# Patient Record
Sex: FEMALE | Race: WHITE | NOT HISPANIC OR LATINO | URBAN - METROPOLITAN AREA
[De-identification: names, ages, dates, MRNs, and addresses within clinical notes are randomized per-mention and may not be internally consistent; named-entity substitution may affect disease eponyms.]

---

## 2017-09-11 ENCOUNTER — OUTPATIENT (OUTPATIENT)
Dept: OUTPATIENT SERVICES | Facility: HOSPITAL | Age: 57
LOS: 1 days | End: 2017-09-11
Payer: COMMERCIAL

## 2017-09-11 ENCOUNTER — APPOINTMENT (OUTPATIENT)
Dept: CT IMAGING | Facility: IMAGING CENTER | Age: 57
End: 2017-09-11
Payer: COMMERCIAL

## 2017-09-11 DIAGNOSIS — Z00.8 ENCOUNTER FOR OTHER GENERAL EXAMINATION: ICD-10-CM

## 2017-09-11 DIAGNOSIS — Z98.89 OTHER SPECIFIED POSTPROCEDURAL STATES: Chronic | ICD-10-CM

## 2017-09-11 PROCEDURE — 74177 CT ABD & PELVIS W/CONTRAST: CPT

## 2017-09-11 PROCEDURE — 82565 ASSAY OF CREATININE: CPT

## 2017-09-11 PROCEDURE — 74177 CT ABD & PELVIS W/CONTRAST: CPT | Mod: 26

## 2017-10-18 ENCOUNTER — INPATIENT (INPATIENT)
Facility: HOSPITAL | Age: 57
LOS: 6 days | Discharge: HOME | DRG: 189 | End: 2017-10-25
Attending: HOSPITALIST | Admitting: HOSPITALIST
Payer: COMMERCIAL

## 2017-10-18 VITALS
RESPIRATION RATE: 24 BRPM | HEART RATE: 90 BPM | OXYGEN SATURATION: 90 % | DIASTOLIC BLOOD PRESSURE: 118 MMHG | SYSTOLIC BLOOD PRESSURE: 187 MMHG

## 2017-10-18 DIAGNOSIS — Z29.9 ENCOUNTER FOR PROPHYLACTIC MEASURES, UNSPECIFIED: ICD-10-CM

## 2017-10-18 DIAGNOSIS — I26.99 OTHER PULMONARY EMBOLISM WITHOUT ACUTE COR PULMONALE: ICD-10-CM

## 2017-10-18 DIAGNOSIS — I10 ESSENTIAL (PRIMARY) HYPERTENSION: ICD-10-CM

## 2017-10-18 DIAGNOSIS — Z98.89 OTHER SPECIFIED POSTPROCEDURAL STATES: Chronic | ICD-10-CM

## 2017-10-18 DIAGNOSIS — J96.21 ACUTE AND CHRONIC RESPIRATORY FAILURE WITH HYPOXIA: ICD-10-CM

## 2017-10-18 DIAGNOSIS — J45.909 UNSPECIFIED ASTHMA, UNCOMPLICATED: ICD-10-CM

## 2017-10-18 LAB
ANION GAP SERPL CALC-SCNC: 10 MMOL/L — SIGNIFICANT CHANGE UP (ref 5–17)
APTT BLD: 29.2 SEC — SIGNIFICANT CHANGE UP (ref 27.5–37.4)
BASOPHILS # BLD AUTO: 0 K/UL — SIGNIFICANT CHANGE UP (ref 0–0.2)
BASOPHILS NFR BLD AUTO: 0.3 % — SIGNIFICANT CHANGE UP (ref 0–2)
BUN SERPL-MCNC: 13 MG/DL — SIGNIFICANT CHANGE UP (ref 7–23)
CALCIUM SERPL-MCNC: 8.7 MG/DL — SIGNIFICANT CHANGE UP (ref 8.4–10.5)
CHLORIDE SERPL-SCNC: 98 MMOL/L — SIGNIFICANT CHANGE UP (ref 96–108)
CO2 SERPL-SCNC: 38 MMOL/L — HIGH (ref 22–31)
CREAT SERPL-MCNC: 0.84 MG/DL — SIGNIFICANT CHANGE UP (ref 0.5–1.3)
EOSINOPHIL # BLD AUTO: 0.1 K/UL — SIGNIFICANT CHANGE UP (ref 0–0.5)
EOSINOPHIL NFR BLD AUTO: 0.9 % — SIGNIFICANT CHANGE UP (ref 0–6)
GAS PNL BLDA: SIGNIFICANT CHANGE UP
GAS PNL BLDV: SIGNIFICANT CHANGE UP
GAS PNL BLDV: SIGNIFICANT CHANGE UP
GLUCOSE SERPL-MCNC: 140 MG/DL — HIGH (ref 70–99)
HCT VFR BLD CALC: 49.9 % — HIGH (ref 34.5–45)
HGB BLD-MCNC: 15.3 G/DL — SIGNIFICANT CHANGE UP (ref 11.5–15.5)
INR BLD: 1.13 RATIO — SIGNIFICANT CHANGE UP (ref 0.88–1.16)
LYMPHOCYTES # BLD AUTO: 1.8 K/UL — SIGNIFICANT CHANGE UP (ref 1–3.3)
LYMPHOCYTES # BLD AUTO: 13.9 % — SIGNIFICANT CHANGE UP (ref 13–44)
MCHC RBC-ENTMCNC: 24.7 PG — LOW (ref 27–34)
MCHC RBC-ENTMCNC: 30.6 GM/DL — LOW (ref 32–36)
MCV RBC AUTO: 80.5 FL — SIGNIFICANT CHANGE UP (ref 80–100)
MONOCYTES # BLD AUTO: 0.6 K/UL — SIGNIFICANT CHANGE UP (ref 0–0.9)
MONOCYTES NFR BLD AUTO: 4.8 % — SIGNIFICANT CHANGE UP (ref 2–14)
NEUTROPHILS # BLD AUTO: 10.3 K/UL — HIGH (ref 1.8–7.4)
NEUTROPHILS NFR BLD AUTO: 80 % — HIGH (ref 43–77)
PLATELET # BLD AUTO: 253 K/UL — SIGNIFICANT CHANGE UP (ref 150–400)
POTASSIUM SERPL-MCNC: 3.8 MMOL/L — SIGNIFICANT CHANGE UP (ref 3.5–5.3)
POTASSIUM SERPL-SCNC: 3.8 MMOL/L — SIGNIFICANT CHANGE UP (ref 3.5–5.3)
PROTHROM AB SERPL-ACNC: 12.4 SEC — SIGNIFICANT CHANGE UP (ref 9.8–12.7)
RBC # BLD: 6.2 M/UL — HIGH (ref 3.8–5.2)
RBC # FLD: 19.3 % — HIGH (ref 10.3–14.5)
SODIUM SERPL-SCNC: 146 MMOL/L — HIGH (ref 135–145)
TROPONIN T SERPL-MCNC: <0.01 NG/ML — SIGNIFICANT CHANGE UP (ref 0–0.06)
WBC # BLD: 12.9 K/UL — HIGH (ref 3.8–10.5)
WBC # FLD AUTO: 12.9 K/UL — HIGH (ref 3.8–10.5)

## 2017-10-18 PROCEDURE — 99223 1ST HOSP IP/OBS HIGH 75: CPT | Mod: GC

## 2017-10-18 PROCEDURE — 71010: CPT | Mod: 26

## 2017-10-18 PROCEDURE — 71275 CT ANGIOGRAPHY CHEST: CPT | Mod: 26

## 2017-10-18 PROCEDURE — 99497 ADVNCD CARE PLAN 30 MIN: CPT | Mod: 25

## 2017-10-18 PROCEDURE — 99291 CRITICAL CARE FIRST HOUR: CPT

## 2017-10-18 RX ORDER — IPRATROPIUM/ALBUTEROL SULFATE 18-103MCG
3 AEROSOL WITH ADAPTER (GRAM) INHALATION EVERY 6 HOURS
Qty: 0 | Refills: 0 | Status: DISCONTINUED | OUTPATIENT
Start: 2017-10-18 | End: 2017-10-25

## 2017-10-18 RX ORDER — IPRATROPIUM/ALBUTEROL SULFATE 18-103MCG
3 AEROSOL WITH ADAPTER (GRAM) INHALATION ONCE
Qty: 0 | Refills: 0 | Status: COMPLETED | OUTPATIENT
Start: 2017-10-18 | End: 2017-10-18

## 2017-10-18 RX ORDER — HEPARIN SODIUM 5000 [USP'U]/ML
5000 INJECTION INTRAVENOUS; SUBCUTANEOUS EVERY 6 HOURS
Qty: 0 | Refills: 0 | Status: DISCONTINUED | OUTPATIENT
Start: 2017-10-18 | End: 2017-10-19

## 2017-10-18 RX ORDER — IPRATROPIUM/ALBUTEROL SULFATE 18-103MCG
3 AEROSOL WITH ADAPTER (GRAM) INHALATION ONCE
Qty: 0 | Refills: 0 | Status: COMPLETED | OUTPATIENT
Start: 2017-10-18 | End: 2017-10-22

## 2017-10-18 RX ORDER — HEPARIN SODIUM 5000 [USP'U]/ML
10000 INJECTION INTRAVENOUS; SUBCUTANEOUS EVERY 6 HOURS
Qty: 0 | Refills: 0 | Status: DISCONTINUED | OUTPATIENT
Start: 2017-10-18 | End: 2017-10-19

## 2017-10-18 RX ORDER — FUROSEMIDE 40 MG
40 TABLET ORAL
Qty: 0 | Refills: 0 | Status: DISCONTINUED | OUTPATIENT
Start: 2017-10-19 | End: 2017-10-23

## 2017-10-18 RX ORDER — HEPARIN SODIUM 5000 [USP'U]/ML
10000 INJECTION INTRAVENOUS; SUBCUTANEOUS ONCE
Qty: 0 | Refills: 0 | Status: COMPLETED | OUTPATIENT
Start: 2017-10-18 | End: 2017-10-18

## 2017-10-18 RX ORDER — HEPARIN SODIUM 5000 [USP'U]/ML
INJECTION INTRAVENOUS; SUBCUTANEOUS
Qty: 25000 | Refills: 0 | Status: DISCONTINUED | OUTPATIENT
Start: 2017-10-18 | End: 2017-10-19

## 2017-10-18 RX ORDER — FUROSEMIDE 40 MG
120 TABLET ORAL ONCE
Qty: 0 | Refills: 0 | Status: COMPLETED | OUTPATIENT
Start: 2017-10-18 | End: 2017-10-18

## 2017-10-18 RX ADMIN — Medication 3 MILLILITER(S): at 17:40

## 2017-10-18 RX ADMIN — Medication 124 MILLIGRAM(S): at 19:16

## 2017-10-18 RX ADMIN — HEPARIN SODIUM 2200 UNIT(S)/HR: 5000 INJECTION INTRAVENOUS; SUBCUTANEOUS at 20:21

## 2017-10-18 RX ADMIN — HEPARIN SODIUM 10000 UNIT(S): 5000 INJECTION INTRAVENOUS; SUBCUTANEOUS at 19:47

## 2017-10-18 RX ADMIN — Medication 3 MILLILITER(S): at 17:39

## 2017-10-18 RX ADMIN — Medication 3 MILLILITER(S): at 18:23

## 2017-10-18 RX ADMIN — Medication 3 MILLILITER(S): at 17:17

## 2017-10-18 RX ADMIN — Medication 3 MILLILITER(S): at 17:16

## 2017-10-18 RX ADMIN — Medication 3 MILLILITER(S): at 23:07

## 2017-10-18 NOTE — H&P ADULT - PROBLEM SELECTOR PLAN 4
-Does not appear to be in exacerbation clinically, however consider empiric steroids if pt continues to decompensate. DuoNebs q6h, f/u MICU recs. -Possibility there is a component of asthma exacerbation contributing to her symptoms as her dyspnea is likely multifactorial, start empiric prednisone 40 x 5 days.  Start Levaquin x 5 days and follow above infectious workup; if pt appears better can likely d/c abx if clinically stabilizs.  DuoNebs atc q6h, f/u MICU recs.

## 2017-10-18 NOTE — H&P ADULT - PROBLEM SELECTOR PLAN 2
-c/w hep gtt  -BP stable although pt w/ increased O2 requirements. MICU consulted. -c/w hep gtt as above  -BP stable although pt w/ increased O2 requirements. 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 doac.  -send infectious workup though at this time infection does not seem to be primary driving force behind multifactorial hypoxic/hypercarbic resp failure: check rvp, blood cultures, sputum cultures, legionella ag.

## 2017-10-18 NOTE — ED PROVIDER NOTE - CRITICAL CARE PROVIDED
direct patient care (not related to procedure)/documentation/additional history taking/interpretation of diagnostic studies

## 2017-10-18 NOTE — CONSULT NOTE ADULT - ATTENDING COMMENTS
This  is a 57 Y.O. female with pmh of AARON on CPAP  at home with O2 supplementation at night, DVT 5 years ago s/p a/c, asthma no outpatient PFT's, T2dm, who presents with sob, cough and found to be hypoxic at PMD's office.  In the ED was found to have acute on chronic hypoxemic/ hypercarbic RF  with CTA chest  showed small subseg RML PE and pulmonary edema without pna. US exam at bedside showed scattered B lined R>L.  Patient now with -  1. Acute on chronic hypoxemic/ hypercarbic respiratory failure - multimodal- acute asthma exacerbation, pulmonary edema coupled with subsegmental PE on based line AARON and chronic O2 use- steroids, duonebs, optimize fluid balance , TTE, Pulmonary team am, aggressive pulmonary toilet.   2. PE- subsegmental - but given patient habitus and minimal mobility need LE duplex and possible LT a/c weighing appropriate risk, benefits   3. morbid Obesity- Nutrition and dietary c/s   Care and treatment as detailed above unless otherwise stated. No need for MICU at this juncture. Please feel free to re-consult if any change in patient's clinical status. We will continue to follow. Case discussed extensively with patient, staff, team and specialist on board.

## 2017-10-18 NOTE — CONSULT NOTE ADULT - SUBJECTIVE AND OBJECTIVE BOX
CHIEF COMPLAINT: SOB, hypoxemia    HPI:   The patient is a 57 Y.O. female with pmh of AARON on CPAP at home, no know settings, DVT 5 years ago s/p a/c, asthma no outpatient PFT's, T2dm, who presents with sob, cough and found to be hypoxic at PMD's office.     The patient has been having increased sob, and cough progressively over the last week. No fevers, no chills, travel or sick contacts. Noticed that she is having harder time breathing and with ambulation. Clear sputum, no chronic cough, sob, or wheezing. Found her self to be very tired and harder time lying down flat. The patient went to PMD was found to be hypoxic to 60"s and reported to the ED. She is non-complaint with meds and only occasional use of cpap. Had stress 6 years ago, doesn't remember an echo. LE is chronic on and off on lasix. Not worse than in the past. No CARDs or outpatient PFT's.     In the ED was found to have hypercarbic RF, never intubated in the past for asthma, never on steroids. Started on lasix,nebs and bipap. CTA showed small subseg RML PE and pulmonary edema without pna. US exam at bedside showed scattered B lined R>L. By the time of exam hypoxia largely improved but still with gradient, improving CO2 and patient feeling much better.    PAST MEDICAL & SURGICAL HISTORY:  Pneumomediastinum  Umbilical hernia: Reduciable  Osteoarthritis of both knees, unspecified osteoarthritis type  AARON (Obstructive Sleep Apnea): category II pt uses c/pap pt to bering to hospital/  OR booking notified  Pneumonia:   GERD (Gastroesophageal Reflux Disease)  Asthma: diagnosed   on adbvair denies attacks  DVT (Deep Venous Thrombosis): left leg 6 m s/p knee replacement in   HTN (Hypertension)  H/O ileostomy: Ileostomy Revesal   S/P LEEP:   S/P Exploratory Laparotomy:   peritonitis  s/p right knee replacement/ colon resection and ileostomy  S/P Colonoscopy:   S/P Endoscopy:  gerd  S/P  Section:   History of Tubal Ligation: s/p c/section   S/P Knee Surgery: 2010      FAMILY HISTORY:  Family history of gastric cancer  Family history of breast cancer (Grandparent)      SOCIAL HISTORY:  Smoking: [x ] Never Smoked [ ] Former Smoker (__ packs x ___ years) [ ] Current Smoker  (__ packs x ___ years)  Substance Use: [ x] Never Used [ ] Used ____  EtOH Use: Not a drinker.   Marital Status: [ ] Single [ ]  [ ]  [ ]   Sexual History:   Occupation: Retired/disability. WOrked as .   Recent Travel:  Country of Birth:  Advance Directives:    Allergies    Kiwi (Unknown)  latex (Anaphylaxis)  latex (Unknown)  penicillin (Other)  penicillin (Unknown)  perfume  hives (Other)  potassium acetate (Other)  soap additives hives (Other)    Intolerances        HOME MEDICATIONS:    REVIEW OF SYSTEMS:  Constitutional: [x ] negative [ ] fevers [ ] chills [ ] weight loss [ ] weight gain  HEENT: [ x] negative [ ] dry eyes [ ] eye irritation [ ] postnasal drip [ ] nasal congestion  CV: [ ] negative  [ ] chest pain [x ] orthopnea [ ] palpitations [ ] murmur  Resp: [ ] negative [x ] cough [x ] shortness of breath [x ] dyspnea [ ] wheezing [ ] sputum [ ] hemoptysis  GI: [ x] negative [ ] nausea [ ] vomiting [ ] diarrhea [ ] constipation [ ] abd pain [ ] dysphagia   : [x ] negative [ ] dysuria [ ] nocturia [ ] hematuria [ ] increased urinary frequency  Musculoskeletal: [ x] negative [ ] back pain [ ] myalgias [ ] arthralgias [ ] fracture  Skin: [ x] negative [ ] rash [ ] itch  Neurological: [ x] negative [ ] headache [ ] dizziness [ ] syncope [ ] weakness [ ] numbness  Psychiatric: [x ] negative [ ] anxiety [ ] depression  Endocrine: [ ] negative [ ] diabetes [ ] thyroid problem  Hematologic/Lymphatic: [ ] negative [ ] anemia [ ] bleeding problem  Allergic/Immunologic: [ ] negative [ ] itchy eyes [ ] nasal discharge [ ] hives [ ] angioedema  [ ] All other systems negative  [ ] Unable to assess ROS because ________    OBJECTIVE:  ICU Vital Signs Last 24 Hrs  T(C): 36.9 (18 Oct 2017 16:50), Max: 36.9 (18 Oct 2017 16:50)  T(F): 98.5 (18 Oct 2017 16:50), Max: 98.5 (18 Oct 2017 16:50)  HR: 110 (18 Oct 2017 22:56) (90 - 112)  BP: 127/67 (18 Oct 2017 22:51) (110/91 - 187/118)  BP(mean): --  ABP: --  ABP(mean): --  RR: 22 (18 Oct 2017 22:56) (18 - 27)  SpO2: 100% (18 Oct 2017 22:56) (90% - 100%)        CAPILLARY BLOOD GLUCOSE    PHYSICAL EXAM:    Constitutional: well-developed; well-groomed; well-nourished; no distress, Obese, thick neck.   Eyes: PERRL; EOMI  ENMT: Bipap in place.   Neck:  Supple; no JVD; normal thyroid gland  MSK/Back: normal shape; ROM intact; strength intact, no CVA tenderness. Normal strength in all ext, No joint swelling, no joint tenderenss, no joint erythema, no effusions  Respiratory: No wheezing, crackled, limited exam 2/2 to habitus.   Cardiovascular: regular rate and rhythm  no rub , no murmur, no gallops.   Gastrointestinal: soft; no distention, normal BS, no TTP, no rebound, no guarding, no mass, no organomegaly, no ascites. reducible hernia.   Genitourinary:  Extremities: no clubbing; no cyanosis; +1 pedal edema to mid shins, non-tender to palpation, DP and Radial pulses intact.  Neurological: alert and oriented x 3; responds to pain; responds to verbal commands; sensation intact: CN nerves grossly intact.   Skin: warm and dry; color normal: no rash: no ulcers  Psychiatric: Calm, no SI/HI        LINES:     HOSPITAL MEDICATIONS:  heparin  Infusion.  Unit(s)/Hr IV Continuous <Continuous>          ALBUTerol/ipratropium for Nebulization 3 milliLiter(s) Nebulizer every 6 hours  ALBUTerol/ipratropium for Nebulization. 3 milliLiter(s) Nebulizer once  ALBUTerol/ipratropium for Nebulization. 3 milliLiter(s) Nebulizer once        LABS:                        15.3   12.9  )-----------( 253      ( 18 Oct 2017 16:52 )             49.9     Hgb Trend: 15.3<--  10-18    146<H>  |  98  |  13  ----------------------------<  140<H>  3.8   |  38<H>  |  0.84    Ca    8.7      18 Oct 2017 16:52      Creatinine Trend: 0.84<--  PT/INR - ( 18 Oct 2017 16:52 )   PT: 12.4 sec;   INR: 1.13 ratio         PTT - ( 18 Oct 2017 16:52 )  PTT:29.2 sec    Arterial Blood Gas:  10-18 @ 22:22  7.34/85/77/44/94/14.0  ABG lactate: --    Venous Blood Gas:  10-18 @ 18:52  7.25/107/42/45/61  VBG Lactate: 1.2  Venous Blood Gas:  10-18 @ 16:52  7.28/93/46/42/70  VBG Lactate: 1.5      MICROBIOLOGY:     RADIOLOGY:  [x ] Reviewed and interpreted by me    CTA	Subsegmental pulmonary embolism in the right middle lobe, likely with   	superimposed edema.  CXR  Pulmonary edema.     EKG: 	EKG: sinus rhythm, no ST/T wave changes CHIEF COMPLAINT: SOB, hypoxemia    HPI:   The patient is a 57 Y.O. female with pmh of AARON on CPAP  at home, no know settings, DVT 5 years ago s/p a/c, asthma no outpatient PFT's, T2dm, who presents with sob, cough and found to be hypoxic at PMD's office.     The patient has been having increased sob, and cough progressively over the last week. No fevers, no chills, travel or sick contacts. Noticed that she is having harder time breathing and with ambulation. Clear sputum, no chronic cough, sob, or wheezing. Found her self to be very tired and harder time lying down flat. The patient went to PMD was found to be hypoxic to 60"s and reported to the ED. She is non-complaint with meds and only occasional use of cpap. Had stress 6 years ago, doesn't remember an echo. LE is chronic on and off on lasix. Not worse than in the past. No CARDs or outpatient PFT's.     In the ED was found to have hypercarbic RF, never intubated in the past for asthma, never on steroids. Started on lasix, ,nebs and bipap. CTA showed small subseg RML PE and pulmonary edema without pna. US exam at bedside showed scattered B lined R>L. By the time of exam hypoxia largely improved but still with gradient, improving CO2 and patient feeling much better.    PAST MEDICAL & SURGICAL HISTORY:  Pneumomediastinum  Umbilical hernia: Reducible  Osteoarthritis of both knees, unspecified osteoarthritis type  AARON (Obstructive Sleep Apnea): category II pt uses c/pap pt to bring to hospital/  OR booking notified  Pneumonia:   GERD (Gastroesophageal Reflux Disease)  Asthma: diagnosed   on Advair denies attacks  DVT (Deep Venous Thrombosis): left leg 6 m s/p knee replacement in   HTN (Hypertension)  H/O ileostomy: Ileostomy Reversal   S/P LEEP:   S/P Exploratory Laparotomy:   peritonitis  s/p right knee replacement/ colon resection and ileostomy  S/P Colonoscopy:   S/P Endoscopy:  GERD  S/P  Section:   History of Tubal Ligation: s/p c/section   S/P Knee Surgery: 2010      FAMILY HISTORY:  Family history of gastric cancer  Family history of breast cancer (Grandparent)      SOCIAL HISTORY:  Smoking: [x ] Never Smoked [ ] Former Smoker (__ packs x ___ years) [ ] Current Smoker  (__ packs x ___ years)  Substance Use: [ x] Never Used [ ] Used ____  EtOH Use: Not a drinker.   Marital Status: [ ] Single [ ]  [ ]  [ ]   Sexual History:   Occupation: Retired/disability. WOrked as .   Recent Travel:  Country of Birth:  Advance Directives:    Allergies    Kiwi (Unknown)  latex (Anaphylaxis)  latex (Unknown)  penicillin (Other)  penicillin (Unknown)  perfume  hives (Other)  potassium acetate (Other)  soap additives hives (Other)    Intolerances        HOME MEDICATIONS:    REVIEW OF SYSTEMS:  Constitutional: [x ] negative [ ] fevers [ ] chills [ ] weight loss [ ] weight gain  HEENT: [ x] negative [ ] dry eyes [ ] eye irritation [ ] postnasal drip [ ] nasal congestion  CV: [ ] negative  [ ] chest pain [x ] orthopnea [ ] palpitations [ ] murmur  Resp: [ ] negative [x ] cough [x ] shortness of breath [x ] dyspnea [ ] wheezing [ ] sputum [ ] hemoptysis  GI: [ x] negative [ ] nausea [ ] vomiting [ ] diarrhea [ ] constipation [ ] abd pain [ ] dysphagia   : [x ] negative [ ] dysuria [ ] nocturia [ ] hematuria [ ] increased urinary frequency  Musculoskeletal: [ x] negative [ ] back pain [ ] myalgias [ ] arthralgias [ ] fracture  Skin: [ x] negative [ ] rash [ ] itch  Neurological: [ x] negative [ ] headache [ ] dizziness [ ] syncope [ ] weakness [ ] numbness  Psychiatric: [x ] negative [ ] anxiety [ ] depression  Endocrine: [ ] negative [ ] diabetes [ ] thyroid problem  Hematologic/Lymphatic: [ ] negative [ ] anemia [ ] bleeding problem  Allergic/Immunologic: [ ] negative [ ] itchy eyes [ ] nasal discharge [ ] hives [ ] angioedema  [ ] All other systems negative  [ ] Unable to assess ROS because ________    OBJECTIVE:  ICU Vital Signs Last 24 Hrs  T(C): 36.9 (18 Oct 2017 16:50), Max: 36.9 (18 Oct 2017 16:50)  T(F): 98.5 (18 Oct 2017 16:50), Max: 98.5 (18 Oct 2017 16:50)  HR: 110 (18 Oct 2017 22:56) (90 - 112)  BP: 127/67 (18 Oct 2017 22:51) (110/91 - 187/118)  BP(mean): --  ABP: --  ABP(mean): --  RR: 22 (18 Oct 2017 22:56) (18 - 27)  SpO2: 100% (18 Oct 2017 22:56) (90% - 100%)        CAPILLARY BLOOD GLUCOSE    PHYSICAL EXAM:    Constitutional: well-developed; well-groomed; well-nourished; no distress, Obese, thick neck.   Eyes: PERRL; EOMI  ENMT: Bipap in place.   Neck:  Supple; no JVD; normal thyroid gland  MSK/Back: normal shape; ROM intact; strength intact, no CVA tenderness. Normal strength in all ext, No joint swelling, no joint tenderenss, no joint erythema, no effusions  Respiratory: No wheezing, crackled, limited exam 2/2 to habitus.   Cardiovascular: regular rate and rhythm  no rub , no murmur, no gallops.   Gastrointestinal: soft; no distention, normal BS, no TTP, no rebound, no guarding, no mass, no organomegaly, no ascites. reducible hernia.   Genitourinary:  Extremities: no clubbing; no cyanosis; +1 pedal edema to mid shins, non-tender to palpation, DP and Radial pulses intact.  Neurological: alert and oriented x 3; responds to pain; responds to verbal commands; sensation intact: CN nerves grossly intact.   Skin: warm and dry; color normal: no rash: no ulcers  Psychiatric: Calm, no SI/HI        LINES:     HOSPITAL MEDICATIONS:  heparin  Infusion.  Unit(s)/Hr IV Continuous <Continuous>          ALBUTerol/ipratropium for Nebulization 3 milliLiter(s) Nebulizer every 6 hours  ALBUTerol/ipratropium for Nebulization. 3 milliLiter(s) Nebulizer once  ALBUTerol/ipratropium for Nebulization. 3 milliLiter(s) Nebulizer once        LABS:                        15.3   12.9  )-----------( 253      ( 18 Oct 2017 16:52 )             49.9     Hgb Trend: 15.3<--  10-18    146<H>  |  98  |  13  ----------------------------<  140<H>  3.8   |  38<H>  |  0.84    Ca    8.7      18 Oct 2017 16:52      Creatinine Trend: 0.84<--  PT/INR - ( 18 Oct 2017 16:52 )   PT: 12.4 sec;   INR: 1.13 ratio         PTT - ( 18 Oct 2017 16:52 )  PTT:29.2 sec    Arterial Blood Gas:  10-18 @ 22:22  7.34/85/77/44/94/14.0  ABG lactate: --    Venous Blood Gas:  10-18 @ 18:52  7.25/107/42/45/61  VBG Lactate: 1.2  Venous Blood Gas:  10-18 @ 16:52  7.28/93/46/42/70  VBG Lactate: 1.5      MICROBIOLOGY:     RADIOLOGY:  [x ] Reviewed and interpreted by me    CTA	Subsegmental pulmonary embolism in the right middle lobe, likely with   	superimposed edema.  CXR  Pulmonary edema.     EKG: 	EKG: sinus rhythm, no ST/T wave changes

## 2017-10-18 NOTE — ED PROVIDER NOTE - PSH
H/O ileostomy  Ileostomy Revesal   History of Tubal Ligation  s/p c/section   S/P  Section    S/P Colonoscopy    S/P Endoscopy   gerd  S/P Exploratory Laparotomy    peritonitis  s/p right knee replacement/ colon resection and ileostomy  S/P Knee Surgery  2010  S/P LEEP

## 2017-10-18 NOTE — H&P ADULT - ASSESSMENT
57 F w/ asthma and HTN a/w hypercapnic and hypoxic resp failure found to have new PE and pulmonary edema. 57 F w/ asthma and HTN a/w hypercapnic and hypoxic resp failure found to have new PE and pulmonary edema. Suspect hypoxia 2/2 to PE and pulmonary edema from ?new onset heart failure and hypercarbia from non-compliance with CPAP at home vs. asthma exacerbation. 57 F w/ asthma and HTN a/w hypercapnic and hypoxic resp failure found to have new PE and pulmonary edema. Suspect hypoxia 2/2 to PE and pulmonary edema from ?undiagnosed heart failure and hypercarbia from non-compliance with CPAP at home vs. asthma exacerbation.

## 2017-10-18 NOTE — ED PROVIDER NOTE - OBJECTIVE STATEMENT
57 year old female presenting with hypoxia and sob. 60s % on ra at md office, travel by EMS to here high 80s. Pt has hx of dvt, recently started on lasix for le edema, pt also recently traveled. NO cp or syncope. No pleurisy. Mild cough. hx asthma as well. does not feel like asthma attack.

## 2017-10-18 NOTE — H&P ADULT - NSHPSOCIALHISTORY_GEN_ALL_CORE
No smoking, alcohol, or IVDU. Lives at home with  and children No smoking, alcohol, or IVDU. Lives at home with  and children.  retired  owner.

## 2017-10-18 NOTE — ED ADULT NURSE NOTE - OBJECTIVE STATEMENT
56 yo female presents to the ED from home c/o SOB x2days. patient states she has been having cough x3 weeks, dry cough until last night she "had a coughing attack" with thick white sputum and SOB. she states the SOB has increased today with the productive cough which brought her to the ED. patient is AAOx3. lung sounds clear but diminished bilaterally. cap refill <3sec. +3 pitting edema to legs bilaterally with dorsal pulses present bilaterally. skin is warm, dry and intact. normal skin tone. patient denies fevers, chills, N/V/D, HA, dizziness, chest pain. productive cough present, thick white sputum. patient 90%= O2 RA. patient placed on nonrebreather with O2= 93%. HR= 102-106. MD at the bedside.

## 2017-10-18 NOTE — ED ADULT NURSE REASSESSMENT NOTE - NS ED NURSE REASSESS COMMENT FT1
1730- patient O2= 88% on non-breather. Respiratory called. patient placed on BiPAP with settings of IPAP= 14, EPAP=7, O2= 75%, RR= 12, VTV= 360. patient tolerated well. O2= 95%.

## 2017-10-18 NOTE — ED PROVIDER NOTE - CONSTITUTIONAL, MLM
normal... Well appearing, well nourished, awake, alert, oriented to person, place, time/situation and in mild resp distress.

## 2017-10-18 NOTE — ED PROVIDER NOTE - NS ED ROS FT
ROS: As noted in HPI, otherwise below --  Constitutional symptoms: Negative  Eyes: Negative  Ears, Nose, Mouth, Throat: Negative  Cardiovascular: Negative  Respiratory: +  Gastrointestinal: Negative  Genitourinary: Negative  Musculoskeletal: Negative  Integumentary: Negative  Neurological: Negative  Psychiatric: Negative  Endocrine: Negative  Allergic/Immunologic: Negative

## 2017-10-18 NOTE — H&P ADULT - NSHPPHYSICALEXAM_GEN_ALL_CORE
GENERAL APPEARANCE: Well developed, well nourished, alert and cooperative on BiPAP. NAD.   HEENT:  NC/AT, clear conjunctiva  NECK: Neck supple, non-tender without lymphadenopathy, masses  CARDIAC: Normal S1 and S2. Tachy  LUNGS: Decreased breath sounds bilaterally, no wheezing appreciated  ABDOMEN: Soft, nondistended, nontender. No guarding or rebound. +reducible ventral hernia   MUSKULOSKELETAL: No joint erythema or tenderness.   EXTREMITIES: 2-3+ b/l pitting LE edema, L worse than R  NEUROLOGICAL: No focal neurologic deficit. Strength and sensation symmetric and intact throughout.   SKIN: Skin clean, dry, intact  PSYCHIATRIC: AOx3.Normal affect and behavior. GENERAL APPEARANCE: Well developed, well nourished, alert and cooperative on BiPAP. NAD.   HEAD: NC/AT  EYES: clear conjunctiva, midline  NECK: Neck supple, non-tender without lymphadenopathy, masses  HEART: Normal S1 and S2. Tachy  LUNGS: Decreased breath sounds bilaterally, no wheezing appreciated but poor aeration overall  ABDOMEN: Soft, nondistended, nontender. No guarding or rebound. +reducible ventral hernia   MUSKULOSKELETAL: No joint erythema or tenderness.   EXTREMITIES: 2-3+ b/l pitting LE edema, L worse than R  NEUROLOGICAL: No focal neurologic deficit. Strength and sensation symmetric and intact throughout.   SKIN: Skin clean, dry, intact  PSYCHIATRIC: AOx3.Normal affect and behavior.

## 2017-10-18 NOTE — CONSULT NOTE ADULT - NSHPATTENDINGPLANDISCUSS_GEN_ALL_CORE
We will continue to follow. Case discussed extensively with patient, staff, team and specialist on board.

## 2017-10-18 NOTE — ED ADULT NURSE REASSESSMENT NOTE - NS ED NURSE REASSESS COMMENT FT1
As per conversation with MD Lam, requested MICU consult for pt due to tachycardia and low O2 saturation in the mid 80s on BiPAP and tachypnea.  Will continue to monitor.

## 2017-10-18 NOTE — ED ADULT NURSE REASSESSMENT NOTE - NS ED NURSE REASSESS COMMENT FT1
Comfort care measures provided.  Safety maintained, patient still tolerating BIPAP.  Patient is pending bed assignment upstairs.  Family at the bedside, patient and family aware of plan of care.

## 2017-10-18 NOTE — ED PROVIDER NOTE - MEDICAL DECISION MAKING DETAILS
ddx pe, pna, ptx, chf, asthma . unlikely acs given symptoms. xr chest, cta, labs, ekg, will not give emperic ac yet but if pt decompensates before imaging will do so.

## 2017-10-18 NOTE — ED PROVIDER NOTE - PMH
Anemia  borderline anemia  Asthma  diagnosed 2010  on adbvair denies attacks  Cervical Dysplasia  s/p leep 2002  DVT (Deep Venous Thrombosis)  left leg 6 m s/p knee replacement in 2010  GERD (Gastroesophageal Reflux Disease)    HTN (Hypertension)    AARON (Obstructive Sleep Apnea)  category II pt uses c/pap pt to bering to hospital/  OR booking notified  Osteoarthritis of both knees, unspecified osteoarthritis type    Pneumonia  9-2010  Umbilical hernia  Reduciable

## 2017-10-18 NOTE — H&P ADULT - NSHPREVIEWOFSYSTEMS_GEN_ALL_CORE
CONSTITUTIONAL:  No weight loss, fever, chills. +fatigue  HEENT:  Eyes:  No visual loss, blurred vision, double vision or yellow sclerae. Ears, Nose, Throat:  No hearing loss, sneezing, congestion, runny nose or sore throat.  SKIN:  No rash or itching.  CARDIOVASCULAR:  No chest pain, chest pressure or chest discomfort. No palpitations or edema.  RESPIRATORY:  +SOB, productive cough  GASTROINTESTINAL:  No nausea, vomiting or diarrhea. No abdominal pain.   GENITOURINARY:  Denies hematuria, dysuria.   NEUROLOGICAL:  No headache, dizziness, syncope.   MUSCULOSKELETAL:  No muscle, back pain, joint pain or stiffness.  HEMATOLOGIC:  No anemia, bleeding or bruising. CONSTITUTIONAL:  No weight loss, fever, chills. +fatigue  HEENT:  Eyes:  No visual loss, blurred vision, double vision or yellow sclerae. Ears, Nose, Throat:  No hearing loss, sneezing, congestion, runny nose or sore throat.  SKIN:  No rash or itching.  CARDIOVASCULAR:  No chest pain, chest pressure or chest discomfort. No palpitations or edema.  RESPIRATORY:  +SOB, productive cough  GASTROINTESTINAL:  No nausea, vomiting or diarrhea. No abdominal pain.   GENITOURINARY:  Denies hematuria, dysuria.   NEUROLOGICAL:  No headache, dizziness, syncope.   MUSCULOSKELETAL:  No muscle, back pain, joint pain or stiffness.  HEMATOLOGIC:  No anemia, bleeding or bruising.  ALLERGIC: no hives, no itching

## 2017-10-18 NOTE — ED PROVIDER NOTE - FAMILY HISTORY
Family history of gastric cancer     Grandparent  Still living? Unknown  Family history of breast cancer, Age at diagnosis: Age Unknown

## 2017-10-18 NOTE — H&P ADULT - HISTORY OF PRESENT ILLNESS
57 F w/ asthma and HTN p/w 2 weeks of worsening SOB and cough. Pt presented to PCP today and was found to be hypoxic to the 70s and transferred to ED for further management. Pt reports that she recently went on a trip and just came back last week. She has a hx of DVT on her L leg that she was not on anticoagulation. Her symptoms have been ongoing for the past 2 weeks and got so bad today that prompted her to seek medical attention. She endorses cough productive of clear sputum, denies any blood. Also endorsed generalized fatigue and weakness. Denies chest pain, fever, sweats, chills, or rhinorrhea. No nausea, vomiting, or abd pain. No sick contacts. While in the ED, she was found to have a new RML subsegmental PE and pulmonary edema. Pt given 120mg IV lasix, hep gtt, and placed on BiPAP. Admitted to medicine for further management.     Vital Signs Last 24 Hrs  T(C): 36.9 (18 Oct 2017 16:50), Max: 36.9 (18 Oct 2017 16:50)  T(F): 98.5 (18 Oct 2017 16:50), Max: 98.5 (18 Oct 2017 16:50)  HR: 110 (18 Oct 2017 22:56) (90 - 112)  BP: 127/67 (18 Oct 2017 22:51) (110/91 - 187/118)  RR: 22 (18 Oct 2017 22:56) (18 - 27)  SpO2: 100% (18 Oct 2017 22:56) (90% - 100%) 57 F w/ asthma, AARON, and HTN p/w 2 weeks of worsening SOB and cough. Pt presented to PCP today and was found to be hypoxic to the 70s and transferred to ED for further management. Pt reports that she recently went on a trip and just came back last week. She has a hx of DVT on her L leg that she was not on anticoagulation. Her symptoms have been ongoing for the past 2 weeks and got so bad today that prompted her to seek medical attention. She endorses cough productive of clear sputum, denies any blood. Also endorsed generalized fatigue and weakness. Denies chest pain, fever, sweats, chills, or rhinorrhea. No nausea, vomiting, or abd pain. No sick contacts. Of note, pt is on CPAP at home and reports not using it for the past few nights as she lost the machine; she is not sure of how much CPAP she uses normally. While in the ED, she was found to have a new RML subsegmental PE and pulmonary edema. Pt given 120mg IV lasix, hep gtt, and placed on BiPAP. Admitted to medicine for further management.     Vital Signs Last 24 Hrs  T(C): 36.9 (18 Oct 2017 16:50), Max: 36.9 (18 Oct 2017 16:50)  T(F): 98.5 (18 Oct 2017 16:50), Max: 98.5 (18 Oct 2017 16:50)  HR: 110 (18 Oct 2017 22:56) (90 - 112)  BP: 127/67 (18 Oct 2017 22:51) (110/91 - 187/118)  RR: 22 (18 Oct 2017 22:56) (18 - 27)  SpO2: 100% (18 Oct 2017 22:56) (90% - 100%) 57 F w/ asthma, AARON, and HTN p/w 2 weeks of worsening SOB and cough. Pt presented to PCP today and was found to be hypoxic to the 70s and transferred to ED for further management. Pt reports that she recently went on a trip and just came back last week. She has a hx of DVT on her L leg 1-2 years ago that she was on coumadin x 6 months. Her symptoms have been ongoing for the past 2 weeks and got so bad today that prompted her to seek medical attention. She endorses cough productive of clear sputum, denies any blood. Also endorsed generalized fatigue and weakness. Denies chest pain, fever, sweats, chills, or rhinorrhea. No nausea, vomiting, or abd pain. No sick contacts. Of note, pt is on CPAP at home and reports not using it for the past few nights as she lost the machine; she is not sure of how much CPAP she uses normally. While in the ED, she was found to have a new RML subsegmental PE and pulmonary edema. Pt given 120mg IV lasix, hep gtt, and placed on BiPAP. Admitted to medicine for further management.     Vital Signs Last 24 Hrs  T(C): 36.9 (18 Oct 2017 16:50), Max: 36.9 (18 Oct 2017 16:50)  T(F): 98.5 (18 Oct 2017 16:50), Max: 98.5 (18 Oct 2017 16:50)  HR: 110 (18 Oct 2017 22:56) (90 - 112)  BP: 127/67 (18 Oct 2017 22:51) (110/91 - 187/118)  RR: 22 (18 Oct 2017 22:56) (18 - 27)  SpO2: 100% (18 Oct 2017 22:56) (90% - 100%)

## 2017-10-18 NOTE — ED PROVIDER NOTE - ATTENDING CONTRIBUTION TO CARE
Dr. EMANUEL Lam:  I have independently evaluated the patient and have documented in the appropriate sections above.  I agree with the exam and plan as noted above.

## 2017-10-18 NOTE — H&P ADULT - NSHPLABSRESULTS_GEN_ALL_CORE
15.3   12.9  )-----------( 253      ( 18 Oct 2017 16:52 )             49.9     10-18    146<H>  |  98  |  13  ----------------------------<  140<H>  3.8   |  38<H>  |  0.84    Ca    8.7      18 Oct 2017 16:52      CARDIAC MARKERS ( 18 Oct 2017 16:52 )  x     / <0.01 ng/mL / x     / x     / x          ABG - ( 18 Oct 2017 22:22 )  pH: 7.34  /  pCO2: 85    /  pO2: 77    / HCO3: 44    / Base Excess: 14.0  /  SaO2: 94                    PT/INR - ( 18 Oct 2017 16:52 )   PT: 12.4 sec;   INR: 1.13 ratio         PTT - ( 18 Oct 2017 16:52 )  PTT:29.2 sec    EKG: sinus rhythm, no ST/T wave changes    CTA:  < from: CT Angio Chest w/ IV Cont (10.18.17 @ 19:48) >    Subsegmental pulmonary embolism in the right middle lobe, likely with   superimposed edema.    < end of copied text > Labs: personally reviewed     15.3   12.9  )-----------( 253      ( 18 Oct 2017 16:52 )             49.9     10-18    146<H>  |  98  |  13  ----------------------------<  140<H>  3.8   |  38<H>  |  0.84    Ca    8.7      18 Oct 2017 16:52      CARDIAC MARKERS ( 18 Oct 2017 16:52 )  x     / <0.01 ng/mL / x     / x     / x          ABG - ( 18 Oct 2017 22:22 )  pH: 7.34  /  pCO2: 85    /  pO2: 77    / HCO3: 44    / Base Excess: 14.0  /  SaO2: 94                    PT/INR - ( 18 Oct 2017 16:52 )   PT: 12.4 sec;   INR: 1.13 ratio         PTT - ( 18 Oct 2017 16:52 )  PTT:29.2 sec    EKG personally reviewed : sinus rhythm, no ST/T wave changes    Imaging personally reviewed   CTA:  < from: CT Angio Chest w/ IV Cont (10.18.17 @ 19:48) >    Subsegmental pulmonary embolism in the right middle lobe, likely with   superimposed edema.    < end of copied text >

## 2017-10-18 NOTE — H&P ADULT - ATTENDING COMMENTS
Pt seen and examined at bedside.  I have precepted this case with house staff and agree with resident note above with changes made where appropriate.

## 2017-10-18 NOTE — H&P ADULT - PROBLEM SELECTOR PLAN 3
-Hold amlodipine and BB given tenuous hemodynamics. -unclear exact etiology; R heart failure/pHTN from chronic HF can account for peripheral edema but I suspect pt has underlying systolic dysfunction given hx of prior cardiac medications, fluid overload, and pt being referred to cardiologist.  Pt is unaware of any CHF dx and unaware of any recent TTE.  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   -c/w lasix 40 iv bid (pt has been on lasix in the past, and also on other diuretics, so not naive); pt given large dose x 1 (120 mg) in ED will c/w 40 IV bid and reassess response.   -strict i/o, daily weights  -low salt diet when able to tolerate some time off bipap

## 2017-10-18 NOTE — ED PROVIDER NOTE - CARDIAC, MLM
tachy rate, regular rhythm.  Heart sounds S1, S2.  No murmurs, rubs or gallops. NO jvd 2+ pitting edema

## 2017-10-18 NOTE — ED ADULT NURSE REASSESSMENT NOTE - NS ED NURSE REASSESS COMMENT FT1
As per conversation with MD Lam, pt is going to receive duoneb treatment as ordered.  MD lam aware of tachycardia in patient.  Will continue to monitor.

## 2017-10-18 NOTE — H&P ADULT - PROBLEM SELECTOR PLAN 7
-20 min spent at bedside discussing advanced directives. Despite elevated PCO2, pt is not obtunded and demonstrated insight into condition.  Currently, she is FULL CODE.

## 2017-10-18 NOTE — CONSULT NOTE ADULT - ASSESSMENT
The patient is a 57 Y.O. female with pmh of AARON on CPAP at home, no know settings, DVT 5 years ago s/p a/c, asthma no outpatient PFT's, T2dm, who presents with sob, cough and found to be hypoxic at PMD's office.     # Acute on chronic hypercapnic respiratory failure 2/2 to Asthma vs AARON/OHS vs ADHF w/wo pHTN.   - CTA showing pulmonary edema, small PE, PE alones does not explain degree of hypoxemia, cough, questionable history of asthma, LE edema on lasix.  - Would give nebs ATC, Duonebs q6,, albuterol q3PRN, c/w home meds for asthma. Give prednisone 40d x5 days less likely asthma but currently Benefits> risks  - Would r/o infectious, RVP, culture, sputum cx, legionella. Start levaquin. Has severe pcn allergy with respiratory failure in the past.   - Check an echo with bubble study to r/o shunting for degree of hypoxia, c/w lasix at least 40 daily. Responding. Short duration of Rivera for I and O's,   - Frequent BMP at least BID, repleate lytes and if bicarb increases need Diamox   - C/W with bipap at current setting. Would NOT hyperoxygenate. Keep sats 88-93. Repeat gas.  - Check LE DVT studies, subsegmental maynot need a/c but 2nd occurrence may need prolonged a/c.   - Will likely need pulm/cards follow up now and at discharge.    Dae Hyeon Kim MD-[id number]  Department of Internal Medicine  Pager [phone] The patient is a 57 Y.O. female with pmh of AARON on CPAP at home, no know settings, DVT 5 years ago s/p a/c, asthma no outpatient PFT's, T2dm, who presents with sob, cough and found to be hypoxic at PMD's office.     # Acute on chronic hypercapnic respiratory failure 2/2 to Asthma vs AARON/OHS vs ADHF w/wo pHTN.   - CTA showing pulmonary edema, small PE, PE alones does not explain degree of hypoxemia, cough, questionable history of asthma, LE edema on lasix.  - Would give nebs ATC, Duonebs q6,, albuterol q3PRN, c/w home meds for asthma. Give prednisone 40d x5 days less likely asthma but currently Benefits> risks  - Would r/o infectious, RVP, culture, sputum cx, legionella. Start levaquin. Has severe pcn allergy with respiratory failure in the past.   - Check an echo with bubble study to r/o shunting for degree of hypoxia, c/w lasix at least 40 daily. Responding. Short duration of Rivera for I and O's,   - Frequent BMP at least BID, repleate lytes and if bicarb increases need Diamox   - C/W with bipap at current setting. Would NOT hyperoxygenate. Keep sats < 88 %. Repeat gas.  - Check LE DVT studies, subsegmental maynot need a/c but 2nd occurrence may need prolonged a/c.   - Will likely need pulm/cards follow up now and at discharge.    Dae Hyeon Kim MD-[id number]  Department of Internal Medicine  Pager [phone]

## 2017-10-18 NOTE — ED PROVIDER NOTE - PROGRESS NOTE DETAILS
distress improved with nrb and nebs. o2 sat now low 90s. d/w micu for consult for persistent hypoxia despite bipap

## 2017-10-18 NOTE — H&P ADULT - PROBLEM SELECTOR PLAN 1
-Suspect hypoxia 2/2 to PE and pulmonary edema from ?new onset heart failure and hypercarbia from non-compliance with CPAP at home vs. asthma exacerbation. Elevated bicarb on BMP indicates pt is likely chronic pCO2 retainer. Pt w/ minimal improvement on BiPAP w/ increased O2 requirements, settings changed to 18/8 and MICU consulted.  -c/w diuresis w/ lasix 40mg IV BID and hep gtt  -Consider steroids although pt clinically does not appear to be in asthma exacerbation with no active wheezing on exam.  -Pt afebrile w/ no consolidations on CT, unlikely to be infectious etiology.   -Obtain TTE to assess cardiac function, cardiomegaly on CT. Hold BB for now. -Suspect hypoxia 2/2 to PE and pulmonary edema from ?new onset heart failure and hypercarbia from non-compliance with CPAP at home vs. asthma exacerbation. Elevated bicarb on BMP indicates pt is likely chronic pCO2 retainer. Pt w/ minimal improvement on BiPAP w/ increased O2 requirements, settings changed to 18/8 and MICU consulted.  -c/w diuresis w/ lasix 40mg IV BID and hep gtt  -Consider steroids although pt clinically does not appear to be in asthma exacerbation with no active wheezing on exam.  -Pt afebrile w/ no consolidations on CT, unlikely to be infectious etiology.   -Obtain TTE w/ bubble to assess cardiac function and to r/o intracardiac shunt, cardiomegaly on CT. Hold BB for now. -Suspect hypoxia 2/2 to PE and pulmonary edema from ?new onset heart failure and hypercarbia from non-compliance with CPAP at home vs. asthma exacerbation. Elevated bicarb on BMP indicates pt is likely chronic pCO2 retainer. Pt w/improvement on BiPAP w/ increased O2 requirements, settings changed to 18/8 and MICU consulted. Acidosis within normal range, baseline pCO2 may be near 70s. Will repeat ABG  -c/w diuresis w/ lasix 40mg IV BID and hep gtt  -Consider steroids although pt clinically does not appear to be in asthma exacerbation with no active wheezing on exam.  -Pt afebrile w/ no consolidations on CT, unlikely to be infectious etiology.   -Obtain TTE w/ bubble to assess cardiac function and to r/o intracardiac shunt, cardiomegaly on CT. Hold BB for now. -Suspect hypoxia 2/2 to PE and pulmonary edema from ?new onset heart failure and hypercarbia from non-compliance with CPAP at home vs. asthma exacerbation. Elevated bicarb on BMP indicates pt is likely chronic pCO2 retainer. Given pH 7.35 with PCO2 80s suspect her baseline with recent bipap nonadherence is ~PCO2 70-80.  Pt w/improvement on BiPAP w/ increased O2 requirements, settings changed to 18/8 and MICU consulted. Acidosis within normal range, baseline pCO2 may be near 70s. Will repeat blood gas on new settings  -c/w diuresis w/ lasix 40mg IV BID, c/w hep gtt for PE  -MICU recs appreciated, c/w prednisone  -Pt afebrile w/ no consolidations on CT, unlikely to be infectious etiology.   -Obtain TTE w/ bubble to assess cardiac function and to r/o intracardiac shunt, cardiomegaly on CT. Hold BB for now.

## 2017-10-18 NOTE — ED ADULT NURSE NOTE - EXTENSIONS OF SELF_ADULT

## 2017-10-19 DIAGNOSIS — Z71.89 OTHER SPECIFIED COUNSELING: ICD-10-CM

## 2017-10-19 DIAGNOSIS — J81.0 ACUTE PULMONARY EDEMA: ICD-10-CM

## 2017-10-19 LAB
ANION GAP SERPL CALC-SCNC: 12 MMOL/L — SIGNIFICANT CHANGE UP (ref 5–17)
ANION GAP SERPL CALC-SCNC: 13 MMOL/L — SIGNIFICANT CHANGE UP (ref 5–17)
APTT BLD: 127.1 SEC — CRITICAL HIGH (ref 27.5–37.4)
APTT BLD: 27.8 SEC — SIGNIFICANT CHANGE UP (ref 27.5–37.4)
APTT BLD: >200 SEC — CRITICAL HIGH (ref 27.5–37.4)
BUN SERPL-MCNC: 11 MG/DL — SIGNIFICANT CHANGE UP (ref 7–23)
BUN SERPL-MCNC: 11 MG/DL — SIGNIFICANT CHANGE UP (ref 7–23)
CALCIUM SERPL-MCNC: 8.1 MG/DL — LOW (ref 8.4–10.5)
CALCIUM SERPL-MCNC: 8.4 MG/DL — SIGNIFICANT CHANGE UP (ref 8.4–10.5)
CHLORIDE SERPL-SCNC: 94 MMOL/L — LOW (ref 96–108)
CHLORIDE SERPL-SCNC: 96 MMOL/L — SIGNIFICANT CHANGE UP (ref 96–108)
CK MB BLD-MCNC: 0.8 % — SIGNIFICANT CHANGE UP (ref 0–3.5)
CK MB CFR SERPL CALC: 1 NG/ML — SIGNIFICANT CHANGE UP (ref 0–3.8)
CK SERPL-CCNC: 119 U/L — SIGNIFICANT CHANGE UP (ref 25–170)
CO2 SERPL-SCNC: 35 MMOL/L — HIGH (ref 22–31)
CO2 SERPL-SCNC: 39 MMOL/L — HIGH (ref 22–31)
CREAT SERPL-MCNC: 0.76 MG/DL — SIGNIFICANT CHANGE UP (ref 0.5–1.3)
CREAT SERPL-MCNC: 0.87 MG/DL — SIGNIFICANT CHANGE UP (ref 0.5–1.3)
GAS PNL BLDV: SIGNIFICANT CHANGE UP
GAS PNL BLDV: SIGNIFICANT CHANGE UP
GLUCOSE SERPL-MCNC: 126 MG/DL — HIGH (ref 70–99)
GLUCOSE SERPL-MCNC: 138 MG/DL — HIGH (ref 70–99)
HCT VFR BLD CALC: 49.4 % — HIGH (ref 34.5–45)
HCT VFR BLD CALC: 49.6 % — HIGH (ref 34.5–45)
HGB BLD-MCNC: 14.1 G/DL — SIGNIFICANT CHANGE UP (ref 11.5–15.5)
HGB BLD-MCNC: 14.6 G/DL — SIGNIFICANT CHANGE UP (ref 11.5–15.5)
MAGNESIUM SERPL-MCNC: 1.8 MG/DL — SIGNIFICANT CHANGE UP (ref 1.6–2.6)
MAGNESIUM SERPL-MCNC: 1.8 MG/DL — SIGNIFICANT CHANGE UP (ref 1.6–2.6)
MCHC RBC-ENTMCNC: 23.3 PG — LOW (ref 27–34)
MCHC RBC-ENTMCNC: 24 PG — LOW (ref 27–34)
MCHC RBC-ENTMCNC: 28.5 GM/DL — LOW (ref 32–36)
MCHC RBC-ENTMCNC: 29.4 GM/DL — LOW (ref 32–36)
MCV RBC AUTO: 81.5 FL — SIGNIFICANT CHANGE UP (ref 80–100)
MCV RBC AUTO: 81.7 FL — SIGNIFICANT CHANGE UP (ref 80–100)
NRBC # BLD: 2 /100 WBCS — HIGH (ref 0–0)
PHOSPHATE SERPL-MCNC: 4.6 MG/DL — HIGH (ref 2.5–4.5)
PHOSPHATE SERPL-MCNC: 4.9 MG/DL — HIGH (ref 2.5–4.5)
PLATELET # BLD AUTO: 224 K/UL — SIGNIFICANT CHANGE UP (ref 150–400)
PLATELET # BLD AUTO: 229 K/UL — SIGNIFICANT CHANGE UP (ref 150–400)
POTASSIUM SERPL-MCNC: 4.3 MMOL/L — SIGNIFICANT CHANGE UP (ref 3.5–5.3)
POTASSIUM SERPL-MCNC: 5.1 MMOL/L — SIGNIFICANT CHANGE UP (ref 3.5–5.3)
POTASSIUM SERPL-SCNC: 4.3 MMOL/L — SIGNIFICANT CHANGE UP (ref 3.5–5.3)
POTASSIUM SERPL-SCNC: 5.1 MMOL/L — SIGNIFICANT CHANGE UP (ref 3.5–5.3)
RAPID RVP RESULT: SIGNIFICANT CHANGE UP
RBC # BLD: 6.06 M/UL — HIGH (ref 3.8–5.2)
RBC # BLD: 6.07 M/UL — HIGH (ref 3.8–5.2)
RBC # FLD: 19.2 % — HIGH (ref 10.3–14.5)
RBC # FLD: 20.6 % — HIGH (ref 10.3–14.5)
SODIUM SERPL-SCNC: 143 MMOL/L — SIGNIFICANT CHANGE UP (ref 135–145)
SODIUM SERPL-SCNC: 146 MMOL/L — HIGH (ref 135–145)
TROPONIN T SERPL-MCNC: <0.01 NG/ML — SIGNIFICANT CHANGE UP (ref 0–0.06)
WBC # BLD: 13.22 K/UL — HIGH (ref 3.8–10.5)
WBC # BLD: 15 K/UL — HIGH (ref 3.8–10.5)
WBC # FLD AUTO: 13.22 K/UL — HIGH (ref 3.8–10.5)
WBC # FLD AUTO: 15 K/UL — HIGH (ref 3.8–10.5)

## 2017-10-19 PROCEDURE — 99233 SBSQ HOSP IP/OBS HIGH 50: CPT | Mod: GC

## 2017-10-19 PROCEDURE — 71010: CPT | Mod: 26

## 2017-10-19 RX ORDER — ALBUTEROL 90 UG/1
2 AEROSOL, METERED ORAL
Qty: 0 | Refills: 0 | COMMUNITY

## 2017-10-19 RX ORDER — RIVAROXABAN 15 MG-20MG
15 KIT ORAL
Qty: 0 | Refills: 0 | Status: DISCONTINUED | OUTPATIENT
Start: 2017-10-19 | End: 2017-10-25

## 2017-10-19 RX ORDER — AZITHROMYCIN 500 MG/1
500 TABLET, FILM COATED ORAL ONCE
Qty: 0 | Refills: 0 | Status: COMPLETED | OUTPATIENT
Start: 2017-10-20 | End: 2017-10-20

## 2017-10-19 RX ORDER — AZITHROMYCIN 500 MG/1
250 TABLET, FILM COATED ORAL DAILY
Qty: 0 | Refills: 0 | Status: COMPLETED | OUTPATIENT
Start: 2017-10-21 | End: 2017-10-24

## 2017-10-19 RX ORDER — BUDESONIDE AND FORMOTEROL FUMARATE DIHYDRATE 160; 4.5 UG/1; UG/1
2 AEROSOL RESPIRATORY (INHALATION)
Qty: 0 | Refills: 0 | Status: DISCONTINUED | OUTPATIENT
Start: 2017-10-19 | End: 2017-10-25

## 2017-10-19 RX ADMIN — Medication 40 MILLIGRAM(S): at 06:06

## 2017-10-19 RX ADMIN — Medication 3 MILLILITER(S): at 13:39

## 2017-10-19 RX ADMIN — BUDESONIDE AND FORMOTEROL FUMARATE DIHYDRATE 2 PUFF(S): 160; 4.5 AEROSOL RESPIRATORY (INHALATION) at 20:24

## 2017-10-19 RX ADMIN — Medication 40 MILLIGRAM(S): at 18:48

## 2017-10-19 RX ADMIN — HEPARIN SODIUM 0 UNIT(S)/HR: 5000 INJECTION INTRAVENOUS; SUBCUTANEOUS at 03:59

## 2017-10-19 RX ADMIN — Medication 3 MILLILITER(S): at 08:37

## 2017-10-19 RX ADMIN — HEPARIN SODIUM 1500 UNIT(S)/HR: 5000 INJECTION INTRAVENOUS; SUBCUTANEOUS at 12:09

## 2017-10-19 RX ADMIN — Medication 3 MILLILITER(S): at 23:58

## 2017-10-19 RX ADMIN — Medication 3 MILLILITER(S): at 19:10

## 2017-10-19 RX ADMIN — HEPARIN SODIUM 1800 UNIT(S)/HR: 5000 INJECTION INTRAVENOUS; SUBCUTANEOUS at 05:12

## 2017-10-19 RX ADMIN — RIVAROXABAN 15 MILLIGRAM(S): KIT at 18:49

## 2017-10-19 NOTE — PROGRESS NOTE ADULT - SUBJECTIVE AND OBJECTIVE BOX
Patient is a 57y old  Female who presents with a chief complaint of SOB, cough (18 Oct 2017 22:35)      SUBJECTIVE / OVERNIGHT EVENTS: Patient states she feels significantly better than when she came in. She reports she does not feel as fatigued or short of breath. She is not coughing. Denies any CP, palpitations, lightheadedness, abdominal pain, N/V, dysuria, constipation, diarrhea.     MEDICATIONS  (STANDING):  ALBUTerol/ipratropium for Nebulization 3 milliLiter(s) Nebulizer every 6 hours  ALBUTerol/ipratropium for Nebulization. 3 milliLiter(s) Nebulizer once  ALBUTerol/ipratropium for Nebulization. 3 milliLiter(s) Nebulizer once  buDESOnide  80 MICROgram(s)/formoterol 4.5 MICROgram(s) Inhaler 2 Puff(s) Inhalation two times a day  furosemide   Injectable 40 milliGRAM(s) IV Push two times a day  heparin  Infusion.  Unit(s)/Hr (22 mL/Hr) IV Continuous <Continuous>    MEDICATIONS  (PRN):  heparin  Injectable 42990 Unit(s) IV Push every 6 hours PRN For aPTT less than 40  heparin  Injectable 5000 Unit(s) IV Push every 6 hours PRN For aPTT between 40 - 57        PHYSICAL EXAM:  GENERAL : Obese, alert and cooperative on BiPAP. NAD.   HEAD: NC/AT  EYES: clear conjunctiva, EOMI  NECK: Neck supple, non-tender without lymphadenopathy, masses, No JVD, acanthosis nigricans noted  HEART: Normal S1 and S2. Tachy, No S3 or splitting  LUNGS: Decreased breath sounds bilaterally, no wheezing, few crackles at bases  ABDOMEN: Soft, nondistended, tender to palpation of ventral hernia. No guarding or rebound.   MUSKULOSKELETAL: No joint erythema or tenderness.   EXTREMITIES: 2-3+ b/l pitting LE edema, L worse than R, pulses cannot be appreciated due to edema and body habitus  NEUROLOGICAL: No focal neurologic deficit. Strength and sensation symmetric and intact throughout.   SKIN: Skin clean, dry, intact  PSYCHIATRIC: AOx3.Normal affect and behavior.    LABS:  (10-19 @ 03:01)                        14.6  15.0 )-----------( 229                 49.6    Neutrophils = -- (--%)  Lymphocytes = -- (--%)  Eosinophils = -- (--%)  Basophils = -- (--%)  Monocytes = -- (--%)  Bands = --%    WBC Trend: 15.0<--, 12.9<--  Hb Trend: 14.6<--, 15.3<--  Plt Trend: 229<--, 253<--  10-19    146<H>  |  94<L>  |  11  ----------------------------<  126<H>  4.3   |  39<H>  |  0.87    Ca    8.4      19 Oct 2017 07:25  Phos  4.6     10-19  Mg     1.8     10-19      Creatinine Trend: 0.87<--, 0.76<--, 0.84<--  ( 18 Oct 2017 16:52 )   PT: 12.4 sec;   INR: 1.13 ratio;       PTT:>200.0 sec  CARDIAC MARKERS ( 19 Oct 2017 00:59 )  Trop <0.01 ng/mL /  U/L / CKMB x       CARDIAC MARKERS ( 18 Oct 2017 16:52 )  Trop <0.01 ng/mL / CK x     / CKMB x               Microbiology:  Urine Cx:  Blood Cx:    RADIOLOGY & ADDITIONAL TESTS:  X- Ray: < from: Xray Chest 1 View AP -PORTABLE-Routine (10.19.17 @ 00:16) >  The heart is enlarged. Pulmonary vascular congestion. Left lower lobe   pneumonia and/or atelectasis cannot be ruled out entirely.    CT: < from: CT Angio Chest w/ IV Cont (10.18.17 @ 19:48) >  IMPRESSION:   Subsegmental pulmonary embolism in the right middle lobe, likely with   superimposed edema.  Bilateral lower lobe atelectasis.   Bilateral upper lobe groundglass opacity and mild interlobular septal   thickening. No pleural effusion or pneumothorax.      Consultant(s) Notes Reviewed:      Care Discussed with Consultants/Other Providers: Patient is a 57y old  Female who presents with a chief complaint of SOB, cough (18 Oct 2017 22:35)      SUBJECTIVE / OVERNIGHT EVENTS: Patient states she feels significantly better than when she came in. She reports she does not feel as fatigued or short of breath. She is not coughing. Denies any CP, palpitations, lightheadedness, abdominal pain, N/V, dysuria, constipation, diarrhea.     MEDICATIONS  (STANDING):  ALBUTerol/ipratropium for Nebulization 3 milliLiter(s) Nebulizer every 6 hours  ALBUTerol/ipratropium for Nebulization. 3 milliLiter(s) Nebulizer once  ALBUTerol/ipratropium for Nebulization. 3 milliLiter(s) Nebulizer once  buDESOnide  80 MICROgram(s)/formoterol 4.5 MICROgram(s) Inhaler 2 Puff(s) Inhalation two times a day  furosemide   Injectable 40 milliGRAM(s) IV Push two times a day  heparin  Infusion.  Unit(s)/Hr (22 mL/Hr) IV Continuous <Continuous>    MEDICATIONS  (PRN):  heparin  Injectable 54565 Unit(s) IV Push every 6 hours PRN For aPTT less than 40  heparin  Injectable 5000 Unit(s) IV Push every 6 hours PRN For aPTT between 40 - 57        PHYSICAL EXAM:  GENERAL : Obese, alert and cooperative on BiPAP. NAD.   HEAD: NC/AT  EYES: clear conjunctiva, EOMI  NECK: Neck supple, non-tender without lymphadenopathy, masses, No JVD, acanthosis nigricans noted  HEART: Normal S1 and S2. Tachy, No S3 or splitting  LUNGS: Decreased breath sounds bilaterally, no wheezing, few crackles at bases  ABDOMEN: Soft, nondistended, tender to palpation of ventral hernia. No guarding or rebound.   MUSKULOSKELETAL: No joint erythema or tenderness.   EXTREMITIES: 2-3+ b/l pitting LE edema, L worse than R, pulses cannot be appreciated due to edema and body habitus  NEUROLOGICAL: No focal neurologic deficit. Strength and sensation symmetric and intact throughout.   SKIN: Skin clean, dry, intact  PSYCHIATRIC: AOx3.Normal affect and behavior.    LABS:  (10-19 @ 03:01)                        14.6  15.0 )-----------( 229                 49.6    Neutrophils = -- (--%)  Lymphocytes = -- (--%)  Eosinophils = -- (--%)  Basophils = -- (--%)  Monocytes = -- (--%)  Bands = --%    WBC Trend: 15.0<--, 12.9<--  Hb Trend: 14.6<--, 15.3<--  Plt Trend: 229<--, 253<--  10-19    146<H>  |  94<L>  |  11  ----------------------------<  126<H>  4.3   |  39<H>  |  0.87    Ca    8.4      19 Oct 2017 07:25  Phos  4.6     10-19  Mg     1.8     10-19      Creatinine Trend: 0.87<--, 0.76<--, 0.84<--  ( 18 Oct 2017 16:52 )   PT: 12.4 sec;   INR: 1.13 ratio;       PTT:>200.0 sec  CARDIAC MARKERS ( 19 Oct 2017 00:59 )  Trop <0.01 ng/mL /  U/L / CKMB x       CARDIAC MARKERS ( 18 Oct 2017 16:52 )  Trop <0.01 ng/mL / CK x     / CKMB x               Microbiology:  Urine Cx:  Blood Cx:    RADIOLOGY & ADDITIONAL TESTS:  X- Ray: < from: Xray Chest 1 View AP -PORTABLE-Routine (10.19.17 @ 00:16) >  The heart is enlarged. Pulmonary vascular congestion. Left lower lobe   pneumonia and/or atelectasis cannot be ruled out entirely.    CT: < from: CT Angio Chest w/ IV Cont (10.18.17 @ 19:48) >  IMPRESSION:   Subsegmental pulmonary embolism in the right middle lobe, likely with   superimposed edema.  Bilateral lower lobe atelectasis.   Bilateral upper lobe groundglass opacity and mild interlobular septal   thickening. No pleural effusion or pneumothorax.      Consultant(s) Notes Reviewed:  pulm    Care Discussed with Consultants/Other Providers: pulm

## 2017-10-19 NOTE — PROGRESS NOTE ADULT - PROBLEM SELECTOR PLAN 4
-Possibly a component of asthma exacerbation contributing to her symptoms, significantly improved with bipap and lasix therefore more likely due to pulm edema, afebrile, no fevers or chills at home, small amount of white sputum production at home  - RVP negative  - d/c prednisone and levaquin for now  - will c/w duonebs q6 for now  - f/u blood cultures -Possibly a component of asthma exacerbation contributing to her symptoms, significantly improved with bipap and lasix therefore more likely due to pulm edema, afebrile, no fevers or chills at home, small amount of white sputum production at home  - RVP negative  - d/c prednisone, will do azithro 5 day course for CAP  - will c/w duonebs q6 for now  - f/u blood cultures

## 2017-10-19 NOTE — PROVIDER CONTACT NOTE (CRITICAL VALUE NOTIFICATION) - ASSESSMENT
VSS, Labs,
Pt A&Ox4. No distress or discomfort noted at this time.  Tele monitor in placed, SR at 100 noted.

## 2017-10-19 NOTE — PROGRESS NOTE ADULT - ASSESSMENT
57 F w/ asthma, AARON, HTN p/w hypercapnic and hypoxic respiratory failure found to have subsegmental PE and pulmonary edema. Suspect hypoxia 2/2 to pulmonary edema from ?undiagnosed heart failure and hypercarbia from non-compliance with CPAP at home vs. asthma exacerbation.

## 2017-10-19 NOTE — PROGRESS NOTE ADULT - PROBLEM SELECTOR PLAN 1
-Suspect hypoxia 2/2 to pulmonary edema from ?new onset heart failure and hypercarbia from non-compliance with CPAP at home vs. asthma exacerbation  - Elevated bicarb on BMP, pt is likely chronic pCO2 retainer  - improvement on BiPAP when seen this morning, on 18/8 with FiO2 65% saturating 95%, will give small breaks for water and wean down FiO2  -c/w diuresis w/ lasix 40mg IV BID  -will switch heparin gtt to xarelto for PE as this is 2nd provoked event and needs lifelong AC  -Pt afebrile w/ no consolidations on CT, unlikely to be infectious etiology, d/c levaquin  - f/u TTE w/ bubble to assess cardiac function and to r/o intracardiac shunt -Suspect hypoxia 2/2 to pulmonary edema from ?new onset heart failure and hypercarbia from non-compliance with CPAP at home vs. asthma exacerbation vs CAP  - Elevated bicarb on BMP, pt is likely chronic pCO2 retainer  - improvement on BiPAP when seen this morning, on 18/8 with FiO2 65% saturating 95%, will give small breaks for water and wean down FiO2  -c/w diuresis w/ lasix 40mg IV BID  -will switch heparin gtt to xarelto for PE as this is 2nd provoked event and needs lifelong AC  -Pulm consult - will do 5 day course of azithro  - f/u TTE w/ bubble to assess cardiac function and to r/o intracardiac shunt

## 2017-10-19 NOTE — PROGRESS NOTE ADULT - PROBLEM SELECTOR PLAN 2
- will change heparin gtt to xarelto  - sinus tachycardia on monitor, will observe on tele  - saturating well on FiO2 65% bipap, will continue to wean down as tolerated

## 2017-10-19 NOTE — PROGRESS NOTE ADULT - PROBLEM SELECTOR PLAN 3
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

## 2017-10-19 NOTE — PROVIDER CONTACT NOTE (CRITICAL VALUE NOTIFICATION) - ACTION/TREATMENT ORDERED:
Monitor pt
Will continue to monitor.  Rate will be adjusted by Chani STEPHENS and Jennifer STEPHENS.

## 2017-10-19 NOTE — CONSULT NOTE ADULT - SUBJECTIVE AND OBJECTIVE BOX
CHIEF COMPLAINT: Dyspnea    HPI:   57 F with history of AARON on CPAP - does not know settings, DVT (provoked, ) previously on Coumadin, asthma, DM2, and obesity who presented with cough and dyspnea. Went to see her PCP who sent her in for hypoxia (reportedly in the 60s). Patient complains of dyspnea and productive cough with clear sputum worsening over past week. Recently patient was out in [place] and was sitting in her car for a prolonged period of time (approx 2-3 hours).    On presentation was hypercarbic (pH 7.34, pCO2 85) on ABG. MICU initially consulted but patient improved with Lasix and BiPAP. CTPA was done and showed small right subsegmental PE. Patient currently doing well on BiPAP. Is mildly SOB but feels better.    PAST MEDICAL & SURGICAL HISTORY:  Pneumomediastinum  Umbilical hernia: Reduciable  Osteoarthritis of both knees, unspecified osteoarthritis type  AARON (Obstructive Sleep Apnea): category II pt uses c/pap pt to bering to hospital/  OR booking notified  Pneumonia:   GERD (Gastroesophageal Reflux Disease)  Asthma: diagnosed   on adbvair denies attacks  DVT (Deep Venous Thrombosis): left leg 6 m s/p knee replacement in   HTN (Hypertension)  H/O ileostomy: Ileostomy Revesal   S/P LEEP:   S/P Exploratory Laparotomy:   peritonitis  s/p right knee replacement/ colon resection and ileostomy  S/P Colonoscopy:   S/P Endoscopy:  gerd  S/P  Section:   History of Tubal Ligation: s/p c/section   S/P Knee Surgery: 2010      FAMILY HISTORY:  Family history of gastric cancer  Family history of breast cancer (Grandparent)      SOCIAL HISTORY:  Smoking: [ ] Never Smoked [ ] Former Smoker (__ packs x ___ years) [ ] Current Smoker  (__ packs x ___ years)x  Substance Use: [x] Never Used [ ] Used ____  EtOH Use: Denies  Marital Status: [ ] Single [ ]  [ ]  [ ]   Sexual History:   Occupation:  Recent Travel:  Country of Birth:  Advance Directives:    Allergies    Kiwi (Unknown)  latex (Anaphylaxis)  latex (Unknown)  penicillin (Other)  penicillin (Unknown)  perfume  hives (Other)  potassium acetate (Other)  soap additives hives (Other)    Intolerances        HOME MEDICATIONS:    REVIEW OF SYSTEMS:  Constitutional: [ ] negative [-] fevers [-] chills [-] weight loss [ ] weight gain  HEENT: [ ] negative [ ] dry eyes [ ] eye irritation [ ] postnasal drip [ ] nasal congestion  CV: [ ] negative  [-] chest pain [-] orthopnea [-] palpitations [ ] murmur  Resp: [ ] negative [+] cough [+] shortness of breath [ ] dyspnea [+] wheezing [+] sputum [-] hemoptysis  GI: [ ] negative [-] nausea [-] vomiting [-] diarrhea [-] constipation [-] abd pain [ ] dysphagia   : [ ] negative [-] dysuria [ ] nocturia [ ] hematuria [ ] increased urinary frequency  Musculoskeletal: [-] negative [ ] back pain [ ] myalgias [ ] arthralgias [ ] fracture  Skin: [-] negative [ ] rash [ ] itch  Neurological: [-] negative [ ] headache [ ] dizziness [ ] syncope [ ] weakness [ ] numbness  Psychiatric: [-] negative [ ] anxiety [ ] depression  Endocrine: [ ] negative [+] diabetes [ ] thyroid problem  Hematologic/Lymphatic: [ ] negative [ ] anemia [ ] bleeding problem  Allergic/Immunologic: [ ] negative [ ] itchy eyes [ ] nasal discharge [ ] hives [ ] angioedema  [ ] All other systems negative  [ ] Unable to assess ROS because ________    OBJECTIVE:  ICU Vital Signs Last 24 Hrs  T(C): 36.5 (19 Oct 2017 13:48), Max: 37.3 (19 Oct 2017 02:48)  T(F): 97.7 (19 Oct 2017 13:48), Max: 99.1 (19 Oct 2017 02:48)  HR: 103 (19 Oct 2017 14:45) (85 - 112)  BP: 118/57 (19 Oct 2017 13:48) (103/69 - 187/118)  BP(mean): --  ABP: --  ABP(mean): --  RR: 20 (19 Oct 2017 13:48) (18 - 27)  SpO2: 95% (19 Oct 2017 14:45) (90% - 100%)        CAPILLARY BLOOD GLUCOSE          PHYSICAL EXAM:  General: Comfortable   HEENT:   Lymph Nodes:  Neck:   Respiratory:   Cardiovascular:   Abdomen:   Extremities:   Skin:   Neurological:  Psychiatry:    HOSPITAL MEDICATIONS:  rivaroxaban 15 milliGRAM(s) Oral two times a day      furosemide   Injectable 40 milliGRAM(s) IV Push two times a day      ALBUTerol/ipratropium for Nebulization 3 milliLiter(s) Nebulizer every 6 hours  ALBUTerol/ipratropium for Nebulization. 3 milliLiter(s) Nebulizer once  buDESOnide  80 MICROgram(s)/formoterol 4.5 MICROgram(s) Inhaler 2 Puff(s) Inhalation two times a day                      LABS:                        14.6   15.0  )-----------( 229      ( 19 Oct 2017 03:01 )             49.6     Hgb Trend: 14.6<--, 15.3<--  1019    146<H>  |  94<L>  |  11  ----------------------------<  126<H>  4.3   |  39<H>  |  0.87    Ca    8.4      19 Oct 2017 07:25  Phos  4.6     10-19  Mg     1.8     10-19      Creatinine Trend: 0.87<--, 0.76<--, 0.84<--  PT/INR - ( 18 Oct 2017 16:52 )   PT: 12.4 sec;   INR: 1.13 ratio         PTT - ( 19 Oct 2017 11:06 )  PTT:127.1 sec    Arterial Blood Gas:  10-18 @ 22:22  7.34/85/77/44/94/14.0  ABG lactate: --    Venous Blood Gas:  10-19 @ 06:09  7.28/103/60/47/86  VBG Lactate: 2.0  Venous Blood Gas:  10-19 @ 02:36  7.33/89/86/46/96  VBG Lactate: 1.1  Venous Blood Gas:  10-18 @ 18:52  7.25/107/42/45/61  VBG Lactate: 1.2  Venous Blood Gas:  10-18 @ 16:52  7.28/93/46/42/70  VBG Lactate: 1.5      MICROBIOLOGY:     RADIOLOGY:  [ ] Reviewed and interpreted by me    PULMONARY FUNCTION TESTS:    EKG: CHIEF COMPLAINT: Dyspnea    HPI:   57 F with history of AARON on CPAP - does not know settings, DVT (provoked, ) previously on Coumadin, asthma, DM2, and obesity who presented with cough and dyspnea. Went to see her PCP who sent her in for hypoxia (reportedly in the 60s). Patient complains of dyspnea and productive cough with clear sputum worsening over past week. Recently patient was out in [place] and was sitting in her car for a prolonged period of time (approx 2-3 hours).    On presentation was hypercarbic (pH 7.34, pCO2 85) on ABG. MICU initially consulted but patient improved with Lasix and BiPAP. CTPA was done and showed small right subsegmental PE. Patient currently doing well on BiPAP. Is mildly SOB but feels better.    PAST MEDICAL & SURGICAL HISTORY:  Pneumomediastinum  Umbilical hernia: Reduciable  Osteoarthritis of both knees, unspecified osteoarthritis type  AARON (Obstructive Sleep Apnea): category II pt uses c/pap pt to bering to hospital/  OR booking notified  Pneumonia:   GERD (Gastroesophageal Reflux Disease)  Asthma: diagnosed   on adbvair denies attacks  DVT (Deep Venous Thrombosis): left leg 6 m s/p knee replacement in   HTN (Hypertension)  H/O ileostomy: Ileostomy Revesal   S/P LEEP:   S/P Exploratory Laparotomy:   peritonitis  s/p right knee replacement/ colon resection and ileostomy  S/P Colonoscopy:   S/P Endoscopy:  gerd  S/P  Section:   History of Tubal Ligation: s/p c/section   S/P Knee Surgery: 2010      FAMILY HISTORY:  Family history of gastric cancer  Family history of breast cancer (Grandparent)      SOCIAL HISTORY:  Smoking: [ ] Never Smoked [ ] Former Smoker (__ packs x ___ years) [ ] Current Smoker  (__ packs x ___ years)x  Substance Use: [x] Never Used [ ] Used ____  EtOH Use: Denies  Marital Status: [ ] Single [ ]  [ ]  [ ]   Sexual History:   Occupation:  Recent Travel:  Country of Birth:  Advance Directives:    Allergies    Kiwi (Unknown)  latex (Anaphylaxis)  latex (Unknown)  penicillin (Other)  penicillin (Unknown)  perfume  hives (Other)  potassium acetate (Other)  soap additives hives (Other)    Intolerances        HOME MEDICATIONS:    REVIEW OF SYSTEMS:  Constitutional: [ ] negative [-] fevers [-] chills [-] weight loss [ ] weight gain  HEENT: [ ] negative [ ] dry eyes [ ] eye irritation [ ] postnasal drip [ ] nasal congestion  CV: [ ] negative  [-] chest pain [-] orthopnea [-] palpitations [ ] murmur  Resp: [ ] negative [+] cough [+] shortness of breath [ ] dyspnea [+] wheezing [+] sputum [-] hemoptysis  GI: [ ] negative [-] nausea [-] vomiting [-] diarrhea [-] constipation [-] abd pain [ ] dysphagia   : [ ] negative [-] dysuria [ ] nocturia [ ] hematuria [ ] increased urinary frequency  Musculoskeletal: [-] negative [ ] back pain [ ] myalgias [ ] arthralgias [ ] fracture  Skin: [-] negative [ ] rash [ ] itch  Neurological: [-] negative [ ] headache [ ] dizziness [ ] syncope [ ] weakness [ ] numbness  Psychiatric: [-] negative [ ] anxiety [ ] depression  Endocrine: [ ] negative [+] diabetes [ ] thyroid problem  Hematologic/Lymphatic: [ ] negative [ ] anemia [ ] bleeding problem  Allergic/Immunologic: [ ] negative [ ] itchy eyes [ ] nasal discharge [ ] hives [ ] angioedema  [ ] All other systems negative  [ ] Unable to assess ROS because ________    OBJECTIVE:  ICU Vital Signs Last 24 Hrs  T(C): 36.5 (19 Oct 2017 13:48), Max: 37.3 (19 Oct 2017 02:48)  T(F): 97.7 (19 Oct 2017 13:48), Max: 99.1 (19 Oct 2017 02:48)  HR: 103 (19 Oct 2017 14:45) (85 - 112)  BP: 118/57 (19 Oct 2017 13:48) (103/69 - 187/118)  BP(mean): --  ABP: --  ABP(mean): --  RR: 20 (19 Oct 2017 13:48) (18 - 27)  SpO2: 95% (19 Oct 2017 14:45) (90% - 100%)        CAPILLARY BLOOD GLUCOSE          PHYSICAL EXAM:  General: Comfortable on BiPAP, Obese  Neck: Unable to assess JVD due to body habitus  Respiratory: CTAB, no wheezing (on BiPAP)  Cardiovascular: RRR, no murmur, nonpitting edema of LE  Abdomen: Soft, nontender  Skin: Intact  Neurological: A&Ox3  Psychiatry: [facility] MEDICATIONS:  rivaroxaban 15 milliGRAM(s) Oral two times a day      furosemide   Injectable 40 milliGRAM(s) IV Push two times a day      ALBUTerol/ipratropium for Nebulization 3 milliLiter(s) Nebulizer every 6 hours  ALBUTerol/ipratropium for Nebulization. 3 milliLiter(s) Nebulizer once  buDESOnide  80 MICROgram(s)/formoterol 4.5 MICROgram(s) Inhaler 2 Puff(s) Inhalation two times a day                      LABS:                        14.6   15.0  )-----------( 229      ( 19 Oct 2017 03:01 )             49.6     Hgb Trend: 14.6<--, 15.3<--  1019    146<H>  |  94<L>  |  11  ----------------------------<  126<H>  4.3   |  39<H>  |  0.87    Ca    8.4      19 Oct 2017 07:25  Phos  4.6     10-19  Mg     1.8     10-19      Creatinine Trend: 0.87<--, 0.76<--, 0.84<--  PT/INR - ( 18 Oct 2017 16:52 )   PT: 12.4 sec;   INR: 1.13 ratio         PTT - ( 19 Oct 2017 11:06 )  PTT:127.1 sec    Arterial Blood Gas:  10-18 @ 22:22  7.34/85/77/44/94/14.0  ABG lactate: --    Venous Blood Gas:  10-19 @ 06:09  7.28/103/60/47/86  VBG Lactate: 2.0  Venous Blood Gas:  10-19 @ 02:36  7.33/89/86/46/96  VBG Lactate: 1.1  Venous Blood Gas:  10-18 @ 18:52  7.25/107/42/45/61  VBG Lactate: 1.2  Venous Blood Gas:  10-18 @ 16:52  7.28/93/46/42/70  VBG Lactate: 1.5      MICROBIOLOGY:     RADIOLOGY:  [x] Reviewed and interpreted by me    CT chest consistent with pulmonary edema, cardiomegaly, LLL opacity  Also with right subsegmental PE      PULMONARY FUNCTION TESTS:    EKG:    ASSESSMENT AND RECOMMENDATION    57 F with history of AARON on CPAP - does not know settings, DVT (provoked, 2012) previously on Coumadin, asthma, DM2, and obesity who presents with cough and dyspnea. Etiology of symptoms multifactorial. There is evidence of PE on CT chest. Imaging also consistent with fluid overload with possible pneumonia in LLL. She probably had obesity hypoventilation syndrome and is a chronic retainer (considering that her pCO2 in ABG was in the 80s with a near normal pH / elevated bicarb). It is likely that the patient has little reserve and decompensated given her various acute issues.    Pulmonary edema  -Agree with obtaining a TTE to evaluate for HF   -BNP was not impressively high but may be falsely low in obese patients  -Diurese with IV Lasix    Acute on chronic hypercapneic resp failure in setting of likely AARON/OHS  -Maintain BiPAP at current setting  -Decreased FiO2 to 55% (was on 65% earlier saturating 100%)  -Will reassess in AM    LLL opacity  -Would treat for CAP given symptoms and elevated WBC count  -Can start 5 day course of azithromycin  -Follow up cultures    PE  On Xarelto per primary team    Prasanth Mcnulty MD  Pulmonary and Critical Care Fellow  #[phone] CHIEF COMPLAINT: Dyspnea    HPI:   57 F with history of AARON on CPAP - does not know settings, DVT (provoked, ) previously on Coumadin, asthma, DM2, and obesity who presented with cough and dyspnea. Went to see her PCP who sent her in for hypoxia (reportedly in the 60s). Patient complains of dyspnea and productive cough with clear sputum worsening over past week. Recently patient was out in [place] and was sitting in her car for a prolonged period of time (approx 2-3 hours).    On presentation was hypercarbic (pH 7.34, pCO2 85) on ABG. MICU initially consulted but patient improved with Lasix and BiPAP. CTPA was done and showed small right subsegmental PE. Patient currently doing well on BiPAP. Is mildly SOB but feels better.    PAST MEDICAL & SURGICAL HISTORY:  Pneumomediastinum  Umbilical hernia: Reduciable  Osteoarthritis of both knees, unspecified osteoarthritis type  AARON (Obstructive Sleep Apnea): category II pt uses c/pap pt to bering to hospital/  OR booking notified  Pneumonia:   GERD (Gastroesophageal Reflux Disease)  Asthma: diagnosed   on adbvair denies attacks  DVT (Deep Venous Thrombosis): left leg 6 m s/p knee replacement in   HTN (Hypertension)  H/O ileostomy: Ileostomy Revesal   S/P LEEP:   S/P Exploratory Laparotomy:   peritonitis  s/p right knee replacement/ colon resection and ileostomy  S/P Colonoscopy:   S/P Endoscopy:  gerd  S/P  Section:   History of Tubal Ligation: s/p c/section   S/P Knee Surgery: 2010      FAMILY HISTORY:  Family history of gastric cancer  Family history of breast cancer (Grandparent)      SOCIAL HISTORY:  Smoking: [ ] Never Smoked [ ] Former Smoker (__ packs x ___ years) [ ] Current Smoker  (__ packs x ___ years)x  Substance Use: [x] Never Used [ ] Used ____  EtOH Use: Denies  Marital Status: [ ] Single [ ]  [ ]  [ ]   Sexual History:   Occupation:  Recent Travel:  Country of Birth:  Advance Directives:    Allergies    Kiwi (Unknown)  latex (Anaphylaxis)  latex (Unknown)  penicillin (Other)  penicillin (Unknown)  perfume  hives (Other)  potassium acetate (Other)  soap additives hives (Other)    Intolerances        HOME MEDICATIONS:    REVIEW OF SYSTEMS:  Constitutional: [ ] negative [-] fevers [-] chills [-] weight loss [ ] weight gain  HEENT: [ ] negative [ ] dry eyes [ ] eye irritation [ ] postnasal drip [ ] nasal congestion  CV: [ ] negative  [-] chest pain [-] orthopnea [-] palpitations [ ] murmur  Resp: [ ] negative [+] cough [+] shortness of breath [ ] dyspnea [+] wheezing [+] sputum [-] hemoptysis  GI: [ ] negative [-] nausea [-] vomiting [-] diarrhea [-] constipation [-] abd pain [ ] dysphagia   : [ ] negative [-] dysuria [ ] nocturia [ ] hematuria [ ] increased urinary frequency  Musculoskeletal: [-] negative [ ] back pain [ ] myalgias [ ] arthralgias [ ] fracture  Skin: [-] negative [ ] rash [ ] itch  Neurological: [-] negative [ ] headache [ ] dizziness [ ] syncope [ ] weakness [ ] numbness  Psychiatric: [-] negative [ ] anxiety [ ] depression  Endocrine: [ ] negative [+] diabetes [ ] thyroid problem  Hematologic/Lymphatic: [ ] negative [ ] anemia [ ] bleeding problem  Allergic/Immunologic: [ ] negative [ ] itchy eyes [ ] nasal discharge [ ] hives [ ] angioedema  [ ] All other systems negative  [ ] Unable to assess ROS because ________    OBJECTIVE:  ICU Vital Signs Last 24 Hrs  T(C): 36.5 (19 Oct 2017 13:48), Max: 37.3 (19 Oct 2017 02:48)  T(F): 97.7 (19 Oct 2017 13:48), Max: 99.1 (19 Oct 2017 02:48)  HR: 103 (19 Oct 2017 14:45) (85 - 112)  BP: 118/57 (19 Oct 2017 13:48) (103/69 - 187/118)  BP(mean): --  ABP: --  ABP(mean): --  RR: 20 (19 Oct 2017 13:48) (18 - 27)  SpO2: 95% (19 Oct 2017 14:45) (90% - 100%)        CAPILLARY BLOOD GLUCOSE          PHYSICAL EXAM:  General: Comfortable on BiPAP, Obese  Neck: Unable to assess JVD due to body habitus  Respiratory: CTAB, no wheezing (on BiPAP)  Cardiovascular: RRR, no murmur, nonpitting edema of LE  Abdomen: Soft, nontender  Skin: Intact  Neurological: A&Ox3  Psychiatry: [facility] MEDICATIONS:  rivaroxaban 15 milliGRAM(s) Oral two times a day      furosemide   Injectable 40 milliGRAM(s) IV Push two times a day      ALBUTerol/ipratropium for Nebulization 3 milliLiter(s) Nebulizer every 6 hours  ALBUTerol/ipratropium for Nebulization. 3 milliLiter(s) Nebulizer once  buDESOnide  80 MICROgram(s)/formoterol 4.5 MICROgram(s) Inhaler 2 Puff(s) Inhalation two times a day                      LABS:                        14.6   15.0  )-----------( 229      ( 19 Oct 2017 03:01 )             49.6     Hgb Trend: 14.6<--, 15.3<--  1019    146<H>  |  94<L>  |  11  ----------------------------<  126<H>  4.3   |  39<H>  |  0.87    Ca    8.4      19 Oct 2017 07:25  Phos  4.6     10-19  Mg     1.8     10-19      Creatinine Trend: 0.87<--, 0.76<--, 0.84<--  PT/INR - ( 18 Oct 2017 16:52 )   PT: 12.4 sec;   INR: 1.13 ratio         PTT - ( 19 Oct 2017 11:06 )  PTT:127.1 sec    Arterial Blood Gas:  10-18 @ 22:22  7.34/85/77/44/94/14.0  ABG lactate: --    Venous Blood Gas:  10-19 @ 06:09  7.28/103/60/47/86  VBG Lactate: 2.0  Venous Blood Gas:  10-19 @ 02:36  7.33/89/86/46/96  VBG Lactate: 1.1  Venous Blood Gas:  10-18 @ 18:52  7.25/107/42/45/61  VBG Lactate: 1.2  Venous Blood Gas:  10-18 @ 16:52  7.28/93/46/42/70  VBG Lactate: 1.5      MICROBIOLOGY:     RADIOLOGY:  [x] Reviewed and interpreted by me    CT chest consistent with pulmonary edema, cardiomegaly, LLL opacity  Also with right subsegmental PE      PULMONARY FUNCTION TESTS:    EKG:    ASSESSMENT AND RECOMMENDATION    57 F with history of AARON on CPAP - does not know settings, DVT (provoked, 2012) previously on Coumadin, asthma, DM2, and obesity who presents with cough and dyspnea. Etiology of symptoms multifactorial. There is evidence of PE on CT chest. Imaging also consistent with fluid overload with possible pneumonia in LLL. She probably had obesity hypoventilation syndrome and is a chronic retainer (considering that her pCO2 in ABG was in the 80s with a near normal pH / elevated bicarb). It is likely that the patient has little reserve and decompensated given her various acute issues.    Pulmonary edema  -Agree with obtaining a TTE to evaluate for HF   -BNP was not impressively high but may be falsely low in obese patients  -Diurese with IV Lasix    Acute on chronic hypercapneic resp failure in setting of likely AARON/OHS  -Maintain BiPAP at current setting  -Decreased FiO2 to 50% (was on 65% earlier saturating 100%)  -Will reassess in AM    LLL opacity  -Would treat for CAP given symptoms and elevated WBC count  -Can start 5 day course of azithromycin  -Follow up cultures    PE  On Xarelto per primary team    Prasanth Mcnulty MD  Pulmonary and Critical Care Fellow  #[phone]

## 2017-10-20 LAB
ANION GAP SERPL CALC-SCNC: 11 MMOL/L — SIGNIFICANT CHANGE UP (ref 5–17)
BUN SERPL-MCNC: 17 MG/DL — SIGNIFICANT CHANGE UP (ref 7–23)
CALCIUM SERPL-MCNC: 9.1 MG/DL — SIGNIFICANT CHANGE UP (ref 8.4–10.5)
CHLORIDE SERPL-SCNC: 91 MMOL/L — LOW (ref 96–108)
CO2 SERPL-SCNC: 42 MMOL/L — HIGH (ref 22–31)
CREAT SERPL-MCNC: 0.82 MG/DL — SIGNIFICANT CHANGE UP (ref 0.5–1.3)
GLUCOSE SERPL-MCNC: 104 MG/DL — HIGH (ref 70–99)
HCT VFR BLD CALC: 49 % — HIGH (ref 34.5–45)
HGB BLD-MCNC: 13.9 G/DL — SIGNIFICANT CHANGE UP (ref 11.5–15.5)
MAGNESIUM SERPL-MCNC: 2 MG/DL — SIGNIFICANT CHANGE UP (ref 1.6–2.6)
MCHC RBC-ENTMCNC: 23 PG — LOW (ref 27–34)
MCHC RBC-ENTMCNC: 28.4 GM/DL — LOW (ref 32–36)
MCV RBC AUTO: 81 FL — SIGNIFICANT CHANGE UP (ref 80–100)
PHOSPHATE SERPL-MCNC: 4.5 MG/DL — SIGNIFICANT CHANGE UP (ref 2.5–4.5)
PLATELET # BLD AUTO: 230 K/UL — SIGNIFICANT CHANGE UP (ref 150–400)
POTASSIUM SERPL-MCNC: 4.2 MMOL/L — SIGNIFICANT CHANGE UP (ref 3.5–5.3)
POTASSIUM SERPL-SCNC: 4.2 MMOL/L — SIGNIFICANT CHANGE UP (ref 3.5–5.3)
RBC # BLD: 6.05 M/UL — HIGH (ref 3.8–5.2)
RBC # FLD: 20.4 % — HIGH (ref 10.3–14.5)
SODIUM SERPL-SCNC: 144 MMOL/L — SIGNIFICANT CHANGE UP (ref 135–145)
WBC # BLD: 9.6 K/UL — SIGNIFICANT CHANGE UP (ref 3.8–10.5)
WBC # FLD AUTO: 9.6 K/UL — SIGNIFICANT CHANGE UP (ref 3.8–10.5)

## 2017-10-20 PROCEDURE — 99233 SBSQ HOSP IP/OBS HIGH 50: CPT | Mod: GC

## 2017-10-20 PROCEDURE — 93306 TTE W/DOPPLER COMPLETE: CPT | Mod: 26

## 2017-10-20 RX ORDER — FLUOCINONIDE/EMOLLIENT BASE 0.05 %
1 CREAM (GRAM) TOPICAL
Qty: 0 | Refills: 0 | Status: DISCONTINUED | OUTPATIENT
Start: 2017-10-20 | End: 2017-10-25

## 2017-10-20 RX ADMIN — Medication 40 MILLIGRAM(S): at 17:53

## 2017-10-20 RX ADMIN — Medication 40 MILLIGRAM(S): at 05:44

## 2017-10-20 RX ADMIN — Medication 3 MILLILITER(S): at 05:43

## 2017-10-20 RX ADMIN — RIVAROXABAN 15 MILLIGRAM(S): KIT at 18:01

## 2017-10-20 RX ADMIN — Medication 3 MILLILITER(S): at 23:47

## 2017-10-20 RX ADMIN — RIVAROXABAN 15 MILLIGRAM(S): KIT at 05:43

## 2017-10-20 RX ADMIN — Medication 3 MILLILITER(S): at 17:53

## 2017-10-20 RX ADMIN — BUDESONIDE AND FORMOTEROL FUMARATE DIHYDRATE 2 PUFF(S): 160; 4.5 AEROSOL RESPIRATORY (INHALATION) at 17:53

## 2017-10-20 RX ADMIN — Medication 3 MILLILITER(S): at 13:15

## 2017-10-20 RX ADMIN — Medication 1 APPLICATION(S): at 17:53

## 2017-10-20 RX ADMIN — BUDESONIDE AND FORMOTEROL FUMARATE DIHYDRATE 2 PUFF(S): 160; 4.5 AEROSOL RESPIRATORY (INHALATION) at 05:45

## 2017-10-20 RX ADMIN — AZITHROMYCIN 500 MILLIGRAM(S): 500 TABLET, FILM COATED ORAL at 05:43

## 2017-10-20 NOTE — PROGRESS NOTE ADULT - SUBJECTIVE AND OBJECTIVE BOX
Interval Events:  No events overnight  Was on BiPAP since presentation  Feels back to baseline, no dyspnea laying flat  Saturating well on BiPAP 18/8 at FiO2 50%    REVIEW OF SYSTEMS:  Constitutional: [ ] negative [ ] fevers [ ] chills [ ] weight loss [ ] weight gain  HEENT: [ ] negative [ ] dry eyes [ ] eye irritation [ ] postnasal drip [ ] nasal congestion  CV: [ ] negative  [ ] chest pain [ ] orthopnea [ ] palpitations [ ] murmur  Resp: [ ] negative [ ] cough [ ] shortness of breath [ ] dyspnea [ ] wheezing [ ] sputum [ ] hemoptysis  GI: [ ] negative [ ] nausea [ ] vomiting [ ] diarrhea [ ] constipation [ ] abd pain [ ] dysphagia   : [ ] negative [ ] dysuria [ ] nocturia [ ] hematuria [ ] increased urinary frequency  Musculoskeletal: [ ] negative [ ] back pain [ ] myalgias [ ] arthralgias [ ] fracture  Skin: [ ] negative [ ] rash [ ] itch  Neurological: [ ] negative [ ] headache [ ] dizziness [ ] syncope [ ] weakness [ ] numbness  Psychiatric: [ ] negative [ ] anxiety [ ] depression  Endocrine: [ ] negative [ ] diabetes [ ] thyroid problem  Hematologic/Lymphatic: [ ] negative [ ] anemia [ ] bleeding problem  Allergic/Immunologic: [ ] negative [ ] itchy eyes [ ] nasal discharge [ ] hives [ ] angioedema  [x] All other systems negative  [ ] Unable to assess ROS because ________    OBJECTIVE:  ICU Vital Signs Last 24 Hrs  T(C): 36.9 (20 Oct 2017 04:45), Max: 37.2 (19 Oct 2017 20:10)  T(F): 98.5 (20 Oct 2017 04:45), Max: 99 (19 Oct 2017 20:10)  HR: 95 (20 Oct 2017 10:22) (90 - 108)  BP: 142/80 (20 Oct 2017 04:45) (117/63 - 149/80)  BP(mean): --  ABP: --  ABP(mean): --  RR: 18 (20 Oct 2017 04:45) (18 - 22)  SpO2: 89% (20 Oct 2017 10:22) (89% - 98%)        10-20 @ 07:01  -  10-20 @ 10:24  --------------------------------------------------------  IN: 0 mL / OUT: 0 mL / NET: 0 mL      CAPILLARY BLOOD GLUCOSE          PHYSICAL EXAM:  General: Comfortable on BiPAP, Obese  Neck: Unable to assess JVD due to body habitus  Respiratory: CTAB, no wheezing (on BiPAP)  Cardiovascular: RRR, no murmur, nonpitting edema of LE  Abdomen: Soft, nontender  Skin: Intact  Neurological: A&Ox3  Psychiatry: [facility] MEDICATIONS:  rivaroxaban 15 milliGRAM(s) Oral two times a day      furosemide   Injectable 40 milliGRAM(s) IV Push two times a day      ALBUTerol/ipratropium for Nebulization 3 milliLiter(s) Nebulizer every 6 hours  ALBUTerol/ipratropium for Nebulization. 3 milliLiter(s) Nebulizer once  buDESOnide  80 MICROgram(s)/formoterol 4.5 MICROgram(s) Inhaler 2 Puff(s) Inhalation two times a day                      LABS:                        13.9   9.60  )-----------( 230      ( 20 Oct 2017 07:44 )             49.0     Hgb Trend: 13.9<--, 14.1<--, 14.6<--, 15.3<--  10-20    144  |  91<L>  |  17  ----------------------------<  104<H>  4.2   |  42<H>  |  0.82    Ca    9.1      20 Oct 2017 07:55  Phos  4.5     10-20  Mg     2.0     10-20      Creatinine Trend: 0.82<--, 0.87<--, 0.76<--, 0.84<--  PT/INR - ( 18 Oct 2017 16:52 )   PT: 12.4 sec;   INR: 1.13 ratio         PTT - ( 19 Oct 2017 23:16 )  PTT:27.8 sec    Arterial Blood Gas:  10-18 @ 22:22  7.34/85/77/44/94/14.0  ABG lactate: --    Venous Blood Gas:  10-19 @ 06:09  7.28/103/60/47/86  VBG Lactate: 2.0  Venous Blood Gas:  10-19 @ 02:36  7.33/89/86/46/96  VBG Lactate: 1.1  Venous Blood Gas:  10-18 @ 18:52  7.25/107/42/45/61  VBG Lactate: 1.2  Venous Blood Gas:  10-18 @ 16:52  7.28/93/46/42/70  VBG Lactate: 1.5      MICROBIOLOGY:     RADIOLOGY:  [ ] Reviewed and interpreted by me    PULMONARY FUNCTION TESTS:    EKG:    ASSESSMENT AND RECOMMENDATION    57 F with history of AARON on CPAP - does not know settings, DVT (provoked, 2012) previously on Coumadin, asthma, DM2, and obesity who presents with cough and dyspnea. Etiology of symptoms multifactorial. Overall improving on BiPAP, diuresis, and abx therapy.    Pulmonary edema  -Follow up TTE to evaluate for HF   -Diurese with IV Lasix    Acute on chronic hypercapneic resp failure in setting of likely AARON/OHS  -Feels better this morning  -Transitioned to nasal cannula 3L  -Will monitor over the course of the day  -If tolerating NC, can use BiPAP PRN    LLL opacity  -continue azithromycin for 5 days  -Follow up cultures    PE  On Xarelto per primary team    Prasanth Mcnulty MD  Pulmonary and Critical Care Fellow  #[phone] Interval Events:  No events overnight  Was on BiPAP since presentation  Feels back to baseline, no dyspnea laying flat  Saturating well on BiPAP 18/8 at FiO2 50%    REVIEW OF SYSTEMS:  Constitutional: [ ] negative [ ] fevers [ ] chills [ ] weight loss [ ] weight gain  HEENT: [ ] negative [ ] dry eyes [ ] eye irritation [ ] postnasal drip [ ] nasal congestion  CV: [ ] negative  [ ] chest pain [ ] orthopnea [ ] palpitations [ ] murmur  Resp: [ ] negative [ ] cough [ ] shortness of breath [ ] dyspnea [ ] wheezing [ ] sputum [ ] hemoptysis  GI: [ ] negative [ ] nausea [ ] vomiting [ ] diarrhea [ ] constipation [ ] abd pain [ ] dysphagia   : [ ] negative [ ] dysuria [ ] nocturia [ ] hematuria [ ] increased urinary frequency  Musculoskeletal: [ ] negative [ ] back pain [ ] myalgias [ ] arthralgias [ ] fracture  Skin: [ ] negative [ ] rash [ ] itch  Neurological: [ ] negative [ ] headache [ ] dizziness [ ] syncope [ ] weakness [ ] numbness  Psychiatric: [ ] negative [ ] anxiety [ ] depression  Endocrine: [ ] negative [ ] diabetes [ ] thyroid problem  Hematologic/Lymphatic: [ ] negative [ ] anemia [ ] bleeding problem  Allergic/Immunologic: [ ] negative [ ] itchy eyes [ ] nasal discharge [ ] hives [ ] angioedema  [x] All other systems negative  [ ] Unable to assess ROS because ________    OBJECTIVE:  ICU Vital Signs Last 24 Hrs  T(C): 36.9 (20 Oct 2017 04:45), Max: 37.2 (19 Oct 2017 20:10)  T(F): 98.5 (20 Oct 2017 04:45), Max: 99 (19 Oct 2017 20:10)  HR: 95 (20 Oct 2017 10:22) (90 - 108)  BP: 142/80 (20 Oct 2017 04:45) (117/63 - 149/80)  BP(mean): --  ABP: --  ABP(mean): --  RR: 18 (20 Oct 2017 04:45) (18 - 22)  SpO2: 89% (20 Oct 2017 10:22) (89% - 98%)        10-20 @ 07:01  -  10-20 @ 10:24  --------------------------------------------------------  IN: 0 mL / OUT: 0 mL / NET: 0 mL      CAPILLARY BLOOD GLUCOSE          PHYSICAL EXAM:  General: Comfortable on BiPAP, Obese  Neck: Unable to assess JVD due to body habitus  Respiratory: CTAB, no wheezing (on BiPAP)  Cardiovascular: RRR, no murmur, nonpitting edema of LE  Abdomen: Soft, nontender  Skin: Intact  Neurological: A&Ox3  Psychiatry: [facility] MEDICATIONS:  rivaroxaban 15 milliGRAM(s) Oral two times a day      furosemide   Injectable 40 milliGRAM(s) IV Push two times a day      ALBUTerol/ipratropium for Nebulization 3 milliLiter(s) Nebulizer every 6 hours  ALBUTerol/ipratropium for Nebulization. 3 milliLiter(s) Nebulizer once  buDESOnide  80 MICROgram(s)/formoterol 4.5 MICROgram(s) Inhaler 2 Puff(s) Inhalation two times a day                      LABS:                        13.9   9.60  )-----------( 230      ( 20 Oct 2017 07:44 )             49.0     Hgb Trend: 13.9<--, 14.1<--, 14.6<--, 15.3<--  10-20    144  |  91<L>  |  17  ----------------------------<  104<H>  4.2   |  42<H>  |  0.82    Ca    9.1      20 Oct 2017 07:55  Phos  4.5     10-20  Mg     2.0     10-20      Creatinine Trend: 0.82<--, 0.87<--, 0.76<--, 0.84<--  PT/INR - ( 18 Oct 2017 16:52 )   PT: 12.4 sec;   INR: 1.13 ratio         PTT - ( 19 Oct 2017 23:16 )  PTT:27.8 sec    Arterial Blood Gas:  10-18 @ 22:22  7.34/85/77/44/94/14.0  ABG lactate: --    Venous Blood Gas:  10-19 @ 06:09  7.28/103/60/47/86  VBG Lactate: 2.0  Venous Blood Gas:  10-19 @ 02:36  7.33/89/86/46/96  VBG Lactate: 1.1  Venous Blood Gas:  10-18 @ 18:52  7.25/107/42/45/61  VBG Lactate: 1.2  Venous Blood Gas:  10-18 @ 16:52  7.28/93/46/42/70  VBG Lactate: 1.5      MICROBIOLOGY:     RADIOLOGY:  [ ] Reviewed and interpreted by me    PULMONARY FUNCTION TESTS:    EKG:    ASSESSMENT AND RECOMMENDATION    57 F with history of AARON on CPAP - does not know settings, DVT (provoked, 2012) previously on Coumadin, asthma, DM2, and obesity who presents with cough and dyspnea. Etiology of symptoms multifactorial. Overall improving on BiPAP, diuresis, and abx therapy.    Pulmonary edema  -Follow up TTE to evaluate for HF   -Diurese with IV Lasix    Acute on chronic hypercapneic resp failure in setting of likely AARON/OHS  -Feels better this morning  -Transitioned to nasal cannula 3L  -Will monitor over the course of the day  -If tolerating NC, can use BiPAP PRN and at night    LLL opacity  -continue azithromycin for 5 days  -Follow up cultures    PE  On Xarelto per primary team    Prasanth Mcnulty MD  Pulmonary and Critical Care Fellow  #[phone]

## 2017-10-20 NOTE — PROGRESS NOTE ADULT - PROBLEM SELECTOR PLAN 1
-Suspect hypoxia 2/2 to pulmonary edema from ?new onset heart failure and hypercarbia from non-compliance with CPAP at home vs. asthma exacerbation vs CAP  - Elevated bicarb on BMP, pt is likely chronic pCO2 retainer  - improvement on BiPAP yesterday, saturating 93-95 on NC 4L this AM  -c/w diuresis w/ lasix 40mg IV BID  -on xarelto for PE as this is 2nd provoked event and needs lifelong AC  -Pulm consult - will do 5 day course of azithro  - f/u TTE today w/ bubble to assess cardiac function and to r/o intracardiac shunt -Suspect hypoxia 2/2 to pulmonary edema from ?new onset heart failure and hypercarbia from non-compliance with CPAP at home vs. asthma exacerbation vs CAP  - Elevated bicarb on BMP, pt is likely chronic pCO2 retainer  - improvement on BiPAP yesterday, saturating 93-95 on NC 4L this AM  -c/w diuresis w/ lasix 40mg IV BID  -on xarelto for PE as this is 2nd provoked event and needs lifelong AC  -Pulm consult - will do 5 day course of azithro  - TTE today - some RV systolic function, EF 55-60%, no PFO

## 2017-10-20 NOTE — PROGRESS NOTE ADULT - PROBLEM SELECTOR PLAN 3
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

## 2017-10-20 NOTE — PROGRESS NOTE ADULT - SUBJECTIVE AND OBJECTIVE BOX
Patient is a 57y old  Female who presents with a chief complaint of SOB, cough (18 Oct 2017 22:35)      SUBJECTIVE / OVERNIGHT EVENTS: No acute events overnight. This morning switched from bipap to NC on 4.5L with saturation in the 90s. States she feels much better. Denies fever, chills, HA, dizziness, CP, SOB, palpitations, abdominal pain, N/V.    MEDICATIONS  (STANDING):  ALBUTerol/ipratropium for Nebulization 3 milliLiter(s) Nebulizer every 6 hours  ALBUTerol/ipratropium for Nebulization. 3 milliLiter(s) Nebulizer once  buDESOnide  80 MICROgram(s)/formoterol 4.5 MICROgram(s) Inhaler 2 Puff(s) Inhalation two times a day  fluocinonide 0.05% Cream 1 Application(s) Topical two times a day  furosemide   Injectable 40 milliGRAM(s) IV Push two times a day  rivaroxaban 15 milliGRAM(s) Oral two times a day    MEDICATIONS  (PRN):        CAPILLARY BLOOD GLUCOSE        I&O's Summary    20 Oct 2017 07:01  -  20 Oct 2017 11:24  --------------------------------------------------------  IN: 0 mL / OUT: 0 mL / NET: 0 mL        PHYSICAL EXAM:  GENERAL : Obese, alert and cooperative on BiPAP. NAD.   HEAD: NC/AT  EYES: clear conjunctiva, EOMI  NECK: Neck supple, non-tender without lymphadenopathy, masses, No JVD, acanthosis nigricans noted  HEART: Normal S1 and S2. Tachy, No S3 or splitting  LUNGS: Decreased breath sounds bilaterally, no wheezing, few crackles at bases  ABDOMEN: Soft, nondistended, tender to palpation of ventral hernia. No guarding or rebound.   MUSKULOSKELETAL: No joint erythema or tenderness.   EXTREMITIES: 2-3+ b/l pitting LE edema, L worse than R, pulses cannot be appreciated due to edema and body habitus  NEUROLOGICAL: No focal neurologic deficit. Strength and sensation symmetric and intact throughout.   SKIN: Skin clean, dry, intact  PSYCHIATRIC: AOx3.Normal affect and behavior.    LABS:  (10-20 @ 07:44)                        13.9  9.60 )-----------( 230                 49.0    Neutrophils = -- (--%)  Lymphocytes = -- (--%)  Eosinophils = -- (--%)  Basophils = -- (--%)  Monocytes = -- (--%)  Bands = --%    WBC Trend: 9.60<--, 13.22<--, 15.0<--  Hb Trend: 13.9<--, 14.1<--, 14.6<--, 15.3<--  Plt Trend: 230<--, 224<--, 229<--, 253<--  10-20    144  |  91<L>  |  17  ----------------------------<  104<H>  4.2   |  42<H>  |  0.82    Ca    9.1      20 Oct 2017 07:55  Phos  4.5     10-20  Mg     2.0     10-20      Creatinine Trend: 0.82<--, 0.87<--, 0.76<--, 0.84<--  ( 18 Oct 2017 16:52 )   PT: 12.4 sec;   INR: 1.13 ratio;       PTT:27.8 sec  CARDIAC MARKERS ( 19 Oct 2017 00:59 )  Trop <0.01 ng/mL /  U/L / CKMB x       CARDIAC MARKERS ( 18 Oct 2017 16:52 )  Trop <0.01 ng/mL / CK x     / CKMB x               Microbiology:  Urine Cx:  Blood Cx:    RADIOLOGY & ADDITIONAL TESTS:  X- Ray:  CT:  Ultrasound:  [ ] imaging personally reviewed and interpreted by me    Consultant(s) Notes Reviewed:      Care Discussed with Consultants/Other Providers: Patient is a 57y old  Female who presents with a chief complaint of SOB, cough (18 Oct 2017 22:35)      SUBJECTIVE / OVERNIGHT EVENTS: No acute events overnight. This morning switched from bipap to NC on 4.5L with saturation in the 90s. States she feels much better. Denies fever, chills, HA, dizziness, CP, SOB, palpitations, abdominal pain, N/V.    MEDICATIONS  (STANDING):  ALBUTerol/ipratropium for Nebulization 3 milliLiter(s) Nebulizer every 6 hours  ALBUTerol/ipratropium for Nebulization. 3 milliLiter(s) Nebulizer once  buDESOnide  80 MICROgram(s)/formoterol 4.5 MICROgram(s) Inhaler 2 Puff(s) Inhalation two times a day  fluocinonide 0.05% Cream 1 Application(s) Topical two times a day  furosemide   Injectable 40 milliGRAM(s) IV Push two times a day  rivaroxaban 15 milliGRAM(s) Oral two times a day    MEDICATIONS  (PRN):        CAPILLARY BLOOD GLUCOSE        I&O's Summary    20 Oct 2017 07:01  -  20 Oct 2017 11:24  --------------------------------------------------------  IN: 0 mL / OUT: 0 mL / NET: 0 mL        PHYSICAL EXAM:  GENERAL : Obese, alert and cooperative on NC. NAD.   HEAD: NC/AT  EYES: clear conjunctiva, EOMI  NECK: Neck supple, non-tender without lymphadenopathy, masses, No JVD, acanthosis nigricans noted  HEART: Normal S1 and S2. Tachy, No S3 or splitting  LUNGS: Decreased breath sounds bilaterally, no wheezing, few crackles at bases  ABDOMEN: Soft, nondistended, tender to palpation of ventral hernia. No guarding or rebound.   MUSKULOSKELETAL: No joint erythema or tenderness.   EXTREMITIES: 2-3+ b/l pitting LE edema, L worse than R, pulses cannot be appreciated due to edema and body habitus  NEUROLOGICAL: No focal neurologic deficit. Strength and sensation symmetric and intact throughout.   SKIN: Skin clean, dry, intact  PSYCHIATRIC: AOx3.Normal affect and behavior.    LABS:  (10-20 @ 07:44)                        13.9  9.60 )-----------( 230                 49.0    Neutrophils = -- (--%)  Lymphocytes = -- (--%)  Eosinophils = -- (--%)  Basophils = -- (--%)  Monocytes = -- (--%)  Bands = --%    WBC Trend: 9.60<--, 13.22<--, 15.0<--  Hb Trend: 13.9<--, 14.1<--, 14.6<--, 15.3<--  Plt Trend: 230<--, 224<--, 229<--, 253<--  10-20    144  |  91<L>  |  17  ----------------------------<  104<H>  4.2   |  42<H>  |  0.82    Ca    9.1      20 Oct 2017 07:55  Phos  4.5     10-20  Mg     2.0     10-20      Creatinine Trend: 0.82<--, 0.87<--, 0.76<--, 0.84<--  ( 18 Oct 2017 16:52 )   PT: 12.4 sec;   INR: 1.13 ratio;       PTT:27.8 sec  CARDIAC MARKERS ( 19 Oct 2017 00:59 )  Trop <0.01 ng/mL /  U/L / CKMB x       CARDIAC MARKERS ( 18 Oct 2017 16:52 )  Trop <0.01 ng/mL / CK x     / CKMB x               Microbiology:  Urine Cx:  Blood Cx:    RADIOLOGY & ADDITIONAL TESTS:  X- Ray:  CT:  Ultrasound:  [ ] imaging personally reviewed and interpreted by me    Consultant(s) Notes Reviewed:      Care Discussed with Consultants/Other Providers: Patient is a 57y old  Female who presents with a chief complaint of SOB, cough (18 Oct 2017 22:35)      SUBJECTIVE / OVERNIGHT EVENTS: No acute events overnight. This morning switched from bipap to NC on 4.5L with saturation in the 90s. States she feels much better. Denies fever, chills, HA, dizziness, CP, SOB, palpitations, abdominal pain, N/V.    MEDICATIONS  (STANDING):  ALBUTerol/ipratropium for Nebulization 3 milliLiter(s) Nebulizer every 6 hours  ALBUTerol/ipratropium for Nebulization. 3 milliLiter(s) Nebulizer once  buDESOnide  80 MICROgram(s)/formoterol 4.5 MICROgram(s) Inhaler 2 Puff(s) Inhalation two times a day  fluocinonide 0.05% Cream 1 Application(s) Topical two times a day  furosemide   Injectable 40 milliGRAM(s) IV Push two times a day  rivaroxaban 15 milliGRAM(s) Oral two times a day    MEDICATIONS  (PRN):        CAPILLARY BLOOD GLUCOSE        I&O's Summary    20 Oct 2017 07:01  -  20 Oct 2017 11:24  --------------------------------------------------------  IN: 0 mL / OUT: 0 mL / NET: 0 mL        PHYSICAL EXAM:  GENERAL : Obese, alert and cooperative on NC. NAD.   HEAD: NC/AT  EYES: clear conjunctiva, EOMI  NECK: Neck supple, non-tender without lymphadenopathy, masses, No JVD, acanthosis nigricans noted  HEART: Normal S1 and S2. Tachy, No S3 or splitting  LUNGS: Decreased breath sounds bilaterally, no wheezing, few crackles at bases  ABDOMEN: Soft, nondistended, tender to palpation of ventral hernia. No guarding or rebound.   MUSKULOSKELETAL: No joint erythema or tenderness.   EXTREMITIES: 2-3+ b/l pitting LE edema, L worse than R, pulses cannot be appreciated due to edema and body habitus  NEUROLOGICAL: No focal neurologic deficit. Strength and sensation symmetric and intact throughout.   SKIN: Skin clean, dry, intact  PSYCHIATRIC: AOx3.Normal affect and behavior.    LABS:  (10-20 @ 07:44)                        13.9  9.60 )-----------( 230                 49.0    Neutrophils = -- (--%)  Lymphocytes = -- (--%)  Eosinophils = -- (--%)  Basophils = -- (--%)  Monocytes = -- (--%)  Bands = --%    WBC Trend: 9.60<--, 13.22<--, 15.0<--  Hb Trend: 13.9<--, 14.1<--, 14.6<--, 15.3<--  Plt Trend: 230<--, 224<--, 229<--, 253<--  10-20    144  |  91<L>  |  17  ----------------------------<  104<H>  4.2   |  42<H>  |  0.82    Ca    9.1      20 Oct 2017 07:55  Phos  4.5     10-20  Mg     2.0     10-20      Creatinine Trend: 0.82<--, 0.87<--, 0.76<--, 0.84<--  ( 18 Oct 2017 16:52 )   PT: 12.4 sec;   INR: 1.13 ratio;       PTT:27.8 sec  CARDIAC MARKERS ( 19 Oct 2017 00:59 )  Trop <0.01 ng/mL /  U/L / CKMB x       CARDIAC MARKERS ( 18 Oct 2017 16:52 )  Trop <0.01 ng/mL / CK x     / CKMB x               Microbiology:  Urine Cx:  Blood Cx:    RADIOLOGY & ADDITIONAL TESTS:  X- Ray:  CT:  Ultrasound:  [ ] imaging personally reviewed and interpreted by me    Consultant(s) Notes Reviewed:      Care Discussed with Consultants/Other Providers: pulmonary

## 2017-10-20 NOTE — PROGRESS NOTE ADULT - PROBLEM SELECTOR PLAN 4
-Possible exacerbation contributing to her symptoms, significantly improved with bipap and lasix therefore more likely due to pulm edema, afebrile, no fevers or chills at home, small amount of white sputum production at home  - RVP negative  - c/w azithro 5 day course for CAP  - will c/w duonebs q6 for now  - f/u blood cultures

## 2017-10-21 LAB
ANION GAP SERPL CALC-SCNC: 12 MMOL/L — SIGNIFICANT CHANGE UP (ref 5–17)
BUN SERPL-MCNC: 19 MG/DL — SIGNIFICANT CHANGE UP (ref 7–23)
CALCIUM SERPL-MCNC: 9.1 MG/DL — SIGNIFICANT CHANGE UP (ref 8.4–10.5)
CHLORIDE SERPL-SCNC: 91 MMOL/L — LOW (ref 96–108)
CO2 SERPL-SCNC: 41 MMOL/L — HIGH (ref 22–31)
CREAT SERPL-MCNC: 0.76 MG/DL — SIGNIFICANT CHANGE UP (ref 0.5–1.3)
GLUCOSE SERPL-MCNC: 103 MG/DL — HIGH (ref 70–99)
HCT VFR BLD CALC: 46.9 % — HIGH (ref 34.5–45)
HGB BLD-MCNC: 13.2 G/DL — SIGNIFICANT CHANGE UP (ref 11.5–15.5)
MCHC RBC-ENTMCNC: 22.8 PG — LOW (ref 27–34)
MCHC RBC-ENTMCNC: 28.1 GM/DL — LOW (ref 32–36)
MCV RBC AUTO: 80.9 FL — SIGNIFICANT CHANGE UP (ref 80–100)
PLATELET # BLD AUTO: 241 K/UL — SIGNIFICANT CHANGE UP (ref 150–400)
POTASSIUM SERPL-MCNC: 4 MMOL/L — SIGNIFICANT CHANGE UP (ref 3.5–5.3)
POTASSIUM SERPL-SCNC: 4 MMOL/L — SIGNIFICANT CHANGE UP (ref 3.5–5.3)
RBC # BLD: 5.8 M/UL — HIGH (ref 3.8–5.2)
RBC # FLD: 20.7 % — HIGH (ref 10.3–14.5)
SODIUM SERPL-SCNC: 144 MMOL/L — SIGNIFICANT CHANGE UP (ref 135–145)
WBC # BLD: 10.39 K/UL — SIGNIFICANT CHANGE UP (ref 3.8–10.5)
WBC # FLD AUTO: 10.39 K/UL — SIGNIFICANT CHANGE UP (ref 3.8–10.5)

## 2017-10-21 PROCEDURE — 99233 SBSQ HOSP IP/OBS HIGH 50: CPT | Mod: GC

## 2017-10-21 RX ADMIN — Medication 40 MILLIGRAM(S): at 05:52

## 2017-10-21 RX ADMIN — RIVAROXABAN 15 MILLIGRAM(S): KIT at 05:52

## 2017-10-21 RX ADMIN — Medication 40 MILLIGRAM(S): at 17:59

## 2017-10-21 RX ADMIN — Medication 1 APPLICATION(S): at 18:00

## 2017-10-21 RX ADMIN — AZITHROMYCIN 250 MILLIGRAM(S): 500 TABLET, FILM COATED ORAL at 11:20

## 2017-10-21 RX ADMIN — Medication 1 APPLICATION(S): at 05:51

## 2017-10-21 RX ADMIN — Medication 3 MILLILITER(S): at 05:51

## 2017-10-21 RX ADMIN — BUDESONIDE AND FORMOTEROL FUMARATE DIHYDRATE 2 PUFF(S): 160; 4.5 AEROSOL RESPIRATORY (INHALATION) at 17:59

## 2017-10-21 RX ADMIN — Medication 3 MILLILITER(S): at 17:59

## 2017-10-21 RX ADMIN — RIVAROXABAN 15 MILLIGRAM(S): KIT at 17:59

## 2017-10-21 RX ADMIN — Medication 3 MILLILITER(S): at 23:18

## 2017-10-21 RX ADMIN — Medication 3 MILLILITER(S): at 11:19

## 2017-10-21 RX ADMIN — BUDESONIDE AND FORMOTEROL FUMARATE DIHYDRATE 2 PUFF(S): 160; 4.5 AEROSOL RESPIRATORY (INHALATION) at 05:51

## 2017-10-21 NOTE — PROGRESS NOTE ADULT - SUBJECTIVE AND OBJECTIVE BOX
Patient is a 57y old  Female who presents with a chief complaint of SOB, cough (18 Oct 2017 22:35)      SUBJECTIVE / OVERNIGHT EVENTS: No acute events overnight. Seen this morning on NC 5L tolerating well and eating breakfast. No complaints of HA, lightheadedness, SOB, CP, palpitations, abdominal pain, N/V.     MEDICATIONS  (STANDING):  ALBUTerol/ipratropium for Nebulization 3 milliLiter(s) Nebulizer every 6 hours  ALBUTerol/ipratropium for Nebulization. 3 milliLiter(s) Nebulizer once  azithromycin   Tablet 250 milliGRAM(s) Oral daily  buDESOnide  80 MICROgram(s)/formoterol 4.5 MICROgram(s) Inhaler 2 Puff(s) Inhalation two times a day  fluocinonide 0.05% Cream 1 Application(s) Topical two times a day  furosemide   Injectable 40 milliGRAM(s) IV Push two times a day  rivaroxaban 15 milliGRAM(s) Oral two times a day    MEDICATIONS  (PRN):        CAPILLARY BLOOD GLUCOSE        I&O's Summary    20 Oct 2017 07:01  -  21 Oct 2017 07:00  --------------------------------------------------------  IN: 480 mL / OUT: 1200 mL / NET: -720 mL        PHYSICAL EXAM:  GENERAL : Obese, alert and cooperative on NC. NAD.   HEAD: NC/AT  EYES: clear conjunctiva, EOMI  NECK: Neck supple, non-tender without lymphadenopathy, masses, No JVD, acanthosis nigricans noted  HEART: Normal S1 and S2. regular rate. No S3 or splitting  LUNGS: Decreased breath sounds bilaterally, no wheezing, very few crackles at bases  ABDOMEN: Soft, nondistended, tender to palpation of ventral hernia. No guarding or rebound.   MUSKULOSKELETAL: No joint erythema or tenderness.   EXTREMITIES: 2-3+ b/l pitting LE edema, L worse than R, pulses cannot be appreciated due to edema and body habitus  NEUROLOGICAL: No focal neurologic deficit. Strength and sensation symmetric and intact throughout.   SKIN: Skin clean, dry, intact  PSYCHIATRIC: AOx3.Normal affect and behavior.    LABS:    WBC Trend: 9.60<--, 13.22<--, 15.0<--  Hb Trend: 13.9<--, 14.1<--, 14.6<--, 15.3<--  Plt Trend: 230<--, 224<--, 229<--, 253<--  10-20    144  |  91<L>  |  17  ----------------------------<  104<H>  4.2   |  42<H>  |  0.82    Ca    9.1      20 Oct 2017 07:55  Phos  4.5     10-20  Mg     2.0     10-20      Creatinine Trend: 0.82<--, 0.87<--, 0.76<--, 0.84<--  PTT:27.8 sec          Microbiology:  Urine Cx:  Blood Cx:    RADIOLOGY & ADDITIONAL TESTS:  X- Ray:  CT:  Ultrasound:  [ ] imaging personally reviewed and interpreted by me    Consultant(s) Notes Reviewed:      Care Discussed with Consultants/Other Providers:

## 2017-10-21 NOTE — DIETITIAN INITIAL EVALUATION ADULT. - PROBLEM SELECTOR PLAN 4
-Possibility there is a component of asthma exacerbation contributing to her symptoms as her dyspnea is likely multifactorial, start empiric prednisone 40 x 5 days.  Start Levaquin x 5 days and follow above infectious workup; if pt appears better can likely d/c abx if clinically stabilizs.  DuoNebs atc q6h, f/u MICU recs.

## 2017-10-21 NOTE — PHYSICAL THERAPY INITIAL EVALUATION ADULT - PERTINENT HX OF CURRENT PROBLEM, REHAB EVAL
Pt. 57 year old female admitted 10- with shortness of breath, went to PCP and found to be hypoxic with Ox sats in 70's. Pt. was sent to ER, CT chest PE right middle lobe

## 2017-10-21 NOTE — PROGRESS NOTE ADULT - PROBLEM SELECTOR PLAN 4
-Possible exacerbation contributing to her symptoms, significantly improved with bipap and lasix therefore more likely due to pulm edema, afebrile, no fevers or chills at home, small amount of white sputum production at home  - RVP negative  - c/w azithro 5 day course for CAP  - will c/w duonebs q6 for now  - blood cultures NGTD  - will f/u with CM as patient needs new CPAP machine at home

## 2017-10-21 NOTE — DIETITIAN INITIAL EVALUATION ADULT. - PROBLEM SELECTOR PLAN 3
-unclear exact etiology; R heart failure/pHTN from chronic HF can account for peripheral edema but I suspect pt has underlying systolic dysfunction given hx of prior cardiac medications, fluid overload, and pt being referred to cardiologist.  Pt is unaware of any CHF dx and unaware of any recent TTE.  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   -c/w lasix 40 iv bid (pt has been on lasix in the past, and also on other diuretics, so not naive); pt given large dose x 1 (120 mg) in ED will c/w 40 IV bid and reassess response.   -strict i/o, daily weights  -low salt diet when able to tolerate some time off bipap

## 2017-10-21 NOTE — PROGRESS NOTE ADULT - PROBLEM SELECTOR PLAN 3
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

## 2017-10-21 NOTE — PROGRESS NOTE ADULT - PROBLEM SELECTOR PLAN 1
-Suspect hypoxia 2/2 to pulmonary edema and PE and hypercarbia from non-compliance with CPAP at home vs. asthma exacerbation vs CAP  - Elevated bicarb on BMP, pt is likely chronic pCO2 retainer  - improvement on BiPAP yesterday, saturating 93-95 on NC 5L this AM  - can change to lasix PO today  -on xarelto for PE as this is 2nd provoked event and needs lifelong AC  -Pulm consult - will do 5 day course of azithro  - TTE today - some RV systolic function, EF 55-60%, no PFO, presentation likely not 2/2 HF -Suspect hypoxia 2/2 to pulmonary edema and PE and hypercarbia from non-compliance with CPAP at home vs. 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

## 2017-10-21 NOTE — DIETITIAN INITIAL EVALUATION ADULT. - NS AS NUTRI INTERV MEALS SNACK
Recommend to continue DASH/Consistent CHO diet at this time; will discuss DM history with team./General/healthful diet

## 2017-10-21 NOTE — DIETITIAN INITIAL EVALUATION ADULT. - ADHERENCE
Pt states she follow a no add Na diet and reads food labels while shopping to choose low Na options./good

## 2017-10-21 NOTE — PROGRESS NOTE ADULT - PROBLEM SELECTOR PLAN 5
- will restart metoprolol today and amlodipine as tolerated  - will switch to PO lasix - will restart metoprolol today and amlodipine as tolerated  - c/w lasix BID - can restart home metoprolol tomorrow, amlodipine as tolerated  - c/w lasix BID

## 2017-10-21 NOTE — DIETITIAN INITIAL EVALUATION ADULT. - ORAL INTAKE PTA
Pt reports a good PO intake PTA. States she preforms the food shopping and cooking and consumes 3 meals daily. Pt states her meals vary but all consist of protein, vegetable and starch/good

## 2017-10-21 NOTE — DIETITIAN INITIAL EVALUATION ADULT. - PROBLEM SELECTOR PLAN 2
-c/w hep gtt as above  -BP stable although pt w/ increased O2 requirements. 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 doac.  -send infectious workup though at this time infection does not seem to be primary driving force behind multifactorial hypoxic/hypercarbic resp failure: check rvp, blood cultures, sputum cultures, legionella ag.

## 2017-10-21 NOTE — PHYSICAL THERAPY INITIAL EVALUATION ADULT - DISCHARGE DISPOSITION, PT EVAL
home w/ home PT/Home PT for home safety assessment with new assistive device and for progressive ambulation and endurance training

## 2017-10-21 NOTE — DIETITIAN INITIAL EVALUATION ADULT. - NS AS NUTRI INTERV COLLABORAT
Malnutrition sticker for BMI >40 placed in chart, team aware. Recommend checking Hgba1c. RD remains available to monitor PO intake, wt, labs and diet education review./Collaboration with other providers

## 2017-10-21 NOTE — DIETITIAN INITIAL EVALUATION ADULT. - PROBLEM SELECTOR PLAN 1
-Suspect hypoxia 2/2 to PE and pulmonary edema from ?new onset heart failure and hypercarbia from non-compliance with CPAP at home vs. asthma exacerbation. Elevated bicarb on BMP indicates pt is likely chronic pCO2 retainer. Given pH 7.35 with PCO2 80s suspect her baseline with recent bipap nonadherence is ~PCO2 70-80.  Pt w/improvement on BiPAP w/ increased O2 requirements, settings changed to 18/8 and MICU consulted. Acidosis within normal range, baseline pCO2 may be near 70s. Will repeat blood gas on new settings  -c/w diuresis w/ lasix 40mg IV BID, c/w hep gtt for PE  -MICU recs appreciated, c/w prednisone  -Pt afebrile w/ no consolidations on CT, unlikely to be infectious etiology.   -Obtain TTE w/ bubble to assess cardiac function and to r/o intracardiac shunt, cardiomegaly on CT. Hold BB for now.

## 2017-10-21 NOTE — DIETITIAN INITIAL EVALUATION ADULT. - ENERGY NEEDS
Ht: 5'0", Wt: 265.2lbs, BMI:51.7 kg/m2, IBW: 100lbs(+/-10%), 265%IBW  Pertinent information:  Pt admitted for 2 weeks and SOB. Pt with HTN, cOPD. per chart, Pt found with acute on chronic respiratory failure with hypercapnia and hypoxia, acute pulmonary edema and pulmonary embolism   +3 linda ankle/foot Edema, Skin intact

## 2017-10-21 NOTE — CHART NOTE - NSCHARTNOTEFT_GEN_A_CORE
Upon Nutritional Assessment by the Registered Dietitian your patient was determined to meet criteria / has evidence of the following diagnosis/diagnoses:          [ ]  Mild Protein Calorie Malnutrition        [ ]  Moderate Protein Calorie Malnutrition        [ ] Severe Protein Calorie Malnutrition        [ ] Unspecified Protein Calorie Malnutrition        [ ] Underweight / BMI <19        [ X] Morbid Obesity / BMI > 40      Findings as based on:  [X ] Comprehensive nutrition assessment; BMI: 51.7kg/m2   [ ] Nutrition Focused Physical Exam  [X ] Other:       Nutrition Plan/Recommendations:      educated Pt regarding therapeutic diet, and portion control  Please check Hgba1c as DM history is note clear      PROVIDER Section:     By signing this assessment you are acknowledging and agree with the diagnosis/diagnoses assigned by the Registered Dietitian    Comments:

## 2017-10-21 NOTE — DIETITIAN INITIAL EVALUATION ADULT. - OTHER INFO
Nutrition consult received for BMI >40. Pt reports a good PO intake inhouse. Noted Pt on Consistent CHO diet and in MICU note Pt with T2DM  Pt denies history of DM' will discuss with team. Pt c/o a 10lb Wt gain due to decreased physical activity however noted Pt to be 265lbs. Briefly reviewed tips fro portion control, discussed DASH diet education. Written materials provided. Pt c/o diarrhea, states last BM was today and soft. Pt denies chewing/swallowing difficulty. Pt denies micronutrient supplementation. Noted allergy to Kiwi and green peppers.

## 2017-10-21 NOTE — DIETITIAN INITIAL EVALUATION ADULT. - NS AS NUTRI INTERV ED CONTENT
Discussed DASH diet education. Reviewed foods high in Na and cholesterol to avoid. Reviewed ways to decrease Na in your diet, discussed meal and snack options, tips for eating out; provided written Heart Healthy Eating Nutrition Therapy handout to Pt. reviewed tips fro weight loss and portions control/Nutrition relationship to health/disease/Other (specify)/Purpose of the nutrition education/Survival information

## 2017-10-22 ENCOUNTER — TRANSCRIPTION ENCOUNTER (OUTPATIENT)
Age: 57
End: 2017-10-22

## 2017-10-22 LAB
ANION GAP SERPL CALC-SCNC: 11 MMOL/L — SIGNIFICANT CHANGE UP (ref 5–17)
BUN SERPL-MCNC: 18 MG/DL — SIGNIFICANT CHANGE UP (ref 7–23)
CALCIUM SERPL-MCNC: 9.4 MG/DL — SIGNIFICANT CHANGE UP (ref 8.4–10.5)
CHLORIDE SERPL-SCNC: 90 MMOL/L — LOW (ref 96–108)
CO2 SERPL-SCNC: 41 MMOL/L — HIGH (ref 22–31)
CREAT SERPL-MCNC: 0.75 MG/DL — SIGNIFICANT CHANGE UP (ref 0.5–1.3)
GLUCOSE SERPL-MCNC: 126 MG/DL — HIGH (ref 70–99)
HCT VFR BLD CALC: 47.8 % — HIGH (ref 34.5–45)
HGB BLD-MCNC: 13.7 G/DL — SIGNIFICANT CHANGE UP (ref 11.5–15.5)
MCHC RBC-ENTMCNC: 22.9 PG — LOW (ref 27–34)
MCHC RBC-ENTMCNC: 28.7 GM/DL — LOW (ref 32–36)
MCV RBC AUTO: 80.1 FL — SIGNIFICANT CHANGE UP (ref 80–100)
PLATELET # BLD AUTO: 259 K/UL — SIGNIFICANT CHANGE UP (ref 150–400)
POTASSIUM SERPL-MCNC: 3.5 MMOL/L — SIGNIFICANT CHANGE UP (ref 3.5–5.3)
POTASSIUM SERPL-SCNC: 3.5 MMOL/L — SIGNIFICANT CHANGE UP (ref 3.5–5.3)
RBC # BLD: 5.97 M/UL — HIGH (ref 3.8–5.2)
RBC # FLD: 20.2 % — HIGH (ref 10.3–14.5)
SODIUM SERPL-SCNC: 142 MMOL/L — SIGNIFICANT CHANGE UP (ref 135–145)
WBC # BLD: 9.8 K/UL — SIGNIFICANT CHANGE UP (ref 3.8–10.5)
WBC # FLD AUTO: 9.8 K/UL — SIGNIFICANT CHANGE UP (ref 3.8–10.5)

## 2017-10-22 PROCEDURE — 99233 SBSQ HOSP IP/OBS HIGH 50: CPT | Mod: GC

## 2017-10-22 RX ORDER — PETROLATUM,WHITE
1 JELLY (GRAM) TOPICAL DAILY
Qty: 0 | Refills: 0 | Status: DISCONTINUED | OUTPATIENT
Start: 2017-10-22 | End: 2017-10-25

## 2017-10-22 RX ADMIN — Medication 3 MILLILITER(S): at 11:09

## 2017-10-22 RX ADMIN — BUDESONIDE AND FORMOTEROL FUMARATE DIHYDRATE 2 PUFF(S): 160; 4.5 AEROSOL RESPIRATORY (INHALATION) at 17:24

## 2017-10-22 RX ADMIN — RIVAROXABAN 15 MILLIGRAM(S): KIT at 06:08

## 2017-10-22 RX ADMIN — Medication 1 APPLICATION(S): at 06:15

## 2017-10-22 RX ADMIN — BUDESONIDE AND FORMOTEROL FUMARATE DIHYDRATE 2 PUFF(S): 160; 4.5 AEROSOL RESPIRATORY (INHALATION) at 06:08

## 2017-10-22 RX ADMIN — Medication 3 MILLILITER(S): at 06:08

## 2017-10-22 RX ADMIN — Medication 40 MILLIGRAM(S): at 17:23

## 2017-10-22 RX ADMIN — Medication 3 MILLILITER(S): at 23:14

## 2017-10-22 RX ADMIN — AZITHROMYCIN 250 MILLIGRAM(S): 500 TABLET, FILM COATED ORAL at 11:01

## 2017-10-22 RX ADMIN — Medication 3 MILLILITER(S): at 17:24

## 2017-10-22 RX ADMIN — Medication 1 APPLICATION(S): at 17:26

## 2017-10-22 RX ADMIN — Medication 40 MILLIGRAM(S): at 06:08

## 2017-10-22 RX ADMIN — RIVAROXABAN 15 MILLIGRAM(S): KIT at 17:26

## 2017-10-22 NOTE — DISCHARGE NOTE ADULT - PROVIDER TOKENS
FREE:[LAST:[Olga],FIRST:[Yg],PHONE:[(   )    -],FAX:[(   )    -],ADDRESS:[Address: [address] [place] [address]  Phone: [phone]]] FREE:[LAST:[Mary],FIRST:[Adam],PHONE:[(   )    -],FAX:[(   )    -]]

## 2017-10-22 NOTE — PROGRESS NOTE ADULT - ASSESSMENT
57 F w/ asthma, AARON, HTN p/w hypercapnic and hypoxic respiratory failure found to have subsegmental PE and pulmonary edema. Suspect hypoxia 2/2 to pulmonary edema from ?undiagnosed heart failure and hypercarbia from non-compliance with CPAP at home vs. asthma exacerbation. 57 F w/ asthma, AARON, HTN p/w hypercapnic and hypoxic respiratory failure found to have subsegmental PE and pulmonary edema. Suspect hypoxia 2/2 to pulmonary edema from ?undiagnosed heart failure and hypercarbia from non-compliance with CPAP at home vs. asthma exacerbation.     Dr. Akbar Harrell, PGY3  [phone]

## 2017-10-22 NOTE — DISCHARGE NOTE ADULT - CARE PLAN
Principal Discharge DX:	Other acute pulmonary embolism without acute cor pulmonale  Goal:	prevent future pulmonary emboli  Instructions for follow-up, activity and diet:	You were admitted to the hospital due to shortness of breath and found to have a blood clot in your lung. You were started on a blood thinner called xarelto to treat the blood clot. Please continue to take this medication as prescribed. Please follow up with pulmonology after discharge from the hospital. Please continue to do physical therapy and stay mobile at home to prevent future blood clots.  Secondary Diagnosis:	Asthma, unspecified asthma severity, unspecified whether complicated, unspecified whether persistent  Secondary Diagnosis:	Essential hypertension  Secondary Diagnosis:	AARON (obstructive sleep apnea)  Instructions for follow-up, activity and diet:	Please continue to use your CPAP machine every night. Principal Discharge DX:	Other acute pulmonary embolism without acute cor pulmonale  Goal:	prevent future pulmonary emboli  Instructions for follow-up, activity and diet:	You were admitted to the hospital due to shortness of breath and found to have a blood clot in your lung. You were started on a blood thinner called xarelto to treat the blood clot. Please continue to take this medication as prescribed. Please follow up with pulmonology after discharge from the hospital. Please continue to do physical therapy and stay mobile at home to prevent future blood clots.  Secondary Diagnosis:	Asthma, unspecified asthma severity, unspecified whether complicated, unspecified whether persistent  Goal:	Treat  Instructions for follow-up, activity and diet:	You were admitted with concerning asthma exacerbation, for which you were treated with various inhaled medications.  Please follow up with your primary care physician.  Secondary Diagnosis:	Essential hypertension  Secondary Diagnosis:	AARON (obstructive sleep apnea)  Instructions for follow-up, activity and diet:	Please continue to use your CPAP machine every night. Principal Discharge DX:	Other acute pulmonary embolism without acute cor pulmonale  Goal:	prevent future pulmonary emboli  Instructions for follow-up, activity and diet:	You were admitted to the hospital due to shortness of breath and found to have a blood clot in your lung. You were started on a blood thinner called xarelto to treat the blood clot. Please continue to take this medication as prescribed. Please follow up with pulmonology after discharge from the hospital. Please continue to do physical therapy and stay mobile at home to prevent future blood clots.  Secondary Diagnosis:	Asthma, unspecified asthma severity, unspecified whether complicated, unspecified whether persistent  Goal:	Treat  Instructions for follow-up, activity and diet:	You were admitted with concerning asthma exacerbation, for which you were treated with various inhaled medications.  Please follow up with your primary care physician.  Secondary Diagnosis:	Essential hypertension  Goal:	Treat  Instructions for follow-up, activity and diet:	Please continue to take your medications as prescribed and follow up with your primary care physician upon discharge.  Secondary Diagnosis:	AARON (obstructive sleep apnea)  Goal:	Treat  Instructions for follow-up, activity and diet:	Please continue to use your CPAP machine as prescribed.

## 2017-10-22 NOTE — DISCHARGE NOTE ADULT - ADDITIONAL INSTRUCTIONS
Please follow up with your primary care and pulmonology doctors. Please follow up with your primary care and pulmonology doctors.  You have an appointment at [facility] on October 30th at 12 pm.  Please discuss your medications with him.  Please take Lasix 20 twice a day and discuss this with your primary care physician. Please follow up with your primary care and pulmonology doctors.  You have an appointment at [facility] on October 30th at 12 pm.  Please discuss your medications with him.  Do not take any more Lasix until further discussion with your primary care physician.

## 2017-10-22 NOTE — DISCHARGE NOTE ADULT - HOSPITAL COURSE
57 F w/ asthma, AARON, and HTN, DVT s/p coumadin presented with 2 weeks of worsening SOB and cough. Pt presented to PCP and was found to be hypoxic to the 70s and transferred to ED for further management. Pt reported she had not been compliant with CPAP for past few nights because lost her machine. VBG in ED showed pCO2 of 107 and patient was placed on bipap. Patient remained alert and oriented. CTA was done and found RML subsegmental PE. She was started on xarelto. RVP negative, Blood cultures negative.    Patient was also found to have pulmonary edema and was given 120mg IV lasix x1 and started on IV 40 BID. Pulmonology was consulted. She was continued on bipap for 1 day and transitioned to NC during the day and bipap at night with saturations remaining in 90s. TTE was done to r/o CHF as cause of pulmonary edema. TTE showed preserved LV function with mild RV systolic dysfunction. PT evaluated patient and recommended to d/c with home physical therapy.

## 2017-10-22 NOTE — DISCHARGE NOTE ADULT - CARE PROVIDER_API CALL
Yg Espinoza  Address: [address] [place] [address]  Phone: [phone]  Phone: (   )    -  Fax: (   )    - Adam Hernandez  Phone: (   )    -  Fax: (   )    -

## 2017-10-22 NOTE — PROGRESS NOTE ADULT - SUBJECTIVE AND OBJECTIVE BOX
Patient is a 57y old  Female who presents with a chief complaint of SOB, cough (22 Oct 2017 13:35)      SUBJECTIVE / OVERNIGHT EVENTS: Reports that she became cyanotic yesterday when she transported back without oxygen. Denies any palpitations or chest pain, no events on telemetry, but still requiring O2 during the day.  Denies any nausea, diarrhea, constipation, vomiting, fevers, chills.    MEDICATIONS  (STANDING):  ALBUTerol/ipratropium for Nebulization 3 milliLiter(s) Nebulizer every 6 hours  azithromycin   Tablet 250 milliGRAM(s) Oral daily  buDESOnide  80 MICROgram(s)/formoterol 4.5 MICROgram(s) Inhaler 2 Puff(s) Inhalation two times a day  fluocinonide 0.05% Cream 1 Application(s) Topical two times a day  furosemide   Injectable 40 milliGRAM(s) IV Push two times a day  rivaroxaban 15 milliGRAM(s) Oral two times a day    MEDICATIONS  (PRN):  petrolatum white Ointment 1 Application(s) Topical daily PRN Dry lips        CAPILLARY BLOOD GLUCOSE        I&O's Summary    21 Oct 2017 07:01  -  22 Oct 2017 07:00  --------------------------------------------------------  IN: 800 mL / OUT: 200 mL / NET: 600 mL    22 Oct 2017 07:01  -  22 Oct 2017 16:02  --------------------------------------------------------  IN: 590 mL / OUT: 230 mL / NET: 360 mL    Vital Signs Last 24 Hrs  T(C): 36.8 (22 Oct 2017 12:07), Max: 37.1 (22 Oct 2017 08:13)  T(F): 98.2 (22 Oct 2017 12:07), Max: 98.8 (22 Oct 2017 08:13)  HR: 99 (22 Oct 2017 12:07) (84 - 99)  BP: 118/67 (22 Oct 2017 12:07) (102/68 - 149/92)  RR: 18 (22 Oct 2017 12:07) (18 - 23)  SpO2: 93% (22 Oct 2017 12:07) (90% - 98%)    PHYSICAL EXAM:  GENERAL : Obese, alert and cooperative on NC. NAD.   HEAD: NC/AT  EYES: clear conjunctiva, EOMI  NECK: Neck supple, non-tender without lymphadenopathy, masses, No JVD, acanthosis nigricans noted  HEART: Normal S1 and S2. regular rate. No S3 or splitting  LUNGS: Decreased breath sounds bilaterally, no wheezing, no crackles appreciated  ABDOMEN: Soft, nondistended, tender to palpation of ventral hernia. No guarding or rebound.   MUSKULOSKELETAL: No joint erythema or tenderness.   EXTREMITIES: 2-3+ b/l pitting LE edema, L worse than R, pulses cannot be appreciated due to edema and body habitus  NEUROLOGICAL: No focal neurologic deficit. Strength and sensation symmetric and intact throughout.   SKIN: Skin clean, dry, intact  PSYCHIATRIC: AOx3.Normal affect and behavior.    LABS:                        13.7   9.80  )-----------( 259      ( 22 Oct 2017 08:32 )             47.8     WBC Trend: 9.80<--, 10.39<--, 9.60<--  10-22    142  |  90<L>  |  18  ----------------------------<  126<H>  3.5   |  41<H>  |  0.75    Ca    9.4      22 Oct 2017 08:32      Creatinine Trend: 0.75<--, 0.76<--, 0.82<--, 0.87<--, 0.76<--, 0.84<--              RADIOLOGY & ADDITIONAL TESTS:    Imaging Personally Reviewed:    Consultant(s) Notes Reviewed:  Pulmonology, cardiology    Care Discussed with Consultants/Other Providers:

## 2017-10-22 NOTE — DISCHARGE NOTE ADULT - PLAN OF CARE
prevent future pulmonary emboli You were admitted to the hospital due to shortness of breath and found to have a blood clot in your lung. You were started on a blood thinner called xarelto to treat the blood clot. Please continue to take this medication as prescribed. Please follow up with pulmonology after discharge from the hospital. Please continue to do physical therapy and stay mobile at home to prevent future blood clots. Please continue to use your CPAP machine every night. Treat You were admitted with concerning asthma exacerbation, for which you were treated with various inhaled medications.  Please follow up with your primary care physician. Please continue to take your medications as prescribed and follow up with your primary care physician upon discharge. Please continue to use your CPAP machine as prescribed.

## 2017-10-22 NOTE — PROGRESS NOTE ADULT - PROBLEM SELECTOR PLAN 3
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

## 2017-10-22 NOTE — PROGRESS NOTE ADULT - PROBLEM SELECTOR PLAN 1
-Suspect hypoxia 2/2 to pulmonary edema and PE and hypercarbia from non-compliance with CPAP at home vs. asthma exacerbation vs CAP  - Elevated bicarb on BMP, pt is likely chronic pCO2 retainer  - improvement on BiPAP yesterday, saturating 93-95 on NC 5L this AM  - c/w IV lasix BID, will decrease to daily tomorow  -on xarelto for PE as this is 2nd provoked event and needs lifelong AC  -Pulm consult - will do 5 day course of azithro  - TTE today - some RV systolic function, EF 55-60%, no PFO, presentation likely not 2/2 HF -Suspect hypoxia 2/2 to pulmonary edema and PE and hypercarbia from non-compliance with CPAP at home vs. asthma exacerbation vs CAP  - Elevated bicarb on BMP, pt is likely chronic pCO2 retainer, possible contribution from diuresis  - improvement on BiPAP yesterday, saturating 93-95 on NC 5L this AM  - c/w IV lasix BID  -on xarelto for PE as this is 2nd provoked event and needs lifelong AC  -Pulm consult - will do 5 day course of azithro  - TTE today - some RV systolic function, EF 55-60%, no PFO, presentation likely not 2/2 HF

## 2017-10-22 NOTE — DISCHARGE NOTE ADULT - SECONDARY DIAGNOSIS.
Asthma, unspecified asthma severity, unspecified whether complicated, unspecified whether persistent Essential hypertension AARON (obstructive sleep apnea)

## 2017-10-22 NOTE — DISCHARGE NOTE ADULT - MEDICATION SUMMARY - MEDICATIONS TO STOP TAKING
I will STOP taking the medications listed below when I get home from the hospital:    potassium chloride 10 mEq oral tablet, extended release  -- 1 tab(s) by mouth once a day    meclizine 25 mg oral tablet  -- 1 tab(s) by mouth 3 times a day, As Needed    amLODIPine 10 mg oral tablet  -- 1 tab(s) by mouth once a day    indapamide 2.5 mg oral tablet  -- 1 tab(s) by mouth once a day (in the morning)    potassium chloride 10mEq oral tablet, extended release  -- 1 tab(s) by mouth 2 times a day    indapamide 2.5 mg oral tablet  -- 1 tab(s) by mouth once a day    Lasix 40 mg oral tablet  -- 1 tab(s) by mouth 2 times a day

## 2017-10-22 NOTE — DISCHARGE NOTE ADULT - MEDICATION SUMMARY - MEDICATIONS TO TAKE
I will START or STAY ON the medications listed below when I get home from the hospital:    rivaroxaban 15 mg oral tablet  -- 1 tab(s) by mouth 2 times a day  -- Indication: For Pulmonary embolism    metoprolol tartrate 50 mg oral tablet  -- 1 tab(s) by mouth 2 times a day  -- Indication: For HTN    Advair Diskus 100 mcg-50 mcg inhalation powder  -- 1 puff(s) inhaled 2 times a day  -- Indication: For Asthma, unspecified asthma severity, unspecified whether complicated, unspecified whether persistent    Ventolin HFA 90 mcg/inh inhalation aerosol  -- 2 puff(s) inhaled every 6 hours, As Needed  -- Indication: For Asthma, unspecified asthma severity, unspecified whether complicated, unspecified whether persistent    Lasix 20 mg oral tablet  -- 20 milligram(s) by mouth 2 times a day  -- Indication: For HTN    Artificial Tears ophthalmic solution  -- 2 drop(s) in each eye 2 times a day  -- Indication: For Eye issue    Multiple Vitamins oral tablet  -- 1 tab(s) by mouth once a day  -- Indication: For Nutritional supplement I will START or STAY ON the medications listed below when I get home from the hospital:    rivaroxaban 15 mg oral tablet  -- 1 tab(s) by mouth 2 times a day  -- Indication: For Pulmonary embolism    metoprolol tartrate 50 mg oral tablet  -- 1 tab(s) by mouth 2 times a day  -- Indication: For HTN    Advair Diskus 100 mcg-50 mcg inhalation powder  -- 1 puff(s) inhaled 2 times a day  -- Indication: For Asthma, unspecified asthma severity, unspecified whether complicated, unspecified whether persistent    Ventolin HFA 90 mcg/inh inhalation aerosol  -- 2 puff(s) inhaled every 6 hours, As Needed  -- Indication: For Asthma, unspecified asthma severity, unspecified whether complicated, unspecified whether persistent    Artificial Tears ophthalmic solution  -- 2 drop(s) in each eye 2 times a day  -- Indication: For Eye issue    Multiple Vitamins oral tablet  -- 1 tab(s) by mouth once a day  -- Indication: For Nutritional supplement

## 2017-10-23 LAB
ANION GAP SERPL CALC-SCNC: 13 MMOL/L — SIGNIFICANT CHANGE UP (ref 5–17)
BUN SERPL-MCNC: 14 MG/DL — SIGNIFICANT CHANGE UP (ref 7–23)
CALCIUM SERPL-MCNC: 9.6 MG/DL — SIGNIFICANT CHANGE UP (ref 8.4–10.5)
CHLORIDE SERPL-SCNC: 88 MMOL/L — LOW (ref 96–108)
CO2 SERPL-SCNC: 39 MMOL/L — HIGH (ref 22–31)
CREAT SERPL-MCNC: 0.71 MG/DL — SIGNIFICANT CHANGE UP (ref 0.5–1.3)
GLUCOSE SERPL-MCNC: 121 MG/DL — HIGH (ref 70–99)
HCT VFR BLD CALC: 46.2 % — HIGH (ref 34.5–45)
HGB BLD-MCNC: 13.5 G/DL — SIGNIFICANT CHANGE UP (ref 11.5–15.5)
MAGNESIUM SERPL-MCNC: 1.8 MG/DL — SIGNIFICANT CHANGE UP (ref 1.6–2.6)
MCHC RBC-ENTMCNC: 23.3 PG — LOW (ref 27–34)
MCHC RBC-ENTMCNC: 29.2 GM/DL — LOW (ref 32–36)
MCV RBC AUTO: 79.7 FL — LOW (ref 80–100)
PHOSPHATE SERPL-MCNC: 4 MG/DL — SIGNIFICANT CHANGE UP (ref 2.5–4.5)
PLATELET # BLD AUTO: 247 K/UL — SIGNIFICANT CHANGE UP (ref 150–400)
POTASSIUM SERPL-MCNC: 3.5 MMOL/L — SIGNIFICANT CHANGE UP (ref 3.5–5.3)
POTASSIUM SERPL-SCNC: 3.5 MMOL/L — SIGNIFICANT CHANGE UP (ref 3.5–5.3)
RBC # BLD: 5.8 M/UL — HIGH (ref 3.8–5.2)
RBC # FLD: 20.2 % — HIGH (ref 10.3–14.5)
SODIUM SERPL-SCNC: 140 MMOL/L — SIGNIFICANT CHANGE UP (ref 135–145)
WBC # BLD: 10.46 K/UL — SIGNIFICANT CHANGE UP (ref 3.8–10.5)
WBC # FLD AUTO: 10.46 K/UL — SIGNIFICANT CHANGE UP (ref 3.8–10.5)

## 2017-10-23 PROCEDURE — 99233 SBSQ HOSP IP/OBS HIGH 50: CPT | Mod: GC

## 2017-10-23 RX ORDER — FUROSEMIDE 40 MG
60 TABLET ORAL ONCE
Qty: 0 | Refills: 0 | Status: COMPLETED | OUTPATIENT
Start: 2017-10-23 | End: 2017-10-23

## 2017-10-23 RX ORDER — FUROSEMIDE 40 MG
60 TABLET ORAL EVERY 12 HOURS
Qty: 0 | Refills: 0 | Status: DISCONTINUED | OUTPATIENT
Start: 2017-10-23 | End: 2017-10-24

## 2017-10-23 RX ORDER — POTASSIUM CHLORIDE 20 MEQ
40 PACKET (EA) ORAL ONCE
Qty: 0 | Refills: 0 | Status: COMPLETED | OUTPATIENT
Start: 2017-10-23 | End: 2017-10-23

## 2017-10-23 RX ORDER — MAGNESIUM SULFATE 500 MG/ML
1 VIAL (ML) INJECTION ONCE
Qty: 0 | Refills: 0 | Status: COMPLETED | OUTPATIENT
Start: 2017-10-23 | End: 2017-10-23

## 2017-10-23 RX ADMIN — Medication 60 MILLIGRAM(S): at 20:29

## 2017-10-23 RX ADMIN — Medication 40 MILLIEQUIVALENT(S): at 10:42

## 2017-10-23 RX ADMIN — Medication 100 GRAM(S): at 10:43

## 2017-10-23 RX ADMIN — BUDESONIDE AND FORMOTEROL FUMARATE DIHYDRATE 2 PUFF(S): 160; 4.5 AEROSOL RESPIRATORY (INHALATION) at 17:29

## 2017-10-23 RX ADMIN — RIVAROXABAN 15 MILLIGRAM(S): KIT at 06:03

## 2017-10-23 RX ADMIN — AZITHROMYCIN 250 MILLIGRAM(S): 500 TABLET, FILM COATED ORAL at 12:19

## 2017-10-23 RX ADMIN — Medication 3 MILLILITER(S): at 23:07

## 2017-10-23 RX ADMIN — Medication 3 MILLILITER(S): at 06:03

## 2017-10-23 RX ADMIN — Medication 1 APPLICATION(S): at 06:13

## 2017-10-23 RX ADMIN — Medication 1 APPLICATION(S): at 17:30

## 2017-10-23 RX ADMIN — Medication 3 MILLILITER(S): at 17:29

## 2017-10-23 RX ADMIN — Medication 3 MILLILITER(S): at 12:54

## 2017-10-23 RX ADMIN — BUDESONIDE AND FORMOTEROL FUMARATE DIHYDRATE 2 PUFF(S): 160; 4.5 AEROSOL RESPIRATORY (INHALATION) at 06:03

## 2017-10-23 RX ADMIN — Medication 60 MILLIGRAM(S): at 15:16

## 2017-10-23 RX ADMIN — RIVAROXABAN 15 MILLIGRAM(S): KIT at 17:29

## 2017-10-23 RX ADMIN — Medication 40 MILLIGRAM(S): at 06:03

## 2017-10-23 NOTE — PROGRESS NOTE ADULT - SUBJECTIVE AND OBJECTIVE BOX
OBJECTIVE:  ICU Vital Signs Last 24 Hrs  T(C): 37.1 (23 Oct 2017 12:25), Max: 37.1 (23 Oct 2017 12:25)  T(F): 98.7 (23 Oct 2017 12:25), Max: 98.7 (23 Oct 2017 12:25)  HR: 82 (23 Oct 2017 12:52) (82 - 101)  BP: 115/70 (23 Oct 2017 12:25) (114/78 - 146/81)  BP(mean): --  ABP: --  ABP(mean): --  RR: 20 (23 Oct 2017 12:52) (18 - 27)  SpO2: 93% (23 Oct 2017 12:52) (90% - 99%)        10-22 @ 07:01  -  10-23 @ 07:00  --------------------------------------------------------  IN: 870 mL / OUT: 1620 mL / NET: -750 mL    10-23 @ 07:01  -  10-23 @ 13:15  --------------------------------------------------------  IN: 280 mL / OUT: 700 mL / NET: -420 mL      CAPILLARY BLOOD GLUCOSE          PHYSICAL EXAM:  General:   HEENT:   Respiratory:   Cardiovascular:   Abdomen:   Extremities:   Skin:   Neurological:    HOSPITAL MEDICATIONS:  rivaroxaban 15 milliGRAM(s) Oral two times a day    azithromycin   Tablet 250 milliGRAM(s) Oral daily    furosemide   Injectable 40 milliGRAM(s) IV Push two times a day      ALBUTerol/ipratropium for Nebulization 3 milliLiter(s) Nebulizer every 6 hours  buDESOnide  80 MICROgram(s)/formoterol 4.5 MICROgram(s) Inhaler 2 Puff(s) Inhalation two times a day                fluocinonide 0.05% Cream 1 Application(s) Topical two times a day  petrolatum white Ointment 1 Application(s) Topical daily PRN        LABS:                        13.5   10.46 )-----------( 247      ( 23 Oct 2017 07:27 )             46.2     Hgb Trend: 13.5<--, 13.7<--, 13.2<--, 13.9<--, 14.1<--  10-23    140  |  88<L>  |  14  ----------------------------<  121<H>  3.5   |  39<H>  |  0.71    Ca    9.6      23 Oct 2017 07:22  Phos  4.0     10-23  Mg     1.8     10-23      Creatinine Trend: 0.71<--, 0.75<--, 0.76<--, 0.82<--, 0.87<--, 0.76<--            MICROBIOLOGY:     RADIOLOGY:  [ ] Reviewed and interpreted by me    PULMONARY FUNCTION TESTS: Pt seen and examined by the bedside this AM. No acute events overnight. Overall feeling better. Denies fevers, chills, chest pain, SOB, abdominal pain, nausea/vomiting, diarrhea. Used bilevel overnight.    OBJECTIVE:  ICU Vital Signs Last 24 Hrs  T(C): 37.1 (23 Oct 2017 12:25), Max: 37.1 (23 Oct 2017 12:25)  T(F): 98.7 (23 Oct 2017 12:25), Max: 98.7 (23 Oct 2017 12:25)  HR: 82 (23 Oct 2017 12:52) (82 - 101)  BP: 115/70 (23 Oct 2017 12:25) (114/78 - 146/81)  BP(mean): --  ABP: --  ABP(mean): --  RR: 20 (23 Oct 2017 12:52) (18 - 27)  SpO2: 93% (23 Oct 2017 12:52) (90% - 99%)      10-22 @ 07:01  -  10-23 @ 07:00  --------------------------------------------------------  IN: 870 mL / OUT: 1620 mL / NET: -750 mL    10-23 @ 07:01  -  10-23 @ 13:15  --------------------------------------------------------  IN: 280 mL / OUT: 700 mL / NET: -420 mL    PHYSICAL EXAM:  General: obese, NAD  HEENT: NCAT, moist mucosal membranes  Respiratory: CTAB, though difficult 2/2 body habitus  Cardiovascular: S1/S2 normal, RRR, no murmurs/rubs/gallops appreicated  Abdomen: soft, nontender, nondistended  Extremities: 1+ pitting edema b/l LE  Skin: warm/dry  Neurological: AAOx3, answering questions appropriately; no focal deficits    HOSPITAL MEDICATIONS:  rivaroxaban 15 milliGRAM(s) Oral two times a day    azithromycin   Tablet 250 milliGRAM(s) Oral daily    furosemide   Injectable 40 milliGRAM(s) IV Push two times a day      ALBUTerol/ipratropium for Nebulization 3 milliLiter(s) Nebulizer every 6 hours  buDESOnide  80 MICROgram(s)/formoterol 4.5 MICROgram(s) Inhaler 2 Puff(s) Inhalation two times a day                fluocinonide 0.05% Cream 1 Application(s) Topical two times a day  petrolatum white Ointment 1 Application(s) Topical daily PRN        LABS:                        13.5   10.46 )-----------( 247      ( 23 Oct 2017 07:27 )             46.2     Hgb Trend: 13.5<--, 13.7<--, 13.2<--, 13.9<--, 14.1<--  10-23    140  |  88<L>  |  14  ----------------------------<  121<H>  3.5   |  39<H>  |  0.71    Ca    9.6      23 Oct 2017 07:22  Phos  4.0     10-23  Mg     1.8     10-23      Creatinine Trend: 0.71<--, 0.75<--, 0.76<--, 0.82<--, 0.87<--, 0.76<--

## 2017-10-23 NOTE — PROGRESS NOTE ADULT - PROBLEM SELECTOR PLAN 4
-Possible exacerbation contributing to her symptoms, significantly improved with BiPAP and Lasix therefore more likely due to pulm edema, afebrile,   - c/w azithro 5 day course for CAP  - will c/w duonebs q6 for now  - blood cultures NGTD  - will f/u with CM as patient needs new CPAP machine at home

## 2017-10-23 NOTE — PROGRESS NOTE ADULT - SUBJECTIVE AND OBJECTIVE BOX
Subjective:    Patient is a 57y old  Female who presents with a chief complaint of SOB, cough (22 Oct 2017 13:35) - no events o/n, still some SOB w/ walking, no bleeding    MEDICATIONS  (STANDING):  ALBUTerol/ipratropium for Nebulization 3 milliLiter(s) Nebulizer every 6 hours  azithromycin   Tablet 250 milliGRAM(s) Oral daily  buDESOnide  80 MICROgram(s)/formoterol 4.5 MICROgram(s) Inhaler 2 Puff(s) Inhalation two times a day  fluocinonide 0.05% Cream 1 Application(s) Topical two times a day  furosemide   Injectable 40 milliGRAM(s) IV Push two times a day  magnesium sulfate  IVPB 1 Gram(s) IV Intermittent once  potassium chloride    Tablet ER 40 milliEquivalent(s) Oral once  rivaroxaban 15 milliGRAM(s) Oral two times a day    MEDICATIONS  (PRN):  petrolatum white Ointment 1 Application(s) Topical daily PRN Dry lips        Objective:    Vitals: Vital Signs Last 24 Hrs  T(C): 36.8 (10-23-17 @ 08:48), Max: 36.9 (10-23-17 @ 00:29)  T(F): 98.3 (10-23-17 @ 08:48), Max: 98.5 (10-23-17 @ 00:29)  HR: 101 (10-23-17 @ 08:48) (84 - 101)  BP: 136/77 (10-23-17 @ 08:48) (114/78 - 146/81)  BP(mean): --  RR: 20 (10-23-17 @ 08:48) (18 - 27)  SpO2: 95% (10-23-17 @ 08:48) (90% - 99%)            I&O's Summary    22 Oct 2017 07:01  -  23 Oct 2017 07:00  --------------------------------------------------------  IN: 870 mL / OUT: 1620 mL / NET: -750 mL        PHYSICAL EXAM:  GENERAL: NAD, well-groomed, well-developed  HEAD:  Atraumatic, Normocephalic  CHEST/LUNG: Clear to percussion bilaterally; No rales, rhonchi, wheezing, or rubs  HEART: Regular rate and rhythm; No murmurs, rubs, or gallops  ABDOMEN: Soft, Nontender, Nondistended; Bowel sounds present                                        LABS:  10-23    140  |  88<L>  |  14  ----------------------------<  121<H>  3.5   |  39<H>  |  0.71  10-22    142  |  90<L>  |  18  ----------------------------<  126<H>  3.5   |  41<H>  |  0.75  10-21    144  |  91<L>  |  19  ----------------------------<  103<H>  4.0   |  41<H>  |  0.76    Ca    9.6      23 Oct 2017 07:22  Ca    9.4      22 Oct 2017 08:32  Ca    9.1      21 Oct 2017 08:59  Phos  4.0     10-23  Mg     1.8     10-23                          13.5   10.46 )-----------( 247      ( 23 Oct 2017 07:27 )             46.2                         13.7   9.80  )-----------( 259      ( 22 Oct 2017 08:32 )             47.8                         13.2   10.39 )-----------( 241      ( 21 Oct 2017 08:48 )             46.9

## 2017-10-23 NOTE — PROGRESS NOTE ADULT - ASSESSMENT
----------------------------------------  Jessi Talley MD PGY-4  Pulmonary/Critical Care Fellow  Pager # [phone] (NS), [phone] ([facility]) 57F hx AARON on CPAP, provoked DVT (2012) prev on coumadin, asthma, DM2, obesity p/w cough and dyspnea.     #cough/dyspnea, hypercapneic resp failure  Unclear etiology of cough/dyspnea, though on imaging appears to have b/l upper lobe GGO and interlobular septal thickening. Subseg PE also noted though this likely is not contributor as it appears to be small. Initially unclear if due to PNA vs. fluid overload. TTE 60-65% and no diastolic dysfunction. Pt has been diuresed though now has become more alkalotic. Most likely underlying AARON/OHS also contributing to hypercapneia.   - please obtain ABG to assess for hypercapneia, caleb as symptoms have now improved  - will require CT chest in 4-6 weeks to assess for resolution of RLL nodule  - would hold lasix for next 24 hours given rising bicarb  - cont bilevel at night, will need to have appropriate settings placed prior to discharge ( is resolving home bilevel issue)  - cont supplemental O2, though would titrate down as tolerated with O2 Sat > 88%  - cont abx x 5 days total to complete tx for possible underlying PNA  - f/u finalcultures    #PE  Subsegmental PEs noted on CTPA  - cont NOAC: xarelto    Case d/w Dr. Brar (Pulmonary Attending)    ----------------------------------------  Jessi Talley MD PGY-4  Pulmonary/Critical Care Fellow  Pager # [phone] (NS), [phone] ([facility])

## 2017-10-23 NOTE — PROGRESS NOTE ADULT - PROBLEM SELECTOR PLAN 3
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

## 2017-10-23 NOTE — PROGRESS NOTE ADULT - PROBLEM SELECTOR PLAN 1
Suspect hypoxia 2/2 to pulmonary edema and PE and hypercarbia from non-compliance with CPAP at home vs. asthma exacerbation   - pt is likely chronic pCO2 retainer, possible contribution from diuresis  - improvement on BiPAP yesterday, saturating 93-95 on NC 5L  - c/w IV lasix BID  -on xarelto for PE - lifelong  -Pulm consult - will do 5 day course of azithro  - TTE shows RV systolic function, EF 55-60%, no PFO, presentation likely not 2/2 HF

## 2017-10-24 LAB
ALBUMIN SERPL ELPH-MCNC: 3.4 G/DL — SIGNIFICANT CHANGE UP (ref 3.3–5)
ALP SERPL-CCNC: 62 U/L — SIGNIFICANT CHANGE UP (ref 40–120)
ALT FLD-CCNC: 29 U/L — SIGNIFICANT CHANGE UP (ref 10–45)
ANION GAP SERPL CALC-SCNC: 12 MMOL/L — SIGNIFICANT CHANGE UP (ref 5–17)
ANISOCYTOSIS BLD QL: SLIGHT — SIGNIFICANT CHANGE UP
AST SERPL-CCNC: 46 U/L — HIGH (ref 10–40)
BASE EXCESS BLDA CALC-SCNC: 17 MMOL/L — HIGH (ref -2–2)
BASOPHILS # BLD AUTO: 0.02 K/UL — SIGNIFICANT CHANGE UP (ref 0–0.2)
BASOPHILS NFR BLD AUTO: 0.2 % — SIGNIFICANT CHANGE UP (ref 0–2)
BILIRUB SERPL-MCNC: 1.1 MG/DL — SIGNIFICANT CHANGE UP (ref 0.2–1.2)
BUN SERPL-MCNC: 14 MG/DL — SIGNIFICANT CHANGE UP (ref 7–23)
CALCIUM SERPL-MCNC: 9.2 MG/DL — SIGNIFICANT CHANGE UP (ref 8.4–10.5)
CHLORIDE SERPL-SCNC: 89 MMOL/L — LOW (ref 96–108)
CO2 BLDA-SCNC: 45 MMOL/L — HIGH (ref 22–30)
CO2 SERPL-SCNC: 41 MMOL/L — HIGH (ref 22–31)
CREAT SERPL-MCNC: 0.71 MG/DL — SIGNIFICANT CHANGE UP (ref 0.5–1.3)
CULTURE RESULTS: SIGNIFICANT CHANGE UP
CULTURE RESULTS: SIGNIFICANT CHANGE UP
EOSINOPHIL # BLD AUTO: 0.34 K/UL — SIGNIFICANT CHANGE UP (ref 0–0.5)
EOSINOPHIL NFR BLD AUTO: 3.6 % — SIGNIFICANT CHANGE UP (ref 0–6)
GAS PNL BLDA: SIGNIFICANT CHANGE UP
GLUCOSE SERPL-MCNC: 131 MG/DL — HIGH (ref 70–99)
HCO3 BLDA-SCNC: 43 MMOL/L — HIGH (ref 21–29)
HCT VFR BLD CALC: 48.9 % — HIGH (ref 34.5–45)
HGB BLD-MCNC: 14.1 G/DL — SIGNIFICANT CHANGE UP (ref 11.5–15.5)
IMM GRANULOCYTES NFR BLD AUTO: 0.2 % — SIGNIFICANT CHANGE UP (ref 0–1.5)
LYMPHOCYTES # BLD AUTO: 1.15 K/UL — SIGNIFICANT CHANGE UP (ref 1–3.3)
LYMPHOCYTES # BLD AUTO: 12.3 % — LOW (ref 13–44)
MAGNESIUM SERPL-MCNC: 2.1 MG/DL — SIGNIFICANT CHANGE UP (ref 1.6–2.6)
MANUAL SMEAR VERIFICATION: SIGNIFICANT CHANGE UP
MCHC RBC-ENTMCNC: 23 PG — LOW (ref 27–34)
MCHC RBC-ENTMCNC: 28.8 GM/DL — LOW (ref 32–36)
MCV RBC AUTO: 79.9 FL — LOW (ref 80–100)
MICROCYTES BLD QL: SLIGHT — SIGNIFICANT CHANGE UP
MONOCYTES # BLD AUTO: 0.54 K/UL — SIGNIFICANT CHANGE UP (ref 0–0.9)
MONOCYTES NFR BLD AUTO: 5.8 % — SIGNIFICANT CHANGE UP (ref 2–14)
NEUTROPHILS # BLD AUTO: 7.27 K/UL — SIGNIFICANT CHANGE UP (ref 1.8–7.4)
NEUTROPHILS NFR BLD AUTO: 77.9 % — HIGH (ref 43–77)
PCO2 BLDA: 51 MMHG — HIGH (ref 32–46)
PH BLDA: 7.53 — HIGH (ref 7.35–7.45)
PHOSPHATE SERPL-MCNC: 3 MG/DL — SIGNIFICANT CHANGE UP (ref 2.5–4.5)
PLAT MORPH BLD: NORMAL — SIGNIFICANT CHANGE UP
PLATELET # BLD AUTO: 240 K/UL — SIGNIFICANT CHANGE UP (ref 150–400)
PO2 BLDA: 95 MMHG — SIGNIFICANT CHANGE UP (ref 74–108)
POTASSIUM SERPL-MCNC: 3.6 MMOL/L — SIGNIFICANT CHANGE UP (ref 3.5–5.3)
POTASSIUM SERPL-SCNC: 3.6 MMOL/L — SIGNIFICANT CHANGE UP (ref 3.5–5.3)
PROT SERPL-MCNC: 7.8 G/DL — SIGNIFICANT CHANGE UP (ref 6–8.3)
RBC # BLD: 6.12 M/UL — HIGH (ref 3.8–5.2)
RBC # FLD: 20.4 % — HIGH (ref 10.3–14.5)
RBC BLD AUTO: ABNORMAL
SAO2 % BLDA: 98 % — HIGH (ref 92–96)
SODIUM SERPL-SCNC: 142 MMOL/L — SIGNIFICANT CHANGE UP (ref 135–145)
SPECIMEN SOURCE: SIGNIFICANT CHANGE UP
SPECIMEN SOURCE: SIGNIFICANT CHANGE UP
WBC # BLD: 9.34 K/UL — SIGNIFICANT CHANGE UP (ref 3.8–10.5)
WBC # FLD AUTO: 9.34 K/UL — SIGNIFICANT CHANGE UP (ref 3.8–10.5)

## 2017-10-24 PROCEDURE — 99233 SBSQ HOSP IP/OBS HIGH 50: CPT | Mod: GC

## 2017-10-24 RX ORDER — RIVAROXABAN 15 MG-20MG
1 KIT ORAL
Qty: 0 | Refills: 0 | COMMUNITY
Start: 2017-10-24

## 2017-10-24 RX ORDER — FUROSEMIDE 40 MG
1 TABLET ORAL
Qty: 0 | Refills: 0 | COMMUNITY

## 2017-10-24 RX ORDER — NYSTATIN CREAM 100000 [USP'U]/G
1 CREAM TOPICAL EVERY 6 HOURS
Qty: 0 | Refills: 0 | Status: DISCONTINUED | OUTPATIENT
Start: 2017-10-24 | End: 2017-10-25

## 2017-10-24 RX ADMIN — BUDESONIDE AND FORMOTEROL FUMARATE DIHYDRATE 2 PUFF(S): 160; 4.5 AEROSOL RESPIRATORY (INHALATION) at 17:22

## 2017-10-24 RX ADMIN — Medication 1 APPLICATION(S): at 05:10

## 2017-10-24 RX ADMIN — AZITHROMYCIN 250 MILLIGRAM(S): 500 TABLET, FILM COATED ORAL at 12:24

## 2017-10-24 RX ADMIN — Medication 3 MILLILITER(S): at 12:24

## 2017-10-24 RX ADMIN — RIVAROXABAN 15 MILLIGRAM(S): KIT at 05:11

## 2017-10-24 RX ADMIN — Medication 3 MILLILITER(S): at 05:11

## 2017-10-24 RX ADMIN — Medication 1 APPLICATION(S): at 17:20

## 2017-10-24 RX ADMIN — Medication 60 MILLIGRAM(S): at 08:32

## 2017-10-24 RX ADMIN — Medication 3 MILLILITER(S): at 17:21

## 2017-10-24 RX ADMIN — Medication 3 MILLILITER(S): at 23:01

## 2017-10-24 RX ADMIN — RIVAROXABAN 15 MILLIGRAM(S): KIT at 17:21

## 2017-10-24 RX ADMIN — BUDESONIDE AND FORMOTEROL FUMARATE DIHYDRATE 2 PUFF(S): 160; 4.5 AEROSOL RESPIRATORY (INHALATION) at 05:11

## 2017-10-24 NOTE — PROGRESS NOTE ADULT - PROBLEM SELECTOR PLAN 1
Suspect hypoxia 2/2 to pulmonary edema and PE and hypercarbia from non-compliance with CPAP at home vs. asthma exacerbation   - improvement on BiPAP yesterday, saturating 93-95 on NC 5L  - c/w IV lasix BID  -on xarelto for PE - lifelong Suspect hypoxia 2/2 to pulmonary edema and PE and hypercarbia from non-compliance with CPAP at home vs. asthma exacerbation   - O2 downtitrated today and pt saturating low 90's on RA however, pt desaturates on ambulation with improvement in sat when placed on nasal canula  - IV lasix BID discontinued as pt appears euvolemic and is lab work with increasing bicarb  -on xarelto for PE - lifelong

## 2017-10-24 NOTE — PROGRESS NOTE ADULT - PROBLEM SELECTOR PLAN 2
- c/w xarelto  - sinus tachycardia on monitor, will observe on tele - c/w xarelto  - sinus tachycardia on monitor, will observe on tele  - check ABG  - Pulm following

## 2017-10-24 NOTE — PROGRESS NOTE ADULT - SUBJECTIVE AND OBJECTIVE BOX
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    MEDICATIONS  (STANDING):  ALBUTerol/ipratropium for Nebulization 3 milliLiter(s) Nebulizer every 6 hours  azithromycin   Tablet 250 milliGRAM(s) Oral daily  buDESOnide  80 MICROgram(s)/formoterol 4.5 MICROgram(s) Inhaler 2 Puff(s) Inhalation two times a day  fluocinonide 0.05% Cream 1 Application(s) Topical two times a day  rivaroxaban 15 milliGRAM(s) Oral two times a day    MEDICATIONS  (PRN):  petrolatum white Ointment 1 Application(s) Topical daily PRN Dry lips    Objective:    Vitals: Vital Signs Last 24 Hrs  T(C): 37.1 (10-24-17 @ 08:30), Max: 37.1 (10-23-17 @ 12:25)  T(F): 98.8 (10-24-17 @ 08:30), Max: 98.8 (10-23-17 @ 20:01)  HR: 106 (10-24-17 @ 08:30) (82 - 106)  BP: 136/79 (10-24-17 @ 08:30) (115/70 - 143/83)  BP(mean): --  RR: 18 (10-24-17 @ 08:30) (18 - 20)  SpO2: 92% (10-24-17 @ 08:30) (92% - 98%)            I&O's Summary    23 Oct 2017 07:01  -  24 Oct 2017 07:00  --------------------------------------------------------  IN: 670 mL / OUT: 2650 mL / NET: -1980 mL    24 Oct 2017 07:01  -  24 Oct 2017 09:08  --------------------------------------------------------  IN: 0 mL / OUT: 100 mL / NET: -100 mL    PHYSICAL EXAM:  GENERAL: NAD, well-groomed, well-developed  HEAD:  Atraumatic, Normocephalic  CHEST/LUNG: dec BS at bases; No rales, rhonchi, wheezing, or rubs  HEART: Regular rate and rhythm; possible S3  ABDOMEN: Soft, Nontender, Nondistended; Bowel sounds present  EXTREMITIES:  2+ Peripheral Pulses, 2+ pitting edema    LABS:  10-24    142  |  89<L>  |  14  ----------------------------<  131<H>  3.6   |  41<H>  |  0.71  10-23    140  |  88<L>  |  14  ----------------------------<  121<H>  3.5   |  39<H>  |  0.71  10-22    142  |  90<L>  |  18  ----------------------------<  126<H>  3.5   |  41<H>  |  0.75    Ca    9.2      24 Oct 2017 07:28  Ca    9.6      23 Oct 2017 07:22  Ca    9.4      22 Oct 2017 08:32  Phos  3.0     10-24  Mg     2.1     10-24    TPro  7.8  /  Alb  3.4  /  TBili  1.1  /  DBili  x   /  AST  46<H>  /  ALT  29  /  AlkPhos  62  10-24                 14.1   9.34  )-----------( 240      ( 24 Oct 2017 07:26 )             48.9                         13.5   10.46 )-----------( 247      ( 23 Oct 2017 07:27 )             46.2                         13.7   9.80  )-----------( 259      ( 22 Oct 2017 08:32 )             47.8

## 2017-10-24 NOTE — PROGRESS NOTE ADULT - PROBLEM SELECTOR PLAN 5
- restart home metoprolol today amlodipine as tolerated  - c/w lasix BID - At goal.   - Consider restarting home medications metoprolol,  amlodipine as hemodynamics have improved

## 2017-10-24 NOTE — PROGRESS NOTE ADULT - ASSESSMENT
57 F w/ asthma, AARON, HTN p/w hypercapnic and hypoxic respiratory failure found to have subsegmental PE and pulmonary edema. Suspect hypoxia 2/2 to pulmonary edema from ?undiagnosed heart failure and hypercarbia from non-compliance with CPAP at home w/ possible asthma exacerbation

## 2017-10-24 NOTE — PROGRESS NOTE ADULT - ASSESSMENT
----------------------------------------  Jessi Talley MD PGY-4  Pulmonary/Critical Care Fellow  Pager # [phone] (NS), [phone] ([facility]) 57F hx AARON on CPAP, provoked DVT (2012) prev on coumadin, asthma, DM2, obesity p/w cough and dyspnea.     #cough/dyspnea, hypercapneic resp failure  Unclear etiology of cough/dyspnea, though on imaging appears to have b/l upper lobe GGO and interlobular septal thickening. Subseg PE also noted though this likely is not contributor as it appears to be small. Initially unclear if due to PNA vs. fluid overload. TTE 60-65% and no diastolic dysfunction. Pt has been diuresed though now has become more alkalotic. Most likely underlying AARON/OHS also contributing to hypercapneia.   - please obtain ABG to assess for hypercapneia and alkalosis  - will require CT chest in 4-6 weeks to assess for resolution of RLL nodule  - would hold lasix for next 24 hours given rising bicarb  - cont bilevel at night, will need to have appropriate settings placed prior to discharge  - cont supplemental O2, though would titrate down as tolerated with O2 Sat > 88%  - cont abx x 5 days total to complete tx for possible underlying PNA  - f/u final cultures    #PE  Subsegmental PEs noted on CTPA  - cont NOAC: xarelto    Case d/w Dr. Brar (Pulmonary Attending) and primary team    ----------------------------------------  Jessi Talley MD PGY-4  Pulmonary/Critical Care Fellow  Pager # [phone] (NS), [phone] ([facility]) 57F hx AARON on CPAP, provoked DVT (2012) prev on coumadin, asthma, DM2, obesity p/w cough and dyspnea.     #cough/dyspnea, hypercapneic resp failure  Unclear etiology of cough/dyspnea, though on imaging appears to have b/l upper lobe GGO and interlobular septal thickening. Subseg PE also noted though this likely is not contributor as it appears to be small. Initially unclear if due to PNA vs. fluid overload. TTE 60-65% and no diastolic dysfunction. Pt has been diuresed though now has become more alkalotic. Most likely underlying AARON/OHS also contributing to hypercapneia.   - please obtain ABG to assess for hypercapneia and alkalosis  - will require CT chest in 4-6 weeks to assess for resolution of RLL nodule  - would hold lasix for next 24 hours given rising bicarb  - would replete K > 4, may be contributing to muscle cramping?  - cont bilevel at night, will need to have appropriate settings placed prior to discharge  - cont supplemental O2, though would titrate down as tolerated with O2 Sat > 88%  - cont abx x 5 days total to complete tx for possible underlying PNA  - f/u final cultures    #PE  Subsegmental PEs noted on CTPA  - cont NOAC: xarelto    Case d/w Dr. Brar (Pulmonary Attending) and primary team    ----------------------------------------  Jessi Talley MD PGY-4  Pulmonary/Critical Care Fellow  Pager # [phone] (NS), [phone] ([facility])

## 2017-10-24 NOTE — CHART NOTE - NSCHARTNOTEFT_GEN_A_CORE
Pt was 91-92% on room air; pt desaturated to 68% on room air while ambulating to the bathroom.  Pt was placed on 2 L NC; pt came back up to 91-92% and remained at 91-92% on 2L NC with ambulation. I have examined the pt and requires home O2 due to low sats and acute on chronic respiratory failure with hypoxia    Pt was 91-92% on room air at rest; pt desaturated to 68% on room air while ambulating to the bathroom.  Pt was placed on 2 L NC; pt came back up to 91-92% and remained at 91-92% on 2L NC with ambulation.

## 2017-10-24 NOTE — PROGRESS NOTE ADULT - SUBJECTIVE AND OBJECTIVE BOX
OBJECTIVE:  ICU Vital Signs Last 24 Hrs  T(C): 36.7 (24 Oct 2017 04:54), Max: 37.1 (23 Oct 2017 12:25)  T(F): 98.1 (24 Oct 2017 04:54), Max: 98.8 (23 Oct 2017 20:01)  HR: 102 (24 Oct 2017 04:54) (82 - 102)  BP: 143/83 (24 Oct 2017 04:54) (115/70 - 143/83)  BP(mean): --  ABP: --  ABP(mean): --  RR: 18 (24 Oct 2017 04:54) (18 - 20)  SpO2: 94% (24 Oct 2017 04:54) (92% - 98%)        10-23 @ 07:01  -  10-24 @ 07:00  --------------------------------------------------------  IN: 670 mL / OUT: 2650 mL / NET: -1980 mL      CAPILLARY BLOOD GLUCOSE          PHYSICAL EXAM:  General:   HEENT:   Respiratory:   Cardiovascular:   Abdomen:   Extremities:   Skin:   Neurological:    HOSPITAL MEDICATIONS:  rivaroxaban 15 milliGRAM(s) Oral two times a day    azithromycin   Tablet 250 milliGRAM(s) Oral daily    furosemide   Injectable 60 milliGRAM(s) IV Push every 12 hours      ALBUTerol/ipratropium for Nebulization 3 milliLiter(s) Nebulizer every 6 hours  buDESOnide  80 MICROgram(s)/formoterol 4.5 MICROgram(s) Inhaler 2 Puff(s) Inhalation two times a day                fluocinonide 0.05% Cream 1 Application(s) Topical two times a day  petrolatum white Ointment 1 Application(s) Topical daily PRN        LABS:                        14.1   9.34  )-----------( 240      ( 24 Oct 2017 07:26 )             48.9     Hgb Trend: 14.1<--, 13.5<--, 13.7<--, 13.2<--, 13.9<--  10-23    140  |  88<L>  |  14  ----------------------------<  121<H>  3.5   |  39<H>  |  0.71    Ca    9.6      23 Oct 2017 07:22  Phos  4.0     10-23  Mg     1.8     10-23      Creatinine Trend: 0.71<--, 0.75<--, 0.76<--, 0.82<--, 0.87<--, 0.76<--            MICROBIOLOGY:     RADIOLOGY:  [ ] Reviewed and interpreted by me    PULMONARY FUNCTION TESTS: Pt seen and examined by the bedside. No acute events overnight. CPAP brought in by  today, being evaluated by biomed. Net negative approximately -2L. Overall feeling better but having cramps in her hands.     OBJECTIVE:  ICU Vital Signs Last 24 Hrs  T(C): 36.7 (24 Oct 2017 04:54), Max: 37.1 (23 Oct 2017 12:25)  T(F): 98.1 (24 Oct 2017 04:54), Max: 98.8 (23 Oct 2017 20:01)  HR: 102 (24 Oct 2017 04:54) (82 - 102)  BP: 143/83 (24 Oct 2017 04:54) (115/70 - 143/83)  BP(mean): --  ABP: --  ABP(mean): --  RR: 18 (24 Oct 2017 04:54) (18 - 20)  SpO2: 94% (24 Oct 2017 04:54) (92% - 98%)      10-23 @ 07:01  -  10-24 @ 07:00  --------------------------------------------------------  IN: 670 mL / OUT: 2650 mL / NET: -1980 mL      PHYSICAL EXAM:  General: obese, NAD  HEENT: NCAT, moist mucosal membranes  Respiratory: CTAB, though difficult 2/2 body habitus  Cardiovascular: S1/S2 normal, RRR, no murmurs/rubs/gallops appreicated  Abdomen: soft, nontender, nondistended  Extremities: 1+ pitting edema b/l LE  Skin: warm/dry  Neurological: AAOx3, answering questions appropriately; no focal deficits    HOSPITAL MEDICATIONS:  rivaroxaban 15 milliGRAM(s) Oral two times a day    azithromycin   Tablet 250 milliGRAM(s) Oral daily    furosemide   Injectable 60 milliGRAM(s) IV Push every 12 hours      ALBUTerol/ipratropium for Nebulization 3 milliLiter(s) Nebulizer every 6 hours  buDESOnide  80 MICROgram(s)/formoterol 4.5 MICROgram(s) Inhaler 2 Puff(s) Inhalation two times a day      fluocinonide 0.05% Cream 1 Application(s) Topical two times a day  petrolatum white Ointment 1 Application(s) Topical daily PRN        LABS:                        14.1   9.34  )-----------( 240      ( 24 Oct 2017 07:26 )             48.9     Hgb Trend: 14.1<--, 13.5<--, 13.7<--, 13.2<--, 13.9<--  10-23    140  |  88<L>  |  14  ----------------------------<  121<H>  3.5   |  39<H>  |  0.71    Ca    9.6      23 Oct 2017 07:22  Phos  4.0     10-23  Mg     1.8     10-23      Creatinine Trend: 0.71<--, 0.75<--, 0.76<--, 0.82<--, 0.87<--, 0.76<--    Bedside US: Predominantly Alines, no B lines, no pleural effusion or consolidation noted

## 2017-10-24 NOTE — PROGRESS NOTE ADULT - PROBLEM SELECTOR PLAN 4
Unclear if asthma is contributing  - completed CAP course  - will c/w lilli q6 for now  - pt will bring in CPAP

## 2017-10-24 NOTE — PROGRESS NOTE ADULT - PROBLEM SELECTOR PLAN 3
suspect 2/2 acute heart failure possibly on top of chronic with pHTN due to AARON and asthma  - c/w IV lasix 40 BID  -strict i/o, daily weights + low salt diet suspect 2/2 acute heart failure possibly on top of chronic with pHTN due to AARON and asthma. Resolved  - Hold off on further diuresis  -strict i/o, daily weights + low salt diet

## 2017-10-24 NOTE — PROVIDER CONTACT NOTE (OTHER) - ASSESSMENT
Closed lesions to both upper and lower lips (one lesion to each lip) with trace swelling at periwound area. Pt c/o tingling sensation.

## 2017-10-25 VITALS
DIASTOLIC BLOOD PRESSURE: 74 MMHG | RESPIRATION RATE: 18 BRPM | OXYGEN SATURATION: 94 % | HEART RATE: 98 BPM | TEMPERATURE: 98 F | SYSTOLIC BLOOD PRESSURE: 121 MMHG

## 2017-10-25 LAB
ALBUMIN SERPL ELPH-MCNC: 3.7 G/DL — SIGNIFICANT CHANGE UP (ref 3.3–5)
ALP SERPL-CCNC: 60 U/L — SIGNIFICANT CHANGE UP (ref 40–120)
ALT FLD-CCNC: 32 U/L — SIGNIFICANT CHANGE UP (ref 10–45)
ANION GAP SERPL CALC-SCNC: 11 MMOL/L — SIGNIFICANT CHANGE UP (ref 5–17)
AST SERPL-CCNC: 42 U/L — HIGH (ref 10–40)
BASOPHILS # BLD AUTO: 0.04 K/UL — SIGNIFICANT CHANGE UP (ref 0–0.2)
BASOPHILS NFR BLD AUTO: 0.4 % — SIGNIFICANT CHANGE UP (ref 0–2)
BILIRUB SERPL-MCNC: 1.3 MG/DL — HIGH (ref 0.2–1.2)
BUN SERPL-MCNC: 17 MG/DL — SIGNIFICANT CHANGE UP (ref 7–23)
CALCIUM SERPL-MCNC: 10 MG/DL — SIGNIFICANT CHANGE UP (ref 8.4–10.5)
CHLORIDE SERPL-SCNC: 87 MMOL/L — LOW (ref 96–108)
CO2 SERPL-SCNC: 41 MMOL/L — HIGH (ref 22–31)
CREAT SERPL-MCNC: 0.68 MG/DL — SIGNIFICANT CHANGE UP (ref 0.5–1.3)
EOSINOPHIL # BLD AUTO: 0.3 K/UL — SIGNIFICANT CHANGE UP (ref 0–0.5)
EOSINOPHIL NFR BLD AUTO: 3.3 % — SIGNIFICANT CHANGE UP (ref 0–6)
GLUCOSE SERPL-MCNC: 141 MG/DL — HIGH (ref 70–99)
HCT VFR BLD CALC: 48.7 % — HIGH (ref 34.5–45)
HGB BLD-MCNC: 14.5 G/DL — SIGNIFICANT CHANGE UP (ref 11.5–15.5)
IMM GRANULOCYTES NFR BLD AUTO: 0.2 % — SIGNIFICANT CHANGE UP (ref 0–1.5)
LYMPHOCYTES # BLD AUTO: 1.57 K/UL — SIGNIFICANT CHANGE UP (ref 1–3.3)
LYMPHOCYTES # BLD AUTO: 17.1 % — SIGNIFICANT CHANGE UP (ref 13–44)
MAGNESIUM SERPL-MCNC: 2 MG/DL — SIGNIFICANT CHANGE UP (ref 1.6–2.6)
MCHC RBC-ENTMCNC: 23.6 PG — LOW (ref 27–34)
MCHC RBC-ENTMCNC: 29.8 GM/DL — LOW (ref 32–36)
MCV RBC AUTO: 79.2 FL — LOW (ref 80–100)
MONOCYTES # BLD AUTO: 0.68 K/UL — SIGNIFICANT CHANGE UP (ref 0–0.9)
MONOCYTES NFR BLD AUTO: 7.4 % — SIGNIFICANT CHANGE UP (ref 2–14)
NEUTROPHILS # BLD AUTO: 6.55 K/UL — SIGNIFICANT CHANGE UP (ref 1.8–7.4)
NEUTROPHILS NFR BLD AUTO: 71.6 % — SIGNIFICANT CHANGE UP (ref 43–77)
PHOSPHATE SERPL-MCNC: 3.2 MG/DL — SIGNIFICANT CHANGE UP (ref 2.5–4.5)
PLATELET # BLD AUTO: 275 K/UL — SIGNIFICANT CHANGE UP (ref 150–400)
POTASSIUM SERPL-MCNC: 3.5 MMOL/L — SIGNIFICANT CHANGE UP (ref 3.5–5.3)
POTASSIUM SERPL-SCNC: 3.5 MMOL/L — SIGNIFICANT CHANGE UP (ref 3.5–5.3)
PROT SERPL-MCNC: 8.1 G/DL — SIGNIFICANT CHANGE UP (ref 6–8.3)
RBC # BLD: 6.15 M/UL — HIGH (ref 3.8–5.2)
RBC # FLD: 20.8 % — HIGH (ref 10.3–14.5)
SODIUM SERPL-SCNC: 139 MMOL/L — SIGNIFICANT CHANGE UP (ref 135–145)
WBC # BLD: 9.16 K/UL — SIGNIFICANT CHANGE UP (ref 3.8–10.5)
WBC # FLD AUTO: 9.16 K/UL — SIGNIFICANT CHANGE UP (ref 3.8–10.5)

## 2017-10-25 PROCEDURE — 96376 TX/PRO/DX INJ SAME DRUG ADON: CPT

## 2017-10-25 PROCEDURE — 97161 PT EVAL LOW COMPLEX 20 MIN: CPT

## 2017-10-25 PROCEDURE — 82550 ASSAY OF CK (CPK): CPT

## 2017-10-25 PROCEDURE — 82803 BLOOD GASES ANY COMBINATION: CPT

## 2017-10-25 PROCEDURE — 83605 ASSAY OF LACTIC ACID: CPT

## 2017-10-25 PROCEDURE — 87798 DETECT AGENT NOS DNA AMP: CPT

## 2017-10-25 PROCEDURE — 99233 SBSQ HOSP IP/OBS HIGH 50: CPT | Mod: GC

## 2017-10-25 PROCEDURE — 84484 ASSAY OF TROPONIN QUANT: CPT

## 2017-10-25 PROCEDURE — 83735 ASSAY OF MAGNESIUM: CPT

## 2017-10-25 PROCEDURE — 94640 AIRWAY INHALATION TREATMENT: CPT

## 2017-10-25 PROCEDURE — 87040 BLOOD CULTURE FOR BACTERIA: CPT

## 2017-10-25 PROCEDURE — 87633 RESP VIRUS 12-25 TARGETS: CPT

## 2017-10-25 PROCEDURE — 71045 X-RAY EXAM CHEST 1 VIEW: CPT

## 2017-10-25 PROCEDURE — 84100 ASSAY OF PHOSPHORUS: CPT

## 2017-10-25 PROCEDURE — C8929: CPT

## 2017-10-25 PROCEDURE — 87581 M.PNEUMON DNA AMP PROBE: CPT

## 2017-10-25 PROCEDURE — 85027 COMPLETE CBC AUTOMATED: CPT

## 2017-10-25 PROCEDURE — 82553 CREATINE MB FRACTION: CPT

## 2017-10-25 PROCEDURE — 84132 ASSAY OF SERUM POTASSIUM: CPT

## 2017-10-25 PROCEDURE — 80053 COMPREHEN METABOLIC PANEL: CPT

## 2017-10-25 PROCEDURE — 99285 EMERGENCY DEPT VISIT HI MDM: CPT | Mod: 25

## 2017-10-25 PROCEDURE — 82565 ASSAY OF CREATININE: CPT

## 2017-10-25 PROCEDURE — 99239 HOSP IP/OBS DSCHRG MGMT >30: CPT

## 2017-10-25 PROCEDURE — 71275 CT ANGIOGRAPHY CHEST: CPT

## 2017-10-25 PROCEDURE — 96375 TX/PRO/DX INJ NEW DRUG ADDON: CPT | Mod: XU

## 2017-10-25 PROCEDURE — 84295 ASSAY OF SERUM SODIUM: CPT

## 2017-10-25 PROCEDURE — 82947 ASSAY GLUCOSE BLOOD QUANT: CPT

## 2017-10-25 PROCEDURE — 80048 BASIC METABOLIC PNL TOTAL CA: CPT

## 2017-10-25 PROCEDURE — 96374 THER/PROPH/DIAG INJ IV PUSH: CPT | Mod: XU

## 2017-10-25 PROCEDURE — 87486 CHLMYD PNEUM DNA AMP PROBE: CPT

## 2017-10-25 PROCEDURE — 82330 ASSAY OF CALCIUM: CPT

## 2017-10-25 PROCEDURE — 85610 PROTHROMBIN TIME: CPT

## 2017-10-25 PROCEDURE — 83880 ASSAY OF NATRIURETIC PEPTIDE: CPT

## 2017-10-25 PROCEDURE — 85730 THROMBOPLASTIN TIME PARTIAL: CPT

## 2017-10-25 PROCEDURE — 82435 ASSAY OF BLOOD CHLORIDE: CPT

## 2017-10-25 PROCEDURE — 94660 CPAP INITIATION&MGMT: CPT

## 2017-10-25 PROCEDURE — 85014 HEMATOCRIT: CPT

## 2017-10-25 RX ORDER — FUROSEMIDE 40 MG
20 TABLET ORAL ONCE
Qty: 0 | Refills: 0 | Status: COMPLETED | OUTPATIENT
Start: 2017-10-25 | End: 2017-10-25

## 2017-10-25 RX ORDER — RIVAROXABAN 15 MG-20MG
1 KIT ORAL
Qty: 60 | Refills: 5 | OUTPATIENT
Start: 2017-10-25 | End: 2018-04-22

## 2017-10-25 RX ORDER — ACETAMINOPHEN 500 MG
650 TABLET ORAL ONCE
Qty: 0 | Refills: 0 | Status: COMPLETED | OUTPATIENT
Start: 2017-10-25 | End: 2017-10-25

## 2017-10-25 RX ORDER — METOPROLOL TARTRATE 50 MG
50 TABLET ORAL
Qty: 0 | Refills: 0 | Status: DISCONTINUED | OUTPATIENT
Start: 2017-10-25 | End: 2017-10-25

## 2017-10-25 RX ORDER — FUROSEMIDE 40 MG
20 TABLET ORAL
Qty: 0 | Refills: 0 | COMMUNITY

## 2017-10-25 RX ORDER — POTASSIUM CHLORIDE 20 MEQ
40 PACKET (EA) ORAL ONCE
Qty: 0 | Refills: 0 | Status: COMPLETED | OUTPATIENT
Start: 2017-10-25 | End: 2017-10-25

## 2017-10-25 RX ORDER — INDAPAMIDE 1.25 MG
1 TABLET ORAL
Qty: 0 | Refills: 0 | COMMUNITY

## 2017-10-25 RX ADMIN — Medication 1 APPLICATION(S): at 05:05

## 2017-10-25 RX ADMIN — Medication 20 MILLIGRAM(S): at 11:38

## 2017-10-25 RX ADMIN — Medication 650 MILLIGRAM(S): at 10:14

## 2017-10-25 RX ADMIN — Medication 50 MILLIGRAM(S): at 17:14

## 2017-10-25 RX ADMIN — Medication 50 MILLIGRAM(S): at 11:08

## 2017-10-25 RX ADMIN — Medication 650 MILLIGRAM(S): at 11:10

## 2017-10-25 RX ADMIN — Medication 3 MILLILITER(S): at 17:15

## 2017-10-25 RX ADMIN — Medication 40 MILLIEQUIVALENT(S): at 08:23

## 2017-10-25 RX ADMIN — RIVAROXABAN 15 MILLIGRAM(S): KIT at 05:04

## 2017-10-25 RX ADMIN — Medication 1 APPLICATION(S): at 17:15

## 2017-10-25 RX ADMIN — Medication 3 MILLILITER(S): at 11:08

## 2017-10-25 RX ADMIN — RIVAROXABAN 15 MILLIGRAM(S): KIT at 17:14

## 2017-10-25 RX ADMIN — Medication 3 MILLILITER(S): at 05:05

## 2017-10-25 RX ADMIN — BUDESONIDE AND FORMOTEROL FUMARATE DIHYDRATE 2 PUFF(S): 160; 4.5 AEROSOL RESPIRATORY (INHALATION) at 05:04

## 2017-10-25 RX ADMIN — BUDESONIDE AND FORMOTEROL FUMARATE DIHYDRATE 2 PUFF(S): 160; 4.5 AEROSOL RESPIRATORY (INHALATION) at 17:15

## 2017-10-25 NOTE — PROGRESS NOTE ADULT - SUBJECTIVE AND OBJECTIVE BOX
Subjective:    Patient is a 57y old  Female who presents with a chief complaint of SOB, cough (22 Oct 2017 13:35) - no events overnight    MEDICATIONS  (STANDING):  ALBUTerol/ipratropium for Nebulization 3 milliLiter(s) Nebulizer every 6 hours  buDESOnide  80 MICROgram(s)/formoterol 4.5 MICROgram(s) Inhaler 2 Puff(s) Inhalation two times a day  fluocinonide 0.05% Cream 1 Application(s) Topical two times a day  rivaroxaban 15 milliGRAM(s) Oral two times a day    MEDICATIONS  (PRN):  nystatin Cream 1 Application(s) Topical every 6 hours PRN Rash  petrolatum white Ointment 1 Application(s) Topical daily PRN Dry lips        Objective:    Vitals: Vital Signs Last 24 Hrs  T(C): 36.9 (10-25-17 @ 04:25), Max: 37.1 (10-24-17 @ 08:30)  T(F): 98.4 (10-25-17 @ 04:25), Max: 98.8 (10-24-17 @ 08:30)  HR: 104 (10-25-17 @ 06:42) (94 - 106)  BP: 150/78 (10-25-17 @ 04:25) (136/79 - 150/78)  BP(mean): --  RR: 18 (10-25-17 @ 04:25) (18 - 19)  SpO2: 95% (10-25-17 @ 06:42) (76% - 98%)            I&O's Summary    24 Oct 2017 07:01  -  25 Oct 2017 07:00  --------------------------------------------------------  IN: 910 mL / OUT: 800 mL / NET: 110 mL    PHYSICAL EXAM:  GENERAL: NAD, well-groomed, well-developed  HEAD:  Atraumatic, Normocephalic  CHEST/LUNG: Clear to percussion bilaterally; No rales, rhonchi, wheezing, or rubs  HEART: Regular rate and rhythm; No murmurs, rubs, or gallops  ABDOMEN: Soft, Nontender, Nondistended; Bowel sounds present                                       LABS:  10-24    142  |  89<L>  |  14  ----------------------------<  131<H>  3.6   |  41<H>  |  0.71  10-23    140  |  88<L>  |  14  ----------------------------<  121<H>  3.5   |  39<H>  |  0.71  10-22    142  |  90<L>  |  18  ----------------------------<  126<H>  3.5   |  41<H>  |  0.75    Ca    9.2      24 Oct 2017 07:28  Ca    9.6      23 Oct 2017 07:22  Ca    9.4      22 Oct 2017 08:32  Phos  3.0     10-24  Mg     2.1     10-24    TPro  7.8  /  Alb  3.4  /  TBili  1.1  /  DBili  x   /  AST  46<H>  /  ALT  29  /  AlkPhos  62  10-24                      ABG - ( 24 Oct 2017 12:10 )  pH: 7.53  /  pCO2: 51    /  pO2: 95    / HCO3: 43    / Base Excess: 17.0  /  SaO2: 98                            14.1   9.34  )-----------( 240      ( 24 Oct 2017 07:26 )             48.9                         13.5   10.46 )-----------( 247      ( 23 Oct 2017 07:27 )             46.2                         13.7   9.80  )-----------( 259      ( 22 Oct 2017 08:32 )             47.8

## 2017-10-25 NOTE — PROGRESS NOTE ADULT - PROBLEM SELECTOR PLAN 1
Suspect hypoxia 2/2 to pulmonary edema and PE and hypercarbia from non-compliance with CPAP at home vs. asthma exacerbation   - O2 downtitrated today and pt saturating low 90's on RA however, pt desaturates on ambulation with improvement in sat when placed on nasal canula  - IV lasix BID discontinued as pt appears euvolemic and is lab work with increasing bicarb  -on xarelto for PE - lifelong

## 2017-10-25 NOTE — PROGRESS NOTE ADULT - PROBLEM SELECTOR PROBLEM 2
Other acute pulmonary embolism without acute cor pulmonale

## 2017-10-25 NOTE — PROGRESS NOTE ADULT - PROVIDER SPECIALTY LIST ADULT
Internal Medicine
Pulmonology
Internal Medicine
Internal Medicine

## 2017-10-25 NOTE — PROGRESS NOTE ADULT - SUBJECTIVE AND OBJECTIVE BOX
OBJECTIVE:  ICU Vital Signs Last 24 Hrs  T(C): 36.9 (25 Oct 2017 04:25), Max: 37.1 (24 Oct 2017 08:30)  T(F): 98.4 (25 Oct 2017 04:25), Max: 98.8 (24 Oct 2017 08:30)  HR: 104 (25 Oct 2017 06:42) (94 - 106)  BP: 150/78 (25 Oct 2017 04:25) (136/79 - 150/78)  BP(mean): --  ABP: --  ABP(mean): --  RR: 18 (25 Oct 2017 04:25) (18 - 19)  SpO2: 95% (25 Oct 2017 06:42) (76% - 98%)        10-24 @ 07:01  -  10-25 @ 07:00  --------------------------------------------------------  IN: 910 mL / OUT: 800 mL / NET: 110 mL      CAPILLARY BLOOD GLUCOSE          PHYSICAL EXAM:  General:   HEENT:   Respiratory:   Cardiovascular:   Abdomen:   Extremities:   Skin:   Neurological:    HOSPITAL MEDICATIONS:  rivaroxaban 15 milliGRAM(s) Oral two times a day          ALBUTerol/ipratropium for Nebulization 3 milliLiter(s) Nebulizer every 6 hours  buDESOnide  80 MICROgram(s)/formoterol 4.5 MICROgram(s) Inhaler 2 Puff(s) Inhalation two times a day                fluocinonide 0.05% Cream 1 Application(s) Topical two times a day  nystatin Cream 1 Application(s) Topical every 6 hours PRN  petrolatum white Ointment 1 Application(s) Topical daily PRN        LABS:                        14.1   9.34  )-----------( 240      ( 24 Oct 2017 07:26 )             48.9     Hgb Trend: 14.1<--, 13.5<--, 13.7<--, 13.2<--, 13.9<--  10-24    142  |  89<L>  |  14  ----------------------------<  131<H>  3.6   |  41<H>  |  0.71    Ca    9.2      24 Oct 2017 07:28  Phos  3.0     10-24  Mg     2.1     10-24    TPro  7.8  /  Alb  3.4  /  TBili  1.1  /  DBili  x   /  AST  46<H>  /  ALT  29  /  AlkPhos  62  10-24    Creatinine Trend: 0.71<--, 0.71<--, 0.75<--, 0.76<--, 0.82<--, 0.87<--      Arterial Blood Gas:  10-24 @ 12:10  7.53/51/95/43/98/17.0  ABG lactate: --        MICROBIOLOGY:     RADIOLOGY:  [ ] Reviewed and interpreted by me    PULMONARY FUNCTION TESTS: Pt seen and examined by the bedside. No acute events overnight. Eating breakfast. Breathing better. No longer having muscle cramps. Denies fevers, chills, chest pain, SOB, abdominal pain, nausea/vomiting, diarrhea, worsening b/l LE edema. Pt states that she will be discharged later today.    OBJECTIVE:  ICU Vital Signs Last 24 Hrs  T(C): 36.9 (25 Oct 2017 04:25), Max: 37.1 (24 Oct 2017 08:30)  T(F): 98.4 (25 Oct 2017 04:25), Max: 98.8 (24 Oct 2017 08:30)  HR: 104 (25 Oct 2017 06:42) (94 - 106)  BP: 150/78 (25 Oct 2017 04:25) (136/79 - 150/78)  BP(mean): --  ABP: --  ABP(mean): --  RR: 18 (25 Oct 2017 04:25) (18 - 19)  SpO2: 95% (25 Oct 2017 06:42) (76% - 98%)        10-24 @ 07:01  -  10-25 @ 07:00  --------------------------------------------------------  IN: 910 mL / OUT: 800 mL / NET: 110 mL        PHYSICAL EXAM:  General: obese, NAD  HEENT: NCAT, moist mucosal membranes  Respiratory: CTAB, though difficult 2/2 body habitus  Cardiovascular: S1/S2 normal, tachycardiac, regular rhythm, no murmurs/rubs/gallops appreciated  Abdomen: soft, nontender, nondistended  Extremities: 1+ pitting edema b/l LE  Skin: warm/dry  Neurological: AAOx3, answering questions appropriately; no focal deficits    HOSPITAL MEDICATIONS:  rivaroxaban 15 milliGRAM(s) Oral two times a day    ALBUTerol/ipratropium for Nebulization 3 milliLiter(s) Nebulizer every 6 hours  buDESOnide  80 MICROgram(s)/formoterol 4.5 MICROgram(s) Inhaler 2 Puff(s) Inhalation two times a day      fluocinonide 0.05% Cream 1 Application(s) Topical two times a day  nystatin Cream 1 Application(s) Topical every 6 hours PRN  petrolatum white Ointment 1 Application(s) Topical daily PRN        LABS:                        14.1   9.34  )-----------( 240      ( 24 Oct 2017 07:26 )             48.9     Hgb Trend: 14.1<--, 13.5<--, 13.7<--, 13.2<--, 13.9<--  10-24    142  |  89<L>  |  14  ----------------------------<  131<H>  3.6   |  41<H>  |  0.71    Ca    9.2      24 Oct 2017 07:28  Phos  3.0     10-24  Mg     2.1     10-24    TPro  7.8  /  Alb  3.4  /  TBili  1.1  /  DBili  x   /  AST  46<H>  /  ALT  29  /  AlkPhos  62  10-24    Creatinine Trend: 0.71<--, 0.71<--, 0.75<--, 0.76<--, 0.82<--, 0.87<--      Arterial Blood Gas:  10-24 @ 12:10  7.53/51/95/43/98/17.0  ABG lactate: --

## 2017-10-25 NOTE — PROGRESS NOTE ADULT - PROBLEM SELECTOR PLAN 5
- At goal.   - Consider restarting home medications metoprolol,  amlodipine as hemodynamics have improved

## 2017-10-25 NOTE — PROGRESS NOTE ADULT - ATTENDING COMMENTS
57 year old woman with morbid obesity, ishan possibe OHS and history of DVT treated in the past presents with acute hypercapneic respiratory failure, likely acute on chronic.  She has been treated with antibioitcs, diuresed and is on Xarelto for subsegmental PE noted on CTPA.  That CT showed areas of atelectasis and consolidation bilaterally.  She appears euvolemic now and is developing a metabolic alkalosis.  Would hold diuretics for now until HCO3  decreases.  REplete KCL.  Please send ABG so we can check ph and assess pCO2.  Patient will require follow up CT in 6 weeks to document resolution of lung opacities and CPAP titration trial as outpatient.
Ms. Hamilton is complaining of severe cramp in hand today.  she remains alkalotic with borderline low potassium and hypochloremia.  Clinically she does not appear volume overloaded at this time.  By US exam lungs are dry without evidence of alveolar edema or pleural effusions.  By report echo is normal.  would stop diuresis for a few days until alkalosis resolves.  Replete KCL and check arterial blood gas.  Need to know what pCO2 is and pH and patient may require diamox.  CPAP at bedsisde from home being examined by [employer].
Ms. Hamilton is improved clinically and will be discharged today.  She is using CPAP.  Received additional lasix today but agree that it should be held for a few days as she continues to have a metabolic alkalosis.  she will follow up with me  and I will repeat Chest CT in a few weeks.
Dyspnea likely multifactorial given AARON, subsegmental PE and airway hyperreactivity. Pt saturating in mid 90's on RA however still desaturates on exertion. Feels symptomatically improved. Beta blocker reinitiated today. Will require repeat CT chest in 4-6 weeks as well as repeat outpatient sleep study. This was verbalized to patient with pt understanding. Pt with scheduled outpatient follow up on 10/30 at 12 pm. Pt medically stable for discharge home with supplemental oxygen.     Discharge time spent >30 minutes.
acute on chronic hypercapnic and hypoxic respiratory failure now improving, off bipap and nasal cannula  acute pulmonary edema, concern for underlying CHF. continue IV lasix 40 big, check TTE  ?LLL opacity- will continue azithromycin to cover CAP per pulm recs. monitor leukocytosis. monitor for fevers  PE, started xarelto. pt will need lifelong AC (hx of prior dvt)  off steroids at this time  continue duonebs, symbicort  PT consult .
acute on chronic hypercapnic and hypoxic respiratory failure on bipap, titrating down FIO2  pulmonary edema, concern for underlying CHF. continue IV lasix 40 big, check TTE  ?LLL opacity- will continue azithromycin to cover CAP per pulm recs. monitor leukocytosis. monitor for fevers  PE, started xarelto. pt will need lifelong AC (hx of prior dvt)  off steroids at this time  continue duonebs, symbicort  PT consult
Pending echo  Will decrease IV lasix in AM  Will require outpt CPAP set up and likely home O2 set up  Will require outpt pulm follow up/ sleep study

## 2017-10-25 NOTE — PROGRESS NOTE ADULT - PROBLEM SELECTOR PROBLEM 1
Acute on chronic respiratory failure with hypoxia and hypercapnia

## 2017-10-25 NOTE — PROGRESS NOTE ADULT - PROBLEM SELECTOR PROBLEM 7
Advanced care planning/counseling discussion

## 2017-10-25 NOTE — PROGRESS NOTE ADULT - ASSESSMENT
----------------------------------------  Jessi Talley MD PGY-4  Pulmonary/Critical Care Fellow  Pager # [phone] (NS), [phone] ([facility]) 57F hx AARON on CPAP, provoked DVT (2012) prev on coumadin, asthma, DM2, obesity p/w cough and dyspnea.     #cough/dyspnea, hypercapneic resp failure  Unclear etiology of cough/dyspnea, though on imaging appears to have b/l upper lobe GGO and interlobular septal thickening. Subseg PE also noted though this likely is not contributor as it appears to be small. Initially unclear if due to PNA vs. fluid overload. TTE 60-65% and no diastolic dysfunction. Pt has been diuresed though now has become more alkalotic. Most recent ABG showing metabolic alkalosis with resp compensation. Most likely underlying AARON/OHS also contributing to hypercapneia.   - will require CT chest in 4-6 weeks to assess for resolution of RLL nodule  - would continue to hold lasix for next 24 hours given rising bicarb  - would replete K > 4, Mg > 2  - cont bilevel at night (eval'ed by biomed yesterday)  - cont supplemental O2, though would titrate down as tolerated with O2 Sat > 88%  - completed abx for 5 days total for possible underlying PNA  - will need repeat sleep study as outpatient    #PE  Subsegmental PEs noted on CTPA  - cont NOAC: xarelto      ----------------------------------------  Jessi Talley MD PGY-4  Pulmonary/Critical Care Fellow  Pager # [phone] (NS), [phone] ([facility]) 57F hx AARON on CPAP, provoked DVT (2012) prev on coumadin, asthma, DM2, obesity p/w cough and dyspnea.     #cough/dyspnea, hypercapneic resp failure  Unclear etiology of cough/dyspnea, though on imaging appears to have b/l upper lobe GGO and interlobular septal thickening. Subseg PE also noted though this likely is not contributor as it appears to be small. Initially unclear if due to PNA vs. fluid overload. TTE 60-65% and no diastolic dysfunction. Pt has been diuresed though now has become more alkalotic. Most recent ABG showing metabolic alkalosis with resp compensation. Most likely underlying AARON/OHS also contributing to hypercapneia.   - will require CT chest in 4-6 weeks to assess for resolution of RLL nodule  - would continue to hold lasix until evaluated by PMD, unless significant leg swelling  - recommend leg elevation and compression stockings for b/l LE edema  - would replete K > 4, Mg > 2  - cont bilevel at night (eval'ed by biomed yesterday)  - cont supplemental O2, though would titrate down as tolerated with O2 Sat > 88%  - completed abx for 5 days total for possible underlying PNA  - will need repeat sleep study as outpatient    #PE  Subsegmental PEs noted on CTPA  - cont NOAC: xarelto      ----------------------------------------  Jessi Talley MD PGY-4  Pulmonary/Critical Care Fellow  Pager # [phone] (NS), [phone] ([facility])

## 2017-10-25 NOTE — PROGRESS NOTE ADULT - PROBLEM SELECTOR PROBLEM 4
Asthma, unspecified asthma severity, unspecified whether complicated, unspecified whether persistent

## 2017-10-25 NOTE — PROGRESS NOTE ADULT - PROBLEM SELECTOR PLAN 2
- c/w xarelto  - sinus tachycardia on monitor, will observe on tele  - ABG shows decrease in CO2  - Pulm following

## 2017-10-25 NOTE — PROGRESS NOTE ADULT - PROBLEM SELECTOR PROBLEM 3
Acute pulmonary edema

## 2017-10-25 NOTE — PROGRESS NOTE ADULT - PROBLEM SELECTOR PLAN 7
-Patient has good insight into medical condition. Full code.

## 2017-10-25 NOTE — PROGRESS NOTE ADULT - PROBLEM SELECTOR PLAN 3
suspect 2/2 acute heart failure possibly on top of chronic with pHTN due to AARON and asthma. Resolved  - Hold off on further diuresis  -strict i/o, daily weights + low salt diet

## 2017-12-14 ENCOUNTER — APPOINTMENT (OUTPATIENT)
Dept: PULMONOLOGY | Facility: CLINIC | Age: 57
End: 2017-12-14
Payer: COMMERCIAL

## 2017-12-14 VITALS
SYSTOLIC BLOOD PRESSURE: 160 MMHG | WEIGHT: 262 LBS | DIASTOLIC BLOOD PRESSURE: 94 MMHG | HEART RATE: 86 BPM | HEIGHT: 59 IN | BODY MASS INDEX: 52.82 KG/M2 | TEMPERATURE: 99.2 F | RESPIRATION RATE: 18 BRPM

## 2017-12-14 VITALS — WEIGHT: 262 LBS | HEIGHT: 59.06 IN | BODY MASS INDEX: 52.82 KG/M2

## 2017-12-14 DIAGNOSIS — G47.33 OBSTRUCTIVE SLEEP APNEA (ADULT) (PEDIATRIC): ICD-10-CM

## 2017-12-14 DIAGNOSIS — Z99.89 OBSTRUCTIVE SLEEP APNEA (ADULT) (PEDIATRIC): ICD-10-CM

## 2017-12-14 PROCEDURE — 99215 OFFICE O/P EST HI 40 MIN: CPT | Mod: 25

## 2017-12-14 PROCEDURE — ZZZZZ: CPT

## 2017-12-14 PROCEDURE — 94726 PLETHYSMOGRAPHY LUNG VOLUMES: CPT

## 2017-12-14 PROCEDURE — 94729 DIFFUSING CAPACITY: CPT

## 2017-12-14 PROCEDURE — 94060 EVALUATION OF WHEEZING: CPT

## 2017-12-14 RX ORDER — ALBUTEROL SULFATE 90 UG/1
108 (90 BASE) AEROSOL, METERED RESPIRATORY (INHALATION)
Qty: 1 | Refills: 5 | Status: ACTIVE | COMMUNITY
Start: 2017-12-14 | End: 1900-01-01

## 2017-12-14 RX ORDER — BUDESONIDE AND FORMOTEROL FUMARATE DIHYDRATE 80; 4.5 UG/1; UG/1
80-4.5 AEROSOL RESPIRATORY (INHALATION) TWICE DAILY
Qty: 1 | Refills: 5 | Status: ACTIVE | COMMUNITY
Start: 2017-12-14 | End: 1900-01-01

## 2018-01-25 ENCOUNTER — APPOINTMENT (OUTPATIENT)
Dept: CARDIOLOGY | Facility: CLINIC | Age: 58
End: 2018-01-25

## 2018-02-07 NOTE — PATIENT PROFILE ADULT. - FUNCTIONAL SCREEN CURRENT LEVEL: TRANSFERRING, MLM
The patient has been re-examined and I agree with the above assessment or I updated with my findings. (3) assistive equipment and person

## 2018-03-13 ENCOUNTER — OUTPATIENT (OUTPATIENT)
Dept: OUTPATIENT SERVICES | Facility: HOSPITAL | Age: 58
LOS: 1 days | End: 2018-03-13
Payer: COMMERCIAL

## 2018-03-13 ENCOUNTER — APPOINTMENT (OUTPATIENT)
Dept: CT IMAGING | Facility: IMAGING CENTER | Age: 58
End: 2018-03-13
Payer: COMMERCIAL

## 2018-03-13 DIAGNOSIS — Z00.8 ENCOUNTER FOR OTHER GENERAL EXAMINATION: ICD-10-CM

## 2018-03-13 DIAGNOSIS — Z98.89 OTHER SPECIFIED POSTPROCEDURAL STATES: Chronic | ICD-10-CM

## 2018-03-13 PROCEDURE — 82565 ASSAY OF CREATININE: CPT

## 2018-03-13 PROCEDURE — 74177 CT ABD & PELVIS W/CONTRAST: CPT

## 2018-03-13 PROCEDURE — 74177 CT ABD & PELVIS W/CONTRAST: CPT | Mod: 26

## 2018-08-15 PROBLEM — J98.2 INTERSTITIAL EMPHYSEMA: Chronic | Status: ACTIVE | Noted: 2017-10-18

## 2018-08-16 ENCOUNTER — APPOINTMENT (OUTPATIENT)
Dept: CT IMAGING | Facility: IMAGING CENTER | Age: 58
End: 2018-08-16
Payer: COMMERCIAL

## 2018-08-16 ENCOUNTER — OUTPATIENT (OUTPATIENT)
Dept: OUTPATIENT SERVICES | Facility: HOSPITAL | Age: 58
LOS: 1 days | End: 2018-08-16
Payer: COMMERCIAL

## 2018-08-16 DIAGNOSIS — Z00.8 ENCOUNTER FOR OTHER GENERAL EXAMINATION: ICD-10-CM

## 2018-08-16 DIAGNOSIS — Z98.89 OTHER SPECIFIED POSTPROCEDURAL STATES: Chronic | ICD-10-CM

## 2018-08-16 PROCEDURE — 82565 ASSAY OF CREATININE: CPT

## 2018-08-16 PROCEDURE — 74177 CT ABD & PELVIS W/CONTRAST: CPT | Mod: 26

## 2018-08-16 PROCEDURE — 74177 CT ABD & PELVIS W/CONTRAST: CPT

## 2018-08-18 ENCOUNTER — OUTPATIENT (OUTPATIENT)
Dept: OUTPATIENT SERVICES | Facility: HOSPITAL | Age: 58
LOS: 1 days | End: 2018-08-18
Payer: COMMERCIAL

## 2018-08-18 ENCOUNTER — APPOINTMENT (OUTPATIENT)
Dept: SLEEP CENTER | Facility: CLINIC | Age: 58
End: 2018-08-18
Payer: COMMERCIAL

## 2018-08-18 DIAGNOSIS — Z98.89 OTHER SPECIFIED POSTPROCEDURAL STATES: Chronic | ICD-10-CM

## 2018-08-18 PROCEDURE — 95810 POLYSOM 6/> YRS 4/> PARAM: CPT

## 2018-08-18 PROCEDURE — 95810 POLYSOM 6/> YRS 4/> PARAM: CPT | Mod: 26

## 2018-08-20 DIAGNOSIS — G47.33 OBSTRUCTIVE SLEEP APNEA (ADULT) (PEDIATRIC): ICD-10-CM

## 2018-09-06 ENCOUNTER — RESULT REVIEW (OUTPATIENT)
Age: 58
End: 2018-09-06

## 2018-09-25 ENCOUNTER — APPOINTMENT (OUTPATIENT)
Dept: PULMONOLOGY | Facility: CLINIC | Age: 58
End: 2018-09-25
Payer: COMMERCIAL

## 2018-09-25 VITALS
OXYGEN SATURATION: 95 % | DIASTOLIC BLOOD PRESSURE: 82 MMHG | RESPIRATION RATE: 16 BRPM | TEMPERATURE: 98 F | HEIGHT: 59 IN | BODY MASS INDEX: 50.4 KG/M2 | SYSTOLIC BLOOD PRESSURE: 129 MMHG | WEIGHT: 250 LBS | HEART RATE: 66 BPM

## 2018-09-25 DIAGNOSIS — Z87.09 PERSONAL HISTORY OF OTHER DISEASES OF THE RESPIRATORY SYSTEM: ICD-10-CM

## 2018-09-25 PROCEDURE — 99215 OFFICE O/P EST HI 40 MIN: CPT

## 2018-09-27 PROBLEM — Z87.09 HISTORY OF ASTHMA: Status: ACTIVE | Noted: 2017-12-14

## 2018-09-28 ENCOUNTER — OTHER (OUTPATIENT)
Age: 58
End: 2018-09-28

## 2018-12-06 ENCOUNTER — APPOINTMENT (OUTPATIENT)
Dept: PULMONOLOGY | Facility: CLINIC | Age: 58
End: 2018-12-06

## 2018-12-07 NOTE — PHYSICAL THERAPY INITIAL EVALUATION ADULT - DIAGNOSIS, PT EVAL
Telephone Encounter by Akanksha Pascal CMA at 07/10/17 09:01 AM     Author:  Akanksha Pascal CMA Service:  (none) Author Type:  Certified Medical Assistant     Filed:  07/10/17 09:01 AM Encounter Date:  7/10/2017 Status:  Signed     :  Akanksha Pascal CMA (Certified Medical Assistant)       From: Agustin Jeffrey  To: Barbie Gutierrez PA-C  Sent: 7/10/2017  6:23 AM CDT  Subject: Other    I apologize for canceling appointment so late!    I've had difficulty walking since late last week. I've had severe pain in   my right knee making it difficult to go to immediate care    Thank you for your patience!       Revision History        Date/Time User Provider Type Action    > 07/10/17 09:01 AM Akanksha Pascal CMA Certified Medical Assistant Sign    Attribution information within the note text is not available.            
Pt. presents with decreased ambulation and decreased endurance

## 2019-01-15 ENCOUNTER — APPOINTMENT (OUTPATIENT)
Dept: PULMONOLOGY | Facility: CLINIC | Age: 59
End: 2019-01-15

## 2019-02-19 ENCOUNTER — APPOINTMENT (OUTPATIENT)
Dept: PULMONOLOGY | Facility: CLINIC | Age: 59
End: 2019-02-19
Payer: COMMERCIAL

## 2019-02-19 VITALS
BODY MASS INDEX: 50.4 KG/M2 | HEIGHT: 59 IN | RESPIRATION RATE: 16 BRPM | WEIGHT: 250 LBS | DIASTOLIC BLOOD PRESSURE: 82 MMHG | TEMPERATURE: 97.7 F | SYSTOLIC BLOOD PRESSURE: 158 MMHG | HEART RATE: 71 BPM

## 2019-02-19 DIAGNOSIS — Z86.711 PERSONAL HISTORY OF PULMONARY EMBOLISM: ICD-10-CM

## 2019-02-19 DIAGNOSIS — E66.01 MORBID (SEVERE) OBESITY DUE TO EXCESS CALORIES: ICD-10-CM

## 2019-02-19 DIAGNOSIS — E66.2 MORBID (SEVERE) OBESITY WITH ALVEOLAR HYPOVENTILATION: ICD-10-CM

## 2019-02-19 PROCEDURE — 99214 OFFICE O/P EST MOD 30 MIN: CPT

## 2019-02-19 NOTE — END OF VISIT
[] : Fellow [FreeTextEntry3] : Patient remains on lovenox.  Need to clarify DVT/PE history as there have been at least two episodes.  NOt clear if these were provoked episodes and patient remains morbidly obese which is an ongoing risk factor for DVT.  Will discuss with patient at next visit.

## 2019-02-19 NOTE — HISTORY OF PRESENT ILLNESS
[FreeTextEntry1] : 58F hx morbid obesity, DVTs/PE (10/2017) on AC, OHS/AARON on bilevel, HTN, prior admission in 10/2017 for HF, subseg PE w/ RV dysfunction and hypercapnia, who comes for follow up visit. Last seen on 9/25/2018 for follow up visit and started on bilevel 16/9 for OHS, which helped normalize pCO2 during sleep study. Today pt feels great, states that she was able walk from her house to her doctors house without dyspnea and has been pushing herself. Currently using O2 24/7. Compliant with xarelto, symbicort and ventolin. Rarely uses ventolin. Uses bilevel every night. Denies fevers, chills, chest pain, SOB, abdominal pain, wheezing, cough, nausea/vomiting, diarrhea, worsening b/l LE edema. Desats to 90% with exertion. \par \par Of note, pt has 2 episodes of thrombosis. DVT in 2010 6 months after her knee replacement. She received AC. Then had a subsegmental PEs found during hospitalization in 10/2017, which appears to be unprovoked except for general immobility due to her dyspnea and obesity. \par \par CT Chest 10/2017: Limited due to respiratory motion. Bilateral lower lobe atelectasis. Bilateral upper lobe GGO and mild interlobular septal thickening. No pleural effusion or pneumothorax. Normal size thoracic aorta and main pulmonary artery. Central filling defect involving RML subsegmental branch. \par  PFTs 12/2017: no obstructive dz, restrictive lung disease likely from obesity with mildly decreased DLCO \par TTE 10/2017: normal sys/diastolic function, normal RA, LA, limited views of RV shows reduced sys function, no PASP \par PSG split 8/2018: TCO2 47-55 mmHg, bilevel 16/9, disordered breathing improved with NREM/REM sleep; full fask mask, weight 280; prior AARON was at outside sleep study

## 2019-02-19 NOTE — ASSESSMENT
[FreeTextEntry1] : 58F hx morbid obesity, DVTs/PE (10/2017) on AC, OHS/AARON on bilevel, HTN, prior admission in 10/2017 for HF, subseg PE w/ RV dysfunction and hypercapnia, who comes for follow up visit. Compliant with symbicort, ventolin,  and bilevel. Feels much improved in regards to dyspnea, which is likely due to bilevel treating underlying OHS in turn helping with hypoxia. Compliance report of bilevel showing 100% usage in the last 30 days with 97% over 4 hours (average use is 6 hours). \par - cont xarelto for AC, will likely need lifelong because she has had 2 episodes (last PE was unprovoked)\par - cont bilevel\par - cont symbicort, ventolin\par \par Case d/w Dr. Brar

## 2019-02-19 NOTE — PHYSICAL EXAM
[General Appearance - Well Developed] : well developed [General Appearance - Well Nourished] : well nourished [Normal Conjunctiva] : the conjunctiva exhibited no abnormalities [Neck Appearance] : the appearance of the neck was normal [Heart Rate And Rhythm] : heart rate and rhythm were normal [Heart Sounds] : normal S1 and S2 [Murmurs] : no murmurs present [Exaggerated Use Of Accessory Muscles For Inspiration] : no accessory muscle use [Auscultation Breath Sounds / Voice Sounds] : lungs were clear to auscultation bilaterally [Bowel Sounds] : normal bowel sounds [Abdomen Soft] : soft [Abdomen Tenderness] : non-tender [Abnormal Walk] : normal gait [Nail Clubbing] : no clubbing of the fingernails [Cyanosis, Localized] : no localized cyanosis [Skin Turgor] : normal skin turgor [] : no rash [Motor Exam] : the motor exam was normal [No Focal Deficits] : no focal deficits [Oriented To Time, Place, And Person] : oriented to person, place, and time [Mood] : the mood was normal

## 2019-03-04 NOTE — PATIENT PROFILE ADULT. - NSTOBACCONEVERSMOKERY/N_GEN_A
No Cheek-To-Nose Interpolation Flap Text: A decision was made to reconstruct the defect utilizing an interpolation axial flap and a staged reconstruction.  A telfa template was made of the defect.  This telfa template was then used to outline the Cheek-To-Nose Interpolation flap.  The donor area for the pedicle flap was then injected with anesthesia.  The flap was excised through the skin and subcutaneous tissue down to the layer of the underlying musculature.  The interpolation flap was carefully excised within this deep plane to maintain its blood supply.  The edges of the donor site were undermined.   The donor site was closed in a primary fashion.  The pedicle was then rotated into position and sutured.  Once the tube was sutured into place, adequate blood supply was confirmed with blanching and refill.  The pedicle was then wrapped with xeroform gauze and dressed appropriately with a telfa and gauze bandage to ensure continued blood supply and protect the attached pedicle.

## 2019-03-14 ENCOUNTER — OUTPATIENT (OUTPATIENT)
Dept: OUTPATIENT SERVICES | Facility: HOSPITAL | Age: 59
LOS: 1 days | End: 2019-03-14

## 2019-03-14 ENCOUNTER — APPOINTMENT (OUTPATIENT)
Dept: CV DIAGNOSITCS | Facility: HOSPITAL | Age: 59
End: 2019-03-14
Payer: COMMERCIAL

## 2019-03-14 DIAGNOSIS — Z86.711 PERSONAL HISTORY OF PULMONARY EMBOLISM: ICD-10-CM

## 2019-03-14 DIAGNOSIS — Z98.89 OTHER SPECIFIED POSTPROCEDURAL STATES: Chronic | ICD-10-CM

## 2019-03-14 PROCEDURE — 93306 TTE W/DOPPLER COMPLETE: CPT | Mod: 26

## 2019-03-29 NOTE — ED ADULT NURSE NOTE - NS ED NURSE REPORT GIVEN DT
"HPI:  Called Ms. Emilia Alan for hypertension follow-up. States that she cannot check blood pressure regularly because her daughter is not home often. The last readings were higher and taken right after evening medications and before "Rubi" was scheduled to go back to work. Also, noted that pharmacy sent PCP refill for the wrong strength of valsartan-hct.     Last 5 Patient Entered Readings                                      Current 30 Day Average: 143/72     Recent Readings 3/29/2019 3/23/2019 3/21/2019 3/19/2019 3/19/2019    SBP (mmHg) 117 152 168 128 156    DBP (mmHg) 64 74 71 82 76    Pulse 70 84 79 82 68          Patient denies s/s of hypotension (lightheadedness, dizziness, nausea, fatigue) associated with low readings. Instructed patient to inform me if this occurs, patient confirms understanding.    Patient denies s/s of hypertension (SOB, CP, severe headaches, changes in vision) associated with high readings. Instructed patient to go to the ED if BP >180/110 and accompanied by hypertensive s/s, patient confirms understanding.    Assessment:  Patient's current 30-day average is slightly above goal of <140/90 mmHg.     Plan:  Continue current regimen.  Sent correct prescription to patient's pharmacy.   Patients health , Richi Miller, will be following up every 3-4 weeks.   I will continue to monitor regularly and will follow-up in 5 weeks, sooner if blood pressure begins to trend upward or downward.     Current medication regimen:  Hypertension Medications             amLODIPine (NORVASC) 10 MG tablet Take 1 tablet (10 mg total) by mouth once daily.    hydrALAZINE (APRESOLINE) 50 MG tablet Take one tablet (50 mg) by mouth every morning and afternoon then two tablets (100 mg) every evening.    valsartan-hydrochlorothiazide (DIOVAN-HCT) 320-25 mg per tablet Take 1 tablet by mouth once daily.        Patient has my contact information and knows to call with any concerns or clinical " changes.       19-Oct-2017 02:14

## 2019-08-01 ENCOUNTER — APPOINTMENT (OUTPATIENT)
Dept: PULMONOLOGY | Facility: CLINIC | Age: 59
End: 2019-08-01

## 2019-08-05 NOTE — H&P ADULT - PMH
Called [phone] and spoke with mother. Went over laboratory testing showing very low depakote level. He is to have EEG routine and if it is abnormal I would consider transitioning him off Depakote and onto onfi.    Asthma  diagnosed 2010  on adbvair denies attacks  DVT (Deep Venous Thrombosis)  left leg 6 m s/p knee replacement in 2010  GERD (Gastroesophageal Reflux Disease)    HTN (Hypertension)    AARON (Obstructive Sleep Apnea)  category II pt uses c/pap pt to bering to hospital/  OR booking notified  Osteoarthritis of both knees, unspecified osteoarthritis type    Pneumomediastinum    Pneumonia  9-2010  Umbilical hernia  Reduciable

## 2019-09-12 ENCOUNTER — APPOINTMENT (OUTPATIENT)
Dept: PULMONOLOGY | Facility: CLINIC | Age: 59
End: 2019-09-12

## 2019-11-12 ENCOUNTER — APPOINTMENT (OUTPATIENT)
Dept: PULMONOLOGY | Facility: CLINIC | Age: 59
End: 2019-11-12

## 2020-03-19 ENCOUNTER — APPOINTMENT (OUTPATIENT)
Dept: PULMONOLOGY | Facility: CLINIC | Age: 60
End: 2020-03-19

## 2020-03-25 NOTE — DISCHARGE NOTE ADULT - NS AS DC FOLLOWUP INST YN
----- Message from Marvin Kelly MD sent at 3/25/2020  6:25 AM CDT -----  Please notify the patient of normal results.  Vit D improved. Follow-up as planned.  
Patient's wife, who has verbal consent, notified of lab results,  Dr. Kelly's review.  No changes at this time.  
Yes

## 2020-11-12 ENCOUNTER — APPOINTMENT (OUTPATIENT)
Dept: OPHTHALMOLOGY | Facility: CLINIC | Age: 60
End: 2020-11-12

## 2020-11-17 ENCOUNTER — INPATIENT (INPATIENT)
Facility: HOSPITAL | Age: 60
LOS: 22 days | Discharge: ANOTHER HOSPITAL | DRG: 870 | End: 2020-12-10
Attending: STUDENT IN AN ORGANIZED HEALTH CARE EDUCATION/TRAINING PROGRAM | Admitting: HOSPITALIST
Payer: COMMERCIAL

## 2020-11-17 VITALS
WEIGHT: 293 LBS | TEMPERATURE: 100 F | RESPIRATION RATE: 20 BRPM | HEART RATE: 105 BPM | DIASTOLIC BLOOD PRESSURE: 74 MMHG | SYSTOLIC BLOOD PRESSURE: 136 MMHG | HEIGHT: 60 IN | OXYGEN SATURATION: 94 %

## 2020-11-17 DIAGNOSIS — Z98.89 OTHER SPECIFIED POSTPROCEDURAL STATES: Chronic | ICD-10-CM

## 2020-11-17 LAB
ALBUMIN SERPL ELPH-MCNC: 3.9 G/DL — SIGNIFICANT CHANGE UP (ref 3.3–5)
ALP SERPL-CCNC: 50 U/L — SIGNIFICANT CHANGE UP (ref 40–120)
ALT FLD-CCNC: 42 U/L — SIGNIFICANT CHANGE UP (ref 10–45)
ANION GAP SERPL CALC-SCNC: 16 MMOL/L — SIGNIFICANT CHANGE UP (ref 5–17)
APTT BLD: 38.9 SEC — HIGH (ref 27.5–35.5)
AST SERPL-CCNC: 56 U/L — HIGH (ref 10–40)
BASOPHILS # BLD AUTO: 0.01 K/UL — SIGNIFICANT CHANGE UP (ref 0–0.2)
BASOPHILS NFR BLD AUTO: 0.2 % — SIGNIFICANT CHANGE UP (ref 0–2)
BILIRUB SERPL-MCNC: 0.5 MG/DL — SIGNIFICANT CHANGE UP (ref 0.2–1.2)
BUN SERPL-MCNC: 19 MG/DL — SIGNIFICANT CHANGE UP (ref 7–23)
CALCIUM SERPL-MCNC: 9 MG/DL — SIGNIFICANT CHANGE UP (ref 8.4–10.5)
CHLORIDE SERPL-SCNC: 102 MMOL/L — SIGNIFICANT CHANGE UP (ref 96–108)
CO2 SERPL-SCNC: 21 MMOL/L — LOW (ref 22–31)
CREAT SERPL-MCNC: 0.99 MG/DL — SIGNIFICANT CHANGE UP (ref 0.5–1.3)
EOSINOPHIL # BLD AUTO: 0 K/UL — SIGNIFICANT CHANGE UP (ref 0–0.5)
EOSINOPHIL NFR BLD AUTO: 0 % — SIGNIFICANT CHANGE UP (ref 0–6)
GLUCOSE SERPL-MCNC: 170 MG/DL — HIGH (ref 70–99)
HCT VFR BLD CALC: 42.6 % — SIGNIFICANT CHANGE UP (ref 34.5–45)
HGB BLD-MCNC: 13.9 G/DL — SIGNIFICANT CHANGE UP (ref 11.5–15.5)
IMM GRANULOCYTES NFR BLD AUTO: 0.6 % — SIGNIFICANT CHANGE UP (ref 0–1.5)
INR BLD: 1.96 RATIO — HIGH (ref 0.88–1.16)
LYMPHOCYTES # BLD AUTO: 0.95 K/UL — LOW (ref 1–3.3)
LYMPHOCYTES # BLD AUTO: 19.7 % — SIGNIFICANT CHANGE UP (ref 13–44)
MCHC RBC-ENTMCNC: 28.5 PG — SIGNIFICANT CHANGE UP (ref 27–34)
MCHC RBC-ENTMCNC: 32.6 GM/DL — SIGNIFICANT CHANGE UP (ref 32–36)
MCV RBC AUTO: 87.3 FL — SIGNIFICANT CHANGE UP (ref 80–100)
MONOCYTES # BLD AUTO: 0.31 K/UL — SIGNIFICANT CHANGE UP (ref 0–0.9)
MONOCYTES NFR BLD AUTO: 6.4 % — SIGNIFICANT CHANGE UP (ref 2–14)
NEUTROPHILS # BLD AUTO: 3.52 K/UL — SIGNIFICANT CHANGE UP (ref 1.8–7.4)
NEUTROPHILS NFR BLD AUTO: 73.1 % — SIGNIFICANT CHANGE UP (ref 43–77)
NRBC # BLD: 0 /100 WBCS — SIGNIFICANT CHANGE UP (ref 0–0)
NT-PROBNP SERPL-SCNC: 32 PG/ML — SIGNIFICANT CHANGE UP (ref 0–300)
PLATELET # BLD AUTO: 187 K/UL — SIGNIFICANT CHANGE UP (ref 150–400)
POTASSIUM SERPL-MCNC: 3.5 MMOL/L — SIGNIFICANT CHANGE UP (ref 3.5–5.3)
POTASSIUM SERPL-SCNC: 3.5 MMOL/L — SIGNIFICANT CHANGE UP (ref 3.5–5.3)
PROT SERPL-MCNC: 7.3 G/DL — SIGNIFICANT CHANGE UP (ref 6–8.3)
PROTHROM AB SERPL-ACNC: 22.8 SEC — HIGH (ref 10.6–13.6)
RBC # BLD: 4.88 M/UL — SIGNIFICANT CHANGE UP (ref 3.8–5.2)
RBC # FLD: 15.8 % — HIGH (ref 10.3–14.5)
SODIUM SERPL-SCNC: 139 MMOL/L — SIGNIFICANT CHANGE UP (ref 135–145)
TROPONIN T, HIGH SENSITIVITY RESULT: 11 NG/L — SIGNIFICANT CHANGE UP (ref 0–51)
WBC # BLD: 4.82 K/UL — SIGNIFICANT CHANGE UP (ref 3.8–10.5)
WBC # FLD AUTO: 4.82 K/UL — SIGNIFICANT CHANGE UP (ref 3.8–10.5)

## 2020-11-17 PROCEDURE — 99285 EMERGENCY DEPT VISIT HI MDM: CPT

## 2020-11-17 PROCEDURE — 93010 ELECTROCARDIOGRAM REPORT: CPT | Mod: NC

## 2020-11-17 NOTE — ED ADULT NURSE NOTE - NSIMPLEMENTINTERV_GEN_ALL_ED
Implemented All Fall with Harm Risk Interventions:  [place] to call system. Call bell, personal items and telephone within reach. Instruct patient to call for assistance. Room bathroom lighting operational. Non-slip footwear when patient is off stretcher. Physically safe environment: no spills, clutter or unnecessary equipment. Stretcher in lowest position, wheels locked, appropriate side rails in place. Provide visual cue, wrist band, yellow gown, etc. Monitor gait and stability. Monitor for mental status changes and reorient to person, place, and time. Review medications for side effects contributing to fall risk. Reinforce activity limits and safety measures with patient and family. Provide visual clues: red socks.

## 2020-11-17 NOTE — ED PROVIDER NOTE - ATTENDING CONTRIBUTION TO CARE
Pt on day 10 of illness with reported fever now, preceded with ongoing diarrhea and dry cough.  Pulse ox down to 86% at home, pt has chronic hypoxemia to 90-92% baseline since PE few years ago.  Pt appears slightly ill, no respiratory distress, improved on nasal cannula oxygen 4L.  Coarse bs b/l bases.  Concern for possible viral infection such as covid, pt now hypoxemic in typical timeframe for worsening of disease, will need admission.

## 2020-11-17 NOTE — ED PROVIDER NOTE - OBJECTIVE STATEMENT
59 yo F with pmhx HTN, DVT on xarelto, AARON (on CPAP) presents with 1 day fever (tmax of 104), 10ds of dry cough and diarrhea. Took 1g of tylenol at 630p. Has been taking dayquil and nyquil with relief for the cough. About 3-4 episodes of watery stool per day- no blood/black stools, no abdominal pain, n/v, lightheadedness/dizziness, able to tolerate po. States she used home O2 2-3 yrs ago - needed 3L at that time - stopped using it since she started CPAp at night. Measures 61 yo F with pmhx HTN, DVT on xarelto, AARON (on CPAP) presents with 1 day fever (tmax of 104), 10ds of dry cough and diarrhea. Took 1g of tylenol at 630p. Has been taking dayquil and nyquil with relief for the cough. About 3-4 episodes of watery stool per day- no blood/black stools, no abdominal pain, n/v, lightheadedness/dizziness, able to tolerate po. States she used home O2 2-3 yrs ago after she had PE - needed 3L at that time - stopped using it since she started CPAp at night. Measures Spo2 regularly at home, states she usually sats about 90-91 percent.

## 2020-11-17 NOTE — ED PROVIDER NOTE - NS ED ROS FT
Gen: +fever  CV: Denies chest pain  Skin: Denies rash, erythema  Resp: Denies SOB,  ++cough  ENT: Denies nasal congestion  Eyes: Denies blurry vision  GI: Denies nausea, vomiting, ++diarrhea  Msk: Denies LE swelling  : Denies dysuria  Neuro: Denies headache

## 2020-11-17 NOTE — ED ADULT TRIAGE NOTE - CHIEF COMPLAINT QUOTE
pt c/o "cough x 2 weeks that has gotten worse and I have been having fevers (104 today) and diarrhea.

## 2020-11-17 NOTE — ED PROVIDER NOTE - CLINICAL SUMMARY MEDICAL DECISION MAKING FREE TEXT BOX
D Presents with 10ds of dry cough, diarrhea and 1d of fever. Mild tachycardia, low grade fever, Spo2 at 87-88 RA. Eval for pneumonia. Hx of PE - will obtain CTA chest to eval for PE. Labs to eval for ACS, electrolyte abnormality, COVID, RVP. Likely admit.

## 2020-11-17 NOTE — ED ADULT NURSE NOTE - OBJECTIVE STATEMENT
59 yo female with pmh HTN, DVT (on xarelto), AARON (CPAP at home) presents to ED c/o diarrhea and nonproductive cough x1 week, and fever x 1 day. Pt reports approximately 3 episodes of nonbloody diarrhea per day. Pt reports MCINTOSH. Pt reports she used to use 3L of oxygen at home approximately 3 years ago but not currently on it, has been checking O2 sat at home and reports baseline O2 sat around 91%. Pt denies CP, abdominal pain/cramping, n/v, numbness/tingling, H/A, dizziness, lightheadedness, weakness, extremity swelling, blood in stool, urinary symptoms, blood in cough. Upon assessment, pt a&ox3, nad, no increased wob noted, skin warm and appropriate color, pt on 1L NC sating 95%, pt able to speak full sentences. Pt NSR on cardiac monitor. Pt safety and comfort provided.

## 2020-11-17 NOTE — ED PROVIDER NOTE - PHYSICAL EXAMINATION
Gen: morbidly obese, well developed and well nourished, NAD  Head: NC/NT  Eyes: PERRL  ENT: airway patent, mmm  CV: mildly tachy +S1/S2  Resp: +basilar wheezing  GI: +BS, abdomen soft non-distended, NTTP, no masses, no rebound, no guarding  Back: no CVA tenderness  Extremities - FROM, symmetric pulses, capillary refill < 2 seconds, no edema, 5/5 strength, sensation intact  Skin: no rashes, colors changes or bruising  Neuro: A&Ox3, following commands, speech clear, moving all four extremities spontaneously  Psych: appropriate mood, normal insight Gen: morbidly obese, well developed and well nourished, NAD  Head: NC/NT  Eyes: PERRL  ENT: airway patent, mmm  CV: mildly tachy +S1/S2  Resp: +basilar wheezing  GI:  abdomen soft non-distended, NTTP, scars noted  Back: no CVA tenderness  Extremities -  no edema  Neuro: A&Ox3, following commands, speech clear, moving all four extremities spontaneously  Psych: appropriate mood, normal insight

## 2020-11-18 DIAGNOSIS — J45.20 MILD INTERMITTENT ASTHMA, UNCOMPLICATED: ICD-10-CM

## 2020-11-18 DIAGNOSIS — G47.33 OBSTRUCTIVE SLEEP APNEA (ADULT) (PEDIATRIC): ICD-10-CM

## 2020-11-18 DIAGNOSIS — R19.7 DIARRHEA, UNSPECIFIED: ICD-10-CM

## 2020-11-18 DIAGNOSIS — U07.1 COVID-19: ICD-10-CM

## 2020-11-18 LAB
ANION GAP SERPL CALC-SCNC: 17 MMOL/L — SIGNIFICANT CHANGE UP (ref 5–17)
BASOPHILS # BLD AUTO: 0 K/UL — SIGNIFICANT CHANGE UP (ref 0–0.2)
BASOPHILS NFR BLD AUTO: 0 % — SIGNIFICANT CHANGE UP (ref 0–2)
BUN SERPL-MCNC: 25 MG/DL — HIGH (ref 7–23)
CALCIUM SERPL-MCNC: 9.2 MG/DL — SIGNIFICANT CHANGE UP (ref 8.4–10.5)
CHLORIDE SERPL-SCNC: 101 MMOL/L — SIGNIFICANT CHANGE UP (ref 96–108)
CO2 SERPL-SCNC: 21 MMOL/L — LOW (ref 22–31)
CREAT SERPL-MCNC: 0.99 MG/DL — SIGNIFICANT CHANGE UP (ref 0.5–1.3)
EOSINOPHIL # BLD AUTO: 0 K/UL — SIGNIFICANT CHANGE UP (ref 0–0.5)
EOSINOPHIL NFR BLD AUTO: 0 % — SIGNIFICANT CHANGE UP (ref 0–6)
FERRITIN SERPL-MCNC: 500 NG/ML — HIGH (ref 15–150)
GLUCOSE BLDC GLUCOMTR-MCNC: 184 MG/DL — HIGH (ref 70–99)
GLUCOSE BLDC GLUCOMTR-MCNC: 188 MG/DL — HIGH (ref 70–99)
GLUCOSE BLDC GLUCOMTR-MCNC: 193 MG/DL — HIGH (ref 70–99)
GLUCOSE BLDC GLUCOMTR-MCNC: 233 MG/DL — HIGH (ref 70–99)
GLUCOSE SERPL-MCNC: 247 MG/DL — HIGH (ref 70–99)
HCT VFR BLD CALC: 44.8 % — SIGNIFICANT CHANGE UP (ref 34.5–45)
HGB BLD-MCNC: 14.3 G/DL — SIGNIFICANT CHANGE UP (ref 11.5–15.5)
IMM GRANULOCYTES NFR BLD AUTO: 0.5 % — SIGNIFICANT CHANGE UP (ref 0–1.5)
LYMPHOCYTES # BLD AUTO: 0.6 K/UL — LOW (ref 1–3.3)
LYMPHOCYTES # BLD AUTO: 15.3 % — SIGNIFICANT CHANGE UP (ref 13–44)
MAGNESIUM SERPL-MCNC: 2.1 MG/DL — SIGNIFICANT CHANGE UP (ref 1.6–2.6)
MCHC RBC-ENTMCNC: 28 PG — SIGNIFICANT CHANGE UP (ref 27–34)
MCHC RBC-ENTMCNC: 31.9 GM/DL — LOW (ref 32–36)
MCV RBC AUTO: 87.8 FL — SIGNIFICANT CHANGE UP (ref 80–100)
MONOCYTES # BLD AUTO: 0.13 K/UL — SIGNIFICANT CHANGE UP (ref 0–0.9)
MONOCYTES NFR BLD AUTO: 3.3 % — SIGNIFICANT CHANGE UP (ref 2–14)
NEUTROPHILS # BLD AUTO: 3.17 K/UL — SIGNIFICANT CHANGE UP (ref 1.8–7.4)
NEUTROPHILS NFR BLD AUTO: 80.9 % — HIGH (ref 43–77)
NRBC # BLD: 0 /100 WBCS — SIGNIFICANT CHANGE UP (ref 0–0)
PHOSPHATE SERPL-MCNC: 3.4 MG/DL — SIGNIFICANT CHANGE UP (ref 2.5–4.5)
PLATELET # BLD AUTO: 193 K/UL — SIGNIFICANT CHANGE UP (ref 150–400)
POTASSIUM SERPL-MCNC: 3.7 MMOL/L — SIGNIFICANT CHANGE UP (ref 3.5–5.3)
POTASSIUM SERPL-SCNC: 3.7 MMOL/L — SIGNIFICANT CHANGE UP (ref 3.5–5.3)
RAPID RVP RESULT: DETECTED
RBC # BLD: 5.1 M/UL — SIGNIFICANT CHANGE UP (ref 3.8–5.2)
RBC # FLD: 15.9 % — HIGH (ref 10.3–14.5)
SARS-COV-2 IGG SERPL QL IA: NEGATIVE — SIGNIFICANT CHANGE UP
SARS-COV-2 IGM SERPL IA-ACNC: 0.1 INDEX — SIGNIFICANT CHANGE UP
SARS-COV-2 RNA SPEC QL NAA+PROBE: DETECTED
SODIUM SERPL-SCNC: 139 MMOL/L — SIGNIFICANT CHANGE UP (ref 135–145)
WBC # BLD: 3.92 K/UL — SIGNIFICANT CHANGE UP (ref 3.8–10.5)
WBC # FLD AUTO: 3.92 K/UL — SIGNIFICANT CHANGE UP (ref 3.8–10.5)

## 2020-11-18 PROCEDURE — 12345: CPT | Mod: NC

## 2020-11-18 PROCEDURE — 71275 CT ANGIOGRAPHY CHEST: CPT | Mod: 26

## 2020-11-18 PROCEDURE — 99223 1ST HOSP IP/OBS HIGH 75: CPT

## 2020-11-18 RX ORDER — DEXTROSE 50 % IN WATER 50 %
25 SYRINGE (ML) INTRAVENOUS ONCE
Refills: 0 | Status: DISCONTINUED | OUTPATIENT
Start: 2020-11-18 | End: 2020-11-23

## 2020-11-18 RX ORDER — INSULIN LISPRO 100/ML
VIAL (ML) SUBCUTANEOUS AT BEDTIME
Refills: 0 | Status: DISCONTINUED | OUTPATIENT
Start: 2020-11-18 | End: 2020-11-23

## 2020-11-18 RX ORDER — INSULIN LISPRO 100/ML
VIAL (ML) SUBCUTANEOUS
Refills: 0 | Status: DISCONTINUED | OUTPATIENT
Start: 2020-11-18 | End: 2020-11-23

## 2020-11-18 RX ORDER — RIVAROXABAN 15 MG-20MG
20 KIT ORAL
Refills: 0 | Status: DISCONTINUED | OUTPATIENT
Start: 2020-11-18 | End: 2020-11-23

## 2020-11-18 RX ORDER — REMDESIVIR 5 MG/ML
100 INJECTION INTRAVENOUS EVERY 24 HOURS
Refills: 0 | Status: COMPLETED | OUTPATIENT
Start: 2020-11-19 | End: 2020-11-22

## 2020-11-18 RX ORDER — SODIUM CHLORIDE 9 MG/ML
1000 INJECTION, SOLUTION INTRAVENOUS
Refills: 0 | Status: DISCONTINUED | OUTPATIENT
Start: 2020-11-18 | End: 2020-11-23

## 2020-11-18 RX ORDER — DEXAMETHASONE 0.5 MG/5ML
6 ELIXIR ORAL DAILY
Refills: 0 | Status: DISCONTINUED | OUTPATIENT
Start: 2020-11-18 | End: 2020-11-30

## 2020-11-18 RX ORDER — REMDESIVIR 5 MG/ML
200 INJECTION INTRAVENOUS EVERY 24 HOURS
Refills: 0 | Status: COMPLETED | OUTPATIENT
Start: 2020-11-18 | End: 2020-11-18

## 2020-11-18 RX ORDER — AMLODIPINE BESYLATE 2.5 MG/1
0 TABLET ORAL
Qty: 0 | Refills: 0 | DISCHARGE

## 2020-11-18 RX ORDER — DEXAMETHASONE 0.5 MG/5ML
6 ELIXIR ORAL ONCE
Refills: 0 | Status: COMPLETED | OUTPATIENT
Start: 2020-11-18 | End: 2020-11-18

## 2020-11-18 RX ORDER — DEXTROSE 50 % IN WATER 50 %
12.5 SYRINGE (ML) INTRAVENOUS ONCE
Refills: 0 | Status: DISCONTINUED | OUTPATIENT
Start: 2020-11-18 | End: 2020-11-23

## 2020-11-18 RX ORDER — GLUCAGON INJECTION, SOLUTION 0.5 MG/.1ML
1 INJECTION, SOLUTION SUBCUTANEOUS ONCE
Refills: 0 | Status: DISCONTINUED | OUTPATIENT
Start: 2020-11-18 | End: 2020-11-23

## 2020-11-18 RX ORDER — ALBUTEROL 90 UG/1
2 AEROSOL, METERED ORAL EVERY 6 HOURS
Refills: 0 | Status: DISCONTINUED | OUTPATIENT
Start: 2020-11-18 | End: 2020-11-23

## 2020-11-18 RX ORDER — REMDESIVIR 5 MG/ML
INJECTION INTRAVENOUS
Refills: 0 | Status: COMPLETED | OUTPATIENT
Start: 2020-11-18 | End: 2020-11-22

## 2020-11-18 RX ORDER — LISINOPRIL 2.5 MG/1
0 TABLET ORAL
Qty: 0 | Refills: 0 | DISCHARGE

## 2020-11-18 RX ORDER — FLUTICASONE PROPIONATE AND SALMETEROL 50; 250 UG/1; UG/1
1 POWDER ORAL; RESPIRATORY (INHALATION)
Qty: 0 | Refills: 0 | DISCHARGE

## 2020-11-18 RX ORDER — METOPROLOL TARTRATE 50 MG
50 TABLET ORAL
Refills: 0 | Status: DISCONTINUED | OUTPATIENT
Start: 2020-11-18 | End: 2020-11-23

## 2020-11-18 RX ORDER — RIVAROXABAN 15 MG-20MG
0 KIT ORAL
Qty: 0 | Refills: 0 | DISCHARGE

## 2020-11-18 RX ORDER — LISINOPRIL 2.5 MG/1
20 TABLET ORAL DAILY
Refills: 0 | Status: DISCONTINUED | OUTPATIENT
Start: 2020-11-18 | End: 2020-11-23

## 2020-11-18 RX ORDER — METOPROLOL TARTRATE 50 MG
0 TABLET ORAL
Qty: 0 | Refills: 0 | DISCHARGE

## 2020-11-18 RX ORDER — RIVAROXABAN 15 MG-20MG
20 KIT ORAL
Refills: 0 | Status: DISCONTINUED | OUTPATIENT
Start: 2020-11-18 | End: 2020-11-18

## 2020-11-18 RX ORDER — BENZOCAINE AND MENTHOL 5; 1 G/100ML; G/100ML
1 LIQUID ORAL
Refills: 0 | Status: DISCONTINUED | OUTPATIENT
Start: 2020-11-18 | End: 2020-11-23

## 2020-11-18 RX ORDER — DEXTROSE 50 % IN WATER 50 %
15 SYRINGE (ML) INTRAVENOUS ONCE
Refills: 0 | Status: DISCONTINUED | OUTPATIENT
Start: 2020-11-18 | End: 2020-11-23

## 2020-11-18 RX ORDER — BUDESONIDE AND FORMOTEROL FUMARATE DIHYDRATE 160; 4.5 UG/1; UG/1
2 AEROSOL RESPIRATORY (INHALATION)
Refills: 0 | Status: DISCONTINUED | OUTPATIENT
Start: 2020-11-18 | End: 2020-11-23

## 2020-11-18 RX ADMIN — Medication 6 MILLIGRAM(S): at 02:39

## 2020-11-18 RX ADMIN — Medication 50 MILLIGRAM(S): at 18:23

## 2020-11-18 RX ADMIN — BUDESONIDE AND FORMOTEROL FUMARATE DIHYDRATE 2 PUFF(S): 160; 4.5 AEROSOL RESPIRATORY (INHALATION) at 09:17

## 2020-11-18 RX ADMIN — Medication 100 MILLIGRAM(S): at 18:23

## 2020-11-18 RX ADMIN — RIVAROXABAN 20 MILLIGRAM(S): KIT at 12:26

## 2020-11-18 RX ADMIN — REMDESIVIR 500 MILLIGRAM(S): 5 INJECTION INTRAVENOUS at 11:00

## 2020-11-18 RX ADMIN — Medication 2: at 17:40

## 2020-11-18 RX ADMIN — BUDESONIDE AND FORMOTEROL FUMARATE DIHYDRATE 2 PUFF(S): 160; 4.5 AEROSOL RESPIRATORY (INHALATION) at 21:49

## 2020-11-18 NOTE — H&P ADULT - NSICDXPASTMEDICALHX_GEN_ALL_CORE_FT
PAST MEDICAL HISTORY:  Asthma diagnosed 2010  on adbvair denies attacks    DVT (Deep Venous Thrombosis) left leg 6 m s/p knee replacement in 2010    GERD (Gastroesophageal Reflux Disease)     HTN (Hypertension)     AARON (Obstructive Sleep Apnea) category II pt uses c/pap pt to bering to hospital/  OR booking notified    Osteoarthritis of both knees, unspecified osteoarthritis type     Pneumomediastinum     Pneumonia 9-2010    Umbilical hernia Reduciable

## 2020-11-18 NOTE — H&P ADULT - HISTORY OF PRESENT ILLNESS
60F w/pmhx AARON, HTN, DVT&PE in the past, ?Asthma (had this dx but per pt might have been 2/2 effects of PE?-still uses symbicort intermittently), now p/w a 9 day hx of cough productive of clear sputum, 7 day hx of diarrhea (2-4 episodes per day) and now with a 1 day hx of fever. 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 Patient reports that she also had a general feeling of "uneasiness" that she could not explain for the last week. No chest pain. No pleuritic pain. No leg swelling or pain. Denies myalgias. Denies sick contacts. Reports that her  and son live with her and they have tested so far negative for covid and are asymptomatic, patient is not sure where was exposed. Reports trying her symbicort this week for shortness of breath without much relief. Patient yesterday began to have fevers with highest fever at 104F which alarmed her and for that reason she came to the hospital for help.

## 2020-11-18 NOTE — H&P ADULT - NSICDXFAMILYHX_GEN_ALL_CORE_FT
FAMILY HISTORY:  Family history of gastric cancer    Grandparent  Still living? Unknown  Family history of breast cancer, Age at diagnosis: Age Unknown

## 2020-11-18 NOTE — H&P ADULT - PROBLEM SELECTOR PLAN 1
Pt with acute hypoxemic respiratory failure (o2 sat of 94% despite 3L NC) likely 2/2 COVID19 pneumonia.  Continue O2 supplementation with NC  Symbicort as patient already on this but will use persistently for now  Start decadron with GI ppx and FS monitoring  Start remdesivir

## 2020-11-18 NOTE — CHART NOTE - NSCHARTNOTEFT_GEN_A_CORE
Pt seen at bedside, reports she feels much improved with the O2 and while receiving decadron.  Still has some SOB when ambulating. No further diarrhea. no cp, n/v/f/chills.    VSS. PE remarkable for morbidly obese f, sititng up in chair, breathing comfortably w/ 3L NC, in NAD, lungs difficult to appreciate through disposable stethoscope, heart rrr, abd sntnd, legs no edema    Labs reviewed.    Impression: COVID Pneumonia  - c/w remdesivir x 5 days, decadron and ISS  - c/w home xarelto (pt reports she takes only in AM at 10:30)  - D/W NP/PA Elissa Pt seen at bedside, reports she feels much improved with the O2 and while receiving decadron.  Still has some SOB when ambulating. No further diarrhea. no cp, n/v/f/chills.    VSS. PE remarkable for morbidly obese f, sititng up in chair, breathing comfortably w/ 3L NC, in NAD, lungs difficult to appreciate through disposable stethoscope, heart rrr, abd sntnd, legs no edema    Labs reviewed.    Impression: Acute hypoxic respiratory failure 2/2 COVID Pneumonia  - c/w remdesivir x 5 days, decadron and ISS  - c/w home xarelto (pt reports she takes only in AM at 10:30)  - wean off O2 as tolerates  - D/W NP/PA Elissa

## 2020-11-18 NOTE — H&P ADULT - ASSESSMENT
60F w/pmhx AARON, HTN, DVT&PE in the past, ?Asthma (had this dx but per pt might have been 2/2 effects of PE?-still uses symbicort intermittently), now p/w a 9 day hx of cough productive of clear sputum associated with feelings of SOB, 7 day hx of diarrhea (2-4 episodes per day) and now with a 1 day hx of fever

## 2020-11-18 NOTE — H&P ADULT - NSHPPHYSICALEXAM_GEN_ALL_CORE
Vital Signs Last 24 Hrs  T(C): 36.9 (18 Nov 2020 04:00), Max: 37.6 (17 Nov 2020 20:39)  T(F): 98.4 (18 Nov 2020 04:00), Max: 99.6 (17 Nov 2020 20:39)  HR: 88 (18 Nov 2020 04:00) (79 - 105)  BP: 108/60 (18 Nov 2020 04:00) (104/70 - 136/74)  BP(mean): --  RR: 21 (18 Nov 2020 04:00) (19 - 21)  SpO2: 97% (18 Nov 2020 04:00) (94% - 97%)    GENERAL: No acute distress, well-developed  HEAD:  Atraumatic, Normocephalic  EYES: EOMI, PERRLA, conjunctiva and sclera clear  ENT: Oral mucosa moist  NECK: Neck supple  CHEST/LUNG: Clear to auscultation bilaterally; No wheeze, no rales, no rhonchi.    HEART: Regular rate and rhythm; No murmurs, rubs, or gallops  ABDOMEN: Soft, Nontender, Nondistended; Bowel sounds present  EXTREMITIES:  No clubbing, cyanosis, or edema  VASCULAR: Posterior tibialis pulses intact bilaterally  PSYCH: Normal behavior, normal affect  NEUROLOGY: AAOx3  SKIN: grossly warm and dry

## 2020-11-18 NOTE — H&P ADULT - NSICDXPASTSURGICALHX_GEN_ALL_CORE_FT
PAST SURGICAL HISTORY:  H/O ileostomy Ileostomy Revesal     History of Tubal Ligation s/p c/section     S/P  Section     S/P Colonoscopy     S/P Endoscopy  gerd    S/P Exploratory Laparotomy   peritonitis  s/p right knee replacement/ colon resection and ileostomy    S/P Knee Surgery 2010    S/P LEEP 2000

## 2020-11-19 DIAGNOSIS — E11.9 TYPE 2 DIABETES MELLITUS WITHOUT COMPLICATIONS: ICD-10-CM

## 2020-11-19 DIAGNOSIS — Z02.9 ENCOUNTER FOR ADMINISTRATIVE EXAMINATIONS, UNSPECIFIED: ICD-10-CM

## 2020-11-19 LAB
A1C WITH ESTIMATED AVERAGE GLUCOSE RESULT: 7 % — HIGH (ref 4–5.6)
ALBUMIN SERPL ELPH-MCNC: 3.8 G/DL — SIGNIFICANT CHANGE UP (ref 3.3–5)
ALP SERPL-CCNC: 48 U/L — SIGNIFICANT CHANGE UP (ref 40–120)
ALT FLD-CCNC: 35 U/L — SIGNIFICANT CHANGE UP (ref 10–45)
ANION GAP SERPL CALC-SCNC: 16 MMOL/L — SIGNIFICANT CHANGE UP (ref 5–17)
APPEARANCE UR: CLEAR — SIGNIFICANT CHANGE UP
AST SERPL-CCNC: 47 U/L — HIGH (ref 10–40)
BACTERIA # UR AUTO: NEGATIVE — SIGNIFICANT CHANGE UP
BILIRUB DIRECT SERPL-MCNC: 0.1 MG/DL — SIGNIFICANT CHANGE UP (ref 0–0.2)
BILIRUB INDIRECT FLD-MCNC: 0.3 MG/DL — SIGNIFICANT CHANGE UP (ref 0.2–1)
BILIRUB SERPL-MCNC: 0.4 MG/DL — SIGNIFICANT CHANGE UP (ref 0.2–1.2)
BILIRUB UR-MCNC: NEGATIVE — SIGNIFICANT CHANGE UP
BUN SERPL-MCNC: 28 MG/DL — HIGH (ref 7–23)
CALCIUM SERPL-MCNC: 9.2 MG/DL — SIGNIFICANT CHANGE UP (ref 8.4–10.5)
CHLORIDE SERPL-SCNC: 105 MMOL/L — SIGNIFICANT CHANGE UP (ref 96–108)
CO2 SERPL-SCNC: 21 MMOL/L — LOW (ref 22–31)
COLOR SPEC: SIGNIFICANT CHANGE UP
CREAT SERPL-MCNC: 1.1 MG/DL — SIGNIFICANT CHANGE UP (ref 0.5–1.3)
DIFF PNL FLD: NEGATIVE — SIGNIFICANT CHANGE UP
EPI CELLS # UR: 3 /HPF — SIGNIFICANT CHANGE UP
ESTIMATED AVERAGE GLUCOSE: 154 MG/DL — HIGH (ref 68–114)
GLUCOSE BLDC GLUCOMTR-MCNC: 201 MG/DL — HIGH (ref 70–99)
GLUCOSE BLDC GLUCOMTR-MCNC: 231 MG/DL — HIGH (ref 70–99)
GLUCOSE BLDC GLUCOMTR-MCNC: 235 MG/DL — HIGH (ref 70–99)
GLUCOSE BLDC GLUCOMTR-MCNC: 260 MG/DL — HIGH (ref 70–99)
GLUCOSE SERPL-MCNC: 174 MG/DL — HIGH (ref 70–99)
GLUCOSE UR QL: NEGATIVE — SIGNIFICANT CHANGE UP
HCT VFR BLD CALC: 43.8 % — SIGNIFICANT CHANGE UP (ref 34.5–45)
HCV AB S/CO SERPL IA: 0.1 S/CO — SIGNIFICANT CHANGE UP (ref 0–0.99)
HCV AB SERPL-IMP: SIGNIFICANT CHANGE UP
HGB BLD-MCNC: 14 G/DL — SIGNIFICANT CHANGE UP (ref 11.5–15.5)
HYALINE CASTS # UR AUTO: 1 /LPF — SIGNIFICANT CHANGE UP (ref 0–2)
KETONES UR-MCNC: NEGATIVE — SIGNIFICANT CHANGE UP
LEUKOCYTE ESTERASE UR-ACNC: ABNORMAL
MAGNESIUM SERPL-MCNC: 2.1 MG/DL — SIGNIFICANT CHANGE UP (ref 1.6–2.6)
MCHC RBC-ENTMCNC: 27.8 PG — SIGNIFICANT CHANGE UP (ref 27–34)
MCHC RBC-ENTMCNC: 32 GM/DL — SIGNIFICANT CHANGE UP (ref 32–36)
MCV RBC AUTO: 87.1 FL — SIGNIFICANT CHANGE UP (ref 80–100)
NITRITE UR-MCNC: NEGATIVE — SIGNIFICANT CHANGE UP
NRBC # BLD: 0 /100 WBCS — SIGNIFICANT CHANGE UP (ref 0–0)
PH UR: 7 — SIGNIFICANT CHANGE UP (ref 5–8)
PLATELET # BLD AUTO: 240 K/UL — SIGNIFICANT CHANGE UP (ref 150–400)
POTASSIUM SERPL-MCNC: 3.6 MMOL/L — SIGNIFICANT CHANGE UP (ref 3.5–5.3)
POTASSIUM SERPL-SCNC: 3.6 MMOL/L — SIGNIFICANT CHANGE UP (ref 3.5–5.3)
PROT SERPL-MCNC: 7.4 G/DL — SIGNIFICANT CHANGE UP (ref 6–8.3)
PROT UR-MCNC: SIGNIFICANT CHANGE UP
RBC # BLD: 5.03 M/UL — SIGNIFICANT CHANGE UP (ref 3.8–5.2)
RBC # FLD: 16 % — HIGH (ref 10.3–14.5)
RBC CASTS # UR COMP ASSIST: 2 /HPF — SIGNIFICANT CHANGE UP (ref 0–4)
SODIUM SERPL-SCNC: 142 MMOL/L — SIGNIFICANT CHANGE UP (ref 135–145)
SP GR SPEC: 1.02 — SIGNIFICANT CHANGE UP (ref 1.01–1.02)
UROBILINOGEN FLD QL: NEGATIVE — SIGNIFICANT CHANGE UP
WBC # BLD: 6.34 K/UL — SIGNIFICANT CHANGE UP (ref 3.8–10.5)
WBC # FLD AUTO: 6.34 K/UL — SIGNIFICANT CHANGE UP (ref 3.8–10.5)
WBC UR QL: 29 /HPF — HIGH (ref 0–5)

## 2020-11-19 PROCEDURE — 99233 SBSQ HOSP IP/OBS HIGH 50: CPT

## 2020-11-19 RX ORDER — ACETAMINOPHEN 500 MG
1000 TABLET ORAL ONCE
Refills: 0 | Status: COMPLETED | OUTPATIENT
Start: 2020-11-19 | End: 2020-11-19

## 2020-11-19 RX ADMIN — BENZOCAINE AND MENTHOL 1 LOZENGE: 5; 1 LIQUID ORAL at 15:37

## 2020-11-19 RX ADMIN — Medication 4: at 08:19

## 2020-11-19 RX ADMIN — Medication 400 MILLIGRAM(S): at 05:18

## 2020-11-19 RX ADMIN — Medication 50 MILLIGRAM(S): at 17:55

## 2020-11-19 RX ADMIN — Medication 50 MILLIGRAM(S): at 05:40

## 2020-11-19 RX ADMIN — Medication 4: at 17:04

## 2020-11-19 RX ADMIN — Medication 100 MILLIGRAM(S): at 15:37

## 2020-11-19 RX ADMIN — Medication 6 MILLIGRAM(S): at 05:39

## 2020-11-19 RX ADMIN — REMDESIVIR 500 MILLIGRAM(S): 5 INJECTION INTRAVENOUS at 19:00

## 2020-11-19 RX ADMIN — Medication 6: at 12:42

## 2020-11-19 RX ADMIN — BUDESONIDE AND FORMOTEROL FUMARATE DIHYDRATE 2 PUFF(S): 160; 4.5 AEROSOL RESPIRATORY (INHALATION) at 10:04

## 2020-11-19 RX ADMIN — RIVAROXABAN 20 MILLIGRAM(S): KIT at 10:00

## 2020-11-19 RX ADMIN — BUDESONIDE AND FORMOTEROL FUMARATE DIHYDRATE 2 PUFF(S): 160; 4.5 AEROSOL RESPIRATORY (INHALATION) at 22:28

## 2020-11-19 NOTE — PROGRESS NOTE ADULT - PROBLEM SELECTOR PLAN 2
a1c 7, new dx  -diabetic RN consult, nutrition c/s  -iss, caleb while on steroids  -plan to dc home on metformin, advise weight loss

## 2020-11-19 NOTE — PROGRESS NOTE ADULT - PROBLEM SELECTOR PLAN 1
likely 2/2 COVID19 pneumonia, no PE per CTA  met sepsis criteria  -Continue O2 supplementation with NC and wean down as tolerates, otherwise NRB when hypoxia worsens  -c/w decadron iv x 10 days  -c/w remdesivir x 5 days  -prone positioning PRN  -cepacol, tessalon pearle prn  -bld cx were checked last night, will f/u. UA ? positive however pt asymptomatic and thus will not treat.

## 2020-11-19 NOTE — PROVIDER CONTACT NOTE (OTHER) - SITUATION
Pt ambulated to bathroom without staff and without nonrebreather on. Pt desatted to 70% Pt ambulated to bathroom without staff and without nonrebreather on. Pt desatted to 70% placed back on nonrebreather satting mid 80s, placed in CPAP, semi-proned position satting between 87-90%

## 2020-11-19 NOTE — PROGRESS NOTE ADULT - ATTENDING COMMENTS
worsened hypoxia since yesterday, encouraged pt to lie prone PRN today, c/w supplemental O2/steroids/remdesivir  new dm2 diagnosis, c/s diabetic nurse and nutrition    Jade Barraza MD  Division of Hospital Medicine  Pager: [phone]  Office: [phone]

## 2020-11-19 NOTE — CHART NOTE - NSCHARTNOTEFT_GEN_A_CORE
Notified by RN for temp-103F. Pt with no complaints at this time denying chest pain, worsening SOB, N/V/D. Pt afebrile this admission with last fever at home on 11/17. VS hemodynamically stable other than O2sat- L NC, HR-. Will order IV Tylenol and cooling measures PRN. Will order BCx2 and UA. Will c/w to monitor Pt and vitals closely. Will endorse to AM team, attending to follow.         So Madsen PA-C  Department of Medicine Notified by RN for temp-103F. Pt with no complaints at this time denying chest pain, worsening SOB, N/V/D. Pt afebrile this admission with last fever at home on 11/17. VS hemodynamically stable other than [id number]% on 7.5L NC, HR- 103. Will bump Pt to venturi mask. Will order IV Tylenol and cooling measures PRN. Will order BCx2 and UA. Will c/w to monitor Pt and vitals closely. Will endorse to AM team, attending to follow.     Vital Signs Last 24 Hrs  T(C): 39.4 (19 Nov 2020 04:49), Max: 39.4 (19 Nov 2020 04:49)  T(F): 103 (19 Nov 2020 04:49), Max: 103 (19 Nov 2020 04:49)  HR: 103 (19 Nov 2020 04:49) (84 - 103)  BP: 110/58 (19 Nov 2020 04:49) (110/58 - 152/75)  BP(mean): --  RR: 20 (19 Nov 2020 04:49) (19 - 20)  SpO2: 85% (19 Nov 2020 04:49) (85% - 96%)    So Madsen PA-C  Department of Medicine Notified by RN for temp-103F. Pt with no complaints at this time denying chest pain, worsening SOB, N/V/D. Pt afebrile this admission with last fever at home on 11/17. VS hemodynamically stable other than [id number]-91% on 7.5L NC, HR- 103. Will bump Pt to venturi mask. Will order IV Tylenol and cooling measures PRN. Will order BCx2 and UA. C/w decadron and remdesivir. Will c/w to monitor Pt and vitals closely. Will endorse to AM team, attending to follow.     Vital Signs Last 24 Hrs  T(C): 39.4 (19 Nov 2020 04:49), Max: 39.4 (19 Nov 2020 04:49)  T(F): 103 (19 Nov 2020 04:49), Max: 103 (19 Nov 2020 04:49)  HR: 103 (19 Nov 2020 04:49) (84 - 103)  BP: 110/58 (19 Nov 2020 04:49) (110/58 - 152/75)  BP(mean): --  RR: 20 (19 Nov 2020 04:49) (19 - 20)  SpO2: 85% (19 Nov 2020 04:49) (85% - 96%)    So Madsen PA-C  Department of Medicine

## 2020-11-19 NOTE — PROGRESS NOTE ADULT - ASSESSMENT
60F w/ pmhx morbid obesity, AARON, HTN, DVT&PE in the past, ?Asthma now p/w a 9 day hx of cough/SOB, f adm with sepsis 2/2 acute hypoxic respiratory failure d/t COVID+ pneumonia.

## 2020-11-19 NOTE — PROGRESS NOTE ADULT - SUBJECTIVE AND OBJECTIVE BOX
Patient is a 60y old  Female who presents with a chief complaint of Acute hypoxemic respiratory failure 2/2 covid19 (2020 05:46)        SUBJECTIVE / OVERNIGHT EVENTS:  overnight, pt with fever up to 103 and desatted on NC, requiring venti mask.  seen at bedside today, pt states she feels worse now than yesterday  NRB mask is helping her breathe.  no n/v/f/chills, cp, abd pain.    CAPILLARY BLOOD GLUCOSE      POCT Blood Glucose.: 260 mg/dL (2020 12:15)  POCT Blood Glucose.: 201 mg/dL (2020 07:53)  POCT Blood Glucose.: 184 mg/dL (2020 21:31)  POCT Blood Glucose.: 193 mg/dL (2020 16:49)    I&O's Summary    2020 07:01  -  2020 07:00  --------------------------------------------------------  IN: 220 mL / OUT: 0 mL / NET: 220 mL      Vital Signs Last 24 Hrs  T(C): 37.1 (2020 12:30), Max: 39.4 (2020 04:49)  T(F): 98.8 (2020 12:30), Max: 103 (2020 04:49)  HR: 74 (2020 12:57) (74 - 104)  BP: 103/65 (2020 12:30) (103/65 - 152/75)  BP(mean): --  RR: 19 (2020 12:30) (19 - 20)  SpO2: 92% (2020 12:57) (85% - 97%)    PHYSICAL EXAM:  GENERAL:  Well appearing, morbidly obese f, sitting up w/ nonrebreather mask, in NAD  HEAD:  NCAT  EYES: PERRLA, conjunctiva clear  NECK: Supple  CHEST/LUNG: CTA B/L  HEART: Reg rate. Normal S1, S2. No m/r/g.   ABDOMEN: SNTND.   EXTREMITIES:  2+ Peripheral Pulses, No clubbing, cyanosis, edema.  PSYCH: AAOx3, appropriate affect  SKIN: No rashes or lesions    LABS:                        14.0   6.34  )-----------( 240      ( 2020 06:48 )             43.8     -    142  |  105  |  28<H>  ----------------------------<  174<H>  3.6   |  21<L>  |  1.10    Ca    9.2      2020 06:43  Phos  3.4     11  Mg     2.1         TPro  7.4  /  Alb  3.8  /  TBili  0.4  /  DBili  0.1  /  AST  47<H>  /  ALT  35  /  AlkPhos  48      PT/INR - ( 2020 22:58 )   PT: 22.8 sec;   INR: 1.96 ratio         PTT - ( 2020 22:58 )  PTT:38.9 sec      Urinalysis Basic - ( 2020 07:41 )    Color: Light Yellow / Appearance: Clear / S.022 / pH: x  Gluc: x / Ketone: Negative  / Bili: Negative / Urobili: Negative   Blood: x / Protein: Trace / Nitrite: Negative   Leuk Esterase: Large / RBC: 2 /hpf / WBC 29 /HPF   Sq Epi: x / Non Sq Epi: 3 /hpf / Bacteria: Negative    RADIOLOGY & ADDITIONAL TESTS:  Care Discussed with Consultants/Other Providers: Floor NP/PA    MEDICATIONS  (STANDING):  budesonide  80 MICROgram(s)/formoterol 4.5 MICROgram(s) Inhaler 2 Puff(s) Inhalation two times a day  dexAMETHasone  Injectable 6 milliGRAM(s) IV Push daily  dextrose 40% Gel 15 Gram(s) Oral once  dextrose 5%. 1000 milliLiter(s) (50 mL/Hr) IV Continuous <Continuous>  dextrose 5%. 1000 milliLiter(s) (100 mL/Hr) IV Continuous <Continuous>  dextrose 50% Injectable 25 Gram(s) IV Push once  dextrose 50% Injectable 12.5 Gram(s) IV Push once  dextrose 50% Injectable 25 Gram(s) IV Push once  glucagon  Injectable 1 milliGRAM(s) IntraMuscular once  insulin lispro (ADMELOG) corrective regimen sliding scale   SubCutaneous three times a day before meals  insulin lispro (ADMELOG) corrective regimen sliding scale   SubCutaneous at bedtime  lisinopril 20 milliGRAM(s) Oral daily  metoprolol tartrate 50 milliGRAM(s) Oral two times a day  remdesivir  IVPB   IV Intermittent   remdesivir  IVPB 100 milliGRAM(s) IV Intermittent every 24 hours  rivaroxaban 20 milliGRAM(s) Oral with dinner    MEDICATIONS  (PRN):  ALBUTerol    90 MICROgram(s) HFA Inhaler 2 Puff(s) Inhalation every 6 hours PRN Shortness of Breath and/or Wheezing  benzocaine 15 mG/menthol 3.6 mG (Sugar-Free) Lozenge 1 Lozenge Oral four times a day PRN Sore Throat  benzonatate 100 milliGRAM(s) Oral every 8 hours PRN Cough

## 2020-11-20 LAB
ALBUMIN SERPL ELPH-MCNC: 3.4 G/DL — SIGNIFICANT CHANGE UP (ref 3.3–5)
ALP SERPL-CCNC: 44 U/L — SIGNIFICANT CHANGE UP (ref 40–120)
ALT FLD-CCNC: 34 U/L — SIGNIFICANT CHANGE UP (ref 10–45)
ANION GAP SERPL CALC-SCNC: 15 MMOL/L — SIGNIFICANT CHANGE UP (ref 5–17)
AST SERPL-CCNC: 48 U/L — HIGH (ref 10–40)
BILIRUB DIRECT SERPL-MCNC: <0.1 MG/DL — SIGNIFICANT CHANGE UP (ref 0–0.2)
BILIRUB INDIRECT FLD-MCNC: >0.3 MG/DL — SIGNIFICANT CHANGE UP (ref 0.2–1)
BILIRUB SERPL-MCNC: 0.4 MG/DL — SIGNIFICANT CHANGE UP (ref 0.2–1.2)
BUN SERPL-MCNC: 37 MG/DL — HIGH (ref 7–23)
CALCIUM SERPL-MCNC: 9.3 MG/DL — SIGNIFICANT CHANGE UP (ref 8.4–10.5)
CHLORIDE SERPL-SCNC: 109 MMOL/L — HIGH (ref 96–108)
CO2 SERPL-SCNC: 22 MMOL/L — SIGNIFICANT CHANGE UP (ref 22–31)
CREAT SERPL-MCNC: 0.95 MG/DL — SIGNIFICANT CHANGE UP (ref 0.5–1.3)
GLUCOSE BLDC GLUCOMTR-MCNC: 217 MG/DL — HIGH (ref 70–99)
GLUCOSE BLDC GLUCOMTR-MCNC: 231 MG/DL — HIGH (ref 70–99)
GLUCOSE BLDC GLUCOMTR-MCNC: 271 MG/DL — HIGH (ref 70–99)
GLUCOSE BLDC GLUCOMTR-MCNC: 313 MG/DL — HIGH (ref 70–99)
GLUCOSE SERPL-MCNC: 194 MG/DL — HIGH (ref 70–99)
HCT VFR BLD CALC: 44.3 % — SIGNIFICANT CHANGE UP (ref 34.5–45)
HGB BLD-MCNC: 13.6 G/DL — SIGNIFICANT CHANGE UP (ref 11.5–15.5)
LACTATE SERPL-SCNC: 2.1 MMOL/L — HIGH (ref 0.7–2)
MAGNESIUM SERPL-MCNC: 2.4 MG/DL — SIGNIFICANT CHANGE UP (ref 1.6–2.6)
MCHC RBC-ENTMCNC: 27.4 PG — SIGNIFICANT CHANGE UP (ref 27–34)
MCHC RBC-ENTMCNC: 30.7 GM/DL — LOW (ref 32–36)
MCV RBC AUTO: 89.1 FL — SIGNIFICANT CHANGE UP (ref 80–100)
NRBC # BLD: 0 /100 WBCS — SIGNIFICANT CHANGE UP (ref 0–0)
PLATELET # BLD AUTO: 194 K/UL — SIGNIFICANT CHANGE UP (ref 150–400)
POTASSIUM SERPL-MCNC: 4.4 MMOL/L — SIGNIFICANT CHANGE UP (ref 3.5–5.3)
POTASSIUM SERPL-SCNC: 4.4 MMOL/L — SIGNIFICANT CHANGE UP (ref 3.5–5.3)
PROT SERPL-MCNC: 7.2 G/DL — SIGNIFICANT CHANGE UP (ref 6–8.3)
RBC # BLD: 4.97 M/UL — SIGNIFICANT CHANGE UP (ref 3.8–5.2)
RBC # FLD: 16.1 % — HIGH (ref 10.3–14.5)
SODIUM SERPL-SCNC: 146 MMOL/L — HIGH (ref 135–145)
WBC # BLD: 6.39 K/UL — SIGNIFICANT CHANGE UP (ref 3.8–10.5)
WBC # FLD AUTO: 6.39 K/UL — SIGNIFICANT CHANGE UP (ref 3.8–10.5)

## 2020-11-20 PROCEDURE — 99233 SBSQ HOSP IP/OBS HIGH 50: CPT

## 2020-11-20 RX ORDER — SODIUM CHLORIDE 9 MG/ML
500 INJECTION INTRAMUSCULAR; INTRAVENOUS; SUBCUTANEOUS ONCE
Refills: 0 | Status: COMPLETED | OUTPATIENT
Start: 2020-11-20 | End: 2020-11-20

## 2020-11-20 RX ADMIN — Medication 4: at 17:31

## 2020-11-20 RX ADMIN — Medication 100 MILLIGRAM(S): at 18:49

## 2020-11-20 RX ADMIN — Medication 8: at 12:10

## 2020-11-20 RX ADMIN — REMDESIVIR 500 MILLIGRAM(S): 5 INJECTION INTRAVENOUS at 17:31

## 2020-11-20 RX ADMIN — SODIUM CHLORIDE 250 MILLILITER(S): 9 INJECTION INTRAMUSCULAR; INTRAVENOUS; SUBCUTANEOUS at 12:13

## 2020-11-20 RX ADMIN — Medication 50 MILLIGRAM(S): at 05:53

## 2020-11-20 RX ADMIN — Medication 100 MILLIGRAM(S): at 05:53

## 2020-11-20 RX ADMIN — Medication 6 MILLIGRAM(S): at 05:53

## 2020-11-20 RX ADMIN — Medication 50 MILLIGRAM(S): at 18:09

## 2020-11-20 RX ADMIN — Medication 6: at 10:10

## 2020-11-20 RX ADMIN — BENZOCAINE AND MENTHOL 1 LOZENGE: 5; 1 LIQUID ORAL at 18:49

## 2020-11-20 RX ADMIN — BUDESONIDE AND FORMOTEROL FUMARATE DIHYDRATE 2 PUFF(S): 160; 4.5 AEROSOL RESPIRATORY (INHALATION) at 10:11

## 2020-11-20 RX ADMIN — BUDESONIDE AND FORMOTEROL FUMARATE DIHYDRATE 2 PUFF(S): 160; 4.5 AEROSOL RESPIRATORY (INHALATION) at 21:51

## 2020-11-20 RX ADMIN — RIVAROXABAN 20 MILLIGRAM(S): KIT at 18:09

## 2020-11-20 NOTE — PROGRESS NOTE ADULT - ATTENDING COMMENTS
worsened hypoxia since yesterday, c/w supplemental O2 w/ NRB, c/w steroids/remdesivir    Jade Barraza MD  Division of Hospital Medicine  Pager: [phone]  Office: [phone]

## 2020-11-20 NOTE — PROGRESS NOTE ADULT - SUBJECTIVE AND OBJECTIVE BOX
Patient is a 60y old  Female who presents with a chief complaint of Acute hypoxemic respiratory failure 2/2 covid19 (2020 14:25)        SUBJECTIVE / OVERNIGHT EVENTS:  overnight w/ hypoxia on NRB,   seen at bedside, states she feels worse today, more SOB.  no n/v/f/chills, cp, abd pain.  trying to lie prone prn but admits it is tough to do so.    CAPILLARY BLOOD GLUCOSE      POCT Blood Glucose.: 313 mg/dL (2020 11:57)  POCT Blood Glucose.: 271 mg/dL (2020 09:58)  POCT Blood Glucose.: 235 mg/dL (2020 21:26)  POCT Blood Glucose.: 231 mg/dL (2020 16:24)    I&O's Summary    2020 07:01  -  2020 07:00  --------------------------------------------------------  IN: 600 mL / OUT: 0 mL / NET: 600 mL    2020 07:01  -  2020 15:08  --------------------------------------------------------  IN: 240 mL / OUT: 0 mL / NET: 240 mL      Vital Signs Last 24 Hrs  T(C): 36.8 (2020 11:33), Max: 37 (2020 05:14)  T(F): 98.2 (2020 11:33), Max: 98.6 (2020 05:14)  HR: 71 (2020 11:33) (66 - 77)  BP: 109/66 (2020 11:33) (106/66 - 118/61)  BP(mean): --  RR: 20 (2020 11:33) (20 - 20)  SpO2: 89% (2020 11:33) (87% - 98%)    PHYSICAL EXAM:  GENERAL:  Well appearing, morbidly obese f, lying in bed w/ nonrebreather mask, in NAD  HEAD:  NCAT  EYES: PERRLA, conjunctiva clear  NECK: Supple  CHEST/LUNG: CTA B/L  HEART: Reg rate. Normal S1, S2. No m/r/g.   ABDOMEN: SNTND.   EXTREMITIES:  2+ Peripheral Pulses, No clubbing, cyanosis, edema.  PSYCH: AAOx3, appropriate affect  SKIN: No rashes or lesions    LABS:                        13.6   6.39  )-----------( 194      ( 2020 05:58 )             44.3         146<H>  |  109<H>  |  37<H>  ----------------------------<  194<H>  4.4   |  22  |  0.95    Ca    9.3      2020 05:57  Mg     2.4         TPro  7.2  /  Alb  3.4  /  TBili  0.4  /  DBili  <0.1  /  AST  48<H>  /  ALT  34  /  AlkPhos  44            Urinalysis Basic - ( 2020 07:41 )    Color: Light Yellow / Appearance: Clear / S.022 / pH: x  Gluc: x / Ketone: Negative  / Bili: Negative / Urobili: Negative   Blood: x / Protein: Trace / Nitrite: Negative   Leuk Esterase: Large / RBC: 2 /hpf / WBC 29 /HPF   Sq Epi: x / Non Sq Epi: 3 /hpf / Bacteria: Negative        Culture - Blood (collected 2020 10:21)  Source: .Blood Blood-Peripheral  Preliminary Report (2020 11:01):    No growth to date.    Culture - Blood (collected 2020 10:21)  Source: .Blood Blood-Peripheral  Preliminary Report (2020 11:01):    No growth to date.        RADIOLOGY & ADDITIONAL TESTS:  Care Discussed with Consultants/Other Providers: Floor NP/PA    MEDICATIONS  (STANDING):  budesonide  80 MICROgram(s)/formoterol 4.5 MICROgram(s) Inhaler 2 Puff(s) Inhalation two times a day  dexAMETHasone  Injectable 6 milliGRAM(s) IV Push daily  dextrose 40% Gel 15 Gram(s) Oral once  dextrose 5%. 1000 milliLiter(s) (50 mL/Hr) IV Continuous <Continuous>  dextrose 5%. 1000 milliLiter(s) (100 mL/Hr) IV Continuous <Continuous>  dextrose 50% Injectable 25 Gram(s) IV Push once  dextrose 50% Injectable 12.5 Gram(s) IV Push once  dextrose 50% Injectable 25 Gram(s) IV Push once  glucagon  Injectable 1 milliGRAM(s) IntraMuscular once  insulin lispro (ADMELOG) corrective regimen sliding scale   SubCutaneous three times a day before meals  insulin lispro (ADMELOG) corrective regimen sliding scale   SubCutaneous at bedtime  lisinopril 20 milliGRAM(s) Oral daily  metoprolol tartrate 50 milliGRAM(s) Oral two times a day  remdesivir  IVPB   IV Intermittent   remdesivir  IVPB 100 milliGRAM(s) IV Intermittent every 24 hours  rivaroxaban 20 milliGRAM(s) Oral with dinner    MEDICATIONS  (PRN):  ALBUTerol    90 MICROgram(s) HFA Inhaler 2 Puff(s) Inhalation every 6 hours PRN Shortness of Breath and/or Wheezing  benzocaine 15 mG/menthol 3.6 mG (Sugar-Free) Lozenge 1 Lozenge Oral four times a day PRN Sore Throat  benzonatate 100 milliGRAM(s) Oral every 8 hours PRN Cough

## 2020-11-20 NOTE — PROGRESS NOTE ADULT - PROBLEM SELECTOR PLAN 1
likely 2/2 COVID19 pneumonia, no PE per CTA  met sepsis criteria  -requiring NRB given hypoxia, will c/w with goal to wean to NC O2 when tolerates. discussed possibility of intubation if needed in the future, which pt is agreeable to.  -c/w decadron iv x 10 days  -c/w remdesivir x 5 days  -prone positioning PRN  -cepacol, tessalon pearle prn  -bld cx 11/19 neg, UA ? positive however pt asymptomatic and thus will not treat.

## 2020-11-20 NOTE — CHART NOTE - NSCHARTNOTEFT_GEN_A_CORE
Medicine PA Episodic Note    Patient is a 60y old  Female who presents with a chief complaint of Acute hypoxemic respiratory failure 2/2 covid 19. Notified by RN that patient desaturating between 80s - 90s while on CPAP. Patient seen and assessed at bedside. Patient lying in semi- prone position, while on cpap.  Patient denies chest pain, abdominal pain. Patient endorses that she does not feel dyspneic.       Vital Signs Last 24 Hrs  T(C): 36.3 (19 Nov 2020 20:15), Max: 38.1 (19 Nov 2020 05:38)  T(F): 97.4 (19 Nov 2020 20:15), Max: 100.6 (19 Nov 2020 05:38)  HR: 66 (19 Nov 2020 22:30) (66 - 104)  BP: 106/66 (19 Nov 2020 20:15) (103/65 - 106/66)  BP(mean): --  RR: 20 (19 Nov 2020 20:15) (19 - 20)  SpO2: 90% (19 Nov 2020 22:30) (89% - 98%)      Labs:                          14.0   6.34  )-----------( 240      ( 19 Nov 2020 06:48 )             43.8     11-19    142  |  105  |  28<H>  ----------------------------<  174<H>  3.6   |  21<L>  |  1.10    Ca    9.2      19 Nov 2020 06:43  Phos  3.4     11-18  Mg     2.1     11-19    TPro  7.4  /  Alb  3.8  /  TBili  0.4  /  DBili  0.1  /  AST  47<H>  /  ALT  35  /  AlkPhos  48  11-19        Radiology:    Physical Exam:  General: WN/WD NAD  Neurology: A&Ox3, nonfocal, HUANG x 4  Head:  Normocephalic, atraumatic  Respiratory:   CV: RRR, S1S2, no murmur  Abdominal: Soft, NT, ND no palpable mass  MSK: No edema, + peripheral pulses, FROM all 4 extremity    Assessment & Plan:  HPI:  60F w/pmhx AARON, HTN, 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 Patient reports that she also had a general feeling of "uneasiness" that she could not explain for the last week. No chest pain. No pleuritic pain. No leg swelling or pain. Denies myalgias. Denies sick contacts. Reports that her  and son live with her and they have tested so far negative for covid and are asymptomatic, patient is not sure where was exposed. Reports trying her symbicort this week for shortness of breath without much relief. Patient yesterday began to have fevers with highest fever at 104F which alarmed her and for that reason she came to the hospital for help.  (18 Nov 2020 05:46)      Plan:  Hypoxic Respiratory Failure 2/2 to COVID  - Patient currently on CPAP.  - Patient respositioned, head of bed elevated, lying in semi- prone position.   - Respiratory therapist assessed, increased patient to FiO2 = 55 --> 75. Patient saturating between  88 - 92.  - Will continue to monitor closely.      Endorse to AM team.   Follow up with Attending in AM.  Emmanuel ACOSTA  Dept of Medicine Medicine PA Episodic Note    Patient is a 60y old  Female who presents with a chief complaint of Acute hypoxemic respiratory failure 2/2 covid 19. Notified by RN that patient desaturating between 80s - 90s while on CPAP. Patient seen and assessed at bedside. Patient lying in semi - prone position, while on cpap. Patient denies chest pain, abdominal pain. Patient endorses that she does not feel dyspneic.       Vital Signs Last 24 Hrs  T(C): 36.3 (19 Nov 2020 20:15), Max: 38.1 (19 Nov 2020 05:38)  T(F): 97.4 (19 Nov 2020 20:15), Max: 100.6 (19 Nov 2020 05:38)  HR: 66 (19 Nov 2020 22:30) (66 - 104)  BP: 106/66 (19 Nov 2020 20:15) (103/65 - 106/66)  BP(mean): --  RR: 20 (19 Nov 2020 20:15) (19 - 20)  SpO2: 90% (19 Nov 2020 22:30) (89% - 98%)      Labs:                          14.0   6.34  )-----------( 240      ( 19 Nov 2020 06:48 )             43.8     11-19    142  |  105  |  28<H>  ----------------------------<  174<H>  3.6   |  21<L>  |  1.10    Ca    9.2      19 Nov 2020 06:43  Phos  3.4     11-18  Mg     2.1     11-19    TPro  7.4  /  Alb  3.8  /  TBili  0.4  /  DBili  0.1  /  AST  47<H>  /  ALT  35  /  AlkPhos  48  11-19        Radiology:    Physical Exam:  General: WN/WD NAD  Neurology: A&Ox3, nonfocal, HUANG x 4  Head:  Normocephalic, atraumatic  Respiratory:   CV: RRR, S1S2, no murmur  Abdominal: Soft, NT, ND no palpable mass  MSK: No edema, + peripheral pulses, FROM all 4 extremity    Assessment & Plan:  HPI:  60F w/pmhx AARON, HTN, 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 Patient reports that she also had a general feeling of "uneasiness" that she could not explain for the last week. No chest pain. No pleuritic pain. No leg swelling or pain. Denies myalgias. Denies sick contacts. Reports that her  and son live with her and they have tested so far negative for covid and are asymptomatic, patient is not sure where was exposed. Reports trying her symbicort this week for shortness of breath without much relief. Patient yesterday began to have fevers with highest fever at 104F which alarmed her and for that reason she came to the hospital for help.  (18 Nov 2020 05:46)    Hypoxic between 80s - 90s.    Plan:  Hypoxic Respiratory Failure 2/2 to COVID  - Patient currently on CPAP.  - Attempted to reposition patient, head of bed elevated, lying in semi- prone position.   - Respiratory therapist reassessed and readjusted CPAP settings, CPAP expiratory pressure --> 12 and FiO2 = 55 --> 75. Patient saturating between  88 - 92.  - Will continue to monitor closely for any signs of respiratory distress or worsening hypoxia  - Will continue to monitor on cont pulse ox and tele.  - Plan d/w RN    Endorse to AM team.   Follow up with Attending in AM.  Emmanuel ACOSTA  Dept of Medicine

## 2020-11-20 NOTE — PROGRESS NOTE ADULT - ASSESSMENT
60F w/ pmhx morbid obesity, AARON, HTN, DVT&PE in the past, ?Asthma now p/w a 9 day hx of cough/SOB, f adm with sepsis 2/2 acute hypoxic respiratory failure d/t COVID+ pneumonia, worsening, now on nonrebreather.

## 2020-11-20 NOTE — PROGRESS NOTE ADULT - PROBLEM SELECTOR PLAN 2
a1c 7, new dx discussed w/ pt  -diabetic RN consult, nutrition c/s  -iss, caleb while on steroids  -plan to dc home on metformin, advise weight loss

## 2020-11-20 NOTE — PROGRESS NOTE ADULT - PROBLEM SELECTOR PLAN 6
dvt ppx: xarelto  no PT needs  called  Scotty, all ques answered  Dispo: ill, requiring NRB mask now, will monitor O2 through the weekend

## 2020-11-21 LAB
ALBUMIN SERPL ELPH-MCNC: 3.7 G/DL — SIGNIFICANT CHANGE UP (ref 3.3–5)
ALP SERPL-CCNC: 54 U/L — SIGNIFICANT CHANGE UP (ref 40–120)
ALT FLD-CCNC: 29 U/L — SIGNIFICANT CHANGE UP (ref 10–45)
ANION GAP SERPL CALC-SCNC: 13 MMOL/L — SIGNIFICANT CHANGE UP (ref 5–17)
AST SERPL-CCNC: 33 U/L — SIGNIFICANT CHANGE UP (ref 10–40)
BASOPHILS # BLD AUTO: 0.01 K/UL — SIGNIFICANT CHANGE UP (ref 0–0.2)
BASOPHILS NFR BLD AUTO: 0.1 % — SIGNIFICANT CHANGE UP (ref 0–2)
BILIRUB DIRECT SERPL-MCNC: 0.2 MG/DL — SIGNIFICANT CHANGE UP (ref 0–0.2)
BILIRUB INDIRECT FLD-MCNC: 0.3 MG/DL — SIGNIFICANT CHANGE UP (ref 0.2–1)
BILIRUB SERPL-MCNC: 0.5 MG/DL — SIGNIFICANT CHANGE UP (ref 0.2–1.2)
BUN SERPL-MCNC: 29 MG/DL — HIGH (ref 7–23)
CALCIUM SERPL-MCNC: 9.4 MG/DL — SIGNIFICANT CHANGE UP (ref 8.4–10.5)
CHLORIDE SERPL-SCNC: 110 MMOL/L — HIGH (ref 96–108)
CO2 SERPL-SCNC: 25 MMOL/L — SIGNIFICANT CHANGE UP (ref 22–31)
CREAT SERPL-MCNC: 0.79 MG/DL — SIGNIFICANT CHANGE UP (ref 0.5–1.3)
CRP SERPL-MCNC: 7.27 MG/DL — HIGH (ref 0–0.4)
D DIMER BLD IA.RAPID-MCNC: 706 NG/ML DDU — HIGH
EOSINOPHIL # BLD AUTO: 0 K/UL — SIGNIFICANT CHANGE UP (ref 0–0.5)
EOSINOPHIL NFR BLD AUTO: 0 % — SIGNIFICANT CHANGE UP (ref 0–6)
FERRITIN SERPL-MCNC: 677 NG/ML — HIGH (ref 15–150)
GAS PNL BLDA: SIGNIFICANT CHANGE UP
GLUCOSE BLDC GLUCOMTR-MCNC: 184 MG/DL — HIGH (ref 70–99)
GLUCOSE BLDC GLUCOMTR-MCNC: 212 MG/DL — HIGH (ref 70–99)
GLUCOSE BLDC GLUCOMTR-MCNC: 232 MG/DL — HIGH (ref 70–99)
GLUCOSE BLDC GLUCOMTR-MCNC: 246 MG/DL — HIGH (ref 70–99)
GLUCOSE SERPL-MCNC: 230 MG/DL — HIGH (ref 70–99)
HCT VFR BLD CALC: 46.1 % — HIGH (ref 34.5–45)
HGB BLD-MCNC: 14.4 G/DL — SIGNIFICANT CHANGE UP (ref 11.5–15.5)
IMM GRANULOCYTES NFR BLD AUTO: 1.5 % — SIGNIFICANT CHANGE UP (ref 0–1.5)
LACTATE SERPL-SCNC: 2 MMOL/L — SIGNIFICANT CHANGE UP (ref 0.7–2)
LYMPHOCYTES # BLD AUTO: 0.5 K/UL — LOW (ref 1–3.3)
LYMPHOCYTES # BLD AUTO: 5 % — LOW (ref 13–44)
MCHC RBC-ENTMCNC: 28.1 PG — SIGNIFICANT CHANGE UP (ref 27–34)
MCHC RBC-ENTMCNC: 31.2 GM/DL — LOW (ref 32–36)
MCV RBC AUTO: 89.9 FL — SIGNIFICANT CHANGE UP (ref 80–100)
MONOCYTES # BLD AUTO: 0.46 K/UL — SIGNIFICANT CHANGE UP (ref 0–0.9)
MONOCYTES NFR BLD AUTO: 4.6 % — SIGNIFICANT CHANGE UP (ref 2–14)
NEUTROPHILS # BLD AUTO: 8.94 K/UL — HIGH (ref 1.8–7.4)
NEUTROPHILS NFR BLD AUTO: 88.8 % — HIGH (ref 43–77)
NRBC # BLD: 0 /100 WBCS — SIGNIFICANT CHANGE UP (ref 0–0)
PLATELET # BLD AUTO: 309 K/UL — SIGNIFICANT CHANGE UP (ref 150–400)
POTASSIUM SERPL-MCNC: 3.8 MMOL/L — SIGNIFICANT CHANGE UP (ref 3.5–5.3)
POTASSIUM SERPL-SCNC: 3.8 MMOL/L — SIGNIFICANT CHANGE UP (ref 3.5–5.3)
PROT SERPL-MCNC: 7.5 G/DL — SIGNIFICANT CHANGE UP (ref 6–8.3)
RBC # BLD: 5.13 M/UL — SIGNIFICANT CHANGE UP (ref 3.8–5.2)
RBC # FLD: 16.2 % — HIGH (ref 10.3–14.5)
SODIUM SERPL-SCNC: 148 MMOL/L — HIGH (ref 135–145)
WBC # BLD: 10.06 K/UL — SIGNIFICANT CHANGE UP (ref 3.8–10.5)
WBC # FLD AUTO: 10.06 K/UL — SIGNIFICANT CHANGE UP (ref 3.8–10.5)

## 2020-11-21 PROCEDURE — 99233 SBSQ HOSP IP/OBS HIGH 50: CPT

## 2020-11-21 RX ADMIN — LISINOPRIL 20 MILLIGRAM(S): 2.5 TABLET ORAL at 09:06

## 2020-11-21 RX ADMIN — RIVAROXABAN 20 MILLIGRAM(S): KIT at 10:00

## 2020-11-21 RX ADMIN — Medication 50 MILLIGRAM(S): at 09:06

## 2020-11-21 RX ADMIN — Medication 2: at 07:52

## 2020-11-21 RX ADMIN — REMDESIVIR 500 MILLIGRAM(S): 5 INJECTION INTRAVENOUS at 18:33

## 2020-11-21 RX ADMIN — ALBUTEROL 2 PUFF(S): 90 AEROSOL, METERED ORAL at 10:00

## 2020-11-21 RX ADMIN — Medication 4: at 11:49

## 2020-11-21 RX ADMIN — Medication 50 MILLIGRAM(S): at 18:33

## 2020-11-21 RX ADMIN — Medication 6 MILLIGRAM(S): at 06:14

## 2020-11-21 RX ADMIN — BUDESONIDE AND FORMOTEROL FUMARATE DIHYDRATE 2 PUFF(S): 160; 4.5 AEROSOL RESPIRATORY (INHALATION) at 09:06

## 2020-11-21 RX ADMIN — Medication 4: at 16:55

## 2020-11-21 NOTE — CHART NOTE - TREATMENT: THE FOLLOWING DIET HAS BEEN RECOMMENDED
Diet, Regular:   Consistent Carbohydrate {Evening Snack} (CSTCHOSN)  DASH/TLC {Sodium & Cholesterol Restricted} (DASH) (11-18-20 @ 06:23) [Active]

## 2020-11-21 NOTE — PROGRESS NOTE ADULT - PROBLEM SELECTOR PLAN 6
dvt ppx: xarelto  no PT needs  Dispo: ill, requiring NRB mask now, will monitor O2 through the weekend

## 2020-11-21 NOTE — PROGRESS NOTE ADULT - SUBJECTIVE AND OBJECTIVE BOX
Hermilo Quezada MD  Division of Hospital Medicine  Pager [phone]  If no response or off hours page: [phone]  ---------------------------------------------------------    AYLAMIGUEL A  60y  Female      Patient is a 60y old  Female who presents with a chief complaint of Acute hypoxemic respiratory failure 2/2 covid-19 + PNA, currently receiving remdesivir + decadron & on non-rebreather mask. Newly diagnosed with T2DM, possible plan for d/c home with metformin per EMR (21 Nov 2020 10:28)      INTERVAL HPI/OVERNIGHT EVENTS:  Sitting in chair. States her breathing is okay when not moving, otherwise she becomes SOB with very little exertion      REVIEW OF SYSTEMS: 10 point ROS negative unless listed above    T(C): 37.1 (11-21-20 @ 11:53), Max: 37.1 (11-21-20 @ 11:53)  HR: 76 (11-21-20 @ 11:53) (65 - 88)  BP: 105/55 (11-21-20 @ 11:53) (105/55 - 122/88)  RR: 20 (11-21-20 @ 12:19) (20 - 21)  SpO2: 86% (11-21-20 @ 12:19) (85% - 95%)  Wt(kg): --Vital Signs Last 24 Hrs  T(C): 37.1 (21 Nov 2020 11:53), Max: 37.1 (21 Nov 2020 11:53)  T(F): 98.8 (21 Nov 2020 11:53), Max: 98.8 (21 Nov 2020 11:53)  HR: 76 (21 Nov 2020 11:53) (65 - 88)  BP: 105/55 (21 Nov 2020 11:53) (105/55 - 122/88)  BP(mean): --  RR: 20 (21 Nov 2020 12:19) (20 - 21)  SpO2: 86% (21 Nov 2020 12:19) (85% - 95%)    PHYSICAL EXAM:  GENERAL: morbidly obese f, sitting in chair w/ nonrebreather mask, in NAD  HEAD:  NCAT  EYES: PERRLA, conjunctiva clear  NECK: Supple  CHEST/LUNG: CTA B/L  HEART: Reg rate. Normal S1, S2. No m/r/g.   ABDOMEN: Soft, NT/ND.   EXTREMITIES:  2+ Peripheral Pulses, No clubbing, cyanosis, edema.  PSYCH: AAOx3, appropriate affect  SKIN: No rashes or lesions      Consultant(s) Notes Reviewed:  [x ] YES  [ ] NO  Care Discussed with Consultants/Other Providers [ x] YES  [ ] NO    LABS:                        14.4   10.06 )-----------( 309      ( 21 Nov 2020 10:14 )             46.1     11-21    148<H>  |  110<H>  |  29<H>  ----------------------------<  230<H>  3.8   |  25  |  0.79    Ca    9.4      21 Nov 2020 10:14  Mg     2.4     11-20    TPro  7.5  /  Alb  3.7  /  TBili  0.5  /  DBili  0.2  /  AST  33  /  ALT  29  /  AlkPhos  54  11-21        CAPILLARY BLOOD GLUCOSE      POCT Blood Glucose.: 246 mg/dL (21 Nov 2020 11:45)  POCT Blood Glucose.: 184 mg/dL (21 Nov 2020 07:44)  POCT Blood Glucose.: 217 mg/dL (20 Nov 2020 21:24)  POCT Blood Glucose.: 231 mg/dL (20 Nov 2020 16:41)            RADIOLOGY & ADDITIONAL TESTS:    Imaging Personally Reviewed:  [ x] YES  [ ] NO

## 2020-11-21 NOTE — CHART NOTE - COMMENTS:
Provided with verbal diet education regarding nutrition recommendations for T2DM. Pt was receptive, follow-up as able

## 2020-11-21 NOTE — DIETITIAN INITIAL EVALUATION ADULT. - CHIEF COMPLAINT
The patient is a 61yo Female complaining of cough, SOB, fever, diarrhea. PMH includes AARON, HTN, DVT/PE, ?asthma, GERD

## 2020-11-21 NOTE — DIETITIAN INITIAL EVALUATION ADULT. - REASON FOR ADMISSION
Acute hypoxemic respiratory failure 2/2 covid-19 + PNA, currently receiving remdesivir + decadron & on non-rebreather mask. Newly diagnosed with T2DM, possible plan for d/c home with metformin per EMR

## 2020-11-21 NOTE — DIETITIAN INITIAL EVALUATION ADULT. - NSPROEDAREADYLEARN_GEN_A_NUR
Unable to conduct a face to face interview or nutrition-focused physical exam due to limited contact restrictions related to Pt's medical condition and isolation precautions. Pt currently reporting SOB/acuteness of illness

## 2020-11-21 NOTE — DIETITIAN INITIAL EVALUATION ADULT. - ADD RECOMMEND
1) Continue to monitor intake, weight, labs, GI tolerance, skin integrity  2) Provide food preferences within dietary restrictions when feasible   3) Encourage good PO intake 4) Reinforce diet education (as above) as able 5) BMI>40 alert placed

## 2020-11-21 NOTE — DIETITIAN INITIAL EVALUATION ADULT. - REASON INDICATOR FOR ASSESSMENT
consult for assessment    Source: Unable to conduct a face to face interview or nutrition-focused physical exam due to limited contact restrictions related to Pt's medical condition and isolation precautions. Information obtained via phone call with pt, from EMR, & previous RD note

## 2020-11-21 NOTE — DIETITIAN INITIAL EVALUATION ADULT. - OTHER INFO
Pt reports good appetite. Per chart, noted with % PO intake x2 meals. Denies chewing/swallowing difficulties. Denies nausea/emesis. Last BM x2 on 11/20, loose/diarrhea-like.     States she was diagnosed with DM during this admission; noted HgbA1c 7.0%. Pt with poor-fair prior knowledge of nutrition recommendations for DM management. Pt amenable to verbal diet education via telephone regarding T2DM. Education included effects of CHOs on BG levels, dietary sources of carbohydrates (i.e. concentrated sweets, starches, dairy, fruit), monitoring portion sizes of carbohydrates using  (closed fist), and including good sources of high biological protein & fiber with carbohydrates during meals/snacks. Educated patient on importance of consistent meal pattern, MyPlate healthy eating model, limiting concentrated sweets in diet, and importance of gradual weight loss in promoting greater BG control. Discussed current diet order: consistent CHO, DASH & what this entails. Recommend f/u with written materials/reinforcement as able.

## 2020-11-21 NOTE — DIETITIAN INITIAL EVALUATION ADULT. - NS FNS REASON FOR WEIGHT CHANG
Pt reports weight gain of ~25pounds since 3/2020 due to less activity & greater kcal consumption since pandemic started. Noted current admit weight 300 pounds. Per previous RD note, pt weighed 265.2 pounds (10/2017) confirming weight gain

## 2020-11-21 NOTE — DIETITIAN INITIAL EVALUATION ADULT. - ORAL INTAKE PTA/DIET HISTORY
Information limited as pt reporting SOB & receiving supplemental O2. Prepares meals at home for self & family, tries to limit Na intake. Confirms allergy to kiwi, denied other food allergies. States she was taking a Multivitamin for women >49yo at home daily.

## 2020-11-21 NOTE — DIETITIAN INITIAL EVALUATION ADULT. - PERSON TAUGHT/METHOD
ask me 3/patient instructed/teach back - (Patient repeats in own words)/Education included effects of CHOs on BG levels, dietary sources of carbohydrates (i.e. concentrated sweets, starches, dairy, fruit), monitoring portion sizes of carbohydrates using  (closed fist), and including good sources of high biological protein & fiber with carbohydrates during meals/snacks. Educated patient on importance of consistent meal pattern, MyPlate healthy eating model, limiting concentrated sweets in diet, and importance of gradual weight loss in promoting greater BG control. Discussed current diet order: consistent CHO, DASH & what this entails. Recommend f/u with written materials/reinforcement as able./verbal instruction

## 2020-11-22 LAB
ALBUMIN SERPL ELPH-MCNC: 3.2 G/DL — LOW (ref 3.3–5)
ALBUMIN SERPL ELPH-MCNC: 3.3 G/DL — SIGNIFICANT CHANGE UP (ref 3.3–5)
ALP SERPL-CCNC: 56 U/L — SIGNIFICANT CHANGE UP (ref 40–120)
ALP SERPL-CCNC: 60 U/L — SIGNIFICANT CHANGE UP (ref 40–120)
ALT FLD-CCNC: 25 U/L — SIGNIFICANT CHANGE UP (ref 10–45)
ALT FLD-CCNC: 25 U/L — SIGNIFICANT CHANGE UP (ref 10–45)
ANION GAP SERPL CALC-SCNC: 11 MMOL/L — SIGNIFICANT CHANGE UP (ref 5–17)
ANION GAP SERPL CALC-SCNC: 12 MMOL/L — SIGNIFICANT CHANGE UP (ref 5–17)
AST SERPL-CCNC: 30 U/L — SIGNIFICANT CHANGE UP (ref 10–40)
AST SERPL-CCNC: 30 U/L — SIGNIFICANT CHANGE UP (ref 10–40)
BASOPHILS # BLD AUTO: 0.02 K/UL — SIGNIFICANT CHANGE UP (ref 0–0.2)
BASOPHILS # BLD AUTO: 0.02 K/UL — SIGNIFICANT CHANGE UP (ref 0–0.2)
BASOPHILS NFR BLD AUTO: 0.2 % — SIGNIFICANT CHANGE UP (ref 0–2)
BASOPHILS NFR BLD AUTO: 0.2 % — SIGNIFICANT CHANGE UP (ref 0–2)
BILIRUB DIRECT SERPL-MCNC: 0.1 MG/DL — SIGNIFICANT CHANGE UP (ref 0–0.2)
BILIRUB INDIRECT FLD-MCNC: 0.4 MG/DL — SIGNIFICANT CHANGE UP (ref 0.2–1)
BILIRUB SERPL-MCNC: 0.4 MG/DL — SIGNIFICANT CHANGE UP (ref 0.2–1.2)
BILIRUB SERPL-MCNC: 0.5 MG/DL — SIGNIFICANT CHANGE UP (ref 0.2–1.2)
BUN SERPL-MCNC: 21 MG/DL — SIGNIFICANT CHANGE UP (ref 7–23)
BUN SERPL-MCNC: 23 MG/DL — SIGNIFICANT CHANGE UP (ref 7–23)
CALCIUM SERPL-MCNC: 9 MG/DL — SIGNIFICANT CHANGE UP (ref 8.4–10.5)
CALCIUM SERPL-MCNC: 9 MG/DL — SIGNIFICANT CHANGE UP (ref 8.4–10.5)
CHLORIDE SERPL-SCNC: 110 MMOL/L — HIGH (ref 96–108)
CHLORIDE SERPL-SCNC: 111 MMOL/L — HIGH (ref 96–108)
CO2 SERPL-SCNC: 24 MMOL/L — SIGNIFICANT CHANGE UP (ref 22–31)
CO2 SERPL-SCNC: 24 MMOL/L — SIGNIFICANT CHANGE UP (ref 22–31)
CREAT SERPL-MCNC: 0.62 MG/DL — SIGNIFICANT CHANGE UP (ref 0.5–1.3)
CREAT SERPL-MCNC: 0.64 MG/DL — SIGNIFICANT CHANGE UP (ref 0.5–1.3)
EOSINOPHIL # BLD AUTO: 0.01 K/UL — SIGNIFICANT CHANGE UP (ref 0–0.5)
EOSINOPHIL # BLD AUTO: 0.02 K/UL — SIGNIFICANT CHANGE UP (ref 0–0.5)
EOSINOPHIL NFR BLD AUTO: 0.1 % — SIGNIFICANT CHANGE UP (ref 0–6)
EOSINOPHIL NFR BLD AUTO: 0.2 % — SIGNIFICANT CHANGE UP (ref 0–6)
GAS PNL BLDA: SIGNIFICANT CHANGE UP
GLUCOSE BLDC GLUCOMTR-MCNC: 210 MG/DL — HIGH (ref 70–99)
GLUCOSE BLDC GLUCOMTR-MCNC: 260 MG/DL — HIGH (ref 70–99)
GLUCOSE SERPL-MCNC: 179 MG/DL — HIGH (ref 70–99)
GLUCOSE SERPL-MCNC: 215 MG/DL — HIGH (ref 70–99)
HCT VFR BLD CALC: 44.2 % — SIGNIFICANT CHANGE UP (ref 34.5–45)
HCT VFR BLD CALC: 44.8 % — SIGNIFICANT CHANGE UP (ref 34.5–45)
HGB BLD-MCNC: 13.8 G/DL — SIGNIFICANT CHANGE UP (ref 11.5–15.5)
HGB BLD-MCNC: 13.8 G/DL — SIGNIFICANT CHANGE UP (ref 11.5–15.5)
IMM GRANULOCYTES NFR BLD AUTO: 1.6 % — HIGH (ref 0–1.5)
IMM GRANULOCYTES NFR BLD AUTO: 1.7 % — HIGH (ref 0–1.5)
LACTATE SERPL-SCNC: 1.4 MMOL/L — SIGNIFICANT CHANGE UP (ref 0.7–2)
LYMPHOCYTES # BLD AUTO: 0.72 K/UL — LOW (ref 1–3.3)
LYMPHOCYTES # BLD AUTO: 0.92 K/UL — LOW (ref 1–3.3)
LYMPHOCYTES # BLD AUTO: 6.5 % — LOW (ref 13–44)
LYMPHOCYTES # BLD AUTO: 7.9 % — LOW (ref 13–44)
MAGNESIUM SERPL-MCNC: 2.2 MG/DL — SIGNIFICANT CHANGE UP (ref 1.6–2.6)
MCHC RBC-ENTMCNC: 27.6 PG — SIGNIFICANT CHANGE UP (ref 27–34)
MCHC RBC-ENTMCNC: 27.6 PG — SIGNIFICANT CHANGE UP (ref 27–34)
MCHC RBC-ENTMCNC: 30.8 GM/DL — LOW (ref 32–36)
MCHC RBC-ENTMCNC: 31.2 GM/DL — LOW (ref 32–36)
MCV RBC AUTO: 88.4 FL — SIGNIFICANT CHANGE UP (ref 80–100)
MCV RBC AUTO: 89.6 FL — SIGNIFICANT CHANGE UP (ref 80–100)
MONOCYTES # BLD AUTO: 0.78 K/UL — SIGNIFICANT CHANGE UP (ref 0–0.9)
MONOCYTES # BLD AUTO: 0.83 K/UL — SIGNIFICANT CHANGE UP (ref 0–0.9)
MONOCYTES NFR BLD AUTO: 7.1 % — SIGNIFICANT CHANGE UP (ref 2–14)
MONOCYTES NFR BLD AUTO: 7.2 % — SIGNIFICANT CHANGE UP (ref 2–14)
NEUTROPHILS # BLD AUTO: 9.31 K/UL — HIGH (ref 1.8–7.4)
NEUTROPHILS # BLD AUTO: 9.61 K/UL — HIGH (ref 1.8–7.4)
NEUTROPHILS NFR BLD AUTO: 82.9 % — HIGH (ref 43–77)
NEUTROPHILS NFR BLD AUTO: 84.4 % — HIGH (ref 43–77)
NRBC # BLD: 0 /100 WBCS — SIGNIFICANT CHANGE UP (ref 0–0)
NRBC # BLD: 0 /100 WBCS — SIGNIFICANT CHANGE UP (ref 0–0)
PHOSPHATE SERPL-MCNC: 3.5 MG/DL — SIGNIFICANT CHANGE UP (ref 2.5–4.5)
PLATELET # BLD AUTO: 308 K/UL — SIGNIFICANT CHANGE UP (ref 150–400)
PLATELET # BLD AUTO: 321 K/UL — SIGNIFICANT CHANGE UP (ref 150–400)
POTASSIUM SERPL-MCNC: 4 MMOL/L — SIGNIFICANT CHANGE UP (ref 3.5–5.3)
POTASSIUM SERPL-MCNC: 4.1 MMOL/L — SIGNIFICANT CHANGE UP (ref 3.5–5.3)
POTASSIUM SERPL-SCNC: 4 MMOL/L — SIGNIFICANT CHANGE UP (ref 3.5–5.3)
POTASSIUM SERPL-SCNC: 4.1 MMOL/L — SIGNIFICANT CHANGE UP (ref 3.5–5.3)
PROT SERPL-MCNC: 6.7 G/DL — SIGNIFICANT CHANGE UP (ref 6–8.3)
PROT SERPL-MCNC: 6.8 G/DL — SIGNIFICANT CHANGE UP (ref 6–8.3)
RBC # BLD: 5 M/UL — SIGNIFICANT CHANGE UP (ref 3.8–5.2)
RBC # BLD: 5 M/UL — SIGNIFICANT CHANGE UP (ref 3.8–5.2)
RBC # FLD: 16.2 % — HIGH (ref 10.3–14.5)
RBC # FLD: 16.3 % — HIGH (ref 10.3–14.5)
SODIUM SERPL-SCNC: 146 MMOL/L — HIGH (ref 135–145)
SODIUM SERPL-SCNC: 146 MMOL/L — HIGH (ref 135–145)
WBC # BLD: 11.03 K/UL — HIGH (ref 3.8–10.5)
WBC # BLD: 11.58 K/UL — HIGH (ref 3.8–10.5)
WBC # FLD AUTO: 11.03 K/UL — HIGH (ref 3.8–10.5)
WBC # FLD AUTO: 11.58 K/UL — HIGH (ref 3.8–10.5)

## 2020-11-22 PROCEDURE — 99233 SBSQ HOSP IP/OBS HIGH 50: CPT | Mod: GC

## 2020-11-22 PROCEDURE — 99222 1ST HOSP IP/OBS MODERATE 55: CPT | Mod: GC

## 2020-11-22 PROCEDURE — 99233 SBSQ HOSP IP/OBS HIGH 50: CPT

## 2020-11-22 RX ORDER — ACETAMINOPHEN 500 MG
650 TABLET ORAL ONCE
Refills: 0 | Status: COMPLETED | OUTPATIENT
Start: 2020-11-22 | End: 2020-11-22

## 2020-11-22 RX ORDER — IPRATROPIUM/ALBUTEROL SULFATE 18-103MCG
3 AEROSOL WITH ADAPTER (GRAM) INHALATION ONCE
Refills: 0 | Status: COMPLETED | OUTPATIENT
Start: 2020-11-22 | End: 2020-11-22

## 2020-11-22 RX ORDER — FUROSEMIDE 40 MG
40 TABLET ORAL ONCE
Refills: 0 | Status: COMPLETED | OUTPATIENT
Start: 2020-11-22 | End: 2020-11-22

## 2020-11-22 RX ADMIN — Medication 6: at 12:08

## 2020-11-22 RX ADMIN — BUDESONIDE AND FORMOTEROL FUMARATE DIHYDRATE 2 PUFF(S): 160; 4.5 AEROSOL RESPIRATORY (INHALATION) at 22:54

## 2020-11-22 RX ADMIN — Medication 4: at 17:47

## 2020-11-22 RX ADMIN — Medication 650 MILLIGRAM(S): at 22:54

## 2020-11-22 RX ADMIN — REMDESIVIR 500 MILLIGRAM(S): 5 INJECTION INTRAVENOUS at 17:48

## 2020-11-22 RX ADMIN — Medication 40 MILLIGRAM(S): at 04:54

## 2020-11-22 RX ADMIN — RIVAROXABAN 20 MILLIGRAM(S): KIT at 12:14

## 2020-11-22 RX ADMIN — Medication 6 MILLIGRAM(S): at 03:55

## 2020-11-22 RX ADMIN — ALBUTEROL 2 PUFF(S): 90 AEROSOL, METERED ORAL at 17:02

## 2020-11-22 RX ADMIN — Medication 50 MILLIGRAM(S): at 17:02

## 2020-11-22 RX ADMIN — LISINOPRIL 20 MILLIGRAM(S): 2.5 TABLET ORAL at 12:14

## 2020-11-22 RX ADMIN — Medication 650 MILLIGRAM(S): at 22:17

## 2020-11-22 RX ADMIN — Medication 4: at 08:15

## 2020-11-22 NOTE — PROVIDER CONTACT NOTE (OTHER) - ASSESSMENT
pt not in any distress
Pt AAOx4, NAD.
Pt AAOx4, in distress.
Pt AO4, RR20, denies cp, sob palpitations or dizziness, satting 87-90% in semi-proned position.

## 2020-11-22 NOTE — PROGRESS NOTE ADULT - PROBLEM SELECTOR PLAN 6
dvt ppx: xarelto  no PT needs  Dispo: ill, requiring NRB and AVAPS now, will monitor O2 through the weekend

## 2020-11-22 NOTE — CONSULT NOTE ADULT - SUBJECTIVE AND OBJECTIVE BOX
59 yo F with past medical history significant for HTN, DVT on xarelto, AARON (on CPAP) who presented on 11/17/20 with 1 day of fever (tmax of 104) and 10 days of dry cough and NB diarrhea (2-3 episodes/day). Previously on home O2 2-3 yrs ago after she had PE - needed 3L at that time - stopped using it since she started CPAP at night. Measures Spo2 regularly at home, states she usually sats about 90-91 percent. In the ED, she improved on nasal cannula oxygen 4L. Admitted for acute hypoxic respiratory failure 2/2 COVID pneumonia; started on remdesivir x 5 days, decadron and ISS.      Today she was found to become  SOB with very little exertion. Rapid response called at 22:11 on 11/21/2020 for hypoxia with saturation in the 70s while on NRB. Started on CPAP initially, though no improvement therefore transitioned to BiPAP 12/25. Patient remained tachypneic. BiPAP increased to 15/7. ABG revealed pCO2 of 47, pO2 60. Patient remained tachypneic with RR up to 44 while on BiPAP on 70% FiO2 after being on BiPAP for 2 hours. She is full code.     Respiratory  #Acute hypoxemic respiratory failure due to COVID-19.  Plan: likely 2/2 COVID19 pneumonia, no PE per CTA  met sepsis criteria  -requiring NRB given hypoxia, will c/w with goal to wean to NC O2 when tolerates. discussed possibility of intubation if needed in the future, which pt is agreeable to.  -c/w decadron iv x 10 days  -c/w remdesivir x 5 days  -prone positioning PRN  -cepacol, tessalon pearle prn  -bld cx 11/19 neg, UA ? positive however pt asymptomatic and thus will not treat.   #AARON (Obstructive Sleep Apnea).   - CPAP qhs, pt has air purifier in the room.   #Mild intermittent asthma, unspecified whether complicated.   - Symbicort and prn albuterol for now in setting of COVID19 pneumonia.     Endocrine  #Type 2 diabetes mellitus without complication, without long-term current use of insulin.  Plan: a1c 7, new dx discussed w/ pt  -diabetic RN consult, nutrition c/s  -iss, caleb while on steroids  -plan to dc home on metformin, advise weight loss.     GI  #Diarrhea  - Likely 2/2 COVID19, no further diarrhea since coming to hospital  - CTM    Heme  - Prior DVT on Xorelto CHIEF COMPLAINT: SOB    HPI: 59 yo F with past medical history significant for HTN, DVT on xarelto, AARON (on CPAP) who presented on 20 with 1 day of fever (tmax of 104) and 10 days of dry cough and NB diarrhea (2-3 episodes/day). Previously on home O2 2-3 yrs ago after she had PE - needed 3L at that time - stopped using it since she started CPAP at night. Measures Spo2 regularly at home, states she usually sats about 90-91 percent. In the ED, she improved on nasal cannula oxygen 4L. Admitted for acute hypoxic respiratory failure 2/2 COVID pneumonia; started on remdesivir x 5 days, decadron and ISS.      Today she was found to become SOB with very little exertion. Rapid response called at 22:11 on 2020 for hypoxia with saturation in the 70s while on NRB. Started on CPAP initially, though no improvement therefore transitioned to BiPAP /. Patient remained tachypneic. BiPAP increased to 15/7. ABG revealed pCO2 of 47, pO2 60. Patient remained tachypneic with RR up to 44 while on BiPAP on 70% FiO2 after being on BiPAP for 2 hours. She is full code. MICU consulted for hypoxemic respiratory failure.    PAST MEDICAL & SURGICAL HISTORY:  Pneumomediastinum    Umbilical hernia  Reduciable    Osteoarthritis of both knees, unspecified osteoarthritis type    AARON (Obstructive Sleep Apnea)  category II pt uses c/pap pt to bering to hospital/  OR booking notified    Pneumonia      GERD (Gastroesophageal Reflux Disease)    Asthma  diagnosed   on adbvair denies attacks    DVT (Deep Venous Thrombosis)  left leg 6 m s/p knee replacement in     HTN (Hypertension)    H/O ileostomy  Ileostomy Revesal     S/P LEEP  2000    S/P Exploratory Laparotomy    peritonitis  s/p right knee replacement/ colon resection and ileostomy    S/P Colonoscopy      S/P Endoscopy   gerd    S/P  Section      History of Tubal Ligation  s/p c/section     S/P Knee Surgery  2010        FAMILY HISTORY:  Family history of gastric cancer    Family history of breast cancer (Grandparent)        Allergies    Kiwi (Unknown)  latex (Anaphylaxis)  latex (Unknown)  penicillin (Other)  penicillin (Unknown)  perfume  hives (Other)  potassium acetate (Other)  soap additives hives (Other)    Intolerances        HOME MEDICATIONS:    REVIEW OF SYSTEMS:  Limited ROS due to Bipap use and COVID status. Patient denies worsening SOB.      OBJECTIVE:  ICU Vital Signs Last 24 Hrs  T(C): 36.8 (2020 20:30), Max: 37.1 (2020 11:53)  T(F): 98.2 (2020 20:30), Max: 98.8 (2020 11:53)  HR: 62 (2020 22:30) (62 - 88)  BP: 134/78 (2020 20:30) (105/55 - 134/78)  BP(mean): --  ABP: --  ABP(mean): --  RR: 44 (2020 23:30) (18 - 44)  SpO2: 86% (2020 23:30) (84% - 95%)         @ 07:  -   @ 07:00  --------------------------------------------------------  IN: 600 mL / OUT: 0 mL / NET: 600 mL     @ 07:  -   @ 00:44  --------------------------------------------------------  IN: 680 mL / OUT: 600 mL / NET: 80 mL      CAPILLARY BLOOD GLUCOSE      POCT Blood Glucose.: 212 mg/dL (2020 21:44)      PHYSICAL EXAM:    Limited physical exam performed due to COVID status    Gen: tachypneic on BiPap, no acute distress, obese  HEENT: atraumatic, normocephalic,  CV: regular rate and rhythym  Resp: tachypneic   MSK: extremities atraumatic, no cyanosis or clubbing  Skin: no rashes or lesions  Neuro: no focal deficits  Psych: alert and oriented x3, appropriate mood and affect      HOSPITAL MEDICATIONS:  MEDICATIONS  (STANDING):  budesonide  80 MICROgram(s)/formoterol 4.5 MICROgram(s) Inhaler 2 Puff(s) Inhalation two times a day  dexAMETHasone  Injectable 6 milliGRAM(s) IV Push daily  dextrose 40% Gel 15 Gram(s) Oral once  dextrose 5%. 1000 milliLiter(s) (50 mL/Hr) IV Continuous <Continuous>  dextrose 5%. 1000 milliLiter(s) (100 mL/Hr) IV Continuous <Continuous>  dextrose 50% Injectable 25 Gram(s) IV Push once  dextrose 50% Injectable 12.5 Gram(s) IV Push once  dextrose 50% Injectable 25 Gram(s) IV Push once  glucagon  Injectable 1 milliGRAM(s) IntraMuscular once  insulin lispro (ADMELOG) corrective regimen sliding scale   SubCutaneous three times a day before meals  insulin lispro (ADMELOG) corrective regimen sliding scale   SubCutaneous at bedtime  lisinopril 20 milliGRAM(s) Oral daily  metoprolol tartrate 50 milliGRAM(s) Oral two times a day  remdesivir  IVPB   IV Intermittent   remdesivir  IVPB 100 milliGRAM(s) IV Intermittent every 24 hours  rivaroxaban 20 milliGRAM(s) Oral with dinner    MEDICATIONS  (PRN):  ALBUTerol    90 MICROgram(s) HFA Inhaler 2 Puff(s) Inhalation every 6 hours PRN Shortness of Breath and/or Wheezing  benzocaine 15 mG/menthol 3.6 mG (Sugar-Free) Lozenge 1 Lozenge Oral four times a day PRN Sore Throat  benzonatate 100 milliGRAM(s) Oral every 8 hours PRN Cough      LABS:                        13.8   11.03 )-----------( 321      ( 2020 00:13 )             44.2         148<H>  |  110<H>  |  29<H>  ----------------------------<  230<H>  3.8   |  25  |  0.79    Ca    9.4      2020 10:14  Mg     2.4         TPro  7.5  /  Alb  3.7  /  TBili  0.5  /  DBili  0.2  /  AST  33  /  ALT  29  /  AlkPhos  54          Arterial Blood Gas:   @ 23:59  7.37/47/60/27/88/1.6  ABG lactate: --        MICROBIOLOGY:     RADIOLOGY:  [ ] Reviewed and interpreted by me    EKG:

## 2020-11-22 NOTE — PROGRESS NOTE ADULT - ASSESSMENT
60F w/ pmhx morbid obesity, AARON, HTN, DVT&PE in the past, ?Asthma now p/w a 9 day hx of cough/SOB, f adm with sepsis 2/2 acute hypoxic respiratory failure d/t COVID+ pneumonia, worsening, on nonrebreather and AVAPS.

## 2020-11-22 NOTE — PROVIDER CONTACT NOTE (OTHER) - BACKGROUND
Pt admitted for COVID-19 infection.
Pt admitted for COVID-19 infection.
covid +   cpap at home at night

## 2020-11-22 NOTE — PROVIDER CONTACT NOTE (OTHER) - SITUATION
I was tiold pt was desating. Pt was found in chair, cyanotic, Tachypnic (40's) and w/ c/o SOB w/ O2 sat of 65% on NRB15L. Pt put in bed and placed on CPAP. Still hypoxic in 70's. RRT initiated

## 2020-11-22 NOTE — CHART NOTE - NSCHARTNOTEFT_GEN_A_CORE
Tachypneic    Spoke with ICU Rufus Espinal MD, PGY-2 Resident at 0655 am regarding patient's condition has not improved however not worsened. per Dr. Crabtree, ICU will not take patient at this time will reconsult if condition changes.     Vital Signs Last 24 Hrs  T(C): 36.6 (22 Nov 2020 04:50), Max: 37.1 (21 Nov 2020 11:53)  T(F): 97.9 (22 Nov 2020 04:50), Max: 98.8 (21 Nov 2020 11:53)  HR: 65 (22 Nov 2020 06:39) (61 - 88)  BP: 142/79 (22 Nov 2020 04:50) (105/55 - 142/79)  BP(mean): --  RR: 51 (22 Nov 2020 04:50) (18 - 51)  SpO2: 87% (22 Nov 2020 06:39) (65% - 91%)    Will endorse to primary day team, attending to follow    Citlali Heath NP  spectralink [phone]

## 2020-11-22 NOTE — PROVIDER CONTACT NOTE (OTHER) - ACTION/TREATMENT ORDERED:
CPAP changed to BiPAP, O2 sat back up to mid 80's. Labs including ABG 1 hour post RRT.
CPAP placed on patient, will monitor.
MICU consulted. CPAP changed to AVAP. Placed in L lateral posistion. Instructed by NP to call rapid if any deterioration.
PA aware, at bedside, continue to monitor, settings adjusted, pt continues to remain in semiproned position and advised not to ambulate overnight due to oxygen saturation levels, pt verbalized underst

## 2020-11-22 NOTE — PROGRESS NOTE ADULT - SUBJECTIVE AND OBJECTIVE BOX
Hermilo Quezada MD  Division of Hospital Medicine  Pager [phone]  If no response or off hours page: [phone]  ---------------------------------------------------------    AGUILAMIGUEL A  60y  Female      Patient is a 60y old  Female who presents with a chief complaint of Acute hypoxemic respiratory failure 2/2 covid19 (22 Nov 2020 00:26)      INTERVAL HPI/OVERNIGHT EVENTS:  RRT and MICU called overnight for desaturation and increased work of breathing. This am on my exam, pt was sitting in chair on NRB attempting to eat breakfast. States she denney snot feel SOB at rest on when exerting herself.        REVIEW OF SYSTEMS: 10 point ROS negative unless listed above    T(C): 37.1 (11-22-20 @ 11:55), Max: 37.1 (11-22-20 @ 11:55)  HR: 78 (11-22-20 @ 12:13) (61 - 96)  BP: 138/61 (11-22-20 @ 11:55) (133/77 - 142/79)  RR: 18 (11-22-20 @ 11:55) (18 - 51)  SpO2: 97% (11-22-20 @ 12:13) (65% - 97%)  Wt(kg): --Vital Signs Last 24 Hrs  T(C): 37.1 (22 Nov 2020 11:55), Max: 37.1 (22 Nov 2020 11:55)  T(F): 98.7 (22 Nov 2020 11:55), Max: 98.7 (22 Nov 2020 11:55)  HR: 78 (22 Nov 2020 12:13) (61 - 96)  BP: 138/61 (22 Nov 2020 11:55) (133/77 - 142/79)  BP(mean): --  RR: 18 (22 Nov 2020 11:55) (18 - 51)  SpO2: 97% (22 Nov 2020 12:13) (65% - 97%)    PHYSICAL EXAM:  GENERAL: morbidly obese f, sitting in chair w/ nonrebreather mask, in NAD  HEAD:  NCAT  EYES: PERRLA, conjunctiva clear  NECK: Supple  CHEST/LUNG: CTA B/L  HEART: Reg rate. Normal S1, S2. No m/r/g.   ABDOMEN: Soft, NT/ND.   EXTREMITIES:  2+ Peripheral Pulses, No clubbing, cyanosis, edema.  PSYCH: AAOx3, appropriate affect    Consultant(s) Notes Reviewed:  [x ] YES  [ ] NO  Care Discussed with Consultants/Other Providers [ x] YES  [ ] NO    LABS:                        13.8   11.58 )-----------( 308      ( 22 Nov 2020 05:58 )             44.8     11-22    146<H>  |  110<H>  |  21  ----------------------------<  179<H>  4.1   |  24  |  0.62    Ca    9.0      22 Nov 2020 05:58  Phos  3.5     11-22  Mg     2.2     11-22    TPro  6.7  /  Alb  3.2<L>  /  TBili  0.5  /  DBili  0.1  /  AST  30  /  ALT  25  /  AlkPhos  60  11-22        CAPILLARY BLOOD GLUCOSE      POCT Blood Glucose.: 260 mg/dL (22 Nov 2020 12:05)  POCT Blood Glucose.: 210 mg/dL (22 Nov 2020 08:14)  POCT Blood Glucose.: 212 mg/dL (21 Nov 2020 21:44)  POCT Blood Glucose.: 232 mg/dL (21 Nov 2020 16:52)      ABG - ( 22 Nov 2020 05:00 )  pH, Arterial: 7.40  pH, Blood: x     /  pCO2: 44    /  pO2: 62    / HCO3: 27    / Base Excess: 2.4   /  SaO2: 91                    RADIOLOGY & ADDITIONAL TESTS:    Imaging Personally Reviewed:  [x ] YES  [ ] NO

## 2020-11-22 NOTE — CONSULT NOTE ADULT - ASSESSMENT
61 yo F with past medical history significant for HTN, DVT/PE on xarelto, AARON (on CPAP), admitted for acute hypoxic respiratory failure 2/2 COVID pneumonia. MICU consulted for worsening hypoxemic respiratory failure on BiPap.     #acute hypoxemic respiratory failure 2/2 COVID PNA  -Patient satting mid 80's on BiPap 15/7, RR 16, FiO2 100%  -ABG 7.37/47/60/27  -Tachypneic to low 40's but appears comfortable, AAOx3, denies worsening SOB  -No indication for intubation or transfer to COVID ICU at this moment  -Would recommend attempt to prone patient  -Can also try AVAPS  -Closely monitor patient on the floor for now    Case discussed with Dr. Eligio Espinal MD, PGY-2 Resident  Department of Internal Medicine  Extension: [phone]  Email: [email or web address]   59 yo F with past medical history significant for HTN, DVT/PE on xarelto, AARON (on CPAP), admitted for acute hypoxic respiratory failure 2/2 COVID pneumonia. MICU consulted for worsening hypoxemic respiratory failure on BiPap.     #acute hypoxemic respiratory failure 2/2 COVID PNA  -Patient satting mid 80's on BiPap 15/7, RR 16, FiO2 100%  -ABG 7.37/47/60/27  -Tachypneic to low 40's but appears comfortable, AAOx3, denies worsening SOB  -No indication for intubation or transfer to COVID ICU at this moment  -Would recommend attempt to prone patient  -Can also try AVAPS  -Goal SpO2 >90%  -Closely monitor patient on the floor for now    Case discussed with Dr. Eligio Espinal MD, PGY-2 Resident  Department of Internal Medicine  Extension: [phone]  Email: [email or web address]

## 2020-11-22 NOTE — CHART NOTE - NSCHARTNOTEFT_GEN_A_CORE
Events overnight    Patient was s/p RRT and placed on BiPAP for Hypoxia (please see detailed RRT note). Patient remained tachypneic and hypoxic around mid 80's and would intermittently increase o2 sat when speaking. MICU consult called not a candidate at this time. Patient changed to Non invasive AVAPS.  Covering night hospitalist made aware of the above.    Vital Signs Last 24 Hrs  T(C): 36.6 (22 Nov 2020 04:50), Max: 37.1 (21 Nov 2020 11:53)  T(F): 97.9 (22 Nov 2020 04:50), Max: 98.8 (21 Nov 2020 11:53)  HR: 68 (22 Nov 2020 04:50) (61 - 88)  BP: 142/79 (22 Nov 2020 04:50) (105/55 - 142/79)  BP(mean): --  RR: 51 (22 Nov 2020 04:50) (18 - 51)  SpO2: 86% (22 Nov 2020 04:50) (65% - 95%)  Minor improvement with ABG after setting changed to AVAPS    Blood Gas Profile - Arterial (11.22.20 @ 05:00)   pH, Arterial: 7.40   pCO2, Arterial: 44 mmHg   pO2, Arterial: 62 mmHg   HCO3, Arterial: 27 mmol/L   Base Excess, Arterial: 2.4 mmol/L   Oxygen Saturation, Arterial: 91 %   Total CO2, Arterial: 28 mmoL/L   FIO2, Arterial: 100     Blood Gas Profile - Arterial (11.21.20 @ 23:59)   pH, Arterial: 7.37   pCO2, Arterial: 47 mmHg   pO2, Arterial: 60 mmHg   HCO3, Arterial: 27 mmoL/L   Base Excess, Arterial: 1.6 mmol/L   Oxygen Saturation, Arterial: 88 %   Total CO2, Arterial: 28 mmoL/L   FIO2, Arterial: 100   Blood Gas Source Arterial: Arterial      Plan  s/p Lasix 40mg  Decadron dose given early  ABGx2 sets  Albuterol neb ordered  ICU made aware of condition after changed to AVAPs still tachypneic  continuos monitoring.    Spoke with , informed him of the above.    Will endorse to primary day NP, attending to follow    Citlali Heath NP  spectralink [phone]

## 2020-11-23 LAB
ALBUMIN SERPL ELPH-MCNC: 2.9 G/DL — LOW (ref 3.3–5)
ALP SERPL-CCNC: 66 U/L — SIGNIFICANT CHANGE UP (ref 40–120)
ALT FLD-CCNC: 27 U/L — SIGNIFICANT CHANGE UP (ref 10–45)
ANION GAP SERPL CALC-SCNC: 13 MMOL/L — SIGNIFICANT CHANGE UP (ref 5–17)
APPEARANCE UR: ABNORMAL
APTT BLD: 36.1 SEC — HIGH (ref 27.5–35.5)
APTT BLD: 45.8 SEC — HIGH (ref 27.5–35.5)
APTT BLD: 60.1 SEC — HIGH (ref 27.5–35.5)
AST SERPL-CCNC: 52 U/L — HIGH (ref 10–40)
BACTERIA # UR AUTO: NEGATIVE — SIGNIFICANT CHANGE UP
BASE EXCESS BLDA CALC-SCNC: 7.3 MMOL/L — HIGH (ref -2–2)
BASE EXCESS BLDV CALC-SCNC: 0 MMOL/L — SIGNIFICANT CHANGE UP (ref -2–2)
BASE EXCESS BLDV CALC-SCNC: 5.8 MMOL/L — HIGH (ref -2–2)
BASOPHILS # BLD AUTO: 0.03 K/UL — SIGNIFICANT CHANGE UP (ref 0–0.2)
BASOPHILS NFR BLD AUTO: 0.2 % — SIGNIFICANT CHANGE UP (ref 0–2)
BILIRUB SERPL-MCNC: 0.7 MG/DL — SIGNIFICANT CHANGE UP (ref 0.2–1.2)
BILIRUB UR-MCNC: NEGATIVE — SIGNIFICANT CHANGE UP
BUN SERPL-MCNC: 23 MG/DL — SIGNIFICANT CHANGE UP (ref 7–23)
CA-I SERPL-SCNC: 1.15 MMOL/L — SIGNIFICANT CHANGE UP (ref 1.12–1.3)
CA-I SERPL-SCNC: 1.19 MMOL/L — SIGNIFICANT CHANGE UP (ref 1.12–1.3)
CALCIUM SERPL-MCNC: 9 MG/DL — SIGNIFICANT CHANGE UP (ref 8.4–10.5)
CHLORIDE BLDV-SCNC: 105 MMOL/L — SIGNIFICANT CHANGE UP (ref 96–108)
CHLORIDE BLDV-SCNC: 105 MMOL/L — SIGNIFICANT CHANGE UP (ref 96–108)
CHLORIDE SERPL-SCNC: 105 MMOL/L — SIGNIFICANT CHANGE UP (ref 96–108)
CO2 BLDA-SCNC: 38 MMOL/L — HIGH (ref 22–30)
CO2 BLDV-SCNC: 32 MMOL/L — HIGH (ref 22–30)
CO2 BLDV-SCNC: 36 MMOL/L — HIGH (ref 22–30)
CO2 SERPL-SCNC: 25 MMOL/L — SIGNIFICANT CHANGE UP (ref 22–31)
COLOR SPEC: SIGNIFICANT CHANGE UP
CREAT SERPL-MCNC: 0.53 MG/DL — SIGNIFICANT CHANGE UP (ref 0.5–1.3)
DIFF PNL FLD: ABNORMAL
EOSINOPHIL # BLD AUTO: 0.07 K/UL — SIGNIFICANT CHANGE UP (ref 0–0.5)
EOSINOPHIL NFR BLD AUTO: 0.5 % — SIGNIFICANT CHANGE UP (ref 0–6)
EPI CELLS # UR: 10 /HPF — HIGH
GAS PNL BLDA: SIGNIFICANT CHANGE UP
GAS PNL BLDV: 142 MMOL/L — SIGNIFICANT CHANGE UP (ref 135–145)
GAS PNL BLDV: 147 MMOL/L — HIGH (ref 135–145)
GAS PNL BLDV: SIGNIFICANT CHANGE UP
GLUCOSE BLDC GLUCOMTR-MCNC: 192 MG/DL — HIGH (ref 70–99)
GLUCOSE BLDC GLUCOMTR-MCNC: 220 MG/DL — HIGH (ref 70–99)
GLUCOSE BLDC GLUCOMTR-MCNC: 242 MG/DL — HIGH (ref 70–99)
GLUCOSE BLDC GLUCOMTR-MCNC: 265 MG/DL — HIGH (ref 70–99)
GLUCOSE BLDC GLUCOMTR-MCNC: 308 MG/DL — HIGH (ref 70–99)
GLUCOSE BLDC GLUCOMTR-MCNC: 339 MG/DL — HIGH (ref 70–99)
GLUCOSE BLDC GLUCOMTR-MCNC: 352 MG/DL — HIGH (ref 70–99)
GLUCOSE BLDV-MCNC: 285 MG/DL — HIGH (ref 70–99)
GLUCOSE BLDV-MCNC: 355 MG/DL — HIGH (ref 70–99)
GLUCOSE SERPL-MCNC: 242 MG/DL — HIGH (ref 70–99)
GLUCOSE UR QL: NEGATIVE — SIGNIFICANT CHANGE UP
HCO3 BLDA-SCNC: 36 MMOL/L — HIGH (ref 21–29)
HCO3 BLDV-SCNC: 30 MMOL/L — HIGH (ref 21–29)
HCO3 BLDV-SCNC: 34 MMOL/L — HIGH (ref 21–29)
HCT VFR BLD CALC: 42.7 % — SIGNIFICANT CHANGE UP (ref 34.5–45)
HCT VFR BLD CALC: 44.9 % — SIGNIFICANT CHANGE UP (ref 34.5–45)
HCT VFR BLDA CALC: 46 % — SIGNIFICANT CHANGE UP (ref 39–50)
HCT VFR BLDA CALC: 48 % — SIGNIFICANT CHANGE UP (ref 39–50)
HGB BLD CALC-MCNC: 15.1 G/DL — SIGNIFICANT CHANGE UP (ref 11.5–15.5)
HGB BLD CALC-MCNC: 15.7 G/DL — HIGH (ref 11.5–15.5)
HGB BLD-MCNC: 13.3 G/DL — SIGNIFICANT CHANGE UP (ref 11.5–15.5)
HGB BLD-MCNC: 14.6 G/DL — SIGNIFICANT CHANGE UP (ref 11.5–15.5)
HOROWITZ INDEX BLDV+IHG-RTO: 100 — SIGNIFICANT CHANGE UP
HOROWITZ INDEX BLDV+IHG-RTO: 100 — SIGNIFICANT CHANGE UP
HYALINE CASTS # UR AUTO: 20 /LPF — HIGH (ref 0–2)
IMM GRANULOCYTES NFR BLD AUTO: 1.4 % — SIGNIFICANT CHANGE UP (ref 0–1.5)
KETONES UR-MCNC: NEGATIVE — SIGNIFICANT CHANGE UP
LACTATE BLDV-MCNC: 3.1 MMOL/L — HIGH (ref 0.7–2)
LACTATE BLDV-MCNC: 3.4 MMOL/L — HIGH (ref 0.7–2)
LEUKOCYTE ESTERASE UR-ACNC: NEGATIVE — SIGNIFICANT CHANGE UP
LYMPHOCYTES # BLD AUTO: 0.95 K/UL — LOW (ref 1–3.3)
LYMPHOCYTES # BLD AUTO: 7.2 % — LOW (ref 13–44)
MAGNESIUM SERPL-MCNC: 2.2 MG/DL — SIGNIFICANT CHANGE UP (ref 1.6–2.6)
MCHC RBC-ENTMCNC: 28.3 PG — SIGNIFICANT CHANGE UP (ref 27–34)
MCHC RBC-ENTMCNC: 28.4 PG — SIGNIFICANT CHANGE UP (ref 27–34)
MCHC RBC-ENTMCNC: 31.1 GM/DL — LOW (ref 32–36)
MCHC RBC-ENTMCNC: 32.5 GM/DL — SIGNIFICANT CHANGE UP (ref 32–36)
MCV RBC AUTO: 87.4 FL — SIGNIFICANT CHANGE UP (ref 80–100)
MCV RBC AUTO: 90.9 FL — SIGNIFICANT CHANGE UP (ref 80–100)
MONOCYTES # BLD AUTO: 0.52 K/UL — SIGNIFICANT CHANGE UP (ref 0–0.9)
MONOCYTES NFR BLD AUTO: 3.9 % — SIGNIFICANT CHANGE UP (ref 2–14)
NEUTROPHILS # BLD AUTO: 11.45 K/UL — HIGH (ref 1.8–7.4)
NEUTROPHILS NFR BLD AUTO: 86.8 % — HIGH (ref 43–77)
NITRITE UR-MCNC: NEGATIVE — SIGNIFICANT CHANGE UP
NRBC # BLD: 0 /100 WBCS — SIGNIFICANT CHANGE UP (ref 0–0)
NRBC # BLD: 0 /100 WBCS — SIGNIFICANT CHANGE UP (ref 0–0)
OTHER CELLS CSF MANUAL: 10 ML/DL — LOW (ref 18–22)
OTHER CELLS CSF MANUAL: 16 ML/DL — LOW (ref 18–22)
PCO2 BLDA: 66 MMHG — HIGH (ref 32–46)
PCO2 BLDV: 68 MMHG — HIGH (ref 35–50)
PCO2 BLDV: 76 MMHG — HIGH (ref 35–50)
PH BLDA: 7.35 — SIGNIFICANT CHANGE UP (ref 7.35–7.45)
PH BLDV: 7.22 — LOW (ref 7.35–7.45)
PH BLDV: 7.32 — LOW (ref 7.35–7.45)
PH UR: 6 — SIGNIFICANT CHANGE UP (ref 5–8)
PHOSPHATE SERPL-MCNC: 2.9 MG/DL — SIGNIFICANT CHANGE UP (ref 2.5–4.5)
PLATELET # BLD AUTO: 227 K/UL — SIGNIFICANT CHANGE UP (ref 150–400)
PLATELET # BLD AUTO: 253 K/UL — SIGNIFICANT CHANGE UP (ref 150–400)
PO2 BLDA: 31 MMHG — CRITICAL LOW (ref 74–108)
PO2 BLDV: 32 MMHG — SIGNIFICANT CHANGE UP (ref 25–45)
PO2 BLDV: 54 MMHG — HIGH (ref 25–45)
POTASSIUM BLDV-SCNC: 3.4 MMOL/L — LOW (ref 3.5–5.3)
POTASSIUM BLDV-SCNC: 4.7 MMOL/L — SIGNIFICANT CHANGE UP (ref 3.5–5.3)
POTASSIUM SERPL-MCNC: 5.1 MMOL/L — SIGNIFICANT CHANGE UP (ref 3.5–5.3)
POTASSIUM SERPL-SCNC: 5.1 MMOL/L — SIGNIFICANT CHANGE UP (ref 3.5–5.3)
PROCALCITONIN SERPL-MCNC: 0.07 NG/ML — SIGNIFICANT CHANGE UP (ref 0.02–0.1)
PROT SERPL-MCNC: 7.1 G/DL — SIGNIFICANT CHANGE UP (ref 6–8.3)
PROT UR-MCNC: ABNORMAL
RBC # BLD: 4.7 M/UL — SIGNIFICANT CHANGE UP (ref 3.8–5.2)
RBC # BLD: 5.14 M/UL — SIGNIFICANT CHANGE UP (ref 3.8–5.2)
RBC # FLD: 16 % — HIGH (ref 10.3–14.5)
RBC # FLD: 16.1 % — HIGH (ref 10.3–14.5)
RBC CASTS # UR COMP ASSIST: 3 /HPF — SIGNIFICANT CHANGE UP (ref 0–4)
SAO2 % BLDA: 52 % — LOW (ref 92–96)
SAO2 % BLDV: 47 % — LOW (ref 67–88)
SAO2 % BLDV: 78 % — SIGNIFICANT CHANGE UP (ref 67–88)
SODIUM SERPL-SCNC: 143 MMOL/L — SIGNIFICANT CHANGE UP (ref 135–145)
SP GR SPEC: 1.01 — SIGNIFICANT CHANGE UP (ref 1.01–1.02)
UROBILINOGEN FLD QL: NEGATIVE — SIGNIFICANT CHANGE UP
WBC # BLD: 13.21 K/UL — HIGH (ref 3.8–10.5)
WBC # BLD: 19.93 K/UL — HIGH (ref 3.8–10.5)
WBC # FLD AUTO: 13.21 K/UL — HIGH (ref 3.8–10.5)
WBC # FLD AUTO: 19.93 K/UL — HIGH (ref 3.8–10.5)
WBC UR QL: 8 /HPF — HIGH (ref 0–5)

## 2020-11-23 PROCEDURE — 36620 INSERTION CATHETER ARTERY: CPT | Mod: GC

## 2020-11-23 PROCEDURE — 99291 CRITICAL CARE FIRST HOUR: CPT | Mod: 25

## 2020-11-23 PROCEDURE — 36556 INSERT NON-TUNNEL CV CATH: CPT | Mod: GC

## 2020-11-23 PROCEDURE — 99233 SBSQ HOSP IP/OBS HIGH 50: CPT | Mod: 25

## 2020-11-23 PROCEDURE — 71045 X-RAY EXAM CHEST 1 VIEW: CPT | Mod: 26,76

## 2020-11-23 RX ORDER — KETAMINE HYDROCHLORIDE 100 MG/ML
0.1 INJECTION INTRAMUSCULAR; INTRAVENOUS
Qty: 1000 | Refills: 0 | Status: DISCONTINUED | OUTPATIENT
Start: 2020-11-23 | End: 2020-11-23

## 2020-11-23 RX ORDER — HEPARIN SODIUM 5000 [USP'U]/ML
5000 INJECTION INTRAVENOUS; SUBCUTANEOUS ONCE
Refills: 0 | Status: COMPLETED | OUTPATIENT
Start: 2020-11-23 | End: 2020-11-23

## 2020-11-23 RX ORDER — CHLORHEXIDINE GLUCONATE 213 G/1000ML
15 SOLUTION TOPICAL EVERY 12 HOURS
Refills: 0 | Status: DISCONTINUED | OUTPATIENT
Start: 2020-11-23 | End: 2020-11-29

## 2020-11-23 RX ORDER — ENOXAPARIN SODIUM 100 MG/ML
40 INJECTION SUBCUTANEOUS EVERY 12 HOURS
Refills: 0 | Status: DISCONTINUED | OUTPATIENT
Start: 2020-11-23 | End: 2020-11-23

## 2020-11-23 RX ORDER — ALBUTEROL 90 UG/1
2.5 AEROSOL, METERED ORAL EVERY 6 HOURS
Refills: 0 | Status: DISCONTINUED | OUTPATIENT
Start: 2020-11-23 | End: 2020-11-23

## 2020-11-23 RX ORDER — NOREPINEPHRINE BITARTRATE/D5W 8 MG/250ML
0.05 PLASTIC BAG, INJECTION (ML) INTRAVENOUS
Qty: 8 | Refills: 0 | Status: DISCONTINUED | OUTPATIENT
Start: 2020-11-23 | End: 2020-12-07

## 2020-11-23 RX ORDER — PANTOPRAZOLE SODIUM 20 MG/1
40 TABLET, DELAYED RELEASE ORAL DAILY
Refills: 0 | Status: ACTIVE | OUTPATIENT
Start: 2020-11-23 | End: 2021-10-22

## 2020-11-23 RX ORDER — INSULIN GLARGINE 100 [IU]/ML
10 INJECTION, SOLUTION SUBCUTANEOUS AT BEDTIME
Refills: 0 | Status: DISCONTINUED | OUTPATIENT
Start: 2020-11-23 | End: 2020-11-24

## 2020-11-23 RX ORDER — CISATRACURIUM BESYLATE 2 MG/ML
3 INJECTION INTRAVENOUS
Qty: 200 | Refills: 0 | Status: DISCONTINUED | OUTPATIENT
Start: 2020-11-23 | End: 2020-11-25

## 2020-11-23 RX ORDER — FENTANYL CITRATE 50 UG/ML
0.5 INJECTION INTRAVENOUS
Qty: 5000 | Refills: 0 | Status: DISCONTINUED | OUTPATIENT
Start: 2020-11-23 | End: 2020-11-23

## 2020-11-23 RX ORDER — MIDAZOLAM HYDROCHLORIDE 1 MG/ML
0.02 INJECTION, SOLUTION INTRAMUSCULAR; INTRAVENOUS
Qty: 100 | Refills: 0 | Status: DISCONTINUED | OUTPATIENT
Start: 2020-11-23 | End: 2020-11-30

## 2020-11-23 RX ORDER — FENTANYL CITRATE 50 UG/ML
0.5 INJECTION INTRAVENOUS
Qty: 5000 | Refills: 0 | Status: DISCONTINUED | OUTPATIENT
Start: 2020-11-23 | End: 2020-11-27

## 2020-11-23 RX ORDER — FENTANYL CITRATE 50 UG/ML
0.5 INJECTION INTRAVENOUS
Qty: 2500 | Refills: 0 | Status: DISCONTINUED | OUTPATIENT
Start: 2020-11-23 | End: 2020-11-23

## 2020-11-23 RX ORDER — INSULIN LISPRO 100/ML
VIAL (ML) SUBCUTANEOUS EVERY 4 HOURS
Refills: 0 | Status: DISCONTINUED | OUTPATIENT
Start: 2020-11-23 | End: 2020-11-23

## 2020-11-23 RX ORDER — PROPOFOL 10 MG/ML
50 INJECTION, EMULSION INTRAVENOUS
Qty: 1000 | Refills: 0 | Status: DISCONTINUED | OUTPATIENT
Start: 2020-11-23 | End: 2020-11-30

## 2020-11-23 RX ORDER — CALCIUM GLUCONATE 100 MG/ML
1 VIAL (ML) INTRAVENOUS ONCE
Refills: 0 | Status: COMPLETED | OUTPATIENT
Start: 2020-11-23 | End: 2020-11-23

## 2020-11-23 RX ORDER — INSULIN HUMAN 100 [IU]/ML
3 INJECTION, SOLUTION SUBCUTANEOUS
Qty: 100 | Refills: 0 | Status: DISCONTINUED | OUTPATIENT
Start: 2020-11-23 | End: 2020-11-25

## 2020-11-23 RX ORDER — CHLORHEXIDINE GLUCONATE 213 G/1000ML
1 SOLUTION TOPICAL
Refills: 0 | Status: ACTIVE | OUTPATIENT
Start: 2020-11-23 | End: 2021-10-22

## 2020-11-23 RX ORDER — HEPARIN SODIUM 5000 [USP'U]/ML
1000 INJECTION INTRAVENOUS; SUBCUTANEOUS
Qty: 25000 | Refills: 0 | Status: DISCONTINUED | OUTPATIENT
Start: 2020-11-23 | End: 2020-11-25

## 2020-11-23 RX ORDER — POTASSIUM CHLORIDE 20 MEQ
20 PACKET (EA) ORAL EVERY 4 HOURS
Refills: 0 | Status: COMPLETED | OUTPATIENT
Start: 2020-11-23 | End: 2020-11-23

## 2020-11-23 RX ORDER — SODIUM CHLORIDE 9 MG/ML
10 INJECTION INTRAMUSCULAR; INTRAVENOUS; SUBCUTANEOUS
Refills: 0 | Status: ACTIVE | OUTPATIENT
Start: 2020-11-23 | End: 2021-10-22

## 2020-11-23 RX ORDER — INSULIN GLARGINE 100 [IU]/ML
10 INJECTION, SOLUTION SUBCUTANEOUS ONCE
Refills: 0 | Status: COMPLETED | OUTPATIENT
Start: 2020-11-23 | End: 2020-11-23

## 2020-11-23 RX ORDER — CISATRACURIUM BESYLATE 2 MG/ML
20 INJECTION INTRAVENOUS ONCE
Refills: 0 | Status: COMPLETED | OUTPATIENT
Start: 2020-11-23 | End: 2020-11-23

## 2020-11-23 RX ORDER — POTASSIUM CHLORIDE 20 MEQ
40 PACKET (EA) ORAL EVERY 4 HOURS
Refills: 0 | Status: DISCONTINUED | OUTPATIENT
Start: 2020-11-23 | End: 2020-11-23

## 2020-11-23 RX ADMIN — Medication 12.8 MICROGRAM(S)/KG/MIN: at 08:26

## 2020-11-23 RX ADMIN — Medication 10: at 21:21

## 2020-11-23 RX ADMIN — Medication 8: at 13:59

## 2020-11-23 RX ADMIN — PANTOPRAZOLE SODIUM 40 MILLIGRAM(S): 20 TABLET, DELAYED RELEASE ORAL at 11:11

## 2020-11-23 RX ADMIN — INSULIN GLARGINE 10 UNIT(S): 100 INJECTION, SOLUTION SUBCUTANEOUS at 21:20

## 2020-11-23 RX ADMIN — Medication 100 GRAM(S): at 09:05

## 2020-11-23 RX ADMIN — CISATRACURIUM BESYLATE 20 MILLIGRAM(S): 2 INJECTION INTRAVENOUS at 06:28

## 2020-11-23 RX ADMIN — Medication 10: at 17:05

## 2020-11-23 RX ADMIN — HEPARIN SODIUM 10 UNIT(S)/HR: 5000 INJECTION INTRAVENOUS; SUBCUTANEOUS at 08:25

## 2020-11-23 RX ADMIN — PROPOFOL 40.8 MICROGRAM(S)/KG/MIN: 10 INJECTION, EMULSION INTRAVENOUS at 08:26

## 2020-11-23 RX ADMIN — CHLORHEXIDINE GLUCONATE 15 MILLILITER(S): 213 SOLUTION TOPICAL at 06:36

## 2020-11-23 RX ADMIN — Medication 100 MILLIEQUIVALENT(S): at 10:41

## 2020-11-23 RX ADMIN — Medication 6: at 09:06

## 2020-11-23 RX ADMIN — CHLORHEXIDINE GLUCONATE 1 APPLICATION(S): 213 SOLUTION TOPICAL at 06:36

## 2020-11-23 RX ADMIN — FENTANYL CITRATE 3.4 MICROGRAM(S)/KG/HR: 50 INJECTION INTRAVENOUS at 09:05

## 2020-11-23 RX ADMIN — INSULIN GLARGINE 10 UNIT(S): 100 INJECTION, SOLUTION SUBCUTANEOUS at 17:29

## 2020-11-23 RX ADMIN — CISATRACURIUM BESYLATE 24.5 MICROGRAM(S)/KG/MIN: 2 INJECTION INTRAVENOUS at 06:28

## 2020-11-23 RX ADMIN — Medication 6 MILLIGRAM(S): at 06:41

## 2020-11-23 RX ADMIN — Medication 6: at 06:37

## 2020-11-23 RX ADMIN — Medication 100 MILLIEQUIVALENT(S): at 10:21

## 2020-11-23 RX ADMIN — CISATRACURIUM BESYLATE 24.5 MICROGRAM(S)/KG/MIN: 2 INJECTION INTRAVENOUS at 08:25

## 2020-11-23 RX ADMIN — CHLORHEXIDINE GLUCONATE 15 MILLILITER(S): 213 SOLUTION TOPICAL at 17:05

## 2020-11-23 NOTE — PROGRESS NOTE ADULT - SUBJECTIVE AND OBJECTIVE BOX
HPI:  60F w/pmhx AARON, HTN, 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 Patient reports that she also had a general feeling of "uneasiness" that she could not explain for the last week. No chest pain. No pleuritic pain. No leg swelling or pain. Denies myalgias. Denies sick contacts. Reports that her  and son live with her and they have tested so far negative for covid and are asymptomatic, patient is not sure where was exposed. Reports trying her symbicort this week for shortness of breath without much relief. Patient yesterday began to have fevers with highest fever at 104F which alarmed her and for that reason she came to the hospital for help.  (18 Nov 2020 05:46)      24 hr events: Patient on med/surg COVID unit had RRT for worsening SOB while proned on BIPAP. Patient intubated and brought to the ICU. S/P TLC and Arterial line placement. ABG post intubation profoundly hypoxic despite 100% FIO2. Patient proned @ 12:00PM.       ## Labs:  CBC:                        13.3   19.93 )-----------( 227      ( 23 Nov 2020 14:06 )             42.7     Chem:  11-23    143  |  105  |  23  ----------------------------<  242<H>  5.1   |  25  |  0.53    Ca    9.0      23 Nov 2020 03:07  Phos  2.9     11-23  Mg     2.2     11-23    TPro  7.1  /  Alb  2.9<L>  /  TBili  0.7  /  DBili  x   /  AST  52<H>  /  ALT  27  /  AlkPhos  66  11-23    Coags:  PTT - ( 23 Nov 2020 10:35 )  PTT:36.1 sec        ## Imaging:    ## Medications:    norepinephrine Infusion 0.05 MICROgram(s)/kG/Min IV Continuous <Continuous>      dexAMETHasone  Injectable 6 milliGRAM(s) IV Push daily  insulin glargine Injectable (LANTUS) 10 Unit(s) SubCutaneous at bedtime  insulin lispro (ADMELOG) corrective regimen sliding scale   SubCutaneous every 4 hours    heparin  Infusion 1000 Unit(s)/Hr IV Continuous <Continuous>    pantoprazole  Injectable 40 milliGRAM(s) IV Push daily    cisatracurium Infusion 3 MICROgram(s)/kG/Min IV Continuous <Continuous>  fentaNYL   Infusion... 0.5 MICROgram(s)/kG/Hr IV Continuous <Continuous>  propofol Infusion 50 MICROgram(s)/kG/Min IV Continuous <Continuous>      ## Vitals:  T(C): 36.1 (11-23-20 @ 11:00), Max: 37.1 (11-23-20 @ 04:54)  HR: 63 (11-23-20 @ 14:00) (57 - 122)  BP: 117/54 (11-23-20 @ 05:45) (111/51 - 160/70)  BP(mean): 76 (11-23-20 @ 05:45) (73 - 100)  RR: 28 (11-23-20 @ 14:00) (22 - 28)  SpO2: 99% (11-23-20 @ 14:00) (68% - 100%)  Wt(kg): --  Vent: Mode: AC/ CMV (Assist Control/ Continuous Mandatory Ventilation), RR (machine): 28, RR (patient): 28, TV (machine): 350, FiO2: 100, PEEP: 18, PIP: 40  ABG: ABG - ( 23 Nov 2020 12:54 )  pH, Arterial: 7.24  pH, Blood: x     /  pCO2: 62    /  pO2: 104   / HCO3: 26    / Base Excess: -2.7  /  SaO2: 97                    11-22 @ 07:01  -  11-23 @ 07:00  --------------------------------------------------------  IN: 327.8 mL / OUT: 630 mL / NET: -302.2 mL    11-23 @ 07:01  -  11-23 @ 14:36  --------------------------------------------------------  IN: 762.6 mL / OUT: 130 mL / NET: 632.6 mL          ## P/E:  Gen: lying comfortably in bed in no apparent distress  Mouth: Moist membranes, ETT and OGT in place   Neck: Obese, supple, no JVD  Lungs: CTA B/L   Heart: RRR S1S2  Abd: Soft , non-tender, non-distended  Ext: Warm and perfused, 2+ pulses   Neuro: Paralyzed and sedated       CODE STATUS: [X ] full code  [ ] DNR  [ ] DNI  [ ] [facility]  Goals of care discussion: [ ] yes

## 2020-11-23 NOTE — PROGRESS NOTE ADULT - ATTENDING COMMENTS
60F w/ pmhx morbid obesity, AARON, HTN, DVT&PE in the past, p/w  acute hypoxic respiratory failure d/t COVID+ pneumonia,  and now ARDS. Pt intubated and now w/ severe hypoxia. Stable sat currently after paralysis and proning. Cont LTV ventilation. Cont apralysis and monitor for synchrony and TOF. Proned this afternoon. Supine tomorrow if stable. COnt pressors to maintain map>65. COnt sedation for analgosedation and synchrony. Titrate fio2/peep to sat>90% if possible. Cont a/c for VTE hx and covid, monitor PTT and for bleeding. Prognosis guarded

## 2020-11-23 NOTE — RAPID RESPONSE TEAM SUMMARY - NSMEDICATIONSRRT_GEN_ALL_CORE
- Lasix 40mg IVP x 1  - RSI medications (see RRT sheet)  POST INTUBATION:  - Ativan 4mg IVP x 1  - Rocurronium 50mg IV x 1  - Propofol IV

## 2020-11-23 NOTE — RAPID RESPONSE TEAM SUMMARY - NSSITUATIONBACKGROUNDRRT_GEN_ALL_CORE
60F w/ pmhx morbid obesity, AARON, HTN, DVT&PE in the past, ?Asthma now p/w a 9 day hx of cough/SOB, f adm with sepsis 2/2 acute hypoxic respiratory failure d/t COVID+ pneumonia, worsening, now on nonrebreather. RRT called for hypoxia in the 70s while on the NRB. On arrival, patient was on CPAP setting and was changed to bipap 12/5. Patient endorsed not feeling as sob however was pulling good volumes on the bipap but remained tachypneic.   
59 yo F with past medical history significant for HTN, DVT on xarelto, AARON (on CPAP) who presented on 11/17/20 with 1 day of fever (tmax of 104) and 10 days of dry cough and NB diarrhea (2-3 episodes/day). Previously on home O2 2-3 yrs ago after she had PE - needed 3L at that time - stopped using it since she started CPAP at night. Pt admitted for acute hypoxic respiratory failure 2/2 COVID pneumonia; completed 5-day course of remdesivir, currently on decadron and ISS.    --RRT called this morning for recurring hypoxia (spo2 mid-80s), remained with RR 30s-40s (which patient has been during hospital course).

## 2020-11-23 NOTE — CHART NOTE - NSCHARTNOTEFT_GEN_A_CORE
Patient proned 11/23/20 @ 12:00 PM. Tolerated prone well with no issues. To remain proned x 18 hours. Discussed with attending and bedside RN

## 2020-11-23 NOTE — RAPID RESPONSE TEAM SUMMARY - NSOTHERSPECIFYRRT3_GEN_ALL_CORE
ABG w/ lactate, CBC, CMP, Mg, Phos, Procalcitonin ABG w/ lactate, CBC, CMP, Mg, Phos, Procalcitonin  IO placement

## 2020-11-23 NOTE — PROCEDURE NOTE - SUPERVISORY STATEMENT
No immediate complications.  Patient critically ill
No immediate complications.  Patient critically ill.

## 2020-11-23 NOTE — CHART NOTE - NSCHARTNOTEFT_GEN_A_CORE
Called by RN, for recurrent hypoxia, sats high 80s with RR sustaining in 40s on AVAPS with FIO2 100%. Pt alert, denies any lightheadedness, SOB, CP, palpitations. Pt s/p RRT yesterday requiring NRB to AVAPS setting, now with hypoxia and tachypnea. Pt is a 60F w/ pmhx morbid obesity, AARON, HTN, DVT&PE in the past, ?Asthma now p/w a 9 day hx of cough/SOB, admitted with sepsis 2/2 acute hypoxic respiratory failure d/t COVID+ pneumonia, worsening, now on AVAPS. RRT called for hypoxia. Pt given Albuterol MDI with sats up to 89% with no improvement in RR. RRT further gave Lasix 40 mg IVP and attempted prone position. Pt unchanged with noted hypoxia on ABG with pO2 46.. Anesthesia called for intubation.      Vital Signs Last 24 Hrs  T(C): 37 (22 Nov 2020 20:03), Max: 37.1 (22 Nov 2020 11:55)  T(F): 98.6 (22 Nov 2020 20:03), Max: 98.7 (22 Nov 2020 11:55)  HR: 65 (22 Nov 2020 23:05) (65 - 96)  BP: 125/64 (22 Nov 2020 20:03) (125/64 - 142/79)  BP(mean): --  RR: 22 (22 Nov 2020 20:03) (22 - 51)  SpO2: 87% (22 Nov 2020 23:05) (85% - 97%)    ABG - ( 23 Nov 2020 03:32 )  pH, Arterial: 7.45  pH, Blood: x     /  pCO2: 46    /  pO2: 46    / HCO3: 32    / Base Excess: 7.1   /  SaO2: 82          A/P: 60F w/ pmhx morbid obesity, AARON, HTN, DVT&PE in the past, ?Asthma now p/w a 9 day hx of cough/SOB, admitted with sepsis 2/2 acute hypoxic respiratory failure d/t COVID+ pneumonia, worsening, now on AVAPS, now requiring intubation.     - RRT team at bedside, anesthesia called for intubation  - RRT team discussed with , pt and spouse agreed to intubation   - Discussed present situation with Night Hospitalist Dr Samy Salinas   - [facility]    Kal Julien, NP  x [phone]

## 2020-11-23 NOTE — RAPID RESPONSE TEAM SUMMARY - NSOTHERINTERVENTIONSRRT_GEN_ALL_CORE
Bipap increased to 15/7. Confirmed with patient during RRT that is would want to be intubated if medically indicated.   Obtain ABG in 1 hour  Goal SpO2>80%  Discussed with primary RN and NP
Transfer to 5ICU for further management

## 2020-11-23 NOTE — PROGRESS NOTE ADULT - SUBJECTIVE AND OBJECTIVE BOX
Called to patient's bedside for intubation.  Therapy initiated by primary medical team.  On arrival, patient on 100% fio2 bipap and tachypneic.      Medications Administered: Etomidate 20mg, 100mg Succinylcholine via IO placed by Rapid Response Team    Intubation:      [ ] Direct Laryngoscopy    [X] Video-Assisted Laryngoscopy   [ ] Fiberoptic Intubation    Blade: Glide 3  Cormack-Lehane View: [X] 1     [ ] 2A     [ ] 2B     [ ] 3     [ ]  4  ETT Size: 7.5  Marking at Teeth: 22    Positive end-tidal carbon dioxide via EasyCap, positive bilateral breath sounds.  CXR to be reviewed by primary team.  Atraumatic intubation with no complications.

## 2020-11-23 NOTE — CHART NOTE - NSCHARTNOTEFT_GEN_A_CORE
Pt is a 60yr old owman with PMhx including HTN, AARON, possible asthma, DVT/DVT who presented to the ER on  with chief complaint of SOB and was admitted for viral pneumonia from covid19.    24hr events: Pt intubated for acute hypoxic respiratory failure in the setting of covid19 --> ICU transfer.     ICU Vital Signs Last 24 Hrs  T(C): 37 (2020 20:03), Max: 37.1 (2020 11:55)  T(F): 98.6 (2020 20:03), Max: 98.7 (2020 11:55)  HR: 65 (2020 23:05) (65 - 96)  BP: 125/64 (2020 20:03) (125/64 - 142/79)  BP(mean): --  ABP: --  ABP(mean): --  RR: 22 (2020 20:03) (22 - 51)  SpO2: 87% (2020 23:05) (85% - 97%)    CBC Full  -  ( 2020 03:07 )  WBC Count : 13.21 K/uL  RBC Count : 5.14 M/uL  Hemoglobin : 14.6 g/dL  Hematocrit : 44.9 %  Platelet Count - Automated : 253 K/uL  Mean Cell Volume : 87.4 fl  Mean Cell Hemoglobin : 28.4 pg  Mean Cell Hemoglobin Concentration : 32.5 gm/dL  Auto Neutrophil # : 11.45 K/uL  Auto Lymphocyte # : 0.95 K/uL  Auto Monocyte # : 0.52 K/uL  Auto Eosinophil # : 0.07 K/uL  Auto Basophil # : 0.03 K/uL  Auto Neutrophil % : 86.8 %  Auto Lymphocyte % : 7.2 %  Auto Monocyte % : 3.9 %  Auto Eosinophil % : 0.5 %  Auto Basophil % : 0.2 %        143  |  105  |  23  ----------------------------<  242<H>  5.1   |  25  |  0.53    Ca    9.0      2020 03:07  Phos  2.9     11  Mg     2.2         TPro  7.1  /  Alb  2.9<L>  /  TBili  0.7  /  DBili  x   /  AST  52<H>  /  ALT  27  /  AlkPhos  66  11-23    ABG - ( 2020 03:32 )  pH, Arterial: 7.45  pH, Blood: x     /  pCO2: 46    /  pO2: 46    / HCO3: 32    / Base Excess: 7.1   /  SaO2: 82        CULTURES:    I&O's Summary    2020 07:01  -  2020 07:00  --------------------------------------------------------  IN: 680 mL / OUT: 1800 mL / NET: -1120 mL    2020 07:  -  2020 04:02  --------------------------------------------------------  IN: 100 mL / OUT: 500 mL / NET: -400 mL      MEDICATIONS  (STANDING):  budesonide  80 MICROgram(s)/formoterol 4.5 MICROgram(s) Inhaler 2 Puff(s) Inhalation two times a day  dexAMETHasone  Injectable 6 milliGRAM(s) IV Push daily  dextrose 40% Gel 15 Gram(s) Oral once  dextrose 5%. 1000 milliLiter(s) (50 mL/Hr) IV Continuous <Continuous>  dextrose 5%. 1000 milliLiter(s) (100 mL/Hr) IV Continuous <Continuous>  dextrose 50% Injectable 25 Gram(s) IV Push once  dextrose 50% Injectable 12.5 Gram(s) IV Push once  dextrose 50% Injectable 25 Gram(s) IV Push once  glucagon  Injectable 1 milliGRAM(s) IntraMuscular once  insulin lispro (ADMELOG) corrective regimen sliding scale   SubCutaneous three times a day before meals  insulin lispro (ADMELOG) corrective regimen sliding scale   SubCutaneous at bedtime  lisinopril 20 milliGRAM(s) Oral daily  metoprolol tartrate 50 milliGRAM(s) Oral two times a day  rivaroxaban 20 milliGRAM(s) Oral with dinner    MEDICATIONS  (PRN):  ALBUTerol    90 MICROgram(s) HFA Inhaler 2 Puff(s) Inhalation every 6 hours PRN Shortness of Breath and/or Wheezing    Kiwi (Unknown)  latex (Anaphylaxis)  latex (Unknown)  penicillin (Other)  penicillin (Unknown)  perfume  hives (Other)  potassium acetate (Other)  soap additives hives (Other)    FAMILY HISTORY:  Family history of gastric cancer    Family history of breast cancer (Grandparent)    PAST MEDICAL & SURGICAL HISTORY:  Pneumomediastinum    Umbilical hernia  Reduciable    Osteoarthritis of both knees, unspecified osteoarthritis type    AARON (Obstructive Sleep Apnea)  category II pt uses c/pap pt to bering to hospital/  OR booking notified    Pneumonia      GERD (Gastroesophageal Reflux Disease)    Asthma  diagnosed   on adbvair denies attacks    DVT (Deep Venous Thrombosis)  left leg 6 m s/p knee replacement in     HTN (Hypertension)    H/O ileostomy  Ileostomy Revesal     S/P LEEP  2000    S/P Exploratory Laparotomy    peritonitis  s/p right knee replacement/ colon resection and ileostomy    S/P Colonoscopy      S/P Endoscopy   gerd    S/P  Section      History of Tubal Ligation  s/p c/section 2000    S/P Knee Surgery  2010      RADIOLOGY/IMAGING/ECHO  < from: CT Angio Chest w/ IV Cont (20 @ 00:49) >    IMPRESSION:    Suboptimal evaluation of the peripheral pulmonary arteries. No obvious central pulmonary embolus or secondary signs of right heart strain.    Commonly reported imaging features of COVID-19 pneumonia.    < end of copied text >    < from: 12 Lead ECG (. @ 21:56) >    Diagnosis Line NORMAL SINUS RHYTHM  POSSIBLE LEFT ATRIAL ENLARGEMENT  NONSPECIFIC T WAVE ABNORMALITY  ABNORMAL ECG  WHEN COMPARED WITH ECG OF 18-OCT-2017 16:44,  NO SIGNIFICANT CHANGE WAS FOUND    < end of copied text >    Social History: Unable to assess due to clinical condition   ROS: Unable to assess due to clinical condition     Physical Examination:  General:   Neuro:  HEENT:   PULM:   CVS:   ABD:   EXT:   SKIN:     Pt is a 60yr old woman with PMHx as above now being managed for viral pneumonia secondary to covid19 infection.    Neuro:  --Sedate for vent synchrony with Ketamine/Fentanyl     Pulm:  --Initiate vent at   --f/u ABG, adjust vent prn    Cardiac:  ID:  GI:  :  Endo:  Prophylaxis: Pt is a 60yr old owman with PMhx including HTN, AARON, possible asthma, DVT/DVT who presented to the ER on  with chief complaint of SOB and was admitted for viral pneumonia from covid19.    24hr events: Pt intubated for acute hypoxic respiratory failure in the setting of covid19 --> ICU transfer.     ICU Vital Signs Last 24 Hrs  T(C): 37 (2020 20:03), Max: 37.1 (2020 11:55)  T(F): 98.6 (2020 20:03), Max: 98.7 (2020 11:55)  HR: 65 (2020 23:05) (65 - 96)  BP: 125/64 (2020 20:03) (125/64 - 142/79)  BP(mean): --  ABP: --  ABP(mean): --  RR: 22 (2020 20:03) (22 - 51)  SpO2: 87% (2020 23:05) (85% - 97%)    CBC Full  -  ( 2020 03:07 )  WBC Count : 13.21 K/uL  RBC Count : 5.14 M/uL  Hemoglobin : 14.6 g/dL  Hematocrit : 44.9 %  Platelet Count - Automated : 253 K/uL  Mean Cell Volume : 87.4 fl  Mean Cell Hemoglobin : 28.4 pg  Mean Cell Hemoglobin Concentration : 32.5 gm/dL  Auto Neutrophil # : 11.45 K/uL  Auto Lymphocyte # : 0.95 K/uL  Auto Monocyte # : 0.52 K/uL  Auto Eosinophil # : 0.07 K/uL  Auto Basophil # : 0.03 K/uL  Auto Neutrophil % : 86.8 %  Auto Lymphocyte % : 7.2 %  Auto Monocyte % : 3.9 %  Auto Eosinophil % : 0.5 %  Auto Basophil % : 0.2 %        143  |  105  |  23  ----------------------------<  242<H>  5.1   |  25  |  0.53    Ca    9.0      2020 03:07  Phos  2.9     11  Mg     2.2         TPro  7.1  /  Alb  2.9<L>  /  TBili  0.7  /  DBili  x   /  AST  52<H>  /  ALT  27  /  AlkPhos  66  11-23    ABG - ( 2020 03:32 )  pH, Arterial: 7.45  pH, Blood: x     /  pCO2: 46    /  pO2: 46    / HCO3: 32    / Base Excess: 7.1   /  SaO2: 82        CULTURES:    I&O's Summary    2020 07:01  -  2020 07:00  --------------------------------------------------------  IN: 680 mL / OUT: 1800 mL / NET: -1120 mL    2020 07:  -  2020 04:02  --------------------------------------------------------  IN: 100 mL / OUT: 500 mL / NET: -400 mL      MEDICATIONS  (STANDING):  budesonide  80 MICROgram(s)/formoterol 4.5 MICROgram(s) Inhaler 2 Puff(s) Inhalation two times a day  dexAMETHasone  Injectable 6 milliGRAM(s) IV Push daily  dextrose 40% Gel 15 Gram(s) Oral once  dextrose 5%. 1000 milliLiter(s) (50 mL/Hr) IV Continuous <Continuous>  dextrose 5%. 1000 milliLiter(s) (100 mL/Hr) IV Continuous <Continuous>  dextrose 50% Injectable 25 Gram(s) IV Push once  dextrose 50% Injectable 12.5 Gram(s) IV Push once  dextrose 50% Injectable 25 Gram(s) IV Push once  glucagon  Injectable 1 milliGRAM(s) IntraMuscular once  insulin lispro (ADMELOG) corrective regimen sliding scale   SubCutaneous three times a day before meals  insulin lispro (ADMELOG) corrective regimen sliding scale   SubCutaneous at bedtime  lisinopril 20 milliGRAM(s) Oral daily  metoprolol tartrate 50 milliGRAM(s) Oral two times a day  rivaroxaban 20 milliGRAM(s) Oral with dinner    MEDICATIONS  (PRN):  ALBUTerol    90 MICROgram(s) HFA Inhaler 2 Puff(s) Inhalation every 6 hours PRN Shortness of Breath and/or Wheezing    Kiwi (Unknown)  latex (Anaphylaxis)  latex (Unknown)  penicillin (Other)  penicillin (Unknown)  perfume  hives (Other)  potassium acetate (Other)  soap additives hives (Other)    FAMILY HISTORY:  Family history of gastric cancer    Family history of breast cancer (Grandparent)    PAST MEDICAL & SURGICAL HISTORY:  Pneumomediastinum    Umbilical hernia  Reduciable    Osteoarthritis of both knees, unspecified osteoarthritis type    AARON (Obstructive Sleep Apnea)  category II pt uses c/pap pt to bering to hospital/  OR booking notified    Pneumonia      GERD (Gastroesophageal Reflux Disease)    Asthma  diagnosed   on adbvair denies attacks    DVT (Deep Venous Thrombosis)  left leg 6 m s/p knee replacement in     HTN (Hypertension)    H/O ileostomy  Ileostomy Revesal     S/P LEEP      S/P Exploratory Laparotomy    peritonitis  s/p right knee replacement/ colon resection and ileostomy    S/P Colonoscopy      S/P Endoscopy   gerd    S/P  Section      History of Tubal Ligation  s/p c/section     S/P Knee Surgery  2010      RADIOLOGY/IMAGING/ECHO  < from: CT Angio Chest w/ IV Cont (20 @ 00:49) >    IMPRESSION:    Suboptimal evaluation of the peripheral pulmonary arteries. No obvious central pulmonary embolus or secondary signs of right heart strain.    Commonly reported imaging features of COVID-19 pneumonia.    < end of copied text >    < from: 12 Lead ECG (20 @ 21:56) >    Diagnosis Line NORMAL SINUS RHYTHM  POSSIBLE LEFT ATRIAL ENLARGEMENT  NONSPECIFIC T WAVE ABNORMALITY  ABNORMAL ECG  WHEN COMPARED WITH ECG OF 18-OCT-2017 16:44,  NO SIGNIFICANT CHANGE WAS FOUND    < end of copied text >    Social History: Unable to assess due to clinical condition   ROS: Unable to assess due to clinical condition     Physical Examination: Limited due to clinical condition  General: Intubated, sedated/paralyzed  HEENT: Atraumatic  PULM: Pulse ox 60s, intubated on 24/350/100/18  CVS: HR 120s, MAP above 65  ABD: Obese, soft    Pt is a 60yr old woman with PMHx as above now being managed for viral pneumonia secondary to covid19 infection.    Neuro:  --Paralytic for vent synchrony + Ketamine/Fentanyl/Propofol    Pulm:  --Initiate vent at 24/350/100/18  --f/u ABG, adjust vent prn  --May require proning  --PRN albuterol  --Continue dexamethasone     Cardiac: Shock from vasoplegia +/- sepsis  --Levophed for MAP greater than 65  --echo    ID: Viral pneumonia from covid19  --Blood/sputum cultures ordered  --f/u UA    GI:  --PPI     :  --Strict I/Os  --Replete electrolytes prn  --Received diuresis during RRT    Endo:  --Sliding scale insulin coverage    Prophylaxis:  --Heparin drip for DVT prophylaxis    Case and plan above discussed with Dr. Lee who agrees with above. Pt is a 60yr old owman with PMhx including HTN, AARON, possible asthma, DVT/DVT who presented to the ER on  with chief complaint of SOB and was admitted for viral pneumonia from covid19.    24hr events: Pt intubated for acute hypoxic respiratory failure in the setting of covid19 --> ICU transfer.     ICU Vital Signs Last 24 Hrs  T(C): 37 (2020 20:03), Max: 37.1 (2020 11:55)  T(F): 98.6 (2020 20:03), Max: 98.7 (2020 11:55)  HR: 65 (2020 23:05) (65 - 96)  BP: 125/64 (2020 20:03) (125/64 - 142/79)  BP(mean): --  ABP: --  ABP(mean): --  RR: 22 (2020 20:03) (22 - 51)  SpO2: 87% (2020 23:05) (85% - 97%)    CBC Full  -  ( 2020 03:07 )  WBC Count : 13.21 K/uL  RBC Count : 5.14 M/uL  Hemoglobin : 14.6 g/dL  Hematocrit : 44.9 %  Platelet Count - Automated : 253 K/uL  Mean Cell Volume : 87.4 fl  Mean Cell Hemoglobin : 28.4 pg  Mean Cell Hemoglobin Concentration : 32.5 gm/dL  Auto Neutrophil # : 11.45 K/uL  Auto Lymphocyte # : 0.95 K/uL  Auto Monocyte # : 0.52 K/uL  Auto Eosinophil # : 0.07 K/uL  Auto Basophil # : 0.03 K/uL  Auto Neutrophil % : 86.8 %  Auto Lymphocyte % : 7.2 %  Auto Monocyte % : 3.9 %  Auto Eosinophil % : 0.5 %  Auto Basophil % : 0.2 %        143  |  105  |  23  ----------------------------<  242<H>  5.1   |  25  |  0.53    Ca    9.0      2020 03:07  Phos  2.9     11  Mg     2.2         TPro  7.1  /  Alb  2.9<L>  /  TBili  0.7  /  DBili  x   /  AST  52<H>  /  ALT  27  /  AlkPhos  66  11-23    ABG - ( 2020 03:32 )  pH, Arterial: 7.45  pH, Blood: x     /  pCO2: 46    /  pO2: 46    / HCO3: 32    / Base Excess: 7.1   /  SaO2: 82        CULTURES:    I&O's Summary    2020 07:01  -  2020 07:00  --------------------------------------------------------  IN: 680 mL / OUT: 1800 mL / NET: -1120 mL    2020 07:  -  2020 04:02  --------------------------------------------------------  IN: 100 mL / OUT: 500 mL / NET: -400 mL      MEDICATIONS  (STANDING):  budesonide  80 MICROgram(s)/formoterol 4.5 MICROgram(s) Inhaler 2 Puff(s) Inhalation two times a day  dexAMETHasone  Injectable 6 milliGRAM(s) IV Push daily  dextrose 40% Gel 15 Gram(s) Oral once  dextrose 5%. 1000 milliLiter(s) (50 mL/Hr) IV Continuous <Continuous>  dextrose 5%. 1000 milliLiter(s) (100 mL/Hr) IV Continuous <Continuous>  dextrose 50% Injectable 25 Gram(s) IV Push once  dextrose 50% Injectable 12.5 Gram(s) IV Push once  dextrose 50% Injectable 25 Gram(s) IV Push once  glucagon  Injectable 1 milliGRAM(s) IntraMuscular once  insulin lispro (ADMELOG) corrective regimen sliding scale   SubCutaneous three times a day before meals  insulin lispro (ADMELOG) corrective regimen sliding scale   SubCutaneous at bedtime  lisinopril 20 milliGRAM(s) Oral daily  metoprolol tartrate 50 milliGRAM(s) Oral two times a day  rivaroxaban 20 milliGRAM(s) Oral with dinner    MEDICATIONS  (PRN):  ALBUTerol    90 MICROgram(s) HFA Inhaler 2 Puff(s) Inhalation every 6 hours PRN Shortness of Breath and/or Wheezing    Kiwi (Unknown)  latex (Anaphylaxis)  latex (Unknown)  penicillin (Other)  penicillin (Unknown)  perfume  hives (Other)  potassium acetate (Other)  soap additives hives (Other)    FAMILY HISTORY:  Family history of gastric cancer    Family history of breast cancer (Grandparent)    PAST MEDICAL & SURGICAL HISTORY:  Pneumomediastinum    Umbilical hernia  Reduciable    Osteoarthritis of both knees, unspecified osteoarthritis type    AARON (Obstructive Sleep Apnea)  category II pt uses c/pap pt to bering to hospital/  OR booking notified    Pneumonia      GERD (Gastroesophageal Reflux Disease)    Asthma  diagnosed   on adbvair denies attacks    DVT (Deep Venous Thrombosis)  left leg 6 m s/p knee replacement in     HTN (Hypertension)    H/O ileostomy  Ileostomy Revesal     S/P LEEP      S/P Exploratory Laparotomy    peritonitis  s/p right knee replacement/ colon resection and ileostomy    S/P Colonoscopy      S/P Endoscopy   gerd    S/P  Section      History of Tubal Ligation  s/p c/section     S/P Knee Surgery  2010      RADIOLOGY/IMAGING/ECHO  < from: CT Angio Chest w/ IV Cont (20 @ 00:49) >    IMPRESSION:    Suboptimal evaluation of the peripheral pulmonary arteries. No obvious central pulmonary embolus or secondary signs of right heart strain.    Commonly reported imaging features of COVID-19 pneumonia.    < end of copied text >    < from: 12 Lead ECG (20 @ 21:56) >    Diagnosis Line NORMAL SINUS RHYTHM  POSSIBLE LEFT ATRIAL ENLARGEMENT  NONSPECIFIC T WAVE ABNORMALITY  ABNORMAL ECG  WHEN COMPARED WITH ECG OF 18-OCT-2017 16:44,  NO SIGNIFICANT CHANGE WAS FOUND    < end of copied text >    Social History: Unable to assess due to clinical condition   ROS: Unable to assess due to clinical condition     Physical Examination: Limited due to clinical condition  General: Intubated, sedated/paralyzed  HEENT: Atraumatic  PULM: Pulse ox 60s, intubated on 24/350/100/18  CVS: HR 120s, MAP above 65  ABD: Obese, soft    Pt is a 60yr old woman with PMHx as above now being managed for viral pneumonia secondary to covid19 infection.    Neuro:  --Paralytic for vent synchrony + Ketamine/Fentanyl/Propofol    Pulm:  --Initiate vent at 24/350/100/18  --f/u ABG, adjust vent prn  --May require proning  --PRN albuterol  --Continue dexamethasone     Cardiac: Shock from vasoplegia +/- sepsis  --Levophed for MAP greater than 65  --echo    ID: Viral pneumonia from covid19  --Blood/sputum cultures ordered  --f/u UA    GI:  --PPI     :  --Strict I/Os  --Replete electrolytes prn  --Received diuresis during RRT    Endo:  --Sliding scale insulin coverage    Prophylaxis:  --Heparin drip for DVT prophylaxis given prior history of DVT and patient treated with Xarelto    Case and plan above discussed with Dr. Lee who agrees with above.    Attending Attestation  Agree with above. Patient seen and examined on [facility] and then in COVID-ICU. Patient is a pleasant 60F obesity, AARON, HTN, prior DVT/PE still on AC who p/w 9-10 days of cough, shortness of breath, diarrhea and fevers. She was found to be COVID positive. On the medical floors she had progressive hypoxia and tachypnea. At the time of my evaluation she was breathing 50 breaths per minute and saturating in the 70s. Patient facetimed with her  and was then agreeable to intubation for her progressive hypoxia. She was also cyanotic on her fingertips. She was intubated by anesthesia on [facility] and was saturating 50-60%.    1. Acute Hypoxemic Respiratory Failure - ARDS due to COVID-19. Will place A-line and monitor PO2. Will paralyze patient and monitor for improvement. She did not tolerate proning while on BIPAP and this may be difficult given her body habitus. She derecruits very easily. Bronchodilators and secretion management. Goal O2 sat > 90% as able. Diuresis as able. CXR looks bad.  2. Oropharyngeal dysphagia - will need to place OGT for feeds.  3. Shock state - likely due to sepsis and vasoplegia from sedation. Will place TLC and start vasopressors with goal MAP > 65. Place negro catheter and monitor urine output.  4. Neurologic - patient was AAOx3 but cyanotic and dyspneic prior to intubation. Now on sedation and will need paralytics in hopes of achieving a stable pO2  5. COVID-19 - patient has completed Remdesivir. Continue Decadron.     Patient is critically ill with COVI-19. Her prognosis is guarded. Fortunately to this point her renal function remains normal.

## 2020-11-23 NOTE — PROGRESS NOTE ADULT - ASSESSMENT
60F w/ pmhx morbid obesity, AARON, HTN, DVT&PE in the past, ?Asthma now p/w a 9 day hx of cough/SOB, f adm with sepsis 2/2 acute hypoxic respiratory failure d/t COVID+ pneumonia, worsening, and intubated 11/22/20 for worsening respiratory failure and brought to the ICU.     Neuro:  -- Paralyzed on nimbex while proned   -- Sedated with Propofol and Fentanyl   -- Titrate sedation to vent synchrony    Cardio:  -- Hypotensive likely 2/2 sedation  -- Continue with levophed maintain MAP greater than 65    Resp:  -- Hypoxic Respiratory Failure 2/2 COVID infection  -- Intubated 11/22/20  -- PRVC 28/350/100/18. Proned 11/23/20 @ 12:00 PM  -- Supine after 18 hours  -- Titrate FIO2 as per ABG    GI:   -- No acute issues   -- Start TF trickle feeds @ 10 while proned     Renal:  -- No acute issues at this time  -- Rivera placed 11/23. Strict I/Os  -- Monitor renal indices daily     Infectious Disease:  -- S/P Remdesivir (11/18) and continuing Decadron (11/18)  -- Follow up cultures sent overnight. Monitoring off of abx at this time     Endocrine:  - ISS  - Lantus 10 QHS    Lines/Drains:  -- TLC placed 11/22  -- Arterial line placed 11/22  -- Rivera catheter placed 11/22    Disposition:  -- Remain in the ICU

## 2020-11-23 NOTE — RAPID RESPONSE TEAM SUMMARY - NSADDTLFINDINGSRRT_GEN_ALL_CORE
Upon evaluation, patient initially on recliner, spo2 ranging 85-89% on AVAPS (fiO2 100%), RR 40s, HR 80s, normotensive, & afebrile. Patient given Albuterol MDI by primary team, given Lasix 40mg IVP x 1 during RRT, and subsequently placed in her bed, and proned x 15 mins. Patient remained hypoxic 79-83%, tachypneic 40s, with no clinical improvement. Initial ABG  performed while prone with noting Po2 46. After much discussion with patient, and subsequently -who patient requested to help with decision, patient agreed with plan to intubate for further management of acute hypoxemic respiratory failure. Patient s/p rapid sequence intubation by Anesthesia, however with difficulty improving her hypoxia (saturations in 50-60%). Post-intubation ABG w/ worsening hypoxemia (PO2 31). Urgent CXR performed at bedside confirming ETT placement, after much repositioning HOB & patient on bed, s/p additional sedatives & paralytics, was able to achieve spo2 85%. Patient accepted for 8ICU transfer for further management. Upon evaluation, patient initially on recliner, spo2 ranging 85-89% on AVAPS (fiO2 100%), RR 40s, HR 80s, normotensive, & afebrile. Patient given Albuterol MDI by primary team, given Lasix 40mg IVP x 1 during RRT, and subsequently placed in her bed, and proned x 15 mins. Patient remained hypoxic 79-83%, tachypneic 40s, with no clinical improvement. Initial ABG  performed while prone with noting Po2 46. After much discussion with patient, and subsequently -who patient requested to help with decision, patient agreed with plan to intubate for further management of acute hypoxemic respiratory failure. Patient s/p rapid sequence intubation by Anesthesia, however post-intubation remained hypoxic  with saturations in 50-60%. Post-intubation ABG w/ worsening hypoxemia (PO2 31). Urgent CXR performed at bedside confirming ETT placement, after much repositioning HOB & patient on bed, s/p additional sedatives & paralytics, was able to achieve spo2 85%. Patient accepted for 8ICU transfer for further management. Upon evaluation, patient initially on recliner, spo2 ranging 85-89% on AVAPS (fiO2 100%), RR 40s, HR 80s, normotensive, & afebrile. Patient given Albuterol MDI by primary team, given Lasix 40mg IVP x 1 during RRT, and subsequently placed in her bed, and proned x 15 mins. Patient remained hypoxic 79-83%, tachypneic 40s, with no clinical improvement. Initial ABG  performed while prone with noting Po2 46. After much discussion with patient, and subsequently -who patient requested to help with decision, patient agreed with plan to intubate for further management of acute hypoxemic respiratory failure. Patient s/p rapid sequence intubation by Anesthesia, however post-intubation remained hypoxic  with saturations in 50-60%. Post-intubation ABG w/ worsening hypoxemia (PO2 31). Urgent CXR performed at bedside confirming ETT placement, after much repositioning HOB & patient on bed, s/p additional sedatives & paralytics, was able to achieve spo2 85%. Patient accepted for 5ICU transfer for further management.

## 2020-11-24 LAB
A1C WITH ESTIMATED AVERAGE GLUCOSE RESULT: 7.5 % — HIGH (ref 4–5.6)
ANION GAP SERPL CALC-SCNC: 13 MMOL/L — SIGNIFICANT CHANGE UP (ref 5–17)
APTT BLD: 55.1 SEC — HIGH (ref 27.5–35.5)
APTT BLD: 61.5 SEC — HIGH (ref 27.5–35.5)
B-OH-BUTYR SERPL-SCNC: 0.5 MMOL/L — HIGH
BUN SERPL-MCNC: 35 MG/DL — HIGH (ref 7–23)
CALCIUM SERPL-MCNC: 8.5 MG/DL — SIGNIFICANT CHANGE UP (ref 8.4–10.5)
CHLORIDE SERPL-SCNC: 102 MMOL/L — SIGNIFICANT CHANGE UP (ref 96–108)
CO2 SERPL-SCNC: 27 MMOL/L — SIGNIFICANT CHANGE UP (ref 22–31)
CREAT SERPL-MCNC: 1.64 MG/DL — HIGH (ref 0.5–1.3)
CULTURE RESULTS: SIGNIFICANT CHANGE UP
CULTURE RESULTS: SIGNIFICANT CHANGE UP
ESTIMATED AVERAGE GLUCOSE: 169 MG/DL — HIGH (ref 68–114)
GAS PNL BLDA: SIGNIFICANT CHANGE UP
GLUCOSE BLDC GLUCOMTR-MCNC: 110 MG/DL — HIGH (ref 70–99)
GLUCOSE BLDC GLUCOMTR-MCNC: 120 MG/DL — HIGH (ref 70–99)
GLUCOSE BLDC GLUCOMTR-MCNC: 126 MG/DL — HIGH (ref 70–99)
GLUCOSE BLDC GLUCOMTR-MCNC: 126 MG/DL — HIGH (ref 70–99)
GLUCOSE BLDC GLUCOMTR-MCNC: 135 MG/DL — HIGH (ref 70–99)
GLUCOSE BLDC GLUCOMTR-MCNC: 145 MG/DL — HIGH (ref 70–99)
GLUCOSE BLDC GLUCOMTR-MCNC: 151 MG/DL — HIGH (ref 70–99)
GLUCOSE BLDC GLUCOMTR-MCNC: 158 MG/DL — HIGH (ref 70–99)
GLUCOSE BLDC GLUCOMTR-MCNC: 159 MG/DL — HIGH (ref 70–99)
GLUCOSE BLDC GLUCOMTR-MCNC: 165 MG/DL — HIGH (ref 70–99)
GLUCOSE BLDC GLUCOMTR-MCNC: 177 MG/DL — HIGH (ref 70–99)
GLUCOSE BLDC GLUCOMTR-MCNC: 181 MG/DL — HIGH (ref 70–99)
GLUCOSE BLDC GLUCOMTR-MCNC: 191 MG/DL — HIGH (ref 70–99)
GLUCOSE BLDC GLUCOMTR-MCNC: 204 MG/DL — HIGH (ref 70–99)
GLUCOSE BLDC GLUCOMTR-MCNC: 204 MG/DL — HIGH (ref 70–99)
GLUCOSE BLDC GLUCOMTR-MCNC: 212 MG/DL — HIGH (ref 70–99)
GLUCOSE BLDC GLUCOMTR-MCNC: 216 MG/DL — HIGH (ref 70–99)
GLUCOSE BLDC GLUCOMTR-MCNC: 216 MG/DL — HIGH (ref 70–99)
GLUCOSE BLDC GLUCOMTR-MCNC: 259 MG/DL — HIGH (ref 70–99)
GLUCOSE BLDC GLUCOMTR-MCNC: 261 MG/DL — HIGH (ref 70–99)
GLUCOSE BLDC GLUCOMTR-MCNC: 274 MG/DL — HIGH (ref 70–99)
GLUCOSE BLDC GLUCOMTR-MCNC: 275 MG/DL — HIGH (ref 70–99)
GLUCOSE SERPL-MCNC: 370 MG/DL — HIGH (ref 70–99)
GRAM STN FLD: SIGNIFICANT CHANGE UP
HCT VFR BLD CALC: 46 % — HIGH (ref 34.5–45)
HGB BLD-MCNC: 13.8 G/DL — SIGNIFICANT CHANGE UP (ref 11.5–15.5)
INR BLD: 1.34 RATIO — HIGH (ref 0.88–1.16)
MAGNESIUM SERPL-MCNC: 2 MG/DL — SIGNIFICANT CHANGE UP (ref 1.6–2.6)
MCHC RBC-ENTMCNC: 27.4 PG — SIGNIFICANT CHANGE UP (ref 27–34)
MCHC RBC-ENTMCNC: 30 GM/DL — LOW (ref 32–36)
MCV RBC AUTO: 91.5 FL — SIGNIFICANT CHANGE UP (ref 80–100)
NRBC # BLD: 0 /100 WBCS — SIGNIFICANT CHANGE UP (ref 0–0)
PHOSPHATE 24H UR-MCNC: 68 MG/DL — SIGNIFICANT CHANGE UP
PHOSPHATE SERPL-MCNC: 4.5 MG/DL — SIGNIFICANT CHANGE UP (ref 2.5–4.5)
PLATELET # BLD AUTO: 256 K/UL — SIGNIFICANT CHANGE UP (ref 150–400)
POTASSIUM SERPL-MCNC: 4.2 MMOL/L — SIGNIFICANT CHANGE UP (ref 3.5–5.3)
POTASSIUM SERPL-SCNC: 4.2 MMOL/L — SIGNIFICANT CHANGE UP (ref 3.5–5.3)
POTASSIUM UR-SCNC: 21 MMOL/L — SIGNIFICANT CHANGE UP
PROT ?TM UR-MCNC: 40 MG/DL — HIGH (ref 0–12)
PROTHROM AB SERPL-ACNC: 15.9 SEC — HIGH (ref 10.6–13.6)
RBC # BLD: 5.03 M/UL — SIGNIFICANT CHANGE UP (ref 3.8–5.2)
RBC # FLD: 16 % — HIGH (ref 10.3–14.5)
SODIUM SERPL-SCNC: 142 MMOL/L — SIGNIFICANT CHANGE UP (ref 135–145)
SODIUM UR-SCNC: <35 MMOL/L — SIGNIFICANT CHANGE UP
SPECIMEN SOURCE: SIGNIFICANT CHANGE UP
URATE UR-MCNC: 59.6 MG/DL — SIGNIFICANT CHANGE UP
UUN UR-MCNC: 449 MG/DL — SIGNIFICANT CHANGE UP
WBC # BLD: 17.92 K/UL — HIGH (ref 3.8–10.5)
WBC # FLD AUTO: 17.92 K/UL — HIGH (ref 3.8–10.5)

## 2020-11-24 PROCEDURE — 99291 CRITICAL CARE FIRST HOUR: CPT

## 2020-11-24 RX ADMIN — CHLORHEXIDINE GLUCONATE 15 MILLILITER(S): 213 SOLUTION TOPICAL at 05:31

## 2020-11-24 RX ADMIN — CHLORHEXIDINE GLUCONATE 15 MILLILITER(S): 213 SOLUTION TOPICAL at 17:41

## 2020-11-24 RX ADMIN — CISATRACURIUM BESYLATE 24.5 MICROGRAM(S)/KG/MIN: 2 INJECTION INTRAVENOUS at 12:18

## 2020-11-24 RX ADMIN — FENTANYL CITRATE 3.4 MICROGRAM(S)/KG/HR: 50 INJECTION INTRAVENOUS at 12:17

## 2020-11-24 RX ADMIN — MIDAZOLAM HYDROCHLORIDE 2.72 MG/KG/HR: 1 INJECTION, SOLUTION INTRAMUSCULAR; INTRAVENOUS at 12:18

## 2020-11-24 RX ADMIN — Medication 12.8 MICROGRAM(S)/KG/MIN: at 12:17

## 2020-11-24 RX ADMIN — PROPOFOL 40.8 MICROGRAM(S)/KG/MIN: 10 INJECTION, EMULSION INTRAVENOUS at 12:17

## 2020-11-24 RX ADMIN — Medication 6 MILLIGRAM(S): at 05:31

## 2020-11-24 RX ADMIN — PANTOPRAZOLE SODIUM 40 MILLIGRAM(S): 20 TABLET, DELAYED RELEASE ORAL at 12:17

## 2020-11-24 RX ADMIN — INSULIN HUMAN 3 UNIT(S)/HR: 100 INJECTION, SOLUTION SUBCUTANEOUS at 12:18

## 2020-11-24 RX ADMIN — CHLORHEXIDINE GLUCONATE 1 APPLICATION(S): 213 SOLUTION TOPICAL at 05:32

## 2020-11-24 NOTE — PROGRESS NOTE ADULT - ATTENDING COMMENTS
60F w/ pmhx morbid obesity, AARON, HTN, DVT&PE in the past, p/w  acute hypoxic respiratory failure d/t COVID+ pneumonia,  and now ARDS. Pt intubated and  Stable sat currently after paralysis and proning. Now supine, cont proning if has severe decrease p/f ratio. Cont LTV ventilation. Cont paralysis and monitor for synchrony and TOF.  Cont pressors to maintain map>65. Cont sedation for analgosedation and synchrony. Titrate fio2/peep to sat>90% if possible. Higher peep used for morbid obesity and body habitus causing increased chest wall elastance. Cont a/c for VTE hx and covid, monitor PTT and for bleeding. Prognosis guarded.

## 2020-11-24 NOTE — PROGRESS NOTE ADULT - ASSESSMENT
60F w/ pmhx morbid obesity, AARON, HTN, DVT&PE in the past, ?Asthma now p/w a 9 day hx of cough/SOB, f adm with sepsis 2/2 acute hypoxic respiratory failure d/t COVID+ pneumonia, worsening, and intubated 11/22/20 for worsening respiratory failure and brought to the ICU.     Neuro:  -- Paralyzed on nimbex. Will continue in anticipation for additional proning  -- Sedated with Propofol and Fentanyl   -- Titrate sedation to vent synchrony    Cardio:  -- Hypotensive likely 2/2 sedation  -- Continue with levophed maintain MAP greater than 65    Resp:  -- Hypoxic Respiratory Failure 2/2 COVID infection  -- Intubated 11/22/20  -- PRVC 28/350/70/18. Proned 11/23/20 @ 12:00 PM. Supined 11/24/20 @ 6AM  -- ABG improving. Continue to titrate FIO2 and PEEP  -- Aggressive Pulmonary toileting     GI:   -- No acute issues   -- Start TF trickle feeds @ 10 while proned     Renal:  -- Patient with ARTEMIO at this time 0.53-->1.64  -- Rivera placed 11/23. Strict I/Os  -- Monitor renal indices daily     Infectious Disease:  -- S/P Remdesivir (11/18) and continuing Decadron (11/18)  -- Monitoring off of abx at this time     Endocrine:  -- Hyperglycemic prompting initiation of insulin gtt  -- Transition patient from insulin gtt once FS stable    Lines/Drains:  -- TLC placed 11/22  -- Arterial line placed 11/22  -- Rivera catheter placed 11/22    Disposition:  -- Remain in the ICU

## 2020-11-24 NOTE — PROGRESS NOTE ADULT - SUBJECTIVE AND OBJECTIVE BOX
HPI:  60F w/pmhx AARON, HTN, 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 Patient reports that she also had a general feeling of "uneasiness" that she could not explain for the last week. No chest pain. No pleuritic pain. No leg swelling or pain. Denies myalgias. Denies sick contacts. Reports that her  and son live with her and they have tested so far negative for covid and are asymptomatic, patient is not sure where was exposed. Reports trying her symbicort this week for shortness of breath without much relief. Patient yesterday began to have fevers with highest fever at 104F which alarmed her and for that reason she came to the hospital for help.  (18 Nov 2020 05:46)      24 hr events: No acute events overnight. Patient supined at 6AM with improvement in oxygenation per ABG. Continuing to titrate FIO2. Patient to remain on nimbex for now while assessing serial ABGs today. Will continue to assess for need for additional proning. Blood glucose levels noted to be elevated overnight prompting initiation of insulin gtt with FS improving at this time.        ## Labs:  CBC:                        13.8   17.92 )-----------( 256      ( 24 Nov 2020 01:36 )             46.0     Chem:  11-24    142  |  102  |  35<H>  ----------------------------<  370<H>  4.2   |  27  |  1.64<H>    Ca    8.5      24 Nov 2020 01:36  Phos  4.5     11-24  Mg     2.0     11-24    TPro  7.1  /  Alb  2.9<L>  /  TBili  0.7  /  DBili  x   /  AST  52<H>  /  ALT  27  /  AlkPhos  66  11-23    Coags:  PT/INR - ( 24 Nov 2020 01:36 )   PT: 15.9 sec;   INR: 1.34 ratio         PTT - ( 24 Nov 2020 03:08 )  PTT:61.5 sec        ## Imaging:    ## Medications:    norepinephrine Infusion 0.05 MICROgram(s)/kG/Min IV Continuous <Continuous>      dexAMETHasone  Injectable 6 milliGRAM(s) IV Push daily  insulin regular Infusion 3 Unit(s)/Hr IV Continuous <Continuous>    heparin  Infusion 1000 Unit(s)/Hr IV Continuous <Continuous>    pantoprazole  Injectable 40 milliGRAM(s) IV Push daily    cisatracurium Infusion 3 MICROgram(s)/kG/Min IV Continuous <Continuous>  fentaNYL   Infusion... 0.5 MICROgram(s)/kG/Hr IV Continuous <Continuous>  midazolam Infusion 0.02 mG/kG/Hr IV Continuous <Continuous>  propofol Infusion 50 MICROgram(s)/kG/Min IV Continuous <Continuous>      ## Vitals:  T(C): 36.2 (11-24-20 @ 11:00), Max: 36.6 (11-24-20 @ 08:00)  HR: 58 (11-24-20 @ 12:14) (47 - 98)  BP: --  BP(mean): --  RR: 28 (11-24-20 @ 11:00) (17 - 29)  SpO2: 97% (11-24-20 @ 12:14) (91% - 99%)  Wt(kg): --  Vent: Mode: AC/ CMV (Assist Control/ Continuous Mandatory Ventilation), RR (machine): 28, RR (patient): 28, TV (machine): 350, FiO2: 80, PEEP: 18, PIP: 39  ABG: ABG - ( 24 Nov 2020 08:09 )  pH, Arterial: 7.30  pH, Blood: x     /  pCO2: 58    /  pO2: 122   / HCO3: 28    / Base Excess: .4    /  SaO2: 98                    11-23 @ 07:01  -  11-24 @ 07:00  --------------------------------------------------------  IN: 2865.1 mL / OUT: 592 mL / NET: 2273.1 mL    11-24 @ 07:01  -  11-24 @ 12:19  --------------------------------------------------------  IN: 380.6 mL / OUT: 100 mL / NET: 280.6 mL          ## P/E:  Gen: lying comfortably in bed in no apparent distress  Mouth: moist membranes   Neck: Supple, No JVD  Lungs: CTA B/L   Heart: RRR S1S2  Abd: Soft, non-distended   Ext: Warm and perfused   Neuro: Paralyzed and sedated     CODE STATUS: [X] full code  [ ] DNR  [ ] DNI  [ ] [facility]  Goals of care discussion: [ ] yes

## 2020-11-25 LAB
ANION GAP SERPL CALC-SCNC: 13 MMOL/L — SIGNIFICANT CHANGE UP (ref 5–17)
APTT BLD: 46.3 SEC — HIGH (ref 27.5–35.5)
APTT BLD: 66.7 SEC — HIGH (ref 27.5–35.5)
APTT BLD: 83.7 SEC — HIGH (ref 27.5–35.5)
BUN SERPL-MCNC: 40 MG/DL — HIGH (ref 7–23)
CALCIUM SERPL-MCNC: 8.7 MG/DL — SIGNIFICANT CHANGE UP (ref 8.4–10.5)
CHLORIDE SERPL-SCNC: 106 MMOL/L — SIGNIFICANT CHANGE UP (ref 96–108)
CO2 SERPL-SCNC: 27 MMOL/L — SIGNIFICANT CHANGE UP (ref 22–31)
CREAT SERPL-MCNC: 2.18 MG/DL — HIGH (ref 0.5–1.3)
CRP SERPL-MCNC: 19.29 MG/DL — HIGH (ref 0–0.4)
D DIMER BLD IA.RAPID-MCNC: 1336 NG/ML DDU — HIGH
FERRITIN SERPL-MCNC: 568 NG/ML — HIGH (ref 15–150)
GAS PNL BLDA: SIGNIFICANT CHANGE UP
GAS PNL BLDA: SIGNIFICANT CHANGE UP
GLUCOSE BLDC GLUCOMTR-MCNC: 105 MG/DL — HIGH (ref 70–99)
GLUCOSE BLDC GLUCOMTR-MCNC: 106 MG/DL — HIGH (ref 70–99)
GLUCOSE BLDC GLUCOMTR-MCNC: 110 MG/DL — HIGH (ref 70–99)
GLUCOSE BLDC GLUCOMTR-MCNC: 114 MG/DL — HIGH (ref 70–99)
GLUCOSE BLDC GLUCOMTR-MCNC: 116 MG/DL — HIGH (ref 70–99)
GLUCOSE BLDC GLUCOMTR-MCNC: 124 MG/DL — HIGH (ref 70–99)
GLUCOSE BLDC GLUCOMTR-MCNC: 124 MG/DL — HIGH (ref 70–99)
GLUCOSE BLDC GLUCOMTR-MCNC: 127 MG/DL — HIGH (ref 70–99)
GLUCOSE BLDC GLUCOMTR-MCNC: 132 MG/DL — HIGH (ref 70–99)
GLUCOSE BLDC GLUCOMTR-MCNC: 134 MG/DL — HIGH (ref 70–99)
GLUCOSE BLDC GLUCOMTR-MCNC: 138 MG/DL — HIGH (ref 70–99)
GLUCOSE BLDC GLUCOMTR-MCNC: 140 MG/DL — HIGH (ref 70–99)
GLUCOSE BLDC GLUCOMTR-MCNC: 148 MG/DL — HIGH (ref 70–99)
GLUCOSE BLDC GLUCOMTR-MCNC: 163 MG/DL — HIGH (ref 70–99)
GLUCOSE BLDC GLUCOMTR-MCNC: 168 MG/DL — HIGH (ref 70–99)
GLUCOSE BLDC GLUCOMTR-MCNC: 178 MG/DL — HIGH (ref 70–99)
GLUCOSE BLDC GLUCOMTR-MCNC: 185 MG/DL — HIGH (ref 70–99)
GLUCOSE BLDC GLUCOMTR-MCNC: 199 MG/DL — HIGH (ref 70–99)
GLUCOSE BLDC GLUCOMTR-MCNC: 201 MG/DL — HIGH (ref 70–99)
GLUCOSE BLDC GLUCOMTR-MCNC: 208 MG/DL — HIGH (ref 70–99)
GLUCOSE BLDC GLUCOMTR-MCNC: 210 MG/DL — HIGH (ref 70–99)
GLUCOSE BLDC GLUCOMTR-MCNC: 212 MG/DL — HIGH (ref 70–99)
GLUCOSE BLDC GLUCOMTR-MCNC: 218 MG/DL — HIGH (ref 70–99)
GLUCOSE BLDC GLUCOMTR-MCNC: 225 MG/DL — HIGH (ref 70–99)
GLUCOSE SERPL-MCNC: 131 MG/DL — HIGH (ref 70–99)
GRAM STN FLD: SIGNIFICANT CHANGE UP
HCT VFR BLD CALC: 41.7 % — SIGNIFICANT CHANGE UP (ref 34.5–45)
HGB BLD-MCNC: 12.8 G/DL — SIGNIFICANT CHANGE UP (ref 11.5–15.5)
INR BLD: 1.08 RATIO — SIGNIFICANT CHANGE UP (ref 0.88–1.16)
LDH SERPL L TO P-CCNC: 359 U/L — HIGH (ref 50–242)
MAGNESIUM SERPL-MCNC: 2.1 MG/DL — SIGNIFICANT CHANGE UP (ref 1.6–2.6)
MCHC RBC-ENTMCNC: 28 PG — SIGNIFICANT CHANGE UP (ref 27–34)
MCHC RBC-ENTMCNC: 30.7 GM/DL — LOW (ref 32–36)
MCV RBC AUTO: 91.2 FL — SIGNIFICANT CHANGE UP (ref 80–100)
NRBC # BLD: 0 /100 WBCS — SIGNIFICANT CHANGE UP (ref 0–0)
PHOSPHATE SERPL-MCNC: 4.4 MG/DL — SIGNIFICANT CHANGE UP (ref 2.5–4.5)
PLATELET # BLD AUTO: 234 K/UL — SIGNIFICANT CHANGE UP (ref 150–400)
POTASSIUM SERPL-MCNC: 4.3 MMOL/L — SIGNIFICANT CHANGE UP (ref 3.5–5.3)
POTASSIUM SERPL-SCNC: 4.3 MMOL/L — SIGNIFICANT CHANGE UP (ref 3.5–5.3)
PROTHROM AB SERPL-ACNC: 12.9 SEC — SIGNIFICANT CHANGE UP (ref 10.6–13.6)
RBC # BLD: 4.57 M/UL — SIGNIFICANT CHANGE UP (ref 3.8–5.2)
RBC # FLD: 16.1 % — HIGH (ref 10.3–14.5)
SODIUM SERPL-SCNC: 146 MMOL/L — HIGH (ref 135–145)
SPECIMEN SOURCE: SIGNIFICANT CHANGE UP
TRIGL SERPL-MCNC: 166 MG/DL — HIGH
WBC # BLD: 16.29 K/UL — HIGH (ref 3.8–10.5)
WBC # FLD AUTO: 16.29 K/UL — HIGH (ref 3.8–10.5)

## 2020-11-25 PROCEDURE — 93306 TTE W/DOPPLER COMPLETE: CPT | Mod: 26

## 2020-11-25 PROCEDURE — 99291 CRITICAL CARE FIRST HOUR: CPT

## 2020-11-25 RX ORDER — HEPARIN SODIUM 5000 [USP'U]/ML
1500 INJECTION INTRAVENOUS; SUBCUTANEOUS
Qty: 25000 | Refills: 0 | Status: DISCONTINUED | OUTPATIENT
Start: 2020-11-25 | End: 2020-11-28

## 2020-11-25 RX ORDER — CISATRACURIUM BESYLATE 2 MG/ML
3 INJECTION INTRAVENOUS
Qty: 200 | Refills: 0 | Status: DISCONTINUED | OUTPATIENT
Start: 2020-11-25 | End: 2020-12-01

## 2020-11-25 RX ORDER — INSULIN GLARGINE 100 [IU]/ML
20 INJECTION, SOLUTION SUBCUTANEOUS ONCE
Refills: 0 | Status: COMPLETED | OUTPATIENT
Start: 2020-11-25 | End: 2020-11-25

## 2020-11-25 RX ORDER — INSULIN HUMAN 100 [IU]/ML
3 INJECTION, SOLUTION SUBCUTANEOUS
Qty: 100 | Refills: 0 | Status: DISCONTINUED | OUTPATIENT
Start: 2020-11-25 | End: 2020-11-29

## 2020-11-25 RX ORDER — ALBUMIN HUMAN 25 %
50 VIAL (ML) INTRAVENOUS EVERY 12 HOURS
Refills: 0 | Status: COMPLETED | OUTPATIENT
Start: 2020-11-25 | End: 2020-11-26

## 2020-11-25 RX ADMIN — PANTOPRAZOLE SODIUM 40 MILLIGRAM(S): 20 TABLET, DELAYED RELEASE ORAL at 11:49

## 2020-11-25 RX ADMIN — CHLORHEXIDINE GLUCONATE 15 MILLILITER(S): 213 SOLUTION TOPICAL at 05:08

## 2020-11-25 RX ADMIN — INSULIN GLARGINE 20 UNIT(S): 100 INJECTION, SOLUTION SUBCUTANEOUS at 12:01

## 2020-11-25 RX ADMIN — CHLORHEXIDINE GLUCONATE 1 APPLICATION(S): 213 SOLUTION TOPICAL at 05:09

## 2020-11-25 RX ADMIN — CHLORHEXIDINE GLUCONATE 15 MILLILITER(S): 213 SOLUTION TOPICAL at 17:08

## 2020-11-25 RX ADMIN — Medication 12.8 MICROGRAM(S)/KG/MIN: at 20:58

## 2020-11-25 RX ADMIN — Medication 50 MILLILITER(S): at 18:22

## 2020-11-25 RX ADMIN — MIDAZOLAM HYDROCHLORIDE 2.72 MG/KG/HR: 1 INJECTION, SOLUTION INTRAMUSCULAR; INTRAVENOUS at 20:57

## 2020-11-25 RX ADMIN — Medication 6 MILLIGRAM(S): at 05:08

## 2020-11-25 RX ADMIN — CISATRACURIUM BESYLATE 24.5 MICROGRAM(S)/KG/MIN: 2 INJECTION INTRAVENOUS at 20:58

## 2020-11-25 RX ADMIN — FENTANYL CITRATE 3.4 MICROGRAM(S)/KG/HR: 50 INJECTION INTRAVENOUS at 20:57

## 2020-11-25 RX ADMIN — PROPOFOL 40.8 MICROGRAM(S)/KG/MIN: 10 INJECTION, EMULSION INTRAVENOUS at 20:58

## 2020-11-25 RX ADMIN — HEPARIN SODIUM 15 UNIT(S)/HR: 5000 INJECTION INTRAVENOUS; SUBCUTANEOUS at 03:00

## 2020-11-25 NOTE — PROGRESS NOTE ADULT - ATTENDING COMMENTS
60F w/ pmhx morbid obesity, AARON, HTN, DVT&PE in the past, p/w  acute hypoxic respiratory failure d/t COVID+ pneumonia,  and now ARDS. Pt intubated and  Stable sat currently after paralysis and proning. Now supine, not proning now as fio2 decreasing. Cont LTV ventilation. Cont paralysis and monitor for synchrony and TOF.  Cont pressors to maintain map>65. Cont sedation for analgosedation and synchrony. Titrate fio2/peep to sat>90% if possible. Higher peep used for morbid obesity and body habitus causing increased chest wall elastance. Cont a/c for VTE hx and covid, monitor PTT and for bleeding. Pt w/ ARTEMIO that is nonoliguric. Kilo decreased, trial of volume resuscitation.  Prognosis guarded.

## 2020-11-25 NOTE — PROGRESS NOTE ADULT - SUBJECTIVE AND OBJECTIVE BOX
24 hour events:     Review of Systems:  Constitutional: No fever, chills, fatigue  Neuro: No headache, numbness, weakness  Resp: No cough, wheezing, shortness of breath  CVS: No chest pain, palpitations, leg swelling  GI: No abdominal pain, nausea, vomiting, diarrhea   : No dysuria, frequency, incontinence  Skin: No itching, burning, rashes, or lesions   Msk: No joint pain or swelling  Psych: No depression, anxiety, mood swings    T(F): 97.2 (11-25-20 @ 07:00), Max: 109.4 (11-24-20 @ 22:15)  HR: 52 (11-25-20 @ 11:35) (38 - 83)  BP: --  RR: 28 (11-25-20 @ 10:15) (28 - 33)  SpO2: 99% (11-25-20 @ 11:35) (92% - 100%)  Wt(kg): --    Mode: AC/ CMV (Assist Control/ Continuous Mandatory Ventilation), RR (machine): 28, TV (machine): 350, FiO2: 80, PEEP: 18  11-24-20 @ 07:01  -  11-25-20 @ 07:00  --------------------------------------------------------  IN: 2563.7 mL / OUT: 837 mL / NET: 1726.7 mL    11-25-20 @ 07:01  -  11-25-20 @ 11:57  --------------------------------------------------------  IN: 312.2 mL / OUT: 170 mL / NET: 142.2 mL        CAPILLARY BLOOD GLUCOSE      POCT Blood Glucose.: 178 mg/dL (25 Nov 2020 11:07)      I&O's Summary    24 Nov 2020 07:01  -  25 Nov 2020 07:00  --------------------------------------------------------  IN: 2563.7 mL / OUT: 837 mL / NET: 1726.7 mL    25 Nov 2020 07:01  -  25 Nov 2020 11:57  --------------------------------------------------------  IN: 312.2 mL / OUT: 170 mL / NET: 142.2 mL        Physical Exam:   Gen:  Neuro:  HEENT:  Resp:  CVS:  Abd:  Ext:  Skin:    Meds:    norepinephrine Infusion IV Continuous    dexAMETHasone  Injectable IV Push  insulin glargine Injectable (LANTUS) SubCutaneous  insulin regular Infusion IV Continuous      cisatracurium Infusion IV Continuous  fentaNYL   Infusion... IV Continuous  midazolam Infusion IV Continuous  propofol Infusion IV Continuous      heparin  Infusion IV Continuous    pantoprazole  Injectable IV Push      albumin human 25% IVPB IV Intermittent  sodium chloride 0.9% lock flush IV Push PRN      chlorhexidine 0.12% Liquid Oral Mucosa  chlorhexidine 4% Liquid Topical                              12.8   16.29 )-----------( 234      ( 25 Nov 2020 00:34 )             41.7       11-25    146<H>  |  106  |  40<H>  ----------------------------<  131<H>  4.3   |  27  |  2.18<H>    Ca    8.7      25 Nov 2020 00:46  Phos  4.4     11-25  Mg     2.1     11-25            PT/INR - ( 25 Nov 2020 00:45 )   PT: 12.9 sec;   INR: 1.08 ratio         PTT - ( 25 Nov 2020 08:53 )  PTT:66.7 sec    .Sputum --   Rare polymorphonuclear leukocytes per low power field  No Squamous epithelial cells per low power field  Moderate Gram positive cocci in pairs seen per oil power field 11-25 @ 00:27  .Sputum Sputum --   No polymorphonuclear leukocytes per low power field  No Squamous epithelial cells per low power field  Few Gram positive cocci in pairs per oil power field  Few Gram Positive Rods per oil power field 11-24 @ 14:28              Radiology: ***  Bedside ultrasound: ***    CENTRAL LINE: N/Y          DATE INSERTED:              REMOVE: Y/N  SOUSA: N/Y                       DATE INSERTED:              REMOVE: Y/N  A-LINE: N/Y                       DATE INSERTED:              REMOVE: Y/N    GLOBAL ISSUE/BEST PRACTICE:  Analgesia:  Sedation:  CAM-ICU:   \[facility] [place] [facility]\"" elevation: yes  Stress ulcer prophylaxis:  VTE prophylaxis:  Glycemic control:  Nutrition:    CODE STATUS: ***    CRITICAL CARE TIME SPENT:  (Assessing presenting problems of acute illness, which pose high probability of life threatening deterioration or end organ damage/dysfunction, as well as medical decision making including initiating plan of care, reviewing data, reviewing radiologic exams, discussing with multidisciplinary team,  discussing goals of care with patient/family, and writing this note.  Non-inclusive of procedures performed)     HPI: 59 y/o AARON, htn, DVT, PE, asthma? admitted to [facility] for cough, SOB, diarrhea found to have covid19, hospital course c/b Acute Hypoxic resp failure d/t to covid19 pna, intubated on 11/22/2020 and transfer to MICU.     24 hour events: Today pt o2 requirement     Review of Systems:  Constitutional: No fever, chills, fatigue  Neuro: No headache, numbness, weakness  Resp: No cough, wheezing, shortness of breath  CVS: No chest pain, palpitations, leg swelling  GI: No abdominal pain, nausea, vomiting, diarrhea   : No dysuria, frequency, incontinence  Skin: No itching, burning, rashes, or lesions   Msk: No joint pain or swelling  Psych: No depression, anxiety, mood swings    T(F): 97.2 (11-25-20 @ 07:00), Max: 109.4 (11-24-20 @ 22:15)  HR: 52 (11-25-20 @ 11:35) (38 - 83)  BP: --  RR: 28 (11-25-20 @ 10:15) (28 - 33)  SpO2: 99% (11-25-20 @ 11:35) (92% - 100%)  Wt(kg): --    Mode: AC/ CMV (Assist Control/ Continuous Mandatory Ventilation), RR (machine): 28, TV (machine): 350, FiO2: 80, PEEP: 18  11-24-20 @ 07:01  -  11-25-20 @ 07:00  --------------------------------------------------------  IN: 2563.7 mL / OUT: 837 mL / NET: 1726.7 mL    11-25-20 @ 07:01  -  11-25-20 @ 11:57  --------------------------------------------------------  IN: 312.2 mL / OUT: 170 mL / NET: 142.2 mL        CAPILLARY BLOOD GLUCOSE      POCT Blood Glucose.: 178 mg/dL (25 Nov 2020 11:07)      I&O's Summary    24 Nov 2020 07:01  -  25 Nov 2020 07:00  --------------------------------------------------------  IN: 2563.7 mL / OUT: 837 mL / NET: 1726.7 mL    25 Nov 2020 07:01  -  25 Nov 2020 11:57  --------------------------------------------------------  IN: 312.2 mL / OUT: 170 mL / NET: 142.2 mL        Physical Exam:   Gen:  Neuro:  HEENT:  Resp:  CVS:  Abd:  Ext:  Skin:    Meds:    norepinephrine Infusion IV Continuous    dexAMETHasone  Injectable IV Push  insulin glargine Injectable (LANTUS) SubCutaneous  insulin regular Infusion IV Continuous      cisatracurium Infusion IV Continuous  fentaNYL   Infusion... IV Continuous  midazolam Infusion IV Continuous  propofol Infusion IV Continuous      heparin  Infusion IV Continuous    pantoprazole  Injectable IV Push      albumin human 25% IVPB IV Intermittent  sodium chloride 0.9% lock flush IV Push PRN      chlorhexidine 0.12% Liquid Oral Mucosa  chlorhexidine 4% Liquid Topical                              12.8   16.29 )-----------( 234      ( 25 Nov 2020 00:34 )             41.7       11-25    146<H>  |  106  |  40<H>  ----------------------------<  131<H>  4.3   |  27  |  2.18<H>    Ca    8.7      25 Nov 2020 00:46  Phos  4.4     11-25  Mg     2.1     11-25            PT/INR - ( 25 Nov 2020 00:45 )   PT: 12.9 sec;   INR: 1.08 ratio         PTT - ( 25 Nov 2020 08:53 )  PTT:66.7 sec    .Sputum --   Rare polymorphonuclear leukocytes per low power field  No Squamous epithelial cells per low power field  Moderate Gram positive cocci in pairs seen per oil power field 11-25 @ 00:27  .Sputum Sputum --   No polymorphonuclear leukocytes per low power field  No Squamous epithelial cells per low power field  Few Gram positive cocci in pairs per oil power field  Few Gram Positive Rods per oil power field 11-24 @ 14:28              Radiology: ***  Bedside ultrasound: ***    CENTRAL LINE: N/Y          DATE INSERTED:              REMOVE: Y/N  SOUSA: N/Y                       DATE INSERTED:              REMOVE: Y/N  A-LINE: N/Y                       DATE INSERTED:              REMOVE: Y/N    GLOBAL ISSUE/BEST PRACTICE:  Analgesia:  Sedation:  CAM-ICU:   HOB elevation: yes  Stress ulcer prophylaxis:  VTE prophylaxis:  Glycemic control:  Nutrition:    CODE STATUS: ***    CRITICAL CARE TIME SPENT:  (Assessing presenting problems of acute illness, which pose high probability of life threatening deterioration or end organ damage/dysfunction, as well as medical decision making including initiating plan of care, reviewing data, reviewing radiologic exams, discussing with multidisciplinary team,  discussing goals of care with patient/family, and writing this note.  Non-inclusive of procedures performed)     HPI: 61 y/o AARON, htn, DVT, PE, asthma? admitted to [facility] for cough, SOB, diarrhea found to have covid19, hospital course c/b Acute Hypoxic resp failure d/t to covid19 pna, intubated on 11/22/2020 and transfer to MICU.     24 hour events: overnight pt o2 requirement increased2/2 to water accmulation      vent setting titrated down waiting ABG, on insulin gtt was planned to be d/c after getting lantus 20U but became hyperglycemic thus continued insulin gtt w/ q1h FS,     Review of Systems:  Constitutional: No fever, chills, fatigue  Neuro: No headache, numbness, weakness  Resp: No cough, wheezing, shortness of breath  CVS: No chest pain, palpitations, leg swelling  GI: No abdominal pain, nausea, vomiting, diarrhea   : No dysuria, frequency, incontinence  Skin: No itching, burning, rashes, or lesions   Msk: No joint pain or swelling  Psych: No depression, anxiety, mood swings    T(F): 97.2 (11-25-20 @ 07:00), Max: 109.4 (11-24-20 @ 22:15)  HR: 52 (11-25-20 @ 11:35) (38 - 83)  BP: --  RR: 28 (11-25-20 @ 10:15) (28 - 33)  SpO2: 99% (11-25-20 @ 11:35) (92% - 100%)  Wt(kg): --    Mode: AC/ CMV (Assist Control/ Continuous Mandatory Ventilation), RR (machine): 28, TV (machine): 350, FiO2: 80, PEEP: 18  11-24-20 @ 07:01  -  11-25-20 @ 07:00  --------------------------------------------------------  IN: 2563.7 mL / OUT: 837 mL / NET: 1726.7 mL    11-25-20 @ 07:01  -  11-25-20 @ 11:57  --------------------------------------------------------  IN: 312.2 mL / OUT: 170 mL / NET: 142.2 mL        CAPILLARY BLOOD GLUCOSE      POCT Blood Glucose.: 178 mg/dL (25 Nov 2020 11:07)      I&O's Summary    24 Nov 2020 07:01  -  25 Nov 2020 07:00  --------------------------------------------------------  IN: 2563.7 mL / OUT: 837 mL / NET: 1726.7 mL    25 Nov 2020 07:01  -  25 Nov 2020 11:57  --------------------------------------------------------  IN: 312.2 mL / OUT: 170 mL / NET: 142.2 mL        Physical Exam:   Gen:  Neuro:  HEENT:  Resp:  CVS:  Abd:  Ext:  Skin:    Meds:    norepinephrine Infusion IV Continuous    dexAMETHasone  Injectable IV Push  insulin glargine Injectable (LANTUS) SubCutaneous  insulin regular Infusion IV Continuous      cisatracurium Infusion IV Continuous  fentaNYL   Infusion... IV Continuous  midazolam Infusion IV Continuous  propofol Infusion IV Continuous      heparin  Infusion IV Continuous    pantoprazole  Injectable IV Push      albumin human 25% IVPB IV Intermittent  sodium chloride 0.9% lock flush IV Push PRN      chlorhexidine 0.12% Liquid Oral Mucosa  chlorhexidine 4% Liquid Topical                              12.8   16.29 )-----------( 234      ( 25 Nov 2020 00:34 )             41.7       11-25    146<H>  |  106  |  40<H>  ----------------------------<  131<H>  4.3   |  27  |  2.18<H>    Ca    8.7      25 Nov 2020 00:46  Phos  4.4     11-25  Mg     2.1     11-25            PT/INR - ( 25 Nov 2020 00:45 )   PT: 12.9 sec;   INR: 1.08 ratio         PTT - ( 25 Nov 2020 08:53 )  PTT:66.7 sec    .Sputum --   Rare polymorphonuclear leukocytes per low power field  No Squamous epithelial cells per low power field  Moderate Gram positive cocci in pairs seen per oil power field 11-25 @ 00:27  .Sputum Sputum --   No polymorphonuclear leukocytes per low power field  No Squamous epithelial cells per low power field  Few Gram positive cocci in pairs per oil power field  Few Gram Positive Rods per oil power field 11-24 @ 14:28              Radiology: ***  Bedside ultrasound: ***    CENTRAL LINE: N/Y          DATE INSERTED:              REMOVE: Y/N  SOUSA: N/Y                       DATE INSERTED:              REMOVE: Y/N  A-LINE: N/Y                       DATE INSERTED:              REMOVE: Y/N    GLOBAL ISSUE/BEST PRACTICE:  Analgesia:  Sedation:  CAM-ICU:   [facility] elevation: yes  Stress ulcer prophylaxis:  VTE prophylaxis:  Glycemic control:  Nutrition:    CODE STATUS: ***    CRITICAL CARE TIME SPENT:  (Assessing presenting problems of acute illness, which pose high probability of life threatening deterioration or end organ damage/dysfunction, as well as medical decision making including initiating plan of care, reviewing data, reviewing radiologic exams, discussing with multidisciplinary team,  discussing goals of care with patient/family, and writing this note.  Non-inclusive of procedures performed)     HPI: 59 y/o AARON, htn, DVT, PE, asthma? admitted to [facility] for cough, SOB, diarrhea found to have covid19, hospital course c/b Acute Hypoxic resp failure d/t to covid19 pna, intubated on 11/22/2020 and transfer to MICU.     24 hour events: overnight pt o2 requirement increased 2/2 desaturation 2/2 to water accumulation within ETT, vent setting titrated down waiting ABG, on insulin gtt was planned to be d/c after getting Lantus 20U but became hyperglycemic thus continued insulin gtt w/ q1h FS     Review of Systems: Unable to obtain pt intubated and sedated    T(F): 97.2 (11-25-20 @ 07:00), Max: 109.4 (11-24-20 @ 22:15)  HR: 52 (11-25-20 @ 11:35) (38 - 83)  BP: --  RR: 28 (11-25-20 @ 10:15) (28 - 33)  SpO2: 99% (11-25-20 @ 11:35) (92% - 100%)  Wt(kg): --    Mode: AC/ CMV (Assist Control/ Continuous Mandatory Ventilation), RR (machine): 28, TV (machine): 350, FiO2: 80, PEEP: 18  11-24-20 @ 07:01  -  11-25-20 @ 07:00  --------------------------------------------------------  IN: 2563.7 mL / OUT: 837 mL / NET: 1726.7 mL    11-25-20 @ 07:01  -  11-25-20 @ 11:57  --------------------------------------------------------  IN: 312.2 mL / OUT: 170 mL / NET: 142.2 mL        CAPILLARY BLOOD GLUCOSE      POCT Blood Glucose.: 178 mg/dL (25 Nov 2020 11:07)      I&O's Summary    24 Nov 2020 07:01 - 25 Nov 2020 07:00  --------------------------------------------------------  IN: 2563.7 mL / OUT: 837 mL / NET: 1726.7 mL    25 Nov 2020 07:01  -  25 Nov 2020 11:57  --------------------------------------------------------  IN: 312.2 mL / OUT: 170 mL / NET: 142.2 mL        Physical Exam:   Gen: Comfortable in bed in NAD  Neuro: sedated and intubated  Resp: good air entry b/l  CVS: +RRR  Abd: BSx4, soft, ND  Ext: +2 edema   Skin: warm/dry    Meds:    norepinephrine Infusion IV Continuous    dexAMETHasone  Injectable IV Push  insulin glargine Injectable (LANTUS) SubCutaneous  insulin regular Infusion IV Continuous      cisatracurium Infusion IV Continuous  fentaNYL   Infusion... IV Continuous  midazolam Infusion IV Continuous  propofol Infusion IV Continuous      heparin  Infusion IV Continuous    pantoprazole  Injectable IV Push      albumin human 25% IVPB IV Intermittent  sodium chloride 0.9% lock flush IV Push PRN      chlorhexidine 0.12% Liquid Oral Mucosa  chlorhexidine 4% Liquid Topical                              12.8   16.29 )-----------( 234      ( 25 Nov 2020 00:34 )             41.7       11-25    146<H>  |  106  |  40<H>  ----------------------------<  131<H>  4.3   |  27  |  2.18<H>    Ca    8.7      25 Nov 2020 00:46  Phos  4.4     11-25  Mg     2.1     11-25            PT/INR - ( 25 Nov 2020 00:45 )   PT: 12.9 sec;   INR: 1.08 ratio         PTT - ( 25 Nov 2020 08:53 )  PTT:66.7 sec    .Sputum --   Rare polymorphonuclear leukocytes per low power field  No Squamous epithelial cells per low power field  Moderate Gram positive cocci in pairs seen per oil power field 11-25 @ 00:27  .Sputum Sputum --   No polymorphonuclear leukocytes per low power field  No Squamous epithelial cells per low power field  Few Gram positive cocci in pairs per oil power field  Few Gram Positive Rods per oil power field 11-24 @ 14:28              Radiology:     < from: Xray Chest 1 View- PORTABLE-Urgent (Xray Chest 1 View- PORTABLE-Urgent .) (11.23.20 @ 07:30) >  EXAM:  XR CHEST PORTABLE URGENT 1V                            PROCEDURE DATE:  11/23/2020      INTERPRETATION:  A single chest x-ray was obtained on November 23, 2020.    Indication: Position of endotracheal tube.    Impression:    The heart is enlarged. Pulmonary edema. Superimposed infection cannot be ruled out. Endotracheal tube and NG tube are in good position. No pleural effusion. No pneumothorax.        EHSAN VALVERDE MD; Attending Radiologist  This document has been electronically signed. Nov 23 2020 11:18AM    < end of copied text >      CENTRAL LINE: N  SOUSA: N  A-LINE: N      CODE STATUS: FULL CODE    CRITICAL CARE TIME SPENT: 30 mins  (Assessing presenting problems of acute illness, which pose high probability of life threatening deterioration or end organ damage/dysfunction, as well as medical decision making including initiating plan of care, reviewing data, reviewing radiologic exams, discussing with multidisciplinary team,  discussing goals of care with patient/family, and writing this note.  Non-inclusive of procedures performed)

## 2020-11-25 NOTE — CHART NOTE - NSCHARTNOTEFT_GEN_A_CORE
Nutrition Follow Up Note   Patient seen for: new admission to COVID ICU     Source: medical record, communication with team. Unable to speak to pt due to current airborne isolation contact precautions related to COVID-19. Pt remains intubated.     Chart reviewed, events noted. 60F w/ pmhx morbid obesity, AARON, HTN, DVT&PE in the past, ?Asthma now p/w a 9 day hx of cough/SOB, f adm with sepsis 2/2 acute hypoxic respiratory failure d/t COVID+ pneumonia, worsening, and intubated 11/22/20 for worsening respiratory failure and brought to the ICU.   - Remains sedated on propofol and fentanyl, also continues on nimbex.     Diet Order: Diet, NPO with Tube Feed:   Tube Feeding Modality: Orogastric  Glucerna 1.2 Pelon (GLUCERNARTH)  Total Volume for 24 Hours (mL): 240  Continuous  Starting Tube Feed Rate {mL per Hour}: 5  Increase Tube Feed Rate by (mL): 5     Every 2 hours  Until Goal Tube Feed Rate (mL per Hour): 10  Tube Feed Duration (in Hours): 24  Tube Feed Start Time: 15:00 (11-23-20 @ 14:48)    CURRENT EN ORDER PROVIDES: 288kcal and 14.4g protein  - EN Provision:    11/24: 240mL, 100% goal volume.     11/23: trickle feeds initiated    Nutrition- Related Events:   - Propofol: currently running at 16.3mL/hr. If to continue at this rate, propofol will provide 430kcal over the next 24 hours.  - Pt proned 11/23 @ 12 pm for 18 hours, placed supine at 6am on 11/24. Trickle feeds initiated while prone.   - Insulin GTT initated 2/2 hyperglycemia, now BG well-controlled.     -Noted pt with new T2DM and on dexamethasone  - ARTEMIO- Cr rising 2.18<H>, K+ and P WNL.   - Na 146, continue to monitor trend.    GI: + BM 11/22, not currently ordered for bowel regimen.     Anthropometric Measurements:   Height (cm): 152.4 (11-17-20 @ 20:39)  Weight (kg): 136.1 (11-17-20 @ 20:39)  BMI (kg/m2): 58.6 (11-17-20 @ 20:39)    Weights this admission: 119kg (11/23)  -Will continue to monitor weights, as bed scale weight differs drastically from dosing wt of 136.1kg. Noted pt spoke to prior RD and reported 25 pound wt gain since March. Weight in 2017 was 265.2 pounds.     Medications: MEDICATIONS  (STANDING):  chlorhexidine 0.12% Liquid 15 milliLiter(s) Oral Mucosa every 12 hours  chlorhexidine 4% Liquid 1 Application(s) Topical <User Schedule>  cisatracurium Infusion 3 MICROgram(s)/kG/Min (24.5 mL/Hr) IV Continuous <Continuous>  dexAMETHasone  Injectable 6 milliGRAM(s) IV Push daily  fentaNYL   Infusion... 0.5 MICROgram(s)/kG/Hr (3.4 mL/Hr) IV Continuous <Continuous>  heparin  Infusion 1500 Unit(s)/Hr (15 mL/Hr) IV Continuous <Continuous>  insulin regular Infusion 3 Unit(s)/Hr (3 mL/Hr) IV Continuous <Continuous>  midazolam Infusion 0.02 mG/kG/Hr (2.72 mL/Hr) IV Continuous <Continuous>  norepinephrine Infusion 0.05 MICROgram(s)/kG/Min (12.8 mL/Hr) IV Continuous <Continuous>  pantoprazole  Injectable 40 milliGRAM(s) IV Push daily  propofol Infusion 50 MICROgram(s)/kG/Min (40.8 mL/Hr) IV Continuous <Continuous>    MEDICATIONS  (PRN):  sodium chloride 0.9% lock flush 10 milliLiter(s) IV Push every 1 hour PRN Pre/post blood products, medications, blood draw, and to maintain line patency    Labs: 11-25 @ 04:00: Sodium --, Potassium --, Calcium --, Magnesium --, Phosphorus --, BUN --, Creatinine --, Glucose --, Alk Phos --, ALT/SGPT --, AST/SGOT --, Albumin --, Prealbumin --, Total Bilirubin --, Hemoglobin --, Hematocrit --, Ferritin 568<H>, C-Reactive Protein --, Creatine Kinase <<27>  11-25 @ 00:46: Sodium 146<H>, Potassium 4.3, Calcium 8.7, Magnesium 2.1, Phosphorus 4.4, BUN 40<H>, Creatinine 2.18<H>, Glucose 131<H>, Alk Phos --, ALT/SGPT --, AST/SGOT --, Albumin --, Prealbumin --, Total Bilirubin --, Hemoglobin --, Hematocrit --, Ferritin --, C-Reactive Protein 19.29<H>, Creatine Kinase <<27>  11-25 @ 00:34: Sodium --, Potassium --, Calcium --, Magnesium --, Phosphorus --, BUN --, Creatinine --, Glucose --, Alk Phos --, ALT/SGPT --, AST/SGOT --, Albumin --, Prealbumin --, Total Bilirubin --, Hemoglobin 12.8, Hematocrit 41.7, Ferritin --, C-Reactive Protein --, Creatine Kinase <<27>      Triglycerides, Serum: 166 mg/dL (11-25-20 @ 00:46)    POCT Blood Glucose.: 148 mg/dL (11-25-20 @ 07:56)  POCT Blood Glucose.: 134 mg/dL (11-25-20 @ 07:08)  POCT Blood Glucose.: 138 mg/dL (11-25-20 @ 05:54)  POCT Blood Glucose.: 127 mg/dL (11-25-20 @ 05:02)  POCT Blood Glucose.: 124 mg/dL (11-25-20 @ 04:06)  POCT Blood Glucose.: 124 mg/dL (11-25-20 @ 03:02)      Skin: no pressure injuries  Edema: 1+ generalized, 2+ L/R hand     Estimated Needs: Recalculated 2/2 intubation.   -IBW 45.45kg used for energy and protein needs in setting of BIM >50  Energy (22-25 kcal/kg): 1639-2467 kcal  Protein: (2.0-2.5 g/kg): 91-114g protein  [place] State Equation (REE): 1791 kcal    Previous Nutrition Diagnosis: Overweight/obesity, food and nutrition related knowledge deficit  Nutrition Diagnosis is:  ongoing, was previously addressed with nutrition education.     New Nutrition Diagnosis:  Inadequate protein-energy intake related to intubation and prone position requiring trickle feed rate, as evidenced by failure to meet estimated nutrient needs x2 days.    Recommended Interventions:   1. When medically feasible change formula to Vital AF. Goal rate is 20mL/hr x 24 hours + 4 NoCarb Prosource (240kcal and 60g protein provided by supplement in total).  - Provides with Prosource: 480mL formula, 816kcal, 96g protein and 386mL free water.  - Propofol expected to give 430kcal. With propofol, pt will receive: 1246kcal and 96g protein (27 kcal/kg and 2.0g protein/kg of IBW 45.45kg).   3. Monitor tolerance to feeds, add Bowel regimen if lack of BM continues.  4. Free water flushes deferred to team discretion.     Monitoring and Evaluation:   Continue to monitor nutrition provision and tolerance, weights, labs, skin integrity.   RD remains available upon request and will follow up per protocol.    Selina Mclean RD, CDN Pager: [phone] Nutrition Follow Up Note   Patient seen for: new admission to COVID ICU     Source: medical record, communication with team. Unable to speak to pt due to current airborne isolation contact precautions related to COVID-19. Pt remains intubated.     Chart reviewed, events noted. 60F w/ pmhx morbid obesity, AARON, HTN, DVT&PE in the past, ?Asthma now p/w a 9 day hx of cough/SOB, f adm with sepsis 2/2 acute hypoxic respiratory failure d/t COVID+ pneumonia, worsening, and intubated 11/22/20 for worsening respiratory failure and brought to the ICU.   - Remains sedated on propofol and fentanyl, also continues on nimbex.     Diet Order: Diet, NPO with Tube Feed:   Tube Feeding Modality: Orogastric  Glucerna 1.2 Pelon (GLUCERNARTH)  Total Volume for 24 Hours (mL): 240  Continuous  Starting Tube Feed Rate {mL per Hour}: 5  Increase Tube Feed Rate by (mL): 5     Every 2 hours  Until Goal Tube Feed Rate (mL per Hour): 10  Tube Feed Duration (in Hours): 24  Tube Feed Start Time: 15:00 (11-23-20 @ 14:48)    CURRENT EN ORDER PROVIDES: 288kcal and 14.4g protein  - EN Provision:    11/25: running at goal rate currently     11/24: 240mL, 100% goal volume.     11/23: trickle feeds initiated    Nutrition- Related Events:   - Propofol: currently running at 16.3mL/hr. If to continue at this rate, propofol will provide 430kcal over the next 24 hours.  - Pt proned 11/23 @ 12 pm for 18 hours, placed supine at 6am on 11/24. Trickle feeds initiated while prone.   - Insulin GTT initated 2/2 hyperglycemia, now BG well-controlled.     -Noted pt with new T2DM and on dexamethasone  - ARTEMIO- Cr rising 2.18<H>, K+ and P WNL.   - Na 146, continue to monitor trend.    GI: + BM 11/22, not currently ordered for bowel regimen.     Anthropometric Measurements:   Height (cm): 152.4 (11-17-20 @ 20:39)  Weight (kg): 136.1 (11-17-20 @ 20:39)  BMI (kg/m2): 58.6 (11-17-20 @ 20:39)    Weights this admission: 119kg (11/23)  -Will continue to monitor weights, as bed scale weight differs drastically from dosing wt of 136.1kg. Noted pt spoke to prior RD and reported 25 pound wt gain since March. Weight in 2017 was 265.2 pounds.     Medications: MEDICATIONS  (STANDING):  chlorhexidine 0.12% Liquid 15 milliLiter(s) Oral Mucosa every 12 hours  chlorhexidine 4% Liquid 1 Application(s) Topical <User Schedule>  cisatracurium Infusion 3 MICROgram(s)/kG/Min (24.5 mL/Hr) IV Continuous <Continuous>  dexAMETHasone  Injectable 6 milliGRAM(s) IV Push daily  fentaNYL   Infusion... 0.5 MICROgram(s)/kG/Hr (3.4 mL/Hr) IV Continuous <Continuous>  heparin  Infusion 1500 Unit(s)/Hr (15 mL/Hr) IV Continuous <Continuous>  insulin regular Infusion 3 Unit(s)/Hr (3 mL/Hr) IV Continuous <Continuous>  midazolam Infusion 0.02 mG/kG/Hr (2.72 mL/Hr) IV Continuous <Continuous>  norepinephrine Infusion 0.05 MICROgram(s)/kG/Min (12.8 mL/Hr) IV Continuous <Continuous>  pantoprazole  Injectable 40 milliGRAM(s) IV Push daily  propofol Infusion 50 MICROgram(s)/kG/Min (40.8 mL/Hr) IV Continuous <Continuous>    MEDICATIONS  (PRN):  sodium chloride 0.9% lock flush 10 milliLiter(s) IV Push every 1 hour PRN Pre/post blood products, medications, blood draw, and to maintain line patency    Labs: 11-25 @ 04:00: Sodium --, Potassium --, Calcium --, Magnesium --, Phosphorus --, BUN --, Creatinine --, Glucose --, Alk Phos --, ALT/SGPT --, AST/SGOT --, Albumin --, Prealbumin --, Total Bilirubin --, Hemoglobin --, Hematocrit --, Ferritin 568<H>, C-Reactive Protein --, Creatine Kinase <<27>  11-25 @ 00:46: Sodium 146<H>, Potassium 4.3, Calcium 8.7, Magnesium 2.1, Phosphorus 4.4, BUN 40<H>, Creatinine 2.18<H>, Glucose 131<H>, Alk Phos --, ALT/SGPT --, AST/SGOT --, Albumin --, Prealbumin --, Total Bilirubin --, Hemoglobin --, Hematocrit --, Ferritin --, C-Reactive Protein 19.29<H>, Creatine Kinase <<27>  11-25 @ 00:34: Sodium --, Potassium --, Calcium --, Magnesium --, Phosphorus --, BUN --, Creatinine --, Glucose --, Alk Phos --, ALT/SGPT --, AST/SGOT --, Albumin --, Prealbumin --, Total Bilirubin --, Hemoglobin 12.8, Hematocrit 41.7, Ferritin --, C-Reactive Protein --, Creatine Kinase <<27>      Triglycerides, Serum: 166 mg/dL (11-25-20 @ 00:46)    POCT Blood Glucose.: 148 mg/dL (11-25-20 @ 07:56)  POCT Blood Glucose.: 134 mg/dL (11-25-20 @ 07:08)  POCT Blood Glucose.: 138 mg/dL (11-25-20 @ 05:54)  POCT Blood Glucose.: 127 mg/dL (11-25-20 @ 05:02)  POCT Blood Glucose.: 124 mg/dL (11-25-20 @ 04:06)  POCT Blood Glucose.: 124 mg/dL (11-25-20 @ 03:02)      Skin: no pressure injuries  Edema: 1+ generalized, 2+ L/R hand     Estimated Needs: Recalculated 2/2 intubation.   -IBW 45.45kg used for energy and protein needs in setting of BIM >50  Energy (22-25 kcal/kg): 9270-9863 kcal  Protein: (2.0-2.5 g/kg): 91-114g protein  [place] State Equation (REE): 1791 kcal    Previous Nutrition Diagnosis: Overweight/obesity, food and nutrition related knowledge deficit  Nutrition Diagnosis is:  ongoing, was previously addressed with nutrition education.     New Nutrition Diagnosis:  Inadequate protein-energy intake related to intubation and prone position requiring trickle feed rate, as evidenced by failure to meet estimated nutrient needs x2 days.    Recommended Interventions:   1. When medically feasible change formula to Vital AF. Goal rate is 20mL/hr x 24 hours + 4 NoCarb Prosource (240kcal and 60g protein provided by supplement in total).  - Provides with Prosource: 480mL formula, 816kcal, 96g protein and 386mL free water.  - Propofol expected to give 430kcal. With propofol, pt will receive: 1246kcal and 96g protein (27 kcal/kg and 2.0g protein/kg of IBW 45.45kg).   3. Monitor tolerance to feeds, add Bowel regimen if lack of BM continues.  4. Free water flushes deferred to team discretion.     Monitoring and Evaluation:   Continue to monitor nutrition provision and tolerance, weights, labs, skin integrity.   RD remains available upon request and will follow up per protocol.    Selina Mclean RD, CDN Pager: [phone]

## 2020-11-25 NOTE — PROGRESS NOTE ADULT - ASSESSMENT
61 y/o AARON, htn, DVT, PE, asthma? admitted to [facility] for cough, SOB, diarrhea found to have covid19, hospital course c/b Acute Hypoxic resp failure d/t to covid19 pna, intubated on 11/22/2020 and transfer to MICU.      -Neuro: intubated and sedated cont prop/fent/versed and number for vent synchrony, pronging if needed can hold off for now   -Cardiac: Distributive shock cont levo to maintain MAP >65 titrated as tolerated, can consider adding midodrine  -Resp: Acute hypoxic resp failure 2/2 to covid19 cont PRBC vent setting and titrated to maintain o2 >88%, cont pulm toliet  -GI: NPO, cont TF as ordered/tolerated, GI ppx w/ protonix  -Renal: ARTEMIO cont to monitor I&O closely, will add albumin q12h x2 doses, cont to trend and replete lytes prn  -ID: Covid19 now s/p remdesivir, cont dexa, off IVAB  -Heme: Cont full dose AC given high risk for DVT/PE, w/in parameters  -Endo: DM2 a1c 7.5 w/ persistent hyperglycemia given 20U Lantus earlier this morning w/ attempts to wean off insulin gtt but became hyperglycemic again cont insulin gtt w/ q1h FS   -Dispo: pt critically ill, remains in MICU, [facility] ongoing pt is full code, prognosis guarded, pt seen and d/w dr. fontaine

## 2020-11-26 DIAGNOSIS — N17.9 ACUTE KIDNEY FAILURE, UNSPECIFIED: ICD-10-CM

## 2020-11-26 LAB
-  AMPICILLIN/SULBACTAM: SIGNIFICANT CHANGE UP
-  AMPICILLIN/SULBACTAM: SIGNIFICANT CHANGE UP
-  CEFAZOLIN: SIGNIFICANT CHANGE UP
-  CEFAZOLIN: SIGNIFICANT CHANGE UP
-  CLINDAMYCIN: SIGNIFICANT CHANGE UP
-  CLINDAMYCIN: SIGNIFICANT CHANGE UP
-  ERYTHROMYCIN: SIGNIFICANT CHANGE UP
-  ERYTHROMYCIN: SIGNIFICANT CHANGE UP
-  GENTAMICIN: SIGNIFICANT CHANGE UP
-  GENTAMICIN: SIGNIFICANT CHANGE UP
-  OXACILLIN: SIGNIFICANT CHANGE UP
-  OXACILLIN: SIGNIFICANT CHANGE UP
-  PENICILLIN: SIGNIFICANT CHANGE UP
-  PENICILLIN: SIGNIFICANT CHANGE UP
-  RIFAMPIN: SIGNIFICANT CHANGE UP
-  RIFAMPIN: SIGNIFICANT CHANGE UP
-  TETRACYCLINE: SIGNIFICANT CHANGE UP
-  TETRACYCLINE: SIGNIFICANT CHANGE UP
-  TRIMETHOPRIM/SULFAMETHOXAZOLE: SIGNIFICANT CHANGE UP
-  TRIMETHOPRIM/SULFAMETHOXAZOLE: SIGNIFICANT CHANGE UP
-  VANCOMYCIN: SIGNIFICANT CHANGE UP
-  VANCOMYCIN: SIGNIFICANT CHANGE UP
ANION GAP SERPL CALC-SCNC: 16 MMOL/L — SIGNIFICANT CHANGE UP (ref 5–17)
APTT BLD: 90.6 SEC — HIGH (ref 27.5–35.5)
BUN SERPL-MCNC: 47 MG/DL — HIGH (ref 7–23)
CALCIUM SERPL-MCNC: 8.9 MG/DL — SIGNIFICANT CHANGE UP (ref 8.4–10.5)
CHLORIDE SERPL-SCNC: 106 MMOL/L — SIGNIFICANT CHANGE UP (ref 96–108)
CO2 SERPL-SCNC: 23 MMOL/L — SIGNIFICANT CHANGE UP (ref 22–31)
CREAT SERPL-MCNC: 2.57 MG/DL — HIGH (ref 0.5–1.3)
CRP SERPL-MCNC: 23.09 MG/DL — HIGH (ref 0–0.4)
CULTURE RESULTS: SIGNIFICANT CHANGE UP
CULTURE RESULTS: SIGNIFICANT CHANGE UP
D DIMER BLD IA.RAPID-MCNC: 667 NG/ML DDU — HIGH
FERRITIN SERPL-MCNC: 579 NG/ML — HIGH (ref 15–150)
GAS PNL BLDA: SIGNIFICANT CHANGE UP
GLUCOSE BLDC GLUCOMTR-MCNC: 106 MG/DL — HIGH (ref 70–99)
GLUCOSE BLDC GLUCOMTR-MCNC: 109 MG/DL — HIGH (ref 70–99)
GLUCOSE BLDC GLUCOMTR-MCNC: 109 MG/DL — HIGH (ref 70–99)
GLUCOSE BLDC GLUCOMTR-MCNC: 114 MG/DL — HIGH (ref 70–99)
GLUCOSE BLDC GLUCOMTR-MCNC: 125 MG/DL — HIGH (ref 70–99)
GLUCOSE BLDC GLUCOMTR-MCNC: 125 MG/DL — HIGH (ref 70–99)
GLUCOSE BLDC GLUCOMTR-MCNC: 126 MG/DL — HIGH (ref 70–99)
GLUCOSE BLDC GLUCOMTR-MCNC: 132 MG/DL — HIGH (ref 70–99)
GLUCOSE BLDC GLUCOMTR-MCNC: 137 MG/DL — HIGH (ref 70–99)
GLUCOSE BLDC GLUCOMTR-MCNC: 143 MG/DL — HIGH (ref 70–99)
GLUCOSE BLDC GLUCOMTR-MCNC: 147 MG/DL — HIGH (ref 70–99)
GLUCOSE BLDC GLUCOMTR-MCNC: 147 MG/DL — HIGH (ref 70–99)
GLUCOSE BLDC GLUCOMTR-MCNC: 148 MG/DL — HIGH (ref 70–99)
GLUCOSE BLDC GLUCOMTR-MCNC: 154 MG/DL — HIGH (ref 70–99)
GLUCOSE BLDC GLUCOMTR-MCNC: 155 MG/DL — HIGH (ref 70–99)
GLUCOSE BLDC GLUCOMTR-MCNC: 158 MG/DL — HIGH (ref 70–99)
GLUCOSE BLDC GLUCOMTR-MCNC: 160 MG/DL — HIGH (ref 70–99)
GLUCOSE BLDC GLUCOMTR-MCNC: 162 MG/DL — HIGH (ref 70–99)
GLUCOSE BLDC GLUCOMTR-MCNC: 165 MG/DL — HIGH (ref 70–99)
GLUCOSE BLDC GLUCOMTR-MCNC: 169 MG/DL — HIGH (ref 70–99)
GLUCOSE BLDC GLUCOMTR-MCNC: 170 MG/DL — HIGH (ref 70–99)
GLUCOSE BLDC GLUCOMTR-MCNC: 170 MG/DL — HIGH (ref 70–99)
GLUCOSE SERPL-MCNC: 120 MG/DL — HIGH (ref 70–99)
HCT VFR BLD CALC: 35.8 % — SIGNIFICANT CHANGE UP (ref 34.5–45)
HCT VFR BLD CALC: 38.7 % — SIGNIFICANT CHANGE UP (ref 34.5–45)
HGB BLD-MCNC: 11.1 G/DL — LOW (ref 11.5–15.5)
HGB BLD-MCNC: 12 G/DL — SIGNIFICANT CHANGE UP (ref 11.5–15.5)
INR BLD: 1.04 RATIO — SIGNIFICANT CHANGE UP (ref 0.88–1.16)
LDH SERPL L TO P-CCNC: 351 U/L — HIGH (ref 50–242)
MAGNESIUM SERPL-MCNC: 2.3 MG/DL — SIGNIFICANT CHANGE UP (ref 1.6–2.6)
MCHC RBC-ENTMCNC: 27.8 PG — SIGNIFICANT CHANGE UP (ref 27–34)
MCHC RBC-ENTMCNC: 28.1 PG — SIGNIFICANT CHANGE UP (ref 27–34)
MCHC RBC-ENTMCNC: 31 GM/DL — LOW (ref 32–36)
MCHC RBC-ENTMCNC: 31 GM/DL — LOW (ref 32–36)
MCV RBC AUTO: 89.8 FL — SIGNIFICANT CHANGE UP (ref 80–100)
MCV RBC AUTO: 90.6 FL — SIGNIFICANT CHANGE UP (ref 80–100)
METHOD TYPE: SIGNIFICANT CHANGE UP
METHOD TYPE: SIGNIFICANT CHANGE UP
NRBC # BLD: 0 /100 WBCS — SIGNIFICANT CHANGE UP (ref 0–0)
ORGANISM # SPEC MICROSCOPIC CNT: SIGNIFICANT CHANGE UP
PHOSPHATE SERPL-MCNC: 4.4 MG/DL — SIGNIFICANT CHANGE UP (ref 2.5–4.5)
PLATELET # BLD AUTO: 172 K/UL — SIGNIFICANT CHANGE UP (ref 150–400)
PLATELET # BLD AUTO: 219 K/UL — SIGNIFICANT CHANGE UP (ref 150–400)
POTASSIUM SERPL-MCNC: 4.2 MMOL/L — SIGNIFICANT CHANGE UP (ref 3.5–5.3)
POTASSIUM SERPL-SCNC: 4.2 MMOL/L — SIGNIFICANT CHANGE UP (ref 3.5–5.3)
PROTHROM AB SERPL-ACNC: 12.4 SEC — SIGNIFICANT CHANGE UP (ref 10.6–13.6)
RBC # BLD: 3.95 M/UL — SIGNIFICANT CHANGE UP (ref 3.8–5.2)
RBC # BLD: 4.31 M/UL — SIGNIFICANT CHANGE UP (ref 3.8–5.2)
RBC # FLD: 16.4 % — HIGH (ref 10.3–14.5)
RBC # FLD: 17.1 % — HIGH (ref 10.3–14.5)
SODIUM SERPL-SCNC: 145 MMOL/L — SIGNIFICANT CHANGE UP (ref 135–145)
SPECIMEN SOURCE: SIGNIFICANT CHANGE UP
SPECIMEN SOURCE: SIGNIFICANT CHANGE UP
WBC # BLD: 20.58 K/UL — HIGH (ref 3.8–10.5)
WBC # FLD AUTO: 20.58 K/UL — HIGH (ref 3.8–10.5)

## 2020-11-26 PROCEDURE — 93010 ELECTROCARDIOGRAM REPORT: CPT

## 2020-11-26 PROCEDURE — 99254 IP/OBS CNSLTJ NEW/EST MOD 60: CPT | Mod: GC

## 2020-11-26 PROCEDURE — 99291 CRITICAL CARE FIRST HOUR: CPT

## 2020-11-26 RX ORDER — METOCLOPRAMIDE HCL 10 MG
10 TABLET ORAL EVERY 6 HOURS
Refills: 0 | Status: DISCONTINUED | OUTPATIENT
Start: 2020-11-26 | End: 2020-12-05

## 2020-11-26 RX ORDER — METOCLOPRAMIDE HCL 10 MG
10 TABLET ORAL ONCE
Refills: 0 | Status: COMPLETED | OUTPATIENT
Start: 2020-11-26 | End: 2020-11-26

## 2020-11-26 RX ORDER — CEFTRIAXONE 500 MG/1
1000 INJECTION, POWDER, FOR SOLUTION INTRAMUSCULAR; INTRAVENOUS EVERY 24 HOURS
Refills: 0 | Status: DISCONTINUED | OUTPATIENT
Start: 2020-11-26 | End: 2020-11-28

## 2020-11-26 RX ORDER — ACETAMINOPHEN 500 MG
650 TABLET ORAL EVERY 6 HOURS
Refills: 0 | Status: ACTIVE | OUTPATIENT
Start: 2020-11-26 | End: 2021-10-25

## 2020-11-26 RX ORDER — ALBUMIN HUMAN 25 %
100 VIAL (ML) INTRAVENOUS EVERY 8 HOURS
Refills: 0 | Status: COMPLETED | OUTPATIENT
Start: 2020-11-26 | End: 2020-11-27

## 2020-11-26 RX ADMIN — Medication 650 MILLIGRAM(S): at 20:59

## 2020-11-26 RX ADMIN — Medication 650 MILLIGRAM(S): at 22:00

## 2020-11-26 RX ADMIN — CEFTRIAXONE 100 MILLIGRAM(S): 500 INJECTION, POWDER, FOR SOLUTION INTRAMUSCULAR; INTRAVENOUS at 21:00

## 2020-11-26 RX ADMIN — MIDAZOLAM HYDROCHLORIDE 2.72 MG/KG/HR: 1 INJECTION, SOLUTION INTRAMUSCULAR; INTRAVENOUS at 13:02

## 2020-11-26 RX ADMIN — PROPOFOL 40.8 MICROGRAM(S)/KG/MIN: 10 INJECTION, EMULSION INTRAVENOUS at 10:21

## 2020-11-26 RX ADMIN — Medication 1 APPLICATION(S): at 17:19

## 2020-11-26 RX ADMIN — CHLORHEXIDINE GLUCONATE 1 APPLICATION(S): 213 SOLUTION TOPICAL at 06:03

## 2020-11-26 RX ADMIN — Medication 50 MILLILITER(S): at 06:03

## 2020-11-26 RX ADMIN — Medication 10 MILLIGRAM(S): at 13:02

## 2020-11-26 RX ADMIN — Medication 50 MILLILITER(S): at 16:10

## 2020-11-26 RX ADMIN — CHLORHEXIDINE GLUCONATE 15 MILLILITER(S): 213 SOLUTION TOPICAL at 17:18

## 2020-11-26 RX ADMIN — Medication 10 MILLIGRAM(S): at 17:18

## 2020-11-26 RX ADMIN — CHLORHEXIDINE GLUCONATE 15 MILLILITER(S): 213 SOLUTION TOPICAL at 06:04

## 2020-11-26 RX ADMIN — MIDAZOLAM HYDROCHLORIDE 2.72 MG/KG/HR: 1 INJECTION, SOLUTION INTRAMUSCULAR; INTRAVENOUS at 10:58

## 2020-11-26 RX ADMIN — PROPOFOL 40.8 MICROGRAM(S)/KG/MIN: 10 INJECTION, EMULSION INTRAVENOUS at 17:18

## 2020-11-26 RX ADMIN — Medication 6 MILLIGRAM(S): at 06:01

## 2020-11-26 RX ADMIN — Medication 10 MILLIGRAM(S): at 23:06

## 2020-11-26 RX ADMIN — PANTOPRAZOLE SODIUM 40 MILLIGRAM(S): 20 TABLET, DELAYED RELEASE ORAL at 12:42

## 2020-11-26 RX ADMIN — Medication 50 MILLILITER(S): at 23:06

## 2020-11-26 NOTE — PROGRESS NOTE ADULT - SUBJECTIVE AND OBJECTIVE BOX
Patient Discussed on Morning Rounds with      Primary Diagnosis: COVID Pneumonia    SUBJECTIVE:  60yFemale    Interval Events:    OBJECTIVE:  Vitals: ICU Vital Signs Last 24 Hrs  T(C): 36.6 (26 Nov 2020 00:00), Max: 36.6 (26 Nov 2020 00:00)  T(F): 97.9 (26 Nov 2020 00:00), Max: 97.9 (26 Nov 2020 00:00)  HR: 63 (26 Nov 2020 02:30) (38 - 66)  BP: --  BP(mean): --  ABP: 133/52 (26 Nov 2020 02:30) (114/46 - 158/59)  ABP(mean): 77 (26 Nov 2020 02:30) (64 - 88)  RR: 28 (26 Nov 2020 02:30) (28 - 28)  SpO2: 94% (26 Nov 2020 02:30) (92% - 100%)    I&O:  I&O's Detail    24 Nov 2020 07:01  -  25 Nov 2020 07:00  --------------------------------------------------------  IN:    Cisatracurium: 168 mL    Cisatracurium: 41 mL    FentaNYL: 445.4 mL    Glucerna: 240 mL    Heparin: 228 mL    Heparin: 72 mL    Insulin: 247.5 mL    Midazolam: 326.4 mL    Norepinephrine: 404.2 mL    Propofol: 391.2 mL  Total IN: 2563.7 mL    OUT:    Indwelling Catheter - Urethral (mL): 837 mL  Total OUT: 837 mL    Total NET: 1726.7 mL      25 Nov 2020 07:01  -  26 Nov 2020 03:14  --------------------------------------------------------  IN:    Cisatracurium: 155.8 mL    FentaNYL: 323 mL    Glucerna: 70 mL    Heparin: 285 mL    Insulin: 15 mL    Insulin: 149.5 mL    IV PiggyBack: 50 mL    Midazolam: 258.4 mL    Norepinephrine: 207.3 mL    Propofol: 309.7 mL    Vital1.0: 280 mL  Total IN: 2103.7 mL    OUT:    Indwelling Catheter - Urethral (mL): 865 mL  Total OUT: 865 mL    Total NET: 1238.7 mL        VENTILATOR SETTINGS:  Mode: AC/ CMV (Assist Control/ Continuous Mandatory Ventilation)  RR (machine): 28  TV (machine): 350  FiO2: 70  PEEP: 18  ITime: 1  MAP: 23  PIP: 41    ABG - ( 26 Nov 2020 00:36 )  pH, Arterial: 7.37  pH, Blood: x     /  pCO2: 52    /  pO2: 75    / HCO3: 30    / Base Excess: 3.6   /  SaO2: 94                  PHYSICAL EXAM: Physical Exam performed by Intensivist to minimize risk of exposure.  Please refer to Attending Attestation for comprehensive exam.   General: No acute distress  Neuro: [Sedated]  CV: RRR  Pulm: [INTUBATED]  : [[place]]  Psych: affect appropriate    LABS:                         12.0   20.58 )-----------( 219      ( 26 Nov 2020 00:18 )             38.7   11-26    145  |  106  |  47<H>  ----------------------------<  120<H>  4.2   |  23  |  2.57<H>    Ca    8.9      26 Nov 2020 00:55  Phos  4.4     11-26  Mg     2.3     11-26    PT/INR - ( 26 Nov 2020 00:18 )   PT: 12.4 sec;   INR: 1.04 ratio         PTT - ( 26 Nov 2020 00:18 )  PTT:90.6 sec  Ferritin, Serum: 579 ng/mL (11-26-20 @ 02:37)  D-Dimer Assay, Quantitative: 667 ng/mL DDU (11-26-20 @ 00:18)  Ferritin, Serum: 568 ng/mL (11-25-20 @ 04:00)  ABG - ( 26 Nov 2020 00:36 )  pH, Arterial: 7.37  pH, Blood: x     /  pCO2: 52    /  pO2: 75    / HCO3: 30    / Base Excess: 3.6   /  SaO2: 94                CAPILLARY BLOOD GLUCOSE      POCT Blood Glucose.: 114 mg/dL (26 Nov 2020 02:55)  POCT Blood Glucose.: 109 mg/dL (26 Nov 2020 01:50)  POCT Blood Glucose.: 106 mg/dL (26 Nov 2020 00:55)  POCT Blood Glucose.: 105 mg/dL (25 Nov 2020 23:52)  POCT Blood Glucose.: 116 mg/dL (25 Nov 2020 22:50)  POCT Blood Glucose.: 132 mg/dL (25 Nov 2020 21:50)  POCT Blood Glucose.: 168 mg/dL (25 Nov 2020 20:53)  POCT Blood Glucose.: 163 mg/dL (25 Nov 2020 19:51)  POCT Blood Glucose.: 185 mg/dL (25 Nov 2020 18:49)  POCT Blood Glucose.: 201 mg/dL (25 Nov 2020 18:03)  POCT Blood Glucose.: 210 mg/dL (25 Nov 2020 17:01)  POCT Blood Glucose.: 199 mg/dL (25 Nov 2020 16:27)  POCT Blood Glucose.: 225 mg/dL (25 Nov 2020 14:46)  POCT Blood Glucose.: 212 mg/dL (25 Nov 2020 13:50)  POCT Blood Glucose.: 218 mg/dL (25 Nov 2020 13:08)  POCT Blood Glucose.: 208 mg/dL (25 Nov 2020 11:57)  POCT Blood Glucose.: 178 mg/dL (25 Nov 2020 11:07)  POCT Blood Glucose.: 140 mg/dL (25 Nov 2020 09:03)  POCT Blood Glucose.: 148 mg/dL (25 Nov 2020 07:56)  POCT Blood Glucose.: 134 mg/dL (25 Nov 2020 07:08)  POCT Blood Glucose.: 138 mg/dL (25 Nov 2020 05:54)  POCT Blood Glucose.: 127 mg/dL (25 Nov 2020 05:02)  POCT Blood Glucose.: 124 mg/dL (25 Nov 2020 04:06)     MEDICATIONS  (STANDING):  albumin human 25% IVPB 50 milliLiter(s) IV Intermittent every 12 hours  chlorhexidine 0.12% Liquid 15 milliLiter(s) Oral Mucosa every 12 hours  chlorhexidine 4% Liquid 1 Application(s) Topical <User Schedule>  cisatracurium Infusion 3 MICROgram(s)/kG/Min (24.5 mL/Hr) IV Continuous <Continuous>  dexAMETHasone  Injectable 6 milliGRAM(s) IV Push daily  fentaNYL   Infusion... 0.5 MICROgram(s)/kG/Hr (3.4 mL/Hr) IV Continuous <Continuous>  heparin  Infusion 1500 Unit(s)/Hr (15 mL/Hr) IV Continuous <Continuous>  insulin regular Infusion 5 Unit(s)/Hr (5 mL/Hr) IV Continuous <Continuous>  midazolam Infusion 0.02 mG/kG/Hr (2.72 mL/Hr) IV Continuous <Continuous>  norepinephrine Infusion 0.05 MICROgram(s)/kG/Min (12.8 mL/Hr) IV Continuous <Continuous>  pantoprazole  Injectable 40 milliGRAM(s) IV Push daily  propofol Infusion 50 MICROgram(s)/kG/Min (40.8 mL/Hr) IV Continuous <Continuous>    MEDICATIONS  (PRN):  sodium chloride 0.9% lock flush 10 milliLiter(s) IV Push every 1 hour PRN Pre/post blood products, medications, blood draw, and to maintain line patency      RADIOLOGY/OTHER STUDIES:       HPI: 61 y/o AARON, htn, DVT, PE, asthma? admitted to [facility] for cough, SOB, diarrhea found to have covid19, hospital course c/b Acute Hypoxic resp failure d/t to covid19 pna, intubated on 11/22/2020 and transfer to MICU.     24 hour events: Started yesterday but continued to require insulin gtt for glycemic control. Serum Cr up trending.     Review of Systems: Unable to obtain 2/2 sedation     OBJECTIVE:  Vitals: ICU Vital Signs Last 24 Hrs  T(C): 36.6 (26 Nov 2020 00:00), Max: 36.6 (26 Nov 2020 00:00)  T(F): 97.9 (26 Nov 2020 00:00), Max: 97.9 (26 Nov 2020 00:00)  HR: 63 (26 Nov 2020 02:30) (38 - 66)  BP: --  BP(mean): --  ABP: 133/52 (26 Nov 2020 02:30) (114/46 - 158/59)  ABP(mean): 77 (26 Nov 2020 02:30) (64 - 88)  RR: 28 (26 Nov 2020 02:30) (28 - 28)  SpO2: 94% (26 Nov 2020 02:30) (92% - 100%)    I&O:  I&O's Detail    24 Nov 2020 07:01  -  25 Nov 2020 07:00  --------------------------------------------------------  IN:    Cisatracurium: 168 mL    Cisatracurium: 41 mL    FentaNYL: 445.4 mL    Glucerna: 240 mL    Heparin: 228 mL    Heparin: 72 mL    Insulin: 247.5 mL    Midazolam: 326.4 mL    Norepinephrine: 404.2 mL    Propofol: 391.2 mL  Total IN: 2563.7 mL    OUT:    Indwelling Catheter - Urethral (mL): 837 mL  Total OUT: 837 mL    Total NET: 1726.7 mL      25 Nov 2020 07:01  -  26 Nov 2020 03:14  --------------------------------------------------------  IN:    Cisatracurium: 155.8 mL    FentaNYL: 323 mL    Glucerna: 70 mL    Heparin: 285 mL    Insulin: 15 mL    Insulin: 149.5 mL    IV PiggyBack: 50 mL    Midazolam: 258.4 mL    Norepinephrine: 207.3 mL    Propofol: 309.7 mL    Vital1.0: 280 mL  Total IN: 2103.7 mL    OUT:    Indwelling Catheter - Urethral (mL): 865 mL  Total OUT: 865 mL    Total NET: 1238.7 mL        VENTILATOR SETTINGS:  Mode: AC/ CMV (Assist Control/ Continuous Mandatory Ventilation)  RR (machine): 28  TV (machine): 350  FiO2: 70  PEEP: 18  ITime: 1  MAP: 23  PIP: 41    ABG - ( 26 Nov 2020 00:36 )  pH, Arterial: 7.37  pH, Blood: x     /  pCO2: 52    /  pO2: 75    / HCO3: 30    / Base Excess: 3.6   /  SaO2: 94                  PHYSICAL EXAM:   Gen: Comfortable in bed in NAD  Neuro: sedated and intubated  Resp: good air entry b/l  CVS: +RRR  Abd: BSx4, soft, ND  Ext: +2 edema   Skin: warm/dry    LABS:                         12.0   20.58 )-----------( 219      ( 26 Nov 2020 00:18 )             38.7   11-26    145  |  106  |  47<H>  ----------------------------<  120<H>  4.2   |  23  |  2.57<H>    Ca    8.9      26 Nov 2020 00:55  Phos  4.4     11-26  Mg     2.3     11-26    PT/INR - ( 26 Nov 2020 00:18 )   PT: 12.4 sec;   INR: 1.04 ratio         PTT - ( 26 Nov 2020 00:18 )  PTT:90.6 sec  Ferritin, Serum: 579 ng/mL (11-26-20 @ 02:37)  D-Dimer Assay, Quantitative: 667 ng/mL DDU (11-26-20 @ 00:18)  Ferritin, Serum: 568 ng/mL (11-25-20 @ 04:00)  ABG - ( 26 Nov 2020 00:36 )  pH, Arterial: 7.37  pH, Blood: x     /  pCO2: 52    /  pO2: 75    / HCO3: 30    / Base Excess: 3.6   /  SaO2: 94        CAPILLARY BLOOD GLUCOSE    POCT Blood Glucose.: 114 mg/dL (26 Nov 2020 02:55)  POCT Blood Glucose.: 109 mg/dL (26 Nov 2020 01:50)  POCT Blood Glucose.: 106 mg/dL (26 Nov 2020 00:55)  POCT Blood Glucose.: 105 mg/dL (25 Nov 2020 23:52)  POCT Blood Glucose.: 116 mg/dL (25 Nov 2020 22:50)  POCT Blood Glucose.: 132 mg/dL (25 Nov 2020 21:50)  POCT Blood Glucose.: 168 mg/dL (25 Nov 2020 20:53)  POCT Blood Glucose.: 163 mg/dL (25 Nov 2020 19:51)  POCT Blood Glucose.: 185 mg/dL (25 Nov 2020 18:49)  POCT Blood Glucose.: 201 mg/dL (25 Nov 2020 18:03)  POCT Blood Glucose.: 210 mg/dL (25 Nov 2020 17:01)  POCT Blood Glucose.: 199 mg/dL (25 Nov 2020 16:27)  POCT Blood Glucose.: 225 mg/dL (25 Nov 2020 14:46)  POCT Blood Glucose.: 212 mg/dL (25 Nov 2020 13:50)  POCT Blood Glucose.: 218 mg/dL (25 Nov 2020 13:08)  POCT Blood Glucose.: 208 mg/dL (25 Nov 2020 11:57)  POCT Blood Glucose.: 178 mg/dL (25 Nov 2020 11:07)  POCT Blood Glucose.: 140 mg/dL (25 Nov 2020 09:03)  POCT Blood Glucose.: 148 mg/dL (25 Nov 2020 07:56)  POCT Blood Glucose.: 134 mg/dL (25 Nov 2020 07:08)  POCT Blood Glucose.: 138 mg/dL (25 Nov 2020 05:54)  POCT Blood Glucose.: 127 mg/dL (25 Nov 2020 05:02)  POCT Blood Glucose.: 124 mg/dL (25 Nov 2020 04:06)     MEDICATIONS  (STANDING):  albumin human 25% IVPB 50 milliLiter(s) IV Intermittent every 12 hours  chlorhexidine 0.12% Liquid 15 milliLiter(s) Oral Mucosa every 12 hours  chlorhexidine 4% Liquid 1 Application(s) Topical <User Schedule>  cisatracurium Infusion 3 MICROgram(s)/kG/Min (24.5 mL/Hr) IV Continuous <Continuous>  dexAMETHasone  Injectable 6 milliGRAM(s) IV Push daily  fentaNYL   Infusion... 0.5 MICROgram(s)/kG/Hr (3.4 mL/Hr) IV Continuous <Continuous>  heparin  Infusion 1500 Unit(s)/Hr (15 mL/Hr) IV Continuous <Continuous>  insulin regular Infusion 5 Unit(s)/Hr (5 mL/Hr) IV Continuous <Continuous>  midazolam Infusion 0.02 mG/kG/Hr (2.72 mL/Hr) IV Continuous <Continuous>  norepinephrine Infusion 0.05 MICROgram(s)/kG/Min (12.8 mL/Hr) IV Continuous <Continuous>  pantoprazole  Injectable 40 milliGRAM(s) IV Push daily  propofol Infusion 50 MICROgram(s)/kG/Min (40.8 mL/Hr) IV Continuous <Continuous>    MEDICATIONS  (PRN):  sodium chloride 0.9% lock flush 10 milliLiter(s) IV Push every 1 hour PRN Pre/post blood products, medications, blood draw, and to maintain line patency      RADIOLOGY/OTHER STUDIES:

## 2020-11-26 NOTE — CONSULT NOTE ADULT - PROBLEM SELECTOR RECOMMENDATION 9
Pt with ARTEMIO in the setting of XXX. Exact duration of ARTEMIO however unknown. Send UA, urine electrolytes, spot urine TP/CR. Will need to consider HD if renal failure continues to worsen. Monitor labs and urine output. Avoid NSAIDs, ACEI/ARBS, RCA and nephrotoxins. Dose medications as per eGFR. Pt with ARTEMIO in the setting of septic shock 2/2 to COVID infection. Now likely in ATN. Noted to have Scr of 0.53 on 11/23/20, then Scr further increased to 1.64 on 11/24/20, 2.18 on 11/25/20 and then further to 2.57 on 11/26/20. Currently with normal urine output. UA on 11/23/2o w 3RBC, 8WBC and positive protein. Urine Na on 11/24/20 w urine Na of < 35 suggestive of pre-renal etiology. Send  repeat UA, urine electrolytes, spot urine TP/CR. Agree with IV albumin for now. Will need to consider HD if renal failure continues to worsen. Monitor labs and urine output. Avoid NSAIDs, ACEI/ARBS, RCA and nephrotoxins. Dose medications as per eGFR.    Case discussed with on-call nephrology attending    If any questions, please feel free to contact me     Moy Alegre  Nephrology Fellow  [facility] Pager: [phone]  [facility] Pager: [phone] Pt with ARTEMIO in the setting of septic shock 2/2 to COVID infection. Now likely in ATN. Noted to have Scr of 0.53 on 11/23/20, then Scr further increased to 1.64 on 11/24/20, 2.18 on 11/25/20 and then further to 2.57 on 11/26/20. Currently with normal urine output. UA on 11/23/2o w 3RBC, 8WBC and positive protein. Urine Na on 11/24/20 w urine Na of < 35 suggestive of pre-renal etiology, however cr trend suggestive of ATN. Send  repeat UA, urine electrolytes, spot urine TP/CR. Agree with IV albumin for now. Will need to consider HD if renal failure continues to worsen. Monitor labs and urine output. Avoid NSAIDs, ACEI/ARBS, RCA and nephrotoxins. Dose medications as per eGFR.    Case discussed with on-call nephrology attending    If any questions, please feel free to contact me     Moy Alegre  Nephrology Fellow  [facility] Pager: [phone]  [facility] Pager: [phone]

## 2020-11-26 NOTE — CONSULT NOTE ADULT - ASSESSMENT
ARTEMIO ARTEMIO             Urinalysis - [11-23-20 @ 07:00]      Color Light Yellow / Appearance Slightly Turbid / SG 1.013 / pH 6.0      Gluc Negative / Ketone Negative  / Bili Negative / Urobili Negative       Blood Trace / Protein 30 mg/dL / Leuk Est Negative / Nitrite Negative      RBC 3 / WBC 8 / Hyaline 20 / Gran  / Sq Epi  / Non Sq Epi 10 / Bacteria Negative    Urine Protein 40      [11-24-20 @ 13:46]  Urine Sodium <35      [11-24-20 @ 10:18]  Urine Urea Nitrogen 449      [11-24-20 @ 13:46]  Urine Potassium 21      [11-24-20 @ 10:18]  Urine Phosphorus 68.0      [11-24-20 @ 13:46]

## 2020-11-26 NOTE — PROGRESS NOTE ADULT - ASSESSMENT
59 y/o AARON, htn, DVT, PE, asthma? admitted to [facility] for cough, SOB, diarrhea found to have covid19, hospital course c/b Acute Hypoxic resp failure d/t to covid19 pna, intubated on 11/22/2020 and transfer to MICU.    -Neuro: intubated and sedated cont prop/fent/versed and nimbex for vent synchrony.   -Cardiac: Distributive shock cont levo to maintain MAP >65 titrated as tolerated, can consider adding midodrine  -Resp: Acute hypoxic resp failure 2/2 to covid19 cont PRVC vent setting and titrated to maintain o2 >88%, cont pulm toliet  -GI: NPO, consider changing TF to nepro, GI ppx w/ protonix  -Renal: ARTEMIO cont to monitor I&O closely, albumin q12h x2 doses given 11/25, cont to trend and replete lytes prn  -ID: Covid19 now s/p remdesivir, cont dexa, off antibx  -Heme: Cont full dose AC given high risk for DVT/PE, w/in parameters  -Endo: DM2 a1c 7.5 w/ persistent hyperglycemia given 20U Lantus yesterday morning.    -Dispo: pt critically ill, remains in MICU, GOC ongoing pt is full code, prognosis guarded.

## 2020-11-26 NOTE — CONSULT NOTE ADULT - SUBJECTIVE AND OBJECTIVE BOX
[facility] DIVISION OF KIDNEY DISEASES AND HYPERTENSION -- [phone]  -- INITIAL CONSULT NOTE  --------------------------------------------------------------------------------  HPI: Pt is a 59 y/o F w PMH of AARON, HTN, HLD, prior DVT/PE hx, asthma presented to [facility] for SOB and productive cough prior to admission. Admitted for acute hypoxic respiratory failure  to COVID infection. Was subsequently intubated and proned on 20. Was transferred to ICU for further management. Was started on pressors and nimbex drip. Nephrology consulted for ARTEMIO.    Upon review of labs on [facility], patient noted to have Scr of 0.53 on 20, then Scr further increased to 1.64 on 20, 2.18 on 20 and then further to 2.57 on 20.         PAST HISTORY  --------------------------------------------------------------------------------  PAST MEDICAL & SURGICAL HISTORY:  Pneumomediastinum    Umbilical hernia  Reduciable    Osteoarthritis of both knees, unspecified osteoarthritis type    AARON (Obstructive Sleep Apnea)  category II pt uses c/pap pt to bering to hospital/  OR booking notified    Pneumonia      GERD (Gastroesophageal Reflux Disease)    Asthma  diagnosed   on adbvair denies attacks    DVT (Deep Venous Thrombosis)  left leg 6 m s/p knee replacement in     HTN (Hypertension)    H/O ileostomy  Ileostomy Revesal     S/P LEEP      S/P Exploratory Laparotomy    peritonitis  s/p right knee replacement/ colon resection and ileostomy    S/P Colonoscopy      S/P Endoscopy   gerd    S/P  Section      History of Tubal Ligation  s/p c/section     S/P Knee Surgery  2010      FAMILY HISTORY:  Family history of gastric cancer    Family history of breast cancer (Grandparent)      PAST SOCIAL HISTORY:    ALLERGIES & MEDICATIONS  --------------------------------------------------------------------------------  Allergies    Kiwi (Unknown)  latex (Anaphylaxis)  latex (Unknown)  penicillin (Other)  penicillin (Unknown)  perfume  hives (Other)  potassium acetate (Other)  soap additives hives (Other)    Intolerances      Standing Inpatient Medications  albumin human 25% IVPB 100 milliLiter(s) IV Intermittent every 8 hours  chlorhexidine 0.12% Liquid 15 milliLiter(s) Oral Mucosa every 12 hours  chlorhexidine 4% Liquid 1 Application(s) Topical <User Schedule>  cisatracurium Infusion 3 MICROgram(s)/kG/Min IV Continuous <Continuous>  dexAMETHasone  Injectable 6 milliGRAM(s) IV Push daily  fentaNYL   Infusion... 0.5 MICROgram(s)/kG/Hr IV Continuous <Continuous>  heparin  Infusion 1500 Unit(s)/Hr IV Continuous <Continuous>  insulin regular Infusion 5 Unit(s)/Hr IV Continuous <Continuous>  metoclopramide Injectable 10 milliGRAM(s) IV Push every 6 hours  midazolam Infusion 0.02 mG/kG/Hr IV Continuous <Continuous>  norepinephrine Infusion 0.05 MICROgram(s)/kG/Min IV Continuous <Continuous>  pantoprazole  Injectable 40 milliGRAM(s) IV Push daily  petrolatum Ophthalmic Ointment 1 Application(s) Both EYES two times a day  propofol Infusion 50 MICROgram(s)/kG/Min IV Continuous <Continuous>    PRN Inpatient Medications  sodium chloride 0.9% lock flush 10 milliLiter(s) IV Push every 1 hour PRN      REVIEW OF SYSTEMS  --------------------------------------------------------------------------------  Unable to obtain     All other systems were reviewed and are negative, except as noted.    VITALS/PHYSICAL EXAM  --------------------------------------------------------------------------------  T(C): 36.8 (20 @ 13:15), Max: 36.8 (20 @ 04:00)  HR: 85 (20 @ 16:40) (50 - 85)  BP: --  RR: 25 (20 @ 15:15) (25 - 28)  SpO2: 91% (20 @ 16:40) (90% - 95%)  Wt(kg): --        20 @ 07:01  -  20 @ 07:00  --------------------------------------------------------  IN: 2673.2 mL / OUT: 1115 mL / NET: 1558.2 mL    20 @ 07:01  -  20 @ 17:08  --------------------------------------------------------  IN: 25368.7 mL / OUT: 235 mL / NET: 13220.7 mL      Physical Exam:  	Will defer, patient Covid positive   Mechanical vent setting: currently on Fio2 70% and PEEP of 18,  and RR 25  Urine output 1.1L over 24h period     LABS/STUDIES  --------------------------------------------------------------------------------              12.0   20.58 >-----------<  219      [20 00:18]              38.7     145  |  106  |  47  ----------------------------<  120      [20 00:55]  4.2   |  23  |  2.57        Ca     8.9     [20:55]      Mg     2.3     [20 00:18]      Phos  4.4     [20:18]      PT/INR: PT 12.4 , INR 1.04       [20 00:18]  PTT: 90.6       [20 00:18]          [20 00:18]    Creatinine Trend:  SCr 2.57 [:55]  SCr 2.18 [:46]  SCr 1.64 [ 01:36]  SCr 0.53 [ 03:07]  SCr 0.62 [ 05:58]    Urinalysis - [20 @ 07:00]      Color Light Yellow / Appearance Slightly Turbid / SG 1.013 / pH 6.0      Gluc Negative / Ketone Negative  / Bili Negative / Urobili Negative       Blood Trace / Protein 30 mg/dL / Leuk Est Negative / Nitrite Negative      RBC 3 / WBC 8 / Hyaline 20 / Gran  / Sq Epi  / Non Sq Epi 10 / Bacteria Negative    Urine Protein 40      [20 @ 13:46]  Urine Sodium <35      [20 10:18]  Urine Urea Nitrogen 449      [20 13:46]  Urine Potassium 21      [20 10:18]  Urine Phosphorus 68.0      [20 13:46]    Ferritin 579      [20 @ 02:37]  HbA1c 6.6      [16 @ 06:00]  Lipid: chol --, , HDL --, LDL --      [20 @ 00:46]    HCV 0.10, Nonreact      [20 09:32]     [facility] DIVISION OF KIDNEY DISEASES AND HYPERTENSION -- [phone]  -- INITIAL CONSULT NOTE  --------------------------------------------------------------------------------  HPI: Pt is a 61 y/o F w PMH of AARON, HTN, HLD, prior DVT/PE hx, asthma presented to [facility] for SOB and productive cough prior to admission. Admitted for acute hypoxic respiratory failure  to COVID infection. Was subsequently intubated and proned on 20. Was transferred to ICU for further management. Was started on pressors and nimbex drip. Nephrology consulted for ARTEMIO.    Upon review of labs on [facility]/[place], patient noted to have Scr of 0.53 on 20, then Scr further increased to 1.64 on 20, 2.18 on 20 and then further to 2.57 on 20.         PAST HISTORY  --------------------------------------------------------------------------------  PAST MEDICAL & SURGICAL HISTORY:  Pneumomediastinum    Umbilical hernia  Reduciable    Osteoarthritis of both knees, unspecified osteoarthritis type    AARON (Obstructive Sleep Apnea)  category II pt uses c/pap pt to bering to hospital/  OR booking notified    Pneumonia      GERD (Gastroesophageal Reflux Disease)    Asthma  diagnosed   on adbvair denies attacks    DVT (Deep Venous Thrombosis)  left leg 6 m s/p knee replacement in     HTN (Hypertension)    H/O ileostomy  Ileostomy Revesal     S/P LEEP      S/P Exploratory Laparotomy    peritonitis  s/p right knee replacement/ colon resection and ileostomy    S/P Colonoscopy      S/P Endoscopy   gerd    S/P  Section      History of Tubal Ligation  s/p c/section     S/P Knee Surgery  2010      FAMILY HISTORY:  Family history of gastric cancer    Family history of breast cancer (Grandparent)      PAST SOCIAL HISTORY:    ALLERGIES & MEDICATIONS  --------------------------------------------------------------------------------  Allergies    Kiwi (Unknown)  latex (Anaphylaxis)  latex (Unknown)  penicillin (Other)  penicillin (Unknown)  perfume  hives (Other)  potassium acetate (Other)  soap additives hives (Other)    Intolerances      Standing Inpatient Medications  albumin human 25% IVPB 100 milliLiter(s) IV Intermittent every 8 hours  chlorhexidine 0.12% Liquid 15 milliLiter(s) Oral Mucosa every 12 hours  chlorhexidine 4% Liquid 1 Application(s) Topical <User Schedule>  cisatracurium Infusion 3 MICROgram(s)/kG/Min IV Continuous <Continuous>  dexAMETHasone  Injectable 6 milliGRAM(s) IV Push daily  fentaNYL   Infusion... 0.5 MICROgram(s)/kG/Hr IV Continuous <Continuous>  heparin  Infusion 1500 Unit(s)/Hr IV Continuous <Continuous>  insulin regular Infusion 5 Unit(s)/Hr IV Continuous <Continuous>  metoclopramide Injectable 10 milliGRAM(s) IV Push every 6 hours  midazolam Infusion 0.02 mG/kG/Hr IV Continuous <Continuous>  norepinephrine Infusion 0.05 MICROgram(s)/kG/Min IV Continuous <Continuous>  pantoprazole  Injectable 40 milliGRAM(s) IV Push daily  petrolatum Ophthalmic Ointment 1 Application(s) Both EYES two times a day  propofol Infusion 50 MICROgram(s)/kG/Min IV Continuous <Continuous>    PRN Inpatient Medications  sodium chloride 0.9% lock flush 10 milliLiter(s) IV Push every 1 hour PRN      REVIEW OF SYSTEMS  --------------------------------------------------------------------------------  Unable to obtain     All other systems were reviewed and are negative, except as noted.    VITALS/PHYSICAL EXAM  --------------------------------------------------------------------------------  T(C): 36.8 (20 @ 13:15), Max: 36.8 (20 @ 04:00)  HR: 85 (20 @ 16:40) (50 - 85)  BP: --  RR: 25 (20 @ 15:15) (25 - 28)  SpO2: 91% (20 @ 16:40) (90% - 95%)  Wt(kg): --        20 @ 07:01  -  20 @ 07:00  --------------------------------------------------------  IN: 2673.2 mL / OUT: 1115 mL / NET: 1558.2 mL    20 @ 07:01  -  20 @ 17:08  --------------------------------------------------------  IN: 66601.7 mL / OUT: 235 mL / NET: 25039.7 mL      Physical Exam:  	Will defer, patient Covid positive   Mechanical vent setting: currently on Fio2 70% and PEEP of 18,  and RR 25  Urine output 1.1L over 24h period     LABS/STUDIES  --------------------------------------------------------------------------------              12.0   20.58 >-----------<  219      [20 00:18]              38.7     145  |  106  |  47  ----------------------------<  120      [20 00:55]  4.2   |  23  |  2.57        Ca     8.9     [20:55]      Mg     2.3     [20 00:18]      Phos  4.4     [20:18]      PT/INR: PT 12.4 , INR 1.04       [20 00:18]  PTT: 90.6       [20 00:18]          [20 00:18]    Creatinine Trend:  SCr 2.57 [:55]  SCr 2.18 [ 00:46]  SCr 1.64 [ 01:36]  SCr 0.53 [ 03:07]  SCr 0.62 [ 05:58]    Urinalysis - [20 @ 07:00]      Color Light Yellow / Appearance Slightly Turbid / SG 1.013 / pH 6.0      Gluc Negative / Ketone Negative  / Bili Negative / Urobili Negative       Blood Trace / Protein 30 mg/dL / Leuk Est Negative / Nitrite Negative      RBC 3 / WBC 8 / Hyaline 20 / Gran  / Sq Epi  / Non Sq Epi 10 / Bacteria Negative    Urine Protein 40      [20 @ 13:46]  Urine Sodium <35      [20 10:18]  Urine Urea Nitrogen 449      [20 13:46]  Urine Potassium 21      [20 10:18]  Urine Phosphorus 68.0      [20 13:46]    Ferritin 579      [20 @ 02:37]  HbA1c 6.6      [16 @ 06:00]  Lipid: chol --, , HDL --, LDL --      [20 @ 00:46]    HCV 0.10, Nonreact      [20 @ 09:32]

## 2020-11-26 NOTE — PROGRESS NOTE ADULT - ATTENDING COMMENTS
60F w/ pmhx morbid obesity, AARON, HTN, PE in the past, p/w  acute hypoxic respiratory failure d/t COVID+ pneumonia  and now ARDS. Pt intubated and  Stable sat currently after paralysis and proning. Now supine, not requiring proning now as fio2 decreasing. Cont LTV ventilation. Cont paralysis and monitor for synchrony.  Cont pressors to maintain map>65. Cont sedation for analgosedation and synchrony. Titrate fio2/peep to sat>90% if possible. Higher peep used for morbid obesity and body habitus causing increased chest wall elastance. Cont a/c for VTE hx and covid, monitor PTT and for bleeding. Pt w/ ARTEMIO that is nonoliguric. Renal consult called. Remains on insulin gtt for significant hyperglycemia likely in setting of dexamethasone. Prognosis guarded.

## 2020-11-27 LAB
ANION GAP SERPL CALC-SCNC: 12 MMOL/L — SIGNIFICANT CHANGE UP (ref 5–17)
APPEARANCE UR: ABNORMAL
APTT BLD: 69.8 SEC — HIGH (ref 27.5–35.5)
BACTERIA # UR AUTO: ABNORMAL
BASE EXCESS BLDV CALC-SCNC: -4.6 MMOL/L — LOW (ref -2–2)
BILIRUB UR-MCNC: NEGATIVE — SIGNIFICANT CHANGE UP
BUN SERPL-MCNC: 64 MG/DL — HIGH (ref 7–23)
CA-I SERPL-SCNC: 1.1 MMOL/L — LOW (ref 1.12–1.3)
CALCIUM SERPL-MCNC: 8.6 MG/DL — SIGNIFICANT CHANGE UP (ref 8.4–10.5)
CHLORIDE BLDV-SCNC: 107 MMOL/L — SIGNIFICANT CHANGE UP (ref 96–108)
CHLORIDE SERPL-SCNC: 105 MMOL/L — SIGNIFICANT CHANGE UP (ref 96–108)
CO2 BLDV-SCNC: 27 MMOL/L — SIGNIFICANT CHANGE UP (ref 22–30)
CO2 SERPL-SCNC: 25 MMOL/L — SIGNIFICANT CHANGE UP (ref 22–31)
COLOR SPEC: YELLOW — SIGNIFICANT CHANGE UP
CREAT SERPL-MCNC: 3 MG/DL — HIGH (ref 0.5–1.3)
CRP SERPL-MCNC: 31.18 MG/DL — HIGH (ref 0–0.4)
D DIMER BLD IA.RAPID-MCNC: 2844 NG/ML DDU — HIGH
DIFF PNL FLD: NEGATIVE — SIGNIFICANT CHANGE UP
EPI CELLS # UR: 2 — SIGNIFICANT CHANGE UP
FERRITIN SERPL-MCNC: 709 NG/ML — HIGH (ref 15–150)
GAS PNL BLDA: SIGNIFICANT CHANGE UP
GAS PNL BLDA: SIGNIFICANT CHANGE UP
GAS PNL BLDV: 140 MMOL/L — SIGNIFICANT CHANGE UP (ref 135–145)
GAS PNL BLDV: SIGNIFICANT CHANGE UP
GLUCOSE BLDC GLUCOMTR-MCNC: 114 MG/DL — HIGH (ref 70–99)
GLUCOSE BLDC GLUCOMTR-MCNC: 118 MG/DL — HIGH (ref 70–99)
GLUCOSE BLDC GLUCOMTR-MCNC: 122 MG/DL — HIGH (ref 70–99)
GLUCOSE BLDC GLUCOMTR-MCNC: 123 MG/DL — HIGH (ref 70–99)
GLUCOSE BLDC GLUCOMTR-MCNC: 125 MG/DL — HIGH (ref 70–99)
GLUCOSE BLDC GLUCOMTR-MCNC: 130 MG/DL — HIGH (ref 70–99)
GLUCOSE BLDC GLUCOMTR-MCNC: 133 MG/DL — HIGH (ref 70–99)
GLUCOSE BLDC GLUCOMTR-MCNC: 135 MG/DL — HIGH (ref 70–99)
GLUCOSE BLDC GLUCOMTR-MCNC: 142 MG/DL — HIGH (ref 70–99)
GLUCOSE BLDC GLUCOMTR-MCNC: 150 MG/DL — HIGH (ref 70–99)
GLUCOSE BLDC GLUCOMTR-MCNC: 153 MG/DL — HIGH (ref 70–99)
GLUCOSE BLDC GLUCOMTR-MCNC: 155 MG/DL — HIGH (ref 70–99)
GLUCOSE BLDC GLUCOMTR-MCNC: 159 MG/DL — HIGH (ref 70–99)
GLUCOSE BLDC GLUCOMTR-MCNC: 174 MG/DL — HIGH (ref 70–99)
GLUCOSE BLDC GLUCOMTR-MCNC: 190 MG/DL — HIGH (ref 70–99)
GLUCOSE BLDC GLUCOMTR-MCNC: 192 MG/DL — HIGH (ref 70–99)
GLUCOSE BLDC GLUCOMTR-MCNC: 194 MG/DL — HIGH (ref 70–99)
GLUCOSE BLDC GLUCOMTR-MCNC: 196 MG/DL — HIGH (ref 70–99)
GLUCOSE BLDC GLUCOMTR-MCNC: 201 MG/DL — HIGH (ref 70–99)
GLUCOSE BLDC GLUCOMTR-MCNC: 203 MG/DL — HIGH (ref 70–99)
GLUCOSE BLDC GLUCOMTR-MCNC: 205 MG/DL — HIGH (ref 70–99)
GLUCOSE BLDC GLUCOMTR-MCNC: 206 MG/DL — HIGH (ref 70–99)
GLUCOSE BLDC GLUCOMTR-MCNC: 206 MG/DL — HIGH (ref 70–99)
GLUCOSE BLDC GLUCOMTR-MCNC: 211 MG/DL — HIGH (ref 70–99)
GLUCOSE BLDC GLUCOMTR-MCNC: 70 MG/DL — SIGNIFICANT CHANGE UP (ref 70–99)
GLUCOSE BLDV-MCNC: 215 MG/DL — HIGH (ref 70–99)
GLUCOSE SERPL-MCNC: 136 MG/DL — HIGH (ref 70–99)
GLUCOSE UR QL: NEGATIVE — SIGNIFICANT CHANGE UP
GRAM STN FLD: SIGNIFICANT CHANGE UP
HCO3 BLDV-SCNC: 25 MMOL/L — SIGNIFICANT CHANGE UP (ref 21–29)
HCT VFR BLDA CALC: 33 % — LOW (ref 39–50)
HGB BLD CALC-MCNC: 10.7 G/DL — LOW (ref 11.5–15.5)
HOROWITZ INDEX BLDV+IHG-RTO: 100 — SIGNIFICANT CHANGE UP
HYALINE CASTS # UR AUTO: 4 /LPF — HIGH (ref 0–7)
INR BLD: 1.02 RATIO — SIGNIFICANT CHANGE UP (ref 0.88–1.16)
KETONES UR-MCNC: NEGATIVE — SIGNIFICANT CHANGE UP
LACTATE BLDV-MCNC: 1.7 MMOL/L — SIGNIFICANT CHANGE UP (ref 0.7–2)
LDH SERPL L TO P-CCNC: 327 U/L — HIGH (ref 50–242)
LEUKOCYTE ESTERASE UR-ACNC: ABNORMAL
MAGNESIUM SERPL-MCNC: 2.6 MG/DL — SIGNIFICANT CHANGE UP (ref 1.6–2.6)
METHOD TYPE: SIGNIFICANT CHANGE UP
MSSA DNA SPEC QL NAA+PROBE: SIGNIFICANT CHANGE UP
NITRITE UR-MCNC: NEGATIVE — SIGNIFICANT CHANGE UP
NRBC # BLD: 0 /100 WBCS — SIGNIFICANT CHANGE UP (ref 0–0)
OSMOLALITY UR: 353 MOS/KG — SIGNIFICANT CHANGE UP (ref 300–900)
OTHER CELLS CSF MANUAL: 11 ML/DL — LOW (ref 18–22)
PCO2 BLDV: 71 MMHG — HIGH (ref 35–50)
PH BLDV: 7.17 — CRITICAL LOW (ref 7.35–7.45)
PH UR: 5.5 — SIGNIFICANT CHANGE UP (ref 5–8)
PHOSPHATE SERPL-MCNC: 5 MG/DL — HIGH (ref 2.5–4.5)
PO2 BLDV: 48 MMHG — HIGH (ref 25–45)
POTASSIUM BLDV-SCNC: 4.8 MMOL/L — SIGNIFICANT CHANGE UP (ref 3.5–5.3)
POTASSIUM SERPL-MCNC: 4.6 MMOL/L — SIGNIFICANT CHANGE UP (ref 3.5–5.3)
POTASSIUM SERPL-SCNC: 4.6 MMOL/L — SIGNIFICANT CHANGE UP (ref 3.5–5.3)
PROT UR-MCNC: ABNORMAL
PROTHROM AB SERPL-ACNC: 12.2 SEC — SIGNIFICANT CHANGE UP (ref 10.6–13.6)
RBC CASTS # UR COMP ASSIST: 3 /HPF — SIGNIFICANT CHANGE UP (ref 0–4)
SAO2 % BLDV: 73 % — SIGNIFICANT CHANGE UP (ref 67–88)
SODIUM SERPL-SCNC: 142 MMOL/L — SIGNIFICANT CHANGE UP (ref 135–145)
SODIUM UR-SCNC: <35 MMOL/L — SIGNIFICANT CHANGE UP
SP GR SPEC: 1.02 — SIGNIFICANT CHANGE UP (ref 1.01–1.02)
SPECIMEN SOURCE: SIGNIFICANT CHANGE UP
URATE CRY FLD QL MICRO: ABNORMAL
UROBILINOGEN FLD QL: NEGATIVE — SIGNIFICANT CHANGE UP
WBC # BLD: 19.44 K/UL — HIGH (ref 3.8–10.5)
WBC # FLD AUTO: 19.44 K/UL — HIGH (ref 3.8–10.5)
WBC UR QL: 8 /HPF — HIGH (ref 0–5)

## 2020-11-27 PROCEDURE — 71045 X-RAY EXAM CHEST 1 VIEW: CPT | Mod: 26,77

## 2020-11-27 PROCEDURE — 99291 CRITICAL CARE FIRST HOUR: CPT

## 2020-11-27 PROCEDURE — 71045 X-RAY EXAM CHEST 1 VIEW: CPT | Mod: 26

## 2020-11-27 PROCEDURE — 99233 SBSQ HOSP IP/OBS HIGH 50: CPT | Mod: GC

## 2020-11-27 RX ORDER — ACETYLCYSTEINE 200 MG/ML
4 VIAL (ML) MISCELLANEOUS THREE TIMES A DAY
Refills: 0 | Status: COMPLETED | OUTPATIENT
Start: 2020-11-27 | End: 2020-11-28

## 2020-11-27 RX ORDER — CHLORHEXIDINE GLUCONATE 213 G/1000ML
1 SOLUTION TOPICAL
Refills: 0 | Status: DISCONTINUED | OUTPATIENT
Start: 2020-11-27 | End: 2020-11-27

## 2020-11-27 RX ORDER — IPRATROPIUM BROMIDE 0.2 MG/ML
2 SOLUTION, NON-ORAL INHALATION EVERY 6 HOURS
Refills: 0 | Status: ACTIVE | OUTPATIENT
Start: 2020-11-27 | End: 2021-10-26

## 2020-11-27 RX ORDER — VANCOMYCIN HCL 1 G
2000 VIAL (EA) INTRAVENOUS ONCE
Refills: 0 | Status: COMPLETED | OUTPATIENT
Start: 2020-11-27 | End: 2020-11-27

## 2020-11-27 RX ORDER — SODIUM CHLORIDE 9 MG/ML
4 INJECTION INTRAMUSCULAR; INTRAVENOUS; SUBCUTANEOUS EVERY 6 HOURS
Refills: 0 | Status: DISCONTINUED | OUTPATIENT
Start: 2020-11-27 | End: 2020-11-27

## 2020-11-27 RX ORDER — SODIUM BICARBONATE 1 MEQ/ML
50 SYRINGE (ML) INTRAVENOUS ONCE
Refills: 0 | Status: COMPLETED | OUTPATIENT
Start: 2020-11-27 | End: 2020-11-27

## 2020-11-27 RX ORDER — ALBUTEROL 90 UG/1
4 AEROSOL, METERED ORAL EVERY 6 HOURS
Refills: 0 | Status: ACTIVE | OUTPATIENT
Start: 2020-11-27 | End: 2021-10-26

## 2020-11-27 RX ORDER — BUMETANIDE 0.25 MG/ML
2 INJECTION INTRAMUSCULAR; INTRAVENOUS ONCE
Refills: 0 | Status: COMPLETED | OUTPATIENT
Start: 2020-11-27 | End: 2020-11-27

## 2020-11-27 RX ORDER — BUMETANIDE 0.25 MG/ML
2 INJECTION INTRAMUSCULAR; INTRAVENOUS
Qty: 20 | Refills: 0 | Status: DISCONTINUED | OUTPATIENT
Start: 2020-11-27 | End: 2020-11-27

## 2020-11-27 RX ORDER — VANCOMYCIN HCL 1 G
2000 VIAL (EA) INTRAVENOUS EVERY 12 HOURS
Refills: 0 | Status: DISCONTINUED | OUTPATIENT
Start: 2020-11-28 | End: 2020-11-29

## 2020-11-27 RX ORDER — CHLORHEXIDINE GLUCONATE 213 G/1000ML
1 SOLUTION TOPICAL
Refills: 0 | Status: ACTIVE | OUTPATIENT
Start: 2020-11-27 | End: 2021-10-26

## 2020-11-27 RX ORDER — VANCOMYCIN HCL 1 G
VIAL (EA) INTRAVENOUS
Refills: 0 | Status: DISCONTINUED | OUTPATIENT
Start: 2020-11-27 | End: 2020-11-27

## 2020-11-27 RX ORDER — VANCOMYCIN HCL 1 G
VIAL (EA) INTRAVENOUS
Refills: 0 | Status: DISCONTINUED | OUTPATIENT
Start: 2020-11-27 | End: 2020-11-29

## 2020-11-27 RX ORDER — VANCOMYCIN HCL 1 G
2000 VIAL (EA) INTRAVENOUS ONCE
Refills: 0 | Status: DISCONTINUED | OUTPATIENT
Start: 2020-11-27 | End: 2020-11-27

## 2020-11-27 RX ORDER — ALBUTEROL 90 UG/1
1 AEROSOL, METERED ORAL ONCE
Refills: 0 | Status: COMPLETED | OUTPATIENT
Start: 2020-11-27 | End: 2020-11-27

## 2020-11-27 RX ORDER — CHLOROTHIAZIDE 500 MG
500 TABLET ORAL EVERY 12 HOURS
Refills: 0 | Status: DISCONTINUED | OUTPATIENT
Start: 2020-11-27 | End: 2020-11-28

## 2020-11-27 RX ORDER — MIDODRINE HYDROCHLORIDE 2.5 MG/1
10 TABLET ORAL EVERY 8 HOURS
Refills: 0 | Status: DISCONTINUED | OUTPATIENT
Start: 2020-11-27 | End: 2020-12-07

## 2020-11-27 RX ADMIN — Medication 50 MILLIEQUIVALENT(S): at 17:10

## 2020-11-27 RX ADMIN — Medication 10 MILLIGRAM(S): at 05:08

## 2020-11-27 RX ADMIN — BUMETANIDE 10 MG/HR: 0.25 INJECTION INTRAMUSCULAR; INTRAVENOUS at 09:10

## 2020-11-27 RX ADMIN — ALBUTEROL 1 PUFF(S): 90 AEROSOL, METERED ORAL at 14:02

## 2020-11-27 RX ADMIN — Medication 100 MILLIGRAM(S): at 17:14

## 2020-11-27 RX ADMIN — ALBUTEROL 4 PUFF(S): 90 AEROSOL, METERED ORAL at 17:31

## 2020-11-27 RX ADMIN — BUMETANIDE 2 MILLIGRAM(S): 0.25 INJECTION INTRAMUSCULAR; INTRAVENOUS at 02:35

## 2020-11-27 RX ADMIN — Medication 10 MILLIGRAM(S): at 17:14

## 2020-11-27 RX ADMIN — CEFTRIAXONE 100 MILLIGRAM(S): 500 INJECTION, POWDER, FOR SOLUTION INTRAMUSCULAR; INTRAVENOUS at 20:50

## 2020-11-27 RX ADMIN — Medication 50 MILLILITER(S): at 05:09

## 2020-11-27 RX ADMIN — Medication 100 MILLIGRAM(S): at 14:00

## 2020-11-27 RX ADMIN — ALBUTEROL 4 PUFF(S): 90 AEROSOL, METERED ORAL at 11:29

## 2020-11-27 RX ADMIN — Medication 1 APPLICATION(S): at 17:15

## 2020-11-27 RX ADMIN — CHLORHEXIDINE GLUCONATE 1 APPLICATION(S): 213 SOLUTION TOPICAL at 05:08

## 2020-11-27 RX ADMIN — CHLORHEXIDINE GLUCONATE 15 MILLILITER(S): 213 SOLUTION TOPICAL at 05:08

## 2020-11-27 RX ADMIN — CHLORHEXIDINE GLUCONATE 15 MILLILITER(S): 213 SOLUTION TOPICAL at 17:13

## 2020-11-27 RX ADMIN — Medication 2 PUFF(S): at 11:31

## 2020-11-27 RX ADMIN — Medication 10 MILLIGRAM(S): at 11:19

## 2020-11-27 RX ADMIN — Medication 4 MILLILITER(S): at 14:01

## 2020-11-27 RX ADMIN — MIDODRINE HYDROCHLORIDE 10 MILLIGRAM(S): 2.5 TABLET ORAL at 14:15

## 2020-11-27 RX ADMIN — ALBUTEROL 4 PUFF(S): 90 AEROSOL, METERED ORAL at 06:22

## 2020-11-27 RX ADMIN — PANTOPRAZOLE SODIUM 40 MILLIGRAM(S): 20 TABLET, DELAYED RELEASE ORAL at 11:19

## 2020-11-27 RX ADMIN — MIDODRINE HYDROCHLORIDE 10 MILLIGRAM(S): 2.5 TABLET ORAL at 21:07

## 2020-11-27 RX ADMIN — Medication 1 APPLICATION(S): at 05:09

## 2020-11-27 RX ADMIN — Medication 50 MILLIEQUIVALENT(S): at 17:49

## 2020-11-27 RX ADMIN — Medication 250 MILLIGRAM(S): at 21:49

## 2020-11-27 RX ADMIN — Medication 6 MILLIGRAM(S): at 05:08

## 2020-11-27 RX ADMIN — Medication 2 PUFF(S): at 17:32

## 2020-11-27 NOTE — CHART NOTE - NSCHARTNOTEFT_GEN_A_CORE
Interim Note    Chart reviewed, events noted. Pt with worsening ARTEMIO. Continues on pressors, paralytic.     Currently ordered for: Nepro @ 30mL/hr x 24 hours  - Previously was prescribed Vital AF 20mL/hr x 24 + 4 NoCarb Prosource     Current regimen provides: 720mL formula, 1274kcal and 58g protein.    Propofol Provides: 20.4mL/hr = 539kcal over the next 24 hours.   --> Current EN regimen with propofol exceeds estimated energy needs.    Nutrition-Related Events:   - Albumin q12h x2 doses given 11/25  - Cr rising, 3.00 today.  - Na, K+ WNL, Phos 5.0 <H>  - Per nephrology, pt may need HD if renal failure continues to worsen.   - Continues on dexamethasone, BG controlled with insulin infusion.     GI: No BM since 11/22, pt ordered for reglan (11/26)    Labs:  - 11-26 Na142 mmol/L Glu 136 mg/dL<H> K+ 4.6 mmol/L Cr  3.00 mg/dL<H> BUN 64 mg/dL<H> Phos 5.0 mg/dL<H> Alb n/a   PAB n/a     POCT Blood Glucose.: 135 mg/dL (11-27-20 @ 06:55)  POCT Blood Glucose.: 130 mg/dL (11-27-20 @ 06:11)  POCT Blood Glucose.: 114 mg/dL (11-27-20 @ 05:02)  POCT Blood Glucose.: 122 mg/dL (11-27-20 @ 04:08)  POCT Blood Glucose.: 118 mg/dL (11-27-20 @ 03:01)  POCT Blood Glucose.: 123 mg/dL (11-27-20 @ 02:05)    Estimated Needs:   -IBW 45.45kg used for energy and protein needs in setting of BMI >50  Energy (22-25 kcal/kg): 3399-3966 kcal  Protein: (2.0-2.5 g/kg): 91-114g protein  Torsten State Equation (REE): 2147 kcal in setting of fever     Edema: 3+ generalized    Recommended Interventions:  1) Continue Nepro formula. Please decrease rate to: 15mL/hr x 24 hours + 4 No Carb Prosource (provides additional 240kcal and 60g protein).  - Regimen at goal provides (with Prosource): 360mL total volume, 877kcal and 89g protein, 261mL free water.  - With Propofol (529kcal): 1416kcal and 89g protein.      - Meets: 31kcal/kg and 2.0g protein/kg of IBW 45.45kg.  2) Monitor tolerance to feeds.   3) Monitor electrolytes, weights, fluid status.  4) Free water deferred to team. Current regimen provides 261mL free water.     RD is available to adjust EN PRN, will continue to monitor propofol needs.

## 2020-11-27 NOTE — PROGRESS NOTE ADULT - ATTENDING COMMENTS
I have seen this patient with the fellow and agree with their assessment and plan. In addition,    # ARTEMIO/ ATN - Secondary to septic shock. Serum creatinine up to 3.00 (baseline 0.5). Urine output 20ml/hour for the past few hours despite IV bumetanide 2mg/hr and chlorothiazide. Now on Fi02 100%. Will need to start renal replacement therapy for volume overload. Given hemodynamically unstable, we will start CVVHDF. Consent taken from  Mr Sctoty Hamilton.     # Volume overload - secondary to ARTEMIO. CRRT as above. UF goal ~50ml/hour. May need more vasopressors to achieve UF goal.    # Medication toxicity Monitoring: medication dose reviewed. Please dose medications to CrCl<15    The rest of the recommendations as per fellow's note.    Cindy Roe MD  Attending Nephrologist  [phone]

## 2020-11-27 NOTE — PROGRESS NOTE ADULT - SUBJECTIVE AND OBJECTIVE BOX
INTERVAL HPI: 59 y/o AARON, htn, DVT, PE, asthma? admitted to [facility] for cough, SOB, diarrhea found to have covid19, hospital course c/b Acute Hypoxic resp failure d/t to covid19 pna, intubated on 2020 and transfer to MICU.     24 hour events: Patient with worsening PF ratio, attempted to prone last night with no success as patient did not tolerate, needed to be resuspined shortly after due to hypoxia. Renal fx continue to worsen  Review of Systems: Unable to obtain 2/2 sedation    SUBJECTIVE: Patient seen and examined at bedside.       VITAL SIGNS:  ICU Vital Signs Last 24 Hrs  T(C): 36.8 (2020 08:00), Max: 38.5 (2020 20:00)  T(F): 98.2 (2020 08:00), Max: 101.3 (2020 20:00)  HR: 91 (2020 06:45) (55 - 116)  BP: --  BP(mean): --  ABP: 142/62 (2020 06:45) (84/40 - 150/60)  ABP(mean): 88 (2020 06:45) (56 - 88)  RR: 30 (2020 06:45) (25 - 30)  SpO2: 92% (2020 06:45) (77% - 95%)    Mode: AC/ CMV (Assist Control/ Continuous Mandatory Ventilation), RR (machine): 25, TV (machine): 350, FiO2: 80, PEEP: 18, ITime: 1, MAP: 21, PIP: 30  Plateau pressure: 23  P/F ratio: 72     @ : @ 07:00  --------------------------------------------------------  IN: 2651.6 mL / OUT: 640 mL / NET: 2011.6 mL     @ 07: @ 08:45  --------------------------------------------------------  IN: 115.6 mL / OUT: 30 mL / NET: 85.6 mL      CAPILLARY BLOOD GLUCOSE      POCT Blood Glucose.: 153 mg/dL (2020 08:00)    ECG:< from: 12 Lead ECG (20 @ 21:56) >  Ventricular Rate 94 BPM    Atrial Rate 94 BPM    P-R Interval 162 ms    QRS Duration 86 ms    Q-T Interval 344 ms    QTC Calculation(Bazett) 430 ms    P Axis 16 degrees    R Axis 52 degrees    T Axis -24 degrees    Diagnosis Line NORMAL SINUS RHYTHM  POSSIBLE LEFT ATRIAL ENLARGEMENT  NONSPECIFIC T WAVE ABNORMALITY  ABNORMAL ECG  WHEN COMPARED WITH ECG OF 18-OCT-2017 16:44,  NO SIGNIFICANT CHANGE WAS FOUND  Confirmed by MD KONRAD, MARIANELA ([id number]) on 2020 2:28:44 PM    < end of copied text >    PHYSICAL EXAM:   Gen: Comfortable in bed in NAD, resupined  Neuro: sedated, paralyzed and intubated  Resp: good air entry b/l  CVS: +RRR  Abd: BSx4, soft, ND, large pannus  Ext: +2 edema   Skin: warm/dry    MEDICATIONS:  MEDICATIONS  (STANDING):  ALBUTerol    90 MICROgram(s) HFA Inhaler 4 Puff(s) Inhalation every 6 hours  buMETAnide Infusion 2 mG/Hr (10 mL/Hr) IV Continuous <Continuous>  cefTRIAXone   IVPB 1000 milliGRAM(s) IV Intermittent every 24 hours  chlorhexidine 0.12% Liquid 15 milliLiter(s) Oral Mucosa every 12 hours  chlorhexidine 4% Liquid 1 Application(s) Topical <User Schedule>  cisatracurium Infusion 3 MICROgram(s)/kG/Min (24.5 mL/Hr) IV Continuous <Continuous>  dexAMETHasone  Injectable 6 milliGRAM(s) IV Push daily  fentaNYL   Infusion... 0.5 MICROgram(s)/kG/Hr (3.4 mL/Hr) IV Continuous <Continuous>  heparin  Infusion 1500 Unit(s)/Hr (15 mL/Hr) IV Continuous <Continuous>  insulin regular Infusion 5 Unit(s)/Hr (5 mL/Hr) IV Continuous <Continuous>  ipratropium 17 MICROgram(s) HFA Inhaler 2 Puff(s) Inhalation every 6 hours  metoclopramide Injectable 10 milliGRAM(s) IV Push every 6 hours  midazolam Infusion 0.02 mG/kG/Hr (2.72 mL/Hr) IV Continuous <Continuous>  norepinephrine Infusion 0.05 MICROgram(s)/kG/Min (12.8 mL/Hr) IV Continuous <Continuous>  pantoprazole  Injectable 40 milliGRAM(s) IV Push daily  petrolatum Ophthalmic Ointment 1 Application(s) Both EYES two times a day  propofol Infusion 50 MICROgram(s)/kG/Min (40.8 mL/Hr) IV Continuous <Continuous>    MEDICATIONS  (PRN):  acetaminophen    Suspension .. 650 milliGRAM(s) Oral every 6 hours PRN Temp greater or equal to 38C (100.4F)  sodium chloride 0.9% lock flush 10 milliLiter(s) IV Push every 1 hour PRN Pre/post blood products, medications, blood draw, and to maintain line patency      ALLERGIES:  Kiwi (Unknown)  latex (Anaphylaxis)  latex (Unknown)  penicillin (Other)  penicillin (Unknown)  perfume  hives (Other)  potassium acetate (Other)  soap additives hives (Other)  Intolerances    LABS:                        11.1   19.44 )-----------( 172      ( 2020 23:52 )             35.8         142  |  105  |  64<H>  ----------------------------<  136<H>  4.6   |  25  |  3.00<H>    Ca    8.6      2020 23:52  Phos  5.0       Mg     2.6           PT/INR - ( 2020 23:52 )   PT: 12.2 sec;   INR: 1.02 ratio         PTT - ( 2020 23:52 )  PTT:69.8 sec  Urinalysis Basic - ( 2020 23:57 )    Color: Yellow / Appearance: Slightly Turbid / S.021 / pH: x  Gluc: x / Ketone: Negative  / Bili: Negative / Urobili: Negative   Blood: x / Protein: 30 mg/dL / Nitrite: Negative   Leuk Esterase: Moderate / RBC: 3 /hpf / WBC 8 /HPF   Sq Epi: x / Non Sq Epi: 2 / Bacteria: Occasional        RADIOLOGY & ADDITIONAL TESTS: Reviewed.

## 2020-11-27 NOTE — PROGRESS NOTE ADULT - PROBLEM SELECTOR PLAN 1
Pt with ARTEMIO in the setting of septic shock 2/2 to COVID infection. Now likely in ATN. Noted to have Scr of 0.53 on 11/23/20, then Scr further increased to 1.64 on 11/24/20, 2.18 on 11/25/20 and then further to 2.57 on 11/26/20 and today was 3. Currently with normal urine output but decreasing. Started on bumex drip. Lastest UA with mod leuk est, 8WBC and 3RBC. Urine Na on 11/26/20 w urine Na of < 35 suggestive of pre-renal etiology, however cr trend suggestive of ATN. Agree with bumex drip, in attempt to improve respiratory status. If poor urinary response and Scr continues to worsen, then will need to consider HD/CRRT. Monitor labs and urine output. Avoid NSAIDs, ACEI/ARBS, RCA and nephrotoxins. Dose medications as per eGFR    If any questions, please feel free to contact me     Moy Alegre  Nephrology Fellow  [facility] Pager: [phone]  [facility] Pager: [phone]

## 2020-11-27 NOTE — PROGRESS NOTE ADULT - ATTENDING COMMENTS
60F w/ pmhx morbid obesity, AARON, HTN, PE in the past, p/w  acute hypoxic respiratory failure d/t COVID+ pneumonia  and now ARDS. Pt intubated and currently paralyzed and supined. Overnight was attempted proning but had immediate severe desaturation. Placed back supine and stabilized. Remains on 100% fio2 and high peep. Cont LTV ventilation. Cont paralysis and monitor for synchrony.  Cont pressors to maintain map>65. Cont sedation for analgosedation and synchrony. Titrate fio2/peep to sat>90% if possible. Higher peep used for morbid obesity and body habitus causing increased chest wall elastance. Not a candidate for ecmo based on body habitus and BMI. Cont abx for + sputum cx. Cont a/c for VTE hx and covid, monitor PTT and for bleeding. Pt w/ ARTEMIO and no significant improvement despite bumex gtt. Poss fluid removal w/ HD todau. f/u Renal reccs. Remains on insulin gtt for significant hyperglycemia likely in setting of dexamethasone. Prognosis guarded.

## 2020-11-27 NOTE — PROGRESS NOTE ADULT - SUBJECTIVE AND OBJECTIVE BOX
[facility] DIVISION OF KIDNEY DISEASES AND HYPERTENSION -- FOLLOW UP NOTE  --------------------------------------------------------------------------------    24 hour events/subjective: urine output was 640cc/24h.         PAST HISTORY  --------------------------------------------------------------------------------  No significant changes to PMH, PSH, FHx, SHx, unless otherwise noted    ALLERGIES & MEDICATIONS  --------------------------------------------------------------------------------  Allergies    Kiwi (Unknown)  latex (Anaphylaxis)  latex (Unknown)  penicillin (Other)  penicillin (Unknown)  perfume  hives (Other)  potassium acetate (Other)  soap additives hives (Other)    Intolerances      Standing Inpatient Medications  acetylcysteine 20%  Inhalation 4 milliLiter(s) Inhalation three times a day  ALBUTerol    90 MICROgram(s) HFA Inhaler 4 Puff(s) Inhalation every 6 hours  ALBUTerol    90 MICROgram(s) HFA Inhaler 1 Puff(s) Inhalation once  buMETAnide Infusion 2 mG/Hr IV Continuous <Continuous>  cefTRIAXone   IVPB 1000 milliGRAM(s) IV Intermittent every 24 hours  chlorhexidine 0.12% Liquid 15 milliLiter(s) Oral Mucosa every 12 hours  chlorhexidine 4% Liquid 1 Application(s) Topical <User Schedule>  chlorothiazide IVPB 500 milliGRAM(s) IV Intermittent every 12 hours  cisatracurium Infusion 3 MICROgram(s)/kG/Min IV Continuous <Continuous>  dexAMETHasone  Injectable 6 milliGRAM(s) IV Push daily  heparin  Infusion 1500 Unit(s)/Hr IV Continuous <Continuous>  insulin regular Infusion 5 Unit(s)/Hr IV Continuous <Continuous>  ipratropium 17 MICROgram(s) HFA Inhaler 2 Puff(s) Inhalation every 6 hours  metoclopramide Injectable 10 milliGRAM(s) IV Push every 6 hours  midazolam Infusion 0.02 mG/kG/Hr IV Continuous <Continuous>  midodrine 10 milliGRAM(s) Oral every 8 hours  norepinephrine Infusion 0.05 MICROgram(s)/kG/Min IV Continuous <Continuous>  pantoprazole  Injectable 40 milliGRAM(s) IV Push daily  petrolatum Ophthalmic Ointment 1 Application(s) Both EYES two times a day  propofol Infusion 50 MICROgram(s)/kG/Min IV Continuous <Continuous>    PRN Inpatient Medications  acetaminophen    Suspension .. 650 milliGRAM(s) Oral every 6 hours PRN  sodium chloride 0.9% lock flush 10 milliLiter(s) IV Push every 1 hour PRN      REVIEW OF SYSTEMS  --------------------------------------------------------------------------------  Unable to obtain      All other systems were reviewed and are negative, except as noted.    VITALS/PHYSICAL EXAM  --------------------------------------------------------------------------------  T(C): 36.8 (11-27-20 @ 08:00), Max: 38.5 (11-26-20 @ 20:00)  HR: 91 (11-27-20 @ 12:26) (66 - 116)  BP: --  RR: 30 (11-27-20 @ 10:00) (25 - 30)  SpO2: 92% (11-27-20 @ 12:26) (77% - 94%)  Wt(kg): --        11-26-20 @ 07:01  -  11-27-20 @ 07:00  --------------------------------------------------------  IN: 2651.6 mL / OUT: 640 mL / NET: 2011.6 mL    11-27-20 @ 07:01  -  11-27-20 @ 12:53  --------------------------------------------------------  IN: 758.6 mL / OUT: 160 mL / NET: 598.6 mL        Physical Exam:  	Gen: NAD  	HEENT: ETT  	Pulm: CTA B/L, no crackles, on Fio2 100%, PEEP 18   	CV: S1S2  	Abd: Soft, +BS   	Ext: No LE edema B/L  	Neuro: sedated   	Skin: Warm and dry  	Vascular access: central line       LABS/STUDIES  --------------------------------------------------------------------------------              11.1   19.44 >-----------<  172      [11-26-20 @ 23:52]              35.8     142  |  105  |  64  ----------------------------<  136      [11-26-20 @ 23:52]  4.6   |  25  |  3.00        Ca     8.6     [11-26-20 @ 23:52]      Mg     2.6     [11-26-20 @ 23:52]      Phos  5.0     [11-26-20 @ 23:52]      PT/INR: PT 12.2 , INR 1.02       [11-26-20 @ 23:52]  PTT: 69.8       [11-26-20 @ 23:52]          [11-26-20 @ 23:52]    Creatinine Trend:  SCr 3.00 [11-26 @ 23:52]  SCr 2.57 [11-26 @ 00:55]  SCr 2.18 [11-25 @ 00:46]  SCr 1.64 [11-24 @ 01:36]  SCr 0.53 [11-23 @ 03:07]    Urinalysis - [11-26-20 @ 23:57]      Color Yellow / Appearance Slightly Turbid / SG 1.021 / pH 5.5      Gluc Negative / Ketone Negative  / Bili Negative / Urobili Negative       Blood Negative / Protein 30 mg/dL / Leuk Est Moderate / Nitrite Negative      RBC 3 / WBC 8 / Hyaline 4 / Gran  / Sq Epi  / Non Sq Epi 2 / Bacteria Occasional    Urine Protein 40      [11-24-20 @ 13:46]  Urine Sodium <35      [11-26-20 @ 23:56]  Urine Urea Nitrogen 449      [11-24-20 @ 13:46]  Urine Potassium 21      [11-24-20 @ 10:18]  Urine Phosphorus 68.0      [11-24-20 @ 13:46]  Urine Osmolality 353      [11-26-20 @ 23:57]    Ferritin 709      [11-27-20 @ 06:15]  HbA1c 6.6      [04-16-16 @ 06:00]  Lipid: chol --, , HDL --, LDL --      [11-25-20 @ 00:46]    HCV 0.10, Nonreact      [11-19-20 @ 09:32]     [facility] DIVISION OF KIDNEY DISEASES AND HYPERTENSION -- FOLLOW UP NOTE  --------------------------------------------------------------------------------    24 hour events/subjective: urine output was 640cc/24h.         PAST HISTORY  --------------------------------------------------------------------------------  No significant changes to PMH, PSH, FHx, SHx, unless otherwise noted    ALLERGIES & MEDICATIONS  --------------------------------------------------------------------------------  Allergies    Kiwi (Unknown)  latex (Anaphylaxis)  latex (Unknown)  penicillin (Other)  penicillin (Unknown)  perfume  hives (Other)  potassium acetate (Other)  soap additives hives (Other)    Intolerances      Standing Inpatient Medications  acetylcysteine 20%  Inhalation 4 milliLiter(s) Inhalation three times a day  ALBUTerol    90 MICROgram(s) HFA Inhaler 4 Puff(s) Inhalation every 6 hours  ALBUTerol    90 MICROgram(s) HFA Inhaler 1 Puff(s) Inhalation once  buMETAnide Infusion 2 mG/Hr IV Continuous <Continuous>  cefTRIAXone   IVPB 1000 milliGRAM(s) IV Intermittent every 24 hours  chlorhexidine 0.12% Liquid 15 milliLiter(s) Oral Mucosa every 12 hours  chlorhexidine 4% Liquid 1 Application(s) Topical <User Schedule>  chlorothiazide IVPB 500 milliGRAM(s) IV Intermittent every 12 hours  cisatracurium Infusion 3 MICROgram(s)/kG/Min IV Continuous <Continuous>  dexAMETHasone  Injectable 6 milliGRAM(s) IV Push daily  heparin  Infusion 1500 Unit(s)/Hr IV Continuous <Continuous>  insulin regular Infusion 5 Unit(s)/Hr IV Continuous <Continuous>  ipratropium 17 MICROgram(s) HFA Inhaler 2 Puff(s) Inhalation every 6 hours  metoclopramide Injectable 10 milliGRAM(s) IV Push every 6 hours  midazolam Infusion 0.02 mG/kG/Hr IV Continuous <Continuous>  midodrine 10 milliGRAM(s) Oral every 8 hours  norepinephrine Infusion 0.05 MICROgram(s)/kG/Min IV Continuous <Continuous>  pantoprazole  Injectable 40 milliGRAM(s) IV Push daily  petrolatum Ophthalmic Ointment 1 Application(s) Both EYES two times a day  propofol Infusion 50 MICROgram(s)/kG/Min IV Continuous <Continuous>    PRN Inpatient Medications  acetaminophen    Suspension .. 650 milliGRAM(s) Oral every 6 hours PRN  sodium chloride 0.9% lock flush 10 milliLiter(s) IV Push every 1 hour PRN      REVIEW OF SYSTEMS  --------------------------------------------------------------------------------  Unable to obtain      All other systems were reviewed and are negative, except as noted.    VITALS/PHYSICAL EXAM  --------------------------------------------------------------------------------  T(C): 36.8 (11-27-20 @ 08:00), Max: 38.5 (11-26-20 @ 20:00)  HR: 91 (11-27-20 @ 12:26) (66 - 116)  BP: --  RR: 30 (11-27-20 @ 10:00) (25 - 30)  SpO2: 92% (11-27-20 @ 12:26) (77% - 94%)  Wt(kg): --        11-26-20 @ 07:01  -  11-27-20 @ 07:00  --------------------------------------------------------  IN: 2651.6 mL / OUT: 640 mL / NET: 2011.6 mL    11-27-20 @ 07:01  -  11-27-20 @ 12:53  --------------------------------------------------------  IN: 758.6 mL / OUT: 160 mL / NET: 598.6 mL        Physical Exam:  	Gen: NAD  	HEENT: ETT  	Pulm: CTA B/L, no crackles, on Fio2 100%, PEEP 18   	CV: S1S2  	Abd: Soft, +BS               : negro catheter in situe  	Ext: No LE edema B/L  	Neuro: sedated   	Skin: Warm and dry  	Vascular access: central line               Psyc: unable to assess      LABS/STUDIES  --------------------------------------------------------------------------------              11.1   19.44 >-----------<  172      [11-26-20 @ 23:52]              35.8     142  |  105  |  64  ----------------------------<  136      [11-26-20 @ 23:52]  4.6   |  25  |  3.00        Ca     8.6     [11-26-20 @ 23:52]      Mg     2.6     [11-26-20 @ 23:52]      Phos  5.0     [11-26-20 @ 23:52]      PT/INR: PT 12.2 , INR 1.02       [11-26-20 @ 23:52]  PTT: 69.8       [11-26-20 @ 23:52]          [11-26-20 @ 23:52]    Creatinine Trend:  SCr 3.00 [11-26 @ 23:52]  SCr 2.57 [11-26 @ 00:55]  SCr 2.18 [11-25 @ 00:46]  SCr 1.64 [11-24 @ 01:36]  SCr 0.53 [11-23 @ 03:07]    Urinalysis - [11-26-20 @ 23:57]      Color Yellow / Appearance Slightly Turbid / SG 1.021 / pH 5.5      Gluc Negative / Ketone Negative  / Bili Negative / Urobili Negative       Blood Negative / Protein 30 mg/dL / Leuk Est Moderate / Nitrite Negative      RBC 3 / WBC 8 / Hyaline 4 / Gran  / Sq Epi  / Non Sq Epi 2 / Bacteria Occasional    Urine Protein 40      [11-24-20 @ 13:46]  Urine Sodium <35      [11-26-20 @ 23:56]  Urine Urea Nitrogen 449      [11-24-20 @ 13:46]  Urine Potassium 21      [11-24-20 @ 10:18]  Urine Phosphorus 68.0      [11-24-20 @ 13:46]  Urine Osmolality 353      [11-26-20 @ 23:57]    Ferritin 709      [11-27-20 @ 06:15]  HbA1c 6.6      [04-16-16 @ 06:00]  Lipid: chol --, , HDL --, LDL --      [11-25-20 @ 00:46]    HCV 0.10, Nonreact      [11-19-20 @ 09:32]

## 2020-11-27 NOTE — PROGRESS NOTE ADULT - ASSESSMENT
Pt is a 61 y/o F w PMH of AARON, HTN, HLD, prior DVT/PE hx, asthma presented to [facility] for SOB and productive cough prior to admission. Admitted for acute hypoxic respiratory failure 2/2 to COVID infection. Was subsequently intubated and proned on 11/23//20. Was transferred to ICU for further management. Was started on pressors and nimbex drip. Nephrology consulted for ARTEMIO.

## 2020-11-28 LAB
-  CANDIDA ALBICANS: SIGNIFICANT CHANGE UP
-  CANDIDA GLABRATA: SIGNIFICANT CHANGE UP
-  CANDIDA KRUSEI: SIGNIFICANT CHANGE UP
-  CANDIDA PARAPSILOSIS: SIGNIFICANT CHANGE UP
-  CANDIDA TROPICALIS: SIGNIFICANT CHANGE UP
-  COAGULASE NEGATIVE STAPHYLOCOCCUS: SIGNIFICANT CHANGE UP
-  K. PNEUMONIAE GROUP: SIGNIFICANT CHANGE UP
-  KPC RESISTANCE GENE: SIGNIFICANT CHANGE UP
-  STREPTOCOCCUS SP. (NOT GRP A, B OR S PNEUMONIAE): SIGNIFICANT CHANGE UP
A BAUMANNII DNA SPEC QL NAA+PROBE: SIGNIFICANT CHANGE UP
ANION GAP SERPL CALC-SCNC: 12 MMOL/L — SIGNIFICANT CHANGE UP (ref 5–17)
ANION GAP SERPL CALC-SCNC: 13 MMOL/L — SIGNIFICANT CHANGE UP (ref 5–17)
ANION GAP SERPL CALC-SCNC: 13 MMOL/L — SIGNIFICANT CHANGE UP (ref 5–17)
ANION GAP SERPL CALC-SCNC: 14 MMOL/L — SIGNIFICANT CHANGE UP (ref 5–17)
ANION GAP SERPL CALC-SCNC: 14 MMOL/L — SIGNIFICANT CHANGE UP (ref 5–17)
APTT BLD: 45.5 SEC — HIGH (ref 27.5–35.5)
APTT BLD: 46.8 SEC — HIGH (ref 27.5–35.5)
APTT BLD: 48.8 SEC — HIGH (ref 27.5–35.5)
APTT BLD: 53.5 SEC — HIGH (ref 27.5–35.5)
BUN SERPL-MCNC: 25 MG/DL — HIGH (ref 7–23)
BUN SERPL-MCNC: 28 MG/DL — HIGH (ref 7–23)
BUN SERPL-MCNC: 33 MG/DL — HIGH (ref 7–23)
BUN SERPL-MCNC: 40 MG/DL — HIGH (ref 7–23)
BUN SERPL-MCNC: 48 MG/DL — HIGH (ref 7–23)
CALCIUM SERPL-MCNC: 8.2 MG/DL — LOW (ref 8.4–10.5)
CALCIUM SERPL-MCNC: 8.3 MG/DL — LOW (ref 8.4–10.5)
CALCIUM SERPL-MCNC: 8.3 MG/DL — LOW (ref 8.4–10.5)
CALCIUM SERPL-MCNC: 8.4 MG/DL — SIGNIFICANT CHANGE UP (ref 8.4–10.5)
CALCIUM SERPL-MCNC: 8.4 MG/DL — SIGNIFICANT CHANGE UP (ref 8.4–10.5)
CHLORIDE SERPL-SCNC: 101 MMOL/L — SIGNIFICANT CHANGE UP (ref 96–108)
CHLORIDE SERPL-SCNC: 96 MMOL/L — SIGNIFICANT CHANGE UP (ref 96–108)
CHLORIDE SERPL-SCNC: 98 MMOL/L — SIGNIFICANT CHANGE UP (ref 96–108)
CHLORIDE SERPL-SCNC: 99 MMOL/L — SIGNIFICANT CHANGE UP (ref 96–108)
CHLORIDE SERPL-SCNC: 99 MMOL/L — SIGNIFICANT CHANGE UP (ref 96–108)
CO2 SERPL-SCNC: 20 MMOL/L — LOW (ref 22–31)
CO2 SERPL-SCNC: 22 MMOL/L — SIGNIFICANT CHANGE UP (ref 22–31)
CO2 SERPL-SCNC: 23 MMOL/L — SIGNIFICANT CHANGE UP (ref 22–31)
CREAT SERPL-MCNC: 1.22 MG/DL — SIGNIFICANT CHANGE UP (ref 0.5–1.3)
CREAT SERPL-MCNC: 1.34 MG/DL — HIGH (ref 0.5–1.3)
CREAT SERPL-MCNC: 1.55 MG/DL — HIGH (ref 0.5–1.3)
CREAT SERPL-MCNC: 1.86 MG/DL — HIGH (ref 0.5–1.3)
CREAT SERPL-MCNC: 2.35 MG/DL — HIGH (ref 0.5–1.3)
CRP SERPL-MCNC: 30.15 MG/DL — HIGH (ref 0–0.4)
D DIMER BLD IA.RAPID-MCNC: 2256 NG/ML DDU — HIGH
E CLOAC COMP DNA BLD POS QL NAA+PROBE: SIGNIFICANT CHANGE UP
E COLI DNA BLD POS QL NAA+NON-PROBE: SIGNIFICANT CHANGE UP
ENTEROCOC DNA BLD POS QL NAA+NON-PROBE: SIGNIFICANT CHANGE UP
ENTEROCOC DNA BLD POS QL NAA+NON-PROBE: SIGNIFICANT CHANGE UP
GAS PNL BLDA: SIGNIFICANT CHANGE UP
GLUCOSE BLDC GLUCOMTR-MCNC: 136 MG/DL — HIGH (ref 70–99)
GLUCOSE BLDC GLUCOMTR-MCNC: 136 MG/DL — HIGH (ref 70–99)
GLUCOSE BLDC GLUCOMTR-MCNC: 137 MG/DL — HIGH (ref 70–99)
GLUCOSE BLDC GLUCOMTR-MCNC: 137 MG/DL — HIGH (ref 70–99)
GLUCOSE BLDC GLUCOMTR-MCNC: 141 MG/DL — HIGH (ref 70–99)
GLUCOSE BLDC GLUCOMTR-MCNC: 142 MG/DL — HIGH (ref 70–99)
GLUCOSE BLDC GLUCOMTR-MCNC: 148 MG/DL — HIGH (ref 70–99)
GLUCOSE BLDC GLUCOMTR-MCNC: 148 MG/DL — HIGH (ref 70–99)
GLUCOSE BLDC GLUCOMTR-MCNC: 150 MG/DL — HIGH (ref 70–99)
GLUCOSE BLDC GLUCOMTR-MCNC: 150 MG/DL — HIGH (ref 70–99)
GLUCOSE BLDC GLUCOMTR-MCNC: 162 MG/DL — HIGH (ref 70–99)
GLUCOSE BLDC GLUCOMTR-MCNC: 167 MG/DL — HIGH (ref 70–99)
GLUCOSE BLDC GLUCOMTR-MCNC: 167 MG/DL — HIGH (ref 70–99)
GLUCOSE BLDC GLUCOMTR-MCNC: 168 MG/DL — HIGH (ref 70–99)
GLUCOSE BLDC GLUCOMTR-MCNC: 173 MG/DL — HIGH (ref 70–99)
GLUCOSE BLDC GLUCOMTR-MCNC: 186 MG/DL — HIGH (ref 70–99)
GLUCOSE BLDC GLUCOMTR-MCNC: 187 MG/DL — HIGH (ref 70–99)
GLUCOSE BLDC GLUCOMTR-MCNC: 188 MG/DL — HIGH (ref 70–99)
GLUCOSE BLDC GLUCOMTR-MCNC: 194 MG/DL — HIGH (ref 70–99)
GLUCOSE BLDC GLUCOMTR-MCNC: 212 MG/DL — HIGH (ref 70–99)
GLUCOSE BLDC GLUCOMTR-MCNC: 223 MG/DL — HIGH (ref 70–99)
GLUCOSE SERPL-MCNC: 155 MG/DL — HIGH (ref 70–99)
GLUCOSE SERPL-MCNC: 155 MG/DL — HIGH (ref 70–99)
GLUCOSE SERPL-MCNC: 165 MG/DL — HIGH (ref 70–99)
GLUCOSE SERPL-MCNC: 205 MG/DL — HIGH (ref 70–99)
GLUCOSE SERPL-MCNC: 208 MG/DL — HIGH (ref 70–99)
GP B STREP DNA BLD POS QL NAA+NON-PROBE: SIGNIFICANT CHANGE UP
GRAM STN FLD: SIGNIFICANT CHANGE UP
GRAM STN FLD: SIGNIFICANT CHANGE UP
HAEM INFLU DNA BLD POS QL NAA+NON-PROBE: SIGNIFICANT CHANGE UP
HCT VFR BLD CALC: 35.5 % — SIGNIFICANT CHANGE UP (ref 34.5–45)
HCT VFR BLD CALC: 36.9 % — SIGNIFICANT CHANGE UP (ref 34.5–45)
HGB BLD-MCNC: 10.4 G/DL — LOW (ref 11.5–15.5)
HGB BLD-MCNC: 11 G/DL — LOW (ref 11.5–15.5)
INR BLD: 0.94 RATIO — SIGNIFICANT CHANGE UP (ref 0.88–1.16)
INR BLD: 1.08 RATIO — SIGNIFICANT CHANGE UP (ref 0.88–1.16)
K OXYTOCA DNA BLD POS QL NAA+NON-PROBE: SIGNIFICANT CHANGE UP
L MONOCYTOG DNA BLD POS QL NAA+NON-PROBE: SIGNIFICANT CHANGE UP
LDH SERPL L TO P-CCNC: 313 U/L — HIGH (ref 50–242)
MAGNESIUM SERPL-MCNC: 2.6 MG/DL — SIGNIFICANT CHANGE UP (ref 1.6–2.6)
MCHC RBC-ENTMCNC: 27.4 PG — SIGNIFICANT CHANGE UP (ref 27–34)
MCHC RBC-ENTMCNC: 27.7 PG — SIGNIFICANT CHANGE UP (ref 27–34)
MCHC RBC-ENTMCNC: 29.3 GM/DL — LOW (ref 32–36)
MCHC RBC-ENTMCNC: 29.8 GM/DL — LOW (ref 32–36)
MCV RBC AUTO: 92.9 FL — SIGNIFICANT CHANGE UP (ref 80–100)
MCV RBC AUTO: 93.4 FL — SIGNIFICANT CHANGE UP (ref 80–100)
METHOD TYPE: SIGNIFICANT CHANGE UP
MRSA SPEC QL CULT: SIGNIFICANT CHANGE UP
MSSA DNA SPEC QL NAA+PROBE: SIGNIFICANT CHANGE UP
N MEN ISLT CULT: SIGNIFICANT CHANGE UP
NRBC # BLD: 0 /100 WBCS — SIGNIFICANT CHANGE UP (ref 0–0)
NRBC # BLD: 0 /100 WBCS — SIGNIFICANT CHANGE UP (ref 0–0)
P AERUGINOSA DNA BLD POS NAA+NON-PROBE: SIGNIFICANT CHANGE UP
PHOSPHATE SERPL-MCNC: 3.3 MG/DL — SIGNIFICANT CHANGE UP (ref 2.5–4.5)
PHOSPHATE SERPL-MCNC: 3.7 MG/DL — SIGNIFICANT CHANGE UP (ref 2.5–4.5)
PHOSPHATE SERPL-MCNC: 3.9 MG/DL — SIGNIFICANT CHANGE UP (ref 2.5–4.5)
PHOSPHATE SERPL-MCNC: 4.2 MG/DL — SIGNIFICANT CHANGE UP (ref 2.5–4.5)
PHOSPHATE SERPL-MCNC: 4.7 MG/DL — HIGH (ref 2.5–4.5)
PLATELET # BLD AUTO: 151 K/UL — SIGNIFICANT CHANGE UP (ref 150–400)
PLATELET # BLD AUTO: 156 K/UL — SIGNIFICANT CHANGE UP (ref 150–400)
POTASSIUM SERPL-MCNC: 4.9 MMOL/L — SIGNIFICANT CHANGE UP (ref 3.5–5.3)
POTASSIUM SERPL-MCNC: 5.1 MMOL/L — SIGNIFICANT CHANGE UP (ref 3.5–5.3)
POTASSIUM SERPL-MCNC: 5.2 MMOL/L — SIGNIFICANT CHANGE UP (ref 3.5–5.3)
POTASSIUM SERPL-SCNC: 4.9 MMOL/L — SIGNIFICANT CHANGE UP (ref 3.5–5.3)
POTASSIUM SERPL-SCNC: 5.1 MMOL/L — SIGNIFICANT CHANGE UP (ref 3.5–5.3)
POTASSIUM SERPL-SCNC: 5.2 MMOL/L — SIGNIFICANT CHANGE UP (ref 3.5–5.3)
PROTHROM AB SERPL-ACNC: 11.3 SEC — SIGNIFICANT CHANGE UP (ref 10.6–13.6)
PROTHROM AB SERPL-ACNC: 12.9 SEC — SIGNIFICANT CHANGE UP (ref 10.6–13.6)
RBC # BLD: 3.8 M/UL — SIGNIFICANT CHANGE UP (ref 3.8–5.2)
RBC # BLD: 3.97 M/UL — SIGNIFICANT CHANGE UP (ref 3.8–5.2)
RBC # FLD: 17.3 % — HIGH (ref 10.3–14.5)
RBC # FLD: 17.3 % — HIGH (ref 10.3–14.5)
S MARCESCENS DNA BLD POS NAA+NON-PROBE: SIGNIFICANT CHANGE UP
S PNEUM DNA BLD POS QL NAA+NON-PROBE: SIGNIFICANT CHANGE UP
S PYO DNA BLD POS QL NAA+NON-PROBE: SIGNIFICANT CHANGE UP
SODIUM SERPL-SCNC: 129 MMOL/L — LOW (ref 135–145)
SODIUM SERPL-SCNC: 134 MMOL/L — LOW (ref 135–145)
SODIUM SERPL-SCNC: 134 MMOL/L — LOW (ref 135–145)
SODIUM SERPL-SCNC: 136 MMOL/L — SIGNIFICANT CHANGE UP (ref 135–145)
SODIUM SERPL-SCNC: 137 MMOL/L — SIGNIFICANT CHANGE UP (ref 135–145)
WBC # BLD: 24.33 K/UL — HIGH (ref 3.8–10.5)
WBC # BLD: 26.44 K/UL — HIGH (ref 3.8–10.5)
WBC # FLD AUTO: 24.33 K/UL — HIGH (ref 3.8–10.5)
WBC # FLD AUTO: 26.44 K/UL — HIGH (ref 3.8–10.5)

## 2020-11-28 PROCEDURE — 71045 X-RAY EXAM CHEST 1 VIEW: CPT | Mod: 26

## 2020-11-28 PROCEDURE — 93010 ELECTROCARDIOGRAM REPORT: CPT | Mod: 59

## 2020-11-28 PROCEDURE — 99232 SBSQ HOSP IP/OBS MODERATE 35: CPT | Mod: GC

## 2020-11-28 PROCEDURE — 99291 CRITICAL CARE FIRST HOUR: CPT

## 2020-11-28 RX ORDER — HEPARIN SODIUM 5000 [USP'U]/ML
1700 INJECTION INTRAVENOUS; SUBCUTANEOUS
Qty: 25000 | Refills: 0 | Status: DISCONTINUED | OUTPATIENT
Start: 2020-11-28 | End: 2020-11-28

## 2020-11-28 RX ORDER — MEROPENEM 1 G/30ML
1000 INJECTION INTRAVENOUS EVERY 12 HOURS
Refills: 0 | Status: DISCONTINUED | OUTPATIENT
Start: 2020-11-28 | End: 2020-11-30

## 2020-11-28 RX ORDER — HEPARIN SODIUM 5000 [USP'U]/ML
1800 INJECTION INTRAVENOUS; SUBCUTANEOUS
Qty: 25000 | Refills: 0 | Status: DISCONTINUED | OUTPATIENT
Start: 2020-11-28 | End: 2020-11-28

## 2020-11-28 RX ORDER — DEXAMETHASONE 0.5 MG/5ML
6 ELIXIR ORAL DAILY
Refills: 0 | Status: DISCONTINUED | OUTPATIENT
Start: 2020-11-28 | End: 2020-11-28

## 2020-11-28 RX ADMIN — MEROPENEM 100 MILLIGRAM(S): 1 INJECTION INTRAVENOUS at 15:19

## 2020-11-28 RX ADMIN — CHLORHEXIDINE GLUCONATE 15 MILLILITER(S): 213 SOLUTION TOPICAL at 17:17

## 2020-11-28 RX ADMIN — Medication 2 PUFF(S): at 17:23

## 2020-11-28 RX ADMIN — PANTOPRAZOLE SODIUM 40 MILLIGRAM(S): 20 TABLET, DELAYED RELEASE ORAL at 11:22

## 2020-11-28 RX ADMIN — CHLORHEXIDINE GLUCONATE 1 APPLICATION(S): 213 SOLUTION TOPICAL at 05:25

## 2020-11-28 RX ADMIN — Medication 100 MILLIGRAM(S): at 05:29

## 2020-11-28 RX ADMIN — CHLORHEXIDINE GLUCONATE 15 MILLILITER(S): 213 SOLUTION TOPICAL at 05:24

## 2020-11-28 RX ADMIN — Medication 12.8 MICROGRAM(S)/KG/MIN: at 21:07

## 2020-11-28 RX ADMIN — MIDODRINE HYDROCHLORIDE 10 MILLIGRAM(S): 2.5 TABLET ORAL at 21:08

## 2020-11-28 RX ADMIN — Medication 10 MILLIGRAM(S): at 12:30

## 2020-11-28 RX ADMIN — Medication 2 PUFF(S): at 06:11

## 2020-11-28 RX ADMIN — PROPOFOL 40.8 MICROGRAM(S)/KG/MIN: 10 INJECTION, EMULSION INTRAVENOUS at 21:05

## 2020-11-28 RX ADMIN — Medication 10 MILLIGRAM(S): at 17:17

## 2020-11-28 RX ADMIN — Medication 250 MILLIGRAM(S): at 21:08

## 2020-11-28 RX ADMIN — Medication 2 PUFF(S): at 01:24

## 2020-11-28 RX ADMIN — CISATRACURIUM BESYLATE 24.5 MICROGRAM(S)/KG/MIN: 2 INJECTION INTRAVENOUS at 12:29

## 2020-11-28 RX ADMIN — ALBUTEROL 4 PUFF(S): 90 AEROSOL, METERED ORAL at 06:11

## 2020-11-28 RX ADMIN — HEPARIN SODIUM 18 UNIT(S)/HR: 5000 INJECTION INTRAVENOUS; SUBCUTANEOUS at 21:05

## 2020-11-28 RX ADMIN — ALBUTEROL 4 PUFF(S): 90 AEROSOL, METERED ORAL at 01:24

## 2020-11-28 RX ADMIN — Medication 10 MILLIGRAM(S): at 00:20

## 2020-11-28 RX ADMIN — MIDAZOLAM HYDROCHLORIDE 2.72 MG/KG/HR: 1 INJECTION, SOLUTION INTRAMUSCULAR; INTRAVENOUS at 21:05

## 2020-11-28 RX ADMIN — ALBUTEROL 4 PUFF(S): 90 AEROSOL, METERED ORAL at 12:45

## 2020-11-28 RX ADMIN — MIDODRINE HYDROCHLORIDE 10 MILLIGRAM(S): 2.5 TABLET ORAL at 14:13

## 2020-11-28 RX ADMIN — Medication 2 PUFF(S): at 12:45

## 2020-11-28 RX ADMIN — Medication 1 APPLICATION(S): at 05:26

## 2020-11-28 RX ADMIN — INSULIN HUMAN 6 UNIT(S)/HR: 100 INJECTION, SOLUTION SUBCUTANEOUS at 21:06

## 2020-11-28 RX ADMIN — CISATRACURIUM BESYLATE 24.5 MICROGRAM(S)/KG/MIN: 2 INJECTION INTRAVENOUS at 21:06

## 2020-11-28 RX ADMIN — MIDODRINE HYDROCHLORIDE 10 MILLIGRAM(S): 2.5 TABLET ORAL at 05:24

## 2020-11-28 RX ADMIN — Medication 10 MILLIGRAM(S): at 23:00

## 2020-11-28 RX ADMIN — Medication 10 MILLIGRAM(S): at 05:24

## 2020-11-28 RX ADMIN — MIDAZOLAM HYDROCHLORIDE 2.72 MG/KG/HR: 1 INJECTION, SOLUTION INTRAMUSCULAR; INTRAVENOUS at 07:59

## 2020-11-28 RX ADMIN — Medication 6 MILLIGRAM(S): at 05:24

## 2020-11-28 RX ADMIN — Medication 1 APPLICATION(S): at 17:18

## 2020-11-28 RX ADMIN — Medication 250 MILLIGRAM(S): at 10:19

## 2020-11-28 RX ADMIN — ALBUTEROL 4 PUFF(S): 90 AEROSOL, METERED ORAL at 17:23

## 2020-11-28 NOTE — PROCEDURE NOTE - NSPOSTPRCRAD_GEN_A_CORE
post-procedure radiography performed central line located in the superior vena cava/no pneumothorax/post-procedure radiography performed

## 2020-11-28 NOTE — PROGRESS NOTE ADULT - ASSESSMENT
Pt is a 59 y/o F w PMH of AARON, HTN, HLD, prior DVT/PE hx, asthma presented to [facility] for SOB and productive cough prior to admission. Admitted for acute hypoxic respiratory failure 2/2 to COVID infection. Was subsequently intubated and proned on 11/23//20. Was transferred to ICU for further management. Was started on pressors and nimbex drip. Nephrology consulted for ARTEMIO. Pt is a 59 y/o F w PMH of AARON, HTN, HLD, prior DVT/PE hx, asthma presented to [facility] for SOB and productive cough prior to admission. Admitted for acute hypoxic respiratory failure 2/2 to COVID infection. Was subsequently intubated and proned on 11/23//20. Was transferred to ICU for further management. Was started on pressors and nimbex drip. Nephrology consulted for [place]

## 2020-11-28 NOTE — PROGRESS NOTE ADULT - ASSESSMENT
59 y/o AARON, htn, DVT, PE, asthma? admitted to [facility] for cough, SOB, diarrhea found to have covid19, hospital course c/b Acute Hypoxic resp failure d/t to covid19 pna, intubated on 11/22/2020 and transfer to MICU. Patient with worsening covid PNA, new Staph PNA, (sputum with Staph), bacteremia, worsening PF ratio, attempted to prone 11/27 night with no success as patient did not tolerate, needed to be resuspined shortly after due to hypoxia. Renal fx continue to worsen, urine output low, with no significant improvement with diuresis. On 11/28 patient was started on CVVHD. All acces lines were also rotated.    -Neuro: intubated paralyzed and sedated cont prop/versed and nimbex for vent synchrony, fentanyl stopped 11/26 for concerns of increased risk of ileus, continue full sedation as pt still with high ventilator requirment  -Cardiac: Distributive shock cont levo to maintain MAP >65 titrated as tolerated, continue midodrine   -Resp: Acute hypoxic resp failure 2/2 to covid19 cont PRVC vent setting, overnight with worsening hypoxia, patient attempted to be proned, did not tolerate, due to worsening hypoxia, resupined shortly after, hypoxia improvement since CVVHD started,  continue to titrate as tolerated to maintain o2 >88%, cont pulm toilet,   -GI: NPO, increase trickle feeds with nepro if tolerates, monitor residuals (last 400cc), continue reglan, GI ppx w/ protonix  -Renal: ARTEMIO worsened, low urine output yesterday despite diuretics, patient started yesterday on CVVHD via R IJ shiley, uo improved, tolerating well, nephrology following,  cont to monitor I&O closely, cont to trend and replete lytes prn  -ID: Covid19 now s/p remdesivir, cont dexa, on 11/26 Ceftriaxone started as pt with sputum Staph, repeat blood cultures positive with gram negative rods and gram positive cocci in clusters ABX escalated to Vanco 11/27 and Meropenem started 11/28  -Heme: Cont full dose AC given high risk for DVT/PE, heparin gtt increased to 1700 as ptt subtherapeutic, w/in parameters, Hb stable, continue to monitor  -Endo: DM2 a1c 7.5 w/ persistent hyperglycemia, still requires freddy amount of insulin, continue insulin gtt and monitor FS   -Dispo: pt critically ill, remains in MICU, GOC ongoing pt is full code, prognosis guarded.

## 2020-11-28 NOTE — PROGRESS NOTE ADULT - PROBLEM SELECTOR PLAN 1
Pt with ARTEMIO in the setting of septic shock 2/2 to COVID infection. Now likely in ATN. Noted to have Scr of 0.53 on 11/23/20, then Scr further increased to 1.64 on 11/24/20, 2.18 on 11/25/20 and then further to 2.57 on 11/26/20 and 3.0 on 11/27. Patient was started on CRRT and patient remains non-oliguric. Started on bumex drip. Lastest UA with mod leuk est, 8WBC and 3RBC. Urine Na on 11/26/20 w urine Na of < 35 suggestive of pre-renal etiology, however cr trend suggestive of ATN. Agree with bumex drip, in attempt to improve respiratory status WITH CRRT to augment fluid removal. Monitor labs and urine output. Avoid NSAIDs, ACEI/ARBS, RCA and nephrotoxins. Dose medications as per eGFR

## 2020-11-28 NOTE — PROGRESS NOTE ADULT - SUBJECTIVE AND OBJECTIVE BOX
INTERVAL HPI: 59 y/o AARON, htn, DVT, PE, asthma? admitted to [facility] for cough, SOB, diarrhea found to have covid19, hospital course c/b Acute Hypoxic resp failure d/t to covid19 pna, intubated on 2020 and transfer to MICU. Patient with worsening covid PNA, new Staph PNA, (sputum with Staph), bacteremia, worsening PF ratio, failed proning  night. Renal fx worsened, urine output low, with no significant improvement with diuresis. On  patient was started on CVVHD. All acces lines were also rotated.    Over night events: Patient able to tolerate CVVHD, Hypoxemia improving.     Review of Systems: Unable to obtain 2/2 sedation      SUBJECTIVE: Patient seen and examined at bedside.       VITAL SIGNS:  ICU Vital Signs Last 24 Hrs  T(C): 37.2 (2020 12:00), Max: 37.6 (2020 16:00)  T(F): 99 (2020 12:00), Max: 99.7 (2020 16:00)  HR: 96 (2020 14:00) (85 - 102)  BP: --  BP(mean): --  ABP: 160/52 (2020 14:30) (100/50 - 172/54)  ABP(mean): 76 (2020 14:30) (56 - 96)  RR: 30 (2020 14:00) (11 - 31)  SpO2: 95% (2020 14:00) (86% - 96%)    Mode: AC/ CMV (Assist Control/ Continuous Mandatory Ventilation), RR (machine): 30, TV (machine): 350, FiO2: 100, PEEP: 18, ITime: 1, MAP: 25, PIP: 42  Plateau pressure: 43  P/F ratio: 80 improved to 94 at 10 AM     @ :  -   @ 07:00  --------------------------------------------------------  IN: 3530.4 mL / OUT: 3182 mL / NET: 348.4 mL     @ 07: @ 15:02  --------------------------------------------------------  IN: 1484 mL / OUT: 2055 mL / NET: -571 mL      CAPILLARY BLOOD GLUCOSE      POCT Blood Glucose.: 187 mg/dL (2020 14:05)    ECG:< from: 12 Lead ECG (20 @ 10:39) >  Systolic  mmHg    Diastolic BP 50 mmHg    Ventricular Rate 62 BPM    Atrial Rate 62 BPM    P-R Interval 148 ms    QRS Duration 94 ms    Q-T Interval 446 ms    QTC Calculation(Bazett) 452 ms    P Axis 16 degrees    R Axis 68 degrees    T Axis 22 degrees    Diagnosis Line NORMAL SINUS RHYTHM  NORMAL ECG  WHEN COMPARED WITH ECG OF 2020  NO SIGNIFICANT CHANGE WAS FOUND  Confirmed by NOAH DUMONT MD ([id number]) on 2020 12:10:36 PM    < end of copied text >      PHYSICAL EXAM:  Gen: Comfortable in bed in NAD, resupined  Neuro: sedated, paralyzed and intubated  Resp: decrease air entry b/l  CVS: +RRR  Abd: BSx4, soft, ND, large pannus  Ext: +2 edema   Skin: warm/dry    MEDICATIONS:  MEDICATIONS  (STANDING):  ALBUTerol    90 MICROgram(s) HFA Inhaler 4 Puff(s) Inhalation every 6 hours  chlorhexidine 0.12% Liquid 15 milliLiter(s) Oral Mucosa every 12 hours  chlorhexidine 4% Liquid 1 Application(s) Topical <User Schedule>  chlorhexidine 4% Liquid 1 Application(s) Topical <User Schedule>  cisatracurium Infusion 3 MICROgram(s)/kG/Min (24.5 mL/Hr) IV Continuous <Continuous>  CRRT Treatment    <Continuous>  dexAMETHasone  Injectable 6 milliGRAM(s) IV Push daily  heparin  Infusion 1700 Unit(s)/Hr (17 mL/Hr) IV Continuous <Continuous>  insulin regular Infusion 6 Unit(s)/Hr (6 mL/Hr) IV Continuous <Continuous>  ipratropium 17 MICROgram(s) HFA Inhaler 2 Puff(s) Inhalation every 6 hours  meropenem  IVPB 1000 milliGRAM(s) IV Intermittent every 12 hours  metoclopramide Injectable 10 milliGRAM(s) IV Push every 6 hours  midazolam Infusion 0.02 mG/kG/Hr (2.72 mL/Hr) IV Continuous <Continuous>  midodrine 10 milliGRAM(s) Oral every 8 hours  norepinephrine Infusion 0.05 MICROgram(s)/kG/Min (12.8 mL/Hr) IV Continuous <Continuous>  pantoprazole  Injectable 40 milliGRAM(s) IV Push daily  petrolatum Ophthalmic Ointment 1 Application(s) Both EYES two times a day  PrismaSATE Dialysate BGK 4 / 2.5 5000 milliLiter(s) (3000 mL/Hr) CRRT <Continuous>  PrismaSOL Filtration BGK 4 / 2.5 5000 milliLiter(s) (1000 mL/Hr) CRRT <Continuous>  PrismaSOL Filtration BGK 4 / 2.5 5000 milliLiter(s) (200 mL/Hr) CRRT <Continuous>  propofol Infusion 50 MICROgram(s)/kG/Min (40.8 mL/Hr) IV Continuous <Continuous>  vancomycin  IVPB 2000 milliGRAM(s) IV Intermittent every 12 hours  vancomycin  IVPB        MEDICATIONS  (PRN):  acetaminophen    Suspension .. 650 milliGRAM(s) Oral every 6 hours PRN Temp greater or equal to 38C (100.4F)  sodium chloride 0.9% lock flush 10 milliLiter(s) IV Push every 1 hour PRN Pre/post blood products, medications, blood draw, and to maintain line patency      ALLERGIES:  Allergies    Kiwi (Unknown)  latex (Anaphylaxis)  latex (Unknown)  penicillin (Other)  penicillin (Unknown)  perfume  hives (Other)  potassium acetate (Other)  soap additives hives (Other)    Intolerances        LABS:                        10.4   26.44 )-----------( 151      ( 2020 10:19 )             35.5     11-28    134<L>  |  99  |  33<H>  ----------------------------<  165<H>  5.1   |  23  |  1.55<H>    Ca    8.2<L>      2020 10:20  Phos  3.9     11-28  Mg     2.6     -      PT/INR - ( 2020 00:46 )   PT: 12.9 sec;   INR: 1.08 ratio         PTT - ( 2020 10:20 )  PTT:45.5 sec  Urinalysis Basic - ( 2020 23:57 )    Color: Yellow / Appearance: Slightly Turbid / S.021 / pH: x  Gluc: x / Ketone: Negative  / Bili: Negative / Urobili: Negative   Blood: x / Protein: 30 mg/dL / Nitrite: Negative   Leuk Esterase: Moderate / RBC: 3 /hpf / WBC 8 /HPF   Sq Epi: x / Non Sq Epi: 2 / Bacteria: Occasional        RADIOLOGY & ADDITIONAL TESTS: Reviewed.

## 2020-11-28 NOTE — PROGRESS NOTE ADULT - ATTENDING COMMENTS
60F w/ pmhx morbid obesity, AARON, HTN, PE in the past, p/w  acute hypoxic respiratory failure d/t COVID+ pneumonia  and now ARDS. Pt intubated and currently paralyzed and supined. Prior attempt at proning  had immediate severe desaturation. Placed back supine and stabilized. Remains on 100% fio2 and high peep. Cont LTV ventilation. Cont paralysis and monitor for synchrony.  Cont pressors to maintain map>65.  Titrate fio2/peep to sat>90% if possible. Higher peep used for morbid obesity and body habitus causing increased chest wall elastance. Not a candidate for ecmo based on body habitus and BMI. Severe ARTEMIO now on cvvh w/ fluid removal and pt with improvement in oxygen saturation. f/u renal reccs.   Patient w/ new staph and GNR bacteremia. Cont vanc and zosyn for cvvh dosing but can probably change to just zosyn.    Cont a/c for VTE hx and covid, monitor PTT and for bleeding.  Remains on insulin gtt for significant hyperglycemia likely in setting of dexamethasone. Prognosis guarded.

## 2020-11-28 NOTE — PROCEDURE NOTE - NSINFORMCONSENT_GEN_A_CORE
Patient's  and son via telephone./Benefits, risks, and possible complications of procedure explained to patient/caregiver who verbalized understanding and gave verbal consent.

## 2020-11-29 LAB
-  AMPICILLIN/SULBACTAM: SIGNIFICANT CHANGE UP
-  CEFAZOLIN: SIGNIFICANT CHANGE UP
-  CLINDAMYCIN: SIGNIFICANT CHANGE UP
-  ERYTHROMYCIN: SIGNIFICANT CHANGE UP
-  GENTAMICIN: SIGNIFICANT CHANGE UP
-  OXACILLIN: SIGNIFICANT CHANGE UP
-  PENICILLIN: SIGNIFICANT CHANGE UP
-  RIFAMPIN: SIGNIFICANT CHANGE UP
-  TETRACYCLINE: SIGNIFICANT CHANGE UP
-  TRIMETHOPRIM/SULFAMETHOXAZOLE: SIGNIFICANT CHANGE UP
-  VANCOMYCIN: SIGNIFICANT CHANGE UP
ANION GAP SERPL CALC-SCNC: 11 MMOL/L — SIGNIFICANT CHANGE UP (ref 5–17)
ANION GAP SERPL CALC-SCNC: 11 MMOL/L — SIGNIFICANT CHANGE UP (ref 5–17)
ANION GAP SERPL CALC-SCNC: 12 MMOL/L — SIGNIFICANT CHANGE UP (ref 5–17)
APTT BLD: 50.5 SEC — HIGH (ref 27.5–35.5)
APTT BLD: 56.7 SEC — HIGH (ref 27.5–35.5)
APTT BLD: 65.1 SEC — HIGH (ref 27.5–35.5)
BASOPHILS # BLD AUTO: 0 K/UL — SIGNIFICANT CHANGE UP (ref 0–0.2)
BASOPHILS # BLD AUTO: 0.15 K/UL — SIGNIFICANT CHANGE UP (ref 0–0.2)
BASOPHILS NFR BLD AUTO: 0 % — SIGNIFICANT CHANGE UP (ref 0–2)
BASOPHILS NFR BLD AUTO: 0.5 % — SIGNIFICANT CHANGE UP (ref 0–2)
BUN SERPL-MCNC: 23 MG/DL — SIGNIFICANT CHANGE UP (ref 7–23)
BUN SERPL-MCNC: 24 MG/DL — HIGH (ref 7–23)
BUN SERPL-MCNC: 25 MG/DL — HIGH (ref 7–23)
CALCIUM SERPL-MCNC: 8.4 MG/DL — SIGNIFICANT CHANGE UP (ref 8.4–10.5)
CHLORIDE SERPL-SCNC: 100 MMOL/L — SIGNIFICANT CHANGE UP (ref 96–108)
CHLORIDE SERPL-SCNC: 97 MMOL/L — SIGNIFICANT CHANGE UP (ref 96–108)
CHLORIDE SERPL-SCNC: 98 MMOL/L — SIGNIFICANT CHANGE UP (ref 96–108)
CO2 SERPL-SCNC: 21 MMOL/L — LOW (ref 22–31)
CO2 SERPL-SCNC: 23 MMOL/L — SIGNIFICANT CHANGE UP (ref 22–31)
CO2 SERPL-SCNC: 23 MMOL/L — SIGNIFICANT CHANGE UP (ref 22–31)
CREAT SERPL-MCNC: 1.08 MG/DL — SIGNIFICANT CHANGE UP (ref 0.5–1.3)
CREAT SERPL-MCNC: 1.09 MG/DL — SIGNIFICANT CHANGE UP (ref 0.5–1.3)
CREAT SERPL-MCNC: 1.19 MG/DL — SIGNIFICANT CHANGE UP (ref 0.5–1.3)
EOSINOPHIL # BLD AUTO: 0 K/UL — SIGNIFICANT CHANGE UP (ref 0–0.5)
EOSINOPHIL # BLD AUTO: 0.03 K/UL — SIGNIFICANT CHANGE UP (ref 0–0.5)
EOSINOPHIL NFR BLD AUTO: 0 % — SIGNIFICANT CHANGE UP (ref 0–6)
EOSINOPHIL NFR BLD AUTO: 0.1 % — SIGNIFICANT CHANGE UP (ref 0–6)
FERRITIN SERPL-MCNC: 956 NG/ML — HIGH (ref 15–150)
GAS PNL BLDA: SIGNIFICANT CHANGE UP
GLUCOSE BLDC GLUCOMTR-MCNC: 135 MG/DL — HIGH (ref 70–99)
GLUCOSE BLDC GLUCOMTR-MCNC: 141 MG/DL — HIGH (ref 70–99)
GLUCOSE BLDC GLUCOMTR-MCNC: 143 MG/DL — HIGH (ref 70–99)
GLUCOSE BLDC GLUCOMTR-MCNC: 143 MG/DL — HIGH (ref 70–99)
GLUCOSE BLDC GLUCOMTR-MCNC: 146 MG/DL — HIGH (ref 70–99)
GLUCOSE BLDC GLUCOMTR-MCNC: 147 MG/DL — HIGH (ref 70–99)
GLUCOSE BLDC GLUCOMTR-MCNC: 149 MG/DL — HIGH (ref 70–99)
GLUCOSE BLDC GLUCOMTR-MCNC: 150 MG/DL — HIGH (ref 70–99)
GLUCOSE BLDC GLUCOMTR-MCNC: 153 MG/DL — HIGH (ref 70–99)
GLUCOSE BLDC GLUCOMTR-MCNC: 156 MG/DL — HIGH (ref 70–99)
GLUCOSE BLDC GLUCOMTR-MCNC: 157 MG/DL — HIGH (ref 70–99)
GLUCOSE BLDC GLUCOMTR-MCNC: 159 MG/DL — HIGH (ref 70–99)
GLUCOSE BLDC GLUCOMTR-MCNC: 162 MG/DL — HIGH (ref 70–99)
GLUCOSE BLDC GLUCOMTR-MCNC: 162 MG/DL — HIGH (ref 70–99)
GLUCOSE BLDC GLUCOMTR-MCNC: 163 MG/DL — HIGH (ref 70–99)
GLUCOSE BLDC GLUCOMTR-MCNC: 167 MG/DL — HIGH (ref 70–99)
GLUCOSE BLDC GLUCOMTR-MCNC: 176 MG/DL — HIGH (ref 70–99)
GLUCOSE BLDC GLUCOMTR-MCNC: 178 MG/DL — HIGH (ref 70–99)
GLUCOSE SERPL-MCNC: 146 MG/DL — HIGH (ref 70–99)
GLUCOSE SERPL-MCNC: 165 MG/DL — HIGH (ref 70–99)
GLUCOSE SERPL-MCNC: 178 MG/DL — HIGH (ref 70–99)
GRAM STN FLD: SIGNIFICANT CHANGE UP
HCT VFR BLD CALC: 34 % — LOW (ref 34.5–45)
HCT VFR BLD CALC: 34.1 % — LOW (ref 34.5–45)
HCT VFR BLD CALC: 34.5 % — SIGNIFICANT CHANGE UP (ref 34.5–45)
HCT VFR BLD CALC: 35.3 % — SIGNIFICANT CHANGE UP (ref 34.5–45)
HGB BLD-MCNC: 10.3 G/DL — LOW (ref 11.5–15.5)
HGB BLD-MCNC: 10.4 G/DL — LOW (ref 11.5–15.5)
HGB BLD-MCNC: 10.4 G/DL — LOW (ref 11.5–15.5)
HGB BLD-MCNC: 10.7 G/DL — LOW (ref 11.5–15.5)
IMM GRANULOCYTES NFR BLD AUTO: 13 % — HIGH (ref 0–1.5)
INR BLD: 0.91 RATIO — SIGNIFICANT CHANGE UP (ref 0.88–1.16)
INR BLD: 0.91 RATIO — SIGNIFICANT CHANGE UP (ref 0.88–1.16)
INR BLD: 0.92 RATIO — SIGNIFICANT CHANGE UP (ref 0.88–1.16)
LYMPHOCYTES # BLD AUTO: 0.24 K/UL — LOW (ref 1–3.3)
LYMPHOCYTES # BLD AUTO: 0.75 K/UL — LOW (ref 1–3.3)
LYMPHOCYTES # BLD AUTO: 0.8 % — LOW (ref 13–44)
LYMPHOCYTES # BLD AUTO: 2.7 % — LOW (ref 13–44)
MAGNESIUM SERPL-MCNC: 2.6 MG/DL — SIGNIFICANT CHANGE UP (ref 1.6–2.6)
MANUAL SMEAR VERIFICATION: SIGNIFICANT CHANGE UP
MCHC RBC-ENTMCNC: 27.5 PG — SIGNIFICANT CHANGE UP (ref 27–34)
MCHC RBC-ENTMCNC: 27.7 PG — SIGNIFICANT CHANGE UP (ref 27–34)
MCHC RBC-ENTMCNC: 27.8 PG — SIGNIFICANT CHANGE UP (ref 27–34)
MCHC RBC-ENTMCNC: 28 PG — SIGNIFICANT CHANGE UP (ref 27–34)
MCHC RBC-ENTMCNC: 30.1 GM/DL — LOW (ref 32–36)
MCHC RBC-ENTMCNC: 30.3 GM/DL — LOW (ref 32–36)
MCHC RBC-ENTMCNC: 30.3 GM/DL — LOW (ref 32–36)
MCHC RBC-ENTMCNC: 30.5 GM/DL — LOW (ref 32–36)
MCV RBC AUTO: 90.9 FL — SIGNIFICANT CHANGE UP (ref 80–100)
MCV RBC AUTO: 91.3 FL — SIGNIFICANT CHANGE UP (ref 80–100)
MCV RBC AUTO: 91.7 FL — SIGNIFICANT CHANGE UP (ref 80–100)
MCV RBC AUTO: 92.4 FL — SIGNIFICANT CHANGE UP (ref 80–100)
METAMYELOCYTES # FLD: 2.6 % — HIGH (ref 0–0)
METHOD TYPE: SIGNIFICANT CHANGE UP
MONOCYTES # BLD AUTO: 0.51 K/UL — SIGNIFICANT CHANGE UP (ref 0–0.9)
MONOCYTES # BLD AUTO: 1.36 K/UL — HIGH (ref 0–0.9)
MONOCYTES NFR BLD AUTO: 1.7 % — LOW (ref 2–14)
MONOCYTES NFR BLD AUTO: 4.8 % — SIGNIFICANT CHANGE UP (ref 2–14)
MYELOCYTES NFR BLD: 1.7 % — HIGH (ref 0–0)
NEUTROPHILS # BLD AUTO: 22.32 K/UL — HIGH (ref 1.8–7.4)
NEUTROPHILS # BLD AUTO: 27.89 K/UL — HIGH (ref 1.8–7.4)
NEUTROPHILS NFR BLD AUTO: 78.9 % — HIGH (ref 43–77)
NEUTROPHILS NFR BLD AUTO: 92.3 % — HIGH (ref 43–77)
NRBC # BLD: 0 /100 WBCS — SIGNIFICANT CHANGE UP (ref 0–0)
NRBC # BLD: 1 /100 — HIGH (ref 0–0)
PHOSPHATE SERPL-MCNC: 2.8 MG/DL — SIGNIFICANT CHANGE UP (ref 2.5–4.5)
PHOSPHATE SERPL-MCNC: 3.2 MG/DL — SIGNIFICANT CHANGE UP (ref 2.5–4.5)
PHOSPHATE SERPL-MCNC: 3.3 MG/DL — SIGNIFICANT CHANGE UP (ref 2.5–4.5)
PLAT MORPH BLD: NORMAL — SIGNIFICANT CHANGE UP
PLATELET # BLD AUTO: 156 K/UL — SIGNIFICANT CHANGE UP (ref 150–400)
PLATELET # BLD AUTO: 165 K/UL — SIGNIFICANT CHANGE UP (ref 150–400)
PLATELET # BLD AUTO: 165 K/UL — SIGNIFICANT CHANGE UP (ref 150–400)
PLATELET # BLD AUTO: 168 K/UL — SIGNIFICANT CHANGE UP (ref 150–400)
POTASSIUM SERPL-MCNC: 4.8 MMOL/L — SIGNIFICANT CHANGE UP (ref 3.5–5.3)
POTASSIUM SERPL-MCNC: 4.9 MMOL/L — SIGNIFICANT CHANGE UP (ref 3.5–5.3)
POTASSIUM SERPL-MCNC: 5 MMOL/L — SIGNIFICANT CHANGE UP (ref 3.5–5.3)
POTASSIUM SERPL-SCNC: 4.8 MMOL/L — SIGNIFICANT CHANGE UP (ref 3.5–5.3)
POTASSIUM SERPL-SCNC: 4.9 MMOL/L — SIGNIFICANT CHANGE UP (ref 3.5–5.3)
POTASSIUM SERPL-SCNC: 5 MMOL/L — SIGNIFICANT CHANGE UP (ref 3.5–5.3)
PROTHROM AB SERPL-ACNC: 11 SEC — SIGNIFICANT CHANGE UP (ref 10.6–13.6)
PROTHROM AB SERPL-ACNC: 11 SEC — SIGNIFICANT CHANGE UP (ref 10.6–13.6)
PROTHROM AB SERPL-ACNC: 11.1 SEC — SIGNIFICANT CHANGE UP (ref 10.6–13.6)
RBC # BLD: 3.68 M/UL — LOW (ref 3.8–5.2)
RBC # BLD: 3.75 M/UL — LOW (ref 3.8–5.2)
RBC # BLD: 3.78 M/UL — LOW (ref 3.8–5.2)
RBC # BLD: 3.85 M/UL — SIGNIFICANT CHANGE UP (ref 3.8–5.2)
RBC # FLD: 16.9 % — HIGH (ref 10.3–14.5)
RBC # FLD: 17.1 % — HIGH (ref 10.3–14.5)
RBC # FLD: 17.1 % — HIGH (ref 10.3–14.5)
RBC # FLD: 17.2 % — HIGH (ref 10.3–14.5)
RBC BLD AUTO: SIGNIFICANT CHANGE UP
SODIUM SERPL-SCNC: 132 MMOL/L — LOW (ref 135–145)
SPECIMEN SOURCE: SIGNIFICANT CHANGE UP
SPECIMEN SOURCE: SIGNIFICANT CHANGE UP
TRIGL SERPL-MCNC: 289 MG/DL — HIGH
VANCOMYCIN TROUGH SERPL-MCNC: 21.5 UG/ML — HIGH (ref 10–20)
VARIANT LYMPHS # BLD: 0.9 % — SIGNIFICANT CHANGE UP (ref 0–6)
WBC # BLD: 28.28 K/UL — HIGH (ref 3.8–10.5)
WBC # BLD: 30.15 K/UL — HIGH (ref 3.8–10.5)
WBC # BLD: 30.22 K/UL — HIGH (ref 3.8–10.5)
WBC # BLD: 31.06 K/UL — HIGH (ref 3.8–10.5)
WBC # FLD AUTO: 28.28 K/UL — HIGH (ref 3.8–10.5)
WBC # FLD AUTO: 30.15 K/UL — HIGH (ref 3.8–10.5)
WBC # FLD AUTO: 30.22 K/UL — HIGH (ref 3.8–10.5)
WBC # FLD AUTO: 31.06 K/UL — HIGH (ref 3.8–10.5)

## 2020-11-29 PROCEDURE — 99291 CRITICAL CARE FIRST HOUR: CPT

## 2020-11-29 PROCEDURE — 93010 ELECTROCARDIOGRAM REPORT: CPT | Mod: 59

## 2020-11-29 PROCEDURE — 71045 X-RAY EXAM CHEST 1 VIEW: CPT | Mod: 26

## 2020-11-29 PROCEDURE — 99233 SBSQ HOSP IP/OBS HIGH 50: CPT | Mod: GC

## 2020-11-29 RX ORDER — HUMAN INSULIN 100 [IU]/ML
10 INJECTION, SUSPENSION SUBCUTANEOUS EVERY 6 HOURS
Refills: 0 | Status: ACTIVE | OUTPATIENT
Start: 2020-11-29 | End: 2021-10-28

## 2020-11-29 RX ORDER — DEXTROSE 50 % IN WATER 50 %
25 SYRINGE (ML) INTRAVENOUS ONCE
Refills: 0 | Status: ACTIVE | OUTPATIENT
Start: 2020-11-29

## 2020-11-29 RX ORDER — SODIUM CHLORIDE 9 MG/ML
1000 INJECTION, SOLUTION INTRAVENOUS
Refills: 0 | Status: ACTIVE | OUTPATIENT
Start: 2020-11-29 | End: 2021-10-28

## 2020-11-29 RX ORDER — CHLORHEXIDINE GLUCONATE 213 G/1000ML
15 SOLUTION TOPICAL EVERY 12 HOURS
Refills: 0 | Status: DISCONTINUED | OUTPATIENT
Start: 2020-11-29 | End: 2020-12-05

## 2020-11-29 RX ORDER — HEPARIN SODIUM 5000 [USP'U]/ML
1900 INJECTION INTRAVENOUS; SUBCUTANEOUS
Qty: 25000 | Refills: 0 | Status: DISCONTINUED | OUTPATIENT
Start: 2020-11-29 | End: 2020-12-07

## 2020-11-29 RX ORDER — INSULIN LISPRO 100/ML
VIAL (ML) SUBCUTANEOUS
Refills: 0 | Status: DISCONTINUED | OUTPATIENT
Start: 2020-11-29 | End: 2020-11-29

## 2020-11-29 RX ORDER — GLUCAGON INJECTION, SOLUTION 0.5 MG/.1ML
1 INJECTION, SOLUTION SUBCUTANEOUS ONCE
Refills: 0 | Status: ACTIVE | OUTPATIENT
Start: 2020-11-29 | End: 2021-10-28

## 2020-11-29 RX ORDER — DEXTROSE 50 % IN WATER 50 %
15 SYRINGE (ML) INTRAVENOUS ONCE
Refills: 0 | Status: ACTIVE | OUTPATIENT
Start: 2020-11-29 | End: 2021-10-28

## 2020-11-29 RX ORDER — DEXTROSE 50 % IN WATER 50 %
12.5 SYRINGE (ML) INTRAVENOUS ONCE
Refills: 0 | Status: ACTIVE | OUTPATIENT
Start: 2020-11-29

## 2020-11-29 RX ADMIN — CHLORHEXIDINE GLUCONATE 1 APPLICATION(S): 213 SOLUTION TOPICAL at 05:36

## 2020-11-29 RX ADMIN — MEROPENEM 100 MILLIGRAM(S): 1 INJECTION INTRAVENOUS at 17:04

## 2020-11-29 RX ADMIN — Medication 12.8 MICROGRAM(S)/KG/MIN: at 05:06

## 2020-11-29 RX ADMIN — CISATRACURIUM BESYLATE 24.5 MICROGRAM(S)/KG/MIN: 2 INJECTION INTRAVENOUS at 13:13

## 2020-11-29 RX ADMIN — INSULIN HUMAN 3 UNIT(S)/HR: 100 INJECTION, SOLUTION SUBCUTANEOUS at 06:36

## 2020-11-29 RX ADMIN — CHLORHEXIDINE GLUCONATE 15 MILLILITER(S): 213 SOLUTION TOPICAL at 17:04

## 2020-11-29 RX ADMIN — INSULIN HUMAN 3 UNIT(S)/HR: 100 INJECTION, SOLUTION SUBCUTANEOUS at 05:06

## 2020-11-29 RX ADMIN — HEPARIN SODIUM 20 UNIT(S)/HR: 5000 INJECTION INTRAVENOUS; SUBCUTANEOUS at 06:36

## 2020-11-29 RX ADMIN — Medication 10 MILLIGRAM(S): at 23:04

## 2020-11-29 RX ADMIN — MIDAZOLAM HYDROCHLORIDE 2.72 MG/KG/HR: 1 INJECTION, SOLUTION INTRAMUSCULAR; INTRAVENOUS at 05:05

## 2020-11-29 RX ADMIN — Medication 12.8 MICROGRAM(S)/KG/MIN: at 12:19

## 2020-11-29 RX ADMIN — ALBUTEROL 4 PUFF(S): 90 AEROSOL, METERED ORAL at 00:40

## 2020-11-29 RX ADMIN — PROPOFOL 40.8 MICROGRAM(S)/KG/MIN: 10 INJECTION, EMULSION INTRAVENOUS at 12:19

## 2020-11-29 RX ADMIN — CHLORHEXIDINE GLUCONATE 15 MILLILITER(S): 213 SOLUTION TOPICAL at 05:04

## 2020-11-29 RX ADMIN — Medication 2 PUFF(S): at 06:08

## 2020-11-29 RX ADMIN — ALBUTEROL 4 PUFF(S): 90 AEROSOL, METERED ORAL at 06:09

## 2020-11-29 RX ADMIN — PROPOFOL 40.8 MICROGRAM(S)/KG/MIN: 10 INJECTION, EMULSION INTRAVENOUS at 06:35

## 2020-11-29 RX ADMIN — PANTOPRAZOLE SODIUM 40 MILLIGRAM(S): 20 TABLET, DELAYED RELEASE ORAL at 12:16

## 2020-11-29 RX ADMIN — Medication 10 MILLIGRAM(S): at 17:04

## 2020-11-29 RX ADMIN — CISATRACURIUM BESYLATE 24.5 MICROGRAM(S)/KG/MIN: 2 INJECTION INTRAVENOUS at 06:35

## 2020-11-29 RX ADMIN — MIDAZOLAM HYDROCHLORIDE 2.72 MG/KG/HR: 1 INJECTION, SOLUTION INTRAMUSCULAR; INTRAVENOUS at 06:35

## 2020-11-29 RX ADMIN — ALBUTEROL 4 PUFF(S): 90 AEROSOL, METERED ORAL at 23:52

## 2020-11-29 RX ADMIN — Medication 10 MILLIGRAM(S): at 12:16

## 2020-11-29 RX ADMIN — Medication 6 MILLIGRAM(S): at 05:04

## 2020-11-29 RX ADMIN — CISATRACURIUM BESYLATE 24.5 MICROGRAM(S)/KG/MIN: 2 INJECTION INTRAVENOUS at 05:06

## 2020-11-29 RX ADMIN — PROPOFOL 40.8 MICROGRAM(S)/KG/MIN: 10 INJECTION, EMULSION INTRAVENOUS at 23:05

## 2020-11-29 RX ADMIN — ALBUTEROL 4 PUFF(S): 90 AEROSOL, METERED ORAL at 13:05

## 2020-11-29 RX ADMIN — Medication 2 PUFF(S): at 18:00

## 2020-11-29 RX ADMIN — Medication 2 PUFF(S): at 13:06

## 2020-11-29 RX ADMIN — Medication 1 APPLICATION(S): at 05:05

## 2020-11-29 RX ADMIN — MIDODRINE HYDROCHLORIDE 10 MILLIGRAM(S): 2.5 TABLET ORAL at 13:14

## 2020-11-29 RX ADMIN — HEPARIN SODIUM 19 UNIT(S)/HR: 5000 INJECTION INTRAVENOUS; SUBCUTANEOUS at 05:06

## 2020-11-29 RX ADMIN — Medication 10 MILLIGRAM(S): at 05:04

## 2020-11-29 RX ADMIN — MIDODRINE HYDROCHLORIDE 10 MILLIGRAM(S): 2.5 TABLET ORAL at 23:04

## 2020-11-29 RX ADMIN — Medication 1 APPLICATION(S): at 17:04

## 2020-11-29 RX ADMIN — MEROPENEM 100 MILLIGRAM(S): 1 INJECTION INTRAVENOUS at 05:04

## 2020-11-29 RX ADMIN — MIDODRINE HYDROCHLORIDE 10 MILLIGRAM(S): 2.5 TABLET ORAL at 05:05

## 2020-11-29 RX ADMIN — ALBUTEROL 4 PUFF(S): 90 AEROSOL, METERED ORAL at 18:00

## 2020-11-29 RX ADMIN — Medication 2 PUFF(S): at 00:39

## 2020-11-29 RX ADMIN — Medication 12.8 MICROGRAM(S)/KG/MIN: at 23:05

## 2020-11-29 RX ADMIN — Medication 12.8 MICROGRAM(S)/KG/MIN: at 06:36

## 2020-11-29 RX ADMIN — CISATRACURIUM BESYLATE 24.5 MICROGRAM(S)/KG/MIN: 2 INJECTION INTRAVENOUS at 23:05

## 2020-11-29 RX ADMIN — HUMAN INSULIN 10 UNIT(S): 100 INJECTION, SUSPENSION SUBCUTANEOUS at 23:04

## 2020-11-29 RX ADMIN — HUMAN INSULIN 10 UNIT(S): 100 INJECTION, SUSPENSION SUBCUTANEOUS at 17:03

## 2020-11-29 RX ADMIN — MIDAZOLAM HYDROCHLORIDE 2.72 MG/KG/HR: 1 INJECTION, SOLUTION INTRAMUSCULAR; INTRAVENOUS at 23:05

## 2020-11-29 RX ADMIN — PROPOFOL 40.8 MICROGRAM(S)/KG/MIN: 10 INJECTION, EMULSION INTRAVENOUS at 05:05

## 2020-11-29 RX ADMIN — Medication 2 PUFF(S): at 23:52

## 2020-11-29 NOTE — PROGRESS NOTE ADULT - ATTENDING COMMENTS
60F w/ pmhx morbid obesity, AARON, HTN, PE in the past, p/w  acute hypoxic respiratory failure d/t COVID+ pneumonia  and now ARDS. Pt intubated and currently paralyzed and supined. Prior attempt at proning  had immediate severe desaturation.  Currently supine on high fio2 and high peep. Cont LTV ventilation. Cont paralysis and monitor for synchrony.  Higher peep used for morbid obesity and body habitus causing increased chest wall elastance.  Cont pressors to maintain map>65.   Not a candidate for ecmo based on body habitus and BMI. Severe ARTEMIO now on cvvh w/ fluid removal and pt with improvement in oxygen saturation. f/u renal reccs.   Patient w/ new staph and GNR bacteremia.  Cont meropenem and can d/c vanco. mrsa negative, but staph aureus +. Cont a/c for VTE hx and covid, monitor PTT and for bleeding. Prognosis guarded.

## 2020-11-29 NOTE — PROGRESS NOTE ADULT - ASSESSMENT
Pt is a 61 y/o F w PMH of AARNO, HTN, HLD, prior DVT/PE hx, asthma presented to [facility] for SOB and productive cough prior to admission. Admitted for acute hypoxic respiratory failure 2/2 to COVID infection. Was subsequently intubated and proned on 11/23//20. Was transferred to ICU for further management. Was started on pressors and nimbex drip. Nephrology consulted for Thompson

## 2020-11-29 NOTE — PROGRESS NOTE ADULT - ATTENDING COMMENTS
I have seen this patient with the fellow and agree with their assessment and plan. In addition,    # ARTEMIO- likley  ATN - cont CVVHD    # Volume overload - secondary to ARTEMIO. CRRT as above. UF goal ~50ml/hour.      # Medication toxicity Monitoring: medication dose reviewed. Please dose medications to CrCl<15 I have seen this patient with the fellow and agree with their assessment and plan. In addition,    # ARTEMIO- likley  ATN - cont CVVHD  # Volume overload - secondary to ARTEMIO. CRRT as above. UF goal ~50ml/hour.    #hypotension- pressors per MICU  # Covid19 now s/p remdesivir, cont dexa; ID follow up  # Medication toxicity Monitoring: medication dose reviewed. Please dose medications to CrCl<15

## 2020-11-29 NOTE — PROGRESS NOTE ADULT - ASSESSMENT
61 y/o AARON, htn, DVT, PE, asthma? admitted to [facility] for cough, SOB, diarrhea found to have covid19, hospital course c/b Acute Hypoxic resp failure d/t to covid19 pna, intubated on 11/22/2020 and transfer to MICU. Patient with worsening covid PNA, new Staph PNA, (sputum with Staph), bacteremia, worsening PF ratio, attempted to prone 11/27 night with no success as patient did not tolerate, needed to be resuspined shortly after due to hypoxia. Renal fx continue to worsen, urine output low, with no significant improvement with diuresis. On 11/28 patient was started on CVVHD. All acces lines were also rotated.    -Neuro: intubated paralyzed and sedated cont prop/versed and nimbex for vent synchrony, fentanyl stopped 11/26 for concerns of increased risk of ileus, continue full sedation as pt still with high ventilator requirement  -Cardiac: Distributive shock, cont levo to maintain MAP >65 titrated as tolerated, continue midodrine   -Resp: Acute hypoxic resp failure 2/2 to covid19 cont PRVC vent setting, overnight with worsening hypoxia, patient attempted to be proned, did not tolerate, due to worsening hypoxia, resupined shortly after, hypoxia improvement since CVVHD started,  continue to titrate as tolerated to maintain o2 >88%, cont pulm toilet,   -GI: NPO, increase trickle feeds with nepro if tolerates, monitor residuals (last 400cc), continue reglan, GI ppx w/ protonix  -Renal: ARTEMIO worsened, low urine output yesterday despite diuretics, patient started yesterday on CVVHD via R IJ shiley, uo improved, tolerating well, nephrology following,  cont to monitor I&O closely, cont to trend and replete lytes prn  -ID: Covid19 now s/p remdesivir, cont dexa, on 11/26 Ceftriaxone started as pt with sputum Staph, repeat blood cultures positive with gram negative rods and gram positive cocci in clusters ABX escalated to Vanco 11/27 and Meropenem started 11/28  -Heme: Cont full dose AC given high risk for DVT/PE, heparin gtt increased to 1700 as ptt subtherapeutic, w/in parameters, Hb stable, continue to monitor  -Endo: DM2 a1c 7.5 w/ persistent hyperglycemia, still requires freddy amount of insulin, continue insulin gtt and monitor FS   -Dispo: pt critically ill, remains in MICU, GOC ongoing pt is full code, prognosis guarded. 59 y/o AARON, htn, DVT, PE, asthma? admitted to [facility] for cough, SOB, diarrhea found to have covid19, hospital course c/b Acute Hypoxic resp failure d/t to covid19 pna, intubated on 11/22/2020 and transfer to MICU. Patient with worsening covid PNA, new Staph PNA, (sputum with Staph), bacteremia, worsening PF ratio, attempted to prone 11/27 night with no success as patient did not tolerate, needed to be resuspined shortly after due to hypoxia. Renal fx continue to worsen, urine output low, with no significant improvement with diuresis. On 11/28 patient was started on CVVHD. All acces lines were also rotated.    -Neuro: intubated paralyzed and sedated cont prop/versed and nimbex for vent synchrony, fentanyl stopped 11/26 for concerns of increased risk of ileus, continue full sedation as pt still with high ventilator requirement  -Cardiac: Distributive shock, cont levo to maintain MAP >65 titrated as tolerated, continue midodrine   -Resp: Acute hypoxic resp failure 2/2 to covid19 cont PRVC vent setting, hypoxia improvement since CVVHD started, patient with high peak pressure and high plateau pressure, ventilatior settings changed to pressure support with intention of lowering pressures, current vent setting: PC 38/30 RR/70%/16+, continue to titrate as tolerated to maintain o2 >88%, cont pulm toilet,   -GI: NPO, tube feeding were held over night again, restart trickle feeds with nepro as tolerates, monitor residuals (last 400cc), continue reglan, GI ppx w/ protonix  -Renal: ARTEMIO worsened, low urine output yesterday despite diuretics, patient started yesterday on CVVHD via R IJ shiley, uo improved, tolerating well, nephrology following,  cont to monitor I&O closely, cont to trend and replete lytes prn  -ID: Covid19 now s/p remdesivir, cont dexa, on 11/26 Ceftriaxone started as pt with sputum Staph, repeat blood cultures positive with gram negative rods and gram positive cocci in clusters ABX escalated to Vanco 11/27 and Meropenem started 11/28, patient afebrile, we will stop Vancomycin today and obtain ID consultation  -Heme: Cont full dose AC given high risk for DVT/PE, heparin gtt increased to 1700 as ptt subtherapeutic, w/in parameters, Hb stable, continue to monitor  -Endo: DM2 a1c 7.5 w/ persistent hyperglycemia, transition insulin gtt, continue insulin gtt and monitor FS   -Dispo: pt critically ill, remains in MICU, C ongoing pt is full code, prognosis guarded. 61 y/o AARON, htn, DVT, PE, asthma? admitted to [facility] for cough, SOB, diarrhea found to have covid19, hospital course c/b Acute Hypoxic resp failure d/t to covid19 pna, intubated on 11/22/2020 and transfer to MICU. Patient with worsening covid PNA, new Staph PNA, (sputum with Staph), bacteremia, worsening PF ratio, attempted to prone 11/27 night with no success as patient did not tolerate, needed to be resuspined shortly after due to hypoxia. Renal fx continue to worsen, urine output low, with no significant improvement with diuresis. On 11/28 patient was started on CVVHD. All acces lines were also rotated.    -Neuro: intubated paralyzed and sedated cont prop/versed and nimbex for vent synchrony, fentanyl stopped 11/26 for concerns of increased risk of ileus, continue full sedation as pt still with high ventilator requirement  -Cardiac: Distributive shock, cont levo to maintain MAP >65 titrated as tolerated, continue midodrine   -Resp: Acute hypoxic resp failure 2/2 to covid19 cont PRVC vent setting, hypoxia improvement since CVVHD started, patient with high peak pressure and high plateau pressure, ventilator settings changed to pressure support with intention of lowering pressures, current vent setting: PC 38/30 RR/70%/16+, continue to titrate as tolerated to maintain o2 >88%, cont pulm toilet,   -GI: NPO, tube feeding were held over night again, restart trickle feeds with nepro as tolerates, monitor residuals (last 400cc), continue reglan, GI ppx w/ protonix  -Renal: ARTEMIO worsened, low urine output yesterday despite diuretics, patient started yesterday on CVVHD via R IJ shiley, uo improved, tolerating well, nephrology following,  cont to monitor I&O closely, cont to trend and replete lytes prn  -ID: Covid19 now s/p remdesivir, cont dexa, on 11/26 Ceftriaxone started as pt with sputum Staph, repeat blood cultures positive with gram negative rods and gram positive cocci in clusters ABX escalated to Vanco 11/27 and Meropenem started 11/28, patient afebrile, we will stop Vancomycin today and obtain ID consultation  -Heme: Cont full dose AC given high risk for DVT/PE, heparin gtt increased to 1700 as ptt subtherapeutic, w/in parameters, Hb stable, continue to monitor  -Endo: DM2 a1c 7.5 w/ persistent hyperglycemia, transition insulin gtt, continue insulin gtt and monitor FS   -Dispo: pt critically ill, remains in MICU, GOC ongoing pt is full code, prognosis guarded.

## 2020-11-29 NOTE — PROGRESS NOTE ADULT - PROBLEM SELECTOR PLAN 1
Pt with ARTEMIO in the setting of septic shock 2/2 to COVID infection. Now likely in ATN. Noted to have Scr of 0.53 on 11/23/20, then Scr further increased to 1.64 on 11/24/20, 2.18 on 11/25/20 and then further to 2.57 on 11/26/20 and 3.0 on 11/27. Patient was started on CRRT and patient now oligoanuric. Started on bumex drip. Lastest UA with mod leuk est, 8WBC and 3RBC. Urine Na on 11/26/20 w urine Na of < 35 suggestive of pre-renal etiology, however cr trend suggestive of ATN. Agree with bumex drip, in attempt to improve respiratory status WITH CRRT to augment fluid removal. Monitor labs and urine output. Avoid NSAIDs, ACEI/ARBS, RCA and nephrotoxins. Dose medications as per eGFR

## 2020-11-29 NOTE — PROGRESS NOTE ADULT - SUBJECTIVE AND OBJECTIVE BOX
INTERVAL HPI: 59 y/o AARON, htn, DVT, PE, asthma? admitted to [facility] for cough, SOB, diarrhea found to have covid19, hospital course c/b Acute Hypoxic resp failure d/t to covid19 pna, intubated on 2020 and transfer to MICU. Patient with worsening covid PNA, new Staph PNA, (sputum with Staph), bacteremia, worsening PF ratio, failed proning  night. Renal fx worsened, urine output low, with no significant improvement with diuresis. On  patient was started on CVVHD. All acces lines were also rotated.    Over night events: Patient able to tolerate CVVHD, Hypoxemia improving. High tube feeding residuals over night.    Review of Systems: Unable to obtain 2/2 sedation      SUBJECTIVE: Patient seen and examined at bedside.       VITAL SIGNS:  ICU Vital Signs Last 24 Hrs  T(C): 37.3 (2020 12:00), Max: 37.4 (2020 11:15)  T(F): 99.1 (:00), Max: 99.3 (2020 11:15)  HR: 88 (2020 12:06) (78 - 96)  BP: --  BP(mean): --  ABP: 127/47 (2020 12:00) (93/41 - 172/54)  ABP(mean): 67 (2020 12:00) (8 - 97)  RR: 30 (2020 12:00) (29 - 30)  SpO2: 96% (2020 12:06) (91% - 98%)    Mode: AC/ CMV (Assist Control/ Continuous Mandatory Ventilation), RR (machine): 30, TV (machine): 350, FiO2: 70, PEEP: 18, ITime: 1, MAP: 25, PIP: 46  Plateau pressure: 42  P/F ratio: 155  ventilator settin/350/60/18     @ 07:01  -   @ 07:00  --------------------------------------------------------  IN: 3977.7 mL / OUT: 6137 mL / NET: -2159.3 mL     @ 07:01  -   @ 12:16  --------------------------------------------------------  IN: 482.1 mL / OUT: 739 mL / NET: -256.9 mL      CAPILLARY BLOOD GLUCOSE      POCT Blood Glucose.: 163 mg/dL (2020 11:06)    ECG:< from: 12 Lead ECG (20 @ 10:39) >  Systolic  mmHg    Diastolic BP 50 mmHg    Ventricular Rate 62 BPM    Atrial Rate 62 BPM    P-R Interval 148 ms    QRS Duration 94 ms    Q-T Interval 446 ms    QTC Calculation(Bazett) 452 ms    P Axis 16 degrees    R Axis 68 degrees    T Axis 22 degrees    Diagnosis Line NORMAL SINUS RHYTHM  NORMAL ECG  WHEN COMPARED WITH ECG OF 2020  NO SIGNIFICANT CHANGE WAS FOUND  Confirmed by NOAH DUMONT MD ([id number]) on 2020 12:10:36 PM    < end of copied text >      PHYSICAL EXAM:  Gen: Comfortable in bed in NAD,   Neuro: sedated, paralyzed and intubated  Resp: good air entry b/l, mild wheezing on Rside  CVS: +RRR  Abd: BSx4, soft, ND, large pannus  Ext: +2 edema   Skin: warm/dry      MEDICATIONS:  MEDICATIONS  (STANDING):  ALBUTerol    90 MICROgram(s) HFA Inhaler 4 Puff(s) Inhalation every 6 hours  chlorhexidine 0.12% Liquid 15 milliLiter(s) Oral Mucosa every 12 hours  chlorhexidine 4% Liquid 1 Application(s) Topical <User Schedule>  chlorhexidine 4% Liquid 1 Application(s) Topical <User Schedule>  cisatracurium Infusion 3 MICROgram(s)/kG/Min (24.5 mL/Hr) IV Continuous <Continuous>  CRRT Treatment    <Continuous>  dexAMETHasone  Injectable 6 milliGRAM(s) IV Push daily  heparin  Infusion 1900 Unit(s)/Hr (20 mL/Hr) IV Continuous <Continuous>  insulin regular Infusion 3 Unit(s)/Hr (3 mL/Hr) IV Continuous <Continuous>  ipratropium 17 MICROgram(s) HFA Inhaler 2 Puff(s) Inhalation every 6 hours  meropenem  IVPB 1000 milliGRAM(s) IV Intermittent every 12 hours  metoclopramide Injectable 10 milliGRAM(s) IV Push every 6 hours  midazolam Infusion 0.02 mG/kG/Hr (2.72 mL/Hr) IV Continuous <Continuous>  midodrine 10 milliGRAM(s) Oral every 8 hours  norepinephrine Infusion 0.05 MICROgram(s)/kG/Min (12.8 mL/Hr) IV Continuous <Continuous>  pantoprazole  Injectable 40 milliGRAM(s) IV Push daily  petrolatum Ophthalmic Ointment 1 Application(s) Both EYES two times a day  PrismaSATE Dialysate BGK 4 / 2.5 5000 milliLiter(s) (3000 mL/Hr) CRRT <Continuous>  PrismaSOL Filtration BGK 4 / 2.5 5000 milliLiter(s) (1000 mL/Hr) CRRT <Continuous>  PrismaSOL Filtration BGK 4 / 2.5 5000 milliLiter(s) (200 mL/Hr) CRRT <Continuous>  propofol Infusion 50 MICROgram(s)/kG/Min (40.8 mL/Hr) IV Continuous <Continuous>    MEDICATIONS  (PRN):  acetaminophen    Suspension .. 650 milliGRAM(s) Oral every 6 hours PRN Temp greater or equal to 38C (100.4F)  sodium chloride 0.9% lock flush 10 milliLiter(s) IV Push every 1 hour PRN Pre/post blood products, medications, blood draw, and to maintain line patency      ALLERGIES:  Allergies    Kiwi (Unknown)  latex (Anaphylaxis)  latex (Unknown)  penicillin (Other)  penicillin (Unknown)  perfume  hives (Other)  potassium acetate (Other)  soap additives hives (Other)    Intolerances    LABS:                        10.4   30.15 )-----------( 168      ( 2020 11:35 )             34.5     11-    132<L>  |  98  |  24<H>  ----------------------------<  178<H>  4.9   |  23  |  1.09    Ca    8.4      2020 11:35  Phos  3.3     11-  Mg     2.6     1129      PT/INR - ( 2020 11:35 )   PT: 11.0 sec;   INR: 0.91 ratio         PTT - ( 2020 11:35 )  PTT:56.7 sec      RADIOLOGY & ADDITIONAL TESTS: Reviewed.

## 2020-11-29 NOTE — PROGRESS NOTE ADULT - SUBJECTIVE AND OBJECTIVE BOX
[facility] Division of Kidney Diseases & Hypertension  FOLLOW UP NOTE  [phone]--------------------------------------------------------------------------------  Chief Complaint:Infection due to severe acute respiratory syndrome coronavirus 2 (SARS-CoV-2)    24 hour events/subjective: No acute events overnight. Patient tolerating CRRT without issues. Labs, vitals, medications and imaging reviewed.    PAST HISTORY  --------------------------------------------------------------------------------  No significant changes to PMH, PSH, FHx, SHx, unless otherwise noted    ALLERGIES & MEDICATIONS  --------------------------------------------------------------------------------  Allergies    Kiwi (Unknown)  latex (Anaphylaxis)  latex (Unknown)  penicillin (Other)  penicillin (Unknown)  perfume  hives (Other)  potassium acetate (Other)  soap additives hives (Other)    Intolerances      Standing Inpatient Medications  ALBUTerol    90 MICROgram(s) HFA Inhaler 4 Puff(s) Inhalation every 6 hours  cisatracurium Infusion 3 MICROgram(s)/kG/Min IV Continuous <Continuous>  CRRT Treatment    <Continuous>  dexAMETHasone  Injectable 6 milliGRAM(s) IV Push daily  heparin  Infusion 1900 Unit(s)/Hr IV Continuous <Continuous>  insulin regular Infusion 3 Unit(s)/Hr IV Continuous <Continuous>  ipratropium 17 MICROgram(s) HFA Inhaler 2 Puff(s) Inhalation every 6 hours  meropenem  IVPB 1000 milliGRAM(s) IV Intermittent every 12 hours  metoclopramide Injectable 10 milliGRAM(s) IV Push every 6 hours  midazolam Infusion 0.02 mG/kG/Hr IV Continuous <Continuous>  midodrine 10 milliGRAM(s) Oral every 8 hours  norepinephrine Infusion 0.05 MICROgram(s)/kG/Min IV Continuous <Continuous>  pantoprazole  Injectable 40 milliGRAM(s) IV Push daily  petrolatum Ophthalmic Ointment 1 Application(s) Both EYES two times a day  PrismaSATE Dialysate BGK 4 / 2.5 5000 milliLiter(s) CRRT <Continuous>  PrismaSOL Filtration BGK 4 / 2.5 5000 milliLiter(s) CRRT <Continuous>  PrismaSOL Filtration BGK 4 / 2.5 5000 milliLiter(s) CRRT <Continuous>  propofol Infusion 50 MICROgram(s)/kG/Min IV Continuous <Continuous>  vancomycin  IVPB 2000 milliGRAM(s) IV Intermittent every 12 hours  vancomycin  IVPB        PRN Inpatient Medications  acetaminophen    Suspension .. 650 milliGRAM(s) Oral every 6 hours PRN  sodium chloride 0.9% lock flush 10 milliLiter(s) IV Push every 1 hour PRN      REVIEW OF SYSTEMS: Unable to obtain.    VITALS/PHYSICAL EXAM  --------------------------------------------------------------------------------  T(C): 37.1 (11-29-20 @ 07:30), Max: 37.3 (11-28-20 @ 16:00)  HR: 78 (11-29-20 @ 07:30) (78 - 98)  SBP: 120-140  RR: 30 (11-29-20 @ 07:30) (30 - 30)  SpO2: 95% (11-29-20 @ 07:30) (90% - 98%)  Wt(kg): --        11-28-20 @ 07:01  -  11-29-20 @ 07:00  --------------------------------------------------------  IN: 3977.7 mL / OUT: 6137 mL / NET: -2159.3 mL    Physical Exam: Deferred due to COVID-19 positivity, and preservation of PPE.    LABS/STUDIES  --------------------------------------------------------------------------------              10.3   28.28 >-----------<  156      [11-29-20 @ 05:32]              34.0     132  |  100  |  25  ----------------------------<  146      [11-29-20 @ 05:32]  5.0   |  21  |  1.19        Ca     8.4     [11-29-20 @ 05:32]      Mg     2.6     [11-29-20 @ 05:32]      Phos  3.2     [11-29-20 @ 05:32]      PT/INR: PT 11.0 , INR 0.91       [11-29-20 @ 05:32]  PTT: 50.5       [11-29-20 @ 05:32]          [11-28-20 @ 23:22]    Creatinine Trend:  SCr 1.19 [11-29 @ 05:32]  SCr 1.22 [11-28 @ 23:22]  SCr 1.34 [11-28 @ 17:19]  SCr 1.55 [11-28 @ 10:20]  SCr 1.86 [11-28 @ 05:29]    Urinalysis - [11-26-20 @ 23:57]      Color Yellow / Appearance Slightly Turbid / SG 1.021 / pH 5.5      Gluc Negative / Ketone Negative  / Bili Negative / Urobili Negative       Blood Negative / Protein 30 mg/dL / Leuk Est Moderate / Nitrite Negative      RBC 3 / WBC 8 / Hyaline 4 / Gran  / Sq Epi  / Non Sq Epi 2 / Bacteria Occasional    Urine Protein 40      [11-24-20 @ 13:46]  Urine Sodium <35      [11-26-20 @ 23:56]  Urine Urea Nitrogen 449      [11-24-20 @ 13:46]  Urine Potassium 21      [11-24-20 @ 10:18]  Urine Phosphorus 68.0      [11-24-20 @ 13:46]  Urine Osmolality 353      [11-26-20 @ 23:57]    Ferritin 956      [11-29-20 @ 00:57]  Lipid: chol --, , HDL --, LDL --      [11-25-20 @ 00:46]

## 2020-11-30 LAB
-  AMIKACIN: SIGNIFICANT CHANGE UP
-  AMPICILLIN/SULBACTAM: SIGNIFICANT CHANGE UP
-  AMPICILLIN: SIGNIFICANT CHANGE UP
-  AZTREONAM: SIGNIFICANT CHANGE UP
-  CEFAZOLIN: SIGNIFICANT CHANGE UP
-  CEFEPIME: SIGNIFICANT CHANGE UP
-  CEFOXITIN: SIGNIFICANT CHANGE UP
-  CEFTRIAXONE: SIGNIFICANT CHANGE UP
-  CIPROFLOXACIN: SIGNIFICANT CHANGE UP
-  ERTAPENEM: SIGNIFICANT CHANGE UP
-  GENTAMICIN: SIGNIFICANT CHANGE UP
-  LEVOFLOXACIN: SIGNIFICANT CHANGE UP
-  MEROPENEM: SIGNIFICANT CHANGE UP
-  PIPERACILLIN/TAZOBACTAM: SIGNIFICANT CHANGE UP
-  TOBRAMYCIN: SIGNIFICANT CHANGE UP
-  TRIMETHOPRIM/SULFAMETHOXAZOLE: SIGNIFICANT CHANGE UP
ALBUMIN SERPL ELPH-MCNC: 2.7 G/DL — LOW (ref 3.3–5)
ALBUMIN SERPL ELPH-MCNC: 2.8 G/DL — LOW (ref 3.3–5)
ALP SERPL-CCNC: 69 U/L — SIGNIFICANT CHANGE UP (ref 40–120)
ALP SERPL-CCNC: 77 U/L — SIGNIFICANT CHANGE UP (ref 40–120)
ALT FLD-CCNC: 23 U/L — SIGNIFICANT CHANGE UP (ref 10–45)
ALT FLD-CCNC: 26 U/L — SIGNIFICANT CHANGE UP (ref 10–45)
ANION GAP SERPL CALC-SCNC: 12 MMOL/L — SIGNIFICANT CHANGE UP (ref 5–17)
ANION GAP SERPL CALC-SCNC: 13 MMOL/L — SIGNIFICANT CHANGE UP (ref 5–17)
ANION GAP SERPL CALC-SCNC: 13 MMOL/L — SIGNIFICANT CHANGE UP (ref 5–17)
ANION GAP SERPL CALC-SCNC: 14 MMOL/L — SIGNIFICANT CHANGE UP (ref 5–17)
APTT BLD: 64.1 SEC — HIGH (ref 27.5–35.5)
APTT BLD: 65.1 SEC — HIGH (ref 27.5–35.5)
AST SERPL-CCNC: 24 U/L — SIGNIFICANT CHANGE UP (ref 10–40)
AST SERPL-CCNC: 26 U/L — SIGNIFICANT CHANGE UP (ref 10–40)
BILIRUB SERPL-MCNC: 1.5 MG/DL — HIGH (ref 0.2–1.2)
BILIRUB SERPL-MCNC: 1.7 MG/DL — HIGH (ref 0.2–1.2)
BUN SERPL-MCNC: 24 MG/DL — HIGH (ref 7–23)
BUN SERPL-MCNC: 24 MG/DL — HIGH (ref 7–23)
BUN SERPL-MCNC: 26 MG/DL — HIGH (ref 7–23)
BUN SERPL-MCNC: 28 MG/DL — HIGH (ref 7–23)
CALCIUM SERPL-MCNC: 8.3 MG/DL — LOW (ref 8.4–10.5)
CALCIUM SERPL-MCNC: 8.4 MG/DL — SIGNIFICANT CHANGE UP (ref 8.4–10.5)
CALCIUM SERPL-MCNC: 8.6 MG/DL — SIGNIFICANT CHANGE UP (ref 8.4–10.5)
CALCIUM SERPL-MCNC: 8.6 MG/DL — SIGNIFICANT CHANGE UP (ref 8.4–10.5)
CHLORIDE SERPL-SCNC: 96 MMOL/L — SIGNIFICANT CHANGE UP (ref 96–108)
CHLORIDE SERPL-SCNC: 99 MMOL/L — SIGNIFICANT CHANGE UP (ref 96–108)
CO2 SERPL-SCNC: 20 MMOL/L — LOW (ref 22–31)
CO2 SERPL-SCNC: 21 MMOL/L — LOW (ref 22–31)
CREAT SERPL-MCNC: 1.01 MG/DL — SIGNIFICANT CHANGE UP (ref 0.5–1.3)
CREAT SERPL-MCNC: 1.05 MG/DL — SIGNIFICANT CHANGE UP (ref 0.5–1.3)
CREAT SERPL-MCNC: 1.12 MG/DL — SIGNIFICANT CHANGE UP (ref 0.5–1.3)
CREAT SERPL-MCNC: 1.14 MG/DL — SIGNIFICANT CHANGE UP (ref 0.5–1.3)
CRP SERPL-MCNC: 12.29 MG/DL — HIGH (ref 0–0.4)
CULTURE RESULTS: SIGNIFICANT CHANGE UP
D DIMER BLD IA.RAPID-MCNC: 1072 NG/ML DDU — HIGH
FERRITIN SERPL-MCNC: 1041 NG/ML — HIGH (ref 15–150)
GAS PNL BLDA: SIGNIFICANT CHANGE UP
GAS PNL BLDA: SIGNIFICANT CHANGE UP
GLUCOSE BLDC GLUCOMTR-MCNC: 125 MG/DL — HIGH (ref 70–99)
GLUCOSE BLDC GLUCOMTR-MCNC: 175 MG/DL — HIGH (ref 70–99)
GLUCOSE BLDC GLUCOMTR-MCNC: 188 MG/DL — HIGH (ref 70–99)
GLUCOSE BLDC GLUCOMTR-MCNC: 207 MG/DL — HIGH (ref 70–99)
GLUCOSE SERPL-MCNC: 148 MG/DL — HIGH (ref 70–99)
GLUCOSE SERPL-MCNC: 156 MG/DL — HIGH (ref 70–99)
GLUCOSE SERPL-MCNC: 179 MG/DL — HIGH (ref 70–99)
GLUCOSE SERPL-MCNC: 217 MG/DL — HIGH (ref 70–99)
HCT VFR BLD CALC: 33.4 % — LOW (ref 34.5–45)
HCT VFR BLD CALC: 33.7 % — LOW (ref 34.5–45)
HGB BLD-MCNC: 10.3 G/DL — LOW (ref 11.5–15.5)
HGB BLD-MCNC: 10.5 G/DL — LOW (ref 11.5–15.5)
INR BLD: 0.95 RATIO — SIGNIFICANT CHANGE UP (ref 0.88–1.16)
LACTATE SERPL-SCNC: 2 MMOL/L — SIGNIFICANT CHANGE UP (ref 0.7–2)
LDH SERPL L TO P-CCNC: 530 U/L — HIGH (ref 50–242)
MAGNESIUM SERPL-MCNC: 2.6 MG/DL — SIGNIFICANT CHANGE UP (ref 1.6–2.6)
MAGNESIUM SERPL-MCNC: 2.6 MG/DL — SIGNIFICANT CHANGE UP (ref 1.6–2.6)
MAGNESIUM SERPL-MCNC: 2.7 MG/DL — HIGH (ref 1.6–2.6)
MAGNESIUM SERPL-MCNC: 2.7 MG/DL — HIGH (ref 1.6–2.6)
MCHC RBC-ENTMCNC: 27.8 PG — SIGNIFICANT CHANGE UP (ref 27–34)
MCHC RBC-ENTMCNC: 27.9 PG — SIGNIFICANT CHANGE UP (ref 27–34)
MCHC RBC-ENTMCNC: 30.6 GM/DL — LOW (ref 32–36)
MCHC RBC-ENTMCNC: 31.4 GM/DL — LOW (ref 32–36)
MCV RBC AUTO: 88.6 FL — SIGNIFICANT CHANGE UP (ref 80–100)
MCV RBC AUTO: 90.8 FL — SIGNIFICANT CHANGE UP (ref 80–100)
METHOD TYPE: SIGNIFICANT CHANGE UP
NRBC # BLD: 0 /100 WBCS — SIGNIFICANT CHANGE UP (ref 0–0)
NRBC # BLD: 0 /100 WBCS — SIGNIFICANT CHANGE UP (ref 0–0)
ORGANISM # SPEC MICROSCOPIC CNT: SIGNIFICANT CHANGE UP
PHOSPHATE SERPL-MCNC: 2.4 MG/DL — LOW (ref 2.5–4.5)
PHOSPHATE SERPL-MCNC: 2.6 MG/DL — SIGNIFICANT CHANGE UP (ref 2.5–4.5)
PHOSPHATE SERPL-MCNC: 3.8 MG/DL — SIGNIFICANT CHANGE UP (ref 2.5–4.5)
PHOSPHATE SERPL-MCNC: 5.5 MG/DL — HIGH (ref 2.5–4.5)
PLATELET # BLD AUTO: 178 K/UL — SIGNIFICANT CHANGE UP (ref 150–400)
PLATELET # BLD AUTO: 185 K/UL — SIGNIFICANT CHANGE UP (ref 150–400)
POTASSIUM SERPL-MCNC: 4.5 MMOL/L — SIGNIFICANT CHANGE UP (ref 3.5–5.3)
POTASSIUM SERPL-MCNC: 4.5 MMOL/L — SIGNIFICANT CHANGE UP (ref 3.5–5.3)
POTASSIUM SERPL-MCNC: 4.6 MMOL/L — SIGNIFICANT CHANGE UP (ref 3.5–5.3)
POTASSIUM SERPL-MCNC: 4.7 MMOL/L — SIGNIFICANT CHANGE UP (ref 3.5–5.3)
POTASSIUM SERPL-SCNC: 4.5 MMOL/L — SIGNIFICANT CHANGE UP (ref 3.5–5.3)
POTASSIUM SERPL-SCNC: 4.5 MMOL/L — SIGNIFICANT CHANGE UP (ref 3.5–5.3)
POTASSIUM SERPL-SCNC: 4.6 MMOL/L — SIGNIFICANT CHANGE UP (ref 3.5–5.3)
POTASSIUM SERPL-SCNC: 4.7 MMOL/L — SIGNIFICANT CHANGE UP (ref 3.5–5.3)
PROT SERPL-MCNC: 6.6 G/DL — SIGNIFICANT CHANGE UP (ref 6–8.3)
PROT SERPL-MCNC: 6.7 G/DL — SIGNIFICANT CHANGE UP (ref 6–8.3)
PROTHROM AB SERPL-ACNC: 11.4 SEC — SIGNIFICANT CHANGE UP (ref 10.6–13.6)
RBC # BLD: 3.71 M/UL — LOW (ref 3.8–5.2)
RBC # BLD: 3.77 M/UL — LOW (ref 3.8–5.2)
RBC # FLD: 16.9 % — HIGH (ref 10.3–14.5)
RBC # FLD: 17.1 % — HIGH (ref 10.3–14.5)
SODIUM SERPL-SCNC: 130 MMOL/L — LOW (ref 135–145)
SODIUM SERPL-SCNC: 131 MMOL/L — LOW (ref 135–145)
SODIUM SERPL-SCNC: 133 MMOL/L — LOW (ref 135–145)
SODIUM SERPL-SCNC: 134 MMOL/L — LOW (ref 135–145)
SPECIMEN SOURCE: SIGNIFICANT CHANGE UP
TRIGL SERPL-MCNC: 326 MG/DL — HIGH
WBC # BLD: 32.42 K/UL — HIGH (ref 3.8–10.5)
WBC # BLD: 35.5 K/UL — HIGH (ref 3.8–10.5)
WBC # FLD AUTO: 32.42 K/UL — HIGH (ref 3.8–10.5)
WBC # FLD AUTO: 35.5 K/UL — HIGH (ref 3.8–10.5)

## 2020-11-30 PROCEDURE — 99291 CRITICAL CARE FIRST HOUR: CPT

## 2020-11-30 PROCEDURE — 99255 IP/OBS CONSLTJ NEW/EST HI 80: CPT

## 2020-11-30 PROCEDURE — 99233 SBSQ HOSP IP/OBS HIGH 50: CPT | Mod: GC

## 2020-11-30 RX ORDER — MEROPENEM 1 G/30ML
1000 INJECTION INTRAVENOUS EVERY 8 HOURS
Refills: 0 | Status: DISCONTINUED | OUTPATIENT
Start: 2020-11-30 | End: 2020-11-30

## 2020-11-30 RX ORDER — CEFAZOLIN SODIUM 1 G
2000 VIAL (EA) INJECTION EVERY 12 HOURS
Refills: 0 | Status: DISCONTINUED | OUTPATIENT
Start: 2020-11-30 | End: 2020-12-03

## 2020-11-30 RX ORDER — KETAMINE HYDROCHLORIDE 100 MG/ML
0.25 INJECTION INTRAMUSCULAR; INTRAVENOUS
Qty: 1000 | Refills: 0 | Status: DISCONTINUED | OUTPATIENT
Start: 2020-11-30 | End: 2020-12-06

## 2020-11-30 RX ORDER — MIDAZOLAM HYDROCHLORIDE 1 MG/ML
0.08 INJECTION, SOLUTION INTRAMUSCULAR; INTRAVENOUS
Qty: 100 | Refills: 0 | Status: DISCONTINUED | OUTPATIENT
Start: 2020-11-30 | End: 2020-12-06

## 2020-11-30 RX ORDER — BACITRACIN ZINC 500 UNIT/G
1 OINTMENT IN PACKET (EA) TOPICAL
Refills: 0 | Status: ACTIVE | OUTPATIENT
Start: 2020-11-30 | End: 2021-10-29

## 2020-11-30 RX ORDER — CEFTRIAXONE 500 MG/1
2000 INJECTION, POWDER, FOR SOLUTION INTRAMUSCULAR; INTRAVENOUS EVERY 24 HOURS
Refills: 0 | Status: DISCONTINUED | OUTPATIENT
Start: 2020-11-30 | End: 2020-11-30

## 2020-11-30 RX ORDER — INSULIN LISPRO 100/ML
VIAL (ML) SUBCUTANEOUS EVERY 6 HOURS
Refills: 0 | Status: ACTIVE | OUTPATIENT
Start: 2020-11-30 | End: 2021-10-29

## 2020-11-30 RX ORDER — CEFAZOLIN SODIUM 1 G
1000 VIAL (EA) INJECTION ONCE
Refills: 0 | Status: COMPLETED | OUTPATIENT
Start: 2020-11-30 | End: 2020-11-30

## 2020-11-30 RX ORDER — POLYETHYLENE GLYCOL 3350 17 G/17G
17 POWDER, FOR SOLUTION ORAL EVERY 12 HOURS
Refills: 0 | Status: ACTIVE | OUTPATIENT
Start: 2020-11-30 | End: 2021-10-29

## 2020-11-30 RX ORDER — CEFAZOLIN SODIUM 1 G
VIAL (EA) INJECTION
Refills: 0 | Status: DISCONTINUED | OUTPATIENT
Start: 2020-11-30 | End: 2020-11-30

## 2020-11-30 RX ORDER — CEFAZOLIN SODIUM 1 G
1000 VIAL (EA) INJECTION EVERY 8 HOURS
Refills: 0 | Status: DISCONTINUED | OUTPATIENT
Start: 2020-11-30 | End: 2020-11-30

## 2020-11-30 RX ORDER — NYSTATIN CREAM 100000 [USP'U]/G
1 CREAM TOPICAL
Refills: 0 | Status: ACTIVE | OUTPATIENT
Start: 2020-11-30 | End: 2021-10-29

## 2020-11-30 RX ADMIN — Medication 12.8 MICROGRAM(S)/KG/MIN: at 21:32

## 2020-11-30 RX ADMIN — Medication 10 MILLIGRAM(S): at 17:24

## 2020-11-30 RX ADMIN — MIDAZOLAM HYDROCHLORIDE 2.72 MG/KG/HR: 1 INJECTION, SOLUTION INTRAMUSCULAR; INTRAVENOUS at 21:32

## 2020-11-30 RX ADMIN — MEROPENEM 100 MILLIGRAM(S): 1 INJECTION INTRAVENOUS at 05:02

## 2020-11-30 RX ADMIN — HEPARIN SODIUM 21 UNIT(S)/HR: 5000 INJECTION INTRAVENOUS; SUBCUTANEOUS at 00:09

## 2020-11-30 RX ADMIN — CISATRACURIUM BESYLATE 24.5 MICROGRAM(S)/KG/MIN: 2 INJECTION INTRAVENOUS at 21:32

## 2020-11-30 RX ADMIN — Medication 1 APPLICATION(S): at 17:02

## 2020-11-30 RX ADMIN — CHLORHEXIDINE GLUCONATE 15 MILLILITER(S): 213 SOLUTION TOPICAL at 05:04

## 2020-11-30 RX ADMIN — MIDODRINE HYDROCHLORIDE 10 MILLIGRAM(S): 2.5 TABLET ORAL at 05:54

## 2020-11-30 RX ADMIN — MIDODRINE HYDROCHLORIDE 10 MILLIGRAM(S): 2.5 TABLET ORAL at 21:32

## 2020-11-30 RX ADMIN — PANTOPRAZOLE SODIUM 40 MILLIGRAM(S): 20 TABLET, DELAYED RELEASE ORAL at 13:13

## 2020-11-30 RX ADMIN — HUMAN INSULIN 10 UNIT(S): 100 INJECTION, SUSPENSION SUBCUTANEOUS at 17:21

## 2020-11-30 RX ADMIN — Medication 2 PUFF(S): at 13:30

## 2020-11-30 RX ADMIN — CHLORHEXIDINE GLUCONATE 1 APPLICATION(S): 213 SOLUTION TOPICAL at 05:04

## 2020-11-30 RX ADMIN — ALBUTEROL 4 PUFF(S): 90 AEROSOL, METERED ORAL at 17:35

## 2020-11-30 RX ADMIN — Medication 2 PUFF(S): at 05:11

## 2020-11-30 RX ADMIN — Medication 10 MILLIGRAM(S): at 23:42

## 2020-11-30 RX ADMIN — Medication 10 MILLIGRAM(S): at 13:12

## 2020-11-30 RX ADMIN — MIDODRINE HYDROCHLORIDE 10 MILLIGRAM(S): 2.5 TABLET ORAL at 13:12

## 2020-11-30 RX ADMIN — KETAMINE HYDROCHLORIDE 3.4 MG/KG/HR: 100 INJECTION INTRAMUSCULAR; INTRAVENOUS at 21:32

## 2020-11-30 RX ADMIN — ALBUTEROL 4 PUFF(S): 90 AEROSOL, METERED ORAL at 05:11

## 2020-11-30 RX ADMIN — Medication 6 MILLIGRAM(S): at 05:03

## 2020-11-30 RX ADMIN — CEFTRIAXONE 100 MILLIGRAM(S): 500 INJECTION, POWDER, FOR SOLUTION INTRAMUSCULAR; INTRAVENOUS at 10:40

## 2020-11-30 RX ADMIN — Medication 100 MILLIGRAM(S): at 18:42

## 2020-11-30 RX ADMIN — HUMAN INSULIN 10 UNIT(S): 100 INJECTION, SUSPENSION SUBCUTANEOUS at 05:03

## 2020-11-30 RX ADMIN — KETAMINE HYDROCHLORIDE 3.4 MG/KG/HR: 100 INJECTION INTRAMUSCULAR; INTRAVENOUS at 13:13

## 2020-11-30 RX ADMIN — NYSTATIN CREAM 1 APPLICATION(S): 100000 CREAM TOPICAL at 17:24

## 2020-11-30 RX ADMIN — Medication 10 MILLIGRAM(S): at 05:03

## 2020-11-30 RX ADMIN — Medication 100 MILLIGRAM(S): at 13:12

## 2020-11-30 RX ADMIN — Medication 1: at 17:21

## 2020-11-30 RX ADMIN — ALBUTEROL 4 PUFF(S): 90 AEROSOL, METERED ORAL at 13:30

## 2020-11-30 RX ADMIN — CHLORHEXIDINE GLUCONATE 15 MILLILITER(S): 213 SOLUTION TOPICAL at 17:02

## 2020-11-30 RX ADMIN — HUMAN INSULIN 10 UNIT(S): 100 INJECTION, SUSPENSION SUBCUTANEOUS at 10:36

## 2020-11-30 RX ADMIN — POLYETHYLENE GLYCOL 3350 17 GRAM(S): 17 POWDER, FOR SOLUTION ORAL at 17:24

## 2020-11-30 RX ADMIN — Medication 2: at 10:41

## 2020-11-30 RX ADMIN — Medication 1 APPLICATION(S): at 05:03

## 2020-11-30 RX ADMIN — Medication 2 PUFF(S): at 17:35

## 2020-11-30 RX ADMIN — POLYETHYLENE GLYCOL 3350 17 GRAM(S): 17 POWDER, FOR SOLUTION ORAL at 05:02

## 2020-11-30 NOTE — PROCEDURE NOTE - NSPOSTPRCRAD_GEN_A_CORE
depth of insertion/no pneumothorax/post-procedure radiography performed/central line located in the superior vena cava

## 2020-11-30 NOTE — CONSULT NOTE ADULT - ASSESSMENT
ASSESSMENT:    Antimicrobials and Steroids in this admission:  Cefazolin 11/30 ->  Ceftriaxone 11/26-11/30  Meropenem 11/28-11/30  Vancomycin 11/27-11/28  Remdesivir 11/18-11/22  Dexamethasone 11/18-11/30    RECOMMENDATIONS:    ROBERT Marks MD  Fellow, Infectious Diseases  Pager: [phone]  If no response, after 5pm and on Weekends: Call [phone] 60/F with PMH HTN, DVT/PE in past, ?Asthma (likely 2/2 PE? 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  Admitted 11/19 with productive cough (with clear sputum) X 9 days, SOB X 9 days, diarrhea X 7 days,  fever (Tmax 104F) X 9 days.  Covid-19 positive, CT Chest with b/l GGOs.  Hosp course c/b new DM diagnosis (Hba1c 7.1); desaturations while on NRB; RRT 11/21 and 11/23, MICU txf 11/23; ARTEMIO f/b CRRT initiation 11/28.  Blood cx with MSSA and P. mirablis, Sputum cx with MSSA. ID consulted for recs.  ==========  COVID-19 in new DM with MSSA/P. mirablis bacteremia and MSSA in sputum  - MSSA likely 2/2 lung source v/s line-associated  - Unclear source of P. mirablis - potentially 2/2 urine v/s line-associated  - 11/23 Rt femoral TLC placed. 11/28 Rt IJV HD catheter inserted. 11/30 Lt IJV TLC inserted. Rivera replaced 11/30  - continue Cefazolin, while holding Dexamethasone    Antimicrobials and Steroids in this admission:  Cefazolin 11/30 ->  Ceftriaxone 11/26-11/30  Meropenem 11/28-11/30  Vancomycin 11/27-11/28  Remdesivir 11/18-11/22  Dexamethasone 11/18-11/30    Abx allergy - unclear reaction to PCN  - tolerated Ceftriaxone, Cefepime and Cefazolin in this admission    RECOMMENDATIONS:  1. MSSA bacteremia  - continue Cefazolin, switch to Cefazolin 2g IV q12h  - check repeat blood cx X 2 sets with AM labs  - check TTE later this week  - consider line change (particularly rt HD catheter) when feasible  ---------  2. P. mirablis bacteremia  - continue Cefazolin 2g IV q12h  - check repeat UA and urine cx  - check repeat blood cx X 2 sets with AM labs  ----------  3. COVID-19  - please hold further Dexamethasone (in setting of active bacteremia)  - Continue supplemental O2 and supportive care per primary team  - Continue Contact and Airborne Isolation precautions  ----------  4. Leucocytosis  - likely 2/2 active bacteremias v/s ongoing steroid use  - trend daily CBCs  ----------  5. Antibiotic allergy  - uncertain reaction - tolerated Ceftriaxone, Cefepime and Cefazolin in this admission  - outpatient  follow-up with Allergy/Immunology    Recs conveyed to primary ICU team. Patient is critically ill    ROBERT Marks, MD  Fellow, Infectious Diseases  Pager: [phone]  If no response, after 5pm and on Weekends: Call [phone]

## 2020-11-30 NOTE — PHARMACOTHERAPY INTERVENTION NOTE - COMMENTS
MIGUEL A AGUILA, 60y Female with persistent MSSA bacteremia, also with Proteus mirabilis bacteremia from 11/27 cultures, was changed to ceftriaxone 2 grams daily. As per hospital policy, the patient will require an Infectious Diseases consult.    Recommendation(s):  1) Would suggest D/C ceftriaxone and start cefazolin 1 gram IV Q8H (preferred for MSSA bacteremia, also covers the Proteus) since patient is on CRRT  2) Repeat blood cultures Q48H x 2 sets until clearance  3) If CRRT discontinued, would monitor renal function to determine abx dosing    With kind regards,  Dave Hayes, EkaterinaD  Infectious Diseases Clinical Pharmacist  .

## 2020-11-30 NOTE — PROGRESS NOTE ADULT - ATTENDING COMMENTS
1. Acute hypoxemic respiratory with ARDS from SARS 2, Covid -19  pneumonia. Pt also with morbid obesity with BMI~58. Continue lung protective ventilation. Decrease FIO2 to 55. Continue PEEP 16. Stop decadron.  2..Id. S. Aureus ,S. Hominus, and Proteus bacteremia. Continue Cefazolin.  3. Renal failure from ATN. Continue CVVHD with fluid removal.  4. Hypotension from septic shock. Still dependent on norepinephrine.

## 2020-11-30 NOTE — PROGRESS NOTE ADULT - ASSESSMENT
Pt is a 59 y/o F w PMH of AARON, HTN, HLD, prior DVT/PE hx, asthma presented to [facility] for SOB and productive cough prior to admission. Admitted for acute hypoxic respiratory failure 2/2 to COVID infection. Was subsequently intubated and proned on 11/23//20. Was transferred to ICU for further management. Was started on pressors and nimbex drip. Nephrology consulted for ARTEMIO    #ARTEMIO  Pt with ARTEMIO in the setting of septic shock 2/2 to COVID infection. Now likely in ATN. Noted to have Scr of 0.53 on 11/23/20, then Scr further increased to 1.64 on 11/24/20, 2.18 on 11/25/20 and then further to 2.57 on 11/26/20 and 3.0 on 11/27. Patient was started on CRRT and patient now anuric. Adequate clearances noted on CRRT, tolerating well with UF. Phos low-> will change solution to phoxillum. Monitor labs and urine output. Avoid NSAIDs, ACEI/ARBS, RCA and nephrotoxins. Dose medications as per CRRT

## 2020-11-30 NOTE — CONSULT NOTE ADULT - SUBJECTIVE AND OBJECTIVE BOX
GENERAL SURGERY CONSULT NOTE  MIGUEL A AGUILA  |  [id number]  |  20 @ 12:05    CC: Patient is a 60y old  Female who presents with a chief complaint of Acute hypoxemic respiratory failure 2/2 covid19 (2020 11:41)    HPI: 60F PMH AARON, HTN, DVT/PE in the past, asthma, h/o SBO 2/2 incarcerated ventral hernia s/p exlap SBR (), hemicolectomy and ileostomy & reversal, p/w a 9 day hx of cough productive of clear sputum associated with feelings of SOB, 7 day hx of diarrhea (2-4 episodes per day) and 1 day hx of fever. Patient admitted with COVID pneumonia. Course c/b worsening SOB and hypoxia not improved by CPAP. Patient was given remdesivir and decadron. RRT (), patient started on BIPAP for tachypnea, hypoxia worsened, RRT () for worsening hypoxic respiratory failure, patient intubated and transferred to 5ICU. Patient's course thus far c/b worsening COVID PNA, staph PNA (sputum + staph), bacteremia, worsening P/F ratio (failed proning), worsening renal function started on CVVHD.     INTERVAL EVENTS: General surgery consulted for evaluation of abdomen.    REVIEW OF SYSTEMS:  unable to obtain    PAST MEDICAL & SURGICAL HISTORY:  Pneumomediastinum  Umbilical hernia  Osteoarthritis   AARON (Obstructive Sleep Apnea)  Pneumonia  GERD (Gastroesophageal Reflux Disease  Asthma  DVT (Deep Venous Thrombosis)  HTN (Hypertension)  H/O ileostomy, Ileostomy Revesal   S/P LEEP 2000  S/P Exploratory Laparotomy  colon resection and ileostomy  S/P  Section 2010  History of Tubal Ligation s/p c/section  S/P Knee Surgery    MEDICATIONS  (STANDING):  ALBUTerol    90 MICROgram(s) HFA Inhaler 4 Puff(s) Inhalation every 6 hours  ceFAZolin   IVPB 1000 milliGRAM(s) IV Intermittent once  ceFAZolin   IVPB 1000 milliGRAM(s) IV Intermittent every 8 hours  ceFAZolin   IVPB      chlorhexidine 0.12% Liquid 15 milliLiter(s) Oral Mucosa every 12 hours  chlorhexidine 4% Liquid 1 Application(s) Topical <User Schedule>  chlorhexidine 4% Liquid 1 Application(s) Topical <User Schedule>  cisatracurium Infusion 3 MICROgram(s)/kG/Min (24.5 mL/Hr) IV Continuous <Continuous>  CRRT Treatment    <Continuous>  dexAMETHasone  Injectable 6 milliGRAM(s) IV Push daily  dextrose 40% Gel 15 Gram(s) Oral once  dextrose 5%. 1000 milliLiter(s) (50 mL/Hr) IV Continuous <Continuous>  dextrose 5%. 1000 milliLiter(s) (100 mL/Hr) IV Continuous <Continuous>  dextrose 50% Injectable 25 Gram(s) IV Push once  dextrose 50% Injectable 12.5 Gram(s) IV Push once  dextrose 50% Injectable 25 Gram(s) IV Push once  glucagon  Injectable 1 milliGRAM(s) IntraMuscular once  heparin  Infusion 1900 Unit(s)/Hr (21 mL/Hr) IV Continuous <Continuous>  insulin lispro (ADMELOG) corrective regimen sliding scale   SubCutaneous every 6 hours  insulin NPH human recombinant 10 Unit(s) SubCutaneous every 6 hours  ipratropium 17 MICROgram(s) HFA Inhaler 2 Puff(s) Inhalation every 6 hours  metoclopramide Injectable 10 milliGRAM(s) IV Push every 6 hours  midazolam Infusion 0.02 mG/kG/Hr (2.72 mL/Hr) IV Continuous <Continuous>  midodrine 10 milliGRAM(s) Oral every 8 hours  norepinephrine Infusion 0.05 MICROgram(s)/kG/Min (12.8 mL/Hr) IV Continuous <Continuous>  pantoprazole  Injectable 40 milliGRAM(s) IV Push daily  petrolatum Ophthalmic Ointment 1 Application(s) Both EYES two times a day  polyethylene glycol 3350 17 Gram(s) Oral every 12 hours  PrismaSATE Dialysate BGK 4 / 2.5 5000 milliLiter(s) (3000 mL/Hr) CRRT <Continuous>  PrismaSOL Filtration BGK 4 / 2.5 5000 milliLiter(s) (1000 mL/Hr) CRRT <Continuous>  PrismaSOL Filtration BGK 4 / 2.5 5000 milliLiter(s) (200 mL/Hr) CRRT <Continuous>  propofol Infusion 50 MICROgram(s)/kG/Min (40.8 mL/Hr) IV Continuous <Continuous>    MEDICATIONS  (PRN):  acetaminophen    Suspension .. 650 milliGRAM(s) Oral every 6 hours PRN Temp greater or equal to 38C (100.4F)  sodium chloride 0.9% lock flush 10 milliLiter(s) IV Push every 1 hour PRN Pre/post blood products, medications, blood draw, and to maintain line patency    Allergies  Kiwi (Unknown)  latex (Anaphylaxis)  latex (Unknown)  penicillin (Other)  penicillin (Unknown)  perfume  hives (Other)  potassium acetate (Other)  soap additives hives (Other)    SOCIAL HISTORY: lives with     FAMILY HISTORY: noncontributory    Objective:   Vital Signs Last 24 Hrs  T(C): 37.6 (2020 08:00), Max: 37.7 (2020 22:45)  T(F): 99.7 (2020 08:00), Max: 99.9 (2020 22:45)  HR: 93 (2020 11:45) (77 - 102)  BP: --  BP(mean): --  RR: 30 (2020 12:00) (30 - 30)  SpO2: 95% (2020 08:30) (88% - 100%)    Physical Exam:  General: NAD, intubated, sedated, paralyzed  CV: regular rhythm  Pulm: mechanical ventilation   Abdomen: obese, soft, nondistended, reducible large ventral hernia, well healed prior midline surgical scar, small punctate scab (~2mm) in right hemiabdomen without evidence of infection; umbilical fold without evidence of prior wound breakdown, no discharge, no signs of infection  Extremities: warm and well perfused      LABS:                        10.5   35.50 )-----------( 185      ( 2020 05:11 )             33.4     11-30    133<L>  |  99  |  24<H>  ----------------------------<  179<H>  4.5   |  21<L>  |  1.12    Ca    8.6      2020 05:11  Phos  2.4     11-30  Mg     2.7     11-30    TPro  6.7  /  Alb  2.8<L>  /  TBili  1.7<H>  /  DBili  x   /  AST  24  /  ALT  23  /  AlkPhos  69  11-30    PT/INR - ( 2020 05:11 )   PT: 11.4 sec;   INR: 0.95 ratio         PTT - ( 2020 05:11 )  PTT:64.1 sec  LIVER FUNCTIONS - ( 2020 05:11 )  Alb: 2.8 g/dL / Pro: 6.7 g/dL / ALK PHOS: 69 U/L / ALT: 23 U/L / AST: 24 U/L / GGT: x             ABG - ( 2020 05:06 )  pH, Arterial: 7.34  pH, Blood: x     /  pCO2: 44    /  pO2: 82    / HCO3: 23    / Base Excess: -1.8  /  SaO2: 95                      RADIOLOGY & ADDITIONAL STUDIES:  PACS Image: Image(s) Available (20 @ 13:08)

## 2020-11-30 NOTE — CONSULT NOTE ADULT - REASON FOR ADMISSION
Acute hypoxemic respiratory failure 2/2 covid19

## 2020-11-30 NOTE — CONSULT NOTE ADULT - CONSULT REASON
Acute hypoxemic respiratory failure 2/2 COVID-19
ARTEMIO
MSSA and P. mirablis bacteremia, COVID-19
wound

## 2020-11-30 NOTE — CONSULT NOTE ADULT - ATTENDING COMMENTS
60F Hx PE/ DVT on XLT, M. Obesity/ AARON on CPAP admitted 11/18 for SARS-COV2 PNA with Hypoxic Respiratory Failure on BiPAP Support.  - A & O x 3 and FC x 4 with   - No New Complaint or Chest Discomfort/ Clinical Distress  - BiPAP Support 15/7 FiO2 100% SpO2 89% answering questions appropriately   - Already started on Remdesivir and Dexamethasone   - PE and DVT on AC   - No Intubation/ Impending Respiratory Failure and need Prone Ventilation Trial   - Discussed with PMD Service     Patient seen and examined with ICU Resident/Fellow at bedside after lab data, medical records and radiology reports reviewed. I have read and agreeable in general with resident's Documented Note, Assessment and Management Plan which reflected my opinions from bedside round and discussion.
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 - used symbicort intermittently), AARON, peritonitis s/p Oral's procedure and ileostomy (reversed 2011) admitted with COVID19    Developed MSSA Bacteremia  Sputum culture with MSSA (likely PNA as initial source)  BCx also with Proteus (Cefazolins suceptible)  Would check U/A and UCx (although now s/p antibiotics)  Would adjust cefazolin dosing to 2g IV Q12H  Would repeat TTE later this week  Would sent repeat BCx  Will need line replacement as patient was actively bacteremia at time of currently line placement    I will continue to follow. Please feel free to contact me with any further questions.    Femi Adkins M.D.  [facility] Division of Infectious Disease  8AM-5PM: Pager Number [phone]  After Hours (or if no response): Please contact the Infectious Diseases Office at [phone]     The above assessment and plan were discussed with MICU Team
#ARTEMIO- nonoliguric likely ATN in the setting of hypotension and RRT on 11/23  -monitor bun/cr trend  -cont negro and monitor UO  -agree with albumin, if no improvemet DC  - AVOID IVF due to ARDS; in fact, patient may need diuretics if pulm status worsens  -repeat u lytes; check cpk  # hypotension- pressors per micu  # acute hypoxic respiratory failure/ARDS/PNA  -fio2 70%, peep 18  -monitor oxygenation; if worsens may need diuretics  -chest xray viewed  #covid 19 infection- s/p remdesivir and currently on dexa;  Reviewed all inpatient chart notes, vitals, vent settings, and imaging

## 2020-11-30 NOTE — CONSULT NOTE ADULT - SUBJECTIVE AND OBJECTIVE BOX
Patient is a 60y old  Female who presents with a chief complaint of Acute hypoxemic respiratory failure 2/2 covid19 (2020 13:12)    HPI:  60F w/pmhx AARON, HTN, 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 Patient reports that she also had a general feeling of "uneasiness" that she could not explain for the last week. No chest pain. No pleuritic pain. No leg swelling or pain. Denies myalgias. Denies sick contacts. Reports that her  and son live with her and they have tested so far negative for covid and are asymptomatic, patient is not sure where was exposed. Reports trying her symbicort this week for shortness of breath without much relief. Patient yesterday began to have fevers with highest fever at 104F which alarmed her and for that reason she came to the hospital for help.  (2020 05:46)  ==========  - Admitted   -  desaturating on NRB when prone -> placed on CPAP  - T2DM newly diagnosed (HbA1c 7.1)  -  RRT called for hypoxia in the 70s while on the NRB. On arrival, patient was on CPAP setting and was changed to bipap . Patient endorsed not feeling as sob however was pulling good volumes on the bipap but remained tachypneic. BiPAP settings increased to 15/7  -  MICU consulted - recc proning  -  RRT called for recurring hypoxia (spo2 mid-80s), remained with RR 30s-40s (which patient has been during hospital course).Intubated and transferred to 5 ICU  -  Rt femoral TLC and rt femoral A-line. Rivera placed  -  proned 20 @ 12:00 PM. Tolerated prone well with no issues. To remain proned x 18 hours.  -  Nephrology consulted for ARTEMIO - ARTEMIO in the setting of septic shock 2/2 to COVID infection. Now likely in ATN  -  Left IJV HD catheter inserted and started on CRRT  -  Rt IJV HD catheter inserted  -  Surgery consulted for evaluation of abdomen  - Blood cx with MSSA, Sputum cx with MSSA and P. mirablis. ID consulted for recs     prior hospital charts reviewed [  ]  primary team notes reviewed [  ]  other consultant notes reviewed [  ]    PAST MEDICAL & SURGICAL HISTORY:  Pneumomediastinum  Umbilical hernia  Reduciable  Osteoarthritis of both knees, unspecified osteoarthritis type  AARON (Obstructive Sleep Apnea)  category II pt uses c/pap pt to bering to hospital/  OR booking notified  Pneumonia   GERD (Gastroesophageal Reflux Disease)  Asthma - diagnosed   on adbvair denies attacks  DVT (Deep Venous Thrombosis)  left leg 6 m s/p knee replacement in   HTN (Hypertension)  H/O ileostomy  Ileostomy Revesal     S/P LEEP  2000    S/P Exploratory Laparotomy    peritonitis  s/p right knee replacement/ colon resection and ileostomy    S/P Colonoscopy      S/P Endoscopy   gerd    S/P  Section      History of Tubal Ligation  s/p c/section     S/P Knee Surgery  2010      Allergies  Kiwi (Unknown)  latex (Anaphylaxis)  latex (Unknown)  penicillin (Other)  penicillin (Unknown)  perfume  hives (Other)  potassium acetate (Other)  soap additives hives (Other)    ANTIMICROBIALS (past 90 days)  MEDICATIONS  (STANDING):  ceFAZolin   IVPB   100 mL/Hr IV Intermittent (20 @ 13:12)    cefTRIAXone   IVPB   100 mL/Hr IV Intermittent (20 @ 10:40)    cefTRIAXone   IVPB   100 mL/Hr IV Intermittent (20 @ 20:50)   100 mL/Hr IV Intermittent (20 @ 21:00)    meropenem  IVPB   100 mL/Hr IV Intermittent (20 @ 17:04)   100 mL/Hr IV Intermittent (20 @ 05:04)   100 mL/Hr IV Intermittent (20 @ 15:19)    meropenem  IVPB   100 mL/Hr IV Intermittent (20 @ 05:02)    remdesivir  IVPB   500 mL/Hr IV Intermittent (20 @ 11:00)    remdesivir  IVPB   500 mL/Hr IV Intermittent (20 @ 17:48)   500 mL/Hr IV Intermittent (20 @ 18:33)   500 mL/Hr IV Intermittent (20 @ 17:31)   500 mL/Hr IV Intermittent (20 @ 19:00)    vancomycin  IVPB   250 mL/Hr IV Intermittent (20 @ 21:49)    vancomycin  IVPB   250 mL/Hr IV Intermittent (20 @ 21:08)   250 mL/Hr IV Intermittent (20 @ 10:19)      ANTIMICROBIALS:    ceFAZolin   IVPB 1000 every 8 hours  ceFAZolin   IVPB      OTHER MEDS: MEDICATIONS  (STANDING):  acetaminophen    Suspension .. 650 every 6 hours PRN  ALBUTerol    90 MICROgram(s) HFA Inhaler 4 every 6 hours  cisatracurium Infusion 3 <Continuous>  dextrose 40% Gel 15 once  dextrose 50% Injectable 25 once  dextrose 50% Injectable 12.5 once  dextrose 50% Injectable 25 once  glucagon  Injectable 1 once  heparin  Infusion 1900 <Continuous>  insulin lispro (ADMELOG) corrective regimen sliding scale  every 6 hours  insulin NPH human recombinant 10 every 6 hours  ipratropium 17 MICROgram(s) HFA Inhaler 2 every 6 hours  ketamine Infusion. 0.25 <Continuous>  metoclopramide Injectable 10 every 6 hours  midazolam Infusion 0.02 <Continuous>  midodrine 10 every 8 hours  norepinephrine Infusion 0.05 <Continuous>  pantoprazole  Injectable 40 daily  polyethylene glycol 3350 17 every 12 hours  propofol Infusion 50 <Continuous>    SOCIAL HISTORY:   hx smoking  non-smoker    FAMILY HISTORY:  Family history of gastric cancer    Family history of breast cancer (Grandparent)      REVIEW OF SYSTEMS  [  ] ROS unobtainable because:    [  ] All other systems negative except as noted below:	    Constitutional:  [ ] fever [ ] chills  [ ] weight loss  [ ] weakness  Skin:  [ ] rash [ ] phlebitis	  Eyes: [ ] icterus [ ] pain  [ ] discharge	  ENMT: [ ] sore throat  [ ] thrush [ ] ulcers [ ] exudates  Respiratory: [ ] dyspnea [ ] hemoptysis [ ] cough [ ] sputum	  Cardiovascular:  [ ] chest pain [ ] palpitations [ ] edema	  Gastrointestinal:  [ ] nausea [ ] vomiting [ ] diarrhea [ ] constipation [ ] pain	  Genitourinary:  [ ] dysuria [ ] frequency [ ] hematuria [ ] discharge [ ] flank pain  [ ] incontinence  Musculoskeletal:  [ ] myalgias [ ] arthralgias [ ] arthritis  [ ] back pain  Neurological:  [ ] headache [ ] seizures  [ ] confusion/altered mental status  Psychiatric:  [ ] anxiety [ ] depression	  Hematology/Lymphatics:  [ ] lymphadenopathy  Endocrine:  [ ] adrenal [ ] thyroid  Allergic/Immunologic:	 [ ] transplant [ ] seasonal    Vital Signs Last 24 Hrs  T(F): 99.9 (20 @ 12:00), Max: 109.4 (20 @ 22:15)  Vital Signs Last 24 Hrs  HR: 64 (20 @ 16:45) (64 - 102)  BP: --  RR: 30 (20 @ 14:45)  SpO2: 93% (20 @ 16:45) (92% - 100%)  Wt(kg): --    EXAM:  Constitutional: Not in acute distress  Eyes: pupils bilaterally reactive to light. No icterus.  Oral cavity: Clear, no lesions  Neck: No neck vein distension noted  RS: Chest clear to auscultation bilaterally. No wheeze/rhonchi/crepitations.  CVS: S1, S2 heard. Regular rate and rhythm. No murmurs/rubs/gallops.  Abdomen: Soft. No guarding/rigidity/tenderness.  : No acute abnormalities  Extremities: Warm. No pedal edema  Skin: No lesions noted  Vascular: No evidence of phlebitis  Neuro: Alert, oriented to time/place/person                          10.5   35.50 )-----------( 185      ( 2020 05:11 )             33.4         130<L>  |  96  |  26<H>  ----------------------------<  217<H>  4.5   |  21<L>  |  1.05    Ca    8.6      2020 13:39  Phos  3.8       Mg     2.7         TPro  6.7  /  Alb  2.8<L>  /  TBili  1.7<H>  /  DBili  x   /  AST  24  /  ALT  23  /  AlkPhos  69      MICROBIOLOGY:Vancomycin Level, Trough: 21.5 ( @ 08:10)    Culture - Blood (collected 2020 18:19)  Source: .Blood Blood-Peripheral  Gram Stain (2020 15:25):    Growth in aerobic bottle: Gram Positive Cocci in Clusters    Growth in anaerobic bottle: Gram Positive Cocci in Clusters  Final Report (2020 16:37):    Growth in aerobic and anaerobic bottles: Staphylococcus aureus    See previous culture [id number]    Culture - Blood (collected 2020 18:19)  Source: .Blood Blood-Peripheral  Gram Stain (2020 13:32):    Growth in aerobic bottle: Gram Positive Cocci in Clusters    Growth in anaerobic bottle: Gram Positive Cocci in Clusters  Final Report (2020 10:15):    Growth in aerobic and anaerobic bottles: Staphylococcus aureus See    previous culture [id number]    Culture - Blood (collected 2020 05:21)  Source: .Blood Blood-Peripheral  Gram Stain (2020 22:18):    Growth in aerobic bottle: Gram Negative Rods    Growth in anaerobic bottle: Gram Positive Cocci in Clusters  Preliminary Report (2020 07:31):    Growth in anaerobic bottle: Gram Positive Cocci in Clusters    Growth in aerobic bottle: Proteus mirabilis    "Due to technical problems, Proteus sp. will Not be reported as part of    the BCID panel until further notice"    ***Blood Panel PCR results on this specimen are available    approximately 3 hours after the Gram stain result.***    Gram stain, PCR, and/or culture results may not always    correspond due to difference in methodologies.    ************************************************************    This PCR assay was performed using [employer]    The following targets are tested for: Enterococcus,    vancomycin resistant enterococci, Listeria monocytogenes,    coagulase negative staphylococci, S. aureus,    methicillin resistant S. aureus, Streptococcus agalactiae    (Group B), S. pneumoniae, S. pyogenes (Group A),    Acinetobacter baumannii, Enterobacter cloacae, E. coli,    Klebsiella oxytoca, K. pneumoniae, Proteus sp.,    Serratia marcescens, Haemophilus influenzae,    Neisseria meningitidis, Pseudomonas aeruginosa, Candida    albicans, C. glabrata, C krusei, C parapsilosis,    C. tropicalis and the KPC resistance gene.  Organism: Blood Culture PCR  Gram Negative Rods (2020 15:47)  Organism: Gram Negative Rods (2020 15:47)      -  Amikacin: S <=16      -  Ampicillin: S <=8 These ampicillin results predict results for amoxicillin      -  Ampicillin/Sulbactam: S <=4/2 Enterobacter, Citrobacter, and Serratia may develop resistance during prolonged therapy (3-4 days)      -  Aztreonam: S <=4      -  Cefazolin: S <=2 Enterobacter, Citrobacter, and Serratia may develop resistance during prolonged therapy (3-4 days)      -  Cefepime: S <=2      -  Cefoxitin: S <=8      -  Ceftriaxone: S <=1 Enterobacter, Citrobacter, and Serratia may develop resistance during prolonged therapy      -  Ciprofloxacin: S <=0.25      -  Ertapenem: S <=0.5      -  Gentamicin: S <=2      -  Levofloxacin: S <=0.5      -  Meropenem: S <=1      -  Piperacillin/Tazobactam: S <=8      -  Tobramycin: S <=2      -  Trimethoprim/Sulfamethoxazole: S <=0.5/9.5      Method Type: LONG  Organism: Blood Culture PCR (2020 12:36)      -  Acinetobacter baumanii: Nondet      -  Candida albicans: Nondet      -  Candida glabrata: Nondet      -  Candida krusei: Nondet      -  Candida parapsilosis: Nondet      -  Candida tropicalis: Nondet      -  Coagulase negative Staphylococcus: Nondet      -  Enterobacter cloacae complex: Nondet      -  Enterococcus species: Nondet      -  Escherichia coli: Nondet      -  Haemophilus influenzae: Nondet      -  Klebsiella oxytoca: Nondet      -  Klebsiella pneumoniae: Nondet      -  Listeria monocytogenes: Nondet      -  Methicillin resistant Staphylococcus aureus (MRSA): Nondet      -  Multidrug (KPC pos) resistant organism: Nondet      -  Neisseria meningitidis: Nondet      -  Pseudomonas aeruginosa: Nondet      -  Serratia marcescens: Nondet      -  Staphylococcus aureus: Nondet      -  Streptococcus agalactiae (Group B): Nondet      -  Streptococcus pneumoniae: Nondet      -  Streptococcus pyogenes (Group A): Nondet      -  Streptococcus sp. (Not Grp A, B or S pneumoniae): Nondet      -  Vancomycin resistant Enterococcus sp.: Nondet      Method Type: PCR    Culture - Blood (collected 2020 05:21)  Source: .Blood Blood-Peripheral  Gram Stain (2020 06:10):    Growth in aerobic bottle: Gram Positive Cocci in Clusters    Growth in anaerobic bottle: Gram Positive Cocci in Clusters  Final Report (2020 09:37):    Growth in aerobic and anaerobic bottles: Staphylococcus aureus    Growth in anaerobic bottle: Staphylococcus hominis    Coag Negative Staphylococcus    Single set isolate, possible contaminant. Contact    Microbiology if susceptibility testing clinically    indicated.    ***Blood Panel PCR results on this specimen are available    approximately 3 hours after the Gram stain result.***    Gram stain, PCR, and/or culture results may not always    correspond due to difference in methodologies.    ************************************************************    This PCR assay was performed using [employer]    The following targets are tested for: Enterococcus,    vancomycin resistant enterococci, Listeria monocytogenes,    coagulase negative staphylococci, S. aureus,    methicillin resistant S. aureus, Streptococcus agalactiae    (Group B), S. pneumoniae, S. pyogenes (Group A),    Acinetobacter baumannii, Enterobacter cloacae, E. coli,    Klebsiella oxytoca, K. pneumoniae, Proteus sp.,    Serratia marcescens, Haemophilus influenzae,    Neisseria meningitidis, Pseudomonas aeruginosa, Candida    albicans, C. glabrata, C krusei, C parapsilosis,    C. tropicalis and the KPC resistance gene.  Organism: Blood Culture PCR  Staphylococcus aureus (2020 09:37)  Organism: Staphylococcus aureus (2020 09:37)      -  Ampicillin/Sulbactam: S <=8/4      -  Cefazolin: S <=4      -  Clindamycin: R <=0.25 This isolate is presumed to be clindamycin resistant based on detection of inducible resistance. Clindamycin may still be effective in some patients.      -  Erythromycin: R >4      -  Gentamicin: S <=1 Should not be used as monotherapy      -  Oxacillin: S <=0.25      -  Penicillin: R 8      -  RIF- Rifampin: S <=1 Should not be used as monotherapy      -  Tetra/Doxy: S <=1      -  Trimethoprim/Sulfamethoxazole: S <=0.5/9.5      -  Vancomycin: S 1      Method Type: LONG  Organism: Blood Culture PCR (2020 09:37)      -  Staphylococcus aureus: Detec Any isolate of Staphylococcus aureus from a blood culture is NOT considered a contaminant.      Method Type: PCR          RADIOLOGY:  imaging below personally reviewed  < from: Xray Chest 1 View AP/PA (20 @ 13:08) >  The heart is enlarged. Diffuse space opacities seen throughout both lungs which remain unchanged when compared to previous study done 2020. Endotracheal tube is in good position. Changes compatible with pneumonia and or ARDS. NG tube is in the stomach however its tip is not seen on the current study. A central line is seen on the left and the tip is in the superior vena cava. No pneumothorax.  < end of copied text >    < from: CT Angio Chest w/ IV Cont (20 @ 00:49) >  IMPRESSION:  Suboptimal evaluation of the peripheral pulmonary arteries. No obvious central pulmonary embolus or secondary signs of right heart strain.  Commonly reported imaging features of COVID-19 pneumonia.  Additional findings as described.  < end of copied text >      OTHER TESTS:     Patient is a 60y old  Female who presents with a chief complaint of Acute hypoxemic respiratory failure 2/2 covid19 (2020 13:12)    HPI:  60F w/pmhx AARON, HTN, 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 Patient reports that she also had a general feeling of "uneasiness" that she could not explain for the last week. No chest pain. No pleuritic pain. No leg swelling or pain. Denies myalgias. Denies sick contacts. Reports that her  and son live with her and they have tested so far negative for covid and are asymptomatic, patient is not sure where was exposed. Reports trying her symbicort this week for shortness of breath without much relief. Patient yesterday began to have fevers with highest fever at 104F which alarmed her and for that reason she came to the hospital for help.  (2020 05:46)  ==========  - Admitted   -  desaturating on NRB when prone -> placed on CPAP  - T2DM newly diagnosed (HbA1c 7.1)  -  RRT called for hypoxia in the 70s while on the NRB. On arrival, patient was on CPAP setting and was changed to bipap . Patient endorsed not feeling as sob however was pulling good volumes on the bipap but remained tachypneic. BiPAP settings increased to 15/7  -  MICU consulted - recc proning  -  RRT called for recurring hypoxia (spo2 mid-80s), remained with RR 30s-40s (which patient has been during hospital course).Intubated and transferred to 5 ICU  -  Rt femoral TLC and rt femoral A-line. Rivera placed  -  proned 20 @ 12:00 PM. Tolerated prone well with no issues. To remain proned x 18 hours.  -  Nephrology consulted for ARTEMIO - ARTEMIO in the setting of septic shock 2/2 to COVID infection. Now likely in ATN  -  Rt IJV HD catheter inserted and started on CRRT  -  Lt IJV TLC inserted  -  Surgery consulted for evaluation of abdomen  - Blood cx with MSSA, Sputum cx with MSSA and P. mirablis. ID consulted for recs  --------  - patient assessed at bedside. Sedated and intubated, ROS could not be obtained  - per RN - no loose stools, no skin breakdown. Stable FiO2 requirements (55%), on CVVHD  - Rivera changed today by RN - purulent urine, with blood clots noted    primary team notes reviewed [x]  other consultant notes reviewed [x]    PAST MEDICAL & SURGICAL HISTORY:  Pneumomediastinum  Umbilical hernia  Reduciable  Osteoarthritis of both knees, unspecified osteoarthritis type  AARON (Obstructive Sleep Apnea)  category II pt uses c/pap pt to bering to hospital/  OR booking notified  Pneumonia   GERD (Gastroesophageal Reflux Disease)  Asthma - diagnosed   on adbvair denies attacks  DVT (Deep Venous Thrombosis)  left leg 6 m s/p knee replacement in   HTN (Hypertension)  H/O ileostomy  Ileostomy Revesal     S/P LEEP      S/P Exploratory Laparotomy    peritonitis  s/p right knee replacement/ colon resection and ileostomy    S/P Colonoscopy      S/P Endoscopy   gerd    S/P  Section      History of Tubal Ligation  s/p c/section     S/P Knee Surgery  2010      Allergies  Kiwi (Unknown)  latex (Anaphylaxis)  latex (Unknown)  penicillin (Other)  penicillin (Unknown)  perfume  hives (Other)  potassium acetate (Other)  soap additives hives (Other)    ANTIMICROBIALS (past 90 days)  MEDICATIONS  (STANDING):  ceFAZolin   IVPB   100 mL/Hr IV Intermittent (20 @ 13:12)    cefTRIAXone   IVPB   100 mL/Hr IV Intermittent (20 @ 10:40)    cefTRIAXone   IVPB   100 mL/Hr IV Intermittent (20 @ 20:50)   100 mL/Hr IV Intermittent (20 @ 21:00)    meropenem  IVPB   100 mL/Hr IV Intermittent (20 @ 17:04)   100 mL/Hr IV Intermittent (20 @ 05:04)   100 mL/Hr IV Intermittent (20 @ 15:19)    meropenem  IVPB   100 mL/Hr IV Intermittent (20 @ 05:02)    remdesivir  IVPB   500 mL/Hr IV Intermittent (20 @ 11:00)    remdesivir  IVPB   500 mL/Hr IV Intermittent (20 @ 17:48)   500 mL/Hr IV Intermittent (20 @ 18:33)   500 mL/Hr IV Intermittent (20 @ 17:31)   500 mL/Hr IV Intermittent (20 @ 19:00)    vancomycin  IVPB   250 mL/Hr IV Intermittent (20 @ 21:49)    vancomycin  IVPB   250 mL/Hr IV Intermittent (20 @ 21:08)   250 mL/Hr IV Intermittent (20 @ 10:19)      ANTIMICROBIALS:    ceFAZolin   IVPB 1000 every 8 hours  ceFAZolin   IVPB      OTHER MEDS: MEDICATIONS  (STANDING):  acetaminophen    Suspension .. 650 every 6 hours PRN  ALBUTerol    90 MICROgram(s) HFA Inhaler 4 every 6 hours  cisatracurium Infusion 3 <Continuous>  dextrose 40% Gel 15 once  dextrose 50% Injectable 25 once  dextrose 50% Injectable 12.5 once  dextrose 50% Injectable 25 once  glucagon  Injectable 1 once  heparin  Infusion 1900 <Continuous>  insulin lispro (ADMELOG) corrective regimen sliding scale  every 6 hours  insulin NPH human recombinant 10 every 6 hours  ipratropium 17 MICROgram(s) HFA Inhaler 2 every 6 hours  ketamine Infusion. 0.25 <Continuous>  metoclopramide Injectable 10 every 6 hours  midazolam Infusion 0.02 <Continuous>  midodrine 10 every 8 hours  norepinephrine Infusion 0.05 <Continuous>  pantoprazole  Injectable 40 daily  polyethylene glycol 3350 17 every 12 hours  propofol Infusion 50 <Continuous>    SOCIAL HISTORY:  Per notes - non-smoker    FAMILY HISTORY:  Family history of gastric cancer  Family history of breast cancer (Grandparent)      REVIEW OF SYSTEMS  [x] ROS unobtainable because: Sedated and intubated   [  ] All other systems negative except as noted below:	  Constitutional:  [ ] fever [ ] chills  [ ] weight loss  [ ] weakness  Skin:  [ ] rash [ ] phlebitis	  Eyes: [ ] icterus [ ] pain  [ ] discharge	  ENMT: [ ] sore throat  [ ] thrush [ ] ulcers [ ] exudates  Respiratory: [ ] dyspnea [ ] hemoptysis [ ] cough [ ] sputum	  Cardiovascular:  [ ] chest pain [ ] palpitations [ ] edema	  Gastrointestinal:  [ ] nausea [ ] vomiting [ ] diarrhea [ ] constipation [ ] pain	  Genitourinary:  [ ] dysuria [ ] frequency [ ] hematuria [ ] discharge [ ] flank pain  [ ] incontinence  Musculoskeletal:  [ ] myalgias [ ] arthralgias [ ] arthritis  [ ] back pain  Neurological:  [ ] headache [ ] seizures  [ ] confusion/altered mental status  Psychiatric:  [ ] anxiety [ ] depression	  Hematology/Lymphatics:  [ ] lymphadenopathy  Endocrine:  [ ] adrenal [ ] thyroid  Allergic/Immunologic:	 [ ] transplant [ ] seasonal    Vital Signs Last 24 Hrs  T(F): 99.9 (20 @ 12:00), Max: 109.4 (20 @ 22:15)  Vital Signs Last 24 Hrs  HR: 64 (20 @ 16:45) (64 - 102)  BP: --  RR: 30 (20 @ 14:45)  SpO2: 93% (20 @ 16:45) (92% - 100%)  Wt(kg): --    EXAM:  Constitutional: On AC/CMV FiO2 55%  Oral cavity: ET tube  Neck: No neck vein distension noted  RS: Chest clear to auscultation bilaterally. No wheeze/rhonchi/crepitations.  CVS: S1, S2 heard. Regular rate and rhythm. No murmurs/rubs/gallops.  Abdomen: Soft. Old surgical scar noted. No guarding/rigidity/tenderness.  : Rivera +  Extremities: Warm. No pedal edema  Skin: No lesions noted  Vascular: Rt IJV HD catheter and left IJV TLC. No evidence of phlebitis  Neuro: Sedated and intubated                          10.5   35.50 )-----------( 185      ( 2020 05:11 )             33.4     11-30    130<L>  |  96  |  26<H>  ----------------------------<  217<H>  4.5   |  21<L>  |  1.05    Ca    8.6      2020 13:39  Phos  3.8     11-  Mg     2.7         TPro  6.7  /  Alb  2.8<L>  /  TBili  1.7<H>  /  DBili  x   /  AST  24  /  ALT  23  /  AlkPhos  69      MICROBIOLOGY:Vancomycin Level, Trough: 21.5 ( @ 08:10)    Culture - Blood (collected 2020 18:19)  Source: .Blood Blood-Peripheral  Gram Stain (2020 15:25):    Growth in aerobic bottle: Gram Positive Cocci in Clusters    Growth in anaerobic bottle: Gram Positive Cocci in Clusters  Final Report (2020 16:37):    Growth in aerobic and anaerobic bottles: Staphylococcus aureus    See previous culture [id number]    Culture - Blood (collected 2020 18:19)  Source: .Blood Blood-Peripheral  Gram Stain (2020 13:32):    Growth in aerobic bottle: Gram Positive Cocci in Clusters    Growth in anaerobic bottle: Gram Positive Cocci in Clusters  Final Report (2020 10:15):    Growth in aerobic and anaerobic bottles: Staphylococcus aureus See    previous culture [id number]    Culture - Blood (collected 2020 05:21)  Source: .Blood Blood-Peripheral  Gram Stain (2020 22:18):    Growth in aerobic bottle: Gram Negative Rods    Growth in anaerobic bottle: Gram Positive Cocci in Clusters  Preliminary Report (2020 07:31):    Growth in anaerobic bottle: Gram Positive Cocci in Clusters    Growth in aerobic bottle: Proteus mirabilis    "Due to technical problems, Proteus sp. will Not be reported as part of    the BCID panel until further notice"    ***Blood Panel PCR results on this specimen are available    approximately 3 hours after the Gram stain result.***    Gram stain, PCR, and/or culture results may not always    correspond due to difference in methodologies.    ************************************************************    This PCR assay was performed using [employer].    The following targets are tested for: Enterococcus,    vancomycin resistant enterococci, Listeria monocytogenes,    coagulase negative staphylococci, S. aureus,    methicillin resistant S. aureus, Streptococcus agalactiae    (Group B), S. pneumoniae, S. pyogenes (Group A),    Acinetobacter baumannii, Enterobacter cloacae, E. coli,    Klebsiella oxytoca, K. pneumoniae, Proteus sp.,    Serratia marcescens, Haemophilus influenzae,    Neisseria meningitidis, Pseudomonas aeruginosa, Candida    albicans, C. glabrata, C krusei, C parapsilosis,    C. tropicalis and the KPC resistance gene.  Organism: Blood Culture PCR  Gram Negative Rods (2020 15:47)  Organism: Gram Negative Rods (2020 15:47)      -  Amikacin: S <=16      -  Ampicillin: S <=8 These ampicillin results predict results for amoxicillin      -  Ampicillin/Sulbactam: S <=4/2 Enterobacter, Citrobacter, and Serratia may develop resistance during prolonged therapy (3-4 days)      -  Aztreonam: S <=4      -  Cefazolin: S <=2 Enterobacter, Citrobacter, and Serratia may develop resistance during prolonged therapy (3-4 days)      -  Cefepime: S <=2      -  Cefoxitin: S <=8      -  Ceftriaxone: S <=1 Enterobacter, Citrobacter, and Serratia may develop resistance during prolonged therapy      -  Ciprofloxacin: S <=0.25      -  Ertapenem: S <=0.5      -  Gentamicin: S <=2      -  Levofloxacin: S <=0.5      -  Meropenem: S <=1      -  Piperacillin/Tazobactam: S <=8      -  Tobramycin: S <=2      -  Trimethoprim/Sulfamethoxazole: S <=0.5/9.5      Method Type: LONG  Organism: Blood Culture PCR (2020 12:36)      -  Acinetobacter baumanii: Nondet      -  Candida albicans: Nondet      -  Candida glabrata: Nondet      -  Candida krusei: Nondet      -  Candida parapsilosis: Nondet      -  Candida tropicalis: Nondet      -  Coagulase negative Staphylococcus: Nondet      -  Enterobacter cloacae complex: Nondet      -  Enterococcus species: Nondet      -  Escherichia coli: Nondet      -  Haemophilus influenzae: Nondet      -  Klebsiella oxytoca: Nondet      -  Klebsiella pneumoniae: Nondet      -  Listeria monocytogenes: Nondet      -  Methicillin resistant Staphylococcus aureus (MRSA): Nondet      -  Multidrug (KPC pos) resistant organism: Nondet      -  Neisseria meningitidis: Nondet      -  Pseudomonas aeruginosa: Nondet      -  Serratia marcescens: Nondet      -  Staphylococcus aureus: Nondet      -  Streptococcus agalactiae (Group B): Nondet      -  Streptococcus pneumoniae: Nondet      -  Streptococcus pyogenes (Group A): Nondet      -  Streptococcus sp. (Not Grp A, B or S pneumoniae): Nondet      -  Vancomycin resistant Enterococcus sp.: Nondet      Method Type: PCR    Culture - Blood (collected 2020 05:21)  Source: .Blood Blood-Peripheral  Gram Stain (2020 06:10):    Growth in aerobic bottle: Gram Positive Cocci in Clusters    Growth in anaerobic bottle: Gram Positive Cocci in Clusters  Final Report (2020 09:37):    Growth in aerobic and anaerobic bottles: Staphylococcus aureus    Growth in anaerobic bottle: Staphylococcus hominis    Coag Negative Staphylococcus    Single set isolate, possible contaminant. Contact    Microbiology if susceptibility testing clinically    indicated.    ***Blood Panel PCR results on this specimen are available    approximately 3 hours after the Gram stain result.***    Gram stain, PCR, and/or culture results may not always    correspond due to difference in methodologies.    ************************************************************    This PCR assay was performed using [employer].    The following targets are tested for: Enterococcus,    vancomycin resistant enterococci, Listeria monocytogenes,    coagulase negative staphylococci, S. aureus,    methicillin resistant S. aureus, Streptococcus agalactiae    (Group B), S. pneumoniae, S. pyogenes (Group A),    Acinetobacter baumannii, Enterobacter cloacae, E. coli,    Klebsiella oxytoca, K. pneumoniae, Proteus sp.,    Serratia marcescens, Haemophilus influenzae,    Neisseria meningitidis, Pseudomonas aeruginosa, Candida    albicans, C. glabrata, C krusei, C parapsilosis,    C. tropicalis and the KPC resistance gene.  Organism: Blood Culture PCR  Staphylococcus aureus (2020 09:37)  Organism: Staphylococcus aureus (2020 09:37)      -  Ampicillin/Sulbactam: S <=8/4      -  Cefazolin: S <=4      -  Clindamycin: R <=0.25 This isolate is presumed to be clindamycin resistant based on detection of inducible resistance. Clindamycin may still be effective in some patients.      -  Erythromycin: R >4      -  Gentamicin: S <=1 Should not be used as monotherapy      -  Oxacillin: S <=0.25      -  Penicillin: R 8      -  RIF- Rifampin: S <=1 Should not be used as monotherapy      -  Tetra/Doxy: S <=1      -  Trimethoprim/Sulfamethoxazole: S <=0.5/9.5      -  Vancomycin: S 1      Method Type: LONG  Organism: Blood Culture PCR (2020 09:37)      -  Staphylococcus aureus: Detec Any isolate of Staphylococcus aureus from a blood culture is NOT considered a contaminant.      Method Type: PCR          RADIOLOGY:  imaging below personally reviewed  < from: Xray Chest 1 View AP/PA (20 @ 13:08) >  The heart is enlarged. Diffuse space opacities seen throughout both lungs which remain unchanged when compared to previous study done 2020. Endotracheal tube is in good position. Changes compatible with pneumonia and or ARDS. NG tube is in the stomach however its tip is not seen on the current study. A central line is seen on the left and the tip is in the superior vena cava. No pneumothorax.  < end of copied text >    < from: CT Angio Chest w/ IV Cont (20 @ 00:49) >  IMPRESSION:  Suboptimal evaluation of the peripheral pulmonary arteries. No obvious central pulmonary embolus or secondary signs of right heart strain.  Commonly reported imaging features of COVID-19 pneumonia.  Additional findings as described.  < end of copied text >      OTHER TESTS:

## 2020-11-30 NOTE — CONSULT NOTE ADULT - ASSESSMENT
Assessment:   60F admitted with COVID pneumonia, course c/b acute hypoxic respiratory failure requiring intubation, sedation and paralysis for vent synchrony, CVVHD for worsening renal function, and vasopressors for hemodynamic support in setting of distributive shock. General surgery consulted for evaluation of abdomen.    Recommendations:  - No abdominal wounds identified, no signs of skin breakdown at prior surgical scars, no evidence of infection  - No surgical interventions warranted  - Please reconsult PRN    Red Surgery   [phone]

## 2020-11-30 NOTE — PROGRESS NOTE ADULT - SUBJECTIVE AND OBJECTIVE BOX
[facility] DIVISION OF KIDNEY DISEASES AND HYPERTENSION -- FOLLOW UP NOTE  --------------------------------------------------------------------------------  Shashank Murillo   Nephrology Fellow  Pager NS: [phone]/ [facility]: [phone]  (After 5 pm or on weekends please page the on-call fellow)      Patient is a 60y old  Female who presents with a chief complaint of Acute hypoxemic respiratory failure 2/2 covid19 (30 Nov 2020 12:04)      24 hour events/subjective: Patient seen and examined at the bedside. Vital signs, labs, medications reviewed. Pt remains on CRRT, tolerating well. On stable dose IV levophed for BP support. Improving Fio2: 55%, PEEP: 16        PAST HISTORY  --------------------------------------------------------------------------------  No significant changes to PMH, PSH, FHx, SHx, unless otherwise noted    ALLERGIES & MEDICATIONS  --------------------------------------------------------------------------------  Allergies    Kiwi (Unknown)  latex (Anaphylaxis)  latex (Unknown)  penicillin (Other)  penicillin (Unknown)  perfume  hives (Other)  potassium acetate (Other)  soap additives hives (Other)    Intolerances      Standing Inpatient Medications  ALBUTerol    90 MICROgram(s) HFA Inhaler 4 Puff(s) Inhalation every 6 hours  BACItracin   Ointment 1 Application(s) Topical two times a day  ceFAZolin   IVPB 1000 milliGRAM(s) IV Intermittent once  ceFAZolin   IVPB 1000 milliGRAM(s) IV Intermittent every 8 hours  ceFAZolin   IVPB      chlorhexidine 0.12% Liquid 15 milliLiter(s) Oral Mucosa every 12 hours  chlorhexidine 4% Liquid 1 Application(s) Topical <User Schedule>  chlorhexidine 4% Liquid 1 Application(s) Topical <User Schedule>  cisatracurium Infusion 3 MICROgram(s)/kG/Min IV Continuous <Continuous>  CRRT Treatment    <Continuous>  dextrose 40% Gel 15 Gram(s) Oral once  dextrose 5%. 1000 milliLiter(s) IV Continuous <Continuous>  dextrose 5%. 1000 milliLiter(s) IV Continuous <Continuous>  dextrose 50% Injectable 25 Gram(s) IV Push once  dextrose 50% Injectable 12.5 Gram(s) IV Push once  dextrose 50% Injectable 25 Gram(s) IV Push once  glucagon  Injectable 1 milliGRAM(s) IntraMuscular once  heparin  Infusion 1900 Unit(s)/Hr IV Continuous <Continuous>  insulin lispro (ADMELOG) corrective regimen sliding scale   SubCutaneous every 6 hours  insulin NPH human recombinant 10 Unit(s) SubCutaneous every 6 hours  ipratropium 17 MICROgram(s) HFA Inhaler 2 Puff(s) Inhalation every 6 hours  ketamine Infusion. 0.25 mG/kG/Hr IV Continuous <Continuous>  metoclopramide Injectable 10 milliGRAM(s) IV Push every 6 hours  midazolam Infusion 0.02 mG/kG/Hr IV Continuous <Continuous>  midodrine 10 milliGRAM(s) Oral every 8 hours  norepinephrine Infusion 0.05 MICROgram(s)/kG/Min IV Continuous <Continuous>  nystatin Powder 1 Application(s) Topical two times a day  pantoprazole  Injectable 40 milliGRAM(s) IV Push daily  petrolatum Ophthalmic Ointment 1 Application(s) Both EYES two times a day  Phoxillum Filtration BK 4 / 2.5 5000 milliLiter(s) CRRT <Continuous>  Phoxillum Filtration BK 4 / 2.5 5000 milliLiter(s) CRRT <Continuous>  Phoxillum Filtration BK 4 / 2.5 5000 milliLiter(s) CRRT <Continuous>  polyethylene glycol 3350 17 Gram(s) Oral every 12 hours  propofol Infusion 50 MICROgram(s)/kG/Min IV Continuous <Continuous>    PRN Inpatient Medications  acetaminophen    Suspension .. 650 milliGRAM(s) Oral every 6 hours PRN  sodium chloride 0.9% lock flush 10 milliLiter(s) IV Push every 1 hour PRN      REVIEW OF SYSTEMS  --------------------------------------------------------------------------------  Unable to assess 2/2 clinical status    >>> <<<    VITALS/PHYSICAL EXAM  --------------------------------------------------------------------------------  T(C): 37.7 (11-30-20 @ 12:00), Max: 37.7 (11-29-20 @ 22:45)  HR: 84 (11-30-20 @ 12:46) (80 - 102)  BP: --  RR: 30 (11-30-20 @ 12:45) (30 - 30)  SpO2: 95% (11-30-20 @ 12:46) (91% - 100%)  Wt(kg): --        11-29-20 @ 07:01  -  11-30-20 @ 07:00  --------------------------------------------------------  IN: 2854.1 mL / OUT: 3921 mL / NET: -1066.9 mL    11-30-20 @ 07:01  -  11-30-20 @ 13:12  --------------------------------------------------------  IN: 485.5 mL / OUT: 750 mL / NET: -264.5 mL      Physical Exam deferred 2/2 COVID19 and PPE preservation      LABS/STUDIES  --------------------------------------------------------------------------------              10.5   35.50 >-----------<  185      [11-30-20 @ 05:11]              33.4     133  |  99  |  24  ----------------------------<  179      [11-30-20 @ 05:11]  4.5   |  21  |  1.12        Ca     8.6     [11-30-20 @ 05:11]      Mg     2.7     [11-30-20 @ 05:11]      Phos  2.4     [11-30-20 @ 05:11]    TPro  6.7  /  Alb  2.8  /  TBili  1.7  /  DBili  x   /  AST  24  /  ALT  23  /  AlkPhos  69  [11-30-20 @ 05:11]    PT/INR: PT 11.4 , INR 0.95       [11-30-20 @ 05:11]  PTT: 64.1       [11-30-20 @ 05:11]          [11-30-20 @ 05:11]    Creatinine Trend:  SCr 1.12 [11-30 @ 05:11]  SCr 1.14 [11-29 @ 23:44]  SCr 1.08 [11-29 @ 17:49]  SCr 1.09 [11-29 @ 11:35]  SCr 1.19 [11-29 @ 05:32]    Urinalysis - [11-26-20 @ 23:57]      Color Yellow / Appearance Slightly Turbid / SG 1.021 / pH 5.5      Gluc Negative / Ketone Negative  / Bili Negative / Urobili Negative       Blood Negative / Protein 30 mg/dL / Leuk Est Moderate / Nitrite Negative      RBC 3 / WBC 8 / Hyaline 4 / Gran  / Sq Epi  / Non Sq Epi 2 / Bacteria Occasional    Urine Protein 40      [11-24-20 @ 13:46]  Urine Sodium <35      [11-26-20 @ 23:56]  Urine Urea Nitrogen 449      [11-24-20 @ 13:46]  Urine Potassium 21      [11-24-20 @ 10:18]  Urine Phosphorus 68.0      [11-24-20 @ 13:46]  Urine Osmolality 353      [11-26-20 @ 23:57]    Ferritin 1041      [11-30-20 @ 09:19]  HbA1c 6.6      [04-16-16 @ 06:00]  Lipid: chol --, , HDL --, LDL --      [11-30-20 @ 05:11]    HCV 0.10, Nonreact      [11-19-20 @ 09:32]

## 2020-11-30 NOTE — PROCEDURE NOTE - NSINDICATIONS_GEN_A_CORE
critical illness/emergency venous access/hemodynamic monitoring/venous access
arterial puncture to obtain ABG's/critical patient/monitoring purposes
critical illness
monitoring purposes/critical patient
dialysis/CRRT

## 2020-11-30 NOTE — PROGRESS NOTE ADULT - ASSESSMENT
ASSESSMENT:  61 y/o AARON, htn, DVT, PE, asthma? admitted to [facility] for cough, SOB, diarrhea found to have covid19, hospital course c/b Acute Hypoxic resp failure d/t to covid19 pna, intubated on 11/22/2020 and transfer to MICU. Patient with worsening covid PNA, new Staph PNA, (sputum with Staph), bacteremia, worsening PF ratio, attempted to prone 11/27 night with no success as patient did not tolerate, needed to be resuspined shortly after due to hypoxia. Renal fx continue to worsen, urine output low, with no significant improvement with diuresis. On 11/28 patient was started on CVVHD. All acces lines were also rotated.    PLAN:   Neurologic:   - Sedation with propofol, triglycerides increasing today  - Will start ketamine and aim to decrease propofol  - Continue versed, attempt to wean as tolerated  - Paralysis with Nimbex for vent desynchrony     Respiratory:   - Hypoxic respiratory failure 2/2 COVID PNA/ARDS  - Pressure control ventilation, pressure 22, PEEP 16, FiO2 70%  - Continue albuterol and Atrovent    Cardiovascular:   - Vasoplegia requiring norepinephrine and midodrine  - MAP goal >65    Gastrointestinal/Nutrition:   - High residuals with tube feeds >500ml despite feeds being held >4hrs  - Continue NGT to LCWS  - Protonix for prophylaxis  - Continue reglan and miralax    Renal/Genitourinary:   - Continue CRRT with goal net negative 100ml/hr  - Net negative 1.5L/24hrs    Hematologic:   - Continue heparin infusion, therapeutic    Infectious Disease:   - COVID s/p remdesivir and dexamethasone (last dose today)  - BCx: Proteus, staph aureus and staph hominus previously on Meropenem, now de-escalated to Cefazolin  - Sputum Cx: Staph aureus   - ID consult pending    Endocrine:   - DM2, HgbA1C 7.5  - Blood glucose levels in target range  - Continue NPH 10units q 6hrs  - Continue correctional scale insulin    Disposition: MICU

## 2020-11-30 NOTE — PROGRESS NOTE ADULT - ATTENDING COMMENTS
Intubated, on CRRT  1.  Respiratory failure, acute--volume optimizing with UF with consequent decreased FIO2 requirements  2.  ARF--CVVHDF given 3.  Clearances adequate and parameters reviewed with primary RN  3.  Hypotension--pressor support, trend lactic acid to avoid excess UF  4.  Hypophosphatemia--agree with switch to phoxillum

## 2020-11-30 NOTE — PROGRESS NOTE ADULT - SUBJECTIVE AND OBJECTIVE BOX
MICU Daily Progress Note  =====================================================  Interval/Overnight Events:     ***    POD #          	SICU Day #    ***    HPI:  ((insert from previous note)) ***    PMH:   ***    Allergies: Kiwi (Unknown)  latex (Anaphylaxis)  latex (Unknown)  penicillin (Other)  penicillin (Unknown)  perfume  hives (Other)  potassium acetate (Other)  soap additives hives (Other)      MEDICATIONS:   --------------------------------------------------------------------------------------  Neurologic Medications  acetaminophen    Suspension .. 650 milliGRAM(s) Oral every 6 hours PRN Temp greater or equal to 38C (100.4F)  cisatracurium Infusion 3 MICROgram(s)/kG/Min IV Continuous <Continuous>  metoclopramide Injectable 10 milliGRAM(s) IV Push every 6 hours  midazolam Infusion 0.02 mG/kG/Hr IV Continuous <Continuous>  propofol Infusion 50 MICROgram(s)/kG/Min IV Continuous <Continuous>    Respiratory Medications  ALBUTerol    90 MICROgram(s) HFA Inhaler 4 Puff(s) Inhalation every 6 hours  ipratropium 17 MICROgram(s) HFA Inhaler 2 Puff(s) Inhalation every 6 hours    Cardiovascular Medications  midodrine 10 milliGRAM(s) Oral every 8 hours  norepinephrine Infusion 0.05 MICROgram(s)/kG/Min IV Continuous <Continuous>    Gastrointestinal Medications  dextrose 5%. 1000 milliLiter(s) IV Continuous <Continuous>  dextrose 5%. 1000 milliLiter(s) IV Continuous <Continuous>  pantoprazole  Injectable 40 milliGRAM(s) IV Push daily  polyethylene glycol 3350 17 Gram(s) Oral every 12 hours  sodium chloride 0.9% lock flush 10 milliLiter(s) IV Push every 1 hour PRN Pre/post blood products, medications, blood draw, and to maintain line patency    Genitourinary Medications    Hematologic/Oncologic Medications  heparin  Infusion 1900 Unit(s)/Hr IV Continuous <Continuous>    Antimicrobial/Immunologic Medications  ceFAZolin   IVPB 1000 milliGRAM(s) IV Intermittent once  ceFAZolin   IVPB 1000 milliGRAM(s) IV Intermittent every 8 hours  ceFAZolin   IVPB        Endocrine/Metabolic Medications  dexAMETHasone  Injectable 6 milliGRAM(s) IV Push daily  dextrose 40% Gel 15 Gram(s) Oral once  dextrose 50% Injectable 25 Gram(s) IV Push once  dextrose 50% Injectable 12.5 Gram(s) IV Push once  dextrose 50% Injectable 25 Gram(s) IV Push once  glucagon  Injectable 1 milliGRAM(s) IntraMuscular once  insulin lispro (ADMELOG) corrective regimen sliding scale   SubCutaneous every 6 hours  insulin NPH human recombinant 10 Unit(s) SubCutaneous every 6 hours    Topical/Other Medications  chlorhexidine 0.12% Liquid 15 milliLiter(s) Oral Mucosa every 12 hours  chlorhexidine 4% Liquid 1 Application(s) Topical <User Schedule>  chlorhexidine 4% Liquid 1 Application(s) Topical <User Schedule>  CRRT Treatment    <Continuous>  petrolatum Ophthalmic Ointment 1 Application(s) Both EYES two times a day  PrismaSATE Dialysate BGK 4 / 2.5 5000 milliLiter(s) CRRT <Continuous>  PrismaSOL Filtration BGK 4 / 2.5 5000 milliLiter(s) CRRT <Continuous>  PrismaSOL Filtration BGK 4 / 2.5 5000 milliLiter(s) CRRT <Continuous>    --------------------------------------------------------------------------------------    VITAL SIGNS, INS/OUTS (last 24 hours):  --------------------------------------------------------------------------------------  ((Insert SICU Vitals/Is+Os here))***  --------------------------------------------------------------------------------------    EXAM  NEUROLOGY  RASS:   	GCS:    Exam: Normal, NAD, alert, oriented x3, no focal deficits. ***    HEENT  Exam: Normocephalic, atraumatic, EOMI.  ***    RESPIRATORY  Exam: Lungs clear to auscultation, Normal expansion/effort. ***  Mechanical Ventilation: Mode: AC/ CMV (Assist Control/ Continuous Mandatory Ventilation), RR (machine): 30, FiO2: 70, PEEP: 16, ITime: 1, MAP: 27, PIP: 39    CARDIOVASCULAR  Exam: S1, S2.  Regular rate and rhythm.   ***    GI/NUTRITION  Exam: Abdomen soft, Non-tender, Non-distended.  ***  Wound:  ***  Current Diet:  NPO***    VASCULAR  Exam: Extremities warm, pink, well-perfused. ***    MUSCULOSKELETAL  Exam: All extremities moving spontaneously without limitations. ***    SKIN  Exam: Good skin turgor, no skin breakdown. ***    METABOLIC/FLUIDS/ELECTROLYTES  dextrose 5%. 1000 milliLiter(s) IV Continuous <Continuous>  dextrose 5%. 1000 milliLiter(s) IV Continuous <Continuous>      HEMATOLOGIC  [x] VTE Prophylaxis: heparin  Infusion 1900 Unit(s)/Hr IV Continuous <Continuous>    Transfusions:	[] PRBC	[] Platelets		[] FFP	[] Cryoprecipitate    INFECTIOUS DISEASE  Antimicrobials/Immunologic Medications:  ceFAZolin   IVPB 1000 milliGRAM(s) IV Intermittent once  ceFAZolin   IVPB 1000 milliGRAM(s) IV Intermittent every 8 hours  ceFAZolin   IVPB        Day #      of     ***    Tubes/Lines/Drains  ***  [x] Peripheral IV  [] Central Venous Line     	[] R	[] L	[] IJ	[] Fem	[] SC	Date Placed:   [] Arterial Line		[] R	[] L	[] Fem	[] Rad	[] Ax	Date Placed:   [] PICC		[] Midline		[] Mediport  [] Urinary Catheter		Date Placed:   [x] Necessity of urinary, arterial, and venous catheters discussed    LABS  --------------------------------------------------------------------------------------  ((Insert SICU Labs here))***  --------------------------------------------------------------------------------------    OTHER LABORATORY:     IMAGING STUDIES:   CXR:     ASSESSMENT:  60y Female    ((insert from previous))***    PLAN:   ***  Neurologic:   Respiratory:   Cardiovascular:   Gastrointestinal/Nutrition:   Renal/Genitourinary:   Hematologic:   Infectious Disease:   Tubes/Lines/Drains:   Endocrine:   Disposition:     --------------------------------------------------------------------------------------    Critical Care Diagnoses: MICU Daily Progress Note  =====================================================  Interval/Overnight Events: Continues on CVVHD, negative 100ml/hr    HPI:  61 y/o AARON, htn, DVT, PE, asthma? admitted to [facility] for cough, SOB, diarrhea found to have covid19, hospital course c/b Acute Hypoxic resp failure d/t to covid19 pna, intubated on 11/22/2020 and transfer to MICU. Patient with worsening covid PNA, new Staph PNA, (sputum with Staph), bacteremia, worsening PF ratio, failed proning 11/27 night. Renal fx worsened, urine output low, with no significant improvement with diuresis. On 11/28 patient was started on CVVHD. All acces lines were also rotated.    Allergies: Kiwi (Unknown)  latex (Anaphylaxis)  latex (Unknown)  penicillin (Other)  penicillin (Unknown)  perfume  hives (Other)  potassium acetate (Other)  soap additives hives (Other)      MEDICATIONS:   --------------------------------------------------------------------------------------  Neurologic Medications  acetaminophen    Suspension .. 650 milliGRAM(s) Oral every 6 hours PRN Temp greater or equal to 38C (100.4F)  cisatracurium Infusion 3 MICROgram(s)/kG/Min IV Continuous <Continuous>  metoclopramide Injectable 10 milliGRAM(s) IV Push every 6 hours  midazolam Infusion 0.02 mG/kG/Hr IV Continuous <Continuous>  propofol Infusion 50 MICROgram(s)/kG/Min IV Continuous <Continuous>    Respiratory Medications  ALBUTerol    90 MICROgram(s) HFA Inhaler 4 Puff(s) Inhalation every 6 hours  ipratropium 17 MICROgram(s) HFA Inhaler 2 Puff(s) Inhalation every 6 hours    Cardiovascular Medications  midodrine 10 milliGRAM(s) Oral every 8 hours  norepinephrine Infusion 0.05 MICROgram(s)/kG/Min IV Continuous <Continuous>    Gastrointestinal Medications  dextrose 5%. 1000 milliLiter(s) IV Continuous <Continuous>  dextrose 5%. 1000 milliLiter(s) IV Continuous <Continuous>  pantoprazole  Injectable 40 milliGRAM(s) IV Push daily  polyethylene glycol 3350 17 Gram(s) Oral every 12 hours  sodium chloride 0.9% lock flush 10 milliLiter(s) IV Push every 1 hour PRN Pre/post blood products, medications, blood draw, and to maintain line patency    Genitourinary Medications    Hematologic/Oncologic Medications  heparin  Infusion 1900 Unit(s)/Hr IV Continuous <Continuous>    Antimicrobial/Immunologic Medications  ceFAZolin   IVPB 1000 milliGRAM(s) IV Intermittent once  ceFAZolin   IVPB 1000 milliGRAM(s) IV Intermittent every 8 hours  ceFAZolin   IVPB        Endocrine/Metabolic Medications  dexAMETHasone  Injectable 6 milliGRAM(s) IV Push daily  dextrose 40% Gel 15 Gram(s) Oral once  dextrose 50% Injectable 25 Gram(s) IV Push once  dextrose 50% Injectable 12.5 Gram(s) IV Push once  dextrose 50% Injectable 25 Gram(s) IV Push once  glucagon  Injectable 1 milliGRAM(s) IntraMuscular once  insulin lispro (ADMELOG) corrective regimen sliding scale   SubCutaneous every 6 hours  insulin NPH human recombinant 10 Unit(s) SubCutaneous every 6 hours    Topical/Other Medications  chlorhexidine 0.12% Liquid 15 milliLiter(s) Oral Mucosa every 12 hours  chlorhexidine 4% Liquid 1 Application(s) Topical <User Schedule>  chlorhexidine 4% Liquid 1 Application(s) Topical <User Schedule>  CRRT Treatment    <Continuous>  petrolatum Ophthalmic Ointment 1 Application(s) Both EYES two times a day  PrismaSATE Dialysate BGK 4 / 2.5 5000 milliLiter(s) CRRT <Continuous>  PrismaSOL Filtration BGK 4 / 2.5 5000 milliLiter(s) CRRT <Continuous>  PrismaSOL Filtration BGK 4 / 2.5 5000 milliLiter(s) CRRT <Continuous>    --------------------------------------------------------------------------------------    VITAL SIGNS, INS/OUTS (last 24 hours):  --------------------------------------------------------------------------------------  ((Insert SICU Vitals/Is+Os here))***  --------------------------------------------------------------------------------------    EXAM  NEUROLOGY  RASS:   	GCS:    Exam: Normal, NAD, alert, oriented x3, no focal deficits. ***    HEENT  Exam: Normocephalic, atraumatic, EOMI.  ***    RESPIRATORY  Exam: Lungs clear to auscultation, Normal expansion/effort. ***  Mechanical Ventilation: Mode: AC/ CMV (Assist Control/ Continuous Mandatory Ventilation), RR (machine): 30, FiO2: 70, PEEP: 16, ITime: 1, MAP: 27, PIP: 39    CARDIOVASCULAR  Exam: S1, S2.  Regular rate and rhythm.   ***    GI/NUTRITION  Exam: Abdomen soft, Non-tender, Non-distended.  ***  Wound:  ***  Current Diet:  NPO***    VASCULAR  Exam: Extremities warm, pink, well-perfused. ***    MUSCULOSKELETAL  Exam: All extremities moving spontaneously without limitations. ***    SKIN  Exam: Good skin turgor, no skin breakdown. ***    METABOLIC/FLUIDS/ELECTROLYTES  dextrose 5%. 1000 milliLiter(s) IV Continuous <Continuous>  dextrose 5%. 1000 milliLiter(s) IV Continuous <Continuous>      HEMATOLOGIC  [x] VTE Prophylaxis: heparin  Infusion 1900 Unit(s)/Hr IV Continuous <Continuous>    Transfusions:	[] PRBC	[] Platelets		[] FFP	[] Cryoprecipitate    INFECTIOUS DISEASE  Antimicrobials/Immunologic Medications:  ceFAZolin   IVPB 1000 milliGRAM(s) IV Intermittent once  ceFAZolin   IVPB 1000 milliGRAM(s) IV Intermittent every 8 hours  ceFAZolin   IVPB        Day #      of     ***    Tubes/Lines/Drains  ***  [x] Peripheral IV  [] Central Venous Line     	[] R	[] L	[] IJ	[] Fem	[] SC	Date Placed:   [] Arterial Line		[] R	[] L	[] Fem	[] Rad	[] Ax	Date Placed:   [] PICC		[] Midline		[] Mediport  [] Urinary Catheter		Date Placed:   [x] Necessity of urinary, arterial, and venous catheters discussed    LABS  --------------------------------------------------------------------------------------  ((Insert SICU Labs here))***  --------------------------------------------------------------------------------------    OTHER LABORATORY:     IMAGING STUDIES:   CXR:     ASSESSMENT:  60y Female    ((insert from previous))***    PLAN:   ***  Neurologic:   Respiratory:   Cardiovascular:   Gastrointestinal/Nutrition:   Renal/Genitourinary:   Hematologic:   Infectious Disease:   Tubes/Lines/Drains:   Endocrine:   Disposition:     --------------------------------------------------------------------------------------    Critical Care Diagnoses: MICU Daily Progress Note  =====================================================  Interval/Overnight Events: Continues on CVVHD, negative 100ml/hr    HPI:  59 y/o AARON, htn, DVT, PE, asthma? admitted to [facility] for cough, SOB, diarrhea found to have covid19, hospital course c/b Acute Hypoxic resp failure d/t to covid19 pna, intubated on 2020 and transfer to MICU. Patient with worsening covid PNA, new Staph PNA, (sputum with Staph), bacteremia, worsening PF ratio, failed proning  night. Renal fx worsened, urine output low, with no significant improvement with diuresis. On  patient was started on CVVHD. All acces lines were also rotated.    Allergies: Kiwi (Unknown)  latex (Anaphylaxis)  latex (Unknown)  penicillin (Other)  penicillin (Unknown)  perfume  hives (Other)  potassium acetate (Other)  soap additives hives (Other)      MEDICATIONS:   --------------------------------------------------------------------------------------  Neurologic Medications  acetaminophen    Suspension .. 650 milliGRAM(s) Oral every 6 hours PRN Temp greater or equal to 38C (100.4F)  cisatracurium Infusion 3 MICROgram(s)/kG/Min IV Continuous <Continuous>  metoclopramide Injectable 10 milliGRAM(s) IV Push every 6 hours  midazolam Infusion 0.02 mG/kG/Hr IV Continuous <Continuous>  propofol Infusion 50 MICROgram(s)/kG/Min IV Continuous <Continuous>    Respiratory Medications  ALBUTerol    90 MICROgram(s) HFA Inhaler 4 Puff(s) Inhalation every 6 hours  ipratropium 17 MICROgram(s) HFA Inhaler 2 Puff(s) Inhalation every 6 hours    Cardiovascular Medications  midodrine 10 milliGRAM(s) Oral every 8 hours  norepinephrine Infusion 0.05 MICROgram(s)/kG/Min IV Continuous <Continuous>    Gastrointestinal Medications  dextrose 5%. 1000 milliLiter(s) IV Continuous <Continuous>  dextrose 5%. 1000 milliLiter(s) IV Continuous <Continuous>  pantoprazole  Injectable 40 milliGRAM(s) IV Push daily  polyethylene glycol 3350 17 Gram(s) Oral every 12 hours  sodium chloride 0.9% lock flush 10 milliLiter(s) IV Push every 1 hour PRN Pre/post blood products, medications, blood draw, and to maintain line patency    Genitourinary Medications    Hematologic/Oncologic Medications  heparin  Infusion 1900 Unit(s)/Hr IV Continuous <Continuous>    Antimicrobial/Immunologic Medications  ceFAZolin   IVPB 1000 milliGRAM(s) IV Intermittent once  ceFAZolin   IVPB 1000 milliGRAM(s) IV Intermittent every 8 hours  ceFAZolin   IVPB        Endocrine/Metabolic Medications  dexAMETHasone  Injectable 6 milliGRAM(s) IV Push daily  dextrose 40% Gel 15 Gram(s) Oral once  dextrose 50% Injectable 25 Gram(s) IV Push once  dextrose 50% Injectable 12.5 Gram(s) IV Push once  dextrose 50% Injectable 25 Gram(s) IV Push once  glucagon  Injectable 1 milliGRAM(s) IntraMuscular once  insulin lispro (ADMELOG) corrective regimen sliding scale   SubCutaneous every 6 hours  insulin NPH human recombinant 10 Unit(s) SubCutaneous every 6 hours    Topical/Other Medications  chlorhexidine 0.12% Liquid 15 milliLiter(s) Oral Mucosa every 12 hours  chlorhexidine 4% Liquid 1 Application(s) Topical <User Schedule>  chlorhexidine 4% Liquid 1 Application(s) Topical <User Schedule>  CRRT Treatment    <Continuous>  petrolatum Ophthalmic Ointment 1 Application(s) Both EYES two times a day  PrismaSATE Dialysate BGK 4 / 2.5 5000 milliLiter(s) CRRT <Continuous>  PrismaSOL Filtration BGK 4 / 2.5 5000 milliLiter(s) CRRT <Continuous>  PrismaSOL Filtration BGK 4 / 2.5 5000 milliLiter(s) CRRT <Continuous>    --------------------------------------------------------------------------------------    VITAL SIGNS, INS/OUTS (last 24 hours):  --------------------------------------------------------------------------------------  T(C): 37.6 (20 @ 08:00), Max: 37.7 (20 @ 22:45)  HR: 93 (20 @ 11:45) (77 - 102)  BP: --  BP(mean): --  ABP: 140/55 (20 @ 11:45) (96/38 - 166/58)  ABP(mean): 69 (20 @ 11:45) (53 - 88)  RR: 30 (20 @ 12:15) (30 - 30)  SpO2: 95% (20 @ 08:30) (90% - 100%)  Wt(kg): --  CVP(mm Hg): --  CI: --  CAPILLARY BLOOD GLUCOSE      POCT Blood Glucose.: 207 mg/dL (2020 10:33)  POCT Blood Glucose.: 175 mg/dL (2020 04:54)  POCT Blood Glucose.: 178 mg/dL (2020 22:58)  POCT Blood Glucose.: 149 mg/dL (2020 18:55)  POCT Blood Glucose.: 147 mg/dL (2020 18:01)  POCT Blood Glucose.: 153 mg/dL (2020 17:02)  POCT Blood Glucose.: 157 mg/dL (2020 16:07)  POCT Blood Glucose.: 167 mg/dL (2020 15:06)  POCT Blood Glucose.: 162 mg/dL (2020 14:21)   N/A       @ 07:01  -   @ 07:00  --------------------------------------------------------  IN:    Cisatracurium: 391.2 mL    Enteral Tube Flush: 260 mL    Heparin: 500 mL    Insulin: 30 mL    IV PiggyBack: 100 mL    Midazolam: 326.4 mL    Nepro: 200 mL    Norepinephrine: 360.1 mL    Propofol: 686.4 mL  Total IN: 2854.1 mL    OUT:    Indwelling Catheter - Urethral (mL): 15 mL    Other (mL): 3906 mL  Total OUT: 3921 mL    Total NET: -1066.9 mL       @ 07:01  -   @ 12:23  --------------------------------------------------------  IN:    Cisatracurium: 81.5 mL    Heparin: 105 mL    Midazolam: 54.4 mL    Midazolam: 13.6 mL    Norepinephrine: 88 mL    Propofol: 143 mL  Total IN: 485.5 mL    OUT:    Indwelling Catheter - Urethral (mL): 0 mL    Other (mL): 750 mL  Total OUT: 750 mL    Total NET: -264.5 mL        --------------------------------------------------------------------------------------    EXAM  NEUROLOGY  Exam: Normal, intubated and sedated    HEENT  Exam: Normocephalic, atraumatic, pressure injury to chin    RESPIRATORY  Exam: Coarse breath sounds bilaterally, equal bilateral expansion  Mechanical Ventilation: Mode: AC/ CMV (Assist Control/ Continuous Mandatory Ventilation), RR (machine): 30, FiO2: 70, PEEP: 16, ITime: 1, MAP: 27, PIP: 39    CARDIOVASCULAR  Exam: S1, S2.  Regular rate and rhythm.    GI/NUTRITION  Exam: Abdomen soft, Non-tender, Non-distended. Obese  Current Diet:  NPO    VASCULAR  Exam: Extremities warm, pink, well-perfused.      METABOLIC/FLUIDS/ELECTROLYTES  dextrose 5%. 1000 milliLiter(s) IV Continuous <Continuous>  dextrose 5%. 1000 milliLiter(s) IV Continuous <Continuous>      HEMATOLOGIC  [x] VTE Prophylaxis: heparin  Infusion 1900 Unit(s)/Hr IV Continuous <Continuous>    Transfusions:	[] PRBC	[] Platelets		[] FFP	[] Cryoprecipitate    INFECTIOUS DISEASE  Antimicrobials/Immunologic Medications:  ceFAZolin   IVPB 1000 milliGRAM(s) IV Intermittent once  ceFAZolin   IVPB 1000 milliGRAM(s) IV Intermittent every 8 hours  ceFAZolin   IVPB        Day # 1    Tubes/Lines/Drains  [x] Peripheral IV  [x] Central Venous Line     	[] R	[] L	[] IJ	[] Fem	[] SC	Date Placed:   [x] Arterial Line		[] R	[] L	[] Fem	[] Rad	[] Ax	Date Placed:   [] PICC		[] Midline		[] Mediport  [x] Urinary Catheter		Date Placed:   [x] Necessity of urinary, arterial, and venous catheters discussed    LABS  --------------------------------------------------------------------------------------  CBC ( @ 05:11)                          10.5<L>                   35.50<H>  )--------------(  185        --    % Neuts, --    % Lymphs, ANC: --                              33.4<L>  CBC ( @ 23:44)                          10.3<L>                   32.42<H>  )--------------(  178        --    % Neuts, --    % Lymphs, ANC: --                              33.7<L>    BMP ( @ 05:11)       133<L>  |  99      |  24<H> 			Ca++ --      Ca 8.6          ---------------------------------( 179<H>		Mg 2.7<H>       4.5     |  21<L>   |  1.12  			Ph 2.4<L>  BMP ( @ 23:44)       134<L>  |  99      |  24<H> 			Ca++ --      Ca 8.4          ---------------------------------( 156<H>		Mg 2.6          4.7     |  21<L>   |  1.14  			Ph 2.6       LFTs ( @ 05:11)      TPro 6.7 / Alb 2.8<L> / TBili 1.7<H> / DBili -- / AST 24 / ALT 23 / AlkPhos 69  LFTs ( @ 23:44)      TPro 6.6 / Alb 2.7<L> / TBili 1.5<H> / DBili -- / AST 26 / ALT 26 / AlkPhos 77    Coags ( @ 05:11)  aPTT 64.1<H> / INR 0.95 / PT 11.4  Coags ( @ 23:44)  aPTT 65.1<H> / INR -- / PT --      ABG ( @ 05:11)      /  /  /  /  / %     Lactate:  2.0  ABG ( @ 05:06)     7.34<L> / 44 / 82 / 23 / -1.8 / 95%     Lactate:        Urinalysis ( @ 23:57):     Color: Yellow / Appearance: Slightly Turbid<!> / S.021 / pH: 5.5 / Gluc: Negative / Ketones: Negative / Bili: Negative / Urobili: Negative / Protein :30 mg/dL<!> / Nitrites: Negative / Leuk.Est: Moderate<!> / RBC: 3 / WBC: 8<H> / Sq Epi:  / Non Sq Epi: 2 / Bacteria Occasional<!>       -> .Blood Blood-Peripheral Culture ( @ 18:19)       Growth in aerobic bottle: Gram Positive Cocci in Clusters  Growth in anaerobic bottle: Gram Positive Cocci in Clusters    NG    Growth in aerobic and anaerobic bottles: Staphylococcus aureus See  previous culture [id number]    -> .Blood Blood-Peripheral Culture ( @ 05:21)       Growth in aerobic bottle: Gram Positive Cocci in Clusters  Growth in anaerobic bottle: Gram Positive Cocci in Clusters    Blood Culture PCR  Staphylococcus aureus    Growth in aerobic and anaerobic bottles: Staphylococcus aureus  Growth in anaerobic bottle: Staphylococcus hominis  Coag Negative Staphylococcus  Single set isolate, possible contaminant. Contact  Microbiology if susceptibility testing clinically  indicated.  ***Blood Panel PCR results on this specimen are available  approximately 3 hours after the Gram stain result.***  Gram stain, PCR, and/or culture results may not always  correspond due to difference in methodologies.  ************************************************************  This PCR assay was performed using [employer].  The following targets are tested for: Enterococcus,  vancomycin resistant enterococci, Listeria monocytogenes,  coagulase negative staphylococci, S. aureus,  methicillin resistant S. aureus, Streptococcus agalactiae  (Group B), S. pneumoniae, S. pyogenes (Group A),  Acinetobacter baumannii, Enterobacter cloacae, E. coli,  Klebsiella oxytoca, K. pneumoniae, Proteus sp.,  Serratia marcescens, Haemophilus influenzae,  Neisseria meningitidis, Pseudomonas aeruginosa, Candida  albicans, C. glabrata, C krusei, C parapsilosis,  C. tropicalis and the KPC resistance gene.    -> .Sputum Culture ( @ 00:27)       Rare polymorphonuclear leukocytes per low power field  No Squamous epithelial cells per low power field  Moderate Gram positive cocci in pairs seen per oil power field    Staphylococcus aureus    Numerous Staphylococcus aureus  Normal Respiratory Jocelynn absent    -> .Sputum Sputum Culture ( @ 14:28)       No polymorphonuclear leukocytes per low power field  No Squamous epithelial cells per low power field  Few Gram positive cocci in pairs per oil power field  Few Gram Positive Rods per oil power field    Staphylococcus aureus    Moderate Staphylococcus aureus  Normal Respiratory Jocelynn absent    --------------------------------------------------------------------------------------    OTHER LABORATORY:     IMAGING STUDIES:   CXR:     ASSESSMENT:  60y Female    ((insert from previous))***    PLAN:   ***  Neurologic:   Respiratory:   Cardiovascular:   Gastrointestinal/Nutrition:   Renal/Genitourinary:   Hematologic:   Infectious Disease:   Tubes/Lines/Drains:   Endocrine:   Disposition:     --------------------------------------------------------------------------------------    Critical Care Diagnoses: MICU Daily Progress Note  =====================================================  Interval/Overnight Events: Continues on CVVHD, negative 100ml/hr    HPI:  59 y/o AARON, htn, DVT, PE, asthma? admitted to [facility] for cough, SOB, diarrhea found to have covid19, hospital course c/b Acute Hypoxic resp failure d/t to covid19 pna, intubated on 2020 and transfer to MICU. Patient with worsening covid PNA, new Staph PNA, (sputum with Staph), bacteremia, worsening PF ratio, failed proning  night. Renal fx worsened, urine output low, with no significant improvement with diuresis. On  patient was started on CVVHD. All acces lines were also rotated.    Allergies: Kiwi (Unknown)  latex (Anaphylaxis)  latex (Unknown)  penicillin (Other)  penicillin (Unknown)  perfume  hives (Other)  potassium acetate (Other)  soap additives hives (Other)      MEDICATIONS:   --------------------------------------------------------------------------------------  Neurologic Medications  acetaminophen    Suspension .. 650 milliGRAM(s) Oral every 6 hours PRN Temp greater or equal to 38C (100.4F)  cisatracurium Infusion 3 MICROgram(s)/kG/Min IV Continuous <Continuous>  metoclopramide Injectable 10 milliGRAM(s) IV Push every 6 hours  midazolam Infusion 0.02 mG/kG/Hr IV Continuous <Continuous>  propofol Infusion 50 MICROgram(s)/kG/Min IV Continuous <Continuous>    Respiratory Medications  ALBUTerol    90 MICROgram(s) HFA Inhaler 4 Puff(s) Inhalation every 6 hours  ipratropium 17 MICROgram(s) HFA Inhaler 2 Puff(s) Inhalation every 6 hours    Cardiovascular Medications  midodrine 10 milliGRAM(s) Oral every 8 hours  norepinephrine Infusion 0.05 MICROgram(s)/kG/Min IV Continuous <Continuous>    Gastrointestinal Medications  dextrose 5%. 1000 milliLiter(s) IV Continuous <Continuous>  dextrose 5%. 1000 milliLiter(s) IV Continuous <Continuous>  pantoprazole  Injectable 40 milliGRAM(s) IV Push daily  polyethylene glycol 3350 17 Gram(s) Oral every 12 hours  sodium chloride 0.9% lock flush 10 milliLiter(s) IV Push every 1 hour PRN Pre/post blood products, medications, blood draw, and to maintain line patency    Genitourinary Medications    Hematologic/Oncologic Medications  heparin  Infusion 1900 Unit(s)/Hr IV Continuous <Continuous>    Antimicrobial/Immunologic Medications  ceFAZolin   IVPB 1000 milliGRAM(s) IV Intermittent once  ceFAZolin   IVPB 1000 milliGRAM(s) IV Intermittent every 8 hours  ceFAZolin   IVPB        Endocrine/Metabolic Medications  dexAMETHasone  Injectable 6 milliGRAM(s) IV Push daily  dextrose 40% Gel 15 Gram(s) Oral once  dextrose 50% Injectable 25 Gram(s) IV Push once  dextrose 50% Injectable 12.5 Gram(s) IV Push once  dextrose 50% Injectable 25 Gram(s) IV Push once  glucagon  Injectable 1 milliGRAM(s) IntraMuscular once  insulin lispro (ADMELOG) corrective regimen sliding scale   SubCutaneous every 6 hours  insulin NPH human recombinant 10 Unit(s) SubCutaneous every 6 hours    Topical/Other Medications  chlorhexidine 0.12% Liquid 15 milliLiter(s) Oral Mucosa every 12 hours  chlorhexidine 4% Liquid 1 Application(s) Topical <User Schedule>  chlorhexidine 4% Liquid 1 Application(s) Topical <User Schedule>  CRRT Treatment    <Continuous>  petrolatum Ophthalmic Ointment 1 Application(s) Both EYES two times a day  PrismaSATE Dialysate BGK 4 / 2.5 5000 milliLiter(s) CRRT <Continuous>  PrismaSOL Filtration BGK 4 / 2.5 5000 milliLiter(s) CRRT <Continuous>  PrismaSOL Filtration BGK 4 / 2.5 5000 milliLiter(s) CRRT <Continuous>    --------------------------------------------------------------------------------------    VITAL SIGNS, INS/OUTS (last 24 hours):  --------------------------------------------------------------------------------------  T(C): 37.6 (20 @ 08:00), Max: 37.7 (20 @ 22:45)  HR: 93 (20 @ 11:45) (77 - 102)  BP: --  BP(mean): --  ABP: 140/55 (20 @ 11:45) (96/38 - 166/58)  ABP(mean): 69 (20 @ 11:45) (53 - 88)  RR: 30 (20 @ 12:15) (30 - 30)  SpO2: 95% (20 @ 08:30) (90% - 100%)  Wt(kg): --  CVP(mm Hg): --  CI: --  CAPILLARY BLOOD GLUCOSE      POCT Blood Glucose.: 207 mg/dL (2020 10:33)  POCT Blood Glucose.: 175 mg/dL (2020 04:54)  POCT Blood Glucose.: 178 mg/dL (2020 22:58)  POCT Blood Glucose.: 149 mg/dL (2020 18:55)  POCT Blood Glucose.: 147 mg/dL (2020 18:01)  POCT Blood Glucose.: 153 mg/dL (2020 17:02)  POCT Blood Glucose.: 157 mg/dL (2020 16:07)  POCT Blood Glucose.: 167 mg/dL (2020 15:06)  POCT Blood Glucose.: 162 mg/dL (2020 14:21)   N/A       @ 07:01  -   @ 07:00  --------------------------------------------------------  IN:    Cisatracurium: 391.2 mL    Enteral Tube Flush: 260 mL    Heparin: 500 mL    Insulin: 30 mL    IV PiggyBack: 100 mL    Midazolam: 326.4 mL    Nepro: 200 mL    Norepinephrine: 360.1 mL    Propofol: 686.4 mL  Total IN: 2854.1 mL    OUT:    Indwelling Catheter - Urethral (mL): 15 mL    Other (mL): 3906 mL  Total OUT: 3921 mL    Total NET: -1066.9 mL       @ 07:01  -   @ 12:23  --------------------------------------------------------  IN:    Cisatracurium: 81.5 mL    Heparin: 105 mL    Midazolam: 54.4 mL    Midazolam: 13.6 mL    Norepinephrine: 88 mL    Propofol: 143 mL  Total IN: 485.5 mL    OUT:    Indwelling Catheter - Urethral (mL): 0 mL    Other (mL): 750 mL  Total OUT: 750 mL    Total NET: -264.5 mL        --------------------------------------------------------------------------------------    EXAM  NEUROLOGY  Exam: Normal, intubated and sedated    HEENT  Exam: Normocephalic, atraumatic, pressure injury to chin    RESPIRATORY  Exam: Coarse breath sounds bilaterally, equal bilateral expansion  Mechanical Ventilation: Mode: AC/ CMV (Assist Control/ Continuous Mandatory Ventilation), RR (machine): 30, FiO2: 70, PEEP: 16, ITime: 1, MAP: 27, PIP: 39    CARDIOVASCULAR  Exam: S1, S2.  Regular rate and rhythm.    GI/NUTRITION  Exam: Abdomen soft, Non-tender, Non-distended. Obese  Current Diet:  NPO    VASCULAR  Exam: Extremities warm, pink, well-perfused.      METABOLIC/FLUIDS/ELECTROLYTES  dextrose 5%. 1000 milliLiter(s) IV Continuous <Continuous>  dextrose 5%. 1000 milliLiter(s) IV Continuous <Continuous>      HEMATOLOGIC  [x] VTE Prophylaxis: heparin  Infusion 1900 Unit(s)/Hr IV Continuous <Continuous>    Transfusions:	[] PRBC	[] Platelets		[] FFP	[] Cryoprecipitate    INFECTIOUS DISEASE  Antimicrobials/Immunologic Medications:  ceFAZolin   IVPB 1000 milliGRAM(s) IV Intermittent once  ceFAZolin   IVPB 1000 milliGRAM(s) IV Intermittent every 8 hours  ceFAZolin   IVPB        Day # 1    Tubes/Lines/Drains  [x] Peripheral IV  [x] Central Venous Line     	[] R	[] L	[] IJ	[] Fem	[] SC	Date Placed:   [x] Arterial Line		[] R	[] L	[] Fem	[] Rad	[] Ax	Date Placed:   [] PICC		[] Midline		[] Mediport  [x] Urinary Catheter		Date Placed:   [x] Necessity of urinary, arterial, and venous catheters discussed    LABS  --------------------------------------------------------------------------------------  CBC ( @ 05:11)                          10.5<L>                   35.50<H>  )--------------(  185        --    % Neuts, --    % Lymphs, ANC: --                              33.4<L>  CBC ( @ 23:44)                          10.3<L>                   32.42<H>  )--------------(  178        --    % Neuts, --    % Lymphs, ANC: --                              33.7<L>    BMP ( @ 05:11)       133<L>  |  99      |  24<H> 			Ca++ --      Ca 8.6          ---------------------------------( 179<H>		Mg 2.7<H>       4.5     |  21<L>   |  1.12  			Ph 2.4<L>  BMP ( @ 23:44)       134<L>  |  99      |  24<H> 			Ca++ --      Ca 8.4          ---------------------------------( 156<H>		Mg 2.6          4.7     |  21<L>   |  1.14  			Ph 2.6       LFTs ( @ 05:11)      TPro 6.7 / Alb 2.8<L> / TBili 1.7<H> / DBili -- / AST 24 / ALT 23 / AlkPhos 69  LFTs ( @ 23:44)      TPro 6.6 / Alb 2.7<L> / TBili 1.5<H> / DBili -- / AST 26 / ALT 26 / AlkPhos 77    Coags ( @ 05:11)  aPTT 64.1<H> / INR 0.95 / PT 11.4  Coags ( @ 23:44)  aPTT 65.1<H> / INR -- / PT --      ABG ( @ 05:11)      /  /  /  /  / %     Lactate:  2.0  ABG ( @ 05:06)     7.34<L> / 44 / 82 / 23 / -1.8 / 95%     Lactate:        Urinalysis ( @ 23:57):     Color: Yellow / Appearance: Slightly Turbid<!> / S.021 / pH: 5.5 / Gluc: Negative / Ketones: Negative / Bili: Negative / Urobili: Negative / Protein :30 mg/dL<!> / Nitrites: Negative / Leuk.Est: Moderate<!> / RBC: 3 / WBC: 8<H> / Sq Epi:  / Non Sq Epi: 2 / Bacteria Occasional<!>       -> .Blood Blood-Peripheral Culture ( @ 18:19)       Growth in aerobic bottle: Gram Positive Cocci in Clusters  Growth in anaerobic bottle: Gram Positive Cocci in Clusters    NG    Growth in aerobic and anaerobic bottles: Staphylococcus aureus See  previous culture [id number]    -> .Blood Blood-Peripheral Culture ( @ 05:21)       Growth in aerobic bottle: Gram Positive Cocci in Clusters  Growth in anaerobic bottle: Gram Positive Cocci in Clusters    Blood Culture PCR  Staphylococcus aureus    Growth in aerobic and anaerobic bottles: Staphylococcus aureus  Growth in anaerobic bottle: Staphylococcus hominis  Coag Negative Staphylococcus  Single set isolate, possible contaminant. Contact  Microbiology if susceptibility testing clinically  indicated.  ***Blood Panel PCR results on this specimen are available  approximately 3 hours after the Gram stain result.***  Gram stain, PCR, and/or culture results may not always  correspond due to difference in methodologies.  ************************************************************  This PCR assay was performed using [employer].  The following targets are tested for: Enterococcus,  vancomycin resistant enterococci, Listeria monocytogenes,  coagulase negative staphylococci, S. aureus,  methicillin resistant S. aureus, Streptococcus agalactiae  (Group B), S. pneumoniae, S. pyogenes (Group A),  Acinetobacter baumannii, Enterobacter cloacae, E. coli,  Klebsiella oxytoca, K. pneumoniae, Proteus sp.,  Serratia marcescens, Haemophilus influenzae,  Neisseria meningitidis, Pseudomonas aeruginosa, Candida  albicans, C. glabrata, C krusei, C parapsilosis,  C. tropicalis and the KPC resistance gene.    -> .Sputum Culture ( @ 00:27)       Rare polymorphonuclear leukocytes per low power field  No Squamous epithelial cells per low power field  Moderate Gram positive cocci in pairs seen per oil power field    Staphylococcus aureus    Numerous Staphylococcus aureus  Normal Respiratory Jocelynn absent    -> .Sputum Sputum Culture ( @ 14:28)       No polymorphonuclear leukocytes per low power field  No Squamous epithelial cells per low power field  Few Gram positive cocci in pairs per oil power field  Few Gram Positive Rods per oil power field    Staphylococcus aureus    Moderate Staphylococcus aureus  Normal Respiratory Jcoelynn absent    --------------------------------------------------------------------------------------    OTHER LABORATORY:     IMAGING STUDIES:   CXR:

## 2020-12-01 LAB
-  AMPICILLIN/SULBACTAM: SIGNIFICANT CHANGE UP
-  CEFAZOLIN: SIGNIFICANT CHANGE UP
-  CLINDAMYCIN: SIGNIFICANT CHANGE UP
-  ERYTHROMYCIN: SIGNIFICANT CHANGE UP
-  GENTAMICIN: SIGNIFICANT CHANGE UP
-  OXACILLIN: SIGNIFICANT CHANGE UP
-  PENICILLIN: SIGNIFICANT CHANGE UP
-  RIFAMPIN: SIGNIFICANT CHANGE UP
-  TETRACYCLINE: SIGNIFICANT CHANGE UP
-  TRIMETHOPRIM/SULFAMETHOXAZOLE: SIGNIFICANT CHANGE UP
-  VANCOMYCIN: SIGNIFICANT CHANGE UP
ALBUMIN SERPL ELPH-MCNC: 2.5 G/DL — LOW (ref 3.3–5)
ALBUMIN SERPL ELPH-MCNC: 2.8 G/DL — LOW (ref 3.3–5)
ALBUMIN SERPL ELPH-MCNC: 2.8 G/DL — LOW (ref 3.3–5)
ALP SERPL-CCNC: 63 U/L — SIGNIFICANT CHANGE UP (ref 40–120)
ALP SERPL-CCNC: 63 U/L — SIGNIFICANT CHANGE UP (ref 40–120)
ALP SERPL-CCNC: 64 U/L — SIGNIFICANT CHANGE UP (ref 40–120)
ALT FLD-CCNC: 15 U/L — SIGNIFICANT CHANGE UP (ref 10–45)
ALT FLD-CCNC: 17 U/L — SIGNIFICANT CHANGE UP (ref 10–45)
ALT FLD-CCNC: 21 U/L — SIGNIFICANT CHANGE UP (ref 10–45)
ANION GAP SERPL CALC-SCNC: 13 MMOL/L — SIGNIFICANT CHANGE UP (ref 5–17)
ANION GAP SERPL CALC-SCNC: 14 MMOL/L — SIGNIFICANT CHANGE UP (ref 5–17)
APTT BLD: 57.2 SEC — HIGH (ref 27.5–35.5)
APTT BLD: 67 SEC — HIGH (ref 27.5–35.5)
APTT BLD: 71.1 SEC — HIGH (ref 27.5–35.5)
AST SERPL-CCNC: 21 U/L — SIGNIFICANT CHANGE UP (ref 10–40)
AST SERPL-CCNC: 23 U/L — SIGNIFICANT CHANGE UP (ref 10–40)
AST SERPL-CCNC: 24 U/L — SIGNIFICANT CHANGE UP (ref 10–40)
BILIRUB SERPL-MCNC: 1.3 MG/DL — HIGH (ref 0.2–1.2)
BILIRUB SERPL-MCNC: 1.6 MG/DL — HIGH (ref 0.2–1.2)
BILIRUB SERPL-MCNC: 1.8 MG/DL — HIGH (ref 0.2–1.2)
BUN SERPL-MCNC: 25 MG/DL — HIGH (ref 7–23)
BUN SERPL-MCNC: 25 MG/DL — HIGH (ref 7–23)
BUN SERPL-MCNC: 26 MG/DL — HIGH (ref 7–23)
BUN SERPL-MCNC: 27 MG/DL — HIGH (ref 7–23)
CALCIUM SERPL-MCNC: 8 MG/DL — LOW (ref 8.4–10.5)
CALCIUM SERPL-MCNC: 8.1 MG/DL — LOW (ref 8.4–10.5)
CALCIUM SERPL-MCNC: 8.3 MG/DL — LOW (ref 8.4–10.5)
CALCIUM SERPL-MCNC: 8.3 MG/DL — LOW (ref 8.4–10.5)
CHLORIDE SERPL-SCNC: 100 MMOL/L — SIGNIFICANT CHANGE UP (ref 96–108)
CHLORIDE SERPL-SCNC: 97 MMOL/L — SIGNIFICANT CHANGE UP (ref 96–108)
CHLORIDE SERPL-SCNC: 98 MMOL/L — SIGNIFICANT CHANGE UP (ref 96–108)
CHLORIDE SERPL-SCNC: 98 MMOL/L — SIGNIFICANT CHANGE UP (ref 96–108)
CO2 SERPL-SCNC: 19 MMOL/L — LOW (ref 22–31)
CO2 SERPL-SCNC: 19 MMOL/L — LOW (ref 22–31)
CO2 SERPL-SCNC: 21 MMOL/L — LOW (ref 22–31)
CO2 SERPL-SCNC: 21 MMOL/L — LOW (ref 22–31)
CREAT SERPL-MCNC: 1.07 MG/DL — SIGNIFICANT CHANGE UP (ref 0.5–1.3)
CREAT SERPL-MCNC: 1.14 MG/DL — SIGNIFICANT CHANGE UP (ref 0.5–1.3)
CREAT SERPL-MCNC: 1.18 MG/DL — SIGNIFICANT CHANGE UP (ref 0.5–1.3)
CREAT SERPL-MCNC: 1.22 MG/DL — SIGNIFICANT CHANGE UP (ref 0.5–1.3)
CULTURE RESULTS: SIGNIFICANT CHANGE UP
CULTURE RESULTS: SIGNIFICANT CHANGE UP
GAS PNL BLDA: SIGNIFICANT CHANGE UP
GLUCOSE BLDC GLUCOMTR-MCNC: 105 MG/DL — HIGH (ref 70–99)
GLUCOSE BLDC GLUCOMTR-MCNC: 131 MG/DL — HIGH (ref 70–99)
GLUCOSE BLDC GLUCOMTR-MCNC: 162 MG/DL — HIGH (ref 70–99)
GLUCOSE SERPL-MCNC: 116 MG/DL — HIGH (ref 70–99)
GLUCOSE SERPL-MCNC: 126 MG/DL — HIGH (ref 70–99)
GLUCOSE SERPL-MCNC: 137 MG/DL — HIGH (ref 70–99)
GLUCOSE SERPL-MCNC: 167 MG/DL — HIGH (ref 70–99)
GRAM STN FLD: SIGNIFICANT CHANGE UP
HCT VFR BLD CALC: 31.2 % — LOW (ref 34.5–45)
HCT VFR BLD CALC: 31.9 % — LOW (ref 34.5–45)
HCT VFR BLD CALC: 32.6 % — LOW (ref 34.5–45)
HGB BLD-MCNC: 10 G/DL — LOW (ref 11.5–15.5)
HGB BLD-MCNC: 10 G/DL — LOW (ref 11.5–15.5)
HGB BLD-MCNC: 9.6 G/DL — LOW (ref 11.5–15.5)
INR BLD: 0.91 RATIO — SIGNIFICANT CHANGE UP (ref 0.88–1.16)
MAGNESIUM SERPL-MCNC: 2.5 MG/DL — SIGNIFICANT CHANGE UP (ref 1.6–2.6)
MAGNESIUM SERPL-MCNC: 2.5 MG/DL — SIGNIFICANT CHANGE UP (ref 1.6–2.6)
MAGNESIUM SERPL-MCNC: 2.6 MG/DL — SIGNIFICANT CHANGE UP (ref 1.6–2.6)
MAGNESIUM SERPL-MCNC: 2.6 MG/DL — SIGNIFICANT CHANGE UP (ref 1.6–2.6)
MCHC RBC-ENTMCNC: 27.8 PG — SIGNIFICANT CHANGE UP (ref 27–34)
MCHC RBC-ENTMCNC: 27.9 PG — SIGNIFICANT CHANGE UP (ref 27–34)
MCHC RBC-ENTMCNC: 28.2 PG — SIGNIFICANT CHANGE UP (ref 27–34)
MCHC RBC-ENTMCNC: 30.7 GM/DL — LOW (ref 32–36)
MCHC RBC-ENTMCNC: 30.8 GM/DL — LOW (ref 32–36)
MCHC RBC-ENTMCNC: 31.3 GM/DL — LOW (ref 32–36)
MCV RBC AUTO: 89.9 FL — SIGNIFICANT CHANGE UP (ref 80–100)
MCV RBC AUTO: 90.6 FL — SIGNIFICANT CHANGE UP (ref 80–100)
MCV RBC AUTO: 90.7 FL — SIGNIFICANT CHANGE UP (ref 80–100)
METHOD TYPE: SIGNIFICANT CHANGE UP
NRBC # BLD: 0 /100 WBCS — SIGNIFICANT CHANGE UP (ref 0–0)
ORGANISM # SPEC MICROSCOPIC CNT: SIGNIFICANT CHANGE UP
PHOSPHATE SERPL-MCNC: 3.7 MG/DL — SIGNIFICANT CHANGE UP (ref 2.5–4.5)
PHOSPHATE SERPL-MCNC: 3.8 MG/DL — SIGNIFICANT CHANGE UP (ref 2.5–4.5)
PHOSPHATE SERPL-MCNC: 4.6 MG/DL — HIGH (ref 2.5–4.5)
PHOSPHATE SERPL-MCNC: 5.2 MG/DL — HIGH (ref 2.5–4.5)
PLATELET # BLD AUTO: 180 K/UL — SIGNIFICANT CHANGE UP (ref 150–400)
PLATELET # BLD AUTO: 201 K/UL — SIGNIFICANT CHANGE UP (ref 150–400)
PLATELET # BLD AUTO: 203 K/UL — SIGNIFICANT CHANGE UP (ref 150–400)
POTASSIUM SERPL-MCNC: 4.4 MMOL/L — SIGNIFICANT CHANGE UP (ref 3.5–5.3)
POTASSIUM SERPL-MCNC: 4.5 MMOL/L — SIGNIFICANT CHANGE UP (ref 3.5–5.3)
POTASSIUM SERPL-MCNC: 4.5 MMOL/L — SIGNIFICANT CHANGE UP (ref 3.5–5.3)
POTASSIUM SERPL-MCNC: 4.8 MMOL/L — SIGNIFICANT CHANGE UP (ref 3.5–5.3)
POTASSIUM SERPL-SCNC: 4.4 MMOL/L — SIGNIFICANT CHANGE UP (ref 3.5–5.3)
POTASSIUM SERPL-SCNC: 4.5 MMOL/L — SIGNIFICANT CHANGE UP (ref 3.5–5.3)
POTASSIUM SERPL-SCNC: 4.5 MMOL/L — SIGNIFICANT CHANGE UP (ref 3.5–5.3)
POTASSIUM SERPL-SCNC: 4.8 MMOL/L — SIGNIFICANT CHANGE UP (ref 3.5–5.3)
PROT SERPL-MCNC: 6.4 G/DL — SIGNIFICANT CHANGE UP (ref 6–8.3)
PROT SERPL-MCNC: 6.5 G/DL — SIGNIFICANT CHANGE UP (ref 6–8.3)
PROT SERPL-MCNC: 6.5 G/DL — SIGNIFICANT CHANGE UP (ref 6–8.3)
PROTHROM AB SERPL-ACNC: 11 SEC — SIGNIFICANT CHANGE UP (ref 10.6–13.6)
RBC # BLD: 3.44 M/UL — LOW (ref 3.8–5.2)
RBC # BLD: 3.55 M/UL — LOW (ref 3.8–5.2)
RBC # BLD: 3.6 M/UL — LOW (ref 3.8–5.2)
RBC # FLD: 16.9 % — HIGH (ref 10.3–14.5)
RBC # FLD: 17 % — HIGH (ref 10.3–14.5)
RBC # FLD: 17.1 % — HIGH (ref 10.3–14.5)
SARS-COV-2 RNA SPEC QL NAA+PROBE: DETECTED
SODIUM SERPL-SCNC: 129 MMOL/L — LOW (ref 135–145)
SODIUM SERPL-SCNC: 132 MMOL/L — LOW (ref 135–145)
SODIUM SERPL-SCNC: 132 MMOL/L — LOW (ref 135–145)
SODIUM SERPL-SCNC: 133 MMOL/L — LOW (ref 135–145)
SPECIMEN SOURCE: SIGNIFICANT CHANGE UP
WBC # BLD: 37.52 K/UL — HIGH (ref 3.8–10.5)
WBC # BLD: 40.37 K/UL — CRITICAL HIGH (ref 3.8–10.5)
WBC # BLD: 42.39 K/UL — CRITICAL HIGH (ref 3.8–10.5)
WBC # FLD AUTO: 37.52 K/UL — HIGH (ref 3.8–10.5)
WBC # FLD AUTO: 40.37 K/UL — CRITICAL HIGH (ref 3.8–10.5)
WBC # FLD AUTO: 42.39 K/UL — CRITICAL HIGH (ref 3.8–10.5)

## 2020-12-01 PROCEDURE — 93010 ELECTROCARDIOGRAM REPORT: CPT | Mod: 76

## 2020-12-01 PROCEDURE — 99233 SBSQ HOSP IP/OBS HIGH 50: CPT

## 2020-12-01 PROCEDURE — 99233 SBSQ HOSP IP/OBS HIGH 50: CPT | Mod: GC

## 2020-12-01 PROCEDURE — 99291 CRITICAL CARE FIRST HOUR: CPT

## 2020-12-01 RX ORDER — CASPOFUNGIN ACETATE 7 MG/ML
70 INJECTION, POWDER, LYOPHILIZED, FOR SOLUTION INTRAVENOUS ONCE
Refills: 0 | Status: COMPLETED | OUTPATIENT
Start: 2020-12-01 | End: 2020-12-01

## 2020-12-01 RX ORDER — CASPOFUNGIN ACETATE 7 MG/ML
INJECTION, POWDER, LYOPHILIZED, FOR SOLUTION INTRAVENOUS
Refills: 0 | Status: DISCONTINUED | OUTPATIENT
Start: 2020-12-01 | End: 2020-12-04

## 2020-12-01 RX ORDER — CASPOFUNGIN ACETATE 7 MG/ML
50 INJECTION, POWDER, LYOPHILIZED, FOR SOLUTION INTRAVENOUS EVERY 24 HOURS
Refills: 0 | Status: DISCONTINUED | OUTPATIENT
Start: 2020-12-02 | End: 2020-12-04

## 2020-12-01 RX ADMIN — CHLORHEXIDINE GLUCONATE 1 APPLICATION(S): 213 SOLUTION TOPICAL at 05:53

## 2020-12-01 RX ADMIN — NYSTATIN CREAM 1 APPLICATION(S): 100000 CREAM TOPICAL at 05:52

## 2020-12-01 RX ADMIN — MIDAZOLAM HYDROCHLORIDE 10.9 MG/KG/HR: 1 INJECTION, SOLUTION INTRAMUSCULAR; INTRAVENOUS at 18:21

## 2020-12-01 RX ADMIN — Medication 2 PUFF(S): at 11:22

## 2020-12-01 RX ADMIN — Medication 2 PUFF(S): at 00:07

## 2020-12-01 RX ADMIN — ALBUTEROL 4 PUFF(S): 90 AEROSOL, METERED ORAL at 05:07

## 2020-12-01 RX ADMIN — ALBUTEROL 4 PUFF(S): 90 AEROSOL, METERED ORAL at 11:22

## 2020-12-01 RX ADMIN — MIDAZOLAM HYDROCHLORIDE 2.72 MG/KG/HR: 1 INJECTION, SOLUTION INTRAMUSCULAR; INTRAVENOUS at 11:21

## 2020-12-01 RX ADMIN — HEPARIN SODIUM 22 UNIT(S)/HR: 5000 INJECTION INTRAVENOUS; SUBCUTANEOUS at 11:00

## 2020-12-01 RX ADMIN — Medication 2 PUFF(S): at 23:03

## 2020-12-01 RX ADMIN — HEPARIN SODIUM 22 UNIT(S)/HR: 5000 INJECTION INTRAVENOUS; SUBCUTANEOUS at 19:43

## 2020-12-01 RX ADMIN — ALBUTEROL 4 PUFF(S): 90 AEROSOL, METERED ORAL at 16:27

## 2020-12-01 RX ADMIN — Medication 1 APPLICATION(S): at 17:10

## 2020-12-01 RX ADMIN — Medication 1 APPLICATION(S): at 05:52

## 2020-12-01 RX ADMIN — MIDODRINE HYDROCHLORIDE 10 MILLIGRAM(S): 2.5 TABLET ORAL at 05:55

## 2020-12-01 RX ADMIN — KETAMINE HYDROCHLORIDE 3.4 MG/KG/HR: 100 INJECTION INTRAMUSCULAR; INTRAVENOUS at 19:44

## 2020-12-01 RX ADMIN — Medication 12.8 MICROGRAM(S)/KG/MIN: at 11:22

## 2020-12-01 RX ADMIN — Medication 1: at 17:09

## 2020-12-01 RX ADMIN — Medication 10 MILLIGRAM(S): at 11:18

## 2020-12-01 RX ADMIN — KETAMINE HYDROCHLORIDE 3.4 MG/KG/HR: 100 INJECTION INTRAMUSCULAR; INTRAVENOUS at 11:21

## 2020-12-01 RX ADMIN — CHLORHEXIDINE GLUCONATE 15 MILLILITER(S): 213 SOLUTION TOPICAL at 06:19

## 2020-12-01 RX ADMIN — NYSTATIN CREAM 1 APPLICATION(S): 100000 CREAM TOPICAL at 17:10

## 2020-12-01 RX ADMIN — Medication 1 APPLICATION(S): at 17:08

## 2020-12-01 RX ADMIN — HEPARIN SODIUM 22 UNIT(S)/HR: 5000 INJECTION INTRAVENOUS; SUBCUTANEOUS at 18:21

## 2020-12-01 RX ADMIN — Medication 10 MILLIGRAM(S): at 23:51

## 2020-12-01 RX ADMIN — Medication 2 PUFF(S): at 16:27

## 2020-12-01 RX ADMIN — POLYETHYLENE GLYCOL 3350 17 GRAM(S): 17 POWDER, FOR SOLUTION ORAL at 17:11

## 2020-12-01 RX ADMIN — ALBUTEROL 4 PUFF(S): 90 AEROSOL, METERED ORAL at 00:07

## 2020-12-01 RX ADMIN — MIDAZOLAM HYDROCHLORIDE 10.9 MG/KG/HR: 1 INJECTION, SOLUTION INTRAMUSCULAR; INTRAVENOUS at 19:43

## 2020-12-01 RX ADMIN — Medication 10 MILLIGRAM(S): at 05:55

## 2020-12-01 RX ADMIN — MIDODRINE HYDROCHLORIDE 10 MILLIGRAM(S): 2.5 TABLET ORAL at 13:06

## 2020-12-01 RX ADMIN — Medication 10 MILLIGRAM(S): at 17:10

## 2020-12-01 RX ADMIN — PANTOPRAZOLE SODIUM 40 MILLIGRAM(S): 20 TABLET, DELAYED RELEASE ORAL at 11:18

## 2020-12-01 RX ADMIN — Medication 100 MILLIGRAM(S): at 17:08

## 2020-12-01 RX ADMIN — CHLORHEXIDINE GLUCONATE 15 MILLILITER(S): 213 SOLUTION TOPICAL at 17:09

## 2020-12-01 RX ADMIN — Medication 100 MILLIGRAM(S): at 05:53

## 2020-12-01 RX ADMIN — MIDODRINE HYDROCHLORIDE 10 MILLIGRAM(S): 2.5 TABLET ORAL at 21:40

## 2020-12-01 RX ADMIN — HUMAN INSULIN 10 UNIT(S): 100 INJECTION, SUSPENSION SUBCUTANEOUS at 17:09

## 2020-12-01 RX ADMIN — ALBUTEROL 4 PUFF(S): 90 AEROSOL, METERED ORAL at 23:03

## 2020-12-01 RX ADMIN — Medication 12.8 MICROGRAM(S)/KG/MIN: at 19:43

## 2020-12-01 RX ADMIN — HUMAN INSULIN 10 UNIT(S): 100 INJECTION, SUSPENSION SUBCUTANEOUS at 11:11

## 2020-12-01 RX ADMIN — Medication 2 PUFF(S): at 05:07

## 2020-12-01 NOTE — PROGRESS NOTE ADULT - ASSESSMENT
Pt is a 61 y/o F w PMH of AARON, HTN, HLD, prior DVT/PE hx, asthma presented to [facility] for SOB and productive cough prior to admission. Admitted for acute hypoxic respiratory failure 2/2 to COVID infection. Was subsequently intubated and proned on 11/23//20. Was transferred to ICU for further management. Was started on pressors and nimbex drip. Nephrology consulted for ARTEMIO    #ARTEMIO  Pt with ARTEMIO in the setting of septic shock 2/2 to COVID infection. Now likely in ATN. Noted to have Scr of 0.53 on 11/23/20, then Scr further increased to 1.64 on 11/24/20, 2.18 on 11/25/20 and then further to 2.57 on 11/26/20 and 3.0 on 11/27. Patient was started on CRRT and patient now anuric. Adequate clearances noted on CRRT, tolerating well with UF. Phos high> change solution to prismasol. Monitor labs and urine output. Avoid NSAIDs, ACEI/ARBS, RCA and nephrotoxins. Dose medications as per CRRT

## 2020-12-01 NOTE — PROGRESS NOTE ADULT - SUBJECTIVE AND OBJECTIVE BOX
MIGUEL A AGUILA  MRN-[id number]  Patient is a 60y old  Female who presents with a chief complaint of Acute hypoxemic respiratory failure 2/2 covid19 (2020 16:48)    HPI:  60F w/pmhx AARON, HTN, 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 Patient reports that she also had a general feeling of "uneasiness" that she could not explain for the last week. No chest pain. No pleuritic pain. No leg swelling or pain. Denies myalgias. Denies sick contacts. Reports that her  and son live with her and they have tested so far negative for covid and are asymptomatic, patient is not sure where was exposed. Reports trying her symbicort this week for shortness of breath without much relief. Patient yesterday began to have fevers with highest fever at 104F which alarmed her and for that reason she came to the hospital for help.  (2020 05:46)      24 HOUR EVENTS:    REVIEW OF SYSTEMS:    CONSTITUTIONAL: No weakness, fevers or chills  EYES/ENT: No visual changes;  No vertigo or throat pain   NECK: No pain or stiffness  RESPIRATORY: No cough, wheezing, hemoptysis; No shortness of breath  CARDIOVASCULAR: No chest pain or palpitations  GASTROINTESTINAL: No abdominal or epigastric pain. No nausea, vomiting, or hematemesis; No diarrhea or constipation. No melena or hematochezia.  GENITOURINARY: No dysuria, frequency or hematuria  NEUROLOGICAL: No numbness or weakness  SKIN: No itching, rashes      ICU Vital Signs Last 24 Hrs  T(C): 37.2 (01 Dec 2020 04:00), Max: 37.7 (2020 12:00)  T(F): 99 (01 Dec 2020 04:00), Max: 99.9 (2020 12:00)  HR: 99 (01 Dec 2020 06:30) (64 - 102)  BP: --  BP(mean): --  ABP: 114/50 (01 Dec 2020 06:30) (107/44 - 208/70)  ABP(mean): 68 (01 Dec 2020 06:30) (56 - 108)  RR: 30 (01 Dec 2020 06:30) (30 - 31)  SpO2: 97% (01 Dec 2020 06:30) (91% - 98%)    Mode: AC/ CMV (Assist Control/ Continuous Mandatory Ventilation), RR (machine): 30, FiO2: 100, PEEP: 16, ITime: 1  CVP(mm Hg): --  CO: --  CI: --  PA: --  PA(mean): --  PA(direct): --  PCWP: --  LA: --  RA: --  SVR: --  SVRI: --  PVR: --  PVRI: --  I&O's Summary    2020 07:01  -  2020 07:00  --------------------------------------------------------  IN: 2854.1 mL / OUT: 3921 mL / NET: -1066.9 mL    2020 07:01  -  01 Dec 2020 06:53  --------------------------------------------------------  IN: 1657.8 mL / OUT: 3077 mL / NET: -1419.2 mL        CAPILLARY BLOOD GLUCOSE    CAPILLARY BLOOD GLUCOSE      POCT Blood Glucose.: 105 mg/dL (01 Dec 2020 05:09)      PHYSICAL EXAM:  GENERAL: No acute distress, well-developed  HEAD:  Atraumatic, Normocephalic  EYES: EOMI, PERRLA, conjunctiva and sclera clear  NECK: Supple, no lymphadenopathy, no JVD  CHEST/LUNG: CTAB; No wheezes, rales, or rhonchi  HEART: Regular rate and rhythm. Normal S1/S2. No murmurs, rubs, or gallops  ABDOMEN: Soft, non-tender, non-distended; normal bowel sounds, no organomegaly  EXTREMITIES:  2+ peripheral pulses b/l, No clubbing, cyanosis, or edema  NEUROLOGY: A&O x 3, no focal deficits  SKIN: No rashes or lesions    ============================I/O===========================   I&O's Detail    2020 07:  -  2020 07:00  --------------------------------------------------------  IN:    Cisatracurium: 391.2 mL    Enteral Tube Flush: 260 mL    Heparin: 500 mL    Insulin: 30 mL    IV PiggyBack: 100 mL    Midazolam: 326.4 mL    Nepro: 200 mL    Norepinephrine: 360.1 mL    Propofol: 686.4 mL  Total IN: 2854.1 mL    OUT:    Indwelling Catheter - Urethral (mL): 15 mL    Other (mL): 3906 mL  Total OUT: 3921 mL    Total NET: -1066.9 mL      2020 07:  -  01 Dec 2020 06:53  --------------------------------------------------------  IN:    Cisatracurium: 211.9 mL    Heparin: 487 mL    IV PiggyBack: 150 mL    Ketamine: 95.2 mL    Midazolam: 341.5 mL    Midazolam: 13.6 mL    Norepinephrine: 215.6 mL    Propofol: 143 mL  Total IN: 1657.8 mL    OUT:    Indwelling Catheter - Urethral (mL): 260 mL    Nasogastric/Oral tube (mL): 200 mL    Nepro: 0 mL    Other (mL): 2617 mL  Total OUT: 3077 mL    Total NET: -1419.2 mL        ============================ LABS =========================                        10.0   37.52 )-----------( 180      ( 01 Dec 2020 00:31 )             32.6         132<L>  |  100  |  26<H>  ----------------------------<  126<H>  4.5   |  19<L>  |  1.14    Ca    8.3<L>      01 Dec 2020 06:13  Phos  4.6       Mg     2.6         TPro  6.5  /  Alb  2.5<L>  /  TBili  1.3<H>  /  DBili  x   /  AST  24  /  ALT  21  /  AlkPhos  63                  LIVER FUNCTIONS - ( 01 Dec 2020 00:31 )  Alb: 2.5 g/dL / Pro: 6.5 g/dL / ALK PHOS: 63 U/L / ALT: 21 U/L / AST: 24 U/L / GGT: x           PT/INR - ( 01 Dec 2020 00:31 )   PT: 11.0 sec;   INR: 0.91 ratio         PTT - ( 01 Dec 2020 00:31 )  PTT:57.2 sec  ABG - ( 01 Dec 2020 00:24 )  pH, Arterial: 7.32  pH, Blood: x     /  pCO2: 46    /  pO2: 81    / HCO3: 24    / Base Excess: -2.0  /  SaO2: 95                Blood Gas Arterial, Lactate: 1.5 mmol/L (20 @ 00:24)  Blood Gas Arterial, Lactate: 2.2 mmol/L (20 @ 13:37)  Lactate, Blood: 2.0 mmol/L (20 @ 05:11)  Blood Gas Arterial, Lactate: 2.4 mmol/L (20 @ 05:06)  Blood Gas Arterial, Lactate: 2.2 mmol/L (20 @ 23:38)  Blood Gas Arterial, Lactate: 2.0 mmol/L (20 @ 19:28)  Blood Gas Arterial, Lactate: 1.7 mmol/L (20 @ 13:24)  Blood Gas Arterial, Lactate: 1.6 mmol/L (20 @ 11:29)  Blood Gas Arterial, Lactate: 1.4 mmol/L (20 @ 05:20)  Blood Gas Arterial, Lactate: 1.5 mmol/L (20 @ 23:20)  Blood Gas Arterial, Lactate: 1.7 mmol/L (20 @ 17:09)  Blood Gas Arterial, Lactate: 1.4 mmol/L (20 @ 10:15)      ======================Micro/Rad/Cardio=================  Telemetry: Reviewed   EKG: Reviewed  CXR: Reviewed  Culture: Reviewed   Echo: Transthoracic Echocardiogram:   Patient name: MIGUEL A AGUILA  YOB: 1960   Age: 60 (F)   MR#: [id number]  Study Date: 2020  Location: [address] [place], [place] [id number]: Osiris Shine RDCS  Study quality: Technically difficult  Referring Physician: Melody Galicia MD  Blood Pressure: 139/54 mmHg  Height: 152 cm  Weight: 136 kg  BSA: 2.2 m2  ------------------------------------------------------------------------  PROCEDURE: Transthoracic echocardiogram with 2-D, M-Mode  and complete spectral and color flow Doppler.  INDICATION: Dyspnea, unspecified (R06.00)  ------------------------------------------------------------------------  Dimensions:    Normal Values:  LA:            2.0 - 4.0 cm  Ao:            2.0 - 3.8 cm  SEPTUM: 1.2    0.6 - 1.2 cm  PWT:    1.1    0.6 - 1.1 cm  LVIDd:  4.2    3.0 - 5.6 cm  LVIDs:  2.5    1.8 - 4.0 cm  Derived variables:  LVMI: 76 g/m2  RWT: 0.52  EF (Visual Estimate): 75 %  ------------------------------------------------------------------------  Observations:  Mitral Valve: Normal mitral valve.  Aortic Valve/Aorta: Normal appearing aoritc valve leaflets.  Normal aortic root.  Left Atrium: Normal left atrium.  Left Ventricle: Suboptimal endocardial resolution.  Normal left ventricular size. Mild concentric left  ventricular hypertrophy.  Normal left ventricular systolic function. Probably no  segmental wall motion abnormalities.  Right Heart: The right ventricle is not well visualized.  Hpwever, the normal ejection period (about  300 msec), as  measured via PW-Doppler through thepulmonic valve,  suggests normal RV systolic function.  Pericardium/Pleura: Epicardial fat pad noted.  Hemodynamic: No evidence of pulmoanry hypertension.  ------------------------------------------------------------------------  Conclusions:  Suboptimal endocardial resolution.  Normal left ventricular systolic function. Probably no  segmental wall motion abnormalities.  ------------------------------------------------------------------------  Confirmed on  2020 - 13:58:15 by Tommy Shay MD, FASE  ------------------------------------------------------------------------ (20 @ 09:57)    Cath:   ======================================================  PAST MEDICAL & SURGICAL HISTORY:  Pneumomediastinum    Umbilical hernia  Reduciable    Osteoarthritis of both knees, unspecified osteoarthritis type    AARON (Obstructive Sleep Apnea)  category II pt uses c/pap pt to bering to hospital/  OR booking notified    Pneumonia      GERD (Gastroesophageal Reflux Disease)    Asthma  diagnosed   on adbvair denies attacks    DVT (Deep Venous Thrombosis)  left leg 6 m s/p knee replacement in     HTN (Hypertension)    H/O ileostomy  Ileostomy Revesal     S/P LEEP      S/P Exploratory Laparotomy    peritonitis  s/p right knee replacement/ colon resection and ileostomy    S/P Colonoscopy      S/P Endoscopy   gerd    S/P  Section      History of Tubal Ligation  s/p c/section     S/P Knee Surgery  2010 MIGUEL A AGUILA  MRN-[id number]  Patient is a 60y old  Female who presents with a chief complaint of Acute hypoxemic respiratory failure 2/2 covid19 (2020 16:48)    HPI: 60F w/pmhx AARON, HTN, 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 Patient reports that she also had a general feeling of "uneasiness" that she could not explain for the last week. No chest pain. No pleuritic pain. No leg swelling or pain. Denies myalgias. Denies sick contacts. Reports that her  and son live with her and they have tested so far negative for covid and are asymptomatic, patient is not sure where was exposed. Reports trying her symbicort this week for shortness of breath without much relief. Patient yesterday began to have fevers with highest fever at 104F which alarmed her and for that reason she came to the hospital for help.  (2020 05:46)      REVIEW OF SYSTEMS:  - Unable to obtain, intubated/sedated     ICU Vital Signs Last 24 Hrs  T(C): 37.2 (01 Dec 2020 04:00), Max: 37.7 (2020 12:00)  T(F): 99 (01 Dec 2020 04:00), Max: 99.9 (2020 12:00)  HR: 99 (01 Dec 2020 06:30) (64 - 102)  ABP: 114/50 (01 Dec 2020 06:30) (107/44 - 208/70)  ABP(mean): 68 (01 Dec 2020 06:30) (56 - 108)  RR: 30 (01 Dec 2020 06:30) (30 - 31)  SpO2: 97% (01 Dec 2020 06:30) (91% - 98%)    Mode: AC/ CMV (Assist Control/ Continuous Mandatory Ventilation), RR (machine): 30, FiO2: 100, PEEP: 16, ITime: 1      I&O's Summary    2020 07:01  -  2020 07:00  --------------------------------------------------------  IN: 2854.1 mL / OUT: 3921 mL / NET: -1066.9 mL    2020 07:01  -  01 Dec 2020 06:53  --------------------------------------------------------  IN: 1657.8 mL / OUT: 3077 mL / NET: -1419.2 mL      CAPILLARY BLOOD GLUCOSE  POCT Blood Glucose.: 105 mg/dL (01 Dec 2020 05:09)    PHYSICAL EXAM:      ============================I/O===========================   I&O's Detail    2020 07:01  -  2020 07:00  --------------------------------------------------------  IN:    Cisatracurium: 391.2 mL    Enteral Tube Flush: 260 mL    Heparin: 500 mL    Insulin: 30 mL    IV PiggyBack: 100 mL    Midazolam: 326.4 mL    Nepro: 200 mL    Norepinephrine: 360.1 mL    Propofol: 686.4 mL  Total IN: 2854.1 mL    OUT:    Indwelling Catheter - Urethral (mL): 15 mL    Other (mL): 3906 mL  Total OUT: 3921 mL    Total NET: -1066.9 mL    2020 07:  -  01 Dec 2020 06:53  --------------------------------------------------------  IN:    Cisatracurium: 211.9 mL    Heparin: 487 mL    IV PiggyBack: 150 mL    Ketamine: 95.2 mL    Midazolam: 341.5 mL    Midazolam: 13.6 mL    Norepinephrine: 215.6 mL    Propofol: 143 mL  Total IN: 1657.8 mL    OUT:    Indwelling Catheter - Urethral (mL): 260 mL    Nasogastric/Oral tube (mL): 200 mL    Nepro: 0 mL    Other (mL): 2617 mL  Total OUT: 3077 mL    Total NET: -1419.2 mL    ============================ LABS =========================                        10.0   37.52 )-----------( 180      ( 01 Dec 2020 00:31 )             32.6         132<L>  |  100  |  26<H>  ----------------------------<  126<H>  4.5   |  19<L>  |  1.14    Ca    8.3<L>      01 Dec 2020 06:13  Phos  4.6       Mg     2.6         TPro  6.5  /  Alb  2.5<L>  /  TBili  1.3<H>  /  DBili  x   /  AST  24  /  ALT  21  /  AlkPhos  63      LIVER FUNCTIONS - ( 01 Dec 2020 00:31 )  Alb: 2.5 g/dL / Pro: 6.5 g/dL / ALK PHOS: 63 U/L / ALT: 21 U/L / AST: 24 U/L / GGT: x           PT/INR - ( 01 Dec 2020 00:31 )   PT: 11.0 sec;   INR: 0.91 ratio    PTT - ( 01 Dec 2020 00:31 )  PTT:57.2 sec  ABG - ( 01 Dec 2020 00:24 )  pH, Arterial: 7.32  pH, Blood: x     /  pCO2: 46    /  pO2: 81    / HCO3: 24    / Base Excess: -2.0  /  SaO2: 95        Blood Gas Arterial, Lactate: 1.5 mmol/L (20 @ 00:24)  Blood Gas Arterial, Lactate: 2.2 mmol/L (20 @ 13:37)  Lactate, Blood: 2.0 mmol/L (20 @ 05:11)  Blood Gas Arterial, Lactate: 2.4 mmol/L (20 @ 05:06)  Blood Gas Arterial, Lactate: 2.2 mmol/L (20 @ 23:38)  Blood Gas Arterial, Lactate: 2.0 mmol/L (20 @ 19:28)  Blood Gas Arterial, Lactate: 1.7 mmol/L (20 @ 13:24)  Blood Gas Arterial, Lactate: 1.6 mmol/L (20 @ 11:29)  Blood Gas Arterial, Lactate: 1.4 mmol/L (20 @ 05:20)  Blood Gas Arterial, Lactate: 1.5 mmol/L (20 @ 23:20)  Blood Gas Arterial, Lactate: 1.7 mmol/L (20 @ 17:09)  Blood Gas Arterial, Lactate: 1.4 mmol/L (20 @ 10:15)  ======================Micro/Rad/Cardio=================  Telemetry: Reviewed   EKG: Reviewed  CXR: Reviewed  Culture: Reviewed   Echo: Transthoracic Echocardiogram:   Patient name: MIGUEL A AGUILA  YOB: 1960   Age: 60 (F)   MR#: [id number]  Study Date: 2020  Location: [address] [place], [place] [id number]: Osiris Shine RDCS  Study quality: Technically difficult  Referring Physician: Melody Galicia MD  Blood Pressure: 139/54 mmHg  Height: 152 cm  Weight: 136 kg  BSA: 2.2 m2  ------------------------------------------------------------------------  PROCEDURE: Transthoracic echocardiogram with 2-D, M-Mode  and complete spectral and color flow Doppler.  INDICATION: Dyspnea, unspecified (R06.00)  ------------------------------------------------------------------------  Dimensions:    Normal Values:  LA:            2.0 - 4.0 cm  Ao:            2.0 - 3.8 cm  SEPTUM: 1.2    0.6 - 1.2 cm  PWT:    1.1    0.6 - 1.1 cm  LVIDd:  4.2    3.0 - 5.6 cm  LVIDs:  2.5    1.8 - 4.0 cm  Derived variables:  LVMI: 76 g/m2  RWT: 0.52  EF (Visual Estimate): 75 %  ------------------------------------------------------------------------  Observations:  Mitral Valve: Normal mitral valve.  Aortic Valve/Aorta: Normal appearing aoritc valve leaflets.  Normal aortic root.  Left Atrium: Normal left atrium.  Left Ventricle: Suboptimal endocardial resolution.  Normal left ventricular size. Mild concentric left  ventricular hypertrophy.  Normal left ventricular systolic function. Probably no  segmental wall motion abnormalities.  Right Heart: The right ventricle is not well visualized.  Hpwever, the normal ejection period (about  300 msec), as  measured via PW-Doppler through thepulmonic valve,  suggests normal RV systolic function.  Pericardium/Pleura: Epicardial fat pad noted.  Hemodynamic: No evidence of pulmoanry hypertension.  ------------------------------------------------------------------------  Conclusions:  Suboptimal endocardial resolution.  Normal left ventricular systolic function. Probably no  segmental wall motion abnormalities.      PAST MEDICAL & SURGICAL HISTORY:  Pneumomediastinum    Umbilical hernia  Reduciable    Osteoarthritis of both knees, unspecified osteoarthritis type    AARON (Obstructive Sleep Apnea)  category II pt uses c/pap pt to bering to hospitals/  OR booking notified    Pneumonia      GERD (Gastroesophageal Reflux Disease)    Asthma  diagnosed   on adbvair denies attacks    DVT (Deep Venous Thrombosis)  left leg 6 m s/p knee replacement in     HTN (Hypertension)    H/O ileostomy  Ileostomy Revesal     S/P LEEP      S/P Exploratory Laparotomy    peritonitis  s/p right knee replacement/ colon resection and ileostomy    S/P Colonoscopy      S/P Endoscopy   gerd    S/P  Section      History of Tubal Ligation  s/p c/section     S/P Knee Surgery  2010 MIGUEL A AGUILA  MRN-[id number]  Patient is a 60y old  Female who presents with a chief complaint of Acute hypoxemic respiratory failure 2/2 covid19 (2020 16:48)    HPI: 60F w/pmhx AARON, HTN, 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 Patient reports that she also had a general feeling of "uneasiness" that she could not explain for the last week. No chest pain. No pleuritic pain. No leg swelling or pain. Denies myalgias. Denies sick contacts. Reports that her  and son live with her and they have tested so far negative for covid and are asymptomatic, patient is not sure where was exposed. Reports trying her symbicort this week for shortness of breath without much relief. Patient yesterday began to have fevers with highest fever at 104F which alarmed her and for that reason she came to the hospital for help.  (2020 05:46)      REVIEW OF SYSTEMS:  - Unable to obtain, intubated/sedated     ICU Vital Signs Last 24 Hrs  T(C): 37.2 (01 Dec 2020 04:00), Max: 37.7 (2020 12:00)  T(F): 99 (01 Dec 2020 04:00), Max: 99.9 (2020 12:00)  HR: 99 (01 Dec 2020 06:30) (64 - 102)  ABP: 114/50 (01 Dec 2020 06:30) (107/44 - 208/70)  ABP(mean): 68 (01 Dec 2020 06:30) (56 - 108)  RR: 30 (01 Dec 2020 06:30) (30 - 31)  SpO2: 97% (01 Dec 2020 06:30) (91% - 98%)    Mode: AC/ CMV (Assist Control/ Continuous Mandatory Ventilation), RR (machine): 30, FiO2: 100, PEEP: 16, ITime: 1      I&O's Summary    2020 07:01  -  2020 07:00  --------------------------------------------------------  IN: 2854.1 mL / OUT: 3921 mL / NET: -1066.9 mL    2020 07:  -  01 Dec 2020 06:53  --------------------------------------------------------  IN: 1657.8 mL / OUT: 3077 mL / NET: -1419.2 mL      CAPILLARY BLOOD GLUCOSE  POCT Blood Glucose.: 105 mg/dL (01 Dec 2020 05:09)    PHYSICAL EXAM:  General: Critically ill, intubated  Neurology: Paralyzed, sedated RASS -4  Respiratory: Diffuse rhonchi, no wheezing   CV: RRR, S1S2, no murmurs, rubs or gallops  Abdominal: Soft, NT, ND, normoactive BS  Extremities: Trace edema, + peripheral pulses  : Rivera intact/patent   ============================I/O===========================   I&O's Detail    2020 07:  -  2020 07:00  --------------------------------------------------------  IN:    Cisatracurium: 391.2 mL    Enteral Tube Flush: 260 mL    Heparin: 500 mL    Insulin: 30 mL    IV PiggyBack: 100 mL    Midazolam: 326.4 mL    Nepro: 200 mL    Norepinephrine: 360.1 mL    Propofol: 686.4 mL  Total IN: 2854.1 mL    OUT:    Indwelling Catheter - Urethral (mL): 15 mL    Other (mL): 3906 mL  Total OUT: 3921 mL    Total NET: -1066.9 mL    2020 07:01  -  01 Dec 2020 06:53  --------------------------------------------------------  IN:    Cisatracurium: 211.9 mL    Heparin: 487 mL    IV PiggyBack: 150 mL    Ketamine: 95.2 mL    Midazolam: 341.5 mL    Midazolam: 13.6 mL    Norepinephrine: 215.6 mL    Propofol: 143 mL  Total IN: 1657.8 mL    OUT:    Indwelling Catheter - Urethral (mL): 260 mL    Nasogastric/Oral tube (mL): 200 mL    Nepro: 0 mL    Other (mL): 2617 mL  Total OUT: 3077 mL    Total NET: -1419.2 mL    ============================ LABS =========================                        10.0   37.52 )-----------( 180      ( 01 Dec 2020 00:31 )             32.6     12    132<L>  |  100  |  26<H>  ----------------------------<  126<H>  4.5   |  19<L>  |  1.14    Ca    8.3<L>      01 Dec 2020 06:13  Phos  4.6       Mg     2.6         TPro  6.5  /  Alb  2.5<L>  /  TBili  1.3<H>  /  DBili  x   /  AST  24  /  ALT  21  /  AlkPhos  63  12    LIVER FUNCTIONS - ( 01 Dec 2020 00:31 )  Alb: 2.5 g/dL / Pro: 6.5 g/dL / ALK PHOS: 63 U/L / ALT: 21 U/L / AST: 24 U/L / GGT: x           PT/INR - ( 01 Dec 2020 00:31 )   PT: 11.0 sec;   INR: 0.91 ratio    PTT - ( 01 Dec 2020 00:31 )  PTT:57.2 sec  ABG - ( 01 Dec 2020 00:24 )  pH, Arterial: 7.32  pH, Blood: x     /  pCO2: 46    /  pO2: 81    / HCO3: 24    / Base Excess: -2.0  /  SaO2: 95        Blood Gas Arterial, Lactate: 1.5 mmol/L (20 @ 00:24)  Blood Gas Arterial, Lactate: 2.2 mmol/L (20 @ 13:37)  Lactate, Blood: 2.0 mmol/L (20 @ 05:11)  Blood Gas Arterial, Lactate: 2.4 mmol/L (20 @ 05:06)  Blood Gas Arterial, Lactate: 2.2 mmol/L (20 @ 23:38)  Blood Gas Arterial, Lactate: 2.0 mmol/L (20 @ 19:28)  Blood Gas Arterial, Lactate: 1.7 mmol/L (20 @ 13:24)  Blood Gas Arterial, Lactate: 1.6 mmol/L (20 @ 11:29)  Blood Gas Arterial, Lactate: 1.4 mmol/L (20 @ 05:20)  Blood Gas Arterial, Lactate: 1.5 mmol/L (20 @ 23:20)  Blood Gas Arterial, Lactate: 1.7 mmol/L (20 @ 17:09)  Blood Gas Arterial, Lactate: 1.4 mmol/L (20 @ 10:15)  ======================Micro/Rad/Cardio=================  Telemetry: Reviewed   EKG: Reviewed  CXR: Reviewed  Culture: Reviewed   Echo: Transthoracic Echocardiogram:   Patient name: MIGUEL A AGUILA  YOB: 1960   Age: 60 (F)   MR#: [id number]  Study Date: 2020  Location: [address] [place], [place] [id number]: Osiris Shine RDCS  Study quality: Technically difficult  Referring Physician: Melody Galicia MD  Blood Pressure: 139/54 mmHg  Height: 152 cm  Weight: 136 kg  BSA: 2.2 m2  ------------------------------------------------------------------------  PROCEDURE: Transthoracic echocardiogram with 2-D, M-Mode  and complete spectral and color flow Doppler.  INDICATION: Dyspnea, unspecified (R06.00)  ------------------------------------------------------------------------  Dimensions:    Normal Values:  LA:            2.0 - 4.0 cm  Ao:            2.0 - 3.8 cm  SEPTUM: 1.2    0.6 - 1.2 cm  PWT:    1.1    0.6 - 1.1 cm  LVIDd:  4.2    3.0 - 5.6 cm  LVIDs:  2.5    1.8 - 4.0 cm  Derived variables:  LVMI: 76 g/m2  RWT: 0.52  EF (Visual Estimate): 75 %  ------------------------------------------------------------------------  Observations:  Mitral Valve: Normal mitral valve.  Aortic Valve/Aorta: Normal appearing aoritc valve leaflets.  Normal aortic root.  Left Atrium: Normal left atrium.  Left Ventricle: Suboptimal endocardial resolution.  Normal left ventricular size. Mild concentric left  ventricular hypertrophy.  Normal left ventricular systolic function. Probably no  segmental wall motion abnormalities.  Right Heart: The right ventricle is not well visualized.  Hpwever, the normal ejection period (about  300 msec), as  measured via PW-Doppler through thepulmonic valve,  suggests normal RV systolic function.  Pericardium/Pleura: Epicardial fat pad noted.  Hemodynamic: No evidence of pulmoanry hypertension.  ------------------------------------------------------------------------  Conclusions:  Suboptimal endocardial resolution.  Normal left ventricular systolic function. Probably no  segmental wall motion abnormalities.      PAST MEDICAL & SURGICAL HISTORY:  Pneumomediastinum    Umbilical hernia  Reduciable    Osteoarthritis of both knees, unspecified osteoarthritis type    AARON (Obstructive Sleep Apnea)  category II pt uses c/pap pt to bering to hospital/  OR booking notified    Pneumonia      GERD (Gastroesophageal Reflux Disease)    Asthma  diagnosed   on adbvair denies attacks    DVT (Deep Venous Thrombosis)  left leg 6 m s/p knee replacement in     HTN (Hypertension)    H/O ileostomy  Ileostomy Revesal     S/P LEEP      S/P Exploratory Laparotomy    peritonitis  s/p right knee replacement/ colon resection and ileostomy    S/P Colonoscopy      S/P Endoscopy   gerd    S/P  Section      History of Tubal Ligation  s/p c/section     S/P Knee Surgery  2010

## 2020-12-01 NOTE — PROGRESS NOTE ADULT - ASSESSMENT
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 - used symbicort intermittently), AARON, peritonitis s/p Oral's procedure and ileostomy (reversed 2011) admitted with COVID19    Developed MSSA Bacteremia  Sputum culture with MSSA (likely PNA as initial source)  BCx also with Proteus (Cefazolins suceptible)    Would check U/A and UCx (although now s/p antibiotics) -  to evaluate for source for proteys  Would repeat TTE later this week    Now with rising leukocytosis which may be secondary to previous corticosteroids or secondary to uncontrolled/persistent MSSA infection   Will need line replacement as patient was actively bacteremia at time of currently line placement  If worsening WBC count / Clinical status would pursue CT C/A/P if able to exclude abscess / uncontrolled focus for bacteremia    RECOMMENDATIONS:    1. MSSA bacteremia, Leukocytosis   --Continue Cefazolin 2g IV Q12H while on CRRT  --Check TTE later this week  --Consider line change (especially if clinical instability or persistence in bacteremia)  --If worsening WBC count / Clinical status would pursue CT C/A/P if able to exclude abscess / uncontrolled focus for bacteremia  --Continue to follow CBC with diff  --Continue to follow renal function (Cr/BUN)  --Continue to follow temperature curve  --Follow up on preliminary blood cultures (repeat Q48H until cleared)    2. P. mirablis bacteremia  --Continue Cefazolin 2g IV q12h  --Check repeat UA and urine cx    3. COVID-19  - please hold further Dexamethasone (in setting of active bacteremia)  - Continue supplemental O2 and supportive care per primary team  - Continue Contact and Airborne Isolation precautions    4. Antibiotic allergy  - uncertain reaction - tolerated Ceftriaxone, Cefepime and Cefazolin in this admission  - outpatient  follow-up with Allergy/Immunology    I will continue to follow. Please feel free to contact me with any further questions.    Femi Adkins M.D.  [facility] Division of Infectious Disease  8AM-5PM: Pager Number [phone]  After Hours (or if no response): Please contact the Infectious Diseases Office at [phone]     The above assessment and plan were discussed with 5ICU NP

## 2020-12-01 NOTE — PROGRESS NOTE ADULT - ASSESSMENT
======================= DISPOSITION  =====================  [ ] Critical   [ ] Guarded    [ ] Stable    [ ] Maintain in CICU  [ ] Downgrade to Telemtry  [ ] Discharge Home   A/P: 60F Hx AARON, morbid obesity, HTN, DVT/PE p/w acute hypoxic respiratory failure in setting of ARDS/Covid-19 PNA s/p intubation.  Course c/b MSSA septic shock requiring pressors, ATN on CRRT.    Neuro  - Off paralytic, sedated on Ketamine 1.5 and Versed 0.15  - Would start adding PO sedatives, attempt to wean above   - Neuro checks per protocol     Pulm  #Hypoxic respiratory failure in setting of ARDS/Covid-19 PNA  - Intubated PC: PIP 38, RR 30, PEEP 16, Fi02 70% AB.32/46/81/24  - p/f: 115 peak: 39 plateau: 37, obtain CXR     CV  #Hypotension likely in setting of vasodilation   - Still requiring Levophed, maintain MAP >65, wean as tolerates     GI  - Tolerating trickle feds: Nephro, will advance to goal today   - Avoid checking residuals unless it clinically warrants   - c/w bowel regimen, GI ppx: PPI    Renal  #ATN requiring CRRT ()  - c/w CVV at -100ml/hr, strict I/Os, keep negative   - Renally dose meds, avoid nephrotoxins     Heme  #Hypercoagulable in setting of Covid-19, Hx DVT/PE  - Off NOAC, c/w systemic Heparin, PTT therapeutic  - Chest CTA (): no PE, +GGO b/l, check VA Duplex BLE    Endo  #T2DM, A1c 7.5  - -207, c/w NPH 10u q6h, ISS moderate     ID  #Covid-19 PNA  - S/P Remdesivir and Dexamethasone course   - Inflammatory markers q72h     #MSSA Bacteremia   - BCx MSSA/P. mirablis bacteremia, Sputum Cx: MSSA  - S/P Meropenem (-), Ceftriaxone (-)  - c/w Cefazolin 2mg IV QD - F/U BCx sent this AM  - Trend CBC w/ diff, lactate, procal, temp curve   - TTE later this week r/o endocarditis   - Shiley changed yesterday   - Remains off steriods   ======================= DISPOSITION  =====================  [X ] Critical   [ ] Guarded    [ ] Stable    [X ] Maintain in ICU  [ ] Downgrade to Medicine   [ ] Discharge Home      [facility]  ICU [phone]   A/P: 60F Hx AARON, morbid obesity, HTN, DVT/PE p/w acute hypoxic respiratory failure in setting of ARDS/Covid-19 PNA s/p intubation.  Course c/b MSSA septic shock requiring pressors, ATN on CRRT.    Neuro  - Off paralytic, sedated on Ketamine 1.5 and Versed 0.15  - Wean Versed, if agitated can increase Ketamine   - Neuro checks per protocol     Pulm  #Hypoxic respiratory failure in setting of ARDS/Covid-19 PNA  - Intubated PC: PIP 38, RR 30, PEEP 16, Fi02 70% AB.32/46/81/24 p/f: 115  - Wean Fio2 to 65%, vent management per ABGs, pulm toileting     CV  #Hypotension likely in setting of vasodilation   - Remains on Levophed, maintain MAP >65, wean as tolerates     GI  - Tolerating trickle TF: Nephro - will advance to goal today   - Avoid checking residuals unless it clinically warrants   - c/w bowel regimen, GI ppx: PPI    Renal  #ATN requiring CRRT ()  - c/w CVV at -100ml/hr, strict I/Os, keep negative   - Renally dose meds, avoid nephrotoxins     Heme  #Hypercoagulable in setting of Covid-19, Hx DVT/PE  - Off NOAC, c/w systemic Heparin, PTT therapeutic  - Chest CTA (): no PE, +GGO b/l, check VA Duplex BLE    Endo  #T2DM, A1c 7.5  - -207, c/w NPH 10u q6h, ISS moderate     ID  #Covid-19 PNA  - S/P Remdesivir and Dexamethasone course   - Inflammatory markers q72h     #MSSA Bacteremia   - BCx MSSA/P. mirablis bacteremia, Sputum Cx: MSSA  - S/P Meropenem (-), Ceftriaxone (-)  - c/w Cefazolin 2mg IV QD - F/U BCx sent this AM  - Trend CBC w/ diff, lactate, procal, temp curve   - TTE later this week r/o endocarditis   - Shiley changed yesterday   - Remains off steriods   ======================= DISPOSITION  =====================  [X ] Critical   [ ] Guarded    [ ] Stable    [X ] Maintain in ICU  [ ] Downgrade to Medicine   [ ] Discharge Home      [facility]  ICU [phone]

## 2020-12-01 NOTE — CHART NOTE - NSCHARTNOTEFT_GEN_A_CORE
Seen for nutrition follow up.     Source: medical record, communication with team. Unable to speak to pt due to current airborne isolation contact precautions related to COVID-19.     Chart reviewed, events noted. Per chart: 59 y/o AARON, htn, DVT, PE, asthma? admitted to [facility] for cough, SOB, diarrhea found to have covid19, hospital course c/b Acute Hypoxic resp failure d/t to covid19 pna, intubated on 11/22/2020 and transfer to MICU. Patient with worsening covid PNA, new Staph PNA, (sputum with Staph), bacteremia, worsening PF ratio, failed proning 11/27 night. Renal fx worsened, urine output low, with no significant improvement with diuresis. On 11/28 patient was started on CVVHD. All acces lines were also rotated. General surgery consulted for evaluation of abdomen, no abdominal wounds/infections or surgical intervention required.    Previous diet orders:   11/27: Nepro @ 15 ml/hr x 24 hours + Prosource 4x/day  11/30: Nepro @ 20 ml/hr x 24 hours + Prosource 4x/day   11/30: Nepro @ 10 ml/hr x 24 hours + Prosource 4x/day (current)    CURRENT EN ORDER:  - Nepro @ 10 ml/hr x 24 hours + No Carb Prosource 4x/day   - Provides: 240 ml formula, 432 kcal, 19 g protein, 175 ml free water  - Meets: 10kcal/kg, 0.4 g/kg protein using IBW 45.4 kg   **Does not meet nutritional requirements, see recommendations below    Nutrition Events:   - Most recent TG level 326 mg/dL (11-30); trending up, propofol d/c'd 11/30  - Vasoplegia requiring norepinephrine and midodrine  - Started on CRRT on 11/28; noted Na 132L, BUN 26H, Ca 8.3L, Phos 4.6H; K+ WNL  - Hx of DM, A1c 7.5%, glucose control with NPH 10units q6H, SSI  - Noted with increased residuals beginning on 11/29 despite holding feeds; receiving Reglan   - EN provision:       (12/01) feeds held for increased residuals, NGT to LWS      (11/30) 50 ml      (11/29) 150 ml       (11/28) 225 ml    Tube feed residuals per chart:      (12/01) none noted yet (but see below, ?location of documentation)      (11/30) 470ml IN, 350 ml OUT      (11/29) 800 ml       (11/28) 400 ml       (11/27) 65 ml    NGT output per chart:       (12/1) 200 ml thus far     GI per chart:   - Last BM: today, no diarrhea per ID note  - Bowel regimen: Miralax    Anthropometric Measurements:   Height (cm): 152.4 (11-17-20 @ 20:39)  Weight (kg): 136.1 (11-17-20 @ 20:39)  BMI (kg/m2): 58.6 (11-17-20 @ 20:39)    Medications: MEDICATIONS  (STANDING):  ALBUTerol    90 MICROgram(s) HFA Inhaler 4 Puff(s) Inhalation every 6 hours  BACItracin   Ointment 1 Application(s) Topical two times a day  ceFAZolin   IVPB 2000 milliGRAM(s) IV Intermittent every 12 hours  chlorhexidine 0.12% Liquid 15 milliLiter(s) Oral Mucosa every 12 hours  chlorhexidine 4% Liquid 1 Application(s) Topical <User Schedule>  chlorhexidine 4% Liquid 1 Application(s) Topical <User Schedule>  cisatracurium Infusion 3 MICROgram(s)/kG/Min (24.5 mL/Hr) IV Continuous <Continuous>  CRRT Treatment    <Continuous>  dextrose 40% Gel 15 Gram(s) Oral once  dextrose 5%. 1000 milliLiter(s) (50 mL/Hr) IV Continuous <Continuous>  dextrose 5%. 1000 milliLiter(s) (100 mL/Hr) IV Continuous <Continuous>  dextrose 50% Injectable 25 Gram(s) IV Push once  dextrose 50% Injectable 12.5 Gram(s) IV Push once  dextrose 50% Injectable 25 Gram(s) IV Push once  glucagon  Injectable 1 milliGRAM(s) IntraMuscular once  heparin  Infusion 1900 Unit(s)/Hr (22 mL/Hr) IV Continuous <Continuous>  insulin lispro (ADMELOG) corrective regimen sliding scale   SubCutaneous every 6 hours  insulin NPH human recombinant 10 Unit(s) SubCutaneous every 6 hours  ipratropium 17 MICROgram(s) HFA Inhaler 2 Puff(s) Inhalation every 6 hours  ketamine Infusion. 0.25 mG/kG/Hr (3.4 mL/Hr) IV Continuous <Continuous>  metoclopramide Injectable 10 milliGRAM(s) IV Push every 6 hours  midazolam Infusion 0.02 mG/kG/Hr (2.72 mL/Hr) IV Continuous <Continuous>  midodrine 10 milliGRAM(s) Oral every 8 hours  norepinephrine Infusion 0.05 MICROgram(s)/kG/Min (12.8 mL/Hr) IV Continuous <Continuous>  nystatin Powder 1 Application(s) Topical two times a day  pantoprazole  Injectable 40 milliGRAM(s) IV Push daily  petrolatum Ophthalmic Ointment 1 Application(s) Both EYES two times a day  polyethylene glycol 3350 17 Gram(s) Oral every 12 hours  PrismaSATE Dialysate BGK 4 / 2.5 5000 milliLiter(s) (3000 mL/Hr) CRRT <Continuous>  PrismaSOL Filtration BGK 0 / 2.5 5000 milliLiter(s) (200 mL/Hr) CRRT <Continuous>  PrismaSOL Filtration BGK 4 / 2.5 5000 milliLiter(s) (1000 mL/Hr) CRRT <Continuous>    MEDICATIONS  (PRN):  acetaminophen    Suspension .. 650 milliGRAM(s) Oral every 6 hours PRN Temp greater or equal to 38C (100.4F)  sodium chloride 0.9% lock flush 10 milliLiter(s) IV Push every 1 hour PRN Pre/post blood products, medications, blood draw, and to maintain line patency    Labs: 12-01 @ 06:13: Sodium 132<L>, Potassium 4.5, Calcium 8.3<L>, Magnesium 2.6, Phosphorus 4.6<H>, BUN 26<H>, Creatinine 1.14, Glucose 126<H>, Alk Phos --, ALT/SGPT --, AST/SGOT --, Albumin --, Prealbumin --, Total Bilirubin --, Hemoglobin --, Hematocrit --, Ferritin --, C-Reactive Protein --, Creatine Kinase <<27>  12-01 @ 00:31: Sodium 129<L>, Potassium 4.8, Calcium 8.0<L>, Magnesium 2.5, Phosphorus 5.2<H>, BUN 27<H>, Creatinine 1.07, Glucose 116<H>, Alk Phos 63, ALT/SGPT 21, AST/SGOT 24, Albumin 2.5<L>, Prealbumin --, Total Bilirubin 1.3<H>, Hemoglobin 10.0<L>, Hematocrit 32.6<L>, Ferritin --, C-Reactive Protein --, Creatine Kinase <<27>  11-30 @ 20:36: Sodium 131<L>, Potassium 4.6, Calcium 8.3<L>, Magnesium 2.6, Phosphorus 5.5<H>, BUN 28<H>, Creatinine 1.01, Glucose 148<H>, Alk Phos --, ALT/SGPT --, AST/SGOT --, Albumin --, Prealbumin --, Total Bilirubin --, Hemoglobin --, Hematocrit --, Ferritin --, C-Reactive Protein --, Creatine Kinase <<27>  11-30 @ 13:39: Sodium 130<L>, Potassium 4.5, Calcium 8.6, Magnesium 2.7<H>, Phosphorus 3.8, BUN 26<H>, Creatinine 1.05, Glucose 217<H>, Alk Phos --, ALT/SGPT --, AST/SGOT --, Albumin --, Prealbumin --, Total Bilirubin --, Hemoglobin --, Hematocrit --, Ferritin --, C-Reactive Protein --, Creatine Kinase <<27>      Triglycerides, Serum: 326 mg/dL (11-30-20 @ 05:11)  Triglycerides, Serum: 289 mg/dL (11-29-20 @ 11:35)  Triglycerides, Serum: 166 mg/dL (11-25-20 @ 00:46)    POCT Blood Glucose.: 105 mg/dL (12-01-20 @ 05:09)  POCT Blood Glucose.: 125 mg/dL (11-30-20 @ 22:17)  POCT Blood Glucose.: 188 mg/dL (11-30-20 @ 17:17)  POCT Blood Glucose.: 207 mg/dL (11-30-20 @ 10:33)    (11/24) A1c 7.5%, (11/19) A1c 7.0%    Skin per chart: chin scab, no pressure injuries documented   Edema per chart: 1+ dependent, bilateral feet/hands    Estimated Needs:   Using IBW 45.45kg in setting of BMI >50  Energy (22-25 kcal/kg): 9699-8350 kcal  Protein: (2.0-2.5 g/kg): 91-114g protein    (12/1): [place] State Equation (REE): The [place] State Equation (PSU) 2003b was used to calculate resting energy expenditure. RMR = 2226 kcal/day     Previous Nutrition Diagnoses:   1) Overweight/obesity - ongoing, addressed with hypocaloric, high protein tube feeds  2) Food and nutrition related knowledge deficit - intervention deferred at this time    New Nutrition Diagnoses:  #1: Inadequate protein-energy intake related to inability to meet estimated needs 2/2 complications associated with COVID-19 as evidenced by nutritional intake <50% of estimated needs x3 days  #2: Increased nutrient needs related to increased physiological demand as evidenced by CRRT    Recommended Interventions:   1) Continue Reglan as medically feasible/clinically indicated  2) Resume trickle feeds of Nepro as able, increasing as tolerated to eventual goal rate of 25 ml/hr x 24 hours + Prosource 3x/day. At goal, provides: 600 ml formula, 1260 kcal, 94 g protein, 436 ml free water (meeting 27kcal/kg, 2.0 g/kg protein using IBW 45.4 kg)     Continue monitoring and evaluating:  - Labs: blood glucose, electrolytes, renal indices  - GI function and bowel movements  - Nutritional intake, weight trends, skin integrity    RD remains available PRN via page/consult and will follow up per protocol.     Enedina Hines RD, CDN   Pager # [phone]

## 2020-12-01 NOTE — PROGRESS NOTE ADULT - ATTENDING COMMENTS
1. Acute hypoxemic respiratory with ARDS from SARS 2, Covid -19  pneumonia. Pt also with morbid obesity with BMI~58. Continue lung protective ventilation. Decrease FIO2 to 65. Increased last night for desaturation when turning patient. Continue PEEP 16. Stop decadron.  2..Id. S. Aureus ,S. Hominus, and Proteus bacteremia. Continue Cefazolin. WBC increasing. Send fungitel and fungal cx.  3. Renal failure from ATN. Continue CVVHD with fluid removal.  4. Hypotension from septic shock. Still dependent on norepinephrine.

## 2020-12-01 NOTE — PROGRESS NOTE ADULT - SUBJECTIVE AND OBJECTIVE BOX
Follow Up:  COVID19, MSSA Bacteremia, Proteus Bacteremia    Interval History:    REVIEW OF SYSTEMS  [  ] ROS unobtainable because:    [  ] All other systems negative except as noted below:     Constitutional:  [ ] fever [ ] chills  [ ] weight loss  [ ] weakness  Skin:  [ ] rash [ ] phlebitis	  Eyes: [ ] icterus [ ] pain  [ ] discharge	  ENMT: [ ] sore throat  [ ] thrush [ ] ulcers [ ] exudates  Respiratory: [  ] dyspnea [ ] hemoptysis [ ] cough [ ] sputum	  Cardiovascular:  [ ] chest pain [ ] palpitations [ ] edema	  Gastrointestinal:  [ ] nausea [ ] vomiting [ ] diarrhea [ ] constipation [ ] pain	  Genitourinary:  [ ] dysuria [ ] frequency [ ] hematuria [ ] discharge [ ] flank pain  [ ] incontinence  Musculoskeletal:  [ ] myalgias [ ] arthralgias [ ] arthritis  [ ] back pain  Neurological:  [ ] headache [ ] seizures  [ ] confusion/altered mental status    Allergies  Kiwi (Unknown)  latex (Anaphylaxis)  latex (Unknown)  penicillin (Other)  penicillin (Unknown)  perfume  hives (Other)  potassium acetate (Other)  soap additives hives (Other)        ANTIMICROBIALS:  ceFAZolin   IVPB 2000 every 12 hours      OTHER MEDS:  MEDICATIONS  (STANDING):  acetaminophen    Suspension .. 650 every 6 hours PRN  ALBUTerol    90 MICROgram(s) HFA Inhaler 4 every 6 hours  dextrose 40% Gel 15 once  dextrose 50% Injectable 12.5 once  dextrose 50% Injectable 25 once  dextrose 50% Injectable 25 once  glucagon  Injectable 1 once  heparin  Infusion 1900 <Continuous>  insulin lispro (ADMELOG) corrective regimen sliding scale  every 6 hours  insulin NPH human recombinant 10 every 6 hours  ipratropium 17 MICROgram(s) HFA Inhaler 2 every 6 hours  ketamine Infusion. 0.25 <Continuous>  metoclopramide Injectable 10 every 6 hours  midazolam Infusion 0.08 <Continuous>  midodrine 10 every 8 hours  norepinephrine Infusion 0.05 <Continuous>  pantoprazole  Injectable 40 daily  polyethylene glycol 3350 17 every 12 hours      Vital Signs Last 24 Hrs  T(C): 37.4 (01 Dec 2020 17:00), Max: 37.9 (01 Dec 2020 11:00)  T(F): 99.3 (01 Dec 2020 17:00), Max: 100.2 (01 Dec 2020 11:00)  HR: 98 (01 Dec 2020 17:00) (65 - 108)  BP: --  BP(mean): --  RR: 30 (01 Dec 2020 17:00) (30 - 31)  SpO2: 94% (01 Dec 2020 17:00) (89% - 98%)    PHYSICAL EXAMINATION:    General: Alert and Awake, NAD  HEENT: PERRL, EOMI  Neck: Supple  Cardiac: RRR, No M/R/G  Resp: CTAB, No Wh/Rh/Ra  Abdomen: NBS, NT/ND, No HSM, No rigidity or guarding  MSK: No LE edema. No Calf tenderness  : No negro  Skin: No rashes or lesions. Skin is warm and dry to the touch.   Neuro: Alert and Awake. CN 2-12 Grossly intact. Moves all four extremities spontaneously.  Psych: Calm, Pleasant, Cooperative    LABORATORY:                          9.6    42.39 )-----------( 201      ( 01 Dec 2020 16:30 )             31.2       12-01    133<L>  |  98  |  25<H>  ----------------------------<  167<H>  4.5   |  21<L>  |  1.18    Ca    8.1<L>      01 Dec 2020 16:30  Phos  3.7     12-01  Mg     2.5     12-01    TPro  6.4  /  Alb  2.8<L>  /  TBili  1.8<H>  /  DBili  x   /  AST  21  /  ALT  17  /  AlkPhos  63  12-01    C-Reactive Protein, Serum: 12.29 mg/dL (11-30-20 @ 05:11)  C-Reactive Protein, Serum: 30.15 mg/dL (11-28-20 @ 23:22)  C-Reactive Protein, Serum: 31.18 mg/dL (11-26-20 @ 23:52)    Ferritin, Serum: 1041 ng/mL (11-30-20 @ 09:19)  Ferritin, Serum: 956 ng/mL (11-29-20 @ 00:57)  Ferritin, Serum: 709 ng/mL (11-27-20 @ 06:15)    D-Dimer Assay, Quantitative: 1072 ng/mL DDU (11-30-20 @ 05:11)  D-Dimer Assay, Quantitative: 2256 ng/mL DDU (11-28-20 @ 23:22)  D-Dimer Assay, Quantitative: 2844 ng/mL DDU (11-26-20 @ 23:52)    MICROBIOLOGY:    .Sputum Sputum  12-01-20 --  --    Moderate Squamous epithelial cells per low power field  No polymorphonuclear leukocytes per low power field  Few Gram Variable Rods per oil power field  Rare Gram positive cocci in pairs per oil power field      .Blood Blood-Peripheral  11-28-20   Growth in aerobic and anaerobic bottles: Staphylococcus aureus See  previous culture [id number]  --    Growth in aerobic bottle: Gram Positive Cocci in Clusters  Growth in anaerobic bottle: Gram Positive Cocci in Clusters      .Blood Blood-Peripheral  11-27-20   Growth in aerobic and anaerobic bottles: Staphylococcus aureus  Growth in anaerobic bottle: Staphylococcus hominis  Coag Negative Staphylococcus  Single set isolate, possible contaminant. Contact  Microbiology if susceptibility testing clinically  indicated.  ***Blood Panel PCR results on this specimen are available  approximately 3 hours after the Gram stain result.***  Gram stain, PCR, and/or culture results may not always  correspond due to difference in methodologies.  ************************************************************  This PCR assay was performed using [employer].  The following targets are tested for: Enterococcus,  vancomycin resistant enterococci, Listeria monocytogenes,  coagulase negative staphylococci, S. aureus,  methicillin resistant S. aureus, Streptococcus agalactiae  (Group B), S. pneumoniae, S. pyogenes (Group A),  Acinetobacter baumannii, Enterobacter cloacae, E. coli,  Klebsiella oxytoca, K. pneumoniae, Proteus sp.,  Serratia marcescens, Haemophilus influenzae,  Neisseria meningitidis, Pseudomonas aeruginosa, Candida  albicans, C. glabrata, C krusei, C parapsilosis,  C. tropicalis and the KPC resistance gene.  --  Blood Culture PCR  Staphylococcus aureus      .Sputum  11-25-20   Numerous Staphylococcus aureus  Normal Respiratory Jocelynn absent  --  Staphylococcus aureus      .Sputum Sputum  11-24-20   Moderate Staphylococcus aureus  Normal Respiratory Jocelynn absent  --  Staphylococcus aureus      .Blood Blood-Peripheral  11-19-20   No Growth Final  --  --                RADIOLOGY:    <The imaging below has been reviewed and visualized by me independently. Findings as detailed in report below> Follow Up:  COVID19, MSSA Bacteremia, Proteus Bacteremia    Interval History: afebrile overnight. Per RN - minimal bloody secretions in ETT. No diarrhea. waxing and waning requirements on vasopressors.     REVIEW OF SYSTEMS  [ x ] ROS unobtainable because:  intubated and sedated  [  ] All other systems negative except as noted below:     Constitutional:  [ ] fever [ ] chills  [ ] weight loss  [ ] weakness  Skin:  [ ] rash [ ] phlebitis	  Eyes: [ ] icterus [ ] pain  [ ] discharge	  ENMT: [ ] sore throat  [ ] thrush [ ] ulcers [ ] exudates  Respiratory: [  ] dyspnea [ ] hemoptysis [ ] cough [ ] sputum	  Cardiovascular:  [ ] chest pain [ ] palpitations [ ] edema	  Gastrointestinal:  [ ] nausea [ ] vomiting [ ] diarrhea [ ] constipation [ ] pain	  Genitourinary:  [ ] dysuria [ ] frequency [ ] hematuria [ ] discharge [ ] flank pain  [ ] incontinence  Musculoskeletal:  [ ] myalgias [ ] arthralgias [ ] arthritis  [ ] back pain  Neurological:  [ ] headache [ ] seizures  [ ] confusion/altered mental status    Allergies  Kiwi (Unknown)  latex (Anaphylaxis)  latex (Unknown)  penicillin (Other)  penicillin (Unknown)  perfume  hives (Other)  potassium acetate (Other)  soap additives hives (Other)        ANTIMICROBIALS:  ceFAZolin   IVPB 2000 every 12 hours      OTHER MEDS:  MEDICATIONS  (STANDING):  acetaminophen    Suspension .. 650 every 6 hours PRN  ALBUTerol    90 MICROgram(s) HFA Inhaler 4 every 6 hours  dextrose 40% Gel 15 once  dextrose 50% Injectable 12.5 once  dextrose 50% Injectable 25 once  dextrose 50% Injectable 25 once  glucagon  Injectable 1 once  heparin  Infusion 1900 <Continuous>  insulin lispro (ADMELOG) corrective regimen sliding scale  every 6 hours  insulin NPH human recombinant 10 every 6 hours  ipratropium 17 MICROgram(s) HFA Inhaler 2 every 6 hours  ketamine Infusion. 0.25 <Continuous>  metoclopramide Injectable 10 every 6 hours  midazolam Infusion 0.08 <Continuous>  midodrine 10 every 8 hours  norepinephrine Infusion 0.05 <Continuous>  pantoprazole  Injectable 40 daily  polyethylene glycol 3350 17 every 12 hours      Vital Signs Last 24 Hrs  T(C): 37.4 (01 Dec 2020 17:00), Max: 37.9 (01 Dec 2020 11:00)  T(F): 99.3 (01 Dec 2020 17:00), Max: 100.2 (01 Dec 2020 11:00)  HR: 98 (01 Dec 2020 17:00) (65 - 108)  BP: --  BP(mean): --  RR: 30 (01 Dec 2020 17:00) (30 - 31)  SpO2: 94% (01 Dec 2020 17:00) (89% - 98%)    PHYSICAL EXAMINATION:  General: Intubated and Sedated  HEENT: +ETT  Neck: Supple  Cardiac: RRR, No M/R/G  Resp: CTAB, No Wh/Rh/Ra  Abdomen: NBS, NT/ND, No HSM, No rigidity or guarding  MSK: No LE edema. No Calf tenderness  : Rivera  Skin: No rashes or lesions. Skin is warm and dry to the touch.   Vascular: [facility] and [facility] (No surrounding erythema, drainage or tenderness to palpation)  Neuro: Intubated and Sedated  Psych: Unable to assess - intubated and sedated    LABORATORY:                          9.6    42.39 )-----------( 201      ( 01 Dec 2020 16:30 )             31.2       12-01    133<L>  |  98  |  25<H>  ----------------------------<  167<H>  4.5   |  21<L>  |  1.18    Ca    8.1<L>      01 Dec 2020 16:30  Phos  3.7     12-01  Mg     2.5     12-01    TPro  6.4  /  Alb  2.8<L>  /  TBili  1.8<H>  /  DBili  x   /  AST  21  /  ALT  17  /  AlkPhos  63  12-01    C-Reactive Protein, Serum: 12.29 mg/dL (11-30-20 @ 05:11)  C-Reactive Protein, Serum: 30.15 mg/dL (11-28-20 @ 23:22)  C-Reactive Protein, Serum: 31.18 mg/dL (11-26-20 @ 23:52)    Ferritin, Serum: 1041 ng/mL (11-30-20 @ 09:19)  Ferritin, Serum: 956 ng/mL (11-29-20 @ 00:57)  Ferritin, Serum: 709 ng/mL (11-27-20 @ 06:15)    D-Dimer Assay, Quantitative: 1072 ng/mL DDU (11-30-20 @ 05:11)  D-Dimer Assay, Quantitative: 2256 ng/mL DDU (11-28-20 @ 23:22)  D-Dimer Assay, Quantitative: 2844 ng/mL DDU (11-26-20 @ 23:52)    MICROBIOLOGY:    .Sputum Sputum  12-01-20 --  --    Moderate Squamous epithelial cells per low power field  No polymorphonuclear leukocytes per low power field  Few Gram Variable Rods per oil power field  Rare Gram positive cocci in pairs per oil power field      .Blood Blood-Peripheral  11-28-20   Growth in aerobic and anaerobic bottles: Staphylococcus aureus See  previous culture [id number]  --    Growth in aerobic bottle: Gram Positive Cocci in Clusters  Growth in anaerobic bottle: Gram Positive Cocci in Clusters      .Blood Blood-Peripheral  11-27-20   Growth in aerobic and anaerobic bottles: Staphylococcus aureus  Growth in anaerobic bottle: Staphylococcus hominis  Coag Negative Staphylococcus  Single set isolate, possible contaminant. Contact  Microbiology if susceptibility testing clinically  indicated.  ***Blood Panel PCR results on this specimen are available  approximately 3 hours after the Gram stain result.***  Gram stain, PCR, and/or culture results may not always  correspond due to difference in methodologies.  ************************************************************  This PCR assay was performed using [employer].  The following targets are tested for: Enterococcus,  vancomycin resistant enterococci, Listeria monocytogenes,  coagulase negative staphylococci, S. aureus,  methicillin resistant S. aureus, Streptococcus agalactiae  (Group B), S. pneumoniae, S. pyogenes (Group A),  Acinetobacter baumannii, Enterobacter cloacae, E. coli,  Klebsiella oxytoca, K. pneumoniae, Proteus sp.,  Serratia marcescens, Haemophilus influenzae,  Neisseria meningitidis, Pseudomonas aeruginosa, Candida  albicans, C. glabrata, C krusei, C parapsilosis,  C. tropicalis and the KPC resistance gene.  --  Blood Culture PCR  Staphylococcus aureus      .Sputum  11-25-20   Numerous Staphylococcus aureus  Normal Respiratory Jocelynn absent  --  Staphylococcus aureus      .Sputum Sputum  11-24-20   Moderate Staphylococcus aureus  Normal Respiratory Jocelynn absent  --  Staphylococcus aureus      .Blood Blood-Peripheral  11-19-20   No Growth Final  --  --    RADIOLOGY:    <The imaging below has been reviewed and visualized by me independently. Findings as detailed in report below>    EXAM:  XR CHEST AP OR PA 1V                        PROCEDURE DATE:  11/29/2020    The heart is enlarged. Diffuse space opacities seen throughout both lungs which remain unchanged when compared to previous study done November 28, 2020. Endotracheal tube is in good position. Changes compatible with pneumonia and or ARDS. NG tube is in the stomach however its tip is not seen on the current study. A central line is seen on the left and the tip is in the superior vena cava. No pneumothorax.

## 2020-12-01 NOTE — PROGRESS NOTE ADULT - SUBJECTIVE AND OBJECTIVE BOX
[facility] DIVISION OF KIDNEY DISEASES AND HYPERTENSION -- FOLLOW UP NOTE  --------------------------------------------------------------------------------  Shashank Murillo   Nephrology Fellow  Pager [facility]: [phone]/ [facility]: [phone]  (After 5 pm or on weekends please page the on-call fellow)      Patient is a 60y old  Female who presents with a chief complaint of Acute hypoxemic respiratory failure 2/2 covid19 (01 Dec 2020 06:53)      24 hour events/subjective: Patient remains on CRRT, changed solution to prismasol given the hyperphosphatemia. Tolerating well now. Fio2 back up to 100, PEEP 16        PAST HISTORY  --------------------------------------------------------------------------------  No significant changes to PMH, PSH, FHx, SHx, unless otherwise noted    ALLERGIES & MEDICATIONS  --------------------------------------------------------------------------------  Allergies    Kiwi (Unknown)  latex (Anaphylaxis)  latex (Unknown)  penicillin (Other)  penicillin (Unknown)  perfume  hives (Other)  potassium acetate (Other)  soap additives hives (Other)    Intolerances      Standing Inpatient Medications  ALBUTerol    90 MICROgram(s) HFA Inhaler 4 Puff(s) Inhalation every 6 hours  BACItracin   Ointment 1 Application(s) Topical two times a day  ceFAZolin   IVPB 2000 milliGRAM(s) IV Intermittent every 12 hours  chlorhexidine 0.12% Liquid 15 milliLiter(s) Oral Mucosa every 12 hours  chlorhexidine 4% Liquid 1 Application(s) Topical <User Schedule>  chlorhexidine 4% Liquid 1 Application(s) Topical <User Schedule>  CRRT Treatment    <Continuous>  dextrose 40% Gel 15 Gram(s) Oral once  dextrose 5%. 1000 milliLiter(s) IV Continuous <Continuous>  dextrose 5%. 1000 milliLiter(s) IV Continuous <Continuous>  dextrose 50% Injectable 25 Gram(s) IV Push once  dextrose 50% Injectable 12.5 Gram(s) IV Push once  dextrose 50% Injectable 25 Gram(s) IV Push once  glucagon  Injectable 1 milliGRAM(s) IntraMuscular once  heparin  Infusion 1900 Unit(s)/Hr IV Continuous <Continuous>  insulin lispro (ADMELOG) corrective regimen sliding scale   SubCutaneous every 6 hours  insulin NPH human recombinant 10 Unit(s) SubCutaneous every 6 hours  ipratropium 17 MICROgram(s) HFA Inhaler 2 Puff(s) Inhalation every 6 hours  ketamine Infusion. 0.25 mG/kG/Hr IV Continuous <Continuous>  metoclopramide Injectable 10 milliGRAM(s) IV Push every 6 hours  midazolam Infusion 0.08 mG/kG/Hr IV Continuous <Continuous>  midodrine 10 milliGRAM(s) Oral every 8 hours  norepinephrine Infusion 0.05 MICROgram(s)/kG/Min IV Continuous <Continuous>  nystatin Powder 1 Application(s) Topical two times a day  pantoprazole  Injectable 40 milliGRAM(s) IV Push daily  petrolatum Ophthalmic Ointment 1 Application(s) Both EYES two times a day  polyethylene glycol 3350 17 Gram(s) Oral every 12 hours  PrismaSATE Dialysate BGK 4 / 2.5 5000 milliLiter(s) CRRT <Continuous>  PrismaSOL Filtration BGK 0 / 2.5 5000 milliLiter(s) CRRT <Continuous>  PrismaSOL Filtration BGK 4 / 2.5 5000 milliLiter(s) CRRT <Continuous>    PRN Inpatient Medications  acetaminophen    Suspension .. 650 milliGRAM(s) Oral every 6 hours PRN  sodium chloride 0.9% lock flush 10 milliLiter(s) IV Push every 1 hour PRN      REVIEW OF SYSTEMS  --------------------------------------------------------------------------------  unable to assess    >>> <<<    VITALS/PHYSICAL EXAM  --------------------------------------------------------------------------------  T(C): 37.8 (12-01-20 @ 12:00), Max: 37.9 (12-01-20 @ 11:00)  HR: 88 (12-01-20 @ 12:00) (64 - 104)  BP: --  RR: 30 (12-01-20 @ 12:00) (30 - 31)  SpO2: 97% (12-01-20 @ 12:00) (91% - 98%)  Wt(kg): --        11-30-20 @ 07:01  -  12-01-20 @ 07:00  --------------------------------------------------------  IN: 1733.4 mL / OUT: 3246 mL / NET: -1512.6 mL    12-01-20 @ 07:01  -  12-01-20 @ 12:21  --------------------------------------------------------  IN: 1007 mL / OUT: 0 mL / NET: 1007 mL      Physical Exam deferred due to COVID19 and PPE preservation    LABS/STUDIES  --------------------------------------------------------------------------------              10.0   40.37 >-----------<  203      [12-01-20 @ 08:28]              31.9     132  |  98  |  25  ----------------------------<  137      [12-01-20 @ 11:22]  4.4   |  21  |  1.22        Ca     8.3     [12-01-20 @ 11:22]      Mg     2.6     [12-01-20 @ 11:22]      Phos  3.8     [12-01-20 @ 11:22]    TPro  6.5  /  Alb  2.8  /  TBili  1.6  /  DBili  x   /  AST  23  /  ALT  15  /  AlkPhos  64  [12-01-20 @ 11:22]    PT/INR: PT 11.0 , INR 0.91       [12-01-20 @ 00:31]  PTT: 71.1       [12-01-20 @ 08:28]          [11-30-20 @ 05:11]    Creatinine Trend:  SCr 1.22 [12-01 @ 11:22]  SCr 1.14 [12-01 @ 06:13]  SCr 1.07 [12-01 @ 00:31]  SCr 1.01 [11-30 @ 20:36]  SCr 1.05 [11-30 @ 13:39]    Urinalysis - [11-26-20 @ 23:57]      Color Yellow / Appearance Slightly Turbid / SG 1.021 / pH 5.5      Gluc Negative / Ketone Negative  / Bili Negative / Urobili Negative       Blood Negative / Protein 30 mg/dL / Leuk Est Moderate / Nitrite Negative      RBC 3 / WBC 8 / Hyaline 4 / Gran  / Sq Epi  / Non Sq Epi 2 / Bacteria Occasional    Urine Protein 40      [11-24-20 @ 13:46]  Urine Sodium <35      [11-26-20 @ 23:56]  Urine Urea Nitrogen 449      [11-24-20 @ 13:46]  Urine Phosphorus 68.0      [11-24-20 @ 13:46]  Urine Osmolality 353      [11-26-20 @ 23:57]    Ferritin 1041      [11-30-20 @ 09:19]  HbA1c 6.6      [04-16-16 @ 06:00]  Lipid: chol --, , HDL --, LDL --      [11-30-20 @ 05:11]    HCV 0.10, Nonreact      [11-19-20 @ 09:32]

## 2020-12-01 NOTE — PROGRESS NOTE ADULT - ATTENDING COMMENTS
Remains on pressor, CRRT  1.  ARF--renal replacement requiring and because of #3 CRRT is preferred.  Volume removal geared to keep FIO2 declining and <0.6.    2.  Hypophosphatemia--fluctuating depending on whether phoxillum used.  Mix/match regular with phoxillum on hemodiltration/dialysis sides may ultimately prove optimal as nutrition improves  3.  Hypotension--volume, hemodynamic optimization

## 2020-12-02 LAB
ALBUMIN SERPL ELPH-MCNC: 2.7 G/DL — LOW (ref 3.3–5)
ALBUMIN SERPL ELPH-MCNC: 2.9 G/DL — LOW (ref 3.3–5)
ALP SERPL-CCNC: 64 U/L — SIGNIFICANT CHANGE UP (ref 40–120)
ALP SERPL-CCNC: 65 U/L — SIGNIFICANT CHANGE UP (ref 40–120)
ALT FLD-CCNC: 15 U/L — SIGNIFICANT CHANGE UP (ref 10–45)
ALT FLD-CCNC: 16 U/L — SIGNIFICANT CHANGE UP (ref 10–45)
ANION GAP SERPL CALC-SCNC: 10 MMOL/L — SIGNIFICANT CHANGE UP (ref 5–17)
ANION GAP SERPL CALC-SCNC: 13 MMOL/L — SIGNIFICANT CHANGE UP (ref 5–17)
ANION GAP SERPL CALC-SCNC: 14 MMOL/L — SIGNIFICANT CHANGE UP (ref 5–17)
ANION GAP SERPL CALC-SCNC: 15 MMOL/L — SIGNIFICANT CHANGE UP (ref 5–17)
ANISOCYTOSIS BLD QL: SLIGHT — SIGNIFICANT CHANGE UP
APTT BLD: 72.2 SEC — HIGH (ref 27.5–35.5)
AST SERPL-CCNC: 23 U/L — SIGNIFICANT CHANGE UP (ref 10–40)
AST SERPL-CCNC: 26 U/L — SIGNIFICANT CHANGE UP (ref 10–40)
BASOPHILS # BLD AUTO: 0 K/UL — SIGNIFICANT CHANGE UP (ref 0–0.2)
BASOPHILS NFR BLD AUTO: 0 % — SIGNIFICANT CHANGE UP (ref 0–2)
BILIRUB SERPL-MCNC: 1.6 MG/DL — HIGH (ref 0.2–1.2)
BILIRUB SERPL-MCNC: 1.9 MG/DL — HIGH (ref 0.2–1.2)
BUN SERPL-MCNC: 22 MG/DL — SIGNIFICANT CHANGE UP (ref 7–23)
BUN SERPL-MCNC: 23 MG/DL — SIGNIFICANT CHANGE UP (ref 7–23)
BUN SERPL-MCNC: 24 MG/DL — HIGH (ref 7–23)
BUN SERPL-MCNC: 26 MG/DL — HIGH (ref 7–23)
CALCIUM SERPL-MCNC: 8.2 MG/DL — LOW (ref 8.4–10.5)
CALCIUM SERPL-MCNC: 8.3 MG/DL — LOW (ref 8.4–10.5)
CALCIUM SERPL-MCNC: 8.5 MG/DL — SIGNIFICANT CHANGE UP (ref 8.4–10.5)
CALCIUM SERPL-MCNC: 8.6 MG/DL — SIGNIFICANT CHANGE UP (ref 8.4–10.5)
CHLORIDE SERPL-SCNC: 100 MMOL/L — SIGNIFICANT CHANGE UP (ref 96–108)
CHLORIDE SERPL-SCNC: 100 MMOL/L — SIGNIFICANT CHANGE UP (ref 96–108)
CHLORIDE SERPL-SCNC: 98 MMOL/L — SIGNIFICANT CHANGE UP (ref 96–108)
CHLORIDE SERPL-SCNC: 99 MMOL/L — SIGNIFICANT CHANGE UP (ref 96–108)
CO2 SERPL-SCNC: 20 MMOL/L — LOW (ref 22–31)
CO2 SERPL-SCNC: 20 MMOL/L — LOW (ref 22–31)
CO2 SERPL-SCNC: 21 MMOL/L — LOW (ref 22–31)
CO2 SERPL-SCNC: 22 MMOL/L — SIGNIFICANT CHANGE UP (ref 22–31)
CREAT SERPL-MCNC: 1.09 MG/DL — SIGNIFICANT CHANGE UP (ref 0.5–1.3)
CREAT SERPL-MCNC: 1.15 MG/DL — SIGNIFICANT CHANGE UP (ref 0.5–1.3)
CREAT SERPL-MCNC: 1.23 MG/DL — SIGNIFICANT CHANGE UP (ref 0.5–1.3)
CREAT SERPL-MCNC: 1.27 MG/DL — SIGNIFICANT CHANGE UP (ref 0.5–1.3)
CRP SERPL-MCNC: 25.79 MG/DL — HIGH (ref 0–0.4)
D DIMER BLD IA.RAPID-MCNC: 2023 NG/ML DDU — HIGH
ELLIPTOCYTES BLD QL SMEAR: SLIGHT — SIGNIFICANT CHANGE UP
EOSINOPHIL # BLD AUTO: 0.42 K/UL — SIGNIFICANT CHANGE UP (ref 0–0.5)
EOSINOPHIL NFR BLD AUTO: 0.9 % — SIGNIFICANT CHANGE UP (ref 0–6)
FERRITIN SERPL-MCNC: 1309 NG/ML — HIGH (ref 15–150)
GAS PNL BLDA: SIGNIFICANT CHANGE UP
GAS PNL BLDA: SIGNIFICANT CHANGE UP
GIANT PLATELETS BLD QL SMEAR: PRESENT — SIGNIFICANT CHANGE UP
GLUCOSE BLDC GLUCOMTR-MCNC: 153 MG/DL — HIGH (ref 70–99)
GLUCOSE BLDC GLUCOMTR-MCNC: 162 MG/DL — HIGH (ref 70–99)
GLUCOSE BLDC GLUCOMTR-MCNC: 170 MG/DL — HIGH (ref 70–99)
GLUCOSE BLDC GLUCOMTR-MCNC: 172 MG/DL — HIGH (ref 70–99)
GLUCOSE BLDC GLUCOMTR-MCNC: 207 MG/DL — HIGH (ref 70–99)
GLUCOSE SERPL-MCNC: 144 MG/DL — HIGH (ref 70–99)
GLUCOSE SERPL-MCNC: 167 MG/DL — HIGH (ref 70–99)
GLUCOSE SERPL-MCNC: 168 MG/DL — HIGH (ref 70–99)
GLUCOSE SERPL-MCNC: 207 MG/DL — HIGH (ref 70–99)
HCT VFR BLD CALC: 30.9 % — LOW (ref 34.5–45)
HGB BLD-MCNC: 9.8 G/DL — LOW (ref 11.5–15.5)
INR BLD: 0.97 RATIO — SIGNIFICANT CHANGE UP (ref 0.88–1.16)
LDH SERPL L TO P-CCNC: 386 U/L — HIGH (ref 50–242)
LYMPHOCYTES # BLD AUTO: 2.06 K/UL — SIGNIFICANT CHANGE UP (ref 1–3.3)
LYMPHOCYTES # BLD AUTO: 4.4 % — LOW (ref 13–44)
MACROCYTES BLD QL: SLIGHT — SIGNIFICANT CHANGE UP
MAGNESIUM SERPL-MCNC: 2.5 MG/DL — SIGNIFICANT CHANGE UP (ref 1.6–2.6)
MAGNESIUM SERPL-MCNC: 2.6 MG/DL — SIGNIFICANT CHANGE UP (ref 1.6–2.6)
MAGNESIUM SERPL-MCNC: 2.6 MG/DL — SIGNIFICANT CHANGE UP (ref 1.6–2.6)
MAGNESIUM SERPL-MCNC: 2.7 MG/DL — HIGH (ref 1.6–2.6)
MANUAL SMEAR VERIFICATION: SIGNIFICANT CHANGE UP
MCHC RBC-ENTMCNC: 28.5 PG — SIGNIFICANT CHANGE UP (ref 27–34)
MCHC RBC-ENTMCNC: 31.7 GM/DL — LOW (ref 32–36)
MCV RBC AUTO: 89.8 FL — SIGNIFICANT CHANGE UP (ref 80–100)
METAMYELOCYTES # FLD: 3.5 % — HIGH (ref 0–0)
MONOCYTES # BLD AUTO: 2.06 K/UL — HIGH (ref 0–0.9)
MONOCYTES NFR BLD AUTO: 4.4 % — SIGNIFICANT CHANGE UP (ref 2–14)
MYELOCYTES NFR BLD: 2.6 % — HIGH (ref 0–0)
NEUTROPHILS # BLD AUTO: 38.98 K/UL — HIGH (ref 1.8–7.4)
NEUTROPHILS NFR BLD AUTO: 80.7 % — HIGH (ref 43–77)
NEUTS BAND # BLD: 2.6 % — SIGNIFICANT CHANGE UP (ref 0–8)
OVALOCYTES BLD QL SMEAR: SLIGHT — SIGNIFICANT CHANGE UP
PHOSPHATE SERPL-MCNC: 3.2 MG/DL — SIGNIFICANT CHANGE UP (ref 2.5–4.5)
PHOSPHATE SERPL-MCNC: 3.3 MG/DL — SIGNIFICANT CHANGE UP (ref 2.5–4.5)
PHOSPHATE SERPL-MCNC: 3.4 MG/DL — SIGNIFICANT CHANGE UP (ref 2.5–4.5)
PHOSPHATE SERPL-MCNC: 4.4 MG/DL — SIGNIFICANT CHANGE UP (ref 2.5–4.5)
PLAT MORPH BLD: ABNORMAL
PLATELET # BLD AUTO: 200 K/UL — SIGNIFICANT CHANGE UP (ref 150–400)
POTASSIUM SERPL-MCNC: 4.4 MMOL/L — SIGNIFICANT CHANGE UP (ref 3.5–5.3)
POTASSIUM SERPL-MCNC: 4.5 MMOL/L — SIGNIFICANT CHANGE UP (ref 3.5–5.3)
POTASSIUM SERPL-MCNC: 4.7 MMOL/L — SIGNIFICANT CHANGE UP (ref 3.5–5.3)
POTASSIUM SERPL-MCNC: 4.8 MMOL/L — SIGNIFICANT CHANGE UP (ref 3.5–5.3)
POTASSIUM SERPL-SCNC: 4.4 MMOL/L — SIGNIFICANT CHANGE UP (ref 3.5–5.3)
POTASSIUM SERPL-SCNC: 4.5 MMOL/L — SIGNIFICANT CHANGE UP (ref 3.5–5.3)
POTASSIUM SERPL-SCNC: 4.7 MMOL/L — SIGNIFICANT CHANGE UP (ref 3.5–5.3)
POTASSIUM SERPL-SCNC: 4.8 MMOL/L — SIGNIFICANT CHANGE UP (ref 3.5–5.3)
PROT SERPL-MCNC: 6.6 G/DL — SIGNIFICANT CHANGE UP (ref 6–8.3)
PROT SERPL-MCNC: 6.7 G/DL — SIGNIFICANT CHANGE UP (ref 6–8.3)
PROTHROM AB SERPL-ACNC: 11.7 SEC — SIGNIFICANT CHANGE UP (ref 10.6–13.6)
RBC # BLD: 3.44 M/UL — LOW (ref 3.8–5.2)
RBC # FLD: 17.1 % — HIGH (ref 10.3–14.5)
RBC BLD AUTO: SIGNIFICANT CHANGE UP
SODIUM SERPL-SCNC: 132 MMOL/L — LOW (ref 135–145)
SODIUM SERPL-SCNC: 133 MMOL/L — LOW (ref 135–145)
SODIUM SERPL-SCNC: 133 MMOL/L — LOW (ref 135–145)
SODIUM SERPL-SCNC: 134 MMOL/L — LOW (ref 135–145)
TARGETS BLD QL SMEAR: SLIGHT — SIGNIFICANT CHANGE UP
VARIANT LYMPHS # BLD: 0.9 % — SIGNIFICANT CHANGE UP (ref 0–6)
WBC # BLD: 46.8 K/UL — CRITICAL HIGH (ref 3.8–10.5)
WBC # FLD AUTO: 46.8 K/UL — CRITICAL HIGH (ref 3.8–10.5)

## 2020-12-02 PROCEDURE — 93010 ELECTROCARDIOGRAM REPORT: CPT | Mod: 77

## 2020-12-02 PROCEDURE — 99233 SBSQ HOSP IP/OBS HIGH 50: CPT

## 2020-12-02 PROCEDURE — 93010 ELECTROCARDIOGRAM REPORT: CPT

## 2020-12-02 PROCEDURE — 99291 CRITICAL CARE FIRST HOUR: CPT

## 2020-12-02 PROCEDURE — 99233 SBSQ HOSP IP/OBS HIGH 50: CPT | Mod: GC

## 2020-12-02 RX ORDER — MIDAZOLAM HYDROCHLORIDE 1 MG/ML
4 INJECTION, SOLUTION INTRAMUSCULAR; INTRAVENOUS ONCE
Refills: 0 | Status: DISCONTINUED | OUTPATIENT
Start: 2020-12-02 | End: 2020-12-02

## 2020-12-02 RX ORDER — BUMETANIDE 0.25 MG/ML
4 INJECTION INTRAMUSCULAR; INTRAVENOUS ONCE
Refills: 0 | Status: COMPLETED | OUTPATIENT
Start: 2020-12-02 | End: 2020-12-02

## 2020-12-02 RX ADMIN — MIDAZOLAM HYDROCHLORIDE 4 MILLIGRAM(S): 1 INJECTION, SOLUTION INTRAMUSCULAR; INTRAVENOUS at 12:30

## 2020-12-02 RX ADMIN — HUMAN INSULIN 10 UNIT(S): 100 INJECTION, SUSPENSION SUBCUTANEOUS at 12:02

## 2020-12-02 RX ADMIN — Medication 2 PUFF(S): at 05:14

## 2020-12-02 RX ADMIN — POLYETHYLENE GLYCOL 3350 17 GRAM(S): 17 POWDER, FOR SOLUTION ORAL at 05:10

## 2020-12-02 RX ADMIN — Medication 1 APPLICATION(S): at 17:10

## 2020-12-02 RX ADMIN — CHLORHEXIDINE GLUCONATE 15 MILLILITER(S): 213 SOLUTION TOPICAL at 17:11

## 2020-12-02 RX ADMIN — NYSTATIN CREAM 1 APPLICATION(S): 100000 CREAM TOPICAL at 17:18

## 2020-12-02 RX ADMIN — Medication 1 APPLICATION(S): at 17:09

## 2020-12-02 RX ADMIN — Medication 2 PUFF(S): at 17:35

## 2020-12-02 RX ADMIN — HEPARIN SODIUM 22 UNIT(S)/HR: 5000 INJECTION INTRAVENOUS; SUBCUTANEOUS at 04:47

## 2020-12-02 RX ADMIN — Medication 1: at 22:28

## 2020-12-02 RX ADMIN — MIDAZOLAM HYDROCHLORIDE 10.9 MG/KG/HR: 1 INJECTION, SOLUTION INTRAMUSCULAR; INTRAVENOUS at 22:24

## 2020-12-02 RX ADMIN — CHLORHEXIDINE GLUCONATE 1 APPLICATION(S): 213 SOLUTION TOPICAL at 05:06

## 2020-12-02 RX ADMIN — Medication 10 MILLIGRAM(S): at 23:05

## 2020-12-02 RX ADMIN — NYSTATIN CREAM 1 APPLICATION(S): 100000 CREAM TOPICAL at 05:09

## 2020-12-02 RX ADMIN — Medication 10 MILLIGRAM(S): at 12:04

## 2020-12-02 RX ADMIN — Medication 1 APPLICATION(S): at 05:05

## 2020-12-02 RX ADMIN — ALBUTEROL 4 PUFF(S): 90 AEROSOL, METERED ORAL at 12:37

## 2020-12-02 RX ADMIN — CASPOFUNGIN ACETATE 260 MILLIGRAM(S): 7 INJECTION, POWDER, LYOPHILIZED, FOR SOLUTION INTRAVENOUS at 22:24

## 2020-12-02 RX ADMIN — MIDODRINE HYDROCHLORIDE 10 MILLIGRAM(S): 2.5 TABLET ORAL at 22:25

## 2020-12-02 RX ADMIN — POLYETHYLENE GLYCOL 3350 17 GRAM(S): 17 POWDER, FOR SOLUTION ORAL at 17:16

## 2020-12-02 RX ADMIN — HUMAN INSULIN 10 UNIT(S): 100 INJECTION, SUSPENSION SUBCUTANEOUS at 17:17

## 2020-12-02 RX ADMIN — Medication 10 MILLIGRAM(S): at 17:15

## 2020-12-02 RX ADMIN — ALBUTEROL 4 PUFF(S): 90 AEROSOL, METERED ORAL at 17:35

## 2020-12-02 RX ADMIN — Medication 100 MILLIGRAM(S): at 06:04

## 2020-12-02 RX ADMIN — Medication 10 MILLIGRAM(S): at 05:05

## 2020-12-02 RX ADMIN — Medication 2 PUFF(S): at 23:17

## 2020-12-02 RX ADMIN — ALBUTEROL 4 PUFF(S): 90 AEROSOL, METERED ORAL at 05:15

## 2020-12-02 RX ADMIN — CASPOFUNGIN ACETATE 70 MILLIGRAM(S): 7 INJECTION, POWDER, LYOPHILIZED, FOR SOLUTION INTRAVENOUS at 00:52

## 2020-12-02 RX ADMIN — MIDODRINE HYDROCHLORIDE 10 MILLIGRAM(S): 2.5 TABLET ORAL at 05:05

## 2020-12-02 RX ADMIN — Medication 1: at 06:06

## 2020-12-02 RX ADMIN — Medication 12.8 MICROGRAM(S)/KG/MIN: at 22:25

## 2020-12-02 RX ADMIN — BUMETANIDE 132 MILLIGRAM(S): 0.25 INJECTION INTRAMUSCULAR; INTRAVENOUS at 13:47

## 2020-12-02 RX ADMIN — Medication 1 APPLICATION(S): at 05:04

## 2020-12-02 RX ADMIN — PANTOPRAZOLE SODIUM 40 MILLIGRAM(S): 20 TABLET, DELAYED RELEASE ORAL at 12:04

## 2020-12-02 RX ADMIN — HEPARIN SODIUM 22 UNIT(S)/HR: 5000 INJECTION INTRAVENOUS; SUBCUTANEOUS at 00:51

## 2020-12-02 RX ADMIN — HUMAN INSULIN 10 UNIT(S): 100 INJECTION, SUSPENSION SUBCUTANEOUS at 00:15

## 2020-12-02 RX ADMIN — CHLORHEXIDINE GLUCONATE 1 APPLICATION(S): 213 SOLUTION TOPICAL at 12:01

## 2020-12-02 RX ADMIN — Medication 100 MILLIGRAM(S): at 17:13

## 2020-12-02 RX ADMIN — Medication 1: at 00:10

## 2020-12-02 RX ADMIN — MIDODRINE HYDROCHLORIDE 10 MILLIGRAM(S): 2.5 TABLET ORAL at 13:49

## 2020-12-02 RX ADMIN — KETAMINE HYDROCHLORIDE 3.4 MG/KG/HR: 100 INJECTION INTRAMUSCULAR; INTRAVENOUS at 01:29

## 2020-12-02 RX ADMIN — KETAMINE HYDROCHLORIDE 3.4 MG/KG/HR: 100 INJECTION INTRAMUSCULAR; INTRAVENOUS at 06:51

## 2020-12-02 RX ADMIN — Medication 2 PUFF(S): at 12:37

## 2020-12-02 RX ADMIN — HUMAN INSULIN 10 UNIT(S): 100 INJECTION, SUSPENSION SUBCUTANEOUS at 22:27

## 2020-12-02 RX ADMIN — HUMAN INSULIN 10 UNIT(S): 100 INJECTION, SUSPENSION SUBCUTANEOUS at 06:08

## 2020-12-02 RX ADMIN — Medication 1: at 17:17

## 2020-12-02 RX ADMIN — Medication 2: at 12:02

## 2020-12-02 RX ADMIN — KETAMINE HYDROCHLORIDE 3.4 MG/KG/HR: 100 INJECTION INTRAMUSCULAR; INTRAVENOUS at 22:24

## 2020-12-02 RX ADMIN — ALBUTEROL 4 PUFF(S): 90 AEROSOL, METERED ORAL at 23:17

## 2020-12-02 RX ADMIN — CHLORHEXIDINE GLUCONATE 15 MILLILITER(S): 213 SOLUTION TOPICAL at 05:09

## 2020-12-02 NOTE — PROGRESS NOTE ADULT - ATTENDING COMMENTS
1. Acute hypoxemic respiratory with ARDS from SARS 2, Covid -19  pneumonia. Pt also with morbid obesity with BMI~58. Continue lung protective ventilation. Decrease FIO2 to 65. . Continue PEEP 16. Stop decadron.  2..Id. S. Aureus ,S. Hominus, and Proteus bacteremia. Continue Cefazolin. WBC increasing. Send fungitel and fungal cx. Started on caspofungin. CT Chest /abd/pelvis when able. Pt desaturated when placed flat today.  3. Renal failure from ATN. Continue CVVHD with fluid removal. Trial of Bumex 4 IV P x 1    4. Hypotension from septic shock. Still dependent on norepinephrine.

## 2020-12-02 NOTE — PROGRESS NOTE ADULT - SUBJECTIVE AND OBJECTIVE BOX
Follow Up:  COVID19, Bacteremia    Interval History:    REVIEW OF SYSTEMS  [  ] ROS unobtainable because:    [  ] All other systems negative except as noted below:     Constitutional:  [ ] fever [ ] chills  [ ] weight loss  [ ] weakness  Skin:  [ ] rash [ ] phlebitis	  Eyes: [ ] icterus [ ] pain  [ ] discharge	  ENMT: [ ] sore throat  [ ] thrush [ ] ulcers [ ] exudates  Respiratory: [  ] dyspnea [ ] hemoptysis [ ] cough [ ] sputum	  Cardiovascular:  [ ] chest pain [ ] palpitations [ ] edema	  Gastrointestinal:  [ ] nausea [ ] vomiting [ ] diarrhea [ ] constipation [ ] pain	  Genitourinary:  [ ] dysuria [ ] frequency [ ] hematuria [ ] discharge [ ] flank pain  [ ] incontinence  Musculoskeletal:  [ ] myalgias [ ] arthralgias [ ] arthritis  [ ] back pain  Neurological:  [ ] headache [ ] seizures  [ ] confusion/altered mental status    Allergies  Kiwi (Unknown)  latex (Anaphylaxis)  latex (Unknown)  penicillin (Other)  penicillin (Unknown)  perfume  hives (Other)  potassium acetate (Other)  soap additives hives (Other)        ANTIMICROBIALS:  caspofungin IVPB    caspofungin IVPB 50 every 24 hours  ceFAZolin   IVPB 2000 every 12 hours      OTHER MEDS:  MEDICATIONS  (STANDING):  acetaminophen    Suspension .. 650 every 6 hours PRN  ALBUTerol    90 MICROgram(s) HFA Inhaler 4 every 6 hours  buMETAnide IVPB 4 once  dextrose 40% Gel 15 once  dextrose 50% Injectable 25 once  dextrose 50% Injectable 12.5 once  dextrose 50% Injectable 25 once  glucagon  Injectable 1 once  heparin  Infusion 1900 <Continuous>  insulin lispro (ADMELOG) corrective regimen sliding scale  every 6 hours  insulin NPH human recombinant 10 every 6 hours  ipratropium 17 MICROgram(s) HFA Inhaler 2 every 6 hours  ketamine Infusion. 0.25 <Continuous>  metoclopramide Injectable 10 every 6 hours  midazolam Infusion 0.08 <Continuous>  midodrine 10 every 8 hours  norepinephrine Infusion 0.05 <Continuous>  pantoprazole  Injectable 40 daily  polyethylene glycol 3350 17 every 12 hours      Vital Signs Last 24 Hrs  T(C): 37.2 (02 Dec 2020 08:00), Max: 37.8 (01 Dec 2020 11:30)  T(F): 99 (02 Dec 2020 08:00), Max: 100 (01 Dec 2020 11:30)  HR: 98 (02 Dec 2020 10:00) (88 - 108)  BP: --  BP(mean): --  RR: 35 (02 Dec 2020 10:00) (30 - 35)  SpO2: 90% (02 Dec 2020 10:00) (89% - 98%)    PHYSICAL EXAMINATION:    General: Alert and Awake, NAD  HEENT: PERRL, EOMI  Neck: Supple  Cardiac: RRR, No M/R/G  Resp: CTAB, No Wh/Rh/Ra  Abdomen: NBS, NT/ND, No HSM, No rigidity or guarding  MSK: No LE edema. No Calf tenderness  : No negro  Skin: No rashes or lesions. Skin is warm and dry to the touch.   Neuro: Alert and Awake. CN 2-12 Grossly intact. Moves all four extremities spontaneously.  Psych: Calm, Pleasant, Cooperative    LABORATORY:                          9.8    46.80 )-----------( 200      ( 02 Dec 2020 00:17 )             30.9       12-02    133<L>  |  99  |  22  ----------------------------<  167<H>  4.5   |  20<L>  |  1.09    Ca    8.2<L>      02 Dec 2020 06:08  Phos  3.2     12-02  Mg     2.6     12-02    TPro  6.7  /  Alb  2.9<L>  /  TBili  1.9<H>  /  DBili  x   /  AST  23  /  ALT  16  /  AlkPhos  65  12-02          C-Reactive Protein, Serum: 25.79 mg/dL (12-02-20 @ 00:17)  C-Reactive Protein, Serum: 12.29 mg/dL (11-30-20 @ 05:11)  C-Reactive Protein, Serum: 30.15 mg/dL (11-28-20 @ 23:22)      Ferritin, Serum: 1309 ng/mL (12-02-20 @ 03:52)  Ferritin, Serum: 1041 ng/mL (11-30-20 @ 09:19)  Ferritin, Serum: 956 ng/mL (11-29-20 @ 00:57)      D-Dimer Assay, Quantitative: 2023 ng/mL DDU (12-02-20 @ 00:17)  D-Dimer Assay, Quantitative: 1072 ng/mL DDU (11-30-20 @ 05:11)  D-Dimer Assay, Quantitative: 2256 ng/mL DDU (11-28-20 @ 23:22)          MICROBIOLOGY:  v  .Sputum Sputum  12-01-20   Moderate Stenotrophomonas maltophilia  --    Moderate Squamous epithelial cells per low power field  No polymorphonuclear leukocytes per low power field  Few Gram Variable Rods per oil power field  Rare Gram positive cocci in pairs per oil power field      .Blood Blood-Peripheral  12-01-20   No growth to date.  --  --      .Urine Clean Catch (Midstream)  11-30-20   <10,000 CFU/mL Normal Urogenital Jocelynn  --  --      .Blood Blood-Peripheral  11-28-20   Growth in aerobic and anaerobic bottles: Staphylococcus aureus See  previous culture [id number]  --    Growth in aerobic bottle: Gram Positive Cocci in Clusters  Growth in anaerobic bottle: Gram Positive Cocci in Clusters      .Blood Blood-Peripheral  11-27-20   Growth in aerobic and anaerobic bottles: Staphylococcus aureus  Growth in anaerobic bottle: Staphylococcus hominis  Coag Negative Staphylococcus  Single set isolate, possible contaminant. Contact  Microbiology if susceptibility testing clinically  indicated.  ***Blood Panel PCR results on this specimen are available  approximately 3 hours after the Gram stain result.***  Gram stain, PCR, and/or culture results may not always  correspond due to difference in methodologies.  ************************************************************  This PCR assay was performed using [employer].  The following targets are tested for: Enterococcus,  vancomycin resistant enterococci, Listeria monocytogenes,  coagulase negative staphylococci, S. aureus,  methicillin resistant S. aureus, Streptococcus agalactiae  (Group B), S. pneumoniae, S. pyogenes (Group A),  Acinetobacter baumannii, Enterobacter cloacae, E. coli,  Klebsiella oxytoca, K. pneumoniae, Proteus sp.,  Serratia marcescens, Haemophilus influenzae,  Neisseria meningitidis, Pseudomonas aeruginosa, Candida  albicans, C. glabrata, C krusei, C parapsilosis,  C. tropicalis and the KPC resistance gene.  --  Blood Culture PCR  Staphylococcus aureus      .Sputum  11-25-20   Numerous Staphylococcus aureus  Normal Respiratory Jocelynn absent  --  Staphylococcus aureus      .Sputum Sputum  11-24-20   Moderate Staphylococcus aureus  Normal Respiratory Jocelynn absent  --  Staphylococcus aureus      .Blood Blood-Peripheral  11-19-20   No Growth Final  --  --                RADIOLOGY:    <The imaging below has been reviewed and visualized by me independently. Findings as detailed in report below> Follow Up:  COVID19, Bacteremia    Interval History: afebrile. desaturated when laid flat in the bed; will not be able to tolerate CT at this time. per RN no diarrhea and minimal bloody secretions.     REVIEW OF SYSTEMS  [ x ] ROS unobtainable because:  intubated and sedated  [  ] All other systems negative except as noted below:     Constitutional:  [ ] fever [ ] chills  [ ] weight loss  [ ] weakness  Skin:  [ ] rash [ ] phlebitis	  Eyes: [ ] icterus [ ] pain  [ ] discharge	  ENMT: [ ] sore throat  [ ] thrush [ ] ulcers [ ] exudates  Respiratory: [  ] dyspnea [ ] hemoptysis [ ] cough [ ] sputum	  Cardiovascular:  [ ] chest pain [ ] palpitations [ ] edema	  Gastrointestinal:  [ ] nausea [ ] vomiting [ ] diarrhea [ ] constipation [ ] pain	  Genitourinary:  [ ] dysuria [ ] frequency [ ] hematuria [ ] discharge [ ] flank pain  [ ] incontinence  Musculoskeletal:  [ ] myalgias [ ] arthralgias [ ] arthritis  [ ] back pain  Neurological:  [ ] headache [ ] seizures  [ ] confusion/altered mental status    Allergies  Kiwi (Unknown)  latex (Anaphylaxis)  latex (Unknown)  penicillin (Other)  penicillin (Unknown)  perfume  hives (Other)  potassium acetate (Other)  soap additives hives (Other)        ANTIMICROBIALS:  caspofungin IVPB    caspofungin IVPB 50 every 24 hours  ceFAZolin   IVPB 2000 every 12 hours      OTHER MEDS:  MEDICATIONS  (STANDING):  acetaminophen    Suspension .. 650 every 6 hours PRN  ALBUTerol    90 MICROgram(s) HFA Inhaler 4 every 6 hours  buMETAnide IVPB 4 once  dextrose 40% Gel 15 once  dextrose 50% Injectable 25 once  dextrose 50% Injectable 12.5 once  dextrose 50% Injectable 25 once  glucagon  Injectable 1 once  heparin  Infusion 1900 <Continuous>  insulin lispro (ADMELOG) corrective regimen sliding scale  every 6 hours  insulin NPH human recombinant 10 every 6 hours  ipratropium 17 MICROgram(s) HFA Inhaler 2 every 6 hours  ketamine Infusion. 0.25 <Continuous>  metoclopramide Injectable 10 every 6 hours  midazolam Infusion 0.08 <Continuous>  midodrine 10 every 8 hours  norepinephrine Infusion 0.05 <Continuous>  pantoprazole  Injectable 40 daily  polyethylene glycol 3350 17 every 12 hours      Vital Signs Last 24 Hrs  T(C): 37.2 (02 Dec 2020 08:00), Max: 37.8 (01 Dec 2020 11:30)  T(F): 99 (02 Dec 2020 08:00), Max: 100 (01 Dec 2020 11:30)  HR: 98 (02 Dec 2020 10:00) (88 - 108)  BP: --  BP(mean): --  RR: 35 (02 Dec 2020 10:00) (30 - 35)  SpO2: 90% (02 Dec 2020 10:00) (89% - 98%)    PHYSICAL EXAMINATION:  General: Intubated and Sedated  HEENT: +ETT  Neck: Supple  Cardiac: RRR, No M/R/G  Resp: CTAB, No Wh/Rh/Ra  Abdomen: NBS, NT/ND, No HSM, No rigidity or guarding  MSK: No LE edema. No Calf tenderness  : Rivera  Skin: No rashes or lesions. Skin is warm and dry to the touch.   Vascular: RIJ and [facility] (No surrounding erythema, drainage or tenderness to palpation)  Neuro: Intubated and Sedated  Psych: Unable to assess - intubated and sedated    LABORATORY:                          9.8    46.80 )-----------( 200      ( 02 Dec 2020 00:17 )             30.9       12-02    133<L>  |  99  |  22  ----------------------------<  167<H>  4.5   |  20<L>  |  1.09    Ca    8.2<L>      02 Dec 2020 06:08  Phos  3.2     12-02  Mg     2.6     12-02    TPro  6.7  /  Alb  2.9<L>  /  TBili  1.9<H>  /  DBili  x   /  AST  23  /  ALT  16  /  AlkPhos  65  12-02    C-Reactive Protein, Serum: 25.79 mg/dL (12-02-20 @ 00:17)  C-Reactive Protein, Serum: 12.29 mg/dL (11-30-20 @ 05:11)  C-Reactive Protein, Serum: 30.15 mg/dL (11-28-20 @ 23:22)    Ferritin, Serum: 1309 ng/mL (12-02-20 @ 03:52)  Ferritin, Serum: 1041 ng/mL (11-30-20 @ 09:19)  Ferritin, Serum: 956 ng/mL (11-29-20 @ 00:57)    D-Dimer Assay, Quantitative: 2023 ng/mL DDU (12-02-20 @ 00:17)  D-Dimer Assay, Quantitative: 1072 ng/mL DDU (11-30-20 @ 05:11)  D-Dimer Assay, Quantitative: 2256 ng/mL DDU (11-28-20 @ 23:22)    MICROBIOLOGY:    .Sputum Sputum  12-01-20   Moderate Stenotrophomonas maltophilia  --    Moderate Squamous epithelial cells per low power field  No polymorphonuclear leukocytes per low power field  Few Gram Variable Rods per oil power field  Rare Gram positive cocci in pairs per oil power field      .Blood Blood-Peripheral  12-01-20   No growth to date.  --  --      .Urine Clean Catch (Midstream)  11-30-20   <10,000 CFU/mL Normal Urogenital Jocelynn  --  --      .Blood Blood-Peripheral  11-28-20   Growth in aerobic and anaerobic bottles: Staphylococcus aureus See  previous culture [id number]  --    Growth in aerobic bottle: Gram Positive Cocci in Clusters  Growth in anaerobic bottle: Gram Positive Cocci in Clusters      .Blood Blood-Peripheral  11-27-20   Growth in aerobic and anaerobic bottles: Staphylococcus aureus  Growth in anaerobic bottle: Staphylococcus hominis  Coag Negative Staphylococcus  Single set isolate, possible contaminant. Contact  Microbiology if susceptibility testing clinically  indicated.  ***Blood Panel PCR results on this specimen are available  approximately 3 hours after the Gram stain result.***  Gram stain, PCR, and/or culture results may not always  correspond due to difference in methodologies.  ************************************************************  This PCR assay was performed using [employer].  The following targets are tested for: Enterococcus,  vancomycin resistant enterococci, Listeria monocytogenes,  coagulase negative staphylococci, S. aureus,  methicillin resistant S. aureus, Streptococcus agalactiae  (Group B), S. pneumoniae, S. pyogenes (Group A),  Acinetobacter baumannii, Enterobacter cloacae, E. coli,  Klebsiella oxytoca, K. pneumoniae, Proteus sp.,  Serratia marcescens, Haemophilus influenzae,  Neisseria meningitidis, Pseudomonas aeruginosa, Candida  albicans, C. glabrata, C krusei, C parapsilosis,  C. tropicalis and the KPC resistance gene.  --  Blood Culture PCR  Staphylococcus aureus      .Sputum  11-25-20   Numerous Staphylococcus aureus  Normal Respiratory Jocelynn absent  --  Staphylococcus aureus      .Sputum Sputum  11-24-20   Moderate Staphylococcus aureus  Normal Respiratory Jocelynn absent  --  Staphylococcus aureus      .Blood Blood-Peripheral  11-19-20   No Growth Final  --  --                RADIOLOGY:    <The imaging below has been reviewed and visualized by me independently. Findings as detailed in report below>

## 2020-12-02 NOTE — PROGRESS NOTE ADULT - ASSESSMENT
Pt is a 59 y/o F w PMH of AARON, HTN, HLD, prior DVT/PE hx, asthma presented to [facility] for SOB and productive cough prior to admission. Admitted for acute hypoxic respiratory failure 2/2 to COVID infection. Was subsequently intubated and proned on 11/23//20. Was transferred to ICU for further management. Was started on pressors and nimbex drip. Nephrology consulted for ARTEMIO    #ARTEMIO  Pt with ARTEMIO in the setting of septic shock 2/2 to COVID infection. Now likely in ATN. Noted to have Scr of 0.53 on 11/23/20, then Scr further increased to 1.64 on 11/24/20, 2.18 on 11/25/20 and then further to 2.57 on 11/26/20 and 3.0 on 11/27. Patient was started on CRRT and patient now anuric. Adequate clearances noted on CRRT, tolerating well with UF, currently goal net even. Bicarb still low at 20, consider check BHB and increasing TF goal as pt likely has component of starvation ketosis. C/w prismasol solution at this time. Consider diuretic challenge-> 4mg bumex. If pt remains hemodynamically stable can consider transition to iHD. Monitor labs and urine output. Avoid NSAIDs, ACEI/ARBS, RCA and nephrotoxins. Dose medications as per CRRT

## 2020-12-02 NOTE — PROGRESS NOTE ADULT - ASSESSMENT
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 - used symbicort intermittently), AARON, peritonitis s/p Oral's procedure and ileostomy (reversed 2011) admitted with COVID19    Developed MSSA Bacteremia  Sputum culture with MSSA (likely PNA as initial source)  BCx also with Proteus (Cefazolin susceptible) - ?Urinary Source    Now with rising leukocytosis which may be secondary to previous corticosteroids or secondary to uncontrolled/persistent MSSA infection   Will need line replacement as patient was actively bacteremia at time of currently line placement  Would pursue CT C/A/P when able to exclude abscess / uncontrolled focus for bacteremia    Patient also with Stenotrophomonas in sputum; given tenuous respiratory status and rising WBC would cover for this etiology: would add Levofloxacin to antibiotic regimen.     RECOMMENDATIONS:    #Positive Sputum Culture (Stenotrophomonas)  --Recommend Levofloxacin 750 IV x1 followed by 500 mg IV Q24H (while on CRRT)  --Follow up on susceptibilities of Stenotrophomonas    #MSSA bacteremia, Leukocytosis (worsening)  --Continue Cefazolin 2g IV Q12H while on CRRT  --Check TTE later this week  --Recommend line change (especially if clinical instability or persistence in bacteremia)  --When able recommend CT C/A/P if able to exclude abscess / uncontrolled focus for bacteremia  --Continue Caspofungin for now; if no growth of yeast on blood cultures would discontinue in next 48H-72H  --Continue to follow CBC with diff  --Continue to follow renal function (Cr/BUN)  --Continue to follow temperature curve  --Follow up on preliminary blood cultures (repeat Q48H until cleared)    #P. mirablis bacteremia  --Continue Cefazolin 2g IV q12h    #COVID-19  - please hold further Dexamethasone (in setting of active bacteremia)  - Continue supplemental O2 and supportive care per primary team  - Continue Contact and Airborne Isolation precautions    #Antibiotic allergy  - uncertain reaction - tolerated Ceftriaxone, Cefepime and Cefazolin in this admission  - outpatient  follow-up with Allergy/Immunology    I will continue to follow. Please feel free to contact me with any further questions.    Femi Adkins M.D.  [facility] Division of Infectious Disease  8AM-5PM: Pager Number [phone]  After Hours (or if no response): Please contact the Infectious Diseases Office at [phone]     The above assessment and plan were discussed with Dr Andrew Lee

## 2020-12-02 NOTE — PROGRESS NOTE ADULT - ATTENDING COMMENTS
Remains intubated, sedated on CRRT  1.  ARF--renal replacement rx requiring and optimizing solutions.  Clearances excellent  2.  Respiratory failure, acute--volume optimization to decrease FIO2 further  3.  Hypotension--pressor requirement and may be adjusted if needs more aggressive UF if FIO2 increases

## 2020-12-02 NOTE — PROGRESS NOTE ADULT - SUBJECTIVE AND OBJECTIVE BOX
[facility] DIVISION OF KIDNEY DISEASES AND HYPERTENSION -- FOLLOW UP NOTE  --------------------------------------------------------------------------------  Shashank Murillo   Nephrology Fellow  Pager NS: [phone]/ [facility]: [phone]  (After 5 pm or on weekends please page the on-call fellow)      Patient is a 60y old  Female who presents with a chief complaint of Acute hypoxemic respiratory failure 2/2 covid19 (02 Dec 2020 06:35)      24 hour events/subjective: Patient seen and examined at the bedside. Vital signs, labs, medications reviewed. Pt remains on CRRT, tolerating well. Was getting UF of -75cc/hr. Now being kept net even. No issues with clotting. Off of IV pressors. Vent: fio2 55%, PEEP 16        PAST HISTORY  --------------------------------------------------------------------------------  No significant changes to PMH, PSH, FHx, SHx, unless otherwise noted    ALLERGIES & MEDICATIONS  --------------------------------------------------------------------------------  Allergies    Kiwi (Unknown)  latex (Anaphylaxis)  latex (Unknown)  penicillin (Other)  penicillin (Unknown)  perfume  hives (Other)  potassium acetate (Other)  soap additives hives (Other)    Intolerances      Standing Inpatient Medications  ALBUTerol    90 MICROgram(s) HFA Inhaler 4 Puff(s) Inhalation every 6 hours  BACItracin   Ointment 1 Application(s) Topical two times a day  caspofungin IVPB      caspofungin IVPB 50 milliGRAM(s) IV Intermittent every 24 hours  ceFAZolin   IVPB 2000 milliGRAM(s) IV Intermittent every 12 hours  chlorhexidine 0.12% Liquid 15 milliLiter(s) Oral Mucosa every 12 hours  chlorhexidine 4% Liquid 1 Application(s) Topical <User Schedule>  chlorhexidine 4% Liquid 1 Application(s) Topical <User Schedule>  CRRT Treatment    <Continuous>  dextrose 40% Gel 15 Gram(s) Oral once  dextrose 5%. 1000 milliLiter(s) IV Continuous <Continuous>  dextrose 5%. 1000 milliLiter(s) IV Continuous <Continuous>  dextrose 50% Injectable 25 Gram(s) IV Push once  dextrose 50% Injectable 12.5 Gram(s) IV Push once  dextrose 50% Injectable 25 Gram(s) IV Push once  glucagon  Injectable 1 milliGRAM(s) IntraMuscular once  heparin  Infusion 1900 Unit(s)/Hr IV Continuous <Continuous>  insulin lispro (ADMELOG) corrective regimen sliding scale   SubCutaneous every 6 hours  insulin NPH human recombinant 10 Unit(s) SubCutaneous every 6 hours  ipratropium 17 MICROgram(s) HFA Inhaler 2 Puff(s) Inhalation every 6 hours  ketamine Infusion. 0.25 mG/kG/Hr IV Continuous <Continuous>  metoclopramide Injectable 10 milliGRAM(s) IV Push every 6 hours  midazolam Infusion 0.08 mG/kG/Hr IV Continuous <Continuous>  midodrine 10 milliGRAM(s) Oral every 8 hours  norepinephrine Infusion 0.05 MICROgram(s)/kG/Min IV Continuous <Continuous>  nystatin Powder 1 Application(s) Topical two times a day  pantoprazole  Injectable 40 milliGRAM(s) IV Push daily  petrolatum Ophthalmic Ointment 1 Application(s) Both EYES two times a day  polyethylene glycol 3350 17 Gram(s) Oral every 12 hours  PrismaSATE Dialysate BGK 4 / 2.5 5000 milliLiter(s) CRRT <Continuous>  PrismaSOL Filtration BGK 0 / 2.5 5000 milliLiter(s) CRRT <Continuous>  PrismaSOL Filtration BGK 4 / 2.5 5000 milliLiter(s) CRRT <Continuous>    PRN Inpatient Medications  acetaminophen    Suspension .. 650 milliGRAM(s) Oral every 6 hours PRN  sodium chloride 0.9% lock flush 10 milliLiter(s) IV Push every 1 hour PRN      REVIEW OF SYSTEMS  --------------------------------------------------------------------------------  Unable to assess    >>> <<<    VITALS/PHYSICAL EXAM  --------------------------------------------------------------------------------  T(C): 37.2 (12-02-20 @ 08:00), Max: 37.9 (12-01-20 @ 11:00)  HR: 98 (12-02-20 @ 10:00) (88 - 108)  BP: --  RR: 35 (12-02-20 @ 10:00) (30 - 35)  SpO2: 90% (12-02-20 @ 10:00) (89% - 98%)  Wt(kg): --        12-01-20 @ 07:01  -  12-02-20 @ 07:00  --------------------------------------------------------  IN: 5538.7 mL / OUT: 192 mL / NET: 5346.7 mL    12-02-20 @ 07:01  -  12-02-20 @ 10:57  --------------------------------------------------------  IN: 217.4 mL / OUT: 314 mL / NET: -96.6 mL      Physical Exam deferred 2/2 COVID19 and PPE preservations      LABS/STUDIES  --------------------------------------------------------------------------------              9.8    46.80 >-----------<  200      [12-02-20 @ 00:17]              30.9     133  |  99  |  22  ----------------------------<  167      [12-02-20 @ 06:08]  4.5   |  20  |  1.09        Ca     8.2     [12-02-20 @ 06:08]      Mg     2.6     [12-02-20 @ 06:08]      Phos  3.2     [12-02-20 @ 06:08]    TPro  6.7  /  Alb  2.9  /  TBili  1.9  /  DBili  x   /  AST  23  /  ALT  16  /  AlkPhos  65  [12-02-20 @ 00:17]    PT/INR: PT 11.7 , INR 0.97       [12-02-20 @ 00:17]  PTT: 72.2       [12-02-20 @ 00:17]          [12-02-20 @ 00:17]    Creatinine Trend:  SCr 1.09 [12-02 @ 06:08]  SCr 1.15 [12-02 @ 00:17]  SCr 1.18 [12-01 @ 16:30]  SCr 1.22 [12-01 @ 11:22]  SCr 1.14 [12-01 @ 06:13]    Urinalysis - [11-26-20 @ 23:57]      Color Yellow / Appearance Slightly Turbid / SG 1.021 / pH 5.5      Gluc Negative / Ketone Negative  / Bili Negative / Urobili Negative       Blood Negative / Protein 30 mg/dL / Leuk Est Moderate / Nitrite Negative      RBC 3 / WBC 8 / Hyaline 4 / Gran  / Sq Epi  / Non Sq Epi 2 / Bacteria Occasional    Urine Sodium <35      [11-26-20 @ 23:56]  Urine Osmolality 353      [11-26-20 @ 23:57]    Ferritin 1309      [12-02-20 @ 03:52]  HbA1c 6.6      [04-16-16 @ 06:00]  Lipid: chol --, , HDL --, LDL --      [11-30-20 @ 05:11]    HCV 0.10, Nonreact      [11-19-20 @ 09:32]

## 2020-12-02 NOTE — CHART NOTE - NSCHARTNOTEFT_GEN_A_CORE
Call received from Dr. Adkins re: + sputum culture with Stenotrophomonas. Dr. Adkins recommends covering with Levaquin, 750mg IV stat x1 followed by 500mg IV every 24hrs. Discussed with Dr. Lee. Orders placed as recommended for 7 days. qTC checked, 460.

## 2020-12-02 NOTE — PROGRESS NOTE ADULT - SUBJECTIVE AND OBJECTIVE BOX
MIGUEL A AGUILA  MRN-[id number]  Patient is a 60y old  Female who presents with a chief complaint of Acute hypoxemic respiratory failure 2/2 covid19 (01 Dec 2020 17:16)    HPI: 60F w/pmhx AARON, HTN, 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 Patient reports that she also had a general feeling of "uneasiness" that she could not explain for the last week. No chest pain. No pleuritic pain. No leg swelling or pain. Denies myalgias. Denies sick contacts. Reports that her  and son live with her and they have tested so far negative for covid and are asymptomatic, patient is not sure where was exposed. Reports trying her symbicort this week for shortness of breath without much relief. Patient yesterday began to have fevers with highest fever at 104F which alarmed her and for that reason she came to the hospital for help.  (2020 05:46)      REVIEW OF SYSTEMS:  - Unable to obtain, intubated/sedated     ICU Vital Signs Last 24 Hrs  T(C): 37.6 (02 Dec 2020 03:00), Max: 37.9 (01 Dec 2020 11:00)  T(F): 99.7 (02 Dec 2020 03:00), Max: 100.2 (01 Dec 2020 11:00)  HR: 102 (02 Dec 2020 05:34) (88 - 108)  ABP: 126/54 (02 Dec 2020 05:00) (95/46 - 166/58)  ABP(mean): 72 (02 Dec 2020 05:00) (37 - 98)  RR: 34 (02 Dec 2020 05:00) (30 - 35)  SpO2: 93% (02 Dec 2020 05:34) (89% - 98%)    Mode: AC/ CMV (Assist Control/ Continuous Mandatory Ventilation), RR (machine): 30, FiO2: 55, PEEP: 16, ITime: 0.75      I&O's Summary    2020 07:01  -  01 Dec 2020 07:00  --------------------------------------------------------  IN: 1733.4 mL / OUT: 3246 mL / NET: -1512.6 mL    01 Dec 2020 07:01  -  02 Dec 2020 06:36  --------------------------------------------------------  IN: 4823.5 mL / OUT: 0 mL / NET: 4823.5 mL      CAPILLARY BLOOD GLUCOSE  POCT Blood Glucose.: 170 mg/dL (02 Dec 2020 05:59)      PHYSICAL EXAM:  General: Critically ill, intubated  Neurology: Paralyzed, sedated RASS -4  Respiratory: Diffuse rhonchi, no wheezing   CV: RRR, S1S2, no murmurs, rubs or gallops  Abdominal: Soft, NT, ND, normoactive BS  Extremities: Trace edema, + peripheral pulses  : Rivera intact/patent   ============================I/O===========================   I&O's Detail    2020 07:01  -  01 Dec 2020 07:00  --------------------------------------------------------  IN:    Cisatracurium: 211.9 mL    Heparin: 509 mL    IV PiggyBack: 150 mL    Ketamine: 115.6 mL    Midazolam: 361.9 mL    Midazolam: 13.6 mL    Norepinephrine: 228.4 mL    Propofol: 143 mL  Total IN: 1733.4 mL    OUT:    Indwelling Catheter - Urethral (mL): 260 mL    Nasogastric/Oral tube (mL): 200 mL    Nepro: 0 mL    Other (mL): 2786 mL  Total OUT: 3246 mL    Total NET: -1512.6 mL      01 Dec 2020 07:01  -  02 Dec 2020 06:36  --------------------------------------------------------  IN:    Heparin: 440 mL    IV PiggyBack: 50 mL    Ketamine: 408 mL    Midazolam: 140.9 mL    Midazolam: 60.6 mL    Nepro: 160 mL    Norepinephrine: 289 mL    Other (mL): 3275 mL  Total IN: 4823.5 mL    OUT:    Indwelling Catheter - Urethral (mL): 0 mL    Nasogastric/Oral tube (mL): 0 mL  Total OUT: 0 mL    Total NET: 4823.5 mL  ============================ LABS =========================                        9.8    46.80 )-----------( 200      ( 02 Dec 2020 00:17 )             30.9     12-    133<L>  |  98  |  23  ----------------------------<  168<H>  4.7   |  20<L>  |  1.15    Ca    8.6      02 Dec 2020 00:17  Phos  3.3     12-  Mg     2.7     12-    TPro  6.7  /  Alb  2.9<L>  /  TBili  1.9<H>  /  DBili  x   /  AST  23  /  ALT  16  /  AlkPhos  65  12-02    LIVER FUNCTIONS - ( 02 Dec 2020 00:17 )  Alb: 2.9 g/dL / Pro: 6.7 g/dL / ALK PHOS: 65 U/L / ALT: 16 U/L / AST: 23 U/L / GGT: x           PT/INR - ( 02 Dec 2020 00:17 )   PT: 11.7 sec;   INR: 0.97 ratio    PTT - ( 02 Dec 2020 00:17 )  PTT:72.2 sec  ABG - ( 02 Dec 2020 06:03 )  pH, Arterial: 7.31  pH, Blood: x     /  pCO2: 52    /  pO2: 86    / HCO3: 25    / Base Excess: -1.1  /  SaO2: 95        Blood Gas Arterial, Lactate: 1.5 mmol/L (20 @ 06:03)  Blood Gas Arterial, Lactate: 1.7 mmol/L (20 @ 00:10)  Blood Gas Arterial, Lactate: 2.0 mmol/L (20 @ 12:56)  Blood Gas Arterial, Lactate: 1.8 mmol/L (20 @ 11:10)  Blood Gas Arterial, Lactate: 1.5 mmol/L (20 @ 00:24)  Blood Gas Arterial, Lactate: 2.2 mmol/L (20 @ 13:37)  Lactate, Blood: 2.0 mmol/L (20 @ 05:11)  Blood Gas Arterial, Lactate: 2.4 mmol/L (20 @ 05:06)  Blood Gas Arterial, Lactate: 2.2 mmol/L (20 @ 23:38)  Blood Gas Arterial, Lactate: 2.0 mmol/L (20 @ 19:28)  Blood Gas Arterial, Lactate: 1.7 mmol/L (20 @ 13:24)  Blood Gas Arterial, Lactate: 1.6 mmol/L (20 @ 11:29)    ======================Micro/Rad/Cardio=================  Telemetry: Reviewed   EKG: Reviewed  CXR: Reviewed  Culture: Reviewed   Echo: Transthoracic Echocardiogram:   Patient name: MIGUEL A AGUILA  YOB: 1960   Age: 60 (F)   MR#: [id number]  Study Date: 2020  Location: [address] [place], [place] [id number]: Osiris Shine RDCS  Study quality: Technically difficult  Referring Physician: Melody Galicia MD  Blood Pressure: 139/54 mmHg  Height: 152 cm  Weight: 136 kg  BSA: 2.2 m2  ------------------------------------------------------------------------  PROCEDURE: Transthoracic echocardiogram with 2-D, M-Mode  and complete spectral and color flow Doppler.  INDICATION: Dyspnea, unspecified (R06.00)  ------------------------------------------------------------------------  Dimensions:    Normal Values:  LA:            2.0 - 4.0 cm  Ao:            2.0 - 3.8 cm  SEPTUM: 1.2    0.6 - 1.2 cm  PWT:    1.1    0.6 - 1.1 cm  LVIDd:  4.2    3.0 - 5.6 cm  LVIDs:  2.5    1.8 - 4.0 cm  Derived variables:  LVMI: 76 g/m2  RWT: 0.52  EF (Visual Estimate): 75 %  ------------------------------------------------------------------------  Observations:  Mitral Valve: Normal mitral valve.  Aortic Valve/Aorta: Normal appearing aoritc valve leaflets.  Normal aortic root.  Left Atrium: Normal left atrium.  Left Ventricle: Suboptimal endocardial resolution.  Normal left ventricular size. Mild concentric left  ventricular hypertrophy.  Normal left ventricular systolic function. Probably no  segmental wall motion abnormalities.  Right Heart: The right ventricle is not well visualized.  Hpwever, the normal ejection period (about  300 msec), as  measured via PW-Doppler through thepulmonic valve,  suggests normal RV systolic function.  Pericardium/Pleura: Epicardial fat pad noted.  Hemodynamic: No evidence of pulmoanry hypertension.  ------------------------------------------------------------------------  Conclusions:  Suboptimal endocardial resolution.  Normal left ventricular systolic function. Probably no  segmental wall motion abnormalities.      PAST MEDICAL & SURGICAL HISTORY:  Pneumomediastinum    Umbilical hernia  Reduciable    Osteoarthritis of both knees, unspecified osteoarthritis type    AARON (Obstructive Sleep Apnea)  category II pt uses c/pap pt to bering to hospital/  OR booking notified    Pneumonia      GERD (Gastroesophageal Reflux Disease)    Asthma  diagnosed   on adbvair denies attacks    DVT (Deep Venous Thrombosis)  left leg 6 m s/p knee replacement in     HTN (Hypertension)    H/O ileostomy  Ileostomy Revesal     S/P LEEP  2000    S/P Exploratory Laparotomy    peritonitis  s/p right knee replacement/ colon resection and ileostomy    S/P Colonoscopy      S/P Endoscopy   gerd    S/P  Section      History of Tubal Ligation  s/p c/section     S/P Knee Surgery  2010 MIGUEL A AGUILA  MRN-[id number]  Patient is a 60y old  Female who presents with a chief complaint of Acute hypoxemic respiratory failure 2/2 covid19 (01 Dec 2020 17:16)    HPI: 60F w/pmhx AARON, HTN, 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 Patient reports that she also had a general feeling of "uneasiness" that she could not explain for the last week. No chest pain. No pleuritic pain. No leg swelling or pain. Denies myalgias. Denies sick contacts. Reports that her  and son live with her and they have tested so far negative for covid and are asymptomatic, patient is not sure where was exposed. Reports trying her symbicort this week for shortness of breath without much relief. Patient yesterday began to have fevers with highest fever at 104F which alarmed her and for that reason she came to the hospital for help.  (2020 05:46)      REVIEW OF SYSTEMS:  - Unable to obtain, intubated/sedated     ICU Vital Signs Last 24 Hrs  T(C): 37.6 (02 Dec 2020 03:00), Max: 37.9 (01 Dec 2020 11:00)  T(F): 99.7 (02 Dec 2020 03:00), Max: 100.2 (01 Dec 2020 11:00)  HR: 102 (02 Dec 2020 05:34) (88 - 108)  ABP: 126/54 (02 Dec 2020 05:00) (95/46 - 166/58)  ABP(mean): 72 (02 Dec 2020 05:00) (37 - 98)  RR: 34 (02 Dec 2020 05:00) (30 - 35)  SpO2: 93% (02 Dec 2020 05:34) (89% - 98%)    Mode: AC/ CMV (Assist Control/ Continuous Mandatory Ventilation), RR (machine): 30, FiO2: 55, PEEP: 16, ITime: 0.75      I&O's Summary    2020 07:01  -  01 Dec 2020 07:00  --------------------------------------------------------  IN: 1733.4 mL / OUT: 3246 mL / NET: -1512.6 mL    01 Dec 2020 07:01  -  02 Dec 2020 06:36  --------------------------------------------------------  IN: 4823.5 mL / OUT: 0 mL / NET: 4823.5 mL      CAPILLARY BLOOD GLUCOSE  POCT Blood Glucose.: 170 mg/dL (02 Dec 2020 05:59)      PHYSICAL EXAM:  General: Critically ill, intubated  Neurology: Paralyzed, sedated RASS -4  Respiratory: Diffuse rhonchi, no wheezing   CV: RRR, S1S2, no murmurs, rubs or gallops  Abdominal: Soft, NT, ND, normoactive BS  Extremities: +2 pitting edema, warm, +dsital pulses  : Rivera intact/patent, hematuria w/o visible clots   ============================I/O===========================   I&O's Detail    2020 07:01  -  01 Dec 2020 07:00  --------------------------------------------------------  IN:    Cisatracurium: 211.9 mL    Heparin: 509 mL    IV PiggyBack: 150 mL    Ketamine: 115.6 mL    Midazolam: 361.9 mL    Midazolam: 13.6 mL    Norepinephrine: 228.4 mL    Propofol: 143 mL  Total IN: 1733.4 mL    OUT:    Indwelling Catheter - Urethral (mL): 260 mL    Nasogastric/Oral tube (mL): 200 mL    Nepro: 0 mL    Other (mL): 2786 mL  Total OUT: 3246 mL    Total NET: -1512.6 mL      01 Dec 2020 07:01  -  02 Dec 2020 06:36  --------------------------------------------------------  IN:    Heparin: 440 mL    IV PiggyBack: 50 mL    Ketamine: 408 mL    Midazolam: 140.9 mL    Midazolam: 60.6 mL    Nepro: 160 mL    Norepinephrine: 289 mL    Other (mL): 3275 mL  Total IN: 4823.5 mL    OUT:    Indwelling Catheter - Urethral (mL): 0 mL    Nasogastric/Oral tube (mL): 0 mL  Total OUT: 0 mL    Total NET: 4823.5 mL  ============================ LABS =========================                        9.8    46.80 )-----------( 200      ( 02 Dec 2020 00:17 )             30.9     12-02    133<L>  |  98  |  23  ----------------------------<  168<H>  4.7   |  20<L>  |  1.15    Ca    8.6      02 Dec 2020 00:17  Phos  3.3     12-  Mg     2.7     12-    TPro  6.7  /  Alb  2.9<L>  /  TBili  1.9<H>  /  DBili  x   /  AST  23  /  ALT  16  /  AlkPhos  65  12-02    LIVER FUNCTIONS - ( 02 Dec 2020 00:17 )  Alb: 2.9 g/dL / Pro: 6.7 g/dL / ALK PHOS: 65 U/L / ALT: 16 U/L / AST: 23 U/L / GGT: x           PT/INR - ( 02 Dec 2020 00:17 )   PT: 11.7 sec;   INR: 0.97 ratio    PTT - ( 02 Dec 2020 00:17 )  PTT:72.2 sec  ABG - ( 02 Dec 2020 06:03 )  pH, Arterial: 7.31  pH, Blood: x     /  pCO2: 52    /  pO2: 86    / HCO3: 25    / Base Excess: -1.1  /  SaO2: 95        Blood Gas Arterial, Lactate: 1.5 mmol/L (20 @ 06:03)  Blood Gas Arterial, Lactate: 1.7 mmol/L (20 @ 00:10)  Blood Gas Arterial, Lactate: 2.0 mmol/L (20 @ 12:56)  Blood Gas Arterial, Lactate: 1.8 mmol/L (20 @ 11:10)  Blood Gas Arterial, Lactate: 1.5 mmol/L (20 @ 00:24)  Blood Gas Arterial, Lactate: 2.2 mmol/L (20 @ 13:37)  Lactate, Blood: 2.0 mmol/L (20 @ 05:11)  Blood Gas Arterial, Lactate: 2.4 mmol/L (20 @ 05:06)  Blood Gas Arterial, Lactate: 2.2 mmol/L (20 @ 23:38)  Blood Gas Arterial, Lactate: 2.0 mmol/L (20 @ 19:28)  Blood Gas Arterial, Lactate: 1.7 mmol/L (20 @ 13:24)  Blood Gas Arterial, Lactate: 1.6 mmol/L (20 @ 11:29)    ======================Micro/Rad/Cardio=================  Telemetry: Reviewed   EKG: Reviewed  CXR: Reviewed  Culture: Reviewed   Echo: Transthoracic Echocardiogram:   Patient name: MIGUEL A AGUILA  YOB: 1960   Age: 60 (F)   MR#: [id number]  Study Date: 2020  Location: [address] [place], [place] [id number]: Osiris Shine RDCS  Study quality: Technically difficult  Referring Physician: Melody Galicia MD  Blood Pressure: 139/54 mmHg  Height: 152 cm  Weight: 136 kg  BSA: 2.2 m2  ------------------------------------------------------------------------  PROCEDURE: Transthoracic echocardiogram with 2-D, M-Mode  and complete spectral and color flow Doppler.  INDICATION: Dyspnea, unspecified (R06.00)  ------------------------------------------------------------------------  Dimensions:    Normal Values:  LA:            2.0 - 4.0 cm  Ao:            2.0 - 3.8 cm  SEPTUM: 1.2    0.6 - 1.2 cm  PWT:    1.1    0.6 - 1.1 cm  LVIDd:  4.2    3.0 - 5.6 cm  LVIDs:  2.5    1.8 - 4.0 cm  Derived variables:  LVMI: 76 g/m2  RWT: 0.52  EF (Visual Estimate): 75 %  ------------------------------------------------------------------------  Observations:  Mitral Valve: Normal mitral valve.  Aortic Valve/Aorta: Normal appearing aoritc valve leaflets.  Normal aortic root.  Left Atrium: Normal left atrium.  Left Ventricle: Suboptimal endocardial resolution.  Normal left ventricular size. Mild concentric left  ventricular hypertrophy.  Normal left ventricular systolic function. Probably no  segmental wall motion abnormalities.  Right Heart: The right ventricle is not well visualized.  Hpwever, the normal ejection period (about  300 msec), as  measured via PW-Doppler through thepulmonic valve,  suggests normal RV systolic function.  Pericardium/Pleura: Epicardial fat pad noted.  Hemodynamic: No evidence of pulmoanry hypertension.  ------------------------------------------------------------------------  Conclusions:  Suboptimal endocardial resolution.  Normal left ventricular systolic function. Probably no  segmental wall motion abnormalities.      PAST MEDICAL & SURGICAL HISTORY:  Pneumomediastinum    Umbilical hernia  Reduciable    Osteoarthritis of both knees, unspecified osteoarthritis type    AARON (Obstructive Sleep Apnea)  category II pt uses c/pap pt to bering to hospital/  OR booking notified    Pneumonia      GERD (Gastroesophageal Reflux Disease)    Asthma  diagnosed   on adbvair denies attacks    DVT (Deep Venous Thrombosis)  left leg 6 m s/p knee replacement in     HTN (Hypertension)    H/O ileostomy  Ileostomy Revesal     S/P LEEP      S/P Exploratory Laparotomy    peritonitis  s/p right knee replacement/ colon resection and ileostomy    S/P Colonoscopy      S/P Endoscopy   gerd    S/P  Section      History of Tubal Ligation  s/p c/section     S/P Knee Surgery  2010

## 2020-12-02 NOTE — PROGRESS NOTE ADULT - ASSESSMENT
A/P: 60F Hx AARON, morbid obesity, HTN, DVT/PE p/w acute hypoxic respiratory failure in setting of ARDS/Covid-19 PNA s/p intubation.  Course c/b MSSA septic shock requiring pressors, ATN on CRRT.    Neuro  - Off paralytic, sedated on Ketamine 1.5 and Versed 0.15  - Wean Versed, if agitated can increase Ketamine   - Neuro checks per protocol     Pulm  #Hypoxic respiratory failure in setting of ARDS/Covid-19 PNA  - Intubated PC: PIP 38, RR 30, PEEP 16, Fi02 70% AB.32/46/81/24 p/f: 115  - Wean Fio2 to 65%, vent management per ABGs, pulm toileting     CV  #Hypotension likely in setting of vasodilation   - Remains on Levophed, maintain MAP >65, wean as tolerates     GI  - Tolerating trickle TF: Nephro - will advance to goal today   - Avoid checking residuals unless it clinically warrants   - c/w bowel regimen, GI ppx: PPI    Renal  #ATN requiring CRRT ()  - c/w CVV at -100ml/hr, strict I/Os, keep negative   - Renally dose meds, avoid nephrotoxins     Heme  #Hypercoagulable in setting of Covid-19, Hx DVT/PE  - Off NOAC, c/w systemic Heparin, PTT therapeutic  - Chest CTA (): no PE, +GGO b/l, check VA Duplex BLE    Endo  #T2DM, A1c 7.5  - -207, c/w NPH 10u q6h, ISS moderate     ID  #Covid-19 PNA  - S/P Remdesivir and Dexamethasone course   - Inflammatory markers q72h     #MSSA Bacteremia   - BCx MSSA/P. mirablis bacteremia, Sputum Cx: MSSA  - S/P Meropenem (-), Ceftriaxone (-)  - c/w Cefazolin 2mg IV QD - F/U BCx sent this AM  - Trend CBC w/ diff, lactate, procal, temp curve   - TTE later this week r/o endocarditis   - Shiley changed yesterday   - Remains off steriods   ======================= DISPOSITION  =====================  [X ] Critical   [ ] Guarded    [ ] Stable    [X ] Maintain in ICU  [ ] Downgrade to Medicine   [ ] Discharge Home      Christine Nolan [facility]  ICU [phone] A/P: 60F Hx AARON, morbid obesity, HTN, DVT/PE p/w acute hypoxic respiratory failure in setting of ARDS/Covid-19 PNA s/p intubation.  Course c/b MSSA septic shock requiring pressors, ATN on CRRT.    Neuro  - Off paralytic, sedated on Ketamine 1.5 and Versed 004  - Wean Versed, if agitated can increase Ketamine   - Neuro checks per protocol, sedation vacation      Pulm  #Hypoxic respiratory failure in setting of ARDS/Covid-19 PNA  - Intubated PC: PIP 38, RR 30, PEEP 16, Fi02 50% AB.31/52/86/25 p/f: 156  - Wean Fio2, gradual wean of PEEP, vent management per ABGs, pulm toileting     CV  #Hypotension likely in setting of vasodilation vs. sedation   - Remains on Levophed, maintain MAP >65, wean as tolerates     GI  - Tolerating trickle TF: Nephro - will advance to goal today   - Avoid checking residuals unless it clinically warrants   - c/w bowel regimen, GI ppx: PPI    Renal  #ATN requiring CRRT ()  - c/w CVV goal -100ml/hr, strict I/Os, keep negative   - Renally dose meds, avoid nephrotoxins     Heme  #Hypercoagulable in setting of Covid-19, Hx DVT/PE  - Off NOAC, c/w systemic Heparin, PTT therapeutic  - Chest CTA (): no PE, +GGO b/l, check VA Duplex BLE    Endo  #T2DM, A1c 7.5  - -170, c/w NPH 10u q6h, ISS moderate     ID  #Covid-19 PNA  - S/P Remdesivir and Dexamethasone course   - Inflammatory markers q72h     #MSSA Bacteremia   - BCx MSSA/P. mirablis bacteremia, Sputum Cx: MSSA  - S/P Meropenem (-), Ceftriaxone (-)  - c/w Cefazolin 2mg IV QD - F/U BCx sent this AM  - Caspofungin added, F/U fungel work up sent   - Trend CBC w/ diff, lactate, procal, temp curve   - TTE later this week r/o endocarditis   - Shiley changed ()  - Remains off steriods   ======================= DISPOSITION  =====================  [X ] Critical   [ ] Guarded    [ ] Stable    [X ] Maintain in ICU  [ ] Downgrade to Medicine   [ ] Discharge Home      Christine Nolan [facility]  ICU [phone] A/P: 60F Hx AARON, morbid obesity, HTN, DVT/PE p/w acute hypoxic respiratory failure in setting of ARDS/Covid-19 PNA s/p intubation.  Course c/b MSSA septic shock requiring pressors, ATN on CRRT.    Neuro  - Off paralytic, sedated on Ketamine 1.5 and Versed 004  - Wean Versed, if agitated can increase Ketamine   - Neuro checks per protocol, sedation vacation      Pulm  #Hypoxic respiratory failure in setting of ARDS/Covid-19 PNA  - Intubated PC: PIP 38, RR 30, PEEP 16, Fi02 50% AB.31/52/86/25 p/f: 156  - Wean Fio2, gradual wean of PEEP, vent management per ABGs, pulm toileting     CV  #Hypotension likely in setting of vasodilation vs. sedation   - Remains on Levophed, maintain MAP >65, wean as tolerates  - Increase Midodrine to 20 TID     GI  - Tolerating trickle TF: Nephro - will advance to goal today   - Avoid checking residuals unless it clinically warrants   - c/w bowel regimen, GI ppx: PPI    Renal  #ATN requiring CRRT ()  - c/w CVV goal -100ml/hr, strict I/Os, keep negative   - Renally dose meds, avoid nephrotoxins     Heme  #Hypercoagulable in setting of Covid-19, Hx DVT/PE  - Off NOAC, c/w systemic Heparin, PTT therapeutic  - Chest CTA (): no PE, +GGO b/l, check VA Duplex BLE    Endo  #T2DM, A1c 7.5  - -170, c/w NPH 10u q6h, ISS moderate     ID  #Covid-19 PNA  - S/P Remdesivir and Dexamethasone course   - Inflammatory markers q72h     #MSSA Bacteremia   - BCx MSSA/P. mirablis bacteremia, Sputum Cx: MSSA  - S/P Meropenem (-), Ceftriaxone (-)  - c/w Cefazolin 2mg IV QD - F/U BCx sent this AM  - Caspofungin added, F/U fungel work up sent   - Trend CBC w/ diff, lactate, procal, temp curve   - TTE later this week r/o endocarditis   - Shiley changed ()  - Remains off steriods   ======================= DISPOSITION  =====================  [X ] Critical   [ ] Guarded    [ ] Stable    [X ] Maintain in ICU  [ ] Downgrade to Medicine   [ ] Discharge Home      Christine Nolan [facility]  ICU [phone] A/P: 60F Hx AARON, morbid obesity, HTN, DVT/PE p/w acute hypoxic respiratory failure in setting of ARDS/Covid-19 PNA s/p intubation.  Course c/b MSSA septic shock requiring pressors, ATN on CRRT.    Neuro  - Off paralytic, sedated on Ketamine 1.5 and Versed 004  - Wean Versed, if agitated can increase Ketamine   - Neuro checks per protocol, sedation vacation      Pulm  #Hypoxic respiratory failure in setting of ARDS/Covid-19 PNA  - Intubated PC: PIP 38, RR 30, PEEP 16, Fi02 50% AB.31/52/86/25 p/f: 156  - Wean Fio2, gradual wean of PEEP, vent management per ABGs, pulm toileting     CV  #Hypotension likely in setting of vasodilation vs. sedation   - Remains on Levophed, maintain MAP >65, wean as tolerates  - Increase Midodrine to 20 TID     GI  - Tolerating trickle TF: Nephro - will advance to goal today   - Avoid checking residuals unless it clinically warrants   - c/w bowel regimen, GI ppx: PPI    Renal  #ATN requiring CRRT ()  - c/w CVV goal -100ml/hr, strict I/Os, keep negative   - Renally dose meds, avoid nephrotoxins   - Bumex challenge per Renal     Heme  #Hypercoagulable in setting of Covid-19, Hx DVT/PE  - Off NOAC, c/w systemic Heparin, PTT therapeutic  - Chest CTA (): no PE, +GGO b/l, check VA Duplex BLE    Endo  #T2DM, A1c 7.5  - -170, c/w NPH 10u q6h, ISS moderate     ID  #Covid-19 PNA  - S/P Remdesivir and Dexamethasone course   - Inflammatory markers q72h     #MSSA Bacteremia   - BCx MSSA/P. mirablis bacteremia, Sputum Cx: MSSA  - S/P Meropenem (-), Ceftriaxone (-)  - c/w Cefazolin 2mg IV QD - F/U BCx sent this AM  - Caspofungin added, F/U fungel work up sent    - CT Chest/Abd/Pelvic w/ IV contrast once able to lie flat  - Obtain YANG if pan CT negative to r/o endocarditis   - Trend CBC w/ diff, lactate, procal, temp curve  - Shiley changed ()  - Remains off steriods   ======================= DISPOSITION  =====================  [X ] Critical   [ ] Guarded    [ ] Stable    [X ] Maintain in ICU  [ ] Downgrade to Medicine   [ ] Discharge Home      Christine Nolan [facility]  ICU [phone]

## 2020-12-03 LAB
-  CEFTAZIDIME: SIGNIFICANT CHANGE UP
-  LEVOFLOXACIN: SIGNIFICANT CHANGE UP
-  TRIMETHOPRIM/SULFAMETHOXAZOLE: SIGNIFICANT CHANGE UP
ALBUMIN SERPL ELPH-MCNC: 2.7 G/DL — LOW (ref 3.3–5)
ALP SERPL-CCNC: 59 U/L — SIGNIFICANT CHANGE UP (ref 40–120)
ALT FLD-CCNC: 12 U/L — SIGNIFICANT CHANGE UP (ref 10–45)
ANION GAP SERPL CALC-SCNC: 12 MMOL/L — SIGNIFICANT CHANGE UP (ref 5–17)
ANION GAP SERPL CALC-SCNC: 13 MMOL/L — SIGNIFICANT CHANGE UP (ref 5–17)
ANION GAP SERPL CALC-SCNC: 14 MMOL/L — SIGNIFICANT CHANGE UP (ref 5–17)
ANION GAP SERPL CALC-SCNC: 15 MMOL/L — SIGNIFICANT CHANGE UP (ref 5–17)
APTT BLD: 80.3 SEC — HIGH (ref 27.5–35.5)
AST SERPL-CCNC: 24 U/L — SIGNIFICANT CHANGE UP (ref 10–40)
B-OH-BUTYR SERPL-SCNC: 0.7 MMOL/L — HIGH
BILIRUB SERPL-MCNC: 1.4 MG/DL — HIGH (ref 0.2–1.2)
BUN SERPL-MCNC: 15 MG/DL — SIGNIFICANT CHANGE UP (ref 7–23)
BUN SERPL-MCNC: 17 MG/DL — SIGNIFICANT CHANGE UP (ref 7–23)
BUN SERPL-MCNC: 18 MG/DL — SIGNIFICANT CHANGE UP (ref 7–23)
BUN SERPL-MCNC: 21 MG/DL — SIGNIFICANT CHANGE UP (ref 7–23)
CALCIUM SERPL-MCNC: 8.5 MG/DL — SIGNIFICANT CHANGE UP (ref 8.4–10.5)
CALCIUM SERPL-MCNC: 8.5 MG/DL — SIGNIFICANT CHANGE UP (ref 8.4–10.5)
CALCIUM SERPL-MCNC: 8.6 MG/DL — SIGNIFICANT CHANGE UP (ref 8.4–10.5)
CALCIUM SERPL-MCNC: 8.8 MG/DL — SIGNIFICANT CHANGE UP (ref 8.4–10.5)
CHLORIDE SERPL-SCNC: 100 MMOL/L — SIGNIFICANT CHANGE UP (ref 96–108)
CHLORIDE SERPL-SCNC: 97 MMOL/L — SIGNIFICANT CHANGE UP (ref 96–108)
CHLORIDE SERPL-SCNC: 98 MMOL/L — SIGNIFICANT CHANGE UP (ref 96–108)
CHLORIDE SERPL-SCNC: 99 MMOL/L — SIGNIFICANT CHANGE UP (ref 96–108)
CO2 SERPL-SCNC: 20 MMOL/L — LOW (ref 22–31)
CO2 SERPL-SCNC: 21 MMOL/L — LOW (ref 22–31)
CO2 SERPL-SCNC: 22 MMOL/L — SIGNIFICANT CHANGE UP (ref 22–31)
CO2 SERPL-SCNC: 22 MMOL/L — SIGNIFICANT CHANGE UP (ref 22–31)
CREAT SERPL-MCNC: 0.83 MG/DL — SIGNIFICANT CHANGE UP (ref 0.5–1.3)
CREAT SERPL-MCNC: 0.87 MG/DL — SIGNIFICANT CHANGE UP (ref 0.5–1.3)
CREAT SERPL-MCNC: 0.92 MG/DL — SIGNIFICANT CHANGE UP (ref 0.5–1.3)
CREAT SERPL-MCNC: 1.02 MG/DL — SIGNIFICANT CHANGE UP (ref 0.5–1.3)
CRP SERPL-MCNC: 20.99 MG/DL — HIGH (ref 0–0.4)
CULTURE RESULTS: SIGNIFICANT CHANGE UP
D DIMER BLD IA.RAPID-MCNC: 2017 NG/ML DDU — HIGH
FERRITIN SERPL-MCNC: 1257 NG/ML — HIGH (ref 15–150)
GAS PNL BLDA: SIGNIFICANT CHANGE UP
GLUCOSE BLDC GLUCOMTR-MCNC: 168 MG/DL — HIGH (ref 70–99)
GLUCOSE BLDC GLUCOMTR-MCNC: 207 MG/DL — HIGH (ref 70–99)
GLUCOSE SERPL-MCNC: 164 MG/DL — HIGH (ref 70–99)
GLUCOSE SERPL-MCNC: 187 MG/DL — HIGH (ref 70–99)
GLUCOSE SERPL-MCNC: 202 MG/DL — HIGH (ref 70–99)
GLUCOSE SERPL-MCNC: 212 MG/DL — HIGH (ref 70–99)
HCT VFR BLD CALC: 26.3 % — LOW (ref 34.5–45)
HGB BLD-MCNC: 8.3 G/DL — LOW (ref 11.5–15.5)
INR BLD: 1.03 RATIO — SIGNIFICANT CHANGE UP (ref 0.88–1.16)
LDH SERPL L TO P-CCNC: 362 U/L — HIGH (ref 50–242)
MAGNESIUM SERPL-MCNC: 2.4 MG/DL — SIGNIFICANT CHANGE UP (ref 1.6–2.6)
MAGNESIUM SERPL-MCNC: 2.6 MG/DL — SIGNIFICANT CHANGE UP (ref 1.6–2.6)
MCHC RBC-ENTMCNC: 28.2 PG — SIGNIFICANT CHANGE UP (ref 27–34)
MCHC RBC-ENTMCNC: 31.6 GM/DL — LOW (ref 32–36)
MCV RBC AUTO: 89.5 FL — SIGNIFICANT CHANGE UP (ref 80–100)
METHOD TYPE: SIGNIFICANT CHANGE UP
NRBC # BLD: 2 /100 WBCS — HIGH (ref 0–0)
ORGANISM # SPEC MICROSCOPIC CNT: SIGNIFICANT CHANGE UP
ORGANISM # SPEC MICROSCOPIC CNT: SIGNIFICANT CHANGE UP
PHOSPHATE SERPL-MCNC: 2.5 MG/DL — SIGNIFICANT CHANGE UP (ref 2.5–4.5)
PHOSPHATE SERPL-MCNC: 2.6 MG/DL — SIGNIFICANT CHANGE UP (ref 2.5–4.5)
PHOSPHATE SERPL-MCNC: 2.6 MG/DL — SIGNIFICANT CHANGE UP (ref 2.5–4.5)
PHOSPHATE SERPL-MCNC: 3.2 MG/DL — SIGNIFICANT CHANGE UP (ref 2.5–4.5)
PLATELET # BLD AUTO: 209 K/UL — SIGNIFICANT CHANGE UP (ref 150–400)
POTASSIUM SERPL-MCNC: 4.4 MMOL/L — SIGNIFICANT CHANGE UP (ref 3.5–5.3)
POTASSIUM SERPL-MCNC: 4.5 MMOL/L — SIGNIFICANT CHANGE UP (ref 3.5–5.3)
POTASSIUM SERPL-MCNC: 4.6 MMOL/L — SIGNIFICANT CHANGE UP (ref 3.5–5.3)
POTASSIUM SERPL-MCNC: 4.6 MMOL/L — SIGNIFICANT CHANGE UP (ref 3.5–5.3)
POTASSIUM SERPL-SCNC: 4.4 MMOL/L — SIGNIFICANT CHANGE UP (ref 3.5–5.3)
POTASSIUM SERPL-SCNC: 4.5 MMOL/L — SIGNIFICANT CHANGE UP (ref 3.5–5.3)
POTASSIUM SERPL-SCNC: 4.6 MMOL/L — SIGNIFICANT CHANGE UP (ref 3.5–5.3)
POTASSIUM SERPL-SCNC: 4.6 MMOL/L — SIGNIFICANT CHANGE UP (ref 3.5–5.3)
PROT SERPL-MCNC: 6.3 G/DL — SIGNIFICANT CHANGE UP (ref 6–8.3)
PROTHROM AB SERPL-ACNC: 12.3 SEC — SIGNIFICANT CHANGE UP (ref 10.6–13.6)
RBC # BLD: 2.94 M/UL — LOW (ref 3.8–5.2)
RBC # FLD: 17.2 % — HIGH (ref 10.3–14.5)
SODIUM SERPL-SCNC: 133 MMOL/L — LOW (ref 135–145)
SODIUM SERPL-SCNC: 134 MMOL/L — LOW (ref 135–145)
SPECIMEN SOURCE: SIGNIFICANT CHANGE UP
WBC # BLD: 36.69 K/UL — HIGH (ref 3.8–10.5)
WBC # FLD AUTO: 36.69 K/UL — HIGH (ref 3.8–10.5)

## 2020-12-03 PROCEDURE — 99233 SBSQ HOSP IP/OBS HIGH 50: CPT | Mod: GC

## 2020-12-03 PROCEDURE — 93010 ELECTROCARDIOGRAM REPORT: CPT

## 2020-12-03 PROCEDURE — 99291 CRITICAL CARE FIRST HOUR: CPT

## 2020-12-03 PROCEDURE — 99233 SBSQ HOSP IP/OBS HIGH 50: CPT

## 2020-12-03 RX ORDER — CISATRACURIUM BESYLATE 2 MG/ML
20 INJECTION INTRAVENOUS ONCE
Refills: 0 | Status: COMPLETED | OUTPATIENT
Start: 2020-12-03 | End: 2020-12-03

## 2020-12-03 RX ORDER — CEFAZOLIN SODIUM 1 G
2000 VIAL (EA) INJECTION EVERY 8 HOURS
Refills: 0 | Status: DISCONTINUED | OUTPATIENT
Start: 2020-12-03 | End: 2020-12-07

## 2020-12-03 RX ORDER — CISATRACURIUM BESYLATE 2 MG/ML
3 INJECTION INTRAVENOUS
Qty: 200 | Refills: 0 | Status: DISCONTINUED | OUTPATIENT
Start: 2020-12-03 | End: 2020-12-04

## 2020-12-03 RX ORDER — CISATRACURIUM BESYLATE 2 MG/ML
2 INJECTION INTRAVENOUS ONCE
Refills: 0 | Status: DISCONTINUED | OUTPATIENT
Start: 2020-12-03 | End: 2020-12-03

## 2020-12-03 RX ADMIN — ALBUTEROL 4 PUFF(S): 90 AEROSOL, METERED ORAL at 05:42

## 2020-12-03 RX ADMIN — HUMAN INSULIN 10 UNIT(S): 100 INJECTION, SUSPENSION SUBCUTANEOUS at 05:08

## 2020-12-03 RX ADMIN — ALBUTEROL 4 PUFF(S): 90 AEROSOL, METERED ORAL at 17:51

## 2020-12-03 RX ADMIN — Medication 12.8 MICROGRAM(S)/KG/MIN: at 22:00

## 2020-12-03 RX ADMIN — Medication 10 MILLIGRAM(S): at 05:03

## 2020-12-03 RX ADMIN — Medication 2 PUFF(S): at 23:49

## 2020-12-03 RX ADMIN — MIDODRINE HYDROCHLORIDE 10 MILLIGRAM(S): 2.5 TABLET ORAL at 22:02

## 2020-12-03 RX ADMIN — Medication 12.8 MICROGRAM(S)/KG/MIN: at 17:38

## 2020-12-03 RX ADMIN — CISATRACURIUM BESYLATE 20 MILLIGRAM(S): 2 INJECTION INTRAVENOUS at 11:15

## 2020-12-03 RX ADMIN — Medication 1 APPLICATION(S): at 05:03

## 2020-12-03 RX ADMIN — CHLORHEXIDINE GLUCONATE 15 MILLILITER(S): 213 SOLUTION TOPICAL at 17:38

## 2020-12-03 RX ADMIN — CISATRACURIUM BESYLATE 20 MILLIGRAM(S): 2 INJECTION INTRAVENOUS at 10:47

## 2020-12-03 RX ADMIN — NYSTATIN CREAM 1 APPLICATION(S): 100000 CREAM TOPICAL at 17:38

## 2020-12-03 RX ADMIN — MIDAZOLAM HYDROCHLORIDE 10.9 MG/KG/HR: 1 INJECTION, SOLUTION INTRAMUSCULAR; INTRAVENOUS at 22:00

## 2020-12-03 RX ADMIN — HUMAN INSULIN 10 UNIT(S): 100 INJECTION, SUSPENSION SUBCUTANEOUS at 11:42

## 2020-12-03 RX ADMIN — Medication 1: at 05:09

## 2020-12-03 RX ADMIN — Medication 2 PUFF(S): at 05:42

## 2020-12-03 RX ADMIN — CHLORHEXIDINE GLUCONATE 1 APPLICATION(S): 213 SOLUTION TOPICAL at 05:11

## 2020-12-03 RX ADMIN — ALBUTEROL 4 PUFF(S): 90 AEROSOL, METERED ORAL at 23:50

## 2020-12-03 RX ADMIN — PANTOPRAZOLE SODIUM 40 MILLIGRAM(S): 20 TABLET, DELAYED RELEASE ORAL at 11:41

## 2020-12-03 RX ADMIN — Medication 1: at 17:39

## 2020-12-03 RX ADMIN — Medication 1 APPLICATION(S): at 17:40

## 2020-12-03 RX ADMIN — Medication 1 APPLICATION(S): at 05:11

## 2020-12-03 RX ADMIN — MIDODRINE HYDROCHLORIDE 10 MILLIGRAM(S): 2.5 TABLET ORAL at 14:48

## 2020-12-03 RX ADMIN — HUMAN INSULIN 10 UNIT(S): 100 INJECTION, SUSPENSION SUBCUTANEOUS at 17:39

## 2020-12-03 RX ADMIN — Medication 100 MILLIGRAM(S): at 14:48

## 2020-12-03 RX ADMIN — Medication 2 PUFF(S): at 11:39

## 2020-12-03 RX ADMIN — NYSTATIN CREAM 1 APPLICATION(S): 100000 CREAM TOPICAL at 05:03

## 2020-12-03 RX ADMIN — CISATRACURIUM BESYLATE 24.5 MICROGRAM(S)/KG/MIN: 2 INJECTION INTRAVENOUS at 22:00

## 2020-12-03 RX ADMIN — Medication 2: at 23:45

## 2020-12-03 RX ADMIN — Medication 1 APPLICATION(S): at 17:39

## 2020-12-03 RX ADMIN — ALBUTEROL 4 PUFF(S): 90 AEROSOL, METERED ORAL at 11:38

## 2020-12-03 RX ADMIN — KETAMINE HYDROCHLORIDE 3.4 MG/KG/HR: 100 INJECTION INTRAMUSCULAR; INTRAVENOUS at 22:00

## 2020-12-03 RX ADMIN — Medication 100 MILLIGRAM(S): at 05:03

## 2020-12-03 RX ADMIN — MIDODRINE HYDROCHLORIDE 10 MILLIGRAM(S): 2.5 TABLET ORAL at 05:03

## 2020-12-03 RX ADMIN — Medication 2 PUFF(S): at 17:53

## 2020-12-03 RX ADMIN — POLYETHYLENE GLYCOL 3350 17 GRAM(S): 17 POWDER, FOR SOLUTION ORAL at 17:38

## 2020-12-03 RX ADMIN — Medication 100 MILLIGRAM(S): at 21:58

## 2020-12-03 RX ADMIN — MIDAZOLAM HYDROCHLORIDE 10.9 MG/KG/HR: 1 INJECTION, SOLUTION INTRAMUSCULAR; INTRAVENOUS at 17:38

## 2020-12-03 RX ADMIN — CASPOFUNGIN ACETATE 260 MILLIGRAM(S): 7 INJECTION, POWDER, LYOPHILIZED, FOR SOLUTION INTRAVENOUS at 21:59

## 2020-12-03 RX ADMIN — CISATRACURIUM BESYLATE 24.5 MICROGRAM(S)/KG/MIN: 2 INJECTION INTRAVENOUS at 11:41

## 2020-12-03 RX ADMIN — Medication 10 MILLIGRAM(S): at 11:42

## 2020-12-03 RX ADMIN — KETAMINE HYDROCHLORIDE 3.4 MG/KG/HR: 100 INJECTION INTRAMUSCULAR; INTRAVENOUS at 17:38

## 2020-12-03 RX ADMIN — CISATRACURIUM BESYLATE 24.5 MICROGRAM(S)/KG/MIN: 2 INJECTION INTRAVENOUS at 17:37

## 2020-12-03 RX ADMIN — CHLORHEXIDINE GLUCONATE 1 APPLICATION(S): 213 SOLUTION TOPICAL at 11:43

## 2020-12-03 RX ADMIN — CHLORHEXIDINE GLUCONATE 15 MILLILITER(S): 213 SOLUTION TOPICAL at 05:49

## 2020-12-03 RX ADMIN — HEPARIN SODIUM 22 UNIT(S)/HR: 5000 INJECTION INTRAVENOUS; SUBCUTANEOUS at 17:41

## 2020-12-03 RX ADMIN — Medication 10 MILLIGRAM(S): at 17:38

## 2020-12-03 RX ADMIN — Medication 2: at 11:43

## 2020-12-03 RX ADMIN — POLYETHYLENE GLYCOL 3350 17 GRAM(S): 17 POWDER, FOR SOLUTION ORAL at 05:04

## 2020-12-03 NOTE — PROGRESS NOTE ADULT - SUBJECTIVE AND OBJECTIVE BOX
MIGUEL A AGUILA  MRN-[id number]  Patient is a 60y old  Female who presents with a chief complaint of Acute hypoxemic respiratory failure 2/2 covid19 (02 Dec 2020 11:28)    HPI: 60F w/pmhx AARON, HTN, 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 Patient reports that she also had a general feeling of "uneasiness" that she could not explain for the last week. No chest pain. No pleuritic pain. No leg swelling or pain. Denies myalgias. Denies sick contacts. Reports that her  and son live with her and they have tested so far negative for covid and are asymptomatic, patient is not sure where was exposed. Reports trying her symbicort this week for shortness of breath without much relief. Patient yesterday began to have fevers with highest fever at 104F which alarmed her and for that reason she came to the hospital for help.  (2020 05:46)    REVIEW OF SYSTEMS:  - Unable to obtain, intubated/sedated     ICU Vital Signs Last 24 Hrs  T(C): 36.8 (03 Dec 2020 04:00), Max: 37.2 (02 Dec 2020 08:00)  T(F): 98.2 (03 Dec 2020 04:00), Max: 99 (02 Dec 2020 08:00)  HR: 104 (03 Dec 2020 06:15) (75 - 104)  ABP: 120/50 (03 Dec 2020 06:15) (108/48 - 190/70)  ABP(mean): 69 (03 Dec 2020 06:15) (63 - 102)  RR: 30 (03 Dec 2020 06:15) (30 - 38)  SpO2: 92% (03 Dec 2020 06:15) (90% - 100%)    Mode: AC/ CMV (Assist Control/ Continuous Mandatory Ventilation), RR (machine): 30, FiO2: 50, PEEP: 16, ITime: 0.75      I&O's Summary    01 Dec 2020 07:01  -  02 Dec 2020 07:00  --------------------------------------------------------  IN: 5538.7 mL / OUT: 192 mL / NET: 5346.7 mL    02 Dec 2020 07:01  -  03 Dec 2020 06:46  --------------------------------------------------------  IN: 2281.6 mL / OUT: 2943 mL / NET: -661.4 mL      CAPILLARY BLOOD GLUCOSE  POCT Blood Glucose.: 168 mg/dL (03 Dec 2020 05:08)    PHYSICAL EXAM:  General: Critically ill, intubated  Neurology: Paralyzed, sedated RASS -4  Respiratory: Diffuse rhonchi, no wheezing   CV: RRR, S1S2, no murmurs, rubs or gallops  Abdominal: Soft, NT, ND, normoactive BS  Extremities: +2 pitting edema, warm, +dsital pulses  : Rivera intact/patent, hematuria w/o visible clots   ============================I/O===========================   I&O's Detail    01 Dec 2020 07:01  -  02 Dec 2020 07:00  --------------------------------------------------------  IN:    Enteral Tube Flush: 120 mL    Heparin: 528 mL    IV PiggyBack: 350 mL    Ketamine: 489.6 mL    Midazolam: 162.5 mL    Midazolam: 60.6 mL    Nepro: 200 mL    Norepinephrine: 353 mL    Other (mL): 3275 mL  Total IN: 5538.7 mL    OUT:    Indwelling Catheter - Urethral (mL): 0 mL    Nasogastric/Oral tube (mL): 0 mL    Other (mL): 192 mL  Total OUT: 192 mL    Total NET: 5346.7 mL      02 Dec 2020 07:01  -  03 Dec 2020 06:46  --------------------------------------------------------  IN:    Enteral Tube Flush: 220 mL    Heparin: 506 mL    IV PiggyBack: 500 mL    Ketamine: 469.2 mL    Midazolam: 124.2 mL    Nepro: 230 mL    Norepinephrine: 232.2 mL  Total IN: 2281.6 mL    OUT:    Indwelling Catheter - Urethral (mL): 0 mL    Other (mL): 2943 mL  Total OUT: 2943 mL    Total NET: -661.4 mL  ============================ LABS =========================                        8.3    36.69 )-----------( 209      ( 03 Dec 2020 00:29 )             26.3     12-03    133<L>  |  100  |  21  ----------------------------<  202<H>  4.5   |  20<L>  |  1.02    Ca    8.5      03 Dec 2020 00:29  Phos  2.5     12-03  Mg     2.4     12-03    TPro  6.3  /  Alb  2.7<L>  /  TBili  1.4<H>  /  DBili  x   /  AST  24  /  ALT  12  /  AlkPhos  59  12-03    LIVER FUNCTIONS - ( 03 Dec 2020 00:29 )  Alb: 2.7 g/dL / Pro: 6.3 g/dL / ALK PHOS: 59 U/L / ALT: 12 U/L / AST: 24 U/L / GGT: x           PT/INR - ( 03 Dec 2020 00:29 )   PT: 12.3 sec;   INR: 1.03 ratio    PTT - ( 03 Dec 2020 00:29 )  PTT:80.3 sec  ABG - ( 03 Dec 2020 00:26 )  pH, Arterial: 7.39  pH, Blood: x     /  pCO2: 41    /  pO2: 100   / HCO3: 24    / Base Excess: -.2   /  SaO2: 98        Blood Gas Arterial, Lactate: 1.6 mmol/L (20 @ 00:26)  Blood Gas Arterial, Lactate: 1.5 mmol/L (20 @ 06:03)  Blood Gas Arterial, Lactate: 1.7 mmol/L (20 @ 00:10)  Blood Gas Arterial, Lactate: 2.0 mmol/L (20 @ 12:56)  Blood Gas Arterial, Lactate: 1.8 mmol/L (20 @ 11:10)  Blood Gas Arterial, Lactate: 1.5 mmol/L (20 @ 00:24)  Blood Gas Arterial, Lactate: 2.2 mmol/L (20 @ 13:37)  ======================Micro/Rad/Cardio=================  Telemetry: Reviewed   EKG: Reviewed  CXR: Reviewed  Culture: Reviewed   Echo: Transthoracic Echocardiogram:   Patient name: MIGUEL A AGUILA  YOB: 1960   Age: 60 (F)   MR#: [id number]  Study Date: 2020  Location: [address] [place], [place] [id number]: Osiris Shine RDCS  Study quality: Technically difficult  Referring Physician: Melody Galicia MD  Blood Pressure: 139/54 mmHg  Height: 152 cm  Weight: 136 kg  BSA: 2.2 m2  ------------------------------------------------------------------------  PROCEDURE: Transthoracic echocardiogram with 2-D, M-Mode  and complete spectral and color flow Doppler.  INDICATION: Dyspnea, unspecified (R06.00)  ------------------------------------------------------------------------  Dimensions:    Normal Values:  LA:            2.0 - 4.0 cm  Ao:            2.0 - 3.8 cm  SEPTUM: 1.2    0.6 - 1.2 cm  PWT:    1.1    0.6 - 1.1 cm  LVIDd:  4.2    3.0 - 5.6 cm  LVIDs:  2.5    1.8 - 4.0 cm  Derived variables:  LVMI: 76 g/m2  RWT: 0.52  EF (Visual Estimate): 75 %  ------------------------------------------------------------------------  Observations:  Mitral Valve: Normal mitral valve.  Aortic Valve/Aorta: Normal appearing aoritc valve leaflets.  Normal aortic root.  Left Atrium: Normal left atrium.  Left Ventricle: Suboptimal endocardial resolution.  Normal left ventricular size. Mild concentric left  ventricular hypertrophy.  Normal left ventricular systolic function. Probably no  segmental wall motion abnormalities.  Right Heart: The right ventricle is not well visualized.  Hpwever, the normal ejection period (about  300 msec), as  measured via PW-Doppler through thepulmonic valve,  suggests normal RV systolic function.  Pericardium/Pleura: Epicardial fat pad noted.  Hemodynamic: No evidence of pulmoanry hypertension.  ------------------------------------------------------------------------  Conclusions:  Suboptimal endocardial resolution.  Normal left ventricular systolic function. Probably no  segmental wall motion abnormalities.      PAST MEDICAL & SURGICAL HISTORY:  Pneumomediastinum    Umbilical hernia  Reduciable    Osteoarthritis of both knees, unspecified osteoarthritis type    AARON (Obstructive Sleep Apnea)  category II pt uses c/pap pt to bering to hospital/  OR booking notified    Pneumonia      GERD (Gastroesophageal Reflux Disease)    Asthma  diagnosed   on adbvair denies attacks    DVT (Deep Venous Thrombosis)  left leg 6 m s/p knee replacement in     HTN (Hypertension)    H/O ileostomy  Ileostomy Revesal     S/P LEEP  2000    S/P Exploratory Laparotomy    peritonitis  s/p right knee replacement/ colon resection and ileostomy    S/P Colonoscopy      S/P Endoscopy   gerd    S/P  Section      History of Tubal Ligation  s/p c/section     S/P Knee Surgery  2010 MIGUEL A AGUILA  MRN-[id number]  Patient is a 60y old  Female who presents with a chief complaint of Acute hypoxemic respiratory failure 2/2 covid19 (02 Dec 2020 11:28)    HPI: 60F w/pmhx AARON, HTN, 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 Patient reports that she also had a general feeling of "uneasiness" that she could not explain for the last week. No chest pain. No pleuritic pain. No leg swelling or pain. Denies myalgias. Denies sick contacts. Reports that her  and son live with her and they have tested so far negative for covid and are asymptomatic, patient is not sure where was exposed. Reports trying her symbicort this week for shortness of breath without much relief. Patient yesterday began to have fevers with highest fever at 104F which alarmed her and for that reason she came to the hospital for help.  (2020 05:46)    REVIEW OF SYSTEMS:  - Unable to obtain, intubated/sedated     ICU Vital Signs Last 24 Hrs  T(C): 36.8 (03 Dec 2020 04:00), Max: 37.2 (02 Dec 2020 08:00)  T(F): 98.2 (03 Dec 2020 04:00), Max: 99 (02 Dec 2020 08:00)  HR: 104 (03 Dec 2020 06:15) (75 - 104)  ABP: 120/50 (03 Dec 2020 06:15) (108/48 - 190/70)  ABP(mean): 69 (03 Dec 2020 06:15) (63 - 102)  RR: 30 (03 Dec 2020 06:15) (30 - 38)  SpO2: 92% (03 Dec 2020 06:15) (90% - 100%)    Mode: AC/ CMV (Assist Control/ Continuous Mandatory Ventilation), RR (machine): 30, FiO2: 50, PEEP: 16, ITime: 0.75      I&O's Summary    01 Dec 2020 07:01  -  02 Dec 2020 07:00  --------------------------------------------------------  IN: 5538.7 mL / OUT: 192 mL / NET: 5346.7 mL    02 Dec 2020 07:01  -  03 Dec 2020 06:46  --------------------------------------------------------  IN: 2281.6 mL / OUT: 2943 mL / NET: -661.4 mL      CAPILLARY BLOOD GLUCOSE  POCT Blood Glucose.: 168 mg/dL (03 Dec 2020 05:08)    PHYSICAL EXAM:  General: Critically ill, intubated  Neurology: Sedated RASS -4  Respiratory: Diffuse rhonchi, no wheezing   CV: RRR, S1S2, no murmurs, rubs or gallops  Abdominal: Soft, NT, ND, normoactive BS  Extremities: +2 pitting edema, warm, +dsital pulses  : Rivera intact/patent, hematuria w/o visible clots   ============================I/O===========================   I&O's Detail    01 Dec 2020 07:01  -  02 Dec 2020 07:00  --------------------------------------------------------  IN:    Enteral Tube Flush: 120 mL    Heparin: 528 mL    IV PiggyBack: 350 mL    Ketamine: 489.6 mL    Midazolam: 162.5 mL    Midazolam: 60.6 mL    Nepro: 200 mL    Norepinephrine: 353 mL    Other (mL): 3275 mL  Total IN: 5538.7 mL    OUT:    Indwelling Catheter - Urethral (mL): 0 mL    Nasogastric/Oral tube (mL): 0 mL    Other (mL): 192 mL  Total OUT: 192 mL    Total NET: 5346.7 mL      02 Dec 2020 07:01  -  03 Dec 2020 06:46  --------------------------------------------------------  IN:    Enteral Tube Flush: 220 mL    Heparin: 506 mL    IV PiggyBack: 500 mL    Ketamine: 469.2 mL    Midazolam: 124.2 mL    Nepro: 230 mL    Norepinephrine: 232.2 mL  Total IN: 2281.6 mL    OUT:    Indwelling Catheter - Urethral (mL): 0 mL    Other (mL): 2943 mL  Total OUT: 2943 mL    Total NET: -661.4 mL  ============================ LABS =========================                        8.3    36.69 )-----------( 209      ( 03 Dec 2020 00:29 )             26.3     12-03    133<L>  |  100  |  21  ----------------------------<  202<H>  4.5   |  20<L>  |  1.02    Ca    8.5      03 Dec 2020 00:29  Phos  2.5     12-  Mg     2.4     12    TPro  6.3  /  Alb  2.7<L>  /  TBili  1.4<H>  /  DBili  x   /  AST  24  /  ALT  12  /  AlkPhos  59  12-03    LIVER FUNCTIONS - ( 03 Dec 2020 00:29 )  Alb: 2.7 g/dL / Pro: 6.3 g/dL / ALK PHOS: 59 U/L / ALT: 12 U/L / AST: 24 U/L / GGT: x           PT/INR - ( 03 Dec 2020 00:29 )   PT: 12.3 sec;   INR: 1.03 ratio    PTT - ( 03 Dec 2020 00:29 )  PTT:80.3 sec  ABG - ( 03 Dec 2020 00:26 )  pH, Arterial: 7.39  pH, Blood: x     /  pCO2: 41    /  pO2: 100   / HCO3: 24    / Base Excess: -.2   /  SaO2: 98        Blood Gas Arterial, Lactate: 1.6 mmol/L (20 @ 00:26)  Blood Gas Arterial, Lactate: 1.5 mmol/L (20 @ 06:03)  Blood Gas Arterial, Lactate: 1.7 mmol/L (20 @ 00:10)  Blood Gas Arterial, Lactate: 2.0 mmol/L (20 @ 12:56)  Blood Gas Arterial, Lactate: 1.8 mmol/L (20 @ 11:10)  Blood Gas Arterial, Lactate: 1.5 mmol/L (20 @ 00:24)  Blood Gas Arterial, Lactate: 2.2 mmol/L (20 @ 13:37)  ======================Micro/Rad/Cardio=================  Telemetry: Reviewed   EKG: Reviewed  CXR: Reviewed  Culture: Reviewed   Echo: Transthoracic Echocardiogram:   Patient name: MIGUEL A AGUILA  YOB: 1960   Age: 60 (F)   MR#: [id number]  Study Date: 2020  Location: [address] [place], [place] [id number]: Osiris Shine RDCS  Study quality: Technically difficult  Referring Physician: Melody Galicia MD  Blood Pressure: 139/54 mmHg  Height: 152 cm  Weight: 136 kg  BSA: 2.2 m2  ------------------------------------------------------------------------  PROCEDURE: Transthoracic echocardiogram with 2-D, M-Mode  and complete spectral and color flow Doppler.  INDICATION: Dyspnea, unspecified (R06.00)  ------------------------------------------------------------------------  Dimensions:    Normal Values:  LA:            2.0 - 4.0 cm  Ao:            2.0 - 3.8 cm  SEPTUM: 1.2    0.6 - 1.2 cm  PWT:    1.1    0.6 - 1.1 cm  LVIDd:  4.2    3.0 - 5.6 cm  LVIDs:  2.5    1.8 - 4.0 cm  Derived variables:  LVMI: 76 g/m2  RWT: 0.52  EF (Visual Estimate): 75 %  ------------------------------------------------------------------------  Observations:  Mitral Valve: Normal mitral valve.  Aortic Valve/Aorta: Normal appearing aoritc valve leaflets.  Normal aortic root.  Left Atrium: Normal left atrium.  Left Ventricle: Suboptimal endocardial resolution.  Normal left ventricular size. Mild concentric left  ventricular hypertrophy.  Normal left ventricular systolic function. Probably no  segmental wall motion abnormalities.  Right Heart: The right ventricle is not well visualized.  Hpwever, the normal ejection period (about  300 msec), as  measured via PW-Doppler through thepulmonic valve,  suggests normal RV systolic function.  Pericardium/Pleura: Epicardial fat pad noted.  Hemodynamic: No evidence of pulmoanry hypertension.  ------------------------------------------------------------------------  Conclusions:  Suboptimal endocardial resolution.  Normal left ventricular systolic function. Probably no  segmental wall motion abnormalities.      PAST MEDICAL & SURGICAL HISTORY:  Pneumomediastinum    Umbilical hernia  Reduciable    Osteoarthritis of both knees, unspecified osteoarthritis type    AARON (Obstructive Sleep Apnea)  category II pt uses c/pap pt to bering to hospital/  OR booking notified    Pneumonia      GERD (Gastroesophageal Reflux Disease)    Asthma  diagnosed   on adbvair denies attacks    DVT (Deep Venous Thrombosis)  left leg 6 m s/p knee replacement in     HTN (Hypertension)    H/O ileostomy  Ileostomy Revesal     S/P LEEP  2000    S/P Exploratory Laparotomy    peritonitis  s/p right knee replacement/ colon resection and ileostomy    S/P Colonoscopy      S/P Endoscopy  2011 gerd    S/P  Section      History of Tubal Ligation  s/p c/section     S/P Knee Surgery  2010

## 2020-12-03 NOTE — PROGRESS NOTE ADULT - ASSESSMENT
A/P: 60F Hx AARON, morbid obesity, HTN, DVT/PE p/w acute hypoxic respiratory failure in setting of ARDS/Covid-19 PNA s/p intubation.  Course c/b MSSA septic shock requiring pressors, ATN on CRRT.    Neuro  - Off paralytic, sedated on Ketamine 1.5 and Versed 004  - Wean Versed, if agitated can increase Ketamine   - Neuro checks per protocol, sedation vacation      Pulm  #Hypoxic respiratory failure in setting of ARDS/Covid-19 PNA  - Intubated PC: PIP 38, RR 30, PEEP 16, Fi02 50% AB.31/52/86/25 p/f: 156  - Wean Fio2, gradual wean of PEEP, vent management per ABGs, pulm toileting     CV  #Hypotension likely in setting of vasodilation vs. sedation   - Remains on Levophed, maintain MAP >65, wean as tolerates  - Increase Midodrine to 20 TID     GI  - Tolerating trickle TF: Nephro - will advance to goal today   - Avoid checking residuals unless it clinically warrants   - c/w bowel regimen, GI ppx: PPI    Renal  #ATN requiring CRRT ()  - c/w CVV goal -100ml/hr, strict I/Os, keep negative   - Renally dose meds, avoid nephrotoxins   - Bumex challenge per Renal     Heme  #Hypercoagulable in setting of Covid-19, Hx DVT/PE  - Off NOAC, c/w systemic Heparin, PTT therapeutic  - Chest CTA (): no PE, +GGO b/l, check VA Duplex BLE    Endo  #T2DM, A1c 7.5  - -170, c/w NPH 10u q6h, ISS moderate     ID  #Covid-19 PNA  - S/P Remdesivir and Dexamethasone course   - Inflammatory markers q72h     #MSSA Bacteremia   - BCx MSSA/P. mirablis bacteremia, Sputum Cx: MSSA  - S/P Meropenem (-), Ceftriaxone (-)  - c/w Cefazolin 2mg IV QD - F/U BCx sent this AM  - Caspofungin added, F/U fungel work up sent    - CT Chest/Abd/Pelvic w/ IV contrast once able to lie flat  - Obtain YANG if pan CT negative to r/o endocarditis   - Trend CBC w/ diff, lactate, procal, temp curve  - Shiley changed ()  - Remains off steriods   ======================= DISPOSITION  =====================  [X ] Critical   [ ] Guarded    [ ] Stable    [X ] Maintain in ICU  [ ] Downgrade to Medicine   [ ] Discharge Home      Christine Nolan [facility]  ICU [phone]   A/P: 60F Hx AARON, morbid obesity, HTN, DVT/PE p/w acute hypoxic respiratory failure in setting of ARDS/Covid-19 PNA s/p intubation.  Course c/b MSSA septic shock requiring pressors, ATN on CRRT.    Neuro  - Restart Nimbex given poor vent compliance, bolus now  - Sedated: Ketamine 1.5, Versed 0.04 - titrate as needed   - Neuro checks per protocol    Pulm  #Hypoxic respiratory failure in setting of ARDS/Covid-19 PNA  - Intubated PC: PIP 40, RR 30, PEEP 16, Fi02 50% AB.39/41/100/24 p/f: 200  - Paralyze and prone now - vent management per ABGs, pulm toileting   - Would consider attempting to increase CRRT removal to 200/hr     CV  #Hypotension likely in setting of vasodilation vs. sedation   - Remains on Levophed, maintain MAP >65, wean as tolerates  - Increase Midodrine to 20 TID     GI  - Tolerating trickle TF: Nephro - will advance to goal today   - Avoid checking residuals unless it clinically warrants   - c/w bowel regimen, GI ppx: PPI    Renal  #ATN requiring CRRT ()  - c/w CVV currently -100ml/hr, strict I/Os, keep negative   - Consider attempting to increase CRRT removal to -200ml/hr   - Failed Bumex 4mg IVPB challenge yesterday    - Renally dose meds, avoid nephrotoxins     Heme  #Hypercoagulable in setting of Covid-19, Hx DVT/PE  - Off NOAC, c/w systemic Heparin, PTT therapeutic  - Chest CTA (): no PE, +GGO b/l, check VA Duplex BLE    Endo  #T2DM, A1c 7.5  - -170, c/w NPH 10u q6h, ISS moderate     ID  #Covid-19 PNA  - S/P Remdesivir and Dexamethasone course   - Inflammatory markers q72h     #MSSA/P. mirablis Bacteremia   - S/P Meropenem (-), Ceftriaxone (-)  - c/w Cefazolin 2mg IV QD - BCx (): NGTD  - c/w Caspofungin for now, F/U fungel work up sent ()  - Obtain CT Chest/Abd/Pelvic w/ IV contrast once stable   - Obtain YANG if pan CT negative to r/o endocarditis   - Trend CBC w/ diff, lactate, procal, temp curve    #Sputum +Stenotrophomonas   - c/w Levaquin 500mg IV daily - F/U susceptibilities    ======================= DISPOSITION  =====================  [X ] Critical   [ ] Guarded    [ ] Stable    [X ] Maintain in ICU  [ ] Downgrade to Medicine   [ ] Discharge Home      Christine Nolan [facility]  ICU [phone]

## 2020-12-03 NOTE — PROGRESS NOTE ADULT - ASSESSMENT
Pt is a 61 y/o F w PMH of AARON, HTN, HLD, prior DVT/PE hx, asthma presented to [facility] for SOB and productive cough prior to admission. Admitted for acute hypoxic respiratory failure 2/2 to COVID infection. Was subsequently intubated and proned on 11/23//20. Was transferred to ICU for further management. Was started on pressors and nimbex drip. Nephrology consulted for ARTEMIO    #ARTEMIO  Pt with ARTEMIO in the setting of septic shock 2/2 to COVID infection. Now likely in ATN. Noted to have Scr of 0.53 on 11/23/20, then Scr further increased to 1.64 on 11/24/20, 2.18 on 11/25/20 and then further to 2.57 on 11/26/20 and 3.0 on 11/27. Patient was started on CRRT and patient now anuric. Adequate clearances noted on CRRT, tolerating well with UF, currently goal net even. Bicarb still low at 20, consider check BHB and increasing TF goal as pt likely has component of starvation ketosis. C/w prismasol solution at this time. Failed diuretic challenge on 12/2. Monitor labs and urine output. Avoid NSAIDs, ACEI/ARBS, RCA and nephrotoxins. Dose medications as per CRRT

## 2020-12-03 NOTE — PROGRESS NOTE ADULT - ATTENDING COMMENTS
Remains intubated, sedated  1.  ARF--continuing CRRT.  Excellent clearances  2.  Respiratory failure, acute--still 50% or greater.  If prone, still increased with high ferritin will -->oXiris filter which may help remove inflammatory mediators.

## 2020-12-03 NOTE — PROGRESS NOTE ADULT - SUBJECTIVE AND OBJECTIVE BOX
[facility] DIVISION OF KIDNEY DISEASES AND HYPERTENSION -- FOLLOW UP NOTE  --------------------------------------------------------------------------------  Shashank Murillo   Nephrology Fellow  Pager NS: [phone]/ [facility]: [phone]  (After 5 pm or on weekends please page the on-call fellow)      Patient is a 60y old  Female who presents with a chief complaint of Acute hypoxemic respiratory failure 2/2 covid19 (03 Dec 2020 06:46)      24 hour events/subjective: Patient remains on CRRT, tolerating well. Intuabted, Fio2: 50%, PEEP 16. No response to diuretic challenge yesterday        PAST HISTORY  --------------------------------------------------------------------------------  No significant changes to PMH, PSH, FHx, SHx, unless otherwise noted    ALLERGIES & MEDICATIONS  --------------------------------------------------------------------------------  Allergies    Kiwi (Unknown)  latex (Anaphylaxis)  latex (Unknown)  penicillin (Other)  penicillin (Unknown)  perfume  hives (Other)  potassium acetate (Other)  soap additives hives (Other)    Intolerances      Standing Inpatient Medications  ALBUTerol    90 MICROgram(s) HFA Inhaler 4 Puff(s) Inhalation every 6 hours  BACItracin   Ointment 1 Application(s) Topical two times a day  caspofungin IVPB      caspofungin IVPB 50 milliGRAM(s) IV Intermittent every 24 hours  ceFAZolin   IVPB 2000 milliGRAM(s) IV Intermittent every 12 hours  chlorhexidine 0.12% Liquid 15 milliLiter(s) Oral Mucosa every 12 hours  chlorhexidine 4% Liquid 1 Application(s) Topical <User Schedule>  chlorhexidine 4% Liquid 1 Application(s) Topical <User Schedule>  cisatracurium Infusion 3 MICROgram(s)/kG/Min IV Continuous <Continuous>  cisatracurium Injectable 20 milliGRAM(s) IV Push once  CRRT Treatment    <Continuous>  dextrose 40% Gel 15 Gram(s) Oral once  dextrose 5%. 1000 milliLiter(s) IV Continuous <Continuous>  dextrose 5%. 1000 milliLiter(s) IV Continuous <Continuous>  dextrose 50% Injectable 25 Gram(s) IV Push once  dextrose 50% Injectable 25 Gram(s) IV Push once  dextrose 50% Injectable 12.5 Gram(s) IV Push once  glucagon  Injectable 1 milliGRAM(s) IntraMuscular once  heparin  Infusion 1900 Unit(s)/Hr IV Continuous <Continuous>  insulin lispro (ADMELOG) corrective regimen sliding scale   SubCutaneous every 6 hours  insulin NPH human recombinant 10 Unit(s) SubCutaneous every 6 hours  ipratropium 17 MICROgram(s) HFA Inhaler 2 Puff(s) Inhalation every 6 hours  ketamine Infusion. 0.25 mG/kG/Hr IV Continuous <Continuous>  levoFLOXacin IVPB 500 milliGRAM(s) IV Intermittent every 24 hours  metoclopramide Injectable 10 milliGRAM(s) IV Push every 6 hours  midazolam Infusion 0.08 mG/kG/Hr IV Continuous <Continuous>  midodrine 10 milliGRAM(s) Oral every 8 hours  norepinephrine Infusion 0.05 MICROgram(s)/kG/Min IV Continuous <Continuous>  nystatin Powder 1 Application(s) Topical two times a day  pantoprazole  Injectable 40 milliGRAM(s) IV Push daily  petrolatum Ophthalmic Ointment 1 Application(s) Both EYES two times a day  polyethylene glycol 3350 17 Gram(s) Oral every 12 hours  PrismaSATE Dialysate BGK 4 / 2.5 5000 milliLiter(s) CRRT <Continuous>  PrismaSOL Filtration BGK 0 / 2.5 5000 milliLiter(s) CRRT <Continuous>  PrismaSOL Filtration BGK 4 / 2.5 5000 milliLiter(s) CRRT <Continuous>    PRN Inpatient Medications  acetaminophen    Suspension .. 650 milliGRAM(s) Oral every 6 hours PRN  sodium chloride 0.9% lock flush 10 milliLiter(s) IV Push every 1 hour PRN      REVIEW OF SYSTEMS  --------------------------------------------------------------------------------  Unable to assess  >>> <<<    VITALS/PHYSICAL EXAM  --------------------------------------------------------------------------------  T(C): 36.2 (12-03-20 @ 08:00), Max: 37.1 (12-02-20 @ 16:00)  HR: 98 (12-03-20 @ 08:00) (75 - 104)  BP: --  RR: 30 (12-03-20 @ 08:00) (30 - 42)  SpO2: 99% (12-03-20 @ 08:00) (90% - 100%)  Wt(kg): --        12-02-20 @ 07:01  -  12-03-20 @ 07:00  --------------------------------------------------------  IN: 2342 mL / OUT: 3103 mL / NET: -761 mL    12-03-20 @ 07:01  -  12-03-20 @ 09:25  --------------------------------------------------------  IN: 120.8 mL / OUT: 151 mL / NET: -30.2 mL      Physical Exam deferred 2/2 COVID19 and PPE preservation    LABS/STUDIES  --------------------------------------------------------------------------------              8.3    36.69 >-----------<  209      [12-03-20 @ 00:29]              26.3     133  |  97  |  18  ----------------------------<  164      [12-03-20 @ 07:10]  4.4   |  21  |  0.92        Ca     8.5     [12-03-20 @ 07:10]      Mg     2.6     [12-03-20 @ 07:10]      Phos  2.6     [12-03-20 @ 07:10]    TPro  6.3  /  Alb  2.7  /  TBili  1.4  /  DBili  x   /  AST  24  /  ALT  12  /  AlkPhos  59  [12-03-20 @ 00:29]    PT/INR: PT 12.3 , INR 1.03       [12-03-20 @ 00:29]  PTT: 80.3       [12-03-20 @ 00:29]          [12-03-20 @ 00:29]    Creatinine Trend:  SCr 0.92 [12-03 @ 07:10]  SCr 1.02 [12-03 @ 00:29]  SCr 1.27 [12-02 @ 18:49]  SCr 1.23 [12-02 @ 12:16]  SCr 1.09 [12-02 @ 06:08]    Urinalysis - [11-26-20 @ 23:57]      Color Yellow / Appearance Slightly Turbid / SG 1.021 / pH 5.5      Gluc Negative / Ketone Negative  / Bili Negative / Urobili Negative       Blood Negative / Protein 30 mg/dL / Leuk Est Moderate / Nitrite Negative      RBC 3 / WBC 8 / Hyaline 4 / Gran  / Sq Epi  / Non Sq Epi 2 / Bacteria Occasional    Urine Sodium <35      [11-26-20 @ 23:56]  Urine Osmolality 353      [11-26-20 @ 23:57]    Ferritin 1257      [12-03-20 @ 02:56]  HbA1c 6.6      [04-16-16 @ 06:00]  Lipid: chol --, , HDL --, LDL --      [11-30-20 @ 05:11]    HCV 0.10, Nonreact      [11-19-20 @ 09:32]

## 2020-12-03 NOTE — PROGRESS NOTE ADULT - ATTENDING COMMENTS
1. Acute hypoxemic respiratory with ARDS from SARS 2, Covid -19  pneumonia. Pt also with morbid obesity with BMI~58. Continue lung protective ventilation. Decrease FIO2 to 65. . Continue PEEP 16.  Will prone today to try to decrease PEEP and FIO2 requirements.  2..Id. S. Aureus ,S. Hominus, and Proteus bacteremia. Continue Cefazolin. WBC increasing. Send fungitel and fungal cx. Started on caspofungin. CT Chest /abd/pelvis when able. Pt desaturated when placed flat today. Continue Levaquin for Stenotrophomonas.  3. Renal failure from ATN. Continue CVVHD with fluid removal.     4. Hypotension from septic shock. Still dependent on norepinephrine.

## 2020-12-03 NOTE — PROGRESS NOTE ADULT - ASSESSMENT
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 - used symbicort intermittently), AARON, peritonitis s/p Oral's procedure and ileostomy (reversed 2011) admitted with COVID19    Developed MSSA Bacteremia  Sputum culture with MSSA (likely PNA as initial source)  BCx also with Proteus (Cefazolin susceptible) - ?Urinary Source    Now with rising leukocytosis which may be secondary to previous corticosteroids or secondary to uncontrolled/persistent MSSA infection   Will need line replacement as patient was actively bacteremia at time of currently line placement  Would pursue CT C/A/P when able to exclude abscess / uncontrolled focus for bacteremia    Patient also with Stenotrophomonas in sputum; given tenuous respiratory status and rising WBC would cover for this etiology  Leukocytosis improved today but now requiring proning for oxygenation    RECOMMENDATIONS:    #Positive Sputum Culture (Stenotrophomonas)  --Continue Levofloxacin 500 mg IV Q24H (while on CRRT) x 7 days total of therapy    #MSSA bacteremia, Leukocytosis (worsening)  --Continue Cefazolin 2g IV Q12H while on CRRT  --When able recommend CT C/A/P if able to exclude abscess / uncontrolled focus for bacteremia  --Continue Caspofungin for now; if no growth of yeast on blood cultures would discontinue in next 24H-48H  --Recommend line change (especially if clinical instability or persistence in bacteremia)  --Check TTE later this week  --Continue to follow CBC with diff  --Continue to follow renal function (Cr/BUN)  --Continue to follow temperature curve  --Follow up on preliminary blood cultures (repeat Q48H until cleared)    #P. mirablis bacteremia  --Continue Cefazolin 2g IV q12h    #COVID-19, Hypoxic Respiratory Failure (persistent)  - please hold further Dexamethasone (in setting of active bacteremia)  - Continue supplemental O2 and supportive care per primary team  - Continue Contact and Airborne Isolation precautions    #Antibiotic allergy  - uncertain reaction - tolerated Ceftriaxone, Cefepime and Cefazolin in this admission  - outpatient  follow-up with Allergy/Immunology    I will continue to follow. Please feel free to contact me with any further questions.    Femi Adkins M.D.  [facility] Division of Infectious Disease  8AM-5PM: Pager Number [phone]  After Hours (or if no response): Please contact the Infectious Diseases Office at [phone]     The above assessment and plan were discussed with ANTHONY NP

## 2020-12-03 NOTE — PROGRESS NOTE ADULT - SUBJECTIVE AND OBJECTIVE BOX
Follow Up:  COVID19, MSSA Bacteremia, Proteus Bacteremia, Positive SPutum Culture     Interval History: afebrile overnight. remains on CRRT. Required proning today for hypoxemia.     REVIEW OF SYSTEMS  [  x] ROS unobtainable because:  intubated and sedated  [  ] All other systems negative except as noted below:     Constitutional:  [ ] fever [ ] chills  [ ] weight loss  [ ] weakness  Skin:  [ ] rash [ ] phlebitis	  Eyes: [ ] icterus [ ] pain  [ ] discharge	  ENMT: [ ] sore throat  [ ] thrush [ ] ulcers [ ] exudates  Respiratory: [  ] dyspnea [ ] hemoptysis [ ] cough [ ] sputum	  Cardiovascular:  [ ] chest pain [ ] palpitations [ ] edema	  Gastrointestinal:  [ ] nausea [ ] vomiting [ ] diarrhea [ ] constipation [ ] pain	  Genitourinary:  [ ] dysuria [ ] frequency [ ] hematuria [ ] discharge [ ] flank pain  [ ] incontinence  Musculoskeletal:  [ ] myalgias [ ] arthralgias [ ] arthritis  [ ] back pain  Neurological:  [ ] headache [ ] seizures  [ ] confusion/altered mental status    Allergies  Kiwi (Unknown)  latex (Anaphylaxis)  latex (Unknown)  penicillin (Other)  penicillin (Unknown)  perfume  hives (Other)  potassium acetate (Other)  soap additives hives (Other)        ANTIMICROBIALS:  caspofungin IVPB    caspofungin IVPB 50 every 24 hours  ceFAZolin   IVPB 2000 every 8 hours  levoFLOXacin IVPB 500 every 24 hours      OTHER MEDS:  MEDICATIONS  (STANDING):  acetaminophen    Suspension .. 650 every 6 hours PRN  ALBUTerol    90 MICROgram(s) HFA Inhaler 4 every 6 hours  cisatracurium Infusion 3 <Continuous>  dextrose 40% Gel 15 once  dextrose 50% Injectable 25 once  dextrose 50% Injectable 25 once  dextrose 50% Injectable 12.5 once  glucagon  Injectable 1 once  heparin  Infusion 1900 <Continuous>  insulin lispro (ADMELOG) corrective regimen sliding scale  every 6 hours  insulin NPH human recombinant 10 every 6 hours  ipratropium 17 MICROgram(s) HFA Inhaler 2 every 6 hours  ketamine Infusion. 0.25 <Continuous>  metoclopramide Injectable 10 every 6 hours  midazolam Infusion 0.08 <Continuous>  midodrine 10 every 8 hours  norepinephrine Infusion 0.05 <Continuous>  pantoprazole  Injectable 40 daily  polyethylene glycol 3350 17 every 12 hours      Vital Signs Last 24 Hrs  T(C): 35.8 (03 Dec 2020 14:00), Max: 36.8 (03 Dec 2020 04:00)  T(F): 96.4 (03 Dec 2020 14:00), Max: 98.2 (03 Dec 2020 04:00)  HR: 104 (03 Dec 2020 16:00) (74 - 108)  BP: --  BP(mean): --  RR: 30 (03 Dec 2020 16:00) (30 - 42)  SpO2: 100% (03 Dec 2020 16:00) (90% - 100%)    PHYSICAL EXAMINATION:  General: Intubated and Sedated  HEENT: +ETT  Neck: Supple  Cardiac: RRR, No M/R/G  Resp: CTAB, No Wh/Rh/Ra  Abdomen: NBS, NT/ND, No HSM, No rigidity or guarding  MSK: No LE edema. No Calf tenderness  : Rivera  Skin: No rashes or lesions. Skin is warm and dry to the touch.   Vascular: [facility] and [facility] (No surrounding erythema, drainage or tenderness to palpation)  Neuro: Intubated and Sedated  Psych: Unable to assess - intubated and sedated    LABORATORY:                          8.3    36.69 )-----------( 209      ( 03 Dec 2020 00:29 )             26.3       12-03    133<L>  |  99  |  17  ----------------------------<  212<H>  4.6   |  22  |  0.87    Ca    8.6      03 Dec 2020 11:40  Phos  3.2     12-03  Mg     2.6     12-03    TPro  6.3  /  Alb  2.7<L>  /  TBili  1.4<H>  /  DBili  x   /  AST  24  /  ALT  12  /  AlkPhos  59  12-03    C-Reactive Protein, Serum: 20.99 mg/dL (12-03-20 @ 00:29)  C-Reactive Protein, Serum: 25.79 mg/dL (12-02-20 @ 00:17)  C-Reactive Protein, Serum: 12.29 mg/dL (11-30-20 @ 05:11)    Ferritin, Serum: 1257 ng/mL (12-03-20 @ 02:56)  Ferritin, Serum: 1309 ng/mL (12-02-20 @ 03:52)  Ferritin, Serum: 1041 ng/mL (11-30-20 @ 09:19)    D-Dimer Assay, Quantitative: 2017 ng/mL DDU (12-03-20 @ 00:29)  D-Dimer Assay, Quantitative: 2023 ng/mL DDU (12-02-20 @ 00:17)  D-Dimer Assay, Quantitative: 1072 ng/mL DDU (11-30-20 @ 05:11)    MICROBIOLOGY:    .Sputum Sputum  12-01-20   Moderate Stenotrophomonas maltophilia  Normal Respiratory Jocelynn present  --  Stenotrophomonas maltophilia    .Blood Blood-Peripheral  12-01-20   No growth to date.  --  --      .Urine Clean Catch (Midstream)  11-30-20   <10,000 CFU/mL Normal Urogenital Jocelynn  --  --      .Blood Blood-Peripheral  11-28-20   Growth in aerobic and anaerobic bottles: Staphylococcus aureus See  previous culture [id number]  --    Growth in aerobic bottle: Gram Positive Cocci in Clusters  Growth in anaerobic bottle: Gram Positive Cocci in Clusters      .Blood Blood-Peripheral  11-27-20   Growth in aerobic and anaerobic bottles: Staphylococcus aureus  Growth in anaerobic bottle: Staphylococcus hominis  Coag Negative Staphylococcus  Single set isolate, possible contaminant. Contact  Microbiology if susceptibility testing clinically  indicated.  ***Blood Panel PCR results on this specimen are available  approximately 3 hours after the Gram stain result.***  Gram stain, PCR, and/or culture results may not always  correspond due to difference in methodologies.  ************************************************************  This PCR assay was performed using [employer].  The following targets are tested for: Enterococcus,  vancomycin resistant enterococci, Listeria monocytogenes,  coagulase negative staphylococci, S. aureus,  methicillin resistant S. aureus, Streptococcus agalactiae  (Group B), S. pneumoniae, S. pyogenes (Group A),  Acinetobacter baumannii, Enterobacter cloacae, E. coli,  Klebsiella oxytoca, K. pneumoniae, Proteus sp.,  Serratia marcescens, Haemophilus influenzae,  Neisseria meningitidis, Pseudomonas aeruginosa, Candida  albicans, C. glabrata, C krusei, C parapsilosis,  C. tropicalis and the KPC resistance gene.  --  Blood Culture PCR  Staphylococcus aureus      .Sputum  11-25-20   Numerous Staphylococcus aureus  Normal Respiratory Jocelynn absent  --  Staphylococcus aureus      .Sputum Sputum  11-24-20   Moderate Staphylococcus aureus  Normal Respiratory Jocelynn absent  --  Staphylococcus aureus      .Blood Blood-Peripheral  11-19-20   No Growth Final  --  --    RADIOLOGY:    <The imaging below has been reviewed and visualized by me independently. Findings as detailed in report below>    EXAM:  XR CHEST AP OR PA 1V                        PROCEDURE DATE:  11/29/2020    The heart is enlarged. Diffuse space opacities seen throughout both lungs which remain unchanged when compared to previous study done November 28, 2020. Endotracheal tube is in good position. Changes compatible with pneumonia and or ARDS. NG tube is in the stomach however its tip is not seen on the current study. A central line is seen on the left and the tip is in the superior vena cava. No pneumothorax.

## 2020-12-04 LAB
ALBUMIN SERPL ELPH-MCNC: 3.1 G/DL — LOW (ref 3.3–5)
ALBUMIN SERPL ELPH-MCNC: 3.1 G/DL — LOW (ref 3.3–5)
ALP SERPL-CCNC: 76 U/L — SIGNIFICANT CHANGE UP (ref 40–120)
ALP SERPL-CCNC: 82 U/L — SIGNIFICANT CHANGE UP (ref 40–120)
ALT FLD-CCNC: 12 U/L — SIGNIFICANT CHANGE UP (ref 10–45)
ALT FLD-CCNC: 31 U/L — SIGNIFICANT CHANGE UP (ref 10–45)
ANION GAP SERPL CALC-SCNC: 15 MMOL/L — SIGNIFICANT CHANGE UP (ref 5–17)
ANION GAP SERPL CALC-SCNC: 15 MMOL/L — SIGNIFICANT CHANGE UP (ref 5–17)
APTT BLD: 124 SEC — CRITICAL HIGH (ref 27.5–35.5)
APTT BLD: 127.5 SEC — CRITICAL HIGH (ref 27.5–35.5)
APTT BLD: 94.4 SEC — HIGH (ref 27.5–35.5)
AST SERPL-CCNC: 30 U/L — SIGNIFICANT CHANGE UP (ref 10–40)
AST SERPL-CCNC: 85 U/L — HIGH (ref 10–40)
BILIRUB SERPL-MCNC: 1.3 MG/DL — HIGH (ref 0.2–1.2)
BILIRUB SERPL-MCNC: 1.3 MG/DL — HIGH (ref 0.2–1.2)
BLD GP AB SCN SERPL QL: NEGATIVE — SIGNIFICANT CHANGE UP
BUN SERPL-MCNC: 15 MG/DL — SIGNIFICANT CHANGE UP (ref 7–23)
BUN SERPL-MCNC: 16 MG/DL — SIGNIFICANT CHANGE UP (ref 7–23)
CALCIUM SERPL-MCNC: 8.8 MG/DL — SIGNIFICANT CHANGE UP (ref 8.4–10.5)
CALCIUM SERPL-MCNC: 9.1 MG/DL — SIGNIFICANT CHANGE UP (ref 8.4–10.5)
CHLORIDE SERPL-SCNC: 98 MMOL/L — SIGNIFICANT CHANGE UP (ref 96–108)
CHLORIDE SERPL-SCNC: 99 MMOL/L — SIGNIFICANT CHANGE UP (ref 96–108)
CO2 SERPL-SCNC: 20 MMOL/L — LOW (ref 22–31)
CO2 SERPL-SCNC: 21 MMOL/L — LOW (ref 22–31)
CREAT SERPL-MCNC: 0.77 MG/DL — SIGNIFICANT CHANGE UP (ref 0.5–1.3)
CREAT SERPL-MCNC: 0.81 MG/DL — SIGNIFICANT CHANGE UP (ref 0.5–1.3)
CRP SERPL-MCNC: 17.22 MG/DL — HIGH (ref 0–0.4)
D DIMER BLD IA.RAPID-MCNC: 2006 NG/ML DDU — HIGH
FERRITIN SERPL-MCNC: 1416 NG/ML — HIGH (ref 15–150)
FUNGITELL: <31 PG/ML — SIGNIFICANT CHANGE UP
GAS PNL BLDA: SIGNIFICANT CHANGE UP
GLUCOSE BLDC GLUCOMTR-MCNC: 165 MG/DL — HIGH (ref 70–99)
GLUCOSE BLDC GLUCOMTR-MCNC: 171 MG/DL — HIGH (ref 70–99)
GLUCOSE BLDC GLUCOMTR-MCNC: 185 MG/DL — HIGH (ref 70–99)
GLUCOSE BLDC GLUCOMTR-MCNC: 192 MG/DL — HIGH (ref 70–99)
GLUCOSE SERPL-MCNC: 189 MG/DL — HIGH (ref 70–99)
GLUCOSE SERPL-MCNC: 217 MG/DL — HIGH (ref 70–99)
HCT VFR BLD CALC: 28.2 % — LOW (ref 34.5–45)
HCT VFR BLD CALC: 28.6 % — LOW (ref 34.5–45)
HGB BLD-MCNC: 8.5 G/DL — LOW (ref 11.5–15.5)
HGB BLD-MCNC: 8.9 G/DL — LOW (ref 11.5–15.5)
LDH SERPL L TO P-CCNC: 413 U/L — HIGH (ref 50–242)
MAGNESIUM SERPL-MCNC: 2.6 MG/DL — SIGNIFICANT CHANGE UP (ref 1.6–2.6)
MAGNESIUM SERPL-MCNC: 2.6 MG/DL — SIGNIFICANT CHANGE UP (ref 1.6–2.6)
MCHC RBC-ENTMCNC: 27.8 PG — SIGNIFICANT CHANGE UP (ref 27–34)
MCHC RBC-ENTMCNC: 28.1 PG — SIGNIFICANT CHANGE UP (ref 27–34)
MCHC RBC-ENTMCNC: 30.1 GM/DL — LOW (ref 32–36)
MCHC RBC-ENTMCNC: 31.1 GM/DL — LOW (ref 32–36)
MCV RBC AUTO: 90.2 FL — SIGNIFICANT CHANGE UP (ref 80–100)
MCV RBC AUTO: 92.2 FL — SIGNIFICANT CHANGE UP (ref 80–100)
NRBC # BLD: 10 /100 WBCS — HIGH (ref 0–0)
NRBC # BLD: 4 /100 WBCS — HIGH (ref 0–0)
PHOSPHATE SERPL-MCNC: 2.6 MG/DL — SIGNIFICANT CHANGE UP (ref 2.5–4.5)
PHOSPHATE SERPL-MCNC: 2.9 MG/DL — SIGNIFICANT CHANGE UP (ref 2.5–4.5)
PLATELET # BLD AUTO: 235 K/UL — SIGNIFICANT CHANGE UP (ref 150–400)
PLATELET # BLD AUTO: 276 K/UL — SIGNIFICANT CHANGE UP (ref 150–400)
POTASSIUM SERPL-MCNC: 4.2 MMOL/L — SIGNIFICANT CHANGE UP (ref 3.5–5.3)
POTASSIUM SERPL-MCNC: 4.8 MMOL/L — SIGNIFICANT CHANGE UP (ref 3.5–5.3)
POTASSIUM SERPL-SCNC: 4.2 MMOL/L — SIGNIFICANT CHANGE UP (ref 3.5–5.3)
POTASSIUM SERPL-SCNC: 4.8 MMOL/L — SIGNIFICANT CHANGE UP (ref 3.5–5.3)
PROT SERPL-MCNC: 7.2 G/DL — SIGNIFICANT CHANGE UP (ref 6–8.3)
PROT SERPL-MCNC: 7.6 G/DL — SIGNIFICANT CHANGE UP (ref 6–8.3)
RBC # BLD: 3.06 M/UL — LOW (ref 3.8–5.2)
RBC # BLD: 3.17 M/UL — LOW (ref 3.8–5.2)
RBC # FLD: 17.5 % — HIGH (ref 10.3–14.5)
RBC # FLD: 18.6 % — HIGH (ref 10.3–14.5)
RH IG SCN BLD-IMP: POSITIVE — SIGNIFICANT CHANGE UP
SODIUM SERPL-SCNC: 133 MMOL/L — LOW (ref 135–145)
SODIUM SERPL-SCNC: 135 MMOL/L — SIGNIFICANT CHANGE UP (ref 135–145)
WBC # BLD: 41.28 K/UL — CRITICAL HIGH (ref 3.8–10.5)
WBC # BLD: 50.91 K/UL — CRITICAL HIGH (ref 3.8–10.5)
WBC # FLD AUTO: 41.28 K/UL — CRITICAL HIGH (ref 3.8–10.5)
WBC # FLD AUTO: 50.91 K/UL — CRITICAL HIGH (ref 3.8–10.5)

## 2020-12-04 PROCEDURE — 99291 CRITICAL CARE FIRST HOUR: CPT

## 2020-12-04 PROCEDURE — 99233 SBSQ HOSP IP/OBS HIGH 50: CPT | Mod: GC

## 2020-12-04 PROCEDURE — 71260 CT THORAX DX C+: CPT | Mod: 26

## 2020-12-04 PROCEDURE — 71045 X-RAY EXAM CHEST 1 VIEW: CPT | Mod: 26

## 2020-12-04 PROCEDURE — 99233 SBSQ HOSP IP/OBS HIGH 50: CPT

## 2020-12-04 PROCEDURE — 74177 CT ABD & PELVIS W/CONTRAST: CPT | Mod: 26

## 2020-12-04 RX ORDER — SODIUM CHLORIDE 9 MG/ML
500 INJECTION, SOLUTION INTRAVENOUS ONCE
Refills: 0 | Status: COMPLETED | OUTPATIENT
Start: 2020-12-04 | End: 2020-12-04

## 2020-12-04 RX ORDER — FENTANYL CITRATE 50 UG/ML
1 INJECTION INTRAVENOUS
Qty: 5000 | Refills: 0 | Status: DISCONTINUED | OUTPATIENT
Start: 2020-12-04 | End: 2020-12-06

## 2020-12-04 RX ADMIN — SODIUM CHLORIDE 500 MILLILITER(S): 9 INJECTION, SOLUTION INTRAVENOUS at 16:00

## 2020-12-04 RX ADMIN — Medication 2 PUFF(S): at 05:08

## 2020-12-04 RX ADMIN — ALBUTEROL 4 PUFF(S): 90 AEROSOL, METERED ORAL at 11:23

## 2020-12-04 RX ADMIN — KETAMINE HYDROCHLORIDE 3.4 MG/KG/HR: 100 INJECTION INTRAMUSCULAR; INTRAVENOUS at 20:02

## 2020-12-04 RX ADMIN — Medication 1 APPLICATION(S): at 05:53

## 2020-12-04 RX ADMIN — Medication 1: at 12:24

## 2020-12-04 RX ADMIN — HUMAN INSULIN 10 UNIT(S): 100 INJECTION, SUSPENSION SUBCUTANEOUS at 05:54

## 2020-12-04 RX ADMIN — CHLORHEXIDINE GLUCONATE 1 APPLICATION(S): 213 SOLUTION TOPICAL at 13:18

## 2020-12-04 RX ADMIN — NYSTATIN CREAM 1 APPLICATION(S): 100000 CREAM TOPICAL at 05:53

## 2020-12-04 RX ADMIN — PANTOPRAZOLE SODIUM 40 MILLIGRAM(S): 20 TABLET, DELAYED RELEASE ORAL at 12:31

## 2020-12-04 RX ADMIN — Medication 100 MILLIGRAM(S): at 14:08

## 2020-12-04 RX ADMIN — POLYETHYLENE GLYCOL 3350 17 GRAM(S): 17 POWDER, FOR SOLUTION ORAL at 17:42

## 2020-12-04 RX ADMIN — CHLORHEXIDINE GLUCONATE 1 APPLICATION(S): 213 SOLUTION TOPICAL at 05:54

## 2020-12-04 RX ADMIN — MIDODRINE HYDROCHLORIDE 10 MILLIGRAM(S): 2.5 TABLET ORAL at 17:39

## 2020-12-04 RX ADMIN — HUMAN INSULIN 10 UNIT(S): 100 INJECTION, SUSPENSION SUBCUTANEOUS at 12:28

## 2020-12-04 RX ADMIN — Medication 10 MILLIGRAM(S): at 23:02

## 2020-12-04 RX ADMIN — Medication 100 MILLIGRAM(S): at 05:51

## 2020-12-04 RX ADMIN — Medication 10 MILLIGRAM(S): at 12:27

## 2020-12-04 RX ADMIN — Medication 100 MILLIGRAM(S): at 23:07

## 2020-12-04 RX ADMIN — Medication 2 PUFF(S): at 11:24

## 2020-12-04 RX ADMIN — Medication 1: at 05:52

## 2020-12-04 RX ADMIN — Medication 1 APPLICATION(S): at 17:42

## 2020-12-04 RX ADMIN — FENTANYL CITRATE 6.81 MICROGRAM(S)/KG/HR: 50 INJECTION INTRAVENOUS at 14:26

## 2020-12-04 RX ADMIN — Medication 10 MILLIGRAM(S): at 17:39

## 2020-12-04 RX ADMIN — CHLORHEXIDINE GLUCONATE 15 MILLILITER(S): 213 SOLUTION TOPICAL at 17:41

## 2020-12-04 RX ADMIN — Medication 1: at 23:01

## 2020-12-04 RX ADMIN — FENTANYL CITRATE 6.81 MICROGRAM(S)/KG/HR: 50 INJECTION INTRAVENOUS at 20:03

## 2020-12-04 RX ADMIN — CHLORHEXIDINE GLUCONATE 15 MILLILITER(S): 213 SOLUTION TOPICAL at 05:54

## 2020-12-04 RX ADMIN — Medication 1 APPLICATION(S): at 17:41

## 2020-12-04 RX ADMIN — NYSTATIN CREAM 1 APPLICATION(S): 100000 CREAM TOPICAL at 18:04

## 2020-12-04 RX ADMIN — Medication 2 PUFF(S): at 17:02

## 2020-12-04 RX ADMIN — HEPARIN SODIUM 19 UNIT(S)/HR: 5000 INJECTION INTRAVENOUS; SUBCUTANEOUS at 03:30

## 2020-12-04 RX ADMIN — MIDODRINE HYDROCHLORIDE 10 MILLIGRAM(S): 2.5 TABLET ORAL at 22:55

## 2020-12-04 RX ADMIN — HUMAN INSULIN 10 UNIT(S): 100 INJECTION, SUSPENSION SUBCUTANEOUS at 17:38

## 2020-12-04 RX ADMIN — HUMAN INSULIN 10 UNIT(S): 100 INJECTION, SUSPENSION SUBCUTANEOUS at 00:25

## 2020-12-04 RX ADMIN — Medication 2: at 17:36

## 2020-12-04 RX ADMIN — HUMAN INSULIN 10 UNIT(S): 100 INJECTION, SUSPENSION SUBCUTANEOUS at 23:01

## 2020-12-04 RX ADMIN — Medication 10 MILLIGRAM(S): at 00:15

## 2020-12-04 RX ADMIN — POLYETHYLENE GLYCOL 3350 17 GRAM(S): 17 POWDER, FOR SOLUTION ORAL at 05:53

## 2020-12-04 RX ADMIN — ALBUTEROL 4 PUFF(S): 90 AEROSOL, METERED ORAL at 05:08

## 2020-12-04 RX ADMIN — Medication 10 MILLIGRAM(S): at 05:53

## 2020-12-04 RX ADMIN — ALBUTEROL 4 PUFF(S): 90 AEROSOL, METERED ORAL at 17:01

## 2020-12-04 RX ADMIN — MIDAZOLAM HYDROCHLORIDE 10.9 MG/KG/HR: 1 INJECTION, SOLUTION INTRAMUSCULAR; INTRAVENOUS at 20:02

## 2020-12-04 RX ADMIN — MIDODRINE HYDROCHLORIDE 10 MILLIGRAM(S): 2.5 TABLET ORAL at 05:53

## 2020-12-04 RX ADMIN — Medication 12.8 MICROGRAM(S)/KG/MIN: at 20:02

## 2020-12-04 NOTE — PROGRESS NOTE ADULT - ASSESSMENT
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 - used symbicort intermittently), AARON, peritonitis s/p Oral's procedure and ileostomy (reversed 2011) admitted with COVID19    Developed MSSA Bacteremia  Sputum culture with MSSA (likely PNA as initial source)  BCx also with Proteus (Cefazolin susceptible) - ?Urinary Source    Now with rising leukocytosis which may be secondary to previous corticosteroids or secondary to uncontrolled/persistent MSSA infection   Will need line replacement as patient was actively bacteremia at time of currently line placement  Would pursue CT C/A/P when able to exclude abscess / uncontrolled focus for bacteremia    Patient also with Stenotrophomonas in sputum; given tenuous respiratory status and rising WBC would cover for this etiology    WBC count remains elevated/rising and clinical status remains tenuous     RECOMMENDATIONS:    #Positive Sputum Culture (Stenotrophomonas)  --Continue Levofloxacin 500 mg IV Q24H (while on CRRT) x 7 days total of therapy    #MSSA bacteremia, Leukocytosis (worsening)  --Continue Cefazolin 2g IV Q12H while on CRRT  --Planned for CT C/A/P with IV contrast tonight  --Continue Caspofungin for now; if no growth of yeast on blood cultures would discontinue in next 24H  --Recommend line change (especially if clinical instability or persistence in bacteremia)  --Check TTE later this week  --Continue to follow CBC with diff  --Continue to follow renal function (Cr/BUN)  --Continue to follow temperature curve  --Follow up on preliminary blood cultures (repeat Q48H until cleared)    #P. mirablis bacteremia  --Continue Cefazolin 2g IV q12h (while on CRRT)    #COVID-19, Hypoxic Respiratory Failure (persistent)  - please hold further Dexamethasone (in setting of active bacteremia)  - Continue supplemental O2 and supportive care per primary team  - Continue Contact and Airborne Isolation precautions    #Antibiotic allergy  - uncertain reaction - tolerated Ceftriaxone, Cefepime and Cefazolin in this admission  - outpatient  follow-up with Allergy/Immunology    I will be away over this upcoming weekend. Please contact the Infectious Diseases Office with any further questions or concerns.     Femi Adkins M.D.  [facility] Division of Infectious Disease  8AM-5PM: Pager Number [phone]  After Hours (or if no response): Please contact the Infectious Diseases Office at [phone]     The above assessment and plan were discussed with Dr Louie

## 2020-12-04 NOTE — PROGRESS NOTE ADULT - ASSESSMENT
Pt is a 61 y/o F w PMH of AARON, HTN, HLD, prior DVT/PE hx, asthma presented to [facility] for SOB and productive cough prior to admission. Admitted for acute hypoxic respiratory failure 2/2 to COVID infection. Was subsequently intubated and proned on 11/23//20. Was transferred to ICU for further management. Was started on pressors and nimbex drip. Nephrology consulted for ARTEMIO    #ARTEMIO  Pt with ARTEMIO in the setting of septic shock 2/2 to COVID infection. Now likely in ATN. Noted to have Scr of 0.53 on 11/23/20, then Scr further increased to 1.64 on 11/24/20, 2.18 on 11/25/20 and then further to 2.57 on 11/26/20 and 3.0 on 11/27. Patient was started on CRRT and patient now anuric. Adequate clearances noted on CRRT, tolerating well with UF. Bicarb 20-22, had slight elevation in BHB, likely has component of starvation ketosis as TF intermittently held. C/w prismasol solution at this time. Failed diuretic challenge on 12/2. Monitor labs and urine output. Avoid NSAIDs, ACEI/ARBS, RCA and nephrotoxins. Dose medications as per CRRT

## 2020-12-04 NOTE — PROGRESS NOTE ADULT - ASSESSMENT
60 year old F w/ pmh of HTN, DVT on Xarelto, AARON who presented w/ fevers found to have COVI w/ superimposed PNA and bacteremia complicated by septic shock and ATN requiring CRRT and hypoxic resp failure requiring intubation.     Neuro  - Pt proned for 20 hours with improved oxygenation. Nimbex discontinued due to improving oxygenation  - Continue ketamine, versed. Fentanyl gtt started at 1 mcg/kg for pain control    Resp  Acute hypoxic resp failure 2/2 covid PNA  - Currently on 60% FiO2 and O2 sat > 93%, will check ABG now    CV  - Hyperdynamic LV, keep net even   - ST due to sepsis,    Hx of DVt on Xarelto  - Continue heparin gtt, reduced to 1700 due to pTT of 124 on 1900     60 year old F w/ pmh of HTN, DVT on Xarelto, AARON who presented w/ fevers found to have COVI w/ superimposed PNA and bacteremia complicated by septic shock and ATN requiring CRRT and hypoxic resp failure requiring intubation.     Neuro  - Pt proned for 20 hours with improved oxygenation. Nimbex discontinued due to improving oxygenation  - Continue ketamine, versed. Fentanyl gtt started at 1 mcg/kg for pain control    Resp  Acute hypoxic resp failure 2/2 covid PNA  - Currently on 60% FiO2 and O2 sat > 93%, will check ABG now    CV  - Hyperdynamic LV, keep net even   - ST due to sepsis,    Hx of DVt on Xarelto  - Continue heparin gtt, reduced to 1700 due to pTT of 124 on 1900  - CT negative for PE    GI  - Continue TF, tolerating well  - Continue bowel regimen, last BM 12/3.       -      60 year old F w/ pmh of HTN, DVT on Xarelto, AARON who presented w/ fevers found to have COVI w/ superimposed PNA and bacteremia complicated by septic shock and ATN requiring CRRT and hypoxic resp failure requiring intubation.     Neuro  - Pt proned for 20 hours with improved oxygenation. Nimbex discontinued due to improving oxygenation  - Continue ketamine, versed. Fentanyl gtt started at 1 mcg/kg for pain control    Resp  Acute hypoxic resp failure 2/2 covid PNA  - Currently on 60% FiO2 and O2 sat > 93%, will check ABG now    CV  - Hyperdynamic LV, keep net even   - ST due to sepsis,    Hx of DVt on Xarelto  - Continue heparin gtt, reduced to 1700 due to pTT of 124 on 1900  - CT negative for PE    GI  - Continue TF, tolerating well  - Continue bowel regimen, last BM 12/3.       - No negro in place     Renal  ARF 2/2 ATN in the setting of septic shock  - Continue CRRT, keep net even. 500 cc of LR given for tachycardia and dropping BP  - Will receive contrast dye for CT scan, risk/benefit discussed with HCP Scotty ( )  - HD line due to be changed in AM    ID  Covid 19  - Completed remdesivir, dexamethasone discontinued in setting of bacteremia  - + Sputum MSSA , + bacteremia P mirablis , source urine?  - WBC now 41 from 36. Will obtain CT C/A/P to rule out other source of infection at 09:30 pm  - Continue cefazolin and Levofloxacin ( Day 2 ) for total of 7 days  - Continue caspofungin for now, no fungal growth. Will discontinue if no growth tomorrow.   - BC from 12/1 NGTD    Condition guarded, Updated  and facetimed    Citlali Low, DNP

## 2020-12-04 NOTE — PROGRESS NOTE ADULT - ATTENDING COMMENTS
1. Acute hypoxemic respiratory with ARDS from SARS 2, Covid -19  pneumonia. Pt also with morbid obesity with BMI~58. Continue lung protective ventilation. Decrease FIO2 to 65. . Continue PEEP 16.  Will prone today to try to decrease PEEP and FIO2 requirements.  2..Id. S. Aureus ,S. Hominus, and Proteus bacteremia. Continue Cefazolin. WBC increasing. Send fungitel and fungal cx. Started on caspofungin. CT Chest /abd/pelvis when able. Pt desaturated when placed flat today. Continue Levaquin for Stenotrophomonas.  3. Renal failure from ATN. Continue CVVHD with fluid removal.     4. Hypotension from septic shock. Still dependent on norepinephrine. 1. Acute hypoxemic respiratory with ARDS from SARS 2, Covid -19  pneumonia. Pt also with morbid obesity with BMI~58. Tolerating proning with good results. Now supines again with PEEP decreased from 16-10 and FIO2 decreased from 70-40%  2..Id. S. Aureus ,S. Hominus, and Proteus bacteremia. Continue Cefazolin. WBC increasing. Send fungitel and fungal cx. Started on caspofungin. CT Chest /abd/pelvis when able. Pt desaturated when placed flat today. Continue Levaquin for Stenotrophomonas. Ok to send for CT chest abd/pelvis with contrast r/o abscess infection.  3. Renal failure from ATN. Continue CVVHD with fluid removal.     4. Hypotension from septic shock. Still dependent on norepinephrine.

## 2020-12-04 NOTE — PROGRESS NOTE ADULT - SUBJECTIVE AND OBJECTIVE BOX
[facility] DIVISION OF KIDNEY DISEASES AND HYPERTENSION -- FOLLOW UP NOTE  --------------------------------------------------------------------------------  Shashank Murillo   Nephrology Fellow  Pager NS: [phone]/ [facility]: [phone]  (After 5 pm or on weekends please page the on-call fellow)      Patient is a 60y old  Female who presents with a chief complaint of Acute hypoxemic respiratory failure 2/2 covid19 (04 Dec 2020 07:12)      24 hour events/subjective: Patient seen and examined at the bedside. Vital signs, labs, medications reviewed.        PAST HISTORY  --------------------------------------------------------------------------------  No significant changes to PMH, PSH, FHx, SHx, unless otherwise noted    ALLERGIES & MEDICATIONS  --------------------------------------------------------------------------------  Allergies    Kiwi (Unknown)  latex (Anaphylaxis)  latex (Unknown)  penicillin (Other)  penicillin (Unknown)  perfume  hives (Other)  potassium acetate (Other)  soap additives hives (Other)    Intolerances      Standing Inpatient Medications  ALBUTerol    90 MICROgram(s) HFA Inhaler 4 Puff(s) Inhalation every 6 hours  BACItracin   Ointment 1 Application(s) Topical two times a day  caspofungin IVPB      caspofungin IVPB 50 milliGRAM(s) IV Intermittent every 24 hours  ceFAZolin   IVPB 2000 milliGRAM(s) IV Intermittent every 8 hours  chlorhexidine 0.12% Liquid 15 milliLiter(s) Oral Mucosa every 12 hours  chlorhexidine 4% Liquid 1 Application(s) Topical <User Schedule>  chlorhexidine 4% Liquid 1 Application(s) Topical <User Schedule>  cisatracurium Infusion 3 MICROgram(s)/kG/Min IV Continuous <Continuous>  CRRT Treatment    <Continuous>  dextrose 40% Gel 15 Gram(s) Oral once  dextrose 5%. 1000 milliLiter(s) IV Continuous <Continuous>  dextrose 5%. 1000 milliLiter(s) IV Continuous <Continuous>  dextrose 50% Injectable 25 Gram(s) IV Push once  dextrose 50% Injectable 12.5 Gram(s) IV Push once  dextrose 50% Injectable 25 Gram(s) IV Push once  glucagon  Injectable 1 milliGRAM(s) IntraMuscular once  heparin  Infusion 1900 Unit(s)/Hr IV Continuous <Continuous>  insulin lispro (ADMELOG) corrective regimen sliding scale   SubCutaneous every 6 hours  insulin NPH human recombinant 10 Unit(s) SubCutaneous every 6 hours  ipratropium 17 MICROgram(s) HFA Inhaler 2 Puff(s) Inhalation every 6 hours  ketamine Infusion. 0.25 mG/kG/Hr IV Continuous <Continuous>  levoFLOXacin IVPB 500 milliGRAM(s) IV Intermittent every 24 hours  metoclopramide Injectable 10 milliGRAM(s) IV Push every 6 hours  midazolam Infusion 0.08 mG/kG/Hr IV Continuous <Continuous>  midodrine 10 milliGRAM(s) Oral every 8 hours  norepinephrine Infusion 0.05 MICROgram(s)/kG/Min IV Continuous <Continuous>  nystatin Powder 1 Application(s) Topical two times a day  pantoprazole  Injectable 40 milliGRAM(s) IV Push daily  petrolatum Ophthalmic Ointment 1 Application(s) Both EYES two times a day  polyethylene glycol 3350 17 Gram(s) Oral every 12 hours  PrismaSATE Dialysate BGK 4 / 2.5 5000 milliLiter(s) CRRT <Continuous>  PrismaSOL Filtration BGK 0 / 2.5 5000 milliLiter(s) CRRT <Continuous>  PrismaSOL Filtration BGK 4 / 2.5 5000 milliLiter(s) CRRT <Continuous>    PRN Inpatient Medications  acetaminophen    Suspension .. 650 milliGRAM(s) Oral every 6 hours PRN  sodium chloride 0.9% lock flush 10 milliLiter(s) IV Push every 1 hour PRN      REVIEW OF SYSTEMS  --------------------------------------------------------------------------------  Gen: No fevers/chills  Skin: No rashes  Head/Eyes/Ears: Normal hearing, no difficulty seeing  Respiratory: No dyspnea, cough  CV: No chest pain  GI: No abdominal pain, diarrhea  : No dysuria, hematuria  MSK: No  edema  Heme: No easy bruising or bleeding  Psych: No significant depression    >>> <<<    VITALS/PHYSICAL EXAM  --------------------------------------------------------------------------------  T(C): 36.3 (12-04-20 @ 08:00), Max: 36.7 (12-04-20 @ 04:00)  HR: 101 (12-04-20 @ 08:45) (74 - 112)  BP: --  RR: 30 (12-04-20 @ 08:00) (30 - 35)  SpO2: 100% (12-04-20 @ 08:00) (90% - 100%)  Wt(kg): --        12-03-20 @ 07:01  -  12-04-20 @ 07:00  --------------------------------------------------------  IN: 2595.4 mL / OUT: 5453 mL / NET: -2857.6 mL      Physical Exam:    	Gen: NAD  	HEENT: Anicteric  	Pulm: CTA B/L  	CV: S1S2  	Abd: Soft, +BS   	MSK: No LE edema B/L  	Neuro: Awake  	Skin: Warm and dry  	Vascular access:      LABS/STUDIES  --------------------------------------------------------------------------------              8.9    41.28 >-----------<  235      [12-04-20 @ 00:41]              28.6     133  |  98  |  15  ----------------------------<  217      [12-04-20 @ 00:41]  4.8   |  20  |  0.77        Ca     9.1     [12-04-20 @ 00:41]      Mg     2.6     [12-04-20 @ 00:41]      Phos  2.6     [12-04-20 @ 00:41]    TPro  7.6  /  Alb  3.1  /  TBili  1.3  /  DBili  x   /  AST  30  /  ALT  12  /  AlkPhos  76  [12-04-20 @ 00:41]    PT/INR: PT 12.3 , INR 1.03       [12-03-20 @ 00:29]  PTT: 127.5      [12-04-20 @ 00:41]          [12-04-20 @ 00:41]    Creatinine Trend:  SCr 0.77 [12-04 @ 00:41]  SCr 0.83 [12-03 @ 17:40]  SCr 0.87 [12-03 @ 11:40]  SCr 0.92 [12-03 @ 07:10]  SCr 1.02 [12-03 @ 00:29]    Urinalysis - [11-26-20 @ 23:57]      Color Yellow / Appearance Slightly Turbid / SG 1.021 / pH 5.5      Gluc Negative / Ketone Negative  / Bili Negative / Urobili Negative       Blood Negative / Protein 30 mg/dL / Leuk Est Moderate / Nitrite Negative      RBC 3 / WBC 8 / Hyaline 4 / Gran  / Sq Epi  / Non Sq Epi 2 / Bacteria Occasional      Ferritin 1416      [12-04-20 @ 05:20]  HbA1c 6.6      [04-16-16 @ 06:00]  Lipid: chol --, , HDL --, LDL --      [11-30-20 @ 05:11]    HCV 0.10, Nonreact      [11-19-20 @ 09:32]     [facility] DIVISION OF KIDNEY DISEASES AND HYPERTENSION -- FOLLOW UP NOTE  --------------------------------------------------------------------------------  Shashank Murillo   Nephrology Fellow  Pager NS: [phone]/ [facility]: [phone]  (After 5 pm or on weekends please page the on-call fellow)      Patient is a 60y old  Female who presents with a chief complaint of Acute hypoxemic respiratory failure 2/2 covid19 (04 Dec 2020 07:12)      24 hour events/subjective: Patient seen and examined at the bedside. Vital signs, labs, medications reviewed. Tolerating CRRT well, no issues with clotting, UF 100cc/hr. Fio2 50%, PEEP 10. On low dose IV pressors        PAST HISTORY  --------------------------------------------------------------------------------  No significant changes to PMH, PSH, FHx, SHx, unless otherwise noted    ALLERGIES & MEDICATIONS  --------------------------------------------------------------------------------  Allergies    Kiwi (Unknown)  latex (Anaphylaxis)  latex (Unknown)  penicillin (Other)  penicillin (Unknown)  perfume  hives (Other)  potassium acetate (Other)  soap additives hives (Other)    Intolerances      Standing Inpatient Medications  ALBUTerol    90 MICROgram(s) HFA Inhaler 4 Puff(s) Inhalation every 6 hours  BACItracin   Ointment 1 Application(s) Topical two times a day  caspofungin IVPB      caspofungin IVPB 50 milliGRAM(s) IV Intermittent every 24 hours  ceFAZolin   IVPB 2000 milliGRAM(s) IV Intermittent every 8 hours  chlorhexidine 0.12% Liquid 15 milliLiter(s) Oral Mucosa every 12 hours  chlorhexidine 4% Liquid 1 Application(s) Topical <User Schedule>  chlorhexidine 4% Liquid 1 Application(s) Topical <User Schedule>  cisatracurium Infusion 3 MICROgram(s)/kG/Min IV Continuous <Continuous>  CRRT Treatment    <Continuous>  dextrose 40% Gel 15 Gram(s) Oral once  dextrose 5%. 1000 milliLiter(s) IV Continuous <Continuous>  dextrose 5%. 1000 milliLiter(s) IV Continuous <Continuous>  dextrose 50% Injectable 25 Gram(s) IV Push once  dextrose 50% Injectable 12.5 Gram(s) IV Push once  dextrose 50% Injectable 25 Gram(s) IV Push once  glucagon  Injectable 1 milliGRAM(s) IntraMuscular once  heparin  Infusion 1900 Unit(s)/Hr IV Continuous <Continuous>  insulin lispro (ADMELOG) corrective regimen sliding scale   SubCutaneous every 6 hours  insulin NPH human recombinant 10 Unit(s) SubCutaneous every 6 hours  ipratropium 17 MICROgram(s) HFA Inhaler 2 Puff(s) Inhalation every 6 hours  ketamine Infusion. 0.25 mG/kG/Hr IV Continuous <Continuous>  levoFLOXacin IVPB 500 milliGRAM(s) IV Intermittent every 24 hours  metoclopramide Injectable 10 milliGRAM(s) IV Push every 6 hours  midazolam Infusion 0.08 mG/kG/Hr IV Continuous <Continuous>  midodrine 10 milliGRAM(s) Oral every 8 hours  norepinephrine Infusion 0.05 MICROgram(s)/kG/Min IV Continuous <Continuous>  nystatin Powder 1 Application(s) Topical two times a day  pantoprazole  Injectable 40 milliGRAM(s) IV Push daily  petrolatum Ophthalmic Ointment 1 Application(s) Both EYES two times a day  polyethylene glycol 3350 17 Gram(s) Oral every 12 hours  PrismaSATE Dialysate BGK 4 / 2.5 5000 milliLiter(s) CRRT <Continuous>  PrismaSOL Filtration BGK 0 / 2.5 5000 milliLiter(s) CRRT <Continuous>  PrismaSOL Filtration BGK 4 / 2.5 5000 milliLiter(s) CRRT <Continuous>    PRN Inpatient Medications  acetaminophen    Suspension .. 650 milliGRAM(s) Oral every 6 hours PRN  sodium chloride 0.9% lock flush 10 milliLiter(s) IV Push every 1 hour PRN      REVIEW OF SYSTEMS  --------------------------------------------------------------------------------  Unable to assess    >>> <<<    VITALS/PHYSICAL EXAM  --------------------------------------------------------------------------------  T(C): 36.3 (12-04-20 @ 08:00), Max: 36.7 (12-04-20 @ 04:00)  HR: 101 (12-04-20 @ 08:45) (74 - 112)  BP: --  RR: 30 (12-04-20 @ 08:00) (30 - 35)  SpO2: 100% (12-04-20 @ 08:00) (90% - 100%)  Wt(kg): --        12-03-20 @ 07:01  -  12-04-20 @ 07:00  --------------------------------------------------------  IN: 2595.4 mL / OUT: 5453 mL / NET: -2857.6 mL      Physical Exam deferred 2/2 COVID19 and PPE preservation      LABS/STUDIES  --------------------------------------------------------------------------------              8.9    41.28 >-----------<  235      [12-04-20 @ 00:41]              28.6     133  |  98  |  15  ----------------------------<  217      [12-04-20 @ 00:41]  4.8   |  20  |  0.77        Ca     9.1     [12-04-20 @ 00:41]      Mg     2.6     [12-04-20 @ 00:41]      Phos  2.6     [12-04-20 @ 00:41]    TPro  7.6  /  Alb  3.1  /  TBili  1.3  /  DBili  x   /  AST  30  /  ALT  12  /  AlkPhos  76  [12-04-20 @ 00:41]    PT/INR: PT 12.3 , INR 1.03       [12-03-20 @ 00:29]  PTT: 127.5      [12-04-20 @ 00:41]          [12-04-20 @ 00:41]    Creatinine Trend:  SCr 0.77 [12-04 @ 00:41]  SCr 0.83 [12-03 @ 17:40]  SCr 0.87 [12-03 @ 11:40]  SCr 0.92 [12-03 @ 07:10]  SCr 1.02 [12-03 @ 00:29]    Urinalysis - [11-26-20 @ 23:57]      Color Yellow / Appearance Slightly Turbid / SG 1.021 / pH 5.5      Gluc Negative / Ketone Negative  / Bili Negative / Urobili Negative       Blood Negative / Protein 30 mg/dL / Leuk Est Moderate / Nitrite Negative      RBC 3 / WBC 8 / Hyaline 4 / Gran  / Sq Epi  / Non Sq Epi 2 / Bacteria Occasional      Ferritin 1416      [12-04-20 @ 05:20]  HbA1c 6.6      [04-16-16 @ 06:00]  Lipid: chol --, , HDL --, LDL --      [11-30-20 @ 05:11]    HCV 0.10, Nonreact      [11-19-20 @ 09:32]

## 2020-12-04 NOTE — PROGRESS NOTE ADULT - SUBJECTIVE AND OBJECTIVE BOX
Medicine Progress Note    Patient is a 60y old  Female who presents with a chief complaint of Acute hypoxemic respiratory failure 2/2 covid19 (03 Dec 2020 16:32)      SUBJECTIVE / OVERNIGHT EVENTS:    ADDITIONAL REVIEW OF SYSTEMS:    MEDICATIONS  (STANDING):  ALBUTerol    90 MICROgram(s) HFA Inhaler 4 Puff(s) Inhalation every 6 hours  BACItracin   Ointment 1 Application(s) Topical two times a day  caspofungin IVPB      caspofungin IVPB 50 milliGRAM(s) IV Intermittent every 24 hours  ceFAZolin   IVPB 2000 milliGRAM(s) IV Intermittent every 8 hours  chlorhexidine 0.12% Liquid 15 milliLiter(s) Oral Mucosa every 12 hours  chlorhexidine 4% Liquid 1 Application(s) Topical <User Schedule>  chlorhexidine 4% Liquid 1 Application(s) Topical <User Schedule>  cisatracurium Infusion 3 MICROgram(s)/kG/Min (24.5 mL/Hr) IV Continuous <Continuous>  CRRT Treatment    <Continuous>  dextrose 40% Gel 15 Gram(s) Oral once  dextrose 5%. 1000 milliLiter(s) (50 mL/Hr) IV Continuous <Continuous>  dextrose 5%. 1000 milliLiter(s) (100 mL/Hr) IV Continuous <Continuous>  dextrose 50% Injectable 25 Gram(s) IV Push once  dextrose 50% Injectable 12.5 Gram(s) IV Push once  dextrose 50% Injectable 25 Gram(s) IV Push once  glucagon  Injectable 1 milliGRAM(s) IntraMuscular once  heparin  Infusion 1900 Unit(s)/Hr (19 mL/Hr) IV Continuous <Continuous>  insulin lispro (ADMELOG) corrective regimen sliding scale   SubCutaneous every 6 hours  insulin NPH human recombinant 10 Unit(s) SubCutaneous every 6 hours  ipratropium 17 MICROgram(s) HFA Inhaler 2 Puff(s) Inhalation every 6 hours  ketamine Infusion. 0.25 mG/kG/Hr (3.4 mL/Hr) IV Continuous <Continuous>  levoFLOXacin IVPB 500 milliGRAM(s) IV Intermittent every 24 hours  metoclopramide Injectable 10 milliGRAM(s) IV Push every 6 hours  midazolam Infusion 0.08 mG/kG/Hr (10.9 mL/Hr) IV Continuous <Continuous>  midodrine 10 milliGRAM(s) Oral every 8 hours  norepinephrine Infusion 0.05 MICROgram(s)/kG/Min (12.8 mL/Hr) IV Continuous <Continuous>  nystatin Powder 1 Application(s) Topical two times a day  pantoprazole  Injectable 40 milliGRAM(s) IV Push daily  petrolatum Ophthalmic Ointment 1 Application(s) Both EYES two times a day  polyethylene glycol 3350 17 Gram(s) Oral every 12 hours  PrismaSATE Dialysate BGK 4 / 2.5 5000 milliLiter(s) (3000 mL/Hr) CRRT <Continuous>  PrismaSOL Filtration BGK 0 / 2.5 5000 milliLiter(s) (200 mL/Hr) CRRT <Continuous>  PrismaSOL Filtration BGK 4 / 2.5 5000 milliLiter(s) (1000 mL/Hr) CRRT <Continuous>    MEDICATIONS  (PRN):  acetaminophen    Suspension .. 650 milliGRAM(s) Oral every 6 hours PRN Temp greater or equal to 38C (100.4F)  sodium chloride 0.9% lock flush 10 milliLiter(s) IV Push every 1 hour PRN Pre/post blood products, medications, blood draw, and to maintain line patency    CAPILLARY BLOOD GLUCOSE      POCT Blood Glucose.: 165 mg/dL (04 Dec 2020 05:29)  POCT Blood Glucose.: 207 mg/dL (03 Dec 2020 23:44)    I&O's Summary    03 Dec 2020 07:01  -  04 Dec 2020 07:00  --------------------------------------------------------  IN: 2595.4 mL / OUT: 5453 mL / NET: -2857.6 mL        PHYSICAL EXAM:  Vital Signs Last 24 Hrs  T(C): 36.7 (04 Dec 2020 04:00), Max: 36.7 (04 Dec 2020 04:00)  T(F): 98.1 (04 Dec 2020 04:00), Max: 98.1 (04 Dec 2020 04:00)  HR: 100 (04 Dec 2020 07:00) (74 - 112)  BP: --  BP(mean): --  RR: 30 (04 Dec 2020 07:00) (30 - 35)  SpO2: 100% (04 Dec 2020 07:00) (90% - 100%)  CONSTITUTIONAL: NAD, well-developed, well-groomed  ENMT: Moist oral mucosa, no pharyngeal injection or exudates; normal dentition  RESPIRATORY: Normal respiratory effort; lungs are clear to auscultation bilaterally  CARDIOVASCULAR: Regular rate and rhythm, normal S1 and S2, no murmur/rub/gallop; No lower extremity edema; Peripheral pulses are 2+ bilaterally  ABDOMEN: Nontender to palpation, normoactive bowel sounds, no rebound/guarding; No hepatosplenomegaly  PSYCH: A+O to person, place, and time; affect appropriate  NEUROLOGY: CN 2-12 are intact and symmetric; no gross sensory deficits   SKIN: No rashes; no palpable lesions    LABS:                        8.9    41.28 )-----------( 235      ( 04 Dec 2020 00:41 )             28.6     12-04    133<L>  |  98  |  15  ----------------------------<  217<H>  4.8   |  20<L>  |  0.77    Ca    9.1      04 Dec 2020 00:41  Phos  2.6     12-04  Mg     2.6     12-04    TPro  7.6  /  Alb  3.1<L>  /  TBili  1.3<H>  /  DBili  x   /  AST  30  /  ALT  12  /  AlkPhos  76  12-04    PT/INR - ( 03 Dec 2020 00:29 )   PT: 12.3 sec;   INR: 1.03 ratio         PTT - ( 04 Dec 2020 00:41 )  PTT:127.5 sec          Culture - Sputum (collected 01 Dec 2020 08:55)  Source: .Sputum Sputum  Gram Stain (01 Dec 2020 14:59):    Moderate Squamous epithelial cells per low power field    No polymorphonuclear leukocytes per low power field    Few Gram Variable Rods per oil power field    Rare Gram positive cocci in pairs per oil power field  Final Report (03 Dec 2020 10:18):    Moderate Stenotrophomonas maltophilia    Normal Respiratory Jocelynn present  Organism: Stenotrophomonas maltophilia (03 Dec 2020 10:18)  Organism: Stenotrophomonas maltophilia (03 Dec 2020 10:18)      COVID-19 PCR: Detected (01 Dec 2020 17:41)  SARS-CoV-2: Detected (17 Nov 2020 22:56)      RADIOLOGY & ADDITIONAL TESTS:  Imaging from Last 24 Hours:    Electrocardiogram/QTc Interval:    COORDINATION OF CARE:  Care Discussed with Consultants/Other Providers:   Medicine Progress Note    HPI:  60F w/pmhx AARON, HTN, 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 Reports that her  and son live with her and they have tested so far negative for covid and are asymptomatic, patient is not sure where was exposed. Reports trying her symbicort this week for shortness of breath without much relief. Patient yesterday began to have fevers with highest fever at 104F which alarmed her and for that reason she came to the hospital for help  (18 Nov 2020 05:46). Pt found to be COVID + and completed remdesivir 11/18-11/22. On 11/23 pt became hypoxic w/ increasign WOB and was intubated for airway protection and transferred to the COVID ICU. The patient was also proned that night for 18 hours. Hosp course was complicated by ATN which was unresponsive to bumex challenge. On 11/27 the pt was consented for CVVHDF. Her course was also complicated by MSSA/P. mirablis bacteremia potentially due to urine vs line-associated and MSSA in sputum due to PNA. Pt on antibiotics and antifungals for WBC in the 40s.     SUBJECTIVE / OVERNIGHT EVENTS: No overnight events. Pt proned 20 hours with improved in pO2. Now supine and FiO2 reduced to 60%    ADDITIONAL REVIEW OF SYSTEMS: Unable to attain. Pt sedated.    MEDICATIONS  (STANDING):  ALBUTerol    90 MICROgram(s) HFA Inhaler 4 Puff(s) Inhalation every 6 hours  BACItracin   Ointment 1 Application(s) Topical two times a day  caspofungin IVPB      caspofungin IVPB 50 milliGRAM(s) IV Intermittent every 24 hours  ceFAZolin   IVPB 2000 milliGRAM(s) IV Intermittent every 8 hours  chlorhexidine 0.12% Liquid 15 milliLiter(s) Oral Mucosa every 12 hours  chlorhexidine 4% Liquid 1 Application(s) Topical <User Schedule>  chlorhexidine 4% Liquid 1 Application(s) Topical <User Schedule>  cisatracurium Infusion 3 MICROgram(s)/kG/Min (24.5 mL/Hr) IV Continuous <Continuous>  CRRT Treatment    <Continuous>  dextrose 40% Gel 15 Gram(s) Oral once  dextrose 5%. 1000 milliLiter(s) (50 mL/Hr) IV Continuous <Continuous>  dextrose 5%. 1000 milliLiter(s) (100 mL/Hr) IV Continuous <Continuous>  dextrose 50% Injectable 25 Gram(s) IV Push once  dextrose 50% Injectable 12.5 Gram(s) IV Push once  dextrose 50% Injectable 25 Gram(s) IV Push once  glucagon  Injectable 1 milliGRAM(s) IntraMuscular once  heparin  Infusion 1900 Unit(s)/Hr (19 mL/Hr) IV Continuous <Continuous>  insulin lispro (ADMELOG) corrective regimen sliding scale   SubCutaneous every 6 hours  insulin NPH human recombinant 10 Unit(s) SubCutaneous every 6 hours  ipratropium 17 MICROgram(s) HFA Inhaler 2 Puff(s) Inhalation every 6 hours  ketamine Infusion. 0.25 mG/kG/Hr (3.4 mL/Hr) IV Continuous <Continuous>  levoFLOXacin IVPB 500 milliGRAM(s) IV Intermittent every 24 hours  metoclopramide Injectable 10 milliGRAM(s) IV Push every 6 hours  midazolam Infusion 0.08 mG/kG/Hr (10.9 mL/Hr) IV Continuous <Continuous>  midodrine 10 milliGRAM(s) Oral every 8 hours  norepinephrine Infusion 0.05 MICROgram(s)/kG/Min (12.8 mL/Hr) IV Continuous <Continuous>  nystatin Powder 1 Application(s) Topical two times a day  pantoprazole  Injectable 40 milliGRAM(s) IV Push daily  petrolatum Ophthalmic Ointment 1 Application(s) Both EYES two times a day  polyethylene glycol 3350 17 Gram(s) Oral every 12 hours  PrismaSATE Dialysate BGK 4 / 2.5 5000 milliLiter(s) (3000 mL/Hr) CRRT <Continuous>  PrismaSOL Filtration BGK 0 / 2.5 5000 milliLiter(s) (200 mL/Hr) CRRT <Continuous>  PrismaSOL Filtration BGK 4 / 2.5 5000 milliLiter(s) (1000 mL/Hr) CRRT <Continuous>    MEDICATIONS  (PRN):  acetaminophen    Suspension .. 650 milliGRAM(s) Oral every 6 hours PRN Temp greater or equal to 38C (100.4F)  sodium chloride 0.9% lock flush 10 milliLiter(s) IV Push every 1 hour PRN Pre/post blood products, medications, blood draw, and to maintain line patency    CAPILLARY BLOOD GLUCOSE    POCT Blood Glucose.: 165 mg/dL (04 Dec 2020 05:29)  POCT Blood Glucose.: 207 mg/dL (03 Dec 2020 23:44)    I&O's Summary    03 Dec 2020 07:01  -  04 Dec 2020 07:00  --------------------------------------------------------  IN: 2595.4 mL / OUT: 5453 mL / NET: -2857.6 mL    PHYSICAL EXAM:  Vital Signs Last 24 Hrs  T(C): 36.7 (04 Dec 2020 04:00), Max: 36.7 (04 Dec 2020 04:00)  T(F): 98.1 (04 Dec 2020 04:00), Max: 98.1 (04 Dec 2020 04:00)  HR: 100 (04 Dec 2020 07:00) (74 - 112)  RR: 30 (04 Dec 2020 07:00) (30 - 35)  SpO2: 100% (04 Dec 2020 07:00) (90% - 100%)  CONSTITUTIONAL: NAD, well-developed, well-groomed  ENMT: Moist oral mucosa, no pharyngeal injection or exudates; normal dentition  RESPIRATORY: Course and reduced breath sounds, intubated  CARDIOVASCULAR: Regular rate and rhythm, normal S1 and S2, no murmur/rub/gallop; No lower extremity edema; Peripheral pulses are 2+ bilaterally  ABDOMEN: Nontender to palpation, normoactive bowel sounds, no rebound/guarding; No hepatosplenomegaly. Abdomen obese.     LABS:                        8.9    41.28 )-----------( 235      ( 04 Dec 2020 00:41 )             28.6     12-04    133<L>  |  98  |  15  ----------------------------<  217<H>  4.8   |  20<L>  |  0.77    Ca    9.1      04 Dec 2020 00:41  Phos  2.6     12-04  Mg     2.6     12-04    TPro  7.6  /  Alb  3.1<L>  /  TBili  1.3<H>  /  DBili  x   /  AST  30  /  ALT  12  /  AlkPhos  76  12-04    PT/INR - ( 03 Dec 2020 00:29 )   PT: 12.3 sec;   INR: 1.03 ratio      PTT - ( 04 Dec 2020 00:41 )  PTT:127.5 sec    Culture - Sputum (collected 01 Dec 2020 08:55)  Source: .Sputum Sputum  Gram Stain (01 Dec 2020 14:59):    Moderate Squamous epithelial cells per low power field    No polymorphonuclear leukocytes per low power field    Few Gram Variable Rods per oil power field    Rare Gram positive cocci in pairs per oil power field  Final Report (03 Dec 2020 10:18):    Moderate Stenotrophomonas maltophilia    Normal Respiratory Jocelynn present  Organism: Stenotrophomonas maltophilia (03 Dec 2020 10:18)  Organism: Stenotrophomonas maltophilia (03 Dec 2020 10:18)    COVID-19 PCR: Detected (01 Dec 2020 17:41)  SARS-CoV-2: Detected (17 Nov 2020 22:56)    RADIOLOGY & ADDITIONAL TESTS:  Imaging from Last 24 Hours: CT C/A/P pending    Electrocardiogram/QTc Interval:  QTc 460    COORDINATION OF CARE:  Care Discussed with Consultants/Other Providers: Care discussed with infectious disease

## 2020-12-04 NOTE — PROGRESS NOTE ADULT - SUBJECTIVE AND OBJECTIVE BOX
Follow Up:  COVID19, MSSA Bacteremia, Proteus Bacteremia, Stenotrophomonas Sputum Cx    Interval History:    REVIEW OF SYSTEMS  [  ] ROS unobtainable because:    [  ] All other systems negative except as noted below:     Constitutional:  [ ] fever [ ] chills  [ ] weight loss  [ ] weakness  Skin:  [ ] rash [ ] phlebitis	  Eyes: [ ] icterus [ ] pain  [ ] discharge	  ENMT: [ ] sore throat  [ ] thrush [ ] ulcers [ ] exudates  Respiratory: [  ] dyspnea [ ] hemoptysis [ ] cough [ ] sputum	  Cardiovascular:  [ ] chest pain [ ] palpitations [ ] edema	  Gastrointestinal:  [ ] nausea [ ] vomiting [ ] diarrhea [ ] constipation [ ] pain	  Genitourinary:  [ ] dysuria [ ] frequency [ ] hematuria [ ] discharge [ ] flank pain  [ ] incontinence  Musculoskeletal:  [ ] myalgias [ ] arthralgias [ ] arthritis  [ ] back pain  Neurological:  [ ] headache [ ] seizures  [ ] confusion/altered mental status    Allergies  Kiwi (Unknown)  latex (Anaphylaxis)  latex (Unknown)  penicillin (Other)  penicillin (Unknown)  perfume  hives (Other)  potassium acetate (Other)  soap additives hives (Other)        ANTIMICROBIALS:  caspofungin IVPB    caspofungin IVPB 50 every 24 hours  ceFAZolin   IVPB 2000 every 8 hours  levoFLOXacin IVPB 500 every 24 hours      OTHER MEDS:  MEDICATIONS  (STANDING):  acetaminophen    Suspension .. 650 every 6 hours PRN  ALBUTerol    90 MICROgram(s) HFA Inhaler 4 every 6 hours  cisatracurium Infusion 3 <Continuous>  dextrose 40% Gel 15 once  dextrose 50% Injectable 25 once  dextrose 50% Injectable 12.5 once  dextrose 50% Injectable 25 once  fentaNYL   Infusion.. 1 <Continuous>  glucagon  Injectable 1 once  heparin  Infusion 1900 <Continuous>  insulin lispro (ADMELOG) corrective regimen sliding scale  every 6 hours  insulin NPH human recombinant 10 every 6 hours  ipratropium 17 MICROgram(s) HFA Inhaler 2 every 6 hours  ketamine Infusion. 0.25 <Continuous>  metoclopramide Injectable 10 every 6 hours  midazolam Infusion 0.08 <Continuous>  midodrine 10 every 8 hours  norepinephrine Infusion 0.05 <Continuous>  pantoprazole  Injectable 40 daily  polyethylene glycol 3350 17 every 12 hours      Vital Signs Last 24 Hrs  T(C): 36.8 (04 Dec 2020 17:15), Max: 36.9 (04 Dec 2020 15:00)  T(F): 98.2 (04 Dec 2020 17:15), Max: 98.4 (04 Dec 2020 15:00)  HR: 105 (04 Dec 2020 17:30) (69 - 119)  BP: --  BP(mean): --  RR: 30 (04 Dec 2020 17:15) (30 - 41)  SpO2: 97% (04 Dec 2020 17:15) (89% - 100%)    PHYSICAL EXAMINATION:    General: Alert and Awake, NAD  HEENT: PERRL, EOMI  Neck: Supple  Cardiac: RRR, No M/R/G  Resp: CTAB, No Wh/Rh/Ra  Abdomen: NBS, NT/ND, No HSM, No rigidity or guarding  MSK: No LE edema. No Calf tenderness  : No negro  Skin: No rashes or lesions. Skin is warm and dry to the touch.   Neuro: Alert and Awake. CN 2-12 Grossly intact. Moves all four extremities spontaneously.  Psych: Calm, Pleasant, Cooperative    LABORATORY:                          8.5    50.91 )-----------( 276      ( 04 Dec 2020 16:21 )             28.2       12-04    135  |  99  |  16  ----------------------------<  189<H>  4.2   |  21<L>  |  0.81    Ca    8.8      04 Dec 2020 16:21  Phos  2.9     12-04  Mg     2.6     12-04    TPro  7.2  /  Alb  3.1<L>  /  TBili  1.3<H>  /  DBili  x   /  AST  85<H>  /  ALT  31  /  AlkPhos  82  12-04          C-Reactive Protein, Serum: 17.22 mg/dL (12-04-20 @ 00:41)  C-Reactive Protein, Serum: 20.99 mg/dL (12-03-20 @ 00:29)  C-Reactive Protein, Serum: 25.79 mg/dL (12-02-20 @ 00:17)      Ferritin, Serum: 1416 ng/mL (12-04-20 @ 05:20)  Ferritin, Serum: 1257 ng/mL (12-03-20 @ 02:56)  Ferritin, Serum: 1309 ng/mL (12-02-20 @ 03:52)      D-Dimer Assay, Quantitative: 2006 ng/mL DDU (12-04-20 @ 00:41)  D-Dimer Assay, Quantitative: 2017 ng/mL DDU (12-03-20 @ 00:29)  D-Dimer Assay, Quantitative: 2023 ng/mL DDU (12-02-20 @ 00:17)          MICROBIOLOGY:  v  .Sputum Sputum  12-01-20   Moderate Stenotrophomonas maltophilia  Normal Respiratory Jocelynn present  --  Stenotrophomonas maltophilia      .Blood Blood-Peripheral  12-01-20   No growth to date.  --  --      .Urine Clean Catch (Midstream)  11-30-20   <10,000 CFU/mL Normal Urogenital Jocelynn  --  --      .Blood Blood-Peripheral  11-28-20   Growth in aerobic and anaerobic bottles: Staphylococcus aureus See  previous culture [id number]  --    Growth in aerobic bottle: Gram Positive Cocci in Clusters  Growth in anaerobic bottle: Gram Positive Cocci in Clusters      .Blood Blood-Peripheral  11-27-20   Growth in aerobic and anaerobic bottles: Staphylococcus aureus  Growth in anaerobic bottle: Staphylococcus hominis  Coag Negative Staphylococcus  Single set isolate, possible contaminant. Contact  Microbiology if susceptibility testing clinically  indicated.  ***Blood Panel PCR results on this specimen are available  approximately 3 hours after the Gram stain result.***  Gram stain, PCR, and/or culture results may not always  correspond due to difference in methodologies.  ************************************************************  This PCR assay was performed using [employer].  The following targets are tested for: Enterococcus,  vancomycin resistant enterococci, Listeria monocytogenes,  coagulase negative staphylococci, S. aureus,  methicillin resistant S. aureus, Streptococcus agalactiae  (Group B), S. pneumoniae, S. pyogenes (Group A),  Acinetobacter baumannii, Enterobacter cloacae, E. coli,  Klebsiella oxytoca, K. pneumoniae, Proteus sp.,  Serratia marcescens, Haemophilus influenzae,  Neisseria meningitidis, Pseudomonas aeruginosa, Candida  albicans, C. glabrata, C krusei, C parapsilosis,  C. tropicalis and the KPC resistance gene.  --  Blood Culture PCR  Staphylococcus aureus      .Sputum  11-25-20   Numerous Staphylococcus aureus  Normal Respiratory Jocelynn absent  --  Staphylococcus aureus      .Sputum Sputum  11-24-20   Moderate Staphylococcus aureus  Normal Respiratory Jocelynn absent  --  Staphylococcus aureus      .Blood Blood-Peripheral  11-19-20   No Growth Final  --  --                RADIOLOGY:    <The imaging below has been reviewed and visualized by me independently. Findings as detailed in report below> Follow Up:  COVID19, MSSA Bacteremia, Proteus Bacteremia, Stenotrophomonas Sputum Cx    Interval History: afebrile overnight. remains on vasopressors. remains intubated. per RN remains diarrhea.     REVIEW OF SYSTEMS  [  x] ROS unobtainable because:  intubated and sedated  [  ] All other systems negative except as noted below:     Constitutional:  [ ] fever [ ] chills  [ ] weight loss  [ ] weakness  Skin:  [ ] rash [ ] phlebitis	  Eyes: [ ] icterus [ ] pain  [ ] discharge	  ENMT: [ ] sore throat  [ ] thrush [ ] ulcers [ ] exudates  Respiratory: [  ] dyspnea [ ] hemoptysis [ ] cough [ ] sputum	  Cardiovascular:  [ ] chest pain [ ] palpitations [ ] edema	  Gastrointestinal:  [ ] nausea [ ] vomiting [ ] diarrhea [ ] constipation [ ] pain	  Genitourinary:  [ ] dysuria [ ] frequency [ ] hematuria [ ] discharge [ ] flank pain  [ ] incontinence  Musculoskeletal:  [ ] myalgias [ ] arthralgias [ ] arthritis  [ ] back pain  Neurological:  [ ] headache [ ] seizures  [ ] confusion/altered mental status      Allergies  Kiwi (Unknown)  latex (Anaphylaxis)  latex (Unknown)  penicillin (Other)  penicillin (Unknown)  perfume  hives (Other)  potassium acetate (Other)  soap additives hives (Other)        ANTIMICROBIALS:  caspofungin IVPB    caspofungin IVPB 50 every 24 hours  ceFAZolin   IVPB 2000 every 8 hours  levoFLOXacin IVPB 500 every 24 hours      OTHER MEDS:  MEDICATIONS  (STANDING):  acetaminophen    Suspension .. 650 every 6 hours PRN  ALBUTerol    90 MICROgram(s) HFA Inhaler 4 every 6 hours  cisatracurium Infusion 3 <Continuous>  dextrose 40% Gel 15 once  dextrose 50% Injectable 25 once  dextrose 50% Injectable 12.5 once  dextrose 50% Injectable 25 once  fentaNYL   Infusion.. 1 <Continuous>  glucagon  Injectable 1 once  heparin  Infusion 1900 <Continuous>  insulin lispro (ADMELOG) corrective regimen sliding scale  every 6 hours  insulin NPH human recombinant 10 every 6 hours  ipratropium 17 MICROgram(s) HFA Inhaler 2 every 6 hours  ketamine Infusion. 0.25 <Continuous>  metoclopramide Injectable 10 every 6 hours  midazolam Infusion 0.08 <Continuous>  midodrine 10 every 8 hours  norepinephrine Infusion 0.05 <Continuous>  pantoprazole  Injectable 40 daily  polyethylene glycol 3350 17 every 12 hours      Vital Signs Last 24 Hrs  T(C): 36.8 (04 Dec 2020 17:15), Max: 36.9 (04 Dec 2020 15:00)  T(F): 98.2 (04 Dec 2020 17:15), Max: 98.4 (04 Dec 2020 15:00)  HR: 105 (04 Dec 2020 17:30) (69 - 119)  BP: --  BP(mean): --  RR: 30 (04 Dec 2020 17:15) (30 - 41)  SpO2: 97% (04 Dec 2020 17:15) (89% - 100%)    PHYSICAL EXAMINATION:  General: Intubated and Sedated  HEENT: +ETT  Neck: Supple  Cardiac: RRR, No M/R/G  Resp: CTAB, No Wh/Rh/Ra  Abdomen: NBS, NT/ND, No HSM, No rigidity or guarding  MSK: No LE edema. No Calf tenderness  : Rivera  Skin: No rashes or lesions. Skin is warm and dry to the touch.   Vascular: [facility] and [facility] (No surrounding erythema, drainage or tenderness to palpation)  Neuro: Intubated and Sedated  Psych: Unable to assess - intubated and sedated    LABORATORY:                          8.5    50.91 )-----------( 276      ( 04 Dec 2020 16:21 )             28.2       12-04    135  |  99  |  16  ----------------------------<  189<H>  4.2   |  21<L>  |  0.81    Ca    8.8      04 Dec 2020 16:21  Phos  2.9     12-04  Mg     2.6     12-04    TPro  7.2  /  Alb  3.1<L>  /  TBili  1.3<H>  /  DBili  x   /  AST  85<H>  /  ALT  31  /  AlkPhos  82  12-04    C-Reactive Protein, Serum: 17.22 mg/dL (12-04-20 @ 00:41)  C-Reactive Protein, Serum: 20.99 mg/dL (12-03-20 @ 00:29)  C-Reactive Protein, Serum: 25.79 mg/dL (12-02-20 @ 00:17)    Ferritin, Serum: 1416 ng/mL (12-04-20 @ 05:20)  Ferritin, Serum: 1257 ng/mL (12-03-20 @ 02:56)  Ferritin, Serum: 1309 ng/mL (12-02-20 @ 03:52)    D-Dimer Assay, Quantitative: 2006 ng/mL DDU (12-04-20 @ 00:41)  D-Dimer Assay, Quantitative: 2017 ng/mL DDU (12-03-20 @ 00:29)  D-Dimer Assay, Quantitative: 2023 ng/mL DDU (12-02-20 @ 00:17)    MICROBIOLOGY:    .Sputum Sputum  12-01-20   Moderate Stenotrophomonas maltophilia  Normal Respiratory Jocelynn present  --  Stenotrophomonas maltophilia    .Blood Blood-Peripheral  12-01-20   No growth to date.  --  --      .Urine Clean Catch (Midstream)  11-30-20   <10,000 CFU/mL Normal Urogenital Jocelynn  --  --      .Blood Blood-Peripheral  11-28-20   Growth in aerobic and anaerobic bottles: Staphylococcus aureus See  previous culture [id number]  --    Growth in aerobic bottle: Gram Positive Cocci in Clusters  Growth in anaerobic bottle: Gram Positive Cocci in Clusters      .Blood Blood-Peripheral  11-27-20   Growth in aerobic and anaerobic bottles: Staphylococcus aureus  Growth in anaerobic bottle: Staphylococcus hominis  Coag Negative Staphylococcus  Single set isolate, possible contaminant. Contact  Microbiology if susceptibility testing clinically  indicated.  ***Blood Panel PCR results on this specimen are available  approximately 3 hours after the Gram stain result.***  Gram stain, PCR, and/or culture results may not always  correspond due to difference in methodologies.  ************************************************************  This PCR assay was performed using [employer].  The following targets are tested for: Enterococcus,  vancomycin resistant enterococci, Listeria monocytogenes,  coagulase negative staphylococci, S. aureus,  methicillin resistant S. aureus, Streptococcus agalactiae  (Group B), S. pneumoniae, S. pyogenes (Group A),  Acinetobacter baumannii, Enterobacter cloacae, E. coli,  Klebsiella oxytoca, K. pneumoniae, Proteus sp.,  Serratia marcescens, Haemophilus influenzae,  Neisseria meningitidis, Pseudomonas aeruginosa, Candida  albicans, C. glabrata, C krusei, C parapsilosis,  C. tropicalis and the KPC resistance gene.  --  Blood Culture PCR  Staphylococcus aureus      .Sputum  11-25-20   Numerous Staphylococcus aureus  Normal Respiratory Jocelynn absent  --  Staphylococcus aureus      .Sputum Sputum  11-24-20   Moderate Staphylococcus aureus  Normal Respiratory Jocelynn absent  --  Staphylococcus aureus      .Blood Blood-Peripheral  11-19-20   No Growth Final  --  --    RADIOLOGY:    <The imaging below has been reviewed and visualized by me independently. Findings as detailed in report below>    EXAM:  XR CHEST AP OR PA 1V                        PROCEDURE DATE:  12/04/2020    Progression of right infiltrate.

## 2020-12-04 NOTE — PROGRESS NOTE ADULT - ATTENDING COMMENTS
Prone-->supine ventilation  1.  ARF--CRRT requiring but UF benefiting 2.  Excellent clearances and filter parameters reviewed with primary RN  2.  Respiratory failure, acute--volume optimization to keep FIO2<60%

## 2020-12-05 LAB
ALBUMIN SERPL ELPH-MCNC: 2.7 G/DL — LOW (ref 3.3–5)
ALBUMIN SERPL ELPH-MCNC: 2.8 G/DL — LOW (ref 3.3–5)
ALP SERPL-CCNC: 75 U/L — SIGNIFICANT CHANGE UP (ref 40–120)
ALP SERPL-CCNC: 79 U/L — SIGNIFICANT CHANGE UP (ref 40–120)
ALT FLD-CCNC: 18 U/L — SIGNIFICANT CHANGE UP (ref 10–45)
ALT FLD-CCNC: 25 U/L — SIGNIFICANT CHANGE UP (ref 10–45)
ANION GAP SERPL CALC-SCNC: 11 MMOL/L — SIGNIFICANT CHANGE UP (ref 5–17)
ANION GAP SERPL CALC-SCNC: 14 MMOL/L — SIGNIFICANT CHANGE UP (ref 5–17)
ANION GAP SERPL CALC-SCNC: 9 MMOL/L — SIGNIFICANT CHANGE UP (ref 5–17)
APTT BLD: 80.3 SEC — HIGH (ref 27.5–35.5)
AST SERPL-CCNC: 49 U/L — HIGH (ref 10–40)
AST SERPL-CCNC: 65 U/L — HIGH (ref 10–40)
BILIRUB SERPL-MCNC: 1 MG/DL — SIGNIFICANT CHANGE UP (ref 0.2–1.2)
BILIRUB SERPL-MCNC: 1.2 MG/DL — SIGNIFICANT CHANGE UP (ref 0.2–1.2)
BUN SERPL-MCNC: 13 MG/DL — SIGNIFICANT CHANGE UP (ref 7–23)
BUN SERPL-MCNC: 16 MG/DL — SIGNIFICANT CHANGE UP (ref 7–23)
BUN SERPL-MCNC: 19 MG/DL — SIGNIFICANT CHANGE UP (ref 7–23)
CALCIUM SERPL-MCNC: 8.5 MG/DL — SIGNIFICANT CHANGE UP (ref 8.4–10.5)
CALCIUM SERPL-MCNC: 8.7 MG/DL — SIGNIFICANT CHANGE UP (ref 8.4–10.5)
CALCIUM SERPL-MCNC: 8.8 MG/DL — SIGNIFICANT CHANGE UP (ref 8.4–10.5)
CHLORIDE SERPL-SCNC: 101 MMOL/L — SIGNIFICANT CHANGE UP (ref 96–108)
CHLORIDE SERPL-SCNC: 101 MMOL/L — SIGNIFICANT CHANGE UP (ref 96–108)
CHLORIDE SERPL-SCNC: 98 MMOL/L — SIGNIFICANT CHANGE UP (ref 96–108)
CO2 SERPL-SCNC: 21 MMOL/L — LOW (ref 22–31)
CO2 SERPL-SCNC: 23 MMOL/L — SIGNIFICANT CHANGE UP (ref 22–31)
CO2 SERPL-SCNC: 25 MMOL/L — SIGNIFICANT CHANGE UP (ref 22–31)
CREAT SERPL-MCNC: 0.8 MG/DL — SIGNIFICANT CHANGE UP (ref 0.5–1.3)
CREAT SERPL-MCNC: 0.81 MG/DL — SIGNIFICANT CHANGE UP (ref 0.5–1.3)
CREAT SERPL-MCNC: 0.95 MG/DL — SIGNIFICANT CHANGE UP (ref 0.5–1.3)
CRP SERPL-MCNC: 15.05 MG/DL — HIGH (ref 0–0.4)
D DIMER BLD IA.RAPID-MCNC: 2641 NG/ML DDU — HIGH
FERRITIN SERPL-MCNC: 1879 NG/ML — HIGH (ref 15–150)
GAS PNL BLDA: SIGNIFICANT CHANGE UP
GAS PNL BLDA: SIGNIFICANT CHANGE UP
GLUCOSE BLDC GLUCOMTR-MCNC: 156 MG/DL — HIGH (ref 70–99)
GLUCOSE BLDC GLUCOMTR-MCNC: 163 MG/DL — HIGH (ref 70–99)
GLUCOSE BLDC GLUCOMTR-MCNC: 184 MG/DL — HIGH (ref 70–99)
GLUCOSE SERPL-MCNC: 178 MG/DL — HIGH (ref 70–99)
GLUCOSE SERPL-MCNC: 182 MG/DL — HIGH (ref 70–99)
GLUCOSE SERPL-MCNC: 186 MG/DL — HIGH (ref 70–99)
HCT VFR BLD CALC: 24.3 % — LOW (ref 34.5–45)
HCT VFR BLD CALC: 26.2 % — LOW (ref 34.5–45)
HGB BLD-MCNC: 7.5 G/DL — LOW (ref 11.5–15.5)
HGB BLD-MCNC: 8.3 G/DL — LOW (ref 11.5–15.5)
INR BLD: 1.37 RATIO — HIGH (ref 0.88–1.16)
LDH SERPL L TO P-CCNC: 458 U/L — HIGH (ref 50–242)
MAGNESIUM SERPL-MCNC: 2.5 MG/DL — SIGNIFICANT CHANGE UP (ref 1.6–2.6)
MAGNESIUM SERPL-MCNC: 2.5 MG/DL — SIGNIFICANT CHANGE UP (ref 1.6–2.6)
MCHC RBC-ENTMCNC: 28.4 PG — SIGNIFICANT CHANGE UP (ref 27–34)
MCHC RBC-ENTMCNC: 29.1 PG — SIGNIFICANT CHANGE UP (ref 27–34)
MCHC RBC-ENTMCNC: 30.9 GM/DL — LOW (ref 32–36)
MCHC RBC-ENTMCNC: 31.7 GM/DL — LOW (ref 32–36)
MCV RBC AUTO: 89.7 FL — SIGNIFICANT CHANGE UP (ref 80–100)
MCV RBC AUTO: 94.2 FL — SIGNIFICANT CHANGE UP (ref 80–100)
NRBC # BLD: 14 /100 WBCS — HIGH (ref 0–0)
NRBC # BLD: 14 /100 WBCS — HIGH (ref 0–0)
PHOSPHATE SERPL-MCNC: 1.4 MG/DL — LOW (ref 2.5–4.5)
PHOSPHATE SERPL-MCNC: 1.7 MG/DL — LOW (ref 2.5–4.5)
PLATELET # BLD AUTO: 222 K/UL — SIGNIFICANT CHANGE UP (ref 150–400)
PLATELET # BLD AUTO: 268 K/UL — SIGNIFICANT CHANGE UP (ref 150–400)
POTASSIUM SERPL-MCNC: 4 MMOL/L — SIGNIFICANT CHANGE UP (ref 3.5–5.3)
POTASSIUM SERPL-SCNC: 4 MMOL/L — SIGNIFICANT CHANGE UP (ref 3.5–5.3)
PROT SERPL-MCNC: 6.4 G/DL — SIGNIFICANT CHANGE UP (ref 6–8.3)
PROT SERPL-MCNC: 6.5 G/DL — SIGNIFICANT CHANGE UP (ref 6–8.3)
PROTHROM AB SERPL-ACNC: 16.2 SEC — HIGH (ref 10.6–13.6)
RBC # BLD: 2.58 M/UL — LOW (ref 3.8–5.2)
RBC # BLD: 2.92 M/UL — LOW (ref 3.8–5.2)
RBC # FLD: 18.6 % — HIGH (ref 10.3–14.5)
RBC # FLD: 19.9 % — HIGH (ref 10.3–14.5)
SODIUM SERPL-SCNC: 133 MMOL/L — LOW (ref 135–145)
SODIUM SERPL-SCNC: 135 MMOL/L — SIGNIFICANT CHANGE UP (ref 135–145)
SODIUM SERPL-SCNC: 135 MMOL/L — SIGNIFICANT CHANGE UP (ref 135–145)
WBC # BLD: 38.33 K/UL — HIGH (ref 3.8–10.5)
WBC # BLD: 46.46 K/UL — CRITICAL HIGH (ref 3.8–10.5)
WBC # FLD AUTO: 38.33 K/UL — HIGH (ref 3.8–10.5)
WBC # FLD AUTO: 46.46 K/UL — CRITICAL HIGH (ref 3.8–10.5)

## 2020-12-05 PROCEDURE — 99232 SBSQ HOSP IP/OBS MODERATE 35: CPT

## 2020-12-05 PROCEDURE — 90945 DIALYSIS ONE EVALUATION: CPT | Mod: GC

## 2020-12-05 PROCEDURE — 99291 CRITICAL CARE FIRST HOUR: CPT

## 2020-12-05 RX ORDER — CHLORHEXIDINE GLUCONATE 213 G/1000ML
15 SOLUTION TOPICAL EVERY 12 HOURS
Refills: 0 | Status: ACTIVE | OUTPATIENT
Start: 2020-12-05 | End: 2021-11-03

## 2020-12-05 RX ADMIN — CHLORHEXIDINE GLUCONATE 15 MILLILITER(S): 213 SOLUTION TOPICAL at 17:08

## 2020-12-05 RX ADMIN — Medication 2 PUFF(S): at 23:48

## 2020-12-05 RX ADMIN — ALBUTEROL 4 PUFF(S): 90 AEROSOL, METERED ORAL at 05:21

## 2020-12-05 RX ADMIN — ALBUTEROL 4 PUFF(S): 90 AEROSOL, METERED ORAL at 23:48

## 2020-12-05 RX ADMIN — Medication 1: at 10:52

## 2020-12-05 RX ADMIN — PANTOPRAZOLE SODIUM 40 MILLIGRAM(S): 20 TABLET, DELAYED RELEASE ORAL at 11:02

## 2020-12-05 RX ADMIN — Medication 10 MILLIGRAM(S): at 11:02

## 2020-12-05 RX ADMIN — Medication 2 PUFF(S): at 05:21

## 2020-12-05 RX ADMIN — Medication 100 MILLIGRAM(S): at 15:27

## 2020-12-05 RX ADMIN — Medication 10 MILLIGRAM(S): at 05:59

## 2020-12-05 RX ADMIN — Medication 1 APPLICATION(S): at 06:01

## 2020-12-05 RX ADMIN — POLYETHYLENE GLYCOL 3350 17 GRAM(S): 17 POWDER, FOR SOLUTION ORAL at 06:01

## 2020-12-05 RX ADMIN — MIDODRINE HYDROCHLORIDE 10 MILLIGRAM(S): 2.5 TABLET ORAL at 05:59

## 2020-12-05 RX ADMIN — ALBUTEROL 4 PUFF(S): 90 AEROSOL, METERED ORAL at 12:00

## 2020-12-05 RX ADMIN — MIDODRINE HYDROCHLORIDE 10 MILLIGRAM(S): 2.5 TABLET ORAL at 21:23

## 2020-12-05 RX ADMIN — HUMAN INSULIN 10 UNIT(S): 100 INJECTION, SUSPENSION SUBCUTANEOUS at 10:54

## 2020-12-05 RX ADMIN — HUMAN INSULIN 10 UNIT(S): 100 INJECTION, SUSPENSION SUBCUTANEOUS at 16:50

## 2020-12-05 RX ADMIN — Medication 1: at 16:50

## 2020-12-05 RX ADMIN — MIDODRINE HYDROCHLORIDE 10 MILLIGRAM(S): 2.5 TABLET ORAL at 13:02

## 2020-12-05 RX ADMIN — Medication 1: at 05:58

## 2020-12-05 RX ADMIN — CHLORHEXIDINE GLUCONATE 1 APPLICATION(S): 213 SOLUTION TOPICAL at 11:00

## 2020-12-05 RX ADMIN — Medication 1 APPLICATION(S): at 05:59

## 2020-12-05 RX ADMIN — CHLORHEXIDINE GLUCONATE 15 MILLILITER(S): 213 SOLUTION TOPICAL at 05:59

## 2020-12-05 RX ADMIN — HUMAN INSULIN 10 UNIT(S): 100 INJECTION, SUSPENSION SUBCUTANEOUS at 22:41

## 2020-12-05 RX ADMIN — POLYETHYLENE GLYCOL 3350 17 GRAM(S): 17 POWDER, FOR SOLUTION ORAL at 17:10

## 2020-12-05 RX ADMIN — Medication 100 MILLIGRAM(S): at 06:00

## 2020-12-05 RX ADMIN — Medication 100 MILLIGRAM(S): at 21:23

## 2020-12-05 RX ADMIN — HUMAN INSULIN 10 UNIT(S): 100 INJECTION, SUSPENSION SUBCUTANEOUS at 05:58

## 2020-12-05 RX ADMIN — ALBUTEROL 4 PUFF(S): 90 AEROSOL, METERED ORAL at 17:10

## 2020-12-05 RX ADMIN — Medication 2 PUFF(S): at 01:27

## 2020-12-05 RX ADMIN — NYSTATIN CREAM 1 APPLICATION(S): 100000 CREAM TOPICAL at 17:09

## 2020-12-05 RX ADMIN — Medication 1: at 22:40

## 2020-12-05 RX ADMIN — Medication 2 PUFF(S): at 17:11

## 2020-12-05 RX ADMIN — NYSTATIN CREAM 1 APPLICATION(S): 100000 CREAM TOPICAL at 05:59

## 2020-12-05 RX ADMIN — Medication 2 PUFF(S): at 12:00

## 2020-12-05 RX ADMIN — ALBUTEROL 4 PUFF(S): 90 AEROSOL, METERED ORAL at 01:28

## 2020-12-05 RX ADMIN — Medication 1 APPLICATION(S): at 17:08

## 2020-12-05 RX ADMIN — Medication 1 APPLICATION(S): at 17:09

## 2020-12-05 RX ADMIN — CHLORHEXIDINE GLUCONATE 1 APPLICATION(S): 213 SOLUTION TOPICAL at 06:01

## 2020-12-05 NOTE — PROGRESS NOTE ADULT - SUBJECTIVE AND OBJECTIVE BOX
[facility] DIVISION OF KIDNEY DISEASES AND HYPERTENSION -- FOLLOW UP NOTE  --------------------------------------------------------------------------------  If any questions, please feel free to contact me  NS pager: [phone], [facility]: [phone]  Higinio Costello M.D.  Nephrology Fellow    (After 5 pm or on weekends please page the on-call fellow)  --------------------------------------------------------------------------------    Chief Complaint:  Patient is a 60y old  Female who presents with a chief complaint of Acute hypoxemic respiratory failure 2/2 covid19 (05 Dec 2020 07:30)    24 hour events/subjective:  Patient seen and examined at the bedside. Vital signs, labs, medications reviewed. Tolerating CRRT well, no issues with clotting, UF 100cc/hr. Vent requirement Fio2 40%, PEEP 10. still requiring IV pressors.         PAST HISTORY  --------------------------------------------------------------------------------  No significant changes to PMH, PSH, FHx, SHx, unless otherwise noted    ALLERGIES & MEDICATIONS  --------------------------------------------------------------------------------  Allergies    Kiwi (Unknown)  latex (Anaphylaxis)  latex (Unknown)  penicillin (Other)  penicillin (Unknown)  perfume  hives (Other)  potassium acetate (Other)  soap additives hives (Other)    Intolerances      Standing Inpatient Medications  ALBUTerol    90 MICROgram(s) HFA Inhaler 4 Puff(s) Inhalation every 6 hours  BACItracin   Ointment 1 Application(s) Topical two times a day  ceFAZolin   IVPB 2000 milliGRAM(s) IV Intermittent every 8 hours  chlorhexidine 0.12% Liquid 15 milliLiter(s) Oral Mucosa every 12 hours  chlorhexidine 4% Liquid 1 Application(s) Topical <User Schedule>  chlorhexidine 4% Liquid 1 Application(s) Topical <User Schedule>  CRRT Treatment    <Continuous>  dextrose 40% Gel 15 Gram(s) Oral once  dextrose 5%. 1000 milliLiter(s) IV Continuous <Continuous>  dextrose 5%. 1000 milliLiter(s) IV Continuous <Continuous>  dextrose 50% Injectable 25 Gram(s) IV Push once  dextrose 50% Injectable 12.5 Gram(s) IV Push once  dextrose 50% Injectable 25 Gram(s) IV Push once  fentaNYL   Infusion.. 1 MICROgram(s)/kG/Hr IV Continuous <Continuous>  glucagon  Injectable 1 milliGRAM(s) IntraMuscular once  heparin  Infusion 1900 Unit(s)/Hr IV Continuous <Continuous>  insulin lispro (ADMELOG) corrective regimen sliding scale   SubCutaneous every 6 hours  insulin NPH human recombinant 10 Unit(s) SubCutaneous every 6 hours  ipratropium 17 MICROgram(s) HFA Inhaler 2 Puff(s) Inhalation every 6 hours  ketamine Infusion. 0.25 mG/kG/Hr IV Continuous <Continuous>  levoFLOXacin IVPB 500 milliGRAM(s) IV Intermittent every 24 hours  metoclopramide Injectable 10 milliGRAM(s) IV Push every 6 hours  midazolam Infusion 0.08 mG/kG/Hr IV Continuous <Continuous>  midodrine 10 milliGRAM(s) Oral every 8 hours  norepinephrine Infusion 0.05 MICROgram(s)/kG/Min IV Continuous <Continuous>  nystatin Powder 1 Application(s) Topical two times a day  pantoprazole  Injectable 40 milliGRAM(s) IV Push daily  petrolatum Ophthalmic Ointment 1 Application(s) Both EYES two times a day  Phoxillum Filtration BK 4 / 2.5 5000 milliLiter(s) CRRT <Continuous>  Phoxillum Filtration BK 4 / 2.5 5000 milliLiter(s) CRRT <Continuous>  polyethylene glycol 3350 17 Gram(s) Oral every 12 hours  PrismaSOL Filtration BGK 0 / 2.5 5000 milliLiter(s) CRRT <Continuous>    PRN Inpatient Medications  acetaminophen    Suspension .. 650 milliGRAM(s) Oral every 6 hours PRN  sodium chloride 0.9% lock flush 10 milliLiter(s) IV Push every 1 hour PRN      REVIEW OF SYSTEMS  --------------------------------------------------------------------------------  unable to obtain due to medical conditions.     VITALS/PHYSICAL EXAM  --------------------------------------------------------------------------------  T(C): 36.3 (12-05-20 @ 07:00), Max: 37 (12-04-20 @ 20:00)  HR: 101 (12-05-20 @ 08:00) (69 - 119)  BP: --  RR: 30 (12-05-20 @ 08:00) (30 - 95)  SpO2: 94% (12-05-20 @ 08:00) (89% - 100%)  Wt(kg): --        12-04-20 @ 07:01  -  12-05-20 @ 07:00  --------------------------------------------------------  IN: 2556.4 mL / OUT: 2872 mL / NET: -315.6 mL    12-05-20 @ 07:01  -  12-05-20 @ 08:10  --------------------------------------------------------  IN: 78.4 mL / OUT: 118 mL / NET: -39.6 mL        Physical Exam:  Physical Exam deferred 2/2 COVID19 and PPE preservation      LABS/STUDIES  --------------------------------------------------------------------------------              8.3    46.46 >-----------<  268      [12-05-20 @ 01:07]              26.2     133  |  98  |  19  ----------------------------<  186      [12-05-20 @ 01:07]  4.0   |  21  |  0.95        Ca     8.7     [12-05-20 @ 01:07]      Mg     2.5     [12-05-20 @ 01:07]      Phos  1.7     [12-05-20 @ 01:07]    TPro  6.4  /  Alb  2.7  /  TBili  1.2  /  DBili  x   /  AST  65  /  ALT  25  /  AlkPhos  79  [12-05-20 @ 01:07]    PT/INR: PT 16.2 , INR 1.37       [12-05-20 @ 01:07]  PTT: 80.3       [12-05-20 @ 01:07]          [12-05-20 @ 01:07]    Creatinine Trend:  SCr 0.95 [12-05 @ 01:07]  SCr 0.81 [12-04 @ 16:21]  SCr 0.77 [12-04 @ 00:41]  SCr 0.83 [12-03 @ 17:40]  SCr 0.87 [12-03 @ 11:40]    Urinalysis - [11-26-20 @ 23:57]      Color Yellow / Appearance Slightly Turbid / SG 1.021 / pH 5.5      Gluc Negative / Ketone Negative  / Bili Negative / Urobili Negative       Blood Negative / Protein 30 mg/dL / Leuk Est Moderate / Nitrite Negative      RBC 3 / WBC 8 / Hyaline 4 / Gran  / Sq Epi  / Non Sq Epi 2 / Bacteria Occasional      Ferritin 1416      [12-04-20 @ 05:20]  HbA1c 6.6      [04-16-16 @ 06:00]  Lipid: chol --, , HDL --, LDL --      [11-30-20 @ 05:11]    HCV 0.10, Nonreact      [11-19-20 @ 09:32]

## 2020-12-05 NOTE — PROGRESS NOTE ADULT - ASSESSMENT
60 year old F w/ pmh of HTN, DVT on Xarelto, AARON who presented w/ fevers found to have COVI w/ superimposed PNA and bacteremia complicated by septic shock and ATN requiring CRRT and hypoxic resp failure requiring intubation.     Neuro  - Pt proned for 20 hours with improved oxygenation. Nimbex discontinued due to improving oxygenation and now downtitrating Versed.   - Continue ketamine, versed. Fentanyl  1 mcg/kg for pain control    Resp  Acute hypoxic resp failure 2/2 covid PNA  - Currently on 40% FiO2 and O2 sat > 93%.    CV  - Hyperdynamic LV, keep net even   - ST due to sepsis,    Hx of DVt on Xarelto  - Continue heparin gtt  - CT negative for PE    GI  - Continue TF, tolerating well  - Continue bowel regimen, last BM 12/3.       - No negro in place     Renal  ARF 2/2 ATN in the setting of septic shock  - Continue CRRT, keep net even.   - Discussed the central lines with attending and will leave in place for now.     ID  Covid 19  - Completed remdesivir, dexamethasone discontinued in setting of bacteremia  - + Sputum MSSA , + bacteremia P mirablis . CTA revealing cavitary consolidation. Will need > 6 weeks of antibiotics.   - WBC downtrended slightly .   - Continue cefazolin and Levofloxacin ( Day 3 ) .  - caspofungin discontinued, no fungal growth.   - BC from 12/1 NGTD    Citlali Low, DNP

## 2020-12-05 NOTE — PROGRESS NOTE ADULT - SUBJECTIVE AND OBJECTIVE BOX
CC: F/U for Bacteremia    Saw/spoke to patient. Intubated, critically ill in ICU.    ANTIMICROBIALS:  ceFAZolin   IVPB 2000 every 8 hours  levoFLOXacin IVPB 500 every 24 hours    PE:    Vital Signs Last 24 Hrs  T(C): 36.3 (05 Dec 2020 09:30), Max: 37 (04 Dec 2020 20:00)  T(F): 97.3 (05 Dec 2020 09:30), Max: 98.6 (04 Dec 2020 20:00)  HR: 97 (05 Dec 2020 09:45) (69 - 119)  RR: 30 (05 Dec 2020 09:45) (30 - 95)  SpO2: 93% (05 Dec 2020 09:45) (89% - 100%)    Gen: AOx0, sedated  CV: S1+S2 normal, nontachycardic  Resp: Intubated  Abd: Soft, nontender, +BS  Ext: No LE edema, no wounds  Lines: R sided shiley, L sided TLC    LABS:                        8.3    46.46 )-----------( 268      ( 05 Dec 2020 01:07 )             26.2     12-05    133<L>  |  98  |  19  ----------------------------<  186<H>  4.0   |  21<L>  |  0.95    Ca    8.7      05 Dec 2020 01:07  Phos  1.7     12-05  Mg     2.5     12-05    TPro  6.4  /  Alb  2.7<L>  /  TBili  1.2  /  DBili  x   /  AST  65<H>  /  ALT  25  /  AlkPhos  79  12-05    MICROBIOLOGY:    .Sputum Sputum  12-01-20   Moderate Stenotrophomonas maltophilia  Normal Respiratory Jocelynn present  --  Stenotrophomonas maltophilia    .Blood Blood-Peripheral  12-01-20   No growth to date.    .Urine Clean Catch (Midstream)  11-30-20   <10,000 CFU/mL Normal Urogenital Jocelynn    .Blood Blood-Peripheral  11-28-20   Growth in aerobic and anaerobic bottles: Staphylococcus aureus See  previous culture [id number]  --    Growth in aerobic bottle: Gram Positive Cocci in Clusters  Growth in anaerobic bottle: Gram Positive Cocci in Clusters    .Blood Blood-Peripheral  11-27-20   Growth in aerobic and anaerobic bottles: Staphylococcus aureus  Growth in anaerobic bottle: Staphylococcus hominis  Coag Negative Staphylococcus    .Sputum  11-25-20   Numerous Staphylococcus aureus  Normal Respiratory Jocelynn absent  --  Staphylococcus aureus    .Sputum Sputum  11-24-20   Moderate Staphylococcus aureus  Normal Respiratory Jocelynn absent  --  Staphylococcus aureus    .Blood Blood-Peripheral  11-19-20   No Growth Final     RADIOLOGY:    12/4 XR:    Frontal expiratory view of the chest shows the heart to be normal in size. Endotracheal tube, feeding tube and bilateral jugular catheters are present.    The lungs show progression of right lung infiltrate and there is no evidence of pneumothorax nor definite pleural effusion.    IMPRESSION:  Progression of right infiltrate.    Thank you for the courtesy of this referral.

## 2020-12-05 NOTE — PROGRESS NOTE ADULT - ATTENDING COMMENTS
I have seen this patient with the fellow and agree with their assessment and plan. In addition, ARTEMIO from ATN likely from COVID19 related disease and at this point on CVVHDF with phoxillum.  Monitor K, phos closely and also acid base status as this is a glucose free CRRT and hence can lead to starvation ketosis despite on CRRT and hence need ongoing nutrition.    Amara Alva MD  Cell   Pager   Office     Dr. Richy Qureshi to cover tomorrow

## 2020-12-05 NOTE — PROGRESS NOTE ADULT - SUBJECTIVE AND OBJECTIVE BOX
Medicine Progress Note    Patient is a 60y old  Female who presents with a chief complaint of Acute hypoxemic respiratory failure 2/2 covid19 (04 Dec 2020 17:41)      SUBJECTIVE / OVERNIGHT EVENTS:    ADDITIONAL REVIEW OF SYSTEMS:    MEDICATIONS  (STANDING):  ALBUTerol    90 MICROgram(s) HFA Inhaler 4 Puff(s) Inhalation every 6 hours  BACItracin   Ointment 1 Application(s) Topical two times a day  ceFAZolin   IVPB 2000 milliGRAM(s) IV Intermittent every 8 hours  chlorhexidine 0.12% Liquid 15 milliLiter(s) Oral Mucosa every 12 hours  chlorhexidine 4% Liquid 1 Application(s) Topical <User Schedule>  chlorhexidine 4% Liquid 1 Application(s) Topical <User Schedule>  CRRT Treatment    <Continuous>  dextrose 40% Gel 15 Gram(s) Oral once  dextrose 5%. 1000 milliLiter(s) (50 mL/Hr) IV Continuous <Continuous>  dextrose 5%. 1000 milliLiter(s) (100 mL/Hr) IV Continuous <Continuous>  dextrose 50% Injectable 25 Gram(s) IV Push once  dextrose 50% Injectable 12.5 Gram(s) IV Push once  dextrose 50% Injectable 25 Gram(s) IV Push once  fentaNYL   Infusion.. 1 MICROgram(s)/kG/Hr (6.81 mL/Hr) IV Continuous <Continuous>  glucagon  Injectable 1 milliGRAM(s) IntraMuscular once  heparin  Infusion 1900 Unit(s)/Hr (19 mL/Hr) IV Continuous <Continuous>  insulin lispro (ADMELOG) corrective regimen sliding scale   SubCutaneous every 6 hours  insulin NPH human recombinant 10 Unit(s) SubCutaneous every 6 hours  ipratropium 17 MICROgram(s) HFA Inhaler 2 Puff(s) Inhalation every 6 hours  ketamine Infusion. 0.25 mG/kG/Hr (3.4 mL/Hr) IV Continuous <Continuous>  levoFLOXacin IVPB 500 milliGRAM(s) IV Intermittent every 24 hours  metoclopramide Injectable 10 milliGRAM(s) IV Push every 6 hours  midazolam Infusion 0.08 mG/kG/Hr (10.9 mL/Hr) IV Continuous <Continuous>  midodrine 10 milliGRAM(s) Oral every 8 hours  norepinephrine Infusion 0.05 MICROgram(s)/kG/Min (12.8 mL/Hr) IV Continuous <Continuous>  nystatin Powder 1 Application(s) Topical two times a day  pantoprazole  Injectable 40 milliGRAM(s) IV Push daily  petrolatum Ophthalmic Ointment 1 Application(s) Both EYES two times a day  Phoxillum Filtration BK 4 / 2.5 5000 milliLiter(s) (3000 mL/Hr) CRRT <Continuous>  Phoxillum Filtration BK 4 / 2.5 5000 milliLiter(s) (1000 mL/Hr) CRRT <Continuous>  polyethylene glycol 3350 17 Gram(s) Oral every 12 hours  PrismaSOL Filtration BGK 0 / 2.5 5000 milliLiter(s) (200 mL/Hr) CRRT <Continuous>    MEDICATIONS  (PRN):  acetaminophen    Suspension .. 650 milliGRAM(s) Oral every 6 hours PRN Temp greater or equal to 38C (100.4F)  sodium chloride 0.9% lock flush 10 milliLiter(s) IV Push every 1 hour PRN Pre/post blood products, medications, blood draw, and to maintain line patency    CAPILLARY BLOOD GLUCOSE      POCT Blood Glucose.: 184 mg/dL (05 Dec 2020 05:18)  POCT Blood Glucose.: 192 mg/dL (04 Dec 2020 23:34)  POCT Blood Glucose.: 171 mg/dL (04 Dec 2020 23:01)  POCT Blood Glucose.: 185 mg/dL (04 Dec 2020 12:21)    I&O's Summary    04 Dec 2020 07:01  -  05 Dec 2020 07:00  --------------------------------------------------------  IN: 2556.4 mL / OUT: 2872 mL / NET: -315.6 mL        PHYSICAL EXAM:  Vital Signs Last 24 Hrs  T(C): 36.3 (05 Dec 2020 04:00), Max: 37 (04 Dec 2020 20:00)  T(F): 97.3 (05 Dec 2020 04:00), Max: 98.6 (04 Dec 2020 20:00)  HR: 102 (05 Dec 2020 07:00) (69 - 119)  BP: --  BP(mean): --  RR: 31 (05 Dec 2020 07:00) (30 - 95)  SpO2: 94% (05 Dec 2020 07:00) (89% - 100%)  CONSTITUTIONAL: NAD, well-developed, well-groomed  ENMT: Moist oral mucosa, no pharyngeal injection or exudates; normal dentition  RESPIRATORY: Normal respiratory effort; lungs are clear to auscultation bilaterally  CARDIOVASCULAR: Regular rate and rhythm, normal S1 and S2, no murmur/rub/gallop; No lower extremity edema; Peripheral pulses are 2+ bilaterally  ABDOMEN: Nontender to palpation, normoactive bowel sounds, no rebound/guarding; No hepatosplenomegaly  PSYCH: A+O to person, place, and time; affect appropriate  NEUROLOGY: CN 2-12 are intact and symmetric; no gross sensory deficits   SKIN: No rashes; no palpable lesions    LABS:                        8.3    46.46 )-----------( 268      ( 05 Dec 2020 01:07 )             26.2     12-05    133<L>  |  98  |  19  ----------------------------<  186<H>  4.0   |  21<L>  |  0.95    Ca    8.7      05 Dec 2020 01:07  Phos  1.7     12-05  Mg     2.5     12-05    TPro  6.4  /  Alb  2.7<L>  /  TBili  1.2  /  DBili  x   /  AST  65<H>  /  ALT  25  /  AlkPhos  79  12-05    PT/INR - ( 05 Dec 2020 01:07 )   PT: 16.2 sec;   INR: 1.37 ratio         PTT - ( 05 Dec 2020 01:07 )  PTT:80.3 sec          COVID-19 PCR: Detected (01 Dec 2020 17:41)  SARS-CoV-2: Detected (17 Nov 2020 22:56)      RADIOLOGY & ADDITIONAL TESTS:  Imaging from Last 24 Hours:    Electrocardiogram/QTc Interval:    COORDINATION OF CARE:  Care Discussed with Consultants/Other Providers:   Medicine Progress Note    60F w/pmhx AARON, HTN, 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 Reports that her  and son live with her and they have tested so far negative for covid and are asymptomatic, patient is not sure where was exposed. Reports trying her symbicort this week for shortness of breath without much relief. Patient yesterday began to have fevers with highest fever at 104F which alarmed her and for that reason she came to the hospital for help  (18 Nov 2020 05:46). Pt found to be COVID + and completed remdesivir 11/18-11/22. On 11/23 pt became hypoxic w/ increasign WOB and was intubated for airway protection and transferred to the COVID ICU. The patient was also proned that night for 18 hours. Hosp course was complicated by ATN which was unresponsive to bumex challenge. On 11/27 the pt was consented for CVVHDF. Her course was also complicated by MSSA/P. mirablis bacteremia potentially due to urine vs line-associated and MSSA in sputum due to PNA. Pt on antibiotics and antifungals for WBC in the 40s.  On 12/4 Pt proned 20 hours with improved in pO2. Now supine and FiO2 reduced to 60%. On 12/5 pt went to CTA C/A/P due to persistently elevated WBCs in the setting of being on antibiotics and found to have a large cavitary consolidation in the lung.    SUBJECTIVE / OVERNIGHT EVENTS: CTA Chest showed large cavitary consolidation which is likely source of elevated WBCs. FiO2 reduced to 40% and pt is doing well. Versed titrated down    ADDITIONAL REVIEW OF SYSTEMS: Unable to attain at this time. Pt is sedated and appears comfortable.     MEDICATIONS  (STANDING):  ALBUTerol    90 MICROgram(s) HFA Inhaler 4 Puff(s) Inhalation every 6 hours  BACItracin   Ointment 1 Application(s) Topical two times a day  ceFAZolin   IVPB 2000 milliGRAM(s) IV Intermittent every 8 hours  chlorhexidine 0.12% Liquid 15 milliLiter(s) Oral Mucosa every 12 hours  chlorhexidine 4% Liquid 1 Application(s) Topical <User Schedule>  chlorhexidine 4% Liquid 1 Application(s) Topical <User Schedule>  CRRT Treatment    <Continuous>  dextrose 40% Gel 15 Gram(s) Oral once  dextrose 5%. 1000 milliLiter(s) (50 mL/Hr) IV Continuous <Continuous>  dextrose 5%. 1000 milliLiter(s) (100 mL/Hr) IV Continuous <Continuous>  dextrose 50% Injectable 25 Gram(s) IV Push once  dextrose 50% Injectable 12.5 Gram(s) IV Push once  dextrose 50% Injectable 25 Gram(s) IV Push once  fentaNYL   Infusion.. 1 MICROgram(s)/kG/Hr (6.81 mL/Hr) IV Continuous <Continuous>  glucagon  Injectable 1 milliGRAM(s) IntraMuscular once  heparin  Infusion 1900 Unit(s)/Hr (19 mL/Hr) IV Continuous <Continuous>  insulin lispro (ADMELOG) corrective regimen sliding scale   SubCutaneous every 6 hours  insulin NPH human recombinant 10 Unit(s) SubCutaneous every 6 hours  ipratropium 17 MICROgram(s) HFA Inhaler 2 Puff(s) Inhalation every 6 hours  ketamine Infusion. 0.25 mG/kG/Hr (3.4 mL/Hr) IV Continuous <Continuous>  levoFLOXacin IVPB 500 milliGRAM(s) IV Intermittent every 24 hours  metoclopramide Injectable 10 milliGRAM(s) IV Push every 6 hours  midazolam Infusion 0.08 mG/kG/Hr (10.9 mL/Hr) IV Continuous <Continuous>  midodrine 10 milliGRAM(s) Oral every 8 hours  norepinephrine Infusion 0.05 MICROgram(s)/kG/Min (12.8 mL/Hr) IV Continuous <Continuous>  nystatin Powder 1 Application(s) Topical two times a day  pantoprazole  Injectable 40 milliGRAM(s) IV Push daily  petrolatum Ophthalmic Ointment 1 Application(s) Both EYES two times a day  Phoxillum Filtration BK 4 / 2.5 5000 milliLiter(s) (3000 mL/Hr) CRRT <Continuous>  Phoxillum Filtration BK 4 / 2.5 5000 milliLiter(s) (1000 mL/Hr) CRRT <Continuous>  polyethylene glycol 3350 17 Gram(s) Oral every 12 hours  PrismaSOL Filtration BGK 0 / 2.5 5000 milliLiter(s) (200 mL/Hr) CRRT <Continuous>    MEDICATIONS  (PRN):  acetaminophen    Suspension .. 650 milliGRAM(s) Oral every 6 hours PRN Temp greater or equal to 38C (100.4F)  sodium chloride 0.9% lock flush 10 milliLiter(s) IV Push every 1 hour PRN Pre/post blood products, medications, blood draw, and to maintain line patency    CAPILLARY BLOOD GLUCOSE      POCT Blood Glucose.: 184 mg/dL (05 Dec 2020 05:18)  POCT Blood Glucose.: 192 mg/dL (04 Dec 2020 23:34)  POCT Blood Glucose.: 171 mg/dL (04 Dec 2020 23:01)  POCT Blood Glucose.: 185 mg/dL (04 Dec 2020 12:21)    I&O's Summary    04 Dec 2020 07:01  -  05 Dec 2020 07:00  --------------------------------------------------------  IN: 2556.4 mL / OUT: 2872 mL / NET: -315.6 mL        PHYSICAL EXAM:  Vital Signs Last 24 Hrs  T(C): 36.3 (05 Dec 2020 04:00), Max: 37 (04 Dec 2020 20:00)  T(F): 97.3 (05 Dec 2020 04:00), Max: 98.6 (04 Dec 2020 20:00)  HR: 102 (05 Dec 2020 07:00) (69 - 119)  RR: 31 (05 Dec 2020 07:00) (30 - 95)  SpO2: 94% (05 Dec 2020 07:00) (89% - 100%)  CONSTITUTIONAL: NAD, well-developed, well-groomed  ENMT: Moist oral mucosa, no pharyngeal injection or exudates; normal dentition  RESPIRATORY: Course and reduced breath sounds, intubated  CARDIOVASCULAR: Regular rate and rhythm, normal S1 and S2, no murmur/rub/gallop; No lower extremity edema; Peripheral pulses are 2+ bilaterally  ABDOMEN: Nontender to palpation, normoactive bowel sounds, no rebound/guarding; No hepatosplenomegaly. Abdomen obese.   LABS:                        8.3    46.46 )-----------( 268      ( 05 Dec 2020 01:07 )             26.2     12-05    133<L>  |  98  |  19  ----------------------------<  186<H>  4.0   |  21<L>  |  0.95    Ca    8.7      05 Dec 2020 01:07  Phos  1.7     12-05  Mg     2.5     12-05    TPro  6.4  /  Alb  2.7<L>  /  TBili  1.2  /  DBili  x   /  AST  65<H>  /  ALT  25  /  AlkPhos  79  12-05    PT/INR - ( 05 Dec 2020 01:07 )   PT: 16.2 sec;   INR: 1.37 ratio      PTT - ( 05 Dec 2020 01:07 )  PTT:80.3 sec    COVID-19 PCR: Detected (01 Dec 2020 17:41)  SARS-CoV-2: Detected (17 Nov 2020 22:56)    RADIOLOGY & ADDITIONAL TESTS:  Imaging from Last 24 Hours: < from: CT Chest w/ IV Cont (12.04.20 @ 22:02) >  IMPRESSION:  New large consolidation with areas of cavitation in the right lung. Multifocal pneumonia with cavitation is a consideration.    No acute intra-abdominal pathology.    Electrocardiogram/QTc Interval: cElectrocardiogram/QTc Interval:  QTc 460    COORDINATION OF CARE:  Care Discussed with Consultants/Other Providers: Care discussed with infectious disease

## 2020-12-05 NOTE — PROGRESS NOTE ADULT - ASSESSMENT
Pt is a 61 y/o F w PMH of AARON, HTN, HLD, prior DVT/PE hx, asthma presented to [facility] for SOB and productive cough prior to admission. Admitted for acute hypoxic respiratory failure 2/2 to COVID infection. Was subsequently intubated and proned on 11/23//20. Was transferred to ICU for further management. Was started on pressors and nimbex drip. Nephrology consulted for ARTEMIO    #ARTEMIO  Pt with ARTEMIO in the setting of septic shock 2/2 to COVID infection. Now likely in ATN. Noted to have Scr of 0.53 on 11/23/20, then Scr further increased to 1.64 on 11/24/20, 2.18 on 11/25/20 and then further to 2.57 on 11/26/20 and 3.0 on 11/27. Patient was started on CRRT and patient now anuric. Adequate clearances noted on CRRT, tolerating well with UF goal 100cc/hr. Noted hypophosphatemic thia am, CRRT fluids change to Phoxillum, Tolerating tube feedings. Monitor labs and urine output. Avoid NSAIDs, ACEI/ARBS, RCA and nephrotoxins. Dose medications as per CRRT

## 2020-12-05 NOTE — PROGRESS NOTE ADULT - ATTENDING COMMENTS
Acute hypoxemic respiratory failure with ARDS due to COVDI19 pneumonia with course complicated by MSSA and Proteus bacteremia as well as Stenotrophomonas and MSSA in sputum.  - CT chest shows a lung abscess. If persistent leukocytosis, may need to drain. Caspofungin discontinued overnight. Continue Ancef/Levaquin.  - On CVVH for fluid removal.  - Wean sedation.

## 2020-12-05 NOTE — PROGRESS NOTE ADULT - ASSESSMENT
60 F with PMH HTN, DVT/PE in past, ?Asthma (likely 2/2 PE? 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 Urinary Source  Elevated leukocytosis which may be secondary to previous corticosteroids or secondary to uncontrolled/persistent MSSA infection   Will need line replacement as patient was actively bacteremia at time of currently line placement  Would pursue CT C/A/P when able to exclude abscess / uncontrolled focus for bacteremia  Patient also with Stenotrophomonas in sputum; given tenuous respiratory status and rising WBC would cover for this etiology  Treating for MSSA/Proteus bacteremia as well as Steno in sputum    Overall,    #Positive Sputum Culture (Stenotrophomonas)  --Continue Levofloxacin 500 mg IV Q24H (while on CRRT) x 7 days total of therapy    #MSSA bacteremia, Leukocytosis (worsening)  --Continue Cefazolin 2g IV Q12H while on CRRT (redose based on renal status)  --F/U CT A/P read  --Would DC Caspofungin  --Recommend line change (especially if clinical instability or persistence in bacteremia)  --Check TTE  --F/U BCXs, ensure clear    #P. mirablis bacteremia  --Cefazolin as above    #COVID-19, Hypoxic Respiratory Failure (persistent)  - please hold further Dexamethasone (in setting of active bacteremia)  - Continue supplemental O2 and supportive care per primary team  - Continue Contact and Airborne Isolation precautions    Pee Garcia MD  Pager [phone]  After 5pm and on weekends call [phone]

## 2020-12-06 LAB
APTT BLD: 64.7 SEC — HIGH (ref 27.5–35.5)
APTT BLD: 80.2 SEC — HIGH (ref 27.5–35.5)
CULTURE RESULTS: SIGNIFICANT CHANGE UP
CULTURE RESULTS: SIGNIFICANT CHANGE UP
GLUCOSE BLDC GLUCOMTR-MCNC: 118 MG/DL — HIGH (ref 70–99)
GLUCOSE BLDC GLUCOMTR-MCNC: 149 MG/DL — HIGH (ref 70–99)
GLUCOSE BLDC GLUCOMTR-MCNC: 154 MG/DL — HIGH (ref 70–99)
GLUCOSE BLDC GLUCOMTR-MCNC: 164 MG/DL — HIGH (ref 70–99)
SPECIMEN SOURCE: SIGNIFICANT CHANGE UP
SPECIMEN SOURCE: SIGNIFICANT CHANGE UP

## 2020-12-06 PROCEDURE — 90945 DIALYSIS ONE EVALUATION: CPT | Mod: GC

## 2020-12-06 PROCEDURE — 71045 X-RAY EXAM CHEST 1 VIEW: CPT | Mod: 26

## 2020-12-06 PROCEDURE — 99291 CRITICAL CARE FIRST HOUR: CPT

## 2020-12-06 RX ORDER — ACETAMINOPHEN 500 MG
1000 TABLET ORAL ONCE
Refills: 0 | Status: COMPLETED | OUTPATIENT
Start: 2020-12-06 | End: 2020-12-06

## 2020-12-06 RX ORDER — DEXMEDETOMIDINE HYDROCHLORIDE IN 0.9% SODIUM CHLORIDE 4 UG/ML
0.2 INJECTION INTRAVENOUS
Qty: 200 | Refills: 0 | Status: ACTIVE | OUTPATIENT
Start: 2020-12-06 | End: 2021-11-04

## 2020-12-06 RX ADMIN — Medication 1000 MILLIGRAM(S): at 20:30

## 2020-12-06 RX ADMIN — Medication 2 PUFF(S): at 13:02

## 2020-12-06 RX ADMIN — DEXMEDETOMIDINE HYDROCHLORIDE IN 0.9% SODIUM CHLORIDE 6.81 MICROGRAM(S)/KG/HR: 4 INJECTION INTRAVENOUS at 20:02

## 2020-12-06 RX ADMIN — MIDODRINE HYDROCHLORIDE 10 MILLIGRAM(S): 2.5 TABLET ORAL at 13:09

## 2020-12-06 RX ADMIN — Medication 12.8 MICROGRAM(S)/KG/MIN: at 20:02

## 2020-12-06 RX ADMIN — Medication 2 PUFF(S): at 05:43

## 2020-12-06 RX ADMIN — NYSTATIN CREAM 1 APPLICATION(S): 100000 CREAM TOPICAL at 05:19

## 2020-12-06 RX ADMIN — POLYETHYLENE GLYCOL 3350 17 GRAM(S): 17 POWDER, FOR SOLUTION ORAL at 17:03

## 2020-12-06 RX ADMIN — Medication 400 MILLIGRAM(S): at 20:01

## 2020-12-06 RX ADMIN — HUMAN INSULIN 10 UNIT(S): 100 INJECTION, SUSPENSION SUBCUTANEOUS at 16:33

## 2020-12-06 RX ADMIN — Medication 1 APPLICATION(S): at 17:02

## 2020-12-06 RX ADMIN — CHLORHEXIDINE GLUCONATE 15 MILLILITER(S): 213 SOLUTION TOPICAL at 17:03

## 2020-12-06 RX ADMIN — POLYETHYLENE GLYCOL 3350 17 GRAM(S): 17 POWDER, FOR SOLUTION ORAL at 05:18

## 2020-12-06 RX ADMIN — CHLORHEXIDINE GLUCONATE 15 MILLILITER(S): 213 SOLUTION TOPICAL at 05:16

## 2020-12-06 RX ADMIN — Medication 1 APPLICATION(S): at 05:16

## 2020-12-06 RX ADMIN — HUMAN INSULIN 10 UNIT(S): 100 INJECTION, SUSPENSION SUBCUTANEOUS at 23:09

## 2020-12-06 RX ADMIN — Medication 2 PUFF(S): at 17:55

## 2020-12-06 RX ADMIN — ALBUTEROL 4 PUFF(S): 90 AEROSOL, METERED ORAL at 05:43

## 2020-12-06 RX ADMIN — Medication 1 APPLICATION(S): at 05:18

## 2020-12-06 RX ADMIN — Medication 1 APPLICATION(S): at 17:03

## 2020-12-06 RX ADMIN — HUMAN INSULIN 10 UNIT(S): 100 INJECTION, SUSPENSION SUBCUTANEOUS at 05:17

## 2020-12-06 RX ADMIN — Medication 2 PUFF(S): at 23:57

## 2020-12-06 RX ADMIN — ALBUTEROL 4 PUFF(S): 90 AEROSOL, METERED ORAL at 13:02

## 2020-12-06 RX ADMIN — MIDODRINE HYDROCHLORIDE 10 MILLIGRAM(S): 2.5 TABLET ORAL at 05:16

## 2020-12-06 RX ADMIN — NYSTATIN CREAM 1 APPLICATION(S): 100000 CREAM TOPICAL at 17:03

## 2020-12-06 RX ADMIN — Medication 100 MILLIGRAM(S): at 13:09

## 2020-12-06 RX ADMIN — PANTOPRAZOLE SODIUM 40 MILLIGRAM(S): 20 TABLET, DELAYED RELEASE ORAL at 11:00

## 2020-12-06 RX ADMIN — ALBUTEROL 4 PUFF(S): 90 AEROSOL, METERED ORAL at 23:57

## 2020-12-06 RX ADMIN — Medication 100 MILLIGRAM(S): at 05:18

## 2020-12-06 RX ADMIN — Medication 100 MILLIGRAM(S): at 22:08

## 2020-12-06 RX ADMIN — Medication 1: at 23:08

## 2020-12-06 RX ADMIN — HUMAN INSULIN 10 UNIT(S): 100 INJECTION, SUSPENSION SUBCUTANEOUS at 10:56

## 2020-12-06 RX ADMIN — ALBUTEROL 4 PUFF(S): 90 AEROSOL, METERED ORAL at 17:55

## 2020-12-06 RX ADMIN — Medication 1: at 05:17

## 2020-12-06 RX ADMIN — MIDODRINE HYDROCHLORIDE 10 MILLIGRAM(S): 2.5 TABLET ORAL at 22:09

## 2020-12-06 RX ADMIN — CHLORHEXIDINE GLUCONATE 1 APPLICATION(S): 213 SOLUTION TOPICAL at 05:19

## 2020-12-06 NOTE — PROGRESS NOTE ADULT - ATTENDING COMMENTS
Acute hypoxemic respiratory failure with ARDS due to COVDI19 pneumonia with course complicated by MSSA and Proteus bacteremia as well as Stenotrophomonas and MSSA in sputum.  - Improving leukocytosis. Continue Ancef/Levaquin.  - Will stop CVVH. Have been keeping even fluid balance.  - Continue to wean sedation. D/C Fentanyl. Wean Ketamine.

## 2020-12-06 NOTE — PROGRESS NOTE ADULT - ASSESSMENT
60 year old F w/ pmh of HTN, DVT on Xarelto, AARON who presented w/ fevers found to have COVI w/ superimposed PNA and bacteremia complicated by septic shock and ATN requiring CRRT and hypoxic resp failure requiring intubation.     Neuro  - Pt proned for 20 hours on 12/4 with improved oxygenation. Nimbex discontinued due to improving oxygenation and now off Versed  - Continue Ketamine at .75 and Fentanyl 1    Resp  Acute hypoxic resp failure 2/2 covid PNA  - Currently on 40% FiO2 and O2 sat > 93%.    CV  - Hyperdynamic LV, keep net even   - ST has resolved    Hx of DVt on Xarelto  - Continue heparin gtt, lowered to 1300 now w/ therapeutic ptt  - CT negative for PE    GI  - Continue TF, tolerating well  - Continue bowel regimen, last BM 12/3.       - No negro in place, bladder  not distended on CT    Renal  ARF 2/2 ATN in the setting of septic shock  - Continue CRRT, keep net even.   - Discussed the central lines with attending and will leave in place for now.     ID  Covid 19  - Completed remdesivir, dexamethasone discontinued in setting of bacteremia  - + Sputum MSSA , + bacteremia P mirablis . CTA revealing cavitary consolidation. Will need > 6 weeks of antibiotics.   - WBC downtrended from 46 to 36  - Continue cefazolin and Levofloxacin ( Day 4 ) .  - caspofungin discontinued, no fungal growth.   - BC from 12/1 NGTD, Sputum cx Strenotrophemonas sensitive to Levofloxacin    Citlali Low, DNP

## 2020-12-06 NOTE — PROGRESS NOTE ADULT - ASSESSMENT
Pt is a 59 y/o F w PMH of AARON, HTN, HLD, prior DVT/PE hx, asthma presented to [facility] for SOB and productive cough prior to admission. Admitted for acute hypoxic respiratory failure 2/2 to COVID infection. Was subsequently intubated and proned on 11/23//20. Was transferred to ICU for further management. Was started on pressors and nimbex drip. Nephrology consulted for ARTEMIO    #ARTEMIO  Pt with ARTEMIO in the setting of septic shock 2/2 to COVID infection. Now likely in ATN. Noted to have Scr of 0.53 on 11/23/20, then Scr further increased to 1.64 on 11/24/20, 2.18 on 11/25/20 and then further to 2.57 on 11/26/20 and 3.0 on 11/27. Patient was started on CRRT and patient now anuric. Adequate clearances noted on CRRT, tolerating well with UF goal 100cc/hr. Noted hypophosphatemic, CRRT fluids change to Phoxillum on 11/5, will continue current prescription. Tolerating tube feedings. Monitor labs and urine output. Avoid NSAIDs, ACEI/ARBS, RCA and nephrotoxins. Dose medications as per CRRT Pt is a 61 y/o F w PMH of AARON, HTN, HLD, prior DVT/PE hx, asthma presented to [facility] for SOB and productive cough prior to admission. Admitted for acute hypoxic respiratory failure 2/2 to COVID infection. Was subsequently intubated and proned on 11/23//20. Was transferred to ICU for further management. Was started on pressors and nimbex drip. Nephrology consulted for ARTEMIO    #ARTEMIO  Pt with ARTEMIO in the setting of septic shock 2/2 to COVID infection. Now likely in ATN. Noted to have Scr of 0.53 on 11/23/20, then Scr further increased to 1.64 on 11/24/20, 2.18 on 11/25/20 and then further to 2.57 on 11/26/20 and 3.0 on 11/27. Patient was started on CRRT and patient now anuric. Adequate clearances noted on CRRT, Current  UF goal  to net even. Noted hypophosphatemic, CRRT fluids change to Phoxillum on 11/5, will continue current prescription. Tolerating tube feedings. Monitor labs and urine output. Avoid NSAIDs, ACEI/ARBS, RCA and nephrotoxins. Dose medications as per CRRT

## 2020-12-06 NOTE — PROGRESS NOTE ADULT - SUBJECTIVE AND OBJECTIVE BOX
[facility] DIVISION OF KIDNEY DISEASES AND HYPERTENSION -- FOLLOW UP NOTE  --------------------------------------------------------------------------------  If any questions, please feel free to contact me  NS pager: [phone], [facility]: [phone]  Higinio Costello M.D.  Nephrology Fellow    (After 5 pm or on weekends please page the on-call fellow)  --------------------------------------------------------------------------------    Chief Complaint:  Patient is a 60y old  Female who presents with a chief complaint of Acute hypoxemic respiratory failure 2/2 covid19 (06 Dec 2020 07:06)    24 hour events/subjective:  Patient seen at the bedside. Vital signs, labs, medications reviewed. Tolerating CRRT, goal UF 100cc/hr. Vent requirement Fio2 40%, PEEP 10. still requiring IV pressors.       PAST HISTORY  --------------------------------------------------------------------------------  No significant changes to PMH, PSH, FHx, SHx, unless otherwise noted    ALLERGIES & MEDICATIONS  --------------------------------------------------------------------------------  Allergies    Kiwi (Unknown)  latex (Anaphylaxis)  latex (Unknown)  penicillin (Other)  penicillin (Unknown)  perfume  hives (Other)  potassium acetate (Other)  soap additives hives (Other)    Intolerances      Standing Inpatient Medications  ALBUTerol    90 MICROgram(s) HFA Inhaler 4 Puff(s) Inhalation every 6 hours  BACItracin   Ointment 1 Application(s) Topical two times a day  ceFAZolin   IVPB 2000 milliGRAM(s) IV Intermittent every 8 hours  chlorhexidine 0.12% Liquid 15 milliLiter(s) Oral Mucosa every 12 hours  chlorhexidine 4% Liquid 1 Application(s) Topical <User Schedule>  chlorhexidine 4% Liquid 1 Application(s) Topical <User Schedule>  CRRT Treatment    <Continuous>  dextrose 40% Gel 15 Gram(s) Oral once  dextrose 5%. 1000 milliLiter(s) IV Continuous <Continuous>  dextrose 5%. 1000 milliLiter(s) IV Continuous <Continuous>  dextrose 50% Injectable 25 Gram(s) IV Push once  dextrose 50% Injectable 12.5 Gram(s) IV Push once  dextrose 50% Injectable 25 Gram(s) IV Push once  fentaNYL   Infusion.. 1 MICROgram(s)/kG/Hr IV Continuous <Continuous>  glucagon  Injectable 1 milliGRAM(s) IntraMuscular once  heparin  Infusion 1900 Unit(s)/Hr IV Continuous <Continuous>  insulin lispro (ADMELOG) corrective regimen sliding scale   SubCutaneous every 6 hours  insulin NPH human recombinant 10 Unit(s) SubCutaneous every 6 hours  ipratropium 17 MICROgram(s) HFA Inhaler 2 Puff(s) Inhalation every 6 hours  ketamine Infusion. 0.25 mG/kG/Hr IV Continuous <Continuous>  levoFLOXacin IVPB 500 milliGRAM(s) IV Intermittent every 24 hours  midazolam Infusion 0.08 mG/kG/Hr IV Continuous <Continuous>  midodrine 10 milliGRAM(s) Oral every 8 hours  norepinephrine Infusion 0.05 MICROgram(s)/kG/Min IV Continuous <Continuous>  nystatin Powder 1 Application(s) Topical two times a day  pantoprazole  Injectable 40 milliGRAM(s) IV Push daily  petrolatum Ophthalmic Ointment 1 Application(s) Both EYES two times a day  Phoxillum Filtration BK 4 / 2.5 5000 milliLiter(s) CRRT <Continuous>  Phoxillum Filtration BK 4 / 2.5 5000 milliLiter(s) CRRT <Continuous>  polyethylene glycol 3350 17 Gram(s) Oral every 12 hours  PrismaSOL Filtration BGK 0 / 2.5 5000 milliLiter(s) CRRT <Continuous>    PRN Inpatient Medications  acetaminophen    Suspension .. 650 milliGRAM(s) Oral every 6 hours PRN  sodium chloride 0.9% lock flush 10 milliLiter(s) IV Push every 1 hour PRN      REVIEW OF SYSTEMS  --------------------------------------------------------------------------------  unable to obtain due to medical conditions.       VITALS/PHYSICAL EXAM  --------------------------------------------------------------------------------  T(C): 36.6 (12-06-20 @ 07:00), Max: 36.8 (12-05-20 @ 15:00)  HR: 98 (12-06-20 @ 07:00) (90 - 106)  BP: --  RR: 31 (12-06-20 @ 07:00) (30 - 46)  SpO2: 91% (12-06-20 @ 07:00) (87% - 100%)  Wt(kg): --        12-05-20 @ 07:01  -  12-06-20 @ 07:00  --------------------------------------------------------  IN: 1970.8 mL / OUT: 1842 mL / NET: 128.8 mL        Physical Exam:  	Physical Exam deferred 2/2 COVID19 and PPE preservation    LABS/STUDIES  --------------------------------------------------------------------------------              7.5    38.33 >-----------<  222      [12-05-20 @ 22:56]              24.3     135  |  101  |  12  ----------------------------<  122      [12-06-20 @ 01:37]  3.9   |  23  |  0.76        Ca     8.2     [12-06-20 @ 01:37]      Mg     2.5     [12-06-20 @ 01:37]      Phos  2.2     [12-06-20 @ 01:37]    TPro  6.3  /  Alb  2.7  /  TBili  0.9  /  DBili  x   /  AST  43  /  ALT  9   /  AlkPhos  74  [12-06-20 @ 01:37]    PT/INR: PT 15.8 , INR 1.33       [12-06-20 @ 01:37]  PTT: 145.5      [12-06-20 @ 01:37]          [12-05-20 @ 01:07]    Creatinine Trend:  SCr 0.76 [12-06 @ 01:37]  SCr 0.80 [12-05 @ 21:48]  SCr 0.81 [12-05 @ 10:48]  SCr 0.95 [12-05 @ 01:07]  SCr 0.81 [12-04 @ 16:21]    Urinalysis - [11-26-20 @ 23:57]      Color Yellow / Appearance Slightly Turbid / SG 1.021 / pH 5.5      Gluc Negative / Ketone Negative  / Bili Negative / Urobili Negative       Blood Negative / Protein 30 mg/dL / Leuk Est Moderate / Nitrite Negative      RBC 3 / WBC 8 / Hyaline 4 / Gran  / Sq Epi  / Non Sq Epi 2 / Bacteria Occasional      Ferritin 1879      [12-05-20 @ 10:06]  HbA1c 6.6      [04-16-16 @ 06:00]  Lipid: chol --, , HDL --, LDL --      [11-30-20 @ 05:11]    HCV 0.10, Nonreact      [11-19-20 @ 09:32]     [facility] DIVISION OF KIDNEY DISEASES AND HYPERTENSION -- FOLLOW UP NOTE  --------------------------------------------------------------------------------  If any questions, please feel free to contact me  [facility] pager: [phone], [facility]: [phone]  Higinio Costello M.D.  Nephrology Fellow    (After 5 pm or on weekends please page the on-call fellow)  --------------------------------------------------------------------------------    Chief Complaint:  Patient is a 60y old  Female who presents with a chief complaint of Acute hypoxemic respiratory failure 2/2 covid19 (06 Dec 2020 07:06)    24 hour events/subjective:  Patient seen at the bedside. Vital signs, labs, medications reviewed. Tolerating CRRT, current UF goal to net even. Vent requirement Fio2 40%, PEEP 10. still requiring IV pressors.       PAST HISTORY  --------------------------------------------------------------------------------  No significant changes to PMH, PSH, FHx, SHx, unless otherwise noted    ALLERGIES & MEDICATIONS  --------------------------------------------------------------------------------  Allergies    Kiwi (Unknown)  latex (Anaphylaxis)  latex (Unknown)  penicillin (Other)  penicillin (Unknown)  perfume  hives (Other)  potassium acetate (Other)  soap additives hives (Other)    Intolerances      Standing Inpatient Medications  ALBUTerol    90 MICROgram(s) HFA Inhaler 4 Puff(s) Inhalation every 6 hours  BACItracin   Ointment 1 Application(s) Topical two times a day  ceFAZolin   IVPB 2000 milliGRAM(s) IV Intermittent every 8 hours  chlorhexidine 0.12% Liquid 15 milliLiter(s) Oral Mucosa every 12 hours  chlorhexidine 4% Liquid 1 Application(s) Topical <User Schedule>  chlorhexidine 4% Liquid 1 Application(s) Topical <User Schedule>  CRRT Treatment    <Continuous>  dextrose 40% Gel 15 Gram(s) Oral once  dextrose 5%. 1000 milliLiter(s) IV Continuous <Continuous>  dextrose 5%. 1000 milliLiter(s) IV Continuous <Continuous>  dextrose 50% Injectable 25 Gram(s) IV Push once  dextrose 50% Injectable 12.5 Gram(s) IV Push once  dextrose 50% Injectable 25 Gram(s) IV Push once  fentaNYL   Infusion.. 1 MICROgram(s)/kG/Hr IV Continuous <Continuous>  glucagon  Injectable 1 milliGRAM(s) IntraMuscular once  heparin  Infusion 1900 Unit(s)/Hr IV Continuous <Continuous>  insulin lispro (ADMELOG) corrective regimen sliding scale   SubCutaneous every 6 hours  insulin NPH human recombinant 10 Unit(s) SubCutaneous every 6 hours  ipratropium 17 MICROgram(s) HFA Inhaler 2 Puff(s) Inhalation every 6 hours  ketamine Infusion. 0.25 mG/kG/Hr IV Continuous <Continuous>  levoFLOXacin IVPB 500 milliGRAM(s) IV Intermittent every 24 hours  midazolam Infusion 0.08 mG/kG/Hr IV Continuous <Continuous>  midodrine 10 milliGRAM(s) Oral every 8 hours  norepinephrine Infusion 0.05 MICROgram(s)/kG/Min IV Continuous <Continuous>  nystatin Powder 1 Application(s) Topical two times a day  pantoprazole  Injectable 40 milliGRAM(s) IV Push daily  petrolatum Ophthalmic Ointment 1 Application(s) Both EYES two times a day  Phoxillum Filtration BK 4 / 2.5 5000 milliLiter(s) CRRT <Continuous>  Phoxillum Filtration BK 4 / 2.5 5000 milliLiter(s) CRRT <Continuous>  polyethylene glycol 3350 17 Gram(s) Oral every 12 hours  PrismaSOL Filtration BGK 0 / 2.5 5000 milliLiter(s) CRRT <Continuous>    PRN Inpatient Medications  acetaminophen    Suspension .. 650 milliGRAM(s) Oral every 6 hours PRN  sodium chloride 0.9% lock flush 10 milliLiter(s) IV Push every 1 hour PRN      REVIEW OF SYSTEMS  --------------------------------------------------------------------------------  unable to obtain due to medical conditions.       VITALS/PHYSICAL EXAM  --------------------------------------------------------------------------------  T(C): 36.6 (12-06-20 @ 07:00), Max: 36.8 (12-05-20 @ 15:00)  HR: 98 (12-06-20 @ 07:00) (90 - 106)  BP: --  RR: 31 (12-06-20 @ 07:00) (30 - 46)  SpO2: 91% (12-06-20 @ 07:00) (87% - 100%)  Wt(kg): --        12-05-20 @ 07:01  -  12-06-20 @ 07:00  --------------------------------------------------------  IN: 1970.8 mL / OUT: 1842 mL / NET: 128.8 mL        Physical Exam:  	Physical Exam deferred 2/2 COVID19 and PPE preservation    LABS/STUDIES  --------------------------------------------------------------------------------              7.5    38.33 >-----------<  222      [12-05-20 @ 22:56]              24.3     135  |  101  |  12  ----------------------------<  122      [12-06-20 @ 01:37]  3.9   |  23  |  0.76        Ca     8.2     [12-06-20 @ 01:37]      Mg     2.5     [12-06-20 @ 01:37]      Phos  2.2     [12-06-20 @ 01:37]    TPro  6.3  /  Alb  2.7  /  TBili  0.9  /  DBili  x   /  AST  43  /  ALT  9   /  AlkPhos  74  [12-06-20 @ 01:37]    PT/INR: PT 15.8 , INR 1.33       [12-06-20 @ 01:37]  PTT: 145.5      [12-06-20 @ 01:37]          [12-05-20 @ 01:07]    Creatinine Trend:  SCr 0.76 [12-06 @ 01:37]  SCr 0.80 [12-05 @ 21:48]  SCr 0.81 [12-05 @ 10:48]  SCr 0.95 [12-05 @ 01:07]  SCr 0.81 [12-04 @ 16:21]    Urinalysis - [11-26-20 @ 23:57]      Color Yellow / Appearance Slightly Turbid / SG 1.021 / pH 5.5      Gluc Negative / Ketone Negative  / Bili Negative / Urobili Negative       Blood Negative / Protein 30 mg/dL / Leuk Est Moderate / Nitrite Negative      RBC 3 / WBC 8 / Hyaline 4 / Gran  / Sq Epi  / Non Sq Epi 2 / Bacteria Occasional      Ferritin 1879      [12-05-20 @ 10:06]  HbA1c 6.6      [04-16-16 @ 06:00]  Lipid: chol --, , HDL --, LDL --      [11-30-20 @ 05:11]    HCV 0.10, Nonreact      [11-19-20 @ 09:32]

## 2020-12-06 NOTE — PROGRESS NOTE ADULT - SUBJECTIVE AND OBJECTIVE BOX
Medicine Progress Note    Patient is a 60y old  Female who presents with a chief complaint of Acute hypoxemic respiratory failure 2/2 covid19 (05 Dec 2020 08:10)      SUBJECTIVE / OVERNIGHT EVENTS:    ADDITIONAL REVIEW OF SYSTEMS:    MEDICATIONS  (STANDING):  ALBUTerol    90 MICROgram(s) HFA Inhaler 4 Puff(s) Inhalation every 6 hours  BACItracin   Ointment 1 Application(s) Topical two times a day  ceFAZolin   IVPB 2000 milliGRAM(s) IV Intermittent every 8 hours  chlorhexidine 0.12% Liquid 15 milliLiter(s) Oral Mucosa every 12 hours  chlorhexidine 4% Liquid 1 Application(s) Topical <User Schedule>  chlorhexidine 4% Liquid 1 Application(s) Topical <User Schedule>  CRRT Treatment    <Continuous>  dextrose 40% Gel 15 Gram(s) Oral once  dextrose 5%. 1000 milliLiter(s) (50 mL/Hr) IV Continuous <Continuous>  dextrose 5%. 1000 milliLiter(s) (100 mL/Hr) IV Continuous <Continuous>  dextrose 50% Injectable 25 Gram(s) IV Push once  dextrose 50% Injectable 12.5 Gram(s) IV Push once  dextrose 50% Injectable 25 Gram(s) IV Push once  fentaNYL   Infusion.. 1 MICROgram(s)/kG/Hr (6.81 mL/Hr) IV Continuous <Continuous>  glucagon  Injectable 1 milliGRAM(s) IntraMuscular once  heparin  Infusion 1900 Unit(s)/Hr (13 mL/Hr) IV Continuous <Continuous>  insulin lispro (ADMELOG) corrective regimen sliding scale   SubCutaneous every 6 hours  insulin NPH human recombinant 10 Unit(s) SubCutaneous every 6 hours  ipratropium 17 MICROgram(s) HFA Inhaler 2 Puff(s) Inhalation every 6 hours  ketamine Infusion. 0.25 mG/kG/Hr (3.4 mL/Hr) IV Continuous <Continuous>  levoFLOXacin IVPB 500 milliGRAM(s) IV Intermittent every 24 hours  midazolam Infusion 0.08 mG/kG/Hr (10.9 mL/Hr) IV Continuous <Continuous>  midodrine 10 milliGRAM(s) Oral every 8 hours  norepinephrine Infusion 0.05 MICROgram(s)/kG/Min (12.8 mL/Hr) IV Continuous <Continuous>  nystatin Powder 1 Application(s) Topical two times a day  pantoprazole  Injectable 40 milliGRAM(s) IV Push daily  petrolatum Ophthalmic Ointment 1 Application(s) Both EYES two times a day  Phoxillum Filtration BK 4 / 2.5 5000 milliLiter(s) (3000 mL/Hr) CRRT <Continuous>  Phoxillum Filtration BK 4 / 2.5 5000 milliLiter(s) (1000 mL/Hr) CRRT <Continuous>  polyethylene glycol 3350 17 Gram(s) Oral every 12 hours  PrismaSOL Filtration BGK 0 / 2.5 5000 milliLiter(s) (200 mL/Hr) CRRT <Continuous>    MEDICATIONS  (PRN):  acetaminophen    Suspension .. 650 milliGRAM(s) Oral every 6 hours PRN Temp greater or equal to 38C (100.4F)  sodium chloride 0.9% lock flush 10 milliLiter(s) IV Push every 1 hour PRN Pre/post blood products, medications, blood draw, and to maintain line patency    CAPILLARY BLOOD GLUCOSE      POCT Blood Glucose.: 154 mg/dL (06 Dec 2020 05:12)  POCT Blood Glucose.: 163 mg/dL (05 Dec 2020 22:39)  POCT Blood Glucose.: 156 mg/dL (05 Dec 2020 16:47)    I&O's Summary    05 Dec 2020 07:01  -  06 Dec 2020 07:00  --------------------------------------------------------  IN: 1970.8 mL / OUT: 1842 mL / NET: 128.8 mL        PHYSICAL EXAM:  Vital Signs Last 24 Hrs  T(C): 36.6 (06 Dec 2020 07:00), Max: 36.8 (05 Dec 2020 15:00)  T(F): 97.9 (06 Dec 2020 07:00), Max: 98.2 (05 Dec 2020 15:00)  HR: 98 (06 Dec 2020 07:00) (90 - 106)  BP: --  BP(mean): --  RR: 31 (06 Dec 2020 07:00) (30 - 46)  SpO2: 91% (06 Dec 2020 07:00) (87% - 100%)  CONSTITUTIONAL: NAD, well-developed, well-groomed  ENMT: Moist oral mucosa, no pharyngeal injection or exudates; normal dentition  RESPIRATORY: Normal respiratory effort; lungs are clear to auscultation bilaterally  CARDIOVASCULAR: Regular rate and rhythm, normal S1 and S2, no murmur/rub/gallop; No lower extremity edema; Peripheral pulses are 2+ bilaterally  ABDOMEN: Nontender to palpation, normoactive bowel sounds, no rebound/guarding; No hepatosplenomegaly  PSYCH: A+O to person, place, and time; affect appropriate  NEUROLOGY: CN 2-12 are intact and symmetric; no gross sensory deficits   SKIN: No rashes; no palpable lesions    LABS:                        7.5    38.33 )-----------( 222      ( 05 Dec 2020 22:56 )             24.3     12-06    135  |  101  |  12  ----------------------------<  122<H>  3.9   |  23  |  0.76    Ca    8.2<L>      06 Dec 2020 01:37  Phos  2.2     12-06  Mg     2.5     12-06    TPro  6.3  /  Alb  2.7<L>  /  TBili  0.9  /  DBili  x   /  AST  43<H>  /  ALT  9<L>  /  AlkPhos  74  12-06    PT/INR - ( 06 Dec 2020 01:37 )   PT: 15.8 sec;   INR: 1.33 ratio         PTT - ( 06 Dec 2020 01:37 )  PTT:145.5 sec          COVID-19 PCR: Detected (01 Dec 2020 17:41)  SARS-CoV-2: Detected (17 Nov 2020 22:56)      RADIOLOGY & ADDITIONAL TESTS:  Imaging from Last 24 Hours:    Electrocardiogram/QTc Interval:    COORDINATION OF CARE:  Care Discussed with Consultants/Other Providers:   Medicine Progress Note    60F w/pmhx AARON, HTN, 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 Reports that her  and son live with her and they have tested so far negative for covid and are asymptomatic, patient is not sure where was exposed. Reports trying her symbicort this week for shortness of breath without much relief. Patient yesterday began to have fevers with highest fever at 104F which alarmed her and for that reason she came to the hospital for help  (18 Nov 2020 05:46). Pt found to be COVID + and completed remdesivir 11/18-11/22. On 11/23 pt became hypoxic w/ increasign WOB and was intubated for airway protection and transferred to the COVID ICU. The patient was also proned that night for 18 hours. Hosp course was complicated by ATN which was unresponsive to bumex challenge. On 11/27 the pt was consented for CVVHDF. Her course was also complicated by MSSA/P. mirablis bacteremia potentially due to urine vs line-associated and MSSA in sputum due to PNA. Pt on antibiotics and antifungals for WBC in the 40s.  On 12/4 Pt proned 20 hours with improved in pO2. Now supine and FiO2 reduced to 60%. On 12/5 pt went to [facility] C/A/P due to persistently elevated WBCs in the setting of being on antibiotics and found to have a large cavitary consolidation in the lung.CTA Chest showed large cavitary consolidation which is likely source of elevated WBCs. FiO2 reduced to 40% and pt is doing well. Versed titrated down    SUBJECTIVE / OVERNIGHT EVENTS: Versed titrated off. Pt oxygenating well on 40% FiO2, Levo decreased to 0.02    ADDITIONAL REVIEW OF SYSTEMS: Unable to attain as pt is sedated.     MEDICATIONS  (STANDING):  ALBUTerol    90 MICROgram(s) HFA Inhaler 4 Puff(s) Inhalation every 6 hours  BACItracin   Ointment 1 Application(s) Topical two times a day  ceFAZolin   IVPB 2000 milliGRAM(s) IV Intermittent every 8 hours  chlorhexidine 0.12% Liquid 15 milliLiter(s) Oral Mucosa every 12 hours  chlorhexidine 4% Liquid 1 Application(s) Topical <User Schedule>  chlorhexidine 4% Liquid 1 Application(s) Topical <User Schedule>  CRRT Treatment    <Continuous>  dextrose 40% Gel 15 Gram(s) Oral once  dextrose 5%. 1000 milliLiter(s) (50 mL/Hr) IV Continuous <Continuous>  dextrose 5%. 1000 milliLiter(s) (100 mL/Hr) IV Continuous <Continuous>  dextrose 50% Injectable 25 Gram(s) IV Push once  dextrose 50% Injectable 12.5 Gram(s) IV Push once  dextrose 50% Injectable 25 Gram(s) IV Push once  fentaNYL   Infusion.. 1 MICROgram(s)/kG/Hr (6.81 mL/Hr) IV Continuous <Continuous>  glucagon  Injectable 1 milliGRAM(s) IntraMuscular once  heparin  Infusion 1900 Unit(s)/Hr (13 mL/Hr) IV Continuous <Continuous>  insulin lispro (ADMELOG) corrective regimen sliding scale   SubCutaneous every 6 hours  insulin NPH human recombinant 10 Unit(s) SubCutaneous every 6 hours  ipratropium 17 MICROgram(s) HFA Inhaler 2 Puff(s) Inhalation every 6 hours  ketamine Infusion. 0.25 mG/kG/Hr (3.4 mL/Hr) IV Continuous <Continuous>  levoFLOXacin IVPB 500 milliGRAM(s) IV Intermittent every 24 hours  midazolam Infusion 0.08 mG/kG/Hr (10.9 mL/Hr) IV Continuous <Continuous>  midodrine 10 milliGRAM(s) Oral every 8 hours  norepinephrine Infusion 0.05 MICROgram(s)/kG/Min (12.8 mL/Hr) IV Continuous <Continuous>  nystatin Powder 1 Application(s) Topical two times a day  pantoprazole  Injectable 40 milliGRAM(s) IV Push daily  petrolatum Ophthalmic Ointment 1 Application(s) Both EYES two times a day  Phoxillum Filtration BK 4 / 2.5 5000 milliLiter(s) (3000 mL/Hr) CRRT <Continuous>  Phoxillum Filtration BK 4 / 2.5 5000 milliLiter(s) (1000 mL/Hr) CRRT <Continuous>  polyethylene glycol 3350 17 Gram(s) Oral every 12 hours  PrismaSOL Filtration BGK 0 / 2.5 5000 milliLiter(s) (200 mL/Hr) CRRT <Continuous>    MEDICATIONS  (PRN):  acetaminophen    Suspension .. 650 milliGRAM(s) Oral every 6 hours PRN Temp greater or equal to 38C (100.4F)  sodium chloride 0.9% lock flush 10 milliLiter(s) IV Push every 1 hour PRN Pre/post blood products, medications, blood draw, and to maintain line patency    CAPILLARY BLOOD GLUCOSE      POCT Blood Glucose.: 154 mg/dL (06 Dec 2020 05:12)  POCT Blood Glucose.: 163 mg/dL (05 Dec 2020 22:39)  POCT Blood Glucose.: 156 mg/dL (05 Dec 2020 16:47)    I&O's Summary    05 Dec 2020 07:01  -  06 Dec 2020 07:00  --------------------------------------------------------  IN: 1970.8 mL / OUT: 1842 mL / NET: 128.8 mL    PHYSICAL EXAM:  Vital Signs Last 24 Hrs  T(C): 36.6 (06 Dec 2020 07:00), Max: 36.8 (05 Dec 2020 15:00)  T(F): 97.9 (06 Dec 2020 07:00), Max: 98.2 (05 Dec 2020 15:00)  HR: 98 (06 Dec 2020 07:00) (90 - 106)  RR: 31 (06 Dec 2020 07:00) (30 - 46)  SpO2: 91% (06 Dec 2020 07:00) (87% - 100%)  CONSTITUTIONAL: NAD, well-developed, well-groomed  ENMT: Moist oral mucosa, no pharyngeal injection or exudates; normal dentition  RESPIRATORY: Normal respiratory effort; lungs are clear to auscultation bilaterally  CARDIOVASCULAR: Regular rate and rhythm, normal S1 and S2, no murmur/rub/gallop; No lower extremity edema; Peripheral pulses are 2+ bilaterally  ABDOMEN: Nontender to palpation, normoactive bowel sounds, no rebound/guarding; No hepatosplenomegaly  PSYCH: A+O to person, place, and time; affect appropriate  NEUROLOGY: CN 2-12 are intact and symmetric; no gross sensory deficits   SKIN: No rashes; no palpable lesions    LABS:                        7.5    38.33 )-----------( 222      ( 05 Dec 2020 22:56 )             24.3     12-06    135  |  101  |  12  ----------------------------<  122<H>  3.9   |  23  |  0.76    Ca    8.2<L>      06 Dec 2020 01:37  Phos  2.2     12-06  Mg     2.5     12-06    TPro  6.3  /  Alb  2.7<L>  /  TBili  0.9  /  DBili  x   /  AST  43<H>  /  ALT  9<L>  /  AlkPhos  74  12-06    PT/INR - ( 06 Dec 2020 01:37 )   PT: 15.8 sec;   INR: 1.33 ratio      PTT - ( 06 Dec 2020 01:37 )  PTT:145.5 sec    COVID-19 PCR: Detected (01 Dec 2020 17:41)  SARS-CoV-2: Detected (17 Nov 2020 22:56)    RADIOLOGY & ADDITIONAL TESTS:  Imaging from Last 24 Hours: < from: CT Chest w/ IV Cont (12.04.20 @ 22:02) >  IMPRESSION:  New large consolidation with areas of cavitation in the right lung. Multifocal pneumonia with cavitation is a consideration.    No acute intra-abdominal pathology.    Electrocardiogram/QTc Interval: cElectrocardiogram/QTc Interval:  QTc 460    COORDINATION OF CARE:  Care Discussed with Consultants/Other Providers: Care discussed with infectious disease

## 2020-12-07 LAB
ALBUMIN SERPL ELPH-MCNC: 2.6 G/DL — LOW (ref 3.3–5)
ALP SERPL-CCNC: 77 U/L — SIGNIFICANT CHANGE UP (ref 40–120)
ALT FLD-CCNC: 5 U/L — LOW (ref 10–45)
AMMONIA BLD-MCNC: 52 UMOL/L — SIGNIFICANT CHANGE UP (ref 11–55)
ANION GAP SERPL CALC-SCNC: 13 MMOL/L — SIGNIFICANT CHANGE UP (ref 5–17)
APPEARANCE UR: ABNORMAL
APTT BLD: 77.9 SEC — HIGH (ref 27.5–35.5)
AST SERPL-CCNC: 47 U/L — HIGH (ref 10–40)
BACTERIA # UR AUTO: ABNORMAL
BILIRUB SERPL-MCNC: 0.9 MG/DL — SIGNIFICANT CHANGE UP (ref 0.2–1.2)
BILIRUB UR-MCNC: SIGNIFICANT CHANGE UP
BLD GP AB SCN SERPL QL: NEGATIVE — SIGNIFICANT CHANGE UP
BUN SERPL-MCNC: 29 MG/DL — HIGH (ref 7–23)
CALCIUM SERPL-MCNC: 8.8 MG/DL — SIGNIFICANT CHANGE UP (ref 8.4–10.5)
CHLORIDE SERPL-SCNC: 100 MMOL/L — SIGNIFICANT CHANGE UP (ref 96–108)
CO2 SERPL-SCNC: 19 MMOL/L — LOW (ref 22–31)
COLOR SPEC: SIGNIFICANT CHANGE UP
CREAT SERPL-MCNC: 1.96 MG/DL — HIGH (ref 0.5–1.3)
CRP SERPL-MCNC: 10.71 MG/DL — HIGH (ref 0–0.4)
D DIMER BLD IA.RAPID-MCNC: 3097 NG/ML DDU — HIGH
DIFF PNL FLD: SIGNIFICANT CHANGE UP
EPI CELLS # UR: SIGNIFICANT CHANGE UP
FERRITIN SERPL-MCNC: 1016 NG/ML — HIGH (ref 15–150)
GAS PNL BLDA: SIGNIFICANT CHANGE UP
GAS PNL BLDA: SIGNIFICANT CHANGE UP
GLUCOSE BLDC GLUCOMTR-MCNC: 113 MG/DL — HIGH (ref 70–99)
GLUCOSE BLDC GLUCOMTR-MCNC: 135 MG/DL — HIGH (ref 70–99)
GLUCOSE BLDC GLUCOMTR-MCNC: 142 MG/DL — HIGH (ref 70–99)
GLUCOSE BLDC GLUCOMTR-MCNC: 160 MG/DL — HIGH (ref 70–99)
GLUCOSE SERPL-MCNC: 160 MG/DL — HIGH (ref 70–99)
GLUCOSE UR QL: SIGNIFICANT CHANGE UP
HCT VFR BLD CALC: 22.4 % — LOW (ref 34.5–45)
HCT VFR BLD CALC: 23.9 % — LOW (ref 34.5–45)
HCT VFR BLD CALC: 25.8 % — LOW (ref 34.5–45)
HGB BLD-MCNC: 6.8 G/DL — CRITICAL LOW (ref 11.5–15.5)
HGB BLD-MCNC: 7.4 G/DL — LOW (ref 11.5–15.5)
HGB BLD-MCNC: 8.1 G/DL — LOW (ref 11.5–15.5)
HYALINE CASTS # UR AUTO: ABNORMAL /LPF
INR BLD: 1.34 RATIO — HIGH (ref 0.88–1.16)
KETONES UR-MCNC: SIGNIFICANT CHANGE UP
LDH SERPL L TO P-CCNC: 343 U/L — HIGH (ref 50–242)
LEUKOCYTE ESTERASE UR-ACNC: SIGNIFICANT CHANGE UP
MAGNESIUM SERPL-MCNC: 2.5 MG/DL — SIGNIFICANT CHANGE UP (ref 1.6–2.6)
MCHC RBC-ENTMCNC: 28.5 PG — SIGNIFICANT CHANGE UP (ref 27–34)
MCHC RBC-ENTMCNC: 28.7 PG — SIGNIFICANT CHANGE UP (ref 27–34)
MCHC RBC-ENTMCNC: 29 PG — SIGNIFICANT CHANGE UP (ref 27–34)
MCHC RBC-ENTMCNC: 30.4 GM/DL — LOW (ref 32–36)
MCHC RBC-ENTMCNC: 31 GM/DL — LOW (ref 32–36)
MCHC RBC-ENTMCNC: 31.4 GM/DL — LOW (ref 32–36)
MCV RBC AUTO: 91.9 FL — SIGNIFICANT CHANGE UP (ref 80–100)
MCV RBC AUTO: 92.5 FL — SIGNIFICANT CHANGE UP (ref 80–100)
MCV RBC AUTO: 94.5 FL — SIGNIFICANT CHANGE UP (ref 80–100)
NITRITE UR-MCNC: SIGNIFICANT CHANGE UP
NRBC # BLD: 1 /100 WBCS — HIGH (ref 0–0)
NRBC # BLD: 3 /100 WBCS — HIGH (ref 0–0)
NRBC # BLD: 6 /100 WBCS — HIGH (ref 0–0)
PH UR: SIGNIFICANT CHANGE UP (ref 5–8)
PHOSPHATE SERPL-MCNC: 3.8 MG/DL — SIGNIFICANT CHANGE UP (ref 2.5–4.5)
PLATELET # BLD AUTO: 152 K/UL — SIGNIFICANT CHANGE UP (ref 150–400)
PLATELET # BLD AUTO: 164 K/UL — SIGNIFICANT CHANGE UP (ref 150–400)
PLATELET # BLD AUTO: 190 K/UL — SIGNIFICANT CHANGE UP (ref 150–400)
POTASSIUM SERPL-MCNC: 4.2 MMOL/L — SIGNIFICANT CHANGE UP (ref 3.5–5.3)
POTASSIUM SERPL-SCNC: 4.2 MMOL/L — SIGNIFICANT CHANGE UP (ref 3.5–5.3)
PROT SERPL-MCNC: 5.9 G/DL — LOW (ref 6–8.3)
PROT UR-MCNC: SIGNIFICANT CHANGE UP
PROTHROM AB SERPL-ACNC: 15.9 SEC — HIGH (ref 10.6–13.6)
RBC # BLD: 2.37 M/UL — LOW (ref 3.8–5.2)
RBC # BLD: 2.6 M/UL — LOW (ref 3.8–5.2)
RBC # BLD: 2.79 M/UL — LOW (ref 3.8–5.2)
RBC # FLD: 19.9 % — HIGH (ref 10.3–14.5)
RBC # FLD: 20.2 % — HIGH (ref 10.3–14.5)
RBC # FLD: 21.3 % — HIGH (ref 10.3–14.5)
RBC CASTS # UR COMP ASSIST: >50 /HPF — SIGNIFICANT CHANGE UP (ref 0–4)
RH IG SCN BLD-IMP: POSITIVE — SIGNIFICANT CHANGE UP
SODIUM SERPL-SCNC: 132 MMOL/L — LOW (ref 135–145)
SP GR SPEC: 1.03 — HIGH (ref 1.01–1.02)
UROBILINOGEN FLD QL: SIGNIFICANT CHANGE UP
WBC # BLD: 18.65 K/UL — HIGH (ref 3.8–10.5)
WBC # BLD: 18.83 K/UL — HIGH (ref 3.8–10.5)
WBC # BLD: 26.3 K/UL — HIGH (ref 3.8–10.5)
WBC # FLD AUTO: 18.65 K/UL — HIGH (ref 3.8–10.5)
WBC # FLD AUTO: 18.83 K/UL — HIGH (ref 3.8–10.5)
WBC # FLD AUTO: 26.3 K/UL — HIGH (ref 3.8–10.5)
WBC UR QL: SIGNIFICANT CHANGE UP

## 2020-12-07 PROCEDURE — 93308 TTE F-UP OR LMTD: CPT | Mod: 26

## 2020-12-07 PROCEDURE — 99233 SBSQ HOSP IP/OBS HIGH 50: CPT

## 2020-12-07 PROCEDURE — 99291 CRITICAL CARE FIRST HOUR: CPT

## 2020-12-07 PROCEDURE — 99233 SBSQ HOSP IP/OBS HIGH 50: CPT | Mod: GC

## 2020-12-07 PROCEDURE — 93970 EXTREMITY STUDY: CPT | Mod: 26

## 2020-12-07 PROCEDURE — 93321 DOPPLER ECHO F-UP/LMTD STD: CPT | Mod: 26

## 2020-12-07 RX ORDER — BUMETANIDE 0.25 MG/ML
2 INJECTION INTRAMUSCULAR; INTRAVENOUS ONCE
Refills: 0 | Status: COMPLETED | OUTPATIENT
Start: 2020-12-07 | End: 2020-12-07

## 2020-12-07 RX ORDER — BUMETANIDE 0.25 MG/ML
2 INJECTION INTRAMUSCULAR; INTRAVENOUS
Refills: 0 | Status: COMPLETED | OUTPATIENT
Start: 2020-12-07 | End: 2020-12-07

## 2020-12-07 RX ORDER — MIDODRINE HYDROCHLORIDE 2.5 MG/1
20 TABLET ORAL EVERY 8 HOURS
Refills: 0 | Status: DISCONTINUED | OUTPATIENT
Start: 2020-12-07 | End: 2020-12-09

## 2020-12-07 RX ORDER — HEPARIN SODIUM 5000 [USP'U]/ML
1300 INJECTION INTRAVENOUS; SUBCUTANEOUS
Qty: 25000 | Refills: 0 | Status: ACTIVE | OUTPATIENT
Start: 2020-12-07 | End: 2021-11-05

## 2020-12-07 RX ORDER — BUMETANIDE 0.25 MG/ML
2 INJECTION INTRAMUSCULAR; INTRAVENOUS
Qty: 20 | Refills: 0 | Status: DISCONTINUED | OUTPATIENT
Start: 2020-12-07 | End: 2020-12-08

## 2020-12-07 RX ORDER — CEFAZOLIN SODIUM 1 G
2000 VIAL (EA) INJECTION EVERY 24 HOURS
Refills: 0 | Status: ACTIVE | OUTPATIENT
Start: 2020-12-07 | End: 2020-12-11

## 2020-12-07 RX ORDER — BUMETANIDE 0.25 MG/ML
4 INJECTION INTRAMUSCULAR; INTRAVENOUS ONCE
Refills: 0 | Status: DISCONTINUED | OUTPATIENT
Start: 2020-12-07 | End: 2020-12-07

## 2020-12-07 RX ADMIN — MIDODRINE HYDROCHLORIDE 20 MILLIGRAM(S): 2.5 TABLET ORAL at 17:52

## 2020-12-07 RX ADMIN — Medication 100 MILLIGRAM(S): at 23:35

## 2020-12-07 RX ADMIN — Medication 1: at 05:01

## 2020-12-07 RX ADMIN — HEPARIN SODIUM 13 UNIT(S)/HR: 5000 INJECTION INTRAVENOUS; SUBCUTANEOUS at 23:34

## 2020-12-07 RX ADMIN — Medication 100 MILLIGRAM(S): at 14:31

## 2020-12-07 RX ADMIN — HUMAN INSULIN 10 UNIT(S): 100 INJECTION, SUSPENSION SUBCUTANEOUS at 23:56

## 2020-12-07 RX ADMIN — ALBUTEROL 4 PUFF(S): 90 AEROSOL, METERED ORAL at 06:35

## 2020-12-07 RX ADMIN — Medication 2 PUFF(S): at 06:35

## 2020-12-07 RX ADMIN — Medication 1 APPLICATION(S): at 18:17

## 2020-12-07 RX ADMIN — Medication 2 PUFF(S): at 23:07

## 2020-12-07 RX ADMIN — MIDODRINE HYDROCHLORIDE 10 MILLIGRAM(S): 2.5 TABLET ORAL at 05:02

## 2020-12-07 RX ADMIN — PANTOPRAZOLE SODIUM 40 MILLIGRAM(S): 20 TABLET, DELAYED RELEASE ORAL at 12:32

## 2020-12-07 RX ADMIN — NYSTATIN CREAM 1 APPLICATION(S): 100000 CREAM TOPICAL at 05:00

## 2020-12-07 RX ADMIN — CHLORHEXIDINE GLUCONATE 1 APPLICATION(S): 213 SOLUTION TOPICAL at 05:03

## 2020-12-07 RX ADMIN — HUMAN INSULIN 10 UNIT(S): 100 INJECTION, SUSPENSION SUBCUTANEOUS at 11:23

## 2020-12-07 RX ADMIN — DEXMEDETOMIDINE HYDROCHLORIDE IN 0.9% SODIUM CHLORIDE 6.81 MICROGRAM(S)/KG/HR: 4 INJECTION INTRAVENOUS at 11:23

## 2020-12-07 RX ADMIN — Medication 1 APPLICATION(S): at 05:01

## 2020-12-07 RX ADMIN — POLYETHYLENE GLYCOL 3350 17 GRAM(S): 17 POWDER, FOR SOLUTION ORAL at 05:00

## 2020-12-07 RX ADMIN — CHLORHEXIDINE GLUCONATE 1 APPLICATION(S): 213 SOLUTION TOPICAL at 12:35

## 2020-12-07 RX ADMIN — Medication 1 APPLICATION(S): at 05:00

## 2020-12-07 RX ADMIN — Medication 0: at 11:23

## 2020-12-07 RX ADMIN — HUMAN INSULIN 10 UNIT(S): 100 INJECTION, SUSPENSION SUBCUTANEOUS at 17:51

## 2020-12-07 RX ADMIN — Medication 1 APPLICATION(S): at 18:18

## 2020-12-07 RX ADMIN — BUMETANIDE 2 MILLIGRAM(S): 0.25 INJECTION INTRAMUSCULAR; INTRAVENOUS at 03:39

## 2020-12-07 RX ADMIN — POLYETHYLENE GLYCOL 3350 17 GRAM(S): 17 POWDER, FOR SOLUTION ORAL at 18:17

## 2020-12-07 RX ADMIN — BUMETANIDE 2 MILLIGRAM(S): 0.25 INJECTION INTRAMUSCULAR; INTRAVENOUS at 12:31

## 2020-12-07 RX ADMIN — BUMETANIDE 10 MG/HR: 0.25 INJECTION INTRAMUSCULAR; INTRAVENOUS at 18:25

## 2020-12-07 RX ADMIN — ALBUTEROL 4 PUFF(S): 90 AEROSOL, METERED ORAL at 18:40

## 2020-12-07 RX ADMIN — Medication 100 MILLIGRAM(S): at 05:30

## 2020-12-07 RX ADMIN — NYSTATIN CREAM 1 APPLICATION(S): 100000 CREAM TOPICAL at 18:19

## 2020-12-07 RX ADMIN — Medication 2 PUFF(S): at 18:41

## 2020-12-07 RX ADMIN — HUMAN INSULIN 10 UNIT(S): 100 INJECTION, SUSPENSION SUBCUTANEOUS at 05:02

## 2020-12-07 RX ADMIN — BUMETANIDE 2 MILLIGRAM(S): 0.25 INJECTION INTRAMUSCULAR; INTRAVENOUS at 12:27

## 2020-12-07 RX ADMIN — Medication 2 PUFF(S): at 11:50

## 2020-12-07 RX ADMIN — CHLORHEXIDINE GLUCONATE 15 MILLILITER(S): 213 SOLUTION TOPICAL at 17:53

## 2020-12-07 RX ADMIN — ALBUTEROL 4 PUFF(S): 90 AEROSOL, METERED ORAL at 23:07

## 2020-12-07 RX ADMIN — CHLORHEXIDINE GLUCONATE 15 MILLILITER(S): 213 SOLUTION TOPICAL at 05:01

## 2020-12-07 RX ADMIN — ALBUTEROL 4 PUFF(S): 90 AEROSOL, METERED ORAL at 11:51

## 2020-12-07 NOTE — PROCEDURE NOTE - NSPOSTCAREGUIDE_GEN_A_CORE
Verbal/written post procedure instructions were given to patient/caregiver
Care for catheter as per unit/ICU protocols
Keep the cast/splint/dressing clean and dry/Care for catheter as per unit/ICU protocols

## 2020-12-07 NOTE — PROGRESS NOTE ADULT - SUBJECTIVE AND OBJECTIVE BOX
Follow Up:  COVID19, MSSA Bacteremia, Proteus Bacteremia, Fever, Stenotrophomonas PNA    Interval History:    REVIEW OF SYSTEMS  [  ] ROS unobtainable because:    [  ] All other systems negative except as noted below:     Constitutional:  [ ] fever [ ] chills  [ ] weight loss  [ ] weakness  Skin:  [ ] rash [ ] phlebitis	  Eyes: [ ] icterus [ ] pain  [ ] discharge	  ENMT: [ ] sore throat  [ ] thrush [ ] ulcers [ ] exudates  Respiratory: [  ] dyspnea [ ] hemoptysis [ ] cough [ ] sputum	  Cardiovascular:  [ ] chest pain [ ] palpitations [ ] edema	  Gastrointestinal:  [ ] nausea [ ] vomiting [ ] diarrhea [ ] constipation [ ] pain	  Genitourinary:  [ ] dysuria [ ] frequency [ ] hematuria [ ] discharge [ ] flank pain  [ ] incontinence  Musculoskeletal:  [ ] myalgias [ ] arthralgias [ ] arthritis  [ ] back pain  Neurological:  [ ] headache [ ] seizures  [ ] confusion/altered mental status    Allergies  Kiwi (Unknown)  latex (Anaphylaxis)  latex (Unknown)  penicillin (Other)  penicillin (Unknown)  perfume  hives (Other)  potassium acetate (Other)  soap additives hives (Other)        ANTIMICROBIALS:  ceFAZolin   IVPB 2000 every 8 hours  levoFLOXacin IVPB 500 every 24 hours      OTHER MEDS:  MEDICATIONS  (STANDING):  acetaminophen    Suspension .. 650 every 6 hours PRN  ALBUTerol    90 MICROgram(s) HFA Inhaler 4 every 6 hours  buMETAnide Infusion 2 <Continuous>  dexMEDEtomidine Infusion 0.2 <Continuous>  dextrose 40% Gel 15 once  dextrose 50% Injectable 25 once  dextrose 50% Injectable 12.5 once  dextrose 50% Injectable 25 once  glucagon  Injectable 1 once  insulin lispro (ADMELOG) corrective regimen sliding scale  every 6 hours  insulin NPH human recombinant 10 every 6 hours  ipratropium 17 MICROgram(s) HFA Inhaler 2 every 6 hours  midodrine 20 every 8 hours  pantoprazole  Injectable 40 daily  polyethylene glycol 3350 17 every 12 hours      Vital Signs Last 24 Hrs  T(C): 36.2 (07 Dec 2020 14:00), Max: 38.6 (06 Dec 2020 20:00)  T(F): 97.2 (07 Dec 2020 14:00), Max: 101.5 (06 Dec 2020 20:00)  HR: 85 (07 Dec 2020 14:00) (71 - 132)  BP: --  BP(mean): --  RR: 34 (07 Dec 2020 14:00) (33 - 46)  SpO2: 96% (07 Dec 2020 14:00) (91% - 100%)    PHYSICAL EXAMINATION:    General: Alert and Awake, NAD  HEENT: PERRL, EOMI  Neck: Supple  Cardiac: RRR, No M/R/G  Resp: CTAB, No Wh/Rh/Ra  Abdomen: NBS, NT/ND, No HSM, No rigidity or guarding  MSK: No LE edema. No Calf tenderness  : No negro  Skin: No rashes or lesions. Skin is warm and dry to the touch.   Neuro: Alert and Awake. CN 2-12 Grossly intact. Moves all four extremities spontaneously.  Psych: Calm, Pleasant, Cooperative    LABORATORY:                          7.4    18.83 )-----------( 152      ( 07 Dec 2020 10:32 )             23.9       12-    132<L>  |  100  |  29<H>  ----------------------------<  160<H>  4.2   |  19<L>  |  1.96<H>    Ca    8.8      07 Dec 2020 00:38  Phos  3.8     -  Mg     2.5         TPro  5.9<L>  /  Alb  2.6<L>  /  TBili  0.9  /  DBili  x   /  AST  47<H>  /  ALT  5<L>  /  AlkPhos  77  12-      Urinalysis Basic - ( 06 Dec 2020 22:31 )    Color: Dark Brown / Appearance: Turbid / S.032 / pH: x  Gluc: x / Ketone: SEE NOTE  / Bili: SEE NOTE / Urobili: SEE NOTE   Blood: x / Protein: SEE NOTE / Nitrite: SEE NOTE   Leuk Esterase: SEE NOTE / RBC: >50 /hpf / WBC 3-5   Sq Epi: x / Non Sq Epi: Occasional / Bacteria: Few    C-Reactive Protein, Serum: 10.71 mg/dL (20 @ 00:38)  C-Reactive Protein, Serum: 15.05 mg/dL (20 @ 01:07)  C-Reactive Protein, Serum: 17.22 mg/dL (20 @ 00:41)    Ferritin, Serum: 1016 ng/mL (20 @ 07:03)  Ferritin, Serum: 1879 ng/mL (20 @ 10:06)  Ferritin, Serum: 1416 ng/mL (20 @ 05:20)    D-Dimer Assay, Quantitative: 3097 ng/mL DDU (20 @ 00:38)  D-Dimer Assay, Quantitative: 2641 ng/mL DDU (20 @ 01:07)  D-Dimer Assay, Quantitative: 2006 ng/mL DDU (20 @ 00:41)    MICROBIOLOGY:    .Sputum Sputum  20   Moderate Stenotrophomonas maltophilia  Normal Respiratory Jocelynn present  --  Stenotrophomonas maltophilia      .Blood Blood-Peripheral  20   No Growth Final  --  --      .Urine Clean Catch (Midstream)  20   <10,000 CFU/mL Normal Urogenital Jocelynn  --  --      .Blood Blood-Peripheral  20   Growth in aerobic and anaerobic bottles: Staphylococcus aureus See  previous culture [id number]  --    Growth in aerobic bottle: Gram Positive Cocci in Clusters  Growth in anaerobic bottle: Gram Positive Cocci in Clusters      .Blood Blood-Peripheral  20   Growth in aerobic and anaerobic bottles: Staphylococcus aureus  Growth in anaerobic bottle: Staphylococcus hominis  Coag Negative Staphylococcus  Single set isolate, possible contaminant. Contact  Microbiology if susceptibility testing clinically  indicated.  ***Blood Panel PCR results on this specimen are available  approximately 3 hours after the Gram stain result.***  Gram stain, PCR, and/or culture results may not always  correspond due to difference in methodologies.  ************************************************************  This PCR assay was performed using [employer].  The following targets are tested for: Enterococcus,  vancomycin resistant enterococci, Listeria monocytogenes,  coagulase negative staphylococci, S. aureus,  methicillin resistant S. aureus, Streptococcus agalactiae  (Group B), S. pneumoniae, S. pyogenes (Group A),  Acinetobacter baumannii, Enterobacter cloacae, E. coli,  Klebsiella oxytoca, K. pneumoniae, Proteus sp.,  Serratia marcescens, Haemophilus influenzae,  Neisseria meningitidis, Pseudomonas aeruginosa, Candida  albicans, C. glabrata, C krusei, C parapsilosis,  C. tropicalis and the KPC resistance gene.  --  Blood Culture PCR  Staphylococcus aureus      .Sputum  20   Numerous Staphylococcus aureus  Normal Respiratory Jocelynn absent  --  Staphylococcus aureus      .Sputum Sputum  20   Moderate Staphylococcus aureus  Normal Respiratory Jocelynn absent  --  Staphylococcus aureus      .Blood Blood-Peripheral  20   No Growth Final  --  --                RADIOLOGY:    <The imaging below has been reviewed and visualized by me independently. Findings as detailed in report below> Follow Up:  COVID19, MSSA Bacteremia, Proteus Bacteremia, Fever, Stenotrophomonas PNA    Interval History: Febrile yesterday evening. per RN patient with thick secretions suctioned from ETT. No BM. Off vasopressors but on midodrine    REVIEW OF SYSTEMS  [  x] ROS unobtainable because:  intubated and sedated  [  ] All other systems negative except as noted below:     Constitutional:  [ ] fever [ ] chills  [ ] weight loss  [ ] weakness  Skin:  [ ] rash [ ] phlebitis	  Eyes: [ ] icterus [ ] pain  [ ] discharge	  ENMT: [ ] sore throat  [ ] thrush [ ] ulcers [ ] exudates  Respiratory: [  ] dyspnea [ ] hemoptysis [ ] cough [ ] sputum	  Cardiovascular:  [ ] chest pain [ ] palpitations [ ] edema	  Gastrointestinal:  [ ] nausea [ ] vomiting [ ] diarrhea [ ] constipation [ ] pain	  Genitourinary:  [ ] dysuria [ ] frequency [ ] hematuria [ ] discharge [ ] flank pain  [ ] incontinence  Musculoskeletal:  [ ] myalgias [ ] arthralgias [ ] arthritis  [ ] back pain  Neurological:  [ ] headache [ ] seizures  [ ] confusion/altered mental status    Allergies  Kiwi (Unknown)  latex (Anaphylaxis)  latex (Unknown)  penicillin (Other)  penicillin (Unknown)  perfume  hives (Other)  potassium acetate (Other)  soap additives hives (Other)        ANTIMICROBIALS:  ceFAZolin   IVPB 2000 every 8 hours  levoFLOXacin IVPB 500 every 24 hours      OTHER MEDS:  MEDICATIONS  (STANDING):  acetaminophen    Suspension .. 650 every 6 hours PRN  ALBUTerol    90 MICROgram(s) HFA Inhaler 4 every 6 hours  buMETAnide Infusion 2 <Continuous>  dexMEDEtomidine Infusion 0.2 <Continuous>  dextrose 40% Gel 15 once  dextrose 50% Injectable 25 once  dextrose 50% Injectable 12.5 once  dextrose 50% Injectable 25 once  glucagon  Injectable 1 once  insulin lispro (ADMELOG) corrective regimen sliding scale  every 6 hours  insulin NPH human recombinant 10 every 6 hours  ipratropium 17 MICROgram(s) HFA Inhaler 2 every 6 hours  midodrine 20 every 8 hours  pantoprazole  Injectable 40 daily  polyethylene glycol 3350 17 every 12 hours      Vital Signs Last 24 Hrs  T(C): 36.2 (07 Dec 2020 14:00), Max: 38.6 (06 Dec 2020 20:00)  T(F): 97.2 (07 Dec 2020 14:00), Max: 101.5 (06 Dec 2020 20:00)  HR: 85 (07 Dec 2020 14:00) (71 - 132)  BP: --  BP(mean): --  RR: 34 (07 Dec 2020 14:00) (33 - 46)  SpO2: 96% (07 Dec 2020 14:00) (91% - 100%)    PHYSICAL EXAMINATION:  General: Intubated and Sedated  HEENT: +ETT  Neck: Supple  Cardiac: RRR, No M/R/G  Resp: CTAB, No Wh/Rh/Ra  Abdomen: NBS, NT/ND, No HSM, No rigidity or guarding  MSK: No LE edema. No Calf tenderness  : Rivera  Skin: No rashes or lesions. Skin is warm and dry to the touch.   Vascular: RIJ and LIJ (No surrounding erythema, drainage or tenderness to palpation)  Neuro: Intubated and Sedated  Psych: Unable to assess - intubated and sedated    LABORATORY:                          7.4    18.83 )-----------( 152      ( 07 Dec 2020 10:32 )             23.9           132<L>  |  100  |  29<H>  ----------------------------<  160<H>  4.2   |  19<L>  |  1.96<H>    Ca    8.8      07 Dec 2020 00:38  Phos  3.8       Mg     2.5         TPro  5.9<L>  /  Alb  2.6<L>  /  TBili  0.9  /  DBili  x   /  AST  47<H>  /  ALT  5<L>  /  AlkPhos  77  12-    Urinalysis Basic - ( 06 Dec 2020 22:31 )    Color: Dark Brown / Appearance: Turbid / S.032 / pH: x  Gluc: x / Ketone: SEE NOTE  / Bili: SEE NOTE / Urobili: SEE NOTE   Blood: x / Protein: SEE NOTE / Nitrite: SEE NOTE   Leuk Esterase: SEE NOTE / RBC: >50 /hpf / WBC 3-5   Sq Epi: x / Non Sq Epi: Occasional / Bacteria: Few    C-Reactive Protein, Serum: 10.71 mg/dL (- @ 00:38)  C-Reactive Protein, Serum: 15.05 mg/dL (20 @ 01:07)  C-Reactive Protein, Serum: 17.22 mg/dL (20 @ 00:41)    Ferritin, Serum: 1016 ng/mL (20 @ 07:03)  Ferritin, Serum: 1879 ng/mL (20 @ 10:06)  Ferritin, Serum: 1416 ng/mL (20 @ 05:20)    D-Dimer Assay, Quantitative: 3097 ng/mL DDU (20 @ 00:38)  D-Dimer Assay, Quantitative: 2641 ng/mL DDU (20 @ 01:07)  D-Dimer Assay, Quantitative: 2006 ng/mL DDU (20 @ 00:41)    MICROBIOLOGY:    .Sputum Sputum  20   Moderate Stenotrophomonas maltophilia  Normal Respiratory Jocelynn present  --  Stenotrophomonas maltophilia      .Blood Blood-Peripheral  20   No Growth Final  --  --      .Urine Clean Catch (Midstream)  20   <10,000 CFU/mL Normal Urogenital Jocelynn  --  --      .Blood Blood-Peripheral  20   Growth in aerobic and anaerobic bottles: Staphylococcus aureus See  previous culture [id number]  --    Growth in aerobic bottle: Gram Positive Cocci in Clusters  Growth in anaerobic bottle: Gram Positive Cocci in Clusters      .Blood Blood-Peripheral  20   Growth in aerobic and anaerobic bottles: Staphylococcus aureus  Growth in anaerobic bottle: Staphylococcus hominis  Coag Negative Staphylococcus  Single set isolate, possible contaminant. Contact  Microbiology if susceptibility testing clinically  indicated.  ***Blood Panel PCR results on this specimen are available  approximately 3 hours after the Gram stain result.***  Gram stain, PCR, and/or culture results may not always  correspond due to difference in methodologies.  ************************************************************  This PCR assay was performed using [employer].  The following targets are tested for: Enterococcus,  vancomycin resistant enterococci, Listeria monocytogenes,  coagulase negative staphylococci, S. aureus,  methicillin resistant S. aureus, Streptococcus agalactiae  (Group B), S. pneumoniae, S. pyogenes (Group A),  Acinetobacter baumannii, Enterobacter cloacae, E. coli,  Klebsiella oxytoca, K. pneumoniae, Proteus sp.,  Serratia marcescens, Haemophilus influenzae,  Neisseria meningitidis, Pseudomonas aeruginosa, Candida  albicans, C. glabrata, C krusei, C parapsilosis,  C. tropicalis and the KPC resistance gene.  --  Blood Culture PCR  Staphylococcus aureus      .Sputum  20   Numerous Staphylococcus aureus  Normal Respiratory Jocelynn absent  --  Staphylococcus aureus      .Sputum Sputum  20   Moderate Staphylococcus aureus  Normal Respiratory Jocelynn absent  --  Staphylococcus aureus      .Blood Blood-Peripheral  20   No Growth Final  --  --    RADIOLOGY:    <The imaging below has been reviewed and visualized by me independently. Findings as detailed in report below>    EXAM:  CT ABDOMEN AND PELVIS IC                        EXAM:  CT CHEST IC                        PROCEDURE DATE:  2020    New large consolidation with areas of cavitation in the right lung. Multifocal pneumonia with cavitation is a consideration.  No acute intra-abdominal pathology.

## 2020-12-07 NOTE — PROGRESS NOTE ADULT - SUBJECTIVE AND OBJECTIVE BOX
Patient is a 60y old  Female who presents with a chief complaint of Acute hypoxemic respiratory failure 2/2 covid19 (06 Dec 2020 07:39)      INTERVAL HPI/OVERNIGHT EVENTS:  hgb 6.8 s/p 1 PRBC . heparin gtt on hold. s/p Bumex IVP without effect .     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 Reports that her  and son live with her and they have tested so far negative for covid and are asymptomatic, patient is not sure where was exposed. Reports trying her symbicort this week for shortness of breath without much relief. Patient yesterday began to have fevers with highest fever at 104F which alarmed her and for that reason she came to the hospital for help  (2020 05:46). Pt found to be COVID + and completed remdesivir -. On  pt became hypoxic w/ increasign WOB and was intubated for airway protection and transferred to the COVID ICU. The patient was also proned that night for 18 hours. Hosp course was complicated by ATN which was unresponsive to bumex challenge. On  the pt was consented for CVVHDF. Her course was also complicated by MSSA/P. mirablis bacteremia potentially due to urine vs line-associated and MSSA in sputum due to PNA. Pt on antibiotics and antifungals for WBC in the 40s.  On  Pt proned 20 hours with improved in pO2. Now supine and FiO2 reduced to 60%. On  pt went to CTA C/A/P due to persistently elevated WBCs in the setting of being on antibiotics and found to have R side  large cavitary lesions ; on Ancef 12/3 and Levaquin  w/ plan for 6 week tx.       ROS:  Unable to ROS as pt is not responding .     Allergies    Kiwi (Unknown)  latex (Anaphylaxis)  latex (Unknown)  penicillin (Other)  penicillin (Unknown)  perfume  hives (Other)  potassium acetate (Other)  soap additives hives (Other)    Intolerances        ANTIBIOTICS/RELEVANT:  antimicrobials  ceFAZolin   IVPB 2000 milliGRAM(s) IV Intermittent every 8 hours  levoFLOXacin IVPB 500 milliGRAM(s) IV Intermittent every 24 hours    immunologic:    OTHER:  acetaminophen    Suspension .. 650 milliGRAM(s) Oral every 6 hours PRN  ALBUTerol    90 MICROgram(s) HFA Inhaler 4 Puff(s) Inhalation every 6 hours  BACItracin   Ointment 1 Application(s) Topical two times a day  buMETAnide Infusion 2 mG/Hr IV Continuous <Continuous>  buMETAnide Injectable 4 milliGRAM(s) IV Push once  chlorhexidine 0.12% Liquid 15 milliLiter(s) Oral Mucosa every 12 hours  chlorhexidine 4% Liquid 1 Application(s) Topical <User Schedule>  chlorhexidine 4% Liquid 1 Application(s) Topical <User Schedule>  dexMEDEtomidine Infusion 0.2 MICROgram(s)/kG/Hr IV Continuous <Continuous>  dextrose 40% Gel 15 Gram(s) Oral once  dextrose 5%. 1000 milliLiter(s) IV Continuous <Continuous>  dextrose 5%. 1000 milliLiter(s) IV Continuous <Continuous>  dextrose 50% Injectable 25 Gram(s) IV Push once  dextrose 50% Injectable 12.5 Gram(s) IV Push once  dextrose 50% Injectable 25 Gram(s) IV Push once  glucagon  Injectable 1 milliGRAM(s) IntraMuscular once  insulin lispro (ADMELOG) corrective regimen sliding scale   SubCutaneous every 6 hours  insulin NPH human recombinant 10 Unit(s) SubCutaneous every 6 hours  ipratropium 17 MICROgram(s) HFA Inhaler 2 Puff(s) Inhalation every 6 hours  midodrine 10 milliGRAM(s) Oral every 8 hours  norepinephrine Infusion 0.05 MICROgram(s)/kG/Min IV Continuous <Continuous>  nystatin Powder 1 Application(s) Topical two times a day  pantoprazole  Injectable 40 milliGRAM(s) IV Push daily  petrolatum Ophthalmic Ointment 1 Application(s) Both EYES two times a day  polyethylene glycol 3350 17 Gram(s) Oral every 12 hours  sodium chloride 0.9% lock flush 10 milliLiter(s) IV Push every 1 hour PRN      Objective:  Vital Signs Last 24 Hrs  T(C): 35.4 (07 Dec 2020 10:00), Max: 38.6 (06 Dec 2020 20:00)  T(F): 95.7 (07 Dec 2020 10:00), Max: 101.5 (06 Dec 2020 20:00)  HR: 74 (07 Dec 2020 10:00) (74 - 132)  RR: 34 (07 Dec 2020 10:00) (30 - 41)  SpO2: 95% (07 Dec 2020 10:00) (93% - 100%)    PHYSICAL EXAM:  General -   HEENT -   CV -   Resp -   Abdomen -   Extremities -   Skin -       LABS:                        6.8    26.30 )-----------( 190      ( 07 Dec 2020 00:38 )             22.4     12-    132<L>  |  100  |  29<H>  ----------------------------<  160<H>  4.2   |  19<L>  |  1.96<H>    Ca    8.8      07 Dec 2020 00:38  Phos  3.8     12-07  Mg     2.5     12-07    TPro  5.9<L>  /  Alb  2.6<L>  /  TBili  0.9  /  DBili  x   /  AST  47<H>  /  ALT  5<L>  /  AlkPhos  77  12-07    PT/INR - ( 07 Dec 2020 00:38 )   PT: 15.9 sec;   INR: 1.34 ratio         PTT - ( 07 Dec 2020 00:38 )  PTT:77.9 sec  Urinalysis Basic - ( 06 Dec 2020 22:31 )    Color: Dark Brown / Appearance: Turbid / S.032 / pH: x  Gluc: x / Ketone: SEE NOTE  / Bili: SEE NOTE / Urobili: SEE NOTE   Blood: x / Protein: SEE NOTE / Nitrite: SEE NOTE   Leuk Esterase: SEE NOTE / RBC: >50 /hpf / WBC 3-5   Sq Epi: x / Non Sq Epi: Occasional / Bacteria: Few          MICROBIOLOGY:        RADIOLOGY & ADDITIONAL STUDIES: Patient is a 60y old  Female who presents with a chief complaint of Acute hypoxemic respiratory failure 2/2 covid19 (06 Dec 2020 07:39)      INTERVAL HPI/OVERNIGHT EVENTS:  hgb 6.8 s/p 1 PRBC . heparin gtt on hold. s/p Bumex IVP without effect .     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 Reports that her  and son live with her and they have tested so far negative for covid and are asymptomatic, patient is not sure where was exposed. Reports trying her symbicort this week for shortness of breath without much relief. Patient yesterday began to have fevers with highest fever at 104F which alarmed her and for that reason she came to the hospital for help  (2020 05:46). Pt found to be COVID + and completed remdesivir -. On  pt became hypoxic w/ increasign WOB and was intubated for airway protection and transferred to the COVID ICU. The patient was also proned that night for 18 hours. Hosp course was complicated by ATN which was unresponsive to bumex challenge. On  the pt was consented for CVVHDF. Her course was also complicated by MSSA/P. mirablis bacteremia potentially due to urine vs line-associated and MSSA in sputum due to PNA. Pt on antibiotics and antifungals for WBC in the 40s.  On  Pt proned 20 hours with improved in pO2. Now supine and FiO2 reduced to 60%. On  pt went to CTA C/A/P due to persistently elevated WBCs in the setting of being on antibiotics and found to have R side  large cavitary lesions ; on Ancef 12/3 and Levaquin  w/ plan for 6 week tx.       ROS:  Unable to ROS as pt is not responding .     Allergies    Kiwi (Unknown)  latex (Anaphylaxis)  latex (Unknown)  penicillin (Other)  penicillin (Unknown)  perfume  hives (Other)  potassium acetate (Other)  soap additives hives (Other)    Intolerances        ANTIBIOTICS/RELEVANT:  antimicrobials  ceFAZolin   IVPB 2000 milliGRAM(s) IV Intermittent every 8 hours  levoFLOXacin IVPB 500 milliGRAM(s) IV Intermittent every 24 hours    immunologic:    OTHER:  acetaminophen    Suspension .. 650 milliGRAM(s) Oral every 6 hours PRN  ALBUTerol    90 MICROgram(s) HFA Inhaler 4 Puff(s) Inhalation every 6 hours  BACItracin   Ointment 1 Application(s) Topical two times a day  buMETAnide Infusion 2 mG/Hr IV Continuous <Continuous>  buMETAnide Injectable 4 milliGRAM(s) IV Push once  chlorhexidine 0.12% Liquid 15 milliLiter(s) Oral Mucosa every 12 hours  chlorhexidine 4% Liquid 1 Application(s) Topical <User Schedule>  chlorhexidine 4% Liquid 1 Application(s) Topical <User Schedule>  dexMEDEtomidine Infusion 0.2 MICROgram(s)/kG/Hr IV Continuous <Continuous>  dextrose 40% Gel 15 Gram(s) Oral once  dextrose 5%. 1000 milliLiter(s) IV Continuous <Continuous>  dextrose 5%. 1000 milliLiter(s) IV Continuous <Continuous>  dextrose 50% Injectable 25 Gram(s) IV Push once  dextrose 50% Injectable 12.5 Gram(s) IV Push once  dextrose 50% Injectable 25 Gram(s) IV Push once  glucagon  Injectable 1 milliGRAM(s) IntraMuscular once  insulin lispro (ADMELOG) corrective regimen sliding scale   SubCutaneous every 6 hours  insulin NPH human recombinant 10 Unit(s) SubCutaneous every 6 hours  ipratropium 17 MICROgram(s) HFA Inhaler 2 Puff(s) Inhalation every 6 hours  midodrine 10 milliGRAM(s) Oral every 8 hours  norepinephrine Infusion 0.05 MICROgram(s)/kG/Min IV Continuous <Continuous>  nystatin Powder 1 Application(s) Topical two times a day  pantoprazole  Injectable 40 milliGRAM(s) IV Push daily  petrolatum Ophthalmic Ointment 1 Application(s) Both EYES two times a day  polyethylene glycol 3350 17 Gram(s) Oral every 12 hours  sodium chloride 0.9% lock flush 10 milliLiter(s) IV Push every 1 hour PRN      Objective:  Vital Signs Last 24 Hrs  T(C): 35.4 (07 Dec 2020 10:00), Max: 38.6 (06 Dec 2020 20:00)  T(F): 95.7 (07 Dec 2020 10:00), Max: 101.5 (06 Dec 2020 20:00)  HR: 74 (07 Dec 2020 10:00) (74 - 132)  RR: 34 (07 Dec 2020 10:00) (30 - 41)  SpO2: 95% (07 Dec 2020 10:00) (93% - 100%)    PHYSICAL EXAM:  General - morbidly obese lady , intubated on vent , currently on Precedex @ 1 not following any commands , no in visible distress   HEENT -   CV -   Resp -   Abdomen -   Extremities -   Skin -       LABS:                        6.8    26.30 )-----------( 190      ( 07 Dec 2020 00:38 )             22.4     12-    132<L>  |  100  |  29<H>  ----------------------------<  160<H>  4.2   |  19<L>  |  1.96<H>    Ca    8.8      07 Dec 2020 00:38  Phos  3.8     12-  Mg     2.5     12-    TPro  5.9<L>  /  Alb  2.6<L>  /  TBili  0.9  /  DBili  x   /  AST  47<H>  /  ALT  5<L>  /  AlkPhos  77  12-07    PT/INR - ( 07 Dec 2020 00:38 )   PT: 15.9 sec;   INR: 1.34 ratio         PTT - ( 07 Dec 2020 00:38 )  PTT:77.9 sec  Urinalysis Basic - ( 06 Dec 2020 22:31 )    Color: Dark Brown / Appearance: Turbid / S.032 / pH: x  Gluc: x / Ketone: SEE NOTE  / Bili: SEE NOTE / Urobili: SEE NOTE   Blood: x / Protein: SEE NOTE / Nitrite: SEE NOTE   Leuk Esterase: SEE NOTE / RBC: >50 /hpf / WBC 3-5   Sq Epi: x / Non Sq Epi: Occasional / Bacteria: Few          MICROBIOLOGY:        RADIOLOGY & ADDITIONAL STUDIES: Patient is a 60y old  Female who presents with a chief complaint of Acute hypoxemic respiratory failure 2/2 covid19 (06 Dec 2020 07:39)      INTERVAL HPI/OVERNIGHT EVENTS:  hgb 6.8 s/p 1 PRBC . heparin gtt on hold. s/p Bumex IVP without effect .     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 Reports that her  and son live with her and they have tested so far negative for covid and are asymptomatic, patient is not sure where was exposed. Reports trying her symbicort this week for shortness of breath without much relief. Patient yesterday began to have fevers with highest fever at 104F which alarmed her and for that reason she came to the hospital for help  (2020 05:46). Pt found to be COVID + and completed remdesivir -. On  pt became hypoxic w/ increasign WOB and was intubated for airway protection and transferred to the COVID ICU. The patient was also proned that night for 18 hours. Hosp course was complicated by ATN which was unresponsive to bumex challenge. On  the pt was consented for CVVHDF. Her course was also complicated by MSSA/P. mirablis bacteremia potentially due to urine vs line-associated and MSSA in sputum due to PNA. Pt on antibiotics and antifungals for WBC in the 40s.  On  Pt proned 20 hours with improved in pO2. Now supine and FiO2 reduced to 60%. On  pt went to CTA C/A/P due to persistently elevated WBCs in the setting of being on antibiotics and found to have R side  large cavitary lesions ; on Ancef 12/3 and Levaquin  w/ plan for 6 week tx.       ROS:  Unable to ROS as pt is not responding .     Allergies    Kiwi (Unknown)  latex (Anaphylaxis)  latex (Unknown)  penicillin (Other)  penicillin (Unknown)  perfume  hives (Other)  potassium acetate (Other)  soap additives hives (Other)    Intolerances        ANTIBIOTICS/RELEVANT:  antimicrobials  ceFAZolin   IVPB 2000 milliGRAM(s) IV Intermittent every 8 hours  levoFLOXacin IVPB 500 milliGRAM(s) IV Intermittent every 24 hours    immunologic:    OTHER:  acetaminophen    Suspension .. 650 milliGRAM(s) Oral every 6 hours PRN  ALBUTerol    90 MICROgram(s) HFA Inhaler 4 Puff(s) Inhalation every 6 hours  BACItracin   Ointment 1 Application(s) Topical two times a day  buMETAnide Infusion 2 mG/Hr IV Continuous <Continuous>  buMETAnide Injectable 4 milliGRAM(s) IV Push once  chlorhexidine 0.12% Liquid 15 milliLiter(s) Oral Mucosa every 12 hours  chlorhexidine 4% Liquid 1 Application(s) Topical <User Schedule>  chlorhexidine 4% Liquid 1 Application(s) Topical <User Schedule>  dexMEDEtomidine Infusion 0.2 MICROgram(s)/kG/Hr IV Continuous <Continuous>  dextrose 40% Gel 15 Gram(s) Oral once  dextrose 5%. 1000 milliLiter(s) IV Continuous <Continuous>  dextrose 5%. 1000 milliLiter(s) IV Continuous <Continuous>  dextrose 50% Injectable 25 Gram(s) IV Push once  dextrose 50% Injectable 12.5 Gram(s) IV Push once  dextrose 50% Injectable 25 Gram(s) IV Push once  glucagon  Injectable 1 milliGRAM(s) IntraMuscular once  insulin lispro (ADMELOG) corrective regimen sliding scale   SubCutaneous every 6 hours  insulin NPH human recombinant 10 Unit(s) SubCutaneous every 6 hours  ipratropium 17 MICROgram(s) HFA Inhaler 2 Puff(s) Inhalation every 6 hours  midodrine 10 milliGRAM(s) Oral every 8 hours  norepinephrine Infusion 0.05 MICROgram(s)/kG/Min IV Continuous <Continuous>  nystatin Powder 1 Application(s) Topical two times a day  pantoprazole  Injectable 40 milliGRAM(s) IV Push daily  petrolatum Ophthalmic Ointment 1 Application(s) Both EYES two times a day  polyethylene glycol 3350 17 Gram(s) Oral every 12 hours  sodium chloride 0.9% lock flush 10 milliLiter(s) IV Push every 1 hour PRN      Objective:  Vital Signs Last 24 Hrs  T(C): 35.4 (07 Dec 2020 10:00), Max: 38.6 (06 Dec 2020 20:00)  T(F): 95.7 (07 Dec 2020 10:00), Max: 101.5 (06 Dec 2020 20:00)  HR: 74 (07 Dec 2020 10:00) (74 - 132)  RR: 34 (07 Dec 2020 10:00) (30 - 41)  SpO2: 95% (07 Dec 2020 10:00) (93% - 100%)    PHYSICAL EXAM:  General - morbidly obese lady , intubated on vent , currently on Precedex @ 1 not following any commands , no in visible distress   HEENT -   CV -   Resp -   Abdomen -   Extremities -   Skin - chin with large wound       LABS:                        6.8    26.30 )-----------( 190      ( 07 Dec 2020 00:38 )             22.4     12-    132<L>  |  100  |  29<H>  ----------------------------<  160<H>  4.2   |  19<L>  |  1.96<H>    Ca    8.8      07 Dec 2020 00:38  Phos  3.8     12-  Mg     2.5     12-    TPro  5.9<L>  /  Alb  2.6<L>  /  TBili  0.9  /  DBili  x   /  AST  47<H>  /  ALT  5<L>  /  AlkPhos  77  12-07    PT/INR - ( 07 Dec 2020 00:38 )   PT: 15.9 sec;   INR: 1.34 ratio         PTT - ( 07 Dec 2020 00:38 )  PTT:77.9 sec  Urinalysis Basic - ( 06 Dec 2020 22:31 )    Color: Dark Brown / Appearance: Turbid / S.032 / pH: x  Gluc: x / Ketone: SEE NOTE  / Bili: SEE NOTE / Urobili: SEE NOTE   Blood: x / Protein: SEE NOTE / Nitrite: SEE NOTE   Leuk Esterase: SEE NOTE / RBC: >50 /hpf / WBC 3-5   Sq Epi: x / Non Sq Epi: Occasional / Bacteria: Few          MICROBIOLOGY:        RADIOLOGY & ADDITIONAL STUDIES: Patient is a 60y old  Female who presents with a chief complaint of Acute hypoxemic respiratory failure 2/2 covid19 (06 Dec 2020 07:39)      INTERVAL HPI/OVERNIGHT EVENTS:  hgb 6.8 s/p 1 PRBC . heparin gtt on hold. s/p Bumex IVP without effect .     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 Reports that her  and son live with her and they have tested so far negative for covid and are asymptomatic, patient is not sure where was exposed. Reports trying her symbicort this week for shortness of breath without much relief. Patient yesterday began to have fevers with highest fever at 104F which alarmed her and for that reason she came to the hospital for help  (2020 05:46). Pt found to be COVID + and completed remdesivir -. On  pt became hypoxic w/ increasign WOB and was intubated for airway protection and transferred to the COVID ICU. The patient was also proned that night for 18 hours. Hosp course was complicated by ATN which was unresponsive to bumex challenge. On  the pt was consented for CVVHDF. Her course was also complicated by MSSA/P. mirablis bacteremia potentially due to urine vs line-associated and MSSA in sputum due to PNA. Pt on antibiotics and antifungals for WBC in the 40s.  On  Pt proned 20 hours with improved in pO2. Now supine and FiO2 reduced to 60%. On  pt went to CT[facility] C/A/P due to persistently elevated WBCs in the setting of being on antibiotics and found to have R side  large cavitary lesions ; on Ancef 12/3 and Levaquin  w/ plan for 6 week tx.       ROS:  Unable to ROS as pt is not responding .     Allergies    Kiwi (Unknown)  latex (Anaphylaxis)  latex (Unknown)  penicillin (Other)  penicillin (Unknown)  perfume  hives (Other)  potassium acetate (Other)  soap additives hives (Other)    Intolerances        ANTIBIOTICS/RELEVANT:  antimicrobials  ceFAZolin   IVPB 2000 milliGRAM(s) IV Intermittent every 8 hours  levoFLOXacin IVPB 500 milliGRAM(s) IV Intermittent every 24 hours    immunologic:    OTHER:  acetaminophen    Suspension .. 650 milliGRAM(s) Oral every 6 hours PRN  ALBUTerol    90 MICROgram(s) HFA Inhaler 4 Puff(s) Inhalation every 6 hours  BACItracin   Ointment 1 Application(s) Topical two times a day  buMETAnide Infusion 2 mG/Hr IV Continuous <Continuous>  buMETAnide Injectable 4 milliGRAM(s) IV Push once  chlorhexidine 0.12% Liquid 15 milliLiter(s) Oral Mucosa every 12 hours  chlorhexidine 4% Liquid 1 Application(s) Topical <User Schedule>  chlorhexidine 4% Liquid 1 Application(s) Topical <User Schedule>  dexMEDEtomidine Infusion 0.2 MICROgram(s)/kG/Hr IV Continuous <Continuous>  dextrose 40% Gel 15 Gram(s) Oral once  dextrose 5%. 1000 milliLiter(s) IV Continuous <Continuous>  dextrose 5%. 1000 milliLiter(s) IV Continuous <Continuous>  dextrose 50% Injectable 25 Gram(s) IV Push once  dextrose 50% Injectable 12.5 Gram(s) IV Push once  dextrose 50% Injectable 25 Gram(s) IV Push once  glucagon  Injectable 1 milliGRAM(s) IntraMuscular once  insulin lispro (ADMELOG) corrective regimen sliding scale   SubCutaneous every 6 hours  insulin NPH human recombinant 10 Unit(s) SubCutaneous every 6 hours  ipratropium 17 MICROgram(s) HFA Inhaler 2 Puff(s) Inhalation every 6 hours  midodrine 10 milliGRAM(s) Oral every 8 hours  norepinephrine Infusion 0.05 MICROgram(s)/kG/Min IV Continuous <Continuous>  nystatin Powder 1 Application(s) Topical two times a day  pantoprazole  Injectable 40 milliGRAM(s) IV Push daily  petrolatum Ophthalmic Ointment 1 Application(s) Both EYES two times a day  polyethylene glycol 3350 17 Gram(s) Oral every 12 hours  sodium chloride 0.9% lock flush 10 milliLiter(s) IV Push every 1 hour PRN      Objective:  Vital Signs Last 24 Hrs  T(C): 35.4 (07 Dec 2020 10:00), Max: 38.6 (06 Dec 2020 20:00)  T(F): 95.7 (07 Dec 2020 10:00), Max: 101.5 (06 Dec 2020 20:00)  HR: 74 (07 Dec 2020 10:00) (74 - 132)  RR: 34 (07 Dec 2020 10:00) (30 - 41)  SpO2: 95% (07 Dec 2020 10:00) (93% - 100%)    PHYSICAL EXAM:  General - morbidly obese lady , intubated on vent , currently on Precedex @ 1 not following any commands , no in visible distress   HEENT - ET and OGT secured at the lip line. Chin with large wound   CV - s1 & s2 + ,   Resp - good air entry , diminished breath sounds at the bases , remains intubated   Abdomen - obese , soft , BS x 4 quadrants   Extremities - no edema , good pulses and capillary refills < 3 sec   Skin - chin with large wound , otherwise warm and dry skin       LABS:                        6.8    26.30 )-----------( 190      ( 07 Dec 2020 00:38 )             22.4     12-    132<L>  |  100  |  29<H>  ----------------------------<  160<H>  4.2   |  19<L>  |  1.96<H>    Ca    8.8      07 Dec 2020 00:38  Phos  3.8     12-  Mg     2.5     12-    TPro  5.9<L>  /  Alb  2.6<L>  /  TBili  0.9  /  DBili  x   /  AST  47<H>  /  ALT  5<L>  /  AlkPhos  77  12-    PT/INR - ( 07 Dec 2020 00:38 )   PT: 15.9 sec;   INR: 1.34 ratio         PTT - ( 07 Dec 2020 00:38 )  PTT:77.9 sec  Urinalysis Basic - ( 06 Dec 2020 22:31 )    Color: Dark Brown / Appearance: Turbid / S.032 / pH: x  Gluc: x / Ketone: SEE NOTE  / Bili: SEE NOTE / Urobili: SEE NOTE   Blood: x / Protein: SEE NOTE / Nitrite: SEE NOTE   Leuk Esterase: SEE NOTE / RBC: >50 /hpf / WBC 3-5   Sq Epi: x / Non Sq Epi: Occasional / Bacteria: Few          MICROBIOLOGY:  Culture - Sputum . (20 @ 08:55)    -  Levofloxacin: S <=0.5    -  Trimethoprim/Sulfamethoxazole: S <=0.5/9.5    Gram Stain:   Moderate Squamous epithelial cells per low power field  No polymorphonuclear leukocytes per low power field  Few Gram Variable Rods per oil power field  Rare Gram positive cocci in pairs per oil power field    -  Ceftazidime: S 4    Specimen Source: .Sputum Sputum    Culture Results:   Moderate Stenotrophomonas maltophilia  Normal Respiratory Jocelynn present    Organism Identification: Stenotrophomonas maltophilia    Organism: Stenotrophomonas maltophilia    Method Type: LONG        Culture - Blood (20 @ 04:32)    Specimen Source: .Blood Blood-Peripheral    Culture Results:   No Growth Final      RADIOLOGY & ADDITIONAL STUDIES:    < from: Xray Chest 1 View- PORTABLE-Urgent (Xray Chest 1 View- PORTABLE-Urgent .) (20 @ 20:17) >    EXAM:  XR CHEST PORTABLE URGENT 1V                            PROCEDURE DATE:  2020            INTERPRETATION:  A single chest x-ray was obtained on 2020.    Indication: OG placement.    Impression:    The heart is enlarged. Diffuse airspace opacities are seen throughout post lungs accentuated in the right midlung field and right upper lobe. Correlate with CT scan that was performed the same date. Changes compatible with COVID pneumonia. Endotracheal tube is in good position. OG tube is in the stomach. A central line is seen on the left and the tip is in the superior vena cava. The right central line was removed.    < end of copied text >

## 2020-12-07 NOTE — PROGRESS NOTE ADULT - ASSESSMENT
60 year old F w/ pmh of HTN, DVT on Xarelto, AARON who presented w/ fevers found to have COVI w/ superimposed PNA and bacteremia complicated by hypoxic respiratory failure s/p intubation, septic shock and ATN requiring CRRT .    Neuro  -no mental status ; on Precedex gtt @ 1   -will turn off Precedex ; off Precedex not responding ; her 's and tachypneic to high 30's   -will turn Precedex gtt  back on however on lover dose   -c/w neuro checks     Resp  Acute hypoxic resp failure 2/2 covid PNA, and found to have R side cavitary lesions on Ancef and Levaquin    - 30/350/40% P 10 ; able to titrate P to 8 . will get ABG   -was proned 12/4  -aspiration precaution   -CXR      CV  -required transiently levophed gtt today ;  now off , will increase Midodrine to 20 TID   -will give Bumex 4 mg IVP followed by gtt   - check electrolytes   -repeat CBC ; pt is s/p 1 PRBC ; hgb 6.8 to 7.4 ; noted blood in negro cath and some cloths   -hx of  DVt on Xarelto was transitioned to heparin gtt now on hold due to anemia ; will repeat [facility] duplex   - CT negative for PE    GI  - NPO on TF   - Continue bowel regimen   -c/w PPI       -ARTEMIO s/p CRRT last tx 12/6   -worsening BUN / Cr   -R IJDB L   -pt was found to have blood cloth obstructing negro cath ; irrigation q 4 hrs   -will Bumex challenge ; strict i&o's   -daily weight   -nephrology input appreciated       ID  Covid 19  - Completed remdesivir, dexamethasone discontinued in setting of bacteremia  - + Sputum MSSA , + bacteremia P mirablis . CTA chest  revealing R side  cavitary consolidation.  - BC from 12/1 NGTD, Sputum cx Strenotrophemonas sensitive to Levofloxacin  - Continue cefazolin 12/3/20 and Levofloxacin 12/2  .Will need > 6 weeks of antibiotics.  - caspofungin discontinued, no fungal growth.   - WBC downtrended from 26 to 18        60 year old F w/ pmh of HTN, DVT on Xarelto, AARON who presented w/ fevers found to have COVI w/ superimposed PNA and bacteremia complicated by hypoxic respiratory failure s/p intubation, septic shock and ATN requiring CRRT .    Neuro  -no mental status ; on Precedex gtt @ 1   -will turn off Precedex ; off Precedex not responding ; her 's and tachypneic to high 30's   -will turn Precedex gtt  back on however on lover dose   -c/w neuro checks     Resp  Acute hypoxic resp failure 2/2 covid PNA, and found to have R side cavitary lesions on Ancef and Levaquin    - 30/350/40% P 10 ; able to titrate P to 8 . will get ABG   -was proned 12/4  -aspiration precaution   -CXR      CV  -required transiently levophed gtt today ;  now off , will increase Midodrine to 20 TID   -will give Bumex 4 mg IVP followed by gtt   - check electrolytes   -repeat CBC ; pt is s/p 1 PRBC ; hgb 6.8 to 7.4 ; noted blood in negro cath and some cloths   -hx of  DVt on Xarelto was transitioned to heparin gtt now on hold due to anemia ; will repeat VA duplex   - CT negative for PE    GI  - NPO on TF   - Continue bowel regimen   -c/w PPI       -ARTEMIO s/p CRRT last tx 12/6   -worsening BUN / Cr   -R IJDB L   -pt was found to have blood cloth obstructing negro cath ; irrigation q 4 hrs   -will Bumex challenge ; strict i&o's   -daily weight   -nephrology input appreciated       ID  Covid 19  - Completed remdesivir, dexamethasone discontinued in setting of bacteremia  - + Sputum MSSA , + bacteremia P mirablis . CTA chest  revealing R side  cavitary  abscess due to staph aureus. Continue Cefazolin.  - BC from 12/1 NGTD, Sputum cx Strenotrophemonas sensitive to Levofloxacin  - Continue cefazolin 12/3/20 and Levofloxacin 12/2  .Will need > 6 weeks of antibiotics.  - caspofungin discontinued, no fungal growth.   - WBC downtrended from 26 to 18     GOC  Full code . family updated on current status.        60 year old F w/ pmh of HTN, DVT on Xarelto, AARON who presented w/ fevers found to have COVI w/ superimposed PNA and bacteremia complicated by hypoxic respiratory failure s/p intubation, septic shock and ATN requiring CRRT .    Neuro  -no mental status ; on Precedex gtt @ 1   -will turn off Precedex ; off Precedex not responding ; her 's and tachypneic to high 30's   -will turn Precedex gtt  back on however on lover dose   -c/w neuro checks ; if not improvement would get CT H   -ammonia level     Resp  Acute hypoxic resp failure 2/2 covid PNA, and found to have R side cavitary lesions on Ancef and Levaquin    - 30/350/40% P 10 ; able to titrate P to 8 . will get ABG   -was proned 12/4  -aspiration precaution   -CXR      CV  -required transiently levophed gtt today ;  now off , will increase Midodrine to 20 TID   -will give Bumex 4 mg IVP followed by gtt   - check electrolytes   -repeat CBC ; pt is s/p 1 PRBC ; hgb 6.8 to 7.4 ; noted blood in negro cath and some cloths   -hx of  DVt on Xarelto was transitioned to heparin gtt now on hold due to anemia ; will repeat VA duplex   - CT negative for PE    GI  - NPO on TF   - Continue bowel regimen   -c/w PPI       -ARTEMIO s/p CRRT last tx 12/6   -worsening BUN / Cr   -R IJDB L   -pt was found to have blood cloth obstructing negro cath ; irrigation q 4 hrs   -will Bumex challenge ; strict i&o's   -daily weight   -nephrology input appreciated       ID  Covid 19  - Completed remdesivir, dexamethasone discontinued in setting of bacteremia  - + Sputum MSSA , + bacteremia P mirablis . CTA chest  revealing R side  cavitary  abscess due to staph aureus. Continue Cefazolin.  - BC from 12/1 NGTD, Sputum cx Strenotrophemonas sensitive to Levofloxacin  - Continue cefazolin 12/3/20 and Levofloxacin 12/2  .Will need > 6 weeks of antibiotics.  - caspofungin discontinued, no fungal growth.   - WBC downtrended from 26 to 18     GOC  Full code . family updated on current status.

## 2020-12-07 NOTE — PROCEDURE NOTE - NSPROCDETAILS_GEN_ALL_CORE
dressing applied/secured in place/location identified, draped/prepped, sterile technique used/blood seen on insertion/flushes easily/ultrasound utilization/sterile technique, catheter placed
guidewire recovered/lumen(s) aspirated and flushed/sterile dressing applied/sterile technique, catheter placed/ultrasound guidance
lumen(s) aspirated and flushed/guidewire recovered/sterile dressing applied/sterile technique, catheter placed/ultrasound guidance
ultrasound guidance/location identified, draped/prepped, sterile technique used, needle inserted/introduced/positive blood return obtained via catheter/connected to a pressurized flush line/all materials/supplies accounted for at end of procedure/sutured in place/hemostasis with direct pressure, dressing applied/Seldinger technique
positive blood return obtained via catheter/connected to a pressurized flush line/sutured in place/hemostasis with direct pressure, dressing applied/Seldinger technique/all materials/supplies accounted for at end of procedure/ultrasound guidance
guidewire recovered/sterile technique, catheter placed/lumen(s) aspirated and flushed/sterile dressing applied/ultrasound guidance

## 2020-12-07 NOTE — PROGRESS NOTE ADULT - ATTENDING COMMENTS
1. Acute hypoxemic respiratory with ARDS from SARS 2, Covid -19  pneumonia. Pt also with morbid obesity with BMI~58. Tolerating proning with good results. Currently on AC 8 peep 40%.  2..Id. S. Aureus ,S. Hominus, and Proteus bacteremia. Continue Cefazolin. WBC  slowly decreasing. . Continue Levaquin for Stenotrophomonas.  Ct chest shows pulmonary abscess due to staph aureus. Continue Cefazolin.  3. Renal failure from ATN.Trial of CVVHD. Bumex drip     4. Hypotension from septic shock. Still dependent on norepinephrine. 1. Acute hypoxemic respiratory with ARDS from SARS 2, Covid -19  pneumonia. Pt also with morbid obesity with BMI~58. Tolerating proning with good results. Currently on AC 8 peep 40%.  2..Id. S. Aureus ,S. Hominus, and Proteus bacteremia. Continue Cefazolin. WBC  slowly decreasing. . Continue Levaquin for Stenotrophomonas.  Ct chest shows pulmonary abscess due to staph aureus. Continue Cefazolin.  3. Renal failure from ATN. Trial off CVVHD. Bumex drip     4. Hypotension from septic shock. Still dependent on norepinephrine.

## 2020-12-07 NOTE — PROGRESS NOTE ADULT - ASSESSMENT
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 - used symbicort intermittently), AARON, peritonitis s/p Oral's procedure and ileostomy (reversed 2011) admitted with COVID19    Developed MSSA Bacteremia  Sputum culture with MSSA (likely PNA as initial source)  BCx also with Proteus (Cefazolin susceptible) - ?Urinary Source  Will need line replacement as patient was actively bacteremia at time of currently line placement  Patient also with Stenotrophomonas in sputum on 12/1    CT C/A/P (12/4) with New large consolidation with areas of cavitation in the right lung  Admission CTA Chest without cavitation so I suspect this finding is secondary to MSSA Pneumonia  Reassuringly no abscess in CT Chest or CT A/P  Would continue Cefazolin at this time    RECOMMENDATIONS:    #Positive Sputum Culture (Stenotrophomonas)  --Decrease Levofloxacin to 500 mg IV Q48H if not restarting CRRT (End Date: 12/8/20)    #MSSA bacteremia, Leukocytosis (worsening)  --Decrease Cefazolin to 2g IV Q24H if not restarting CRRT (2g IV Q12H if restarting CRRT)  --Recommend line change (especially if clinical instability or persistence in bacteremia)  --Continue to follow CBC with diff  --Continue to follow renal function (Cr/BUN)  --Continue to follow temperature curve  --Follow up on preliminary blood cultures (repeat Q48H until cleared)    #P. mirablis bacteremia  --Decrease Cefazolin to 2g IV Q24H if not restarting CRRT (2g IV Q12H if restarting CRRT)    #COVID-19, Hypoxic Respiratory Failure (persistent)  - please hold further Dexamethasone (in setting of active bacteremia)  - Continue supplemental O2 and supportive care per primary team  - Continue Contact and Airborne Isolation precautions    #Antibiotic allergy  - uncertain reaction - tolerated Ceftriaxone, Cefepime and Cefazolin in this admission  - outpatient  follow-up with Allergy/Immunology    I will continue to follow. Please feel free to contact me with any further questions.    Femi Adkins M.D.  [facility] Division of Infectious Disease  8AM-5PM: Pager Number [phone]  After Hours (or if no response): Please contact the Infectious Diseases Office at [phone]     The above assessment and plan were discussed with 5ICU NP 60/F with PMH HTN, DVT/PE in past, ?Asthma (likely 2/2 PE? - used symbicort intermittently), AARON, peritonitis s/p Oral's procedure and ileostomy (reversed 2011) admitted with COVID19    Developed MSSA Bacteremia  Sputum culture with MSSA (likely PNA as initial source)  BCx also with Proteus (Cefazolin susceptible) - ?Urinary Source  Will need line replacement as patient was actively bacteremia at time of currently line placement  Patient also with Stenotrophomonas in sputum on 12/1    CT C/A/P (12/4) with New large consolidation with areas of cavitation in the right lung  Admission CTA Chest without cavitation so I suspect this finding is secondary to MSSA Pneumonia  Reassuringly no abscess in CT Chest or CT A/P  Would continue Cefazolin at this time    RECOMMENDATIONS:    #Positive Sputum Culture (Stenotrophomonas)  --Decrease Levofloxacin to 500 mg IV Q48H if not restarting CRRT (End Date: 12/8/20)    #MSSA bacteremia, Leukocytosis (worsening), Cavitary Pneumonia   --Decrease Cefazolin to 2g IV Q24H if not restarting CRRT (2g IV Q12H if restarting CRRT)  --Recommend line change (especially if clinical instability or persistence in bacteremia)  --Continue to follow CBC with diff  --Continue to follow renal function (Cr/BUN)  --Continue to follow temperature curve  --Follow up on preliminary blood cultures (repeat Q48H until cleared)    #P. mirablis bacteremia  --Decrease Cefazolin to 2g IV Q24H if not restarting CRRT (2g IV Q12H if restarting CRRT)    #COVID-19, Hypoxic Respiratory Failure (persistent)  - please hold further Dexamethasone (in setting of active bacteremia)  - Continue supplemental O2 and supportive care per primary team  - Continue Contact and Airborne Isolation precautions    #Antibiotic allergy  - uncertain reaction - tolerated Ceftriaxone, Cefepime and Cefazolin in this admission  - outpatient  follow-up with Allergy/Immunology    I will continue to follow. Please feel free to contact me with any further questions.    Femi Adkins M.D.  [facility] Division of Infectious Disease  8AM-5PM: Pager Number [phone]  After Hours (or if no response): Please contact the Infectious Diseases Office at [phone]     The above assessment and plan were discussed with 5ICU NP

## 2020-12-07 NOTE — PROCEDURE NOTE - PROCEDURE DATE TIME, MLM
07-Dec-2020 18:00
23-Nov-2020 05:15
23-Nov-2020 05:25
27-Nov-2020 16:20
27-Nov-2020 22:00
28-Nov-2020 01:00

## 2020-12-07 NOTE — CHART NOTE - NSCHARTNOTESELECT_GEN_ALL_CORE
Malnutrition Notification
Event Note
Event Note/Medicine NP
Event Note/Prone
Event Note/fever
Medicine NP/Event Note
Nutrition Services
RRT/Event Note
Transfer Note/Acceptance Note

## 2020-12-07 NOTE — CHART NOTE - NSCHARTNOTEFT_GEN_A_CORE
Nutrition Follow Up Note   Patient seen for: nutrition follow up on COVID ICU     Source: medical record, communication with team. Unable to speak to pt due to current airborne isolation contact precautions related to COVID-19. Pt remains intubated.     Chart reviewed, events noted. Per chart: 59 y/o AARON, htn, DVT, PE, asthma? admitted to [facility] for cough, SOB, diarrhea found to have covid19, hospital course c/b Acute Hypoxic resp failure d/t to covid19 pna, intubated on 11/22/2020 and transfer to MICU. Patient with worsening covid PNA, new Staph PNA, (sputum with Staph), bacteremia, worsening PF ratio, failed proning 11/27 night. Renal fx worsened, urine output low, with no significant improvement with diuresis. On 11/28 patient was started on CVVHD.     Diet Order: Diet, NPO with Tube Feed:   Tube Feeding Modality: Orogastric  Nepro with Carb Steady (NEPRORTH)  Total Volume for 24 Hours (mL): 600  Continuous  Starting Tube Feed Rate {mL per Hour}: 15  Increase Tube Feed Rate by (mL): 10     Every 6 hours  Until Goal Tube Feed Rate (mL per Hour): 25  Tube Feed Duration (in Hours): 24  Tube Feed Start Time: 12:00  No Carb Prosource TF    Qty per Day:  4 (12-03-20 @ 12:16)    Current order provides: 1222 kcal and 93g protein (with 4 Prosource TF)    Previous diet orders:   11/27: Nepro @ 15 ml/hr x 24 hours + Prosource 4x/day  11/30: Nepro @ 20 ml/hr x 24 hours + Prosource 4x/day   11/30: Nepro @ 10 ml/hr x 24 hours + Prosource 4x/day   12/3: Nepro @ 25ml/hr x 24 hours + Prosource TF 4x/day - current     Nutrition Events:   - Pt proned for 20 hours on 12/4 with improved oxygenation. Nimbex discontinued due to improving oxygenation and now off Versed  - Pt keeping net even with CRRT  - Most recent TG level 326 mg/dL (11-30); trending up, propofol d/c'd 11/30  - Hx of DM, A1c 7.5%, glucose control with NPH 10units q6H, SSI  - EN provision:       (12/07) - currently running at goal rate      (12/06)- 71% of goal volume achieved, feeds H for clogged OGT      (12/05)- 100% of goal volume      (12/04) - 38% of goal volume      (12/03)-  33% of goal volume, feeds held while pt prone      (12/02)- ran at goal of 10mL/hr    GI per chart:   - Last BM: 12/3  - Bowel regimen: Miralax  - Reglan discontinued    Anthropometric Measurements:   Height (cm): 152.4 (11-17-20 @ 20:39)  Weight (kg): 136.1 (11-17-20 @ 20:39)  BMI (kg/m2): 58.6 (11-17-20 @ 20:39)    124.4 kg (11/30), 119 kg (11/23)- will continue to monitor weights.     Medications: MEDICATIONS  (STANDING):  ALBUTerol    90 MICROgram(s) HFA Inhaler 4 Puff(s) Inhalation every 6 hours  BACItracin   Ointment 1 Application(s) Topical two times a day  ceFAZolin   IVPB 2000 milliGRAM(s) IV Intermittent every 8 hours  chlorhexidine 0.12% Liquid 15 milliLiter(s) Oral Mucosa every 12 hours  chlorhexidine 4% Liquid 1 Application(s) Topical <User Schedule>  chlorhexidine 4% Liquid 1 Application(s) Topical <User Schedule>  dexMEDEtomidine Infusion 0.2 MICROgram(s)/kG/Hr (6.81 mL/Hr) IV Continuous <Continuous>  dextrose 40% Gel 15 Gram(s) Oral once  dextrose 5%. 1000 milliLiter(s) (50 mL/Hr) IV Continuous <Continuous>  dextrose 5%. 1000 milliLiter(s) (100 mL/Hr) IV Continuous <Continuous>  dextrose 50% Injectable 25 Gram(s) IV Push once  dextrose 50% Injectable 12.5 Gram(s) IV Push once  dextrose 50% Injectable 25 Gram(s) IV Push once  glucagon  Injectable 1 milliGRAM(s) IntraMuscular once  insulin lispro (ADMELOG) corrective regimen sliding scale   SubCutaneous every 6 hours  insulin NPH human recombinant 10 Unit(s) SubCutaneous every 6 hours  ipratropium 17 MICROgram(s) HFA Inhaler 2 Puff(s) Inhalation every 6 hours  levoFLOXacin IVPB 500 milliGRAM(s) IV Intermittent every 24 hours  midodrine 10 milliGRAM(s) Oral every 8 hours  norepinephrine Infusion 0.05 MICROgram(s)/kG/Min (12.8 mL/Hr) IV Continuous <Continuous>  nystatin Powder 1 Application(s) Topical two times a day  pantoprazole  Injectable 40 milliGRAM(s) IV Push daily  petrolatum Ophthalmic Ointment 1 Application(s) Both EYES two times a day  polyethylene glycol 3350 17 Gram(s) Oral every 12 hours    MEDICATIONS  (PRN):  acetaminophen    Suspension .. 650 milliGRAM(s) Oral every 6 hours PRN Temp greater or equal to 38C (100.4F)  sodium chloride 0.9% lock flush 10 milliLiter(s) IV Push every 1 hour PRN Pre/post blood products, medications, blood draw, and to maintain line patency    Labs: 12-07 @ 07:03: Sodium --, Potassium --, Calcium --, Magnesium --, Phosphorus --, BUN --, Creatinine --, Glucose --, Alk Phos --, ALT/SGPT --, AST/SGOT --, Albumin --, Prealbumin --, Total Bilirubin --, Hemoglobin --, Hematocrit --, Ferritin 1016<H>, C-Reactive Protein --, Creatine Kinase <<27>  12-07 @ 00:38: Sodium 132<L>, Potassium 4.2, Calcium 8.8, Magnesium 2.5, Phosphorus 3.8, BUN 29<H>, Creatinine 1.96<H>, Glucose 160<H>, Alk Phos 77, ALT/SGPT 5<L>, AST/SGOT 47<H>, Albumin 2.6<L>, Prealbumin --, Total Bilirubin 0.9, Hemoglobin 6.8<LL>, Hematocrit 22.4<L>, Ferritin --, C-Reactive Protein 10.71<H>, Creatine Kinase <<27>      Triglycerides, Serum: 326 mg/dL (11-30-20 @ 05:11)  Triglycerides, Serum: 289 mg/dL (11-29-20 @ 11:35)  Triglycerides, Serum: 166 mg/dL (11-25-20 @ 00:46)    POCT Blood Glucose.: 160 mg/dL (12-07-20 @ 04:57)  POCT Blood Glucose.: 164 mg/dL (12-06-20 @ 23:06)  POCT Blood Glucose.: 149 mg/dL (12-06-20 @ 16:31)  POCT Blood Glucose.: 118 mg/dL (12-06-20 @ 10:55)    Skin: no pressure injuries   Edema: 3+ generalized    Estimated Needs:   Using IBW 45.45kg in setting of BMI >50  Energy (22-25 kcal/kg): 9926-9851 kcal  Protein: (2.0-2.5 g/kg): 91-114g protein    (12/1): Torsten State Equation (REE): The [place] State Equation (PSU) 2003b was used to calculate resting energy expenditure. RMR = 2226 kcal/day     Previous Nutrition Diagnoses:   1) Overweight/obesity - ongoing, addressed with hypocaloric, high protein tube feeds  2) Food and nutrition related knowledge deficit - intervention deferred at this time  3) Inadequate protein-energy intake - addressed and resolved with EN at goal rate  4) Increased nutrient needs - addressed with EN at goal rate    New Nutrition Diagnosis: none at this time    Recommended Interventions:   1.   2.   3.   4.     Monitoring and Evaluation:   Continue to monitor nutrition provision and tolerance, weights, labs, skin integrity.   RD remains available upon request and will follow up per protocol.    Selina Mclean RD, CDN Pager: [phone] Nutrition Follow Up Note   Patient seen for: nutrition follow up on COVID ICU     Source: medical record, communication with team. Unable to speak to pt due to current airborne isolation contact precautions related to COVID-19. Pt remains intubated.     Chart reviewed, events noted. Per chart: 61 y/o AARON, htn, DVT, PE, asthma? admitted to [facility] for cough, SOB, diarrhea found to have covid19, hospital course c/b Acute Hypoxic resp failure d/t to covid19 pna, intubated on 11/22/2020 and transfer to MICU. Patient with worsening covid PNA, new Staph PNA, (sputum with Staph), bacteremia, worsening PF ratio, failed proning 11/27 night. Renal fx worsened, urine output low, with no significant improvement with diuresis. On 11/28 patient was started on CVVHD.     Diet Order: Diet, NPO with Tube Feed:   Tube Feeding Modality: Orogastric  Nepro with Carb Steady (NEPRORTH)  Total Volume for 24 Hours (mL): 600  Continuous  Starting Tube Feed Rate {mL per Hour}: 15  Increase Tube Feed Rate by (mL): 10     Every 6 hours  Until Goal Tube Feed Rate (mL per Hour): 25  Tube Feed Duration (in Hours): 24  Tube Feed Start Time: 12:00  No Carb Prosource TF    Qty per Day:  4 (12-03-20 @ 12:16)    Current order provides: 1222 kcal and 93g protein (with 4 Prosource TF)    Previous diet orders:   11/27: Nepro @ 15 ml/hr x 24 hours + Prosource 4x/day  11/30: Nepro @ 20 ml/hr x 24 hours + Prosource 4x/day   11/30: Nepro @ 10 ml/hr x 24 hours + Prosource 4x/day   12/3: Nepro @ 25ml/hr x 24 hours + Prosource TF 4x/day - current     Nutrition Events:   - Pt proned for 20 hours on 12/4 with improved oxygenation. Nimbex discontinued due to improving oxygenation and now off Versed  - Pt keeping net even with CRRT  - Noted Phosphorus low (12/6 and 12/5), WNL today.   - Creatinine rising 1.96 today  - Most recent TG level 326 mg/dL (11-30); trending up, propofol d/c'd 11/30  - Hx of DM, A1c 7.5%, glucose control with NPH 10units q6H, SSI  - EN provision:       (12/07) - currently running at goal rate      (12/06)- 71% of goal volume achieved, feeds H for clogged OGT      (12/05)- 100% of goal volume      (12/04) - 38% of goal volume      (12/03)-  33% of goal volume, feeds held while pt prone      (12/02)- ran at goal of 10mL/hr    GI per chart:   - Last BM: 12/3  - Bowel regimen: Miralax  - Reglan discontinued    Anthropometric Measurements:   Height (cm): 152.4 (11-17-20 @ 20:39)  Weight (kg): 136.1 (11-17-20 @ 20:39)  BMI (kg/m2): 58.6 (11-17-20 @ 20:39)    124.4 kg (11/30), 119 kg (11/23)- will continue to monitor weights.     Medications: MEDICATIONS  (STANDING):  ALBUTerol    90 MICROgram(s) HFA Inhaler 4 Puff(s) Inhalation every 6 hours  BACItracin   Ointment 1 Application(s) Topical two times a day  ceFAZolin   IVPB 2000 milliGRAM(s) IV Intermittent every 8 hours  chlorhexidine 0.12% Liquid 15 milliLiter(s) Oral Mucosa every 12 hours  chlorhexidine 4% Liquid 1 Application(s) Topical <User Schedule>  chlorhexidine 4% Liquid 1 Application(s) Topical <User Schedule>  dexMEDEtomidine Infusion 0.2 MICROgram(s)/kG/Hr (6.81 mL/Hr) IV Continuous <Continuous>  dextrose 40% Gel 15 Gram(s) Oral once  dextrose 5%. 1000 milliLiter(s) (50 mL/Hr) IV Continuous <Continuous>  dextrose 5%. 1000 milliLiter(s) (100 mL/Hr) IV Continuous <Continuous>  dextrose 50% Injectable 25 Gram(s) IV Push once  dextrose 50% Injectable 12.5 Gram(s) IV Push once  dextrose 50% Injectable 25 Gram(s) IV Push once  glucagon  Injectable 1 milliGRAM(s) IntraMuscular once  insulin lispro (ADMELOG) corrective regimen sliding scale   SubCutaneous every 6 hours  insulin NPH human recombinant 10 Unit(s) SubCutaneous every 6 hours  ipratropium 17 MICROgram(s) HFA Inhaler 2 Puff(s) Inhalation every 6 hours  levoFLOXacin IVPB 500 milliGRAM(s) IV Intermittent every 24 hours  midodrine 10 milliGRAM(s) Oral every 8 hours  norepinephrine Infusion 0.05 MICROgram(s)/kG/Min (12.8 mL/Hr) IV Continuous <Continuous>  nystatin Powder 1 Application(s) Topical two times a day  pantoprazole  Injectable 40 milliGRAM(s) IV Push daily  petrolatum Ophthalmic Ointment 1 Application(s) Both EYES two times a day  polyethylene glycol 3350 17 Gram(s) Oral every 12 hours    MEDICATIONS  (PRN):  acetaminophen    Suspension .. 650 milliGRAM(s) Oral every 6 hours PRN Temp greater or equal to 38C (100.4F)  sodium chloride 0.9% lock flush 10 milliLiter(s) IV Push every 1 hour PRN Pre/post blood products, medications, blood draw, and to maintain line patency    Labs: 12-07 @ 07:03: Sodium --, Potassium --, Calcium --, Magnesium --, Phosphorus --, BUN --, Creatinine --, Glucose --, Alk Phos --, ALT/SGPT --, AST/SGOT --, Albumin --, Prealbumin --, Total Bilirubin --, Hemoglobin --, Hematocrit --, Ferritin 1016<H>, C-Reactive Protein --, Creatine Kinase <<27>  12-07 @ 00:38: Sodium 132<L>, Potassium 4.2, Calcium 8.8, Magnesium 2.5, Phosphorus 3.8, BUN 29<H>, Creatinine 1.96<H>, Glucose 160<H>, Alk Phos 77, ALT/SGPT 5<L>, AST/SGOT 47<H>, Albumin 2.6<L>, Prealbumin --, Total Bilirubin 0.9, Hemoglobin 6.8<LL>, Hematocrit 22.4<L>, Ferritin --, C-Reactive Protein 10.71<H>, Creatine Kinase <<27>      Triglycerides, Serum: 326 mg/dL (11-30-20 @ 05:11)  Triglycerides, Serum: 289 mg/dL (11-29-20 @ 11:35)  Triglycerides, Serum: 166 mg/dL (11-25-20 @ 00:46)    POCT Blood Glucose.: 160 mg/dL (12-07-20 @ 04:57)  POCT Blood Glucose.: 164 mg/dL (12-06-20 @ 23:06)  POCT Blood Glucose.: 149 mg/dL (12-06-20 @ 16:31)  POCT Blood Glucose.: 118 mg/dL (12-06-20 @ 10:55)    Skin: no pressure injuries   Edema: 3+ generalized    Estimated Needs:   Using IBW 45.45kg in setting of BMI >50  Energy (22-25 kcal/kg): 2550-6646 kcal  Protein: (2.0-2.5 g/kg): 91-114g protein    (12/7): Torsten State Equation (REE): The Torsten State Equation (PSU) 2003b was used to calculate resting energy expenditure. RMR = 2285 kcal/day     Previous Nutrition Diagnoses:   1) Overweight/obesity - ongoing, addressed with hypocaloric, high protein tube feeds  2) Food and nutrition related knowledge deficit - intervention deferred at this time  3) Inadequate protein-energy intake - addressed and resolved with EN at goal rate  4) Increased nutrient needs - addressed with EN at goal rate    New Nutrition Diagnosis: none at this time    Recommended Interventions:   1.   2.   3.   4.     Monitoring and Evaluation:   Continue to monitor nutrition provision and tolerance, weights, labs, skin integrity.   RD remains available upon request and will follow up per protocol.    Selina Mclean RD, CDN Pager: [phone] Nutrition Follow Up Note   Patient seen for: nutrition follow up on COVID ICU     Source: medical record, communication with team. Unable to speak to pt due to current airborne isolation contact precautions related to COVID-19. Pt remains intubated.     Chart reviewed, events noted. Per chart: 61 y/o AARON, htn, DVT, PE, asthma? admitted to [facility] for cough, SOB, diarrhea found to have covid19, hospital course c/b Acute Hypoxic resp failure d/t to covid19 pna, intubated on 11/22/2020 and transfer to MICU. Patient with worsening covid PNA, new Staph PNA, (sputum with Staph), bacteremia, worsening PF ratio, failed proning 11/27 night. Renal fx worsened, urine output low, with no significant improvement with diuresis. On 11/28 patient was started on CVVHD.     Diet Order: Diet, NPO with Tube Feed:   Tube Feeding Modality: Orogastric  Nepro with Carb Steady (NEPRORTH)  Total Volume for 24 Hours (mL): 600  Continuous  Starting Tube Feed Rate {mL per Hour}: 15  Increase Tube Feed Rate by (mL): 10     Every 6 hours  Until Goal Tube Feed Rate (mL per Hour): 25  Tube Feed Duration (in Hours): 24  Tube Feed Start Time: 12:00  No Carb Prosource TF    Qty per Day:  4 (12-03-20 @ 12:16)    Current order provides: 1222 kcal and 93g protein (with 4 Prosource TF)    Previous diet orders:   11/27: Nepro @ 15 ml/hr x 24 hours + Prosource 4x/day  11/30: Nepro @ 20 ml/hr x 24 hours + Prosource 4x/day   11/30: Nepro @ 10 ml/hr x 24 hours + Prosource 4x/day   12/3: Nepro @ 25ml/hr x 24 hours + Prosource TF 4x/day - current     Nutrition Events:   - Pt proned for 20 hours on 12/4 with improved oxygenation. Nimbex discontinued due to improving oxygenation and now off Versed  - Pt keeping net even with CRRT, provider reports unsure whether pt will continue CRRT or not.   - Noted Phosphorus low (12/6 and 12/5), WNL today.   - Creatinine rising 1.96 today  - Most recent TG level 326 mg/dL (11-30); trending up, propofol d/c'd 11/30  - Hx of DM, A1c 7.5%, glucose control with NPH 10units q6H, SSI  - EN provision:       (12/07) - currently running at goal rate      (12/06)- 71% of goal volume achieved, feeds H for clogged OGT      (12/05)- 100% of goal volume      (12/04) - 38% of goal volume      (12/03)-  33% of goal volume, feeds held while pt prone      (12/02)- ran at goal of 10mL/hr    GI per chart:   - Last BM: 12/4  - Bowel regimen: Miralax  - Reglan discontinued    Anthropometric Measurements:   Height (cm): 152.4 (11-17-20 @ 20:39)  Weight (kg): 136.1 (11-17-20 @ 20:39)  BMI (kg/m2): 58.6 (11-17-20 @ 20:39)    124.4 kg (11/30), 119 kg (11/23)- will continue to monitor weights.     Medications: MEDICATIONS  (STANDING):  ALBUTerol    90 MICROgram(s) HFA Inhaler 4 Puff(s) Inhalation every 6 hours  BACItracin   Ointment 1 Application(s) Topical two times a day  ceFAZolin   IVPB 2000 milliGRAM(s) IV Intermittent every 8 hours  chlorhexidine 0.12% Liquid 15 milliLiter(s) Oral Mucosa every 12 hours  chlorhexidine 4% Liquid 1 Application(s) Topical <User Schedule>  chlorhexidine 4% Liquid 1 Application(s) Topical <User Schedule>  dexMEDEtomidine Infusion 0.2 MICROgram(s)/kG/Hr (6.81 mL/Hr) IV Continuous <Continuous>  dextrose 40% Gel 15 Gram(s) Oral once  dextrose 5%. 1000 milliLiter(s) (50 mL/Hr) IV Continuous <Continuous>  dextrose 5%. 1000 milliLiter(s) (100 mL/Hr) IV Continuous <Continuous>  dextrose 50% Injectable 25 Gram(s) IV Push once  dextrose 50% Injectable 12.5 Gram(s) IV Push once  dextrose 50% Injectable 25 Gram(s) IV Push once  glucagon  Injectable 1 milliGRAM(s) IntraMuscular once  insulin lispro (ADMELOG) corrective regimen sliding scale   SubCutaneous every 6 hours  insulin NPH human recombinant 10 Unit(s) SubCutaneous every 6 hours  ipratropium 17 MICROgram(s) HFA Inhaler 2 Puff(s) Inhalation every 6 hours  levoFLOXacin IVPB 500 milliGRAM(s) IV Intermittent every 24 hours  midodrine 10 milliGRAM(s) Oral every 8 hours  norepinephrine Infusion 0.05 MICROgram(s)/kG/Min (12.8 mL/Hr) IV Continuous <Continuous>  nystatin Powder 1 Application(s) Topical two times a day  pantoprazole  Injectable 40 milliGRAM(s) IV Push daily  petrolatum Ophthalmic Ointment 1 Application(s) Both EYES two times a day  polyethylene glycol 3350 17 Gram(s) Oral every 12 hours    MEDICATIONS  (PRN):  acetaminophen    Suspension .. 650 milliGRAM(s) Oral every 6 hours PRN Temp greater or equal to 38C (100.4F)  sodium chloride 0.9% lock flush 10 milliLiter(s) IV Push every 1 hour PRN Pre/post blood products, medications, blood draw, and to maintain line patency    Labs: 12-07 @ 07:03: Sodium --, Potassium --, Calcium --, Magnesium --, Phosphorus --, BUN --, Creatinine --, Glucose --, Alk Phos --, ALT/SGPT --, AST/SGOT --, Albumin --, Prealbumin --, Total Bilirubin --, Hemoglobin --, Hematocrit --, Ferritin 1016<H>, C-Reactive Protein --, Creatine Kinase <<27>  12-07 @ 00:38: Sodium 132<L>, Potassium 4.2, Calcium 8.8, Magnesium 2.5, Phosphorus 3.8, BUN 29<H>, Creatinine 1.96<H>, Glucose 160<H>, Alk Phos 77, ALT/SGPT 5<L>, AST/SGOT 47<H>, Albumin 2.6<L>, Prealbumin --, Total Bilirubin 0.9, Hemoglobin 6.8<LL>, Hematocrit 22.4<L>, Ferritin --, C-Reactive Protein 10.71<H>, Creatine Kinase <<27>      Triglycerides, Serum: 326 mg/dL (11-30-20 @ 05:11)  Triglycerides, Serum: 289 mg/dL (11-29-20 @ 11:35)  Triglycerides, Serum: 166 mg/dL (11-25-20 @ 00:46)    POCT Blood Glucose.: 160 mg/dL (12-07-20 @ 04:57)  POCT Blood Glucose.: 164 mg/dL (12-06-20 @ 23:06)  POCT Blood Glucose.: 149 mg/dL (12-06-20 @ 16:31)  POCT Blood Glucose.: 118 mg/dL (12-06-20 @ 10:55)    Skin: no pressure injuries   Edema: 3+ generalized    Estimated Needs:   Using IBW 45.45kg in setting of BMI >50  Energy (22-25 kcal/kg): 8174-0489 kcal  Protein: (2.0-2.5 g/kg): 91-114g protein    (12/7): Torsten State Equation (REE): The Torsten State Equation (PSU) 2003b was used to calculate resting energy expenditure. RMR = 2285 kcal/day     Previous Nutrition Diagnoses:   1) Overweight/obesity - ongoing, addressed with hypocaloric, high protein tube feeds  2) Food and nutrition related knowledge deficit - intervention deferred at this time  3) Inadequate protein-energy intake - addressed and resolved with EN at goal rate  4) Increased nutrient needs - addressed with EN at goal rate    New Nutrition Diagnosis: none at this time    Recommended Interventions:   1. With consideration for prolonged intubation, CRRT, recommend Nepro 30mL/hr x 24 hours + 2 NoCarb Prosource.  - Provides: 720mL formula, 1394kcal and 88g protein (31kcal/kg and 1.9g protein/kg IBW 45.45kg).  2. Monitor bowel movements, add to bowel regimen if needed.      Monitoring and Evaluation:   Continue to monitor nutrition provision and tolerance, weights, labs, skin integrity.   RD remains available upon request and will follow up per protocol.    Selina Mclean RD, CDN Pager: [phone]

## 2020-12-07 NOTE — PROGRESS NOTE ADULT - SUBJECTIVE AND OBJECTIVE BOX
[facility] DIVISION OF KIDNEY DISEASES AND HYPERTENSION -- FOLLOW UP NOTE  --------------------------------------------------------------------------------  Shashank Murillo   Nephrology Fellow  Pager NS: [phone]/ [facility]: [phone]  (After 5 pm or on weekends please page the on-call fellow)      Patient is a 60y old  Female who presents with a chief complaint of Acute hypoxemic respiratory failure 2/2 covid19 (07 Dec 2020 10:19)      24 hour events/subjective:  Vital signs, labs, medications reviewed. CRRT held yesterday, attempting diuretic challenge with Bumex. Remains intubated, Fio2 40, PEEP 8. Anuric. Intermittently requiring low dose IV pressors        PAST HISTORY  --------------------------------------------------------------------------------  No significant changes to PMH, PSH, FHx, SHx, unless otherwise noted    ALLERGIES & MEDICATIONS  --------------------------------------------------------------------------------  Allergies    Kiwi (Unknown)  latex (Anaphylaxis)  latex (Unknown)  penicillin (Other)  penicillin (Unknown)  perfume  hives (Other)  potassium acetate (Other)  soap additives hives (Other)    Intolerances      Standing Inpatient Medications  ALBUTerol    90 MICROgram(s) HFA Inhaler 4 Puff(s) Inhalation every 6 hours  BACItracin   Ointment 1 Application(s) Topical two times a day  buMETAnide Infusion 2 mG/Hr IV Continuous <Continuous>  buMETAnide Injectable 2 milliGRAM(s) IV Push <User Schedule>  ceFAZolin   IVPB 2000 milliGRAM(s) IV Intermittent every 8 hours  chlorhexidine 0.12% Liquid 15 milliLiter(s) Oral Mucosa every 12 hours  chlorhexidine 4% Liquid 1 Application(s) Topical <User Schedule>  chlorhexidine 4% Liquid 1 Application(s) Topical <User Schedule>  dexMEDEtomidine Infusion 0.2 MICROgram(s)/kG/Hr IV Continuous <Continuous>  dextrose 40% Gel 15 Gram(s) Oral once  dextrose 5%. 1000 milliLiter(s) IV Continuous <Continuous>  dextrose 5%. 1000 milliLiter(s) IV Continuous <Continuous>  dextrose 50% Injectable 25 Gram(s) IV Push once  dextrose 50% Injectable 12.5 Gram(s) IV Push once  dextrose 50% Injectable 25 Gram(s) IV Push once  glucagon  Injectable 1 milliGRAM(s) IntraMuscular once  insulin lispro (ADMELOG) corrective regimen sliding scale   SubCutaneous every 6 hours  insulin NPH human recombinant 10 Unit(s) SubCutaneous every 6 hours  ipratropium 17 MICROgram(s) HFA Inhaler 2 Puff(s) Inhalation every 6 hours  levoFLOXacin IVPB 500 milliGRAM(s) IV Intermittent every 24 hours  midodrine 10 milliGRAM(s) Oral every 8 hours  norepinephrine Infusion 0.05 MICROgram(s)/kG/Min IV Continuous <Continuous>  nystatin Powder 1 Application(s) Topical two times a day  pantoprazole  Injectable 40 milliGRAM(s) IV Push daily  petrolatum Ophthalmic Ointment 1 Application(s) Both EYES two times a day  polyethylene glycol 3350 17 Gram(s) Oral every 12 hours    PRN Inpatient Medications  acetaminophen    Suspension .. 650 milliGRAM(s) Oral every 6 hours PRN  sodium chloride 0.9% lock flush 10 milliLiter(s) IV Push every 1 hour PRN      REVIEW OF SYSTEMS  --------------------------------------------------------------------------------  Unable to assess      >>> <<<    VITALS/PHYSICAL EXAM  --------------------------------------------------------------------------------  T(C): 36.8 (12-07-20 @ 11:15), Max: 38.6 (12-06-20 @ 20:00)  HR: 89 (12-07-20 @ 11:51) (71 - 132)  BP: --  RR: 35 (12-07-20 @ 11:45) (30 - 41)  SpO2: 94% (12-07-20 @ 11:51) (92% - 100%)  Wt(kg): --        12-06-20 @ 07:01  -  12-07-20 @ 07:00  --------------------------------------------------------  IN: 1673.3 mL / OUT: 390 mL / NET: 1283.3 mL    12-07-20 @ 07:01  -  12-07-20 @ 12:05  --------------------------------------------------------  IN: 20.4 mL / OUT: 0 mL / NET: 20.4 mL      Physical Exam deferred 2/2 COVID19 and PPE preservation          LABS/STUDIES  --------------------------------------------------------------------------------              7.4    18.83 >-----------<  152      [12-07-20 @ 10:32]              23.9     132  |  100  |  29  ----------------------------<  160      [12-07-20 @ 00:38]  4.2   |  19  |  1.96        Ca     8.8     [12-07-20 @ 00:38]      Mg     2.5     [12-07-20 @ 00:38]      Phos  3.8     [12-07-20 @ 00:38]    TPro  5.9  /  Alb  2.6  /  TBili  0.9  /  DBili  x   /  AST  47  /  ALT  5   /  AlkPhos  77  [12-07-20 @ 00:38]    PT/INR: PT 15.9 , INR 1.34       [12-07-20 @ 00:38]  PTT: 77.9       [12-07-20 @ 00:38]          [12-07-20 @ 00:38]    Creatinine Trend:  SCr 1.96 [12-07 @ 00:38]  SCr 0.76 [12-06 @ 01:37]  SCr 0.80 [12-05 @ 21:48]  SCr 0.81 [12-05 @ 10:48]  SCr 0.95 [12-05 @ 01:07]    Urinalysis - [12-06-20 @ 22:31]      Color Dark Brown / Appearance Turbid / SG 1.032 / pH SEE NOTE      Gluc SEE NOTE / Ketone SEE NOTE  / Bili SEE NOTE / Urobili SEE NOTE       Blood SEE NOTE / Protein SEE NOTE / Leuk Est SEE NOTE / Nitrite SEE NOTE      RBC >50 / WBC 3-5 / Hyaline 0-2 / Gran  / Sq Epi  / Non Sq Epi Occasional / Bacteria Few      Ferritin 1016      [12-07-20 @ 07:03]  HbA1c 6.6      [04-16-16 @ 06:00]  Lipid: chol --, , HDL --, LDL --      [11-30-20 @ 05:11]    HCV 0.10, Nonreact      [11-19-20 @ 09:32]

## 2020-12-07 NOTE — PROGRESS NOTE ADULT - ASSESSMENT
Pt is a 59 y/o F w PMH of AARON, HTN, HLD, prior DVT/PE hx, asthma presented to [facility] for SOB and productive cough prior to admission. Admitted for acute hypoxic respiratory failure 2/2 to COVID infection. Was subsequently intubated and proned on 11/23//20. Was transferred to ICU for further management. Was started on pressors and nimbex drip. Nephrology consulted for ARTEMIO    #ARTEMIO  Pt with ARTEMIO in the setting of septic shock 2/2 to COVID infection. Now likely in ATN. Noted to have Scr of 0.53 on 11/23/20, then Scr further increased to 1.64 on 11/24/20, 2.18 on 11/25/20 and then further to 2.57 on 11/26/20 and 3.0 on 11/27. Patient was started on CRRT 11/27, held on 12/6 for diuretic challenge. Anuric thus far, electrolytes stable and can hold off on HD/RRT for today. Will reassess tmrw regarding attempting iHD vs. CRRT, depending on hemodynamics and volume requirement. Monitor labs and urine output. Avoid NSAIDs, ACEI/ARBS, RCA and nephrotoxins. Dose medications as per eGFR<10

## 2020-12-07 NOTE — PROGRESS NOTE ADULT - ATTENDING COMMENTS
Off CRRT, on diuretic trial  1.  ARF--likely RRT requiring.  BP better and may tolerate HD tomorrow with pressor support  2.  Volume overload--need to trend to avoid worsening 3.  Can SCUF to maintain volume status if diuretic resistant  3.  Respiratory failure acute--maintain weaning and keep FIO2<60%

## 2020-12-08 LAB
ALBUMIN SERPL ELPH-MCNC: 2.3 G/DL — LOW (ref 3.3–5)
ALP SERPL-CCNC: 75 U/L — SIGNIFICANT CHANGE UP (ref 40–120)
ALT FLD-CCNC: <5 U/L — LOW (ref 10–45)
ANION GAP SERPL CALC-SCNC: 17 MMOL/L — SIGNIFICANT CHANGE UP (ref 5–17)
APTT BLD: 25.7 SEC — LOW (ref 27.5–35.5)
APTT BLD: 62.9 SEC — HIGH (ref 27.5–35.5)
APTT BLD: 65.8 SEC — HIGH (ref 27.5–35.5)
AST SERPL-CCNC: 47 U/L — HIGH (ref 10–40)
BILIRUB SERPL-MCNC: 0.8 MG/DL — SIGNIFICANT CHANGE UP (ref 0.2–1.2)
BUN SERPL-MCNC: 53 MG/DL — HIGH (ref 7–23)
CALCIUM SERPL-MCNC: 9.3 MG/DL — SIGNIFICANT CHANGE UP (ref 8.4–10.5)
CHLORIDE SERPL-SCNC: 98 MMOL/L — SIGNIFICANT CHANGE UP (ref 96–108)
CO2 SERPL-SCNC: 15 MMOL/L — LOW (ref 22–31)
CREAT SERPL-MCNC: 3.19 MG/DL — HIGH (ref 0.5–1.3)
CRP SERPL-MCNC: 9.72 MG/DL — HIGH (ref 0–0.4)
D DIMER BLD IA.RAPID-MCNC: 4974 NG/ML DDU — HIGH
FERRITIN SERPL-MCNC: 1082 NG/ML — HIGH (ref 15–150)
GAS PNL BLDA: SIGNIFICANT CHANGE UP
GLUCOSE BLDC GLUCOMTR-MCNC: 124 MG/DL — HIGH (ref 70–99)
GLUCOSE BLDC GLUCOMTR-MCNC: 126 MG/DL — HIGH (ref 70–99)
GLUCOSE BLDC GLUCOMTR-MCNC: 158 MG/DL — HIGH (ref 70–99)
GLUCOSE BLDC GLUCOMTR-MCNC: 212 MG/DL — HIGH (ref 70–99)
GLUCOSE SERPL-MCNC: 128 MG/DL — HIGH (ref 70–99)
HBV SURFACE AG SER-ACNC: SIGNIFICANT CHANGE UP
HCT VFR BLD CALC: 27.6 % — LOW (ref 34.5–45)
HCT VFR BLD CALC: 28.2 % — LOW (ref 34.5–45)
HGB BLD-MCNC: 8.7 G/DL — LOW (ref 11.5–15.5)
HGB BLD-MCNC: 8.7 G/DL — LOW (ref 11.5–15.5)
INR BLD: 1.29 RATIO — HIGH (ref 0.88–1.16)
LDH SERPL L TO P-CCNC: 432 U/L — HIGH (ref 50–242)
MAGNESIUM SERPL-MCNC: 2.5 MG/DL — SIGNIFICANT CHANGE UP (ref 1.6–2.6)
MCHC RBC-ENTMCNC: 29 PG — SIGNIFICANT CHANGE UP (ref 27–34)
MCHC RBC-ENTMCNC: 29 PG — SIGNIFICANT CHANGE UP (ref 27–34)
MCHC RBC-ENTMCNC: 30.9 GM/DL — LOW (ref 32–36)
MCHC RBC-ENTMCNC: 31.5 GM/DL — LOW (ref 32–36)
MCV RBC AUTO: 92 FL — SIGNIFICANT CHANGE UP (ref 80–100)
MCV RBC AUTO: 94 FL — SIGNIFICANT CHANGE UP (ref 80–100)
NRBC # BLD: 0 /100 WBCS — SIGNIFICANT CHANGE UP (ref 0–0)
NRBC # BLD: 0 /100 WBCS — SIGNIFICANT CHANGE UP (ref 0–0)
PHOSPHATE SERPL-MCNC: 5.5 MG/DL — HIGH (ref 2.5–4.5)
PLATELET # BLD AUTO: 153 K/UL — SIGNIFICANT CHANGE UP (ref 150–400)
PLATELET # BLD AUTO: 166 K/UL — SIGNIFICANT CHANGE UP (ref 150–400)
POTASSIUM SERPL-MCNC: 4.2 MMOL/L — SIGNIFICANT CHANGE UP (ref 3.5–5.3)
POTASSIUM SERPL-SCNC: 4.2 MMOL/L — SIGNIFICANT CHANGE UP (ref 3.5–5.3)
PROT SERPL-MCNC: 6.2 G/DL — SIGNIFICANT CHANGE UP (ref 6–8.3)
PROTHROM AB SERPL-ACNC: 15.3 SEC — HIGH (ref 10.6–13.6)
RBC # BLD: 3 M/UL — LOW (ref 3.8–5.2)
RBC # BLD: 3 M/UL — LOW (ref 3.8–5.2)
RBC # FLD: 20.8 % — HIGH (ref 10.3–14.5)
RBC # FLD: 21.2 % — HIGH (ref 10.3–14.5)
SODIUM SERPL-SCNC: 130 MMOL/L — LOW (ref 135–145)
WBC # BLD: 15.33 K/UL — HIGH (ref 3.8–10.5)
WBC # BLD: 17.17 K/UL — HIGH (ref 3.8–10.5)
WBC # FLD AUTO: 15.33 K/UL — HIGH (ref 3.8–10.5)
WBC # FLD AUTO: 17.17 K/UL — HIGH (ref 3.8–10.5)

## 2020-12-08 PROCEDURE — 99232 SBSQ HOSP IP/OBS MODERATE 35: CPT

## 2020-12-08 PROCEDURE — 99291 CRITICAL CARE FIRST HOUR: CPT

## 2020-12-08 PROCEDURE — 71045 X-RAY EXAM CHEST 1 VIEW: CPT | Mod: 26

## 2020-12-08 PROCEDURE — 99233 SBSQ HOSP IP/OBS HIGH 50: CPT | Mod: GC

## 2020-12-08 RX ORDER — CITRIC ACID/SODIUM CITRATE 300-500 MG
30 SOLUTION, ORAL ORAL EVERY 6 HOURS
Refills: 0 | Status: DISCONTINUED | OUTPATIENT
Start: 2020-12-08 | End: 2020-12-08

## 2020-12-08 RX ORDER — LACTULOSE 10 G/15ML
15 SOLUTION ORAL
Refills: 0 | Status: ACTIVE | OUTPATIENT
Start: 2020-12-08 | End: 2021-11-06

## 2020-12-08 RX ORDER — NOREPINEPHRINE BITARTRATE/D5W 8 MG/250ML
0.02 PLASTIC BAG, INJECTION (ML) INTRAVENOUS
Qty: 8 | Refills: 0 | Status: DISCONTINUED | OUTPATIENT
Start: 2020-12-08 | End: 2020-12-10

## 2020-12-08 RX ADMIN — NYSTATIN CREAM 1 APPLICATION(S): 100000 CREAM TOPICAL at 05:07

## 2020-12-08 RX ADMIN — Medication 2: at 22:50

## 2020-12-08 RX ADMIN — HUMAN INSULIN 10 UNIT(S): 100 INJECTION, SUSPENSION SUBCUTANEOUS at 11:33

## 2020-12-08 RX ADMIN — POLYETHYLENE GLYCOL 3350 17 GRAM(S): 17 POWDER, FOR SOLUTION ORAL at 17:32

## 2020-12-08 RX ADMIN — Medication 2 PUFF(S): at 18:05

## 2020-12-08 RX ADMIN — Medication 1 APPLICATION(S): at 05:05

## 2020-12-08 RX ADMIN — Medication 1 APPLICATION(S): at 17:32

## 2020-12-08 RX ADMIN — PANTOPRAZOLE SODIUM 40 MILLIGRAM(S): 20 TABLET, DELAYED RELEASE ORAL at 11:28

## 2020-12-08 RX ADMIN — Medication 1 APPLICATION(S): at 17:33

## 2020-12-08 RX ADMIN — ALBUTEROL 4 PUFF(S): 90 AEROSOL, METERED ORAL at 18:04

## 2020-12-08 RX ADMIN — LACTULOSE 15 GRAM(S): 10 SOLUTION ORAL at 17:32

## 2020-12-08 RX ADMIN — HUMAN INSULIN 10 UNIT(S): 100 INJECTION, SUSPENSION SUBCUTANEOUS at 18:09

## 2020-12-08 RX ADMIN — Medication 2 PUFF(S): at 12:52

## 2020-12-08 RX ADMIN — HUMAN INSULIN 10 UNIT(S): 100 INJECTION, SUSPENSION SUBCUTANEOUS at 05:06

## 2020-12-08 RX ADMIN — CHLORHEXIDINE GLUCONATE 15 MILLILITER(S): 213 SOLUTION TOPICAL at 17:32

## 2020-12-08 RX ADMIN — Medication 2 PUFF(S): at 05:47

## 2020-12-08 RX ADMIN — ALBUTEROL 4 PUFF(S): 90 AEROSOL, METERED ORAL at 05:48

## 2020-12-08 RX ADMIN — MIDODRINE HYDROCHLORIDE 20 MILLIGRAM(S): 2.5 TABLET ORAL at 22:51

## 2020-12-08 RX ADMIN — POLYETHYLENE GLYCOL 3350 17 GRAM(S): 17 POWDER, FOR SOLUTION ORAL at 05:07

## 2020-12-08 RX ADMIN — HUMAN INSULIN 10 UNIT(S): 100 INJECTION, SUSPENSION SUBCUTANEOUS at 22:51

## 2020-12-08 RX ADMIN — ALBUTEROL 4 PUFF(S): 90 AEROSOL, METERED ORAL at 12:52

## 2020-12-08 RX ADMIN — Medication 100 MILLIGRAM(S): at 22:52

## 2020-12-08 RX ADMIN — NYSTATIN CREAM 1 APPLICATION(S): 100000 CREAM TOPICAL at 17:33

## 2020-12-08 RX ADMIN — CHLORHEXIDINE GLUCONATE 1 APPLICATION(S): 213 SOLUTION TOPICAL at 05:07

## 2020-12-08 RX ADMIN — CHLORHEXIDINE GLUCONATE 1 APPLICATION(S): 213 SOLUTION TOPICAL at 11:00

## 2020-12-08 RX ADMIN — CHLORHEXIDINE GLUCONATE 15 MILLILITER(S): 213 SOLUTION TOPICAL at 05:05

## 2020-12-08 RX ADMIN — Medication 1: at 18:08

## 2020-12-08 RX ADMIN — Medication 0: at 05:04

## 2020-12-08 RX ADMIN — MIDODRINE HYDROCHLORIDE 20 MILLIGRAM(S): 2.5 TABLET ORAL at 13:58

## 2020-12-08 RX ADMIN — Medication 1 APPLICATION(S): at 05:07

## 2020-12-08 NOTE — PROGRESS NOTE ADULT - ASSESSMENT
Pt is a 59 y/o F w PMH of AARON, HTN, HLD, prior DVT/PE hx, asthma presented to [facility] for SOB and productive cough prior to admission. Admitted for acute hypoxic respiratory failure 2/2 to COVID infection. Was subsequently intubated and proned on 11/23//20. Was transferred to ICU for further management. Was started on pressors and nimbex drip. Nephrology consulted for ARTEMIO    #ARTEMIO  Pt with ARTEMIO in the setting of septic shock 2/2 to COVID infection. Now likely in ATN. Noted to have Scr of 0.53 on 11/23/20, then Scr further increased to 1.64 on 11/24/20, 2.18 on 11/25/20 and then further to 2.57 on 11/26/20 and 3.0 on 11/27. Patient was started on CRRT 11/27, held on 12/6 for diuretic challenge. Oliguric though improving UOP, 405cc in last 24hrs. Recommend to c/w diuretic at this time. Will supplement UOP with iHD today while providing clearance. Recommend to monitor BP closely and start IV pressors if any instability noted during HD to avoid further episodes of hypotension and ATN. Monitor labs and urine output. Avoid NSAIDs, ACEI/ARBS, RCA and nephrotoxins. Dose medications as per eGFR<10

## 2020-12-08 NOTE — PROGRESS NOTE ADULT - SUBJECTIVE AND OBJECTIVE BOX
[facility] DIVISION OF KIDNEY DISEASES AND HYPERTENSION -- FOLLOW UP NOTE  --------------------------------------------------------------------------------  Shashank Murillo   Nephrology Fellow  Pager NS: [phone]/ [facility]: [phone]  (After 5 pm or on weekends please page the on-call fellow)      Patient is a 60y old  Female who presents with a chief complaint of Acute hypoxemic respiratory failure 2/2 covid19 (07 Dec 2020 14:41)      24 hour events/subjective: Vital signs, labs, medications reviewed. Remains intubated, Fio2 40%, PEEP 8. Off of IV pressors. Oliguric but improving UOP on bumex gtt, 405 cc in 24hrs. Labs notable for Na of 130        PAST HISTORY  --------------------------------------------------------------------------------  No significant changes to PMH, PSH, FHx, SHx, unless otherwise noted    ALLERGIES & MEDICATIONS  --------------------------------------------------------------------------------  Allergies    Kiwi (Unknown)  latex (Anaphylaxis)  latex (Unknown)  penicillin (Other)  penicillin (Unknown)  perfume  hives (Other)  potassium acetate (Other)  soap additives hives (Other)    Intolerances      Standing Inpatient Medications  ALBUTerol    90 MICROgram(s) HFA Inhaler 4 Puff(s) Inhalation every 6 hours  BACItracin   Ointment 1 Application(s) Topical two times a day  buMETAnide Infusion 2 mG/Hr IV Continuous <Continuous>  ceFAZolin   IVPB 2000 milliGRAM(s) IV Intermittent every 24 hours  chlorhexidine 0.12% Liquid 15 milliLiter(s) Oral Mucosa every 12 hours  chlorhexidine 4% Liquid 1 Application(s) Topical <User Schedule>  chlorhexidine 4% Liquid 1 Application(s) Topical <User Schedule>  dexMEDEtomidine Infusion 0.2 MICROgram(s)/kG/Hr IV Continuous <Continuous>  dextrose 40% Gel 15 Gram(s) Oral once  dextrose 5%. 1000 milliLiter(s) IV Continuous <Continuous>  dextrose 5%. 1000 milliLiter(s) IV Continuous <Continuous>  dextrose 50% Injectable 25 Gram(s) IV Push once  dextrose 50% Injectable 12.5 Gram(s) IV Push once  dextrose 50% Injectable 25 Gram(s) IV Push once  glucagon  Injectable 1 milliGRAM(s) IntraMuscular once  heparin  Infusion 1300 Unit(s)/Hr IV Continuous <Continuous>  insulin lispro (ADMELOG) corrective regimen sliding scale   SubCutaneous every 6 hours  insulin NPH human recombinant 10 Unit(s) SubCutaneous every 6 hours  ipratropium 17 MICROgram(s) HFA Inhaler 2 Puff(s) Inhalation every 6 hours  midodrine 20 milliGRAM(s) Oral every 8 hours  nystatin Powder 1 Application(s) Topical two times a day  pantoprazole  Injectable 40 milliGRAM(s) IV Push daily  petrolatum Ophthalmic Ointment 1 Application(s) Both EYES two times a day  polyethylene glycol 3350 17 Gram(s) Oral every 12 hours    PRN Inpatient Medications  acetaminophen    Suspension .. 650 milliGRAM(s) Oral every 6 hours PRN  sodium chloride 0.9% lock flush 10 milliLiter(s) IV Push every 1 hour PRN      REVIEW OF SYSTEMS  --------------------------------------------------------------------------------  unable to asses     >>> <<<    VITALS/PHYSICAL EXAM  --------------------------------------------------------------------------------  T(C): 36.7 (12-08-20 @ 08:00), Max: 36.8 (12-07-20 @ 09:15)  HR: 93 (12-08-20 @ 08:00) (71 - 101)  BP: 123/60 (12-08-20 @ 08:00) (123/60 - 153/66)  RR: 34 (12-08-20 @ 08:00) (26 - 49)  SpO2: 97% (12-08-20 @ 08:00) (90% - 100%)  Wt(kg): --        12-07-20 @ 07:01  -  12-08-20 @ 07:00  --------------------------------------------------------  IN: 1424.8 mL / OUT: 405 mL / NET: 1019.8 mL      Physical Exam deferred due to COVID19 and PPE preservation      LABS/STUDIES  --------------------------------------------------------------------------------              8.7    15.33 >-----------<  166      [12-08-20 @ 05:43]              27.6     130  |  98  |  53  ----------------------------<  128      [12-08-20 @ 00:13]  4.2   |  15  |  3.19        Ca     9.3     [12-08-20 @ 00:13]      Mg     2.5     [12-08-20 @ 00:13]      Phos  5.5     [12-08-20 @ 00:13]    TPro  6.2  /  Alb  2.3  /  TBili  0.8  /  DBili  x   /  AST  47  /  ALT  <5  /  AlkPhos  75  [12-08-20 @ 00:13]    PT/INR: PT 15.3 , INR 1.29       [12-08-20 @ 00:13]  PTT: 62.9       [12-08-20 @ 05:43]          [12-08-20 @ 00:13]    Creatinine Trend:  SCr 3.19 [12-08 @ 00:13]  SCr 1.96 [12-07 @ 00:38]  SCr 0.76 [12-06 @ 01:37]  SCr 0.80 [12-05 @ 21:48]  SCr 0.81 [12-05 @ 10:48]    Urinalysis - [12-06-20 @ 22:31]      Color Dark Brown / Appearance Turbid / SG 1.032 / pH SEE NOTE      Gluc SEE NOTE / Ketone SEE NOTE  / Bili SEE NOTE / Urobili SEE NOTE       Blood SEE NOTE / Protein SEE NOTE / Leuk Est SEE NOTE / Nitrite SEE NOTE      RBC >50 / WBC 3-5 / Hyaline 0-2 / Gran  / Sq Epi  / Non Sq Epi Occasional / Bacteria Few      Ferritin 1082      [12-08-20 @ 03:18]  HbA1c 6.6      [04-16-16 @ 06:00]  Lipid: chol --, , HDL --, LDL --      [11-30-20 @ 05:11]    HCV 0.10, Nonreact      [11-19-20 @ 09:32]

## 2020-12-08 NOTE — PROGRESS NOTE ADULT - ATTENDING COMMENTS
1. Acute hypoxemic respiratory with ARDS from SARS 2, Covid -19  pneumonia. Pt also with morbid obesity with BMI~58. Tolerating proning with good results. Currently  supined on AC 8 peep 40%. Trial of PS as tolerated.  2..Id. S. Aureus ,S. Hominus, and Proteus bacteremia. Continue Cefazolin. WBC  slowly decreasing. . Continue Levaquin for Stenotrophomonas.  Ct chest shows pulmonary abscess due to staph aureus. Continue Cefazolin.  3. Renal failure from ATN. For HD today.

## 2020-12-08 NOTE — PROGRESS NOTE ADULT - SUBJECTIVE AND OBJECTIVE BOX
Follow Up:  COVID19, MSSA Bacteremia, Proteus Bacteremia    Interval History:    REVIEW OF SYSTEMS  [  ] ROS unobtainable because:    [  ] All other systems negative except as noted below:     Constitutional:  [ ] fever [ ] chills  [ ] weight loss  [ ] weakness  Skin:  [ ] rash [ ] phlebitis	  Eyes: [ ] icterus [ ] pain  [ ] discharge	  ENMT: [ ] sore throat  [ ] thrush [ ] ulcers [ ] exudates  Respiratory: [  ] dyspnea [ ] hemoptysis [ ] cough [ ] sputum	  Cardiovascular:  [ ] chest pain [ ] palpitations [ ] edema	  Gastrointestinal:  [ ] nausea [ ] vomiting [ ] diarrhea [ ] constipation [ ] pain	  Genitourinary:  [ ] dysuria [ ] frequency [ ] hematuria [ ] discharge [ ] flank pain  [ ] incontinence  Musculoskeletal:  [ ] myalgias [ ] arthralgias [ ] arthritis  [ ] back pain  Neurological:  [ ] headache [ ] seizures  [ ] confusion/altered mental status    Allergies  Kiwi (Unknown)  latex (Anaphylaxis)  latex (Unknown)  penicillin (Other)  penicillin (Unknown)  perfume  hives (Other)  potassium acetate (Other)  soap additives hives (Other)        ANTIMICROBIALS:  ceFAZolin   IVPB 2000 every 24 hours      OTHER MEDS:  MEDICATIONS  (STANDING):  acetaminophen    Suspension .. 650 every 6 hours PRN  ALBUTerol    90 MICROgram(s) HFA Inhaler 4 every 6 hours  buMETAnide Infusion 2 <Continuous>  dexMEDEtomidine Infusion 0.2 <Continuous>  dextrose 40% Gel 15 once  dextrose 50% Injectable 25 once  dextrose 50% Injectable 12.5 once  dextrose 50% Injectable 25 once  glucagon  Injectable 1 once  heparin  Infusion 1300 <Continuous>  insulin lispro (ADMELOG) corrective regimen sliding scale  every 6 hours  insulin NPH human recombinant 10 every 6 hours  ipratropium 17 MICROgram(s) HFA Inhaler 2 every 6 hours  lactulose Syrup 15 two times a day  midodrine 20 every 8 hours  norepinephrine Infusion 0.02 <Continuous>  pantoprazole  Injectable 40 daily  polyethylene glycol 3350 17 every 12 hours      Vital Signs Last 24 Hrs  T(C): 36.7 (08 Dec 2020 17:00), Max: 36.7 (08 Dec 2020 08:00)  T(F): 98.1 (08 Dec 2020 17:00), Max: 98.1 (08 Dec 2020 08:00)  HR: 83 (08 Dec 2020 17:00) (74 - 96)  BP: 177/84 (08 Dec 2020 16:45) (77/57 - 177/84)  BP(mean): 115 (08 Dec 2020 16:45) (61 - 338)  RR: 37 (08 Dec 2020 17:00) (26 - 49)  SpO2: 97% (08 Dec 2020 17:00) (90% - 100%)    PHYSICAL EXAMINATION:    General: Alert and Awake, NAD  HEENT: PERRL, EOMI  Neck: Supple  Cardiac: RRR, No M/R/G  Resp: CTAB, No Wh/Rh/Ra  Abdomen: NBS, NT/ND, No HSM, No rigidity or guarding  MSK: No LE edema. No Calf tenderness  : No negro  Skin: No rashes or lesions. Skin is warm and dry to the touch.   Neuro: Alert and Awake. CN 2-12 Grossly intact. Moves all four extremities spontaneously.  Psych: Calm, Pleasant, Cooperative    LABORATORY:                          8.7    15.33 )-----------( 166      ( 08 Dec 2020 05:43 )             27.6       12    130<L>  |  98  |  53<H>  ----------------------------<  128<H>  4.2   |  15<L>  |  3.19<H>    Ca    9.3      08 Dec 2020 00:13  Phos  5.5       Mg     2.5         TPro  6.2  /  Alb  2.3<L>  /  TBili  0.8  /  DBili  x   /  AST  47<H>  /  ALT  <5<L>  /  AlkPhos  75        Urinalysis Basic - ( 06 Dec 2020 22:31 )    Color: Dark Brown / Appearance: Turbid / S.032 / pH: x  Gluc: x / Ketone: SEE NOTE  / Bili: SEE NOTE / Urobili: SEE NOTE   Blood: x / Protein: SEE NOTE / Nitrite: SEE NOTE   Leuk Esterase: SEE NOTE / RBC: >50 /hpf / WBC 3-5   Sq Epi: x / Non Sq Epi: Occasional / Bacteria: Few        C-Reactive Protein, Serum: 9.72 mg/dL (20 @ 00:13)  C-Reactive Protein, Serum: 10.71 mg/dL (20 @ 00:38)  C-Reactive Protein, Serum: 15.05 mg/dL (20 @ 01:07)      Ferritin, Serum: 1082 ng/mL (20 @ 03:18)  Ferritin, Serum: 1016 ng/mL (20 @ 07:03)  Ferritin, Serum: 1879 ng/mL (20 @ 10:06)      D-Dimer Assay, Quantitative: 4974 ng/mL DDU (20 @ 00:13)  D-Dimer Assay, Quantitative: 3097 ng/mL DDU (20 @ 00:38)  D-Dimer Assay, Quantitative: 2641 ng/mL DDU (20 @ 01:07)          MICROBIOLOGY:  v  .Blood Blood-Peripheral  20   No growth to date.  --  --      .Sputum Sputum  20   Moderate Stenotrophomonas maltophilia  Normal Respiratory Jocelynn present  --  Stenotrophomonas maltophilia      .Blood Blood-Peripheral  20   No Growth Final  --  --      .Urine Clean Catch (Midstream)  20   <10,000 CFU/mL Normal Urogenital Jocelynn  --  --      .Blood Blood-Peripheral  20   Growth in aerobic and anaerobic bottles: Staphylococcus aureus See  previous culture [id number]  --    Growth in aerobic bottle: Gram Positive Cocci in Clusters  Growth in anaerobic bottle: Gram Positive Cocci in Clusters      .Blood Blood-Peripheral  20   Growth in aerobic and anaerobic bottles: Staphylococcus aureus  Growth in anaerobic bottle: Staphylococcus hominis  Coag Negative Staphylococcus  Single set isolate, possible contaminant. Contact  Microbiology if susceptibility testing clinically  indicated.  ***Blood Panel PCR results on this specimen are available  approximately 3 hours after the Gram stain result.***  Gram stain, PCR, and/or culture results may not always  correspond due to difference in methodologies.  ************************************************************  This PCR assay was performed using [facility].  The following targets are tested for: Enterococcus,  vancomycin resistant enterococci, Listeria monocytogenes,  coagulase negative staphylococci, S. aureus,  methicillin resistant S. aureus, Streptococcus agalactiae  (Group B), S. pneumoniae, S. pyogenes (Group A),  Acinetobacter baumannii, Enterobacter cloacae, E. coli,  Klebsiella oxytoca, K. pneumoniae, Proteus sp.,  Serratia marcescens, Haemophilus influenzae,  Neisseria meningitidis, Pseudomonas aeruginosa, Candida  albicans, C. glabrata, C krusei, C parapsilosis,  C. tropicalis and the KPC resistance gene.  --  Blood Culture PCR  Staphylococcus aureus      .Sputum  20   Numerous Staphylococcus aureus  Normal Respiratory Jocelynn absent  --  Staphylococcus aureus      .Sputum Sputum  20   Moderate Staphylococcus aureus  Normal Respiratory Jocelynn absent  --  Staphylococcus aureus      .Blood Blood-Peripheral  20   No Growth Final  --  --                RADIOLOGY:    <The imaging below has been reviewed and visualized by me independently. Findings as detailed in report below> Follow Up:  COVID19, MSSA Bacteremia, Proteus Bacteremia    Interval History: underwent HD today. afebrile. remains intubated and sedated    REVIEW OF SYSTEMS  [  x] ROS unobtainable because:  intubated and sedated  [  ] All other systems negative except as noted below:     Constitutional:  [ ] fever [ ] chills  [ ] weight loss  [ ] weakness  Skin:  [ ] rash [ ] phlebitis	  Eyes: [ ] icterus [ ] pain  [ ] discharge	  ENMT: [ ] sore throat  [ ] thrush [ ] ulcers [ ] exudates  Respiratory: [  ] dyspnea [ ] hemoptysis [ ] cough [ ] sputum	  Cardiovascular:  [ ] chest pain [ ] palpitations [ ] edema	  Gastrointestinal:  [ ] nausea [ ] vomiting [ ] diarrhea [ ] constipation [ ] pain	  Genitourinary:  [ ] dysuria [ ] frequency [ ] hematuria [ ] discharge [ ] flank pain  [ ] incontinence  Musculoskeletal:  [ ] myalgias [ ] arthralgias [ ] arthritis  [ ] back pain  Neurological:  [ ] headache [ ] seizures  [ ] confusion/altered mental status    Allergies  Kiwi (Unknown)  latex (Anaphylaxis)  latex (Unknown)  penicillin (Other)  penicillin (Unknown)  perfume  hives (Other)  potassium acetate (Other)  soap additives hives (Other)        ANTIMICROBIALS:  ceFAZolin   IVPB 2000 every 24 hours      OTHER MEDS:  MEDICATIONS  (STANDING):  acetaminophen    Suspension .. 650 every 6 hours PRN  ALBUTerol    90 MICROgram(s) HFA Inhaler 4 every 6 hours  buMETAnide Infusion 2 <Continuous>  dexMEDEtomidine Infusion 0.2 <Continuous>  dextrose 40% Gel 15 once  dextrose 50% Injectable 25 once  dextrose 50% Injectable 12.5 once  dextrose 50% Injectable 25 once  glucagon  Injectable 1 once  heparin  Infusion 1300 <Continuous>  insulin lispro (ADMELOG) corrective regimen sliding scale  every 6 hours  insulin NPH human recombinant 10 every 6 hours  ipratropium 17 MICROgram(s) HFA Inhaler 2 every 6 hours  lactulose Syrup 15 two times a day  midodrine 20 every 8 hours  norepinephrine Infusion 0.02 <Continuous>  pantoprazole  Injectable 40 daily  polyethylene glycol 3350 17 every 12 hours      Vital Signs Last 24 Hrs  T(C): 36.7 (08 Dec 2020 17:00), Max: 36.7 (08 Dec 2020 08:00)  T(F): 98.1 (08 Dec 2020 17:00), Max: 98.1 (08 Dec 2020 08:00)  HR: 83 (08 Dec 2020 17:00) (74 - 96)  BP: 177/84 (08 Dec 2020 16:45) (77/57 - 177/84)  BP(mean): 115 (08 Dec 2020 16:45) (61 - 338)  RR: 37 (08 Dec 2020 17:00) (26 - 49)  SpO2: 97% (08 Dec 2020 17:00) (90% - 100%)    PHYSICAL EXAMINATION:  General: Intubated and Sedated  HEENT: +ETT  Neck: Supple  Cardiac: RRR, No M/R/G  Resp: CTAB, No Wh/Rh/Ra  Abdomen: NBS, NT/ND, No HSM, No rigidity or guarding  MSK: No LE edema. No Calf tenderness  : Rivera  Skin: No rashes or lesions. Skin is warm and dry to the touch.   Vascular: RIJ and LIJ (No surrounding erythema, drainage or tenderness to palpation)  Neuro: Intubated and Sedated  Psych: Unable to assess - intubated and sedated    LABORATORY:                          8.7    15.33 )-----------( 166      ( 08 Dec 2020 05:43 )             27.6       12-08    130<L>  |  98  |  53<H>  ----------------------------<  128<H>  4.2   |  15<L>  |  3.19<H>    Ca    9.3      08 Dec 2020 00:13  Phos  5.5     12  Mg     2.5         TPro  6.2  /  Alb  2.3<L>  /  TBili  0.8  /  DBili  x   /  AST  47<H>  /  ALT  <5<L>  /  AlkPhos  75  12-08      Urinalysis Basic - ( 06 Dec 2020 22:31 )    Color: Dark Brown / Appearance: Turbid / S.032 / pH: x  Gluc: x / Ketone: SEE NOTE  / Bili: SEE NOTE / Urobili: SEE NOTE   Blood: x / Protein: SEE NOTE / Nitrite: SEE NOTE   Leuk Esterase: SEE NOTE / RBC: >50 /hpf / WBC 3-5   Sq Epi: x / Non Sq Epi: Occasional / Bacteria: Few    C-Reactive Protein, Serum: 9.72 mg/dL (20 @ 00:13)  C-Reactive Protein, Serum: 10.71 mg/dL (20 @ 00:38)  C-Reactive Protein, Serum: 15.05 mg/dL (20 @ 01:07)    Ferritin, Serum: 1082 ng/mL (20 @ 03:18)  Ferritin, Serum: 1016 ng/mL (20 @ 07:03)  Ferritin, Serum: 1879 ng/mL (20 @ 10:06)    D-Dimer Assay, Quantitative: 4974 ng/mL DDU (20 @ 00:13)  D-Dimer Assay, Quantitative: 3097 ng/mL DDU (20 @ 00:38)  D-Dimer Assay, Quantitative: 2641 ng/mL DDU (20 @ 01:07)    MICROBIOLOGY:    .Blood Blood-Peripheral  20   No growth to date.  --  --    .Sputum Sputum  20   Moderate Stenotrophomonas maltophilia  Normal Respiratory Jocelynn present  --  Stenotrophomonas maltophilia      .Blood Blood-Peripheral  20   No Growth Final  --  --      .Urine Clean Catch (Midstream)  20   <10,000 CFU/mL Normal Urogenital Jocelynn  --  --      .Blood Blood-Peripheral  20   Growth in aerobic and anaerobic bottles: Staphylococcus aureus See  previous culture [id number]  --    Growth in aerobic bottle: Gram Positive Cocci in Clusters  Growth in anaerobic bottle: Gram Positive Cocci in Clusters      .Blood Blood-Peripheral  20   Growth in aerobic and anaerobic bottles: Staphylococcus aureus  Growth in anaerobic bottle: Staphylococcus hominis  Coag Negative Staphylococcus  Single set isolate, possible contaminant. Contact  Microbiology if susceptibility testing clinically  indicated.  ***Blood Panel PCR results on this specimen are available  approximately 3 hours after the Gram stain result.***  Gram stain, PCR, and/or culture results may not always  correspond due to difference in methodologies.  ************************************************************  This PCR assay was performed using [facility].  The following targets are tested for: Enterococcus,  vancomycin resistant enterococci, Listeria monocytogenes,  coagulase negative staphylococci, S. aureus,  methicillin resistant S. aureus, Streptococcus agalactiae  (Group B), S. pneumoniae, S. pyogenes (Group A),  Acinetobacter baumannii, Enterobacter cloacae, E. coli,  Klebsiella oxytoca, K. pneumoniae, Proteus sp.,  Serratia marcescens, Haemophilus influenzae,  Neisseria meningitidis, Pseudomonas aeruginosa, Candida  albicans, C. glabrata, C krusei, C parapsilosis,  C. tropicalis and the KPC resistance gene.  --  Blood Culture PCR  Staphylococcus aureus      .Sputum  20   Numerous Staphylococcus aureus  Normal Respiratory Jocelynn absent  --  Staphylococcus aureus      .Sputum Sputum  20   Moderate Staphylococcus aureus  Normal Respiratory Jocelynn absent  --  Staphylococcus aureus      .Blood Blood-Peripheral  20   No Growth Final  --  --    RADIOLOGY:    <The imaging below has been reviewed and visualized by me independently. Findings as detailed in report below>    EXAM:  CT ABDOMEN AND PELVIS IC                        EXAM:  CT CHEST IC                        PROCEDURE DATE:  2020    New large consolidation with areas of cavitation in the right lung. Multifocal pneumonia with cavitation is a consideration.  No acute intra-abdominal pathology.

## 2020-12-08 NOTE — PROGRESS NOTE ADULT - ASSESSMENT
60 year old F w/ pmh of HTN, DVT on Xarelto, AARON who presented w/ fevers found to have COVI w/ superimposed PNA and bacteremia complicated by hypoxic respiratory failure s/p intubation, septic shock and ATN requiring CRRT has been off CRRT since 12/6 with diuresis challenge now making little progress with bumex gtt and metolazone.  .    Neuro  -over night opened her eyes spontaneously and no other response  however now no mental status on Precedex gtt @ 0.03   -c/w neuro checks ; if not improvement would get CT H   -ammonia level 52 ; will add lactulose BID     Resp  Acute hypoxic resp failure 2/2 covid PNA, R side cavitary abscesses  on Ancef (12/3)  and sputum  Moderate Stenotrophomonas maltophilia Levaquin (12/2)    - 30/350/40% P 8  -was proned 12/4  -aspiration precaution   -CXR      CV  - Midodrine to 20 TID will titrate down as tolerated   -will give Bumex 4 mg IVP followed by bumex gtt will little response however @ 11 pm was given metolazone 5mg x 1 with better response    - monitor electrolytes   -repeat CBC stable ; pt is s/p 1 PRBC 12/7 ; hgb 6.8-->7.4-->8.1--> 8.7-->8.7   -hx of  DVt on Xarelto was transitioned to heparin gtt than on hold due to anemia now since hgb stable restarted to full AC   -pending repeat [facility] duplex   - CT negative for PE    GI  - NPO on TF   - c/w  bowel regimen , add lactulose   -c/w PPI       -ARTEMIO s/p CRRT last tx 12/6 now on Bumex challenge with metolazone on board ; improved UO 30-40 ml / hr ; c/w strict i&o's   -worsening BUN / Cr / co2   -start Bicitra via OGT ; follow BMP / VBG q 12   -R IJDB (12/27/20) in place   -abx dosed based on pt's Cr clearance if start CRRT will require change back  to prior dosing   -pt was found to have blood cloth obstructing negro cath 12/7 ; s/p  irrigation q 4 hrs now urine clear no cloths   -daily weight , strict I&os   -nephrology input appreciated.       ID  Covid 19  - Completed remdesivir, dexamethasone discontinued in setting of bacteremia  -antibiotics are renally doses since now pt is off CRRT ; however if back on CRRT will need to be re-doses   - + Sputum MSSA , + bacteremia P mirablis . CTA chest  revealing R side  cavitary  abscess due to staph aureus on  Cefazolin 2g q 24 hrs 12/3/20- plan > 6 weeks of antibiotics.  -, Sputum cx Strenotrophemonas on Levofloxacin 500mg q 48 hrs  12-2 - end date 12/8   - caspofungin discontinued, no fungal growth.   - WBC downtrended from  18 -->17-->15  -arrow 20g PIV x 2 placed 12/7 and central line and sukhi were d/c 12/7  -ID input appreciated.     [facility]  Full code . family updated on current status.        60 year old F w/ pmh of HTN, DVT on Xarelto, AARON who presented w/ fevers found to have COVI w/ superimposed PNA and bacteremia complicated by hypoxic respiratory failure s/p intubation, septic shock and ATN requiring CRRT has been off CRRT since 12/6 with diuresis challenge now making little progress with bumex gtt and metolazone.  .    Neuro  -over night opened her eyes spontaneously and no other response  however now no mental status on Precedex gtt @ 0.03   -c/w neuro checks ; if not improvement would get CT H   -ammonia level 52 ; will add lactulose BID     Resp  Acute hypoxic resp failure 2/2 covid PNA, R side cavitary abscesses  on Ancef (12/3)  and sputum  Moderate Stenotrophomonas maltophilia Levaquin (12/2)    - 30/350/40% P 8  -was proned 12/4  -aspiration precaution   -CXR   -will start weaning trials      CV  - Midodrine to 20 TID will titrate down as tolerated   -will give Bumex 4 mg IVP followed by bumex gtt will little response however @ 11 pm was given metolazone 5mg x 1 with better response    - monitor electrolytes   -repeat CBC stable ; pt is s/p 1 PRBC 12/7 ; hgb 6.8-->7.4-->8.1--> 8.7-->8.7   -hx of  DVt on Xarelto was transitioned to heparin gtt than on hold due to anemia now since hgb stable restarted to full AC   - [facility] duplex of b/l LE negative   - CT negative for PE    GI  - NPO on TF   - c/w  bowel regimen , add lactulose   -c/w PPI       -ARTEMIO s/p CRRT last tx 12/6 now on Bumex challenge with metolazone on board ; improved UO 30-40 ml / hr ; c/w strict i&o's   -worsening BUN / Cr / co2   -start Bicitra via OGT ; follow BMP / VBG q 12   -R IJDB (12/27/20) in place   -abx dosed based on pt's Cr clearance if start CRRT will require change back  to prior dosing   -pt was found to have blood cloth obstructing negro cath 12/7 ; s/p  irrigation q 4 hrs now urine clear no cloths   -daily weight , strict I&os   -nephrology input appreciated.       ID  Covid 19  - Completed remdesivir, dexamethasone discontinued in setting of bacteremia  -antibiotics are renally doses since now pt is off CRRT ; however if back on CRRT will need to be re-doses   - + Sputum MSSA , + bacteremia P mirablis . CTA chest  revealing R side  cavitary  abscess due to staph aureus on  Cefazolin 2g q 24 hrs 12/3/20- plan > 6 weeks of antibiotics.  -, Sputum cx Strenotrophemonas on Levofloxacin 500mg q 48 hrs  12-2 - end date 12/8   - caspofungin discontinued, no fungal growth.   - WBC downtrended from  18 -->17-->15  -arrow 20g PIV x 2 placed 12/7 and central line and sukhi were d/c 12/7  -ID input appreciated.     [facility]  Full code . family updated on current status.

## 2020-12-08 NOTE — PROGRESS NOTE ADULT - ATTENDING COMMENTS
Off CRRT-->IHD, remains intubated  1.  ARF--overall clearances fine and on that basis IHD appropriate with pressor support to achieve UF goals  2.  Respiratory failure acute--volume optimize to keep FIO2<0.6  3.  Hypotension--improved.  May require intradialytic support

## 2020-12-08 NOTE — PROGRESS NOTE ADULT - SUBJECTIVE AND OBJECTIVE BOX
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 Reports that her  and son live with her and they have tested so far negative for covid and are asymptomatic, patient is not sure where was exposed. Reports trying her symbicort this week for shortness of breath without much relief. Patient yesterday began to have fevers with highest fever at 104F which alarmed her and for that reason she came to the hospital for help  (2020 05:46). Pt found to be COVID + and completed remdesivir -. On  pt became hypoxic w/ increasign WOB and was intubated for airway protection and transferred to the COVID ICU. The patient was also proned that night for 18 hours. Hosp course was complicated by ATN which was unresponsive to bumex challenge. On  the pt was consented for CVVHDF. Her course was also complicated by MSSA/P. mirablis bacteremia potentially due to urine vs line-associated and MSSA in sputum due to PNA. Pt on antibiotics and antifungals for WBC in the 40s.  On  Pt proned 20 hours with improved in pO2. Now supine and FiO2 reduced to 60%. On  pt went to CTA C/A/P due to persistently elevated WBCs in the setting of being on antibiotics and found to have R side  large cavitary lesions ; on Ancef 12/3 and Levaquin  w/ plan for 6 week tx.       ROS:  Unable to ROS as pt is not responding .     Allergies    Kiwi (Unknown)  latex (Anaphylaxis)  latex (Unknown)  penicillin (Other)  penicillin (Unknown)  perfume  hives (Other)  potassium acetate (Other)  soap additives hives (Other)    Intolerances        ANTIBIOTICS/RELEVANT:  antimicrobials  ceFAZolin   IVPB 2000 milliGRAM(s) IV Intermittent every 8 hours  levoFLOXacin IVPB 500 milliGRAM(s) IV Intermittent every 24 hours    immunologic:    OTHER:  acetaminophen    Suspension .. 650 milliGRAM(s) Oral every 6 hours PRN  ALBUTerol    90 MICROgram(s) HFA Inhaler 4 Puff(s) Inhalation every 6 hours  BACItracin   Ointment 1 Application(s) Topical two times a day  buMETAnide Infusion 2 mG/Hr IV Continuous <Continuous>  buMETAnide Injectable 4 milliGRAM(s) IV Push once  chlorhexidine 0.12% Liquid 15 milliLiter(s) Oral Mucosa every 12 hours  chlorhexidine 4% Liquid 1 Application(s) Topical <User Schedule>  chlorhexidine 4% Liquid 1 Application(s) Topical <User Schedule>  dexMEDEtomidine Infusion 0.2 MICROgram(s)/kG/Hr IV Continuous <Continuous>  dextrose 40% Gel 15 Gram(s) Oral once  dextrose 5%. 1000 milliLiter(s) IV Continuous <Continuous>  dextrose 5%. 1000 milliLiter(s) IV Continuous <Continuous>  dextrose 50% Injectable 25 Gram(s) IV Push once  dextrose 50% Injectable 12.5 Gram(s) IV Push once  dextrose 50% Injectable 25 Gram(s) IV Push once  glucagon  Injectable 1 milliGRAM(s) IntraMuscular once  insulin lispro (ADMELOG) corrective regimen sliding scale   SubCutaneous every 6 hours  insulin NPH human recombinant 10 Unit(s) SubCutaneous every 6 hours  ipratropium 17 MICROgram(s) HFA Inhaler 2 Puff(s) Inhalation every 6 hours  midodrine 10 milliGRAM(s) Oral every 8 hours  norepinephrine Infusion 0.05 MICROgram(s)/kG/Min IV Continuous <Continuous>  nystatin Powder 1 Application(s) Topical two times a day  pantoprazole  Injectable 40 milliGRAM(s) IV Push daily  petrolatum Ophthalmic Ointment 1 Application(s) Both EYES two times a day  polyethylene glycol 3350 17 Gram(s) Oral every 12 hours  sodium chloride 0.9% lock flush 10 milliLiter(s) IV Push every 1 hour PRN      Objective:  Vital Signs Last 24 Hrs  ICU Vital Signs Last 24 Hrs  T(C): 36.7 (08 Dec 2020 09:00), Max: 36.8 (07 Dec 2020 10:30)  T(F): 98.1 (08 Dec 2020 09:00), Max: 98.2 (07 Dec 2020 10:30)  HR: 89 (08 Dec 2020 09:00) (71 - 101)  BP: 102/51 (08 Dec 2020 09:00) (102/51 - 153/66)  BP(mean): 73 (08 Dec 2020 09:00) (73 - 338)  ABP: 136/67 (07 Dec 2020 18:00) (101/70 - 227/75)  ABP(mean): 83 (07 Dec 2020 18:00) (49 - 130)  RR: 34 (08 Dec 2020 09:00) (26 - 49)  SpO2: 94% (08 Dec 2020 09:00) (90% - 100%)      PHYSICAL EXAM:  General - morbidly obese lady , intubated on vent , currently on Precedex @ 0.3 not following any commands , dose not open to any stimuli,  eyes closed , no in visible distress   HEENT - ET and OGT secured at the lip line. Chin with large wound with  bacitracin ointment   CV - s1 & s2 + ,   Resp - good air entry , diminished breath sounds at the bases , remains intubated   Abdomen - obese , soft , BS x 4 quadrants   ; negro cath with clear yellow urine   Extremities - no edema , good pulses and capillary refills < 3 sec   Skin - chin with large wound , otherwise warm and dry skin       LABS:                                    8.7    15.33 )-----------( 166      ( 08 Dec 2020 05:43 )             27.6     12-08    130<L>  |  98  |  53<H>  ----------------------------<  128<H>  4.2   |  15<L>  |  3.19<H>    Ca    9.3      08 Dec 2020 00:13  Phos  5.5     12-08  Mg     2.5     12-08    TPro  6.2  /  Alb  2.3<L>  /  TBili  0.8  /  DBili  x   /  AST  47<H>  /  ALT  <5<L>  /  AlkPhos  75  12-08        PT/INR - ( 08 Dec 2020 00:13 )   PT: 15.3 sec;   INR: 1.29 ratio         PTT - ( 08 Dec 2020 05:43 )  PTT:62.9     Urine; secColor: Dark Brown / Appearance: Turbid / S.032 / pH: x  Gluc: x / Ketone: SEE NOTE  / Bili: SEE NOTE / Urobili: SEE NOTE   Blood: x / Protein: SEE NOTE / Nitrite: SEE NOTE   Leuk Esterase: SEE NOTE / RBC: >50 /hpf / WBC 3-5   Sq Epi: x / Non Sq Epi: Occasional / Bacteria: Few          MICROBIOLOGY:  Culture - Sputum . (20 @ 08:55) -  Levofloxacin: S <=0.5; Moderate Stenotrophomonas maltophilia    Culture - Blood (20 @ 05:21)Organism Identification: Blood Culture PCR Proteus mirabilis, Staphylococcus aureus    Culture - Blood (20 @ 04:48)    Specimen Source: .Blood Blood-Peripheral    Culture Results:   No growth to date.    Culture - Blood (20 @ 04:32)    Specimen Source: .Blood Blood-Peripheral    Culture Results:   No Growth Final      RADIOLOGY & ADDITIONAL STUDIES:    < from: Xray Chest 1 View- PORTABLE-Urgent (Xray Chest 1 View- PORTABLE-Urgent .) (20 @ 20:17) >    EXAM:  XR CHEST PORTABLE URGENT 1V                            PROCEDURE DATE:  2020            INTERPRETATION:  A single chest x-ray was obtained on 2020.    Indication: OG placement.    Impression:    The heart is enlarged. Diffuse airspace opacities are seen throughout post lungs accentuated in the right midlung field and right upper lobe. Correlate with CT scan that was performed the same date. Changes compatible with COVID pneumonia. Endotracheal tube is in good position. OG tube is in the stomach. A central line is seen on the left and the tip is in the superior vena cava. The right central line was removed.    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 Reports that her  and son live with her and they have tested so far negative for covid and are asymptomatic, patient is not sure where was exposed. Reports trying her symbicort this week for shortness of breath without much relief. Patient yesterday began to have fevers with highest fever at 104F which alarmed her and for that reason she came to the hospital for help  (2020 05:46). Pt found to be COVID + and completed remdesivir -. On  pt became hypoxic w/ increasign WOB and was intubated for airway protection and transferred to the COVID ICU. The patient was also proned that night for 18 hours. Hosp course was complicated by ATN which was unresponsive to bumex challenge. On  the pt was consented for CVVHDF. Her course was also complicated by MSSA/P. mirablis bacteremia potentially due to urine vs line-associated and MSSA in sputum due to PNA. Pt on antibiotics and antifungals for WBC in the 40s.  On  Pt proned 20 hours with improved in pO2. Now supine and FiO2 reduced to 60%. On  pt went to CTA C/A/P due to persistently elevated WBCs in the setting of being on antibiotics and found to have R side  large cavitary lesions ; on Ancef 12/3 and Levaquin  w/ plan for 6 week tx.       ROS:  Unable to ROS as pt is not responding .     Allergies    Kiwi (Unknown)  latex (Anaphylaxis)  latex (Unknown)  penicillin (Other)  penicillin (Unknown)  perfume  hives (Other)  potassium acetate (Other)  soap additives hives (Other)    Intolerances        ANTIBIOTICS/RELEVANT:  antimicrobials  ceFAZolin   IVPB 2000 milliGRAM(s) IV Intermittent every 8 hours  levoFLOXacin IVPB 500 milliGRAM(s) IV Intermittent every 24 hours    immunologic:    OTHER:  acetaminophen    Suspension .. 650 milliGRAM(s) Oral every 6 hours PRN  ALBUTerol    90 MICROgram(s) HFA Inhaler 4 Puff(s) Inhalation every 6 hours  BACItracin   Ointment 1 Application(s) Topical two times a day  buMETAnide Infusion 2 mG/Hr IV Continuous <Continuous>  buMETAnide Injectable 4 milliGRAM(s) IV Push once  chlorhexidine 0.12% Liquid 15 milliLiter(s) Oral Mucosa every 12 hours  chlorhexidine 4% Liquid 1 Application(s) Topical <User Schedule>  chlorhexidine 4% Liquid 1 Application(s) Topical <User Schedule>  dexMEDEtomidine Infusion 0.2 MICROgram(s)/kG/Hr IV Continuous <Continuous>  dextrose 40% Gel 15 Gram(s) Oral once  dextrose 5%. 1000 milliLiter(s) IV Continuous <Continuous>  dextrose 5%. 1000 milliLiter(s) IV Continuous <Continuous>  dextrose 50% Injectable 25 Gram(s) IV Push once  dextrose 50% Injectable 12.5 Gram(s) IV Push once  dextrose 50% Injectable 25 Gram(s) IV Push once  glucagon  Injectable 1 milliGRAM(s) IntraMuscular once  insulin lispro (ADMELOG) corrective regimen sliding scale   SubCutaneous every 6 hours  insulin NPH human recombinant 10 Unit(s) SubCutaneous every 6 hours  ipratropium 17 MICROgram(s) HFA Inhaler 2 Puff(s) Inhalation every 6 hours  midodrine 10 milliGRAM(s) Oral every 8 hours  norepinephrine Infusion 0.05 MICROgram(s)/kG/Min IV Continuous <Continuous>  nystatin Powder 1 Application(s) Topical two times a day  pantoprazole  Injectable 40 milliGRAM(s) IV Push daily  petrolatum Ophthalmic Ointment 1 Application(s) Both EYES two times a day  polyethylene glycol 3350 17 Gram(s) Oral every 12 hours  sodium chloride 0.9% lock flush 10 milliLiter(s) IV Push every 1 hour PRN      Objective:  ICU Vital Signs Last 24 Hrs  T(C): 36.7 (08 Dec 2020 09:00), Max: 36.8 (07 Dec 2020 10:30)  T(F): 98.1 (08 Dec 2020 09:00), Max: 98.2 (07 Dec 2020 10:30)  HR: 89 (08 Dec 2020 09:00) (71 - 101)  BP: 102/51 (08 Dec 2020 09:00) (102/51 - 153/66)  BP(mean): 73 (08 Dec 2020 09:00) (73 - 338)  ABP: 136/67 (07 Dec 2020 18:00) (101/70 - 227/75)  ABP(mean): 83 (07 Dec 2020 18:00) (49 - 130)  RR: 34 (08 Dec 2020 09:00) (26 - 49)  SpO2: 94% (08 Dec 2020 09:00) (90% - 100%)        PHYSICAL EXAM:  General - morbidly obese lady , intubated on vent , currently on Precedex @ 0.3 not following any commands , dose not open to any stimuli,  eyes closed , no in visible distress   HEENT - ET and OGT secured at the lip line. Chin with large wound with  bacitracin ointment   CV - s1 & s2 + ,   Resp - good air entry , diminished breath sounds at the bases , remains intubated   Abdomen - obese , soft , BS x 4 quadrants   ; negro cath with clear yellow urine   Extremities - no edema , good pulses and capillary refills < 3 sec   Skin - chin with large wound , otherwise warm and dry skin       LABS:                                    8.7    15.33 )-----------( 166      ( 08 Dec 2020 05:43 )             27.6     12-08    130<L>  |  98  |  53<H>  ----------------------------<  128<H>  4.2   |  15<L>  |  3.19<H>    Ca    9.3      08 Dec 2020 00:13  Phos  5.5     12-08  Mg     2.5     12-08    TPro  6.2  /  Alb  2.3<L>  /  TBili  0.8  /  DBili  x   /  AST  47<H>  /  ALT  <5<L>  /  AlkPhos  75  12-08        PT/INR - ( 08 Dec 2020 00:13 )   PT: 15.3 sec;   INR: 1.29 ratio         PTT - ( 08 Dec 2020 05:43 )  PTT:62.9     Urine; secColor: Dark Brown / Appearance: Turbid / S.032 / pH: x  Gluc: x / Ketone: SEE NOTE  / Bili: SEE NOTE / Urobili: SEE NOTE   Blood: x / Protein: SEE NOTE / Nitrite: SEE NOTE   Leuk Esterase: SEE NOTE / RBC: >50 /hpf / WBC 3-5   Sq Epi: x / Non Sq Epi: Occasional / Bacteria: Few          MICROBIOLOGY:  Culture - Sputum . (20 @ 08:55) -  Levofloxacin: S <=0.5; Moderate Stenotrophomonas maltophilia    Culture - Blood (20 @ 05:21)Organism Identification: Blood Culture PCR Proteus mirabilis, Staphylococcus aureus    Culture - Blood (20 @ 04:48)    Specimen Source: .Blood Blood-Peripheral    Culture Results:   No growth to date.    Culture - Blood (20 @ 04:32)    Specimen Source: .Blood Blood-Peripheral    Culture Results:   No Growth Final      RADIOLOGY & ADDITIONAL STUDIES:    < from: Xray Chest 1 View- PORTABLE-Urgent (Xray Chest 1 View- PORTABLE-Urgent .) (20 @ 20:17) >    EXAM:  XR CHEST PORTABLE URGENT 1V                            PROCEDURE DATE:  2020            INTERPRETATION:  A single chest x-ray was obtained on 2020.    Indication: OG placement.    Impression:    The heart is enlarged. Diffuse airspace opacities are seen throughout post lungs accentuated in the right midlung field and right upper lobe. Correlate with CT scan that was performed the same date. Changes compatible with COVID pneumonia. Endotracheal tube is in good position. OG tube is in the stomach. A central line is seen on the left and the tip is in the superior vena cava. The right central line was removed.    < end of copied text >

## 2020-12-08 NOTE — PROGRESS NOTE ADULT - ASSESSMENT
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 - used symbicort intermittently), AARON, peritonitis s/p Oral's procedure and ileostomy (reversed 2011) admitted with COVID19    Developed MSSA Bacteremia  Sputum culture with MSSA (likely PNA as initial source)  BCx also with Proteus (Cefazolin susceptible) - ?Urinary Source  Will need line replacement as patient was actively bacteremia at time of currently line placement  Patient also with Stenotrophomonas in sputum on 12/1    CT C/A/P (12/4) with New large consolidation with areas of cavitation in the right lung  Admission CTA Chest without cavitation so I suspect this finding is secondary to MSSA Pneumonia  Reassuringly no abscess in CT Chest or CT A/P  Would continue Cefazolin at this time    RECOMMENDATIONS:    #Positive Sputum Culture (Stenotrophomonas)  --Complete Levofloxacin after today's dose    #MSSA bacteremia, Leukocytosis (worsening), Cavitary Pneumonia   --Continue Cefazolin to 2g IV Q24H if not restarting CRRT (2g IV Q12H if restarting CRRT). If patient consistently requires HD would decrease Cefazolin to 1g IV Q24H (dosed in the PM to be after HD)  --Continue to follow CBC with diff  --Continue to follow renal function (Cr/BUN)  --Continue to follow temperature curve  --Follow up on preliminary blood cultures     #P. mirablis bacteremia  --s/p course of antibiotics    #COVID-19, Hypoxic Respiratory Failure (persistent)  - please hold further Dexamethasone (in setting of active bacteremia)  - Continue supplemental O2 and supportive care per primary team  - Continue Contact and Airborne Isolation precautions    #Antibiotic allergy  - uncertain reaction - tolerated Ceftriaxone, Cefepime and Cefazolin in this admission  - outpatient  follow-up with Allergy/Immunology    I will continue to follow. Please feel free to contact me with any further questions.    Femi Adkins M.D.  [facility] Division of Infectious Disease  8AM-5PM: Pager Number [phone]  After Hours (or if no response): Please contact the Infectious Diseases Office at [phone]     The above assessment and plan were discussed with 5ICU NP

## 2020-12-09 LAB
ALBUMIN SERPL ELPH-MCNC: 2.5 G/DL — LOW (ref 3.3–5)
ALP SERPL-CCNC: 90 U/L — SIGNIFICANT CHANGE UP (ref 40–120)
ALT FLD-CCNC: <5 U/L — LOW (ref 10–45)
ANION GAP SERPL CALC-SCNC: 16 MMOL/L — SIGNIFICANT CHANGE UP (ref 5–17)
ANION GAP SERPL CALC-SCNC: 17 MMOL/L — SIGNIFICANT CHANGE UP (ref 5–17)
APPEARANCE UR: ABNORMAL
APTT BLD: 101.5 SEC — HIGH (ref 27.5–35.5)
AST SERPL-CCNC: 43 U/L — HIGH (ref 10–40)
BACTERIA # UR AUTO: NEGATIVE — SIGNIFICANT CHANGE UP
BILIRUB SERPL-MCNC: 0.7 MG/DL — SIGNIFICANT CHANGE UP (ref 0.2–1.2)
BILIRUB UR-MCNC: NEGATIVE — SIGNIFICANT CHANGE UP
BUN SERPL-MCNC: 40 MG/DL — HIGH (ref 7–23)
BUN SERPL-MCNC: 56 MG/DL — HIGH (ref 7–23)
CALCIUM SERPL-MCNC: 9 MG/DL — SIGNIFICANT CHANGE UP (ref 8.4–10.5)
CALCIUM SERPL-MCNC: 9.5 MG/DL — SIGNIFICANT CHANGE UP (ref 8.4–10.5)
CHLORIDE SERPL-SCNC: 97 MMOL/L — SIGNIFICANT CHANGE UP (ref 96–108)
CHLORIDE SERPL-SCNC: 98 MMOL/L — SIGNIFICANT CHANGE UP (ref 96–108)
CO2 SERPL-SCNC: 18 MMOL/L — LOW (ref 22–31)
CO2 SERPL-SCNC: 19 MMOL/L — LOW (ref 22–31)
COLOR SPEC: YELLOW — SIGNIFICANT CHANGE UP
CREAT SERPL-MCNC: 2.57 MG/DL — HIGH (ref 0.5–1.3)
CREAT SERPL-MCNC: 3.21 MG/DL — HIGH (ref 0.5–1.3)
CRP SERPL-MCNC: 10.28 MG/DL — HIGH (ref 0–0.4)
CULTURE RESULTS: NO GROWTH — SIGNIFICANT CHANGE UP
D DIMER BLD IA.RAPID-MCNC: 2273 NG/ML DDU — HIGH
DIFF PNL FLD: ABNORMAL
EPI CELLS # UR: 6 /HPF — HIGH (ref 0–5)
FERRITIN SERPL-MCNC: 995 NG/ML — HIGH (ref 15–150)
GAS PNL BLDA: SIGNIFICANT CHANGE UP
GLUCOSE BLDC GLUCOMTR-MCNC: 167 MG/DL — HIGH (ref 70–99)
GLUCOSE BLDC GLUCOMTR-MCNC: 179 MG/DL — HIGH (ref 70–99)
GLUCOSE BLDC GLUCOMTR-MCNC: 188 MG/DL — HIGH (ref 70–99)
GLUCOSE BLDC GLUCOMTR-MCNC: 219 MG/DL — HIGH (ref 70–99)
GLUCOSE SERPL-MCNC: 193 MG/DL — HIGH (ref 70–99)
GLUCOSE SERPL-MCNC: 220 MG/DL — HIGH (ref 70–99)
GLUCOSE UR QL: NEGATIVE — SIGNIFICANT CHANGE UP
HCT VFR BLD CALC: 27.4 % — LOW (ref 34.5–45)
HGB BLD-MCNC: 8.6 G/DL — LOW (ref 11.5–15.5)
HYALINE CASTS # UR AUTO: 0 /LPF — SIGNIFICANT CHANGE UP (ref 0–7)
INR BLD: 1.52 RATIO — HIGH (ref 0.88–1.16)
KETONES UR-MCNC: NEGATIVE — SIGNIFICANT CHANGE UP
LDH SERPL L TO P-CCNC: 359 U/L — HIGH (ref 50–242)
LEUKOCYTE ESTERASE UR-ACNC: ABNORMAL
MAGNESIUM SERPL-MCNC: 2.1 MG/DL — SIGNIFICANT CHANGE UP (ref 1.6–2.6)
MAGNESIUM SERPL-MCNC: 2.2 MG/DL — SIGNIFICANT CHANGE UP (ref 1.6–2.6)
MCHC RBC-ENTMCNC: 29.3 PG — SIGNIFICANT CHANGE UP (ref 27–34)
MCHC RBC-ENTMCNC: 31.4 GM/DL — LOW (ref 32–36)
MCV RBC AUTO: 93.2 FL — SIGNIFICANT CHANGE UP (ref 80–100)
NITRITE UR-MCNC: NEGATIVE — SIGNIFICANT CHANGE UP
NRBC # BLD: 0 /100 WBCS — SIGNIFICANT CHANGE UP (ref 0–0)
PH UR: 6 — SIGNIFICANT CHANGE UP (ref 5–8)
PHOSPHATE SERPL-MCNC: 4.4 MG/DL — SIGNIFICANT CHANGE UP (ref 2.5–4.5)
PHOSPHATE SERPL-MCNC: 5.4 MG/DL — HIGH (ref 2.5–4.5)
PLATELET # BLD AUTO: 129 K/UL — LOW (ref 150–400)
POTASSIUM SERPL-MCNC: 4.4 MMOL/L — SIGNIFICANT CHANGE UP (ref 3.5–5.3)
POTASSIUM SERPL-MCNC: 5 MMOL/L — SIGNIFICANT CHANGE UP (ref 3.5–5.3)
POTASSIUM SERPL-SCNC: 4.4 MMOL/L — SIGNIFICANT CHANGE UP (ref 3.5–5.3)
POTASSIUM SERPL-SCNC: 5 MMOL/L — SIGNIFICANT CHANGE UP (ref 3.5–5.3)
PROT SERPL-MCNC: 6.2 G/DL — SIGNIFICANT CHANGE UP (ref 6–8.3)
PROT UR-MCNC: ABNORMAL
PROTHROM AB SERPL-ACNC: 17.9 SEC — HIGH (ref 10.6–13.6)
RBC # BLD: 2.94 M/UL — LOW (ref 3.8–5.2)
RBC # FLD: 21.8 % — HIGH (ref 10.3–14.5)
RBC CASTS # UR COMP ASSIST: 209 /HPF — HIGH (ref 0–4)
SODIUM SERPL-SCNC: 131 MMOL/L — LOW (ref 135–145)
SODIUM SERPL-SCNC: 134 MMOL/L — LOW (ref 135–145)
SP GR SPEC: 1.01 — SIGNIFICANT CHANGE UP (ref 1.01–1.02)
SPECIMEN SOURCE: SIGNIFICANT CHANGE UP
TRIGL SERPL-MCNC: 394 MG/DL — HIGH
UROBILINOGEN FLD QL: SIGNIFICANT CHANGE UP
WBC # BLD: 10.81 K/UL — HIGH (ref 3.8–10.5)
WBC # FLD AUTO: 10.81 K/UL — HIGH (ref 3.8–10.5)
WBC UR QL: 102 /HPF — HIGH (ref 0–5)

## 2020-12-09 PROCEDURE — 99233 SBSQ HOSP IP/OBS HIGH 50: CPT | Mod: GC

## 2020-12-09 PROCEDURE — 99291 CRITICAL CARE FIRST HOUR: CPT

## 2020-12-09 PROCEDURE — 99232 SBSQ HOSP IP/OBS MODERATE 35: CPT

## 2020-12-09 RX ORDER — BUMETANIDE 0.25 MG/ML
2 INJECTION INTRAMUSCULAR; INTRAVENOUS
Qty: 20 | Refills: 0 | Status: DISCONTINUED | OUTPATIENT
Start: 2020-12-09 | End: 2020-12-10

## 2020-12-09 RX ORDER — BUMETANIDE 0.25 MG/ML
4 INJECTION INTRAMUSCULAR; INTRAVENOUS EVERY 8 HOURS
Refills: 0 | Status: DISCONTINUED | OUTPATIENT
Start: 2020-12-09 | End: 2020-12-09

## 2020-12-09 RX ORDER — MIDODRINE HYDROCHLORIDE 2.5 MG/1
302 TABLET ORAL EVERY 8 HOURS
Refills: 0 | Status: DISCONTINUED | OUTPATIENT
Start: 2020-12-09 | End: 2020-12-09

## 2020-12-09 RX ORDER — MIDODRINE HYDROCHLORIDE 2.5 MG/1
30 TABLET ORAL EVERY 8 HOURS
Refills: 0 | Status: ACTIVE | OUTPATIENT
Start: 2020-12-09 | End: 2021-11-07

## 2020-12-09 RX ORDER — MIDODRINE HYDROCHLORIDE 2.5 MG/1
10 TABLET ORAL ONCE
Refills: 0 | Status: COMPLETED | OUTPATIENT
Start: 2020-12-09 | End: 2020-12-09

## 2020-12-09 RX ADMIN — Medication 2 PUFF(S): at 05:05

## 2020-12-09 RX ADMIN — Medication 1: at 12:21

## 2020-12-09 RX ADMIN — HUMAN INSULIN 10 UNIT(S): 100 INJECTION, SUSPENSION SUBCUTANEOUS at 05:53

## 2020-12-09 RX ADMIN — Medication 2: at 05:53

## 2020-12-09 RX ADMIN — Medication 1: at 23:19

## 2020-12-09 RX ADMIN — Medication 1: at 18:25

## 2020-12-09 RX ADMIN — Medication 2 PUFF(S): at 00:09

## 2020-12-09 RX ADMIN — Medication 1 APPLICATION(S): at 05:54

## 2020-12-09 RX ADMIN — NYSTATIN CREAM 1 APPLICATION(S): 100000 CREAM TOPICAL at 18:09

## 2020-12-09 RX ADMIN — Medication 1 APPLICATION(S): at 18:08

## 2020-12-09 RX ADMIN — MIDODRINE HYDROCHLORIDE 10 MILLIGRAM(S): 2.5 TABLET ORAL at 11:03

## 2020-12-09 RX ADMIN — Medication 2 PUFF(S): at 12:12

## 2020-12-09 RX ADMIN — HUMAN INSULIN 10 UNIT(S): 100 INJECTION, SUSPENSION SUBCUTANEOUS at 12:21

## 2020-12-09 RX ADMIN — BUMETANIDE 10 MG/HR: 0.25 INJECTION INTRAMUSCULAR; INTRAVENOUS at 20:35

## 2020-12-09 RX ADMIN — ALBUTEROL 4 PUFF(S): 90 AEROSOL, METERED ORAL at 12:12

## 2020-12-09 RX ADMIN — CHLORHEXIDINE GLUCONATE 15 MILLILITER(S): 213 SOLUTION TOPICAL at 18:08

## 2020-12-09 RX ADMIN — CHLORHEXIDINE GLUCONATE 15 MILLILITER(S): 213 SOLUTION TOPICAL at 05:55

## 2020-12-09 RX ADMIN — CHLORHEXIDINE GLUCONATE 1 APPLICATION(S): 213 SOLUTION TOPICAL at 12:21

## 2020-12-09 RX ADMIN — Medication 1 APPLICATION(S): at 05:56

## 2020-12-09 RX ADMIN — NYSTATIN CREAM 1 APPLICATION(S): 100000 CREAM TOPICAL at 05:55

## 2020-12-09 RX ADMIN — MIDODRINE HYDROCHLORIDE 20 MILLIGRAM(S): 2.5 TABLET ORAL at 05:56

## 2020-12-09 RX ADMIN — ALBUTEROL 4 PUFF(S): 90 AEROSOL, METERED ORAL at 00:10

## 2020-12-09 RX ADMIN — ALBUTEROL 4 PUFF(S): 90 AEROSOL, METERED ORAL at 05:05

## 2020-12-09 RX ADMIN — ALBUTEROL 4 PUFF(S): 90 AEROSOL, METERED ORAL at 17:51

## 2020-12-09 RX ADMIN — BUMETANIDE 132 MILLIGRAM(S): 0.25 INJECTION INTRAMUSCULAR; INTRAVENOUS at 14:18

## 2020-12-09 RX ADMIN — MIDODRINE HYDROCHLORIDE 30 MILLIGRAM(S): 2.5 TABLET ORAL at 15:15

## 2020-12-09 RX ADMIN — MIDODRINE HYDROCHLORIDE 30 MILLIGRAM(S): 2.5 TABLET ORAL at 21:20

## 2020-12-09 RX ADMIN — HEPARIN SODIUM 13 UNIT(S)/HR: 5000 INJECTION INTRAVENOUS; SUBCUTANEOUS at 20:20

## 2020-12-09 RX ADMIN — PANTOPRAZOLE SODIUM 40 MILLIGRAM(S): 20 TABLET, DELAYED RELEASE ORAL at 12:22

## 2020-12-09 RX ADMIN — DEXMEDETOMIDINE HYDROCHLORIDE IN 0.9% SODIUM CHLORIDE 6.81 MICROGRAM(S)/KG/HR: 4 INJECTION INTRAVENOUS at 20:19

## 2020-12-09 RX ADMIN — CHLORHEXIDINE GLUCONATE 1 APPLICATION(S): 213 SOLUTION TOPICAL at 05:55

## 2020-12-09 RX ADMIN — Medication 2 PUFF(S): at 17:51

## 2020-12-09 RX ADMIN — HUMAN INSULIN 10 UNIT(S): 100 INJECTION, SUSPENSION SUBCUTANEOUS at 23:21

## 2020-12-09 RX ADMIN — HUMAN INSULIN 10 UNIT(S): 100 INJECTION, SUSPENSION SUBCUTANEOUS at 18:25

## 2020-12-09 RX ADMIN — Medication 100 MILLIGRAM(S): at 22:12

## 2020-12-09 NOTE — PROGRESS NOTE ADULT - ATTENDING COMMENTS
Off CRRT, remains intubated  1.  ARF--modest response to IV diuretic (can try continuous v bolus, add metolazone).  Overall clearances adequate and if 2 required PUF would likely suffice (or SCUF if hypotensive)  2.  Respiratory failure, acute-- modest UF, keep FIO2<<0.6  3.  Hypotension--needed pressor for 12/8 PUF.  For tomorrow if no diuretic response, would titrate pressor to achieve UF goal.

## 2020-12-09 NOTE — PROGRESS NOTE ADULT - ASSESSMENT
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 - used symbicort intermittently), AARON, peritonitis s/p Oral's procedure and ileostomy (reversed 2011) admitted with COVID19    Developed MSSA Bacteremia  Sputum culture with MSSA (likely PNA as initial source)  BCx also with Proteus (Cefazolin susceptible) - ?Urinary Source  Will need line replacement as patient was actively bacteremia at time of currently line placement  Patient also with Stenotrophomonas in sputum on 12/1    CT C/A/P (12/4) with New large consolidation with areas of cavitation in the right lung  Admission CTA Chest without cavitation so I suspect this finding is secondary to MSSA Pneumonia  Reassuringly no abscess in CT Chest or CT A/P  Would continue Cefazolin at this time    RECOMMENDATIONS:    #Abnormal U/A - doubt UTI - patient otherwise clinically improving - wouldn't escalate antibiotics at this point  --Followup on urine culture    #Positive Sputum Culture (Stenotrophomonas)  --Stop Levofloxacin    #MSSA bacteremia, Leukocytosis (worsening), Cavitary Pneumonia   --Continue Cefazolin to 2g IV Q24H if not restarting CRRT (2g IV Q12H if restarting CRRT). If patient consistently requires HD would decrease Cefazolin to 1g IV Q24H (dosed in the PM to be after HD)  --Continue to follow CBC with diff  --Continue to follow renal function (Cr/BUN)  --Continue to follow temperature curve  --Follow up on preliminary blood cultures     #P. mirablis bacteremia  --s/p course of antibiotics    #COVID-19, Hypoxic Respiratory Failure (persistent)  - please hold further Dexamethasone (in setting of active bacteremia)  - Continue supplemental O2 and supportive care per primary team  - Continue Contact and Airborne Isolation precautions    #Antibiotic allergy  - uncertain reaction - tolerated Ceftriaxone, Cefepime and Cefazolin in this admission  - outpatient  follow-up with Allergy/Immunology    I will continue to follow. Please feel free to contact me with any further questions.    Femi Adkins M.D.  [facility] Division of Infectious Disease  8AM-5PM: Pager Number [phone]  After Hours (or if no response): Please contact the Infectious Diseases Office at [phone]     The above assessment and plan were discussed with Dr Louie

## 2020-12-09 NOTE — PROGRESS NOTE ADULT - SUBJECTIVE AND OBJECTIVE BOX
Follow Up:  COVID19, MSSA Bacteremia, Proteus Bacteremia, Stenotrophomonas Infection     Interval History:    REVIEW OF SYSTEMS  [  ] ROS unobtainable because:    [  ] All other systems negative except as noted below:     Constitutional:  [ ] fever [ ] chills  [ ] weight loss  [ ] weakness  Skin:  [ ] rash [ ] phlebitis	  Eyes: [ ] icterus [ ] pain  [ ] discharge	  ENMT: [ ] sore throat  [ ] thrush [ ] ulcers [ ] exudates  Respiratory: [  ] dyspnea [ ] hemoptysis [ ] cough [ ] sputum	  Cardiovascular:  [ ] chest pain [ ] palpitations [ ] edema	  Gastrointestinal:  [ ] nausea [ ] vomiting [ ] diarrhea [ ] constipation [ ] pain	  Genitourinary:  [ ] dysuria [ ] frequency [ ] hematuria [ ] discharge [ ] flank pain  [ ] incontinence  Musculoskeletal:  [ ] myalgias [ ] arthralgias [ ] arthritis  [ ] back pain  Neurological:  [ ] headache [ ] seizures  [ ] confusion/altered mental status    Allergies  Kiwi (Unknown)  latex (Anaphylaxis)  latex (Unknown)  penicillin (Other)  penicillin (Unknown)  perfume  hives (Other)  potassium acetate (Other)  soap additives hives (Other)        ANTIMICROBIALS:  ceFAZolin   IVPB 2000 every 24 hours  levoFLOXacin IVPB 500 every 48 hours      OTHER MEDS:  MEDICATIONS  (STANDING):  acetaminophen    Suspension .. 650 every 6 hours PRN  ALBUTerol    90 MICROgram(s) HFA Inhaler 4 every 6 hours  buMETAnide IVPB 4 every 8 hours  dexMEDEtomidine Infusion 0.2 <Continuous>  dextrose 40% Gel 15 once  dextrose 50% Injectable 25 once  dextrose 50% Injectable 12.5 once  dextrose 50% Injectable 25 once  glucagon  Injectable 1 once  heparin  Infusion 1300 <Continuous>  insulin lispro (ADMELOG) corrective regimen sliding scale  every 6 hours  insulin NPH human recombinant 10 every 6 hours  ipratropium 17 MICROgram(s) HFA Inhaler 2 every 6 hours  lactulose Syrup 15 two times a day  midodrine 30 every 8 hours  norepinephrine Infusion 0.02 <Continuous>  pantoprazole  Injectable 40 daily  polyethylene glycol 3350 17 every 12 hours      Vital Signs Last 24 Hrs  T(C): 36.8 (09 Dec 2020 12:00), Max: 37.1 (09 Dec 2020 00:00)  T(F): 98.2 (09 Dec 2020 12:00), Max: 98.8 (09 Dec 2020 00:00)  HR: 113 (09 Dec 2020 12:16) (72 - 113)  BP: 142/62 (09 Dec 2020 12:00) (77/57 - 177/84)  BP(mean): 89 (09 Dec 2020 12:00) (59 - 115)  RR: 37 (09 Dec 2020 12:00) (10 - 98)  SpO2: 92% (09 Dec 2020 12:16) (90% - 100%)    PHYSICAL EXAMINATION:    General: Alert and Awake, NAD  HEENT: PERRL, EOMI  Neck: Supple  Cardiac: RRR, No M/R/G  Resp: CTAB, No Wh/Rh/Ra  Abdomen: NBS, NT/ND, No HSM, No rigidity or guarding  MSK: No LE edema. No Calf tenderness  : No negro  Skin: No rashes or lesions. Skin is warm and dry to the touch.   Neuro: Alert and Awake. CN 2-12 Grossly intact. Moves all four extremities spontaneously.  Psych: Calm, Pleasant, Cooperative    LABORATORY:                          8.6    10.81 )-----------( 129      ( 09 Dec 2020 02:07 )             27.4       12-    131<L>  |  97  |  40<H>  ----------------------------<  220<H>  5.0   |  18<L>  |  2.57<H>    Ca    9.0      09 Dec 2020 02:07  Phos  4.4     12-  Mg     2.2     -    TPro  6.2  /  Alb  2.5<L>  /  TBili  0.7  /  DBili  x   /  AST  43<H>  /  ALT  <5<L>  /  AlkPhos  90  12-      Urinalysis Basic - ( 08 Dec 2020 18:04 )    Color: Yellow / Appearance: Turbid / S.013 / pH: x  Gluc: x / Ketone: Negative  / Bili: Negative / Urobili: <2 mg/dL   Blood: x / Protein: 30 mg/dL / Nitrite: Negative   Leuk Esterase: Large / RBC: 209 /HPF /  /HPF   Sq Epi: x / Non Sq Epi: 6 /HPF / Bacteria: Negative        C-Reactive Protein, Serum: 10.28 mg/dL (20 @ 02:07)  C-Reactive Protein, Serum: 9.72 mg/dL (20 @ 00:13)  C-Reactive Protein, Serum: 10.71 mg/dL (20 @ 00:38)      Ferritin, Serum: 995 ng/mL (20 @ 08:16)  Ferritin, Serum: 1082 ng/mL (20 @ 03:18)  Ferritin, Serum: 1016 ng/mL (20 @ 07:03)      D-Dimer Assay, Quantitative: 2273 ng/mL DDU (20 @ 02:07)  D-Dimer Assay, Quantitative: 4974 ng/mL DDU (20 @ 00:13)  D-Dimer Assay, Quantitative: 3097 ng/mL DDU (20 @ 00:38)          MICROBIOLOGY:  v  .Blood Blood-Peripheral  20   No growth to date.  --  --      .Sputum Sputum  20   Moderate Stenotrophomonas maltophilia  Normal Respiratory Jocelynn present  --  Stenotrophomonas maltophilia      .Blood Blood-Peripheral  20   No Growth Final  --  --      .Urine Clean Catch (Midstream)  20   <10,000 CFU/mL Normal Urogenital Jocelynn  --  --      .Blood Blood-Peripheral  20   Growth in aerobic and anaerobic bottles: Staphylococcus aureus See  previous culture [id number]  --    Growth in aerobic bottle: Gram Positive Cocci in Clusters  Growth in anaerobic bottle: Gram Positive Cocci in Clusters      .Blood Blood-Peripheral  20   Growth in aerobic and anaerobic bottles: Staphylococcus aureus  Growth in anaerobic bottle: Staphylococcus hominis  Coag Negative Staphylococcus  Single set isolate, possible contaminant. Contact  Microbiology if susceptibility testing clinically  indicated.  ***Blood Panel PCR results on this specimen are available  approximately 3 hours after the Gram stain result.***  Gram stain, PCR, and/or culture results may not always  correspond due to difference in methodologies.  ************************************************************  This PCR assay was performed using [employer].  The following targets are tested for: Enterococcus,  vancomycin resistant enterococci, Listeria monocytogenes,  coagulase negative staphylococci, S. aureus,  methicillin resistant S. aureus, Streptococcus agalactiae  (Group B), S. pneumoniae, S. pyogenes (Group A),  Acinetobacter baumannii, Enterobacter cloacae, E. coli,  Klebsiella oxytoca, K. pneumoniae, Proteus sp.,  Serratia marcescens, Haemophilus influenzae,  Neisseria meningitidis, Pseudomonas aeruginosa, Candida  albicans, C. glabrata, C krusei, C parapsilosis,  C. tropicalis and the KPC resistance gene.  --  Blood Culture PCR  Staphylococcus aureus      .Sputum  20   Numerous Staphylococcus aureus  Normal Respiratory Jocelynn absent  --  Staphylococcus aureus      .Sputum Sputum  20   Moderate Staphylococcus aureus  Normal Respiratory Jocelynn absent  --  Staphylococcus aureus      .Blood Blood-Peripheral  20   No Growth Final  --  --                RADIOLOGY:    <The imaging below has been reviewed and visualized by me independently. Findings as detailed in report below> Follow Up:  COVID19, MSSA Bacteremia, Proteus Bacteremia, Stenotrophomonas Infection     Interval History: Afebrile. making urine. no acute overnight events.     REVIEW OF SYSTEMS  [  x] ROS unobtainable because:  intubated and sedated  [  ] All other systems negative except as noted below:     Constitutional:  [ ] fever [ ] chills  [ ] weight loss  [ ] weakness  Skin:  [ ] rash [ ] phlebitis	  Eyes: [ ] icterus [ ] pain  [ ] discharge	  ENMT: [ ] sore throat  [ ] thrush [ ] ulcers [ ] exudates  Respiratory: [  ] dyspnea [ ] hemoptysis [ ] cough [ ] sputum	  Cardiovascular:  [ ] chest pain [ ] palpitations [ ] edema	  Gastrointestinal:  [ ] nausea [ ] vomiting [ ] diarrhea [ ] constipation [ ] pain	  Genitourinary:  [ ] dysuria [ ] frequency [ ] hematuria [ ] discharge [ ] flank pain  [ ] incontinence  Musculoskeletal:  [ ] myalgias [ ] arthralgias [ ] arthritis  [ ] back pain  Neurological:  [ ] headache [ ] seizures  [ ] confusion/altered mental status    Allergies  Kiwi (Unknown)  latex (Anaphylaxis)  latex (Unknown)  penicillin (Other)  penicillin (Unknown)  perfume  hives (Other)  potassium acetate (Other)  soap additives hives (Other)        ANTIMICROBIALS:  ceFAZolin   IVPB 2000 every 24 hours  levoFLOXacin IVPB 500 every 48 hours      OTHER MEDS:  MEDICATIONS  (STANDING):  acetaminophen    Suspension .. 650 every 6 hours PRN  ALBUTerol    90 MICROgram(s) HFA Inhaler 4 every 6 hours  buMETAnide IVPB 4 every 8 hours  dexMEDEtomidine Infusion 0.2 <Continuous>  dextrose 40% Gel 15 once  dextrose 50% Injectable 25 once  dextrose 50% Injectable 12.5 once  dextrose 50% Injectable 25 once  glucagon  Injectable 1 once  heparin  Infusion 1300 <Continuous>  insulin lispro (ADMELOG) corrective regimen sliding scale  every 6 hours  insulin NPH human recombinant 10 every 6 hours  ipratropium 17 MICROgram(s) HFA Inhaler 2 every 6 hours  lactulose Syrup 15 two times a day  midodrine 30 every 8 hours  norepinephrine Infusion 0.02 <Continuous>  pantoprazole  Injectable 40 daily  polyethylene glycol 3350 17 every 12 hours      Vital Signs Last 24 Hrs  T(C): 36.8 (09 Dec 2020 12:00), Max: 37.1 (09 Dec 2020 00:00)  T(F): 98.2 (09 Dec 2020 12:00), Max: 98.8 (09 Dec 2020 00:00)  HR: 113 (09 Dec 2020 12:16) (72 - 113)  BP: 142/62 (09 Dec 2020 12:00) (77/57 - 177/84)  BP(mean): 89 (09 Dec 2020 12:00) (59 - 115)  RR: 37 (09 Dec 2020 12:00) (10 - 98)  SpO2: 92% (09 Dec 2020 12:16) (90% - 100%)    PHYSICAL EXAMINATION:  General: Intubated and Sedated  HEENT: +ETT  Neck: Supple  Cardiac: RRR, No M/R/G  Resp: CTAB, No Wh/Rh/Ra  Abdomen: NBS, NT/ND, No HSM, No rigidity or guarding  MSK: No LE edema. No Calf tenderness  : Rivera  Skin: No rashes or lesions. Skin is warm and dry to the touch.   Vascular: RIJ and LIJ (No surrounding erythema, drainage or tenderness to palpation)  Neuro: Intubated and Sedated  Psych: Unable to assess - intubated and sedated    LABORATORY:                          8.6    10.81 )-----------( 129      ( 09 Dec 2020 02:07 )             27.4       12-09    131<L>  |  97  |  40<H>  ----------------------------<  220<H>  5.0   |  18<L>  |  2.57<H>    Ca    9.0      09 Dec 2020 02:07  Phos  4.4     12-09  Mg     2.2     12-09    TPro  6.2  /  Alb  2.5<L>  /  TBili  0.7  /  DBili  x   /  AST  43<H>  /  ALT  <5<L>  /  AlkPhos  90  12-09      Urinalysis Basic - ( 08 Dec 2020 18:04 )    Color: Yellow / Appearance: Turbid / S.013 / pH: x  Gluc: x / Ketone: Negative  / Bili: Negative / Urobili: <2 mg/dL   Blood: x / Protein: 30 mg/dL / Nitrite: Negative   Leuk Esterase: Large / RBC: 209 /HPF /  /HPF   Sq Epi: x / Non Sq Epi: 6 /HPF / Bacteria: Negative    C-Reactive Protein, Serum: 10.28 mg/dL (20 @ 02:07)  C-Reactive Protein, Serum: 9.72 mg/dL (20 @ 00:13)  C-Reactive Protein, Serum: 10.71 mg/dL (20 @ 00:38)    Ferritin, Serum: 995 ng/mL (20 @ 08:16)  Ferritin, Serum: 1082 ng/mL (20 @ 03:18)  Ferritin, Serum: 1016 ng/mL (20 @ 07:03)    D-Dimer Assay, Quantitative: 2273 ng/mL DDU (20 @ 02:07)  D-Dimer Assay, Quantitative: 4974 ng/mL DDU (20 @ 00:13)  D-Dimer Assay, Quantitative: 3097 ng/mL DDU (20 @ 00:38)    MICROBIOLOGY:    .Blood Blood-Peripheral  20   No growth to date.  --  --      .Sputum Sputum  20   Moderate Stenotrophomonas maltophilia  Normal Respiratory Jocelynn present  --  Stenotrophomonas maltophilia      .Blood Blood-Peripheral  20   No Growth Final  --  --      .Urine Clean Catch (Midstream)  20   <10,000 CFU/mL Normal Urogenital Jocelynn  --  --      .Blood Blood-Peripheral  20   Growth in aerobic and anaerobic bottles: Staphylococcus aureus See  previous culture [id number]  --    Growth in aerobic bottle: Gram Positive Cocci in Clusters  Growth in anaerobic bottle: Gram Positive Cocci in Clusters      .Blood Blood-Peripheral  20   Growth in aerobic and anaerobic bottles: Staphylococcus aureus  Growth in anaerobic bottle: Staphylococcus hominis  Coag Negative Staphylococcus  Single set isolate, possible contaminant. Contact  Microbiology if susceptibility testing clinically  indicated.  ***Blood Panel PCR results on this specimen are available  approximately 3 hours after the Gram stain result.***  Gram stain, PCR, and/or culture results may not always  correspond due to difference in methodologies.  ************************************************************  This PCR assay was performed using [employer].  The following targets are tested for: Enterococcus,  vancomycin resistant enterococci, Listeria monocytogenes,  coagulase negative staphylococci, S. aureus,  methicillin resistant S. aureus, Streptococcus agalactiae  (Group B), S. pneumoniae, S. pyogenes (Group A),  Acinetobacter baumannii, Enterobacter cloacae, E. coli,  Klebsiella oxytoca, K. pneumoniae, Proteus sp.,  Serratia marcescens, Haemophilus influenzae,  Neisseria meningitidis, Pseudomonas aeruginosa, Candida  albicans, C. glabrata, C krusei, C parapsilosis,  C. tropicalis and the KPC resistance gene.  --  Blood Culture PCR  Staphylococcus aureus      .Sputum  20   Numerous Staphylococcus aureus  Normal Respiratory Jocelynn absent  --  Staphylococcus aureus      .Sputum Sputum  20   Moderate Staphylococcus aureus  Normal Respiratory Jocelynn absent  --  Staphylococcus aureus      .Blood Blood-Peripheral  20   No Growth Final  --  -    RADIOLOGY:    <The imaging below has been reviewed and visualized by me independently. Findings as detailed in report below>    EXAM:  XR CHEST PORTABLE URGENT 1V                        PROCEDURE DATE:  2020    Diffuse airspace opacities are seen throughout both lungs compatible with Covid 19 pneumonia. No change when compared to previous study done 2020 at 8:17 PM. Endotracheal tube is just over the yuniel and should be withdrawn slightly. NG tube is in the stomach. No pleural effusion. No pneumothorax

## 2020-12-09 NOTE — PROGRESS NOTE ADULT - ASSESSMENT
Pt is a 59 y/o F w PMH of AARON, HTN, HLD, prior DVT/PE hx, asthma presented to [facility] for SOB and productive cough prior to admission. Admitted for acute hypoxic respiratory failure 2/2 to COVID infection. Was subsequently intubated and proned on 11/23//20. Was transferred to ICU for further management. Was started on pressors and nimbex drip. Nephrology consulted for ARTEMIO    #ARTEMIO  Pt with ARTEMIO in the setting of septic shock 2/2 to COVID infection. Now likely in ATN. Noted to have Scr of 0.53 on 11/23/20, then Scr further increased to 1.64 on 11/24/20, 2.18 on 11/25/20 and then further to 2.57 on 11/26/20 and 3.0 on 11/27. Patient was started on CRRT 11/27, held on 12/6 for diuretic challenge. Received HD on 12/8 with 1.3L removed. Oliguric though improving UOP, 550cc in last 24hrs. Recommend to c/w diuretic at this time, bumex gtt stopped on 12/8, recommend 4mg BID of bumex, can use metolazone 5mg prior to giving bumex. Will discuss with MICU team regarding HD today to supplement UOP while providing clearance, would need to increase IV pressor dose to facilitate and avoid further episodes of hypotension and ATN which would hamper renal recovery. Monitor labs and urine output. Avoid NSAIDs, ACEI/ARBS, RCA and nephrotoxins. Dose medications as per eGFR<10

## 2020-12-09 NOTE — PROGRESS NOTE ADULT - ASSESSMENT
======================= DISPOSITION  =====================  [X ] Critical   [ ] Guarded    [ ] Stable    [X ] Maintain in ICU  [ ] Downgrade to Medicine   [ ] Discharge Home   A/P: 60F Hx AARON, morbid obesity, HTN, DVT/PE p/w acute hypoxic respiratory failure in setting of ARDS/Covid-19 PNA s/p intubation.  Course c/b MSSA septic shock requiring pressors, ATN on CRRT.    Neuro  - Restart Nimbex given poor vent compliance, bolus now  - Sedated: Ketamine 1.5, Versed 0.04 - titrate as needed   - Neuro checks per protocol    Pulm  #Hypoxic respiratory failure in setting of ARDS/Covid-19 PNA  - Intubated PC: PIP 40, RR 30, PEEP 16, Fi02 50% AB.39/41/100/24 p/f: 200  - Paralyze and prone now - vent management per ABGs, pulm toileting   - Would consider attempting to increase CRRT removal to 200/hr     CV  #Hypotension likely in setting of vasodilation vs. sedation   - Remains on Levophed, maintain MAP >65, wean as tolerates  - Increase Midodrine to 20 TID     GI  - Tolerating trickle TF: Nephro - will advance to goal today   - Avoid checking residuals unless it clinically warrants   - c/w bowel regimen, GI ppx: PPI    Renal  #ATN requiring CRRT ()  - c/w CVV currently -100ml/hr, strict I/Os, keep negative   - Consider attempting to increase CRRT removal to -200ml/hr   - Failed Bumex 4mg IVPB challenge yesterday    - Renally dose meds, avoid nephrotoxins     Heme  #Hypercoagulable in setting of Covid-19, Hx DVT/PE  - Off NOAC, c/w systemic Heparin, PTT therapeutic  - Chest CTA (): no PE, +GGO b/l, check VA Duplex BLE    Endo  #T2DM, A1c 7.5  - -170, c/w NPH 10u q6h, ISS moderate     ID  #Covid-19 PNA  - S/P Remdesivir and Dexamethasone course   - Inflammatory markers q72h     #MSSA/P. mirablis Bacteremia   - S/P Meropenem (-), Ceftriaxone (-)  - c/w Cefazolin 2mg IV QD - BCx (): NGTD  - c/w Caspofungin for now, F/U fungel work up sent ()  - Obtain CT Chest/Abd/Pelvic w/ IV contrast once stable   - Obtain YANG if pan CT negative to r/o endocarditis   - Trend CBC w/ diff, lactate, procal, temp curve    #Sputum +Stenotrophomonas   - c/w Levaquin 500mg IV daily - F/U susceptibilities    ======================= DISPOSITION  =====================  [X ] Critical   [ ] Guarded    [ ] Stable    [X ] Maintain in ICU  [ ] Downgrade to Medicine   [ ] Discharge Home      Christine Nolan [facility]  ICU [phone]   A/P: 60F Hx AARON, morbid obesity, HTN, DVT/PE p/w acute hypoxic respiratory failure in setting of ARDS/Covid-19 PNA s/p intubation.  Course c/b MSSA septic shock requiring pressors, ATN with failed diuretic challenge requiring CRRT now on iHD on pressors.     Neuro  - Remains off paralytic, sedated on Precedex 0.5  - Attempt to wean Precedex today   - Neuro checks per protocol    Pulm  #Hypoxic respiratory failure in setting of ARDS/Covid-19 PNA vs. fluid overload   - Intubated AC: 350/30/8/40 AB.38/38/76/22 p/f: 190 peak: 31 plateau: 33  - Vent management per ABGs, pulm toileting - trial PS  trial today  - Hold iHD today, attempt diuretic challenge again     CV  #Hypotension likely in setting of vasodilation vs. sedation   - Remains on Levophed, maintain MAP >65, wean as tolerates  - Increase Midodrine to 30 TID     GI  - Tolerating TF: Nephro at goal 30ml/hr    - c/w bowel regimen, GI ppx: PPI    Renal  #ATN requiring CRRT now on iHD  - S/P CRRT (-), failed Bumex challenge ()  - Started iHD () s/p -2L removal, required pressors   - Hold iHD today - Bumex/Zaroxolyn challenge today   - Renally dose meds, avoid nephrotoxins   - Trend BMP/lytes q12h     Heme  #Hypercoagulable in setting of Covid-19, Hx DVT/PE  - c/w systemic Heparin per protocol, PTT therapeutic  - Chest CTA (): no PE, +GGO b/l    Endo  #T2DM, A1c 7.5  - -126, increase NPH to 12u q6h, ISS moderate     ID  #Covid-19 PNA  - S/P Remdesivir and Dexamethasone course   - Inflammatory markers q72h     #MSSA/P. mirablis Bacteremia   - S/P Meropenem (-), Ceftriaxone (-)  - c/w Cefazolin 2mg IV QD - BCx (, ): NGTD  - Will need to renally dose Cefazolin if c/w iHD in AM  - Pan CT (): R lung consolidation, multifocal PNA  - S/P TTE (): suboptimal, will consider YANG   - Trend CBC w/ diff, lactate, procal, temp curve    #Sputum +Stenotrophomonas   - c/w Levaquin 500mg IV daily - complete per ID recs     ======================= DISPOSITION  =====================  [X ] Critical   [ ] Guarded    [ ] Stable    [X ] Maintain in ICU  [ ] Downgrade to Medicine   [ ] Discharge Home      Christine Nolan [facility]  ICU [phone]   A/P: 60F Hx AARON, morbid obesity, HTN, DVT/PE p/w acute hypoxic respiratory failure in setting of ARDS/Covid-19 PNA s/p intubation.  Course c/b MSSA septic shock requiring pressors, ATN with failed diuretic challenge requiring CRRT now on iHD on pressors.     Neuro  - Remains off paralytic, sedated on Precedex 0.5  - Attempt to wean Precedex today   - Neuro checks per protocol    Pulm  #Hypoxic respiratory failure in setting of ARDS/Covid-19 PNA vs. fluid overload   - Intubated AC: 350/30/8/40 AB.38/38/76/22 p/f: 190 peak: 31 plateau: 33  - Vent management per ABGs, pulm toileting - trial PS  trial today  - Hold iHD today, attempt diuretic challenge again     CV  #Hypotension likely in setting of vasodilation vs. sedation   - Remains on Levophed, maintain MAP >65, wean as tolerates  - Increase Midodrine to 30 TID     GI  - Tolerating TF: Nephro at goal 30ml/hr    - c/w bowel regimen, GI ppx: PPI    Renal  #ATN requiring CRRT now on iHD  - S/P CRRT (-), failed Bumex challenge ()  - Started iHD () s/p -2L removal, required pressors   - Hold iHD today - Bumex/Zaroxolyn challenge today   - Renally dose meds, avoid nephrotoxins   - Trend BMP/lytes q12h     Heme  #Hypercoagulable in setting of Covid-19, Hx DVT/PE  - c/w systemic Heparin per protocol, PTT therapeutic  - Chest CTA (): no PE, +GGO b/l    Endo  #T2DM, A1c 7.5  - -126, increase NPH to 12u q6h, ISS moderate     ID  #Covid-19 PNA  - S/P Remdesivir and Dexamethasone course   - Inflammatory markers q72h     #MSSA/P. mirablis Bacteremia   - S/P Meropenem (-), Ceftriaxone (-)  - c/w Cefazolin 2mg IV QD - BCx (, ): NGTD  - Will need to renally dose Cefazolin if c/w iHD in AM  - Pan CT (): R lung consolidation, multifocal PNA  - S/P TTE (): suboptimal, will consider YANG   - Trend CBC w/ diff, lactate, procal, temp curve    #Sputum +Stenotrophomonas   - DC Levaquin, course completed    ======================= DISPOSITION  =====================  [X ] Critical   [ ] Guarded    [ ] Stable    [X ] Maintain in ICU  [ ] Downgrade to Medicine   [ ] Discharge Home      Christine Nolan [facility]  ICU [phone]

## 2020-12-09 NOTE — PROGRESS NOTE ADULT - ATTENDING COMMENTS
1. Acute hypoxemic respiratory with ARDS from SARS 2, Covid -19  pneumonia. Pt also with morbid obesity with BMI~58. Tolerating proning with good results. Currently  supined on AC 8 peep 40%. Trial of PS as tolerated.  2..Id. S. Aureus ,S. Hominus, and Proteus bacteremia. Continue Cefazolin. WBC  slowly decreasing. . Continue Levaquin for Stenotrophomonas.  Ct chest shows pulmonary abscess due to staph aureus. Continue Cefazolin.  3. Renal failure from ATN. Trial of Bumex and metolazone.

## 2020-12-09 NOTE — PROGRESS NOTE ADULT - SUBJECTIVE AND OBJECTIVE BOX
MIGUEL A AGUILA  MRN-[id number]  Patient is a 60y old  Female who presents with a chief complaint of Acute hypoxemic respiratory failure 2/2 covid19 (08 Dec 2020 17:16)    HPI: 60F Hx AARON, HTN, 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 Patient reports that she also had a general feeling of "uneasiness" that she could not explain for the last week. No chest pain. No pleuritic pain. No leg swelling or pain. Denies myalgias. Denies sick contacts. Reports that her  and son live with her and they have tested so far negative for covid and are asymptomatic, patient is not sure where was exposed. Reports trying her symbicort this week for shortness of breath without much relief. Patient yesterday began to have fevers with highest fever at 104F which alarmed her and for that reason she came to the hospital for help.  (2020 05:46)      ICU Vital Signs Last 24 Hrs  T(C): 36.8 (09 Dec 2020 04:00), Max: 37.1 (09 Dec 2020 00:00)  T(F): 98.2 (09 Dec 2020 04:00), Max: 98.8 (09 Dec 2020 00:00)  HR: 74 (09 Dec 2020 06:00) (74 - 100)  BP: 113/55 (09 Dec 2020 06:00) (77/57 - 177/84)  BP(mean): 79 (09 Dec 2020 06:00) (59 - 115)  RR: 17 (09 Dec 2020 06:00) (17 - 98)  SpO2: 96% (09 Dec 2020 06:00) (90% - 100%)    Mode: AC/ CMV (Assist Control/ Continuous Mandatory Ventilation), RR (machine): 30, TV (machine): 350, FiO2: 40, PEEP: 8, ITime: 0.62      I&O's Summary    07 Dec 2020 07:01  -  08 Dec 2020 07:00  --------------------------------------------------------  IN: 1424.8 mL / OUT: 405 mL / NET: 1019.8 mL    08 Dec 2020 07:  -  09 Dec 2020 06:35  --------------------------------------------------------  IN: 2366.4 mL / OUT: 2555 mL / NET: -188.6 mL      CAPILLARY BLOOD GLUCOSE  POCT Blood Glucose.: 219 mg/dL (09 Dec 2020 05:49)    PHYSICAL EXAM:      ============================I/O===========================   I&O's Detail    07 Dec 2020 07:  -  08 Dec 2020 07:00  --------------------------------------------------------  IN:    Bumetanide: 210 mL    Dexmedetomidine: 265.8 mL    Enteral Tube Flush: 50 mL    Heparin: 104 mL    IV PiggyBack: 100 mL    Nepro: 695 mL  Total IN: 1424.8 mL    OUT:    Indwelling Catheter - Urethral (mL): 405 mL  Total OUT: 405 mL    Total NET: 1019.8 mL      08 Dec 2020 07:  -  09 Dec 2020 06:35  --------------------------------------------------------  IN:    Bumetanide: 90 mL    Dexmedetomidine: 394.8 mL    Enteral Tube Flush: 250 mL    Heparin: 299 mL    Nepro: 570 mL    Norepinephrine: 62.6 mL    Other (mL): 700 mL  Total IN: 2366.4 mL    OUT:    Indwelling Catheter - Urethral (mL): 555 mL    Other (mL): 2000 mL  Total OUT: 2555 mL    Total NET: -188.6 mL    ============================ LABS =========================                        8.6    10.81 )-----------( 129      ( 09 Dec 2020 02:07 )             27.4         131<L>  |  97  |  40<H>  ----------------------------<  220<H>  5.0   |  18<L>  |  2.57<H>    Ca    9.0      09 Dec 2020 02:07  Phos  4.4       Mg     2.2         TPro  6.2  /  Alb  2.5<L>  /  TBili  0.7  /  DBili  x   /  AST  43<H>  /  ALT  <5<L>  /  AlkPhos  90      LIVER FUNCTIONS - ( 09 Dec 2020 02:07 )  Alb: 2.5 g/dL / Pro: 6.2 g/dL / ALK PHOS: 90 U/L / ALT: <5 U/L / AST: 43 U/L / GGT: x           PT/INR - ( 09 Dec 2020 02:07 )   PT: 17.9 sec;   INR: 1.52 ratio    PTT - ( 09 Dec 2020 02:07 )  PTT:101.5 sec  ABG - ( 09 Dec 2020 04:16 )  pH, Arterial: 7.38  pH, Blood: x     /  pCO2: 38    /  pO2: 76    / HCO3: 22    / Base Excess: -2.5  /  SaO2: 95        Blood Gas Arterial, Lactate: 1.3 mmol/L (20 @ 04:16)  Blood Gas Arterial, Lactate: 1.3 mmol/L (20 @ 00:00)  Blood Gas Arterial, Lactate: 0.9 mmol/L (20 @ 16:12)  Blood Gas Arterial, Lactate: 1.0 mmol/L (20 @ 00:37)    Urinalysis Basic - ( 08 Dec 2020 18:04 )    Color: Yellow / Appearance: Turbid / S.013 / pH: x  Gluc: x / Ketone: Negative  / Bili: Negative / Urobili: <2 mg/dL   Blood: x / Protein: 30 mg/dL / Nitrite: Negative   Leuk Esterase: Large / RBC: 209 /HPF /  /HPF   Sq Epi: x / Non Sq Epi: 6 /HPF / Bacteria: Negative    ======================Micro/Rad/Cardio=================  Telemetry: Reviewed   EKG: Reviewed  CXR: Reviewed  Culture: Reviewed   Echo: Limited Transthoracic Echo:   Patient name: MIGUEL A AGUILA  YOB: 1960   Age: 60 (F)   MR#: [id number]  Study Date: 2020  Location: [address] [place], [place] [id number]: Clotilde Chauhan RDCS  Study quality: Technically difficult due to p  Referring Physician: Andrew Lee MD  Blood Pressure: 126/60 mmHg  Height: 152 cm  Weight: 136 kg  BSA: 2.2 m2  ------------------------------------------------------------------------  PROCEDURE: Limited transthoracic echocardiogram with 2-D.  M-Mode and spectral and color flow Doppler.  INDICATION: Endocarditis, valve unspecified (I38)  ------------------------------------------------------------------------  Observations:  Mitral Valve: Normal mitral valve. Minimal mitral  regurgitation.  Aortic Valve/Aorta: Aortic valve not wellvisualized.  Left Ventricle: Endocardium not well visualized; unable to  evaluate left ventricular systolic function.  Right Heart: The right ventricle is not well visualized.  Tricuspid valve not well visualized. Pulmonic valve not  well visualized.  Pericardium/Pleura: Normal pericardium with no pericardial  effusion.  Hemodynamic: Estimated right atrial pressure is 8 mm Hg.  Estimated right ventricular systolic pressure equals 17 mm  Hg, assuming right atrial pressure equals 8 mm Hg,  consistent with normal pulmonary pressures.  ------------------------------------------------------------------------  Conclusions:  Technically difficult study with limited/suboptimal views.  Limited study to evaluate for endocarditis.  1. Endocardium not well visualized; unable to evaluate left  ventricular systolic function.  2. The right ventricle is not well visualized.  3. No obvious mobile echodensity or vegetation is seen on  the mitral valve. The aortic, tricuspid, and pulmonic  valves are not well visualized. Can not exclude  endocarditis. Consider YANG if clinically indicated.  ------------------------------------------------------------------------  Confirmed on  2020 - 14:55:03 by Stu Simmons M.D.  ------------------------------------------------------------------------ (20 @ 19:55)  Transthoracic Echocardiogram:   Patient name: MIGUEL A AGUILA  YOB: 1960   Age: 60 (F)   MR#: [id number]  Study Date: 2020  Location: [address] [place], [place] [id number]: Osiris Shine RDCS  Study quality: Technically difficult  Referring Physician: Melody Galicia MD  Blood Pressure: 139/54 mmHg  Height: 152 cm  Weight: 136 kg  BSA: 2.2 m2  ------------------------------------------------------------------------  PROCEDURE: Transthoracic echocardiogram with 2-D, M-Mode  and complete spectral and color flow Doppler.  INDICATION: Dyspnea, unspecified (R06.00)  ------------------------------------------------------------------------  Dimensions:    Normal Values:  LA:            2.0 - 4.0 cm  Ao:            2.0 - 3.8 cm  SEPTUM: 1.2    0.6 - 1.2 cm  PWT:    1.1    0.6 - 1.1 cm  LVIDd:  4.2    3.0 - 5.6 cm  LVIDs:  2.5    1.8 - 4.0 cm  Derived variables:  LVMI: 76 g/m2  RWT: 0.52  EF (Visual Estimate): 75 %  ------------------------------------------------------------------------  Observations:  Mitral Valve: Normal mitral valve.  Aortic Valve/Aorta: Normal appearing aoritc valve leaflets.  Normal aortic root.  Left Atrium: Normal left atrium.  Left Ventricle: Suboptimal endocardial resolution.  Normal left ventricular size. Mild concentric left  ventricular hypertrophy.  Normal left ventricular systolic function. Probably no  segmental wall motion abnormalities.  Right Heart: The right ventricle is not well visualized.  Hpwever, the normal ejection period (about  300 msec), as  measured via PW-Doppler through thepulmonic valve,  suggests normal RV systolic function.  Pericardium/Pleura: Epicardial fat pad noted.  Hemodynamic: No evidence of pulmoanry hypertension.  ------------------------------------------------------------------------  Conclusions:  Suboptimal endocardial resolution.  Normal left ventricular systolic function. Probably no  segmental wall motion abnormalities.      PAST MEDICAL & SURGICAL HISTORY:  Pneumomediastinum    Umbilical hernia  Reduciable    Osteoarthritis of both knees, unspecified osteoarthritis type    AARON (Obstructive Sleep Apnea)  category II pt uses c/pap pt to bering to hospital/  OR booking notified    Pneumonia      GERD (Gastroesophageal Reflux Disease)    Asthma  diagnosed   on adbvair denies attacks    DVT (Deep Venous Thrombosis)  left leg 6 m s/p knee replacement in     HTN (Hypertension)    H/O ileostomy  Ileostomy Revesal     S/P LEEP      S/P Exploratory Laparotomy    peritonitis  s/p right knee replacement/ colon resection and ileostomy    S/P Colonoscopy      S/P Endoscopy   gerd    S/P  Section      History of Tubal Ligation  s/p c/section     S/P Knee Surgery  2010 MIGUEL A AGUILA  MRN-[id number]  Patient is a 60y old  Female who presents with a chief complaint of Acute hypoxemic respiratory failure 2/2 covid19 (08 Dec 2020 17:16)    HPI: 60F w/pmhx AARON, HTN, 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 Patient reports that she also had a general feeling of "uneasiness" that she could not explain for the last week. No chest pain. No pleuritic pain. No leg swelling or pain. Denies myalgias. Denies sick contacts. Reports that her  and son live with her and they have tested so far negative for covid and are asymptomatic, patient is not sure where was exposed. Reports trying her symbicort this week for shortness of breath without much relief. Patient yesterday began to have fevers with highest fever at 104F which alarmed her and for that reason she came to the hospital for help.  (2020 05:46)    REVIEW OF SYSTEMS:  - Unable to obtain, intubated/sedated     ICU Vital Signs Last 24 Hrs  T(C): 36.8 (09 Dec 2020 04:00), Max: 37.1 (09 Dec 2020 00:00)  T(F): 98.2 (09 Dec 2020 04:00), Max: 98.8 (09 Dec 2020 00:00)  HR: 74 (09 Dec 2020 06:00) (74 - 100)  BP: 113/55 (09 Dec 2020 06:00) (77/57 - 177/84)  BP(mean): 79 (09 Dec 2020 06:00) (59 - 115)  RR: 17 (09 Dec 2020 06:00) (17 - 98)  SpO2: 96% (09 Dec 2020 06:00) (90% - 100%)    Mode: AC/ CMV (Assist Control/ Continuous Mandatory Ventilation), RR (machine): 30, TV (machine): 350, FiO2: 40, PEEP: 8, ITime: 0.62    I&O's Summary    07 Dec 2020 07:01  -  08 Dec 2020 07:00  --------------------------------------------------------  IN: 1424.8 mL / OUT: 405 mL / NET: 1019.8 mL    08 Dec 2020 07:  -  09 Dec 2020 06:35  --------------------------------------------------------  IN: 2366.4 mL / OUT: 2555 mL / NET: -188.6 mL      CAPILLARY BLOOD GLUCOSE  POCT Blood Glucose.: 219 mg/dL (09 Dec 2020 05:49)    PHYSICAL EXAM:  General: Critically ill, intubated  Neurology: Sedated RASS -4  Respiratory: Diffuse rhonchi, no wheezing   CV: RRR, S1S2, no murmurs, rubs or gallops  Abdominal: Soft, NT, ND, normoactive BS  Extremities: +2 pitting edema, warm, +dsital pulses  : Rivera intact/patent, hematuria w/o visible clots   ============================I/O===========================   I&O's Detail    07 Dec 2020 07:  -  08 Dec 2020 07:00  --------------------------------------------------------  IN:    Bumetanide: 210 mL    Dexmedetomidine: 265.8 mL    Enteral Tube Flush: 50 mL    Heparin: 104 mL    IV PiggyBack: 100 mL    Nepro: 695 mL  Total IN: 1424.8 mL    OUT:    Indwelling Catheter - Urethral (mL): 405 mL  Total OUT: 405 mL    Total NET: 1019.8 mL      08 Dec 2020 07:  -  09 Dec 2020 06:35  --------------------------------------------------------  IN:    Bumetanide: 90 mL    Dexmedetomidine: 394.8 mL    Enteral Tube Flush: 250 mL    Heparin: 299 mL    Nepro: 570 mL    Norepinephrine: 62.6 mL    Other (mL): 700 mL  Total IN: 2366.4 mL    OUT:    Indwelling Catheter - Urethral (mL): 555 mL    Other (mL): 2000 mL  Total OUT: 2555 mL    Total NET: -188.6 mL    ============================ LABS =========================                        8.6    10.81 )-----------( 129      ( 09 Dec 2020 02:07 )             27.4     12    131<L>  |  97  |  40<H>  ----------------------------<  220<H>  5.0   |  18<L>  |  2.57<H>    Ca    9.0      09 Dec 2020 02:07  Phos  4.4       Mg     2.2         TPro  6.2  /  Alb  2.5<L>  /  TBili  0.7  /  DBili  x   /  AST  43<H>  /  ALT  <5<L>  /  AlkPhos  90      LIVER FUNCTIONS - ( 09 Dec 2020 02:07 )  Alb: 2.5 g/dL / Pro: 6.2 g/dL / ALK PHOS: 90 U/L / ALT: <5 U/L / AST: 43 U/L / GGT: x           PT/INR - ( 09 Dec 2020 02:07 )   PT: 17.9 sec;   INR: 1.52 ratio    PTT - ( 09 Dec 2020 02:07 )  PTT:101.5 sec  ABG - ( 09 Dec 2020 04:16 )  pH, Arterial: 7.38  pH, Blood: x     /  pCO2: 38    /  pO2: 76    / HCO3: 22    / Base Excess: -2.5  /  SaO2: 95        Blood Gas Arterial, Lactate: 1.3 mmol/L (20 @ 04:16)  Blood Gas Arterial, Lactate: 1.3 mmol/L (20 @ 00:00)  Blood Gas Arterial, Lactate: 0.9 mmol/L (20 @ 16:12)  Blood Gas Arterial, Lactate: 1.0 mmol/L (20 @ 00:37)    Urinalysis Basic - ( 08 Dec 2020 18:04 )    Color: Yellow / Appearance: Turbid / S.013 / pH: x  Gluc: x / Ketone: Negative  / Bili: Negative / Urobili: <2 mg/dL   Blood: x / Protein: 30 mg/dL / Nitrite: Negative   Leuk Esterase: Large / RBC: 209 /HPF /  /HPF   Sq Epi: x / Non Sq Epi: 6 /HPF / Bacteria: Negative    ======================Micro/Rad/Cardio=================  Telemetry: Reviewed   EKG: Reviewed  CXR: Reviewed  Culture: Reviewed   Echo: Limited Transthoracic Echo:   Patient name: MIGUEL A AGUILA  YOB: 1960   Age: 60 (F)   MR#: [id number]  Study Date: 2020  Location: [address] [place], [place] [id number]: Clotilde Chauhan RDCS  Study quality: Technically difficult due to p  Referring Physician: Andrew Lee MD  Blood Pressure: 126/60 mmHg  Height: 152 cm  Weight: 136 kg  BSA: 2.2 m2  ------------------------------------------------------------------------  PROCEDURE: Limited transthoracic echocardiogram with 2-D.  M-Mode and spectral and color flow Doppler.  INDICATION: Endocarditis, valve unspecified (I38)  ------------------------------------------------------------------------  Observations:  Mitral Valve: Normal mitral valve. Minimal mitral  regurgitation.  Aortic Valve/Aorta: Aortic valve not wellvisualized.  Left Ventricle: Endocardium not well visualized; unable to  evaluate left ventricular systolic function.  Right Heart: The right ventricle is not well visualized.  Tricuspid valve not well visualized. Pulmonic valve not  well visualized.  Pericardium/Pleura: Normal pericardium with no pericardial  effusion.  Hemodynamic: Estimated right atrial pressure is 8 mm Hg.  Estimated right ventricular systolic pressure equals 17 mm  Hg, assuming right atrial pressure equals 8 mm Hg,  consistent with normal pulmonary pressures.  ------------------------------------------------------------------------  Conclusions:  Technically difficult study with limited/suboptimal views.  Limited study to evaluate for endocarditis.  1. Endocardium not well visualized; unable to evaluate left  ventricular systolic function.  2. The right ventricle is not well visualized.  3. No obvious mobile echodensity or vegetation is seen on  the mitral valve. The aortic, tricuspid, and pulmonic  valves are not well visualized. Can not exclude  endocarditis. Consider YANG if clinically indicated.  ------------------------------------------------------------------------  Confirmed on  2020 - 14:55:03 by Stu Simmons M.D.  ------------------------------------------------------------------------ (20 @ 19:55)  Transthoracic Echocardiogram:   Patient name: MIGUEL A AGUILA  YOB: 1960   Age: 60 (F)   MR#: [id number]  Study Date: 2020  Location: [address] [place], [place] [id number]: Osiris Shine RDCS  Study quality: Technically difficult  Referring Physician: Melody Galicia MD  Blood Pressure: 139/54 mmHg  Height: 152 cm  Weight: 136 kg  BSA: 2.2 m2  ------------------------------------------------------------------------  PROCEDURE: Transthoracic echocardiogram with 2-D, M-Mode  and complete spectral and color flow Doppler.  INDICATION: Dyspnea, unspecified (R06.00)  ------------------------------------------------------------------------  Dimensions:    Normal Values:  LA:            2.0 - 4.0 cm  Ao:            2.0 - 3.8 cm  SEPTUM: 1.2    0.6 - 1.2 cm  PWT:    1.1    0.6 - 1.1 cm  LVIDd:  4.2    3.0 - 5.6 cm  LVIDs:  2.5    1.8 - 4.0 cm  Derived variables:  LVMI: 76 g/m2  RWT: 0.52  EF (Visual Estimate): 75 %  ------------------------------------------------------------------------  Observations:  Mitral Valve: Normal mitral valve.  Aortic Valve/Aorta: Normal appearing aoritc valve leaflets.  Normal aortic root.  Left Atrium: Normal left atrium.  Left Ventricle: Suboptimal endocardial resolution.  Normal left ventricular size. Mild concentric left  ventricular hypertrophy.  Normal left ventricular systolic function. Probably no  segmental wall motion abnormalities.  Right Heart: The right ventricle is not well visualized.  Hpwever, the normal ejection period (about  300 msec), as  measured via PW-Doppler through thepulmonic valve,  suggests normal RV systolic function.  Pericardium/Pleura: Epicardial fat pad noted.  Hemodynamic: No evidence of pulmoanry hypertension.  ------------------------------------------------------------------------  Conclusions:  Suboptimal endocardial resolution.  Normal left ventricular systolic function. Probably no  segmental wall motion abnormalities.      PAST MEDICAL & SURGICAL HISTORY:  Pneumomediastinum    Umbilical hernia  Reduciable    Osteoarthritis of both knees, unspecified osteoarthritis type    AARON (Obstructive Sleep Apnea)  category II pt uses c/pap pt to bering to hospital/  OR booking notified    Pneumonia      GERD (Gastroesophageal Reflux Disease)    Asthma  diagnosed   on adbvair denies attacks    DVT (Deep Venous Thrombosis)  left leg 6 m s/p knee replacement in     HTN (Hypertension)    H/O ileostomy  Ileostomy Revesal     S/P LEEP      S/P Exploratory Laparotomy    peritonitis  s/p right knee replacement/ colon resection and ileostomy    S/P Colonoscopy      S/P Endoscopy   gerd    S/P  Section      History of Tubal Ligation  s/p c/section     S/P Knee Surgery  2010

## 2020-12-09 NOTE — PROGRESS NOTE ADULT - SUBJECTIVE AND OBJECTIVE BOX
[facility] DIVISION OF KIDNEY DISEASES AND HYPERTENSION -- FOLLOW UP NOTE  --------------------------------------------------------------------------------  Shashank Murillo   Nephrology Fellow  Pager NS: [phone]/ [facility]: [phone]  (After 5 pm or on weekends please page the on-call fellow)      Patient is a 60y old  Female who presents with a chief complaint of Acute hypoxemic respiratory failure 2/2 covid19 (09 Dec 2020 06:34)      24 hour events/subjective: Vital signs, labs, medications reviewed. Pt received HD yest, tolerated 1.3L removal. On low dose levophed gtt. Non-oliguric-> 550cc in 24hrs.        PAST HISTORY  --------------------------------------------------------------------------------  No significant changes to PMH, PSH, FHx, SHx, unless otherwise noted    ALLERGIES & MEDICATIONS  --------------------------------------------------------------------------------  Allergies    Kiwi (Unknown)  latex (Anaphylaxis)  latex (Unknown)  penicillin (Other)  penicillin (Unknown)  perfume  hives (Other)  potassium acetate (Other)  soap additives hives (Other)    Intolerances      Standing Inpatient Medications  ALBUTerol    90 MICROgram(s) HFA Inhaler 4 Puff(s) Inhalation every 6 hours  BACItracin   Ointment 1 Application(s) Topical two times a day  ceFAZolin   IVPB 2000 milliGRAM(s) IV Intermittent every 24 hours  chlorhexidine 0.12% Liquid 15 milliLiter(s) Oral Mucosa every 12 hours  chlorhexidine 4% Liquid 1 Application(s) Topical <User Schedule>  chlorhexidine 4% Liquid 1 Application(s) Topical <User Schedule>  dexMEDEtomidine Infusion 0.2 MICROgram(s)/kG/Hr IV Continuous <Continuous>  dextrose 40% Gel 15 Gram(s) Oral once  dextrose 5%. 1000 milliLiter(s) IV Continuous <Continuous>  dextrose 5%. 1000 milliLiter(s) IV Continuous <Continuous>  dextrose 50% Injectable 25 Gram(s) IV Push once  dextrose 50% Injectable 12.5 Gram(s) IV Push once  dextrose 50% Injectable 25 Gram(s) IV Push once  glucagon  Injectable 1 milliGRAM(s) IntraMuscular once  heparin  Infusion 1300 Unit(s)/Hr IV Continuous <Continuous>  insulin lispro (ADMELOG) corrective regimen sliding scale   SubCutaneous every 6 hours  insulin NPH human recombinant 10 Unit(s) SubCutaneous every 6 hours  ipratropium 17 MICROgram(s) HFA Inhaler 2 Puff(s) Inhalation every 6 hours  lactulose Syrup 15 Gram(s) Oral two times a day  levoFLOXacin IVPB 500 milliGRAM(s) IV Intermittent every 48 hours  midodrine 20 milliGRAM(s) Oral every 8 hours  norepinephrine Infusion 0.02 MICROgram(s)/kG/Min IV Continuous <Continuous>  nystatin Powder 1 Application(s) Topical two times a day  pantoprazole  Injectable 40 milliGRAM(s) IV Push daily  petrolatum Ophthalmic Ointment 1 Application(s) Both EYES two times a day  polyethylene glycol 3350 17 Gram(s) Oral every 12 hours    PRN Inpatient Medications  acetaminophen    Suspension .. 650 milliGRAM(s) Oral every 6 hours PRN  sodium chloride 0.9% lock flush 10 milliLiter(s) IV Push every 1 hour PRN      REVIEW OF SYSTEMS  --------------------------------------------------------------------------------  unable to assess     >>> <<<    VITALS/PHYSICAL EXAM  --------------------------------------------------------------------------------  T(C): 36.6 (12-09-20 @ 08:00), Max: 37.1 (12-09-20 @ 00:00)  HR: 73 (12-09-20 @ 08:00) (72 - 100)  BP: 119/58 (12-09-20 @ 08:00) (77/57 - 177/84)  RR: 25 (12-09-20 @ 08:00) (10 - 98)  SpO2: 100% (12-09-20 @ 08:00) (90% - 100%)  Wt(kg): --        12-08-20 @ 07:01  -  12-09-20 @ 07:00  --------------------------------------------------------  IN: 2366.4 mL / OUT: 2555 mL / NET: -188.6 mL      Physical Exam deferred due to COVID19 and PPE preservation          LABS/STUDIES  --------------------------------------------------------------------------------              8.6    10.81 >-----------<  129      [12-09-20 @ 02:07]              27.4     131  |  97  |  40  ----------------------------<  220      [12-09-20 @ 02:07]  5.0   |  18  |  2.57        Ca     9.0     [12-09-20 @ 02:07]      Mg     2.2     [12-09-20 @ 02:07]      Phos  4.4     [12-09-20 @ 02:07]    TPro  6.2  /  Alb  2.5  /  TBili  0.7  /  DBili  x   /  AST  43  /  ALT  <5  /  AlkPhos  90  [12-09-20 @ 02:07]    PT/INR: PT 17.9 , INR 1.52       [12-09-20 @ 02:07]  PTT: 101.5      [12-09-20 @ 02:07]          [12-09-20 @ 02:07]    Creatinine Trend:  SCr 2.57 [12-09 @ 02:07]  SCr 3.19 [12-08 @ 00:13]  SCr 1.96 [12-07 @ 00:38]  SCr 0.76 [12-06 @ 01:37]  SCr 0.80 [12-05 @ 21:48]    Urinalysis - [12-08-20 @ 18:04]      Color Yellow / Appearance Turbid / SG 1.013 / pH 6.0      Gluc Negative / Ketone Negative  / Bili Negative / Urobili <2 mg/dL       Blood Large / Protein 30 mg/dL / Leuk Est Large / Nitrite Negative       /  / Hyaline 0 / Gran  / Sq Epi  / Non Sq Epi 6 / Bacteria Negative      Ferritin 1082      [12-08-20 @ 03:18]  HbA1c 6.6      [04-16-16 @ 06:00]  Lipid: chol --, , HDL --, LDL --      [12-09-20 @ 02:07]    HBsAg Nonreact      [12-08-20 @ 03:38]  HCV 0.10, Nonreact      [11-19-20 @ 09:32]

## 2020-12-10 ENCOUNTER — INPATIENT (INPATIENT)
Facility: HOSPITAL | Age: 60
LOS: 98 days | Discharge: NURSING, REHAB OR LONG-TERM CARE | End: 2021-03-19
Attending: INTERNAL MEDICINE | Admitting: INTERNAL MEDICINE
Payer: COMMERCIAL

## 2020-12-10 ENCOUNTER — TRANSCRIPTION ENCOUNTER (OUTPATIENT)
Age: 60
End: 2020-12-10

## 2020-12-10 VITALS
WEIGHT: 253.53 LBS | OXYGEN SATURATION: 76 % | TEMPERATURE: 98 F | HEART RATE: 95 BPM | SYSTOLIC BLOOD PRESSURE: 110 MMHG | HEIGHT: 65 IN | DIASTOLIC BLOOD PRESSURE: 59 MMHG | RESPIRATION RATE: 25 BRPM

## 2020-12-10 VITALS — DIASTOLIC BLOOD PRESSURE: 78 MMHG | HEART RATE: 88 BPM | SYSTOLIC BLOOD PRESSURE: 137 MMHG

## 2020-12-10 DIAGNOSIS — Z98.89 OTHER SPECIFIED POSTPROCEDURAL STATES: Chronic | ICD-10-CM

## 2020-12-10 DIAGNOSIS — U07.1 COVID-19: ICD-10-CM

## 2020-12-10 LAB
ALBUMIN SERPL ELPH-MCNC: 2.5 G/DL — LOW (ref 3.3–5)
ALP SERPL-CCNC: 98 U/L — SIGNIFICANT CHANGE UP (ref 40–120)
ALT FLD-CCNC: <5 U/L — LOW (ref 10–45)
ANION GAP SERPL CALC-SCNC: 21 MMOL/L — HIGH (ref 5–17)
APTT BLD: 55 SEC — HIGH (ref 27.5–35.5)
APTT BLD: 61.3 SEC — HIGH (ref 27.5–35.5)
APTT BLD: >200 SEC — CRITICAL HIGH (ref 27.5–35.5)
AST SERPL-CCNC: 64 U/L — HIGH (ref 10–40)
BASE EXCESS BLDV CALC-SCNC: -4.9 MMOL/L — LOW (ref -2–2)
BILIRUB SERPL-MCNC: 0.8 MG/DL — SIGNIFICANT CHANGE UP (ref 0.2–1.2)
BUN SERPL-MCNC: 68 MG/DL — HIGH (ref 7–23)
CA-I SERPL-SCNC: 1.25 MMOL/L — SIGNIFICANT CHANGE UP (ref 1.12–1.3)
CALCIUM SERPL-MCNC: 8.9 MG/DL — SIGNIFICANT CHANGE UP (ref 8.4–10.5)
CHLORIDE BLDV-SCNC: 102 MMOL/L — SIGNIFICANT CHANGE UP (ref 96–108)
CHLORIDE SERPL-SCNC: 96 MMOL/L — SIGNIFICANT CHANGE UP (ref 96–108)
CO2 BLDV-SCNC: 22 MMOL/L — SIGNIFICANT CHANGE UP (ref 22–30)
CO2 SERPL-SCNC: 16 MMOL/L — LOW (ref 22–31)
CREAT SERPL-MCNC: 3.49 MG/DL — HIGH (ref 0.5–1.3)
CRP SERPL-MCNC: 10.36 MG/DL — HIGH (ref 0–0.4)
D DIMER BLD IA.RAPID-MCNC: 2334 NG/ML DDU — HIGH
FERRITIN SERPL-MCNC: 1025 NG/ML — HIGH (ref 15–150)
GAS PNL BLDV: 128 MMOL/L — LOW (ref 135–145)
GAS PNL BLDV: SIGNIFICANT CHANGE UP
GAS PNL BLDV: SIGNIFICANT CHANGE UP
GLUCOSE BLDC GLUCOMTR-MCNC: 113 MG/DL — HIGH (ref 70–99)
GLUCOSE BLDC GLUCOMTR-MCNC: 156 MG/DL — HIGH (ref 70–99)
GLUCOSE BLDC GLUCOMTR-MCNC: 161 MG/DL — HIGH (ref 70–99)
GLUCOSE BLDC GLUCOMTR-MCNC: 188 MG/DL — HIGH (ref 70–99)
GLUCOSE BLDV-MCNC: 243 MG/DL — HIGH (ref 70–99)
GLUCOSE SERPL-MCNC: 228 MG/DL — HIGH (ref 70–99)
HCO3 BLDV-SCNC: 21 MMOL/L — SIGNIFICANT CHANGE UP (ref 21–29)
HCT VFR BLD CALC: 27.7 % — LOW (ref 34.5–45)
HCT VFR BLDA CALC: 28 % — LOW (ref 39–50)
HGB BLD CALC-MCNC: 9.1 G/DL — LOW (ref 11.5–15.5)
HGB BLD-MCNC: 8.6 G/DL — LOW (ref 11.5–15.5)
HOROWITZ INDEX BLDV+IHG-RTO: 35 — SIGNIFICANT CHANGE UP
LACTATE BLDV-MCNC: 1.4 MMOL/L — SIGNIFICANT CHANGE UP (ref 0.7–2)
LDH SERPL L TO P-CCNC: 551 U/L — HIGH (ref 50–242)
MAGNESIUM SERPL-MCNC: 2.1 MG/DL — SIGNIFICANT CHANGE UP (ref 1.6–2.6)
MCHC RBC-ENTMCNC: 29.1 PG — SIGNIFICANT CHANGE UP (ref 27–34)
MCHC RBC-ENTMCNC: 31 GM/DL — LOW (ref 32–36)
MCV RBC AUTO: 93.6 FL — SIGNIFICANT CHANGE UP (ref 80–100)
NRBC # BLD: 0 /100 WBCS — SIGNIFICANT CHANGE UP (ref 0–0)
OTHER CELLS CSF MANUAL: 9 ML/DL — LOW (ref 18–22)
PCO2 BLDV: 46 MMHG — SIGNIFICANT CHANGE UP (ref 35–50)
PH BLDV: 7.28 — LOW (ref 7.35–7.45)
PHOSPHATE SERPL-MCNC: 5.5 MG/DL — HIGH (ref 2.5–4.5)
PLATELET # BLD AUTO: 157 K/UL — SIGNIFICANT CHANGE UP (ref 150–400)
PO2 BLDV: 45 MMHG — SIGNIFICANT CHANGE UP (ref 25–45)
POTASSIUM BLDV-SCNC: 3.8 MMOL/L — SIGNIFICANT CHANGE UP (ref 3.5–5.3)
POTASSIUM SERPL-MCNC: 4.9 MMOL/L — SIGNIFICANT CHANGE UP (ref 3.5–5.3)
POTASSIUM SERPL-SCNC: 4.9 MMOL/L — SIGNIFICANT CHANGE UP (ref 3.5–5.3)
PROT SERPL-MCNC: 6.8 G/DL — SIGNIFICANT CHANGE UP (ref 6–8.3)
RBC # BLD: 2.96 M/UL — LOW (ref 3.8–5.2)
RBC # FLD: 22.1 % — HIGH (ref 10.3–14.5)
SAO2 % BLDV: 71 % — SIGNIFICANT CHANGE UP (ref 67–88)
SODIUM SERPL-SCNC: 133 MMOL/L — LOW (ref 135–145)
WBC # BLD: 10.64 K/UL — HIGH (ref 3.8–10.5)
WBC # FLD AUTO: 10.64 K/UL — HIGH (ref 3.8–10.5)

## 2020-12-10 PROCEDURE — 87449 NOS EACH ORGANISM AG IA: CPT

## 2020-12-10 PROCEDURE — 82728 ASSAY OF FERRITIN: CPT

## 2020-12-10 PROCEDURE — 84156 ASSAY OF PROTEIN URINE: CPT

## 2020-12-10 PROCEDURE — 99233 SBSQ HOSP IP/OBS HIGH 50: CPT | Mod: GC

## 2020-12-10 PROCEDURE — 71275 CT ANGIOGRAPHY CHEST: CPT

## 2020-12-10 PROCEDURE — 80202 ASSAY OF VANCOMYCIN: CPT

## 2020-12-10 PROCEDURE — 80048 BASIC METABOLIC PNL TOTAL CA: CPT

## 2020-12-10 PROCEDURE — 94003 VENT MGMT INPAT SUBQ DAY: CPT

## 2020-12-10 PROCEDURE — 86803 HEPATITIS C AB TEST: CPT

## 2020-12-10 PROCEDURE — 82010 KETONE BODYS QUAN: CPT

## 2020-12-10 PROCEDURE — 82962 GLUCOSE BLOOD TEST: CPT

## 2020-12-10 PROCEDURE — 87186 SC STD MICRODIL/AGAR DIL: CPT

## 2020-12-10 PROCEDURE — 71045 X-RAY EXAM CHEST 1 VIEW: CPT

## 2020-12-10 PROCEDURE — 86901 BLOOD TYPING SEROLOGIC RH(D): CPT

## 2020-12-10 PROCEDURE — 36430 TRANSFUSION BLD/BLD COMPNT: CPT

## 2020-12-10 PROCEDURE — 82803 BLOOD GASES ANY COMBINATION: CPT

## 2020-12-10 PROCEDURE — 82248 BILIRUBIN DIRECT: CPT

## 2020-12-10 PROCEDURE — 86140 C-REACTIVE PROTEIN: CPT

## 2020-12-10 PROCEDURE — 84560 ASSAY OF URINE/URIC ACID: CPT

## 2020-12-10 PROCEDURE — 82947 ASSAY GLUCOSE BLOOD QUANT: CPT

## 2020-12-10 PROCEDURE — P9016: CPT

## 2020-12-10 PROCEDURE — 84133 ASSAY OF URINE POTASSIUM: CPT

## 2020-12-10 PROCEDURE — 74177 CT ABD & PELVIS W/CONTRAST: CPT

## 2020-12-10 PROCEDURE — 0225U NFCT DS DNA&RNA 21 SARSCOV2: CPT

## 2020-12-10 PROCEDURE — 82435 ASSAY OF BLOOD CHLORIDE: CPT

## 2020-12-10 PROCEDURE — 80076 HEPATIC FUNCTION PANEL: CPT

## 2020-12-10 PROCEDURE — 85018 HEMOGLOBIN: CPT

## 2020-12-10 PROCEDURE — 82140 ASSAY OF AMMONIA: CPT

## 2020-12-10 PROCEDURE — 84540 ASSAY OF URINE/UREA-N: CPT

## 2020-12-10 PROCEDURE — 84484 ASSAY OF TROPONIN QUANT: CPT

## 2020-12-10 PROCEDURE — 84100 ASSAY OF PHOSPHORUS: CPT

## 2020-12-10 PROCEDURE — 99291 CRITICAL CARE FIRST HOUR: CPT

## 2020-12-10 PROCEDURE — 84145 PROCALCITONIN (PCT): CPT

## 2020-12-10 PROCEDURE — 81001 URINALYSIS AUTO W/SCOPE: CPT

## 2020-12-10 PROCEDURE — 93306 TTE W/DOPPLER COMPLETE: CPT

## 2020-12-10 PROCEDURE — 85025 COMPLETE CBC W/AUTO DIFF WBC: CPT

## 2020-12-10 PROCEDURE — 85730 THROMBOPLASTIN TIME PARTIAL: CPT

## 2020-12-10 PROCEDURE — P9047: CPT

## 2020-12-10 PROCEDURE — 93005 ELECTROCARDIOGRAM TRACING: CPT

## 2020-12-10 PROCEDURE — 84105 ASSAY OF URINE PHOSPHORUS: CPT

## 2020-12-10 PROCEDURE — 94660 CPAP INITIATION&MGMT: CPT

## 2020-12-10 PROCEDURE — 86769 SARS-COV-2 COVID-19 ANTIBODY: CPT

## 2020-12-10 PROCEDURE — 85014 HEMATOCRIT: CPT

## 2020-12-10 PROCEDURE — 85610 PROTHROMBIN TIME: CPT

## 2020-12-10 PROCEDURE — 83935 ASSAY OF URINE OSMOLALITY: CPT

## 2020-12-10 PROCEDURE — 85027 COMPLETE CBC AUTOMATED: CPT

## 2020-12-10 PROCEDURE — 87070 CULTURE OTHR SPECIMN AEROBIC: CPT

## 2020-12-10 PROCEDURE — 82330 ASSAY OF CALCIUM: CPT

## 2020-12-10 PROCEDURE — 86900 BLOOD TYPING SEROLOGIC ABO: CPT

## 2020-12-10 PROCEDURE — 85379 FIBRIN DEGRADATION QUANT: CPT

## 2020-12-10 PROCEDURE — 86923 COMPATIBILITY TEST ELECTRIC: CPT

## 2020-12-10 PROCEDURE — 84295 ASSAY OF SERUM SODIUM: CPT

## 2020-12-10 PROCEDURE — 87040 BLOOD CULTURE FOR BACTERIA: CPT

## 2020-12-10 PROCEDURE — 99285 EMERGENCY DEPT VISIT HI MDM: CPT | Mod: 25

## 2020-12-10 PROCEDURE — 93970 EXTREMITY STUDY: CPT

## 2020-12-10 PROCEDURE — 84132 ASSAY OF SERUM POTASSIUM: CPT

## 2020-12-10 PROCEDURE — 71260 CT THORAX DX C+: CPT

## 2020-12-10 PROCEDURE — 80053 COMPREHEN METABOLIC PANEL: CPT

## 2020-12-10 PROCEDURE — 99261: CPT

## 2020-12-10 PROCEDURE — 94640 AIRWAY INHALATION TREATMENT: CPT

## 2020-12-10 PROCEDURE — 83036 HEMOGLOBIN GLYCOSYLATED A1C: CPT

## 2020-12-10 PROCEDURE — 94002 VENT MGMT INPAT INIT DAY: CPT

## 2020-12-10 PROCEDURE — 93308 TTE F-UP OR LMTD: CPT

## 2020-12-10 PROCEDURE — 87086 URINE CULTURE/COLONY COUNT: CPT

## 2020-12-10 PROCEDURE — 83880 ASSAY OF NATRIURETIC PEPTIDE: CPT

## 2020-12-10 PROCEDURE — 82565 ASSAY OF CREATININE: CPT

## 2020-12-10 PROCEDURE — 87150 DNA/RNA AMPLIFIED PROBE: CPT

## 2020-12-10 PROCEDURE — 84300 ASSAY OF URINE SODIUM: CPT

## 2020-12-10 PROCEDURE — 93321 DOPPLER ECHO F-UP/LMTD STD: CPT

## 2020-12-10 PROCEDURE — 83605 ASSAY OF LACTIC ACID: CPT

## 2020-12-10 PROCEDURE — 71045 X-RAY EXAM CHEST 1 VIEW: CPT | Mod: 26

## 2020-12-10 PROCEDURE — U0003: CPT

## 2020-12-10 PROCEDURE — 83735 ASSAY OF MAGNESIUM: CPT

## 2020-12-10 PROCEDURE — 83615 LACTATE (LD) (LDH) ENZYME: CPT

## 2020-12-10 PROCEDURE — 87340 HEPATITIS B SURFACE AG IA: CPT

## 2020-12-10 PROCEDURE — 84478 ASSAY OF TRIGLYCERIDES: CPT

## 2020-12-10 PROCEDURE — 86850 RBC ANTIBODY SCREEN: CPT

## 2020-12-10 RX ORDER — SODIUM CHLORIDE 9 MG/ML
1000 INJECTION, SOLUTION INTRAVENOUS
Refills: 0 | Status: DISCONTINUED | OUTPATIENT
Start: 2020-12-10 | End: 2020-12-11

## 2020-12-10 RX ORDER — DEXTROSE 50 % IN WATER 50 %
15 SYRINGE (ML) INTRAVENOUS ONCE
Refills: 0 | Status: DISCONTINUED | OUTPATIENT
Start: 2020-12-10 | End: 2020-12-11

## 2020-12-10 RX ORDER — POLYETHYLENE GLYCOL 3350 17 G/17G
17 POWDER, FOR SOLUTION ORAL
Qty: 0 | Refills: 0 | DISCHARGE
Start: 2020-12-10

## 2020-12-10 RX ORDER — HUMAN INSULIN 100 [IU]/ML
10 INJECTION, SUSPENSION SUBCUTANEOUS EVERY 6 HOURS
Refills: 0 | Status: DISCONTINUED | OUTPATIENT
Start: 2020-12-10 | End: 2020-12-23

## 2020-12-10 RX ORDER — POLYETHYLENE GLYCOL 3350 17 G/17G
17 POWDER, FOR SOLUTION ORAL DAILY
Refills: 0 | Status: DISCONTINUED | OUTPATIENT
Start: 2020-12-10 | End: 2020-12-30

## 2020-12-10 RX ORDER — INSULIN LISPRO 100/ML
0 VIAL (ML) SUBCUTANEOUS
Qty: 0 | Refills: 0 | DISCHARGE
Start: 2020-12-10

## 2020-12-10 RX ORDER — ACETAMINOPHEN 500 MG
20.31 TABLET ORAL
Qty: 0 | Refills: 0 | DISCHARGE
Start: 2020-12-10

## 2020-12-10 RX ORDER — DEXMEDETOMIDINE HYDROCHLORIDE IN 0.9% SODIUM CHLORIDE 4 UG/ML
0 INJECTION INTRAVENOUS
Qty: 0 | Refills: 0 | DISCHARGE
Start: 2020-12-10

## 2020-12-10 RX ORDER — LISINOPRIL 2.5 MG/1
1 TABLET ORAL
Qty: 0 | Refills: 0 | DISCHARGE

## 2020-12-10 RX ORDER — ALBUTEROL 90 UG/1
4 AEROSOL, METERED ORAL
Qty: 0 | Refills: 0 | DISCHARGE
Start: 2020-12-10

## 2020-12-10 RX ORDER — CHLORHEXIDINE GLUCONATE 213 G/1000ML
1 SOLUTION TOPICAL
Refills: 0 | Status: DISCONTINUED | OUTPATIENT
Start: 2020-12-10 | End: 2020-12-28

## 2020-12-10 RX ORDER — HEPARIN SODIUM 5000 [USP'U]/ML
0 INJECTION INTRAVENOUS; SUBCUTANEOUS
Qty: 0 | Refills: 0 | DISCHARGE
Start: 2020-12-10

## 2020-12-10 RX ORDER — HUMAN INSULIN 100 [IU]/ML
10 INJECTION, SUSPENSION SUBCUTANEOUS
Qty: 0 | Refills: 0 | DISCHARGE
Start: 2020-12-10

## 2020-12-10 RX ORDER — SENNA PLUS 8.6 MG/1
15 TABLET ORAL
Refills: 0 | Status: DISCONTINUED | OUTPATIENT
Start: 2020-12-10 | End: 2020-12-30

## 2020-12-10 RX ORDER — CHLORHEXIDINE GLUCONATE 213 G/1000ML
15 SOLUTION TOPICAL EVERY 12 HOURS
Refills: 0 | Status: DISCONTINUED | OUTPATIENT
Start: 2020-12-10 | End: 2020-12-28

## 2020-12-10 RX ORDER — NOREPINEPHRINE BITARTRATE/D5W 8 MG/250ML
0.05 PLASTIC BAG, INJECTION (ML) INTRAVENOUS
Qty: 8 | Refills: 0 | Status: DISCONTINUED | OUTPATIENT
Start: 2020-12-10 | End: 2020-12-11

## 2020-12-10 RX ORDER — DEXTROSE 50 % IN WATER 50 %
25 SYRINGE (ML) INTRAVENOUS ONCE
Refills: 0 | Status: DISCONTINUED | OUTPATIENT
Start: 2020-12-10 | End: 2020-12-11

## 2020-12-10 RX ORDER — BUDESONIDE AND FORMOTEROL FUMARATE DIHYDRATE 160; 4.5 UG/1; UG/1
0 AEROSOL RESPIRATORY (INHALATION)
Qty: 0 | Refills: 3 | DISCHARGE

## 2020-12-10 RX ORDER — MIDODRINE HYDROCHLORIDE 2.5 MG/1
30 TABLET ORAL EVERY 8 HOURS
Refills: 0 | Status: DISCONTINUED | OUTPATIENT
Start: 2020-12-10 | End: 2020-12-19

## 2020-12-10 RX ORDER — DEXMEDETOMIDINE HYDROCHLORIDE IN 0.9% SODIUM CHLORIDE 4 UG/ML
0.5 INJECTION INTRAVENOUS
Qty: 400 | Refills: 0 | Status: DISCONTINUED | OUTPATIENT
Start: 2020-12-10 | End: 2020-12-22

## 2020-12-10 RX ORDER — AMLODIPINE BESYLATE 2.5 MG/1
1 TABLET ORAL
Qty: 0 | Refills: 0 | DISCHARGE

## 2020-12-10 RX ORDER — RIVAROXABAN 15 MG-20MG
1 KIT ORAL
Qty: 0 | Refills: 0 | DISCHARGE

## 2020-12-10 RX ORDER — LACTULOSE 10 G/15ML
22.5 SOLUTION ORAL
Qty: 0 | Refills: 0 | DISCHARGE
Start: 2020-12-10

## 2020-12-10 RX ORDER — ALBUTEROL 90 UG/1
2 AEROSOL, METERED ORAL
Qty: 0 | Refills: 0 | DISCHARGE

## 2020-12-10 RX ORDER — CEFAZOLIN SODIUM 1 G
2 VIAL (EA) INJECTION
Qty: 0 | Refills: 0 | DISCHARGE
Start: 2020-12-10

## 2020-12-10 RX ORDER — HEPARIN SODIUM 5000 [USP'U]/ML
1200 INJECTION INTRAVENOUS; SUBCUTANEOUS
Qty: 25000 | Refills: 0 | Status: DISCONTINUED | OUTPATIENT
Start: 2020-12-10 | End: 2020-12-11

## 2020-12-10 RX ORDER — PANTOPRAZOLE SODIUM 20 MG/1
40 TABLET, DELAYED RELEASE ORAL
Qty: 0 | Refills: 0 | DISCHARGE
Start: 2020-12-10

## 2020-12-10 RX ORDER — GLUCAGON INJECTION, SOLUTION 0.5 MG/.1ML
1 INJECTION, SOLUTION SUBCUTANEOUS ONCE
Refills: 0 | Status: DISCONTINUED | OUTPATIENT
Start: 2020-12-10 | End: 2020-12-11

## 2020-12-10 RX ORDER — INSULIN LISPRO 100/ML
VIAL (ML) SUBCUTANEOUS EVERY 6 HOURS
Refills: 0 | Status: DISCONTINUED | OUTPATIENT
Start: 2020-12-10 | End: 2021-03-03

## 2020-12-10 RX ORDER — CEFAZOLIN SODIUM 1 G
1000 VIAL (EA) INJECTION EVERY 24 HOURS
Refills: 0 | Status: DISCONTINUED | OUTPATIENT
Start: 2020-12-10 | End: 2020-12-11

## 2020-12-10 RX ORDER — IPRATROPIUM BROMIDE 0.2 MG/ML
2 SOLUTION, NON-ORAL INHALATION
Qty: 0 | Refills: 0 | DISCHARGE
Start: 2020-12-10

## 2020-12-10 RX ORDER — DEXTROSE 50 % IN WATER 50 %
12.5 SYRINGE (ML) INTRAVENOUS ONCE
Refills: 0 | Status: DISCONTINUED | OUTPATIENT
Start: 2020-12-10 | End: 2020-12-11

## 2020-12-10 RX ORDER — CEFAZOLIN SODIUM 1 G
2000 VIAL (EA) INJECTION EVERY 12 HOURS
Refills: 0 | Status: DISCONTINUED | OUTPATIENT
Start: 2020-12-10 | End: 2020-12-10

## 2020-12-10 RX ORDER — MIDODRINE HYDROCHLORIDE 2.5 MG/1
3 TABLET ORAL
Qty: 0 | Refills: 0 | DISCHARGE
Start: 2020-12-10

## 2020-12-10 RX ADMIN — Medication 1 APPLICATION(S): at 05:37

## 2020-12-10 RX ADMIN — Medication 1: at 05:35

## 2020-12-10 RX ADMIN — NYSTATIN CREAM 1 APPLICATION(S): 100000 CREAM TOPICAL at 05:37

## 2020-12-10 RX ADMIN — Medication 2 PUFF(S): at 04:25

## 2020-12-10 RX ADMIN — ALBUTEROL 4 PUFF(S): 90 AEROSOL, METERED ORAL at 00:03

## 2020-12-10 RX ADMIN — MIDODRINE HYDROCHLORIDE 30 MILLIGRAM(S): 2.5 TABLET ORAL at 17:53

## 2020-12-10 RX ADMIN — HUMAN INSULIN 10 UNIT(S): 100 INJECTION, SUSPENSION SUBCUTANEOUS at 05:34

## 2020-12-10 RX ADMIN — CHLORHEXIDINE GLUCONATE 15 MILLILITER(S): 213 SOLUTION TOPICAL at 05:36

## 2020-12-10 RX ADMIN — Medication 1 APPLICATION(S): at 05:36

## 2020-12-10 RX ADMIN — PANTOPRAZOLE SODIUM 40 MILLIGRAM(S): 20 TABLET, DELAYED RELEASE ORAL at 11:55

## 2020-12-10 RX ADMIN — SENNA PLUS 15 MILLILITER(S): 8.6 TABLET ORAL at 17:54

## 2020-12-10 RX ADMIN — HUMAN INSULIN 10 UNIT(S): 100 INJECTION, SUSPENSION SUBCUTANEOUS at 11:51

## 2020-12-10 RX ADMIN — CHLORHEXIDINE GLUCONATE 1 APPLICATION(S): 213 SOLUTION TOPICAL at 17:52

## 2020-12-10 RX ADMIN — Medication 100 MILLIGRAM(S): at 18:09

## 2020-12-10 RX ADMIN — Medication 2: at 18:03

## 2020-12-10 RX ADMIN — Medication 1: at 11:50

## 2020-12-10 RX ADMIN — HUMAN INSULIN 10 UNIT(S): 100 INJECTION, SUSPENSION SUBCUTANEOUS at 18:01

## 2020-12-10 RX ADMIN — CHLORHEXIDINE GLUCONATE 15 MILLILITER(S): 213 SOLUTION TOPICAL at 18:04

## 2020-12-10 RX ADMIN — HUMAN INSULIN 10 UNIT(S): 100 INJECTION, SUSPENSION SUBCUTANEOUS at 23:23

## 2020-12-10 RX ADMIN — DEXMEDETOMIDINE HYDROCHLORIDE IN 0.9% SODIUM CHLORIDE 14.4 MICROGRAM(S)/KG/HR: 4 INJECTION INTRAVENOUS at 17:53

## 2020-12-10 RX ADMIN — LACTULOSE 15 GRAM(S): 10 SOLUTION ORAL at 05:36

## 2020-12-10 RX ADMIN — POLYETHYLENE GLYCOL 3350 17 GRAM(S): 17 POWDER, FOR SOLUTION ORAL at 05:36

## 2020-12-10 RX ADMIN — DEXMEDETOMIDINE HYDROCHLORIDE IN 0.9% SODIUM CHLORIDE 14.4 MICROGRAM(S)/KG/HR: 4 INJECTION INTRAVENOUS at 20:04

## 2020-12-10 RX ADMIN — Medication 2 PUFF(S): at 11:31

## 2020-12-10 RX ADMIN — MIDODRINE HYDROCHLORIDE 30 MILLIGRAM(S): 2.5 TABLET ORAL at 23:24

## 2020-12-10 RX ADMIN — MIDODRINE HYDROCHLORIDE 30 MILLIGRAM(S): 2.5 TABLET ORAL at 05:36

## 2020-12-10 RX ADMIN — CHLORHEXIDINE GLUCONATE 1 APPLICATION(S): 213 SOLUTION TOPICAL at 05:36

## 2020-12-10 RX ADMIN — ALBUTEROL 4 PUFF(S): 90 AEROSOL, METERED ORAL at 04:25

## 2020-12-10 RX ADMIN — Medication 2 PUFF(S): at 00:03

## 2020-12-10 RX ADMIN — ALBUTEROL 4 PUFF(S): 90 AEROSOL, METERED ORAL at 11:31

## 2020-12-10 NOTE — PROGRESS NOTE ADULT - ASSESSMENT
Pt is a 59 y/o F w PMH of AARON, HTN, HLD, prior DVT/PE hx, asthma presented to [facility] for SOB and productive cough prior to admission. Admitted for acute hypoxic respiratory failure 2/2 to COVID infection. Was subsequently intubated and proned on 11/23//20. Was transferred to ICU for further management. Was started on pressors and nimbex drip. Nephrology consulted for ATREMIO    #ARTEMIO  Pt with ARTEMIO in the setting of septic shock 2/2 to COVID infection. Now likely in ATN. Noted to have Scr of 0.53 on 11/23/20, then Scr further increased to 1.64 on 11/24/20, 2.18 on 11/25/20 and then further to 2.57 on 11/26/20 and 3.0 on 11/27. Patient was started on CRRT 11/27, held on 12/6 for diuretic challenge. Received HD on 12/8 with 1.3L removed. Non- Oliguric, 895cc in last 24hrs. Recommend to c/w diuretic at this time. Will discuss with MICU team regarding HD today to supplement UOP while providing clearance ( electrolytes stable and no urgent indication noted), would need to increase IV pressor dose to facilitate and avoid further episodes of hypotension and ATN which would hamper renal recovery. Monitor labs and urine output. Avoid NSAIDs, ACEI/ARBS, RCA and nephrotoxins. Dose medications as per eGFR<10

## 2020-12-10 NOTE — PROGRESS NOTE ADULT - ATTENDING COMMENTS
1. Acute hypoxemic respiratory with ARDS from SARS 2, Covid -19  pneumonia. Pt also with morbid obesity with BMI~58. Tolerating proning with good results. Currently  supined on AC 8 peep 30%. Trial of PS as tolerated.  2..Id. S. Aureus ,S. Hominus, and Proteus bacteremia. Continue Cefazolin. WBC  slowly decreasing. . Levaquin for  given for 7 days . Now off. Ct chest shows pulmonary abscess due to staph aureus. Continue Cefazolin.  3. Renal failure from ATN. Trial of Bumex and metolazone without good results.

## 2020-12-10 NOTE — H&P ADULT - NSHPREVIEWOFSYSTEMS_GEN_ALL_CORE
REVIEW OF SYSTEMS:  Constitutional: [ ] negative [ ] fevers [ ] chills [ ] weight loss [ ] weight gain  HEENT: [ ] negative [ ] dry eyes [ ] eye irritation [ ] postnasal drip [ ] nasal congestion  CV: [ ] negative  [ ] chest pain [ ] orthopnea [ ] palpitations [ ] murmur  Resp: [ ] negative [ ] cough [ ] shortness of breath [ ] dyspnea [ ] wheezing [ ] sputum [ ] hemoptysis  GI: [ ] negative [ ] nausea [ ] vomiting [ ] diarrhea [ ] constipation [ ] abd pain [ ] dysphagia   : [ ] negative [ ] dysuria [ ] nocturia [ ] hematuria [ ] increased urinary frequency  Musculoskeletal: [ ] negative [ ] back pain [ ] myalgias [ ] arthralgias [ ] fracture  Skin: [ ] negative [ ] rash [ ] itch  Neurological: [ ] negative [ ] headache [ ] dizziness [ ] syncope [ ] weakness [ ] numbness  Psychiatric: [ ] negative [ ] anxiety [ ] depression  Endocrine: [ ] negative [ ] diabetes [ ] thyroid problem  Hematologic/Lymphatic: [ ] negative [ ] anemia [ ] bleeding problem  Allergic/Immunologic: [ ] negative [ ] itchy eyes [ ] nasal discharge [ ] hives [ ] angioedema  [ ] All other systems negative  [x ] Unable to assess ROS because patient is intubated/sedated

## 2020-12-10 NOTE — DISCHARGE NOTE PROVIDER - HOSPITAL COURSE
Patient is a 61yo F w/ AARON, HTN, prior DVT/PE, ?Asthma admitted on 11/18/2020 after presenting for  productive cough  w/ SOB x9 days and diarrhea x7 days and fever x1 day. Patient was found  to be COVID + on 11/17/2020 and completed remdesivir 11/18-11/22. Patient was intubated for airway protection on 11/23/2020. Patient has required proning (last 12/4/2020). Hospital course c/b by ATN, requiring CVVHD now on intermittent HD, unresponsive to bumex challenge. Course also c/b Stenotrophomonas (12/11, completed Levaquin x7 days), MSSA/P. mirablis bacteremia (on Cefazolin, potentially due to urine vs line-associated and MSSA in sputum due to PNA), also with lung abscesses on CT chest.  Patient currently on Volume AC 30/350/8/35% on Precedex. Patient intermittently opens eyes but is not yet responsive. Patient able to pressure support on 12 over 8. Patient on Heparin gtt for prior history of DVT/PE. Patient HD stable on Midodrine 30 q8h.

## 2020-12-10 NOTE — DISCHARGE NOTE PROVIDER - NSDCCPCAREPLAN_GEN_ALL_CORE_FT
PRINCIPAL DISCHARGE DIAGNOSIS  Diagnosis: COVID-19  Assessment and Plan of Treatment:       SECONDARY DISCHARGE DIAGNOSES  Diagnosis: Acute renal failure  Assessment and Plan of Treatment:     Diagnosis: Infection due to Stenotrophomonas maltophilia  Assessment and Plan of Treatment:     Diagnosis: Bacterial infection due to Proteus mirabilis  Assessment and Plan of Treatment:     Diagnosis: MSSA bacteremia  Assessment and Plan of Treatment:     Diagnosis: Diarrhea  Assessment and Plan of Treatment:     Diagnosis: Cough  Assessment and Plan of Treatment:

## 2020-12-10 NOTE — H&P ADULT - NSHPLABSRESULTS_GEN_ALL_CORE
8.6    10.64 )-----------( 157      ( 10 Dec 2020 01:44 )             27.7     12-10    133<L>  |  96  |  68<H>  ----------------------------<  228<H>  4.9   |  16<L>  |  3.49<H>    Ca    8.9      10 Dec 2020 01:51  Phos  5.5     12-10  Mg     2.1     12-10    TPro  6.8  /  Alb  2.5<L>  /  TBili  0.8  /  DBili  x   /  AST  64<H>  /  ALT  <5<L>  /  AlkPhos  98  12-10  LIVER FUNCTIONS - ( 10 Dec 2020 01:51 )  Alb: 2.5 g/dL / Pro: 6.8 g/dL / ALK PHOS: 98 U/L / ALT: <5 U/L / AST: 64 U/L / GGT: x           PT/INR - ( 09 Dec 2020 02:07 )   PT: 17.9 sec;   INR: 1.52 ratio         PTT - ( 10 Dec 2020 12:08 )  PTT:61.3 sec  ABG - ( 09 Dec 2020 04:16 )  pH, Arterial: 7.38  pH, Blood: x     /  pCO2: 38    /  pO2: 76    / HCO3: 22    / Base Excess: -2.5  /  SaO2: 95

## 2020-12-10 NOTE — PROGRESS NOTE ADULT - NSHPATTENDINGPLANDISCUSS_GEN_ALL_CORE
5ICU
MICU attending and team
MICU team
MICU team, attending
TALHA Soni
TALHA Soni

## 2020-12-10 NOTE — PROGRESS NOTE ADULT - ATTENDING COMMENTS
Remains intubated, off CRRT  1.  ATN--slow recovery but increasing UO.  Still indications for RRT but no urgent considerations today  2.  Respiratory failure, acute--volume optimizing with FIO2 declining.   NO urgent UF requirements at present  3.  HYpotension--on PO midodrine.  Likely IV pressor if requires RRT related UF to achieve goal

## 2020-12-10 NOTE — PROGRESS NOTE ADULT - SUBJECTIVE AND OBJECTIVE BOX
[facility] DIVISION OF KIDNEY DISEASES AND HYPERTENSION -- FOLLOW UP NOTE  --------------------------------------------------------------------------------  Shashank Murillo   Nephrology Fellow  Pager NS: [phone]/ [facility]: [phone]  (After 5 pm or on weekends please page the on-call fellow)      Patient is a 60y old  Female who presents with a chief complaint of Acute hypoxemic respiratory failure 2/2 covid19 (10 Dec 2020 07:24)      24 hour events/subjective:  Vital signs, labs, medications reviewed. Remains non-oliguric on IV diuretics. Intubated, 35% FIo2 and PEEP 8        PAST HISTORY  --------------------------------------------------------------------------------  No significant changes to PMH, PSH, FHx, SHx, unless otherwise noted    ALLERGIES & MEDICATIONS  --------------------------------------------------------------------------------  Allergies    Kiwi (Unknown)  latex (Anaphylaxis)  latex (Unknown)  penicillin (Other)  penicillin (Unknown)  perfume  hives (Other)  potassium acetate (Other)  soap additives hives (Other)    Intolerances      Standing Inpatient Medications  ALBUTerol    90 MICROgram(s) HFA Inhaler 4 Puff(s) Inhalation every 6 hours  BACItracin   Ointment 1 Application(s) Topical two times a day  ceFAZolin   IVPB 2000 milliGRAM(s) IV Intermittent every 24 hours  chlorhexidine 0.12% Liquid 15 milliLiter(s) Oral Mucosa every 12 hours  chlorhexidine 4% Liquid 1 Application(s) Topical <User Schedule>  chlorhexidine 4% Liquid 1 Application(s) Topical <User Schedule>  dexMEDEtomidine Infusion 0.2 MICROgram(s)/kG/Hr IV Continuous <Continuous>  dextrose 40% Gel 15 Gram(s) Oral once  dextrose 5%. 1000 milliLiter(s) IV Continuous <Continuous>  dextrose 5%. 1000 milliLiter(s) IV Continuous <Continuous>  dextrose 50% Injectable 25 Gram(s) IV Push once  dextrose 50% Injectable 12.5 Gram(s) IV Push once  dextrose 50% Injectable 25 Gram(s) IV Push once  glucagon  Injectable 1 milliGRAM(s) IntraMuscular once  heparin  Infusion 1300 Unit(s)/Hr IV Continuous <Continuous>  insulin lispro (ADMELOG) corrective regimen sliding scale   SubCutaneous every 6 hours  insulin NPH human recombinant 10 Unit(s) SubCutaneous every 6 hours  ipratropium 17 MICROgram(s) HFA Inhaler 2 Puff(s) Inhalation every 6 hours  lactulose Syrup 15 Gram(s) Oral two times a day  midodrine 30 milliGRAM(s) Oral every 8 hours  nystatin Powder 1 Application(s) Topical two times a day  pantoprazole  Injectable 40 milliGRAM(s) IV Push daily  petrolatum Ophthalmic Ointment 1 Application(s) Both EYES two times a day  polyethylene glycol 3350 17 Gram(s) Oral every 12 hours    PRN Inpatient Medications  acetaminophen    Suspension .. 650 milliGRAM(s) Oral every 6 hours PRN  sodium chloride 0.9% lock flush 10 milliLiter(s) IV Push every 1 hour PRN      REVIEW OF SYSTEMS  --------------------------------------------------------------------------------  Unable to assess    >>> <<<    VITALS/PHYSICAL EXAM  --------------------------------------------------------------------------------  T(C): 35.8 (12-10-20 @ 06:00), Max: 36.9 (12-09-20 @ 14:00)  HR: 92 (12-10-20 @ 06:00) (68 - 113)  BP: 113/53 (12-10-20 @ 06:00) (104/42 - 147/68)  RR: 37 (12-10-20 @ 06:00) (8 - 38)  SpO2: 95% (12-10-20 @ 06:00) (91% - 100%)  Wt(kg): --        12-09-20 @ 07:01  -  12-10-20 @ 07:00  --------------------------------------------------------  IN: 1372.4 mL / OUT: 895 mL / NET: 477.4 mL      Physical Exam deferred due to COVID19 and PPE preservation    	      LABS/STUDIES  --------------------------------------------------------------------------------              8.6    10.64 >-----------<  157      [12-10-20 @ 01:44]              27.7     133  |  96  |  68  ----------------------------<  228      [12-10-20 @ 01:51]  4.9   |  16  |  3.49        Ca     8.9     [12-10-20 @ 01:51]      Mg     2.1     [12-10-20 @ 01:51]      Phos  5.5     [12-10-20 @ 01:51]    TPro  6.8  /  Alb  2.5  /  TBili  0.8  /  DBili  x   /  AST  64  /  ALT  <5  /  AlkPhos  98  [12-10-20 @ 01:51]    PT/INR: PT 17.9 , INR 1.52       [12-09-20 @ 02:07]  PTT: 55.0       [12-10-20 @ 04:42]          [12-10-20 @ 01:51]    Creatinine Trend:  SCr 3.49 [12-10 @ 01:51]  SCr 3.21 [12-09 @ 19:16]  SCr 2.57 [12-09 @ 02:07]  SCr 3.19 [12-08 @ 00:13]  SCr 1.96 [12-07 @ 00:38]    Urinalysis - [12-08-20 @ 18:04]      Color Yellow / Appearance Turbid / SG 1.013 / pH 6.0      Gluc Negative / Ketone Negative  / Bili Negative / Urobili <2 mg/dL       Blood Large / Protein 30 mg/dL / Leuk Est Large / Nitrite Negative       /  / Hyaline 0 / Gran  / Sq Epi  / Non Sq Epi 6 / Bacteria Negative      Ferritin 995      [12-09-20 @ 08:16]  HbA1c 6.6      [04-16-16 @ 06:00]  Lipid: chol --, , HDL --, LDL --      [12-09-20 @ 02:07]    HBsAg Nonreact      [12-08-20 @ 03:38]  HCV 0.10, Nonreact      [11-19-20 @ 09:32]

## 2020-12-10 NOTE — DISCHARGE NOTE PROVIDER - NSDCMRMEDTOKEN_GEN_ALL_CORE_FT
acetaminophen 160 mg/5 mL oral suspension: 20.31 milliliter(s) orally every 6 hours, As needed, Temp greater or equal to 38C (100.4F)  albuterol 90 mcg/inh inhalation aerosol: 4 puff(s) inhaled every 6 hours  ceFAZolin 2 g intravenous injection: 2 gram(s) intravenous every 24 hours  dexmedetomidine:   heparin:   insulin isophane (NPH) 100 units/mL human recombinant subcutaneous suspension: 10 unit(s) subcutaneous every 6 hours  insulin lispro 100 units/mL injectable solution:  injectable   ipratropium CFC free 17 mcg/inh inhalation aerosol: 2 puff(s) inhaled every 6 hours  lactulose 10 g/15 mL oral syrup: 22.5 milliliter(s) orally 2 times a day  midodrine 10 mg oral tablet: 3 tab(s) orally every 8 hours  pantoprazole 40 mg intravenous injection: 40 milligram(s) intravenous once a day  polyethylene glycol 3350 oral powder for reconstitution: 17 gram(s) orally every 12 hours

## 2020-12-10 NOTE — PROGRESS NOTE ADULT - ASSESSMENT
`60 year old F w/ pmh of HTN, DVT on Xarelto, AARON who presented w/ fevers found to have COVI w/ superimposed PNA and bacteremia complicated by septic shock and ATN requiring CRRT and hypoxic resp failure requiring intubation.     Neuro  - Pt now only on precedex at 0.3   - Continue Ketamine at .75 and Fentanyl 1    Resp  Acute hypoxic resp failure 2/2 covid PNA  - Currently on 40% FiO2 and O2 sat > 93%.    CV  - Hyperdynamic LV, keep net even   - ST has resolved    Hx of DVt on Xarelto  - Continue heparin gtt, lowered to 1300 now w/ therapeutic ptt  - CT negative for PE    GI  - Continue TF, tolerating well  - Continue bowel regimen, last BM 12/3.       - No negro in place, bladder  not distended on CT    Renal  ARF 2/2 ATN in the setting of septic shock  - Continue CRRT, keep net even.   - Discussed the central lines with attending and will leave in place for now.     ID  Covid 19  - Completed remdesivir, dexamethasone discontinued in setting of bacteremia  - + Sputum MSSA , + bacteremia P mirablis . CTA revealing cavitary consolidation. Will need > 6 weeks of antibiotics.   - WBC downtrended from 46 to 36  - Continue cefazolin and Levofloxacin ( Day 4 ) .  - caspofungin discontinued, no fungal growth.   - BC from 12/1 NGTD, Sputum cx Strenotrophemonas sensitive to Levofloxacin    Citlali Low, DNP `60 year old F w/ pmh of HTN, DVT on Xarelto, AARON who presented w/ fevers found to have COVI w/ superimposed PNA and bacteremia complicated by septic shock and ATN requiring CRRT and hypoxic resp failure requiring intubation.     Neuro  - Pt now only on precedex at 0.3   - Pt has + gag, spontaneous blinking but not moving extremities. No response to painful stimuli. Last Headt CT on 12.5 is negative. Pt may be encephalopathic due to rising BUN?. Will obtain Head CT after CRRT if pt doesn't respond.     Resp  Acute hypoxic resp failure 2/2 covid PNA  - Currently on 30% FiO2 and O2 sat > 93%  - Pressure support trails daily, pt overbreathing the vent. Will need a mental status for extubation.     CV  - Hyperdynamic LV, keep net even. Overall + 770 , will start CRRT for aim net even  - ST has resolved  - Continue Midodrine at 30 TID, off pressors    Hx of DVt on Xarelto  - Continue heparin gtt, currently therapeutic at 1200  - CT negative for PE    GI  - Continue TF, tolerating well  - Continue bowel regimen, last BM 12/7      - UO 40-60 hour w/ negro in place    Renal  ARF 2/2 ATN in the setting of septic shock  - Pt has not responded to Bumex challenge and making 40-60 an hour w/ rising BUN/CR  - Will start CCVHD today to keep net even.     ID  Covid 19  - Completed remdesivir, dexamethasone discontinued in setting of bacteremia  - + Sputum MSSA , + bacteremia P mirablis . CTA revealing cavitary consolidation. Will need > 6 weeks of antibiotics. Continue Cefazolin for MSSA bacteremia.   - WBC downtrended now 10  - Levofloxacin completed.   - caspofungin discontinued, no fungal growth.   - BC and sputum  from 12/7 NGTD    Dispo  - Pt being transferred to [facility] MICU due to rising COVID ICU admissions. Will update Family    Citlali Low, DNP

## 2020-12-10 NOTE — H&P ADULT - ATTENDING COMMENTS
Agree with above. Seen and examined with team on rounds. Critically ill requiring frequent bedside visits. COVID PNA with severe lung disease. HD as tolerated. Supportive care.

## 2020-12-10 NOTE — PROGRESS NOTE ADULT - NUTRITIONAL ASSESSMENT
This patient has been assessed with a concern for Malnutrition and has been determined to have a diagnosis/diagnoses of Morbid obesity/BMI > 40.      
This patient has been assessed with a concern for Malnutrition and has been determined to have a diagnosis/diagnoses of Morbid obesity/BMI > 40.    This patient is being managed with:   Diet NPO with Tube Feed-  Tube Feeding Modality: Orogastric  Nepro with Carb Steady (NEPRORTH)  Total Volume for 24 Hours (mL): 240  Continuous  Starting Tube Feed Rate {mL per Hour}: 10  Increase Tube Feed Rate by (mL): 5     Every 4 hours  Until Goal Tube Feed Rate (mL per Hour): 10  Tube Feed Duration (in Hours): 24  Tube Feed Start Time: 12:00  No Carb Prosource TF     Qty per Day:  4  Entered: Nov 30 2020  4:24AM    
This patient has been assessed with a concern for Malnutrition and has been determined to have a diagnosis/diagnoses of Morbid obesity/BMI > 40.    This patient is being managed with:   Diet NPO with Tube Feed-  Tube Feeding Modality: Orogastric  Nepro with Carb Steady (NEPRORTH)  Total Volume for 24 Hours (mL): 600  Continuous  Starting Tube Feed Rate {mL per Hour}: 15  Increase Tube Feed Rate by (mL): 10     Every 6 hours  Until Goal Tube Feed Rate (mL per Hour): 25  Tube Feed Duration (in Hours): 24  Tube Feed Start Time: 12:00  No Carb Prosource TF     Qty per Day:  4  Entered: Dec  3 2020 12:17PM    
This patient has been assessed with a concern for Malnutrition and has been determined to have a diagnosis/diagnoses of Morbid obesity/BMI > 40.    This patient is being managed with:   Diet NPO with Tube Feed-  Tube Feeding Modality: Orogastric  Nepro with Carb Steady (NEPRORTH)  Total Volume for 24 Hours (mL): 720  Continuous  Starting Tube Feed Rate {mL per Hour}: 15  Increase Tube Feed Rate by (mL): 10     Every 6 hours  Until Goal Tube Feed Rate (mL per Hour): 30  Tube Feed Duration (in Hours): 24  Tube Feed Start Time: 12:00  No Carb Prosource (1pkg = 15gms Protein)     Qty per Day:  2  Entered: Dec  7 2020 11:03AM    
This patient has been assessed with a concern for Malnutrition and has been determined to have a diagnosis/diagnoses of Morbid obesity/BMI > 40.    This patient is being managed with:   Diet NPO with Tube Feed-  Tube Feeding Modality: Orogastric  Vital AF (VITALAFRTH)  Total Volume for 24 Hours (mL): 480  Continuous  Starting Tube Feed Rate {mL per Hour}: 20  Until Goal Tube Feed Rate (mL per Hour): 20  Tube Feed Duration (in Hours): 24  Tube Feed Start Time: 11:00  No Carb Prosource (1pkg = 15gms Protein)     Qty per Day:  4  Entered: Nov 25 2020 10:05AM    
This patient has been assessed with a concern for Malnutrition and has been determined to have a diagnosis/diagnoses of Morbid obesity/BMI > 40.    This patient is being managed with:   Diet NPO with Tube Feed-  Tube Feeding Modality: Orogastric  Vital AF (VITALAFRTH)  Total Volume for 24 Hours (mL): 480  Continuous  Starting Tube Feed Rate {mL per Hour}: 20  Until Goal Tube Feed Rate (mL per Hour): 20  Tube Feed Duration (in Hours): 24  Tube Feed Start Time: 11:00  No Carb Prosource (1pkg = 15gms Protein)     Qty per Day:  4  Entered: Nov 25 2020 10:05AM    
This patient has been assessed with a concern for Malnutrition and has been determined to have a diagnosis/diagnoses of Morbid obesity/BMI > 40.    This patient is being managed with:   Diet NPO with Tube Feed-  Tube Feeding Modality: Orogastric  Nepro with Carb Steady (NEPRORTH)  Total Volume for 24 Hours (mL): 240  Continuous  Starting Tube Feed Rate {mL per Hour}: 10  Increase Tube Feed Rate by (mL): 5     Every 4 hours  Until Goal Tube Feed Rate (mL per Hour): 10  Tube Feed Duration (in Hours): 24  Tube Feed Start Time: 12:00  No Carb Prosource TF     Qty per Day:  4  Entered: Nov 30 2020  4:24AM    
This patient has been assessed with a concern for Malnutrition and has been determined to have a diagnosis/diagnoses of Morbid obesity/BMI > 40.    This patient is being managed with:   Diet NPO with Tube Feed-  Tube Feeding Modality: Orogastric  Nepro with Carb Steady (NEPRORTH)  Total Volume for 24 Hours (mL): 360  Continuous  Starting Tube Feed Rate {mL per Hour}: 10  Increase Tube Feed Rate by (mL): 5     Every 4 hours  Until Goal Tube Feed Rate (mL per Hour): 15  Tube Feed Duration (in Hours): 24  Tube Feed Start Time: 12:00  No Carb Prosource TF     Qty per Day:  4  Entered: Nov 27 2020 11:03AM    
This patient has been assessed with a concern for Malnutrition and has been determined to have a diagnosis/diagnoses of Morbid obesity/BMI > 40.    This patient is being managed with:   Diet NPO with Tube Feed-  Tube Feeding Modality: Orogastric  Nepro with Carb Steady (NEPRORTH)  Total Volume for 24 Hours (mL): 600  Continuous  Starting Tube Feed Rate {mL per Hour}: 15  Increase Tube Feed Rate by (mL): 10     Every 6 hours  Until Goal Tube Feed Rate (mL per Hour): 25  Tube Feed Duration (in Hours): 24  Tube Feed Start Time: 12:00  No Carb Prosource TF     Qty per Day:  4  Entered: Dec  3 2020 12:17PM    
This patient has been assessed with a concern for Malnutrition and has been determined to have a diagnosis/diagnoses of Morbid obesity/BMI > 40.    This patient is being managed with:   Diet NPO with Tube Feed-  Tube Feeding Modality: Orogastric  Nepro with Carb Steady (NEPRORTH)  Total Volume for 24 Hours (mL): 720  Continuous  Starting Tube Feed Rate {mL per Hour}: 15  Increase Tube Feed Rate by (mL): 10     Every 6 hours  Until Goal Tube Feed Rate (mL per Hour): 30  Tube Feed Duration (in Hours): 24  Tube Feed Start Time: 12:00  No Carb Prosource (1pkg = 15gms Protein)     Qty per Day:  2  Entered: Dec  7 2020 11:03AM    
This patient has been assessed with a concern for Malnutrition and has been determined to have a diagnosis/diagnoses of Morbid obesity/BMI > 40.    This patient is being managed with:   Diet NPO with Tube Feed-  Tube Feeding Modality: Orogastric  Nepro with Carb Steady (NEPRORTH)  Total Volume for 24 Hours (mL): 720  Continuous  Starting Tube Feed Rate {mL per Hour}: 20  Increase Tube Feed Rate by (mL): 10     Every 4 hours  Until Goal Tube Feed Rate (mL per Hour): 30  Tube Feed Duration (in Hours): 24  Tube Feed Start Time: 07:15  Entered: Nov 26 2020  7:13AM    
This patient has been assessed with a concern for Malnutrition and has been determined to have a diagnosis/diagnoses of Morbid obesity/BMI > 40.      
This patient has been assessed with a concern for Malnutrition and has been determined to have a diagnosis/diagnoses of Morbid obesity/BMI > 40.    This patient is being managed with:   Diet NPO with Tube Feed-  Tube Feeding Modality: Orogastric  Glucerna 1.2 Pelon (GLUCERNARTH)  Total Volume for 24 Hours (mL): 240  Continuous  Starting Tube Feed Rate {mL per Hour}: 5  Increase Tube Feed Rate by (mL): 5     Every 2 hours  Until Goal Tube Feed Rate (mL per Hour): 10  Tube Feed Duration (in Hours): 24  Tube Feed Start Time: 15:00  Entered: Nov 23 2020  2:48PM    
This patient has been assessed with a concern for Malnutrition and has been determined to have a diagnosis/diagnoses of Morbid obesity/BMI > 40.    This patient is being managed with:   Diet NPO with Tube Feed-  Tube Feeding Modality: Orogastric  Nepro with Carb Steady (NEPRORTH)  Total Volume for 24 Hours (mL): 360  Continuous  Starting Tube Feed Rate {mL per Hour}: 10  Increase Tube Feed Rate by (mL): 5     Every 4 hours  Until Goal Tube Feed Rate (mL per Hour): 15  Tube Feed Duration (in Hours): 24  Tube Feed Start Time: 12:00  No Carb Prosource TF     Qty per Day:  4  Entered: Nov 27 2020 11:03AM    
This patient has been assessed with a concern for Malnutrition and has been determined to have a diagnosis/diagnoses of Morbid obesity/BMI > 40.    This patient is being managed with:   Diet NPO with Tube Feed-  Tube Feeding Modality: Orogastric  Nepro with Carb Steady (NEPRORTH)  Total Volume for 24 Hours (mL): 240  Continuous  Starting Tube Feed Rate {mL per Hour}: 10  Increase Tube Feed Rate by (mL): 5     Every 4 hours  Until Goal Tube Feed Rate (mL per Hour): 10  Tube Feed Duration (in Hours): 24  Tube Feed Start Time: 12:00  No Carb Prosource TF     Qty per Day:  4  Entered: Nov 30 2020  4:24AM    
This patient has been assessed with a concern for Malnutrition and has been determined to have a diagnosis/diagnoses of Morbid obesity/BMI > 40.    This patient is being managed with:   Diet NPO with Tube Feed-  Tube Feeding Modality: Orogastric  Nepro with Carb Steady (NEPRORTH)  Total Volume for 24 Hours (mL): 720  Continuous  Starting Tube Feed Rate {mL per Hour}: 15  Increase Tube Feed Rate by (mL): 10     Every 6 hours  Until Goal Tube Feed Rate (mL per Hour): 30  Tube Feed Duration (in Hours): 24  Tube Feed Start Time: 12:00  No Carb Prosource (1pkg = 15gms Protein)     Qty per Day:  2  Entered: Dec  7 2020 11:03AM    
This patient has been assessed with a concern for Malnutrition and has been determined to have a diagnosis/diagnoses of Morbid obesity/BMI > 40.    This patient is being managed with:   Diet NPO with Tube Feed-  Tube Feeding Modality: Orogastric  Nepro with Carb Steady (NEPRORTH)  Total Volume for 24 Hours (mL): 360  Continuous  Starting Tube Feed Rate {mL per Hour}: 10  Increase Tube Feed Rate by (mL): 5     Every 4 hours  Until Goal Tube Feed Rate (mL per Hour): 15  Tube Feed Duration (in Hours): 24  Tube Feed Start Time: 12:00  No Carb Prosource TF     Qty per Day:  4  Entered: Nov 27 2020 11:03AM    
This patient has been assessed with a concern for Malnutrition and has been determined to have a diagnosis/diagnoses of Morbid obesity/BMI > 40.    This patient is being managed with:   Diet NPO with Tube Feed-  Tube Feeding Modality: Orogastric  Nepro with Carb Steady (NEPRORTH)  Total Volume for 24 Hours (mL): 360  Continuous  Starting Tube Feed Rate {mL per Hour}: 10  Increase Tube Feed Rate by (mL): 5     Every 4 hours  Until Goal Tube Feed Rate (mL per Hour): 15  Tube Feed Duration (in Hours): 24  Tube Feed Start Time: 12:00  No Carb Prosource TF     Qty per Day:  4  Entered: Nov 27 2020 11:03AM    
This patient has been assessed with a concern for Malnutrition and has been determined to have a diagnosis/diagnoses of Morbid obesity/BMI > 40.    This patient is being managed with:   Diet NPO with Tube Feed-  Tube Feeding Modality: Orogastric  Nepro with Carb Steady (NEPRORTH)  Total Volume for 24 Hours (mL): 720  Continuous  Starting Tube Feed Rate {mL per Hour}: 15  Increase Tube Feed Rate by (mL): 10     Every 6 hours  Until Goal Tube Feed Rate (mL per Hour): 30  Tube Feed Duration (in Hours): 24  Tube Feed Start Time: 12:00  No Carb Prosource (1pkg = 15gms Protein)     Qty per Day:  2  Entered: Dec  7 2020 11:03AM    
This patient has been assessed with a concern for Malnutrition and has been determined to have a diagnosis/diagnoses of Morbid obesity/BMI > 40.    This patient is being managed with:   Diet Regular-  Consistent Carbohydrate {Evening Snack} (CSTCHOSN)  DASH/TLC {Sodium & Cholesterol Restricted} (DASH)  Entered: Nov 18 2020  6:22AM    
This patient has been assessed with a concern for Malnutrition and has been determined to have a diagnosis/diagnoses of Morbid obesity/BMI > 40.    This patient is being managed with:   Diet Regular-  Consistent Carbohydrate {Evening Snack} (CSTCHOSN)  DASH/TLC {Sodium & Cholesterol Restricted} (DASH)  Entered: Nov 18 2020  6:22AM

## 2020-12-10 NOTE — PROGRESS NOTE ADULT - SUBJECTIVE AND OBJECTIVE BOX
Medicine Progress Note    Patient is a 60y old  Female who presents with a chief complaint of Acute hypoxemic respiratory failure / covid19 (09 Dec 2020 12:24)      SUBJECTIVE / OVERNIGHT EVENTS:    ADDITIONAL REVIEW OF SYSTEMS:    MEDICATIONS  (STANDING):  ALBUTerol    90 MICROgram(s) HFA Inhaler 4 Puff(s) Inhalation every 6 hours  BACItracin   Ointment 1 Application(s) Topical two times a day  ceFAZolin   IVPB 2000 milliGRAM(s) IV Intermittent every 24 hours  chlorhexidine 0.12% Liquid 15 milliLiter(s) Oral Mucosa every 12 hours  chlorhexidine 4% Liquid 1 Application(s) Topical <User Schedule>  chlorhexidine 4% Liquid 1 Application(s) Topical <User Schedule>  dexMEDEtomidine Infusion 0.2 MICROgram(s)/kG/Hr (6.81 mL/Hr) IV Continuous <Continuous>  dextrose 40% Gel 15 Gram(s) Oral once  dextrose 5%. 1000 milliLiter(s) (50 mL/Hr) IV Continuous <Continuous>  dextrose 5%. 1000 milliLiter(s) (100 mL/Hr) IV Continuous <Continuous>  dextrose 50% Injectable 25 Gram(s) IV Push once  dextrose 50% Injectable 12.5 Gram(s) IV Push once  dextrose 50% Injectable 25 Gram(s) IV Push once  glucagon  Injectable 1 milliGRAM(s) IntraMuscular once  heparin  Infusion 1300 Unit(s)/Hr (12 mL/Hr) IV Continuous <Continuous>  insulin lispro (ADMELOG) corrective regimen sliding scale   SubCutaneous every 6 hours  insulin NPH human recombinant 10 Unit(s) SubCutaneous every 6 hours  ipratropium 17 MICROgram(s) HFA Inhaler 2 Puff(s) Inhalation every 6 hours  lactulose Syrup 15 Gram(s) Oral two times a day  midodrine 30 milliGRAM(s) Oral every 8 hours  nystatin Powder 1 Application(s) Topical two times a day  pantoprazole  Injectable 40 milliGRAM(s) IV Push daily  petrolatum Ophthalmic Ointment 1 Application(s) Both EYES two times a day  polyethylene glycol 3350 17 Gram(s) Oral every 12 hours    MEDICATIONS  (PRN):  acetaminophen    Suspension .. 650 milliGRAM(s) Oral every 6 hours PRN Temp greater or equal to 38C (100.4F)  sodium chloride 0.9% lock flush 10 milliLiter(s) IV Push every 1 hour PRN Pre/post blood products, medications, blood draw, and to maintain line patency    CAPILLARY BLOOD GLUCOSE      POCT Blood Glucose.: 188 mg/dL (10 Dec 2020 05:02)  POCT Blood Glucose.: 179 mg/dL (09 Dec 2020 23:14)  POCT Blood Glucose.: 167 mg/dL (09 Dec 2020 18:18)  POCT Blood Glucose.: 188 mg/dL (09 Dec 2020 12:12)    I&O's Summary    09 Dec 2020 07:01  -  10 Dec 2020 07:00  --------------------------------------------------------  IN: 1292 mL / OUT: 775 mL / NET: 517 mL        PHYSICAL EXAM:  Vital Signs Last 24 Hrs  T(C): 35.8 (10 Dec 2020 06:00), Max: 36.9 (09 Dec 2020 14:00)  T(F): 96.4 (10 Dec 2020 06:00), Max: 98.4 (09 Dec 2020 14:00)  HR: 92 (10 Dec 2020 06:00) (68 - 113)  BP: 113/53 (10 Dec 2020 06:00) (104/42 - 147/68)  BP(mean): 76 (10 Dec 2020 06:00) (61 - 98)  RR: 37 (10 Dec 2020 06:00) (8 - 38)  SpO2: 95% (10 Dec 2020 06:00) (91% - 100%)  CONSTITUTIONAL: NAD, well-developed, well-groomed  ENMT: Moist oral mucosa, no pharyngeal injection or exudates; normal dentition  RESPIRATORY: Normal respiratory effort; lungs are clear to auscultation bilaterally  CARDIOVASCULAR: Regular rate and rhythm, normal S1 and S2, no murmur/rub/gallop; No lower extremity edema; Peripheral pulses are 2+ bilaterally  ABDOMEN: Nontender to palpation, normoactive bowel sounds, no rebound/guarding; No hepatosplenomegaly  PSYCH: A+O to person, place, and time; affect appropriate  NEUROLOGY: CN 2-12 are intact and symmetric; no gross sensory deficits   SKIN: No rashes; no palpable lesions    LABS:                        8.6    10.64 )-----------( 157      ( 10 Dec 2020 01:44 )             27.7     12-10    133<L>  |  96  |  68<H>  ----------------------------<  228<H>  4.9   |  16<L>  |  3.49<H>    Ca    8.9      10 Dec 2020 01:51  Phos  5.5     12-10  Mg     2.1     12-10    TPro  6.8  /  Alb  2.5<L>  /  TBili  0.8  /  DBili  x   /  AST  64<H>  /  ALT  <5<L>  /  AlkPhos  98  12-10    PT/INR - ( 09 Dec 2020 02:07 )   PT: 17.9 sec;   INR: 1.52 ratio         PTT - ( 10 Dec 2020 04:42 )  PTT:55.0 sec      Urinalysis Basic - ( 08 Dec 2020 18:04 )    Color: Yellow / Appearance: Turbid / S.013 / pH: x  Gluc: x / Ketone: Negative  / Bili: Negative / Urobili: <2 mg/dL   Blood: x / Protein: 30 mg/dL / Nitrite: Negative   Leuk Esterase: Large / RBC: 209 /HPF /  /HPF   Sq Epi: x / Non Sq Epi: 6 /HPF / Bacteria: Negative        Culture - Urine (collected 09 Dec 2020 04:33)  Source: .Urine Catheterized  Final Report (09 Dec 2020 23:22):    No growth      COVID-19 PCR: Detected (01 Dec 2020 17:41)  SARS-CoV-2: Detected (2020 22:56)      RADIOLOGY & ADDITIONAL TESTS:  Imaging from Last 24 Hours:    Electrocardiogram/QTc Interval:    COORDINATION OF CARE:  Care Discussed with Consultants/Other Providers:   Medicine Progress Note/ Transfer Note to [facility] COVID    60F w/pmhx AARON, HTN, 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 Reports that her  and son live with her and they have tested so far negative for covid and are asymptomatic, patient is not sure where was exposed. Reports trying her symbicort this week for shortness of breath without much relief. Patient yesterday began to have fevers with highest fever at 104F which alarmed her and for that reason she came to the hospital for help  (2020 05:46). Pt found to be COVID + and completed remdesivir -. On  pt became hypoxic w/ increasign WOB and was intubated for airway protection and transferred to the [facility] ICU. The patient was also proned that night for 18 hours. Hosp course was complicated by ATN which was unresponsive to bumex challenge. On  the pt was consented for CVVHDF. Her course was also complicated by MSSA/P. mirablis bacteremia potentially due to urine vs line-associated and MSSA in sputum due to PNA. Pt on antibiotics and antifungals for WBC in the 40s.  On  Pt proned 20 hours with improved in pO2. Now supine and FiO2 reduced to 60%. On  pt went to CTA C/A/P due to persistently elevated WBCs in the setting of being on antibiotics and found to have a large cavitary consolidation in the lung.CTA Chest showed large cavitary consolidation which is likely source of elevated WBCs. FiO2 reduced to 40% and pt is doing well. Versed titrated down    SUBJECTIVE / OVERNIGHT EVENTS:    ADDITIONAL REVIEW OF SYSTEMS:    MEDICATIONS  (STANDING):  ALBUTerol    90 MICROgram(s) HFA Inhaler 4 Puff(s) Inhalation every 6 hours  BACItracin   Ointment 1 Application(s) Topical two times a day  ceFAZolin   IVPB 2000 milliGRAM(s) IV Intermittent every 24 hours  chlorhexidine 0.12% Liquid 15 milliLiter(s) Oral Mucosa every 12 hours  chlorhexidine 4% Liquid 1 Application(s) Topical <User Schedule>  chlorhexidine 4% Liquid 1 Application(s) Topical <User Schedule>  dexMEDEtomidine Infusion 0.2 MICROgram(s)/kG/Hr (6.81 mL/Hr) IV Continuous <Continuous>  dextrose 40% Gel 15 Gram(s) Oral once  dextrose 5%. 1000 milliLiter(s) (50 mL/Hr) IV Continuous <Continuous>  dextrose 5%. 1000 milliLiter(s) (100 mL/Hr) IV Continuous <Continuous>  dextrose 50% Injectable 25 Gram(s) IV Push once  dextrose 50% Injectable 12.5 Gram(s) IV Push once  dextrose 50% Injectable 25 Gram(s) IV Push once  glucagon  Injectable 1 milliGRAM(s) IntraMuscular once  heparin  Infusion 1300 Unit(s)/Hr (12 mL/Hr) IV Continuous <Continuous>  insulin lispro (ADMELOG) corrective regimen sliding scale   SubCutaneous every 6 hours  insulin NPH human recombinant 10 Unit(s) SubCutaneous every 6 hours  ipratropium 17 MICROgram(s) HFA Inhaler 2 Puff(s) Inhalation every 6 hours  lactulose Syrup 15 Gram(s) Oral two times a day  midodrine 30 milliGRAM(s) Oral every 8 hours  nystatin Powder 1 Application(s) Topical two times a day  pantoprazole  Injectable 40 milliGRAM(s) IV Push daily  petrolatum Ophthalmic Ointment 1 Application(s) Both EYES two times a day  polyethylene glycol 3350 17 Gram(s) Oral every 12 hours    MEDICATIONS  (PRN):  acetaminophen    Suspension .. 650 milliGRAM(s) Oral every 6 hours PRN Temp greater or equal to 38C (100.4F)  sodium chloride 0.9% lock flush 10 milliLiter(s) IV Push every 1 hour PRN Pre/post blood products, medications, blood draw, and to maintain line patency    CAPILLARY BLOOD GLUCOSE      POCT Blood Glucose.: 188 mg/dL (10 Dec 2020 05:02)  POCT Blood Glucose.: 179 mg/dL (09 Dec 2020 23:14)  POCT Blood Glucose.: 167 mg/dL (09 Dec 2020 18:18)  POCT Blood Glucose.: 188 mg/dL (09 Dec 2020 12:12)    I&O's Summary    09 Dec 2020 07:01  -  10 Dec 2020 07:00  --------------------------------------------------------  IN: 1292 mL / OUT: 775 mL / NET: 517 mL        PHYSICAL EXAM:  Vital Signs Last 24 Hrs  T(C): 35.8 (10 Dec 2020 06:00), Max: 36.9 (09 Dec 2020 14:00)  T(F): 96.4 (10 Dec 2020 06:00), Max: 98.4 (09 Dec 2020 14:00)  HR: 92 (10 Dec 2020 06:00) (68 - 113)  BP: 113/53 (10 Dec 2020 06:00) (104/42 - 147/68)  BP(mean): 76 (10 Dec 2020 06:00) (61 - 98)  RR: 37 (10 Dec 2020 06:00) (8 - 38)  SpO2: 95% (10 Dec 2020 06:00) (91% - 100%)  CONSTITUTIONAL: NAD, well-developed, well-groomed  ENMT: Moist oral mucosa, no pharyngeal injection or exudates; normal dentition  RESPIRATORY: Normal respiratory effort; lungs are clear to auscultation bilaterally  CARDIOVASCULAR: Regular rate and rhythm, normal S1 and S2, no murmur/rub/gallop; No lower extremity edema; Peripheral pulses are 2+ bilaterally  ABDOMEN: Nontender to palpation, normoactive bowel sounds, no rebound/guarding; No hepatosplenomegaly  PSYCH: A+O to person, place, and time; affect appropriate  NEUROLOGY: CN 2-12 are intact and symmetric; no gross sensory deficits   SKIN: No rashes; no palpable lesions    LABS:                        8.6    10.64 )-----------( 157      ( 10 Dec 2020 01:44 )             27.7     12-10    133<L>  |  96  |  68<H>  ----------------------------<  228<H>  4.9   |  16<L>  |  3.49<H>    Ca    8.9      10 Dec 2020 01:51  Phos  5.5     12-10  Mg     2.1     12-10    TPro  6.8  /  Alb  2.5<L>  /  TBili  0.8  /  DBili  x   /  AST  64<H>  /  ALT  <5<L>  /  AlkPhos  98  12-10    PT/INR - ( 09 Dec 2020 02:07 )   PT: 17.9 sec;   INR: 1.52 ratio         PTT - ( 10 Dec 2020 04:42 )  PTT:55.0 sec      Urinalysis Basic - ( 08 Dec 2020 18:04 )    Color: Yellow / Appearance: Turbid / S.013 / pH: x  Gluc: x / Ketone: Negative  / Bili: Negative / Urobili: <2 mg/dL   Blood: x / Protein: 30 mg/dL / Nitrite: Negative   Leuk Esterase: Large / RBC: 209 /HPF /  /HPF   Sq Epi: x / Non Sq Epi: 6 /HPF / Bacteria: Negative        Culture - Urine (collected 09 Dec 2020 04:33)  Source: .Urine Catheterized  Final Report (09 Dec 2020 23:22):    No growth      COVID-19 PCR: Detected (01 Dec 2020 17:41)  SARS-CoV-2: Detected (2020 22:56)      RADIOLOGY & ADDITIONAL TESTS:  Imaging from Last 24 Hours:    Electrocardiogram/QTc Interval:    COORDINATION OF CARE:  Care Discussed with Consultants/Other Providers:   Medicine Progress Note/ Transfer Note to [facility] COVID    60F w/pmhx AARON, HTN, 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 Reports that her  and son live with her and they have tested so far negative for covid and are asymptomatic, patient is not sure where was exposed. Reports trying her symbicort this week for shortness of breath without much relief. Patient yesterday began to have fevers with highest fever at 104F which alarmed her and for that reason she came to the hospital for help  (2020 05:46). Pt found to be COVID + and completed remdesivir -. On  pt became hypoxic w/ increasign WOB and was intubated for airway protection and transferred to the [facility] ICU. The patient was also proned that night for 18 hours. Hosp course was complicated by ATN which was unresponsive to bumex challenge. On  the pt was consented for CVVHDF. Her course was also complicated by MSSA/P. mirablis bacteremia potentially due to urine vs line-associated and MSSA in sputum due to PNA. Pt on antibiotics and antifungals for WBC in the 40s.  On  Pt proned 20 hours with improved in pO2. Now supine and FiO2 reduced to 60%. On  pt went to CTA C/A/P due to persistently elevated WBCs in the setting of being on antibiotics and found to have a large cavitary consolidation in the lung.CTA Chest showed large cavitary consolidation which is likely source of elevated WBCs. FiO2 reduced to 40% and pt is doing well. Versed titrated down. On  HD was stopped however her creatinine has been increasing and she has made no improvement with a Bumex gtt. In addition she is not making any improvement in her mental status.     SUBJECTIVE / OVERNIGHT EVENTS: No overnight events.     ADDITIONAL REVIEW OF SYSTEMS: Un able to attain at this time , pt is     MEDICATIONS  (STANDING):  ALBUTerol    90 MICROgram(s) HFA Inhaler 4 Puff(s) Inhalation every 6 hours  BACItracin   Ointment 1 Application(s) Topical two times a day  ceFAZolin   IVPB 2000 milliGRAM(s) IV Intermittent every 24 hours  chlorhexidine 0.12% Liquid 15 milliLiter(s) Oral Mucosa every 12 hours  chlorhexidine 4% Liquid 1 Application(s) Topical <User Schedule>  chlorhexidine 4% Liquid 1 Application(s) Topical <User Schedule>  dexMEDEtomidine Infusion 0.2 MICROgram(s)/kG/Hr (6.81 mL/Hr) IV Continuous <Continuous>  dextrose 40% Gel 15 Gram(s) Oral once  dextrose 5%. 1000 milliLiter(s) (50 mL/Hr) IV Continuous <Continuous>  dextrose 5%. 1000 milliLiter(s) (100 mL/Hr) IV Continuous <Continuous>  dextrose 50% Injectable 25 Gram(s) IV Push once  dextrose 50% Injectable 12.5 Gram(s) IV Push once  dextrose 50% Injectable 25 Gram(s) IV Push once  glucagon  Injectable 1 milliGRAM(s) IntraMuscular once  heparin  Infusion 1300 Unit(s)/Hr (12 mL/Hr) IV Continuous <Continuous>  insulin lispro (ADMELOG) corrective regimen sliding scale   SubCutaneous every 6 hours  insulin NPH human recombinant 10 Unit(s) SubCutaneous every 6 hours  ipratropium 17 MICROgram(s) HFA Inhaler 2 Puff(s) Inhalation every 6 hours  lactulose Syrup 15 Gram(s) Oral two times a day  midodrine 30 milliGRAM(s) Oral every 8 hours  nystatin Powder 1 Application(s) Topical two times a day  pantoprazole  Injectable 40 milliGRAM(s) IV Push daily  petrolatum Ophthalmic Ointment 1 Application(s) Both EYES two times a day  polyethylene glycol 3350 17 Gram(s) Oral every 12 hours    MEDICATIONS  (PRN):  acetaminophen    Suspension .. 650 milliGRAM(s) Oral every 6 hours PRN Temp greater or equal to 38C (100.4F)  sodium chloride 0.9% lock flush 10 milliLiter(s) IV Push every 1 hour PRN Pre/post blood products, medications, blood draw, and to maintain line patency    CAPILLARY BLOOD GLUCOSE      POCT Blood Glucose.: 188 mg/dL (10 Dec 2020 05:02)  POCT Blood Glucose.: 179 mg/dL (09 Dec 2020 23:14)  POCT Blood Glucose.: 167 mg/dL (09 Dec 2020 18:18)  POCT Blood Glucose.: 188 mg/dL (09 Dec 2020 12:12)    I&O's Summary    09 Dec 2020 07:01  -  10 Dec 2020 07:00  --------------------------------------------------------  IN: 1292 mL / OUT: 775 mL / NET: 517 mL        PHYSICAL EXAM:  Vital Signs Last 24 Hrs  T(C): 35.8 (10 Dec 2020 06:00), Max: 36.9 (09 Dec 2020 14:00)  T(F): 96.4 (10 Dec 2020 06:00), Max: 98.4 (09 Dec 2020 14:00)  HR: 92 (10 Dec 2020 06:00) (68 - 113)  BP: 113/53 (10 Dec 2020 06:00) (104/42 - 147/68)  BP(mean): 76 (10 Dec 2020 06:00) (61 - 98)  RR: 37 (10 Dec 2020 06:00) (8 - 38)  SpO2: 95% (10 Dec 2020 06:00) (91% - 100%)  CONSTITUTIONAL: NAD, well-developed, well-groomed  ENMT: Moist oral mucosa, no pharyngeal injection or exudates; normal dentition  RESPIRATORY: Normal respiratory effort; lungs are clear to auscultation bilaterally  CARDIOVASCULAR: Regular rate and rhythm, normal S1 and S2, no murmur/rub/gallop; No lower extremity edema; Peripheral pulses are 2+ bilaterally  ABDOMEN: Nontender to palpation, normoactive bowel sounds, no rebound/guarding; No hepatosplenomegaly  PSYCH: A+O to person, place, and time; affect appropriate  NEUROLOGY: CN 2-12 are intact and symmetric; no gross sensory deficits   SKIN: No rashes; no palpable lesions    LABS:                        8.6    10.64 )-----------( 157      ( 10 Dec 2020 01:44 )             27.7     12-10    133<L>  |  96  |  68<H>  ----------------------------<  228<H>  4.9   |  16<L>  |  3.49<H>    Ca    8.9      10 Dec 2020 01:51  Phos  5.5     12-10  Mg     2.1     12-10    TPro  6.8  /  Alb  2.5<L>  /  TBili  0.8  /  DBili  x   /  AST  64<H>  /  ALT  <5<L>  /  AlkPhos  98  12-10    PT/INR - ( 09 Dec 2020 02:07 )   PT: 17.9 sec;   INR: 1.52 ratio         PTT - ( 10 Dec 2020 04:42 )  PTT:55.0 sec      Urinalysis Basic - ( 08 Dec 2020 18:04 )    Color: Yellow / Appearance: Turbid / S.013 / pH: x  Gluc: x / Ketone: Negative  / Bili: Negative / Urobili: <2 mg/dL   Blood: x / Protein: 30 mg/dL / Nitrite: Negative   Leuk Esterase: Large / RBC: 209 /HPF /  /HPF   Sq Epi: x / Non Sq Epi: 6 /HPF / Bacteria: Negative        Culture - Urine (collected 09 Dec 2020 04:33)  Source: .Urine Catheterized  Final Report (09 Dec 2020 23:22):    No growth      COVID-19 PCR: Detected (01 Dec 2020 17:41)  SARS-CoV-2: Detected (2020 22:56)      RADIOLOGY & ADDITIONAL TESTS:  Imaging from Last 24 Hours:    Electrocardiogram/QTc Interval:    COORDINATION OF CARE:  Care Discussed with Consultants/Other Providers:   Medicine Progress Note/ Transfer Note to [facility] COVID    60F w/pmhx AARON, HTN, 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 Reports that her  and son live with her and they have tested so far negative for covid and are asymptomatic, patient is not sure where was exposed. Reports trying her symbicort this week for shortness of breath without much relief. Patient yesterday began to have fevers with highest fever at 104F which alarmed her and for that reason she came to the hospital for help  (2020 05:46). Pt found to be COVID + and completed remdesivir -. On  pt became hypoxic w/ increasign WOB and was intubated for airway protection and transferred to the [facility] ICU. The patient was also proned that night for 18 hours. Hosp course was complicated by ATN which was unresponsive to bumex challenge. On  the pt was consented for CVVHDF. Her course was also complicated by MSSA/P. mirablis bacteremia potentially due to urine vs line-associated and MSSA in sputum due to PNA. Pt on antibiotics and antifungals for WBC in the 40s.  On  Pt proned 20 hours with improved in pO2. Now supine and FiO2 reduced to 60%. On  pt went to CTA C/A/P due to persistently elevated WBCs in the setting of being on antibiotics and found to have a large cavitary consolidation in the lung.CTA Chest showed large cavitary consolidation which is likely source of elevated WBCs. FiO2 reduced to 40% and pt is doing well. Versed titrated down. On  HD was stopped however her creatinine has been increasing and she has made no improvement with a Bumex gtt. In addition she is not making any improvement in her mental status.     SUBJECTIVE / OVERNIGHT EVENTS: No overnight events.     ADDITIONAL REVIEW OF SYSTEMS: Unable to attain at this time , pt is sedated and paralyzed.     MEDICATIONS  (STANDING):  ALBUTerol    90 MICROgram(s) HFA Inhaler 4 Puff(s) Inhalation every 6 hours  BACItracin   Ointment 1 Application(s) Topical two times a day  ceFAZolin   IVPB 2000 milliGRAM(s) IV Intermittent every 24 hours  chlorhexidine 0.12% Liquid 15 milliLiter(s) Oral Mucosa every 12 hours  chlorhexidine 4% Liquid 1 Application(s) Topical <User Schedule>  chlorhexidine 4% Liquid 1 Application(s) Topical <User Schedule>  dexMEDEtomidine Infusion 0.2 MICROgram(s)/kG/Hr (6.81 mL/Hr) IV Continuous <Continuous>  dextrose 40% Gel 15 Gram(s) Oral once  dextrose 5%. 1000 milliLiter(s) (50 mL/Hr) IV Continuous <Continuous>  dextrose 5%. 1000 milliLiter(s) (100 mL/Hr) IV Continuous <Continuous>  dextrose 50% Injectable 25 Gram(s) IV Push once  dextrose 50% Injectable 12.5 Gram(s) IV Push once  dextrose 50% Injectable 25 Gram(s) IV Push once  glucagon  Injectable 1 milliGRAM(s) IntraMuscular once  heparin  Infusion 1300 Unit(s)/Hr (12 mL/Hr) IV Continuous <Continuous>  insulin lispro (ADMELOG) corrective regimen sliding scale   SubCutaneous every 6 hours  insulin NPH human recombinant 10 Unit(s) SubCutaneous every 6 hours  ipratropium 17 MICROgram(s) HFA Inhaler 2 Puff(s) Inhalation every 6 hours  lactulose Syrup 15 Gram(s) Oral two times a day  midodrine 30 milliGRAM(s) Oral every 8 hours  nystatin Powder 1 Application(s) Topical two times a day  pantoprazole  Injectable 40 milliGRAM(s) IV Push daily  petrolatum Ophthalmic Ointment 1 Application(s) Both EYES two times a day  polyethylene glycol 3350 17 Gram(s) Oral every 12 hours    MEDICATIONS  (PRN):  acetaminophen    Suspension .. 650 milliGRAM(s) Oral every 6 hours PRN Temp greater or equal to 38C (100.4F)  sodium chloride 0.9% lock flush 10 milliLiter(s) IV Push every 1 hour PRN Pre/post blood products, medications, blood draw, and to maintain line patency    CAPILLARY BLOOD GLUCOSE    POCT Blood Glucose.: 188 mg/dL (10 Dec 2020 05:02)  POCT Blood Glucose.: 179 mg/dL (09 Dec 2020 23:14)  POCT Blood Glucose.: 167 mg/dL (09 Dec 2020 18:18)  POCT Blood Glucose.: 188 mg/dL (09 Dec 2020 12:12)    I&O's Summary    09 Dec 2020 07:01  -  10 Dec 2020 07:00  --------------------------------------------------------  IN: 1292 mL / OUT: 775 mL / NET: 517 mL    PHYSICAL EXAM:  Vital Signs Last 24 Hrs  T(C): 35.8 (10 Dec 2020 06:00), Max: 36.9 (09 Dec 2020 14:00)  T(F): 96.4 (10 Dec 2020 06:00), Max: 98.4 (09 Dec 2020 14:00)  HR: 92 (10 Dec 2020 06:00) (68 - 113)  BP: 113/53 (10 Dec 2020 06:00) (104/42 - 147/68)  BP(mean): 76 (10 Dec 2020 06:00) (61 - 98)  RR: 37 (10 Dec 2020 06:00) (8 - 38)  SpO2: 95% (10 Dec 2020 06:00) (91% - 100%)  CONSTITUTIONAL: NAD, well-developed, well-groomed  ENMT: Moist oral mucosa, no pharyngeal injection or exudates; normal dentition  RESPIRATORY: Normal respiratory effort; lungs are clear to auscultation bilaterally  CARDIOVASCULAR: Regular rate and rhythm, normal S1 and S2, no murmur/rub/gallop; No lower extremity edema; Peripheral pulses are 2+ bilaterally  ABDOMEN: Nontender to palpation, normoactive bowel sounds, no rebound/guarding; No hepatosplenomegaly  PSYCH: A+O to person, place, and time; affect appropriate  NEUROLOGY: CN 2-12 are intact and symmetric; no gross sensory deficits   SKIN: No rashes; no palpable lesions    LABS:                        8.6    10.64 )-----------( 157      ( 10 Dec 2020 01:44 )             27.7     12-10    133<L>  |  96  |  68<H>  ----------------------------<  228<H>  4.9   |  16<L>  |  3.49<H>    Ca    8.9      10 Dec 2020 01:51  Phos  5.5     12-10  Mg     2.1     12-10    TPro  6.8  /  Alb  2.5<L>  /  TBili  0.8  /  DBili  x   /  AST  64<H>  /  ALT  <5<L>  /  AlkPhos  98  12-10    PT/INR - ( 09 Dec 2020 02:07 )   PT: 17.9 sec;   INR: 1.52 ratio      PTT - ( 10 Dec 2020 04:42 )  PTT:55.0 sec    Urinalysis Basic - ( 08 Dec 2020 18:04 )    Color: Yellow / Appearance: Turbid / S.013 / pH: x  Gluc: x / Ketone: Negative  / Bili: Negative / Urobili: <2 mg/dL   Blood: x / Protein: 30 mg/dL / Nitrite: Negative   Leuk Esterase: Large / RBC: 209 /HPF /  /HPF   Sq Epi: x / Non Sq Epi: 6 /HPF / Bacteria: Negative    Culture - Urine (collected 09 Dec 2020 04:33)  Source: .Urine Catheterized  Final Report (09 Dec 2020 23:22):    No growth    COVID-19 PCR: Detected (01 Dec 2020 17:41)  SARS-CoV-2: Detected (2020 22:56)    RADIOLOGY & ADDITIONAL TESTS:`caRADIOLOGY & ADDITIONAL TESTS:  Imaging from Last 24 Hours: < from: CT Chest w/ IV Cont (20 @ 22:02) >  IMPRESSION:  New large consolidation with areas of cavitation in the right lung. Multifocal pneumonia with cavitation is a consideration.    No acute intra-abdominal pathology.    Electrocardiogram/QTc Interval: cElectrocardiogram/QTc Interval:  QTc 460    COORDINATION OF CARE:  Care Discussed with Consultants/Other Providers: Care discussed with infectious disease, nephrology

## 2020-12-10 NOTE — H&P ADULT - HISTORY OF PRESENT ILLNESS
Patient is a 59yo F w/ AARON, peritonitis s/p Oral's procedure and ileostomy (reversed 2011), HTN, prior DVT/PE, ?Asthma admitted on 11/18/2020 to [facility] after presenting for  productive cough  w/ SOB x9 days and diarrhea x7 days and fever x1 day. Patient was found to be COVID + on 11/17/2020 and completed remdesivir 11/18-11/22. Patient was intubated for airway protection on 11/23/2020. Patient has required proning (last 12/4/2020). Hospital course c/b by ATN, requiring CVVHD now on intermittent HD, unresponsive to bumex challenge last HD 12/8. Course also c/b Stenotrophomonas (12/11, completed Levaquin x7 days), MSSA/P. mirablis bacteremia (on Cefazolin, potentially due to urine vs line-associated and MSSA in sputum due to PNA), also with lung abscesses on CT chest on 12/5.  Patient currently on Volume AC 30/350/8/35% on Precedex. Patient intermittently opens eyes but is not yet responsive. [facility]    Patient is a 59yo F w/ AARON, peritonitis s/p Oral's procedure and ileostomy (reversed 2011), HTN, prior DVT/PE, ?Asthma admitted on 11/18/2020 to [facility] after presenting for  productive cough  w/ SOB x9 days and diarrhea x7 days and fever x1 day. Patient was found to be COVID + on 11/17/2020 and completed remdesivir 11/18-11/22. Patient was intubated for airway protection on 11/23/2020. Patient has required proning (last 12/4/2020). Hospital course c/b by ATN, requiring CVVHD now on intermittent HD, unresponsive to bumex challenge last HD 12/8. Course also c/b Stenotrophomonas (12/11, completed Levaquin x7 days), MSSA/P. mirablis bacteremia (on Cefazolin, potentially due to urine vs line-associated and MSSA in sputum due to PNA), also with lung abscesses on CT chest on 12/5.  Patient currently on Volume AC 30/350/8/35% on Precedex. Patient intermittently opens eyes but is not yet responsive. Transferred over to [facility] on 12/10 to offload [facility] COVID ICU

## 2020-12-10 NOTE — H&P ADULT - NSHPPHYSICALEXAM_GEN_ALL_CORE
Physical Exam:   Constitutional: NAD, well-groomed, well-developed  HEENT: + PERRLA, EOMI, no drainage or redness  Neck: supple,  No JVD  Respiratory: Ventilator assisted breath Sounds equal & clear bilaterally to auscultation, no accessory muscle use noted  Cardiovascular: Regular rate, regular rhythm, normal S1, S2; no murmurs or rub  Gastrointestinal: Soft, non-tender, non distended, no hepatosplenomegaly, normal bowel sounds  Extremities: + 2 peripheral edema, no cyanosis, no clubbing   Vascular: Equal and normal pulses: 2+ peripheral pulses throughout  Neurological: sedated/intubated  Psychiatric: calm, normal mood, normal affect  Musculoskeletal: No joint swelling or deformity; no limitation of movement  Skin: warm, dry, well perfused, no rashes Physical Exam:   Constitutional: NAD, well-groomed, well-developed  HEENT: + PERRLA, EOMI, no drainage or redness  Neck: supple,  No JVD  Respiratory: Ventilator assisted breath Sounds equal & clear bilaterally to auscultation, no accessory muscle use noted  Cardiovascular: Regular rate, regular rhythm, normal S1, S2; no murmurs or rub  Gastrointestinal: Soft, non-tender, non distended, no hepatosplenomegaly, normal bowel sounds  Extremities: + 2 peripheral edema, no cyanosis, no clubbing   Vascular: Equal and normal pulses: 2+ peripheral pulses throughout  Neurological: sedated/intubated  Psychiatric: calm, normal mood, normal affect  Musculoskeletal: No joint swelling or deformity; no limitation of movement  Skin: pressure ulcer to chin, warm, dry, well perfused, no rashes

## 2020-12-10 NOTE — PROGRESS NOTE ADULT - REASON FOR ADMISSION
Acute hypoxemic respiratory failure 2/2 covid19

## 2020-12-11 DIAGNOSIS — N17.9 ACUTE KIDNEY FAILURE, UNSPECIFIED: ICD-10-CM

## 2020-12-11 LAB
ADD ON TEST-SPECIMEN IN LAB: SIGNIFICANT CHANGE UP
ALBUMIN SERPL ELPH-MCNC: 2.5 G/DL — LOW (ref 3.3–5)
ALBUMIN SERPL ELPH-MCNC: 2.5 G/DL — LOW (ref 3.3–5)
ALBUMIN SERPL ELPH-MCNC: 2.6 G/DL — LOW (ref 3.3–5)
ALP SERPL-CCNC: 111 U/L — SIGNIFICANT CHANGE UP (ref 40–120)
ALP SERPL-CCNC: 91 U/L — SIGNIFICANT CHANGE UP (ref 40–120)
ALP SERPL-CCNC: 93 U/L — SIGNIFICANT CHANGE UP (ref 40–120)
ALT FLD-CCNC: 5 U/L — SIGNIFICANT CHANGE UP (ref 4–33)
ALT FLD-CCNC: <5 U/L — LOW (ref 4–33)
ALT FLD-CCNC: <5 U/L — LOW (ref 4–33)
ANION GAP SERPL CALC-SCNC: 17 MMOL/L — HIGH (ref 7–14)
ANION GAP SERPL CALC-SCNC: 17 MMOL/L — HIGH (ref 7–14)
ANION GAP SERPL CALC-SCNC: 18 MMOL/L — HIGH (ref 7–14)
ANION GAP SERPL CALC-SCNC: 21 MMOL/L — HIGH (ref 7–14)
APPEARANCE UR: ABNORMAL
APTT BLD: 174 SEC — SIGNIFICANT CHANGE UP (ref 27–36.3)
APTT BLD: 37.2 SEC — HIGH (ref 27–36.3)
APTT BLD: 87.8 SEC — HIGH (ref 27–36.3)
AST SERPL-CCNC: 28 U/L — SIGNIFICANT CHANGE UP (ref 4–32)
AST SERPL-CCNC: 30 U/L — SIGNIFICANT CHANGE UP (ref 4–32)
AST SERPL-CCNC: 57 U/L — HIGH (ref 4–32)
BACTERIA # UR AUTO: ABNORMAL
BASOPHILS # BLD AUTO: 0.07 K/UL — SIGNIFICANT CHANGE UP (ref 0–0.2)
BASOPHILS NFR BLD AUTO: 0.9 % — SIGNIFICANT CHANGE UP (ref 0–2)
BILIRUB SERPL-MCNC: 0.8 MG/DL — SIGNIFICANT CHANGE UP (ref 0.2–1.2)
BILIRUB SERPL-MCNC: 1 MG/DL — SIGNIFICANT CHANGE UP (ref 0.2–1.2)
BILIRUB SERPL-MCNC: 1.1 MG/DL — SIGNIFICANT CHANGE UP (ref 0.2–1.2)
BILIRUB UR-MCNC: NEGATIVE — SIGNIFICANT CHANGE UP
BLOOD GAS ARTERIAL - LYTES,HGB,ICA,LACT RESULT: SIGNIFICANT CHANGE UP
BLOOD GAS ARTERIAL COMPREHENSIVE RESULT: SIGNIFICANT CHANGE UP
BUN SERPL-MCNC: 36 MG/DL — HIGH (ref 7–23)
BUN SERPL-MCNC: 41 MG/DL — HIGH (ref 7–23)
BUN SERPL-MCNC: 78 MG/DL — HIGH (ref 7–23)
BUN SERPL-MCNC: 80 MG/DL — HIGH (ref 7–23)
CALCIUM SERPL-MCNC: 8.5 MG/DL — SIGNIFICANT CHANGE UP (ref 8.4–10.5)
CALCIUM SERPL-MCNC: 8.8 MG/DL — SIGNIFICANT CHANGE UP (ref 8.4–10.5)
CALCIUM SERPL-MCNC: 9.6 MG/DL — SIGNIFICANT CHANGE UP (ref 8.4–10.5)
CALCIUM SERPL-MCNC: 9.8 MG/DL — SIGNIFICANT CHANGE UP (ref 8.4–10.5)
CHLORIDE SERPL-SCNC: 93 MMOL/L — LOW (ref 98–107)
CHLORIDE SERPL-SCNC: 93 MMOL/L — LOW (ref 98–107)
CHLORIDE SERPL-SCNC: 95 MMOL/L — LOW (ref 98–107)
CHLORIDE SERPL-SCNC: 96 MMOL/L — LOW (ref 98–107)
CO2 SERPL-SCNC: 14 MMOL/L — LOW (ref 22–31)
CO2 SERPL-SCNC: 18 MMOL/L — LOW (ref 22–31)
CO2 SERPL-SCNC: 21 MMOL/L — LOW (ref 22–31)
CO2 SERPL-SCNC: 23 MMOL/L — SIGNIFICANT CHANGE UP (ref 22–31)
COLOR SPEC: YELLOW — SIGNIFICANT CHANGE UP
CREAT SERPL-MCNC: 2.46 MG/DL — HIGH (ref 0.5–1.3)
CREAT SERPL-MCNC: 2.52 MG/DL — HIGH (ref 0.5–1.3)
CREAT SERPL-MCNC: 4.3 MG/DL — HIGH (ref 0.5–1.3)
CREAT SERPL-MCNC: 4.35 MG/DL — HIGH (ref 0.5–1.3)
DIFF PNL FLD: ABNORMAL
EOSINOPHIL # BLD AUTO: 0.34 K/UL — SIGNIFICANT CHANGE UP (ref 0–0.5)
EOSINOPHIL NFR BLD AUTO: 4.5 % — SIGNIFICANT CHANGE UP (ref 0–6)
EPI CELLS # UR: SIGNIFICANT CHANGE UP
GLUCOSE BLDC GLUCOMTR-MCNC: 118 MG/DL — HIGH (ref 70–99)
GLUCOSE BLDC GLUCOMTR-MCNC: 199 MG/DL — HIGH (ref 70–99)
GLUCOSE BLDC GLUCOMTR-MCNC: 229 MG/DL — HIGH (ref 70–99)
GLUCOSE BLDC GLUCOMTR-MCNC: 293 MG/DL — HIGH (ref 70–99)
GLUCOSE SERPL-MCNC: 109 MG/DL — HIGH (ref 70–99)
GLUCOSE SERPL-MCNC: 212 MG/DL — HIGH (ref 70–99)
GLUCOSE SERPL-MCNC: 224 MG/DL — HIGH (ref 70–99)
GLUCOSE SERPL-MCNC: 272 MG/DL — HIGH (ref 70–99)
GLUCOSE UR QL: NEGATIVE — SIGNIFICANT CHANGE UP
HCT VFR BLD CALC: 24.9 % — LOW (ref 34.5–45)
HCT VFR BLD CALC: 28.6 % — LOW (ref 34.5–45)
HGB BLD-MCNC: 8 G/DL — LOW (ref 11.5–15.5)
HGB BLD-MCNC: 8.9 G/DL — LOW (ref 11.5–15.5)
HYALINE CASTS # UR AUTO: SIGNIFICANT CHANGE UP /LPF (ref 0–7)
IANC: 4.6 K/UL — SIGNIFICANT CHANGE UP (ref 1.5–8.5)
INR BLD: 1.49 RATIO — HIGH (ref 0.88–1.17)
INR BLD: 1.78 RATIO — HIGH (ref 0.88–1.17)
KETONES UR-MCNC: NEGATIVE — SIGNIFICANT CHANGE UP
LEUKOCYTE ESTERASE UR-ACNC: ABNORMAL
LYMPHOCYTES # BLD AUTO: 0.81 K/UL — LOW (ref 1–3.3)
LYMPHOCYTES # BLD AUTO: 10.7 % — LOW (ref 13–44)
MAGNESIUM SERPL-MCNC: 1.9 MG/DL — SIGNIFICANT CHANGE UP (ref 1.6–2.6)
MAGNESIUM SERPL-MCNC: 2.1 MG/DL — SIGNIFICANT CHANGE UP (ref 1.6–2.6)
MCHC RBC-ENTMCNC: 29.1 PG — SIGNIFICANT CHANGE UP (ref 27–34)
MCHC RBC-ENTMCNC: 29.4 PG — SIGNIFICANT CHANGE UP (ref 27–34)
MCHC RBC-ENTMCNC: 31.1 GM/DL — LOW (ref 32–36)
MCHC RBC-ENTMCNC: 32.1 GM/DL — SIGNIFICANT CHANGE UP (ref 32–36)
MCV RBC AUTO: 91.5 FL — SIGNIFICANT CHANGE UP (ref 80–100)
MCV RBC AUTO: 93.5 FL — SIGNIFICANT CHANGE UP (ref 80–100)
MONOCYTES # BLD AUTO: 0.74 K/UL — SIGNIFICANT CHANGE UP (ref 0–0.9)
MONOCYTES NFR BLD AUTO: 9.8 % — SIGNIFICANT CHANGE UP (ref 2–14)
NEUTROPHILS # BLD AUTO: 5.32 K/UL — SIGNIFICANT CHANGE UP (ref 1.8–7.4)
NEUTROPHILS NFR BLD AUTO: 69.6 % — SIGNIFICANT CHANGE UP (ref 43–77)
NITRITE UR-MCNC: NEGATIVE — SIGNIFICANT CHANGE UP
NRBC # BLD: 0 /100 WBCS — SIGNIFICANT CHANGE UP
NRBC # FLD: 0.1 K/UL — HIGH
PH UR: 5.5 — SIGNIFICANT CHANGE UP (ref 5–8)
PHOSPHATE SERPL-MCNC: 3.5 MG/DL — SIGNIFICANT CHANGE UP (ref 2.5–4.5)
PHOSPHATE SERPL-MCNC: 5.4 MG/DL — HIGH (ref 2.5–4.5)
PLATELET # BLD AUTO: 167 K/UL — SIGNIFICANT CHANGE UP (ref 150–400)
PLATELET # BLD AUTO: 229 K/UL — SIGNIFICANT CHANGE UP (ref 150–400)
POTASSIUM SERPL-MCNC: 3.5 MMOL/L — SIGNIFICANT CHANGE UP (ref 3.5–5.3)
POTASSIUM SERPL-MCNC: 3.6 MMOL/L — SIGNIFICANT CHANGE UP (ref 3.5–5.3)
POTASSIUM SERPL-MCNC: 4.9 MMOL/L — SIGNIFICANT CHANGE UP (ref 3.5–5.3)
POTASSIUM SERPL-MCNC: 5.6 MMOL/L — HIGH (ref 3.5–5.3)
POTASSIUM SERPL-SCNC: 3.5 MMOL/L — SIGNIFICANT CHANGE UP (ref 3.5–5.3)
POTASSIUM SERPL-SCNC: 3.6 MMOL/L — SIGNIFICANT CHANGE UP (ref 3.5–5.3)
POTASSIUM SERPL-SCNC: 4.9 MMOL/L — SIGNIFICANT CHANGE UP (ref 3.5–5.3)
POTASSIUM SERPL-SCNC: 5.6 MMOL/L — HIGH (ref 3.5–5.3)
PROT SERPL-MCNC: 6.5 G/DL — SIGNIFICANT CHANGE UP (ref 6–8.3)
PROT SERPL-MCNC: 6.5 G/DL — SIGNIFICANT CHANGE UP (ref 6–8.3)
PROT SERPL-MCNC: 7.3 G/DL — SIGNIFICANT CHANGE UP (ref 6–8.3)
PROT UR-MCNC: ABNORMAL
PROTHROM AB SERPL-ACNC: 16.7 SEC — HIGH (ref 9.8–13.1)
PROTHROM AB SERPL-ACNC: 19.7 SEC — HIGH (ref 9.8–13.1)
RBC # BLD: 2.72 M/UL — LOW (ref 3.8–5.2)
RBC # BLD: 3.06 M/UL — LOW (ref 3.8–5.2)
RBC # FLD: 21.9 % — HIGH (ref 10.3–14.5)
RBC # FLD: 22.2 % — HIGH (ref 10.3–14.5)
RBC CASTS # UR COMP ASSIST: >50 /HPF — HIGH (ref 0–4)
SARS-COV-2 RNA SPEC QL NAA+PROBE: SIGNIFICANT CHANGE UP
SODIUM SERPL-SCNC: 128 MMOL/L — LOW (ref 135–145)
SODIUM SERPL-SCNC: 129 MMOL/L — LOW (ref 135–145)
SODIUM SERPL-SCNC: 134 MMOL/L — LOW (ref 135–145)
SODIUM SERPL-SCNC: 135 MMOL/L — SIGNIFICANT CHANGE UP (ref 135–145)
SP GR SPEC: 1.02 — SIGNIFICANT CHANGE UP (ref 1.01–1.02)
TROPONIN T, HIGH SENSITIVITY RESULT: 49 NG/L — SIGNIFICANT CHANGE UP
UROBILINOGEN FLD QL: SIGNIFICANT CHANGE UP
VANCOMYCIN FLD-MCNC: 11.9 UG/ML — SIGNIFICANT CHANGE UP
WBC # BLD: 14.95 K/UL — HIGH (ref 3.8–10.5)
WBC # BLD: 7.55 K/UL — SIGNIFICANT CHANGE UP (ref 3.8–10.5)
WBC # FLD AUTO: 14.95 K/UL — HIGH (ref 3.8–10.5)
WBC # FLD AUTO: 7.55 K/UL — SIGNIFICANT CHANGE UP (ref 3.8–10.5)
WBC UR QL: SIGNIFICANT CHANGE UP /HPF (ref 0–5)

## 2020-12-11 PROCEDURE — 99291 CRITICAL CARE FIRST HOUR: CPT

## 2020-12-11 PROCEDURE — 99233 SBSQ HOSP IP/OBS HIGH 50: CPT | Mod: GC

## 2020-12-11 RX ORDER — DEXTROSE 50 % IN WATER 50 %
50 SYRINGE (ML) INTRAVENOUS ONCE
Refills: 0 | Status: COMPLETED | OUTPATIENT
Start: 2020-12-11 | End: 2020-12-11

## 2020-12-11 RX ORDER — MEROPENEM 1 G/30ML
500 INJECTION INTRAVENOUS EVERY 12 HOURS
Refills: 0 | Status: DISCONTINUED | OUTPATIENT
Start: 2020-12-11 | End: 2020-12-11

## 2020-12-11 RX ORDER — NOREPINEPHRINE BITARTRATE/D5W 8 MG/250ML
0.5 PLASTIC BAG, INJECTION (ML) INTRAVENOUS
Qty: 8 | Refills: 0 | Status: DISCONTINUED | OUTPATIENT
Start: 2020-12-11 | End: 2020-12-12

## 2020-12-11 RX ORDER — SODIUM BICARBONATE 1 MEQ/ML
50 SYRINGE (ML) INTRAVENOUS ONCE
Refills: 0 | Status: COMPLETED | OUTPATIENT
Start: 2020-12-11 | End: 2020-12-11

## 2020-12-11 RX ORDER — FENTANYL CITRATE 50 UG/ML
0.5 INJECTION INTRAVENOUS
Qty: 5000 | Refills: 0 | Status: DISCONTINUED | OUTPATIENT
Start: 2020-12-11 | End: 2020-12-11

## 2020-12-11 RX ORDER — VANCOMYCIN HCL 1 G
1000 VIAL (EA) INTRAVENOUS ONCE
Refills: 0 | Status: COMPLETED | OUTPATIENT
Start: 2020-12-11 | End: 2020-12-11

## 2020-12-11 RX ORDER — FENTANYL CITRATE 50 UG/ML
0.5 INJECTION INTRAVENOUS
Qty: 2500 | Refills: 0 | Status: DISCONTINUED | OUTPATIENT
Start: 2020-12-11 | End: 2020-12-11

## 2020-12-11 RX ORDER — ACETAMINOPHEN 500 MG
1000 TABLET ORAL ONCE
Refills: 0 | Status: COMPLETED | OUTPATIENT
Start: 2020-12-11 | End: 2020-12-11

## 2020-12-11 RX ORDER — HEPARIN SODIUM 5000 [USP'U]/ML
1300 INJECTION INTRAVENOUS; SUBCUTANEOUS
Qty: 25000 | Refills: 0 | Status: DISCONTINUED | OUTPATIENT
Start: 2020-12-11 | End: 2020-12-12

## 2020-12-11 RX ORDER — FENTANYL CITRATE 50 UG/ML
100 INJECTION INTRAVENOUS ONCE
Refills: 0 | Status: DISCONTINUED | OUTPATIENT
Start: 2020-12-11 | End: 2020-12-11

## 2020-12-11 RX ORDER — MEROPENEM 1 G/30ML
500 INJECTION INTRAVENOUS EVERY 24 HOURS
Refills: 0 | Status: DISCONTINUED | OUTPATIENT
Start: 2020-12-11 | End: 2020-12-16

## 2020-12-11 RX ORDER — FENTANYL CITRATE 50 UG/ML
0.5 INJECTION INTRAVENOUS
Qty: 2500 | Refills: 0 | Status: DISCONTINUED | OUTPATIENT
Start: 2020-12-11 | End: 2020-12-17

## 2020-12-11 RX ORDER — NOREPINEPHRINE BITARTRATE/D5W 8 MG/250ML
0.07 PLASTIC BAG, INJECTION (ML) INTRAVENOUS
Qty: 32 | Refills: 0 | Status: DISCONTINUED | OUTPATIENT
Start: 2020-12-11 | End: 2020-12-11

## 2020-12-11 RX ORDER — HEPARIN SODIUM 5000 [USP'U]/ML
800 INJECTION INTRAVENOUS; SUBCUTANEOUS
Qty: 25000 | Refills: 0 | Status: DISCONTINUED | OUTPATIENT
Start: 2020-12-11 | End: 2020-12-11

## 2020-12-11 RX ORDER — INSULIN HUMAN 100 [IU]/ML
5 INJECTION, SOLUTION SUBCUTANEOUS ONCE
Refills: 0 | Status: COMPLETED | OUTPATIENT
Start: 2020-12-11 | End: 2020-12-11

## 2020-12-11 RX ORDER — MEROPENEM 1 G/30ML
500 INJECTION INTRAVENOUS EVERY 24 HOURS
Refills: 0 | Status: DISCONTINUED | OUTPATIENT
Start: 2020-12-11 | End: 2020-12-11

## 2020-12-11 RX ORDER — SODIUM ZIRCONIUM CYCLOSILICATE 10 G/10G
5 POWDER, FOR SUSPENSION ORAL ONCE
Refills: 0 | Status: COMPLETED | OUTPATIENT
Start: 2020-12-11 | End: 2020-12-11

## 2020-12-11 RX ORDER — CALCIUM GLUCONATE 100 MG/ML
1 VIAL (ML) INTRAVENOUS ONCE
Refills: 0 | Status: COMPLETED | OUTPATIENT
Start: 2020-12-11 | End: 2020-12-11

## 2020-12-11 RX ADMIN — FENTANYL CITRATE 100 MICROGRAM(S): 50 INJECTION INTRAVENOUS at 09:00

## 2020-12-11 RX ADMIN — Medication 400 MILLIGRAM(S): at 04:10

## 2020-12-11 RX ADMIN — SENNA PLUS 15 MILLILITER(S): 8.6 TABLET ORAL at 05:11

## 2020-12-11 RX ADMIN — Medication 10.8 MICROGRAM(S)/KG/MIN: at 12:13

## 2020-12-11 RX ADMIN — SODIUM ZIRCONIUM CYCLOSILICATE 5 GRAM(S): 10 POWDER, FOR SUSPENSION ORAL at 15:54

## 2020-12-11 RX ADMIN — FENTANYL CITRATE 5.75 MICROGRAM(S)/KG/HR: 50 INJECTION INTRAVENOUS at 16:52

## 2020-12-11 RX ADMIN — Medication 1000 MILLIGRAM(S): at 13:17

## 2020-12-11 RX ADMIN — Medication 50 MILLIEQUIVALENT(S): at 16:56

## 2020-12-11 RX ADMIN — FENTANYL CITRATE 5.75 MICROGRAM(S)/KG/HR: 50 INJECTION INTRAVENOUS at 12:13

## 2020-12-11 RX ADMIN — MIDODRINE HYDROCHLORIDE 30 MILLIGRAM(S): 2.5 TABLET ORAL at 15:11

## 2020-12-11 RX ADMIN — Medication 10.8 MICROGRAM(S)/KG/MIN: at 15:08

## 2020-12-11 RX ADMIN — Medication 50 MILLIEQUIVALENT(S): at 17:10

## 2020-12-11 RX ADMIN — Medication 100 GRAM(S): at 12:14

## 2020-12-11 RX ADMIN — FENTANYL CITRATE 100 MICROGRAM(S): 50 INJECTION INTRAVENOUS at 03:32

## 2020-12-11 RX ADMIN — DEXMEDETOMIDINE HYDROCHLORIDE IN 0.9% SODIUM CHLORIDE 14.4 MICROGRAM(S)/KG/HR: 4 INJECTION INTRAVENOUS at 17:07

## 2020-12-11 RX ADMIN — DEXMEDETOMIDINE HYDROCHLORIDE IN 0.9% SODIUM CHLORIDE 14.4 MICROGRAM(S)/KG/HR: 4 INJECTION INTRAVENOUS at 10:02

## 2020-12-11 RX ADMIN — Medication 6: at 19:02

## 2020-12-11 RX ADMIN — CHLORHEXIDINE GLUCONATE 15 MILLILITER(S): 213 SOLUTION TOPICAL at 17:10

## 2020-12-11 RX ADMIN — Medication 50 MILLILITER(S): at 12:14

## 2020-12-11 RX ADMIN — Medication 10.8 MICROGRAM(S)/KG/MIN: at 13:00

## 2020-12-11 RX ADMIN — HEPARIN SODIUM 1200 UNIT(S)/HR: 5000 INJECTION INTRAVENOUS; SUBCUTANEOUS at 08:05

## 2020-12-11 RX ADMIN — FENTANYL CITRATE 100 MICROGRAM(S): 50 INJECTION INTRAVENOUS at 08:15

## 2020-12-11 RX ADMIN — HUMAN INSULIN 10 UNIT(S): 100 INJECTION, SUSPENSION SUBCUTANEOUS at 23:10

## 2020-12-11 RX ADMIN — HUMAN INSULIN 10 UNIT(S): 100 INJECTION, SUSPENSION SUBCUTANEOUS at 19:05

## 2020-12-11 RX ADMIN — INSULIN HUMAN 5 UNIT(S): 100 INJECTION, SOLUTION SUBCUTANEOUS at 15:08

## 2020-12-11 RX ADMIN — Medication 4: at 13:16

## 2020-12-11 RX ADMIN — Medication 1000 MILLIGRAM(S): at 05:15

## 2020-12-11 RX ADMIN — Medication 50 MILLIEQUIVALENT(S): at 19:05

## 2020-12-11 RX ADMIN — POLYETHYLENE GLYCOL 3350 17 GRAM(S): 17 POWDER, FOR SOLUTION ORAL at 12:14

## 2020-12-11 RX ADMIN — HUMAN INSULIN 10 UNIT(S): 100 INJECTION, SUSPENSION SUBCUTANEOUS at 12:14

## 2020-12-11 RX ADMIN — MIDODRINE HYDROCHLORIDE 30 MILLIGRAM(S): 2.5 TABLET ORAL at 23:07

## 2020-12-11 RX ADMIN — CHLORHEXIDINE GLUCONATE 15 MILLILITER(S): 213 SOLUTION TOPICAL at 05:11

## 2020-12-11 RX ADMIN — FENTANYL CITRATE 5.75 MICROGRAM(S)/KG/HR: 50 INJECTION INTRAVENOUS at 13:45

## 2020-12-11 RX ADMIN — HUMAN INSULIN 10 UNIT(S): 100 INJECTION, SUSPENSION SUBCUTANEOUS at 05:11

## 2020-12-11 RX ADMIN — Medication 10.8 MICROGRAM(S)/KG/MIN: at 17:07

## 2020-12-11 RX ADMIN — Medication 400 MILLIGRAM(S): at 22:30

## 2020-12-11 RX ADMIN — FENTANYL CITRATE 100 MICROGRAM(S): 50 INJECTION INTRAVENOUS at 03:00

## 2020-12-11 RX ADMIN — Medication 1000 MILLIGRAM(S): at 23:00

## 2020-12-11 RX ADMIN — CHLORHEXIDINE GLUCONATE 1 APPLICATION(S): 213 SOLUTION TOPICAL at 06:49

## 2020-12-11 RX ADMIN — MEROPENEM 100 MILLIGRAM(S): 1 INJECTION INTRAVENOUS at 15:10

## 2020-12-11 RX ADMIN — Medication 250 MILLIGRAM(S): at 15:10

## 2020-12-11 RX ADMIN — Medication 2: at 23:10

## 2020-12-11 RX ADMIN — Medication 400 MILLIGRAM(S): at 09:12

## 2020-12-11 NOTE — CHART NOTE - NSCHARTNOTEFT_GEN_A_CORE
Thoroughly reviewed risks and benefits of RRT/HD with patient's spouse. Patient's spouse (Mr Scotty Hamilton ) agrees for continuation of dialytic therapy. Consent has been obtained for HD/CRRT.     If any questions, please feel free to contact me     Moy Alegre  Nephrology Fellow  [facility] Pager: [phone]  [facility] Pager: [phone]

## 2020-12-11 NOTE — ADVANCED PRACTICE NURSE CONSULT - ASSESSMENT
Intubated (ETT) and sedated, bedbound, indwelling urethral catheter in place and incontinent of stool. OGT in place. Skin warm, dry with increased moisture in intertriginous folds, adequate skin turgor. Blanchable erythema on bilateral heels. Currently on high dose vasopressors and hemodynamically unstable with EKG changes at this time, unable to turn and assess sacral fold, as per bedside RN there is a fissure in sacral fold that is superficial exposing dermis.    Risk for moisture associated dermatitis of intertriginous folds: bilateral breast, pannus and bilateral groin with hyperpigmentation secondary to moisture exposure- no suspicion of candidiasis, skin intact, no erythema noted.    Chin extending to right mandible, Unstageable Pressure Injury entire area measures [id number], area of eschar on chin measures 1.5vim2pad6.2cm (unable to determine complete anatomical depth due to necrotic tissue). Borders are irregular. Within this entire area there is 50% soft, stable eschar that is firmly attached to wound edge and wound base (located on chin) and then 40% non-blanchable erythema onto right mandible and 10% serous filled blister. No induration, no increased warmth, no erythema in periwound skin (no signs of soft tissue infection). No drainage noted. Goal of care: monitor for changes in tissue type, maintain stable eschar at this time (no signs of soft tissue infection and patient critically ill, skin may not heal at this time).

## 2020-12-11 NOTE — PROGRESS NOTE ADULT - ASSESSMENT
This is a 60 year old Female w/ pmh of HTN, DVT on Xarelto, peritonitis s/p Oral's procedure and ileostomy (reversed 2011), AARON who presented to [facility] on 11/18  w/ fevers found to have COVID-19, intubated 11/23, course complicated by superimposed PNA and bacteremia with Stenotrophomonas (12/11, completed Levaquin x7 days), MSSA/P. mirablis bacteremia (on Cefazolin, potentially due to urine vs line-associated and MSSA in sputum due to PNA), also with lung abscesses on CT chest on 12/5 , ARTEMIO/ATN requiring CRRT and HD. Now unable to wean from ventilator and transferred from [facility] on 12/10 to offload COVID unit       #Neuro  Alert & oriented x3 @baseline.   - Sedated on Precedex gtt. Will wean as tolerated.  - Pt current mental status most likely encephalopathic from renal failure and uremia, if mental status doesn't improve after HD sessions may need CTH     #Resp  Acute hypoxemic respiratory failure 2/2 covid-19 PNA. Intubated on 11/23, now unable to wean from ventilator   - c/w AC 30/370/8/30%   - Pressure support trails daily, but may need tracheostomy if unable to wean     #CV  Hx of HTN, DVT on Xarelto not in shock state   - Continue Midodrine at 30 TID, off pressors currently  - Continue heparin gtt, currently therapeutic   - CT negative for PE    #GI  - Continue TF, tolerating well  - Continue bowel regimen    #  ARTEMIO 2/2 ATN requiring CRRT, then bumex challenge and now HD  -last HD 12/8, next HD scheduled for today 12/11  -renal following   -will trend electrolytes   - negro in place  -strict I’s and O’s    #ID  Covid 19 pneumonia, MSSA bacteremia/pneumonia, now cavitary consolidation on CT chest.  - s/p remdesivir and dexamethasone   - + Sputum/blood MSSA, + P mirablis in blood, +Stenotrophomonas in sputum   - Was previously on Cefazolin for MSSA bacteremia. Pt continues with fevers, ABX broaden to Fadia and Vanco on 12/11  - s/p Levofloxacin   - caspofungin discontinued, no fungal growth.   - Blood cx from 12/7 NGTD. Will follow up repeat cultures sent on 12/11    #Heme  -Anticoagulation with Heparin gtt     #Endo  - ISS q6hrs This is a 60 year old Female w/ pmh of HTN, DVT on Xarelto, peritonitis s/p Oral's procedure and ileostomy (reversed 2011), AARON who presented to [facility] on 11/18  w/ fevers found to have COVID-19, intubated 11/23, course complicated by superimposed PNA and bacteremia with Stenotrophomonas (12/11, completed Levaquin x7 days), MSSA/P. mirablis bacteremia (on Cefazolin, potentially due to urine vs line-associated and MSSA in sputum due to PNA), also with lung abscesses on CT chest on 12/5 , ARTEMIO/ATN requiring CRRT and HD. Now unable to wean from ventilator and transferred from [facility] on 12/10 to offload COVID unit       #Neuro  Alert & oriented x3 @baseline.   - Sedated on Precedex gtt. Will wean as tolerated.  - Pt current mental status most likely encephalopathic from renal failure and uremia, if mental status doesn't improve after HD sessions may need CTH     #Resp  Acute hypoxemic respiratory failure 2/2 covid-19 PNA. Intubated on 11/23, now unable to wean from ventilator   - c/w AC 30/370/8/30%   - Pressure support trails daily, but may need tracheostomy if unable to wean     #CV  Hx of HTN, DVT on Xarelto not in shock state   - Continue Midodrine at 30 TID, off pressors currently  - Continue heparin gtt, currently therapeutic   - CT negative for PE    #GI  - Continue TF, tolerating well  - Continue bowel regimen    #  ARTEMIO 2/2 ATN requiring CRRT, then bumex challenge and now HD  -last HD 12/8, next HD scheduled for today 12/11  -renal following   -will trend electrolytes   - negro in place  -strict I’s and O’s    #ID  Covid 19 pneumonia, MSSA bacteremia/pneumonia, now cavitary consolidation on CT chest.  - s/p remdesivir and dexamethasone   - + Sputum/blood MSSA, + P mirablis in blood, +Stenotrophomonas in sputum   - Was previously on Cefazolin for MSSA bacteremia. Pt continues with fevers, ABX broaden to Fadia and Vanco on 12/11  - s/p Levofloxacin   - caspofungin discontinued, no fungal growth.   - Blood cx from 12/7 NGTD. Will follow up repeat cultures sent on 12/11    #Heme  -Anticoagulation with Heparin gtt     #Endo  - ISS q6hrs  - NPH q6hrs

## 2020-12-11 NOTE — PROGRESS NOTE ADULT - SUBJECTIVE AND OBJECTIVE BOX
[facility] Division of Kidney Diseases & Hypertension  FOLLOW UP NOTE  [phone]--------------------------------------------------------------------------------    Chief Complaint: ARTEMIO in the setting of COVID-19    24 hour events/subjective: Pt. was seen and examined at bedside at bedside today in the CTICU. Pt. transferred from [facility] on 12/10/20. Pt. currently intubated and on low dose vasopressor to maintain MAP. Pt. remains non-oliguric. Last had HD on 12/8/20.    PAST HISTORY  --------------------------------------------------------------------------------  No significant changes to PMH, PSH, FHx, SHx, unless otherwise noted    ALLERGIES & MEDICATIONS  --------------------------------------------------------------------------------  Allergies    Kiwi (Unknown)  latex (Anaphylaxis)  latex (Unknown)  penicillin (Other)  penicillin (Unknown)  perfume  hives (Other)  potassium acetate (Other)  soap additives hives (Other)    Intolerances    Standing Inpatient Medications  ceFAZolin   IVPB 1000 milliGRAM(s) IV Intermittent every 24 hours  chlorhexidine 0.12% Liquid 15 milliLiter(s) Oral Mucosa every 12 hours  chlorhexidine 4% Liquid 1 Application(s) Topical <User Schedule>  dexMEDEtomidine Infusion 0.5 MICROgram(s)/kG/Hr IV Continuous <Continuous>  fentaNYL    Injectable 100 MICROGram(s) IV Push once  heparin  Infusion. 1200 Unit(s)/Hr IV Continuous <Continuous>  insulin lispro (ADMELOG) corrective regimen sliding scale   SubCutaneous every 6 hours  insulin NPH human recombinant 10 Unit(s) SubCutaneous every 6 hours  midodrine 30 milliGRAM(s) Oral every 8 hours  norepinephrine Infusion 0.05 MICROgram(s)/kG/Min IV Continuous <Continuous>  polyethylene glycol 3350 17 Gram(s) Oral daily  senna Syrup 15 milliLiter(s) Oral two times a day    REVIEW OF SYSTEMS  --------------------------------------------------------------------------------  Unable to obtain due to medical condition    VITALS/PHYSICAL EXAM  --------------------------------------------------------------------------------  T(C): 38.4 (12-11-20 @ 06:00), Max: 38.8 (12-11-20 @ 04:00)  HR: 108 (12-11-20 @ 08:15) (61 - 121)  BP: 99/38 (12-11-20 @ 08:15) (82/47 - 185/48)  RR: 38 (12-11-20 @ 08:15) (25 - 42)  SpO2: 95% (12-11-20 @ 08:15) (76% - 100%)  Wt(kg): --  Height (cm): 160 (12-10-20 @ 14:29)  Weight (kg): 115 (12-10-20 @ 14:29)  BMI (kg/m2): 44.9 (12-10-20 @ 14:29)  BSA (m2): 2.14 (12-10-20 @ 14:29)    12-10-20 @ 07:01  -  12-11-20 @ 07:00  --------------------------------------------------------  IN: 640.9 mL / OUT: 895 mL / NET: -254.1 mL    12-11-20 @ 07:01  -  12-11-20 @ 08:52  --------------------------------------------------------  IN: 49 mL / OUT: 25 mL / NET: 24 mL    Physical Exam:              Gen: Intubated  	HEENT: ETT  	Pulm: Fair air entry B/L  	CV: S1S2  	Abd: Soft, +BS   	Ext: No LE edema B/L  	Neuro: sedated   	Skin: Warm and dry  	Vascular access: Right IJ non-tunneled HD catheter    LABS/STUDIES  --------------------------------------------------------------------------------              8.0    7.55  >-----------<  167      [12-11-20 @ 04:46]              24.9     129  |  93  |  78  ----------------------------<  109      [12-11-20 @ 04:46]  4.9   |  18  |  4.30        Ca     9.8     [12-11-20 @ 04:46]      Mg     2.1     [12-11-20 @ 04:46]      Phos  5.4     [12-11-20 @ 04:46]    Creatinine Trend:  SCr 4.30 [12-11 @ 04:46]  SCr 3.49 [12-10 @ 01:51]  SCr 3.21 [12-09 @ 19:16]  SCr 2.57 [12-09 @ 02:07]  SCr 3.19 [12-08 @ 00:13] [facility] Division of Kidney Diseases & Hypertension  FOLLOW UP NOTE  [phone]--------------------------------------------------------------------------------    Chief Complaint: ARTEMIO in the setting of COVID-19, on RRT/dialysis.    24 hour events/subjective: Pt. was seen and examined at bedside at bedside today in the CTICU. Pt. transferred from [facility] on 12/10/20. Pt. currently intubated and on low dose vasopressor to maintain MAP. Pt. remains non-oliguric. Last had HD on 12/8/20.    PAST HISTORY  --------------------------------------------------------------------------------  No significant changes to PMH, PSH, FHx, SHx, unless otherwise noted    ALLERGIES & MEDICATIONS  --------------------------------------------------------------------------------  Allergies    Kiwi (Unknown)  latex (Anaphylaxis)  latex (Unknown)  penicillin (Other)  penicillin (Unknown)  perfume  hives (Other)  potassium acetate (Other)  soap additives hives (Other)    Intolerances    Standing Inpatient Medications  ceFAZolin   IVPB 1000 milliGRAM(s) IV Intermittent every 24 hours  chlorhexidine 0.12% Liquid 15 milliLiter(s) Oral Mucosa every 12 hours  chlorhexidine 4% Liquid 1 Application(s) Topical <User Schedule>  dexMEDEtomidine Infusion 0.5 MICROgram(s)/kG/Hr IV Continuous <Continuous>  fentaNYL    Injectable 100 MICROGram(s) IV Push once  heparin  Infusion. 1200 Unit(s)/Hr IV Continuous <Continuous>  insulin lispro (ADMELOG) corrective regimen sliding scale   SubCutaneous every 6 hours  insulin NPH human recombinant 10 Unit(s) SubCutaneous every 6 hours  midodrine 30 milliGRAM(s) Oral every 8 hours  norepinephrine Infusion 0.05 MICROgram(s)/kG/Min IV Continuous <Continuous>  polyethylene glycol 3350 17 Gram(s) Oral daily  senna Syrup 15 milliLiter(s) Oral two times a day    REVIEW OF SYSTEMS  --------------------------------------------------------------------------------  Unable to obtain due to medical condition    VITALS/PHYSICAL EXAM  --------------------------------------------------------------------------------  T(C): 38.4 (12-11-20 @ 06:00), Max: 38.8 (12-11-20 @ 04:00)  HR: 108 (12-11-20 @ 08:15) (61 - 121)  BP: 99/38 (12-11-20 @ 08:15) (82/47 - 185/48)  RR: 38 (12-11-20 @ 08:15) (25 - 42)  SpO2: 95% (12-11-20 @ 08:15) (76% - 100%)  Wt(kg): --  Height (cm): 160 (12-10-20 @ 14:29)  Weight (kg): 115 (12-10-20 @ 14:29)  BMI (kg/m2): 44.9 (12-10-20 @ 14:29)  BSA (m2): 2.14 (12-10-20 @ 14:29)    12-10-20 @ 07:01  -  12-11-20 @ 07:00  --------------------------------------------------------  IN: 640.9 mL / OUT: 895 mL / NET: -254.1 mL    12-11-20 @ 07:01  -  12-11-20 @ 08:52  --------------------------------------------------------  IN: 49 mL / OUT: 25 mL / NET: 24 mL    Physical Exam:              Gen: Intubated  	HEENT: ETT  	Pulm: Fair air entry B/L  	CV: S1S2+  	Abd: Soft, +BS   	Ext: No LE edema B/L  	Neuro: sedated   	Skin: Warm and dry  	Vascular access: Right IJ non-tunneled HD catheter    LABS/STUDIES  --------------------------------------------------------------------------------              8.0    7.55  >-----------<  167      [12-11-20 @ 04:46]              24.9     129  |  93  |  78  ----------------------------<  109      [12-11-20 @ 04:46]  4.9   |  18  |  4.30        Ca     9.8     [12-11-20 @ 04:46]      Mg     2.1     [12-11-20 @ 04:46]      Phos  5.4     [12-11-20 @ 04:46]    Creatinine Trend:  SCr 4.30 [12-11 @ 04:46]  SCr 3.49 [12-10 @ 01:51]  SCr 3.21 [12-09 @ 19:16]  SCr 2.57 [12-09 @ 02:07]  SCr 3.19 [12-08 @ 00:13]

## 2020-12-11 NOTE — ADVANCED PRACTICE NURSE CONSULT - RECOMMEDATIONS
Recommend nutrition consult, patient with unstageble pressure injury.    Chin: Apply Betadine to eschar, allow to dry. Daily.    Sacral fold: Cleanse with skin cleanser, pat dry. Apply TRIAD paste twice a day and PRN.    B/L heels: Apply Liquid barrier film. Daily.    Intertriginous folds: Place Interdry textile sheeting, remove to wash & dry affected area, then replace. Individual sheeting may be used for up to 5 days unless soiled.     Continue low air loss bed therapy, continue heel elevation, continue to turn & reposition q2h, continue moisture management with barrier creams & single breathable pad, continue measures to decrease friction/shear/pressure.    Please call wound care service line is further assistance is needed ([phone]). Recommend nutrition consult, patient with unstageble pressure injury.    Chin: Apply Betadine to eschar, allow to dry. Daily.    Sacral fold: Cleanse with skin cleanser, pat dry. Apply Critic-aid clear ointment twice a day and PRN.    B/L heels: Apply Liquid barrier film. Daily.    Intertriginous folds: Place Interdry textile sheeting, remove to wash & dry affected area, then replace. Individual sheeting may be used for up to 5 days unless soiled.     Continue low air loss bed therapy, continue heel elevation, continue to turn & reposition q2h, continue moisture management with barrier creams & single breathable pad, continue measures to decrease friction/shear/pressure.    Please call wound care service line is further assistance is needed ([phone]). Recommend nutrition consult, patient with unstageble pressure injury.    Chin: Apply Betadine to eschar, allow to dry. Daily.    Sacral fold: Cleanse with skin cleanser, pat dry. Apply Critic-aid clear ointment twice a day and PRN.    B/L heels: Apply Liquid barrier film. Daily.    Intertriginous folds: Place Interdry textile sheeting, remove to wash & dry affected area, then replace. Individual sheeting may be used for up to 5 days unless soiled.     If/when prognosis or status changes consider Plastics evaluation for chin wound.    Continue low air loss bed therapy, continue heel elevation, continue to turn & reposition q2h, continue moisture management with barrier creams & single breathable pad, continue measures to decrease friction/shear/pressure.    Please call wound care service line is further assistance is needed ([phone]).

## 2020-12-11 NOTE — PROGRESS NOTE ADULT - SUBJECTIVE AND OBJECTIVE BOX
Significant recent/past 24 hr events:    Subjective:    Review of Systems         Unable to obtain due to: intubated & sedated altered mental status _______________    Constitutional: denies fever, chills, general malaise, fatigue, weight loss, weight gain, diaphoresis   HEENT: denies dry mouth, sore throat, runny nose, photophobia, blurry vision, double vision, discharge, eye pain, difficulty hearing, vertigo, dysphagia, epistaxis, recent dental work    Neck: denies pain or stiffness  Respiratory: denies SOB, MCINTOSH, cough, phlegm, wheezing, hemoptysis  Cardiovascular: denies CP, palpitations, edema, diaphoresis   Gastrointestinal: denies nausea, vomiting or hematemesis, diarrhea, constipation, abdominal or epigastric pain, melena or hematochezia   Genitourinary: denies dysuria, frequency, urgency, incontinence, hematuria   Skin: denies rash, hives, itching, burning, masses  Musculoskeletal: denies myalgias, arthritis, joint swelling, muscle weakness  Neurologic: denies syncope, LOC, headache, weakness, dizziness, parasthesias, numbness, seizures, confusion, dementia   Psychiatric: denies feeling anxious, depressed, suicidal, homicidal  Endocrine: denies increased fingerstick glucoses, cold or heat intolerance, polydipsia, polyuria, polyphagia, hair loss  Hematology/Oncology: denies bruising, tender or enlarged lymph nodes   Allergy/immunologic: denies hives or eczema  ROS negative x 10 systems except as noted above      Patient is a 60y old  Female who presents with a chief complaint of Transfer from [facility] (10 Dec 2020 14:13)    HPI:  Patient is a 61yo F w/ AARON, peritonitis s/p Oral's procedure and ileostomy (reversed ), HTN, prior DVT/PE, ?Asthma admitted on 2020 to [facility] after presenting for  productive cough  w/ SOB x9 days and diarrhea x7 days and fever x1 day. Patient was found to be COVID + on 2020 and completed remdesivir -. Patient was intubated for airway protection on 2020. Patient has required proning (last 2020). Hospital course c/b by ATN, requiring CVVHD now on intermittent HD, unresponsive to bumex challenge last HD . Course also c/b Stenotrophomonas (, completed Levaquin x7 days), MSSA/P. mirablis bacteremia (on Cefazolin, potentially due to urine vs line-associated and MSSA in sputum due to PNA), also with lung abscesses on CT chest on .  Patient currently on Volume AC 30/350/8/35% on Precedex. Patient intermittently opens eyes but is not yet responsive. Transferred over to [facility] on 12/10 to offload [facility] COVID ICU    (10 Dec 2020 14:13)    PAST MEDICAL & SURGICAL HISTORY:  Pneumomediastinum    Umbilical hernia  Reduciable    Osteoarthritis of both knees, unspecified osteoarthritis type    AARON (Obstructive Sleep Apnea)  category II pt uses c/pap pt to bering to hospital/  OR booking notified    Pneumonia      GERD (Gastroesophageal Reflux Disease)    Asthma  diagnosed   on adbvair denies attacks    DVT (Deep Venous Thrombosis)  left leg 6 m s/p knee replacement in     HTN (Hypertension)    H/O ileostomy  Ileostomy Revesal     S/P LEEP      S/P Exploratory Laparotomy    peritonitis  s/p right knee replacement/ colon resection and ileostomy    S/P Colonoscopy      S/P Endoscopy   gerd    S/P  Section      History of Tubal Ligation  s/p c/section 2000    S/P Knee Surgery  2010      FAMILY HISTORY:  Family history of gastric cancer    Family history of breast cancer (Grandparent)        Vitals   ICU Vital Signs Last 24 Hrs  T(C): 38.4 (11 Dec 2020 06:00), Max: 38.8 (11 Dec 2020 04:00)  T(F): 101.2 (11 Dec 2020 06:00), Max: 101.8 (11 Dec 2020 04:00)  HR: 112 (11 Dec 2020 07:20) (61 - 121)  BP: 104/41 (11 Dec 2020 07:00) (82/47 - 185/48)  BP(mean): 55 (11 Dec 2020 07:00) (43 - 96)  ABP: --  ABP(mean): --  RR: 42 (11 Dec 2020 07:00) (25 - 42)  SpO2: 94% (11 Dec 2020 07:20) (76% - 100%)      Physical Exam:   Constitutional: NAD, well-groomed, well-developed  HEENT: PERRLA, EOMI, no drainage or redness  Neck: supple,  No JVD, Trachea midline  Back: Normal spine flexure, No CVA tenderness, No deformity or limitation of movement  Respiratory: Breath Sounds equal & clear bilaterally to auscultation, no accessory muscle use noted  Cardiovascular: Regular rate, regular rhythm, normal S1, S2; no murmurs or rub  Gastrointestinal: Soft, non-tender, non distended, no hepatosplenomegaly, normal bowel sounds  Extremities: HUANG x 4, no peripheral edema, no cyanosis, no clubbing   Vascular: Equal and normal pulses: 2+ peripheral pulses throughout  Neurological: A+O x 3; speech clear and intact; no sensory, motor  deficits, normal reflexes  Psychiatric: calm, normal mood, normal affect  Musculoskeletal: No joint swelling or deformity; no limitation of movement  Skin: warm, dry, well perfused, no rashes    VENT SETTINGS   Mode: AC/ CMV (Assist Control/ Continuous Mandatory Ventilation)  RR (machine): 30  TV (machine): 350  FiO2: 30  PEEP: 8  ITime: 0.92  MAP: 15  PIP: 35    ABG - ( 11 Dec 2020 04:46 )  pH, Arterial: 7.38  pH, Blood: x     /  pCO2: 31    /  pO2: 74    / HCO3: 20    / Base Excess: -5.8  /  SaO2: 93.9                I&O's Detail    10 Dec 2020 07:01  -  11 Dec 2020 07:00  --------------------------------------------------------  IN:    Dexmedetomidine: 203.9 mL    Enteral Tube Flush: 60 mL    Heparin Infusion: 147 mL    IV PiggyBack: 100 mL    Nepro: 130 mL  Total IN: 640.9 mL    OUT:    Indwelling Catheter - Urethral (mL): 395 mL    Voided (mL): 500 mL  Total OUT: 895 mL    Total NET: -254.1 mL          LABS                        8.0    7.55  )-----------( 167      ( 11 Dec 2020 04:46 )             24.9     12-11    129<L>  |  93<L>  |  78<H>  ----------------------------<  109<H>  4.9   |  18<L>  |  4.30<H>    Ca    9.8      11 Dec 2020 04:46  Phos  5.4       Mg     2.1         TPro  6.5  /  Alb  2.5<L>  /  TBili  0.8  /  DBili  x   /  AST  28  /  ALT  <5<L>  /  AlkPhos  91      LIVER FUNCTIONS - ( 11 Dec 2020 04:46 )  Alb: 2.5 g/dL / Pro: 6.5 g/dL / ALK PHOS: 91 U/L / ALT: <5 U/L / AST: 28 U/L / GGT: x           PT/INR - ( 11 Dec 2020 04:46 )   PT: 16.7 sec;   INR: 1.49 ratio         PTT - ( 11 Dec 2020 04:46 )  PTT:37.2 sec          POCT Blood Glucose.: 118 mg/dL <H> (20 @ 05:09)  POCT Blood Glucose.: 113 mg/dL <H> (12-10-20 @ 23:20)  POCT Blood Glucose.: 161 mg/dL <H> (12-10-20 @ 17:49)  POCT Blood Glucose.: 156 mg/dL <H> (12-10-20 @ 11:34)        MEDICATIONS  (STANDING):  ceFAZolin   IVPB 1000 milliGRAM(s) IV Intermittent every 24 hours  chlorhexidine 0.12% Liquid 15 milliLiter(s) Oral Mucosa every 12 hours  chlorhexidine 4% Liquid 1 Application(s) Topical <User Schedule>  dexMEDEtomidine Infusion 0.5 MICROgram(s)/kG/Hr (14.4 mL/Hr) IV Continuous <Continuous>  heparin  Infusion. 1200 Unit(s)/Hr (12 mL/Hr) IV Continuous <Continuous>  insulin lispro (ADMELOG) corrective regimen sliding scale   SubCutaneous every 6 hours  insulin NPH human recombinant 10 Unit(s) SubCutaneous every 6 hours  midodrine 30 milliGRAM(s) Oral every 8 hours  norepinephrine Infusion 0.05 MICROgram(s)/kG/Min (10.8 mL/Hr) IV Continuous <Continuous>  polyethylene glycol 3350 17 Gram(s) Oral daily  senna Syrup 15 milliLiter(s) Oral two times a day    MEDICATIONS  (PRN):      Allergies:  Kiwi (Unknown)  latex (Anaphylaxis)  latex (Unknown)  penicillin (Other)  penicillin (Unknown)  perfume  hives (Other)  potassium acetate (Other)  soap additives hives (Other)   Significant recent/past 24 hr events: Transferred from [facility]. Pt febrile to 101.8F overnight. Pt was recultured and started on broad spectrum ABX.    Subjective:    Review of Systems       Unable to obtain due to: intubated & sedated         HPI:  Patient is a 60 year old female with a PMH of AARON, peritonitis s/p Oral's procedure and ileostomy (reversed ), HTN, prior DVT/PE, ?Asthma admitted on 2020 to [facility] after presenting for  productive cough  w/ SOB x9 days and diarrhea x7 days and fever x1 day. Patient was found to be COVID + on 2020 and completed remdesivir -. Patient was intubated for airway protection on 2020. Patient has required proning (last 2020). Hospital course c/b by ATN, requiring CVVHD now on intermittent HD, unresponsive to Bumex challenge last HD . Course also c/b Stenotrophomonas (, completed Levaquin x7 days), MSSA/P. mirablis bacteremia (on Cefazolin, potentially due to urine vs line-associated and MSSA in sputum due to PNA), also with lung abscesses on CT chest on .  Patient currently on Volume AC 30/350/8/35% on Precedex. Patient intermittently opens eyes but is not yet responsive. Transferred over to [facility] on 12/10 to offload [facility] COVID ICU        PAST MEDICAL & SURGICAL HISTORY:  Pneumomediastinum    Umbilical hernia  Reduciable    Osteoarthritis of both knees, unspecified osteoarthritis type    AARON (Obstructive Sleep Apnea)  category II pt uses c/pap pt to bering to hospital/  OR booking notified    Pneumonia      GERD (Gastroesophageal Reflux Disease)    Asthma  diagnosed   on adbvair denies attacks    DVT (Deep Venous Thrombosis)  left leg 6 m s/p knee replacement in     HTN (Hypertension)    H/O ileostomy  Ileostomy Revesal     S/P LEEP      S/P Exploratory Laparotomy    peritonitis  s/p right knee replacement/ colon resection and ileostomy    S/P Colonoscopy      S/P Endoscopy   gerd    S/P  Section  2010    History of Tubal Ligation  s/p c/section     S/P Knee Surgery  2010      FAMILY HISTORY:  Family history of gastric cancer    Family history of breast cancer (Grandparent)        Vitals   ICU Vital Signs Last 24 Hrs  T(C): 38.4 (11 Dec 2020 06:00), Max: 38.8 (11 Dec 2020 04:00)  T(F): 101.2 (11 Dec 2020 06:00), Max: 101.8 (11 Dec 2020 04:00)  HR: 112 (11 Dec 2020 07:20) (61 - 121)  BP: 104/41 (11 Dec 2020 07:00) (82/47 - 185/48)  BP(mean): 55 (11 Dec 2020 07:00) (43 - 96)  RR: 42 (11 Dec 2020 07:00) (25 - 42)  SpO2: 94% (11 Dec 2020 07:20) (76% - 100%)      Physical Exam:   Respiratory: Orally intubated with mechanical Breath Sounds equal bilaterally to auscultation  Cardiovascular: Regular rate, regular rhythm, normal S1, S2; no murmurs or rub  Gastrointestinal: Soft, non-tender, non distended, no hepatosplenomegaly, normal bowel sounds  Extremities: +2 peripheral edema, no cyanosis, no clubbing   Vascular: Equal and normal pulses: 2+ peripheral pulses throughout  Neurological: intubated and sedated  Skin: warm, dry, well perfused, no rashes    VENT SETTINGS   Mode: AC/ CMV (Assist Control/ Continuous Mandatory Ventilation)  RR (machine): 30  TV (machine): 350  FiO2: 30  PEEP: 8  ITime: 0.92  MAP: 15  PIP: 35    ABG - ( 11 Dec 2020 04:46 )  pH, Arterial: 7.38  pH, Blood: x     /  pCO2: 31    /  pO2: 74    / HCO3: 20    / Base Excess: -5.8  /  SaO2: 93.9        I&O's Detail    10 Dec 2020 07:01  -  11 Dec 2020 07:00  --------------------------------------------------------  IN:    Dexmedetomidine: 203.9 mL    Enteral Tube Flush: 60 mL    Heparin Infusion: 147 mL    IV PiggyBack: 100 mL    Nepro: 130 mL  Total IN: 640.9 mL    OUT:    Indwelling Catheter - Urethral (mL): 395 mL    Voided (mL): 500 mL  Total OUT: 895 mL    Total NET: -254.1 mL          LABS                        8.0    7.55  )-----------( 167      ( 11 Dec 2020 04:46 )             24.9         129<L>  |  93<L>  |  78<H>  ----------------------------<  109<H>  4.9   |  18<L>  |  4.30<H>    Ca    9.8      11 Dec 2020 04:46  Phos  5.4       Mg     2.1         TPro  6.5  /  Alb  2.5<L>  /  TBili  0.8  /  DBili  x   /  AST  28  /  ALT  <5<L>  /  AlkPhos  91      LIVER FUNCTIONS - ( 11 Dec 2020 04:46 )  Alb: 2.5 g/dL / Pro: 6.5 g/dL / ALK PHOS: 91 U/L / ALT: <5 U/L / AST: 28 U/L / GGT: x           PT/INR - ( 11 Dec 2020 04:46 )   PT: 16.7 sec;   INR: 1.49 ratio         PTT - ( 11 Dec 2020 04:46 )  PTT:37.2 sec          POCT Blood Glucose.: 118 mg/dL <H> (20 @ 05:09)  POCT Blood Glucose.: 113 mg/dL <H> (12-10-20 @ 23:20)  POCT Blood Glucose.: 161 mg/dL <H> (12-10-20 @ 17:49)  POCT Blood Glucose.: 156 mg/dL <H> (12-10-20 @ 11:34)        MEDICATIONS  (STANDING):  ceFAZolin   IVPB 1000 milliGRAM(s) IV Intermittent every 24 hours  chlorhexidine 0.12% Liquid 15 milliLiter(s) Oral Mucosa every 12 hours  chlorhexidine 4% Liquid 1 Application(s) Topical <User Schedule>  dexMEDEtomidine Infusion 0.5 MICROgram(s)/kG/Hr (14.4 mL/Hr) IV Continuous <Continuous>  heparin  Infusion. 1200 Unit(s)/Hr (12 mL/Hr) IV Continuous <Continuous>  insulin lispro (ADMELOG) corrective regimen sliding scale   SubCutaneous every 6 hours  insulin NPH human recombinant 10 Unit(s) SubCutaneous every 6 hours  midodrine 30 milliGRAM(s) Oral every 8 hours  norepinephrine Infusion 0.05 MICROgram(s)/kG/Min (10.8 mL/Hr) IV Continuous <Continuous>  polyethylene glycol 3350 17 Gram(s) Oral daily  senna Syrup 15 milliLiter(s) Oral two times a day    MEDICATIONS  (PRN):      Allergies:  Kiwi (Unknown)  latex (Anaphylaxis)  latex (Unknown)  penicillin (Other)  penicillin (Unknown)  perfume  hives (Other)  potassium acetate (Other)  soap additives hives (Other)

## 2020-12-11 NOTE — PROGRESS NOTE ADULT - ATTENDING COMMENTS
Agree with above. Seen and examined with team on rounds. Critically ill requiring frequent bedside visits. Intubated with severe COVID PNA. Supportive care, close follow up of labs. Weaning trials as tolerated.

## 2020-12-11 NOTE — PROGRESS NOTE ADULT - PROBLEM SELECTOR PLAN 1
Pt with ARTEMIO in the setting of COVID-19. Pt. likely has ATN. Scr was WNL at 0.53 on 11/23/20 and progressively worsened to 3.0 on 11/27. Patient was initiated on CRRT 11/27 for oliguric ARTEMIO. CRRT was discontinued on 12/6/20 and transitioned to iHD on 12/8 with 1.3L removed. Pt. subsequently started on diuretics and remained non-oliguric 895cc in last 24hrs. Labs reviewed. Pt. still with evidence of volume overload on exam. Will plan for HD today with UF as tolerated. Pt. might need vasopressor support during HD. Monitor labs and urine output. Avoid NSAIDs, ACEI/ARBS and nephrotoxins. Dose medications as per eGFR<10    If you have any questions, please feel free to contact me  Carlos Jones  Nephrology Fellow  [phone]  (After 5pm or on weekends please page the on-call fellow) Pt with ARTEMIO in the setting of COVID-19. Pt. likely has ATN. Scr was WNL at 0.53 on 11/23/20 and progressively worsened to 3.0 on 11/27. Patient was initiated on CRRT at [facility] on 11/27/20 for oliguric ARTEMIO. CRRT was discontinued on 12/6/20 and transitioned to intermittent HD on 12/8/20. Pt. subsequently started on diuretics and is currently non-oliguric (895cc in last 24hrs). Pt. however clinically volume overloaded. Plan for HD/UF treatment today as per discussion with ICU team. Pt. might need increased vasopressor support during HD. Monitor labs and urine output. Avoid any potential nephrotoxins. Dose medications as per eGFR.     If you have any questions, please feel free to contact me  Carlos Jones  Nephrology Fellow  [phone]  (After 5pm or on weekends please page the on-call fellow)

## 2020-12-12 LAB
ALBUMIN SERPL ELPH-MCNC: 2.3 G/DL — LOW (ref 3.3–5)
ALBUMIN SERPL ELPH-MCNC: 2.4 G/DL — LOW (ref 3.3–5)
ALP SERPL-CCNC: 85 U/L — SIGNIFICANT CHANGE UP (ref 40–120)
ALP SERPL-CCNC: 90 U/L — SIGNIFICANT CHANGE UP (ref 40–120)
ALT FLD-CCNC: <5 U/L — LOW (ref 4–33)
ALT FLD-CCNC: <5 U/L — LOW (ref 4–33)
ANION GAP SERPL CALC-SCNC: 15 MMOL/L — HIGH (ref 7–14)
ANION GAP SERPL CALC-SCNC: 16 MMOL/L — HIGH (ref 7–14)
APTT BLD: 107.8 SEC — HIGH (ref 27–36.3)
APTT BLD: 69.3 SEC — HIGH (ref 27–36.3)
AST SERPL-CCNC: 28 U/L — SIGNIFICANT CHANGE UP (ref 4–32)
AST SERPL-CCNC: 29 U/L — SIGNIFICANT CHANGE UP (ref 4–32)
BASOPHILS # BLD AUTO: 0.05 K/UL — SIGNIFICANT CHANGE UP (ref 0–0.2)
BASOPHILS NFR BLD AUTO: 0.7 % — SIGNIFICANT CHANGE UP (ref 0–2)
BILIRUB SERPL-MCNC: 0.9 MG/DL — SIGNIFICANT CHANGE UP (ref 0.2–1.2)
BILIRUB SERPL-MCNC: 0.9 MG/DL — SIGNIFICANT CHANGE UP (ref 0.2–1.2)
BLOOD GAS ARTERIAL COMPREHENSIVE RESULT: SIGNIFICANT CHANGE UP
BLOOD GAS ARTERIAL COMPREHENSIVE RESULT: SIGNIFICANT CHANGE UP
BUN SERPL-MCNC: 42 MG/DL — HIGH (ref 7–23)
BUN SERPL-MCNC: 54 MG/DL — HIGH (ref 7–23)
CALCIUM SERPL-MCNC: 8.4 MG/DL — SIGNIFICANT CHANGE UP (ref 8.4–10.5)
CALCIUM SERPL-MCNC: 8.5 MG/DL — SIGNIFICANT CHANGE UP (ref 8.4–10.5)
CHLORIDE SERPL-SCNC: 95 MMOL/L — LOW (ref 98–107)
CHLORIDE SERPL-SCNC: 96 MMOL/L — LOW (ref 98–107)
CO2 SERPL-SCNC: 21 MMOL/L — LOW (ref 22–31)
CO2 SERPL-SCNC: 22 MMOL/L — SIGNIFICANT CHANGE UP (ref 22–31)
CREAT SERPL-MCNC: 2.88 MG/DL — HIGH (ref 0.5–1.3)
CREAT SERPL-MCNC: 3.46 MG/DL — HIGH (ref 0.5–1.3)
CULTURE RESULTS: NO GROWTH — SIGNIFICANT CHANGE UP
CULTURE RESULTS: SIGNIFICANT CHANGE UP
CULTURE RESULTS: SIGNIFICANT CHANGE UP
EOSINOPHIL # BLD AUTO: 0.09 K/UL — SIGNIFICANT CHANGE UP (ref 0–0.5)
EOSINOPHIL NFR BLD AUTO: 1.3 % — SIGNIFICANT CHANGE UP (ref 0–6)
GLUCOSE BLDC GLUCOMTR-MCNC: 152 MG/DL — HIGH (ref 70–99)
GLUCOSE BLDC GLUCOMTR-MCNC: 207 MG/DL — HIGH (ref 70–99)
GLUCOSE BLDC GLUCOMTR-MCNC: 207 MG/DL — HIGH (ref 70–99)
GLUCOSE BLDC GLUCOMTR-MCNC: 232 MG/DL — HIGH (ref 70–99)
GLUCOSE SERPL-MCNC: 190 MG/DL — HIGH (ref 70–99)
GLUCOSE SERPL-MCNC: 275 MG/DL — HIGH (ref 70–99)
GRAM STN FLD: SIGNIFICANT CHANGE UP
HCT VFR BLD CALC: 23.9 % — LOW (ref 34.5–45)
HCT VFR BLD CALC: 24.3 % — LOW (ref 34.5–45)
HGB BLD-MCNC: 7.3 G/DL — LOW (ref 11.5–15.5)
HGB BLD-MCNC: 7.4 G/DL — LOW (ref 11.5–15.5)
IANC: 3.82 K/UL — SIGNIFICANT CHANGE UP (ref 1.5–8.5)
IMM GRANULOCYTES NFR BLD AUTO: 5.2 % — HIGH (ref 0–1.5)
INR BLD: 1.81 RATIO — HIGH (ref 0.88–1.17)
LYMPHOCYTES # BLD AUTO: 1.67 K/UL — SIGNIFICANT CHANGE UP (ref 1–3.3)
LYMPHOCYTES # BLD AUTO: 23.3 % — SIGNIFICANT CHANGE UP (ref 13–44)
MAGNESIUM SERPL-MCNC: 1.9 MG/DL — SIGNIFICANT CHANGE UP (ref 1.6–2.6)
MCHC RBC-ENTMCNC: 28.6 PG — SIGNIFICANT CHANGE UP (ref 27–34)
MCHC RBC-ENTMCNC: 28.9 PG — SIGNIFICANT CHANGE UP (ref 27–34)
MCHC RBC-ENTMCNC: 30 GM/DL — LOW (ref 32–36)
MCHC RBC-ENTMCNC: 31 GM/DL — LOW (ref 32–36)
MCV RBC AUTO: 92.3 FL — SIGNIFICANT CHANGE UP (ref 80–100)
MCV RBC AUTO: 96 FL — SIGNIFICANT CHANGE UP (ref 80–100)
MONOCYTES # BLD AUTO: 1.16 K/UL — HIGH (ref 0–0.9)
MONOCYTES NFR BLD AUTO: 16.2 % — HIGH (ref 2–14)
MRSA PCR RESULT.: SIGNIFICANT CHANGE UP
NEUTROPHILS # BLD AUTO: 3.82 K/UL — SIGNIFICANT CHANGE UP (ref 1.8–7.4)
NEUTROPHILS NFR BLD AUTO: 53.3 % — SIGNIFICANT CHANGE UP (ref 43–77)
NRBC # BLD: 0 /100 WBCS — SIGNIFICANT CHANGE UP
NRBC # BLD: 2 /100 WBCS — SIGNIFICANT CHANGE UP
NRBC # FLD: 0.03 K/UL — HIGH
NRBC # FLD: 0.12 K/UL — HIGH
PHOSPHATE SERPL-MCNC: 6 MG/DL — HIGH (ref 2.5–4.5)
PLATELET # BLD AUTO: 141 K/UL — LOW (ref 150–400)
PLATELET # BLD AUTO: 154 K/UL — SIGNIFICANT CHANGE UP (ref 150–400)
POTASSIUM SERPL-MCNC: 4 MMOL/L — SIGNIFICANT CHANGE UP (ref 3.5–5.3)
POTASSIUM SERPL-MCNC: 4.7 MMOL/L — SIGNIFICANT CHANGE UP (ref 3.5–5.3)
POTASSIUM SERPL-SCNC: 4 MMOL/L — SIGNIFICANT CHANGE UP (ref 3.5–5.3)
POTASSIUM SERPL-SCNC: 4.7 MMOL/L — SIGNIFICANT CHANGE UP (ref 3.5–5.3)
PROT SERPL-MCNC: 6.2 G/DL — SIGNIFICANT CHANGE UP (ref 6–8.3)
PROT SERPL-MCNC: 6.2 G/DL — SIGNIFICANT CHANGE UP (ref 6–8.3)
PROTHROM AB SERPL-ACNC: 20.2 SEC — HIGH (ref 9.8–13.1)
RBC # BLD: 2.53 M/UL — LOW (ref 3.8–5.2)
RBC # BLD: 2.59 M/UL — LOW (ref 3.8–5.2)
RBC # FLD: 21.8 % — HIGH (ref 10.3–14.5)
RBC # FLD: 21.9 % — HIGH (ref 10.3–14.5)
S AUREUS DNA NOSE QL NAA+PROBE: DETECTED
SODIUM SERPL-SCNC: 132 MMOL/L — LOW (ref 135–145)
SODIUM SERPL-SCNC: 133 MMOL/L — LOW (ref 135–145)
SPECIMEN SOURCE: SIGNIFICANT CHANGE UP
WBC # BLD: 7.16 K/UL — SIGNIFICANT CHANGE UP (ref 3.8–10.5)
WBC # BLD: 7.53 K/UL — SIGNIFICANT CHANGE UP (ref 3.8–10.5)
WBC # FLD AUTO: 7.16 K/UL — SIGNIFICANT CHANGE UP (ref 3.8–10.5)
WBC # FLD AUTO: 7.53 K/UL — SIGNIFICANT CHANGE UP (ref 3.8–10.5)

## 2020-12-12 PROCEDURE — 99291 CRITICAL CARE FIRST HOUR: CPT | Mod: 25

## 2020-12-12 PROCEDURE — 70450 CT HEAD/BRAIN W/O DYE: CPT | Mod: 26

## 2020-12-12 PROCEDURE — 99233 SBSQ HOSP IP/OBS HIGH 50: CPT | Mod: GC

## 2020-12-12 RX ORDER — ACETAMINOPHEN 500 MG
1000 TABLET ORAL ONCE
Refills: 0 | Status: COMPLETED | OUTPATIENT
Start: 2020-12-12 | End: 2020-12-12

## 2020-12-12 RX ORDER — VANCOMYCIN HCL 1 G
1250 VIAL (EA) INTRAVENOUS ONCE
Refills: 0 | Status: COMPLETED | OUTPATIENT
Start: 2020-12-12 | End: 2020-12-12

## 2020-12-12 RX ORDER — HEPARIN SODIUM 5000 [USP'U]/ML
1100 INJECTION INTRAVENOUS; SUBCUTANEOUS
Qty: 25000 | Refills: 0 | Status: DISCONTINUED | OUTPATIENT
Start: 2020-12-12 | End: 2020-12-17

## 2020-12-12 RX ORDER — NOREPINEPHRINE BITARTRATE/D5W 8 MG/250ML
0.1 PLASTIC BAG, INJECTION (ML) INTRAVENOUS
Qty: 8 | Refills: 0 | Status: DISCONTINUED | OUTPATIENT
Start: 2020-12-12 | End: 2020-12-19

## 2020-12-12 RX ADMIN — HEPARIN SODIUM 1100 UNIT(S)/HR: 5000 INJECTION INTRAVENOUS; SUBCUTANEOUS at 20:29

## 2020-12-12 RX ADMIN — Medication 166.67 MILLIGRAM(S): at 02:30

## 2020-12-12 RX ADMIN — HUMAN INSULIN 10 UNIT(S): 100 INJECTION, SUSPENSION SUBCUTANEOUS at 23:49

## 2020-12-12 RX ADMIN — MIDODRINE HYDROCHLORIDE 30 MILLIGRAM(S): 2.5 TABLET ORAL at 05:20

## 2020-12-12 RX ADMIN — DEXMEDETOMIDINE HYDROCHLORIDE IN 0.9% SODIUM CHLORIDE 14.4 MICROGRAM(S)/KG/HR: 4 INJECTION INTRAVENOUS at 13:28

## 2020-12-12 RX ADMIN — HUMAN INSULIN 10 UNIT(S): 100 INJECTION, SUSPENSION SUBCUTANEOUS at 05:20

## 2020-12-12 RX ADMIN — MIDODRINE HYDROCHLORIDE 30 MILLIGRAM(S): 2.5 TABLET ORAL at 22:19

## 2020-12-12 RX ADMIN — MIDODRINE HYDROCHLORIDE 30 MILLIGRAM(S): 2.5 TABLET ORAL at 13:29

## 2020-12-12 RX ADMIN — Medication 4: at 23:48

## 2020-12-12 RX ADMIN — HUMAN INSULIN 10 UNIT(S): 100 INJECTION, SUSPENSION SUBCUTANEOUS at 18:30

## 2020-12-12 RX ADMIN — FENTANYL CITRATE 1.15 MICROGRAM(S)/KG/HR: 50 INJECTION INTRAVENOUS at 07:56

## 2020-12-12 RX ADMIN — CHLORHEXIDINE GLUCONATE 15 MILLILITER(S): 213 SOLUTION TOPICAL at 05:20

## 2020-12-12 RX ADMIN — HUMAN INSULIN 10 UNIT(S): 100 INJECTION, SUSPENSION SUBCUTANEOUS at 12:49

## 2020-12-12 RX ADMIN — Medication 21.6 MICROGRAM(S)/KG/MIN: at 20:30

## 2020-12-12 RX ADMIN — CHLORHEXIDINE GLUCONATE 15 MILLILITER(S): 213 SOLUTION TOPICAL at 18:27

## 2020-12-12 RX ADMIN — FENTANYL CITRATE 1.15 MICROGRAM(S)/KG/HR: 50 INJECTION INTRAVENOUS at 23:51

## 2020-12-12 RX ADMIN — Medication 4: at 12:49

## 2020-12-12 RX ADMIN — CHLORHEXIDINE GLUCONATE 1 APPLICATION(S): 213 SOLUTION TOPICAL at 07:59

## 2020-12-12 RX ADMIN — POLYETHYLENE GLYCOL 3350 17 GRAM(S): 17 POWDER, FOR SOLUTION ORAL at 12:48

## 2020-12-12 RX ADMIN — MEROPENEM 100 MILLIGRAM(S): 1 INJECTION INTRAVENOUS at 15:20

## 2020-12-12 RX ADMIN — DEXMEDETOMIDINE HYDROCHLORIDE IN 0.9% SODIUM CHLORIDE 14.4 MICROGRAM(S)/KG/HR: 4 INJECTION INTRAVENOUS at 20:28

## 2020-12-12 RX ADMIN — Medication 2: at 18:30

## 2020-12-12 RX ADMIN — DEXMEDETOMIDINE HYDROCHLORIDE IN 0.9% SODIUM CHLORIDE 14.4 MICROGRAM(S)/KG/HR: 4 INJECTION INTRAVENOUS at 07:57

## 2020-12-12 RX ADMIN — DEXMEDETOMIDINE HYDROCHLORIDE IN 0.9% SODIUM CHLORIDE 14.4 MICROGRAM(S)/KG/HR: 4 INJECTION INTRAVENOUS at 22:19

## 2020-12-12 RX ADMIN — Medication 4: at 05:20

## 2020-12-12 NOTE — PROGRESS NOTE ADULT - PROBLEM SELECTOR PLAN 1
Pt with ARTEMIO in the setting of COVID-19. Pt. likely has ATN. Scr was WNL at 0.53 on 11/23/20 and progressively worsened to 3.0 on 11/27. Patient was initiated on CRRT at [facility] on 11/27/20 for oliguric ARTEMIO. CRRT was discontinued on 12/6/20 and transitioned to intermittent HD on 12/8/20. Pt. subsequently started on diuretics and remained non-oliguric until 12/11. Pt. however clinically volume overloaded on exam yesterday and underwent HD with 2L UF. Will discuss with MICU team if volume status needs optimization with further HD/UF today. Monitor labs and urine output. Avoid any potential nephrotoxins. Dose medications as per eGFR.     If you have any questions, please feel free to contact me  Carlos Jones  Nephrology Fellow  [phone]  (After 5pm or on weekends please page the on-call fellow) Pt with ARTEMIO in the setting of COVID-19. Pt. likely has ATN. Scr was WNL at 0.53 on 11/23/20 and progressively worsened to 3.0 on 11/27. Patient was initiated on CRRT at [facility] on 11/27/20 for oliguric ARTEMIO. CRRT was discontinued on 12/6/20 and transitioned to intermittent HD on 12/8/20. Pt. subsequently started on diuretics and remained non-oliguric until 12/11. Pt. however clinically volume overloaded on exam yesterday and underwent HD with 2L UF. No plan for HD today. Will assess need for HD daily. Monitor labs and urine output. Avoid any potential nephrotoxins. Dose medications as per eGFR.     If you have any questions, please feel free to contact me  Carlos Jones  Nephrology Fellow  [phone]  (After 5pm or on weekends please page the on-call fellow)

## 2020-12-12 NOTE — PROGRESS NOTE ADULT - SUBJECTIVE AND OBJECTIVE BOX
CHIEF COMPLAINT: Patient is a 60y old  Female who presents with a chief complaint of Transfer from [facility] (11 Dec 2020 08:52)    Interval Events:    REVIEW OF SYSTEMS:  Constitutional:   Eyes:  ENT:  CV:  Resp:  GI:  :  MSK:  Integumentary:  Neurological:  Psychiatric:  Endocrine:  Hematologic/Lymphatic:  Allergic/Immunologic:  [ ] All other systems negative  [ ] Unable to assess ROS because ________    OBJECTIVE:  ICU Vital Signs Last 24 Hrs  T(C): 38.7 (12 Dec 2020 04:00), Max: 38.9 (11 Dec 2020 17:05)  T(F): 101.6 (12 Dec 2020 04:00), Max: 102 (11 Dec 2020 17:05)  HR: 74 (12 Dec 2020 08:00) (74 - 122)  BP: 156/34 (11 Dec 2020 16:00) (71/24 - 194/69)  BP(mean): 66 (11 Dec 2020 16:00) (34 - 98)  ABP: 115/48 (12 Dec 2020 08:00) (114/45 - 174/59)  ABP(mean): 70 (12 Dec 2020 08:00) (66 - 92)  RR: 30 (12 Dec 2020 08:00) (29 - 38)  SpO2: 99% (12 Dec 2020 08:00) (86% - 100%)    Mode: AC/ CMV (Assist Control/ Continuous Mandatory Ventilation), RR (machine): 30, TV (machine): 370, FiO2: 40, PEEP: 8, ITime: 0.49, MAP: 20, PIP: 45     @ 07:  -   @ 07:00  --------------------------------------------------------  IN: 3448.1 mL / OUT: 2740 mL / NET: 708.1 mL     @ 07:01  -   @ 08:09  --------------------------------------------------------  IN: 135.6 mL / OUT: 0 mL / NET: 135.6 mL      CAPILLARY BLOOD GLUCOSE      POCT Blood Glucose.: 232 mg/dL (12 Dec 2020 05:12)      PHYSICAL EXAM:  General: NAD, doing well.  HEENT: Intubated. OG in place. Brainstem reflexes in place  Lymph Nodes: No BELGICA palpated  Neck: trachea midline. no trach.   Respiratory: CTA b/l. No rales, rhonchi, wheezing appreciated.  Cardiovascular: RRR. +s1/s2, -s3/s4. No murmur, rubs, gallops. No edema  Abdomen: NT/ND. +BS, No HSM.   Extremities: 2+ pulses  Skin: edematous. No rashes, ecchymoses, or petechiae noted  Neurological: AAOx3. DTR 2+. strength 5/5 UE/LE bilaterally.   Psychiatry: Appropriate mood and affect.    HOSPITAL MEDICATIONS:  MEDICATIONS  (STANDING):  chlorhexidine 0.12% Liquid 15 milliLiter(s) Oral Mucosa every 12 hours  chlorhexidine 4% Liquid 1 Application(s) Topical <User Schedule>  CRRT Treatment    <Continuous>  dexMEDEtomidine Infusion 0.5 MICROgram(s)/kG/Hr (14.4 mL/Hr) IV Continuous <Continuous>  fentaNYL   Infusion..... 0.5 MICROgram(s)/kG/Hr (1.15 mL/Hr) IV Continuous <Continuous>  heparin  Infusion. 1100 Unit(s)/Hr (11 mL/Hr) IV Continuous <Continuous>  insulin lispro (ADMELOG) corrective regimen sliding scale   SubCutaneous every 6 hours  insulin NPH human recombinant 10 Unit(s) SubCutaneous every 6 hours  meropenem  IVPB 500 milliGRAM(s) IV Intermittent every 24 hours  midodrine 30 milliGRAM(s) Oral every 8 hours  norepinephrine Infusion 0.1 MICROgram(s)/kG/Min (21.6 mL/Hr) IV Continuous <Continuous>  polyethylene glycol 3350 17 Gram(s) Oral daily  PrismaSATE Dialysate BK 0 / 3.5 5000 milliLiter(s) (2000 mL/Hr) CRRT <Continuous>  PrismaSOL Filtration BGK 0 / 2.5 5000 milliLiter(s) (1300 mL/Hr) CRRT <Continuous>  PrismaSOL Filtration BGK 0 / 2.5 5000 milliLiter(s) (200 mL/Hr) CRRT <Continuous>  senna Syrup 15 milliLiter(s) Oral two times a day    MEDICATIONS  (PRN):      LABS:  ( @ 04:58)                        7.4  7.16 )-----------( 141                 23.9    Neutrophils = 3.82 (53.3%)  Lymphocytes = 1.67 (23.3%)  Eosinophils = 0.09 (1.3%)  Basophils = 0.05 (0.7%)  Monocytes = 1.16 (16.2%)  Bands = --%    WBC Trend: 7.16<--, 14.95<--, 7.55<--  Hb Trend: 7.4<--, 8.9<--, 8.0<--, 8.6<--, 8.6<--  Plt Trend: 141<--, 229<--, 167<--, 157<--, 129<--  12    133<L>  |  96<L>  |  42<H>  ----------------------------<  275<H>  4.0   |  22  |  2.88<H>    Ca    8.4      12 Dec 2020 04:58  Phos  3.5       Mg     1.9         TPro  6.2  /  Alb  2.4<L>  /  TBili  0.9  /  DBili  x   /  AST  28  /  ALT  <5<L>  /  AlkPhos  90      Creatinine Trend: 2.88<--, 2.46<--, 2.52<--, 4.35<--, 4.30<--, 3.49<--  PT/INR - ( 12 Dec 2020 04:58 )   PT: 20.2 sec;   INR: 1.81 ratio         PTT - ( 12 Dec 2020 04:58 )  PTT:107.8 sec  Urinalysis Basic - ( 11 Dec 2020 13:05 )    Color: Yellow / Appearance: Slightly Turbid / S.017 / pH: x  Gluc: x / Ketone: Negative  / Bili: Negative / Urobili: <2 mg/dL   Blood: x / Protein: 30 mg/dL / Nitrite: Negative   Leuk Esterase: Moderate / RBC: >50 /HPF / WBC 11-25 /HPF   Sq Epi: x / Non Sq Epi: Few / Bacteria: Few      Arterial Blood Gas:   @ 04:58  7.40/39/92/24/97.2/-0.9  ABG lactate: --  Arterial Blood Gas:   @ 19:02  7.41/39/244/24/99.9/--  ABG lactate: --  Arterial Blood Gas:   @ 04:46  7.38/31/74/20/93.9/-5.8  ABG lactate: --            MICROBIOLOGY:   Blood Cx:  Urine Cx:  Sputum Cx:  Legionella:  RVP:    RADIOLOGY:  X Ray:  CT:  MRI:  Ultrasound:  [ ] Reviewed and interpreted by me    EKG:   CHIEF COMPLAINT: Patient is a 60y old  Female who presents with a chief complaint of Transfer from [facility] (11 Dec 2020 08:52)    Interval Events: CThead unremarkable. Currently HDS on norepinephrine, transitioned over to HD s/p bicarbonate gtt.     REVIEW OF SYSTEMS:  [x] Unable to assess ROS because patient intubated and sedated    OBJECTIVE:  ICU Vital Signs Last 24 Hrs  T(C): 38.7 (12 Dec 2020 04:00), Max: 38.9 (11 Dec 2020 17:05)  T(F): 101.6 (12 Dec 2020 04:00), Max: 102 (11 Dec 2020 17:05)  HR: 74 (12 Dec 2020 08:00) (74 - 122)  BP: 156/34 (11 Dec 2020 16:00) (71/24 - 194/69)  BP(mean): 66 (11 Dec 2020 16:00) (34 - 98)  ABP: 115/48 (12 Dec 2020 08:00) (114/45 - 174/59)  ABP(mean): 70 (12 Dec 2020 08:00) (66 - 92)  RR: 30 (12 Dec 2020 08:00) (29 - 38)  SpO2: 99% (12 Dec 2020 08:00) (86% - 100%)    Mode: AC/ CMV (Assist Control/ Continuous Mandatory Ventilation), RR (machine): 30, TV (machine): 370, FiO2: 40, PEEP: 8, ITime: 0.49, MAP: 20, PIP: 45     @ 07:  -   @ 07:00  --------------------------------------------------------  IN: 3448.1 mL / OUT: 2740 mL / NET: 708.1 mL     @ 07:01  -   @ 08:09  --------------------------------------------------------  IN: 135.6 mL / OUT: 0 mL / NET: 135.6 mL      CAPILLARY BLOOD GLUCOSE      POCT Blood Glucose.: 232 mg/dL (12 Dec 2020 05:12)      PHYSICAL EXAM:  General: NAD  HEENT: Intubated. OG in place. Brainstem reflexes in place  Lymph Nodes: No BELGICA palpated  Neck: trachea midline. no trach.   Respiratory: Bilateral rales and rhonchi. No wheezing.  Cardiovascular: +tachycardia. +s1/s2, -s3/s4. No murmur, rubs, gallops. +edema  Abdomen: NT/ND. +BS, No HSM.   Extremities: 2+ pulses  Skin: edematous. No rashes, ecchymoses, or petechiae noted  Neurological: No lateralizing findings    HOSPITAL MEDICATIONS:  MEDICATIONS  (STANDING):  chlorhexidine 0.12% Liquid 15 milliLiter(s) Oral Mucosa every 12 hours  chlorhexidine 4% Liquid 1 Application(s) Topical <User Schedule>  CRRT Treatment    <Continuous>  dexMEDEtomidine Infusion 0.5 MICROgram(s)/kG/Hr (14.4 mL/Hr) IV Continuous <Continuous>  fentaNYL   Infusion..... 0.5 MICROgram(s)/kG/Hr (1.15 mL/Hr) IV Continuous <Continuous>  heparin  Infusion. 1100 Unit(s)/Hr (11 mL/Hr) IV Continuous <Continuous>  insulin lispro (ADMELOG) corrective regimen sliding scale   SubCutaneous every 6 hours  insulin NPH human recombinant 10 Unit(s) SubCutaneous every 6 hours  meropenem  IVPB 500 milliGRAM(s) IV Intermittent every 24 hours  midodrine 30 milliGRAM(s) Oral every 8 hours  norepinephrine Infusion 0.1 MICROgram(s)/kG/Min (21.6 mL/Hr) IV Continuous <Continuous>  polyethylene glycol 3350 17 Gram(s) Oral daily  PrismaSATE Dialysate BK 0 / 3.5 5000 milliLiter(s) (2000 mL/Hr) CRRT <Continuous>  PrismaSOL Filtration BGK 0 / 2.5 5000 milliLiter(s) (1300 mL/Hr) CRRT <Continuous>  PrismaSOL Filtration BGK 0 / 2.5 5000 milliLiter(s) (200 mL/Hr) CRRT <Continuous>  senna Syrup 15 milliLiter(s) Oral two times a day    MEDICATIONS  (PRN):      LABS:  ( @ 04:58)                        7.4  7.16 )-----------( 141                 23.9    Neutrophils = 3.82 (53.3%)  Lymphocytes = 1.67 (23.3%)  Eosinophils = 0.09 (1.3%)  Basophils = 0.05 (0.7%)  Monocytes = 1.16 (16.2%)  Bands = --%    WBC Trend: 7.16<--, 14.95<--, 7.55<--  Hb Trend: 7.4<--, 8.9<--, 8.0<--, 8.6<--, 8.6<--  Plt Trend: 141<--, 229<--, 167<--, 157<--, 129<--  12-12    133<L>  |  96<L>  |  42<H>  ----------------------------<  275<H>  4.0   |  22  |  2.88<H>    Ca    8.4      12 Dec 2020 04:58  Phos  3.5       Mg     1.9         TPro  6.2  /  Alb  2.4<L>  /  TBili  0.9  /  DBili  x   /  AST  28  /  ALT  <5<L>  /  AlkPhos  90      Creatinine Trend: 2.88<--, 2.46<--, 2.52<--, 4.35<--, 4.30<--, 3.49<--  PT/INR - ( 12 Dec 2020 04:58 )   PT: 20.2 sec;   INR: 1.81 ratio         PTT - ( 12 Dec 2020 04:58 )  PTT:107.8 sec  Urinalysis Basic - ( 11 Dec 2020 13:05 )    Color: Yellow / Appearance: Slightly Turbid / S.017 / pH: x  Gluc: x / Ketone: Negative  / Bili: Negative / Urobili: <2 mg/dL   Blood: x / Protein: 30 mg/dL / Nitrite: Negative   Leuk Esterase: Moderate / RBC: >50 /HPF / WBC 11-25 /HPF   Sq Epi: x / Non Sq Epi: Few / Bacteria: Few      Arterial Blood Gas:   @ 04:58  7.40/39/92/24/97.2/-0.9  ABG lactate: --  Arterial Blood Gas:   @ 19:02  7.41/39/244/24/99.9/--  ABG lactate: --  Arterial Blood Gas:   @ 04:46  7.38/31/74/20/93.9/-5.8  ABG lactate: --    CT head:   IMPRESSION:  No acute intracranial findings.

## 2020-12-12 NOTE — PROGRESS NOTE ADULT - SUBJECTIVE AND OBJECTIVE BOX
[facility] Division of Kidney Diseases & Hypertension  FOLLOW UP NOTE  [phone]--------------------------------------------------------------------------------    Chief Complaint: ARTEMIO in the setting of COVID-19, on RRT/dialysis.    24 hour events/subjective: Pt. was seen and examined at bedside today in the CTICU. Pt. transferred from [facility] on 12/10/20. Pt. currently intubated and on low dose vasopressor to maintain MAP. Pt. currently oliguric. Pt. underwent HD yesterday with 2L UF.    PAST HISTORY  --------------------------------------------------------------------------------  No significant changes to PMH, PSH, FHx, SHx, unless otherwise noted    ALLERGIES & MEDICATIONS  --------------------------------------------------------------------------------  Allergies    Kiwi (Unknown)  latex (Anaphylaxis)  latex (Unknown)  penicillin (Other)  penicillin (Unknown)  perfume  hives (Other)  potassium acetate (Other)  soap additives hives (Other)    Intolerances    Standing Inpatient Medications  chlorhexidine 0.12% Liquid 15 milliLiter(s) Oral Mucosa every 12 hours  chlorhexidine 4% Liquid 1 Application(s) Topical <User Schedule>  dexMEDEtomidine Infusion 0.5 MICROgram(s)/kG/Hr IV Continuous <Continuous>  fentaNYL   Infusion..... 0.5 MICROgram(s)/kG/Hr IV Continuous <Continuous>  heparin  Infusion. 1100 Unit(s)/Hr IV Continuous <Continuous>  insulin lispro (ADMELOG) corrective regimen sliding scale   SubCutaneous every 6 hours  insulin NPH human recombinant 10 Unit(s) SubCutaneous every 6 hours  meropenem  IVPB 500 milliGRAM(s) IV Intermittent every 24 hours  midodrine 30 milliGRAM(s) Oral every 8 hours  norepinephrine Infusion 0.1 MICROgram(s)/kG/Min IV Continuous <Continuous>  polyethylene glycol 3350 17 Gram(s) Oral daily  senna Syrup 15 milliLiter(s) Oral two times a day    REVIEW OF SYSTEMS  --------------------------------------------------------------------------------  Unable to obtain due to medical condition    VITALS/PHYSICAL EXAM  --------------------------------------------------------------------------------  T(C): 37.9 (12-12-20 @ 08:00), Max: 38.9 (12-11-20 @ 17:05)  HR: 74 (12-12-20 @ 08:00) (74 - 122)  BP: 156/34 (12-11-20 @ 16:00) (71/24 - 194/69)  RR: 30 (12-12-20 @ 08:00) (29 - 35)  SpO2: 99% (12-12-20 @ 08:00) (95% - 100%)  Wt(kg): --  Height (cm): 160 (12-10-20 @ 14:29)  Weight (kg): 115 (12-10-20 @ 14:29)  BMI (kg/m2): 44.9 (12-10-20 @ 14:29)  BSA (m2): 2.14 (12-10-20 @ 14:29)    12-11-20 @ 07:01  -  12-12-20 @ 07:00  --------------------------------------------------------  IN: 3448.1 mL / OUT: 2740 mL / NET: 708.1 mL    12-12-20 @ 07:01  -  12-12-20 @ 10:11  --------------------------------------------------------  IN: 135.6 mL / OUT: 0 mL / NET: 135.6 mL    Physical Exam:              Gen: Intubated  	HEENT: ETT +  	Pulm: Fair air entry B/L  	CV: S1S2+  	Abd: Soft, +BS   	Ext: No LE edema B/L  	Neuro: sedated   	Skin: Warm and dry  	Vascular access: Right IJ non-tunneled HD catheter site : No bleeding seen    LABS/STUDIES  --------------------------------------------------------------------------------              7.4    7.16  >-----------<  141      [12-12-20 @ 04:58]              23.9     133  |  96  |  42  ----------------------------<  275      [12-12-20 @ 04:58]  4.0   |  22  |  2.88        Ca     8.4     [12-12-20 @ 04:58]      Mg     1.9     [12-11-20 @ 19:02]      Phos  3.5     [12-11-20 @ 19:02]    Creatinine Trend:  SCr 2.88 [12-12 @ 04:58]  SCr 2.46 [12-11 @ 21:51]  SCr 2.52 [12-11 @ 19:02]  SCr 4.35 [12-11 @ 13:52]  SCr 4.30 [12-11 @ 04:46]    Urinalysis - [12-11-20 @ 13:05]      Color Yellow / Appearance Slightly Turbid / SG 1.017 / pH 5.5      Gluc Negative / Ketone Negative  / Bili Negative / Urobili <2 mg/dL       Blood Large / Protein 30 mg/dL / Leuk Est Moderate / Nitrite Negative      RBC >50 / WBC 11-25 / Hyaline 1+ / Gran  / Sq Epi  / Non Sq Epi Few / Bacteria Few

## 2020-12-12 NOTE — PROGRESS NOTE ADULT - ASSESSMENT
60F PMH HTN, DVT on Xarelto, peritonitis s/p Oral's procedure and ileostomy (reversed 2011), AARON who presented to [facility] on 11/18  w/ fevers found to have COVID-19, intubated 11/23, course complicated by superimposed PNA and bacteremia with Stenotrophomonas (12/11, completed Levaquin x7 days), MSSA/P. mirablis bacteremia (on Cefazolin, potentially due to urine vs line-associated and MSSA in sputum due to PNA), also with lung abscesses on CT chest on 12/5 , ARTEMIO/ATN requiring CRRT and HD. Now unable to wean from ventilator and transferred from [facility] on 12/10, currently stable.    #Neuro  Alert & oriented x3 @baseline.   - Sedated on Precedex gtt. Will wean as tolerated.  - Pt current mental status most likely encephalopathic from renal failure and uremia  - CT head demonstrated no acute intracerebral events    #Resp  Acute hypoxemic respiratory failure 2/2 covid-19 PNA. Intubated on 11/23, now unable to wean from ventilator   - c/w AC 30/370/8/30%   - Pressure support trails daily, but may need tracheostomy if unable to wean     #CV  Hx of HTN, DVT on Xarelto  - Vasopressors as needed. Wean as tolerated  - Continue Midodrine at 30 TID  - Continue heparin gtt, currently therapeutic   - CT negative for PE    #GI  - Continue TF, tolerating well  - Continue bowel regimen    #  ARTEMIO 2/2 ATN requiring CRRT, then bumex challenge and now HD  - last HD 12/8, next HD scheduled for today 12/11  - renal following   - will trend electrolytes   - negro in place  - strict I’s and O’s    #ID  Covid 19 pneumonia, MSSA bacteremia/pneumonia, now cavitary consolidation on CT chest.  - s/p remdesivir and dexamethasone   - +Sputum/blood MSSA, + P mirablis in blood, +Stenotrophomonas in sputum   - Was previously on Cefazolin for MSSA bacteremia. Pt continues with fevers, ABX broaden to Fadia and Vanco on 12/11  - s/p Levofloxacin   - caspofungin discontinued, no fungal growth.   - Blood cx from 12/7 NGTD. Will follow up repeat cultures sent on 12/11    #Heme  -Anticoagulation with Heparin gtt     #Endo  - ISS q6hrs  - NPH q6hrs    # p/px  - DVT: hep gtt  - SUP: protonix and TF

## 2020-12-13 LAB
ALBUMIN SERPL ELPH-MCNC: 2.2 G/DL — LOW (ref 3.3–5)
ALP SERPL-CCNC: 84 U/L — SIGNIFICANT CHANGE UP (ref 40–120)
ALT FLD-CCNC: <5 U/L — LOW (ref 4–33)
AMMONIA BLD-MCNC: 66 UMOL/L — HIGH (ref 11–55)
ANION GAP SERPL CALC-SCNC: 14 MMOL/L — SIGNIFICANT CHANGE UP (ref 7–14)
APTT BLD: 65.6 SEC — HIGH (ref 27–36.3)
AST SERPL-CCNC: 29 U/L — SIGNIFICANT CHANGE UP (ref 4–32)
BASOPHILS # BLD AUTO: 0.03 K/UL — SIGNIFICANT CHANGE UP (ref 0–0.2)
BASOPHILS # BLD AUTO: 0.04 K/UL — SIGNIFICANT CHANGE UP (ref 0–0.2)
BASOPHILS NFR BLD AUTO: 0.5 % — SIGNIFICANT CHANGE UP (ref 0–2)
BASOPHILS NFR BLD AUTO: 0.6 % — SIGNIFICANT CHANGE UP (ref 0–2)
BILIRUB SERPL-MCNC: 0.8 MG/DL — SIGNIFICANT CHANGE UP (ref 0.2–1.2)
BLD GP AB SCN SERPL QL: NEGATIVE — SIGNIFICANT CHANGE UP
BLOOD GAS ARTERIAL COMPREHENSIVE RESULT: SIGNIFICANT CHANGE UP
BLOOD GAS ARTERIAL COMPREHENSIVE RESULT: SIGNIFICANT CHANGE UP
BUN SERPL-MCNC: 56 MG/DL — HIGH (ref 7–23)
CALCIUM SERPL-MCNC: 8.5 MG/DL — SIGNIFICANT CHANGE UP (ref 8.4–10.5)
CHLORIDE SERPL-SCNC: 97 MMOL/L — LOW (ref 98–107)
CO2 SERPL-SCNC: 22 MMOL/L — SIGNIFICANT CHANGE UP (ref 22–31)
CREAT SERPL-MCNC: 3.74 MG/DL — HIGH (ref 0.5–1.3)
EOSINOPHIL # BLD AUTO: 0.26 K/UL — SIGNIFICANT CHANGE UP (ref 0–0.5)
EOSINOPHIL # BLD AUTO: 0.31 K/UL — SIGNIFICANT CHANGE UP (ref 0–0.5)
EOSINOPHIL NFR BLD AUTO: 4.1 % — SIGNIFICANT CHANGE UP (ref 0–6)
EOSINOPHIL NFR BLD AUTO: 4.6 % — SIGNIFICANT CHANGE UP (ref 0–6)
GLUCOSE BLDC GLUCOMTR-MCNC: 146 MG/DL — HIGH (ref 70–99)
GLUCOSE BLDC GLUCOMTR-MCNC: 188 MG/DL — HIGH (ref 70–99)
GLUCOSE SERPL-MCNC: 182 MG/DL — HIGH (ref 70–99)
HBV CORE IGM SER-ACNC: SIGNIFICANT CHANGE UP
HBV SURFACE AB SER-ACNC: <3 MIU/ML — LOW
HCT VFR BLD CALC: 23.8 % — LOW (ref 34.5–45)
HCT VFR BLD CALC: 25.3 % — LOW (ref 34.5–45)
HCV AB S/CO SERPL IA: 0.16 S/CO — SIGNIFICANT CHANGE UP (ref 0–0.99)
HCV AB SERPL-IMP: SIGNIFICANT CHANGE UP
HGB BLD-MCNC: 7 G/DL — CRITICAL LOW (ref 11.5–15.5)
HGB BLD-MCNC: 7.6 G/DL — LOW (ref 11.5–15.5)
IANC: 2.99 K/UL — SIGNIFICANT CHANGE UP (ref 1.5–8.5)
IANC: 3.4 K/UL — SIGNIFICANT CHANGE UP (ref 1.5–8.5)
IMM GRANULOCYTES NFR BLD AUTO: 4 % — HIGH (ref 0–1.5)
IMM GRANULOCYTES NFR BLD AUTO: 4.8 % — HIGH (ref 0–1.5)
INR BLD: 1.64 RATIO — HIGH (ref 0.88–1.17)
LYMPHOCYTES # BLD AUTO: 1.16 K/UL — SIGNIFICANT CHANGE UP (ref 1–3.3)
LYMPHOCYTES # BLD AUTO: 18.1 % — SIGNIFICANT CHANGE UP (ref 13–44)
LYMPHOCYTES # BLD AUTO: 2.02 K/UL — SIGNIFICANT CHANGE UP (ref 1–3.3)
LYMPHOCYTES # BLD AUTO: 29.9 % — SIGNIFICANT CHANGE UP (ref 13–44)
MAGNESIUM SERPL-MCNC: 2 MG/DL — SIGNIFICANT CHANGE UP (ref 1.6–2.6)
MCHC RBC-ENTMCNC: 27.9 PG — SIGNIFICANT CHANGE UP (ref 27–34)
MCHC RBC-ENTMCNC: 28.9 PG — SIGNIFICANT CHANGE UP (ref 27–34)
MCHC RBC-ENTMCNC: 29.4 GM/DL — LOW (ref 32–36)
MCHC RBC-ENTMCNC: 30 GM/DL — LOW (ref 32–36)
MCV RBC AUTO: 93 FL — SIGNIFICANT CHANGE UP (ref 80–100)
MCV RBC AUTO: 98.3 FL — SIGNIFICANT CHANGE UP (ref 80–100)
MONOCYTES # BLD AUTO: 1.12 K/UL — HIGH (ref 0–0.9)
MONOCYTES # BLD AUTO: 1.24 K/UL — HIGH (ref 0–0.9)
MONOCYTES NFR BLD AUTO: 16.6 % — HIGH (ref 2–14)
MONOCYTES NFR BLD AUTO: 19.4 % — HIGH (ref 2–14)
NEUTROPHILS # BLD AUTO: 2.99 K/UL — SIGNIFICANT CHANGE UP (ref 1.8–7.4)
NEUTROPHILS # BLD AUTO: 3.4 K/UL — SIGNIFICANT CHANGE UP (ref 1.8–7.4)
NEUTROPHILS NFR BLD AUTO: 44.3 % — SIGNIFICANT CHANGE UP (ref 43–77)
NEUTROPHILS NFR BLD AUTO: 53.1 % — SIGNIFICANT CHANGE UP (ref 43–77)
NRBC # BLD: 2 /100 WBCS — SIGNIFICANT CHANGE UP
NRBC # BLD: 2 /100 WBCS — SIGNIFICANT CHANGE UP
NRBC # FLD: 0.1 K/UL — HIGH
NRBC # FLD: 0.11 K/UL — HIGH
PHOSPHATE SERPL-MCNC: 5.6 MG/DL — HIGH (ref 2.5–4.5)
PLATELET # BLD AUTO: 160 K/UL — SIGNIFICANT CHANGE UP (ref 150–400)
PLATELET # BLD AUTO: 170 K/UL — SIGNIFICANT CHANGE UP (ref 150–400)
POTASSIUM SERPL-MCNC: 4.9 MMOL/L — SIGNIFICANT CHANGE UP (ref 3.5–5.3)
POTASSIUM SERPL-SCNC: 4.9 MMOL/L — SIGNIFICANT CHANGE UP (ref 3.5–5.3)
PROT SERPL-MCNC: 6.2 G/DL — SIGNIFICANT CHANGE UP (ref 6–8.3)
PROTHROM AB SERPL-ACNC: 18.3 SEC — HIGH (ref 9.8–13.1)
RBC # BLD: 2.42 M/UL — LOW (ref 3.8–5.2)
RBC # BLD: 2.72 M/UL — LOW (ref 3.8–5.2)
RBC # FLD: 21.5 % — HIGH (ref 10.3–14.5)
RBC # FLD: 22.8 % — HIGH (ref 10.3–14.5)
RH IG SCN BLD-IMP: POSITIVE — SIGNIFICANT CHANGE UP
SODIUM SERPL-SCNC: 133 MMOL/L — LOW (ref 135–145)
WBC # BLD: 6.4 K/UL — SIGNIFICANT CHANGE UP (ref 3.8–10.5)
WBC # BLD: 6.75 K/UL — SIGNIFICANT CHANGE UP (ref 3.8–10.5)
WBC # FLD AUTO: 6.4 K/UL — SIGNIFICANT CHANGE UP (ref 3.8–10.5)
WBC # FLD AUTO: 6.75 K/UL — SIGNIFICANT CHANGE UP (ref 3.8–10.5)

## 2020-12-13 PROCEDURE — 43752 NASAL/OROGASTRIC W/TUBE PLMT: CPT

## 2020-12-13 PROCEDURE — 99233 SBSQ HOSP IP/OBS HIGH 50: CPT | Mod: GC

## 2020-12-13 PROCEDURE — 99291 CRITICAL CARE FIRST HOUR: CPT | Mod: 25

## 2020-12-13 PROCEDURE — 71045 X-RAY EXAM CHEST 1 VIEW: CPT | Mod: 26,59

## 2020-12-13 RX ORDER — PANTOPRAZOLE SODIUM 20 MG/1
40 TABLET, DELAYED RELEASE ORAL DAILY
Refills: 0 | Status: DISCONTINUED | OUTPATIENT
Start: 2020-12-13 | End: 2021-01-15

## 2020-12-13 RX ORDER — ACETAMINOPHEN 500 MG
1000 TABLET ORAL ONCE
Refills: 0 | Status: COMPLETED | OUTPATIENT
Start: 2020-12-13 | End: 2020-12-13

## 2020-12-13 RX ORDER — ACETAMINOPHEN 500 MG
1000 TABLET ORAL ONCE
Refills: 0 | Status: COMPLETED | OUTPATIENT
Start: 2020-12-13 | End: 2020-12-14

## 2020-12-13 RX ORDER — FENTANYL CITRATE 50 UG/ML
100 INJECTION INTRAVENOUS ONCE
Refills: 0 | Status: DISCONTINUED | OUTPATIENT
Start: 2020-12-13 | End: 2020-12-13

## 2020-12-13 RX ADMIN — FENTANYL CITRATE 1.15 MICROGRAM(S)/KG/HR: 50 INJECTION INTRAVENOUS at 20:29

## 2020-12-13 RX ADMIN — DEXMEDETOMIDINE HYDROCHLORIDE IN 0.9% SODIUM CHLORIDE 14.4 MICROGRAM(S)/KG/HR: 4 INJECTION INTRAVENOUS at 16:54

## 2020-12-13 RX ADMIN — DEXMEDETOMIDINE HYDROCHLORIDE IN 0.9% SODIUM CHLORIDE 14.4 MICROGRAM(S)/KG/HR: 4 INJECTION INTRAVENOUS at 20:28

## 2020-12-13 RX ADMIN — MIDODRINE HYDROCHLORIDE 30 MILLIGRAM(S): 2.5 TABLET ORAL at 14:14

## 2020-12-13 RX ADMIN — PANTOPRAZOLE SODIUM 40 MILLIGRAM(S): 20 TABLET, DELAYED RELEASE ORAL at 22:59

## 2020-12-13 RX ADMIN — MEROPENEM 100 MILLIGRAM(S): 1 INJECTION INTRAVENOUS at 14:14

## 2020-12-13 RX ADMIN — FENTANYL CITRATE 1.15 MICROGRAM(S)/KG/HR: 50 INJECTION INTRAVENOUS at 21:45

## 2020-12-13 RX ADMIN — HUMAN INSULIN 10 UNIT(S): 100 INJECTION, SUSPENSION SUBCUTANEOUS at 23:52

## 2020-12-13 RX ADMIN — FENTANYL CITRATE 100 MICROGRAM(S): 50 INJECTION INTRAVENOUS at 09:49

## 2020-12-13 RX ADMIN — Medication 400 MILLIGRAM(S): at 00:27

## 2020-12-13 RX ADMIN — DEXMEDETOMIDINE HYDROCHLORIDE IN 0.9% SODIUM CHLORIDE 14.4 MICROGRAM(S)/KG/HR: 4 INJECTION INTRAVENOUS at 07:52

## 2020-12-13 RX ADMIN — Medication 21.6 MICROGRAM(S)/KG/MIN: at 20:30

## 2020-12-13 RX ADMIN — Medication 400 MILLIGRAM(S): at 16:55

## 2020-12-13 RX ADMIN — HUMAN INSULIN 10 UNIT(S): 100 INJECTION, SUSPENSION SUBCUTANEOUS at 05:36

## 2020-12-13 RX ADMIN — CHLORHEXIDINE GLUCONATE 15 MILLILITER(S): 213 SOLUTION TOPICAL at 05:34

## 2020-12-13 RX ADMIN — CHLORHEXIDINE GLUCONATE 15 MILLILITER(S): 213 SOLUTION TOPICAL at 17:53

## 2020-12-13 RX ADMIN — MIDODRINE HYDROCHLORIDE 30 MILLIGRAM(S): 2.5 TABLET ORAL at 22:59

## 2020-12-13 RX ADMIN — FENTANYL CITRATE 1.15 MICROGRAM(S)/KG/HR: 50 INJECTION INTRAVENOUS at 14:14

## 2020-12-13 RX ADMIN — HUMAN INSULIN 10 UNIT(S): 100 INJECTION, SUSPENSION SUBCUTANEOUS at 11:49

## 2020-12-13 RX ADMIN — DEXMEDETOMIDINE HYDROCHLORIDE IN 0.9% SODIUM CHLORIDE 14.4 MICROGRAM(S)/KG/HR: 4 INJECTION INTRAVENOUS at 14:14

## 2020-12-13 RX ADMIN — POLYETHYLENE GLYCOL 3350 17 GRAM(S): 17 POWDER, FOR SOLUTION ORAL at 11:49

## 2020-12-13 RX ADMIN — CHLORHEXIDINE GLUCONATE 1 APPLICATION(S): 213 SOLUTION TOPICAL at 05:47

## 2020-12-13 RX ADMIN — Medication 1000 MILLIGRAM(S): at 01:00

## 2020-12-13 RX ADMIN — DEXMEDETOMIDINE HYDROCHLORIDE IN 0.9% SODIUM CHLORIDE 14.4 MICROGRAM(S)/KG/HR: 4 INJECTION INTRAVENOUS at 10:30

## 2020-12-13 RX ADMIN — Medication 21.6 MICROGRAM(S)/KG/MIN: at 09:31

## 2020-12-13 RX ADMIN — Medication 2: at 11:47

## 2020-12-13 RX ADMIN — Medication 2: at 17:54

## 2020-12-13 RX ADMIN — FENTANYL CITRATE 100 MICROGRAM(S): 50 INJECTION INTRAVENOUS at 14:10

## 2020-12-13 RX ADMIN — DEXMEDETOMIDINE HYDROCHLORIDE IN 0.9% SODIUM CHLORIDE 14.4 MICROGRAM(S)/KG/HR: 4 INJECTION INTRAVENOUS at 11:40

## 2020-12-13 RX ADMIN — SENNA PLUS 15 MILLILITER(S): 8.6 TABLET ORAL at 23:53

## 2020-12-13 RX ADMIN — MIDODRINE HYDROCHLORIDE 30 MILLIGRAM(S): 2.5 TABLET ORAL at 05:34

## 2020-12-13 RX ADMIN — Medication 2: at 05:35

## 2020-12-13 RX ADMIN — HUMAN INSULIN 10 UNIT(S): 100 INJECTION, SUSPENSION SUBCUTANEOUS at 17:55

## 2020-12-13 RX ADMIN — Medication 1000 MILLIGRAM(S): at 18:58

## 2020-12-13 NOTE — PROGRESS NOTE ADULT - PROBLEM SELECTOR PLAN 1
Pt with ARTEMIO in the setting of COVID-19. Pt. likely has ATN. Scr was WNL at 0.53 on 11/23/20 and progressively worsened to 3.0 on 11/27. Patient was initiated on CRRT at [facility] on 11/27/20 for oliguric ARTEMIO. CRRT was discontinued on 12/6/20 and transitioned to intermittent HD on 12/8/20. Pt. subsequently started on diuretics and remained non-oliguric until 12/11. Pt. however clinically volume overloaded on exam on 12/11/20 and underwent HD with 2L UF. Labs reviewed. No plan for HD today. Will assess need for HD daily. Consider trial of IV diuretics. Monitor labs and urine output. Avoid any potential nephrotoxins. Dose medications as per eGFR.     If you have any questions, please feel free to contact me  Carlos Jones  Nephrology Fellow  [phone]  (After 5pm or on weekends please page the on-call fellow) Pt. with ARTEMIO in the setting of COVID-19. Pt. likely has ATN. Scr was WNL at 0.53 on 11/23/20 and progressively worsened to 3.0 on 11/27. Patient was initiated on CRRT (at [facility]) on 11/27/20 for oliguric ARTEMIO. CRRT was discontinued on 12/6/20 and transitioned to intermittent HD on 12/8/20. Pt. subsequently started on diuretics and remained non-oliguric until 12/11. Pt. however clinically volume overloaded on exam on 12/11/20 and underwent HD with 2L UF. Labs reviewed. No plan for HD today. Will assess need for HD daily. Consider trial of IV diuretics. Monitor labs and urine output. Avoid any potential nephrotoxins. Dose medications as per eGFR.     If you have any questions, please feel free to contact me  Carlos Jones  Nephrology Fellow  [phone]  (After 5pm or on weekends please page the on-call fellow)

## 2020-12-13 NOTE — DIETITIAN INITIAL EVALUATION ADULT. - PERTINENT LABORATORY DATA
12-13 Na 133 mmol/L<L> Glu 182 mg/dL<H> K+ 4.9 mmol/L Cr 3.74 mg/dL<H> BUN 56 mg/dL<H> Phos 5.6 mg/dL<H>  12-13 @ 05:30 POCT 188 mg/dL  12-12 @ 23:45 POCT 207 mg/dL  12-12 @ 18:28 POCT 152 mg/dL  12-12 @ 12:46 POCT 207 mg/dL

## 2020-12-13 NOTE — PROGRESS NOTE ADULT - SUBJECTIVE AND OBJECTIVE BOX
Interval Events:   -febrile overnight  -anemic given 1u PRBC  tachypneic started on fentanyl gtt     HPI:  Patient is a 61yo F w/ AARON, peritonitis s/p Oral's procedure and ileostomy (reversed 2011), HTN, prior DVT/PE, ?Asthma admitted on 11/18/2020 to [facility] after presenting for  productive cough  w/ SOB x9 days and diarrhea x7 days and fever x1 day. Patient was found to be COVID + on 11/17/2020 and completed remdesivir 11/18-11/22. Patient was intubated for airway protection on 11/23/2020. Patient has required proning (last 12/4/2020). Hospital course c/b by ATN, requiring CVVHD now on intermittent HD, unresponsive to bumex challenge last HD 12/8. Course also c/b Stenotrophomonas (12/11, completed Levaquin x7 days), MSSA/P. mirablis bacteremia (on Cefazolin, potentially due to urine vs line-associated and MSSA in sputum due to PNA), also with lung abscesses on CT chest on 12/5.  Patient currently on Volume AC 30/350/8/35% on Precedex. Patient intermittently opens eyes but is not yet responsive. Transferred over to [facility] on 12/10 to offload [facility] COVID ICU    (10 Dec 2020 14:13)      REVIEW OF SYSTEMS:    [x ] Unable to assess ROS because  patient is intubated/sedated    OBJECTIVE:  ICU Vital Signs Last 24 Hrs  T(C): 39.6 (13 Dec 2020 15:00), Max: 40.3 (13 Dec 2020 04:00)  T(F): 103.3 (13 Dec 2020 15:00), Max: 104.5 (13 Dec 2020 04:00)  HR: 86 (13 Dec 2020 15:52) (82 - 109)  BP: --  BP(mean): --  ABP: 139/62 (13 Dec 2020 15:00) (112/53 - 147/79)  ABP(mean): 86 (13 Dec 2020 15:00) (71 - 99)  RR: 34 (13 Dec 2020 15:00) (31 - 40)  SpO2: 100% (13 Dec 2020 15:52) (94% - 100%)    Mode: AC/ CMV (Assist Control/ Continuous Mandatory Ventilation), RR (machine): 30, TV (machine): 370, FiO2: 40, PEEP: 8, ITime: 0.67, MAP: 20, PIP: 44    12-12 @ 07:01  -  12-13 @ 07:00  --------------------------------------------------------  IN: 1578.4 mL / OUT: 260 mL / NET: 1318.4 mL    12-13 @ 07:01 - 12-13 @ 16:52  --------------------------------------------------------  IN: 1558.4 mL / OUT: 69 mL / NET: 1489.4 mL      CAPILLARY BLOOD GLUCOSE      POCT Blood Glucose.: 188 mg/dL (13 Dec 2020 11:47)      Physical Exam:   Constitutional: ill appearing, no acute distress   HEENT: + PERRLA, EOMI, no drainage or redness  Neck: supple,  No JVD  Respiratory: Ventilator assisted breath Sounds, diminished bilaterally to auscultation, no accessory muscle use noted  Cardiovascular: Regular rate, regular rhythm, normal S1, S2; no murmurs or rub  Gastrointestinal: obese, soft, non-tender, non distended, no hepatosplenomegaly, normal bowel sounds  Extremities: + 2 peripheral edema, no cyanosis, no clubbing   Vascular: Equal and normal pulses: 2+ peripheral pulses throughout  Neurological: sedated/intubated  Psychiatric: calm, normal mood, normal affect  Musculoskeletal: No joint swelling or deformity; no limitation of movement  Skin: ulcer to chin area, warm, dry, well perfused, no rashes    HOSPITAL MEDICATIONS:  Standing Meds:  acetaminophen  IVPB .. 1000 milliGRAM(s) IV Intermittent once  chlorhexidine 0.12% Liquid 15 milliLiter(s) Oral Mucosa every 12 hours  chlorhexidine 4% Liquid 1 Application(s) Topical <User Schedule>  dexMEDEtomidine Infusion 0.5 MICROgram(s)/kG/Hr IV Continuous <Continuous>  fentaNYL   Infusion..... 0.5 MICROgram(s)/kG/Hr IV Continuous <Continuous>  heparin  Infusion. 1100 Unit(s)/Hr IV Continuous <Continuous>  insulin lispro (ADMELOG) corrective regimen sliding scale   SubCutaneous every 6 hours  insulin NPH human recombinant 10 Unit(s) SubCutaneous every 6 hours  meropenem  IVPB 500 milliGRAM(s) IV Intermittent every 24 hours  midodrine 30 milliGRAM(s) Oral every 8 hours  norepinephrine Infusion 0.1 MICROgram(s)/kG/Min IV Continuous <Continuous>  polyethylene glycol 3350 17 Gram(s) Oral daily  senna Syrup 15 milliLiter(s) Oral two times a day      PRN Meds:      LABS:                        7.0    6.40  )-----------( 160      ( 13 Dec 2020 03:30 )             23.8     Hgb Trend: 7.0<--, 7.3<--, 7.4<--, 8.9<--, 8.0<--  12-13    133<L>  |  97<L>  |  56<H>  ----------------------------<  182<H>  4.9   |  22  |  3.74<H>    Ca    8.5      13 Dec 2020 03:30  Phos  5.6     12-13  Mg     2.0     12-13    TPro  6.2  /  Alb  2.2<L>  /  TBili  0.8  /  DBili  x   /  AST  29  /  ALT  <5<L>  /  AlkPhos  84  12-13    Creatinine Trend: 3.74<--, 3.46<--, 2.88<--, 2.46<--, 2.52<--, 4.35<--  PT/INR - ( 13 Dec 2020 03:30 )   PT: 18.3 sec;   INR: 1.64 ratio         PTT - ( 13 Dec 2020 03:30 )  PTT:65.6 sec    Arterial Blood Gas:  12-13 @ 03:30  7.31/49/76/23/94.0/-1.5  ABG lactate: --  Arterial Blood Gas:  12-13 @ 00:30  7.32/47/78/23/94.3/-1.9  ABG lactate: --  Arterial Blood Gas:  12-12 @ 22:10  7.28/55/71/23/91.7/-1.1  ABG lactate: --  Arterial Blood Gas:  12-12 @ 04:58  7.40/39/92/24/97.2/-0.9  ABG lactate: --  Arterial Blood Gas:  12-11 @ 19:02  7.41/39/244/24/99.9/--  ABG lactate: --        MICROBIOLOGY:     Culture - Sputum (collected 12 Dec 2020 16:37)  Source: .Sputum Sputum  Gram Stain (12 Dec 2020 18:18):    Moderate polymorphonuclear leukocytes per low power field    Moderate Gram Negative Rods per oil power field    Culture - Blood (collected 11 Dec 2020 19:08)  Source: .Blood Blood-Peripheral  Preliminary Report (12 Dec 2020 20:01):    No growth to date.    Culture - Blood (collected 11 Dec 2020 19:08)  Source: .Blood Blood-Venous  Preliminary Report (12 Dec 2020 20:01):    No growth to date.    Culture - Urine (collected 11 Dec 2020 12:30)  Source: .Urine Catheterized  Final Report (12 Dec 2020 21:30):    No growth      RADIOLOGY:  [ ] Reviewed and interpreted by me

## 2020-12-13 NOTE — PROGRESS NOTE ADULT - ASSESSMENT
This is a 60 year old Female w/ pmh of HTN, DVT on Xarelto, peritonitis s/p Oral's procedure and ileostomy (reversed 2011), AARON who presented to [facility] on 11/18  w/ fevers found to have COVID-19, intubated 11/23, course complicated by superimposed PNA and bacteremia with Stenotrophomonas (12/11, completed Levaquin x7 days), MSSA/P. mirablis bacteremia (on Cefazolin, potentially due to urine vs line-associated and MSSA in sputum due to PNA), also with lung abscesses on CT chest on 12/5 , ARTEMIO/ATN requiring CRRT and HD. Now unable to wean from ventilator and transferred from [facility] on 12/10 to offload COVID unit       #Neuro  Alert & oriented x3 @baseline.   - Sedated on Precedex gtt added fentanyl overnight for tachypnea   - Pt current mental status most likely encephalopathic from renal failure and uremia  -CTH negative   -will check ammonia level today     #Resp  Acute hypoxemic respiratory failure 2/2 covid-19 PNA. Intubated on 11/23, now unable to wean from ventilator   - c/w AC 30/370/8/30% unable to tolerate PS trial today   -will continue Pressure support trails daily  -consulted for tracheostomy     #CV  Hx of HTN, DVT on Xarelto now in shock state requiring pressors most likely vasoplegic from sedatives   - Continue Midodrine at 30 TID  -c/w norepinephrine gtt and titrate to maintain MAP>65  - Continue heparin gtt, currently therapeutic   - CT negative for PE    #GI  - Continue TF, tolerating well  - Continue bowel regimen    #  ARTEMIO 2/2 ATN requiring CRRT, then bumex challenge and now HD  -last HD 12/11   -renal following   -will trend electrolytes   - negro in place  -strict I’s and O’s    #ID  Covid 19 pneumonia, MSSA bacteremia/pneumonia, now cavitary consolidation on CT chest.  - s/p remdesivir and dexamethasone   - + Sputum/blood MSSA, + P mirablis in blood, +Stenotrophomonas in sputum   - Was previously on Cefazolin for MSSA bacteremia. Pt continues with fevers, ABX broaden to Fadia and Vanco on 12/11  - s/p Levofloxacin   - caspofungin discontinued, no fungal growth.   - Blood cx from 12/7 NGTD. 12/12 sputum with gram negative rods   -c/w meropenem (12/11-  ) and f/u sensitivities     #Heme  Hx DVT on xarelto. Anemic overnight with no evidence of bleeding at this time   -s/p1u PRBC   -f/u post transfusion CBC   -Anticoagulation with Heparin gtt     #Endo  - ISS q6hrs  - NPH q6hrs

## 2020-12-13 NOTE — DIETITIAN INITIAL EVALUATION ADULT. - OTHER INFO
59 y/o female admitted with dx of respiratory failure w/ hypoxia 2/2 COVID+. Unable to conduct a face to face interview or nutrition-focused physical exam due to limited contact restrictions related to pt's medical condition and isolation precautions. Pt is intubated and sedated with precedex and fentanyl, unable to wean from vent at this time. Pt was transferred from [facility], previously on CRRT for ARTEMIO, d/shagufta 12/8, transitioned to HD 12/8 and 12/11.  Pt receiving and tolerating enteral feeding of Nepro with Carb Steady @ 30 ml/hr x 24 hrs for a total volume of 720 ml/day which is meeting pt's estimated caloric need and is providing 1296 kcals and 58 gms protein/day. To assist with meeting pt's estimated protein need, recommend No Carb Prosource protein pkg x 3/day (15 gms protein/pkg) which will give pt a daily protein amount of 103 gms. Pt noted to have 3+ generalized edema. Also with unstageable chin and Stage 2 sacral pressure injuries. Daily weights taken as pt is fluid overloaded with need for HD prn. RDN services to remain available as needed.

## 2020-12-13 NOTE — PROGRESS NOTE ADULT - ATTENDING COMMENTS
Htn, DVT on xarelto, h/o as above here with acute hypoxemic respiratory failure due to COVID19, then with progressive acute hypoxemic and hypercapnic respiratory failure due to COVID 19 and ARDS requiring intubation, c/b MSSA bacteremia, lung abscess, now with ARTEMIO requiring CVVHD->intermittent HD. No urgent need for HD today. Of note, just prior to transfer to [facility], she was given bumex pushes and had no urine output.  Wean fentanyl as tolerated.   May need to start phenobarb.  Check ammonia.

## 2020-12-13 NOTE — DIETITIAN INITIAL EVALUATION ADULT. - PERTINENT MEDS FT
MEDICATIONS  (STANDING):  chlorhexidine 0.12% Liquid 15 milliLiter(s) Oral Mucosa every 12 hours  chlorhexidine 4% Liquid 1 Application(s) Topical <User Schedule>  dexMEDEtomidine Infusion 0.5 MICROgram(s)/kG/Hr (14.4 mL/Hr) IV Continuous <Continuous>  fentaNYL   Infusion..... 0.5 MICROgram(s)/kG/Hr (1.15 mL/Hr) IV Continuous <Continuous>  heparin  Infusion. 1100 Unit(s)/Hr (11 mL/Hr) IV Continuous <Continuous>  insulin lispro (ADMELOG) corrective regimen sliding scale   SubCutaneous every 6 hours  insulin NPH human recombinant 10 Unit(s) SubCutaneous every 6 hours  meropenem  IVPB 500 milliGRAM(s) IV Intermittent every 24 hours  midodrine 30 milliGRAM(s) Oral every 8 hours  norepinephrine Infusion 0.1 MICROgram(s)/kG/Min (21.6 mL/Hr) IV Continuous <Continuous>  polyethylene glycol 3350 17 Gram(s) Oral daily  senna Syrup 15 milliLiter(s) Oral two times a day

## 2020-12-13 NOTE — PROGRESS NOTE ADULT - SUBJECTIVE AND OBJECTIVE BOX
[facility] Division of Kidney Diseases & Hypertension  FOLLOW UP NOTE  [phone]--------------------------------------------------------------------------------    Chief Complaint: ARTEMIO in the setting of COVID-19, on RRT/dialysis.    24 hour events/subjective: Pt. was seen and examined at bedside today in the CTICU. Pt. transferred from [facility] on 12/10/20. Pt. currently intubated and on low dose vasopressor to maintain MAP. Pt. currently oliguric. Pt. underwent HD on 12/13/20 with 2L UF. Pt. currently oliguric with 260cc UOP in 24 hours.    PAST HISTORY  --------------------------------------------------------------------------------  No significant changes to PMH, PSH, FHx, SHx, unless otherwise noted    ALLERGIES & MEDICATIONS  --------------------------------------------------------------------------------  Allergies    Kiwi (Unknown)  latex (Anaphylaxis)  latex (Unknown)  penicillin (Other)  penicillin (Unknown)  perfume  hives (Other)  potassium acetate (Other)  soap additives hives (Other)    Intolerances    Standing Inpatient Medications  chlorhexidine 0.12% Liquid 15 milliLiter(s) Oral Mucosa every 12 hours  chlorhexidine 4% Liquid 1 Application(s) Topical <User Schedule>  dexMEDEtomidine Infusion 0.5 MICROgram(s)/kG/Hr IV Continuous <Continuous>  fentaNYL   Infusion..... 0.5 MICROgram(s)/kG/Hr IV Continuous <Continuous>  heparin  Infusion. 1100 Unit(s)/Hr IV Continuous <Continuous>  insulin lispro (ADMELOG) corrective regimen sliding scale   SubCutaneous every 6 hours  insulin NPH human recombinant 10 Unit(s) SubCutaneous every 6 hours  meropenem  IVPB 500 milliGRAM(s) IV Intermittent every 24 hours  midodrine 30 milliGRAM(s) Oral every 8 hours  norepinephrine Infusion 0.1 MICROgram(s)/kG/Min IV Continuous <Continuous>  polyethylene glycol 3350 17 Gram(s) Oral daily  senna Syrup 15 milliLiter(s) Oral two times a day    REVIEW OF SYSTEMS  --------------------------------------------------------------------------------  Unable to obtain due to medical condition    VITALS/PHYSICAL EXAM  --------------------------------------------------------------------------------  T(C): 39.7 (12-13-20 @ 09:10), Max: 40.3 (12-13-20 @ 04:00)  HR: 85 (12-13-20 @ 09:16) (77 - 109)  BP: 111/34 (12-12-20 @ 12:00) (111/34 - 129/42)  RR: 36 (12-13-20 @ 09:16) (24 - 40)  SpO2: 99% (12-13-20 @ 09:16) (91% - 100%)  Wt(kg): --    12-12-20 @ 07:01  -  12-13-20 @ 07:00  --------------------------------------------------------  IN: 1578.4 mL / OUT: 260 mL / NET: 1318.4 mL    12-13-20 @ 07:01  -  12-13-20 @ 10:09  --------------------------------------------------------  IN: 568.9 mL / OUT: 42 mL / NET: 526.9 mL    Physical Exam:              Gen: Intubated  	HEENT: ETT +  	Pulm: Fair air entry B/L  	CV: S1S2+  	Abd: Soft, +BS   	Ext: No LE edema B/L  	Neuro: sedated  	Vascular access: Right IJ non-tunneled HD catheter site : No bleeding seen    LABS/STUDIES  --------------------------------------------------------------------------------              7.0    6.40  >-----------<  160      [12-13-20 @ 03:30]              23.8     133  |  97  |  56  ----------------------------<  182      [12-13-20 @ 03:30]  4.9   |  22  |  3.74        Ca     8.5     [12-13-20 @ 03:30]      Mg     2.0     [12-13-20 @ 03:30]      Phos  5.6     [12-13-20 @ 03:30]    Creatinine Trend:  SCr 3.74 [12-13 @ 03:30]  SCr 3.46 [12-12 @ 22:10]  SCr 2.88 [12-12 @ 04:58]  SCr 2.46 [12-11 @ 21:51]  SCr 2.52 [12-11 @ 19:02] [facility] Division of Kidney Diseases & Hypertension  FOLLOW UP NOTE  [phone]--------------------------------------------------------------------------------    Chief Complaint: ARTEMIO in the setting of COVID-19, on RRT/dialysis.    24 hour events/subjective: Pt. was seen and examined at bedside today in the CTICU. Pt. transferred from [facility] on 12/10/20. Pt. currently intubated and on low dose vasopressor to maintain MAP. Pt. currently oliguric. Pt. underwent HD on 12/11/20 with 2L UF. Pt. currently oliguric with 260cc UOP in 24 hours.    PAST HISTORY  --------------------------------------------------------------------------------  No significant changes to PMH, PSH, FHx, SHx, unless otherwise noted    ALLERGIES & MEDICATIONS  --------------------------------------------------------------------------------  Allergies    Kiwi (Unknown)  latex (Anaphylaxis)  latex (Unknown)  penicillin (Other)  penicillin (Unknown)  perfume  hives (Other)  potassium acetate (Other)  soap additives hives (Other)    Intolerances    Standing Inpatient Medications  chlorhexidine 0.12% Liquid 15 milliLiter(s) Oral Mucosa every 12 hours  chlorhexidine 4% Liquid 1 Application(s) Topical <User Schedule>  dexMEDEtomidine Infusion 0.5 MICROgram(s)/kG/Hr IV Continuous <Continuous>  fentaNYL   Infusion..... 0.5 MICROgram(s)/kG/Hr IV Continuous <Continuous>  heparin  Infusion. 1100 Unit(s)/Hr IV Continuous <Continuous>  insulin lispro (ADMELOG) corrective regimen sliding scale   SubCutaneous every 6 hours  insulin NPH human recombinant 10 Unit(s) SubCutaneous every 6 hours  meropenem  IVPB 500 milliGRAM(s) IV Intermittent every 24 hours  midodrine 30 milliGRAM(s) Oral every 8 hours  norepinephrine Infusion 0.1 MICROgram(s)/kG/Min IV Continuous <Continuous>  polyethylene glycol 3350 17 Gram(s) Oral daily  senna Syrup 15 milliLiter(s) Oral two times a day    REVIEW OF SYSTEMS  --------------------------------------------------------------------------------  Unable to obtain due to medical condition    VITALS/PHYSICAL EXAM  --------------------------------------------------------------------------------  T(C): 39.7 (12-13-20 @ 09:10), Max: 40.3 (12-13-20 @ 04:00)  HR: 85 (12-13-20 @ 09:16) (77 - 109)  BP: 111/34 (12-12-20 @ 12:00) (111/34 - 129/42)  RR: 36 (12-13-20 @ 09:16) (24 - 40)  SpO2: 99% (12-13-20 @ 09:16) (91% - 100%)  Wt(kg): --    12-12-20 @ 07:01  -  12-13-20 @ 07:00  --------------------------------------------------------  IN: 1578.4 mL / OUT: 260 mL / NET: 1318.4 mL    12-13-20 @ 07:01  -  12-13-20 @ 10:09  --------------------------------------------------------  IN: 568.9 mL / OUT: 42 mL / NET: 526.9 mL    Physical Exam:              Gen: Intubated  	HEENT: ETT +  	Pulm: Fair air entry B/L  	CV: S1S2+  	Abd: Soft, +BS   	Ext: No LE edema B/L  	Neuro: sedated  	Vascular access: Right IJ non-tunneled HD catheter site : No bleeding seen    LABS/STUDIES  --------------------------------------------------------------------------------              7.0    6.40  >-----------<  160      [12-13-20 @ 03:30]              23.8     133  |  97  |  56  ----------------------------<  182      [12-13-20 @ 03:30]  4.9   |  22  |  3.74        Ca     8.5     [12-13-20 @ 03:30]      Mg     2.0     [12-13-20 @ 03:30]      Phos  5.6     [12-13-20 @ 03:30]    Creatinine Trend:  SCr 3.74 [12-13 @ 03:30]  SCr 3.46 [12-12 @ 22:10]  SCr 2.88 [12-12 @ 04:58]  SCr 2.46 [12-11 @ 21:51]  SCr 2.52 [12-11 @ 19:02]

## 2020-12-13 NOTE — DIETITIAN INITIAL EVALUATION ADULT. - ADD RECOMMEND
1). Monitor weights, labs, BM's, skin integrity and edema. 2). Additional free water per MD discretion. 3). Further adjustments to rate/volume/duration/free water provision of enteral feeds will be determined in accordance with patient tolerance, needs and weight trends. 4). Follow pt as per protocol.

## 2020-12-14 LAB
-  CEFTAZIDIME: SIGNIFICANT CHANGE UP
-  LEVOFLOXACIN: SIGNIFICANT CHANGE UP
-  TRIMETHOPRIM/SULFAMETHOXAZOLE: SIGNIFICANT CHANGE UP
ALBUMIN SERPL ELPH-MCNC: 2.4 G/DL — LOW (ref 3.3–5)
ALP SERPL-CCNC: 76 U/L — SIGNIFICANT CHANGE UP (ref 40–120)
ALT FLD-CCNC: <5 U/L — LOW (ref 4–33)
ANION GAP SERPL CALC-SCNC: 18 MMOL/L — HIGH (ref 7–14)
APTT BLD: 65.4 SEC — HIGH (ref 27–36.3)
AST SERPL-CCNC: 28 U/L — SIGNIFICANT CHANGE UP (ref 4–32)
BASOPHILS # BLD AUTO: 0.03 K/UL — SIGNIFICANT CHANGE UP (ref 0–0.2)
BASOPHILS # BLD AUTO: 0.18 K/UL — SIGNIFICANT CHANGE UP (ref 0–0.2)
BASOPHILS NFR BLD AUTO: 0.5 % — SIGNIFICANT CHANGE UP (ref 0–2)
BASOPHILS NFR BLD AUTO: 3 % — HIGH (ref 0–2)
BILIRUB SERPL-MCNC: 1.1 MG/DL — SIGNIFICANT CHANGE UP (ref 0.2–1.2)
BLOOD GAS ARTERIAL COMPREHENSIVE RESULT: SIGNIFICANT CHANGE UP
BUN SERPL-MCNC: 66 MG/DL — HIGH (ref 7–23)
CALCIUM SERPL-MCNC: 8.6 MG/DL — SIGNIFICANT CHANGE UP (ref 8.4–10.5)
CHLORIDE SERPL-SCNC: 98 MMOL/L — SIGNIFICANT CHANGE UP (ref 98–107)
CO2 SERPL-SCNC: 18 MMOL/L — LOW (ref 22–31)
CREAT SERPL-MCNC: 4.35 MG/DL — HIGH (ref 0.5–1.3)
CULTURE RESULTS: SIGNIFICANT CHANGE UP
EOSINOPHIL # BLD AUTO: 0.24 K/UL — SIGNIFICANT CHANGE UP (ref 0–0.5)
EOSINOPHIL # BLD AUTO: 0.53 K/UL — HIGH (ref 0–0.5)
EOSINOPHIL NFR BLD AUTO: 4 % — SIGNIFICANT CHANGE UP (ref 0–6)
EOSINOPHIL NFR BLD AUTO: 8 % — HIGH (ref 0–6)
GLUCOSE BLDC GLUCOMTR-MCNC: 119 MG/DL — HIGH (ref 70–99)
GLUCOSE BLDC GLUCOMTR-MCNC: 134 MG/DL — HIGH (ref 70–99)
GLUCOSE BLDC GLUCOMTR-MCNC: 176 MG/DL — HIGH (ref 70–99)
GLUCOSE BLDC GLUCOMTR-MCNC: 228 MG/DL — HIGH (ref 70–99)
GLUCOSE SERPL-MCNC: 192 MG/DL — HIGH (ref 70–99)
HCT VFR BLD CALC: 25.4 % — LOW (ref 34.5–45)
HCT VFR BLD CALC: 25.4 % — LOW (ref 34.5–45)
HGB BLD-MCNC: 7.7 G/DL — LOW (ref 11.5–15.5)
HGB BLD-MCNC: 7.8 G/DL — LOW (ref 11.5–15.5)
IANC: 2.96 K/UL — SIGNIFICANT CHANGE UP (ref 1.5–8.5)
IANC: 3.49 K/UL — SIGNIFICANT CHANGE UP (ref 1.5–8.5)
IMM GRANULOCYTES NFR BLD AUTO: 5.4 % — HIGH (ref 0–1.5)
INR BLD: 1.51 RATIO — HIGH (ref 0.88–1.17)
LYMPHOCYTES # BLD AUTO: 1.16 K/UL — SIGNIFICANT CHANGE UP (ref 1–3.3)
LYMPHOCYTES # BLD AUTO: 1.43 K/UL — SIGNIFICANT CHANGE UP (ref 1–3.3)
LYMPHOCYTES # BLD AUTO: 19 % — SIGNIFICANT CHANGE UP (ref 13–44)
LYMPHOCYTES # BLD AUTO: 21.6 % — SIGNIFICANT CHANGE UP (ref 13–44)
MAGNESIUM SERPL-MCNC: 1.9 MG/DL — SIGNIFICANT CHANGE UP (ref 1.6–2.6)
MCHC RBC-ENTMCNC: 28 PG — SIGNIFICANT CHANGE UP (ref 27–34)
MCHC RBC-ENTMCNC: 28.7 PG — SIGNIFICANT CHANGE UP (ref 27–34)
MCHC RBC-ENTMCNC: 30.3 GM/DL — LOW (ref 32–36)
MCHC RBC-ENTMCNC: 30.7 GM/DL — LOW (ref 32–36)
MCV RBC AUTO: 92.4 FL — SIGNIFICANT CHANGE UP (ref 80–100)
MCV RBC AUTO: 93.4 FL — SIGNIFICANT CHANGE UP (ref 80–100)
METHOD TYPE: SIGNIFICANT CHANGE UP
MONOCYTES # BLD AUTO: 0.78 K/UL — SIGNIFICANT CHANGE UP (ref 0–0.9)
MONOCYTES # BLD AUTO: 1.04 K/UL — HIGH (ref 0–0.9)
MONOCYTES NFR BLD AUTO: 11.8 % — SIGNIFICANT CHANGE UP (ref 2–14)
MONOCYTES NFR BLD AUTO: 17 % — HIGH (ref 2–14)
NEUTROPHILS # BLD AUTO: 3.37 K/UL — SIGNIFICANT CHANGE UP (ref 1.8–7.4)
NEUTROPHILS # BLD AUTO: 3.49 K/UL — SIGNIFICANT CHANGE UP (ref 1.8–7.4)
NEUTROPHILS NFR BLD AUTO: 50 % — SIGNIFICANT CHANGE UP (ref 43–77)
NEUTROPHILS NFR BLD AUTO: 52.7 % — SIGNIFICANT CHANGE UP (ref 43–77)
NRBC # BLD: 1 /100 WBCS — SIGNIFICANT CHANGE UP
NRBC # FLD: 0.09 K/UL — HIGH
ORGANISM # SPEC MICROSCOPIC CNT: SIGNIFICANT CHANGE UP
ORGANISM # SPEC MICROSCOPIC CNT: SIGNIFICANT CHANGE UP
PHOSPHATE SERPL-MCNC: 5.1 MG/DL — HIGH (ref 2.5–4.5)
PLATELET # BLD AUTO: 173 K/UL — SIGNIFICANT CHANGE UP (ref 150–400)
PLATELET # BLD AUTO: 179 K/UL — SIGNIFICANT CHANGE UP (ref 150–400)
POTASSIUM SERPL-MCNC: 4.3 MMOL/L — SIGNIFICANT CHANGE UP (ref 3.5–5.3)
POTASSIUM SERPL-SCNC: 4.3 MMOL/L — SIGNIFICANT CHANGE UP (ref 3.5–5.3)
PROT SERPL-MCNC: 6.1 G/DL — SIGNIFICANT CHANGE UP (ref 6–8.3)
PROTHROM AB SERPL-ACNC: 17 SEC — HIGH (ref 9.8–13.1)
RBC # BLD: 2.72 M/UL — LOW (ref 3.8–5.2)
RBC # BLD: 2.75 M/UL — LOW (ref 3.8–5.2)
RBC # FLD: 22.6 % — HIGH (ref 10.3–14.5)
RBC # FLD: 22.8 % — HIGH (ref 10.3–14.5)
SODIUM SERPL-SCNC: 134 MMOL/L — LOW (ref 135–145)
SPECIMEN SOURCE: SIGNIFICANT CHANGE UP
WBC # BLD: 6.12 K/UL — SIGNIFICANT CHANGE UP (ref 3.8–10.5)
WBC # BLD: 6.62 K/UL — SIGNIFICANT CHANGE UP (ref 3.8–10.5)
WBC # FLD AUTO: 6.12 K/UL — SIGNIFICANT CHANGE UP (ref 3.8–10.5)
WBC # FLD AUTO: 6.62 K/UL — SIGNIFICANT CHANGE UP (ref 3.8–10.5)

## 2020-12-14 PROCEDURE — 99291 CRITICAL CARE FIRST HOUR: CPT

## 2020-12-14 PROCEDURE — 99233 SBSQ HOSP IP/OBS HIGH 50: CPT | Mod: GC

## 2020-12-14 RX ORDER — METHADONE HYDROCHLORIDE 40 MG/1
10 TABLET ORAL THREE TIMES A DAY
Refills: 0 | Status: DISCONTINUED | OUTPATIENT
Start: 2020-12-14 | End: 2020-12-15

## 2020-12-14 RX ADMIN — DEXMEDETOMIDINE HYDROCHLORIDE IN 0.9% SODIUM CHLORIDE 14.4 MICROGRAM(S)/KG/HR: 4 INJECTION INTRAVENOUS at 18:49

## 2020-12-14 RX ADMIN — POLYETHYLENE GLYCOL 3350 17 GRAM(S): 17 POWDER, FOR SOLUTION ORAL at 11:27

## 2020-12-14 RX ADMIN — DEXMEDETOMIDINE HYDROCHLORIDE IN 0.9% SODIUM CHLORIDE 14.4 MICROGRAM(S)/KG/HR: 4 INJECTION INTRAVENOUS at 11:25

## 2020-12-14 RX ADMIN — DEXMEDETOMIDINE HYDROCHLORIDE IN 0.9% SODIUM CHLORIDE 14.4 MICROGRAM(S)/KG/HR: 4 INJECTION INTRAVENOUS at 17:25

## 2020-12-14 RX ADMIN — HUMAN INSULIN 10 UNIT(S): 100 INJECTION, SUSPENSION SUBCUTANEOUS at 11:26

## 2020-12-14 RX ADMIN — MIDODRINE HYDROCHLORIDE 30 MILLIGRAM(S): 2.5 TABLET ORAL at 06:27

## 2020-12-14 RX ADMIN — METHADONE HYDROCHLORIDE 10 MILLIGRAM(S): 40 TABLET ORAL at 14:23

## 2020-12-14 RX ADMIN — PANTOPRAZOLE SODIUM 40 MILLIGRAM(S): 20 TABLET, DELAYED RELEASE ORAL at 11:26

## 2020-12-14 RX ADMIN — Medication 1000 MILLIGRAM(S): at 01:00

## 2020-12-14 RX ADMIN — HUMAN INSULIN 10 UNIT(S): 100 INJECTION, SUSPENSION SUBCUTANEOUS at 17:21

## 2020-12-14 RX ADMIN — METHADONE HYDROCHLORIDE 10 MILLIGRAM(S): 40 TABLET ORAL at 22:00

## 2020-12-14 RX ADMIN — FENTANYL CITRATE 1.15 MICROGRAM(S)/KG/HR: 50 INJECTION INTRAVENOUS at 18:49

## 2020-12-14 RX ADMIN — CHLORHEXIDINE GLUCONATE 1 APPLICATION(S): 213 SOLUTION TOPICAL at 06:27

## 2020-12-14 RX ADMIN — DEXMEDETOMIDINE HYDROCHLORIDE IN 0.9% SODIUM CHLORIDE 14.4 MICROGRAM(S)/KG/HR: 4 INJECTION INTRAVENOUS at 07:00

## 2020-12-14 RX ADMIN — SENNA PLUS 15 MILLILITER(S): 8.6 TABLET ORAL at 06:31

## 2020-12-14 RX ADMIN — Medication 2: at 11:25

## 2020-12-14 RX ADMIN — SENNA PLUS 15 MILLILITER(S): 8.6 TABLET ORAL at 17:21

## 2020-12-14 RX ADMIN — MIDODRINE HYDROCHLORIDE 30 MILLIGRAM(S): 2.5 TABLET ORAL at 22:00

## 2020-12-14 RX ADMIN — Medication 4: at 06:23

## 2020-12-14 RX ADMIN — MIDODRINE HYDROCHLORIDE 30 MILLIGRAM(S): 2.5 TABLET ORAL at 14:10

## 2020-12-14 RX ADMIN — HUMAN INSULIN 10 UNIT(S): 100 INJECTION, SUSPENSION SUBCUTANEOUS at 23:34

## 2020-12-14 RX ADMIN — DEXMEDETOMIDINE HYDROCHLORIDE IN 0.9% SODIUM CHLORIDE 14.4 MICROGRAM(S)/KG/HR: 4 INJECTION INTRAVENOUS at 15:30

## 2020-12-14 RX ADMIN — FENTANYL CITRATE 1.15 MICROGRAM(S)/KG/HR: 50 INJECTION INTRAVENOUS at 11:26

## 2020-12-14 RX ADMIN — CHLORHEXIDINE GLUCONATE 15 MILLILITER(S): 213 SOLUTION TOPICAL at 17:21

## 2020-12-14 RX ADMIN — CHLORHEXIDINE GLUCONATE 15 MILLILITER(S): 213 SOLUTION TOPICAL at 06:26

## 2020-12-14 RX ADMIN — DEXMEDETOMIDINE HYDROCHLORIDE IN 0.9% SODIUM CHLORIDE 14.4 MICROGRAM(S)/KG/HR: 4 INJECTION INTRAVENOUS at 09:00

## 2020-12-14 RX ADMIN — Medication 400 MILLIGRAM(S): at 00:36

## 2020-12-14 RX ADMIN — MEROPENEM 100 MILLIGRAM(S): 1 INJECTION INTRAVENOUS at 15:12

## 2020-12-14 RX ADMIN — HUMAN INSULIN 10 UNIT(S): 100 INJECTION, SUSPENSION SUBCUTANEOUS at 06:24

## 2020-12-14 NOTE — PROGRESS NOTE ADULT - SUBJECTIVE AND OBJECTIVE BOX
[facility] DIVISION OF KIDNEY DISEASES AND HYPERTENSION -- FOLLOW UP NOTE  --------------------------------------------------------------------------------  Chief Complaint: ARTEMIO in the setting of COVID-19, on RRT/dialysis.    24 hour events/subjective: Pt. was seen and examined at bedside today in the CTICU. Pt. transferred from [facility] on 12/10/20. Pt. currently intubated and on IV vasopressor to maintain MAP. Pt. currently oliguric. Pt. underwent HD on 12/11/20 with 2L UF. Pt. currently oliguric with ~ 240cc UOP in 24 hours.    PAST HISTORY  --------------------------------------------------------------------------------  No significant changes to PMH, PSH, FHx, SHx, unless otherwise noted    ALLERGIES & MEDICATIONS  --------------------------------------------------------------------------------  Allergies    Kiwi (Unknown)  latex (Anaphylaxis)  latex (Unknown)  penicillin (Other)  penicillin (Unknown)  perfume  hives (Other)  potassium acetate (Other)  soap additives hives (Other)    Intolerances    Standing Inpatient Medications  chlorhexidine 0.12% Liquid 15 milliLiter(s) Oral Mucosa every 12 hours  chlorhexidine 4% Liquid 1 Application(s) Topical <User Schedule>  dexMEDEtomidine Infusion 0.5 MICROgram(s)/kG/Hr IV Continuous <Continuous>  fentaNYL   Infusion..... 0.5 MICROgram(s)/kG/Hr IV Continuous <Continuous>  heparin  Infusion. 1100 Unit(s)/Hr IV Continuous <Continuous>  insulin lispro (ADMELOG) corrective regimen sliding scale   SubCutaneous every 6 hours  insulin NPH human recombinant 10 Unit(s) SubCutaneous every 6 hours  meropenem  IVPB 500 milliGRAM(s) IV Intermittent every 24 hours  midodrine 30 milliGRAM(s) Oral every 8 hours  norepinephrine Infusion 0.1 MICROgram(s)/kG/Min IV Continuous <Continuous>  pantoprazole  Injectable 40 milliGRAM(s) IV Push daily  polyethylene glycol 3350 17 Gram(s) Oral daily  senna Syrup 15 milliLiter(s) Oral two times a day    REVIEW OF SYSTEMS  --------------------------------------------------------------------------------  Unable to obtain due to medical condition    VITALS/PHYSICAL EXAM  --------------------------------------------------------------------------------  T(C): 37.1 (12-14-20 @ 08:00), Max: 39.7 (12-13-20 @ 09:00)  HR: 64 (12-14-20 @ 08:00) (60 - 89)  BP: --  RR: 28 (12-14-20 @ 08:00) (26 - 37)  SpO2: 96% (12-14-20 @ 08:00) (93% - 100%)  Wt(kg): --    12-13-20 @ 07:01  -  12-14-20 @ 07:00  --------------------------------------------------------  IN: 2748.8 mL / OUT: 234 mL / NET: 2514.8 mL    12-14-20 @ 07:01  -  12-14-20 @ 08:46  --------------------------------------------------------  IN: 124.4 mL / OUT: 33 mL / NET: 91.4 mL    Physical Exam:  	 Gen: Intubated  	HEENT: ETT +  	Pulm: Fair air entry B/L  	CV: S1S2+  	Abd: Soft, +BS   	Ext: trace LE edema B/L  	Neuro: sedated  	Vascular access: Right IJ non-tunneled HD catheter site : No bleeding seen    LABS/STUDIES  --------------------------------------------------------------------------------              7.7    6.12  >-----------<  173      [12-14-20 @ 04:13]              25.4     134  |  98  |  66  ----------------------------<  192      [12-14-20 @ 04:13]  4.3   |  18  |  4.35        Ca     8.6     [12-14-20 @ 04:13]      Mg     1.9     [12-14-20 @ 04:13]      Phos  5.1     [12-14-20 @ 04:13]    Creatinine Trend:  SCr 4.35 [12-14 @ 04:13]  SCr 3.74 [12-13 @ 03:30]  SCr 3.46 [12-12 @ 22:10]  SCr 2.88 [12-12 @ 04:58]  SCr 2.46 [12-11 @ 21:51]

## 2020-12-14 NOTE — PROGRESS NOTE ADULT - ATTENDING COMMENTS
61 y/o F with PMH as above presented to the hospital with shortness of breath on 11/18, developed progressive hypoxic respiratory failure requiring intubation on 11/23.  Patient transferred 59 y/o F with PMH as above presented to the hospital with shortness of breath on 11/18, developed progressive hypoxic respiratory failure requiring intubation on 11/23.  Patient transferred to [facility] on 12/10.  Hospital course c/b lung abscess, MSSA bacteremia, now with ARTEMIO requiring HD, plan for a session today. Patient with Stenotrophomonas in the sputum, on antibiotics, follow up with ID.  Will wean sedation as tolerate, attempt PSV trial.  Prognosis guarded.

## 2020-12-14 NOTE — PROGRESS NOTE ADULT - PROBLEM SELECTOR PLAN 1
Pt. with ARTEMIO in the setting of COVID-19. Pt. likely has ATN. Scr was WNL at 0.53 on 11/23/20 and progressively worsened to 3.0 on 11/27. Patient was initiated on CRRT (at [facility]) on 11/27/20 for oliguric ARTEMIO. CRRT was discontinued on 12/6/20 and transitioned to intermittent HD on 12/8/20. Pt. subsequently started on diuretics and remained non-oliguric until 12/11. Pt. however clinically volume overloaded on exam on 12/11/20 and underwent HD with 2L UF. Labs reviewed. Will arrange for HD today as per discussion with CTICU. Will assess need for HD daily. Monitor labs and urine output. Avoid any potential nephrotoxins. Dose medications as per eGFR.     If any questions, please feel free to contact me  Hermann Presley   Nephrology Fellow  [phone]  (After 5 pm or on weekends please page the on-call fellow)

## 2020-12-14 NOTE — PROGRESS NOTE ADULT - SUBJECTIVE AND OBJECTIVE BOX
Interval Events:   -febrile overnight to 102.9     HPI:  Patient is a 59yo F w/ AARON, peritonitis s/p Oral's procedure and ileostomy (reversed 2011), HTN, prior DVT/PE, ?Asthma admitted on 11/18/2020 to [facility] after presenting for  productive cough  w/ SOB x9 days and diarrhea x7 days and fever x1 day. Patient was found to be COVID + on 11/17/2020 and completed remdesivir 11/18-11/22. Patient was intubated for airway protection on 11/23/2020. Patient has required proning (last 12/4/2020). Hospital course c/b by ATN, requiring CVVHD now on intermittent HD, unresponsive to bumex challenge last HD 12/8. Course also c/b Stenotrophomonas (12/11, completed Levaquin x7 days), MSSA/P. mirablis bacteremia (on Cefazolin, potentially due to urine vs line-associated and MSSA in sputum due to PNA), also with lung abscesses on CT chest on 12/5.  Patient currently on Volume AC 30/350/8/35% on Precedex. Patient intermittently opens eyes but is not yet responsive. Transferred over to [facility] on 12/10 to offload [facility] COVID ICU    (10 Dec 2020 14:13)      REVIEW OF SYSTEMS:    [x ] Unable to assess ROS because patient is intubated/sedated    OBJECTIVE:  ICU Vital Signs Last 24 Hrs  T(C): 37.2 (14 Dec 2020 10:00), Max: 39.6 (13 Dec 2020 14:00)  T(F): 99 (14 Dec 2020 10:00), Max: 103.3 (13 Dec 2020 14:00)  HR: 70 (14 Dec 2020 11:30) (60 - 89)  BP: --  BP(mean): --  ABP: 126/55 (14 Dec 2020 11:30) (126/55 - 163/73)  ABP(mean): 80 (14 Dec 2020 11:30) (80 - 105)  RR: 30 (14 Dec 2020 11:30) (26 - 36)  SpO2: 95% (14 Dec 2020 11:30) (93% - 100%)    Mode: AC/ CMV (Assist Control/ Continuous Mandatory Ventilation), RR (machine): 30, TV (machine): 370, FiO2: 45, PEEP: 8, ITime: 0.78, MAP: 19, PIP: 43    12-13 @ 07:01 - 12-14 @ 07:00  --------------------------------------------------------  IN: 2748.8 mL / OUT: 234 mL / NET: 2514.8 mL    12-14 @ 07:01 - 12-14 @ 12:57  --------------------------------------------------------  IN: 647.6 mL / OUT: 78 mL / NET: 569.6 mL      CAPILLARY BLOOD GLUCOSE      POCT Blood Glucose.: 176 mg/dL (14 Dec 2020 11:24)      Physical Exam:   Constitutional: ill appearing, no acute distress   HEENT: + PERRLA, EOMI, no drainage or redness  Neck: supple,  No JVD  Respiratory: Ventilator assisted breath rhonchi bilaterally to auscultation, no accessory muscle use noted  Cardiovascular: Regular rate, regular rhythm, normal S1, S2; no murmurs or rub  Gastrointestinal: obese, soft, non-tender, non distended, no hepatosplenomegaly, normal bowel sounds  Extremities: + 2 peripheral edema, no cyanosis, no clubbing   Vascular: Equal and normal pulses: 2+ peripheral pulses throughout  Neurological: sedated/intubated  Skin: DTI to chin area, warm, dry, well perfused, no rashes    HOSPITAL MEDICATIONS:  Standing Meds:  chlorhexidine 0.12% Liquid 15 milliLiter(s) Oral Mucosa every 12 hours  chlorhexidine 4% Liquid 1 Application(s) Topical <User Schedule>  dexMEDEtomidine Infusion 0.5 MICROgram(s)/kG/Hr IV Continuous <Continuous>  fentaNYL   Infusion..... 0.5 MICROgram(s)/kG/Hr IV Continuous <Continuous>  heparin  Infusion. 1100 Unit(s)/Hr IV Continuous <Continuous>  insulin lispro (ADMELOG) corrective regimen sliding scale   SubCutaneous every 6 hours  insulin NPH human recombinant 10 Unit(s) SubCutaneous every 6 hours  meropenem  IVPB 500 milliGRAM(s) IV Intermittent every 24 hours  methadone   Solution 10 milliGRAM(s) Oral three times a day  midodrine 30 milliGRAM(s) Oral every 8 hours  norepinephrine Infusion 0.1 MICROgram(s)/kG/Min IV Continuous <Continuous>  pantoprazole  Injectable 40 milliGRAM(s) IV Push daily  polyethylene glycol 3350 17 Gram(s) Oral daily  senna Syrup 15 milliLiter(s) Oral two times a day      PRN Meds:      LABS:                        7.7    6.12  )-----------( 173      ( 14 Dec 2020 04:13 )             25.4     Hgb Trend: 7.7<--, 7.6<--, 7.0<--, 7.3<--, 7.4<--  12-14    134<L>  |  98  |  66<H>  ----------------------------<  192<H>  4.3   |  18<L>  |  4.35<H>    Ca    8.6      14 Dec 2020 04:13  Phos  5.1     12-14  Mg     1.9     12-14    TPro  6.1  /  Alb  2.4<L>  /  TBili  1.1  /  DBili  x   /  AST  28  /  ALT  <5<L>  /  AlkPhos  76  12-14    Creatinine Trend: 4.35<--, 3.74<--, 3.46<--, 2.88<--, 2.46<--, 2.52<--  PT/INR - ( 14 Dec 2020 04:13 )   PT: 17.0 sec;   INR: 1.51 ratio         PTT - ( 14 Dec 2020 04:13 )  PTT:65.4 sec    Arterial Blood Gas:  12-14 @ 04:13  7.33/36/98/19/97.6/-6.1  ABG lactate: --  Arterial Blood Gas:  12-13 @ 03:30  7.31/49/76/23/94.0/-1.5  ABG lactate: --  Arterial Blood Gas:  12-13 @ 00:30  7.32/47/78/23/94.3/-1.9  ABG lactate: --  Arterial Blood Gas:  12-12 @ 22:10  7.28/55/71/23/91.7/-1.1  ABG lactate: --        MICROBIOLOGY:     Culture - Sputum (collected 12 Dec 2020 16:37)  Source: .Sputum Sputum  Gram Stain (12 Dec 2020 18:18):    Moderate polymorphonuclear leukocytes per low power field    Moderate Gram Negative Rods per oil power field  Preliminary Report (13 Dec 2020 20:25):    Moderate Stenotrophomonas maltophilia    Culture - Blood (collected 11 Dec 2020 19:08)  Source: .Blood Blood-Peripheral  Preliminary Report (12 Dec 2020 20:01):    No growth to date.    Culture - Blood (collected 11 Dec 2020 19:08)  Source: .Blood Blood-Venous  Preliminary Report (12 Dec 2020 20:01):    No growth to date.      RADIOLOGY:  [ ] Reviewed and interpreted by me

## 2020-12-14 NOTE — PROGRESS NOTE ADULT - ASSESSMENT
This is a 60 year old Female w/ pmh of HTN, DVT on Xarelto, peritonitis s/p Oral's procedure and ileostomy (reversed 2011), AARON who presented to [facility] on 11/18  w/ fevers found to have COVID-19, intubated 11/23, course complicated by superimposed PNA and bacteremia with Stenotrophomonas (12/11, completed Levaquin x7 days), MSSA/P. mirablis bacteremia (on Cefazolin, potentially due to urine vs line-associated and MSSA in sputum due to PNA), also with lung abscesses on CT chest on 12/5 , ARTEMIO/ATN requiring CRRT and HD. Now unable to wean from ventilator and transferred from [facility] on 12/10 to offload COVID unit       #Neuro  Alert & oriented x3 @baseline.   - Sedated on Precedex and fentanyl for tachypnea   -will come down on fentanyl and add methadone 10mg TID and titrate dose up if needed  - Pt current mental status most likely encephalopathic from renal failure and uremia  -CTH negative   -ammonia level was 66     #Resp  Acute hypoxemic respiratory failure 2/2 covid-19 PNA. Intubated on 11/23, now unable to wean from ventilator   - c/w AC 30/370/8/30% unable to tolerate PS trial today   -will continue Pressure support trials daily  -consulted for tracheostomy     #CV  Hx of HTN, DVT on Xarelto now in shock state requiring pressors most likely vasoplegic from sedatives   - Continue Midodrine at 30 TID  -c/w norepinephrine gtt and titrate to maintain MAP>65  - Continue heparin gtt, currently therapeutic   - CT negative for PE    #GI  - Continue TF, tolerating well  - Continue bowel regimen  -c/w PPI     #  ARTEMIO 2/2 ATN requiring CRRT, then bumex challenge and now HD  -last HD 12/11 plan for HD today 12/14  -renal following   -will trend electrolytes   - negro in place  -strict I’s and O’s    #ID  Covid 19 pneumonia, MSSA bacteremia/pneumonia, now cavitary consolidation on CT chest.  - s/p remdesivir and dexamethasone   - + Sputum/blood MSSA, + P mirablis in blood, +Stenotrophomonas in sputum   - Was previously on Cefazolin for MSSA bacteremia. Pt continues with fevers, ABX broaden to Fadia and Vanco on 12/11  - s/p Levofloxacin   - caspofungin discontinued, no fungal growth.   - Blood cx from 12/7 NGTD. 12/12 sputum with gram negative rods now speciated to Stenotrophomonas   -c/w meropenem (12/11-  )   -will consult ID     #Heme  Hx DVT on xarelto. Anemic 12/13 with no evidence of bleeding s/p 1u PRBC   -Anticoagulation with Heparin gtt   -continue to trend CBC     #Endo  - ISS q6hrs  - NPH q6hrs

## 2020-12-15 LAB
ALBUMIN SERPL ELPH-MCNC: 2.3 G/DL — LOW (ref 3.3–5)
ALP SERPL-CCNC: 81 U/L — SIGNIFICANT CHANGE UP (ref 40–120)
ALT FLD-CCNC: <5 U/L — LOW (ref 4–33)
ANION GAP SERPL CALC-SCNC: 14 MMOL/L — SIGNIFICANT CHANGE UP (ref 7–14)
APTT BLD: 44.7 SEC — HIGH (ref 27–36.3)
AST SERPL-CCNC: 26 U/L — SIGNIFICANT CHANGE UP (ref 4–32)
BASOPHILS # BLD AUTO: 0.05 K/UL — SIGNIFICANT CHANGE UP (ref 0–0.2)
BASOPHILS NFR BLD AUTO: 0.7 % — SIGNIFICANT CHANGE UP (ref 0–2)
BILIRUB SERPL-MCNC: 1.4 MG/DL — HIGH (ref 0.2–1.2)
BLOOD GAS ARTERIAL COMPREHENSIVE RESULT: SIGNIFICANT CHANGE UP
BUN SERPL-MCNC: 41 MG/DL — HIGH (ref 7–23)
CALCIUM SERPL-MCNC: 8.7 MG/DL — SIGNIFICANT CHANGE UP (ref 8.4–10.5)
CHLORIDE SERPL-SCNC: 97 MMOL/L — LOW (ref 98–107)
CO2 SERPL-SCNC: 22 MMOL/L — SIGNIFICANT CHANGE UP (ref 22–31)
CREAT SERPL-MCNC: 2.65 MG/DL — HIGH (ref 0.5–1.3)
EOSINOPHIL # BLD AUTO: 0.43 K/UL — SIGNIFICANT CHANGE UP (ref 0–0.5)
EOSINOPHIL NFR BLD AUTO: 6.3 % — HIGH (ref 0–6)
GLUCOSE BLDC GLUCOMTR-MCNC: 139 MG/DL — HIGH (ref 70–99)
GLUCOSE BLDC GLUCOMTR-MCNC: 149 MG/DL — HIGH (ref 70–99)
GLUCOSE BLDC GLUCOMTR-MCNC: 159 MG/DL — HIGH (ref 70–99)
GLUCOSE BLDC GLUCOMTR-MCNC: 167 MG/DL — HIGH (ref 70–99)
GLUCOSE SERPL-MCNC: 163 MG/DL — HIGH (ref 70–99)
HCT VFR BLD CALC: 26.6 % — LOW (ref 34.5–45)
HGB BLD-MCNC: 8.1 G/DL — LOW (ref 11.5–15.5)
IANC: 4.28 K/UL — SIGNIFICANT CHANGE UP (ref 1.5–8.5)
IMM GRANULOCYTES NFR BLD AUTO: 6.1 % — HIGH (ref 0–1.5)
INR BLD: 1.34 RATIO — HIGH (ref 0.88–1.17)
LYMPHOCYTES # BLD AUTO: 1.01 K/UL — SIGNIFICANT CHANGE UP (ref 1–3.3)
LYMPHOCYTES # BLD AUTO: 14.8 % — SIGNIFICANT CHANGE UP (ref 13–44)
MAGNESIUM SERPL-MCNC: 1.8 MG/DL — SIGNIFICANT CHANGE UP (ref 1.6–2.6)
MCHC RBC-ENTMCNC: 28.7 PG — SIGNIFICANT CHANGE UP (ref 27–34)
MCHC RBC-ENTMCNC: 30.5 GM/DL — LOW (ref 32–36)
MCV RBC AUTO: 94.3 FL — SIGNIFICANT CHANGE UP (ref 80–100)
MONOCYTES # BLD AUTO: 0.64 K/UL — SIGNIFICANT CHANGE UP (ref 0–0.9)
MONOCYTES NFR BLD AUTO: 9.4 % — SIGNIFICANT CHANGE UP (ref 2–14)
NEUTROPHILS # BLD AUTO: 4.28 K/UL — SIGNIFICANT CHANGE UP (ref 1.8–7.4)
NEUTROPHILS NFR BLD AUTO: 62.7 % — SIGNIFICANT CHANGE UP (ref 43–77)
NRBC # BLD: 2 /100 WBCS — SIGNIFICANT CHANGE UP
NRBC # FLD: 0.11 K/UL — HIGH
PHOSPHATE SERPL-MCNC: 4.1 MG/DL — SIGNIFICANT CHANGE UP (ref 2.5–4.5)
PLATELET # BLD AUTO: 141 K/UL — LOW (ref 150–400)
POTASSIUM SERPL-MCNC: 3.2 MMOL/L — LOW (ref 3.5–5.3)
POTASSIUM SERPL-SCNC: 3.2 MMOL/L — LOW (ref 3.5–5.3)
PROT SERPL-MCNC: 6.3 G/DL — SIGNIFICANT CHANGE UP (ref 6–8.3)
PROTHROM AB SERPL-ACNC: 15.1 SEC — HIGH (ref 9.8–13.1)
RBC # BLD: 2.82 M/UL — LOW (ref 3.8–5.2)
RBC # FLD: 22.6 % — HIGH (ref 10.3–14.5)
SODIUM SERPL-SCNC: 133 MMOL/L — LOW (ref 135–145)
WBC # BLD: 6.83 K/UL — SIGNIFICANT CHANGE UP (ref 3.8–10.5)
WBC # FLD AUTO: 6.83 K/UL — SIGNIFICANT CHANGE UP (ref 3.8–10.5)

## 2020-12-15 PROCEDURE — 99254 IP/OBS CNSLTJ NEW/EST MOD 60: CPT

## 2020-12-15 PROCEDURE — 99291 CRITICAL CARE FIRST HOUR: CPT

## 2020-12-15 PROCEDURE — 99232 SBSQ HOSP IP/OBS MODERATE 35: CPT | Mod: GC

## 2020-12-15 RX ORDER — DIAZEPAM 5 MG
2 TABLET ORAL THREE TIMES A DAY
Refills: 0 | Status: DISCONTINUED | OUTPATIENT
Start: 2020-12-15 | End: 2020-12-16

## 2020-12-15 RX ORDER — MUPIROCIN 20 MG/G
1 OINTMENT TOPICAL
Refills: 0 | Status: COMPLETED | OUTPATIENT
Start: 2020-12-15 | End: 2020-12-20

## 2020-12-15 RX ORDER — POTASSIUM CHLORIDE 20 MEQ
10 PACKET (EA) ORAL
Refills: 0 | Status: COMPLETED | OUTPATIENT
Start: 2020-12-15 | End: 2020-12-15

## 2020-12-15 RX ORDER — MUPIROCIN 20 MG/G
1 OINTMENT TOPICAL
Refills: 0 | Status: DISCONTINUED | OUTPATIENT
Start: 2020-12-15 | End: 2020-12-15

## 2020-12-15 RX ORDER — BUMETANIDE 0.25 MG/ML
4 INJECTION INTRAMUSCULAR; INTRAVENOUS ONCE
Refills: 0 | Status: COMPLETED | OUTPATIENT
Start: 2020-12-15 | End: 2020-12-15

## 2020-12-15 RX ORDER — METHADONE HYDROCHLORIDE 40 MG/1
20 TABLET ORAL THREE TIMES A DAY
Refills: 0 | Status: DISCONTINUED | OUTPATIENT
Start: 2020-12-15 | End: 2020-12-19

## 2020-12-15 RX ADMIN — Medication 2 MILLIGRAM(S): at 21:59

## 2020-12-15 RX ADMIN — HUMAN INSULIN 10 UNIT(S): 100 INJECTION, SUSPENSION SUBCUTANEOUS at 12:25

## 2020-12-15 RX ADMIN — Medication 100 MILLIEQUIVALENT(S): at 06:22

## 2020-12-15 RX ADMIN — Medication 21.6 MICROGRAM(S)/KG/MIN: at 19:50

## 2020-12-15 RX ADMIN — Medication 2: at 05:51

## 2020-12-15 RX ADMIN — PANTOPRAZOLE SODIUM 40 MILLIGRAM(S): 20 TABLET, DELAYED RELEASE ORAL at 12:24

## 2020-12-15 RX ADMIN — POLYETHYLENE GLYCOL 3350 17 GRAM(S): 17 POWDER, FOR SOLUTION ORAL at 12:24

## 2020-12-15 RX ADMIN — HUMAN INSULIN 10 UNIT(S): 100 INJECTION, SUSPENSION SUBCUTANEOUS at 23:09

## 2020-12-15 RX ADMIN — HUMAN INSULIN 10 UNIT(S): 100 INJECTION, SUSPENSION SUBCUTANEOUS at 05:52

## 2020-12-15 RX ADMIN — METHADONE HYDROCHLORIDE 20 MILLIGRAM(S): 40 TABLET ORAL at 22:00

## 2020-12-15 RX ADMIN — Medication 100 MILLIEQUIVALENT(S): at 07:42

## 2020-12-15 RX ADMIN — SENNA PLUS 15 MILLILITER(S): 8.6 TABLET ORAL at 18:24

## 2020-12-15 RX ADMIN — CHLORHEXIDINE GLUCONATE 15 MILLILITER(S): 213 SOLUTION TOPICAL at 18:24

## 2020-12-15 RX ADMIN — DEXMEDETOMIDINE HYDROCHLORIDE IN 0.9% SODIUM CHLORIDE 14.4 MICROGRAM(S)/KG/HR: 4 INJECTION INTRAVENOUS at 06:00

## 2020-12-15 RX ADMIN — Medication 2 MILLIGRAM(S): at 15:08

## 2020-12-15 RX ADMIN — HUMAN INSULIN 10 UNIT(S): 100 INJECTION, SUSPENSION SUBCUTANEOUS at 18:24

## 2020-12-15 RX ADMIN — MIDODRINE HYDROCHLORIDE 30 MILLIGRAM(S): 2.5 TABLET ORAL at 22:00

## 2020-12-15 RX ADMIN — MIDODRINE HYDROCHLORIDE 30 MILLIGRAM(S): 2.5 TABLET ORAL at 15:01

## 2020-12-15 RX ADMIN — CHLORHEXIDINE GLUCONATE 15 MILLILITER(S): 213 SOLUTION TOPICAL at 05:54

## 2020-12-15 RX ADMIN — CHLORHEXIDINE GLUCONATE 1 APPLICATION(S): 213 SOLUTION TOPICAL at 05:55

## 2020-12-15 RX ADMIN — Medication 2: at 18:25

## 2020-12-15 RX ADMIN — METHADONE HYDROCHLORIDE 10 MILLIGRAM(S): 40 TABLET ORAL at 06:43

## 2020-12-15 RX ADMIN — MEROPENEM 100 MILLIGRAM(S): 1 INJECTION INTRAVENOUS at 15:00

## 2020-12-15 RX ADMIN — FENTANYL CITRATE 1.15 MICROGRAM(S)/KG/HR: 50 INJECTION INTRAVENOUS at 19:50

## 2020-12-15 RX ADMIN — METHADONE HYDROCHLORIDE 20 MILLIGRAM(S): 40 TABLET ORAL at 15:00

## 2020-12-15 RX ADMIN — BUMETANIDE 132 MILLIGRAM(S): 0.25 INJECTION INTRAMUSCULAR; INTRAVENOUS at 18:08

## 2020-12-15 RX ADMIN — Medication 21.6 MICROGRAM(S)/KG/MIN: at 06:02

## 2020-12-15 RX ADMIN — SENNA PLUS 10 MILLILITER(S): 8.6 TABLET ORAL at 05:59

## 2020-12-15 RX ADMIN — FENTANYL CITRATE 1.15 MICROGRAM(S)/KG/HR: 50 INJECTION INTRAVENOUS at 06:01

## 2020-12-15 RX ADMIN — MIDODRINE HYDROCHLORIDE 30 MILLIGRAM(S): 2.5 TABLET ORAL at 05:56

## 2020-12-15 NOTE — PROGRESS NOTE ADULT - PROBLEM SELECTOR PLAN 1
Pt. with ARTEMIO in the setting of COVID-19. Pt. likely has ATN. Scr was WNL at 0.53 on 11/23/20 and progressively worsened to 3.0 on 11/27. Patient was initiated on CRRT (at [facility]) on 11/27/20 for oliguric ARTEMIO. CRRT was discontinued on 12/6/20 and transitioned to intermittent HD on 12/8/20. Pt. subsequently started on diuretics and remained non-oliguric until 12/11. Pt. however clinically volume overloaded on exam on 12/11/20 and underwent HD with 2L UF. Pt. underwent last HD session yesterday (12/14/20) with 1.5L UF. Labs reviewed. No plans for HD today as per discussion with CTICU. Recommend trial of diuretics with Bumex 4 mg IV once. Will assess need for HD daily. Monitor labs and urine output. Avoid any potential nephrotoxins. Dose medications as per eGFR.     If any questions, please feel free to contact me  Hermann Presley   Nephrology Fellow  [phone]  (After 5 pm or on weekends please page the on-call fellow) Pt. with ARTEMIO in the setting of COVID-19. Pt. with likely ATN. Scr was WNL at 0.53 on 11/23/20 and progressively worsened to 3.0 on 11/27. Patient was initiated on CRRT (at [facility]) on 11/27/20 for oliguric ARTEMIO. CRRT was discontinued on 12/6/20 and transitioned to intermittent HD on 12/8/20. Pt. subsequently started on diuretics and remained non-oliguric until 12/11. Pt. however clinically volume overloaded on exam on 12/11/20 and underwent HD with 2L UF. Last HD treatment on 12/14/20 with 1.5L UF. Labs reviewed. No plan for HD today as per discussion with CTICU team. Recommend trial of IV diuretics (IV Bumex 2 mg once). Will assess need for HD daily. Monitor labs and urine output. Avoid any potential nephrotoxins. Dose medications as per eGFR.     If any questions, please feel free to contact me  Hermann Presley   Nephrology Fellow  [phone]  (After 5 pm or on weekends please page the on-call fellow)

## 2020-12-15 NOTE — PROGRESS NOTE ADULT - ASSESSMENT
This is a 60 year old Female w/ pmh of HTN, DVT on Xarelto, peritonitis s/p Oral's procedure and ileostomy (reversed 2011), AARON who presented to [facility] on 11/18  w/ fevers found to have COVID-19, intubated 11/23, course complicated by superimposed PNA and bacteremia with Stenotrophomonas (12/11, completed Levaquin x7 days), MSSA/P. mirablis bacteremia (on Cefazolin, potentially due to urine vs line-associated and MSSA in sputum due to PNA), also with lung abscesses on CT chest on 12/5 , ARTEMIO/ATN requiring CRRT and HD. Now unable to wean from ventilator and transferred from [facility] on 12/10 to offload COVID unit       #Neuro  Alert & oriented x3 @baseline.   - Sedated on Precedex and fentanyl for tachypnea   -will come down on fentanyl and increase methadone to 20mg TID and add vailum  - Pt current mental status most likely encephalopathic from renal failure and uremia  -CTH negative   -ammonia level was 66     #Resp  Acute hypoxemic respiratory failure 2/2 covid-19 PNA. Intubated on 11/23, now unable to wean from ventilator   - c/w AC 30/370/8/30% unable to tolerate PS trial today   -will continue Pressure support trials daily  -consulted for tracheostomy     #CV  Hx of HTN, DVT on Xarelto now in shock state requiring pressors most likely vasoplegic from sedatives   - Continue Midodrine at 30 TID  -c/w norepinephrine gtt and titrate to maintain MAP>65  - Continue heparin gtt, currently therapeutic   - CT negative for PE    #GI  - Continue TF, tolerating well  - Continue bowel regimen  -c/w PPI     #  ARTEMIO 2/2 ATN requiring CRRT, then bumex challenge and now HD  -last HD 12/14, no plan for HD today. Will trial bumex 4mg today  -renal following   -will trend electrolytes   - negro in place  -strict I’s and O’s    #ID  Covid 19 pneumonia, MSSA bacteremia/pneumonia, now cavitary consolidation on CT chest.  - s/p remdesivir and dexamethasone   - + Sputum/blood MSSA, + P mirablis in blood, +Stenotrophomonas in sputum   - Was previously on Cefazolin for MSSA bacteremia. Pt continues with fevers, ABX broaden to Fadia and Vanco on 12/11  - s/p Levofloxacin   - caspofungin discontinued, no fungal growth.   - Blood cx from 12/7 NGTD. 12/12 sputum with gram negative rods now speciated to Stenotrophomonas   -c/w meropenem (12/11-  )   - ID consulted, will follow up    #Heme  Hx DVT on xarelto. Anemic 12/13 with no evidence of bleeding s/p 1u PRBC   -Anticoagulation with Heparin gtt   -continue to trend CBC     #Endo  - ISS q6hrs  - NPH q6hrs

## 2020-12-15 NOTE — PROGRESS NOTE ADULT - ATTENDING COMMENTS
59 y/o F with PMH as above presented to the hospital with shortness of breath on 11/18, developed progressive hypoxic respiratory failure requiring intubation on 11/23.  Patient transferred to [facility] on 12/10.  Hospital course c/b lung abscess, MSSA bacteremia, now with ARTEMIO requiring HD, s/p HD session yesterday with 1.4L removal. Patient with Stenotrophomonas in the sputum, on antibiotics, follow up with ID.  Will wean sedation as tolerate, attempt PSV trial. May require trach if unable to wean. Prognosis guarded.

## 2020-12-15 NOTE — PROGRESS NOTE ADULT - SUBJECTIVE AND OBJECTIVE BOX
[facility] DIVISION OF KIDNEY DISEASES AND HYPERTENSION -- FOLLOW UP NOTE  --------------------------------------------------------------------------------  Chief Complaint: ARTEMIO in the setting of COVID-19, on RRT/dialysis.    24 hour events/subjective: Pt. was seen and examined at bedside today in the CTICU. Pt. transferred from [facility] on 12/10/20. Pt. currently intubated and on IV vasopressor to maintain MAP. Pt. underwent HD yesterday (12/14/20) with 1.5L UF. Pt. remains oliguric with 333 ml urine output in last 24 hours.    PAST HISTORY  --------------------------------------------------------------------------------  No significant changes to PMH, PSH, FHx, SHx, unless otherwise noted    ALLERGIES & MEDICATIONS  --------------------------------------------------------------------------------  Allergies    Kiwi (Unknown)  latex (Anaphylaxis)  latex (Unknown)  penicillin (Other)  penicillin (Unknown)  perfume  hives (Other)  potassium acetate (Other)  soap additives hives (Other)    Intolerances    Standing Inpatient Medications  chlorhexidine 0.12% Liquid 15 milliLiter(s) Oral Mucosa every 12 hours  chlorhexidine 4% Liquid 1 Application(s) Topical <User Schedule>  dexMEDEtomidine Infusion 0.5 MICROgram(s)/kG/Hr IV Continuous <Continuous>  fentaNYL   Infusion..... 0.5 MICROgram(s)/kG/Hr IV Continuous <Continuous>  heparin  Infusion. 1100 Unit(s)/Hr IV Continuous <Continuous>  insulin lispro (ADMELOG) corrective regimen sliding scale   SubCutaneous every 6 hours  insulin NPH human recombinant 10 Unit(s) SubCutaneous every 6 hours  meropenem  IVPB 500 milliGRAM(s) IV Intermittent every 24 hours  methadone   Solution 10 milliGRAM(s) Oral three times a day  midodrine 30 milliGRAM(s) Oral every 8 hours  norepinephrine Infusion 0.1 MICROgram(s)/kG/Min IV Continuous <Continuous>  pantoprazole  Injectable 40 milliGRAM(s) IV Push daily  polyethylene glycol 3350 17 Gram(s) Oral daily  senna Syrup 15 milliLiter(s) Oral two times a day    REVIEW OF SYSTEMS  --------------------------------------------------------------------------------  Unable to obtain due to medical condition    VITALS/PHYSICAL EXAM  --------------------------------------------------------------------------------  T(C): 36.7 (12-15-20 @ 08:00), Max: 37.2 (12-14-20 @ 10:00)  HR: 68 (12-15-20 @ 08:00) (53 - 71)  BP: --  RR: 30 (12-15-20 @ 08:00) (0 - 31)  SpO2: 95% (12-15-20 @ 08:00) (92% - 100%)  Wt(kg): --    12-14-20 @ 07:01  -  12-15-20 @ 07:00  --------------------------------------------------------  IN: 3333.4 mL / OUT: 2536 mL / NET: 797.4 mL    12-15-20 @ 07:01  -  12-15-20 @ 08:54  --------------------------------------------------------  IN: 199.6 mL / OUT: 25 mL / NET: 174.6 mL    Physical Exam:  	 Gen: Intubated  	HEENT: ETT +  	Pulm: Fair air entry B/L  	CV: S1S2+  	Abd: Soft, +BS   	Ext: trace LE edema B/L  	Neuro: sedated  	Vascular access: Right IJ non-tunneled HD catheter site : No bleeding seen    LABS/STUDIES  --------------------------------------------------------------------------------              8.1    6.83  >-----------<  141      [12-15-20 @ 03:49]              26.6     133  |  97  |  41  ----------------------------<  163      [12-15-20 @ 03:49]  3.2   |  22  |  2.65        Ca     8.7     [12-15-20 @ 03:49]      Mg     1.8     [12-15-20 @ 03:49]      Phos  4.1     [12-15-20 @ 03:49]    Creatinine Trend:  SCr 2.65 [12-15 @ 03:49]  SCr 4.35 [12-14 @ 04:13]  SCr 3.74 [12-13 @ 03:30]  SCr 3.46 [12-12 @ 22:10]  SCr 2.88 [12-12 @ 04:58]   [facility] DIVISION OF KIDNEY DISEASES AND HYPERTENSION -- FOLLOW UP NOTE  --------------------------------------------------------------------------------  Chief Complaint: ARTEMIO in the setting of COVID-19, on RRT/dialysis.    24 hour events/subjective: Pt. was seen and examined at bedside today in the CTICU. Pt. transferred from [facility] on 12/10/20. Pt. currently intubated and on IV vasopressor to maintain MAP. Pt. underwent HD yesterday (12/14/20) with 1.5L UF. Pt. remains oliguric with 333 ml urine output in last 24 hours.    PAST HISTORY  --------------------------------------------------------------------------------  No significant changes to PMH, PSH, FHx, SHx, unless otherwise noted    ALLERGIES & MEDICATIONS  --------------------------------------------------------------------------------  Allergies    Kiwi (Unknown)  latex (Anaphylaxis)  latex (Unknown)  penicillin (Other)  penicillin (Unknown)  perfume  hives (Other)  potassium acetate (Other)  soap additives hives (Other)    Intolerances    Standing Inpatient Medications  chlorhexidine 0.12% Liquid 15 milliLiter(s) Oral Mucosa every 12 hours  chlorhexidine 4% Liquid 1 Application(s) Topical <User Schedule>  dexMEDEtomidine Infusion 0.5 MICROgram(s)/kG/Hr IV Continuous <Continuous>  fentaNYL   Infusion..... 0.5 MICROgram(s)/kG/Hr IV Continuous <Continuous>  heparin  Infusion. 1100 Unit(s)/Hr IV Continuous <Continuous>  insulin lispro (ADMELOG) corrective regimen sliding scale   SubCutaneous every 6 hours  insulin NPH human recombinant 10 Unit(s) SubCutaneous every 6 hours  meropenem  IVPB 500 milliGRAM(s) IV Intermittent every 24 hours  methadone   Solution 10 milliGRAM(s) Oral three times a day  midodrine 30 milliGRAM(s) Oral every 8 hours  norepinephrine Infusion 0.1 MICROgram(s)/kG/Min IV Continuous <Continuous>  pantoprazole  Injectable 40 milliGRAM(s) IV Push daily  polyethylene glycol 3350 17 Gram(s) Oral daily  senna Syrup 15 milliLiter(s) Oral two times a day    REVIEW OF SYSTEMS  --------------------------------------------------------------------------------  Unable to obtain due to medical condition    VITALS/PHYSICAL EXAM  --------------------------------------------------------------------------------  T(C): 36.7 (12-15-20 @ 08:00), Max: 37.2 (12-14-20 @ 10:00)  HR: 68 (12-15-20 @ 08:00) (53 - 71)  BP: --  RR: 30 (12-15-20 @ 08:00) (0 - 31)  SpO2: 95% (12-15-20 @ 08:00) (92% - 100%)  Wt(kg): --    12-14-20 @ 07:01  -  12-15-20 @ 07:00  --------------------------------------------------------  IN: 3333.4 mL / OUT: 2536 mL / NET: 797.4 mL    12-15-20 @ 07:01  -  12-15-20 @ 08:54  --------------------------------------------------------  IN: 199.6 mL / OUT: 25 mL / NET: 174.6 mL    Physical Exam:  	Gen: Intubated  	HEENT: ETT +  	Pulm: Fair air entry B/L  	CV: S1S2+  	Abd: Soft, +BS   	Ext: trace LE edema B/L  	Neuro: sedated  	Vascular access: Right IJ non-tunneled HD catheter site : No bleeding seen    LABS/STUDIES  --------------------------------------------------------------------------------              8.1    6.83  >-----------<  141      [12-15-20 @ 03:49]              26.6     133  |  97  |  41  ----------------------------<  163      [12-15-20 @ 03:49]  3.2   |  22  |  2.65        Ca     8.7     [12-15-20 @ 03:49]      Mg     1.8     [12-15-20 @ 03:49]      Phos  4.1     [12-15-20 @ 03:49]    Creatinine Trend:  SCr 2.65 [12-15 @ 03:49]  SCr 4.35 [12-14 @ 04:13]  SCr 3.74 [12-13 @ 03:30]  SCr 3.46 [12-12 @ 22:10]  SCr 2.88 [12-12 @ 04:58]

## 2020-12-15 NOTE — PROGRESS NOTE ADULT - SUBJECTIVE AND OBJECTIVE BOX
Significant recent/past 24 hr events: S/p HD Overnight. 1.4L removed    Subjective:    Review of Systems       Unable to obtain due to: intubated & sedated          PAST MEDICAL & SURGICAL HISTORY:  Pneumomediastinum    Umbilical hernia  Reduciable    Osteoarthritis of both knees, unspecified osteoarthritis type    AARON (Obstructive Sleep Apnea)  category II pt uses c/pap pt to bering to hospital/  OR booking notified    Pneumonia      GERD (Gastroesophageal Reflux Disease)    Asthma  diagnosed   on adbvair denies attacks    DVT (Deep Venous Thrombosis)  left leg 6 m s/p knee replacement in     HTN (Hypertension)    H/O ileostomy  Ileostomy Revesal     S/P LEEP      S/P Exploratory Laparotomy    peritonitis  s/p right knee replacement/ colon resection and ileostomy    S/P Colonoscopy      S/P Endoscopy   gerd    S/P  Section      History of Tubal Ligation  s/p c/section     S/P Knee Surgery  2010      FAMILY HISTORY:  Family history of gastric cancer    Family history of breast cancer (Grandparent)        Vitals   ICU Vital Signs Last 24 Hrs  T(C): 36.7 (15 Dec 2020 08:00), Max: 37.1 (14 Dec 2020 16:00)  T(F): 98.1 (15 Dec 2020 08:00), Max: 98.8 (14 Dec 2020 16:00)  HR: 71 (15 Dec 2020 13:00) (53 - 72)  ABP: 128/47 (15 Dec 2020 13:00) (111/49 - 164/62)  ABP(mean): 74 (15 Dec 2020 13:00) (68 - 96)  RR: 32 (15 Dec 2020 13:00) (0 - 32)  SpO2: 91% (15 Dec 2020 13:00) (90% - 100%)      Physical Exam:   Constitutional: ill appearing, no acute distress   HEENT: + PERRLA, EOMI, no drainage or redness  Neck: supple,  No JVD  Respiratory: Ventilator assisted breath rhonchi bilaterally to auscultation, no accessory muscle use noted  Cardiovascular: Regular rate, regular rhythm, normal S1, S2; no murmurs or rub  Gastrointestinal: obese, soft, non-tender, non distended, no hepatosplenomegaly, normal bowel sounds  Extremities: + 2 peripheral edema, no cyanosis, no clubbing   Vascular: Equal and normal pulses: 2+ peripheral pulses throughout  Neurological: sedated/intubated  Skin: DTI to chin area, warm, dry, well perfused, no rashes    VENT SETTINGS   Mode: AC/ CMV (Assist Control/ Continuous Mandatory Ventilation)  RR (machine): 30  TV (machine): 380  FiO2: 40  PEEP: 8  ITime: 0.87  MAP: 20  PIP: 42    ABG - ( 15 Dec 2020 03:49 )  pH, Arterial: 7.42  pH, Blood: x     /  pCO2: 36    /  pO2: 178   / HCO3: 24    / Base Excess: -0.5  /  SaO2: 99.5        I&O's Detail    14 Dec 2020 07:01  -  15 Dec 2020 07:00  --------------------------------------------------------  IN:    Dexmedetomidine: 806 mL    Enteral Tube Flush: 330 mL    FentaNYL: 215.9 mL    Heparin Infusion: 264 mL    Nepro: 720 mL    Norepinephrine: 197.5 mL    Other (mL): 800 mL  Total IN: 3333.4 mL    OUT:    Indwelling Catheter - Urethral (mL): 333 mL    Other (mL): 2203 mL  Total OUT: 2536 mL    Total NET: 797.4 mL      15 Dec 2020 07:01  -  15 Dec 2020 13:36  --------------------------------------------------------  IN:    Dexmedetomidine: 301.7 mL    FentaNYL: 48.3 mL    Heparin Infusion: 77 mL    IV PiggyBack: 100 mL    Nepro: 180 mL    Norepinephrine: 60.2 mL  Total IN: 767.2 mL    OUT:    Indwelling Catheter - Urethral (mL): 65 mL  Total OUT: 65 mL    Total NET: 702.2 mL          LABS                        8.1    6.83  )-----------( 141      ( 15 Dec 2020 03:49 )             26.6     12-15    133<L>  |  97<L>  |  41<H>  ----------------------------<  163<H>  3.2<L>   |  22  |  2.65<H>    Ca    8.7      15 Dec 2020 03:49  Phos  4.1     12-15  Mg     1.8     12-15    TPro  6.3  /  Alb  2.3<L>  /  TBili  1.4<H>  /  DBili  x   /  AST  26  /  ALT  <5<L>  /  AlkPhos  81  12-15    LIVER FUNCTIONS - ( 15 Dec 2020 03:49 )  Alb: 2.3 g/dL / Pro: 6.3 g/dL / ALK PHOS: 81 U/L / ALT: <5 U/L / AST: 26 U/L / GGT: x           PT/INR - ( 15 Dec 2020 03:49 )   PT: 15.1 sec;   INR: 1.34 ratio         PTT - ( 15 Dec 2020 03:49 )  PTT:44.7 sec          POCT Blood Glucose.: 149 mg/dL <H> (12-15-20 @ 12:23)  POCT Blood Glucose.: 167 mg/dL <H> (12-15-20 @ 05:48)  POCT Blood Glucose.: 134 mg/dL <H> (20 @ 23:32)  POCT Blood Glucose.: 119 mg/dL <H> (20 @ 17:20)        MEDICATIONS  (STANDING):  buMETAnide IVPB 4 milliGRAM(s) IV Intermittent once  chlorhexidine 0.12% Liquid 15 milliLiter(s) Oral Mucosa every 12 hours  chlorhexidine 4% Liquid 1 Application(s) Topical <User Schedule>  dexMEDEtomidine Infusion 0.5 MICROgram(s)/kG/Hr (14.4 mL/Hr) IV Continuous <Continuous>  fentaNYL   Infusion..... 0.5 MICROgram(s)/kG/Hr (1.15 mL/Hr) IV Continuous <Continuous>  heparin  Infusion. 1100 Unit(s)/Hr (11 mL/Hr) IV Continuous <Continuous>  insulin lispro (ADMELOG) corrective regimen sliding scale   SubCutaneous every 6 hours  insulin NPH human recombinant 10 Unit(s) SubCutaneous every 6 hours  meropenem  IVPB 500 milliGRAM(s) IV Intermittent every 24 hours  methadone   Solution 10 milliGRAM(s) Oral three times a day  midodrine 30 milliGRAM(s) Oral every 8 hours  mupirocin 2% Nasal 1 Application(s) Nasal two times a day  norepinephrine Infusion 0.1 MICROgram(s)/kG/Min (21.6 mL/Hr) IV Continuous <Continuous>  pantoprazole  Injectable 40 milliGRAM(s) IV Push daily  polyethylene glycol 3350 17 Gram(s) Oral daily  senna Syrup 15 milliLiter(s) Oral two times a day    MEDICATIONS  (PRN):      Allergies:  Kiwi (Unknown)  latex (Anaphylaxis)  latex (Unknown)  penicillin (Other)  penicillin (Unknown)  perfume  hives (Other)  potassium acetate (Other)  soap additives hives (Other)

## 2020-12-15 NOTE — CONSULT NOTE ADULT - SUBJECTIVE AND OBJECTIVE BOX
HPI:  Patient is a 59yo F w/ AARON, peritonitis s/p Orla's procedure and ileostomy (reversed ), HTN, prior DVT/PE, ?Asthma admitted on 2020 to [facility] after presenting for  productive cough  w/ SOB x9 days and diarrhea x7 days and fever x1 day. Patient was found to be COVID + on 2020 and completed remdesivir -. Patient was intubated for airway protection on 2020. Patient required proning (last 2020). Hospital course c/b by ATN, requiring CVVHD now on intermittent HD,  c/b Stenotrophomonas in sputum (, completed Levaquin x7 days), MSSA/P. mirablis bacteremia (on Cefazolin, potentially due to urine vs line-associated and MSSA in sputum due to PNA), also with lung abscesses on CT chest on .  Transferred over to [facility] on 12/10 to offload [facility] COVID ICU.  Sputum culture obtained.; growing Stenotrophomonas.  antibiotics broadened to vanco and meropenem.     PAST MEDICAL & SURGICAL HISTORY:  Pneumomediastinum    Umbilical hernia  Reduciable    Osteoarthritis of both knees, unspecified osteoarthritis type    AARON (Obstructive Sleep Apnea)  category II pt uses c/pap pt to bering to hospital/  OR booking notified    Pneumonia      GERD (Gastroesophageal Reflux Disease)    Asthma  diagnosed   on adbvair denies attacks    DVT (Deep Venous Thrombosis)  left leg 6 m s/p knee replacement in     HTN (Hypertension)    H/O ileostomy  Ileostomy Revesal     S/P LEEP      S/P Exploratory Laparotomy    peritonitis  s/p right knee replacement/ colon resection and ileostomy    S/P Colonoscopy      S/P Endoscopy   gerd    S/P  Section      History of Tubal Ligation  s/p c/section     S/P Knee Surgery  2010        Allergies    Kiwi (Unknown)  latex (Anaphylaxis)  latex (Unknown)  penicillin (Other)  penicillin (Unknown)  perfume  hives (Other)  potassium acetate (Other)  soap additives hives (Other)    Intolerances        ANTIMICROBIALS:  meropenem  IVPB 500 every 24 hours      OTHER MEDS:  chlorhexidine 0.12% Liquid 15 milliLiter(s) Oral Mucosa every 12 hours  chlorhexidine 4% Liquid 1 Application(s) Topical <User Schedule>  dexMEDEtomidine Infusion 0.5 MICROgram(s)/kG/Hr IV Continuous <Continuous>  diazepam    Tablet 2 milliGRAM(s) Oral three times a day  fentaNYL   Infusion..... 0.5 MICROgram(s)/kG/Hr IV Continuous <Continuous>  heparin  Infusion. 1100 Unit(s)/Hr IV Continuous <Continuous>  insulin lispro (ADMELOG) corrective regimen sliding scale   SubCutaneous every 6 hours  insulin NPH human recombinant 10 Unit(s) SubCutaneous every 6 hours  methadone   Solution 20 milliGRAM(s) Oral three times a day  midodrine 30 milliGRAM(s) Oral every 8 hours  mupirocin 2% Ointment 1 Application(s) Both Nostrils two times a day  norepinephrine Infusion 0.1 MICROgram(s)/kG/Min IV Continuous <Continuous>  pantoprazole  Injectable 40 milliGRAM(s) IV Push daily  polyethylene glycol 3350 17 Gram(s) Oral daily  senna Syrup 15 milliLiter(s) Oral two times a day      SOCIAL HISTORY:    unable  FAMILY HISTORY:  Family history of gastric cancer    Family history of breast cancer (Grandparent)        REVIEW OF SYSTEMS  [x  ] ROS unobtainable because:  intubated, sedated  [  ] All other systems negative except as noted below:	    Constitutional:  [ ] fever [ ] chills  [ ] weight loss  [ ] weakness  Skin:  [ ] rash [ ] phlebitis	  Eyes: [ ] icterus [ ] pain  [ ] discharge	  ENMT: [ ] sore throat  [ ] thrush [ ] ulcers [ ] exudates  Respiratory: [ ] dyspnea [ ] hemoptysis [ ] cough [ ] sputum	  Cardiovascular:  [ ] chest pain [ ] palpitations [ ] edema	  Gastrointestinal:  [ ] nausea [ ] vomiting [ ] diarrhea [ ] constipation [ ] pain	  Genitourinary:  [ ] dysuria [ ] frequency [ ] hematuria [ ] discharge [ ] flank pain  [ ] incontinence  Musculoskeletal:  [ ] myalgias [ ] arthralgias [ ] arthritis  [ ] back pain  Neurological:  [ ] headache [ ] seizures  [ ] confusion/altered mental status  Psychiatric:  [ ] anxiety [ ] depression	  Hematology/Lymphatics:  [ ] lymphadenopathy  Endocrine:  [ ] adrenal [ ] thyroid  Allergic/Immunologic:	 [ ] transplant [ ] seasonal    PHYSICAL EXAM:  General:  intubated, sedated  HEAD/EYES: NGT  ENT:  orally intubated, black eschar on lips, chin  Cardiovascular: distant  Respiratory:  vent sounds  GI:  umbilical hernia, multiple well healed scars  :  negro   Musculoskeletal: no synovitis  Neurologic: unable   Skin:   no rash  Psychiatric: unable  Lines:  rt IJ Shiley, right axillary A line, IV peripheral bilat.          Drug Dosing Weight  Height (cm): 160 (10 Dec 2020 14:29)  Weight (kg): 115 (10 Dec 2020 14:29)  BMI (kg/m2): 44.9 (10 Dec 2020 14:29)  BSA (m2): 2.14 (10 Dec 2020 14:29)    Vital Signs Last 24 Hrs  T(F): 98.6 (12-15-20 @ 16:00), Max: 104.5 (20 @ 04:00)    Vital Signs Last 24 Hrs  HR: 75 (12-15-20 @ 16:21) (53 - 75)  BP: --  RR: 17 (12-15-20 @ 16:00)  SpO2: 97% (12-15-20 @ 16:21) (90% - 100%)  Wt(kg): --                          8.1    6.83  )-----------( 141      ( 15 Dec 2020 03:49 )             26.6       12-15    133<L>  |  97<L>  |  41<H>  ----------------------------<  163<H>  3.2<L>   |  22  |  2.65<H>    Ca    8.7      15 Dec 2020 03:49  Phos  4.1     12-15  Mg     1.8     12-15    TPro  6.3  /  Alb  2.3<L>  /  TBili  1.4<H>  /  DBili  x   /  AST  26  /  ALT  <5<L>  /  AlkPhos  81  12-15          MICROBIOLOGY:  .Sputum Sputum  20   Moderate Stenotrophomonas maltophilia  Normal Respiratory Jocelynn absent  --  Stenotrophomonas maltophilia      .Blood Blood-Venous  20   No growth to date.  --  --      .Urine Catheterized  20   No growth  --  --      .Urine Catheterized  20   No growth  --  --      .Blood Blood-Peripheral  20   No Growth Final  --  --      .Sputum Sputum  20   Moderate Stenotrophomonas maltophilia  Normal Respiratory Jocelynn present  --  Stenotrophomonas maltophilia      .Blood Blood-Peripheral  20   No Growth Final  --  --      .Urine Clean Catch (Midstream)  20   <10,000 CFU/mL Normal Urogenital Jocelynn  --  --      .Blood Blood-Peripheral  20   Growth in aerobic and anaerobic bottles: Staphylococcus aureus See  previous culture [id number]  --    Growth in aerobic bottle: Gram Positive Cocci in Clusters  Growth in anaerobic bottle: Gram Positive Cocci in Clusters      .Blood Blood-Peripheral  20   Growth in aerobic and anaerobic bottles: Staphylococcus aureus  Growth in anaerobic bottle: Staphylococcus hominis  Coag Negative Staphylococcus  Single set isolate, possible contaminant. Contact  Microbiology if susceptibility testing clinically  indicated.  ***Blood Panel PCR results on this specimen are available  approximately 3 hours after the Gram stain result.***  Gram stain, PCR, and/or culture results may not always  correspond due to difference in methodologies.  ************************************************************   --  Blood Culture PCR  Staphylococcus aureus      .Sputum  20   Numerous Staphylococcus aureus  Normal Respiratory Jocelynn absent  --  Staphylococcus aureus      .Sputum Sputum  20   Moderate Staphylococcus aureus  Normal Respiratory Jocelynn absent  --  Staphylococcus aureus      .Blood Blood-Peripheral  20   No Growth Final  --  --        RADIOLOGY:    rd< from: Xray Chest 1 View- PORTABLE-Urgent (Xray Chest 1 View- PORTABLE-Urgent .) (20 @ 20:21) >  EXAM:  XR CHEST PORTABLE URGENT 1V        PROCEDURE DATE:  Dec 13 2020         INTERPRETATION:  TIME OF EXAM: 2020 at 8:20 PM    CLINICAL INFORMATION: Orogastric tube placement    TECHNIQUE:   Portable chest    INTERPRETATION:  The tipof the enteric tube is seen in the body of the stomach.  There are opacities in the right lung base and periphery of the right midlung field again seen unchanged from the last study. Visible left lung appears clear.    No obvious effusions or pneumothorax.    Tip of the endotracheal tube is seen in place.        COMPARISON:  December 10      IMPRESSION:  1. Status post enteric tube placement.  2. Opacities in the right lung base and periphery consistent with multifocal covid 19 infection.              DAVIS VARGAS MD; Attending Radiologist  This document has been electronically signed. Dec 14 2020 12:22PM    < end of copied text >  < from: CT Head No Cont (20 @ 10:29) >  IMPRESSION:  No acute intracranial findings.    < end of copied text >  < from: CT Chest w/ IV Cont (20 @ 22:02) >    IMPRESSION:  New large consolidation with areas of cavitation in the right lung. Multifocal pneumonia with cavitation is a consideration.    No acute intra-abdominal pathology.    < end of copied text >

## 2020-12-16 LAB
ALBUMIN SERPL ELPH-MCNC: 2.1 G/DL — LOW (ref 3.3–5)
ALP SERPL-CCNC: 99 U/L — SIGNIFICANT CHANGE UP (ref 40–120)
ALT FLD-CCNC: <5 U/L — LOW (ref 4–33)
ANION GAP SERPL CALC-SCNC: 13 MMOL/L — SIGNIFICANT CHANGE UP (ref 7–14)
APTT BLD: 40.5 SEC — HIGH (ref 27–36.3)
APTT BLD: 41.4 SEC — HIGH (ref 27–36.3)
APTT BLD: 56.9 SEC — HIGH (ref 27–36.3)
AST SERPL-CCNC: 25 U/L — SIGNIFICANT CHANGE UP (ref 4–32)
BILIRUB SERPL-MCNC: 1.4 MG/DL — HIGH (ref 0.2–1.2)
BLOOD GAS ARTERIAL COMPREHENSIVE RESULT: SIGNIFICANT CHANGE UP
BUN SERPL-MCNC: 49 MG/DL — HIGH (ref 7–23)
CALCIUM SERPL-MCNC: 8.6 MG/DL — SIGNIFICANT CHANGE UP (ref 8.4–10.5)
CHLORIDE SERPL-SCNC: 99 MMOL/L — SIGNIFICANT CHANGE UP (ref 98–107)
CO2 SERPL-SCNC: 22 MMOL/L — SIGNIFICANT CHANGE UP (ref 22–31)
CREAT SERPL-MCNC: 3.1 MG/DL — HIGH (ref 0.5–1.3)
CULTURE RESULTS: SIGNIFICANT CHANGE UP
CULTURE RESULTS: SIGNIFICANT CHANGE UP
GAS PNL BLDA: SIGNIFICANT CHANGE UP
GLUCOSE BLDC GLUCOMTR-MCNC: 133 MG/DL — HIGH (ref 70–99)
GLUCOSE BLDC GLUCOMTR-MCNC: 141 MG/DL — HIGH (ref 70–99)
GLUCOSE BLDC GLUCOMTR-MCNC: 215 MG/DL — HIGH (ref 70–99)
GLUCOSE SERPL-MCNC: 138 MG/DL — HIGH (ref 70–99)
HCT VFR BLD CALC: 25.6 % — LOW (ref 34.5–45)
HGB BLD-MCNC: 7.7 G/DL — LOW (ref 11.5–15.5)
INR BLD: 1.39 RATIO — HIGH (ref 0.88–1.17)
MAGNESIUM SERPL-MCNC: 1.8 MG/DL — SIGNIFICANT CHANGE UP (ref 1.6–2.6)
MCHC RBC-ENTMCNC: 28.5 PG — SIGNIFICANT CHANGE UP (ref 27–34)
MCHC RBC-ENTMCNC: 30.1 GM/DL — LOW (ref 32–36)
MCV RBC AUTO: 94.8 FL — SIGNIFICANT CHANGE UP (ref 80–100)
NRBC # BLD: 1 /100 WBCS — SIGNIFICANT CHANGE UP
NRBC # FLD: 0.1 K/UL — HIGH
PHOSPHATE SERPL-MCNC: 4.4 MG/DL — SIGNIFICANT CHANGE UP (ref 2.5–4.5)
PLATELET # BLD AUTO: 155 K/UL — SIGNIFICANT CHANGE UP (ref 150–400)
POTASSIUM SERPL-MCNC: 3.8 MMOL/L — SIGNIFICANT CHANGE UP (ref 3.5–5.3)
POTASSIUM SERPL-SCNC: 3.8 MMOL/L — SIGNIFICANT CHANGE UP (ref 3.5–5.3)
PROT SERPL-MCNC: 6 G/DL — SIGNIFICANT CHANGE UP (ref 6–8.3)
PROTHROM AB SERPL-ACNC: 15.7 SEC — HIGH (ref 9.8–13.1)
RBC # BLD: 2.7 M/UL — LOW (ref 3.8–5.2)
RBC # FLD: 22.3 % — HIGH (ref 10.3–14.5)
SODIUM SERPL-SCNC: 134 MMOL/L — LOW (ref 135–145)
SPECIMEN SOURCE: SIGNIFICANT CHANGE UP
SPECIMEN SOURCE: SIGNIFICANT CHANGE UP
WBC # BLD: 7.63 K/UL — SIGNIFICANT CHANGE UP (ref 3.8–10.5)
WBC # FLD AUTO: 7.63 K/UL — SIGNIFICANT CHANGE UP (ref 3.8–10.5)

## 2020-12-16 PROCEDURE — 99291 CRITICAL CARE FIRST HOUR: CPT

## 2020-12-16 PROCEDURE — 99254 IP/OBS CNSLTJ NEW/EST MOD 60: CPT | Mod: 57

## 2020-12-16 PROCEDURE — 99232 SBSQ HOSP IP/OBS MODERATE 35: CPT

## 2020-12-16 PROCEDURE — 99233 SBSQ HOSP IP/OBS HIGH 50: CPT

## 2020-12-16 PROCEDURE — 99255 IP/OBS CONSLTJ NEW/EST HI 80: CPT

## 2020-12-16 PROCEDURE — 71045 X-RAY EXAM CHEST 1 VIEW: CPT | Mod: 26

## 2020-12-16 RX ORDER — IMIPENEM AND CILASTATIN 250; 250 MG/100ML; MG/100ML
250 INJECTION, POWDER, FOR SOLUTION INTRAVENOUS EVERY 12 HOURS
Refills: 0 | Status: DISCONTINUED | OUTPATIENT
Start: 2020-12-16 | End: 2020-12-22

## 2020-12-16 RX ORDER — DIAZEPAM 5 MG
10 TABLET ORAL THREE TIMES A DAY
Refills: 0 | Status: DISCONTINUED | OUTPATIENT
Start: 2020-12-16 | End: 2020-12-19

## 2020-12-16 RX ORDER — BUMETANIDE 0.25 MG/ML
2 INJECTION INTRAMUSCULAR; INTRAVENOUS
Qty: 20 | Refills: 0 | Status: DISCONTINUED | OUTPATIENT
Start: 2020-12-16 | End: 2020-12-18

## 2020-12-16 RX ADMIN — Medication 2 MILLIGRAM(S): at 05:32

## 2020-12-16 RX ADMIN — SENNA PLUS 15 MILLILITER(S): 8.6 TABLET ORAL at 17:32

## 2020-12-16 RX ADMIN — HUMAN INSULIN 10 UNIT(S): 100 INJECTION, SUSPENSION SUBCUTANEOUS at 17:50

## 2020-12-16 RX ADMIN — HUMAN INSULIN 10 UNIT(S): 100 INJECTION, SUSPENSION SUBCUTANEOUS at 11:52

## 2020-12-16 RX ADMIN — DEXMEDETOMIDINE HYDROCHLORIDE IN 0.9% SODIUM CHLORIDE 14.4 MICROGRAM(S)/KG/HR: 4 INJECTION INTRAVENOUS at 11:00

## 2020-12-16 RX ADMIN — DEXMEDETOMIDINE HYDROCHLORIDE IN 0.9% SODIUM CHLORIDE 14.4 MICROGRAM(S)/KG/HR: 4 INJECTION INTRAVENOUS at 11:56

## 2020-12-16 RX ADMIN — MUPIROCIN 1 APPLICATION(S): 20 OINTMENT TOPICAL at 04:43

## 2020-12-16 RX ADMIN — MIDODRINE HYDROCHLORIDE 30 MILLIGRAM(S): 2.5 TABLET ORAL at 05:32

## 2020-12-16 RX ADMIN — DEXMEDETOMIDINE HYDROCHLORIDE IN 0.9% SODIUM CHLORIDE 14.4 MICROGRAM(S)/KG/HR: 4 INJECTION INTRAVENOUS at 07:31

## 2020-12-16 RX ADMIN — FENTANYL CITRATE 1.15 MICROGRAM(S)/KG/HR: 50 INJECTION INTRAVENOUS at 00:07

## 2020-12-16 RX ADMIN — POLYETHYLENE GLYCOL 3350 17 GRAM(S): 17 POWDER, FOR SOLUTION ORAL at 11:50

## 2020-12-16 RX ADMIN — DEXMEDETOMIDINE HYDROCHLORIDE IN 0.9% SODIUM CHLORIDE 14.4 MICROGRAM(S)/KG/HR: 4 INJECTION INTRAVENOUS at 18:19

## 2020-12-16 RX ADMIN — MUPIROCIN 1 APPLICATION(S): 20 OINTMENT TOPICAL at 17:52

## 2020-12-16 RX ADMIN — CHLORHEXIDINE GLUCONATE 15 MILLILITER(S): 213 SOLUTION TOPICAL at 02:49

## 2020-12-16 RX ADMIN — PANTOPRAZOLE SODIUM 40 MILLIGRAM(S): 20 TABLET, DELAYED RELEASE ORAL at 11:57

## 2020-12-16 RX ADMIN — SENNA PLUS 15 MILLILITER(S): 8.6 TABLET ORAL at 05:32

## 2020-12-16 RX ADMIN — FENTANYL CITRATE 1.15 MICROGRAM(S)/KG/HR: 50 INJECTION INTRAVENOUS at 18:19

## 2020-12-16 RX ADMIN — Medication 10 MILLIGRAM(S): at 22:13

## 2020-12-16 RX ADMIN — HEPARIN SODIUM 1500 UNIT(S)/HR: 5000 INJECTION INTRAVENOUS; SUBCUTANEOUS at 11:49

## 2020-12-16 RX ADMIN — MIDODRINE HYDROCHLORIDE 30 MILLIGRAM(S): 2.5 TABLET ORAL at 22:04

## 2020-12-16 RX ADMIN — CHLORHEXIDINE GLUCONATE 15 MILLILITER(S): 213 SOLUTION TOPICAL at 17:33

## 2020-12-16 RX ADMIN — BUMETANIDE 5 MG/HR: 0.25 INJECTION INTRAMUSCULAR; INTRAVENOUS at 22:05

## 2020-12-16 RX ADMIN — HEPARIN SODIUM 1300 UNIT(S)/HR: 5000 INJECTION INTRAVENOUS; SUBCUTANEOUS at 04:27

## 2020-12-16 RX ADMIN — MIDODRINE HYDROCHLORIDE 30 MILLIGRAM(S): 2.5 TABLET ORAL at 14:08

## 2020-12-16 RX ADMIN — METHADONE HYDROCHLORIDE 20 MILLIGRAM(S): 40 TABLET ORAL at 05:32

## 2020-12-16 RX ADMIN — Medication 4: at 17:49

## 2020-12-16 RX ADMIN — METHADONE HYDROCHLORIDE 20 MILLIGRAM(S): 40 TABLET ORAL at 14:08

## 2020-12-16 RX ADMIN — HUMAN INSULIN 10 UNIT(S): 100 INJECTION, SUSPENSION SUBCUTANEOUS at 05:10

## 2020-12-16 RX ADMIN — FENTANYL CITRATE 1.15 MICROGRAM(S)/KG/HR: 50 INJECTION INTRAVENOUS at 06:20

## 2020-12-16 RX ADMIN — CHLORHEXIDINE GLUCONATE 1 APPLICATION(S): 213 SOLUTION TOPICAL at 02:49

## 2020-12-16 RX ADMIN — IMIPENEM AND CILASTATIN 200 MILLIGRAM(S): 250; 250 INJECTION, POWDER, FOR SOLUTION INTRAVENOUS at 14:56

## 2020-12-16 RX ADMIN — HEPARIN SODIUM 1700 UNIT(S)/HR: 5000 INJECTION INTRAVENOUS; SUBCUTANEOUS at 20:14

## 2020-12-16 RX ADMIN — Medication 10 MILLIGRAM(S): at 14:56

## 2020-12-16 RX ADMIN — DEXMEDETOMIDINE HYDROCHLORIDE IN 0.9% SODIUM CHLORIDE 14.4 MICROGRAM(S)/KG/HR: 4 INJECTION INTRAVENOUS at 17:33

## 2020-12-16 RX ADMIN — METHADONE HYDROCHLORIDE 20 MILLIGRAM(S): 40 TABLET ORAL at 22:13

## 2020-12-16 NOTE — PROGRESS NOTE ADULT - SUBJECTIVE AND OBJECTIVE BOX
Follow Up:  covid pneumonia, respiratory failure, MSSA bacteremia, Proteus bacteremia, Stenotrophomonas positive culture, CKD     Inverval History/ROS:  T max 100.6   remains intubated, sedated.    Allergies  Kiwi (Unknown)  latex (Anaphylaxis)  latex (Unknown)  penicillin (Other)  penicillin (Unknown)  perfume  hives (Other)  potassium acetate (Other)  soap additives hives (Other)        ANTIMICROBIALS:  imipenem/cilastatin  IVPB 250 every 12 hours      OTHER MEDS:  chlorhexidine 0.12% Liquid 15 milliLiter(s) Oral Mucosa every 12 hours  chlorhexidine 4% Liquid 1 Application(s) Topical <User Schedule>  dexMEDEtomidine Infusion 0.5 MICROgram(s)/kG/Hr IV Continuous <Continuous>  diazepam    Tablet 10 milliGRAM(s) Oral three times a day  fentaNYL   Infusion..... 0.5 MICROgram(s)/kG/Hr IV Continuous <Continuous>  heparin  Infusion. 1100 Unit(s)/Hr IV Continuous <Continuous>  insulin lispro (ADMELOG) corrective regimen sliding scale   SubCutaneous every 6 hours  insulin NPH human recombinant 10 Unit(s) SubCutaneous every 6 hours  methadone   Solution 20 milliGRAM(s) Oral three times a day  midodrine 30 milliGRAM(s) Oral every 8 hours  mupirocin 2% Ointment 1 Application(s) Both Nostrils two times a day  norepinephrine Infusion 0.1 MICROgram(s)/kG/Min IV Continuous <Continuous>  pantoprazole  Injectable 40 milliGRAM(s) IV Push daily  polyethylene glycol 3350 17 Gram(s) Oral daily  senna Syrup 15 milliLiter(s) Oral two times a day      Vital Signs Last 24 Hrs  T(C): 37.1 (16 Dec 2020 08:00), Max: 38.1 (15 Dec 2020 20:00)  T(F): 98.7 (16 Dec 2020 08:00), Max: 100.6 (15 Dec 2020 20:00)  HR: 82 (16 Dec 2020 14:00) (66 - 82)  BP: --  BP(mean): --  RR: 30 (16 Dec 2020 14:00) (17 - 32)  SpO2: 99% (16 Dec 2020 14:00) (92% - 100%)    PHYSICAL EXAM:  General: intubated, sedated  HEAD/EYES: eschar lower lips, chin, rt IJ shiley  ENT: orally intubated, NGT  Cardiovascular: distant  Respiratory: clear ant  GI:   soft, umbilical hernia, multiple well healed scars, dressings groin bilat.  :   negro    Musculoskeletal:  no synovitis  Neurologic: sedated  Skin:  no rash  Psychiatric: unable  Lines: right axillary A line, Rt IJ Shiley, peripheral bilat                            7.7    7.63  )-----------( 155      ( 16 Dec 2020 03:51 )             25.6       12-16    134<L>  |  99  |  49<H>  ----------------------------<  138<H>  3.8   |  22  |  3.10<H>    Ca    8.6      16 Dec 2020 03:51  Phos  4.4     12-16  Mg     1.8     12-16    TPro  6.0  /  Alb  2.1<L>  /  TBili  1.4<H>  /  DBili  x   /  AST  25  /  ALT  <5<L>  /  AlkPhos  99  12-16          MICROBIOLOGY:  v  .Sputum Sputum  12-12-20   Moderate Stenotrophomonas maltophilia  Normal Respiratory Jocelynn absent  --  Stenotrophomonas maltophilia      .Blood Blood-Venous  12-11-20   No growth to date.  --  --      .Urine Catheterized  12-11-20   No growth  --  --      .Urine Catheterized  12-09-20   No growth  --  --      .Blood Blood-Peripheral  12-07-20   No Growth Final  --  --      .Sputum Sputum  12-01-20   Moderate Stenotrophomonas maltophilia  Normal Respiratory Jocelynn present  --  Stenotrophomonas maltophilia      .Blood Blood-Peripheral  12-01-20   No Growth Final  --  --      .Urine Clean Catch (Midstream)  11-30-20   <10,000 CFU/mL Normal Urogenital Jocelynn  --  --      .Blood Blood-Peripheral  11-28-20   Growth in aerobic and anaerobic bottles: Staphylococcus aureus See  previous culture [id number]  --    Growth in aerobic bottle: Gram Positive Cocci in Clusters  Growth in anaerobic bottle: Gram Positive Cocci in Clusters      .Blood Blood-Peripheral  11-27-20   Growth in aerobic and anaerobic bottles: Staphylococcus aureus  Growth in anaerobic bottle: Staphylococcus hominis  Coag Negative Staphylococcus  Single set isolate, possible contaminant. Contact  Microbiology if susceptibility testing clinically  indicated.  ***Blood Panel PCR results on this specimen are available  approximately 3 hours after the Gram stain result.***  Gram stain, PCR, and/or culture results may not always  correspond due to difference in methodologies.  ************************************************************  This PCR assay was performed using [employer].  The following targets are tested for: Enterococcus,  vancomycin resistant enterococci, Listeria monocytogenes,  coagulase negative staphylococci, S. aureus,  methicillin resistant S. aureus, Streptococcus agalactiae  (Group B), S. pneumoniae, S. pyogenes (Group A),  Acinetobacter baumannii, Enterobacter cloacae, E. coli,  Klebsiella oxytoca, K. pneumoniae, Proteus sp.,  Serratia marcescens, Haemophilus influenzae,  Neisseria meningitidis, Pseudomonas aeruginosa, Candida  albicans, C. glabrata, C krusei, C parapsilosis,  C. tropicalis and the KPC resistance gene.  --  Blood Culture PCR  Staphylococcus aureus      .Sputum  11-25-20   Numerous Staphylococcus aureus  Normal Respiratory Jocelynn absent  --  Staphylococcus aureus      .Sputum Sputum  11-24-20   Moderate Staphylococcus aureus  Normal Respiratory Jocelynn absent  --  Staphylococcus aureus      .Blood Blood-Peripheral  11-19-20   No Growth Final  --  --      RADIOLOGY:    rd< from: Xray Chest 1 View- PORTABLE-Urgent (Xray Chest 1 View- PORTABLE-Urgent .) (12.16.20 @ 10:58) >  EXAM:  XR CHEST PORTABLE URGENT 1V        PROCEDURE DATE:  Dec 16 2020         INTERPRETATION:  TIME OF EXAM: December 16, 2020 at 10:57 AM    CLINICAL INFORMATION: ARDS secondary to Covid 19 pneumonia. Tube position.    TECHNIQUE:   Portable chest    INTERPRETATION:    Tip of the endotracheal tube is above the yuniel. Enteric tube with tip in the body of the stomach. Right IJ catheter may be Shiley.    Relatively localized large airspace opacity at the right lung base perhaps aspiration pneumonia superimposed over more generalized ARDS appearance. No pneumothorax or change from the last exam.      COMPARISON:  December 13      IMPRESSION:  1. Endotracheal and enteric tube in addition to right IJ catheter in satisfactory position.  2. No change in right lung base opacity questionable aspiration pneumonia overlying ARDS.              DAVIS VARGAS MD; Attending Radiologist  This document has been electronically signed. Dec 16 2020 11:15AM    < end of copied text >

## 2020-12-16 NOTE — PROGRESS NOTE ADULT - ASSESSMENT
This is a 60 year old Female w/ pmh of HTN, DVT on Xarelto, peritonitis s/p Oral's procedure and ileostomy (reversed 2011), AARON who presented to [facility] on 11/18  w/ fevers found to have COVID-19, intubated 11/23, course complicated by superimposed PNA and bacteremia with Stenotrophomonas (12/11, completed Levaquin x7 days), MSSA/P. mirablis bacteremia (on Cefazolin, potentially due to urine vs line-associated and MSSA in sputum due to PNA), also with lung abscesses on CT chest on 12/5 , ARTEMIO/ATN requiring CRRT and HD. Now unable to wean from ventilator and transferred from [facility] on 12/10 to offload COVID unit       #Neuro  Alert & oriented x3 @baseline.   - Sedated on Precedex and fentanyl for tachypnea   -will continue to come down on fentanyl and c/w methadone 20mg TID and vailum will increase if needed   - Pt current mental status most likely encephalopathic from renal failure and uremia  -CTH negative   -ammonia level was 66     #Resp  Acute hypoxemic respiratory failure 2/2 covid-19 PNA. Intubated on 11/23, now unable to wean from ventilator   - c/w AC 30/370/8/30% unable to tolerate PS trial today   -will continue Pressure support trials daily  -consulted for tracheostomy but will most likely need CTsx     #CV  Hx of HTN, DVT on Xarelto now in shock state requiring pressors most likely vasoplegic from sedatives   - Continue Midodrine at 30 TID  -c/w norepinephrine gtt and titrate to maintain MAP>65  - Continue heparin gtt  - CT negative for PE    #GI  - Continue TF, tolerating well  - Continue bowel regimen  -c/w PPI     #  ARTEMIO 2/2 ATN requiring CRRT, then bumex challenge and now intermittent HD, trialed with bumex yesterday with okay response   -last HD 12/14, no plan for HD today  -renal following   -will trend electrolytes   - negro in place  -strict I’s and O’s    #ID  Covid 19 pneumonia, MSSA bacteremia/pneumonia, now cavitary consolidation on CT chest.  - s/p remdesivir and dexamethasone   - + Sputum/blood MSSA, + P mirablis in blood, +Stenotrophomonas in sputum   - Was previously on Cefazolin for MSSA bacteremia. Pt continues with fevers, ABX broaden to Fadia and Vanco on 12/11  - s/p Levofloxacin   - caspofungin discontinued, no fungal growth.   - Blood cx from 12/7 NGTD. 12/12 sputum with gram negative rods now speciated to Stenotrophomonas   -will D/C meropenem (12/11-12/16)   - ID consulted recommending imipenem 250mg q12h x 2 weeks for cavitation and MSSA      #Heme  Hx DVT on xarelto. Anemic 12/13 with no evidence of bleeding s/p 1u PRBC   -Anticoagulation with Heparin gtt   -continue to trend CBC/ PTT     #Endo  - ISS q6hrs  - NPH q6hrs

## 2020-12-16 NOTE — PROGRESS NOTE ADULT - PROBLEM SELECTOR PLAN 1
Pt. with ARTEMIO in the setting of COVID-19. Pt. with likely ATN. Scr was WNL at 0.53 on 11/23/20 and progressively worsened to 3.0 on 11/27. Patient was initiated on CRRT (at [facility]) on 11/27/20 for oliguric ARTEMIO. CRRT was discontinued on 12/6/20 and transitioned to intermittent HD on 12/8/20. Pt. subsequently started on diuretics and remained non-oliguric until 12/11. Pt. however clinically volume overloaded on exam on 12/11/20 and underwent HD with 2L UF. Last HD treatment on 12/14/20 with 1.5L UF. Pt. given 4 mg IV Bumex with 500 mL urine output. Recommend starting Bumex infusion to maintain urine output and improve volume status. Will assess need for HD daily. Monitor labs and urine output. Avoid any potential nephrotoxins. Dose medications as per eGFR.     If any questions, please feel free to contact me  Hermann Presley   Nephrology Fellow  [phone]  (After 5 pm or on weekends please page the on-call fellow)

## 2020-12-16 NOTE — PROGRESS NOTE ADULT - SUBJECTIVE AND OBJECTIVE BOX
Interval Events:   -low PTT overnight     HPI:  Patient is a 61yo F w/ AARON, peritonitis s/p Oral's procedure and ileostomy (reversed 2011), HTN, prior DVT/PE, ?Asthma admitted on 11/18/2020 to [facility] after presenting for  productive cough  w/ SOB x9 days and diarrhea x7 days and fever x1 day. Patient was found to be COVID + on 11/17/2020 and completed remdesivir 11/18-11/22. Patient was intubated for airway protection on 11/23/2020. Patient has required proning (last 12/4/2020). Hospital course c/b by ATN, requiring CVVHD now on intermittent HD, unresponsive to bumex challenge last HD 12/8. Course also c/b Stenotrophomonas (12/11, completed Levaquin x7 days), MSSA/P. mirablis bacteremia (on Cefazolin, potentially due to urine vs line-associated and MSSA in sputum due to PNA), also with lung abscesses on CT chest on 12/5.  Patient currently on Volume AC 30/350/8/35% on Precedex. Patient intermittently opens eyes but is not yet responsive. Transferred over to [facility] on 12/10 to offload [facility] COVID ICU    (10 Dec 2020 14:13)      REVIEW OF SYSTEMS:  Constitutional: [ ] negative [ ] fevers [ ] chills [ ] weight loss [ ] weight gain  HEENT: [ ] negative [ ] dry eyes [ ] eye irritation [ ] postnasal drip [ ] nasal congestion  CV: [ ] negative  [ ] chest pain [ ] orthopnea [ ] palpitations [ ] murmur  Resp: [ ] negative [ ] cough [ ] shortness of breath [ ] dyspnea [ ] wheezing [ ] sputum [ ] hemoptysis  GI: [ ] negative [ ] nausea [ ] vomiting [ ] diarrhea [ ] constipation [ ] abd pain [ ] dysphagia   : [ ] negative [ ] dysuria [ ] nocturia [ ] hematuria [ ] increased urinary frequency  Musculoskeletal: [ ] negative [ ] back pain [ ] myalgias [ ] arthralgias [ ] fracture  Skin: [ ] negative [ ] rash [ ] itch  Neurological: [ ] negative [ ] headache [ ] dizziness [ ] syncope [ ] weakness [ ] numbness  Psychiatric: [ ] negative [ ] anxiety [ ] depression  Endocrine: [ ] negative [ ] diabetes [ ] thyroid problem  Hematologic/Lymphatic: [ ] negative [ ] anemia [ ] bleeding problem  Allergic/Immunologic: [ ] negative [ ] itchy eyes [ ] nasal discharge [ ] hives [ ] angioedema  [ ] All other systems negative  [x ] Unable to assess ROS because patient is intubated/sedated    OBJECTIVE:  ICU Vital Signs Last 24 Hrs  T(C): 37.1 (16 Dec 2020 08:00), Max: 38.1 (15 Dec 2020 20:00)  T(F): 98.7 (16 Dec 2020 08:00), Max: 100.6 (15 Dec 2020 20:00)  HR: 72 (16 Dec 2020 12:00) (66 - 79)  BP: --  BP(mean): --  ABP: 112/46 (16 Dec 2020 12:00) (95/41 - 157/72)  ABP(mean): 69 (16 Dec 2020 12:00) (59 - 103)  RR: 19 (16 Dec 2020 12:00) (17 - 32)  SpO2: 100% (16 Dec 2020 12:00) (91% - 100%)    Mode: AC/ CMV (Assist Control/ Continuous Mandatory Ventilation), RR (machine): 30, TV (machine): 370, FiO2: 60, PEEP: 8, ITime: 0.82, MAP: 20, PIP: 49    12-15 @ 07:01  -  12-16 @ 07:00  --------------------------------------------------------  IN: 2714.5 mL / OUT: 505 mL / NET: 2209.5 mL    12-16 @ 07:01  -  12-16 @ 12:20  --------------------------------------------------------  IN: 473.5 mL / OUT: 166 mL / NET: 307.5 mL      CAPILLARY BLOOD GLUCOSE      POCT Blood Glucose.: 141 mg/dL (16 Dec 2020 11:51)    Physical Exam:   Constitutional: ill appearing, no acute distress   HEENT: + PERRLA, EOMI, no drainage or redness  Neck: supple,  No JVD  Respiratory: Ventilator assisted breath rhonchi bilaterally to auscultation, no accessory muscle use noted  Cardiovascular: Regular rate, regular rhythm, normal S1, S2; no murmurs or rub  Gastrointestinal: obese, soft, non-tender, non distended, no hepatosplenomegaly, normal bowel sounds  Extremities: + 2 peripheral edema, no cyanosis, no clubbing   Vascular: Equal and normal pulses: 2+ peripheral pulses throughout  Neurological: sedated/intubated  Skin: DTI to chin area, warm, dry, well perfused, no rashes      HOSPITAL MEDICATIONS:  Standing Meds:  chlorhexidine 0.12% Liquid 15 milliLiter(s) Oral Mucosa every 12 hours  chlorhexidine 4% Liquid 1 Application(s) Topical <User Schedule>  dexMEDEtomidine Infusion 0.5 MICROgram(s)/kG/Hr IV Continuous <Continuous>  diazepam    Tablet 2 milliGRAM(s) Oral three times a day  fentaNYL   Infusion..... 0.5 MICROgram(s)/kG/Hr IV Continuous <Continuous>  heparin  Infusion. 1100 Unit(s)/Hr IV Continuous <Continuous>  imipenem/cilastatin  IVPB 250 milliGRAM(s) IV Intermittent every 12 hours  insulin lispro (ADMELOG) corrective regimen sliding scale   SubCutaneous every 6 hours  insulin NPH human recombinant 10 Unit(s) SubCutaneous every 6 hours  methadone   Solution 20 milliGRAM(s) Oral three times a day  midodrine 30 milliGRAM(s) Oral every 8 hours  mupirocin 2% Ointment 1 Application(s) Both Nostrils two times a day  norepinephrine Infusion 0.1 MICROgram(s)/kG/Min IV Continuous <Continuous>  pantoprazole  Injectable 40 milliGRAM(s) IV Push daily  polyethylene glycol 3350 17 Gram(s) Oral daily  senna Syrup 15 milliLiter(s) Oral two times a day      PRN Meds:      LABS:                        7.7    7.63  )-----------( 155      ( 16 Dec 2020 03:51 )             25.6     Hgb Trend: 7.7<--, 8.1<--, 7.8<--, 7.7<--, 7.6<--  12-16    134<L>  |  99  |  49<H>  ----------------------------<  138<H>  3.8   |  22  |  3.10<H>    Ca    8.6      16 Dec 2020 03:51  Phos  4.4     12-16  Mg     1.8     12-16    TPro  6.0  /  Alb  2.1<L>  /  TBili  1.4<H>  /  DBili  x   /  AST  25  /  ALT  <5<L>  /  AlkPhos  99  12-16    Creatinine Trend: 3.10<--, 2.65<--, 4.35<--, 3.74<--, 3.46<--, 2.88<--  PT/INR - ( 16 Dec 2020 03:51 )   PT: 15.7 sec;   INR: 1.39 ratio         PTT - ( 16 Dec 2020 10:54 )  PTT:41.4 sec    Arterial Blood Gas:  12-16 @ 03:51  7.36/42/75/23/93.8/-1.9  ABG lactate: --  Arterial Blood Gas:  12-15 @ 03:49  7.42/36/178/24/99.5/-0.5  ABG lactate: --        MICROBIOLOGY:     RADIOLOGY:  [ ] Reviewed and interpreted by me

## 2020-12-16 NOTE — CONSULT NOTE ADULT - SUBJECTIVE AND OBJECTIVE BOX
59 y/o obese female w/ PMHx AARON, peritonitis s/p Oral's procedure and ileostomy (reversed ), HTN, prior DVT/PE (on xarelto), Asthma admitted to [facility] on 2020 w/ COVID + PNA and completed remdesivir -.  Intubated for airway protection on 2020, and eventually transferred to [facility] on 12/10.  Hospital course c/b by ATN, requiring CVVHD now on intermittent HD.  Course also c/b Stenotrophomonas PNA (as recently as 20), MSSA/P. mirablis bacteremia, and also with lung abscesses on CT chest on .    Subjective: Patient intubated; Precedex and Fentanyl drips just recently turned-off 15 min ago, and patient unresponsive at this time.    Vital Signs:  Vital Signs Last 24 Hrs  T(C): 37.1 (12-16-20 @ 08:00), Max: 38.1 (12-15-20 @ 20:00)  T(F): 98.7 (12-16-20 @ 08:00), Max: 100.6 (12-15-20 @ 20:00)  HR: 75 (12-16-20 @ 12:45) (66 - 79)  BP: 110/43  RR: 23 (12-16-20 @ 12:45) (17 - 32)  SpO2: 100% (12-16-20 @ 12:45) (92% - 100%) on (O2)      General: Patient intubated; Precedex and Fentanyl drips just recently turned-off 15 min ago, and patient unresponsive at this time  Eyes: Scleras clear, PERRLA  ENT: grossly patent pharynx  Neck: Neck supple, trachea midline, No JVD  Respiratory: Coarse breath sounds B/L  CV: S1S2; no audible murmurs  Abdominal: +BS's x4, soft, ND/NT; Large ventral hernia  Extremities: No edema, + peripheral pulses  Skin: No Rashes, Hematoma, Ecchymosis                          7.7    7.63  )-----------( 155      ( 16 Dec 2020 03:51 )             25.6       134<L>  |  99  |  49<H>  ----------------------------<  138<H>  3.8   |  22  |  3.10<H>    Ca    8.6      Phos  4.4      Mg     1.8       TPro  6.0  /  Alb  2.1<L>  /  TBili  1.4<H>  /  DBili  x   /  AST  25  /  ALT  <5<L>  /  AlkPhos  99    PT: 15.7 sec;   INR: 1.39 ratio      PTT:41.4 sec      MEDICATIONS  (STANDING):  chlorhexidine 0.12% Liquid 15 milliLiter(s) Oral Mucosa every 12 hours  chlorhexidine 4% Liquid 1 Application(s) Topical <User Schedule>  dexMEDEtomidine Infusion 0.5 MICROgram(s)/kG/Hr (14.4 mL/Hr) IV Continuous <Continuous>  diazepam    Tablet 10 milliGRAM(s) Oral three times a day  fentaNYL   Infusion..... 0.5 MICROgram(s)/kG/Hr (1.15 mL/Hr) IV Continuous <Continuous>  heparin  Infusion. 1100 Unit(s)/Hr (11 mL/Hr) IV Continuous <Continuous>  imipenem/cilastatin  IVPB 250 milliGRAM(s) IV Intermittent every 12 hours  insulin lispro (ADMELOG) corrective regimen sliding scale   SubCutaneous every 6 hours  insulin NPH human recombinant 10 Unit(s) SubCutaneous every 6 hours  methadone   Solution 20 milliGRAM(s) Oral three times a day  midodrine 30 milliGRAM(s) Oral every 8 hours  mupirocin 2% Ointment 1 Application(s) Both Nostrils two times a day  norepinephrine Infusion 0.1 MICROgram(s)/kG/Min (21.6 mL/Hr) IV Continuous <Continuous>  pantoprazole  Injectable 40 milliGRAM(s) IV Push daily  polyethylene glycol 3350 17 Gram(s) Oral daily  senna Syrup 15 milliLiter(s) Oral two times a day      Assessment  59 y/o female w/ PAST MEDICAL & SURGICAL HISTORY:  Pneumomediastinum  Umbilical hernia - Reduciable  Osteoarthritis of both knees, unspecified osteoarthritis type  AARON (Obstructive Sleep Apnea): category II pt uses c/pap pt to bering to hospital/  OR booking notified  Pneumonia -   GERD (Gastroesophageal Reflux Disease)  Asthma - diagnosed   on adbvair denies attacks  DVT (Deep Venous Thrombosis) - left leg 6 m s/p knee replacement in 2010  HTN (Hypertension)  H/O ileostomy - Ileostomy Revesal 2011  S/P Exploratory Laparotomy -   peritonitis  s/p right knee replacement/ colon resection and ileostomy  S/P Colonoscopy   S/P Endoscopy  gerd  S/P  Section   History of Tubal Ligation s/p c/section   S/P Knee Surgery -     59 y/o female w/ PMHx AARON, peritonitis s/p Oral's procedure and ileostomy (reversed ), HTN, prior DVT/PE (on xarelto), Asthma admitted to [facility] on 2020 w/ COVID + PNA.   Intubated for airway protection on 2020, and eventually transferred to [facility] on 12/10.     PLAN  Current vent settings: A/C 60%, PEEP 8  Tentative plan is for tracheostomy/PEG on Friday, 20  Will need to be NPO after midnight on Thursday night.  New Type & Screen to be drawn tomorrow morning.  Recommendations for holding heparin night before surgery to follow.    Currently on levophed drip for BP control    Fentanyl and precedex drips turned-off 15min ago to assess neuro status  Patient unresponsive during my exam    Imepenem for MSSA & Proteus bacteremia  ID following    CRRT initiated on 20 for oliguric ARTEMIO, and discontinued on 20; transitioned to intermittent HD on 20. Pt. subsequently started on diuretics and remained non-oliguric until .  Began HD with last HD treatment on 20.  Nephrology following.    Heparin drip for history of DVT    OGT enteral feeds    Continue care as per Primary Team    Patient discussed with Dr. Brock 61 y/o obese female w/ PMHx AARON, peritonitis s/p Oral's procedure and ileostomy (reversed ), HTN, prior DVT/PE (on xarelto), Asthma admitted to [facility] on 2020 w/ COVID + PNA and completed remdesivir -.  Intubated for airway protection on 2020, and eventually transferred to [facility] on 12/10.  Hospital course c/b by ATN, requiring CVVHD now on intermittent HD.  Course also c/b Stenotrophomonas PNA (as recently as 20), MSSA/P. mirablis bacteremia, and also with lung abscesses on CT chest on .    Subjective: Patient intubated; Precedex and Fentanyl drips just recently turned-off 15 min ago, and patient unresponsive at this time.    Vital Signs:  Vital Signs Last 24 Hrs  T(C): 37.1 (12-16-20 @ 08:00), Max: 38.1 (12-15-20 @ 20:00)  T(F): 98.7 (12-16-20 @ 08:00), Max: 100.6 (12-15-20 @ 20:00)  HR: 75 (12-16-20 @ 12:45) (66 - 79)  BP: 110/43  RR: 23 (12-16-20 @ 12:45) (17 - 32)  SpO2: 100% (12-16-20 @ 12:45) (92% - 100%) on (O2)      General: Patient intubated; Precedex and Fentanyl drips just recently turned-off 15 min ago, and patient unresponsive at this time  Eyes: Scleras clear, PERRLA  ENT: grossly patent pharynx  Neck: Neck supple, trachea midline, No JVD  Respiratory: Coarse breath sounds B/L  CV: S1S2; no audible murmurs  Abdominal: +BS's x4, soft, ND/NT; Large ventral hernia  Extremities: No edema, + peripheral pulses  Skin: No Rashes, Hematoma, Ecchymosis                          7.7    7.63  )-----------( 155      ( 16 Dec 2020 03:51 )             25.6       134<L>  |  99  |  49<H>  ----------------------------<  138<H>  3.8   |  22  |  3.10<H>    Ca    8.6      Phos  4.4      Mg     1.8       TPro  6.0  /  Alb  2.1<L>  /  TBili  1.4<H>  /  DBili  x   /  AST  25  /  ALT  <5<L>  /  AlkPhos  99    PT: 15.7 sec;   INR: 1.39 ratio      PTT:41.4 sec      MEDICATIONS  (STANDING):  chlorhexidine 0.12% Liquid 15 milliLiter(s) Oral Mucosa every 12 hours  chlorhexidine 4% Liquid 1 Application(s) Topical <User Schedule>  dexMEDEtomidine Infusion 0.5 MICROgram(s)/kG/Hr (14.4 mL/Hr) IV Continuous <Continuous>  diazepam    Tablet 10 milliGRAM(s) Oral three times a day  fentaNYL   Infusion..... 0.5 MICROgram(s)/kG/Hr (1.15 mL/Hr) IV Continuous <Continuous>  heparin  Infusion. 1100 Unit(s)/Hr (11 mL/Hr) IV Continuous <Continuous>  imipenem/cilastatin  IVPB 250 milliGRAM(s) IV Intermittent every 12 hours  insulin lispro (ADMELOG) corrective regimen sliding scale   SubCutaneous every 6 hours  insulin NPH human recombinant 10 Unit(s) SubCutaneous every 6 hours  methadone   Solution 20 milliGRAM(s) Oral three times a day  midodrine 30 milliGRAM(s) Oral every 8 hours  mupirocin 2% Ointment 1 Application(s) Both Nostrils two times a day  norepinephrine Infusion 0.1 MICROgram(s)/kG/Min (21.6 mL/Hr) IV Continuous <Continuous>  pantoprazole  Injectable 40 milliGRAM(s) IV Push daily  polyethylene glycol 3350 17 Gram(s) Oral daily  senna Syrup 15 milliLiter(s) Oral two times a day      Assessment  61 y/o female w/ PAST MEDICAL & SURGICAL HISTORY:  Pneumomediastinum  Umbilical hernia - Reduciable  Osteoarthritis of both knees, unspecified osteoarthritis type  AARON (Obstructive Sleep Apnea): category II pt uses c/pap pt to bering to hospital/  OR booking notified  Pneumonia -   GERD (Gastroesophageal Reflux Disease)  Asthma - diagnosed   on adbvair denies attacks  DVT (Deep Venous Thrombosis) - left leg 6 m s/p knee replacement in 2010  HTN (Hypertension)  H/O ileostomy - Ileostomy Revesal 2011  S/P Exploratory Laparotomy -   peritonitis  s/p right knee replacement/ colon resection and ileostomy  S/P Colonoscopy   S/P Endoscopy  gerd  S/P  Section   History of Tubal Ligation s/p c/section   S/P Knee Surgery -     61 y/o female w/ PMHx AARON, peritonitis s/p Oral's procedure and ileostomy (reversed ), HTN, prior DVT/PE (on xarelto), Asthma admitted to [facility] on 2020 w/ COVID + PNA.   Intubated for airway protection on 2020, and eventually transferred to [facility] on 12/10.     PLAN  Current vent settings: A/C 60%, PEEP 8  OGT enteral feeds  Tentative plan is for tracheostomy/PEG on Friday, 20  Will need to be NPO after midnight on Thursday night.  New Type & Screen to be drawn tomorrow morning.  Recommendations for holding heparin night before surgery to follow.    Currently on levophed drip for BP control    Fentanyl and precedex drips turned-off 15min ago to assess neuro status  Patient unresponsive during my exam    Imepenem for MSSA & Proteus bacteremia  ID following    CRRT initiated on 20 for oliguric ARTEMIO, and discontinued on 20; transitioned to intermittent HD on 20. Pt. subsequently started on diuretics and remained non-oliguric until .  Began HD with last HD treatment on 20.  Nephrology following.    Heparin drip for history of DVT    Continue care as per Primary Team    Patient discussed with Dr. Brock

## 2020-12-16 NOTE — PROGRESS NOTE ADULT - SUBJECTIVE AND OBJECTIVE BOX
[facility] DIVISION OF KIDNEY DISEASES AND HYPERTENSION -- FOLLOW UP NOTE  --------------------------------------------------------------------------------  Chief Complaint: ARTEMIO in the setting of COVID-19, on RRT/dialysis.    24 hour events/subjective: Pt. was seen and examined at bedside today in the CTICU. Pt. transferred from [facility] on 12/10/20. Pt. currently intubated and on IV vasopressor to maintain MAP. Pt. underwent last HD on 12/14/20 with 1.5L UF. Pt. given 4 mg IV Bumex yesterday with 500 ml UO.     PAST HISTORY  --------------------------------------------------------------------------------  No significant changes to PMH, PSH, FHx, SHx, unless otherwise noted    ALLERGIES & MEDICATIONS  --------------------------------------------------------------------------------  Allergies    Kiwi (Unknown)  latex (Anaphylaxis)  latex (Unknown)  penicillin (Other)  penicillin (Unknown)  perfume  hives (Other)  potassium acetate (Other)  soap additives hives (Other)    Intolerances    Standing Inpatient Medications  chlorhexidine 0.12% Liquid 15 milliLiter(s) Oral Mucosa every 12 hours  chlorhexidine 4% Liquid 1 Application(s) Topical <User Schedule>  dexMEDEtomidine Infusion 0.5 MICROgram(s)/kG/Hr IV Continuous <Continuous>  diazepam    Tablet 2 milliGRAM(s) Oral three times a day  fentaNYL   Infusion..... 0.5 MICROgram(s)/kG/Hr IV Continuous <Continuous>  heparin  Infusion. 1100 Unit(s)/Hr IV Continuous <Continuous>  imipenem/cilastatin  IVPB 250 milliGRAM(s) IV Intermittent every 12 hours  insulin lispro (ADMELOG) corrective regimen sliding scale   SubCutaneous every 6 hours  insulin NPH human recombinant 10 Unit(s) SubCutaneous every 6 hours  methadone   Solution 20 milliGRAM(s) Oral three times a day  midodrine 30 milliGRAM(s) Oral every 8 hours  mupirocin 2% Ointment 1 Application(s) Both Nostrils two times a day  norepinephrine Infusion 0.1 MICROgram(s)/kG/Min IV Continuous <Continuous>  pantoprazole  Injectable 40 milliGRAM(s) IV Push daily  polyethylene glycol 3350 17 Gram(s) Oral daily  senna Syrup 15 milliLiter(s) Oral two times a day    REVIEW OF SYSTEMS  --------------------------------------------------------------------------------  Unable to obtain due to medical condition    VITALS/PHYSICAL EXAM  --------------------------------------------------------------------------------  T(C): 37.1 (12-16-20 @ 08:00), Max: 38.1 (12-15-20 @ 20:00)  HR: 68 (12-16-20 @ 09:00) (66 - 79)  BP: --  RR: 30 (12-16-20 @ 09:00) (17 - 32)  SpO2: 97% (12-16-20 @ 09:00) (90% - 99%)  Wt(kg): --    12-15-20 @ 07:01  -  12-16-20 @ 07:00  --------------------------------------------------------  IN: 2714.5 mL / OUT: 505 mL / NET: 2209.5 mL    12-16-20 @ 07:01  -  12-16-20 @ 10:20  --------------------------------------------------------  IN: 195.7 mL / OUT: 46 mL / NET: 149.7 mL    Physical Exam:  	Gen: Intubated  	HEENT: ETT +  	Pulm: Fair air entry B/L  	CV: S1S2+  	Abd: Soft, +BS   	Ext: trace LE edema B/L  	Neuro: sedated  	Vascular access: Right IJ non-tunneled HD catheter site : No bleeding seen    LABS/STUDIES  --------------------------------------------------------------------------------              7.7    7.63  >-----------<  155      [12-16-20 @ 03:51]              25.6     134  |  99  |  49  ----------------------------<  138      [12-16-20 @ 03:51]  3.8   |  22  |  3.10        Ca     8.6     [12-16-20 @ 03:51]      Mg     1.8     [12-16-20 @ 03:51]      Phos  4.4     [12-16-20 @ 03:51]    Creatinine Trend:  SCr 3.10 [12-16 @ 03:51]  SCr 2.65 [12-15 @ 03:49]  SCr 4.35 [12-14 @ 04:13]  SCr 3.74 [12-13 @ 03:30]  SCr 3.46 [12-12 @ 22:10]   [facility] DIVISION OF KIDNEY DISEASES AND HYPERTENSION -- FOLLOW UP NOTE  --------------------------------------------------------------------------------  Chief Complaint: ARTEMIO in the setting of COVID-19, on RRT/dialysis.    24 hour events/subjective: Pt. was seen and examined at bedside today in the CTICU. Pt. transferred from [facility] on 12/10/20. Pt. currently intubated and on IV vasopressor to maintain MAP. Pt. underwent last HD on 12/14/20 with 1.5L UF. Pt. given 4 mg IV Bumex yesterday with 500 ml UO.     PAST HISTORY  --------------------------------------------------------------------------------  No significant changes to PMH, PSH, FHx, SHx, unless otherwise noted    ALLERGIES & MEDICATIONS  --------------------------------------------------------------------------------  Allergies    Kiwi (Unknown)  latex (Anaphylaxis)  latex (Unknown)  penicillin (Other)  penicillin (Unknown)  perfume  hives (Other)  potassium acetate (Other)  soap additives hives (Other)    Intolerances    Standing Inpatient Medications  chlorhexidine 0.12% Liquid 15 milliLiter(s) Oral Mucosa every 12 hours  chlorhexidine 4% Liquid 1 Application(s) Topical <User Schedule>  dexMEDEtomidine Infusion 0.5 MICROgram(s)/kG/Hr IV Continuous <Continuous>  diazepam    Tablet 2 milliGRAM(s) Oral three times a day  fentaNYL   Infusion..... 0.5 MICROgram(s)/kG/Hr IV Continuous <Continuous>  heparin  Infusion. 1100 Unit(s)/Hr IV Continuous <Continuous>  imipenem/cilastatin  IVPB 250 milliGRAM(s) IV Intermittent every 12 hours  insulin lispro (ADMELOG) corrective regimen sliding scale   SubCutaneous every 6 hours  insulin NPH human recombinant 10 Unit(s) SubCutaneous every 6 hours  methadone   Solution 20 milliGRAM(s) Oral three times a day  midodrine 30 milliGRAM(s) Oral every 8 hours  mupirocin 2% Ointment 1 Application(s) Both Nostrils two times a day  norepinephrine Infusion 0.1 MICROgram(s)/kG/Min IV Continuous <Continuous>  pantoprazole  Injectable 40 milliGRAM(s) IV Push daily  polyethylene glycol 3350 17 Gram(s) Oral daily  senna Syrup 15 milliLiter(s) Oral two times a day    REVIEW OF SYSTEMS  --------------------------------------------------------------------------------  Unable to obtain due to medical condition    VITALS/PHYSICAL EXAM  --------------------------------------------------------------------------------  T(C): 37.1 (12-16-20 @ 08:00), Max: 38.1 (12-15-20 @ 20:00)  HR: 68 (12-16-20 @ 09:00) (66 - 79)  BP: --  RR: 30 (12-16-20 @ 09:00) (17 - 32)  SpO2: 97% (12-16-20 @ 09:00) (90% - 99%)  Wt(kg): --    12-15-20 @ 07:01  -  12-16-20 @ 07:00  --------------------------------------------------------  IN: 2714.5 mL / OUT: 505 mL / NET: 2209.5 mL    12-16-20 @ 07:01  -  12-16-20 @ 10:20  --------------------------------------------------------  IN: 195.7 mL / OUT: 46 mL / NET: 149.7 mL    Physical Exam (patient examined by inspection given COVID 19)  	Gen: Intubated  	HEENT: ETT +  	Pulm: on vent  	CV: regular rate  	Ext: trace LE edema B/L  	Neuro: sedated  	Vascular access: Right IJ non-tunneled HD catheter site : No bleeding seen                   : marky     LABS/STUDIES  --------------------------------------------------------------------------------              7.7    7.63  >-----------<  155      [12-16-20 @ 03:51]              25.6     134  |  99  |  49  ----------------------------<  138      [12-16-20 @ 03:51]  3.8   |  22  |  3.10        Ca     8.6     [12-16-20 @ 03:51]      Mg     1.8     [12-16-20 @ 03:51]      Phos  4.4     [12-16-20 @ 03:51]    Creatinine Trend:  SCr 3.10 [12-16 @ 03:51]  SCr 2.65 [12-15 @ 03:49]  SCr 4.35 [12-14 @ 04:13]  SCr 3.74 [12-13 @ 03:30]  SCr 3.46 [12-12 @ 22:10]

## 2020-12-16 NOTE — PROGRESS NOTE ADULT - ATTENDING COMMENTS
59 y/o F with PMH as above presented to the hospital with shortness of breath on 11/18, developed progressive hypoxic respiratory failure requiring intubation on 11/23.  Patient transferred to [facility] on 12/10.  Hospital course c/b lung abscess, MSSA bacteremia, now with ARTEMIO requiring HD, will discuss with nephro re: HD today.  Patient with Stenotrophomonas in the sputum, likely a colonizer and she completed a course of abx, cont imipenem for aspiration PNA and MSSA bacteremia, cultures cleared.  Will wean sedation as tolerate, attempt PSV trial. Constult CTS for trach/PEG. Prognosis guarded.

## 2020-12-16 NOTE — PROGRESS NOTE ADULT - ASSESSMENT
61 yo woman with AARON, h/o Lockett's procedure, s/p reversal 2011 who was admitted to [facility] 11/17 with covid pneumonia.  She received course of remdesevir and dexamethasone; required intubation 11/23 for acute hypoxemic respiratory failure. Hospital course complicated by Proteus bacteremia, MSSA bacteremia/ pneumonia, and Stenotrophomonas respiratory infection, as well as ATN requiring CVVHD now HD.  CT chest 12/4 new large consolidation with cavitation in right lung.  Patient transferred [facility] 12/10; spiked high fever; antibiotics broadened to vanco x 1 and meropenem 500 mg iv 24h.  Blood culture 11/27 MSSA and proteus; 11/28 MSSA.  Bacteremia cleared.   Blood cultures 12/1, 12/11 no growth.  Sputum culture 12/1 and 12/12 Stenotrophomonas.    Multifocal covid pneumonia with respiratory failure, MSSA bacteremia 11/27 with cavitary pneumonia, Proteus bacteremia 11/27, Stenotrophomonas pneumonia vs colonization, ATN on HD.  CxR today- large right lung opacity.  perhaps aspiration pneumonia superimposed on ARDS due to covid.  Suspect Stenotrophomonas colonizing respiratory tract.  She recently received a 7 day course of levofloxacin 12/- 12/9.      suggest:  c/w  Imipenem 250 mg iv q 12 h while on HD to cover both aspiration pneumonia and MSSA bacteremia.  if spikes or respiratory parameters worsen can add levofloxacin for anther 7 days.    Mila Burgos MD  Pager: [phone]  After 5 PM or weekends please call fellow on call or office [phone]

## 2020-12-17 ENCOUNTER — TRANSCRIPTION ENCOUNTER (OUTPATIENT)
Age: 60
End: 2020-12-17

## 2020-12-17 LAB
ALBUMIN SERPL ELPH-MCNC: 2.1 G/DL — LOW (ref 3.3–5)
ALP SERPL-CCNC: 138 U/L — HIGH (ref 40–120)
ALT FLD-CCNC: 5 U/L — SIGNIFICANT CHANGE UP (ref 4–33)
ANION GAP SERPL CALC-SCNC: 14 MMOL/L — SIGNIFICANT CHANGE UP (ref 7–14)
APTT BLD: 73.5 SEC — HIGH (ref 27–36.3)
APTT BLD: 82.4 SEC — HIGH (ref 27–36.3)
APTT BLD: 84.4 SEC — HIGH (ref 27–36.3)
AST SERPL-CCNC: 32 U/L — SIGNIFICANT CHANGE UP (ref 4–32)
BASE EXCESS BLDA CALC-SCNC: -0.9 MMOL/L — SIGNIFICANT CHANGE UP (ref -2–2)
BASOPHILS # BLD AUTO: 0.05 K/UL — SIGNIFICANT CHANGE UP (ref 0–0.2)
BASOPHILS NFR BLD AUTO: 0.6 % — SIGNIFICANT CHANGE UP (ref 0–2)
BILIRUB SERPL-MCNC: 1.1 MG/DL — SIGNIFICANT CHANGE UP (ref 0.2–1.2)
BLOOD GAS ARTERIAL COMPREHENSIVE RESULT: SIGNIFICANT CHANGE UP
BUN SERPL-MCNC: 56 MG/DL — HIGH (ref 7–23)
CALCIUM SERPL-MCNC: 8.7 MG/DL — SIGNIFICANT CHANGE UP (ref 8.4–10.5)
CHLORIDE SERPL-SCNC: 96 MMOL/L — LOW (ref 98–107)
CO2 SERPL-SCNC: 22 MMOL/L — SIGNIFICANT CHANGE UP (ref 22–31)
CREAT SERPL-MCNC: 3.21 MG/DL — HIGH (ref 0.5–1.3)
EOSINOPHIL # BLD AUTO: 0.28 K/UL — SIGNIFICANT CHANGE UP (ref 0–0.5)
EOSINOPHIL NFR BLD AUTO: 3.1 % — SIGNIFICANT CHANGE UP (ref 0–6)
GLUCOSE BLDC GLUCOMTR-MCNC: 126 MG/DL — HIGH (ref 70–99)
GLUCOSE BLDC GLUCOMTR-MCNC: 145 MG/DL — HIGH (ref 70–99)
GLUCOSE BLDC GLUCOMTR-MCNC: 154 MG/DL — HIGH (ref 70–99)
GLUCOSE BLDC GLUCOMTR-MCNC: 173 MG/DL — HIGH (ref 70–99)
GLUCOSE BLDC GLUCOMTR-MCNC: 181 MG/DL — HIGH (ref 70–99)
GLUCOSE SERPL-MCNC: 170 MG/DL — HIGH (ref 70–99)
HCG UR QL: NEGATIVE — SIGNIFICANT CHANGE UP
HCO3 BLDA-SCNC: 24 MMOL/L — SIGNIFICANT CHANGE UP (ref 22–26)
HCT VFR BLD CALC: 26.3 % — LOW (ref 34.5–45)
HGB BLD-MCNC: 7.9 G/DL — LOW (ref 11.5–15.5)
IANC: 6.19 K/UL — SIGNIFICANT CHANGE UP (ref 1.5–8.5)
IMM GRANULOCYTES NFR BLD AUTO: 6.2 % — HIGH (ref 0–1.5)
INR BLD: 1.32 RATIO — HIGH (ref 0.88–1.17)
LYMPHOCYTES # BLD AUTO: 1.16 K/UL — SIGNIFICANT CHANGE UP (ref 1–3.3)
LYMPHOCYTES # BLD AUTO: 13 % — SIGNIFICANT CHANGE UP (ref 13–44)
MCHC RBC-ENTMCNC: 29.2 PG — SIGNIFICANT CHANGE UP (ref 27–34)
MCHC RBC-ENTMCNC: 30 GM/DL — LOW (ref 32–36)
MCV RBC AUTO: 97 FL — SIGNIFICANT CHANGE UP (ref 80–100)
MONOCYTES # BLD AUTO: 0.68 K/UL — SIGNIFICANT CHANGE UP (ref 0–0.9)
MONOCYTES NFR BLD AUTO: 7.6 % — SIGNIFICANT CHANGE UP (ref 2–14)
NEUTROPHILS # BLD AUTO: 6.19 K/UL — SIGNIFICANT CHANGE UP (ref 1.8–7.4)
NEUTROPHILS NFR BLD AUTO: 69.5 % — SIGNIFICANT CHANGE UP (ref 43–77)
NRBC # BLD: 0 /100 WBCS — SIGNIFICANT CHANGE UP
NRBC # FLD: 0.07 K/UL — HIGH
PCO2 BLDA: 44 MMHG — SIGNIFICANT CHANGE UP (ref 32–48)
PH BLDA: 7.35 — SIGNIFICANT CHANGE UP (ref 7.35–7.45)
PLATELET # BLD AUTO: 173 K/UL — SIGNIFICANT CHANGE UP (ref 150–400)
PO2 BLDA: 232 MMHG — HIGH (ref 83–108)
POTASSIUM SERPL-MCNC: 3.7 MMOL/L — SIGNIFICANT CHANGE UP (ref 3.5–5.3)
POTASSIUM SERPL-SCNC: 3.7 MMOL/L — SIGNIFICANT CHANGE UP (ref 3.5–5.3)
PROT SERPL-MCNC: 6.4 G/DL — SIGNIFICANT CHANGE UP (ref 6–8.3)
PROTHROM AB SERPL-ACNC: 14.9 SEC — HIGH (ref 9.8–13.1)
RBC # BLD: 2.71 M/UL — LOW (ref 3.8–5.2)
RBC # FLD: 22.2 % — HIGH (ref 10.3–14.5)
SAO2 % BLDA: 100 % — HIGH (ref 95–99)
SODIUM SERPL-SCNC: 132 MMOL/L — LOW (ref 135–145)
WBC # BLD: 8.91 K/UL — SIGNIFICANT CHANGE UP (ref 3.8–10.5)
WBC # FLD AUTO: 8.91 K/UL — SIGNIFICANT CHANGE UP (ref 3.8–10.5)

## 2020-12-17 PROCEDURE — 99233 SBSQ HOSP IP/OBS HIGH 50: CPT

## 2020-12-17 PROCEDURE — 99291 CRITICAL CARE FIRST HOUR: CPT

## 2020-12-17 PROCEDURE — 99232 SBSQ HOSP IP/OBS MODERATE 35: CPT

## 2020-12-17 RX ORDER — FENTANYL CITRATE 50 UG/ML
0.5 INJECTION INTRAVENOUS
Qty: 2500 | Refills: 0 | Status: DISCONTINUED | OUTPATIENT
Start: 2020-12-17 | End: 2020-12-21

## 2020-12-17 RX ORDER — HEPARIN SODIUM 5000 [USP'U]/ML
1700 INJECTION INTRAVENOUS; SUBCUTANEOUS
Qty: 25000 | Refills: 0 | Status: DISCONTINUED | OUTPATIENT
Start: 2020-12-17 | End: 2020-12-19

## 2020-12-17 RX ORDER — HEPARIN SODIUM 5000 [USP'U]/ML
1200 INJECTION INTRAVENOUS; SUBCUTANEOUS
Qty: 25000 | Refills: 0 | Status: DISCONTINUED | OUTPATIENT
Start: 2020-12-17 | End: 2020-12-17

## 2020-12-17 RX ADMIN — METHADONE HYDROCHLORIDE 20 MILLIGRAM(S): 40 TABLET ORAL at 21:19

## 2020-12-17 RX ADMIN — HUMAN INSULIN 10 UNIT(S): 100 INJECTION, SUSPENSION SUBCUTANEOUS at 13:10

## 2020-12-17 RX ADMIN — FENTANYL CITRATE 1.15 MICROGRAM(S)/KG/HR: 50 INJECTION INTRAVENOUS at 06:29

## 2020-12-17 RX ADMIN — MUPIROCIN 1 APPLICATION(S): 20 OINTMENT TOPICAL at 18:26

## 2020-12-17 RX ADMIN — PANTOPRAZOLE SODIUM 40 MILLIGRAM(S): 20 TABLET, DELAYED RELEASE ORAL at 12:38

## 2020-12-17 RX ADMIN — DEXMEDETOMIDINE HYDROCHLORIDE IN 0.9% SODIUM CHLORIDE 14.4 MICROGRAM(S)/KG/HR: 4 INJECTION INTRAVENOUS at 20:00

## 2020-12-17 RX ADMIN — Medication 10 MILLIGRAM(S): at 23:51

## 2020-12-17 RX ADMIN — CHLORHEXIDINE GLUCONATE 15 MILLILITER(S): 213 SOLUTION TOPICAL at 18:26

## 2020-12-17 RX ADMIN — MUPIROCIN 1 APPLICATION(S): 20 OINTMENT TOPICAL at 06:42

## 2020-12-17 RX ADMIN — IMIPENEM AND CILASTATIN 200 MILLIGRAM(S): 250; 250 INJECTION, POWDER, FOR SOLUTION INTRAVENOUS at 17:56

## 2020-12-17 RX ADMIN — Medication 2: at 00:11

## 2020-12-17 RX ADMIN — BUMETANIDE 10 MG/HR: 0.25 INJECTION INTRAMUSCULAR; INTRAVENOUS at 18:26

## 2020-12-17 RX ADMIN — MIDODRINE HYDROCHLORIDE 30 MILLIGRAM(S): 2.5 TABLET ORAL at 21:19

## 2020-12-17 RX ADMIN — Medication 2: at 06:46

## 2020-12-17 RX ADMIN — MIDODRINE HYDROCHLORIDE 30 MILLIGRAM(S): 2.5 TABLET ORAL at 06:29

## 2020-12-17 RX ADMIN — MIDODRINE HYDROCHLORIDE 30 MILLIGRAM(S): 2.5 TABLET ORAL at 13:11

## 2020-12-17 RX ADMIN — Medication 10 MILLIGRAM(S): at 16:40

## 2020-12-17 RX ADMIN — FENTANYL CITRATE 1.15 MICROGRAM(S)/KG/HR: 50 INJECTION INTRAVENOUS at 20:00

## 2020-12-17 RX ADMIN — BUMETANIDE 10 MG/HR: 0.25 INJECTION INTRAMUSCULAR; INTRAVENOUS at 20:00

## 2020-12-17 RX ADMIN — Medication 21.6 MICROGRAM(S)/KG/MIN: at 20:00

## 2020-12-17 RX ADMIN — HUMAN INSULIN 10 UNIT(S): 100 INJECTION, SUSPENSION SUBCUTANEOUS at 00:11

## 2020-12-17 RX ADMIN — DEXMEDETOMIDINE HYDROCHLORIDE IN 0.9% SODIUM CHLORIDE 14.4 MICROGRAM(S)/KG/HR: 4 INJECTION INTRAVENOUS at 15:23

## 2020-12-17 RX ADMIN — Medication 10 MILLIGRAM(S): at 06:46

## 2020-12-17 RX ADMIN — HEPARIN SODIUM 1700 UNIT(S)/HR: 5000 INJECTION INTRAVENOUS; SUBCUTANEOUS at 03:54

## 2020-12-17 RX ADMIN — METHADONE HYDROCHLORIDE 20 MILLIGRAM(S): 40 TABLET ORAL at 13:10

## 2020-12-17 RX ADMIN — DEXMEDETOMIDINE HYDROCHLORIDE IN 0.9% SODIUM CHLORIDE 14.4 MICROGRAM(S)/KG/HR: 4 INJECTION INTRAVENOUS at 09:29

## 2020-12-17 RX ADMIN — CHLORHEXIDINE GLUCONATE 15 MILLILITER(S): 213 SOLUTION TOPICAL at 06:29

## 2020-12-17 RX ADMIN — FENTANYL CITRATE 1.15 MICROGRAM(S)/KG/HR: 50 INJECTION INTRAVENOUS at 12:38

## 2020-12-17 RX ADMIN — METHADONE HYDROCHLORIDE 20 MILLIGRAM(S): 40 TABLET ORAL at 06:29

## 2020-12-17 RX ADMIN — CHLORHEXIDINE GLUCONATE 1 APPLICATION(S): 213 SOLUTION TOPICAL at 06:30

## 2020-12-17 RX ADMIN — DEXMEDETOMIDINE HYDROCHLORIDE IN 0.9% SODIUM CHLORIDE 14.4 MICROGRAM(S)/KG/HR: 4 INJECTION INTRAVENOUS at 16:41

## 2020-12-17 RX ADMIN — HEPARIN SODIUM 17 UNIT(S)/HR: 5000 INJECTION INTRAVENOUS; SUBCUTANEOUS at 20:00

## 2020-12-17 RX ADMIN — SENNA PLUS 15 MILLILITER(S): 8.6 TABLET ORAL at 18:28

## 2020-12-17 RX ADMIN — Medication 2: at 13:09

## 2020-12-17 RX ADMIN — SENNA PLUS 15 MILLILITER(S): 8.6 TABLET ORAL at 06:31

## 2020-12-17 RX ADMIN — IMIPENEM AND CILASTATIN 200 MILLIGRAM(S): 250; 250 INJECTION, POWDER, FOR SOLUTION INTRAVENOUS at 02:36

## 2020-12-17 RX ADMIN — FENTANYL CITRATE 1.15 MICROGRAM(S)/KG/HR: 50 INJECTION INTRAVENOUS at 16:41

## 2020-12-17 RX ADMIN — HUMAN INSULIN 10 UNIT(S): 100 INJECTION, SUSPENSION SUBCUTANEOUS at 18:25

## 2020-12-17 RX ADMIN — POLYETHYLENE GLYCOL 3350 17 GRAM(S): 17 POWDER, FOR SOLUTION ORAL at 12:38

## 2020-12-17 RX ADMIN — HUMAN INSULIN 10 UNIT(S): 100 INJECTION, SUSPENSION SUBCUTANEOUS at 06:47

## 2020-12-17 NOTE — PROGRESS NOTE ADULT - ASSESSMENT
This is a 60 year old Female w/ pmh of HTN, DVT on Xarelto, peritonitis s/p Oral's procedure and ileostomy (reversed 2011), AARON who presented to [facility] on 11/18  w/ fevers found to have COVID-19, intubated 11/23, course complicated by superimposed PNA and bacteremia with Stenotrophomonas (12/11, completed Levaquin x7 days), MSSA/P. mirablis bacteremia (on Cefazolin, potentially due to urine vs line-associated and MSSA in sputum due to PNA), also with lung abscesses on CT chest on 12/5 , ARTEMIO/ATN requiring CRRT and HD. Now unable to wean from ventilator and transferred from [facility] on 12/10 to offload COVID unit. Unable to wean from ventilator, plan for trach/PEG with thoracic surgery on friday        #Neuro  Alert & oriented x3 @baseline.   - remains on Precedex and fentanyl for comfort   -will continue to come down on fentanyl if tolerated and c/w methadone and valium will increase if needed   - Pt current mental status most likely encephalopathic from renal failure and uremia  -CTH negative   -ammonia level was 66     #Resp  Acute hypoxemic respiratory failure 2/2 covid-19 PNA. Intubated on 11/23, now unable to wean from ventilator   - c/w AC 30/370/8/30%   -plan for tracheostomy tomorrow     #CV  Hx of HTN, DVT on Xarelto now in shock state requiring pressors most likely vasoplegic from sedatives   - Continue Midodrine at 30 TID  -c/w norepinephrine gtt and titrate to maintain MAP>65  - Continue heparin gtt  - CT negative for PE    #GI  - Continue TF, tolerating well  - Continue bowel regimen  -c/w PPI     #  ARTEMIO 2/2 ATN requiring CRRT, then bumex challenge and now intermittent HD, trialed with bumex yesterday with okay response. Started on bumex gtt overnight, now making good UO   -last HD 12/14, plan for HD today to optimize for OR tomorrow   -renal following   -will trend electrolytes   - negro in place  -strict I’s and O’s    #ID  Covid 19 pneumonia, MSSA bacteremia/pneumonia, now cavitary consolidation on CT chest.  - s/p remdesivir and dexamethasone   - + Sputum/blood MSSA, + P mirablis in blood, +Stenotrophomonas in sputum   - Was previously on Cefazolin for MSSA bacteremia. Pt continues with fevers, ABX broaden to Fadia and Vanco on 12/11  - s/p Levofloxacin   - caspofungin discontinued, no fungal growth.   - Blood cx from 12/7 NGTD. 12/12 sputum with gram negative rods now speciated to Stenotrophomonas   -will D/C meropenem (12/11-12/16)   - ID following  -c/w imipenem (12/16- ) 250mg q12h x 2 weeks for cavitation and MSSA, may need to adjust dose if not requiring intermittent HD      #Heme  Hx DVT on xarelto. Anemic 12/13 with no evidence of bleeding s/p 1u PRBC   -Anticoagulation with Heparin gtt   -continue to trend CBC/ PTT     #Endo  - ISS q6hrs  - NPH q6hrs

## 2020-12-17 NOTE — PROGRESS NOTE ADULT - PROBLEM SELECTOR PLAN 1
Pt. with ARTEMIO in the setting of COVID-19. Pt. with likely ATN. Scr was WNL at 0.53 on 11/23/20 and progressively worsened to 3.0 on 11/27. Patient was initiated on CRRT (at [facility]) on 11/27/20 for oliguric ARTEMIO. CRRT was discontinued on 12/6/20 and transitioned to intermittent HD on 12/8/20. Pt. subsequently started on diuretics and remained non-oliguric until 12/11. Pt. however clinically volume overloaded on exam on 12/11/20 and underwent HD with 2L UF. Last HD treatment on 12/14/20 with 1.5L UF. Pt. started on Bumex infusion overnight with good urine output. Continue IV Bumex infusion. Will arrange for HD treatment today with 0UF to optimize for trach/PEG procedure tomorrow. Will assess need for HD daily. Monitor labs and urine output. Avoid any potential nephrotoxins. Dose medications as per eGFR.     If any questions, please feel free to contact me  Hermann Presley   Nephrology Fellow  [phone]  (After 5 pm or on weekends please page the on-call fellow)

## 2020-12-17 NOTE — PROGRESS NOTE ADULT - ASSESSMENT
61 yo woman with AARON, h/o Lockett's procedure, s/p reversal 2011 who was admitted to [facility] 11/17 with covid pneumonia.  She received course of remdesevir and dexamethasone; required intubation 11/23 for acute hypoxemic respiratory failure. Hospital course complicated by Proteus bacteremia, MSSA bacteremia/ pneumonia, and Stenotrophomonas respiratory infection, as well as ATN requiring CVVHD now HD.  CT chest 12/4 new large consolidation with cavitation in right lung.  Patient transferred [facility] 12/10; spiked high fever; antibiotics broadened to vanco x 1 and meropenem 500 mg iv 24h.  Blood culture 11/27 MSSA and proteus; 11/28 MSSA.  Bacteremia cleared.   Blood cultures 12/1, 12/11 no growth.  Sputum culture 12/1 and 12/12 Stenotrophomonas.    Multifocal covid pneumonia with respiratory failure, MSSA bacteremia 11/27 with cavitary pneumonia, Proteus bacteremia 11/27, Stenotrophomonas pneumonia vs colonization, ATN on HD.  CxR 12/16- large right lung opacity.  perhaps aspiration pneumonia superimposed on ARDS due to covid.  Suspect Stenotrophomonas colonizing respiratory tract.  She recently received a 7 day course of levofloxacin 12/- 12/9.      suggest:  c/w  Imipenem 250 mg iv q 12 h while on HD to cover both aspiration pneumonia and MSSA bacteremia.  if renal function improves and HD requirement decreases may increase imipenem 500 mg iv q 12 h  duration antibiotics 12/28       Mila Burgos MD  Pager: [phone]  After 5 PM or weekends please call fellow on call or office [phone]

## 2020-12-17 NOTE — PROGRESS NOTE ADULT - SUBJECTIVE AND OBJECTIVE BOX
Interval Events:   -no acute events overnight   -down on fentanyl    HPI:  Patient is a 59yo F w/ AARON, peritonitis s/p Oral's procedure and ileostomy (reversed 2011), HTN, prior DVT/PE, ?Asthma admitted on 11/18/2020 to [facility] after presenting for  productive cough  w/ SOB x9 days and diarrhea x7 days and fever x1 day. Patient was found to be COVID + on 11/17/2020 and completed remdesivir 11/18-11/22. Patient was intubated for airway protection on 11/23/2020. Patient has required proning (last 12/4/2020). Hospital course c/b by ATN, requiring CVVHD now on intermittent HD, unresponsive to bumex challenge last HD 12/8. Course also c/b Stenotrophomonas (12/11, completed Levaquin x7 days), MSSA/P. mirablis bacteremia (on Cefazolin, potentially due to urine vs line-associated and MSSA in sputum due to PNA), also with lung abscesses on CT chest on 12/5.  Patient currently on Volume AC 30/350/8/35% on Precedex. Patient intermittently opens eyes but is not yet responsive. Transferred over to [facility] on 12/10 to offload [facility] COVID ICU    (10 Dec 2020 14:13)      REVIEW OF SYSTEMS:    [x ] Unable to assess ROS because patient is intubated/sedated    OBJECTIVE:  ICU Vital Signs Last 24 Hrs  T(C): 36.4 (17 Dec 2020 00:00), Max: 37.2 (16 Dec 2020 20:00)  T(F): 97.5 (17 Dec 2020 00:00), Max: 99 (16 Dec 2020 20:00)  HR: 59 (17 Dec 2020 11:35) (58 - 87)  BP: --  BP(mean): --  ABP: 169/59 (17 Dec 2020 09:00) (106/43 - 172/59)  ABP(mean): 96 (17 Dec 2020 09:00) (65 - 97)  RR: 16 (17 Dec 2020 09:00) (16 - 33)  SpO2: 97% (17 Dec 2020 11:35) (92% - 100%)    Mode: AC/ CMV (Assist Control/ Continuous Mandatory Ventilation), RR (machine): 32, TV (machine): 370, FiO2: 40, PEEP: 8, MAP: 25, PIP: 56    12-16 @ 07:01  -  12-17 @ 07:00  --------------------------------------------------------  IN: 2599.4 mL / OUT: 1031 mL / NET: 1568.4 mL      CAPILLARY BLOOD GLUCOSE      POCT Blood Glucose.: 154 mg/dL (17 Dec 2020 06:45)    Physical Exam:   Constitutional: ill appearing, no acute distress   HEENT: + PERRLA, EOMI, no drainage or redness  Neck: supple,  No JVD  Respiratory: Ventilator assisted breath rhonchi bilaterally to auscultation, no accessory muscle use noted  Cardiovascular: Regular rate, regular rhythm, normal S1, S2; no murmurs or rub  Gastrointestinal: obese, soft, non-tender, non distended, no hepatosplenomegaly, normal bowel sounds  Extremities: + 2 peripheral edema, no cyanosis, no clubbing   Vascular: Equal and normal pulses: 2+ peripheral pulses throughout  Neurological: sedated/intubated  Skin: DTI to chin area, warm, dry, well perfused, no rashes      HOSPITAL MEDICATIONS:  Standing Meds:  buMETAnide Infusion 2 mG/Hr IV Continuous <Continuous>  chlorhexidine 0.12% Liquid 15 milliLiter(s) Oral Mucosa every 12 hours  chlorhexidine 4% Liquid 1 Application(s) Topical <User Schedule>  dexMEDEtomidine Infusion 0.5 MICROgram(s)/kG/Hr IV Continuous <Continuous>  diazepam    Tablet 10 milliGRAM(s) Oral three times a day  fentaNYL   Infusion..... 0.5 MICROgram(s)/kG/Hr IV Continuous <Continuous>  heparin  Infusion. 1200 Unit(s)/Hr IV Continuous <Continuous>  imipenem/cilastatin  IVPB 250 milliGRAM(s) IV Intermittent every 12 hours  insulin lispro (ADMELOG) corrective regimen sliding scale   SubCutaneous every 6 hours  insulin NPH human recombinant 10 Unit(s) SubCutaneous every 6 hours  methadone   Solution 20 milliGRAM(s) Oral three times a day  midodrine 30 milliGRAM(s) Oral every 8 hours  mupirocin 2% Ointment 1 Application(s) Both Nostrils two times a day  norepinephrine Infusion 0.1 MICROgram(s)/kG/Min IV Continuous <Continuous>  pantoprazole  Injectable 40 milliGRAM(s) IV Push daily  polyethylene glycol 3350 17 Gram(s) Oral daily  senna Syrup 15 milliLiter(s) Oral two times a day      PRN Meds:      LABS:                        7.9    8.91  )-----------( 173      ( 17 Dec 2020 03:04 )             26.3     Hgb Trend: 7.9<--, 7.7<--, 8.1<--, 7.8<--, 7.7<--  12-17    132<L>  |  96<L>  |  56<H>  ----------------------------<  170<H>  3.7   |  22  |  3.21<H>    Ca    8.7      17 Dec 2020 03:04  Phos  4.4     12-16  Mg     1.8     12-16    TPro  6.4  /  Alb  2.1<L>  /  TBili  1.1  /  DBili  x   /  AST  32  /  ALT  5   /  AlkPhos  138<H>  12-17    Creatinine Trend: 3.21<--, 3.10<--, 2.65<--, 4.35<--, 3.74<--, 3.46<--  PT/INR - ( 17 Dec 2020 03:05 )   PT: 14.9 sec;   INR: 1.32 ratio         PTT - ( 17 Dec 2020 03:05 )  PTT:73.5 sec    Arterial Blood Gas:  12-17 @ 03:04  7.26/51/75/21/93.7/-3.8  ABG lactate: --  Arterial Blood Gas:  12-16 @ 22:41  7.26/53/119/21/97.8/-3.5  ABG lactate: --  Arterial Blood Gas:  12-16 @ 03:51  7.36/42/75/23/93.8/-1.9  ABG lactate: --        MICROBIOLOGY:     RADIOLOGY:  [ ] Reviewed and interpreted by me

## 2020-12-17 NOTE — PROGRESS NOTE ADULT - SUBJECTIVE AND OBJECTIVE BOX
Follow Up:  covid pneumonia, respiratory failure, MSSA bacteremia, Proteus bacteremia, Stenotrophomonas positive culture, CKD     Inverval History/ROS:  remains intubated, sedated.  pressor requirement decreasing.   for HD today.  urine output+    Allergies  Kiwi (Unknown)  latex (Anaphylaxis)  latex (Unknown)  penicillin (Other)  penicillin (Unknown)  perfume  hives (Other)  potassium acetate (Other)  soap additives hives (Other)        ANTIMICROBIALS:  imipenem/cilastatin  IVPB 250 every 12 hours        OTHER MEDS:  chlorhexidine 0.12% Liquid 15 milliLiter(s) Oral Mucosa every 12 hours  chlorhexidine 4% Liquid 1 Application(s) Topical <User Schedule>  dexMEDEtomidine Infusion 0.5 MICROgram(s)/kG/Hr IV Continuous <Continuous>  diazepam    Tablet 10 milliGRAM(s) Oral three times a day  fentaNYL   Infusion..... 0.5 MICROgram(s)/kG/Hr IV Continuous <Continuous>  heparin  Infusion. 1100 Unit(s)/Hr IV Continuous <Continuous>  insulin lispro (ADMELOG) corrective regimen sliding scale   SubCutaneous every 6 hours  insulin NPH human recombinant 10 Unit(s) SubCutaneous every 6 hours  methadone   Solution 20 milliGRAM(s) Oral three times a day  midodrine 30 milliGRAM(s) Oral every 8 hours  mupirocin 2% Ointment 1 Application(s) Both Nostrils two times a day  norepinephrine Infusion 0.1 MICROgram(s)/kG/Min IV Continuous <Continuous>  pantoprazole  Injectable 40 milliGRAM(s) IV Push daily  polyethylene glycol 3350 17 Gram(s) Oral daily  senna Syrup 15 milliLiter(s) Oral two times a day      Vital Signs Last 24 Hrs  T(F): 96.8 (12-17-20 @ 15:40), Max: 99 (12-16-20 @ 20:00)  HR: 56 (12-17-20 @ 15:40)  BP: --  RR: 28 (12-17-20 @ 15:40)  SpO2: 100% (12-17-20 @ 15:40) (92% - 100%)    PHYSICAL EXAM:  General: intubated, sedated  HEAD/EYES: eschar lower lips, chin decreasing, dry.   rt TREE kramer  ENT: orally intubated, NGT  Cardiovascular: distant  Respiratory: clear ant  GI:   soft, umbilical hernia, multiple well healed scars, dressings groin bilat.  :   negro    Musculoskeletal:  no synovitis  Neurologic: sedated  Skin:  no rash  Psychiatric: unable  Lines: right axillary A line, Rt IJ Shiton, peripheral bilat                          7.9    8.91  )-----------( 173      ( 17 Dec 2020 03:04 )             26.3 12-17    132  |  96  |  56  ----------------------------<  170  3.7   |  22  |  3.21  Ca    8.7      17 Dec 2020 03:04Phos  4.4     12-16Mg     1.8     12-16  TPro  6.4  /  Alb  2.1  /  TBili  1.1  /  DBili  x   /  AST  32  /  ALT  5   /  AlkPhos  138  12-17          MICROBIOLOGY:  v  .Sputum Sputum  12-12-20   Moderate Stenotrophomonas maltophilia  Normal Respiratory Jocelynn absent  --  Stenotrophomonas maltophilia      .Blood Blood-Venous  12-11-20   No growth to date.  --  --      .Urine Catheterized  12-11-20   No growth  --  --      .Urine Catheterized  12-09-20   No growth  --  --      .Blood Blood-Peripheral  12-07-20   No Growth Final  --  --      .Sputum Sputum  12-01-20   Moderate Stenotrophomonas maltophilia  Normal Respiratory Jocelynn present  --  Stenotrophomonas maltophilia      .Blood Blood-Peripheral  12-01-20   No Growth Final  --  --      .Urine Clean Catch (Midstream)  11-30-20   <10,000 CFU/mL Normal Urogenital Jocelynn  --  --      .Blood Blood-Peripheral  11-28-20   Growth in aerobic and anaerobic bottles: Staphylococcus aureus See  previous culture [id number]  --    Growth in aerobic bottle: Gram Positive Cocci in Clusters  Growth in anaerobic bottle: Gram Positive Cocci in Clusters      .Blood Blood-Peripheral  11-27-20   Growth in aerobic and anaerobic bottles: Staphylococcus aureus  Growth in anaerobic bottle: Staphylococcus hominis  Coag Negative Staphylococcus  Single set isolate, possible contaminant. Contact  Microbiology if susceptibility testing clinically  indicated.  ***Blood Panel PCR results on this specimen are available  approximately 3 hours after the Gram stain result.***  Gram stain, PCR, and/or culture results may not always  correspond due to difference in methodologies.  ************************************************************  This PCR assay was performed using [employer].  The following targets are tested for: Enterococcus,  vancomycin resistant enterococci, Listeria monocytogenes,  coagulase negative staphylococci, S. aureus,  methicillin resistant S. aureus, Streptococcus agalactiae  (Group B), S. pneumoniae, S. pyogenes (Group A),  Acinetobacter baumannii, Enterobacter cloacae, E. coli,  Klebsiella oxytoca, K. pneumoniae, Proteus sp.,  Serratia marcescens, Haemophilus influenzae,  Neisseria meningitidis, Pseudomonas aeruginosa, Candida  albicans, C. glabrata, C krusei, C parapsilosis,  C. tropicalis and the KPC resistance gene.  --  Blood Culture PCR  Staphylococcus aureus      .Sputum  11-25-20   Numerous Staphylococcus aureus  Normal Respiratory Jocelynn absent  --  Staphylococcus aureus      .Sputum Sputum  11-24-20   Moderate Staphylococcus aureus  Normal Respiratory Jocelynn absent  --  Staphylococcus aureus      .Blood Blood-Peripheral  11-19-20   No Growth Final  --  --      RADIOLOGY:    rd< from: Xray Chest 1 View- PORTABLE-Urgent (Xray Chest 1 View- PORTABLE-Urgent .) (12.16.20 @ 10:58) >  EXAM:  XR CHEST PORTABLE URGENT 1V        PROCEDURE DATE:  Dec 16 2020         INTERPRETATION:  TIME OF EXAM: December 16, 2020 at 10:57 AM    CLINICAL INFORMATION: ARDS secondary to Covid 19 pneumonia. Tube position.    TECHNIQUE:   Portable chest    INTERPRETATION:    Tip of the endotracheal tube is above the yuniel. Enteric tube with tip in the body of the stomach. Right IJ catheter may be Shiley.    Relatively localized large airspace opacity at the right lung base perhaps aspiration pneumonia superimposed over more generalized ARDS appearance. No pneumothorax or change from the last exam.      COMPARISON:  December 13      IMPRESSION:  1. Endotracheal and enteric tube in addition to right IJ catheter in satisfactory position.  2. No change in right lung base opacity questionable aspiration pneumonia overlying ARDS.              DAVIS VARGAS MD; Attending Radiologist  This document has been electronically signed. Dec 16 2020 11:15AM    < end of copied text >

## 2020-12-17 NOTE — PROGRESS NOTE ADULT - SUBJECTIVE AND OBJECTIVE BOX
[facility] DIVISION OF KIDNEY DISEASES AND HYPERTENSION -- FOLLOW UP NOTE  --------------------------------------------------------------------------------  Chief Complaint: ARTEMIO in the setting of COVID-19, on RRT/dialysis.    24 hour events/subjective: Pt. was seen and examined at bedside today in the CTICU. Pt. transferred from [facility] on 12/10/20. Pt. currently intubated and on IV vasopressor to maintain MAP. Pt. underwent last HD on 12/14/20 with 1.5L UF. Pt. started on Bumex infusion overnight with good urine output. Will arrange for HD with 0UF to optimize for trach and peg tomorrow.    PAST HISTORY  --------------------------------------------------------------------------------  No significant changes to PMH, PSH, FHx, SHx, unless otherwise noted    ALLERGIES & MEDICATIONS  --------------------------------------------------------------------------------  Allergies    Kiwi (Unknown)  latex (Anaphylaxis)  latex (Unknown)  penicillin (Other)  penicillin (Unknown)  perfume  hives (Other)  potassium acetate (Other)  soap additives hives (Other)    Intolerances    Standing Inpatient Medications  buMETAnide Infusion 2 mG/Hr IV Continuous <Continuous>  chlorhexidine 0.12% Liquid 15 milliLiter(s) Oral Mucosa every 12 hours  chlorhexidine 4% Liquid 1 Application(s) Topical <User Schedule>  dexMEDEtomidine Infusion 0.5 MICROgram(s)/kG/Hr IV Continuous <Continuous>  diazepam    Tablet 10 milliGRAM(s) Oral three times a day  fentaNYL   Infusion..... 0.5 MICROgram(s)/kG/Hr IV Continuous <Continuous>  heparin  Infusion. 1100 Unit(s)/Hr IV Continuous <Continuous>  imipenem/cilastatin  IVPB 250 milliGRAM(s) IV Intermittent every 12 hours  insulin lispro (ADMELOG) corrective regimen sliding scale   SubCutaneous every 6 hours  insulin NPH human recombinant 10 Unit(s) SubCutaneous every 6 hours  methadone   Solution 20 milliGRAM(s) Oral three times a day  midodrine 30 milliGRAM(s) Oral every 8 hours  mupirocin 2% Ointment 1 Application(s) Both Nostrils two times a day  norepinephrine Infusion 0.1 MICROgram(s)/kG/Min IV Continuous <Continuous>  pantoprazole  Injectable 40 milliGRAM(s) IV Push daily  polyethylene glycol 3350 17 Gram(s) Oral daily  senna Syrup 15 milliLiter(s) Oral two times a day    REVIEW OF SYSTEMS  --------------------------------------------------------------------------------  Unable to obtain due to medical condition    VITALS/PHYSICAL EXAM  --------------------------------------------------------------------------------  T(C): 36.4 (12-17-20 @ 00:00), Max: 37.2 (12-16-20 @ 20:00)  HR: 61 (12-17-20 @ 07:35) (58 - 87)  BP: --  RR: 31 (12-17-20 @ 07:00) (19 - 33)  SpO2: 96% (12-17-20 @ 07:35) (92% - 100%)  Wt(kg): --    12-16-20 @ 07:01  -  12-17-20 @ 07:00  --------------------------------------------------------  IN: 2599.4 mL / OUT: 1031 mL / NET: 1568.4 mL    Physical Exam: (patient examined by inspection given COVID 19  	Gen: Intubated  	HEENT: ETT +  	Pulm: on vent  	CV: regular rate  	Ext: trace LE edema B/L  	Neuro: sedated  	Vascular access: Right IJ non-tunneled HD catheter site : No bleeding seen              : marky     LABS/STUDIES  --------------------------------------------------------------------------------              7.9    8.91  >-----------<  173      [12-17-20 @ 03:04]              26.3     132  |  96  |  56  ----------------------------<  170      [12-17-20 @ 03:04]  3.7   |  22  |  3.21        Ca     8.7     [12-17-20 @ 03:04]      Mg     1.8     [12-16-20 @ 03:51]      Phos  4.4     [12-16-20 @ 03:51]    Creatinine Trend:  SCr 3.21 [12-17 @ 03:04]  SCr 3.10 [12-16 @ 03:51]  SCr 2.65 [12-15 @ 03:49]  SCr 4.35 [12-14 @ 04:13]  SCr 3.74 [12-13 @ 03:30]    HBsAb <3.0      [12-12-20 @ 12:25]  HBsAg Nonreact      [12-08-20 @ 03:38]  HCV 0.16, Nonreact      [12-12-20 @ 12:25]

## 2020-12-17 NOTE — PROGRESS NOTE ADULT - ATTENDING COMMENTS
61 y/o F with PMH as above presented to the hospital with shortness of breath on 11/18, developed progressive hypoxic respiratory failure requiring intubation on 11/23.  Patient transferred to [facility] on 12/10.  Hospital course c/b lung abscess, MSSA bacteremia, now with ARTEMIO requiring HD, plan for a session today.  Patient with Stenotrophomonas in the sputum, likely a colonizer and she completed a course of abx, cont imipenem for aspiration PNA and MSSA bacteremia, cultures cleared.  Plan for trach/PEG tomorrow with CTS. Prognosis guarded.

## 2020-12-17 NOTE — CHART NOTE - NSCHARTNOTEFT_GEN_A_CORE
Pt for Trach/PeG tomorrow with Dr. Brock  Please make NPO after 12mn  Send Urine for Preg and Type and Screen.   Please hold Heparin gtt at 4am on 12/18  Above d/w Dr. Brock and ICU team.

## 2020-12-18 ENCOUNTER — APPOINTMENT (OUTPATIENT)
Dept: THORACIC SURGERY | Facility: HOSPITAL | Age: 60
End: 2020-12-18

## 2020-12-18 DIAGNOSIS — E87.6 HYPOKALEMIA: ICD-10-CM

## 2020-12-18 DIAGNOSIS — J96.90 RESPIRATORY FAILURE, UNSPECIFIED, UNSPECIFIED WHETHER WITH HYPOXIA OR HYPERCAPNIA: ICD-10-CM

## 2020-12-18 LAB
ALBUMIN SERPL ELPH-MCNC: 1.8 G/DL — LOW (ref 3.3–5)
ALBUMIN SERPL ELPH-MCNC: 1.9 G/DL — LOW (ref 3.3–5)
ALP SERPL-CCNC: 125 U/L — HIGH (ref 40–120)
ALP SERPL-CCNC: 136 U/L — HIGH (ref 40–120)
ALT FLD-CCNC: <5 U/L — LOW (ref 4–33)
ALT FLD-CCNC: <5 U/L — SIGNIFICANT CHANGE UP (ref 4–33)
ANION GAP SERPL CALC-SCNC: 11 MMOL/L — SIGNIFICANT CHANGE UP (ref 7–14)
ANION GAP SERPL CALC-SCNC: 13 MMOL/L — SIGNIFICANT CHANGE UP (ref 7–14)
APTT BLD: 85 SEC — HIGH (ref 27–36.3)
AST SERPL-CCNC: 22 U/L — SIGNIFICANT CHANGE UP (ref 4–32)
AST SERPL-CCNC: 22 U/L — SIGNIFICANT CHANGE UP (ref 4–32)
BILIRUB SERPL-MCNC: 0.9 MG/DL — SIGNIFICANT CHANGE UP (ref 0.2–1.2)
BILIRUB SERPL-MCNC: 1.1 MG/DL — SIGNIFICANT CHANGE UP (ref 0.2–1.2)
BLD GP AB SCN SERPL QL: NEGATIVE — SIGNIFICANT CHANGE UP
BLOOD GAS ARTERIAL COMPREHENSIVE RESULT: SIGNIFICANT CHANGE UP
BLOOD GAS ARTERIAL COMPREHENSIVE RESULT: SIGNIFICANT CHANGE UP
BUN SERPL-MCNC: 39 MG/DL — HIGH (ref 7–23)
BUN SERPL-MCNC: 41 MG/DL — HIGH (ref 7–23)
CALCIUM SERPL-MCNC: 8.1 MG/DL — LOW (ref 8.4–10.5)
CALCIUM SERPL-MCNC: 8.4 MG/DL — SIGNIFICANT CHANGE UP (ref 8.4–10.5)
CHLORIDE SERPL-SCNC: 95 MMOL/L — LOW (ref 98–107)
CHLORIDE SERPL-SCNC: 95 MMOL/L — LOW (ref 98–107)
CO2 SERPL-SCNC: 22 MMOL/L — SIGNIFICANT CHANGE UP (ref 22–31)
CO2 SERPL-SCNC: 25 MMOL/L — SIGNIFICANT CHANGE UP (ref 22–31)
CREAT SERPL-MCNC: 1.96 MG/DL — HIGH (ref 0.5–1.3)
CREAT SERPL-MCNC: 1.97 MG/DL — HIGH (ref 0.5–1.3)
GLUCOSE BLDC GLUCOMTR-MCNC: 117 MG/DL — HIGH (ref 70–99)
GLUCOSE BLDC GLUCOMTR-MCNC: 179 MG/DL — HIGH (ref 70–99)
GLUCOSE SERPL-MCNC: 125 MG/DL — HIGH (ref 70–99)
GLUCOSE SERPL-MCNC: 144 MG/DL — HIGH (ref 70–99)
HCT VFR BLD CALC: 27.5 % — LOW (ref 34.5–45)
HGB BLD-MCNC: 8.2 G/DL — LOW (ref 11.5–15.5)
INR BLD: 1.32 RATIO — HIGH (ref 0.88–1.17)
MAGNESIUM SERPL-MCNC: 1.4 MG/DL — LOW (ref 1.6–2.6)
MCHC RBC-ENTMCNC: 28.3 PG — SIGNIFICANT CHANGE UP (ref 27–34)
MCHC RBC-ENTMCNC: 29.8 GM/DL — LOW (ref 32–36)
MCV RBC AUTO: 94.8 FL — SIGNIFICANT CHANGE UP (ref 80–100)
NRBC # BLD: 0 /100 WBCS — SIGNIFICANT CHANGE UP
NRBC # FLD: 0.06 K/UL — HIGH
PHOSPHATE SERPL-MCNC: 3.3 MG/DL — SIGNIFICANT CHANGE UP (ref 2.5–4.5)
PLATELET # BLD AUTO: 142 K/UL — LOW (ref 150–400)
POTASSIUM SERPL-MCNC: 2.9 MMOL/L — CRITICAL LOW (ref 3.5–5.3)
POTASSIUM SERPL-MCNC: 3 MMOL/L — LOW (ref 3.5–5.3)
POTASSIUM SERPL-SCNC: 2.9 MMOL/L — CRITICAL LOW (ref 3.5–5.3)
POTASSIUM SERPL-SCNC: 3 MMOL/L — LOW (ref 3.5–5.3)
PROT SERPL-MCNC: 5.6 G/DL — LOW (ref 6–8.3)
PROT SERPL-MCNC: 6 G/DL — SIGNIFICANT CHANGE UP (ref 6–8.3)
PROTHROM AB SERPL-ACNC: 15 SEC — HIGH (ref 9.8–13.1)
RBC # BLD: 2.9 M/UL — LOW (ref 3.8–5.2)
RBC # FLD: 22.2 % — HIGH (ref 10.3–14.5)
RH IG SCN BLD-IMP: POSITIVE — SIGNIFICANT CHANGE UP
SODIUM SERPL-SCNC: 130 MMOL/L — LOW (ref 135–145)
SODIUM SERPL-SCNC: 131 MMOL/L — LOW (ref 135–145)
WBC # BLD: 8.82 K/UL — SIGNIFICANT CHANGE UP (ref 3.8–10.5)
WBC # FLD AUTO: 8.82 K/UL — SIGNIFICANT CHANGE UP (ref 3.8–10.5)

## 2020-12-18 PROCEDURE — 31624 DX BRONCHOSCOPE/LAVAGE: CPT | Mod: 59

## 2020-12-18 PROCEDURE — 99291 CRITICAL CARE FIRST HOUR: CPT

## 2020-12-18 PROCEDURE — 99233 SBSQ HOSP IP/OBS HIGH 50: CPT

## 2020-12-18 PROCEDURE — 71045 X-RAY EXAM CHEST 1 VIEW: CPT | Mod: 26,59

## 2020-12-18 PROCEDURE — 31600 PLANNED TRACHEOSTOMY: CPT

## 2020-12-18 PROCEDURE — 31645 BRNCHSC W/THER ASPIR 1ST: CPT | Mod: 59

## 2020-12-18 PROCEDURE — 31615 TRCHEOBRNCHSC EST TRACHS INC: CPT

## 2020-12-18 RX ORDER — POTASSIUM CHLORIDE 20 MEQ
10 PACKET (EA) ORAL
Refills: 0 | Status: COMPLETED | OUTPATIENT
Start: 2020-12-18 | End: 2020-12-18

## 2020-12-18 RX ORDER — MAGNESIUM SULFATE 500 MG/ML
1 VIAL (ML) INJECTION ONCE
Refills: 0 | Status: COMPLETED | OUTPATIENT
Start: 2020-12-18 | End: 2020-12-18

## 2020-12-18 RX ORDER — HYDROMORPHONE HYDROCHLORIDE 2 MG/ML
2 INJECTION INTRAMUSCULAR; INTRAVENOUS; SUBCUTANEOUS ONCE
Refills: 0 | Status: DISCONTINUED | OUTPATIENT
Start: 2020-12-18 | End: 2020-12-18

## 2020-12-18 RX ORDER — POTASSIUM CHLORIDE 20 MEQ
40 PACKET (EA) ORAL ONCE
Refills: 0 | Status: COMPLETED | OUTPATIENT
Start: 2020-12-18 | End: 2020-12-18

## 2020-12-18 RX ORDER — FENTANYL CITRATE 50 UG/ML
0.5 INJECTION INTRAVENOUS
Qty: 2500 | Refills: 0 | Status: DISCONTINUED | OUTPATIENT
Start: 2020-12-18 | End: 2020-12-18

## 2020-12-18 RX ORDER — MAGNESIUM SULFATE 500 MG/ML
2 VIAL (ML) INJECTION ONCE
Refills: 0 | Status: COMPLETED | OUTPATIENT
Start: 2020-12-18 | End: 2020-12-18

## 2020-12-18 RX ADMIN — CHLORHEXIDINE GLUCONATE 15 MILLILITER(S): 213 SOLUTION TOPICAL at 18:53

## 2020-12-18 RX ADMIN — Medication 2: at 06:29

## 2020-12-18 RX ADMIN — CHLORHEXIDINE GLUCONATE 15 MILLILITER(S): 213 SOLUTION TOPICAL at 05:15

## 2020-12-18 RX ADMIN — DEXMEDETOMIDINE HYDROCHLORIDE IN 0.9% SODIUM CHLORIDE 14.4 MICROGRAM(S)/KG/HR: 4 INJECTION INTRAVENOUS at 20:10

## 2020-12-18 RX ADMIN — FENTANYL CITRATE 1.15 MICROGRAM(S)/KG/HR: 50 INJECTION INTRAVENOUS at 20:19

## 2020-12-18 RX ADMIN — MIDODRINE HYDROCHLORIDE 30 MILLIGRAM(S): 2.5 TABLET ORAL at 23:41

## 2020-12-18 RX ADMIN — Medication 10 MILLIGRAM(S): at 23:41

## 2020-12-18 RX ADMIN — HYDROMORPHONE HYDROCHLORIDE 2 MILLIGRAM(S): 2 INJECTION INTRAMUSCULAR; INTRAVENOUS; SUBCUTANEOUS at 19:08

## 2020-12-18 RX ADMIN — METHADONE HYDROCHLORIDE 20 MILLIGRAM(S): 40 TABLET ORAL at 04:27

## 2020-12-18 RX ADMIN — Medication 100 MILLIEQUIVALENT(S): at 20:08

## 2020-12-18 RX ADMIN — IMIPENEM AND CILASTATIN 200 MILLIGRAM(S): 250; 250 INJECTION, POWDER, FOR SOLUTION INTRAVENOUS at 05:00

## 2020-12-18 RX ADMIN — Medication 100 GRAM(S): at 04:25

## 2020-12-18 RX ADMIN — PANTOPRAZOLE SODIUM 40 MILLIGRAM(S): 20 TABLET, DELAYED RELEASE ORAL at 11:42

## 2020-12-18 RX ADMIN — MUPIROCIN 1 APPLICATION(S): 20 OINTMENT TOPICAL at 18:53

## 2020-12-18 RX ADMIN — MUPIROCIN 1 APPLICATION(S): 20 OINTMENT TOPICAL at 06:00

## 2020-12-18 RX ADMIN — Medication 50 GRAM(S): at 11:39

## 2020-12-18 RX ADMIN — HYDROMORPHONE HYDROCHLORIDE 2 MILLIGRAM(S): 2 INJECTION INTRAMUSCULAR; INTRAVENOUS; SUBCUTANEOUS at 19:04

## 2020-12-18 RX ADMIN — FENTANYL CITRATE 1.15 MICROGRAM(S)/KG/HR: 50 INJECTION INTRAVENOUS at 12:04

## 2020-12-18 RX ADMIN — METHADONE HYDROCHLORIDE 20 MILLIGRAM(S): 40 TABLET ORAL at 23:40

## 2020-12-18 RX ADMIN — Medication 100 MILLIEQUIVALENT(S): at 14:30

## 2020-12-18 RX ADMIN — Medication 10 MILLIGRAM(S): at 05:15

## 2020-12-18 RX ADMIN — CHLORHEXIDINE GLUCONATE 1 APPLICATION(S): 213 SOLUTION TOPICAL at 09:00

## 2020-12-18 RX ADMIN — Medication 40 MILLIEQUIVALENT(S): at 06:28

## 2020-12-18 RX ADMIN — HUMAN INSULIN 10 UNIT(S): 100 INJECTION, SUSPENSION SUBCUTANEOUS at 23:41

## 2020-12-18 RX ADMIN — Medication 100 MILLIEQUIVALENT(S): at 21:34

## 2020-12-18 NOTE — PROGRESS NOTE ADULT - SUBJECTIVE AND OBJECTIVE BOX
[facility] DIVISION OF KIDNEY DISEASES AND HYPERTENSION -- FOLLOW UP NOTE  --------------------------------------------------------------------------------  Chief Complaint: ARTEMIO in the setting of COVID-19, on RRT/dialysis.    24 hour events/subjective: Pt. was seen and examined at bedside today in the CTICU. Pt. transferred from [facility] on 12/10/20. Pt. currently intubated and on IV vasopressor to maintain MAP. Pt. underwent last HD on 12/17/20 with 0 UF to optimize for procedure today. Pt. on Bumex infusion with good urine output.     PAST HISTORY  --------------------------------------------------------------------------------  No significant changes to PMH, PSH, FHx, SHx, unless otherwise noted    ALLERGIES & MEDICATIONS  --------------------------------------------------------------------------------  Allergies    Kiwi (Unknown)  latex (Anaphylaxis)  latex (Unknown)  penicillin (Other)  penicillin (Unknown)  perfume  hives (Other)  potassium acetate (Other)  soap additives hives (Other)    Intolerances      Standing Inpatient Medications  chlorhexidine 0.12% Liquid 15 milliLiter(s) Oral Mucosa every 12 hours  chlorhexidine 4% Liquid 1 Application(s) Topical <User Schedule>  dexMEDEtomidine Infusion 0.5 MICROgram(s)/kG/Hr IV Continuous <Continuous>  diazepam    Tablet 10 milliGRAM(s) Oral three times a day  fentaNYL   Infusion..... 0.5 MICROgram(s)/kG/Hr IV Continuous <Continuous>  heparin  Infusion 1700 Unit(s)/Hr IV Continuous <Continuous>  imipenem/cilastatin  IVPB 250 milliGRAM(s) IV Intermittent every 12 hours  insulin lispro (ADMELOG) corrective regimen sliding scale   SubCutaneous every 6 hours  insulin NPH human recombinant 10 Unit(s) SubCutaneous every 6 hours  magnesium sulfate  IVPB 2 Gram(s) IV Intermittent once  methadone   Solution 20 milliGRAM(s) Oral three times a day  midodrine 30 milliGRAM(s) Oral every 8 hours  mupirocin 2% Ointment 1 Application(s) Both Nostrils two times a day  norepinephrine Infusion 0.1 MICROgram(s)/kG/Min IV Continuous <Continuous>  pantoprazole  Injectable 40 milliGRAM(s) IV Push daily  polyethylene glycol 3350 17 Gram(s) Oral daily  senna Syrup 15 milliLiter(s) Oral two times a day    REVIEW OF SYSTEMS  --------------------------------------------------------------------------------  Unable to obtain due to medical condition    VITALS/PHYSICAL EXAM  --------------------------------------------------------------------------------  T(C): 36.2 (12-18-20 @ 07:00), Max: 36.3 (12-17-20 @ 13:55)  HR: 55 (12-18-20 @ 09:00) (52 - 101)  BP: --  RR: 22 (12-18-20 @ 09:00) (13 - 32)  SpO2: 96% (12-18-20 @ 09:00) (91% - 100%)  Wt(kg): --    12-17-20 @ 07:01  -  12-18-20 @ 07:00  --------------------------------------------------------  IN: 2829.2 mL / OUT: 2810 mL / NET: 19.2 mL    Physical Exam:  	(patient examined by inspection given COVID 19  	Gen: Intubated  	HEENT: ETT +  	Pulm: on vent  	CV: regular rate  	Ext: trace LE edema B/L  	Neuro: sedated  	Vascular access: Right IJ non-tunneled HD catheter site : No bleeding seen              : marky     LABS/STUDIES  --------------------------------------------------------------------------------              8.2    8.82  >-----------<  142      [12-18-20 @ 02:43]              27.5     130  |  95  |  39  ----------------------------<  125      [12-18-20 @ 02:43]  2.9   |  22  |  1.97        Ca     8.4     [12-18-20 @ 02:43]      Mg     1.4     [12-18-20 @ 02:43]      Phos  3.3     [12-18-20 @ 02:43]    Creatinine Trend:  SCr 1.97 [12-18 @ 02:43]  SCr 3.21 [12-17 @ 03:04]  SCr 3.10 [12-16 @ 03:51]  SCr 2.65 [12-15 @ 03:49]  SCr 4.35 [12-14 @ 04:13]

## 2020-12-18 NOTE — PROGRESS NOTE ADULT - ATTENDING COMMENTS
61 y/o F with PMH as above presented to the hospital with shortness of breath on 11/18, developed progressive hypoxic respiratory failure requiring intubation on 11/23.  Patient transferred to [facility] on 12/10.  Hospital course c/b lung abscess, MSSA bacteremia, now with ARTEMIO requiring HD, s/p HD session yesterday with 1.7L fluid removal.  Patient with Stenotrophomonas in the sputum, likely a colonizer and she completed a course of abx, cont imipenem for aspiration PNA and MSSA bacteremia, cultures cleared.  Plan for trach today with CTS. Prognosis guarded.

## 2020-12-18 NOTE — CHART NOTE - NSCHARTNOTEFT_GEN_A_CORE
Patient s/p trach placement.  Trach site clean and dry, no signs of bleeding noted.  ICU Vital Signs Last 24 Hrs  T(C): 36.2 (18 Dec 2020 07:00), Max: 36.2 (18 Dec 2020 07:00)  T(F): 97.1 (18 Dec 2020 07:00), Max: 97.1 (18 Dec 2020 07:00)  HR: 124 (18 Dec 2020 19:39) (54 - 124)  BP: --  BP(mean): --  ABP: 154/72 (18 Dec 2020 17:00) (123/74 - 166/92)  ABP(mean): 106 (18 Dec 2020 17:00) (79 - 122)  RR: 32 (18 Dec 2020 17:00) (7 - 32)  SpO2: 100% (18 Dec 2020 19:39) (94% - 100%)    I&O's Detail    17 Dec 2020 07:01  -  18 Dec 2020 07:00  --------------------------------------------------------  IN:    Bumetanide: 240 mL    Dexmedetomidine: 790.5 mL    Enteral Tube Flush: 80 mL    FentaNYL: 146.9 mL    FentaNYL: 41.3 mL    Heparin: 170 mL    Heparin Infusion: 170 mL    IV PiggyBack: 200 mL    Nepro: 480 mL    Norepinephrine: 10.5 mL    Other (mL): 500 mL  Total IN: 2829.2 mL    OUT:    Indwelling Catheter - Urethral (mL): 2310 mL    Other (mL): 500 mL  Total OUT: 2810 mL    Total NET: 19.2 mL      18 Dec 2020 07:01  -  18 Dec 2020 21:41  --------------------------------------------------------  IN:    Dexmedetomidine: 1.2 mL    IV PiggyBack: 50 mL  Total IN: 51.2 mL    OUT:    Indwelling Catheter - Urethral (mL): 650 mL  Total OUT: 650 mL    Total NET: -598.8 mL      MEDICATIONS  (STANDING):  chlorhexidine 0.12% Liquid 15 milliLiter(s) Oral Mucosa every 12 hours  chlorhexidine 4% Liquid 1 Application(s) Topical <User Schedule>  dexMEDEtomidine Infusion 0.5 MICROgram(s)/kG/Hr (14.4 mL/Hr) IV Continuous <Continuous>  diazepam    Tablet 10 milliGRAM(s) Oral three times a day  fentaNYL   Infusion..... 0.5 MICROgram(s)/kG/Hr (1.15 mL/Hr) IV Continuous <Continuous>  heparin  Infusion 1700 Unit(s)/Hr (17 mL/Hr) IV Continuous <Continuous>  imipenem/cilastatin  IVPB 250 milliGRAM(s) IV Intermittent every 12 hours  insulin lispro (ADMELOG) corrective regimen sliding scale   SubCutaneous every 6 hours  insulin NPH human recombinant 10 Unit(s) SubCutaneous every 6 hours  methadone   Solution 20 milliGRAM(s) Oral three times a day  midodrine 30 milliGRAM(s) Oral every 8 hours  mupirocin 2% Ointment 1 Application(s) Both Nostrils two times a day  norepinephrine Infusion 0.1 MICROgram(s)/kG/Min (21.6 mL/Hr) IV Continuous <Continuous>  pantoprazole  Injectable 40 milliGRAM(s) IV Push daily  polyethylene glycol 3350 17 Gram(s) Oral daily  senna Syrup 15 milliLiter(s) Oral two times a day    A/P: S/P Trach placement  Will remove trach sutures on 1/1/21  Trach care as needed   May restart heparin at midnight   Continue care as per primary team

## 2020-12-18 NOTE — PROGRESS NOTE ADULT - PROBLEM SELECTOR PLAN 1
Pt. with ARTEMIO in the setting of COVID-19. Pt. with likely ATN. Scr was WNL at 0.53 on 11/23/20 and progressively worsened to 3.0 on 11/27. Patient was initiated on CRRT (at [facility]) on 11/27/20 for oliguric ARTEMIO. CRRT was discontinued on 12/6/20 and transitioned to intermittent HD on 12/8/20. Pt. subsequently started on diuretics and remained non-oliguric until 12/11. Pt. however clinically volume overloaded and restarted on HD. Last HD treatment on 12/17/20 0L UF. Pt. started on Bumex infusion with good urine output. Stop IV Bumex infusion for now. No plans for HD as per discussion with ICU team. Use Bumex PRN post procedure to maintain euvolemia. Will assess need for HD daily. Monitor labs and urine output. Avoid any potential nephrotoxins. Dose medications as per eGFR.     If any questions, please feel free to contact me  Hermann Presley   Nephrology Fellow  [phone]  (After 5 pm or on weekends please page the on-call fellow)

## 2020-12-18 NOTE — PROGRESS NOTE ADULT - ASSESSMENT
This is a 60 year old Female w/ pmh of HTN, DVT on Xarelto, peritonitis s/p Oral's procedure and ileostomy (reversed 2011), AARON who presented to [facility] on 11/18  w/ fevers found to have COVID-19, intubated 11/23, course complicated by superimposed PNA and bacteremia with Stenotrophomonas (12/11, completed Levaquin x7 days), MSSA/P. mirablis bacteremia (on Cefazolin, potentially due to urine vs line-associated and MSSA in sputum due to PNA), also with lung abscesses on CT chest on 12/5 , ARTEMIO/ATN requiring CRRT and HD. Now unable to wean from ventilator and transferred from [facility] on 12/10 to offload COVID unit. Unable to wean from ventilator, plan for trach/PEG with thoracic surgery on friday        #Neuro  Alert & oriented x3 @baseline.   - remains on Precedex and fentanyl for sedation  -will continue to come down on fentanyl if tolerated and c/w methadone and valium will increase if needed   - Pt current mental status most likely encephalopathic from renal failure and uremia  -CTH negative   - last ammonia level was 66     #Resp  Acute hypoxemic respiratory failure 2/2 covid-19 PNA. Intubated on 11/23, now unable to wean from ventilator   - c/w AC 30/370/8/40%   -plan for tracheostomy today by CT surg    #CV  Hx of HTN, DVT on Xarelto now in shock state requiring pressors most likely vasoplegic from sedatives   - Continue Midodrine at 30 TID  -c/w norepinephrine gtt and titrate to maintain MAP>65  - Held heparin gtt fro bedside trach, will restart post procedure  - CT negative for PE    #GI  - Continue TF, tolerating well  - Continue bowel regimen  -c/w PPI     #  ARTEMIO 2/2 ATN requiring CRRT, then Bumex challenge and now intermittent HD, finished trial with Bumex yesterday with okay response. Started on Bumex gtt now making good UO. Bumex gtt d/c'ed  -last HD 12/17, Bumex gtt d/c'ed  -renal following   - will trend electrolytes and replete as necessary  - Rivera in place  -strict I’s and O’s    #ID  Covid 19 pneumonia, MSSA bacteremia/pneumonia, now cavitary consolidation on CT chest.  - s/p remdesivir and dexamethasone   - + Sputum/blood MSSA, + P mirablis in blood, +Stenotrophomonas in sputum   - Was previously on Cefazolin for MSSA bacteremia. Pt continues with fevers, ABX broaden to Fadia and Vanco on 12/11  - s/p Levofloxacin   - caspofungin discontinued, no fungal growth.   - Blood cx from 12/7 NGTD. 12/12 sputum with gram negative rods now speciated to Stenotrophomonas   -will D/C meropenem (12/11-12/16)   - ID following  -c/w imipenem (12/16- ) 250mg q12h x 2 weeks for cavitation and MSSA, may need to adjust dose if not requiring intermittent HD      #Heme  Hx DVT on xarelto. Anemic 12/13 with no evidence of bleeding s/p 1u PRBC   -Anticoagulation with Heparin gtt held for trach  -continue to trend CBC/ PTT     #Endo  - ISS q6hrs  - NPH q6hrs

## 2020-12-19 LAB
ALBUMIN SERPL ELPH-MCNC: 1.9 G/DL — LOW (ref 3.3–5)
ALP SERPL-CCNC: 116 U/L — SIGNIFICANT CHANGE UP (ref 40–120)
ALT FLD-CCNC: <5 U/L — LOW (ref 4–33)
ANION GAP SERPL CALC-SCNC: 13 MMOL/L — SIGNIFICANT CHANGE UP (ref 7–14)
APTT BLD: 146.5 SEC — CRITICAL HIGH (ref 27–36.3)
APTT BLD: 54.5 SEC — HIGH (ref 27–36.3)
APTT BLD: 73.6 SEC — HIGH (ref 27–36.3)
AST SERPL-CCNC: 15 U/L — SIGNIFICANT CHANGE UP (ref 4–32)
BASOPHILS # BLD AUTO: 0.09 K/UL — SIGNIFICANT CHANGE UP (ref 0–0.2)
BASOPHILS NFR BLD AUTO: 0.8 % — SIGNIFICANT CHANGE UP (ref 0–2)
BILIRUB SERPL-MCNC: 0.8 MG/DL — SIGNIFICANT CHANGE UP (ref 0.2–1.2)
BLOOD GAS ARTERIAL COMPREHENSIVE RESULT: SIGNIFICANT CHANGE UP
BUN SERPL-MCNC: 44 MG/DL — HIGH (ref 7–23)
CALCIUM SERPL-MCNC: 8.4 MG/DL — SIGNIFICANT CHANGE UP (ref 8.4–10.5)
CHLORIDE SERPL-SCNC: 94 MMOL/L — LOW (ref 98–107)
CO2 SERPL-SCNC: 25 MMOL/L — SIGNIFICANT CHANGE UP (ref 22–31)
CREAT SERPL-MCNC: 1.71 MG/DL — HIGH (ref 0.5–1.3)
EOSINOPHIL # BLD AUTO: 0.18 K/UL — SIGNIFICANT CHANGE UP (ref 0–0.5)
EOSINOPHIL NFR BLD AUTO: 1.6 % — SIGNIFICANT CHANGE UP (ref 0–6)
GLUCOSE BLDC GLUCOMTR-MCNC: 104 MG/DL — HIGH (ref 70–99)
GLUCOSE BLDC GLUCOMTR-MCNC: 117 MG/DL — HIGH (ref 70–99)
GLUCOSE BLDC GLUCOMTR-MCNC: 118 MG/DL — HIGH (ref 70–99)
GLUCOSE BLDC GLUCOMTR-MCNC: 118 MG/DL — HIGH (ref 70–99)
GLUCOSE BLDC GLUCOMTR-MCNC: 81 MG/DL — SIGNIFICANT CHANGE UP (ref 70–99)
GLUCOSE SERPL-MCNC: 104 MG/DL — HIGH (ref 70–99)
HCT VFR BLD CALC: 25.7 % — LOW (ref 34.5–45)
HCT VFR BLD CALC: 25.8 % — LOW (ref 34.5–45)
HGB BLD-MCNC: 7.4 G/DL — LOW (ref 11.5–15.5)
HGB BLD-MCNC: 7.8 G/DL — LOW (ref 11.5–15.5)
IANC: 7.99 K/UL — SIGNIFICANT CHANGE UP (ref 1.5–8.5)
IMM GRANULOCYTES NFR BLD AUTO: 9.6 % — HIGH (ref 0–1.5)
INR BLD: 1.41 RATIO — HIGH (ref 0.88–1.17)
LYMPHOCYTES # BLD AUTO: 1.43 K/UL — SIGNIFICANT CHANGE UP (ref 1–3.3)
LYMPHOCYTES # BLD AUTO: 12.4 % — LOW (ref 13–44)
MCHC RBC-ENTMCNC: 28.1 PG — SIGNIFICANT CHANGE UP (ref 27–34)
MCHC RBC-ENTMCNC: 28.5 PG — SIGNIFICANT CHANGE UP (ref 27–34)
MCHC RBC-ENTMCNC: 28.8 GM/DL — LOW (ref 32–36)
MCHC RBC-ENTMCNC: 30.2 GM/DL — LOW (ref 32–36)
MCV RBC AUTO: 94.2 FL — SIGNIFICANT CHANGE UP (ref 80–100)
MCV RBC AUTO: 97.7 FL — SIGNIFICANT CHANGE UP (ref 80–100)
MONOCYTES # BLD AUTO: 0.72 K/UL — SIGNIFICANT CHANGE UP (ref 0–0.9)
MONOCYTES NFR BLD AUTO: 6.3 % — SIGNIFICANT CHANGE UP (ref 2–14)
NEUTROPHILS # BLD AUTO: 7.99 K/UL — HIGH (ref 1.8–7.4)
NEUTROPHILS NFR BLD AUTO: 69.3 % — SIGNIFICANT CHANGE UP (ref 43–77)
NRBC # BLD: 2 /100 WBCS — SIGNIFICANT CHANGE UP
NRBC # BLD: 2 /100 WBCS — SIGNIFICANT CHANGE UP
NRBC # FLD: 0.2 K/UL — HIGH
NRBC # FLD: 0.22 K/UL — HIGH
PLATELET # BLD AUTO: 162 K/UL — SIGNIFICANT CHANGE UP (ref 150–400)
PLATELET # BLD AUTO: 191 K/UL — SIGNIFICANT CHANGE UP (ref 150–400)
POTASSIUM SERPL-MCNC: 3.3 MMOL/L — LOW (ref 3.5–5.3)
POTASSIUM SERPL-SCNC: 3.3 MMOL/L — LOW (ref 3.5–5.3)
PROT SERPL-MCNC: 5.7 G/DL — LOW (ref 6–8.3)
PROTHROM AB SERPL-ACNC: 15.8 SEC — HIGH (ref 9.8–13.1)
RBC # BLD: 2.63 M/UL — LOW (ref 3.8–5.2)
RBC # BLD: 2.74 M/UL — LOW (ref 3.8–5.2)
RBC # FLD: 22.5 % — HIGH (ref 10.3–14.5)
RBC # FLD: 22.6 % — HIGH (ref 10.3–14.5)
SODIUM SERPL-SCNC: 132 MMOL/L — LOW (ref 135–145)
WBC # BLD: 11.51 K/UL — HIGH (ref 3.8–10.5)
WBC # BLD: 12.27 K/UL — HIGH (ref 3.8–10.5)
WBC # FLD AUTO: 11.51 K/UL — HIGH (ref 3.8–10.5)
WBC # FLD AUTO: 12.27 K/UL — HIGH (ref 3.8–10.5)

## 2020-12-19 PROCEDURE — 99291 CRITICAL CARE FIRST HOUR: CPT

## 2020-12-19 PROCEDURE — 71045 X-RAY EXAM CHEST 1 VIEW: CPT | Mod: 26,59

## 2020-12-19 PROCEDURE — 99232 SBSQ HOSP IP/OBS MODERATE 35: CPT

## 2020-12-19 PROCEDURE — 99232 SBSQ HOSP IP/OBS MODERATE 35: CPT | Mod: GC

## 2020-12-19 RX ORDER — MIDODRINE HYDROCHLORIDE 2.5 MG/1
20 TABLET ORAL EVERY 8 HOURS
Refills: 0 | Status: DISCONTINUED | OUTPATIENT
Start: 2020-12-19 | End: 2020-12-20

## 2020-12-19 RX ORDER — METHADONE HYDROCHLORIDE 40 MG/1
30 TABLET ORAL THREE TIMES A DAY
Refills: 0 | Status: DISCONTINUED | OUTPATIENT
Start: 2020-12-19 | End: 2020-12-23

## 2020-12-19 RX ORDER — DIAZEPAM 5 MG
20 TABLET ORAL THREE TIMES A DAY
Refills: 0 | Status: DISCONTINUED | OUTPATIENT
Start: 2020-12-19 | End: 2020-12-22

## 2020-12-19 RX ORDER — NOREPINEPHRINE BITARTRATE/D5W 8 MG/250ML
0.05 PLASTIC BAG, INJECTION (ML) INTRAVENOUS
Qty: 8 | Refills: 0 | Status: DISCONTINUED | OUTPATIENT
Start: 2020-12-19 | End: 2020-12-21

## 2020-12-19 RX ORDER — HEPARIN SODIUM 5000 [USP'U]/ML
1900 INJECTION INTRAVENOUS; SUBCUTANEOUS
Qty: 25000 | Refills: 0 | Status: DISCONTINUED | OUTPATIENT
Start: 2020-12-19 | End: 2020-12-22

## 2020-12-19 RX ORDER — POTASSIUM CHLORIDE 20 MEQ
20 PACKET (EA) ORAL ONCE
Refills: 0 | Status: COMPLETED | OUTPATIENT
Start: 2020-12-19 | End: 2020-12-19

## 2020-12-19 RX ADMIN — METHADONE HYDROCHLORIDE 30 MILLIGRAM(S): 40 TABLET ORAL at 21:54

## 2020-12-19 RX ADMIN — Medication 20 MILLIGRAM(S): at 21:54

## 2020-12-19 RX ADMIN — HUMAN INSULIN 10 UNIT(S): 100 INJECTION, SUSPENSION SUBCUTANEOUS at 17:20

## 2020-12-19 RX ADMIN — MIDODRINE HYDROCHLORIDE 20 MILLIGRAM(S): 2.5 TABLET ORAL at 16:05

## 2020-12-19 RX ADMIN — Medication 10 MILLIGRAM(S): at 06:46

## 2020-12-19 RX ADMIN — PANTOPRAZOLE SODIUM 40 MILLIGRAM(S): 20 TABLET, DELAYED RELEASE ORAL at 13:30

## 2020-12-19 RX ADMIN — CHLORHEXIDINE GLUCONATE 15 MILLILITER(S): 213 SOLUTION TOPICAL at 06:46

## 2020-12-19 RX ADMIN — POLYETHYLENE GLYCOL 3350 17 GRAM(S): 17 POWDER, FOR SOLUTION ORAL at 13:30

## 2020-12-19 RX ADMIN — MUPIROCIN 1 APPLICATION(S): 20 OINTMENT TOPICAL at 06:46

## 2020-12-19 RX ADMIN — Medication 20 MILLIEQUIVALENT(S): at 09:32

## 2020-12-19 RX ADMIN — METHADONE HYDROCHLORIDE 20 MILLIGRAM(S): 40 TABLET ORAL at 06:46

## 2020-12-19 RX ADMIN — Medication 20 MILLIGRAM(S): at 15:53

## 2020-12-19 RX ADMIN — CHLORHEXIDINE GLUCONATE 15 MILLILITER(S): 213 SOLUTION TOPICAL at 17:15

## 2020-12-19 RX ADMIN — IMIPENEM AND CILASTATIN 200 MILLIGRAM(S): 250; 250 INJECTION, POWDER, FOR SOLUTION INTRAVENOUS at 03:20

## 2020-12-19 RX ADMIN — METHADONE HYDROCHLORIDE 30 MILLIGRAM(S): 40 TABLET ORAL at 15:53

## 2020-12-19 RX ADMIN — FENTANYL CITRATE 1.15 MICROGRAM(S)/KG/HR: 50 INJECTION INTRAVENOUS at 09:45

## 2020-12-19 RX ADMIN — HUMAN INSULIN 10 UNIT(S): 100 INJECTION, SUSPENSION SUBCUTANEOUS at 23:14

## 2020-12-19 RX ADMIN — HEPARIN SODIUM 1900 UNIT(S)/HR: 5000 INJECTION INTRAVENOUS; SUBCUTANEOUS at 13:32

## 2020-12-19 RX ADMIN — HUMAN INSULIN 10 UNIT(S): 100 INJECTION, SUSPENSION SUBCUTANEOUS at 13:31

## 2020-12-19 RX ADMIN — DEXMEDETOMIDINE HYDROCHLORIDE IN 0.9% SODIUM CHLORIDE 14.4 MICROGRAM(S)/KG/HR: 4 INJECTION INTRAVENOUS at 19:34

## 2020-12-19 RX ADMIN — MIDODRINE HYDROCHLORIDE 20 MILLIGRAM(S): 2.5 TABLET ORAL at 21:54

## 2020-12-19 RX ADMIN — IMIPENEM AND CILASTATIN 200 MILLIGRAM(S): 250; 250 INJECTION, POWDER, FOR SOLUTION INTRAVENOUS at 17:07

## 2020-12-19 RX ADMIN — DEXMEDETOMIDINE HYDROCHLORIDE IN 0.9% SODIUM CHLORIDE 14.4 MICROGRAM(S)/KG/HR: 4 INJECTION INTRAVENOUS at 12:11

## 2020-12-19 RX ADMIN — HEPARIN SODIUM 0 UNIT(S)/HR: 5000 INJECTION INTRAVENOUS; SUBCUTANEOUS at 20:34

## 2020-12-19 RX ADMIN — MIDODRINE HYDROCHLORIDE 30 MILLIGRAM(S): 2.5 TABLET ORAL at 06:52

## 2020-12-19 RX ADMIN — Medication 10.8 MICROGRAM(S)/KG/MIN: at 19:32

## 2020-12-19 RX ADMIN — HEPARIN SODIUM 1900 UNIT(S)/HR: 5000 INJECTION INTRAVENOUS; SUBCUTANEOUS at 06:53

## 2020-12-19 RX ADMIN — SENNA PLUS 15 MILLILITER(S): 8.6 TABLET ORAL at 17:14

## 2020-12-19 RX ADMIN — FENTANYL CITRATE 1.15 MICROGRAM(S)/KG/HR: 50 INJECTION INTRAVENOUS at 19:33

## 2020-12-19 RX ADMIN — MUPIROCIN 1 APPLICATION(S): 20 OINTMENT TOPICAL at 17:08

## 2020-12-19 RX ADMIN — HEPARIN SODIUM 1500 UNIT(S)/HR: 5000 INJECTION INTRAVENOUS; SUBCUTANEOUS at 21:54

## 2020-12-19 RX ADMIN — CHLORHEXIDINE GLUCONATE 1 APPLICATION(S): 213 SOLUTION TOPICAL at 06:46

## 2020-12-19 RX ADMIN — HEPARIN SODIUM 17 UNIT(S)/HR: 5000 INJECTION INTRAVENOUS; SUBCUTANEOUS at 01:07

## 2020-12-19 RX ADMIN — FENTANYL CITRATE 1.15 MICROGRAM(S)/KG/HR: 50 INJECTION INTRAVENOUS at 15:53

## 2020-12-19 NOTE — PROGRESS NOTE ADULT - SUBJECTIVE AND OBJECTIVE BOX
Subjective/24hour Events: No acute events overnight last night    Vital Signs:  Vital Signs Last 24 Hrs  T(C): 36.6 (19 Dec 2020 07:00), Max: 36.6 (19 Dec 2020 07:00)  T(F): 97.9 (19 Dec 2020 07:00), Max: 97.9 (19 Dec 2020 07:00)  HR: 101 (19 Dec 2020 10:00) (54 - 129)  BP: --  BP(mean): --  RR: 32 (19 Dec 2020 10:00) (7 - 32)  SpO2: 100% (19 Dec 2020 10:00) (96% - 100%)    CAPILLARY BLOOD GLUCOSE      POCT Blood Glucose.: 104 mg/dL (19 Dec 2020 06:41)  POCT Blood Glucose.: 117 mg/dL (18 Dec 2020 23:39)        Daily     Daily Weight in k.3 (19 Dec 2020 06:00)  I&O's Detail    18 Dec 2020 07:01  -  19 Dec 2020 07:00  --------------------------------------------------------  IN:    Dexmedetomidine: 46.1 mL    Enteral Tube Flush: 90 mL    FentaNYL: 138 mL    Heparin: 85 mL    Heparin Infusion: 38 mL    IV PiggyBack: 350 mL    Nepro: 100 mL  Total IN: 847.1 mL    OUT:    Indwelling Catheter - Urethral (mL): 2045 mL  Total OUT: 2045 mL    Total NET: -1197.9 mL      19 Dec 2020 07:01  -  19 Dec 2020 10:50  --------------------------------------------------------  IN:  Total IN: 0 mL    OUT:    Indwelling Catheter - Urethral (mL): 45 mL  Total OUT: 45 mL    Total NET: -45 mL        MEDICATIONS  (STANDING):  chlorhexidine 0.12% Liquid 15 milliLiter(s) Oral Mucosa every 12 hours  chlorhexidine 4% Liquid 1 Application(s) Topical <User Schedule>  dexMEDEtomidine Infusion 0.5 MICROgram(s)/kG/Hr (14.4 mL/Hr) IV Continuous <Continuous>  diazepam    Tablet 20 milliGRAM(s) Oral three times a day  fentaNYL   Infusion..... 0.5 MICROgram(s)/kG/Hr (1.15 mL/Hr) IV Continuous <Continuous>  heparin  Infusion. 1900 Unit(s)/Hr (19 mL/Hr) IV Continuous <Continuous>  imipenem/cilastatin  IVPB 250 milliGRAM(s) IV Intermittent every 12 hours  insulin lispro (ADMELOG) corrective regimen sliding scale   SubCutaneous every 6 hours  insulin NPH human recombinant 10 Unit(s) SubCutaneous every 6 hours  methadone   Solution 30 milliGRAM(s) Oral three times a day  midodrine 20 milliGRAM(s) Oral every 8 hours  mupirocin 2% Ointment 1 Application(s) Both Nostrils two times a day  pantoprazole  Injectable 40 milliGRAM(s) IV Push daily  polyethylene glycol 3350 17 Gram(s) Oral daily  senna Syrup 15 milliLiter(s) Oral two times a day    MEDICATIONS  (PRN):      Labs:                          7.8    11.51 )-----------( 162      ( 19 Dec 2020 04:13 )             25.8     12-19    132<L>  |  94<L>  |  44<H>  ----------------------------<  104<H>  3.3<L>   |  25  |  1.71<H>    Ca    8.4      19 Dec 2020 04:13  Phos  3.3     12-18  Mg     1.4     12-18    TPro  5.7<L>  /  Alb  1.9<L>  /  TBili  0.8  /  DBili  x   /  AST  15  /  ALT  <5<L>  /  AlkPhos  116  12-19    LIVER FUNCTIONS - ( 19 Dec 2020 04:13 )  Alb: 1.9 g/dL / Pro: 5.7 g/dL / ALK PHOS: 116 U/L / ALT: <5 U/L / AST: 15 U/L / GGT: x           PT/INR - ( 19 Dec 2020 04:13 )   PT: 15.8 sec;   INR: 1.41 ratio         PTT - ( 19 Dec 2020 04:13 )  PTT:54.5 sec        Physical Exam:  Gen: Intubated, Sedated  Neuro: PERRL  Pulm:  rhonchi to acw; trach insertion site c/d/i  Cardiac: S1S2, r/r/r, (-)m/r/g; 2+ pulses bilat throughout; cap refill <3secs  GI: Softly distended, (+)ventral hernia  Extremities: 2+pulses bilat UE and LE; warm to touch; 2+edema appreciated

## 2020-12-19 NOTE — PROGRESS NOTE ADULT - ATTENDING COMMENTS
Agree with above. Critically ill requiring frequent bedside visits. Severe COVID ARDS s/p tracheostomy. Continue supportive care. HD as needed.

## 2020-12-19 NOTE — PROGRESS NOTE ADULT - ASSESSMENT
59 y/o female w/ PMHx AARON, peritonitis s/p Oral's procedure and ileostomy (reversed 2011), HTN, prior DVT/PE (on xarelto), Asthma admitted to [facility] on 11/17/2020 w/ COVID + PNA.   Intubated for airway protection on 11/23/2020, and eventually transferred to [facility] on 12/10, now s/p trach on 12/18    -Continue excellent medical care  -Trach surgicel to be removed today  -Trach sutures to remain x14 days then will d/c  -Care per primary team

## 2020-12-19 NOTE — PROGRESS NOTE ADULT - ATTENDING COMMENTS
Patient examined and ROS reviewed. A case of non-oliguric ARTEMIO secondary to COVID started on RRT then on dialysis and now diuresing well with BUMEX drip. Patient has developed hypokalemia as a consequence to diuresis. Advised to replace K.

## 2020-12-19 NOTE — PROGRESS NOTE ADULT - SUBJECTIVE AND OBJECTIVE BOX
Significant recent/past 24 hr events: Trached by CT surg, tolerated it well.    Subjective:    Review of Systems       Unable to obtain due to: sedated           PAST MEDICAL & SURGICAL HISTORY:  Pneumomediastinum    Umbilical hernia  Reduciable    Osteoarthritis of both knees, unspecified osteoarthritis type    AARON (Obstructive Sleep Apnea)  category II pt uses c/pap pt to bering to hospital/  OR booking notified    Pneumonia      GERD (Gastroesophageal Reflux Disease)    Asthma  diagnosed   on adbvair denies attacks    DVT (Deep Venous Thrombosis)  left leg 6 m s/p knee replacement in     HTN (Hypertension)    H/O ileostomy  Ileostomy Revesal     S/P LEEP      S/P Exploratory Laparotomy    peritonitis  s/p right knee replacement/ colon resection and ileostomy    S/P Colonoscopy      S/P Endoscopy   gerd    S/P  Section      History of Tubal Ligation  s/p c/section     S/P Knee Surgery  2010      FAMILY HISTORY:  Family history of gastric cancer    Family history of breast cancer (Grandparent)        Vitals   ICU Vital Signs Last 24 Hrs  T(C): 36.6 (19 Dec 2020 07:00), Max: 36.6 (19 Dec 2020 07:00)  T(F): 97.9 (19 Dec 2020 07:00), Max: 97.9 (19 Dec 2020 07:00)  HR: 101 (19 Dec 2020 10:00) (54 - 129)  ABP: 139/61 (19 Dec 2020 10:00) (101/45 - 154/72)  ABP(mean): 93 (19 Dec 2020 10:00) (66 - 106)  RR: 32 (19 Dec 2020 10:00) (7 - 32)  SpO2: 100% (19 Dec 2020 10:00) (96% - 100%)      Physical Exam:   HEENT: + PERRLA, EOMI, no drainage or redness  Neck: supple,  No JVD  Respiratory: Trached and Ventilator assisted breath rhonchi bilaterally to auscultation, no accessory muscle use noted  Cardiovascular: Regular rate, regular rhythm, normal S1, S2; no murmurs or rub  Gastrointestinal: obese, soft, non-tender, non distended, no hepatosplenomegaly, normal bowel sounds  Extremities: + 2 peripheral edema, no cyanosis, no clubbing   Vascular: Equal and normal pulses: 2+ peripheral pulses throughout  Neurological: sedated  Skin: DTI to chin area, warm, dry, well perfused    VENT SETTINGS   Mode: AC/ CMV (Assist Control/ Continuous Mandatory Ventilation)  RR (machine): 32  TV (machine): 370  FiO2: 70  PEEP: 8  ITime: 0.59  MAP: 25  PIP: 45    ABG - ( 19 Dec 2020 04:13 )  pH, Arterial: 7.36  pH, Blood: x     /  pCO2: 46    /  pO2: 182   / HCO3: 25    / Base Excess: 0.3   /  SaO2: 99.7          I&O's Detail    18 Dec 2020 07:01  -  19 Dec 2020 07:00  --------------------------------------------------------  IN:    Dexmedetomidine: 46.1 mL    Enteral Tube Flush: 90 mL    FentaNYL: 138 mL    Heparin: 85 mL    Heparin Infusion: 38 mL    IV PiggyBack: 350 mL    Nepro: 100 mL  Total IN: 847.1 mL    OUT:    Indwelling Catheter - Urethral (mL): 2045 mL  Total OUT: 2045 mL    Total NET: -1197.9 mL      19 Dec 2020 07:01  -  19 Dec 2020 10:49  --------------------------------------------------------  IN:  Total IN: 0 mL    OUT:    Indwelling Catheter - Urethral (mL): 45 mL  Total OUT: 45 mL    Total NET: -45 mL          LABS                        7.8    11.51 )-----------( 162      ( 19 Dec 2020 04:13 )             25.8     12-19    132<L>  |  94<L>  |  44<H>  ----------------------------<  104<H>  3.3<L>   |  25  |  1.71<H>    Ca    8.4      19 Dec 2020 04:13  Phos  3.3     12-18  Mg     1.4     12-18    TPro  5.7<L>  /  Alb  1.9<L>  /  TBili  0.8  /  DBili  x   /  AST  15  /  ALT  <5<L>  /  AlkPhos  116  12-19    LIVER FUNCTIONS - ( 19 Dec 2020 04:13 )  Alb: 1.9 g/dL / Pro: 5.7 g/dL / ALK PHOS: 116 U/L / ALT: <5 U/L / AST: 15 U/L / GGT: x           PT/INR - ( 19 Dec 2020 04:13 )   PT: 15.8 sec;   INR: 1.41 ratio         PTT - ( 19 Dec 2020 04:13 )  PTT:54.5 sec          POCT Blood Glucose.: 104 mg/dL <H> (20 @ 06:41)  POCT Blood Glucose.: 117 mg/dL <H> (20 @ 23:39)        MEDICATIONS  (STANDING):  chlorhexidine 0.12% Liquid 15 milliLiter(s) Oral Mucosa every 12 hours  chlorhexidine 4% Liquid 1 Application(s) Topical <User Schedule>  dexMEDEtomidine Infusion 0.5 MICROgram(s)/kG/Hr (14.4 mL/Hr) IV Continuous <Continuous>  diazepam    Tablet 20 milliGRAM(s) Oral three times a day  fentaNYL   Infusion..... 0.5 MICROgram(s)/kG/Hr (1.15 mL/Hr) IV Continuous <Continuous>  heparin  Infusion. 1900 Unit(s)/Hr (19 mL/Hr) IV Continuous <Continuous>  imipenem/cilastatin  IVPB 250 milliGRAM(s) IV Intermittent every 12 hours  insulin lispro (ADMELOG) corrective regimen sliding scale   SubCutaneous every 6 hours  insulin NPH human recombinant 10 Unit(s) SubCutaneous every 6 hours  methadone   Solution 30 milliGRAM(s) Oral three times a day  midodrine 20 milliGRAM(s) Oral every 8 hours  mupirocin 2% Ointment 1 Application(s) Both Nostrils two times a day  pantoprazole  Injectable 40 milliGRAM(s) IV Push daily  polyethylene glycol 3350 17 Gram(s) Oral daily  senna Syrup 15 milliLiter(s) Oral two times a day    MEDICATIONS  (PRN):      Allergies:  Kiwi (Unknown)  latex (Anaphylaxis)  latex (Unknown)  penicillin (Other)  penicillin (Unknown)  perfume  hives (Other)  potassium acetate (Other)  soap additives hives (Other)

## 2020-12-19 NOTE — PROGRESS NOTE ADULT - ASSESSMENT
61 yo woman with AARON, h/o Lockett's procedure, s/p reversal 2011 who was admitted to [facility] 11/17 with covid pneumonia.  She received course of remdesevir and dexamethasone; required intubation 11/23 for acute hypoxemic respiratory failure. Hospital course complicated by Proteus bacteremia, MSSA bacteremia/ pneumonia, and Stenotrophomonas respiratory infection, as well as ATN requiring CVVHD now HD.  CT chest 12/4 new large consolidation with cavitation in right lung.  Patient transferred [facility] 12/10; spiked high fever; antibiotics broadened to vanco x 1 and meropenem 500 mg iv 24h.  Blood culture 11/27 MSSA and proteus; 11/28 MSSA.  Bacteremia cleared.   Blood cultures 12/1, 12/11 no growth.  Sputum culture 12/1 and 12/12 Stenotrophomonas.    Multifocal covid pneumonia with respiratory failure, MSSA bacteremia 11/27 with cavitary pneumonia, Proteus bacteremia 11/27, Stenotrophomonas pneumonia vs colonization, ATN on HD.  CxR 12/16- large right lung opacity.  perhaps aspiration pneumonia superimposed on ARDS due to covid.  Suspect Stenotrophomonas colonizing respiratory tract.  She recently received a 7 day course of levofloxacin 12/- 12/9.    Afebrile.  off pressors.  HD intermittently.  s/p trach 12/18.    suggest:  c/w  Imipenem 250 mg iv q 12 h while on HD to cover both aspiration pneumonia and MSSA bacteremia.  if renal function improves and HD requirement decreases may increase imipenem 500 mg iv q 12 h  duration antibiotics 12/28       Mila Burgos MD  Pager: [phone]  After 5 PM or weekends please call fellow on call or office [phone]

## 2020-12-19 NOTE — PROGRESS NOTE ADULT - ASSESSMENT
This is a 60 year old Female w/ pmh of HTN, DVT on Xarelto, peritonitis s/p Oral's procedure and ileostomy (reversed 2011), AARON who presented to [facility] on 11/18  w/ fevers found to have COVID-19, intubated 11/23, course complicated by superimposed PNA and bacteremia with Stenotrophomonas (12/11, completed Levaquin x7 days), MSSA/P. mirablis bacteremia (on Cefazolin, potentially due to urine vs line-associated and MSSA in sputum due to PNA), also with lung abscesses on CT chest on 12/5 , ARTEMIO/ATN requiring CRRT and HD. Now unable to wean from ventilator and transferred from [facility] on 12/10 to offload COVID unit. Unable to wean from ventilator, plan for trach/PEG with thoracic surgery on friday        #Neuro  Alert & oriented x3 @baseline.   - remains on Precedex and fentanyl for sedation  -will continue to come down on fentanyl if tolerated, methadone and valium increased today  -CTH negative   - last ammonia level was 66     #Resp  Acute hypoxemic respiratory failure 2/2 covid-19 PNA. Intubated on 11/23, now unable to wean from ventilator   - c/w AC 30/370/8/40%   - Trached by CT surg 12/18. CT surg to remove trach sutures on 1/1/21       #CV  - Continue Midodrine at 20 TID and wean as tolerated  - currently off pressors  - heparin gtt restarted post procedure  - CT negative for PE    #GI  - Continue TF, tolerating well  - Continue bowel regimen  -c/w PPI     #  ARTEMIO 2/2 ATN requiring CRRT, then Bumex challenge and now intermittent HD, finished trial with Bumex yesterday with okay response. Started on Bumex gtt now making good UO. Bumex gtt d/c'ed  -last HD 12/17, Bumex gtt d/c'ed  -renal following   - will trend electrolytes and replete as necessary  - Rivera in place  -strict I’s and O’s    #ID  Covid 19 pneumonia, MSSA bacteremia/pneumonia, now cavitary consolidation on CT chest.  - s/p remdesivir and dexamethasone   - + Sputum/blood MSSA, + P mirablis in blood, +Stenotrophomonas in sputum   - Was previously on Cefazolin for MSSA bacteremia. Pt continues with fevers, ABX broaden to Fadia and Vanco on 12/11  - s/p Levofloxacin   - caspofungin discontinued, no fungal growth.   - Blood cx from 12/7 NGTD. 12/12 sputum with gram negative rods now speciated to Stenotrophomonas   -will D/C meropenem (12/11-12/16)   - ID following  -c/w imipenem (12/16- 12/23) 250mg q12h x 2 weeks for cavitation and MSSA, may need to adjust dose if not requiring intermittent HD      #Heme  Hx DVT on xarelto. Anemic 12/13 with no evidence of bleeding s/p 1u PRBC   -Anticoagulation with Heparin gtt   -continue to trend CBC/ PTT     #Endo  - ISS q6hrs  - NPH q6hrs

## 2020-12-19 NOTE — PROGRESS NOTE ADULT - PROBLEM SELECTOR PLAN 1
Pt. with ARTEMIO in the setting of COVID-19. Pt. with likely ATN. Scr was WNL at 0.53 on 11/23/20 and progressively worsened to 3.0 on 11/27. Patient was initiated on CRRT (at [facility]) on 11/27/20 for oliguric ARTEMIO. CRRT was discontinued on 12/6/20 and transitioned to intermittent HD on 12/8/20. Last HD treatment on 12/17/20. Pt. started on Bumex infusion with good urine output. No plans for HD as per discussion with ICU team. Use Bumex PRN to maintain euvolemia. Will assess need for HD daily. Monitor labs and urine output. Avoid any potential nephrotoxins. Dose medications as per eGFR.

## 2020-12-19 NOTE — PROGRESS NOTE ADULT - SUBJECTIVE AND OBJECTIVE BOX
[facility] DIVISION OF KIDNEY DISEASES AND HYPERTENSION -- FOLLOW UP NOTE  --------------------------------------------------------------------------------  Chief Complaint: ARTEMIO in the setting of COVID-19, on RRT/dialysis.    24 hour events/subjective: Pt. was seen and examined at bedside today in the CTICU. Pt. transferred from [facility] on 12/10/20. Pt. currently intubated and on IV vasopressor to maintain MAP. Pt. underwent last HD on 12/17/20 with 0 UF to optimize for procedure trach proceudre. Pt. currently off diuretics, maintaining good urine output.     PAST HISTORY  --------------------------------------------------------------------------------  No significant changes to PMH, PSH, FHx, SHx, unless otherwise noted    ALLERGIES & MEDICATIONS  --------------------------------------------------------------------------------  Allergies    Kiwi (Unknown)  latex (Anaphylaxis)  latex (Unknown)  penicillin (Other)  penicillin (Unknown)  perfume  hives (Other)  potassium acetate (Other)  soap additives hives (Other)    Intolerances      Standing Inpatient Medications  chlorhexidine 0.12% Liquid 15 milliLiter(s) Oral Mucosa every 12 hours  chlorhexidine 4% Liquid 1 Application(s) Topical <User Schedule>  dexMEDEtomidine Infusion 0.5 MICROgram(s)/kG/Hr IV Continuous <Continuous>  diazepam    Tablet 10 milliGRAM(s) Oral three times a day  fentaNYL   Infusion..... 0.5 MICROgram(s)/kG/Hr IV Continuous <Continuous>  heparin  Infusion. 1900 Unit(s)/Hr IV Continuous <Continuous>  imipenem/cilastatin  IVPB 250 milliGRAM(s) IV Intermittent every 12 hours  insulin lispro (ADMELOG) corrective regimen sliding scale   SubCutaneous every 6 hours  insulin NPH human recombinant 10 Unit(s) SubCutaneous every 6 hours  methadone   Solution 20 milliGRAM(s) Oral three times a day  midodrine 20 milliGRAM(s) Oral every 8 hours  mupirocin 2% Ointment 1 Application(s) Both Nostrils two times a day  pantoprazole  Injectable 40 milliGRAM(s) IV Push daily  polyethylene glycol 3350 17 Gram(s) Oral daily  potassium chloride   Solution 20 milliEquivalent(s) Oral once  senna Syrup 15 milliLiter(s) Oral two times a day    REVIEW OF SYSTEMS  --------------------------------------------------------------------------------  Unable to obtain due to medical condition    VITALS/PHYSICAL EXAM  --------------------------------------------------------------------------------  T(C): 36.5 (12-19-20 @ 04:00), Max: 36.5 (12-19-20 @ 04:00)  HR: 102 (12-19-20 @ 08:00) (54 - 129)  BP: --  RR: 32 (12-19-20 @ 08:00) (7 - 32)  SpO2: 100% (12-19-20 @ 08:00) (96% - 100%)  Wt(kg): --    12-18-20 @ 07:01  -  12-19-20 @ 07:00  --------------------------------------------------------  IN: 847.1 mL / OUT: 2045 mL / NET: -1197.9 mL    12-19-20 @ 07:01  -  12-19-20 @ 09:16  --------------------------------------------------------  IN: 0 mL / OUT: 45 mL / NET: -45 mL    Physical Exam:  	(patient examined by inspection given COVID 19  	Gen: trach  	HEENT: trach  	Pulm: on vent  	CV: regular rate  	Ext: trace LE edema B/L  	Neuro: sedated  	Vascular access: Right IJ non-tunneled HD catheter site : No bleeding seen              : marky     LABS/STUDIES  --------------------------------------------------------------------------------              7.8    11.51 >-----------<  162      [12-19-20 @ 04:13]              25.8     132  |  94  |  44  ----------------------------<  104      [12-19-20 @ 04:13]  3.3   |  25  |  1.71        Ca     8.4     [12-19-20 @ 04:13]      Mg     1.4     [12-18-20 @ 02:43]      Phos  3.3     [12-18-20 @ 02:43]    Creatinine Trend:  SCr 1.71 [12-19 @ 04:13]  SCr 1.96 [12-18 @ 09:38]  SCr 1.97 [12-18 @ 02:43]  SCr 3.21 [12-17 @ 03:04]  SCr 3.10 [12-16 @ 03:51]

## 2020-12-19 NOTE — PROGRESS NOTE ADULT - SUBJECTIVE AND OBJECTIVE BOX
Follow Up:  covid pneumonia, respiratory failure, MSSA bacteremia, Proteus bacteremia, Stenotrophomonas positive culture, CKD     Inverval History/ROS:  remains intubated, sedated.  off pressors.  s/p trach 12/18.    Allergies  Kiwi (Unknown)  latex (Anaphylaxis)  latex (Unknown)  penicillin (Other)  penicillin (Unknown)  perfume  hives (Other)  potassium acetate (Other)  soap additives hives (Other)        ANTIMICROBIALS:  imipenem/cilastatin  IVPB 250 every 12 hours        OTHER MEDS:  chlorhexidine 0.12% Liquid 15 milliLiter(s) Oral Mucosa every 12 hours  chlorhexidine 4% Liquid 1 Application(s) Topical <User Schedule>  dexMEDEtomidine Infusion 0.5 MICROgram(s)/kG/Hr IV Continuous <Continuous>  diazepam    Tablet 10 milliGRAM(s) Oral three times a day  fentaNYL   Infusion..... 0.5 MICROgram(s)/kG/Hr IV Continuous <Continuous>  heparin  Infusion. 1100 Unit(s)/Hr IV Continuous <Continuous>  insulin lispro (ADMELOG) corrective regimen sliding scale   SubCutaneous every 6 hours  insulin NPH human recombinant 10 Unit(s) SubCutaneous every 6 hours  methadone   Solution 20 milliGRAM(s) Oral three times a day  midodrine 30 milliGRAM(s) Oral every 8 hours  mupirocin 2% Ointment 1 Application(s) Both Nostrils two times a day  norepinephrine Infusion 0.1 MICROgram(s)/kG/Min IV Continuous <Continuous>  pantoprazole  Injectable 40 milliGRAM(s) IV Push daily  polyethylene glycol 3350 17 Gram(s) Oral daily  senna Syrup 15 milliLiter(s) Oral two times a day      Vital Signs Last 24 Hrs  T(F): 97.9 (12-19-20 @ 07:00), Max: 97.9 (12-19-20 @ 07:00)  HR: 66 (12-19-20 @ 18:00)  BP: --  RR: 32 (12-19-20 @ 18:00)  SpO2: 100% (12-19-20 @ 18:00) (97% - 100%)    PHYSICAL EXAM:  General: intubated, sedated  HEAD/EYES: eschar lower lips, chin decreasing, dry.   rt TREE kramer  ENT:  NGT, trach  Cardiovascular: distant  Respiratory: clear ant  GI:   soft, umbilical hernia, multiple well healed scars  :   negro    Musculoskeletal:  no synovitis  Neurologic: sedated  Skin:  no rash  Psychiatric: unable  Lines: right axillary A line, Rt IJ Shiley, peripheral bilat                          7.4    12.27 )-----------( 191      ( 19 Dec 2020 14:32 )             25.7 12-19    132  |  94  |  44  ----------------------------<  104  3.3   |  25  |  1.71  Ca    8.4      19 Dec 2020 04:13Phos  3.3     12-18Mg     1.4     12-18  TPro  5.7  /  Alb  1.9  /  TBili  0.8  /  DBili  x   /  AST  15  /  ALT  <5  /  AlkPhos  116  12-19          MICROBIOLOGY:  v  .Sputum Sputum  12-12-20   Moderate Stenotrophomonas maltophilia  Normal Respiratory Jocelynn absent  --  Stenotrophomonas maltophilia      .Blood Blood-Venous  12-11-20   No growth to date.  --  --      .Urine Catheterized  12-11-20   No growth  --  --      .Urine Catheterized  12-09-20   No growth  --  --      .Blood Blood-Peripheral  12-07-20   No Growth Final  --  --      .Sputum Sputum  12-01-20   Moderate Stenotrophomonas maltophilia  Normal Respiratory Jocelynn present  --  Stenotrophomonas maltophilia      .Blood Blood-Peripheral  12-01-20   No Growth Final  --  --      .Urine Clean Catch (Midstream)  11-30-20   <10,000 CFU/mL Normal Urogenital Jocelynn  --  --      .Blood Blood-Peripheral  11-28-20   Growth in aerobic and anaerobic bottles: Staphylococcus aureus See  previous culture [id number]  --    Growth in aerobic bottle: Gram Positive Cocci in Clusters  Growth in anaerobic bottle: Gram Positive Cocci in Clusters      .Blood Blood-Peripheral  11-27-20   Growth in aerobic and anaerobic bottles: Staphylococcus aureus  Growth in anaerobic bottle: Staphylococcus hominis  Coag Negative Staphylococcus  Single set isolate, possible contaminant. Contact  Microbiology if susceptibility testing clinically  indicated.  ***Blood Panel PCR results on this specimen are available  approximately 3 hours after the Gram stain result.***  Gram stain, PCR, and/or culture results may not always  correspond due to difference in methodologies.  ************************************************************  This PCR assay was performed using [employer].  The following targets are tested for: Enterococcus,  vancomycin resistant enterococci, Listeria monocytogenes,  coagulase negative staphylococci, S. aureus,  methicillin resistant S. aureus, Streptococcus agalactiae  (Group B), S. pneumoniae, S. pyogenes (Group A),  Acinetobacter baumannii, Enterobacter cloacae, E. coli,  Klebsiella oxytoca, K. pneumoniae, Proteus sp.,  Serratia marcescens, Haemophilus influenzae,  Neisseria meningitidis, Pseudomonas aeruginosa, Candida  albicans, C. glabrata, C krusei, C parapsilosis,  C. tropicalis and the KPC resistance gene.  --  Blood Culture PCR  Staphylococcus aureus      .Sputum  11-25-20   Numerous Staphylococcus aureus  Normal Respiratory Jocelynn absent  --  Staphylococcus aureus      .Sputum Sputum  11-24-20   Moderate Staphylococcus aureus  Normal Respiratory Jocelynn absent  --  Staphylococcus aureus      .Blood Blood-Peripheral  11-19-20   No Growth Final  --  --      RADIOLOGY:    rd< from: Xray Chest 1 View- PORTABLE-Urgent (Xray Chest 1 View- PORTABLE-Urgent .) (12.16.20 @ 10:58) >  EXAM:  XR CHEST PORTABLE URGENT 1V        PROCEDURE DATE:  Dec 16 2020         INTERPRETATION:  TIME OF EXAM: December 16, 2020 at 10:57 AM    CLINICAL INFORMATION: ARDS secondary to Covid 19 pneumonia. Tube position.    TECHNIQUE:   Portable chest    INTERPRETATION:    Tip of the endotracheal tube is above the yuniel. Enteric tube with tip in the body of the stomach. Right IJ catheter may be Shiley.    Relatively localized large airspace opacity at the right lung base perhaps aspiration pneumonia superimposed over more generalized ARDS appearance. No pneumothorax or change from the last exam.      COMPARISON:  December 13      IMPRESSION:  1. Endotracheal and enteric tube in addition to right IJ catheter in satisfactory position.  2. No change in right lung base opacity questionable aspiration pneumonia overlying ARDS.              DAVIS VARGAS MD; Attending Radiologist  This document has been electronically signed. Dec 16 2020 11:15AM    < end of copied text >

## 2020-12-20 LAB
ADD ON TEST-SPECIMEN IN LAB: SIGNIFICANT CHANGE UP
ALBUMIN SERPL ELPH-MCNC: 1.9 G/DL — LOW (ref 3.3–5)
ALP SERPL-CCNC: 102 U/L — SIGNIFICANT CHANGE UP (ref 40–120)
ALT FLD-CCNC: <5 U/L — LOW (ref 4–33)
ANION GAP SERPL CALC-SCNC: 12 MMOL/L — SIGNIFICANT CHANGE UP (ref 7–14)
APTT BLD: 86.9 SEC — HIGH (ref 27–36.3)
APTT BLD: 87.9 SEC — HIGH (ref 27–36.3)
AST SERPL-CCNC: 13 U/L — SIGNIFICANT CHANGE UP (ref 4–32)
BASOPHILS # BLD AUTO: 0.11 K/UL — SIGNIFICANT CHANGE UP (ref 0–0.2)
BASOPHILS NFR BLD AUTO: 0.9 % — SIGNIFICANT CHANGE UP (ref 0–2)
BILIRUB SERPL-MCNC: 0.6 MG/DL — SIGNIFICANT CHANGE UP (ref 0.2–1.2)
BLOOD GAS ARTERIAL - LYTES,HGB,ICA,LACT RESULT: SIGNIFICANT CHANGE UP
BUN SERPL-MCNC: 46 MG/DL — HIGH (ref 7–23)
CALCIUM SERPL-MCNC: 8.2 MG/DL — LOW (ref 8.4–10.5)
CHLORIDE SERPL-SCNC: 97 MMOL/L — LOW (ref 98–107)
CO2 SERPL-SCNC: 25 MMOL/L — SIGNIFICANT CHANGE UP (ref 22–31)
CREAT SERPL-MCNC: 1.61 MG/DL — HIGH (ref 0.5–1.3)
EOSINOPHIL # BLD AUTO: 0.43 K/UL — SIGNIFICANT CHANGE UP (ref 0–0.5)
EOSINOPHIL NFR BLD AUTO: 3.6 % — SIGNIFICANT CHANGE UP (ref 0–6)
GLUCOSE BLDC GLUCOMTR-MCNC: 108 MG/DL — HIGH (ref 70–99)
GLUCOSE BLDC GLUCOMTR-MCNC: 146 MG/DL — HIGH (ref 70–99)
GLUCOSE BLDC GLUCOMTR-MCNC: 171 MG/DL — HIGH (ref 70–99)
GLUCOSE SERPL-MCNC: 133 MG/DL — HIGH (ref 70–99)
HCT VFR BLD CALC: 25 % — LOW (ref 34.5–45)
HGB BLD-MCNC: 7.2 G/DL — LOW (ref 11.5–15.5)
IANC: 8.25 K/UL — SIGNIFICANT CHANGE UP (ref 1.5–8.5)
LYMPHOCYTES # BLD AUTO: 1.17 K/UL — SIGNIFICANT CHANGE UP (ref 1–3.3)
LYMPHOCYTES # BLD AUTO: 9.9 % — LOW (ref 13–44)
MAGNESIUM SERPL-MCNC: 1.5 MG/DL — LOW (ref 1.6–2.6)
MCHC RBC-ENTMCNC: 28.1 PG — SIGNIFICANT CHANGE UP (ref 27–34)
MCHC RBC-ENTMCNC: 28.8 GM/DL — LOW (ref 32–36)
MCV RBC AUTO: 97.7 FL — SIGNIFICANT CHANGE UP (ref 80–100)
MONOCYTES # BLD AUTO: 0.85 K/UL — SIGNIFICANT CHANGE UP (ref 0–0.9)
MONOCYTES NFR BLD AUTO: 7.2 % — SIGNIFICANT CHANGE UP (ref 2–14)
NEUTROPHILS # BLD AUTO: 8.63 K/UL — HIGH (ref 1.8–7.4)
NEUTROPHILS NFR BLD AUTO: 71.2 % — SIGNIFICANT CHANGE UP (ref 43–77)
PHOSPHATE SERPL-MCNC: 4.7 MG/DL — HIGH (ref 2.5–4.5)
PLATELET # BLD AUTO: 196 K/UL — SIGNIFICANT CHANGE UP (ref 150–400)
POTASSIUM SERPL-MCNC: 3.1 MMOL/L — LOW (ref 3.5–5.3)
POTASSIUM SERPL-SCNC: 3.1 MMOL/L — LOW (ref 3.5–5.3)
PROT SERPL-MCNC: 5.8 G/DL — LOW (ref 6–8.3)
RBC # BLD: 2.56 M/UL — LOW (ref 3.8–5.2)
RBC # FLD: 22.5 % — HIGH (ref 10.3–14.5)
SODIUM SERPL-SCNC: 134 MMOL/L — LOW (ref 135–145)
WBC # BLD: 11.82 K/UL — HIGH (ref 3.8–10.5)
WBC # FLD AUTO: 11.82 K/UL — HIGH (ref 3.8–10.5)

## 2020-12-20 PROCEDURE — 99291 CRITICAL CARE FIRST HOUR: CPT

## 2020-12-20 PROCEDURE — 99232 SBSQ HOSP IP/OBS MODERATE 35: CPT | Mod: GC

## 2020-12-20 PROCEDURE — 99232 SBSQ HOSP IP/OBS MODERATE 35: CPT

## 2020-12-20 RX ORDER — MAGNESIUM SULFATE 500 MG/ML
2 VIAL (ML) INJECTION ONCE
Refills: 0 | Status: COMPLETED | OUTPATIENT
Start: 2020-12-20 | End: 2020-12-20

## 2020-12-20 RX ORDER — MIDODRINE HYDROCHLORIDE 2.5 MG/1
30 TABLET ORAL EVERY 8 HOURS
Refills: 0 | Status: DISCONTINUED | OUTPATIENT
Start: 2020-12-20 | End: 2020-12-23

## 2020-12-20 RX ORDER — POTASSIUM CHLORIDE 20 MEQ
40 PACKET (EA) ORAL ONCE
Refills: 0 | Status: COMPLETED | OUTPATIENT
Start: 2020-12-20 | End: 2020-12-20

## 2020-12-20 RX ADMIN — MIDODRINE HYDROCHLORIDE 20 MILLIGRAM(S): 2.5 TABLET ORAL at 05:26

## 2020-12-20 RX ADMIN — CHLORHEXIDINE GLUCONATE 15 MILLILITER(S): 213 SOLUTION TOPICAL at 18:02

## 2020-12-20 RX ADMIN — Medication 40 MILLIEQUIVALENT(S): at 05:25

## 2020-12-20 RX ADMIN — POLYETHYLENE GLYCOL 3350 17 GRAM(S): 17 POWDER, FOR SOLUTION ORAL at 11:55

## 2020-12-20 RX ADMIN — HUMAN INSULIN 10 UNIT(S): 100 INJECTION, SUSPENSION SUBCUTANEOUS at 05:27

## 2020-12-20 RX ADMIN — METHADONE HYDROCHLORIDE 30 MILLIGRAM(S): 40 TABLET ORAL at 23:00

## 2020-12-20 RX ADMIN — HUMAN INSULIN 10 UNIT(S): 100 INJECTION, SUSPENSION SUBCUTANEOUS at 11:54

## 2020-12-20 RX ADMIN — DEXMEDETOMIDINE HYDROCHLORIDE IN 0.9% SODIUM CHLORIDE 14.4 MICROGRAM(S)/KG/HR: 4 INJECTION INTRAVENOUS at 13:16

## 2020-12-20 RX ADMIN — CHLORHEXIDINE GLUCONATE 1 APPLICATION(S): 213 SOLUTION TOPICAL at 05:28

## 2020-12-20 RX ADMIN — Medication 20 MILLIGRAM(S): at 16:24

## 2020-12-20 RX ADMIN — Medication 20 MILLIGRAM(S): at 05:28

## 2020-12-20 RX ADMIN — IMIPENEM AND CILASTATIN 200 MILLIGRAM(S): 250; 250 INJECTION, POWDER, FOR SOLUTION INTRAVENOUS at 17:59

## 2020-12-20 RX ADMIN — Medication 2: at 11:54

## 2020-12-20 RX ADMIN — MUPIROCIN 1 APPLICATION(S): 20 OINTMENT TOPICAL at 05:26

## 2020-12-20 RX ADMIN — HUMAN INSULIN 10 UNIT(S): 100 INJECTION, SUSPENSION SUBCUTANEOUS at 18:07

## 2020-12-20 RX ADMIN — MIDODRINE HYDROCHLORIDE 30 MILLIGRAM(S): 2.5 TABLET ORAL at 16:24

## 2020-12-20 RX ADMIN — SENNA PLUS 15 MILLILITER(S): 8.6 TABLET ORAL at 18:02

## 2020-12-20 RX ADMIN — METHADONE HYDROCHLORIDE 30 MILLIGRAM(S): 40 TABLET ORAL at 05:28

## 2020-12-20 RX ADMIN — DEXMEDETOMIDINE HYDROCHLORIDE IN 0.9% SODIUM CHLORIDE 14.4 MICROGRAM(S)/KG/HR: 4 INJECTION INTRAVENOUS at 16:24

## 2020-12-20 RX ADMIN — MIDODRINE HYDROCHLORIDE 30 MILLIGRAM(S): 2.5 TABLET ORAL at 23:00

## 2020-12-20 RX ADMIN — HEPARIN SODIUM 1500 UNIT(S)/HR: 5000 INJECTION INTRAVENOUS; SUBCUTANEOUS at 04:32

## 2020-12-20 RX ADMIN — CHLORHEXIDINE GLUCONATE 15 MILLILITER(S): 213 SOLUTION TOPICAL at 05:26

## 2020-12-20 RX ADMIN — METHADONE HYDROCHLORIDE 30 MILLIGRAM(S): 40 TABLET ORAL at 16:23

## 2020-12-20 RX ADMIN — PANTOPRAZOLE SODIUM 40 MILLIGRAM(S): 20 TABLET, DELAYED RELEASE ORAL at 11:55

## 2020-12-20 RX ADMIN — Medication 50 GRAM(S): at 05:26

## 2020-12-20 RX ADMIN — IMIPENEM AND CILASTATIN 200 MILLIGRAM(S): 250; 250 INJECTION, POWDER, FOR SOLUTION INTRAVENOUS at 01:39

## 2020-12-20 RX ADMIN — Medication 10.8 MICROGRAM(S)/KG/MIN: at 21:08

## 2020-12-20 NOTE — PROGRESS NOTE ADULT - SUBJECTIVE AND OBJECTIVE BOX
[facility] DIVISION OF KIDNEY DISEASES AND HYPERTENSION -- FOLLOW UP NOTE  --------------------------------------------------------------------------------  Chief Complaint: ARTEMIO in the setting of COVID-19, on RRT/dialysis.    24 hour events/subjective: Pt. was seen and examined at bedside today in the CTICU. Pt. transferred from [facility] on 12/10/20. Pt. currently intubated and on IV vasopressor to maintain MAP. Pt. underwent last HD on 12/17/20. Pt. currently off diuretics, maintaining good urine output.     PAST HISTORY  --------------------------------------------------------------------------------  No significant changes to PMH, PSH, FHx, SHx, unless otherwise noted    ALLERGIES & MEDICATIONS  --------------------------------------------------------------------------------  Allergies    Kiwi (Unknown)  latex (Anaphylaxis)  latex (Unknown)  penicillin (Other)  penicillin (Unknown)  perfume  hives (Other)  potassium acetate (Other)  soap additives hives (Other)    Intolerances      Standing Inpatient Medications  chlorhexidine 0.12% Liquid 15 milliLiter(s) Oral Mucosa every 12 hours  chlorhexidine 4% Liquid 1 Application(s) Topical <User Schedule>  dexMEDEtomidine Infusion 0.5 MICROgram(s)/kG/Hr IV Continuous <Continuous>  diazepam    Tablet 20 milliGRAM(s) Oral three times a day  fentaNYL   Infusion..... 0.5 MICROgram(s)/kG/Hr IV Continuous <Continuous>  heparin  Infusion. 1900 Unit(s)/Hr IV Continuous <Continuous>  imipenem/cilastatin  IVPB 250 milliGRAM(s) IV Intermittent every 12 hours  insulin lispro (ADMELOG) corrective regimen sliding scale   SubCutaneous every 6 hours  insulin NPH human recombinant 10 Unit(s) SubCutaneous every 6 hours  methadone   Solution 30 milliGRAM(s) Oral three times a day  midodrine 20 milliGRAM(s) Oral every 8 hours  norepinephrine Infusion 0.05 MICROgram(s)/kG/Min IV Continuous <Continuous>  pantoprazole  Injectable 40 milliGRAM(s) IV Push daily  polyethylene glycol 3350 17 Gram(s) Oral daily  senna Syrup 15 milliLiter(s) Oral two times a day    REVIEW OF SYSTEMS  --------------------------------------------------------------------------------  Unable to obtain due to medical condition    VITALS/PHYSICAL EXAM  --------------------------------------------------------------------------------  T(C): 36.7 (12-20-20 @ 04:00), Max: 36.7 (12-20-20 @ 04:00)  HR: 61 (12-20-20 @ 08:15) (61 - 101)  BP: --  RR: 24 (12-20-20 @ 07:00) (24 - 32)  SpO2: 96% (12-20-20 @ 08:15) (92% - 100%)  Wt(kg): --    12-19-20 @ 07:01  -  12-20-20 @ 07:00  --------------------------------------------------------  IN: 1032.8 mL / OUT: 715 mL / NET: 317.8 mL    Physical Exam:  	(patient examined by inspection given COVID 19  	Gen: trach  	HEENT: trach  	Pulm: on vent  	CV: regular rate  	Ext: trace LE edema B/L  	Neuro: sedated  	Vascular access: Right IJ non-tunneled HD catheter site : No bleeding seen              : marky    LABS/STUDIES  --------------------------------------------------------------------------------              7.2    11.82 >-----------<  196      [12-20-20 @ 03:49]              25.0     134  |  97  |  46  ----------------------------<  133      [12-20-20 @ 03:49]  3.1   |  25  |  1.61        Ca     8.2     [12-20-20 @ 03:49]      Mg     1.5     [12-20-20 @ 03:51]      Phos  4.7     [12-20-20 @ 03:51]    Creatinine Trend:  SCr 1.61 [12-20 @ 03:49]  SCr 1.71 [12-19 @ 04:13]  SCr 1.96 [12-18 @ 09:38]  SCr 1.97 [12-18 @ 02:43]  SCr 3.21 [12-17 @ 03:04]

## 2020-12-20 NOTE — PROGRESS NOTE ADULT - PROBLEM SELECTOR PLAN 1
Pt. with ARTEMIO in the setting of COVID-19. Pt. with likely ATN. Scr was WNL at 0.53 on 11/23/20 and progressively worsened to 3.0 on 11/27. Patient was initiated on CRRT (at [facility]) on 11/27/20 for oliguric ARTEMIO. CRRT was discontinued on 12/6/20 and transitioned to intermittent HD on 12/8/20. Last HD treatment on 12/17/20. Pt. started on Bumex infusion with good urine output. Pt. with ARTEMIO/ATN in ? recovery. No plans for HD as per discussion with ICU team. Use IV Bumex PRN to maintain euvolemia. Will assess need for HD daily. Monitor labs and urine output. Avoid any potential nephrotoxins. Dose medications as per eGFR.

## 2020-12-20 NOTE — PROGRESS NOTE ADULT - SUBJECTIVE AND OBJECTIVE BOX
Follow Up:  covid pneumonia, respiratory failure, MSSA bacteremia, Proteus bacteremia, Stenotrophomonas positive culture, CKD     Inverval History/ROS:  remains intubated, sedated.  off pressors.  s/p trach 12/18.  renal function improving.    Allergies  Kiwi (Unknown)  latex (Anaphylaxis)  latex (Unknown)  penicillin (Other)  penicillin (Unknown)  perfume  hives (Other)  potassium acetate (Other)  soap additives hives (Other)        ANTIMICROBIALS:  imipenem/cilastatin  IVPB 250 every 12 hours        OTHER MEDS:  chlorhexidine 0.12% Liquid 15 milliLiter(s) Oral Mucosa every 12 hours  chlorhexidine 4% Liquid 1 Application(s) Topical <User Schedule>  dexMEDEtomidine Infusion 0.5 MICROgram(s)/kG/Hr IV Continuous <Continuous>  diazepam    Tablet 10 milliGRAM(s) Oral three times a day  fentaNYL   Infusion..... 0.5 MICROgram(s)/kG/Hr IV Continuous <Continuous>  heparin  Infusion. 1100 Unit(s)/Hr IV Continuous <Continuous>  insulin lispro (ADMELOG) corrective regimen sliding scale   SubCutaneous every 6 hours  insulin NPH human recombinant 10 Unit(s) SubCutaneous every 6 hours  methadone   Solution 20 milliGRAM(s) Oral three times a day  midodrine 30 milliGRAM(s) Oral every 8 hours  mupirocin 2% Ointment 1 Application(s) Both Nostrils two times a day  norepinephrine Infusion 0.1 MICROgram(s)/kG/Min IV Continuous <Continuous>  pantoprazole  Injectable 40 milliGRAM(s) IV Push daily  polyethylene glycol 3350 17 Gram(s) Oral daily  senna Syrup 15 milliLiter(s) Oral two times a day    Vital Signs Last 24 Hrs  T(F): 98 (12-20-20 @ 04:00), Max: 98 (12-20-20 @ 04:00)  HR: 62 (12-20-20 @ 16:00)  BP: --  RR: 19 (12-20-20 @ 10:00)  SpO2: 100% (12-20-20 @ 16:00) (92% - 100%)    PHYSICAL EXAM:  General: intubated, sedated  HEAD/EYES: eschar lower lips, chin decreasing, dry.   rt TREE kramer  ENT:  NGT, trach  Cardiovascular: distant  Respiratory: clear ant  GI:   soft, umbilical hernia, multiple well healed scars  :   negro    Musculoskeletal:  no synovitis  Neurologic: sedated  Skin:  no rash  Psychiatric: unable  Lines: right axillary A line, Rt IJ Shiley, peripheral bilat                                     7.2    11.82 )-----------( 196      ( 20 Dec 2020 03:49 )             25.0 12-20    134  |  97  |  46  ----------------------------<  133  3.1   |  25  |  1.61  Ca    8.2      20 Dec 2020 03:49Phos  4.7     12-20Mg     1.5     12-20  TPro  5.8  /  Alb  1.9  /  TBili  0.6  /  DBili  x   /  AST  13  /  ALT  <5  /  AlkPhos  102  12-20          MICROBIOLOGY:  v  .Sputum Sputum  12-12-20   Moderate Stenotrophomonas maltophilia  Normal Respiratory Jocelynn absent  --  Stenotrophomonas maltophilia      .Blood Blood-Venous  12-11-20   No growth to date.  --  --      .Urine Catheterized  12-11-20   No growth  --  --      .Urine Catheterized  12-09-20   No growth  --  --      .Blood Blood-Peripheral  12-07-20   No Growth Final  --  --      .Sputum Sputum  12-01-20   Moderate Stenotrophomonas maltophilia  Normal Respiratory Jocelynn present  --  Stenotrophomonas maltophilia      .Blood Blood-Peripheral  12-01-20   No Growth Final  --  --      .Urine Clean Catch (Midstream)  11-30-20   <10,000 CFU/mL Normal Urogenital Jocelynn  --  --      .Blood Blood-Peripheral  11-28-20   Growth in aerobic and anaerobic bottles: Staphylococcus aureus See  previous culture [id number]  --    Growth in aerobic bottle: Gram Positive Cocci in Clusters  Growth in anaerobic bottle: Gram Positive Cocci in Clusters      .Blood Blood-Peripheral  11-27-20   Growth in aerobic and anaerobic bottles: Staphylococcus aureus  Growth in anaerobic bottle: Staphylococcus hominis  Coag Negative Staphylococcus  Single set isolate, possible contaminant. Contact  Microbiology if susceptibility testing clinically  indicated.  ***Blood Panel PCR results on this specimen are available  approximately 3 hours after the Gram stain result.***  Gram stain, PCR, and/or culture results may not always  correspond due to difference in methodologies.  ************************************************************  This PCR assay was performed using [employer].  The following targets are tested for: Enterococcus,  vancomycin resistant enterococci, Listeria monocytogenes,  coagulase negative staphylococci, S. aureus,  methicillin resistant S. aureus, Streptococcus agalactiae  (Group B), S. pneumoniae, S. pyogenes (Group A),  Acinetobacter baumannii, Enterobacter cloacae, E. coli,  Klebsiella oxytoca, K. pneumoniae, Proteus sp.,  Serratia marcescens, Haemophilus influenzae,  Neisseria meningitidis, Pseudomonas aeruginosa, Candida  albicans, C. glabrata, C krusei, C parapsilosis,  C. tropicalis and the KPC resistance gene.  --  Blood Culture PCR  Staphylococcus aureus      .Sputum  11-25-20   Numerous Staphylococcus aureus  Normal Respiratory Jocelynn absent  --  Staphylococcus aureus      .Sputum Sputum  11-24-20   Moderate Staphylococcus aureus  Normal Respiratory Jocelynn absent  --  Staphylococcus aureus      .Blood Blood-Peripheral  11-19-20   No Growth Final  --  --      RADIOLOGY:    rd< from: Xray Chest 1 View- PORTABLE-Urgent (Xray Chest 1 View- PORTABLE-Urgent .) (12.16.20 @ 10:58) >  EXAM:  XR CHEST PORTABLE URGENT 1V        PROCEDURE DATE:  Dec 16 2020         INTERPRETATION:  TIME OF EXAM: December 16, 2020 at 10:57 AM    CLINICAL INFORMATION: ARDS secondary to Covid 19 pneumonia. Tube position.    TECHNIQUE:   Portable chest    INTERPRETATION:    Tip of the endotracheal tube is above the yuniel. Enteric tube with tip in the body of the stomach. Right IJ catheter may be Shiley.    Relatively localized large airspace opacity at the right lung base perhaps aspiration pneumonia superimposed over more generalized ARDS appearance. No pneumothorax or change from the last exam.      COMPARISON:  December 13      IMPRESSION:  1. Endotracheal and enteric tube in addition to right IJ catheter in satisfactory position.  2. No change in right lung base opacity questionable aspiration pneumonia overlying ARDS.              DAVIS VARGAS MD; Attending Radiologist  This document has been electronically signed. Dec 16 2020 11:15AM    < end of copied text >

## 2020-12-20 NOTE — PROGRESS NOTE ADULT - ASSESSMENT
59 yo woman with AARON, h/o Lockett's procedure, s/p reversal 2011 who was admitted to [facility] 11/17 with covid pneumonia.  She received course of remdesevir and dexamethasone; required intubation 11/23 for acute hypoxemic respiratory failure. Hospital course complicated by Proteus bacteremia, MSSA bacteremia/ pneumonia, and Stenotrophomonas respiratory infection, as well as ATN requiring CVVHD --> HD ---. off dialysis.  CT chest 12/4 new large consolidation with cavitation in right lung.  Patient transferred [facility] 12/10; spiked high fever; antibiotics broadened to vanco x 1 and meropenem 500 mg iv 24h.  Blood culture 11/27 MSSA and proteus; 11/28 MSSA.  Bacteremia cleared.   Blood cultures 12/1, 12/11 no growth.  Sputum culture 12/1 and 12/12 Stenotrophomonas.    Multifocal covid pneumonia with respiratory failure, MSSA bacteremia 11/27 with cavitary pneumonia, Proteus bacteremia 11/27, Stenotrophomonas pneumonia vs colonization, ATN.  CxR 12/16- large right lung opacity.  perhaps aspiration pneumonia superimposed on ARDS due to covid.  Suspect Stenotrophomonas colonizing respiratory tract.  She recently received a 7 day course of levofloxacin 12/- 12/9.    Afebrile.  off pressors.  HD intermittently.  renal function improving.  s/p trach 12/18.    suggest:   increase imipenem 500 mg iv q 12 h  duration antibiotics 12/28       Mila Burgos MD  Pager: [phone]  After 5 PM or weekends please call fellow on call or office [phone]

## 2020-12-20 NOTE — PROGRESS NOTE ADULT - ASSESSMENT
This is a 60 year old Female w/ pmh of HTN, DVT on Xarelto, peritonitis s/p Oral's procedure and ileostomy (reversed 2011), AARON who presented to [facility] on 11/18  w/ fevers found to have COVID-19, intubated 11/23, course complicated by superimposed PNA and bacteremia with Stenotrophomonas (12/11, completed Levaquin x7 days), MSSA/P. mirablis bacteremia (on Cefazolin, potentially due to urine vs line-associated and MSSA in sputum due to PNA), also with lung abscesses on CT chest on 12/5 , ARTEMIO/ATN requiring CRRT and HD. Now unable to wean from ventilator and transferred from [facility] on 12/10 to offload COVID unit. Unable to wean from ventilator, s/p tracheostmy with ctsx 12/18 but family did not consent to PEG      #Neuro  Alert & oriented x3 @baseline.   - remains on Precedex and fentanyl for sedation  -will continue to come down on fentanyl if tolerated and c/w methadone and valium   -CTH negative   - last ammonia level was 66     #Resp  Acute hypoxemic respiratory failure 2/2 covid-19 PNA, intubated 11/23. Course complicated by MSSA pneumonia, stenotrophomonas, p. mirabalis, and lung abscess on CT chest from 12/5. Unable to wean from ventilator now trached by ctx on 12/18   - c/w AC 30/370/8/40% will attempt PS trial after sedation is lowered and as tolerated   - Trached by CT surg 12/18. CT surg to remove trach sutures on 1/1/21     #CV  Intermittently requiring norepinephrine most likely from vasoplegic shock from sedation medications   - will increase midodrine to 30mg TID   - currently off pressors  - heparin gtt restarted post procedure  - CT negative for PE    #GI  - Continue TF, tolerating well  - Continue bowel regimen  -c/w PPI     #  ARTEMIO 2/2 ATN requiring CRRT, then Bumex challenge and now intermittent HD, finished trial with Bumex yesterday with okay response. Started on Bumex gtt now making good UO. Bumex gtt d/c'ed.   -last HD 12/17, Bumex gtt d/c'ed  -renal following   - will trend electrolytes and replete as necessary  - Rivera in place  -strict I’s and O’s  -will assess need for prn bumex     #ID  Covid 19 pneumonia, MSSA bacteremia/pneumonia, now cavitary consolidation on CT chest.  - s/p remdesivir and dexamethasone   - + Sputum/blood MSSA, + P mirablis in blood, +Stenotrophomonas in sputum   - Was previously on Cefazolin for MSSA bacteremia. Pt continues with fevers, ABX broaden to Fadia and Vanco on 12/11  - s/p Levofloxacin   - caspofungin discontinued, no fungal growth.   - Blood cx from 12/7 NGTD. 12/12 sputum with gram negative rods now speciated to Stenotrophomonas   -will D/C meropenem (12/11-12/16)   - ID following  -c/w imipenem (12/16- 12/28) 250mg q12h x 2 weeks for cavitation and MSSA, may need to adjust dose if not requiring intermittent HD      #Heme  Hx DVT on xarelto. Anemic 12/13 with no evidence of bleeding s/p 1u PRBC   -Anticoagulation with Heparin gtt   -continue to trend CBC/ PTT     #Endo  - ISS q6hrs  - NPH q6hrs

## 2020-12-20 NOTE — PROGRESS NOTE ADULT - ATTENDING COMMENTS
Agree with above. Seen and examined with team on rounds. Critically ill requiring frequent bedside visits. HD as needed, on diuretics. Supportive care.

## 2020-12-20 NOTE — PROGRESS NOTE ADULT - SUBJECTIVE AND OBJECTIVE BOX
Interval Events:   -hypokalemic overnight   -okay UO off bumex gtt       HPI:  Patient is a 61yo F w/ AARON, peritonitis s/p Oral's procedure and ileostomy (reversed 2011), HTN, prior DVT/PE, ?Asthma admitted on 11/18/2020 to [facility] after presenting for  productive cough  w/ SOB x9 days and diarrhea x7 days and fever x1 day. Patient was found to be COVID + on 11/17/2020 and completed remdesivir 11/18-11/22. Patient was intubated for airway protection on 11/23/2020. Patient has required proning (last 12/4/2020). Hospital course c/b by ATN, requiring CVVHD now on intermittent HD, unresponsive to bumex challenge last HD 12/8. Course also c/b Stenotrophomonas (12/11, completed Levaquin x7 days), MSSA/P. mirablis bacteremia (on Cefazolin, potentially due to urine vs line-associated and MSSA in sputum due to PNA), also with lung abscesses on CT chest on 12/5.  Patient currently on Volume AC 30/350/8/35% on Precedex. Patient intermittently opens eyes but is not yet responsive. Transferred over to [facility] on 12/10 to offload [facility] COVID ICU    (10 Dec 2020 14:13)      REVIEW OF SYSTEMS:    [x ] Unable to assess ROS because patient is trached/sedated/poor mental status     OBJECTIVE:  ICU Vital Signs Last 24 Hrs  T(C): 36.7 (20 Dec 2020 04:00), Max: 36.7 (20 Dec 2020 04:00)  T(F): 98 (20 Dec 2020 04:00), Max: 98 (20 Dec 2020 04:00)  HR: 62 (20 Dec 2020 10:51) (61 - 101)  BP: --  BP(mean): --  ABP: 109/42 (20 Dec 2020 10:00) (85/34 - 179/67)  ABP(mean): 64 (20 Dec 2020 10:00) (51 - 114)  RR: 19 (20 Dec 2020 10:00) (16 - 32)  SpO2: 96% (20 Dec 2020 10:51) (92% - 100%)    Mode: AC/ CMV (Assist Control/ Continuous Mandatory Ventilation), RR (machine): 32, TV (machine): 370, FiO2: 50, PEEP: 8, ITime: 0.76, MAP: 19, PIP: 36    12-19 @ 07:01  -  12-20 @ 07:00  --------------------------------------------------------  IN: 1032.8 mL / OUT: 715 mL / NET: 317.8 mL      CAPILLARY BLOOD GLUCOSE      POCT Blood Glucose.: 146 mg/dL (20 Dec 2020 05:23)      Physical Exam:   HEENT: + PERRLA, EOMI, no drainage or redness  Neck: supple,  No JVD  Respiratory: Trached and Ventilator assisted breath rhonchi bilaterally to auscultation, no accessory muscle use noted  Cardiovascular: Regular rate, regular rhythm, normal S1, S2; no murmurs or rub  Gastrointestinal: obese, soft, non-tender, non distended, no hepatosplenomegaly, normal bowel sounds  Extremities: + 2 peripheral edema, no cyanosis, no clubbing   Vascular: Equal and normal pulses: 2+ peripheral pulses throughout  Neurological: sedated  Skin: DTI to chin area, warm, dry, well perfused      HOSPITAL MEDICATIONS:  Standing Meds:  chlorhexidine 0.12% Liquid 15 milliLiter(s) Oral Mucosa every 12 hours  chlorhexidine 4% Liquid 1 Application(s) Topical <User Schedule>  dexMEDEtomidine Infusion 0.5 MICROgram(s)/kG/Hr IV Continuous <Continuous>  diazepam    Tablet 20 milliGRAM(s) Oral three times a day  fentaNYL   Infusion..... 0.5 MICROgram(s)/kG/Hr IV Continuous <Continuous>  heparin  Infusion. 1900 Unit(s)/Hr IV Continuous <Continuous>  imipenem/cilastatin  IVPB 250 milliGRAM(s) IV Intermittent every 12 hours  insulin lispro (ADMELOG) corrective regimen sliding scale   SubCutaneous every 6 hours  insulin NPH human recombinant 10 Unit(s) SubCutaneous every 6 hours  methadone   Solution 30 milliGRAM(s) Oral three times a day  midodrine 30 milliGRAM(s) Oral every 8 hours  norepinephrine Infusion 0.05 MICROgram(s)/kG/Min IV Continuous <Continuous>  pantoprazole  Injectable 40 milliGRAM(s) IV Push daily  polyethylene glycol 3350 17 Gram(s) Oral daily  senna Syrup 15 milliLiter(s) Oral two times a day      PRN Meds:      LABS:                        7.2    11.82 )-----------( 196      ( 20 Dec 2020 03:49 )             25.0     Hgb Trend: 7.2<--, 7.4<--, 7.8<--, 8.2<--, 7.9<--  12-20    134<L>  |  97<L>  |  46<H>  ----------------------------<  133<H>  3.1<L>   |  25  |  1.61<H>    Ca    8.2<L>      20 Dec 2020 03:49  Phos  4.7     12-20  Mg     1.5     12-20    TPro  5.8<L>  /  Alb  1.9<L>  /  TBili  0.6  /  DBili  x   /  AST  13  /  ALT  <5<L>  /  AlkPhos  102  12-20    Creatinine Trend: 1.61<--, 1.71<--, 1.96<--, 1.97<--, 3.21<--, 3.10<--  PT/INR - ( 19 Dec 2020 04:13 )   PT: 15.8 sec;   INR: 1.41 ratio         PTT - ( 20 Dec 2020 03:49 )  PTT:87.9 sec    Arterial Blood Gas:  12-20 @ 03:49  7.33/51/98/25/97.9/0.6  ABG lactate: --  Arterial Blood Gas:  12-19 @ 04:13  7.36/46/182/25/99.7/0.3  ABG lactate: --  Arterial Blood Gas:  12-18 @ 20:46  7.37/45/229/25/99.7/0.7  ABG lactate: --        MICROBIOLOGY:     RADIOLOGY:  [ ] Reviewed and interpreted by me

## 2020-12-21 LAB
ALBUMIN SERPL ELPH-MCNC: 2 G/DL — LOW (ref 3.3–5)
ALP SERPL-CCNC: 99 U/L — SIGNIFICANT CHANGE UP (ref 40–120)
ALT FLD-CCNC: <5 U/L — LOW (ref 4–33)
ANION GAP SERPL CALC-SCNC: 11 MMOL/L — SIGNIFICANT CHANGE UP (ref 7–14)
APPEARANCE UR: ABNORMAL
APTT BLD: 64.3 SEC — HIGH (ref 27–36.3)
AST SERPL-CCNC: 14 U/L — SIGNIFICANT CHANGE UP (ref 4–32)
BASOPHILS # BLD AUTO: 0.07 K/UL — SIGNIFICANT CHANGE UP (ref 0–0.2)
BASOPHILS NFR BLD AUTO: 0.6 % — SIGNIFICANT CHANGE UP (ref 0–2)
BILIRUB SERPL-MCNC: 0.6 MG/DL — SIGNIFICANT CHANGE UP (ref 0.2–1.2)
BILIRUB UR-MCNC: NEGATIVE — SIGNIFICANT CHANGE UP
BLOOD GAS ARTERIAL - LYTES,HGB,ICA,LACT RESULT: SIGNIFICANT CHANGE UP
BUN SERPL-MCNC: 49 MG/DL — HIGH (ref 7–23)
CALCIUM SERPL-MCNC: 8.3 MG/DL — LOW (ref 8.4–10.5)
CHLORIDE SERPL-SCNC: 98 MMOL/L — SIGNIFICANT CHANGE UP (ref 98–107)
CO2 SERPL-SCNC: 26 MMOL/L — SIGNIFICANT CHANGE UP (ref 22–31)
COLOR SPEC: YELLOW — SIGNIFICANT CHANGE UP
CREAT SERPL-MCNC: 1.53 MG/DL — HIGH (ref 0.5–1.3)
DIFF PNL FLD: NEGATIVE — SIGNIFICANT CHANGE UP
EOSINOPHIL # BLD AUTO: 0.25 K/UL — SIGNIFICANT CHANGE UP (ref 0–0.5)
EOSINOPHIL NFR BLD AUTO: 2.3 % — SIGNIFICANT CHANGE UP (ref 0–6)
GLUCOSE BLDC GLUCOMTR-MCNC: 114 MG/DL — HIGH (ref 70–99)
GLUCOSE BLDC GLUCOMTR-MCNC: 118 MG/DL — HIGH (ref 70–99)
GLUCOSE BLDC GLUCOMTR-MCNC: 123 MG/DL — HIGH (ref 70–99)
GLUCOSE BLDC GLUCOMTR-MCNC: 145 MG/DL — HIGH (ref 70–99)
GLUCOSE BLDC GLUCOMTR-MCNC: 147 MG/DL — HIGH (ref 70–99)
GLUCOSE SERPL-MCNC: 124 MG/DL — HIGH (ref 70–99)
GLUCOSE UR QL: NEGATIVE — SIGNIFICANT CHANGE UP
HCT VFR BLD CALC: 24.9 % — LOW (ref 34.5–45)
HGB BLD-MCNC: 7.4 G/DL — LOW (ref 11.5–15.5)
IANC: 7.36 K/UL — SIGNIFICANT CHANGE UP (ref 1.5–8.5)
IMM GRANULOCYTES NFR BLD AUTO: 9.9 % — HIGH (ref 0–1.5)
INR BLD: 1.4 RATIO — HIGH (ref 0.88–1.17)
KETONES UR-MCNC: NEGATIVE — SIGNIFICANT CHANGE UP
LEUKOCYTE ESTERASE UR-ACNC: ABNORMAL
LYMPHOCYTES # BLD AUTO: 1.55 K/UL — SIGNIFICANT CHANGE UP (ref 1–3.3)
LYMPHOCYTES # BLD AUTO: 14.1 % — SIGNIFICANT CHANGE UP (ref 13–44)
MAGNESIUM SERPL-MCNC: 1.8 MG/DL — SIGNIFICANT CHANGE UP (ref 1.6–2.6)
MCHC RBC-ENTMCNC: 28.9 PG — SIGNIFICANT CHANGE UP (ref 27–34)
MCHC RBC-ENTMCNC: 29.7 GM/DL — LOW (ref 32–36)
MCV RBC AUTO: 97.3 FL — SIGNIFICANT CHANGE UP (ref 80–100)
MONOCYTES # BLD AUTO: 0.69 K/UL — SIGNIFICANT CHANGE UP (ref 0–0.9)
MONOCYTES NFR BLD AUTO: 6.3 % — SIGNIFICANT CHANGE UP (ref 2–14)
NEUTROPHILS # BLD AUTO: 7.36 K/UL — SIGNIFICANT CHANGE UP (ref 1.8–7.4)
NEUTROPHILS NFR BLD AUTO: 66.8 % — SIGNIFICANT CHANGE UP (ref 43–77)
NITRITE UR-MCNC: NEGATIVE — SIGNIFICANT CHANGE UP
NRBC # BLD: 2 /100 WBCS — SIGNIFICANT CHANGE UP
NRBC # FLD: 0.25 K/UL — HIGH
PH UR: 5 — SIGNIFICANT CHANGE UP (ref 5–8)
PHOSPHATE SERPL-MCNC: 4.3 MG/DL — SIGNIFICANT CHANGE UP (ref 2.5–4.5)
PLATELET # BLD AUTO: 208 K/UL — SIGNIFICANT CHANGE UP (ref 150–400)
POTASSIUM SERPL-MCNC: 3.5 MMOL/L — SIGNIFICANT CHANGE UP (ref 3.5–5.3)
POTASSIUM SERPL-SCNC: 3.5 MMOL/L — SIGNIFICANT CHANGE UP (ref 3.5–5.3)
PROT SERPL-MCNC: 5.8 G/DL — LOW (ref 6–8.3)
PROT UR-MCNC: ABNORMAL
PROTHROM AB SERPL-ACNC: 15.8 SEC — HIGH (ref 9.8–13.1)
RBC # BLD: 2.56 M/UL — LOW (ref 3.8–5.2)
RBC # FLD: 22.3 % — HIGH (ref 10.3–14.5)
SODIUM SERPL-SCNC: 135 MMOL/L — SIGNIFICANT CHANGE UP (ref 135–145)
SP GR SPEC: 1.01 — LOW (ref 1.01–1.02)
UROBILINOGEN FLD QL: ABNORMAL
VANCOMYCIN FLD-MCNC: 4.5 UG/ML — SIGNIFICANT CHANGE UP
WBC # BLD: 11.01 K/UL — HIGH (ref 3.8–10.5)
WBC # FLD AUTO: 11.01 K/UL — HIGH (ref 3.8–10.5)

## 2020-12-21 PROCEDURE — 99233 SBSQ HOSP IP/OBS HIGH 50: CPT

## 2020-12-21 PROCEDURE — 99291 CRITICAL CARE FIRST HOUR: CPT

## 2020-12-21 PROCEDURE — 99251: CPT

## 2020-12-21 RX ORDER — ACETAMINOPHEN 500 MG
1000 TABLET ORAL ONCE
Refills: 0 | Status: COMPLETED | OUTPATIENT
Start: 2020-12-21 | End: 2020-12-21

## 2020-12-21 RX ORDER — MIDAZOLAM HYDROCHLORIDE 1 MG/ML
2 INJECTION, SOLUTION INTRAMUSCULAR; INTRAVENOUS ONCE
Refills: 0 | Status: DISCONTINUED | OUTPATIENT
Start: 2020-12-21 | End: 2020-12-21

## 2020-12-21 RX ADMIN — HUMAN INSULIN 10 UNIT(S): 100 INJECTION, SUSPENSION SUBCUTANEOUS at 05:43

## 2020-12-21 RX ADMIN — MIDODRINE HYDROCHLORIDE 30 MILLIGRAM(S): 2.5 TABLET ORAL at 13:34

## 2020-12-21 RX ADMIN — METHADONE HYDROCHLORIDE 30 MILLIGRAM(S): 40 TABLET ORAL at 13:26

## 2020-12-21 RX ADMIN — METHADONE HYDROCHLORIDE 30 MILLIGRAM(S): 40 TABLET ORAL at 22:47

## 2020-12-21 RX ADMIN — Medication 20 MILLIGRAM(S): at 05:41

## 2020-12-21 RX ADMIN — CHLORHEXIDINE GLUCONATE 1 APPLICATION(S): 213 SOLUTION TOPICAL at 07:19

## 2020-12-21 RX ADMIN — MIDODRINE HYDROCHLORIDE 30 MILLIGRAM(S): 2.5 TABLET ORAL at 22:47

## 2020-12-21 RX ADMIN — Medication 20 MILLIGRAM(S): at 22:46

## 2020-12-21 RX ADMIN — SENNA PLUS 10 MILLILITER(S): 8.6 TABLET ORAL at 17:34

## 2020-12-21 RX ADMIN — CHLORHEXIDINE GLUCONATE 15 MILLILITER(S): 213 SOLUTION TOPICAL at 17:33

## 2020-12-21 RX ADMIN — HUMAN INSULIN 10 UNIT(S): 100 INJECTION, SUSPENSION SUBCUTANEOUS at 12:04

## 2020-12-21 RX ADMIN — Medication 400 MILLIGRAM(S): at 17:40

## 2020-12-21 RX ADMIN — MIDAZOLAM HYDROCHLORIDE 2 MILLIGRAM(S): 1 INJECTION, SOLUTION INTRAMUSCULAR; INTRAVENOUS at 21:00

## 2020-12-21 RX ADMIN — HUMAN INSULIN 10 UNIT(S): 100 INJECTION, SUSPENSION SUBCUTANEOUS at 02:09

## 2020-12-21 RX ADMIN — HUMAN INSULIN 10 UNIT(S): 100 INJECTION, SUSPENSION SUBCUTANEOUS at 17:41

## 2020-12-21 RX ADMIN — IMIPENEM AND CILASTATIN 200 MILLIGRAM(S): 250; 250 INJECTION, POWDER, FOR SOLUTION INTRAVENOUS at 14:01

## 2020-12-21 RX ADMIN — POLYETHYLENE GLYCOL 3350 17 GRAM(S): 17 POWDER, FOR SOLUTION ORAL at 12:03

## 2020-12-21 RX ADMIN — PANTOPRAZOLE SODIUM 40 MILLIGRAM(S): 20 TABLET, DELAYED RELEASE ORAL at 12:03

## 2020-12-21 RX ADMIN — Medication 20 MILLIGRAM(S): at 13:27

## 2020-12-21 RX ADMIN — Medication 1000 MILLIGRAM(S): at 17:49

## 2020-12-21 RX ADMIN — CHLORHEXIDINE GLUCONATE 15 MILLILITER(S): 213 SOLUTION TOPICAL at 05:43

## 2020-12-21 RX ADMIN — METHADONE HYDROCHLORIDE 30 MILLIGRAM(S): 40 TABLET ORAL at 05:41

## 2020-12-21 RX ADMIN — HUMAN INSULIN 10 UNIT(S): 100 INJECTION, SUSPENSION SUBCUTANEOUS at 23:48

## 2020-12-21 RX ADMIN — IMIPENEM AND CILASTATIN 200 MILLIGRAM(S): 250; 250 INJECTION, POWDER, FOR SOLUTION INTRAVENOUS at 03:00

## 2020-12-21 RX ADMIN — MIDODRINE HYDROCHLORIDE 30 MILLIGRAM(S): 2.5 TABLET ORAL at 05:41

## 2020-12-21 NOTE — PROGRESS NOTE ADULT - PROBLEM SELECTOR PLAN 2
Hypokalemia in the setting of increasing urine output with Bumex. Serum potassium low at 3.1 today.  Replete potassium accordingly (at least 100 mEq). Use Bumex IV PRN to maintain euvolemia. Monitor serum potassium.    If any questions, please feel free to contact me  Hermann Presley   Nephrology Fellow  [phone]  (After 5 pm or on weekends please page the on-call fellow)
Hypokalemia in the setting of increasing urine output with Bumex.  Replete potassium accordingly. Use Bumex IV PRN to maintain euvolemia. Monitor serum potassium.    If any questions, please feel free to contact me  Hermann Presley   Nephrology Fellow  [phone]  (After 5 pm or on weekends please page the on-call fellow)
Resolved s/p replacement. Monitor serum potassium.    Will sign off pt. at this time. Please re-consult as needed.  If any questions, please feel free to contact me  Hermann Presley   Nephrology Fellow  [phone]  (After 5 pm or on weekends please page the on-call fellow)
Hypokalemia in the setting of increasing urine output with bumex.  recommend stop bumex infusion and use bumex boluses as needed to maintain euvolemia.  replete and monitor k.

## 2020-12-21 NOTE — PROGRESS NOTE ADULT - ATTENDING COMMENTS
Agree with above. Patient seen and examined during bedside rounds. She is a 60 F who presented to [facility] 11/18 (transferred to [facility] 12/10) with dyspnea. Tested positive for COVID-19+ on 11/17. Intubated 11/23 for acute hypoxemic respiratory failure. Course c/b lung abscess, MSSA bacteremia, and stenotrophomonas in sputum. Currently on imipenem/cilastatin. Also developed an ARTEMIO requiring HD. Urine output improving off diuretics. Last HD session was 12/17. Tracheostomy was placed 12/18. Will continue to wean sedation. Plan to pull dialysis catheter today. Scheduled for PEG placement this week.

## 2020-12-21 NOTE — CHART NOTE - NSCHARTNOTEFT_GEN_A_CORE
Pt booked for EGD, PEG on Thursday 12/24/2020 with Dr Cruz  will need to hold heparin gtt prior to OR  care per primary team    Anna ACOSTA [id number]

## 2020-12-21 NOTE — PROGRESS NOTE ADULT - SUBJECTIVE AND OBJECTIVE BOX
Interval Events:   -titrated off fentanyl gtt     HPI:  Patient is a 61yo F w/ AARON, peritonitis s/p Oral's procedure and ileostomy (reversed 2011), HTN, prior DVT/PE, ?Asthma admitted on 11/18/2020 to [facility] after presenting for  productive cough  w/ SOB x9 days and diarrhea x7 days and fever x1 day. Patient was found to be COVID + on 11/17/2020 and completed remdesivir 11/18-11/22. Patient was intubated for airway protection on 11/23/2020. Patient has required proning (last 12/4/2020). Hospital course c/b by ATN, requiring CVVHD now on intermittent HD, unresponsive to bumex challenge last HD 12/8. Course also c/b Stenotrophomonas (12/11, completed Levaquin x7 days), MSSA/P. mirablis bacteremia (on Cefazolin, potentially due to urine vs line-associated and MSSA in sputum due to PNA), also with lung abscesses on CT chest on 12/5.  Patient currently on Volume AC 30/350/8/35% on Precedex. Patient intermittently opens eyes but is not yet responsive. Transferred over to [facility] on 12/10 to offload [facility] COVID ICU    (10 Dec 2020 14:13)      REVIEW OF SYSTEMS:  Constitutional: [ ] negative [ ] fevers [ ] chills [ ] weight loss [ ] weight gain  HEENT: [ ] negative [ ] dry eyes [ ] eye irritation [ ] postnasal drip [ ] nasal congestion  CV: [ ] negative  [ ] chest pain [ ] orthopnea [ ] palpitations [ ] murmur  Resp: [ ] negative [ ] cough [ ] shortness of breath [ ] dyspnea [ ] wheezing [ ] sputum [ ] hemoptysis  GI: [ ] negative [ ] nausea [ ] vomiting [ ] diarrhea [ ] constipation [ ] abd pain [ ] dysphagia   : [ ] negative [ ] dysuria [ ] nocturia [ ] hematuria [ ] increased urinary frequency  Musculoskeletal: [ ] negative [ ] back pain [ ] myalgias [ ] arthralgias [ ] fracture  Skin: [ ] negative [ ] rash [ ] itch  Neurological: [ ] negative [ ] headache [ ] dizziness [ ] syncope [ ] weakness [ ] numbness  Psychiatric: [ ] negative [ ] anxiety [ ] depression  Endocrine: [ ] negative [ ] diabetes [ ] thyroid problem  Hematologic/Lymphatic: [ ] negative [ ] anemia [ ] bleeding problem  Allergic/Immunologic: [ ] negative [ ] itchy eyes [ ] nasal discharge [ ] hives [ ] angioedema  [ ] All other systems negative  [x ] Unable to assess ROS because patient is trached/sedated  OBJECTIVE:  ICU Vital Signs Last 24 Hrs  T(C): 36.8 (21 Dec 2020 08:00), Max: 37.3 (20 Dec 2020 20:00)  T(F): 98.2 (21 Dec 2020 08:00), Max: 99.1 (20 Dec 2020 20:00)  HR: 68 (21 Dec 2020 10:46) (60 - 91)  BP: --  BP(mean): --  ABP: 122/47 (21 Dec 2020 10:00) (104/40 - 150/58)  ABP(mean): 71 (21 Dec 2020 10:00) (61 - 93)  RR: 32 (21 Dec 2020 10:00) (21 - 32)  SpO2: 99% (21 Dec 2020 10:46) (50% - 100%)    Mode: AC/ CMV (Assist Control/ Continuous Mandatory Ventilation), RR (machine): 32, TV (machine): 370, FiO2: 50, PEEP: 8, ITime: 0.74, MAP: 20, PIP: 43    12-20 @ 07:01  -  12-21 @ 07:00  --------------------------------------------------------  IN: 1071.9 mL / OUT: 1390 mL / NET: -318.1 mL    12-21 @ 07:01  -  12-21 @ 11:05  --------------------------------------------------------  IN: 59.4 mL / OUT: 73 mL / NET: -13.6 mL      CAPILLARY BLOOD GLUCOSE      POCT Blood Glucose.: 147 mg/dL (21 Dec 2020 05:42)    Physical Exam:   HEENT: + PERRLA, EOMI, no drainage or redness  Neck: supple,  No JVD  Respiratory: Trached and Ventilator assisted breath rhonchi bilaterally to auscultation, no accessory muscle use noted  Cardiovascular: Regular rate, regular rhythm, normal S1, S2; no murmurs or rub  Gastrointestinal: obese, soft, non-tender, non distended, no hepatosplenomegaly, normal bowel sounds  Extremities: + 2 peripheral edema, no cyanosis, no clubbing   Vascular: Equal and normal pulses: 2+ peripheral pulses throughout  Neurological: sedated  Skin: DTI to chin area, warm, dry, well perfused        HOSPITAL MEDICATIONS:  Standing Meds:  chlorhexidine 0.12% Liquid 15 milliLiter(s) Oral Mucosa every 12 hours  chlorhexidine 4% Liquid 1 Application(s) Topical <User Schedule>  dexMEDEtomidine Infusion 0.5 MICROgram(s)/kG/Hr IV Continuous <Continuous>  diazepam    Tablet 20 milliGRAM(s) Oral three times a day  heparin  Infusion. 1900 Unit(s)/Hr IV Continuous <Continuous>  imipenem/cilastatin  IVPB 250 milliGRAM(s) IV Intermittent every 12 hours  insulin lispro (ADMELOG) corrective regimen sliding scale   SubCutaneous every 6 hours  insulin NPH human recombinant 10 Unit(s) SubCutaneous every 6 hours  methadone   Solution 30 milliGRAM(s) Oral three times a day  midodrine 30 milliGRAM(s) Oral every 8 hours  pantoprazole  Injectable 40 milliGRAM(s) IV Push daily  polyethylene glycol 3350 17 Gram(s) Oral daily  senna Syrup 15 milliLiter(s) Oral two times a day      PRN Meds:      LABS:                        7.4    11.01 )-----------( 208      ( 21 Dec 2020 03:49 )             24.9     Hgb Trend: 7.4<--, 7.2<--, 7.4<--, 7.8<--, 8.2<--  12-21    135  |  98  |  49<H>  ----------------------------<  124<H>  3.5   |  26  |  1.53<H>    Ca    8.3<L>      21 Dec 2020 03:49  Phos  4.3     12-21  Mg     1.8     12-21    TPro  5.8<L>  /  Alb  2.0<L>  /  TBili  0.6  /  DBili  x   /  AST  14  /  ALT  <5<L>  /  AlkPhos  99  12-21    Creatinine Trend: 1.53<--, 1.61<--, 1.71<--, 1.96<--, 1.97<--, 3.21<--  PT/INR - ( 21 Dec 2020 03:49 )   PT: 15.8 sec;   INR: 1.40 ratio         PTT - ( 21 Dec 2020 03:49 )  PTT:64.3 sec    Arterial Blood Gas:  12-21 @ 03:49  7.35/47/123/25/99.0/0.5  ABG lactate: --  Arterial Blood Gas:  12-20 @ 03:49  7.33/51/98/25/97.9/0.6  ABG lactate: --        MICROBIOLOGY:     RADIOLOGY:  [ ] Reviewed and interpreted by me       Interval Events:   -titrated off fentanyl gtt     HPI:  Patient is a 59yo F w/ AARON, peritonitis s/p Oral's procedure and ileostomy (reversed 2011), HTN, prior DVT/PE, ?Asthma admitted on 11/18/2020 to [facility] after presenting for  productive cough  w/ SOB x9 days and diarrhea x7 days and fever x1 day. Patient was found to be COVID + on 11/17/2020 and completed remdesivir 11/18-11/22. Patient was intubated for airway protection on 11/23/2020. Patient has required proning (last 12/4/2020). Hospital course c/b by ATN, requiring CVVHD now on intermittent HD, unresponsive to bumex challenge last HD 12/8. Course also c/b Stenotrophomonas (12/11, completed Levaquin x7 days), MSSA/P. mirablis bacteremia (on Cefazolin, potentially due to urine vs line-associated and MSSA in sputum due to PNA), also with lung abscesses on CT chest on 12/5.  Patient currently on Volume AC 30/350/8/35% on Precedex. Patient intermittently opens eyes but is not yet responsive. Transferred over to [facility] on 12/10 to offload [facility] COVID ICU    (10 Dec 2020 14:13)      REVIEW OF SYSTEMS:    [x ] Unable to assess ROS because patient is trached/sedated    OBJECTIVE:  ICU Vital Signs Last 24 Hrs  T(C): 36.8 (21 Dec 2020 08:00), Max: 37.3 (20 Dec 2020 20:00)  T(F): 98.2 (21 Dec 2020 08:00), Max: 99.1 (20 Dec 2020 20:00)  HR: 68 (21 Dec 2020 10:46) (60 - 91)  BP: --  BP(mean): --  ABP: 122/47 (21 Dec 2020 10:00) (104/40 - 150/58)  ABP(mean): 71 (21 Dec 2020 10:00) (61 - 93)  RR: 32 (21 Dec 2020 10:00) (21 - 32)  SpO2: 99% (21 Dec 2020 10:46) (50% - 100%)    Mode: AC/ CMV (Assist Control/ Continuous Mandatory Ventilation), RR (machine): 32, TV (machine): 370, FiO2: 50, PEEP: 8, ITime: 0.74, MAP: 20, PIP: 43    12-20 @ 07:01  -  12-21 @ 07:00  --------------------------------------------------------  IN: 1071.9 mL / OUT: 1390 mL / NET: -318.1 mL    12-21 @ 07:01  -  12-21 @ 11:05  --------------------------------------------------------  IN: 59.4 mL / OUT: 73 mL / NET: -13.6 mL      CAPILLARY BLOOD GLUCOSE      POCT Blood Glucose.: 147 mg/dL (21 Dec 2020 05:42)    Physical Exam:   HEENT: + PERRLA, EOMI, no drainage or redness  Neck: supple,  No JVD  Respiratory: Trached and Ventilator assisted breath rhonchi bilaterally to auscultation, no accessory muscle use noted  Cardiovascular: Regular rate, regular rhythm, normal S1, S2; no murmurs or rub  Gastrointestinal: obese, soft, non-tender, non distended, no hepatosplenomegaly, normal bowel sounds  Extremities: + 2 peripheral edema, no cyanosis, no clubbing   Vascular: Equal and normal pulses: 2+ peripheral pulses throughout  Neurological: sedated  Skin: DTI to chin area, warm, dry, well perfused        HOSPITAL MEDICATIONS:  Standing Meds:  chlorhexidine 0.12% Liquid 15 milliLiter(s) Oral Mucosa every 12 hours  chlorhexidine 4% Liquid 1 Application(s) Topical <User Schedule>  dexMEDEtomidine Infusion 0.5 MICROgram(s)/kG/Hr IV Continuous <Continuous>  diazepam    Tablet 20 milliGRAM(s) Oral three times a day  heparin  Infusion. 1900 Unit(s)/Hr IV Continuous <Continuous>  imipenem/cilastatin  IVPB 250 milliGRAM(s) IV Intermittent every 12 hours  insulin lispro (ADMELOG) corrective regimen sliding scale   SubCutaneous every 6 hours  insulin NPH human recombinant 10 Unit(s) SubCutaneous every 6 hours  methadone   Solution 30 milliGRAM(s) Oral three times a day  midodrine 30 milliGRAM(s) Oral every 8 hours  pantoprazole  Injectable 40 milliGRAM(s) IV Push daily  polyethylene glycol 3350 17 Gram(s) Oral daily  senna Syrup 15 milliLiter(s) Oral two times a day      PRN Meds:      LABS:                        7.4    11.01 )-----------( 208      ( 21 Dec 2020 03:49 )             24.9     Hgb Trend: 7.4<--, 7.2<--, 7.4<--, 7.8<--, 8.2<--  12-21    135  |  98  |  49<H>  ----------------------------<  124<H>  3.5   |  26  |  1.53<H>    Ca    8.3<L>      21 Dec 2020 03:49  Phos  4.3     12-21  Mg     1.8     12-21    TPro  5.8<L>  /  Alb  2.0<L>  /  TBili  0.6  /  DBili  x   /  AST  14  /  ALT  <5<L>  /  AlkPhos  99  12-21    Creatinine Trend: 1.53<--, 1.61<--, 1.71<--, 1.96<--, 1.97<--, 3.21<--  PT/INR - ( 21 Dec 2020 03:49 )   PT: 15.8 sec;   INR: 1.40 ratio         PTT - ( 21 Dec 2020 03:49 )  PTT:64.3 sec    Arterial Blood Gas:  12-21 @ 03:49  7.35/47/123/25/99.0/0.5  ABG lactate: --  Arterial Blood Gas:  12-20 @ 03:49  7.33/51/98/25/97.9/0.6  ABG lactate: --        MICROBIOLOGY:     RADIOLOGY:  [ ] Reviewed and interpreted by me

## 2020-12-21 NOTE — PROGRESS NOTE ADULT - PROBLEM SELECTOR PROBLEM 1
ARTEMIO (acute kidney injury)

## 2020-12-21 NOTE — PROGRESS NOTE ADULT - SUBJECTIVE AND OBJECTIVE BOX
[facility] DIVISION OF KIDNEY DISEASES AND HYPERTENSION -- FOLLOW UP NOTE  --------------------------------------------------------------------------------  Chief Complaint: ARTEMIO in the setting of COVID-19, on RRT/dialysis.    24 hour events/subjective: Pt. was seen and examined at bedside today in the CTICU. Pt. transferred from [facility] on 12/10/20. Pt. currently intubated and on IV vasopressor to maintain MAP. Pt. underwent last HD on 12/17/20. Pt. remains off diuretics, maintaining good urine output.     PAST HISTORY  --------------------------------------------------------------------------------  No significant changes to PMH, PSH, FHx, SHx, unless otherwise noted    ALLERGIES & MEDICATIONS  --------------------------------------------------------------------------------  Allergies    Kiwi (Unknown)  latex (Anaphylaxis)  latex (Unknown)  penicillin (Other)  penicillin (Unknown)  perfume  hives (Other)  potassium acetate (Other)  soap additives hives (Other)    Intolerances      Standing Inpatient Medications  chlorhexidine 0.12% Liquid 15 milliLiter(s) Oral Mucosa every 12 hours  chlorhexidine 4% Liquid 1 Application(s) Topical <User Schedule>  dexMEDEtomidine Infusion 0.5 MICROgram(s)/kG/Hr IV Continuous <Continuous>  diazepam    Tablet 20 milliGRAM(s) Oral three times a day  fentaNYL   Infusion..... 0.5 MICROgram(s)/kG/Hr IV Continuous <Continuous>  heparin  Infusion. 1900 Unit(s)/Hr IV Continuous <Continuous>  imipenem/cilastatin  IVPB 250 milliGRAM(s) IV Intermittent every 12 hours  insulin lispro (ADMELOG) corrective regimen sliding scale   SubCutaneous every 6 hours  insulin NPH human recombinant 10 Unit(s) SubCutaneous every 6 hours  methadone   Solution 30 milliGRAM(s) Oral three times a day  midodrine 30 milliGRAM(s) Oral every 8 hours  norepinephrine Infusion 0.05 MICROgram(s)/kG/Min IV Continuous <Continuous>  pantoprazole  Injectable 40 milliGRAM(s) IV Push daily  polyethylene glycol 3350 17 Gram(s) Oral daily  senna Syrup 15 milliLiter(s) Oral two times a day    REVIEW OF SYSTEMS  --------------------------------------------------------------------------------  Unable  to obtain due to medical condition    VITALS/PHYSICAL EXAM  --------------------------------------------------------------------------------  T(C): 36.8 (12-21-20 @ 08:00), Max: 37.3 (12-20-20 @ 20:00)  HR: 69 (12-21-20 @ 08:00) (60 - 91)  BP: --  RR: 30 (12-21-20 @ 08:00) (18 - 32)  SpO2: 100% (12-21-20 @ 08:00) (96% - 100%)  Wt(kg): --    12-20-20 @ 07:01  -  12-21-20 @ 07:00  --------------------------------------------------------  IN: 1071.9 mL / OUT: 1390 mL / NET: -318.1 mL    12-21-20 @ 07:01  -  12-21-20 @ 09:20  --------------------------------------------------------  IN: 59.4 mL / OUT: 73 mL / NET: -13.6 mL    Physical Exam:  	(patient examined by inspection given COVID 19  	Gen: trach  	HEENT: trach  	Pulm: on vent  	CV: regular rate  	Ext: trace LE edema B/L  	Neuro: sedated  	Vascular access: Right IJ non-tunneled HD catheter site : No bleeding seen              : marky    LABS/STUDIES  --------------------------------------------------------------------------------              7.4    11.01 >-----------<  208      [12-21-20 @ 03:49]              24.9     135  |  98  |  49  ----------------------------<  124      [12-21-20 @ 03:49]  3.5   |  26  |  1.53        Ca     8.3     [12-21-20 @ 03:49]      Mg     1.8     [12-21-20 @ 03:49]      Phos  4.3     [12-21-20 @ 03:49]    Creatinine Trend:  SCr 1.53 [12-21 @ 03:49]  SCr 1.61 [12-20 @ 03:49]  SCr 1.71 [12-19 @ 04:13]  SCr 1.96 [12-18 @ 09:38]  SCr 1.97 [12-18 @ 02:43]   [facility] DIVISION OF KIDNEY DISEASES AND HYPERTENSION -- FOLLOW UP NOTE  --------------------------------------------------------------------------------  Chief Complaint: ARTEMIO in the setting of COVID-19, on RRT/dialysis.    24 hour events/subjective: Pt. was seen and examined at bedside today in the CTICU. Pt. transferred from [facility] on 12/10/20. Pt. currently intubated and on IV vasopressor to maintain MAP. Pt. underwent last HD on 12/17/20. Pt. remains off diuretics, maintaining good urine output.     PAST HISTORY  --------------------------------------------------------------------------------  No significant changes to PMH, PSH, FHx, SHx, unless otherwise noted    ALLERGIES & MEDICATIONS  --------------------------------------------------------------------------------  Allergies    Kiwi (Unknown)  latex (Anaphylaxis)  latex (Unknown)  penicillin (Other)  penicillin (Unknown)  perfume  hives (Other)  potassium acetate (Other)  soap additives hives (Other)    Intolerances      Standing Inpatient Medications  chlorhexidine 0.12% Liquid 15 milliLiter(s) Oral Mucosa every 12 hours  chlorhexidine 4% Liquid 1 Application(s) Topical <User Schedule>  dexMEDEtomidine Infusion 0.5 MICROgram(s)/kG/Hr IV Continuous <Continuous>  diazepam    Tablet 20 milliGRAM(s) Oral three times a day  fentaNYL   Infusion..... 0.5 MICROgram(s)/kG/Hr IV Continuous <Continuous>  heparin  Infusion. 1900 Unit(s)/Hr IV Continuous <Continuous>  imipenem/cilastatin  IVPB 250 milliGRAM(s) IV Intermittent every 12 hours  insulin lispro (ADMELOG) corrective regimen sliding scale   SubCutaneous every 6 hours  insulin NPH human recombinant 10 Unit(s) SubCutaneous every 6 hours  methadone   Solution 30 milliGRAM(s) Oral three times a day  midodrine 30 milliGRAM(s) Oral every 8 hours  norepinephrine Infusion 0.05 MICROgram(s)/kG/Min IV Continuous <Continuous>  pantoprazole  Injectable 40 milliGRAM(s) IV Push daily  polyethylene glycol 3350 17 Gram(s) Oral daily  senna Syrup 15 milliLiter(s) Oral two times a day    REVIEW OF SYSTEMS  --------------------------------------------------------------------------------  Unable  to obtain due to medical condition    VITALS/PHYSICAL EXAM  --------------------------------------------------------------------------------  T(C): 36.8 (12-21-20 @ 08:00), Max: 37.3 (12-20-20 @ 20:00)  HR: 69 (12-21-20 @ 08:00) (60 - 91)  BP: --  RR: 30 (12-21-20 @ 08:00) (18 - 32)  SpO2: 100% (12-21-20 @ 08:00) (96% - 100%)  Wt(kg): --    12-20-20 @ 07:01  -  12-21-20 @ 07:00  --------------------------------------------------------  IN: 1071.9 mL / OUT: 1390 mL / NET: -318.1 mL    12-21-20 @ 07:01  -  12-21-20 @ 09:20  --------------------------------------------------------  IN: 59.4 mL / OUT: 73 mL / NET: -13.6 mL    Physical Exam:  	(patient examined by inspection given COVID 19  	Gen: trach  	HEENT: trach  	Pulm: on vent  	CV: regular rate  	Ext: trace LE edema B/L  	Neuro: sedated  	Vascular access: Right IJ non-tunneled HD catheter site               : marky    LABS/STUDIES  --------------------------------------------------------------------------------              7.4    11.01 >-----------<  208      [12-21-20 @ 03:49]              24.9     135  |  98  |  49  ----------------------------<  124      [12-21-20 @ 03:49]  3.5   |  26  |  1.53        Ca     8.3     [12-21-20 @ 03:49]      Mg     1.8     [12-21-20 @ 03:49]      Phos  4.3     [12-21-20 @ 03:49]    Creatinine Trend:  SCr 1.53 [12-21 @ 03:49]  SCr 1.61 [12-20 @ 03:49]  SCr 1.71 [12-19 @ 04:13]  SCr 1.96 [12-18 @ 09:38]  SCr 1.97 [12-18 @ 02:43]

## 2020-12-21 NOTE — PROGRESS NOTE ADULT - PROBLEM SELECTOR PLAN 1
Pt. with ARTEMIO in the setting of COVID-19. Pt. with likely ATN. Scr was WNL at 0.53 on 11/23/20 and progressively worsened to 3.0 on 11/27. Patient was initiated on CRRT (at [facility]) on 11/27/20 for oliguric ARTEMIO. CRRT was discontinued on 12/6/20 and transitioned to intermittent HD on 12/8/20. Last HD treatment on 12/17/20. Pt. now with good urine output off diuretics. Pt. with ARTEMIO/ATN in recovery. No furhter plans for HD. Remove HD catheter. Use IV Bumex PRN to maintain euvolemia. Monitor labs and urine output. Avoid any potential nephrotoxins. Dose medications as per eGFR.

## 2020-12-21 NOTE — PROGRESS NOTE ADULT - ASSESSMENT
This is a 60 year old Female w/ pmh of HTN, DVT on Xarelto, peritonitis s/p Oral's procedure and ileostomy (reversed 2011), AARON who presented to [facility] on 11/18  w/ fevers found to have COVID-19, intubated 11/23, course complicated by superimposed PNA and bacteremia with Stenotrophomonas (12/11, completed Levaquin x7 days), MSSA/P. mirablis bacteremia (on Cefazolin, potentially due to urine vs line-associated and MSSA in sputum due to PNA), also with lung abscesses on CT chest on 12/5 , ARTEMIO/ATN requiring CRRT and HD. Now unable to wean from ventilator and transferred from [facility] on 12/10 to offload COVID unit. Unable to wean from ventilator, s/p tracheostmy with ctsx 12/18 but family did not consent to PEG      #Neuro  Alert & oriented x3 @baseline.   - remains on Precedex and fentanyl for sedation  -will continue to come down on fentanyl if tolerated and c/w methadone and valium   -CTH negative   - last ammonia level was 66     #Resp  Acute hypoxemic respiratory failure 2/2 covid-19 PNA, intubated 11/23. Course complicated by MSSA pneumonia, stenotrophomonas, p. mirabalis, and lung abscess on CT chest from 12/5. Unable to wean from ventilator now trached by ctx on 12/18   - c/w AC 30/370/8/40% will attempt PS trial after sedation is lowered and as tolerated   - Trached by CT surg 12/18. CT surg to remove trach sutures on 1/1/21     #CV  Intermittently requiring norepinephrine most likely from vasoplegic shock from sedation medications   - will increase midodrine to 30mg TID   - currently off pressors  - heparin gtt restarted post procedure  - CT negative for PE    #GI  - Continue TF, tolerating well  - Continue bowel regimen  -c/w PPI     #  ARTEMIO 2/2 ATN requiring CRRT, then Bumex challenge and now intermittent HD, finished trial with Bumex yesterday with okay response. Started on Bumex gtt now making good UO. Bumex gtt d/c'ed.   -last HD 12/17, Bumex gtt d/c'ed  -renal following   - will trend electrolytes and replete as necessary  - Rivera in place  -strict I’s and O’s  -will assess need for prn bumex     #ID  Covid 19 pneumonia, MSSA bacteremia/pneumonia, now cavitary consolidation on CT chest.  - s/p remdesivir and dexamethasone   - + Sputum/blood MSSA, + P mirablis in blood, +Stenotrophomonas in sputum   - Was previously on Cefazolin for MSSA bacteremia. Pt continues with fevers, ABX broaden to Fadia and Vanco on 12/11  - s/p Levofloxacin   - caspofungin discontinued, no fungal growth.   - Blood cx from 12/7 NGTD. 12/12 sputum with gram negative rods now speciated to Stenotrophomonas   -will D/C meropenem (12/11-12/16)   - ID following  -c/w imipenem (12/16- 12/28) 250mg q12h x 2 weeks for cavitation and MSSA, may need to adjust dose if not requiring intermittent HD      #Heme  Hx DVT on xarelto. Anemic 12/13 with no evidence of bleeding s/p 1u PRBC   -Anticoagulation with Heparin gtt   -continue to trend CBC/ PTT     #Endo  - ISS q6hrs  - NPH q6hrs   This is a 60 year old Female w/ pmh of HTN, DVT on Xarelto, peritonitis s/p Oral's procedure and ileostomy (reversed 2011), AARON who presented to [facility] on 11/18  w/ fevers found to have COVID-19, intubated 11/23, course complicated by superimposed PNA and bacteremia with Stenotrophomonas (12/11, completed Levaquin x7 days), MSSA/P. mirablis bacteremia (on Cefazolin, potentially due to urine vs line-associated and MSSA in sputum due to PNA), also with lung abscesses on CT chest on 12/5 , ARTEMIO/ATN requiring CRRT and HD. Now unable to wean from ventilator and transferred from [facility] on 12/10 to offload COVID unit. Unable to wean from ventilator, s/p tracheostmy with ctsx 12/18 but family did not consent to PEG      #Neuro  Alert & oriented x3 @baseline.   - remains on Precedex, fentanyl titrated off   - c/w methadone and valium to assist on coming off of IV sedation    -CTH negative   - last ammonia level was 66     #Resp  Acute hypoxemic respiratory failure 2/2 covid-19 PNA, intubated 11/23. Course complicated by MSSA pneumonia, stenotrophomonas, p. mirabalis, and lung abscess on CT chest from 12/5. Unable to wean from ventilator now trached by ctx on 12/18. Now with significant positional air leak from tracheotomy    - c/w AC 30/370/8/40%  did not tolerate PS trial   - Trached by CT surg 12/18. CT surg to remove trach sutures on 1/1/21     #CV  Hx of DVT on xarelto. Intermittently requiring norepinephrine most likely from vasoplegic shock from sedation medications   - will increase midodrine to 30mg TID   - currently off pressors  - CT negative for PE  -c/w heparin gtt    #GI  - Continue TF, tolerating well  - Continue bowel regimen  -c/w PPI     #  ARTEMIO 2/2 ATN requiring CRRT, then Bumex challenge and now intermittent HD, finished trial with Bumex yesterday with okay response. Started on Bumex gtt now making good UO. Bumex gtt d/c'ed.   -last HD 12/17, Bumex gtt d/c'ed  -renal following   - will trend electrolytes and replete as necessary  - Rivera in place  -strict I’s and O’s  -will assess need for prn bumex     #ID  Covid 19 pneumonia, MSSA bacteremia/pneumonia, now cavitary consolidation on CT chest.  - s/p remdesivir and dexamethasone   - + Sputum/blood MSSA, + P mirablis in blood, +Stenotrophomonas in sputum   - Was previously on Cefazolin for MSSA bacteremia. Pt continues with fevers, ABX broaden to Fadia and Vanco on 12/11  - s/p Levofloxacin   - caspofungin discontinued, no fungal growth.   - Blood cx from 12/7 NGTD. 12/12 sputum with gram negative rods now speciated to Stenotrophomonas   -will D/C meropenem (12/11-12/16)   - ID following  -c/w imipenem (12/16- 12/28) 250mg q12h x 2 weeks for cavitation and MSSA, may need to adjust dose if not requiring intermittent HD      #Heme  Hx DVT on xarelto. Anemic 12/13 with no evidence of bleeding s/p 1u PRBC   -Anticoagulation with Heparin gtt   -continue to trend CBC/ PTT     #Endo  - ISS q6hrs  - NPH q6hrs

## 2020-12-21 NOTE — ADVANCED PRACTICE NURSE CONSULT - ASSESSMENT
Intubated (ETT) and sedated, bedbound, indwelling urethral catheter in place and incontinent of stool (incontinent during assessment, care provided and linen changed). OGT in place. Skin warm, dry with increased moisture in intertriginous folds, adequate skin turgor. Blanchable erythema on bilateral heels. Right cheek hypopigmentation.    Risk for moisture associated dermatitis of intertriginous folds: bilateral breast, pannus and bilateral groin with hyperpigmentation secondary to moisture exposure- no suspicion of candidiasis, skin intact, no erythema noted.    Chin extending to right mandible, Unstageable Pressure Injury entire area measures [id number], area of eschar on chin measures [id number] (unable to determine complete anatomical depth due to necrotic tissue). Borders are irregular. Within this entire area there is 50% hard, stable eschar that is firmly attached to wound edge (noted area measured previously) 50% hyperpigmentation. No induration, no increased warmth, no erythema in periwound skin (no signs of soft tissue infection). No drainage noted. Goal of care: monitor for changes in tissue type, maintain stable eschar at this time (no signs of soft tissue infection and patient critically ill, skin may not heal at this time).    Left cheek, Unstageable Pressure Injury measures [id number]. 100% hard, stable eschar that is firmly attached to wound base and wound edges. Periwound skin circumferential hypopigmentation, no induration, no increased warmth, no erythema. No active drainage noted. Goal of care: maintain stable eschar at this time.    Sacral fold to bilateral buttock: mixed etiology acute skin changes related to COVID-19/acute skin changes in the critically ill patient, unstagable pressure injury complicated by fecal incontinence-     full note to follow   Intubated (ETT) and sedated, bedbound, indwelling urethral catheter in place and incontinent of stool (incontinent during assessment, care provided and linen changed). OGT in place. Skin warm, dry with increased moisture in intertriginous folds, adequate skin turgor. Blanchable erythema on bilateral heels. Right cheek hypopigmentation.    Risk for moisture associated dermatitis of intertriginous folds: bilateral breast, pannus and bilateral groin with hyperpigmentation secondary to moisture exposure- no suspicion of candidiasis, skin intact, no erythema noted.    Chin extending to right mandible, Unstageable Pressure Injury entire area measures [id number], area of eschar on chin measures [id number] (unable to determine complete anatomical depth due to necrotic tissue). Borders are irregular. Within this entire area there is 50% hard, stable eschar that is firmly attached to wound edge (noted area measured previously) 50% hyperpigmentation. No induration, no increased warmth, no erythema in periwound skin (no signs of soft tissue infection). No drainage noted. Goal of care: monitor for changes in tissue type, maintain stable eschar at this time (no signs of soft tissue infection and patient critically ill, skin may not heal at this time).    Left cheek, Unstageable Pressure Injury measures [id number]. 100% hard, stable eschar that is firmly attached to wound base and wound edges. Periwound skin circumferential hypopigmentation, no induration, no increased warmth, no erythema. No active drainage noted. Goal of care: maintain stable eschar at this time.    Sacral fold to bilateral buttock (predominately on left buttock): mixed etiology acute skin changes related to COVID-19/acute skin changes in the critically ill patient, Unstageable pressure injury complicated by fecal incontinence. When patient is in natural anatomical position part of the injury is not visible. Entire area tissue type 20% soft, black eschar that is firmly attached to left buttock in linear pattern, 60% red, moist dermis and 20% hyperpigmentation with thin/fragile macerated skin in sacral fold. Periwound skin with no induration, no increased warmth, no erythema. Small amount of serous drainage noted from exposed dermis. Goal of care: hydrophilic protection from moisture and incontinence, monitor for changes in tissue type of eschar on left buttock.

## 2020-12-21 NOTE — ADVANCED PRACTICE NURSE CONSULT - RECOMMEDATIONS
Chin and Left cheek: Apply Betadine, allow to dry. Daily.    Sacral fold to bilateral buttock: Cleanse with skin cleanser, pat dry. Apply TRIAD paste twice a day and PRN.    Intertriginous folds: Place Interdry textile sheeting, remove to wash & dry affected area, then replace. Individual sheeting may be used for up to 5 days unless soiled.     Continue low air loss bed therapy, continue heel elevation, continue to turn & reposition q2h, continue moisture management with barrier creams & single breathable pad, continue measures to decrease friction/shear/pressure. Continue with nutritional support as per dietary/orders.     Please call wound care service line is further assistance is needed ([phone]).

## 2020-12-22 LAB
ALBUMIN SERPL ELPH-MCNC: 1.9 G/DL — LOW (ref 3.3–5)
ALP SERPL-CCNC: 91 U/L — SIGNIFICANT CHANGE UP (ref 40–120)
ALT FLD-CCNC: <5 U/L — LOW (ref 4–33)
ANION GAP SERPL CALC-SCNC: 10 MMOL/L — SIGNIFICANT CHANGE UP (ref 7–14)
APTT BLD: 69.4 SEC — HIGH (ref 27–36.3)
AST SERPL-CCNC: 13 U/L — SIGNIFICANT CHANGE UP (ref 4–32)
BASOPHILS # BLD AUTO: 0 K/UL — SIGNIFICANT CHANGE UP (ref 0–0.2)
BASOPHILS # BLD AUTO: 0.1 K/UL — SIGNIFICANT CHANGE UP (ref 0–0.2)
BASOPHILS NFR BLD AUTO: 0 % — SIGNIFICANT CHANGE UP (ref 0–2)
BASOPHILS NFR BLD AUTO: 0.7 % — SIGNIFICANT CHANGE UP (ref 0–2)
BILIRUB SERPL-MCNC: 0.6 MG/DL — SIGNIFICANT CHANGE UP (ref 0.2–1.2)
BLD GP AB SCN SERPL QL: NEGATIVE — SIGNIFICANT CHANGE UP
BLOOD GAS ARTERIAL - LYTES,HGB,ICA,LACT RESULT: SIGNIFICANT CHANGE UP
BUN SERPL-MCNC: 48 MG/DL — HIGH (ref 7–23)
CALCIUM SERPL-MCNC: 8.3 MG/DL — LOW (ref 8.4–10.5)
CHLORIDE SERPL-SCNC: 99 MMOL/L — SIGNIFICANT CHANGE UP (ref 98–107)
CO2 SERPL-SCNC: 29 MMOL/L — SIGNIFICANT CHANGE UP (ref 22–31)
CREAT SERPL-MCNC: 1.34 MG/DL — HIGH (ref 0.5–1.3)
EOSINOPHIL # BLD AUTO: 0 K/UL — SIGNIFICANT CHANGE UP (ref 0–0.5)
EOSINOPHIL # BLD AUTO: 0.16 K/UL — SIGNIFICANT CHANGE UP (ref 0–0.5)
EOSINOPHIL NFR BLD AUTO: 0 % — SIGNIFICANT CHANGE UP (ref 0–6)
EOSINOPHIL NFR BLD AUTO: 1.2 % — SIGNIFICANT CHANGE UP (ref 0–6)
GLUCOSE BLDC GLUCOMTR-MCNC: 107 MG/DL — HIGH (ref 70–99)
GLUCOSE BLDC GLUCOMTR-MCNC: 112 MG/DL — HIGH (ref 70–99)
GLUCOSE BLDC GLUCOMTR-MCNC: 123 MG/DL — HIGH (ref 70–99)
GLUCOSE BLDC GLUCOMTR-MCNC: 90 MG/DL — SIGNIFICANT CHANGE UP (ref 70–99)
GLUCOSE SERPL-MCNC: 118 MG/DL — HIGH (ref 70–99)
HCT VFR BLD CALC: 23 % — LOW (ref 34.5–45)
HCT VFR BLD CALC: 23.6 % — LOW (ref 34.5–45)
HCT VFR BLD CALC: 23.8 % — LOW (ref 34.5–45)
HGB BLD-MCNC: 6.7 G/DL — CRITICAL LOW (ref 11.5–15.5)
HGB BLD-MCNC: 7 G/DL — CRITICAL LOW (ref 11.5–15.5)
HGB BLD-MCNC: 7 G/DL — CRITICAL LOW (ref 11.5–15.5)
IANC: 7.99 K/UL — SIGNIFICANT CHANGE UP (ref 1.5–8.5)
IANC: 8.54 K/UL — HIGH (ref 1.5–8.5)
IMM GRANULOCYTES NFR BLD AUTO: 11.2 % — HIGH (ref 0–1.5)
INR BLD: 1.24 RATIO — HIGH (ref 0.88–1.17)
LYMPHOCYTES # BLD AUTO: 0.33 K/UL — LOW (ref 1–3.3)
LYMPHOCYTES # BLD AUTO: 1.83 K/UL — SIGNIFICANT CHANGE UP (ref 1–3.3)
LYMPHOCYTES # BLD AUTO: 13.7 % — SIGNIFICANT CHANGE UP (ref 13–44)
LYMPHOCYTES # BLD AUTO: 2.7 % — LOW (ref 13–44)
MAGNESIUM SERPL-MCNC: 1.5 MG/DL — LOW (ref 1.6–2.6)
MCHC RBC-ENTMCNC: 28.2 PG — SIGNIFICANT CHANGE UP (ref 27–34)
MCHC RBC-ENTMCNC: 28.9 PG — SIGNIFICANT CHANGE UP (ref 27–34)
MCHC RBC-ENTMCNC: 29 PG — SIGNIFICANT CHANGE UP (ref 27–34)
MCHC RBC-ENTMCNC: 29.1 GM/DL — LOW (ref 32–36)
MCHC RBC-ENTMCNC: 29.4 GM/DL — LOW (ref 32–36)
MCHC RBC-ENTMCNC: 29.7 GM/DL — LOW (ref 32–36)
MCV RBC AUTO: 96.6 FL — SIGNIFICANT CHANGE UP (ref 80–100)
MCV RBC AUTO: 97.9 FL — SIGNIFICANT CHANGE UP (ref 80–100)
MCV RBC AUTO: 98.3 FL — SIGNIFICANT CHANGE UP (ref 80–100)
MONOCYTES # BLD AUTO: 0.87 K/UL — SIGNIFICANT CHANGE UP (ref 0–0.9)
MONOCYTES # BLD AUTO: 1.22 K/UL — HIGH (ref 0–0.9)
MONOCYTES NFR BLD AUTO: 7.2 % — SIGNIFICANT CHANGE UP (ref 2–14)
MONOCYTES NFR BLD AUTO: 9.1 % — SIGNIFICANT CHANGE UP (ref 2–14)
NEUTROPHILS # BLD AUTO: 8.54 K/UL — HIGH (ref 1.8–7.4)
NEUTROPHILS # BLD AUTO: 9.83 K/UL — HIGH (ref 1.8–7.4)
NEUTROPHILS NFR BLD AUTO: 64.1 % — SIGNIFICANT CHANGE UP (ref 43–77)
NEUTROPHILS NFR BLD AUTO: 70.3 % — SIGNIFICANT CHANGE UP (ref 43–77)
NRBC # BLD: 2 /100 WBCS — SIGNIFICANT CHANGE UP
NRBC # BLD: 2 /100 WBCS — SIGNIFICANT CHANGE UP
NRBC # FLD: 0.21 K/UL — HIGH
NRBC # FLD: 0.24 K/UL — HIGH
PHOSPHATE SERPL-MCNC: 4.6 MG/DL — HIGH (ref 2.5–4.5)
PLATELET # BLD AUTO: 249 K/UL — SIGNIFICANT CHANGE UP (ref 150–400)
PLATELET # BLD AUTO: 253 K/UL — SIGNIFICANT CHANGE UP (ref 150–400)
PLATELET # BLD AUTO: 261 K/UL — SIGNIFICANT CHANGE UP (ref 150–400)
POTASSIUM SERPL-MCNC: 3.4 MMOL/L — LOW (ref 3.5–5.3)
POTASSIUM SERPL-SCNC: 3.4 MMOL/L — LOW (ref 3.5–5.3)
PROT SERPL-MCNC: 5.8 G/DL — LOW (ref 6–8.3)
PROTHROM AB SERPL-ACNC: 14 SEC — HIGH (ref 9.8–13.1)
RBC # BLD: 2.38 M/UL — LOW (ref 3.8–5.2)
RBC # BLD: 2.41 M/UL — LOW (ref 3.8–5.2)
RBC # BLD: 2.42 M/UL — LOW (ref 3.8–5.2)
RBC # FLD: 21.6 % — HIGH (ref 10.3–14.5)
RBC # FLD: 21.6 % — HIGH (ref 10.3–14.5)
RBC # FLD: 21.7 % — HIGH (ref 10.3–14.5)
RH IG SCN BLD-IMP: POSITIVE — SIGNIFICANT CHANGE UP
SODIUM SERPL-SCNC: 138 MMOL/L — SIGNIFICANT CHANGE UP (ref 135–145)
VANCOMYCIN FLD-MCNC: 3.1 UG/ML — SIGNIFICANT CHANGE UP
WBC # BLD: 12.12 K/UL — HIGH (ref 3.8–10.5)
WBC # BLD: 13.35 K/UL — HIGH (ref 3.8–10.5)
WBC # BLD: 13.57 K/UL — HIGH (ref 3.8–10.5)
WBC # FLD AUTO: 12.12 K/UL — HIGH (ref 3.8–10.5)
WBC # FLD AUTO: 13.35 K/UL — HIGH (ref 3.8–10.5)
WBC # FLD AUTO: 13.57 K/UL — HIGH (ref 3.8–10.5)

## 2020-12-22 PROCEDURE — 99232 SBSQ HOSP IP/OBS MODERATE 35: CPT

## 2020-12-22 PROCEDURE — 99291 CRITICAL CARE FIRST HOUR: CPT

## 2020-12-22 RX ORDER — IMIPENEM AND CILASTATIN 250; 250 MG/100ML; MG/100ML
500 INJECTION, POWDER, FOR SOLUTION INTRAVENOUS EVERY 12 HOURS
Refills: 0 | Status: DISCONTINUED | OUTPATIENT
Start: 2020-12-23 | End: 2020-12-28

## 2020-12-22 RX ORDER — DIAZEPAM 5 MG
30 TABLET ORAL THREE TIMES A DAY
Refills: 0 | Status: DISCONTINUED | OUTPATIENT
Start: 2020-12-22 | End: 2020-12-23

## 2020-12-22 RX ORDER — POTASSIUM CHLORIDE 20 MEQ
40 PACKET (EA) ORAL ONCE
Refills: 0 | Status: COMPLETED | OUTPATIENT
Start: 2020-12-22 | End: 2020-12-22

## 2020-12-22 RX ORDER — MAGNESIUM SULFATE 500 MG/ML
2 VIAL (ML) INJECTION ONCE
Refills: 0 | Status: COMPLETED | OUTPATIENT
Start: 2020-12-22 | End: 2020-12-22

## 2020-12-22 RX ADMIN — MIDODRINE HYDROCHLORIDE 30 MILLIGRAM(S): 2.5 TABLET ORAL at 05:10

## 2020-12-22 RX ADMIN — Medication 50 GRAM(S): at 09:44

## 2020-12-22 RX ADMIN — HUMAN INSULIN 10 UNIT(S): 100 INJECTION, SUSPENSION SUBCUTANEOUS at 11:28

## 2020-12-22 RX ADMIN — CHLORHEXIDINE GLUCONATE 15 MILLILITER(S): 213 SOLUTION TOPICAL at 17:19

## 2020-12-22 RX ADMIN — POLYETHYLENE GLYCOL 3350 17 GRAM(S): 17 POWDER, FOR SOLUTION ORAL at 11:28

## 2020-12-22 RX ADMIN — HUMAN INSULIN 10 UNIT(S): 100 INJECTION, SUSPENSION SUBCUTANEOUS at 23:31

## 2020-12-22 RX ADMIN — CHLORHEXIDINE GLUCONATE 1 APPLICATION(S): 213 SOLUTION TOPICAL at 06:18

## 2020-12-22 RX ADMIN — METHADONE HYDROCHLORIDE 30 MILLIGRAM(S): 40 TABLET ORAL at 14:53

## 2020-12-22 RX ADMIN — IMIPENEM AND CILASTATIN 200 MILLIGRAM(S): 250; 250 INJECTION, POWDER, FOR SOLUTION INTRAVENOUS at 02:12

## 2020-12-22 RX ADMIN — MIDODRINE HYDROCHLORIDE 30 MILLIGRAM(S): 2.5 TABLET ORAL at 22:12

## 2020-12-22 RX ADMIN — MIDODRINE HYDROCHLORIDE 30 MILLIGRAM(S): 2.5 TABLET ORAL at 14:54

## 2020-12-22 RX ADMIN — Medication 20 MILLIGRAM(S): at 05:44

## 2020-12-22 RX ADMIN — PANTOPRAZOLE SODIUM 40 MILLIGRAM(S): 20 TABLET, DELAYED RELEASE ORAL at 11:30

## 2020-12-22 RX ADMIN — SENNA PLUS 15 MILLILITER(S): 8.6 TABLET ORAL at 05:09

## 2020-12-22 RX ADMIN — IMIPENEM AND CILASTATIN 200 MILLIGRAM(S): 250; 250 INJECTION, POWDER, FOR SOLUTION INTRAVENOUS at 14:53

## 2020-12-22 RX ADMIN — HUMAN INSULIN 10 UNIT(S): 100 INJECTION, SUSPENSION SUBCUTANEOUS at 05:11

## 2020-12-22 RX ADMIN — METHADONE HYDROCHLORIDE 30 MILLIGRAM(S): 40 TABLET ORAL at 05:09

## 2020-12-22 RX ADMIN — Medication 40 MILLIEQUIVALENT(S): at 09:44

## 2020-12-22 RX ADMIN — HUMAN INSULIN 10 UNIT(S): 100 INJECTION, SUSPENSION SUBCUTANEOUS at 17:20

## 2020-12-22 RX ADMIN — METHADONE HYDROCHLORIDE 30 MILLIGRAM(S): 40 TABLET ORAL at 22:12

## 2020-12-22 RX ADMIN — CHLORHEXIDINE GLUCONATE 15 MILLILITER(S): 213 SOLUTION TOPICAL at 06:18

## 2020-12-22 RX ADMIN — Medication 30 MILLIGRAM(S): at 22:12

## 2020-12-22 RX ADMIN — Medication 30 MILLIGRAM(S): at 14:53

## 2020-12-22 NOTE — PROGRESS NOTE ADULT - SUBJECTIVE AND OBJECTIVE BOX
Follow Up:      Inverval History/ROS:Patient is a 60y old  Female who presents with a chief complaint of Transfer from [facility] (22 Dec 2020 08:13)    trach  On vent      Allergies    Kiwi (Unknown)  latex (Anaphylaxis)  latex (Unknown)  penicillin (Other)  penicillin (Unknown)  perfume  hives (Other)  potassium acetate (Other)  soap additives hives (Other)    Intolerances        ANTIMICROBIALS:  imipenem/cilastatin  IVPB 250 every 12 hours      OTHER MEDS:  chlorhexidine 0.12% Liquid 15 milliLiter(s) Oral Mucosa every 12 hours  chlorhexidine 4% Liquid 1 Application(s) Topical <User Schedule>  dexMEDEtomidine Infusion 0.5 MICROgram(s)/kG/Hr IV Continuous <Continuous>  diazepam    Tablet 30 milliGRAM(s) Oral three times a day  heparin  Infusion. 1900 Unit(s)/Hr IV Continuous <Continuous>  insulin lispro (ADMELOG) corrective regimen sliding scale   SubCutaneous every 6 hours  insulin NPH human recombinant 10 Unit(s) SubCutaneous every 6 hours  methadone   Solution 30 milliGRAM(s) Oral three times a day  midodrine 30 milliGRAM(s) Oral every 8 hours  pantoprazole  Injectable 40 milliGRAM(s) IV Push daily  polyethylene glycol 3350 17 Gram(s) Oral daily  senna Syrup 15 milliLiter(s) Oral two times a day      Vital Signs Last 24 Hrs  T(C): 37.2 (22 Dec 2020 12:00), Max: 38.4 (21 Dec 2020 16:00)  T(F): 99 (22 Dec 2020 12:00), Max: 101.2 (21 Dec 2020 16:00)  HR: 87 (22 Dec 2020 13:00) (79 - 112)  BP: --  BP(mean): --  RR: 32 (22 Dec 2020 06:00) (32 - 40)  SpO2: 99% (22 Dec 2020 13:00) (97% - 100%)    PHYSICAL EXAM:  General: [x ] trach/ on vent    HEAD/EYES: [ ] PERRL [x ] white sclera [ ] icterus  ENT:  [x ] normal [ ] supple [ ] thrush [ ] pharyngeal exudate  Cardiovascular:   [ ] murmur [x ] normal [ ] PPM/AICD  Respiratory:  [ x] clear to ausculation bilaterally  GI:  [x ] soft, non-tender, normal bowel sounds  :  [ ] negro [x ] no CVA tenderness   Musculoskeletal:  [ ] no synovitis  Neurologic:  [ ] non-focal exam   Skin:  x[ ] no rash  Lymph: [ ] no lymphadenopathy  Psychiatric:  [ ] appropriate affect [ ] alert & oriented  Lines:  [ x] no phlebitis [ ] central line  x                              7.0    13.57 )-----------( 261      ( 22 Dec 2020 03:14 )             23.8       12    138  |  99  |  48<H>  ----------------------------<  118<H>  3.4<L>   |  29  |  1.34<H>    Ca    8.3<L>      22 Dec 2020 03:14  Phos  4.6       Mg     1.5         TPro  5.8<L>  /  Alb  1.9<L>  /  TBili  0.6  /  DBili  x   /  AST  13  /  ALT  <5<L>  /  AlkPhos  91        Urinalysis Basic - ( 21 Dec 2020 17:46 )    Color: Yellow / Appearance: Slightly Turbid / S.009 / pH: x  Gluc: x / Ketone: Negative  / Bili: Negative / Urobili: 6 mg/dL   Blood: x / Protein: Trace / Nitrite: Negative   Leuk Esterase: Small / RBC: 3-5 /HPF / WBC 6-10 /HPF   Sq Epi: x / Non Sq Epi: Few / Bacteria: Few        MICROBIOLOGY:    RADIOLOGY:

## 2020-12-22 NOTE — PROGRESS NOTE ADULT - ATTENDING COMMENTS
60 F who presented to [facility] 11/18 (transferred to [facility] 12/10) with dyspnea. Tested positive for COVID-19+ 11/17 and intubated 11/23 for acute hypoxemic respiratory failure /  ARDS. Course c/b lung abscess, MSSA bacteremia, and stenotrophomonas in sputum. Currently on imipenem/cilastatin. Also developed an ARTEMIO requiring HD. Urine output improving off diuretics and creatinine improving. HD catheter was pulled 12/21. She is s/p tracheostomy 12/18 and tentatively scheduled for PEG later this week. Hgb noted to be < 7 today and she is ordered for 1 unit pRBC. There is no obvious source of bleeding and she is hemodynamically stable. Suspect AOCD in setting of critical illness and renal failure. Hold A/C for now and recheck Hgb post transfusion. Continue supportive care. Remainder of plan as above.

## 2020-12-22 NOTE — PROGRESS NOTE ADULT - SUBJECTIVE AND OBJECTIVE BOX
Interval Events:   -significant air leak from tracheostomy   -dop in Hgb overnight requiring 1u PRBC   -remains off all sedation     HPI:  Patient is a 59yo F w/ AARON, peritonitis s/p Oral's procedure and ileostomy (reversed ), HTN, prior DVT/PE, ?Asthma admitted on 2020 to [facility] after presenting for  productive cough  w/ SOB x9 days and diarrhea x7 days and fever x1 day. Patient was found to be COVID + on 2020 and completed remdesivir -. Patient was intubated for airway protection on 2020. Patient has required proning (last 2020). Hospital course c/b by ATN, requiring CVVHD now on intermittent HD, unresponsive to bumex challenge last HD . Course also c/b Stenotrophomonas (, completed Levaquin x7 days), MSSA/P. mirablis bacteremia (on Cefazolin, potentially due to urine vs line-associated and MSSA in sputum due to PNA), also with lung abscesses on CT chest on .  Patient currently on Volume AC 30/350/8/35% on Precedex. Patient intermittently opens eyes but is not yet responsive. Transferred over to [facility] on 12/10 to offload [facility] COVID ICU    (10 Dec 2020 14:13)      REVIEW OF SYSTEMS:      [x ] Unable to assess ROS because patient is trached/unresponsive    OBJECTIVE:  ICU Vital Signs Last 24 Hrs  T(C): 37.2 (22 Dec 2020 08:00), Max: 38.4 (21 Dec 2020 16:00)  T(F): 98.9 (22 Dec 2020 08:00), Max: 101.2 (21 Dec 2020 16:00)  HR: 96 (22 Dec 2020 11:00) (70 - 112)  BP: --  BP(mean): --  ABP: 120/54 (22 Dec 2020 11:00) (104/43 - 166/57)  ABP(mean): 83 (22 Dec 2020 10:00) (64 - 95)  RR: 32 (22 Dec 2020 06:00) (32 - 40)  SpO2: 99% (22 Dec 2020 11:00) (97% - 100%)    Mode: AC/ CMV (Assist Control/ Continuous Mandatory Ventilation), RR (machine): 32, TV (machine): 370, FiO2: 50, PEEP: 8, ITime: 0.66, MAP: 18, PIP: 32     @ 07:  -   @ 07:00  --------------------------------------------------------  IN: 1636.3 mL / OUT: 2583 mL / NET: -946.7 mL     @ 07: @ 11:38  --------------------------------------------------------  IN: 198 mL / OUT: 105 mL / NET: 93 mL      CAPILLARY BLOOD GLUCOSE      POCT Blood Glucose.: 123 mg/dL (22 Dec 2020 11:09)    Physical Exam:   HEENT: + PERRLA, EOMI, no drainage or redness  Neck: supple,  No JVD  Respiratory: Trached and Ventilator assisted breath rhonchi bilaterally to auscultation, no accessory muscle use noted  Cardiovascular: Regular rate, regular rhythm, normal S1, S2; no murmurs or rub  Gastrointestinal: obese, soft, non-tender, non distended, no hepatosplenomegaly, normal bowel sounds  Extremities: + 2 peripheral edema, no cyanosis, no clubbing   Vascular: Equal and normal pulses: 2+ peripheral pulses throughout  Neurological: sedated  Skin: DTI to chin area, warm, dry, well perfused    HOSPITAL MEDICATIONS:  Standing Meds:  chlorhexidine 0.12% Liquid 15 milliLiter(s) Oral Mucosa every 12 hours  chlorhexidine 4% Liquid 1 Application(s) Topical <User Schedule>  dexMEDEtomidine Infusion 0.5 MICROgram(s)/kG/Hr IV Continuous <Continuous>  diazepam    Tablet 30 milliGRAM(s) Oral three times a day  heparin  Infusion. 1900 Unit(s)/Hr IV Continuous <Continuous>  imipenem/cilastatin  IVPB 250 milliGRAM(s) IV Intermittent every 12 hours  insulin lispro (ADMELOG) corrective regimen sliding scale   SubCutaneous every 6 hours  insulin NPH human recombinant 10 Unit(s) SubCutaneous every 6 hours  methadone   Solution 30 milliGRAM(s) Oral three times a day  midodrine 30 milliGRAM(s) Oral every 8 hours  pantoprazole  Injectable 40 milliGRAM(s) IV Push daily  polyethylene glycol 3350 17 Gram(s) Oral daily  senna Syrup 15 milliLiter(s) Oral two times a day      PRN Meds:      LABS:                        7.0    13.57 )-----------( 261      ( 22 Dec 2020 03:14 )             23.8     Hgb Trend: 7.0<--, 7.4<--, 7.2<--, 7.4<--, 7.8<--      138  |  99  |  48<H>  ----------------------------<  118<H>  3.4<L>   |  29  |  1.34<H>    Ca    8.3<L>      22 Dec 2020 03:14  Phos  4.6       Mg     1.5         TPro  5.8<L>  /  Alb  1.9<L>  /  TBili  0.6  /  DBili  x   /  AST  13  /  ALT  <5<L>  /  AlkPhos  91      Creatinine Trend: 1.34<--, 1.53<--, 1.61<--, 1.71<--, 1.96<--, 1.97<--  PT/INR - ( 22 Dec 2020 03:14 )   PT: 14.0 sec;   INR: 1.24 ratio         PTT - ( 22 Dec 2020 03:14 )  PTT:69.4 sec  Urinalysis Basic - ( 21 Dec 2020 17:46 )    Color: Yellow / Appearance: Slightly Turbid / S.009 / pH: x  Gluc: x / Ketone: Negative  / Bili: Negative / Urobili: 6 mg/dL   Blood: x / Protein: Trace / Nitrite: Negative   Leuk Esterase: Small / RBC: 3-5 /HPF / WBC 6-10 /HPF   Sq Epi: x / Non Sq Epi: Few / Bacteria: Few      Arterial Blood Gas:   @ 03:14  7.34/55/112/28/98.3/3.9  ABG lactate: --  Arterial Blood Gas:   @ 03:49  7.35/47/123/25/99.0/0.5  ABG lactate: --        MICROBIOLOGY:     RADIOLOGY:  [ ] Reviewed and interpreted by me       Interval Events:   -significant air leak from tracheostomy   -dop in Hgb overnight   -remains off all sedation     HPI:  Patient is a 61yo F w/ AARON, peritonitis s/p Oral's procedure and ileostomy (reversed ), HTN, prior DVT/PE, ?Asthma admitted on 2020 to [facility] after presenting for  productive cough  w/ SOB x9 days and diarrhea x7 days and fever x1 day. Patient was found to be COVID + on 2020 and completed remdesivir -. Patient was intubated for airway protection on 2020. Patient has required proning (last 2020). Hospital course c/b by ATN, requiring CVVHD now on intermittent HD, unresponsive to bumex challenge last HD . Course also c/b Stenotrophomonas (, completed Levaquin x7 days), MSSA/P. mirablis bacteremia (on Cefazolin, potentially due to urine vs line-associated and MSSA in sputum due to PNA), also with lung abscesses on CT chest on .  Patient currently on Volume AC 30/350/8/35% on Precedex. Patient intermittently opens eyes but is not yet responsive. Transferred over to [facility] on 12/10 to offload [facility] COVID ICU    (10 Dec 2020 14:13)      REVIEW OF SYSTEMS:      [x ] Unable to assess ROS because patient is trached/unresponsive    OBJECTIVE:  ICU Vital Signs Last 24 Hrs  T(C): 37.2 (22 Dec 2020 08:00), Max: 38.4 (21 Dec 2020 16:00)  T(F): 98.9 (22 Dec 2020 08:00), Max: 101.2 (21 Dec 2020 16:00)  HR: 96 (22 Dec 2020 11:00) (70 - 112)  BP: --  BP(mean): --  ABP: 120/54 (22 Dec 2020 11:00) (104/43 - 166/57)  ABP(mean): 83 (22 Dec 2020 10:00) (64 - 95)  RR: 32 (22 Dec 2020 06:00) (32 - 40)  SpO2: 99% (22 Dec 2020 11:00) (97% - 100%)    Mode: AC/ CMV (Assist Control/ Continuous Mandatory Ventilation), RR (machine): 32, TV (machine): 370, FiO2: 50, PEEP: 8, ITime: 0.66, MAP: 18, PIP: 32     @ 07:  -   @ 07:00  --------------------------------------------------------  IN: 1636.3 mL / OUT: 2583 mL / NET: -946.7 mL     @ 07:  -   @ 11:38  --------------------------------------------------------  IN: 198 mL / OUT: 105 mL / NET: 93 mL      CAPILLARY BLOOD GLUCOSE      POCT Blood Glucose.: 123 mg/dL (22 Dec 2020 11:09)    Physical Exam:   HEENT: + PERRLA, EOMI, no drainage or redness  Neck: supple,  No JVD  Respiratory: Trached and Ventilator assisted breath rhonchi bilaterally to auscultation, no accessory muscle use noted  Cardiovascular: Regular rate, regular rhythm, normal S1, S2; no murmurs or rub  Gastrointestinal: obese, soft, non-tender, non distended, no hepatosplenomegaly, normal bowel sounds  Extremities: + 2 peripheral edema, no cyanosis, no clubbing   Vascular: Equal and normal pulses: 2+ peripheral pulses throughout  Neurological: sedated  Skin: DTI to chin area, warm, dry, well perfused    HOSPITAL MEDICATIONS:  Standing Meds:  chlorhexidine 0.12% Liquid 15 milliLiter(s) Oral Mucosa every 12 hours  chlorhexidine 4% Liquid 1 Application(s) Topical <User Schedule>  dexMEDEtomidine Infusion 0.5 MICROgram(s)/kG/Hr IV Continuous <Continuous>  diazepam    Tablet 30 milliGRAM(s) Oral three times a day  heparin  Infusion. 1900 Unit(s)/Hr IV Continuous <Continuous>  imipenem/cilastatin  IVPB 250 milliGRAM(s) IV Intermittent every 12 hours  insulin lispro (ADMELOG) corrective regimen sliding scale   SubCutaneous every 6 hours  insulin NPH human recombinant 10 Unit(s) SubCutaneous every 6 hours  methadone   Solution 30 milliGRAM(s) Oral three times a day  midodrine 30 milliGRAM(s) Oral every 8 hours  pantoprazole  Injectable 40 milliGRAM(s) IV Push daily  polyethylene glycol 3350 17 Gram(s) Oral daily  senna Syrup 15 milliLiter(s) Oral two times a day      PRN Meds:      LABS:                        7.0    13.57 )-----------( 261      ( 22 Dec 2020 03:14 )             23.8     Hgb Trend: 7.0<--, 7.4<--, 7.2<--, 7.4<--, 7.8<--      138  |  99  |  48<H>  ----------------------------<  118<H>  3.4<L>   |  29  |  1.34<H>    Ca    8.3<L>      22 Dec 2020 03:14  Phos  4.6       Mg     1.5         TPro  5.8<L>  /  Alb  1.9<L>  /  TBili  0.6  /  DBili  x   /  AST  13  /  ALT  <5<L>  /  AlkPhos  91      Creatinine Trend: 1.34<--, 1.53<--, 1.61<--, 1.71<--, 1.96<--, 1.97<--  PT/INR - ( 22 Dec 2020 03:14 )   PT: 14.0 sec;   INR: 1.24 ratio         PTT - ( 22 Dec 2020 03:14 )  PTT:69.4 sec  Urinalysis Basic - ( 21 Dec 2020 17:46 )    Color: Yellow / Appearance: Slightly Turbid / S.009 / pH: x  Gluc: x / Ketone: Negative  / Bili: Negative / Urobili: 6 mg/dL   Blood: x / Protein: Trace / Nitrite: Negative   Leuk Esterase: Small / RBC: 3-5 /HPF / WBC 6-10 /HPF   Sq Epi: x / Non Sq Epi: Few / Bacteria: Few      Arterial Blood Gas:   @ 03:14  7.34/55/112/28/98.3/3.9  ABG lactate: --  Arterial Blood Gas:   @ 03:49  7.35/47/123/25/99.0/0.5  ABG lactate: --        MICROBIOLOGY:     RADIOLOGY:  [ ] Reviewed and interpreted by me

## 2020-12-22 NOTE — PROGRESS NOTE ADULT - ASSESSMENT
61 yo woman with AARON, h/o Lockett's procedure, s/p reversal 2011 who was admitted to [facility] 11/17 with covid pneumonia.  She received course of remdesevir and dexamethasone; required intubation 11/23 for acute hypoxemic respiratory failure. Hospital course complicated by Proteus bacteremia, MSSA bacteremia/ pneumonia, and Stenotrophomonas respiratory infection, as well as ATN requiring CVVHD --> HD ---. off dialysis.  CT chest 12/4 new large consolidation with cavitation in right lung.  Patient transferred [facility] 12/10; spiked high fever; antibiotics broadened to vanco x 1 and meropenem 500 mg iv 24h.  Blood culture 11/27 MSSA and proteus; 11/28 MSSA.  Bacteremia cleared.   Blood cultures 12/1, 12/11 no growth.  Sputum culture 12/1 and 12/12 Stenotrophomonas.    Multifocal covid pneumonia with respiratory failure, MSSA bacteremia 11/27 with cavitary pneumonia, Proteus bacteremia 11/27, Stenotrophomonas pneumonia vs colonization, ATN.  CxR 12/16- large right lung opacity.  perhaps aspiration pneumonia superimposed on ARDS due to covid.  Suspect Stenotrophomonas colonizing respiratory tract.  She recently received a 7 day course of levofloxacin 12/- 12/9.    Afebrile.  off pressors.  HD intermittently.  renal function improving.  s/p trach 12/18.    suggest:   increase imipenem 500 mg iv q 12 h  duration antibiotics 12/28

## 2020-12-22 NOTE — CHART NOTE - NSCHARTNOTEFT_GEN_A_CORE
NUTRITION FOLLOW-UP:    Extensive chart review conducted to obtain nutrition related information.  Unable to conduct a face to face interview or nutrition-focused physical exam due to limited contact restrictions related to Pt's medical condition and isolation precautions.  Pt continues on mechanical ventilation/sedation at this time.  Pt receiving enteral nutrition support at prescribed rate.  Noted pt/w pressure injuries - unstageable on chin, L cheek and sacrum.  Pt s/p trach on 12/18.  It appears that pt originally refused PEG, but pt now scheduled for PEG placement on 12/24.  Current wt is up x ~13kg x 2 days.  Noted pt w/3+generalized edema.      Weight:  12/19 - 128.3kg     12/17 - 115.4kg     Adm - 115kg     IBW - 55kg  Labs:  H/H 7.0/23.8  -147  Phos 4.6  NH3 66    Current Diet:  Nepro @30mL/h via NGT  Enteral Recommendations:  TF providing 1296kcal w/57gm protein. Estimated kcal needs = 20-25kcal/kg IBW = 1100-1375kcal/d.  Estimated protein needs = 1.2gm/kg IBW = 66gms/d.  TF currently meeting kcal needs.  Monitor NH3 level and adjust protein intake accordingly.      RD to Remain Available:  yes    Additional Recommendations:   1) Monitor weights, labs, BM's, skin integrity, FS tolerance to TF  2) PEG placement  3) Continue current TF regimen as tolerated.

## 2020-12-22 NOTE — PROGRESS NOTE ADULT - ASSESSMENT
This is a 60 year old Female w/ pmh of HTN, DVT on Xarelto, peritonitis s/p Oral's procedure and ileostomy (reversed 2011), AARON who presented to [facility] on 11/18  w/ fevers found to have COVID-19, intubated 11/23, course complicated by superimposed PNA and bacteremia with Stenotrophomonas (12/11, completed Levaquin x7 days), MSSA/P. mirablis bacteremia (on Cefazolin, potentially due to urine vs line-associated and MSSA in sputum due to PNA), also with lung abscesses on CT chest on 12/5 , ARTEMIO/ATN requiring CRRT and HD. Now unable to wean from ventilator and transferred from [facility] on 12/10 to offload COVID unit. Unable to wean from ventilator, s/p tracheostmy with ctsx 12/18 but family did not consent to PEG      #Neuro  Alert & oriented x3 @baseline.   -off all IV sedation, but tachypneic unable to tolerate PS trial   - c/w methadone and valium, will increase valium dose for comfort   -CTH negative   - last ammonia level was 66     #Resp  Acute hypoxemic respiratory failure 2/2 covid-19 PNA, intubated 11/23. Course complicated by MSSA pneumonia, stenotrophomonas, p. mirabalis, and lung abscess on CT chest from 12/5. Unable to wean from ventilator now trached by ctx on 12/18. Now with significant positional air leak from tracheotomy    - c/w AC 30/370/8/40%  did not tolerate PS trial   - Trached by CT surg 12/18. CT surg to remove trach sutures on 1/1/21  -csx consulted for air leak      #CV  Hx of DVT on xarelto. Intermittently requiring norepinephrine most likely from vasoplegic shock from sedation medications   - c/w midodrine to 30mg TID and will titrate down   - currently off pressors  - CT negative for PE  -c/w heparin gtt    #GI  - Continue TF, tolerating well  - Continue bowel regimen  -c/w PPI     #  ARTEMIO 2/2 ATN requiring CRRT, then Bumex challenge and now intermittent HD, finished trial with Bumex yesterday with okay response. Started on Bumex gtt now making good UO. Bumex gtt d/c'ed.   -last HD 12/17, Bumex gtt d/c'ed  -renal following   - will trend electrolytes and replete as necessary  - Rivera in place  -strict I’s and O’s  -will assess need for prn bumex     #ID  Covid 19 pneumonia, MSSA bacteremia/pneumonia, now cavitary consolidation on CT chest.  - s/p remdesivir and dexamethasone   - + Sputum/blood MSSA, + P mirablis in blood, +Stenotrophomonas in sputum   - Was previously on Cefazolin for MSSA bacteremia. Pt continues with fevers, ABX broaden to Fadia and Vanco on 12/11  - s/p Levofloxacin   - caspofungin discontinued, no fungal growth.   - Blood cx from 12/7 NGTD. 12/12 sputum with gram negative rods now speciated to Stenotrophomonas   -will D/C meropenem (12/11-12/16)   - ID following  -c/w imipenem (12/16- 12/28) 250mg q12h x 2 weeks for cavitation and MSSA, may need to adjust dose if not requiring intermittent HD      #Heme  Hx DVT on xarelto. Anemic 12/13 with no evidence of bleeding s/p 1u PRBC   -Anticoagulation with Heparin gtt   -continue to trend CBC/ PTT     #Endo  - ISS q6hrs  - NPH q6hrs   This is a 60 year old Female w/ pmh of HTN, DVT on Xarelto, peritonitis s/p Oral's procedure and ileostomy (reversed 2011), AARON who presented to [facility] on 11/18  w/ fevers found to have COVID-19, intubated 11/23, course complicated by superimposed PNA and bacteremia with Stenotrophomonas (12/11, completed Levaquin x7 days), MSSA/P. mirablis bacteremia (on Cefazolin, potentially due to urine vs line-associated and MSSA in sputum due to PNA), also with lung abscesses on CT chest on 12/5 , ARTEMIO/ATN requiring CRRT and HD. Now unable to wean from ventilator and transferred from [facility] on 12/10 to offload COVID unit. Unable to wean from ventilator, s/p tracheostmy with ctsx 12/18 but family did not consent to PEG      #Neuro  Alert & oriented x3 @baseline.   -off all IV sedation, but tachypneic unable to tolerate PS trial   - c/w methadone and valium, will increase valium dose for comfort   -CTH negative   - last ammonia level was 66     #Resp  Acute hypoxemic respiratory failure 2/2 covid-19 PNA, intubated 11/23. Course complicated by MSSA pneumonia, stenotrophomonas, p. mirabalis, and lung abscess on CT chest from 12/5. Unable to wean from ventilator now trached by ctx on 12/18. Now with significant positional air leak from tracheotomy    - c/w AC 30/370/8/40%  did not tolerate PS trial   - Trached by CT surg 12/18. CT surg to remove trach sutures on 1/1/21  -csx consulted for air leak      #CV  Hx of DVT on xarelto. Intermittently requiring norepinephrine most likely from vasoplegic shock from sedation medications   - c/w midodrine to 30mg TID and will titrate down   - currently off pressors  - CT negative for PE  -c/w heparin gtt    #GI  - Continue TF, tolerating well  - Continue bowel regimen  -c/w PPI     #  ARTEMIO 2/2 ATN requiring CRRT, then Bumex challenge and now intermittent HD, finished trial with Bumex yesterday with okay response. Started on Bumex gtt now making good UO. Bumex gtt d/c'ed.   -last HD 12/17, Bumex gtt d/c'ed. Now with some renal recovery and making adequate UO   -renal was following but will sign off for now   - will trend electrolytes and replete as necessary  - Rivera in place  -strict I’s and O’s  -will assess need for prn bumex     #ID  Covid 19 pneumonia, MSSA bacteremia/pneumonia, cavitary consolidation on CT chest.  - s/p remdesivir and dexamethasone   - + Sputum/blood MSSA, + P mirablis in blood, +Stenotrophomonas in sputum   - Was previously on Cefazolin for MSSA bacteremia. Pt continues with fevers, ABX broaden to Fadia and Vanco on 12/11  - s/p Levofloxacin   - caspofungin discontinued, no fungal growth.   - Blood cx from 12/7 NGTD. 12/12 sputum with gram negative rods now speciated to Stenotrophomonas   -will D/C meropenem (12/11-12/16)   - ID following  -c/w imipenem (12/16- 12/28) 250mg q12h x 2 weeks for cavitation and MSSA, may need to adjust dose if not requiring intermittent HD      #Heme  Hx DVT on xarelto. Anemic 12/13 with no evidence of bleeding s/p 1u PRBC. Drop in Hgb overnight to 6.6 s/p 1u PRBC   -Anticoagulation with Heparin gtt   -continue to trend CBC/ PTT  -will assess need for additional PRBC      #Endo  - ISS q6hrs  - NPH q6hrs   This is a 60 year old Female w/ pmh of HTN, DVT on Xarelto, peritonitis s/p Oral's procedure and ileostomy (reversed 2011), AARON who presented to [facility] on 11/18  w/ fevers found to have COVID-19, intubated 11/23, course complicated by superimposed PNA and bacteremia with Stenotrophomonas (12/11, completed Levaquin x7 days), MSSA/P. mirablis bacteremia (on Cefazolin, potentially due to urine vs line-associated and MSSA in sputum due to PNA), also with lung abscesses on CT chest on 12/5 , ARTEMIO/ATN requiring CRRT and HD. Now unable to wean from ventilator and transferred from [facility] on 12/10 to offload COVID unit. Unable to wean from ventilator, s/p tracheostmy with ctsx 12/18 but family did not consent to PEG      #Neuro  Alert & oriented x3 @baseline.   -off all IV sedation, but tachypneic unable to tolerate PS trial   - c/w methadone and valium, will increase valium dose for comfort   -CTH negative   - last ammonia level was 66     #Resp  Acute hypoxemic respiratory failure 2/2 covid-19 PNA, intubated 11/23. Course complicated by MSSA pneumonia, stenotrophomonas, p. mirabalis, and lung abscess on CT chest from 12/5. Unable to wean from ventilator now trached by ctx on 12/18. Now with significant positional air leak from tracheotomy    - c/w AC 30/370/8/40%  did not tolerate PS trial   - Trached by CT surg 12/18. CT surg to remove trach sutures on 1/1/21  -csx consulted for air leak      #CV  Hx of DVT on xarelto. Intermittently requiring norepinephrine most likely from vasoplegic shock from sedation medications   - c/w midodrine to 30mg TID and will titrate down   - currently off pressors  - CT negative for PE  -c/w heparin gtt    #GI  - Continue TF, tolerating well  - Continue bowel regimen  -c/w PPI     #  ARTEMIO 2/2 ATN requiring CRRT, then Bumex challenge and now intermittent HD, finished trial with Bumex yesterday with okay response. Started on Bumex gtt now making good UO. Bumex gtt d/c'ed.   -last HD 12/17, Bumex gtt d/c'ed. Now with some renal recovery and making adequate UO   -renal was following but will sign off for now   - will trend electrolytes and replete as necessary  - Rivera in place  -strict I’s and O’s  -will assess need for prn bumex     #ID  Covid 19 pneumonia, MSSA bacteremia/pneumonia, cavitary consolidation on CT chest.  - s/p remdesivir and dexamethasone   - + Sputum/blood MSSA, + P mirablis in blood, +Stenotrophomonas in sputum   - Was previously on Cefazolin for MSSA bacteremia. Pt continues with fevers, ABX broaden to Fadia and Vanco on 12/11  - s/p Levofloxacin   - caspofungin discontinued, no fungal growth.   - Blood cx from 12/7 NGTD. 12/12 sputum with gram negative rods now speciated to Stenotrophomonas   -will D/C meropenem (12/11-12/16)   - ID following  -c/w imipenem (12/16- 12/28) 250mg q12h x 2 weeks for cavitation and MSSA, may need to adjust dose if not requiring intermittent HD      #Heme  Hx DVT on xarelto. Anemic 12/13 with no evidence of bleeding s/p 1u PRBC. Drop in Hgb overnight with no signs of bleeding    -Anticoagulation with Heparin gtt   -continue to trend CBC/ PTT  -will assess need for blood if Hgb continues to drop     #Endo  - ISS q6hrs  - NPH q6hrs

## 2020-12-23 LAB
ALBUMIN SERPL ELPH-MCNC: 2 G/DL — LOW (ref 3.3–5)
ALP SERPL-CCNC: 81 U/L — SIGNIFICANT CHANGE UP (ref 40–120)
ALT FLD-CCNC: <5 U/L — LOW (ref 4–33)
ANION GAP SERPL CALC-SCNC: 10 MMOL/L — SIGNIFICANT CHANGE UP (ref 7–14)
ANION GAP SERPL CALC-SCNC: 8 MMOL/L — SIGNIFICANT CHANGE UP (ref 7–14)
APTT BLD: 25.9 SEC — LOW (ref 27–36.3)
AST SERPL-CCNC: 11 U/L — SIGNIFICANT CHANGE UP (ref 4–32)
BASOPHILS # BLD AUTO: 0.05 K/UL — SIGNIFICANT CHANGE UP (ref 0–0.2)
BASOPHILS NFR BLD AUTO: 0.4 % — SIGNIFICANT CHANGE UP (ref 0–2)
BILIRUB SERPL-MCNC: 0.6 MG/DL — SIGNIFICANT CHANGE UP (ref 0.2–1.2)
BLOOD GAS ARTERIAL COMPREHENSIVE RESULT: SIGNIFICANT CHANGE UP
BUN SERPL-MCNC: 46 MG/DL — HIGH (ref 7–23)
BUN SERPL-MCNC: 46 MG/DL — HIGH (ref 7–23)
CALCIUM SERPL-MCNC: 8.4 MG/DL — SIGNIFICANT CHANGE UP (ref 8.4–10.5)
CALCIUM SERPL-MCNC: 8.5 MG/DL — SIGNIFICANT CHANGE UP (ref 8.4–10.5)
CHLORIDE SERPL-SCNC: 101 MMOL/L — SIGNIFICANT CHANGE UP (ref 98–107)
CHLORIDE SERPL-SCNC: 103 MMOL/L — SIGNIFICANT CHANGE UP (ref 98–107)
CO2 SERPL-SCNC: 30 MMOL/L — SIGNIFICANT CHANGE UP (ref 22–31)
CO2 SERPL-SCNC: 31 MMOL/L — SIGNIFICANT CHANGE UP (ref 22–31)
CREAT SERPL-MCNC: 1.18 MG/DL — SIGNIFICANT CHANGE UP (ref 0.5–1.3)
CREAT SERPL-MCNC: 1.2 MG/DL — SIGNIFICANT CHANGE UP (ref 0.5–1.3)
EOSINOPHIL # BLD AUTO: 0.17 K/UL — SIGNIFICANT CHANGE UP (ref 0–0.5)
EOSINOPHIL NFR BLD AUTO: 1.3 % — SIGNIFICANT CHANGE UP (ref 0–6)
GLUCOSE BLDC GLUCOMTR-MCNC: 104 MG/DL — HIGH (ref 70–99)
GLUCOSE BLDC GLUCOMTR-MCNC: 108 MG/DL — HIGH (ref 70–99)
GLUCOSE BLDC GLUCOMTR-MCNC: 90 MG/DL — SIGNIFICANT CHANGE UP (ref 70–99)
GLUCOSE BLDC GLUCOMTR-MCNC: 98 MG/DL — SIGNIFICANT CHANGE UP (ref 70–99)
GLUCOSE SERPL-MCNC: 89 MG/DL — SIGNIFICANT CHANGE UP (ref 70–99)
GLUCOSE SERPL-MCNC: 89 MG/DL — SIGNIFICANT CHANGE UP (ref 70–99)
HCT VFR BLD CALC: 25.2 % — LOW (ref 34.5–45)
HCT VFR BLD CALC: 25.5 % — LOW (ref 34.5–45)
HGB BLD-MCNC: 7.3 G/DL — LOW (ref 11.5–15.5)
HGB BLD-MCNC: 7.4 G/DL — LOW (ref 11.5–15.5)
IANC: 8.12 K/UL — SIGNIFICANT CHANGE UP (ref 1.5–8.5)
IMM GRANULOCYTES NFR BLD AUTO: 10.5 % — HIGH (ref 0–1.5)
INR BLD: 1.19 RATIO — HIGH (ref 0.88–1.17)
LYMPHOCYTES # BLD AUTO: 1.76 K/UL — SIGNIFICANT CHANGE UP (ref 1–3.3)
LYMPHOCYTES # BLD AUTO: 13.6 % — SIGNIFICANT CHANGE UP (ref 13–44)
MAGNESIUM SERPL-MCNC: 1.6 MG/DL — SIGNIFICANT CHANGE UP (ref 1.6–2.6)
MCHC RBC-ENTMCNC: 28.6 PG — SIGNIFICANT CHANGE UP (ref 27–34)
MCHC RBC-ENTMCNC: 28.6 PG — SIGNIFICANT CHANGE UP (ref 27–34)
MCHC RBC-ENTMCNC: 29 GM/DL — LOW (ref 32–36)
MCHC RBC-ENTMCNC: 29 GM/DL — LOW (ref 32–36)
MCV RBC AUTO: 98.5 FL — SIGNIFICANT CHANGE UP (ref 80–100)
MCV RBC AUTO: 98.8 FL — SIGNIFICANT CHANGE UP (ref 80–100)
MONOCYTES # BLD AUTO: 1.5 K/UL — HIGH (ref 0–0.9)
MONOCYTES NFR BLD AUTO: 11.6 % — SIGNIFICANT CHANGE UP (ref 2–14)
NEUTROPHILS # BLD AUTO: 8.12 K/UL — HIGH (ref 1.8–7.4)
NEUTROPHILS NFR BLD AUTO: 62.6 % — SIGNIFICANT CHANGE UP (ref 43–77)
NRBC # BLD: 2 /100 WBCS — SIGNIFICANT CHANGE UP
NRBC # BLD: 2 /100 WBCS — SIGNIFICANT CHANGE UP
NRBC # FLD: 0.24 K/UL — HIGH
NRBC # FLD: 0.26 K/UL — HIGH
PHOSPHATE SERPL-MCNC: 4.1 MG/DL — SIGNIFICANT CHANGE UP (ref 2.5–4.5)
PLATELET # BLD AUTO: 254 K/UL — SIGNIFICANT CHANGE UP (ref 150–400)
PLATELET # BLD AUTO: 259 K/UL — SIGNIFICANT CHANGE UP (ref 150–400)
POTASSIUM SERPL-MCNC: 3.8 MMOL/L — SIGNIFICANT CHANGE UP (ref 3.5–5.3)
POTASSIUM SERPL-MCNC: 3.9 MMOL/L — SIGNIFICANT CHANGE UP (ref 3.5–5.3)
POTASSIUM SERPL-SCNC: 3.8 MMOL/L — SIGNIFICANT CHANGE UP (ref 3.5–5.3)
POTASSIUM SERPL-SCNC: 3.9 MMOL/L — SIGNIFICANT CHANGE UP (ref 3.5–5.3)
PROT SERPL-MCNC: 6 G/DL — SIGNIFICANT CHANGE UP (ref 6–8.3)
PROTHROM AB SERPL-ACNC: 13.6 SEC — HIGH (ref 9.8–13.1)
RBC # BLD: 2.55 M/UL — LOW (ref 3.8–5.2)
RBC # BLD: 2.59 M/UL — LOW (ref 3.8–5.2)
RBC # FLD: 21.1 % — HIGH (ref 10.3–14.5)
RBC # FLD: 21.4 % — HIGH (ref 10.3–14.5)
SODIUM SERPL-SCNC: 141 MMOL/L — SIGNIFICANT CHANGE UP (ref 135–145)
SODIUM SERPL-SCNC: 142 MMOL/L — SIGNIFICANT CHANGE UP (ref 135–145)
WBC # BLD: 12.96 K/UL — HIGH (ref 3.8–10.5)
WBC # BLD: 13.07 K/UL — HIGH (ref 3.8–10.5)
WBC # FLD AUTO: 12.96 K/UL — HIGH (ref 3.8–10.5)
WBC # FLD AUTO: 13.07 K/UL — HIGH (ref 3.8–10.5)

## 2020-12-23 PROCEDURE — 99232 SBSQ HOSP IP/OBS MODERATE 35: CPT

## 2020-12-23 PROCEDURE — 99291 CRITICAL CARE FIRST HOUR: CPT

## 2020-12-23 RX ORDER — ACETAMINOPHEN 500 MG
1000 TABLET ORAL ONCE
Refills: 0 | Status: COMPLETED | OUTPATIENT
Start: 2020-12-23 | End: 2020-12-23

## 2020-12-23 RX ORDER — METHADONE HYDROCHLORIDE 40 MG/1
20 TABLET ORAL THREE TIMES A DAY
Refills: 0 | Status: DISCONTINUED | OUTPATIENT
Start: 2020-12-23 | End: 2020-12-24

## 2020-12-23 RX ORDER — DIAZEPAM 5 MG
20 TABLET ORAL THREE TIMES A DAY
Refills: 0 | Status: DISCONTINUED | OUTPATIENT
Start: 2020-12-23 | End: 2020-12-24

## 2020-12-23 RX ORDER — HUMAN INSULIN 100 [IU]/ML
5 INJECTION, SUSPENSION SUBCUTANEOUS EVERY 6 HOURS
Refills: 0 | Status: DISCONTINUED | OUTPATIENT
Start: 2020-12-23 | End: 2020-12-28

## 2020-12-23 RX ORDER — MIDODRINE HYDROCHLORIDE 2.5 MG/1
20 TABLET ORAL EVERY 8 HOURS
Refills: 0 | Status: DISCONTINUED | OUTPATIENT
Start: 2020-12-23 | End: 2020-12-24

## 2020-12-23 RX ORDER — MAGNESIUM SULFATE 500 MG/ML
2 VIAL (ML) INJECTION ONCE
Refills: 0 | Status: COMPLETED | OUTPATIENT
Start: 2020-12-23 | End: 2020-12-23

## 2020-12-23 RX ADMIN — Medication 30 MILLIGRAM(S): at 05:26

## 2020-12-23 RX ADMIN — HUMAN INSULIN 10 UNIT(S): 100 INJECTION, SUSPENSION SUBCUTANEOUS at 05:24

## 2020-12-23 RX ADMIN — IMIPENEM AND CILASTATIN 100 MILLIGRAM(S): 250; 250 INJECTION, POWDER, FOR SOLUTION INTRAVENOUS at 16:11

## 2020-12-23 RX ADMIN — MIDODRINE HYDROCHLORIDE 30 MILLIGRAM(S): 2.5 TABLET ORAL at 05:26

## 2020-12-23 RX ADMIN — Medication 20 MILLIGRAM(S): at 21:16

## 2020-12-23 RX ADMIN — CHLORHEXIDINE GLUCONATE 1 APPLICATION(S): 213 SOLUTION TOPICAL at 06:17

## 2020-12-23 RX ADMIN — Medication 50 GRAM(S): at 06:15

## 2020-12-23 RX ADMIN — Medication 400 MILLIGRAM(S): at 08:50

## 2020-12-23 RX ADMIN — METHADONE HYDROCHLORIDE 20 MILLIGRAM(S): 40 TABLET ORAL at 21:17

## 2020-12-23 RX ADMIN — METHADONE HYDROCHLORIDE 30 MILLIGRAM(S): 40 TABLET ORAL at 05:25

## 2020-12-23 RX ADMIN — CHLORHEXIDINE GLUCONATE 15 MILLILITER(S): 213 SOLUTION TOPICAL at 17:29

## 2020-12-23 RX ADMIN — Medication 20 MILLIGRAM(S): at 14:21

## 2020-12-23 RX ADMIN — SENNA PLUS 15 MILLILITER(S): 8.6 TABLET ORAL at 05:25

## 2020-12-23 RX ADMIN — IMIPENEM AND CILASTATIN 100 MILLIGRAM(S): 250; 250 INJECTION, POWDER, FOR SOLUTION INTRAVENOUS at 03:00

## 2020-12-23 RX ADMIN — Medication 400 MILLIGRAM(S): at 21:16

## 2020-12-23 RX ADMIN — METHADONE HYDROCHLORIDE 20 MILLIGRAM(S): 40 TABLET ORAL at 14:18

## 2020-12-23 RX ADMIN — PANTOPRAZOLE SODIUM 40 MILLIGRAM(S): 20 TABLET, DELAYED RELEASE ORAL at 12:14

## 2020-12-23 RX ADMIN — Medication 1000 MILLIGRAM(S): at 09:20

## 2020-12-23 RX ADMIN — CHLORHEXIDINE GLUCONATE 15 MILLILITER(S): 213 SOLUTION TOPICAL at 05:26

## 2020-12-23 NOTE — PROGRESS NOTE ADULT - SUBJECTIVE AND OBJECTIVE BOX
Follow Up:      Inverval History/ROS:Patient is a 60y old  Female who presents with a chief complaint of Transfer from [facility] (22 Dec 2020 08:13)    febrile   planning PEG  HD shiley removed      Allergies    Kiwi (Unknown)  latex (Anaphylaxis)  latex (Unknown)  penicillin (Other)  penicillin (Unknown)  perfume  hives (Other)  potassium acetate (Other)  soap additives hives (Other)    Intolerances        ANTIMICROBIALS: imipenem/cilastatin  IVPB 500 every 12 hours        OTHER MEDS:  chlorhexidine 0.12% Liquid 15 milliLiter(s) Oral Mucosa every 12 hours  chlorhexidine 4% Liquid 1 Application(s) Topical <User Schedule>  dexMEDEtomidine Infusion 0.5 MICROgram(s)/kG/Hr IV Continuous <Continuous>  diazepam    Tablet 30 milliGRAM(s) Oral three times a day  heparin  Infusion. 1900 Unit(s)/Hr IV Continuous <Continuous>  insulin lispro (ADMELOG) corrective regimen sliding scale   SubCutaneous every 6 hours  insulin NPH human recombinant 10 Unit(s) SubCutaneous every 6 hours  methadone   Solution 30 milliGRAM(s) Oral three times a day  midodrine 30 milliGRAM(s) Oral every 8 hours  pantoprazole  Injectable 40 milliGRAM(s) IV Push daily  polyethylene glycol 3350 17 Gram(s) Oral daily  senna Syrup 15 milliLiter(s) Oral two times a day      Vital Signs Last 24 Hrs  T(F): 101.9 (20 @ 16:00), Max: 101.9 (20 @ 16:00)  HR: 96 (20 @ 16:00)  BP: 157/64 (20 @ 16:00)  RR: 32 (20 @ 16:00)  SpO2: 92% (20 @ 16:00) (91% - 98%)    PHYSICAL EXAM:  General: [x ] trach/ on vent  HEAD/EYES: [ ] PERRL [x ] white sclera [ ] icterus  ENT:  eschar chin, left cheek, trach, NGT  Cardiovascular:   [ ] murmur [x ] normal [ ] PPM/AICD  Respiratory:  [ x] clear to ausculation bilaterally  GI:  [x ] soft, scars, normal bowel sounds  :  [x ] negro [] no CVA tenderness   Musculoskeletal:  [ ] no synovitis  Neurologic:  [ ] non-focal exam   Skin:  x[ ] no rash  Lymph: [ ] no lymphadenopathy  Psychiatric:  unable  Lines:  [ x] no phlebitis [ ] central line                          7.3    13.07 )-----------( 254      ( 23 Dec 2020 03:40 )             25.2     142  |  103  |  46  ----------------------------<  89  3.9   |  31  |  1.18  Ca    8.5      23 Dec 2020 03:40Phos  4.1     Mg     1.6       TPro  6.0  /  Alb  2.0  /  TBili  0.6  /  DBili  x   /  AST  11  /  ALT  <5  /  AlkPhos  81        Urinalysis Basic - ( 21 Dec 2020 17:46 )    Color: Yellow / Appearance: Slightly Turbid / S.009 / pH: x  Gluc: x / Ketone: Negative  / Bili: Negative / Urobili: 6 mg/dL   Blood: x / Protein: Trace / Nitrite: Negative   Leuk Esterase: Small / RBC: 3-5 /HPF / WBC 6-10 /HPF   Sq Epi: x / Non Sq Epi: Few / Bacteria: Few        MICROBIOLOGY:  culCulture - Blood (20 @ 23:09)   Specimen Source: .Blood Blood-Venous   Culture Results:   No growth to date. Culture - Blood (20 @ 23:09)   Specimen Source: .Blood Blood-Peripheral   Culture Results:   No growth to date.  RADIOLOGY:    rd< from: Xray Chest 1 View-PORTABLE IMMEDIATE (Xray Chest 1 View-PORTABLE IMMEDIATE .) (20 @ 20:25) >    EXAM:  XR CHEST PORTABLE IMMED 1V        PROCEDURE DATE:  Dec 19 2020         INTERPRETATION:  INDICATION: COVID. Nasogastric tube.    TECHNIQUE: 2 portable views of the chest.    COMPARISON: 2020    FINDINGS: There is a tracheostomy tube in place. There is a nasogastric tube with its tip below the level of this film. There is a right IJ catheter with tip overlying the superior vena cava. There are bilateral diffuse airspace opacities.    IMPRESSION: Nasogastric tube tip is below the level of these films. Bilateral diffuse airspace opacities.    < end of copied text >

## 2020-12-23 NOTE — PROGRESS NOTE ADULT - SUBJECTIVE AND OBJECTIVE BOX
Significant recent/past 24 hr events: No overnight events    Subjective:    Review of Systems       Unable to obtain due to: altered mental status      PAST MEDICAL & SURGICAL HISTORY:  Pneumomediastinum    Umbilical hernia  Reduciable    Osteoarthritis of both knees, unspecified osteoarthritis type    AARON (Obstructive Sleep Apnea)  category II pt uses c/pap pt to bering to hospital/  OR booking notified    Pneumonia      GERD (Gastroesophageal Reflux Disease)    Asthma  diagnosed   on adbvair denies attacks    DVT (Deep Venous Thrombosis)  left leg 6 m s/p knee replacement in     HTN (Hypertension)    H/O ileostomy  Ileostomy Revesal     S/P LEEP  2000    S/P Exploratory Laparotomy    peritonitis  s/p right knee replacement/ colon resection and ileostomy    S/P Colonoscopy      S/P Endoscopy   gerd    S/P  Section      History of Tubal Ligation  s/p c/section     S/P Knee Surgery  2010      FAMILY HISTORY:  Family history of gastric cancer    Family history of breast cancer (Grandparent)        Vitals   ICU Vital Signs Last 24 Hrs  T(C): 37.9 (23 Dec 2020 12:00), Max: 38.6 (23 Dec 2020 07:00)  T(F): 100.3 (23 Dec 2020 12:00), Max: 101.5 (23 Dec 2020 07:00)  HR: 80 (23 Dec 2020 12:00) (8 - 998)  BP: 166/67 (23 Dec 2020 12:00) (130/50 - 166/67)  ABP: 139/56 (23 Dec 2020 07:00) (126/67 - 168/68)  ABP(mean): 85 (23 Dec 2020 07:00) (80 - 108)  RR: 32 (23 Dec 2020 12:00) (32 - 39)  SpO2: 94% (23 Dec 2020 12:00) (91% - 99%)      Physical Exam:   HEENT: + PERRLA, EOMI, no drainage or redness  Neck: supple,  No JVD  Respiratory: Trached and Ventilator assisted breath rhonchi bilaterally to auscultation, no accessory muscle use noted  Cardiovascular: Regular rate, regular rhythm, normal S1, S2; no murmurs or rub  Gastrointestinal: obese, soft, non-tender, non distended, no hepatosplenomegaly, normal bowel sounds  Extremities: + 2 peripheral edema, no cyanosis, no clubbing   Vascular: Equal and normal pulses: 2+ peripheral pulses throughout  Neurological: sedated  Skin: DTI to chin area, warm, dry, well perfused    VENT SETTINGS   Mode: AC/ CMV (Assist Control/ Continuous Mandatory Ventilation)  RR (machine): 32  TV (machine): 370  FiO2: 40  PEEP: 8  ITime: 0.8  MAP: 14  PIP: 37    ABG - ( 23 Dec 2020 03:40 )  pH, Arterial: 7.33  pH, Blood: x     /  pCO2: 63    /  pO2: 101   / HCO3: 30    / Base Excess: 6.3   /  SaO2: 97.8        I&O's Detail    22 Dec 2020 07:01  -  23 Dec 2020 07:00  --------------------------------------------------------  IN:    Dexmedetomidine: 8 mL    Enteral Tube Flush: 160 mL    Heparin Infusion: 105 mL    IV PiggyBack: 150 mL    Nepro: 720 mL    PRBCs (Packed Red Blood Cells): 300 mL  Total IN: 1443 mL    OUT:    Indwelling Catheter - Urethral (mL): 1600 mL    Other (mL): 0 mL  Total OUT: 1600 mL    Total NET: -157 mL      23 Dec 2020 07:01  -  23 Dec 2020 13:35  --------------------------------------------------------  IN:    IV PiggyBack: 100 mL    Nepro: 120 mL  Total IN: 220 mL    OUT:    Indwelling Catheter - Urethral (mL): 155 mL  Total OUT: 155 mL    Total NET: 65 mL          LABS                        7.3    13.07 )-----------( 254      ( 23 Dec 2020 03:40 )             25.2     12-    142  |  103  |  46<H>  ----------------------------<  89  3.9   |  31  |  1.18    Ca    8.5      23 Dec 2020 03:40  Phos  4.1     12  Mg     1.6         TPro  6.0  /  Alb  2.0<L>  /  TBili  0.6  /  DBili  x   /  AST  11  /  ALT  <5<L>  /  AlkPhos  81  12-23    LIVER FUNCTIONS - ( 23 Dec 2020 03:40 )  Alb: 2.0 g/dL / Pro: 6.0 g/dL / ALK PHOS: 81 U/L / ALT: <5 U/L / AST: 11 U/L / GGT: x           PT/INR - ( 23 Dec 2020 03:40 )   PT: 13.6 sec;   INR: 1.19 ratio         PTT - ( 23 Dec 2020 03:40 )  PTT:25.9 sec        Urinalysis Basic - ( 21 Dec 2020 17:46 )    Color: Yellow / Appearance: Slightly Turbid / S.009 / pH: x  Gluc: x / Ketone: Negative  / Bili: Negative / Urobili: 6 mg/dL   Blood: x / Protein: Trace / Nitrite: Negative   Leuk Esterase: Small / RBC: 3-5 /HPF / WBC 6-10 /HPF   Sq Epi: x / Non Sq Epi: Few / Bacteria: Few      POCT Blood Glucose.: 104 mg/dL <H> (20 @ 12:10)  POCT Blood Glucose.: 98 mg/dL (20 @ 05:22)  POCT Blood Glucose.: 107 mg/dL <H> (20 @ 23:14)  POCT Blood Glucose.: 90 mg/dL (20 @ 17:06)        MEDICATIONS  (STANDING):  chlorhexidine 0.12% Liquid 15 milliLiter(s) Oral Mucosa every 12 hours  chlorhexidine 4% Liquid 1 Application(s) Topical <User Schedule>  diazepam    Tablet 20 milliGRAM(s) Oral three times a day  imipenem/cilastatin  IVPB 500 milliGRAM(s) IV Intermittent every 12 hours  insulin lispro (ADMELOG) corrective regimen sliding scale   SubCutaneous every 6 hours  insulin NPH human recombinant 5 Unit(s) SubCutaneous every 6 hours  methadone   Solution 20 milliGRAM(s) Oral three times a day  midodrine 20 milliGRAM(s) Oral every 8 hours  pantoprazole  Injectable 40 milliGRAM(s) IV Push daily  polyethylene glycol 3350 17 Gram(s) Oral daily  senna Syrup 15 milliLiter(s) Oral two times a day    MEDICATIONS  (PRN):      Allergies:  Kiwi (Unknown)  latex (Anaphylaxis)  latex (Unknown)  penicillin (Other)  penicillin (Unknown)  perfume  hives (Other)  potassium acetate (Other)  soap additives hives (Other)

## 2020-12-23 NOTE — PROGRESS NOTE ADULT - ASSESSMENT
This is a 60 year old Female w/ pmh of HTN, DVT on Xarelto, peritonitis s/p Oral's procedure and ileostomy (reversed 2011), AARON who presented to [facility] on 11/18  w/ fevers found to have COVID-19, intubated 11/23, course complicated by superimposed PNA and bacteremia with Stenotrophomonas (12/11, completed Levaquin x7 days), MSSA/P. mirablis bacteremia (on Cefazolin, potentially due to urine vs line-associated and MSSA in sputum due to PNA), also with lung abscesses on CT chest on 12/5 , ARTEMIO/ATN requiring CRRT and HD. Now unable to wean from ventilator and transferred from [facility] on 12/10 to offload COVID unit. Unable to wean from ventilator, s/p tracheostmy with ctsx 12/18 but family did not consent to PEG      #Neuro  Alert & oriented x3 @baseline.   -off all IV sedation, c/w altered mental status  - decreased methadone and valium  - CTH negative   - last ammonia level was 66     #Resp  Acute hypoxemic respiratory failure 2/2 covid-19 PNA, intubated 11/23. Course complicated by MSSA pneumonia, stenotrophomonas, p. mirabalis, and lung abscess on CT chest from 12/5. Unable to wean from ventilator now trached by ctx on 12/18. Now with significant positional air leak from tracheotomy    - c/w AC 30/370/8/40%  did not tolerate PS trial   - Trached by CT surg 12/18. CT surg to remove trach sutures on 1/1/21  - csx consulted for air leak      #CV  Hx of DVT on xarelto. Intermittently requiring norepinephrine most likely from vasoplegic shock from sedation medications   - c/w midodrine to 20mg TID and will titrate down   - Currently off pressors  - CT negative for PE  - Heparin gtt on hold for PEG placement    #GI  - Continue TF, tolerating well  - Continue bowel regimen  - c/w PPI     #  ARTEMIO 2/2 ATN requiring CRRT, then Bumex challenge and now intermittent HD, finished trial with Bumex yesterday with okay response. Started on Bumex gtt now making good UO. Bumex gtt d/c'ed.   -last HD 12/17, Bumex gtt d/c'ed. Now with some renal recovery and making adequate UO   -renal was following but will sign off for now   - will trend electrolytes and replete as necessary  - Rivera in place  -strict I’s and O’s  -will assess need for prn Bumex     #ID  Covid 19 pneumonia, MSSA bacteremia/pneumonia, cavitary consolidation on CT chest.  - s/p remdesivir and dexamethasone   - + Sputum/blood MSSA, + P mirablis in blood, +Stenotrophomonas in sputum   - Was previously on Cefazolin for MSSA bacteremia. Pt continues with fevers, ABX broaden to Fadia and Vanco on 12/11  - s/p Levofloxacin   - caspofungin discontinued, no fungal growth.   - Blood cx from 12/7 NGTD. 12/12 sputum with gram negative rods now speciated to Stenotrophomonas   -will D/C meropenem (12/11-12/16)   - ID following  -c/w imipenem (12/16- 12/28) 500mg q12h x 2 weeks for cavitation and MSSA,     #Heme  Hx DVT on xarelto. Anemic 12/13 with no evidence of bleeding s/p 1u PRBC. Drop in Hgb overnight with no signs of bleeding    -Anticoagulation with Heparin gtt   -continue to trend CBC/ PTT  -will assess need for blood if Hgb continues to drop     #Endo  - ISS q6hrs  - NPH q6hrs

## 2020-12-23 NOTE — CHART NOTE - NSCHARTNOTEFT_GEN_A_CORE
Plan for EGD/PEG placement tomorrow with Dr. Cruz.  Consent to be obtained.  NPO with tube feeds after midnight

## 2020-12-23 NOTE — PROGRESS NOTE ADULT - ASSESSMENT
59 yo woman with AARON, h/o Lockett's procedure, s/p reversal 2011 who was admitted to [facility] 11/17 with covid pneumonia.  She received course of remdesevir and dexamethasone; required intubation 11/23 for acute hypoxemic respiratory failure. Hospital course complicated by Proteus bacteremia, MSSA bacteremia/ pneumonia, and Stenotrophomonas respiratory infection, as well as ATN requiring CVVHD --> HD ---. off dialysis.  CT chest 12/4 new large consolidation with cavitation in right lung.  Patient transferred [facility] 12/10; spiked high fever; antibiotics broadened to vanco x 1 and meropenem 500 mg iv 24h.  Blood culture 11/27 MSSA and proteus; 11/28 MSSA.  Bacteremia cleared.   Blood cultures 12/1, 12/11 no growth.  Sputum culture 12/1 and 12/12 Stenotrophomonas.    Multifocal covid pneumonia with respiratory failure, MSSA bacteremia 11/27 with cavitary pneumonia, Proteus bacteremia 11/27, Stenotrophomonas pneumonia vs colonization, ATN.  CxR 12/16- large right lung opacity.  perhaps aspiration pneumonia superimposed on ARDS due to covid.  Suspect Stenotrophomonas colonizing respiratory tract.  She recently received a 7 day course of levofloxacin 12/- 12/9.    Renal function improving. now HD discontinued and Shiley removed.  s/p trach 12/18.  started spiking temp 12/21.  blood cultures drawn 12/21 no growth to date.   ?aspiration with trach and NGT  planning PEG    suggest:   follow 12/21 blood cultures   c/w imipenem 500 mg iv q 12 h  duration antibiotics 12/28   remove/ change lines        Mila Burgos MD  Pager: [phone]  After 5 PM or weekends please call fellow on call or office [phone]

## 2020-12-23 NOTE — PROGRESS NOTE ADULT - ATTENDING COMMENTS
60 F who presented to [facility] 11/18 (transferred to [facility] 12/10) with dyspnea. Tested positive for COVID-19+ 11/17 and intubated 11/23 for acute hypoxemic respiratory failure /  ARDS. Course c/b lung abscess, MSSA bacteremia, and stenotrophomonas in sputum. Currently on imipenem/cilastatin. Also developed an ARTEMIO requiring HD. Urine output is improved and creatinine continues to normalize. HD catheter was pulled 12/21. She is s/p tracheostomy 12/18 and tentatively scheduled for PEG tomorrow. Hgb was < 7 yesterday and one 1 unit pRBC was transfused. Hgb is stable. Heparin gtt (indicated for prior history of VTE) was held yesterday for the slight drop in Hgb. Will consider restarting following PEG placement. Patient appears comfortable although not exhibiting purposeful movements. Will wean PO methadone and diazepam. Remainder of plan as above.

## 2020-12-24 ENCOUNTER — APPOINTMENT (OUTPATIENT)
Dept: THORACIC SURGERY | Facility: HOSPITAL | Age: 60
End: 2020-12-24

## 2020-12-24 LAB
ALBUMIN SERPL ELPH-MCNC: 2.1 G/DL — LOW (ref 3.3–5)
ALP SERPL-CCNC: 81 U/L — SIGNIFICANT CHANGE UP (ref 40–120)
ALT FLD-CCNC: <5 U/L — LOW (ref 4–33)
ANION GAP SERPL CALC-SCNC: 10 MMOL/L — SIGNIFICANT CHANGE UP (ref 7–14)
APTT BLD: 27.6 SEC — SIGNIFICANT CHANGE UP (ref 27–36.3)
AST SERPL-CCNC: 17 U/L — SIGNIFICANT CHANGE UP (ref 4–32)
BASOPHILS # BLD AUTO: 0.06 K/UL — SIGNIFICANT CHANGE UP (ref 0–0.2)
BASOPHILS NFR BLD AUTO: 0.4 % — SIGNIFICANT CHANGE UP (ref 0–2)
BILIRUB SERPL-MCNC: 0.6 MG/DL — SIGNIFICANT CHANGE UP (ref 0.2–1.2)
BUN SERPL-MCNC: 46 MG/DL — HIGH (ref 7–23)
CALCIUM SERPL-MCNC: 8.6 MG/DL — SIGNIFICANT CHANGE UP (ref 8.4–10.5)
CHLORIDE SERPL-SCNC: 103 MMOL/L — SIGNIFICANT CHANGE UP (ref 98–107)
CO2 SERPL-SCNC: 31 MMOL/L — SIGNIFICANT CHANGE UP (ref 22–31)
CREAT SERPL-MCNC: 1.01 MG/DL — SIGNIFICANT CHANGE UP (ref 0.5–1.3)
EOSINOPHIL # BLD AUTO: 0.22 K/UL — SIGNIFICANT CHANGE UP (ref 0–0.5)
EOSINOPHIL NFR BLD AUTO: 1.6 % — SIGNIFICANT CHANGE UP (ref 0–6)
GLUCOSE BLDC GLUCOMTR-MCNC: 105 MG/DL — HIGH (ref 70–99)
GLUCOSE BLDC GLUCOMTR-MCNC: 123 MG/DL — HIGH (ref 70–99)
GLUCOSE BLDC GLUCOMTR-MCNC: 86 MG/DL — SIGNIFICANT CHANGE UP (ref 70–99)
GLUCOSE BLDC GLUCOMTR-MCNC: 97 MG/DL — SIGNIFICANT CHANGE UP (ref 70–99)
GLUCOSE SERPL-MCNC: 110 MG/DL — HIGH (ref 70–99)
HCT VFR BLD CALC: 26.5 % — LOW (ref 34.5–45)
HGB BLD-MCNC: 7.6 G/DL — LOW (ref 11.5–15.5)
IANC: 9.06 K/UL — HIGH (ref 1.5–8.5)
IMM GRANULOCYTES NFR BLD AUTO: 7.5 % — HIGH (ref 0–1.5)
INR BLD: 1.22 RATIO — HIGH (ref 0.88–1.16)
LYMPHOCYTES # BLD AUTO: 1.76 K/UL — SIGNIFICANT CHANGE UP (ref 1–3.3)
LYMPHOCYTES # BLD AUTO: 13.1 % — SIGNIFICANT CHANGE UP (ref 13–44)
MAGNESIUM SERPL-MCNC: 1.6 MG/DL — SIGNIFICANT CHANGE UP (ref 1.6–2.6)
MCHC RBC-ENTMCNC: 28.7 GM/DL — LOW (ref 32–36)
MCHC RBC-ENTMCNC: 28.7 PG — SIGNIFICANT CHANGE UP (ref 27–34)
MCV RBC AUTO: 100 FL — SIGNIFICANT CHANGE UP (ref 80–100)
MONOCYTES # BLD AUTO: 1.32 K/UL — HIGH (ref 0–0.9)
MONOCYTES NFR BLD AUTO: 9.8 % — SIGNIFICANT CHANGE UP (ref 2–14)
NEUTROPHILS # BLD AUTO: 9.06 K/UL — HIGH (ref 1.8–7.4)
NEUTROPHILS NFR BLD AUTO: 67.6 % — SIGNIFICANT CHANGE UP (ref 43–77)
NRBC # BLD: 0 /100 WBCS — SIGNIFICANT CHANGE UP
NRBC # FLD: 0.06 K/UL — HIGH
PHOSPHATE SERPL-MCNC: 3.5 MG/DL — SIGNIFICANT CHANGE UP (ref 2.5–4.5)
PLATELET # BLD AUTO: 245 K/UL — SIGNIFICANT CHANGE UP (ref 150–400)
POTASSIUM SERPL-MCNC: 4 MMOL/L — SIGNIFICANT CHANGE UP (ref 3.5–5.3)
POTASSIUM SERPL-SCNC: 4 MMOL/L — SIGNIFICANT CHANGE UP (ref 3.5–5.3)
PROT SERPL-MCNC: 6.2 G/DL — SIGNIFICANT CHANGE UP (ref 6–8.3)
PROTHROM AB SERPL-ACNC: 13.8 SEC — HIGH (ref 10.6–13.6)
RBC # BLD: 2.65 M/UL — LOW (ref 3.8–5.2)
RBC # FLD: 20.9 % — HIGH (ref 10.3–14.5)
SODIUM SERPL-SCNC: 144 MMOL/L — SIGNIFICANT CHANGE UP (ref 135–145)
VANCOMYCIN FLD-MCNC: 2.9 UG/ML — SIGNIFICANT CHANGE UP
WBC # BLD: 13.42 K/UL — HIGH (ref 3.8–10.5)
WBC # FLD AUTO: 13.42 K/UL — HIGH (ref 3.8–10.5)

## 2020-12-24 PROCEDURE — 99291 CRITICAL CARE FIRST HOUR: CPT

## 2020-12-24 PROCEDURE — 99232 SBSQ HOSP IP/OBS MODERATE 35: CPT

## 2020-12-24 PROCEDURE — 99231 SBSQ HOSP IP/OBS SF/LOW 25: CPT

## 2020-12-24 RX ORDER — ENOXAPARIN SODIUM 100 MG/ML
100 INJECTION SUBCUTANEOUS EVERY 12 HOURS
Refills: 0 | Status: DISCONTINUED | OUTPATIENT
Start: 2020-12-24 | End: 2020-12-29

## 2020-12-24 RX ORDER — DIAZEPAM 5 MG
10 TABLET ORAL THREE TIMES A DAY
Refills: 0 | Status: DISCONTINUED | OUTPATIENT
Start: 2020-12-24 | End: 2020-12-27

## 2020-12-24 RX ORDER — METHADONE HYDROCHLORIDE 40 MG/1
10 TABLET ORAL THREE TIMES A DAY
Refills: 0 | Status: DISCONTINUED | OUTPATIENT
Start: 2020-12-24 | End: 2020-12-26

## 2020-12-24 RX ADMIN — METHADONE HYDROCHLORIDE 10 MILLIGRAM(S): 40 TABLET ORAL at 21:26

## 2020-12-24 RX ADMIN — PANTOPRAZOLE SODIUM 40 MILLIGRAM(S): 20 TABLET, DELAYED RELEASE ORAL at 09:16

## 2020-12-24 RX ADMIN — Medication 0: at 17:33

## 2020-12-24 RX ADMIN — Medication 20 MILLIGRAM(S): at 05:34

## 2020-12-24 RX ADMIN — IMIPENEM AND CILASTATIN 100 MILLIGRAM(S): 250; 250 INJECTION, POWDER, FOR SOLUTION INTRAVENOUS at 15:57

## 2020-12-24 RX ADMIN — IMIPENEM AND CILASTATIN 100 MILLIGRAM(S): 250; 250 INJECTION, POWDER, FOR SOLUTION INTRAVENOUS at 02:14

## 2020-12-24 RX ADMIN — SENNA PLUS 15 MILLILITER(S): 8.6 TABLET ORAL at 17:27

## 2020-12-24 RX ADMIN — CHLORHEXIDINE GLUCONATE 1 APPLICATION(S): 213 SOLUTION TOPICAL at 05:36

## 2020-12-24 RX ADMIN — Medication 10 MILLIGRAM(S): at 13:24

## 2020-12-24 RX ADMIN — Medication 1000 MILLIGRAM(S): at 00:01

## 2020-12-24 RX ADMIN — POLYETHYLENE GLYCOL 3350 17 GRAM(S): 17 POWDER, FOR SOLUTION ORAL at 09:16

## 2020-12-24 RX ADMIN — METHADONE HYDROCHLORIDE 10 MILLIGRAM(S): 40 TABLET ORAL at 13:25

## 2020-12-24 RX ADMIN — SENNA PLUS 15 MILLILITER(S): 8.6 TABLET ORAL at 05:35

## 2020-12-24 RX ADMIN — Medication 10 MILLIGRAM(S): at 21:26

## 2020-12-24 RX ADMIN — Medication 20 MILLIGRAM(S): at 09:13

## 2020-12-24 RX ADMIN — CHLORHEXIDINE GLUCONATE 15 MILLILITER(S): 213 SOLUTION TOPICAL at 05:36

## 2020-12-24 RX ADMIN — HUMAN INSULIN 5 UNIT(S): 100 INJECTION, SUSPENSION SUBCUTANEOUS at 23:23

## 2020-12-24 RX ADMIN — HUMAN INSULIN 5 UNIT(S): 100 INJECTION, SUSPENSION SUBCUTANEOUS at 00:02

## 2020-12-24 RX ADMIN — ENOXAPARIN SODIUM 100 MILLIGRAM(S): 100 INJECTION SUBCUTANEOUS at 17:27

## 2020-12-24 RX ADMIN — METHADONE HYDROCHLORIDE 20 MILLIGRAM(S): 40 TABLET ORAL at 09:15

## 2020-12-24 RX ADMIN — CHLORHEXIDINE GLUCONATE 15 MILLILITER(S): 213 SOLUTION TOPICAL at 17:27

## 2020-12-24 RX ADMIN — METHADONE HYDROCHLORIDE 20 MILLIGRAM(S): 40 TABLET ORAL at 05:34

## 2020-12-24 NOTE — PROGRESS NOTE ADULT - ASSESSMENT
61 yo woman with AARON, h/o Lockett's procedure, s/p reversal 2011 who was admitted to [facility] 11/17 with covid pneumonia.  She received course of remdesevir and dexamethasone; required intubation 11/23 for acute hypoxemic respiratory failure. Hospital course complicated by Proteus bacteremia, MSSA bacteremia/ pneumonia, and Stenotrophomonas respiratory infection, as well as ATN requiring CVVHD --> HD ---. off dialysis.  CT chest 12/4 new large consolidation with cavitation in right lung.  Patient transferred [facility] 12/10; spiked high fever; antibiotics broadened to vanco x 1 and meropenem 500 mg iv 24h.  Blood culture 11/27 MSSA and proteus; 11/28 MSSA.  Bacteremia cleared.   Blood cultures 12/1, 12/11 no growth.  Sputum culture 12/1 and 12/12 Stenotrophomonas.    Multifocal covid pneumonia with respiratory failure, MSSA bacteremia 11/27 with cavitary pneumonia, Proteus bacteremia 11/27, Stenotrophomonas pneumonia vs colonization, ATN.  CxR 12/16- large right lung opacity.  perhaps aspiration pneumonia superimposed on ARDS due to covid.  Suspect Stenotrophomonas colonizing respiratory tract.  She recently received a 7 day course of levofloxacin 12/- 12/9.    Renal function improving. now HD discontinued and Shiley removed.  s/p trach 12/18.  started spiking temp 12/21.  blood cultures drawn 12/21 no growth to date.   ?aspiration with trach and NGT  planning PEG    suggest:   follow 12/21 blood cultures   c/w imipenem 500 mg iv q 12 h  duration antibiotics 12/28       ID service available for questions over weekend.    Mila Burgos MD  Pager: [phone]  After 5 PM or weekends please call fellow on call or office [phone]

## 2020-12-24 NOTE — PROGRESS NOTE ADULT - SUBJECTIVE AND OBJECTIVE BOX
Follow Up:      Inverval History/ROS:Patient is a 60y old  Female who presents with a chief complaint of Transfer from [facility] (22 Dec 2020 08:13)     planning PEG postponed today-         Allergies    Kiwi (Unknown)  latex (Anaphylaxis)  latex (Unknown)  penicillin (Other)  penicillin (Unknown)  perfume  hives (Other)  potassium acetate (Other)  soap additives hives (Other)    Intolerances        ANTIMICROBIALS: imipenem/cilastatin  IVPB 500 every 12 hours        OTHER MEDS:  chlorhexidine 0.12% Liquid 15 milliLiter(s) Oral Mucosa every 12 hours  chlorhexidine 4% Liquid 1 Application(s) Topical <User Schedule>  dexMEDEtomidine Infusion 0.5 MICROgram(s)/kG/Hr IV Continuous <Continuous>  diazepam    Tablet 30 milliGRAM(s) Oral three times a day  heparin  Infusion. 1900 Unit(s)/Hr IV Continuous <Continuous>  insulin lispro (ADMELOG) corrective regimen sliding scale   SubCutaneous every 6 hours  insulin NPH human recombinant 10 Unit(s) SubCutaneous every 6 hours  methadone   Solution 30 milliGRAM(s) Oral three times a day  midodrine 30 milliGRAM(s) Oral every 8 hours  pantoprazole  Injectable 40 milliGRAM(s) IV Push daily  polyethylene glycol 3350 17 Gram(s) Oral daily  senna Syrup 15 milliLiter(s) Oral two times a day    Vital Signs Last 24 Hrs  T(F): 99.2 (12-24-20 @ 12:00), Max: 102.3 (12-23-20 @ 20:00)  HR: 85 (12-24-20 @ 12:00)  BP: 151/63 (12-23-20 @ 18:00)  RR: 32 (12-24-20 @ 12:00)  SpO2: 93% (12-24-20 @ 12:00) (90% - 95%)    PHYSICAL EXAM:  General: [x ] trach/ on vent/ cooling blanket  HEAD/EYES: [ ] PERRL [x ] white sclera [ ] icterus  ENT:  eschar chin, left cheek, trach, NGT  Cardiovascular:   distant  Respiratory:  [ x] clear to ausculation bilaterally anteriorly  GI:  [x ] soft, scars, hernia  :  [x ] negro [] no CVA tenderness   Musculoskeletal:  [ ] no synovitis  Neurologic:  unable   Skin:  x[ ] no rash  Psychiatric:  unable  Lines:  [ x] no phlebitis [ ] central line                          7.6    13.42 )-----------( 245      ( 24 Dec 2020 05:24 )             26.5 12-24    144  |  103  |  46  ----------------------------<  110  4.0   |  31  |  1.01  Ca    8.6      24 Dec 2020 05:24Phos  3.5     12-24Mg     1.6     12-24  TPro  6.2  /  Alb  2.1  /  TBili  0.6  /  DBili  x   /  AST  17  /  ALT  <5  /  AlkPhos  81  12-24          MICROBIOLOGY:  culCulture - Blood (12.21.20 @ 23:09)   Specimen Source: .Blood Blood-Venous   Culture Results:   No growth to date. Culture - Blood (12.21.20 @ 23:09)   Specimen Source: .Blood Blood-Peripheral   Culture Results:   No growth to date.  RADIOLOGY:    rd< from: Xray Chest 1 View-PORTABLE IMMEDIATE (Xray Chest 1 View-PORTABLE IMMEDIATE .) (12.19.20 @ 20:25) >    EXAM:  XR CHEST PORTABLE IMMED 1V        PROCEDURE DATE:  Dec 19 2020         INTERPRETATION:  INDICATION: COVID. Nasogastric tube.    TECHNIQUE: 2 portable views of the chest.    COMPARISON: 12/18/2020    FINDINGS: There is a tracheostomy tube in place. There is a nasogastric tube with its tip below the level of this film. There is a right IJ catheter with tip overlying the superior vena cava. There are bilateral diffuse airspace opacities.    IMPRESSION: Nasogastric tube tip is below the level of these films. Bilateral diffuse airspace opacities.    < end of copied text >

## 2020-12-24 NOTE — PROGRESS NOTE ADULT - SUBJECTIVE AND OBJECTIVE BOX
Significant recent/past 24 hr events: No overnight events    Subjective:    Review of Systems       Unable to obtain due to: Altered mental status        PAST MEDICAL & SURGICAL HISTORY:  Pneumomediastinum    Umbilical hernia  Reducible    Osteoarthritis of both knees, unspecified osteoarthritis type    AARON (Obstructive Sleep Apnea)  category II pt uses c/pap pt to bering to hospital/  OR booking notified    Pneumonia      GERD (Gastroesophageal Reflux Disease)    Asthma  diagnosed   on adbvair denies attacks    DVT (Deep Venous Thrombosis)  left leg 6 m s/p knee replacement in     HTN (Hypertension)    H/O ileostomy  Ileostomy Revesal     S/P LEEP  2000    S/P Exploratory Laparotomy    peritonitis  s/p right knee replacement/ colon resection and ileostomy    S/P Colonoscopy      S/P Endoscopy   gerd    S/P  Section      History of Tubal Ligation  s/p c/section     S/P Knee Surgery  2010      FAMILY HISTORY:  Family history of gastric cancer    Family history of breast cancer (Grandparent)        Vitals   ICU Vital Signs Last 24 Hrs  T(C): 37.1 (24 Dec 2020 04:00), Max: 39.1 (23 Dec 2020 20:00)  T(F): 98.8 (24 Dec 2020 04:00), Max: 102.3 (23 Dec 2020 20:00)  HR: 72 (24 Dec 2020 06:46) (72 - 100)  BP: 151/63 (23 Dec 2020 18:00) (151/63 - 166/67)  ABP: 154/62 (24 Dec 2020 06:00) (137/57 - 156/66)  ABP(mean): 102 (24 Dec 2020 06:00) (89 - 102)  RR: 32 (24 Dec 2020 06:00) (32 - 32)  SpO2: 94% (24 Dec 2020 06:46) (90% - 95%)      Physical Exam:   HEENT: + PERRLA, EOMI, no drainage or redness  Neck: supple,  No JVD  Respiratory: Trached and Ventilator assisted breath rhonchi bilaterally to auscultation, no accessory muscle use noted  Cardiovascular: Regular rate, regular rhythm, normal S1, S2; no murmurs or rub  Gastrointestinal: obese, soft, non-tender, non distended, no hepatosplenomegaly, normal bowel sounds  Extremities: + 2 peripheral edema, no cyanosis, no clubbing   Vascular: Equal and normal pulses: 2+ peripheral pulses throughout  Neurological: awake not responsive to commands  Skin: DTI to chin area, warm, dry, well perfused    VENT SETTINGS   Mode: AC/ CMV (Assist Control/ Continuous Mandatory Ventilation)  RR (machine): 32  TV (machine): 370  FiO2: 40  PEEP: 8  ITime: 0.66  MAP: 20  PIP: 37    ABG - ( 23 Dec 2020 03:40 )  pH, Arterial: 7.33  pH, Blood: x     /  pCO2: 63    /  pO2: 101   / HCO3: 30    / Base Excess: 6.3   /  SaO2: 97.8            I&O's Detail    23 Dec 2020 07:01  -  24 Dec 2020 07:00  --------------------------------------------------------  IN:    Enteral Tube Flush: 220 mL    IV PiggyBack: 300 mL    Nepro: 420 mL  Total IN: 940 mL    OUT:    Indwelling Catheter - Urethral (mL): 1055 mL  Total OUT: 1055 mL    Total NET: -115 mL          LABS                        7.6    13.42 )-----------( 245      ( 24 Dec 2020 05:24 )             26.5     12-    144  |  103  |  46<H>  ----------------------------<  110<H>  4.0   |  31  |  1.01    Ca    8.6      24 Dec 2020 05:24  Phos  3.5       Mg     1.6         TPro  6.2  /  Alb  2.1<L>  /  TBili  0.6  /  DBili  x   /  AST  17  /  ALT  <5<L>  /  AlkPhos  81  12-24    LIVER FUNCTIONS - ( 24 Dec 2020 05:24 )  Alb: 2.1 g/dL / Pro: 6.2 g/dL / ALK PHOS: 81 U/L / ALT: <5 U/L / AST: 17 U/L / GGT: x           PT/INR - ( 24 Dec 2020 05:24 )   PT: 13.8 sec;   INR: 1.22 ratio         PTT - ( 24 Dec 2020 05:24 )  PTT:27.6 sec          POCT Blood Glucose.: 123 mg/dL <H> (20 @ 05:19)  POCT Blood Glucose.: 108 mg/dL <H> (20 @ 23:12)  POCT Blood Glucose.: 90 mg/dL (20 @ 18:14)  POCT Blood Glucose.: 104 mg/dL <H> (20 @ 12:10)        MEDICATIONS  (STANDING):  chlorhexidine 0.12% Liquid 15 milliLiter(s) Oral Mucosa every 12 hours  chlorhexidine 4% Liquid 1 Application(s) Topical <User Schedule>  diazepam    Tablet 20 milliGRAM(s) Oral three times a day  imipenem/cilastatin  IVPB 500 milliGRAM(s) IV Intermittent every 12 hours  insulin lispro (ADMELOG) corrective regimen sliding scale   SubCutaneous every 6 hours  insulin NPH human recombinant 5 Unit(s) SubCutaneous every 6 hours  methadone   Solution 20 milliGRAM(s) Oral three times a day  midodrine 20 milliGRAM(s) Oral every 8 hours  pantoprazole  Injectable 40 milliGRAM(s) IV Push daily  polyethylene glycol 3350 17 Gram(s) Oral daily  senna Syrup 15 milliLiter(s) Oral two times a day    MEDICATIONS  (PRN):      Allergies:  Kiwi (Unknown)  latex (Anaphylaxis)  latex (Unknown)  penicillin (Other)  penicillin (Unknown)  perfume  hives (Other)  potassium acetate (Other)  soap additives hives (Other)

## 2020-12-24 NOTE — PROGRESS NOTE ADULT - ATTENDING COMMENTS
60 F who presented to [facility] 11/18 (transferred to [facility] 12/10) with dyspnea. Tested positive for COVID-19+ 11/17 and intubated 11/23 for acute hypoxemic respiratory failure /  ARDS. Course c/b lung abscess, MSSA bacteremia, and stenotrophomonas in sputum. Currently on imipenem/cilastatin. Also developed an ARTEMIO requiring HD. Urine output is improved and she no longer requires HD. Martha was pulled 12/21. She is s/p tracheostomy 12/18. Will need to reach out to general surgery regarding PEG placement. Restart A/C today (indicated for history of VTE). Continue to wean off methadone and diazepam. Remainder of plan as above.

## 2020-12-24 NOTE — PROGRESS NOTE ADULT - ASSESSMENT
This is a 60 year old Female with a PMH of HTN, DVT on Xarelto, peritonitis s/p Oral's procedure and ileostomy (reversed 2011), AARON who presented to [facility] on 11/18  w/ fevers found to have COVID-19, intubated 11/23, course complicated by superimposed PNA and bacteremia with Stenotrophomonas (12/11, completed Levaquin x7 days), MSSA/P. mirablis bacteremia (on Cefazolin, potentially due to urine vs line-associated and MSSA in sputum due to PNA), also with lung abscesses on CT chest on 12/5 , ARTEMIO/ATN requiring CRRT and HD. Now unable to wean from ventilator and transferred from [facility] on 12/10 to offload COVID unit. Unable to wean from ventilator, s/p tracheostmy with ctsx 12/18 but family did not consent to PEG      #Neuro  Alert & oriented x3 @baseline.   -off all IV sedation, c/w altered mental status  - decreased methadone and valium 12/23. Continue to wean   - CTH negative   - last ammonia level was 66     #Resp  Acute hypoxemic respiratory failure 2/2 covid-19 PNA, intubated 11/23. Course complicated by MSSA pneumonia, stenotrophomonas, p. mirabalis, and lung abscess on CT chest from 12/5. Unable to wean from ventilator now trached by ctx on 12/18. Now with significant positional air leak from tracheotomy    - c/w AC 30/370/8/40%  did not tolerate PS trial   - Trached by CT surg 12/18. CT surg to remove trach sutures on 1/1/21       #CV  Hx of DVT on Xarelto Intermittently requiring norepinephrine most likely from vasoplegic shock from sedation medications   - D/c'ed midodrine  - Currently off pressors  - CT negative for PE  - Will restart Heparin gtt    #GI  - Continue TF, tolerating well  - Continue bowel regimen  - c/w PPI   - Per Dr. Cruz from CT surge, Due to Ventral Hernia, pt is not a candidate for Percutaneous  gastrostomy. Will need to consult general surgery for possible open gastrostomy tube     #  ARTEMIO 2/2 ATN requiring CRRT, then Bumex challenge and now intermittent HD, finished trial with Bumex yesterday with okay response. Started on Bumex gtt now making good UO. Bumex gtt d/c'ed.   -last HD 12/17, Bumex gtt d/c'ed. Now with some renal recovery and making adequate UO   -renal was following but will sign off for now   - will trend electrolytes and replete as necessary  - Rivera in place  -strict I’s and O’s  -will assess need for prn Bumex     #ID  Covid 19 pneumonia, MSSA bacteremia/pneumonia, cavitary consolidation on CT chest.  - s/p remdesivir and dexamethasone   - + Sputum/blood MSSA, + P mirablis in blood, +Stenotrophomonas in sputum   - Was previously on Cefazolin for MSSA bacteremia. Pt continues with fevers, ABX broaden to Fadia and Vanco on 12/11  - s/p Levofloxacin   - caspofungin discontinued, no fungal growth.   - Blood cx from 12/7 NGTD. 12/12 sputum with gram negative rods now speciated to Stenotrophomonas   -will D/C meropenem (12/11-12/16)   - ID following  -c/w imipenem (12/16- 12/28) 500mg q12h x 2 weeks for cavitation and MSSA,     #Heme  Hx DVT on Xarelto Anemic 12/13 with no evidence of bleeding s/p 1u PRBC. Drop in Hgb overnight with no signs of bleeding    -Anticoagulation with Heparin gtt   -continue to trend CBC/ PTT  -will assess need for blood if Hgb continues to drop     #Endo  - ISS q6hrs  - NPH q6hrs      #Dispo: Pt stable for transfer to RCU

## 2020-12-24 NOTE — PROGRESS NOTE ADULT - SUBJECTIVE AND OBJECTIVE BOX
D/W Dr Cruz regarding request for PEG. Due to Ventral Hernia, pt is not a candidate for Percutaneous  gastrostomy. Please call general surgery for possible open gastrostomy tube . Will remove trach sutures 1/1/2021

## 2020-12-25 LAB
ALBUMIN SERPL ELPH-MCNC: 1.9 G/DL — LOW (ref 3.3–5)
ALP SERPL-CCNC: 74 U/L — SIGNIFICANT CHANGE UP (ref 40–120)
ALT FLD-CCNC: <5 U/L — LOW (ref 4–33)
ANION GAP SERPL CALC-SCNC: 9 MMOL/L — SIGNIFICANT CHANGE UP (ref 7–14)
APTT BLD: 29.7 SEC — SIGNIFICANT CHANGE UP (ref 27–36.3)
AST SERPL-CCNC: 15 U/L — SIGNIFICANT CHANGE UP (ref 4–32)
BASOPHILS # BLD AUTO: 0.08 K/UL — SIGNIFICANT CHANGE UP (ref 0–0.2)
BASOPHILS NFR BLD AUTO: 0.7 % — SIGNIFICANT CHANGE UP (ref 0–2)
BILIRUB SERPL-MCNC: 0.6 MG/DL — SIGNIFICANT CHANGE UP (ref 0.2–1.2)
BUN SERPL-MCNC: 40 MG/DL — HIGH (ref 7–23)
CALCIUM SERPL-MCNC: 8.3 MG/DL — LOW (ref 8.4–10.5)
CHLORIDE SERPL-SCNC: 104 MMOL/L — SIGNIFICANT CHANGE UP (ref 98–107)
CO2 SERPL-SCNC: 32 MMOL/L — HIGH (ref 22–31)
CREAT SERPL-MCNC: 0.81 MG/DL — SIGNIFICANT CHANGE UP (ref 0.5–1.3)
EOSINOPHIL # BLD AUTO: 0.27 K/UL — SIGNIFICANT CHANGE UP (ref 0–0.5)
EOSINOPHIL NFR BLD AUTO: 2.2 % — SIGNIFICANT CHANGE UP (ref 0–6)
GLUCOSE BLDC GLUCOMTR-MCNC: 102 MG/DL — HIGH (ref 70–99)
GLUCOSE BLDC GLUCOMTR-MCNC: 107 MG/DL — HIGH (ref 70–99)
GLUCOSE BLDC GLUCOMTR-MCNC: 93 MG/DL — SIGNIFICANT CHANGE UP (ref 70–99)
GLUCOSE BLDC GLUCOMTR-MCNC: 96 MG/DL — SIGNIFICANT CHANGE UP (ref 70–99)
GLUCOSE SERPL-MCNC: 87 MG/DL — SIGNIFICANT CHANGE UP (ref 70–99)
HCT VFR BLD CALC: 27.5 % — LOW (ref 34.5–45)
HGB BLD-MCNC: 7.7 G/DL — LOW (ref 11.5–15.5)
IANC: 8.14 K/UL — SIGNIFICANT CHANGE UP (ref 1.5–8.5)
IMM GRANULOCYTES NFR BLD AUTO: 7.8 % — HIGH (ref 0–1.5)
LYMPHOCYTES # BLD AUTO: 1.6 K/UL — SIGNIFICANT CHANGE UP (ref 1–3.3)
LYMPHOCYTES # BLD AUTO: 13.2 % — SIGNIFICANT CHANGE UP (ref 13–44)
MAGNESIUM SERPL-MCNC: 1.2 MG/DL — LOW (ref 1.6–2.6)
MCHC RBC-ENTMCNC: 28 GM/DL — LOW (ref 32–36)
MCHC RBC-ENTMCNC: 28.2 PG — SIGNIFICANT CHANGE UP (ref 27–34)
MCV RBC AUTO: 100.7 FL — HIGH (ref 80–100)
MONOCYTES # BLD AUTO: 1.05 K/UL — HIGH (ref 0–0.9)
MONOCYTES NFR BLD AUTO: 8.7 % — SIGNIFICANT CHANGE UP (ref 2–14)
NEUTROPHILS # BLD AUTO: 8.14 K/UL — HIGH (ref 1.8–7.4)
NEUTROPHILS NFR BLD AUTO: 67.4 % — SIGNIFICANT CHANGE UP (ref 43–77)
NRBC # BLD: 0 /100 WBCS — SIGNIFICANT CHANGE UP
NRBC # FLD: 0.11 K/UL — HIGH
PHOSPHATE SERPL-MCNC: 3 MG/DL — SIGNIFICANT CHANGE UP (ref 2.5–4.5)
PLATELET # BLD AUTO: 253 K/UL — SIGNIFICANT CHANGE UP (ref 150–400)
POTASSIUM SERPL-MCNC: 3.3 MMOL/L — LOW (ref 3.5–5.3)
POTASSIUM SERPL-SCNC: 3.3 MMOL/L — LOW (ref 3.5–5.3)
PROT SERPL-MCNC: 5.8 G/DL — LOW (ref 6–8.3)
RBC # BLD: 2.73 M/UL — LOW (ref 3.8–5.2)
RBC # FLD: 20.9 % — HIGH (ref 10.3–14.5)
SODIUM SERPL-SCNC: 145 MMOL/L — SIGNIFICANT CHANGE UP (ref 135–145)
VANCOMYCIN FLD-MCNC: 1.7 UG/ML — SIGNIFICANT CHANGE UP
WBC # BLD: 12.08 K/UL — HIGH (ref 3.8–10.5)
WBC # FLD AUTO: 12.08 K/UL — HIGH (ref 3.8–10.5)

## 2020-12-25 PROCEDURE — 99291 CRITICAL CARE FIRST HOUR: CPT

## 2020-12-25 RX ORDER — AMLODIPINE BESYLATE 2.5 MG/1
5 TABLET ORAL DAILY
Refills: 0 | Status: DISCONTINUED | OUTPATIENT
Start: 2020-12-25 | End: 2021-03-19

## 2020-12-25 RX ORDER — METOPROLOL TARTRATE 50 MG
50 TABLET ORAL
Refills: 0 | Status: DISCONTINUED | OUTPATIENT
Start: 2020-12-25 | End: 2021-03-19

## 2020-12-25 RX ORDER — POTASSIUM CHLORIDE 20 MEQ
40 PACKET (EA) ORAL ONCE
Refills: 0 | Status: COMPLETED | OUTPATIENT
Start: 2020-12-25 | End: 2020-12-25

## 2020-12-25 RX ORDER — MAGNESIUM SULFATE 500 MG/ML
2 VIAL (ML) INJECTION ONCE
Refills: 0 | Status: COMPLETED | OUTPATIENT
Start: 2020-12-25 | End: 2020-12-25

## 2020-12-25 RX ADMIN — Medication 10 MILLIGRAM(S): at 13:29

## 2020-12-25 RX ADMIN — SENNA PLUS 15 MILLILITER(S): 8.6 TABLET ORAL at 05:31

## 2020-12-25 RX ADMIN — IMIPENEM AND CILASTATIN 100 MILLIGRAM(S): 250; 250 INJECTION, POWDER, FOR SOLUTION INTRAVENOUS at 18:07

## 2020-12-25 RX ADMIN — Medication 40 MILLIEQUIVALENT(S): at 06:34

## 2020-12-25 RX ADMIN — METHADONE HYDROCHLORIDE 10 MILLIGRAM(S): 40 TABLET ORAL at 21:56

## 2020-12-25 RX ADMIN — Medication 50 GRAM(S): at 06:34

## 2020-12-25 RX ADMIN — Medication 10 MILLIGRAM(S): at 21:56

## 2020-12-25 RX ADMIN — Medication 10 MILLIGRAM(S): at 05:30

## 2020-12-25 RX ADMIN — AMLODIPINE BESYLATE 5 MILLIGRAM(S): 2.5 TABLET ORAL at 13:29

## 2020-12-25 RX ADMIN — CHLORHEXIDINE GLUCONATE 1 APPLICATION(S): 213 SOLUTION TOPICAL at 05:34

## 2020-12-25 RX ADMIN — PANTOPRAZOLE SODIUM 40 MILLIGRAM(S): 20 TABLET, DELAYED RELEASE ORAL at 12:48

## 2020-12-25 RX ADMIN — IMIPENEM AND CILASTATIN 100 MILLIGRAM(S): 250; 250 INJECTION, POWDER, FOR SOLUTION INTRAVENOUS at 02:45

## 2020-12-25 RX ADMIN — POLYETHYLENE GLYCOL 3350 17 GRAM(S): 17 POWDER, FOR SOLUTION ORAL at 12:48

## 2020-12-25 RX ADMIN — CHLORHEXIDINE GLUCONATE 15 MILLILITER(S): 213 SOLUTION TOPICAL at 17:31

## 2020-12-25 RX ADMIN — CHLORHEXIDINE GLUCONATE 15 MILLILITER(S): 213 SOLUTION TOPICAL at 05:33

## 2020-12-25 RX ADMIN — METHADONE HYDROCHLORIDE 10 MILLIGRAM(S): 40 TABLET ORAL at 13:29

## 2020-12-25 RX ADMIN — ENOXAPARIN SODIUM 100 MILLIGRAM(S): 100 INJECTION SUBCUTANEOUS at 17:31

## 2020-12-25 RX ADMIN — HUMAN INSULIN 5 UNIT(S): 100 INJECTION, SUSPENSION SUBCUTANEOUS at 12:52

## 2020-12-25 RX ADMIN — ENOXAPARIN SODIUM 100 MILLIGRAM(S): 100 INJECTION SUBCUTANEOUS at 05:30

## 2020-12-25 RX ADMIN — HUMAN INSULIN 5 UNIT(S): 100 INJECTION, SUSPENSION SUBCUTANEOUS at 05:32

## 2020-12-25 RX ADMIN — HUMAN INSULIN 5 UNIT(S): 100 INJECTION, SUSPENSION SUBCUTANEOUS at 23:09

## 2020-12-25 RX ADMIN — METHADONE HYDROCHLORIDE 10 MILLIGRAM(S): 40 TABLET ORAL at 05:30

## 2020-12-25 RX ADMIN — Medication 50 MILLIGRAM(S): at 17:35

## 2020-12-25 RX ADMIN — HUMAN INSULIN 5 UNIT(S): 100 INJECTION, SUSPENSION SUBCUTANEOUS at 18:07

## 2020-12-25 NOTE — PROGRESS NOTE ADULT - SUBJECTIVE AND OBJECTIVE BOX
Interval Events:  -remains off all sedation   -continues to have no mental status     HPI:  Patient is a 61yo F w/ AARON, peritonitis s/p Oral's procedure and ileostomy (reversed 2011), HTN, prior DVT/PE, ?Asthma admitted on 11/18/2020 to [facility] after presenting for  productive cough  w/ SOB x9 days and diarrhea x7 days and fever x1 day. Patient was found to be COVID + on 11/17/2020 and completed remdesivir 11/18-11/22. Patient was intubated for airway protection on 11/23/2020. Patient has required proning (last 12/4/2020). Hospital course c/b by ATN, requiring CVVHD now on intermittent HD, unresponsive to bumex challenge last HD 12/8. Course also c/b Stenotrophomonas (12/11, completed Levaquin x7 days), MSSA/P. mirablis bacteremia (on Cefazolin, potentially due to urine vs line-associated and MSSA in sputum due to PNA), also with lung abscesses on CT chest on 12/5.  Patient currently on Volume AC 30/350/8/35% on Precedex. Patient intermittently opens eyes but is not yet responsive. Transferred over to [facility] on 12/10 to offload [facility] COVID ICU    (10 Dec 2020 14:13)      REVIEW OF SYSTEMS:    [x] Unable to assess ROS because patient is trached/unresponsive    OBJECTIVE:  ICU Vital Signs Last 24 Hrs  T(C): 37.2 (25 Dec 2020 07:00), Max: 37.9 (24 Dec 2020 19:00)  T(F): 98.9 (25 Dec 2020 07:00), Max: 100.3 (24 Dec 2020 19:00)  HR: 103 (25 Dec 2020 11:02) (82 - 104)  BP: --  BP(mean): --  ABP: 138/61 (25 Dec 2020 10:00) (137/63 - 167/68)  ABP(mean): 90 (25 Dec 2020 07:00) (60 - 102)  RR: 32 (25 Dec 2020 10:00) (32 - 32)  SpO2: 94% (25 Dec 2020 11:02) (93% - 100%)    Mode: AC/ CMV (Assist Control/ Continuous Mandatory Ventilation), RR (machine): 32, TV (machine): 370, FiO2: 40, PEEP: 8, ITime: 0.66, MAP: 20, PIP: 40    12-24 @ 07:01  -  12-25 @ 07:00  --------------------------------------------------------  IN: 660 mL / OUT: 2405 mL / NET: -1745 mL      CAPILLARY BLOOD GLUCOSE      POCT Blood Glucose.: 96 mg/dL (25 Dec 2020 05:13)    Physical Exam:   HEENT: + PERRLA, EOMI, no drainage or redness  Neck: supple,  No JVD  Respiratory: Trached and Ventilator assisted breath rhonchi bilaterally to auscultation, no accessory muscle use noted  Cardiovascular: Regular rate, regular rhythm, normal S1, S2; no murmurs or rub  Gastrointestinal: obese, soft, non-tender, non distended, no hepatosplenomegaly, normal bowel sounds  Extremities: + 2 peripheral edema, no cyanosis, no clubbing   Vascular: Equal and normal pulses: 2+ peripheral pulses throughout  Neurological: awake not responsive to commands  Skin: DTI to chin area, warm, dry, well perfused      HOSPITAL MEDICATIONS:  Standing Meds:  amLODIPine   Tablet 5 milliGRAM(s) Oral daily  chlorhexidine 0.12% Liquid 15 milliLiter(s) Oral Mucosa every 12 hours  chlorhexidine 4% Liquid 1 Application(s) Topical <User Schedule>  diazepam    Tablet 10 milliGRAM(s) Oral three times a day  enoxaparin Injectable 100 milliGRAM(s) SubCutaneous every 12 hours  imipenem/cilastatin  IVPB 500 milliGRAM(s) IV Intermittent every 12 hours  insulin lispro (ADMELOG) corrective regimen sliding scale   SubCutaneous every 6 hours  insulin NPH human recombinant 5 Unit(s) SubCutaneous every 6 hours  methadone   Solution 10 milliGRAM(s) Oral three times a day  metoprolol tartrate 50 milliGRAM(s) Oral two times a day  pantoprazole  Injectable 40 milliGRAM(s) IV Push daily  polyethylene glycol 3350 17 Gram(s) Oral daily  senna Syrup 15 milliLiter(s) Oral two times a day      PRN Meds:      LABS:                        7.7    12.08 )-----------( 253      ( 25 Dec 2020 05:27 )             27.5     Hgb Trend: 7.7<--, 7.6<--, 7.3<--, 6.7<--, 7.0<--  12-25    145  |  104  |  40<H>  ----------------------------<  87  3.3<L>   |  32<H>  |  0.81    Ca    8.3<L>      25 Dec 2020 05:27  Phos  3.0     12-25  Mg     1.2     12-25    TPro  5.8<L>  /  Alb  1.9<L>  /  TBili  0.6  /  DBili  x   /  AST  15  /  ALT  <5<L>  /  AlkPhos  74  12-25    Creatinine Trend: 0.81<--, 1.01<--, 1.18<--, 1.34<--, 1.53<--, 1.61<--  PT/INR - ( 24 Dec 2020 05:24 )   PT: 13.8 sec;   INR: 1.22 ratio         PTT - ( 25 Dec 2020 05:27 )  PTT:29.7 sec          MICROBIOLOGY:     RADIOLOGY:  [ ] Reviewed and interpreted by me

## 2020-12-25 NOTE — CHART NOTE - NSCHARTNOTEFT_GEN_A_CORE
Patient currently eligible to RCU, spoke with on call infection control nurse Nafisa Mccallum and stated that patient is clear from contact/airborne 21 days of initial positive. Patient was positive on 11/17 and had a negative COVID-19 on 12/9 making the patient cleared to go to the RCU. Patient currently eligible to RCU, spoke with on call infection control nurse Nafisa Mccallum and stated that patient is clear from contact/airborne 21 days of initial positive. Patient was positive on 11/17 and had a negative COVID-19 on 12/9 making the patient cleared to go to the RCU. Any questions, feel free to call the infection control on call number at [phone].

## 2020-12-25 NOTE — PROGRESS NOTE ADULT - ATTENDING COMMENTS
60 F who presented to [facility] 11/18 (transferred to [facility] 12/10) with dyspnea. Tested positive for COVID-19+ 11/17 and intubated 11/23 for acute hypoxemic respiratory failure /  ARDS. Course c/b lung abscess, MSSA bacteremia, and stenotrophomonas in sputum. Currently on imipenem/cilastatin. Also developed an ARTEMIO requiring HD. Urine output is improved and she no longer requires HD. Martha was pulled 12/21. She is s/p tracheostomy 12/18. Will need to reach out to general surgery regarding PEG placement. Restarted A/C (indicated for history of VTE). She is now tapered off methadone and diazepam although remains encephalopathic. CTH 12/12 showed no acute pathology. Will order spot EEG for further evaluation. Restarted some of her home antihypertensive medications today. Remainder of plan as above.

## 2020-12-25 NOTE — PROGRESS NOTE ADULT - ASSESSMENT
This is a 60 year old Female with a PMH of HTN, DVT on Xarelto, peritonitis s/p Oral's procedure and ileostomy (reversed 2011), AARON who presented to [facility] on 11/18  w/ fevers found to have COVID-19, intubated 11/23, course complicated by superimposed PNA and bacteremia with Stenotrophomonas (12/11, completed Levaquin x7 days), MSSA/P. mirablis bacteremia (on Cefazolin, potentially due to urine vs line-associated and MSSA in sputum due to PNA), also with lung abscesses on CT chest on 12/5 , ARTEMIO/ATN requiring CRRT and HD. Now unable to wean from ventilator and transferred from [facility] on 12/10 to offload COVID unit. Unable to wean from ventilator, s/p tracheostmy with ctsx 12/18 planned for PEG 12/24 but unable to perform due to large ventral hernia     #Neuro  Alert & oriented x3 @baseline.   -off all IV sedation, c/w altered mental status  -D/C'd standing methadone and valium   - CTH negative   - last ammonia level was 66   -plan for spot EEG and possible MRI     #Resp  Acute hypoxemic respiratory failure 2/2 covid-19 PNA, intubated 11/23. Course complicated by MSSA pneumonia, stenotrophomonas, p. mirabalis, and lung abscess on CT chest from 12/5. Unable to wean from ventilator now trached by ctx on 12/18. Now with significant positional air leak from tracheotomy    - c/w AC 32/370/8/40%  still unable to tolerate PS trial   - Trached by CT surg 12/18. CT surg to remove trach sutures on 1/1/21       #CV  Hx of DVT on Xarelto was on heparin gtt but was stopped due to low H/H. Intermittently requiring norepinephrine most likely from vasoplegic shock from sedation medications, now hypertensive  -will restart home metoprolol and amlodipine and monitor BP    - CT negative for PE    #GI  - Continue TF, tolerating well  - Continue bowel regimen  - c/w PPI   - Per Dr. Cruz from CT surge, Due to Ventral Hernia, pt is not a candidate for Percutaneous gastrostomy. Will need to consult general surgery for possible open gastrostomy tube     #  ARTEMIO 2/2 ATN requiring CRRT, then Bumex challenge and now intermittent HD, finished trial with Bumex yesterday with okay response. Started on Bumex gtt now making good UO. Bumex gtt d/c'ed.   -last HD 12/17, Bumex gtt d/c'ed. Now with renal recovery and making adequate UO not requiring HD   - will trend electrolytes and replete as necessary  - Rivera in place  -strict I’s and O’s  -will assess need for prn Bumex     #ID  Covid 19 pneumonia, MSSA bacteremia/pneumonia, cavitary consolidation on CT chest.  - s/p remdesivir and dexamethasone   - + Sputum/blood MSSA, + P mirablis in blood, +Stenotrophomonas in sputum   - Was previously on Cefazolin for MSSA bacteremia. Pt continues with fevers, ABX broaden to Fadia and Vanco on 12/11  - s/p Levofloxacin   - caspofungin discontinued, no fungal growth.   - Blood cx from 12/7 NGTD. 12/12 sputum with gram negative rods now speciated to Stenotrophomonas   -will D/C meropenem (12/11-12/16)   - ID following  -c/w imipenem (12/16- 12/28) 500mg q12h x 2 weeks for cavitation and MSSA,     #Heme  Hx DVT on Xarelto Anemic 12/13 with no evidence of bleeding s/p 1u PRBC. Drop in Hgb on 12/23 with no signs of bleeding requiring 1u PRBC and heparin gtt was D/C'd   -Anticoagulation with Lovenox 100BID    -continue to trend CBC/ PTT  -will assess need for blood if Hgb continues to drop     #Endo  - ISS q6hrs  - NPH q6hrs      #Dispo: Pt stable for transfer to RCU

## 2020-12-26 LAB
ALBUMIN SERPL ELPH-MCNC: 2.3 G/DL — LOW (ref 3.3–5)
ALP SERPL-CCNC: 84 U/L — SIGNIFICANT CHANGE UP (ref 40–120)
ALT FLD-CCNC: <5 U/L — SIGNIFICANT CHANGE UP (ref 4–33)
ANION GAP SERPL CALC-SCNC: 6 MMOL/L — LOW (ref 7–14)
APTT BLD: 33.2 SEC — SIGNIFICANT CHANGE UP (ref 27–36.3)
AST SERPL-CCNC: 16 U/L — SIGNIFICANT CHANGE UP (ref 4–32)
BASOPHILS # BLD AUTO: 0.14 K/UL — SIGNIFICANT CHANGE UP (ref 0–0.2)
BASOPHILS NFR BLD AUTO: 1.1 % — SIGNIFICANT CHANGE UP (ref 0–2)
BILIRUB SERPL-MCNC: 0.5 MG/DL — SIGNIFICANT CHANGE UP (ref 0.2–1.2)
BLOOD GAS ARTERIAL - LYTES,HGB,ICA,LACT RESULT: SIGNIFICANT CHANGE UP
BUN SERPL-MCNC: 30 MG/DL — HIGH (ref 7–23)
CALCIUM SERPL-MCNC: 8.5 MG/DL — SIGNIFICANT CHANGE UP (ref 8.4–10.5)
CHLORIDE SERPL-SCNC: 108 MMOL/L — HIGH (ref 98–107)
CO2 SERPL-SCNC: 35 MMOL/L — HIGH (ref 22–31)
CREAT SERPL-MCNC: 0.68 MG/DL — SIGNIFICANT CHANGE UP (ref 0.5–1.3)
EOSINOPHIL # BLD AUTO: 0.26 K/UL — SIGNIFICANT CHANGE UP (ref 0–0.5)
EOSINOPHIL NFR BLD AUTO: 2.1 % — SIGNIFICANT CHANGE UP (ref 0–6)
GLUCOSE BLDC GLUCOMTR-MCNC: 112 MG/DL — HIGH (ref 70–99)
GLUCOSE BLDC GLUCOMTR-MCNC: 117 MG/DL — HIGH (ref 70–99)
GLUCOSE BLDC GLUCOMTR-MCNC: 119 MG/DL — HIGH (ref 70–99)
GLUCOSE BLDC GLUCOMTR-MCNC: 122 MG/DL — HIGH (ref 70–99)
GLUCOSE BLDC GLUCOMTR-MCNC: 96 MG/DL — SIGNIFICANT CHANGE UP (ref 70–99)
GLUCOSE SERPL-MCNC: 118 MG/DL — HIGH (ref 70–99)
HCT VFR BLD CALC: 27.8 % — LOW (ref 34.5–45)
HGB BLD-MCNC: 8.1 G/DL — LOW (ref 11.5–15.5)
IANC: 8.42 K/UL — SIGNIFICANT CHANGE UP (ref 1.5–8.5)
IMM GRANULOCYTES NFR BLD AUTO: 5.7 % — HIGH (ref 0–1.5)
INR BLD: 1.28 RATIO — HIGH (ref 0.88–1.16)
LYMPHOCYTES # BLD AUTO: 1.73 K/UL — SIGNIFICANT CHANGE UP (ref 1–3.3)
LYMPHOCYTES # BLD AUTO: 14 % — SIGNIFICANT CHANGE UP (ref 13–44)
MAGNESIUM SERPL-MCNC: 1.5 MG/DL — LOW (ref 1.6–2.6)
MCHC RBC-ENTMCNC: 29.1 GM/DL — LOW (ref 32–36)
MCHC RBC-ENTMCNC: 29.3 PG — SIGNIFICANT CHANGE UP (ref 27–34)
MCV RBC AUTO: 100.7 FL — HIGH (ref 80–100)
MONOCYTES # BLD AUTO: 1.14 K/UL — HIGH (ref 0–0.9)
MONOCYTES NFR BLD AUTO: 9.2 % — SIGNIFICANT CHANGE UP (ref 2–14)
NEUTROPHILS # BLD AUTO: 8.42 K/UL — HIGH (ref 1.8–7.4)
NEUTROPHILS NFR BLD AUTO: 67.9 % — SIGNIFICANT CHANGE UP (ref 43–77)
NRBC # BLD: 1 /100 WBCS — SIGNIFICANT CHANGE UP
NRBC # FLD: 0.12 K/UL — HIGH
PHOSPHATE SERPL-MCNC: 2.7 MG/DL — SIGNIFICANT CHANGE UP (ref 2.5–4.5)
PLATELET # BLD AUTO: 268 K/UL — SIGNIFICANT CHANGE UP (ref 150–400)
POTASSIUM SERPL-MCNC: 3.5 MMOL/L — SIGNIFICANT CHANGE UP (ref 3.5–5.3)
POTASSIUM SERPL-SCNC: 3.5 MMOL/L — SIGNIFICANT CHANGE UP (ref 3.5–5.3)
PROT SERPL-MCNC: 6.4 G/DL — SIGNIFICANT CHANGE UP (ref 6–8.3)
PROTHROM AB SERPL-ACNC: 14.4 SEC — HIGH (ref 10.6–13.6)
RBC # BLD: 2.76 M/UL — LOW (ref 3.8–5.2)
RBC # FLD: 21.2 % — HIGH (ref 10.3–14.5)
SODIUM SERPL-SCNC: 149 MMOL/L — HIGH (ref 135–145)
WBC # BLD: 12.4 K/UL — HIGH (ref 3.8–10.5)
WBC # FLD AUTO: 12.4 K/UL — HIGH (ref 3.8–10.5)

## 2020-12-26 PROCEDURE — 31622 DX BRONCHOSCOPE/WASH: CPT | Mod: GC

## 2020-12-26 PROCEDURE — 99291 CRITICAL CARE FIRST HOUR: CPT

## 2020-12-26 RX ORDER — FENTANYL CITRATE 50 UG/ML
50 INJECTION INTRAVENOUS ONCE
Refills: 0 | Status: DISCONTINUED | OUTPATIENT
Start: 2020-12-26 | End: 2020-12-26

## 2020-12-26 RX ORDER — MAGNESIUM SULFATE 500 MG/ML
1 VIAL (ML) INJECTION ONCE
Refills: 0 | Status: COMPLETED | OUTPATIENT
Start: 2020-12-26 | End: 2020-12-26

## 2020-12-26 RX ORDER — ONDANSETRON 8 MG/1
8 TABLET, FILM COATED ORAL ONCE
Refills: 0 | Status: COMPLETED | OUTPATIENT
Start: 2020-12-26 | End: 2020-12-26

## 2020-12-26 RX ORDER — POTASSIUM CHLORIDE 20 MEQ
40 PACKET (EA) ORAL ONCE
Refills: 0 | Status: COMPLETED | OUTPATIENT
Start: 2020-12-26 | End: 2020-12-26

## 2020-12-26 RX ORDER — MIDAZOLAM HYDROCHLORIDE 1 MG/ML
4 INJECTION, SOLUTION INTRAMUSCULAR; INTRAVENOUS ONCE
Refills: 0 | Status: DISCONTINUED | OUTPATIENT
Start: 2020-12-26 | End: 2020-12-26

## 2020-12-26 RX ORDER — METHADONE HYDROCHLORIDE 40 MG/1
10 TABLET ORAL EVERY 8 HOURS
Refills: 0 | Status: DISCONTINUED | OUTPATIENT
Start: 2020-12-26 | End: 2020-12-27

## 2020-12-26 RX ORDER — FUROSEMIDE 40 MG
40 TABLET ORAL ONCE
Refills: 0 | Status: COMPLETED | OUTPATIENT
Start: 2020-12-26 | End: 2020-12-26

## 2020-12-26 RX ORDER — METHADONE HYDROCHLORIDE 40 MG/1
20 TABLET ORAL EVERY 8 HOURS
Refills: 0 | Status: DISCONTINUED | OUTPATIENT
Start: 2020-12-26 | End: 2020-12-26

## 2020-12-26 RX ADMIN — HUMAN INSULIN 5 UNIT(S): 100 INJECTION, SUSPENSION SUBCUTANEOUS at 14:36

## 2020-12-26 RX ADMIN — MIDAZOLAM HYDROCHLORIDE 4 MILLIGRAM(S): 1 INJECTION, SOLUTION INTRAMUSCULAR; INTRAVENOUS at 05:02

## 2020-12-26 RX ADMIN — CHLORHEXIDINE GLUCONATE 15 MILLILITER(S): 213 SOLUTION TOPICAL at 18:17

## 2020-12-26 RX ADMIN — CHLORHEXIDINE GLUCONATE 15 MILLILITER(S): 213 SOLUTION TOPICAL at 05:12

## 2020-12-26 RX ADMIN — Medication 40 MILLIGRAM(S): at 10:15

## 2020-12-26 RX ADMIN — IMIPENEM AND CILASTATIN 100 MILLIGRAM(S): 250; 250 INJECTION, POWDER, FOR SOLUTION INTRAVENOUS at 14:44

## 2020-12-26 RX ADMIN — Medication 10 MILLIGRAM(S): at 21:16

## 2020-12-26 RX ADMIN — HUMAN INSULIN 5 UNIT(S): 100 INJECTION, SUSPENSION SUBCUTANEOUS at 23:54

## 2020-12-26 RX ADMIN — IMIPENEM AND CILASTATIN 100 MILLIGRAM(S): 250; 250 INJECTION, POWDER, FOR SOLUTION INTRAVENOUS at 02:25

## 2020-12-26 RX ADMIN — FENTANYL CITRATE 50 MICROGRAM(S): 50 INJECTION INTRAVENOUS at 05:04

## 2020-12-26 RX ADMIN — ONDANSETRON 8 MILLIGRAM(S): 8 TABLET, FILM COATED ORAL at 04:28

## 2020-12-26 RX ADMIN — Medication 100 GRAM(S): at 05:14

## 2020-12-26 RX ADMIN — METHADONE HYDROCHLORIDE 10 MILLIGRAM(S): 40 TABLET ORAL at 21:16

## 2020-12-26 RX ADMIN — Medication 10 MILLIGRAM(S): at 05:03

## 2020-12-26 RX ADMIN — FENTANYL CITRATE 50 MICROGRAM(S): 50 INJECTION INTRAVENOUS at 05:02

## 2020-12-26 RX ADMIN — Medication 50 MILLIGRAM(S): at 05:03

## 2020-12-26 RX ADMIN — AMLODIPINE BESYLATE 5 MILLIGRAM(S): 2.5 TABLET ORAL at 05:03

## 2020-12-26 RX ADMIN — METHADONE HYDROCHLORIDE 10 MILLIGRAM(S): 40 TABLET ORAL at 14:44

## 2020-12-26 RX ADMIN — Medication 50 MILLIGRAM(S): at 18:22

## 2020-12-26 RX ADMIN — POLYETHYLENE GLYCOL 3350 17 GRAM(S): 17 POWDER, FOR SOLUTION ORAL at 11:36

## 2020-12-26 RX ADMIN — FENTANYL CITRATE 50 MICROGRAM(S): 50 INJECTION INTRAVENOUS at 04:28

## 2020-12-26 RX ADMIN — PANTOPRAZOLE SODIUM 40 MILLIGRAM(S): 20 TABLET, DELAYED RELEASE ORAL at 11:35

## 2020-12-26 RX ADMIN — Medication 10 MILLIGRAM(S): at 14:44

## 2020-12-26 RX ADMIN — HUMAN INSULIN 5 UNIT(S): 100 INJECTION, SUSPENSION SUBCUTANEOUS at 18:25

## 2020-12-26 RX ADMIN — ENOXAPARIN SODIUM 100 MILLIGRAM(S): 100 INJECTION SUBCUTANEOUS at 05:14

## 2020-12-26 RX ADMIN — Medication 40 MILLIEQUIVALENT(S): at 11:35

## 2020-12-26 RX ADMIN — CHLORHEXIDINE GLUCONATE 1 APPLICATION(S): 213 SOLUTION TOPICAL at 05:13

## 2020-12-26 RX ADMIN — METHADONE HYDROCHLORIDE 20 MILLIGRAM(S): 40 TABLET ORAL at 05:03

## 2020-12-26 RX ADMIN — ENOXAPARIN SODIUM 100 MILLIGRAM(S): 100 INJECTION SUBCUTANEOUS at 18:18

## 2020-12-26 RX ADMIN — SENNA PLUS 15 MILLILITER(S): 8.6 TABLET ORAL at 18:18

## 2020-12-26 NOTE — PROGRESS NOTE ADULT - ASSESSMENT
This is a 60 year old Female with a PMH of HTN, DVT on Xarelto, peritonitis s/p Oral's procedure and ileostomy (reversed 2011), AAORN who presented to [facility] on 11/18  w/ fevers found to have COVID-19, intubated 11/23, course complicated by superimposed PNA and bacteremia with Stenotrophomonas (12/11, completed Levaquin x7 days), MSSA/P. mirablis bacteremia (on Cefazolin, potentially due to urine vs line-associated and MSSA in sputum due to PNA), also with lung abscesses on CT chest on 12/5 , ARTEMIO/ATN requiring CRRT and HD. Now unable to wean from ventilator and transferred from [facility] on 12/10 to offload COVID unit. Unable to wean from ventilator, s/p tracheostmy with ctsx 12/18 planned for PEG 12/24 but unable to perform due to large ventral hernia     #Neuro  Alert & oriented x3 @baseline.   -off all IV sedation, c/w altered mental status  - standing methadone and valium at 10mg TID  - CTH negative   - last ammonia level was 66   -plan for spot EEG and possible MRI     #Resp  Acute hypoxemic respiratory failure 2/2 covid-19 PNA, intubated 11/23. Course complicated by MSSA pneumonia, stenotrophomonas, p. mirabalis, and lung abscess on CT chest from 12/5. Unable to wean from ventilator now trached by ctx on 12/18. Now with significant positional air leak from tracheotomy    - c/w AC 32/370/8/40%  still unable to tolerate PS trial   - Trached by CT surg 12/18. CT surg to remove trach sutures on 1/1/21  - Pt noted to have large leak. Discussed plan for possible trach upsize with CT surge. CT surge to see patient.       #CV  Hx of DVT on Xarelto was on heparin gtt but was stopped due to low H/H. Intermittently requiring norepinephrine most likely from vasoplegic shock from sedation medications, now hypertensive  -will restart home metoprolol and amlodipine and monitor BP    - CT negative for PE    #GI  - Continue TF, tolerating well  - Continue bowel regimen  - c/w PPI   - Per Dr. Cruz from CT surge, Due to Ventral Hernia, pt is not a candidate for Percutaneous gastrostomy. Will need to consult general surgery for possible open gastrostomy tube     #  ARTEMIO 2/2 ATN requiring CRRT, then Bumex challenge and now intermittent HD, finished trial with Bumex yesterday with okay response. Started on Bumex gtt now making good UO. Bumex gtt d/c'ed.   -last HD 12/17, Bumex gtt d/c'ed. Now with renal recovery and making adequate UO not requiring HD   - will trend electrolytes and replete as necessary  - Rivera in place  -strict I’s and O’s  - urine output decreased today, will give Lasix 40mg IVP x1    #ID  Covid 19 pneumonia, MSSA bacteremia/pneumonia, cavitary consolidation on CT chest.  - s/p remdesivir and dexamethasone   - + Sputum/blood MSSA, + P mirablis in blood, +Stenotrophomonas in sputum   - Was previously on Cefazolin for MSSA bacteremia. Pt continues with fevers, ABX broaden to Fadia and Vanco on 12/11  - s/p Levofloxacin   - caspofungin discontinued, no fungal growth.   - Blood cx from 12/7 NGTD. 12/12 sputum with gram negative rods now speciated to Stenotrophomonas   -will D/C meropenem (12/11-12/16)   - ID following  -c/w imipenem (12/16- 12/28) 500mg q12h x 2 weeks for cavitation and MSSA,     #Heme  Hx DVT on Xarelto Anemic 12/13 with no evidence of bleeding s/p 1u PRBC. Drop in Hgb on 12/23 with no signs of bleeding requiring 1u PRBC and heparin gtt was D/C'd   -Anticoagulation with Lovenox 100BID    -continue to trend CBC/ PTT  -will assess need for blood if Hgb continues to drop     #Endo  - ISS q6hrs  - NPH q6hrs      #Dispo: Pt stable for transfer to RCU. COVID 19 swab negative on 12/9. Will follow up with infection control regarding eligibility

## 2020-12-26 NOTE — PROGRESS NOTE ADULT - SUBJECTIVE AND OBJECTIVE BOX
Significant recent/past 24 hr events:     Subjective:    Review of Systems         Unable to obtain due to: intubated & sedated altered mental status _______________    Constitutional: denies fever, chills, general malaise, fatigue, weight loss, weight gain, diaphoresis   HEENT: denies dry mouth, sore throat, runny nose, photophobia, blurry vision, double vision, discharge, eye pain, difficulty hearing, vertigo, dysphagia, epistaxis, recent dental work    Neck: denies pain or stiffness  Respiratory: denies SOB, MCINTOSH, cough, phlegm, wheezing, hemoptysis  Cardiovascular: denies CP, palpitations, edema, diaphoresis   Gastrointestinal: denies nausea, vomiting or hematemesis, diarrhea, constipation, abdominal or epigastric pain, melena or hematochezia   Genitourinary: denies dysuria, frequency, urgency, incontinence, hematuria   Skin: denies rash, hives, itching, burning, masses  Musculoskeletal: denies myalgias, arthritis, joint swelling, muscle weakness  Neurologic: denies syncope, LOC, headache, weakness, dizziness, parasthesias, numbness, seizures, confusion, dementia   Psychiatric: denies feeling anxious, depressed, suicidal, homicidal  Endocrine: denies increased fingerstick glucoses, cold or heat intolerance, polydipsia, polyuria, polyphagia, hair loss  Hematology/Oncology: denies bruising, tender or enlarged lymph nodes   Allergy/immunologic: denies hives or eczema  ROS negative x 10 systems except as noted above      Patient is a 60y old  Female who presents with a chief complaint of Transfer from [facility] (25 Dec 2020 07:40)    HPI:  Patient is a 61yo F w/ AARON, peritonitis s/p Oral's procedure and ileostomy (reversed ), HTN, prior DVT/PE, ?Asthma admitted on 2020 to [facility] after presenting for  productive cough  w/ SOB x9 days and diarrhea x7 days and fever x1 day. Patient was found to be COVID + on 2020 and completed remdesivir -. Patient was intubated for airway protection on 2020. Patient has required proning (last 2020). Hospital course c/b by ATN, requiring CVVHD now on intermittent HD, unresponsive to bumex challenge last HD . Course also c/b Stenotrophomonas (, completed Levaquin x7 days), MSSA/P. mirablis bacteremia (on Cefazolin, potentially due to urine vs line-associated and MSSA in sputum due to PNA), also with lung abscesses on CT chest on .  Patient currently on Volume AC 30/350/8/35% on Precedex. Patient intermittently opens eyes but is not yet responsive. Transferred over to [facility] on 12/10 to offload [facility] COVID ICU    (10 Dec 2020 14:13)    PAST MEDICAL & SURGICAL HISTORY:  Pneumomediastinum    Umbilical hernia  Reduciable    Osteoarthritis of both knees, unspecified osteoarthritis type    AARON (Obstructive Sleep Apnea)  category II pt uses c/pap pt to bering to hospital/  OR booking notified    Pneumonia      GERD (Gastroesophageal Reflux Disease)    Asthma  diagnosed   on adbvair denies attacks    DVT (Deep Venous Thrombosis)  left leg 6 m s/p knee replacement in     HTN (Hypertension)    H/O ileostomy  Ileostomy Revesal     S/P LEEP      S/P Exploratory Laparotomy    peritonitis  s/p right knee replacement/ colon resection and ileostomy    S/P Colonoscopy      S/P Endoscopy   gerd    S/P  Section      History of Tubal Ligation  s/p c/section 2000    S/P Knee Surgery  2010      FAMILY HISTORY:  Family history of gastric cancer    Family history of breast cancer (Grandparent)        Vitals   ICU Vital Signs Last 24 Hrs  T(C): 36.2 (26 Dec 2020 08:00), Max: 38.4 (25 Dec 2020 20:00)  T(F): 97.1 (26 Dec 2020 08:00), Max: 101.2 (25 Dec 2020 20:00)  HR: 72 (26 Dec 2020 10:00) (68 - 122)  BP: --  BP(mean): --  ABP: 137/44 (26 Dec 2020 10:00) (95/49 - 165/69)  ABP(mean): 86 (26 Dec 2020 10:00) (68 - 112)  RR: 32 (26 Dec 2020 10:00) (32 - 34)  SpO2: 97% (26 Dec 2020 10:00) (93% - 98%)      Physical Exam:   Constitutional: NAD, well-groomed, well-developed  HEENT: PERRLA, EOMI, no drainage or redness  Neck: supple,  No JVD, Trachea midline  Back: Normal spine flexure, No CVA tenderness, No deformity or limitation of movement  Respiratory: Breath Sounds equal & clear bilaterally to auscultation, no accessory muscle use noted  Cardiovascular: Regular rate, regular rhythm, normal S1, S2; no murmurs or rub  Gastrointestinal: Soft, non-tender, non distended, no hepatosplenomegaly, normal bowel sounds  Extremities: HUANG x 4, no peripheral edema, no cyanosis, no clubbing   Vascular: Equal and normal pulses: 2+ peripheral pulses throughout  Neurological: A+O x 3; speech clear and intact; no sensory, motor  deficits, normal reflexes  Psychiatric: calm, normal mood, normal affect  Musculoskeletal: No joint swelling or deformity; no limitation of movement  Skin: warm, dry, well perfused, no rashes    VENT SETTINGS   Mode: AC/ CMV (Assist Control/ Continuous Mandatory Ventilation)  RR (machine): 32  TV (machine): 370  FiO2: 50  PEEP: 10  ITime: 0.8  MAP: 22  PIP: 41    ABG - ( 26 Dec 2020 02:39 )  pH, Arterial: 7.44  pH, Blood: x     /  pCO2: 53    /  pO2: 61    / HCO3: 35    / Base Excess: 11.4  /  SaO2: 91.5                I&O's Detail    25 Dec 2020 07:  -  26 Dec 2020 07:00  --------------------------------------------------------  IN:    Nepro: 360 mL  Total IN: 360 mL    OUT:    Indwelling Catheter - Urethral (mL): 1430 mL  Total OUT: 1430 mL    Total NET: -1070 mL      26 Dec 2020 07:01  -  26 Dec 2020 11:05  --------------------------------------------------------  IN:    Nepro: 60 mL  Total IN: 60 mL    OUT:    Indwelling Catheter - Urethral (mL): 35 mL  Total OUT: 35 mL    Total NET: 25 mL          LABS                        8.1    12.40 )-----------( 268      ( 26 Dec 2020 02:39 )             27.8         149<H>  |  108<H>  |  30<H>  ----------------------------<  118<H>  3.5   |  35<H>  |  0.68    Ca    8.5      26 Dec 2020 02:39  Phos  2.7       Mg     1.5         TPro  6.4  /  Alb  2.3<L>  /  TBili  0.5  /  DBili  x   /  AST  16  /  ALT  <5  /  AlkPhos  84      LIVER FUNCTIONS - ( 26 Dec 2020 02:39 )  Alb: 2.3 g/dL / Pro: 6.4 g/dL / ALK PHOS: 84 U/L / ALT: <5 U/L / AST: 16 U/L / GGT: x           PT/INR - ( 26 Dec 2020 02:39 )   PT: 14.4 sec;   INR: 1.28 ratio         PTT - ( 26 Dec 2020 02:39 )  PTT:33.2 sec          POCT Blood Glucose.: 122 mg/dL <H> (20 @ 04:58)  POCT Blood Glucose.: 102 mg/dL <H> (20 @ 23:05)  POCT Blood Glucose.: 93 mg/dL (20 @ 17:24)  POCT Blood Glucose.: 107 mg/dL <H> (20 @ 11:32)        MEDICATIONS  (STANDING):  amLODIPine   Tablet 5 milliGRAM(s) Oral daily  chlorhexidine 0.12% Liquid 15 milliLiter(s) Oral Mucosa every 12 hours  chlorhexidine 4% Liquid 1 Application(s) Topical <User Schedule>  diazepam    Tablet 10 milliGRAM(s) Oral three times a day  enoxaparin Injectable 100 milliGRAM(s) SubCutaneous every 12 hours  furosemide   Injectable 40 milliGRAM(s) IV Push once  imipenem/cilastatin  IVPB 500 milliGRAM(s) IV Intermittent every 12 hours  insulin lispro (ADMELOG) corrective regimen sliding scale   SubCutaneous every 6 hours  insulin NPH human recombinant 5 Unit(s) SubCutaneous every 6 hours  methadone   Solution 10 milliGRAM(s) Oral every 8 hours  metoprolol tartrate 50 milliGRAM(s) Oral two times a day  pantoprazole  Injectable 40 milliGRAM(s) IV Push daily  polyethylene glycol 3350 17 Gram(s) Oral daily  potassium chloride   Powder 40 milliEquivalent(s) Oral once  senna Syrup 15 milliLiter(s) Oral two times a day    MEDICATIONS  (PRN):      Allergies:  Kiwi (Unknown)  latex (Anaphylaxis)  latex (Unknown)  penicillin (Other)  penicillin (Unknown)  perfume  hives (Other)  potassium acetate (Other)  soap additives hives (Other)   Significant recent/past 24 hr events: No overnight events    Subjective:    Review of Systems       Unable to obtain due to: altered mental status        PAST MEDICAL & SURGICAL HISTORY:  Pneumomediastinum    Umbilical hernia  Reduciable    Osteoarthritis of both knees, unspecified osteoarthritis type    AARON (Obstructive Sleep Apnea)  category II pt uses c/pap pt to bering to hospital/  OR booking notified    Pneumonia      GERD (Gastroesophageal Reflux Disease)    Asthma  diagnosed   on adbvair denies attacks    DVT (Deep Venous Thrombosis)  left leg 6 m s/p knee replacement in     HTN (Hypertension)    H/O ileostomy  Ileostomy Revesal     S/P LEEP  2000    S/P Exploratory Laparotomy    peritonitis  s/p right knee replacement/ colon resection and ileostomy    S/P Colonoscopy      S/P Endoscopy   gerd    S/P  Section      History of Tubal Ligation  s/p c/section     S/P Knee Surgery  2010      FAMILY HISTORY:  Family history of gastric cancer    Family history of breast cancer (Grandparent)        Vitals   ICU Vital Signs Last 24 Hrs  T(C): 36.2 (26 Dec 2020 08:00), Max: 38.4 (25 Dec 2020 20:00)  T(F): 97.1 (26 Dec 2020 08:00), Max: 101.2 (25 Dec 2020 20:00)  HR: 72 (26 Dec 2020 10:00) (68 - 122)  ABP: 137/44 (26 Dec 2020 10:00) (95/49 - 165/69)  ABP(mean): 86 (26 Dec 2020 10:00) (68 - 112)  RR: 32 (26 Dec 2020 10:00) (32 - 34)  SpO2: 97% (26 Dec 2020 10:00) (93% - 98%)      Physical Exam:   HEENT: + PERRLA, EOMI, no drainage or redness  Neck: supple,  No JVD  Respiratory: Trached and Ventilator assisted breath rhonchi bilaterally to auscultation, no accessory muscle use noted  Cardiovascular: Regular rate, regular rhythm, normal S1, S2; no murmurs or rub  Gastrointestinal: obese, soft, non-tender, non distended, no hepatosplenomegaly, normal bowel sounds  Extremities: + 2 peripheral edema, no cyanosis, no clubbing   Vascular: Equal and normal pulses: 2+ peripheral pulses throughout  Neurological: awake not responsive to commands  Skin: DTI to chin area, warm, dry, well perfused    VENT SETTINGS   Mode: AC/ CMV (Assist Control/ Continuous Mandatory Ventilation)  RR (machine): 32  TV (machine): 370  FiO2: 50  PEEP: 10  ITime: 0.8  MAP: 22  PIP: 41    ABG - ( 26 Dec 2020 02:39 )  pH, Arterial: 7.44  pH, Blood: x     /  pCO2: 53    /  pO2: 61    / HCO3: 35    / Base Excess: 11.4  /  SaO2: 91.5                I&O's Detail    25 Dec 2020 07:01  -  26 Dec 2020 07:00  --------------------------------------------------------  IN:    Nepro: 360 mL  Total IN: 360 mL    OUT:    Indwelling Catheter - Urethral (mL): 1430 mL  Total OUT: 1430 mL    Total NET: -1070 mL      26 Dec 2020 07:01  -  26 Dec 2020 11:05  --------------------------------------------------------  IN:    Nepro: 60 mL  Total IN: 60 mL    OUT:    Indwelling Catheter - Urethral (mL): 35 mL  Total OUT: 35 mL    Total NET: 25 mL          LABS                        8.1    12.40 )-----------( 268      ( 26 Dec 2020 02:39 )             27.8         149<H>  |  108<H>  |  30<H>  ----------------------------<  118<H>  3.5   |  35<H>  |  0.68    Ca    8.5      26 Dec 2020 02:39  Phos  2.7       Mg     1.5         TPro  6.4  /  Alb  2.3<L>  /  TBili  0.5  /  DBili  x   /  AST  16  /  ALT  <5  /  AlkPhos  84      LIVER FUNCTIONS - ( 26 Dec 2020 02:39 )  Alb: 2.3 g/dL / Pro: 6.4 g/dL / ALK PHOS: 84 U/L / ALT: <5 U/L / AST: 16 U/L / GGT: x           PT/INR - ( 26 Dec 2020 02:39 )   PT: 14.4 sec;   INR: 1.28 ratio         PTT - ( 26 Dec 2020 02:39 )  PTT:33.2 sec          POCT Blood Glucose.: 122 mg/dL <H> (20 @ 04:58)  POCT Blood Glucose.: 102 mg/dL <H> (20 @ 23:05)  POCT Blood Glucose.: 93 mg/dL (20 @ 17:24)  POCT Blood Glucose.: 107 mg/dL <H> (20 @ 11:32)        MEDICATIONS  (STANDING):  amLODIPine   Tablet 5 milliGRAM(s) Oral daily  chlorhexidine 0.12% Liquid 15 milliLiter(s) Oral Mucosa every 12 hours  chlorhexidine 4% Liquid 1 Application(s) Topical <User Schedule>  diazepam    Tablet 10 milliGRAM(s) Oral three times a day  enoxaparin Injectable 100 milliGRAM(s) SubCutaneous every 12 hours  furosemide   Injectable 40 milliGRAM(s) IV Push once  imipenem/cilastatin  IVPB 500 milliGRAM(s) IV Intermittent every 12 hours  insulin lispro (ADMELOG) corrective regimen sliding scale   SubCutaneous every 6 hours  insulin NPH human recombinant 5 Unit(s) SubCutaneous every 6 hours  methadone   Solution 10 milliGRAM(s) Oral every 8 hours  metoprolol tartrate 50 milliGRAM(s) Oral two times a day  pantoprazole  Injectable 40 milliGRAM(s) IV Push daily  polyethylene glycol 3350 17 Gram(s) Oral daily  potassium chloride   Powder 40 milliEquivalent(s) Oral once  senna Syrup 15 milliLiter(s) Oral two times a day    MEDICATIONS  (PRN):      Allergies:  Kiwi (Unknown)  latex (Anaphylaxis)  latex (Unknown)  penicillin (Other)  penicillin (Unknown)  perfume  hives (Other)  potassium acetate (Other)  soap additives hives (Other)

## 2020-12-26 NOTE — CONSULT NOTE ADULT - SUBJECTIVE AND OBJECTIVE BOX
GENERAL SURGERY CONSULT NOTE  --------------------------------------------------------------------------------------------    HPI:  60F PMH AARON, HTN, DVT/PE in the past, asthma, h/o SBO 2/2 incarcerated ventral hernia s/p exlap SBR (), hemicolectomy and ileostomy & reversal, p/w a 9 day hx of cough productive of clear sputum associated with feelings of SOB, 7 day hx of diarrhea (2-4 episodes per day) and 1 day hx of fever. Patient admitted with COVID pneumonia. Course c/b worsening SOB and hypoxia not improved by CPAP. Patient was given remdesivir and decadron. RRT (), patient started on BIPAP for tachypnea, hypoxia worsened, RRT () for worsening hypoxic respiratory failure, patient intubated and transferred to 5ICU. Patient's course thus far c/b worsening COVID PNA, staph PNA (sputum + staph), bacteremia, worsening P/F ratio (failed proning). Patient underwent tracheostomy with thoracic surgery 20. Now general surgery being consulted for feeding access, given concern of multiple abdominal wall hernias.        PAST MEDICAL & SURGICAL HISTORY:  Pneumomediastinum    Umbilical hernia  Reduciable    Osteoarthritis of both knees, unspecified osteoarthritis type    AARON (Obstructive Sleep Apnea)  category II pt uses c/pap pt to bering to hospital/  OR booking notified    Pneumonia      GERD (Gastroesophageal Reflux Disease)    Asthma  diagnosed   on adbvair denies attacks    DVT (Deep Venous Thrombosis)  left leg 6 m s/p knee replacement in     HTN (Hypertension)    H/O ileostomy  Ileostomy Revesal     S/P LEEP      S/P Exploratory Laparotomy    peritonitis  s/p right knee replacement/ colon resection and ileostomy    S/P Colonoscopy      S/P Endoscopy   gerd    S/P  Section      History of Tubal Ligation  s/p c/section     S/P Knee Surgery  2010      FAMILY HISTORY:  Family history of gastric cancer    Family history of breast cancer (Grandparent)    [] Family history not pertinent as reviewed with the patient and family      ALLERGIES: Kiwi (Unknown)  latex (Anaphylaxis)  latex (Unknown)  penicillin (Other)  penicillin (Unknown)  perfume  hives (Other)  potassium acetate (Other)  soap additives hives (Other)  CURRENT MEDICATIONS  MEDICATIONS (STANDING):   amLODIPine   Tablet 5 milliGRAM(s) Oral daily  diazepam    Tablet 10 milliGRAM(s) Oral three times a day  enoxaparin Injectable 100 milliGRAM(s) SubCutaneous every 12 hours  imipenem/cilastatin  IVPB 500 milliGRAM(s) IV Intermittent every 12 hours  insulin lispro (ADMELOG) corrective regimen sliding scale   SubCutaneous every 6 hours  insulin NPH human recombinant 5 Unit(s) SubCutaneous every 6 hours  methadone   Solution 10 milliGRAM(s) Oral every 8 hours  metoprolol tartrate 50 milliGRAM(s) Oral two times a day  pantoprazole  Injectable 40 milliGRAM(s) IV Push daily  polyethylene glycol 3350 17 Gram(s) Oral daily  senna Syrup 15 milliLiter(s) Oral two times a day    MEDICATIONS (PRN):  --------------------------------------------------------------------------------------------    Vitals:   T(C): 36.2 (20 @ 08:00), Max: 38.4 (20 @ 20:00)  HR: 80 (20 @ 11:06) (68 - 122)  BP: --  RR: 32 (20 @ 10:00) (32 - 34)  SpO2: 95% (20 @ 11:06) (93% - 98%)  CAPILLARY BLOOD GLUCOSE    POCT Blood Glucose.: 112 mg/dL (26 Dec 2020 11:41)  POCT Blood Glucose.: 122 mg/dL (26 Dec 2020 04:58)  POCT Blood Glucose.: 102 mg/dL (25 Dec 2020 23:05)  POCT Blood Glucose.: 93 mg/dL (25 Dec 2020 17:24)     @ 07:01  -   @ 07:00  --------------------------------------------------------  IN:    Nepro: 360 mL  Total IN: 360 mL    OUT:    Indwelling Catheter - Urethral (mL): 1430 mL  Total OUT: 1430 mL    Total NET: -1070 mL     @ 07:01  -   @ 12:46  --------------------------------------------------------  IN:    Nepro: 60 mL  Total IN: 60 mL    OUT:    Indwelling Catheter - Urethral (mL): 35 mL  Total OUT: 35 mL    Total NET: 25 mL      PHYSICAL EXAM:   General: NAD, Lying in bed comfortably  Neuro: A+Ox0  HEENT: NC/AT, EOMI  Neck: Soft, supple  Cardio: RRR, nml S1/S2  Resp: On PEEP 10, FiO2 50%, breathing 32/min  GI/Abd: Obese, soft. Well-healed midline vertical incision. Palpable large ventral hernia.   Vascular: All 4 extremities warm.  Skin: Intact, no breakdown  Lymphatic/Nodes: No palpable lymphadenopathy  --------------------------------------------------------------------------------------------    LABS  CBC ( @ 02:39)                              8.1<L>                         12.40<H>  )----------------(  268        67.9  % Neutrophils, 14.0  % Lymphocytes, ANC: 8.42<H>                              27.8<L>  CBC ( @ 05:27)                              7.7<L>                         12.08<H>  )----------------(  253        67.4  % Neutrophils, 13.2  % Lymphocytes, ANC: 8.14<H>                              27.5<L>    BMP (:39)             149<H>  |  108<H>  |  30<H> 		Ca++ --      Ca 8.5                ---------------------------------( 118<H>		Mg 1.5<L>             3.5     |  35<H>   |  0.68  			Ph 2.7     BMP ( 05:27)             145     |  104     |  40<H> 		Ca++ --      Ca 8.3<L>             ---------------------------------( 87    		Mg 1.2<L>             3.3<L>  |  32<H>   |  0.81  			Ph 3.0       LFTs (:39)      TPro 6.4 / Alb 2.3<L> / TBili 0.5 / DBili -- / AST 16 / ALT <5 / AlkPhos 84  LFTs ( 05:27)      TPro 5.8<L> / Alb 1.9<L> / TBili 0.6 / DBili -- / AST 15 / ALT <5<L> / AlkPhos 74    Coags (:39)  aPTT 33.2 / INR 1.28<H> / PT 14.4<H>  Coags ( 05:27)  aPTT 29.7 / INR -- / PT --      ABG (:39)     7.44 / 53<H> / 61<L> / 35<H> / 11.4<H> / 91.5<L>%     Lactate:        --------------------------------------------------------------------------------------------    MICROBIOLOGY    -> .Blood Blood-Peripheral Culture ( @ 23:09)     NG    NG    No growth to date.      --------------------------------------------------------------------------------------------    IMAGING  < from: CT Abdomen and Pelvis w/ IV Cont (20 @ 22:02) >  FINDINGS:  CHEST:  LUNGS AND LARGE AIRWAYS: Interval placement of an endotracheal tube which terminates above the yuniel. Redemonstration of peripheral bilateral ground glass opacities. New large consolidation with central cavitation involving the right upper middle and lower lobes.  PLEURA: No pleural effusion.  VESSELS: Bilateral IJ approach central venous catheters terminate in the SVC.  HEART: Heart size is normal. No pericardial effusion.  MEDIASTINUM AND UGO: No lymphadenopathy.  CHEST WALL AND LOWER NECK: Within normal limits.    ABDOMEN AND PELVIS:  LIVER: Within normal limits.  BILE DUCTS: Normal caliber.  GALLBLADDER: Within normal limits.  SPLEEN: Within normal limits.  PANCREAS: Within normal limits.  ADRENALS: Within normal limits.  KIDNEYS/URETERS: Within normal limits.    BLADDER: Within normal limits.  REPRODUCTIVE ORGANS: Uterus is unremarkable. Small right ovarian cyst, unchanged.    BOWEL: Enteric tube terminates in the stomach. No bowel obstruction. Status post right hemicolectomy. Colonic diverticulosis without evidence of diverticulitis.  PERITONEUM: No ascites.  VESSELS: Atherosclerotic changes.  RETROPERITONEUM/LYMPH NODES: No lymphadenopathy.  ABDOMINAL WALL: Ventral abdominal wall hernias containing nonobstructed small bowel, unchanged. Ventral abdominal wall hernia containing portion of the left hepatic lobe, unchanged.  BONES: Degenerative changes of the spine.    IMPRESSION:  New large consolidation with areas of cavitation in the right lung. Multifocal pneumonia with cavitation is a consideration.    No acute intra-abdominal pathology.    < end of copied text >

## 2020-12-26 NOTE — CHART NOTE - NSCHARTNOTEFT_GEN_A_CORE
Pre-Bronchoscopy Tuberculosis Risk Screening Tool  To reduce the risk for airborne transmission of Mycobacterium tuberculosis, this assessment form must be completed prior to bronchoscopy procedures being performed outside of a negative pressure environment.    Procedure Date: ______12/26/20________________  Health Care Provider Name: ______Dr. Johnson__________________  Form Completed By: ________Dr. Johnson________________  Reason for the Bronchoscopy: ________Hypoxemia and loss of tidal volumes__________________  Date of Procedure: __12/26/20________________  Planned Location for the Procedure:  [ ] Emergency Department     [ x] Intensive Care Unit     [ ] Operating Room     Other: ___________________    Risk Assessment  I. I have personally evaluated this patient for Mycobacterium tuberculosis and I determined the following risk:  [ x] No Risk for TB     [ ] Low risk or TB     [ ] Significant risk for TB    II. Additional Findings: _________________________    III. Based on the Determined Risk for TB the following Action(s) are Suggested:  1. If there are no risk factors for TB infection proceed with the procedure.  2. If there is low risk or significant risk for TB infection the following recommendations should be followed:            a. Perform the procedure in a negative pressure room, with N95 respirator.            b. If not feasible to move the patient or defer the procedure:                    i. Use a single-bedded room low traffic area to perform the bronchoscopy procedure.                   ii. Place a portable high-efficiency particulate air (HEPA) filter in the space prior to starting the procedure and keep closed.                       Refer to the INF.1132 titled“Tuberculosis Control Strategy Plan” for additional information.                  iii. All Health Care Providers within the procedure room shall wear an N95 respirator.            c. Documentation of the tuberculosis risk assessment should be included within the patient’s medical record.    Indication: Hypoxemia, loss of tidal volumes on ventilator    Operators: Bobbi Johnson MD     Preop medications: Fentanyl 50mcg and Versed 4 mg    Findings:  Bronchoscope inserted through tracheostomy (# 7 proximal XLT). Trach noted to be in good position. Airway evaluation revealed dynamic posterior airway collapse of main yuniel and right mainstem bronchus, intermittently collapsing over distal portion of trach. Airways inspected to subsegments bilaterally. No endobronchial lesions or large mucous plugs noted. Thick deep secretions suctioned from RLL. Bronchoscope then withdrawn from ETT. Pt tolerated procedure well. Pre-Bronchoscopy Tuberculosis Risk Screening Tool  To reduce the risk for airborne transmission of Mycobacterium tuberculosis, this assessment form must be completed prior to bronchoscopy procedures being performed outside of a negative pressure environment.    Procedure Date: ______12/26/20________________  Health Care Provider Name: ______Dr. Johnson__________________  Form Completed By: ________Dr. Johnson________________  Reason for the Bronchoscopy: ________Hypoxemia and loss of tidal volumes__________________  Date of Procedure: __12/26/20________________  Planned Location for the Procedure:  [ ] Emergency Department     [ x] Intensive Care Unit     [ ] Operating Room     Other: ___________________    Risk Assessment  I. I have personally evaluated this patient for Mycobacterium tuberculosis and I determined the following risk:  [ x] No Risk for TB     [ ] Low risk or TB     [ ] Significant risk for TB    II. Additional Findings: _________________________    III. Based on the Determined Risk for TB the following Action(s) are Suggested:  1. If there are no risk factors for TB infection proceed with the procedure.  2. If there is low risk or significant risk for TB infection the following recommendations should be followed:            a. Perform the procedure in a negative pressure room, with N95 respirator.            b. If not feasible to move the patient or defer the procedure:                    i. Use a single-bedded room low traffic area to perform the bronchoscopy procedure.                   ii. Place a portable high-efficiency particulate air (HEPA) filter in the space prior to starting the procedure and keep closed.                       Refer to the INF.1132 titled“Tuberculosis Control Strategy Plan” for additional information.                  iii. All Health Care Providers within the procedure room shall wear an N95 respirator.            c. Documentation of the tuberculosis risk assessment should be included within the patient’s medical record.    Indication: Hypoxemia, loss of tidal volumes on ventilator    Operators: Bobbi Johnson MD and KARLA Martinez    Preop medications: Fentanyl 50mcg and Versed 4 mg    Findings:  Bronchoscope inserted through tracheostomy (# 7 proximal XLT). Trach noted to be in good position. Airway evaluation revealed dynamic posterior airway collapse of main yuniel and right mainstem bronchus, intermittently collapsing over distal portion of trach. Airways inspected to subsegments bilaterally. No endobronchial lesions or large mucous plugs noted. Thick deep secretions suctioned from RLL. Bronchoscope then withdrawn from ETT. Pt tolerated procedure well.

## 2020-12-26 NOTE — PROGRESS NOTE ADULT - ATTENDING COMMENTS
Patient seen and examined. Laboratory data and imaging reviewed. Patient is critically ill requiring frequent bedside visits with therapy change. Patient 60F obesity who presented to [facility] on 11/18 with COVID pneumonia. She had progressive hypoxemic respiratory failure and was ultimately intubated on 11/23. She was transferred to [facility] on 12/10 for further monitoring and care.    For her COVID-19 pneumonia she has received Remdesivir and Dexamethasone. She is now s/p tracheostomy by CTS on 12/18. She does have a moderate cuff leak and will likely need to be upsized to an 8XLT when due for a trach change this week. She is currently saturating well and not in distress. Continue lung protective ventilation and wean settings as able. Her course has been complicated by multiple infections including MSSA and proteus bacteremia as well as stenotrophomonas pneumonia. She has been on multiple antibiotics and is currently completing a course of treatment. ID followup. A prior CT chest demonstrated a lung abscess. Previously she had worsened renal failure and transiently required HD. Currently she has normal renal function and is making urine. Her shiley was removed 12/21. She continues to receive feeds via OGT. A endoscopic PEG was not able to be placed due to her ventral hernia. A general surgery consult is pending but should she improve clinically her long term planning will be complicated by her need for PEG feeds. 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 She has been on sedation for a long time and is now being weaned off Methadone and Valium. A CTH from 12/12 did now show any acute findings. She is no longer in shock and has been restarted on home BP meds.    Remainder of plan as above. Overall prognosis poor. Eventual transfer to RCU once cleared by Infection Control and Administration. We must remember that once in RCU her family will see her for the first time after her extended hospital stay.

## 2020-12-27 LAB
ALBUMIN SERPL ELPH-MCNC: 2.2 G/DL — LOW (ref 3.3–5)
ALP SERPL-CCNC: 82 U/L — SIGNIFICANT CHANGE UP (ref 40–120)
ALT FLD-CCNC: <5 U/L — SIGNIFICANT CHANGE UP (ref 4–33)
ANION GAP SERPL CALC-SCNC: 6 MMOL/L — LOW (ref 7–14)
APPEARANCE UR: ABNORMAL
APTT BLD: 32.2 SEC — SIGNIFICANT CHANGE UP (ref 27–36.3)
AST SERPL-CCNC: 21 U/L — SIGNIFICANT CHANGE UP (ref 4–32)
BACTERIA # UR AUTO: ABNORMAL
BASOPHILS # BLD AUTO: 0.12 K/UL — SIGNIFICANT CHANGE UP (ref 0–0.2)
BASOPHILS NFR BLD AUTO: 0.9 % — SIGNIFICANT CHANGE UP (ref 0–2)
BILIRUB SERPL-MCNC: 0.5 MG/DL — SIGNIFICANT CHANGE UP (ref 0.2–1.2)
BILIRUB UR-MCNC: ABNORMAL
BLOOD GAS ARTERIAL - LYTES,HGB,ICA,LACT RESULT: SIGNIFICANT CHANGE UP
BUN SERPL-MCNC: 26 MG/DL — HIGH (ref 7–23)
CALCIUM SERPL-MCNC: 8.2 MG/DL — LOW (ref 8.4–10.5)
CHLORIDE SERPL-SCNC: 110 MMOL/L — HIGH (ref 98–107)
CO2 SERPL-SCNC: 37 MMOL/L — HIGH (ref 22–31)
COLOR SPEC: YELLOW — SIGNIFICANT CHANGE UP
CREAT SERPL-MCNC: 0.72 MG/DL — SIGNIFICANT CHANGE UP (ref 0.5–1.3)
CULTURE RESULTS: SIGNIFICANT CHANGE UP
CULTURE RESULTS: SIGNIFICANT CHANGE UP
DIFF PNL FLD: NEGATIVE — SIGNIFICANT CHANGE UP
EOSINOPHIL # BLD AUTO: 0.26 K/UL — SIGNIFICANT CHANGE UP (ref 0–0.5)
EOSINOPHIL NFR BLD AUTO: 2 % — SIGNIFICANT CHANGE UP (ref 0–6)
EPI CELLS # UR: 5 /HPF — SIGNIFICANT CHANGE UP (ref 0–5)
GLUCOSE BLDC GLUCOMTR-MCNC: 105 MG/DL — HIGH (ref 70–99)
GLUCOSE BLDC GLUCOMTR-MCNC: 107 MG/DL — HIGH (ref 70–99)
GLUCOSE BLDC GLUCOMTR-MCNC: 108 MG/DL — HIGH (ref 70–99)
GLUCOSE BLDC GLUCOMTR-MCNC: 110 MG/DL — HIGH (ref 70–99)
GLUCOSE SERPL-MCNC: 98 MG/DL — SIGNIFICANT CHANGE UP (ref 70–99)
GLUCOSE UR QL: NEGATIVE — SIGNIFICANT CHANGE UP
GRAM STN FLD: SIGNIFICANT CHANGE UP
HCT VFR BLD CALC: 27.1 % — LOW (ref 34.5–45)
HGB BLD-MCNC: 7.7 G/DL — LOW (ref 11.5–15.5)
IANC: 8.75 K/UL — HIGH (ref 1.5–8.5)
IMM GRANULOCYTES NFR BLD AUTO: 4.8 % — HIGH (ref 0–1.5)
INR BLD: 1.24 RATIO — HIGH (ref 0.88–1.16)
KETONES UR-MCNC: ABNORMAL
LEUKOCYTE ESTERASE UR-ACNC: ABNORMAL
LYMPHOCYTES # BLD AUTO: 16.3 % — SIGNIFICANT CHANGE UP (ref 13–44)
LYMPHOCYTES # BLD AUTO: 2.12 K/UL — SIGNIFICANT CHANGE UP (ref 1–3.3)
MAGNESIUM SERPL-MCNC: 1.3 MG/DL — LOW (ref 1.6–2.6)
MCHC RBC-ENTMCNC: 28.4 GM/DL — LOW (ref 32–36)
MCHC RBC-ENTMCNC: 28.8 PG — SIGNIFICANT CHANGE UP (ref 27–34)
MCV RBC AUTO: 101.5 FL — HIGH (ref 80–100)
MONOCYTES # BLD AUTO: 1.11 K/UL — HIGH (ref 0–0.9)
MONOCYTES NFR BLD AUTO: 8.6 % — SIGNIFICANT CHANGE UP (ref 2–14)
NEUTROPHILS # BLD AUTO: 8.75 K/UL — HIGH (ref 1.8–7.4)
NEUTROPHILS NFR BLD AUTO: 67.4 % — SIGNIFICANT CHANGE UP (ref 43–77)
NITRITE UR-MCNC: NEGATIVE — SIGNIFICANT CHANGE UP
NRBC # BLD: 0 /100 WBCS — SIGNIFICANT CHANGE UP
NRBC # FLD: 0.09 K/UL — HIGH
PH UR: 6.5 — SIGNIFICANT CHANGE UP (ref 5–8)
PHOSPHATE SERPL-MCNC: 2.7 MG/DL — SIGNIFICANT CHANGE UP (ref 2.5–4.5)
PLATELET # BLD AUTO: 275 K/UL — SIGNIFICANT CHANGE UP (ref 150–400)
POTASSIUM SERPL-MCNC: 3.7 MMOL/L — SIGNIFICANT CHANGE UP (ref 3.5–5.3)
POTASSIUM SERPL-SCNC: 3.7 MMOL/L — SIGNIFICANT CHANGE UP (ref 3.5–5.3)
PROT SERPL-MCNC: 6 G/DL — SIGNIFICANT CHANGE UP (ref 6–8.3)
PROT UR-MCNC: ABNORMAL
PROTHROM AB SERPL-ACNC: 14.1 SEC — HIGH (ref 10.6–13.6)
RBC # BLD: 2.67 M/UL — LOW (ref 3.8–5.2)
RBC # FLD: 21 % — HIGH (ref 10.3–14.5)
RBC CASTS # UR COMP ASSIST: 7 /HPF — HIGH (ref 0–4)
SODIUM SERPL-SCNC: 153 MMOL/L — HIGH (ref 135–145)
SP GR SPEC: 1.01 — SIGNIFICANT CHANGE UP (ref 1.01–1.02)
SPECIMEN SOURCE: SIGNIFICANT CHANGE UP
UROBILINOGEN FLD QL: ABNORMAL
WBC # BLD: 12.98 K/UL — HIGH (ref 3.8–10.5)
WBC # FLD AUTO: 12.98 K/UL — HIGH (ref 3.8–10.5)
WBC UR QL: 58 /HPF — HIGH (ref 0–5)

## 2020-12-27 PROCEDURE — 71045 X-RAY EXAM CHEST 1 VIEW: CPT | Mod: 26

## 2020-12-27 PROCEDURE — 43753 TX GASTRO INTUB W/ASP: CPT | Mod: GC,59

## 2020-12-27 PROCEDURE — 99291 CRITICAL CARE FIRST HOUR: CPT | Mod: 25

## 2020-12-27 RX ORDER — MAGNESIUM SULFATE 500 MG/ML
2 VIAL (ML) INJECTION ONCE
Refills: 0 | Status: COMPLETED | OUTPATIENT
Start: 2020-12-27 | End: 2020-12-27

## 2020-12-27 RX ORDER — DIAZEPAM 5 MG
5 TABLET ORAL THREE TIMES A DAY
Refills: 0 | Status: DISCONTINUED | OUTPATIENT
Start: 2020-12-27 | End: 2020-12-31

## 2020-12-27 RX ORDER — ACETAMINOPHEN 500 MG
1000 TABLET ORAL ONCE
Refills: 0 | Status: COMPLETED | OUTPATIENT
Start: 2020-12-27 | End: 2020-12-27

## 2020-12-27 RX ORDER — ACETAMINOPHEN 500 MG
650 TABLET ORAL EVERY 6 HOURS
Refills: 0 | Status: DISCONTINUED | OUTPATIENT
Start: 2020-12-27 | End: 2021-01-01

## 2020-12-27 RX ORDER — METHADONE HYDROCHLORIDE 40 MG/1
5 TABLET ORAL EVERY 8 HOURS
Refills: 0 | Status: DISCONTINUED | OUTPATIENT
Start: 2020-12-27 | End: 2020-12-31

## 2020-12-27 RX ADMIN — Medication 50 GRAM(S): at 12:12

## 2020-12-27 RX ADMIN — AMLODIPINE BESYLATE 5 MILLIGRAM(S): 2.5 TABLET ORAL at 06:32

## 2020-12-27 RX ADMIN — Medication 650 MILLIGRAM(S): at 11:40

## 2020-12-27 RX ADMIN — Medication 650 MILLIGRAM(S): at 12:00

## 2020-12-27 RX ADMIN — HUMAN INSULIN 5 UNIT(S): 100 INJECTION, SUSPENSION SUBCUTANEOUS at 23:55

## 2020-12-27 RX ADMIN — Medication 400 MILLIGRAM(S): at 04:20

## 2020-12-27 RX ADMIN — ENOXAPARIN SODIUM 100 MILLIGRAM(S): 100 INJECTION SUBCUTANEOUS at 17:44

## 2020-12-27 RX ADMIN — Medication 5 MILLIGRAM(S): at 14:45

## 2020-12-27 RX ADMIN — Medication 50 MILLIGRAM(S): at 06:40

## 2020-12-27 RX ADMIN — SENNA PLUS 15 MILLILITER(S): 8.6 TABLET ORAL at 17:43

## 2020-12-27 RX ADMIN — IMIPENEM AND CILASTATIN 100 MILLIGRAM(S): 250; 250 INJECTION, POWDER, FOR SOLUTION INTRAVENOUS at 14:45

## 2020-12-27 RX ADMIN — SENNA PLUS 15 MILLILITER(S): 8.6 TABLET ORAL at 06:32

## 2020-12-27 RX ADMIN — METHADONE HYDROCHLORIDE 10 MILLIGRAM(S): 40 TABLET ORAL at 06:32

## 2020-12-27 RX ADMIN — CHLORHEXIDINE GLUCONATE 15 MILLILITER(S): 213 SOLUTION TOPICAL at 06:33

## 2020-12-27 RX ADMIN — HUMAN INSULIN 5 UNIT(S): 100 INJECTION, SUSPENSION SUBCUTANEOUS at 12:12

## 2020-12-27 RX ADMIN — Medication 10 MILLIGRAM(S): at 06:32

## 2020-12-27 RX ADMIN — METHADONE HYDROCHLORIDE 5 MILLIGRAM(S): 40 TABLET ORAL at 21:27

## 2020-12-27 RX ADMIN — IMIPENEM AND CILASTATIN 100 MILLIGRAM(S): 250; 250 INJECTION, POWDER, FOR SOLUTION INTRAVENOUS at 03:00

## 2020-12-27 RX ADMIN — CHLORHEXIDINE GLUCONATE 1 APPLICATION(S): 213 SOLUTION TOPICAL at 06:20

## 2020-12-27 RX ADMIN — METHADONE HYDROCHLORIDE 5 MILLIGRAM(S): 40 TABLET ORAL at 14:45

## 2020-12-27 RX ADMIN — CHLORHEXIDINE GLUCONATE 15 MILLILITER(S): 213 SOLUTION TOPICAL at 17:43

## 2020-12-27 RX ADMIN — HUMAN INSULIN 5 UNIT(S): 100 INJECTION, SUSPENSION SUBCUTANEOUS at 06:21

## 2020-12-27 RX ADMIN — ENOXAPARIN SODIUM 100 MILLIGRAM(S): 100 INJECTION SUBCUTANEOUS at 06:40

## 2020-12-27 RX ADMIN — Medication 50 MILLIGRAM(S): at 17:44

## 2020-12-27 RX ADMIN — Medication 1000 MILLIGRAM(S): at 06:40

## 2020-12-27 RX ADMIN — Medication 5 MILLIGRAM(S): at 21:27

## 2020-12-27 RX ADMIN — PANTOPRAZOLE SODIUM 40 MILLIGRAM(S): 20 TABLET, DELAYED RELEASE ORAL at 11:40

## 2020-12-27 RX ADMIN — HUMAN INSULIN 5 UNIT(S): 100 INJECTION, SUSPENSION SUBCUTANEOUS at 17:46

## 2020-12-27 RX ADMIN — POLYETHYLENE GLYCOL 3350 17 GRAM(S): 17 POWDER, FOR SOLUTION ORAL at 11:40

## 2020-12-27 NOTE — PROGRESS NOTE ADULT - ASSESSMENT
This is a 60 year old Female with a PMH of HTN, DVT on Xarelto, peritonitis s/p Oral's procedure and ileostomy (reversed 2011), AARON who presented to [facility] on 11/18  w/ fevers found to have COVID-19, intubated 11/23, course complicated by superimposed PNA and bacteremia with Stenotrophomonas (12/11, completed Levaquin x7 days), MSSA/P. mirablis bacteremia (on Cefazolin, potentially due to urine vs line-associated and MSSA in sputum due to PNA), also with lung abscesses on CT chest on 12/5 , ARTEMIO/ATN requiring CRRT and HD. Now unable to wean from ventilator and transferred from [facility] on 12/10 to offload COVID unit. Unable to wean from ventilator, s/p tracheostmy with ctsx 12/18 planned for PEG 12/24 but unable to perform due to large ventral hernia     #Neuro  Alert & oriented x3 @baseline.   -off all IV sedation, c/w altered mental status  - wean standing methadone and valium to 5mg TID  - CTH negative   - last ammonia level was 66   -plan for spot EEG and possible MRI     #Resp  Acute hypoxemic respiratory failure 2/2 covid-19 PNA, intubated 11/23. Course complicated by MSSA pneumonia, stenotrophomonas, p. mirabalis, and lung abscess on CT chest from 12/5. Unable to wean from ventilator now trached by ctx on 12/18. Now with significant positional air leak from tracheotomy    - c/w AC 32/370/10/50%  still unable to tolerate PS trial   - Trached by CT surg 12/18. CT surg to remove trach sutures on 1/1/21  - Pt noted to have large leak. Discussed plan for possible trach upsize with CT surge. CT surge to see patient.       #CV  Hx of DVT on Xarelto was on heparin gtt but was stopped due to low H/H. Intermittently requiring norepinephrine most likely from vasoplegic shock from sedation medications, now hypertensive  - c/w home metoprolol and amlodipine and monitor BP    - CT negative for PE    #GI  - Continue TF, tolerating well  - Continue bowel regimen  - c/w PPI   - Per Dr. Cruz from CT surge, Due to Ventral Hernia, pt is not a candidate for Percutaneous gastrostomy. Will need to consult general surgery for possible open gastrostomy tube     #  ARTEMIO 2/2 ATN requiring CRRT, then Bumex challenge and now intermittent HD, finished trial with Bumex yesterday with okay response. Started on Bumex gtt now making good UO. Bumex gtt d/c'ed.   -last HD 12/17, Bumex gtt d/c'ed. Now with renal recovery and making adequate UO not requiring HD   - will trend electrolytes and replete as necessary  - Rivera in place  -strict I’s and O’s  - urine output decreased today, will give Lasix 40mg IVP x1    #ID  Covid 19 pneumonia, MSSA bacteremia/pneumonia, cavitary consolidation on CT chest.  - s/p remdesivir and dexamethasone   - + Sputum/blood MSSA, + P mirablis in blood, +Stenotrophomonas in sputum   - Was previously on Cefazolin for MSSA bacteremia. Pt continues with fevers, ABX broaden to Fadia and Vanco on 12/11  - s/p Levofloxacin   - caspofungin discontinued, no fungal growth.   - Blood cx from 12/7 NGTD. 12/12 sputum with gram negative rods now speciated to Stenotrophomonas   -will D/C meropenem (12/11-12/16)   - ID following, will touch base given breakthrough fevers  - f/u repeat cultures and UA  -c/w imipenem (12/16- 12/28) 500mg q12h x 2 weeks for cavitation and MSSA,     #Heme  Hx DVT on Xarelto Anemic 12/13 with no evidence of bleeding s/p 1u PRBC. Drop in Hgb on 12/23 with no signs of bleeding requiring 1u PRBC and heparin gtt was D/C'd   -Anticoagulation with Lovenox 100BID    -continue to trend CBC/ PTT  -will assess need for blood if Hgb continues to drop     #Endo  - ISS q6hrs  - NPH q6hrs      #Dispo: Pt stable for transfer to RCU. COVID 19 swab negative on 12/9. Will follow up with infection control regarding eligibility

## 2020-12-27 NOTE — PROGRESS NOTE ADULT - ATTENDING COMMENTS
Patient seen and examined. Laboratory data and imaging reviewed. Patient is critically ill requiring frequent bedside visits with therapy change. Patient 60F obesity who presented to [facility] on 11/18 with COVID pneumonia. She had progressive hypoxemic respiratory failure and was ultimately intubated on 11/23. She was transferred to [facility] on 12/10 for further monitoring and care.    For her COVID-19 pneumonia she has received Remdesivir and Dexamethasone. She is now s/p tracheostomy by CTS on 12/18. She does have a moderate cuff leak and will likely need to be upsized to an 8XLT when due for a trach change this week. She is currently saturating well and not in distress. Continue lung protective ventilation and wean settings as able. Her course has been complicated by multiple infections including MSSA and proteus bacteremia as well as stenotrophomonas pneumonia. She has been on multiple antibiotics and is currently completing a course of treatment. ID followup. A prior CT chest demonstrated a lung abscess. She is febrile again and cultures were sent. Previously she had worsened renal failure and transiently required HD. Currently she has normal renal function and is making urine. Her shiley was removed 12/21. She continues to receive feeds via NGT. A endoscopic PEG was not able to be placed due to her ventral hernia. General surgery consulted but feels risk of PEG placement given hernia/abdomen is too risky at this time. Will change NGT to smaller Bernard feed tube. 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 She has been on sedation for a long time and is now being weaned off Methadone and Valium. A CTH from 12/12 did now show any acute findings. Will check spot EEG. She is no longer in shock and has been restarted on home BP meds.    Remainder of plan as above. Overall prognosis poor. Eventual transfer to RCU once cleared by Infection Control and Administration.

## 2020-12-28 LAB
ALBUMIN SERPL ELPH-MCNC: 2.2 G/DL — LOW (ref 3.3–5)
ALP SERPL-CCNC: 83 U/L — SIGNIFICANT CHANGE UP (ref 40–120)
ALT FLD-CCNC: 5 U/L — SIGNIFICANT CHANGE UP (ref 4–33)
ANION GAP SERPL CALC-SCNC: 8 MMOL/L — SIGNIFICANT CHANGE UP (ref 7–14)
ANION GAP SERPL CALC-SCNC: 8 MMOL/L — SIGNIFICANT CHANGE UP (ref 7–14)
APTT BLD: 34.9 SEC — SIGNIFICANT CHANGE UP (ref 27–36.3)
AST SERPL-CCNC: 19 U/L — SIGNIFICANT CHANGE UP (ref 4–32)
BASOPHILS # BLD AUTO: 0.15 K/UL — SIGNIFICANT CHANGE UP (ref 0–0.2)
BASOPHILS NFR BLD AUTO: 0.9 % — SIGNIFICANT CHANGE UP (ref 0–2)
BILIRUB SERPL-MCNC: 0.5 MG/DL — SIGNIFICANT CHANGE UP (ref 0.2–1.2)
BLOOD GAS ARTERIAL - LYTES,HGB,ICA,LACT RESULT: SIGNIFICANT CHANGE UP
BUN SERPL-MCNC: 25 MG/DL — HIGH (ref 7–23)
BUN SERPL-MCNC: 25 MG/DL — HIGH (ref 7–23)
CALCIUM SERPL-MCNC: 7.9 MG/DL — LOW (ref 8.4–10.5)
CALCIUM SERPL-MCNC: 8.1 MG/DL — LOW (ref 8.4–10.5)
CHLORIDE SERPL-SCNC: 106 MMOL/L — SIGNIFICANT CHANGE UP (ref 98–107)
CHLORIDE SERPL-SCNC: 112 MMOL/L — HIGH (ref 98–107)
CO2 SERPL-SCNC: 34 MMOL/L — HIGH (ref 22–31)
CO2 SERPL-SCNC: 35 MMOL/L — HIGH (ref 22–31)
CREAT SERPL-MCNC: 0.68 MG/DL — SIGNIFICANT CHANGE UP (ref 0.5–1.3)
CREAT SERPL-MCNC: 0.78 MG/DL — SIGNIFICANT CHANGE UP (ref 0.5–1.3)
EOSINOPHIL # BLD AUTO: 0.23 K/UL — SIGNIFICANT CHANGE UP (ref 0–0.5)
EOSINOPHIL NFR BLD AUTO: 1.4 % — SIGNIFICANT CHANGE UP (ref 0–6)
GLUCOSE BLDC GLUCOMTR-MCNC: 106 MG/DL — HIGH (ref 70–99)
GLUCOSE BLDC GLUCOMTR-MCNC: 108 MG/DL — HIGH (ref 70–99)
GLUCOSE BLDC GLUCOMTR-MCNC: 126 MG/DL — HIGH (ref 70–99)
GLUCOSE SERPL-MCNC: 114 MG/DL — HIGH (ref 70–99)
GLUCOSE SERPL-MCNC: 118 MG/DL — HIGH (ref 70–99)
HCT VFR BLD CALC: 29 % — LOW (ref 34.5–45)
HGB BLD-MCNC: 8.1 G/DL — LOW (ref 11.5–15.5)
IANC: 11.95 K/UL — HIGH (ref 1.5–8.5)
IMM GRANULOCYTES NFR BLD AUTO: 3.6 % — HIGH (ref 0–1.5)
INR BLD: 1.2 RATIO — HIGH (ref 0.88–1.16)
LYMPHOCYTES # BLD AUTO: 16 % — SIGNIFICANT CHANGE UP (ref 13–44)
LYMPHOCYTES # BLD AUTO: 2.68 K/UL — SIGNIFICANT CHANGE UP (ref 1–3.3)
MAGNESIUM SERPL-MCNC: 1.7 MG/DL — SIGNIFICANT CHANGE UP (ref 1.6–2.6)
MAGNESIUM SERPL-MCNC: 1.7 MG/DL — SIGNIFICANT CHANGE UP (ref 1.6–2.6)
MCHC RBC-ENTMCNC: 27.9 GM/DL — LOW (ref 32–36)
MCHC RBC-ENTMCNC: 28.4 PG — SIGNIFICANT CHANGE UP (ref 27–34)
MCV RBC AUTO: 101.8 FL — HIGH (ref 80–100)
MONOCYTES # BLD AUTO: 1.12 K/UL — HIGH (ref 0–0.9)
MONOCYTES NFR BLD AUTO: 6.7 % — SIGNIFICANT CHANGE UP (ref 2–14)
NEUTROPHILS # BLD AUTO: 11.95 K/UL — HIGH (ref 1.8–7.4)
NEUTROPHILS NFR BLD AUTO: 71.4 % — SIGNIFICANT CHANGE UP (ref 43–77)
NRBC # BLD: 0 /100 WBCS — SIGNIFICANT CHANGE UP
NRBC # FLD: 0.02 K/UL — HIGH
PHOSPHATE SERPL-MCNC: 2.3 MG/DL — LOW (ref 2.5–4.5)
PHOSPHATE SERPL-MCNC: 3.5 MG/DL — SIGNIFICANT CHANGE UP (ref 2.5–4.5)
PLATELET # BLD AUTO: 280 K/UL — SIGNIFICANT CHANGE UP (ref 150–400)
POTASSIUM SERPL-MCNC: 2.7 MMOL/L — CRITICAL LOW (ref 3.5–5.3)
POTASSIUM SERPL-MCNC: 4.1 MMOL/L — SIGNIFICANT CHANGE UP (ref 3.5–5.3)
POTASSIUM SERPL-SCNC: 2.7 MMOL/L — CRITICAL LOW (ref 3.5–5.3)
POTASSIUM SERPL-SCNC: 4.1 MMOL/L — SIGNIFICANT CHANGE UP (ref 3.5–5.3)
PROT SERPL-MCNC: 6.5 G/DL — SIGNIFICANT CHANGE UP (ref 6–8.3)
PROTHROM AB SERPL-ACNC: 13.7 SEC — HIGH (ref 10.6–13.6)
RBC # BLD: 2.85 M/UL — LOW (ref 3.8–5.2)
RBC # FLD: 20.7 % — HIGH (ref 10.3–14.5)
SODIUM SERPL-SCNC: 148 MMOL/L — HIGH (ref 135–145)
SODIUM SERPL-SCNC: 155 MMOL/L — HIGH (ref 135–145)
WBC # BLD: 16.73 K/UL — HIGH (ref 3.8–10.5)
WBC # FLD AUTO: 16.73 K/UL — HIGH (ref 3.8–10.5)

## 2020-12-28 PROCEDURE — 99291 CRITICAL CARE FIRST HOUR: CPT

## 2020-12-28 RX ORDER — POTASSIUM CHLORIDE 20 MEQ
10 PACKET (EA) ORAL
Refills: 0 | Status: COMPLETED | OUTPATIENT
Start: 2020-12-28 | End: 2020-12-28

## 2020-12-28 RX ORDER — HUMAN INSULIN 100 [IU]/ML
3 INJECTION, SUSPENSION SUBCUTANEOUS EVERY 6 HOURS
Refills: 0 | Status: DISCONTINUED | OUTPATIENT
Start: 2020-12-28 | End: 2020-12-30

## 2020-12-28 RX ORDER — POTASSIUM CHLORIDE 20 MEQ
40 PACKET (EA) ORAL ONCE
Refills: 0 | Status: COMPLETED | OUTPATIENT
Start: 2020-12-28 | End: 2020-12-28

## 2020-12-28 RX ORDER — CHLORHEXIDINE GLUCONATE 213 G/1000ML
15 SOLUTION TOPICAL EVERY 12 HOURS
Refills: 0 | Status: DISCONTINUED | OUTPATIENT
Start: 2020-12-28 | End: 2020-12-30

## 2020-12-28 RX ADMIN — Medication 5 MILLIGRAM(S): at 21:15

## 2020-12-28 RX ADMIN — PANTOPRAZOLE SODIUM 40 MILLIGRAM(S): 20 TABLET, DELAYED RELEASE ORAL at 12:05

## 2020-12-28 RX ADMIN — Medication 40 MILLIEQUIVALENT(S): at 12:05

## 2020-12-28 RX ADMIN — Medication 5 MILLIGRAM(S): at 05:32

## 2020-12-28 RX ADMIN — Medication 650 MILLIGRAM(S): at 13:00

## 2020-12-28 RX ADMIN — HUMAN INSULIN 3 UNIT(S): 100 INJECTION, SUSPENSION SUBCUTANEOUS at 18:37

## 2020-12-28 RX ADMIN — Medication 100 MILLIEQUIVALENT(S): at 08:42

## 2020-12-28 RX ADMIN — Medication 50 MILLIGRAM(S): at 18:28

## 2020-12-28 RX ADMIN — Medication 650 MILLIGRAM(S): at 12:05

## 2020-12-28 RX ADMIN — ENOXAPARIN SODIUM 100 MILLIGRAM(S): 100 INJECTION SUBCUTANEOUS at 05:32

## 2020-12-28 RX ADMIN — METHADONE HYDROCHLORIDE 5 MILLIGRAM(S): 40 TABLET ORAL at 05:32

## 2020-12-28 RX ADMIN — POLYETHYLENE GLYCOL 3350 17 GRAM(S): 17 POWDER, FOR SOLUTION ORAL at 12:05

## 2020-12-28 RX ADMIN — Medication 100 MILLIEQUIVALENT(S): at 09:00

## 2020-12-28 RX ADMIN — Medication 5 MILLIGRAM(S): at 13:38

## 2020-12-28 RX ADMIN — METHADONE HYDROCHLORIDE 5 MILLIGRAM(S): 40 TABLET ORAL at 13:38

## 2020-12-28 RX ADMIN — METHADONE HYDROCHLORIDE 5 MILLIGRAM(S): 40 TABLET ORAL at 21:15

## 2020-12-28 RX ADMIN — Medication 50 MILLIGRAM(S): at 05:32

## 2020-12-28 RX ADMIN — CHLORHEXIDINE GLUCONATE 15 MILLILITER(S): 213 SOLUTION TOPICAL at 05:33

## 2020-12-28 RX ADMIN — CHLORHEXIDINE GLUCONATE 1 APPLICATION(S): 213 SOLUTION TOPICAL at 05:01

## 2020-12-28 RX ADMIN — ENOXAPARIN SODIUM 100 MILLIGRAM(S): 100 INJECTION SUBCUTANEOUS at 18:28

## 2020-12-28 RX ADMIN — HUMAN INSULIN 3 UNIT(S): 100 INJECTION, SUSPENSION SUBCUTANEOUS at 12:06

## 2020-12-28 RX ADMIN — CHLORHEXIDINE GLUCONATE 15 MILLILITER(S): 213 SOLUTION TOPICAL at 18:28

## 2020-12-28 RX ADMIN — SENNA PLUS 15 MILLILITER(S): 8.6 TABLET ORAL at 05:33

## 2020-12-28 RX ADMIN — AMLODIPINE BESYLATE 5 MILLIGRAM(S): 2.5 TABLET ORAL at 05:32

## 2020-12-28 RX ADMIN — HUMAN INSULIN 5 UNIT(S): 100 INJECTION, SUSPENSION SUBCUTANEOUS at 08:49

## 2020-12-28 RX ADMIN — IMIPENEM AND CILASTATIN 100 MILLIGRAM(S): 250; 250 INJECTION, POWDER, FOR SOLUTION INTRAVENOUS at 03:00

## 2020-12-28 NOTE — PROGRESS NOTE ADULT - ATTENDING COMMENTS
60F w/ HTN, DVT/PE, peritonitis s/p Oral's that was reversed, AARON. Admitted w/ 11/18, intubated on 11/23. Course complicated by MSSA bacteremia/pna, proteus bacteremia, stenotrophomonas in the sputum w/ cavitary lung lesions/abscesses, ARTEMIO requiring CRRT now recovered, failure to wean s/p trach on 12/18.     - has not recovered mental status, on low doses of valium and methadone; awaiting VEEG and may get MRI If VEEG if nothing revealing  - on minimal vent settings, PS weaning as tolerated  - CTS following trach - may need to be upsized  - on imipenam for MSSA and Stenotrophomonas - spiked fever 2 nights ago and slight increase in WBC count; continue current course for now, touch base w/ ID  - normal renal function; K, Mg and Phos repleted; continue w/ freew water for hyperNarepeat BMP this afternoon  - FS running low, will decrease NPH today  - will need PEG tube  - full dose lovenox for hx of DVT/PE   - pt stable for transfer to RCU

## 2020-12-28 NOTE — PROGRESS NOTE ADULT - SUBJECTIVE AND OBJECTIVE BOX
CHIEF COMPLAINT:    Interval Events: No fevers overnight. Still with large leak from highly positional trach.     REVIEW OF SYSTEMS:  Constitutional:   Eyes:  ENT:  CV:  Resp:  GI:  :  MSK:  Integumentary:  Neurological:  Psychiatric:  Endocrine:  Hematologic/Lymphatic:  Allergic/Immunologic:  [ ] All other systems negative  [X] Unable to assess ROS because unresponsive    OBJECTIVE:  ICU Vital Signs Last 24 Hrs  T(C): 36.7 (28 Dec 2020 04:00), Max: 39.4 (27 Dec 2020 11:00)  T(F): 98.1 (28 Dec 2020 04:00), Max: 103 (27 Dec 2020 11:00)  HR: 94 (28 Dec 2020 10:00) (64 - 102)  BP: --  BP(mean): --  ABP: 113/46 (28 Dec 2020 10:00) (106/44 - 160/63)  ABP(mean): 70 (28 Dec 2020 10:00) (67 - 103)  RR: 28 (28 Dec 2020 07:00) (28 - 33)  SpO2: 91% (28 Dec 2020 10:00) (91% - 100%)    Mode: AC/ CMV (Assist Control/ Continuous Mandatory Ventilation), RR (machine): 28, TV (machine): 370, FiO2: 40, PEEP: 10, ITime: 0.87, MAP: 21, PIP: 42     @ 07:01  -   @ 07:00  --------------------------------------------------------  IN: 1480 mL / OUT: 2715 mL / NET: -1235 mL      CAPILLARY BLOOD GLUCOSE      POCT Blood Glucose.: 126 mg/dL (28 Dec 2020 06:15)      PHYSICAL EXAM:  HEENT: + PERRLA, EOMI, no drainage or redness  Neck: supple,  No JVD  Respiratory: Trached and Ventilator assisted breath rhonchi bilaterally to auscultation, no accessory muscle use noted  Cardiovascular: Regular rate, regular rhythm, normal S1, S2; no murmurs or rub  Gastrointestinal: obese, soft, non-tender, non distended, no hepatosplenomegaly, normal bowel sounds  Extremities: + 2 peripheral edema, no cyanosis, no clubbing   Vascular: Equal and normal pulses: 2+ peripheral pulses throughout  Neurological: awake not responsive to commands  Skin: DTI to chin area, warm, dry, well perfused    HOSPITAL MEDICATIONS:  MEDICATIONS  (STANDING):  amLODIPine   Tablet 5 milliGRAM(s) Oral daily  chlorhexidine 0.12% Liquid 15 milliLiter(s) Oral Mucosa every 12 hours  chlorhexidine 4% Liquid 1 Application(s) Topical <User Schedule>  diazepam    Tablet 5 milliGRAM(s) Oral three times a day  enoxaparin Injectable 100 milliGRAM(s) SubCutaneous every 12 hours  imipenem/cilastatin  IVPB 500 milliGRAM(s) IV Intermittent every 12 hours  insulin lispro (ADMELOG) corrective regimen sliding scale   SubCutaneous every 6 hours  insulin NPH human recombinant 3 Unit(s) SubCutaneous every 6 hours  methadone   Solution 5 milliGRAM(s) Oral every 8 hours  metoprolol tartrate 50 milliGRAM(s) Oral two times a day  pantoprazole  Injectable 40 milliGRAM(s) IV Push daily  polyethylene glycol 3350 17 Gram(s) Oral daily  potassium chloride   Powder 40 milliEquivalent(s) Oral once  potassium chloride  10 mEq/100 mL IVPB 10 milliEquivalent(s) IV Intermittent every 1 hour  senna Syrup 15 milliLiter(s) Oral two times a day    MEDICATIONS  (PRN):  acetaminophen    Suspension .. 650 milliGRAM(s) Oral every 6 hours PRN Temp greater or equal to 38.5C (101.3F)      LABS:                        8.1    16.73 )-----------( 280      ( 28 Dec 2020 03:53 )             29.0     12-    148<H>  |  106  |  25<H>  ----------------------------<  114<H>  2.7<LL>   |  34<H>  |  0.68    Ca    8.1<L>      28 Dec 2020 03:53  Phos  2.3     12-  Mg     1.7         TPro  6.5  /  Alb  2.2<L>  /  TBili  0.5  /  DBili  x   /  AST  19  /  ALT  5   /  AlkPhos  83  12-    PT/INR - ( 28 Dec 2020 03:53 )   PT: 13.7 sec;   INR: 1.20 ratio         PTT - ( 28 Dec 2020 03:53 )  PTT:34.9 sec  Urinalysis Basic - ( 27 Dec 2020 10:20 )    Color: Yellow / Appearance: Slightly Turbid / S.015 / pH: x  Gluc: x / Ketone: Trace  / Bili: Small / Urobili: 12 mg/dL   Blood: x / Protein: 30 mg/dL / Nitrite: Negative   Leuk Esterase: Moderate / RBC: 7 /HPF / WBC 58 /HPF   Sq Epi: x / Non Sq Epi: 5 /HPF / Bacteria: Few      Arterial Blood Gas:   @ 03:53  7.50/46/121/35/98.8/11.3  ABG lactate: --  Arterial Blood Gas:   @ 02:56  7.44/57/77/36/95.3/12.9  ABG lactate: --        MICROBIOLOGY:     RADIOLOGY:  [ ] Reviewed and interpreted by me    EKG:

## 2020-12-28 NOTE — PROGRESS NOTE ADULT - ASSESSMENT
This is a 60 year old Female with a PMH of HTN, DVT on Xarelto, peritonitis s/p Oral's procedure and ileostomy (reversed 2011), AARON who presented to [facility] on 11/18  w/ fevers found to have COVID-19, intubated 11/23, course complicated by superimposed PNA and bacteremia with Stenotrophomonas (12/11, completed Levaquin x7 days), MSSA/P. mirablis bacteremia (on Cefazolin, potentially due to urine vs line-associated and MSSA in sputum due to PNA), also with lung abscesses on CT chest on 12/5 , ARTEMIO/ATN requiring CRRT and HD. Now unable to wean from ventilator and transferred from [facility] on 12/10 to offload COVID unit. Unable to wean from ventilator, s/p tracheostmy with ctsx 12/18 planned for PEG 12/24 but unable to perform due to large ventral hernia     #Neuro - Alert & oriented x3 @baseline. Opens eyes to voice but not following commands with no purposeful movements off all IV sedation.   - c/w methadone and valium 5mg TID, wean as tolerated  - plan for spot EEG and possible MRI     #Resp  Acute hypoxemic respiratory failure 2/2 covid-19 PNA, intubated 11/23. Course complicated by MSSA pneumonia, stenotrophomonas, p. mirabalis, and lung abscess on CT chest from 12/5. Unable to wean from ventilator now trached by ctx on 12/18. Now with significant positional air leak from tracheotomy    - c/w AC 28/370/10/40%  still unable to tolerate PS trial   - Trached by CT surg 12/18. CT surg to remove trach sutures on 1/1/21  - Pt noted to have large leak. Discussed plan for possible trach upsize with CT surge. CT surge to see patient.       #CV  Hx of DVT on Xarelto was on heparin gtt but was stopped due to low H/H. Intermittently requiring norepinephrine most likely from vasoplegic shock from sedation medications, now hypertensive  - c/w home metoprolol and amlodipine and monitor BP    - CT negative for PE    #GI  - Continue TF, tolerating well  - Continue bowel regimen  - c/w PPI   - Per Dr. Cruz from CT surge, Due to Ventral Hernia, pt is not a candidate for Percutaneous gastrostomy. Consulted general surgery for possible open gastrostomy tube, deferring for now.    #  ARTEMIO 2/2 ATN requiring CRRT, then Bumex challenge and now intermittent HD, finished trial with Bumex yesterday with okay response. Started on Bumex gtt now making good UO. Bumex gtt d/c'ed.   -last HD 12/17, Bumex gtt d/c'ed. Now with renal recovery and making adequate UO not requiring HD   - will trend electrolytes and replete as necessary  - Rivera in place  -strict I’s and O’s    #ID  Covid 19 pneumonia, MSSA bacteremia/pneumonia, cavitary consolidation on CT chest.  - s/p remdesivir and dexamethasone   - + Sputum/blood MSSA, + P mirablis in blood, +Stenotrophomonas in sputum   - Was previously on Cefazolin for MSSA bacteremia. Pt continues with fevers, ABX broaden to Fadia and Vanco on 12/11  - s/p Levofloxacin   - caspofungin discontinued, no fungal growth.   - Blood cx from 12/7 NGTD. 12/12 sputum with gram negative rods now speciated to Stenotrophomonas   -will D/C meropenem (12/11-12/16)   - ID following, will touch base given breakthrough fevers  - f/u repeat cultures and UA  -c/w imipenem (12/16- 12/28) 500mg q12h x 2 weeks for cavitation and MSSA,     #Heme  Hx DVT on Xarelto Anemic 12/13 with no evidence of bleeding s/p 1u PRBC. Drop in Hgb on 12/23 with no signs of bleeding requiring 1u PRBC and heparin gtt was D/C'd   -Anticoagulation with Lovenox 100BID    -continue to trend CBC/ PTT  -will assess need for blood if Hgb continues to drop     #Endo  - ISS q6hrs  - NPH q6hrs    #Dispo: Pt stable for transfer to RCU. COVID 19 swab negative on 12/9. Will follow up with infection control regarding eligibility

## 2020-12-28 NOTE — CHART NOTE - NSCHARTNOTEFT_GEN_A_CORE
Pt transferred from [facility] to [facility] today. Clinically stable and unchanged  Will c/w current management  Free water increased to 300 q 4hr for hypernatremia  Plan for Spot EEG and possible MRI  c/w Levaquin for now, and monitor fever curve. ID following

## 2020-12-28 NOTE — CHART NOTE - NSCHARTNOTEFT_GEN_A_CORE
Patient is a 60 year old Female with a PMH of HTN, DVT on Xarelto, peritonitis s/p Oral's procedure and ileostomy (reversed 2011), AARON who presented to [facility] on 11/18  w/ fevers found to have COVID-19, intubated 11/23, course complicated by superimposed PNA and bacteremia with Stenotrophomonas (12/11, completed Levaquin x7 days), MSSA/P. mirablis bacteremia (on Cefazolin, potentially due to urine vs line-associated and MSSA in sputum due to PNA), also with lung abscesses on CT chest on 12/5 , ARTEMIO/ATN requiring CRRT and HD. Now unable to wean from ventilator and transferred from [facility] on 12/10 to offload COVID unit. Unable to wean from ventilator, s/p tracheostmy with ctsx 12/18 planned for PEG 12/24 but unable to perform due to large ventral hernia     #Neuro - Alert & oriented x3 @baseline. Opens eyes to voice but not following commands with no purposeful movements off all IV sedation.   - c/w methadone and valium 5mg TID, wean as tolerated  - plan for spot EEG and possible MRI     #Resp  Acute hypoxemic respiratory failure 2/2 covid-19 PNA, intubated 11/23. Course complicated by MSSA pneumonia, stenotrophomonas, p. mirabalis, and lung abscess on CT chest from 12/5. Unable to wean from ventilator now trached by ctx on 12/18. Now with significant positional air leak from tracheotomy    - c/w AC 28/370/10/40%  still unable to tolerate PS trial   - Trached by CT surg 12/18. CT surg to remove trach sutures on 1/1/21  - Pt noted to have large leak. Discussed plan for possible trach upsize with CT surge. CT surge to see patient.       #CV  Hx of DVT on Xarelto  - c/w home metoprolol and amlodipine and monitor BP    - CT negative for PE  - c/w Lovenox 80 BID for prior VTE    #GI  - Continue TF, tolerating well  - Continue bowel regimen  - c/w PPI   - Per Dr. Cruz from CT surge, Due to Ventral Hernia, pt is not a candidate for Percutaneous gastrostomy. Consulted general surgery for possible open gastrostomy tube, deferring for now.    #  ARTEMIO 2/2 ATN requiring CRRT and HD now recovered  - will trend electrolytes and replete as necessary  - Rivera in place  -strict I’s and O’s    #ID  Covid 19 pneumonia s/p remdesivir and dexamethason, MSSA bacteremia/pneumonia w/ cavitary consolidation on CT chest and Proteus bacteremia s/p Imipenem (completed 12/28), Stenotrophomonas PNA (s/p Levaquin). Break through fevers last several days with sputum growing Stenotrophomonas again  - WIll restart Levaquin as per ID  - f/u ID    #Heme  Hx DVT on Xarelto  -Anticoagulation with Lovenox 100BID      #Endo  - ISS q6hrs  - NPH q6hrs    #Dispo: Pt stable for transfer to RCU, cleared by infection control    Edison Castillo MD  Pulmonary & Critical Care Fellow  [phone] Patient is a 60 year old Female with a PMH of HTN, DVT on Xarelto, peritonitis s/p Oral's procedure and ileostomy (reversed 2011), AARON who presented to [facility] on 11/18  w/ fevers found to have COVID-19, intubated 11/23, course complicated by superimposed PNA and bacteremia with Stenotrophomonas (12/11, completed Levaquin x7 days), MSSA/P. mirablis bacteremia (on Cefazolin, potentially due to urine vs line-associated and MSSA in sputum due to PNA), also with lung abscesses on CT chest on 12/5 , ARTEMIO/ATN requiring CRRT and HD. Now unable to wean from ventilator and transferred from [facility] on 12/10 to offload COVID unit. Unable to wean from ventilator, s/p tracheostmy with ctsx 12/18 planned for PEG 12/24 but unable to perform due to large ventral hernia     #Neuro - Alert & oriented x3 @baseline. Opens eyes to voice but not following commands with no purposeful movements off all IV sedation.   - c/w methadone and valium 5mg TID, wean as tolerated  - plan for spot EEG and possible MRI     #Resp  Acute hypoxemic respiratory failure 2/2 covid-19 PNA, intubated 11/23. Course complicated by MSSA pneumonia, stenotrophomonas, p. mirabalis, and lung abscess on CT chest from 12/5. Unable to wean from ventilator now trached by ctx on 12/18. Now with significant positional air leak from tracheotomy    - c/w AC 28/370/10/40%  still unable to tolerate PS trial   - Trached by CT surg 12/18. CT surg to remove trach sutures on 1/1/21  - Pt noted to have large leak. Discussed plan for possible trach upsize with CT surge. CT surge to see patient.       #CV  Hx of DVT on Xarelto  - c/w home metoprolol and amlodipine and monitor BP    - CT negative for PE  - c/w Lovenox 80 BID for prior VTE    #GI  - Continue TF, tolerating well  - Continue bowel regimen  - c/w PPI   - Per Dr. Cruz from CT surge, Due to Ventral Hernia, pt is not a candidate for Percutaneous gastrostomy. Consulted general surgery for possible open gastrostomy tube, deferring for now.  - c/w free water boluses    #Renal  ARTEMIO 2/2 ATN requiring CRRT and HD now recovered  - will trend electrolytes and replete as necessary  - Rivera in place  - strict I’s and O’s  - Repeat BMP this afternoon to monitor hypokalemia after repletion    #ID  Covid 19 pneumonia s/p remdesivir and dexamethason, MSSA bacteremia/pneumonia w/ cavitary consolidation on CT chest and Proteus bacteremia s/p Imipenem (completed 12/28), Stenotrophomonas PNA (s/p Levaquin). Break through fevers last several days with sputum growing Stenotrophomonas again  - WIll restart Levaquin as per ID  - f/u ID    #Heme  Hx DVT on Xarelto  -Anticoagulation with Lovenox 100BID      #Endo  - ISS q6hrs  - NPH q6hrs    #Dispo: Pt stable for transfer to RCU, cleared by infection control    Edison Castillo MD  Pulmonary & Critical Care Fellow  [phone]

## 2020-12-29 LAB
-  CEFTAZIDIME: SIGNIFICANT CHANGE UP
-  LEVOFLOXACIN: SIGNIFICANT CHANGE UP
-  TRIMETHOPRIM/SULFAMETHOXAZOLE: SIGNIFICANT CHANGE UP
ALBUMIN SERPL ELPH-MCNC: 1.4 G/DL — LOW (ref 3.3–5)
ALP SERPL-CCNC: 50 U/L — SIGNIFICANT CHANGE UP (ref 40–120)
ALT FLD-CCNC: 7 U/L — SIGNIFICANT CHANGE UP (ref 4–33)
ANION GAP SERPL CALC-SCNC: 5 MMOL/L — LOW (ref 7–14)
ANION GAP SERPL CALC-SCNC: 8 MMOL/L — SIGNIFICANT CHANGE UP (ref 7–14)
AST SERPL-CCNC: 19 U/L — SIGNIFICANT CHANGE UP (ref 4–32)
BILIRUB SERPL-MCNC: 0.5 MG/DL — SIGNIFICANT CHANGE UP (ref 0.2–1.2)
BUN SERPL-MCNC: 20 MG/DL — SIGNIFICANT CHANGE UP (ref 7–23)
BUN SERPL-MCNC: 24 MG/DL — HIGH (ref 7–23)
CALCIUM SERPL-MCNC: 6.9 MG/DL — LOW (ref 8.4–10.5)
CALCIUM SERPL-MCNC: 7.9 MG/DL — LOW (ref 8.4–10.5)
CHLORIDE SERPL-SCNC: 109 MMOL/L — HIGH (ref 98–107)
CHLORIDE SERPL-SCNC: 116 MMOL/L — HIGH (ref 98–107)
CO2 SERPL-SCNC: 35 MMOL/L — HIGH (ref 22–31)
CO2 SERPL-SCNC: 36 MMOL/L — HIGH (ref 22–31)
CREAT SERPL-MCNC: 0.63 MG/DL — SIGNIFICANT CHANGE UP (ref 0.5–1.3)
CREAT SERPL-MCNC: 0.67 MG/DL — SIGNIFICANT CHANGE UP (ref 0.5–1.3)
CULTURE RESULTS: SIGNIFICANT CHANGE UP
GLUCOSE BLDC GLUCOMTR-MCNC: 108 MG/DL — HIGH (ref 70–99)
GLUCOSE BLDC GLUCOMTR-MCNC: 79 MG/DL — SIGNIFICANT CHANGE UP (ref 70–99)
GLUCOSE BLDC GLUCOMTR-MCNC: 81 MG/DL — SIGNIFICANT CHANGE UP (ref 70–99)
GLUCOSE BLDC GLUCOMTR-MCNC: 83 MG/DL — SIGNIFICANT CHANGE UP (ref 70–99)
GLUCOSE BLDC GLUCOMTR-MCNC: 87 MG/DL — SIGNIFICANT CHANGE UP (ref 70–99)
GLUCOSE BLDC GLUCOMTR-MCNC: 93 MG/DL — SIGNIFICANT CHANGE UP (ref 70–99)
GLUCOSE BLDC GLUCOMTR-MCNC: 99 MG/DL — SIGNIFICANT CHANGE UP (ref 70–99)
GLUCOSE SERPL-MCNC: 100 MG/DL — HIGH (ref 70–99)
GLUCOSE SERPL-MCNC: 86 MG/DL — SIGNIFICANT CHANGE UP (ref 70–99)
HCT VFR BLD CALC: 31.9 % — LOW (ref 34.5–45)
HGB BLD-MCNC: 8.6 G/DL — LOW (ref 11.5–15.5)
MAGNESIUM SERPL-MCNC: 1.5 MG/DL — LOW (ref 1.6–2.6)
MAGNESIUM SERPL-MCNC: 1.8 MG/DL — SIGNIFICANT CHANGE UP (ref 1.6–2.6)
MCHC RBC-ENTMCNC: 27 GM/DL — LOW (ref 32–36)
MCHC RBC-ENTMCNC: 28.8 PG — SIGNIFICANT CHANGE UP (ref 27–34)
MCV RBC AUTO: 106.7 FL — HIGH (ref 80–100)
METHOD TYPE: SIGNIFICANT CHANGE UP
NRBC # BLD: 0 /100 WBCS — SIGNIFICANT CHANGE UP
NRBC # FLD: 0.03 K/UL — HIGH
ORGANISM # SPEC MICROSCOPIC CNT: SIGNIFICANT CHANGE UP
ORGANISM # SPEC MICROSCOPIC CNT: SIGNIFICANT CHANGE UP
PHOSPHATE SERPL-MCNC: 3.5 MG/DL — SIGNIFICANT CHANGE UP (ref 2.5–4.5)
PHOSPHATE SERPL-MCNC: 3.6 MG/DL — SIGNIFICANT CHANGE UP (ref 2.5–4.5)
PLATELET # BLD AUTO: 269 K/UL — SIGNIFICANT CHANGE UP (ref 150–400)
POTASSIUM SERPL-MCNC: 3.5 MMOL/L — SIGNIFICANT CHANGE UP (ref 3.5–5.3)
POTASSIUM SERPL-MCNC: 4 MMOL/L — SIGNIFICANT CHANGE UP (ref 3.5–5.3)
POTASSIUM SERPL-SCNC: 3.5 MMOL/L — SIGNIFICANT CHANGE UP (ref 3.5–5.3)
POTASSIUM SERPL-SCNC: 4 MMOL/L — SIGNIFICANT CHANGE UP (ref 3.5–5.3)
PROT SERPL-MCNC: 3.8 G/DL — LOW (ref 6–8.3)
RBC # BLD: 2.99 M/UL — LOW (ref 3.8–5.2)
RBC # FLD: 21 % — HIGH (ref 10.3–14.5)
SODIUM SERPL-SCNC: 152 MMOL/L — HIGH (ref 135–145)
SODIUM SERPL-SCNC: 157 MMOL/L — HIGH (ref 135–145)
SPECIMEN SOURCE: SIGNIFICANT CHANGE UP
WBC # BLD: 14.52 K/UL — HIGH (ref 3.8–10.5)
WBC # FLD AUTO: 14.52 K/UL — HIGH (ref 3.8–10.5)

## 2020-12-29 PROCEDURE — 99223 1ST HOSP IP/OBS HIGH 75: CPT | Mod: GC

## 2020-12-29 PROCEDURE — 95816 EEG AWAKE AND DROWSY: CPT | Mod: 26

## 2020-12-29 PROCEDURE — 99232 SBSQ HOSP IP/OBS MODERATE 35: CPT

## 2020-12-29 RX ORDER — MAGNESIUM SULFATE 500 MG/ML
2 VIAL (ML) INJECTION ONCE
Refills: 0 | Status: COMPLETED | OUTPATIENT
Start: 2020-12-29 | End: 2020-12-29

## 2020-12-29 RX ORDER — SODIUM CHLORIDE 9 MG/ML
1000 INJECTION, SOLUTION INTRAVENOUS
Refills: 0 | Status: COMPLETED | OUTPATIENT
Start: 2020-12-29 | End: 2020-12-29

## 2020-12-29 RX ORDER — SODIUM CHLORIDE 9 MG/ML
1000 INJECTION, SOLUTION INTRAVENOUS
Refills: 0 | Status: DISCONTINUED | OUTPATIENT
Start: 2020-12-29 | End: 2020-12-29

## 2020-12-29 RX ORDER — CEFTAZIDIME 6 G/30ML
2 INJECTION, POWDER, FOR SOLUTION INTRAVENOUS EVERY 8 HOURS
Refills: 0 | Status: DISCONTINUED | OUTPATIENT
Start: 2020-12-29 | End: 2021-01-04

## 2020-12-29 RX ADMIN — SODIUM CHLORIDE 50 MILLILITER(S): 9 INJECTION, SOLUTION INTRAVENOUS at 08:22

## 2020-12-29 RX ADMIN — Medication 5 MILLIGRAM(S): at 05:50

## 2020-12-29 RX ADMIN — HUMAN INSULIN 3 UNIT(S): 100 INJECTION, SUSPENSION SUBCUTANEOUS at 17:12

## 2020-12-29 RX ADMIN — METHADONE HYDROCHLORIDE 5 MILLIGRAM(S): 40 TABLET ORAL at 13:31

## 2020-12-29 RX ADMIN — CEFTAZIDIME 100 GRAM(S): 6 INJECTION, POWDER, FOR SOLUTION INTRAVENOUS at 18:23

## 2020-12-29 RX ADMIN — METHADONE HYDROCHLORIDE 5 MILLIGRAM(S): 40 TABLET ORAL at 21:38

## 2020-12-29 RX ADMIN — Medication 5 MILLIGRAM(S): at 21:32

## 2020-12-29 RX ADMIN — Medication 50 MILLIGRAM(S): at 17:12

## 2020-12-29 RX ADMIN — ENOXAPARIN SODIUM 100 MILLIGRAM(S): 100 INJECTION SUBCUTANEOUS at 05:50

## 2020-12-29 RX ADMIN — CHLORHEXIDINE GLUCONATE 15 MILLILITER(S): 213 SOLUTION TOPICAL at 06:30

## 2020-12-29 RX ADMIN — HUMAN INSULIN 3 UNIT(S): 100 INJECTION, SUSPENSION SUBCUTANEOUS at 11:37

## 2020-12-29 RX ADMIN — PANTOPRAZOLE SODIUM 40 MILLIGRAM(S): 20 TABLET, DELAYED RELEASE ORAL at 11:36

## 2020-12-29 RX ADMIN — METHADONE HYDROCHLORIDE 5 MILLIGRAM(S): 40 TABLET ORAL at 05:50

## 2020-12-29 RX ADMIN — SODIUM CHLORIDE 100 MILLILITER(S): 9 INJECTION, SOLUTION INTRAVENOUS at 13:32

## 2020-12-29 RX ADMIN — Medication 50 MILLIGRAM(S): at 05:50

## 2020-12-29 RX ADMIN — Medication 5 MILLIGRAM(S): at 13:41

## 2020-12-29 RX ADMIN — Medication 50 GRAM(S): at 08:22

## 2020-12-29 RX ADMIN — CHLORHEXIDINE GLUCONATE 15 MILLILITER(S): 213 SOLUTION TOPICAL at 17:12

## 2020-12-29 RX ADMIN — AMLODIPINE BESYLATE 5 MILLIGRAM(S): 2.5 TABLET ORAL at 05:50

## 2020-12-29 NOTE — PROGRESS NOTE ADULT - ATTENDING COMMENTS
Agree with plan as outlined above. Patient seen and examined at bedside. Patient history, laboratory data, and imaging personally reviewed.    Pt is a 60F with PMHX as above presenting to [facility] for hypoxemic respiratory failure with ARDS 2/2 COVID19 PNA further c/b superimposed MSSA cavitary PNA and ARF requiring HD. Pt with failure to wean from mechanical ventilation, s/p tracheostomy placement 12/18 and transfer to RCU 12/28.     Pt neurologically remains with minimal consciousness, opens eyes slightly but has no purposeful movements. Remains off all IV sedation since 12/24, is on small dose of methadone+diazepam for vent synchrony. CT Head (-) 12/12. Will check spot EEG and possible MRI.     Pt with trach placement (CTSx, 12/18 6XLT), now with ventilator dependence and difficulty weaning. Has intermittent air leak. Bedside bronchoscopy significant for dynamic airway collapse distal to trach. ABGs wnl, pt remains hemodynamically stable with no evidence of respiratory distress. Discussed with thoracic sx team, may need to upsize trach. Sutures in place until 1/1. Will f/u further recs. Will c/w SBTs as tolerated. Airway clearance therapy and trach care as per RCU team. Pt with bleeding from trach site, likely 2/2 excessive suctioning. Will hold Lovenox tonight and minimize suctioning with careful monitoring. CBCs stable.     Pt with MSSA bacteremia with cavitary PNA s/p completion of Abx with Primaxin on 12/28. With recurrent fevers and leukocytosis, growing resistant Stenotrophomonas in sputum. Levofloxacin switched to Ceftazidime today. Tolerating feeds via NGT, pt unable to get PEG given large ventral hernia. Poor candidate for surgical open PEG placement for now. Bowel regimen in place. GI ppx with PPI. Pt also initially with ARF on CRRT, following by intermittent HD (last session 12/17) with renal recovery. Now with worsening hypernatremia, IVF started and free H20 via NGT increased. NPH+HISS for glucose control.

## 2020-12-29 NOTE — PROGRESS NOTE ADULT - ASSESSMENT
59 yo woman with AARON, h/o Lockett's procedure, s/p reversal 2011 who was admitted to [facility] 11/17 with covid pneumonia.  She received course of remdesevir and dexamethasone; required intubation 11/23 for acute hypoxemic respiratory failure. Hospital course complicated by Proteus bacteremia, MSSA bacteremia/ pneumonia, and Stenotrophomonas respiratory infection, as well as ATN requiring CVVHD --> HD ---. off dialysis.  CT chest 12/4 new large consolidation with cavitation in right lung.  Patient transferred [facility] 12/10; spiked high fever; antibiotics broadened to vanco x 1 and meropenem 500 mg iv 24h.  Blood culture 11/27 MSSA and proteus; 11/28 MSSA.  Bacteremia cleared.   Blood cultures 12/1, 12/11, 12/21, 12/27 no growth.  Sputum culture 12/1, 12/12, 12/27 Stenotrophomonas.    Multifocal covid pneumonia with respiratory failure, MSSA bacteremia 11/27 with cavitary pneumonia, Proteus bacteremia 11/27, Stenotrophomonas pneumonia, ATN.  CxR 12/16- large right lung opacity.  aspiration pneumonia superimposed on ARDS due to covid.   s/p trach 12/18.  Fever, blood tinged sputum and Stenotrophomonas in sputum.  PNA.    suggest:  agree with switch to ceftazidine  duration 7 days      Mila Burgos MD  Pager: [phone]  After 5 PM or weekends please call fellow on call or office [phone]          ID service available for questions over weekend.    Mila Burgos MD  Pager: [phone]  After 5 PM or weekends please call fellow on call or office [phone]

## 2020-12-29 NOTE — ED ADULT NURSE REASSESSMENT NOTE - NS ED NURSE REASSESS COMMENT FT1
Report received from Suellen Romero RN.  Pt is resting comfortably in bed.  BP is low, MD Lam aware.  VS as per flow sheet.  Waiting for pending lab results and for bed assignment.  Will continue to monitor. Report received from Suellen Romero RN.  Unable to assess because pt at CT.  Will assess upon return. 0

## 2020-12-29 NOTE — PROGRESS NOTE ADULT - SUBJECTIVE AND OBJECTIVE BOX
Follow Up:      Inverval History/ROS:Patient is a 60y old  Female who presents with a chief complaint of Transfer from [facility] (22 Dec 2020 08:13)     transferred to RCU.  trach secretions blood tinged.  sputum with Stenotrophomonas.         Allergies    Kiwi (Unknown)  latex (Anaphylaxis)  latex (Unknown)  penicillin (Other)  penicillin (Unknown)  perfume  hives (Other)  potassium acetate (Other)  soap additives hives (Other)    Intolerances        ANTIMICROBIALS:  cefTAZidime  IVPB 2 every 8 hours          OTHER MEDS:  chlorhexidine 0.12% Liquid 15 milliLiter(s) Oral Mucosa every 12 hours  chlorhexidine 4% Liquid 1 Application(s) Topical <User Schedule>  dexMEDEtomidine Infusion 0.5 MICROgram(s)/kG/Hr IV Continuous <Continuous>  diazepam    Tablet 30 milliGRAM(s) Oral three times a day  heparin  Infusion. 1900 Unit(s)/Hr IV Continuous <Continuous>  insulin lispro (ADMELOG) corrective regimen sliding scale   SubCutaneous every 6 hours  insulin NPH human recombinant 10 Unit(s) SubCutaneous every 6 hours  methadone   Solution 30 milliGRAM(s) Oral three times a day  midodrine 30 milliGRAM(s) Oral every 8 hours  pantoprazole  Injectable 40 milliGRAM(s) IV Push daily  polyethylene glycol 3350 17 Gram(s) Oral daily  senna Syrup 15 milliLiter(s) Oral two times a day    Vital Signs Last 24 Hrs  T(F): 97.6 (12-29-20 @ 17:10), Max: 99.4 (12-28-20 @ 17:58)  HR: 93 (12-29-20 @ 17:10)  BP: 112/50 (12-29-20 @ 17:10)  RR: 30 (12-29-20 @ 17:10)  SpO2: 100% (12-29-20 @ 17:10) (92% - 100%)    PHYSICAL EXAM:  General: [x ] trach, blood tinged secretions  HEAD/EYES: [ ] PERRL [x ] white sclera [ ] icterus  ENT:  eschar chin, trach, NGT  Cardiovascular:   distant  Respiratory:  [ x] clear to ausculation bilaterally anteriorly  GI:  [x ] soft, scars, hernia, rectal tube  :  [x ] negro [] no CVA tenderness   Musculoskeletal:  [ ] no synovitis  Neurologic:  unable   Skin:  x[ ] no rash  Psychiatric:  unable  Lines:  [ x] no phlebitis bilat arrow                          8.6    14.52 )-----------( 269      ( 29 Dec 2020 07:18 )             31.9 12-29    152  |  109  |  20  ----------------------------<  100  3.5   |  35  |  0.63  Ca    7.9      29 Dec 2020 15:40Phos  3.5     12-29Mg     1.8     12-29  TPro  3.8  /  Alb  1.4  /  TBili  0.5  /  DBili  x   /  AST  19  /  ALT  7   /  AlkPhos  50  12-29          MICROBIOLOGY:  uCulture - Blood (12.27.20 @ 10:22)   Specimen Source: .Blood Blood   Culture Results:   No growth to date. Culture - Blood (12.27.20 @ 10:22)   Specimen Source: .Blood Blood   Culture Results:   No growth to date.   culCulture - Blood (12.21.20 @ 23:09)   Specimen Source: .Blood Blood-Venous   Culture Results:   No growth  Culture - Blood (12.21.20 @ 23:09)   Specimen Source: .Blood Blood-Peripheral   Culture Results:   No growth   RADIOLOGY:    rd< from: Xray Chest 1 View-PORTABLE IMMEDIATE (Xray Chest 1 View-PORTABLE IMMEDIATE .) (12.19.20 @ 20:25) >    EXAM:  XR CHEST PORTABLE IMMED 1V        PROCEDURE DATE:  Dec 19 2020         INTERPRETATION:  INDICATION: COVID. Nasogastric tube.    TECHNIQUE: 2 portable views of the chest.    COMPARISON: 12/18/2020    FINDINGS: There is a tracheostomy tube in place. There is a nasogastric tube with its tip below the level of this film. There is a right IJ catheter with tip overlying the superior vena cava. There are bilateral diffuse airspace opacities.    IMPRESSION: Nasogastric tube tip is below the level of these films. Bilateral diffuse airspace opacities.    < end of copied text >

## 2020-12-29 NOTE — EEG REPORT - NS EEG TEXT BOX
[facility]   COMPREHENSIVE EPILEPSY CENTER   REPORT OF ROUTINE VIDEO EEG     [facility]: [address], [place] [address], Ph#: [phone]  [facility]: [address], [place] [address], Ph#: [phone]      Patient Name: MIGUEL A AGUILA  Age and : 60y (62)  MRN #: [id number]  Location: [facility] 6S [address]  Referring Physician: Kaylin Garduno    Study Date: 20    _____________________________________________________________  TECHNICAL INFORMATION    Placement and Labeling of Electrodes:  The EEG was performed utilizing 20 channels referential EEG connections (coronal over temporal over parasagittal montage) using all standard 10-20 electrode placements with EKG.  Recording was at a sampling rate of 256 samples per second per channel.  Time synchronized digital video recording was done simultaneously with EEG recording.  A low light infrared camera was used for low light recording.  Gustavo and seizure detection algorithms were utilized.    _____________________________________________________________  HISTORY    Patient is a 60y old  Female with bactermia and covid-19.       PERTINENT MEDICATION:  diazepam    Tablet 5 milliGRAM(s) Oral three times a day    _____________________________________________________________  STUDY INTERPRETATION    Findings: The background was continuous, spontaneously variable and reactive. No posterior dominant rhythm seen.    Background Slowing:  Excess diffuse polymorphic delta slowing.    Focal Slowing:   None were present.    Sleep Background:  Drowsiness and stage II sleep transients were not recorded.    Other Non-Epileptiform Findings:  None were present.    Interictal Epileptiform Activity:   None were present.    Events:  Clinical events: None recorded.  Seizures: None recorded.    Activation Procedures:   Hyperventilation was not performed.    Photic stimulation was not performed.       Artifacts:  Intermittent myogenic and movement artifacts were noted.    ECG:  limited eval due to artifact.     _____________________________________________________________  EEG SUMMARY/CLASSIFICATION    Abnormal EEG in the awake states.    - Moderate generalized slowing.    _____________________________________________________________  EEG IMPRESSION/CLINICAL CORRELATE    Abnormal EEG study.  1.Moderate nonspecific diffuse or multifocal cerebral dysfunction.   2. No epileptiform pattern or seizure seen.    _____________________________________________________________        Prelim read     Dusty Steen MD  Epilepsy Fellow, [facility]      Epilepsy Reading Room Ph# [phone]  Epilepsy Answering Service after 5 pm and before 8:30 am: Ph# [phone] [facility]   COMPREHENSIVE EPILEPSY CENTER   REPORT OF ROUTINE VIDEO EEG     [facility]: [address], [place] [address], Ph#: [phone]  [facility]: [address], [place] [address], Ph#: [phone]      Patient Name: MIGUEL A AGUILA  Age and : 60y (62)  MRN #: [id number]  Location: [facility] [address]  Referring Physician: Kaylin Garduno    Study Date: 20    _____________________________________________________________  TECHNICAL INFORMATION    Placement and Labeling of Electrodes:  The EEG was performed utilizing 20 channels referential EEG connections (coronal over temporal over parasagittal montage) using all standard 10-20 electrode placements with EKG.  Recording was at a sampling rate of 256 samples per second per channel.  Time synchronized digital video recording was done simultaneously with EEG recording.  A low light infrared camera was used for low light recording.  Gustavo and seizure detection algorithms were utilized.    _____________________________________________________________  HISTORY  Patient is a 60y old  Female with bactermia and covid-19.     PERTINENT MEDICATION:  diazepam    Tablet 5 milliGRAM(s) Oral three times a day    _____________________________________________________________  STUDY INTERPRETATION    Findings: The background was continuous, spontaneously variable and reactive. No posterior dominant rhythm seen.    Background Slowing:  Excess diffuse polymorphic delta slowing.    Focal Slowing:   None were present.    Sleep Background:  Drowsiness and stage II sleep transients were not recorded.    Other Non-Epileptiform Findings:  None were present.    Interictal Epileptiform Activity:   None were present.    Events:  Clinical events: None recorded.  Seizures: None recorded.    Activation Procedures:   Hyperventilation was not performed.    Photic stimulation was not performed.     Artifacts:  Intermittent myogenic and movement artifacts were noted.    ECG:  limited eval due to artifact.     _____________________________________________________________  EEG SUMMARY/CLASSIFICATION  Abnormal EEG in the awake states.  - Moderate generalized slowing.    _____________________________________________________________  EEG IMPRESSION/CLINICAL CORRELATE    Abnormal EEG study.  1.Moderate nonspecific diffuse or multifocal cerebral dysfunction.   2. No epileptiform pattern or seizure seen.    _____________________________________________________________      Dusty Steen MD  Epilepsy Fellow, [facility]      Epilepsy Reading Room Ph# [phone]  Epilepsy Answering Service after 5 pm and before 8:30 am: Ph# [phone]

## 2020-12-29 NOTE — PROGRESS NOTE ADULT - ASSESSMENT
This is a 60 year old Female with a PMH of HTN, DVT on Xarelto, peritonitis s/p Oral's procedure and ileostomy (reversed 2011), AARON who presented to [facility] on 11/18  w/ fevers found to have COVID-19, intubated 11/23, course complicated by superimposed PNA and bacteremia with Stenotrophomonas (12/11, completed Levaquin x7 days), MSSA/P. mirablis bacteremia (on Cefazolin, potentially due to urine vs line-associated and MSSA in sputum due to PNA), also with lung abscesses on CT chest on 12/5 , ARTEMIO/ATN requiring CRRT and HD. Now unable to wean from ventilator and transferred from [facility] on 12/10 to offload COVID unit. Unable to wean from ventilator, s/p tracheostmy with ctsx 12/18 planned for PEG 12/24 but unable to perform due to large ventral hernia     #Neuro  - Alert & oriented x3 @baseline. Opens eyes to voice but not following commands with no purposeful movements off all IV sedation.   - c/w methadone and valium 5mg TID, wean as tolerated  - plan for spot EEG and possible MRI     #Resp  Acute hypoxemic respiratory failure 2/2 covid-19 PNA, intubated 11/23. Course complicated by MSSA pneumonia, stenotrophomonas, p. mirabalis, and lung abscess on CT chest from 12/5. Unable to wean from ventilator now trached by ctx on 12/18. Now with significant positional air leak from tracheotomy    - c/w AC 28/370/10/40%  still unable to tolerate PS trial   - Trached by CT surg 12/18. CT surg to remove trach sutures on 1/1/21  - Pt noted to have large leak. Discussed plan for possible trach upsize with CT surge. CT surge to see patient.       #CV  Hx of DVT on Xarelto was on heparin gtt but was stopped due to low H/H. Intermittently requiring norepinephrine most likely from vasoplegic shock from sedation medications, now hypertensive  - c/w home metoprolol and amlodipine and monitor BP    - CT negative for PE    #GI  - Continue TF, tolerating well  - Continue bowel regimen  - c/w PPI   - Per Dr. Cruz from CT surge, Due to Ventral Hernia, pt is not a candidate for Percutaneous gastrostomy. Consulted general surgery for possible open gastrostomy tube, deferring for now.    #  ARTEMIO 2/2 ATN requiring CRRT, then Bumex challenge and now intermittent HD, finished trial with Bumex yesterday with okay response. Started on Bumex gtt now making good UO. Bumex gtt d/c'ed.   -last HD 12/17, Bumex gtt d/c'ed. Now with renal recovery and making adequate UO not requiring HD   - will trend electrolytes and replete as necessary  - Rivera in place  -strict I’s and O’s    #ID  Covid 19 pneumonia, MSSA bacteremia/pneumonia, cavitary consolidation on CT chest.  - s/p remdesivir and dexamethasone   - + Sputum/blood MSSA, + P mirablis in blood, +Stenotrophomonas in sputum   - Was previously on Cefazolin for MSSA bacteremia. Pt continues with fevers, ABX broaden to Fadia and Vanco on 12/11  - s/p Levofloxacin   - caspofungin discontinued, no fungal growth.   - Blood cx from 12/7 NGTD. 12/12 sputum with gram negative rods now speciated to Stenotrophomonas   -will D/C meropenem (12/11-12/16)   - ID following, will touch base given breakthrough fevers  - f/u repeat cultures and UA  -c/w imipenem (12/16- 12/28) 500mg q12h x 2 weeks for cavitation and MSSA,     #Heme  Hx DVT on Xarelto Anemic 12/13 with no evidence of bleeding s/p 1u PRBC. Drop in Hgb on 12/23 with no signs of bleeding requiring 1u PRBC and heparin gtt was D/C'd   -Anticoagulation with Lovenox 100BID    -continue to trend CBC/ PTT  -will assess need for blood if Hgb continues to drop     #Endo  - ISS q6hrs  - NPH q6hrs    #Dispo: Pt stable for transfer to [facility]. COVID 19 swab negative on 12/9. Will follow up with infection control regarding eligibility   This is a 60 year old Female with a PMH of HTN, DVT on Xarelto, peritonitis s/p Oral's procedure and ileostomy (reversed 2011), AARON who presented to [facility] on 11/18  w/ fevers found to have COVID-19, intubated 11/23, course complicated by superimposed PNA and bacteremia with Stenotrophomonas (12/11, completed Levaquin x7 days), MSSA/P. mirablis bacteremia (on Cefazolin, potentially due to urine vs line-associated and MSSA in sputum due to PNA), also with lung abscesses on CT chest on 12/5 , ARTEMIO/ATN requiring CRRT and HD. Now unable to wean from ventilator and transferred from [facility] on 12/10 to offload COVID unit. Unable to wean from ventilator, s/p tracheostmy with ctsx 12/18 planned for PEG 12/24 but unable to perform due to large ventral hernia     # AMS  - Alert & oriented x3 @baseline.   - Pt is able to opens eyes to voice but does not follow any commands. No purposeful movements appreciated.   - c/w Methadone and Valium 5mg TID, wean slowly as tolerated  - Spot EEG - abnormal study- Moderate nonspecific diffuse or multifocal cerebral dysfunction   - Will consider MRI     # Acute hypoxemic respiratory failure 2/2 covid-19 PNA / MSSA Bacteremia/Pneumonia,  - s/p intubated 11/23. Course complicated by MSSA pneumonia, stenotrophomonas, p. mirabalis, and lung abscess on CT chest from 12/5.   - Pt has been unable to wean from ventilator, s/p Trached by CTSx on 12/18. Now with significant positional air leak from tracheotomy    - Plan for CTSx to remove trach sutures on 1/1/21  - Pt noted to have large leak. Discussed plan for possible trach upsize with CTSx.   - s/p Imipenem (12/16- 12/28) 500mg q12h x 2 weeks for cavitation and MSSA,  - Will switch Levaquin to Ceftazidime starting today (12/29 - ...) for 7 days course (due to resistance to Levaquin)    # Hypernatremia  - Na 155>>157>>152, s/p D5W 50cc/hr for few hours, and Free water [id number]  - Will increase free water to 450 q 4 - and d'shagufta D5W for now  - Will monitor in the morning     # Hx of DVT (on Xarelto at home)  - s/p heparin gtt in Surg B, but was stopped due to low H/H requiring transfusion of PRBC    - c/w home metoprolol and Amlodipine to better monitor Blood pressure    - CTA negative for PE    # ARTEMIO 2/2 ATN   - s/p CRRT, and Bumex and intermittent HD.   - Last HD 12/17, Bumex gtt d/c'ed. Now with good UOP   - c/w Rivera for now, will consider TOV in the near future  - Srict I’s and O’s    # Diet  - Pt is not a candidate for Percutaneous Gastrostomy due to large ventral hernia as per Dr. Cruz  - General surgery to evaluate for Possible Open Gastrostomy tube  - c/w NGT feeding for now, tolerating well  - c/w bowel regimen  - c/w NPH q6hr, and ISS - Monitor FS routinely    #Dispo   - [facility]   - PT/OT recs pending

## 2020-12-29 NOTE — PROGRESS NOTE ADULT - SUBJECTIVE AND OBJECTIVE BOX
CHIEF COMPLAINT: Patient is a 60y old  Female who presents with a chief complaint of Transfer from [facility] (28 Dec 2020 10:50)    Interval Events: None reported overnight    REVIEW OF SYSTEMS:  Constitutional:   Eyes:  ENT:  CV:  Resp:  GI:  :  MSK:  Integumentary:  Neurological:  Psychiatric:  Endocrine:  Hematologic/Lymphatic:  Allergic/Immunologic:  [ ] All other systems negative  [ ] Unable to assess ROS because ________    OBJECTIVE:  ICU Vital Signs Last 24 Hrs  T(C): 36.3 (29 Dec 2020 05:47), Max: 38.9 (28 Dec 2020 12:00)  T(F): 97.3 (29 Dec 2020 05:47), Max: 102 (28 Dec 2020 12:00)  HR: 74 (29 Dec 2020 06:24) (74 - 99)  BP: 133/54 (29 Dec 2020 05:47) (117/44 - 133/54)  BP(mean): --  ABP: 108/44 (28 Dec 2020 16:00) (97/42 - 113/46)  ABP(mean): 67 (28 Dec 2020 16:00) (63 - 72)  RR: 28 (29 Dec 2020 05:47) (24 - 28)  SpO2: 98% (29 Dec 2020 06:24) (91% - 100%)    Mode: AC/ CMV (Assist Control/ Continuous Mandatory Ventilation), RR (machine): 28, TV (machine): 370, FiO2: 40, PEEP: 10, MAP: 17, PIP: 30     @ 07:01  -   @ 07:00  --------------------------------------------------------  IN: 1610 mL / OUT: 2475 mL / NET: -865 mL      CAPILLARY BLOOD GLUCOSE      POCT Blood Glucose.: 93 mg/dL (29 Dec 2020 06:20)      PHYSICAL EXAM:  General:   HEENT:   Lymph Nodes:  Neck:   Respiratory:   Cardiovascular:   Abdomen:   Extremities:   Skin:   Neurological:  Psychiatry:    HOSPITAL MEDICATIONS:  MEDICATIONS  (STANDING):  amLODIPine   Tablet 5 milliGRAM(s) Oral daily  chlorhexidine 0.12% Liquid 15 milliLiter(s) Oral Mucosa every 12 hours  diazepam    Tablet 5 milliGRAM(s) Oral three times a day  enoxaparin Injectable 100 milliGRAM(s) SubCutaneous every 12 hours  insulin lispro (ADMELOG) corrective regimen sliding scale   SubCutaneous every 6 hours  insulin NPH human recombinant 3 Unit(s) SubCutaneous every 6 hours  levoFLOXacin IVPB 750 milliGRAM(s) IV Intermittent every 24 hours  methadone   Solution 5 milliGRAM(s) Oral every 8 hours  metoprolol tartrate 50 milliGRAM(s) Oral two times a day  pantoprazole  Injectable 40 milliGRAM(s) IV Push daily  polyethylene glycol 3350 17 Gram(s) Oral daily  potassium chloride  10 mEq/100 mL IVPB 10 milliEquivalent(s) IV Intermittent every 1 hour  senna Syrup 15 milliLiter(s) Oral two times a day    MEDICATIONS  (PRN):  acetaminophen    Suspension .. 650 milliGRAM(s) Oral every 6 hours PRN Temp greater or equal to 38.5C (101.3F)      LABS:                        8.1    16.73 )-----------( 280      ( 28 Dec 2020 03:53 )             29.0     12-    155<H>  |  112<H>  |  25<H>  ----------------------------<  118<H>  4.1   |  35<H>  |  0.78    Ca    7.9<L>      28 Dec 2020 15:04  Phos  3.5       Mg     1.7         TPro  6.5  /  Alb  2.2<L>  /  TBili  0.5  /  DBili  x   /  AST  19  /  ALT  5   /  AlkPhos  83  12-    PT/INR - ( 28 Dec 2020 03:53 )   PT: 13.7 sec;   INR: 1.20 ratio         PTT - ( 28 Dec 2020 03:53 )  PTT:34.9 sec  Urinalysis Basic - ( 27 Dec 2020 10:20 )    Color: Yellow / Appearance: Slightly Turbid / S.015 / pH: x  Gluc: x / Ketone: Trace  / Bili: Small / Urobili: 12 mg/dL   Blood: x / Protein: 30 mg/dL / Nitrite: Negative   Leuk Esterase: Moderate / RBC: 7 /HPF / WBC 58 /HPF   Sq Epi: x / Non Sq Epi: 5 /HPF / Bacteria: Few      Arterial Blood Gas:  - @ 03:53  7.50/46/121/35/98.8/11.3  ABG lactate: --        MICROBIOLOGY:     RADIOLOGY:  [ ] Reviewed and interpreted by me    PULMONARY FUNCTION TESTS:    EKG: CHIEF COMPLAINT: Patient is a 60y old  Female who presents with a chief complaint of Transfer from [facility] (28 Dec 2020 10:50)    Interval Events: None reported overnight. Clinically unchanged. Hypernatremia improving (Na 157>>152). will c/w free water for now. Sputum cx + for Stenotrophomonas, will switch Abx to Ceftazidime for 7 days course. ID following.     REVIEW OF SYSTEMS:    [x] Unable to assess ROS because AMS    OBJECTIVE:  ICU Vital Signs Last 24 Hrs  T(C): 36.3 (29 Dec 2020 05:47), Max: 38.9 (28 Dec 2020 12:00)  T(F): 97.3 (29 Dec 2020 05:47), Max: 102 (28 Dec 2020 12:00)  HR: 74 (29 Dec 2020 06:24) (74 - 99)  BP: 133/54 (29 Dec 2020 05:47) (117/44 - 133/54)  BP(mean): --  ABP: 108/44 (28 Dec 2020 16:00) (97/42 - 113/46)  ABP(mean): 67 (28 Dec 2020 16:00) (63 - 72)  RR: 28 (29 Dec 2020 05:47) (24 - 28)  SpO2: 98% (29 Dec 2020 06:24) (91% - 100%)    Mode: AC/ CMV (Assist Control/ Continuous Mandatory Ventilation), RR (machine): 28, TV (machine): 370, FiO2: 40, PEEP: 10, MAP: 17, PIP: 30     @ 07:01  -   @ 07:00  --------------------------------------------------------  IN: 1610 mL / OUT: 2475 mL / NET: -865 mL      CAPILLARY BLOOD GLUCOSE      POCT Blood Glucose.: 93 mg/dL (29 Dec 2020 06:20)      PHYSICAL EXAM  General: laying in bed in NAD, saturating with current vent settings  Neck: +trach, area c/d/i  Respiratory: +coarse breath sounds noted throughout  Cardiovascular: +s1, s2  Abdomen: +obese abdomen, soft, nt/nd, no peritoneal signs noted  Extremities: 2+ pitting edema noted throughout b/l upper/lower ext  Skin: as per rn assessment sheet  Neurological: alert and oriented x 0      HOSPITAL MEDICATIONS:  MEDICATIONS  (STANDING):  amLODIPine   Tablet 5 milliGRAM(s) Oral daily  chlorhexidine 0.12% Liquid 15 milliLiter(s) Oral Mucosa every 12 hours  diazepam    Tablet 5 milliGRAM(s) Oral three times a day  enoxaparin Injectable 100 milliGRAM(s) SubCutaneous every 12 hours  insulin lispro (ADMELOG) corrective regimen sliding scale   SubCutaneous every 6 hours  insulin NPH human recombinant 3 Unit(s) SubCutaneous every 6 hours  levoFLOXacin IVPB 750 milliGRAM(s) IV Intermittent every 24 hours  methadone   Solution 5 milliGRAM(s) Oral every 8 hours  metoprolol tartrate 50 milliGRAM(s) Oral two times a day  pantoprazole  Injectable 40 milliGRAM(s) IV Push daily  polyethylene glycol 3350 17 Gram(s) Oral daily  potassium chloride  10 mEq/100 mL IVPB 10 milliEquivalent(s) IV Intermittent every 1 hour  senna Syrup 15 milliLiter(s) Oral two times a day    MEDICATIONS  (PRN):  acetaminophen    Suspension .. 650 milliGRAM(s) Oral every 6 hours PRN Temp greater or equal to 38.5C (101.3F)      LABS:                        8.1    16.73 )-----------( 280      ( 28 Dec 2020 03:53 )             29.0         155<H>  |  112<H>  |  25<H>  ----------------------------<  118<H>  4.1   |  35<H>  |  0.78    Ca    7.9<L>      28 Dec 2020 15:04  Phos  3.5       Mg     1.7         TPro  6.5  /  Alb  2.2<L>  /  TBili  0.5  /  DBili  x   /  AST  19  /  ALT  5   /  AlkPhos  83      PT/INR - ( 28 Dec 2020 03:53 )   PT: 13.7 sec;   INR: 1.20 ratio         PTT - ( 28 Dec 2020 03:53 )  PTT:34.9 sec  Urinalysis Basic - ( 27 Dec 2020 10:20 )    Color: Yellow / Appearance: Slightly Turbid / S.015 / pH: x  Gluc: x / Ketone: Trace  / Bili: Small / Urobili: 12 mg/dL   Blood: x / Protein: 30 mg/dL / Nitrite: Negative   Leuk Esterase: Moderate / RBC: 7 /HPF / WBC 58 /HPF   Sq Epi: x / Non Sq Epi: 5 /HPF / Bacteria: Few      Arterial Blood Gas:   @ 03:53  7.50/46/121/35/98.8/11.3  ABG lactate: --        MICROBIOLOGY:     RADIOLOGY:  [ ] Reviewed and interpreted by me    PULMONARY FUNCTION TESTS:    EKG:

## 2020-12-30 ENCOUNTER — TRANSCRIPTION ENCOUNTER (OUTPATIENT)
Age: 60
End: 2020-12-30

## 2020-12-30 LAB
ANION GAP SERPL CALC-SCNC: 7 MMOL/L — SIGNIFICANT CHANGE UP (ref 7–14)
APPEARANCE UR: CLEAR — SIGNIFICANT CHANGE UP
BILIRUB UR-MCNC: NEGATIVE — SIGNIFICANT CHANGE UP
BUN SERPL-MCNC: 16 MG/DL — SIGNIFICANT CHANGE UP (ref 7–23)
CALCIUM SERPL-MCNC: 8.5 MG/DL — SIGNIFICANT CHANGE UP (ref 8.4–10.5)
CHLORIDE SERPL-SCNC: 107 MMOL/L — SIGNIFICANT CHANGE UP (ref 98–107)
CO2 SERPL-SCNC: 35 MMOL/L — HIGH (ref 22–31)
COLOR SPEC: YELLOW — SIGNIFICANT CHANGE UP
CREAT SERPL-MCNC: 0.66 MG/DL — SIGNIFICANT CHANGE UP (ref 0.5–1.3)
DIFF PNL FLD: NEGATIVE — SIGNIFICANT CHANGE UP
GLUCOSE BLDC GLUCOMTR-MCNC: 100 MG/DL — HIGH (ref 70–99)
GLUCOSE BLDC GLUCOMTR-MCNC: 108 MG/DL — HIGH (ref 70–99)
GLUCOSE BLDC GLUCOMTR-MCNC: 95 MG/DL — SIGNIFICANT CHANGE UP (ref 70–99)
GLUCOSE BLDC GLUCOMTR-MCNC: 97 MG/DL — SIGNIFICANT CHANGE UP (ref 70–99)
GLUCOSE SERPL-MCNC: 111 MG/DL — HIGH (ref 70–99)
GLUCOSE UR QL: NEGATIVE — SIGNIFICANT CHANGE UP
GRAM STN FLD: SIGNIFICANT CHANGE UP
HCT VFR BLD CALC: 29.2 % — LOW (ref 34.5–45)
HGB BLD-MCNC: 8 G/DL — LOW (ref 11.5–15.5)
KETONES UR-MCNC: NEGATIVE — SIGNIFICANT CHANGE UP
LEUKOCYTE ESTERASE UR-ACNC: NEGATIVE — SIGNIFICANT CHANGE UP
MAGNESIUM SERPL-MCNC: 1.6 MG/DL — SIGNIFICANT CHANGE UP (ref 1.6–2.6)
MCHC RBC-ENTMCNC: 27.4 GM/DL — LOW (ref 32–36)
MCHC RBC-ENTMCNC: 28.7 PG — SIGNIFICANT CHANGE UP (ref 27–34)
MCV RBC AUTO: 104.7 FL — HIGH (ref 80–100)
NITRITE UR-MCNC: NEGATIVE — SIGNIFICANT CHANGE UP
NRBC # BLD: 0 /100 WBCS — SIGNIFICANT CHANGE UP
NRBC # FLD: 0.04 K/UL — HIGH
PH UR: 6.5 — SIGNIFICANT CHANGE UP (ref 5–8)
PHOSPHATE SERPL-MCNC: 3.6 MG/DL — SIGNIFICANT CHANGE UP (ref 2.5–4.5)
PLATELET # BLD AUTO: 314 K/UL — SIGNIFICANT CHANGE UP (ref 150–400)
POTASSIUM SERPL-MCNC: 4 MMOL/L — SIGNIFICANT CHANGE UP (ref 3.5–5.3)
POTASSIUM SERPL-SCNC: 4 MMOL/L — SIGNIFICANT CHANGE UP (ref 3.5–5.3)
PROT UR-MCNC: ABNORMAL
RBC # BLD: 2.79 M/UL — LOW (ref 3.8–5.2)
RBC # FLD: 19.7 % — HIGH (ref 10.3–14.5)
SODIUM SERPL-SCNC: 149 MMOL/L — HIGH (ref 135–145)
SP GR SPEC: 1.02 — SIGNIFICANT CHANGE UP (ref 1.01–1.02)
SPECIMEN SOURCE: SIGNIFICANT CHANGE UP
UROBILINOGEN FLD QL: ABNORMAL
WBC # BLD: 13.9 K/UL — HIGH (ref 3.8–10.5)
WBC # FLD AUTO: 13.9 K/UL — HIGH (ref 3.8–10.5)

## 2020-12-30 PROCEDURE — 99232 SBSQ HOSP IP/OBS MODERATE 35: CPT | Mod: 57

## 2020-12-30 PROCEDURE — 99233 SBSQ HOSP IP/OBS HIGH 50: CPT | Mod: GC

## 2020-12-30 RX ORDER — ENOXAPARIN SODIUM 100 MG/ML
100 INJECTION SUBCUTANEOUS ONCE
Refills: 0 | Status: COMPLETED | OUTPATIENT
Start: 2020-12-30 | End: 2020-12-30

## 2020-12-30 RX ORDER — SODIUM CHLORIDE 9 MG/ML
1000 INJECTION, SOLUTION INTRAVENOUS
Refills: 0 | Status: COMPLETED | OUTPATIENT
Start: 2020-12-30 | End: 2020-12-30

## 2020-12-30 RX ORDER — ENOXAPARIN SODIUM 100 MG/ML
100 INJECTION SUBCUTANEOUS EVERY 12 HOURS
Refills: 0 | Status: DISCONTINUED | OUTPATIENT
Start: 2020-12-30 | End: 2020-12-30

## 2020-12-30 RX ORDER — CHLORHEXIDINE GLUCONATE 213 G/1000ML
15 SOLUTION TOPICAL EVERY 12 HOURS
Refills: 0 | Status: DISCONTINUED | OUTPATIENT
Start: 2020-12-30 | End: 2021-01-07

## 2020-12-30 RX ADMIN — Medication 650 MILLIGRAM(S): at 19:25

## 2020-12-30 RX ADMIN — METHADONE HYDROCHLORIDE 5 MILLIGRAM(S): 40 TABLET ORAL at 05:25

## 2020-12-30 RX ADMIN — Medication 5 MILLIGRAM(S): at 22:08

## 2020-12-30 RX ADMIN — SODIUM CHLORIDE 100 MILLILITER(S): 9 INJECTION, SOLUTION INTRAVENOUS at 14:52

## 2020-12-30 RX ADMIN — CEFTAZIDIME 100 GRAM(S): 6 INJECTION, POWDER, FOR SOLUTION INTRAVENOUS at 18:09

## 2020-12-30 RX ADMIN — CHLORHEXIDINE GLUCONATE 15 MILLILITER(S): 213 SOLUTION TOPICAL at 05:25

## 2020-12-30 RX ADMIN — Medication 650 MILLIGRAM(S): at 12:26

## 2020-12-30 RX ADMIN — CHLORHEXIDINE GLUCONATE 15 MILLILITER(S): 213 SOLUTION TOPICAL at 18:10

## 2020-12-30 RX ADMIN — AMLODIPINE BESYLATE 5 MILLIGRAM(S): 2.5 TABLET ORAL at 05:24

## 2020-12-30 RX ADMIN — Medication 650 MILLIGRAM(S): at 12:56

## 2020-12-30 RX ADMIN — CEFTAZIDIME 100 GRAM(S): 6 INJECTION, POWDER, FOR SOLUTION INTRAVENOUS at 10:02

## 2020-12-30 RX ADMIN — METHADONE HYDROCHLORIDE 5 MILLIGRAM(S): 40 TABLET ORAL at 22:08

## 2020-12-30 RX ADMIN — METHADONE HYDROCHLORIDE 5 MILLIGRAM(S): 40 TABLET ORAL at 14:52

## 2020-12-30 RX ADMIN — ENOXAPARIN SODIUM 100 MILLIGRAM(S): 100 INJECTION SUBCUTANEOUS at 18:10

## 2020-12-30 RX ADMIN — SENNA PLUS 15 MILLILITER(S): 8.6 TABLET ORAL at 05:24

## 2020-12-30 RX ADMIN — Medication 50 MILLIGRAM(S): at 05:25

## 2020-12-30 RX ADMIN — CEFTAZIDIME 100 GRAM(S): 6 INJECTION, POWDER, FOR SOLUTION INTRAVENOUS at 01:46

## 2020-12-30 RX ADMIN — Medication 650 MILLIGRAM(S): at 18:35

## 2020-12-30 RX ADMIN — Medication 1 MILLIGRAM(S): at 12:24

## 2020-12-30 RX ADMIN — Medication 5 MILLIGRAM(S): at 05:24

## 2020-12-30 RX ADMIN — Medication 5 MILLIGRAM(S): at 14:52

## 2020-12-30 RX ADMIN — Medication 50 MILLIGRAM(S): at 18:10

## 2020-12-30 RX ADMIN — PANTOPRAZOLE SODIUM 40 MILLIGRAM(S): 20 TABLET, DELAYED RELEASE ORAL at 11:16

## 2020-12-30 NOTE — PROGRESS NOTE ADULT - SUBJECTIVE AND OBJECTIVE BOX
CHIEF COMPLAINT: Patient is a 60y old  Female who presents with a chief complaint of Transfer from [facility] (29 Dec 2020 17:26)      Interval Events: Pt seen and evaluated by team at bedside     REVIEW OF SYSTEMS:    [ ] Unable to assess ROS because ________    OBJECTIVE:  ICU Vital Signs Last 24 Hrs  T(C): 37.2 (30 Dec 2020 05:17), Max: 37.2 (30 Dec 2020 05:17)  T(F): 98.9 (30 Dec 2020 05:17), Max: 98.9 (30 Dec 2020 05:17)  HR: 100 (30 Dec 2020 05:17) (74 - 103)  BP: 132/57 (30 Dec 2020 05:17) (112/50 - 132/57)  BP(mean): --  ABP: --  ABP(mean): --  RR: 28 (30 Dec 2020 05:17) (28 - 30)  SpO2: 100% (30 Dec 2020 05:17) (98% - 100%)    Mode: AC/ CMV (Assist Control/ Continuous Mandatory Ventilation), RR (machine): 28, TV (machine): 370, FiO2: 40, PEEP: 10, ITime: 1, MAP: 14, PIP: 25    12-28 @ 07:01 - 12-29 @ 07:00  --------------------------------------------------------  IN: 1610 mL / OUT: 2475 mL / NET: -865 mL    12-29 @ 07:01 - 12-30 @ 06:50  --------------------------------------------------------  IN: 3770 mL / OUT: 1450 mL / NET: 2320 mL      CAPILLARY BLOOD GLUCOSE      POCT Blood Glucose.: 95 mg/dL (30 Dec 2020 05:16)      HOSPITAL MEDICATIONS:  MEDICATIONS  (STANDING):  amLODIPine   Tablet 5 milliGRAM(s) Oral daily  cefTAZidime  IVPB 2 Gram(s) IV Intermittent every 8 hours  chlorhexidine 0.12% Liquid 15 milliLiter(s) Oral Mucosa every 12 hours  diazepam    Tablet 5 milliGRAM(s) Oral three times a day  insulin lispro (ADMELOG) corrective regimen sliding scale   SubCutaneous every 6 hours  methadone   Solution 5 milliGRAM(s) Oral every 8 hours  metoprolol tartrate 50 milliGRAM(s) Oral two times a day  pantoprazole  Injectable 40 milliGRAM(s) IV Push daily  polyethylene glycol 3350 17 Gram(s) Oral daily  potassium chloride  10 mEq/100 mL IVPB 10 milliEquivalent(s) IV Intermittent every 1 hour  senna Syrup 15 milliLiter(s) Oral two times a day    MEDICATIONS  (PRN):  acetaminophen    Suspension .. 650 milliGRAM(s) Oral every 6 hours PRN Temp greater or equal to 38.5C (101.3F)      LABS:                        8.6    14.52 )-----------( 269      ( 29 Dec 2020 07:18 )             31.9     12-29    152<H>  |  109<H>  |  20  ----------------------------<  100<H>  3.5   |  35<H>  |  0.63    Ca    7.9<L>      29 Dec 2020 15:40  Phos  3.5     12-29  Mg     1.8     12-29    TPro  3.8<L>  /  Alb  1.4<L>  /  TBili  0.5  /  DBili  x   /  AST  19  /  ALT  7   /  AlkPhos  50  12-29              MICROBIOLOGY:     RADIOLOGY:  [ ] Reviewed and interpreted by me    PULMONARY FUNCTION TESTS:    EKG: CHIEF COMPLAINT: Patient is a 60y old  Female who presents with a chief complaint of Transfer from [facility] (29 Dec 2020 17:26)      Interval Events: Pt seen and evaluated by team at bedside     REVIEW OF SYSTEMS:    [x ] Unable to assess ROS because AMS    OBJECTIVE:  ICU Vital Signs Last 24 Hrs  T(C): 37.2 (30 Dec 2020 05:17), Max: 37.2 (30 Dec 2020 05:17)  T(F): 98.9 (30 Dec 2020 05:17), Max: 98.9 (30 Dec 2020 05:17)  HR: 100 (30 Dec 2020 05:17) (74 - 103)  BP: 132/57 (30 Dec 2020 05:17) (112/50 - 132/57)  BP(mean): --  ABP: --  ABP(mean): --  RR: 28 (30 Dec 2020 05:17) (28 - 30)  SpO2: 100% (30 Dec 2020 05:17) (98% - 100%)    Mode: AC/ CMV (Assist Control/ Continuous Mandatory Ventilation), RR (machine): 28, TV (machine): 370, FiO2: 40, PEEP: 10, ITime: 1, MAP: 14, PIP: 25    12-28 @ 07:01 - 12-29 @ 07:00  --------------------------------------------------------  IN: 1610 mL / OUT: 2475 mL / NET: -865 mL    12-29 @ 07:01 - 12-30 @ 06:50  --------------------------------------------------------  IN: 3770 mL / OUT: 1450 mL / NET: 2320 mL      CAPILLARY BLOOD GLUCOSE      POCT Blood Glucose.: 95 mg/dL (30 Dec 2020 05:16)      HOSPITAL MEDICATIONS:  MEDICATIONS  (STANDING):  amLODIPine   Tablet 5 milliGRAM(s) Oral daily  cefTAZidime  IVPB 2 Gram(s) IV Intermittent every 8 hours  chlorhexidine 0.12% Liquid 15 milliLiter(s) Oral Mucosa every 12 hours  diazepam    Tablet 5 milliGRAM(s) Oral three times a day  insulin lispro (ADMELOG) corrective regimen sliding scale   SubCutaneous every 6 hours  methadone   Solution 5 milliGRAM(s) Oral every 8 hours  metoprolol tartrate 50 milliGRAM(s) Oral two times a day  pantoprazole  Injectable 40 milliGRAM(s) IV Push daily  polyethylene glycol 3350 17 Gram(s) Oral daily  potassium chloride  10 mEq/100 mL IVPB 10 milliEquivalent(s) IV Intermittent every 1 hour  senna Syrup 15 milliLiter(s) Oral two times a day    MEDICATIONS  (PRN):  acetaminophen    Suspension .. 650 milliGRAM(s) Oral every 6 hours PRN Temp greater or equal to 38.5C (101.3F)      LABS:                        8.6    14.52 )-----------( 269      ( 29 Dec 2020 07:18 )             31.9     12-29    152<H>  |  109<H>  |  20  ----------------------------<  100<H>  3.5   |  35<H>  |  0.63    Ca    7.9<L>      29 Dec 2020 15:40  Phos  3.5     12-29  Mg     1.8     12-29    TPro  3.8<L>  /  Alb  1.4<L>  /  TBili  0.5  /  DBili  x   /  AST  19  /  ALT  7   /  AlkPhos  50  12-29              MICROBIOLOGY:     RADIOLOGY:  [ ] Reviewed and interpreted by me    PULMONARY FUNCTION TESTS:    EKG:

## 2020-12-30 NOTE — PROGRESS NOTE ADULT - ATTENDING COMMENTS
Agree with plan as outlined above. Patient seen and examined at bedside. Patient history, laboratory data, and imaging personally reviewed.    Pt is a 60F with PMHX as above presenting to [facility] for hypoxemic respiratory failure with ARDS 2/2 COVID19 PNA further c/b superimposed MSSA cavitary PNA and ARF requiring HD. Pt with failure to wean from mechanical ventilation, s/p tracheostomy placement 12/18 and transfer to [facility] 12/28.     Pt neurologically remains with minimal consciousness, opens eyes slightly but has no purposeful movements. Remains off all IV sedation since 12/24, is on small dose of methadone+diazepam for vent synchrony. CT Head (-) 12/12.  EEG (-). Will consider possible MRI once more stable.    Pt with trach placement (CTSx, 12/18 Proximal 7XLT), now with ventilator dependence and difficulty weaning. Has intermittent air leak, worse today than on prior days. Bedside bronchoscopy significant for dynamic airway collapse distal to trach but with good trach position. ABGs wnl, pt remains hemodynamically stable with no evidence of respiratory distress. Discussed with thoracic sx team, may need to evaluate in OR tomorrow. Trach positional at bedside, air leak improved with tightening of sutures, placing pt supine, and changing to pressure control ventilation. Sutures in place until 1/1. Will c/w SBTs as tolerated. Airway clearance therapy and trach care as per R[facility] team. Pt with bleeding from trach site yesterday, likely 2/2 excessive suctioning, now significantly improved. Lovenox restarted today. CBCs stable.     Pt with MSSA bacteremia with cavitary PNA s/p completion of Abx with Primaxin on 12/28. With recurrent fevers and leukocytosis, growing resistant Stenotrophomonas in sputum. Levofloxacin switched to Ceftazidime 12/29, plan for 7 day course through 1/5. Tolerating feeds via NGT, pt unable to get PEG given large ventral hernia. Poor candidate for surgical open PEG placement for now. Bowel regimen in place. GI ppx with PPI. Pt also initially with ARF on CRRT, following by intermittent HD (last session 12/17) with renal recovery. Hypernatremia improving, will c/w IVF and free H20 via NGT increased. NPH+HISS for glucose control. Wound consult recs appreciated, pt will need Plastic Sx consult once more stable.

## 2020-12-30 NOTE — OCCUPATIONAL THERAPY INITIAL EVALUATION ADULT - ADDITIONAL COMMENTS
Patient unable to provide social history/previous level of function. Per chart review, patient lives in a private home with her spouse and children with steps to manage. Patient was independent with ADLs and functional mobility.

## 2020-12-30 NOTE — PHYSICAL THERAPY INITIAL EVALUATION ADULT - PRECAUTIONS/LIMITATIONS, REHAB EVAL
isolation precautions/oxygen therapy device and L/min
isolation precautions/oxygen therapy device and L/min

## 2020-12-30 NOTE — DISCHARGE NOTE PROVIDER - NSDCMRMEDTOKEN_GEN_ALL_CORE_FT
acetaminophen 160 mg/5 mL oral suspension: 20.31 milliliter(s) orally every 6 hours, As needed, Temp greater or equal to 38C (100.4F)  albuterol 90 mcg/inh inhalation aerosol: 4 puff(s) inhaled every 6 hours  ceFAZolin 2 g intravenous injection: 2 gram(s) intravenous every 24 hours  dexmedetomidine:   heparin:   insulin isophane (NPH) 100 units/mL human recombinant subcutaneous suspension: 10 unit(s) subcutaneous every 6 hours  insulin lispro 100 units/mL injectable solution:  injectable   ipratropium CFC free 17 mcg/inh inhalation aerosol: 2 puff(s) inhaled every 6 hours  lactulose 10 g/15 mL oral syrup: 22.5 milliliter(s) orally 2 times a day  midodrine 10 mg oral tablet: 3 tab(s) orally every 8 hours  pantoprazole 40 mg intravenous injection: 40 milligram(s) intravenous once a day  polyethylene glycol 3350 oral powder for reconstitution: 17 gram(s) orally every 12 hours   albuterol 90 mcg/inh inhalation aerosol: 2 puff(s) inhaled every 6 hours  amLODIPine 5 mg oral tablet: 1 tab(s) orally once a day  cholestyramine 4 g/9 g oral powder for reconstitution: 1 packet(s) orally 2 times a day  clonazePAM 0.5 mg oral tablet: 1 tab(s) orally once a day  collagenase 250 units/g topical ointment: 1 application topically 2 times a day  guaiFENesin 100 mg/5 mL oral liquid: 5 milliliter(s) orally every 6 hours, As needed, Cough  lactobacillus acidophilus oral capsule: 1 tab(s) orally 2 times a day  metFORMIN 500 mg oral tablet: 1 tab(s) orally 2 times a day  metoprolol tartrate 50 mg oral tablet: 1 tab(s) orally 2 times a day  Multiple Vitamins oral tablet: 1 tab(s) orally once a day  nystatin 100,000 units/g topical powder: 1 application topically every 8 hours  to arm pits  ocular lubricant ophthalmic solution: 1 drop(s) to each affected eye every 8 hours, As needed, Dry Eyes  Protonix 40 mg oral delayed release tablet: 1 tab(s) orally once a day  rivaroxaban 20 mg oral tablet: 1 tab(s) orally once a day (before a meal)  zinc sulfate 220 mg oral capsule: 1 cap(s) orally once a day

## 2020-12-30 NOTE — DISCHARGE NOTE PROVIDER - INSTRUCTIONS
Glucerna at 30cc/hr   No carb prosource 3 pkts /day  Glucerna at 30cc/hr   No carb prosource 3 pkts /day   Mechanical soft with thin liquids diet.   Mechanical soft with thin liquids diet.

## 2020-12-30 NOTE — PHYSICAL THERAPY INITIAL EVALUATION ADULT - LIVES WITH, PROFILE
Pt lives in a house with 14 steps to negotiate./children/spouse
Pt lives in a house with 14 steps to negotiate./children/spouse

## 2020-12-30 NOTE — DISCHARGE NOTE PROVIDER - CARE PROVIDER_API CALL
Iveth Brock E  SURGERY  [id number] [place] [address] [place] [address]  Phone: [phone]  Fax: [phone]  Follow Up Time:

## 2020-12-30 NOTE — ADVANCED PRACTICE NURSE CONSULT - REASON FOR CONSULT
Patient known to [facility] service line, seen today for re-evaluation of skin impairment. Chart reviewed: Pt h/o HTN, DVT on Xarelto, peritonitis s/p Oral's procedure and ileostomy (reversed 2011), AARON who presented to [facility] on 11/18  w/ fevers found to have COVID-19, intubated 11/23, course complicated by superimposed PNA and bacteremia with Stenotrophomonas (12/11, completed Levaquin x7 days), MSSA/P. mirablis bacteremia (on Cefazolin, potentially due to urine vs line-associated and MSSA in sputum due to PNA), also with lung abscesses on CT chest on 12/5 , ARTEMIO/ATN requiring CRRT and HD. Now unable to wean from ventilator and transferred from [facility] on 12/10 to offload COVID unit. Unable to wean from ventilator, s/p tracheostmy with ctsx 12/18 planned for PEG 12/24 but unable to perform due to large ventral hernia. Labs reviewed: WBC 14.52k/uL, H/H 8.6/31.9, Plt 269, INR 1.2, BMI 44.9kg/m2, Ayush 12.  
Patient seen on skin care rounds after wound care referral received for assessment of skin impairment and recommendations of topical management. Chart reviewed: Serum WBC 7.55, H/H 8.0/24.9, platelets 167, INR 1.49, c-reactive protein 10.36, Ayush 6, BMI 44.9kg/m2. Patient H/O AARON, peritonitis s/p Oral's procedure and ileostomy (reversed 2011), HTN, prior DVT/PE, Asthma admitted on 11/18/2020 to [facility] after presenting for  productive cough  w/ SOB x9 days and diarrhea x7 days and fever x1 day. Patient was found to be COVID + on 11/17/2020 and completed remdesivir 11/18-11/22. Patient was intubated for airway protection on 11/23/2020. Patient has required proning (last 12/4/2020). Hospital course c/b by ATN, requiring CVVHD now on intermittent HD, unresponsive to bumex challenge last HD 12/8. Course also c/b Stenotrophomonas (12/11, completed Levaquin x7 days), MSSA/P. mirablis bacteremia (on Cefazolin, potentially due to urine vs line-associated and MSSA in sputum due to PNA), also with lung abscesses on CT chest on 12/5.  Patient currently on Volume AC 30/350/8/35% on Precedex. Patient intermittently opens eyes but is not yet responsive. Transferred over to [facility] on 12/10 to offload [facility] COVID ICU.
Patient seen for re-evaluation

## 2020-12-30 NOTE — DISCHARGE NOTE PROVIDER - NSDCCPCAREPLAN_GEN_ALL_CORE_FT
PRINCIPAL DISCHARGE DIAGNOSIS  Diagnosis: COVID-19  Assessment and Plan of Treatment:       SECONDARY DISCHARGE DIAGNOSES  Diagnosis: AMS (altered mental status)  Assessment and Plan of Treatment:     Diagnosis: DVT (deep venous thrombosis)  Assessment and Plan of Treatment:     Diagnosis: ARTEMIO (acute kidney injury)  Assessment and Plan of Treatment: ARTEMIO (acute kidney injury)    Diagnosis: Failure respiratory  Assessment and Plan of Treatment:      PRINCIPAL DISCHARGE DIAGNOSIS  Diagnosis: COVID-19  Assessment and Plan of Treatment:       SECONDARY DISCHARGE DIAGNOSES  Diagnosis: DM (diabetes mellitus)  Assessment and Plan of Treatment:     Diagnosis: AMS (altered mental status)  Assessment and Plan of Treatment:     Diagnosis: DVT (deep venous thrombosis)  Assessment and Plan of Treatment:     Diagnosis: ARTEMIO (acute kidney injury)  Assessment and Plan of Treatment: ARTEMIO (acute kidney injury)    Diagnosis: Failure respiratory  Assessment and Plan of Treatment:      PRINCIPAL DISCHARGE DIAGNOSIS  Diagnosis: COVID-19  Assessment and Plan of Treatment: You have been diagnosed with the COVID-19 virus during your hospital stay. You were intubated on 11/23, had tracheostomy on 12/18 by CT surgery (sutures removed 1/1) and you were decannulated on 3/1. Your oxygen saturations are now stable on room air.  Monitor for fevers, shortness of breath and cough primarily.  Monitor your temperature daily to not any changes and increases.    Your [facility] Department of Health can be reached at [phone] for further information about COVID-19.      SECONDARY DISCHARGE DIAGNOSES  Diagnosis: Lung abscess  Assessment and Plan of Treatment: You were found to have an abscess in your lung. You had sputum cultures that showed bacteria and also blood cultures showed bacteria in your blood. You were treated with antibiotics and your abscess is now resolved. Your repeat CT of your chest showed improvement of your abscess therefore you do not need anymore antibiotics.    Diagnosis: AMS (altered mental status)  Assessment and Plan of Treatment: You had altered mental status and  anxiety which has now resolved. CT of your head and EEG (brain monitor) at that time now was normal. Klonopin was started for anxiety. Please follow up with your PCP.    Diagnosis: DVT (deep venous thrombosis)  Assessment and Plan of Treatment: Please continue Xarelto (blood thinner). Please monitor for bleeding while on this medication.    Diagnosis: Clostridium difficile infection  Assessment and Plan of Treatment: You were found to have Clostridium difficile infection. You finished oral Vancomycin (antibiotic) 2/1-2/25. Diarrhea now resolved and forming formed stools. Please take Lomotil as needed for diarrhea. Please follow up with your PCP.    Diagnosis: ARTEMIO (acute kidney injury)  Assessment and Plan of Treatment: Your kidney was not functioning properly therefore you were started on dialysis. Your kidney function resolved and no longer require dialysis. Please repeat your labs with your PCP in 1 week.     PRINCIPAL DISCHARGE DIAGNOSIS  Diagnosis: COVID-19  Assessment and Plan of Treatment: You have been diagnosed with the COVID-19 virus during your hospital stay. You were intubated on 11/23, had tracheostomy on 12/18 by CT surgery (sutures removed 1/1) and you were decannulated on 3/1. Your oxygen saturations are now stable on room air.  Monitor for fevers, shortness of breath and cough primarily.  Monitor your temperature daily to not any changes and increases.    Your [facility] Department of Health can be reached at [phone] for further information about COVID-19.      SECONDARY DISCHARGE DIAGNOSES  Diagnosis: Lung abscess  Assessment and Plan of Treatment: You were found to have an abscess in your lung. You had sputum cultures that showed bacteria and also blood cultures showed bacteria in your blood. You were treated with antibiotics and your abscess is now resolved. Your repeat CT of your chest showed improvement of your abscess therefore you do not need anymore antibiotics.    Diagnosis: Clostridium difficile infection  Assessment and Plan of Treatment: You were found to have Clostridium difficile infection. You finished oral Vancomycin (antibiotic) 2/1-2/25. Diarrhea now resolved and forming formed stools. Please take Lomotil as needed for diarrhea. Please follow up with your PCP.    Diagnosis: AMS (altered mental status)  Assessment and Plan of Treatment: You had altered mental status and  anxiety which has now resolved. CT of your head and EEG (brain monitor) at that time now was normal. Klonopin was started for anxiety. Please follow up with your PCP.    Diagnosis: DVT (deep venous thrombosis)  Assessment and Plan of Treatment: Please continue Xarelto (blood thinner). Please monitor for bleeding while on this medication.    Diagnosis: ARTEMIO (acute kidney injury)  Assessment and Plan of Treatment: Your kidney was not functioning properly therefore you were started on dialysis. Your kidney function resolved and no longer require dialysis. Please repeat your labs with your PCP in 1 week.

## 2020-12-30 NOTE — PROGRESS NOTE ADULT - ASSESSMENT
This is a 60 year old Female with a PMH of HTN, DVT on Xarelto, peritonitis s/p Oral's procedure and ileostomy (reversed 2011), AARON who presented to [facility] on 11/18  w/ fevers found to have COVID-19, intubated 11/23, course complicated by superimposed PNA and bacteremia with Stenotrophomonas (12/11, completed Levaquin x7 days), MSSA/P. mirablis bacteremia (on Cefazolin, potentially due to urine vs line-associated and MSSA in sputum due to PNA), also with lung abscesses on CT chest on 12/5 , ARTEMIO/ATN requiring CRRT and HD. Now unable to wean from ventilator and transferred from [facility] on 12/10 to offload COVID unit. Unable to wean from ventilator, s/p tracheostmy with ctsx 12/18 planned for PEG 12/24 but unable to perform due to large ventral hernia     # AMS  - Alert & oriented x3 @baseline.   - Pt is able to opens eyes to voice but does not follow any commands. No purposeful movements appreciated.   - c/w Methadone and Valium 5mg TID, wean slowly as tolerated  - Spot EEG - abnormal study- Moderate nonspecific diffuse or multifocal cerebral dysfunction   - Will consider MRI     # Acute hypoxemic respiratory failure 2/2 covid-19 PNA / MSSA Bacteremia/Pneumonia,  - s/p intubated 11/23. Course complicated by MSSA pneumonia, stenotrophomonas, p. mirabalis, and lung abscess on CT chest from 12/5.   - Pt has been unable to wean from ventilator, s/p Trached by CTSx on 12/18. Now with significant positional air leak from tracheotomy    - Plan for CTSx to remove trach sutures on 1/1/21  - Pt noted to have large leak. Discussed plan for possible trach upsize with CTSx.   - s/p Imipenem (12/16- 12/28) 500mg q12h x 2 weeks for cavitation and MSSA,  - Will switch Levaquin to Ceftazidime starting today (12/29 - ...) for 7 days course (due to resistance to Levaquin)    # Hypernatremia  - Na 155>>157>>152, s/p D5W 50cc/hr for few hours, and Free water [id number]  - Will increase free water to 450 q 4 - and d'shagufta D5W for now  - Will monitor in the morning     # Hx of DVT (on Xarelto at home)  - s/p heparin gtt in Surg B, but was stopped due to low H/H requiring transfusion of PRBC    - c/w home metoprolol and Amlodipine to better monitor Blood pressure    - CTA negative for PE    # ARTEMIO 2/2 ATN   - s/p CRRT, and Bumex and intermittent HD.   - Last HD 12/17, Bumex gtt d/c'ed. Now with good UOP   - c/w Rivera for now, will consider TOV in the near future  - Srict I’s and O’s    # Diet  - Pt is not a candidate for Percutaneous Gastrostomy due to large ventral hernia as per Dr. Cruz  - General surgery to evaluate for Possible Open Gastrostomy tube  - c/w NGT feeding for now, tolerating well  - c/w bowel regimen  - c/w NPH q6hr, and ISS - Monitor FS routinely    #Dispo   - RCU   - PT/OT recs pending   This is a 60 year old Female with a PMH of HTN, DVT on Xarelto, peritonitis s/p Oral's procedure and ileostomy (reversed 2011), AARON who presented to [facility] on 11/18  w/ fevers found to have COVID-19, intubated 11/23, course complicated by superimposed PNA and bacteremia with Stenotrophomonas (12/11, completed Levaquin x7 days), MSSA/P. mirablis bacteremia (on Cefazolin, potentially due to urine vs line-associated and MSSA in sputum due to PNA), also with lung abscesses on CT chest on 12/5 , ARTEMIO/ATN requiring CRRT and HD. Now unable to wean from ventilator and transferred from [facility] on 12/10 to offload COVID unit. Unable to wean from ventilator, s/p tracheostmy with ctsx 12/18 planned for PEG 12/24 but unable to perform due to large ventral hernia     # AMS  - Alert & oriented x3 @baseline.   - Pt is able to opens eyes to voice but does not follow any commands. No purposeful movements appreciated.   - c/w Methadone and Valium 5mg TID, wean slowly as tolerated  - Spot EEG - abnormal study- Moderate nonspecific diffuse or multifocal cerebral dysfunction   - Will consider MRI     # Acute hypoxemic respiratory failure 2/2 covid-19 PNA / MSSA Bacteremia/Pneumonia,  - s/p intubated 11/23. Course complicated by MSSA pneumonia, stenotrophomonas, p. mirabalis, and lung abscess on CT chest from 12/5.   - Pt has been unable to wean from ventilator, s/p Trached by CTSx on 12/18. Now with significant positional air leak from tracheotomy    - Plan for CTSx to remove trach sutures on 1/1/21  - Pt noted to have large leak. Discussed plan for possible trach upsize with CTSx.   - s/p Imipenem (12/16- 12/28) 500mg q12h x 2 weeks for cavitation and MSSA,  - Will switch Levaquin to Ceftazidime starting today (12/29 - ...) for 7 days course (due to resistance to Levaquin) until 1/5/2021    # Hypernatremia  - Na 155>>157>>152, s/p D5W 50cc/hr for few hours, and Free water [id number]  - Will increase free water to 450 q 4 - and d'shagufta D5W for now  - Will monitor -Na+ 149 today - continue free water      # Hx of DVT (on Xarelto at home)  - s/p heparin gtt in Surg B, but was stopped due to low H/H requiring transfusion of PRBC    - c/w home metoprolol and Amlodipine to better monitor Blood pressure    - CTA negative for PE  - Lovenox restarted give one dose - npo after MN for trach exchange     # ARTEMIO 2/2 ATN   - s/p CRRT, and Bumex and intermittent HD.   - Last HD 12/17, Bumex gtt d/c'ed. Now with good UOP   - c/w Rivera for now, will consider TOV in the near future  - Srict I’s and O’s    # Diet  - Pt is not a candidate for Percutaneous Gastrostomy due to large ventral hernia as per Dr. Cruz  - General surgery to evaluate for Possible Open Gastrostomy tube  - c/w NGT feeding for now, tolerating well  - c/w bowel regimen  - c/w NPH q6hr, and ISS - Monitor FS routinely  - FS accucheck <100 past 24 h -Stop NPH and monitor     #HTN   -amlodipine /metorpolol   - Monitor bp with parameters     #Dispo   - RCU   - PT/OT recs pending

## 2020-12-30 NOTE — ADVANCED PRACTICE NURSE CONSULT - RECOMMEDATIONS
Tracheostomy- Cleanse around trach site with NS. Pat dry. Apply Silicone foam dressing without border beneath trach collar, cut mid dressing to "Y" shape. Change foam dressing every shift and prn. Cleanse intertriginous folds of neck with luke-warm soap and water, dry well. Apply Interdry textile sheting leaving 2" out at end to wick, remove to wash, change every 3 to 5 days or or soiled/compromised. Change trach ties every 3 days.     Nasogastric Tube- Cleanse nare with NS. Pat dry. Apply Liquid barrier film to periwound skin. Apply Stat-lock NGT marvin over center of nare, not touch any skin circumferentially. Change every three days or prn if soiled or compromised.      Once stable, consider Plastic surgery consult for eschar to left cheek and chin.    Topical Recommendations:  Tracheostomy- Cleanse around trach site with NS. Pat dry. Apply Silicone foam dressing without border beneath trach collar, cut mid dressing to "Y" shape. Change foam dressing every shift and prn. Cleanse intertriginous folds of neck with luke-warm soap and water, dry well. Apply Interdry textile sheting leaving 2" out at end to wick, remove to wash, change every 3 to 5 days or or soiled/compromised. Change trach ties every 3 days.     Nasogastric Tube- Cleanse nare with NS. Pat dry. Apply Liquid barrier film to periwound skin. Apply Stat-lock NGT marvin over center of nare, not touch any skin circumferentially. Change every three days or prn if soiled or compromised.     Chin and Left cheek: Apply Betadine, allow to dry. Daily.    Sacral fold to bilateral buttock: Cleanse with skin cleanser (perineal spray), pat dry. Apply TRIAD paste twice a day and PRN.     Bowel management system- Cleanse ewa-rectal area with perineal spray when indicated. Apply liquid barrier film to ewa-rectal skin. Apply 4x4 silicone foam dressing without border and cut to mid dressing with a "Y" shape. Change dressing every and apply liquid barrier film every shift. Check balloon every shift and record volume.     Intertriginous folds: Place Interdry textile sheeting, remove to wash & dry affected area, then replace. Individual sheeting may be used for up to 5 days unless soiled.     Continue low air loss bed therapy, continue heel elevation, continue to turn & reposition q2h, continue moisture management as recommended above & single breathable pad. Continue measures to decrease friction/shear/pressure with use of Hovermat turn assist device and low airloss support surface. Continue with nutritional support as per dietary/orders.     Please call wound care service line is further assistance is needed ([phone]).

## 2020-12-30 NOTE — CHART NOTE - NSCHARTNOTEFT_GEN_A_CORE
Nutrition Follow-Up Note   Reason for follow-up: Critical care length of stay follow- up. Medical record reviewed. Patient unable to participate in interview, +trach.    Diet Order: Diet, NPO with Tube Feed:   Tube Feeding Modality: Nasogastric  Glucerna 1.5 Pelon (GLUCERNA1.5RTH)  Total Volume for 24 Hours (mL): 720  Continuous  Starting Tube Feed Rate {mL per Hour}: 10  Increase Tube Feed Rate by (mL): 10     Every 4 hours  Until Goal Tube Feed Rate (mL per Hour): 30  Tube Feed Duration (in Hours): 24  Tube Feed Start Time: 16:00  No Carb Prosource TF     Qty per Day:  3 (12-26-20 @ 16:12)    CURRENT EN ORDER PROVIDES:  Total Volume: 720mL/day  Energy: 82915gbv/day  Protein: 104g protein/day  Free Water: 546mL/day    Interval history: Patient intubated 11/23, course complicated by superimposed PNA and bacteremia, MSSA/P. mirablis bacteremia also with lung abscesses on CT chest on 12/5 , ARTEMIO/ATN requiring CRRT and HD, Last HD on 12/17/20.  Transferred from [facility] on 12/10 to offload COVID unit. Unable to wean from ventilator, s/p tracheostmy 12/18 planned for PEG 12/24 but unable to perform due to large ventral hernia. Patient covid negative 12/09    Nutrition Related Information: Patient continues with NGT for tube feeds. Currently receiving Glucerna 1.5 @ goal rate of 35mL/hr. Current TF order providing suboptimal energy needs. Would consider increasing goal rate, recs to follow below. Patient tolerating feeds. Finger sticks WDL. Patient with hypernatremia (Na 152), free water bolus 450mL q4hr ordered at this time.    GI: No vomiting, abdominal distention reported. +moderate loose stool 12/30 - patient is ordered for Senna and Miralax.    Anthropometric Measurements:   Height (cm): 160 (12/10)  Weight (kg): 126.4 (12/30, bed-scale), 128.3 (12/19), 114 (12/14), 115 (12-10-20)  *Noted weight changes, possible scale discrepancies versus fluid shifts as patient previously receiving HD.   BMI (kg/m2): 44.9 (12-10-20)  IBW: 52.2kg +/-10%    Medications: MEDICATIONS  (STANDING):  amLODIPine   Tablet 5 milliGRAM(s) Oral daily  cefTAZidime  IVPB 2 Gram(s) IV Intermittent every 8 hours  diazepam    Tablet 5 milliGRAM(s) Oral three times a day  enoxaparin Injectable 100 milliGRAM(s) SubCutaneous every 12 hours  insulin lispro (ADMELOG) corrective regimen sliding scale   SubCutaneous every 6 hours  methadone   Solution 5 milliGRAM(s) Oral every 8 hours  metoprolol tartrate 50 milliGRAM(s) Oral two times a day  pantoprazole  Injectable 40 milliGRAM(s) IV Push daily  potassium chloride  10 mEq/100 mL IVPB 10 milliEquivalent(s) IV Intermittent every 1 hour    MEDICATIONS  (PRN):  acetaminophen    Suspension .. 650 milliGRAM(s) Oral every 6 hours PRN Temp greater or equal to 38.5C (101.3F)    Labs: 12-29 @ 15:40: Sodium 152<H>, Potassium 3.5, Calcium 7.9<L>, Magnesium 1.8, Phosphorus 3.5, BUN 20, Creatinine 0.63, Glucose 100<H>, Alk Phos --, ALT/SGPT --, AST/SGOT --, Albumin --, Prealbumin --, Total Bilirubin --, Hemoglobin --, Hematocrit --, Ferritin --, C-Reactive Protein --, Creatine Kinase <<27>    Triglycerides, Serum: 394 mg/dL (12-09-20 @ 02:07)    POCT Blood Glucose.: 108 mg/dL (12-30-20 @ 11:05)  POCT Blood Glucose.: 95 mg/dL (12-30-20 @ 05:16)  POCT Blood Glucose.: 81 mg/dL (12-29-20 @ 23:30)  POCT Blood Glucose.: 99 mg/dL (12-29-20 @ 17:01)    Skin: unstageable pressure injuries to left cheek, coccyx, and chin  Edema: 2+ right/left hand and feet    Estimated Nutrition Needs: calculated using admit weight (115kg) for energy needs and IBW for protein needs  Estimated Energy Needs (11-14Kcal/kg): 1265-1610Kcal/day  Estimated Protein Needs (2-2.5g/kg): 104-130g protein/day    Nutrition Interventions/Recommendations:   1. Recommend Glucerna 1.5 via NGT @ 35mL/hr x24 hours + No Carb Prosource (1pkg = 15 gm protein) 3x daily [provides 840mL total volume, 1260Kcal, 114g protein].  2. Feeds will provide ~637mL free water, additional free water per physician discretion.  3. Obtain weekly weights.   4. Consider multivitamin daily for micronutrient coverage.  5. Bowel regimen PRN.  6. Further adjustments to rate/volume/duration/free water provision of enteral feeds will be determined in accordance with long term patient tolerance, needs and weight trends.     Monitoring and Evaluation:   Monitor nutrition provision, tolerance to diet, weights, labs, skin integrity and GI function.  RD remains available, please consult as needed. Will follow up per protocol.  Xochitl Larsno, MS, RD, CDN, [facility] Pager #[phone]

## 2020-12-30 NOTE — OCCUPATIONAL THERAPY INITIAL EVALUATION ADULT - DIAGNOSIS, OT EVAL
s/p COVID PNA, s/p tracheostomy; inability to follow commands, inability to perform ADLs and functional mobility

## 2020-12-30 NOTE — DISCHARGE NOTE PROVIDER - HOSPITAL COURSE
Patient is a 60 year old Female with a PMH of HTN, DVT on Xarelto, peritonitis s/p Oral's procedure and ileostomy (reversed 2011), AARON who presented to [facility] on 11/18  w/ fevers found to have COVID-19, intubated 11/23, course complicated by superimposed PNA and bacteremia with Stenotrophomonas (12/11, completed Levaquin x7 days), MSSA/P. mirablis bacteremia (on Cefazolin, potentially due to urine vs line-associated and MSSA in sputum due to PNA), also with lung abscesses on CT chest on 12/5 , ARTEMIO/ATN requiring CRRT and HD. Now unable to wean from ventilator and transferred from [facility] on 12/10 to offload COVID unit. Unable to wean from ventilator, s/p tracheostmy with ctsx 12/18 planned for PEG 12/24 but unable to perform due to large ventral hernia     #Neuro - Alert & oriented x3 @baseline. Opens eyes to voice but not following commands with no purposeful movements off all IV sedation.   - c/w methadone and valium 5mg TID, wean as tolerated  - plan for spot EEG and possible MRI     #Resp  Acute hypoxemic respiratory failure 2/2 covid-19 PNA, intubated 11/23. Course complicated by MSSA pneumonia, stenotrophomonas, p. mirabalis, and lung abscess on CT chest from 12/5. Unable to wean from ventilator now trached by ctx on 12/18. Now with significant positional air leak from tracheotomy    - c/w AC 28/370/10/40%  still unable to tolerate PS trial   - Trached by CT surg 12/18. CT surg to remove trach sutures on 1/1/21  - Pt noted to have large leak. Discussed plan for possible trach upsize with CT surge. CT surge to see patient.       #CV  Hx of DVT on Xarelto  - c/w home metoprolol and amlodipine and monitor BP    - CT negative for PE  - c/w Lovenox 80 BID for prior VTE    #GI  - Continue TF, tolerating well  - Continue bowel regimen  - c/w PPI   - Per Dr. Cruz from CT surge, Due to Ventral Hernia, pt is not a candidate for Percutaneous gastrostomy. Consulted general surgery for possible open gastrostomy tube, deferring for now.  - c/w free water boluses    #Renal  ARTEMIO 2/2 ATN requiring CRRT and HD now recovered  - will trend electrolytes and replete as necessary  - Rivera in place  - strict I’s and O’s  - Repeat BMP this afternoon to monitor hypokalemia after repletion    #ID  Covid 19 pneumonia s/p remdesivir and dexamethason, MSSA bacteremia/pneumonia w/ cavitary consolidation on CT chest and Proteus bacteremia s/p Imipenem (completed 12/28), Stenotrophomonas PNA (s/p Levaquin). Break through fevers last several days with sputum growing Stenotrophomonas again  - WIll restart Levaquin as per ID  - f/u ID    #Heme  Hx DVT on Xarelto  -Anticoagulation with Lovenox 100BID      #Endo  - ISS q6hrs  - NPH q6hrs     Patient is a 60 year old Female with a PMH of HTN, DVT on Xarelto, peritonitis s/p Oral's procedure and ileostomy (reversed 2011), AARON who presented to [facility] on 11/18  w/ fevers found to have COVID-19, intubated 11/23, course complicated by superimposed PNA and bacteremia with Stenotrophomonas (12/11, completed Levaquin x7 days), MSSA/P. mirablis bacteremia (on Cefazolin, potentially due to urine vs line-associated and MSSA in sputum due to PNA), also with lung abscesses on CT chest on 12/5 , ARTEMIO/ATN requiring CRRT and HD. Now unable to wean from ventilator and transferred from [facility] on 12/10 to offload COVID unit. Unable to wean from ventilator, s/p tracheostmy with ctsx 12/18 planned for PEG 12/24 but unable to perform due to large ventral hernia     Transferred to RCU from MICU    Sedation being tapered, as tolerated . spot EEG     AHRF- pt s/p trach by CTS.  Pt with difficulty maintaining volumes - Seen by CTS and taken to OR for trach change.  PS trials ---  Trach very positional - sutures removed     Pt with known hx of DVT - continue on lovenox bid - follow for s/s bleeding held recently for + hemoptysis then resolved     -HTN- meds restarted home metoprolol and amlodipine follow bp and adjust      Dysphagia - unable to   - Per Dr. Cruz from CT surge, Due to Ventral Hernia, pt is not a candidate for Percutaneous gastrostomy. Consulted general surgery for possible open gastrostomy tube, deferring for now.  - c/w free water boluses    #Renal  ARTEMIO 2/2 ATN requiring CRRT and HD now recovered  - will trend electrolytes and replete as necessary  - Rivera in place  - strict I’s and O’s  - Repeat BMP this afternoon to monitor hypokalemia after repletion    #ID  Covid 19 pneumonia s/p remdesivir and dexamethason, MSSA bacteremia/pneumonia w/ cavitary consolidation on CT chest and Proteus bacteremia s/p Imipenem (completed 12/28), Stenotrophomonas PNA (s/p Levaquin). Break through fevers last several days with sputum growing Stenotrophomonas again  - WIll restart Levaquin as per ID- organism resistant to levaquin - ceftazidime sensative and changed to 7 day course   - f/u ID    DM   accuchecks <100 and NPH on hold for now   monior accuchecks     Pt seen and cleared for discharge by          January , 2021  Dispo:    60y F PMHx HTN, Multiple PEs/DVT on Xarelto, Peritonitis s/p Oral's Procedure and Ileostomy (reversed in 2011) and OHS on BIPAP 16/9 QHS who presented to [facility] on 11/18 for AHRF second to COVID 19, intubated on 11/23 complicated by R lung abscesses on CT CHEST 12/5, multibacterial PNA, ARTEMIO s/p CRRT and HD, and s/p Tracheostomy 12/18 c/b track leak and s/p OR 8Bivona. Transferred to RCU for furhter management. While in RCU course c/b  pseudomonas and stenotrophomas s/p TED/fortaz. Also found to have c.diff on 2/1 treated with prolonged course of vanc. Decannulated 3/1 and PEG removed 3/2. Pt transferred to medicine floor 3/4.     Hospital course:  Pt admitted from [facility] for ARDS 2/2 COVID vs multi-bacterial PNA/ R lung abscess. Pt was intubated on 11/23, had tracheostomy on 12/18 by CTSx (sutures removed 1/1) and was decannulated on 3/1. Course c/b lung abscess and bacteremia. Sputum culture showed MSSA and bcx 11/27 showed MSSA and proteus. Sputum culture on 12/1, 12/12, and 12/27 then showed Stenotrophomonas. Sputum cx on 1/9 showed  Pseudomonas and Stenotrophomonas. Bcx has been persistently negative from 12/1, and most recently 1/12. CT CHEST 12/4 with right lung cavitation. Pt finished multiple antibiotics, but now completed Fortaz and Flagyl (1/12-1/16). RPT CT CHEST 1/28 with improvement of right lung abscess. Course c/b ARTEMIO requiring CRRT and HD. ARTEMIO now resolved and no longer required HD. Course c/b AMS and anxiety which has now resolved. CTH and EEG at that time now WNL. Klonopin started for anxiety. Course c/b dysphagia, had PEG placed 1/11 with IR then removed by GI on 3/2. Pt passed speech and swallow on 3/1, on Mechanical soft w thin liquids now. Course further c/b Cdiff. Stool O&P and GI PCR NGTD. Pt finished PO Vancomycin 2/1-2/25. Diarrhea now resolved and forming formed stools. GI was following and recommended Lomotil PRN     **Incomplete   60y F PMHx HTN, Multiple PEs/DVT on Xarelto, Peritonitis s/p Oral's Procedure and Ileostomy (reversed in 2011) and OHS on BIPAP 16/9 QHS who presented to [facility] on 11/18 for AHRF second to COVID 19, intubated on 11/23 complicated by R lung abscesses on CT CHEST 12/5, multibacterial PNA, ARTEMIO s/p CRRT and HD, and s/p Tracheostomy 12/18 c/b track leak and s/p OR 8Bivona. Transferred to RCU for furhter management. While in RCU course c/b  pseudomonas and stenotrophomas s/p TED/fortaz. Also found to have c.diff on 2/1 treated with prolonged course of vanc. Decannulated 3/1 and PEG removed 3/2. Pt transferred to medicine floor 3/4.     Hospital course:  Pt admitted from [facility] for ARDS 2/2 COVID vs multi-bacterial PNA/ R lung abscess. Pt was intubated on 11/23, had tracheostomy on 12/18 by CTSx (sutures removed 1/1) and was decannulated on 3/1. Course c/b lung abscess and bacteremia. Sputum culture showed MSSA and bcx 11/27 showed MSSA and proteus. Sputum culture on 12/1, 12/12, and 12/27 then showed Stenotrophomonas. Sputum cx on 1/9 showed  Pseudomonas and Stenotrophomonas. Bcx has been persistently negative from 12/1, and most recently 1/12. CT CHEST 12/4 with right lung cavitation. Pt finished multiple antibiotics, but now completed Fortaz and Flagyl (1/12-1/16). RPT CT CHEST 1/28 with improvement of right lung abscess. Course c/b ARTEMIO requiring CRRT and HD. ARTEMIO now resolved and no longer required HD. Course c/b AMS and anxiety which has now resolved. CTH and EEG at that time now WNL. Klonopin started for anxiety. Course c/b dysphagia, had PEG placed 1/11 with IR then removed by GI on 3/2. Pt passed speech and swallow on 3/1, on Mechanical soft w thin liquids now. Course further c/b Cdiff. Stool O&P and GI PCR NGTD. Pt finished PO Vancomycin 2/1-2/25. Diarrhea now resolved and forming formed stools. GI was following and recommended Lomotil PRN   Pt is currently on Room air.  Case discussed with attending, Pt is stable for discharge to Rehab

## 2020-12-30 NOTE — DISCHARGE NOTE PROVIDER - DETAILS OF MALNUTRITION DIAGNOSIS/DIAGNOSES
This patient has been assessed with a concern for Malnutrition and was treated during this hospitalization for the following Nutrition diagnosis/diagnoses:     -  02/16/2021: Severe protein-calorie malnutrition   -  02/16/2021: Morbid obesity (BMI > 40)   This patient has been assessed with a concern for Malnutrition and was treated during this hospitalization for the following Nutrition diagnosis/diagnoses:     -  02/16/2021: Severe protein-calorie malnutrition   -  02/16/2021: Morbid obesity (BMI > 40)    This patient has been assessed with a concern for Malnutrition and was treated during this hospitalization for the following Nutrition diagnosis/diagnoses:     -  02/16/2021: Severe protein-calorie malnutrition   -  02/16/2021: Morbid obesity (BMI > 40)   This patient has been assessed with a concern for Malnutrition and was treated during this hospitalization for the following Nutrition diagnosis/diagnoses:     -  02/16/2021: Severe protein-calorie malnutrition   -  02/16/2021: Morbid obesity (BMI > 40)    This patient has been assessed with a concern for Malnutrition and was treated during this hospitalization for the following Nutrition diagnosis/diagnoses:     -  02/16/2021: Severe protein-calorie malnutrition   -  02/16/2021: Morbid obesity (BMI > 40)    This patient has been assessed with a concern for Malnutrition and was treated during this hospitalization for the following Nutrition diagnosis/diagnoses:     -  02/16/2021: Severe protein-calorie malnutrition   -  02/16/2021: Morbid obesity (BMI > 40)

## 2020-12-30 NOTE — ADVANCED PRACTICE NURSE CONSULT - ASSESSMENT
General:  A & O x 0, tracheostomy in place ventilator dependent on AC with 40% Fio2; peritubular skin of PEG intact at risk with moisture associated dermatitis due to intertriginous folds of neck. Left nare with tiger tube in place receiving enteral feeds, peritubular skin intact. Right cheek hypopigmentation. Indwelling urethral catheter in place and bowel management system in place. OGT in place. Skin warm, dry with increased moisture in intertriginous folds, adequate skin turgor. Blanchable erythema on bilateral heels, right lateral distal foot with hyperpigmentation.     Risk for moisture associated dermatitis of intertriginous folds: bilateral breast, pannus and bilateral groin with hyperpigmentation secondary to moisture exposure- no suspicion of candidiasis, skin intact, no erythema noted.    Chin extending to right mandible, Unstageable Pressure Injury entire area measures [id number],  100% black-hard dry firmly attached stable eschar. Induration beneath site of injury that does not extend beyond wound borders, no increased warmth, no erythema in periwound skin (no signs of soft tissue infection). No drainage noted. Goals of care: monitor for changes in tissue type, maintain stable eschar at this time (no signs of soft tissue infection and patient critically ill, skin may not heal at this time). Wound stable as per previous assessment.    Left cheek, Unstageable Pressure Injury measures 0.5cmx1.1grx0ny. 100% hard, stable eschar that is firmly attached to wound base and wound edges. Periwound skin circumferential hypopigmentation, no induration, no increased warmth, no erythema. No active drainage noted. Goal of care: maintain stable eschar at this time. Wound stable as per previous assessment   General:  A & O x 0, tracheostomy in place ventilator dependent on AC with 40% Fio2; peritubular skin of PEG intact at risk with moisture associated dermatitis due to intertriginous folds of neck. Left nare with tiger tube in place receiving enteral feeds, peritubular skin intact. Right cheek hypopigmentation. Indwelling urethral catheter in place and bowel management system in place. OGT in place. Skin warm, dry with increased moisture in intertriginous folds, adequate skin turgor. Blanchable erythema on bilateral heels, right lateral distal foot with hyperpigmentation.     Risk for moisture associated dermatitis of intertriginous folds: bilateral breast, pannus and bilateral groin with hyperpigmentation secondary to moisture exposure- no suspicion of candidiasis, skin intact, no erythema noted.    Chin extending to right mandible, Unstageable Pressure Injury entire area measures [id number],  100% black-hard dry firmly attached stable eschar. Induration beneath site of injury that does not extend beyond wound borders, no increased warmth, no erythema in periwound skin (no signs of soft tissue infection). No drainage noted. Goals of care: monitor for changes in tissue type, maintain stable eschar at this time (no signs of soft tissue infection and patient critically ill, skin may not heal at this time). Wound stable as per previous assessment.    Left cheek, Unstageable Pressure Injury measures 0.5cmx1.8yml7yd. 100% hard, stable eschar that is firmly attached to wound base and wound edges. Periwound skin circumferential hypopigmentation, no induration, no increased warmth, no erythema. No active drainage noted. Goal of care: maintain stable eschar at this time. Wound stable as per previous assessment    Sacral fold to bilateral buttock (predominately on left buttock): mixed etiology acute skin changes related to COVID-19/acute skin changes in the critically ill patient, Unstageable pressure injury originally complicated by fecal incontinence (liquid stool currently contained with BMS) now additionally complicated by moisture and friction. Patient turned to right side during assessment using Hover Mat. When patient is in natural anatomical position part of the injury is not visible. There is a linear like ulceration overlying the left buttock with irregular borders, this area measures 14cmx1.5cmx0.2cm, 30% soft, brown/tan moist firmly attached eschar, 20% pink, moist dermis, 60% re-epithelialization migrating from wound edges. Immediate periwound skin is intact with chronic hyperpigmentation no palpable skin changes.  Sacral fold with chronic hyperpigmentation and scattered areas of denuded epidermis to bilateral upper inner buttocks "kissing" one another evidence injury is related ot increased moisture. Additionally there is intact hypopigmentation and hyperpigmentation left gluteal fold. All areas are without erythema edema, increased warmth or induration. Scant serous drainage noted from exposed dermis. Goal of care: hydrophilic protection from moisture and incontinence, monitor for changes in tissue type of eschar on left buttock, continue to offload pressure.

## 2020-12-31 ENCOUNTER — APPOINTMENT (OUTPATIENT)
Dept: THORACIC SURGERY | Facility: HOSPITAL | Age: 60
End: 2020-12-31

## 2020-12-31 LAB
ALBUMIN SERPL ELPH-MCNC: 2.4 G/DL — LOW (ref 3.3–5)
ALP SERPL-CCNC: 96 U/L — SIGNIFICANT CHANGE UP (ref 40–120)
ALT FLD-CCNC: 10 U/L — SIGNIFICANT CHANGE UP (ref 4–33)
ANION GAP SERPL CALC-SCNC: 8 MMOL/L — SIGNIFICANT CHANGE UP (ref 7–14)
APTT BLD: 19.8 SEC — LOW (ref 27–36.3)
AST SERPL-CCNC: 27 U/L — SIGNIFICANT CHANGE UP (ref 4–32)
BILIRUB SERPL-MCNC: 0.4 MG/DL — SIGNIFICANT CHANGE UP (ref 0.2–1.2)
BLD GP AB SCN SERPL QL: NEGATIVE — SIGNIFICANT CHANGE UP
BUN SERPL-MCNC: 15 MG/DL — SIGNIFICANT CHANGE UP (ref 7–23)
CALCIUM SERPL-MCNC: 8.5 MG/DL — SIGNIFICANT CHANGE UP (ref 8.4–10.5)
CHLORIDE SERPL-SCNC: 106 MMOL/L — SIGNIFICANT CHANGE UP (ref 98–107)
CO2 SERPL-SCNC: 32 MMOL/L — HIGH (ref 22–31)
CREAT SERPL-MCNC: 0.66 MG/DL — SIGNIFICANT CHANGE UP (ref 0.5–1.3)
GLUCOSE BLDC GLUCOMTR-MCNC: 102 MG/DL — HIGH (ref 70–99)
GLUCOSE BLDC GLUCOMTR-MCNC: 104 MG/DL — HIGH (ref 70–99)
GLUCOSE BLDC GLUCOMTR-MCNC: 107 MG/DL — HIGH (ref 70–99)
GLUCOSE BLDC GLUCOMTR-MCNC: 107 MG/DL — HIGH (ref 70–99)
GLUCOSE SERPL-MCNC: 103 MG/DL — HIGH (ref 70–99)
HCT VFR BLD CALC: 29 % — LOW (ref 34.5–45)
HGB BLD-MCNC: 7.9 G/DL — LOW (ref 11.5–15.5)
INR BLD: 1.18 RATIO — HIGH (ref 0.88–1.16)
MAGNESIUM SERPL-MCNC: 1.7 MG/DL — SIGNIFICANT CHANGE UP (ref 1.6–2.6)
MCHC RBC-ENTMCNC: 27.2 GM/DL — LOW (ref 32–36)
MCHC RBC-ENTMCNC: 29 PG — SIGNIFICANT CHANGE UP (ref 27–34)
MCV RBC AUTO: 106.6 FL — HIGH (ref 80–100)
NRBC # BLD: 0 /100 WBCS — SIGNIFICANT CHANGE UP
NRBC # FLD: 0.04 K/UL — HIGH
PLATELET # BLD AUTO: 320 K/UL — SIGNIFICANT CHANGE UP (ref 150–400)
POTASSIUM SERPL-MCNC: 4.4 MMOL/L — SIGNIFICANT CHANGE UP (ref 3.5–5.3)
POTASSIUM SERPL-SCNC: 4.4 MMOL/L — SIGNIFICANT CHANGE UP (ref 3.5–5.3)
PROT SERPL-MCNC: 6.9 G/DL — SIGNIFICANT CHANGE UP (ref 6–8.3)
PROTHROM AB SERPL-ACNC: 13.4 SEC — SIGNIFICANT CHANGE UP (ref 10.6–13.6)
RBC # BLD: 2.72 M/UL — LOW (ref 3.8–5.2)
RBC # FLD: 19.8 % — HIGH (ref 10.3–14.5)
RH IG SCN BLD-IMP: POSITIVE — SIGNIFICANT CHANGE UP
SODIUM SERPL-SCNC: 146 MMOL/L — HIGH (ref 135–145)
WBC # BLD: 16.92 K/UL — HIGH (ref 3.8–10.5)
WBC # FLD AUTO: 16.92 K/UL — HIGH (ref 3.8–10.5)

## 2020-12-31 PROCEDURE — 99232 SBSQ HOSP IP/OBS MODERATE 35: CPT

## 2020-12-31 PROCEDURE — 31624 DX BRONCHOSCOPE/LAVAGE: CPT | Mod: 59

## 2020-12-31 PROCEDURE — 31645 BRNCHSC W/THER ASPIR 1ST: CPT | Mod: 59

## 2020-12-31 PROCEDURE — 31502 CHANGE OF WINDPIPE AIRWAY: CPT

## 2020-12-31 PROCEDURE — 99233 SBSQ HOSP IP/OBS HIGH 50: CPT

## 2020-12-31 PROCEDURE — 31615 TRCHEOBRNCHSC EST TRACHS INC: CPT

## 2020-12-31 RX ORDER — METHADONE HYDROCHLORIDE 40 MG/1
5 TABLET ORAL EVERY 8 HOURS
Refills: 0 | Status: DISCONTINUED | OUTPATIENT
Start: 2020-12-31 | End: 2021-01-03

## 2020-12-31 RX ORDER — CHLORHEXIDINE GLUCONATE 213 G/1000ML
1 SOLUTION TOPICAL DAILY
Refills: 0 | Status: DISCONTINUED | OUTPATIENT
Start: 2020-12-31 | End: 2021-03-19

## 2020-12-31 RX ORDER — DIAZEPAM 5 MG
5 TABLET ORAL THREE TIMES A DAY
Refills: 0 | Status: DISCONTINUED | OUTPATIENT
Start: 2020-12-31 | End: 2021-01-03

## 2020-12-31 RX ADMIN — AMLODIPINE BESYLATE 5 MILLIGRAM(S): 2.5 TABLET ORAL at 06:23

## 2020-12-31 RX ADMIN — Medication 650 MILLIGRAM(S): at 03:43

## 2020-12-31 RX ADMIN — CEFTAZIDIME 100 GRAM(S): 6 INJECTION, POWDER, FOR SOLUTION INTRAVENOUS at 02:35

## 2020-12-31 RX ADMIN — PANTOPRAZOLE SODIUM 40 MILLIGRAM(S): 20 TABLET, DELAYED RELEASE ORAL at 11:06

## 2020-12-31 RX ADMIN — METHADONE HYDROCHLORIDE 5 MILLIGRAM(S): 40 TABLET ORAL at 22:08

## 2020-12-31 RX ADMIN — Medication 50 MILLIGRAM(S): at 06:23

## 2020-12-31 RX ADMIN — CEFTAZIDIME 100 GRAM(S): 6 INJECTION, POWDER, FOR SOLUTION INTRAVENOUS at 10:56

## 2020-12-31 RX ADMIN — Medication 650 MILLIGRAM(S): at 04:43

## 2020-12-31 RX ADMIN — METHADONE HYDROCHLORIDE 5 MILLIGRAM(S): 40 TABLET ORAL at 06:22

## 2020-12-31 RX ADMIN — Medication 5 MILLIGRAM(S): at 06:22

## 2020-12-31 RX ADMIN — CHLORHEXIDINE GLUCONATE 15 MILLILITER(S): 213 SOLUTION TOPICAL at 06:23

## 2020-12-31 RX ADMIN — CEFTAZIDIME 100 GRAM(S): 6 INJECTION, POWDER, FOR SOLUTION INTRAVENOUS at 17:00

## 2020-12-31 RX ADMIN — CHLORHEXIDINE GLUCONATE 15 MILLILITER(S): 213 SOLUTION TOPICAL at 17:00

## 2020-12-31 RX ADMIN — CHLORHEXIDINE GLUCONATE 1 APPLICATION(S): 213 SOLUTION TOPICAL at 11:06

## 2020-12-31 RX ADMIN — Medication 5 MILLIGRAM(S): at 22:08

## 2020-12-31 NOTE — PROGRESS NOTE ADULT - ASSESSMENT
This is a 60 year old Female with a PMH of HTN, DVT on Xarelto, peritonitis s/p Oral's procedure and ileostomy (reversed 2011), AARON who presented to [facility] on 11/18  w/ fevers found to have COVID-19, intubated 11/23, course complicated by superimposed PNA and bacteremia with Stenotrophomonas (12/11, completed Levaquin x7 days), MSSA/P. mirablis bacteremia (on Cefazolin, potentially due to urine vs line-associated and MSSA in sputum due to PNA), also with lung abscesses on CT chest on 12/5 , ARTEMIO/ATN requiring CRRT and HD. Now unable to wean from ventilator and transferred from [facility] on 12/10 to offload COVID unit. Unable to wean from ventilator, s/p tracheostmy with ctsx 12/18 planned for PEG 12/24 but unable to perform due to large ventral hernia     # AMS  - Alert & oriented x3 @baseline.   - Pt is able to opens eyes to voice but does not follow any commands. No purposeful movements appreciated.   - c/w Methadone and Valium 5mg TID, wean slowly as tolerated  - Spot EEG - abnormal study- Moderate nonspecific diffuse or multifocal cerebral dysfunction   - Will consider MRI     # Acute hypoxemic respiratory failure 2/2 covid-19 PNA / MSSA Bacteremia/Pneumonia,  - s/p intubated 11/23. Course complicated by MSSA pneumonia, stenotrophomonas, p. mirabalis, and lung abscess on CT chest from 12/5.   - Pt has been unable to wean from ventilator, s/p Trached by CTSx on 12/18. Now with significant positional air leak from tracheotomy    - Plan for CTSx to remove trach sutures on 1/1/21  - Pt noted to have large leak. Discussed plan for possible trach upsize with CTSx.   - s/p Imipenem (12/16- 12/28) 500mg q12h x 2 weeks for cavitation and MSSA,  - Will switch Levaquin to Ceftazidime starting today (12/29 - ...) for 7 days course (due to resistance to Levaquin) until 1/5/2021    # Hypernatremia  - Na 155>>157>>152, s/p D5W 50cc/hr for few hours, and Free water [id number]  - Will increase free water to 450 q 4 - and d'shagufta D5W for now  - Will monitor -Na+ 149 today - continue free water      # Hx of DVT (on Xarelto at home)  - s/p heparin gtt in Surg B, but was stopped due to low H/H requiring transfusion of PRBC    - c/w home metoprolol and Amlodipine to better monitor Blood pressure    - CTA negative for PE  - Lovenox restarted give one dose - npo after MN for trach exchange     # ARTEMIO 2/2 ATN   - s/p CRRT, and Bumex and intermittent HD.   - Last HD 12/17, Bumex gtt d/c'ed. Now with good UOP   - c/w Rivera for now, will consider TOV in the near future  - Srict I’s and O’s    # Diet  - Pt is not a candidate for Percutaneous Gastrostomy due to large ventral hernia as per Dr. Cruz  - General surgery to evaluate for Possible Open Gastrostomy tube  - c/w NGT feeding for now, tolerating well  - c/w bowel regimen  - c/w NPH q6hr, and ISS - Monitor FS routinely  - FS accucheck <100 past 24 h -Stop NPH and monitor     #HTN   -amlodipine /metorpolol   - Monitor bp with parameters     #Dispo   - RCU   - PT/OT recs pending   This is a 60 year old Female with a PMH of HTN, DVT on Xarelto, peritonitis s/p Oral's procedure and ileostomy (reversed 2011), AARON who presented to [facility] on 11/18  w/ fevers found to have COVID-19, intubated 11/23, course complicated by superimposed PNA and bacteremia with Stenotrophomonas (12/11, completed Levaquin x7 days), MSSA/P. mirablis bacteremia (on Cefazolin, potentially due to urine vs line-associated and MSSA in sputum due to PNA), also with lung abscesses on CT chest on 12/5 , ARTEMIO/ATN requiring CRRT and HD. Now unable to wean from ventilator and transferred from [facility] on 12/10 to offload COVID unit. Unable to wean from ventilator, s/p tracheostmy with ctsx 12/18 planned for PEG 12/24 but unable to perform due to large ventral hernia     # AMS  - Alert & oriented x3 @baseline.   - Pt is able to opens eyes to voice but does not follow any commands. No purposeful movements appreciated.   - c/w Methadone and Valium 5mg TID, wean slowly as tolerated  - Spot EEG - abnormal study- Moderate nonspecific diffuse or multifocal cerebral dysfunction   - Will consider MRI     # Acute hypoxemic respiratory failure 2/2 covid-19 PNA / MSSA Bacteremia/Pneumonia,  - s/p intubated 11/23. Course complicated by MSSA pneumonia, stenotrophomonas, p. mirabalis, and lung abscess on CT chest from 12/5.   - Pt has been unable to wean from ventilator, s/p Trached by CTSx on 12/18. Now with significant positional air leak from tracheotomy    - Plan for CTSx to remove trach sutures on 1/1/21  - Pt noted to have large leak. Discussed plan for possible trach upsize with CTSx.   - s/p Imipenem (12/16- 12/28) 500mg q12h x 2 weeks for cavitation and MSSA,  - Will switch Levaquin to Ceftazidime starting today (12/29 - ...) for 7 days course (due to resistance to Levaquin) until 1/5/2021  - To OR today with THoracic- and trach change to 8Fr Bivona w/cannula at 13.5cm    # Hypernatremia  - Na 155>>157>>152, s/p D5W 50cc/hr for few hours, and Free water [id number]  - Will increase free water to 450 q 4 - and d'shagufta D5W for now  - Will monitor -Na+ 149 today - continue free water   - Na improving , free water lowered      # Hx of DVT (on Xarelto at home)  - s/p heparin gtt in Surg B, but was stopped due to low H/H requiring transfusion of PRBC    - c/w home metoprolol and Amlodipine to better monitor Blood pressure    - CTA negative for PE  - Lovenox restarted give one dose - npo after MN for trach exchange     # ARTEMIO 2/2 ATN   - s/p CRRT, and Bumex and intermittent HD.   - Last HD 12/17, Bumex gtt d/c'ed. Now with good UOP   - c/w Rivera for now, will consider TOV in the near future  - Srict I’s and O’s    # Diet  - Pt is not a candidate for Percutaneous Gastrostomy due to large ventral hernia as per Dr. Cruz  - General surgery to evaluate for Possible Open Gastrostomy tube  - c/w NGT feeding for now, tolerating well  - c/w bowel regimen  - c/w NPH q6hr, and ISS - Monitor FS routinely  - FS accucheck <100 past 24 h -Stop NPH and monitor     #HTN   -amlodipine /metorpolol   - Monitor bp with parameters     #Dispo   - RCU   - PT/OT recs pending

## 2020-12-31 NOTE — PROGRESS NOTE ADULT - ATTENDING COMMENTS
Agree with plan as outlined above. Patient seen and examined at bedside. Patient history, laboratory data, and imaging personally reviewed.    Pt is a 60F with PMHX as above presenting to [facility] for hypoxemic respiratory failure with ARDS 2/2 COVID19 PNA further c/b superimposed MSSA cavitary PNA and ARF requiring HD. Pt with failure to wean from mechanical ventilation, s/p tracheostomy placement 12/18 and transfer to RCU 12/28.     Pt neurologically remains with minimal consciousness, opens eyes slightly but has no purposeful movements. Remains off all IV sedation since 12/24, is on small dose of methadone+diazepam for vent synchrony. CT Head (-) 12/12.  EEG (-). Will consider possible MRI once more stable.    Pt with trach placement (CTSx, 12/18 Proximal 7XLT), now with ventilator dependence and difficulty weaning. Has intermittent air leak, worse over the past couple of days. Bedside bronchoscopy significant for dynamic airway collapse distal to trach but with good trach position. ABGs wnl, pt remains hemodynamically stable with no evidence of respiratory distress. Discussed with thoracic sx team, may need to evaluate in OR this afternoon. Trach is very positional, air leak improves with tightening of sutures, placing pt supine, and changing to pressure control ventilation. Sutures in place until 1/1. Will c/w SBTs as tolerated. Airway clearance therapy and trach care as per RCU team. Pt with bleeding from trach site this week, likely 2/2 excessive suctioning, now improved. Lovenox held 2/2 OR. CBCs stable.     Pt with MSSA bacteremia with cavitary PNA s/p completion of Abx with Primaxin on 12/28. With recurrent fevers and leukocytosis, growing resistant Stenotrophomonas in sputum. Levofloxacin switched to Ceftazidime 12/29, plan for 7 day course through 1/5. If pt continues to spike fevers despite change in Abx regimen, may need repeat CT Chest. Tolerating feeds via NGT, pt unable to get PEG given large ventral hernia. Poor candidate for surgical open PEG placement at this time. Bowel regimen in place. GI ppx with PPI. Pt also initially with ARF on CRRT, following by intermittent HD (last session 12/17) with renal recovery. Hypernatremia improving, will c/w free H20 via NGT. NPH+HISS for glucose control. Wound consult recs appreciated, pt will need Plastic Sx consult once more stable.

## 2020-12-31 NOTE — PROGRESS NOTE ADULT - SUBJECTIVE AND OBJECTIVE BOX
CHIEF COMPLAINT: Patient is a 60y old  Female who presents with a chief complaint of Transfer from [facility] (30 Dec 2020 08:43)      Interval Events: Pt seen and evaluated at bedside with team.  Scheduled for trach change with CTS in OR     REVIEW OF SYSTEMS:  [x ] Unable to assess ROS because AMS     OBJECTIVE:  ICU Vital Signs Last 24 Hrs  T(C): 38.5 (31 Dec 2020 04:44), Max: 38.8 (30 Dec 2020 12:22)  T(F): 101.3 (31 Dec 2020 04:44), Max: 101.8 (30 Dec 2020 12:22)  HR: 101 (31 Dec 2020 04:44) (87 - 107)  BP: 140/55 (31 Dec 2020 04:44) (138/58 - 144/62)  BP(mean): --  ABP: --  ABP(mean): --  RR: 13 (31 Dec 2020 04:44) (13 - 16)  SpO2: 97% (31 Dec 2020 04:44) (94% - 100%)    Mode: AC/ CMV (Assist Control/ Continuous Mandatory Ventilation), RR (machine): 12, TV (machine): 370, FiO2: 40, PEEP: 5, ITime: 2.6, MAP: 29, PC: 30, PIP: 44     @ : @ 07:00  --------------------------------------------------------  IN: 3770 mL / OUT: 1450 mL / NET: 2320 mL     @ 07:  -   @ 06:45  --------------------------------------------------------  IN: 0 mL / OUT: 425 mL / NET: -425 mL      CAPILLARY BLOOD GLUCOSE      POCT Blood Glucose.: 102 mg/dL (31 Dec 2020 05:51)        HOSPITAL MEDICATIONS:  MEDICATIONS  (STANDING):  amLODIPine   Tablet 5 milliGRAM(s) Oral daily  cefTAZidime  IVPB 2 Gram(s) IV Intermittent every 8 hours  chlorhexidine 0.12% Liquid 15 milliLiter(s) Oral Mucosa every 12 hours  diazepam    Tablet 5 milliGRAM(s) Oral three times a day  insulin lispro (ADMELOG) corrective regimen sliding scale   SubCutaneous every 6 hours  methadone   Solution 5 milliGRAM(s) Oral every 8 hours  metoprolol tartrate 50 milliGRAM(s) Oral two times a day  pantoprazole  Injectable 40 milliGRAM(s) IV Push daily  potassium chloride  10 mEq/100 mL IVPB 10 milliEquivalent(s) IV Intermittent every 1 hour    MEDICATIONS  (PRN):  acetaminophen    Suspension .. 650 milliGRAM(s) Oral every 6 hours PRN Temp greater or equal to 38.5C (101.3F)      LABS:                        8.0    13.90 )-----------( 314      ( 30 Dec 2020 12:15 )             29.2     12-31    146<H>  |  106  |  x   ----------------------------<  x   4.4   |  x   |  x     Ca    8.5      30 Dec 2020 12:15  Phos  3.6     12-30  Mg     1.7     -    TPro  3.8<L>  /  Alb  1.4<L>  /  TBili  0.5  /  DBili  x   /  AST  19  /  ALT  7   /  AlkPhos  50  12-      Urinalysis Basic - ( 30 Dec 2020 16:24 )    Color: Yellow / Appearance: Clear / S.016 / pH: x  Gluc: x / Ketone: Negative  / Bili: Negative / Urobili: 3 mg/dL   Blood: x / Protein: 30 mg/dL / Nitrite: Negative   Leuk Esterase: Negative / RBC: 5 /HPF / WBC 13 /HPF   Sq Epi: x / Non Sq Epi: 2 /HPF / Bacteria: Negative            MICROBIOLOGY:     RADIOLOGY:  [ ] Reviewed and interpreted by me    PULMONARY FUNCTION TESTS:    EKG:

## 2020-12-31 NOTE — BRIEF OPERATIVE NOTE - NSICDXBRIEFPREOP_GEN_ALL_CORE_FT
PRE-OP DIAGNOSIS:  Failure respiratory 18-Dec-2020 16:05:44  Olu Elaine  
PRE-OP DIAGNOSIS:  Failure respiratory 18-Dec-2020 16:05:44  Olu Elaine

## 2020-12-31 NOTE — BRIEF OPERATIVE NOTE - NSICDXBRIEFPOSTOP_GEN_ALL_CORE_FT
POST-OP DIAGNOSIS:  Failure respiratory 18-Dec-2020 16:05:51  Olu Elaine  
POST-OP DIAGNOSIS:  Failure respiratory 18-Dec-2020 16:05:51  Olu Elaine

## 2020-12-31 NOTE — PROGRESS NOTE ADULT - SUBJECTIVE AND OBJECTIVE BOX
Pt is in no distress post-op.  On vent  VSS  Trach in place; no bleeding; no sound of air leakage; no bleeding  Pt is maintaining O2 sat and TV  A: stable s/p Bronchoscopy, with tracheostomy tube replacement  P: as per primary team yes

## 2020-12-31 NOTE — PROGRESS NOTE ADULT - SUBJECTIVE AND OBJECTIVE BOX
Follow Up:      Inverval History/ROS:Patient is a 60y old  Female who presents with a chief complaint of Transfer from [facility] (22 Dec 2020 08:13)     going to OR for trach change with CT surgery.    trach secretions remain blood tinged.  sputum with Stenotrophomonas.         Allergies    Kiwi (Unknown)  latex (Anaphylaxis)  latex (Unknown)  penicillin (Other)  penicillin (Unknown)  perfume  hives (Other)  potassium acetate (Other)  soap additives hives (Other)    Intolerances        ANTIMICROBIALS:  cefTAZidime  IVPB 2 every 8 hours    MEDICATIONS  (STANDING):  acetaminophen    Suspension .. 650 every 6 hours PRN  amLODIPine   Tablet 5 daily  diazepam    Tablet 5 three times a day  insulin lispro (ADMELOG) corrective regimen sliding scale  every 6 hours  methadone   Solution 5 every 8 hours  metoprolol tartrate 50 two times a day  pantoprazole  Injectable 40 daily      Vital Signs Last 24 Hrs  T(F): 100.4 (12-31-20 @ 10:45), Max: 101.3 (12-30-20 @ 14:38)  HR: 92 (12-31-20 @ 12:18)  BP: 125/65 (12-31-20 @ 10:45)  RR: 13 (12-31-20 @ 10:45)  SpO2: 99% (12-31-20 @ 12:18) (96% - 100%)    PHYSICAL EXAM:  General: [x ] trach, lethargic, on cooling blanket  HEAD/EYES: [ ] PERRL [x ] white sclera [ ] icterus  ENT:  eschar chin, trach, NGT  Cardiovascular:   distant  Respiratory:  [ x] distant  GI:  [x ] soft, scars, hernia  :  [x ] negro [] no CVA tenderness   Musculoskeletal:  [ ] no synovitis  Neurologic:  unable   Skin:  x[ ] no rash  Psychiatric:  unable  Lines:  [ x] no phlebitis bilat arrow                          7.9    16.92 )-----------( 320      ( 31 Dec 2020 06:16 )             29.0 12-31    146  |  106  |  15  ----------------------------<  103  4.4   |  32  |  0.66  Ca    8.5      31 Dec 2020 06:16Phos  3.6     12-30Mg     1.7     12-31  TPro  6.9  /  Alb  2.4  /  TBili  0.4  /  DBili  x   /  AST  27  /  ALT  10  /  AlkPhos  96  12-31          MICROBIOLOGY:  uCulture - Blood (12.27.20 @ 10:22)   Specimen Source: .Blood Blood   Culture Results:   No growth to date. Culture - Blood (12.27.20 @ 10:22)   Specimen Source: .Blood Blood   Culture Results:   No growth to date.   culCulture - Blood (12.21.20 @ 23:09)   Specimen Source: .Blood Blood-Venous   Culture Results:   No growth  Culture - Blood (12.21.20 @ 23:09)   Specimen Source: .Blood Blood-Peripheral   Culture Results:   No growth   RADIOLOGY:    rd< from: Xray Chest 1 View-PORTABLE IMMEDIATE (Xray Chest 1 View-PORTABLE IMMEDIATE .) (12.19.20 @ 20:25) >    EXAM:  XR CHEST PORTABLE IMMED 1V        PROCEDURE DATE:  Dec 19 2020         INTERPRETATION:  INDICATION: COVID. Nasogastric tube.    TECHNIQUE: 2 portable views of the chest.    COMPARISON: 12/18/2020    FINDINGS: There is a tracheostomy tube in place. There is a nasogastric tube with its tip below the level of this film. There is a right IJ catheter with tip overlying the superior vena cava. There are bilateral diffuse airspace opacities.    IMPRESSION: Nasogastric tube tip is below the level of these films. Bilateral diffuse airspace opacities.    < end of copied text >

## 2020-12-31 NOTE — PROGRESS NOTE ADULT - ASSESSMENT
61 yo woman with AARON, h/o Lockett's procedure, s/p reversal 2011 who was admitted to [facility] 11/17 with covid pneumonia.  She received course of remdesevir and dexamethasone; required intubation 11/23 for acute hypoxemic respiratory failure. Hospital course complicated by Proteus bacteremia, MSSA bacteremia/ pneumonia, and Stenotrophomonas respiratory infection, as well as ATN requiring CVVHD --> HD ---. off dialysis.  CT chest 12/4 new large consolidation with cavitation in right lung.  Patient transferred [facility] 12/10; spiked high fever; antibiotics broadened to vanco x 1 and meropenem 500 mg iv 24h.  Blood culture 11/27 MSSA and proteus; 11/28 MSSA.  Bacteremia cleared.   Blood cultures 12/1, 12/11, 12/21, 12/27 no growth.  Sputum culture 12/1, 12/12, 12/27 Stenotrophomonas.    Multifocal covid pneumonia with respiratory failure, MSSA bacteremia 11/27 with cavitary pneumonia, Proteus bacteremia 11/27, Stenotrophomonas pneumonia, ATN.  CxR 12/16- large right lung opacity.  aspiration pneumonia superimposed on ARDS due to covid.   s/p trach 12/18.  12/29 Fever, blood tinged sputum and Stenotrophomonas in sputum.  meropenem changed to ceftazidime.   12/31 trach leak.  for OR.     suggest:  c/w ceftazidime --> 1/4  follow cultures  trend temp, wbc, creat    ID service will follow over weekend.      Mila Burgos MD  Pager: [phone]  After 5 PM or weekends please call fellow on call or office [phone]          ID service available for questions over weekend.    Mila Burgos MD  Pager: [phone]  After 5 PM or weekends please call fellow on call or office [phone]

## 2021-01-01 LAB
ALBUMIN SERPL ELPH-MCNC: 2.3 G/DL — LOW (ref 3.3–5)
ALP SERPL-CCNC: 81 U/L — SIGNIFICANT CHANGE UP (ref 40–120)
ALT FLD-CCNC: 8 U/L — SIGNIFICANT CHANGE UP (ref 4–33)
ANION GAP SERPL CALC-SCNC: 12 MMOL/L — SIGNIFICANT CHANGE UP (ref 7–14)
AST SERPL-CCNC: 21 U/L — SIGNIFICANT CHANGE UP (ref 4–32)
BILIRUB SERPL-MCNC: 0.5 MG/DL — SIGNIFICANT CHANGE UP (ref 0.2–1.2)
BUN SERPL-MCNC: 15 MG/DL — SIGNIFICANT CHANGE UP (ref 7–23)
CALCIUM SERPL-MCNC: 8.4 MG/DL — SIGNIFICANT CHANGE UP (ref 8.4–10.5)
CHLORIDE SERPL-SCNC: 103 MMOL/L — SIGNIFICANT CHANGE UP (ref 98–107)
CO2 SERPL-SCNC: 31 MMOL/L — SIGNIFICANT CHANGE UP (ref 22–31)
CREAT SERPL-MCNC: 0.54 MG/DL — SIGNIFICANT CHANGE UP (ref 0.5–1.3)
CULTURE RESULTS: SIGNIFICANT CHANGE UP
GLUCOSE BLDC GLUCOMTR-MCNC: 109 MG/DL — HIGH (ref 70–99)
GLUCOSE BLDC GLUCOMTR-MCNC: 114 MG/DL — HIGH (ref 70–99)
GLUCOSE BLDC GLUCOMTR-MCNC: 120 MG/DL — HIGH (ref 70–99)
GLUCOSE BLDC GLUCOMTR-MCNC: 90 MG/DL — SIGNIFICANT CHANGE UP (ref 70–99)
GLUCOSE SERPL-MCNC: 83 MG/DL — SIGNIFICANT CHANGE UP (ref 70–99)
HCT VFR BLD CALC: 26 % — LOW (ref 34.5–45)
HGB BLD-MCNC: 7.2 G/DL — LOW (ref 11.5–15.5)
MAGNESIUM SERPL-MCNC: 1.5 MG/DL — LOW (ref 1.6–2.6)
MCHC RBC-ENTMCNC: 27.7 GM/DL — LOW (ref 32–36)
MCHC RBC-ENTMCNC: 28.8 PG — SIGNIFICANT CHANGE UP (ref 27–34)
MCV RBC AUTO: 104 FL — HIGH (ref 80–100)
NRBC # BLD: 0 /100 WBCS — SIGNIFICANT CHANGE UP
NRBC # FLD: 0 K/UL — SIGNIFICANT CHANGE UP
PLATELET # BLD AUTO: 238 K/UL — SIGNIFICANT CHANGE UP (ref 150–400)
POTASSIUM SERPL-MCNC: 3.9 MMOL/L — SIGNIFICANT CHANGE UP (ref 3.5–5.3)
POTASSIUM SERPL-SCNC: 3.9 MMOL/L — SIGNIFICANT CHANGE UP (ref 3.5–5.3)
PROT SERPL-MCNC: 6.5 G/DL — SIGNIFICANT CHANGE UP (ref 6–8.3)
RBC # BLD: 2.5 M/UL — LOW (ref 3.8–5.2)
RBC # FLD: 19.5 % — HIGH (ref 10.3–14.5)
SODIUM SERPL-SCNC: 146 MMOL/L — HIGH (ref 135–145)
SPECIMEN SOURCE: SIGNIFICANT CHANGE UP
WBC # BLD: 13.86 K/UL — HIGH (ref 3.8–10.5)
WBC # FLD AUTO: 13.86 K/UL — HIGH (ref 3.8–10.5)

## 2021-01-01 PROCEDURE — 99232 SBSQ HOSP IP/OBS MODERATE 35: CPT | Mod: GC

## 2021-01-01 PROCEDURE — 71045 X-RAY EXAM CHEST 1 VIEW: CPT | Mod: 26,59

## 2021-01-01 PROCEDURE — 99233 SBSQ HOSP IP/OBS HIGH 50: CPT | Mod: GC

## 2021-01-01 RX ORDER — ACETAMINOPHEN 500 MG
650 TABLET ORAL EVERY 6 HOURS
Refills: 0 | Status: DISCONTINUED | OUTPATIENT
Start: 2021-01-01 | End: 2021-01-18

## 2021-01-01 RX ORDER — MAGNESIUM SULFATE 500 MG/ML
2 VIAL (ML) INJECTION ONCE
Refills: 0 | Status: COMPLETED | OUTPATIENT
Start: 2021-01-01 | End: 2021-01-01

## 2021-01-01 RX ADMIN — Medication 650 MILLIGRAM(S): at 22:00

## 2021-01-01 RX ADMIN — Medication 5 MILLIGRAM(S): at 13:21

## 2021-01-01 RX ADMIN — Medication 50 MILLIGRAM(S): at 17:29

## 2021-01-01 RX ADMIN — CEFTAZIDIME 100 GRAM(S): 6 INJECTION, POWDER, FOR SOLUTION INTRAVENOUS at 10:16

## 2021-01-01 RX ADMIN — AMLODIPINE BESYLATE 5 MILLIGRAM(S): 2.5 TABLET ORAL at 05:55

## 2021-01-01 RX ADMIN — Medication 50 MILLIGRAM(S): at 05:56

## 2021-01-01 RX ADMIN — CHLORHEXIDINE GLUCONATE 15 MILLILITER(S): 213 SOLUTION TOPICAL at 17:28

## 2021-01-01 RX ADMIN — CHLORHEXIDINE GLUCONATE 1 APPLICATION(S): 213 SOLUTION TOPICAL at 11:45

## 2021-01-01 RX ADMIN — CEFTAZIDIME 100 GRAM(S): 6 INJECTION, POWDER, FOR SOLUTION INTRAVENOUS at 02:06

## 2021-01-01 RX ADMIN — Medication 650 MILLIGRAM(S): at 10:33

## 2021-01-01 RX ADMIN — METHADONE HYDROCHLORIDE 5 MILLIGRAM(S): 40 TABLET ORAL at 13:21

## 2021-01-01 RX ADMIN — PANTOPRAZOLE SODIUM 40 MILLIGRAM(S): 20 TABLET, DELAYED RELEASE ORAL at 11:45

## 2021-01-01 RX ADMIN — Medication 5 MILLIGRAM(S): at 05:55

## 2021-01-01 RX ADMIN — Medication 50 GRAM(S): at 07:17

## 2021-01-01 RX ADMIN — Medication 650 MILLIGRAM(S): at 17:29

## 2021-01-01 RX ADMIN — METHADONE HYDROCHLORIDE 5 MILLIGRAM(S): 40 TABLET ORAL at 05:55

## 2021-01-01 RX ADMIN — CHLORHEXIDINE GLUCONATE 15 MILLILITER(S): 213 SOLUTION TOPICAL at 05:55

## 2021-01-01 RX ADMIN — CEFTAZIDIME 100 GRAM(S): 6 INJECTION, POWDER, FOR SOLUTION INTRAVENOUS at 17:28

## 2021-01-01 RX ADMIN — Medication 650 MILLIGRAM(S): at 11:03

## 2021-01-01 NOTE — PROGRESS NOTE ADULT - ASSESSMENT
This is a 60 year old Female with a PMH of HTN, DVT on Xarelto, peritonitis s/p Oral's procedure and ileostomy (reversed 2011), AARON who presented to [facility] on 11/18  w/ fevers found to have COVID-19, intubated 11/23, course complicated by superimposed PNA and bacteremia with Stenotrophomonas (12/11, completed Levaquin x7 days), MSSA/P. mirablis bacteremia (on Cefazolin, potentially due to urine vs line-associated and MSSA in sputum due to PNA), also with lung abscesses on CT chest on 12/5 , ARTEMIO/ATN requiring CRRT and HD. Now unable to wean from ventilator and transferred from [facility] on 12/10 to offload COVID unit. Unable to wean from ventilator, s/p tracheostmy with ctsx 12/18 planned for PEG 12/24 but unable to perform due to large ventral hernia     # AMS  - Alert & oriented x3 @baseline.   - Pt is able to opens eyes to voice but does not follow any commands. No purposeful movements appreciated.   - c/w Methadone and Valium 5mg TID, wean slowly as tolerated  - Spot EEG - abnormal study- Moderate nonspecific diffuse or multifocal cerebral dysfunction   - Will consider MRI     # Acute hypoxemic respiratory failure 2/2 covid-19 PNA / MSSA Bacteremia/Pneumonia,  - s/p intubated 11/23. Course complicated by MSSA pneumonia, stenotrophomonas, p. mirabalis, and lung abscess on CT chest from 12/5.   - Pt has been unable to wean from ventilator, s/p Trached by CTSx on 12/18. Now with significant positional air leak from tracheotomy    - Plan for CTSx to remove trach sutures on 1/1/21  - Pt noted to have large leak. Discussed plan for possible trach upsize with CTSx.   - s/p Imipenem (12/16- 12/28) 500mg q12h x 2 weeks for cavitation and MSSA,  - Will switch Levaquin to Ceftazidime starting today (12/29 - ...) for 7 days course (due to resistance to Levaquin) until 1/5/2021  - To OR today with THoracic- and trach change to 8Fr Bivona w/cannula at 13.5cm    # Hypernatremia  - Na 155>>157>>152, s/p D5W 50cc/hr for few hours, and Free water [id number]  - Will increase free water to 450 q 4 - and d'shagufta D5W for now  - Will monitor -Na+ 149 today - continue free water   - Na improving , free water lowered 1/1/21 Na 146     # Hx of DVT (on Xarelto at home)  - s/p heparin gtt in Surg B, but was stopped due to low H/H requiring transfusion of PRBC    - c/w home metoprolol and Amlodipine to better monitor Blood pressure    - CTA negative for PE  - Lovenox restarted give one dose - npo after MN for trach exchange     # ARTEMIO 2/2 ATN   - s/p CRRT, and Bumex and intermittent HD.   - Last HD 12/17, Bumex gtt d/c'ed. Now with good UOP   - c/w Rivera for now, will consider TOV in the near future  - Srict I’s and O’s    # Diet  - Pt is not a candidate for Percutaneous Gastrostomy due to large ventral hernia as per Dr. Cruz  - General surgery to evaluate for Possible Open Gastrostomy tube  - c/w NGT feeding for now, tolerating well  - c/w bowel regimen  - c/w NPH q6hr, and ISS - Monitor FS routinely  - FS accucheck <100 past 24 h -Stop NPH and monitor     #HTN   -amlodipine /metorpolol   - Monitor bp with parameters     #Dispo   - RCU   - PT/OT recs pending   This is a 60 year old Female with a PMH of HTN, DVT on Xarelto, peritonitis s/p Oral's procedure and ileostomy (reversed 2011), AARON who presented to [facility] on 11/18  w/ fevers found to have COVID-19, intubated 11/23, course complicated by superimposed PNA and bacteremia with Stenotrophomonas (12/11, completed Levaquin x7 days), MSSA/P. mirablis bacteremia (on Cefazolin, potentially due to urine vs line-associated and MSSA in sputum due to PNA), also with lung abscesses on CT chest on 12/5 , ARTEMIO/ATN requiring CRRT and HD. Now unable to wean from ventilator and transferred from [facility] on 12/10 to offload COVID unit. Unable to wean from ventilator, s/p tracheostmy with ctsx 12/18 planned for PEG 12/24 but unable to perform due to large ventral hernia     # AMS  - Alert & oriented x3 @baseline.   - Pt is able to opens eyes to voice but does not follow any commands. No purposeful movements appreciated.   - c/w Methadone and Valium 5mg TID, wean slowly as tolerated  - Spot EEG - abnormal study- Moderate nonspecific diffuse or multifocal cerebral dysfunction   - Will consider MRI   - More alert eyes open , following simple commands by blinking /move fingers     # Acute hypoxemic respiratory failure 2/2 covid-19 PNA / MSSA Bacteremia/Pneumonia,  - s/p intubated 11/23. Course complicated by MSSA pneumonia, stenotrophomonas, p. mirabalis, and lung abscess on CT chest from 12/5.   - Pt has been unable to wean from ventilator, s/p Trached by CTSx on 12/18. Now with significant positional air leak from tracheotomy    - Plan for CTSx to remove trach sutures on 1/1/21  - Pt noted to have large leak. Discussed plan for possible trach upsize with CTSx.   - s/p Imipenem (12/16- 12/28) 500mg q12h x 2 weeks for cavitation and MSSA,  - Will switch Levaquin to Ceftazidime starting today (12/29 - ...) for 7 days course (due to resistance to Levaquin) until 1/5/2021  - To OR today with THoracic- and trach change to 8Fr Bivona w/cannula at 13.5cm  - Some bleeding noted when suctioned     # Hypernatremia  - Na 155>>157>>152, s/p D5W 50cc/hr for few hours, and Free water [id number]  - Will increase free water to 450 q 4 - and d'shagufta D5W for now  - Will monitor -Na+ 149 today - continue free water   - Na improving , free water lowered 1/1/21 Na 146     # Hx of DVT (on Xarelto at home)  - s/p heparin gtt in Surg B, but was stopped due to low H/H requiring transfusion of PRBC    - c/w home metoprolol and Amlodipine to better monitor Blood pressure    - CTA negative for PE  - Lovenox restarted give one dose - npo after MN for trach exchange some bleeding from trach - lovenox on hold     # ARTEMIO 2/2 ATN   - s/p CRRT, and Bumex and intermittent HD.   - Last HD 12/17, Bumex gtt d/c'ed. Now with good UOP   - c/w Rivera for now, will consider TOV in the near future  - Srict I’s and O’s    # Diet  - Pt is not a candidate for Percutaneous Gastrostomy due to large ventral hernia as per Dr. Cruz  - General surgery to evaluate for Possible Open Gastrostomy tube  - c/w NGT feeding for now, tolerating well  - c/w bowel regimen  - c/w NPH q6hr, and ISS - Monitor FS routinely  - FS accucheck <100 past 24 h -Stop NPH and monitor - still controlled off NPH for now     #HTN   -amlodipine /metorpolol   - Monitor bp with parameters     #Dispo   - RCU   - PT/OT - dc from skilled PT services at this time 2/2 unable to participate

## 2021-01-01 NOTE — PROGRESS NOTE ADULT - ASSESSMENT
60F morbidly obese, admitted in November for COVID-19 pnuemonia and subsequent respiratory failure and prolonged intubation       - Patient seen at bedside, stable. Is POD 1 from flexible bronchoscopy and tracheostomy exchange. She now has an 8Fr Bivona tracheostomy; she is receiving appropriate tidal volumes with adequate gas exhange. Tracheal site is dry, clean and without signs of infection.   - There were copious thick, bloody secretions suctioned from the distal trachea and bilateral mainstem bronchi. Would recommend not using the inline suction catheters, as they tend to create friable mucosa in the same spot due to repetition. Would recommend the more flexible / delicate catheters for suctioning.  - Care per primary team     Phillip Locke PGY5  Thoracic Surgery  [id number]

## 2021-01-01 NOTE — PROGRESS NOTE ADULT - ATTENDING COMMENTS
Agree with plan as outlined above. Patient seen and examined at bedside. Patient history, laboratory data, and imaging personally reviewed.    60F presenting to [facility] for hypoxemic respiratory failure with ARDS 2/2 COVID19 PNA further c/b superimposed MSSA cavitary PNA and ARF requiring HD. Pt with failure to wean from mechanical ventilation, s/p tracheostomy placement 12/18 and transfer to RCU 12/28.     1. Neuro - encephalopathy, opens eyes slightly but has no purposeful movements. Remains off all IV sedation since 12/24, is on small dose of methadone+diazepam for vent synchrony. CT Head (-) 12/12.  EEG (-).   May consider possible MRI once more stable.    2. Pulm - acute respiratory failure, s/p trach placement (CTSx, 12/18 Proximal 7XLT), mechanical vent dependence and difficulty weaning.   Taken to OR by CTS on 12/31 and trach upsized  c/w SBTs as tolerated, airway clearance therapy and trach care     3. ID - MSSA bacteremia, cavitary PNA s/p completion of Abx with Primaxin on 12/28.   Recurrent fevers and leukocytosis, growing resistant Stenotrophomonas in sputum. Levofloxacin switched to Ceftazidime 12/29, plan for 7 day course through 1/5. If pt continues to spike fevers despite change in Abx regimen, may need repeat CT Chest.   Appreciate ID follow up    4. GI- dysphagia - tolerating feeds via NGT, pt unable to get PEG given large ventral hernia. Poor candidate for surgical open PEG placement at this time. Bowel regimen in place. GI ppx with PPI.     5. Renal - initially ARF on CRRT, following by iHD (last session 12/17) with renal recovery.   Hypernatremia improving, will c/w free H20 via NGT.     6. Endo - DM - NPH+HISS for glucose control.     7. Skin - Wound consult recs appreciated, pt will need Plastic Sx consult once more stable.     8. Dispo - full code, prognosis guarded    I was physically present for the key portions of the evaluation and management (E/M) service provided.  I agree with the above history, physical, and plan which I have reviewed and edited where appropriate.     35 minutes spent on total encounter; more than 50% of the visit was spent counseling and/or coordinating care by the attending physician.     Plan discussed with RCU Team.

## 2021-01-01 NOTE — PROGRESS NOTE ADULT - ATTENDING COMMENTS
S/p trach change, fever+  -cont ceftazidime till 1/4  -monitor temps  -repeat bcx if fever again    Perry Rey  Attending Physician   Division of Infectious Disease  Pager #[phone]  After 5pm/weekend or no response, call #[phone]

## 2021-01-01 NOTE — PROGRESS NOTE ADULT - SUBJECTIVE AND OBJECTIVE BOX
INFECTIOUS DISEASE PROGRESS NOTE  Being followed by ID for bacteremia, pneumonia.   Subjective:    VITALS  Vital Signs Last 24 Hrs  T(C): 38.1 (2021 10:11), Max: 38.1 (2021 10:11)  T(F): 100.6 (2021 10:11), Max: 100.6 (2021 10:11)  HR: 105 (2021 10:11) (92 - 111)  BP: 137/57 (2021 10:11) (108/62 - 137/57)  BP(mean): --  RR: 22 (2021 10:11) (12 - 22)  SpO2: 94% (2021 10:11) (94% - 100%)    I&O's Summary    31 Dec 2020 07:01  -  2021 07:00  --------------------------------------------------------  IN: 1450 mL / OUT: 1350 mL / NET: 100 mL        CAPILLARY BLOOD GLUCOSE      POCT Blood Glucose.: 109 mg/dL (2021 06:16)  POCT Blood Glucose.: 107 mg/dL (31 Dec 2020 23:21)  POCT Blood Glucose.: 104 mg/dL (31 Dec 2020 18:28)  POCT Blood Glucose.: 107 mg/dL (31 Dec 2020 12:12)      PHYSICAL EXAM  General: A&Ox 3; NAD  Eyes: PERRL; EOMI; anicteric sclera  Neck: Supple; no JVD  Respiratory: CTA B/L; no wheezes/crackles/rales auscultated w/ good air movement  Cardiovascular: Regular rhythm/rate; S1/S2; no gallops or murmurs auscultated  Gastrointestinal: Soft; NTND w/out rebound tenderness or guarding; bowel sounds normal  Extremities: WWP; no edema or cyanosis; radial/pedal pulses palpable  Neurological:  CNII-XII grossly intact; no obvious focal deficits    MEDICATIONS  (STANDING):  amLODIPine   Tablet 5 milliGRAM(s) Oral daily  cefTAZidime  IVPB 2 Gram(s) IV Intermittent every 8 hours  chlorhexidine 0.12% Liquid 15 milliLiter(s) Oral Mucosa every 12 hours  chlorhexidine 4% Liquid 1 Application(s) Topical daily  diazepam    Tablet 5 milliGRAM(s) Oral three times a day  insulin lispro (ADMELOG) corrective regimen sliding scale   SubCutaneous every 6 hours  methadone   Solution 5 milliGRAM(s) Oral every 8 hours  metoprolol tartrate 50 milliGRAM(s) Oral two times a day  pantoprazole  Injectable 40 milliGRAM(s) IV Push daily  potassium chloride  10 mEq/100 mL IVPB 10 milliEquivalent(s) IV Intermittent every 1 hour    MEDICATIONS  (PRN):  acetaminophen    Suspension .. 650 milliGRAM(s) Oral every 6 hours PRN Temp greater or equal to 38C (100.4F)      LABS                        7.2    13.86 )-----------( 238      ( 2021 04:32 )             26.0         146<H>  |  103  |  15  ----------------------------<  83  3.9   |  31  |  0.54    Ca    8.4      2021 04:32  Phos  3.6     12-30  Mg     1.5         TPro  6.5  /  Alb  2.3<L>  /  TBili  0.5  /  DBili  x   /  AST  21  /  ALT  8   /  AlkPhos  81      LIVER FUNCTIONS - ( 2021 04:32 )  Alb: 2.3 g/dL / Pro: 6.5 g/dL / ALK PHOS: 81 U/L / ALT: 8 U/L / AST: 21 U/L / GGT: x           PT/INR - ( 31 Dec 2020 06:16 )   PT: 13.4 sec;   INR: 1.18 ratio         PTT - ( 31 Dec 2020 06:16 )  PTT:19.8 sec  Urinalysis Basic - ( 30 Dec 2020 16:24 )    Color: Yellow / Appearance: Clear / S.016 / pH: x  Gluc: x / Ketone: Negative  / Bili: Negative / Urobili: 3 mg/dL   Blood: x / Protein: 30 mg/dL / Nitrite: Negative   Leuk Esterase: Negative / RBC: 5 /HPF / WBC 13 /HPF   Sq Epi: x / Non Sq Epi: 2 /HPF / Bacteria: Negative            Culture - Sputum (collected 30 Dec 2020 18:23)  Source: .Sputum Sputum  Gram Stain (30 Dec 2020 20:00):    Moderate polymorphonuclear leukocytes per low power field    Few Gram Negative Rods per oil power field  Preliminary Report (31 Dec 2020 17:10):    Normal Respiratory Jocelynn present    Culture - Blood (collected 30 Dec 2020 18:21)  Source: .Blood Blood-Venous  Preliminary Report (31 Dec 2020 19:02):    No growth to date.    Culture - Blood (collected 30 Dec 2020 18:21)  Source: .Blood Blood-Peripheral  Preliminary Report (31 Dec 2020 19:02):    No growth to date.    Culture - Sputum (collected 27 Dec 2020 10:30)  Source: .Sputum Sputum  Gram Stain (27 Dec 2020 14:48):    Few polymorphonuclear leukocytes per low power field    Few Squamous epithelial cells per low power field    Moderate Gram Negative Rods per oil power field  Final Report (29 Dec 2020 09:59):    Numerous Stenotrophomonas maltophilia    Normal Respiratory Jocelynn absent  Organism: Stenotrophomonas maltophilia (29 Dec 2020 09:59)  Organism: Stenotrophomonas maltophilia (29 Dec 2020 09:59)    Culture - Blood (collected 27 Dec 2020 10:22)  Source: .Blood Blood  Preliminary Report (28 Dec 2020 11:01):    No growth to date.    Culture - Blood (collected 27 Dec 2020 10:22)  Source: .Blood Blood  Preliminary Report (28 Dec 2020 11:01):    No growth to date.    Culture - Blood (collected 21 Dec 2020 17:19)  Source: .Blood Blood-Venous  Final Report (27 Dec 2020 01:00):    No Growth Final    Culture - Blood (collected 21 Dec 2020 16:37)  Source: .Blood Blood-Peripheral  Final Report (27 Dec 2020 01:00):    No Growth Final     INFECTIOUS DISEASE PROGRESS NOTE  Being followed by ID for bacteremia, pneumonia.     VITALS  Vital Signs Last 24 Hrs  T(C): 38.1 (2021 10:11), Max: 38.1 (2021 10:11)  T(F): 100.6 (2021 10:11), Max: 100.6 (2021 10:11)  HR: 105 (2021 10:11) (92 - 111)  BP: 137/57 (2021 10:11) (108/62 - 137/57)  BP(mean): --  RR: 22 (2021 10:11) (12 - 22)  SpO2: 94% (2021 10:11) (94% - 100%)    I&O's Summary    31 Dec 2020 07:01  -  2021 07:00  --------------------------------------------------------  IN: 1450 mL / OUT: 1350 mL / NET: 100 mL        CAPILLARY BLOOD GLUCOSE  POCT Blood Glucose.: 109 mg/dL (2021 06:16)  POCT Blood Glucose.: 107 mg/dL (31 Dec 2020 23:21)  POCT Blood Glucose.: 104 mg/dL (31 Dec 2020 18:28)  POCT Blood Glucose.: 107 mg/dL (31 Dec 2020 12:12)      PHYSICAL EXAM  General: Sleeping comfortably   Eyes: anicteric sclera  Neck: Supple; blood in tracheostomy and ventilator tubing   Respiratory: CTAB   Cardiovascular: Regular rhythm/rate; S1/S2; no gallops or murmurs auscultated  Gastrointestinal: Soft; NTND w/out rebound tenderness or guarding; bowel sounds normal; +PEG  Extremities: WWP; no edema or cyanosis;  Neurological: Does not wake up to verbal stimuli     MEDICATIONS  (STANDING):  amLODIPine   Tablet 5 milliGRAM(s) Oral daily  cefTAZidime  IVPB 2 Gram(s) IV Intermittent every 8 hours  chlorhexidine 0.12% Liquid 15 milliLiter(s) Oral Mucosa every 12 hours  chlorhexidine 4% Liquid 1 Application(s) Topical daily  diazepam    Tablet 5 milliGRAM(s) Oral three times a day  insulin lispro (ADMELOG) corrective regimen sliding scale   SubCutaneous every 6 hours  methadone   Solution 5 milliGRAM(s) Oral every 8 hours  metoprolol tartrate 50 milliGRAM(s) Oral two times a day  pantoprazole  Injectable 40 milliGRAM(s) IV Push daily  potassium chloride  10 mEq/100 mL IVPB 10 milliEquivalent(s) IV Intermittent every 1 hour    MEDICATIONS  (PRN):  acetaminophen    Suspension .. 650 milliGRAM(s) Oral every 6 hours PRN Temp greater or equal to 38C (100.4F)      LABS                        7.2    13.86 )-----------( 238      ( 2021 04:32 )             26.0     01-    146<H>  |  103  |  15  ----------------------------<  83  3.9   |  31  |  0.54    Ca    8.4      2021 04:32  Phos  3.6     12-30  Mg     1.5         TPro  6.5  /  Alb  2.3<L>  /  TBili  0.5  /  DBili  x   /  AST  21  /  ALT  8   /  AlkPhos  81      LIVER FUNCTIONS - ( 2021 04:32 )  Alb: 2.3 g/dL / Pro: 6.5 g/dL / ALK PHOS: 81 U/L / ALT: 8 U/L / AST: 21 U/L / GGT: x           PT/INR - ( 31 Dec 2020 06:16 )   PT: 13.4 sec;   INR: 1.18 ratio         PTT - ( 31 Dec 2020 06:16 )  PTT:19.8 sec  Urinalysis Basic - ( 30 Dec 2020 16:24 )    Color: Yellow / Appearance: Clear / S.016 / pH: x  Gluc: x / Ketone: Negative  / Bili: Negative / Urobili: 3 mg/dL   Blood: x / Protein: 30 mg/dL / Nitrite: Negative   Leuk Esterase: Negative / RBC: 5 /HPF / WBC 13 /HPF   Sq Epi: x / Non Sq Epi: 2 /HPF / Bacteria: Negative    Culture - Sputum (collected 30 Dec 2020 18:23)  Source: .Sputum Sputum  Gram Stain (30 Dec 2020 20:00):    Moderate polymorphonuclear leukocytes per low power field    Few Gram Negative Rods per oil power field  Preliminary Report (31 Dec 2020 17:10):    Normal Respiratory Jocelynn present    Culture - Blood (collected 30 Dec 2020 18:21)  Source: .Blood Blood-Venous  Preliminary Report (31 Dec 2020 19:02):    No growth to date.    Culture - Blood (collected 30 Dec 2020 18:21)  Source: .Blood Blood-Peripheral  Preliminary Report (31 Dec 2020 19:02):    No growth to date.    Culture - Sputum (collected 27 Dec 2020 10:30)  Source: .Sputum Sputum  Gram Stain (27 Dec 2020 14:48):    Few polymorphonuclear leukocytes per low power field    Few Squamous epithelial cells per low power field    Moderate Gram Negative Rods per oil power field  Final Report (29 Dec 2020 09:59):    Numerous Stenotrophomonas maltophilia    Normal Respiratory Jocelynn absent  Organism: Stenotrophomonas maltophilia (29 Dec 2020 09:59)  Organism: Stenotrophomonas maltophilia (29 Dec 2020 09:59)    Culture - Blood (collected 27 Dec 2020 10:22)  Source: .Blood Blood  Preliminary Report (28 Dec 2020 11:01):    No growth to date.    Culture - Blood (collected 27 Dec 2020 10:22)  Source: .Blood Blood  Preliminary Report (28 Dec 2020 11:01):    No growth to date.    Culture - Blood (collected 21 Dec 2020 17:19)  Source: .Blood Blood-Venous  Final Report (27 Dec 2020 01:00):    No Growth Final    Culture - Blood (collected 21 Dec 2020 16:37)  Source: .Blood Blood-Peripheral  Final Report (27 Dec 2020 01:00):    No Growth Final    < from: Xray Chest 1 View AP/PA (12.27.20 @ 16:30) >  IMPRESSION:    Enteric tube with tip in the stomach.    < end of copied text >   INFECTIOUS DISEASE PROGRESS NOTE  Being followed by ID for bacteremia, pneumonia.     VITALS  Vital Signs Last 24 Hrs  T(C): 38.1 (2021 10:11), Max: 38.1 (2021 10:11)  T(F): 100.6 (2021 10:11), Max: 100.6 (2021 10:11)  HR: 105 (2021 10:11) (92 - 111)  BP: 137/57 (2021 10:11) (108/62 - 137/57)  BP(mean): --  RR: 22 (2021 10:11) (12 - 22)  SpO2: 94% (2021 10:11) (94% - 100%)    I&O's Summary    31 Dec 2020 07:01  -  2021 07:00  --------------------------------------------------------  IN: 1450 mL / OUT: 1350 mL / NET: 100 mL        CAPILLARY BLOOD GLUCOSE  POCT Blood Glucose.: 109 mg/dL (2021 06:16)  POCT Blood Glucose.: 107 mg/dL (31 Dec 2020 23:21)  POCT Blood Glucose.: 104 mg/dL (31 Dec 2020 18:28)  POCT Blood Glucose.: 107 mg/dL (31 Dec 2020 12:12)      PHYSICAL EXAM  General: Sleeping comfortably   Eyes: anicteric sclera  Neck: Supple; blood in tracheostomy and ventilator tubing   Respiratory: CTAB   Cardiovascular: Regular rhythm/rate; S1/S2; no gallops or murmurs auscultated  Gastrointestinal: Soft; NTND w/out rebound tenderness or guarding; bowel sounds normal;  Extremities: WWP; no edema or cyanosis;  Neurological: Does not wake up to verbal stimuli     MEDICATIONS  (STANDING):  amLODIPine   Tablet 5 milliGRAM(s) Oral daily  cefTAZidime  IVPB 2 Gram(s) IV Intermittent every 8 hours  chlorhexidine 0.12% Liquid 15 milliLiter(s) Oral Mucosa every 12 hours  chlorhexidine 4% Liquid 1 Application(s) Topical daily  diazepam    Tablet 5 milliGRAM(s) Oral three times a day  insulin lispro (ADMELOG) corrective regimen sliding scale   SubCutaneous every 6 hours  methadone   Solution 5 milliGRAM(s) Oral every 8 hours  metoprolol tartrate 50 milliGRAM(s) Oral two times a day  pantoprazole  Injectable 40 milliGRAM(s) IV Push daily  potassium chloride  10 mEq/100 mL IVPB 10 milliEquivalent(s) IV Intermittent every 1 hour    MEDICATIONS  (PRN):  acetaminophen    Suspension .. 650 milliGRAM(s) Oral every 6 hours PRN Temp greater or equal to 38C (100.4F)      LABS                        7.2    13.86 )-----------( 238      ( 2021 04:32 )             26.0     -    146<H>  |  103  |  15  ----------------------------<  83  3.9   |  31  |  0.54    Ca    8.4      2021 04:32  Phos  3.6     12-30  Mg     1.5         TPro  6.5  /  Alb  2.3<L>  /  TBili  0.5  /  DBili  x   /  AST  21  /  ALT  8   /  AlkPhos  81      LIVER FUNCTIONS - ( 2021 04:32 )  Alb: 2.3 g/dL / Pro: 6.5 g/dL / ALK PHOS: 81 U/L / ALT: 8 U/L / AST: 21 U/L / GGT: x           PT/INR - ( 31 Dec 2020 06:16 )   PT: 13.4 sec;   INR: 1.18 ratio         PTT - ( 31 Dec 2020 06:16 )  PTT:19.8 sec  Urinalysis Basic - ( 30 Dec 2020 16:24 )    Color: Yellow / Appearance: Clear / S.016 / pH: x  Gluc: x / Ketone: Negative  / Bili: Negative / Urobili: 3 mg/dL   Blood: x / Protein: 30 mg/dL / Nitrite: Negative   Leuk Esterase: Negative / RBC: 5 /HPF / WBC 13 /HPF   Sq Epi: x / Non Sq Epi: 2 /HPF / Bacteria: Negative    Culture - Sputum (collected 30 Dec 2020 18:23)  Source: .Sputum Sputum  Gram Stain (30 Dec 2020 20:00):    Moderate polymorphonuclear leukocytes per low power field    Few Gram Negative Rods per oil power field  Preliminary Report (31 Dec 2020 17:10):    Normal Respiratory Jocelynn present    Culture - Blood (collected 30 Dec 2020 18:21)  Source: .Blood Blood-Venous  Preliminary Report (31 Dec 2020 19:02):    No growth to date.    Culture - Blood (collected 30 Dec 2020 18:21)  Source: .Blood Blood-Peripheral  Preliminary Report (31 Dec 2020 19:02):    No growth to date.    Culture - Sputum (collected 27 Dec 2020 10:30)  Source: .Sputum Sputum  Gram Stain (27 Dec 2020 14:48):    Few polymorphonuclear leukocytes per low power field    Few Squamous epithelial cells per low power field    Moderate Gram Negative Rods per oil power field  Final Report (29 Dec 2020 09:59):    Numerous Stenotrophomonas maltophilia    Normal Respiratory Jocelynn absent  Organism: Stenotrophomonas maltophilia (29 Dec 2020 09:59)  Organism: Stenotrophomonas maltophilia (29 Dec 2020 09:59)    Culture - Blood (collected 27 Dec 2020 10:22)  Source: .Blood Blood  Preliminary Report (28 Dec 2020 11:01):    No growth to date.    Culture - Blood (collected 27 Dec 2020 10:22)  Source: .Blood Blood  Preliminary Report (28 Dec 2020 11:01):    No growth to date.    Culture - Blood (collected 21 Dec 2020 17:19)  Source: .Blood Blood-Venous  Final Report (27 Dec 2020 01:00):    No Growth Final    Culture - Blood (collected 21 Dec 2020 16:37)  Source: .Blood Blood-Peripheral  Final Report (27 Dec 2020 01:00):    No Growth Final    < from: Xray Chest 1 View AP/PA (12.27.20 @ 16:30) >  IMPRESSION:    Enteric tube with tip in the stomach.    < end of copied text >

## 2021-01-01 NOTE — PROGRESS NOTE ADULT - SUBJECTIVE AND OBJECTIVE BOX
CHIEF COMPLAINT: Patient is a 60y old  Female who presents with a chief complaint of Transfer from [facility] (31 Dec 2020 22:20)      Interval Events: Pt seen and evaluated by team at bedside.  S/p OR for exchange of trach to ; 8Fr Bivona w/cannula at 13.5cm.     REVIEW OF SYSTEMS:  [x ] Unable to assess ROS because AMS /nonverbal    OBJECTIVE:  ICU Vital Signs Last 24 Hrs  T(C): 37.1 (2021 06:00), Max: 38 (31 Dec 2020 10:45)  T(F): 98.8 (2021 06:00), Max: 100.4 (31 Dec 2020 10:45)  HR: 111 (2021 06:00) (92 - 111)  BP: 133/58 (2021 06:00) (108/62 - 133/58)  BP(mean): --  ABP: --  ABP(mean): --  RR: 12 (2021 06:) (12 - 16)  SpO2: 100% (2021 06:) (94% - 100%)    Mode: AC/ CMV (Assist Control/ Continuous Mandatory Ventilation), RR (machine): 12, FiO2: 50, PEEP: 5, ITime: 1, MAP: 19, PC: 30, PIP: 39     @ 07:  -   @ 07:00  --------------------------------------------------------  IN: 600 mL / OUT: 425 mL / NET: 175 mL     @ 07:  -   @ 06:35  --------------------------------------------------------  IN: 0 mL / OUT: 1250 mL / NET: -1250 mL      CAPILLARY BLOOD GLUCOSE      POCT Blood Glucose.: 109 mg/dL (2021 06:16)      HOSPITAL MEDICATIONS:  MEDICATIONS  (STANDING):  amLODIPine   Tablet 5 milliGRAM(s) Oral daily  cefTAZidime  IVPB 2 Gram(s) IV Intermittent every 8 hours  chlorhexidine 0.12% Liquid 15 milliLiter(s) Oral Mucosa every 12 hours  chlorhexidine 4% Liquid 1 Application(s) Topical daily  diazepam    Tablet 5 milliGRAM(s) Oral three times a day  insulin lispro (ADMELOG) corrective regimen sliding scale   SubCutaneous every 6 hours  methadone   Solution 5 milliGRAM(s) Oral every 8 hours  metoprolol tartrate 50 milliGRAM(s) Oral two times a day  pantoprazole  Injectable 40 milliGRAM(s) IV Push daily  potassium chloride  10 mEq/100 mL IVPB 10 milliEquivalent(s) IV Intermittent every 1 hour    MEDICATIONS  (PRN):  acetaminophen    Suspension .. 650 milliGRAM(s) Oral every 6 hours PRN Temp greater or equal to 38.5C (101.3F)      LABS:                        7.2    13.86 )-----------( 238      ( 2021 04:32 )             26.0         146<H>  |  103  |  15  ----------------------------<  83  3.9   |  31  |  0.54    Ca    8.4      2021 04:32  Phos  3.6     12-30  Mg     1.5         TPro  6.5  /  Alb  2.3<L>  /  TBili  0.5  /  DBili  x   /  AST  21  /  ALT  8   /  AlkPhos  81  -    PT/INR - ( 31 Dec 2020 06:16 )   PT: 13.4 sec;   INR: 1.18 ratio         PTT - ( 31 Dec 2020 06:16 )  PTT:19.8 sec  Urinalysis Basic - ( 30 Dec 2020 16:24 )    Color: Yellow / Appearance: Clear / S.016 / pH: x  Gluc: x / Ketone: Negative  / Bili: Negative / Urobili: 3 mg/dL   Blood: x / Protein: 30 mg/dL / Nitrite: Negative   Leuk Esterase: Negative / RBC: 5 /HPF / WBC 13 /HPF   Sq Epi: x / Non Sq Epi: 2 /HPF / Bacteria: Negative            MICROBIOLOGY:     RADIOLOGY:  [ ] Reviewed and interpreted by me    PULMONARY FUNCTION TESTS:    EKG: CHIEF COMPLAINT: Patient is a 60y old  Female who presents with a chief complaint of Transfer from [facility] (31 Dec 2020 22:20)      Interval Events: Pt seen and evaluated by team at bedside.  S/p OR for exchange of trach to ; 8Fr Bivona w/cannula at 13.5cm.     REVIEW OF SYSTEMS:  [x ] Unable to assess ROS because AMS /nonverbal    OBJECTIVE:  ICU Vital Signs Last 24 Hrs  T(C): 37.1 (2021 06:00), Max: 38 (31 Dec 2020 10:45)  T(F): 98.8 (2021 06:00), Max: 100.4 (31 Dec 2020 10:45)  HR: 111 (2021 06:00) (92 - 111)  BP: 133/58 (2021 06:00) (108/62 - 133/58)  BP(mean): --  ABP: --  ABP(mean): --  RR: 12 (2021 06:) (12 - 16)  SpO2: 100% (2021 06:) (94% - 100%)    Mode: AC/ CMV (Assist Control/ Continuous Mandatory Ventilation), RR (machine): 12, FiO2: 50, PEEP: 5, ITime: 1, MAP: 19, PC: 30, PIP: 39     @ 07:  -   @ 07:00  --------------------------------------------------------  IN: 600 mL / OUT: 425 mL / NET: 175 mL     @ 07:  -   @ 06:35  --------------------------------------------------------  IN: 0 mL / OUT: 1250 mL / NET: -1250 mL      CAPILLARY BLOOD GLUCOSE      POCT Blood Glucose.: 109 mg/dL (2021 06:16)      HOSPITAL MEDICATIONS:  MEDICATIONS  (STANDING):  amLODIPine   Tablet 5 milliGRAM(s) Oral daily  cefTAZidime  IVPB 2 Gram(s) IV Intermittent every 8 hours  chlorhexidine 0.12% Liquid 15 milliLiter(s) Oral Mucosa every 12 hours  chlorhexidine 4% Liquid 1 Application(s) Topical daily  diazepam    Tablet 5 milliGRAM(s) Oral three times a day  insulin lispro (ADMELOG) corrective regimen sliding scale   SubCutaneous every 6 hours  methadone   Solution 5 milliGRAM(s) Oral every 8 hours  metoprolol tartrate 50 milliGRAM(s) Oral two times a day  pantoprazole  Injectable 40 milliGRAM(s) IV Push daily  potassium chloride  10 mEq/100 mL IVPB 10 milliEquivalent(s) IV Intermittent every 1 hour    MEDICATIONS  (PRN):  acetaminophen    Suspension .. 650 milliGRAM(s) Oral every 6 hours PRN Temp greater or equal to 38.5C (101.3F)      LABS:                        7.2    13.86 )-----------( 238      ( 2021 04:32 )             26.0         146<H>  |  103  |  15  ----------------------------<  83  3.9   |  31  |  0.54    Ca    8.4      2021 04:32  Phos  3.6     12-30  Mg     1.5         TPro  6.5  /  Alb  2.3<L>  /  TBili  0.5  /  DBili  x   /  AST  21  /  ALT  8   /  AlkPhos  81  -    PT/INR - ( 31 Dec 2020 06:16 )   PT: 13.4 sec;   INR: 1.18 ratio         PTT - ( 31 Dec 2020 06:16 )  PTT:19.8 sec  Urinalysis Basic - ( 30 Dec 2020 16:24 )    Color: Yellow / Appearance: Clear / S.016 / pH: x  Gluc: x / Ketone: Negative  / Bili: Negative / Urobili: 3 mg/dL   Blood: x / Protein: 30 mg/dL / Nitrite: Negative   Leuk Esterase: Negative / RBC: 5 /HPF / WBC 13 /HPF   Sq Epi: x / Non Sq Epi: 2 /HPF / Bacteria: Negative            MICROBIOLOGY:   Culture Results:   No growth to date. (20 @ 18:21)     Culture - Sputum . (20 @ 18:23)   Gram Stain:   Moderate polymorphonuclear leukocytes per low power field   Few Gram Negative Rods per oil power field   Specimen Source: .Sputum Sputum   Culture Results:   Normal Respiratory Jocelynn present       RADIOLOGY:  [ ] Reviewed and interpreted by me    PULMONARY FUNCTION TESTS:    EKG:

## 2021-01-01 NOTE — PROGRESS NOTE ADULT - SUBJECTIVE AND OBJECTIVE BOX
Thoracic Surgery  CC: chronic hypoxemic respiratory failure  HPI: 60F h/o obesity, A[facility]ON admitted in November for COVID-19 pnuemonia and subsequent respiratory failure and prolonged intubation   24/Overnight events: Patient seen at bedside, stable. She is now POD 1 from flexible bronchoscopy and tracheostomy exchange. She now has an 8Fr Bivona tracheostomy; she is receiving appropriate tidal volumes with adequate gas exhange. Tracheal site is dry, clean and without signs of infection.      Objective:  Vital Signs Last 24 Hrs  T(C): 37.1 (2021 06:00), Max: 38 (31 Dec 2020 10:45)  T(F): 98.8 (2021 06:00), Max: 100.4 (31 Dec 2020 10:45)  HR: 93 (2021 06:59) (92 - 111)  BP: 133/58 (2021 06:00) (108/62 - 133/58)  BP(mean): --  RR: 12 (2021 06:00) (12 - 16)  SpO2: 99% (2021 06:59) (94% - 100%)    Physical Exam:  General: opens eyes spontaneously, but does not appear to have any purposeful movement, does not follow commands  Respiratory: unable to wean from mechanical ventilator, although with minimal ventilatory settings.        MEDICATIONS  (STANDING):  amLODIPine   Tablet 5 milliGRAM(s) Oral daily  cefTAZidime  IVPB 2 Gram(s) IV Intermittent every 8 hours  chlorhexidine 0.12% Liquid 15 milliLiter(s) Oral Mucosa every 12 hours  chlorhexidine 4% Liquid 1 Application(s) Topical daily  diazepam    Tablet 5 milliGRAM(s) Oral three times a day  insulin lispro (ADMELOG) corrective regimen sliding scale   SubCutaneous every 6 hours  methadone   Solution 5 milliGRAM(s) Oral every 8 hours  metoprolol tartrate 50 milliGRAM(s) Oral two times a day  pantoprazole  Injectable 40 milliGRAM(s) IV Push daily  potassium chloride  10 mEq/100 mL IVPB 10 milliEquivalent(s) IV Intermittent every 1 hour    MEDICATIONS  (PRN):  acetaminophen    Suspension .. 650 milliGRAM(s) Oral every 6 hours PRN Temp greater or equal to 38.5C (101.3F)    I&O's Detail    31 Dec 2020 07:01  -  2021 07:00  --------------------------------------------------------  IN:    Free Water: 1350 mL    IV PiggyBack: 100 mL  Total IN: 1450 mL    OUT:    Indwelling Catheter - Urethral (mL): 1250 mL    Rectal Tube (mL): 100 mL  Total OUT: 1350 mL    Total NET: 100 mL       LABS:                        7.2    13.86 )-----------( 238      ( 2021 04:32 )             26.0         146<H>  |  103  |  15  ----------------------------<  83  3.9   |  31  |  0.54    Ca    8.4      2021 04:32  Phos  3.6     12-30  Mg     1.5         TPro  6.5  /  Alb  2.3<L>  /  TBili  0.5  /  DBili  x   /  AST  21  /  ALT  8   /  AlkPhos  81      PT/INR - ( 31 Dec 2020 06:16 )   PT: 13.4 sec;   INR: 1.18 ratio         PTT - ( 31 Dec 2020 06:16 )  PTT:19.8 sec  Urinalysis Basic - ( 30 Dec 2020 16:24 )    Color: Yellow / Appearance: Clear / S.016 / pH: x  Gluc: x / Ketone: Negative  / Bili: Negative / Urobili: 3 mg/dL   Blood: x / Protein: 30 mg/dL / Nitrite: Negative   Leuk Esterase: Negative / RBC: 5 /HPF / WBC 13 /HPF   Sq Epi: x / Non Sq Epi: 2 /HPF / Bacteria: Negative

## 2021-01-01 NOTE — PROGRESS NOTE ADULT - ASSESSMENT
59 yo woman with AARON, h/o Lockett's procedure, s/p reversal 2011 who was admitted to [facility] 11/17 with covid pneumonia.  She received course of remdesevir and dexamethasone; required intubation 11/23 for acute hypoxemic respiratory failure. Hospital course complicated by Proteus bacteremia, MSSA bacteremia/ pneumonia, and Stenotrophomonas respiratory infection, as well as ATN requiring CVVHD --> HD ---. off dialysis.  CT chest 12/4 new large consolidation with cavitation in right lung.  Patient transferred [facility] 12/10; spiked high fever; antibiotics broadened to vanco x 1 and meropenem 500 mg iv 24h.  Blood culture 11/27 MSSA and proteus; 11/28 MSSA.  Bacteremia cleared.   Blood cultures 12/1, 12/11, 12/21, 12/27 no growth.  Sputum culture 12/1, 12/12, 12/27 Stenotrophomonas.    Multifocal covid pneumonia with respiratory failure, MSSA bacteremia 11/27 with cavitary pneumonia, Proteus bacteremia 11/27, Stenotrophomonas pneumonia, ATN.  CxR 12/16- large right lung opacity.  aspiration pneumonia superimposed on ARDS due to covid.   s/p trach 12/18.  12/29 Fever, blood tinged sputum and Stenotrophomonas in sputum.  meropenem changed to ceftazidime.   12/31 trach leak.  for OR.     suggest:  c/w ceftazidime --> 1/4  follow cultures  trend temp, wbc, creat

## 2021-01-02 LAB
ALBUMIN SERPL ELPH-MCNC: 2.2 G/DL — LOW (ref 3.3–5)
ALP SERPL-CCNC: 82 U/L — SIGNIFICANT CHANGE UP (ref 40–120)
ALT FLD-CCNC: 8 U/L — SIGNIFICANT CHANGE UP (ref 4–33)
ANION GAP SERPL CALC-SCNC: 11 MMOL/L — SIGNIFICANT CHANGE UP (ref 7–14)
AST SERPL-CCNC: 14 U/L — SIGNIFICANT CHANGE UP (ref 4–32)
BILIRUB SERPL-MCNC: 0.6 MG/DL — SIGNIFICANT CHANGE UP (ref 0.2–1.2)
BUN SERPL-MCNC: 11 MG/DL — SIGNIFICANT CHANGE UP (ref 7–23)
CALCIUM SERPL-MCNC: 7.9 MG/DL — LOW (ref 8.4–10.5)
CHLORIDE SERPL-SCNC: 101 MMOL/L — SIGNIFICANT CHANGE UP (ref 98–107)
CO2 SERPL-SCNC: 31 MMOL/L — SIGNIFICANT CHANGE UP (ref 22–31)
CREAT SERPL-MCNC: 0.39 MG/DL — LOW (ref 0.5–1.3)
GLUCOSE BLDC GLUCOMTR-MCNC: 111 MG/DL — HIGH (ref 70–99)
GLUCOSE BLDC GLUCOMTR-MCNC: 112 MG/DL — HIGH (ref 70–99)
GLUCOSE BLDC GLUCOMTR-MCNC: 114 MG/DL — HIGH (ref 70–99)
GLUCOSE BLDC GLUCOMTR-MCNC: 120 MG/DL — HIGH (ref 70–99)
GLUCOSE SERPL-MCNC: 92 MG/DL — SIGNIFICANT CHANGE UP (ref 70–99)
HCT VFR BLD CALC: 28.8 % — LOW (ref 34.5–45)
HGB BLD-MCNC: 8 G/DL — LOW (ref 11.5–15.5)
MAGNESIUM SERPL-MCNC: 1.4 MG/DL — LOW (ref 1.6–2.6)
MCHC RBC-ENTMCNC: 27.8 GM/DL — LOW (ref 32–36)
MCHC RBC-ENTMCNC: 28.7 PG — SIGNIFICANT CHANGE UP (ref 27–34)
MCV RBC AUTO: 103.2 FL — HIGH (ref 80–100)
NRBC # BLD: 0 /100 WBCS — SIGNIFICANT CHANGE UP
NRBC # FLD: 0.02 K/UL — HIGH
PLATELET # BLD AUTO: 281 K/UL — SIGNIFICANT CHANGE UP (ref 150–400)
POTASSIUM SERPL-MCNC: 3.9 MMOL/L — SIGNIFICANT CHANGE UP (ref 3.5–5.3)
POTASSIUM SERPL-SCNC: 3.9 MMOL/L — SIGNIFICANT CHANGE UP (ref 3.5–5.3)
PROT SERPL-MCNC: 6.5 G/DL — SIGNIFICANT CHANGE UP (ref 6–8.3)
RBC # BLD: 2.79 M/UL — LOW (ref 3.8–5.2)
RBC # FLD: 19.6 % — HIGH (ref 10.3–14.5)
SODIUM SERPL-SCNC: 143 MMOL/L — SIGNIFICANT CHANGE UP (ref 135–145)
WBC # BLD: 14.78 K/UL — HIGH (ref 3.8–10.5)
WBC # FLD AUTO: 14.78 K/UL — HIGH (ref 3.8–10.5)

## 2021-01-02 PROCEDURE — 99232 SBSQ HOSP IP/OBS MODERATE 35: CPT | Mod: 57

## 2021-01-02 PROCEDURE — 99233 SBSQ HOSP IP/OBS HIGH 50: CPT | Mod: GC

## 2021-01-02 PROCEDURE — 71045 X-RAY EXAM CHEST 1 VIEW: CPT | Mod: 26

## 2021-01-02 RX ORDER — MAGNESIUM SULFATE 500 MG/ML
2 VIAL (ML) INJECTION ONCE
Refills: 0 | Status: COMPLETED | OUTPATIENT
Start: 2021-01-02 | End: 2021-01-02

## 2021-01-02 RX ADMIN — AMLODIPINE BESYLATE 5 MILLIGRAM(S): 2.5 TABLET ORAL at 05:03

## 2021-01-02 RX ADMIN — CHLORHEXIDINE GLUCONATE 15 MILLILITER(S): 213 SOLUTION TOPICAL at 18:12

## 2021-01-02 RX ADMIN — Medication 5 MILLIGRAM(S): at 05:03

## 2021-01-02 RX ADMIN — CEFTAZIDIME 100 GRAM(S): 6 INJECTION, POWDER, FOR SOLUTION INTRAVENOUS at 18:12

## 2021-01-02 RX ADMIN — CEFTAZIDIME 100 GRAM(S): 6 INJECTION, POWDER, FOR SOLUTION INTRAVENOUS at 01:52

## 2021-01-02 RX ADMIN — Medication 5 MILLIGRAM(S): at 13:22

## 2021-01-02 RX ADMIN — CEFTAZIDIME 100 GRAM(S): 6 INJECTION, POWDER, FOR SOLUTION INTRAVENOUS at 09:04

## 2021-01-02 RX ADMIN — Medication 50 MILLIGRAM(S): at 05:04

## 2021-01-02 RX ADMIN — Medication 50 GRAM(S): at 09:03

## 2021-01-02 RX ADMIN — CHLORHEXIDINE GLUCONATE 1 APPLICATION(S): 213 SOLUTION TOPICAL at 11:22

## 2021-01-02 RX ADMIN — METHADONE HYDROCHLORIDE 5 MILLIGRAM(S): 40 TABLET ORAL at 13:22

## 2021-01-02 RX ADMIN — PANTOPRAZOLE SODIUM 40 MILLIGRAM(S): 20 TABLET, DELAYED RELEASE ORAL at 11:21

## 2021-01-02 RX ADMIN — CHLORHEXIDINE GLUCONATE 15 MILLILITER(S): 213 SOLUTION TOPICAL at 05:03

## 2021-01-02 RX ADMIN — Medication 50 MILLIGRAM(S): at 18:12

## 2021-01-02 RX ADMIN — METHADONE HYDROCHLORIDE 5 MILLIGRAM(S): 40 TABLET ORAL at 05:03

## 2021-01-02 NOTE — PROGRESS NOTE ADULT - SUBJECTIVE AND OBJECTIVE BOX
CHIEF COMPLAINT: Patient is a 60y old  Female who presents with a chief complaint of Transfer from [facility] (01 Jan 2021 10:29)      Interval Events: Pt seen and evaluated at bedside by team. S/P OR 12/31 for flex. bronch and trach exchange    REVIEW OF SYSTEMS:  [x ] Unable to assess ROS because AMS    OBJECTIVE:  ICU Vital Signs Last 24 Hrs  T(C): 37.6 (02 Jan 2021 05:00), Max: 38.7 (01 Jan 2021 13:18)  T(F): 99.7 (02 Jan 2021 05:00), Max: 101.6 (01 Jan 2021 13:18)  HR: 84 (02 Jan 2021 06:19) (84 - 115)  BP: 136/61 (02 Jan 2021 05:00) (126/57 - 137/57)  BP(mean): --  ABP: --  ABP(mean): --  RR: 20 (02 Jan 2021 05:00) (20 - 34)  SpO2: 93% (02 Jan 2021 06:19) (92% - 99%)    Mode: AC/ CMV (Assist Control/ Continuous Mandatory Ventilation), RR (machine): 12, FiO2: 40, PEEP: 5, ITime: 1, MAP: 11, PC: 30, PIP: 40    12-31 @ 07:01 - 01-01 @ 07:00  --------------------------------------------------------  IN: 1450 mL / OUT: 1350 mL / NET: 100 mL    01-01 @ 07:01  -  01-02 @ 06:28  --------------------------------------------------------  IN: 1280 mL / OUT: 1000 mL / NET: 280 mL      CAPILLARY BLOOD GLUCOSE      POCT Blood Glucose.: 111 mg/dL (02 Jan 2021 05:25)        HOSPITAL MEDICATIONS:  MEDICATIONS  (STANDING):  amLODIPine   Tablet 5 milliGRAM(s) Oral daily  cefTAZidime  IVPB 2 Gram(s) IV Intermittent every 8 hours  chlorhexidine 0.12% Liquid 15 milliLiter(s) Oral Mucosa every 12 hours  chlorhexidine 4% Liquid 1 Application(s) Topical daily  diazepam    Tablet 5 milliGRAM(s) Oral three times a day  insulin lispro (ADMELOG) corrective regimen sliding scale   SubCutaneous every 6 hours  methadone   Solution 5 milliGRAM(s) Oral every 8 hours  metoprolol tartrate 50 milliGRAM(s) Oral two times a day  pantoprazole  Injectable 40 milliGRAM(s) IV Push daily  potassium chloride  10 mEq/100 mL IVPB 10 milliEquivalent(s) IV Intermittent every 1 hour    MEDICATIONS  (PRN):  acetaminophen    Suspension .. 650 milliGRAM(s) Oral every 6 hours PRN Temp greater or equal to 38C (100.4F)      LABS:                        7.2    13.86 )-----------( 238      ( 01 Jan 2021 04:32 )             26.0     01-01    146<H>  |  103  |  15  ----------------------------<  83  3.9   |  31  |  0.54    Ca    8.4      01 Jan 2021 04:32  Mg     1.5     01-01    TPro  6.5  /  Alb  2.3<L>  /  TBili  0.5  /  DBili  x   /  AST  21  /  ALT  8   /  AlkPhos  81  01-01              MICROBIOLOGY:     RADIOLOGY:  [ ] Reviewed and interpreted by me    PULMONARY FUNCTION TESTS:    EKG:

## 2021-01-02 NOTE — PROGRESS NOTE ADULT - SUBJECTIVE AND OBJECTIVE BOX
Thoracic Surgery  CC: chronic hypoxemic respiratory failure  HPI: 60F h/o obesity, AARON admitted in November for COVID-19 pnuemonia and subsequent respiratory failure and prolonged intubation   24/Overnight events: Patient seen at bedside, stable. She is now POD 2 from flexible bronchoscopy and tracheostomy exchange. 8Fr Bivona tracheostomy in place; she is receiving appropriate tidal volumes with adequate gas exhange. Tracheal site is dry, clean and without signs of infection.           Objective:  Vital Signs Last 24 Hrs  T(C): 37.6 (02 Jan 2021 05:00), Max: 38.7 (01 Jan 2021 13:18)  T(F): 99.7 (02 Jan 2021 05:00), Max: 101.6 (01 Jan 2021 13:18)  HR: 84 (02 Jan 2021 06:19) (84 - 115)  BP: 136/61 (02 Jan 2021 05:00) (126/57 - 137/57)  BP(mean): --  RR: 20 (02 Jan 2021 05:00) (20 - 34)  SpO2: 93% (02 Jan 2021 06:19) (92% - 98%)    Physical Exam:  General: remains opening eyes spontaneously, but does not appear to have any purposeful movement, does not follow commands  Respiratory: unable to wean from mechanical ventilator, although with minimal ventilatory settings.     MEDICATIONS  (STANDING):  amLODIPine   Tablet 5 milliGRAM(s) Oral daily  cefTAZidime  IVPB 2 Gram(s) IV Intermittent every 8 hours  chlorhexidine 0.12% Liquid 15 milliLiter(s) Oral Mucosa every 12 hours  chlorhexidine 4% Liquid 1 Application(s) Topical daily  diazepam    Tablet 5 milliGRAM(s) Oral three times a day  insulin lispro (ADMELOG) corrective regimen sliding scale   SubCutaneous every 6 hours  methadone   Solution 5 milliGRAM(s) Oral every 8 hours  metoprolol tartrate 50 milliGRAM(s) Oral two times a day  pantoprazole  Injectable 40 milliGRAM(s) IV Push daily  potassium chloride  10 mEq/100 mL IVPB 10 milliEquivalent(s) IV Intermittent every 1 hour    MEDICATIONS  (PRN):  acetaminophen    Suspension .. 650 milliGRAM(s) Oral every 6 hours PRN Temp greater or equal to 38C (100.4F)    I&O's Detail    01 Jan 2021 07:01  -  02 Jan 2021 07:00  --------------------------------------------------------  IN:    Enteral Tube Flush: 160 mL    Free Water: 1250 mL    Glucerna 1.5: 450 mL    IV PiggyBack: 70 mL  Total IN: 1930 mL    OUT:    Indwelling Catheter - Urethral (mL): 1600 mL    Rectal Tube (mL): 100 mL  Total OUT: 1700 mL    Total NET: 230 mL         LABS:                        7.2    13.86 )-----------( 238      ( 01 Jan 2021 04:32 )             26.0     01-01    146<H>  |  103  |  15  ----------------------------<  83  3.9   |  31  |  0.54    Ca    8.4      01 Jan 2021 04:32  Mg     1.5     01-01    TPro  6.5  /  Alb  2.3<L>  /  TBili  0.5  /  DBili  x   /  AST  21  /  ALT  8   /  AlkPhos  81  01-01          RADIOLOGY & ADDITIONAL STUDIES:

## 2021-01-02 NOTE — PROGRESS NOTE ADULT - ATTENDING COMMENTS
Patient seen and examined at bedside.   60F presenting to [facility] for hypoxemic respiratory failure with ARDS 2/2 COVID19 PNA further c/b superimposed MSSA cavitary PNA and ARF requiring HD. Pt with failure to wean from mechanical ventilation, s/p tracheostomy placement 12/18 and transfer to RCU 12/28.     1. Neuro - encephalopathy, opens eyes, no purposeful movements. Remains off all IV sedation since 12/24, remains on Methadone and diazepam for vent synchrony. CT Head (-) 12/12.  EEG (-). May consider possible MRI once more stable.    2. Pulm - acute respiratory failure, s/p trach placement (CTSx, 12/18 Proximal 7XLT), mechanical vent dependence and difficulty weaning.   Taken to OR by CTS on 12/31 and trach upsized. c/w SBTs as tolerated, airway clearance therapy and trach care     3. ID - MSSA bacteremia, cavitary PNA s/p completion of Abx with Primaxin on 12/28.   Recurrent fevers and leukocytosis, growing resistant Stenotrophomonas in sputum. Levofloxacin switched to Ceftazidime 12/29, plan for 7 day course through 1/5. If pt continues to spike fevers despite change in Abx regimen, may need repeat CT Chest.   Appreciate ID follow up    4. GI- dysphagia - tolerating feeds via NGT, pt unable to get PEG given large ventral hernia. Poor candidate for surgical open PEG placement at this time. Bowel regimen and PPI.     5. Renal - initially ARF on CRRT, following by iHD (last session 12/17) with renal recovery.   Hypernatremia improving, will c/w free H20 via NGT.     6. Endo - DM - NPH and HISS for glucose control.     7. Skin - Wound consult recs appreciated, pt will need Plastic Sx consult for chin wound once more stable.     8. Dispo - full code, prognosis guarded    I was physically present for the key portions of the evaluation and management (E/M) service provided.  I agree with the above history, physical, and plan which I have reviewed and edited where appropriate.     35 minutes spent on total encounter; more than 50% of the visit was spent counseling and/or coordinating care by the attending physician.     Plan discussed with RCU Team.

## 2021-01-02 NOTE — CHART NOTE - NSCHARTNOTEFT_GEN_A_CORE
RN noted TF during suction via trach.  Pt made NPO, TF on hold , Urgent CXR check for NGT placement.  RN aware. No abdomonal distention.  BS+

## 2021-01-02 NOTE — PROGRESS NOTE ADULT - ASSESSMENT
60F morbidly obese, admitted in November for COVID-19 pnuemonia and subsequent respiratory failure and prolonged intubation       - Patient seen at bedside, stable. Is POD 2 from flexible bronchoscopy and tracheostomy exchange. She now has an 8Fr Bivona tracheostomy; she is receiving appropriate tidal volumes with adequate gas exhange. Tracheal site is dry, clean and without signs of infection.   - There were copious thick, bloody secretions suctioned from the distal trachea and bilateral mainstem bronchi. Would recommend not using the inline suction catheters, as they tend to create friable mucosa in the same spot due to repetition. Would recommend the more flexible / delicate catheters for suctioning.  - Care per primary team     Phillip Locke PGY5  Thoracic Surgery  [id number]

## 2021-01-02 NOTE — PROGRESS NOTE ADULT - ASSESSMENT
This is a 60 year old Female with a PMH of HTN, DVT on Xarelto, peritonitis s/p Oral's procedure and ileostomy (reversed 2011), AARON who presented to [facility] on 11/18  w/ fevers found to have COVID-19, intubated 11/23, course complicated by superimposed PNA and bacteremia with Stenotrophomonas (12/11, completed Levaquin x7 days), MSSA/P. mirablis bacteremia (on Cefazolin, potentially due to urine vs line-associated and MSSA in sputum due to PNA), also with lung abscesses on CT chest on 12/5 , ARTEMIO/ATN requiring CRRT and HD. Now unable to wean from ventilator and transferred from [facility] on 12/10 to offload COVID unit. Unable to wean from ventilator, s/p tracheostmy with ctsx 12/18 planned for PEG 12/24 but unable to perform due to large ventral hernia     # AMS  - Alert & oriented x3 @baseline.   - Pt is able to opens eyes to voice but does not follow any commands. No purposeful movements appreciated.   - c/w Methadone and Valium 5mg TID, wean slowly as tolerated  - Spot EEG - abnormal study- Moderate nonspecific diffuse or multifocal cerebral dysfunction   - Will consider MRI       # Acute hypoxemic respiratory failure 2/2 covid-19 PNA / MSSA Bacteremia/Pneumonia,  - s/p intubated 11/23. Course complicated by MSSA pneumonia, stenotrophomonas, p. mirabalis, and lung abscess on CT chest from 12/5.   - Pt has been unable to wean from ventilator, s/p Trached by CTSx on 12/18. Now with significant positional air leak from tracheotomy    - Plan for CTSx to remove trach sutures on 1/1/21  - Pt noted to have large leak. Discussed plan for possible trach upsize with CTSx.   - s/p Imipenem (12/16- 12/28) 500mg q12h x 2 weeks for cavitation and MSSA,  - Will switch Levaquin to Ceftazidime starting today (12/29 - ...) for 7 days course (due to resistance to Levaquin) until 1/5/2021  - To OR today with THoracic- and trach change to 8Fr Bivona w/cannula at 13.5cm  - Some bleeding noted when suctioned     # Hypernatremia  - Na 155>>157>>152, s/p D5W 50cc/hr for few hours, and Free water [id number]  - Will increase free water to 450 q 4 - and d'shagufta D5W for now  - Will monitor -Na+ 149 today - continue free water   - Na improving , free water lowered 1/1/21 Na 146     # Hx of DVT (on Xarelto at home)  - s/p heparin gtt in Surg B, but was stopped due to low H/H requiring transfusion of PRBC    - c/w home metoprolol and Amlodipine to better monitor Blood pressure    - CTA negative for PE  - Lovenox restarted give one dose - npo after MN for trach exchange some bleeding from trach - lovenox on hold     # ARTEMIO 2/2 ATN   - s/p CRRT, and Bumex and intermittent HD.   - Last HD 12/17, Bumex gtt d/c'ed. Now with good UOP   - c/w Rivera for now, will consider TOV in the near future  - Srict I’s and O’s    # Diet  - Pt is not a candidate for Percutaneous Gastrostomy due to large ventral hernia as per Dr. Cruz  - General surgery to evaluate for Possible Open Gastrostomy tube  - c/w NGT feeding for now, tolerating well  - c/w bowel regimen  - c/w NPH q6hr, and ISS - Monitor FS routinely  - FS accucheck <100 past 24 h -Stop NPH and monitor - still controlled off NPH for now     #HTN   -amlodipine /metorpolol   - Monitor bp with parameters     #Dispo   - RCU   - PT/OT - dc from skilled PT services at this time 2/2 unable to participate    This is a 60 year old Female with a PMH of HTN, DVT on Xarelto, peritonitis s/p Oral's procedure and ileostomy (reversed 2011), AARON who presented to [facility] on 11/18  w/ fevers found to have COVID-19, intubated 11/23, course complicated by superimposed PNA and bacteremia with Stenotrophomonas (12/11, completed Levaquin x7 days), MSSA/P. mirablis bacteremia (on Cefazolin, potentially due to urine vs line-associated and MSSA in sputum due to PNA), also with lung abscesses on CT chest on 12/5 , ARTEMIO/ATN requiring CRRT and HD. Now unable to wean from ventilator and transferred from [facility] on 12/10 to offload COVID unit. Unable to wean from ventilator, s/p tracheostmy with ctsx 12/18 planned for PEG 12/24 but unable to perform due to large ventral hernia     # AMS  - Alert & oriented x3 @baseline.   - Pt is able to opens eyes to voice but does not follow any commands. No purposeful movements appreciated.   - c/w Methadone and Valium 5mg TID, wean slowly as tolerated  - Spot EEG - abnormal study- Moderate nonspecific diffuse or multifocal cerebral dysfunction   - Will consider MRI       # Acute hypoxemic respiratory failure 2/2 covid-19 PNA / MSSA Bacteremia/Pneumonia,  - s/p intubated 11/23. Course complicated by MSSA pneumonia, stenotrophomonas, p. mirabalis, and lung abscess on CT chest from 12/5.   - Pt has been unable to wean from ventilator, s/p Trached by CTSx on 12/18. Now with significant positional air leak from tracheotomy    - Plan for CTSx to remove trach sutures on 1/1/21  - Pt noted to have large leak. Discussed plan for possible trach upsize with CTSx.   - s/p Imipenem (12/16- 12/28) 500mg q12h x 2 weeks for cavitation and MSSA,  - Will switch Levaquin to Ceftazidime starting today (12/29 - ...) for 7 days course (due to resistance to Levaquin) until 1/5/2021  - To OR today with THoracic- and trach change to 8Fr Bivona w/cannula at 13.5cm  - Some bleeding noted when suctioned but improved     # Hypernatremia  - Na 155>>157>>152, s/p D5W 50cc/hr for few hours, and Free water [id number]  - Will increase free water to 450 q 4 - and d'shagufta D5W for now  - Will monitor -Na+ 149 today - continue free water   - Na improving , free water lowered 1/1/21 Na 146 1/2/2021 - 143      # Hx of DVT (on Xarelto at home)  - s/p heparin gtt in Surg B, but was stopped due to low H/H requiring transfusion of PRBC    - c/w home metoprolol and Amlodipine to better monitor Blood pressure    - CTA negative for PE  - Lovenox restarted give one dose - npo after MN for trach exchange some bleeding from trach - lovenox on hold     # ARTEMIO 2/2 ATN   - s/p CRRT, and Bumex and intermittent HD.   - Last HD 12/17, Bumex gtt d/c'ed. Now with good UOP   - c/w Rivera for now, will consider TOV in the near future  - Strict I’s and O’s    # Diet  - Pt is not a candidate for Percutaneous Gastrostomy due to large ventral hernia as per Dr. Cruz  - General surgery to evaluate for Possible Open Gastrostomy tube  - c/w NGT feeding for now, tolerating well  - c/w bowel regimen  - c/w NPH q6hr, and ISS - Monitor FS routinely  - FS accucheck <100 past 24 h -Stop NPH and monitor - still controlled off NPH for now     #HTN   -amlodipine /metorpolol   - Monitor bp with parameters     #Dispo   - RCU   - PT/OT - dc from skilled PT services at this time 2/2 unable to participate

## 2021-01-03 LAB
ALBUMIN SERPL ELPH-MCNC: 2.4 G/DL — LOW (ref 3.3–5)
ALP SERPL-CCNC: 76 U/L — SIGNIFICANT CHANGE UP (ref 40–120)
ALT FLD-CCNC: 6 U/L — SIGNIFICANT CHANGE UP (ref 4–33)
ANION GAP SERPL CALC-SCNC: 9 MMOL/L — SIGNIFICANT CHANGE UP (ref 7–14)
AST SERPL-CCNC: 13 U/L — SIGNIFICANT CHANGE UP (ref 4–32)
BILIRUB SERPL-MCNC: 0.5 MG/DL — SIGNIFICANT CHANGE UP (ref 0.2–1.2)
BUN SERPL-MCNC: 11 MG/DL — SIGNIFICANT CHANGE UP (ref 7–23)
CALCIUM SERPL-MCNC: 7.9 MG/DL — LOW (ref 8.4–10.5)
CHLORIDE SERPL-SCNC: 100 MMOL/L — SIGNIFICANT CHANGE UP (ref 98–107)
CO2 SERPL-SCNC: 33 MMOL/L — HIGH (ref 22–31)
CREAT SERPL-MCNC: 0.39 MG/DL — LOW (ref 0.5–1.3)
GLUCOSE BLDC GLUCOMTR-MCNC: 103 MG/DL — HIGH (ref 70–99)
GLUCOSE BLDC GLUCOMTR-MCNC: 114 MG/DL — HIGH (ref 70–99)
GLUCOSE BLDC GLUCOMTR-MCNC: 115 MG/DL — HIGH (ref 70–99)
GLUCOSE BLDC GLUCOMTR-MCNC: 142 MG/DL — HIGH (ref 70–99)
GLUCOSE SERPL-MCNC: 100 MG/DL — HIGH (ref 70–99)
HCT VFR BLD CALC: 28.3 % — LOW (ref 34.5–45)
HGB BLD-MCNC: 8 G/DL — LOW (ref 11.5–15.5)
MAGNESIUM SERPL-MCNC: 1.5 MG/DL — LOW (ref 1.6–2.6)
MCHC RBC-ENTMCNC: 28.3 GM/DL — LOW (ref 32–36)
MCHC RBC-ENTMCNC: 29 PG — SIGNIFICANT CHANGE UP (ref 27–34)
MCV RBC AUTO: 102.5 FL — HIGH (ref 80–100)
NRBC # BLD: 0 /100 WBCS — SIGNIFICANT CHANGE UP
NRBC # FLD: 0.06 K/UL — HIGH
PLATELET # BLD AUTO: 300 K/UL — SIGNIFICANT CHANGE UP (ref 150–400)
POTASSIUM SERPL-MCNC: 2.8 MMOL/L — CRITICAL LOW (ref 3.5–5.3)
POTASSIUM SERPL-SCNC: 2.8 MMOL/L — CRITICAL LOW (ref 3.5–5.3)
PROT SERPL-MCNC: 6.1 G/DL — SIGNIFICANT CHANGE UP (ref 6–8.3)
RBC # BLD: 2.76 M/UL — LOW (ref 3.8–5.2)
RBC # FLD: 19.6 % — HIGH (ref 10.3–14.5)
SODIUM SERPL-SCNC: 142 MMOL/L — SIGNIFICANT CHANGE UP (ref 135–145)
WBC # BLD: 11.79 K/UL — HIGH (ref 3.8–10.5)
WBC # FLD AUTO: 11.79 K/UL — HIGH (ref 3.8–10.5)

## 2021-01-03 PROCEDURE — 99233 SBSQ HOSP IP/OBS HIGH 50: CPT | Mod: GC

## 2021-01-03 RX ORDER — LACTOBACILLUS ACIDOPHILUS 100MM CELL
1 CAPSULE ORAL
Refills: 0 | Status: DISCONTINUED | OUTPATIENT
Start: 2021-01-03 | End: 2021-03-05

## 2021-01-03 RX ORDER — MAGNESIUM SULFATE 500 MG/ML
2 VIAL (ML) INJECTION ONCE
Refills: 0 | Status: COMPLETED | OUTPATIENT
Start: 2021-01-03 | End: 2021-01-03

## 2021-01-03 RX ORDER — METHADONE HYDROCHLORIDE 40 MG/1
5 TABLET ORAL EVERY 12 HOURS
Refills: 0 | Status: DISCONTINUED | OUTPATIENT
Start: 2021-01-03 | End: 2021-01-05

## 2021-01-03 RX ORDER — DIAZEPAM 5 MG
5 TABLET ORAL EVERY 12 HOURS
Refills: 0 | Status: DISCONTINUED | OUTPATIENT
Start: 2021-01-03 | End: 2021-01-06

## 2021-01-03 RX ORDER — POTASSIUM CHLORIDE 20 MEQ
10 PACKET (EA) ORAL
Refills: 0 | Status: COMPLETED | OUTPATIENT
Start: 2021-01-03 | End: 2021-01-03

## 2021-01-03 RX ADMIN — Medication 50 GRAM(S): at 08:39

## 2021-01-03 RX ADMIN — CHLORHEXIDINE GLUCONATE 15 MILLILITER(S): 213 SOLUTION TOPICAL at 05:39

## 2021-01-03 RX ADMIN — Medication 100 MILLIEQUIVALENT(S): at 09:04

## 2021-01-03 RX ADMIN — METHADONE HYDROCHLORIDE 5 MILLIGRAM(S): 40 TABLET ORAL at 05:38

## 2021-01-03 RX ADMIN — CHLORHEXIDINE GLUCONATE 1 APPLICATION(S): 213 SOLUTION TOPICAL at 11:44

## 2021-01-03 RX ADMIN — Medication 50 MILLIGRAM(S): at 17:48

## 2021-01-03 RX ADMIN — CEFTAZIDIME 100 GRAM(S): 6 INJECTION, POWDER, FOR SOLUTION INTRAVENOUS at 02:09

## 2021-01-03 RX ADMIN — Medication 5 MILLIGRAM(S): at 05:38

## 2021-01-03 RX ADMIN — CHLORHEXIDINE GLUCONATE 15 MILLILITER(S): 213 SOLUTION TOPICAL at 17:47

## 2021-01-03 RX ADMIN — Medication 100 MILLIEQUIVALENT(S): at 11:43

## 2021-01-03 RX ADMIN — METHADONE HYDROCHLORIDE 5 MILLIGRAM(S): 40 TABLET ORAL at 17:49

## 2021-01-03 RX ADMIN — Medication 1 TABLET(S): at 17:53

## 2021-01-03 RX ADMIN — Medication 50 MILLIGRAM(S): at 05:39

## 2021-01-03 RX ADMIN — Medication 5 MILLIGRAM(S): at 17:47

## 2021-01-03 RX ADMIN — CEFTAZIDIME 100 GRAM(S): 6 INJECTION, POWDER, FOR SOLUTION INTRAVENOUS at 17:47

## 2021-01-03 RX ADMIN — PANTOPRAZOLE SODIUM 40 MILLIGRAM(S): 20 TABLET, DELAYED RELEASE ORAL at 11:44

## 2021-01-03 RX ADMIN — CEFTAZIDIME 100 GRAM(S): 6 INJECTION, POWDER, FOR SOLUTION INTRAVENOUS at 10:38

## 2021-01-03 RX ADMIN — Medication 100 MILLIEQUIVALENT(S): at 10:39

## 2021-01-03 RX ADMIN — AMLODIPINE BESYLATE 5 MILLIGRAM(S): 2.5 TABLET ORAL at 05:38

## 2021-01-03 NOTE — PROGRESS NOTE ADULT - ASSESSMENT
This is a 60 year old Female with a PMH of HTN, DVT on Xarelto, peritonitis s/p Roal's procedure and ileostomy (reversed 2011), AARON who presented to [facility] on 11/18  w/ fevers found to have COVID-19, intubated 11/23, course complicated by superimposed PNA and bacteremia with Stenotrophomonas (12/11, completed Levaquin x7 days), MSSA/P. mirablis bacteremia (on Cefazolin, potentially due to urine vs line-associated and MSSA in sputum due to PNA), also with lung abscesses on CT chest on 12/5 , ARTEMIO/ATN requiring CRRT and HD. Now unable to wean from ventilator and transferred from [facility] on 12/10 to offload COVID unit. Unable to wean from ventilator, s/p tracheostmy with ctsx 12/18 planned for PEG 12/24 but unable to perform due to large ventral hernia     # AMS  - Alert & oriented x3 @baseline.   - Pt is able to opens eyes to voice but does not follow any commands. No purposeful movements appreciated.   - c/w Methadone and Valium 5mg TID, wean slowly as tolerated  - Spot EEG - abnormal study- Moderate nonspecific diffuse or multifocal cerebral dysfunction   - Will consider MRI       # Acute hypoxemic respiratory failure 2/2 covid-19 PNA / MSSA Bacteremia/Pneumonia,  - s/p intubated 11/23. Course complicated by MSSA pneumonia, stenotrophomonas, p. mirabalis, and lung abscess on CT chest from 12/5.   - Pt has been unable to wean from ventilator, s/p Trached by CTSx on 12/18. Now with significant positional air leak from tracheotomy    - Plan for CTSx to remove trach sutures on 1/1/21  - Pt noted to have large leak. Discussed plan for possible trach upsize with CTSx.   - s/p Imipenem (12/16- 12/28) 500mg q12h x 2 weeks for cavitation and MSSA,  - Will switch Levaquin to Ceftazidime starting today (12/29 - ...) for 7 days course (due to resistance to Levaquin) until 1/5/2021  - To OR today with THoracic- and trach change to 8Fr Bivona w/cannula at 13.5cm  - Some bleeding noted when suctioned but improved     # Hypernatremia  - Na 155>>157>>152, s/p D5W 50cc/hr for few hours, and Free water [id number]  - Will increase free water to 450 q 4 - and d'shagufta D5W for now  - Will monitor -Na+ 149 today - continue free water   - Na improving , free water lowered 1/1/21 Na 146 1/2/2021 - 143      # Hx of DVT (on Xarelto at home)  - s/p heparin gtt in Surg B, but was stopped due to low H/H requiring transfusion of PRBC    - c/w home metoprolol and Amlodipine to better monitor Blood pressure    - CTA negative for PE  - Lovenox restarted give one dose - npo after MN for trach exchange some bleeding from trach - lovenox on hold     # ARTEMIO 2/2 ATN   - s/p CRRT, and Bumex and intermittent HD.   - Last HD 12/17, Bumex gtt d/c'ed. Now with good UOP   - c/w Rivera for now, will consider TOV in the near future  - Strict I’s and O’s    # Diet  - Pt is not a candidate for Percutaneous Gastrostomy due to large ventral hernia as per Dr. Cruz  - General surgery to evaluate for Possible Open Gastrostomy tube  - c/w NGT feeding for now, tolerating well  - c/w bowel regimen  - c/w NPH q6hr, and ISS - Monitor FS routinely  - FS accucheck <100 past 24 h -Stop NPH and monitor - still controlled off NPH for now     #HTN   -amlodipine /metorpolol   - Monitor bp with parameters     #Dispo   - RCU   - PT/OT - dc from skilled PT services at this time 2/2 unable to participate    This is a 60 year old Female with a PMH of HTN, DVT on Xarelto, peritonitis s/p Oral's procedure and ileostomy (reversed 2011), AARON who presented to [facility] on 11/18  w/ fevers found to have COVID-19, intubated 11/23, course complicated by superimposed PNA and bacteremia with Stenotrophomonas (12/11, completed Levaquin x7 days), MSSA/P. mirablis bacteremia (on Cefazolin, potentially due to urine vs line-associated and MSSA in sputum due to PNA), also with lung abscesses on CT chest on 12/5 , ARTEMIO/ATN requiring CRRT and HD. Now unable to wean from ventilator and transferred from [facility] on 12/10 to offload COVID unit. Unable to wean from ventilator, s/p tracheostmy with ctsx 12/18 planned for PEG 12/24 but unable to perform due to large ventral hernia     # AMS  - Alert & oriented x3 @baseline.   - Pt is able to opens eyes to voice but does not follow any commands. No purposeful movements appreciated.   - c/w Methadone and Valium 5mg TID, wean slowly as tolerated  - Spot EEG - abnormal study- Moderate nonspecific diffuse or multifocal cerebral dysfunction   - Will consider MRI   - Methadone and Valium decreased to bid today       # Acute hypoxemic respiratory failure 2/2 covid-19 PNA / MSSA Bacteremia/Pneumonia,  - s/p intubated 11/23. Course complicated by MSSA pneumonia, stenotrophomonas, p. mirabalis, and lung abscess on CT chest from 12/5.   - Pt has been unable to wean from ventilator, s/p Trached by CTSx on 12/18. Now with significant positional air leak from tracheotomy    - Plan for CTSx to remove trach sutures on 1/1/21  - Pt noted to have large leak. Discussed plan for possible trach upsize with CTSx.   - s/p Imipenem (12/16- 12/28) 500mg q12h x 2 weeks for cavitation and MSSA,  - Will switch Levaquin to Ceftazidime starting today (12/29 - ...) for 7 days course (due to resistance to Levaquin) until 1/5/2021  - To OR today with THoracic- and trach change to 8Fr Bivona w/cannula at 13.5cm  - Some bleeding noted when suctioned but improved - still bloody secretions tody AC on hold . Sequentials ordered     # Hypernatremia  - Na 155>>157>>152, s/p D5W 50cc/hr for few hours, and Free water [id number]  - Will increase free water to 450 q 4 - and d'shagufta D5W for now  - Will monitor -Na+ 149 today - continue free water   - Na improving , free water lowered 1/1/21 Na 146 1/2/2021 - 143   - Sodium normalized      # Hx of DVT (on Xarelto at home)  - s/p heparin gtt in Surg B, but was stopped due to low H/H requiring transfusion of PRBC    - c/w home metoprolol and Amlodipine to better monitor Blood pressure    - CTA negative for PE  - Lovenox restarted give one dose - npo after MN for trach exchange some bleeding from trach - lovenox on hold Sequentials ordered     # ARTEMIO 2/2 ATN   - s/p CRRT, and Bumex and intermittent HD.   - Last HD 12/17, Bumex gtt d/c'ed. Now with good UOP   - c/w Rivera for now, will consider TOV in the near future  - Strict I’s and O’s    # Diet  - Pt is not a candidate for Percutaneous Gastrostomy due to large ventral hernia as per Dr. Cruz  - General surgery to evaluate for Possible Open Gastrostomy tube  - c/w NGT feeding for now, tolerating well  - c/w bowel regimen  - c/w NPH q6hr, and ISS - Monitor FS routinely  - FS accucheck <100 past 24 h -Stop NPH and monitor - still controlled off NPH for now     #HTN   -amlodipine /metorpolol   - Monitor bp with parameters     #Dispo   - RCU   - PT/OT - dc from skilled PT services at this time 2/2 unable to participate    This is a 60 year old Female with a PMH of HTN, DVT on Xarelto, peritonitis s/p Oral's procedure and ileostomy (reversed 2011), AARON who presented to [facility] on 11/18  w/ fevers found to have COVID-19, intubated 11/23, course complicated by superimposed PNA and bacteremia with Stenotrophomonas (12/11, completed Levaquin x7 days), MSSA/P. mirablis bacteremia (on Cefazolin, potentially due to urine vs line-associated and MSSA in sputum due to PNA), also with lung abscesses on CT chest on 12/5 , ARTEMIO/ATN requiring CRRT and HD. Now unable to wean from ventilator and transferred from [facility] on 12/10 to offload COVID unit. Unable to wean from ventilator, s/p tracheostmy with ctsx 12/18 planned for PEG 12/24 but unable to perform due to large ventral hernia     # AMS  - Alert & oriented x3 @baseline.   - Pt is able to opens eyes to voice but does not follow any commands. No purposeful movements appreciated.   - c/w Methadone and Valium 5mg TID, wean slowly as tolerated  - Spot EEG - abnormal study- Moderate nonspecific diffuse or multifocal cerebral dysfunction   - Will consider MRI   - Methadone and Valium decreased to bid today       # Acute hypoxemic respiratory failure 2/2 covid-19 PNA / MSSA Bacteremia/Pneumonia,  - s/p intubated 11/23. Course complicated by MSSA pneumonia, stenotrophomonas, p. mirabalis, and lung abscess on CT chest from 12/5.   - Pt has been unable to wean from ventilator, s/p Trached by CTSx on 12/18. Now with significant positional air leak from tracheotomy    - Plan for CTSx to remove trach sutures on 1/1/21  - Pt noted to have large leak. Discussed plan for possible trach upsize with CTSx.   - s/p Imipenem (12/16- 12/28) 500mg q12h x 2 weeks for cavitation and MSSA,  - Will switch Levaquin to Ceftazidime starting today (12/29 - ...) for 7 days course (due to resistance to Levaquin) until 1/5/2021  - To OR today with THoracic- and trach change to 8Fr Bivona w/cannula at 13.5cm  - Some bleeding noted when suctioned but improved - still bloody secretions tody AC on hold . Sequentials ordered     # Hypernatremia  - Na 155>>157>>152, s/p D5W 50cc/hr for few hours, and Free water [id number]  - Will increase free water to 450 q 4 - and d'shagufta D5W for now  - Will monitor -Na+ 149 today - continue free water   - Na improving , free water lowered 1/1/21 Na 146 1/2/2021 - 143   - Sodium normalized      # Hx of DVT (on Xarelto at home)  - s/p heparin gtt in Surg B, but was stopped due to low H/H requiring transfusion of PRBC    - c/w home metoprolol and Amlodipine to better monitor Blood pressure    - CTA negative for PE  - Lovenox restarted give one dose - npo after MN for trach exchange some bleeding from trach - lovenox on hold Sequentials ordered     # ARTEMIO 2/2 ATN   - s/p CRRT, and Bumex and intermittent HD.   - Last HD 12/17, Bumex gtt d/c'ed. Now with good UOP   - c/w Rivera for now, will consider TOV in the near future  - Strict I’s and O’s    # Diet  - Pt is not a candidate for Percutaneous Gastrostomy due to large ventral hernia as per Dr. Cruz  - General surgery to evaluate for Possible Open Gastrostomy tube  - c/w NGT feeding for now, tolerating well  - c/w bowel regimen  - c/w NPH q6hr, and ISS - Monitor FS routinely  - FS accucheck <100 past 24 h -Stop NPH and monitor - still controlled off NPH for now     #HTN   -amlodipine /metorpolol   - Monitor bp with parameters     #Pressure Ulcer  Plastic surgery for chin necrosis when more stable   - Continue wound recs for now     #Dispo   - RCU   - PT/OT - dc from skilled PT services at this time 2/2 unable to participate

## 2021-01-03 NOTE — PROGRESS NOTE ADULT - SUBJECTIVE AND OBJECTIVE BOX
CHIEF COMPLAINT: Patient is a 60y old  Female who presents with a chief complaint of Transfer from [facility] (02 Jan 2021 07:00)      Interval Events: Pt seen and evaluated by team at bedside.      REVIEW OF SYSTEMS:  [ ] Unable to assess ROS because ________    OBJECTIVE:  ICU Vital Signs Last 24 Hrs  T(C): 37.1 (03 Jan 2021 05:35), Max: 37.6 (02 Jan 2021 22:29)  T(F): 98.8 (03 Jan 2021 05:35), Max: 99.6 (02 Jan 2021 22:29)  HR: 78 (03 Jan 2021 06:39) (78 - 104)  BP: 136/58 (03 Jan 2021 05:35) (123/51 - 138/64)  BP(mean): --  ABP: --  ABP(mean): --  RR: 20 (03 Jan 2021 05:35) (20 - 29)  SpO2: 96% (03 Jan 2021 06:39) (91% - 97%)    Mode: AC/ CMV (Assist Control/ Continuous Mandatory Ventilation), RR (machine): 12, FiO2: 40, PEEP: 5, ITime: 11, MAP: 15.4, PC: 25, PIP: 35    01-02 @ 07:01  -  01-03 @ 07:00  --------------------------------------------------------  IN: 810 mL / OUT: 600 mL / NET: 210 mL      CAPILLARY BLOOD GLUCOSE      POCT Blood Glucose.: 115 mg/dL (03 Jan 2021 05:17)        HOSPITAL MEDICATIONS:  MEDICATIONS  (STANDING):  amLODIPine   Tablet 5 milliGRAM(s) Oral daily  cefTAZidime  IVPB 2 Gram(s) IV Intermittent every 8 hours  chlorhexidine 0.12% Liquid 15 milliLiter(s) Oral Mucosa every 12 hours  chlorhexidine 4% Liquid 1 Application(s) Topical daily  diazepam    Tablet 5 milliGRAM(s) Oral three times a day  insulin lispro (ADMELOG) corrective regimen sliding scale   SubCutaneous every 6 hours  methadone   Solution 5 milliGRAM(s) Oral every 8 hours  metoprolol tartrate 50 milliGRAM(s) Oral two times a day  pantoprazole  Injectable 40 milliGRAM(s) IV Push daily  potassium chloride  10 mEq/100 mL IVPB 10 milliEquivalent(s) IV Intermittent every 1 hour    MEDICATIONS  (PRN):  acetaminophen    Suspension .. 650 milliGRAM(s) Oral every 6 hours PRN Temp greater or equal to 38C (100.4F)      LABS:                        8.0    14.78 )-----------( 281      ( 02 Jan 2021 08:50 )             28.8     01-02    143  |  101  |  11  ----------------------------<  92  3.9   |  31  |  0.39<L>    Ca    7.9<L>      02 Jan 2021 07:11  Mg     1.4     01-02    TPro  6.5  /  Alb  2.2<L>  /  TBili  0.6  /  DBili  x   /  AST  14  /  ALT  8   /  AlkPhos  82  01-02              MICROBIOLOGY:     RADIOLOGY:  [ ] Reviewed and interpreted by me    PULMONARY FUNCTION TESTS:    EKG: CHIEF COMPLAINT: Patient is a 60y old  Female who presents with a chief complaint of Transfer from [facility] (02 Jan 2021 07:00)      Interval Events: Pt seen and evaluated by team at bedside.  Still with blood secretions around trach site/stoma    REVIEW OF SYSTEMS:  [x ] Unable to assess ROS because ____AMS____    OBJECTIVE:  ICU Vital Signs Last 24 Hrs  T(C): 37.1 (03 Jan 2021 05:35), Max: 37.6 (02 Jan 2021 22:29)  T(F): 98.8 (03 Jan 2021 05:35), Max: 99.6 (02 Jan 2021 22:29)  HR: 78 (03 Jan 2021 06:39) (78 - 104)  BP: 136/58 (03 Jan 2021 05:35) (123/51 - 138/64)  BP(mean): --  ABP: --  ABP(mean): --  RR: 20 (03 Jan 2021 05:35) (20 - 29)  SpO2: 96% (03 Jan 2021 06:39) (91% - 97%)    Mode: AC/ CMV (Assist Control/ Continuous Mandatory Ventilation), RR (machine): 12, FiO2: 40, PEEP: 5, ITime: 11, MAP: 15.4, PC: 25, PIP: 35    01-02 @ 07:01  -  01-03 @ 07:00  --------------------------------------------------------  IN: 810 mL / OUT: 600 mL / NET: 210 mL      CAPILLARY BLOOD GLUCOSE      POCT Blood Glucose.: 115 mg/dL (03 Jan 2021 05:17)        HOSPITAL MEDICATIONS:  MEDICATIONS  (STANDING):  amLODIPine   Tablet 5 milliGRAM(s) Oral daily  cefTAZidime  IVPB 2 Gram(s) IV Intermittent every 8 hours  chlorhexidine 0.12% Liquid 15 milliLiter(s) Oral Mucosa every 12 hours  chlorhexidine 4% Liquid 1 Application(s) Topical daily  diazepam    Tablet 5 milliGRAM(s) Oral three times a day  insulin lispro (ADMELOG) corrective regimen sliding scale   SubCutaneous every 6 hours  methadone   Solution 5 milliGRAM(s) Oral every 8 hours  metoprolol tartrate 50 milliGRAM(s) Oral two times a day  pantoprazole  Injectable 40 milliGRAM(s) IV Push daily  potassium chloride  10 mEq/100 mL IVPB 10 milliEquivalent(s) IV Intermittent every 1 hour    MEDICATIONS  (PRN):  acetaminophen    Suspension .. 650 milliGRAM(s) Oral every 6 hours PRN Temp greater or equal to 38C (100.4F)      LABS:                        8.0    14.78 )-----------( 281      ( 02 Jan 2021 08:50 )             28.8     01-02    143  |  101  |  11  ----------------------------<  92  3.9   |  31  |  0.39<L>    Ca    7.9<L>      02 Jan 2021 07:11  Mg     1.4     01-02    TPro  6.5  /  Alb  2.2<L>  /  TBili  0.6  /  DBili  x   /  AST  14  /  ALT  8   /  AlkPhos  82  01-02              MICROBIOLOGY:     RADIOLOGY:  [ ] Reviewed and interpreted by me    PULMONARY FUNCTION TESTS:    EKG:

## 2021-01-03 NOTE — PROGRESS NOTE ADULT - ATTENDING COMMENTS
Patient seen and examined at bedside.   60F presenting to [facility] for hypoxemic respiratory failure with ARDS 2/2 COVID19 PNA further c/b superimposed MSSA cavitary PNA and ARF requiring HD. Pt with failure to wean from mechanical ventilation, s/p tracheostomy placement 12/18 and transfer to RCU 12/28.     1. Neuro - encephalopathy, opens eyes, no purposeful movements. Remains off all IV sedation since 12/24, remains on Methadone and diazepam for vent synchrony. CT Head (-) 12/12.  EEG (-). May consider possible MRI once more stable.    2. Pulm - acute respiratory failure, s/p trach placement (CTSx, 12/18 Proximal 7XLT), mechanical vent dependence and difficulty weaning.   Taken to OR by CTS on 12/31 and trach upsized. c/w SBTs as tolerated, airway clearance therapy and trach care- bloody secretions around trach stoma, continue to pack     3. ID - MSSA bacteremia, cavitary PNA s/p completion of Abx with Primaxin on 12/28.   Recurrent fevers and leukocytosis, growing resistant Stenotrophomonas in sputum. Levofloxacin switched to Ceftazidime 12/29, plan for 7 day course through 1/5. If pt continues to spike fevers despite change in Abx regimen, may need repeat CT Chest.   Appreciate ID follow up    4. GI- dysphagia - tolerating feeds via NGT, pt unable to get PEG given large ventral hernia. Poor candidate for surgical open PEG placement at this time. Bowel regimen and PPI.     5. Renal - initially ARF on CRRT, following by iHD (last session 12/17) with renal recovery.   Hypernatremia improving, will c/w free H20 via NGT.     6. Endo - DM - NPH and HISS for glucose control.     7. Skin - Wound consult recs appreciated, pt will need Plastic Sx consult for chin wound once more stable.     8. Dispo - full code, prognosis guarded  Restart DVT ppx - Lovenox    I was physically present for the key portions of the evaluation and management (E/M) service provided.  I agree with the above history, physical, and plan which I have reviewed and edited where appropriate.     35 minutes spent on total encounter; more than 50% of the visit was spent counseling and/or coordinating care by the attending physician.     Plan discussed with RCU Team. Patient seen and examined at bedside.   60F presenting to [facility] for hypoxemic respiratory failure with ARDS 2/2 COVID19 PNA further c/b superimposed MSSA cavitary PNA and ARF requiring HD. Pt with failure to wean from mechanical ventilation, s/p tracheostomy placement 12/18 and transfer to RCU 12/28.     1. Neuro - encephalopathy, opens eyes, no purposeful movements. Remains off all IV sedation since 12/24, remains on Methadone and diazepam for vent synchrony. CT Head (-) 12/12.  EEG (-). May consider possible MRI once more stable.  -Reduce Methadone to 5 mg BID.    2. Pulm - acute respiratory failure, s/p trach placement (CTSx, 12/18 Proximal 7XLT), mechanical vent dependence and difficulty weaning.   Taken to OR by CTS on 12/31 and trach upsized. c/w SBTs as tolerated, airway clearance therapy and trach care- bloody secretions around trach stoma, continue to pack     3. ID - MSSA bacteremia, cavitary PNA s/p completion of Abx with Primaxin on 12/28.   Recurrent fevers and leukocytosis, growing resistant Stenotrophomonas in sputum. Levofloxacin switched to Ceftazidime 12/29, plan for 7 day course through 1/5. If pt continues to spike fevers despite change in Abx regimen, may need repeat CT Chest.   Appreciate ID follow up    4. GI- dysphagia - tolerating feeds via NGT, pt unable to get PEG given large ventral hernia. Poor candidate for surgical open PEG placement at this time. Bowel regimen and PPI.     5. Renal - initially ARF on CRRT, following by iHD (last session 12/17) with renal recovery.   Hypernatremia improving, will c/w free H20 via NGT.     6. Endo - DM - NPH and HISS for glucose control.     7. Skin - Wound consult recs appreciated, pt will need Plastic Sx consult for chin wound once more stable.     8. Heme - macrocytic anemia - check Vit B12/folate  Restart DVT ppx - Lovenox    9. Dispo - full code, prognosis guarded    I was physically present for the key portions of the evaluation and management (E/M) service provided.  I agree with the above history, physical, and plan which I have reviewed and edited where appropriate.     35 minutes spent on total encounter; more than 50% of the visit was spent counseling and/or coordinating care by the attending physician.     Plan discussed with RCU Team.

## 2021-01-04 LAB
ALBUMIN SERPL ELPH-MCNC: 2.2 G/DL — LOW (ref 3.3–5)
ALP SERPL-CCNC: 75 U/L — SIGNIFICANT CHANGE UP (ref 40–120)
ALT FLD-CCNC: 6 U/L — SIGNIFICANT CHANGE UP (ref 4–33)
ANION GAP SERPL CALC-SCNC: 6 MMOL/L — LOW (ref 7–14)
AST SERPL-CCNC: 18 U/L — SIGNIFICANT CHANGE UP (ref 4–32)
BILIRUB SERPL-MCNC: 0.4 MG/DL — SIGNIFICANT CHANGE UP (ref 0.2–1.2)
BUN SERPL-MCNC: 10 MG/DL — SIGNIFICANT CHANGE UP (ref 7–23)
CALCIUM SERPL-MCNC: 7.9 MG/DL — LOW (ref 8.4–10.5)
CHLORIDE SERPL-SCNC: 102 MMOL/L — SIGNIFICANT CHANGE UP (ref 98–107)
CO2 SERPL-SCNC: 35 MMOL/L — HIGH (ref 22–31)
CREAT SERPL-MCNC: 0.32 MG/DL — LOW (ref 0.5–1.3)
CULTURE RESULTS: SIGNIFICANT CHANGE UP
CULTURE RESULTS: SIGNIFICANT CHANGE UP
FOLATE SERPL-MCNC: 7.6 NG/ML — SIGNIFICANT CHANGE UP (ref 3.1–17.5)
GLUCOSE BLDC GLUCOMTR-MCNC: 108 MG/DL — HIGH (ref 70–99)
GLUCOSE BLDC GLUCOMTR-MCNC: 110 MG/DL — HIGH (ref 70–99)
GLUCOSE BLDC GLUCOMTR-MCNC: 135 MG/DL — HIGH (ref 70–99)
GLUCOSE BLDC GLUCOMTR-MCNC: 150 MG/DL — HIGH (ref 70–99)
GLUCOSE SERPL-MCNC: 136 MG/DL — HIGH (ref 70–99)
HCT VFR BLD CALC: 26.1 % — LOW (ref 34.5–45)
HGB BLD-MCNC: 7.7 G/DL — LOW (ref 11.5–15.5)
MAGNESIUM SERPL-MCNC: 1.6 MG/DL — SIGNIFICANT CHANGE UP (ref 1.6–2.6)
MCHC RBC-ENTMCNC: 29.3 PG — SIGNIFICANT CHANGE UP (ref 27–34)
MCHC RBC-ENTMCNC: 29.5 GM/DL — LOW (ref 32–36)
MCV RBC AUTO: 99.2 FL — SIGNIFICANT CHANGE UP (ref 80–100)
NRBC # BLD: 0 /100 WBCS — SIGNIFICANT CHANGE UP
NRBC # FLD: 0.11 K/UL — HIGH
PLATELET # BLD AUTO: 320 K/UL — SIGNIFICANT CHANGE UP (ref 150–400)
POTASSIUM SERPL-MCNC: 3.3 MMOL/L — LOW (ref 3.5–5.3)
POTASSIUM SERPL-SCNC: 3.3 MMOL/L — LOW (ref 3.5–5.3)
PROT SERPL-MCNC: 6.4 G/DL — SIGNIFICANT CHANGE UP (ref 6–8.3)
RBC # BLD: 2.63 M/UL — LOW (ref 3.8–5.2)
RBC # FLD: 19.4 % — HIGH (ref 10.3–14.5)
SODIUM SERPL-SCNC: 143 MMOL/L — SIGNIFICANT CHANGE UP (ref 135–145)
SPECIMEN SOURCE: SIGNIFICANT CHANGE UP
SPECIMEN SOURCE: SIGNIFICANT CHANGE UP
VIT B12 SERPL-MCNC: 745 PG/ML — SIGNIFICANT CHANGE UP (ref 200–900)
WBC # BLD: 13.51 K/UL — HIGH (ref 3.8–10.5)
WBC # FLD AUTO: 13.51 K/UL — HIGH (ref 3.8–10.5)

## 2021-01-04 PROCEDURE — 99232 SBSQ HOSP IP/OBS MODERATE 35: CPT

## 2021-01-04 PROCEDURE — 99233 SBSQ HOSP IP/OBS HIGH 50: CPT | Mod: GC

## 2021-01-04 RX ORDER — POTASSIUM CHLORIDE 20 MEQ
40 PACKET (EA) ORAL ONCE
Refills: 0 | Status: COMPLETED | OUTPATIENT
Start: 2021-01-04 | End: 2021-01-04

## 2021-01-04 RX ADMIN — Medication 50 MILLIGRAM(S): at 05:03

## 2021-01-04 RX ADMIN — CHLORHEXIDINE GLUCONATE 1 APPLICATION(S): 213 SOLUTION TOPICAL at 11:06

## 2021-01-04 RX ADMIN — Medication 40 MILLIEQUIVALENT(S): at 14:54

## 2021-01-04 RX ADMIN — Medication 1 TABLET(S): at 05:03

## 2021-01-04 RX ADMIN — Medication 5 MILLIGRAM(S): at 17:37

## 2021-01-04 RX ADMIN — Medication 5 MILLIGRAM(S): at 05:03

## 2021-01-04 RX ADMIN — CHLORHEXIDINE GLUCONATE 15 MILLILITER(S): 213 SOLUTION TOPICAL at 05:03

## 2021-01-04 RX ADMIN — PANTOPRAZOLE SODIUM 40 MILLIGRAM(S): 20 TABLET, DELAYED RELEASE ORAL at 11:56

## 2021-01-04 RX ADMIN — CEFTAZIDIME 100 GRAM(S): 6 INJECTION, POWDER, FOR SOLUTION INTRAVENOUS at 03:00

## 2021-01-04 RX ADMIN — METHADONE HYDROCHLORIDE 5 MILLIGRAM(S): 40 TABLET ORAL at 05:03

## 2021-01-04 RX ADMIN — Medication 50 MILLIGRAM(S): at 17:38

## 2021-01-04 RX ADMIN — METHADONE HYDROCHLORIDE 5 MILLIGRAM(S): 40 TABLET ORAL at 17:38

## 2021-01-04 RX ADMIN — CEFTAZIDIME 100 GRAM(S): 6 INJECTION, POWDER, FOR SOLUTION INTRAVENOUS at 17:37

## 2021-01-04 RX ADMIN — CHLORHEXIDINE GLUCONATE 15 MILLILITER(S): 213 SOLUTION TOPICAL at 17:37

## 2021-01-04 RX ADMIN — Medication 1 TABLET(S): at 17:37

## 2021-01-04 RX ADMIN — CEFTAZIDIME 100 GRAM(S): 6 INJECTION, POWDER, FOR SOLUTION INTRAVENOUS at 11:06

## 2021-01-04 RX ADMIN — AMLODIPINE BESYLATE 5 MILLIGRAM(S): 2.5 TABLET ORAL at 05:03

## 2021-01-04 NOTE — PROGRESS NOTE ADULT - ASSESSMENT
This is a 60 year old Female with a PMH of HTN, DVT on Xarelto, peritonitis s/p Oral's procedure and ileostomy (reversed 2011), AARON who presented to [facility] on 11/18  w/ fevers found to have COVID-19, intubated 11/23, course complicated by superimposed PNA and bacteremia with Stenotrophomonas (12/11, completed Levaquin x7 days), MSSA/P. mirablis bacteremia (on Cefazolin, potentially due to urine vs line-associated and MSSA in sputum due to PNA), also with lung abscesses on CT chest on 12/5 , ARTEMIO/ATN requiring CRRT and HD. Now unable to wean from ventilator and transferred from [facility] on 12/10 to offload COVID unit. Unable to wean from ventilator, s/p tracheostmy with ctsx 12/18 planned for PEG 12/24 but unable to perform due to large ventral hernia     # AMS  - Alert & oriented x3 @baseline.   - Pt is able to opens eyes to voice but does not follow any commands. No purposeful movements appreciated.   - c/w Methadone and Valium 5mg TID, wean slowly as tolerated  - Spot EEG - abnormal study- Moderate nonspecific diffuse or multifocal cerebral dysfunction   - Will consider MRI   - Methadone and Valium decreased to bid    # Acute hypoxemic respiratory failure 2/2 covid-19 PNA / MSSA Bacteremia/Pneumonia,  - s/p intubated 11/23. Course complicated by MSSA pneumonia, stenotrophomonas, p. mirabalis, and lung abscess on CT chest from 12/5.   - Pt has been unable to wean from ventilator, s/p Trached by CTSx on 12/18. Now with significant positional air leak from tracheotomy    - Plan for CTSx to remove trach sutures on 1/1/21  - Pt noted to have large leak. Discussed plan for possible trach upsize with CTSx.   - s/p Imipenem (12/16- 12/28) 500mg q12h x 2 weeks for cavitation and MSSA,  - Will switch Levaquin to Ceftazidime starting today (12/29 - ...) for 7 days course (due to resistance to Levaquin) until 1/5/2021  - To OR today with THoracic- and trach change to 8Fr Bivona w/cannula at 13.5cm  - Some bleeding noted when suctioned but improved - still bloody secretions tody AC on hold . Sequentials ordered     # Hypernatremia  - Na 155>>157>>152, s/p D5W 50cc/hr for few hours, and Free water [id number]  - Will increase free water to 450 q 4 - and d'shagufta D5W for now  - Will monitor -Na+ 149 today - continue free water   - Na improving , free water lowered 1/1/21 Na 146 1/2/2021 - 143   - Sodium normalized      # Hx of DVT (on Xarelto at home)  - s/p heparin gtt in Surg B, but was stopped due to low H/H requiring transfusion of PRBC    - c/w home metoprolol and Amlodipine to better monitor Blood pressure    - CTA negative for PE  - Lovenox restarted give one dose - npo after MN for trach exchange some bleeding from trach - lovenox on hold Sequentials ordered     # ARTEMIO 2/2 ATN   - s/p CRRT, and Bumex and intermittent HD.   - Last HD 12/17, Bumex gtt d/c'ed. Now with good UOP   - c/w Rivera for now, will consider TOV in the near future  - Strict I’s and O’s    # Diet  - Pt is not a candidate for Percutaneous Gastrostomy due to large ventral hernia as per Dr. Cruz  - General surgery to evaluate for Possible Open Gastrostomy tube  - c/w NGT feeding for now, tolerating well  - c/w bowel regimen  - c/w NPH q6hr, and ISS - Monitor FS routinely  - FS accucheck <100 past 24 h -Stop NPH and monitor - still controlled off NPH for now     #HTN   -amlodipine /metorpolol   - Monitor bp with parameters     #Pressure Ulcer  Plastic surgery for chin necrosis when more stable   - Continue wound recs for now     #Dispo   - RCU   - PT/OT - dc from skilled PT services at this time 2/2 unable to participate    This is a 60 year old Female with a PMH of HTN, DVT on Xarelto, peritonitis s/p Oral's procedure and ileostomy (reversed 2011), AARON who presented to [facility] on 11/18  w/ fevers found to have COVID-19, intubated 11/23, course complicated by superimposed PNA and bacteremia with Stenotrophomonas (12/11, completed Levaquin x7 days), MSSA/P. mirablis bacteremia (on Cefazolin, potentially due to urine vs line-associated and MSSA in sputum due to PNA), also with lung abscesses on CT chest on 12/5 , ARTEMIO/ATN requiring CRRT and HD. Now unable to wean from ventilator and transferred from [facility] on 12/10 to offload COVID unit. Unable to wean from ventilator, s/p tracheostmy with ctsx 12/18 planned for PEG 12/24 but unable to perform due to large ventral hernia     # AMS  - Alert & oriented x3 @baseline.   - Pt is able to opens eyes to voice but does not follow any commands. No purposeful movements appreciated.   - c/w Methadone and Valium 5mg TID, wean slowly as tolerated  - Spot EEG - abnormal study- Moderate nonspecific diffuse or multifocal cerebral dysfunction   - Will consider MRI   - Methadone and Valium decreased to bid 1/3; wean again tomorrow 1/5 as able.     # Acute hypoxemic respiratory failure 2/2 covid-19 PNA / MSSA Bacteremia/Pneumonia,  - s/p intubated 11/23. Course complicated by MSSA pneumonia, stenotrophomonas, p. mirabalis, and lung abscess on CT chest from 12/5.   - Pt has been unable to wean from ventilator, s/p Trached by CTSx on 12/18. Now with significant positional air leak from tracheotomy    - Plan for CTSx to remove trach sutures on 1/1/21  - Pt noted to have large leak. Discussed plan for possible trach upsize with CTSx.   - s/p Imipenem (12/16- 12/28) 500mg q12h x 2 weeks for cavitation and MSSA,  - Will switch Levaquin to Ceftazidime starting today (12/29 - ...) for 7 days course (due to resistance to Levaquin) until 1/5/2021  - To OR today with THoracic- and trach change to 8Fr Bivona w/cannula at 13.5cm  - Some bleeding noted when suctioned but improved - still bloody secretions tody AC on hold . Sequentials ordered     # Hypernatremia  - Na 155>>157>>152, s/p D5W 50cc/hr for few hours, and Free water [id number]  - Will increase free water to 450 q 4 - and d'shagufta D5W for now  - Will monitor -Na+ 149 today - continue free water   - Na improving , free water lowered 1/1/21 Na 146 1/2/2021 - 143   - Sodium normalized   - Plan for TOV tonight 1/4     # Hx of DVT (on Xarelto at home)  - s/p heparin gtt in Surg B, but was stopped due to low H/H requiring transfusion of PRBC    - c/w home metoprolol and Amlodipine to better monitor Blood pressure    - CTA negative for PE  - Lovenox restarted give one dose - npo after MN for trach exchange some bleeding from trach - lovenox on hold Sequentials ordered     # ARTEMIO 2/2 ATN   - s/p CRRT, and Bumex and intermittent HD.   - Last HD 12/17, Bumex gtt d/c'ed. Now with good UOP   - c/w Rivera for now, will consider TOV in the near future  - Strict I’s and O’s    # Diet  - Pt is not a candidate for Percutaneous Gastrostomy due to large ventral hernia as per Dr. Cruz  - General surgery to evaluate for Possible Open Gastrostomy tube  - c/w NGT feeding for now, tolerating well  - c/w bowel regimen  - c/w NPH q6hr, and ISS - Monitor FS routinely  - FS accucheck <100 past 24 h -Stop NPH and monitor - still controlled off NPH for now   - IR consulted for PEG placement as per surgery will not be able to go to OR as pt has ventral hernias making her high risk.    #HTN   -amlodipine /metorpolol   - Monitor bp with parameters     #Pressure Ulcer  - Plastic surgery for chin necrosis when more stable   - Continue wound recs for now     #Dispo   - RCU   - PT/OT - dc from skilled PT services at this time 2/2 unable to participate    This is a 60 year old Female with a PMH of HTN, DVT on Xarelto, peritonitis s/p Oral's procedure and ileostomy (reversed 2011), AARON who presented to [facility] on 11/18  w/ fevers found to have COVID-19, intubated 11/23, course complicated by superimposed PNA and bacteremia with Stenotrophomonas (12/11, completed Levaquin x7 days), MSSA/P. mirablis bacteremia (on Cefazolin, potentially due to urine vs line-associated and MSSA in sputum due to PNA), also with lung abscesses on CT chest on 12/5 , ARTEMIO/ATN requiring CRRT and HD. Now unable to wean from ventilator and transferred from [facility] on 12/10 to offload COVID unit. Unable to wean from ventilator, s/p tracheostmy with ctsx 12/18 planned for PEG 12/24 but unable to perform due to large ventral hernia     # AMS  - Alert & oriented x3 @baseline.   - Pt is able to opens eyes to voice but does not follow any commands. No purposeful movements appreciated.   - c/w Methadone and Valium 5mg TID, wean slowly as tolerated  - Spot EEG - abnormal study- Moderate nonspecific diffuse or multifocal cerebral dysfunction   - Will consider MRI   - Methadone and Valium decreased to bid 1/3; wean again tomorrow 1/5 as able.     # Acute hypoxemic respiratory failure 2/2 covid-19 PNA / MSSA Bacteremia/Pneumonia,  - s/p intubated 11/23. Course complicated by MSSA pneumonia, stenotrophomonas, p. mirabalis, and lung abscess on CT chest from 12/5.   - Pt has been unable to wean from ventilator, s/p Trached by CTSx on 12/18. Now with significant positional air leak from tracheotomy    - Plan for CTSx to remove trach sutures on 1/1/21  - Pt noted to have large leak. Discussed plan for possible trach upsize with CTSx.   - s/p Imipenem (12/16- 12/28) 500mg q12h x 2 weeks for cavitation and MSSA,  - Will switch Levaquin to Ceftazidime starting today (12/29 - ...) for 7 days course (due to resistance to Levaquin) until 1/5/2021  - To OR today with THoracic- and trach change to 8Fr Bivona w/cannula at 13.5cm  - Some bleeding noted when suctioned but improved - still bloody secretions tody AC on hold . Sequentials ordered     # Hypernatremia  - Na 155>>157>>152, s/p D5W 50cc/hr for few hours, and Free water [id number]  - Will increase free water to 450 q 4 - and d'shagufta D5W for now  - Will monitor -Na+ 149 today - continue free water   - Na improving , free water lowered 1/1/21 Na 146 1/2/2021 - 143   - Sodium normalized   - Plan for TOV tonight 1/4    #Hypokalemia  - 2.8 on 1/3 and repleted  - Monitor K+ and replete as needed     # Hx of DVT (on Xarelto at home)  - s/p heparin gtt in Surg B, but was stopped due to low H/H requiring transfusion of PRBC    - c/w home metoprolol and Amlodipine to better monitor Blood pressure    - CTA negative for PE  - Lovenox restarted give one dose - npo after MN for trach exchange some bleeding from trach - lovenox on hold Sequentials ordered     # ARTEMIO 2/2 ATN   - s/p CRRT, and Bumex and intermittent HD.   - Last HD 12/17, Bumex gtt d/c'ed. Now with good UOP   - c/w Rivera for now, will consider TOV in the near future  - Strict I’s and O’s    # Diet  - Pt is not a candidate for Percutaneous Gastrostomy due to large ventral hernia as per Dr. Cruz  - General surgery to evaluate for Possible Open Gastrostomy tube  - c/w NGT feeding for now, tolerating well  - c/w bowel regimen  - c/w NPH q6hr, and ISS - Monitor FS routinely  - FS accucheck <100 past 24 h -Stop NPH and monitor - still controlled off NPH for now   - IR consulted for PEG placement as per surgery will not be able to go to OR as pt has ventral hernias making her high risk.    #HTN   -amlodipine /metorpolol   - Monitor bp with parameters     #Pressure Ulcer  - Plastic surgery for chin necrosis when more stable   - Continue wound recs for now     #Dispo   - RCU   - PT/OT - dc from skilled PT services at this time 2/2 unable to participate

## 2021-01-04 NOTE — CONSULT NOTE ADULT - TIME-BASE BILLING FOR NON-FACE-TO-FACE CONSULT (MINUTES)
"ED Provider Note    CHIEF COMPLAINT  Chief Complaint   Patient presents with   • Leg Pain     c/o of lower leg pain and swelling starting from the anklet to the knee. pt reports \"i can barely walk\".        HPI  Serenity Howe is a 72 y.o. female who presents for evaluation of ankle pain.  The triage note states leg pain however she states that her symptoms are really in her ankle.  She has some slight discomfort extending up and Achilles region posteriorly.  She denies any trauma.  She has had no fevers.  When asked if she is ever had this pain before she states yes and that she has a history of gout.  In reviewing her records she was here approximately 1 month ago for an episode of gout.  She was treated with Indocin at that time and states that she got completely better.  She has not followed up with her primary care provider Dr. Johnson.  She describes the pain is severe with any movement or attempted ambulation.  She has had previous x-rays that demonstrate arthritis.    REVIEW OF SYSTEMS  See HPI for further details. All other systems negative.    PAST MEDICAL HISTORY  Past Medical History:   Diagnosis Date   • ASTHMA    • Breast cancer (HCC)    • History of breast cancer 7/29/2015   • Hypertension    • Shoulder pain, right 7/29/2015   • Tremor 7/29/2015       FAMILY HISTORY  Family History   Problem Relation Age of Onset   • Hyperlipidemia Mother        SOCIAL HISTORY  Social History     Socioeconomic History   • Marital status:      Spouse name: Not on file   • Number of children: Not on file   • Years of education: Not on file   • Highest education level: Not on file   Occupational History   • Not on file   Social Needs   • Financial resource strain: Not on file   • Food insecurity     Worry: Not on file     Inability: Not on file   • Transportation needs     Medical: Not on file     Non-medical: Not on file   Tobacco Use   • Smoking status: Never Smoker   • Smokeless tobacco: Never Used "   Substance and Sexual Activity   • Alcohol use: No     Alcohol/week: 0.0 oz   • Drug use: No   • Sexual activity: Never     Partners: Male   Lifestyle   • Physical activity     Days per week: Not on file     Minutes per session: Not on file   • Stress: Not on file   Relationships   • Social connections     Talks on phone: Not on file     Gets together: Not on file     Attends Hoahaoism service: Not on file     Active member of club or organization: Not on file     Attends meetings of clubs or organizations: Not on file     Relationship status: Not on file   • Intimate partner violence     Fear of current or ex partner: Not on file     Emotionally abused: Not on file     Physically abused: Not on file     Forced sexual activity: Not on file   Other Topics Concern   • Not on file   Social History Narrative   • Not on file       SURGICAL HISTORY  Past Surgical History:   Procedure Laterality Date   • GYN SURGERY      cs   • MASTECTOMY      right        CURRENT MEDICATIONS  Home Medications     Reviewed by Cassandra Lowry R.N. (Registered Nurse) on 08/15/20 at 1113  Med List Status: Not Addressed   Medication Last Dose Status   acetaminophen (TYLENOL) 325 MG Tab  Active   albuterol (PROVENTIL) 2.5mg/3ml Nebu Soln solution for nebulization  Active   albuterol 108 (90 Base) MCG/ACT Aero Soln inhalation aerosol  Active   aspirin (ASA) 81 MG CHEW  Active   Calcium Carbonate-Vit D-Min (CALTRATE PLUS PO)  Active   fluticasone (FLONASE) 50 MCG/ACT nasal spray  Active   indomethacin SR (INDOCIN SR) 75 MG Cap CR  Active   meloxicam (MOBIC) 7.5 MG Tab  Active   miconazole (MICOTIN) 2 % Cream  Active   omeprazole (PRILOSEC) 20 MG delayed-release capsule  Active   valsartan-hydrochlorothiazide (DIOVAN-HCT) 80-12.5 MG per tablet  Active   verapamil ER (CALAN SR) 120 MG CAPSULE SR 24 HR  Active   WIXELA INHUB 250-50 MCG/DOSE AEROSOL POWDER, BREATH ACTIVATED  Active                ALLERGIES  Allergies   Allergen Reactions   •  "Shellfish Allergy Hives       PHYSICAL EXAM  VITAL SIGNS: /74   Pulse 85   Temp 36.7 °C (98.1 °F) (Temporal)   Resp 14   Ht 1.575 m (5' 2\")   SpO2 91%   Breastfeeding No   BMI 30.91 kg/m²   Constitutional: Well developed, Well nourished,  Non-toxic appearance.   Cardiovascular: Normal heart rate.   Thorax & Lungs: No respiratory distress.  Skin: Warm, Dry.  Musculoskeletal: Left lower extremity shows no obvious deformity.  She has full range of motion of the knee.  There is no calf pain or swelling.  She does appear to have some slight swelling and erythema to the ankle region mostly laterally.  She has some tenderness to touch and she does have pain with range of motion of the ankle.  No tenderness to palpation of the Achilles or the foot.  The foot appears to be neurovascularly intact.  Neurologic: Awake and alert, No focal deficits noted.       COURSE & MEDICAL DECISION MAKING  Pertinent Labs & Imaging studies reviewed. (See chart for details)  This is a 72-year-old here for evaluation of left ankle pain.  Based on her history and presentation today I believe this represents another flareup of her gout.  I do not suspect DVT or an infectious process at this time.  I do not think x-rays would be of any benefit.  I also do not think laboratories would be of any benefit.  I will provide the patient a prescription for prednisone as well as Norco.  I reviewed her prescription monitoring report and she will sign a consent for treatment with narcotics.  I stressed that she must follow-up with Dr. Johnson her primary care provider as I suspect she will benefit from suppressive therapy with the legs of allopurinol.  She is given a discharge instruction sheet on gout.    FINAL IMPRESSION  1.  Gouty arthritis of the left ankle  2.   3.         Electronically signed by: Jason Liu M.D., 8/15/2020 11:52 AM    " 5-10

## 2021-01-04 NOTE — PROGRESS NOTE ADULT - ATTENDING COMMENTS
Patient seen and examined at bedside.    60 F here with acute hypoxemic respiratory failure / ARDS 2/2 COVID19 PNA further c/b superimposed MSSA cavitary PNA and ARF requiring HD. Pt with failure to wean from mechanical ventilation, s/p tracheostomy placement 12/18 and transfer to RCU 12/28.     1. Neuro - encephalopathy, remains off all IV sedation since 12/24, remains on methadone and diazepam. CT Head (-) 12/12.  EEG (-) 12/29. Mental status is improved (can nod yes to questions). Gradually wean off PO sedation.    2. Pulm - acute respiratory failure, s/p trach placement (CTSx, 12/18 Proximal 7XLT and then 12/31 8Fr Bivona), mechanical vent dependence and difficulty weaning. Continue with SBTs as tolerated, airway clearance therapy and trach care - still having bloody secretions around trach stoma, continue to pack.     3. ID - MSSA bacteremia, cavitary PNA s/p completion of Abx with Primaxin on 12/28  Recurrent fevers and leukocytosis, growing resistant Stenotrophomonas in sputum. Levofloxacin switched to Ceftazidime 12/29, plan for 7 day course through 1/5. If pt continues to spike fevers despite change in Abx regimen, may need repeat CT Chest. Appreciate ID follow up    4. GI - oropharayngeal dysphagia - tolerating feeds via NGT, pt unable to get PEG given large ventral hernia. Will touch base with surgery regarding possible PEG placement. Bowel regimen and PPI.     5. Renal - initially ARF on CRRT, following by iHD with renal recovery. Hypernatremia improving, continue free H20 via NGT.    6. Endo - DM - NPH and sliding scale for glucose control.     7. Skin - Wound consult recs appreciated, pt will need Plastic Sx consult for chin wound once more stable.     8. Heme - Previously on A/C for history of VTE. Was on hold for bleeding from tracheostomy site. Will consider restarting PPX dosing today.    9. Dispo - full code, prognosis guarded Patient seen and examined at bedside.    60 F here with acute hypoxemic respiratory failure / ARDS 2/2 COVID19 PNA further c/b superimposed MSSA cavitary PNA and ARF requiring HD. Pt with failure to wean from mechanical ventilation, s/p tracheostomy placement 12/18 and transfer to RCU 12/28.     1. Neuro - encephalopathy, remains off all IV sedation since 12/24, remains on methadone and diazepam. CT Head (-) 12/12.  EEG (-) 12/29. Mental status is improved (can nod yes to questions). Gradually wean off PO sedation.    2. Pulm - acute respiratory failure, s/p trach placement (CTSx, 12/18 Proximal 7XLT and then 12/31 8Fr Bivona), mechanical vent dependence and difficulty weaning. Continue with SBTs as tolerated, airway clearance therapy and trach care - still having bloody secretions around trach stoma, continue to pack.     3. ID - MSSA bacteremia, cavitary PNA s/p completion of Abx with Primaxin on 12/28  Recurrent fevers and leukocytosis, growing resistant Stenotrophomonas in sputum. Levofloxacin switched to Ceftazidime 12/29, plan for 7 day course through 1/5. If pt continues to spike fevers despite change in Abx regimen, may need repeat CT Chest. Appreciate ID follow up    4. GI - oropharayngeal dysphagia - tolerating feeds via NGT, pt unable to get PEG given large ventral hernia. Will touch base with surgery regarding possible PEG placement. Bowel regimen and PPI.     5. Renal - initially ARF on CRRT, following by iHD with renal recovery. Hypernatremia improving, continue free H20 via NGT.    6. Endo - DM - NPH and sliding scale for glucose control.     7. Skin - Wound consult recs appreciated, pt will need Plastic Sx consult for chin wound once more stable.     8. Heme - Previously on A/C for history of VTE. On hold for bleeding from tracheostomy site. SCDs for now.    9. Dispo - full code, prognosis guarded

## 2021-01-04 NOTE — PROGRESS NOTE ADULT - SUBJECTIVE AND OBJECTIVE BOX
Follow Up:      Inverval History/ROS:Patient is a 60y old  Female who presents with a chief complaint of Transfer from [facility] (22 Dec 2020 08:13)     s/p  OR for trach change with CT surgery 12/31.     has been afebrile x 72 h.      Allergies    Kiwi (Unknown)  latex (Anaphylaxis)  latex (Unknown)  penicillin (Other)  penicillin (Unknown)  perfume  hives (Other)  potassium acetate (Other)  soap additives hives (Other)    Intolerances        ANTIMICROBIALS: cefTAZidime  IVPB 2 every 8 hours      MEDICATIONS  (STANDING):  acetaminophen    Suspension .. 650 every 6 hours PRN  amLODIPine   Tablet 5 daily  diazepam    Tablet 5 three times a day  insulin lispro (ADMELOG) corrective regimen sliding scale  every 6 hours  methadone   Solution 5 every 8 hours  metoprolol tartrate 50 two times a day  pantoprazole  Injectable 40 daily    Vital Signs Last 24 Hrs  T(F): 99.3 (01-04-21 @ 09:41), Max: 99.7 (01-03-21 @ 22:25)  HR: 91 (01-04-21 @ 15:23)  BP: 120/55 (01-04-21 @ 09:41)  RR: 21 (01-04-21 @ 09:41)  SpO2: 93% (01-04-21 @ 15:23) (93% - 100%)    PHYSICAL EXAM:  General: [x ] trach, no distress  HEAD/EYES: opens eyes  ENT:  eschar chin, trach, NGT  Cardiovascular:   distant  Respiratory:  clear ant  GI:  [x ] soft, scars, hernia  :  [x ] negro [] no CVA tenderness   Musculoskeletal:  [ ] no synovitis  Neurologic:  unable   Skin:   no rash  Psychiatric:  unable  Lines:  no phlebitis bilat arrow                                     7.7    13.51 )-----------( 320      ( 04 Jan 2021 10:07 )             26.1 01-04    143  |  102  |  10  ----------------------------<  136  3.3   |  35  |  0.32  Ca    7.9      04 Jan 2021 10:07Mg     1.6     01-04  TPro  6.4  /  Alb  2.2  /  TBili  0.4  /  DBili  x   /  AST  18  /  ALT  6   /  AlkPhos  75  01-04          MICROBIOLOGY:  uCulture - Blood (12.27.20 @ 10:22)   Specimen Source: .Blood Blood   Culture Results:   No growth. Culture - Blood (12.27.20 @ 10:22)   Specimen Source: .Blood Blood   Culture Results:   No growth.   culCulture - Blood (12.21.20 @ 23:09)   Specimen Source: .Blood Blood-Venous   Culture Results:   No growth  Culture - Blood (12.21.20 @ 23:09)   Specimen Source: .Blood Blood-Peripheral   Culture Results:   No growth   RADIOLOGY:    rd< from: Xray Chest 1 View-PORTABLE IMMEDIATE (Xray Chest 1 View-PORTABLE IMMEDIATE .) (12.19.20 @ 20:25) >    rd< from: Xray Chest 1 View- PORTABLE-Urgent (Xray Chest 1 View- PORTABLE-Urgent .) (01.02.21 @ 22:42) >    EXAM:  XR CHEST PORTABLE URGENT 1V        PROCEDURE DATE:  Jan 2 2021         INTERPRETATION:  Chest one view    HISTORY: Feeding tube advancement    COMPARISON STUDY: Earlier the same day    Frontal expiratory view of the chest shows the heart dax similar in size. Tracheostomy tube is again noted. Feeding tube is been advanced into the stomach.    The lungs show similar patchy infiltrates, right larger than left. and there is no evidence of pneumothorax nor pleural effusion.    IMPRESSION:  Feeding tube to stomach.    Thank you for the courtesy of this referral.    < end of copied text >

## 2021-01-04 NOTE — PROGRESS NOTE ADULT - SUBJECTIVE AND OBJECTIVE BOX
CHIEF COMPLAINT: Patient is a 60y old  Female who presents with a chief complaint of Transfer from [facility].    Interval Events:    REVIEW OF SYSTEMS:  [ ] All other systems negative  [ ] Unable to assess ROS because ________    OBJECTIVE:  ICU Vital Signs Last 24 Hrs  T(C): 37.5 (04 Jan 2021 05:00), Max: 37.6 (03 Jan 2021 22:25)  T(F): 99.5 (04 Jan 2021 05:00), Max: 99.7 (03 Jan 2021 22:25)  HR: 82 (04 Jan 2021 06:22) (80 - 101)  BP: 149/49 (04 Jan 2021 05:00) (114/45 - 149/49)  BP(mean): 82 (03 Jan 2021 08:36) (82 - 82)  RR: 20 (04 Jan 2021 05:00) (17 - 24)  SpO2: 93% (04 Jan 2021 06:22) (93% - 100%)    Mode: AC/ CMV (Assist Control/ Continuous Mandatory Ventilation), RR (machine): 12, FiO2: 40, PEEP: 5, ITime: 1, MAP: 15.9, PC: 25, PIP: 33    01-03 @ 07:01  -  01-04 @ 07:00  --------------------------------------------------------  IN: 800 mL / OUT: 700 mL / NET: 100 mL      CAPILLARY BLOOD GLUCOSE      POCT Blood Glucose.: 110 mg/dL (04 Jan 2021 05:35)      HOSPITAL MEDICATIONS:  MEDICATIONS  (STANDING):  amLODIPine   Tablet 5 milliGRAM(s) Oral daily  cefTAZidime  IVPB 2 Gram(s) IV Intermittent every 8 hours  chlorhexidine 0.12% Liquid 15 milliLiter(s) Oral Mucosa every 12 hours  chlorhexidine 4% Liquid 1 Application(s) Topical daily  diazepam    Tablet 5 milliGRAM(s) Oral every 12 hours  insulin lispro (ADMELOG) corrective regimen sliding scale   SubCutaneous every 6 hours  lactobacillus acidophilus 1 Tablet(s) Oral two times a day  methadone   Solution 5 milliGRAM(s) Oral every 12 hours  metoprolol tartrate 50 milliGRAM(s) Oral two times a day  pantoprazole  Injectable 40 milliGRAM(s) IV Push daily    MEDICATIONS  (PRN):  acetaminophen    Suspension .. 650 milliGRAM(s) Oral every 6 hours PRN Temp greater or equal to 38C (100.4F)      LABS:                           MICROBIOLOGY:     RADIOLOGY:  [ ] Reviewed and interpreted by me    PULMONARY FUNCTION TESTS:    EKG: CHIEF COMPLAINT: Patient is a 60y old  Female who presents with a chief complaint of Transfer from [facility].    Interval Events: No interval events noted overnight.     REVIEW OF SYSTEMS:  [x] Unable to assess ROS because patient is nonverbal.     OBJECTIVE:  ICU Vital Signs Last 24 Hrs  T(C): 37.5 (04 Jan 2021 05:00), Max: 37.6 (03 Jan 2021 22:25)  T(F): 99.5 (04 Jan 2021 05:00), Max: 99.7 (03 Jan 2021 22:25)  HR: 82 (04 Jan 2021 06:22) (80 - 101)  BP: 149/49 (04 Jan 2021 05:00) (114/45 - 149/49)  BP(mean): 82 (03 Jan 2021 08:36) (82 - 82)  RR: 20 (04 Jan 2021 05:00) (17 - 24)  SpO2: 93% (04 Jan 2021 06:22) (93% - 100%)    Mode: AC/ CMV (Assist Control/ Continuous Mandatory Ventilation), RR (machine): 12, FiO2: 40, PEEP: 5, ITime: 1, MAP: 15.9, PC: 25, PIP: 33    01-03 @ 07:01  -  01-04 @ 07:00  --------------------------------------------------------  IN: 800 mL / OUT: 700 mL / NET: 100 mL      CAPILLARY BLOOD GLUCOSE      POCT Blood Glucose.: 110 mg/dL (04 Jan 2021 05:35)      HOSPITAL MEDICATIONS:  MEDICATIONS  (STANDING):  amLODIPine   Tablet 5 milliGRAM(s) Oral daily  cefTAZidime  IVPB 2 Gram(s) IV Intermittent every 8 hours  chlorhexidine 0.12% Liquid 15 milliLiter(s) Oral Mucosa every 12 hours  chlorhexidine 4% Liquid 1 Application(s) Topical daily  diazepam    Tablet 5 milliGRAM(s) Oral every 12 hours  insulin lispro (ADMELOG) corrective regimen sliding scale   SubCutaneous every 6 hours  lactobacillus acidophilus 1 Tablet(s) Oral two times a day  methadone   Solution 5 milliGRAM(s) Oral every 12 hours  metoprolol tartrate 50 milliGRAM(s) Oral two times a day  pantoprazole  Injectable 40 milliGRAM(s) IV Push daily    MEDICATIONS  (PRN):  acetaminophen    Suspension .. 650 milliGRAM(s) Oral every 6 hours PRN Temp greater or equal to 38C (100.4F)      LABS:                        7.7    13.51 )-----------( 320      ( 04 Jan 2021 10:07 )             26.1     01-04    143  |  102  |  10  ----------------------------<  136<H>  3.3<L>   |  35<H>  |  0.32<L>    Ca    7.9<L>      04 Jan 2021 10:07  Mg     1.6     01-04    TPro  6.4  /  Alb  2.2<L>  /  TBili  0.4  /  DBili  x   /  AST  18  /  ALT  6   /  AlkPhos  75  01-04                         MICROBIOLOGY:     RADIOLOGY:  [ ] Reviewed and interpreted by me    PULMONARY FUNCTION TESTS:    EKG:

## 2021-01-04 NOTE — PROGRESS NOTE ADULT - ASSESSMENT
61 yo woman with AARON, h/o Lockett's procedure, s/p reversal 2011 who was admitted to [facility] 11/17 with covid pneumonia.  She received course of remdesevir and dexamethasone; required intubation 11/23 for acute hypoxemic respiratory failure. Hospital course complicated by Proteus bacteremia, MSSA bacteremia/ pneumonia, and Stenotrophomonas respiratory infection, as well as ATN requiring CVVHD --> HD ---. off dialysis.  CT chest 12/4 new large consolidation with cavitation in right lung.  Patient transferred [facility] 12/10; spiked high fever; antibiotics broadened to vanco x 1 and meropenem 500 mg iv 24h.  Blood culture 11/27 MSSA and proteus; 11/28 MSSA.  Bacteremia cleared.   Blood cultures 12/1, 12/11, 12/21, 12/27 no growth.  Sputum culture 12/1, 12/12, 12/27 Stenotrophomonas.    Multifocal covid pneumonia with respiratory failure, MSSA bacteremia 11/27 with cavitary pneumonia, Proteus bacteremia 11/27, Stenotrophomonas pneumonia.  CxR 12/16- large right lung opacity.  aspiration pneumonia superimposed on ARDS due to covid.   s/p trach 12/18.  12/29 Fever, blood tinged sputum and Stenotrophomonas in sputum.  meropenem changed to ceftazidime.   12/31 trach leak.  s/p OR 12/31 for trach change with CT surgery.    afebrile since 1/1.    suggest:  c/w ceftazidime --> 1/5  trend temp, wbc  if febrile after completing ceftazidime would CT chest    Mila Burgos MD  Pager: [phone]  After 5 PM or weekends please call fellow on call or office [phone]

## 2021-01-04 NOTE — CONSULT NOTE ADULT - SUBJECTIVE AND OBJECTIVE BOX
Vascular & Interventional Radiology Consult Note    Evaluate for Procedure: Gastrostomy tube placement.    HPI: 60 year old Female with a PMH of HTN, DVT on Xarelto, peritonitis s/p Oral's procedure and ileostomy (reversed 2011), AARON who presented to [facility] on 11/18 w/ fevers found to have COVID-19, intubated 11/23, course complicated by superimposed PNA and bacteremia, as well as lung abscesses on CT chest on 12/5, ARTEMIO/ATN requiring CRRT and HD.     Now unable to wean from ventilator and transferred from [facility] on 12/10 to offload COVID unit. Unable to wean from ventilator, s/p tracheostmy with ctsx 12/18 planned for PEG 12/24 but unable to perform due to large ventral hernia. IR consulted for gastrostomy tube placement.        Allergies: latex (Anaphylaxis)  latex (Unknown)  penicillin (Other)  penicillin (Unknown)  perfume  hives (Other)  potassium acetate (Other)  soap additives hives (Other)    Medications (Abx/Cardiac/Anticoagulation/Blood Products)  amLODIPine   Tablet: 5 milliGRAM(s) Oral (01-04 @ 05:03)  cefTAZidime  IVPB: 100 mL/Hr IV Intermittent (01-04 @ 11:06)  metoprolol tartrate: 50 milliGRAM(s) Oral (01-04 @ 05:03)    Data:    T(C): 37.4  HR: 91  BP: 120/55  RR: 21  SpO2: 93%    -WBC 13.51 / HgB 7.7 / Hct 26.1 / Plt 320  -Na 143 / Cl 102 / BUN 10 / Glucose 136  -K 3.3 / CO2 35 / Cr 0.32  -ALT 6 / Alk Phos 75 / T.Bili 0.4  -INR1.18    Imaging: Reviewed CT 12/4/2020    Assessment/Plan:   60 year old Female with COVID-19 c/b superimposed PNA and bacteremia, as well as lung abscesses, unable to wean from ventilator s/p tracheostomy with ctsx 12/18. Unable to perform planned PEG 12/24 due to large ventral hernia. IR consulted for gastrostomy tube placement.    - CT reviewed. Appears technically feasible. However, will defer gastrostomy tube placement until closer to discharge. Please reconsult once disposition determined.  - Will need enteric tube in place for g-tube placement.  - Page with any questions [phone].

## 2021-01-05 LAB
ANION GAP SERPL CALC-SCNC: 11 MMOL/L — SIGNIFICANT CHANGE UP (ref 7–14)
BUN SERPL-MCNC: 9 MG/DL — SIGNIFICANT CHANGE UP (ref 7–23)
CALCIUM SERPL-MCNC: 7.9 MG/DL — LOW (ref 8.4–10.5)
CHLORIDE SERPL-SCNC: 99 MMOL/L — SIGNIFICANT CHANGE UP (ref 98–107)
CO2 SERPL-SCNC: 30 MMOL/L — SIGNIFICANT CHANGE UP (ref 22–31)
CREAT SERPL-MCNC: 0.29 MG/DL — LOW (ref 0.5–1.3)
GLUCOSE BLDC GLUCOMTR-MCNC: 101 MG/DL — HIGH (ref 70–99)
GLUCOSE BLDC GLUCOMTR-MCNC: 122 MG/DL — HIGH (ref 70–99)
GLUCOSE BLDC GLUCOMTR-MCNC: 124 MG/DL — HIGH (ref 70–99)
GLUCOSE BLDC GLUCOMTR-MCNC: 128 MG/DL — HIGH (ref 70–99)
GLUCOSE SERPL-MCNC: 114 MG/DL — HIGH (ref 70–99)
HCT VFR BLD CALC: 27.1 % — LOW (ref 34.5–45)
HGB BLD-MCNC: 7.6 G/DL — LOW (ref 11.5–15.5)
MAGNESIUM SERPL-MCNC: 1.4 MG/DL — LOW (ref 1.6–2.6)
MCHC RBC-ENTMCNC: 28 GM/DL — LOW (ref 32–36)
MCHC RBC-ENTMCNC: 29 PG — SIGNIFICANT CHANGE UP (ref 27–34)
MCV RBC AUTO: 103.4 FL — HIGH (ref 80–100)
NRBC # BLD: 1 /100 WBCS — SIGNIFICANT CHANGE UP
NRBC # FLD: 0.22 K/UL — HIGH
PLATELET # BLD AUTO: 369 K/UL — SIGNIFICANT CHANGE UP (ref 150–400)
POTASSIUM SERPL-MCNC: 3.8 MMOL/L — SIGNIFICANT CHANGE UP (ref 3.5–5.3)
POTASSIUM SERPL-SCNC: 3.8 MMOL/L — SIGNIFICANT CHANGE UP (ref 3.5–5.3)
RBC # BLD: 2.62 M/UL — LOW (ref 3.8–5.2)
RBC # FLD: 19.3 % — HIGH (ref 10.3–14.5)
SODIUM SERPL-SCNC: 140 MMOL/L — SIGNIFICANT CHANGE UP (ref 135–145)
WBC # BLD: 18.31 K/UL — HIGH (ref 3.8–10.5)
WBC # FLD AUTO: 18.31 K/UL — HIGH (ref 3.8–10.5)

## 2021-01-05 PROCEDURE — 99233 SBSQ HOSP IP/OBS HIGH 50: CPT | Mod: GC

## 2021-01-05 RX ORDER — MAGNESIUM SULFATE 500 MG/ML
2 VIAL (ML) INJECTION ONCE
Refills: 0 | Status: COMPLETED | OUTPATIENT
Start: 2021-01-05 | End: 2021-01-05

## 2021-01-05 RX ORDER — CEFTAZIDIME 6 G/30ML
2 INJECTION, POWDER, FOR SOLUTION INTRAVENOUS ONCE
Refills: 0 | Status: COMPLETED | OUTPATIENT
Start: 2021-01-05 | End: 2021-01-05

## 2021-01-05 RX ORDER — CEFTAZIDIME 6 G/30ML
INJECTION, POWDER, FOR SOLUTION INTRAVENOUS
Refills: 0 | Status: COMPLETED | OUTPATIENT
Start: 2021-01-05 | End: 2021-01-06

## 2021-01-05 RX ORDER — CEFTAZIDIME 6 G/30ML
2 INJECTION, POWDER, FOR SOLUTION INTRAVENOUS EVERY 8 HOURS
Refills: 0 | Status: COMPLETED | OUTPATIENT
Start: 2021-01-05 | End: 2021-01-06

## 2021-01-05 RX ORDER — METHADONE HYDROCHLORIDE 40 MG/1
5 TABLET ORAL DAILY
Refills: 0 | Status: DISCONTINUED | OUTPATIENT
Start: 2021-01-06 | End: 2021-01-07

## 2021-01-05 RX ORDER — ENOXAPARIN SODIUM 100 MG/ML
40 INJECTION SUBCUTANEOUS DAILY
Refills: 0 | Status: DISCONTINUED | OUTPATIENT
Start: 2021-01-05 | End: 2021-01-08

## 2021-01-05 RX ORDER — COLLAGENASE CLOSTRIDIUM HIST. 250 UNIT/G
1 OINTMENT (GRAM) TOPICAL
Refills: 0 | Status: DISCONTINUED | OUTPATIENT
Start: 2021-01-05 | End: 2021-03-19

## 2021-01-05 RX ADMIN — Medication 1 TABLET(S): at 05:19

## 2021-01-05 RX ADMIN — Medication 5 MILLIGRAM(S): at 05:19

## 2021-01-05 RX ADMIN — Medication 1 TABLET(S): at 17:14

## 2021-01-05 RX ADMIN — Medication 50 MILLIGRAM(S): at 05:19

## 2021-01-05 RX ADMIN — Medication 5 MILLIGRAM(S): at 17:13

## 2021-01-05 RX ADMIN — Medication 1 APPLICATION(S): at 17:13

## 2021-01-05 RX ADMIN — Medication 50 MILLIGRAM(S): at 17:14

## 2021-01-05 RX ADMIN — METHADONE HYDROCHLORIDE 5 MILLIGRAM(S): 40 TABLET ORAL at 05:19

## 2021-01-05 RX ADMIN — CEFTAZIDIME 100 GRAM(S): 6 INJECTION, POWDER, FOR SOLUTION INTRAVENOUS at 12:32

## 2021-01-05 RX ADMIN — CHLORHEXIDINE GLUCONATE 15 MILLILITER(S): 213 SOLUTION TOPICAL at 05:19

## 2021-01-05 RX ADMIN — CHLORHEXIDINE GLUCONATE 1 APPLICATION(S): 213 SOLUTION TOPICAL at 12:31

## 2021-01-05 RX ADMIN — AMLODIPINE BESYLATE 5 MILLIGRAM(S): 2.5 TABLET ORAL at 05:19

## 2021-01-05 RX ADMIN — CHLORHEXIDINE GLUCONATE 15 MILLILITER(S): 213 SOLUTION TOPICAL at 17:14

## 2021-01-05 RX ADMIN — CEFTAZIDIME 100 GRAM(S): 6 INJECTION, POWDER, FOR SOLUTION INTRAVENOUS at 22:19

## 2021-01-05 RX ADMIN — PANTOPRAZOLE SODIUM 40 MILLIGRAM(S): 20 TABLET, DELAYED RELEASE ORAL at 12:31

## 2021-01-05 RX ADMIN — ENOXAPARIN SODIUM 40 MILLIGRAM(S): 100 INJECTION SUBCUTANEOUS at 12:31

## 2021-01-05 RX ADMIN — Medication 50 GRAM(S): at 09:14

## 2021-01-05 NOTE — PHYSICAL THERAPY INITIAL EVALUATION ADULT - GENERAL OBSERVATIONS, REHAB EVAL
Pt received semisupine in bed, +trached/sedated, in NAD.
Patient found supine in bed head of bed elevated in NAD, agreeable to evaluation +trach to vent
Pt received supine in bed, all lines intact, NAD.

## 2021-01-05 NOTE — PHYSICAL THERAPY INITIAL EVALUATION ADULT - ADDITIONAL COMMENTS
unable to assess
Information obtained via care coordination assessment.   Pt lives with her spouse and 2 kids in private home, pt has to negotiate 14 steps. Pt was independent in all ADL prior to admission. Pt would occasionally use cane prior to admission.
Information obtained via care coordination assessment.     Pt was left semi-supine in bed as found, all lines/tubes intact and call bell within reach, RN aware.

## 2021-01-05 NOTE — PROGRESS NOTE ADULT - ATTENDING COMMENTS
Patient seen and examined at bedside.    60 F here with acute hypoxemic respiratory failure / ARDS 2/2 COVID19 PNA further c/b superimposed MSSA cavitary PNA and ARF requiring HD. Pt with failure to wean from mechanical ventilation, s/p tracheostomy placement 12/18 and transfer to RCU 12/28.     1. Neuro - encephalopathy, remains off all IV sedation since 12/24, remains on methadone and diazepam, which is being tapered. CT Head (-) 12/12.  EEG (-) 12/29. Mental status is improved.    2. Pulm - acute respiratory failure, s/p trach placement (CTSx, 12/18 Proximal 7XLT and then 12/31 8Fr Bivona), mechanical vent dependence and difficulty weaning. Continue with SBTs as tolerated, airway clearance therapy and trach care - still having bloody secretions, which is improved.     3. ID - MSSA bacteremia, cavitary PNA s/p completion of Abx with Primaxin on 12/28. Recurrent fevers and leukocytosis, growing resistant Stenotrophomonas in sputum. Levofloxacin switched to Ceftazidime 12/29 with plan for 7 day course through 1/5 (today).    4. GI - oropharayngeal dysphagia - tolerating feeds via NGT, pt unable to get PEG initially given large ventral hernia. Discussed with surgery and IR. Will plan for IR guided placement prior to discharge. Bowel regimen and PPI.     5. Renal - initially ARF on CRRT, following by intermittent HD with renal recovery. Hypernatremia resolved. Continue free water via NGT.    6. Endo - DM - NPH and sliding scale for glucose control.     7. Skin - Wound consult recs appreciated, will call plastics for chin wound.    8. Heme - Previously on A/C for history of VTE. On hold for bleeding from tracheostomy site. Restart PPX dosing today. If no signs of continued bleeding with plan to restart A/C.    9. Dispo - Full code, prognosis guarded.

## 2021-01-05 NOTE — OCCUPATIONAL THERAPY INITIAL EVALUATION ADULT - ANTICIPATED DISCHARGE DISPOSITION, OT EVAL
Patient is not an appropriate candidate for skilled OT services at this time. Patient unable to follow any commands and actively participate in OT sessions. Patient will not be placed on OT program at this time. Please reconsult this discipline with any changes.
extended care

## 2021-01-05 NOTE — PHYSICAL THERAPY INITIAL EVALUATION ADULT - FOLLOWS COMMANDS/ANSWERS QUESTIONS, REHAB EVAL
unable to follow commands/unable to answer questions
unable to follow commands/unable to answer questions
100% of the time

## 2021-01-05 NOTE — OCCUPATIONAL THERAPY INITIAL EVALUATION ADULT - GENERAL OBSERVATIONS, REHAB EVAL
Patient received semisupine in bed in NAD. +trach to vent. Per RN Karen, patient okay to participate in OT evaluation as tolerated.
Patient is alert and following simple directions.

## 2021-01-05 NOTE — PROGRESS NOTE ADULT - ASSESSMENT
This is a 60 year old Female with a PMH of HTN, DVT on Xarelto, peritonitis s/p Oral's procedure and ileostomy (reversed 2011), AARON who presented to [facility] on 11/18  w/ fevers found to have COVID-19, intubated 11/23, course complicated by superimposed PNA and bacteremia with Stenotrophomonas (12/11, completed Levaquin x7 days), MSSA/P. mirablis bacteremia (on Cefazolin, potentially due to urine vs line-associated and MSSA in sputum due to PNA), also with lung abscesses on CT chest on 12/5 , ARTEMIO/ATN requiring CRRT and HD. Now unable to wean from ventilator and transferred from [facility] on 12/10 to offload COVID unit. Unable to wean from ventilator, s/p tracheostmy with ctsx 12/18 planned for PEG 12/24 but unable to perform due to large ventral hernia     # AMS  - Alert & oriented x3 @baseline.   - Pt is able to opens eyes to voice but does not follow any commands. No purposeful movements appreciated.   - c/w Methadone and Valium 5mg TID, wean slowly as tolerated  - Spot EEG - abnormal study- Moderate nonspecific diffuse or multifocal cerebral dysfunction   - Will consider MRI   - Methadone and Valium decreased to bid 1/3; wean again tomorrow 1/5 as able.     # Acute hypoxemic respiratory failure 2/2 covid-19 PNA / MSSA Bacteremia/Pneumonia,  - s/p intubated 11/23. Course complicated by MSSA pneumonia, stenotrophomonas, p. mirabalis, and lung abscess on CT chest from 12/5.   - Pt has been unable to wean from ventilator, s/p Trached by CTSx on 12/18. Now with significant positional air leak from tracheotomy    - Plan for CTSx to remove trach sutures on 1/1/21  - Pt noted to have large leak. Discussed plan for possible trach upsize with CTSx.   - s/p Imipenem (12/16- 12/28) 500mg q12h x 2 weeks for cavitation and MSSA,  - Will switch Levaquin to Ceftazidime starting today (12/29 - ...) for 7 days course (due to resistance to Levaquin) until 1/5/2021  - To OR today with THoracic- and trach change to 8Fr Bivona w/cannula at 13.5cm  - Some bleeding noted when suctioned but improved - still bloody secretions tody AC on hold . Sequentials ordered     # Hypernatremia  - Na 155>>157>>152, s/p D5W 50cc/hr for few hours, and Free water [id number]  - Will increase free water to 450 q 4 - and d'shagufta D5W for now  - Will monitor -Na+ 149 today - continue free water   - Na improving , free water lowered 1/1/21 Na 146 1/2/2021 - 143   - Sodium normalized   - Plan for TOV tonight 1/4    #Hypokalemia  - 2.8 on 1/3 and repleted  - Monitor K+ and replete as needed     # Hx of DVT (on Xarelto at home)  - s/p heparin gtt in Surg B, but was stopped due to low H/H requiring transfusion of PRBC    - c/w home metoprolol and Amlodipine to better monitor Blood pressure    - CTA negative for PE  - Lovenox restarted give one dose - npo after MN for trach exchange some bleeding from trach - lovenox on hold Sequentials ordered     # ARTEMIO 2/2 ATN   - s/p CRRT, and Bumex and intermittent HD.   - Last HD 12/17, Bumex gtt d/c'ed. Now with good UOP   - c/w Rivera for now, will consider TOV in the near future  - Strict I’s and O’s    # Diet  - Pt is not a candidate for Percutaneous Gastrostomy due to large ventral hernia as per Dr. Cruz  - General surgery to evaluate for Possible Open Gastrostomy tube  - c/w NGT feeding for now, tolerating well  - c/w bowel regimen  - c/w NPH q6hr, and ISS - Monitor FS routinely  - FS accucheck <100 past 24 h -Stop NPH and monitor - still controlled off NPH for now   - IR consulted for PEG placement as per surgery will not be able to go to OR as pt has ventral hernias making her high risk.    #HTN   -amlodipine /metorpolol   - Monitor bp with parameters     #Pressure Ulcer  - Plastic surgery for chin necrosis when more stable   - Continue wound recs for now     #Dispo   - RCU   - PT/OT - dc from skilled PT services at this time 2/2 unable to participate    This is a 60 year old Female with a PMH of HTN, DVT on Xarelto, peritonitis s/p Oral's procedure and ileostomy (reversed 2011), AARON who presented to [facility] on 11/18  w/ fevers found to have COVID-19, intubated 11/23, course complicated by superimposed PNA and bacteremia with Stenotrophomonas (12/11, completed Levaquin x7 days), MSSA/P. mirablis bacteremia (on Cefazolin, potentially due to urine vs line-associated and MSSA in sputum due to PNA), also with lung abscesses on CT chest on 12/5 , ARTEMIO/ATN requiring CRRT and HD. Now unable to wean from ventilator and transferred from [facility] on 12/10 to offload COVID unit. Unable to wean from ventilator, s/p tracheostmy with ctsx 12/18 planned for PEG 12/24 but unable to perform due to large ventral hernia     # AMS  - Alert & oriented x3 @baseline.   - Pt is able to opens eyes to voice but does not follow any commands. No purposeful movements appreciated.   - c/w Methadone and Valium 5mg TID, wean slowly as tolerated  - Spot EEG - abnormal study- Moderate nonspecific diffuse or multifocal cerebral dysfunction   - Will consider MRI   - Methadone and Valium decreased to bid 1/3; wean again  1/5 methadone to daily  - More awake tracking     # Acute hypoxemic respiratory failure 2/2 covid-19 PNA / MSSA Bacteremia/Pneumonia,  - s/p intubated 11/23. Course complicated by MSSA pneumonia, stenotrophomonas, p. mirabalis, and lung abscess on CT chest from 12/5.   - Pt has been unable to wean from ventilator, s/p Trached by CTSx on 12/18. Now with significant positional air leak from tracheotomy    - Plan for CTSx to remove trach sutures on 1/1/21  - Pt noted to have large leak. Discussed plan for possible trach upsize with CTSx.   - s/p Imipenem (12/16- 12/28) 500mg q12h x 2 weeks for cavitation and MSSA,  - Will switch Levaquin to Ceftazidime starting today (12/29 - ...) for 7 days course (due to resistance to Levaquin) until 1/5/2021  - To OR today with THoracic- and trach change to 8Fr Bivona w/cannula at 13.5cm  - Some bleeding noted when suctioned but improved - still bloody secretions. Sequentials ordered   - Lovenox daily started     # Hypernatremia  - Na 155>>157>>152, s/p D5W 50cc/hr for few hours, and Free water [id number]  - Will increase free water to 450 q 4 - and d'shagufta D5W for now  - Will monitor -Na+ 149 today - continue free water   - Na improving , free water lowered 1/1/21 Na 146 1/2/2021 - 143   - Sodium normalized   - Plan for TOV tonight 1/4passed and voiding     #Hypokalemia  - 2.8 on 1/3 and repleted  - Monitor K+ and replete as needed  - 1/5 K 3.8     # Hx of DVT (on Xarelto at home)  - s/p heparin gtt in Surg B, but was stopped due to low H/H requiring transfusion of PRBC    - c/w home metoprolol and Amlodipine to better monitor Blood pressure    - CTA negative for PE  - Lovenox restarted give one dose - npo after MN for trach exchange some bleeding from trach  Sequentials ordered   Lovenox daily restarted follow secretions     # ARTEMIO 2/2 ATN   - s/p CRRT, and Bumex and intermittent HD.   - Last HD 12/17, Bumex gtt d/c'ed. Now with good UOP   - c/w Rivera for now, will consider TOV in the near future  - Strict I’s and O’s    # Diet  - Pt is not a candidate for Percutaneous Gastrostomy due to large ventral hernia as per Dr. Cruz  - General surgery to evaluate for Possible Open Gastrostomy tube  - c/w NGT feeding for now, tolerating well  - c/w bowel regimen  - c/w NPH q6hr, and ISS - Monitor FS routinely  - FS accucheck <100 past 24 h -Stop NPH and monitor - still controlled off NPH for now   - IR consulted for PEG placement as per surgery will not be able to go to OR as pt has ventral hernias making her high risk.    #HTN   -amlodipine /metorpolol   - Monitor bp with parameters     #Pressure Ulcer  - Plastic surgery for chin necrosis when more stable   - Continue wound recs for now   - Plastics consulted - rec santyl to cheek/chin area bid and will follow     #Dispo   - RCU   - PT/OT - dc from skilled PT services at this time 2/2 unable to participate   More awake with sedation tapered - PT/OT reconsulted

## 2021-01-05 NOTE — OCCUPATIONAL THERAPY INITIAL EVALUATION ADULT - PRECAUTIONS/LIMITATIONS, REHAB EVAL
aspiration precautions/fall precautions
+ ventilator/aspiration precautions/obesity precautions/oxygen therapy device and L/min/surgical precautions

## 2021-01-05 NOTE — PHYSICAL THERAPY INITIAL EVALUATION ADULT - DISCHARGE DISPOSITION, PT EVAL
Patient not an appropriate rehab candidate nor for skilled PT intervention at this time. Patient unable to follow commands. Please re-consult this discipline if condition improves.
Pt to be discharged from skilled PT services at this time secondary to pt unable to follow commands/participate in therapy services. Team made aware and in agreement. Please reconsult for therapy services if appropriate and feasible.
extended care facility

## 2021-01-05 NOTE — PROGRESS NOTE ADULT - SUBJECTIVE AND OBJECTIVE BOX
CHIEF COMPLAINT: Patient is a 60y old  Female who presents with a chief complaint of Transfer from [facility] (04 Jan 2021 16:40)      Interval Events: Pt seen and evaluated at bedside     REVIEW OF SYSTEMS:  [x ] Unable to assess ROS because AMS    OBJECTIVE:  ICU Vital Signs Last 24 Hrs  T(C): 37.2 (05 Jan 2021 05:32), Max: 37.4 (04 Jan 2021 09:41)  T(F): 98.9 (05 Jan 2021 05:32), Max: 99.3 (04 Jan 2021 09:41)  HR: 97 (05 Jan 2021 05:32) (86 - 113)  BP: 126/56 (05 Jan 2021 05:32) (114/51 - 137/58)  BP(mean): 79 (04 Jan 2021 17:30) (74 - 79)  ABP: --  ABP(mean): --  RR: 19 (05 Jan 2021 05:32) (19 - 24)  SpO2: 100% (05 Jan 2021 05:32) (93% - 100%)    Mode: AC/ CMV (Assist Control/ Continuous Mandatory Ventilation), RR (machine): 12, FiO2: 40, PEEP: 5, ITime: 1, MAP: 16, PC: 25, PIP: 36    01-03 @ 07:01 - 01-04 @ 07:00  --------------------------------------------------------  IN: 800 mL / OUT: 700 mL / NET: 100 mL    01-04 @ 07:01  - 01-05 @ 06:45  --------------------------------------------------------  IN: 1960 mL / OUT: 1800 mL / NET: 160 mL      CAPILLARY BLOOD GLUCOSE      POCT Blood Glucose.: 124 mg/dL (05 Jan 2021 05:19)        HOSPITAL MEDICATIONS:  MEDICATIONS  (STANDING):  amLODIPine   Tablet 5 milliGRAM(s) Oral daily  chlorhexidine 0.12% Liquid 15 milliLiter(s) Oral Mucosa every 12 hours  chlorhexidine 4% Liquid 1 Application(s) Topical daily  diazepam    Tablet 5 milliGRAM(s) Oral every 12 hours  insulin lispro (ADMELOG) corrective regimen sliding scale   SubCutaneous every 6 hours  lactobacillus acidophilus 1 Tablet(s) Oral two times a day  methadone   Solution 5 milliGRAM(s) Oral every 12 hours  metoprolol tartrate 50 milliGRAM(s) Oral two times a day  pantoprazole  Injectable 40 milliGRAM(s) IV Push daily    MEDICATIONS  (PRN):  acetaminophen    Suspension .. 650 milliGRAM(s) Oral every 6 hours PRN Temp greater or equal to 38C (100.4F)      LABS:                        7.7    13.51 )-----------( 320      ( 04 Jan 2021 10:07 )             26.1     01-04    143  |  102  |  10  ----------------------------<  136<H>  3.3<L>   |  35<H>  |  0.32<L>    Ca    7.9<L>      04 Jan 2021 10:07  Mg     1.6     01-04    TPro  6.4  /  Alb  2.2<L>  /  TBili  0.4  /  DBili  x   /  AST  18  /  ALT  6   /  AlkPhos  75  01-04              MICROBIOLOGY:     RADIOLOGY:  [ ] Reviewed and interpreted by me    PULMONARY FUNCTION TESTS:    EKG: CHIEF COMPLAINT: Patient is a 60y old  Female who presents with a chief complaint of Transfer from [facility] (04 Jan 2021 16:40)      Interval Events: Pt seen and evaluated at bedside     REVIEW OF SYSTEMS:  Pt more awake- denies pain  Cor denies   GI denies     OBJECTIVE:  ICU Vital Signs Last 24 Hrs  T(C): 37.2 (05 Jan 2021 05:32), Max: 37.4 (04 Jan 2021 09:41)  T(F): 98.9 (05 Jan 2021 05:32), Max: 99.3 (04 Jan 2021 09:41)  HR: 97 (05 Jan 2021 05:32) (86 - 113)  BP: 126/56 (05 Jan 2021 05:32) (114/51 - 137/58)  BP(mean): 79 (04 Jan 2021 17:30) (74 - 79)  ABP: --  ABP(mean): --  RR: 19 (05 Jan 2021 05:32) (19 - 24)  SpO2: 100% (05 Jan 2021 05:32) (93% - 100%)    Mode: AC/ CMV (Assist Control/ Continuous Mandatory Ventilation), RR (machine): 12, FiO2: 40, PEEP: 5, ITime: 1, MAP: 16, PC: 25, PIP: 36    01-03 @ 07:01 - 01-04 @ 07:00  --------------------------------------------------------  IN: 800 mL / OUT: 700 mL / NET: 100 mL    01-04 @ 07:01  - 01-05 @ 06:45  --------------------------------------------------------  IN: 1960 mL / OUT: 1800 mL / NET: 160 mL      CAPILLARY BLOOD GLUCOSE      POCT Blood Glucose.: 124 mg/dL (05 Jan 2021 05:19)        HOSPITAL MEDICATIONS:  MEDICATIONS  (STANDING):  amLODIPine   Tablet 5 milliGRAM(s) Oral daily  chlorhexidine 0.12% Liquid 15 milliLiter(s) Oral Mucosa every 12 hours  chlorhexidine 4% Liquid 1 Application(s) Topical daily  diazepam    Tablet 5 milliGRAM(s) Oral every 12 hours  insulin lispro (ADMELOG) corrective regimen sliding scale   SubCutaneous every 6 hours  lactobacillus acidophilus 1 Tablet(s) Oral two times a day  methadone   Solution 5 milliGRAM(s) Oral every 12 hours  metoprolol tartrate 50 milliGRAM(s) Oral two times a day  pantoprazole  Injectable 40 milliGRAM(s) IV Push daily    MEDICATIONS  (PRN):  acetaminophen    Suspension .. 650 milliGRAM(s) Oral every 6 hours PRN Temp greater or equal to 38C (100.4F)      LABS:                        7.7    13.51 )-----------( 320      ( 04 Jan 2021 10:07 )             26.1     01-04    143  |  102  |  10  ----------------------------<  136<H>  3.3<L>   |  35<H>  |  0.32<L>    Ca    7.9<L>      04 Jan 2021 10:07  Mg     1.6     01-04    TPro  6.4  /  Alb  2.2<L>  /  TBili  0.4  /  DBili  x   /  AST  18  /  ALT  6   /  AlkPhos  75  01-04              MICROBIOLOGY:     RADIOLOGY:  [ ] Reviewed and interpreted by me    PULMONARY FUNCTION TESTS:    EKG:

## 2021-01-05 NOTE — PHYSICAL THERAPY INITIAL EVALUATION ADULT - MANUAL MUSCLE TESTING RESULTS, REHAB EVAL
RLE ankle dorsiflexion 2-/5, hip flexors 1/5, quads 1/5. LLE ankle dorsiflexors 0/5, hip flexors 1/5, quads 1/5
cognition; no active movement noted./not tested due to
Unable to assess, pt unable to follow commands.

## 2021-01-05 NOTE — PHYSICAL THERAPY INITIAL EVALUATION ADULT - DID THE PATIENT HAVE SURGERY?
Bronchoscopy, with tracheostomy tube replacement/yes
Bronchoscopy, with tracheostomy tube replacement/yes
n/a

## 2021-01-05 NOTE — PHYSICAL THERAPY INITIAL EVALUATION ADULT - PERTINENT HX OF CURRENT PROBLEM, REHAB EVAL
Patient is a 60 year old female transferred from [facility] for COVID. PMH listed below
Pt is a 60 year old female transferred from [facility] on 12/10 with COVID-19. Patient was intubated for airway protection on 11/23/2020. Pt s/p tracheostomy on 12/18. PMH: HTN, DVT on Xarelto, peritonitis s/p Oral's procedure and ileostomy (reversed 2011), AARON.
Pt is a 60 year old female transferred from [facility] on 12/10 with COVID-19. Patient was intubated for airway protection on 11/23/2020. Pt s/p tracheostomy on 12/18. PMH: HTN, DVT on Xarelto, peritonitis s/p Oral's procedure and ileostomy (reversed 2011), AARON.

## 2021-01-05 NOTE — OCCUPATIONAL THERAPY INITIAL EVALUATION ADULT - RANGE OF MOTION EXAMINATION, UPPER EXTREMITY
minimal active movement to all extremities/bilateral UE Passive ROM was WFL  (within functional limits)
No spontaneous movement noted/bilateral UE Passive ROM was WFL  (within functional limits)

## 2021-01-05 NOTE — PHYSICAL THERAPY INITIAL EVALUATION ADULT - PATIENT PROFILE REVIEW, REHAB EVAL
PT orders received: Increase as tolerated. Consult with KAT TRAN, patient may participate in PT evaluation./yes
yes
yes

## 2021-01-05 NOTE — OCCUPATIONAL THERAPY INITIAL EVALUATION ADULT - PERTINENT HX OF CURRENT PROBLEM, REHAB EVAL
60 year old female with history of of HTN, DVT, peritonitis s/p Oral's procedure and ileostomy (reversed 2011), AARON who presented to [facility] on 11/18 with fevers, found to have COVID-19, intubated 11/23. Course complicated by superimposed PNA and bacteremia, ARTEMIO/ATN requiring CRRT and HD. Transferred from [facility] on 12/10 to offload COVID unit. Now s/p tracheostomy on 12/18/2020 with intermittent air leak.
60 year old female with history of of HTN, DVT, peritonitis s/p Oral's procedure and ileostomy (reversed 2011), AARON who presented to [facility] on 11/18 with fevers, found to have COVID-19, intubated 11/23. Course complicated by superimposed PNA and bacteremia, ARTEMIO/ATN requiring CRRT and HD. Transferred from [facility] on 12/10 to offload COVID unit. Now s/p tracheostomy on 12/18/2020 with intermittent air leak.

## 2021-01-06 LAB
ANION GAP SERPL CALC-SCNC: 9 MMOL/L — SIGNIFICANT CHANGE UP (ref 7–14)
BUN SERPL-MCNC: 8 MG/DL — SIGNIFICANT CHANGE UP (ref 7–23)
CALCIUM SERPL-MCNC: 8.1 MG/DL — LOW (ref 8.4–10.5)
CHLORIDE SERPL-SCNC: 99 MMOL/L — SIGNIFICANT CHANGE UP (ref 98–107)
CO2 SERPL-SCNC: 33 MMOL/L — HIGH (ref 22–31)
CREAT SERPL-MCNC: 0.26 MG/DL — LOW (ref 0.5–1.3)
GLUCOSE BLDC GLUCOMTR-MCNC: 104 MG/DL — HIGH (ref 70–99)
GLUCOSE BLDC GLUCOMTR-MCNC: 113 MG/DL — HIGH (ref 70–99)
GLUCOSE BLDC GLUCOMTR-MCNC: 126 MG/DL — HIGH (ref 70–99)
GLUCOSE BLDC GLUCOMTR-MCNC: 132 MG/DL — HIGH (ref 70–99)
GLUCOSE SERPL-MCNC: 117 MG/DL — HIGH (ref 70–99)
HCT VFR BLD CALC: 28.6 % — LOW (ref 34.5–45)
HGB BLD-MCNC: 8.2 G/DL — LOW (ref 11.5–15.5)
MAGNESIUM SERPL-MCNC: 1.5 MG/DL — LOW (ref 1.6–2.6)
MCHC RBC-ENTMCNC: 28.7 GM/DL — LOW (ref 32–36)
MCHC RBC-ENTMCNC: 28.9 PG — SIGNIFICANT CHANGE UP (ref 27–34)
MCV RBC AUTO: 100.7 FL — HIGH (ref 80–100)
NRBC # BLD: 1 /100 WBCS — SIGNIFICANT CHANGE UP
NRBC # FLD: 0.13 K/UL — HIGH
PHOSPHATE SERPL-MCNC: 2.7 MG/DL — SIGNIFICANT CHANGE UP (ref 2.5–4.5)
PLATELET # BLD AUTO: 396 K/UL — SIGNIFICANT CHANGE UP (ref 150–400)
POTASSIUM SERPL-MCNC: 3.4 MMOL/L — LOW (ref 3.5–5.3)
POTASSIUM SERPL-SCNC: 3.4 MMOL/L — LOW (ref 3.5–5.3)
RBC # BLD: 2.84 M/UL — LOW (ref 3.8–5.2)
RBC # FLD: 18.8 % — HIGH (ref 10.3–14.5)
SODIUM SERPL-SCNC: 141 MMOL/L — SIGNIFICANT CHANGE UP (ref 135–145)
WBC # BLD: 12.67 K/UL — HIGH (ref 3.8–10.5)
WBC # FLD AUTO: 12.67 K/UL — HIGH (ref 3.8–10.5)

## 2021-01-06 PROCEDURE — 99233 SBSQ HOSP IP/OBS HIGH 50: CPT | Mod: GC

## 2021-01-06 RX ORDER — MAGNESIUM SULFATE 500 MG/ML
2 VIAL (ML) INJECTION ONCE
Refills: 0 | Status: COMPLETED | OUTPATIENT
Start: 2021-01-06 | End: 2021-01-06

## 2021-01-06 RX ORDER — POTASSIUM CHLORIDE 20 MEQ
40 PACKET (EA) ORAL EVERY 4 HOURS
Refills: 0 | Status: COMPLETED | OUTPATIENT
Start: 2021-01-06 | End: 2021-01-06

## 2021-01-06 RX ADMIN — Medication 1 TABLET(S): at 17:38

## 2021-01-06 RX ADMIN — Medication 50 MILLIGRAM(S): at 05:30

## 2021-01-06 RX ADMIN — Medication 50 GRAM(S): at 07:35

## 2021-01-06 RX ADMIN — CEFTAZIDIME 100 GRAM(S): 6 INJECTION, POWDER, FOR SOLUTION INTRAVENOUS at 05:29

## 2021-01-06 RX ADMIN — CHLORHEXIDINE GLUCONATE 1 APPLICATION(S): 213 SOLUTION TOPICAL at 11:36

## 2021-01-06 RX ADMIN — Medication 40 MILLIEQUIVALENT(S): at 14:11

## 2021-01-06 RX ADMIN — Medication 5 MILLIGRAM(S): at 05:29

## 2021-01-06 RX ADMIN — AMLODIPINE BESYLATE 5 MILLIGRAM(S): 2.5 TABLET ORAL at 05:29

## 2021-01-06 RX ADMIN — METHADONE HYDROCHLORIDE 5 MILLIGRAM(S): 40 TABLET ORAL at 11:36

## 2021-01-06 RX ADMIN — CHLORHEXIDINE GLUCONATE 15 MILLILITER(S): 213 SOLUTION TOPICAL at 17:38

## 2021-01-06 RX ADMIN — Medication 40 MILLIEQUIVALENT(S): at 09:51

## 2021-01-06 RX ADMIN — Medication 1 APPLICATION(S): at 05:30

## 2021-01-06 RX ADMIN — Medication 1 TABLET(S): at 05:29

## 2021-01-06 RX ADMIN — PANTOPRAZOLE SODIUM 40 MILLIGRAM(S): 20 TABLET, DELAYED RELEASE ORAL at 11:37

## 2021-01-06 RX ADMIN — Medication 50 MILLIGRAM(S): at 17:38

## 2021-01-06 RX ADMIN — CHLORHEXIDINE GLUCONATE 15 MILLILITER(S): 213 SOLUTION TOPICAL at 05:29

## 2021-01-06 RX ADMIN — Medication 1 APPLICATION(S): at 17:38

## 2021-01-06 RX ADMIN — ENOXAPARIN SODIUM 40 MILLIGRAM(S): 100 INJECTION SUBCUTANEOUS at 11:37

## 2021-01-06 NOTE — CONSULT NOTE ADULT - ADDITIONAL PE
Examination of the chin and left cheek reveals a 1cm eschar which is dry of the left cheek and a dry eschar of the chin. There is no purulent drainage noted. No surrounding erythema noted on examination. The surrounding skin is not infected.   No foul smell noted.

## 2021-01-06 NOTE — PROGRESS NOTE ADULT - ATTENDING COMMENTS
Patient seen and examined at bedside. 60 F here with acute hypoxemic respiratory failure / ARDS 2/2 COVID19 PNA further c/b superimposed MSSA cavitary PNA and ARF requiring HD. Pt with failure to wean from mechanical ventilation, s/p tracheostomy placement 12/18 and transfer to [facility] 12/28.     1. Neuro - encephalopathy, remains off all IV sedation since 12/24, remains on methadone and diazepam, which is being tapered. CT Head (-) 12/12.  EEG (-) 12/29. Mental status is improved.    2. Pulm - acute respiratory failure, s/p trach placement (CTSx, 12/18 Proximal 7XLT and then 12/31 8Fr Bivona), mechanical vent dependence and difficulty weaning. Continue with SBTs as tolerated, airway clearance therapy and trach care - still having bloody secretions, which is improved.     3. ID - MSSA bacteremia, cavitary PNA s/p completion of Abx with Primaxin on 12/28. Recurrent fevers and leukocytosis, growing resistant Stenotrophomonas in sputum. Levofloxacin switched to Ceftazidime 12/29 (completed 1/5)    4. GI - oropharayngeal dysphagia - tolerating feeds via NGT, unable to have PEG placed initially given large ventral hernia. Will plan for IR guided placement prior to discharge. Bowel regimen and PPI.     5. Renal - initially ARF on CRRT, following by intermittent HD with renal recovery. Hypernatremia resolved. Continue free water via NGT.    6. Endo - DM - NPH and sliding scale for glucose control.     7. Skin - Wound consult recs appreciated, will call plastics for chin wound.    8. Heme - Previously on A/C for history of VTE. On hold for bleeding from tracheostomy site. Restart PPX dosing today. If no signs of continued bleeding will plan to restart A/C.    9. Dispo - Full code. PT consult.

## 2021-01-06 NOTE — PROGRESS NOTE ADULT - SUBJECTIVE AND OBJECTIVE BOX
CHIEF COMPLAINT:Patient is a 60y old  Female who presents with a chief complaint of Transfer from [facility] (06 Jan 2021 16:26)        Interval Events: Pt seen at bedside with team.  Sedation is being weaned     REVIEW OF SYSTEMS:  Constitutional: Denies pain   ENT: Denies   CV: Denies   Resp: Denies   GI: Denies   [x ] All other systems negative      OBJECTIVE:  ICU Vital Signs Last 24 Hrs  T(C): 36.7 (06 Jan 2021 17:34), Max: 37.1 (05 Jan 2021 21:45)  T(F): 98 (06 Jan 2021 17:34), Max: 98.7 (05 Jan 2021 21:45)  HR: 110 (06 Jan 2021 17:34) (80 - 110)  BP: 140/61 (06 Jan 2021 17:34) (121/56 - 142/61)  BP(mean): --  ABP: --  ABP(mean): --  RR: 34 (06 Jan 2021 17:34) (28 - 34)  SpO2: 100% (06 Jan 2021 17:34) (93% - 100%)    Mode: AC/ CMV (Assist Control/ Continuous Mandatory Ventilation), RR (machine): 12, FiO2: 40, PEEP: 5, ITime: 1, MAP: 18, PC: 25, PIP: 16    01-05 @ 07:01  -  01-06 @ 07:00  --------------------------------------------------------  IN: 1210 mL / OUT: 500 mL / NET: 710 mL      CAPILLARY BLOOD GLUCOSE      POCT Blood Glucose.: 126 mg/dL (06 Jan 2021 17:49)      PHYSICAL EXAM:  General:   HEENT:   Lymph Nodes:  Neck:   Respiratory:   Cardiovascular:   Abdomen:   Extremities:   Skin:   Neurological:  Psychiatry:    HOSPITAL MEDICATIONS:  MEDICATIONS  (STANDING):  amLODIPine   Tablet 5 milliGRAM(s) Oral daily  chlorhexidine 0.12% Liquid 15 milliLiter(s) Oral Mucosa every 12 hours  chlorhexidine 4% Liquid 1 Application(s) Topical daily  collagenase Ointment 1 Application(s) Topical two times a day  enoxaparin Injectable 40 milliGRAM(s) SubCutaneous daily  insulin lispro (ADMELOG) corrective regimen sliding scale   SubCutaneous every 6 hours  lactobacillus acidophilus 1 Tablet(s) Oral two times a day  methadone   Solution 5 milliGRAM(s) Oral daily  metoprolol tartrate 50 milliGRAM(s) Oral two times a day  pantoprazole  Injectable 40 milliGRAM(s) IV Push daily    MEDICATIONS  (PRN):  acetaminophen    Suspension .. 650 milliGRAM(s) Oral every 6 hours PRN Temp greater or equal to 38C (100.4F)      LABS:                        8.2    12.67 )-----------( 396      ( 06 Jan 2021 04:56 )             28.6     01-06    141  |  99  |  8   ----------------------------<  117<H>  3.4<L>   |  33<H>  |  0.26<L>    Ca    8.1<L>      06 Jan 2021 04:56  Phos  2.7     01-06  Mg     1.5     01-06                MICROBIOLOGY:     RADIOLOGY:  [ ] Reviewed and interpreted by me    PULMONARY FUNCTION TESTS:    EKG:

## 2021-01-06 NOTE — CONSULT NOTE ADULT - SUBJECTIVE AND OBJECTIVE BOX
Patient is a 60 year old female who was transferred from [facility]. The patient had COVID and was in a prone position for respiratory issues. The patient was eventually stabilized and then developed a pressure ulcer on her chin and left cheek. Plastic surgery was consulted for further evaluation and treatment options. The patient has a feeding tube in place and a trach collar. She is not 100% responsive to questions.     PAST MEDICAL & SURGICAL HISTORY:  Pneumomediastinum    Umbilical hernia  Reduciable    Osteoarthritis of both knees, unspecified osteoarthritis type    AARON (Obstructive Sleep Apnea)  category II pt uses c/pap pt to bering to hospital/  OR booking notified    Pneumonia      GERD (Gastroesophageal Reflux Disease)    Asthma  diagnosed   on adbvair denies attacks    DVT (Deep Venous Thrombosis)  left leg 6 m s/p knee replacement in     HTN (Hypertension)    H/O ileostomy  Ileostomy Revesal     S/P LEEP  2000    S/P Exploratory Laparotomy    peritonitis  s/p right knee replacement/ colon resection and ileostomy    S/P Colonoscopy      S/P Endoscopy   gerd    S/P  Section      History of Tubal Ligation  s/p c/section 2000    S/P Knee Surgery  2010      Social History:  None    FAMILY HISTORY:  Family history of gastric cancer    Family history of breast cancer (Grandparent)      ICU Vital Signs Last 24 Hrs  T(C): 36.5 (2021 13:55), Max: 37.1 (2021 17:11)  T(F): 97.7 (2021 13:55), Max: 98.7 (2021 17:11)  HR: 90 (2021 13:55) (80 - 99)  BP: 136/63 (2021 13:55) (121/56 - 142/61)  BP(mean): --  ABP: --  ABP(mean): --  RR: 31 (2021 13:55) (27 - 34)  SpO2: 97% (2021 13:55) (93% - 100%)                          8.2    12.67 )-----------( 396      ( 2021 04:56 )             28.6     01-06    141  |  99  |  8   ----------------------------<  117<H>  3.4<L>   |  33<H>  |  0.26<L>    Ca    8.1<L>      2021 04:56  Phos  2.7     -  Mg     1.5     -      MEDICATIONS  (STANDING):  amLODIPine   Tablet 5 milliGRAM(s) Oral daily  chlorhexidine 0.12% Liquid 15 milliLiter(s) Oral Mucosa every 12 hours  chlorhexidine 4% Liquid 1 Application(s) Topical daily  collagenase Ointment 1 Application(s) Topical two times a day  enoxaparin Injectable 40 milliGRAM(s) SubCutaneous daily  insulin lispro (ADMELOG) corrective regimen sliding scale   SubCutaneous every 6 hours  lactobacillus acidophilus 1 Tablet(s) Oral two times a day  methadone   Solution 5 milliGRAM(s) Oral daily  metoprolol tartrate 50 milliGRAM(s) Oral two times a day  pantoprazole  Injectable 40 milliGRAM(s) IV Push daily    MEDICATIONS  (PRN):  acetaminophen    Suspension .. 650 milliGRAM(s) Oral every 6 hours PRN Temp greater or equal to 38C (100.4F)

## 2021-01-06 NOTE — PROGRESS NOTE ADULT - ASSESSMENT
This is a 60 year old Female with a PMH of HTN, DVT on Xarelto, peritonitis s/p Oral's procedure and ileostomy (reversed 2011), AARON who presented to [facility] on 11/18  w/ fevers found to have COVID-19, intubated 11/23, course complicated by superimposed PNA and bacteremia with Stenotrophomonas (12/11, completed Levaquin x7 days), MSSA/P. mirablis bacteremia (on Cefazolin, potentially due to urine vs line-associated and MSSA in sputum due to PNA), also with lung abscesses on CT chest on 12/5 , ARTEMIO/ATN requiring CRRT and HD. Now unable to wean from ventilator and transferred from [facility] on 12/10 to offload COVID unit. Unable to wean from ventilator, s/p tracheostmy with ctsx 12/18 planned for PEG 12/24 but unable to perform due to large ventral hernia     # AMS  - Alert & oriented x3 @baseline.   - Pt is able to opens eyes to voice but does not follow any commands. No purposeful movements appreciated.   - c/w Methadone and Valium 5mg TID, wean slowly as tolerated  - Spot EEG - abnormal study- Moderate nonspecific diffuse or multifocal cerebral dysfunction   - Will consider MRI   - Methadone and Valium decreased to bid 1/3; wean again  1/5 methadone to daily  - More awake tracking   - Answering yes/no questions. follows simple commands   - valium dc , methadone daily   - PT/OT seeing pt     # Acute hypoxemic respiratory failure 2/2 covid-19 PNA / MSSA Bacteremia/Pneumonia,  - s/p intubated 11/23. Course complicated by MSSA pneumonia, stenotrophomonas, p. mirabalis, and lung abscess on CT chest from 12/5.   - Pt has been unable to wean from ventilator, s/p Trached by CTSx on 12/18. Now with significant positional air leak from tracheotomy    - Plan for CTSx to remove trach sutures on 1/1/21  - Pt noted to have large leak. Discussed plan for possible trach upsize with CTSx.   - s/p Imipenem (12/16- 12/28) 500mg q12h x 2 weeks for cavitation and MSSA,  - Will switch Levaquin to Ceftazidime starting today (12/29 - ...) for 7 days course (due to resistance to Levaquin) until 1/5/2021  - To OR today with THoracic- and trach change to 8Fr Bivona w/cannula at 13.5cm  - Some bleeding noted when suctioned but improved - still bloody secretions. Sequentials ordered   - Lovenox daily started     # Hypernatremia  - Na 155>>157>>152, s/p D5W 50cc/hr for few hours, and Free water [id number]  - Will increase free water to 450 q 4 - and d'shagufta D5W for now  - Will monitor -Na+ 149 today - continue free water   - Na improving , free water lowered 1/1/21 Na 146 1/2/2021 - 143   - Sodium normalized   - Plan for TOV tonight 1/4passed and voiding     #Hypokalemia  - 2.8 on 1/3 and repleted  - Monitor K+ and replete as needed  - 1/5 K 3.8  - 1/6 Mg/K+ repleted      # Hx of DVT (on Xarelto at home)  - s/p heparin gtt in Surg B, but was stopped due to low H/H requiring transfusion of PRBC    - c/w home metoprolol and Amlodipine to better monitor Blood pressure    - CTA negative for PE  - Lovenox restarted give one dose - npo after MN for trach exchange some bleeding from trach  Sequentials ordered   Lovenox daily restarted follow secretions     # ARTEMIO 2/2 ATN   - s/p CRRT, and Bumex and intermittent HD.   - Last HD 12/17, Bumex gtt d/c'ed. Now with good UOP   - c/w Rivera for now, will consider TOV in the near future  - Strict I’s and O’s    # Diet  - Pt is not a candidate for Percutaneous Gastrostomy due to large ventral hernia as per Dr. Cruz  - General surgery to evaluate for Possible Open Gastrostomy tube  - c/w NGT feeding for now, tolerating well  - c/w bowel regimen  - c/w NPH q6hr, and ISS - Monitor FS routinely  - FS accucheck <100 past 24 h -Stop NPH and monitor - still controlled off NPH for now   - IR consulted for PEG placement as per surgery will not be able to go to OR as pt has ventral hernias making her high risk.  - Reconsult IR for PEG as close to dc     #HTN   -amlodipine /metorpolol   - Monitor bp with parameters     #Pressure Ulcer  - Plastic surgery for chin necrosis when more stable   - Continue wound recs for now   - Plastics consulted - rec santyl to cheek/chin area bid and will follow     #Dispo   - RCU   - PT/OT - PT/OT reconsulted and pt on program   - DC most likely to vent facility

## 2021-01-07 LAB
ANION GAP SERPL CALC-SCNC: 6 MMOL/L — LOW (ref 7–14)
BUN SERPL-MCNC: 8 MG/DL — SIGNIFICANT CHANGE UP (ref 7–23)
CALCIUM SERPL-MCNC: 8.3 MG/DL — LOW (ref 8.4–10.5)
CHLORIDE SERPL-SCNC: 99 MMOL/L — SIGNIFICANT CHANGE UP (ref 98–107)
CO2 SERPL-SCNC: 32 MMOL/L — HIGH (ref 22–31)
CREAT SERPL-MCNC: 0.24 MG/DL — LOW (ref 0.5–1.3)
GLUCOSE BLDC GLUCOMTR-MCNC: 108 MG/DL — HIGH (ref 70–99)
GLUCOSE BLDC GLUCOMTR-MCNC: 124 MG/DL — HIGH (ref 70–99)
GLUCOSE BLDC GLUCOMTR-MCNC: 125 MG/DL — HIGH (ref 70–99)
GLUCOSE SERPL-MCNC: 122 MG/DL — HIGH (ref 70–99)
HCT VFR BLD CALC: 27.4 % — LOW (ref 34.5–45)
HGB BLD-MCNC: 7.6 G/DL — LOW (ref 11.5–15.5)
MAGNESIUM SERPL-MCNC: 1.5 MG/DL — LOW (ref 1.6–2.6)
MCHC RBC-ENTMCNC: 27.7 GM/DL — LOW (ref 32–36)
MCHC RBC-ENTMCNC: 28.7 PG — SIGNIFICANT CHANGE UP (ref 27–34)
MCV RBC AUTO: 103.4 FL — HIGH (ref 80–100)
NRBC # BLD: 0 /100 WBCS — SIGNIFICANT CHANGE UP
NRBC # FLD: 0.09 K/UL — HIGH
PHOSPHATE SERPL-MCNC: 2.5 MG/DL — SIGNIFICANT CHANGE UP (ref 2.5–4.5)
PLATELET # BLD AUTO: 373 K/UL — SIGNIFICANT CHANGE UP (ref 150–400)
POTASSIUM SERPL-MCNC: 4.1 MMOL/L — SIGNIFICANT CHANGE UP (ref 3.5–5.3)
POTASSIUM SERPL-SCNC: 4.1 MMOL/L — SIGNIFICANT CHANGE UP (ref 3.5–5.3)
RBC # BLD: 2.65 M/UL — LOW (ref 3.8–5.2)
RBC # FLD: 19.2 % — HIGH (ref 10.3–14.5)
SODIUM SERPL-SCNC: 137 MMOL/L — SIGNIFICANT CHANGE UP (ref 135–145)
WBC # BLD: 10.28 K/UL — SIGNIFICANT CHANGE UP (ref 3.8–10.5)
WBC # FLD AUTO: 10.28 K/UL — SIGNIFICANT CHANGE UP (ref 3.8–10.5)

## 2021-01-07 PROCEDURE — 99233 SBSQ HOSP IP/OBS HIGH 50: CPT | Mod: GC

## 2021-01-07 RX ORDER — PSYLLIUM SEED (WITH DEXTROSE)
1 POWDER (GRAM) ORAL
Refills: 0 | Status: DISCONTINUED | OUTPATIENT
Start: 2021-01-07 | End: 2021-02-01

## 2021-01-07 RX ORDER — METHADONE HYDROCHLORIDE 40 MG/1
5 TABLET ORAL DAILY
Refills: 0 | Status: DISCONTINUED | OUTPATIENT
Start: 2021-01-07 | End: 2021-01-08

## 2021-01-07 RX ORDER — MAGNESIUM SULFATE 500 MG/ML
2 VIAL (ML) INJECTION ONCE
Refills: 0 | Status: COMPLETED | OUTPATIENT
Start: 2021-01-07 | End: 2021-01-07

## 2021-01-07 RX ADMIN — AMLODIPINE BESYLATE 5 MILLIGRAM(S): 2.5 TABLET ORAL at 05:01

## 2021-01-07 RX ADMIN — Medication 1 TABLET(S): at 05:01

## 2021-01-07 RX ADMIN — METHADONE HYDROCHLORIDE 5 MILLIGRAM(S): 40 TABLET ORAL at 12:48

## 2021-01-07 RX ADMIN — CHLORHEXIDINE GLUCONATE 1 APPLICATION(S): 213 SOLUTION TOPICAL at 12:49

## 2021-01-07 RX ADMIN — Medication 1 APPLICATION(S): at 05:01

## 2021-01-07 RX ADMIN — Medication 50 GRAM(S): at 07:50

## 2021-01-07 RX ADMIN — Medication 50 MILLIGRAM(S): at 05:01

## 2021-01-07 RX ADMIN — Medication 50 MILLIGRAM(S): at 17:28

## 2021-01-07 RX ADMIN — Medication 1 TABLET(S): at 17:28

## 2021-01-07 RX ADMIN — Medication 1 APPLICATION(S): at 17:29

## 2021-01-07 RX ADMIN — Medication 1 PACKET(S): at 17:33

## 2021-01-07 RX ADMIN — CHLORHEXIDINE GLUCONATE 15 MILLILITER(S): 213 SOLUTION TOPICAL at 17:28

## 2021-01-07 RX ADMIN — PANTOPRAZOLE SODIUM 40 MILLIGRAM(S): 20 TABLET, DELAYED RELEASE ORAL at 12:49

## 2021-01-07 RX ADMIN — CHLORHEXIDINE GLUCONATE 15 MILLILITER(S): 213 SOLUTION TOPICAL at 05:01

## 2021-01-07 RX ADMIN — ENOXAPARIN SODIUM 40 MILLIGRAM(S): 100 INJECTION SUBCUTANEOUS at 12:48

## 2021-01-07 NOTE — PROGRESS NOTE ADULT - ASSESSMENT
This is a 60 year old Female with a PMH of HTN, DVT on Xarelto, peritonitis s/p Oral's procedure and ileostomy (reversed 2011), AARON who presented to [facility] on 11/18  w/ fevers found to have COVID-19, intubated 11/23, course complicated by superimposed PNA and bacteremia with Stenotrophomonas (12/11, completed Levaquin x7 days), MSSA/P. mirablis bacteremia (on Cefazolin, potentially due to urine vs line-associated and MSSA in sputum due to PNA), also with lung abscesses on CT chest on 12/5 , ARTEMIO/ATN requiring CRRT and HD. Now unable to wean from ventilator and transferred from [facility] on 12/10 to offload COVID unit. Unable to wean from ventilator, s/p tracheostmy with ctsx 12/18 planned for PEG 12/24 but unable to perform due to large ventral hernia     # AMS  - Alert & oriented x3 @baseline.   - Pt is able to opens eyes to voice but does not follow any commands. No purposeful movements appreciated.   - c/w Methadone and Valium 5mg TID, wean slowly as tolerated  - Spot EEG - abnormal study- Moderate nonspecific diffuse or multifocal cerebral dysfunction   - Will consider MRI   - Methadone and Valium decreased to bid 1/3; wean again  1/5 methadone to daily  - More awake tracking   - Answering yes/no questions. follows simple commands   - valium dc , methadone daily   - PT/OT seeing pt     # Acute hypoxemic respiratory failure 2/2 covid-19 PNA / MSSA Bacteremia/Pneumonia,  - s/p intubated 11/23. Course complicated by MSSA pneumonia, stenotrophomonas, p. mirabalis, and lung abscess on CT chest from 12/5.   - Pt has been unable to wean from ventilator, s/p Trached by CTSx on 12/18. Now with significant positional air leak from tracheotomy    - Plan for CTSx to remove trach sutures on 1/1/21  - Pt noted to have large leak. Discussed plan for possible trach upsize with CTSx.   - s/p Imipenem (12/16- 12/28) 500mg q12h x 2 weeks for cavitation and MSSA,  - Will switch Levaquin to Ceftazidime starting today (12/29 - ...) for 7 days course (due to resistance to Levaquin) until 1/5/2021  - To OR today with THoracic- and trach change to 8Fr Bivona w/cannula at 13.5cm  - Some bleeding noted when suctioned but improved - still bloody secretions. Sequentials ordered   - Lovenox daily started     # Hypernatremia  - Na 155>>157>>152, s/p D5W 50cc/hr for few hours, and Free water [id number]  - Will increase free water to 450 q 4 - and d'hsagufta D5W for now  - Will monitor -Na+ 149 today - continue free water   - Na improving , free water lowered 1/1/21 Na 146 1/2/2021 - 143   - Sodium normalized   - Plan for TOV tonight 1/4passed and voiding     #Hypokalemia  - 2.8 on 1/3 and repleted  - Monitor K+ and replete as needed  - 1/5 K 3.8  - 1/6 Mg/K+ repleted      # Hx of DVT (on Xarelto at home)  - s/p heparin gtt in Surg B, but was stopped due to low H/H requiring transfusion of PRBC    - c/w home metoprolol and Amlodipine to better monitor Blood pressure    - CTA negative for PE  - Lovenox restarted give one dose - npo after MN for trach exchange some bleeding from trach  Sequentials ordered   Lovenox daily restarted follow secretions     # ARTEMIO 2/2 ATN   - s/p CRRT, and Bumex and intermittent HD.   - Last HD 12/17, Bumex gtt d/c'ed. Now with good UOP   - c/w Rivera for now, will consider TOV in the near future  - Strict I’s and O’s    # Diet  - Pt is not a candidate for Percutaneous Gastrostomy due to large ventral hernia as per Dr. Cruz  - General surgery to evaluate for Possible Open Gastrostomy tube  - c/w NGT feeding for now, tolerating well  - c/w bowel regimen  - c/w NPH q6hr, and ISS - Monitor FS routinely  - FS accucheck <100 past 24 h -Stop NPH and monitor - still controlled off NPH for now   - IR consulted for PEG placement as per surgery will not be able to go to OR as pt has ventral hernias making her high risk.  - Reconsult IR for PEG as close to dc     #HTN   -amlodipine /metorpolol   - Monitor bp with parameters     #Pressure Ulcer  - Plastic surgery for chin necrosis when more stable   - Continue wound recs for now   - Plastics consulted - rec santyl to cheek/chin area bid and will follow     #Dispo   - RCU   - PT/OT - PT/OT reconsulted and pt on program   - DC most likely to vent facility    This is a 60 year old Female with a PMH of HTN, DVT on Xarelto, peritonitis s/p Oral's procedure and ileostomy (reversed 2011), AARON who presented to [facility] on 11/18  w/ fevers found to have COVID-19, intubated 11/23, course complicated by superimposed PNA and bacteremia with Stenotrophomonas (12/11, completed Levaquin x7 days), MSSA/P. mirablis bacteremia (on Cefazolin, potentially due to urine vs line-associated and MSSA in sputum due to PNA), also with lung abscesses on CT chest on 12/5 , ARTEMIO/ATN requiring CRRT and HD. Now unable to wean from ventilator and transferred from [facility] on 12/10 to offload COVID unit. Unable to wean from ventilator, s/p tracheostmy with ctsx 12/18 planned for PEG 12/24 but unable to perform due to large ventral hernia     # AMS  - Alert & oriented x3 @baseline.   - Pt is able to opens eyes to voice but does not follow any commands. No purposeful movements appreciated.   - c/w Methadone and Valium 5mg TID, wean slowly as tolerated  - Spot EEG - abnormal study- Moderate nonspecific diffuse or multifocal cerebral dysfunction   - Will consider MRI   - Methadone and Valium decreased to bid 1/3; wean again  1/5 methadone to daily  - More awake tracking   - Answering yes/no questions. follows simple commands   - valium dc , methadone daily   - PT/OT seeing pt due to more alertness/following commands     # Acute hypoxemic respiratory failure 2/2 covid-19 PNA / MSSA Bacteremia/Pneumonia,  - s/p intubated 11/23. Course complicated by MSSA pneumonia, stenotrophomonas, p. mirabalis, and lung abscess on CT chest from 12/5.   - Pt has been unable to wean from ventilator, s/p Trached by CTSx on 12/18. Now with significant positional air leak from tracheotomy    - Plan for CTSx to remove trach sutures on 1/1/21  - Pt noted to have large leak. Discussed plan for possible trach upsize with CTSx.   - s/p Imipenem (12/16- 12/28) 500mg q12h x 2 weeks for cavitation and MSSA,  - Will switch Levaquin to Ceftazidime starting today (12/29 - ...) for 7 days course (due to resistance to Levaquin) until 1/5/2021  - To OR today with THoracic- and trach change to 8Fr Bivona w/cannula at 13.5cm  - Some bleeding noted when suctioned but improved - still bloody secretions. Sequentials ordered   - Lovenox daily started     # Hypernatremia  - Na 155>>157>>152, s/p D5W 50cc/hr for few hours, and Free water [id number]  - Will increase free water to 450 q 4 - and d'shagufta D5W for now  - Will monitor -Na+ 149 today - continue free water   - Na improving , free water lowered 1/1/21 Na 146 1/2/2021 - 143   - Sodium normalized   - Plan for TOV tonight 1/4passed and voiding     #Hypokalemia  - 2.8 on 1/3 and repleted  - Monitor K+ and replete as needed  - 1/5 K 3.8  - 1/6 Mg/K+ repleted      # Hx of DVT (on Xarelto at home)  - s/p heparin gtt in Surg B, but was stopped due to low H/H requiring transfusion of PRBC    - c/w home metoprolol and Amlodipine to better monitor Blood pressure    - CTA negative for PE  - Lovenox restarted give one dose - npo after MN for trach exchange some bleeding from trach  Sequentials ordered   Lovenox daily restarted follow secretions     # ARTEMIO 2/2 ATN   - s/p CRRT, and Bumex and intermittent HD.   - Last HD 12/17, Bumex gtt d/c'ed. Now with good UOP   - c/w Rivera for now, will consider TOV in the near future  - Strict I’s and O’s    # Diet  - Pt is not a candidate for Percutaneous Gastrostomy due to large ventral hernia as per Dr. Cruz  - General surgery to evaluate for Possible Open Gastrostomy tube  - c/w NGT feeding for now, tolerating well  - c/w bowel regimen  - c/w NPH q6hr, and ISS - Monitor FS routinely  - FS accucheck <100 past 24 h -Stop NPH and monitor - still controlled off NPH for now   - IR consulted for PEG placement as per surgery will not be able to go to OR as pt has ventral hernias making her high risk.  - Reconsult IR for PEG as close to dc     #HTN   -amlodipine /metorpolol   - Monitor bp with parameters     #Pressure Ulcer  - Plastic surgery for chin necrosis when more stable   - Continue wound recs for now   - Plastics consulted - rec santyl to cheek/chin area bid and will follow     #Dispo   - RCU   - PT/OT - PT/OT reconsulted and pt on program   - DC most likely to vent facility    This is a 60 year old Female with a PMH of HTN, DVT on Xarelto, peritonitis s/p Oral's procedure and ileostomy (reversed 2011), AARON who presented to [facility] on 11/18  w/ fevers found to have COVID-19, intubated 11/23, course complicated by superimposed PNA and bacteremia with Stenotrophomonas (12/11, completed Levaquin x7 days), MSSA/P. mirablis bacteremia (on Cefazolin, potentially due to urine vs line-associated and MSSA in sputum due to PNA), also with lung abscesses on CT chest on 12/5 , ARTEMIO/ATN requiring CRRT and HD. Now unable to wean from ventilator and transferred from [facility] on 12/10 to offload COVID unit. Unable to wean from ventilator, s/p tracheostmy with ctsx 12/18 planned for PEG 12/24 but unable to perform due to large ventral hernia     # AMS  - Alert & oriented x3 @baseline.   - Pt is able to opens eyes to voice but does not follow any commands. No purposeful movements appreciated.   - c/w Methadone and Valium 5mg TID, wean slowly as tolerated  - Spot EEG - abnormal study- Moderate nonspecific diffuse or multifocal cerebral dysfunction   - Will consider MRI   - Methadone and Valium decreased to bid 1/3; wean again  1/5 methadone to daily  - More awake tracking   - Answering yes/no questions. follows simple commands   - valium dc , methadone daily   - PT/OT seeing pt due to more alertness/following commands     # Acute hypoxemic respiratory failure 2/2 covid-19 PNA / MSSA Bacteremia/Pneumonia,  - s/p intubated 11/23. Course complicated by MSSA pneumonia, stenotrophomonas, p. mirabalis, and lung abscess on CT chest from 12/5.   - Pt has been unable to wean from ventilator, s/p Trached by CTSx on 12/18. Now with significant positional air leak from tracheotomy    - Plan for CTSx to remove trach sutures on 1/1/21  - Pt noted to have large leak. Discussed plan for possible trach upsize with CTSx.   - s/p Imipenem (12/16- 12/28) 500mg q12h x 2 weeks for cavitation and MSSA,  - Will switch Levaquin to Ceftazidime starting today (12/29 - ...) for 7 days course (due to resistance to Levaquin) until 1/5/2021  - To OR today with THoracic- and trach change to 8Fr Bivona w/cannula at 13.5cm  - Some bleeding noted when suctioned but improved - still bloody secretions. Sequentials ordered   - Lovenox daily started     # Hypernatremia  - Na 155>>157>>152, s/p D5W 50cc/hr for few hours, and Free water [id number]  - Will increase free water to 450 q 4 - and d'shagufta D5W for now  - Will monitor -Na+ 149 today - continue free water   - Na improving , free water lowered 1/1/21 Na 146 1/2/2021 - 143   - Sodium normalized   - Plan for TOV tonight 1/4passed and voiding     #Hypokalemia  - 2.8 on 1/3 and repleted  - Monitor K+ and replete as needed  - 1/5 K 3.8  - 1/6 Mg/K+ repleted      # Hx of DVT (on Xarelto at home)  - s/p heparin gtt in Surg B, but was stopped due to low H/H requiring transfusion of PRBC    - c/w home metoprolol and Amlodipine to better monitor Blood pressure    - CTA negative for PE  - Lovenox restarted give one dose - npo after MN for trach exchange some bleeding from trach  Sequentials ordered   Lovenox daily restarted follow secretions     # ARTEMIO 2/2 ATN   - s/p CRRT, and Bumex and intermittent HD.   - Last HD 12/17, Bumex gtt d/c'ed. Now with good UOP   - c/w Rivera for now, will consider TOV in the near future  - Strict I’s and O’s    # Diet  - Pt is not a candidate for Percutaneous Gastrostomy due to large ventral hernia as per Dr. Cruz  - General surgery to evaluate for Possible Open Gastrostomy tube  - c/w NGT feeding for now, tolerating well  - c/w bowel regimen  - c/w NPH q6hr, and ISS - Monitor FS routinely  - FS accucheck <100 past 24 h -Stop NPH and monitor - still controlled off NPH for now   - IR consulted for PEG placement as per surgery will not be able to go to OR as pt has ventral hernias making her high risk.  - Reconsult IR for PEG - they are agreeable for Monday - labs , npo after mn, hold lovenox sunday     #HTN   -amlodipine /metorpolol   - Monitor bp with parameters     #Pressure Ulcer  - Plastic surgery for chin necrosis when more stable   - Continue wound recs for now   - Plastics consulted - rec santyl to cheek/chin area bid and will follow     #Dispo   - RCU   - PT/OT - PT/OT reconsulted and pt on program   - DC most likely to vent facility

## 2021-01-07 NOTE — CHART NOTE - NSCHARTNOTEFT_GEN_A_CORE
60 year old Female with COVID-19 c/b superimposed PNA and bacteremia, as well as lung abscesses, unable to wean from ventilator s/p tracheostomy with ctsx 12/18. Unable to perform planned PEG 12/24 due to large ventral hernia. IR consulted for gastrostomy tube placement. CT reviewed. Appears technically feasible.     - Plan for g-tube placement Monday  - Place IR procedure order under Dr. Rodriguez.      - Will need enteric tube in place for g-tube placement.  - Hold prophylactic Lovenox starting Sunday.  - NPOpMN  - AM labs

## 2021-01-07 NOTE — PROGRESS NOTE ADULT - SUBJECTIVE AND OBJECTIVE BOX
CHIEF COMPLAINT: Patient is a 60y old  Female who presents with a chief complaint of Transfer from [facility] (06 Jan 2021 16:26)      Interval Events: Pt seen and evaluated at bedside.     REVIEW OF SYSTEMS:  Constitutional:   Eyes:  ENT:  CV:  Resp:  GI:  :  MSK:  Integumentary:  Neurological:  Psychiatric:  Endocrine:  Hematologic/Lymphatic:  Allergic/Immunologic:  [ ] All other systems negative  [ ] Unable to assess ROS because ________    OBJECTIVE:  ICU Vital Signs Last 24 Hrs  T(C): 36.4 (07 Jan 2021 04:58), Max: 37.3 (06 Jan 2021 20:48)  T(F): 97.5 (07 Jan 2021 04:58), Max: 99.2 (06 Jan 2021 20:48)  HR: 102 (07 Jan 2021 04:58) (84 - 110)  BP: 144/58 (07 Jan 2021 04:58) (125/56 - 144/58)  BP(mean): --  ABP: --  ABP(mean): --  RR: 18 (07 Jan 2021 04:58) (17 - 34)  SpO2: 95% (07 Jan 2021 04:58) (93% - 100%)    Mode: AC/ CMV (Assist Control/ Continuous Mandatory Ventilation), RR (machine): 12, FiO2: 40, PEEP: 5, ITime: 1, MAP: 17.8, PC: 25, PIP: 34    01-06 @ 07:01  -  01-07 @ 07:00  --------------------------------------------------------  IN: 1160 mL / OUT: 1000 mL / NET: 160 mL      CAPILLARY BLOOD GLUCOSE      POCT Blood Glucose.: 124 mg/dL (07 Jan 2021 05:00)      HOSPITAL MEDICATIONS:  MEDICATIONS  (STANDING):  amLODIPine   Tablet 5 milliGRAM(s) Oral daily  chlorhexidine 0.12% Liquid 15 milliLiter(s) Oral Mucosa every 12 hours  chlorhexidine 4% Liquid 1 Application(s) Topical daily  collagenase Ointment 1 Application(s) Topical two times a day  enoxaparin Injectable 40 milliGRAM(s) SubCutaneous daily  insulin lispro (ADMELOG) corrective regimen sliding scale   SubCutaneous every 6 hours  lactobacillus acidophilus 1 Tablet(s) Oral two times a day  methadone   Solution 5 milliGRAM(s) Oral daily  metoprolol tartrate 50 milliGRAM(s) Oral two times a day  pantoprazole  Injectable 40 milliGRAM(s) IV Push daily    MEDICATIONS  (PRN):  acetaminophen    Suspension .. 650 milliGRAM(s) Oral every 6 hours PRN Temp greater or equal to 38C (100.4F)      LABS:                        7.6    10.28 )-----------( 373      ( 07 Jan 2021 06:32 )             27.4     01-06    141  |  99  |  8   ----------------------------<  117<H>  3.4<L>   |  33<H>  |  0.26<L>    Ca    8.1<L>      06 Jan 2021 04:56  Phos  2.7     01-06  Mg     1.5     01-06                MICROBIOLOGY:     RADIOLOGY:  [ ] Reviewed and interpreted by me    PULMONARY FUNCTION TESTS:    EKG: CHIEF COMPLAINT: Patient is a 60y old  Female who presents with a chief complaint of Transfer from [facility] (06 Jan 2021 16:26)      Interval Events: Pt seen and evaluated at bedside.     REVIEW OF SYSTEMS:  Constitutional: Denies pain   ENT: Denies  CV: Denies  Resp: Denies  GI: Denies  [ ] All other systems negative      OBJECTIVE:  ICU Vital Signs Last 24 Hrs  T(C): 36.4 (07 Jan 2021 04:58), Max: 37.3 (06 Jan 2021 20:48)  T(F): 97.5 (07 Jan 2021 04:58), Max: 99.2 (06 Jan 2021 20:48)  HR: 102 (07 Jan 2021 04:58) (84 - 110)  BP: 144/58 (07 Jan 2021 04:58) (125/56 - 144/58)  BP(mean): --  ABP: --  ABP(mean): --  RR: 18 (07 Jan 2021 04:58) (17 - 34)  SpO2: 95% (07 Jan 2021 04:58) (93% - 100%)    Mode: AC/ CMV (Assist Control/ Continuous Mandatory Ventilation), RR (machine): 12, FiO2: 40, PEEP: 5, ITime: 1, MAP: 17.8, PC: 25, PIP: 34    01-06 @ 07:01  -  01-07 @ 07:00  --------------------------------------------------------  IN: 1160 mL / OUT: 1000 mL / NET: 160 mL      CAPILLARY BLOOD GLUCOSE      POCT Blood Glucose.: 124 mg/dL (07 Jan 2021 05:00)      HOSPITAL MEDICATIONS:  MEDICATIONS  (STANDING):  amLODIPine   Tablet 5 milliGRAM(s) Oral daily  chlorhexidine 0.12% Liquid 15 milliLiter(s) Oral Mucosa every 12 hours  chlorhexidine 4% Liquid 1 Application(s) Topical daily  collagenase Ointment 1 Application(s) Topical two times a day  enoxaparin Injectable 40 milliGRAM(s) SubCutaneous daily  insulin lispro (ADMELOG) corrective regimen sliding scale   SubCutaneous every 6 hours  lactobacillus acidophilus 1 Tablet(s) Oral two times a day  methadone   Solution 5 milliGRAM(s) Oral daily  metoprolol tartrate 50 milliGRAM(s) Oral two times a day  pantoprazole  Injectable 40 milliGRAM(s) IV Push daily    MEDICATIONS  (PRN):  acetaminophen    Suspension .. 650 milliGRAM(s) Oral every 6 hours PRN Temp greater or equal to 38C (100.4F)      LABS:                        7.6    10.28 )-----------( 373      ( 07 Jan 2021 06:32 )             27.4     01-06    141  |  99  |  8   ----------------------------<  117<H>  3.4<L>   |  33<H>  |  0.26<L>    Ca    8.1<L>      06 Jan 2021 04:56  Phos  2.7     01-06  Mg     1.5     01-06                MICROBIOLOGY:     RADIOLOGY:  [ ] Reviewed and interpreted by me    PULMONARY FUNCTION TESTS:    EKG:

## 2021-01-07 NOTE — PROGRESS NOTE ADULT - ATTENDING COMMENTS
Patient seen and examined at bedside. 60 F here with acute hypoxemic respiratory failure / ARDS 2/2 COVID19 PNA further c/b superimposed MSSA cavitary PNA and ARF requiring HD. Pt with failure to wean from mechanical ventilation, s/p tracheostomy placement 12/18 and transfer to RCU 12/28.     1. Neuro - Encephalopathy improved. Remains off all IV sedation since 12/24. Diazepam discontinued today. Remains on methadone. CT Head (-) 12/12.  EEG (-) 12/29.    2. Pulm - acute respiratory failure, s/p trach placement by CTSx (proximal 7XLT 12/18 and then changed to 8Fr Bivona 12/31), mechanical vent dependence and difficulty weaning. Continue with SBTs as tolerated, airway clearance therapy and trach care.    3. ID - MSSA bacteremia, cavitary PNA s/p completion of antibiotics with Primaxin on 12/28. Recurrent fevers and leukocytosis, growing resistant Stenotrophomonas in sputum. Completed course of ceftazidime 1/5.    4. GI - oropharayngeal dysphagia - tolerating feeds via NGT, unable to have PEG placed initially given large ventral hernia. Will plan for IR guided placement prior to discharge. Bowel regimen and PPI.     5. Renal - initially ARF on CRRT, following by intermittent HD with renal recovery. Hypernatremia resolved. Continue free water via NGT.    6. Endo - DM - NPH and sliding scale for glucose control.     7. Skin - Wound consult and plastics input appreciated.    8. Heme - Previously on A/C for history of VTE. On hold for bleeding from tracheostomy site. Restarted on PPX dosing. If no signs of continued bleeding will plan to restart A/C.    9. Dispo - Full code. PT consult.

## 2021-01-08 LAB
ANION GAP SERPL CALC-SCNC: 9 MMOL/L — SIGNIFICANT CHANGE UP (ref 7–14)
APTT BLD: 21.5 SEC — LOW (ref 27–36.3)
BUN SERPL-MCNC: 8 MG/DL — SIGNIFICANT CHANGE UP (ref 7–23)
CALCIUM SERPL-MCNC: 8.6 MG/DL — SIGNIFICANT CHANGE UP (ref 8.4–10.5)
CHLORIDE SERPL-SCNC: 98 MMOL/L — SIGNIFICANT CHANGE UP (ref 98–107)
CO2 SERPL-SCNC: 31 MMOL/L — SIGNIFICANT CHANGE UP (ref 22–31)
CREAT SERPL-MCNC: 0.24 MG/DL — LOW (ref 0.5–1.3)
GLUCOSE BLDC GLUCOMTR-MCNC: 100 MG/DL — HIGH (ref 70–99)
GLUCOSE BLDC GLUCOMTR-MCNC: 102 MG/DL — HIGH (ref 70–99)
GLUCOSE BLDC GLUCOMTR-MCNC: 109 MG/DL — HIGH (ref 70–99)
GLUCOSE BLDC GLUCOMTR-MCNC: 117 MG/DL — HIGH (ref 70–99)
GLUCOSE BLDC GLUCOMTR-MCNC: 128 MG/DL — HIGH (ref 70–99)
GLUCOSE SERPL-MCNC: 108 MG/DL — HIGH (ref 70–99)
HCT VFR BLD CALC: 29.6 % — LOW (ref 34.5–45)
HGB BLD-MCNC: 8.5 G/DL — LOW (ref 11.5–15.5)
INR BLD: 1.03 RATIO — SIGNIFICANT CHANGE UP (ref 0.88–1.16)
MAGNESIUM SERPL-MCNC: 1.4 MG/DL — LOW (ref 1.6–2.6)
MCHC RBC-ENTMCNC: 28.7 GM/DL — LOW (ref 32–36)
MCHC RBC-ENTMCNC: 29.2 PG — SIGNIFICANT CHANGE UP (ref 27–34)
MCV RBC AUTO: 101.7 FL — HIGH (ref 80–100)
NRBC # BLD: 1 /100 WBCS — SIGNIFICANT CHANGE UP
NRBC # FLD: 0.12 K/UL — HIGH
PHOSPHATE SERPL-MCNC: 3 MG/DL — SIGNIFICANT CHANGE UP (ref 2.5–4.5)
PLATELET # BLD AUTO: 406 K/UL — HIGH (ref 150–400)
POTASSIUM SERPL-MCNC: 4.5 MMOL/L — SIGNIFICANT CHANGE UP (ref 3.5–5.3)
POTASSIUM SERPL-SCNC: 4.5 MMOL/L — SIGNIFICANT CHANGE UP (ref 3.5–5.3)
PROTHROM AB SERPL-ACNC: 11.7 SEC — SIGNIFICANT CHANGE UP (ref 10.6–13.6)
RBC # BLD: 2.91 M/UL — LOW (ref 3.8–5.2)
RBC # FLD: 19.3 % — HIGH (ref 10.3–14.5)
SODIUM SERPL-SCNC: 138 MMOL/L — SIGNIFICANT CHANGE UP (ref 135–145)
WBC # BLD: 9.1 K/UL — SIGNIFICANT CHANGE UP (ref 3.8–10.5)
WBC # FLD AUTO: 9.1 K/UL — SIGNIFICANT CHANGE UP (ref 3.8–10.5)

## 2021-01-08 PROCEDURE — 99233 SBSQ HOSP IP/OBS HIGH 50: CPT | Mod: GC

## 2021-01-08 PROCEDURE — 99221 1ST HOSP IP/OBS SF/LOW 40: CPT

## 2021-01-08 RX ORDER — ENOXAPARIN SODIUM 100 MG/ML
100 INJECTION SUBCUTANEOUS EVERY 12 HOURS
Refills: 0 | Status: COMPLETED | OUTPATIENT
Start: 2021-01-08 | End: 2021-01-09

## 2021-01-08 RX ORDER — ENOXAPARIN SODIUM 100 MG/ML
40 INJECTION SUBCUTANEOUS EVERY 12 HOURS
Refills: 0 | Status: DISCONTINUED | OUTPATIENT
Start: 2021-01-08 | End: 2021-01-08

## 2021-01-08 RX ORDER — MAGNESIUM SULFATE 500 MG/ML
2 VIAL (ML) INJECTION
Refills: 0 | Status: COMPLETED | OUTPATIENT
Start: 2021-01-08 | End: 2021-01-08

## 2021-01-08 RX ADMIN — Medication 1 TABLET(S): at 04:35

## 2021-01-08 RX ADMIN — Medication 1 PACKET(S): at 16:35

## 2021-01-08 RX ADMIN — Medication 1 PACKET(S): at 04:35

## 2021-01-08 RX ADMIN — Medication 50 MILLIGRAM(S): at 16:34

## 2021-01-08 RX ADMIN — Medication 50 MILLIGRAM(S): at 04:35

## 2021-01-08 RX ADMIN — AMLODIPINE BESYLATE 5 MILLIGRAM(S): 2.5 TABLET ORAL at 04:35

## 2021-01-08 RX ADMIN — Medication 50 GRAM(S): at 08:23

## 2021-01-08 RX ADMIN — ENOXAPARIN SODIUM 100 MILLIGRAM(S): 100 INJECTION SUBCUTANEOUS at 16:38

## 2021-01-08 RX ADMIN — Medication 50 GRAM(S): at 10:19

## 2021-01-08 RX ADMIN — ENOXAPARIN SODIUM 40 MILLIGRAM(S): 100 INJECTION SUBCUTANEOUS at 11:30

## 2021-01-08 RX ADMIN — Medication 1 TABLET(S): at 16:34

## 2021-01-08 RX ADMIN — PANTOPRAZOLE SODIUM 40 MILLIGRAM(S): 20 TABLET, DELAYED RELEASE ORAL at 11:29

## 2021-01-08 RX ADMIN — Medication 1 APPLICATION(S): at 04:35

## 2021-01-08 RX ADMIN — Medication 1 APPLICATION(S): at 16:39

## 2021-01-08 RX ADMIN — CHLORHEXIDINE GLUCONATE 1 APPLICATION(S): 213 SOLUTION TOPICAL at 11:29

## 2021-01-08 NOTE — CHART NOTE - NSCHARTNOTEFT_GEN_A_CORE
PRE-INTERVENTIONAL RADIOLOGY PROCEDURE NOTE    Patient Age: 60  Patient Gender: F  Procedure (including site / side if known): PEG tube insertion  Diagnosis / Indication: Vented   Interventional Radiology Attending Physician: Dr Rodriguez   Ordering Attending Physician: Dr Mcnulty   Pertinent medical history: s/p covid with AHRF, intubated trach/NGT unable to place PEG sec to large ventral hernia   Pertinent labs: WBC 9 hgb 8.5 bun 8 Cr .24   Patient and Family aware? Yes     RAUL Smith  Contact #: [phone]

## 2021-01-08 NOTE — PROGRESS NOTE ADULT - ATTENDING COMMENTS
Patient seen and examined at bedside. 60 F here with acute hypoxemic respiratory failure / ARDS 2/2 COVID19 PNA further c/b superimposed MSSA cavitary PNA and ARF requiring HD. Pt with failure to wean from mechanical ventilation, s/p tracheostomy placement 12/18 and transfer to RCU 12/28.     1. Neuro - Encephalopathy improved. Remains off all IV sedation since 12/24. Diazepam discontinued 1/7 and methadone discontinued 1/8. CT Head (-) 12/12.  EEG (-) 12/29.    2. Pulm - acute respiratory failure, s/p trach placement by CTSx (proximal 7XLT 12/18 and then changed to 8Fr Bivona 12/31 for persistent air leak), mechanical vent dependence and difficulty weaning. Continue with SBTs as tolerated, airway clearance therapy and trach care.    3. ID - MSSA bacteremia, cavitary PNA s/p completion of antibiotics with Primaxin on 12/28. Recurrent fevers and leukocytosis, growing resistant Stenotrophomonas in sputum. Completed course of ceftazidime 1/5. Now monitored off abx.    4. GI - oropharayngeal dysphagia - tolerating feeds via NGT, unable to have PEG placed initially given large ventral hernia. Plan for IR guided PEG placement Monday 1/11.    5. Renal - initially ARF on CRRT, following by intermittent HD with renal recovery.     6. Endo - DM - NPH and sliding scale for glucose control.     7. Skin - Wound consult and plastics input appreciated.    8. Heme - Previously on A/C for history of VTE. On hold for bleeding from tracheostomy site, which has resolved. Will restart A/C.    9. Dispo - Full code. PT/OT.

## 2021-01-08 NOTE — PROGRESS NOTE ADULT - ASSESSMENT
This is a 60 year old Female with a PMH of HTN, DVT on Xarelto, peritonitis s/p Oral's procedure and ileostomy (reversed 2011), AARON who presented to [facility] on 11/18  w/ fevers found to have COVID-19, intubated 11/23, course complicated by superimposed PNA and bacteremia with Stenotrophomonas (12/11, completed Levaquin x7 days), MSSA/P. mirablis bacteremia (on Cefazolin, potentially due to urine vs line-associated and MSSA in sputum due to PNA), also with lung abscesses on CT chest on 12/5 , ARTEMIO/ATN requiring CRRT and HD. Now unable to wean from ventilator and transferred from [facility] on 12/10 to offload COVID unit. Unable to wean from ventilator, s/p tracheostmy with ctsx 12/18 planned for PEG 12/24 but unable to perform due to large ventral hernia     # AMS  - Alert & oriented x3 @baseline.   - Pt is able to opens eyes to voice but does not follow any commands. No purposeful movements appreciated.   - c/w Methadone and Valium 5mg TID, wean slowly as tolerated  - Spot EEG - abnormal study- Moderate nonspecific diffuse or multifocal cerebral dysfunction   - Will consider MRI   - Methadone and Valium decreased to bid 1/3; wean again  1/5 methadone to daily  - More awake tracking   - Answering yes/no questions. follows simple commands   - valium dc , methadone daily   - PT/OT seeing pt more alert/following commands     # Acute hypoxemic respiratory failure 2/2 covid-19 PNA / MSSA Bacteremia/Pneumonia,  - s/p intubated 11/23. Course complicated by MSSA pneumonia, stenotrophomonas, p. mirabalis, and lung abscess on CT chest from 12/5.   - Pt has been unable to wean from ventilator, s/p Trached by CTSx on 12/18. Now with significant positional air leak from tracheotomy    - Plan for CTSx to remove trach sutures on 1/1/21  - Pt noted to have large leak. Discussed plan for possible trach upsize with CTSx.   - s/p Imipenem (12/16- 12/28) 500mg q12h x 2 weeks for cavitation and MSSA,  - Will switch Levaquin to Ceftazidime starting today (12/29 - ...) for 7 days course (due to resistance to Levaquin) until 1/5/2021  - To OR today with THoracic- and trach change to 8Fr Bivona w/cannula at 13.5cm  - Some bleeding noted when suctioned but improved - still bloody secretions. Sequentials ordered   - Lovenox daily started     # Hypernatremia  - Na 155>>157>>152, s/p D5W 50cc/hr for few hours, and Free water [id number]  - Will increase free water to 450 q 4 - and d'shagufta D5W for now  - Will monitor -Na+ 149 today - continue free water   - Na improving , free water lowered 1/1/21 Na 146 1/2/2021 - 143   - Sodium normalized   - Plan for TOV tonight 1/4passed and voiding     #Hypokalemia  - 2.8 on 1/3 and repleted  - Monitor K+ and replete as needed  - 1/5 K 3.8  - 1/6 Mg/K+ repleted      # Hx of DVT (on Xarelto at home)  - s/p heparin gtt in Surg B, but was stopped due to low H/H requiring transfusion of PRBC    - c/w home metoprolol and Amlodipine to better monitor Blood pressure    - CTA negative for PE  - Lovenox restarted give one dose - npo after MN for trach exchange some bleeding from trach  Sequentials ordered   Lovenox daily restarted follow secretions   - Hold lovenox on sunday for planned PEG on Monday then consider resuming to BID dose     # ARTEMIO 2/2 ATN   - s/p CRRT, and Bumex and intermittent HD.   - Last HD 12/17, Bumex gtt d/c'ed. Now with good UOP   - c/w Rivera for now, will consider TOV in the near future  - Strict I’s and O’s    # Diet  - Pt is not a candidate for Percutaneous Gastrostomy due to large ventral hernia as per Dr. Cruz  - General surgery to evaluate for Possible Open Gastrostomy tube  - c/w NGT feeding for now, tolerating well  - c/w bowel regimen  - c/w NPH q6hr, and ISS - Monitor FS routinely  - FS accucheck <100 past 24 h -Stop NPH and monitor - still controlled off NPH for now   - IR consulted for PEG placement as per surgery will not be able to go to OR as pt has ventral hernias making her high risk.  - Reconsult IR for PEG - they are agreeable for Monday - labs , npo after mn, hold lovenox sunday     #HTN   -amlodipine /metorpolol   - Monitor bp with parameters     #Pressure Ulcer  - Plastic surgery for chin necrosis when more stable   - Continue wound recs for now   - Plastics consulted - rec santyl to cheek/chin area bid and will follow     #Dispo   - RCU   - PT/OT - PT/OT reconsulted and pt on program   - DC most likely to vent rehab facility    This is a 60 year old Female with a PMH of HTN, DVT on Xarelto, peritonitis s/p Oral's procedure and ileostomy (reversed 2011), AARON who presented to [facility] on 11/18  w/ fevers found to have COVID-19, intubated 11/23, course complicated by superimposed PNA and bacteremia with Stenotrophomonas (12/11, completed Levaquin x7 days), MSSA/P. mirablis bacteremia (on Cefazolin, potentially due to urine vs line-associated and MSSA in sputum due to PNA), also with lung abscesses on CT chest on 12/5 , ARTEMIO/ATN requiring CRRT and HD. Now unable to wean from ventilator and transferred from [facility] on 12/10 to offload COVID unit. Unable to wean from ventilator, s/p tracheostmy with ctsx 12/18 planned for PEG 12/24 but unable to perform due to large ventral hernia     # AMS  - Alert & oriented x3 @baseline.   - Pt is able to opens eyes to voice but does not follow any commands. No purposeful movements appreciated.   - c/w Methadone and Valium 5mg TID, wean slowly as tolerated  - Spot EEG - abnormal study- Moderate nonspecific diffuse or multifocal cerebral dysfunction   - Will consider MRI   - Methadone and Valium decreased to bid 1/3; wean again  1/5 methadone to daily  - More awake tracking   - Answering yes/no questions. follows simple commands   - valium dc , methadone dc   - PT/OT seeing pt more alert/following commands     # Acute hypoxemic respiratory failure 2/2 covid-19 PNA / MSSA Bacteremia/Pneumonia,  - s/p intubated 11/23. Course complicated by MSSA pneumonia, stenotrophomonas, p. mirabalis, and lung abscess on CT chest from 12/5.   - Pt has been unable to wean from ventilator, s/p Trached by CTSx on 12/18. Now with significant positional air leak from tracheotomy    - Plan for CTSx to remove trach sutures on 1/1/21  - Pt noted to have large leak. Discussed plan for possible trach upsize with CTSx.   - s/p Imipenem (12/16- 12/28) 500mg q12h x 2 weeks for cavitation and MSSA,  - Will switch Levaquin to Ceftazidime starting today (12/29 - ...) for 7 days course (due to resistance to Levaquin) until 1/5/2021  - To OR today with THoracic- and trach change to 8Fr Bivona w/cannula at 13.5cm  - Some bleeding noted when suctioned but improved - still bloody secretions. Sequentials ordered   - Lovenox therapuetic restarted hold after sunday dose for IR PEG and resume when cleared by them      # Hypernatremia  - Na 155>>157>>152, s/p D5W 50cc/hr for few hours, and Free water [id number]  - Will increase free water to 450 q 4 - and d'shagufta D5W for now  - Will monitor -Na+ 149 today - continue free water   - Na improving , free water lowered 1/1/21 Na 146 1/2/2021 - 143   - Sodium normalized   - Plan for TOV tonight 1/4passed and voiding     #Hypokalemia  - 2.8 on 1/3 and repleted  - Monitor K+ and replete as needed  - 1/5 K 3.8  - 1/6 Mg/K+ repleted      # Hx of DVT (on Xarelto at home)  - s/p heparin gtt in Surg B, but was stopped due to low H/H requiring transfusion of PRBC    - c/w home metoprolol and Amlodipine to better monitor Blood pressure    - CTA negative for PE  - Lovenox restarted give one dose - npo after MN for trach exchange some bleeding from trach  Sequentials ordered   Lovenox daily restarted follow secretions   - Hold lovenox on sunday for planned PEG on Monday     # ARTEMIO 2/2 ATN   - s/p CRRT, and Bumex and intermittent HD.   - Last HD 12/17, Bumex gtt d/c'ed. Now with good UOP   - c/w Rivera for now, will consider TOV in the near future  - Strict I’s and O’s    # Diet  - Pt is not a candidate for Percutaneous Gastrostomy due to large ventral hernia as per Dr. Cruz  - General surgery to evaluate for Possible Open Gastrostomy tube  - c/w NGT feeding for now, tolerating well  - c/w bowel regimen  - c/w NPH q6hr, and ISS - Monitor FS routinely  - FS accucheck <100 past 24 h -Stop NPH and monitor - still controlled off NPH for now   - IR consulted for PEG placement as per surgery will not be able to go to OR as pt has ventral hernias making her high risk.  - Reconsult IR for PEG - they are agreeable for Monday - labs , npo after mn, hold lovenox sunday     #HTN   -amlodipine /metorpolol   - Monitor bp with parameters     #Pressure Ulcer  - Plastic surgery for chin necrosis when more stable   - Continue wound recs for now   - Plastics consulted - rec santyl to cheek/chin area bid and will follow     #Dispo   - RCU   - PT/OT - PT/OT reconsulted and pt on program   - DC most likely to vent rehab facility

## 2021-01-08 NOTE — CONSULT NOTE ADULT - SUBJECTIVE AND OBJECTIVE BOX
Patient is a 60y old  Female who presents with a chief complaint of Transfer from [facility] (2021 07:05)    HPI:  Patient is a 61yo F w/ AARON, peritonitis s/p Oral's procedure and ileostomy (reversed ), HTN, prior DVT/PE, ?Asthma admitted on 2020 to [facility] after presenting for  productive cough  w/ SOB x9 days and diarrhea x7 days and fever x1 day. Patient was found to be COVID + on 2020 and completed remdesivir -. Patient was intubated for airway protection on 2020. Patient has required proning (last 2020). Hospital course c/b by ATN, requiring CVVHD now on intermittent HD, unresponsive to bumex challenge last HD . Course also c/b Stenotrophomonas (, completed Levaquin x7 days), MSSA/P. mirablis bacteremia (on Cefazolin, potentially due to urine vs line-associated and MSSA in sputum due to PNA), also with lung abscesses on CT chest on .  Patient currently on Volume AC 30/350/8/35% on Precedex. Patient intermittently opens eyes but is not yet responsive. Transferred over to [facility] on 12/10 to offload [facility] COVID ICU     REVIEW OF SYSTEMS:  Unable to assess (pt trached/vented)    PAST MEDICAL & SURGICAL HISTORY:  Pneumomediastinum  Umbilical hernia  Reduciable  Osteoarthritis of both knees, unspecified osteoarthritis type  AARON (Obstructive Sleep Apnea)  category II pt uses c/pap pt to bering to hospital/  OR booking notified  Pneumonia   GERD (Gastroesophageal Reflux Disease)  Asthma  diagnosed   on adbvair denies attacks  DVT (Deep Venous Thrombosis)  left leg 6 m s/p knee replacement in   HTN (Hypertension)  H/O ileostomy  Ileostomy Revesal   S/P LEEP   S/P Exploratory Laparotomy    peritonitis  s/p right knee replacement/ colon resection and ileostomy  S/P Colonoscopy   S/P Endoscopy  gerd  S/P  Section   History of Tubal Ligation   s/p c/section   S/P Knee Surgery 2010    Allergies  Kiwi (Unknown)  latex (Anaphylaxis)  penicillin (Unknown)  perfume  hives (Other)  potassium acetate (Other)  soap additives hives (Other)    MEDICATIONS  (STANDING):  amLODIPine   Tablet 5 milliGRAM(s) Oral daily  chlorhexidine 4% Liquid 1 Application(s) Topical daily  collagenase Ointment 1 Application(s) Topical two times a day  enoxaparin Injectable 100 milliGRAM(s) SubCutaneous every 12 hours  insulin lispro (ADMELOG) corrective regimen sliding scale   SubCutaneous every 6 hours  lactobacillus acidophilus 1 Tablet(s) Oral two times a day  metoprolol tartrate 50 milliGRAM(s) Oral two times a day  pantoprazole  Injectable 40 milliGRAM(s) IV Push daily  psyllium Powder 1 Packet(s) Oral two times a day    MEDICATIONS  (PRN):  acetaminophen    Suspension .. 650 milliGRAM(s) Oral every 6 hours PRN Temp greater or equal to 38C (100.4F)    FAMILY HISTORY:  Family history of gastric cancer  Family history of breast cancer (Grandparent)    PHYSICAL EXAM:    Vital Signs Last 24 Hrs  T(C): 36.3 (2021 04:28), Max: 36.6 (2021 21:10)  T(F): 97.3 (2021 04:28), Max: 97.9 (2021 21:10)  HR: 96 (2021 12:33) (80 - 96)  BP: 138/50 (2021 04:28) (137/55 - 143/63)  BP(mean): --  RR: 18 (2021 04:28) (18 - 22)  SpO2: 97% (2021 12:33) (97% - 99%)    General:  Trached/vented  HEENT:  NC/AT, conjunctivae clear    LABS:                        8.5    9.10  )-----------( 406      ( 2021 06:38 )             29.6     01-08    138  |  98  |  8   ----------------------------<  108<H>  4.5   |  31  |  0.24<L>    Ca    8.6      2021 06:38  Phos  3.0     01-08  Mg     1.4     01-08      PT/INR - ( 2021 09:44 )   PT: 11.7 sec;   INR: 1.03 ratio         PTT - ( 2021 09:44 )  PTT:21.5 sec

## 2021-01-08 NOTE — PROGRESS NOTE ADULT - SUBJECTIVE AND OBJECTIVE BOX
CHIEF COMPLAINT: Patient is a 60y old  Female who presents with a chief complaint of Transfer from [facility] (07 Jan 2021 07:07)      Interval Events: Pt seen and evaluated at bedside with team.  Weaning trials in progress.  Pt scheduled for PEG tube on Monday with IR     REVIEW OF SYSTEMS:  Constitutional:   Eyes:  ENT:  CV:  Resp:  GI:  :  MSK:  Integumentary:  Neurological:  Psychiatric:  Endocrine:  Hematologic/Lymphatic:  Allergic/Immunologic:  [ ] All other systems negative  [ ] Unable to assess ROS because ________    OBJECTIVE:  ICU Vital Signs Last 24 Hrs  T(C): 36.3 (08 Jan 2021 04:28), Max: 36.6 (07 Jan 2021 21:10)  T(F): 97.3 (08 Jan 2021 04:28), Max: 97.9 (07 Jan 2021 21:10)  HR: 84 (08 Jan 2021 04:28) (80 - 88)  BP: 138/50 (08 Jan 2021 04:28) (122/62 - 143/63)  BP(mean): --  ABP: --  ABP(mean): --  RR: 18 (08 Jan 2021 04:28) (18 - 28)  SpO2: 98% (08 Jan 2021 04:28) (97% - 100%)    Mode: AC/ CMV (Assist Control/ Continuous Mandatory Ventilation), RR (machine): 12, TV (machine): 450, FiO2: 40, PEEP: 5, ITime: 0.8, MAP: 15, PC: 25, PIP: 39    01-07 @ 07:01  -  01-08 @ 07:00  --------------------------------------------------------  IN: 1160 mL / OUT: 0 mL / NET: 1160 mL      CAPILLARY BLOOD GLUCOSE      POCT Blood Glucose.: 109 mg/dL (08 Jan 2021 05:16)        HOSPITAL MEDICATIONS:  MEDICATIONS  (STANDING):  amLODIPine   Tablet 5 milliGRAM(s) Oral daily  chlorhexidine 4% Liquid 1 Application(s) Topical daily  collagenase Ointment 1 Application(s) Topical two times a day  enoxaparin Injectable 40 milliGRAM(s) SubCutaneous daily  insulin lispro (ADMELOG) corrective regimen sliding scale   SubCutaneous every 6 hours  lactobacillus acidophilus 1 Tablet(s) Oral two times a day  methadone   Solution 5 milliGRAM(s) Oral daily  metoprolol tartrate 50 milliGRAM(s) Oral two times a day  pantoprazole  Injectable 40 milliGRAM(s) IV Push daily  psyllium Powder 1 Packet(s) Oral two times a day    MEDICATIONS  (PRN):  acetaminophen    Suspension .. 650 milliGRAM(s) Oral every 6 hours PRN Temp greater or equal to 38C (100.4F)      LABS:                        8.5    9.10  )-----------( 406      ( 08 Jan 2021 06:38 )             29.6     01-07    137  |  99  |  8   ----------------------------<  122<H>  4.1   |  32<H>  |  0.24<L>    Ca    8.3<L>      07 Jan 2021 06:32  Phos  2.5     01-07  Mg     1.5     01-07                MICROBIOLOGY:     RADIOLOGY:  [ ] Reviewed and interpreted by me    PULMONARY FUNCTION TESTS:    EKG: CHIEF COMPLAINT: Patient is a 60y old  Female who presents with a chief complaint of Transfer from [facility] (07 Jan 2021 07:07)      Interval Events: Pt seen and evaluated at bedside with team.  Weaning trials in progress.  Pt scheduled for PEG tube on Monday with IR     REVIEW OF SYSTEMS: Pt with AMS/ shakes head   Constitutional: Denies pain   [ x] Unable to assess ROS because AMS  OBJECTIVE:  ICU Vital Signs Last 24 Hrs  T(C): 36.3 (08 Jan 2021 04:28), Max: 36.6 (07 Jan 2021 21:10)  T(F): 97.3 (08 Jan 2021 04:28), Max: 97.9 (07 Jan 2021 21:10)  HR: 84 (08 Jan 2021 04:28) (80 - 88)  BP: 138/50 (08 Jan 2021 04:28) (122/62 - 143/63)  BP(mean): --  ABP: --  ABP(mean): --  RR: 18 (08 Jan 2021 04:28) (18 - 28)  SpO2: 98% (08 Jan 2021 04:28) (97% - 100%)    Mode: AC/ CMV (Assist Control/ Continuous Mandatory Ventilation), RR (machine): 12, TV (machine): 450, FiO2: 40, PEEP: 5, ITime: 0.8, MAP: 15, PC: 25, PIP: 39    01-07 @ 07:01  -  01-08 @ 07:00  --------------------------------------------------------  IN: 1160 mL / OUT: 0 mL / NET: 1160 mL      CAPILLARY BLOOD GLUCOSE      POCT Blood Glucose.: 109 mg/dL (08 Jan 2021 05:16)        HOSPITAL MEDICATIONS:  MEDICATIONS  (STANDING):  amLODIPine   Tablet 5 milliGRAM(s) Oral daily  chlorhexidine 4% Liquid 1 Application(s) Topical daily  collagenase Ointment 1 Application(s) Topical two times a day  enoxaparin Injectable 40 milliGRAM(s) SubCutaneous daily  insulin lispro (ADMELOG) corrective regimen sliding scale   SubCutaneous every 6 hours  lactobacillus acidophilus 1 Tablet(s) Oral two times a day  methadone   Solution 5 milliGRAM(s) Oral daily  metoprolol tartrate 50 milliGRAM(s) Oral two times a day  pantoprazole  Injectable 40 milliGRAM(s) IV Push daily  psyllium Powder 1 Packet(s) Oral two times a day    MEDICATIONS  (PRN):  acetaminophen    Suspension .. 650 milliGRAM(s) Oral every 6 hours PRN Temp greater or equal to 38C (100.4F)      LABS:                        8.5    9.10  )-----------( 406      ( 08 Jan 2021 06:38 )             29.6     01-07    137  |  99  |  8   ----------------------------<  122<H>  4.1   |  32<H>  |  0.24<L>    Ca    8.3<L>      07 Jan 2021 06:32  Phos  2.5     01-07  Mg     1.5     01-07                MICROBIOLOGY:     RADIOLOGY:  [ ] Reviewed and interpreted by me    PULMONARY FUNCTION TESTS:    EKG:

## 2021-01-09 LAB
ANION GAP SERPL CALC-SCNC: 12 MMOL/L — SIGNIFICANT CHANGE UP (ref 7–14)
BUN SERPL-MCNC: 8 MG/DL — SIGNIFICANT CHANGE UP (ref 7–23)
CALCIUM SERPL-MCNC: 8.9 MG/DL — SIGNIFICANT CHANGE UP (ref 8.4–10.5)
CHLORIDE SERPL-SCNC: 103 MMOL/L — SIGNIFICANT CHANGE UP (ref 98–107)
CO2 SERPL-SCNC: 30 MMOL/L — SIGNIFICANT CHANGE UP (ref 22–31)
CREAT SERPL-MCNC: 0.23 MG/DL — LOW (ref 0.5–1.3)
GLUCOSE BLDC GLUCOMTR-MCNC: 108 MG/DL — HIGH (ref 70–99)
GLUCOSE BLDC GLUCOMTR-MCNC: 114 MG/DL — HIGH (ref 70–99)
GLUCOSE BLDC GLUCOMTR-MCNC: 118 MG/DL — HIGH (ref 70–99)
GLUCOSE BLDC GLUCOMTR-MCNC: 128 MG/DL — HIGH (ref 70–99)
GLUCOSE SERPL-MCNC: 112 MG/DL — HIGH (ref 70–99)
HCT VFR BLD CALC: 29.3 % — LOW (ref 34.5–45)
HGB BLD-MCNC: 8.2 G/DL — LOW (ref 11.5–15.5)
MAGNESIUM SERPL-MCNC: 1.6 MG/DL — SIGNIFICANT CHANGE UP (ref 1.6–2.6)
MCHC RBC-ENTMCNC: 28 GM/DL — LOW (ref 32–36)
MCHC RBC-ENTMCNC: 28.4 PG — SIGNIFICANT CHANGE UP (ref 27–34)
MCV RBC AUTO: 101.4 FL — HIGH (ref 80–100)
NRBC # BLD: 1 /100 WBCS — SIGNIFICANT CHANGE UP
NRBC # FLD: 0.1 K/UL — HIGH
PHOSPHATE SERPL-MCNC: 3.7 MG/DL — SIGNIFICANT CHANGE UP (ref 2.5–4.5)
PLATELET # BLD AUTO: 523 K/UL — HIGH (ref 150–400)
POTASSIUM SERPL-MCNC: 3.9 MMOL/L — SIGNIFICANT CHANGE UP (ref 3.5–5.3)
POTASSIUM SERPL-SCNC: 3.9 MMOL/L — SIGNIFICANT CHANGE UP (ref 3.5–5.3)
RBC # BLD: 2.89 M/UL — LOW (ref 3.8–5.2)
RBC # FLD: 19.2 % — HIGH (ref 10.3–14.5)
SODIUM SERPL-SCNC: 145 MMOL/L — SIGNIFICANT CHANGE UP (ref 135–145)
WBC # BLD: 10.24 K/UL — SIGNIFICANT CHANGE UP (ref 3.8–10.5)
WBC # FLD AUTO: 10.24 K/UL — SIGNIFICANT CHANGE UP (ref 3.8–10.5)

## 2021-01-09 PROCEDURE — 71045 X-RAY EXAM CHEST 1 VIEW: CPT | Mod: 26

## 2021-01-09 PROCEDURE — 99233 SBSQ HOSP IP/OBS HIGH 50: CPT | Mod: GC

## 2021-01-09 RX ADMIN — Medication 50 MILLIGRAM(S): at 05:12

## 2021-01-09 RX ADMIN — Medication 1 APPLICATION(S): at 05:12

## 2021-01-09 RX ADMIN — Medication 1 TABLET(S): at 18:11

## 2021-01-09 RX ADMIN — PANTOPRAZOLE SODIUM 40 MILLIGRAM(S): 20 TABLET, DELAYED RELEASE ORAL at 11:23

## 2021-01-09 RX ADMIN — ENOXAPARIN SODIUM 100 MILLIGRAM(S): 100 INJECTION SUBCUTANEOUS at 18:10

## 2021-01-09 RX ADMIN — AMLODIPINE BESYLATE 5 MILLIGRAM(S): 2.5 TABLET ORAL at 05:12

## 2021-01-09 RX ADMIN — Medication 1 PACKET(S): at 18:11

## 2021-01-09 RX ADMIN — Medication 1 APPLICATION(S): at 18:11

## 2021-01-09 RX ADMIN — CHLORHEXIDINE GLUCONATE 1 APPLICATION(S): 213 SOLUTION TOPICAL at 11:23

## 2021-01-09 RX ADMIN — Medication 1 PACKET(S): at 05:12

## 2021-01-09 RX ADMIN — Medication 650 MILLIGRAM(S): at 18:10

## 2021-01-09 RX ADMIN — Medication 1 TABLET(S): at 05:12

## 2021-01-09 RX ADMIN — ENOXAPARIN SODIUM 100 MILLIGRAM(S): 100 INJECTION SUBCUTANEOUS at 05:12

## 2021-01-09 RX ADMIN — Medication 1 MILLIGRAM(S): at 18:35

## 2021-01-09 RX ADMIN — Medication 50 MILLIGRAM(S): at 18:11

## 2021-01-09 NOTE — PROGRESS NOTE ADULT - SUBJECTIVE AND OBJECTIVE BOX
CHIEF COMPLAINT: Patient is a 60y old  Female who presents with a chief complaint of Transfer from [facility] (08 Jan 2021 15:32)     Interval Events:    REVIEW OF SYSTEMS:  Constitutional:   Eyes:  ENT:  CV:  Resp:  GI:  :  MSK:  Integumentary:  Neurological:  Psychiatric:  Endocrine:  Hematologic/Lymphatic:  Allergic/Immunologic:  [ ] All other systems negative  [ ] Unable to assess ROS because ________    OBJECTIVE:  ICU Vital Signs Last 24 Hrs  T(C): 36.6 (09 Jan 2021 05:10), Max: 36.6 (09 Jan 2021 05:10)  T(F): 97.8 (09 Jan 2021 05:10), Max: 97.8 (09 Jan 2021 05:10)  HR: 81 (09 Jan 2021 06:23) (81 - 96)  BP: 151/69 (09 Jan 2021 05:10) (144/59 - 151/69)  BP(mean): --  ABP: --  ABP(mean): --  RR: 19 (09 Jan 2021 05:10) (19 - 20)  SpO2: 99% (09 Jan 2021 06:23) (97% - 100%)    Mode: AC/ CMV (Assist Control/ Continuous Mandatory Ventilation), RR (machine): 12, TV (machine): 450, FiO2: 40, PEEP: 6, MAP: 20, PIP: 36    01-08 @ 07:01  -  01-09 @ 07:00  --------------------------------------------------------  IN: 2480 mL / OUT: 2900 mL / NET: -420 mL      CAPILLARY BLOOD GLUCOSE      POCT Blood Glucose.: 118 mg/dL (09 Jan 2021 05:01)      PHYSICAL EXAM:  General:   HEENT:   Lymph Nodes:  Neck:   Respiratory:   Cardiovascular:   Abdomen:   Extremities:   Skin:   Neurological:  Psychiatry:    HOSPITAL MEDICATIONS:  MEDICATIONS  (STANDING):  amLODIPine   Tablet 5 milliGRAM(s) Oral daily  chlorhexidine 4% Liquid 1 Application(s) Topical daily  collagenase Ointment 1 Application(s) Topical two times a day  enoxaparin Injectable 100 milliGRAM(s) SubCutaneous every 12 hours  insulin lispro (ADMELOG) corrective regimen sliding scale   SubCutaneous every 6 hours  lactobacillus acidophilus 1 Tablet(s) Oral two times a day  metoprolol tartrate 50 milliGRAM(s) Oral two times a day  pantoprazole  Injectable 40 milliGRAM(s) IV Push daily  psyllium Powder 1 Packet(s) Oral two times a day    MEDICATIONS  (PRN):  acetaminophen    Suspension .. 650 milliGRAM(s) Oral every 6 hours PRN Temp greater or equal to 38C (100.4F)      LABS:                        8.2    10.24 )-----------( 523      ( 09 Jan 2021 06:43 )             29.3     01-09    145  |  103  |  8   ----------------------------<  112<H>  3.9   |  30  |  0.23<L>    Ca    8.9      09 Jan 2021 06:43  Phos  3.7     01-09  Mg     1.6     01-09      PT/INR - ( 08 Jan 2021 09:44 )   PT: 11.7 sec;   INR: 1.03 ratio         PTT - ( 08 Jan 2021 09:44 )  PTT:21.5 sec          MICROBIOLOGY:     RADIOLOGY:  [ ] Reviewed and interpreted by me    PULMONARY FUNCTION TESTS:    EKG: CHIEF COMPLAINT: Patient is a 60y old  Female who presents with a chief complaint of Transfer from [facility] (08 Jan 2021 15:32)     Interval Events: None reported overnight. Pt seen and examined at bedside. NAD. No new complaints. Vitals signs and medications reviewed. Plan for PEG by IR on Monday. Will make patient NPO by Sunday night and Hold Full dose Lovenox.     REVIEW OF SYSTEMS:    [x] Unable to assess ROS because AMS    OBJECTIVE:  ICU Vital Signs Last 24 Hrs  T(C): 36.6 (09 Jan 2021 05:10), Max: 36.6 (09 Jan 2021 05:10)  T(F): 97.8 (09 Jan 2021 05:10), Max: 97.8 (09 Jan 2021 05:10)  HR: 81 (09 Jan 2021 06:23) (81 - 96)  BP: 151/69 (09 Jan 2021 05:10) (144/59 - 151/69)  BP(mean): --  ABP: --  ABP(mean): --  RR: 19 (09 Jan 2021 05:10) (19 - 20)  SpO2: 99% (09 Jan 2021 06:23) (97% - 100%)    Mode: AC/ CMV (Assist Control/ Continuous Mandatory Ventilation), RR (machine): 12, TV (machine): 450, FiO2: 40, PEEP: 6, MAP: 20, PIP: 36    01-08 @ 07:01  -  01-09 @ 07:00  --------------------------------------------------------  IN: 2480 mL / OUT: 2900 mL / NET: -420 mL      CAPILLARY BLOOD GLUCOSE      POCT Blood Glucose.: 118 mg/dL (09 Jan 2021 05:01)      PHYSICAL EXAM  General: Laying in bed, nad, saturating well on Current vent settings  Neck: +trach to vent, area c/d/i  Respiratory: +coarse breath sounds b/l  Cardiovascular: +s1, s2   Abdomen: +BS, obese, soft, NT/ND  Extremities: +trace edema noted b/l  Skin: As per AAI sheet   Neurological: open eyes, able to track  Psychiatry: Appropriate    HOSPITAL MEDICATIONS:  MEDICATIONS  (STANDING):  amLODIPine   Tablet 5 milliGRAM(s) Oral daily  chlorhexidine 4% Liquid 1 Application(s) Topical daily  collagenase Ointment 1 Application(s) Topical two times a day  enoxaparin Injectable 100 milliGRAM(s) SubCutaneous every 12 hours  insulin lispro (ADMELOG) corrective regimen sliding scale   SubCutaneous every 6 hours  lactobacillus acidophilus 1 Tablet(s) Oral two times a day  metoprolol tartrate 50 milliGRAM(s) Oral two times a day  pantoprazole  Injectable 40 milliGRAM(s) IV Push daily  psyllium Powder 1 Packet(s) Oral two times a day    MEDICATIONS  (PRN):  acetaminophen    Suspension .. 650 milliGRAM(s) Oral every 6 hours PRN Temp greater or equal to 38C (100.4F)      LABS:                        8.2    10.24 )-----------( 523      ( 09 Jan 2021 06:43 )             29.3     01-09    145  |  103  |  8   ----------------------------<  112<H>  3.9   |  30  |  0.23<L>    Ca    8.9      09 Jan 2021 06:43  Phos  3.7     01-09  Mg     1.6     01-09      PT/INR - ( 08 Jan 2021 09:44 )   PT: 11.7 sec;   INR: 1.03 ratio         PTT - ( 08 Jan 2021 09:44 )  PTT:21.5 sec          MICROBIOLOGY:     RADIOLOGY:  [ ] Reviewed and interpreted by me    PULMONARY FUNCTION TESTS:    EKG:

## 2021-01-09 NOTE — PROGRESS NOTE ADULT - ASSESSMENT
This is a 60 year old Female with a PMH of HTN, DVT on Xarelto, peritonitis s/p Oral's procedure and ileostomy (reversed 2011), AARON who presented to [facility] on 11/18  w/ fevers found to have COVID-19, intubated 11/23, course complicated by superimposed PNA and bacteremia with Stenotrophomonas (12/11, completed Levaquin x7 days), MSSA/P. mirablis bacteremia (on Cefazolin, potentially due to urine vs line-associated and MSSA in sputum due to PNA), also with lung abscesses on CT chest on 12/5 , ARTEMIO/ATN requiring CRRT and HD. Now unable to wean from ventilator and transferred from [facility] on 12/10 to offload COVID unit. Unable to wean from ventilator, s/p tracheostmy with ctsx 12/18 planned for PEG 12/24 but unable to perform due to large ventral hernia     # AMS  - Alert & oriented x3 @baseline.   - Pt is able to opens eyes to voice but does not follow any commands. No purposeful movements appreciated.   - c/w Methadone and Valium 5mg TID, wean slowly as tolerated  - Spot EEG - abnormal study- Moderate nonspecific diffuse or multifocal cerebral dysfunction   - Will consider MRI   - Methadone and Valium decreased to bid 1/3; wean again  1/5 methadone to daily  - More awake tracking   - Answering yes/no questions. follows simple commands   - valium dc , methadone dc   - PT/OT seeing pt more alert/following commands     # Acute hypoxemic respiratory failure 2/2 covid-19 PNA / MSSA Bacteremia/Pneumonia,  - s/p intubated 11/23. Course complicated by MSSA pneumonia, stenotrophomonas, p. mirabalis, and lung abscess on CT chest from 12/5.   - Pt has been unable to wean from ventilator, s/p Trached by CTSx on 12/18. Now with significant positional air leak from tracheotomy    - Plan for CTSx to remove trach sutures on 1/1/21  - Pt noted to have large leak. Discussed plan for possible trach upsize with CTSx.   - s/p Imipenem (12/16- 12/28) 500mg q12h x 2 weeks for cavitation and MSSA,  - Will switch Levaquin to Ceftazidime starting today (12/29 - ...) for 7 days course (due to resistance to Levaquin) until 1/5/2021  - To OR today with THoracic- and trach change to 8Fr Bivona w/cannula at 13.5cm  - Some bleeding noted when suctioned but improved - still bloody secretions. Sequentials ordered   - Lovenox therapuetic restarted hold after sunday dose for IR PEG and resume when cleared by them      # Hypernatremia  - Na 155>>157>>152, s/p D5W 50cc/hr for few hours, and Free water [id number]  - Will increase free water to 450 q 4 - and d'shagufta D5W for now  - Will monitor -Na+ 149 today - continue free water   - Na improving , free water lowered 1/1/21 Na 146 1/2/2021 - 143   - Sodium normalized   - Plan for TOV tonight 1/4passed and voiding     #Hypokalemia  - 2.8 on 1/3 and repleted  - Monitor K+ and replete as needed  - 1/5 K 3.8  - 1/6 Mg/K+ repleted      # Hx of DVT (on Xarelto at home)  - s/p heparin gtt in Surg B, but was stopped due to low H/H requiring transfusion of PRBC    - c/w home metoprolol and Amlodipine to better monitor Blood pressure    - CTA negative for PE  - Lovenox restarted give one dose - npo after MN for trach exchange some bleeding from trach  Sequentials ordered   Lovenox daily restarted follow secretions   - Hold lovenox on sunday for planned PEG on Monday     # ARTEMIO 2/2 ATN   - s/p CRRT, and Bumex and intermittent HD.   - Last HD 12/17, Bumex gtt d/c'ed. Now with good UOP   - c/w Rivera for now, will consider TOV in the near future  - Strict I’s and O’s    # Diet  - Pt is not a candidate for Percutaneous Gastrostomy due to large ventral hernia as per Dr. Cruz  - General surgery to evaluate for Possible Open Gastrostomy tube  - c/w NGT feeding for now, tolerating well  - c/w bowel regimen  - c/w NPH q6hr, and ISS - Monitor FS routinely  - FS accucheck <100 past 24 h -Stop NPH and monitor - still controlled off NPH for now   - IR consulted for PEG placement as per surgery will not be able to go to OR as pt has ventral hernias making her high risk.  - Reconsult IR for PEG - they are agreeable for Monday - labs , npo after mn, hold lovenox sunday     #HTN   -amlodipine /metorpolol   - Monitor bp with parameters     #Pressure Ulcer  - Plastic surgery for chin necrosis when more stable   - Continue wound recs for now   - Plastics consulted - rec santyl to cheek/chin area bid and will follow     #Dispo   - RCU   - PT/OT - PT/OT reconsulted and pt on program   - DC most likely to vent rehab facility    This is a 60 year old Female with a PMH of HTN, DVT on Xarelto, peritonitis s/p Oral's procedure and ileostomy (reversed 2011), AARON who presented to [facility] on 11/18  w/ fevers found to have COVID-19, intubated 11/23, course complicated by superimposed PNA and bacteremia with Stenotrophomonas (12/11, completed Levaquin x7 days), MSSA/P. mirablis bacteremia (on Cefazolin, potentially due to urine vs line-associated and MSSA in sputum due to PNA), also with lung abscesses on CT chest on 12/5 , ARTEMIO/ATN requiring CRRT and HD. Now unable to wean from ventilator and transferred from [facility] on 12/10 to offload COVID unit. Unable to wean from ventilator, s/p tracheostmy with ctsx 12/18 planned for PEG 12/24 but unable to perform due to large ventral hernia     # AMS  - Alert & oriented x3 @baseline.    - s/p wean off Methadone and Valium 5mg TID  - Spot EEG - abnormal study- Moderate nonspecific diffuse or multifocal cerebral dysfunction   - Answering yes/no questions. No purposeful movements appreciated.  - PT/OT seeing pt more alert/following commands     # Acute hypoxemic respiratory failure 2/2 covid-19 PNA / MSSA Bacteremia/Pneumonia,  - s/p intubated 11/23. Course complicated by MSSA pneumonia, stenotrophomonas, p. mirabalis, and lung abscess on CT chest from 12/5.   - Pt has been unable to wean from ventilator, s/p Trached by CTSx on 12/18..   - s/p Imipenem (12/16- 12/28) 500mg q12h x 2 weeks for cavitation and MSSA,  - s/p Ceftazidime total of 7 days (12/29 - 1/5) (due to resistance to Levaquin)  - s/p Trach exchange to 8Fr Bivona w Cannula at 13.5 cm by CTSx on 12/31 due to previous large air leaks  -  Plan for CTSx to remove trach sutures on 1/1/21    # Hypernatremia /#Hypokalemia resolved    # Hx of DVT (on Xarelto at home)  - CTA negative for PE  - On full dose Lovenox now-  - Will hold Lovenox on Matthew for planned PEG on Monday     # ARTEMIO 2/2 ATN   - s/p CRRT, and Bumex and intermittent HD.   - Last HD 12/17, Bumex gtt d/c'ed. Now with good UOP   - c/w Rivera for now, will consider TOV in the near future  - Strict I’s and O’s    # Diet  - c/w NGT feeding for now, tolerating well  - c/w bowel regimen  - c/w NPH q6hr, and ISS - Monitor FS routinely  - FS accucheck <100 past 24 h -Stop NPH and monitor - still controlled off NPH for now   - Reconsult IR for PEG - Plan Percutaneous PEG placement on Monday     #HTN   - c/w Amlodipine /Metoprolol   - Monitor BP with parameters     #Pressure Ulcer  - Continue wound recs for now   - Plastics consulted - rec santyl to cheek/chin area bid and will follow     #Dispo   - RCU   - PT/OT - PT/OT reconsulted and PT on program   - d/c most likely to vent rehab facility

## 2021-01-10 LAB
ANION GAP SERPL CALC-SCNC: 9 MMOL/L — SIGNIFICANT CHANGE UP (ref 7–14)
ANION GAP SERPL CALC-SCNC: 9 MMOL/L — SIGNIFICANT CHANGE UP (ref 7–14)
BUN SERPL-MCNC: 10 MG/DL — SIGNIFICANT CHANGE UP (ref 7–23)
BUN SERPL-MCNC: 10 MG/DL — SIGNIFICANT CHANGE UP (ref 7–23)
CALCIUM SERPL-MCNC: 8.6 MG/DL — SIGNIFICANT CHANGE UP (ref 8.4–10.5)
CALCIUM SERPL-MCNC: 8.7 MG/DL — SIGNIFICANT CHANGE UP (ref 8.4–10.5)
CHLORIDE SERPL-SCNC: 102 MMOL/L — SIGNIFICANT CHANGE UP (ref 98–107)
CHLORIDE SERPL-SCNC: 103 MMOL/L — SIGNIFICANT CHANGE UP (ref 98–107)
CO2 SERPL-SCNC: 31 MMOL/L — SIGNIFICANT CHANGE UP (ref 22–31)
CO2 SERPL-SCNC: 32 MMOL/L — HIGH (ref 22–31)
CREAT SERPL-MCNC: 0.28 MG/DL — LOW (ref 0.5–1.3)
CREAT SERPL-MCNC: 0.29 MG/DL — LOW (ref 0.5–1.3)
GLUCOSE SERPL-MCNC: 112 MG/DL — HIGH (ref 70–99)
GLUCOSE SERPL-MCNC: 115 MG/DL — HIGH (ref 70–99)
GRAM STN FLD: SIGNIFICANT CHANGE UP
HCT VFR BLD CALC: 30.9 % — LOW (ref 34.5–45)
HGB BLD-MCNC: 8.6 G/DL — LOW (ref 11.5–15.5)
MAGNESIUM SERPL-MCNC: 1.4 MG/DL — LOW (ref 1.6–2.6)
MAGNESIUM SERPL-MCNC: 2.1 MG/DL — SIGNIFICANT CHANGE UP (ref 1.6–2.6)
MCHC RBC-ENTMCNC: 27.8 GM/DL — LOW (ref 32–36)
MCHC RBC-ENTMCNC: 28.4 PG — SIGNIFICANT CHANGE UP (ref 27–34)
MCV RBC AUTO: 102 FL — HIGH (ref 80–100)
NRBC # BLD: 1 /100 WBCS — SIGNIFICANT CHANGE UP
NRBC # FLD: 0.09 K/UL — HIGH
PHOSPHATE SERPL-MCNC: 3.4 MG/DL — SIGNIFICANT CHANGE UP (ref 2.5–4.5)
PHOSPHATE SERPL-MCNC: 3.5 MG/DL — SIGNIFICANT CHANGE UP (ref 2.5–4.5)
PLATELET # BLD AUTO: 506 K/UL — HIGH (ref 150–400)
POTASSIUM SERPL-MCNC: 3.9 MMOL/L — SIGNIFICANT CHANGE UP (ref 3.5–5.3)
POTASSIUM SERPL-MCNC: 4 MMOL/L — SIGNIFICANT CHANGE UP (ref 3.5–5.3)
POTASSIUM SERPL-SCNC: 3.9 MMOL/L — SIGNIFICANT CHANGE UP (ref 3.5–5.3)
POTASSIUM SERPL-SCNC: 4 MMOL/L — SIGNIFICANT CHANGE UP (ref 3.5–5.3)
RBC # BLD: 3.03 M/UL — LOW (ref 3.8–5.2)
RBC # FLD: 19.3 % — HIGH (ref 10.3–14.5)
SODIUM SERPL-SCNC: 143 MMOL/L — SIGNIFICANT CHANGE UP (ref 135–145)
SODIUM SERPL-SCNC: 143 MMOL/L — SIGNIFICANT CHANGE UP (ref 135–145)
SPECIMEN SOURCE: SIGNIFICANT CHANGE UP
WBC # BLD: 9.34 K/UL — SIGNIFICANT CHANGE UP (ref 3.8–10.5)
WBC # FLD AUTO: 9.34 K/UL — SIGNIFICANT CHANGE UP (ref 3.8–10.5)

## 2021-01-10 PROCEDURE — 99233 SBSQ HOSP IP/OBS HIGH 50: CPT | Mod: GC

## 2021-01-10 RX ORDER — SODIUM BICARBONATE 1 MEQ/ML
650 SYRINGE (ML) INTRAVENOUS ONCE
Refills: 0 | Status: DISCONTINUED | OUTPATIENT
Start: 2021-01-10 | End: 2021-01-10

## 2021-01-10 RX ORDER — MAGNESIUM SULFATE 500 MG/ML
2 VIAL (ML) INJECTION
Refills: 0 | Status: COMPLETED | OUTPATIENT
Start: 2021-01-10 | End: 2021-01-10

## 2021-01-10 RX ORDER — SODIUM BICARBONATE 1 MEQ/ML
325 SYRINGE (ML) INTRAVENOUS ONCE
Refills: 0 | Status: DISCONTINUED | OUTPATIENT
Start: 2021-01-10 | End: 2021-01-10

## 2021-01-10 RX ORDER — METOPROLOL TARTRATE 50 MG
5 TABLET ORAL EVERY 6 HOURS
Refills: 0 | Status: DISCONTINUED | OUTPATIENT
Start: 2021-01-10 | End: 2021-01-13

## 2021-01-10 RX ADMIN — Medication 50 GRAM(S): at 10:34

## 2021-01-10 RX ADMIN — Medication 1 APPLICATION(S): at 18:00

## 2021-01-10 RX ADMIN — Medication 1 PACKET(S): at 05:57

## 2021-01-10 RX ADMIN — Medication 50 GRAM(S): at 08:24

## 2021-01-10 RX ADMIN — Medication 5 MILLIGRAM(S): at 23:33

## 2021-01-10 RX ADMIN — Medication 50 MILLIGRAM(S): at 05:57

## 2021-01-10 RX ADMIN — Medication 5 MILLIGRAM(S): at 18:54

## 2021-01-10 RX ADMIN — AMLODIPINE BESYLATE 5 MILLIGRAM(S): 2.5 TABLET ORAL at 05:57

## 2021-01-10 RX ADMIN — PANTOPRAZOLE SODIUM 40 MILLIGRAM(S): 20 TABLET, DELAYED RELEASE ORAL at 12:24

## 2021-01-10 RX ADMIN — Medication 1 APPLICATION(S): at 05:57

## 2021-01-10 RX ADMIN — Medication 1 TABLET(S): at 05:57

## 2021-01-10 RX ADMIN — CHLORHEXIDINE GLUCONATE 1 APPLICATION(S): 213 SOLUTION TOPICAL at 12:25

## 2021-01-10 NOTE — PROGRESS NOTE ADULT - ASSESSMENT
This is a 60 year old Female with a PMH of HTN, DVT on Xarelto, peritonitis s/p Oral's procedure and ileostomy (reversed 2011), AARON who presented to [facility] on 11/18  w/ fevers found to have COVID-19, intubated 11/23, course complicated by superimposed PNA and bacteremia with Stenotrophomonas (12/11, completed Levaquin x7 days), MSSA/P. mirablis bacteremia (on Cefazolin, potentially due to urine vs line-associated and MSSA in sputum due to PNA), also with lung abscesses on CT chest on 12/5 , ARTEMIO/ATN requiring CRRT and HD. Now unable to wean from ventilator and transferred from [facility] on 12/10 to offload COVID unit. Unable to wean from ventilator, s/p tracheostmy with ctsx 12/18 planned for PEG 12/24 but unable to perform due to large ventral hernia     # AMS  - Alert & oriented x3 @baseline.    - s/p wean off Methadone and Valium 5mg TID  - Spot EEG - abnormal study- Moderate nonspecific diffuse or multifocal cerebral dysfunction   - Answering yes/no questions. No purposeful movements appreciated.  - PT/OT seeing pt more alert/following commands     # Acute hypoxemic respiratory failure 2/2 covid-19 PNA / MSSA Bacteremia/Pneumonia,  - s/p intubated 11/23. Course complicated by MSSA pneumonia, stenotrophomonas, p. mirabalis, and lung abscess on CT chest from 12/5.   - Pt has been unable to wean from ventilator, s/p Trached by CTSx on 12/18..   - s/p Imipenem (12/16- 12/28) 500mg q12h x 2 weeks for cavitation and MSSA,  - s/p Ceftazidime total of 7 days (12/29 - 1/5) (due to resistance to Levaquin)  - s/p Trach exchange to 8Fr Bivona w Cannula at 13.5 cm by CTSx on 12/31 due to previous large air leaks  - Plan for CTSx to remove trach sutures on 1/1/21    # Hypernatremia /#Hypokalemia resolved    # Hypomagnesemia   - Mg 1.4  - Replenished  with Mgsulfate 2gmIVPB x 2  - Will repeat in the afternoon    # Hx of DVT (on Xarelto at home)  - CTA negative for PE  - Lovenox on Hold today Sunday for planned PEG on Monday     # ARTEMIO 2/2 ATN   - s/p CRRT, and Bumex and intermittent HD.   - Last HD 12/17, Bumex gtt d/c'ed. Now with good UOP   - c/w Rivera for now, will consider TOV in the near future  - Strict I’s and O’s    # Diet  - c/w NGT feeding for now, tolerating well  - c/w bowel regimen  - c/w NPH q6hr, and ISS - Monitor FS routinely  - FS accucheck <100 past 24 h -Stop NPH and monitor - still controlled off NPH for now   - Reconsult IR for PEG - Plan Percutaneous PEG placement on Monday     #HTN   - c/w Amlodipine /Metoprolol   - Monitor BP with parameters     #Pressure Ulcer  - Continue Wound recs for now   - Plastics consulted - rec Santyl to cheek/chin area bid and will follow     #Dispo  - RCU  - PT/OT - PT/OT reconsulted and PT on program   - d/c most likely to vent rehab facility

## 2021-01-10 NOTE — PROGRESS NOTE ADULT - SUBJECTIVE AND OBJECTIVE BOX
CHIEF COMPLAINT: Patient is a 60y old  Female who presents with a chief complaint of Transfer from [facility] (09 Jan 2021 08:58)    Interval Events: None reported. Clinically unchanged. One isolated fever on 1/9 with increased secretions, Sputum culture sent. Mg 1.4 noted, replenished. Lovenox on hold for Percutaneous PEG placement.     REVIEW OF SYSTEMS:    [x] Unable to assess ROS because AMS    OBJECTIVE:  ICU Vital Signs Last 24 Hrs  T(C): 37 (10 Camilo 2021 05:53), Max: 38 (09 Jan 2021 17:43)  T(F): 98.6 (10 Camilo 2021 05:53), Max: 100.4 (09 Jan 2021 17:43)  HR: 86 (10 Camilo 2021 07:50) (79 - 99)  BP: 166/77 (10 Camilo 2021 05:53) (137/60 - 166/77)  BP(mean): --  ABP: --  ABP(mean): --  RR: 22 (10 Camilo 2021 05:53) (20 - 28)  SpO2: 98% (10 Camilo 2021 07:50) (96% - 99%)    Mode: AC/ CMV (Assist Control/ Continuous Mandatory Ventilation), RR (machine): 12, TV (machine): 450, FiO2: 40, PEEP: 5, MAP: 16, PIP: 39    01-09 @ 07:01  -  01-10 @ 07:00  --------------------------------------------------------  IN: 2080 mL / OUT: 900 mL / NET: 1180 mL      CAPILLARY BLOOD GLUCOSE      POCT Blood Glucose.: 114 mg/dL (09 Jan 2021 23:49)    PHYSICAL EXAM  General: Laying in bed, nad, Saturating well on Current vent settings  Neck: +trach to vent, area c/d/i  Respiratory: +coarse breath sounds b/l  Cardiovascular: +s1, s2   Abdomen: +BS, obese, soft, NT/ND  Extremities: +trace edema noted b/l  Skin: As per AAI sheet   Neurological: open eyes, able to track  Psychiatry: Appropriate    HOSPITAL MEDICATIONS:  MEDICATIONS  (STANDING):  amLODIPine   Tablet 5 milliGRAM(s) Oral daily  chlorhexidine 4% Liquid 1 Application(s) Topical daily  collagenase Ointment 1 Application(s) Topical two times a day  insulin lispro (ADMELOG) corrective regimen sliding scale   SubCutaneous every 6 hours  lactobacillus acidophilus 1 Tablet(s) Oral two times a day  magnesium sulfate  IVPB 2 Gram(s) IV Intermittent every 1 hour  metoprolol tartrate 50 milliGRAM(s) Oral two times a day  pantoprazole  Injectable 40 milliGRAM(s) IV Push daily  psyllium Powder 1 Packet(s) Oral two times a day    MEDICATIONS  (PRN):  acetaminophen    Suspension .. 650 milliGRAM(s) Oral every 6 hours PRN Temp greater or equal to 38C (100.4F)      LABS:                        8.6    9.34  )-----------( 506      ( 10 Camilo 2021 06:50 )             30.9     01-10    143  |  103  |  10  ----------------------------<  112<H>  4.0   |  31  |  0.28<L>    Ca    8.6      10 Camilo 2021 06:50  Phos  3.4     01-10  Mg     1.4     01-10      PT/INR - ( 08 Jan 2021 09:44 )   PT: 11.7 sec;   INR: 1.03 ratio         PTT - ( 08 Jan 2021 09:44 )  PTT:21.5 sec          MICROBIOLOGY:     RADIOLOGY:  [ ] Reviewed and interpreted by me    PULMONARY FUNCTION TESTS:    EKG:

## 2021-01-11 LAB
-  AMIKACIN: SIGNIFICANT CHANGE UP
-  AZTREONAM: SIGNIFICANT CHANGE UP
-  CEFEPIME: SIGNIFICANT CHANGE UP
-  CEFTAZIDIME: SIGNIFICANT CHANGE UP
-  CEFTAZIDIME: SIGNIFICANT CHANGE UP
-  CIPROFLOXACIN: SIGNIFICANT CHANGE UP
-  GENTAMICIN: SIGNIFICANT CHANGE UP
-  IMIPENEM: SIGNIFICANT CHANGE UP
-  LEVOFLOXACIN: SIGNIFICANT CHANGE UP
-  LEVOFLOXACIN: SIGNIFICANT CHANGE UP
-  MEROPENEM: SIGNIFICANT CHANGE UP
-  PIPERACILLIN/TAZOBACTAM: SIGNIFICANT CHANGE UP
-  TOBRAMYCIN: SIGNIFICANT CHANGE UP
-  TRIMETHOPRIM/SULFAMETHOXAZOLE: SIGNIFICANT CHANGE UP
ANION GAP SERPL CALC-SCNC: 11 MMOL/L — SIGNIFICANT CHANGE UP (ref 7–14)
BUN SERPL-MCNC: 8 MG/DL — SIGNIFICANT CHANGE UP (ref 7–23)
CALCIUM SERPL-MCNC: 8.8 MG/DL — SIGNIFICANT CHANGE UP (ref 8.4–10.5)
CHLORIDE SERPL-SCNC: 104 MMOL/L — SIGNIFICANT CHANGE UP (ref 98–107)
CO2 SERPL-SCNC: 30 MMOL/L — SIGNIFICANT CHANGE UP (ref 22–31)
CREAT SERPL-MCNC: 0.26 MG/DL — LOW (ref 0.5–1.3)
CULTURE RESULTS: SIGNIFICANT CHANGE UP
GLUCOSE SERPL-MCNC: 92 MG/DL — SIGNIFICANT CHANGE UP (ref 70–99)
HCT VFR BLD CALC: 31.7 % — LOW (ref 34.5–45)
HGB BLD-MCNC: 9 G/DL — LOW (ref 11.5–15.5)
MAGNESIUM SERPL-MCNC: 1.7 MG/DL — SIGNIFICANT CHANGE UP (ref 1.6–2.6)
MCHC RBC-ENTMCNC: 28.4 GM/DL — LOW (ref 32–36)
MCHC RBC-ENTMCNC: 28.6 PG — SIGNIFICANT CHANGE UP (ref 27–34)
MCV RBC AUTO: 100.6 FL — HIGH (ref 80–100)
METHOD TYPE: SIGNIFICANT CHANGE UP
METHOD TYPE: SIGNIFICANT CHANGE UP
NRBC # BLD: 0 /100 WBCS — SIGNIFICANT CHANGE UP
NRBC # FLD: 0.03 K/UL — HIGH
ORGANISM # SPEC MICROSCOPIC CNT: SIGNIFICANT CHANGE UP
PHOSPHATE SERPL-MCNC: 3.9 MG/DL — SIGNIFICANT CHANGE UP (ref 2.5–4.5)
PLATELET # BLD AUTO: 491 K/UL — HIGH (ref 150–400)
POTASSIUM SERPL-MCNC: 4.5 MMOL/L — SIGNIFICANT CHANGE UP (ref 3.5–5.3)
POTASSIUM SERPL-SCNC: 4.5 MMOL/L — SIGNIFICANT CHANGE UP (ref 3.5–5.3)
RBC # BLD: 3.15 M/UL — LOW (ref 3.8–5.2)
RBC # FLD: 19.3 % — HIGH (ref 10.3–14.5)
SODIUM SERPL-SCNC: 145 MMOL/L — SIGNIFICANT CHANGE UP (ref 135–145)
SPECIMEN SOURCE: SIGNIFICANT CHANGE UP
WBC # BLD: 10.55 K/UL — HIGH (ref 3.8–10.5)
WBC # FLD AUTO: 10.55 K/UL — HIGH (ref 3.8–10.5)

## 2021-01-11 PROCEDURE — 49440 PLACE GASTROSTOMY TUBE PERC: CPT

## 2021-01-11 PROCEDURE — 99233 SBSQ HOSP IP/OBS HIGH 50: CPT

## 2021-01-11 RX ORDER — ENOXAPARIN SODIUM 100 MG/ML
100 INJECTION SUBCUTANEOUS EVERY 12 HOURS
Refills: 0 | Status: DISCONTINUED | OUTPATIENT
Start: 2021-01-12 | End: 2021-02-19

## 2021-01-11 RX ADMIN — PANTOPRAZOLE SODIUM 40 MILLIGRAM(S): 20 TABLET, DELAYED RELEASE ORAL at 12:01

## 2021-01-11 RX ADMIN — Medication 1 APPLICATION(S): at 18:00

## 2021-01-11 RX ADMIN — Medication 5 MILLIGRAM(S): at 12:00

## 2021-01-11 RX ADMIN — Medication 1 APPLICATION(S): at 05:23

## 2021-01-11 RX ADMIN — Medication 5 MILLIGRAM(S): at 18:00

## 2021-01-11 RX ADMIN — Medication 5 MILLIGRAM(S): at 05:22

## 2021-01-11 RX ADMIN — CHLORHEXIDINE GLUCONATE 1 APPLICATION(S): 213 SOLUTION TOPICAL at 12:00

## 2021-01-11 RX ADMIN — Medication 5 MILLIGRAM(S): at 23:58

## 2021-01-11 NOTE — PROGRESS NOTE ADULT - ASSESSMENT
This is a 60 year old Female with a PMH of HTN, DVT on Xarelto, peritonitis s/p Oral's procedure and ileostomy (reversed 2011), AARON who presented to [facility] on 11/18  w/ fevers found to have COVID-19, intubated 11/23, course complicated by superimposed PNA and bacteremia with Stenotrophomonas (12/11, completed Levaquin x7 days), MSSA/P. mirablis bacteremia (on Cefazolin, potentially due to urine vs line-associated and MSSA in sputum due to PNA), also with lung abscesses on CT chest on 12/5 , ARTEMIO/ATN requiring CRRT and HD. Now unable to wean from ventilator and transferred from [facility] on 12/10 to offload COVID unit. Unable to wean from ventilator, s/p tracheostmy with ctsx 12/18 planned for PEG 12/24 but unable to perform due to large ventral hernia     # AMS  - Alert & oriented x3 @baseline.    - s/p wean off Methadone and Valium 5mg TID  - Spot EEG - abnormal study- Moderate nonspecific diffuse or multifocal cerebral dysfunction   - Answering yes/no questions. No purposeful movements appreciated.  - PT/OT seeing pt more alert/following commands     # Acute hypoxemic respiratory failure 2/2 covid-19 PNA / MSSA Bacteremia/Pneumonia,  - s/p intubated 11/23. Course complicated by MSSA pneumonia, stenotrophomonas, p. mirabalis, and lung abscess on CT chest from 12/5.   - Pt has been unable to wean from ventilator, s/p Trached by CTSx on 12/18..   - s/p Imipenem (12/16- 12/28) 500mg q12h x 2 weeks for cavitation and MSSA,  - s/p Ceftazidime total of 7 days (12/29 - 1/5) (due to resistance to Levaquin)  - s/p Trach exchange to 8Fr Bivona w Cannula at 13.5 cm by CTSx on 12/31 due to previous large air leaks  - Plan for CTSx to remove trach sutures on 1/1/21    # Hypernatremia /#Hypokalemia resolved    # Hypomagnesemia   - Mg 1.4  - Replenished  with Mgsulfate 2gmIVPB x 2  - Will repeat in the afternoon    # Hx of DVT (on Xarelto at home)  - CTA negative for PE  - Lovenox on Hold today Sunday for planned PEG on Monday     # ARTEMIO 2/2 ATN   - s/p CRRT, and Bumex and intermittent HD.   - Last HD 12/17, Bumex gtt d/c'ed. Now with good UOP   - c/w Rivera for now, will consider TOV in the near future  - Strict I’s and O’s    # Diet  - c/w NGT feeding for now, tolerating well  - c/w bowel regimen  - c/w NPH q6hr, and ISS - Monitor FS routinely  - FS accucheck <100 past 24 h -Stop NPH and monitor - still controlled off NPH for now   - Reconsult IR for PEG - Plan Percutaneous PEG placement on Monday     #HTN   - c/w Amlodipine /Metoprolol   - Monitor BP with parameters     #Pressure Ulcer  - Continue Wound recs for now   - Plastics consulted - rec Santyl to cheek/chin area bid and will follow     #Dispo  - RCU  - PT/OT - PT/OT reconsulted and PT on program   - d/c most likely to vent rehab facility    This is a 60 year old Female with a PMH of HTN, DVT on Xarelto, peritonitis s/p Oral's procedure and ileostomy (reversed 2011), AARON who presented to [facility] on 11/18  w/ fevers found to have COVID-19, intubated 11/23, course complicated by superimposed PNA and bacteremia with Stenotrophomonas (12/11, completed Levaquin x7 days), MSSA/P. mirablis bacteremia (on Cefazolin, potentially due to urine vs line-associated and MSSA in sputum due to PNA), also with lung abscesses on CT chest on 12/5 , ARTEMIO/ATN requiring CRRT and HD. Now unable to wean from ventilator and transferred from [facility] on 12/10 to offload COVID unit. Unable to wean from ventilator, s/p tracheostmy with ctsx 12/18 planned for PEG 12/24 but unable to perform due to large ventral hernia     # AMS  - Alert & oriented x3 @baseline.    - s/p wean off Methadone and Valium 5mg TID  - Spot EEG - abnormal study- Moderate nonspecific diffuse or multifocal cerebral dysfunction   - Answering yes/no questions. No purposeful movements appreciated.  - PT/OT seeing pt more alert/following commands     # Acute hypoxemic respiratory failure 2/2 covid-19 PNA / MSSA Bacteremia/Pneumonia,  - s/p intubated 11/23. Course complicated by MSSA pneumonia, stenotrophomonas, p. mirabalis, and lung abscess on CT chest from 12/5.   - Pt has been unable to wean from ventilator, s/p Trached by CTSx on 12/18..   - s/p Imipenem (12/16- 12/28) 500mg q12h x 2 weeks for cavitation and MSSA,  - s/p Ceftazidime total of 7 days (12/29 - 1/5) (due to resistance to Levaquin)  - s/p Trach exchange to 8Fr Bivona w Cannula at 13.5 cm by CTSx on 12/31 due to previous large air leaks    # Hypernatremia /#Hypokalemia # Hypomagnesemia resolved    # Hx of DVT (on Xarelto at home)  - CTA negative for PE  - Will resume Lovenox in the morning. s/p IR guided PEG tube placement done today 1/11    # ARTEMIO 2/2 ATN   - s/p CRRT, and Bumex and intermittent HD.   - Last HD 12/17, Bumex gtt d/c'ed. Now with good UOP   - c/w Rivera for now, will consider TOV in the near future  - Strict I’s and O’s    # Diet  - 16F g PEG tube placed on 1/11 by [employer] singh'shagufta  - Will start to use it on 1/12 after 4:30pm  - c/w bowel regimen  - c/w NPH q6hr, and ISS - Monitor FS routinely  - FS accucheck <100 past 24 h -Stop NPH and monitor - still controlled off NPH for now      #HTN   - c/w Amlodipine /Metoprolol   - Monitor BP with parameters     #Pressure Ulcer  - Continue Wound recs for now   - Plastics consulted - rec Santyl to cheek/chin area bid and will follow     #Dispo  - RCU  - PT/OT - PT/OT reconsulted and PT on program   - d/c most likely to vent rehab facility

## 2021-01-11 NOTE — PROGRESS NOTE ADULT - ATTENDING COMMENTS
Agree with plan as outlined above. Patient seen and examined at bedside. Patient history, laboratory data, and imaging personally reviewed.    Pt is a 60F with PMHX as above presenting to [facility] for hypoxemic respiratory failure with ARDS 2/2 COVID19 PNA further c/b superimposed MSSA cavitary PNA and ARF requiring HD. Pt with failure to wean from mechanical ventilation, s/p tracheostomy placement 12/18 and transfer to RCU 12/28.     Pt now with improving encephalopathy, able to communicate and answer basic questions. Remains off all IV sedation since 12/24. PO sedation held following trach exchange. CT Head (-) 12/12.  EEG (-). No indication for further neurologic w/u at this time given clinical improvement.     Pt with trach placement (CTSx, 12/18 Proximal 7XLT and then changed to 8Fr Bivona 12/31 for persistent air leak), now with resolution of air leak and significant improvement in ventilator synchrony. Daily SBTs as tolerated. Airway clearance therapy and trach care as per RCU team. Pt with bleeding from trach site last week, now resolved. Lovenox restarted without complication. Held today pending IR procedure.     Pt with hx MSSA bacteremia with cavitary PNA s/p completion of Abx with Primaxin on 12/28 followed by course of Ceftazidime through 1/5 for Stenotrophomonas PNA. Now monitoring off Abx.     Pt with oropharyngeal dysphagia, tolerating feeds via NGT. GI ppx and bowel regimen in place. Difficulty to place PEG given large ventral hernia with hx abdominal sx. Poor candidate for surgical open PEG placement. Plan for IR guided attempt for PEG placement today. Pt also initially with ARF on CRRT, following by intermittent HD (last session 12/17) with renal recovery. NPH+HISS for glucose control. Wound consult and plastic sx recs appreciated.    Dispo pending medical optimization. Discussed with pt's  (Scotty) and son (Eric, ER physician) at length. Will attempt Facetime today with family. PT reconsult given clinical improvement and severe deconditioning.

## 2021-01-11 NOTE — BRIEF OPERATIVE NOTE - OPERATION/FINDINGS
s/p 16F g tube placement. Keep to drainage bag for 24 hours and then can use gtube following.
Tracheostomy tube replacement; 8Fr Bivona w/cannula at 13.5cm. No airleak at the end of the case, receiving 90%+ of TVs.
Open Tracheostomy, flexible bronchoscopy  7 [facility] XLT proximal

## 2021-01-11 NOTE — PROGRESS NOTE ADULT - SUBJECTIVE AND OBJECTIVE BOX
CHIEF COMPLAINT: Patient is a 60y old  Female who presents with a chief complaint of Transfer from [facility] (10 Camilo 2021 08:14)    Interval Events:    REVIEW OF SYSTEMS:  Constitutional:   Eyes:  ENT:  CV:  Resp:  GI:  :  MSK:  Integumentary:  Neurological:  Psychiatric:  Endocrine:  Hematologic/Lymphatic:  Allergic/Immunologic:  [ ] All other systems negative  [ ] Unable to assess ROS because ________    OBJECTIVE:  ICU Vital Signs Last 24 Hrs  T(C): 36.4 (11 Jan 2021 05:24), Max: 36.8 (10 Camilo 2021 10:27)  T(F): 97.5 (11 Jan 2021 05:24), Max: 98.3 (10 Camilo 2021 10:27)  HR: 88 (11 Jan 2021 07:58) (82 - 95)  BP: 147/57 (11 Jan 2021 05:24) (128/69 - 147/57)  BP(mean): --  ABP: --  ABP(mean): --  RR: 13 (11 Jan 2021 05:24) (13 - 27)  SpO2: 100% (11 Jan 2021 07:58) (96% - 100%)    Mode: AC/ CMV (Assist Control/ Continuous Mandatory Ventilation), RR (machine): 12, TV (machine): 450, FiO2: 40, PEEP: 5, MAP: 15, PIP: 33    01-10 @ 07:01  -  01-11 @ 07:00  --------------------------------------------------------  IN: 360 mL / OUT: 1600 mL / NET: -1240 mL      CAPILLARY BLOOD GLUCOSE      POCT Blood Glucose.: 114 mg/dL (09 Jan 2021 23:49)      PHYSICAL EXAM:  General:   HEENT:   Lymph Nodes:  Neck:   Respiratory:   Cardiovascular:   Abdomen:   Extremities:   Skin:   Neurological:  Psychiatry:    HOSPITAL MEDICATIONS:  MEDICATIONS  (STANDING):  amLODIPine   Tablet 5 milliGRAM(s) Oral daily  chlorhexidine 4% Liquid 1 Application(s) Topical daily  collagenase Ointment 1 Application(s) Topical two times a day  insulin lispro (ADMELOG) corrective regimen sliding scale   SubCutaneous every 6 hours  lactobacillus acidophilus 1 Tablet(s) Oral two times a day  metoprolol tartrate 50 milliGRAM(s) Oral two times a day  metoprolol tartrate Injectable 5 milliGRAM(s) IV Push every 6 hours  pantoprazole  Injectable 40 milliGRAM(s) IV Push daily  psyllium Powder 1 Packet(s) Oral two times a day    MEDICATIONS  (PRN):  acetaminophen    Suspension .. 650 milliGRAM(s) Oral every 6 hours PRN Temp greater or equal to 38C (100.4F)      LABS:                        9.0    10.55 )-----------( 491      ( 11 Jan 2021 07:11 )             31.7     01-10    143  |  102  |  10  ----------------------------<  115<H>  3.9   |  32<H>  |  0.29<L>    Ca    8.7      10 Camilo 2021 17:19  Phos  3.5     01-10  Mg     2.1     01-10                MICROBIOLOGY:     RADIOLOGY:  [ ] Reviewed and interpreted by me    PULMONARY FUNCTION TESTS:    EKG: CHIEF COMPLAINT: Patient is a 60y old  Female who presents with a chief complaint of Transfer from [facility] (10 Camilo 2021 08:14)    Interval Events: None reported overnight. Pt is s/p 16 F g PEG tube placement by IR today, Will start using 24 hrs later. Clinically unchanged. Unable to fully assess ROS due to poor phonation.     REVIEW OF SYSTEMS:    [x] Unable to assess ROS because poor phonation    OBJECTIVE:  ICU Vital Signs Last 24 Hrs  T(C): 36.4 (11 Jan 2021 05:24), Max: 36.8 (10 Camilo 2021 10:27)  T(F): 97.5 (11 Jan 2021 05:24), Max: 98.3 (10 Camilo 2021 10:27)  HR: 88 (11 Jan 2021 07:58) (82 - 95)  BP: 147/57 (11 Jan 2021 05:24) (128/69 - 147/57)  BP(mean): --  ABP: --  ABP(mean): --  RR: 13 (11 Jan 2021 05:24) (13 - 27)  SpO2: 100% (11 Jan 2021 07:58) (96% - 100%)    Mode: AC/ CMV (Assist Control/ Continuous Mandatory Ventilation), RR (machine): 12, TV (machine): 450, FiO2: 40, PEEP: 5, MAP: 15, PIP: 33    01-10 @ 07:01  -  01-11 @ 07:00  --------------------------------------------------------  IN: 360 mL / OUT: 1600 mL / NET: -1240 mL      CAPILLARY BLOOD GLUCOSE      POCT Blood Glucose.: 114 mg/dL (09 Jan 2021 23:49)    PHYSICAL EXAM  General: obese female laying in bed, NAD, Saturating well on Current vent settings  Neck: +trach to vent, area c/d/i  Respiratory: +coarse breath sounds b/l  Cardiovascular: +s1, s2   Abdomen: +BS, obese, soft, NT/ND  Extremities: +trace edema noted b/l  Skin: As per AAI sheet   Neurological: open eyes, able to track  Psychiatry: Appropriate      HOSPITAL MEDICATIONS:  MEDICATIONS  (STANDING):  amLODIPine   Tablet 5 milliGRAM(s) Oral daily  chlorhexidine 4% Liquid 1 Application(s) Topical daily  collagenase Ointment 1 Application(s) Topical two times a day  insulin lispro (ADMELOG) corrective regimen sliding scale   SubCutaneous every 6 hours  lactobacillus acidophilus 1 Tablet(s) Oral two times a day  metoprolol tartrate 50 milliGRAM(s) Oral two times a day  metoprolol tartrate Injectable 5 milliGRAM(s) IV Push every 6 hours  pantoprazole  Injectable 40 milliGRAM(s) IV Push daily  psyllium Powder 1 Packet(s) Oral two times a day    MEDICATIONS  (PRN):  acetaminophen    Suspension .. 650 milliGRAM(s) Oral every 6 hours PRN Temp greater or equal to 38C (100.4F)      LABS:                        9.0    10.55 )-----------( 491      ( 11 Jan 2021 07:11 )             31.7     01-10    143  |  102  |  10  ----------------------------<  115<H>  3.9   |  32<H>  |  0.29<L>    Ca    8.7      10 Camilo 2021 17:19  Phos  3.5     01-10  Mg     2.1     01-10        MICROBIOLOGY:     RADIOLOGY:  [ ] Reviewed and interpreted by me    PULMONARY FUNCTION TESTS:    EKG:

## 2021-01-12 LAB
ANION GAP SERPL CALC-SCNC: 10 MMOL/L — SIGNIFICANT CHANGE UP (ref 7–14)
ANION GAP SERPL CALC-SCNC: 9 MMOL/L — SIGNIFICANT CHANGE UP (ref 7–14)
BUN SERPL-MCNC: 6 MG/DL — LOW (ref 7–23)
BUN SERPL-MCNC: 6 MG/DL — LOW (ref 7–23)
CALCIUM SERPL-MCNC: 8.2 MG/DL — LOW (ref 8.4–10.5)
CALCIUM SERPL-MCNC: 8.5 MG/DL — SIGNIFICANT CHANGE UP (ref 8.4–10.5)
CHLORIDE SERPL-SCNC: 101 MMOL/L — SIGNIFICANT CHANGE UP (ref 98–107)
CHLORIDE SERPL-SCNC: 102 MMOL/L — SIGNIFICANT CHANGE UP (ref 98–107)
CO2 SERPL-SCNC: 29 MMOL/L — SIGNIFICANT CHANGE UP (ref 22–31)
CO2 SERPL-SCNC: 31 MMOL/L — SIGNIFICANT CHANGE UP (ref 22–31)
CREAT SERPL-MCNC: 0.3 MG/DL — LOW (ref 0.5–1.3)
CREAT SERPL-MCNC: 0.32 MG/DL — LOW (ref 0.5–1.3)
GLUCOSE BLDC GLUCOMTR-MCNC: 103 MG/DL — HIGH (ref 70–99)
GLUCOSE BLDC GLUCOMTR-MCNC: 91 MG/DL — SIGNIFICANT CHANGE UP (ref 70–99)
GLUCOSE BLDC GLUCOMTR-MCNC: 95 MG/DL — SIGNIFICANT CHANGE UP (ref 70–99)
GLUCOSE SERPL-MCNC: 139 MG/DL — HIGH (ref 70–99)
GLUCOSE SERPL-MCNC: 92 MG/DL — SIGNIFICANT CHANGE UP (ref 70–99)
HCT VFR BLD CALC: 30.1 % — LOW (ref 34.5–45)
HGB BLD-MCNC: 8.7 G/DL — LOW (ref 11.5–15.5)
MAGNESIUM SERPL-MCNC: 1.3 MG/DL — LOW (ref 1.6–2.6)
MAGNESIUM SERPL-MCNC: 1.9 MG/DL — SIGNIFICANT CHANGE UP (ref 1.6–2.6)
MCHC RBC-ENTMCNC: 28.5 PG — SIGNIFICANT CHANGE UP (ref 27–34)
MCHC RBC-ENTMCNC: 28.9 GM/DL — LOW (ref 32–36)
MCV RBC AUTO: 98.7 FL — SIGNIFICANT CHANGE UP (ref 80–100)
NRBC # BLD: 0 /100 WBCS — SIGNIFICANT CHANGE UP
NRBC # FLD: 0 K/UL — SIGNIFICANT CHANGE UP
PHOSPHATE SERPL-MCNC: 3.1 MG/DL — SIGNIFICANT CHANGE UP (ref 2.5–4.5)
PHOSPHATE SERPL-MCNC: 3.4 MG/DL — SIGNIFICANT CHANGE UP (ref 2.5–4.5)
PLATELET # BLD AUTO: 452 K/UL — HIGH (ref 150–400)
POTASSIUM SERPL-MCNC: 3.2 MMOL/L — LOW (ref 3.5–5.3)
POTASSIUM SERPL-MCNC: 3.8 MMOL/L — SIGNIFICANT CHANGE UP (ref 3.5–5.3)
POTASSIUM SERPL-SCNC: 3.2 MMOL/L — LOW (ref 3.5–5.3)
POTASSIUM SERPL-SCNC: 3.8 MMOL/L — SIGNIFICANT CHANGE UP (ref 3.5–5.3)
RBC # BLD: 3.05 M/UL — LOW (ref 3.8–5.2)
RBC # FLD: 18.7 % — HIGH (ref 10.3–14.5)
SODIUM SERPL-SCNC: 139 MMOL/L — SIGNIFICANT CHANGE UP (ref 135–145)
SODIUM SERPL-SCNC: 143 MMOL/L — SIGNIFICANT CHANGE UP (ref 135–145)
WBC # BLD: 13.22 K/UL — HIGH (ref 3.8–10.5)
WBC # FLD AUTO: 13.22 K/UL — HIGH (ref 3.8–10.5)

## 2021-01-12 PROCEDURE — 99233 SBSQ HOSP IP/OBS HIGH 50: CPT

## 2021-01-12 PROCEDURE — 99232 SBSQ HOSP IP/OBS MODERATE 35: CPT

## 2021-01-12 RX ORDER — METRONIDAZOLE 500 MG
500 TABLET ORAL EVERY 8 HOURS
Refills: 0 | Status: DISCONTINUED | OUTPATIENT
Start: 2021-01-12 | End: 2021-01-17

## 2021-01-12 RX ORDER — METRONIDAZOLE 500 MG
500 TABLET ORAL ONCE
Refills: 0 | Status: COMPLETED | OUTPATIENT
Start: 2021-01-12 | End: 2021-01-12

## 2021-01-12 RX ORDER — CEFTAZIDIME 6 G/30ML
2 INJECTION, POWDER, FOR SOLUTION INTRAVENOUS EVERY 8 HOURS
Refills: 0 | Status: DISCONTINUED | OUTPATIENT
Start: 2021-01-12 | End: 2021-01-17

## 2021-01-12 RX ORDER — MAGNESIUM SULFATE 500 MG/ML
2 VIAL (ML) INJECTION
Refills: 0 | Status: COMPLETED | OUTPATIENT
Start: 2021-01-12 | End: 2021-01-12

## 2021-01-12 RX ORDER — SODIUM CHLORIDE 9 MG/ML
1000 INJECTION, SOLUTION INTRAVENOUS
Refills: 0 | Status: DISCONTINUED | OUTPATIENT
Start: 2021-01-12 | End: 2021-01-13

## 2021-01-12 RX ORDER — METRONIDAZOLE 500 MG
TABLET ORAL
Refills: 0 | Status: DISCONTINUED | OUTPATIENT
Start: 2021-01-12 | End: 2021-01-17

## 2021-01-12 RX ORDER — ACETAMINOPHEN 500 MG
1000 TABLET ORAL ONCE
Refills: 0 | Status: COMPLETED | OUTPATIENT
Start: 2021-01-12 | End: 2021-01-12

## 2021-01-12 RX ORDER — VANCOMYCIN HCL 1 G
1000 VIAL (EA) INTRAVENOUS ONCE
Refills: 0 | Status: COMPLETED | OUTPATIENT
Start: 2021-01-12 | End: 2021-01-12

## 2021-01-12 RX ADMIN — Medication 100 MILLIGRAM(S): at 21:00

## 2021-01-12 RX ADMIN — Medication 5 MILLIGRAM(S): at 11:47

## 2021-01-12 RX ADMIN — CHLORHEXIDINE GLUCONATE 1 APPLICATION(S): 213 SOLUTION TOPICAL at 15:56

## 2021-01-12 RX ADMIN — Medication 5 MILLIGRAM(S): at 06:24

## 2021-01-12 RX ADMIN — CEFTAZIDIME 100 GRAM(S): 6 INJECTION, POWDER, FOR SOLUTION INTRAVENOUS at 15:56

## 2021-01-12 RX ADMIN — ENOXAPARIN SODIUM 100 MILLIGRAM(S): 100 INJECTION SUBCUTANEOUS at 16:39

## 2021-01-12 RX ADMIN — Medication 1 TABLET(S): at 16:40

## 2021-01-12 RX ADMIN — Medication 1 PACKET(S): at 16:40

## 2021-01-12 RX ADMIN — CEFTAZIDIME 100 GRAM(S): 6 INJECTION, POWDER, FOR SOLUTION INTRAVENOUS at 22:33

## 2021-01-12 RX ADMIN — Medication 50 GRAM(S): at 08:47

## 2021-01-12 RX ADMIN — Medication 50 MILLIGRAM(S): at 16:39

## 2021-01-12 RX ADMIN — SODIUM CHLORIDE 60 MILLILITER(S): 9 INJECTION, SOLUTION INTRAVENOUS at 07:22

## 2021-01-12 RX ADMIN — PANTOPRAZOLE SODIUM 40 MILLIGRAM(S): 20 TABLET, DELAYED RELEASE ORAL at 11:47

## 2021-01-12 RX ADMIN — Medication 250 MILLIGRAM(S): at 18:01

## 2021-01-12 RX ADMIN — Medication 1 APPLICATION(S): at 16:39

## 2021-01-12 RX ADMIN — Medication 100 MILLIGRAM(S): at 18:02

## 2021-01-12 RX ADMIN — Medication 1 APPLICATION(S): at 06:25

## 2021-01-12 RX ADMIN — ENOXAPARIN SODIUM 100 MILLIGRAM(S): 100 INJECTION SUBCUTANEOUS at 06:24

## 2021-01-12 RX ADMIN — Medication 50 GRAM(S): at 11:47

## 2021-01-12 RX ADMIN — Medication 400 MILLIGRAM(S): at 07:22

## 2021-01-12 NOTE — PROGRESS NOTE ADULT - ATTENDING COMMENTS
Agree with plan as outlined above. Patient seen and examined at bedside. Patient history, laboratory data, and imaging personally reviewed.    Pt is a 60F with PMHX as above presenting to [facility] for hypoxemic respiratory failure with ARDS 2/2 COVID19 PNA further c/b superimposed MSSA cavitary PNA and ARF requiring HD. Pt with failure to wean from mechanical ventilation, s/p tracheostomy placement 12/18 and transfer to RCU 12/28.     Pt now with improving encephalopathy, able to communicate and answer basic questions. Remains off all IV sedation since 12/24. PO sedation held following trach exchange. CT Head (-) 12/12.  EEG (-). No indication for further neurologic w/u at this time given clinical improvement.     Pt with trach placement (CTSx, 12/18 Proximal 7XLT and then changed to 8Fr Bivona 12/31 for persistent air leak), now with resolution of air leak and significant improvement in ventilator synchrony. Daily SBTs as tolerated. Airway clearance therapy and trach care as per RCU team. Pt with bleeding from trach site last week, now resolved. Lovenox restarted without complication.     Pt with hx MSSA bacteremia with cavitary PNA s/p completion of Abx with Primaxin on 12/28 followed by course of Ceftazidime through 1/5 for Stenotrophomonas PNA. Overnight, pt with recurrence of fevers and (+) sputum cx for Stenotrophomonas and Pseudomonas. Ceftazidime restarted.    Pt with oropharyngeal dysphagia, IR guided PEG successfully placed 1/11. Pt also initially with ARF on CRRT, following by intermittent HD (last session 12/17) with renal recovery. NPH+HISS for glucose control. Wound consult and plastic sx recs appreciated.    Dispo pending medical optimization. Discussed with pt's  (Scotty) and son (Eric, ER physician) at length. Will attempt Facetime with family. PT reconsult given clinical improvement and severe deconditioning pending.

## 2021-01-12 NOTE — PROGRESS NOTE ADULT - SUBJECTIVE AND OBJECTIVE BOX
ANESTHESIA POSTOP CHECK    60y Female POSTOP DAY 1    Vital Signs Last 24 Hrs  T(C): 38.1 (12 Jan 2021 08:43), Max: 38.1 (12 Jan 2021 08:43)  T(F): 100.6 (12 Jan 2021 08:43), Max: 100.6 (12 Jan 2021 08:43)  HR: 102 (12 Jan 2021 08:43) (92 - 114)  BP: 131/63 (12 Jan 2021 08:43) (126/66 - 159/68)  BP(mean): --  RR: 17 (12 Jan 2021 08:43) (17 - 25)  SpO2: 100% (12 Jan 2021 08:43) (100% - 100%)  I&O's Summary    11 Jan 2021 07:01  -  12 Jan 2021 07:00  --------------------------------------------------------  IN: 0 mL / OUT: 1050 mL / NET: -1050 mL        [X ] NO APPARENT ANESTHESIA COMPLICATIONS

## 2021-01-12 NOTE — PROGRESS NOTE ADULT - ASSESSMENT
This is a 60 year old Female with a PMH of HTN, DVT on Xarelto, peritonitis s/p Oral's procedure and ileostomy (reversed 2011), AARON who presented to [facility] on 11/18  w/ fevers found to have COVID-19, intubated 11/23, course complicated by superimposed PNA and bacteremia with Stenotrophomonas (12/11, completed Levaquin x7 days), MSSA/P. mirablis bacteremia (on Cefazolin, potentially due to urine vs line-associated and MSSA in sputum due to PNA), also with lung abscesses on CT chest on 12/5 , ARTEMIO/ATN requiring CRRT and HD. Now unable to wean from ventilator and transferred from [facility] on 12/10 to offload COVID unit. Unable to wean from ventilator, s/p tracheostmy with ctsx 12/18 planned for PEG 12/24 but unable to perform due to large ventral hernia     # AMS  - Alert & oriented x3 @baseline.    - s/p wean off Methadone and Valium 5mg TID  - Spot EEG - abnormal study- Moderate nonspecific diffuse or multifocal cerebral dysfunction   - Answering yes/no questions. No purposeful movements appreciated.  - PT/OT seeing pt more alert/following commands     # Acute hypoxemic respiratory failure 2/2 covid-19 PNA / MSSA Bacteremia/Pneumonia,  - s/p intubated 11/23. Course complicated by MSSA pneumonia, stenotrophomonas, p. mirabalis, and lung abscess on CT chest from 12/5.   - Pt has been unable to wean from ventilator, s/p Trached by CTSx on 12/18..   - s/p Imipenem (12/16- 12/28) 500mg q12h x 2 weeks for cavitation and MSSA,  - s/p Ceftazidime total of 7 days (12/29 - 1/5) (due to resistance to Levaquin)  - s/p Trach exchange to 8Fr Bivona w Cannula at 13.5 cm by CTSx on 12/31 due to previous large air leaks    # Hypernatremia /#Hypokalemia # Hypomagnesemia resolved    # Hx of DVT (on Xarelto at home)  - CTA negative for PE  - Will resume Lovenox in the morning. s/p IR guided PEG tube placement done today 1/11    # ARETMIO 2/2 ATN   - s/p CRRT, and Bumex and intermittent HD.   - Last HD 12/17, Bumex gtt d/c'ed. Now with good UOP   - c/w Rivera for now, will consider TOV in the near future  - Strict I’s and O’s    # Diet  - 16F g PEG tube placed on 1/11 by PEMA singh'shagufta  - Will start to use it on 1/12 after 4:30pm  - c/w bowel regimen  - c/w NPH q6hr, and ISS - Monitor FS routinely  - FS accucheck <100 past 24 h -Stop NPH and monitor - still controlled off NPH for now      #HTN   - c/w Amlodipine /Metoprolol   - Monitor BP with parameters     #Pressure Ulcer  - Continue Wound recs for now   - Plastics consulted - rec Santyl to cheek/chin area bid and will follow     #Dispo  - RCU  - PT/OT - PT/OT reconsulted and PT on program   - d/c most likely to vent rehab facility    This is a 60 year old Female with a PMH of HTN, DVT on Xarelto, peritonitis s/p Oral's procedure and ileostomy (reversed 2011), AARON who presented to [facility] on 11/18  w/ fevers found to have COVID-19, intubated 11/23, course complicated by superimposed PNA and bacteremia with Stenotrophomonas (12/11, completed Levaquin x7 days), MSSA/P. mirablis bacteremia (on Cefazolin, potentially due to urine vs line-associated and MSSA in sputum due to PNA), also with lung abscesses on CT chest on 12/5 , ARTEMIO/ATN requiring CRRT and HD. Now unable to wean from ventilator and transferred from [facility] on 12/10 to offload COVID unit. Unable to wean from ventilator, s/p tracheostmy with ctsx 12/18 planned for PEG 12/24 but unable to perform due to large ventral hernia     # AMS  - Alert & oriented x3 @baseline.    - s/p wean off Methadone and Valium 5mg TID  - Spot EEG - abnormal study- Moderate nonspecific diffuse or multifocal cerebral dysfunction   - No purposeful movements appreciated.  - PT/OT seeing pt when more alert/following commands     # Acute hypoxemic respiratory failure 2/2 covid-19 PNA / MSSA Bacteremia/Pneumonia,  - s/p intubated 11/23. Course complicated by MSSA pneumonia, stenotrophomonas, p. mirabalis, and lung abscess on CT chest from 12/5.   - Pt has been unable to wean from ventilator, s/p Trached by CTSx on 12/18..   - s/p Imipenem (12/16- 12/28) 500mg q12h x 2 weeks for cavitation and MSSA,  - s/p Ceftazidime total of 7 days (12/29 - 1/5) (due to resistance to Levaquin)  - s/p Trach exchange to 8Fr Bivona w Cannula at 13.5 cm by CTSx on 12/31 due to previous large air leaks  1/12  - Sputum cx sent on 1/9 showing +P.aeruginosa (Carbapenem resistant and Stenotrophomonas Maltophila)  - ID following, will start on Ceftazidime  - Rpt Blood cx tonight, please follow up    # Hypernatremia /#Hypokalemia resolved    # Hypomagnesemia  - Mg 1.3, s/p Mgsulfate 2gm x 2  - f/u with repeat BMP this evening    # Hx of DVT (on Xarelto at home)  - CTA negative for PE  - c/w Lovenox for now.     # ARTEMIO 2/2 ATN   - s/p CRRT, and Bumex and intermittent HD.   - Last HD 12/17, Bumex gtt d/c'ed. Now with good UOP   - c/w Rivera for now, will consider TOV in the near future  - Strict I’s and O’s    # Diet  - 16F g PEG tube placed on 1/11 by PEMA singh'shagufta  - ID following - start with Flagyl, and one dose of Vancomycin on 1/12  - c/w bowel regimen  - c/w NPH q6hr, and ISS - Monitor FS routinely  - FS accucheck <100 past 24 h -Stop NPH and monitor - still controlled off NPH for now      #HTN   - c/w Amlodipine /Metoprolol   - Monitor BP with parameters     #Pressure Ulcer  - Continue Wound recs for now   - Plastics consulted - rec Santyl to cheek/chin area bid and will follow     #Dispo  - RCU  - PT/OT - PT/OT reconsulted and PT on program   - d/c most likely to vent rehab facility

## 2021-01-12 NOTE — PROGRESS NOTE ADULT - ASSESSMENT
59 yo woman with AARON, h/o Lockett's procedure, s/p reversal 2011 who was admitted to [facility] 11/17 with covid pneumonia.  She received course of remdesevir and dexamethasone; required intubation 11/23 for acute hypoxemic respiratory failure. Hospital course complicated by Proteus bacteremia, MSSA bacteremia/ pneumonia, and Stenotrophomonas respiratory infection, as well as ATN requiring CVVHD --> HD ---> off dialysis.  CT chest 12/4 new large consolidation with cavitation in right lung.  Blood culture 11/27 MSSA and proteus; 11/28 MSSA.     Blood cultures 12/1, 12/11, 12/21, 12/27 no growth.  Sputum culture 12/1, 12/12, 12/27, 12/29, 1/9 Stenotrophomonas.    Multifocal covid pneumonia with respiratory failure, MSSA bacteremia 11/27 with cavitary pneumonia, Proteus bacteremia 11/27, Stenotrophomonas pneumonia.  s/p prolonged antibiotics for bacteremia, pneumonia, aspiration.  s/p trach 12/18.  12/29 Fever, blood tinged sputum and Stenotrophomonas in sputum.  meropenem changed to ceftazidime  --> 1/5 12/31 trach leak.  s/p OR 12/31 for trach change with CT surgery.     1/11 IR gastrostomy placement   1/12 fever pseudomonas and Stenotrophomonas in sputum.  ceftazidime started     Fever post gastrostomy procedure       suggest:  would check blood cultures  agree with ceftazidime to cover pseudomonas and Stenotrophomonas  would add flagyl 500 mg iv q 8 h  would add vanco 1 gm iv x 1      Mila Burgos MD  Pager: [phone]  After 5 PM or weekends please call fellow on call or office [phone]

## 2021-01-12 NOTE — PROGRESS NOTE ADULT - SUBJECTIVE AND OBJECTIVE BOX
CHIEF COMPLAINT: Patient is a 60y old  Female who presents with a chief complaint of Transfer from [facility] (11 Jan 2021 09:16)    Interval Events:    REVIEW OF SYSTEMS:  Constitutional:   Eyes:  ENT:  CV:  Resp:  GI:  :  MSK:  Integumentary:  Neurological:  Psychiatric:  Endocrine:  Hematologic/Lymphatic:  Allergic/Immunologic:  [ ] All other systems negative  [ ] Unable to assess ROS because ________    OBJECTIVE:  ICU Vital Signs Last 24 Hrs  T(C): 37.8 (12 Jan 2021 06:18), Max: 37.8 (12 Jan 2021 06:18)  T(F): 100.1 (12 Jan 2021 06:18), Max: 100.1 (12 Jan 2021 06:18)  HR: 100 (12 Jan 2021 07:30) (90 - 114)  BP: 132/57 (12 Jan 2021 06:18) (126/66 - 159/68)  BP(mean): --  ABP: --  ABP(mean): --  RR: 19 (12 Jan 2021 06:18) (15 - 25)  SpO2: 100% (12 Jan 2021 07:30) (100% - 100%)    Mode: AC/ CMV (Assist Control/ Continuous Mandatory Ventilation), RR (machine): 12, TV (machine): 450, FiO2: 40, PEEP: 5, MAP: 17, PIP: 37    01-11 @ 07:01  -  01-12 @ 07:00  --------------------------------------------------------  IN: 0 mL / OUT: 1050 mL / NET: -1050 mL      CAPILLARY BLOOD GLUCOSE          PHYSICAL EXAM:  General:   HEENT:   Lymph Nodes:  Neck:   Respiratory:   Cardiovascular:   Abdomen:   Extremities:   Skin:   Neurological:  Psychiatry:    HOSPITAL MEDICATIONS:  MEDICATIONS  (STANDING):  amLODIPine   Tablet 5 milliGRAM(s) Oral daily  chlorhexidine 4% Liquid 1 Application(s) Topical daily  collagenase Ointment 1 Application(s) Topical two times a day  dextrose 5% + sodium chloride 0.45%. 1000 milliLiter(s) (60 mL/Hr) IV Continuous <Continuous>  enoxaparin Injectable 100 milliGRAM(s) SubCutaneous every 12 hours  insulin lispro (ADMELOG) corrective regimen sliding scale   SubCutaneous every 6 hours  lactobacillus acidophilus 1 Tablet(s) Oral two times a day  levoFLOXacin IVPB      levoFLOXacin IVPB 750 milliGRAM(s) IV Intermittent once  magnesium sulfate  IVPB 2 Gram(s) IV Intermittent every 1 hour  metoprolol tartrate 50 milliGRAM(s) Oral two times a day  metoprolol tartrate Injectable 5 milliGRAM(s) IV Push every 6 hours  pantoprazole  Injectable 40 milliGRAM(s) IV Push daily  psyllium Powder 1 Packet(s) Oral two times a day    MEDICATIONS  (PRN):  acetaminophen    Suspension .. 650 milliGRAM(s) Oral every 6 hours PRN Temp greater or equal to 38C (100.4F)      LABS:                        8.7    13.22 )-----------( 452      ( 12 Jan 2021 07:10 )             30.1     01-12    143  |  102  |  6<L>  ----------------------------<  92  3.8   |  31  |  0.30<L>    Ca    8.5      12 Jan 2021 07:10  Phos  3.4     01-12  Mg     1.3     01-12                MICROBIOLOGY:     RADIOLOGY:  [ ] Reviewed and interpreted by me    PULMONARY FUNCTION TESTS:    EKG: CHIEF COMPLAINT: Patient is a 60y old  Female who presents with a chief complaint of Transfer from [facility] (11 Jan 2021 09:16)    Interval Events: Fever in the early morning, Tylenol given. Pt seen and examined at bedside. Clinically unchanged. Unable to assess ROS due to AMS. Sputum cx showing P. aeruginosa, and Stenotrophomonas maltophila sens to Ceftazidime. s/p IR PEG placement on 1/11. ID consulted, recs appreciated. Pt saturating well on current vent settings.     REVIEW OF SYSTEMS:    [x] Unable to assess ROS because AMS    OBJECTIVE:  ICU Vital Signs Last 24 Hrs  T(C): 37.8 (12 Jan 2021 06:18), Max: 37.8 (12 Jan 2021 06:18)  T(F): 100.1 (12 Jan 2021 06:18), Max: 100.1 (12 Jan 2021 06:18)  HR: 100 (12 Jan 2021 07:30) (90 - 114)  BP: 132/57 (12 Jan 2021 06:18) (126/66 - 159/68)  BP(mean): --  ABP: --  ABP(mean): --  RR: 19 (12 Jan 2021 06:18) (15 - 25)  SpO2: 100% (12 Jan 2021 07:30) (100% - 100%)    Mode: AC/ CMV (Assist Control/ Continuous Mandatory Ventilation), RR (machine): 12, TV (machine): 450, FiO2: 40, PEEP: 5, MAP: 17, PIP: 37    01-11 @ 07:01  -  01-12 @ 07:00  --------------------------------------------------------  IN: 0 mL / OUT: 1050 mL / NET: -1050 mL      CAPILLARY BLOOD GLUCOSE      PHYSICAL EXAM  General: obese female laying in bed, NAD, Saturating well on Current vent settings  Neck: +trach to vent, area c/d/i  Respiratory: +coarse breath sounds b/l  Cardiovascular: +s1, s2   Abdomen: +BS, obese, soft, NT/ND  Extremities: +trace edema noted b/l  Skin: As per AAI sheet   Neurological: open eyes, able to track  Psychiatry: Appropriate      HOSPITAL MEDICATIONS:  MEDICATIONS  (STANDING):  amLODIPine   Tablet 5 milliGRAM(s) Oral daily  chlorhexidine 4% Liquid 1 Application(s) Topical daily  collagenase Ointment 1 Application(s) Topical two times a day  dextrose 5% + sodium chloride 0.45%. 1000 milliLiter(s) (60 mL/Hr) IV Continuous <Continuous>  enoxaparin Injectable 100 milliGRAM(s) SubCutaneous every 12 hours  insulin lispro (ADMELOG) corrective regimen sliding scale   SubCutaneous every 6 hours  lactobacillus acidophilus 1 Tablet(s) Oral two times a day  levoFLOXacin IVPB      levoFLOXacin IVPB 750 milliGRAM(s) IV Intermittent once  magnesium sulfate  IVPB 2 Gram(s) IV Intermittent every 1 hour  metoprolol tartrate 50 milliGRAM(s) Oral two times a day  metoprolol tartrate Injectable 5 milliGRAM(s) IV Push every 6 hours  pantoprazole  Injectable 40 milliGRAM(s) IV Push daily  psyllium Powder 1 Packet(s) Oral two times a day    MEDICATIONS  (PRN):  acetaminophen    Suspension .. 650 milliGRAM(s) Oral every 6 hours PRN Temp greater or equal to 38C (100.4F)      LABS:                        8.7    13.22 )-----------( 452      ( 12 Jan 2021 07:10 )             30.1     01-12    143  |  102  |  6<L>  ----------------------------<  92  3.8   |  31  |  0.30<L>    Ca    8.5      12 Jan 2021 07:10  Phos  3.4     01-12  Mg     1.3     01-12                MICROBIOLOGY:     RADIOLOGY:  [ ] Reviewed and interpreted by me    PULMONARY FUNCTION TESTS:    EKG:

## 2021-01-12 NOTE — PROGRESS NOTE ADULT - SUBJECTIVE AND OBJECTIVE BOX
Follow Up:      Inverval History/ROS:Patient is a 60y old  Female who presents with a chief complaint of Transfer from [facility] (22 Dec 2020 08:13)     s/p IR gastrostomy tube placement 1/11.   febrile today.  ceftazidime restarted.      OR for trach change with CT surgery 12/31.           Allergies    Kiwi (Unknown)  latex (Anaphylaxis)  latex (Unknown)  penicillin (Other)  penicillin (Unknown)  perfume  hives (Other)  potassium acetate (Other)  soap additives hives (Other)    Intolerances        ANTIMICROBIALS: cefTAZidime  IVPB 2 every 8 hours    MEDICATIONS  (STANDING):  acetaminophen    Suspension .. 650 every 6 hours PRN  amLODIPine   Tablet 5 daily  enoxaparin Injectable 100 every 12 hours  insulin lispro (ADMELOG) corrective regimen sliding scale  every 6 hours  metoprolol tartrate 50 two times a day  metoprolol tartrate Injectable 5 every 6 hours  pantoprazole  Injectable 40 daily  psyllium Powder 1 two times a day    Vital Signs Last 24 Hrs  T(F): 99.2 (01-12-21 @ 16:32), Max: 100.6 (01-12-21 @ 08:43)  HR: 97 (01-12-21 @ 16:32)  BP: 142/61 (01-12-21 @ 16:32)  RR: 15 (01-12-21 @ 16:32)  SpO2: 100% (01-12-21 @ 16:32) (99% - 100%)    PHYSICAL EXAM:  General: [x ] trach, lethargic  ENT:  eschar chin, trach,  Cardiovascular:   distant  Respiratory:  clear ant  GI:  [x ] soft, scars, hernia, gastrostomy tube with bilious fluid, rectal tube  :  [x ] negro   Musculoskeletal: weak  Neurologic:  unable   Skin:   no rash  Psychiatric:  unable                            8.7    13.22 )-----------( 452      ( 12 Jan 2021 07:10 )             30.1 01-12    143  |  102  |  6   ----------------------------<  92  3.8   |  31  |  0.30  Ca    8.5      12 Jan 2021 07:10Phos  3.4     01-12Mg     1.3     01-12        MICROBIOLOGY:    cuCulture - Sputum . (01.09.21 @ 18:30)   - Amikacin: S <=16   - Aztreonam: I 16   - Cefepime: S 8   - Ceftazidime: S 4   - Ceftazidime: S 4   - Ciprofloxacin: S 0.5   - Gentamicin: S <=2   - Imipenem: R >8   - Levofloxacin: S 2   - Levofloxacin: S <=0.5   - Meropenem: R >8   - Piperacillin/Tazobactam: S 16   - Tobramycin: S <=2   - Trimethoprim/Sulfamethoxazole: S <=0.5/9.5   Gram Stain:   Few polymorphonuclear leukocytes per low power field   No Squamous epithelial cells per low power field   Numerous Gram Negative Rods seen per oil power field   Specimen Source: .Sputum Sputum   Culture Results:   Numerous Pseudomonas aeruginosa (Carbapenem Resistant)   Numerous Stenotrophomonas maltophilia   Normal Respiratory Jocelynn absent   Organism Identification: Pseudomonas aeruginosa (Carbapenem Resistant)   Stenotrophomonas maltophilia     RADIOLOGY:    rd< from: Xray Chest 1 View-PORTABLE IMMEDIATE (Xray Chest 1 View-PORTABLE IMMEDIATE .) (12.19.20 @ 20:25) >    rd< from: Xray Chest 1 View- PORTABLE-Urgent (Xray Chest 1 View- PORTABLE-Urgent .) (01.02.21 @ 22:42) >    EXAM:  XR CHEST PORTABLE URGENT 1V        PROCEDURE DATE:  Jan 2 2021         INTERPRETATION:  Chest one view    HISTORY: Feeding tube advancement    COMPARISON STUDY: Earlier the same day    Frontal expiratory view of the chest shows the heart dax similar in size. Tracheostomy tube is again noted. Feeding tube is been advanced into the stomach.    The lungs show similar patchy infiltrates, right larger than left. and there is no evidence of pneumothorax nor pleural effusion.    IMPRESSION:  Feeding tube to stomach.    Thank you for the courtesy of this referral.    < end of copied text >

## 2021-01-13 LAB
ANION GAP SERPL CALC-SCNC: 9 MMOL/L — SIGNIFICANT CHANGE UP (ref 7–14)
BUN SERPL-MCNC: 8 MG/DL — SIGNIFICANT CHANGE UP (ref 7–23)
CALCIUM SERPL-MCNC: 8.3 MG/DL — LOW (ref 8.4–10.5)
CHLORIDE SERPL-SCNC: 102 MMOL/L — SIGNIFICANT CHANGE UP (ref 98–107)
CO2 SERPL-SCNC: 30 MMOL/L — SIGNIFICANT CHANGE UP (ref 22–31)
CREAT SERPL-MCNC: 0.33 MG/DL — LOW (ref 0.5–1.3)
GLUCOSE BLDC GLUCOMTR-MCNC: 114 MG/DL — HIGH (ref 70–99)
GLUCOSE BLDC GLUCOMTR-MCNC: 133 MG/DL — HIGH (ref 70–99)
GLUCOSE BLDC GLUCOMTR-MCNC: 98 MG/DL — SIGNIFICANT CHANGE UP (ref 70–99)
GLUCOSE SERPL-MCNC: 98 MG/DL — SIGNIFICANT CHANGE UP (ref 70–99)
HCT VFR BLD CALC: 28.9 % — LOW (ref 34.5–45)
HGB BLD-MCNC: 8.2 G/DL — LOW (ref 11.5–15.5)
MAGNESIUM SERPL-MCNC: 1.8 MG/DL — SIGNIFICANT CHANGE UP (ref 1.6–2.6)
MCHC RBC-ENTMCNC: 28.4 GM/DL — LOW (ref 32–36)
MCHC RBC-ENTMCNC: 28.4 PG — SIGNIFICANT CHANGE UP (ref 27–34)
MCV RBC AUTO: 100 FL — SIGNIFICANT CHANGE UP (ref 80–100)
NRBC # BLD: 0 /100 WBCS — SIGNIFICANT CHANGE UP
NRBC # FLD: 0 K/UL — SIGNIFICANT CHANGE UP
PHOSPHATE SERPL-MCNC: 3.2 MG/DL — SIGNIFICANT CHANGE UP (ref 2.5–4.5)
PLATELET # BLD AUTO: 396 K/UL — SIGNIFICANT CHANGE UP (ref 150–400)
POTASSIUM SERPL-MCNC: 3.4 MMOL/L — LOW (ref 3.5–5.3)
POTASSIUM SERPL-SCNC: 3.4 MMOL/L — LOW (ref 3.5–5.3)
RBC # BLD: 2.89 M/UL — LOW (ref 3.8–5.2)
RBC # FLD: 18.5 % — HIGH (ref 10.3–14.5)
SODIUM SERPL-SCNC: 141 MMOL/L — SIGNIFICANT CHANGE UP (ref 135–145)
WBC # BLD: 10.13 K/UL — SIGNIFICANT CHANGE UP (ref 3.8–10.5)
WBC # FLD AUTO: 10.13 K/UL — SIGNIFICANT CHANGE UP (ref 3.8–10.5)

## 2021-01-13 PROCEDURE — 99232 SBSQ HOSP IP/OBS MODERATE 35: CPT

## 2021-01-13 PROCEDURE — 99233 SBSQ HOSP IP/OBS HIGH 50: CPT

## 2021-01-13 RX ORDER — POTASSIUM CHLORIDE 20 MEQ
40 PACKET (EA) ORAL EVERY 4 HOURS
Refills: 0 | Status: COMPLETED | OUTPATIENT
Start: 2021-01-13 | End: 2021-01-13

## 2021-01-13 RX ADMIN — Medication 100 MILLIGRAM(S): at 06:11

## 2021-01-13 RX ADMIN — CEFTAZIDIME 100 GRAM(S): 6 INJECTION, POWDER, FOR SOLUTION INTRAVENOUS at 05:49

## 2021-01-13 RX ADMIN — Medication 1 APPLICATION(S): at 16:35

## 2021-01-13 RX ADMIN — AMLODIPINE BESYLATE 5 MILLIGRAM(S): 2.5 TABLET ORAL at 05:49

## 2021-01-13 RX ADMIN — Medication 1 APPLICATION(S): at 05:49

## 2021-01-13 RX ADMIN — Medication 40 MILLIEQUIVALENT(S): at 16:35

## 2021-01-13 RX ADMIN — ENOXAPARIN SODIUM 100 MILLIGRAM(S): 100 INJECTION SUBCUTANEOUS at 06:11

## 2021-01-13 RX ADMIN — Medication 1 TABLET(S): at 16:36

## 2021-01-13 RX ADMIN — Medication 1 TABLET(S): at 05:49

## 2021-01-13 RX ADMIN — Medication 50 MILLIGRAM(S): at 05:49

## 2021-01-13 RX ADMIN — CEFTAZIDIME 100 GRAM(S): 6 INJECTION, POWDER, FOR SOLUTION INTRAVENOUS at 13:36

## 2021-01-13 RX ADMIN — CEFTAZIDIME 100 GRAM(S): 6 INJECTION, POWDER, FOR SOLUTION INTRAVENOUS at 21:46

## 2021-01-13 RX ADMIN — PANTOPRAZOLE SODIUM 40 MILLIGRAM(S): 20 TABLET, DELAYED RELEASE ORAL at 11:09

## 2021-01-13 RX ADMIN — ENOXAPARIN SODIUM 100 MILLIGRAM(S): 100 INJECTION SUBCUTANEOUS at 16:35

## 2021-01-13 RX ADMIN — Medication 1 PACKET(S): at 05:49

## 2021-01-13 RX ADMIN — Medication 100 MILLIGRAM(S): at 13:37

## 2021-01-13 RX ADMIN — Medication 50 MILLIGRAM(S): at 16:36

## 2021-01-13 RX ADMIN — Medication 40 MILLIEQUIVALENT(S): at 11:08

## 2021-01-13 RX ADMIN — CHLORHEXIDINE GLUCONATE 1 APPLICATION(S): 213 SOLUTION TOPICAL at 11:09

## 2021-01-13 RX ADMIN — Medication 1 PACKET(S): at 16:36

## 2021-01-13 RX ADMIN — Medication 100 MILLIGRAM(S): at 21:45

## 2021-01-13 NOTE — PROGRESS NOTE ADULT - SUBJECTIVE AND OBJECTIVE BOX
CHIEF COMPLAINT:    Interval Events:    REVIEW OF SYSTEMS:  Constitutional:   Eyes:  ENT:  CV:  Resp:  GI:  :  MSK:  Integumentary:  Neurological:  Psychiatric:  Endocrine:  Hematologic/Lymphatic:  Allergic/Immunologic:  [ ] All other systems negative  [ ] Unable to assess ROS because ________    OBJECTIVE:  ICU Vital Signs Last 24 Hrs  T(C): 37.1 (13 Jan 2021 05:46), Max: 37.5 (12 Jan 2021 11:40)  T(F): 98.7 (13 Jan 2021 05:46), Max: 99.5 (12 Jan 2021 11:40)  HR: 75 (13 Jan 2021 07:34) (75 - 99)  BP: 140/54 (13 Jan 2021 05:46) (129/56 - 142/61)  BP(mean): --  ABP: --  ABP(mean): --  RR: 14 (13 Jan 2021 05:46) (13 - 19)  SpO2: 100% (13 Jan 2021 07:34) (99% - 100%)    Mode: AC/ CMV (Assist Control/ Continuous Mandatory Ventilation), RR (machine): 12, TV (machine): 450, FiO2: 40, PEEP: 5, MAP: 18, PIP: 40    01-12 @ 07:01  -  01-13 @ 07:00  --------------------------------------------------------  IN: 40 mL / OUT: 600 mL / NET: -560 mL      CAPILLARY BLOOD GLUCOSE      POCT Blood Glucose.: 98 mg/dL (13 Jan 2021 05:18)      PHYSICAL EXAM:  General:   HEENT:   Lymph Nodes:  Neck:   Respiratory:   Cardiovascular:   Abdomen:   Extremities:   Skin:   Neurological:  Psychiatry:    HOSPITAL MEDICATIONS:  MEDICATIONS  (STANDING):  amLODIPine   Tablet 5 milliGRAM(s) Oral daily  cefTAZidime  IVPB 2 Gram(s) IV Intermittent every 8 hours  chlorhexidine 4% Liquid 1 Application(s) Topical daily  collagenase Ointment 1 Application(s) Topical two times a day  dextrose 5% + sodium chloride 0.45%. 1000 milliLiter(s) (60 mL/Hr) IV Continuous <Continuous>  enoxaparin Injectable 100 milliGRAM(s) SubCutaneous every 12 hours  insulin lispro (ADMELOG) corrective regimen sliding scale   SubCutaneous every 6 hours  lactobacillus acidophilus 1 Tablet(s) Oral two times a day  metoprolol tartrate 50 milliGRAM(s) Oral two times a day  metroNIDAZOLE  IVPB      metroNIDAZOLE  IVPB 500 milliGRAM(s) IV Intermittent every 8 hours  pantoprazole  Injectable 40 milliGRAM(s) IV Push daily  potassium chloride   Powder 40 milliEquivalent(s) Oral every 4 hours  psyllium Powder 1 Packet(s) Oral two times a day    MEDICATIONS  (PRN):  acetaminophen    Suspension .. 650 milliGRAM(s) Oral every 6 hours PRN Temp greater or equal to 38C (100.4F)      LABS:                        8.2    10.13 )-----------( 396      ( 13 Jan 2021 05:51 )             28.9     01-13    141  |  102  |  8   ----------------------------<  98  3.4<L>   |  30  |  0.33<L>    Ca    8.3<L>      13 Jan 2021 05:51  Phos  3.2     01-13  Mg     1.8     01-13                MICROBIOLOGY:     RADIOLOGY:  [ ] Reviewed and interpreted by me    PULMONARY FUNCTION TESTS:    EKG: CHIEF COMPLAINT: Patient is a 60y old  Female who presents with a chief complaint of Transfer from [facility] (13 Jan 2021 08:38)      Interval Events: Pt seen at bedside with team     REVIEW OF SYSTEMS:  Constitutional: No fever /chills /pain   ENT: Denies   CV: Denies   Resp: Denies   GI: Denies   MSK: weakness     [x ] All other systems negative      OBJECTIVE:  ICU Vital Signs Last 24 Hrs  T(C): 37.1 (13 Jan 2021 05:46), Max: 37.5 (12 Jan 2021 11:40)  T(F): 98.7 (13 Jan 2021 05:46), Max: 99.5 (12 Jan 2021 11:40)  HR: 75 (13 Jan 2021 07:34) (75 - 99)  BP: 140/54 (13 Jan 2021 05:46) (129/56 - 142/61)  BP(mean): --  ABP: --  ABP(mean): --  RR: 14 (13 Jan 2021 05:46) (13 - 19)  SpO2: 100% (13 Jan 2021 07:34) (99% - 100%)    Mode: AC/ CMV (Assist Control/ Continuous Mandatory Ventilation), RR (machine): 12, TV (machine): 450, FiO2: 40, PEEP: 5, MAP: 18, PIP: 40    01-12 @ 07:01  -  01-13 @ 07:00  --------------------------------------------------------  IN: 40 mL / OUT: 600 mL / NET: -560 mL      CAPILLARY BLOOD GLUCOSE      POCT Blood Glucose.: 98 mg/dL (13 Jan 2021 05:18)        HOSPITAL MEDICATIONS:  MEDICATIONS  (STANDING):  amLODIPine   Tablet 5 milliGRAM(s) Oral daily  cefTAZidime  IVPB 2 Gram(s) IV Intermittent every 8 hours  chlorhexidine 4% Liquid 1 Application(s) Topical daily  collagenase Ointment 1 Application(s) Topical two times a day  dextrose 5% + sodium chloride 0.45%. 1000 milliLiter(s) (60 mL/Hr) IV Continuous <Continuous>  enoxaparin Injectable 100 milliGRAM(s) SubCutaneous every 12 hours  insulin lispro (ADMELOG) corrective regimen sliding scale   SubCutaneous every 6 hours  lactobacillus acidophilus 1 Tablet(s) Oral two times a day  metoprolol tartrate 50 milliGRAM(s) Oral two times a day  metroNIDAZOLE  IVPB      metroNIDAZOLE  IVPB 500 milliGRAM(s) IV Intermittent every 8 hours  pantoprazole  Injectable 40 milliGRAM(s) IV Push daily  potassium chloride   Powder 40 milliEquivalent(s) Oral every 4 hours  psyllium Powder 1 Packet(s) Oral two times a day    MEDICATIONS  (PRN):  acetaminophen    Suspension .. 650 milliGRAM(s) Oral every 6 hours PRN Temp greater or equal to 38C (100.4F)      LABS:                        8.2    10.13 )-----------( 396      ( 13 Jan 2021 05:51 )             28.9     01-13    141  |  102  |  8   ----------------------------<  98  3.4<L>   |  30  |  0.33<L>    Ca    8.3<L>      13 Jan 2021 05:51  Phos  3.2     01-13  Mg     1.8     01-13                MICROBIOLOGY:     RADIOLOGY:  [ ] Reviewed and interpreted by me    PULMONARY FUNCTION TESTS:    EKG:

## 2021-01-13 NOTE — CHART NOTE - NSCHARTNOTEFT_GEN_A_CORE
Nutrition Follow-Up Note   Reason for follow-up: Critical care length of stay follow- up. Medical record reviewed.    Diet Order: Diet, NPO:   NPO for Procedure/Test     NPO Start Date: 10-Camilo-2021,   NPO Start Time: 23:55  Except Medications (01-08-21 @ 17:45)  Diet, NPO with Tube Feed:   Tube Feeding Modality: Nasogastric  Glucerna 1.5 Pelon (GLUCERNA1.5RTH)  Total Volume for 24 Hours (mL): 720  Continuous  Starting Tube Feed Rate {mL per Hour}: 10  Increase Tube Feed Rate by (mL): 10     Every 4 hours  Until Goal Tube Feed Rate (mL per Hour): 30  Tube Feed Duration (in Hours): 24  Tube Feed Start Time: 16:00  No Carb Prosource TF     Qty per Day:  3 (01-03-21 @ 19:36)    CURRENT EN ORDER PROVIDES:  Total Volume: 720mL/day  Energy: 1080Kcal/day  Protein: 104g protein/day  Free Water: 546mL/day    Interval history: Patient intubated 11/23, course complicated by superimposed PNA and bacteremia, MSSA/P. mirablis bacteremia also with lung abscesses on CT chest on 12/5 , ARTEMIO/ATN requiring CRRT and HD, Last HD on 12/17/20.  Transferred from [facility] on 12/10 to offload COVID unit. Unable to wean from ventilator, s/p tracheostmy 12/18. Patient s/p PEG placement 1/11.     Nutrition Related Information: Patient continues on tube feeds of Glucerna 1.5 via PEG. Running at current goal rate of 30mL/hr. Current TF order providing suboptimal energy needs. Would consider increasing goal rate, recs to follow below. No tube feeding intolerances reported. Finger sticks WDL. Na WDL - ordered for free water bolus 400mL q6hr at this time.    GI: Last BM 1/12, no N/V or abdominal distention reported.    Anthropometric Measurements:   Height (cm): 160   Weight (kg): 119.9 (1/13, via bed-scale), 126.4 (12/30, bed-scale), 128.3 (12/19), 114 (12/14),115 (12/10)  *Noted weight changes, possibly due to fluid shifts, scale discrepancies versus true dry weight loss  BMI (kg/m2): 44.9 (12/10)  IBW: 52.52kg/m^2      Medications: MEDICATIONS  (STANDING):  amLODIPine   Tablet 5 milliGRAM(s) Oral daily  cefTAZidime  IVPB 2 Gram(s) IV Intermittent every 8 hours  dextrose 5% + sodium chloride 0.45%. 1000 milliLiter(s) (60 mL/Hr) IV Continuous <Continuous>  enoxaparin Injectable 100 milliGRAM(s) SubCutaneous every 12 hours  insulin lispro (ADMELOG) corrective regimen sliding scale   SubCutaneous every 6 hours  lactobacillus acidophilus 1 Tablet(s) Oral two times a day  metoprolol tartrate 50 milliGRAM(s) Oral two times a day  metroNIDAZOLE  IVPB      metroNIDAZOLE  IVPB 500 milliGRAM(s) IV Intermittent every 8 hours  pantoprazole  Injectable 40 milliGRAM(s) IV Push daily  potassium chloride   Powder 40 milliEquivalent(s) Oral every 4 hours  psyllium Powder 1 Packet(s) Oral two times a day    Labs: 01-13 @ 05:51: Sodium 141, Potassium 3.4<L>, Calcium 8.3<L>, Magnesium 1.8, Phosphorus 3.2, BUN 8, Creatinine 0.33<L>, Glucose 98, Alk Phos --, ALT/SGPT --, AST/SGOT --, Albumin --, Prealbumin --, Total Bilirubin --, Hemoglobin 8.2<L>, Hematocrit 28.9<L>, Ferritin --, C-Reactive Protein --, Creatine Kinase <<27>  01-12 @ 20:43: Sodium 139, Potassium 3.2<L>, Calcium 8.2<L>, Magnesium 1.9, Phosphorus 3.1, BUN 6<L>, Creatinine 0.32<L>, Glucose 139<H>, Alk Phos --, ALT/SGPT --, AST/SGOT --, Albumin --, Prealbumin --, Total Bilirubin --, Hemoglobin --, Hematocrit --, Ferritin --, C-Reactive Protein --, Creatine Kinase <<27>    POCT Blood Glucose.: 98 mg/dL (01-13-21 @ 05:18)  POCT Blood Glucose.: 95 mg/dL (01-12-21 @ 23:49)  POCT Blood Glucose.: 103 mg/dL (01-12-21 @ 16:36)  POCT Blood Glucose.: 91 mg/dL (01-12-21 @ 11:32)    Skin: left cheek sacrum and left chin unstageable pressure injuries  Edema: 1+ generalized, 3+ left/right arm, hand and generalized     Estimated Nutrition Needs: calculated using admit weight (115kg) for energy needs and IBW for protein needs  Estimated Energy Needs (11-14Kcal/kg): 1265-1610Kcal/day  Estimated Protein Needs (2-2.5g/kg): 104-130g protein/day    Nutrition Interventions/Recommendations:   1. Recommend Glucerna 1.5 via NGT @ 35mL/hr x24 hours + No Carb Prosource (1pkg = 15 gm protein) 3x daily [provides 840mL total volume, 1260Kcal, 114g protein].  2. Feeds will provide ~637mL free water, additional free water per physician discretion.  3. Obtain weekly weights.   4. Consider multivitamin daily for micronutrient coverage.  5. Bowel regimen PRN.  6. Further adjustments to rate/volume/duration/free water provision of enteral feeds will be determined in accordance with long term patient tolerance, needs and weight trends.     Monitoring and Evaluation:   Monitor nutrition provision, tolerance to diet, weights, labs, skin integrity and GI function.  RD remains available, please consult as needed. Will follow up per protocol.  Xochitl Larson, MS, RD, CDN, [facility] Pager #[phone].

## 2021-01-13 NOTE — PROGRESS NOTE ADULT - ASSESSMENT
This is a 60 year old Female with a PMH of HTN, DVT on Xarelto, peritonitis s/p Oral's procedure and ileostomy (reversed 2011), AARON who presented to [facility] on 11/18  w/ fevers found to have COVID-19, intubated 11/23, course complicated by superimposed PNA and bacteremia with Stenotrophomonas (12/11, completed Levaquin x7 days), MSSA/P. mirablis bacteremia (on Cefazolin, potentially due to urine vs line-associated and MSSA in sputum due to PNA), also with lung abscesses on CT chest on 12/5 , ARTEMIO/ATN requiring CRRT and HD. Now unable to wean from ventilator and transferred from [facility] on 12/10 to offload COVID unit. Unable to wean from ventilator, s/p tracheostmy with ctsx 12/18 planned for PEG 12/24 but unable to perform due to large ventral hernia     # AMS  - Alert & oriented x3 @baseline.    - s/p wean off Methadone and Valium 5mg TID  - Spot EEG - abnormal study- Moderate nonspecific diffuse or multifocal cerebral dysfunction   - No purposeful movements appreciated.  - PT/OT seeing pt when more alert/following commands     # Acute hypoxemic respiratory failure 2/2 covid-19 PNA / MSSA Bacteremia/Pneumonia,  - s/p intubated 11/23. Course complicated by MSSA pneumonia, stenotrophomonas, p. mirabalis, and lung abscess on CT chest from 12/5.   - Pt has been unable to wean from ventilator, s/p Trached by CTSx on 12/18..   - s/p Imipenem (12/16- 12/28) 500mg q12h x 2 weeks for cavitation and MSSA,  - s/p Ceftazidime total of 7 days (12/29 - 1/5) (due to resistance to Levaquin)  - s/p Trach exchange to 8Fr Bivona w Cannula at 13.5 cm by CTSx on 12/31 due to previous large air leaks  1/12  - Sputum cx sent on 1/9 showing +P.aeruginosa (Carbapenem resistant and Stenotrophomonas Maltophila)  - ID following, will start on Ceftazidime  - Rpt Blood cx tonight, please follow up    # Hypernatremia /#Hypokalemia resolved    # Hypomagnesemia  - Mg 1.3, s/p Mgsulfate 2gm x 2  - f/u with repeat BMP this evening    # Hx of DVT (on Xarelto at home)  - CTA negative for PE  - c/w Lovenox for now.     # ARTEMIO 2/2 ATN   - s/p CRRT, and Bumex and intermittent HD.   - Last HD 12/17, Bumex gtt d/c'ed. Now with good UOP   - c/w Rivera for now, will consider TOV in the near future  - Strict I’s and O’s    # Diet  - 16F g PEG tube placed on 1/11 by PEMA singh'shagufta  - ID following - start with Flagyl, and one dose of Vancomycin on 1/12  - c/w bowel regimen  - c/w NPH q6hr, and ISS - Monitor FS routinely  - FS accucheck <100 past 24 h -Stop NPH and monitor - still controlled off NPH for now      #HTN   - c/w Amlodipine /Metoprolol   - Monitor BP with parameters     #Pressure Ulcer  - Continue Wound recs for now   - Plastics consulted - rec Santyl to cheek/chin area bid and will follow     #Dispo  - RCU  - PT/OT - PT/OT reconsulted and PT on program   - d/c most likely to vent rehab facility    This is a 60 year old Female with a PMH of HTN, DVT on Xarelto, peritonitis s/p Oral's procedure and ileostomy (reversed 2011), AARON who presented to [facility] on 11/18  w/ fevers found to have COVID-19, intubated 11/23, course complicated by superimposed PNA and bacteremia with Stenotrophomonas (12/11, completed Levaquin x7 days), MSSA/P. mirablis bacteremia (on Cefazolin, potentially due to urine vs line-associated and MSSA in sputum due to PNA), also with lung abscesses on CT chest on 12/5 , ARTEMIO/ATN requiring CRRT and HD. Now unable to wean from ventilator and transferred from [facility] on 12/10 to offload COVID unit. Unable to wean from ventilator, s/p tracheostmy with ctsx 12/18 planned for PEG 12/24 but unable to perform due to large ventral hernia     # AMS  - Alert & oriented x3 @baseline.    - s/p wean off Methadone and Valium 5mg TID  - Spot EEG - abnormal study- Moderate nonspecific diffuse or multifocal cerebral dysfunction   - No purposeful movements appreciated.  - PT/OT seeing pt when more alert/following commands   - Following commands     # Acute hypoxemic respiratory failure 2/2 covid-19 PNA / MSSA Bacteremia/Pneumonia,  - s/p intubated 11/23. Course complicated by MSSA pneumonia, stenotrophomonas, p. mirabalis, and lung abscess on CT chest from 12/5.   - Pt has been unable to wean from ventilator, s/p Trached by CTSx on 12/18..   - s/p Imipenem (12/16- 12/28) 500mg q12h x 2 weeks for cavitation and MSSA,  - s/p Ceftazidime total of 7 days (12/29 - 1/5) (due to resistance to Levaquin)  - s/p Trach exchange to 8Fr Bivona w Cannula at 13.5 cm by CTSx on 12/31 due to previous large air leaks  1/12  - Sputum cx sent on 1/9 showing +P.aeruginosa (Carbapenem resistant and Stenotrophomonas Maltophila)  - ID following, will start on Ceftazidime  - Rpt Blood cx tonight, please follow up  - Flagyl added by ID FU blood cx 's q 48 until clear     # Hypernatremia /#Hypokalemia resolved    # Hypomagnesemia  - Mg 1.3, s/p Mgsulfate 2gm x 2  - f/u with repeat BMP this evening    # Hx of DVT (on Xarelto at home)  - CTA negative for PE  - c/w Lovenox for now.     # ARTEMIO 2/2 ATN   - s/p CRRT, and Bumex and intermittent HD.   - Last HD 12/17, Bumex gtt d/c'ed. Now with good UOP   - c/w Rivera for now, will consider TOV in the near future  - Strict I’s and O’s    # Diet  - 16F g PEG tube placed on 1/11 by PEMA VIVEROS d'shagufta  - ID following - start with Flagyl, and one dose of Vancomycin on 1/12  - c/w bowel regimen  - c/w NPH q6hr, and ISS - Monitor FS routinely  - FS accucheck <100 past 24 h -Stop NPH and monitor - still controlled off NPH for now   - RISS     #HTN   - c/w Amlodipine /Metoprolol   - Monitor BP with parameters     #Pressure Ulcer  - Continue Wound recs for now   - Plastics consulted - rec Santyl to cheek/chin area bid and will follow     #Dispo  - RCU  - PT/OT - PT/OT reconsulted and PT on program   - d/c most likely to vent rehab facility

## 2021-01-13 NOTE — PROGRESS NOTE ADULT - ATTENDING COMMENTS
Agree with plan as outlined above. Patient seen and examined at bedside. Patient history, laboratory data, and imaging personally reviewed.    Pt is a 60F with PMHX as above presenting to [facility] for hypoxemic respiratory failure with ARDS 2/2 COVID19 PNA further c/b superimposed MSSA cavitary PNA and ARF requiring HD. Pt with failure to wean from mechanical ventilation, s/p tracheostomy placement 12/18 and transfer to RCU 12/28.     Pt now with improving encephalopathy, able to communicate and answer basic questions/follow commands. Remains off all IV sedation since 12/24. PO sedation held following trach exchange. CT Head (-) 12/12.  EEG (-). No indication for further neurologic w/u at this time given clinical improvement.     Pt with trach placement (CTSx, 12/18 Proximal 7XLT and then changed to 8Fr Bivona 12/31 for persistent air leak), now with resolution of air leak and significant improvement in ventilator synchrony. Daily SBTs as tolerated. Airway clearance therapy and trach care as per RCU team. Pt with bleeding from trach site last week, now resolved. Lovenox restarted without complication. Will discuss with thoracic sx for suture removal.     Pt with hx MSSA bacteremia with cavitary PNA s/p completion of Abx with Primaxin on 12/28 followed by course of Ceftazidime through 1/5 for Stenotrophomonas PNA. Overnight, pt with recurrence of fevers and (+) sputum cx for Stenotrophomonas and Pseudomonas. Ceftazidime restarted.    Pt with oropharyngeal dysphagia, IR guided PEG successfully placed 1/11. Pt also initially with ARF on CRRT, following by intermittent HD (last session 12/17) with renal recovery. NPH+HISS for glucose control. Wound consult and plastic sx recs appreciated.    Dispo pending medical optimization. Discussed with pt's  (Scotty) and son (Eric, ER physician) at length. PT reconsult given clinical improvement and severe deconditioning pending.

## 2021-01-13 NOTE — PROGRESS NOTE ADULT - ASSESSMENT
59 yo woman with AARON, h/o Lockett's procedure, s/p reversal 2011 who was admitted to [facility] 11/17 with covid pneumonia.  She received course of remdesevir and dexamethasone; required intubation 11/23 for acute hypoxemic respiratory failure. Hospital course complicated by Proteus bacteremia, MSSA bacteremia/ pneumonia, and Stenotrophomonas respiratory infection, as well as ATN requiring CVVHD --> HD ---> off dialysis.  CT chest 12/4 new large consolidation with cavitation in right lung.  Blood culture 11/27 MSSA and proteus; 11/28 MSSA.     Blood cultures 12/1, 12/11, 12/21, 12/27 no growth.  Sputum culture 12/1, 12/12, 12/27, 12/29, 1/9 Stenotrophomonas.    Multifocal covid pneumonia with respiratory failure, MSSA bacteremia 11/27 with cavitary pneumonia, Proteus bacteremia 11/27, Stenotrophomonas pneumonia.  s/p prolonged antibiotics for bacteremia, pneumonia, aspiration.  s/p trach 12/18.  12/29 Fever, blood tinged sputum and Stenotrophomonas in sputum.  meropenem changed to ceftazidime  --> 1/5 12/31 trach leak.  s/p OR 12/31 for trach change with CT surgery.     1/11 IR gastrostomy placement   1/12 fever pseudomonas and Stenotrophomonas in sputum.  ceftazidime started     Fever post gastrostomy procedure 1/11  afebrile   improved      suggest:  follow blood cultures  c/w with ceftazidime to cover pseudomonas and Stenotrophomonas  c/w flagyl 500 mg iv q 8 h  if blood cultures negative short duration antibiotics      Mila Burgos MD  Pager: [phone]  After 5 PM or weekends please call fellow on call or office [phone]

## 2021-01-14 LAB
ANION GAP SERPL CALC-SCNC: 9 MMOL/L — SIGNIFICANT CHANGE UP (ref 7–14)
BUN SERPL-MCNC: 8 MG/DL — SIGNIFICANT CHANGE UP (ref 7–23)
CALCIUM SERPL-MCNC: 8.5 MG/DL — SIGNIFICANT CHANGE UP (ref 8.4–10.5)
CHLORIDE SERPL-SCNC: 104 MMOL/L — SIGNIFICANT CHANGE UP (ref 98–107)
CO2 SERPL-SCNC: 29 MMOL/L — SIGNIFICANT CHANGE UP (ref 22–31)
CREAT SERPL-MCNC: 0.32 MG/DL — LOW (ref 0.5–1.3)
GLUCOSE BLDC GLUCOMTR-MCNC: 116 MG/DL — HIGH (ref 70–99)
GLUCOSE BLDC GLUCOMTR-MCNC: 122 MG/DL — HIGH (ref 70–99)
GLUCOSE BLDC GLUCOMTR-MCNC: 122 MG/DL — HIGH (ref 70–99)
GLUCOSE BLDC GLUCOMTR-MCNC: 131 MG/DL — HIGH (ref 70–99)
GLUCOSE BLDC GLUCOMTR-MCNC: 133 MG/DL — HIGH (ref 70–99)
GLUCOSE SERPL-MCNC: 121 MG/DL — HIGH (ref 70–99)
HCT VFR BLD CALC: 31.1 % — LOW (ref 34.5–45)
HGB BLD-MCNC: 8.8 G/DL — LOW (ref 11.5–15.5)
MAGNESIUM SERPL-MCNC: 1.6 MG/DL — SIGNIFICANT CHANGE UP (ref 1.6–2.6)
MCHC RBC-ENTMCNC: 28.3 GM/DL — LOW (ref 32–36)
MCHC RBC-ENTMCNC: 28.5 PG — SIGNIFICANT CHANGE UP (ref 27–34)
MCV RBC AUTO: 100.6 FL — HIGH (ref 80–100)
NRBC # BLD: 0 /100 WBCS — SIGNIFICANT CHANGE UP
NRBC # FLD: 0 K/UL — SIGNIFICANT CHANGE UP
PHOSPHATE SERPL-MCNC: 2.7 MG/DL — SIGNIFICANT CHANGE UP (ref 2.5–4.5)
PLATELET # BLD AUTO: 379 K/UL — SIGNIFICANT CHANGE UP (ref 150–400)
POTASSIUM SERPL-MCNC: 4.1 MMOL/L — SIGNIFICANT CHANGE UP (ref 3.5–5.3)
POTASSIUM SERPL-SCNC: 4.1 MMOL/L — SIGNIFICANT CHANGE UP (ref 3.5–5.3)
RBC # BLD: 3.09 M/UL — LOW (ref 3.8–5.2)
RBC # FLD: 18.3 % — HIGH (ref 10.3–14.5)
SODIUM SERPL-SCNC: 142 MMOL/L — SIGNIFICANT CHANGE UP (ref 135–145)
WBC # BLD: 10.23 K/UL — SIGNIFICANT CHANGE UP (ref 3.8–10.5)
WBC # FLD AUTO: 10.23 K/UL — SIGNIFICANT CHANGE UP (ref 3.8–10.5)

## 2021-01-14 PROCEDURE — 99233 SBSQ HOSP IP/OBS HIGH 50: CPT

## 2021-01-14 RX ADMIN — CEFTAZIDIME 100 GRAM(S): 6 INJECTION, POWDER, FOR SOLUTION INTRAVENOUS at 22:46

## 2021-01-14 RX ADMIN — Medication 1 PACKET(S): at 05:47

## 2021-01-14 RX ADMIN — Medication 1 PACKET(S): at 17:50

## 2021-01-14 RX ADMIN — CHLORHEXIDINE GLUCONATE 1 APPLICATION(S): 213 SOLUTION TOPICAL at 14:03

## 2021-01-14 RX ADMIN — ENOXAPARIN SODIUM 100 MILLIGRAM(S): 100 INJECTION SUBCUTANEOUS at 05:47

## 2021-01-14 RX ADMIN — Medication 1 APPLICATION(S): at 17:50

## 2021-01-14 RX ADMIN — Medication 50 MILLIGRAM(S): at 05:47

## 2021-01-14 RX ADMIN — Medication 1 APPLICATION(S): at 05:48

## 2021-01-14 RX ADMIN — Medication 50 MILLIGRAM(S): at 17:50

## 2021-01-14 RX ADMIN — Medication 1 TABLET(S): at 17:49

## 2021-01-14 RX ADMIN — Medication 100 MILLIGRAM(S): at 05:48

## 2021-01-14 RX ADMIN — Medication 1 TABLET(S): at 05:47

## 2021-01-14 RX ADMIN — PANTOPRAZOLE SODIUM 40 MILLIGRAM(S): 20 TABLET, DELAYED RELEASE ORAL at 14:05

## 2021-01-14 RX ADMIN — Medication 100 MILLIGRAM(S): at 14:03

## 2021-01-14 RX ADMIN — Medication 100 MILLIGRAM(S): at 22:04

## 2021-01-14 RX ADMIN — ENOXAPARIN SODIUM 100 MILLIGRAM(S): 100 INJECTION SUBCUTANEOUS at 17:48

## 2021-01-14 RX ADMIN — AMLODIPINE BESYLATE 5 MILLIGRAM(S): 2.5 TABLET ORAL at 05:47

## 2021-01-14 RX ADMIN — CEFTAZIDIME 100 GRAM(S): 6 INJECTION, POWDER, FOR SOLUTION INTRAVENOUS at 14:03

## 2021-01-14 RX ADMIN — CEFTAZIDIME 100 GRAM(S): 6 INJECTION, POWDER, FOR SOLUTION INTRAVENOUS at 05:48

## 2021-01-14 NOTE — PROGRESS NOTE ADULT - ASSESSMENT
This is a 60 year old Female with a PMH of HTN, DVT on Xarelto, peritonitis s/p Oral's procedure and ileostomy (reversed 2011), AARON who presented to [facility] on 11/18  w/ fevers found to have COVID-19, intubated 11/23, course complicated by superimposed PNA and bacteremia with Stenotrophomonas (12/11, completed Levaquin x7 days), MSSA/P. mirablis bacteremia (on Cefazolin, potentially due to urine vs line-associated and MSSA in sputum due to PNA), also with lung abscesses on CT chest on 12/5 , ARTEMIO/ATN requiring CRRT and HD. Now unable to wean from ventilator and transferred from [facility] on 12/10 to offload COVID unit. Unable to wean from ventilator, s/p tracheostmy with ctsx 12/18 planned for PEG 12/24 but unable to perform due to large ventral hernia     # AMS  - Alert & oriented x3 @baseline.    - s/p wean off Methadone and Valium 5mg TID  - Spot EEG - abnormal study- Moderate nonspecific diffuse or multifocal cerebral dysfunction   - No purposeful movements appreciated.  - PT/OT seeing pt when more alert/following commands   - Following commands     # Acute hypoxemic respiratory failure 2/2 covid-19 PNA / MSSA Bacteremia/Pneumonia,  - s/p intubated 11/23. Course complicated by MSSA pneumonia, stenotrophomonas, p. mirabalis, and lung abscess on CT chest from 12/5.   - Pt has been unable to wean from ventilator, s/p Trached by CTSx on 12/18..   - s/p Imipenem (12/16- 12/28) 500mg q12h x 2 weeks for cavitation and MSSA,  - s/p Ceftazidime total of 7 days (12/29 - 1/5) (due to resistance to Levaquin)  - s/p Trach exchange to 8Fr Bivona w Cannula at 13.5 cm by CTSx on 12/31 due to previous large air leaks  1/12  - Sputum cx sent on 1/9 showing +P.aeruginosa (Carbapenem resistant and Stenotrophomonas Maltophila)  - ID following, will start on Ceftazidime  - Rpt Blood cx tonight, please follow up  - Flagyl added by ID FU blood cx 's q 48 until clear     # Hypernatremia /#Hypokalemia resolved    # Hypomagnesemia  - Mg 1.3, s/p Mgsulfate 2gm x 2  - f/u with repeat BMP this evening    # Hx of DVT (on Xarelto at home)  - CTA negative for PE  - c/w Lovenox for now.     # ARTEMIO 2/2 ATN   - s/p CRRT, and Bumex and intermittent HD.   - Last HD 12/17, Bumex gtt d/c'ed. Now with good UOP   - c/w Rivera for now, will consider TOV in the near future  - Strict I’s and O’s    # Diet  - 16F g PEG tube placed on 1/11 by PEMA VIVEROS d'shagufta  - ID following - start with Flagyl, and one dose of Vancomycin on 1/12  - c/w bowel regimen  - c/w NPH q6hr, and ISS - Monitor FS routinely  - FS accucheck <100 past 24 h -Stop NPH and monitor - still controlled off NPH for now   - RISS     #HTN   - c/w Amlodipine /Metoprolol   - Monitor BP with parameters     #Pressure Ulcer  - Continue Wound recs for now   - Plastics consulted - rec Santyl to cheek/chin area bid and will follow     #Dispo  - RCU  - PT/OT - PT/OT reconsulted and PT on program   - d/c most likely to vent rehab facility    This is a 60 year old Female with a PMH of HTN, DVT on Xarelto, peritonitis s/p Oral's procedure and ileostomy (reversed 2011), AARON who presented to [facility] on 11/18  w/ fevers found to have COVID-19, intubated 11/23, course complicated by superimposed PNA and bacteremia with Stenotrophomonas (12/11, completed Levaquin x7 days), MSSA/P. mirablis bacteremia (on Cefazolin, potentially due to urine vs line-associated and MSSA in sputum due to PNA), also with lung abscesses on CT chest on 12/5 , ARTEMIO/ATN requiring CRRT and HD. Now unable to wean from ventilator and transferred from [facility] on 12/10 to offload COVID unit. Unable to wean from ventilator, s/p tracheostmy with ctsx 12/18 planned for PEG 12/24 but unable to perform due to large ventral hernia     # AMS  - Alert & oriented x3 @baseline.    - s/p wean off Methadone and Valium 5mg TID  - Spot EEG - abnormal study- Moderate nonspecific diffuse or multifocal cerebral dysfunction   - No purposeful movements appreciated.  - PT/OT seeing pt when more alert/following commands   - Following commands - weak but moving extremities     # Acute hypoxemic respiratory failure 2/2 covid-19 PNA / MSSA Bacteremia/Pneumonia,  - s/p intubated 11/23. Course complicated by MSSA pneumonia, stenotrophomonas, p. mirabalis, and lung abscess on CT chest from 12/5.   - Pt has been unable to wean from ventilator, s/p Trached by CTSx on 12/18..   - s/p Imipenem (12/16- 12/28) 500mg q12h x 2 weeks for cavitation and MSSA,  - s/p Ceftazidime total of 7 days (12/29 - 1/5) (due to resistance to Levaquin)  - s/p Trach exchange to 8Fr Bivona w Cannula at 13.5 cm by CTSx on 12/31 due to previous large air leaks  1/12  - Sputum cx sent on 1/9 showing +P.aeruginosa (Carbapenem resistant and Stenotrophomonas Maltophila)  - ID following, will start on Ceftazidime  - Rpt Blood cx tonight, please follow up  - Flagyl added by ID FU blood cx 's q 48 until clear     # Hypernatremia /#Hypokalemia resolved    # Hypomagnesemia  - Mg 1.3, s/p Mgsulfate 2gm x 2  - f/u with repeat BMP this evening  - Mg wnl     # Hx of DVT (on Xarelto at home)  - CTA negative for PE  - c/w Lovenox for now.     # ARTEMIO 2/2 ATN   - s/p CRRT, and Bumex and intermittent HD.   - Last HD 12/17, Bumex gtt d/c'ed. Now with good UOP   - c/w Rivera for now, will consider TOV in the near future  - Strict I’s and O’s    # Diet  - 16F g PEG tube placed on 1/11 by IRCindy VIVEROS d'shagufta  - ID following - start with Flagyl, and one dose of Vancomycin on 1/12  - c/w bowel regimen  - c/w NPH q6hr, and ISS - Monitor FS routinely  - FS accucheck <100 past 24 h -Stop NPH and monitor - still controlled off NPH for now   - RISS     #HTN   - c/w Amlodipine /Metoprolol   - Monitor BP with parameters     #Pressure Ulcer  - Continue Wound recs for now   - Plastics consulted - rec Santyl to cheek/chin area bid and will follow     #Dispo  - RCU  - PT/OT - PT/OT reconsulted and PT on program   - d/c most likely to vent rehab facility

## 2021-01-14 NOTE — PROGRESS NOTE ADULT - ATTENDING COMMENTS
Agree with plan as outlined above. Patient seen and examined at bedside. Patient history, laboratory data, and imaging personally reviewed.    Pt is a 60F with PMHX as above presenting to [facility] for hypoxemic respiratory failure with ARDS 2/2 COVID19 PNA further c/b superimposed MSSA cavitary PNA and ARF requiring HD. Pt with failure to wean from mechanical ventilation, s/p tracheostomy placement 12/18 and transfer to RCU 12/28.     Pt now with improving encephalopathy, able to communicate and answer basic questions/follow commands. Remains off all IV sedation since 12/24. PO sedation held following trach exchange. CT Head (-) 12/12.  EEG (-). No indication for further neurologic w/u at this time given clinical improvement.     Pt with trach placement (CTSx, 12/18 Proximal 7XLT and then changed to 8Fr Bivona 12/31 for persistent air leak), now with resolution of air leak and significant improvement in ventilator synchrony. Daily SBTs as tolerated. Airway clearance therapy and trach care as per RCU team. Will discuss with thoracic sx for suture removal. Pt tolerating SBT 15/5, 40% today.    Pt with hx MSSA bacteremia with cavitary PNA s/p completion of Abx with Primaxin on 12/28 followed by course of Ceftazidime through 1/5 for Stenotrophomonas PNA. Pt with recurrence of fevers and (+) sputum cx for Stenotrophomonas and Pseudomonas 1/12, now on Flagyl+Ceftazidime+Vancomycin with clinical improvement. Pt now afebrile with improvement in respiratory secretions. Blood cx (-), plan for 5-7 day course Abx followed by repeat CT Chest for evaluation of prior cavitary PNA.     Pt with oropharyngeal dysphagia, IR guided PEG successfully placed 1/11. Pt tolerating PEG feeds. Pt also initially with ARF on CRRT, following by intermittent HD (last session 12/17) with renal recovery. NPH+HISS for glucose control. Wound consult and plastic sx recs appreciated.    Dispo pending medical optimization. PT reconsult appreciated.

## 2021-01-15 LAB
GLUCOSE BLDC GLUCOMTR-MCNC: 117 MG/DL — HIGH (ref 70–99)
GLUCOSE BLDC GLUCOMTR-MCNC: 126 MG/DL — HIGH (ref 70–99)
GLUCOSE BLDC GLUCOMTR-MCNC: 133 MG/DL — HIGH (ref 70–99)
GLUCOSE BLDC GLUCOMTR-MCNC: 146 MG/DL — HIGH (ref 70–99)

## 2021-01-15 PROCEDURE — 99232 SBSQ HOSP IP/OBS MODERATE 35: CPT

## 2021-01-15 PROCEDURE — 99233 SBSQ HOSP IP/OBS HIGH 50: CPT

## 2021-01-15 RX ORDER — PANTOPRAZOLE SODIUM 20 MG/1
40 TABLET, DELAYED RELEASE ORAL DAILY
Refills: 0 | Status: DISCONTINUED | OUTPATIENT
Start: 2021-01-15 | End: 2021-01-17

## 2021-01-15 RX ADMIN — CEFTAZIDIME 100 GRAM(S): 6 INJECTION, POWDER, FOR SOLUTION INTRAVENOUS at 22:59

## 2021-01-15 RX ADMIN — Medication 1 PACKET(S): at 17:09

## 2021-01-15 RX ADMIN — Medication 100 MILLIGRAM(S): at 06:11

## 2021-01-15 RX ADMIN — CEFTAZIDIME 100 GRAM(S): 6 INJECTION, POWDER, FOR SOLUTION INTRAVENOUS at 06:11

## 2021-01-15 RX ADMIN — PANTOPRAZOLE SODIUM 40 MILLIGRAM(S): 20 TABLET, DELAYED RELEASE ORAL at 12:22

## 2021-01-15 RX ADMIN — ENOXAPARIN SODIUM 100 MILLIGRAM(S): 100 INJECTION SUBCUTANEOUS at 17:08

## 2021-01-15 RX ADMIN — Medication 100 MILLIGRAM(S): at 12:22

## 2021-01-15 RX ADMIN — AMLODIPINE BESYLATE 5 MILLIGRAM(S): 2.5 TABLET ORAL at 06:10

## 2021-01-15 RX ADMIN — Medication 1 PACKET(S): at 06:10

## 2021-01-15 RX ADMIN — Medication 100 MILLIGRAM(S): at 22:59

## 2021-01-15 RX ADMIN — Medication 50 MILLIGRAM(S): at 06:10

## 2021-01-15 RX ADMIN — CHLORHEXIDINE GLUCONATE 1 APPLICATION(S): 213 SOLUTION TOPICAL at 13:44

## 2021-01-15 RX ADMIN — Medication 50 MILLIGRAM(S): at 17:10

## 2021-01-15 RX ADMIN — ENOXAPARIN SODIUM 100 MILLIGRAM(S): 100 INJECTION SUBCUTANEOUS at 06:10

## 2021-01-15 RX ADMIN — Medication 1 APPLICATION(S): at 06:11

## 2021-01-15 RX ADMIN — Medication 1 TABLET(S): at 17:09

## 2021-01-15 RX ADMIN — Medication 1 TABLET(S): at 06:10

## 2021-01-15 RX ADMIN — Medication 1 APPLICATION(S): at 17:09

## 2021-01-15 RX ADMIN — CEFTAZIDIME 100 GRAM(S): 6 INJECTION, POWDER, FOR SOLUTION INTRAVENOUS at 15:28

## 2021-01-15 NOTE — PROGRESS NOTE ADULT - ASSESSMENT
61 yo woman with AARON, h/o Lockett's procedure, s/p reversal 2011 who was admitted to [facility] 11/17 with covid pneumonia.  She received course of remdesevir and dexamethasone; required intubation 11/23 for acute hypoxemic respiratory failure. Hospital course complicated by Proteus bacteremia, MSSA bacteremia/ pneumonia, and Stenotrophomonas respiratory infection, as well as ATN requiring CVVHD --> HD ---> off dialysis.  CT chest 12/4 new large consolidation with cavitation in right lung.  Blood culture 11/27 MSSA and proteus; 11/28 MSSA.     Blood cultures 12/1, 12/11, 12/21, 12/27 no growth.  Sputum culture 12/1, 12/12, 12/27, 12/29, 1/9 Stenotrophomonas.    Multifocal covid pneumonia with respiratory failure, MSSA bacteremia 11/27 with cavitary pneumonia, Proteus bacteremia 11/27, Stenotrophomonas pneumonia.  s/p prolonged antibiotics for bacteremia, pneumonia, aspiration.  s/p trach 12/18.  12/29 Fever, blood tinged sputum and Stenotrophomonas in sputum.  meropenem changed to ceftazidime  --> 1/5 12/31 trach leak.  s/p OR 12/31 for trach change with CT surgery.     1/11 IR gastrostomy placement   1/12 fever pseudomonas and Stenotrophomonas in sputum.  ceftazidime started     Fever post gastrostomy procedure 1/11  afebrile   improved      suggest:  follow final blood culture results   c/w with ceftazidime to cover pseudomonas and Stenotrophomonas  c/w flagyl 500 mg iv q 8 h  if blood cultures negative duration antibiotics  thru 1/16.       Mila Burgos MD  Pager: [phone]  After 5 PM or weekends please call fellow on call or office [phone]

## 2021-01-15 NOTE — PROGRESS NOTE ADULT - ATTENDING COMMENTS
Agree with plan as outlined above. Patient seen and examined at bedside. Patient history, laboratory data, and imaging personally reviewed.    Pt is a 60F with PMHX as above presenting to [facility] for hypoxemic respiratory failure with ARDS 2/2 COVID19 PNA further c/b superimposed MSSA cavitary PNA and ARF requiring HD. Pt with failure to wean from mechanical ventilation, s/p tracheostomy placement 12/18 and transfer to RCU 12/28.     Pt now with improving encephalopathy, able to communicate and answer basic questions/follow commands. Remains off all IV sedation since 12/24. PO sedation held following trach exchange. CT Head (-) 12/12.  EEG (-). No indication for further neurologic w/u at this time given clinical improvement. OOB to chair today. Moves all 4 extremities independently, but remains physically severely deconditioned.     Pt with trach placement (CTSx, 12/18 Proximal 7XLT and then changed to 8Fr Bivona 12/31 for persistent air leak), now with resolution of air leak and significant improvement in ventilator synchrony. Daily SBTs as tolerated. Pt tolerated 15/5, 40% for a few hours today, but then had 2 episodes of emesis with airway aspiration. Will c/w airway clearance therapy and trach care as per RCU team. O2 saturations remain normal and pt did not exhibit new s/s respiratory distress. Will CTM carefully. Thoracic sx to remove sutures.     Pt with hx MSSA bacteremia with cavitary PNA s/p completion of Abx with Primaxin on 12/28 followed by course of Ceftazidime through 1/5 for Stenotrophomonas PNA. Pt with recurrence of fevers and (+) sputum cx for Stenotrophomonas and Pseudomonas 1/12, now on Flagyl+Ceftazidime+Vancomycin with clinical improvement. Pt now afebrile with improvement in respiratory secretions. Blood cx (-), plan for 5-7 day course Abx followed by repeat CT Chest for evaluation of prior cavitary PNA.     Pt with oropharyngeal dysphagia, IR guided PEG successfully placed 1/11. Pt tolerating PEG feeds. Pt also initially with ARF on CRRT, following by intermittent HD (last session 12/17) with renal recovery. NPH+HISS for glucose control. Wound consult and plastic sx recs appreciated.    Dispo pending medical optimization. PT reconsult appreciated.

## 2021-01-15 NOTE — PROGRESS NOTE ADULT - ASSESSMENT
This is a 60 year old Female with a PMH of HTN, DVT on Xarelto, peritonitis s/p Oral's procedure and ileostomy (reversed 2011), AARON who presented to [facility] on 11/18  w/ fevers found to have COVID-19, intubated 11/23, course complicated by superimposed PNA and bacteremia with Stenotrophomonas (12/11, completed Levaquin x7 days), MSSA/P. mirablis bacteremia (on Cefazolin, potentially due to urine vs line-associated and MSSA in sputum due to PNA), also with lung abscesses on CT chest on 12/5 , ARTEMIO/ATN requiring CRRT and HD. Now unable to wean from ventilator and transferred from [facility] on 12/10 to offload COVID unit. Unable to wean from ventilator, s/p tracheostmy with ctsx 12/18 planned for PEG 12/24 but unable to perform due to large ventral hernia     # AMS  - Alert & oriented x3 @baseline.    - s/p wean off Methadone and Valium 5mg TID  - Spot EEG - abnormal study- Moderate nonspecific diffuse or multifocal cerebral dysfunction   - No purposeful movements appreciated.  - PT/OT seeing pt when more alert/following commands   - Following commands - weak but moving extremities     # Acute hypoxemic respiratory failure 2/2 covid-19 PNA / MSSA Bacteremia/Pneumonia,  - s/p intubated 11/23. Course complicated by MSSA pneumonia, stenotrophomonas, p. mirabalis, and lung abscess on CT chest from 12/5.   - Pt has been unable to wean from ventilator, s/p Trached by CTSx on 12/18..   - s/p Imipenem (12/16- 12/28) 500mg q12h x 2 weeks for cavitation and MSSA,  - s/p Ceftazidime total of 7 days (12/29 - 1/5) (due to resistance to Levaquin)  - s/p Trach exchange to 8Fr Bivona w Cannula at 13.5 cm by CTSx on 12/31 due to previous large air leaks  1/12  - Sputum cx sent on 1/9 showing +P.aeruginosa (Carbapenem resistant and Stenotrophomonas Maltophila)  - ID following, will start on Ceftazidime  - Rpt Blood cx tonight, please follow up  - Flagyl added by ID FU blood cx 's q 48 until clear     # Hypernatremia /#Hypokalemia resolved    # Hypomagnesemia  - Mg 1.3, s/p Mgsulfate 2gm x 2  - f/u with repeat BMP this evening  - Mg wnl     # Hx of DVT (on Xarelto at home)  - CTA negative for PE  - c/w Lovenox for now.     # ARTEMIO 2/2 ATN   - s/p CRRT, and Bumex and intermittent HD.   - Last HD 12/17, Bumex gtt d/c'ed. Now with good UOP   - c/w Rivera for now, will consider TOV in the near future  - Strict I’s and O’s    # Diet  - 16F g PEG tube placed on 1/11 by IRCindy VIVEROS d'shagufta  - ID following - start with Flagyl, and one dose of Vancomycin on 1/12  - c/w bowel regimen  - c/w NPH q6hr, and ISS - Monitor FS routinely  - FS accucheck <100 past 24 h -Stop NPH and monitor - still controlled off NPH for now   - RISS     #HTN   - c/w Amlodipine /Metoprolol   - Monitor BP with parameters     #Pressure Ulcer  - Continue Wound recs for now   - Plastics consulted - rec Santyl to cheek/chin area bid and will follow     #Dispo  - RCU  - PT/OT - PT/OT reconsulted and PT on program   - d/c most likely to vent rehab facility    This is a 60 year old Female with a PMH of HTN, DVT on Xarelto, peritonitis s/p Oral's procedure and ileostomy (reversed 2011), AARON who presented to [facility] on 11/18  w/ fevers found to have COVID-19, intubated 11/23, course complicated by superimposed PNA and bacteremia with Stenotrophomonas (12/11, completed Levaquin x7 days), MSSA/P. mirablis bacteremia (on Cefazolin, potentially due to urine vs line-associated and MSSA in sputum due to PNA), also with lung abscesses on CT chest on 12/5 , ARTEMIO/ATN requiring CRRT and HD. Now unable to wean from ventilator and transferred from [facility] on 12/10 to offload COVID unit. Unable to wean from ventilator, s/p tracheostmy with ctsx 12/18 planned for PEG 12/24 but unable to perform due to large ventral hernia     # AMS  - Alert & oriented x3 @baseline.    - s/p wean off Methadone and Valium 5mg TID  - Spot EEG - abnormal study- Moderate nonspecific diffuse or multifocal cerebral dysfunction   - No purposeful movements appreciated.  - PT/OT seeing pt when more alert/following commands   - Following commands - weak but moving extremities     # Acute hypoxemic respiratory failure 2/2 covid-19 PNA / MSSA Bacteremia/Pneumonia,  - s/p intubated 11/23. Course complicated by MSSA pneumonia, stenotrophomonas, p. mirabalis, and lung abscess on CT chest from 12/5.   - Pt has been unable to wean from ventilator, s/p Trached by CTSx on 12/18..   - s/p Imipenem (12/16- 12/28) 500mg q12h x 2 weeks for cavitation and MSSA,  - s/p Ceftazidime total of 7 days (12/29 - 1/5) (due to resistance to Levaquin)  - s/p Trach exchange to 8Fr Bivona w Cannula at 13.5 cm by CTSx on 12/31 due to previous large air leaks  1/12  - Sputum cx sent on 1/9 showing +P.aeruginosa (Carbapenem resistant and Stenotrophomonas Maltophila)  - ID following, will start on Ceftazidime  - Rpt Blood cx tonight, please follow up  - Flagyl added by ID FU blood cx 's q 48 until clear   - Tolerating trach collar     # Hypernatremia /#Hypokalemia resolved    # Hypomagnesemia  - Mg 1.3, s/p Mgsulfate 2gm x 2  - f/u with repeat BMP this evening  - Mg wnl     # Hx of DVT (on Xarelto at home)  - CTA negative for PE  - c/w Lovenox for now.     # ARTEMIO 2/2 ATN   - s/p CRRT, and Bumex and intermittent HD.   - Last HD 12/17, Bumex gtt d/c'ed. Now with good UOP   - c/w Rivera for now, will consider TOV in the near future  - Strict I’s and O’s    # Diet  - 16F g PEG tube placed on 1/11 by PEMA singh'shagufta  - ID following - start with Flagyl, and one dose of Vancomycin on 1/12  - c/w bowel regimen  - c/w NPH q6hr, and ISS - Monitor FS routinely  - FS accucheck <100 past 24 h -Stop NPH and monitor - still controlled off NPH for now   - RISS  - Vomited x 1 while oob, TF restarted and monitoring      #HTN   - c/w Amlodipine /Metoprolol   - Monitor BP with parameters     #Pressure Ulcer  - Continue Wound recs for now   - Plastics consulted - rec Santyl to cheek/chin area bid and will follow     #Dispo  - RCU  - PT/OT - PT/OT reconsulted and PT on program   - d/c most likely to vent rehab facility

## 2021-01-15 NOTE — PROGRESS NOTE ADULT - SUBJECTIVE AND OBJECTIVE BOX
CHIEF COMPLAINT:    Interval Events:    REVIEW OF SYSTEMS:  Constitutional:   Eyes:  ENT:  CV:  Resp:  GI:  :  MSK:  Integumentary:  Neurological:  Psychiatric:  Endocrine:  Hematologic/Lymphatic:  Allergic/Immunologic:  [ ] All other systems negative  [ ] Unable to assess ROS because ________    OBJECTIVE:  ICU Vital Signs Last 24 Hrs  T(C): 36.9 (15 Camilo 2021 06:15), Max: 37.1 (14 Jan 2021 14:00)  T(F): 98.5 (15 Camilo 2021 06:15), Max: 98.7 (14 Jan 2021 14:00)  HR: 105 (15 Camilo 2021 06:15) (83 - 105)  BP: 164/82 (15 Camilo 2021 06:15) (146/61 - 164/82)  BP(mean): --  ABP: --  ABP(mean): --  RR: 20 (15 Camilo 2021 06:15) (18 - 20)  SpO2: 100% (15 Camilo 2021 06:15) (99% - 100%)    Mode: AC/ CMV (Assist Control/ Continuous Mandatory Ventilation), RR (machine): 12, TV (machine): 450, FiO2: 40, PEEP: 5, MAP: 15.6, PIP: 32    01-14 @ 07:01  -  01-15 @ 07:00  --------------------------------------------------------  IN: 1240 mL / OUT: 800 mL / NET: 440 mL      CAPILLARY BLOOD GLUCOSE      POCT Blood Glucose.: 126 mg/dL (15 Camilo 2021 06:11)      PHYSICAL EXAM:  General:   HEENT:   Lymph Nodes:  Neck:   Respiratory:   Cardiovascular:   Abdomen:   Extremities:   Skin:   Neurological:  Psychiatry:    HOSPITAL MEDICATIONS:  MEDICATIONS  (STANDING):  amLODIPine   Tablet 5 milliGRAM(s) Oral daily  cefTAZidime  IVPB 2 Gram(s) IV Intermittent every 8 hours  chlorhexidine 4% Liquid 1 Application(s) Topical daily  collagenase Ointment 1 Application(s) Topical two times a day  enoxaparin Injectable 100 milliGRAM(s) SubCutaneous every 12 hours  insulin lispro (ADMELOG) corrective regimen sliding scale   SubCutaneous every 6 hours  lactobacillus acidophilus 1 Tablet(s) Oral two times a day  metoprolol tartrate 50 milliGRAM(s) Oral two times a day  metroNIDAZOLE  IVPB      metroNIDAZOLE  IVPB 500 milliGRAM(s) IV Intermittent every 8 hours  pantoprazole  Injectable 40 milliGRAM(s) IV Push daily  psyllium Powder 1 Packet(s) Oral two times a day    MEDICATIONS  (PRN):  acetaminophen    Suspension .. 650 milliGRAM(s) Oral every 6 hours PRN Temp greater or equal to 38C (100.4F)      LABS:                        8.8    10.23 )-----------( 379      ( 14 Jan 2021 07:41 )             31.1     01-14    142  |  104  |  8   ----------------------------<  121<H>  4.1   |  29  |  0.32<L>    Ca    8.5      14 Jan 2021 07:41  Phos  2.7     01-14  Mg     1.6     01-14                MICROBIOLOGY:     RADIOLOGY:  [ ] Reviewed and interpreted by me    PULMONARY FUNCTION TESTS:    EKG: CHIEF COMPLAINT: Patient is a 60y old  Female who presents with a chief complaint of Transfer from [facility] (15 Camilo 2021 17:13)      Interval Events: Pt seen and evaluated by team at bedside     REVIEW OF SYSTEMS:  Constitutional: Denies pain - asking to be oob   Eyes:  ENT: Denies   CV: Denies   Resp: Denies   GI: + nausea   [x ] All other systems negative      OBJECTIVE:  ICU Vital Signs Last 24 Hrs  T(C): 36.9 (15 Camilo 2021 06:15), Max: 37.1 (14 Jan 2021 14:00)  T(F): 98.5 (15 Camilo 2021 06:15), Max: 98.7 (14 Jan 2021 14:00)  HR: 105 (15 Camilo 2021 06:15) (83 - 105)  BP: 164/82 (15 Camilo 2021 06:15) (146/61 - 164/82)  BP(mean): --  ABP: --  ABP(mean): --  RR: 20 (15 Camilo 2021 06:15) (18 - 20)  SpO2: 100% (15 Camilo 2021 06:15) (99% - 100%)    Mode: AC/ CMV (Assist Control/ Continuous Mandatory Ventilation), RR (machine): 12, TV (machine): 450, FiO2: 40, PEEP: 5, MAP: 15.6, PIP: 32    01-14 @ 07:01  -  01-15 @ 07:00  --------------------------------------------------------  IN: 1240 mL / OUT: 800 mL / NET: 440 mL      CAPILLARY BLOOD GLUCOSE      POCT Blood Glucose.: 126 mg/dL (15 Camilo 2021 06:11)        HOSPITAL MEDICATIONS:  MEDICATIONS  (STANDING):  amLODIPine   Tablet 5 milliGRAM(s) Oral daily  cefTAZidime  IVPB 2 Gram(s) IV Intermittent every 8 hours  chlorhexidine 4% Liquid 1 Application(s) Topical daily  collagenase Ointment 1 Application(s) Topical two times a day  enoxaparin Injectable 100 milliGRAM(s) SubCutaneous every 12 hours  insulin lispro (ADMELOG) corrective regimen sliding scale   SubCutaneous every 6 hours  lactobacillus acidophilus 1 Tablet(s) Oral two times a day  metoprolol tartrate 50 milliGRAM(s) Oral two times a day  metroNIDAZOLE  IVPB      metroNIDAZOLE  IVPB 500 milliGRAM(s) IV Intermittent every 8 hours  pantoprazole  Injectable 40 milliGRAM(s) IV Push daily  psyllium Powder 1 Packet(s) Oral two times a day    MEDICATIONS  (PRN):  acetaminophen    Suspension .. 650 milliGRAM(s) Oral every 6 hours PRN Temp greater or equal to 38C (100.4F)      LABS:                        8.8    10.23 )-----------( 379      ( 14 Jan 2021 07:41 )             31.1     01-14    142  |  104  |  8   ----------------------------<  121<H>  4.1   |  29  |  0.32<L>    Ca    8.5      14 Jan 2021 07:41  Phos  2.7     01-14  Mg     1.6     01-14                MICROBIOLOGY:     RADIOLOGY:  [ ] Reviewed and interpreted by me    PULMONARY FUNCTION TESTS:    EKG:

## 2021-01-15 NOTE — PROGRESS NOTE ADULT - SUBJECTIVE AND OBJECTIVE BOX
Follow Up:      Inverval History/ROS:Patient is a 60y old  Female who presents with a chief complaint of Transfer from [facility] (22 Dec 2020 08:13)     s/p IR gastrostomy tube placement 1/11.   febrile 1/12.  ceftazidime, flagyl, vanco started.  blood cultures drawn-  no growth.  afebrile.      OR for trach change with CT surgery 12/31.           Allergies    Kiwi (Unknown)  latex (Anaphylaxis)  latex (Unknown)  penicillin (Other)  penicillin (Unknown)  perfume  hives (Other)  potassium acetate (Other)  soap additives hives (Other)    Intolerances        ANTIMICROBIALS: cefTAZidime  IVPB 2 every 8 hours  metroNIDAZOLE  IVPB    metroNIDAZOLE  IVPB 500 every 8 hours        MEDICATIONS  (STANDING):  acetaminophen    Suspension .. 650 every 6 hours PRN  amLODIPine   Tablet 5 daily  enoxaparin Injectable 100 every 12 hours  insulin lispro (ADMELOG) corrective regimen sliding scale  every 6 hours  metoprolol tartrate 50 two times a day  metoprolol tartrate Injectable 5 every 6 hours  pantoprazole  Injectable 40 daily  psyllium Powder 1 two times a day    Vital Signs Last 24 Hrs  T(F): 98.7 (01-15-21 @ 09:00), Max: 98.7 (01-15-21 @ 09:00)  HR: 80 (01-15-21 @ 15:25)  BP: 164/82 (01-15-21 @ 06:15)  RR: 23 (01-15-21 @ 09:00)  SpO2: 100% (01-15-21 @ 15:25) (99% - 100%)    PHYSICAL EXAM:  General: [x ] trach, non toxic  ENT:  eschar chin, trach,  Cardiovascular:   distant  Respiratory:  clear ant  GI:  [x ] soft, scars, hernia, gastrostomy tube   :  [ ] negro   Musculoskeletal: weak  Neurologic:  unable   Skin:   no rash  Psychiatric:  unable                            8.8    10.23 )-----------( 379      ( 14 Jan 2021 07:41 )             31.1 01-14    142  |  104  |  8   ----------------------------<  121  4.1   |  29  |  0.32  Ca    8.5      14 Jan 2021 07:41Phos  2.7     01-14Mg     1.6     01-14            MICROBIOLOGY:    1/12 blood cultures x 2 no growth to date    cuCulture - Sputum . (01.09.21 @ 18:30)   - Amikacin: S <=16   - Aztreonam: I 16   - Cefepime: S 8   - Ceftazidime: S 4   - Ceftazidime: S 4   - Ciprofloxacin: S 0.5   - Gentamicin: S <=2   - Imipenem: R >8   - Levofloxacin: S 2   - Levofloxacin: S <=0.5   - Meropenem: R >8   - Piperacillin/Tazobactam: S 16   - Tobramycin: S <=2   - Trimethoprim/Sulfamethoxazole: S <=0.5/9.5   Gram Stain:   Few polymorphonuclear leukocytes per low power field   No Squamous epithelial cells per low power field   Numerous Gram Negative Rods seen per oil power field   Specimen Source: .Sputum Sputum   Culture Results:   Numerous Pseudomonas aeruginosa (Carbapenem Resistant)   Numerous Stenotrophomonas maltophilia   Normal Respiratory Jocelynn absent   Organism Identification: Pseudomonas aeruginosa (Carbapenem Resistant)   Stenotrophomonas maltophilia     RADIOLOGY:    rd< from: Xray Chest 1 View-PORTABLE IMMEDIATE (Xray Chest 1 View-PORTABLE IMMEDIATE .) (12.19.20 @ 20:25) >    rd< from: Xray Chest 1 View- PORTABLE-Urgent (Xray Chest 1 View- PORTABLE-Urgent .) (01.02.21 @ 22:42) >    EXAM:  XR CHEST PORTABLE URGENT 1V        PROCEDURE DATE:  Jan 2 2021         INTERPRETATION:  Chest one view    HISTORY: Feeding tube advancement    COMPARISON STUDY: Earlier the same day    Frontal expiratory view of the chest shows the heart dax similar in size. Tracheostomy tube is again noted. Feeding tube is been advanced into the stomach.    The lungs show similar patchy infiltrates, right larger than left. and there is no evidence of pneumothorax nor pleural effusion.    IMPRESSION:  Feeding tube to stomach.    Thank you for the courtesy of this referral.    < end of copied text >

## 2021-01-16 LAB
ANION GAP SERPL CALC-SCNC: 13 MMOL/L — SIGNIFICANT CHANGE UP (ref 7–14)
BUN SERPL-MCNC: 9 MG/DL — SIGNIFICANT CHANGE UP (ref 7–23)
CALCIUM SERPL-MCNC: 8.8 MG/DL — SIGNIFICANT CHANGE UP (ref 8.4–10.5)
CHLORIDE SERPL-SCNC: 101 MMOL/L — SIGNIFICANT CHANGE UP (ref 98–107)
CO2 SERPL-SCNC: 26 MMOL/L — SIGNIFICANT CHANGE UP (ref 22–31)
CREAT SERPL-MCNC: 0.25 MG/DL — LOW (ref 0.5–1.3)
GLUCOSE BLDC GLUCOMTR-MCNC: 105 MG/DL — HIGH (ref 70–99)
GLUCOSE BLDC GLUCOMTR-MCNC: 110 MG/DL — HIGH (ref 70–99)
GLUCOSE BLDC GLUCOMTR-MCNC: 132 MG/DL — HIGH (ref 70–99)
GLUCOSE SERPL-MCNC: 114 MG/DL — HIGH (ref 70–99)
HCT VFR BLD CALC: 33.8 % — LOW (ref 34.5–45)
HGB BLD-MCNC: 9.9 G/DL — LOW (ref 11.5–15.5)
MAGNESIUM SERPL-MCNC: 1.4 MG/DL — LOW (ref 1.6–2.6)
MCHC RBC-ENTMCNC: 28.5 PG — SIGNIFICANT CHANGE UP (ref 27–34)
MCHC RBC-ENTMCNC: 29.3 GM/DL — LOW (ref 32–36)
MCV RBC AUTO: 97.4 FL — SIGNIFICANT CHANGE UP (ref 80–100)
NRBC # BLD: 0 /100 WBCS — SIGNIFICANT CHANGE UP
NRBC # FLD: 0 K/UL — SIGNIFICANT CHANGE UP
PHOSPHATE SERPL-MCNC: 2.6 MG/DL — SIGNIFICANT CHANGE UP (ref 2.5–4.5)
PLATELET # BLD AUTO: 371 K/UL — SIGNIFICANT CHANGE UP (ref 150–400)
POTASSIUM SERPL-MCNC: 3.1 MMOL/L — LOW (ref 3.5–5.3)
POTASSIUM SERPL-SCNC: 3.1 MMOL/L — LOW (ref 3.5–5.3)
RBC # BLD: 3.47 M/UL — LOW (ref 3.8–5.2)
RBC # FLD: 17.9 % — HIGH (ref 10.3–14.5)
SODIUM SERPL-SCNC: 140 MMOL/L — SIGNIFICANT CHANGE UP (ref 135–145)
WBC # BLD: 11.36 K/UL — HIGH (ref 3.8–10.5)
WBC # FLD AUTO: 11.36 K/UL — HIGH (ref 3.8–10.5)

## 2021-01-16 PROCEDURE — 93010 ELECTROCARDIOGRAM REPORT: CPT

## 2021-01-16 PROCEDURE — 99233 SBSQ HOSP IP/OBS HIGH 50: CPT | Mod: GC

## 2021-01-16 RX ORDER — MAGNESIUM SULFATE 500 MG/ML
2 VIAL (ML) INJECTION ONCE
Refills: 0 | Status: COMPLETED | OUTPATIENT
Start: 2021-01-16 | End: 2021-01-16

## 2021-01-16 RX ORDER — POTASSIUM CHLORIDE 20 MEQ
10 PACKET (EA) ORAL
Refills: 0 | Status: COMPLETED | OUTPATIENT
Start: 2021-01-16 | End: 2021-01-16

## 2021-01-16 RX ORDER — POTASSIUM CHLORIDE 20 MEQ
40 PACKET (EA) ORAL ONCE
Refills: 0 | Status: DISCONTINUED | OUTPATIENT
Start: 2021-01-16 | End: 2021-01-16

## 2021-01-16 RX ORDER — METOCLOPRAMIDE HCL 10 MG
10 TABLET ORAL ONCE
Refills: 0 | Status: COMPLETED | OUTPATIENT
Start: 2021-01-16 | End: 2021-01-16

## 2021-01-16 RX ADMIN — CEFTAZIDIME 100 GRAM(S): 6 INJECTION, POWDER, FOR SOLUTION INTRAVENOUS at 05:48

## 2021-01-16 RX ADMIN — Medication 100 MILLIGRAM(S): at 05:48

## 2021-01-16 RX ADMIN — Medication 50 GRAM(S): at 11:40

## 2021-01-16 RX ADMIN — Medication 1 PACKET(S): at 17:14

## 2021-01-16 RX ADMIN — Medication 1 TABLET(S): at 17:14

## 2021-01-16 RX ADMIN — Medication 100 MILLIGRAM(S): at 14:28

## 2021-01-16 RX ADMIN — Medication 100 MILLIEQUIVALENT(S): at 13:24

## 2021-01-16 RX ADMIN — Medication 100 MILLIGRAM(S): at 21:55

## 2021-01-16 RX ADMIN — CHLORHEXIDINE GLUCONATE 1 APPLICATION(S): 213 SOLUTION TOPICAL at 13:24

## 2021-01-16 RX ADMIN — Medication 1 PACKET(S): at 05:49

## 2021-01-16 RX ADMIN — Medication 50 MILLIGRAM(S): at 17:14

## 2021-01-16 RX ADMIN — Medication 1 APPLICATION(S): at 17:14

## 2021-01-16 RX ADMIN — Medication 50 MILLIGRAM(S): at 05:50

## 2021-01-16 RX ADMIN — Medication 100 MILLIEQUIVALENT(S): at 11:40

## 2021-01-16 RX ADMIN — AMLODIPINE BESYLATE 5 MILLIGRAM(S): 2.5 TABLET ORAL at 05:48

## 2021-01-16 RX ADMIN — Medication 1 TABLET(S): at 05:49

## 2021-01-16 RX ADMIN — ENOXAPARIN SODIUM 100 MILLIGRAM(S): 100 INJECTION SUBCUTANEOUS at 17:14

## 2021-01-16 RX ADMIN — Medication 1 APPLICATION(S): at 05:48

## 2021-01-16 RX ADMIN — Medication 10 MILLIGRAM(S): at 10:39

## 2021-01-16 RX ADMIN — CEFTAZIDIME 100 GRAM(S): 6 INJECTION, POWDER, FOR SOLUTION INTRAVENOUS at 13:25

## 2021-01-16 RX ADMIN — PANTOPRAZOLE SODIUM 40 MILLIGRAM(S): 20 TABLET, DELAYED RELEASE ORAL at 11:41

## 2021-01-16 RX ADMIN — ENOXAPARIN SODIUM 100 MILLIGRAM(S): 100 INJECTION SUBCUTANEOUS at 05:49

## 2021-01-16 RX ADMIN — Medication 100 MILLIEQUIVALENT(S): at 14:28

## 2021-01-16 NOTE — PROGRESS NOTE ADULT - ATTENDING COMMENTS
as above  still with nausea , no vomiting today. feeds held. abd exam benign  trial of reglan  cont abx  wean as tolerated  OOB if able

## 2021-01-16 NOTE — PROGRESS NOTE ADULT - ASSESSMENT
59 YO F with PMHx of HTN, DVT on Xarelto, Peritonitis s/p Oral's Procedure and Ileostomy (reversed in 2011) and AARON who presented to [facility] on 11/18 for AHRF second to COVID 19, intubated on 11/23 complicated by lung abscesses on CT CHEST 12/5, multi-bacterial PNA and bacteremia, ARTEMIO s/p CRRT and HD, and s/p Tracheostomy 12/18. Transferred to [facility] for further management.     # AMS   - AOX3 at baseline.   - CTH and EEG WNL   - Now weaned off all sedation.   - Monitor mentation and supportive care     # ARDS second to COVID vs Multi-bacterial PNA/ R Lung Abscess  - Intubated 11/23 and course complicated with multi-bacterial PNA and lung abscesses.   - Lung abscess presented on CT CHEST 12/5  - s/p Tracheostomy on 12/18 by CTSx. Sutures out 1/1/2021  - Air leak noted and s/p Trach Exchange for Bivona with CTSx 12/31  - Currently on FULL AC. PS as tolerated.   - Proventil, Chest PT and Suction PRN. Trach Care QD.     # Vasoplegic vs Septic Shock   - Weaned off pressors. Monitor BP on Norvasc and Metoprolol.   - On full AC for Hx of DVT. CTA CHEST with no PEs.     # ARTEMIO s/p CRRT and HD   - ARTEMIO now resolved and no longer required HD.   - Monitor renal function and avoid nephrotoxins.     # Dysphagia   - Initially CTSx and GI deferred PEG given Ventral Hernia   - s/p PEG placement with IR 1/11   - Continue on TF and monitor tolerance.     # Sepsis second to COVID vs Multi-bacterial PNA/ Bacteremia and R Lung Abscess   - COVID with superimposed multi-bacterial VAP vs aspiration PNA vs cavitary PNA.   - SCx (11/24) MSSA and BCx (11/27) with MSSA and Proteus Bacteremia  - SCx (12/1, 12/12 and 12/27) with Stenotrophomonas.   - SCx (1/9) with  Pseudomonas and Stenotrophomonas   - BCX has been persistently negative from 12/1, and most recently 1/12   - CT CHEST 12/4 with right lung cavitation.   - s/p multiple antibiotics, but now on Fortaz and Flagyl (1/12-1/16)     # DM2   - Continue on ISS and NPH and monitor FS.   - Adjust insulin as needed.     # Hx of DVT   - Continue on Lovenox FULL DOSE for Xarelto for now.     # Skin/ Facial Wounds   - WOC recommendations appreciated   - Plastics evaluation and recommendations of facial wound appreciated     # DVT/ GI PPX with FULL AC/ PPI   # CODE STATUS - FULL CODE   # DISPO - [facility]CU    61 YO F with PMHx of HTN, DVT on Xarelto, Peritonitis s/p Oral's Procedure and Ileostomy (reversed in 2011) and AARON who presented to [facility] on 11/18 for AHRF second to COVID 19, intubated on 11/23 complicated by lung abscesses on CT CHEST 12/5, multi-bacterial PNA and bacteremia, ARTEMIO s/p CRRT and HD, and s/p Tracheostomy 12/18. Transferred to [facility] for further management.     # AMS   - AOX3 at baseline.   - CTH and EEG WNL   - Now weaned off all sedation.   - Monitor mentation and supportive care     # ARDS second to COVID vs Multi-bacterial PNA/ R Lung Abscess  - Intubated 11/23 and course complicated with multi-bacterial PNA and lung abscesses.   - Lung abscess presented on CT CHEST 12/5  - s/p Tracheostomy on 12/18 by CTSx. Sutures out 1/1/2021  - Air leak noted and s/p Trach Exchange for Bivona with CTSx 12/31  - Currently on FULL AC. PS as tolerated.   - Proventil, Chest PT and Suction PRN. Trach Care QD.     # Vasoplegic vs Septic Shock   - Weaned off pressors. Monitor BP on Norvasc and Metoprolol.   - On full AC for Hx of DVT. CTA CHEST with no PEs.     # ARTEMIO s/p CRRT and HD   - ARTEMIO now resolved and no longer required HD.   - Monitor renal function and avoid nephrotoxins.     # Dysphagia   - Initially CTSx and GI deferred PEG given Ventral Hernia   - s/p PEG placement with IR 1/11   - Nauseous episodes noted and s/p Reglan PRN. Continue on TF and monitor tolerance.     # Sepsis second to COVID vs Multi-bacterial PNA/ Bacteremia and R Lung Abscess   - COVID with superimposed multi-bacterial VAP vs aspiration PNA vs cavitary PNA.   - SCx (11/24) MSSA and BCx (11/27) with MSSA and Proteus Bacteremia  - SCx (12/1, 12/12 and 12/27) with Stenotrophomonas.   - SCx (1/9) with  Pseudomonas and Stenotrophomonas   - BCX has been persistently negative from 12/1, and most recently 1/12   - CT CHEST 12/4 with right lung cavitation.   - s/p multiple antibiotics, but now on Fortaz and Flagyl (1/12-1/16)     # DM2   - Continue on ISS and NPH and monitor FS.   - Adjust insulin as needed.     # Hx of DVT   - Continue on Lovenox FULL DOSE for Xarelto for now.     # Skin/ Facial Wounds   - WOC recommendations appreciated   - Plastics evaluation and recommendations of facial wound appreciated     # DVT/ GI PPX with FULL AC/ PPI   # CODE STATUS - FULL CODE   # DISPO - RCU

## 2021-01-16 NOTE — PROGRESS NOTE ADULT - SUBJECTIVE AND OBJECTIVE BOX
CHIEF COMPLAINT:    SUBJECTIVE:     [ ] All other systems negative  [ ] Unable to assess ROS because ________    OBJECTIVE:  ICU Vital Signs Last 24 Hrs  T(C): 36.3 (16 Jan 2021 05:46), Max: 36.4 (15 Camilo 2021 22:57)  T(F): 97.4 (16 Jan 2021 05:46), Max: 97.6 (15 Camilo 2021 22:57)  HR: 72 (16 Jan 2021 08:49) (71 - 96)  BP: 142/65 (16 Jan 2021 05:46) (142/65 - 153/61)  BP(mean): --  ABP: --  ABP(mean): --  RR: 19 (16 Jan 2021 05:46) (19 - 20)  SpO2: 100% (16 Jan 2021 08:49) (100% - 100%)    Mode: AC/ CMV (Assist Control/ Continuous Mandatory Ventilation), RR (machine): 12, TV (machine): 450, FiO2: 40, PEEP: 5, MAP: 16, PIP: 36    01-15 @ 07:01  -  01-16 @ 07:00  --------------------------------------------------------  IN: 1790 mL / OUT: 701 mL / NET: 1089 mL    CAPILLARY BLOOD GLUCOSE  POCT Blood Glucose.: 117 mg/dL (15 Camilo 2021 23:44)    PHYSICAL EXAM:  General:   HEENT:   Lymph Nodes:  Neck:   Respiratory:   Cardiovascular:   Abdomen:   Extremities:   Skin:   Neurological:  Psychiatry:    HOSPITAL MEDICATIONS:  MEDICATIONS  (STANDING):  amLODIPine   Tablet 5 milliGRAM(s) Oral daily  cefTAZidime  IVPB 2 Gram(s) IV Intermittent every 8 hours  chlorhexidine 4% Liquid 1 Application(s) Topical daily  collagenase Ointment 1 Application(s) Topical two times a day  enoxaparin Injectable 100 milliGRAM(s) SubCutaneous every 12 hours  insulin lispro (ADMELOG) corrective regimen sliding scale   SubCutaneous every 6 hours  lactobacillus acidophilus 1 Tablet(s) Oral two times a day  metoprolol tartrate 50 milliGRAM(s) Oral two times a day  metroNIDAZOLE  IVPB      metroNIDAZOLE  IVPB 500 milliGRAM(s) IV Intermittent every 8 hours  pantoprazole   Suspension 40 milliGRAM(s) Oral daily  psyllium Powder 1 Packet(s) Oral two times a day    MEDICATIONS  (PRN):  acetaminophen    Suspension .. 650 milliGRAM(s) Oral every 6 hours PRN Temp greater or equal to 38C (100.4F)    LABS:                        9.9    11.36 )-----------( 371      ( 16 Jan 2021 07:18 )             33.8     01-16    140  |  101  |  9   ----------------------------<  114<H>  3.1<L>   |  26  |  0.25<L>    Ca    8.8      16 Jan 2021 07:18  Phos  2.6     01-16  Mg     1.4     01-16 CHIEF COMPLAINT: Patient is a 60y old  Female who presents with a chief complaint of Transfer from [facility] (16 Jan 2021 10:15)    SUBJECTIVE: No interval events overnight. Seen by bedside during AM rounds and tolerating PS well. ROS limited second to trach to vent, but denies pain or SOB.     OBJECTIVE:  ICU Vital Signs Last 24 Hrs  T(C): 36.3 (16 Jan 2021 05:46), Max: 36.4 (15 Camilo 2021 22:57)  T(F): 97.4 (16 Jan 2021 05:46), Max: 97.6 (15 Camilo 2021 22:57)  HR: 72 (16 Jan 2021 08:49) (71 - 96)  BP: 142/65 (16 Jan 2021 05:46) (142/65 - 153/61)  BP(mean): --  ABP: --  ABP(mean): --  RR: 19 (16 Jan 2021 05:46) (19 - 20)  SpO2: 100% (16 Jan 2021 08:49) (100% - 100%)    Mode: AC/ CMV (Assist Control/ Continuous Mandatory Ventilation), RR (machine): 12, TV (machine): 450, FiO2: 40, PEEP: 5, MAP: 16, PIP: 36    01-15 @ 07:01  -  01-16 @ 07:00  --------------------------------------------------------  IN: 1790 mL / OUT: 701 mL / NET: 1089 mL    CAPILLARY BLOOD GLUCOSE  POCT Blood Glucose.: 117 mg/dL (15 Camilo 2021 23:44)    PHYSICAL EXAM:  General: NAD, obese, well groomed   HEENT: NC/ AT, PERRLA, Tracheostomy C/D/I   Neck: Supple, no LAD.   Cardio: RRR, S1/S2, no murmurs or rubs.   Pulm: Coarse vent sounds noted throughout, equal bilaterally.   GI: Soft, NDNT, BS (+). PEG (+)   MS: No pedal edema    Neuro: Awake and alert with no focal neurological deficits noted.   Skin: Warm and dry. No jaundice or cyanosis     HOSPITAL MEDICATIONS:  MEDICATIONS  (STANDING):  amLODIPine   Tablet 5 milliGRAM(s) Oral daily  cefTAZidime  IVPB 2 Gram(s) IV Intermittent every 8 hours  chlorhexidine 4% Liquid 1 Application(s) Topical daily  collagenase Ointment 1 Application(s) Topical two times a day  enoxaparin Injectable 100 milliGRAM(s) SubCutaneous every 12 hours  insulin lispro (ADMELOG) corrective regimen sliding scale   SubCutaneous every 6 hours  lactobacillus acidophilus 1 Tablet(s) Oral two times a day  metoprolol tartrate 50 milliGRAM(s) Oral two times a day  metroNIDAZOLE  IVPB      metroNIDAZOLE  IVPB 500 milliGRAM(s) IV Intermittent every 8 hours  pantoprazole   Suspension 40 milliGRAM(s) Oral daily  psyllium Powder 1 Packet(s) Oral two times a day    MEDICATIONS  (PRN):  acetaminophen    Suspension .. 650 milliGRAM(s) Oral every 6 hours PRN Temp greater or equal to 38C (100.4F)    LABS:                        9.9    11.36 )-----------( 371      ( 16 Jan 2021 07:18 )             33.8     01-16    140  |  101  |  9   ----------------------------<  114<H>  3.1<L>   |  26  |  0.25<L>    Ca    8.8      16 Jan 2021 07:18  Phos  2.6     01-16  Mg     1.4     01-16

## 2021-01-17 LAB
ANION GAP SERPL CALC-SCNC: 10 MMOL/L — SIGNIFICANT CHANGE UP (ref 7–14)
BUN SERPL-MCNC: 5 MG/DL — LOW (ref 7–23)
CALCIUM SERPL-MCNC: 8 MG/DL — LOW (ref 8.4–10.5)
CHLORIDE SERPL-SCNC: 99 MMOL/L — SIGNIFICANT CHANGE UP (ref 98–107)
CO2 SERPL-SCNC: 26 MMOL/L — SIGNIFICANT CHANGE UP (ref 22–31)
CREAT SERPL-MCNC: <0.2 MG/DL — LOW (ref 0.5–1.3)
CULTURE RESULTS: SIGNIFICANT CHANGE UP
CULTURE RESULTS: SIGNIFICANT CHANGE UP
GLUCOSE BLDC GLUCOMTR-MCNC: 115 MG/DL — HIGH (ref 70–99)
GLUCOSE BLDC GLUCOMTR-MCNC: 120 MG/DL — HIGH (ref 70–99)
GLUCOSE BLDC GLUCOMTR-MCNC: 91 MG/DL — SIGNIFICANT CHANGE UP (ref 70–99)
GLUCOSE SERPL-MCNC: 109 MG/DL — HIGH (ref 70–99)
HCT VFR BLD CALC: 31.3 % — LOW (ref 34.5–45)
HGB BLD-MCNC: 8.9 G/DL — LOW (ref 11.5–15.5)
MAGNESIUM SERPL-MCNC: 1.3 MG/DL — LOW (ref 1.6–2.6)
MCHC RBC-ENTMCNC: 27.8 PG — SIGNIFICANT CHANGE UP (ref 27–34)
MCHC RBC-ENTMCNC: 28.4 GM/DL — LOW (ref 32–36)
MCV RBC AUTO: 97.8 FL — SIGNIFICANT CHANGE UP (ref 80–100)
NRBC # BLD: 0 /100 WBCS — SIGNIFICANT CHANGE UP
NRBC # FLD: 0 K/UL — SIGNIFICANT CHANGE UP
PHOSPHATE SERPL-MCNC: 2.3 MG/DL — LOW (ref 2.5–4.5)
PLATELET # BLD AUTO: 352 K/UL — SIGNIFICANT CHANGE UP (ref 150–400)
POTASSIUM SERPL-MCNC: 2.8 MMOL/L — CRITICAL LOW (ref 3.5–5.3)
POTASSIUM SERPL-SCNC: 2.8 MMOL/L — CRITICAL LOW (ref 3.5–5.3)
RBC # BLD: 3.2 M/UL — LOW (ref 3.8–5.2)
RBC # FLD: 17.7 % — HIGH (ref 10.3–14.5)
SODIUM SERPL-SCNC: 135 MMOL/L — SIGNIFICANT CHANGE UP (ref 135–145)
SPECIMEN SOURCE: SIGNIFICANT CHANGE UP
SPECIMEN SOURCE: SIGNIFICANT CHANGE UP
WBC # BLD: 13.36 K/UL — HIGH (ref 3.8–10.5)
WBC # FLD AUTO: 13.36 K/UL — HIGH (ref 3.8–10.5)

## 2021-01-17 PROCEDURE — 99233 SBSQ HOSP IP/OBS HIGH 50: CPT | Mod: GC

## 2021-01-17 RX ORDER — ZINC SULFATE TAB 220 MG (50 MG ZINC EQUIVALENT) 220 (50 ZN) MG
220 TAB ORAL DAILY
Refills: 0 | Status: DISCONTINUED | OUTPATIENT
Start: 2021-01-17 | End: 2021-03-05

## 2021-01-17 RX ORDER — SODIUM BICARBONATE 1 MEQ/ML
650 SYRINGE (ML) INTRAVENOUS ONCE
Refills: 0 | Status: COMPLETED | OUTPATIENT
Start: 2021-01-17 | End: 2021-01-17

## 2021-01-17 RX ORDER — POTASSIUM CHLORIDE 20 MEQ
10 PACKET (EA) ORAL
Refills: 0 | Status: COMPLETED | OUTPATIENT
Start: 2021-01-17 | End: 2021-01-17

## 2021-01-17 RX ORDER — PANTOPRAZOLE SODIUM 20 MG/1
40 TABLET, DELAYED RELEASE ORAL DAILY
Refills: 0 | Status: DISCONTINUED | OUTPATIENT
Start: 2021-01-17 | End: 2021-01-26

## 2021-01-17 RX ORDER — POTASSIUM CHLORIDE 20 MEQ
40 PACKET (EA) ORAL ONCE
Refills: 0 | Status: COMPLETED | OUTPATIENT
Start: 2021-01-17 | End: 2021-01-17

## 2021-01-17 RX ORDER — ASCORBIC ACID 60 MG
500 TABLET,CHEWABLE ORAL DAILY
Refills: 0 | Status: DISCONTINUED | OUTPATIENT
Start: 2021-01-17 | End: 2021-02-26

## 2021-01-17 RX ORDER — MAGNESIUM SULFATE 500 MG/ML
2 VIAL (ML) INJECTION ONCE
Refills: 0 | Status: COMPLETED | OUTPATIENT
Start: 2021-01-17 | End: 2021-01-17

## 2021-01-17 RX ADMIN — ENOXAPARIN SODIUM 100 MILLIGRAM(S): 100 INJECTION SUBCUTANEOUS at 17:00

## 2021-01-17 RX ADMIN — Medication 50 MILLIGRAM(S): at 06:13

## 2021-01-17 RX ADMIN — CHLORHEXIDINE GLUCONATE 1 APPLICATION(S): 213 SOLUTION TOPICAL at 10:32

## 2021-01-17 RX ADMIN — Medication 100 MILLIEQUIVALENT(S): at 10:30

## 2021-01-17 RX ADMIN — PANTOPRAZOLE SODIUM 40 MILLIGRAM(S): 20 TABLET, DELAYED RELEASE ORAL at 17:29

## 2021-01-17 RX ADMIN — Medication 1 APPLICATION(S): at 17:00

## 2021-01-17 RX ADMIN — Medication 100 MILLIEQUIVALENT(S): at 15:10

## 2021-01-17 RX ADMIN — ENOXAPARIN SODIUM 100 MILLIGRAM(S): 100 INJECTION SUBCUTANEOUS at 06:13

## 2021-01-17 RX ADMIN — Medication 1 TABLET(S): at 06:14

## 2021-01-17 RX ADMIN — Medication 1 APPLICATION(S): at 06:14

## 2021-01-17 RX ADMIN — Medication 100 MILLIEQUIVALENT(S): at 16:30

## 2021-01-17 RX ADMIN — Medication 100 MILLIEQUIVALENT(S): at 14:10

## 2021-01-17 RX ADMIN — Medication 100 MILLIEQUIVALENT(S): at 11:31

## 2021-01-17 RX ADMIN — Medication 100 MILLIEQUIVALENT(S): at 09:30

## 2021-01-17 RX ADMIN — Medication 50 GRAM(S): at 09:33

## 2021-01-17 RX ADMIN — Medication 50 MILLIGRAM(S): at 22:47

## 2021-01-17 RX ADMIN — CEFTAZIDIME 100 GRAM(S): 6 INJECTION, POWDER, FOR SOLUTION INTRAVENOUS at 03:01

## 2021-01-17 RX ADMIN — Medication 650 MILLIGRAM(S): at 12:06

## 2021-01-17 RX ADMIN — AMLODIPINE BESYLATE 5 MILLIGRAM(S): 2.5 TABLET ORAL at 06:14

## 2021-01-17 RX ADMIN — Medication 1 PACKET(S): at 06:13

## 2021-01-17 NOTE — PROGRESS NOTE ADULT - SUBJECTIVE AND OBJECTIVE BOX
CHIEF COMPLAINT: Patient is a 60y old  Female who presents with a chief complaint of Transfer from [facility] (16 Jan 2021 10:15)    SUBJECTIVE: No interval event overnight. Doing well on PS 15/5/40.     [ ] All other systems negative  [ ] Unable to assess ROS because ________    OBJECTIVE:  ICU Vital Signs Last 24 Hrs  T(C): 36.4 (17 Jan 2021 06:00), Max: 36.6 (16 Jan 2021 21:26)  T(F): 97.6 (17 Jan 2021 06:00), Max: 97.9 (16 Jan 2021 21:26)  HR: 74 (17 Jan 2021 07:19) (72 - 90)  BP: 158/78 (17 Jan 2021 06:00) (129/59 - 158/78)  BP(mean): --  ABP: --  ABP(mean): --  RR: 20 (17 Jan 2021 06:00) (15 - 22)  SpO2: 100% (17 Jan 2021 07:19) (100% - 100%)    Mode: CPAP with PS, FiO2: 40, PEEP: 5, PS: 15, MAP: 11, PIP: 23    01-16 @ 07:01  -  01-17 @ 07:00  --------------------------------------------------------  IN: 2300 mL / OUT: 600 mL / NET: 1700 mL    CAPILLARY BLOOD GLUCOSE  POCT Blood Glucose.: 115 mg/dL (17 Jan 2021 05:44)    PHYSICAL EXAM:  General:   HEENT:   Lymph Nodes:  Neck:   Respiratory:   Cardiovascular:   Abdomen:   Extremities:   Skin:   Neurological:  Psychiatry:    HOSPITAL MEDICATIONS:  MEDICATIONS  (STANDING):  amLODIPine   Tablet 5 milliGRAM(s) Oral daily  chlorhexidine 4% Liquid 1 Application(s) Topical daily  collagenase Ointment 1 Application(s) Topical two times a day  enoxaparin Injectable 100 milliGRAM(s) SubCutaneous every 12 hours  insulin lispro (ADMELOG) corrective regimen sliding scale   SubCutaneous every 6 hours  lactobacillus acidophilus 1 Tablet(s) Oral two times a day  magnesium sulfate  IVPB 2 Gram(s) IV Intermittent once  metoprolol tartrate 50 milliGRAM(s) Oral two times a day  pantoprazole   Suspension 40 milliGRAM(s) Oral daily  potassium chloride   Powder 40 milliEquivalent(s) Oral once  potassium chloride  10 mEq/100 mL IVPB 10 milliEquivalent(s) IV Intermittent every 1 hour  psyllium Powder 1 Packet(s) Oral two times a day    MEDICATIONS  (PRN):  acetaminophen    Suspension .. 650 milliGRAM(s) Oral every 6 hours PRN Temp greater or equal to 38C (100.4F)    LABS:                        8.9    13.36 )-----------( 352      ( 17 Jan 2021 06:48 )             31.3     01-17    135  |  99  |  5<L>  ----------------------------<  109<H>  2.8<LL>   |  26  |  <0.20<L>    Ca    8.0<L>      17 Jan 2021 06:48  Phos  2.3     01-17  Mg     1.3     01-17 CHIEF COMPLAINT: Patient is a 60y old  Female who presents with a chief complaint of Transfer from [facility] (16 Jan 2021 10:15)    SUBJECTIVE: No interval event overnight. Doing well on PS 15/5/40, but seen this morning by bedside and noted to be sleepy. Patient placed back on AC and re-visited a few hours later noted to be up and alert. Denies any pain and feels well.     OBJECTIVE:  ICU Vital Signs Last 24 Hrs  T(C): 36.4 (17 Jan 2021 06:00), Max: 36.6 (16 Jan 2021 21:26)  T(F): 97.6 (17 Jan 2021 06:00), Max: 97.9 (16 Jan 2021 21:26)  HR: 74 (17 Jan 2021 07:19) (72 - 90)  BP: 158/78 (17 Jan 2021 06:00) (129/59 - 158/78)  BP(mean): --  ABP: --  ABP(mean): --  RR: 20 (17 Jan 2021 06:00) (15 - 22)  SpO2: 100% (17 Jan 2021 07:19) (100% - 100%)    Mode: CPAP with PS, FiO2: 40, PEEP: 5, PS: 15, MAP: 11, PIP: 23    01-16 @ 07:01  -  01-17 @ 07:00  --------------------------------------------------------  IN: 2300 mL / OUT: 600 mL / NET: 1700 mL    CAPILLARY BLOOD GLUCOSE  POCT Blood Glucose.: 115 mg/dL (17 Jan 2021 05:44)    PHYSICAL EXAM:  General: NAD, obese, well groomed   HEENT: NC/ AT, PERRLA, Tracheostomy C/D/I   Neck: Supple, no LAD.   Cardio: RRR, S1/S2, no murmurs or rubs.   Pulm: Coarse vent sounds noted throughout, equal bilaterally.   GI: Soft, NDNT, BS (+). PEG (+)   MS: No pedal edema    Neuro: Awake and alert with no focal neurological deficits noted.   Skin: Warm and dry. No jaundice or cyanosis     HOSPITAL MEDICATIONS:  MEDICATIONS  (STANDING):  amLODIPine   Tablet 5 milliGRAM(s) Oral daily  chlorhexidine 4% Liquid 1 Application(s) Topical daily  collagenase Ointment 1 Application(s) Topical two times a day  enoxaparin Injectable 100 milliGRAM(s) SubCutaneous every 12 hours  insulin lispro (ADMELOG) corrective regimen sliding scale   SubCutaneous every 6 hours  lactobacillus acidophilus 1 Tablet(s) Oral two times a day  magnesium sulfate  IVPB 2 Gram(s) IV Intermittent once  metoprolol tartrate 50 milliGRAM(s) Oral two times a day  pantoprazole   Suspension 40 milliGRAM(s) Oral daily  potassium chloride   Powder 40 milliEquivalent(s) Oral once  potassium chloride  10 mEq/100 mL IVPB 10 milliEquivalent(s) IV Intermittent every 1 hour  psyllium Powder 1 Packet(s) Oral two times a day    MEDICATIONS  (PRN):  acetaminophen    Suspension .. 650 milliGRAM(s) Oral every 6 hours PRN Temp greater or equal to 38C (100.4F)    LABS:                        8.9    13.36 )-----------( 352      ( 17 Jan 2021 06:48 )             31.3     01-17    135  |  99  |  5<L>  ----------------------------<  109<H>  2.8<LL>   |  26  |  <0.20<L>    Ca    8.0<L>      17 Jan 2021 06:48  Phos  2.3     01-17  Mg     1.3     01-17

## 2021-01-17 NOTE — CHART NOTE - NSCHARTNOTEFT_GEN_A_CORE
R[facility] PA NOTE     Informed by RN of patient with clogged J TUBE. s/p Warm and Hot Water, Ginger Ale and PEG brush with no success. Viokase and NaHCO3 TABS ordered with successful unclog of J TUBE. Will continue to monitor patient.     Halima Paul PA-C  Department of Medicine/ [facility]   In House Pager #[phone]

## 2021-01-17 NOTE — PROGRESS NOTE ADULT - ASSESSMENT
59 YO F with PMHx of HTN, DVT on Xarelto, Peritonitis s/p Oral's Procedure and Ileostomy (reversed in 2011) and AARON who presented to [facility] on 11/18 for AHRF second to COVID 19, intubated on 11/23 complicated by lung abscesses on CT CHEST 12/5, multi-bacterial PNA and bacteremia, ARTEMIO s/p CRRT and HD, and s/p Tracheostomy 12/18. Transferred to [facility] for further management.     # AMS   - AOX3 at baseline.   - CTH and EEG WNL   - Now weaned off all sedation.   - Monitor mentation and supportive care     # ARDS second to COVID vs Multi-bacterial PNA/ R Lung Abscess  - Intubated 11/23 and course complicated with multi-bacterial PNA and lung abscesses.   - Lung abscess presented on CT CHEST 12/5  - s/p Tracheostomy on 12/18 by CTSx. Sutures out 1/1/2021  - Air leak noted and s/p Trach Exchange for Bivona with CTSx 12/31  - Currently on FULL AC. PS as tolerated.   - Proventil, Chest PT and Suction PRN. Trach Care QD.     # Vasoplegic vs Septic Shock   - Weaned off pressors. Monitor BP on Norvasc and Metoprolol.   - On full AC for Hx of DVT. CTA CHEST with no PEs.     # ARTEMIO s/p CRRT and HD   - ARTEMIO now resolved and no longer required HD.   - Monitor renal function and avoid nephrotoxins.     # Dysphagia   - Initially CTSx and GI deferred PEG given Ventral Hernia   - s/p PEG placement with IR 1/11   - Nauseous episodes noted and s/p Reglan PRN. Continue on TF and monitor tolerance.     # Sepsis second to COVID vs Multi-bacterial PNA/ Bacteremia and R Lung Abscess   - COVID with superimposed multi-bacterial VAP vs aspiration PNA vs cavitary PNA.   - SCx (11/24) MSSA and BCx (11/27) with MSSA and Proteus Bacteremia  - SCx (12/1, 12/12 and 12/27) with Stenotrophomonas.   - SCx (1/9) with  Pseudomonas and Stenotrophomonas   - BCX has been persistently negative from 12/1, and most recently 1/12   - CT CHEST 12/4 with right lung cavitation.   - s/p multiple antibiotics, but now on Fortaz and Flagyl (1/12-1/16)     # DM2   - Continue on ISS and NPH and monitor FS.   - Adjust insulin as needed.     # Hx of DVT   - Continue on Lovenox FULL DOSE for Xarelto for now.     # Skin/ Facial Wounds   - WOC recommendations appreciated   - Plastics evaluation and recommendations of facial wound appreciated     # DVT/ GI PPX with FULL AC/ PPI   # CODE STATUS - FULL CODE   # DISPO - [facility]    59 YO F with PMHx of HTN, DVT on Xarelto, Peritonitis s/p Oral's Procedure and Ileostomy (reversed in 2011) and AARON who presented to [facility] on 11/18 for AHRF second to COVID 19, intubated on 11/23 complicated by lung abscesses on CT CHEST 12/5, multi-bacterial PNA and bacteremia, ARTEMIO s/p CRRT and HD, and s/p Tracheostomy 12/18. Transferred to [facility] for further management.     # AMS   - AOX3 at baseline.   - CTH and EEG WNL   - Now weaned off all sedation.   - Monitor mentation and supportive care     # ARDS second to COVID vs Multi-bacterial PNA/ R Lung Abscess  - Intubated 11/23 and course complicated with multi-bacterial PNA and lung abscesses.   - Lung abscess presented on CT CHEST 12/5  - s/p Tracheostomy on 12/18 by CTSx. Sutures out 1/1/2021  - Air leak noted and s/p Trach Exchange for Bivona with CTSx 12/31  - Continue on AC vent and PS as tolerated.   - Proventil, Chest PT and Suction PRN. Trach Care QD.     # Vasoplegic vs Septic Shock   - Weaned off pressors. Monitor BP on Norvasc and Metoprolol.   - On full AC for Hx of DVT. CTA CHEST with no PEs.     # ARTEMIO s/p CRRT and HD   - ARTEMIO now resolved and no longer required HD.   - Monitor renal function and avoid nephrotoxins.     # Dysphagia   - Initially CTSx and GI deferred PEG given Ventral Hernia   - s/p PEG placement with IR 1/11   - Nauseous episodes noted and s/p Reglan PRN. Continue on TF and monitor tolerance.     # Sepsis second to COVID vs Multi-bacterial PNA/ Bacteremia and R Lung Abscess   - COVID with superimposed multi-bacterial VAP vs aspiration PNA vs cavitary PNA.   - SCx (11/24) MSSA and BCx (11/27) with MSSA and Proteus Bacteremia  - SCx (12/1, 12/12 and 12/27) with Stenotrophomonas.   - SCx (1/9) with  Pseudomonas and Stenotrophomonas   - BCX has been persistently negative from 12/1, and most recently 1/12   - CT CHEST 12/4 with right lung cavitation.   - s/p multiple antibiotics, but now on Fortaz and Flagyl (1/12-1/16)   - Monitor off antibiotics.     # DM2   - Continue on ISS and NPH and monitor FS.   - Adjust insulin as needed.     # Hx of DVT   - Continue on Lovenox FULL DOSE for Xarelto for now.     # Skin/ Facial Wounds   - WOC recommendations appreciated   - Plastics evaluation and recommendations of facial wound appreciated     # DVT/ GI PPX with FULL AC/ PPI   # CODE STATUS - FULL CODE   # DISPO - RCU

## 2021-01-18 LAB
ANION GAP SERPL CALC-SCNC: 9 MMOL/L — SIGNIFICANT CHANGE UP (ref 7–14)
BUN SERPL-MCNC: 3 MG/DL — LOW (ref 7–23)
CALCIUM SERPL-MCNC: 8.7 MG/DL — SIGNIFICANT CHANGE UP (ref 8.4–10.5)
CHLORIDE SERPL-SCNC: 98 MMOL/L — SIGNIFICANT CHANGE UP (ref 98–107)
CO2 SERPL-SCNC: 28 MMOL/L — SIGNIFICANT CHANGE UP (ref 22–31)
CREAT SERPL-MCNC: <0.2 MG/DL — LOW (ref 0.5–1.3)
GLUCOSE BLDC GLUCOMTR-MCNC: 106 MG/DL — HIGH (ref 70–99)
GLUCOSE BLDC GLUCOMTR-MCNC: 111 MG/DL — HIGH (ref 70–99)
GLUCOSE BLDC GLUCOMTR-MCNC: 115 MG/DL — HIGH (ref 70–99)
GLUCOSE BLDC GLUCOMTR-MCNC: 136 MG/DL — HIGH (ref 70–99)
GLUCOSE BLDC GLUCOMTR-MCNC: 89 MG/DL — SIGNIFICANT CHANGE UP (ref 70–99)
GLUCOSE BLDC GLUCOMTR-MCNC: 98 MG/DL — SIGNIFICANT CHANGE UP (ref 70–99)
GLUCOSE SERPL-MCNC: 112 MG/DL — HIGH (ref 70–99)
HCT VFR BLD CALC: 33.8 % — LOW (ref 34.5–45)
HGB BLD-MCNC: 10.1 G/DL — LOW (ref 11.5–15.5)
MAGNESIUM SERPL-MCNC: 1.5 MG/DL — LOW (ref 1.6–2.6)
MCHC RBC-ENTMCNC: 28.7 PG — SIGNIFICANT CHANGE UP (ref 27–34)
MCHC RBC-ENTMCNC: 29.9 GM/DL — LOW (ref 32–36)
MCV RBC AUTO: 96 FL — SIGNIFICANT CHANGE UP (ref 80–100)
NRBC # BLD: 0 /100 WBCS — SIGNIFICANT CHANGE UP
NRBC # FLD: 0 K/UL — SIGNIFICANT CHANGE UP
PHOSPHATE SERPL-MCNC: 2.4 MG/DL — LOW (ref 2.5–4.5)
PLATELET # BLD AUTO: 298 K/UL — SIGNIFICANT CHANGE UP (ref 150–400)
POTASSIUM SERPL-MCNC: 3.7 MMOL/L — SIGNIFICANT CHANGE UP (ref 3.5–5.3)
POTASSIUM SERPL-SCNC: 3.7 MMOL/L — SIGNIFICANT CHANGE UP (ref 3.5–5.3)
RBC # BLD: 3.52 M/UL — LOW (ref 3.8–5.2)
RBC # FLD: 18 % — HIGH (ref 10.3–14.5)
SODIUM SERPL-SCNC: 135 MMOL/L — SIGNIFICANT CHANGE UP (ref 135–145)
WBC # BLD: 12.23 K/UL — HIGH (ref 3.8–10.5)
WBC # FLD AUTO: 12.23 K/UL — HIGH (ref 3.8–10.5)

## 2021-01-18 PROCEDURE — 99233 SBSQ HOSP IP/OBS HIGH 50: CPT | Mod: GC

## 2021-01-18 RX ORDER — ACETAMINOPHEN 500 MG
650 TABLET ORAL EVERY 6 HOURS
Refills: 0 | Status: DISCONTINUED | OUTPATIENT
Start: 2021-01-18 | End: 2021-03-19

## 2021-01-18 RX ORDER — MAGNESIUM SULFATE 500 MG/ML
2 VIAL (ML) INJECTION ONCE
Refills: 0 | Status: COMPLETED | OUTPATIENT
Start: 2021-01-18 | End: 2021-01-18

## 2021-01-18 RX ADMIN — PANTOPRAZOLE SODIUM 40 MILLIGRAM(S): 20 TABLET, DELAYED RELEASE ORAL at 10:59

## 2021-01-18 RX ADMIN — Medication 1 TABLET(S): at 06:20

## 2021-01-18 RX ADMIN — Medication 1 PACKET(S): at 06:20

## 2021-01-18 RX ADMIN — Medication 1 TABLET(S): at 18:00

## 2021-01-18 RX ADMIN — Medication 50 GRAM(S): at 09:25

## 2021-01-18 RX ADMIN — ZINC SULFATE TAB 220 MG (50 MG ZINC EQUIVALENT) 220 MILLIGRAM(S): 220 (50 ZN) TAB at 11:00

## 2021-01-18 RX ADMIN — AMLODIPINE BESYLATE 5 MILLIGRAM(S): 2.5 TABLET ORAL at 06:20

## 2021-01-18 RX ADMIN — Medication 1 APPLICATION(S): at 18:00

## 2021-01-18 RX ADMIN — ENOXAPARIN SODIUM 100 MILLIGRAM(S): 100 INJECTION SUBCUTANEOUS at 06:20

## 2021-01-18 RX ADMIN — Medication 500 MILLIGRAM(S): at 10:59

## 2021-01-18 RX ADMIN — Medication 1 TABLET(S): at 11:00

## 2021-01-18 RX ADMIN — Medication 650 MILLIGRAM(S): at 06:31

## 2021-01-18 RX ADMIN — Medication 50 MILLIGRAM(S): at 06:20

## 2021-01-18 RX ADMIN — Medication 1 PACKET(S): at 18:00

## 2021-01-18 RX ADMIN — Medication 50 MILLIGRAM(S): at 18:01

## 2021-01-18 RX ADMIN — ENOXAPARIN SODIUM 100 MILLIGRAM(S): 100 INJECTION SUBCUTANEOUS at 18:00

## 2021-01-18 RX ADMIN — CHLORHEXIDINE GLUCONATE 1 APPLICATION(S): 213 SOLUTION TOPICAL at 10:59

## 2021-01-18 RX ADMIN — Medication 1 APPLICATION(S): at 06:38

## 2021-01-18 NOTE — PROGRESS NOTE ADULT - SUBJECTIVE AND OBJECTIVE BOX
CHIEF COMPLAINT: CHIEF COMPLAINT:    Interval Events: Pt seen with team at bedside     REVIEW OF SYSTEMS:  Constitutional:   Eyes:  ENT:  CV:  Resp:  GI:  :  MSK:  Integumentary:  Neurological:  Psychiatric:  Endocrine:  Hematologic/Lymphatic:  Allergic/Immunologic:  [ ] All other systems negative    OBJECTIVE:  ICU Vital Signs Last 24 Hrs  T(C): 36.2 (18 Jan 2021 06:18), Max: 36.5 (17 Jan 2021 17:00)  T(F): 97.1 (18 Jan 2021 06:18), Max: 97.7 (17 Jan 2021 17:00)  HR: 89 (18 Jan 2021 06:18) (74 - 94)  BP: 150/65 (18 Jan 2021 06:18) (135/66 - 157/69)  BP(mean): --  ABP: --  ABP(mean): --  RR: 22 (18 Jan 2021 06:18) (19 - 30)  SpO2: 100% (18 Jan 2021 06:18) (100% - 100%)    Mode: AC/ CMV (Assist Control/ Continuous Mandatory Ventilation), RR (machine): 12, TV (machine): 450, FiO2: 40, PEEP: 5, MAP: 13, PIP: 30    01-17 @ 07:01  -  01-18 @ 07:00  --------------------------------------------------------  IN: 1535 mL / OUT: 2000 mL / NET: -465 mL      CAPILLARY BLOOD GLUCOSE      POCT Blood Glucose.: 111 mg/dL (18 Jan 2021 06:40)        HOSPITAL MEDICATIONS:  MEDICATIONS  (STANDING):  amLODIPine   Tablet 5 milliGRAM(s) Oral daily  ascorbic acid 500 milliGRAM(s) Oral daily  chlorhexidine 4% Liquid 1 Application(s) Topical daily  collagenase Ointment 1 Application(s) Topical two times a day  enoxaparin Injectable 100 milliGRAM(s) SubCutaneous every 12 hours  insulin lispro (ADMELOG) corrective regimen sliding scale   SubCutaneous every 6 hours  lactobacillus acidophilus 1 Tablet(s) Oral two times a day  metoprolol tartrate 50 milliGRAM(s) Oral two times a day  multivitamin 1 Tablet(s) Oral daily  pantoprazole  Injectable 40 milliGRAM(s) IV Push daily  psyllium Powder 1 Packet(s) Oral two times a day  zinc sulfate 220 milliGRAM(s) Oral daily    MEDICATIONS  (PRN):  acetaminophen    Suspension .. 650 milliGRAM(s) Oral every 6 hours PRN Temp greater or equal to 38C (100.4F), Mild Pain (1 - 3), Moderate Pain (4 - 6)      LABS:                        10.1   12.23 )-----------( 298      ( 18 Jan 2021 07:43 )             33.8     01-17    135  |  99  |  5<L>  ----------------------------<  109<H>  2.8<LL>   |  26  |  <0.20<L>    Ca    8.0<L>      17 Jan 2021 06:48  Phos  2.3     01-17  Mg     1.3     01-17                MICROBIOLOGY:     RADIOLOGY:  [ ] Reviewed and interpreted by me    PULMONARY FUNCTION TESTS:    EKG:    Interval Events:    REVIEW OF SYSTEMS:  Constitutional:   Eyes:  ENT:  CV:  Resp:  GI:  :  MSK:  Integumentary:  Neurological:  Psychiatric:  Endocrine:  Hematologic/Lymphatic:  Allergic/Immunologic:  [ ] All other systems negative      OBJECTIVE:  ICU Vital Signs Last 24 Hrs  T(C): 36.2 (18 Jan 2021 06:18), Max: 36.5 (17 Jan 2021 17:00)  T(F): 97.1 (18 Jan 2021 06:18), Max: 97.7 (17 Jan 2021 17:00)  HR: 89 (18 Jan 2021 06:18) (74 - 94)  BP: 150/65 (18 Jan 2021 06:18) (135/66 - 157/69)  BP(mean): --  ABP: --  ABP(mean): --  RR: 22 (18 Jan 2021 06:18) (19 - 30)  SpO2: 100% (18 Jan 2021 06:18) (100% - 100%)    Mode: AC/ CMV (Assist Control/ Continuous Mandatory Ventilation), RR (machine): 12, TV (machine): 450, FiO2: 40, PEEP: 5, MAP: 13, PIP: 30    01-17 @ 07:01  -  01-18 @ 07:00  --------------------------------------------------------  IN: 1535 mL / OUT: 2000 mL / NET: -465 mL      CAPILLARY BLOOD GLUCOSE      POCT Blood Glucose.: 111 mg/dL (18 Jan 2021 06:40)      PHYSICAL EXAM:  General:   HEENT:   Lymph Nodes:  Neck:   Respiratory:   Cardiovascular:   Abdomen:   Extremities:   Skin:   Neurological:  Psychiatry:    HOSPITAL MEDICATIONS:  MEDICATIONS  (STANDING):  amLODIPine   Tablet 5 milliGRAM(s) Oral daily  ascorbic acid 500 milliGRAM(s) Oral daily  chlorhexidine 4% Liquid 1 Application(s) Topical daily  collagenase Ointment 1 Application(s) Topical two times a day  enoxaparin Injectable 100 milliGRAM(s) SubCutaneous every 12 hours  insulin lispro (ADMELOG) corrective regimen sliding scale   SubCutaneous every 6 hours  lactobacillus acidophilus 1 Tablet(s) Oral two times a day  metoprolol tartrate 50 milliGRAM(s) Oral two times a day  multivitamin 1 Tablet(s) Oral daily  pantoprazole  Injectable 40 milliGRAM(s) IV Push daily  psyllium Powder 1 Packet(s) Oral two times a day  zinc sulfate 220 milliGRAM(s) Oral daily    MEDICATIONS  (PRN):  acetaminophen    Suspension .. 650 milliGRAM(s) Oral every 6 hours PRN Temp greater or equal to 38C (100.4F), Mild Pain (1 - 3), Moderate Pain (4 - 6)      LABS:                        10.1   12.23 )-----------( 298      ( 18 Jan 2021 07:43 )             33.8     01-17    135  |  99  |  5<L>  ----------------------------<  109<H>  2.8<LL>   |  26  |  <0.20<L>    Ca    8.0<L>      17 Jan 2021 06:48  Phos  2.3     01-17  Mg     1.3     01-17                MICROBIOLOGY:     RADIOLOGY:  [ ] Reviewed and interpreted by me    PULMONARY FUNCTION TESTS:    EKG: CHIEF COMPLAINT: CHIEF COMPLAINT:    Interval Events: Pt seen with team at bedside     REVIEW OF SYSTEMS:  Constitutional: Denies pain /chills   CV: Denies   Resp: Denies   GI: Denies   :  MSK: Denies   Neurological: Awake alert   [x ] All other systems negative    OBJECTIVE:  ICU Vital Signs Last 24 Hrs  T(C): 36.2 (18 Jan 2021 06:18), Max: 36.5 (17 Jan 2021 17:00)  T(F): 97.1 (18 Jan 2021 06:18), Max: 97.7 (17 Jan 2021 17:00)  HR: 89 (18 Jan 2021 06:18) (74 - 94)  BP: 150/65 (18 Jan 2021 06:18) (135/66 - 157/69)  BP(mean): --  ABP: --  ABP(mean): --  RR: 22 (18 Jan 2021 06:18) (19 - 30)  SpO2: 100% (18 Jan 2021 06:18) (100% - 100%)    Mode: AC/ CMV (Assist Control/ Continuous Mandatory Ventilation), RR (machine): 12, TV (machine): 450, FiO2: 40, PEEP: 5, MAP: 13, PIP: 30    01-17 @ 07:01  -  01-18 @ 07:00  --------------------------------------------------------  IN: 1535 mL / OUT: 2000 mL / NET: -465 mL      CAPILLARY BLOOD GLUCOSE      POCT Blood Glucose.: 111 mg/dL (18 Jan 2021 06:40)        HOSPITAL MEDICATIONS:  MEDICATIONS  (STANDING):  amLODIPine   Tablet 5 milliGRAM(s) Oral daily  ascorbic acid 500 milliGRAM(s) Oral daily  chlorhexidine 4% Liquid 1 Application(s) Topical daily  collagenase Ointment 1 Application(s) Topical two times a day  enoxaparin Injectable 100 milliGRAM(s) SubCutaneous every 12 hours  insulin lispro (ADMELOG) corrective regimen sliding scale   SubCutaneous every 6 hours  lactobacillus acidophilus 1 Tablet(s) Oral two times a day  metoprolol tartrate 50 milliGRAM(s) Oral two times a day  multivitamin 1 Tablet(s) Oral daily  pantoprazole  Injectable 40 milliGRAM(s) IV Push daily  psyllium Powder 1 Packet(s) Oral two times a day  zinc sulfate 220 milliGRAM(s) Oral daily    MEDICATIONS  (PRN):  acetaminophen    Suspension .. 650 milliGRAM(s) Oral every 6 hours PRN Temp greater or equal to 38C (100.4F), Mild Pain (1 - 3), Moderate Pain (4 - 6)      LABS:                        10.1   12.23 )-----------( 298      ( 18 Jan 2021 07:43 )             33.8     01-17    135  |  99  |  5<L>  ----------------------------<  109<H>  2.8<LL>   |  26  |  <0.20<L>    Ca    8.0<L>      17 Jan 2021 06:48  Phos  2.3     01-17  Mg     1.3     01-17                MICROBIOLOGY:     RADIOLOGY:  [ ] Reviewed and interpreted by me    PULMONARY FUNCTION TESTS:    EKG:

## 2021-01-18 NOTE — PROGRESS NOTE ADULT - ASSESSMENT
59 YO F with PMHx of HTN, DVT on Xarelto, Peritonitis s/p Oral's Procedure and Ileostomy (reversed in 2011) and AARON who presented to [facility] on 11/18 for AHRF second to COVID 19, intubated on 11/23 complicated by lung abscesses on CT CHEST 12/5, multi-bacterial PNA and bacteremia, ARTEMIO s/p CRRT and HD, and s/p Tracheostomy 12/18. Transferred to RCU for further management.     # AMS   - AOX3 at baseline.   - CTH and EEG WNL   - Now weaned off all sedation.   - Monitor mentation and supportive care     # ARDS second to COVID vs Multi-bacterial PNA/ R Lung Abscess  - Intubated 11/23 and course complicated with multi-bacterial PNA and lung abscesses.   - Lung abscess presented on CT CHEST 12/5  - s/p Tracheostomy on 12/18 by CTSx. Sutures out 1/1/2021  - Air leak noted and s/p Trach Exchange for Bivona with CTSx 12/31  - Continue on AC vent and PS as tolerated.   - Proventil, Chest PT and Suction PRN. Trach Care QD.     # Vasoplegic vs Septic Shock   - Weaned off pressors. Monitor BP on Norvasc and Metoprolol.   - On full AC for Hx of DVT. CTA CHEST with no PEs.     # ARTEMIO s/p CRRT and HD   - ARTEMIO now resolved and no longer required HD.   - Monitor renal function and avoid nephrotoxins.     # Dysphagia   - Initially CTSx and GI deferred PEG given Ventral Hernia   - s/p PEG placement with IR 1/11   - Nauseous episodes noted and s/p Reglan PRN. Continue on TF and monitor tolerance.     # Sepsis second to COVID vs Multi-bacterial PNA/ Bacteremia and R Lung Abscess   - COVID with superimposed multi-bacterial VAP vs aspiration PNA vs cavitary PNA.   - SCx (11/24) MSSA and BCx (11/27) with MSSA and Proteus Bacteremia  - SCx (12/1, 12/12 and 12/27) with Stenotrophomonas.   - SCx (1/9) with  Pseudomonas and Stenotrophomonas   - BCX has been persistently negative from 12/1, and most recently 1/12   - CT CHEST 12/4 with right lung cavitation.   - s/p multiple antibiotics, but now on Fortaz and Flagyl (1/12-1/16)   - Monitor off antibiotics.     # DM2   - Continue on ISS and NPH and monitor FS.   - Adjust insulin as needed.     # Hx of DVT   - Continue on Lovenox FULL DOSE for Xarelto for now.     # Skin/ Facial Wounds   - WOC recommendations appreciated   - Plastics evaluation and recommendations of facial wound appreciated     # DVT/ GI PPX with FULL AC/ PPI   # CODE STATUS - FULL CODE   # DISPO - RCU    61 YO F with PMHx of HTN, DVT on Xarelto, Peritonitis s/p Oral's Procedure and Ileostomy (reversed in 2011) and AARON who presented to [facility] on 11/18 for AHRF second to COVID 19, intubated on 11/23 complicated by lung abscesses on CT CHEST 12/5, multi-bacterial PNA and bacteremia, ARTEMIO s/p CRRT and HD, and s/p Tracheostomy 12/18. Transferred to RCU for further management.     # AMS   - AOX3 at baseline.   - CTH and EEG WNL   - Now weaned off all sedation.   - Monitor mentation and supportive care   - More alert/follows commands     # ARDS second to COVID vs Multi-bacterial PNA/ R Lung Abscess  - Intubated 11/23 and course complicated with multi-bacterial PNA and lung abscesses.   - Lung abscess presented on CT CHEST 12/5  - s/p Tracheostomy on 12/18 by CTSx. Sutures out 1/1/2021  - Air leak noted and s/p Trach Exchange for Bivona with CTSx 12/31  - Continue on AC vent and PS as tolerated.   - Proventil, Chest PT and Suction PRN. Trach Care QD.     # Vasoplegic vs Septic Shock   - Weaned off pressors. Monitor BP on Norvasc and Metoprolol.   - On full AC for Hx of DVT. CTA CHEST with no PEs.     # ARTEMIO s/p CRRT and HD   - ARTEMIO now resolved and no longer required HD.   - Monitor renal function and avoid nephrotoxins.     # Dysphagia   - Initially CTSx and GI deferred PEG given Ventral Hernia   - s/p PEG placement with IR 1/11   - Nauseous episodes noted and s/p Reglan PRN. Continue on TF and monitor tolerance.     # Sepsis second to COVID vs Multi-bacterial PNA/ Bacteremia and R Lung Abscess   - COVID with superimposed multi-bacterial VAP vs aspiration PNA vs cavitary PNA.   - SCx (11/24) MSSA and BCx (11/27) with MSSA and Proteus Bacteremia  - SCx (12/1, 12/12 and 12/27) with Stenotrophomonas.   - SCx (1/9) with  Pseudomonas and Stenotrophomonas   - BCX has been persistently negative from 12/1, and most recently 1/12   - CT CHEST 12/4 with right lung cavitation.   - s/p multiple antibiotics, but now on Fortaz and Flagyl (1/12-1/16)   - Monitor off antibiotics.     # DM2   - Continue on ISS and NPH and monitor FS.   - Adjust insulin as needed.     # Hx of DVT   - Continue on Lovenox FULL DOSE for Xarelto for now.     # Skin/ Facial Wounds   - WOC recommendations appreciated   - Plastics evaluation and recommendations of facial wound appreciated     # DVT/ GI PPX with FULL AC/ PPI   # CODE STATUS - FULL CODE   # DISPO - RCU

## 2021-01-19 LAB
ANION GAP SERPL CALC-SCNC: 8 MMOL/L — SIGNIFICANT CHANGE UP (ref 7–14)
BUN SERPL-MCNC: 4 MG/DL — LOW (ref 7–23)
CALCIUM SERPL-MCNC: 8.7 MG/DL — SIGNIFICANT CHANGE UP (ref 8.4–10.5)
CHLORIDE SERPL-SCNC: 99 MMOL/L — SIGNIFICANT CHANGE UP (ref 98–107)
CO2 SERPL-SCNC: 29 MMOL/L — SIGNIFICANT CHANGE UP (ref 22–31)
CREAT SERPL-MCNC: <0.2 MG/DL — LOW (ref 0.5–1.3)
GLUCOSE BLDC GLUCOMTR-MCNC: 109 MG/DL — HIGH (ref 70–99)
GLUCOSE BLDC GLUCOMTR-MCNC: 120 MG/DL — HIGH (ref 70–99)
GLUCOSE BLDC GLUCOMTR-MCNC: 128 MG/DL — HIGH (ref 70–99)
GLUCOSE SERPL-MCNC: 114 MG/DL — HIGH (ref 70–99)
HCT VFR BLD CALC: 29.3 % — LOW (ref 34.5–45)
HGB BLD-MCNC: 8.5 G/DL — LOW (ref 11.5–15.5)
MAGNESIUM SERPL-MCNC: 1.6 MG/DL — SIGNIFICANT CHANGE UP (ref 1.6–2.6)
MCHC RBC-ENTMCNC: 28.1 PG — SIGNIFICANT CHANGE UP (ref 27–34)
MCHC RBC-ENTMCNC: 29 GM/DL — LOW (ref 32–36)
MCV RBC AUTO: 96.7 FL — SIGNIFICANT CHANGE UP (ref 80–100)
NRBC # BLD: 0 /100 WBCS — SIGNIFICANT CHANGE UP
NRBC # FLD: 0 K/UL — SIGNIFICANT CHANGE UP
PHOSPHATE SERPL-MCNC: 3.1 MG/DL — SIGNIFICANT CHANGE UP (ref 2.5–4.5)
PLATELET # BLD AUTO: 304 K/UL — SIGNIFICANT CHANGE UP (ref 150–400)
POTASSIUM SERPL-MCNC: 3.5 MMOL/L — SIGNIFICANT CHANGE UP (ref 3.5–5.3)
POTASSIUM SERPL-SCNC: 3.5 MMOL/L — SIGNIFICANT CHANGE UP (ref 3.5–5.3)
RBC # BLD: 3.03 M/UL — LOW (ref 3.8–5.2)
RBC # FLD: 17.8 % — HIGH (ref 10.3–14.5)
SODIUM SERPL-SCNC: 136 MMOL/L — SIGNIFICANT CHANGE UP (ref 135–145)
WBC # BLD: 9 K/UL — SIGNIFICANT CHANGE UP (ref 3.8–10.5)
WBC # FLD AUTO: 9 K/UL — SIGNIFICANT CHANGE UP (ref 3.8–10.5)

## 2021-01-19 PROCEDURE — 99233 SBSQ HOSP IP/OBS HIGH 50: CPT

## 2021-01-19 RX ORDER — MAGNESIUM SULFATE 500 MG/ML
2 VIAL (ML) INJECTION ONCE
Refills: 0 | Status: COMPLETED | OUTPATIENT
Start: 2021-01-19 | End: 2021-01-19

## 2021-01-19 RX ADMIN — Medication 1 APPLICATION(S): at 05:51

## 2021-01-19 RX ADMIN — Medication 1 PACKET(S): at 05:52

## 2021-01-19 RX ADMIN — Medication 50 MILLIGRAM(S): at 05:51

## 2021-01-19 RX ADMIN — ZINC SULFATE TAB 220 MG (50 MG ZINC EQUIVALENT) 220 MILLIGRAM(S): 220 (50 ZN) TAB at 11:58

## 2021-01-19 RX ADMIN — ENOXAPARIN SODIUM 100 MILLIGRAM(S): 100 INJECTION SUBCUTANEOUS at 17:25

## 2021-01-19 RX ADMIN — Medication 1 TABLET(S): at 11:58

## 2021-01-19 RX ADMIN — Medication 500 MILLIGRAM(S): at 11:58

## 2021-01-19 RX ADMIN — Medication 650 MILLIGRAM(S): at 21:31

## 2021-01-19 RX ADMIN — AMLODIPINE BESYLATE 5 MILLIGRAM(S): 2.5 TABLET ORAL at 05:51

## 2021-01-19 RX ADMIN — Medication 1 TABLET(S): at 17:27

## 2021-01-19 RX ADMIN — CHLORHEXIDINE GLUCONATE 1 APPLICATION(S): 213 SOLUTION TOPICAL at 11:58

## 2021-01-19 RX ADMIN — Medication 1 TABLET(S): at 05:51

## 2021-01-19 RX ADMIN — PANTOPRAZOLE SODIUM 40 MILLIGRAM(S): 20 TABLET, DELAYED RELEASE ORAL at 11:59

## 2021-01-19 RX ADMIN — Medication 1 APPLICATION(S): at 17:25

## 2021-01-19 RX ADMIN — ENOXAPARIN SODIUM 100 MILLIGRAM(S): 100 INJECTION SUBCUTANEOUS at 05:51

## 2021-01-19 RX ADMIN — Medication 50 MILLIGRAM(S): at 17:27

## 2021-01-19 RX ADMIN — Medication 50 GRAM(S): at 09:22

## 2021-01-19 RX ADMIN — Medication 1 PACKET(S): at 17:27

## 2021-01-19 NOTE — PROGRESS NOTE ADULT - ASSESSMENT
59 YO F with PMHx of HTN, DVT on Xarelto, Peritonitis s/p Oral's Procedure and Ileostomy (reversed in 2011) and AARON who presented to [facility] on 11/18 for AHRF second to COVID 19, intubated on 11/23 complicated by lung abscesses on CT CHEST 12/5, multi-bacterial PNA and bacteremia, ARTEMIO s/p CRRT and HD, and s/p Tracheostomy 12/18. Transferred to RCU for further management.     # AMS   - AOX3 at baseline.   - CTH and EEG WNL   - Now weaned off all sedation.   - Monitor mentation and supportive care   - More alert/follows commands     # ARDS second to COVID vs Multi-bacterial PNA/ R Lung Abscess  - Intubated 11/23 and course complicated with multi-bacterial PNA and lung abscesses.   - Lung abscess presented on CT CHEST 12/5  - s/p Tracheostomy on 12/18 by CTSx. Sutures out 1/1/2021  - Air leak noted and s/p Trach Exchange for Bivona with CTSx 12/31  - Continue on AC vent and PS as tolerated.   - Proventil, Chest PT and Suction PRN. Trach Care QD.     # Vasoplegic vs Septic Shock   - Weaned off pressors. Monitor BP on Norvasc and Metoprolol.   - On full AC for Hx of DVT. CTA CHEST with no PEs.     # ARTEMIO s/p CRRT and HD   - ARTEMIO now resolved and no longer required HD.   - Monitor renal function and avoid nephrotoxins.     # Dysphagia   - Initially CTSx and GI deferred PEG given Ventral Hernia   - s/p PEG placement with IR 1/11   - Nauseous episodes noted and s/p Reglan PRN. Continue on TF and monitor tolerance.     # Sepsis second to COVID vs Multi-bacterial PNA/ Bacteremia and R Lung Abscess   - COVID with superimposed multi-bacterial VAP vs aspiration PNA vs cavitary PNA.   - SCx (11/24) MSSA and BCx (11/27) with MSSA and Proteus Bacteremia  - SCx (12/1, 12/12 and 12/27) with Stenotrophomonas.   - SCx (1/9) with  Pseudomonas and Stenotrophomonas   - BCX has been persistently negative from 12/1, and most recently 1/12   - CT CHEST 12/4 with right lung cavitation.   - s/p multiple antibiotics, but now on Fortaz and Flagyl (1/12-1/16)   - Monitor off antibiotics.     # DM2   - Continue on ISS and NPH and monitor FS.   - Adjust insulin as needed.     # Hx of DVT   - Continue on Lovenox FULL DOSE for Xarelto for now.     # Skin/ Facial Wounds   - WOC recommendations appreciated   - Plastics evaluation and recommendations of facial wound appreciated     # DVT/ GI PPX with FULL AC/ PPI   # CODE STATUS - FULL CODE   # DISPO - RCU    61 YO F with PMHx of HTN, DVT on Xarelto, Peritonitis s/p Oral's Procedure and Ileostomy (reversed in 2011) and AARON who presented to [facility] on 11/18 for AHRF second to COVID 19, intubated on 11/23 complicated by lung abscesses on CT CHEST 12/5, multi-bacterial PNA and bacteremia, ARTEMIO s/p CRRT and HD, and s/p Tracheostomy 12/18. Transferred to RCU for further management.     # AMS   - AOX3 at baseline.   - CTH and EEG WNL   - Now weaned off all sedation.   - Monitor mentation and supportive care   - More alert/follows commands     # ARDS second to COVID vs Multi-bacterial PNA/ R Lung Abscess  - Intubated 11/23 and course complicated with multi-bacterial PNA and lung abscesses.   - Lung abscess presented on CT CHEST 12/5  - s/p Tracheostomy on 12/18 by CTSx. Sutures out 1/1/2021  - Air leak noted and s/p Trach Exchange for Bivona with CTSx 12/31  - Continue on AC vent and PS as tolerated.   - Proventil, Chest PT and Suction PRN. Trach Care QD.     # Vasoplegic vs Septic Shock   - Weaned off pressors. Monitor BP on Norvasc and Metoprolol.   - On full AC for Hx of DVT. CTA CHEST with no PEs.     # ARTEMIO s/p CRRT and HD   - ARTEMIO now resolved and no longer required HD.   - Monitor renal function and avoid nephrotoxins.     # Dysphagia   - Initially CTSx and GI deferred PEG given Ventral Hernia   - s/p PEG placement with IR 1/11   - Nauseous episodes noted and s/p Reglan PRN. Continue on TF and monitor tolerance.   - No new sx     # Sepsis second to COVID vs Multi-bacterial PNA/ Bacteremia and R Lung Abscess   - COVID with superimposed multi-bacterial VAP vs aspiration PNA vs cavitary PNA.   - SCx (11/24) MSSA and BCx (11/27) with MSSA and Proteus Bacteremia  - SCx (12/1, 12/12 and 12/27) with Stenotrophomonas.   - SCx (1/9) with  Pseudomonas and Stenotrophomonas   - BCX has been persistently negative from 12/1, and most recently 1/12   - CT CHEST 12/4 with right lung cavitation.   - s/p multiple antibiotics, but now on Fortaz and Flagyl (1/12-1/16)   - Monitor off antibiotics.     # DM2   - Continue on ISS and NPH and monitor FS.   - Adjust insulin as needed.     # Hx of DVT   - Continue on Lovenox FULL DOSE for Xarelto for now.     # Skin/ Facial Wounds   - WOC recommendations appreciated   - Plastics evaluation and recommendations of facial wound appreciated     # DVT/ GI PPX with FULL AC/ PPI   # CODE STATUS - FULL CODE   # DISPO - RCU

## 2021-01-19 NOTE — PROGRESS NOTE ADULT - ATTENDING COMMENTS
60F with PMHX as above presenting to [facility] for hypoxemic respiratory failure with ARDS 2/2 COVID19 PNA further c/b superimposed MSSA cavitary PNA and ARF requiring HD. Pt with failure to wean from mechanical ventilation, s/p tracheostomy placement 12/18 and transfer to RCU 12/28. Clinically, the patient is doing well, awake and alert following commands. Cont daily SBTs as tolerated. Cont airway clearance therapy and trach care as per RCU team. Patient now afebrile, off antibiotics.  Pt with oropharyngeal dysphagia, IR guided PEG successfully placed 1/11. Pt tolerating PEG feeds. Pt also initially with ARF on CRRT, following by intermittent HD (last session 12/17) with renal recovery. Dispo pending medical optimization.

## 2021-01-20 LAB
ANION GAP SERPL CALC-SCNC: 10 MMOL/L — SIGNIFICANT CHANGE UP (ref 7–14)
BUN SERPL-MCNC: 5 MG/DL — LOW (ref 7–23)
CALCIUM SERPL-MCNC: 8.9 MG/DL — SIGNIFICANT CHANGE UP (ref 8.4–10.5)
CHLORIDE SERPL-SCNC: 100 MMOL/L — SIGNIFICANT CHANGE UP (ref 98–107)
CO2 SERPL-SCNC: 28 MMOL/L — SIGNIFICANT CHANGE UP (ref 22–31)
CREAT SERPL-MCNC: 0.27 MG/DL — LOW (ref 0.5–1.3)
GLUCOSE BLDC GLUCOMTR-MCNC: 115 MG/DL — HIGH (ref 70–99)
GLUCOSE BLDC GLUCOMTR-MCNC: 122 MG/DL — HIGH (ref 70–99)
GLUCOSE BLDC GLUCOMTR-MCNC: 128 MG/DL — HIGH (ref 70–99)
GLUCOSE BLDC GLUCOMTR-MCNC: 129 MG/DL — HIGH (ref 70–99)
GLUCOSE BLDC GLUCOMTR-MCNC: 135 MG/DL — HIGH (ref 70–99)
GLUCOSE SERPL-MCNC: 142 MG/DL — HIGH (ref 70–99)
HCT VFR BLD CALC: 33 % — LOW (ref 34.5–45)
HGB BLD-MCNC: 9.5 G/DL — LOW (ref 11.5–15.5)
MAGNESIUM SERPL-MCNC: 1.5 MG/DL — LOW (ref 1.6–2.6)
MCHC RBC-ENTMCNC: 27.8 PG — SIGNIFICANT CHANGE UP (ref 27–34)
MCHC RBC-ENTMCNC: 28.8 GM/DL — LOW (ref 32–36)
MCV RBC AUTO: 96.5 FL — SIGNIFICANT CHANGE UP (ref 80–100)
NRBC # BLD: 0 /100 WBCS — SIGNIFICANT CHANGE UP
NRBC # FLD: 0 K/UL — SIGNIFICANT CHANGE UP
PHOSPHATE SERPL-MCNC: 2.6 MG/DL — SIGNIFICANT CHANGE UP (ref 2.5–4.5)
PLATELET # BLD AUTO: 367 K/UL — SIGNIFICANT CHANGE UP (ref 150–400)
POTASSIUM SERPL-MCNC: 3.6 MMOL/L — SIGNIFICANT CHANGE UP (ref 3.5–5.3)
POTASSIUM SERPL-SCNC: 3.6 MMOL/L — SIGNIFICANT CHANGE UP (ref 3.5–5.3)
RBC # BLD: 3.42 M/UL — LOW (ref 3.8–5.2)
RBC # FLD: 17.7 % — HIGH (ref 10.3–14.5)
SODIUM SERPL-SCNC: 138 MMOL/L — SIGNIFICANT CHANGE UP (ref 135–145)
WBC # BLD: 17.09 K/UL — HIGH (ref 3.8–10.5)
WBC # FLD AUTO: 17.09 K/UL — HIGH (ref 3.8–10.5)

## 2021-01-20 PROCEDURE — 99233 SBSQ HOSP IP/OBS HIGH 50: CPT

## 2021-01-20 RX ORDER — MAGNESIUM SULFATE 500 MG/ML
2 VIAL (ML) INJECTION ONCE
Refills: 0 | Status: COMPLETED | OUTPATIENT
Start: 2021-01-20 | End: 2021-01-20

## 2021-01-20 RX ORDER — MULTIVIT-MIN/FERROUS GLUCONATE 9 MG/15 ML
15 LIQUID (ML) ORAL DAILY
Refills: 0 | Status: DISCONTINUED | OUTPATIENT
Start: 2021-01-20 | End: 2021-02-08

## 2021-01-20 RX ADMIN — Medication 650 MILLIGRAM(S): at 11:29

## 2021-01-20 RX ADMIN — AMLODIPINE BESYLATE 5 MILLIGRAM(S): 2.5 TABLET ORAL at 05:05

## 2021-01-20 RX ADMIN — Medication 1 TABLET(S): at 11:29

## 2021-01-20 RX ADMIN — PANTOPRAZOLE SODIUM 40 MILLIGRAM(S): 20 TABLET, DELAYED RELEASE ORAL at 11:31

## 2021-01-20 RX ADMIN — ENOXAPARIN SODIUM 100 MILLIGRAM(S): 100 INJECTION SUBCUTANEOUS at 17:57

## 2021-01-20 RX ADMIN — Medication 1 TABLET(S): at 17:58

## 2021-01-20 RX ADMIN — Medication 50 MILLIGRAM(S): at 17:58

## 2021-01-20 RX ADMIN — Medication 1 PACKET(S): at 05:05

## 2021-01-20 RX ADMIN — Medication 1 PACKET(S): at 17:58

## 2021-01-20 RX ADMIN — Medication 1 APPLICATION(S): at 05:05

## 2021-01-20 RX ADMIN — ZINC SULFATE TAB 220 MG (50 MG ZINC EQUIVALENT) 220 MILLIGRAM(S): 220 (50 ZN) TAB at 11:29

## 2021-01-20 RX ADMIN — Medication 1 APPLICATION(S): at 17:58

## 2021-01-20 RX ADMIN — ENOXAPARIN SODIUM 100 MILLIGRAM(S): 100 INJECTION SUBCUTANEOUS at 05:05

## 2021-01-20 RX ADMIN — Medication 50 GRAM(S): at 13:10

## 2021-01-20 RX ADMIN — CHLORHEXIDINE GLUCONATE 1 APPLICATION(S): 213 SOLUTION TOPICAL at 12:00

## 2021-01-20 RX ADMIN — Medication 50 MILLIGRAM(S): at 05:05

## 2021-01-20 RX ADMIN — Medication 500 MILLIGRAM(S): at 11:29

## 2021-01-20 RX ADMIN — Medication 1 TABLET(S): at 05:05

## 2021-01-20 NOTE — PROGRESS NOTE ADULT - SUBJECTIVE AND OBJECTIVE BOX
CHIEF COMPLAINT: Patient is a 60y old  Female who presents with a chief complaint of Transfer from [facility] (19 Jan 2021 12:47)      Interval Events: Pt seen and evaluated by team     REVIEW OF SYSTEMS:  Constitutional:   Eyes:  ENT:  CV:  Resp:  GI:  :  MSK:  [ ] All other systems negative      OBJECTIVE:  ICU Vital Signs Last 24 Hrs  T(C): 36.7 (20 Jan 2021 05:00), Max: 36.7 (20 Jan 2021 05:00)  T(F): 98.1 (20 Jan 2021 05:00), Max: 98.1 (20 Jan 2021 05:00)  HR: 81 (20 Jan 2021 06:52) (72 - 109)  BP: 147/56 (20 Jan 2021 05:00) (120/60 - 147/56)  BP(mean): --  ABP: --  ABP(mean): --  RR: 23 (20 Jan 2021 05:00) (20 - 23)  SpO2: 100% (20 Jan 2021 06:52) (97% - 100%)    Mode: AC/ CMV (Assist Control/ Continuous Mandatory Ventilation), RR (machine): 12, TV (machine): 450, FiO2: 40, PEEP: 5, MAP: 14.2, PIP: 27    01-18 @ 07:01 - 01-19 @ 07:00  --------------------------------------------------------  IN: 2020 mL / OUT: 2200 mL / NET: -180 mL    01-19 @ 07:01 - 01-20 @ 06:55  --------------------------------------------------------  IN: 1600 mL / OUT: 600 mL / NET: 1000 mL      CAPILLARY BLOOD GLUCOSE      POCT Blood Glucose.: 115 mg/dL (20 Jan 2021 05:38)          HOSPITAL MEDICATIONS:  MEDICATIONS  (STANDING):  amLODIPine   Tablet 5 milliGRAM(s) Oral daily  ascorbic acid 500 milliGRAM(s) Oral daily  chlorhexidine 4% Liquid 1 Application(s) Topical daily  collagenase Ointment 1 Application(s) Topical two times a day  enoxaparin Injectable 100 milliGRAM(s) SubCutaneous every 12 hours  insulin lispro (ADMELOG) corrective regimen sliding scale   SubCutaneous every 6 hours  lactobacillus acidophilus 1 Tablet(s) Oral two times a day  metoprolol tartrate 50 milliGRAM(s) Oral two times a day  multivitamin 1 Tablet(s) Oral daily  pantoprazole  Injectable 40 milliGRAM(s) IV Push daily  psyllium Powder 1 Packet(s) Oral two times a day  zinc sulfate 220 milliGRAM(s) Oral daily    MEDICATIONS  (PRN):  acetaminophen    Suspension .. 650 milliGRAM(s) Oral every 6 hours PRN Temp greater or equal to 38C (100.4F), Mild Pain (1 - 3), Moderate Pain (4 - 6)      LABS:                        9.5    17.09 )-----------( 367      ( 20 Jan 2021 03:53 )             33.0     01-20    138  |  100  |  5<L>  ----------------------------<  142<H>  3.6   |  28  |  0.27<L>    Ca    8.9      20 Jan 2021 03:53  Phos  2.6     01-20  Mg     1.5     01-20                MICROBIOLOGY:     RADIOLOGY:  [ ] Reviewed and interpreted by me    PULMONARY FUNCTION TESTS:    EKG: CHIEF COMPLAINT: Patient is a 60y old  Female who presents with a chief complaint of Transfer from [facility] (19 Jan 2021 12:47)      Interval Events: Pt seen and evaluated by team     REVIEW OF SYSTEMS:  Constitutional: No fever chills   Eyes:  ENT: denies   CV: Denies   Resp: Denies   GI: No n/v   MSK: Pain when oob   [x ] All other systems negative      OBJECTIVE:  ICU Vital Signs Last 24 Hrs  T(C): 36.7 (20 Jan 2021 05:00), Max: 36.7 (20 Jan 2021 05:00)  T(F): 98.1 (20 Jan 2021 05:00), Max: 98.1 (20 Jan 2021 05:00)  HR: 81 (20 Jan 2021 06:52) (72 - 109)  BP: 147/56 (20 Jan 2021 05:00) (120/60 - 147/56)  BP(mean): --  ABP: --  ABP(mean): --  RR: 23 (20 Jan 2021 05:00) (20 - 23)  SpO2: 100% (20 Jan 2021 06:52) (97% - 100%)    Mode: AC/ CMV (Assist Control/ Continuous Mandatory Ventilation), RR (machine): 12, TV (machine): 450, FiO2: 40, PEEP: 5, MAP: 14.2, PIP: 27    01-18 @ 07:01 - 01-19 @ 07:00  --------------------------------------------------------  IN: 2020 mL / OUT: 2200 mL / NET: -180 mL    01-19 @ 07:01 - 01-20 @ 06:55  --------------------------------------------------------  IN: 1600 mL / OUT: 600 mL / NET: 1000 mL      CAPILLARY BLOOD GLUCOSE      POCT Blood Glucose.: 115 mg/dL (20 Jan 2021 05:38)          HOSPITAL MEDICATIONS:  MEDICATIONS  (STANDING):  amLODIPine   Tablet 5 milliGRAM(s) Oral daily  ascorbic acid 500 milliGRAM(s) Oral daily  chlorhexidine 4% Liquid 1 Application(s) Topical daily  collagenase Ointment 1 Application(s) Topical two times a day  enoxaparin Injectable 100 milliGRAM(s) SubCutaneous every 12 hours  insulin lispro (ADMELOG) corrective regimen sliding scale   SubCutaneous every 6 hours  lactobacillus acidophilus 1 Tablet(s) Oral two times a day  metoprolol tartrate 50 milliGRAM(s) Oral two times a day  multivitamin 1 Tablet(s) Oral daily  pantoprazole  Injectable 40 milliGRAM(s) IV Push daily  psyllium Powder 1 Packet(s) Oral two times a day  zinc sulfate 220 milliGRAM(s) Oral daily    MEDICATIONS  (PRN):  acetaminophen    Suspension .. 650 milliGRAM(s) Oral every 6 hours PRN Temp greater or equal to 38C (100.4F), Mild Pain (1 - 3), Moderate Pain (4 - 6)      LABS:                        9.5    17.09 )-----------( 367      ( 20 Jan 2021 03:53 )             33.0     01-20    138  |  100  |  5<L>  ----------------------------<  142<H>  3.6   |  28  |  0.27<L>    Ca    8.9      20 Jan 2021 03:53  Phos  2.6     01-20  Mg     1.5     01-20                MICROBIOLOGY:     RADIOLOGY:  [ ] Reviewed and interpreted by me    PULMONARY FUNCTION TESTS:    EKG:

## 2021-01-20 NOTE — PROGRESS NOTE ADULT - ASSESSMENT
59 YO F with PMHx of HTN, DVT on Xarelto, Peritonitis s/p Oral's Procedure and Ileostomy (reversed in 2011) and AARON who presented to [facility] on 11/18 for AHRF second to COVID 19, intubated on 11/23 complicated by lung abscesses on CT CHEST 12/5, multi-bacterial PNA and bacteremia, ARTEMIO s/p CRRT and HD, and s/p Tracheostomy 12/18. Transferred to RCU for further management.     # AMS   - AOX3 at baseline.   - CTH and EEG WNL   - Now weaned off all sedation.   - Monitor mentation and supportive care   - More alert/follows commands     # ARDS second to COVID vs Multi-bacterial PNA/ R Lung Abscess  - Intubated 11/23 and course complicated with multi-bacterial PNA and lung abscesses.   - Lung abscess presented on CT CHEST 12/5  - s/p Tracheostomy on 12/18 by CTSx. Sutures out 1/1/2021  - Air leak noted and s/p Trach Exchange for Bivona with CTSx 12/31  - Continue on AC vent and PS as tolerated.   - Proventil, Chest PT and Suction PRN. Trach Care QD.     # Vasoplegic vs Septic Shock   - Weaned off pressors. Monitor BP on Norvasc and Metoprolol.   - On full AC for Hx of DVT. CTA CHEST with no PEs.     # ARTEMIO s/p CRRT and HD   - ARTEMIO now resolved and no longer required HD.   - Monitor renal function and avoid nephrotoxins.     # Dysphagia   - Initially CTSx and GI deferred PEG given Ventral Hernia   - s/p PEG placement with IR 1/11   - Nauseous episodes noted and s/p Reglan PRN. Continue on TF and monitor tolerance.   - No new sx /tolerating feeds     # Sepsis second to COVID vs Multi-bacterial PNA/ Bacteremia and R Lung Abscess   - COVID with superimposed multi-bacterial VAP vs aspiration PNA vs cavitary PNA.   - SCx (11/24) MSSA and BCx (11/27) with MSSA and Proteus Bacteremia  - SCx (12/1, 12/12 and 12/27) with Stenotrophomonas.   - SCx (1/9) with  Pseudomonas and Stenotrophomonas   - BCX has been persistently negative from 12/1, and most recently 1/12   - CT CHEST 12/4 with right lung cavitation.   - s/p multiple antibiotics, but now on Fortaz and Flagyl (1/12-1/16)   - Monitor off antibiotics.     # DM2   - Continue on ISS and NPH and monitor FS.   - Adjust insulin as needed.     # Hx of DVT   - Continue on Lovenox FULL DOSE for Xarelto for now.     # Skin/ Facial Wounds   - WOC recommendations appreciated   - Plastics evaluation and recommendations of facial wound appreciated     # DVT/ GI PPX with FULL AC/ PPI   # CODE STATUS - FULL CODE   # DISPO - RCU    61 YO F with PMHx of HTN, DVT on Xarelto, Peritonitis s/p Oral's Procedure and Ileostomy (reversed in 2011) and AARON who presented to [facility] on 11/18 for AHRF second to COVID 19, intubated on 11/23 complicated by lung abscesses on CT CHEST 12/5, multi-bacterial PNA and bacteremia, ARTEMIO s/p CRRT and HD, and s/p Tracheostomy 12/18. Transferred to RCU for further management.     # AMS   - AOX3 at baseline.   - CTH and EEG WNL   - Now weaned off all sedation.   - Monitor mentation and supportive care   - More alert/follows commands   - Asking to be oob to chair    # ARDS second to COVID vs Multi-bacterial PNA/ R Lung Abscess  - Intubated 11/23 and course complicated with multi-bacterial PNA and lung abscesses.   - Lung abscess presented on CT CHEST 12/5  - s/p Tracheostomy on 12/18 by CTSx. Sutures out 1/1/2021  - Air leak noted and s/p Trach Exchange for Bivona with CTSx 12/31  - Continue on AC vent and PS as tolerated.   - Proventil, Chest PT and Suction PRN. Trach Care QD.     # Vasoplegic vs Septic Shock   - Weaned off pressors. Monitor BP on Norvasc and Metoprolol.   - On full AC for Hx of DVT. CTA CHEST with no PEs.     # ARTEMIO s/p CRRT and HD   - ARTEMIO now resolved and no longer required HD.   - Monitor renal function and avoid nephrotoxins.     # Dysphagia   - Initially CTSx and GI deferred PEG given Ventral Hernia   - s/p PEG placement with IR 1/11   - Nauseous episodes noted and s/p Reglan PRN. Continue on TF and monitor tolerance.   - No new sx /tolerating feeds     # Sepsis second to COVID vs Multi-bacterial PNA/ Bacteremia and R Lung Abscess   - COVID with superimposed multi-bacterial VAP vs aspiration PNA vs cavitary PNA.   - SCx (11/24) MSSA and BCx (11/27) with MSSA and Proteus Bacteremia  - SCx (12/1, 12/12 and 12/27) with Stenotrophomonas.   - SCx (1/9) with  Pseudomonas and Stenotrophomonas   - BCX has been persistently negative from 12/1, and most recently 1/12   - CT CHEST 12/4 with right lung cavitation.   - s/p multiple antibiotics, but now on Fortaz and Flagyl (1/12-1/16)   - Monitor off antibiotics.   - WBC elevated - no new fevers/follow up cbc in am     # DM2   - Continue on ISS and NPH and monitor FS.   - Adjust insulin as needed.     # Hx of DVT   - Continue on Lovenox FULL DOSE for Xarelto for now.     # Skin/ Facial Wounds   - WOC recommendations appreciated   - Plastics evaluation and recommendations of facial wound appreciated     # DVT/ GI PPX with FULL AC/ PPI   # CODE STATUS - FULL CODE   # DISPO - RCU

## 2021-01-20 NOTE — CHART NOTE - NSCHARTNOTEFT_GEN_A_CORE
Pt had trach change at bedside with Pulm attending Dr. Padilla .  Pt anxious and ativan 2mg iv given x2 - trach changed to #6 shiley cuffed balloon deflated on trach collar   bp /hr stable pulse ox 100%.  Spoke with Vascular surgery resident regarding above

## 2021-01-20 NOTE — PROGRESS NOTE ADULT - ATTENDING COMMENTS
60F with PMHX as above presenting to [facility] for hypoxemic respiratory failure with ARDS 2/2 COVID19 PNA further c/b superimposed MSSA cavitary PNA and ARF requiring HD. Pt with failure to wean from mechanical ventilation, s/p tracheostomy placement 12/18 and transfer to RCU 12/28. Clinically, the patient is doing well, awake and alert following commands. Cont daily SBTs as tolerated. Cont airway clearance therapy and trach care as per RCU team. Patient now afebrile, off antibiotics. Leukocytosis, unclear etiolgy, will cont to monitor. Pt with oropharyngeal dysphagia, IR guided PEG successfully placed 1/11. Pt tolerating PEG feeds. Pt also initially with ARF on CRRT, following by intermittent HD (last session 12/17) with renal recovery. Dispo pending medical optimization.

## 2021-01-20 NOTE — CHART NOTE - NSCHARTNOTEFT_GEN_A_CORE
Source: Extensive chart review , medical team     Diet : NPO with Tube Feed   - Tube Feeding Modality: Gastrostomy Glucerna 1.5 Pelon (GLUCERNA1.5RTH) Total Volume for 24 Hours (mL): 840 Continuous Starting Tube Feed Rate {mL per Hour}: 10 Increase Tube Feed Rate by (mL): 10 Every 4 hours Until Goal Tube Feed Rate (mL per Hour): 35 Tube Feed Duration (in Hours): 24 Tube Feed Start Time: 18:00 No Carb Prosource (1pkg = 15gms Protein) Qty per Day: 3     EN will provide 1260 kcal & 114 gm protein/d . Pt. receiving additional free water boluses 400 ml every 6 hrs 1600 ml/d .     Patient alert, w/ tracheostomy , unable to speak, tolerating EN , wt. favorably trending down , still above IBW , current EN will provide optimal nutrition . No edema noted, per flow sheet pt. w/ multiple pressure ulcers .     Current Weight:  113 kg on 1/20/2021; BMI - 44.1 ; IBW - 52.2 kg     Pertinent Medications: MEDICATIONS  (STANDING):  amLODIPine   Tablet 5 milliGRAM(s) Oral daily  ascorbic acid 500 milliGRAM(s) Oral daily  chlorhexidine 4% Liquid 1 Application(s) Topical daily  collagenase Ointment 1 Application(s) Topical two times a day  enoxaparin Injectable 100 milliGRAM(s) SubCutaneous every 12 hours  insulin lispro (ADMELOG) corrective regimen sliding scale   SubCutaneous every 6 hours  lactobacillus acidophilus 1 Tablet(s) Oral two times a day  magnesium sulfate  IVPB 2 Gram(s) IV Intermittent once  metoprolol tartrate 50 milliGRAM(s) Oral two times a day  multivitamin 1 Tablet(s) Oral daily  pantoprazole  Injectable 40 milliGRAM(s) IV Push daily  psyllium Powder 1 Packet(s) Oral two times a day  zinc sulfate 220 milliGRAM(s) Oral daily      Estimated Needs:  1243 - 1582 kcal/d ( 11-14 kcal/kg wt. ) protein 104 gm/d ( 2.0 gm /kg IBW )     Recommend EN per order , free water per MD discretion .     Monitoring and Evaluation:  Tolerance to diet prescription , weights & follow up per protocol

## 2021-01-21 LAB
ANION GAP SERPL CALC-SCNC: 11 MMOL/L — SIGNIFICANT CHANGE UP (ref 7–14)
BUN SERPL-MCNC: 8 MG/DL — SIGNIFICANT CHANGE UP (ref 7–23)
CALCIUM SERPL-MCNC: 9.2 MG/DL — SIGNIFICANT CHANGE UP (ref 8.4–10.5)
CHLORIDE SERPL-SCNC: 99 MMOL/L — SIGNIFICANT CHANGE UP (ref 98–107)
CO2 SERPL-SCNC: 28 MMOL/L — SIGNIFICANT CHANGE UP (ref 22–31)
CREAT SERPL-MCNC: 0.23 MG/DL — LOW (ref 0.5–1.3)
GLUCOSE BLDC GLUCOMTR-MCNC: 104 MG/DL — HIGH (ref 70–99)
GLUCOSE BLDC GLUCOMTR-MCNC: 105 MG/DL — HIGH (ref 70–99)
GLUCOSE BLDC GLUCOMTR-MCNC: 106 MG/DL — HIGH (ref 70–99)
GLUCOSE BLDC GLUCOMTR-MCNC: 125 MG/DL — HIGH (ref 70–99)
GLUCOSE BLDC GLUCOMTR-MCNC: 153 MG/DL — HIGH (ref 70–99)
GLUCOSE SERPL-MCNC: 120 MG/DL — HIGH (ref 70–99)
MAGNESIUM SERPL-MCNC: 1.7 MG/DL — SIGNIFICANT CHANGE UP (ref 1.6–2.6)
PHOSPHATE SERPL-MCNC: 4 MG/DL — SIGNIFICANT CHANGE UP (ref 2.5–4.5)
POTASSIUM SERPL-MCNC: 3.9 MMOL/L — SIGNIFICANT CHANGE UP (ref 3.5–5.3)
POTASSIUM SERPL-SCNC: 3.9 MMOL/L — SIGNIFICANT CHANGE UP (ref 3.5–5.3)
SODIUM SERPL-SCNC: 138 MMOL/L — SIGNIFICANT CHANGE UP (ref 135–145)

## 2021-01-21 PROCEDURE — 99233 SBSQ HOSP IP/OBS HIGH 50: CPT

## 2021-01-21 RX ADMIN — Medication 1 APPLICATION(S): at 05:49

## 2021-01-21 RX ADMIN — Medication 1 PACKET(S): at 05:49

## 2021-01-21 RX ADMIN — Medication 1 TABLET(S): at 18:58

## 2021-01-21 RX ADMIN — Medication 1 PACKET(S): at 18:59

## 2021-01-21 RX ADMIN — ZINC SULFATE TAB 220 MG (50 MG ZINC EQUIVALENT) 220 MILLIGRAM(S): 220 (50 ZN) TAB at 14:06

## 2021-01-21 RX ADMIN — Medication 15 MILLILITER(S): at 14:06

## 2021-01-21 RX ADMIN — Medication 1 APPLICATION(S): at 19:00

## 2021-01-21 RX ADMIN — Medication 1 TABLET(S): at 05:49

## 2021-01-21 RX ADMIN — ENOXAPARIN SODIUM 100 MILLIGRAM(S): 100 INJECTION SUBCUTANEOUS at 18:58

## 2021-01-21 RX ADMIN — ENOXAPARIN SODIUM 100 MILLIGRAM(S): 100 INJECTION SUBCUTANEOUS at 05:49

## 2021-01-21 RX ADMIN — PANTOPRAZOLE SODIUM 40 MILLIGRAM(S): 20 TABLET, DELAYED RELEASE ORAL at 14:09

## 2021-01-21 RX ADMIN — Medication 50 MILLIGRAM(S): at 18:59

## 2021-01-21 RX ADMIN — CHLORHEXIDINE GLUCONATE 1 APPLICATION(S): 213 SOLUTION TOPICAL at 13:21

## 2021-01-21 RX ADMIN — AMLODIPINE BESYLATE 5 MILLIGRAM(S): 2.5 TABLET ORAL at 05:49

## 2021-01-21 RX ADMIN — Medication 650 MILLIGRAM(S): at 14:06

## 2021-01-21 RX ADMIN — Medication 50 MILLIGRAM(S): at 05:49

## 2021-01-21 RX ADMIN — Medication 500 MILLIGRAM(S): at 14:09

## 2021-01-21 RX ADMIN — Medication 2: at 12:50

## 2021-01-21 NOTE — PROGRESS NOTE ADULT - ATTENDING COMMENTS
60F with PMHX as above presenting to [facility] for hypoxemic respiratory failure with ARDS 2/2 COVID19 PNA further c/b superimposed MSSA cavitary PNA and ARF requiring HD. Pt with failure to wean from mechanical ventilation, s/p tracheostomy placement 12/18 and transfer to RCU 12/28. Clinically, the patient is doing well, awake and alert following commands. Cont daily SBTs as tolerated--tolerateing well. Cont airway clearance therapy and trach care as per RCU team. Patient now afebrile, off antibiotics. Leukocytosis, unclear etiolgy, follow up CBC this AM. Pt with oropharyngeal dysphagia, IR guided PEG successfully placed 1/11. Pt tolerating PEG feeds. Pt also initially with ARF on CRRT, following by intermittent HD (last session 12/17) with renal recovery. Dispo pending medical optimization.

## 2021-01-21 NOTE — PROGRESS NOTE ADULT - SUBJECTIVE AND OBJECTIVE BOX
CHIEF COMPLAINT: Patient is a 60y old Female who presents with a chief complaint of Transfer from [facility].    Interval Events:    REVIEW OF SYSTEMS:  [ ] All other systems negative  [ ] Unable to assess ROS because ________    OBJECTIVE:  ICU Vital Signs Last 24 Hrs  T(C): 36.8 (21 Jan 2021 05:47), Max: 36.8 (21 Jan 2021 05:47)  T(F): 98.3 (21 Jan 2021 05:47), Max: 98.3 (21 Jan 2021 05:47)  HR: 88 (21 Jan 2021 06:56) (82 - 116)  BP: 132/55 (21 Jan 2021 05:47) (132/55 - 135/58)  RR: 21 (21 Jan 2021 05:47) (21 - 22)  SpO2: 99% (21 Jan 2021 06:56) (68% - 100%)    Mode: AC/ CMV (Assist Control/ Continuous Mandatory Ventilation), RR (machine): 12, TV (machine): 450, FiO2: 40, PEEP: 5, MAP: 14.7, PIP: 30    01-20 @ 07:01  -  01-21 @ 07:00  --------------------------------------------------------  IN: 1220 mL / OUT: 900 mL / NET: 320 mL      CAPILLARY BLOOD GLUCOSE      POCT Blood Glucose.: 125 mg/dL (21 Jan 2021 05:00)      HOSPITAL MEDICATIONS:  MEDICATIONS  (STANDING):  amLODIPine   Tablet 5 milliGRAM(s) Oral daily  ascorbic acid 500 milliGRAM(s) Oral daily  chlorhexidine 4% Liquid 1 Application(s) Topical daily  collagenase Ointment 1 Application(s) Topical two times a day  enoxaparin Injectable 100 milliGRAM(s) SubCutaneous every 12 hours  insulin lispro (ADMELOG) corrective regimen sliding scale   SubCutaneous every 6 hours  lactobacillus acidophilus 1 Tablet(s) Oral two times a day  metoprolol tartrate 50 milliGRAM(s) Oral two times a day  multivitamin/minerals/iron Oral Solution (CENTRUM) 15 milliLiter(s) Oral daily  pantoprazole  Injectable 40 milliGRAM(s) IV Push daily  psyllium Powder 1 Packet(s) Oral two times a day  zinc sulfate 220 milliGRAM(s) Oral daily    MEDICATIONS  (PRN):  acetaminophen    Suspension .. 650 milliGRAM(s) Oral every 6 hours PRN Temp greater or equal to 38C (100.4F), Mild Pain (1 - 3), Moderate Pain (4 - 6)      LABS:                   01-21    138  |  99  |  8   ----------------------------<  120<H>  3.9   |  28  |  0.23<L>    Ca    9.2      21 Jan 2021 06:29  Phos  4.0     01-21  Mg     1.7     01-21        MICROBIOLOGY:     RADIOLOGY:  [ ] Reviewed and interpreted by me    PULMONARY FUNCTION TESTS:    EKG: CHIEF COMPLAINT: Patient is a 60y old Female who presents with a chief complaint of Transfer from [facility].    Interval Events: No interval events noted overnight.     REVIEW OF SYSTEMS:  Denies fever, SOB, CP, abd pain, N/V  [x] All other systems negative      OBJECTIVE:  ICU Vital Signs Last 24 Hrs  T(C): 36.8 (21 Jan 2021 05:47), Max: 36.8 (21 Jan 2021 05:47)  T(F): 98.3 (21 Jan 2021 05:47), Max: 98.3 (21 Jan 2021 05:47)  HR: 88 (21 Jan 2021 06:56) (82 - 116)  BP: 132/55 (21 Jan 2021 05:47) (132/55 - 135/58)  RR: 21 (21 Jan 2021 05:47) (21 - 22)  SpO2: 99% (21 Jan 2021 06:56) (68% - 100%)    Mode: AC/ CMV (Assist Control/ Continuous Mandatory Ventilation), RR (machine): 12, TV (machine): 450, FiO2: 40, PEEP: 5, MAP: 14.7, PIP: 30    01-20 @ 07:01  -  01-21 @ 07:00  --------------------------------------------------------  IN: 1220 mL / OUT: 900 mL / NET: 320 mL      CAPILLARY BLOOD GLUCOSE      POCT Blood Glucose.: 125 mg/dL (21 Jan 2021 05:00)      HOSPITAL MEDICATIONS:  MEDICATIONS  (STANDING):  amLODIPine   Tablet 5 milliGRAM(s) Oral daily  ascorbic acid 500 milliGRAM(s) Oral daily  chlorhexidine 4% Liquid 1 Application(s) Topical daily  collagenase Ointment 1 Application(s) Topical two times a day  enoxaparin Injectable 100 milliGRAM(s) SubCutaneous every 12 hours  insulin lispro (ADMELOG) corrective regimen sliding scale   SubCutaneous every 6 hours  lactobacillus acidophilus 1 Tablet(s) Oral two times a day  metoprolol tartrate 50 milliGRAM(s) Oral two times a day  multivitamin/minerals/iron Oral Solution (CENTRUM) 15 milliLiter(s) Oral daily  pantoprazole  Injectable 40 milliGRAM(s) IV Push daily  psyllium Powder 1 Packet(s) Oral two times a day  zinc sulfate 220 milliGRAM(s) Oral daily    MEDICATIONS  (PRN):  acetaminophen    Suspension .. 650 milliGRAM(s) Oral every 6 hours PRN Temp greater or equal to 38C (100.4F), Mild Pain (1 - 3), Moderate Pain (4 - 6)      LABS:                   01-21    138  |  99  |  8   ----------------------------<  120<H>  3.9   |  28  |  0.23<L>    Ca    9.2      21 Jan 2021 06:29  Phos  4.0     01-21  Mg     1.7     01-21        MICROBIOLOGY:     RADIOLOGY:  [ ] Reviewed and interpreted by me    PULMONARY FUNCTION TESTS:    EKG:

## 2021-01-21 NOTE — PROGRESS NOTE ADULT - ASSESSMENT
61 YO F with PMHx of HTN, DVT on Xarelto, Peritonitis s/p Oral's Procedure and Ileostomy (reversed in 2011) and AARON who presented to [facility] on 11/18 for AHRF second to COVID 19, intubated on 11/23 complicated by lung abscesses on CT CHEST 12/5, multi-bacterial PNA and bacteremia, ARTEMIO s/p CRRT and HD, and s/p Tracheostomy 12/18. Transferred to RCU for further management.     # AMS   - AOX3 at baseline.   - CTH and EEG WNL   - Now weaned off all sedation.   - Monitor mentation and supportive care   - More alert/follows commands   - Asking to be oob to chair    # ARDS second to COVID vs Multi-bacterial PNA/ R Lung Abscess  - Intubated 11/23 and course complicated with multi-bacterial PNA and lung abscesses.   - Lung abscess presented on CT CHEST 12/5  - s/p Tracheostomy on 12/18 by CTSx. Sutures out 1/1/2021  - Air leak noted and s/p Trach Exchange for Bivona with CTSx 12/31  - Continue on AC vent and PS as tolerated.   - Proventil, Chest PT and Suction PRN. Trach Care QD.     # Vasoplegic vs Septic Shock   - Weaned off pressors. Monitor BP on Norvasc and Metoprolol.   - On full AC for Hx of DVT. CTA CHEST with no PEs.     # ARTEMIO s/p CRRT and HD   - ARTEMIO now resolved and no longer required HD.   - Monitor renal function and avoid nephrotoxins.     # Dysphagia   - Initially CTSx and GI deferred PEG given Ventral Hernia   - s/p PEG placement with IR 1/11   - Nauseous episodes noted and s/p Reglan PRN. Continue on TF and monitor tolerance.   - No new sx /tolerating feeds     # Sepsis second to COVID vs Multi-bacterial PNA/ Bacteremia and R Lung Abscess   - COVID with superimposed multi-bacterial VAP vs aspiration PNA vs cavitary PNA.   - SCx (11/24) MSSA and BCx (11/27) with MSSA and Proteus Bacteremia  - SCx (12/1, 12/12 and 12/27) with Stenotrophomonas.   - SCx (1/9) with  Pseudomonas and Stenotrophomonas   - BCX has been persistently negative from 12/1, and most recently 1/12   - CT CHEST 12/4 with right lung cavitation.   - s/p multiple antibiotics, but now on Fortaz and Flagyl (1/12-1/16)   - Monitor off antibiotics.   - WBC elevated - no new fevers/follow up cbc in am     # DM2   - Continue on ISS and NPH and monitor FS.   - Adjust insulin as needed.     # Hx of DVT   - Continue on Lovenox FULL DOSE for Xarelto for now.     # Skin/ Facial Wounds   - WOC recommendations appreciated   - Plastics evaluation and recommendations of facial wound appreciated     # DVT/ GI PPX with FULL AC/ PPI   # CODE STATUS - FULL CODE   # DISPO - RCU    59 YO F with PMHx of HTN, DVT on Xarelto, Peritonitis s/p Oral's Procedure and Ileostomy (reversed in 2011) and AARON who presented to [facility] on 11/18 for AHRF second to COVID 19, intubated on 11/23 complicated by lung abscesses on CT CHEST 12/5, multi-bacterial PNA and bacteremia, ARTEMIO s/p CRRT and HD, and s/p Tracheostomy 12/18. Transferred to RCU for further management.     # AMS   - AOX3 at baseline.   - CTH and EEG WNL   - Now weaned off all sedation.   - Monitor mentation and supportive care   - More alert/follows commands   - Asking to be oob to chair    # ARDS second to COVID vs Multi-bacterial PNA/ R Lung Abscess  - Intubated 11/23 and course complicated with multi-bacterial PNA and lung abscesses.   - Lung abscess presented on CT CHEST 12/5  - s/p Tracheostomy on 12/18 by CTSx. Sutures out 1/1/2021  - Air leak noted and s/p Trach Exchange for Bivona with CTSx 12/31  - Continue on AC vent and PS as tolerated. Tolerated PS for 1.5 hrs today 1/21  - Proventil, Chest PT and Suction PRN. Trach Care QD.     # Vasoplegic vs Septic Shock   - Weaned off pressors. Monitor BP on Norvasc and Metoprolol.   - On full AC for Hx of DVT. CTA CHEST with no PEs.     # ARTEMIO s/p CRRT and HD   - ARTEMIO now resolved and no longer required HD.   - Monitor renal function and avoid nephrotoxins.     # Dysphagia   - Initially CTSx and GI deferred PEG given Ventral Hernia   - s/p PEG placement with IR 1/11   - Nauseous episodes noted and s/p Reglan PRN. Continue on TF and monitor tolerance.   - No new sx /tolerating feeds     # Sepsis second to COVID vs Multi-bacterial PNA/ Bacteremia and R Lung Abscess   - COVID with superimposed multi-bacterial VAP vs aspiration PNA vs cavitary PNA.   - SCx (11/24) MSSA and BCx (11/27) with MSSA and Proteus Bacteremia  - SCx (12/1, 12/12 and 12/27) with Stenotrophomonas.   - SCx (1/9) with  Pseudomonas and Stenotrophomonas   - BCX has been persistently negative from 12/1, and most recently 1/12   - CT CHEST 12/4 with right lung cavitation.   - s/p multiple antibiotics, but now on Fortaz and Flagyl (1/12-1/16)   - Monitor off antibiotics.   - WBC elevated - no new fevers/follow up cbc in am     # DM2   - Continue on ISS and NPH and monitor FS.   - Adjust insulin as needed.     # Hx of DVT   - Continue on Lovenox FULL DOSE for Xarelto for now.     # Skin/ Facial Wounds   - WOC recommendations appreciated   - Plastics evaluation and recommendations of facial wound appreciated     # DVT/ GI PPX with FULL AC/ PPI   # CODE STATUS - FULL CODE   # DISPO - RCU

## 2021-01-22 LAB
ANION GAP SERPL CALC-SCNC: 11 MMOL/L — SIGNIFICANT CHANGE UP (ref 7–14)
BASOPHILS # BLD AUTO: 0.08 K/UL — SIGNIFICANT CHANGE UP (ref 0–0.2)
BASOPHILS NFR BLD AUTO: 0.5 % — SIGNIFICANT CHANGE UP (ref 0–2)
BUN SERPL-MCNC: 8 MG/DL — SIGNIFICANT CHANGE UP (ref 7–23)
CALCIUM SERPL-MCNC: 9.2 MG/DL — SIGNIFICANT CHANGE UP (ref 8.4–10.5)
CHLORIDE SERPL-SCNC: 100 MMOL/L — SIGNIFICANT CHANGE UP (ref 98–107)
CO2 SERPL-SCNC: 27 MMOL/L — SIGNIFICANT CHANGE UP (ref 22–31)
CREAT SERPL-MCNC: 0.22 MG/DL — LOW (ref 0.5–1.3)
EOSINOPHIL # BLD AUTO: 0.61 K/UL — HIGH (ref 0–0.5)
EOSINOPHIL NFR BLD AUTO: 4.1 % — SIGNIFICANT CHANGE UP (ref 0–6)
GLUCOSE BLDC GLUCOMTR-MCNC: 112 MG/DL — HIGH (ref 70–99)
GLUCOSE BLDC GLUCOMTR-MCNC: 118 MG/DL — HIGH (ref 70–99)
GLUCOSE BLDC GLUCOMTR-MCNC: 123 MG/DL — HIGH (ref 70–99)
GLUCOSE BLDC GLUCOMTR-MCNC: 123 MG/DL — HIGH (ref 70–99)
GLUCOSE SERPL-MCNC: 126 MG/DL — HIGH (ref 70–99)
HCT VFR BLD CALC: 34.6 % — SIGNIFICANT CHANGE UP (ref 34.5–45)
HGB BLD-MCNC: 9.9 G/DL — LOW (ref 11.5–15.5)
IANC: 10.82 K/UL — HIGH (ref 1.5–8.5)
IMM GRANULOCYTES NFR BLD AUTO: 0.6 % — SIGNIFICANT CHANGE UP (ref 0–1.5)
LYMPHOCYTES # BLD AUTO: 15.1 % — SIGNIFICANT CHANGE UP (ref 13–44)
LYMPHOCYTES # BLD AUTO: 2.25 K/UL — SIGNIFICANT CHANGE UP (ref 1–3.3)
MAGNESIUM SERPL-MCNC: 1.4 MG/DL — LOW (ref 1.6–2.6)
MCHC RBC-ENTMCNC: 28 PG — SIGNIFICANT CHANGE UP (ref 27–34)
MCHC RBC-ENTMCNC: 28.6 GM/DL — LOW (ref 32–36)
MCV RBC AUTO: 97.7 FL — SIGNIFICANT CHANGE UP (ref 80–100)
MONOCYTES # BLD AUTO: 1.06 K/UL — HIGH (ref 0–0.9)
MONOCYTES NFR BLD AUTO: 7.1 % — SIGNIFICANT CHANGE UP (ref 2–14)
NEUTROPHILS # BLD AUTO: 10.82 K/UL — HIGH (ref 1.8–7.4)
NEUTROPHILS NFR BLD AUTO: 72.6 % — SIGNIFICANT CHANGE UP (ref 43–77)
NRBC # BLD: 0 /100 WBCS — SIGNIFICANT CHANGE UP
NRBC # FLD: 0 K/UL — SIGNIFICANT CHANGE UP
PHOSPHATE SERPL-MCNC: 3.9 MG/DL — SIGNIFICANT CHANGE UP (ref 2.5–4.5)
PLATELET # BLD AUTO: 338 K/UL — SIGNIFICANT CHANGE UP (ref 150–400)
POTASSIUM SERPL-MCNC: 4.1 MMOL/L — SIGNIFICANT CHANGE UP (ref 3.5–5.3)
POTASSIUM SERPL-SCNC: 4.1 MMOL/L — SIGNIFICANT CHANGE UP (ref 3.5–5.3)
RBC # BLD: 3.54 M/UL — LOW (ref 3.8–5.2)
RBC # FLD: 17.6 % — HIGH (ref 10.3–14.5)
SODIUM SERPL-SCNC: 138 MMOL/L — SIGNIFICANT CHANGE UP (ref 135–145)
WBC # BLD: 14.91 K/UL — HIGH (ref 3.8–10.5)
WBC # FLD AUTO: 14.91 K/UL — HIGH (ref 3.8–10.5)

## 2021-01-22 PROCEDURE — 99233 SBSQ HOSP IP/OBS HIGH 50: CPT

## 2021-01-22 RX ORDER — MAGNESIUM SULFATE 500 MG/ML
1 VIAL (ML) INJECTION ONCE
Refills: 0 | Status: COMPLETED | OUTPATIENT
Start: 2021-01-22 | End: 2021-01-22

## 2021-01-22 RX ADMIN — ENOXAPARIN SODIUM 100 MILLIGRAM(S): 100 INJECTION SUBCUTANEOUS at 06:08

## 2021-01-22 RX ADMIN — ENOXAPARIN SODIUM 100 MILLIGRAM(S): 100 INJECTION SUBCUTANEOUS at 18:03

## 2021-01-22 RX ADMIN — Medication 100 GRAM(S): at 23:02

## 2021-01-22 RX ADMIN — Medication 1 PACKET(S): at 18:03

## 2021-01-22 RX ADMIN — Medication 1 TABLET(S): at 18:03

## 2021-01-22 RX ADMIN — Medication 1 APPLICATION(S): at 06:08

## 2021-01-22 RX ADMIN — Medication 15 MILLILITER(S): at 11:34

## 2021-01-22 RX ADMIN — Medication 50 MILLIGRAM(S): at 18:01

## 2021-01-22 RX ADMIN — Medication 1 APPLICATION(S): at 18:03

## 2021-01-22 RX ADMIN — CHLORHEXIDINE GLUCONATE 1 APPLICATION(S): 213 SOLUTION TOPICAL at 11:35

## 2021-01-22 RX ADMIN — Medication 1 PACKET(S): at 06:07

## 2021-01-22 RX ADMIN — Medication 50 MILLIGRAM(S): at 06:12

## 2021-01-22 RX ADMIN — ZINC SULFATE TAB 220 MG (50 MG ZINC EQUIVALENT) 220 MILLIGRAM(S): 220 (50 ZN) TAB at 11:34

## 2021-01-22 RX ADMIN — Medication 1 TABLET(S): at 06:07

## 2021-01-22 RX ADMIN — Medication 500 MILLIGRAM(S): at 11:34

## 2021-01-22 RX ADMIN — Medication 650 MILLIGRAM(S): at 07:09

## 2021-01-22 RX ADMIN — PANTOPRAZOLE SODIUM 40 MILLIGRAM(S): 20 TABLET, DELAYED RELEASE ORAL at 11:35

## 2021-01-22 RX ADMIN — AMLODIPINE BESYLATE 5 MILLIGRAM(S): 2.5 TABLET ORAL at 06:07

## 2021-01-22 NOTE — PROGRESS NOTE ADULT - ATTENDING COMMENTS
60F with PMHX as above presenting to [facility] for hypoxemic respiratory failure with ARDS 2/2 COVID19 PNA further c/b superimposed MSSA cavitary PNA and ARF requiring HD. Pt with failure to wean from mechanical ventilation, s/p tracheostomy placement 12/18 and transfer to RCU 12/28. Clinically, the patient is doing well, awake and alert following commands. Cont daily SBTs, tolerating well. Cont airway clearance therapy and trach care as per RCU team. Patient now afebrile, off antibiotics. Leukocytosis, unclear etiology, improved, cont to trend CBC. Pt with oropharyngeal dysphagia, IR guided PEG successfully placed 1/11. Pt tolerating PEG feeds. Pt also initially with ARF on CRRT, following by intermittent HD (last session 12/17) with renal recovery. Dispo pending medical optimization.

## 2021-01-22 NOTE — PROGRESS NOTE ADULT - SUBJECTIVE AND OBJECTIVE BOX
CHIEF COMPLAINT: Patient is a 60y old  Female who presents with a chief complaint of Transfer from [facility].    Interval Events:    REVIEW OF SYSTEMS:  [ ] All other systems negative  [ ] Unable to assess ROS because ________    OBJECTIVE:  ICU Vital Signs Last 24 Hrs  T(C): 36.6 (21 Jan 2021 21:18), Max: 36.7 (21 Jan 2021 13:17)  T(F): 97.9 (21 Jan 2021 21:18), Max: 98 (21 Jan 2021 13:17)  HR: 88 (22 Jan 2021 07:06) (87 - 104)  BP: 141/54 (22 Jan 2021 06:05) (130/57 - 141/54)  RR: 25 (21 Jan 2021 21:18) (19 - 25)  SpO2: 100% (22 Jan 2021 07:06) (99% - 100%)    Mode: AC/ CMV (Assist Control/ Continuous Mandatory Ventilation), RR (machine): 12, TV (machine): 450, FiO2: 40, PEEP: 5, MAP: 15.6, PIP: 34    01-21 @ 07:01  -  01-22 @ 07:00  --------------------------------------------------------  IN: 0 mL / OUT: 600 mL / NET: -600 mL      CAPILLARY BLOOD GLUCOSE      POCT Blood Glucose.: 123 mg/dL (22 Jan 2021 05:39)      HOSPITAL MEDICATIONS:  MEDICATIONS  (STANDING):  amLODIPine   Tablet 5 milliGRAM(s) Oral daily  ascorbic acid 500 milliGRAM(s) Oral daily  chlorhexidine 4% Liquid 1 Application(s) Topical daily  collagenase Ointment 1 Application(s) Topical two times a day  enoxaparin Injectable 100 milliGRAM(s) SubCutaneous every 12 hours  insulin lispro (ADMELOG) corrective regimen sliding scale   SubCutaneous every 6 hours  lactobacillus acidophilus 1 Tablet(s) Oral two times a day  metoprolol tartrate 50 milliGRAM(s) Oral two times a day  multivitamin/minerals/iron Oral Solution (CENTRUM) 15 milliLiter(s) Oral daily  pantoprazole  Injectable 40 milliGRAM(s) IV Push daily  psyllium Powder 1 Packet(s) Oral two times a day  zinc sulfate 220 milliGRAM(s) Oral daily    MEDICATIONS  (PRN):  acetaminophen    Suspension .. 650 milliGRAM(s) Oral every 6 hours PRN Temp greater or equal to 38C (100.4F), Mild Pain (1 - 3), Moderate Pain (4 - 6)      LABS:                        9.9    14.91 )-----------( 338      ( 22 Jan 2021 06:44 )             34.6     01-22    138  |  100  |  8   ----------------------------<  126<H>  4.1   |  27  |  0.22<L>    Ca    9.2      22 Jan 2021 06:44  Phos  3.9     01-22  Mg     1.4     01-22        MICROBIOLOGY:     RADIOLOGY:  [ ] Reviewed and interpreted by me    PULMONARY FUNCTION TESTS:    EKG: CHIEF COMPLAINT: Patient is a 60y old  Female who presents with a chief complaint of Transfer from [facility].    Interval Events: No interval events noted overnight.     REVIEW OF SYSTEMS:  Denies fever, chills, SOB, CP, abd pain  [x] All other systems negative    OBJECTIVE:  ICU Vital Signs Last 24 Hrs  T(C): 36.6 (21 Jan 2021 21:18), Max: 36.7 (21 Jan 2021 13:17)  T(F): 97.9 (21 Jan 2021 21:18), Max: 98 (21 Jan 2021 13:17)  HR: 88 (22 Jan 2021 07:06) (87 - 104)  BP: 141/54 (22 Jan 2021 06:05) (130/57 - 141/54)  RR: 25 (21 Jan 2021 21:18) (19 - 25)  SpO2: 100% (22 Jan 2021 07:06) (99% - 100%)    Mode: AC/ CMV (Assist Control/ Continuous Mandatory Ventilation), RR (machine): 12, TV (machine): 450, FiO2: 40, PEEP: 5, MAP: 15.6, PIP: 34    01-21 @ 07:01  -  01-22 @ 07:00  --------------------------------------------------------  IN: 0 mL / OUT: 600 mL / NET: -600 mL      CAPILLARY BLOOD GLUCOSE      POCT Blood Glucose.: 123 mg/dL (22 Jan 2021 05:39)      HOSPITAL MEDICATIONS:  MEDICATIONS  (STANDING):  amLODIPine   Tablet 5 milliGRAM(s) Oral daily  ascorbic acid 500 milliGRAM(s) Oral daily  chlorhexidine 4% Liquid 1 Application(s) Topical daily  collagenase Ointment 1 Application(s) Topical two times a day  enoxaparin Injectable 100 milliGRAM(s) SubCutaneous every 12 hours  insulin lispro (ADMELOG) corrective regimen sliding scale   SubCutaneous every 6 hours  lactobacillus acidophilus 1 Tablet(s) Oral two times a day  metoprolol tartrate 50 milliGRAM(s) Oral two times a day  multivitamin/minerals/iron Oral Solution (CENTRUM) 15 milliLiter(s) Oral daily  pantoprazole  Injectable 40 milliGRAM(s) IV Push daily  psyllium Powder 1 Packet(s) Oral two times a day  zinc sulfate 220 milliGRAM(s) Oral daily    MEDICATIONS  (PRN):  acetaminophen    Suspension .. 650 milliGRAM(s) Oral every 6 hours PRN Temp greater or equal to 38C (100.4F), Mild Pain (1 - 3), Moderate Pain (4 - 6)      LABS:                        9.9    14.91 )-----------( 338      ( 22 Jan 2021 06:44 )             34.6     01-22    138  |  100  |  8   ----------------------------<  126<H>  4.1   |  27  |  0.22<L>    Ca    9.2      22 Jan 2021 06:44  Phos  3.9     01-22  Mg     1.4     01-22        MICROBIOLOGY:     RADIOLOGY:  [ ] Reviewed and interpreted by me    PULMONARY FUNCTION TESTS:    EKG:

## 2021-01-22 NOTE — PROGRESS NOTE ADULT - ASSESSMENT
59 YO F with PMHx of HTN, DVT on Xarelto, Peritonitis s/p Oral's Procedure and Ileostomy (reversed in 2011) and AARON who presented to [facility] on 11/18 for AHRF second to COVID 19, intubated on 11/23 complicated by lung abscesses on CT CHEST 12/5, multi-bacterial PNA and bacteremia, ARTEMIO s/p CRRT and HD, and s/p Tracheostomy 12/18. Transferred to [facility] for further management.     # AMS   - AOX3 at baseline.   - CTH and EEG WNL   - Now weaned off all sedation.   - Monitor mentation and supportive care   - More alert/follows commands   - Asking to be oob to chair    # ARDS second to COVID vs Multi-bacterial PNA/ R Lung Abscess  - Intubated 11/23 and course complicated with multi-bacterial PNA and lung abscesses.   - Lung abscess presented on CT CHEST 12/5  - s/p Tracheostomy on 12/18 by CTSx. Sutures out 1/1/2021  - Air leak noted and s/p Trach Exchange for Bivona with CTSx 12/31  - Continue on AC vent and PS as tolerated. Tolerated PS for 1.5 hrs today 1/21  - Proventil, Chest PT and Suction PRN. Trach Care QD.     # Vasoplegic vs Septic Shock   - Weaned off pressors. Monitor BP on Norvasc and Metoprolol.   - On full AC for Hx of DVT. CTA CHEST with no PEs.     # ARTEMIO s/p CRRT and HD   - ARTEMIO now resolved and no longer required HD.   - Monitor renal function and avoid nephrotoxins.     # Dysphagia   - Initially CTSx and GI deferred PEG given Ventral Hernia   - s/p PEG placement with IR 1/11   - Nauseous episodes noted and s/p Reglan PRN. Continue on TF and monitor tolerance.   - No new sx /tolerating feeds     # Sepsis second to COVID vs Multi-bacterial PNA/ Bacteremia and R Lung Abscess   - COVID with superimposed multi-bacterial VAP vs aspiration PNA vs cavitary PNA.   - SCx (11/24) MSSA and BCx (11/27) with MSSA and Proteus Bacteremia  - SCx (12/1, 12/12 and 12/27) with Stenotrophomonas.   - SCx (1/9) with  Pseudomonas and Stenotrophomonas   - BCX has been persistently negative from 12/1, and most recently 1/12   - CT CHEST 12/4 with right lung cavitation.   - s/p multiple antibiotics, but now on Fortaz and Flagyl (1/12-1/16)   - Monitor off antibiotics.   - WBC elevated - no new fevers/follow up cbc in am     # DM2   - Continue on ISS and NPH and monitor FS.   - Adjust insulin as needed.     # Hx of DVT   - Continue on Lovenox FULL DOSE for Xarelto for now.     # Skin/ Facial Wounds   - WOC recommendations appreciated   - Plastics evaluation and recommendations of facial wound appreciated     # DVT/ GI PPX with FULL AC/ PPI   # CODE STATUS - FULL CODE   # DISPO - [facility]    61 YO F with PMHx of HTN, DVT on Xarelto, Peritonitis s/p Oral's Procedure and Ileostomy (reversed in 2011) and AARON who presented to [facility] on 11/18 for AHRF second to COVID 19, intubated on 11/23 complicated by lung abscesses on CT CHEST 12/5, multi-bacterial PNA and bacteremia, ARTEMIO s/p CRRT and HD, and s/p Tracheostomy 12/18. Transferred to [facility] for further management.     # AMS   - AOX3 at baseline.   - CTH and EEG WNL   - Now weaned off all sedation.   - Monitor mentation and supportive care   - More alert/follows commands   - Asking to be oob to chair    # ARDS second to COVID vs Multi-bacterial PNA/ R Lung Abscess  - Intubated 11/23 and course complicated with multi-bacterial PNA and lung abscesses.   - Lung abscess presented on CT CHEST 12/5  - s/p Tracheostomy on 12/18 by CTSx. Sutures out 1/1/2021  - Air leak noted and s/p Trach Exchange for Bivona with CTSx 12/31  - Continue on AC vent and PS as tolerated. Tolerated PS for 1 hr today 1/22  - Proventil, Chest PT and Suction PRN. Trach Care QD.     # Vasoplegic vs Septic Shock   - Weaned off pressors. Monitor BP on Norvasc and Metoprolol.   - On full AC for Hx of DVT. CTA CHEST with no PEs.     # ARTEMIO s/p CRRT and HD   - ARTEMIO now resolved and no longer required HD.   - Monitor renal function and avoid nephrotoxins.     # Dysphagia   - Initially CTSx and GI deferred PEG given Ventral Hernia   - s/p PEG placement with IR 1/11   - Nauseous episodes noted and s/p Reglan PRN. Continue on TF and monitor tolerance.   - No new sx /tolerating feeds     # Sepsis second to COVID vs Multi-bacterial PNA/ Bacteremia and R Lung Abscess   - COVID with superimposed multi-bacterial VAP vs aspiration PNA vs cavitary PNA.   - SCx (11/24) MSSA and BCx (11/27) with MSSA and Proteus Bacteremia  - SCx (12/1, 12/12 and 12/27) with Stenotrophomonas.   - SCx (1/9) with  Pseudomonas and Stenotrophomonas   - BCX has been persistently negative from 12/1, and most recently 1/12   - CT CHEST 12/4 with right lung cavitation.   - s/p multiple antibiotics, but now on Fortaz and Flagyl (1/12-1/16)   - Monitor off antibiotics.   - WBC elevated - no new fevers/follow up cbc in am     # DM2   - Continue on ISS and NPH and monitor FS.   - Adjust insulin as needed.     # Hx of DVT   - Continue on Lovenox FULL DOSE for Xarelto for now.     # Skin/ Facial Wounds   - WOC recommendations appreciated   - Plastics evaluation and recommendations of facial wound appreciated     # DVT/ GI PPX with FULL AC/ PPI   # CODE STATUS - FULL CODE   # DISPO - [facility]

## 2021-01-23 LAB
ANION GAP SERPL CALC-SCNC: 10 MMOL/L — SIGNIFICANT CHANGE UP (ref 7–14)
BASOPHILS # BLD AUTO: 0.08 K/UL — SIGNIFICANT CHANGE UP (ref 0–0.2)
BASOPHILS NFR BLD AUTO: 0.8 % — SIGNIFICANT CHANGE UP (ref 0–2)
BUN SERPL-MCNC: 12 MG/DL — SIGNIFICANT CHANGE UP (ref 7–23)
CALCIUM SERPL-MCNC: 9.1 MG/DL — SIGNIFICANT CHANGE UP (ref 8.4–10.5)
CHLORIDE SERPL-SCNC: 97 MMOL/L — LOW (ref 98–107)
CO2 SERPL-SCNC: 30 MMOL/L — SIGNIFICANT CHANGE UP (ref 22–31)
CREAT SERPL-MCNC: 0.24 MG/DL — LOW (ref 0.5–1.3)
EOSINOPHIL # BLD AUTO: 0.61 K/UL — HIGH (ref 0–0.5)
EOSINOPHIL NFR BLD AUTO: 6 % — SIGNIFICANT CHANGE UP (ref 0–6)
GLUCOSE BLDC GLUCOMTR-MCNC: 104 MG/DL — HIGH (ref 70–99)
GLUCOSE BLDC GLUCOMTR-MCNC: 104 MG/DL — HIGH (ref 70–99)
GLUCOSE BLDC GLUCOMTR-MCNC: 115 MG/DL — HIGH (ref 70–99)
GLUCOSE SERPL-MCNC: 103 MG/DL — HIGH (ref 70–99)
HCT VFR BLD CALC: 31.1 % — LOW (ref 34.5–45)
HGB BLD-MCNC: 9.1 G/DL — LOW (ref 11.5–15.5)
IANC: 6.81 K/UL — SIGNIFICANT CHANGE UP (ref 1.5–8.5)
IMM GRANULOCYTES NFR BLD AUTO: 1.1 % — SIGNIFICANT CHANGE UP (ref 0–1.5)
LYMPHOCYTES # BLD AUTO: 1.85 K/UL — SIGNIFICANT CHANGE UP (ref 1–3.3)
LYMPHOCYTES # BLD AUTO: 18 % — SIGNIFICANT CHANGE UP (ref 13–44)
MAGNESIUM SERPL-MCNC: 1.6 MG/DL — SIGNIFICANT CHANGE UP (ref 1.6–2.6)
MCHC RBC-ENTMCNC: 28.2 PG — SIGNIFICANT CHANGE UP (ref 27–34)
MCHC RBC-ENTMCNC: 29.3 GM/DL — LOW (ref 32–36)
MCV RBC AUTO: 96.3 FL — SIGNIFICANT CHANGE UP (ref 80–100)
MONOCYTES # BLD AUTO: 0.79 K/UL — SIGNIFICANT CHANGE UP (ref 0–0.9)
MONOCYTES NFR BLD AUTO: 7.7 % — SIGNIFICANT CHANGE UP (ref 2–14)
NEUTROPHILS # BLD AUTO: 6.81 K/UL — SIGNIFICANT CHANGE UP (ref 1.8–7.4)
NEUTROPHILS NFR BLD AUTO: 66.4 % — SIGNIFICANT CHANGE UP (ref 43–77)
NRBC # BLD: 0 /100 WBCS — SIGNIFICANT CHANGE UP
NRBC # FLD: 0 K/UL — SIGNIFICANT CHANGE UP
PHOSPHATE SERPL-MCNC: 3.6 MG/DL — SIGNIFICANT CHANGE UP (ref 2.5–4.5)
PLATELET # BLD AUTO: 377 K/UL — SIGNIFICANT CHANGE UP (ref 150–400)
POTASSIUM SERPL-MCNC: 3.8 MMOL/L — SIGNIFICANT CHANGE UP (ref 3.5–5.3)
POTASSIUM SERPL-SCNC: 3.8 MMOL/L — SIGNIFICANT CHANGE UP (ref 3.5–5.3)
RBC # BLD: 3.23 M/UL — LOW (ref 3.8–5.2)
RBC # FLD: 17.4 % — HIGH (ref 10.3–14.5)
SODIUM SERPL-SCNC: 137 MMOL/L — SIGNIFICANT CHANGE UP (ref 135–145)
WBC # BLD: 10.25 K/UL — SIGNIFICANT CHANGE UP (ref 3.8–10.5)
WBC # FLD AUTO: 10.25 K/UL — SIGNIFICANT CHANGE UP (ref 3.8–10.5)

## 2021-01-23 PROCEDURE — 99233 SBSQ HOSP IP/OBS HIGH 50: CPT | Mod: GC

## 2021-01-23 RX ADMIN — AMLODIPINE BESYLATE 5 MILLIGRAM(S): 2.5 TABLET ORAL at 06:41

## 2021-01-23 RX ADMIN — Medication 1 PACKET(S): at 06:41

## 2021-01-23 RX ADMIN — Medication 1 APPLICATION(S): at 17:16

## 2021-01-23 RX ADMIN — ENOXAPARIN SODIUM 100 MILLIGRAM(S): 100 INJECTION SUBCUTANEOUS at 06:41

## 2021-01-23 RX ADMIN — Medication 500 MILLIGRAM(S): at 11:23

## 2021-01-23 RX ADMIN — PANTOPRAZOLE SODIUM 40 MILLIGRAM(S): 20 TABLET, DELAYED RELEASE ORAL at 11:23

## 2021-01-23 RX ADMIN — Medication 1 APPLICATION(S): at 06:41

## 2021-01-23 RX ADMIN — ENOXAPARIN SODIUM 100 MILLIGRAM(S): 100 INJECTION SUBCUTANEOUS at 17:16

## 2021-01-23 RX ADMIN — Medication 50 MILLIGRAM(S): at 17:16

## 2021-01-23 RX ADMIN — Medication 15 MILLILITER(S): at 11:23

## 2021-01-23 RX ADMIN — ZINC SULFATE TAB 220 MG (50 MG ZINC EQUIVALENT) 220 MILLIGRAM(S): 220 (50 ZN) TAB at 11:24

## 2021-01-23 RX ADMIN — CHLORHEXIDINE GLUCONATE 1 APPLICATION(S): 213 SOLUTION TOPICAL at 11:24

## 2021-01-23 RX ADMIN — Medication 1 TABLET(S): at 06:41

## 2021-01-23 RX ADMIN — Medication 650 MILLIGRAM(S): at 07:50

## 2021-01-23 RX ADMIN — Medication 1 TABLET(S): at 17:16

## 2021-01-23 RX ADMIN — Medication 650 MILLIGRAM(S): at 00:02

## 2021-01-23 RX ADMIN — Medication 50 MILLIGRAM(S): at 06:41

## 2021-01-23 NOTE — PROGRESS NOTE ADULT - ATTENDING COMMENTS
61 YO F with PMHx of HTN, DVT on Xarelto, Peritonitis s/p Oral's Procedure and Ileostomy (reversed in 2011) and AARON who presented to [facility] on 11/18 for AHRF second to COVID 19, intubated on 11/23 complicated by lung abscesses on CT CHEST 12/5, multi-bacterial PNA and bacteremia, ARTEMIO s/p CRRT and HD, and s/p Tracheostomy 12/18. Transferred to [facility] for further management.

## 2021-01-23 NOTE — PROGRESS NOTE ADULT - SUBJECTIVE AND OBJECTIVE BOX
CHIEF COMPLAINT: Patient is a 60y old Female who presents with a chief complaint of Transfer from [facility].    Interval Events:    REVIEW OF SYSTEMS:  [ ] All other systems negative  [ ] Unable to assess ROS because ________    OBJECTIVE:  ICU Vital Signs Last 24 Hrs  T(C): 36.9 (23 Jan 2021 06:37), Max: 36.9 (22 Jan 2021 11:31)  T(F): 98.4 (23 Jan 2021 06:37), Max: 98.5 (22 Jan 2021 11:31)  HR: 99 (23 Jan 2021 06:37) (83 - 99)  BP: 152/68 (23 Jan 2021 06:37) (136/53 - 152/68)  RR: 28 (23 Jan 2021 06:37) (24 - 30)  SpO2: 100% (23 Jan 2021 06:37) (100% - 100%)    Mode: AC/ CMV (Assist Control/ Continuous Mandatory Ventilation), RR (machine): 12, TV (machine): 450, FiO2: 40, PEEP: 5, MAP: 15.2, PIP: 38    01-22 @ 07:01  -  01-23 @ 07:00  --------------------------------------------------------  IN: 0 mL / OUT: 400 mL / NET: -400 mL      CAPILLARY BLOOD GLUCOSE      POCT Blood Glucose.: 104 mg/dL (23 Jan 2021 05:41)      HOSPITAL MEDICATIONS:  MEDICATIONS  (STANDING):  amLODIPine   Tablet 5 milliGRAM(s) Oral daily  ascorbic acid 500 milliGRAM(s) Oral daily  chlorhexidine 4% Liquid 1 Application(s) Topical daily  collagenase Ointment 1 Application(s) Topical two times a day  enoxaparin Injectable 100 milliGRAM(s) SubCutaneous every 12 hours  insulin lispro (ADMELOG) corrective regimen sliding scale   SubCutaneous every 6 hours  lactobacillus acidophilus 1 Tablet(s) Oral two times a day  metoprolol tartrate 50 milliGRAM(s) Oral two times a day  multivitamin/minerals/iron Oral Solution (CENTRUM) 15 milliLiter(s) Oral daily  pantoprazole  Injectable 40 milliGRAM(s) IV Push daily  psyllium Powder 1 Packet(s) Oral two times a day  zinc sulfate 220 milliGRAM(s) Oral daily    MEDICATIONS  (PRN):  acetaminophen    Suspension .. 650 milliGRAM(s) Oral every 6 hours PRN Temp greater or equal to 38C (100.4F), Mild Pain (1 - 3), Moderate Pain (4 - 6)      LABS:                         MICROBIOLOGY:     RADIOLOGY:  [ ] Reviewed and interpreted by me    PULMONARY FUNCTION TESTS:    EKG: CHIEF COMPLAINT: Patient is a 60y old Female who presents with a chief complaint of Transfer from [facility].    Interval Events: No interval events noted overnight.     REVIEW OF SYSTEMS:  Denies fever, chills, CP, SOB, abd pain, N/V  [x] All other systems negative    OBJECTIVE:  ICU Vital Signs Last 24 Hrs  T(C): 36.9 (23 Jan 2021 06:37), Max: 36.9 (22 Jan 2021 11:31)  T(F): 98.4 (23 Jan 2021 06:37), Max: 98.5 (22 Jan 2021 11:31)  HR: 99 (23 Jan 2021 06:37) (83 - 99)  BP: 152/68 (23 Jan 2021 06:37) (136/53 - 152/68)  RR: 28 (23 Jan 2021 06:37) (24 - 30)  SpO2: 100% (23 Jan 2021 06:37) (100% - 100%)    Mode: AC/ CMV (Assist Control/ Continuous Mandatory Ventilation), RR (machine): 12, TV (machine): 450, FiO2: 40, PEEP: 5, MAP: 15.2, PIP: 38    01-22 @ 07:01  -  01-23 @ 07:00  --------------------------------------------------------  IN: 0 mL / OUT: 400 mL / NET: -400 mL      CAPILLARY BLOOD GLUCOSE      POCT Blood Glucose.: 104 mg/dL (23 Jan 2021 05:41)      HOSPITAL MEDICATIONS:  MEDICATIONS  (STANDING):  amLODIPine   Tablet 5 milliGRAM(s) Oral daily  ascorbic acid 500 milliGRAM(s) Oral daily  chlorhexidine 4% Liquid 1 Application(s) Topical daily  collagenase Ointment 1 Application(s) Topical two times a day  enoxaparin Injectable 100 milliGRAM(s) SubCutaneous every 12 hours  insulin lispro (ADMELOG) corrective regimen sliding scale   SubCutaneous every 6 hours  lactobacillus acidophilus 1 Tablet(s) Oral two times a day  metoprolol tartrate 50 milliGRAM(s) Oral two times a day  multivitamin/minerals/iron Oral Solution (CENTRUM) 15 milliLiter(s) Oral daily  pantoprazole  Injectable 40 milliGRAM(s) IV Push daily  psyllium Powder 1 Packet(s) Oral two times a day  zinc sulfate 220 milliGRAM(s) Oral daily    MEDICATIONS  (PRN):  acetaminophen    Suspension .. 650 milliGRAM(s) Oral every 6 hours PRN Temp greater or equal to 38C (100.4F), Mild Pain (1 - 3), Moderate Pain (4 - 6)      LABS:                         MICROBIOLOGY:     RADIOLOGY:  [ ] Reviewed and interpreted by me    PULMONARY FUNCTION TESTS:    EKG:

## 2021-01-23 NOTE — PROGRESS NOTE ADULT - ASSESSMENT
61 YO F with PMHx of HTN, DVT on Xarelto, Peritonitis s/p Oral's Procedure and Ileostomy (reversed in 2011) and AARON who presented to [facility] on 11/18 for AHRF second to COVID 19, intubated on 11/23 complicated by lung abscesses on CT CHEST 12/5, multi-bacterial PNA and bacteremia, ARTEMIO s/p CRRT and HD, and s/p Tracheostomy 12/18. Transferred to [facility] for further management.     # AMS   - AOX3 at baseline.   - CTH and EEG WNL   - Now weaned off all sedation.   - Monitor mentation and supportive care   - More alert/follows commands   - Asking to be oob to chair    # ARDS second to COVID vs Multi-bacterial PNA/ R Lung Abscess  - Intubated 11/23 and course complicated with multi-bacterial PNA and lung abscesses.   - Lung abscess presented on CT CHEST 12/5  - s/p Tracheostomy on 12/18 by CTSx. Sutures out 1/1/2021  - Air leak noted and s/p Trach Exchange for Bivona with CTSx 12/31  - Continue on AC vent and PS as tolerated. Tolerated PS for 1 hr today 1/22  - Proventil, Chest PT and Suction PRN. Trach Care QD.     # Vasoplegic vs Septic Shock   - Weaned off pressors. Monitor BP on Norvasc and Metoprolol.   - On full AC for Hx of DVT. CTA CHEST with no PEs.     # ARTEMIO s/p CRRT and HD   - ARTEMIO now resolved and no longer required HD.   - Monitor renal function and avoid nephrotoxins.     # Dysphagia   - Initially CTSx and GI deferred PEG given Ventral Hernia   - s/p PEG placement with IR 1/11   - Nauseous episodes noted and s/p Reglan PRN. Continue on TF and monitor tolerance.   - No new sx /tolerating feeds     # Sepsis second to COVID vs Multi-bacterial PNA/ Bacteremia and R Lung Abscess   - COVID with superimposed multi-bacterial VAP vs aspiration PNA vs cavitary PNA.   - SCx (11/24) MSSA and BCx (11/27) with MSSA and Proteus Bacteremia  - SCx (12/1, 12/12 and 12/27) with Stenotrophomonas.   - SCx (1/9) with  Pseudomonas and Stenotrophomonas   - BCX has been persistently negative from 12/1, and most recently 1/12   - CT CHEST 12/4 with right lung cavitation.   - s/p multiple antibiotics, but now on Fortaz and Flagyl (1/12-1/16)   - Monitor off antibiotics.   - WBC elevated - no new fevers/follow up cbc in am     # DM2   - Continue on ISS and NPH and monitor FS.   - Adjust insulin as needed.     # Hx of DVT   - Continue on Lovenox FULL DOSE for Xarelto for now.     # Skin/ Facial Wounds   - WOC recommendations appreciated   - Plastics evaluation and recommendations of facial wound appreciated     # DVT/ GI PPX with FULL AC/ PPI   # CODE STATUS - FULL CODE   # DISPO - [facility]    61 YO F with PMHx of HTN, DVT on Xarelto, Peritonitis s/p Oral's Procedure and Ileostomy (reversed in 2011) and AARON who presented to [facility] on 11/18 for AHRF second to COVID 19, intubated on 11/23 complicated by lung abscesses on CT CHEST 12/5, multi-bacterial PNA and bacteremia, ARTEMIO s/p CRRT and HD, and s/p Tracheostomy 12/18. Transferred to [facility] for further management.     # AMS   - AOX4 at baseline.   - CTH and EEG WNL   - Now weaned off all sedation.   - Monitor mentation and supportive care   - Alert/follows commands     # ARDS second to COVID vs Multi-bacterial PNA/ R Lung Abscess  - Intubated 11/23 and course complicated with multi-bacterial PNA and lung abscesses.   - Lung abscess presented on CT CHEST 12/5  - s/p Tracheostomy on 12/18 by CTSx. Sutures out 1/1/2021  - Air leak noted and s/p Trach Exchange for Bivona with CTSx 12/31  - Continue on AC vent and PS as tolerated. Tolerated PS for 1 hr today 1/22  - Proventil, Chest PT and Suction PRN. Trach Care QD.     # Vasoplegic vs Septic Shock   - Weaned off pressors. Monitor BP on Norvasc and Metoprolol.   - On full AC for Hx of DVT. CTA CHEST with no PEs.     # ARTEMIO s/p CRRT and HD   - ARTEMIO now resolved and no longer required HD.   - Monitor renal function and avoid nephrotoxins.     # Dysphagia   - Initially CTSx and GI deferred PEG given Ventral Hernia   - s/p PEG placement with IR 1/11   - Nauseous episodes noted and s/p Reglan PRN. Continue on TF and monitor tolerance.   - No new sx /tolerating feeds     # Sepsis second to COVID vs Multi-bacterial PNA/ Bacteremia and R Lung Abscess   - COVID with superimposed multi-bacterial VAP vs aspiration PNA vs cavitary PNA.   - SCx (11/24) MSSA and BCx (11/27) with MSSA and Proteus Bacteremia  - SCx (12/1, 12/12 and 12/27) with Stenotrophomonas.   - SCx (1/9) with  Pseudomonas and Stenotrophomonas   - BCX has been persistently negative from 12/1, and most recently 1/12   - CT CHEST 12/4 with right lung cavitation.   - s/p multiple antibiotics, but now on Fortaz and Flagyl (1/12-1/16)   - Monitor off antibiotics.   - WBC elevated - no new fevers/follow up cbc in am     # DM2   - Continue on ISS and NPH and monitor FS.   - Adjust insulin as needed.     # Hx of DVT   - Continue on Lovenox FULL DOSE for Xarelto for now.     # Skin/ Facial Wounds   - WOC recommendations appreciated   - Plastics evaluation and recommendations of facial wound appreciated     # DVT/ GI PPX with FULL AC/ PPI   # CODE STATUS - FULL CODE   # DISPO - Rehab likely [facility] facility

## 2021-01-24 LAB
ANION GAP SERPL CALC-SCNC: 9 MMOL/L — SIGNIFICANT CHANGE UP (ref 7–14)
BUN SERPL-MCNC: 9 MG/DL — SIGNIFICANT CHANGE UP (ref 7–23)
CALCIUM SERPL-MCNC: 8.7 MG/DL — SIGNIFICANT CHANGE UP (ref 8.4–10.5)
CHLORIDE SERPL-SCNC: 99 MMOL/L — SIGNIFICANT CHANGE UP (ref 98–107)
CO2 SERPL-SCNC: 29 MMOL/L — SIGNIFICANT CHANGE UP (ref 22–31)
CREAT SERPL-MCNC: <0.2 MG/DL — LOW (ref 0.5–1.3)
GLUCOSE BLDC GLUCOMTR-MCNC: 106 MG/DL — HIGH (ref 70–99)
GLUCOSE BLDC GLUCOMTR-MCNC: 113 MG/DL — HIGH (ref 70–99)
GLUCOSE BLDC GLUCOMTR-MCNC: 127 MG/DL — HIGH (ref 70–99)
GLUCOSE BLDC GLUCOMTR-MCNC: 143 MG/DL — HIGH (ref 70–99)
GLUCOSE BLDC GLUCOMTR-MCNC: 161 MG/DL — HIGH (ref 70–99)
GLUCOSE SERPL-MCNC: 109 MG/DL — HIGH (ref 70–99)
HCT VFR BLD CALC: 28.3 % — LOW (ref 34.5–45)
HGB BLD-MCNC: 8.4 G/DL — LOW (ref 11.5–15.5)
MAGNESIUM SERPL-MCNC: 1.3 MG/DL — LOW (ref 1.6–2.6)
MCHC RBC-ENTMCNC: 28.6 PG — SIGNIFICANT CHANGE UP (ref 27–34)
MCHC RBC-ENTMCNC: 29.7 GM/DL — LOW (ref 32–36)
MCV RBC AUTO: 96.3 FL — SIGNIFICANT CHANGE UP (ref 80–100)
NRBC # BLD: 0 /100 WBCS — SIGNIFICANT CHANGE UP
NRBC # FLD: 0 K/UL — SIGNIFICANT CHANGE UP
PHOSPHATE SERPL-MCNC: 3.3 MG/DL — SIGNIFICANT CHANGE UP (ref 2.5–4.5)
PLATELET # BLD AUTO: 355 K/UL — SIGNIFICANT CHANGE UP (ref 150–400)
POTASSIUM SERPL-MCNC: 3.7 MMOL/L — SIGNIFICANT CHANGE UP (ref 3.5–5.3)
POTASSIUM SERPL-SCNC: 3.7 MMOL/L — SIGNIFICANT CHANGE UP (ref 3.5–5.3)
RBC # BLD: 2.94 M/UL — LOW (ref 3.8–5.2)
RBC # FLD: 17.3 % — HIGH (ref 10.3–14.5)
SARS-COV-2 RNA SPEC QL NAA+PROBE: SIGNIFICANT CHANGE UP
SODIUM SERPL-SCNC: 137 MMOL/L — SIGNIFICANT CHANGE UP (ref 135–145)
WBC # BLD: 9.59 K/UL — SIGNIFICANT CHANGE UP (ref 3.8–10.5)
WBC # FLD AUTO: 9.59 K/UL — SIGNIFICANT CHANGE UP (ref 3.8–10.5)

## 2021-01-24 PROCEDURE — 99233 SBSQ HOSP IP/OBS HIGH 50: CPT | Mod: GC

## 2021-01-24 PROCEDURE — 93010 ELECTROCARDIOGRAM REPORT: CPT

## 2021-01-24 PROCEDURE — 99231 SBSQ HOSP IP/OBS SF/LOW 25: CPT

## 2021-01-24 RX ORDER — MAGNESIUM SULFATE 500 MG/ML
1 VIAL (ML) INJECTION ONCE
Refills: 0 | Status: COMPLETED | OUTPATIENT
Start: 2021-01-24 | End: 2021-01-24

## 2021-01-24 RX ORDER — OXYCODONE HYDROCHLORIDE 5 MG/1
5 TABLET ORAL EVERY 4 HOURS
Refills: 0 | Status: DISCONTINUED | OUTPATIENT
Start: 2021-01-24 | End: 2021-01-28

## 2021-01-24 RX ADMIN — Medication 1 TABLET(S): at 16:36

## 2021-01-24 RX ADMIN — PANTOPRAZOLE SODIUM 40 MILLIGRAM(S): 20 TABLET, DELAYED RELEASE ORAL at 12:10

## 2021-01-24 RX ADMIN — OXYCODONE HYDROCHLORIDE 5 MILLIGRAM(S): 5 TABLET ORAL at 23:39

## 2021-01-24 RX ADMIN — Medication 1 TABLET(S): at 05:12

## 2021-01-24 RX ADMIN — ENOXAPARIN SODIUM 100 MILLIGRAM(S): 100 INJECTION SUBCUTANEOUS at 05:12

## 2021-01-24 RX ADMIN — Medication 1 PACKET(S): at 05:12

## 2021-01-24 RX ADMIN — Medication 50 MILLIGRAM(S): at 16:36

## 2021-01-24 RX ADMIN — OXYCODONE HYDROCHLORIDE 5 MILLIGRAM(S): 5 TABLET ORAL at 19:39

## 2021-01-24 RX ADMIN — Medication 650 MILLIGRAM(S): at 22:52

## 2021-01-24 RX ADMIN — OXYCODONE HYDROCHLORIDE 5 MILLIGRAM(S): 5 TABLET ORAL at 15:39

## 2021-01-24 RX ADMIN — Medication 1 APPLICATION(S): at 05:12

## 2021-01-24 RX ADMIN — ZINC SULFATE TAB 220 MG (50 MG ZINC EQUIVALENT) 220 MILLIGRAM(S): 220 (50 ZN) TAB at 12:11

## 2021-01-24 RX ADMIN — ENOXAPARIN SODIUM 100 MILLIGRAM(S): 100 INJECTION SUBCUTANEOUS at 16:36

## 2021-01-24 RX ADMIN — Medication 1 APPLICATION(S): at 16:36

## 2021-01-24 RX ADMIN — Medication 500 MILLIGRAM(S): at 12:10

## 2021-01-24 RX ADMIN — AMLODIPINE BESYLATE 5 MILLIGRAM(S): 2.5 TABLET ORAL at 05:12

## 2021-01-24 RX ADMIN — Medication 2: at 23:33

## 2021-01-24 RX ADMIN — Medication 650 MILLIGRAM(S): at 00:21

## 2021-01-24 RX ADMIN — Medication 650 MILLIGRAM(S): at 10:45

## 2021-01-24 RX ADMIN — Medication 1 PACKET(S): at 16:36

## 2021-01-24 RX ADMIN — Medication 100 GRAM(S): at 10:35

## 2021-01-24 RX ADMIN — Medication 15 MILLILITER(S): at 12:10

## 2021-01-24 RX ADMIN — Medication 50 MILLIGRAM(S): at 05:12

## 2021-01-24 RX ADMIN — CHLORHEXIDINE GLUCONATE 1 APPLICATION(S): 213 SOLUTION TOPICAL at 12:10

## 2021-01-24 NOTE — CHART NOTE - NSCHARTNOTEFT_GEN_A_CORE
Notified by RN that pt febrile to 102, tachy to 144, no respiratory distress but requesting to s/w provider. Pt seen at bedside. Pt states that she is experiencing Notified by RN that pt febrile to 102, tachy to 144, no respiratory distress but requesting to s/w provider. Pt seen at bedside. Pt states that she is experiencing abdominal pain but admits to passing gas. Advised patient that tachycardia likely 2/2 being febrile. Received prn tylenol for temp. Will hold TF x 2 hours as pt experiencing abd pain and restart at slower rate. Denies any N/V. On exam, appears in NAD. Cards: Tachy but regular rhythm. Lungs: CTA B/L. Abd: obese, soft, nontender, bsx4. Pt has prn oxycodone, RN to give dose now. Advised that if abd pain does not improve or worsens, will obtain AXR. Will continue to monitor closely.     Vital Signs Last 24 Hrs  T(C): 38.9 (24 Jan 2021 22:56), Max: 38.9 (24 Jan 2021 22:56)  T(F): 102.1 (24 Jan 2021 22:56), Max: 102.1 (24 Jan 2021 22:56)  HR: 143 (24 Jan 2021 23:08) (76 - 144)  BP: 131/66 (24 Jan 2021 22:56) (131/66 - 166/72)  BP(mean): --  RR: 36 (24 Jan 2021 22:56) (20 - 36)  SpO2: 98% (24 Jan 2021 23:08) (96% - 100%)    JAYASHREE Scherer PA-C  [id number] Notified by RN that pt febrile to 102, tachy to 144, no respiratory distress but requesting to s/w provider. Pt seen at bedside. Pt states that she is experiencing abdominal pain but admits to passing gas. Advised patient that tachycardia likely 2/2 being febrile. Received prn tylenol for temp. Will hold TF x 2 hours as pt experiencing abd pain and restart at slower rate. Denies any N/V. On exam, appears in NAD. Cards: Tachy but regular rhythm. Lungs: CTA B/L. Abd: obese, soft, nontender, bsx4. Pt has prn oxycodone, RN to give dose now. Advised that if abd pain does not improve or worsens, will obtain AXR. Repeat bcx sent. Will continue to monitor closely.     Vital Signs Last 24 Hrs  T(C): 38.9 (24 Jan 2021 22:56), Max: 38.9 (24 Jan 2021 22:56)  T(F): 102.1 (24 Jan 2021 22:56), Max: 102.1 (24 Jan 2021 22:56)  HR: 143 (24 Jan 2021 23:08) (76 - 144)  BP: 131/66 (24 Jan 2021 22:56) (131/66 - 166/72)  BP(mean): --  RR: 36 (24 Jan 2021 22:56) (20 - 36)  SpO2: 98% (24 Jan 2021 23:08) (96% - 100%)    JAYASHREE Scherer PA-C  [id number]

## 2021-01-24 NOTE — PROGRESS NOTE ADULT - ASSESSMENT
59 YO F with PMHx of HTN, DVT on Xarelto, Peritonitis s/p Oral's Procedure and Ileostomy (reversed in 2011) and AARON who presented to [facility] on 11/18 for AHRF second to COVID 19, intubated on 11/23 complicated by lung abscesses on CT CHEST 12/5, multi-bacterial PNA and bacteremia, ARTEMIO s/p CRRT and HD, and s/p Tracheostomy 12/18. Transferred to [facility] for further management.     # AMS   - AOX4 at baseline.   - CTH and EEG WNL   - Now weaned off all sedation.   - Monitor mentation and supportive care   - Alert/follows commands     # ARDS second to COVID vs Multi-bacterial PNA/ R Lung Abscess  - Intubated 11/23 and course complicated with multi-bacterial PNA and lung abscesses.   - Lung abscess presented on CT CHEST 12/5  - s/p Tracheostomy on 12/18 by CTSx. Sutures out 1/1/2021  - Air leak noted and s/p Trach Exchange for Bivona with CTSx 12/31  - Continue on AC vent and PS as tolerated. Tolerated PS for 1 hr today 1/22  - Proventil, Chest PT and Suction PRN. Trach Care QD.     # Vasoplegic vs Septic Shock   - Weaned off pressors. Monitor BP on Norvasc and Metoprolol.   - On full AC for Hx of DVT. CTA CHEST with no PEs.     # ARTEMIO s/p CRRT and HD   - ARTEMIO now resolved and no longer required HD.   - Monitor renal function and avoid nephrotoxins.     # Dysphagia   - Initially CTSx and GI deferred PEG given Ventral Hernia   - s/p PEG placement with IR 1/11   - Nauseous episodes noted and s/p Reglan PRN. Continue on TF and monitor tolerance.   - No new sx /tolerating feeds     # Sepsis second to COVID vs Multi-bacterial PNA/ Bacteremia and R Lung Abscess   - COVID with superimposed multi-bacterial VAP vs aspiration PNA vs cavitary PNA.   - SCx (11/24) MSSA and BCx (11/27) with MSSA and Proteus Bacteremia  - SCx (12/1, 12/12 and 12/27) with Stenotrophomonas.   - SCx (1/9) with  Pseudomonas and Stenotrophomonas   - BCX has been persistently negative from 12/1, and most recently 1/12   - CT CHEST 12/4 with right lung cavitation.   - s/p multiple antibiotics, but now on Fortaz and Flagyl (1/12-1/16)   - Monitor off antibiotics.   - WBC elevated - no new fevers/follow up cbc in am     # DM2   - Continue on ISS and NPH and monitor FS.   - Adjust insulin as needed.     # Hx of DVT   - Continue on Lovenox FULL DOSE for Xarelto for now.     # Skin/ Facial Wounds   - WOC recommendations appreciated   - Plastics evaluation and recommendations of facial wound appreciated     # DVT/ GI PPX with FULL AC/ PPI   # CODE STATUS - FULL CODE   # DISPO - Rehab likely [facility] facility   61 YO F with PMHx of HTN, DVT on Xarelto, Peritonitis s/p Oral's Procedure and Ileostomy (reversed in 2011) and AARON who presented to [facility] on 11/18 for AHRF second to COVID 19, intubated on 11/23 complicated by lung abscesses on CT CHEST 12/5, multi-bacterial PNA and bacteremia, ARTEMIO s/p CRRT and HD, and s/p Tracheostomy 12/18. Transferred to [facility] for further management.     # AMS   - AOX4 at baseline.   - CTH and EEG WNL   - Now weaned off all sedation.   - Monitor mentation and supportive care   - Alert/follows commands     # ARDS second to COVID vs Multi-bacterial PNA/ R Lung Abscess  - Intubated 11/23 and course complicated with multi-bacterial PNA and lung abscesses.   - Lung abscess presented on CT CHEST 12/5  - s/p Tracheostomy on 12/18 by CTSx. Sutures out 1/1/2021  - Air leak noted and s/p Trach Exchange for Bivona with CTSx 12/31  - Continue on AC vent and PS as tolerated. Tolerated PS for 1 hr today 1/22  - Proventil, Chest PT and Suction PRN. Trach Care QD.     # Vasoplegic vs Septic Shock   - Weaned off pressors. Monitor BP on Norvasc and Metoprolol.   - On full AC for Hx of DVT. CTA CHEST with no PEs.     # ARTEMIO s/p CRRT and HD   - ARTEMIO now resolved and no longer required HD.   - Monitor renal function and avoid nephrotoxins.     # Dysphagia   - Initially CTSx and GI deferred PEG given Ventral Hernia   - s/p PEG placement with IR 1/11   - Nauseous episodes noted and s/p Reglan PRN. Continue on TF and monitor tolerance.   - No new sx /tolerating feeds     # Sepsis second to COVID vs Multi-bacterial PNA/ Bacteremia and R Lung Abscess   - COVID with superimposed multi-bacterial VAP vs aspiration PNA vs cavitary PNA.   - SCx (11/24) MSSA and BCx (11/27) with MSSA and Proteus Bacteremia  - SCx (12/1, 12/12 and 12/27) with Stenotrophomonas.   - SCx (1/9) with  Pseudomonas and Stenotrophomonas   - BCX has been persistently negative from 12/1, and most recently 1/12   - CT CHEST 12/4 with right lung cavitation.   - s/p multiple antibiotics, but now on Fortaz and Flagyl (1/12-1/16)   - Monitor off antibiotics.   - WBC elevated - no new fevers/wbc stable     # DM2   - Continue on ISS and NPH and monitor FS.   - Adjust insulin as needed.     # Hx of DVT   - Continue on Lovenox FULL DOSE for Xarelto for now.     # Skin/ Facial Wounds   - WOC recommendations appreciated   - Plastics evaluation and recommendations of facial wound appreciated     # DVT/ GI PPX with FULL AC/ PPI   # CODE STATUS - FULL CODE   # DISPO - Rehab likely Vent facility. COVID swabbed 1/24

## 2021-01-24 NOTE — PROGRESS NOTE ADULT - SUBJECTIVE AND OBJECTIVE BOX
CHIEF COMPLAINT: Patient is a 60y old Female who presents with a chief complaint of Transfer from [facility].    Interval Events:    REVIEW OF SYSTEMS:  [ ] All other systems negative  [ ] Unable to assess ROS because ________    OBJECTIVE:  ICU Vital Signs Last 24 Hrs  T(C): 36.1 (24 Jan 2021 05:10), Max: 36.8 (23 Jan 2021 08:00)  T(F): 97 (24 Jan 2021 05:10), Max: 98.3 (23 Jan 2021 21:55)  HR: 94 (24 Jan 2021 05:10) (84 - 101)  BP: 164/65 (24 Jan 2021 05:10) (141/74 - 167/71)  BP(mean): --  ABP: --  ABP(mean): --  RR: 20 (24 Jan 2021 05:10) (18 - 26)  SpO2: 100% (24 Jan 2021 05:10) (99% - 100%)    Mode: AC/ CMV (Assist Control/ Continuous Mandatory Ventilation), RR (machine): 12, TV (machine): 450, FiO2: 40, PEEP: 5, MAP: 11, PIP: 27    01-23 @ 07:01  -  01-24 @ 07:00  --------------------------------------------------------  IN: 800 mL / OUT: 1250 mL / NET: -450 mL      CAPILLARY BLOOD GLUCOSE      POCT Blood Glucose.: 113 mg/dL (24 Jan 2021 06:19)      HOSPITAL MEDICATIONS:  MEDICATIONS  (STANDING):  amLODIPine   Tablet 5 milliGRAM(s) Oral daily  ascorbic acid 500 milliGRAM(s) Oral daily  chlorhexidine 4% Liquid 1 Application(s) Topical daily  collagenase Ointment 1 Application(s) Topical two times a day  enoxaparin Injectable 100 milliGRAM(s) SubCutaneous every 12 hours  insulin lispro (ADMELOG) corrective regimen sliding scale   SubCutaneous every 6 hours  lactobacillus acidophilus 1 Tablet(s) Oral two times a day  metoprolol tartrate 50 milliGRAM(s) Oral two times a day  multivitamin/minerals/iron Oral Solution (CENTRUM) 15 milliLiter(s) Oral daily  pantoprazole  Injectable 40 milliGRAM(s) IV Push daily  psyllium Powder 1 Packet(s) Oral two times a day  zinc sulfate 220 milliGRAM(s) Oral daily    MEDICATIONS  (PRN):  acetaminophen    Suspension .. 650 milliGRAM(s) Oral every 6 hours PRN Temp greater or equal to 38C (100.4F), Mild Pain (1 - 3), Moderate Pain (4 - 6)      LABS:                        MICROBIOLOGY:     RADIOLOGY:  [ ] Reviewed and interpreted by me    PULMONARY FUNCTION TESTS:    EKG: CHIEF COMPLAINT: Patient is a 60y old Female who presents with a chief complaint of Transfer from [facility].    Interval Events: No interval events noted overnight.     REVIEW OF SYSTEMS:  Denies fever, chills, SOB, CP, abd pain, N/V  [x] All other systems negative      OBJECTIVE:  ICU Vital Signs Last 24 Hrs  T(C): 36.1 (24 Jan 2021 05:10), Max: 36.8 (23 Jan 2021 08:00)  T(F): 97 (24 Jan 2021 05:10), Max: 98.3 (23 Jan 2021 21:55)  HR: 94 (24 Jan 2021 05:10) (84 - 101)  BP: 164/65 (24 Jan 2021 05:10) (141/74 - 167/71)  BP(mean): --  ABP: --  ABP(mean): --  RR: 20 (24 Jan 2021 05:10) (18 - 26)  SpO2: 100% (24 Jan 2021 05:10) (99% - 100%)    Mode: AC/ CMV (Assist Control/ Continuous Mandatory Ventilation), RR (machine): 12, TV (machine): 450, FiO2: 40, PEEP: 5, MAP: 11, PIP: 27    01-23 @ 07:01  -  01-24 @ 07:00  --------------------------------------------------------  IN: 800 mL / OUT: 1250 mL / NET: -450 mL      CAPILLARY BLOOD GLUCOSE      POCT Blood Glucose.: 113 mg/dL (24 Jan 2021 06:19)      HOSPITAL MEDICATIONS:  MEDICATIONS  (STANDING):  amLODIPine   Tablet 5 milliGRAM(s) Oral daily  ascorbic acid 500 milliGRAM(s) Oral daily  chlorhexidine 4% Liquid 1 Application(s) Topical daily  collagenase Ointment 1 Application(s) Topical two times a day  enoxaparin Injectable 100 milliGRAM(s) SubCutaneous every 12 hours  insulin lispro (ADMELOG) corrective regimen sliding scale   SubCutaneous every 6 hours  lactobacillus acidophilus 1 Tablet(s) Oral two times a day  metoprolol tartrate 50 milliGRAM(s) Oral two times a day  multivitamin/minerals/iron Oral Solution (CENTRUM) 15 milliLiter(s) Oral daily  pantoprazole  Injectable 40 milliGRAM(s) IV Push daily  psyllium Powder 1 Packet(s) Oral two times a day  zinc sulfate 220 milliGRAM(s) Oral daily    MEDICATIONS  (PRN):  acetaminophen    Suspension .. 650 milliGRAM(s) Oral every 6 hours PRN Temp greater or equal to 38C (100.4F), Mild Pain (1 - 3), Moderate Pain (4 - 6)      LABS:                        8.4    9.59  )-----------( 355      ( 24 Jan 2021 07:19 )             28.3     01-24    137  |  99  |  9   ----------------------------<  109<H>  3.7   |  29  |  <0.20<L>    Ca    8.7      24 Jan 2021 07:19  Phos  3.3     01-24  Mg     1.3     01-24                     MICROBIOLOGY:     RADIOLOGY:  [ ] Reviewed and interpreted by me    PULMONARY FUNCTION TESTS:    EKG:

## 2021-01-24 NOTE — PROGRESS NOTE ADULT - ATTENDING COMMENTS
ARDS second to COVID vs Multi-bacterial PNA/ R Lung Abscess  - Intubated 11/23 and course complicated with multi-bacterial PNA and lung abscesses.   - Lung abscess presented on CT CHEST 12/5  - s/p Tracheostomy on 12/18 by CTSx. Sutures out 1/1/2021  - Air leak noted and s/p Trach Exchange for Bivona with CTSx 12/31  - Continue on AC vent and PS as tolerated. Tolerated PS for 1 hr today 1/22  - Proventil, Chest PT and Suction PRN. Trach Care QD.

## 2021-01-25 LAB
ANION GAP SERPL CALC-SCNC: 11 MMOL/L — SIGNIFICANT CHANGE UP (ref 7–14)
BUN SERPL-MCNC: 7 MG/DL — SIGNIFICANT CHANGE UP (ref 7–23)
CALCIUM SERPL-MCNC: 9 MG/DL — SIGNIFICANT CHANGE UP (ref 8.4–10.5)
CHLORIDE SERPL-SCNC: 97 MMOL/L — LOW (ref 98–107)
CO2 SERPL-SCNC: 28 MMOL/L — SIGNIFICANT CHANGE UP (ref 22–31)
CREAT SERPL-MCNC: 0.24 MG/DL — LOW (ref 0.5–1.3)
GLUCOSE BLDC GLUCOMTR-MCNC: 127 MG/DL — HIGH (ref 70–99)
GLUCOSE BLDC GLUCOMTR-MCNC: 129 MG/DL — HIGH (ref 70–99)
GLUCOSE BLDC GLUCOMTR-MCNC: 160 MG/DL — HIGH (ref 70–99)
GLUCOSE SERPL-MCNC: 120 MG/DL — HIGH (ref 70–99)
GRAM STN FLD: SIGNIFICANT CHANGE UP
HCT VFR BLD CALC: 34.1 % — LOW (ref 34.5–45)
HGB BLD-MCNC: 10 G/DL — LOW (ref 11.5–15.5)
MAGNESIUM SERPL-MCNC: 1.4 MG/DL — LOW (ref 1.6–2.6)
MCHC RBC-ENTMCNC: 28.3 PG — SIGNIFICANT CHANGE UP (ref 27–34)
MCHC RBC-ENTMCNC: 29.3 GM/DL — LOW (ref 32–36)
MCV RBC AUTO: 96.6 FL — SIGNIFICANT CHANGE UP (ref 80–100)
NRBC # BLD: 0 /100 WBCS — SIGNIFICANT CHANGE UP
NRBC # FLD: 0.04 K/UL — HIGH
PLATELET # BLD AUTO: 386 K/UL — SIGNIFICANT CHANGE UP (ref 150–400)
POTASSIUM SERPL-MCNC: 4.2 MMOL/L — SIGNIFICANT CHANGE UP (ref 3.5–5.3)
POTASSIUM SERPL-SCNC: 4.2 MMOL/L — SIGNIFICANT CHANGE UP (ref 3.5–5.3)
RBC # BLD: 3.53 M/UL — LOW (ref 3.8–5.2)
RBC # FLD: 17.5 % — HIGH (ref 10.3–14.5)
SODIUM SERPL-SCNC: 136 MMOL/L — SIGNIFICANT CHANGE UP (ref 135–145)
SPECIMEN SOURCE: SIGNIFICANT CHANGE UP
WBC # BLD: 18.34 K/UL — HIGH (ref 3.8–10.5)
WBC # FLD AUTO: 18.34 K/UL — HIGH (ref 3.8–10.5)

## 2021-01-25 PROCEDURE — 99233 SBSQ HOSP IP/OBS HIGH 50: CPT

## 2021-01-25 PROCEDURE — 99232 SBSQ HOSP IP/OBS MODERATE 35: CPT

## 2021-01-25 RX ORDER — MAGNESIUM SULFATE 500 MG/ML
1 VIAL (ML) INJECTION ONCE
Refills: 0 | Status: COMPLETED | OUTPATIENT
Start: 2021-01-25 | End: 2021-01-25

## 2021-01-25 RX ADMIN — Medication 1 APPLICATION(S): at 17:45

## 2021-01-25 RX ADMIN — Medication 50 MILLIGRAM(S): at 17:44

## 2021-01-25 RX ADMIN — Medication 2: at 11:47

## 2021-01-25 RX ADMIN — Medication 100 GRAM(S): at 11:50

## 2021-01-25 RX ADMIN — Medication 650 MILLIGRAM(S): at 08:51

## 2021-01-25 RX ADMIN — ENOXAPARIN SODIUM 100 MILLIGRAM(S): 100 INJECTION SUBCUTANEOUS at 05:35

## 2021-01-25 RX ADMIN — Medication 1 TABLET(S): at 17:45

## 2021-01-25 RX ADMIN — Medication 1 PACKET(S): at 05:35

## 2021-01-25 RX ADMIN — Medication 50 MILLIGRAM(S): at 05:35

## 2021-01-25 RX ADMIN — Medication 1 TABLET(S): at 05:36

## 2021-01-25 RX ADMIN — CHLORHEXIDINE GLUCONATE 1 APPLICATION(S): 213 SOLUTION TOPICAL at 11:51

## 2021-01-25 RX ADMIN — PANTOPRAZOLE SODIUM 40 MILLIGRAM(S): 20 TABLET, DELAYED RELEASE ORAL at 11:51

## 2021-01-25 RX ADMIN — Medication 1 APPLICATION(S): at 05:36

## 2021-01-25 RX ADMIN — Medication 1 PACKET(S): at 17:44

## 2021-01-25 RX ADMIN — Medication 650 MILLIGRAM(S): at 19:56

## 2021-01-25 RX ADMIN — ZINC SULFATE TAB 220 MG (50 MG ZINC EQUIVALENT) 220 MILLIGRAM(S): 220 (50 ZN) TAB at 11:51

## 2021-01-25 RX ADMIN — Medication 15 MILLILITER(S): at 11:51

## 2021-01-25 RX ADMIN — ENOXAPARIN SODIUM 100 MILLIGRAM(S): 100 INJECTION SUBCUTANEOUS at 17:44

## 2021-01-25 RX ADMIN — Medication 500 MILLIGRAM(S): at 11:51

## 2021-01-25 RX ADMIN — AMLODIPINE BESYLATE 5 MILLIGRAM(S): 2.5 TABLET ORAL at 05:36

## 2021-01-25 NOTE — PROGRESS NOTE ADULT - SUBJECTIVE AND OBJECTIVE BOX
CHIEF COMPLAINT: Patient is a 60y old  Female who presents with a chief complaint of Transfer from [facility].    Interval Events:    REVIEW OF SYSTEMS:  [ ] All other systems negative  [ ] Unable to assess ROS because ________    OBJECTIVE:  ICU Vital Signs Last 24 Hrs  T(C): 36.8 (25 Jan 2021 05:33), Max: 38.9 (24 Jan 2021 22:56)  T(F): 98.2 (25 Jan 2021 05:33), Max: 102.1 (24 Jan 2021 22:56)  HR: 103 (25 Jan 2021 06:59) (85 - 144)  BP: 138/58 (25 Jan 2021 05:33) (128/92 - 166/72)  RR: 28 (25 Jan 2021 05:33) (20 - 36)  SpO2: 99% (25 Jan 2021 06:59) (96% - 100%)    Mode: AC/ CMV (Assist Control/ Continuous Mandatory Ventilation), RR (machine): 12, TV (machine): 450, FiO2: 40, PEEP: 5, MAP: 12, PIP: 32    01-24 @ 07:01  -  01-25 @ 07:00  --------------------------------------------------------  IN: 2130 mL / OUT: 1600 mL / NET: 530 mL      CAPILLARY BLOOD GLUCOSE      POCT Blood Glucose.: 129 mg/dL (25 Jan 2021 05:38)      HOSPITAL MEDICATIONS:  MEDICATIONS  (STANDING):  amLODIPine   Tablet 5 milliGRAM(s) Oral daily  ascorbic acid 500 milliGRAM(s) Oral daily  chlorhexidine 4% Liquid 1 Application(s) Topical daily  collagenase Ointment 1 Application(s) Topical two times a day  enoxaparin Injectable 100 milliGRAM(s) SubCutaneous every 12 hours  insulin lispro (ADMELOG) corrective regimen sliding scale   SubCutaneous every 6 hours  lactobacillus acidophilus 1 Tablet(s) Oral two times a day  metoprolol tartrate 50 milliGRAM(s) Oral two times a day  multivitamin/minerals/iron Oral Solution (CENTRUM) 15 milliLiter(s) Oral daily  pantoprazole  Injectable 40 milliGRAM(s) IV Push daily  psyllium Powder 1 Packet(s) Oral two times a day  zinc sulfate 220 milliGRAM(s) Oral daily    MEDICATIONS  (PRN):  acetaminophen    Suspension .. 650 milliGRAM(s) Oral every 6 hours PRN Temp greater or equal to 38C (100.4F), Mild Pain (1 - 3), Moderate Pain (4 - 6)  oxyCODONE    Solution 5 milliGRAM(s) Oral every 4 hours PRN Severe Pain (7 - 10)      LABS:                        10.0   18.34 )-----------( 386      ( 25 Jan 2021 07:05 )             34.1     01-25    136  |  97<L>  |  7   ----------------------------<  120<H>  4.2   |  28  |  0.24<L>    Ca    9.0      25 Jan 2021 07:05  Phos  3.3     01-24  Mg     1.4     01-25      MICROBIOLOGY:     RADIOLOGY:  [ ] Reviewed and interpreted by me    PULMONARY FUNCTION TESTS:    EKG: CHIEF COMPLAINT: Patient is a 60y old  Female who presents with a chief complaint of Transfer from [facility].    Interval Events: Febrile overnight to 102 with elevated white count.     REVIEW OF SYSTEMS:  Denies SOB, CP, abd pain, N/V  [x] All other systems negative      OBJECTIVE:  ICU Vital Signs Last 24 Hrs  T(C): 36.8 (25 Jan 2021 05:33), Max: 38.9 (24 Jan 2021 22:56)  T(F): 98.2 (25 Jan 2021 05:33), Max: 102.1 (24 Jan 2021 22:56)  HR: 103 (25 Jan 2021 06:59) (85 - 144)  BP: 138/58 (25 Jan 2021 05:33) (128/92 - 166/72)  RR: 28 (25 Jan 2021 05:33) (20 - 36)  SpO2: 99% (25 Jan 2021 06:59) (96% - 100%)    Mode: AC/ CMV (Assist Control/ Continuous Mandatory Ventilation), RR (machine): 12, TV (machine): 450, FiO2: 40, PEEP: 5, MAP: 12, PIP: 32    01-24 @ 07:01  -  01-25 @ 07:00  --------------------------------------------------------  IN: 2130 mL / OUT: 1600 mL / NET: 530 mL      CAPILLARY BLOOD GLUCOSE    POCT Blood Glucose.: 129 mg/dL (25 Jan 2021 05:38)    HOSPITAL MEDICATIONS:  MEDICATIONS  (STANDING):  amLODIPine   Tablet 5 milliGRAM(s) Oral daily  ascorbic acid 500 milliGRAM(s) Oral daily  chlorhexidine 4% Liquid 1 Application(s) Topical daily  collagenase Ointment 1 Application(s) Topical two times a day  enoxaparin Injectable 100 milliGRAM(s) SubCutaneous every 12 hours  insulin lispro (ADMELOG) corrective regimen sliding scale   SubCutaneous every 6 hours  lactobacillus acidophilus 1 Tablet(s) Oral two times a day  metoprolol tartrate 50 milliGRAM(s) Oral two times a day  multivitamin/minerals/iron Oral Solution (CENTRUM) 15 milliLiter(s) Oral daily  pantoprazole  Injectable 40 milliGRAM(s) IV Push daily  psyllium Powder 1 Packet(s) Oral two times a day  zinc sulfate 220 milliGRAM(s) Oral daily    MEDICATIONS  (PRN):  acetaminophen    Suspension .. 650 milliGRAM(s) Oral every 6 hours PRN Temp greater or equal to 38C (100.4F), Mild Pain (1 - 3), Moderate Pain (4 - 6)  oxyCODONE    Solution 5 milliGRAM(s) Oral every 4 hours PRN Severe Pain (7 - 10)      LABS:                        10.0   18.34 )-----------( 386      ( 25 Jan 2021 07:05 )             34.1     01-25    136  |  97<L>  |  7   ----------------------------<  120<H>  4.2   |  28  |  0.24<L>    Ca    9.0      25 Jan 2021 07:05  Phos  3.3     01-24  Mg     1.4     01-25    MICROBIOLOGY:     RADIOLOGY:  [ ] Reviewed and interpreted by me    PULMONARY FUNCTION TESTS:    EKG:

## 2021-01-25 NOTE — PROGRESS NOTE ADULT - SUBJECTIVE AND OBJECTIVE BOX
Follow Up:      Inverval History/ROS:Patient is a 60y old  Female who presents with a chief complaint of Transfer from [facility] (22 Dec 2020 08:13)    febrile last night 102.  denies complaint.  able to communicate now.       RN obtaining sputum culture and administering PEG feedings.         Allergies    Kiwi (Unknown)  latex (Anaphylaxis)  latex (Unknown)  penicillin (Other)  penicillin (Unknown)  perfume  hives (Other)  potassium acetate (Other)  soap additives hives (Other)    Intolerances        ANTIMICROBIALS:       MEDICATIONS  (STANDING):  acetaminophen    Suspension .. 650 every 6 hours PRN  amLODIPine   Tablet 5 daily  enoxaparin Injectable 100 every 12 hours  insulin lispro (ADMELOG) corrective regimen sliding scale  every 6 hours  metoprolol tartrate 50 two times a day  metoprolol tartrate Injectable 5 every 6 hours  pantoprazole  Injectable 40 daily  psyllium Powder 1 two times a day    Vital Signs Last 24 Hrs  T(F): 99 (01-25-21 @ 17:27), Max: 102.1 (01-24-21 @ 22:56)  HR: 100 (01-25-21 @ 17:27)  BP: 144/83 (01-25-21 @ 17:27)  RR: 22 (01-25-21 @ 17:27)  SpO2: 100% (01-25-21 @ 17:27) (96% - 100%)    PHYSICAL EXAM:  General: [x ] trach, non toxic  ENT:  eschar chin  Cardiovascular:   distant  Respiratory:  clear ant  GI:  [x ] soft, scars, hernia, gastrostomy tube   :  [ ] negro   Musculoskeletal: weak   Neurologic: moves all extremities  Skin:   no rash  Psychiatric: alert, awake  Lines: arrow both arms                          10.0   18.34 )-----------( 386      ( 25 Jan 2021 07:05 )             34.1 01-25    136  |  97  |  7   ----------------------------<  120  4.2   |  28  |  0.24  Ca    9.0      25 Jan 2021 07:05Phos  3.3     01-24Mg     1.4     01-25      MICROBIOLOGY:    ulCOVID-19 PCR . (01.24.21 @ 15:01)   COVID-19 PCR: NotDetec: Culture - Blood (01.12.21 @ 18:55)   Specimen Source: .Blood Blood-Venous   Culture Results:   No Growth Final Culture - Blood (01.12.21 @ 18:50)   Specimen Source: .Blood Blood-Peripheral   Culture Results:   No Growth Final     1/12 blood cultures x 2 no growth to date    cuCulture - Sputum . (01.09.21 @ 18:30)   - Amikacin: S <=16   - Aztreonam: I 16   - Cefepime: S 8   - Ceftazidime: S 4   - Ceftazidime: S 4   - Ciprofloxacin: S 0.5   - Gentamicin: S <=2   - Imipenem: R >8   - Levofloxacin: S 2   - Levofloxacin: S <=0.5   - Meropenem: R >8   - Piperacillin/Tazobactam: S 16   - Tobramycin: S <=2   - Trimethoprim/Sulfamethoxazole: S <=0.5/9.5   Gram Stain:   Few polymorphonuclear leukocytes per low power field   No Squamous epithelial cells per low power field   Numerous Gram Negative Rods seen per oil power field   Specimen Source: .Sputum Sputum   Culture Results:   Numerous Pseudomonas aeruginosa (Carbapenem Resistant)   Numerous Stenotrophomonas maltophilia   Normal Respiratory Jocelynn absent   Organism Identification: Pseudomonas aeruginosa (Carbapenem Resistant)   Stenotrophomonas maltophilia     RADIOLOGY:    none recnet

## 2021-01-25 NOTE — PROGRESS NOTE ADULT - ASSESSMENT
61 YO F with PMHx of HTN, DVT on Xarelto, Peritonitis s/p Oral's Procedure and Ileostomy (reversed in 2011) and AARON who presented to [facility] on 11/18 for AHRF second to COVID 19, intubated on 11/23 complicated by lung abscesses on CT CHEST 12/5, multi-bacterial PNA and bacteremia, ARTEMIO s/p CRRT and HD, and s/p Tracheostomy 12/18. Transferred to [facility] for further management.     # AMS   - AOX4 at baseline.   - CTH and EEG WNL   - Now weaned off all sedation.   - Monitor mentation and supportive care   - Alert/follows commands     # ARDS second to COVID vs Multi-bacterial PNA/ R Lung Abscess  - Intubated 11/23 and course complicated with multi-bacterial PNA and lung abscesses.   - Lung abscess presented on CT CHEST 12/5  - s/p Tracheostomy on 12/18 by CTSx. Sutures out 1/1/2021  - Air leak noted and s/p Trach Exchange for Bivona with CTSx 12/31  - Continue on AC vent and PS as tolerated. Tolerated PS for 1 hr today 1/22  - Proventil, Chest PT and Suction PRN. Trach Care QD.     # Vasoplegic vs Septic Shock   - Weaned off pressors. Monitor BP on Norvasc and Metoprolol.   - On full AC for Hx of DVT. CTA CHEST with no PEs.     # ARTEMIO s/p CRRT and HD   - ARTEMIO now resolved and no longer required HD.   - Monitor renal function and avoid nephrotoxins.     # Dysphagia   - Initially CTSx and GI deferred PEG given Ventral Hernia   - s/p PEG placement with IR 1/11   - Nauseous episodes noted and s/p Reglan PRN. Continue on TF and monitor tolerance.   - No new sx /tolerating feeds     # Sepsis second to COVID vs Multi-bacterial PNA/ Bacteremia and R Lung Abscess   - COVID with superimposed multi-bacterial VAP vs aspiration PNA vs cavitary PNA.   - SCx (11/24) MSSA and BCx (11/27) with MSSA and Proteus Bacteremia  - SCx (12/1, 12/12 and 12/27) with Stenotrophomonas.   - SCx (1/9) with  Pseudomonas and Stenotrophomonas   - BCX has been persistently negative from 12/1, and most recently 1/12   - CT CHEST 12/4 with right lung cavitation.   - s/p multiple antibiotics, but now on Fortaz and Flagyl (1/12-1/16)   - Monitor off antibiotics.   - WBC elevated - no new fevers/wbc stable     # DM2   - Continue on ISS and NPH and monitor FS.   - Adjust insulin as needed.     # Hx of DVT   - Continue on Lovenox FULL DOSE for Xarelto for now.     # Skin/ Facial Wounds   - WOC recommendations appreciated   - Plastics evaluation and recommendations of facial wound appreciated     # DVT/ GI PPX with FULL AC/ PPI   # CODE STATUS - FULL CODE   # DISPO - Rehab likely Vent facility. COVID swabbed 1/24   59 YO F with PMHx of HTN, DVT on Xarelto, Peritonitis s/p Oral's Procedure and Ileostomy (reversed in 2011) and AARON who presented to [facility] on 11/18 for AHRF second to COVID 19, intubated on 11/23 complicated by lung abscesses on CT CHEST 12/5, multi-bacterial PNA and bacteremia, ARTEMIO s/p CRRT and HD, and s/p Tracheostomy 12/18. Transferred to [facility] for further management.     # AMS   - AOX4 at baseline.   - CTH and EEG WNL   - Now weaned off all sedation.   - Monitor mentation and supportive care   - Alert/follows commands     # ARDS second to COVID vs Multi-bacterial PNA/ R Lung Abscess  - Intubated 11/23 and course complicated with multi-bacterial PNA and lung abscesses.   - Lung abscess presented on CT CHEST 12/5  - s/p Tracheostomy on 12/18 by CTSx. Sutures out 1/1/2021  - Air leak noted and s/p Trach Exchange for Bivona with CTSx 12/31  - Continue on AC vent and PS as tolerated. Tolerated PS for 1 hr today 1/22  - Proventil, Chest PT and Suction PRN. Trach Care QD.     # Vasoplegic vs Septic Shock   - Weaned off pressors. Monitor BP on Norvasc and Metoprolol.   - On full AC for Hx of DVT. CTA CHEST with no PEs.     # ARTEMIO s/p CRRT and HD   - ARTEMIO now resolved and no longer required HD.   - Monitor renal function and avoid nephrotoxins.     # Dysphagia   - Initially CTSx and GI deferred PEG given Ventral Hernia   - s/p PEG placement with IR 1/11   - Nauseous episodes noted and s/p Reglan PRN. Continue on TF and monitor tolerance.   - No new sx /tolerating feeds     # Sepsis second to COVID vs Multi-bacterial PNA/ Bacteremia and R Lung Abscess   - COVID with superimposed multi-bacterial VAP vs aspiration PNA vs cavitary PNA.   - SCx (11/24) MSSA and BCx (11/27) with MSSA and Proteus Bacteremia  - SCx (12/1, 12/12 and 12/27) with Stenotrophomonas.   - SCx (1/9) with  Pseudomonas and Stenotrophomonas   - BCX has been persistently negative from 12/1, and most recently 1/12   - CT CHEST 12/4 with right lung cavitation.   - s/p multiple antibiotics, but now on Fortaz and Flagyl (1/12-1/16)   - Monitor off antibiotics.   - WBC elevated - 1/25 Febrile overnight to 102. Blood/urine cx sent. If febrile again will start on ceftazadine.     # DM2   - Continue on ISS and NPH and monitor FS.   - Adjust insulin as needed.     # Hx of DVT   - Continue on Lovenox FULL DOSE for Xarelto for now.     # Skin/ Facial Wounds   - WOC recommendations appreciated   - Plastics evaluation and recommendations of facial wound appreciated     # DVT/ GI PPX with FULL AC/ PPI   # CODE STATUS - FULL CODE   # DISPO - Rehab likely Vent facility. COVID swab from 1/24 neg

## 2021-01-25 NOTE — PROGRESS NOTE ADULT - ATTENDING COMMENTS
Agree with plan as outlined above. Patient seen and examined at bedside. Patient history, laboratory data, and imaging personally reviewed.    Pt is a 60F with PMHX as above presenting to [facility] for hypoxemic respiratory failure with ARDS 2/2 COVID19 PNA further c/b superimposed MSSA cavitary PNA and ARF requiring HD. Pt with failure to wean from mechanical ventilation, s/p tracheostomy placement 12/18 and transfer to RCU 12/28.     Pt now with improving encephalopathy, able to communicate and answer basic questions/follow commands. Remains off all IV sedation since 12/24. PO sedation held following trach exchange. CT Head (-) 12/12.  EEG (-). No indication for further neurologic w/u at this time given clinical improvement. OOB to chair as tolerated. Moves all 4 extremities independently, but remains physically severely deconditioned.     Pt with trach placement (CTSx, 12/18 Proximal 7XLT and then changed to 8Fr Bivona 12/31 for persistent air leak), now with resolution of air leak and significant improvement in ventilator synchrony. Daily SBTs as tolerated. Pt tolerates short intervals of SBT at 15/5, 40%. Will c/w airway clearance therapy and trach care as per RCU team. O2 saturations remain normal and pt did not exhibit new s/s respiratory distress. Will CTM carefully.     Pt with hx MSSA bacteremia with cavitary PNA s/p completion of Abx with Primaxin on 12/28 followed by course of Ceftazidime through 1/5 for Stenotrophomonas PNA. Pt with recurrence of fevers and (+) sputum cx for Stenotrophomonas and Pseudomonas 1/12 s/p completion of Flagyl+Ceftazidime+Vancomycin with clinical improvement. Pt with 1 episode of fever overnight, cx sent. Currently asymptomatic and afebrile. Will monitor. May need repeat CT Chest for evaluation of prior cavitary PNA.     Pt with oropharyngeal dysphagia, IR guided PEG successfully placed 1/11. Pt tolerating PEG feeds. Pt also initially with ARF on CRRT, following by intermittent HD (last session 12/17) with renal recovery. NPH+HISS for glucose control. Wound consult and plastic sx recs appreciated.    Dispo pending medical optimization.

## 2021-01-25 NOTE — PROGRESS NOTE ADULT - ASSESSMENT
59 yo woman with AARON, h/o Lockett's procedure, s/p reversal 2011 who was admitted to [facility] 11/17 with covid pneumonia.  She received course of remdesevir and dexamethasone; required intubation 11/23 for acute hypoxemic respiratory failure. Hospital course complicated by Proteus bacteremia, MSSA bacteremia/ pneumonia, and Stenotrophomonas respiratory infection, as well as ATN requiring CVVHD --> HD ---> off dialysis.  CT chest 12/4 new large consolidation with cavitation in right lung.  Blood culture 11/27 MSSA and proteus; 11/28 MSSA.     Blood cultures 12/1, 12/11, 12/21, 12/27 no growth.  Sputum culture 12/1, 12/12, 12/27, 12/29, 1/9 Stenotrophomonas.    Multifocal covid pneumonia with respiratory failure, MSSA bacteremia 11/27 with cavitary pneumonia, Proteus bacteremia 11/27, Stenotrophomonas pneumonia.  s/p prolonged antibiotics for bacteremia, pneumonia, aspiration.  s/p trach 12/18.  12/29 Fever, blood tinged sputum and Stenotrophomonas in sputum.  meropenem changed to ceftazidime  --> 1/5 12/31 trach leak.  s/p OR 12/31 for trach change with CT surgery.     1/11 IR gastrostomy placement   1/12 fever pseudomonas and Stenotrophomonas in sputum. received course of ceftazidime, flagyl, vanco.       1/25 fever 102   Leucocytosis wbc 18K     Suggest:   follow cultures  if febrile check CT chest    Mila Burgos MD  Pager: [phone]  After 5 PM or weekends please call fellow on call or office [phone]

## 2021-01-26 LAB
ANION GAP SERPL CALC-SCNC: 11 MMOL/L — SIGNIFICANT CHANGE UP (ref 7–14)
BASOPHILS # BLD AUTO: 0.08 K/UL — SIGNIFICANT CHANGE UP (ref 0–0.2)
BASOPHILS NFR BLD AUTO: 0.6 % — SIGNIFICANT CHANGE UP (ref 0–2)
BUN SERPL-MCNC: 8 MG/DL — SIGNIFICANT CHANGE UP (ref 7–23)
CALCIUM SERPL-MCNC: 8.4 MG/DL — SIGNIFICANT CHANGE UP (ref 8.4–10.5)
CHLORIDE SERPL-SCNC: 96 MMOL/L — LOW (ref 98–107)
CO2 SERPL-SCNC: 28 MMOL/L — SIGNIFICANT CHANGE UP (ref 22–31)
CREAT SERPL-MCNC: 0.23 MG/DL — LOW (ref 0.5–1.3)
EOSINOPHIL # BLD AUTO: 0.46 K/UL — SIGNIFICANT CHANGE UP (ref 0–0.5)
EOSINOPHIL NFR BLD AUTO: 3.3 % — SIGNIFICANT CHANGE UP (ref 0–6)
GLUCOSE BLDC GLUCOMTR-MCNC: 102 MG/DL — HIGH (ref 70–99)
GLUCOSE BLDC GLUCOMTR-MCNC: 118 MG/DL — HIGH (ref 70–99)
GLUCOSE BLDC GLUCOMTR-MCNC: 119 MG/DL — HIGH (ref 70–99)
GLUCOSE BLDC GLUCOMTR-MCNC: 131 MG/DL — HIGH (ref 70–99)
GLUCOSE BLDC GLUCOMTR-MCNC: 133 MG/DL — HIGH (ref 70–99)
GLUCOSE SERPL-MCNC: 137 MG/DL — HIGH (ref 70–99)
GRAM STN FLD: SIGNIFICANT CHANGE UP
HCT VFR BLD CALC: 30.5 % — LOW (ref 34.5–45)
HGB BLD-MCNC: 8.8 G/DL — LOW (ref 11.5–15.5)
IANC: 10.47 K/UL — HIGH (ref 1.5–8.5)
IMM GRANULOCYTES NFR BLD AUTO: 1 % — SIGNIFICANT CHANGE UP (ref 0–1.5)
LYMPHOCYTES # BLD AUTO: 1.84 K/UL — SIGNIFICANT CHANGE UP (ref 1–3.3)
LYMPHOCYTES # BLD AUTO: 13.3 % — SIGNIFICANT CHANGE UP (ref 13–44)
MAGNESIUM SERPL-MCNC: 1.6 MG/DL — SIGNIFICANT CHANGE UP (ref 1.6–2.6)
MCHC RBC-ENTMCNC: 27.8 PG — SIGNIFICANT CHANGE UP (ref 27–34)
MCHC RBC-ENTMCNC: 28.9 GM/DL — LOW (ref 32–36)
MCV RBC AUTO: 96.5 FL — SIGNIFICANT CHANGE UP (ref 80–100)
MONOCYTES # BLD AUTO: 0.88 K/UL — SIGNIFICANT CHANGE UP (ref 0–0.9)
MONOCYTES NFR BLD AUTO: 6.3 % — SIGNIFICANT CHANGE UP (ref 2–14)
NEUTROPHILS # BLD AUTO: 10.47 K/UL — HIGH (ref 1.8–7.4)
NEUTROPHILS NFR BLD AUTO: 75.5 % — SIGNIFICANT CHANGE UP (ref 43–77)
NRBC # BLD: 0 /100 WBCS — SIGNIFICANT CHANGE UP
NRBC # FLD: 0 K/UL — SIGNIFICANT CHANGE UP
PLATELET # BLD AUTO: 362 K/UL — SIGNIFICANT CHANGE UP (ref 150–400)
POTASSIUM SERPL-MCNC: 3.5 MMOL/L — SIGNIFICANT CHANGE UP (ref 3.5–5.3)
POTASSIUM SERPL-SCNC: 3.5 MMOL/L — SIGNIFICANT CHANGE UP (ref 3.5–5.3)
RBC # BLD: 3.16 M/UL — LOW (ref 3.8–5.2)
RBC # FLD: 17.2 % — HIGH (ref 10.3–14.5)
SARS-COV-2 RNA SPEC QL NAA+PROBE: SIGNIFICANT CHANGE UP
SODIUM SERPL-SCNC: 135 MMOL/L — SIGNIFICANT CHANGE UP (ref 135–145)
WBC # BLD: 13.87 K/UL — HIGH (ref 3.8–10.5)
WBC # FLD AUTO: 13.87 K/UL — HIGH (ref 3.8–10.5)

## 2021-01-26 PROCEDURE — 99233 SBSQ HOSP IP/OBS HIGH 50: CPT

## 2021-01-26 PROCEDURE — 99232 SBSQ HOSP IP/OBS MODERATE 35: CPT

## 2021-01-26 RX ORDER — ONDANSETRON 8 MG/1
4 TABLET, FILM COATED ORAL ONCE
Refills: 0 | Status: COMPLETED | OUTPATIENT
Start: 2021-01-26 | End: 2021-01-26

## 2021-01-26 RX ORDER — ALBUTEROL 90 UG/1
2 AEROSOL, METERED ORAL EVERY 6 HOURS
Refills: 0 | Status: DISCONTINUED | OUTPATIENT
Start: 2021-01-26 | End: 2021-03-19

## 2021-01-26 RX ORDER — PANTOPRAZOLE SODIUM 20 MG/1
40 TABLET, DELAYED RELEASE ORAL DAILY
Refills: 0 | Status: DISCONTINUED | OUTPATIENT
Start: 2021-01-26 | End: 2021-02-13

## 2021-01-26 RX ADMIN — Medication 1 PACKET(S): at 05:09

## 2021-01-26 RX ADMIN — Medication 1 PACKET(S): at 18:10

## 2021-01-26 RX ADMIN — ZINC SULFATE TAB 220 MG (50 MG ZINC EQUIVALENT) 220 MILLIGRAM(S): 220 (50 ZN) TAB at 11:56

## 2021-01-26 RX ADMIN — ALBUTEROL 2 PUFF(S): 90 AEROSOL, METERED ORAL at 20:55

## 2021-01-26 RX ADMIN — AMLODIPINE BESYLATE 5 MILLIGRAM(S): 2.5 TABLET ORAL at 05:09

## 2021-01-26 RX ADMIN — Medication 1 APPLICATION(S): at 18:11

## 2021-01-26 RX ADMIN — Medication 50 MILLIGRAM(S): at 05:09

## 2021-01-26 RX ADMIN — ALBUTEROL 2 PUFF(S): 90 AEROSOL, METERED ORAL at 14:41

## 2021-01-26 RX ADMIN — ENOXAPARIN SODIUM 100 MILLIGRAM(S): 100 INJECTION SUBCUTANEOUS at 05:09

## 2021-01-26 RX ADMIN — Medication 500 MILLIGRAM(S): at 11:56

## 2021-01-26 RX ADMIN — ONDANSETRON 4 MILLIGRAM(S): 8 TABLET, FILM COATED ORAL at 13:09

## 2021-01-26 RX ADMIN — Medication 15 MILLILITER(S): at 11:56

## 2021-01-26 RX ADMIN — Medication 1 TABLET(S): at 05:08

## 2021-01-26 RX ADMIN — Medication 1 TABLET(S): at 18:10

## 2021-01-26 RX ADMIN — PANTOPRAZOLE SODIUM 40 MILLIGRAM(S): 20 TABLET, DELAYED RELEASE ORAL at 11:56

## 2021-01-26 RX ADMIN — Medication 1 APPLICATION(S): at 05:09

## 2021-01-26 RX ADMIN — ENOXAPARIN SODIUM 100 MILLIGRAM(S): 100 INJECTION SUBCUTANEOUS at 18:10

## 2021-01-26 RX ADMIN — CHLORHEXIDINE GLUCONATE 1 APPLICATION(S): 213 SOLUTION TOPICAL at 11:56

## 2021-01-26 RX ADMIN — Medication 50 MILLIGRAM(S): at 18:10

## 2021-01-26 RX ADMIN — Medication 650 MILLIGRAM(S): at 05:08

## 2021-01-26 NOTE — PROGRESS NOTE ADULT - ASSESSMENT
59 yo woman with AARON, h/o Lockett's procedure, s/p reversal 2011 who was admitted to [facility] 11/17 with covid pneumonia.  She received course of remdesevir and dexamethasone; required intubation 11/23 for acute hypoxemic respiratory failure. Hospital course complicated by Proteus bacteremia, MSSA bacteremia/ pneumonia, and Stenotrophomonas respiratory infection, as well as ATN requiring CVVHD --> HD ---> off dialysis.  CT chest 12/4 new large consolidation with cavitation in right lung.  Blood culture 11/27 MSSA and proteus; 11/28 MSSA.     Blood cultures 12/1, 12/11, 12/21, 12/27 no growth.  Sputum culture 12/1, 12/12, 12/27, 12/29, 1/9 Stenotrophomonas.    Multifocal covid pneumonia with respiratory failure, MSSA bacteremia 11/27 with cavitary pneumonia, Proteus bacteremia 11/27, Stenotrophomonas pneumonia.  s/p prolonged antibiotics for bacteremia, pneumonia, aspiration.  s/p trach 12/18.  12/29 Fever, blood tinged sputum and Stenotrophomonas in sputum.  meropenem changed to ceftazidime  --> 1/5 12/31 trach leak.  s/p OR 12/31 for trach change with CT surgery.     1/11 IR gastrostomy placement   1/12 fever pseudomonas and Stenotrophomonas in sputum. received course of ceftazidime, flagyl, vanco.    1/25 fever 102 and leucocytosis wbc 18K    1/26 afebrile off antibiotics wbc 13K     Suggest:   continue to observe off antibiotics for now  follow cultures  if febrile check CT chest    Mila Burgos MD  Pager: [phone]  After 5 PM or weekends please call fellow on call or office [phone]

## 2021-01-26 NOTE — PROGRESS NOTE ADULT - ATTENDING COMMENTS
Agree with plan as outlined above. Patient seen and examined at bedside. Patient history, laboratory data, and imaging personally reviewed.    Pt is a 60F with PMHX as above presenting to [facility] for hypoxemic respiratory failure with ARDS 2/2 COVID19 PNA further c/b superimposed MSSA cavitary PNA and ARF requiring HD. Pt with failure to wean from mechanical ventilation, s/p tracheostomy placement 12/18 and transfer to RCU 12/28.     Pt now with improving encephalopathy, able to communicate and answer basic questions/follow commands. Remains off all IV sedation since 12/24. PO sedation held following trach exchange. CT Head (-) 12/12.  EEG (-). No indication for further neurologic w/u at this time given clinical improvement. OOB to chair as tolerated. Moves all 4 extremities independently, but remains physically severely deconditioned.     Pt with trach placement (CTSx, 12/18 Proximal 7XLT and then changed to 8Fr Bivona 12/31 for persistent air leak), now with resolution of air leak and significant improvement in ventilator synchrony. Daily SBTs as tolerated. Pt tolerates short intervals of SBT at 15/5, 40%. Will c/w airway clearance therapy and trach care as per RCU team. O2 saturations remain normal and pt did not exhibit new s/s respiratory distress, but continues to have thick copious secretions. IPV added to airway clearance regimen. Will CTM carefully.     Pt with hx MSSA bacteremia with cavitary PNA s/p completion of Abx with Primaxin on 12/28 followed by course of Ceftazidime through 1/5 for Stenotrophomonas PNA. Pt with recurrence of fevers and (+) sputum cx for Stenotrophomonas and Pseudomonas 1/12 s/p completion of Flagyl+Ceftazidime+Vancomycin with clinical improvement. Pt with low grade fevers, cx sent. Currently asymptomatic and afebrile. Will monitor. Will repeat CT Chest for evaluation of prior cavitary PNA.     Pt with oropharyngeal dysphagia, IR guided PEG successfully placed 1/11. Pt tolerating PEG feeds. Pt also initially with ARF on CRRT, following by intermittent HD (last session 12/17) with renal recovery. NPH+HISS for glucose control. Wound consult and plastic sx recs appreciated.    Dispo pending medical optimization.

## 2021-01-26 NOTE — PROGRESS NOTE ADULT - SUBJECTIVE AND OBJECTIVE BOX
Follow Up:      Inverval History/ROS:Patient is a 60y old  Female who presents with a chief complaint of Transfer from [facility] (22 Dec 2020 08:13)    spiked 102 night before last.  afebrile since.  denies complaint.          Allergies    Kiwi (Unknown)  latex (Anaphylaxis)  latex (Unknown)  penicillin (Other)  penicillin (Unknown)  perfume  hives (Other)  potassium acetate (Other)  soap additives hives (Other)    Intolerances        ANTIMICROBIALS:       MEDICATIONS  (STANDING):  acetaminophen    Suspension .. 650 every 6 hours PRN  amLODIPine   Tablet 5 daily  enoxaparin Injectable 100 every 12 hours  insulin lispro (ADMELOG) corrective regimen sliding scale  every 6 hours  metoprolol tartrate 50 two times a day  metoprolol tartrate Injectable 5 every 6 hours  pantoprazole  Injectable 40 daily  psyllium Powder 1 two times a day    Vital Signs Last 24 Hrs  T(F): 98.8 (01-26-21 @ 12:00), Max: 99.1 (01-25-21 @ 23:05)  HR: 101 (01-26-21 @ 14:57)  BP: 134/72 (01-26-21 @ 12:00)  RR: 22 (01-26-21 @ 12:00)  SpO2: 100% (01-26-21 @ 14:57) (98% - 100%)    PHYSICAL EXAM:  General: [x ] trach, non toxic, communicative  ENT:  eschar chin  Cardiovascular:   distant  Respiratory:  clear ant  GI:  [x ] soft, scars, hernia, gastrostomy tube   :  [ ] negro   Musculoskeletal: wiggles toes, moves arms  Neurologic: moves all extremities  Skin:   no rash  Psychiatric: alert, awake  Lines: arrow both arms                          8.8    13.87 )-----------( 362      ( 26 Jan 2021 06:50 )             30.5 01-26    135  |  96  |  8   ----------------------------<  137  3.5   |  28  |  0.23  Ca    8.4      26 Jan 2021 06:57Mg     1.6     01-26        MICROBIOLOGY:  1/25 blood culture x 2 testing  1/25 sputum testing    ulCOVID-19 PCR . (01.24.21 @ 15:01)   COVID-19 PCR: NotDetec: Culture - Blood (01.12.21 @ 18:55)   Specimen Source: .Blood Blood-Venous   Culture Results:   No Growth Final Culture - Blood (01.12.21 @ 18:50)   Specimen Source: .Blood Blood-Peripheral   Culture Results:   No Growth Final     1/12 blood cultures x 2 no growth to date    cuCulture - Sputum . (01.09.21 @ 18:30)   - Amikacin: S <=16   - Aztreonam: I 16   - Cefepime: S 8   - Ceftazidime: S 4   - Ceftazidime: S 4   - Ciprofloxacin: S 0.5   - Gentamicin: S <=2   - Imipenem: R >8   - Levofloxacin: S 2   - Levofloxacin: S <=0.5   - Meropenem: R >8   - Piperacillin/Tazobactam: S 16   - Tobramycin: S <=2   - Trimethoprim/Sulfamethoxazole: S <=0.5/9.5   Gram Stain:   Few polymorphonuclear leukocytes per low power field   No Squamous epithelial cells per low power field   Numerous Gram Negative Rods seen per oil power field   Specimen Source: .Sputum Sputum   Culture Results:   Numerous Pseudomonas aeruginosa (Carbapenem Resistant)   Numerous Stenotrophomonas maltophilia   Normal Respiratory Jocelynn absent   Organism Identification: Pseudomonas aeruginosa (Carbapenem Resistant)   Stenotrophomonas maltophilia     RADIOLOGY:    none recnet

## 2021-01-26 NOTE — PROGRESS NOTE ADULT - ASSESSMENT
59 YO F with PMHx of HTN, DVT on Xarelto, Peritonitis s/p Oral's Procedure and Ileostomy (reversed in 2011) and AARON who presented to [facility] on 11/18 for AHRF second to COVID 19, intubated on 11/23 complicated by lung abscesses on CT CHEST 12/5, multi-bacterial PNA and bacteremia, ARTEMIO s/p CRRT and HD, and s/p Tracheostomy 12/18. Transferred to RCU for further management.     # AMS   - AOX4 at baseline.   - CTH and EEG WNL   - Now weaned off all sedation.   - Monitor mentation and supportive care   - Alert/follows commands     # ARDS second to COVID vs Multi-bacterial PNA/ R Lung Abscess  - Intubated 11/23 and course complicated with multi-bacterial PNA and lung abscesses.   - Lung abscess presented on CT CHEST 12/5  - s/p Tracheostomy on 12/18 by CTSx. Sutures out 1/1/2021  - Air leak noted and s/p Trach Exchange for Bivona with CTSx 12/31  - Continue on AC vent and PS as tolerated. Tolerated PS for 1 hr today 1/22  - Proventil, Chest PT and Suction PRN. Trach Care QD.     # Vasoplegic vs Septic Shock   - Weaned off pressors. Monitor BP on Norvasc and Metoprolol.   - On full AC for Hx of DVT. CTA CHEST with no PEs.     # ARTEMIO s/p CRRT and HD   - ARTEMIO now resolved and no longer required HD.   - Monitor renal function and avoid nephrotoxins.     # Dysphagia   - Initially CTSx and GI deferred PEG given Ventral Hernia   - s/p PEG placement with IR 1/11   - Nauseous episodes noted and s/p Reglan PRN. Continue on TF and monitor tolerance.   - No new sx /tolerating feeds     # Sepsis second to COVID vs Multi-bacterial PNA/ Bacteremia and R Lung Abscess   - COVID with superimposed multi-bacterial VAP vs aspiration PNA vs cavitary PNA.   - SCx (11/24) MSSA and BCx (11/27) with MSSA and Proteus Bacteremia  - SCx (12/1, 12/12 and 12/27) with Stenotrophomonas.   - SCx (1/9) with  Pseudomonas and Stenotrophomonas   - BCX has been persistently negative from 12/1, and most recently 1/12   - CT CHEST 12/4 with right lung cavitation.   - s/p multiple antibiotics, but now on Fortaz and Flagyl (1/12-1/16)   - Monitor off antibiotics.   - WBC elevated - 1/25 Febrile overnight to 102. Blood/urine cx sent. If febrile again will start on ceftazadine.     # DM2   - Continue on ISS and NPH and monitor FS.   - Adjust insulin as needed.     # Hx of DVT   - Continue on Lovenox FULL DOSE for Xarelto for now.     # Skin/ Facial Wounds   - WOC recommendations appreciated   - Plastics evaluation and recommendations of facial wound appreciated     # DVT/ GI PPX with FULL AC/ PPI   # CODE STATUS - FULL CODE   # DISPO - Rehab likely Vent facility. COVID swab from 1/24 neg   59 YO F with PMHx of HTN, DVT on Xarelto, Peritonitis s/p Oral's Procedure and Ileostomy (reversed in 2011) and AARON who presented to [facility] on 11/18 for AHRF second to COVID 19, intubated on 11/23 complicated by lung abscesses on CT CHEST 12/5, multi-bacterial PNA and bacteremia, ARTEMIO s/p CRRT and HD, and s/p Tracheostomy 12/18. Transferred to RCU for further management.     # AMS   - AOX4 at baseline.   - CTH and EEG WNL   - Now weaned off all sedation.   - Monitor mentation and supportive care   - Alert/follows commands     # ARDS second to COVID vs Multi-bacterial PNA/ R Lung Abscess  - Intubated 11/23 and course complicated with multi-bacterial PNA and lung abscesses.   - Lung abscess presented on CT CHEST 12/5  - s/p Tracheostomy on 12/18 by CTSx. Sutures out 1/1/2021  - Air leak noted and s/p Trach Exchange for Bivona with CTSx 12/31  - Continue on AC vent and PS as tolerated. Tolerated PS for 1 hr today 1/22  - Proventil, Chest PT and Suction PRN. Trach Care QD.     # Vasoplegic vs Septic Shock   - Weaned off pressors. Monitor BP on Norvasc and Metoprolol.   - On full AC for Hx of DVT. CTA CHEST with no PEs.     # ARTEMIO s/p CRRT and HD   - ARTEMIO now resolved and no longer required HD.   - Monitor renal function and avoid nephrotoxins.     # Dysphagia   - Initially CTSx and GI deferred PEG given Ventral Hernia   - s/p PEG placement with IR 1/11   - Nauseous episodes noted and s/p Reglan PRN. Continue on TF and monitor tolerance.   - No new sx /tolerating feeds     # Sepsis second to COVID vs Multi-bacterial PNA/ Bacteremia and R Lung Abscess   - COVID with superimposed multi-bacterial VAP vs aspiration PNA vs cavitary PNA.   - SCx (11/24) MSSA and BCx (11/27) with MSSA and Proteus Bacteremia  - SCx (12/1, 12/12 and 12/27) with Stenotrophomonas.   - SCx (1/9) with  Pseudomonas and Stenotrophomonas   - BCX has been persistently negative from 12/1, and most recently 1/12   - CT CHEST 12/4 with right lung cavitation.   - s/p multiple antibiotics, but now on Fortaz and Flagyl (1/12-1/16)   - Monitor off antibiotics.   - No longer febrile and WBC downtrending. Blood cx NTD. Sputum growing gram neg but known hx of pseudo  tracheitis.  - CT chest ordered as has significant sputum production with cavitation on previous ct    # DM2   - Continue on ISS and NPH and monitor FS.   - Adjust insulin as needed.     # Hx of DVT   - Continue on Lovenox FULL DOSE for Xarelto for now.     # Skin/ Facial Wounds   - WOC recommendations appreciated   - Plastics evaluation and recommendations of facial wound appreciated     # DVT/ GI PPX with FULL AC/ PPI   # CODE STATUS - FULL CODE   # DISPO - Rehab likely Vent facility. COVID swab from 1/24 neg

## 2021-01-26 NOTE — PROGRESS NOTE ADULT - SUBJECTIVE AND OBJECTIVE BOX
CHIEF COMPLAINT: Patient is a 60y old Female who presents with a chief complaint of Transfer from [facility].    Interval Events:    REVIEW OF SYSTEMS:  [ ] All other systems negative  [ ] Unable to assess ROS because ________    OBJECTIVE:  ICU Vital Signs Last 24 Hrs  T(C): 37.1 (26 Jan 2021 05:04), Max: 37.3 (25 Jan 2021 23:05)  T(F): 98.7 (26 Jan 2021 05:04), Max: 99.1 (25 Jan 2021 23:05)  HR: 84 (26 Jan 2021 06:48) (84 - 100)  BP: 133/57 (26 Jan 2021 05:04) (124/64 - 144/83)  RR: 24 (26 Jan 2021 05:04) (22 - 26)  SpO2: 100% (26 Jan 2021 06:48) (98% - 100%)    Mode: AC/ CMV (Assist Control/ Continuous Mandatory Ventilation), RR (machine): 12, TV (machine): 450, FiO2: 40, PEEP: 5, ITime: 0.9, MAP: 12, PIP: 28    01-25 @ 07:01  -  01-26 @ 07:00  --------------------------------------------------------  IN: 2680 mL / OUT: 1300 mL / NET: 1380 mL      CAPILLARY BLOOD GLUCOSE      POCT Blood Glucose.: 131 mg/dL (26 Jan 2021 06:32)      HOSPITAL MEDICATIONS:  MEDICATIONS  (STANDING):  amLODIPine   Tablet 5 milliGRAM(s) Oral daily  ascorbic acid 500 milliGRAM(s) Oral daily  chlorhexidine 4% Liquid 1 Application(s) Topical daily  collagenase Ointment 1 Application(s) Topical two times a day  enoxaparin Injectable 100 milliGRAM(s) SubCutaneous every 12 hours  insulin lispro (ADMELOG) corrective regimen sliding scale   SubCutaneous every 6 hours  lactobacillus acidophilus 1 Tablet(s) Oral two times a day  metoprolol tartrate 50 milliGRAM(s) Oral two times a day  multivitamin/minerals/iron Oral Solution (CENTRUM) 15 milliLiter(s) Oral daily  pantoprazole  Injectable 40 milliGRAM(s) IV Push daily  psyllium Powder 1 Packet(s) Oral two times a day  zinc sulfate 220 milliGRAM(s) Oral daily    MEDICATIONS  (PRN):  acetaminophen    Suspension .. 650 milliGRAM(s) Oral every 6 hours PRN Temp greater or equal to 38C (100.4F), Mild Pain (1 - 3), Moderate Pain (4 - 6)  oxyCODONE    Solution 5 milliGRAM(s) Oral every 4 hours PRN Severe Pain (7 - 10)      LABS:                        8.8    13.87 )-----------( 362      ( 26 Jan 2021 06:50 )             30.5     01-26    135  |  96<L>  |  8   ----------------------------<  137<H>  3.5   |  28  |  0.23<L>    Ca    8.4      26 Jan 2021 06:57  Mg     1.6     01-26      MICROBIOLOGY:     RADIOLOGY:  [ ] Reviewed and interpreted by me    PULMONARY FUNCTION TESTS:    EKG: CHIEF COMPLAINT: Patient is a 60y old Female who presents with a chief complaint of Transfer from [facility].    Interval Events: Worsening cough and sputum production.     REVIEW OF SYSTEMS:  Complaining of cough and some SOB. Denies CP, abd pain, N/V.  [x] All other systems negative      OBJECTIVE:  ICU Vital Signs Last 24 Hrs  T(C): 37.1 (26 Jan 2021 05:04), Max: 37.3 (25 Jan 2021 23:05)  T(F): 98.7 (26 Jan 2021 05:04), Max: 99.1 (25 Jan 2021 23:05)  HR: 84 (26 Jan 2021 06:48) (84 - 100)  BP: 133/57 (26 Jan 2021 05:04) (124/64 - 144/83)  RR: 24 (26 Jan 2021 05:04) (22 - 26)  SpO2: 100% (26 Jan 2021 06:48) (98% - 100%)    Mode: AC/ CMV (Assist Control/ Continuous Mandatory Ventilation), RR (machine): 12, TV (machine): 450, FiO2: 40, PEEP: 5, ITime: 0.9, MAP: 12, PIP: 28    01-25 @ 07:01  -  01-26 @ 07:00  --------------------------------------------------------  IN: 2680 mL / OUT: 1300 mL / NET: 1380 mL      CAPILLARY BLOOD GLUCOSE      POCT Blood Glucose.: 131 mg/dL (26 Jan 2021 06:32)      HOSPITAL MEDICATIONS:  MEDICATIONS  (STANDING):  amLODIPine   Tablet 5 milliGRAM(s) Oral daily  ascorbic acid 500 milliGRAM(s) Oral daily  chlorhexidine 4% Liquid 1 Application(s) Topical daily  collagenase Ointment 1 Application(s) Topical two times a day  enoxaparin Injectable 100 milliGRAM(s) SubCutaneous every 12 hours  insulin lispro (ADMELOG) corrective regimen sliding scale   SubCutaneous every 6 hours  lactobacillus acidophilus 1 Tablet(s) Oral two times a day  metoprolol tartrate 50 milliGRAM(s) Oral two times a day  multivitamin/minerals/iron Oral Solution (CENTRUM) 15 milliLiter(s) Oral daily  pantoprazole  Injectable 40 milliGRAM(s) IV Push daily  psyllium Powder 1 Packet(s) Oral two times a day  zinc sulfate 220 milliGRAM(s) Oral daily    MEDICATIONS  (PRN):  acetaminophen    Suspension .. 650 milliGRAM(s) Oral every 6 hours PRN Temp greater or equal to 38C (100.4F), Mild Pain (1 - 3), Moderate Pain (4 - 6)  oxyCODONE    Solution 5 milliGRAM(s) Oral every 4 hours PRN Severe Pain (7 - 10)      LABS:                        8.8    13.87 )-----------( 362      ( 26 Jan 2021 06:50 )             30.5     01-26    135  |  96<L>  |  8   ----------------------------<  137<H>  3.5   |  28  |  0.23<L>    Ca    8.4      26 Jan 2021 06:57  Mg     1.6     01-26      MICROBIOLOGY:     RADIOLOGY:  [ ] Reviewed and interpreted by me    PULMONARY FUNCTION TESTS:    EKG: CHIEF COMPLAINT: Patient is a 60y old Female who presents with a chief complaint of Transfer from [facility].    Interval Events: Worsening cough and sputum production.     REVIEW OF SYSTEMS:  Complaining of cough and some SOB. Denies CP, abd pain, N/V.  [x] All other systems negative    OBJECTIVE:  ICU Vital Signs Last 24 Hrs  T(C): 37.1 (26 Jan 2021 05:04), Max: 37.3 (25 Jan 2021 23:05)  T(F): 98.7 (26 Jan 2021 05:04), Max: 99.1 (25 Jan 2021 23:05)  HR: 84 (26 Jan 2021 06:48) (84 - 100)  BP: 133/57 (26 Jan 2021 05:04) (124/64 - 144/83)  RR: 24 (26 Jan 2021 05:04) (22 - 26)  SpO2: 100% (26 Jan 2021 06:48) (98% - 100%)    Mode: AC/ CMV (Assist Control/ Continuous Mandatory Ventilation), RR (machine): 12, TV (machine): 450, FiO2: 40, PEEP: 5, ITime: 0.9, MAP: 12, PIP: 28    01-25 @ 07:01  -  01-26 @ 07:00  --------------------------------------------------------  IN: 2680 mL / OUT: 1300 mL / NET: 1380 mL      CAPILLARY BLOOD GLUCOSE      POCT Blood Glucose.: 131 mg/dL (26 Jan 2021 06:32)      HOSPITAL MEDICATIONS:  MEDICATIONS  (STANDING):  amLODIPine   Tablet 5 milliGRAM(s) Oral daily  ascorbic acid 500 milliGRAM(s) Oral daily  chlorhexidine 4% Liquid 1 Application(s) Topical daily  collagenase Ointment 1 Application(s) Topical two times a day  enoxaparin Injectable 100 milliGRAM(s) SubCutaneous every 12 hours  insulin lispro (ADMELOG) corrective regimen sliding scale   SubCutaneous every 6 hours  lactobacillus acidophilus 1 Tablet(s) Oral two times a day  metoprolol tartrate 50 milliGRAM(s) Oral two times a day  multivitamin/minerals/iron Oral Solution (CENTRUM) 15 milliLiter(s) Oral daily  pantoprazole  Injectable 40 milliGRAM(s) IV Push daily  psyllium Powder 1 Packet(s) Oral two times a day  zinc sulfate 220 milliGRAM(s) Oral daily    MEDICATIONS  (PRN):  acetaminophen    Suspension .. 650 milliGRAM(s) Oral every 6 hours PRN Temp greater or equal to 38C (100.4F), Mild Pain (1 - 3), Moderate Pain (4 - 6)  oxyCODONE    Solution 5 milliGRAM(s) Oral every 4 hours PRN Severe Pain (7 - 10)      LABS:                        8.8    13.87 )-----------( 362      ( 26 Jan 2021 06:50 )             30.5     01-26    135  |  96<L>  |  8   ----------------------------<  137<H>  3.5   |  28  |  0.23<L>    Ca    8.4      26 Jan 2021 06:57  Mg     1.6     01-26      MICROBIOLOGY:     RADIOLOGY:  [ ] Reviewed and interpreted by me    PULMONARY FUNCTION TESTS:    EKG:

## 2021-01-27 LAB
-  AMIKACIN: SIGNIFICANT CHANGE UP
-  AZTREONAM: SIGNIFICANT CHANGE UP
-  CEFEPIME: SIGNIFICANT CHANGE UP
-  CEFTAZIDIME: SIGNIFICANT CHANGE UP
-  CIPROFLOXACIN: SIGNIFICANT CHANGE UP
-  GENTAMICIN: SIGNIFICANT CHANGE UP
-  IMIPENEM: SIGNIFICANT CHANGE UP
-  LEVOFLOXACIN: SIGNIFICANT CHANGE UP
-  MEROPENEM: SIGNIFICANT CHANGE UP
-  PIPERACILLIN/TAZOBACTAM: SIGNIFICANT CHANGE UP
-  TOBRAMYCIN: SIGNIFICANT CHANGE UP
ANION GAP SERPL CALC-SCNC: 10 MMOL/L — SIGNIFICANT CHANGE UP (ref 7–14)
BASOPHILS # BLD AUTO: 0.06 K/UL — SIGNIFICANT CHANGE UP (ref 0–0.2)
BASOPHILS NFR BLD AUTO: 0.5 % — SIGNIFICANT CHANGE UP (ref 0–2)
BUN SERPL-MCNC: 9 MG/DL — SIGNIFICANT CHANGE UP (ref 7–23)
CALCIUM SERPL-MCNC: 8.3 MG/DL — LOW (ref 8.4–10.5)
CHLORIDE SERPL-SCNC: 95 MMOL/L — LOW (ref 98–107)
CO2 SERPL-SCNC: 30 MMOL/L — SIGNIFICANT CHANGE UP (ref 22–31)
CREAT SERPL-MCNC: 0.25 MG/DL — LOW (ref 0.5–1.3)
EOSINOPHIL # BLD AUTO: 0.41 K/UL — SIGNIFICANT CHANGE UP (ref 0–0.5)
EOSINOPHIL NFR BLD AUTO: 3.6 % — SIGNIFICANT CHANGE UP (ref 0–6)
GLUCOSE BLDC GLUCOMTR-MCNC: 110 MG/DL — HIGH (ref 70–99)
GLUCOSE BLDC GLUCOMTR-MCNC: 110 MG/DL — HIGH (ref 70–99)
GLUCOSE BLDC GLUCOMTR-MCNC: 119 MG/DL — HIGH (ref 70–99)
GLUCOSE BLDC GLUCOMTR-MCNC: 133 MG/DL — HIGH (ref 70–99)
GLUCOSE SERPL-MCNC: 124 MG/DL — HIGH (ref 70–99)
HCT VFR BLD CALC: 28.4 % — LOW (ref 34.5–45)
HGB BLD-MCNC: 8.3 G/DL — LOW (ref 11.5–15.5)
IANC: 8.05 K/UL — SIGNIFICANT CHANGE UP (ref 1.5–8.5)
IMM GRANULOCYTES NFR BLD AUTO: 1.7 % — HIGH (ref 0–1.5)
LYMPHOCYTES # BLD AUTO: 18.2 % — SIGNIFICANT CHANGE UP (ref 13–44)
LYMPHOCYTES # BLD AUTO: 2.08 K/UL — SIGNIFICANT CHANGE UP (ref 1–3.3)
MAGNESIUM SERPL-MCNC: 1.5 MG/DL — LOW (ref 1.6–2.6)
MCHC RBC-ENTMCNC: 28.2 PG — SIGNIFICANT CHANGE UP (ref 27–34)
MCHC RBC-ENTMCNC: 29.2 GM/DL — LOW (ref 32–36)
MCV RBC AUTO: 96.6 FL — SIGNIFICANT CHANGE UP (ref 80–100)
METHOD TYPE: SIGNIFICANT CHANGE UP
MONOCYTES # BLD AUTO: 0.66 K/UL — SIGNIFICANT CHANGE UP (ref 0–0.9)
MONOCYTES NFR BLD AUTO: 5.8 % — SIGNIFICANT CHANGE UP (ref 2–14)
NEUTROPHILS # BLD AUTO: 8.05 K/UL — HIGH (ref 1.8–7.4)
NEUTROPHILS NFR BLD AUTO: 70.2 % — SIGNIFICANT CHANGE UP (ref 43–77)
NRBC # BLD: 0 /100 WBCS — SIGNIFICANT CHANGE UP
NRBC # FLD: 0 K/UL — SIGNIFICANT CHANGE UP
PHOSPHATE SERPL-MCNC: 4.1 MG/DL — SIGNIFICANT CHANGE UP (ref 2.5–4.5)
PLATELET # BLD AUTO: 351 K/UL — SIGNIFICANT CHANGE UP (ref 150–400)
POTASSIUM SERPL-MCNC: 3.5 MMOL/L — SIGNIFICANT CHANGE UP (ref 3.5–5.3)
POTASSIUM SERPL-SCNC: 3.5 MMOL/L — SIGNIFICANT CHANGE UP (ref 3.5–5.3)
RBC # BLD: 2.94 M/UL — LOW (ref 3.8–5.2)
RBC # FLD: 17 % — HIGH (ref 10.3–14.5)
SODIUM SERPL-SCNC: 135 MMOL/L — SIGNIFICANT CHANGE UP (ref 135–145)
WBC # BLD: 11.45 K/UL — HIGH (ref 3.8–10.5)
WBC # FLD AUTO: 11.45 K/UL — HIGH (ref 3.8–10.5)

## 2021-01-27 PROCEDURE — 99233 SBSQ HOSP IP/OBS HIGH 50: CPT

## 2021-01-27 RX ORDER — MAGNESIUM SULFATE 500 MG/ML
2 VIAL (ML) INJECTION ONCE
Refills: 0 | Status: COMPLETED | OUTPATIENT
Start: 2021-01-27 | End: 2021-01-27

## 2021-01-27 RX ADMIN — Medication 50 MILLIGRAM(S): at 16:18

## 2021-01-27 RX ADMIN — PANTOPRAZOLE SODIUM 40 MILLIGRAM(S): 20 TABLET, DELAYED RELEASE ORAL at 11:48

## 2021-01-27 RX ADMIN — OXYCODONE HYDROCHLORIDE 5 MILLIGRAM(S): 5 TABLET ORAL at 10:05

## 2021-01-27 RX ADMIN — Medication 1 TABLET(S): at 05:38

## 2021-01-27 RX ADMIN — Medication 1 APPLICATION(S): at 16:17

## 2021-01-27 RX ADMIN — OXYCODONE HYDROCHLORIDE 5 MILLIGRAM(S): 5 TABLET ORAL at 18:12

## 2021-01-27 RX ADMIN — Medication 15 MILLILITER(S): at 11:48

## 2021-01-27 RX ADMIN — Medication 1 PACKET(S): at 05:39

## 2021-01-27 RX ADMIN — Medication 1 TABLET(S): at 16:18

## 2021-01-27 RX ADMIN — ENOXAPARIN SODIUM 100 MILLIGRAM(S): 100 INJECTION SUBCUTANEOUS at 16:17

## 2021-01-27 RX ADMIN — ALBUTEROL 2 PUFF(S): 90 AEROSOL, METERED ORAL at 03:17

## 2021-01-27 RX ADMIN — ALBUTEROL 2 PUFF(S): 90 AEROSOL, METERED ORAL at 22:08

## 2021-01-27 RX ADMIN — Medication 50 GRAM(S): at 10:20

## 2021-01-27 RX ADMIN — Medication 1 PACKET(S): at 16:18

## 2021-01-27 RX ADMIN — Medication 500 MILLIGRAM(S): at 11:47

## 2021-01-27 RX ADMIN — ENOXAPARIN SODIUM 100 MILLIGRAM(S): 100 INJECTION SUBCUTANEOUS at 05:39

## 2021-01-27 RX ADMIN — OXYCODONE HYDROCHLORIDE 5 MILLIGRAM(S): 5 TABLET ORAL at 03:38

## 2021-01-27 RX ADMIN — Medication 50 MILLIGRAM(S): at 05:38

## 2021-01-27 RX ADMIN — Medication 1 APPLICATION(S): at 05:39

## 2021-01-27 RX ADMIN — AMLODIPINE BESYLATE 5 MILLIGRAM(S): 2.5 TABLET ORAL at 05:38

## 2021-01-27 RX ADMIN — ZINC SULFATE TAB 220 MG (50 MG ZINC EQUIVALENT) 220 MILLIGRAM(S): 220 (50 ZN) TAB at 11:47

## 2021-01-27 RX ADMIN — ALBUTEROL 2 PUFF(S): 90 AEROSOL, METERED ORAL at 11:36

## 2021-01-27 RX ADMIN — ALBUTEROL 2 PUFF(S): 90 AEROSOL, METERED ORAL at 15:13

## 2021-01-27 RX ADMIN — CHLORHEXIDINE GLUCONATE 1 APPLICATION(S): 213 SOLUTION TOPICAL at 11:47

## 2021-01-27 NOTE — PROGRESS NOTE ADULT - SUBJECTIVE AND OBJECTIVE BOX
CHIEF COMPLAINT: Patient is a 60y old Female who presents with a chief complaint of Transfer from [facility].    Interval Events:    REVIEW OF SYSTEMS:  [ ] All other systems negative  [ ] Unable to assess ROS because ________    OBJECTIVE:  ICU Vital Signs Last 24 Hrs  T(C): 37.1 (27 Jan 2021 05:37), Max: 37.8 (27 Jan 2021 02:02)  T(F): 98.7 (27 Jan 2021 05:37), Max: 100.1 (27 Jan 2021 02:02)  HR: 99 (27 Jan 2021 07:07) (88 - 110)  BP: 121/55 (27 Jan 2021 05:37) (121/55 - 146/72)  RR: 18 (27 Jan 2021 05:37) (18 - 24)  SpO2: 98% (27 Jan 2021 07:07) (98% - 100%)    Mode: AC/ CMV (Assist Control/ Continuous Mandatory Ventilation), RR (machine): 12, TV (machine): 450, FiO2: 40, PEEP: 5, ITime: 0.9, MAP: 12, PIP: 27    01-26 @ 07:01  -  01-27 @ 07:00  --------------------------------------------------------  IN: 2503 mL / OUT: 803 mL / NET: 1700 mL      CAPILLARY BLOOD GLUCOSE      POCT Blood Glucose.: 119 mg/dL (27 Jan 2021 05:31)      HOSPITAL MEDICATIONS:  MEDICATIONS  (STANDING):  ALBUTerol    90 MICROgram(s) HFA Inhaler 2 Puff(s) Inhalation every 6 hours  amLODIPine   Tablet 5 milliGRAM(s) Oral daily  ascorbic acid 500 milliGRAM(s) Oral daily  chlorhexidine 4% Liquid 1 Application(s) Topical daily  collagenase Ointment 1 Application(s) Topical two times a day  enoxaparin Injectable 100 milliGRAM(s) SubCutaneous every 12 hours  insulin lispro (ADMELOG) corrective regimen sliding scale   SubCutaneous every 6 hours  lactobacillus acidophilus 1 Tablet(s) Oral two times a day  metoprolol tartrate 50 milliGRAM(s) Oral two times a day  multivitamin/minerals/iron Oral Solution (CENTRUM) 15 milliLiter(s) Oral daily  pantoprazole   Suspension 40 milliGRAM(s) Oral daily  psyllium Powder 1 Packet(s) Oral two times a day  zinc sulfate 220 milliGRAM(s) Oral daily    MEDICATIONS  (PRN):  acetaminophen    Suspension .. 650 milliGRAM(s) Oral every 6 hours PRN Temp greater or equal to 38C (100.4F), Mild Pain (1 - 3), Moderate Pain (4 - 6)  oxyCODONE    Solution 5 milliGRAM(s) Oral every 4 hours PRN Severe Pain (7 - 10)      LABS:                        8.3    11.45 )-----------( 351      ( 27 Jan 2021 07:09 )             28.4     01-27    135  |  95<L>  |  9   ----------------------------<  124<H>  3.5   |  30  |  0.25<L>    Ca    8.3<L>      27 Jan 2021 07:09  Phos  4.1     01-27  Mg     1.5     01-27      MICROBIOLOGY:     RADIOLOGY:  [ ] Reviewed and interpreted by me    PULMONARY FUNCTION TESTS:    EKG: CHIEF COMPLAINT: Patient is a 60y old Female who presents with a chief complaint of Transfer from [facility].    Interval Events: Noted to be febrile overnight with a lot of secretions.     REVIEW OF SYSTEMS:  Denies SOB, CP, abd pain, n/v.   [x] All other systems negative      OBJECTIVE:  ICU Vital Signs Last 24 Hrs  T(C): 37.1 (27 Jan 2021 05:37), Max: 37.8 (27 Jan 2021 02:02)  T(F): 98.7 (27 Jan 2021 05:37), Max: 100.1 (27 Jan 2021 02:02)  HR: 99 (27 Jan 2021 07:07) (88 - 110)  BP: 121/55 (27 Jan 2021 05:37) (121/55 - 146/72)  RR: 18 (27 Jan 2021 05:37) (18 - 24)  SpO2: 98% (27 Jan 2021 07:07) (98% - 100%)    Mode: AC/ CMV (Assist Control/ Continuous Mandatory Ventilation), RR (machine): 12, TV (machine): 450, FiO2: 40, PEEP: 5, ITime: 0.9, MAP: 12, PIP: 27    01-26 @ 07:01  -  01-27 @ 07:00  --------------------------------------------------------  IN: 2503 mL / OUT: 803 mL / NET: 1700 mL      CAPILLARY BLOOD GLUCOSE      POCT Blood Glucose.: 119 mg/dL (27 Jan 2021 05:31)      HOSPITAL MEDICATIONS:  MEDICATIONS  (STANDING):  ALBUTerol    90 MICROgram(s) HFA Inhaler 2 Puff(s) Inhalation every 6 hours  amLODIPine   Tablet 5 milliGRAM(s) Oral daily  ascorbic acid 500 milliGRAM(s) Oral daily  chlorhexidine 4% Liquid 1 Application(s) Topical daily  collagenase Ointment 1 Application(s) Topical two times a day  enoxaparin Injectable 100 milliGRAM(s) SubCutaneous every 12 hours  insulin lispro (ADMELOG) corrective regimen sliding scale   SubCutaneous every 6 hours  lactobacillus acidophilus 1 Tablet(s) Oral two times a day  metoprolol tartrate 50 milliGRAM(s) Oral two times a day  multivitamin/minerals/iron Oral Solution (CENTRUM) 15 milliLiter(s) Oral daily  pantoprazole   Suspension 40 milliGRAM(s) Oral daily  psyllium Powder 1 Packet(s) Oral two times a day  zinc sulfate 220 milliGRAM(s) Oral daily    MEDICATIONS  (PRN):  acetaminophen    Suspension .. 650 milliGRAM(s) Oral every 6 hours PRN Temp greater or equal to 38C (100.4F), Mild Pain (1 - 3), Moderate Pain (4 - 6)  oxyCODONE    Solution 5 milliGRAM(s) Oral every 4 hours PRN Severe Pain (7 - 10)      LABS:                        8.3    11.45 )-----------( 351      ( 27 Jan 2021 07:09 )             28.4     01-27    135  |  95<L>  |  9   ----------------------------<  124<H>  3.5   |  30  |  0.25<L>    Ca    8.3<L>      27 Jan 2021 07:09  Phos  4.1     01-27  Mg     1.5     01-27      MICROBIOLOGY:     RADIOLOGY:  [ ] Reviewed and interpreted by me    PULMONARY FUNCTION TESTS:    EKG:

## 2021-01-27 NOTE — PROGRESS NOTE ADULT - ATTENDING COMMENTS
Agree with plan as outlined above. Patient seen and examined at bedside. Patient history, laboratory data, and imaging personally reviewed.    Pt is a 60F with PMHX as above presenting to [facility] for hypoxemic respiratory failure with ARDS 2/2 COVID19 PNA further c/b superimposed MSSA cavitary PNA and ARF requiring HD. Pt with failure to wean from mechanical ventilation, s/p tracheostomy placement 12/18 and transfer to RCU 12/28.     Pt now with significantly improving encephalopathy, able to communicate effectively and answer questions/follow commands. Remains off all IV sedation since 12/24. CT Head (-) 12/12. EEG (-). No indication for further neurologic w/u at this time given clinical improvement. OOB to chair as tolerated. Moves all 4 extremities independently, but remains physically severely deconditioned and dependent of ADLs.     Pt with trach placement (CTSx, 12/18 Proximal 7XLT and then changed to 8Fr Bivona 12/31 for persistent air leak), now with resolution of air leak and significant improvement in ventilator synchrony. Daily SBTs as tolerated. Pt tolerates short intervals of SBT at 15/5, 40%. Will c/w airway clearance therapy and trach care as per RCU team. O2 saturations remain normal and pt did not exhibit new s/s respiratory distress, but continues to have worsening thick copious secretions and low-grade fevers. IPV added to airway clearance regimen. Will CTM carefully. Plan for CT Chest tonight.    Pt with hx MSSA bacteremia with cavitary PNA s/p completion of Abx with Primaxin on 12/28 followed by course of Ceftazidime through 1/5 for Stenotrophomonas PNA. Pt with recurrence of fevers and (+) sputum cx for Stenotrophomonas and Pseudomonas 1/12 s/p completion of Flagyl+Ceftazidime+Vancomycin with clinical improvement. Pt with low grade fevers, cx sent. Currently asymptomatic and afebrile. Will monitor. Will repeat CT Chest for evaluation of prior cavitary PNA today.     Pt with oropharyngeal dysphagia, IR guided PEG successfully placed 1/11. Pt tolerating PEG feeds. Pt also initially with ARF on CRRT, following by intermittent HD (last session 12/17) with renal recovery. NPH+HISS for glucose control. Wound consult and plastic sx recs appreciated.    Dispo pending medical optimization, likely [facility].

## 2021-01-27 NOTE — PROGRESS NOTE ADULT - ASSESSMENT
61 YO F with PMHx of HTN, DVT on Xarelto, Peritonitis s/p Oral's Procedure and Ileostomy (reversed in 2011) and AARON who presented to [facility] on 11/18 for AHRF second to COVID 19, intubated on 11/23 complicated by lung abscesses on CT CHEST 12/5, multi-bacterial PNA and bacteremia, ARTEMIO s/p CRRT and HD, and s/p Tracheostomy 12/18. Transferred to RCU for further management.     # AMS   - AOX4 at baseline.   - CTH and EEG WNL   - Now weaned off all sedation.   - Monitor mentation and supportive care   - Alert/follows commands     # ARDS second to COVID vs Multi-bacterial PNA/ R Lung Abscess  - Intubated 11/23 and course complicated with multi-bacterial PNA and lung abscesses.   - Lung abscess presented on CT CHEST 12/5  - s/p Tracheostomy on 12/18 by CTSx. Sutures out 1/1/2021  - Air leak noted and s/p Trach Exchange for Bivona with CTSx 12/31  - Continue on AC vent and PS as tolerated. Tolerated PS for 1 hr today 1/22  - Proventil, Chest PT and Suction PRN. Trach Care QD.     # Vasoplegic vs Septic Shock   - Weaned off pressors. Monitor BP on Norvasc and Metoprolol.   - On full AC for Hx of DVT. CTA CHEST with no PEs.     # ARTEMIO s/p CRRT and HD   - ARTEMIO now resolved and no longer required HD.   - Monitor renal function and avoid nephrotoxins.     # Dysphagia   - Initially CTSx and GI deferred PEG given Ventral Hernia   - s/p PEG placement with IR 1/11   - Nauseous episodes noted and s/p Reglan PRN. Continue on TF and monitor tolerance.   - No new sx /tolerating feeds     # Sepsis second to COVID vs Multi-bacterial PNA/ Bacteremia and R Lung Abscess   - COVID with superimposed multi-bacterial VAP vs aspiration PNA vs cavitary PNA.   - SCx (11/24) MSSA and BCx (11/27) with MSSA and Proteus Bacteremia  - SCx (12/1, 12/12 and 12/27) with Stenotrophomonas.   - SCx (1/9) with  Pseudomonas and Stenotrophomonas   - BCX has been persistently negative from 12/1, and most recently 1/12   - CT CHEST 12/4 with right lung cavitation.   - s/p multiple antibiotics, but now on Fortaz and Flagyl (1/12-1/16)   - Monitor off antibiotics.   - No longer febrile and WBC downtrending. Blood cx NTD. Sputum growing gram neg but known hx of pseudo  tracheitis.  - CT chest ordered as has significant sputum production with cavitation on previous ct    # DM2   - Continue on ISS and NPH and monitor FS.   - Adjust insulin as needed.     # Hx of DVT   - Continue on Lovenox FULL DOSE for Xarelto for now.     # Skin/ Facial Wounds   - WOC recommendations appreciated   - Plastics evaluation and recommendations of facial wound appreciated     # DVT/ GI PPX with FULL AC/ PPI   # CODE STATUS - FULL CODE   # DISPO - Rehab likely Vent facility. COVID swab from 1/24 neg   59 YO F with PMHx of HTN, DVT on Xarelto, Peritonitis s/p Oral's Procedure and Ileostomy (reversed in 2011) and AARON who presented to [facility] on 11/18 for AHRF second to COVID 19, intubated on 11/23 complicated by lung abscesses on CT CHEST 12/5, multi-bacterial PNA and bacteremia, ARTEMIO s/p CRRT and HD, and s/p Tracheostomy 12/18. Transferred to RCU for further management.     # AMS   - AOX4 at baseline.   - CTH and EEG WNL   - Now weaned off all sedation.   - Monitor mentation and supportive care   - Alert/follows commands     # ARDS second to COVID vs Multi-bacterial PNA/ R Lung Abscess  - Intubated 11/23 and course complicated with multi-bacterial PNA and lung abscesses.   - Lung abscess presented on CT CHEST 12/5  - s/p Tracheostomy on 12/18 by CTSx. Sutures out 1/1/2021  - Air leak noted and s/p Trach Exchange for Bivona with CTSx 12/31  - Continue on AC vent and PS as tolerated. Tolerated PS for >5 hr today 1/27  - Proventil, Chest PT and Suction PRN. Trach Care QD.     # Vasoplegic vs Septic Shock   - Weaned off pressors. Monitor BP on Norvasc and Metoprolol.   - On full AC for Hx of DVT. CTA CHEST with no PEs.     # ARTEMIO s/p CRRT and HD   - ARTEMIO now resolved and no longer required HD.   - Monitor renal function and avoid nephrotoxins.     # Dysphagia   - Initially CTSx and GI deferred PEG given Ventral Hernia   - s/p PEG placement with IR 1/11   - Nauseous episodes noted and s/p Reglan PRN. Continue on TF and monitor tolerance.   - No new sx /tolerating feeds     # Sepsis second to COVID vs Multi-bacterial PNA/ Bacteremia and R Lung Abscess   - COVID with superimposed multi-bacterial VAP vs aspiration PNA vs cavitary PNA.   - SCx (11/24) MSSA and BCx (11/27) with MSSA and Proteus Bacteremia  - SCx (12/1, 12/12 and 12/27) with Stenotrophomonas.   - SCx (1/9) with  Pseudomonas and Stenotrophomonas   - BCX has been persistently negative from 12/1, and most recently 1/12   - CT CHEST 12/4 with right lung cavitation.   - s/p multiple antibiotics, but now on Fortaz and Flagyl (1/12-1/16)   - Monitor off antibiotics.   - No longer febrile and WBC downtrending. Blood cx NTD. Sputum growing gram neg but known hx of pseudo  tracheitis.  - CT chest ordered as has significant sputum production with cavitation on previous ct    # DM2   - Continue on ISS and NPH and monitor FS.   - Adjust insulin as needed.     # Hx of DVT   - Continue on Lovenox FULL DOSE for Xarelto for now.     # Skin/ Facial Wounds   - WOC recommendations appreciated   - Plastics evaluation and recommendations of facial wound appreciated     # DVT/ GI PPX with FULL AC/ PPI   # CODE STATUS - FULL CODE   # DISPO - Rehab likely Vent facility.

## 2021-01-28 LAB
-  AMIKACIN: SIGNIFICANT CHANGE UP
-  AZTREONAM: SIGNIFICANT CHANGE UP
-  CEFEPIME: SIGNIFICANT CHANGE UP
-  CEFTAZIDIME: SIGNIFICANT CHANGE UP
-  CIPROFLOXACIN: SIGNIFICANT CHANGE UP
-  GENTAMICIN: SIGNIFICANT CHANGE UP
-  IMIPENEM: SIGNIFICANT CHANGE UP
-  LEVOFLOXACIN: SIGNIFICANT CHANGE UP
-  MEROPENEM: SIGNIFICANT CHANGE UP
-  PIPERACILLIN/TAZOBACTAM: SIGNIFICANT CHANGE UP
-  TOBRAMYCIN: SIGNIFICANT CHANGE UP
-  TRIMETHOPRIM/SULFAMETHOXAZOLE: SIGNIFICANT CHANGE UP
-  TRIMETHOPRIM/SULFAMETHOXAZOLE: SIGNIFICANT CHANGE UP
CULTURE RESULTS: SIGNIFICANT CHANGE UP
CULTURE RESULTS: SIGNIFICANT CHANGE UP
GLUCOSE BLDC GLUCOMTR-MCNC: 101 MG/DL — HIGH (ref 70–99)
GLUCOSE BLDC GLUCOMTR-MCNC: 126 MG/DL — HIGH (ref 70–99)
GLUCOSE BLDC GLUCOMTR-MCNC: 135 MG/DL — HIGH (ref 70–99)
GLUCOSE BLDC GLUCOMTR-MCNC: 151 MG/DL — HIGH (ref 70–99)
METHOD TYPE: SIGNIFICANT CHANGE UP
ORGANISM # SPEC MICROSCOPIC CNT: SIGNIFICANT CHANGE UP
SPECIMEN SOURCE: SIGNIFICANT CHANGE UP
SPECIMEN SOURCE: SIGNIFICANT CHANGE UP

## 2021-01-28 PROCEDURE — 99232 SBSQ HOSP IP/OBS MODERATE 35: CPT

## 2021-01-28 PROCEDURE — 99233 SBSQ HOSP IP/OBS HIGH 50: CPT

## 2021-01-28 PROCEDURE — 71250 CT THORAX DX C-: CPT | Mod: 26,59

## 2021-01-28 RX ORDER — OXYCODONE HYDROCHLORIDE 5 MG/1
5 TABLET ORAL EVERY 4 HOURS
Refills: 0 | Status: DISCONTINUED | OUTPATIENT
Start: 2021-01-28 | End: 2021-02-04

## 2021-01-28 RX ORDER — TOBRAMYCIN SULFATE 40 MG/ML
300 VIAL (ML) INJECTION EVERY 12 HOURS
Refills: 0 | Status: COMPLETED | OUTPATIENT
Start: 2021-01-28 | End: 2021-02-06

## 2021-01-28 RX ORDER — CEFTAZIDIME 6 G/30ML
2 INJECTION, POWDER, FOR SOLUTION INTRAVENOUS EVERY 8 HOURS
Refills: 0 | Status: COMPLETED | OUTPATIENT
Start: 2021-01-28 | End: 2021-02-06

## 2021-01-28 RX ORDER — SODIUM BICARBONATE 1 MEQ/ML
650 SYRINGE (ML) INTRAVENOUS ONCE
Refills: 0 | Status: COMPLETED | OUTPATIENT
Start: 2021-01-28 | End: 2021-01-30

## 2021-01-28 RX ADMIN — ZINC SULFATE TAB 220 MG (50 MG ZINC EQUIVALENT) 220 MILLIGRAM(S): 220 (50 ZN) TAB at 11:23

## 2021-01-28 RX ADMIN — ALBUTEROL 2 PUFF(S): 90 AEROSOL, METERED ORAL at 03:25

## 2021-01-28 RX ADMIN — Medication 1 PACKET(S): at 17:18

## 2021-01-28 RX ADMIN — ENOXAPARIN SODIUM 100 MILLIGRAM(S): 100 INJECTION SUBCUTANEOUS at 17:17

## 2021-01-28 RX ADMIN — Medication 1 APPLICATION(S): at 17:17

## 2021-01-28 RX ADMIN — Medication 500 MILLIGRAM(S): at 11:23

## 2021-01-28 RX ADMIN — OXYCODONE HYDROCHLORIDE 5 MILLIGRAM(S): 5 TABLET ORAL at 06:43

## 2021-01-28 RX ADMIN — PANTOPRAZOLE SODIUM 40 MILLIGRAM(S): 20 TABLET, DELAYED RELEASE ORAL at 11:24

## 2021-01-28 RX ADMIN — ENOXAPARIN SODIUM 100 MILLIGRAM(S): 100 INJECTION SUBCUTANEOUS at 05:31

## 2021-01-28 RX ADMIN — Medication 50 MILLIGRAM(S): at 17:18

## 2021-01-28 RX ADMIN — ALBUTEROL 2 PUFF(S): 90 AEROSOL, METERED ORAL at 23:23

## 2021-01-28 RX ADMIN — Medication 50 MILLIGRAM(S): at 05:31

## 2021-01-28 RX ADMIN — AMLODIPINE BESYLATE 5 MILLIGRAM(S): 2.5 TABLET ORAL at 05:31

## 2021-01-28 RX ADMIN — OXYCODONE HYDROCHLORIDE 5 MILLIGRAM(S): 5 TABLET ORAL at 22:19

## 2021-01-28 RX ADMIN — Medication 2: at 11:59

## 2021-01-28 RX ADMIN — Medication 1 DROP(S): at 01:08

## 2021-01-28 RX ADMIN — Medication 300 MILLIGRAM(S): at 23:25

## 2021-01-28 RX ADMIN — Medication 1 PACKET(S): at 05:31

## 2021-01-28 RX ADMIN — Medication 15 MILLILITER(S): at 11:24

## 2021-01-28 RX ADMIN — Medication 1 TABLET(S): at 05:31

## 2021-01-28 RX ADMIN — ALBUTEROL 2 PUFF(S): 90 AEROSOL, METERED ORAL at 11:27

## 2021-01-28 RX ADMIN — Medication 1 TABLET(S): at 17:18

## 2021-01-28 RX ADMIN — Medication 1 APPLICATION(S): at 05:31

## 2021-01-28 RX ADMIN — CEFTAZIDIME 100 GRAM(S): 6 INJECTION, POWDER, FOR SOLUTION INTRAVENOUS at 22:19

## 2021-01-28 RX ADMIN — CHLORHEXIDINE GLUCONATE 1 APPLICATION(S): 213 SOLUTION TOPICAL at 11:24

## 2021-01-28 RX ADMIN — ALBUTEROL 2 PUFF(S): 90 AEROSOL, METERED ORAL at 14:39

## 2021-01-28 NOTE — PROGRESS NOTE ADULT - ATTENDING COMMENTS
Agree with plan as outlined above. Patient seen and examined at bedside. Patient history, laboratory data, and imaging personally reviewed.    Pt is a 60F with PMHX as above presenting to [facility] for hypoxemic respiratory failure with ARDS 2/2 COVID19 PNA further c/b superimposed MSSA cavitary PNA and ARF requiring HD. Pt with failure to wean from mechanical ventilation, s/p tracheostomy placement 12/18 and transfer to RCU 12/28.     Pt now with significantly improving encephalopathy, able to communicate effectively and answer questions/follow commands. Remains off all IV sedation since 12/24. CT Head (-) 12/12. EEG (-). No indication for further neurologic w/u at this time given clinical improvement. OOB to chair as tolerated. Moves all 4 extremities independently, but remains physically severely deconditioned and dependent of ADLs.     Pt with trach placement (CTSx, 12/18 Proximal 7XLT and then changed to 8Fr Bivona 12/31 for persistent air leak), now with resolution of air leak and significant improvement in ventilator synchrony. Daily SBTs as tolerated. Pt tolerating SBTs, will attempt trach collar this afternoon following CT. Will c/w airway clearance therapy and trach care as per RCU team. O2 saturations remain normal and pt did not exhibit new s/s respiratory distress, but continues to have worsening thick copious secretions and low-grade fevers. IPV added to airway clearance regimen. Will CTM carefully. Plan for CT Chest tonight.     Pt with hx MSSA bacteremia with cavitary PNA s/p completion of Abx with Primaxin on 12/28 followed by course of Ceftazidime through 1/5 for Stenotrophomonas PNA. Pt with recurrence of fevers and (+) sputum cx for Stenotrophomonas and Pseudomonas 1/12 s/p completion of Flagyl+Ceftazidime+Vancomycin with clinical improvement. 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 Currently asymptomatic and afebrile. Will monitor. Will f/u repeat CT Chest for evaluation of prior cavitary PNA today.     Pt with oropharyngeal dysphagia, IR guided PEG successfully placed 1/11. Pt tolerating PEG feeds. Pt also initially with ARF on CRRT, following by intermittent HD (last session 12/17) with renal recovery. NPH+HISS for glucose control. Wound consult and plastic sx recs appreciated.    Dispo pending medical optimization, likely [facility]. Agree with plan as outlined above. Patient seen and examined at bedside. Patient history, laboratory data, and imaging personally reviewed.    Pt is a 60F with PMHX as above presenting to [facility] for hypoxemic respiratory failure with ARDS 2/2 COVID19 PNA further c/b superimposed MSSA cavitary PNA and ARF requiring HD. Pt with failure to wean from mechanical ventilation, s/p tracheostomy placement 12/18 and transfer to RCU 12/28.     Pt now with significantly improving encephalopathy, able to communicate effectively and answer questions/follow commands. Remains off all IV sedation since 12/24. CT Head (-) 12/12. EEG (-). No indication for further neurologic w/u at this time given clinical improvement. OOB to chair as tolerated. Moves all 4 extremities independently, but remains physically severely deconditioned and dependent of ADLs.     Pt with trach placement (CTSx, 12/18 Proximal 7XLT and then changed to 8Fr Bivona 12/31 for persistent air leak), now with resolution of air leak and significant improvement in ventilator synchrony. Daily SBTs as tolerated. Pt tolerating SBTs, will attempt trach collar this afternoon following CT. Will c/w airway clearance therapy and trach care as per RCU team. O2 saturations remain normal and pt did not exhibit new s/s respiratory distress, but continues to have worsening thick copious secretions and low-grade fevers. IPV added to airway clearance regimen. Will CTM carefully. Plan for CT Chest tonight.     Pt with hx MSSA bacteremia with cavitary PNA s/p completion of Abx with Primaxin on 12/28 followed by course of Ceftazidime through 1/5 for Stenotrophomonas PNA. Pt with recurrence of fevers and (+) sputum cx for Stenotrophomonas and Pseudomonas 1/12 s/p completion of Flagyl+Ceftazidime+Vancomycin with clinical improvement. 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 Will discuss with ID benefit of eradication therapy with IV+Nebulized Abx given secretions and recurrent fevers. Will f/u repeat CT Chest for evaluation of prior cavitary PNA today.     Pt with oropharyngeal dysphagia, IR guided PEG successfully placed 1/11. Pt tolerating PEG feeds. Pt also initially with ARF on CRRT, following by intermittent HD (last session 12/17) with renal recovery. NPH+HISS for glucose control. Wound consult and plastic sx recs appreciated.    Dispo pending medical optimization, likely [facility]JEL[facility].

## 2021-01-28 NOTE — PROGRESS NOTE ADULT - ASSESSMENT
59 YO F with PMHx of HTN, DVT on Xarelto, Peritonitis s/p Oral's Procedure and Ileostomy (reversed in 2011) and AARON who presented to [facility] on 11/18 for AHRF second to COVID 19, intubated on 11/23 complicated by lung abscesses on CT CHEST 12/5, multi-bacterial PNA and bacteremia, ARTEMIO s/p CRRT and HD, and s/p Tracheostomy 12/18. Transferred to RCU for further management.     # AMS   - AOX4 at baseline.   - CTH and EEG WNL   - Now weaned off all sedation.   - Monitor mentation and supportive care   - Alert/follows commands     # ARDS second to COVID vs Multi-bacterial PNA/ R Lung Abscess  - Intubated 11/23 and course complicated with multi-bacterial PNA and lung abscesses.   - Lung abscess presented on CT CHEST 12/5  - s/p Tracheostomy on 12/18 by CTSx. Sutures out 1/1/2021  - Air leak noted and s/p Trach Exchange for Bivona with CTSx 12/31  - Continue on AC vent and PS as tolerated. Tolerated PS for >5 hr today 1/27  - Proventil, Chest PT and Suction PRN. Trach Care QD.     # Vasoplegic vs Septic Shock   - Weaned off pressors. Monitor BP on Norvasc and Metoprolol.   - On full AC for Hx of DVT. CTA CHEST with no PEs.     # ARTEMIO s/p CRRT and HD   - ARTEMIO now resolved and no longer required HD.   - Monitor renal function and avoid nephrotoxins.     # Dysphagia   - Initially CTSx and GI deferred PEG given Ventral Hernia   - s/p PEG placement with IR 1/11   - Nauseous episodes noted and s/p Reglan PRN. Continue on TF and monitor tolerance.   - No new sx /tolerating feeds     # Sepsis second to COVID vs Multi-bacterial PNA/ Bacteremia and R Lung Abscess   - COVID with superimposed multi-bacterial VAP vs aspiration PNA vs cavitary PNA.   - SCx (11/24) MSSA and BCx (11/27) with MSSA and Proteus Bacteremia  - SCx (12/1, 12/12 and 12/27) with Stenotrophomonas.   - SCx (1/9) with  Pseudomonas and Stenotrophomonas   - BCX has been persistently negative from 12/1, and most recently 1/12   - CT CHEST 12/4 with right lung cavitation.   - s/p multiple antibiotics, but now on Fortaz and Flagyl (1/12-1/16)   - Monitor off antibiotics.   - No longer febrile and WBC downtrending. Blood cx NTD. Sputum growing gram neg but known hx of pseudo  tracheitis.  - CT chest ordered as has significant sputum production with cavitation on previous ct    # DM2   - Continue on ISS and NPH and monitor FS.   - Adjust insulin as needed.     # Hx of DVT   - Continue on Lovenox FULL DOSE for Xarelto for now.     # Skin/ Facial Wounds   - WOC recommendations appreciated   - Plastics evaluation and recommendations of facial wound appreciated     # DVT/ GI PPX with FULL AC/ PPI   # CODE STATUS - FULL CODE   # DISPO - Rehab likely Vent facility.    61 YO F with PMHx of HTN, DVT on Xarelto, Peritonitis s/p Oral's Procedure and Ileostomy (reversed in 2011) and AARON who presented to [facility] on 11/18 for AHRF second to COVID 19, intubated on 11/23 complicated by lung abscesses on CT CHEST 12/5, multi-bacterial PNA and bacteremia, ARTEMIO s/p CRRT and HD, and s/p Tracheostomy 12/18. Transferred to RCU for further management.     # AMS   - AOX4 at baseline.   - CTH and EEG WNL   - Now weaned off all sedation.   - Monitor mentation and supportive care   - Alert/follows commands     # ARDS second to COVID vs Multi-bacterial PNA/ R Lung Abscess  - Intubated 11/23 and course complicated with multi-bacterial PNA and lung abscesses.   - Lung abscess presented on CT CHEST 12/5  - s/p Tracheostomy on 12/18 by CTSx. Sutures out 1/1/2021  - Air leak noted and s/p Trach Exchange for Bivona with CTSx 12/31  - Continue on AC vent and PS as tolerated. Tolerated PS for >5 hr today 1/27  - Proventil, Chest PT and Suction PRN. Trach Care QD.     # Vasoplegic vs Septic Shock   - Weaned off pressors. Monitor BP on Norvasc and Metoprolol.   - On full AC for Hx of DVT. CTA CHEST with no PEs.     # ARTEMIO s/p CRRT and HD   - ARTEMIO now resolved and no longer required HD.   - Monitor renal function and avoid nephrotoxins.     # Dysphagia   - Initially CTSx and GI deferred PEG given Ventral Hernia   - s/p PEG placement with IR 1/11   - Nauseous episodes noted and s/p Reglan PRN. Continue on TF and monitor tolerance.   - No new sx /tolerating feeds     # Sepsis second to COVID vs Multi-bacterial PNA/ Bacteremia and R Lung Abscess   - COVID with superimposed multi-bacterial VAP vs aspiration PNA vs cavitary PNA.   - SCx (11/24) MSSA and BCx (11/27) with MSSA and Proteus Bacteremia  - SCx (12/1, 12/12 and 12/27) with Stenotrophomonas.   - SCx (1/9) with  Pseudomonas and Stenotrophomonas   - BCX has been persistently negative from 12/1, and most recently 1/12   - CT CHEST 12/4 with right lung cavitation.   - s/p multiple antibiotics, but now on Fortaz and Flagyl (1/12-1/16)   - Monitor off antibiotics.   - No longer febrile and WBC downtrending. Blood cx NTD. Sputum growing gram neg but known hx of pseudo  tracheitis.  - CT chest ordered as has significant sputum production with cavitation on previous ct Abcesses improved   - Seen by ID Fortaz/inhaled tobra started     # DM2   - Continue on ISS and NPH and monitor FS.   - Adjust insulin as needed.     # Hx of DVT   - Continue on Lovenox FULL DOSE for Xarelto for now.     # Skin/ Facial Wounds   - WOC recommendations appreciated   - Plastics evaluation and recommendations of facial wound appreciated     # DVT/ GI PPX with FULL AC/ PPI   # CODE STATUS - FULL CODE   # DISPO - Rehab likely Vent facility.

## 2021-01-28 NOTE — PROGRESS NOTE ADULT - SUBJECTIVE AND OBJECTIVE BOX
Follow Up:      Inverval History/ROS:Patient is a 60y old  Female who presents with a chief complaint of Transfer from [facility] (22 Dec 2020 08:13)    increased respiratory secretions.   sputum has CRE pseudomonas and Stenotrophomonas.  CT shows improved lung abscesses.         Allergies    Kiwi (Unknown)  latex (Anaphylaxis)  latex (Unknown)  penicillin (Other)  penicillin (Unknown)  perfume  hives (Other)  potassium acetate (Other)  soap additives hives (Other)    Intolerances        ANTIMICROBIALS: cefTAZidime  IVPB 2 every 8 hours  tobramycin for Nebulization 300 every 12 hours        MEDICATIONS  (STANDING):  acetaminophen    Suspension .. 650 every 6 hours PRN  amLODIPine   Tablet 5 daily  enoxaparin Injectable 100 every 12 hours  insulin lispro (ADMELOG) corrective regimen sliding scale  every 6 hours  metoprolol tartrate 50 two times a day  metoprolol tartrate Injectable 5 every 6 hours  pantoprazole  Injectable 40 daily  psyllium Powder 1 two times a day    Vital Signs Last 24 Hrs  T(F): 97.6 (01-28-21 @ 11:25), Max: 98.9 (01-28-21 @ 05:30)  HR: 100 (01-28-21 @ 14:39)  BP: 156/77 (01-28-21 @ 11:25)  RR: 24 (01-28-21 @ 11:25)  SpO2: 98% (01-28-21 @ 14:39) (96% - 100%)    PHYSICAL EXAM:  General: [x ] trach, uncomfortable,  communicative  ENT:  eschar chin  Cardiovascular:   distant  Respiratory:  coarse   GI:  [x ] soft, scars, hernia, gastrostomy tube   :  [ ] negro   Musculoskeletal: wiggles toes, moves arms  Neurologic: moves all extremities  Skin:   no rash  Psychiatric: alert, awake  Lines: arrow both arms                          8.3    11.45 )-----------( 351      ( 27 Jan 2021 07:09 )             28.4 01-27    135  |  95  |  9   ----------------------------<  124  3.5   |  30  |  0.25  Ca    8.3      27 Jan 2021 07:09Phos  4.1     01-27Mg     1.5     01-27        MICROBIOLOGY:  1/25 blood culture x 2 no growth  1/25 sputum   culCulture - Sputum . (01.25.21 @ 20:52)   - Amikacin: S <=16   - Aztreonam: R >16   - Cefepime: S 8   - Ceftazidime: S 8   - Ciprofloxacin: S 0.5   - Gentamicin: S <=2   - Imipenem: R >8   - Levofloxacin: S 2   - Meropenem: R >8   - Piperacillin/Tazobactam: I 32   - Tobramycin: S 4   Gram Stain:   Few polymorphonuclear leukocytes per low power field   Rare Squamous epithelial cells per low power field   Numerous Gram Negative Rods per oil power field   Specimen Source: .Sputum Sputum   Culture Results:   Numerous Pseudomonas aeruginosa (Carbapenem Resistant)   Numerous Stenotrophomonas maltophilia   Normal Respiratory Jocelynn absent   Organism Identification: Pseudomonas aeruginosa (Carbapenem Resistant)   Organism: Pseudomonas aeruginosa (Carbapenem Resistant)   Method Type: LONG   ulCOVID-19 PCR . (01.24.21 @ 15:01)   COVID-19 PCR: NotDetec: Culture - Blood (01.12.21 @ 18:55)   Specimen Source: .Blood Blood-Venous   Culture Results:   No Growth Final Culture - Blood (01.12.21 @ 18:50)   Specimen Source: .Blood Blood-Peripheral   Culture Results:   No Growth Final     1/12 blood cultures x 2 no growth to date    cuCulture - Sputum . (01.09.21 @ 18:30)   - Amikacin: S <=16   - Aztreonam: I 16   - Cefepime: S 8   - Ceftazidime: S 4   - Ceftazidime: S 4   - Ciprofloxacin: S 0.5   - Gentamicin: S <=2   - Imipenem: R >8   - Levofloxacin: S 2   - Levofloxacin: S <=0.5   - Meropenem: R >8   - Piperacillin/Tazobactam: S 16   - Tobramycin: S <=2   - Trimethoprim/Sulfamethoxazole: S <=0.5/9.5   Gram Stain:   Few polymorphonuclear leukocytes per low power field   No Squamous epithelial cells per low power field   Numerous Gram Negative Rods seen per oil power field   Specimen Source: .Sputum Sputum   Culture Results:   Numerous Pseudomonas aeruginosa (Carbapenem Resistant)   Numerous Stenotrophomonas maltophilia   Normal Respiratory Jocelynn absent   Organism Identification: Pseudomonas aeruginosa (Carbapenem Resistant)   Stenotrophomonas maltophilia     RADIOLOGY:    none recnet

## 2021-01-28 NOTE — CHART NOTE - NSCHARTNOTEFT_GEN_A_CORE
Pt is 59 YO F with PMHx of HTN, DVT on Xarelto, Peritonitis s/p Oral's Procedure and Ileostomy (reversed in 2011) and AARON who presented to [facility] on 11/18 for AHRF second to COVID 19, intubated on 11/23 complicated by lung abscesses on CT CHEST 12/5, multi-bacterial PNA and bacteremia, ARTEMIO s/p CRRT and HD, and s/p Tracheostomy 12/18. Transferred to [facility] for further management.     Pt continues on tube feeds of Glucerna 1.5 via PEG at current goal rate of 35mL/hr. Current TF order providing 1260 Kcals and total pro 114 gm, 11 kcal/kg and 2 gm pro./IBW. Receiving 1600 ml free water daily. No tube feeding intolerances reported. Weight trending upward, currently at 115 kg. Current TF meeting estimated needs.     DIET :NPO with Tube Feed:   Tube Feeding Modality: Gastrostomy  Glucerna 1.5 Pelon (GLUCERNA1.5RTH)  Total Volume for 24 Hours (mL): 840  Continuous  Starting Tube Feed Rate {mL per Hour}: 10  Increase Tube Feed Rate by (mL): 10     Every 4 hours  Until Goal Tube Feed Rate (mL per Hour): 35  Tube Feed Duration (in Hours): 24  Tube Feed Start Time: 18:00  No Carb Prosource (1pkg = 15gms Protein)     Qty per Day:  3 (01-17-21 @ 17:52)    WEIGHT: (1/24) 115 kg (1/21) 112 (1/20) 113     PERTINENT MEDICATIONS: MEDICATIONS  (STANDING):,   ALBUTerol    90 MICROgram(s) HFA Inhaler 2 Puff(s) Inhalation every 6 hours  amLODIPine   Tablet 5 milliGRAM(s) Oral daily  ascorbic acid 500 milliGRAM(s) Oral daily  chlorhexidine 4% Liquid 1 Application(s) Topical daily  collagenase Ointment 1 Application(s) Topical two times a day  enoxaparin Injectable 100 milliGRAM(s) SubCutaneous every 12 hours  insulin lispro (ADMELOG) corrective regimen sliding scale   SubCutaneous every 6 hours  lactobacillus acidophilus 1 Tablet(s) Oral two times a day  metoprolol tartrate 50 milliGRAM(s) Oral two times a day  multivitamin/minerals/iron Oral Solution (CENTRUM) 15 milliLiter(s) Oral daily  pantoprazole   Suspension 40 milliGRAM(s) Oral daily  psyllium Powder 1 Packet(s) Oral two times a day  zinc sulfate 220 milliGRAM(s) Oral daily    LABS:  01-27 Na135 mmol/L Glu 124 mg/dL<H> K+ 3.5 mmol/L Cr  0.25 mg/dL<L> BUN 9 mg/dL 01-27 Phos 4.1 mg/dL    SKIN: Chin unstageable, Sacrum Unstageable    EDEMA: none    Nutrient Needs:  6673-8513 kcals (11-14 gm/ABW)  104-114 gm (2.0-2.2 gm)                                     ~~~~~RECOMMEND~~~~~  1.  Continue current EN as ordered.  2.  Monitor TF tolerance, skin integrity and pertinent labs.    3.  Please obtain current weight.  4.  Continue MVI supplement.    JAYASHREE Vizcaino RD, CDN, CDE Pt is 61 YO F with PMHx of HTN, DVT on Xarelto, Peritonitis s/p Oral's Procedure and Ileostomy (reversed in 2011) and AARON who presented to [facility] on 11/18 for AHRF second to COVID 19, intubated on 11/23 complicated by lung abscesses on CT CHEST 12/5, multi-bacterial PNA and bacteremia, ARTEMIO s/p CRRT and HD, and s/p Tracheostomy 12/18. Transferred to RCU for further management.     Pt denies nausea/vomiting, continues with diarrhea. Spoke with Nursing reports BM's are ongoing, but less watery. Continues on Lactobacillus  and Psyllium Powder. Pt continues on tube feeds of Glucerna 1.5 via PEG at current goal rate of 35mL/hr. Current TF order providing 1260 Kcals and total pro 114 gm, 11 kcal/kg and 2 gm pro./IBW.  Receiving 1600 ml free water daily.  Weight trending upward, currently at 115 kg. May recommend to trial with TF Peptamen 1.5 considering lose bowel movements.     DIET :NPO with Tube Feed:   Tube Feeding Modality: Gastrostomy  Glucerna 1.5 Pelon (GLUCERNA1.5RTH)  Total Volume for 24 Hours (mL): 840  Continuous  Starting Tube Feed Rate {mL per Hour}: 10  Increase Tube Feed Rate by (mL): 10     Every 4 hours  Until Goal Tube Feed Rate (mL per Hour): 35  Tube Feed Duration (in Hours): 24  Tube Feed Start Time: 18:00  No Carb Prosource (1pkg = 15gms Protein)     Qty per Day:  3 (01-17-21 @ 17:52)    WEIGHT: (1/24) 115 kg (1/21) 112 (1/20) 113     PERTINENT MEDICATIONS: MEDICATIONS  (STANDING):,   ALBUTerol    90 MICROgram(s) HFA Inhaler 2 Puff(s) Inhalation every 6 hours  amLODIPine   Tablet 5 milliGRAM(s) Oral daily  ascorbic acid 500 milliGRAM(s) Oral daily  chlorhexidine 4% Liquid 1 Application(s) Topical daily  collagenase Ointment 1 Application(s) Topical two times a day  enoxaparin Injectable 100 milliGRAM(s) SubCutaneous every 12 hours  insulin lispro (ADMELOG) corrective regimen sliding scale   SubCutaneous every 6 hours  lactobacillus acidophilus 1 Tablet(s) Oral two times a day  metoprolol tartrate 50 milliGRAM(s) Oral two times a day  multivitamin/minerals/iron Oral Solution (CENTRUM) 15 milliLiter(s) Oral daily  pantoprazole   Suspension 40 milliGRAM(s) Oral daily  psyllium Powder 1 Packet(s) Oral two times a day  zinc sulfate 220 milliGRAM(s) Oral daily    LABS:  01-27 Na135 mmol/L Glu 124 mg/dL<H> K+ 3.5 mmol/L Cr  0.25 mg/dL<L> BUN 9 mg/dL 01-27 Phos 4.1 mg/dL    SKIN: Chin unstageable, Sacrum Unstageable    EDEMA: none    Nutrient Needs:  2509-5634 kcals (11-14 gm/ABW)  104-114 gm (2.0-2.2 gm)                                     ~~~~~RECOMMEND~~~~~  1.  Recommend Peptamen 1.5 @ 25 ml/hr, increase as tolerated to goal rate of 42 ml/hr x 24 hrs.  Continue with Prosource 1 pack 3x daily. This regimen will provide 1512 kcals, total 113 gm pro.   2.  Monitor TF tolerance, skin integrity and pertinent labs.    3.  Please obtain current weight.  4.  Continue MVI supplement.    JAYASHREE Vizcaino RD, CDN, CDE

## 2021-01-28 NOTE — PROGRESS NOTE ADULT - ASSESSMENT
61 yo woman with AARON, h/o Lockett's procedure, s/p reversal 2011 who was admitted to [facility] 11/17 with covid pneumonia.  She received course of remdesevir and dexamethasone; required intubation 11/23 for acute hypoxemic respiratory failure. Hospital course complicated by Proteus bacteremia, MSSA bacteremia/ pneumonia, and Stenotrophomonas respiratory infection, as well as ATN requiring CVVHD --> HD ---> off dialysis.  CT chest 12/4 new large consolidation with cavitation in right lung.  Blood culture 11/27 MSSA and proteus; 11/28 MSSA.     Blood cultures 12/1, 12/11, 12/21, 12/27 no growth.  Sputum culture 12/1, 12/12, 12/27, 12/29, 1/9 Stenotrophomonas.    Multifocal covid pneumonia with respiratory failure, MSSA bacteremia 11/27 with cavitary pneumonia, Proteus bacteremia 11/27, Stenotrophomonas pneumonia.  s/p prolonged antibiotics for bacteremia, pneumonia, aspiration.  s/p trach 12/18.  12/29 Fever, blood tinged sputum and Stenotrophomonas in sputum.  meropenem changed to ceftazidime  --> 1/5 12/31 trach leak.  s/p OR 12/31 for trach change with CT surgery.     1/11 IR gastrostomy placement   1/12 fever pseudomonas and Stenotrophomonas in sputum. received course of ceftazidime, flagyl, vanco.    1/25 fever 102 increased pulmonary secretions with CRE pseudomonas and steno in sputum    1/28 CT chest decrease size lung abscesses      Suggest:   agree with plan for ceftazidime and inhaled tobra  may need longer course of tx  contact precautions for CRE     Mila Burgos MD  Pager: [phone]  After 5 PM or weekends please call fellow on call or office [phone]

## 2021-01-28 NOTE — PROGRESS NOTE ADULT - SUBJECTIVE AND OBJECTIVE BOX
CHIEF COMPLAINT: Patient is a 60y old  Female who presents with a chief complaint of Transfer from [facility] (27 Jan 2021 08:36)      Interval Events: Pt seen and evaluated be team. Followed by ID    REVIEW OF SYSTEMS:  Constitutional:   Eyes:  ENT:  CV:  Resp:  GI:  :  MSK:  Integumentary:  Neurological:  Psychiatric:  Endocrine:  Hematologic/Lymphatic:  Allergic/Immunologic:  [x ] All other systems negative  [ ] Unable to assess ROS because ________    OBJECTIVE:  ICU Vital Signs Last 24 Hrs  T(C): 37.2 (28 Jan 2021 05:30), Max: 37.2 (28 Jan 2021 05:30)  T(F): 98.9 (28 Jan 2021 05:30), Max: 98.9 (28 Jan 2021 05:30)  HR: 109 (28 Jan 2021 07:33) (80 - 118)  BP: 132/72 (28 Jan 2021 05:30) (130/64 - 144/60)  BP(mean): --  ABP: --  ABP(mean): --  RR: 25 (28 Jan 2021 05:30) (20 - 25)  SpO2: 96% (28 Jan 2021 07:33) (96% - 100%)    Mode: CPAP with PS, FiO2: 40, PEEP: 5, PS: 10, MAP: 10    01-27 @ 07:01  -  01-28 @ 07:00  --------------------------------------------------------  IN: 1220 mL / OUT: 800 mL / NET: 420 mL      CAPILLARY BLOOD GLUCOSE      POCT Blood Glucose.: 135 mg/dL (28 Jan 2021 05:03)      PHYSICAL EXAM:  General:   HEENT:   Lymph Nodes:  Neck:   Respiratory:   Cardiovascular:   Abdomen:   Extremities:   Skin:   Neurological:  Psychiatry:    HOSPITAL MEDICATIONS:  MEDICATIONS  (STANDING):  ALBUTerol    90 MICROgram(s) HFA Inhaler 2 Puff(s) Inhalation every 6 hours  amLODIPine   Tablet 5 milliGRAM(s) Oral daily  ascorbic acid 500 milliGRAM(s) Oral daily  chlorhexidine 4% Liquid 1 Application(s) Topical daily  collagenase Ointment 1 Application(s) Topical two times a day  enoxaparin Injectable 100 milliGRAM(s) SubCutaneous every 12 hours  insulin lispro (ADMELOG) corrective regimen sliding scale   SubCutaneous every 6 hours  lactobacillus acidophilus 1 Tablet(s) Oral two times a day  metoprolol tartrate 50 milliGRAM(s) Oral two times a day  multivitamin/minerals/iron Oral Solution (CENTRUM) 15 milliLiter(s) Oral daily  pantoprazole   Suspension 40 milliGRAM(s) Oral daily  psyllium Powder 1 Packet(s) Oral two times a day  zinc sulfate 220 milliGRAM(s) Oral daily    MEDICATIONS  (PRN):  acetaminophen    Suspension .. 650 milliGRAM(s) Oral every 6 hours PRN Temp greater or equal to 38C (100.4F), Mild Pain (1 - 3), Moderate Pain (4 - 6)  artificial tears (preservative free) Ophthalmic Solution 1 Drop(s) Both EYES every 8 hours PRN Dry Eyes  oxyCODONE    Solution 5 milliGRAM(s) Oral every 4 hours PRN Severe Pain (7 - 10)      LABS:                        8.3    11.45 )-----------( 351      ( 27 Jan 2021 07:09 )             28.4     01-27    135  |  95<L>  |  9   ----------------------------<  124<H>  3.5   |  30  |  0.25<L>    Ca    8.3<L>      27 Jan 2021 07:09  Phos  4.1     01-27  Mg     1.5     01-27                MICROBIOLOGY:     RADIOLOGY:  [ ] Reviewed and interpreted by me    PULMONARY FUNCTION TESTS:    EKG: CHIEF COMPLAINT: Patient is a 60y old  Female who presents with a chief complaint of Transfer from [facility] (27 Jan 2021 08:36)      Interval Events: Pt seen and evaluated be team. Followed by ID    REVIEW OF SYSTEMS:  Constitutional: No pain/chills   CV: Denies   Resp: + cough /sputum  GI: Denies   MSK: Weak:  [x ] All other systems negative      OBJECTIVE:  ICU Vital Signs Last 24 Hrs  T(C): 37.2 (28 Jan 2021 05:30), Max: 37.2 (28 Jan 2021 05:30)  T(F): 98.9 (28 Jan 2021 05:30), Max: 98.9 (28 Jan 2021 05:30)  HR: 109 (28 Jan 2021 07:33) (80 - 118)  BP: 132/72 (28 Jan 2021 05:30) (130/64 - 144/60)  BP(mean): --  ABP: --  ABP(mean): --  RR: 25 (28 Jan 2021 05:30) (20 - 25)  SpO2: 96% (28 Jan 2021 07:33) (96% - 100%)    Mode: CPAP with PS, FiO2: 40, PEEP: 5, PS: 10, MAP: 10    01-27 @ 07:01  -  01-28 @ 07:00  --------------------------------------------------------  IN: 1220 mL / OUT: 800 mL / NET: 420 mL      CAPILLARY BLOOD GLUCOSE      POCT Blood Glucose.: 135 mg/dL (28 Jan 2021 05:03)          HOSPITAL MEDICATIONS:  MEDICATIONS  (STANDING):  ALBUTerol    90 MICROgram(s) HFA Inhaler 2 Puff(s) Inhalation every 6 hours  amLODIPine   Tablet 5 milliGRAM(s) Oral daily  ascorbic acid 500 milliGRAM(s) Oral daily  chlorhexidine 4% Liquid 1 Application(s) Topical daily  collagenase Ointment 1 Application(s) Topical two times a day  enoxaparin Injectable 100 milliGRAM(s) SubCutaneous every 12 hours  insulin lispro (ADMELOG) corrective regimen sliding scale   SubCutaneous every 6 hours  lactobacillus acidophilus 1 Tablet(s) Oral two times a day  metoprolol tartrate 50 milliGRAM(s) Oral two times a day  multivitamin/minerals/iron Oral Solution (CENTRUM) 15 milliLiter(s) Oral daily  pantoprazole   Suspension 40 milliGRAM(s) Oral daily  psyllium Powder 1 Packet(s) Oral two times a day  zinc sulfate 220 milliGRAM(s) Oral daily    MEDICATIONS  (PRN):  acetaminophen    Suspension .. 650 milliGRAM(s) Oral every 6 hours PRN Temp greater or equal to 38C (100.4F), Mild Pain (1 - 3), Moderate Pain (4 - 6)  artificial tears (preservative free) Ophthalmic Solution 1 Drop(s) Both EYES every 8 hours PRN Dry Eyes  oxyCODONE    Solution 5 milliGRAM(s) Oral every 4 hours PRN Severe Pain (7 - 10)      LABS:                        8.3    11.45 )-----------( 351      ( 27 Jan 2021 07:09 )             28.4     01-27    135  |  95<L>  |  9   ----------------------------<  124<H>  3.5   |  30  |  0.25<L>    Ca    8.3<L>      27 Jan 2021 07:09  Phos  4.1     01-27  Mg     1.5     01-27                MICROBIOLOGY:     RADIOLOGY:  [ ] Reviewed and interpreted by me    PULMONARY FUNCTION TESTS:    EKG:

## 2021-01-29 LAB
ANION GAP SERPL CALC-SCNC: 11 MMOL/L — SIGNIFICANT CHANGE UP (ref 7–14)
BASOPHILS # BLD AUTO: 0.07 K/UL — SIGNIFICANT CHANGE UP (ref 0–0.2)
BASOPHILS NFR BLD AUTO: 0.6 % — SIGNIFICANT CHANGE UP (ref 0–2)
BUN SERPL-MCNC: 12 MG/DL — SIGNIFICANT CHANGE UP (ref 7–23)
CALCIUM SERPL-MCNC: 8.8 MG/DL — SIGNIFICANT CHANGE UP (ref 8.4–10.5)
CHLORIDE SERPL-SCNC: 98 MMOL/L — SIGNIFICANT CHANGE UP (ref 98–107)
CO2 SERPL-SCNC: 26 MMOL/L — SIGNIFICANT CHANGE UP (ref 22–31)
CREAT SERPL-MCNC: 0.23 MG/DL — LOW (ref 0.5–1.3)
EOSINOPHIL # BLD AUTO: 0.61 K/UL — HIGH (ref 0–0.5)
EOSINOPHIL NFR BLD AUTO: 5.5 % — SIGNIFICANT CHANGE UP (ref 0–6)
GLUCOSE BLDC GLUCOMTR-MCNC: 130 MG/DL — HIGH (ref 70–99)
GLUCOSE BLDC GLUCOMTR-MCNC: 147 MG/DL — HIGH (ref 70–99)
GLUCOSE BLDC GLUCOMTR-MCNC: 166 MG/DL — HIGH (ref 70–99)
GLUCOSE SERPL-MCNC: 111 MG/DL — HIGH (ref 70–99)
HCT VFR BLD CALC: 27.5 % — LOW (ref 34.5–45)
HGB BLD-MCNC: 7.8 G/DL — LOW (ref 11.5–15.5)
IANC: 6.9 K/UL — SIGNIFICANT CHANGE UP (ref 1.5–8.5)
IMM GRANULOCYTES NFR BLD AUTO: 2.7 % — HIGH (ref 0–1.5)
LYMPHOCYTES # BLD AUTO: 2.48 K/UL — SIGNIFICANT CHANGE UP (ref 1–3.3)
LYMPHOCYTES # BLD AUTO: 22.2 % — SIGNIFICANT CHANGE UP (ref 13–44)
MAGNESIUM SERPL-MCNC: 1.5 MG/DL — LOW (ref 1.6–2.6)
MCHC RBC-ENTMCNC: 27.7 PG — SIGNIFICANT CHANGE UP (ref 27–34)
MCHC RBC-ENTMCNC: 28.4 GM/DL — LOW (ref 32–36)
MCV RBC AUTO: 97.5 FL — SIGNIFICANT CHANGE UP (ref 80–100)
MONOCYTES # BLD AUTO: 0.79 K/UL — SIGNIFICANT CHANGE UP (ref 0–0.9)
MONOCYTES NFR BLD AUTO: 7.1 % — SIGNIFICANT CHANGE UP (ref 2–14)
NEUTROPHILS # BLD AUTO: 6.9 K/UL — SIGNIFICANT CHANGE UP (ref 1.8–7.4)
NEUTROPHILS NFR BLD AUTO: 61.9 % — SIGNIFICANT CHANGE UP (ref 43–77)
NRBC # BLD: 0 /100 WBCS — SIGNIFICANT CHANGE UP
NRBC # FLD: 0.02 K/UL — HIGH
PHOSPHATE SERPL-MCNC: 3.1 MG/DL — SIGNIFICANT CHANGE UP (ref 2.5–4.5)
PLATELET # BLD AUTO: 412 K/UL — HIGH (ref 150–400)
POTASSIUM SERPL-MCNC: 3.8 MMOL/L — SIGNIFICANT CHANGE UP (ref 3.5–5.3)
POTASSIUM SERPL-SCNC: 3.8 MMOL/L — SIGNIFICANT CHANGE UP (ref 3.5–5.3)
RBC # BLD: 2.82 M/UL — LOW (ref 3.8–5.2)
RBC # FLD: 17 % — HIGH (ref 10.3–14.5)
SODIUM SERPL-SCNC: 135 MMOL/L — SIGNIFICANT CHANGE UP (ref 135–145)
WBC # BLD: 11.15 K/UL — HIGH (ref 3.8–10.5)
WBC # FLD AUTO: 11.15 K/UL — HIGH (ref 3.8–10.5)

## 2021-01-29 PROCEDURE — 99233 SBSQ HOSP IP/OBS HIGH 50: CPT

## 2021-01-29 RX ORDER — MAGNESIUM SULFATE 500 MG/ML
2 VIAL (ML) INJECTION ONCE
Refills: 0 | Status: COMPLETED | OUTPATIENT
Start: 2021-01-29 | End: 2021-01-29

## 2021-01-29 RX ADMIN — Medication 2: at 12:26

## 2021-01-29 RX ADMIN — ALBUTEROL 2 PUFF(S): 90 AEROSOL, METERED ORAL at 04:21

## 2021-01-29 RX ADMIN — CHLORHEXIDINE GLUCONATE 1 APPLICATION(S): 213 SOLUTION TOPICAL at 12:24

## 2021-01-29 RX ADMIN — Medication 1 PACKET(S): at 05:14

## 2021-01-29 RX ADMIN — ALBUTEROL 2 PUFF(S): 90 AEROSOL, METERED ORAL at 22:01

## 2021-01-29 RX ADMIN — PANTOPRAZOLE SODIUM 40 MILLIGRAM(S): 20 TABLET, DELAYED RELEASE ORAL at 12:24

## 2021-01-29 RX ADMIN — ENOXAPARIN SODIUM 100 MILLIGRAM(S): 100 INJECTION SUBCUTANEOUS at 17:34

## 2021-01-29 RX ADMIN — Medication 50 GRAM(S): at 10:06

## 2021-01-29 RX ADMIN — Medication 300 MILLIGRAM(S): at 21:59

## 2021-01-29 RX ADMIN — ALBUTEROL 2 PUFF(S): 90 AEROSOL, METERED ORAL at 11:19

## 2021-01-29 RX ADMIN — OXYCODONE HYDROCHLORIDE 5 MILLIGRAM(S): 5 TABLET ORAL at 10:06

## 2021-01-29 RX ADMIN — ZINC SULFATE TAB 220 MG (50 MG ZINC EQUIVALENT) 220 MILLIGRAM(S): 220 (50 ZN) TAB at 12:23

## 2021-01-29 RX ADMIN — Medication 50 MILLIGRAM(S): at 17:34

## 2021-01-29 RX ADMIN — CEFTAZIDIME 100 GRAM(S): 6 INJECTION, POWDER, FOR SOLUTION INTRAVENOUS at 16:14

## 2021-01-29 RX ADMIN — CEFTAZIDIME 100 GRAM(S): 6 INJECTION, POWDER, FOR SOLUTION INTRAVENOUS at 05:14

## 2021-01-29 RX ADMIN — Medication 1 APPLICATION(S): at 05:14

## 2021-01-29 RX ADMIN — ALBUTEROL 2 PUFF(S): 90 AEROSOL, METERED ORAL at 16:24

## 2021-01-29 RX ADMIN — Medication 1 TABLET(S): at 17:34

## 2021-01-29 RX ADMIN — Medication 1 APPLICATION(S): at 17:35

## 2021-01-29 RX ADMIN — OXYCODONE HYDROCHLORIDE 5 MILLIGRAM(S): 5 TABLET ORAL at 05:37

## 2021-01-29 RX ADMIN — Medication 500 MILLIGRAM(S): at 12:25

## 2021-01-29 RX ADMIN — CEFTAZIDIME 100 GRAM(S): 6 INJECTION, POWDER, FOR SOLUTION INTRAVENOUS at 21:52

## 2021-01-29 RX ADMIN — Medication 15 MILLILITER(S): at 12:24

## 2021-01-29 RX ADMIN — Medication 1 TABLET(S): at 05:14

## 2021-01-29 RX ADMIN — AMLODIPINE BESYLATE 5 MILLIGRAM(S): 2.5 TABLET ORAL at 05:14

## 2021-01-29 RX ADMIN — Medication 50 MILLIGRAM(S): at 05:14

## 2021-01-29 RX ADMIN — ENOXAPARIN SODIUM 100 MILLIGRAM(S): 100 INJECTION SUBCUTANEOUS at 05:14

## 2021-01-29 RX ADMIN — Medication 1 PACKET(S): at 17:36

## 2021-01-29 NOTE — PROGRESS NOTE ADULT - ASSESSMENT
61 YO F with PMHx of HTN, DVT on Xarelto, Peritonitis s/p Oral's Procedure and Ileostomy (reversed in 2011) and AARON who presented to [facility] on 11/18 for AHRF second to COVID 19, intubated on 11/23 complicated by lung abscesses on CT CHEST 12/5, multi-bacterial PNA and bacteremia, ARTEMIO s/p CRRT and HD, and s/p Tracheostomy 12/18. Transferred to [facility] for further management.     # AMS   - AOX4 at baseline.   - CTH and EEG WNL   - Now weaned off all sedation.   - Monitor mentation and supportive care   - Alert/follows commands     # ARDS second to COVID vs Multi-bacterial PNA/ R Lung Abscess  - Intubated 11/23 and course complicated with multi-bacterial PNA and lung abscesses.   - Lung abscess presented on CT CHEST 12/5  - s/p Tracheostomy on 12/18 by CTSx. Sutures out 1/1/2021  - Air leak noted and s/p Trach Exchange for Bivona with CTSx 12/31  - Continue on AC vent and PS as tolerated. Tolerated PS for >5 hr today 1/27  - Proventil, Chest PT and Suction PRN. Trach Care QD.     # Vasoplegic vs Septic Shock   - Weaned off pressors. Monitor BP on Norvasc and Metoprolol.   - On full AC for Hx of DVT. CTA CHEST with no PEs.     # ARTEMIO s/p CRRT and HD   - ARTEMIO now resolved and no longer required HD.   - Monitor renal function and avoid nephrotoxins.     # Dysphagia   - Initially CTSx and GI deferred PEG given Ventral Hernia   - s/p PEG placement with IR 1/11   - Nauseous episodes noted and s/p Reglan PRN. Continue on TF and monitor tolerance.   - No new sx /tolerating feeds     # Sepsis second to COVID vs Multi-bacterial PNA/ Bacteremia and R Lung Abscess   - COVID with superimposed multi-bacterial VAP vs aspiration PNA vs cavitary PNA.   - SCx (11/24) MSSA and BCx (11/27) with MSSA and Proteus Bacteremia  - SCx (12/1, 12/12 and 12/27) with Stenotrophomonas.   - SCx (1/9) with  Pseudomonas and Stenotrophomonas   - BCX has been persistently negative from 12/1, and most recently 1/12   - CT CHEST 12/4 with right lung cavitation.   - s/p multiple antibiotics, but now on Fortaz and Flagyl (1/12-1/16)   - Monitor off antibiotics.   - No longer febrile and WBC downtrending. Blood cx NTD. Sputum growing gram neg but known hx of pseudo  tracheitis.  - CT chest ordered as has significant sputum production with cavitation on previous ct Abcesses improved   - Seen by ID Fortaz/inhaled tobra started     # DM2   - Continue on ISS and NPH and monitor FS.   - Adjust insulin as needed.     # Hx of DVT   - Continue on Lovenox FULL DOSE for Xarelto for now.     # Skin/ Facial Wounds   - WOC recommendations appreciated   - Plastics evaluation and recommendations of facial wound appreciated     # DVT/ GI PPX with FULL AC/ PPI   # CODE STATUS - FULL CODE   # DISPO - Rehab likely Vent facility.    61 YO F with PMHx of HTN, DVT on Xarelto, Peritonitis s/p Oral's Procedure and Ileostomy (reversed in 2011) and AARON who presented to [facility] on 11/18 for AHRF second to COVID 19, intubated on 11/23 complicated by lung abscesses on CT CHEST 12/5, multi-bacterial PNA and bacteremia, ARTEMIO s/p CRRT and HD, and s/p Tracheostomy 12/18. Transferred to [facility] for further management.     # AMS   - AOX4 at baseline.   - CTH and EEG WNL   - Now weaned off all sedation.   - Monitor mentation and supportive care   - Alert/follows commands     # ARDS second to COVID vs Multi-bacterial PNA/ R Lung Abscess  - Intubated 11/23 and course complicated with multi-bacterial PNA and lung abscesses.   - Lung abscess presented on CT CHEST 12/5  - s/p Tracheostomy on 12/18 by CTSx. Sutures out 1/1/2021  - Air leak noted and s/p Trach Exchange for Bivona with CTSx 12/31  - Continue on AC vent and PS as tolerated. Tolerated PS for >5 hr today 1/27  - Proventil, Chest PT and Suction PRN. Trach Care QD.   - Loose cough /secretions Fortaz/inhaled tobra   - Weaning in progress     # Vasoplegic vs Septic Shock   - Weaned off pressors. Monitor BP on Norvasc and Metoprolol.   - On full AC for Hx of DVT. CTA CHEST with no PEs.     # ARTEMIO s/p CRRT and HD   - ARTEMIO now resolved and no longer required HD.   - Monitor renal function and avoid nephrotoxins.     # Dysphagia   - Initially CTSx and GI deferred PEG given Ventral Hernia   - s/p PEG placement with IR 1/11   - Nauseous episodes noted and s/p Reglan PRN. Continue on TF and monitor tolerance.   - No new sx /tolerating feeds     # Sepsis second to COVID vs Multi-bacterial PNA/ Bacteremia and R Lung Abscess   - COVID with superimposed multi-bacterial VAP vs aspiration PNA vs cavitary PNA.   - SCx (11/24) MSSA and BCx (11/27) with MSSA and Proteus Bacteremia  - SCx (12/1, 12/12 and 12/27) with Stenotrophomonas.   - SCx (1/9) with  Pseudomonas and Stenotrophomonas   - BCX has been persistently negative from 12/1, and most recently 1/12   - CT CHEST 12/4 with right lung cavitation.   - s/p multiple antibiotics, but now on Fortaz and Flagyl (1/12-1/16)   - Monitor off antibiotics.   - No longer febrile and WBC downtrending. Blood cx NTD. Sputum growing gram neg but known hx of pseudo  tracheitis.  - CT chest ordered as has significant sputum production with cavitation on previous ct [place] improved   - Seen by ID Fortaz/inhaled tobra started     # Diarrhea   - metamucil bid   - TF changed to Peptimen  - Stool for c-diff   -flexiseal     # DM2   - Continue on ISS and NPH and monitor FS.   - Adjust insulin as needed.     # Hx of DVT   - Continue on Lovenox FULL DOSE for Xarelto for now.     # Skin/ Facial Wounds   - WOC recommendations appreciated   - Plastics evaluation and recommendations of facial wound appreciated     # DVT/ GI PPX with FULL AC/ PPI   # CODE STATUS - FULL CODE   # DISPO - Rehab likely Vent facility.

## 2021-01-29 NOTE — PROGRESS NOTE ADULT - ATTENDING COMMENTS
Agree with plan as outlined above. Patient seen and examined at bedside. Patient history, laboratory data, and imaging personally reviewed.    Pt is a 60F with PMHx as above presenting to [facility] for hypoxemic respiratory failure with ARDS 2/2 COVID19 PNA further c/b superimposed MSSA cavitary PNA and ARF requiring HD. Pt with failure to wean from mechanical ventilation, s/p tracheostomy placement 12/18 and transfer to RCU 12/28.     Pt now with significantly improving encephalopathy, able to communicate effectively and answer questions/follow commands. Remains off all IV sedation since 12/24. CT Head (-) 12/12. EEG (-). No indication for further neurologic w/u at this time given clinical improvement. OOB to chair as tolerated. Moves all 4 extremities independently, but remains physically severely deconditioned and dependent of ADLs.     Pt with trach placement (CTSx, 12/18 Proximal 7XLT and then changed to 8Fr Bivona 12/31 for persistent air leak), now with resolution of air leak and significant improvement in ventilator synchrony. Daily SBTs as tolerated. Pt tolerating 5/5, will attempt trach collar if tolerates. Will c/w airway clearance therapy and trach care as per RCU team.      Pt with hx MSSA bacteremia with cavitary PNA s/p completion of Abx with Primaxin on 12/28 followed by course of Ceftazidime through 1/5 for Stenotrophomonas PNA. Pt with recurrence of fevers and (+) sputum cx for Stenotrophomonas and Pseudomonas 1/12 s/p completion of Flagyl+Ceftazidime+Vancomycin with clinical improvement. Pt with low grade fevers, repeat cx again (+) for CRE Pseudomonas and Stenotrophomonas, initiated eradication therapy with IV+Nebulized Abx 1/28 given copious secretions and recurrent fevers. Repeat CT Chest shows significantly improved cavitary lesions from prior MSSA PNA without area requiring further source control.    Pt with oropharyngeal dysphagia, IR guided PEG successfully placed 1/11. Pt tolerating PEG feeds. Pt also initially with ARF on CRRT, following by intermittent HD (last session 12/17) with renal recovery. NPH+HISS for glucose control. Wound consult and plastic sx recs appreciated.    Dispo pending medical optimization, likely [facility].

## 2021-01-29 NOTE — PROGRESS NOTE ADULT - SUBJECTIVE AND OBJECTIVE BOX
CHIEF COMPLAINT: Patient is a 60y old  Female who presents with a chief complaint of Transfer from [facility] (28 Jan 2021 17:13)      Interval Events: Pt seen and evaluated at bedside with team.  ABX started     REVIEW OF SYSTEMS:  Constitutional:   Eyes:  ENT:  CV:  Resp:  GI:  :  MSK:  Integumentary:  Neurological:  Psychiatric:  Endocrine:  Hematologic/Lymphatic:  Allergic/Immunologic:  [ x] All other systems negative    OBJECTIVE:  ICU Vital Signs Last 24 Hrs  T(C): 37 (29 Jan 2021 05:13), Max: 37.1 (28 Jan 2021 21:16)  T(F): 98.6 (29 Jan 2021 05:13), Max: 98.8 (28 Jan 2021 21:16)  HR: 91 (29 Jan 2021 08:07) (83 - 109)  BP: 137/64 (29 Jan 2021 05:13) (137/58 - 156/77)  BP(mean): --  ABP: --  ABP(mean): --  RR: 22 (29 Jan 2021 05:13) (17 - 24)  SpO2: 99% (29 Jan 2021 08:07) (96% - 100%)    Mode: AC/ CMV (Assist Control/ Continuous Mandatory Ventilation), RR (machine): 12, TV (machine): 450, FiO2: 40, PEEP: 5, MAP: 15, PIP: 27    01-28 @ 07:01  -  01-29 @ 07:00  --------------------------------------------------------  IN: 1325 mL / OUT: 700 mL / NET: 625 mL      CAPILLARY BLOOD GLUCOSE      POCT Blood Glucose.: 130 mg/dL (29 Jan 2021 05:20)      HOSPITAL MEDICATIONS:  MEDICATIONS  (STANDING):  ALBUTerol    90 MICROgram(s) HFA Inhaler 2 Puff(s) Inhalation every 6 hours  amLODIPine   Tablet 5 milliGRAM(s) Oral daily  ascorbic acid 500 milliGRAM(s) Oral daily  cefTAZidime  IVPB 2 Gram(s) IV Intermittent every 8 hours  chlorhexidine 4% Liquid 1 Application(s) Topical daily  collagenase Ointment 1 Application(s) Topical two times a day  enoxaparin Injectable 100 milliGRAM(s) SubCutaneous every 12 hours  insulin lispro (ADMELOG) corrective regimen sliding scale   SubCutaneous every 6 hours  lactobacillus acidophilus 1 Tablet(s) Oral two times a day  metoprolol tartrate 50 milliGRAM(s) Oral two times a day  multivitamin/minerals/iron Oral Solution (CENTRUM) 15 milliLiter(s) Oral daily  pantoprazole   Suspension 40 milliGRAM(s) Oral daily  psyllium Powder 1 Packet(s) Oral two times a day  sodium bicarbonate 650 milliGRAM(s) Oral once  tobramycin for Nebulization 300 milliGRAM(s) Inhalation every 12 hours  Viokace 10,440 1 Tablet(s) 1 Tablet(s) Oral once  zinc sulfate 220 milliGRAM(s) Oral daily    MEDICATIONS  (PRN):  acetaminophen    Suspension .. 650 milliGRAM(s) Oral every 6 hours PRN Temp greater or equal to 38C (100.4F), Mild Pain (1 - 3), Moderate Pain (4 - 6)  artificial tears (preservative free) Ophthalmic Solution 1 Drop(s) Both EYES every 8 hours PRN Dry Eyes  oxyCODONE    Solution 5 milliGRAM(s) Oral every 4 hours PRN Severe Pain (7 - 10)      LABS:                        7.8    11.15 )-----------( 412      ( 29 Jan 2021 07:41 )             27.5     01-29    135  |  98  |  12  ----------------------------<  111<H>  3.8   |  26  |  0.23<L>    Ca    8.8      29 Jan 2021 07:41  Phos  3.1     01-29  Mg     1.5     01-29                MICROBIOLOGY:     RADIOLOGY:  [ ] Reviewed and interpreted by me    PULMONARY FUNCTION TESTS:    EKG: CHIEF COMPLAINT: Patient is a 60y old  Female who presents with a chief complaint of Transfer from [facility] (28 Jan 2021 17:13)      Interval Events: Pt seen and evaluated at bedside with team.  ABX started     REVIEW OF SYSTEMS:  Constitutional: No fever/chills/achiness   ENT: denies   CV: Denies   Resp: Denies   GI: + loose stool   MSK: Achiness  [ x] All other systems negative    OBJECTIVE:  ICU Vital Signs Last 24 Hrs  T(C): 37 (29 Jan 2021 05:13), Max: 37.1 (28 Jan 2021 21:16)  T(F): 98.6 (29 Jan 2021 05:13), Max: 98.8 (28 Jan 2021 21:16)  HR: 91 (29 Jan 2021 08:07) (83 - 109)  BP: 137/64 (29 Jan 2021 05:13) (137/58 - 156/77)  BP(mean): --  ABP: --  ABP(mean): --  RR: 22 (29 Jan 2021 05:13) (17 - 24)  SpO2: 99% (29 Jan 2021 08:07) (96% - 100%)    Mode: AC/ CMV (Assist Control/ Continuous Mandatory Ventilation), RR (machine): 12, TV (machine): 450, FiO2: 40, PEEP: 5, MAP: 15, PIP: 27    01-28 @ 07:01  -  01-29 @ 07:00  --------------------------------------------------------  IN: 1325 mL / OUT: 700 mL / NET: 625 mL      CAPILLARY BLOOD GLUCOSE      POCT Blood Glucose.: 130 mg/dL (29 Jan 2021 05:20)      HOSPITAL MEDICATIONS:  MEDICATIONS  (STANDING):  ALBUTerol    90 MICROgram(s) HFA Inhaler 2 Puff(s) Inhalation every 6 hours  amLODIPine   Tablet 5 milliGRAM(s) Oral daily  ascorbic acid 500 milliGRAM(s) Oral daily  cefTAZidime  IVPB 2 Gram(s) IV Intermittent every 8 hours  chlorhexidine 4% Liquid 1 Application(s) Topical daily  collagenase Ointment 1 Application(s) Topical two times a day  enoxaparin Injectable 100 milliGRAM(s) SubCutaneous every 12 hours  insulin lispro (ADMELOG) corrective regimen sliding scale   SubCutaneous every 6 hours  lactobacillus acidophilus 1 Tablet(s) Oral two times a day  metoprolol tartrate 50 milliGRAM(s) Oral two times a day  multivitamin/minerals/iron Oral Solution (CENTRUM) 15 milliLiter(s) Oral daily  pantoprazole   Suspension 40 milliGRAM(s) Oral daily  psyllium Powder 1 Packet(s) Oral two times a day  sodium bicarbonate 650 milliGRAM(s) Oral once  tobramycin for Nebulization 300 milliGRAM(s) Inhalation every 12 hours  Viokace 10,440 1 Tablet(s) 1 Tablet(s) Oral once  zinc sulfate 220 milliGRAM(s) Oral daily    MEDICATIONS  (PRN):  acetaminophen    Suspension .. 650 milliGRAM(s) Oral every 6 hours PRN Temp greater or equal to 38C (100.4F), Mild Pain (1 - 3), Moderate Pain (4 - 6)  artificial tears (preservative free) Ophthalmic Solution 1 Drop(s) Both EYES every 8 hours PRN Dry Eyes  oxyCODONE    Solution 5 milliGRAM(s) Oral every 4 hours PRN Severe Pain (7 - 10)      LABS:                        7.8    11.15 )-----------( 412      ( 29 Jan 2021 07:41 )             27.5     01-29    135  |  98  |  12  ----------------------------<  111<H>  3.8   |  26  |  0.23<L>    Ca    8.8      29 Jan 2021 07:41  Phos  3.1     01-29  Mg     1.5     01-29                MICROBIOLOGY:     RADIOLOGY:  [ ] Reviewed and interpreted by me    PULMONARY FUNCTION TESTS:    EKG:

## 2021-01-30 LAB
ANION GAP SERPL CALC-SCNC: 13 MMOL/L — SIGNIFICANT CHANGE UP (ref 7–14)
BASOPHILS # BLD AUTO: 0.08 K/UL — SIGNIFICANT CHANGE UP (ref 0–0.2)
BASOPHILS NFR BLD AUTO: 0.8 % — SIGNIFICANT CHANGE UP (ref 0–2)
BUN SERPL-MCNC: 7 MG/DL — SIGNIFICANT CHANGE UP (ref 7–23)
CALCIUM SERPL-MCNC: 9.2 MG/DL — SIGNIFICANT CHANGE UP (ref 8.4–10.5)
CHLORIDE SERPL-SCNC: 100 MMOL/L — SIGNIFICANT CHANGE UP (ref 98–107)
CO2 SERPL-SCNC: 28 MMOL/L — SIGNIFICANT CHANGE UP (ref 22–31)
CREAT SERPL-MCNC: 0.24 MG/DL — LOW (ref 0.5–1.3)
CULTURE RESULTS: SIGNIFICANT CHANGE UP
CULTURE RESULTS: SIGNIFICANT CHANGE UP
EOSINOPHIL # BLD AUTO: 0.47 K/UL — SIGNIFICANT CHANGE UP (ref 0–0.5)
EOSINOPHIL NFR BLD AUTO: 4.4 % — SIGNIFICANT CHANGE UP (ref 0–6)
GLUCOSE BLDC GLUCOMTR-MCNC: 116 MG/DL — HIGH (ref 70–99)
GLUCOSE BLDC GLUCOMTR-MCNC: 119 MG/DL — HIGH (ref 70–99)
GLUCOSE BLDC GLUCOMTR-MCNC: 123 MG/DL — HIGH (ref 70–99)
GLUCOSE BLDC GLUCOMTR-MCNC: 125 MG/DL — HIGH (ref 70–99)
GLUCOSE BLDC GLUCOMTR-MCNC: 153 MG/DL — HIGH (ref 70–99)
GLUCOSE SERPL-MCNC: 125 MG/DL — HIGH (ref 70–99)
HCT VFR BLD CALC: 34.5 % — SIGNIFICANT CHANGE UP (ref 34.5–45)
HGB BLD-MCNC: 9.8 G/DL — LOW (ref 11.5–15.5)
IANC: 7.63 K/UL — SIGNIFICANT CHANGE UP (ref 1.5–8.5)
IMM GRANULOCYTES NFR BLD AUTO: 2.2 % — HIGH (ref 0–1.5)
LYMPHOCYTES # BLD AUTO: 1.54 K/UL — SIGNIFICANT CHANGE UP (ref 1–3.3)
LYMPHOCYTES # BLD AUTO: 14.5 % — SIGNIFICANT CHANGE UP (ref 13–44)
MAGNESIUM SERPL-MCNC: 1.8 MG/DL — SIGNIFICANT CHANGE UP (ref 1.6–2.6)
MCHC RBC-ENTMCNC: 27.4 PG — SIGNIFICANT CHANGE UP (ref 27–34)
MCHC RBC-ENTMCNC: 28.4 GM/DL — LOW (ref 32–36)
MCV RBC AUTO: 96.4 FL — SIGNIFICANT CHANGE UP (ref 80–100)
MONOCYTES # BLD AUTO: 0.66 K/UL — SIGNIFICANT CHANGE UP (ref 0–0.9)
MONOCYTES NFR BLD AUTO: 6.2 % — SIGNIFICANT CHANGE UP (ref 2–14)
NEUTROPHILS # BLD AUTO: 7.63 K/UL — HIGH (ref 1.8–7.4)
NEUTROPHILS NFR BLD AUTO: 71.9 % — SIGNIFICANT CHANGE UP (ref 43–77)
NRBC # BLD: 0 /100 WBCS — SIGNIFICANT CHANGE UP
NRBC # FLD: 0.02 K/UL — HIGH
PHOSPHATE SERPL-MCNC: 3.4 MG/DL — SIGNIFICANT CHANGE UP (ref 2.5–4.5)
PLATELET # BLD AUTO: 470 K/UL — HIGH (ref 150–400)
POTASSIUM SERPL-MCNC: 3.6 MMOL/L — SIGNIFICANT CHANGE UP (ref 3.5–5.3)
POTASSIUM SERPL-SCNC: 3.6 MMOL/L — SIGNIFICANT CHANGE UP (ref 3.5–5.3)
RBC # BLD: 3.58 M/UL — LOW (ref 3.8–5.2)
RBC # FLD: 17 % — HIGH (ref 10.3–14.5)
SODIUM SERPL-SCNC: 141 MMOL/L — SIGNIFICANT CHANGE UP (ref 135–145)
SPECIMEN SOURCE: SIGNIFICANT CHANGE UP
SPECIMEN SOURCE: SIGNIFICANT CHANGE UP
WBC # BLD: 10.61 K/UL — HIGH (ref 3.8–10.5)
WBC # FLD AUTO: 10.61 K/UL — HIGH (ref 3.8–10.5)

## 2021-01-30 PROCEDURE — 99233 SBSQ HOSP IP/OBS HIGH 50: CPT | Mod: GC

## 2021-01-30 RX ORDER — SODIUM BICARBONATE 1 MEQ/ML
650 SYRINGE (ML) INTRAVENOUS ONCE
Refills: 0 | Status: COMPLETED | OUTPATIENT
Start: 2021-01-30 | End: 2021-01-30

## 2021-01-30 RX ORDER — METOPROLOL TARTRATE 50 MG
5 TABLET ORAL ONCE
Refills: 0 | Status: COMPLETED | OUTPATIENT
Start: 2021-01-30 | End: 2021-01-30

## 2021-01-30 RX ORDER — LANOLIN ALCOHOL/MO/W.PET/CERES
3 CREAM (GRAM) TOPICAL ONCE
Refills: 0 | Status: COMPLETED | OUTPATIENT
Start: 2021-01-30 | End: 2021-01-30

## 2021-01-30 RX ORDER — SODIUM BICARBONATE 1 MEQ/ML
650 SYRINGE (ML) INTRAVENOUS ONCE
Refills: 0 | Status: COMPLETED | OUTPATIENT
Start: 2021-01-30 | End: 2021-01-31

## 2021-01-30 RX ADMIN — ENOXAPARIN SODIUM 100 MILLIGRAM(S): 100 INJECTION SUBCUTANEOUS at 18:49

## 2021-01-30 RX ADMIN — ALBUTEROL 2 PUFF(S): 90 AEROSOL, METERED ORAL at 12:49

## 2021-01-30 RX ADMIN — CEFTAZIDIME 100 GRAM(S): 6 INJECTION, POWDER, FOR SOLUTION INTRAVENOUS at 16:45

## 2021-01-30 RX ADMIN — Medication 2: at 23:55

## 2021-01-30 RX ADMIN — CEFTAZIDIME 100 GRAM(S): 6 INJECTION, POWDER, FOR SOLUTION INTRAVENOUS at 22:42

## 2021-01-30 RX ADMIN — Medication 3 MILLIGRAM(S): at 22:41

## 2021-01-30 RX ADMIN — Medication 650 MILLIGRAM(S): at 13:31

## 2021-01-30 RX ADMIN — Medication 5 MILLIGRAM(S): at 16:44

## 2021-01-30 RX ADMIN — ALBUTEROL 2 PUFF(S): 90 AEROSOL, METERED ORAL at 21:40

## 2021-01-30 RX ADMIN — ALBUTEROL 2 PUFF(S): 90 AEROSOL, METERED ORAL at 04:19

## 2021-01-30 RX ADMIN — ENOXAPARIN SODIUM 100 MILLIGRAM(S): 100 INJECTION SUBCUTANEOUS at 05:01

## 2021-01-30 RX ADMIN — Medication 650 MILLIGRAM(S): at 01:15

## 2021-01-30 RX ADMIN — ALBUTEROL 2 PUFF(S): 90 AEROSOL, METERED ORAL at 16:03

## 2021-01-30 RX ADMIN — Medication 300 MILLIGRAM(S): at 12:50

## 2021-01-30 RX ADMIN — Medication 1 APPLICATION(S): at 16:47

## 2021-01-30 RX ADMIN — Medication 1 PACKET(S): at 16:47

## 2021-01-30 RX ADMIN — Medication 1 DROP(S): at 06:47

## 2021-01-30 RX ADMIN — Medication 300 MILLIGRAM(S): at 21:45

## 2021-01-30 RX ADMIN — Medication 1 APPLICATION(S): at 05:01

## 2021-01-30 RX ADMIN — CEFTAZIDIME 100 GRAM(S): 6 INJECTION, POWDER, FOR SOLUTION INTRAVENOUS at 05:00

## 2021-01-30 RX ADMIN — Medication 1 TABLET(S): at 16:46

## 2021-01-30 RX ADMIN — Medication 650 MILLIGRAM(S): at 22:41

## 2021-01-30 RX ADMIN — CHLORHEXIDINE GLUCONATE 1 APPLICATION(S): 213 SOLUTION TOPICAL at 12:00

## 2021-01-30 NOTE — PROGRESS NOTE ADULT - SUBJECTIVE AND OBJECTIVE BOX
CHIEF COMPLAINT: Patient is a 60y old  Female who presents with a chief complaint of Transfer from [facility] (29 Jan 2021 08:36)      Interval Events: Pt seen and evaluated by team at bedside  ABX in progress for     REVIEW OF SYSTEMS:  Constitutional:   Eyes:  ENT:  CV:  Resp:  GI:  :  MSK:  Integumentary:  Neurological:  Psychiatric:  Endocrine:  Hematologic/Lymphatic:  Allergic/Immunologic:  [x ] All other systems negative      OBJECTIVE:  ICU Vital Signs Last 24 Hrs  T(C): 36.8 (30 Jan 2021 04:46), Max: 37 (29 Jan 2021 14:05)  T(F): 98.3 (30 Jan 2021 04:46), Max: 98.6 (29 Jan 2021 14:05)  HR: 99 (30 Jan 2021 04:46) (83 - 99)  BP: 146/74 (30 Jan 2021 04:46) (140/65 - 154/60)  BP(mean): --  ABP: --  ABP(mean): --  RR: 28 (30 Jan 2021 04:46) (24 - 28)  SpO2: 98% (30 Jan 2021 04:46) (94% - 100%)    Mode: CPAP with PS, FiO2: 30, PEEP: 5, PS: 8, MAP: 8, PIP: 16    CAPILLARY BLOOD GLUCOSE      POCT Blood Glucose.: 119 mg/dL (30 Jan 2021 06:46)        HOSPITAL MEDICATIONS:  MEDICATIONS  (STANDING):  ALBUTerol    90 MICROgram(s) HFA Inhaler 2 Puff(s) Inhalation every 6 hours  amLODIPine   Tablet 5 milliGRAM(s) Oral daily  ascorbic acid 500 milliGRAM(s) Oral daily  cefTAZidime  IVPB 2 Gram(s) IV Intermittent every 8 hours  chlorhexidine 4% Liquid 1 Application(s) Topical daily  collagenase Ointment 1 Application(s) Topical two times a day  enoxaparin Injectable 100 milliGRAM(s) SubCutaneous every 12 hours  insulin lispro (ADMELOG) corrective regimen sliding scale   SubCutaneous every 6 hours  lactobacillus acidophilus 1 Tablet(s) Oral two times a day  metoprolol tartrate 50 milliGRAM(s) Oral two times a day  multivitamin/minerals/iron Oral Solution (CENTRUM) 15 milliLiter(s) Oral daily  pantoprazole   Suspension 40 milliGRAM(s) Oral daily  psyllium Powder 1 Packet(s) Oral two times a day  tobramycin for Nebulization 300 milliGRAM(s) Inhalation every 12 hours  Viokace 10,440 1 Tablet(s) 1 Tablet(s) Oral once  zinc sulfate 220 milliGRAM(s) Oral daily    MEDICATIONS  (PRN):  acetaminophen    Suspension .. 650 milliGRAM(s) Oral every 6 hours PRN Temp greater or equal to 38C (100.4F), Mild Pain (1 - 3), Moderate Pain (4 - 6)  artificial tears (preservative free) Ophthalmic Solution 1 Drop(s) Both EYES every 8 hours PRN Dry Eyes  oxyCODONE    Solution 5 milliGRAM(s) Oral every 4 hours PRN Severe Pain (7 - 10)      LABS:                        9.8    10.61 )-----------( 470      ( 30 Jan 2021 07:06 )             34.5     01-29    135  |  98  |  12  ----------------------------<  111<H>  3.8   |  26  |  0.23<L>    Ca    8.8      29 Jan 2021 07:41  Phos  3.1     01-29  Mg     1.5     01-29                MICROBIOLOGY:     RADIOLOGY:  [ ] Reviewed and interpreted by me    PULMONARY FUNCTION TESTS:    EKG: CHIEF COMPLAINT: Patient is a 60y old  Female who presents with a chief complaint of Transfer from [facility] (29 Jan 2021 08:36)      Interval Events: Pt seen and evaluated by team at bedside  ABX in progress for     REVIEW OF SYSTEMS:  Constitutional: No pain  ENT: Denies   CV: Denies   Resp: Denies   GI: Denies   MSK: Sore  [x ] All other systems negative      OBJECTIVE:  ICU Vital Signs Last 24 Hrs  T(C): 36.8 (30 Jan 2021 04:46), Max: 37 (29 Jan 2021 14:05)  T(F): 98.3 (30 Jan 2021 04:46), Max: 98.6 (29 Jan 2021 14:05)  HR: 99 (30 Jan 2021 04:46) (83 - 99)  BP: 146/74 (30 Jan 2021 04:46) (140/65 - 154/60)  BP(mean): --  ABP: --  ABP(mean): --  RR: 28 (30 Jan 2021 04:46) (24 - 28)  SpO2: 98% (30 Jan 2021 04:46) (94% - 100%)    Mode: CPAP with PS, FiO2: 30, PEEP: 5, PS: 8, MAP: 8, PIP: 16    CAPILLARY BLOOD GLUCOSE      POCT Blood Glucose.: 119 mg/dL (30 Jan 2021 06:46)        HOSPITAL MEDICATIONS:  MEDICATIONS  (STANDING):  ALBUTerol    90 MICROgram(s) HFA Inhaler 2 Puff(s) Inhalation every 6 hours  amLODIPine   Tablet 5 milliGRAM(s) Oral daily  ascorbic acid 500 milliGRAM(s) Oral daily  cefTAZidime  IVPB 2 Gram(s) IV Intermittent every 8 hours  chlorhexidine 4% Liquid 1 Application(s) Topical daily  collagenase Ointment 1 Application(s) Topical two times a day  enoxaparin Injectable 100 milliGRAM(s) SubCutaneous every 12 hours  insulin lispro (ADMELOG) corrective regimen sliding scale   SubCutaneous every 6 hours  lactobacillus acidophilus 1 Tablet(s) Oral two times a day  metoprolol tartrate 50 milliGRAM(s) Oral two times a day  multivitamin/minerals/iron Oral Solution (CENTRUM) 15 milliLiter(s) Oral daily  pantoprazole   Suspension 40 milliGRAM(s) Oral daily  psyllium Powder 1 Packet(s) Oral two times a day  tobramycin for Nebulization 300 milliGRAM(s) Inhalation every 12 hours  Viokace 10,440 1 Tablet(s) 1 Tablet(s) Oral once  zinc sulfate 220 milliGRAM(s) Oral daily    MEDICATIONS  (PRN):  acetaminophen    Suspension .. 650 milliGRAM(s) Oral every 6 hours PRN Temp greater or equal to 38C (100.4F), Mild Pain (1 - 3), Moderate Pain (4 - 6)  artificial tears (preservative free) Ophthalmic Solution 1 Drop(s) Both EYES every 8 hours PRN Dry Eyes  oxyCODONE    Solution 5 milliGRAM(s) Oral every 4 hours PRN Severe Pain (7 - 10)      LABS:                        9.8    10.61 )-----------( 470      ( 30 Jan 2021 07:06 )             34.5     01-29    135  |  98  |  12  ----------------------------<  111<H>  3.8   |  26  |  0.23<L>    Ca    8.8      29 Jan 2021 07:41  Phos  3.1     01-29  Mg     1.5     01-29                MICROBIOLOGY:     RADIOLOGY:  [ ] Reviewed and interpreted by me    PULMONARY FUNCTION TESTS:    EKG:

## 2021-01-30 NOTE — PROGRESS NOTE ADULT - ASSESSMENT
59 YO F with PMHx of HTN, DVT on Xarelto, Peritonitis s/p Oral's Procedure and Ileostomy (reversed in 2011) and AARON who presented to [facility] on 11/18 for AHRF second to COVID 19, intubated on 11/23 complicated by lung abscesses on CT CHEST 12/5, multi-bacterial PNA and bacteremia, ARTEMIO s/p CRRT and HD, and s/p Tracheostomy 12/18. Transferred to RCU for further management.     # AMS   - AOX4 at baseline.   - CTH and EEG WNL   - Now weaned off all sedation.   - Monitor mentation and supportive care   - Alert/follows commands     # ARDS second to COVID vs Multi-bacterial PNA/ R Lung Abscess  - Intubated 11/23 and course complicated with multi-bacterial PNA and lung abscesses.   - Lung abscess presented on CT CHEST 12/5  - s/p Tracheostomy on 12/18 by CTSx. Sutures out 1/1/2021  - Air leak noted and s/p Trach Exchange for Bivona with CTSx 12/31  - Continue on AC vent and PS as tolerated. Tolerated PS for >5 hr today 1/27  - Proventil, Chest PT and Suction PRN. Trach Care QD.   - Loose cough /secretions Fortaz/inhaled tobra   - Weaning in progress     # Vasoplegic vs Septic Shock   - Weaned off pressors. Monitor BP on Norvasc and Metoprolol.   - On full AC for Hx of DVT. CTA CHEST with no PEs.     # ARTEMIO s/p CRRT and HD   - ARTEMIO now resolved and no longer required HD.   - Monitor renal function and avoid nephrotoxins.     # Dysphagia   - Initially CTSx and GI deferred PEG given Ventral Hernia   - s/p PEG placement with IR 1/11   - Nauseous episodes noted and s/p Reglan PRN. Continue on TF and monitor tolerance.   - No new sx /tolerating feeds     # Sepsis second to COVID vs Multi-bacterial PNA/ Bacteremia and R Lung Abscess   - COVID with superimposed multi-bacterial VAP vs aspiration PNA vs cavitary PNA.   - SCx (11/24) MSSA and BCx (11/27) with MSSA and Proteus Bacteremia  - SCx (12/1, 12/12 and 12/27) with Stenotrophomonas.   - SCx (1/9) with  Pseudomonas and Stenotrophomonas   - BCX has been persistently negative from 12/1, and most recently 1/12   - CT CHEST 12/4 with right lung cavitation.   - s/p multiple antibiotics, but now on Fortaz and Flagyl (1/12-1/16)   - Monitor off antibiotics.   - No longer febrile and WBC downtrending. Blood cx NTD. Sputum growing gram neg but known hx of pseudo  tracheitis.  - CT chest ordered as has significant sputum production with cavitation on previous ct Abbesses improved   - Seen by ID Fortaz/inhaled tobra started     # Diarrhea   - metamucil bid   - TF changed to Peptimen  - Stool for c-diff   -flexiseal     # DM2   - Continue on ISS and NPH and monitor FS.   - Adjust insulin as needed.     # Hx of DVT   - Continue on Lovenox FULL DOSE for Xarelto for now.     # Skin/ Facial Wounds   - WOC recommendations appreciated   - Plastics evaluation and recommendations of facial wound appreciated     # DVT/ GI PPX with FULL AC/ PPI   # CODE STATUS - FULL CODE   # DISPO - Rehab likely Vent facility.    59 YO F with PMHx of HTN, DVT on Xarelto, Peritonitis s/p Oral's Procedure and Ileostomy (reversed in 2011) and AARON who presented to [facility] on 11/18 for AHRF second to COVID 19, intubated on 11/23 complicated by lung abscesses on CT CHEST 12/5, multi-bacterial PNA and bacteremia, ARTEMIO s/p CRRT and HD, and s/p Tracheostomy 12/18. Transferred to RCU for further management.     # AMS   - AOX4 at baseline.   - CTH and EEG WNL   - Now weaned off all sedation.   - Monitor mentation and supportive care   - Alert/follows commands     # ARDS second to COVID vs Multi-bacterial PNA/ R Lung Abscess  - Intubated 11/23 and course complicated with multi-bacterial PNA and lung abscesses.   - Lung abscess presented on CT CHEST 12/5  - s/p Tracheostomy on 12/18 by CTSx. Sutures out 1/1/2021  - Air leak noted and s/p Trach Exchange for Bivona with CTSx 12/31  - Continue on AC vent and PS as tolerated. Tolerated PS for >5 hr today 1/27  - Proventil, Chest PT and Suction PRN. Trach Care QD.   - Loose cough /secretions Fortaz/inhaled tobra   - COughing secretions improved - some bld tinged secretions   - Weaning in progress     # Vasoplegic vs Septic Shock   - Weaned off pressors. Monitor BP on Norvasc and Metoprolol.   - On full AC for Hx of DVT. CTA CHEST with no PEs.     # ARTEMIO s/p CRRT and HD   - ARTEMIO now resolved and no longer required HD.   - Monitor renal function and avoid nephrotoxins.     # Dysphagia   - Initially CTSx and GI deferred PEG given Ventral Hernia   - s/p PEG placement with IR 1/11   - Nauseous episodes noted and s/p Reglan PRN. Continue on TF and monitor tolerance.   - No new sx /tolerating feeds     # Sepsis second to COVID vs Multi-bacterial PNA/ Bacteremia and R Lung Abscess   - COVID with superimposed multi-bacterial VAP vs aspiration PNA vs cavitary PNA.   - SCx (11/24) MSSA and BCx (11/27) with MSSA and Proteus Bacteremia  - SCx (12/1, 12/12 and 12/27) with Stenotrophomonas.   - SCx (1/9) with  Pseudomonas and Stenotrophomonas   - BCX has been persistently negative from 12/1, and most recently 1/12   - CT CHEST 12/4 with right lung cavitation.   - s/p multiple antibiotics, but now on Fortaz and Flagyl (1/12-1/16)   - Monitor off antibiotics.   - No longer febrile and WBC downtrending. Blood cx NTD. Sputum growing gram neg but known hx of pseudo  tracheitis.  - CT chest ordered as has significant sputum production with cavitation on previous ct Abbesses improved   - Seen by ID Fortaz/inhaled tobra started for pseudomonas     # Diarrhea   - metamucil bid   - TF changed to Peptimen  - Stool for c-diff   -flexiseal     # DM2   - Continue on ISS and NPH and monitor FS.   - Adjust insulin as needed.     # Hx of DVT   - Continue on Lovenox FULL DOSE for Xarelto for now.     # Skin/ Facial Wounds   - WOC recommendations appreciated   - Plastics evaluation and recommendations of facial wound appreciated     # DVT/ GI PPX with FULL AC/ PPI   # CODE STATUS - FULL CODE   # DISPO - Rehab likely Vent facility.

## 2021-01-30 NOTE — PROGRESS NOTE ADULT - ATTENDING COMMENTS
60F a/w COVID 19, acute hypoxemic respiratory failure with ARDS c/b superimposed MSSA cavitary PNA and ARF requiring HD. Pt with failure to wean from mechanical ventilation, s/p tracheostomy placement 12/18 and transfer to RCU 12/28.     1. Neuro - encephalopathy much improved, very deconditioned    2. Pulm - acute respiratory failure - trach placement by CTSx, 12/18 Proximal 7XLT and then changed to 8Fr Bivona 12/31 for persistent air leak  c/w daily SBTs- tolerating PS 5/5, will attempt trach collar  c/w airway clearance therapy and trach care    3. ID - MSSA bacteremia with cavitary PNA s/p completion of Primaxin on 12/28 and Ceftazidime through 1/5 for Stenotrophomonas PNA.   Recurrence of fevers and (+) sputum cx for Stenotrophomonas and Pseudomonas 1/12 s/p completion of Flagyl+Ceftazidime+Vancomycin  Pt with low grade fevers, repeat cx again (+) for CRE Pseudomonas and Stenotrophomonas, initiated eradication therapy with IV+Nebulized Abx 1/28 given copious secretions and recurrent fevers.  Repeat CT Chest shows significantly improved cavitary lesions from prior MSSA PNA without area requiring further source control.    4. GI -oropharyngeal dysphagia, IR guided PEG successfully placed 1/11. Pt tolerating PEG feeds.     5. Renal - prior ARF on CRRT, following by intermittent HD (last session 12/17) with renal recovery.     6. Endo - DM - NPH+HISS for glucose control.     7. Derm - Wound consult and plastic sx recs appreciated.    8. PPX - pud and DVT     Dispo- [facility] when medically optimized

## 2021-01-31 LAB
ANION GAP SERPL CALC-SCNC: 8 MMOL/L — SIGNIFICANT CHANGE UP (ref 7–14)
BASOPHILS # BLD AUTO: 0.05 K/UL — SIGNIFICANT CHANGE UP (ref 0–0.2)
BASOPHILS NFR BLD AUTO: 0.5 % — SIGNIFICANT CHANGE UP (ref 0–2)
BUN SERPL-MCNC: 6 MG/DL — LOW (ref 7–23)
CALCIUM SERPL-MCNC: 9.3 MG/DL — SIGNIFICANT CHANGE UP (ref 8.4–10.5)
CHLORIDE SERPL-SCNC: 99 MMOL/L — SIGNIFICANT CHANGE UP (ref 98–107)
CO2 SERPL-SCNC: 30 MMOL/L — SIGNIFICANT CHANGE UP (ref 22–31)
CREAT SERPL-MCNC: 0.24 MG/DL — LOW (ref 0.5–1.3)
EOSINOPHIL # BLD AUTO: 0.49 K/UL — SIGNIFICANT CHANGE UP (ref 0–0.5)
EOSINOPHIL NFR BLD AUTO: 4.9 % — SIGNIFICANT CHANGE UP (ref 0–6)
GLUCOSE BLDC GLUCOMTR-MCNC: 116 MG/DL — HIGH (ref 70–99)
GLUCOSE BLDC GLUCOMTR-MCNC: 128 MG/DL — HIGH (ref 70–99)
GLUCOSE BLDC GLUCOMTR-MCNC: 133 MG/DL — HIGH (ref 70–99)
GLUCOSE SERPL-MCNC: 124 MG/DL — HIGH (ref 70–99)
HCT VFR BLD CALC: 31.4 % — LOW (ref 34.5–45)
HGB BLD-MCNC: 9.2 G/DL — LOW (ref 11.5–15.5)
IANC: 6.46 K/UL — SIGNIFICANT CHANGE UP (ref 1.5–8.5)
IMM GRANULOCYTES NFR BLD AUTO: 1.8 % — HIGH (ref 0–1.5)
LYMPHOCYTES # BLD AUTO: 2.01 K/UL — SIGNIFICANT CHANGE UP (ref 1–3.3)
LYMPHOCYTES # BLD AUTO: 20.2 % — SIGNIFICANT CHANGE UP (ref 13–44)
MAGNESIUM SERPL-MCNC: 1.7 MG/DL — SIGNIFICANT CHANGE UP (ref 1.6–2.6)
MCHC RBC-ENTMCNC: 28.1 PG — SIGNIFICANT CHANGE UP (ref 27–34)
MCHC RBC-ENTMCNC: 29.3 GM/DL — LOW (ref 32–36)
MCV RBC AUTO: 96 FL — SIGNIFICANT CHANGE UP (ref 80–100)
MONOCYTES # BLD AUTO: 0.78 K/UL — SIGNIFICANT CHANGE UP (ref 0–0.9)
MONOCYTES NFR BLD AUTO: 7.8 % — SIGNIFICANT CHANGE UP (ref 2–14)
NEUTROPHILS # BLD AUTO: 6.46 K/UL — SIGNIFICANT CHANGE UP (ref 1.8–7.4)
NEUTROPHILS NFR BLD AUTO: 64.8 % — SIGNIFICANT CHANGE UP (ref 43–77)
NRBC # BLD: 0 /100 WBCS — SIGNIFICANT CHANGE UP
NRBC # FLD: 0.02 K/UL — HIGH
PHOSPHATE SERPL-MCNC: 3.7 MG/DL — SIGNIFICANT CHANGE UP (ref 2.5–4.5)
PLATELET # BLD AUTO: 443 K/UL — HIGH (ref 150–400)
POTASSIUM SERPL-MCNC: 3.3 MMOL/L — LOW (ref 3.5–5.3)
POTASSIUM SERPL-SCNC: 3.3 MMOL/L — LOW (ref 3.5–5.3)
RBC # BLD: 3.27 M/UL — LOW (ref 3.8–5.2)
RBC # FLD: 17.2 % — HIGH (ref 10.3–14.5)
SODIUM SERPL-SCNC: 137 MMOL/L — SIGNIFICANT CHANGE UP (ref 135–145)
WBC # BLD: 9.97 K/UL — SIGNIFICANT CHANGE UP (ref 3.8–10.5)
WBC # FLD AUTO: 9.97 K/UL — SIGNIFICANT CHANGE UP (ref 3.8–10.5)

## 2021-01-31 PROCEDURE — 99233 SBSQ HOSP IP/OBS HIGH 50: CPT | Mod: GC

## 2021-01-31 RX ORDER — POTASSIUM CHLORIDE 20 MEQ
40 PACKET (EA) ORAL EVERY 4 HOURS
Refills: 0 | Status: COMPLETED | OUTPATIENT
Start: 2021-01-31 | End: 2021-01-31

## 2021-01-31 RX ADMIN — AMLODIPINE BESYLATE 5 MILLIGRAM(S): 2.5 TABLET ORAL at 06:53

## 2021-01-31 RX ADMIN — CEFTAZIDIME 100 GRAM(S): 6 INJECTION, POWDER, FOR SOLUTION INTRAVENOUS at 21:49

## 2021-01-31 RX ADMIN — Medication 15 MILLILITER(S): at 12:25

## 2021-01-31 RX ADMIN — ALBUTEROL 2 PUFF(S): 90 AEROSOL, METERED ORAL at 09:47

## 2021-01-31 RX ADMIN — ENOXAPARIN SODIUM 100 MILLIGRAM(S): 100 INJECTION SUBCUTANEOUS at 18:46

## 2021-01-31 RX ADMIN — Medication 1 PACKET(S): at 06:53

## 2021-01-31 RX ADMIN — CEFTAZIDIME 100 GRAM(S): 6 INJECTION, POWDER, FOR SOLUTION INTRAVENOUS at 06:52

## 2021-01-31 RX ADMIN — ALBUTEROL 2 PUFF(S): 90 AEROSOL, METERED ORAL at 13:38

## 2021-01-31 RX ADMIN — Medication 40 MILLIEQUIVALENT(S): at 12:24

## 2021-01-31 RX ADMIN — ENOXAPARIN SODIUM 100 MILLIGRAM(S): 100 INJECTION SUBCUTANEOUS at 06:53

## 2021-01-31 RX ADMIN — CEFTAZIDIME 100 GRAM(S): 6 INJECTION, POWDER, FOR SOLUTION INTRAVENOUS at 14:39

## 2021-01-31 RX ADMIN — PANTOPRAZOLE SODIUM 40 MILLIGRAM(S): 20 TABLET, DELAYED RELEASE ORAL at 12:25

## 2021-01-31 RX ADMIN — ALBUTEROL 2 PUFF(S): 90 AEROSOL, METERED ORAL at 23:03

## 2021-01-31 RX ADMIN — Medication 40 MILLIEQUIVALENT(S): at 18:44

## 2021-01-31 RX ADMIN — Medication 300 MILLIGRAM(S): at 09:48

## 2021-01-31 RX ADMIN — Medication 1 TABLET(S): at 18:46

## 2021-01-31 RX ADMIN — ZINC SULFATE TAB 220 MG (50 MG ZINC EQUIVALENT) 220 MILLIGRAM(S): 220 (50 ZN) TAB at 12:25

## 2021-01-31 RX ADMIN — Medication 1 TABLET(S): at 06:53

## 2021-01-31 RX ADMIN — Medication 1 APPLICATION(S): at 18:46

## 2021-01-31 RX ADMIN — Medication 50 MILLIGRAM(S): at 06:53

## 2021-01-31 RX ADMIN — OXYCODONE HYDROCHLORIDE 5 MILLIGRAM(S): 5 TABLET ORAL at 18:44

## 2021-01-31 RX ADMIN — ALBUTEROL 2 PUFF(S): 90 AEROSOL, METERED ORAL at 04:54

## 2021-01-31 RX ADMIN — Medication 1 APPLICATION(S): at 06:54

## 2021-01-31 RX ADMIN — Medication 500 MILLIGRAM(S): at 12:23

## 2021-01-31 RX ADMIN — Medication 650 MILLIGRAM(S): at 12:16

## 2021-01-31 RX ADMIN — Medication 1 PACKET(S): at 18:47

## 2021-01-31 RX ADMIN — Medication 650 MILLIGRAM(S): at 06:00

## 2021-01-31 RX ADMIN — Medication 300 MILLIGRAM(S): at 23:03

## 2021-01-31 RX ADMIN — Medication 50 MILLIGRAM(S): at 18:46

## 2021-01-31 RX ADMIN — CHLORHEXIDINE GLUCONATE 1 APPLICATION(S): 213 SOLUTION TOPICAL at 14:39

## 2021-01-31 NOTE — PROGRESS NOTE ADULT - ATTENDING COMMENTS
60F a/w COVID 19, acute hypoxemic respiratory failure with ARDS c/b superimposed MSSA cavitary PNA and ARF requiring HD. Pt with failure to wean from mechanical ventilation, s/p tracheostomy placement 12/18 and transfer to RCU 12/28.     1. Neuro - encephalopathy much improved, very deconditioned    2. Pulm - acute respiratory failure - trach placement by CTSx, 12/18 Proximal 7XLT and then changed to 8Fr Bivona 12/31 for persistent air leak  c/w daily SBTs- tolerating PS 5/5, will attempt trach collar  c/w airway clearance therapy and trach care    3. ID - MSSA bacteremia with cavitary PNA s/p completion of Primaxin on 12/28 and Ceftazidime through 1/5 for Stenotrophomonas PNA.   Recurrence of fevers and (+) sputum cx for Stenotrophomonas and Pseudomonas 1/12 s/p completion of Flagyl+Ceftazidime+Vancomycin  Pt with low grade fevers, repeat cx again (+) for CRE Pseudomonas and Stenotrophomonas, initiated eradication therapy with IV+Nebulized Abx 1/28 given copious secretions and recurrent fevers.  Repeat CT Chest shows significantly improved cavitary lesions from prior MSSA PNA without area requiring further source control.    4. GI -oropharyngeal dysphagia, IR guided PEG successfully placed 1/11. Pt tolerating PEG feeds.     5. Renal - ARF resolved, monitor urine output    6. Endo - DM - NPH+HISS for glucose control.     7. Derm - Wound consult and plastic sx recs appreciated.    8. PPX - pud and DVT     Dispo- [facility]JEL[facility] when medically optimized .     I was physically present for the key portions of the evaluation and management (E/M) service provided.  I agree with the above history, physical, and plan which I have reviewed and edited where appropriate.     35 minutes spent on total encounter; more than 50% of the visit was spent counseling and/or coordinating care by the attending physician.     Plan discussed with RCU team.

## 2021-01-31 NOTE — PROGRESS NOTE ADULT - SUBJECTIVE AND OBJECTIVE BOX
CHIEF COMPLAINT:    Interval Events:    REVIEW OF SYSTEMS:  Constitutional:   Eyes:  ENT:  CV:  Resp:  GI:  :  MSK:  Integumentary:  Neurological:  Psychiatric:  Endocrine:  Hematologic/Lymphatic:  Allergic/Immunologic:  [ ] All other systems negative  [ ] Unable to assess ROS because ________    OBJECTIVE:  ICU Vital Signs Last 24 Hrs  T(C): 36.4 (31 Jan 2021 05:55), Max: 37 (30 Jan 2021 14:55)  T(F): 97.6 (31 Jan 2021 05:55), Max: 98.6 (30 Jan 2021 14:55)  HR: 89 (31 Jan 2021 05:55) (81 - 110)  BP: 154/71 (31 Jan 2021 05:55) (149/71 - 158/73)  BP(mean): --  ABP: --  ABP(mean): --  RR: 20 (31 Jan 2021 05:55) (20 - 24)  SpO2: 99% (31 Jan 2021 05:55) (96% - 100%)    Mode: AC/ CMV (Assist Control/ Continuous Mandatory Ventilation), RR (machine): 12, TV (machine): 450, FiO2: 30, PEEP: 5, MAP: 13, PIP: 31    01-30 @ 07:01  -  01-31 @ 07:00  --------------------------------------------------------  IN: 568 mL / OUT: 550 mL / NET: 18 mL      CAPILLARY BLOOD GLUCOSE      POCT Blood Glucose.: 116 mg/dL (31 Jan 2021 05:19)      PHYSICAL EXAM:  General:   HEENT:   Lymph Nodes:  Neck:   Respiratory:   Cardiovascular:   Abdomen:   Extremities:   Skin:   Neurological:  Psychiatry:    HOSPITAL MEDICATIONS:  MEDICATIONS  (STANDING):  ALBUTerol    90 MICROgram(s) HFA Inhaler 2 Puff(s) Inhalation every 6 hours  amLODIPine   Tablet 5 milliGRAM(s) Oral daily  ascorbic acid 500 milliGRAM(s) Oral daily  cefTAZidime  IVPB 2 Gram(s) IV Intermittent every 8 hours  chlorhexidine 4% Liquid 1 Application(s) Topical daily  collagenase Ointment 1 Application(s) Topical two times a day  enoxaparin Injectable 100 milliGRAM(s) SubCutaneous every 12 hours  insulin lispro (ADMELOG) corrective regimen sliding scale   SubCutaneous every 6 hours  lactobacillus acidophilus 1 Tablet(s) Oral two times a day  metoprolol tartrate 50 milliGRAM(s) Oral two times a day  multivitamin/minerals/iron Oral Solution (CENTRUM) 15 milliLiter(s) Oral daily  pantoprazole   Suspension 40 milliGRAM(s) Oral daily  psyllium Powder 1 Packet(s) Oral two times a day  sodium bicarbonate 650 milliGRAM(s) Oral once  tobramycin for Nebulization 300 milliGRAM(s) Inhalation every 12 hours  Viokace 10,440 USP units 1 Tablet(s) 1 Tablet(s) Oral once  zinc sulfate 220 milliGRAM(s) Oral daily    MEDICATIONS  (PRN):  acetaminophen    Suspension .. 650 milliGRAM(s) Oral every 6 hours PRN Temp greater or equal to 38C (100.4F), Mild Pain (1 - 3), Moderate Pain (4 - 6)  artificial tears (preservative free) Ophthalmic Solution 1 Drop(s) Both EYES every 8 hours PRN Dry Eyes  oxyCODONE    Solution 5 milliGRAM(s) Oral every 4 hours PRN Severe Pain (7 - 10)      LABS:                        9.2    9.97  )-----------( 443      ( 31 Jan 2021 05:46 )             31.4     01-31    137  |  99  |  6<L>  ----------------------------<  124<H>  3.3<L>   |  30  |  0.24<L>    Ca    9.3      31 Jan 2021 05:46  Phos  3.7     01-31  Mg     1.7     01-31                MICROBIOLOGY:     RADIOLOGY:  [ ] Reviewed and interpreted by me    PULMONARY FUNCTION TESTS:    EKG: CHIEF COMPLAINT Patient is a 60y old  Female who presents with a chief complaint of Transfer from [facility] (31 Jan 2021 07:44)      Interval Events: Pt seen and evaluated with team.  Improved cough/secretions     REVIEW OF SYSTEMS:  Constitutional: no fever /chills   CV: Denies   Resp: Denies   GI: + diarrhea   [x ] All other systems negative      OBJECTIVE:  ICU Vital Signs Last 24 Hrs  T(C): 36.4 (31 Jan 2021 05:55), Max: 37 (30 Jan 2021 14:55)  T(F): 97.6 (31 Jan 2021 05:55), Max: 98.6 (30 Jan 2021 14:55)  HR: 89 (31 Jan 2021 05:55) (81 - 110)  BP: 154/71 (31 Jan 2021 05:55) (149/71 - 158/73)  BP(mean): --  ABP: --  ABP(mean): --  RR: 20 (31 Jan 2021 05:55) (20 - 24)  SpO2: 99% (31 Jan 2021 05:55) (96% - 100%)    Mode: AC/ CMV (Assist Control/ Continuous Mandatory Ventilation), RR (machine): 12, TV (machine): 450, FiO2: 30, PEEP: 5, MAP: 13, PIP: 31    01-30 @ 07:01  -  01-31 @ 07:00  --------------------------------------------------------  IN: 568 mL / OUT: 550 mL / NET: 18 mL      CAPILLARY BLOOD GLUCOSE      POCT Blood Glucose.: 116 mg/dL (31 Jan 2021 05:19)      HOSPITAL MEDICATIONS:  MEDICATIONS  (STANDING):  ALBUTerol    90 MICROgram(s) HFA Inhaler 2 Puff(s) Inhalation every 6 hours  amLODIPine   Tablet 5 milliGRAM(s) Oral daily  ascorbic acid 500 milliGRAM(s) Oral daily  cefTAZidime  IVPB 2 Gram(s) IV Intermittent every 8 hours  chlorhexidine 4% Liquid 1 Application(s) Topical daily  collagenase Ointment 1 Application(s) Topical two times a day  enoxaparin Injectable 100 milliGRAM(s) SubCutaneous every 12 hours  insulin lispro (ADMELOG) corrective regimen sliding scale   SubCutaneous every 6 hours  lactobacillus acidophilus 1 Tablet(s) Oral two times a day  metoprolol tartrate 50 milliGRAM(s) Oral two times a day  multivitamin/minerals/iron Oral Solution (CENTRUM) 15 milliLiter(s) Oral daily  pantoprazole   Suspension 40 milliGRAM(s) Oral daily  psyllium Powder 1 Packet(s) Oral two times a day  sodium bicarbonate 650 milliGRAM(s) Oral once  tobramycin for Nebulization 300 milliGRAM(s) Inhalation every 12 hours  Viokace 10,440 USP units 1 Tablet(s) 1 Tablet(s) Oral once  zinc sulfate 220 milliGRAM(s) Oral daily    MEDICATIONS  (PRN):  acetaminophen    Suspension .. 650 milliGRAM(s) Oral every 6 hours PRN Temp greater or equal to 38C (100.4F), Mild Pain (1 - 3), Moderate Pain (4 - 6)  artificial tears (preservative free) Ophthalmic Solution 1 Drop(s) Both EYES every 8 hours PRN Dry Eyes  oxyCODONE    Solution 5 milliGRAM(s) Oral every 4 hours PRN Severe Pain (7 - 10)      LABS:                        9.2    9.97  )-----------( 443      ( 31 Jan 2021 05:46 )             31.4     01-31    137  |  99  |  6<L>  ----------------------------<  124<H>  3.3<L>   |  30  |  0.24<L>    Ca    9.3      31 Jan 2021 05:46  Phos  3.7     01-31  Mg     1.7     01-31                MICROBIOLOGY:     RADIOLOGY:  [ ] Reviewed and interpreted by me    PULMONARY FUNCTION TESTS:    EKG:

## 2021-01-31 NOTE — PROGRESS NOTE ADULT - ASSESSMENT
59 YO F with PMHx of HTN, DVT on Xarelto, Peritonitis s/p Oral's Procedure and Ileostomy (reversed in 2011) and AARON who presented to [facility] on 11/18 for AHRF second to COVID 19, intubated on 11/23 complicated by lung abscesses on CT CHEST 12/5, multi-bacterial PNA and bacteremia, ARTEMIO s/p CRRT and HD, and s/p Tracheostomy 12/18. Transferred to [facility] for further management.     # AMS   - AOX4 at baseline.   - CTH and EEG WNL   - Now weaned off all sedation.   - Monitor mentation and supportive care   - Alert/follows commands     # ARDS second to COVID vs Multi-bacterial PNA/ R Lung Abscess  - Intubated 11/23 and course complicated with multi-bacterial PNA and lung abscesses.   - Lung abscess presented on CT CHEST 12/5  - s/p Tracheostomy on 12/18 by CTSx. Sutures out 1/1/2021  - Air leak noted and s/p Trach Exchange for Bivona with CTSx 12/31  - Continue on AC vent and PS as tolerated. Tolerated PS for >5 hr today 1/27  - Proventil, Chest PT and Suction PRN. Trach Care QD.   - Loose cough /secretions Fortaz/inhaled tobra   - COughing secretions improved - some bld tinged secretions   - Weaning in progress     # Vasoplegic vs Septic Shock   - Weaned off pressors. Monitor BP on Norvasc and Metoprolol.   - On full AC for Hx of DVT. CTA CHEST with no PEs.     # ARTEMIO s/p CRRT and HD   - ARTEMIO now resolved and no longer required HD.   - Monitor renal function and avoid nephrotoxins.     # Dysphagia   - Initially CTSx and GI deferred PEG given Ventral Hernia   - s/p PEG placement with IR 1/11   - Nauseous episodes noted and s/p Reglan PRN. Continue on TF and monitor tolerance.   - No new sx /tolerating feeds     # Sepsis second to COVID vs Multi-bacterial PNA/ Bacteremia and R Lung Abscess   - COVID with superimposed multi-bacterial VAP vs aspiration PNA vs cavitary PNA.   - SCx (11/24) MSSA and BCx (11/27) with MSSA and Proteus Bacteremia  - SCx (12/1, 12/12 and 12/27) with Stenotrophomonas.   - SCx (1/9) with  Pseudomonas and Stenotrophomonas   - BCX has been persistently negative from 12/1, and most recently 1/12   - CT CHEST 12/4 with right lung cavitation.   - s/p multiple antibiotics, but now on Fortaz and Flagyl (1/12-1/16)   - Monitor off antibiotics.   - No longer febrile and WBC downtrending. Blood cx NTD. Sputum growing gram neg but known hx of pseudo  tracheitis.  - CT chest ordered as has significant sputum production with cavitation on previous ct Abbesses improved   - Seen by ID Fortaz/inhaled tobra started for pseudomonas     # Diarrhea   - metamucil bid   - TF changed to Peptimen  - Stool for c-diff   -flexiseal     # DM2   - Continue on ISS and NPH and monitor FS.   - Adjust insulin as needed.     # Hx of DVT   - Continue on Lovenox FULL DOSE for Xarelto for now.     # Skin/ Facial Wounds   - WOC recommendations appreciated   - Plastics evaluation and recommendations of facial wound appreciated     # DVT/ GI PPX with FULL AC/ PPI   # CODE STATUS - FULL CODE   # DISPO - Rehab likely Vent facility.    59 YO F with PMHx of HTN, DVT on Xarelto, Peritonitis s/p Oral's Procedure and Ileostomy (reversed in 2011) and AARON who presented to [facility] on 11/18 for AHRF second to COVID 19, intubated on 11/23 complicated by lung abscesses on CT CHEST 12/5, multi-bacterial PNA and bacteremia, ARTEMIO s/p CRRT and HD, and s/p Tracheostomy 12/18. Transferred to [facility] for further management.     # AMS   - AOX4 at baseline.   - CTH and EEG WNL   - Now weaned off all sedation.   - Monitor mentation and supportive care   - Alert/follows commands     # ARDS second to COVID vs Multi-bacterial PNA/ R Lung Abscess  - Intubated 11/23 and course complicated with multi-bacterial PNA and lung abscesses.   - Lung abscess presented on CT CHEST 12/5  - s/p Tracheostomy on 12/18 by CTSx. Sutures out 1/1/2021  - Air leak noted and s/p Trach Exchange for Bivona with CTSx 12/31  - Continue on AC vent and PS as tolerated. Tolerated PS for >5 hr today 1/27  - Proventil, Chest PT and Suction PRN. Trach Care QD.   - Loose cough /secretions Fortaz/inhaled tobra   - COughing secretions improved - some bld tinged secretions   - Weaning in progress   - Psuedomonas  fortaz/taylor     # Vasoplegic vs Septic Shock   - Weaned off pressors. Monitor BP on Norvasc and Metoprolol.   - On full AC for Hx of DVT. CTA CHEST with no PEs.     # ARTEMIO s/p CRRT and HD   - ARTEMIO now resolved and no longer required HD.   - Monitor renal function and avoid nephrotoxins.     # Dysphagia   - Initially CTSx and GI deferred PEG given Ventral Hernia   - s/p PEG placement with IR 1/11   - Nauseous episodes noted and s/p Reglan PRN. Continue on TF and monitor tolerance.   - No new sx /tolerating feeds     # Sepsis second to COVID vs Multi-bacterial PNA/ Bacteremia and R Lung Abscess   - COVID with superimposed multi-bacterial VAP vs aspiration PNA vs cavitary PNA.   - SCx (11/24) MSSA and BCx (11/27) with MSSA and Proteus Bacteremia  - SCx (12/1, 12/12 and 12/27) with Stenotrophomonas.   - SCx (1/9) with  Pseudomonas and Stenotrophomonas   - BCX has been persistently negative from 12/1, and most recently 1/12   - CT CHEST 12/4 with right lung cavitation.   - s/p multiple antibiotics, but now on Fortaz and Flagyl (1/12-1/16)   - Monitor off antibiotics.   - No longer febrile and WBC downtrending. Blood cx NTD. Sputum growing gram neg but known hx of pseudo  tracheitis.  - CT chest ordered as has significant sputum production with cavitation on previous ct Abbesses improved   - Seen by ID Fortaz/inhaled tobra started for pseudomonas     # Diarrhea   - metamucil bid   - TF changed to Peptimen  - Stool for c-diff -not sent and reordered   -flexiseal     # DM2   - Continue on ISS and NPH and monitor FS.   - Adjust insulin as needed.     # Hx of DVT   - Continue on Lovenox FULL DOSE for Xarelto for now.     # Skin/ Facial Wounds   - WOC recommendations appreciated   - Plastics evaluation and recommendations of facial wound appreciated     # DVT/ GI PPX with FULL AC/ PPI   # CODE STATUS - FULL CODE   # DISPO - Rehab likely Vent facility.

## 2021-02-01 LAB
ANION GAP SERPL CALC-SCNC: 10 MMOL/L — SIGNIFICANT CHANGE UP (ref 7–14)
BUN SERPL-MCNC: 5 MG/DL — LOW (ref 7–23)
C DIFF BY PCR RESULT: DETECTED
C DIFF TOX GENS STL QL NAA+PROBE: SIGNIFICANT CHANGE UP
CALCIUM SERPL-MCNC: 9.1 MG/DL — SIGNIFICANT CHANGE UP (ref 8.4–10.5)
CHLORIDE SERPL-SCNC: 99 MMOL/L — SIGNIFICANT CHANGE UP (ref 98–107)
CO2 SERPL-SCNC: 28 MMOL/L — SIGNIFICANT CHANGE UP (ref 22–31)
CREAT SERPL-MCNC: 0.23 MG/DL — LOW (ref 0.5–1.3)
GLUCOSE BLDC GLUCOMTR-MCNC: 122 MG/DL — HIGH (ref 70–99)
GLUCOSE BLDC GLUCOMTR-MCNC: 133 MG/DL — HIGH (ref 70–99)
GLUCOSE BLDC GLUCOMTR-MCNC: 140 MG/DL — HIGH (ref 70–99)
GLUCOSE BLDC GLUCOMTR-MCNC: 151 MG/DL — HIGH (ref 70–99)
GLUCOSE BLDC GLUCOMTR-MCNC: 169 MG/DL — HIGH (ref 70–99)
GLUCOSE SERPL-MCNC: 137 MG/DL — HIGH (ref 70–99)
HCT VFR BLD CALC: 32.7 % — LOW (ref 34.5–45)
HGB BLD-MCNC: 9.5 G/DL — LOW (ref 11.5–15.5)
MAGNESIUM SERPL-MCNC: 1.5 MG/DL — LOW (ref 1.6–2.6)
MCHC RBC-ENTMCNC: 28 PG — SIGNIFICANT CHANGE UP (ref 27–34)
MCHC RBC-ENTMCNC: 29.1 GM/DL — LOW (ref 32–36)
MCV RBC AUTO: 96.5 FL — SIGNIFICANT CHANGE UP (ref 80–100)
NRBC # BLD: 0 /100 WBCS — SIGNIFICANT CHANGE UP
NRBC # FLD: 0 K/UL — SIGNIFICANT CHANGE UP
PHOSPHATE SERPL-MCNC: 3.1 MG/DL — SIGNIFICANT CHANGE UP (ref 2.5–4.5)
PLATELET # BLD AUTO: 493 K/UL — HIGH (ref 150–400)
POTASSIUM SERPL-MCNC: 3.4 MMOL/L — LOW (ref 3.5–5.3)
POTASSIUM SERPL-SCNC: 3.4 MMOL/L — LOW (ref 3.5–5.3)
RBC # BLD: 3.39 M/UL — LOW (ref 3.8–5.2)
RBC # FLD: 17.2 % — HIGH (ref 10.3–14.5)
SODIUM SERPL-SCNC: 137 MMOL/L — SIGNIFICANT CHANGE UP (ref 135–145)
WBC # BLD: 9.35 K/UL — SIGNIFICANT CHANGE UP (ref 3.8–10.5)
WBC # FLD AUTO: 9.35 K/UL — SIGNIFICANT CHANGE UP (ref 3.8–10.5)

## 2021-02-01 PROCEDURE — 99233 SBSQ HOSP IP/OBS HIGH 50: CPT

## 2021-02-01 RX ORDER — POTASSIUM CHLORIDE 20 MEQ
40 PACKET (EA) ORAL ONCE
Refills: 0 | Status: COMPLETED | OUTPATIENT
Start: 2021-02-01 | End: 2021-02-01

## 2021-02-01 RX ORDER — MAGNESIUM SULFATE 500 MG/ML
2 VIAL (ML) INJECTION ONCE
Refills: 0 | Status: COMPLETED | OUTPATIENT
Start: 2021-02-01 | End: 2021-02-01

## 2021-02-01 RX ORDER — VANCOMYCIN HCL 1 G
125 VIAL (EA) INTRAVENOUS EVERY 6 HOURS
Refills: 0 | Status: DISCONTINUED | OUTPATIENT
Start: 2021-02-01 | End: 2021-02-10

## 2021-02-01 RX ADMIN — ALBUTEROL 2 PUFF(S): 90 AEROSOL, METERED ORAL at 12:24

## 2021-02-01 RX ADMIN — Medication 50 MILLIGRAM(S): at 05:32

## 2021-02-01 RX ADMIN — Medication 15 MILLILITER(S): at 12:25

## 2021-02-01 RX ADMIN — Medication 2: at 06:55

## 2021-02-01 RX ADMIN — PANTOPRAZOLE SODIUM 40 MILLIGRAM(S): 20 TABLET, DELAYED RELEASE ORAL at 12:25

## 2021-02-01 RX ADMIN — OXYCODONE HYDROCHLORIDE 5 MILLIGRAM(S): 5 TABLET ORAL at 21:56

## 2021-02-01 RX ADMIN — OXYCODONE HYDROCHLORIDE 5 MILLIGRAM(S): 5 TABLET ORAL at 00:47

## 2021-02-01 RX ADMIN — ENOXAPARIN SODIUM 100 MILLIGRAM(S): 100 INJECTION SUBCUTANEOUS at 05:32

## 2021-02-01 RX ADMIN — Medication 1 PACKET(S): at 05:32

## 2021-02-01 RX ADMIN — Medication 1 APPLICATION(S): at 17:43

## 2021-02-01 RX ADMIN — Medication 1 TABLET(S): at 17:44

## 2021-02-01 RX ADMIN — Medication 125 MILLIGRAM(S): at 13:25

## 2021-02-01 RX ADMIN — Medication 300 MILLIGRAM(S): at 12:33

## 2021-02-01 RX ADMIN — ALBUTEROL 2 PUFF(S): 90 AEROSOL, METERED ORAL at 21:09

## 2021-02-01 RX ADMIN — OXYCODONE HYDROCHLORIDE 5 MILLIGRAM(S): 5 TABLET ORAL at 05:31

## 2021-02-01 RX ADMIN — Medication 1 DROP(S): at 06:55

## 2021-02-01 RX ADMIN — Medication 50 GRAM(S): at 17:43

## 2021-02-01 RX ADMIN — CEFTAZIDIME 100 GRAM(S): 6 INJECTION, POWDER, FOR SOLUTION INTRAVENOUS at 21:45

## 2021-02-01 RX ADMIN — Medication 1 APPLICATION(S): at 05:33

## 2021-02-01 RX ADMIN — ALBUTEROL 2 PUFF(S): 90 AEROSOL, METERED ORAL at 04:29

## 2021-02-01 RX ADMIN — Medication 50 MILLIGRAM(S): at 17:44

## 2021-02-01 RX ADMIN — ENOXAPARIN SODIUM 100 MILLIGRAM(S): 100 INJECTION SUBCUTANEOUS at 17:43

## 2021-02-01 RX ADMIN — Medication 500 MILLIGRAM(S): at 12:25

## 2021-02-01 RX ADMIN — Medication 40 MILLIEQUIVALENT(S): at 17:43

## 2021-02-01 RX ADMIN — CHLORHEXIDINE GLUCONATE 1 APPLICATION(S): 213 SOLUTION TOPICAL at 12:26

## 2021-02-01 RX ADMIN — Medication 1 TABLET(S): at 05:32

## 2021-02-01 RX ADMIN — Medication 2: at 12:25

## 2021-02-01 RX ADMIN — Medication 125 MILLIGRAM(S): at 17:44

## 2021-02-01 RX ADMIN — CEFTAZIDIME 100 GRAM(S): 6 INJECTION, POWDER, FOR SOLUTION INTRAVENOUS at 12:24

## 2021-02-01 RX ADMIN — ZINC SULFATE TAB 220 MG (50 MG ZINC EQUIVALENT) 220 MILLIGRAM(S): 220 (50 ZN) TAB at 12:25

## 2021-02-01 RX ADMIN — CEFTAZIDIME 100 GRAM(S): 6 INJECTION, POWDER, FOR SOLUTION INTRAVENOUS at 05:31

## 2021-02-01 RX ADMIN — AMLODIPINE BESYLATE 5 MILLIGRAM(S): 2.5 TABLET ORAL at 05:33

## 2021-02-01 RX ADMIN — Medication 125 MILLIGRAM(S): at 23:18

## 2021-02-01 NOTE — PROGRESS NOTE ADULT - ASSESSMENT
61 YO F with PMHx of HTN, DVT on Xarelto, Peritonitis s/p Oral's Procedure and Ileostomy (reversed in 2011) and AARON who presented to [facility] on 11/18 for AHRF second to COVID 19, intubated on 11/23 complicated by R lung abscesses on CT CHEST 12/5, multibacterial PNA and bacteremia, ARTEMIO s/p CRRT and HD, and s/p Tracheostomy 12/18 c/b track leak and s/p OR 8Bivona. Transferred to [facility] for furhter management.     # AMS   - AOX3 at baseline.   - CTH and EEG WNL   - Now weaned off all sedation.   - Monitor mentation and supportive care     # ARDS second to COVID vs Multi-bacterial PNA/ R Lung Abscess  - Intubated 11/23 and course complicated with multi-bacterial PNA and lung abscesses.   - Lung abscess presented on CT CHEST 12/5  - s/p Tracheostomy on 12/18 by CTSx. Sutures out 1/1/2021  - Air leak noted and s/p Trach Exchange for Bivona with CTSx 12/31  - Currently on FULL Vent. PS as tolerated.   - Proventil, Chest PT and Suction PRN. Trach Care QD.     # Vasoplegic vs Septic Shock   - Weaned off pressors. Monitor BP on Norvasc and Metoprolol.   - On full AC for Hx of DVT. CTA CHEST with no PEs.     # ARTEMIO s/p CRRT and HD   - ARTEMIO now resolved and no longer required HD.   - Monitor renal function and avoid nephrotoxins.     # Dysphagia   - Initially CTSx and GI deferred PEG given Ventral Hernia   - s/p PEG placement with IR 1/11   - Continue on TF and monitor tolerance.   - Monitor diarrhea as CDIFF (+). Hold laxatives.     # Sepsis second to COVID vs Multi-bacterial PNA/ Bacteremia and R Lung Abscess vs CDIFF (+)   - COVID with superimposed multi-bacterial VAP vs aspiration PNA vs cavitary PNA.   - SCx (11/24) MSSA and BCx (11/27) with MSSA and Proteus Bacteremia  - SCx (12/1, 12/12 and 12/27) with Stenotrophomonas.   - SCx (1/9) with  Pseudomonas and Stenotrophomonas   - BCX has been persistently negative from 12/1, and most recently 1/12   - CT CHEST 12/4 with right lung cavitation.   - s/p multiple antibiotics, but now completed Fortaz and Flagyl (1/12-1/16)  - RPT CT CHEST 1/28 with improvement of right lung abscess   - SCx with  Pseudomonas and Stentrophomonas noted and started on dual pseudomonal coverage (1/28-2/3)    - CDIFF (+) and started on PO Vancomycin (2/1 - )     # DM2   - Continue on ISS and NPH and monitor FS.   - Adjust insulin as needed.     # Hx of DVT   - Continue on Lovenox FULL DOSE for Xarelto for now.     # Skin/ Facial Wounds   - WOC recommendations appreciated   - Plastics evaluation and recommendations of facial wound appreciated     # DVT/ GI PPX with FULL AC/ PPI   # CODE STATUS - FULL CODE   # DISPO - PT recc Rehab/ Vent Facility

## 2021-02-01 NOTE — PROVIDER CONTACT NOTE (CRITICAL VALUE NOTIFICATION) - SITUATION
CBC clotted.
Potassium 8.12
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
Pt potassium 2.8

## 2021-02-01 NOTE — PROVIDER CONTACT NOTE (CRITICAL VALUE NOTIFICATION) - TEST AND RESULT REPORTED:
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
CBC clotted.
Pt potassium 2.8

## 2021-02-01 NOTE — PROVIDER CONTACT NOTE (CRITICAL VALUE NOTIFICATION) - ACTION/TREATMENT ORDERED:
Already treating with antibiotics
Provider is notified. Provider reordered the labs. Continue to monitor.
Provider Contact
Provider aware. Supplementation to be ordered.

## 2021-02-01 NOTE — PROGRESS NOTE ADULT - SUBJECTIVE AND OBJECTIVE BOX
CHIEF COMPLAINT:    SUBJECTIVE:     [ ] All other systems negative  [ ] Unable to assess ROS because ________    OBJECTIVE:  ICU Vital Signs Last 24 Hrs  T(C): 37.1 (01 Feb 2021 05:29), Max: 37.1 (31 Jan 2021 21:48)  T(F): 98.8 (01 Feb 2021 05:29), Max: 98.8 (31 Jan 2021 21:48)  HR: 81 (01 Feb 2021 07:27) (80 - 99)  BP: 148/68 (01 Feb 2021 05:29) (133/72 - 148/68)  BP(mean): --  ABP: --  ABP(mean): --  RR: 21 (01 Feb 2021 05:29) (20 - 28)  SpO2: 99% (01 Feb 2021 07:27) (95% - 100%)    Mode: AC/ CMV (Assist Control/ Continuous Mandatory Ventilation), RR (machine): 12, TV (machine): 450, FiO2: 30, PEEP: 5, MAP: 13, PIP: 33    01-31 @ 07:01  -  02-01 @ 07:00  --------------------------------------------------------  IN: 2759 mL / OUT: 500 mL / NET: 2259 mL      CAPILLARY BLOOD GLUCOSE      POCT Blood Glucose.: 169 mg/dL (01 Feb 2021 06:51)      PHYSICAL EXAM:  General:   HEENT:   Lymph Nodes:  Neck:   Respiratory:   Cardiovascular:   Abdomen:   Extremities:   Skin:   Neurological:  Psychiatry:    HOSPITAL MEDICATIONS:  MEDICATIONS  (STANDING):  ALBUTerol    90 MICROgram(s) HFA Inhaler 2 Puff(s) Inhalation every 6 hours  amLODIPine   Tablet 5 milliGRAM(s) Oral daily  ascorbic acid 500 milliGRAM(s) Oral daily  cefTAZidime  IVPB 2 Gram(s) IV Intermittent every 8 hours  chlorhexidine 4% Liquid 1 Application(s) Topical daily  collagenase Ointment 1 Application(s) Topical two times a day  enoxaparin Injectable 100 milliGRAM(s) SubCutaneous every 12 hours  insulin lispro (ADMELOG) corrective regimen sliding scale   SubCutaneous every 6 hours  lactobacillus acidophilus 1 Tablet(s) Oral two times a day  metoprolol tartrate 50 milliGRAM(s) Oral two times a day  multivitamin/minerals/iron Oral Solution (CENTRUM) 15 milliLiter(s) Oral daily  pantoprazole   Suspension 40 milliGRAM(s) Oral daily  tobramycin for Nebulization 300 milliGRAM(s) Inhalation every 12 hours  vancomycin    Solution 125 milliGRAM(s) Oral every 6 hours  zinc sulfate 220 milliGRAM(s) Oral daily    MEDICATIONS  (PRN):  acetaminophen    Suspension .. 650 milliGRAM(s) Oral every 6 hours PRN Temp greater or equal to 38C (100.4F), Mild Pain (1 - 3), Moderate Pain (4 - 6)  artificial tears (preservative free) Ophthalmic Solution 1 Drop(s) Both EYES every 8 hours PRN Dry Eyes  oxyCODONE    Solution 5 milliGRAM(s) Oral every 4 hours PRN Severe Pain (7 - 10)      LABS:                        9.2    9.97  )-----------( 443      ( 31 Jan 2021 05:46 )             31.4     01-31    137  |  99  |  6<L>  ----------------------------<  124<H>  3.3<L>   |  30  |  0.24<L>    Ca    9.3      31 Jan 2021 05:46  Phos  3.7     01-31  Mg     1.7     01-31             CHIEF COMPLAINT: Patient is a 60y old  Female who presents with a chief complaint of Transfer from [facility] (02 Feb 2021 08:37)    SUBJECTIVE: No interval events overnight. Seen by bedside during AM rounds and unable to assess complete ROS given poor phonation with vent.     OBJECTIVE:  ICU Vital Signs Last 24 Hrs  T(C): 37.1 (01 Feb 2021 05:29), Max: 37.1 (31 Jan 2021 21:48)  T(F): 98.8 (01 Feb 2021 05:29), Max: 98.8 (31 Jan 2021 21:48)  HR: 81 (01 Feb 2021 07:27) (80 - 99)  BP: 148/68 (01 Feb 2021 05:29) (133/72 - 148/68)  BP(mean): --  ABP: --  ABP(mean): --  RR: 21 (01 Feb 2021 05:29) (20 - 28)  SpO2: 99% (01 Feb 2021 07:27) (95% - 100%)    Mode: AC/ CMV (Assist Control/ Continuous Mandatory Ventilation), RR (machine): 12, TV (machine): 450, FiO2: 30, PEEP: 5, MAP: 13, PIP: 33    01-31 @ 07:01  -  02-01 @ 07:00  --------------------------------------------------------  IN: 2759 mL / OUT: 500 mL / NET: 2259 mL    CAPILLARY BLOOD GLUCOSE  POCT Blood Glucose.: 169 mg/dL (01 Feb 2021 06:51)    PHYSICAL EXAM:  General: NAD, well groomed. (+) Obese.   HEENT: NC/ AT, PERRLA, Tracheostomy clean, dry and intact.   Cardio: RRR, S1/S2, no murmurs or rubs.   Pulm: Coarse vent sounds noted throughout, equal bilaterally.   GI: Soft, NDNT, BS (+). PEG (+)   MS: No pedal edema. AROMI.   Neuro: Awake and alert. Follow commands. No focal neurological deficits noted.   Skin: Warm and dry. No jaundice or cyanosis     HOSPITAL MEDICATIONS:  MEDICATIONS  (STANDING):  ALBUTerol    90 MICROgram(s) HFA Inhaler 2 Puff(s) Inhalation every 6 hours  amLODIPine   Tablet 5 milliGRAM(s) Oral daily  ascorbic acid 500 milliGRAM(s) Oral daily  cefTAZidime  IVPB 2 Gram(s) IV Intermittent every 8 hours  chlorhexidine 4% Liquid 1 Application(s) Topical daily  collagenase Ointment 1 Application(s) Topical two times a day  enoxaparin Injectable 100 milliGRAM(s) SubCutaneous every 12 hours  insulin lispro (ADMELOG) corrective regimen sliding scale   SubCutaneous every 6 hours  lactobacillus acidophilus 1 Tablet(s) Oral two times a day  metoprolol tartrate 50 milliGRAM(s) Oral two times a day  multivitamin/minerals/iron Oral Solution (CENTRUM) 15 milliLiter(s) Oral daily  pantoprazole   Suspension 40 milliGRAM(s) Oral daily  tobramycin for Nebulization 300 milliGRAM(s) Inhalation every 12 hours  vancomycin    Solution 125 milliGRAM(s) Oral every 6 hours  zinc sulfate 220 milliGRAM(s) Oral daily    MEDICATIONS  (PRN):  acetaminophen    Suspension .. 650 milliGRAM(s) Oral every 6 hours PRN Temp greater or equal to 38C (100.4F), Mild Pain (1 - 3), Moderate Pain (4 - 6)  artificial tears (preservative free) Ophthalmic Solution 1 Drop(s) Both EYES every 8 hours PRN Dry Eyes  oxyCODONE    Solution 5 milliGRAM(s) Oral every 4 hours PRN Severe Pain (7 - 10)    LABS:                        9.2    9.97  )-----------( 443      ( 31 Jan 2021 05:46 )             31.4     01-31    137  |  99  |  6<L>  ----------------------------<  124<H>  3.3<L>   |  30  |  0.24<L>    Ca    9.3      31 Jan 2021 05:46  Phos  3.7     01-31  Mg     1.7     01-31

## 2021-02-01 NOTE — PROGRESS NOTE ADULT - ATTENDING COMMENTS
60F a/w COVID 19, acute hypoxemic respiratory failure with ARDS c/b superimposed MSSA cavitary PNA and ARF requiring HD. Pt with failure to wean from mechanical ventilation, s/p tracheostomy placement 12/18 and transfer to [facility] 12/28.     1. Neuro - encephalopathy much improved, very deconditioned    2. Pulm - acute respiratory failure - trach placement by CTSx, 12/18 Proximal 7XLT and then changed to 8Fr Bivona 12/31 for persistent air leak  c/w daily SBTs- tolerating PS 5/5, will attempt trach collar.  c/w airway clearance therapy and trach care    3. ID - MSSA bacteremia with cavitary PNA s/p completion of Primaxin on 12/28 and Ceftazidime through 1/5 for Stenotrophomonas PNA.   Recurrence of fevers and (+) sputum cx for Stenotrophomonas and Pseudomonas 1/12 s/p completion of Flagyl+Ceftazidime+Vancomycin  Pt with low grade fevers, repeat cx again (+) for CRE Pseudomonas and Stenotrophomonas, initiated eradication therapy with IV+Nebulized Abx 1/28 given copious secretions and recurrent fevers. Repeat CT Chest shows significantly improved cavitary lesions from prior MSSA PNA without area requiring further source control. Now with C Diff. Start PO vancomycin.    4. GI -oropharyngeal dysphagia, IR guided PEG successfully placed 1/11. Pt tolerating PEG feeds.     5. Renal - ARF resolved, monitor urine output    6. Endo - DM - NPH+HISS for glucose control.     7. Derm - Wound consult and plastic sx recs appreciated.    8. PPX - pud and DVT     Dispo- ROJELIO when medically optimized .     I was physically present for the key portions of the evaluation and management (E/M) service provided.  I agree with the above history, physical, and plan which I have reviewed and edited where appropriate.

## 2021-02-02 LAB
ANION GAP SERPL CALC-SCNC: 11 MMOL/L — SIGNIFICANT CHANGE UP (ref 7–14)
BUN SERPL-MCNC: 6 MG/DL — LOW (ref 7–23)
CALCIUM SERPL-MCNC: 8.8 MG/DL — SIGNIFICANT CHANGE UP (ref 8.4–10.5)
CHLORIDE SERPL-SCNC: 101 MMOL/L — SIGNIFICANT CHANGE UP (ref 98–107)
CO2 SERPL-SCNC: 26 MMOL/L — SIGNIFICANT CHANGE UP (ref 22–31)
CREAT SERPL-MCNC: 0.23 MG/DL — LOW (ref 0.5–1.3)
GLUCOSE BLDC GLUCOMTR-MCNC: 106 MG/DL — HIGH (ref 70–99)
GLUCOSE BLDC GLUCOMTR-MCNC: 124 MG/DL — HIGH (ref 70–99)
GLUCOSE BLDC GLUCOMTR-MCNC: 129 MG/DL — HIGH (ref 70–99)
GLUCOSE BLDC GLUCOMTR-MCNC: 136 MG/DL — HIGH (ref 70–99)
GLUCOSE SERPL-MCNC: 129 MG/DL — HIGH (ref 70–99)
HCT VFR BLD CALC: 31.3 % — LOW (ref 34.5–45)
HGB BLD-MCNC: 9 G/DL — LOW (ref 11.5–15.5)
MAGNESIUM SERPL-MCNC: 1.8 MG/DL — SIGNIFICANT CHANGE UP (ref 1.6–2.6)
MCHC RBC-ENTMCNC: 27.9 PG — SIGNIFICANT CHANGE UP (ref 27–34)
MCHC RBC-ENTMCNC: 28.8 GM/DL — LOW (ref 32–36)
MCV RBC AUTO: 96.9 FL — SIGNIFICANT CHANGE UP (ref 80–100)
NRBC # BLD: 0 /100 WBCS — SIGNIFICANT CHANGE UP
NRBC # FLD: 0 K/UL — SIGNIFICANT CHANGE UP
PHOSPHATE SERPL-MCNC: 2.8 MG/DL — SIGNIFICANT CHANGE UP (ref 2.5–4.5)
PLATELET # BLD AUTO: 485 K/UL — HIGH (ref 150–400)
POTASSIUM SERPL-MCNC: 4.1 MMOL/L — SIGNIFICANT CHANGE UP (ref 3.5–5.3)
POTASSIUM SERPL-SCNC: 4.1 MMOL/L — SIGNIFICANT CHANGE UP (ref 3.5–5.3)
RBC # BLD: 3.23 M/UL — LOW (ref 3.8–5.2)
RBC # FLD: 17.2 % — HIGH (ref 10.3–14.5)
SODIUM SERPL-SCNC: 138 MMOL/L — SIGNIFICANT CHANGE UP (ref 135–145)
WBC # BLD: 7.16 K/UL — SIGNIFICANT CHANGE UP (ref 3.8–10.5)
WBC # FLD AUTO: 7.16 K/UL — SIGNIFICANT CHANGE UP (ref 3.8–10.5)

## 2021-02-02 PROCEDURE — 93010 ELECTROCARDIOGRAM REPORT: CPT

## 2021-02-02 PROCEDURE — 99233 SBSQ HOSP IP/OBS HIGH 50: CPT

## 2021-02-02 RX ADMIN — Medication 1 TABLET(S): at 06:07

## 2021-02-02 RX ADMIN — ENOXAPARIN SODIUM 100 MILLIGRAM(S): 100 INJECTION SUBCUTANEOUS at 18:15

## 2021-02-02 RX ADMIN — ALBUTEROL 2 PUFF(S): 90 AEROSOL, METERED ORAL at 16:20

## 2021-02-02 RX ADMIN — Medication 300 MILLIGRAM(S): at 21:58

## 2021-02-02 RX ADMIN — Medication 125 MILLIGRAM(S): at 18:15

## 2021-02-02 RX ADMIN — PANTOPRAZOLE SODIUM 40 MILLIGRAM(S): 20 TABLET, DELAYED RELEASE ORAL at 13:16

## 2021-02-02 RX ADMIN — Medication 15 MILLILITER(S): at 13:16

## 2021-02-02 RX ADMIN — CEFTAZIDIME 100 GRAM(S): 6 INJECTION, POWDER, FOR SOLUTION INTRAVENOUS at 20:43

## 2021-02-02 RX ADMIN — CEFTAZIDIME 100 GRAM(S): 6 INJECTION, POWDER, FOR SOLUTION INTRAVENOUS at 07:40

## 2021-02-02 RX ADMIN — Medication 500 MILLIGRAM(S): at 13:16

## 2021-02-02 RX ADMIN — Medication 125 MILLIGRAM(S): at 13:15

## 2021-02-02 RX ADMIN — ENOXAPARIN SODIUM 100 MILLIGRAM(S): 100 INJECTION SUBCUTANEOUS at 06:10

## 2021-02-02 RX ADMIN — Medication 50 MILLIGRAM(S): at 18:16

## 2021-02-02 RX ADMIN — ALBUTEROL 2 PUFF(S): 90 AEROSOL, METERED ORAL at 03:54

## 2021-02-02 RX ADMIN — Medication 300 MILLIGRAM(S): at 11:34

## 2021-02-02 RX ADMIN — Medication 125 MILLIGRAM(S): at 06:09

## 2021-02-02 RX ADMIN — ZINC SULFATE TAB 220 MG (50 MG ZINC EQUIVALENT) 220 MILLIGRAM(S): 220 (50 ZN) TAB at 13:16

## 2021-02-02 RX ADMIN — ALBUTEROL 2 PUFF(S): 90 AEROSOL, METERED ORAL at 11:38

## 2021-02-02 RX ADMIN — Medication 1 APPLICATION(S): at 06:09

## 2021-02-02 RX ADMIN — CHLORHEXIDINE GLUCONATE 1 APPLICATION(S): 213 SOLUTION TOPICAL at 13:17

## 2021-02-02 RX ADMIN — AMLODIPINE BESYLATE 5 MILLIGRAM(S): 2.5 TABLET ORAL at 06:13

## 2021-02-02 RX ADMIN — Medication 1 APPLICATION(S): at 18:16

## 2021-02-02 RX ADMIN — ALBUTEROL 2 PUFF(S): 90 AEROSOL, METERED ORAL at 21:58

## 2021-02-02 RX ADMIN — Medication 50 MILLIGRAM(S): at 06:07

## 2021-02-02 RX ADMIN — OXYCODONE HYDROCHLORIDE 5 MILLIGRAM(S): 5 TABLET ORAL at 13:14

## 2021-02-02 RX ADMIN — CEFTAZIDIME 100 GRAM(S): 6 INJECTION, POWDER, FOR SOLUTION INTRAVENOUS at 13:14

## 2021-02-02 RX ADMIN — Medication 1 TABLET(S): at 18:15

## 2021-02-02 NOTE — PROGRESS NOTE ADULT - SUBJECTIVE AND OBJECTIVE BOX
CHIEF COMPLAINT: Patient is a 60y old  Female who presents with a chief complaint of Transfer from [facility] (01 Feb 2021 11:52)    SUBJECTIVE: No interval events overnight.     [ ] All other systems negative  [ ] Unable to assess ROS because ________    OBJECTIVE:  ICU Vital Signs Last 24 Hrs  T(C): 36.8 (02 Feb 2021 04:00), Max: 37.2 (01 Feb 2021 12:23)  T(F): 98.2 (02 Feb 2021 04:00), Max: 98.9 (01 Feb 2021 12:23)  HR: 85 (02 Feb 2021 08:30) (80 - 99)  BP: 149/62 (02 Feb 2021 04:00) (135/52 - 153/66)  BP(mean): 86 (02 Feb 2021 04:00) (86 - 100)  ABP: --  ABP(mean): --  RR: 24 (02 Feb 2021 04:00) (22 - 30)  SpO2: 99% (02 Feb 2021 08:30) (96% - 100%)    Mode: AC/ CMV (Assist Control/ Continuous Mandatory Ventilation), RR (machine): 12, TV (machine): 450, FiO2: 30, PEEP: 5, MAP: 14, PIP: 30    02-01 @ 07:01  -  02-02 @ 07:00  --------------------------------------------------------  IN: 1312 mL / OUT: 700 mL / NET: 612 mL    CAPILLARY BLOOD GLUCOSE  POCT Blood Glucose.: 129 mg/dL (02 Feb 2021 06:01)    PHYSICAL EXAM:  General:   HEENT:   Lymph Nodes:  Neck:   Respiratory:   Cardiovascular:   Abdomen:   Extremities:   Skin:   Neurological:  Psychiatry:    HOSPITAL MEDICATIONS:  MEDICATIONS  (STANDING):  ALBUTerol    90 MICROgram(s) HFA Inhaler 2 Puff(s) Inhalation every 6 hours  amLODIPine   Tablet 5 milliGRAM(s) Oral daily  ascorbic acid 500 milliGRAM(s) Oral daily  cefTAZidime  IVPB 2 Gram(s) IV Intermittent every 8 hours  chlorhexidine 4% Liquid 1 Application(s) Topical daily  collagenase Ointment 1 Application(s) Topical two times a day  enoxaparin Injectable 100 milliGRAM(s) SubCutaneous every 12 hours  insulin lispro (ADMELOG) corrective regimen sliding scale   SubCutaneous every 6 hours  lactobacillus acidophilus 1 Tablet(s) Oral two times a day  metoprolol tartrate 50 milliGRAM(s) Oral two times a day  multivitamin/minerals/iron Oral Solution (CENTRUM) 15 milliLiter(s) Oral daily  pantoprazole   Suspension 40 milliGRAM(s) Oral daily  tobramycin for Nebulization 300 milliGRAM(s) Inhalation every 12 hours  vancomycin    Solution 125 milliGRAM(s) Oral every 6 hours  zinc sulfate 220 milliGRAM(s) Oral daily    MEDICATIONS  (PRN):  acetaminophen    Suspension .. 650 milliGRAM(s) Oral every 6 hours PRN Temp greater or equal to 38C (100.4F), Mild Pain (1 - 3), Moderate Pain (4 - 6)  artificial tears (preservative free) Ophthalmic Solution 1 Drop(s) Both EYES every 8 hours PRN Dry Eyes  oxyCODONE    Solution 5 milliGRAM(s) Oral every 4 hours PRN Severe Pain (7 - 10)    LABS:                        9.0    7.16  )-----------( 485      ( 02 Feb 2021 06:22 )             31.3     02-02    138  |  101  |  6<L>  ----------------------------<  129<H>  4.1   |  26  |  0.23<L>    Ca    8.8      02 Feb 2021 06:22  Phos  2.8     02-02  Mg     1.8     02-02 CHIEF COMPLAINT: Patient is a 60y old  Female who presents with a chief complaint of Transfer from [facility] (01 Feb 2021 11:52)    SUBJECTIVE: No interval events overnight. Seen by bedside and unable to assess complete ROS given vented. Denies CP or SOB. Notes lots of bowel movements.     OBJECTIVE:  ICU Vital Signs Last 24 Hrs  T(C): 36.8 (02 Feb 2021 04:00), Max: 37.2 (01 Feb 2021 12:23)  T(F): 98.2 (02 Feb 2021 04:00), Max: 98.9 (01 Feb 2021 12:23)  HR: 85 (02 Feb 2021 08:30) (80 - 99)  BP: 149/62 (02 Feb 2021 04:00) (135/52 - 153/66)  BP(mean): 86 (02 Feb 2021 04:00) (86 - 100)  ABP: --  ABP(mean): --  RR: 24 (02 Feb 2021 04:00) (22 - 30)  SpO2: 99% (02 Feb 2021 08:30) (96% - 100%)    Mode: AC/ CMV (Assist Control/ Continuous Mandatory Ventilation), RR (machine): 12, TV (machine): 450, FiO2: 30, PEEP: 5, MAP: 14, PIP: 30    02-01 @ 07:01  -  02-02 @ 07:00  --------------------------------------------------------  IN: 1312 mL / OUT: 700 mL / NET: 612 mL    CAPILLARY BLOOD GLUCOSE  POCT Blood Glucose.: 129 mg/dL (02 Feb 2021 06:01)    PHYSICAL EXAM:  General: NAD, well groomed. (+) Obese.   HEENT: NC/ AT, PERRLA, Tracheostomy clean, dry and intact.   Cardio: RRR, S1/S2, no murmurs or rubs.   Pulm: Coarse vent sounds noted throughout, equal bilaterally.   GI: Soft, NDNT, BS (+). PEG (+)   MS: No pedal edema. AROMI.   Neuro: Awake and alert. Follow commands. No focal neurological deficits noted.   Skin: Warm and dry. No jaundice or cyanosis     HOSPITAL MEDICATIONS:  MEDICATIONS  (STANDING):  ALBUTerol    90 MICROgram(s) HFA Inhaler 2 Puff(s) Inhalation every 6 hours  amLODIPine   Tablet 5 milliGRAM(s) Oral daily  ascorbic acid 500 milliGRAM(s) Oral daily  cefTAZidime  IVPB 2 Gram(s) IV Intermittent every 8 hours  chlorhexidine 4% Liquid 1 Application(s) Topical daily  collagenase Ointment 1 Application(s) Topical two times a day  enoxaparin Injectable 100 milliGRAM(s) SubCutaneous every 12 hours  insulin lispro (ADMELOG) corrective regimen sliding scale   SubCutaneous every 6 hours  lactobacillus acidophilus 1 Tablet(s) Oral two times a day  metoprolol tartrate 50 milliGRAM(s) Oral two times a day  multivitamin/minerals/iron Oral Solution (CENTRUM) 15 milliLiter(s) Oral daily  pantoprazole   Suspension 40 milliGRAM(s) Oral daily  tobramycin for Nebulization 300 milliGRAM(s) Inhalation every 12 hours  vancomycin    Solution 125 milliGRAM(s) Oral every 6 hours  zinc sulfate 220 milliGRAM(s) Oral daily    MEDICATIONS  (PRN):  acetaminophen    Suspension .. 650 milliGRAM(s) Oral every 6 hours PRN Temp greater or equal to 38C (100.4F), Mild Pain (1 - 3), Moderate Pain (4 - 6)  artificial tears (preservative free) Ophthalmic Solution 1 Drop(s) Both EYES every 8 hours PRN Dry Eyes  oxyCODONE    Solution 5 milliGRAM(s) Oral every 4 hours PRN Severe Pain (7 - 10)    LABS:                        9.0    7.16  )-----------( 485      ( 02 Feb 2021 06:22 )             31.3     02-02    138  |  101  |  6<L>  ----------------------------<  129<H>  4.1   |  26  |  0.23<L>    Ca    8.8      02 Feb 2021 06:22  Phos  2.8     02-02  Mg     1.8     02-02

## 2021-02-02 NOTE — PROGRESS NOTE ADULT - ATTENDING COMMENTS
60F a/w COVID 19, acute hypoxemic respiratory failure with ARDS c/b superimposed MSSA cavitary PNA and ARF requiring HD. Pt with failure to wean from mechanical ventilation, s/p tracheostomy placement 12/18 and transfer to RCU 12/28.     1. Neuro - encephalopathy much improved, very deconditioned    2. Pulm - acute respiratory failure - trach placement by CTSx, 12/18 Proximal 7XLT and then changed to 8Fr Bivona 12/31 for persistent air leak  c/w daily SBTs- tolerating PS 5/5, will attempt trach collar.  c/w airway clearance therapy and trach care    3. ID - MSSA bacteremia with cavitary PNA s/p completion of Primaxin on 12/28 and Ceftazidime through 1/5 for Stenotrophomonas PNA.   Recurrence of fevers and (+) sputum cx for Stenotrophomonas and Pseudomonas 1/12 s/p completion of Flagyl+Ceftazidime+Vancomycin  Pt with low grade fevers, repeat cx again (+) for CRE Pseudomonas and Stenotrophomonas, initiated eradication therapy with IV+Nebulized Abx 1/28 given copious secretions and recurrent fevers. Repeat CT Chest shows significantly improved cavitary lesions from prior MSSA PNA without area requiring further source control. Now with C Diff on PO vancomycin.    4. GI -oropharyngeal dysphagia, IR guided PEG successfully placed 1/11. Pt tolerating PEG feeds.     5. Renal - ARF resolved, monitor urine output    6. Endo - DM - NPH+HISS for glucose control.     7. Derm - Wound consult and plastic sx recs appreciated.    8. PPX - pud and DVT     Dispo- ROJELIO when medically optimized .

## 2021-02-02 NOTE — PROGRESS NOTE ADULT - ASSESSMENT
61 YO F with PMHx of HTN, DVT on Xarelto, Peritonitis s/p Oral's Procedure and Ileostomy (reversed in 2011) and AARON who presented to [facility] on 11/18 for AHRF second to COVID 19, intubated on 11/23 complicated by R lung abscesses on CT CHEST 12/5, multibacterial PNA and bacteremia, ARTEMIO s/p CRRT and HD, and s/p Tracheostomy 12/18 c/b track leak and s/p OR 8Bivona. Transferred to RCU for furhter management.     # AMS   - AOX3 at baseline.   - CTH and EEG WNL   - Now weaned off all sedation.   - Monitor mentation and supportive care     # ARDS second to COVID vs Multi-bacterial PNA/ R Lung Abscess  - Intubated 11/23 and course complicated with multi-bacterial PNA and lung abscesses.   - Lung abscess presented on CT CHEST 12/5  - s/p Tracheostomy on 12/18 by CTSx. Sutures out 1/1/2021  - Air leak noted and s/p Trach Exchange for Bivona with CTSx 12/31  - Currently on FULL Vent. PS as tolerated.   - Proventil, Chest PT and Suction PRN. Trach Care QD.     # Vasoplegic vs Septic Shock   - Weaned off pressors. Monitor BP on Norvasc and Metoprolol.   - On full AC for Hx of DVT. CTA CHEST with no PEs.     # ARTEMIO s/p CRRT and HD   - ARTEMIO now resolved and no longer required HD.   - Monitor renal function and avoid nephrotoxins.     # Dysphagia   - Initially CTSx and GI deferred PEG given Ventral Hernia   - s/p PEG placement with IR 1/11   - Continue on TF and monitor tolerance.   - Monitor diarrhea as CDIFF (+). Hold laxatives.     # Sepsis second to COVID vs Multi-bacterial PNA/ Bacteremia and R Lung Abscess vs CDIFF (+)   - COVID with superimposed multi-bacterial VAP vs aspiration PNA vs cavitary PNA.   - SCx (11/24) MSSA and BCx (11/27) with MSSA and Proteus Bacteremia  - SCx (12/1, 12/12 and 12/27) with Stenotrophomonas.   - SCx (1/9) with  Pseudomonas and Stenotrophomonas   - BCX has been persistently negative from 12/1, and most recently 1/12   - CT CHEST 12/4 with right lung cavitation.   - s/p multiple antibiotics, but now completed Fortaz and Flagyl (1/12-1/16)  - RPT CT CHEST 1/28 with improvement of right lung abscess   - SCx with  Pseudomonas and Stentrophomonas noted and started on dual pseudomonal coverage (1/28-2/3)    - CDIFF (+) and started on PO Vancomycin (2/1 - )     # DM2   - Continue on ISS and NPH and monitor FS.   - Adjust insulin as needed.     # Hx of DVT   - Continue on Lovenox FULL DOSE for Xarelto for now.     # Skin/ Facial Wounds   - WOC recommendations appreciated   - Plastics evaluation and recommendations of facial wound appreciated     # DVT/ GI PPX with FULL AC/ PPI   # CODE STATUS - FULL CODE   # DISPO - PT recc Rehab/ Vent Facility    59 YO F with PMHx of HTN, DVT on Xarelto, Peritonitis s/p Oral's Procedure and Ileostomy (reversed in 2011) and AARON who presented to [facility] on 11/18 for AHRF second to COVID 19, intubated on 11/23 complicated by R lung abscesses on CT CHEST 12/5, multibacterial PNA and bacteremia, ARTEMIO s/p CRRT and HD, and s/p Tracheostomy 12/18 c/b track leak and s/p OR 8Bivona. Transferred to RCU for furhter management.     # AMS   - AOX3 at baseline.   - CTH and EEG WNL   - Now weaned off all sedation.   - Monitor mentation and supportive care     # ARDS second to COVID vs Multi-bacterial PNA/ R Lung Abscess  - Intubated 11/23 and course complicated with multi-bacterial PNA and lung abscesses.   - Lung abscess presented on CT CHEST 12/5  - s/p Tracheostomy on 12/18 by CTSx. Sutures out 1/1/2021  - Air leak noted and s/p Trach Exchange for Bivona with CTSx 12/31  - Currently on FULL Vent. PS as tolerated.   - Proventil, IPV and Chest PT Q6H and Suction PRN. Trach Care QD.     # Vasoplegic vs Septic Shock   - Weaned off pressors. Monitor BP on Norvasc and Metoprolol.   - On full AC for Hx of DVT. CTA CHEST with no PEs.     # ARTMEIO s/p CRRT and HD   - ARTEMIO now resolved and no longer required HD.   - Monitor renal function and avoid nephrotoxins.     # Dysphagia   - Initially CTSx and GI deferred PEG given Ventral Hernia   - s/p PEG placement with IR 1/11   - Continue on TF and monitor tolerance.   - Monitor diarrhea as CDIFF (+). Hold laxatives.     # Sepsis second to COVID vs Multi-bacterial PNA/ Bacteremia and R Lung Abscess vs CDIFF (+)   - COVID with superimposed multi-bacterial VAP vs aspiration PNA vs cavitary PNA.   - SCx (11/24) MSSA and BCx (11/27) with MSSA and Proteus Bacteremia  - SCx (12/1, 12/12 and 12/27) with Stenotrophomonas.   - SCx (1/9) with  Pseudomonas and Stenotrophomonas   - BCX has been persistently negative from 12/1, and most recently 1/12   - CT CHEST 12/4 with right lung cavitation.   - s/p multiple antibiotics, but now completed Fortaz and Flagyl (1/12-1/16)  - RPT CT CHEST 1/28 with improvement of right lung abscess   - SCx with  Pseudomonas and Stenotrophomonas noted and started on dual pseudomonal coverage (1/28-2/3)    - CDIFF (+) and started on PO Vancomycin (2/1 - 2/10)     # DM2   - Continue on ISS and NPH and monitor FS.   - Adjust insulin as needed.     # Hx of DVT   - Continue on Lovenox FULL DOSE for Xarelto for now.     # Skin/ Facial Wounds   - WOC recommendations appreciated   - Plastics evaluation and recommendations of facial wound appreciated     # DVT/ GI PPX with FULL AC/ PPI   # CODE STATUS - FULL CODE   # DISPO - PT recc Rehab/ Vent Facility

## 2021-02-03 LAB
GLUCOSE BLDC GLUCOMTR-MCNC: 126 MG/DL — HIGH (ref 70–99)
GLUCOSE BLDC GLUCOMTR-MCNC: 134 MG/DL — HIGH (ref 70–99)
GLUCOSE BLDC GLUCOMTR-MCNC: 141 MG/DL — HIGH (ref 70–99)
GLUCOSE BLDC GLUCOMTR-MCNC: 167 MG/DL — HIGH (ref 70–99)

## 2021-02-03 PROCEDURE — 99233 SBSQ HOSP IP/OBS HIGH 50: CPT

## 2021-02-03 RX ORDER — SODIUM BICARBONATE 1 MEQ/ML
650 SYRINGE (ML) INTRAVENOUS ONCE
Refills: 0 | Status: COMPLETED | OUTPATIENT
Start: 2021-02-03 | End: 2021-02-03

## 2021-02-03 RX ORDER — SODIUM CHLORIDE 9 MG/ML
1000 INJECTION, SOLUTION INTRAVENOUS
Refills: 0 | Status: DISCONTINUED | OUTPATIENT
Start: 2021-02-03 | End: 2021-03-19

## 2021-02-03 RX ORDER — LIPASE/PROTEASE/AMYLASE 16-48-48K
1 CAPSULE,DELAYED RELEASE (ENTERIC COATED) ORAL ONCE
Refills: 0 | Status: DISCONTINUED | OUTPATIENT
Start: 2021-02-03 | End: 2021-02-08

## 2021-02-03 RX ORDER — DEXTROSE 50 % IN WATER 50 %
15 SYRINGE (ML) INTRAVENOUS ONCE
Refills: 0 | Status: DISCONTINUED | OUTPATIENT
Start: 2021-02-03 | End: 2021-03-19

## 2021-02-03 RX ORDER — DEXTROSE 50 % IN WATER 50 %
25 SYRINGE (ML) INTRAVENOUS ONCE
Refills: 0 | Status: DISCONTINUED | OUTPATIENT
Start: 2021-02-03 | End: 2021-03-19

## 2021-02-03 RX ORDER — DEXTROSE 50 % IN WATER 50 %
12.5 SYRINGE (ML) INTRAVENOUS ONCE
Refills: 0 | Status: DISCONTINUED | OUTPATIENT
Start: 2021-02-03 | End: 2021-03-19

## 2021-02-03 RX ORDER — GLUCAGON INJECTION, SOLUTION 0.5 MG/.1ML
1 INJECTION, SOLUTION SUBCUTANEOUS ONCE
Refills: 0 | Status: DISCONTINUED | OUTPATIENT
Start: 2021-02-03 | End: 2021-03-19

## 2021-02-03 RX ADMIN — ALBUTEROL 2 PUFF(S): 90 AEROSOL, METERED ORAL at 23:20

## 2021-02-03 RX ADMIN — AMLODIPINE BESYLATE 5 MILLIGRAM(S): 2.5 TABLET ORAL at 06:23

## 2021-02-03 RX ADMIN — Medication 125 MILLIGRAM(S): at 22:13

## 2021-02-03 RX ADMIN — Medication 1 APPLICATION(S): at 06:23

## 2021-02-03 RX ADMIN — CEFTAZIDIME 100 GRAM(S): 6 INJECTION, POWDER, FOR SOLUTION INTRAVENOUS at 22:13

## 2021-02-03 RX ADMIN — Medication 50 MILLIGRAM(S): at 06:23

## 2021-02-03 RX ADMIN — PANTOPRAZOLE SODIUM 40 MILLIGRAM(S): 20 TABLET, DELAYED RELEASE ORAL at 12:37

## 2021-02-03 RX ADMIN — ALBUTEROL 2 PUFF(S): 90 AEROSOL, METERED ORAL at 03:53

## 2021-02-03 RX ADMIN — ENOXAPARIN SODIUM 100 MILLIGRAM(S): 100 INJECTION SUBCUTANEOUS at 06:24

## 2021-02-03 RX ADMIN — ALBUTEROL 2 PUFF(S): 90 AEROSOL, METERED ORAL at 16:23

## 2021-02-03 RX ADMIN — Medication 125 MILLIGRAM(S): at 01:24

## 2021-02-03 RX ADMIN — CHLORHEXIDINE GLUCONATE 1 APPLICATION(S): 213 SOLUTION TOPICAL at 12:38

## 2021-02-03 RX ADMIN — Medication 500 MILLIGRAM(S): at 12:36

## 2021-02-03 RX ADMIN — Medication 125 MILLIGRAM(S): at 06:24

## 2021-02-03 RX ADMIN — Medication 2: at 12:36

## 2021-02-03 RX ADMIN — ZINC SULFATE TAB 220 MG (50 MG ZINC EQUIVALENT) 220 MILLIGRAM(S): 220 (50 ZN) TAB at 12:36

## 2021-02-03 RX ADMIN — Medication 1 TABLET(S): at 06:23

## 2021-02-03 RX ADMIN — Medication 15 MILLILITER(S): at 12:38

## 2021-02-03 RX ADMIN — Medication 125 MILLIGRAM(S): at 12:36

## 2021-02-03 RX ADMIN — OXYCODONE HYDROCHLORIDE 5 MILLIGRAM(S): 5 TABLET ORAL at 06:56

## 2021-02-03 RX ADMIN — Medication 1 APPLICATION(S): at 18:23

## 2021-02-03 RX ADMIN — Medication 1 TABLET(S): at 18:22

## 2021-02-03 RX ADMIN — Medication 50 MILLIGRAM(S): at 18:22

## 2021-02-03 RX ADMIN — CEFTAZIDIME 100 GRAM(S): 6 INJECTION, POWDER, FOR SOLUTION INTRAVENOUS at 06:24

## 2021-02-03 RX ADMIN — ENOXAPARIN SODIUM 100 MILLIGRAM(S): 100 INJECTION SUBCUTANEOUS at 18:23

## 2021-02-03 RX ADMIN — Medication 125 MILLIGRAM(S): at 18:22

## 2021-02-03 RX ADMIN — CEFTAZIDIME 100 GRAM(S): 6 INJECTION, POWDER, FOR SOLUTION INTRAVENOUS at 12:38

## 2021-02-03 RX ADMIN — ALBUTEROL 2 PUFF(S): 90 AEROSOL, METERED ORAL at 10:00

## 2021-02-03 RX ADMIN — Medication 300 MILLIGRAM(S): at 10:00

## 2021-02-03 NOTE — PROGRESS NOTE ADULT - ASSESSMENT
59 YO F with PMHx of HTN, DVT on Xarelto, Peritonitis s/p Oral's Procedure and Ileostomy (reversed in 2011) and AARON who presented to [facility] on 11/18 for AHRF second to COVID 19, intubated on 11/23 complicated by R lung abscesses on CT CHEST 12/5, multibacterial PNA and bacteremia, ARTEMIO s/p CRRT and HD, and s/p Tracheostomy 12/18 c/b track leak and s/p OR 8Bivona. Transferred to RCU for furhter management.     # AMS   - AOX3 at baseline.   - CTH and EEG WNL   - Now weaned off all sedation.   - Monitor mentation and supportive care     # ARDS second to COVID vs Multi-bacterial PNA/ R Lung Abscess  - Intubated 11/23 and course complicated with multi-bacterial PNA and lung abscesses.   - Lung abscess presented on CT CHEST 12/5  - s/p Tracheostomy on 12/18 by CTSx. Sutures out 1/1/2021  - Air leak noted and s/p Trach Exchange for Bivona with CTSx 12/31  - Currently on FULL Vent. PS as tolerated.   - Proventil, IPV and Chest PT Q6H and Suction PRN. Trach Care QD.     # Vasoplegic vs Septic Shock   - Weaned off pressors. Monitor BP on Norvasc and Metoprolol.   - On full AC for Hx of DVT. CTA CHEST with no PEs.     # ARTEMIO s/p CRRT and HD   - ARTEMIO now resolved and no longer required HD.   - Monitor renal function and avoid nephrotoxins.     # Dysphagia   - Initially CTSx and GI deferred PEG given Ventral Hernia   - s/p PEG placement with IR 1/11   - Continue on TF and monitor tolerance.   - Monitor diarrhea as CDIFF (+). Hold laxatives.     # Sepsis second to COVID vs Multi-bacterial PNA/ Bacteremia and R Lung Abscess vs CDIFF (+)   - COVID with superimposed multi-bacterial VAP vs aspiration PNA vs cavitary PNA.   - SCx (11/24) MSSA and BCx (11/27) with MSSA and Proteus Bacteremia  - SCx (12/1, 12/12 and 12/27) with Stenotrophomonas.   - SCx (1/9) with  Pseudomonas and Stenotrophomonas   - BCX has been persistently negative from 12/1, and most recently 1/12   - CT CHEST 12/4 with right lung cavitation.   - s/p multiple antibiotics, but now completed Fortaz and Flagyl (1/12-1/16)  - RPT CT CHEST 1/28 with improvement of right lung abscess   - SCx with  Pseudomonas and Stenotrophomonas noted and started on dual pseudomonal coverage (1/28-2/3)    - CDIFF (+) and started on PO Vancomycin (2/1 - 2/10)     # DM2   - Continue on ISS and NPH and monitor FS.   - Adjust insulin as needed.     # Hx of DVT   - Continue on Lovenox FULL DOSE for Xarelto for now.     # Skin/ Facial Wounds   - WOC recommendations appreciated   - Plastics evaluation and recommendations of facial wound appreciated     # DVT/ GI PPX with FULL AC/ PPI   # CODE STATUS - FULL CODE   # DISPO - PT recc Rehab/ Vent Facility    59 YO F with PMHx of HTN, DVT on Xarelto, Peritonitis s/p Oral's Procedure and Ileostomy (reversed in 2011) and AARON who presented to [facility] on 11/18 for AHRF second to COVID 19, intubated on 11/23 complicated by R lung abscesses on CT CHEST 12/5, multibacterial PNA and bacteremia, ARTEMIO s/p CRRT and HD, and s/p Tracheostomy 12/18 c/b track leak and s/p OR 8Bivona. Transferred to RCU for furhter management.     # AMS   - AOX3 at baseline.   - CTH and EEG WNL   - Now weaned off all sedation.   - Monitor mentation and supportive care     # ARDS second to COVID vs Multi-bacterial PNA/ R Lung Abscess  - Intubated 11/23 and course complicated with multi-bacterial PNA and lung abscesses.   - Lung abscess presented on CT CHEST 12/5  - s/p Tracheostomy on 12/18 by CTSx. Sutures out 1/1/2021  - Air leak noted and s/p Trach Exchange for Bivona with CTSx 12/31  - Currently on FULL Vent. PS 15/5/30 as tolerated.   - Proventil, IPV and Chest PT Q6H and Suction PRN. Trach Care QD.     # Vasoplegic vs Septic Shock   - Weaned off pressors. Monitor BP on Norvasc and Metoprolol.   - On full AC for Hx of DVT. CTA CHEST with no PEs.     # ARTEMIO s/p CRRT and HD   - ARTEMIO now resolved and no longer required HD.   - Monitor renal function and avoid nephrotoxins.     # Dysphagia   - Initially CTSx and GI deferred PEG given Ventral Hernia   - s/p PEG placement with IR 1/11   - Continue on TF and monitor tolerance.   - Monitor diarrhea as CDIFF (+). Hold laxatives.     # Sepsis second to COVID vs Multi-bacterial PNA/ Bacteremia and R Lung Abscess vs CDIFF (+)   - COVID with superimposed multi-bacterial VAP vs aspiration PNA vs cavitary PNA.   - SCx (11/24) MSSA and BCx (11/27) with MSSA and Proteus Bacteremia  - SCx (12/1, 12/12 and 12/27) with Stenotrophomonas.   - SCx (1/9) with  Pseudomonas and Stenotrophomonas   - BCX has been persistently negative from 12/1, and most recently 1/12   - CT CHEST 12/4 with right lung cavitation.   - s/p multiple antibiotics, but now completed Fortaz and Flagyl (1/12-1/16)  - RPT CT CHEST 1/28 with improvement of right lung abscess   - SCx with  Pseudomonas and Stenotrophomonas noted and started on dual pseudomonal coverage (1/28-2/3)    - CDIFF (+) and started on PO Vancomycin (2/1 - 2/10)     # DM2   - Continue on ISS and NPH and monitor FS.   - Adjust insulin as needed.     # Hx of DVT   - Continue on Lovenox FULL DOSE for Xarelto for now.     # Skin/ Facial Wounds   - WOC recommendations appreciated   - Plastics evaluation and recommendations of facial wound appreciated     # DVT/ GI PPX with FULL AC/ PPI   # CODE STATUS - FULL CODE   # DISPO - PT recc Rehab/ Vent Facility

## 2021-02-03 NOTE — PROGRESS NOTE ADULT - SUBJECTIVE AND OBJECTIVE BOX
CHIEF COMPLAINT: Patient is a 60y old  Female who presents with a chief complaint of Transfer from [facility] (02 Feb 2021 08:37)    SUBJECTIVE: No interval events overnight.     [ ] All other systems negative  [ ] Unable to assess ROS because ________    OBJECTIVE:  ICU Vital Signs Last 24 Hrs  T(C): 36.4 (03 Feb 2021 04:00), Max: 36.9 (02 Feb 2021 13:12)  T(F): 97.6 (03 Feb 2021 04:00), Max: 98.5 (02 Feb 2021 13:12)  HR: 80 (03 Feb 2021 08:17) (80 - 104)  BP: 153/60 (03 Feb 2021 04:00) (148/67 - 153/71)  BP(mean): 88 (03 Feb 2021 04:00) (88 - 95)  ABP: --  ABP(mean): --  RR: 24 (03 Feb 2021 04:00) (22 - 24)  SpO2: 99% (03 Feb 2021 08:17) (95% - 100%)    Mode: AC/ CMV (Assist Control/ Continuous Mandatory Ventilation), RR (machine): 12, TV (machine): 450, FiO2: 30, PEEP: 5, MAP: 14, PIP: 37    02-02 @ 07:01  -  02-03 @ 07:00  --------------------------------------------------------  IN: 900 mL / OUT: 1245 mL / NET: -345 mL    CAPILLARY BLOOD GLUCOSE  POCT Blood Glucose.: 134 mg/dL (03 Feb 2021 05:51)    PHYSICAL EXAM:  General:   HEENT:   Lymph Nodes:  Neck:   Respiratory:   Cardiovascular:   Abdomen:   Extremities:   Skin:   Neurological:  Psychiatry:    HOSPITAL MEDICATIONS:  MEDICATIONS  (STANDING):  ALBUTerol    90 MICROgram(s) HFA Inhaler 2 Puff(s) Inhalation every 6 hours  amLODIPine   Tablet 5 milliGRAM(s) Oral daily  ascorbic acid 500 milliGRAM(s) Oral daily  cefTAZidime  IVPB 2 Gram(s) IV Intermittent every 8 hours  chlorhexidine 4% Liquid 1 Application(s) Topical daily  collagenase Ointment 1 Application(s) Topical two times a day  dextrose 40% Gel 15 Gram(s) Oral once  dextrose 5%. 1000 milliLiter(s) (50 mL/Hr) IV Continuous <Continuous>  dextrose 5%. 1000 milliLiter(s) (100 mL/Hr) IV Continuous <Continuous>  dextrose 50% Injectable 25 Gram(s) IV Push once  dextrose 50% Injectable 12.5 Gram(s) IV Push once  dextrose 50% Injectable 25 Gram(s) IV Push once  enoxaparin Injectable 100 milliGRAM(s) SubCutaneous every 12 hours  glucagon  Injectable 1 milliGRAM(s) IntraMuscular once  insulin lispro (ADMELOG) corrective regimen sliding scale   SubCutaneous every 6 hours  lactobacillus acidophilus 1 Tablet(s) Oral two times a day  metoprolol tartrate 50 milliGRAM(s) Oral two times a day  multivitamin/minerals/iron Oral Solution (CENTRUM) 15 milliLiter(s) Oral daily  pantoprazole   Suspension 40 milliGRAM(s) Oral daily  tobramycin for Nebulization 300 milliGRAM(s) Inhalation every 12 hours  vancomycin    Solution 125 milliGRAM(s) Oral every 6 hours  zinc sulfate 220 milliGRAM(s) Oral daily    MEDICATIONS  (PRN):  acetaminophen    Suspension .. 650 milliGRAM(s) Oral every 6 hours PRN Temp greater or equal to 38C (100.4F), Mild Pain (1 - 3), Moderate Pain (4 - 6)  artificial tears (preservative free) Ophthalmic Solution 1 Drop(s) Both EYES every 8 hours PRN Dry Eyes  oxyCODONE    Solution 5 milliGRAM(s) Oral every 4 hours PRN Severe Pain (7 - 10)    LABS:                        9.0    7.16  )-----------( 485      ( 02 Feb 2021 06:22 )             31.3     02-02    138  |  101  |  6<L>  ----------------------------<  129<H>  4.1   |  26  |  0.23<L>    Ca    8.8      02 Feb 2021 06:22  Phos  2.8     02-02  Mg     1.8     02-02 CHIEF COMPLAINT: Patient is a 60y old  Female who presents with a chief complaint of Transfer [facility] (02 Feb 2021 08:37)    SUBJECTIVE: No interval events overnight. Seen by bedside during AM rounds and notes no complaints. PS 15/5 trial today.     OBJECTIVE:  ICU Vital Signs Last 24 Hrs  T(C): 36.4 (03 Feb 2021 04:00), Max: 36.9 (02 Feb 2021 13:12)  T(F): 97.6 (03 Feb 2021 04:00), Max: 98.5 (02 Feb 2021 13:12)  HR: 80 (03 Feb 2021 08:17) (80 - 104)  BP: 153/60 (03 Feb 2021 04:00) (148/67 - 153/71)  BP(mean): 88 (03 Feb 2021 04:00) (88 - 95)  ABP: --  ABP(mean): --  RR: 24 (03 Feb 2021 04:00) (22 - 24)  SpO2: 99% (03 Feb 2021 08:17) (95% - 100%)    Mode: AC/ CMV (Assist Control/ Continuous Mandatory Ventilation), RR (machine): 12, TV (machine): 450, FiO2: 30, PEEP: 5, MAP: 14, PIP: 37    02-02 @ 07:01  -  02-03 @ 07:00  --------------------------------------------------------  IN: 900 mL / OUT: 1245 mL / NET: -345 mL    CAPILLARY BLOOD GLUCOSE  POCT Blood Glucose.: 134 mg/dL (03 Feb 2021 05:51)    PHYSICAL EXAM:  General: NAD, well groomed. (+) Obese.   HEENT: NC/ AT, PERRLA, Tracheostomy clean, dry and intact.   Cardio: RRR, S1/S2, no murmurs or rubs.   Pulm: Coarse vent sounds noted throughout, equal bilaterally.   GI: Soft, NDNT, BS (+). PEG (+)   MS: No pedal edema. AROMI.   Neuro: Awake and alert. Follow commands. No focal neurological deficits noted.   Skin: Warm and dry. No jaundice or cyanosis     HOSPITAL MEDICATIONS:  MEDICATIONS  (STANDING):  ALBUTerol    90 MICROgram(s) HFA Inhaler 2 Puff(s) Inhalation every 6 hours  amLODIPine   Tablet 5 milliGRAM(s) Oral daily  ascorbic acid 500 milliGRAM(s) Oral daily  cefTAZidime  IVPB 2 Gram(s) IV Intermittent every 8 hours  chlorhexidine 4% Liquid 1 Application(s) Topical daily  collagenase Ointment 1 Application(s) Topical two times a day  dextrose 40% Gel 15 Gram(s) Oral once  dextrose 5%. 1000 milliLiter(s) (50 mL/Hr) IV Continuous <Continuous>  dextrose 5%. 1000 milliLiter(s) (100 mL/Hr) IV Continuous <Continuous>  dextrose 50% Injectable 25 Gram(s) IV Push once  dextrose 50% Injectable 12.5 Gram(s) IV Push once  dextrose 50% Injectable 25 Gram(s) IV Push once  enoxaparin Injectable 100 milliGRAM(s) SubCutaneous every 12 hours  glucagon  Injectable 1 milliGRAM(s) IntraMuscular once  insulin lispro (ADMELOG) corrective regimen sliding scale   SubCutaneous every 6 hours  lactobacillus acidophilus 1 Tablet(s) Oral two times a day  metoprolol tartrate 50 milliGRAM(s) Oral two times a day  multivitamin/minerals/iron Oral Solution (CENTRUM) 15 milliLiter(s) Oral daily  pantoprazole   Suspension 40 milliGRAM(s) Oral daily  tobramycin for Nebulization 300 milliGRAM(s) Inhalation every 12 hours  vancomycin    Solution 125 milliGRAM(s) Oral every 6 hours  zinc sulfate 220 milliGRAM(s) Oral daily    MEDICATIONS  (PRN):  acetaminophen    Suspension .. 650 milliGRAM(s) Oral every 6 hours PRN Temp greater or equal to 38C (100.4F), Mild Pain (1 - 3), Moderate Pain (4 - 6)  artificial tears (preservative free) Ophthalmic Solution 1 Drop(s) Both EYES every 8 hours PRN Dry Eyes  oxyCODONE    Solution 5 milliGRAM(s) Oral every 4 hours PRN Severe Pain (7 - 10)    LABS:                        9.0    7.16  )-----------( 485      ( 02 Feb 2021 06:22 )             31.3     02-02    138  |  101  |  6<L>  ----------------------------<  129<H>  4.1   |  26  |  0.23<L>    Ca    8.8      02 Feb 2021 06:22  Phos  2.8     02-02  Mg     1.8     02-02

## 2021-02-04 LAB
ANION GAP SERPL CALC-SCNC: 13 MMOL/L — SIGNIFICANT CHANGE UP (ref 7–14)
BUN SERPL-MCNC: 6 MG/DL — LOW (ref 7–23)
CALCIUM SERPL-MCNC: 9.3 MG/DL — SIGNIFICANT CHANGE UP (ref 8.4–10.5)
CHLORIDE SERPL-SCNC: 99 MMOL/L — SIGNIFICANT CHANGE UP (ref 98–107)
CO2 SERPL-SCNC: 25 MMOL/L — SIGNIFICANT CHANGE UP (ref 22–31)
CREAT SERPL-MCNC: 0.21 MG/DL — LOW (ref 0.5–1.3)
GLUCOSE BLDC GLUCOMTR-MCNC: 115 MG/DL — HIGH (ref 70–99)
GLUCOSE BLDC GLUCOMTR-MCNC: 135 MG/DL — HIGH (ref 70–99)
GLUCOSE BLDC GLUCOMTR-MCNC: 145 MG/DL — HIGH (ref 70–99)
GLUCOSE BLDC GLUCOMTR-MCNC: 166 MG/DL — HIGH (ref 70–99)
GLUCOSE SERPL-MCNC: 157 MG/DL — HIGH (ref 70–99)
HCT VFR BLD CALC: 36.7 % — SIGNIFICANT CHANGE UP (ref 34.5–45)
HGB BLD-MCNC: 10.4 G/DL — LOW (ref 11.5–15.5)
MAGNESIUM SERPL-MCNC: 1.6 MG/DL — SIGNIFICANT CHANGE UP (ref 1.6–2.6)
MCHC RBC-ENTMCNC: 27.5 PG — SIGNIFICANT CHANGE UP (ref 27–34)
MCHC RBC-ENTMCNC: 28.3 GM/DL — LOW (ref 32–36)
MCV RBC AUTO: 97.1 FL — SIGNIFICANT CHANGE UP (ref 80–100)
NRBC # BLD: 0 /100 WBCS — SIGNIFICANT CHANGE UP
NRBC # FLD: 0 K/UL — SIGNIFICANT CHANGE UP
PHOSPHATE SERPL-MCNC: 3 MG/DL — SIGNIFICANT CHANGE UP (ref 2.5–4.5)
PLATELET # BLD AUTO: 517 K/UL — HIGH (ref 150–400)
POTASSIUM SERPL-MCNC: 3.8 MMOL/L — SIGNIFICANT CHANGE UP (ref 3.5–5.3)
POTASSIUM SERPL-SCNC: 3.8 MMOL/L — SIGNIFICANT CHANGE UP (ref 3.5–5.3)
RBC # BLD: 3.78 M/UL — LOW (ref 3.8–5.2)
RBC # FLD: 16.7 % — HIGH (ref 10.3–14.5)
SODIUM SERPL-SCNC: 137 MMOL/L — SIGNIFICANT CHANGE UP (ref 135–145)
WBC # BLD: 8.19 K/UL — SIGNIFICANT CHANGE UP (ref 3.8–10.5)
WBC # FLD AUTO: 8.19 K/UL — SIGNIFICANT CHANGE UP (ref 3.8–10.5)

## 2021-02-04 PROCEDURE — 99233 SBSQ HOSP IP/OBS HIGH 50: CPT

## 2021-02-04 RX ORDER — OXYCODONE HYDROCHLORIDE 5 MG/1
5 TABLET ORAL EVERY 4 HOURS
Refills: 0 | Status: DISCONTINUED | OUTPATIENT
Start: 2021-02-04 | End: 2021-02-11

## 2021-02-04 RX ADMIN — ALBUTEROL 2 PUFF(S): 90 AEROSOL, METERED ORAL at 22:52

## 2021-02-04 RX ADMIN — CEFTAZIDIME 100 GRAM(S): 6 INJECTION, POWDER, FOR SOLUTION INTRAVENOUS at 12:29

## 2021-02-04 RX ADMIN — AMLODIPINE BESYLATE 5 MILLIGRAM(S): 2.5 TABLET ORAL at 06:26

## 2021-02-04 RX ADMIN — Medication 300 MILLIGRAM(S): at 11:35

## 2021-02-04 RX ADMIN — ENOXAPARIN SODIUM 100 MILLIGRAM(S): 100 INJECTION SUBCUTANEOUS at 06:27

## 2021-02-04 RX ADMIN — Medication 125 MILLIGRAM(S): at 06:26

## 2021-02-04 RX ADMIN — ALBUTEROL 2 PUFF(S): 90 AEROSOL, METERED ORAL at 04:02

## 2021-02-04 RX ADMIN — Medication 1 TABLET(S): at 18:00

## 2021-02-04 RX ADMIN — Medication 500 MILLIGRAM(S): at 11:18

## 2021-02-04 RX ADMIN — CHLORHEXIDINE GLUCONATE 1 APPLICATION(S): 213 SOLUTION TOPICAL at 11:19

## 2021-02-04 RX ADMIN — ZINC SULFATE TAB 220 MG (50 MG ZINC EQUIVALENT) 220 MILLIGRAM(S): 220 (50 ZN) TAB at 11:18

## 2021-02-04 RX ADMIN — Medication 50 MILLIGRAM(S): at 18:00

## 2021-02-04 RX ADMIN — Medication 1 TABLET(S): at 06:25

## 2021-02-04 RX ADMIN — Medication 2: at 12:29

## 2021-02-04 RX ADMIN — CEFTAZIDIME 100 GRAM(S): 6 INJECTION, POWDER, FOR SOLUTION INTRAVENOUS at 21:43

## 2021-02-04 RX ADMIN — ALBUTEROL 2 PUFF(S): 90 AEROSOL, METERED ORAL at 11:36

## 2021-02-04 RX ADMIN — Medication 125 MILLIGRAM(S): at 11:18

## 2021-02-04 RX ADMIN — CEFTAZIDIME 100 GRAM(S): 6 INJECTION, POWDER, FOR SOLUTION INTRAVENOUS at 06:26

## 2021-02-04 RX ADMIN — ALBUTEROL 2 PUFF(S): 90 AEROSOL, METERED ORAL at 16:03

## 2021-02-04 RX ADMIN — Medication 300 MILLIGRAM(S): at 22:52

## 2021-02-04 RX ADMIN — Medication 125 MILLIGRAM(S): at 18:00

## 2021-02-04 RX ADMIN — OXYCODONE HYDROCHLORIDE 5 MILLIGRAM(S): 5 TABLET ORAL at 09:17

## 2021-02-04 RX ADMIN — Medication 1 APPLICATION(S): at 06:27

## 2021-02-04 RX ADMIN — Medication 15 MILLILITER(S): at 11:19

## 2021-02-04 RX ADMIN — PANTOPRAZOLE SODIUM 40 MILLIGRAM(S): 20 TABLET, DELAYED RELEASE ORAL at 11:18

## 2021-02-04 RX ADMIN — ENOXAPARIN SODIUM 100 MILLIGRAM(S): 100 INJECTION SUBCUTANEOUS at 18:00

## 2021-02-04 RX ADMIN — Medication 1 APPLICATION(S): at 18:00

## 2021-02-04 RX ADMIN — Medication 650 MILLIGRAM(S): at 06:25

## 2021-02-04 RX ADMIN — Medication 50 MILLIGRAM(S): at 06:25

## 2021-02-04 NOTE — PROGRESS NOTE ADULT - SUBJECTIVE AND OBJECTIVE BOX
CHIEF COMPLAINT: Patient is a 60y old  Female who presents with a chief complaint of Transfer from [facility] (03 Feb 2021 08:41)      Interval Events: Pt seen and evaluated by team at bedside     REVIEW OF SYSTEMS:  Constitutional:   Eyes:  ENT:  CV:  Resp:  GI:  :  MSK:  Integumentary:  Neurological:  Psychiatric:  Endocrine:  Hematologic/Lymphatic:  Allergic/Immunologic:  [ ] All other systems negative  [ ] Unable to assess ROS because ________    OBJECTIVE:  ICU Vital Signs Last 24 Hrs  T(C): 37.1 (04 Feb 2021 06:23), Max: 37.1 (04 Feb 2021 06:23)  T(F): 98.8 (04 Feb 2021 06:23), Max: 98.8 (04 Feb 2021 06:23)  HR: 84 (04 Feb 2021 08:02) (78 - 97)  BP: 165/82 (04 Feb 2021 06:23) (155/69 - 170/72)  BP(mean): --  ABP: --  ABP(mean): --  RR: 22 (04 Feb 2021 06:23) (22 - 27)  SpO2: 100% (04 Feb 2021 08:02) (99% - 100%)    Mode: AC/ CMV (Assist Control/ Continuous Mandatory Ventilation), RR (machine): 12, TV (machine): 450, FiO2: 40, PEEP: 5, MAP: 10, PIP: 25    02-03 @ 07:01  -  02-04 @ 07:00  --------------------------------------------------------  IN: 1404 mL / OUT: 1000 mL / NET: 404 mL      CAPILLARY BLOOD GLUCOSE      POCT Blood Glucose.: 145 mg/dL (04 Feb 2021 06:35)      PHYSICAL EXAM:  General:   HEENT:   Lymph Nodes:  Neck:   Respiratory:   Cardiovascular:   Abdomen:   Extremities:   Skin:   Neurological:  Psychiatry:    HOSPITAL MEDICATIONS:  MEDICATIONS  (STANDING):  ALBUTerol    90 MICROgram(s) HFA Inhaler 2 Puff(s) Inhalation every 6 hours  amLODIPine   Tablet 5 milliGRAM(s) Oral daily  ascorbic acid 500 milliGRAM(s) Oral daily  cefTAZidime  IVPB 2 Gram(s) IV Intermittent every 8 hours  chlorhexidine 4% Liquid 1 Application(s) Topical daily  collagenase Ointment 1 Application(s) Topical two times a day  dextrose 40% Gel 15 Gram(s) Oral once  dextrose 5%. 1000 milliLiter(s) (50 mL/Hr) IV Continuous <Continuous>  dextrose 5%. 1000 milliLiter(s) (100 mL/Hr) IV Continuous <Continuous>  dextrose 50% Injectable 25 Gram(s) IV Push once  dextrose 50% Injectable 12.5 Gram(s) IV Push once  dextrose 50% Injectable 25 Gram(s) IV Push once  enoxaparin Injectable 100 milliGRAM(s) SubCutaneous every 12 hours  glucagon  Injectable 1 milliGRAM(s) IntraMuscular once  insulin lispro (ADMELOG) corrective regimen sliding scale   SubCutaneous every 6 hours  lactobacillus acidophilus 1 Tablet(s) Oral two times a day  metoprolol tartrate 50 milliGRAM(s) Oral two times a day  multivitamin/minerals/iron Oral Solution (CENTRUM) 15 milliLiter(s) Oral daily  pancrelipase (VIOKACE 20,880 Lipase Units) 1 Tablet(s) Oral once  pantoprazole   Suspension 40 milliGRAM(s) Oral daily  tobramycin for Nebulization 300 milliGRAM(s) Inhalation every 12 hours  vancomycin    Solution 125 milliGRAM(s) Oral every 6 hours  zinc sulfate 220 milliGRAM(s) Oral daily    MEDICATIONS  (PRN):  acetaminophen    Suspension .. 650 milliGRAM(s) Oral every 6 hours PRN Temp greater or equal to 38C (100.4F), Mild Pain (1 - 3), Moderate Pain (4 - 6)  artificial tears (preservative free) Ophthalmic Solution 1 Drop(s) Both EYES every 8 hours PRN Dry Eyes  oxyCODONE    Solution 5 milliGRAM(s) Oral every 4 hours PRN Severe Pain (7 - 10)      LABS:                    MICROBIOLOGY:     RADIOLOGY:  [ ] Reviewed and interpreted by me    PULMONARY FUNCTION TESTS:    EKG: CHIEF COMPLAINT: Patient is a 60y old  Female who presents with a chief complaint of Transfer from [facility] (03 Feb 2021 08:41)      Interval Events: Pt seen and evaluated by team at bedside     REVIEW OF SYSTEMS:  Constitutional: Denies pain   Eyes:  ENT: Denies   CV: Denies   Resp: Denies   GI: Denies   [x ] All other systems negative    OBJECTIVE:  ICU Vital Signs Last 24 Hrs  T(C): 37.1 (04 Feb 2021 06:23), Max: 37.1 (04 Feb 2021 06:23)  T(F): 98.8 (04 Feb 2021 06:23), Max: 98.8 (04 Feb 2021 06:23)  HR: 84 (04 Feb 2021 08:02) (78 - 97)  BP: 165/82 (04 Feb 2021 06:23) (155/69 - 170/72)  BP(mean): --  ABP: --  ABP(mean): --  RR: 22 (04 Feb 2021 06:23) (22 - 27)  SpO2: 100% (04 Feb 2021 08:02) (99% - 100%)    Mode: AC/ CMV (Assist Control/ Continuous Mandatory Ventilation), RR (machine): 12, TV (machine): 450, FiO2: 40, PEEP: 5, MAP: 10, PIP: 25    02-03 @ 07:01  -  02-04 @ 07:00  --------------------------------------------------------  IN: 1404 mL / OUT: 1000 mL / NET: 404 mL      CAPILLARY BLOOD GLUCOSE      POCT Blood Glucose.: 145 mg/dL (04 Feb 2021 06:35)      HOSPITAL MEDICATIONS:  MEDICATIONS  (STANDING):  ALBUTerol    90 MICROgram(s) HFA Inhaler 2 Puff(s) Inhalation every 6 hours  amLODIPine   Tablet 5 milliGRAM(s) Oral daily  ascorbic acid 500 milliGRAM(s) Oral daily  cefTAZidime  IVPB 2 Gram(s) IV Intermittent every 8 hours  chlorhexidine 4% Liquid 1 Application(s) Topical daily  collagenase Ointment 1 Application(s) Topical two times a day  dextrose 40% Gel 15 Gram(s) Oral once  dextrose 5%. 1000 milliLiter(s) (50 mL/Hr) IV Continuous <Continuous>  dextrose 5%. 1000 milliLiter(s) (100 mL/Hr) IV Continuous <Continuous>  dextrose 50% Injectable 25 Gram(s) IV Push once  dextrose 50% Injectable 12.5 Gram(s) IV Push once  dextrose 50% Injectable 25 Gram(s) IV Push once  enoxaparin Injectable 100 milliGRAM(s) SubCutaneous every 12 hours  glucagon  Injectable 1 milliGRAM(s) IntraMuscular once  insulin lispro (ADMELOG) corrective regimen sliding scale   SubCutaneous every 6 hours  lactobacillus acidophilus 1 Tablet(s) Oral two times a day  metoprolol tartrate 50 milliGRAM(s) Oral two times a day  multivitamin/minerals/iron Oral Solution (CENTRUM) 15 milliLiter(s) Oral daily  pancrelipase (VIOKACE 20,880 Lipase Units) 1 Tablet(s) Oral once  pantoprazole   Suspension 40 milliGRAM(s) Oral daily  tobramycin for Nebulization 300 milliGRAM(s) Inhalation every 12 hours  vancomycin    Solution 125 milliGRAM(s) Oral every 6 hours  zinc sulfate 220 milliGRAM(s) Oral daily    MEDICATIONS  (PRN):  acetaminophen    Suspension .. 650 milliGRAM(s) Oral every 6 hours PRN Temp greater or equal to 38C (100.4F), Mild Pain (1 - 3), Moderate Pain (4 - 6)  artificial tears (preservative free) Ophthalmic Solution 1 Drop(s) Both EYES every 8 hours PRN Dry Eyes  oxyCODONE    Solution 5 milliGRAM(s) Oral every 4 hours PRN Severe Pain (7 - 10)      LABS:                    MICROBIOLOGY:     RADIOLOGY:  [ ] Reviewed and interpreted by me    PULMONARY FUNCTION TESTS:    EKG:

## 2021-02-04 NOTE — PROGRESS NOTE ADULT - ASSESSMENT
61 YO F with PMHx of HTN, DVT on Xarelto, Peritonitis s/p Oral's Procedure and Ileostomy (reversed in 2011) and AARON who presented to [facility] on 11/18 for AHRF second to COVID 19, intubated on 11/23 complicated by R lung abscesses on CT CHEST 12/5, multibacterial PNA and bacteremia, ARTEMIO s/p CRRT and HD, and s/p Tracheostomy 12/18 c/b track leak and s/p OR 8Bivona. Transferred to [facility] for furhter management.     # AMS   - AOX3 at baseline.   - CTH and EEG WNL   - Now weaned off all sedation.   - Monitor mentation and supportive care     # ARDS second to COVID vs Multi-bacterial PNA/ R Lung Abscess  - Intubated 11/23 and course complicated with multi-bacterial PNA and lung abscesses.   - Lung abscess presented on CT CHEST 12/5  - s/p Tracheostomy on 12/18 by CTSx. Sutures out 1/1/2021  - Air leak noted and s/p Trach Exchange for Bivona with CTSx 12/31  - Currently on FULL Vent. PS 15/5/30 as tolerated.   - Proventil, IPV and Chest PT Q6H and Suction PRN. Trach Care QD.     # Vasoplegic vs Septic Shock   - Weaned off pressors. Monitor BP on Norvasc and Metoprolol.   - On full AC for Hx of DVT. CTA CHEST with no PEs.     # ARTEMIO s/p CRRT and HD   - ARTEMIO now resolved and no longer required HD.   - Monitor renal function and avoid nephrotoxins.     # Dysphagia   - Initially CTSx and GI deferred PEG given Ventral Hernia   - s/p PEG placement with IR 1/11   - Continue on TF and monitor tolerance.   - Monitor diarrhea as CDIFF (+). Hold laxatives.     # Sepsis second to COVID vs Multi-bacterial PNA/ Bacteremia and R Lung Abscess vs CDIFF (+)   - COVID with superimposed multi-bacterial VAP vs aspiration PNA vs cavitary PNA.   - SCx (11/24) MSSA and BCx (11/27) with MSSA and Proteus Bacteremia  - SCx (12/1, 12/12 and 12/27) with Stenotrophomonas.   - SCx (1/9) with  Pseudomonas and Stenotrophomonas   - BCX has been persistently negative from 12/1, and most recently 1/12   - CT CHEST 12/4 with right lung cavitation.   - s/p multiple antibiotics, but now completed Fortaz and Flagyl (1/12-1/16)  - RPT CT CHEST 1/28 with improvement of right lung abscess   - SCx with  Pseudomonas and Stenotrophomonas noted and started on dual pseudomonal coverage (1/28-2/3)    - CDIFF (+) and started on PO Vancomycin (2/1 - 2/10)     # DM2   - Continue on ISS and NPH and monitor FS.   - Adjust insulin as needed.     # Hx of DVT   - Continue on Lovenox FULL DOSE for Xarelto for now.     # Skin/ Facial Wounds   - WOC recommendations appreciated   - Plastics evaluation and recommendations of facial wound appreciated     # DVT/ GI PPX with FULL AC/ PPI   # CODE STATUS - FULL CODE   # DISPO - PT recc Rehab/ Vent Facility    61 YO F with PMHx of HTN, DVT on Xarelto, Peritonitis s/p Oral's Procedure and Ileostomy (reversed in 2011) and AARON who presented to [facility] on 11/18 for AHRF second to COVID 19, intubated on 11/23 complicated by R lung abscesses on CT CHEST 12/5, multibacterial PNA and bacteremia, ARTEMIO s/p CRRT and HD, and s/p Tracheostomy 12/18 c/b track leak and s/p OR 8Bivona. Transferred to [facility] for furhter management.     # AMS   - AOX3 at baseline.   - CTH and EEG WNL   - Now weaned off all sedation.   - Monitor mentation and supportive care     # ARDS second to COVID vs Multi-bacterial PNA/ R Lung Abscess  - Intubated 11/23 and course complicated with multi-bacterial PNA and lung abscesses.   - Lung abscess presented on CT CHEST 12/5  - s/p Tracheostomy on 12/18 by CTSx. Sutures out 1/1/2021  - Air leak noted and s/p Trach Exchange for Bivona with CTSx 12/31  - Currently on FULL Vent. PS 15/5/30 as tolerated.   - Proventil, IPV and Chest PT Q6H and Suction PRN. Trach Care QD.     # Vasoplegic vs Septic Shock   - Weaned off pressors. Monitor BP on Norvasc and Metoprolol.   - On full AC for Hx of DVT. CTA CHEST with no PEs.     # ARTEMIO s/p CRRT and HD   - ARTEMIO now resolved and no longer required HD.   - Monitor renal function and avoid nephrotoxins.    # C-Diff   -Isolation precautions   Vanco po started   Less stools      # Dysphagia   - Initially CTSx and GI deferred PEG given Ventral Hernia   - s/p PEG placement with IR 1/11   - Continue on TF and monitor tolerance.   - Monitor diarrhea as CDIFF (+). Hold laxatives.     # Sepsis second to COVID vs Multi-bacterial PNA/ Bacteremia and R Lung Abscess vs CDIFF (+)   - COVID with superimposed multi-bacterial VAP vs aspiration PNA vs cavitary PNA.   - SCx (11/24) MSSA and BCx (11/27) with MSSA and Proteus Bacteremia  - SCx (12/1, 12/12 and 12/27) with Stenotrophomonas.   - SCx (1/9) with  Pseudomonas and Stenotrophomonas   - BCX has been persistently negative from 12/1, and most recently 1/12   - CT CHEST 12/4 with right lung cavitation.   - s/p multiple antibiotics, but now completed Fortaz and Flagyl (1/12-1/16)  - RPT CT CHEST 1/28 with improvement of right lung abscess   - SCx with  Pseudomonas and Stenotrophomonas noted and started on dual pseudomonal coverage (1/28-2/3)    - CDIFF (+) and started on PO Vancomycin (2/1 - 2/10)     # DM2   - Continue on ISS and NPH and monitor FS.   - Adjust insulin as needed.     # Hx of DVT   - Continue on Lovenox FULL DOSE for Xarelto for now.     # Skin/ Facial Wounds   - WOC recommendations appreciated   - Plastics evaluation and recommendations of facial wound appreciated     # DVT/ GI PPX with FULL AC/ PPI   # CODE STATUS - FULL CODE   # DISPO - PT recc Rehab/ Vent Facility

## 2021-02-04 NOTE — PROGRESS NOTE ADULT - ATTENDING COMMENTS
Tolerating PS.  Follow up with ID regarding dosing of Danial/Fortaz.  Continue PO vancomycin for C. Diff.

## 2021-02-05 LAB
GLUCOSE BLDC GLUCOMTR-MCNC: 129 MG/DL — HIGH (ref 70–99)
GLUCOSE BLDC GLUCOMTR-MCNC: 131 MG/DL — HIGH (ref 70–99)
GLUCOSE BLDC GLUCOMTR-MCNC: 135 MG/DL — HIGH (ref 70–99)
GLUCOSE BLDC GLUCOMTR-MCNC: 144 MG/DL — HIGH (ref 70–99)
GLUCOSE BLDC GLUCOMTR-MCNC: 150 MG/DL — HIGH (ref 70–99)

## 2021-02-05 PROCEDURE — 99233 SBSQ HOSP IP/OBS HIGH 50: CPT

## 2021-02-05 PROCEDURE — 99232 SBSQ HOSP IP/OBS MODERATE 35: CPT

## 2021-02-05 RX ADMIN — Medication 1 TABLET(S): at 17:44

## 2021-02-05 RX ADMIN — AMLODIPINE BESYLATE 5 MILLIGRAM(S): 2.5 TABLET ORAL at 04:53

## 2021-02-05 RX ADMIN — PANTOPRAZOLE SODIUM 40 MILLIGRAM(S): 20 TABLET, DELAYED RELEASE ORAL at 11:56

## 2021-02-05 RX ADMIN — ALBUTEROL 2 PUFF(S): 90 AEROSOL, METERED ORAL at 21:38

## 2021-02-05 RX ADMIN — Medication 300 MILLIGRAM(S): at 21:39

## 2021-02-05 RX ADMIN — CEFTAZIDIME 100 GRAM(S): 6 INJECTION, POWDER, FOR SOLUTION INTRAVENOUS at 04:52

## 2021-02-05 RX ADMIN — Medication 125 MILLIGRAM(S): at 04:53

## 2021-02-05 RX ADMIN — ENOXAPARIN SODIUM 100 MILLIGRAM(S): 100 INJECTION SUBCUTANEOUS at 04:53

## 2021-02-05 RX ADMIN — OXYCODONE HYDROCHLORIDE 5 MILLIGRAM(S): 5 TABLET ORAL at 04:54

## 2021-02-05 RX ADMIN — Medication 300 MILLIGRAM(S): at 16:14

## 2021-02-05 RX ADMIN — ALBUTEROL 2 PUFF(S): 90 AEROSOL, METERED ORAL at 16:22

## 2021-02-05 RX ADMIN — Medication 125 MILLIGRAM(S): at 17:44

## 2021-02-05 RX ADMIN — Medication 1 APPLICATION(S): at 04:53

## 2021-02-05 RX ADMIN — Medication 125 MILLIGRAM(S): at 22:14

## 2021-02-05 RX ADMIN — Medication 50 MILLIGRAM(S): at 04:53

## 2021-02-05 RX ADMIN — ENOXAPARIN SODIUM 100 MILLIGRAM(S): 100 INJECTION SUBCUTANEOUS at 17:45

## 2021-02-05 RX ADMIN — ALBUTEROL 2 PUFF(S): 90 AEROSOL, METERED ORAL at 04:12

## 2021-02-05 RX ADMIN — Medication 1 TABLET(S): at 04:54

## 2021-02-05 RX ADMIN — CEFTAZIDIME 100 GRAM(S): 6 INJECTION, POWDER, FOR SOLUTION INTRAVENOUS at 22:14

## 2021-02-05 RX ADMIN — OXYCODONE HYDROCHLORIDE 5 MILLIGRAM(S): 5 TABLET ORAL at 00:20

## 2021-02-05 RX ADMIN — CHLORHEXIDINE GLUCONATE 1 APPLICATION(S): 213 SOLUTION TOPICAL at 11:56

## 2021-02-05 RX ADMIN — CEFTAZIDIME 100 GRAM(S): 6 INJECTION, POWDER, FOR SOLUTION INTRAVENOUS at 15:23

## 2021-02-05 RX ADMIN — Medication 50 MILLIGRAM(S): at 17:44

## 2021-02-05 RX ADMIN — ZINC SULFATE TAB 220 MG (50 MG ZINC EQUIVALENT) 220 MILLIGRAM(S): 220 (50 ZN) TAB at 11:56

## 2021-02-05 RX ADMIN — Medication 500 MILLIGRAM(S): at 11:56

## 2021-02-05 RX ADMIN — Medication 15 MILLILITER(S): at 14:08

## 2021-02-05 RX ADMIN — Medication 1 APPLICATION(S): at 17:45

## 2021-02-05 RX ADMIN — Medication 125 MILLIGRAM(S): at 00:15

## 2021-02-05 RX ADMIN — ALBUTEROL 2 PUFF(S): 90 AEROSOL, METERED ORAL at 11:45

## 2021-02-05 RX ADMIN — Medication 125 MILLIGRAM(S): at 11:56

## 2021-02-05 NOTE — PROGRESS NOTE ADULT - SUBJECTIVE AND OBJECTIVE BOX
Follow Up:      Inverval History/ROS:Patient is a 60y old  Female who presents with a chief complaint of Transfer from [facility] (22 Dec 2020 08:13)    decreased respiratory secretions.   afebrile.        Allergies    Kiwi (Unknown)  latex (Anaphylaxis)  latex (Unknown)  penicillin (Other)  penicillin (Unknown)  perfume  hives (Other)  potassium acetate (Other)  soap additives hives (Other)    Intolerances        ANTIMICROBIALS: cefTAZidime  IVPB 2 every 8 hours  tobramycin for Nebulization 300 every 12 hours        MEDICATIONS  (STANDING):  acetaminophen    Suspension .. 650 every 6 hours PRN  amLODIPine   Tablet 5 daily  enoxaparin Injectable 100 every 12 hours  insulin lispro (ADMELOG) corrective regimen sliding scale  every 6 hours  metoprolol tartrate 50 two times a day  metoprolol tartrate Injectable 5 every 6 hours  pantoprazole  Injectable 40 daily  psyllium Powder 1 two times a day    Vital Signs Last 24 Hrs  T(F): 97.3 (02-05-21 @ 14:07), Max: 98.4 (02-04-21 @ 17:58)  HR: 100 (02-05-21 @ 14:07)  BP: 143/63 (02-05-21 @ 14:07)  RR: 20 (02-05-21 @ 14:07)  SpO2: 98% (02-05-21 @ 14:07) (98% - 100%)    PHYSICAL EXAM:  General: [x ] trach,  communicative  ENT:  eschar chin healing  Cardiovascular:   distant  Respiratory:  coarse   GI:  [x ] soft, scars, hernia, gastrostomy tube   :  [ ] negro   Musculoskeletal: wiggles toes, moves arms  Neurologic: moves all extremities  Skin:   no rash  Psychiatric: alert, awake  Lines: arrow both arms                                     10.4   8.19  )-----------( 517      ( 04 Feb 2021 13:44 )             36.7 02-04    137  |  99  |  6   ----------------------------<  157  3.8   |  25  |  0.21  Ca    9.3      04 Feb 2021 13:44Phos  3.0     02-04Mg     1.6     02-04          MICROBIOLOGY:  1/25 blood culture x 2 no growth  1/25 sputum   culCulture - Sputum . (01.25.21 @ 20:52)   - Amikacin: S <=16   - Aztreonam: R >16   - Cefepime: S 8   - Ceftazidime: S 8   - Ciprofloxacin: S 0.5   - Gentamicin: S <=2   - Imipenem: R >8   - Levofloxacin: S 2   - Meropenem: R >8   - Piperacillin/Tazobactam: I 32   - Tobramycin: S 4   Gram Stain:   Few polymorphonuclear leukocytes per low power field   Rare Squamous epithelial cells per low power field   Numerous Gram Negative Rods per oil power field   Specimen Source: .Sputum Sputum   Culture Results:   Numerous Pseudomonas aeruginosa (Carbapenem Resistant)   Numerous Stenotrophomonas maltophilia   Normal Respiratory Jocelynn absent   Organism Identification: Pseudomonas aeruginosa (Carbapenem Resistant)   Organism: Pseudomonas aeruginosa (Carbapenem Resistant)   Method Type: LONG   ulCOVID-19 PCR . (01.24.21 @ 15:01)   COVID-19 PCR: NotDetec: Culture - Blood (01.12.21 @ 18:55)   Specimen Source: .Blood Blood-Venous   Culture Results:   No Growth Final Culture - Blood (01.12.21 @ 18:50)   Specimen Source: .Blood Blood-Peripheral   Culture Results:   No Growth Final     1/12 blood cultures x 2 no growth to date    cuCulture - Sputum . (01.09.21 @ 18:30)   - Amikacin: S <=16   - Aztreonam: I 16   - Cefepime: S 8   - Ceftazidime: S 4   - Ceftazidime: S 4   - Ciprofloxacin: S 0.5   - Gentamicin: S <=2   - Imipenem: R >8   - Levofloxacin: S 2   - Levofloxacin: S <=0.5   - Meropenem: R >8   - Piperacillin/Tazobactam: S 16   - Tobramycin: S <=2   - Trimethoprim/Sulfamethoxazole: S <=0.5/9.5   Gram Stain:   Few polymorphonuclear leukocytes per low power field   No Squamous epithelial cells per low power field   Numerous Gram Negative Rods seen per oil power field   Specimen Source: .Sputum Sputum   Culture Results:   Numerous Pseudomonas aeruginosa (Carbapenem Resistant)   Numerous Stenotrophomonas maltophilia   Normal Respiratory Jocelynn absent   Organism Identification: Pseudomonas aeruginosa (Carbapenem Resistant)   Stenotrophomonas maltophilia     RADIOLOGY:    none recnet

## 2021-02-05 NOTE — PROGRESS NOTE ADULT - SUBJECTIVE AND OBJECTIVE BOX
CHIEF COMPLAINT:    Interval Events:    REVIEW OF SYSTEMS:  Constitutional:   Eyes:  ENT:  CV:  Resp:  GI:  :  MSK:  Integumentary:  Neurological:  Psychiatric:  Endocrine:  Hematologic/Lymphatic:  Allergic/Immunologic:  [ ] All other systems negative  [ ] Unable to assess ROS because ________    OBJECTIVE:  ICU Vital Signs Last 24 Hrs  T(C): 36.4 (05 Feb 2021 04:47), Max: 36.9 (04 Feb 2021 17:58)  T(F): 97.6 (05 Feb 2021 04:47), Max: 98.4 (04 Feb 2021 17:58)  HR: 93 (05 Feb 2021 04:47) (80 - 103)  BP: 154/61 (05 Feb 2021 04:47) (147/60 - 174/63)  BP(mean): --  ABP: --  ABP(mean): --  RR: 21 (05 Feb 2021 04:47) (21 - 25)  SpO2: 100% (05 Feb 2021 04:47) (99% - 100%)    Mode: AC/ CMV (Assist Control/ Continuous Mandatory Ventilation), RR (machine): 12, TV (machine): 450, FiO2: 40, PEEP: 5, MAP: 13, PIP: 27    02-04 @ 07:01  -  02-05 @ 07:00  --------------------------------------------------------  IN: 1304 mL / OUT: 400 mL / NET: 904 mL      CAPILLARY BLOOD GLUCOSE      POCT Blood Glucose.: 150 mg/dL (05 Feb 2021 05:29)      PHYSICAL EXAM:  General:   HEENT:   Lymph Nodes:  Neck:   Respiratory:   Cardiovascular:   Abdomen:   Extremities:   Skin:   Neurological:  Psychiatry:    HOSPITAL MEDICATIONS:  MEDICATIONS  (STANDING):  ALBUTerol    90 MICROgram(s) HFA Inhaler 2 Puff(s) Inhalation every 6 hours  amLODIPine   Tablet 5 milliGRAM(s) Oral daily  ascorbic acid 500 milliGRAM(s) Oral daily  cefTAZidime  IVPB 2 Gram(s) IV Intermittent every 8 hours  chlorhexidine 4% Liquid 1 Application(s) Topical daily  collagenase Ointment 1 Application(s) Topical two times a day  dextrose 40% Gel 15 Gram(s) Oral once  dextrose 5%. 1000 milliLiter(s) (50 mL/Hr) IV Continuous <Continuous>  dextrose 5%. 1000 milliLiter(s) (100 mL/Hr) IV Continuous <Continuous>  dextrose 50% Injectable 25 Gram(s) IV Push once  dextrose 50% Injectable 12.5 Gram(s) IV Push once  dextrose 50% Injectable 25 Gram(s) IV Push once  enoxaparin Injectable 100 milliGRAM(s) SubCutaneous every 12 hours  glucagon  Injectable 1 milliGRAM(s) IntraMuscular once  insulin lispro (ADMELOG) corrective regimen sliding scale   SubCutaneous every 6 hours  lactobacillus acidophilus 1 Tablet(s) Oral two times a day  metoprolol tartrate 50 milliGRAM(s) Oral two times a day  multivitamin/minerals/iron Oral Solution (CENTRUM) 15 milliLiter(s) Oral daily  pancrelipase (VIOKACE 20,880 Lipase Units) 1 Tablet(s) Oral once  pantoprazole   Suspension 40 milliGRAM(s) Oral daily  tobramycin for Nebulization 300 milliGRAM(s) Inhalation every 12 hours  vancomycin    Solution 125 milliGRAM(s) Oral every 6 hours  zinc sulfate 220 milliGRAM(s) Oral daily    MEDICATIONS  (PRN):  acetaminophen    Suspension .. 650 milliGRAM(s) Oral every 6 hours PRN Temp greater or equal to 38C (100.4F), Mild Pain (1 - 3), Moderate Pain (4 - 6)  artificial tears (preservative free) Ophthalmic Solution 1 Drop(s) Both EYES every 8 hours PRN Dry Eyes  oxyCODONE    Solution 5 milliGRAM(s) Oral every 4 hours PRN Severe Pain (7 - 10)      LABS:                        10.4   8.19  )-----------( 517      ( 04 Feb 2021 13:44 )             36.7     02-04    137  |  99  |  6<L>  ----------------------------<  157<H>  3.8   |  25  |  0.21<L>    Ca    9.3      04 Feb 2021 13:44  Phos  3.0     02-04  Mg     1.6     02-04                MICROBIOLOGY:     RADIOLOGY:  [ ] Reviewed and interpreted by me    PULMONARY FUNCTION TESTS:    EKG: CHIEF COMPLAINT: Patient is a 60y old  Female who presents with a chief complaint of Transfer from [facility] (05 Feb 2021 15:48)      Interval Events: Pt seen and evaluated by team     REVIEW OF SYSTEMS:  Constitutional: Pt with intermittent pain relieved with pain medication prn   CV: Denies   Resp: Intermittent MCINTOSH  GI: Denies   MSK: Denies   [x ] All other systems negative    OBJECTIVE:  ICU Vital Signs Last 24 Hrs  T(C): 36.4 (05 Feb 2021 04:47), Max: 36.9 (04 Feb 2021 17:58)  T(F): 97.6 (05 Feb 2021 04:47), Max: 98.4 (04 Feb 2021 17:58)  HR: 93 (05 Feb 2021 04:47) (80 - 103)  BP: 154/61 (05 Feb 2021 04:47) (147/60 - 174/63)  BP(mean): --  ABP: --  ABP(mean): --  RR: 21 (05 Feb 2021 04:47) (21 - 25)  SpO2: 100% (05 Feb 2021 04:47) (99% - 100%)    Mode: AC/ CMV (Assist Control/ Continuous Mandatory Ventilation), RR (machine): 12, TV (machine): 450, FiO2: 40, PEEP: 5, MAP: 13, PIP: 27    02-04 @ 07:01  -  02-05 @ 07:00  --------------------------------------------------------  IN: 1304 mL / OUT: 400 mL / NET: 904 mL      CAPILLARY BLOOD GLUCOSE      POCT Blood Glucose.: 150 mg/dL (05 Feb 2021 05:29)      HOSPITAL MEDICATIONS:  MEDICATIONS  (STANDING):  ALBUTerol    90 MICROgram(s) HFA Inhaler 2 Puff(s) Inhalation every 6 hours  amLODIPine   Tablet 5 milliGRAM(s) Oral daily  ascorbic acid 500 milliGRAM(s) Oral daily  cefTAZidime  IVPB 2 Gram(s) IV Intermittent every 8 hours  chlorhexidine 4% Liquid 1 Application(s) Topical daily  collagenase Ointment 1 Application(s) Topical two times a day  dextrose 40% Gel 15 Gram(s) Oral once  dextrose 5%. 1000 milliLiter(s) (50 mL/Hr) IV Continuous <Continuous>  dextrose 5%. 1000 milliLiter(s) (100 mL/Hr) IV Continuous <Continuous>  dextrose 50% Injectable 25 Gram(s) IV Push once  dextrose 50% Injectable 12.5 Gram(s) IV Push once  dextrose 50% Injectable 25 Gram(s) IV Push once  enoxaparin Injectable 100 milliGRAM(s) SubCutaneous every 12 hours  glucagon  Injectable 1 milliGRAM(s) IntraMuscular once  insulin lispro (ADMELOG) corrective regimen sliding scale   SubCutaneous every 6 hours  lactobacillus acidophilus 1 Tablet(s) Oral two times a day  metoprolol tartrate 50 milliGRAM(s) Oral two times a day  multivitamin/minerals/iron Oral Solution (CENTRUM) 15 milliLiter(s) Oral daily  pancrelipase (VIOKACE 20,880 Lipase Units) 1 Tablet(s) Oral once  pantoprazole   Suspension 40 milliGRAM(s) Oral daily  tobramycin for Nebulization 300 milliGRAM(s) Inhalation every 12 hours  vancomycin    Solution 125 milliGRAM(s) Oral every 6 hours  zinc sulfate 220 milliGRAM(s) Oral daily    MEDICATIONS  (PRN):  acetaminophen    Suspension .. 650 milliGRAM(s) Oral every 6 hours PRN Temp greater or equal to 38C (100.4F), Mild Pain (1 - 3), Moderate Pain (4 - 6)  artificial tears (preservative free) Ophthalmic Solution 1 Drop(s) Both EYES every 8 hours PRN Dry Eyes  oxyCODONE    Solution 5 milliGRAM(s) Oral every 4 hours PRN Severe Pain (7 - 10)      LABS:                        10.4   8.19  )-----------( 517      ( 04 Feb 2021 13:44 )             36.7     02-04    137  |  99  |  6<L>  ----------------------------<  157<H>  3.8   |  25  |  0.21<L>    Ca    9.3      04 Feb 2021 13:44  Phos  3.0     02-04  Mg     1.6     02-04                MICROBIOLOGY:     RADIOLOGY:  [ ] Reviewed and interpreted by me    PULMONARY FUNCTION TESTS:    EKG:

## 2021-02-05 NOTE — PROGRESS NOTE ADULT - ATTENDING COMMENTS
Tolerating PS. Continue weaning trials.  Follow up with ID regarding dosing of Danial/Fortaz.  Continue PO vancomycin for C. Diff.

## 2021-02-05 NOTE — PROGRESS NOTE ADULT - ASSESSMENT
59 YO F with PMHx of HTN, DVT on Xarelto, Peritonitis s/p Oral's Procedure and Ileostomy (reversed in 2011) and AARON who presented to [facility] on 11/18 for AHRF second to COVID 19, intubated on 11/23 complicated by R lung abscesses on CT CHEST 12/5, multibacterial PNA and bacteremia, ARTEMIO s/p CRRT and HD, and s/p Tracheostomy 12/18 c/b track leak and s/p OR 8Bivona. Transferred to [facility] for furhter management.     # AMS   - AOX3 at baseline.   - CTH and EEG WNL   - Now weaned off all sedation.   - Monitor mentation and supportive care     # ARDS second to COVID vs Multi-bacterial PNA/ R Lung Abscess  - Intubated 11/23 and course complicated with multi-bacterial PNA and lung abscesses.   - Lung abscess presented on CT CHEST 12/5  - s/p Tracheostomy on 12/18 by CTSx. Sutures out 1/1/2021  - Air leak noted and s/p Trach Exchange for Bivona with CTSx 12/31  - Currently on FULL Vent. PS 15/5/30 as tolerated.   - Proventil, IPV and Chest PT Q6H and Suction PRN. Trach Care QD.     # Vasoplegic vs Septic Shock   - Weaned off pressors. Monitor BP on Norvasc and Metoprolol.   - On full AC for Hx of DVT. CTA CHEST with no PEs.     # ARTEMIO s/p CRRT and HD   - ARTEMIO now resolved and no longer required HD.   - Monitor renal function and avoid nephrotoxins.    # C-Diff   -Isolation precautions   Vanco po started   Less stools      # Dysphagia   - Initially CTSx and GI deferred PEG given Ventral Hernia   - s/p PEG placement with IR 1/11   - Continue on TF and monitor tolerance.   - Monitor diarrhea as CDIFF (+). Hold laxatives.     # Sepsis second to COVID vs Multi-bacterial PNA/ Bacteremia and R Lung Abscess vs CDIFF (+)   - COVID with superimposed multi-bacterial VAP vs aspiration PNA vs cavitary PNA.   - SCx (11/24) MSSA and BCx (11/27) with MSSA and Proteus Bacteremia  - SCx (12/1, 12/12 and 12/27) with Stenotrophomonas.   - SCx (1/9) with  Pseudomonas and Stenotrophomonas   - BCX has been persistently negative from 12/1, and most recently 1/12   - CT CHEST 12/4 with right lung cavitation.   - s/p multiple antibiotics, but now completed Fortaz and Flagyl (1/12-1/16)  - RPT CT CHEST 1/28 with improvement of right lung abscess   - SCx with  Pseudomonas and Stenotrophomonas noted and started on dual pseudomonal coverage (1/28-2/3)    - CDIFF (+) and started on PO Vancomycin (2/1 - 2/10)     # DM2   - Continue on ISS and NPH and monitor FS.   - Adjust insulin as needed.     # Hx of DVT   - Continue on Lovenox FULL DOSE for Xarelto for now.     # Skin/ Facial Wounds   - WOC recommendations appreciated   - Plastics evaluation and recommendations of facial wound appreciated     # DVT/ GI PPX with FULL AC/ PPI   # CODE STATUS - FULL CODE   # DISPO - PT recc Rehab/ Vent Facility    59 YO F with PMHx of HTN, DVT on Xarelto, Peritonitis s/p Oral's Procedure and Ileostomy (reversed in 2011) and AARON who presented to [facility] on 11/18 for AHRF second to COVID 19, intubated on 11/23 complicated by R lung abscesses on CT CHEST 12/5, multibacterial PNA and bacteremia, ARTEMIO s/p CRRT and HD, and s/p Tracheostomy 12/18 c/b track leak and s/p OR 8Bivona. Transferred to [facility] for furhter management.     # AMS   - AOX3 at baseline.   - CTH and EEG WNL   - Now weaned off all sedation.   - Monitor mentation and supportive care     # ARDS second to COVID vs Multi-bacterial PNA/ R Lung Abscess  - Intubated 11/23 and course complicated with multi-bacterial PNA and lung abscesses.   - Lung abscess presented on CT CHEST 12/5  - s/p Tracheostomy on 12/18 by CTSx. Sutures out 1/1/2021  - Air leak noted and s/p Trach Exchange for Bivona with CTSx 12/31  - Currently on FULL Vent. PS 15/5/30 as tolerated.   - Proventil, IPV and Chest PT Q6H and Suction PRN. Trach Care QD.   - Last day of abx 2/6 -10 day course     # Vasoplegic vs Septic Shock   - Weaned off pressors. Monitor BP on Norvasc and Metoprolol.   - On full AC for Hx of DVT. CTA CHEST with no PEs.     # ARTEMIO s/p CRRT and HD   - ARTEMIO now resolved and no longer required HD.   - Monitor renal function and avoid nephrotoxins.    # C-Diff   -Isolation precautions   Vanco po started   Less stools      # Dysphagia   - Initially CTSx and GI deferred PEG given Ventral Hernia   - s/p PEG placement with IR 1/11   - Continue on TF and monitor tolerance.   - Monitor diarrhea as CDIFF (+). Hold laxatives.     # Sepsis second to COVID vs Multi-bacterial PNA/ Bacteremia and R Lung Abscess vs CDIFF (+)   - COVID with superimposed multi-bacterial VAP vs aspiration PNA vs cavitary PNA.   - SCx (11/24) MSSA and BCx (11/27) with MSSA and Proteus Bacteremia  - SCx (12/1, 12/12 and 12/27) with Stenotrophomonas.   - SCx (1/9) with  Pseudomonas and Stenotrophomonas   - BCX has been persistently negative from 12/1, and most recently 1/12   - CT CHEST 12/4 with right lung cavitation.   - s/p multiple antibiotics, but now completed Fortaz and Flagyl (1/12-1/16)  - RPT CT CHEST 1/28 with improvement of right lung abscess   - SCx with  Pseudomonas and Stenotrophomonas noted and started on dual pseudomonal coverage (1/28-2/3)    - CDIFF (+) and started on PO Vancomycin (2/1 - 2/10)     # DM2   - Continue on ISS and NPH and monitor FS.   - Adjust insulin as needed.     # Hx of DVT   - Continue on Lovenox FULL DOSE for Xarelto for now.     # Skin/ Facial Wounds   - WOC recommendations appreciated   - Plastics evaluation and recommendations of facial wound appreciated     # DVT/ GI PPX with FULL AC/ PPI   # CODE STATUS - FULL CODE   # DISPO - PT recc Rehab/ Vent Facility

## 2021-02-05 NOTE — PROGRESS NOTE ADULT - ASSESSMENT
61 yo woman with AARON, h/o Lockett's procedure, s/p reversal 2011 who was admitted to [facility] 11/17 with covid pneumonia.  She received course of remdesevir and dexamethasone; required intubation 11/23 for acute hypoxemic respiratory failure. Hospital course complicated by Proteus bacteremia, MSSA bacteremia/ pneumonia, and Stenotrophomonas respiratory infection, as well as ATN requiring CVVHD --> HD ---> off dialysis.  CT chest 12/4 new large consolidation with cavitation in right lung.  Blood culture 11/27 MSSA and proteus; 11/28 MSSA.     Blood cultures 12/1, 12/11, 12/21, 12/27 no growth.  Sputum culture 12/1, 12/12, 12/27, 12/29, 1/9 Stenotrophomonas.    Multifocal covid pneumonia with respiratory failure, MSSA bacteremia 11/27 with cavitary pneumonia, Proteus bacteremia 11/27, Stenotrophomonas pneumonia.  s/p prolonged antibiotics for bacteremia, pneumonia, aspiration.  s/p trach 12/18.  12/29 Fever, blood tinged sputum and Stenotrophomonas in sputum.  meropenem changed to ceftazidime  --> 1/5 12/31 trach leak.  s/p OR 12/31 for trach change with CT surgery.     1/11 IR gastrostomy placement   1/12 fever pseudomonas and Stenotrophomonas in sputum. received course of ceftazidime, flagyl, vanco.    1/25 fever 102 increased pulmonary secretions with CRE pseudomonas and steno in sputum    1/28 CT chest decrease size lung abscesses     day # 9/10 ceftazidime and inhaled tobra    would complete antibiotics tomorrow 2/6   continue contact precautions for CRE     will d/c f/u  please call with any questions    Mila Burgos MD  Pager: [phone]  After 5 PM or weekends please call fellow on call or office [phone]

## 2021-02-06 LAB
GLUCOSE BLDC GLUCOMTR-MCNC: 131 MG/DL — HIGH (ref 70–99)
GLUCOSE BLDC GLUCOMTR-MCNC: 144 MG/DL — HIGH (ref 70–99)
GLUCOSE BLDC GLUCOMTR-MCNC: 155 MG/DL — HIGH (ref 70–99)

## 2021-02-06 PROCEDURE — 99232 SBSQ HOSP IP/OBS MODERATE 35: CPT

## 2021-02-06 RX ADMIN — Medication 125 MILLIGRAM(S): at 18:01

## 2021-02-06 RX ADMIN — PANTOPRAZOLE SODIUM 40 MILLIGRAM(S): 20 TABLET, DELAYED RELEASE ORAL at 11:46

## 2021-02-06 RX ADMIN — ALBUTEROL 2 PUFF(S): 90 AEROSOL, METERED ORAL at 16:30

## 2021-02-06 RX ADMIN — Medication 50 MILLIGRAM(S): at 05:42

## 2021-02-06 RX ADMIN — ALBUTEROL 2 PUFF(S): 90 AEROSOL, METERED ORAL at 04:34

## 2021-02-06 RX ADMIN — ALBUTEROL 2 PUFF(S): 90 AEROSOL, METERED ORAL at 11:39

## 2021-02-06 RX ADMIN — ENOXAPARIN SODIUM 100 MILLIGRAM(S): 100 INJECTION SUBCUTANEOUS at 05:42

## 2021-02-06 RX ADMIN — ZINC SULFATE TAB 220 MG (50 MG ZINC EQUIVALENT) 220 MILLIGRAM(S): 220 (50 ZN) TAB at 11:46

## 2021-02-06 RX ADMIN — ENOXAPARIN SODIUM 100 MILLIGRAM(S): 100 INJECTION SUBCUTANEOUS at 18:01

## 2021-02-06 RX ADMIN — Medication 1 APPLICATION(S): at 05:43

## 2021-02-06 RX ADMIN — Medication 125 MILLIGRAM(S): at 11:46

## 2021-02-06 RX ADMIN — Medication 125 MILLIGRAM(S): at 05:42

## 2021-02-06 RX ADMIN — CEFTAZIDIME 100 GRAM(S): 6 INJECTION, POWDER, FOR SOLUTION INTRAVENOUS at 22:24

## 2021-02-06 RX ADMIN — CEFTAZIDIME 100 GRAM(S): 6 INJECTION, POWDER, FOR SOLUTION INTRAVENOUS at 13:38

## 2021-02-06 RX ADMIN — Medication 300 MILLIGRAM(S): at 20:49

## 2021-02-06 RX ADMIN — CEFTAZIDIME 100 GRAM(S): 6 INJECTION, POWDER, FOR SOLUTION INTRAVENOUS at 05:43

## 2021-02-06 RX ADMIN — Medication 125 MILLIGRAM(S): at 22:25

## 2021-02-06 RX ADMIN — CHLORHEXIDINE GLUCONATE 1 APPLICATION(S): 213 SOLUTION TOPICAL at 11:46

## 2021-02-06 RX ADMIN — Medication 1 TABLET(S): at 18:01

## 2021-02-06 RX ADMIN — Medication 50 MILLIGRAM(S): at 18:01

## 2021-02-06 RX ADMIN — AMLODIPINE BESYLATE 5 MILLIGRAM(S): 2.5 TABLET ORAL at 05:42

## 2021-02-06 RX ADMIN — Medication 1 APPLICATION(S): at 18:02

## 2021-02-06 RX ADMIN — Medication 1 TABLET(S): at 05:42

## 2021-02-06 RX ADMIN — ALBUTEROL 2 PUFF(S): 90 AEROSOL, METERED ORAL at 22:24

## 2021-02-06 RX ADMIN — Medication 2: at 12:03

## 2021-02-06 RX ADMIN — Medication 15 MILLILITER(S): at 11:47

## 2021-02-06 RX ADMIN — Medication 500 MILLIGRAM(S): at 11:46

## 2021-02-06 RX ADMIN — Medication 300 MILLIGRAM(S): at 11:31

## 2021-02-06 NOTE — PROGRESS NOTE ADULT - ASSESSMENT
61 YO F with PMHx of HTN, DVT on Xarelto, Peritonitis s/p Oral's Procedure and Ileostomy (reversed in 2011) and AARON who presented to [facility] on 11/18 for AHRF second to COVID 19, intubated on 11/23 complicated by R lung abscesses on CT CHEST 12/5, multibacterial PNA and bacteremia, ARTEMIO s/p CRRT and HD, and s/p Tracheostomy 12/18 c/b track leak and s/p OR 8Bivona. Transferred to [facility] for furhter management.     # AMS   - AOX3 at baseline.   - CTH and EEG WNL   - Now weaned off all sedation.   - Monitor mentation and supportive care     # ARDS second to COVID vs Multi-bacterial PNA/ R Lung Abscess  - Intubated 11/23 and course complicated with multi-bacterial PNA and lung abscesses.   - Lung abscess presented on CT CHEST 12/5  - s/p Tracheostomy on 12/18 by CTSx. Sutures out 1/1/2021  - Air leak noted and s/p Trach Exchange for Bivona with CTSx 12/31  - Currently on FULL Vent. PS 15/5/30 as tolerated.   - Proventil, IPV and Chest PT Q6H and Suction PRN. Trach Care QD.   - Last day of abx 2/6 -10 day course     # Vasoplegic vs Septic Shock   - Weaned off pressors. Monitor BP on Norvasc and Metoprolol.   - On full AC for Hx of DVT. CTA CHEST with no PEs.     # ARTEMIO s/p CRRT and HD   - ARTEMIO now resolved and no longer required HD.   - Monitor renal function and avoid nephrotoxins.    # C-Diff   -Isolation precautions   Vanco po started   Less stools      # Dysphagia   - Initially CTSx and GI deferred PEG given Ventral Hernia   - s/p PEG placement with IR 1/11   - Continue on TF and monitor tolerance.   - Monitor diarrhea as CDIFF (+). Hold laxatives.     # Sepsis second to COVID vs Multi-bacterial PNA/ Bacteremia and R Lung Abscess vs CDIFF (+)   - COVID with superimposed multi-bacterial VAP vs aspiration PNA vs cavitary PNA.   - SCx (11/24) MSSA and BCx (11/27) with MSSA and Proteus Bacteremia  - SCx (12/1, 12/12 and 12/27) with Stenotrophomonas.   - SCx (1/9) with  Pseudomonas and Stenotrophomonas   - BCX has been persistently negative from 12/1, and most recently 1/12   - CT CHEST 12/4 with right lung cavitation.   - s/p multiple antibiotics, but now completed Fortaz and Flagyl (1/12-1/16)  - RPT CT CHEST 1/28 with improvement of right lung abscess   - SCx with  Pseudomonas and Stenotrophomonas noted and started on dual pseudomonal coverage (1/28-2/3)    - CDIFF (+) and started on PO Vancomycin (2/1 - 2/10)     # DM2   - Continue on ISS and NPH and monitor FS.   - Adjust insulin as needed.     # Hx of DVT   - Continue on Lovenox FULL DOSE for Xarelto for now.     # Skin/ Facial Wounds   - WOC recommendations appreciated   - Plastics evaluation and recommendations of facial wound appreciated     # DVT/ GI PPX with FULL AC/ PPI   # CODE STATUS - FULL CODE   # DISPO - PT recc Rehab/ Vent Facility    61 YO F with PMHx of HTN, DVT on Xarelto, Peritonitis s/p Oral's Procedure and Ileostomy (reversed in 2011) and AARON who presented to [facility] on 11/18 for AHRF second to COVID 19, intubated on 11/23 complicated by R lung abscesses on CT CHEST 12/5, multibacterial PNA and bacteremia, ARTEMIO s/p CRRT and HD, and s/p Tracheostomy 12/18 c/b track leak and s/p OR 8Bivona. Transferred to [facility] for furhter management.     # AMS   - AOX3 at baseline.   - CTH and EEG WNL   - Now weaned off all sedation.   - Monitor mentation and supportive care     # ARDS second to COVID vs Multi-bacterial PNA/ R Lung Abscess  - Intubated 11/23 and course complicated with multi-bacterial PNA and lung abscesses.   - Lung abscess presented on CT CHEST 12/5  - s/p Tracheostomy on 12/18 by CTSx. Sutures out 1/1/2021  - Air leak noted and s/p Trach Exchange for Bivona with CTSx 12/31  - Currently on FULL Vent. PS 15/5/30 as tolerated.   - Proventil, IPV and Chest PT Q6H and Suction PRN. Trach Care QD.   - Last day of abx 2/6 -10 day course     # Vasoplegic vs Septic Shock   - Weaned off pressors. Monitor BP on Norvasc and Metoprolol.   - On full AC for Hx of DVT. CTA CHEST with no PEs.     # ARTEMIO s/p CRRT and HD   - ARTEMIO now resolved and no longer required HD.   - Monitor renal function and avoid nephrotoxins.    # C-Diff   -Isolation precautions   Vanco po started   Less stools      # Dysphagia   - Initially CTSx and GI deferred PEG given Ventral Hernia   - s/p PEG placement with IR 1/11   - Continue on TF and monitor tolerance.   - Monitor diarrhea as CDIFF (+). Hold laxatives.     # Sepsis second to COVID vs Multi-bacterial PNA/ Bacteremia and R Lung Abscess vs CDIFF (+)   - COVID with superimposed multi-bacterial VAP vs aspiration PNA vs cavitary PNA.   - SCx (11/24) MSSA and BCx (11/27) with MSSA and Proteus Bacteremia  - SCx (12/1, 12/12 and 12/27) with Stenotrophomonas.   - SCx (1/9) with  Pseudomonas and Stenotrophomonas   - BCX has been persistently negative from 12/1, and most recently 1/12   - CT CHEST 12/4 with right lung cavitation.   - s/p multiple antibiotics, but now completed Fortaz and Flagyl (1/12-1/16)  - RPT CT CHEST 1/28 with improvement of right lung abscess   - SCx with  Pseudomonas and Stenotrophomonas noted and started on dual pseudomonal coverage (1/28-2/3)    - CDIFF (+) and started on PO Vancomycin (2/1 - 2/10)     # DM2   - Continue on ISS and NPH and monitor FS.   - Adjust insulin as needed.     # Hx of DVT   - Continue on Lovenox FULL DOSE for Xarelto for now.     # Skin/ Facial Wounds   - WOC recommendations appreciated   - Plastics evaluation and recommendations of facial wound appreciated     # DVT/ GI PPX with FULL AC/ PPI   # CODE STATUS - FULL CODE   # DISPO - PT recc Rehab/ Vent Facility   ************  2/6-no changes o/n   59 YO F with PMHx of HTN, DVT on Xarelto, Peritonitis s/p Oral's Procedure and Ileostomy (reversed in 2011) and AARON who presented to [facility] on 11/18 for AHRF second to COVID 19, intubated on 11/23 complicated by R lung abscesses on CT CHEST 12/5, multibacterial PNA and bacteremia, ARTEMIO s/p CRRT and HD, and s/p Tracheostomy 12/18 c/b track leak and s/p OR 8Bivona. Transferred to [facility] for furhter management.     # AMS   - AOX3 at baseline.   - CTH and EEG WNL   - Now weaned off all sedation.   - Monitor mentation and supportive care     # ARDS second to COVID vs Multi-bacterial PNA/ R Lung Abscess  - Intubated 11/23 and course complicated with multi-bacterial PNA and lung abscesses.   - Lung abscess presented on CT CHEST 12/5  - s/p Tracheostomy on 12/18 by CTSx. Sutures out 1/1/2021  - Air leak noted and s/p Trach Exchange for Bivona with CTSx 12/31  - Currently on FULL Vent. PS 15/5/30 as tolerated.   - Proventil, IPV and Chest PT Q6H and Suction PRN. Trach Care QD.   - Last day of abx 2/6 -10 day course     # Vasoplegic vs Septic Shock   - Weaned off pressors. Monitor BP on Norvasc and Metoprolol.   - On full AC for Hx of DVT. CTA CHEST with no PEs.     # ARTEMIO s/p CRRT and HD   - ARTEMIO now resolved and no longer required HD.   - Monitor renal function and avoid nephrotoxins.    # C-Diff   -Isolation precautions   Vanco po started   Copious diarrhea 2/6     # Dysphagia   - Initially CTSx and GI deferred PEG given Ventral Hernia   - s/p PEG placement with IR 1/11   - Continue on TF and monitor tolerance.   - Monitor diarrhea as CDIFF (+). Hold laxatives.     # Sepsis second to COVID vs Multi-bacterial PNA/ Bacteremia and R Lung Abscess vs CDIFF (+)   - COVID with superimposed multi-bacterial VAP vs aspiration PNA vs cavitary PNA.   - SCx (11/24) MSSA and BCx (11/27) with MSSA and Proteus Bacteremia  - SCx (12/1, 12/12 and 12/27) with Stenotrophomonas.   - SCx (1/9) with  Pseudomonas and Stenotrophomonas   - BCX has been persistently negative from 12/1, and most recently 1/12   - CT CHEST 12/4 with right lung cavitation.   - s/p multiple antibiotics, but now completed Fortaz and Flagyl (1/12-1/16)  - RPT CT CHEST 1/28 with improvement of right lung abscess   - SCx with  Pseudomonas and Stenotrophomonas noted and started on dual pseudomonal coverage (1/28-2/3)    - CDIFF (+) and started on PO Vancomycin (2/1 - 2/10)     # DM2   - Continue on ISS and NPH and monitor FS.   - Adjust insulin as needed.     # Hx of DVT   - Continue on Lovenox FULL DOSE for Xarelto for now.     # Skin/ Facial Wounds   - WOC recommendations appreciated   - Plastics evaluation and recommendations of facial wound appreciated     # DVT/ GI PPX with FULL AC/ PPI   # CODE STATUS - FULL CODE   # DISPO - PT recc Rehab/ Vent Facility   ************  2/6-no changes o/n

## 2021-02-06 NOTE — PROGRESS NOTE ADULT - ATTENDING COMMENTS
Tolerating PS. Continue weaning trials.  Follow up with ID regarding dosing of Danial/Fortaz.  Continue PO vancomycin for C. Diff. as above-  resp failure--covid 19 pneumonitis-ARDS/PNA-lung abcess--trach done 12/18 (CTS)  resp--Tolerating PS. Continue weaning trials.  ID-- pseudomonas-- ID regarding dosing of Danial/Fortaz until 2/6;  C. difficile--Continue PO vancomycin for C. Diff.  DVT ++continue -lovenox 100 q12       GI-s/p peg-on TF  neuro--PT/OT, A and O x 4       DM-BS control  Marcial Montanez MD-Pulmonary   [phone]

## 2021-02-06 NOTE — PROGRESS NOTE ADULT - SUBJECTIVE AND OBJECTIVE BOX
CHIEF COMPLAINT:     Interval Events:    REVIEW OF SYSTEMS:  Constitutional:   Eyes:  ENT:  CV:  Resp:  GI:  :  MSK:  Integumentary:  Neurological:  Psychiatric:  Endocrine:  Hematologic/Lymphatic:  Allergic/Immunologic:  [ ] All other systems negative  [ ] Unable to assess ROS because ________    OBJECTIVE:  ICU Vital Signs Last 24 Hrs  T(C): 37.1 (06 Feb 2021 05:36), Max: 37.1 (06 Feb 2021 05:36)  T(F): 98.7 (06 Feb 2021 05:36), Max: 98.7 (06 Feb 2021 05:36)  HR: 101 (06 Feb 2021 05:36) (78 - 103)  BP: 139/60 (06 Feb 2021 05:36) (136/65 - 151/60)  BP(mean): --  ABP: --  ABP(mean): --  RR: 19 (06 Feb 2021 05:36) (16 - 20)  SpO2: 98% (06 Feb 2021 05:36) (98% - 100%)    Mode: AC/ CMV (Assist Control/ Continuous Mandatory Ventilation), RR (machine): 12, TV (machine): 450, FiO2: 30, PEEP: 5, MAP: 15, PIP: 28    02-05 @ 07:01  -  02-06 @ 07:00  --------------------------------------------------------  IN: 1304 mL / OUT: 1000 mL / NET: 304 mL      CAPILLARY BLOOD GLUCOSE      POCT Blood Glucose.: 144 mg/dL (06 Feb 2021 05:44)      HOSPITAL MEDICATIONS:  MEDICATIONS  (STANDING):  ALBUTerol    90 MICROgram(s) HFA Inhaler 2 Puff(s) Inhalation every 6 hours  amLODIPine   Tablet 5 milliGRAM(s) Oral daily  ascorbic acid 500 milliGRAM(s) Oral daily  cefTAZidime  IVPB 2 Gram(s) IV Intermittent every 8 hours  chlorhexidine 4% Liquid 1 Application(s) Topical daily  collagenase Ointment 1 Application(s) Topical two times a day  dextrose 40% Gel 15 Gram(s) Oral once  dextrose 5%. 1000 milliLiter(s) (50 mL/Hr) IV Continuous <Continuous>  dextrose 5%. 1000 milliLiter(s) (100 mL/Hr) IV Continuous <Continuous>  dextrose 50% Injectable 25 Gram(s) IV Push once  dextrose 50% Injectable 12.5 Gram(s) IV Push once  dextrose 50% Injectable 25 Gram(s) IV Push once  enoxaparin Injectable 100 milliGRAM(s) SubCutaneous every 12 hours  glucagon  Injectable 1 milliGRAM(s) IntraMuscular once  insulin lispro (ADMELOG) corrective regimen sliding scale   SubCutaneous every 6 hours  lactobacillus acidophilus 1 Tablet(s) Oral two times a day  metoprolol tartrate 50 milliGRAM(s) Oral two times a day  multivitamin/minerals/iron Oral Solution (CENTRUM) 15 milliLiter(s) Oral daily  pancrelipase (VIOKACE 20,880 Lipase Units) 1 Tablet(s) Oral once  pantoprazole   Suspension 40 milliGRAM(s) Oral daily  tobramycin for Nebulization 300 milliGRAM(s) Inhalation every 12 hours  vancomycin    Solution 125 milliGRAM(s) Oral every 6 hours  zinc sulfate 220 milliGRAM(s) Oral daily    MEDICATIONS  (PRN):  acetaminophen    Suspension .. 650 milliGRAM(s) Oral every 6 hours PRN Temp greater or equal to 38C (100.4F), Mild Pain (1 - 3), Moderate Pain (4 - 6)  artificial tears (preservative free) Ophthalmic Solution 1 Drop(s) Both EYES every 8 hours PRN Dry Eyes  oxyCODONE    Solution 5 milliGRAM(s) Oral every 4 hours PRN Severe Pain (7 - 10)      LABS:                               MICROBIOLOGY:     RADIOLOGY:  [ ] Reviewed and interpreted by me    PULMONARY FUNCTION TESTS:    EKG: CHIEF COMPLAINT: Patient is a 60y old  Female who presents with a chief complaint of Transfer from [facility].    Interval Events:    REVIEW OF SYSTEMS:  [ ] All other systems negative  [ ] Unable to assess ROS because ________    OBJECTIVE:  ICU Vital Signs Last 24 Hrs  T(C): 37.1 (06 Feb 2021 05:36), Max: 37.1 (06 Feb 2021 05:36)  T(F): 98.7 (06 Feb 2021 05:36), Max: 98.7 (06 Feb 2021 05:36)  HR: 101 (06 Feb 2021 05:36) (78 - 103)  BP: 139/60 (06 Feb 2021 05:36) (136/65 - 151/60)  RR: 19 (06 Feb 2021 05:36) (16 - 20)  SpO2: 98% (06 Feb 2021 05:36) (98% - 100%)    Mode: AC/ CMV (Assist Control/ Continuous Mandatory Ventilation), RR (machine): 12, TV (machine): 450, FiO2: 30, PEEP: 5, MAP: 15, PIP: 28    02-05 @ 07:01  -  02-06 @ 07:00  --------------------------------------------------------  IN: 1304 mL / OUT: 1000 mL / NET: 304 mL      CAPILLARY BLOOD GLUCOSE      POCT Blood Glucose.: 144 mg/dL (06 Feb 2021 05:44)      HOSPITAL MEDICATIONS:  MEDICATIONS  (STANDING):  ALBUTerol    90 MICROgram(s) HFA Inhaler 2 Puff(s) Inhalation every 6 hours  amLODIPine   Tablet 5 milliGRAM(s) Oral daily  ascorbic acid 500 milliGRAM(s) Oral daily  cefTAZidime  IVPB 2 Gram(s) IV Intermittent every 8 hours  chlorhexidine 4% Liquid 1 Application(s) Topical daily  collagenase Ointment 1 Application(s) Topical two times a day  dextrose 40% Gel 15 Gram(s) Oral once  dextrose 5%. 1000 milliLiter(s) (50 mL/Hr) IV Continuous <Continuous>  dextrose 5%. 1000 milliLiter(s) (100 mL/Hr) IV Continuous <Continuous>  dextrose 50% Injectable 25 Gram(s) IV Push once  dextrose 50% Injectable 12.5 Gram(s) IV Push once  dextrose 50% Injectable 25 Gram(s) IV Push once  enoxaparin Injectable 100 milliGRAM(s) SubCutaneous every 12 hours  glucagon  Injectable 1 milliGRAM(s) IntraMuscular once  insulin lispro (ADMELOG) corrective regimen sliding scale   SubCutaneous every 6 hours  lactobacillus acidophilus 1 Tablet(s) Oral two times a day  metoprolol tartrate 50 milliGRAM(s) Oral two times a day  multivitamin/minerals/iron Oral Solution (CENTRUM) 15 milliLiter(s) Oral daily  pancrelipase (VIOKACE 20,880 Lipase Units) 1 Tablet(s) Oral once  pantoprazole   Suspension 40 milliGRAM(s) Oral daily  tobramycin for Nebulization 300 milliGRAM(s) Inhalation every 12 hours  vancomycin    Solution 125 milliGRAM(s) Oral every 6 hours  zinc sulfate 220 milliGRAM(s) Oral daily    MEDICATIONS  (PRN):  acetaminophen    Suspension .. 650 milliGRAM(s) Oral every 6 hours PRN Temp greater or equal to 38C (100.4F), Mild Pain (1 - 3), Moderate Pain (4 - 6)  artificial tears (preservative free) Ophthalmic Solution 1 Drop(s) Both EYES every 8 hours PRN Dry Eyes  oxyCODONE    Solution 5 milliGRAM(s) Oral every 4 hours PRN Severe Pain (7 - 10)      LABS:                               MICROBIOLOGY:     RADIOLOGY:  [ ] Reviewed and interpreted by me    PULMONARY FUNCTION TESTS:    EKG: CHIEF COMPLAINT: Patient is a 60y old  Female who presents with a chief complaint of Transfer from [facility].    Interval Events: No interval events noted overnight.   Follow up AHRF 2/2 COVID, Pseudomonas/Steno in sputum, and c.diff    REVIEW OF SYSTEMS:  Denies fever, chills, SOB, CP, abd pain, N/V  [x] All other systems negative    OBJECTIVE:  ICU Vital Signs Last 24 Hrs  T(C): 37.1 (06 Feb 2021 05:36), Max: 37.1 (06 Feb 2021 05:36)  T(F): 98.7 (06 Feb 2021 05:36), Max: 98.7 (06 Feb 2021 05:36)  HR: 101 (06 Feb 2021 05:36) (78 - 103)  BP: 139/60 (06 Feb 2021 05:36) (136/65 - 151/60)  RR: 19 (06 Feb 2021 05:36) (16 - 20)  SpO2: 98% (06 Feb 2021 05:36) (98% - 100%)    Mode: AC/ CMV (Assist Control/ Continuous Mandatory Ventilation), RR (machine): 12, TV (machine): 450, FiO2: 30, PEEP: 5, MAP: 15, PIP: 28    02-05 @ 07:01  -  02-06 @ 07:00  --------------------------------------------------------  IN: 1304 mL / OUT: 1000 mL / NET: 304 mL      CAPILLARY BLOOD GLUCOSE      POCT Blood Glucose.: 144 mg/dL (06 Feb 2021 05:44)      HOSPITAL MEDICATIONS:  MEDICATIONS  (STANDING):  ALBUTerol    90 MICROgram(s) HFA Inhaler 2 Puff(s) Inhalation every 6 hours  amLODIPine   Tablet 5 milliGRAM(s) Oral daily  ascorbic acid 500 milliGRAM(s) Oral daily  cefTAZidime  IVPB 2 Gram(s) IV Intermittent every 8 hours  chlorhexidine 4% Liquid 1 Application(s) Topical daily  collagenase Ointment 1 Application(s) Topical two times a day  dextrose 40% Gel 15 Gram(s) Oral once  dextrose 5%. 1000 milliLiter(s) (50 mL/Hr) IV Continuous <Continuous>  dextrose 5%. 1000 milliLiter(s) (100 mL/Hr) IV Continuous <Continuous>  dextrose 50% Injectable 25 Gram(s) IV Push once  dextrose 50% Injectable 12.5 Gram(s) IV Push once  dextrose 50% Injectable 25 Gram(s) IV Push once  enoxaparin Injectable 100 milliGRAM(s) SubCutaneous every 12 hours  glucagon  Injectable 1 milliGRAM(s) IntraMuscular once  insulin lispro (ADMELOG) corrective regimen sliding scale   SubCutaneous every 6 hours  lactobacillus acidophilus 1 Tablet(s) Oral two times a day  metoprolol tartrate 50 milliGRAM(s) Oral two times a day  multivitamin/minerals/iron Oral Solution (CENTRUM) 15 milliLiter(s) Oral daily  pancrelipase (VIOKACE 20,880 Lipase Units) 1 Tablet(s) Oral once  pantoprazole   Suspension 40 milliGRAM(s) Oral daily  tobramycin for Nebulization 300 milliGRAM(s) Inhalation every 12 hours  vancomycin    Solution 125 milliGRAM(s) Oral every 6 hours  zinc sulfate 220 milliGRAM(s) Oral daily    MEDICATIONS  (PRN):  acetaminophen    Suspension .. 650 milliGRAM(s) Oral every 6 hours PRN Temp greater or equal to 38C (100.4F), Mild Pain (1 - 3), Moderate Pain (4 - 6)  artificial tears (preservative free) Ophthalmic Solution 1 Drop(s) Both EYES every 8 hours PRN Dry Eyes  oxyCODONE    Solution 5 milliGRAM(s) Oral every 4 hours PRN Severe Pain (7 - 10)      LABS:                   MICROBIOLOGY:     RADIOLOGY:  [ ] Reviewed and interpreted by me    PULMONARY FUNCTION TESTS:    EKG:

## 2021-02-07 LAB
ANION GAP SERPL CALC-SCNC: 12 MMOL/L — SIGNIFICANT CHANGE UP (ref 7–14)
BUN SERPL-MCNC: 6 MG/DL — LOW (ref 7–23)
CALCIUM SERPL-MCNC: 9.5 MG/DL — SIGNIFICANT CHANGE UP (ref 8.4–10.5)
CHLORIDE SERPL-SCNC: 101 MMOL/L — SIGNIFICANT CHANGE UP (ref 98–107)
CO2 SERPL-SCNC: 26 MMOL/L — SIGNIFICANT CHANGE UP (ref 22–31)
CREAT SERPL-MCNC: 0.2 MG/DL — LOW (ref 0.5–1.3)
GLUCOSE BLDC GLUCOMTR-MCNC: 103 MG/DL — HIGH (ref 70–99)
GLUCOSE BLDC GLUCOMTR-MCNC: 107 MG/DL — HIGH (ref 70–99)
GLUCOSE BLDC GLUCOMTR-MCNC: 145 MG/DL — HIGH (ref 70–99)
GLUCOSE BLDC GLUCOMTR-MCNC: 147 MG/DL — HIGH (ref 70–99)
GLUCOSE BLDC GLUCOMTR-MCNC: 147 MG/DL — HIGH (ref 70–99)
GLUCOSE SERPL-MCNC: 140 MG/DL — HIGH (ref 70–99)
HCT VFR BLD CALC: 31.5 % — LOW (ref 34.5–45)
HGB BLD-MCNC: 9.2 G/DL — LOW (ref 11.5–15.5)
MAGNESIUM SERPL-MCNC: 1.6 MG/DL — SIGNIFICANT CHANGE UP (ref 1.6–2.6)
MCHC RBC-ENTMCNC: 27.8 PG — SIGNIFICANT CHANGE UP (ref 27–34)
MCHC RBC-ENTMCNC: 29.2 GM/DL — LOW (ref 32–36)
MCV RBC AUTO: 95.2 FL — SIGNIFICANT CHANGE UP (ref 80–100)
NRBC # BLD: 0 /100 WBCS — SIGNIFICANT CHANGE UP
NRBC # FLD: 0 K/UL — SIGNIFICANT CHANGE UP
PLATELET # BLD AUTO: 472 K/UL — HIGH (ref 150–400)
POTASSIUM SERPL-MCNC: 3.3 MMOL/L — LOW (ref 3.5–5.3)
POTASSIUM SERPL-SCNC: 3.3 MMOL/L — LOW (ref 3.5–5.3)
RBC # BLD: 3.31 M/UL — LOW (ref 3.8–5.2)
RBC # FLD: 16.4 % — HIGH (ref 10.3–14.5)
SODIUM SERPL-SCNC: 139 MMOL/L — SIGNIFICANT CHANGE UP (ref 135–145)
WBC # BLD: 9.88 K/UL — SIGNIFICANT CHANGE UP (ref 3.8–10.5)
WBC # FLD AUTO: 9.88 K/UL — SIGNIFICANT CHANGE UP (ref 3.8–10.5)

## 2021-02-07 PROCEDURE — 99232 SBSQ HOSP IP/OBS MODERATE 35: CPT

## 2021-02-07 RX ORDER — POTASSIUM CHLORIDE 20 MEQ
40 PACKET (EA) ORAL ONCE
Refills: 0 | Status: COMPLETED | OUTPATIENT
Start: 2021-02-07 | End: 2021-02-07

## 2021-02-07 RX ADMIN — ALBUTEROL 2 PUFF(S): 90 AEROSOL, METERED ORAL at 21:57

## 2021-02-07 RX ADMIN — Medication 50 MILLIGRAM(S): at 18:01

## 2021-02-07 RX ADMIN — Medication 1 DROP(S): at 23:38

## 2021-02-07 RX ADMIN — PANTOPRAZOLE SODIUM 40 MILLIGRAM(S): 20 TABLET, DELAYED RELEASE ORAL at 12:21

## 2021-02-07 RX ADMIN — Medication 1 TABLET(S): at 05:20

## 2021-02-07 RX ADMIN — ALBUTEROL 2 PUFF(S): 90 AEROSOL, METERED ORAL at 04:23

## 2021-02-07 RX ADMIN — Medication 125 MILLIGRAM(S): at 05:20

## 2021-02-07 RX ADMIN — ALBUTEROL 2 PUFF(S): 90 AEROSOL, METERED ORAL at 16:44

## 2021-02-07 RX ADMIN — Medication 1 APPLICATION(S): at 18:01

## 2021-02-07 RX ADMIN — Medication 15 MILLILITER(S): at 12:21

## 2021-02-07 RX ADMIN — AMLODIPINE BESYLATE 5 MILLIGRAM(S): 2.5 TABLET ORAL at 05:19

## 2021-02-07 RX ADMIN — ENOXAPARIN SODIUM 100 MILLIGRAM(S): 100 INJECTION SUBCUTANEOUS at 18:01

## 2021-02-07 RX ADMIN — Medication 1 APPLICATION(S): at 05:20

## 2021-02-07 RX ADMIN — OXYCODONE HYDROCHLORIDE 5 MILLIGRAM(S): 5 TABLET ORAL at 12:20

## 2021-02-07 RX ADMIN — Medication 125 MILLIGRAM(S): at 18:01

## 2021-02-07 RX ADMIN — ENOXAPARIN SODIUM 100 MILLIGRAM(S): 100 INJECTION SUBCUTANEOUS at 05:20

## 2021-02-07 RX ADMIN — ZINC SULFATE TAB 220 MG (50 MG ZINC EQUIVALENT) 220 MILLIGRAM(S): 220 (50 ZN) TAB at 12:21

## 2021-02-07 RX ADMIN — ALBUTEROL 2 PUFF(S): 90 AEROSOL, METERED ORAL at 11:53

## 2021-02-07 RX ADMIN — Medication 500 MILLIGRAM(S): at 12:21

## 2021-02-07 RX ADMIN — Medication 125 MILLIGRAM(S): at 12:21

## 2021-02-07 RX ADMIN — Medication 1 TABLET(S): at 18:01

## 2021-02-07 RX ADMIN — Medication 50 MILLIGRAM(S): at 05:19

## 2021-02-07 RX ADMIN — CHLORHEXIDINE GLUCONATE 1 APPLICATION(S): 213 SOLUTION TOPICAL at 12:22

## 2021-02-07 RX ADMIN — Medication 40 MILLIEQUIVALENT(S): at 18:05

## 2021-02-07 RX ADMIN — Medication 125 MILLIGRAM(S): at 23:27

## 2021-02-07 NOTE — PROGRESS NOTE ADULT - ASSESSMENT
61 YO F with PMHx of HTN, DVT on Xarelto, Peritonitis s/p Oral's Procedure and Ileostomy (reversed in 2011) and AARON who presented to [facility] on 11/18 for AHRF second to COVID 19, intubated on 11/23 complicated by R lung abscesses on CT CHEST 12/5, multibacterial PNA and bacteremia, ARTEMIO s/p CRRT and HD, and s/p Tracheostomy 12/18 c/b track leak and s/p OR 8Bivona. Transferred to [facility] for furhter management.     # AMS   - AOX3 at baseline.   - CTH and EEG WNL   - Now weaned off all sedation.   - Monitor mentation and supportive care     # ARDS second to COVID vs Multi-bacterial PNA/ R Lung Abscess  - Intubated 11/23 and course complicated with multi-bacterial PNA and lung abscesses.   - Lung abscess presented on CT CHEST 12/5  - s/p Tracheostomy on 12/18 by CTSx. Sutures out 1/1/2021  - Air leak noted and s/p Trach Exchange for Bivona with CTSx 12/31  - Currently on FULL Vent. PS 15/5/30 as tolerated.   - Proventil, IPV and Chest PT Q6H and Suction PRN. Trach Care QD.   - Last day of abx 2/6 -10 day course     # Vasoplegic vs Septic Shock   - Weaned off pressors. Monitor BP on Norvasc and Metoprolol.   - On full AC for Hx of DVT. CTA CHEST with no PEs.     # ARTEMIO s/p CRRT and HD   - ARTEMIO now resolved and no longer required HD.   - Monitor renal function and avoid nephrotoxins.    # C-Diff   -Isolation precautions   Vanco po started   Copious diarrhea 2/6     # Dysphagia   - Initially CTSx and GI deferred PEG given Ventral Hernia   - s/p PEG placement with IR 1/11   - Continue on TF and monitor tolerance.   - Monitor diarrhea as CDIFF (+). Hold laxatives.     # Sepsis second to COVID vs Multi-bacterial PNA/ Bacteremia and R Lung Abscess vs CDIFF (+)   - COVID with superimposed multi-bacterial VAP vs aspiration PNA vs cavitary PNA.   - SCx (11/24) MSSA and BCx (11/27) with MSSA and Proteus Bacteremia  - SCx (12/1, 12/12 and 12/27) with Stenotrophomonas.   - SCx (1/9) with  Pseudomonas and Stenotrophomonas   - BCX has been persistently negative from 12/1, and most recently 1/12   - CT CHEST 12/4 with right lung cavitation.   - s/p multiple antibiotics, but now completed Fortaz and Flagyl (1/12-1/16)  - RPT CT CHEST 1/28 with improvement of right lung abscess   - SCx with  Pseudomonas and Stenotrophomonas noted and started on dual pseudomonal coverage (1/28-2/3)    - CDIFF (+) and started on PO Vancomycin (2/1 - 2/10)     # DM2   - Continue on ISS and NPH and monitor FS.   - Adjust insulin as needed.     # Hx of DVT   - Continue on Lovenox FULL DOSE for Xarelto for now.     # Skin/ Facial Wounds   - WOC recommendations appreciated   - Plastics evaluation and recommendations of facial wound appreciated     # DVT/ GI PPX with FULL AC/ PPI   # CODE STATUS - FULL CODE   # DISPO - PT recc Rehab/ Vent Facility   ************  2/6-no changes o/n   59 YO F with PMHx of HTN, DVT on Xarelto, Peritonitis s/p Oral's Procedure and Ileostomy (reversed in 2011) and AARON who presented to [facility] on 11/18 for AHRF second to COVID 19, intubated on 11/23 complicated by R lung abscesses on CT CHEST 12/5, multibacterial PNA and bacteremia, ARTEMIO s/p CRRT and HD, and s/p Tracheostomy 12/18 c/b track leak and s/p OR 8Bivona. Transferred to [facility] for furhter management.     # AMS   - AOX3 at baseline.   - CTH and EEG WNL   - Now weaned off all sedation.   - Monitor mentation and supportive care     # ARDS second to COVID vs Multi-bacterial PNA/ R Lung Abscess  - Intubated 11/23 and course complicated with multi-bacterial PNA and lung abscesses.   - Lung abscess presented on CT CHEST 12/5  - s/p Tracheostomy on 12/18 by CTSx. Sutures out 1/1/2021  - Air leak noted and s/p Trach Exchange for Bivona with CTSx 12/31  - Currently on FULL Vent. PS 15/5/30 as tolerated.   - Proventil, IPV and Chest PT Q6H and Suction PRN. Trach Care QD.   - Last day of abx 2/6 -10 day course     # Vasoplegic vs Septic Shock   - Weaned off pressors. Monitor BP on Norvasc and Metoprolol.   - On full AC for Hx of DVT. CTA CHEST with no PEs.     # ARTEMIO s/p CRRT and HD   - ARTEMIO now resolved and no longer required HD.   - Monitor renal function and avoid nephrotoxins.    # C-Diff   -Isolation precautions   Vanco po started   Copious diarrhea 2/6     # Dysphagia   - Initially CTSx and GI deferred PEG given Ventral Hernia   - s/p PEG placement with IR 1/11   - Continue on TF and monitor tolerance.   - Monitor diarrhea as CDIFF (+). Hold laxatives.     # Sepsis second to COVID vs Multi-bacterial PNA/ Bacteremia and R Lung Abscess vs CDIFF (+)   - COVID with superimposed multi-bacterial VAP vs aspiration PNA vs cavitary PNA.   - SCx (11/24) MSSA and BCx (11/27) with MSSA and Proteus Bacteremia  - SCx (12/1, 12/12 and 12/27) with Stenotrophomonas.   - SCx (1/9) with  Pseudomonas and Stenotrophomonas   - BCX has been persistently negative from 12/1, and most recently 1/12   - CT CHEST 12/4 with right lung cavitation.   - s/p multiple antibiotics, but now completed Fortaz and Flagyl (1/12-1/16)  - RPT CT CHEST 1/28 with improvement of right lung abscess   - SCx with  Pseudomonas and Stenotrophomonas noted and started on dual pseudomonal coverage (1/28-2/3)    - CDIFF (+) and started on PO Vancomycin (2/1 - 2/10)     # DM2   - Continue on ISS and NPH and monitor FS.   - Adjust insulin as needed.     # Hx of DVT   - Continue on Lovenox FULL DOSE for Xarelto for now.     # Skin/ Facial Wounds   - WOC recommendations appreciated   - Plastics evaluation and recommendations of facial wound appreciated     # DVT/ GI PPX with FULL AC/ PPI   # CODE STATUS - FULL CODE   # DISPO - PT recc Rehab/ Vent Facility   ************  2/6-no changes o/n  2/7-no events-still loose stool   61 YO F with PMHx of HTN, DVT on Xarelto, Peritonitis s/p Oral's Procedure and Ileostomy (reversed in 2011) and AARON who presented to [facility] on 11/18 for AHRF second to COVID 19, intubated on 11/23 complicated by R lung abscesses on CT CHEST 12/5, multibacterial PNA and bacteremia, ARTEMIO s/p CRRT and HD, and s/p Tracheostomy 12/18 c/b track leak and s/p OR 8Bivona. Transferred to [facility] for furhter management.     # AMS   - AOX3 at baseline.   - CTH and EEG WNL   - Now weaned off all sedation.   - Monitor mentation and supportive care     # ARDS second to COVID vs Multi-bacterial PNA/ R Lung Abscess  - Intubated 11/23 and course complicated with multi-bacterial PNA and lung abscesses.   - Lung abscess presented on CT CHEST 12/5  - s/p Tracheostomy on 12/18 by CTSx. Sutures out 1/1/2021  - Air leak noted and s/p Trach Exchange for Bivona with CTSx 12/31  - Currently on FULL Vent. PS 15/5/30 as tolerated.   - Proventil, IPV and Chest PT Q6H and Suction PRN. Trach Care QD.   - Last day of abx 2/6 - Completed 10 day course     # Vasoplegic vs Septic Shock   - Weaned off pressors. Monitor BP on Norvasc and Metoprolol.   - On full AC for Hx of DVT. CTA CHEST with no PEs.     # ARTEMIO s/p CRRT and HD   - ARTEMIO now resolved and no longer required HD.   - Monitor renal function and avoid nephrotoxins.    # C-Diff   -Isolation precautions   - Continue on Vanc  - Still with diarrhea 2/7     # Dysphagia   - Initially CTSx and GI deferred PEG given Ventral Hernia   - s/p PEG placement with IR 1/11   - Continue on TF and monitor tolerance.   - Monitor diarrhea as CDIFF (+). Hold laxatives.     # Sepsis second to COVID vs Multi-bacterial PNA/ Bacteremia and R Lung Abscess vs CDIFF (+)   - COVID with superimposed multi-bacterial VAP vs aspiration PNA vs cavitary PNA.   - SCx (11/24) MSSA and BCx (11/27) with MSSA and Proteus Bacteremia  - SCx (12/1, 12/12 and 12/27) with Stenotrophomonas.   - SCx (1/9) with  Pseudomonas and Stenotrophomonas   - BCX has been persistently negative from 12/1, and most recently 1/12   - CT CHEST 12/4 with right lung cavitation.   - s/p multiple antibiotics, but now completed Fortaz and Flagyl (1/12-1/16)  - RPT CT CHEST 1/28 with improvement of right lung abscess   - SCx with  Pseudomonas and Stenotrophomonas noted and started on dual pseudomonal coverage-completed 2/6  - CDIFF (+) and started on PO Vancomycin (2/1 - 2/10)     # DM2   - Continue on ISS and NPH and monitor FS.   - Adjust insulin as needed.     # Hx of DVT   - Continue on Lovenox FULL DOSE for Xarelto for now.     # Skin/ Facial Wounds   - WOC recommendations appreciated   - Plastics evaluation and recommendations of facial wound appreciated     # DVT/ GI PPX with FULL AC/ PPI   # CODE STATUS - FULL CODE   # DISPO - PT recc Rehab/ Vent Facility   ************  2/6-no changes o/n  2/7-no events-still loose stool

## 2021-02-07 NOTE — PROGRESS NOTE ADULT - ATTENDING COMMENTS
as above-  resp failure--covid 19 pneumonitis-ARDS/PNA-lung abcess--trach done 12/18 (CTS)  resp--Tolerating PS. Continue weaning trials.  ID-- pseudomonas-- ID regarding dosing of Danial/Fortaz until 2/6;  C. difficile--Continue PO vancomycin for C. Diff.  DVT ++continue -lovenox 100 q12       GI-s/p peg-on TF  neuro--PT/OT, A and O x 4       DM-BS control  Marcial Montanez MD-Pulmonary   [phone] as above-no changes over day  resp failure--covid 19 pneumonitis-ARDS/PNA-lung abcess--trach done 12/18 (CTS)  resp--Tolerating PS. Continue weaning trials.  ID-- pseudomonas-- ID regarding dosing of Danial/Fortaz until 2/6;  C. difficile--Continue PO vancomycin for C. Diff.  DVT ++continue -lovenox 100 q12       GI-s/p peg-on TF  neuro--PT/OT, A and O x 4       DM-BS control  Marcial Montanez MD-Pulmonary   [phone]

## 2021-02-07 NOTE — PROGRESS NOTE ADULT - SUBJECTIVE AND OBJECTIVE BOX
CHIEF COMPLAINT: Patient is a 60y old  Female who presents with a chief complaint of COVID-19  Follow up AHRF 2/2 COVID, Pseudomonas/Steno in sputum, and c.diff    Interval Events:    REVIEW OF SYSTEMS:  [ ] All other systems negative  [ ] Unable to assess ROS because ________    OBJECTIVE:  ICU Vital Signs Last 24 Hrs  T(C): 36.8 (07 Feb 2021 05:04), Max: 37.2 (06 Feb 2021 13:36)  T(F): 98.3 (07 Feb 2021 05:04), Max: 98.9 (06 Feb 2021 13:36)  HR: 100 (07 Feb 2021 05:04) (79 - 100)  BP: 153/66 (07 Feb 2021 05:04) (128/60 - 153/66)  RR: 22 (07 Feb 2021 05:04) (20 - 25)  SpO2: 100% (07 Feb 2021 05:04) (96% - 100%)    Mode: AC/ CMV (Assist Control/ Continuous Mandatory Ventilation), RR (machine): 12, TV (machine): 450, FiO2: 30, PEEP: 5, MAP: 15, PIP: 26    02-06 @ 07:01  -  02-07 @ 07:00  --------------------------------------------------------  IN: 2104 mL / OUT: 1775 mL / NET: 329 mL      CAPILLARY BLOOD GLUCOSE      POCT Blood Glucose.: 147 mg/dL (07 Feb 2021 05:10)      HOSPITAL MEDICATIONS:  MEDICATIONS  (STANDING):  ALBUTerol    90 MICROgram(s) HFA Inhaler 2 Puff(s) Inhalation every 6 hours  amLODIPine   Tablet 5 milliGRAM(s) Oral daily  ascorbic acid 500 milliGRAM(s) Oral daily  chlorhexidine 4% Liquid 1 Application(s) Topical daily  collagenase Ointment 1 Application(s) Topical two times a day  dextrose 40% Gel 15 Gram(s) Oral once  dextrose 5%. 1000 milliLiter(s) (50 mL/Hr) IV Continuous <Continuous>  dextrose 5%. 1000 milliLiter(s) (100 mL/Hr) IV Continuous <Continuous>  dextrose 50% Injectable 25 Gram(s) IV Push once  dextrose 50% Injectable 12.5 Gram(s) IV Push once  dextrose 50% Injectable 25 Gram(s) IV Push once  enoxaparin Injectable 100 milliGRAM(s) SubCutaneous every 12 hours  glucagon  Injectable 1 milliGRAM(s) IntraMuscular once  insulin lispro (ADMELOG) corrective regimen sliding scale   SubCutaneous every 6 hours  lactobacillus acidophilus 1 Tablet(s) Oral two times a day  metoprolol tartrate 50 milliGRAM(s) Oral two times a day  multivitamin/minerals/iron Oral Solution (CENTRUM) 15 milliLiter(s) Oral daily  pancrelipase (VIOKACE 20,880 Lipase Units) 1 Tablet(s) Oral once  pantoprazole   Suspension 40 milliGRAM(s) Oral daily  vancomycin    Solution 125 milliGRAM(s) Oral every 6 hours  zinc sulfate 220 milliGRAM(s) Oral daily    MEDICATIONS  (PRN):  acetaminophen    Suspension .. 650 milliGRAM(s) Oral every 6 hours PRN Temp greater or equal to 38C (100.4F), Mild Pain (1 - 3), Moderate Pain (4 - 6)  artificial tears (preservative free) Ophthalmic Solution 1 Drop(s) Both EYES every 8 hours PRN Dry Eyes  oxyCODONE    Solution 5 milliGRAM(s) Oral every 4 hours PRN Severe Pain (7 - 10)      LABS:                        9.2    9.88  )-----------( 472      ( 07 Feb 2021 07:12 )             31.5                     MICROBIOLOGY:     RADIOLOGY:  [ ] Reviewed and interpreted by me    PULMONARY FUNCTION TESTS:    EKG: CHIEF COMPLAINT: Patient is a 60y old  Female who presents with a chief complaint of COVID-19  Follow up AHRF 2/2 COVID, Pseudomonas/Steno in sputum, and c.diff    Interval Events: No interval events noted overnight.     REVIEW OF SYSTEMS:  Denies fever, SOB, CP, abd pain.  [x] All other systems negative    OBJECTIVE:  ICU Vital Signs Last 24 Hrs  T(C): 36.8 (07 Feb 2021 05:04), Max: 37.2 (06 Feb 2021 13:36)  T(F): 98.3 (07 Feb 2021 05:04), Max: 98.9 (06 Feb 2021 13:36)  HR: 100 (07 Feb 2021 05:04) (79 - 100)  BP: 153/66 (07 Feb 2021 05:04) (128/60 - 153/66)  RR: 22 (07 Feb 2021 05:04) (20 - 25)  SpO2: 100% (07 Feb 2021 05:04) (96% - 100%)    Mode: AC/ CMV (Assist Control/ Continuous Mandatory Ventilation), RR (machine): 12, TV (machine): 450, FiO2: 30, PEEP: 5, MAP: 15, PIP: 26    02-06 @ 07:01  -  02-07 @ 07:00  --------------------------------------------------------  IN: 2104 mL / OUT: 1775 mL / NET: 329 mL      CAPILLARY BLOOD GLUCOSE      POCT Blood Glucose.: 147 mg/dL (07 Feb 2021 05:10)      HOSPITAL MEDICATIONS:  MEDICATIONS  (STANDING):  ALBUTerol    90 MICROgram(s) HFA Inhaler 2 Puff(s) Inhalation every 6 hours  amLODIPine   Tablet 5 milliGRAM(s) Oral daily  ascorbic acid 500 milliGRAM(s) Oral daily  chlorhexidine 4% Liquid 1 Application(s) Topical daily  collagenase Ointment 1 Application(s) Topical two times a day  dextrose 40% Gel 15 Gram(s) Oral once  dextrose 5%. 1000 milliLiter(s) (50 mL/Hr) IV Continuous <Continuous>  dextrose 5%. 1000 milliLiter(s) (100 mL/Hr) IV Continuous <Continuous>  dextrose 50% Injectable 25 Gram(s) IV Push once  dextrose 50% Injectable 12.5 Gram(s) IV Push once  dextrose 50% Injectable 25 Gram(s) IV Push once  enoxaparin Injectable 100 milliGRAM(s) SubCutaneous every 12 hours  glucagon  Injectable 1 milliGRAM(s) IntraMuscular once  insulin lispro (ADMELOG) corrective regimen sliding scale   SubCutaneous every 6 hours  lactobacillus acidophilus 1 Tablet(s) Oral two times a day  metoprolol tartrate 50 milliGRAM(s) Oral two times a day  multivitamin/minerals/iron Oral Solution (CENTRUM) 15 milliLiter(s) Oral daily  pancrelipase (VIOKACE 20,880 Lipase Units) 1 Tablet(s) Oral once  pantoprazole   Suspension 40 milliGRAM(s) Oral daily  vancomycin    Solution 125 milliGRAM(s) Oral every 6 hours  zinc sulfate 220 milliGRAM(s) Oral daily    MEDICATIONS  (PRN):  acetaminophen    Suspension .. 650 milliGRAM(s) Oral every 6 hours PRN Temp greater or equal to 38C (100.4F), Mild Pain (1 - 3), Moderate Pain (4 - 6)  artificial tears (preservative free) Ophthalmic Solution 1 Drop(s) Both EYES every 8 hours PRN Dry Eyes  oxyCODONE    Solution 5 milliGRAM(s) Oral every 4 hours PRN Severe Pain (7 - 10)      LABS:                        9.2    9.88  )-----------( 472      ( 07 Feb 2021 07:12 )             31.5       02-07    139  |  101  |  6<L>  ----------------------------<  140<H>  3.3<L>   |  26  |  0.20<L>    Ca    9.5      07 Feb 2021 07:12  Mg     1.6     02-07      MICROBIOLOGY:     RADIOLOGY:  [ ] Reviewed and interpreted by me    PULMONARY FUNCTION TESTS:    EKG:

## 2021-02-08 LAB
GLUCOSE BLDC GLUCOMTR-MCNC: 124 MG/DL — HIGH (ref 70–99)
GLUCOSE BLDC GLUCOMTR-MCNC: 131 MG/DL — HIGH (ref 70–99)
GLUCOSE BLDC GLUCOMTR-MCNC: 143 MG/DL — HIGH (ref 70–99)
GLUCOSE BLDC GLUCOMTR-MCNC: 144 MG/DL — HIGH (ref 70–99)

## 2021-02-08 PROCEDURE — 99233 SBSQ HOSP IP/OBS HIGH 50: CPT | Mod: GC

## 2021-02-08 PROCEDURE — 99223 1ST HOSP IP/OBS HIGH 75: CPT | Mod: GC

## 2021-02-08 RX ADMIN — ENOXAPARIN SODIUM 100 MILLIGRAM(S): 100 INJECTION SUBCUTANEOUS at 18:03

## 2021-02-08 RX ADMIN — Medication 1 APPLICATION(S): at 18:03

## 2021-02-08 RX ADMIN — ALBUTEROL 2 PUFF(S): 90 AEROSOL, METERED ORAL at 16:16

## 2021-02-08 RX ADMIN — Medication 50 MILLIGRAM(S): at 05:03

## 2021-02-08 RX ADMIN — ALBUTEROL 2 PUFF(S): 90 AEROSOL, METERED ORAL at 04:40

## 2021-02-08 RX ADMIN — ALBUTEROL 2 PUFF(S): 90 AEROSOL, METERED ORAL at 11:09

## 2021-02-08 RX ADMIN — CHLORHEXIDINE GLUCONATE 1 APPLICATION(S): 213 SOLUTION TOPICAL at 12:48

## 2021-02-08 RX ADMIN — Medication 1 TABLET(S): at 18:03

## 2021-02-08 RX ADMIN — ALBUTEROL 2 PUFF(S): 90 AEROSOL, METERED ORAL at 22:21

## 2021-02-08 RX ADMIN — Medication 125 MILLIGRAM(S): at 18:03

## 2021-02-08 RX ADMIN — ZINC SULFATE TAB 220 MG (50 MG ZINC EQUIVALENT) 220 MILLIGRAM(S): 220 (50 ZN) TAB at 12:47

## 2021-02-08 RX ADMIN — AMLODIPINE BESYLATE 5 MILLIGRAM(S): 2.5 TABLET ORAL at 05:02

## 2021-02-08 RX ADMIN — PANTOPRAZOLE SODIUM 40 MILLIGRAM(S): 20 TABLET, DELAYED RELEASE ORAL at 12:48

## 2021-02-08 RX ADMIN — Medication 125 MILLIGRAM(S): at 12:47

## 2021-02-08 RX ADMIN — Medication 1 APPLICATION(S): at 05:03

## 2021-02-08 RX ADMIN — Medication 125 MILLIGRAM(S): at 05:03

## 2021-02-08 RX ADMIN — Medication 1 TABLET(S): at 05:03

## 2021-02-08 RX ADMIN — Medication 50 MILLIGRAM(S): at 18:03

## 2021-02-08 RX ADMIN — Medication 500 MILLIGRAM(S): at 12:47

## 2021-02-08 RX ADMIN — OXYCODONE HYDROCHLORIDE 5 MILLIGRAM(S): 5 TABLET ORAL at 12:46

## 2021-02-08 RX ADMIN — ENOXAPARIN SODIUM 100 MILLIGRAM(S): 100 INJECTION SUBCUTANEOUS at 05:03

## 2021-02-08 RX ADMIN — Medication 125 MILLIGRAM(S): at 23:42

## 2021-02-08 NOTE — PROGRESS NOTE ADULT - ASSESSMENT
61 YO F with PMHx of HTN, DVT on Xarelto, Peritonitis s/p Oral's Procedure and Ileostomy (reversed in 2011) and AARON who presented to [facility] on 11/18 for AHRF second to COVID 19, intubated on 11/23 complicated by R lung abscesses on CT CHEST 12/5, multibacterial PNA and bacteremia, ARTEMIO s/p CRRT and HD, and s/p Tracheostomy 12/18 c/b track leak and s/p OR 8Bivona. Transferred to [facility] for furhter management.     # AMS   - AOX3 at baseline.   - CTH and EEG WNL   - Now weaned off all sedation.   - Monitor mentation and supportive care     # ARDS second to COVID vs Multi-bacterial PNA/ R Lung Abscess  - Intubated 11/23 and course complicated with multi-bacterial PNA and lung abscesses.   - Lung abscess presented on CT CHEST 12/5  - s/p Tracheostomy on 12/18 by CTSx. Sutures out 1/1/2021  - Air leak noted and s/p Trach Exchange for Bivona with CTSx 12/31  - Currently on FULL Vent. PS 15/5/30 as tolerated.   - Proventil, IPV and Chest PT Q6H and Suction PRN. Trach Care QD.     # Vasoplegic vs Septic Shock   - Weaned off pressors. Monitor BP on Norvasc and Metoprolol.   - On full AC for Hx of DVT. CTA CHEST with no PEs.     # ARTEMIO s/p CRRT and HD   - ARTEMIO now resolved and no longer required HD.   - Monitor renal function and avoid nephrotoxins.     # GI   - Initially CTSx and GI deferred PEG given Ventral Hernia   - s/p PEG placement with IR 1/11   - Continue on TF and monitor tolerance.   - Monitor diarrhea as CDIFF (+). Hold laxatives and continue on PO Vancomycin as below     # Sepsis second to COVID vs Multi-bacterial PNA/ Bacteremia and R Lung Abscess vs CDIFF (+)   - COVID with superimposed multi-bacterial VAP vs aspiration PNA vs cavitary PNA.   - SCx (11/24) MSSA and BCx (11/27) with MSSA and Proteus Bacteremia  - SCx (12/1, 12/12 and 12/27) with Stenotrophomonas.   - SCx (1/9) with  Pseudomonas and Stenotrophomonas   - BCX has been persistently negative from 12/1, and most recently 1/12   - CT CHEST 12/4 with right lung cavitation.   - s/p multiple antibiotics, but now completed Fortaz and Flagyl (1/12-1/16)  - RPT CT CHEST 1/28 with improvement of right lung abscess   - SCx with  Pseudomonas and Stenotrophomonas and s/p TED/ Fortaz (1/28-2/6)  - CDIFF (+) and started on PO Vancomycin (2/1 - 2/10)     # DM2   - Continue on ISS and NPH and monitor FS.   - Adjust insulin as needed.     # Hx of DVT   - Continue on Lovenox FULL DOSE for Xarelto for now.     # Skin/ Facial Wounds   - WOC recommendations appreciated   - Plastics evaluation and recommendations of facial wound appreciated     # DVT/ GI PPX with FULL AC/ PPI   # CODE STATUS - FULL CODE   # DISPO - PT recc Rehab/ Vent Facility  59 YO F with PMHx of HTN, DVT on Xarelto, Peritonitis s/p Oral's Procedure and Ileostomy (reversed in 2011) and AARON who presented to [facility] on 11/18 for AHRF second to COVID 19, intubated on 11/23 complicated by R lung abscesses on CT CHEST 12/5, multibacterial PNA and bacteremia, ARTEMIO s/p CRRT and HD, and s/p Tracheostomy 12/18 c/b track leak and s/p OR 8Bivona. Transferred to [facility] for furhter management.     # AMS   - AOX3 at baseline.   - CTH and EEG WNL   - Now weaned off all sedation.   - Monitor mentation and supportive care     # ARDS second to COVID vs Multi-bacterial PNA/ R Lung Abscess  - Intubated 11/23 and course complicated with multi-bacterial PNA and lung abscesses.   - Lung abscess presented on CT CHEST 12/5  - s/p Tracheostomy on 12/18 by CTSx. Sutures out 1/1/2021  - Air leak noted and s/p Trach Exchange for Bivona with CTSx 12/31  - Currently on FULL Vent. PS 15/5/30 as tolerated.   - Proventil, IPV and Chest PT Q6H and Suction PRN. Trach Care QD.     # Vasoplegic vs Septic Shock   - Weaned off pressors. Monitor BP on Norvasc and Metoprolol.   - On full AC for Hx of DVT. CTA CHEST with no PEs.     # ARTEMIO s/p CRRT and HD   - ARTEMIO now resolved and no longer required HD.   - Hypokalemia in setting of diarrhea/ CDIFF. Monitor electrolytes.   - Monitor renal function and avoid nephrotoxins.     # GI   - Initially CTSx and GI deferred PEG given Ventral Hernia   - s/p PEG placement with IR 1/11   - Continue on TF and monitor tolerance.   - Monitor diarrhea as CDIFF (+). Hold laxatives and continue on PO Vancomycin as below     # Sepsis second to COVID vs Multi-bacterial PNA/ Bacteremia and R Lung Abscess vs CDIFF (+)   - COVID with superimposed multi-bacterial VAP vs aspiration PNA vs cavitary PNA.   - SCx (11/24) MSSA and BCx (11/27) with MSSA and Proteus Bacteremia  - SCx (12/1, 12/12 and 12/27) with Stenotrophomonas.   - SCx (1/9) with  Pseudomonas and Stenotrophomonas   - BCX has been persistently negative from 12/1, and most recently 1/12   - CT CHEST 12/4 with right lung cavitation.   - s/p multiple antibiotics, but now completed Fortaz and Flagyl (1/12-1/16)  - RPT CT CHEST 1/28 with improvement of right lung abscess   - SCx with  Pseudomonas and Stenotrophomonas and s/p TED/ Fortaz (1/28-2/6)  - CDIFF (+) and started on PO Vancomycin (2/1 - 2/10)     # DM2   - Continue on ISS and NPH and monitor FS.   - Adjust insulin as needed.     # Hx of DVT   - Continue on Lovenox FULL DOSE for Xarelto for now.     # Skin/ Facial Wounds   - WOC recommendations appreciated   - Plastics evaluation and recommendations of facial wound appreciated     # DVT/ GI PPX with FULL AC/ PPI   # CODE STATUS - FULL CODE   # DISPO - PT recc Rehab/ Vent Facility  59 YO F with PMHx of HTN, DVT on Xarelto, Peritonitis s/p Oral's Procedure and Ileostomy (reversed in 2011) and AARON who presented to [facility] on 11/18 for AHRF second to COVID 19, intubated on 11/23 complicated by R lung abscesses on CT CHEST 12/5, multibacterial PNA and bacteremia, ARTEMIO s/p CRRT and HD, and s/p Tracheostomy 12/18 c/b track leak and s/p OR 8Bivona. Transferred to [facility] for furhter management.     # AMS   - AOX3 at baseline.   - CTH and EEG WNL   - Now weaned off all sedation.   - Monitor mentation and supportive care     # ARDS second to COVID vs Multi-bacterial PNA/ R Lung Abscess  - Intubated 11/23 and course complicated with multi-bacterial PNA and lung abscesses.   - Lung abscess presented on CT CHEST 12/5  - s/p Tracheostomy on 12/18 by CTSx. Sutures out 1/1/2021  - Air leak noted and s/p Trach Exchange for Bivona with CTSx 12/31  - Currently on FULL Vent. PS 8/5/30 as tolerated.   - Proventil, IPV and Chest PT Q6H and Suction PRN. Trach Care QD.     # Vasoplegic vs Septic Shock   - Weaned off pressors. Monitor BP on Norvasc and Metoprolol.   - On full AC for Hx of DVT. CTA CHEST with no PEs.     # ARTEMIO s/p CRRT and HD   - ARTEMIO now resolved and no longer required HD.   - Hypokalemia in setting of diarrhea/ CDIFF. Monitor electrolytes.   - Monitor renal function and avoid nephrotoxins.     # GI   - Initially CTSx and GI deferred PEG given Ventral Hernia   - s/p PEG placement with IR 1/11   - Continue on TF and monitor tolerance.   - Monitor diarrhea as CDIFF (+). Hold laxatives and continue on PO Vancomycin as below     # Sepsis second to COVID vs Multi-bacterial PNA/ Bacteremia and R Lung Abscess vs CDIFF (+)   - COVID with superimposed multi-bacterial VAP vs aspiration PNA vs cavitary PNA.   - SCx (11/24) MSSA and BCx (11/27) with MSSA and Proteus Bacteremia  - SCx (12/1, 12/12 and 12/27) with Stenotrophomonas.   - SCx (1/9) with  Pseudomonas and Stenotrophomonas   - BCX has been persistently negative from 12/1, and most recently 1/12   - CT CHEST 12/4 with right lung cavitation.   - s/p multiple antibiotics, but now completed Fortaz and Flagyl (1/12-1/16)  - RPT CT CHEST 1/28 with improvement of right lung abscess   - SCx with  Pseudomonas and Stenotrophomonas and s/p TED/ Fortaz (1/28-2/6)  - CDIFF (+) and started on PO Vancomycin (2/1 - 2/10)     # DM2   - Continue on ISS and NPH and monitor FS.   - Adjust insulin as needed.     # Hx of DVT   - Continue on Lovenox FULL DOSE for Xarelto for now.     # Skin/ Facial Wounds   - WOC recommendations appreciated   - Plastics evaluation and recommendations of facial wound appreciated     # DVT/ GI PPX with FULL AC/ PPI   # CODE STATUS - FULL CODE   # DISPO - PT recc Rehab/ Vent Facility

## 2021-02-08 NOTE — PROGRESS NOTE ADULT - ATTENDING COMMENTS
Agree with above. Seen and examined with team on rounds. COVID PNA. Complicated course, C Diff colitis. Supportive care.

## 2021-02-08 NOTE — PROGRESS NOTE ADULT - SUBJECTIVE AND OBJECTIVE BOX
CHIEF COMPLAINT: Patient is a 60y old  Female who presents with a chief complaint of COVID-19 (07 Feb 2021 07:37)    SUBJECTIVE: No interval events overnight.     [ ] All other systems negative  [ ] Unable to assess ROS because ________    OBJECTIVE:  ICU Vital Signs Last 24 Hrs  T(C): 36.8 (08 Feb 2021 05:01), Max: 37.1 (07 Feb 2021 17:59)  T(F): 98.3 (08 Feb 2021 05:01), Max: 98.7 (07 Feb 2021 17:59)  HR: 89 (08 Feb 2021 06:37) (83 - 108)  BP: 133/68 (08 Feb 2021 05:01) (133/68 - 159/63)  BP(mean): --  ABP: --  ABP(mean): --  RR: 21 (08 Feb 2021 05:01) (21 - 24)  SpO2: 100% (08 Feb 2021 06:37) (98% - 100%)    Mode: AC/ CMV (Assist Control/ Continuous Mandatory Ventilation), RR (machine): 12, TV (machine): 450, FiO2: 30, PEEP: 5, MAP: 9.5, PIP: 26    02-07 @ 07:01  -  02-08 @ 07:00  --------------------------------------------------------  IN: 1304 mL / OUT: 1200 mL / NET: 104 mL    CAPILLARY BLOOD GLUCOSE  POCT Blood Glucose.: 144 mg/dL (08 Feb 2021 05:18)    PHYSICAL EXAM:  General:   HEENT:   Lymph Nodes:  Neck:   Respiratory:   Cardiovascular:   Abdomen:   Extremities:   Skin:   Neurological:  Psychiatry:    HOSPITAL MEDICATIONS:  MEDICATIONS  (STANDING):  ALBUTerol    90 MICROgram(s) HFA Inhaler 2 Puff(s) Inhalation every 6 hours  amLODIPine   Tablet 5 milliGRAM(s) Oral daily  ascorbic acid 500 milliGRAM(s) Oral daily  chlorhexidine 4% Liquid 1 Application(s) Topical daily  collagenase Ointment 1 Application(s) Topical two times a day  dextrose 40% Gel 15 Gram(s) Oral once  dextrose 5%. 1000 milliLiter(s) (50 mL/Hr) IV Continuous <Continuous>  dextrose 5%. 1000 milliLiter(s) (100 mL/Hr) IV Continuous <Continuous>  dextrose 50% Injectable 25 Gram(s) IV Push once  dextrose 50% Injectable 12.5 Gram(s) IV Push once  dextrose 50% Injectable 25 Gram(s) IV Push once  enoxaparin Injectable 100 milliGRAM(s) SubCutaneous every 12 hours  glucagon  Injectable 1 milliGRAM(s) IntraMuscular once  insulin lispro (ADMELOG) corrective regimen sliding scale   SubCutaneous every 6 hours  lactobacillus acidophilus 1 Tablet(s) Oral two times a day  metoprolol tartrate 50 milliGRAM(s) Oral two times a day  multivitamin/minerals/iron Oral Solution (CENTRUM) 15 milliLiter(s) Oral daily  pantoprazole   Suspension 40 milliGRAM(s) Oral daily  vancomycin    Solution 125 milliGRAM(s) Oral every 6 hours  zinc sulfate 220 milliGRAM(s) Oral daily    MEDICATIONS  (PRN):  acetaminophen    Suspension .. 650 milliGRAM(s) Oral every 6 hours PRN Temp greater or equal to 38C (100.4F), Mild Pain (1 - 3), Moderate Pain (4 - 6)  artificial tears (preservative free) Ophthalmic Solution 1 Drop(s) Both EYES every 8 hours PRN Dry Eyes  oxyCODONE    Solution 5 milliGRAM(s) Oral every 4 hours PRN Severe Pain (7 - 10)    LABS:                        9.2    9.88  )-----------( 472      ( 07 Feb 2021 07:12 )             31.5     02-07    139  |  101  |  6<L>  ----------------------------<  140<H>  3.3<L>   |  26  |  0.20<L>    Ca    9.5      07 Feb 2021 07:12  Mg     1.6     02-07             CHIEF COMPLAINT: Patient is a 60y old  Female who presents with a chief complaint of COVID-19 (07 Feb 2021 07:37)    SUBJECTIVE: No interval events overnight. Seen by bedside and noted to be in good spirits. ROS limited second to vented, but denies pain or discomfort.     OBJECTIVE:  ICU Vital Signs Last 24 Hrs  T(C): 36.8 (08 Feb 2021 05:01), Max: 37.1 (07 Feb 2021 17:59)  T(F): 98.3 (08 Feb 2021 05:01), Max: 98.7 (07 Feb 2021 17:59)  HR: 89 (08 Feb 2021 06:37) (83 - 108)  BP: 133/68 (08 Feb 2021 05:01) (133/68 - 159/63)  BP(mean): --  ABP: --  ABP(mean): --  RR: 21 (08 Feb 2021 05:01) (21 - 24)  SpO2: 100% (08 Feb 2021 06:37) (98% - 100%)    Mode: AC/ CMV (Assist Control/ Continuous Mandatory Ventilation), RR (machine): 12, TV (machine): 450, FiO2: 30, PEEP: 5, MAP: 9.5, PIP: 26    02-07 @ 07:01  -  02-08 @ 07:00  --------------------------------------------------------  IN: 1304 mL / OUT: 1200 mL / NET: 104 mL    CAPILLARY BLOOD GLUCOSE  POCT Blood Glucose.: 144 mg/dL (08 Feb 2021 05:18)    PHYSICAL EXAM:  General: NAD and well groomed   HEENT: NC/ AT, PERRLA, Trach clean, dry and intact.   Cardio: RRR, S1/S2, no murmurs or rubs.   Pulm: Coarse vent sounds noted throughout, equal bilaterally.   GI: Soft, NDNT, BS (+). PEG (+)   MS: No pedal edema. AROMI but limited by weakness.     Neuro: Awake and alert. No focal neurological deficits noted.   Skin: Warm and dry. No jaundice or cyanosis     HOSPITAL MEDICATIONS:  MEDICATIONS  (STANDING):  ALBUTerol    90 MICROgram(s) HFA Inhaler 2 Puff(s) Inhalation every 6 hours  amLODIPine   Tablet 5 milliGRAM(s) Oral daily  ascorbic acid 500 milliGRAM(s) Oral daily  chlorhexidine 4% Liquid 1 Application(s) Topical daily  collagenase Ointment 1 Application(s) Topical two times a day  dextrose 40% Gel 15 Gram(s) Oral once  dextrose 5%. 1000 milliLiter(s) (50 mL/Hr) IV Continuous <Continuous>  dextrose 5%. 1000 milliLiter(s) (100 mL/Hr) IV Continuous <Continuous>  dextrose 50% Injectable 25 Gram(s) IV Push once  dextrose 50% Injectable 12.5 Gram(s) IV Push once  dextrose 50% Injectable 25 Gram(s) IV Push once  enoxaparin Injectable 100 milliGRAM(s) SubCutaneous every 12 hours  glucagon  Injectable 1 milliGRAM(s) IntraMuscular once  insulin lispro (ADMELOG) corrective regimen sliding scale   SubCutaneous every 6 hours  lactobacillus acidophilus 1 Tablet(s) Oral two times a day  metoprolol tartrate 50 milliGRAM(s) Oral two times a day  multivitamin/minerals/iron Oral Solution (CENTRUM) 15 milliLiter(s) Oral daily  pantoprazole   Suspension 40 milliGRAM(s) Oral daily  vancomycin    Solution 125 milliGRAM(s) Oral every 6 hours  zinc sulfate 220 milliGRAM(s) Oral daily    MEDICATIONS  (PRN):  acetaminophen    Suspension .. 650 milliGRAM(s) Oral every 6 hours PRN Temp greater or equal to 38C (100.4F), Mild Pain (1 - 3), Moderate Pain (4 - 6)  artificial tears (preservative free) Ophthalmic Solution 1 Drop(s) Both EYES every 8 hours PRN Dry Eyes  oxyCODONE    Solution 5 milliGRAM(s) Oral every 4 hours PRN Severe Pain (7 - 10)    LABS:                        9.2    9.88  )-----------( 472      ( 07 Feb 2021 07:12 )             31.5     02-07    139  |  101  |  6<L>  ----------------------------<  140<H>  3.3<L>   |  26  |  0.20<L>    Ca    9.5      07 Feb 2021 07:12  Mg     1.6     02-07

## 2021-02-09 LAB
ANION GAP SERPL CALC-SCNC: 11 MMOL/L — SIGNIFICANT CHANGE UP (ref 7–14)
BUN SERPL-MCNC: 8 MG/DL — SIGNIFICANT CHANGE UP (ref 7–23)
CALCIUM SERPL-MCNC: 10 MG/DL — SIGNIFICANT CHANGE UP (ref 8.4–10.5)
CHLORIDE SERPL-SCNC: 101 MMOL/L — SIGNIFICANT CHANGE UP (ref 98–107)
CO2 SERPL-SCNC: 22 MMOL/L — SIGNIFICANT CHANGE UP (ref 22–31)
CREAT SERPL-MCNC: 0.22 MG/DL — LOW (ref 0.5–1.3)
GLUCOSE BLDC GLUCOMTR-MCNC: 112 MG/DL — HIGH (ref 70–99)
GLUCOSE BLDC GLUCOMTR-MCNC: 113 MG/DL — HIGH (ref 70–99)
GLUCOSE BLDC GLUCOMTR-MCNC: 124 MG/DL — HIGH (ref 70–99)
GLUCOSE SERPL-MCNC: 113 MG/DL — HIGH (ref 70–99)
HCT VFR BLD CALC: 36.7 % — SIGNIFICANT CHANGE UP (ref 34.5–45)
HGB BLD-MCNC: 10.6 G/DL — LOW (ref 11.5–15.5)
MAGNESIUM SERPL-MCNC: 1.5 MG/DL — LOW (ref 1.6–2.6)
MCHC RBC-ENTMCNC: 27.7 PG — SIGNIFICANT CHANGE UP (ref 27–34)
MCHC RBC-ENTMCNC: 28.9 GM/DL — LOW (ref 32–36)
MCV RBC AUTO: 95.8 FL — SIGNIFICANT CHANGE UP (ref 80–100)
NRBC # BLD: 0 /100 WBCS — SIGNIFICANT CHANGE UP
NRBC # FLD: 0 K/UL — SIGNIFICANT CHANGE UP
PHOSPHATE SERPL-MCNC: 3.9 MG/DL — SIGNIFICANT CHANGE UP (ref 2.5–4.5)
PLATELET # BLD AUTO: 431 K/UL — HIGH (ref 150–400)
POTASSIUM SERPL-MCNC: 3.9 MMOL/L — SIGNIFICANT CHANGE UP (ref 3.5–5.3)
POTASSIUM SERPL-SCNC: 3.9 MMOL/L — SIGNIFICANT CHANGE UP (ref 3.5–5.3)
RBC # BLD: 3.83 M/UL — SIGNIFICANT CHANGE UP (ref 3.8–5.2)
RBC # FLD: 16.6 % — HIGH (ref 10.3–14.5)
SODIUM SERPL-SCNC: 134 MMOL/L — LOW (ref 135–145)
WBC # BLD: 8.82 K/UL — SIGNIFICANT CHANGE UP (ref 3.8–10.5)
WBC # FLD AUTO: 8.82 K/UL — SIGNIFICANT CHANGE UP (ref 3.8–10.5)

## 2021-02-09 PROCEDURE — 99233 SBSQ HOSP IP/OBS HIGH 50: CPT | Mod: GC

## 2021-02-09 PROCEDURE — 99223 1ST HOSP IP/OBS HIGH 75: CPT | Mod: GC

## 2021-02-09 PROCEDURE — 36569 INSJ PICC 5 YR+ W/O IMAGING: CPT

## 2021-02-09 RX ORDER — MAGNESIUM SULFATE 500 MG/ML
2 VIAL (ML) INJECTION ONCE
Refills: 0 | Status: COMPLETED | OUTPATIENT
Start: 2021-02-09 | End: 2021-02-09

## 2021-02-09 RX ADMIN — CHLORHEXIDINE GLUCONATE 1 APPLICATION(S): 213 SOLUTION TOPICAL at 12:07

## 2021-02-09 RX ADMIN — ALBUTEROL 2 PUFF(S): 90 AEROSOL, METERED ORAL at 22:24

## 2021-02-09 RX ADMIN — ALBUTEROL 2 PUFF(S): 90 AEROSOL, METERED ORAL at 10:54

## 2021-02-09 RX ADMIN — Medication 1 TABLET(S): at 12:07

## 2021-02-09 RX ADMIN — ENOXAPARIN SODIUM 100 MILLIGRAM(S): 100 INJECTION SUBCUTANEOUS at 16:41

## 2021-02-09 RX ADMIN — Medication 125 MILLIGRAM(S): at 12:08

## 2021-02-09 RX ADMIN — ALBUTEROL 2 PUFF(S): 90 AEROSOL, METERED ORAL at 04:29

## 2021-02-09 RX ADMIN — Medication 500 MILLIGRAM(S): at 12:07

## 2021-02-09 RX ADMIN — Medication 1 TABLET(S): at 16:43

## 2021-02-09 RX ADMIN — OXYCODONE HYDROCHLORIDE 5 MILLIGRAM(S): 5 TABLET ORAL at 12:18

## 2021-02-09 RX ADMIN — PANTOPRAZOLE SODIUM 40 MILLIGRAM(S): 20 TABLET, DELAYED RELEASE ORAL at 12:07

## 2021-02-09 RX ADMIN — Medication 1 TABLET(S): at 04:54

## 2021-02-09 RX ADMIN — ENOXAPARIN SODIUM 100 MILLIGRAM(S): 100 INJECTION SUBCUTANEOUS at 04:55

## 2021-02-09 RX ADMIN — Medication 1 APPLICATION(S): at 16:41

## 2021-02-09 RX ADMIN — Medication 125 MILLIGRAM(S): at 04:55

## 2021-02-09 RX ADMIN — Medication 125 MILLIGRAM(S): at 22:19

## 2021-02-09 RX ADMIN — Medication 50 MILLIGRAM(S): at 04:55

## 2021-02-09 RX ADMIN — ZINC SULFATE TAB 220 MG (50 MG ZINC EQUIVALENT) 220 MILLIGRAM(S): 220 (50 ZN) TAB at 12:07

## 2021-02-09 RX ADMIN — Medication 50 GRAM(S): at 08:48

## 2021-02-09 RX ADMIN — OXYCODONE HYDROCHLORIDE 5 MILLIGRAM(S): 5 TABLET ORAL at 22:20

## 2021-02-09 RX ADMIN — Medication 50 MILLIGRAM(S): at 16:42

## 2021-02-09 RX ADMIN — AMLODIPINE BESYLATE 5 MILLIGRAM(S): 2.5 TABLET ORAL at 04:55

## 2021-02-09 RX ADMIN — Medication 125 MILLIGRAM(S): at 16:42

## 2021-02-09 RX ADMIN — Medication 1 APPLICATION(S): at 04:54

## 2021-02-09 NOTE — PROCEDURE NOTE - ADDITIONAL PROCEDURE DETAILS
Patient requiring PIV access for medical management. PIV placed under US guidance, identified RUE vein, and successfully placed a 20G x 6cm Arrow Extended Dwelling Angiocath into vessel. Placement confirmed with US and catheter determined to be in patent lumen of vein. Secured. RN staff informed. Patient tolerated procedure well without complications.     Halima Paul PA-C  Department of Medicine/ [facility]   In House Pager #[phone]

## 2021-02-09 NOTE — PROGRESS NOTE ADULT - ASSESSMENT
61 YO F with PMHx of HTN, DVT on Xarelto, Peritonitis s/p Oral's Procedure and Ileostomy (reversed in 2011) and AARON who presented to [facility] on 11/18 for AHRF second to COVID 19, intubated on 11/23 complicated by R lung abscesses on CT CHEST 12/5, multibacterial PNA and bacteremia, ARTEMIO s/p CRRT and HD, and s/p Tracheostomy 12/18 c/b track leak and s/p OR 8Bivona. Transferred to [facility] for furhter management.     # AMS   - AOX3 at baseline.   - CTH and EEG WNL   - Now weaned off all sedation.   - Monitor mentation and supportive care     # ARDS second to COVID vs Multi-bacterial PNA/ R Lung Abscess  - Intubated 11/23 and course complicated with multi-bacterial PNA and lung abscesses.   - Lung abscess presented on CT CHEST 12/5  - s/p Tracheostomy on 12/18 by CTSx. Sutures out 1/1/2021  - Air leak noted and s/p Trach Exchange for Bivona with CTSx 12/31  - Currently on FULL Vent. PS 8/5/30 as tolerated.   - Proventil, IPV and Chest PT Q6H and Suction PRN. Trach Care QD.     # Vasoplegic vs Septic Shock   - Weaned off pressors. Monitor BP on Norvasc and Metoprolol.   - On full AC for Hx of DVT. CTA CHEST with no PEs.     # ARTEMIO s/p CRRT and HD   - ARTEMIO now resolved and no longer required HD.   - Hypokalemia in setting of diarrhea/ CDIFF. Monitor electrolytes.   - Monitor renal function and avoid nephrotoxins.     # GI   - Initially CTSx and GI deferred PEG given Ventral Hernia   - s/p PEG placement with IR 1/11   - Continue on TF and monitor tolerance.   - Monitor diarrhea as CDIFF (+). Hold laxatives and continue on PO Vancomycin as below     # Sepsis second to COVID vs Multi-bacterial PNA/ Bacteremia and R Lung Abscess vs CDIFF (+)   - COVID with superimposed multi-bacterial VAP vs aspiration PNA vs cavitary PNA.   - SCx (11/24) MSSA and BCx (11/27) with MSSA and Proteus Bacteremia  - SCx (12/1, 12/12 and 12/27) with Stenotrophomonas.   - SCx (1/9) with  Pseudomonas and Stenotrophomonas   - BCX has been persistently negative from 12/1, and most recently 1/12   - CT CHEST 12/4 with right lung cavitation.   - s/p multiple antibiotics, but now completed Fortaz and Flagyl (1/12-1/16)  - RPT CT CHEST 1/28 with improvement of right lung abscess   - SCx with  Pseudomonas and Stenotrophomonas and s/p TED/ Fortaz (1/28-2/6)  - CDIFF (+) and started on PO Vancomycin (2/1 - 2/10)     # DM2   - Continue on ISS and NPH and monitor FS.   - Adjust insulin as needed.     # Hx of DVT   - Continue on Lovenox FULL DOSE for Xarelto for now.     # Skin/ Facial Wounds   - WOC recommendations appreciated   - Plastics evaluation and recommendations of facial wound appreciated     # DVT/ GI PPX with FULL AC/ PPI   # CODE STATUS - FULL CODE   # DISPO - PT recc Rehab/ Vent Facility  59 YO F with PMHx of HTN, DVT on Xarelto, Peritonitis s/p Oral's Procedure and Ileostomy (reversed in 2011) and AARON who presented to [facility] on 11/18 for AHRF second to COVID 19, intubated on 11/23 complicated by R lung abscesses on CT CHEST 12/5, multibacterial PNA and bacteremia, ARTEMIO s/p CRRT and HD, and s/p Tracheostomy 12/18 c/b track leak and s/p OR 8Bivona. Transferred to [facility] for furhter management.     # AMS   - AOX3 at baseline.   - CTH and EEG WNL   - Now weaned off all sedation.   - Monitor mentation and supportive care     # ARDS second to COVID vs Multi-bacterial PNA/ R Lung Abscess  - Intubated 11/23 and course complicated with multi-bacterial PNA and lung abscesses.   - Lung abscess presented on CT CHEST 12/5  - s/p Tracheostomy on 12/18 by CTSx. Sutures out 1/1/2021  - Air leak noted and s/p Trach Exchange for Bivona with CTSx 12/31  - Currently on FULL Vent. PS 5/5/30 as tolerated.   - Proventil, IPV and Chest PT Q6H and Suction PRN. Trach Care QD.     # Vasoplegic vs Septic Shock   - Weaned off pressors. Monitor BP on Norvasc and Metoprolol.   - On full AC for Hx of DVT. CTA CHEST with no PEs.     # ARTEMIO s/p CRRT and HD   - ARTEMIO now resolved and no longer required HD.   - Hypokalemia in setting of diarrhea/ CDIFF. Monitor electrolytes.   - Monitor renal function and avoid nephrotoxins.     # GI   - Initially CTSx and GI deferred PEG given Ventral Hernia   - s/p PEG placement with IR 1/11   - Continue on TF and monitor tolerance.   - Monitor diarrhea as CDIFF (+). Hold laxatives and continue on PO Vancomycin as below     # Sepsis second to COVID vs Multi-bacterial PNA/ Bacteremia and R Lung Abscess vs CDIFF (+)   - COVID with superimposed multi-bacterial VAP vs aspiration PNA vs cavitary PNA.   - SCx (11/24) MSSA and BCx (11/27) with MSSA and Proteus Bacteremia  - SCx (12/1, 12/12 and 12/27) with Stenotrophomonas.   - SCx (1/9) with  Pseudomonas and Stenotrophomonas   - BCX has been persistently negative from 12/1, and most recently 1/12   - CT CHEST 12/4 with right lung cavitation.   - s/p multiple antibiotics, but now completed Fortaz and Flagyl (1/12-1/16)  - RPT CT CHEST 1/28 with improvement of right lung abscess   - SCx with  Pseudomonas and Stenotrophomonas and s/p TED/ Fortaz (1/28-2/6)  - CDIFF (+) and started on PO Vancomycin (2/1 - 2/10)     # DM2   - Continue on ISS and NPH and monitor FS.   - Adjust insulin as needed.     # Hx of DVT   - Continue on Lovenox FULL DOSE for Xarelto for now.     # Skin/ Facial Wounds   - WOC recommendations appreciated   - Plastics evaluation and recommendations of facial wound appreciated     # DVT/ GI PPX with FULL AC/ PPI   # CODE STATUS - FULL CODE   # DISPO - PT recc Rehab/ Vent Facility

## 2021-02-09 NOTE — PROCEDURE NOTE - NSPROCNAME_GEN_A_CORE
Arterial Puncture/Cannulation
Gastric Intubation/Gastric Lavage
Peripheral Line Insertion
Gastric Intubation/Gastric Lavage

## 2021-02-09 NOTE — PROCEDURE NOTE - NSINDICATIONS_GEN_A_CORE
critical patient/monitoring purposes/arterial puncture to obtain ABG's
feeds
antibiotic therapy/fluid administration
feeds

## 2021-02-09 NOTE — PROCEDURE NOTE - NSPROCDETAILS_GEN_ALL_CORE
nasogastric
location identified, draped/prepped, sterile technique used/blood seen on insertion/dressing applied/flushes easily/secured in place
location identified, draped/prepped, sterile technique used, needle inserted/introduced/positive blood return obtained via catheter
nasogastric/placement confirmed by auscultation/audible air bolus/gastric secretions aspirated, placement confirmed/bowel sounds present to 4 quadrants/connected to suction

## 2021-02-09 NOTE — PROGRESS NOTE ADULT - SUBJECTIVE AND OBJECTIVE BOX
CHIEF COMPLAINT: Patient is a 60y old  Female who presents with a chief complaint of Transfer from [facility] (08 Feb 2021 09:53)    SUBJECTIVE: No interval events overnight.     OBJECTIVE:  ICU Vital Signs Last 24 Hrs  T(C): 36.6 (09 Feb 2021 04:50), Max: 37.2 (08 Feb 2021 12:44)  T(F): 97.9 (09 Feb 2021 04:50), Max: 98.9 (08 Feb 2021 12:44)  HR: 89 (09 Feb 2021 06:09) (89 - 105)  BP: 147/71 (09 Feb 2021 04:50) (130/63 - 154/63)  BP(mean): --  ABP: --  ABP(mean): --  RR: 25 (09 Feb 2021 04:50) (22 - 25)  SpO2: 98% (09 Feb 2021 06:09) (95% - 100%)    Mode: AC/ CMV (Assist Control/ Continuous Mandatory Ventilation), RR (machine): 12, TV (machine): 450, FiO2: 27, PEEP: 5, MAP: 11, PIP: 26    02-08 @ 07:01  -  02-09 @ 07:00  --------------------------------------------------------  IN: 1304 mL / OUT: 450 mL / NET: 854 mL    CAPILLARY BLOOD GLUCOSE  POCT Blood Glucose.: 113 mg/dL (09 Feb 2021 05:33)    PHYSICAL EXAM:  General: NAD and well groomed   HEENT: NC/ AT, PERRLA, Trach clean, dry and intact.   Cardio: RRR, S1/S2, no murmurs or rubs.   Pulm: Coarse vent sounds noted throughout, equal bilaterally.   GI: Soft, NDNT, BS (+). PEG (+)   MS: No pedal edema. AROMI but limited by weakness.     Neuro: Awake and alert. No focal neurological deficits noted.   Skin: Warm and dry. No jaundice or cyanosis     HOSPITAL MEDICATIONS:  MEDICATIONS  (STANDING):  ALBUTerol    90 MICROgram(s) HFA Inhaler 2 Puff(s) Inhalation every 6 hours  amLODIPine   Tablet 5 milliGRAM(s) Oral daily  ascorbic acid 500 milliGRAM(s) Oral daily  chlorhexidine 4% Liquid 1 Application(s) Topical daily  collagenase Ointment 1 Application(s) Topical two times a day  dextrose 40% Gel 15 Gram(s) Oral once  dextrose 5%. 1000 milliLiter(s) (50 mL/Hr) IV Continuous <Continuous>  dextrose 5%. 1000 milliLiter(s) (100 mL/Hr) IV Continuous <Continuous>  dextrose 50% Injectable 25 Gram(s) IV Push once  dextrose 50% Injectable 12.5 Gram(s) IV Push once  dextrose 50% Injectable 25 Gram(s) IV Push once  enoxaparin Injectable 100 milliGRAM(s) SubCutaneous every 12 hours  glucagon  Injectable 1 milliGRAM(s) IntraMuscular once  insulin lispro (ADMELOG) corrective regimen sliding scale   SubCutaneous every 6 hours  lactobacillus acidophilus 1 Tablet(s) Oral two times a day  magnesium sulfate  IVPB 2 Gram(s) IV Intermittent once  metoprolol tartrate 50 milliGRAM(s) Oral two times a day  multivitamin 1 Tablet(s) Oral daily  pantoprazole   Suspension 40 milliGRAM(s) Oral daily  vancomycin    Solution 125 milliGRAM(s) Oral every 6 hours  zinc sulfate 220 milliGRAM(s) Oral daily    MEDICATIONS  (PRN):  acetaminophen    Suspension .. 650 milliGRAM(s) Oral every 6 hours PRN Temp greater or equal to 38C (100.4F), Mild Pain (1 - 3), Moderate Pain (4 - 6)  artificial tears (preservative free) Ophthalmic Solution 1 Drop(s) Both EYES every 8 hours PRN Dry Eyes  oxyCODONE    Solution 5 milliGRAM(s) Oral every 4 hours PRN Severe Pain (7 - 10)    LABS:                        10.6   8.82  )-----------( 431      ( 09 Feb 2021 06:52 )             36.7     02-09    134<L>  |  101  |  8   ----------------------------<  113<H>  3.9   |  22  |  0.22<L>    Ca    10.0      09 Feb 2021 06:52  Phos  3.9     02-09  Mg     1.5     02-09 CHIEF COMPLAINT: Patient is a 60y old  Female who presents with a chief complaint of Transfer from [facility] (08 Feb 2021 09:53)    SUBJECTIVE: No interval events overnight. Seen by bedside and noted to be pleasant as always. Notes generalized body weakness and lots of diarrhea. ROS limited second to vent.     OBJECTIVE:  ICU Vital Signs Last 24 Hrs  T(C): 36.6 (09 Feb 2021 04:50), Max: 37.2 (08 Feb 2021 12:44)  T(F): 97.9 (09 Feb 2021 04:50), Max: 98.9 (08 Feb 2021 12:44)  HR: 89 (09 Feb 2021 06:09) (89 - 105)  BP: 147/71 (09 Feb 2021 04:50) (130/63 - 154/63)  BP(mean): --  ABP: --  ABP(mean): --  RR: 25 (09 Feb 2021 04:50) (22 - 25)  SpO2: 98% (09 Feb 2021 06:09) (95% - 100%)    Mode: AC/ CMV (Assist Control/ Continuous Mandatory Ventilation), RR (machine): 12, TV (machine): 450, FiO2: 27, PEEP: 5, MAP: 11, PIP: 26    02-08 @ 07:01  -  02-09 @ 07:00  --------------------------------------------------------  IN: 1304 mL / OUT: 450 mL / NET: 854 mL    CAPILLARY BLOOD GLUCOSE  POCT Blood Glucose.: 113 mg/dL (09 Feb 2021 05:33)    PHYSICAL EXAM:  General: NAD and well groomed   HEENT: NC/ AT, PERRLA, Trach clean, dry and intact.   Cardio: RRR, S1/S2, no murmurs or rubs.   Pulm: Coarse vent sounds noted throughout, equal bilaterally.   GI: Soft, NDNT, BS (+). PEG (+)   MS: No pedal edema. AROMI but limited by weakness.     Neuro: Awake and alert. No focal neurological deficits noted.   Skin: Warm and dry. No jaundice or cyanosis     HOSPITAL MEDICATIONS:  MEDICATIONS  (STANDING):  ALBUTerol    90 MICROgram(s) HFA Inhaler 2 Puff(s) Inhalation every 6 hours  amLODIPine   Tablet 5 milliGRAM(s) Oral daily  ascorbic acid 500 milliGRAM(s) Oral daily  chlorhexidine 4% Liquid 1 Application(s) Topical daily  collagenase Ointment 1 Application(s) Topical two times a day  dextrose 40% Gel 15 Gram(s) Oral once  dextrose 5%. 1000 milliLiter(s) (50 mL/Hr) IV Continuous <Continuous>  dextrose 5%. 1000 milliLiter(s) (100 mL/Hr) IV Continuous <Continuous>  dextrose 50% Injectable 25 Gram(s) IV Push once  dextrose 50% Injectable 12.5 Gram(s) IV Push once  dextrose 50% Injectable 25 Gram(s) IV Push once  enoxaparin Injectable 100 milliGRAM(s) SubCutaneous every 12 hours  glucagon  Injectable 1 milliGRAM(s) IntraMuscular once  insulin lispro (ADMELOG) corrective regimen sliding scale   SubCutaneous every 6 hours  lactobacillus acidophilus 1 Tablet(s) Oral two times a day  magnesium sulfate  IVPB 2 Gram(s) IV Intermittent once  metoprolol tartrate 50 milliGRAM(s) Oral two times a day  multivitamin 1 Tablet(s) Oral daily  pantoprazole   Suspension 40 milliGRAM(s) Oral daily  vancomycin    Solution 125 milliGRAM(s) Oral every 6 hours  zinc sulfate 220 milliGRAM(s) Oral daily    MEDICATIONS  (PRN):  acetaminophen    Suspension .. 650 milliGRAM(s) Oral every 6 hours PRN Temp greater or equal to 38C (100.4F), Mild Pain (1 - 3), Moderate Pain (4 - 6)  artificial tears (preservative free) Ophthalmic Solution 1 Drop(s) Both EYES every 8 hours PRN Dry Eyes  oxyCODONE    Solution 5 milliGRAM(s) Oral every 4 hours PRN Severe Pain (7 - 10)    LABS:                        10.6   8.82  )-----------( 431      ( 09 Feb 2021 06:52 )             36.7     02-09    134<L>  |  101  |  8   ----------------------------<  113<H>  3.9   |  22  |  0.22<L>    Ca    10.0      09 Feb 2021 06:52  Phos  3.9     02-09  Mg     1.5     02-09

## 2021-02-10 LAB
GLUCOSE BLDC GLUCOMTR-MCNC: 113 MG/DL — HIGH (ref 70–99)
GLUCOSE BLDC GLUCOMTR-MCNC: 123 MG/DL — HIGH (ref 70–99)
GLUCOSE BLDC GLUCOMTR-MCNC: 127 MG/DL — HIGH (ref 70–99)
GLUCOSE BLDC GLUCOMTR-MCNC: 155 MG/DL — HIGH (ref 70–99)
GLUCOSE BLDC GLUCOMTR-MCNC: 162 MG/DL — HIGH (ref 70–99)

## 2021-02-10 PROCEDURE — 99223 1ST HOSP IP/OBS HIGH 75: CPT | Mod: GC

## 2021-02-10 PROCEDURE — 99233 SBSQ HOSP IP/OBS HIGH 50: CPT | Mod: GC

## 2021-02-10 RX ORDER — VANCOMYCIN HCL 1 G
125 VIAL (EA) INTRAVENOUS EVERY 6 HOURS
Refills: 0 | Status: DISCONTINUED | OUTPATIENT
Start: 2021-02-10 | End: 2021-02-10

## 2021-02-10 RX ORDER — VANCOMYCIN HCL 1 G
125 VIAL (EA) INTRAVENOUS EVERY 6 HOURS
Refills: 0 | Status: DISCONTINUED | OUTPATIENT
Start: 2021-02-10 | End: 2021-02-12

## 2021-02-10 RX ADMIN — Medication 50 MILLIGRAM(S): at 17:27

## 2021-02-10 RX ADMIN — Medication 50 MILLIGRAM(S): at 06:00

## 2021-02-10 RX ADMIN — Medication 2: at 12:04

## 2021-02-10 RX ADMIN — Medication 500 MILLIGRAM(S): at 12:05

## 2021-02-10 RX ADMIN — Medication 125 MILLIGRAM(S): at 17:27

## 2021-02-10 RX ADMIN — OXYCODONE HYDROCHLORIDE 5 MILLIGRAM(S): 5 TABLET ORAL at 23:06

## 2021-02-10 RX ADMIN — ALBUTEROL 2 PUFF(S): 90 AEROSOL, METERED ORAL at 16:54

## 2021-02-10 RX ADMIN — Medication 125 MILLIGRAM(S): at 12:04

## 2021-02-10 RX ADMIN — ALBUTEROL 2 PUFF(S): 90 AEROSOL, METERED ORAL at 21:32

## 2021-02-10 RX ADMIN — CHLORHEXIDINE GLUCONATE 1 APPLICATION(S): 213 SOLUTION TOPICAL at 12:05

## 2021-02-10 RX ADMIN — Medication 2: at 06:21

## 2021-02-10 RX ADMIN — ZINC SULFATE TAB 220 MG (50 MG ZINC EQUIVALENT) 220 MILLIGRAM(S): 220 (50 ZN) TAB at 12:05

## 2021-02-10 RX ADMIN — Medication 1 APPLICATION(S): at 06:00

## 2021-02-10 RX ADMIN — AMLODIPINE BESYLATE 5 MILLIGRAM(S): 2.5 TABLET ORAL at 06:00

## 2021-02-10 RX ADMIN — Medication 1 TABLET(S): at 12:04

## 2021-02-10 RX ADMIN — ENOXAPARIN SODIUM 100 MILLIGRAM(S): 100 INJECTION SUBCUTANEOUS at 17:26

## 2021-02-10 RX ADMIN — ALBUTEROL 2 PUFF(S): 90 AEROSOL, METERED ORAL at 04:13

## 2021-02-10 RX ADMIN — Medication 125 MILLIGRAM(S): at 23:06

## 2021-02-10 RX ADMIN — Medication 1 TABLET(S): at 17:27

## 2021-02-10 RX ADMIN — ALBUTEROL 2 PUFF(S): 90 AEROSOL, METERED ORAL at 11:49

## 2021-02-10 RX ADMIN — OXYCODONE HYDROCHLORIDE 5 MILLIGRAM(S): 5 TABLET ORAL at 12:46

## 2021-02-10 RX ADMIN — Medication 1 TABLET(S): at 06:00

## 2021-02-10 RX ADMIN — Medication 125 MILLIGRAM(S): at 06:00

## 2021-02-10 RX ADMIN — ENOXAPARIN SODIUM 100 MILLIGRAM(S): 100 INJECTION SUBCUTANEOUS at 06:01

## 2021-02-10 RX ADMIN — PANTOPRAZOLE SODIUM 40 MILLIGRAM(S): 20 TABLET, DELAYED RELEASE ORAL at 12:05

## 2021-02-10 RX ADMIN — Medication 1 APPLICATION(S): at 17:27

## 2021-02-10 NOTE — PROGRESS NOTE ADULT - SUBJECTIVE AND OBJECTIVE BOX
CHIEF COMPLAINT: Patient is a 60y old  Female who presents with a chief complaint of Transfer from [facility] (09 Feb 2021 08:25)      Interval Events: Pt seen and evaluated     REVIEW OF SYSTEMS:  Constitutional:   Eyes:  ENT:  CV:  Resp:  GI:  :  MSK:  Integumentary:  Neurological:  Psychiatric:  Endocrine:  Hematologic/Lymphatic:  Allergic/Immunologic:  [ ] All other systems negative  [ ] Unable to assess ROS because ________    OBJECTIVE:  ICU Vital Signs Last 24 Hrs  T(C): 36.8 (10 Feb 2021 05:53), Max: 37.1 (09 Feb 2021 16:40)  T(F): 98.2 (10 Feb 2021 05:53), Max: 98.8 (09 Feb 2021 16:40)  HR: 89 (10 Feb 2021 06:41) (85 - 108)  BP: 148/68 (10 Feb 2021 05:53) (134/69 - 148/68)  BP(mean): --  ABP: --  ABP(mean): --  RR: 22 (10 Feb 2021 05:53) (19 - 24)  SpO2: 97% (10 Feb 2021 06:41) (97% - 100%)    Mode: AC/ CMV (Assist Control/ Continuous Mandatory Ventilation), RR (machine): 12, TV (machine): 450, FiO2: 25, PEEP: 5, MAP: 12, PIP: 23    02-09 @ 07:01  -  02-10 @ 07:00  --------------------------------------------------------  IN: 1354 mL / OUT: 990 mL / NET: 364 mL      CAPILLARY BLOOD GLUCOSE      POCT Blood Glucose.: 155 mg/dL (10 Feb 2021 06:09)      PHYSICAL EXAM:  General:   HEENT:   Lymph Nodes:  Neck:   Respiratory:   Cardiovascular:   Abdomen:   Extremities:   Skin:   Neurological:  Psychiatry:    HOSPITAL MEDICATIONS:  MEDICATIONS  (STANDING):  ALBUTerol    90 MICROgram(s) HFA Inhaler 2 Puff(s) Inhalation every 6 hours  amLODIPine   Tablet 5 milliGRAM(s) Oral daily  ascorbic acid 500 milliGRAM(s) Oral daily  chlorhexidine 4% Liquid 1 Application(s) Topical daily  collagenase Ointment 1 Application(s) Topical two times a day  dextrose 40% Gel 15 Gram(s) Oral once  dextrose 5%. 1000 milliLiter(s) (50 mL/Hr) IV Continuous <Continuous>  dextrose 5%. 1000 milliLiter(s) (100 mL/Hr) IV Continuous <Continuous>  dextrose 50% Injectable 25 Gram(s) IV Push once  dextrose 50% Injectable 12.5 Gram(s) IV Push once  dextrose 50% Injectable 25 Gram(s) IV Push once  enoxaparin Injectable 100 milliGRAM(s) SubCutaneous every 12 hours  glucagon  Injectable 1 milliGRAM(s) IntraMuscular once  insulin lispro (ADMELOG) corrective regimen sliding scale   SubCutaneous every 6 hours  lactobacillus acidophilus 1 Tablet(s) Oral two times a day  metoprolol tartrate 50 milliGRAM(s) Oral two times a day  multivitamin 1 Tablet(s) Oral daily  pantoprazole   Suspension 40 milliGRAM(s) Oral daily  vancomycin    Solution 125 milliGRAM(s) Oral every 6 hours  zinc sulfate 220 milliGRAM(s) Oral daily    MEDICATIONS  (PRN):  acetaminophen    Suspension .. 650 milliGRAM(s) Oral every 6 hours PRN Temp greater or equal to 38C (100.4F), Mild Pain (1 - 3), Moderate Pain (4 - 6)  artificial tears (preservative free) Ophthalmic Solution 1 Drop(s) Both EYES every 8 hours PRN Dry Eyes  oxyCODONE    Solution 5 milliGRAM(s) Oral every 4 hours PRN Severe Pain (7 - 10)      LABS:                        10.6   8.82  )-----------( 431      ( 09 Feb 2021 06:52 )             36.7     02-09    134<L>  |  101  |  8   ----------------------------<  113<H>  3.9   |  22  |  0.22<L>    Ca    10.0      09 Feb 2021 06:52  Phos  3.9     02-09  Mg     1.5     02-09                MICROBIOLOGY:     RADIOLOGY:  [ ] Reviewed and interpreted by me    PULMONARY FUNCTION TESTS:    EKG: CHIEF COMPLAINT: Patient is a 60y old  Female who presents with a chief complaint of Transfer from [facility] (09 Feb 2021 08:25)      Interval Events: Pt seen and evaluated     REVIEW OF SYSTEMS:  Constitutional: Denies pain/chills   CV: Denies   Resp: Denies   GI: Denies   MSK: Muscle pain with PT   [x ] All other systems negative  [ ] Unable to assess ROS because ________    OBJECTIVE:  ICU Vital Signs Last 24 Hrs  T(C): 36.8 (10 Feb 2021 05:53), Max: 37.1 (09 Feb 2021 16:40)  T(F): 98.2 (10 Feb 2021 05:53), Max: 98.8 (09 Feb 2021 16:40)  HR: 89 (10 Feb 2021 06:41) (85 - 108)  BP: 148/68 (10 Feb 2021 05:53) (134/69 - 148/68)  BP(mean): --  ABP: --  ABP(mean): --  RR: 22 (10 Feb 2021 05:53) (19 - 24)  SpO2: 97% (10 Feb 2021 06:41) (97% - 100%)    Mode: AC/ CMV (Assist Control/ Continuous Mandatory Ventilation), RR (machine): 12, TV (machine): 450, FiO2: 25, PEEP: 5, MAP: 12, PIP: 23    02-09 @ 07:01  -  02-10 @ 07:00  --------------------------------------------------------  IN: 1354 mL / OUT: 990 mL / NET: 364 mL      CAPILLARY BLOOD GLUCOSE      POCT Blood Glucose.: 155 mg/dL (10 Feb 2021 06:09)      HOSPITAL MEDICATIONS:  MEDICATIONS  (STANDING):  ALBUTerol    90 MICROgram(s) HFA Inhaler 2 Puff(s) Inhalation every 6 hours  amLODIPine   Tablet 5 milliGRAM(s) Oral daily  ascorbic acid 500 milliGRAM(s) Oral daily  chlorhexidine 4% Liquid 1 Application(s) Topical daily  collagenase Ointment 1 Application(s) Topical two times a day  dextrose 40% Gel 15 Gram(s) Oral once  dextrose 5%. 1000 milliLiter(s) (50 mL/Hr) IV Continuous <Continuous>  dextrose 5%. 1000 milliLiter(s) (100 mL/Hr) IV Continuous <Continuous>  dextrose 50% Injectable 25 Gram(s) IV Push once  dextrose 50% Injectable 12.5 Gram(s) IV Push once  dextrose 50% Injectable 25 Gram(s) IV Push once  enoxaparin Injectable 100 milliGRAM(s) SubCutaneous every 12 hours  glucagon  Injectable 1 milliGRAM(s) IntraMuscular once  insulin lispro (ADMELOG) corrective regimen sliding scale   SubCutaneous every 6 hours  lactobacillus acidophilus 1 Tablet(s) Oral two times a day  metoprolol tartrate 50 milliGRAM(s) Oral two times a day  multivitamin 1 Tablet(s) Oral daily  pantoprazole   Suspension 40 milliGRAM(s) Oral daily  vancomycin    Solution 125 milliGRAM(s) Oral every 6 hours  zinc sulfate 220 milliGRAM(s) Oral daily    MEDICATIONS  (PRN):  acetaminophen    Suspension .. 650 milliGRAM(s) Oral every 6 hours PRN Temp greater or equal to 38C (100.4F), Mild Pain (1 - 3), Moderate Pain (4 - 6)  artificial tears (preservative free) Ophthalmic Solution 1 Drop(s) Both EYES every 8 hours PRN Dry Eyes  oxyCODONE    Solution 5 milliGRAM(s) Oral every 4 hours PRN Severe Pain (7 - 10)      LABS:                        10.6   8.82  )-----------( 431      ( 09 Feb 2021 06:52 )             36.7     02-09    134<L>  |  101  |  8   ----------------------------<  113<H>  3.9   |  22  |  0.22<L>    Ca    10.0      09 Feb 2021 06:52  Phos  3.9     02-09  Mg     1.5     02-09                MICROBIOLOGY:     RADIOLOGY:  [ ] Reviewed and interpreted by me    PULMONARY FUNCTION TESTS:    EKG:

## 2021-02-10 NOTE — PROGRESS NOTE ADULT - ASSESSMENT
59 YO F with PMHx of HTN, DVT on Xarelto, Peritonitis s/p Oral's Procedure and Ileostomy (reversed in 2011) and AARON who presented to [facility] on 11/18 for AHRF second to COVID 19, intubated on 11/23 complicated by R lung abscesses on CT CHEST 12/5, multibacterial PNA and bacteremia, ARTEMIO s/p CRRT and HD, and s/p Tracheostomy 12/18 c/b track leak and s/p OR 8Bivona. Transferred to [facility] for furhter management.     # AMS   - AOX3 at baseline.   - CTH and EEG WNL   - Now weaned off all sedation.   - Monitor mentation and supportive care     # ARDS second to COVID vs Multi-bacterial PNA/ R Lung Abscess  - Intubated 11/23 and course complicated with multi-bacterial PNA and lung abscesses.   - Lung abscess presented on CT CHEST 12/5  - s/p Tracheostomy on 12/18 by CTSx. Sutures out 1/1/2021  - Air leak noted and s/p Trach Exchange for Bivona with CTSx 12/31  - Currently on FULL Vent. PS 5/5/30 as tolerated.   - Proventil, IPV and Chest PT Q6H and Suction PRN. Trach Care QD.     # Vasoplegic vs Septic Shock   - Weaned off pressors. Monitor BP on Norvasc and Metoprolol.   - On full AC for Hx of DVT. CTA CHEST with no PEs.     # ARTEMIO s/p CRRT and HD   - ARTEMIO now resolved and no longer required HD.   - Hypokalemia in setting of diarrhea/ CDIFF. Monitor electrolytes.   - Monitor renal function and avoid nephrotoxins.     # GI   - Initially CTSx and GI deferred PEG given Ventral Hernia   - s/p PEG placement with IR 1/11   - Continue on TF and monitor tolerance.   - Monitor diarrhea as CDIFF (+). Hold laxatives and continue on PO Vancomycin as below     # Sepsis second to COVID vs Multi-bacterial PNA/ Bacteremia and R Lung Abscess vs CDIFF (+)   - COVID with superimposed multi-bacterial VAP vs aspiration PNA vs cavitary PNA.   - SCx (11/24) MSSA and BCx (11/27) with MSSA and Proteus Bacteremia  - SCx (12/1, 12/12 and 12/27) with Stenotrophomonas.   - SCx (1/9) with  Pseudomonas and Stenotrophomonas   - BCX has been persistently negative from 12/1, and most recently 1/12   - CT CHEST 12/4 with right lung cavitation.   - s/p multiple antibiotics, but now completed Fortaz and Flagyl (1/12-1/16)  - RPT CT CHEST 1/28 with improvement of right lung abscess   - SCx with  Pseudomonas and Stenotrophomonas and s/p TED/ Fortaz (1/28-2/6)  - CDIFF (+) and started on PO Vancomycin (2/1 - 2/10)     # DM2   - Continue on ISS and NPH and monitor FS.   - Adjust insulin as needed.     # Hx of DVT   - Continue on Lovenox FULL DOSE for Xarelto for now.     # Skin/ Facial Wounds   - WOC recommendations appreciated   - Plastics evaluation and recommendations of facial wound appreciated     # DVT/ GI PPX with FULL AC/ PPI   # CODE STATUS - FULL CODE   # DISPO - PT recc Rehab/ Vent Facility  59 YO F with PMHx of HTN, DVT on Xarelto, Peritonitis s/p Oral's Procedure and Ileostomy (reversed in 2011) and AARON who presented to [facility] on 11/18 for AHRF second to COVID 19, intubated on 11/23 complicated by R lung abscesses on CT CHEST 12/5, multibacterial PNA and bacteremia, ARTEMIO s/p CRRT and HD, and s/p Tracheostomy 12/18 c/b track leak and s/p OR 8Bivona. Transferred to [facility] for furhter management.     # AMS   - AOX3 at baseline.   - CTH and EEG WNL   - Now weaned off all sedation.   - Monitor mentation and supportive care     # ARDS second to COVID vs Multi-bacterial PNA/ R Lung Abscess  - Intubated 11/23 and course complicated with multi-bacterial PNA and lung abscesses.   - Lung abscess presented on CT CHEST 12/5  - s/p Tracheostomy on 12/18 by CTSx. Sutures out 1/1/2021  - Air leak noted and s/p Trach Exchange for Bivona with CTSx 12/31  - Currently on FULL Vent. PS 5/5/30 as tolerated.   - Proventil, IPV and Chest PT Q6H and Suction PRN. Trach Care QD.     # Vasoplegic vs Septic Shock   - Weaned off pressors. Monitor BP on Norvasc and Metoprolol.   - On full AC for Hx of DVT. CTA CHEST with no PEs.     # ARTEMIO s/p CRRT and HD   - ARTEMIO now resolved and no longer required HD.   - Hypokalemia in setting of diarrhea/ CDIFF. Monitor electrolytes.   - Monitor renal function and avoid nephrotoxins.     # GI   - Initially CTSx and GI deferred PEG given Ventral Hernia   - s/p PEG placement with IR 1/11   - Continue on TF and monitor tolerance.   - Monitor diarrhea as CDIFF (+). Hold laxatives and continue on PO Vancomycin as below     # Sepsis second to COVID vs Multi-bacterial PNA/ Bacteremia and R Lung Abscess vs CDIFF (+)   - COVID with superimposed multi-bacterial VAP vs aspiration PNA vs cavitary PNA.   - SCx (11/24) MSSA and BCx (11/27) with MSSA and Proteus Bacteremia  - SCx (12/1, 12/12 and 12/27) with Stenotrophomonas.   - SCx (1/9) with  Pseudomonas and Stenotrophomonas   - BCX has been persistently negative from 12/1, and most recently 1/12   - CT CHEST 12/4 with right lung cavitation.   - s/p multiple antibiotics, but now completed Fortaz and Flagyl (1/12-1/16)  - RPT CT CHEST 1/28 with improvement of right lung abscess   - SCx with  Pseudomonas and Stenotrophomonas and s/p TED/ Fortaz (1/28-2/6)  - CDIFF (+) and started on PO Vancomycin (2/1 - 2/10) dosing extending to 14 days until 2/14     # DM2   - Continue on ISS and NPH and monitor FS.   - Adjust insulin as needed.     # Hx of DVT   - Continue on Lovenox FULL DOSE for Xarelto for now.     # Skin/ Facial Wounds   - WOC recommendations appreciated   - Plastics evaluation and recommendations of facial wound appreciated     # DVT/ GI PPX with FULL AC/ PPI   # CODE STATUS - FULL CODE   # DISPO - PT recc Rehab/ Vent Facility

## 2021-02-10 NOTE — PROGRESS NOTE ADULT - ATTENDING COMMENTS
Agree with above. Seen and examined with team on rounds. On C diff treatment. CPAP trials as tolerated. Start work on placement.

## 2021-02-11 LAB
ANION GAP SERPL CALC-SCNC: 13 MMOL/L — SIGNIFICANT CHANGE UP (ref 7–14)
BUN SERPL-MCNC: 9 MG/DL — SIGNIFICANT CHANGE UP (ref 7–23)
CALCIUM SERPL-MCNC: 9.7 MG/DL — SIGNIFICANT CHANGE UP (ref 8.4–10.5)
CHLORIDE SERPL-SCNC: 102 MMOL/L — SIGNIFICANT CHANGE UP (ref 98–107)
CO2 SERPL-SCNC: 22 MMOL/L — SIGNIFICANT CHANGE UP (ref 22–31)
CREAT SERPL-MCNC: 0.22 MG/DL — LOW (ref 0.5–1.3)
GLUCOSE BLDC GLUCOMTR-MCNC: 135 MG/DL — HIGH (ref 70–99)
GLUCOSE BLDC GLUCOMTR-MCNC: 140 MG/DL — HIGH (ref 70–99)
GLUCOSE BLDC GLUCOMTR-MCNC: 177 MG/DL — HIGH (ref 70–99)
GLUCOSE SERPL-MCNC: 147 MG/DL — HIGH (ref 70–99)
HCT VFR BLD CALC: 34.5 % — SIGNIFICANT CHANGE UP (ref 34.5–45)
HGB BLD-MCNC: 10.1 G/DL — LOW (ref 11.5–15.5)
MAGNESIUM SERPL-MCNC: 1.6 MG/DL — SIGNIFICANT CHANGE UP (ref 1.6–2.6)
MCHC RBC-ENTMCNC: 27.5 PG — SIGNIFICANT CHANGE UP (ref 27–34)
MCHC RBC-ENTMCNC: 29.3 GM/DL — LOW (ref 32–36)
MCV RBC AUTO: 94 FL — SIGNIFICANT CHANGE UP (ref 80–100)
NRBC # BLD: 0 /100 WBCS — SIGNIFICANT CHANGE UP
NRBC # FLD: 0 K/UL — SIGNIFICANT CHANGE UP
PHOSPHATE SERPL-MCNC: 3.3 MG/DL — SIGNIFICANT CHANGE UP (ref 2.5–4.5)
PLATELET # BLD AUTO: 394 K/UL — SIGNIFICANT CHANGE UP (ref 150–400)
POTASSIUM SERPL-MCNC: 3.6 MMOL/L — SIGNIFICANT CHANGE UP (ref 3.5–5.3)
POTASSIUM SERPL-SCNC: 3.6 MMOL/L — SIGNIFICANT CHANGE UP (ref 3.5–5.3)
RBC # BLD: 3.67 M/UL — LOW (ref 3.8–5.2)
RBC # FLD: 16.6 % — HIGH (ref 10.3–14.5)
SODIUM SERPL-SCNC: 137 MMOL/L — SIGNIFICANT CHANGE UP (ref 135–145)
WBC # BLD: 8.98 K/UL — SIGNIFICANT CHANGE UP (ref 3.8–10.5)
WBC # FLD AUTO: 8.98 K/UL — SIGNIFICANT CHANGE UP (ref 3.8–10.5)

## 2021-02-11 PROCEDURE — 99223 1ST HOSP IP/OBS HIGH 75: CPT | Mod: GC

## 2021-02-11 RX ORDER — OXYCODONE HYDROCHLORIDE 5 MG/1
5 TABLET ORAL EVERY 4 HOURS
Refills: 0 | Status: DISCONTINUED | OUTPATIENT
Start: 2021-02-11 | End: 2021-02-18

## 2021-02-11 RX ADMIN — PANTOPRAZOLE SODIUM 40 MILLIGRAM(S): 20 TABLET, DELAYED RELEASE ORAL at 12:58

## 2021-02-11 RX ADMIN — Medication 1 DROP(S): at 06:01

## 2021-02-11 RX ADMIN — CHLORHEXIDINE GLUCONATE 1 APPLICATION(S): 213 SOLUTION TOPICAL at 12:58

## 2021-02-11 RX ADMIN — ALBUTEROL 2 PUFF(S): 90 AEROSOL, METERED ORAL at 10:25

## 2021-02-11 RX ADMIN — Medication 125 MILLIGRAM(S): at 18:49

## 2021-02-11 RX ADMIN — Medication 125 MILLIGRAM(S): at 06:04

## 2021-02-11 RX ADMIN — AMLODIPINE BESYLATE 5 MILLIGRAM(S): 2.5 TABLET ORAL at 06:04

## 2021-02-11 RX ADMIN — ENOXAPARIN SODIUM 100 MILLIGRAM(S): 100 INJECTION SUBCUTANEOUS at 06:01

## 2021-02-11 RX ADMIN — Medication 2: at 12:57

## 2021-02-11 RX ADMIN — ZINC SULFATE TAB 220 MG (50 MG ZINC EQUIVALENT) 220 MILLIGRAM(S): 220 (50 ZN) TAB at 12:58

## 2021-02-11 RX ADMIN — Medication 1 TABLET(S): at 06:00

## 2021-02-11 RX ADMIN — OXYCODONE HYDROCHLORIDE 5 MILLIGRAM(S): 5 TABLET ORAL at 18:49

## 2021-02-11 RX ADMIN — ALBUTEROL 2 PUFF(S): 90 AEROSOL, METERED ORAL at 04:08

## 2021-02-11 RX ADMIN — ALBUTEROL 2 PUFF(S): 90 AEROSOL, METERED ORAL at 21:28

## 2021-02-11 RX ADMIN — ALBUTEROL 2 PUFF(S): 90 AEROSOL, METERED ORAL at 17:12

## 2021-02-11 RX ADMIN — OXYCODONE HYDROCHLORIDE 5 MILLIGRAM(S): 5 TABLET ORAL at 06:00

## 2021-02-11 RX ADMIN — Medication 1 APPLICATION(S): at 18:49

## 2021-02-11 RX ADMIN — Medication 125 MILLIGRAM(S): at 12:58

## 2021-02-11 RX ADMIN — Medication 1 APPLICATION(S): at 06:02

## 2021-02-11 RX ADMIN — Medication 500 MILLIGRAM(S): at 12:58

## 2021-02-11 RX ADMIN — ENOXAPARIN SODIUM 100 MILLIGRAM(S): 100 INJECTION SUBCUTANEOUS at 18:49

## 2021-02-11 RX ADMIN — Medication 1 TABLET(S): at 12:58

## 2021-02-11 RX ADMIN — Medication 1 TABLET(S): at 18:49

## 2021-02-11 RX ADMIN — Medication 50 MILLIGRAM(S): at 18:49

## 2021-02-11 RX ADMIN — Medication 50 MILLIGRAM(S): at 06:00

## 2021-02-11 NOTE — PROGRESS NOTE ADULT - ASSESSMENT
61 YO F with PMHx of HTN, DVT on Xarelto, Peritonitis s/p Oral's Procedure and Ileostomy (reversed in 2011) and AARON who presented to [facility] on 11/18 for AHRF second to COVID 19, intubated on 11/23 complicated by R lung abscesses on CT CHEST 12/5, multibacterial PNA and bacteremia, ARTEMIO s/p CRRT and HD, and s/p Tracheostomy 12/18 c/b track leak and s/p OR 8Bivona. Transferred to [facility] for furhter management.     # AMS   - AOX3 at baseline.   - CTH and EEG WNL   - Now weaned off all sedation.   - Monitor mentation and supportive care     # ARDS second to COVID vs Multi-bacterial PNA/ R Lung Abscess  - Intubated 11/23 and course complicated with multi-bacterial PNA and lung abscesses.   - Lung abscess presented on CT CHEST 12/5  - s/p Tracheostomy on 12/18 by CTSx. Sutures out 1/1/2021  - Air leak noted and s/p Trach Exchange for Bivona with CTSx 12/31  - Currently on FULL Vent. PS 5/5/30 as tolerated.   - Proventil, IPV and Chest PT Q6H and Suction PRN. Trach Care QD.     # Vasoplegic vs Septic Shock   - Weaned off pressors. Monitor BP on Norvasc and Metoprolol.   - On full AC for Hx of DVT. CTA CHEST with no PEs.     # ARTEMIO s/p CRRT and HD   - ARTEMIO now resolved and no longer required HD.   - Hypokalemia in setting of diarrhea/ CDIFF. Monitor electrolytes.   - Monitor renal function and avoid nephrotoxins.     # GI   - Initially CTSx and GI deferred PEG given Ventral Hernia   - s/p PEG placement with IR 1/11   - Continue on TF and monitor tolerance.   - Monitor diarrhea as CDIFF (+). Hold laxatives and continue on PO Vancomycin as below     # Sepsis second to COVID vs Multi-bacterial PNA/ Bacteremia and R Lung Abscess vs CDIFF (+)   - COVID with superimposed multi-bacterial VAP vs aspiration PNA vs cavitary PNA.   - SCx (11/24) MSSA and BCx (11/27) with MSSA and Proteus Bacteremia  - SCx (12/1, 12/12 and 12/27) with Stenotrophomonas.   - SCx (1/9) with  Pseudomonas and Stenotrophomonas   - BCX has been persistently negative from 12/1, and most recently 1/12   - CT CHEST 12/4 with right lung cavitation.   - s/p multiple antibiotics, but now completed Fortaz and Flagyl (1/12-1/16)  - RPT CT CHEST 1/28 with improvement of right lung abscess   - SCx with  Pseudomonas and Stenotrophomonas and s/p TED/ Fortaz (1/28-2/6)  - CDIFF (+) and started on PO Vancomycin (2/1 - 2/10) dosing extending to 14 days until 2/14     # DM2   - Continue on ISS and NPH and monitor FS.   - Adjust insulin as needed.     # Hx of DVT   - Continue on Lovenox FULL DOSE for Xarelto for now.     # Skin/ Facial Wounds   - WOC recommendations appreciated   - Plastics evaluation and recommendations of facial wound appreciated     # DVT/ GI PPX with FULL AC/ PPI   # CODE STATUS - FULL CODE   # DISPO - PT recc Rehab/ Vent Facility

## 2021-02-11 NOTE — PROGRESS NOTE ADULT - SUBJECTIVE AND OBJECTIVE BOX
CHIEF COMPLAINT:    Interval Events:    REVIEW OF SYSTEMS:  Constitutional:   Eyes:  ENT:  CV:  Resp:  GI:  :  MSK:  Integumentary:  Neurological:  Psychiatric:  Endocrine:  Hematologic/Lymphatic:  Allergic/Immunologic:  [ ] All other systems negative  [ ] Unable to assess ROS because ________    OBJECTIVE:  ICU Vital Signs Last 24 Hrs  T(C): 37.4 (11 Feb 2021 06:00), Max: 37.4 (10 Feb 2021 20:00)  T(F): 99.3 (11 Feb 2021 06:00), Max: 99.3 (10 Feb 2021 20:00)  HR: 100 (11 Feb 2021 10:19) (86 - 105)  BP: 146/69 (11 Feb 2021 06:00) (112/59 - 146/69)  BP(mean): 71 (11 Feb 2021 06:00) (64 - 71)  ABP: --  ABP(mean): --  RR: 23 (11 Feb 2021 06:00) (20 - 24)  SpO2: 100% (11 Feb 2021 10:19) (96% - 100%)    Mode: CPAP with PS, FiO2: 25, PEEP: 5, PS: 5, MAP: 7.6, PIP: 12    02-10 @ 07:01  -  02-11 @ 07:00  --------------------------------------------------------  IN: 1100 mL / OUT: 500 mL / NET: 600 mL      CAPILLARY BLOOD GLUCOSE      POCT Blood Glucose.: 135 mg/dL (11 Feb 2021 00:29)      PHYSICAL EXAM:  General:   HEENT:   Lymph Nodes:  Neck:   Respiratory:   Cardiovascular:   Abdomen:   Extremities:   Skin:   Neurological:  Psychiatry:    HOSPITAL MEDICATIONS:  MEDICATIONS  (STANDING):  ALBUTerol    90 MICROgram(s) HFA Inhaler 2 Puff(s) Inhalation every 6 hours  amLODIPine   Tablet 5 milliGRAM(s) Oral daily  ascorbic acid 500 milliGRAM(s) Oral daily  chlorhexidine 4% Liquid 1 Application(s) Topical daily  collagenase Ointment 1 Application(s) Topical two times a day  dextrose 40% Gel 15 Gram(s) Oral once  dextrose 5%. 1000 milliLiter(s) (50 mL/Hr) IV Continuous <Continuous>  dextrose 5%. 1000 milliLiter(s) (100 mL/Hr) IV Continuous <Continuous>  dextrose 50% Injectable 25 Gram(s) IV Push once  dextrose 50% Injectable 12.5 Gram(s) IV Push once  dextrose 50% Injectable 25 Gram(s) IV Push once  enoxaparin Injectable 100 milliGRAM(s) SubCutaneous every 12 hours  glucagon  Injectable 1 milliGRAM(s) IntraMuscular once  insulin lispro (ADMELOG) corrective regimen sliding scale   SubCutaneous every 6 hours  lactobacillus acidophilus 1 Tablet(s) Oral two times a day  metoprolol tartrate 50 milliGRAM(s) Oral two times a day  multivitamin 1 Tablet(s) Oral daily  pantoprazole   Suspension 40 milliGRAM(s) Oral daily  vancomycin    Solution 125 milliGRAM(s) Oral every 6 hours  zinc sulfate 220 milliGRAM(s) Oral daily    MEDICATIONS  (PRN):  acetaminophen    Suspension .. 650 milliGRAM(s) Oral every 6 hours PRN Temp greater or equal to 38C (100.4F), Mild Pain (1 - 3), Moderate Pain (4 - 6)  artificial tears (preservative free) Ophthalmic Solution 1 Drop(s) Both EYES every 8 hours PRN Dry Eyes  oxyCODONE    Solution 5 milliGRAM(s) Oral every 4 hours PRN Severe Pain (7 - 10)      LABS:                        10.1   8.98  )-----------( 394      ( 11 Feb 2021 07:28 )             34.5     02-11    137  |  102  |  9   ----------------------------<  147<H>  3.6   |  22  |  0.22<L>    Ca    9.7      11 Feb 2021 07:28  Phos  3.3     02-11  Mg     1.6     02-11                MICROBIOLOGY:     RADIOLOGY:  [ ] Reviewed and interpreted by me    PULMONARY FUNCTION TESTS:    EKG: CHIEF COMPLAINT: Patient is a 60y old  Female who presents with a chief complaint of Transfer from [facility] (11 Feb 2021 11:02)      Interval Events: Pt seen and evaluated by team .  Loose stool continues     REVIEW OF SYSTEMS:  Constitutional: Denies pain   CV: Denies   Resp: Denies   GI: + loose stool   [x ] All other systems negative    OBJECTIVE:  ICU Vital Signs Last 24 Hrs  T(C): 37.4 (11 Feb 2021 06:00), Max: 37.4 (10 Feb 2021 20:00)  T(F): 99.3 (11 Feb 2021 06:00), Max: 99.3 (10 Feb 2021 20:00)  HR: 100 (11 Feb 2021 10:19) (86 - 105)  BP: 146/69 (11 Feb 2021 06:00) (112/59 - 146/69)  BP(mean): 71 (11 Feb 2021 06:00) (64 - 71)  ABP: --  ABP(mean): --  RR: 23 (11 Feb 2021 06:00) (20 - 24)  SpO2: 100% (11 Feb 2021 10:19) (96% - 100%)    Mode: CPAP with PS, FiO2: 25, PEEP: 5, PS: 5, MAP: 7.6, PIP: 12    02-10 @ 07:01  -  02-11 @ 07:00  --------------------------------------------------------  IN: 1100 mL / OUT: 500 mL / NET: 600 mL      CAPILLARY BLOOD GLUCOSE      POCT Blood Glucose.: 135 mg/dL (11 Feb 2021 00:29)        HOSPITAL MEDICATIONS:  MEDICATIONS  (STANDING):  ALBUTerol    90 MICROgram(s) HFA Inhaler 2 Puff(s) Inhalation every 6 hours  amLODIPine   Tablet 5 milliGRAM(s) Oral daily  ascorbic acid 500 milliGRAM(s) Oral daily  chlorhexidine 4% Liquid 1 Application(s) Topical daily  collagenase Ointment 1 Application(s) Topical two times a day  dextrose 40% Gel 15 Gram(s) Oral once  dextrose 5%. 1000 milliLiter(s) (50 mL/Hr) IV Continuous <Continuous>  dextrose 5%. 1000 milliLiter(s) (100 mL/Hr) IV Continuous <Continuous>  dextrose 50% Injectable 25 Gram(s) IV Push once  dextrose 50% Injectable 12.5 Gram(s) IV Push once  dextrose 50% Injectable 25 Gram(s) IV Push once  enoxaparin Injectable 100 milliGRAM(s) SubCutaneous every 12 hours  glucagon  Injectable 1 milliGRAM(s) IntraMuscular once  insulin lispro (ADMELOG) corrective regimen sliding scale   SubCutaneous every 6 hours  lactobacillus acidophilus 1 Tablet(s) Oral two times a day  metoprolol tartrate 50 milliGRAM(s) Oral two times a day  multivitamin 1 Tablet(s) Oral daily  pantoprazole   Suspension 40 milliGRAM(s) Oral daily  vancomycin    Solution 125 milliGRAM(s) Oral every 6 hours  zinc sulfate 220 milliGRAM(s) Oral daily    MEDICATIONS  (PRN):  acetaminophen    Suspension .. 650 milliGRAM(s) Oral every 6 hours PRN Temp greater or equal to 38C (100.4F), Mild Pain (1 - 3), Moderate Pain (4 - 6)  artificial tears (preservative free) Ophthalmic Solution 1 Drop(s) Both EYES every 8 hours PRN Dry Eyes  oxyCODONE    Solution 5 milliGRAM(s) Oral every 4 hours PRN Severe Pain (7 - 10)      LABS:                        10.1   8.98  )-----------( 394      ( 11 Feb 2021 07:28 )             34.5     02-11    137  |  102  |  9   ----------------------------<  147<H>  3.6   |  22  |  0.22<L>    Ca    9.7      11 Feb 2021 07:28  Phos  3.3     02-11  Mg     1.6     02-11                MICROBIOLOGY:     RADIOLOGY:  [ ] Reviewed and interpreted by me    PULMONARY FUNCTION TESTS:    EKG:

## 2021-02-11 NOTE — PROGRESS NOTE ADULT - ATTENDING COMMENTS
Agree with above. Seen and examined with team on rounds. Still on treatment for C Diff. Supportive care and transfer to facility after treatment for C Diff shows some improvement.

## 2021-02-12 LAB
GLUCOSE BLDC GLUCOMTR-MCNC: 109 MG/DL — HIGH (ref 70–99)
GLUCOSE BLDC GLUCOMTR-MCNC: 125 MG/DL — HIGH (ref 70–99)
GLUCOSE BLDC GLUCOMTR-MCNC: 131 MG/DL — HIGH (ref 70–99)
GLUCOSE BLDC GLUCOMTR-MCNC: 144 MG/DL — HIGH (ref 70–99)
GLUCOSE BLDC GLUCOMTR-MCNC: 161 MG/DL — HIGH (ref 70–99)

## 2021-02-12 PROCEDURE — 99223 1ST HOSP IP/OBS HIGH 75: CPT | Mod: GC

## 2021-02-12 PROCEDURE — 99233 SBSQ HOSP IP/OBS HIGH 50: CPT | Mod: GC

## 2021-02-12 RX ORDER — VANCOMYCIN HCL 1 G
250 VIAL (EA) INTRAVENOUS EVERY 6 HOURS
Refills: 0 | Status: DISCONTINUED | OUTPATIENT
Start: 2021-02-12 | End: 2021-02-19

## 2021-02-12 RX ORDER — VANCOMYCIN HCL 1 G
250 VIAL (EA) INTRAVENOUS EVERY 6 HOURS
Refills: 0 | Status: DISCONTINUED | OUTPATIENT
Start: 2021-02-12 | End: 2021-02-12

## 2021-02-12 RX ORDER — CHOLESTYRAMINE 4 G/9G
4 POWDER, FOR SUSPENSION ORAL DAILY
Refills: 0 | Status: COMPLETED | OUTPATIENT
Start: 2021-02-12 | End: 2021-02-19

## 2021-02-12 RX ADMIN — ENOXAPARIN SODIUM 100 MILLIGRAM(S): 100 INJECTION SUBCUTANEOUS at 17:27

## 2021-02-12 RX ADMIN — Medication 50 MILLIGRAM(S): at 06:16

## 2021-02-12 RX ADMIN — Medication 125 MILLIGRAM(S): at 06:16

## 2021-02-12 RX ADMIN — Medication 125 MILLIGRAM(S): at 01:36

## 2021-02-12 RX ADMIN — Medication 1 TABLET(S): at 12:06

## 2021-02-12 RX ADMIN — ALBUTEROL 2 PUFF(S): 90 AEROSOL, METERED ORAL at 03:41

## 2021-02-12 RX ADMIN — Medication 0: at 06:18

## 2021-02-12 RX ADMIN — AMLODIPINE BESYLATE 5 MILLIGRAM(S): 2.5 TABLET ORAL at 06:16

## 2021-02-12 RX ADMIN — OXYCODONE HYDROCHLORIDE 5 MILLIGRAM(S): 5 TABLET ORAL at 23:47

## 2021-02-12 RX ADMIN — Medication 250 MILLIGRAM(S): at 23:43

## 2021-02-12 RX ADMIN — ALBUTEROL 2 PUFF(S): 90 AEROSOL, METERED ORAL at 11:34

## 2021-02-12 RX ADMIN — Medication 1 TABLET(S): at 17:26

## 2021-02-12 RX ADMIN — OXYCODONE HYDROCHLORIDE 5 MILLIGRAM(S): 5 TABLET ORAL at 08:43

## 2021-02-12 RX ADMIN — OXYCODONE HYDROCHLORIDE 5 MILLIGRAM(S): 5 TABLET ORAL at 01:40

## 2021-02-12 RX ADMIN — Medication 500 MILLIGRAM(S): at 12:05

## 2021-02-12 RX ADMIN — Medication 125 MILLIGRAM(S): at 12:06

## 2021-02-12 RX ADMIN — Medication 50 MILLIGRAM(S): at 17:27

## 2021-02-12 RX ADMIN — ENOXAPARIN SODIUM 100 MILLIGRAM(S): 100 INJECTION SUBCUTANEOUS at 06:16

## 2021-02-12 RX ADMIN — ALBUTEROL 2 PUFF(S): 90 AEROSOL, METERED ORAL at 16:32

## 2021-02-12 RX ADMIN — Medication 1 APPLICATION(S): at 06:19

## 2021-02-12 RX ADMIN — Medication 250 MILLIGRAM(S): at 17:27

## 2021-02-12 RX ADMIN — Medication 1 APPLICATION(S): at 17:27

## 2021-02-12 RX ADMIN — ZINC SULFATE TAB 220 MG (50 MG ZINC EQUIVALENT) 220 MILLIGRAM(S): 220 (50 ZN) TAB at 12:05

## 2021-02-12 RX ADMIN — ALBUTEROL 2 PUFF(S): 90 AEROSOL, METERED ORAL at 22:19

## 2021-02-12 RX ADMIN — CHOLESTYRAMINE 4 GRAM(S): 4 POWDER, FOR SUSPENSION ORAL at 17:47

## 2021-02-12 RX ADMIN — CHLORHEXIDINE GLUCONATE 1 APPLICATION(S): 213 SOLUTION TOPICAL at 12:06

## 2021-02-12 RX ADMIN — Medication 2: at 12:05

## 2021-02-12 RX ADMIN — PANTOPRAZOLE SODIUM 40 MILLIGRAM(S): 20 TABLET, DELAYED RELEASE ORAL at 12:05

## 2021-02-12 RX ADMIN — Medication 1 TABLET(S): at 06:16

## 2021-02-12 NOTE — PROGRESS NOTE ADULT - ASSESSMENT
59 YO F with PMHx of HTN, DVT on Xarelto, Peritonitis s/p Oral's Procedure and Ileostomy (reversed in 2011) and AARON who presented to [facility] on 11/18 for AHRF second to COVID 19, intubated on 11/23 complicated by R lung abscesses on CT CHEST 12/5, multibacterial PNA and bacteremia, ARTEMIO s/p CRRT and HD, and s/p Tracheostomy 12/18 c/b track leak and s/p OR 8Bivona. Transferred to [facility] for furhter management.     # AMS   - AOX3 at baseline.   - CTH and EEG WNL   - Now weaned off all sedation.   - Monitor mentation and supportive care     # ARDS second to COVID vs Multi-bacterial PNA/ R Lung Abscess  - Intubated 11/23 and course complicated with multi-bacterial PNA and lung abscesses.   - Lung abscess presented on CT CHEST 12/5  - s/p Tracheostomy on 12/18 by CTSx. Sutures out 1/1/2021  - Air leak noted and s/p Trach Exchange for Bivona with CTSx 12/31  - Currently on FULL Vent. PS 5/5/30 as tolerated.   - Proventil, IPV and Chest PT Q6H and Suction PRN. Trach Care QD.     # Vasoplegic vs Septic Shock   - Weaned off pressors. Monitor BP on Norvasc and Metoprolol.   - On full AC for Hx of DVT. CTA CHEST with no PEs.     # ARTEMIO s/p CRRT and HD   - ARTEMIO now resolved and no longer required HD.   - Hypokalemia in setting of diarrhea/ CDIFF. Monitor electrolytes.   - Monitor renal function and avoid nephrotoxins.     # GI   - Initially CTSx and GI deferred PEG given Ventral Hernia   - s/p PEG placement with IR 1/11   - Continue on TF and monitor tolerance.   - Monitor diarrhea as CDIFF (+). Hold laxatives and continue on PO Vancomycin as below     # Sepsis second to COVID vs Multi-bacterial PNA/ Bacteremia and R Lung Abscess vs CDIFF (+)   - COVID with superimposed multi-bacterial VAP vs aspiration PNA vs cavitary PNA.   - SCx (11/24) MSSA and BCx (11/27) with MSSA and Proteus Bacteremia  - SCx (12/1, 12/12 and 12/27) with Stenotrophomonas.   - SCx (1/9) with  Pseudomonas and Stenotrophomonas   - BCX has been persistently negative from 12/1, and most recently 1/12   - CT CHEST 12/4 with right lung cavitation.   - s/p multiple antibiotics, but now completed Fortaz and Flagyl (1/12-1/16)  - RPT CT CHEST 1/28 with improvement of right lung abscess   - SCx with  Pseudomonas and Stenotrophomonas and s/p TED/ Fortaz (1/28-2/6)    # CDIFF (+)   started on PO Vancomycin (2/1 - 2/10) dosing extending to 14 days until 2/14     # DM2   - Continue on ISS and NPH and monitor FS.   - Adjust insulin as needed.     # Hx of DVT   - Continue on Lovenox FULL DOSE for Xarelto for now.     # Skin/ Facial Wounds   - WOC recommendations appreciated   - Plastics evaluation and recommendations of facial wound appreciated     # DVT/ GI PPX with FULL AC/ PPI   # CODE STATUS - FULL CODE   # DISPO - PT recc Rehab/ Vent Facility  61 YO F with PMHx of HTN, DVT on Xarelto, Peritonitis s/p Oral's Procedure and Ileostomy (reversed in 2011) and AARON who presented to [facility] on 11/18 for AHRF second to COVID 19, intubated on 11/23 complicated by R lung abscesses on CT CHEST 12/5, multibacterial PNA and bacteremia, ARTEMIO s/p CRRT and HD, and s/p Tracheostomy 12/18 c/b track leak and s/p OR 8Bivona. Transferred to [facility] for furhter management.     # AMS   - AOX3 at baseline.   - CTH and EEG WNL   - Now weaned off all sedation.   - Monitor mentation and supportive care     # ARDS second to COVID vs Multi-bacterial PNA/ R Lung Abscess  - Intubated 11/23 and course complicated with multi-bacterial PNA and lung abscesses.   - Lung abscess presented on CT CHEST 12/5  - s/p Tracheostomy on 12/18 by CTSx. Sutures out 1/1/2021  - Air leak noted and s/p Trach Exchange for Bivona with CTSx 12/31  - Currently on FULL Vent. PS 5/5/30 as tolerated.   - Proventil, IPV and Chest PT Q6H and Suction PRN. Trach Care QD.     # Vasoplegic vs Septic Shock   - Weaned off pressors. Monitor BP on Norvasc and Metoprolol.   - On full AC for Hx of DVT. CTA CHEST with no PEs.     # ARTEMIO s/p CRRT and HD   - ARTEMIO now resolved and no longer required HD.   - Hypokalemia in setting of diarrhea/ CDIFF. Monitor electrolytes.   - Monitor renal function and avoid nephrotoxins.     # GI   - Initially CTSx and GI deferred PEG given Ventral Hernia   - s/p PEG placement with IR 1/11   - Continue on TF and monitor tolerance.   - Monitor diarrhea as CDIFF (+). Hold laxatives and continue on PO Vancomycin as below     # Sepsis second to COVID vs Multi-bacterial PNA/ Bacteremia and R Lung Abscess vs CDIFF (+)   - COVID with superimposed multi-bacterial VAP vs aspiration PNA vs cavitary PNA.   - SCx (11/24) MSSA and BCx (11/27) with MSSA and Proteus Bacteremia  - SCx (12/1, 12/12 and 12/27) with Stenotrophomonas.   - SCx (1/9) with  Pseudomonas and Stenotrophomonas   - BCX has been persistently negative from 12/1, and most recently 1/12   - CT CHEST 12/4 with right lung cavitation.   - s/p multiple antibiotics, but now completed Fortaz and Flagyl (1/12-1/16)  - RPT CT CHEST 1/28 with improvement of right lung abscess   - SCx with  Pseudomonas and Stenotrophomonas and s/p TED/ Fortaz (1/28-2/6)    # CDIFF (+)   started on PO Vancomycin (2/1 - 2/10) dosing extending to 14 days until 2/14   - Case d/w Dr. Burgos - will increase to 250mg q6h due to body size   - Questran daily to help with bulking stool     # DM2   - Continue on ISS and NPH and monitor FS.   - Adjust insulin as needed.     # Hx of DVT   - Continue on Lovenox FULL DOSE for Xarelto for now.     # Skin/ Facial Wounds   - WOC recommendations appreciated   - Plastics evaluation and recommendations of facial wound appreciated     # DVT/ GI PPX with FULL AC/ PPI   # CODE STATUS - FULL CODE   # DISPO - PT recc Rehab/ Vent Facility

## 2021-02-12 NOTE — PROGRESS NOTE ADULT - ATTENDING COMMENTS
Agree with above. Seen and examined with team on rounds. C diff on therapy. Will discuss with ID regarding duration of therapy. Supportive care.

## 2021-02-12 NOTE — PROGRESS NOTE ADULT - SUBJECTIVE AND OBJECTIVE BOX
CHIEF COMPLAINT: Patient is a 60y old  Female who presents with a chief complaint of Transfer from [facility] (11 Feb 2021 11:02)      Interval Events: Pt seen and evaluated at bedside     REVIEW OF SYSTEMS:  Constitutional:   Eyes:  ENT:  CV:  Resp:  GI:  :  MSK:  Integumentary:  Neurological:  Psychiatric:  Endocrine:  Hematologic/Lymphatic:  Allergic/Immunologic:  [x ] All other systems negative      OBJECTIVE:  ICU Vital Signs Last 24 Hrs  T(C): 36.7 (12 Feb 2021 04:00), Max: 37.6 (11 Feb 2021 18:47)  T(F): 98.1 (12 Feb 2021 04:00), Max: 99.7 (11 Feb 2021 18:47)  HR: 85 (12 Feb 2021 07:02) (85 - 110)  BP: 140/61 (12 Feb 2021 04:00) (140/57 - 148/64)  BP(mean): 82 (12 Feb 2021 04:00) (80 - 82)  ABP: --  ABP(mean): --  RR: 23 (11 Feb 2021 18:47) (22 - 23)  SpO2: 97% (12 Feb 2021 07:02) (96% - 100%)    Mode: AC/ CMV (Assist Control/ Continuous Mandatory Ventilation), RR (machine): 12, TV (machine): 450, FiO2: 25, PEEP: 5, MAP: 10, PIP: 21    02-11 @ 07:01  -  02-12 @ 07:00  --------------------------------------------------------  IN: 504 mL / OUT: 600 mL / NET: -96 mL      CAPILLARY BLOOD GLUCOSE      POCT Blood Glucose.: 144 mg/dL (12 Feb 2021 05:29)      HOSPITAL MEDICATIONS:  MEDICATIONS  (STANDING):  ALBUTerol    90 MICROgram(s) HFA Inhaler 2 Puff(s) Inhalation every 6 hours  amLODIPine   Tablet 5 milliGRAM(s) Oral daily  ascorbic acid 500 milliGRAM(s) Oral daily  chlorhexidine 4% Liquid 1 Application(s) Topical daily  collagenase Ointment 1 Application(s) Topical two times a day  dextrose 40% Gel 15 Gram(s) Oral once  dextrose 5%. 1000 milliLiter(s) (50 mL/Hr) IV Continuous <Continuous>  dextrose 5%. 1000 milliLiter(s) (100 mL/Hr) IV Continuous <Continuous>  dextrose 50% Injectable 25 Gram(s) IV Push once  dextrose 50% Injectable 12.5 Gram(s) IV Push once  dextrose 50% Injectable 25 Gram(s) IV Push once  enoxaparin Injectable 100 milliGRAM(s) SubCutaneous every 12 hours  glucagon  Injectable 1 milliGRAM(s) IntraMuscular once  insulin lispro (ADMELOG) corrective regimen sliding scale   SubCutaneous every 6 hours  lactobacillus acidophilus 1 Tablet(s) Oral two times a day  metoprolol tartrate 50 milliGRAM(s) Oral two times a day  multivitamin 1 Tablet(s) Oral daily  pantoprazole   Suspension 40 milliGRAM(s) Oral daily  vancomycin    Solution 125 milliGRAM(s) Oral every 6 hours  zinc sulfate 220 milliGRAM(s) Oral daily    MEDICATIONS  (PRN):  acetaminophen    Suspension .. 650 milliGRAM(s) Oral every 6 hours PRN Temp greater or equal to 38C (100.4F), Mild Pain (1 - 3), Moderate Pain (4 - 6)  artificial tears (preservative free) Ophthalmic Solution 1 Drop(s) Both EYES every 8 hours PRN Dry Eyes  oxyCODONE    Solution 5 milliGRAM(s) Oral every 4 hours PRN Severe Pain (7 - 10)      LABS:                        10.1   8.98  )-----------( 394      ( 11 Feb 2021 07:28 )             34.5     02-11    137  |  102  |  9   ----------------------------<  147<H>  3.6   |  22  |  0.22<L>    Ca    9.7      11 Feb 2021 07:28  Phos  3.3     02-11  Mg     1.6     02-11                MICROBIOLOGY:     RADIOLOGY:  [ ] Reviewed and interpreted by me    PULMONARY FUNCTION TESTS:    EKG: CHIEF COMPLAINT: Patient is a 60y old  Female who presents with a chief complaint of Transfer from [facility] (11 Feb 2021 11:02)      Interval Events: Pt seen and evaluated at bedside     REVIEW OF SYSTEMS:  Constitutional: no pain/chills   ENT: Denies   CV: Denies   Resp: Denies   GI: loose stool   Integumentary: Dry itchy skin ( will bring dove soap)  [x ] All other systems negative      OBJECTIVE:  ICU Vital Signs Last 24 Hrs  T(C): 36.7 (12 Feb 2021 04:00), Max: 37.6 (11 Feb 2021 18:47)  T(F): 98.1 (12 Feb 2021 04:00), Max: 99.7 (11 Feb 2021 18:47)  HR: 85 (12 Feb 2021 07:02) (85 - 110)  BP: 140/61 (12 Feb 2021 04:00) (140/57 - 148/64)  BP(mean): 82 (12 Feb 2021 04:00) (80 - 82)  ABP: --  ABP(mean): --  RR: 23 (11 Feb 2021 18:47) (22 - 23)  SpO2: 97% (12 Feb 2021 07:02) (96% - 100%)    Mode: AC/ CMV (Assist Control/ Continuous Mandatory Ventilation), RR (machine): 12, TV (machine): 450, FiO2: 25, PEEP: 5, MAP: 10, PIP: 21    02-11 @ 07:01  -  02-12 @ 07:00  --------------------------------------------------------  IN: 504 mL / OUT: 600 mL / NET: -96 mL      CAPILLARY BLOOD GLUCOSE      POCT Blood Glucose.: 144 mg/dL (12 Feb 2021 05:29)      HOSPITAL MEDICATIONS:  MEDICATIONS  (STANDING):  ALBUTerol    90 MICROgram(s) HFA Inhaler 2 Puff(s) Inhalation every 6 hours  amLODIPine   Tablet 5 milliGRAM(s) Oral daily  ascorbic acid 500 milliGRAM(s) Oral daily  chlorhexidine 4% Liquid 1 Application(s) Topical daily  collagenase Ointment 1 Application(s) Topical two times a day  dextrose 40% Gel 15 Gram(s) Oral once  dextrose 5%. 1000 milliLiter(s) (50 mL/Hr) IV Continuous <Continuous>  dextrose 5%. 1000 milliLiter(s) (100 mL/Hr) IV Continuous <Continuous>  dextrose 50% Injectable 25 Gram(s) IV Push once  dextrose 50% Injectable 12.5 Gram(s) IV Push once  dextrose 50% Injectable 25 Gram(s) IV Push once  enoxaparin Injectable 100 milliGRAM(s) SubCutaneous every 12 hours  glucagon  Injectable 1 milliGRAM(s) IntraMuscular once  insulin lispro (ADMELOG) corrective regimen sliding scale   SubCutaneous every 6 hours  lactobacillus acidophilus 1 Tablet(s) Oral two times a day  metoprolol tartrate 50 milliGRAM(s) Oral two times a day  multivitamin 1 Tablet(s) Oral daily  pantoprazole   Suspension 40 milliGRAM(s) Oral daily  vancomycin    Solution 125 milliGRAM(s) Oral every 6 hours  zinc sulfate 220 milliGRAM(s) Oral daily    MEDICATIONS  (PRN):  acetaminophen    Suspension .. 650 milliGRAM(s) Oral every 6 hours PRN Temp greater or equal to 38C (100.4F), Mild Pain (1 - 3), Moderate Pain (4 - 6)  artificial tears (preservative free) Ophthalmic Solution 1 Drop(s) Both EYES every 8 hours PRN Dry Eyes  oxyCODONE    Solution 5 milliGRAM(s) Oral every 4 hours PRN Severe Pain (7 - 10)      LABS:                        10.1   8.98  )-----------( 394      ( 11 Feb 2021 07:28 )             34.5     02-11    137  |  102  |  9   ----------------------------<  147<H>  3.6   |  22  |  0.22<L>    Ca    9.7      11 Feb 2021 07:28  Phos  3.3     02-11  Mg     1.6     02-11                MICROBIOLOGY:     RADIOLOGY:  [ ] Reviewed and interpreted by me    PULMONARY FUNCTION TESTS:    EKG:

## 2021-02-13 LAB
ANION GAP SERPL CALC-SCNC: 12 MMOL/L — SIGNIFICANT CHANGE UP (ref 7–14)
BUN SERPL-MCNC: 10 MG/DL — SIGNIFICANT CHANGE UP (ref 7–23)
CALCIUM SERPL-MCNC: 9.5 MG/DL — SIGNIFICANT CHANGE UP (ref 8.4–10.5)
CHLORIDE SERPL-SCNC: 104 MMOL/L — SIGNIFICANT CHANGE UP (ref 98–107)
CO2 SERPL-SCNC: 22 MMOL/L — SIGNIFICANT CHANGE UP (ref 22–31)
CREAT SERPL-MCNC: 0.24 MG/DL — LOW (ref 0.5–1.3)
GLUCOSE BLDC GLUCOMTR-MCNC: 108 MG/DL — HIGH (ref 70–99)
GLUCOSE BLDC GLUCOMTR-MCNC: 129 MG/DL — HIGH (ref 70–99)
GLUCOSE BLDC GLUCOMTR-MCNC: 141 MG/DL — HIGH (ref 70–99)
GLUCOSE BLDC GLUCOMTR-MCNC: 142 MG/DL — HIGH (ref 70–99)
GLUCOSE SERPL-MCNC: 140 MG/DL — HIGH (ref 70–99)
HCT VFR BLD CALC: 34.2 % — LOW (ref 34.5–45)
HGB BLD-MCNC: 10.1 G/DL — LOW (ref 11.5–15.5)
MAGNESIUM SERPL-MCNC: 1.6 MG/DL — SIGNIFICANT CHANGE UP (ref 1.6–2.6)
MCHC RBC-ENTMCNC: 28.2 PG — SIGNIFICANT CHANGE UP (ref 27–34)
MCHC RBC-ENTMCNC: 29.5 GM/DL — LOW (ref 32–36)
MCV RBC AUTO: 95.5 FL — SIGNIFICANT CHANGE UP (ref 80–100)
NRBC # BLD: 0 /100 WBCS — SIGNIFICANT CHANGE UP
NRBC # FLD: 0 K/UL — SIGNIFICANT CHANGE UP
PHOSPHATE SERPL-MCNC: 3.9 MG/DL — SIGNIFICANT CHANGE UP (ref 2.5–4.5)
PLATELET # BLD AUTO: 390 K/UL — SIGNIFICANT CHANGE UP (ref 150–400)
POTASSIUM SERPL-MCNC: 3.5 MMOL/L — SIGNIFICANT CHANGE UP (ref 3.5–5.3)
POTASSIUM SERPL-SCNC: 3.5 MMOL/L — SIGNIFICANT CHANGE UP (ref 3.5–5.3)
RBC # BLD: 3.58 M/UL — LOW (ref 3.8–5.2)
RBC # FLD: 16.4 % — HIGH (ref 10.3–14.5)
SODIUM SERPL-SCNC: 138 MMOL/L — SIGNIFICANT CHANGE UP (ref 135–145)
WBC # BLD: 9.04 K/UL — SIGNIFICANT CHANGE UP (ref 3.8–10.5)
WBC # FLD AUTO: 9.04 K/UL — SIGNIFICANT CHANGE UP (ref 3.8–10.5)

## 2021-02-13 PROCEDURE — 99233 SBSQ HOSP IP/OBS HIGH 50: CPT

## 2021-02-13 RX ORDER — MAGNESIUM SULFATE 500 MG/ML
1 VIAL (ML) INJECTION ONCE
Refills: 0 | Status: COMPLETED | OUTPATIENT
Start: 2021-02-13 | End: 2021-02-13

## 2021-02-13 RX ORDER — PANTOPRAZOLE SODIUM 20 MG/1
40 TABLET, DELAYED RELEASE ORAL DAILY
Refills: 0 | Status: DISCONTINUED | OUTPATIENT
Start: 2021-02-13 | End: 2021-03-18

## 2021-02-13 RX ORDER — POTASSIUM CHLORIDE 20 MEQ
40 PACKET (EA) ORAL ONCE
Refills: 0 | Status: COMPLETED | OUTPATIENT
Start: 2021-02-13 | End: 2021-02-13

## 2021-02-13 RX ADMIN — AMLODIPINE BESYLATE 5 MILLIGRAM(S): 2.5 TABLET ORAL at 05:43

## 2021-02-13 RX ADMIN — Medication 1 TABLET(S): at 05:43

## 2021-02-13 RX ADMIN — ALBUTEROL 2 PUFF(S): 90 AEROSOL, METERED ORAL at 14:42

## 2021-02-13 RX ADMIN — ENOXAPARIN SODIUM 100 MILLIGRAM(S): 100 INJECTION SUBCUTANEOUS at 16:07

## 2021-02-13 RX ADMIN — ALBUTEROL 2 PUFF(S): 90 AEROSOL, METERED ORAL at 04:16

## 2021-02-13 RX ADMIN — CHOLESTYRAMINE 4 GRAM(S): 4 POWDER, FOR SUSPENSION ORAL at 11:35

## 2021-02-13 RX ADMIN — ALBUTEROL 2 PUFF(S): 90 AEROSOL, METERED ORAL at 08:35

## 2021-02-13 RX ADMIN — Medication 1 APPLICATION(S): at 16:07

## 2021-02-13 RX ADMIN — OXYCODONE HYDROCHLORIDE 5 MILLIGRAM(S): 5 TABLET ORAL at 16:06

## 2021-02-13 RX ADMIN — Medication 250 MILLIGRAM(S): at 05:43

## 2021-02-13 RX ADMIN — Medication 1 APPLICATION(S): at 05:43

## 2021-02-13 RX ADMIN — Medication 100 GRAM(S): at 11:00

## 2021-02-13 RX ADMIN — Medication 650 MILLIGRAM(S): at 13:15

## 2021-02-13 RX ADMIN — Medication 650 MILLIGRAM(S): at 05:42

## 2021-02-13 RX ADMIN — Medication 50 MILLIGRAM(S): at 16:06

## 2021-02-13 RX ADMIN — Medication 1 TABLET(S): at 13:14

## 2021-02-13 RX ADMIN — Medication 500 MILLIGRAM(S): at 11:39

## 2021-02-13 RX ADMIN — CHLORHEXIDINE GLUCONATE 1 APPLICATION(S): 213 SOLUTION TOPICAL at 13:14

## 2021-02-13 RX ADMIN — Medication 40 MILLIEQUIVALENT(S): at 11:39

## 2021-02-13 RX ADMIN — OXYCODONE HYDROCHLORIDE 5 MILLIGRAM(S): 5 TABLET ORAL at 10:31

## 2021-02-13 RX ADMIN — Medication 250 MILLIGRAM(S): at 11:38

## 2021-02-13 RX ADMIN — PANTOPRAZOLE SODIUM 40 MILLIGRAM(S): 20 TABLET, DELAYED RELEASE ORAL at 11:39

## 2021-02-13 RX ADMIN — ZINC SULFATE TAB 220 MG (50 MG ZINC EQUIVALENT) 220 MILLIGRAM(S): 220 (50 ZN) TAB at 11:38

## 2021-02-13 RX ADMIN — Medication 50 MILLIGRAM(S): at 05:42

## 2021-02-13 RX ADMIN — ENOXAPARIN SODIUM 100 MILLIGRAM(S): 100 INJECTION SUBCUTANEOUS at 05:44

## 2021-02-13 RX ADMIN — Medication 1 TABLET(S): at 16:08

## 2021-02-13 RX ADMIN — ALBUTEROL 2 PUFF(S): 90 AEROSOL, METERED ORAL at 22:22

## 2021-02-13 RX ADMIN — Medication 250 MILLIGRAM(S): at 16:07

## 2021-02-13 NOTE — PROGRESS NOTE ADULT - SUBJECTIVE AND OBJECTIVE BOX
CHIEF COMPLAINT: Patient is a 60y old  Female who presents with a chief complaint of Transfer from [facility] (12 Feb 2021 08:41)     SUBJECTIVE:     [ ] All other systems negative  [ ] Unable to assess ROS because ________    OBJECTIVE:  ICU Vital Signs Last 24 Hrs  T(C): 37.1 (13 Feb 2021 08:00), Max: 37.3 (12 Feb 2021 11:55)  T(F): 98.7 (13 Feb 2021 08:00), Max: 99.1 (12 Feb 2021 11:55)  HR: 81 (13 Feb 2021 08:24) (81 - 110)  BP: 138/57 (13 Feb 2021 08:00) (125/60 - 146/65)  BP(mean): 81 (13 Feb 2021 08:00) (81 - 81)  ABP: --  ABP(mean): --  RR: 20 (13 Feb 2021 08:00) (16 - 20)  SpO2: 99% (13 Feb 2021 08:24) (97% - 100%)    Mode: AC/ CMV (Assist Control/ Continuous Mandatory Ventilation), RR (machine): 12, TV (machine): 450, FiO2: 25, PEEP: 5, MAP: 12, PIP: 20    02-12 @ 07:01  -  02-13 @ 07:00  --------------------------------------------------------  IN: 1928 mL / OUT: 700 mL / NET: 1228 mL    CAPILLARY BLOOD GLUCOSE  POCT Blood Glucose.: 141 mg/dL (13 Feb 2021 06:46)    PHYSICAL EXAM:  General:   HEENT:   Lymph Nodes:  Neck:   Respiratory:   Cardiovascular:   Abdomen:   Extremities:   Skin:   Neurological:  Psychiatry:    HOSPITAL MEDICATIONS:  MEDICATIONS  (STANDING):  ALBUTerol    90 MICROgram(s) HFA Inhaler 2 Puff(s) Inhalation every 6 hours  amLODIPine   Tablet 5 milliGRAM(s) Oral daily  ascorbic acid 500 milliGRAM(s) Oral daily  chlorhexidine 4% Liquid 1 Application(s) Topical daily  cholestyramine Powder (Sugar-Free) 4 Gram(s) Oral daily  collagenase Ointment 1 Application(s) Topical two times a day  dextrose 40% Gel 15 Gram(s) Oral once  dextrose 5%. 1000 milliLiter(s) (50 mL/Hr) IV Continuous <Continuous>  dextrose 5%. 1000 milliLiter(s) (100 mL/Hr) IV Continuous <Continuous>  dextrose 50% Injectable 25 Gram(s) IV Push once  dextrose 50% Injectable 12.5 Gram(s) IV Push once  dextrose 50% Injectable 25 Gram(s) IV Push once  enoxaparin Injectable 100 milliGRAM(s) SubCutaneous every 12 hours  glucagon  Injectable 1 milliGRAM(s) IntraMuscular once  insulin lispro (ADMELOG) corrective regimen sliding scale   SubCutaneous every 6 hours  lactobacillus acidophilus 1 Tablet(s) Oral two times a day  magnesium sulfate  IVPB 1 Gram(s) IV Intermittent once  metoprolol tartrate 50 milliGRAM(s) Oral two times a day  multivitamin 1 Tablet(s) Oral daily  pantoprazole   Suspension 40 milliGRAM(s) Oral daily  potassium chloride   Powder 40 milliEquivalent(s) Oral once  vancomycin    Solution 250 milliGRAM(s) Oral every 6 hours  zinc sulfate 220 milliGRAM(s) Oral daily    MEDICATIONS  (PRN):  acetaminophen    Suspension .. 650 milliGRAM(s) Oral every 6 hours PRN Temp greater or equal to 38C (100.4F), Mild Pain (1 - 3), Moderate Pain (4 - 6)  artificial tears (preservative free) Ophthalmic Solution 1 Drop(s) Both EYES every 8 hours PRN Dry Eyes  oxyCODONE    Solution 5 milliGRAM(s) Oral every 4 hours PRN Severe Pain (7 - 10)    LABS:                        10.1   9.04  )-----------( 390      ( 13 Feb 2021 07:41 )             34.2     02-13    138  |  104  |  10  ----------------------------<  140<H>  3.5   |  22  |  0.24<L>    Ca    9.5      13 Feb 2021 07:41  Phos  3.9     02-13  Mg     1.6     02-13             CHIEF COMPLAINT: Patient is a 60y old  Female who presents with a chief complaint of Transfer from [facility] (12 Feb 2021 08:41)     SUBJECTIVE: Seen by bedside during AM rounds with little to no complaints. Denies pain, but nails x 20 (toes and fingers) are long and bothering her. ROS limited second to vent.     OBJECTIVE:  ICU Vital Signs Last 24 Hrs  T(C): 37.1 (13 Feb 2021 08:00), Max: 37.3 (12 Feb 2021 11:55)  T(F): 98.7 (13 Feb 2021 08:00), Max: 99.1 (12 Feb 2021 11:55)  HR: 81 (13 Feb 2021 08:24) (81 - 110)  BP: 138/57 (13 Feb 2021 08:00) (125/60 - 146/65)  BP(mean): 81 (13 Feb 2021 08:00) (81 - 81)  ABP: --  ABP(mean): --  RR: 20 (13 Feb 2021 08:00) (16 - 20)  SpO2: 99% (13 Feb 2021 08:24) (97% - 100%)    Mode: AC/ CMV (Assist Control/ Continuous Mandatory Ventilation), RR (machine): 12, TV (machine): 450, FiO2: 25, PEEP: 5, MAP: 12, PIP: 20    02-12 @ 07:01  -  02-13 @ 07:00  --------------------------------------------------------  IN: 1928 mL / OUT: 700 mL / NET: 1228 mL    CAPILLARY BLOOD GLUCOSE  POCT Blood Glucose.: 141 mg/dL (13 Feb 2021 06:46)    PHYSICAL EXAM:  General: NAD, well groomed. (+) Obese.   HEENT: NC/ AT, PERRLA, Tracheostomy clean, dry and intact.   Cardio: RRR, S1/S2, no murmurs or rubs.   Pulm: Coarse vent sounds noted throughout, equal bilaterally.   GI: Soft, NDNT, BS (+). PEG (+) Rectal Tube with copious diarrhea (+)   MS: No pedal edema. AROMI.   Neuro: Awake and alert. Follow commands. No focal neurological deficits noted.   Skin: Warm and dry. No jaundice or cyanosis     HOSPITAL MEDICATIONS:  MEDICATIONS  (STANDING):  ALBUTerol    90 MICROgram(s) HFA Inhaler 2 Puff(s) Inhalation every 6 hours  amLODIPine   Tablet 5 milliGRAM(s) Oral daily  ascorbic acid 500 milliGRAM(s) Oral daily  chlorhexidine 4% Liquid 1 Application(s) Topical daily  cholestyramine Powder (Sugar-Free) 4 Gram(s) Oral daily  collagenase Ointment 1 Application(s) Topical two times a day  dextrose 40% Gel 15 Gram(s) Oral once  dextrose 5%. 1000 milliLiter(s) (50 mL/Hr) IV Continuous <Continuous>  dextrose 5%. 1000 milliLiter(s) (100 mL/Hr) IV Continuous <Continuous>  dextrose 50% Injectable 25 Gram(s) IV Push once  dextrose 50% Injectable 12.5 Gram(s) IV Push once  dextrose 50% Injectable 25 Gram(s) IV Push once  enoxaparin Injectable 100 milliGRAM(s) SubCutaneous every 12 hours  glucagon  Injectable 1 milliGRAM(s) IntraMuscular once  insulin lispro (ADMELOG) corrective regimen sliding scale   SubCutaneous every 6 hours  lactobacillus acidophilus 1 Tablet(s) Oral two times a day  magnesium sulfate  IVPB 1 Gram(s) IV Intermittent once  metoprolol tartrate 50 milliGRAM(s) Oral two times a day  multivitamin 1 Tablet(s) Oral daily  pantoprazole   Suspension 40 milliGRAM(s) Oral daily  potassium chloride   Powder 40 milliEquivalent(s) Oral once  vancomycin    Solution 250 milliGRAM(s) Oral every 6 hours  zinc sulfate 220 milliGRAM(s) Oral daily    MEDICATIONS  (PRN):  acetaminophen    Suspension .. 650 milliGRAM(s) Oral every 6 hours PRN Temp greater or equal to 38C (100.4F), Mild Pain (1 - 3), Moderate Pain (4 - 6)  artificial tears (preservative free) Ophthalmic Solution 1 Drop(s) Both EYES every 8 hours PRN Dry Eyes  oxyCODONE    Solution 5 milliGRAM(s) Oral every 4 hours PRN Severe Pain (7 - 10)    LABS:                        10.1   9.04  )-----------( 390      ( 13 Feb 2021 07:41 )             34.2     02-13    138  |  104  |  10  ----------------------------<  140<H>  3.5   |  22  |  0.24<L>    Ca    9.5      13 Feb 2021 07:41  Phos  3.9     02-13  Mg     1.6     02-13

## 2021-02-13 NOTE — PROGRESS NOTE ADULT - ASSESSMENT
61 YO F with PMHx of HTN, DVT on Xarelto, Peritonitis s/p Oral's Procedure and Ileostomy (reversed in 2011) and AARON who presented to [facility] on 11/18 for AHRF second to COVID 19, intubated on 11/23 complicated by R lung abscesses on CT CHEST 12/5, multibacterial PNA and bacteremia, ARTEMIO s/p CRRT and HD, and s/p Tracheostomy 12/18 c/b track leak and s/p OR 8Bivona. Transferred to [facility] for furhter management.     # AMS   - AOX3 at baseline.   - CTH and EEG WNL   - Now weaned off all sedation.   - Monitor mentation and supportive care     # ARDS second to COVID vs Multi-bacterial PNA/ R Lung Abscess  - Intubated 11/23 and course complicated with multi-bacterial PNA and lung abscesses.   - Lung abscess presented on CT CHEST 12/5  - s/p Tracheostomy on 12/18 by CTSx. Sutures out 1/1/2021  - Air leak noted and s/p Trach Exchange for Bivona with CTSx 12/31  - Currently on FULL Vent. PS 5/5/30 as tolerated.   - Proventil, IPV and Chest PT Q6H and Suction PRN. Trach Care QD.     # Vasoplegic vs Septic Shock   - Weaned off pressors. Monitor BP on Norvasc and Metoprolol.   - On full AC for Hx of DVT. CTA CHEST with no PEs.     # ARTEMIO s/p CRRT and HD   - ARTEMIO now resolved and no longer required HD.   - Hypokalemia in setting of diarrhea/ CDIFF. Monitor electrolytes.   - Monitor renal function and avoid nephrotoxins.     # GI   - Initially CTSx and GI deferred PEG given Ventral Hernia   - s/p PEG placement with IR 1/11   - Continue on TF and monitor tolerance.   - Monitor diarrhea as CDIFF (+). Hold laxatives and continue on PO Vancomycin as below     # Sepsis second to COVID vs Multi-bacterial PNA/ Bacteremia and R Lung Abscess vs CDIFF (+)   - COVID with superimposed multi-bacterial VAP vs aspiration PNA vs cavitary PNA.   - SCx (11/24) MSSA and BCx (11/27) with MSSA and Proteus Bacteremia  - SCx (12/1, 12/12 and 12/27) with Stenotrophomonas.   - SCx (1/9) with  Pseudomonas and Stenotrophomonas   - BCX has been persistently negative from 12/1, and most recently 1/12   - CT CHEST 12/4 with right lung cavitation.   - s/p multiple antibiotics, but now completed Fortaz and Flagyl (1/12-1/16)  - RPT CT CHEST 1/28 with improvement of right lung abscess   - SCx with  Pseudomonas and Stenotrophomonas and s/p TED/ Fortaz (1/28-2/6)    # CDIFF (+)   - Started on PO Vancomycin (2/1 - 2/10)  - Dose increased/ extending to 14 days until 2/14 second to continued diarrhea.   - Questran daily to help with bulking stool   - ID following     # DM2   - Continue on ISS and NPH and monitor FS.   - Adjust insulin as needed.     # Hx of DVT   - Continue on Lovenox FULL DOSE for Xarelto for now.     # Skin/ Facial Wounds   - WOC recommendations appreciated   - Plastics evaluation and recommendations of facial wound appreciated     # DVT/ GI PPX with FULL AC/ PPI   # CODE STATUS - FULL CODE   # DISPO - PT recc Rehab/ Vent Facility  61 YO F with PMHx of HTN, DVT on Xarelto, Peritonitis s/p Oral's Procedure and Ileostomy (reversed in 2011) and AARON who presented to [facility] on 11/18 for AHRF second to COVID 19, intubated on 11/23 complicated by R lung abscesses on CT CHEST 12/5, multibacterial PNA and bacteremia, ARTEMIO s/p CRRT and HD, and s/p Tracheostomy 12/18 c/b track leak and s/p OR 8Bivona. Transferred to [facility] for furhter management.     # AMS   - AOX3 at baseline.   - CTH and EEG WNL   - Now weaned off all sedation.   - Monitor mentation and supportive care     # ARDS second to COVID vs Multi-bacterial PNA/ R Lung Abscess  - Intubated 11/23 and course complicated with multi-bacterial PNA and lung abscesses.   - Lung abscess presented on CT CHEST 12/5  - s/p Tracheostomy on 12/18 by CTSx. Sutures out 1/1/2021  - Air leak noted and s/p Trach Exchange for Bivona with CTSx 12/31  - Currently on FULL Vent. PS 5/5/30 as tolerated.   - Proventil, IPV and Chest PT Q6H and Suction PRN. Trach Care QD.     # Vasoplegic vs Septic Shock   - Weaned off pressors. Monitor BP on Norvasc and Metoprolol.   - On full AC for Hx of DVT. CTA CHEST with no PEs.     # ARTEMIO s/p CRRT and HD   - ARTEMIO now resolved and no longer required HD.   - Hypokalemia in setting of diarrhea/ CDIFF. Monitor electrolytes.   - Monitor renal function and avoid nephrotoxins.     # GI   - Initially CTSx and GI deferred PEG given Ventral Hernia   - s/p PEG placement with IR 1/11   - Continue on TF and monitor tolerance.   - Monitor diarrhea as CDIFF (+). Hold laxatives and continue on PO Vancomycin as below     # Sepsis second to COVID vs Multi-bacterial PNA/ Bacteremia and R Lung Abscess vs CDIFF (+)   - COVID with superimposed multi-bacterial VAP vs aspiration PNA vs cavitary PNA.   - SCx (11/24) MSSA and BCx (11/27) with MSSA and Proteus Bacteremia  - SCx (12/1, 12/12 and 12/27) with Stenotrophomonas.   - SCx (1/9) with  Pseudomonas and Stenotrophomonas   - BCX has been persistently negative from 12/1, and most recently 1/12   - CT CHEST 12/4 with right lung cavitation.   - s/p multiple antibiotics, but now completed Fortaz and Flagyl (1/12-1/16)  - RPT CT CHEST 1/28 with improvement of right lung abscess   - SCx with  Pseudomonas and Stenotrophomonas and s/p TED/ Fortaz (1/28-2/6)    # CDIFF (+)   - Started on PO Vancomycin (2/1 - 2/10)  - Dose increased/ extending to 14 days until 2/14 second to continued diarrhea.   - Questran daily to help with bulking stool   - ID following     # DM2   - Continue on ISS and NPH and monitor FS.   - Adjust insulin as needed.     # Hx of DVT   - Continue on Lovenox FULL DOSE for Xarelto for now.     # Skin/ Facial Wounds   - WOC recommendations appreciated   - Plastics evaluation and recommendations of facial wound appreciated     # Contracted Wrists   - PT working on strength. OT consulted.     # DVT/ GI PPX with FULL AC/ PPI   # CODE STATUS - FULL CODE   # DISPO - PT recc Rehab/ Vent Facility

## 2021-02-13 NOTE — CONSULT NOTE ADULT - SUBJECTIVE AND OBJECTIVE BOX
Patient is a 60y old  Female who presents with a chief complaint of Transfer from [facility] (2021 08:58)      HPI:  Patient is a 59yo F w/ AARON, peritonitis s/p Oral's procedure and ileostomy (reversed ), HTN, prior DVT/PE, ?Asthma admitted on 2020 to [facility] after presenting for  productive cough  w/ SOB x9 days and diarrhea x7 days and fever x1 day. Patient was found to be COVID + on 2020 and completed remdesivir -. Patient was intubated for airway protection on 2020. Patient has required proning (last 2020). Hospital course c/b by ATN, requiring CVVHD now on intermittent HD, unresponsive to bumex challenge last HD . Course also c/b Stenotrophomonas (, completed Levaquin x7 days), MSSA/P. mirablis bacteremia (on Cefazolin, potentially due to urine vs line-associated and MSSA in sputum due to PNA), also with lung abscesses on CT chest on .  Patient currently on Volume AC 30/350/8/35% on Precedex. Patient intermittently opens eyes but is not yet responsive. Transferred over to [facility] on 12/10 to offload [facility] COVID ICU    (10 Dec 2020 14:13)      PAST MEDICAL & SURGICAL HISTORY:  Pneumomediastinum    Umbilical hernia  Reduciable    Osteoarthritis of both knees, unspecified osteoarthritis type    AARON (Obstructive Sleep Apnea)  category II pt uses c/pap pt to bering to hospital/  OR booking notified    Pneumonia      GERD (Gastroesophageal Reflux Disease)    Asthma  diagnosed   on adbvair denies attacks    DVT (Deep Venous Thrombosis)  left leg 6 m s/p knee replacement in     HTN (Hypertension)    H/O ileostomy  Ileostomy Revesal     S/P LEEP      S/P Exploratory Laparotomy    peritonitis  s/p right knee replacement/ colon resection and ileostomy    S/P Colonoscopy      S/P Endoscopy   gerd    S/P  Section      History of Tubal Ligation  s/p c/section     S/P Knee Surgery  2010        MEDICATIONS  (STANDING):  ALBUTerol    90 MICROgram(s) HFA Inhaler 2 Puff(s) Inhalation every 6 hours  amLODIPine   Tablet 5 milliGRAM(s) Oral daily  ascorbic acid 500 milliGRAM(s) Oral daily  chlorhexidine 4% Liquid 1 Application(s) Topical daily  cholestyramine Powder (Sugar-Free) 4 Gram(s) Oral daily  collagenase Ointment 1 Application(s) Topical two times a day  dextrose 40% Gel 15 Gram(s) Oral once  dextrose 5%. 1000 milliLiter(s) (50 mL/Hr) IV Continuous <Continuous>  dextrose 5%. 1000 milliLiter(s) (100 mL/Hr) IV Continuous <Continuous>  dextrose 50% Injectable 25 Gram(s) IV Push once  dextrose 50% Injectable 12.5 Gram(s) IV Push once  dextrose 50% Injectable 25 Gram(s) IV Push once  enoxaparin Injectable 100 milliGRAM(s) SubCutaneous every 12 hours  glucagon  Injectable 1 milliGRAM(s) IntraMuscular once  insulin lispro (ADMELOG) corrective regimen sliding scale   SubCutaneous every 6 hours  lactobacillus acidophilus 1 Tablet(s) Oral two times a day  magnesium sulfate  IVPB 1 Gram(s) IV Intermittent once  metoprolol tartrate 50 milliGRAM(s) Oral two times a day  multivitamin 1 Tablet(s) Oral daily  pantoprazole   Suspension 40 milliGRAM(s) Oral daily  potassium chloride   Powder 40 milliEquivalent(s) Oral once  vancomycin    Solution 250 milliGRAM(s) Oral every 6 hours  zinc sulfate 220 milliGRAM(s) Oral daily    MEDICATIONS  (PRN):  acetaminophen    Suspension .. 650 milliGRAM(s) Oral every 6 hours PRN Temp greater or equal to 38C (100.4F), Mild Pain (1 - 3), Moderate Pain (4 - 6)  artificial tears (preservative free) Ophthalmic Solution 1 Drop(s) Both EYES every 8 hours PRN Dry Eyes  oxyCODONE    Solution 5 milliGRAM(s) Oral every 4 hours PRN Severe Pain (7 - 10)      Allergies    Kiwi (Unknown)  latex (Anaphylaxis)  latex (Unknown)  penicillin (Other)  penicillin (Unknown)  perfume  hives (Other)  potassium acetate (Other)  soap additives hives (Other)    Intolerances        VITALS:    Vital Signs Last 24 Hrs  T(C): 37.1 (2021 08:00), Max: 37.3 (2021 11:55)  T(F): 98.7 (2021 08:00), Max: 99.1 (2021 11:55)  HR: 81 (2021 08:24) (81 - 110)  BP: 138/57 (2021 08:00) (125/60 - 146/65)  BP(mean): 81 (2021 08:00) (81 - 81)  RR: 20 (2021 08:00) (16 - 20)  SpO2: 99% (2021 08:24) (97% - 100%)    LABS:                          10.1   9.04  )-----------( 390      ( 2021 07:41 )             34.2       02-13    138  |  104  |  10  ----------------------------<  140<H>  3.5   |  22  |  0.24<L>    Ca    9.5      2021 07:41  Phos  3.9     02-13  Mg     1.6     02-13        CAPILLARY BLOOD GLUCOSE      POCT Blood Glucose.: 141 mg/dL (2021 06:46)  POCT Blood Glucose.: 131 mg/dL (2021 23:49)  POCT Blood Glucose.: 125 mg/dL (2021 17:13)  POCT Blood Glucose.: 161 mg/dL (2021 11:20)          LOWER EXTREMITY PHYSICAL EXAM:    Vascular: DP/PT 2/4, B/L, CFT <3 seconds B/L, Temperature gradient WNL, B/L.   Neuro: Epicritic sensation intact to the level of digits, B/L.  Musculoskeletal/Ortho: no contractures  Skin: mild skin slough, elongated nails x10 slight curvature no paronychia noted x10, otherwise no open lesion no acute signs of infection      RADIOLOGY & ADDITIONAL STUDIES:

## 2021-02-14 LAB
GLUCOSE BLDC GLUCOMTR-MCNC: 114 MG/DL — HIGH (ref 70–99)
GLUCOSE BLDC GLUCOMTR-MCNC: 117 MG/DL — HIGH (ref 70–99)
GLUCOSE BLDC GLUCOMTR-MCNC: 133 MG/DL — HIGH (ref 70–99)
GLUCOSE BLDC GLUCOMTR-MCNC: 142 MG/DL — HIGH (ref 70–99)

## 2021-02-14 PROCEDURE — 99233 SBSQ HOSP IP/OBS HIGH 50: CPT

## 2021-02-14 RX ADMIN — Medication 1 DROP(S): at 18:17

## 2021-02-14 RX ADMIN — Medication 50 MILLIGRAM(S): at 18:18

## 2021-02-14 RX ADMIN — ALBUTEROL 2 PUFF(S): 90 AEROSOL, METERED ORAL at 15:43

## 2021-02-14 RX ADMIN — ZINC SULFATE TAB 220 MG (50 MG ZINC EQUIVALENT) 220 MILLIGRAM(S): 220 (50 ZN) TAB at 13:02

## 2021-02-14 RX ADMIN — AMLODIPINE BESYLATE 5 MILLIGRAM(S): 2.5 TABLET ORAL at 05:44

## 2021-02-14 RX ADMIN — CHOLESTYRAMINE 4 GRAM(S): 4 POWDER, FOR SUSPENSION ORAL at 11:30

## 2021-02-14 RX ADMIN — Medication 500 MILLIGRAM(S): at 13:01

## 2021-02-14 RX ADMIN — Medication 250 MILLIGRAM(S): at 18:16

## 2021-02-14 RX ADMIN — OXYCODONE HYDROCHLORIDE 5 MILLIGRAM(S): 5 TABLET ORAL at 07:36

## 2021-02-14 RX ADMIN — Medication 250 MILLIGRAM(S): at 00:41

## 2021-02-14 RX ADMIN — PANTOPRAZOLE SODIUM 40 MILLIGRAM(S): 20 TABLET, DELAYED RELEASE ORAL at 13:01

## 2021-02-14 RX ADMIN — ALBUTEROL 2 PUFF(S): 90 AEROSOL, METERED ORAL at 04:39

## 2021-02-14 RX ADMIN — ALBUTEROL 2 PUFF(S): 90 AEROSOL, METERED ORAL at 11:29

## 2021-02-14 RX ADMIN — ENOXAPARIN SODIUM 100 MILLIGRAM(S): 100 INJECTION SUBCUTANEOUS at 18:17

## 2021-02-14 RX ADMIN — Medication 1 TABLET(S): at 13:01

## 2021-02-14 RX ADMIN — Medication 1 APPLICATION(S): at 18:16

## 2021-02-14 RX ADMIN — Medication 50 MILLIGRAM(S): at 05:45

## 2021-02-14 RX ADMIN — Medication 250 MILLIGRAM(S): at 05:45

## 2021-02-14 RX ADMIN — Medication 1 TABLET(S): at 18:16

## 2021-02-14 RX ADMIN — ENOXAPARIN SODIUM 100 MILLIGRAM(S): 100 INJECTION SUBCUTANEOUS at 05:45

## 2021-02-14 RX ADMIN — Medication 1 TABLET(S): at 05:45

## 2021-02-14 RX ADMIN — Medication 650 MILLIGRAM(S): at 18:17

## 2021-02-14 RX ADMIN — Medication 250 MILLIGRAM(S): at 13:01

## 2021-02-14 RX ADMIN — CHLORHEXIDINE GLUCONATE 1 APPLICATION(S): 213 SOLUTION TOPICAL at 15:02

## 2021-02-14 RX ADMIN — Medication 1 APPLICATION(S): at 05:44

## 2021-02-14 RX ADMIN — OXYCODONE HYDROCHLORIDE 5 MILLIGRAM(S): 5 TABLET ORAL at 15:02

## 2021-02-14 RX ADMIN — ALBUTEROL 2 PUFF(S): 90 AEROSOL, METERED ORAL at 21:57

## 2021-02-14 NOTE — PROGRESS NOTE ADULT - ASSESSMENT
59 YO F with PMHx of HTN, DVT on Xarelto, Peritonitis s/p Oral's Procedure and Ileostomy (reversed in 2011) and AARON who presented to [facility] on 11/18 for AHRF second to COVID 19, intubated on 11/23 complicated by R lung abscesses on CT CHEST 12/5, multibacterial PNA and bacteremia, ARTEMIO s/p CRRT and HD, and s/p Tracheostomy 12/18 c/b track leak and s/p OR 8Bivona. Transferred to [facility] for furhter management.     # AMS   - AOX3 at baseline.   - CTH and EEG WNL   - Now weaned off all sedation.   - Monitor mentation and supportive care     # ARDS second to COVID vs Multi-bacterial PNA/ R Lung Abscess  - Intubated 11/23 and course complicated with multi-bacterial PNA and lung abscesses.   - Lung abscess presented on CT CHEST 12/5  - s/p Tracheostomy on 12/18 by CTSx. Sutures out 1/1/2021  - Air leak noted and s/p Trach Exchange for Bivona with CTSx 12/31  - Currently on FULL Vent. PS 5/5/30 as tolerated.   - Proventil, IPV and Chest PT Q6H and Suction PRN. Trach Care QD.     # Vasoplegic vs Septic Shock   - Weaned off pressors. Monitor BP on Norvasc and Metoprolol.   - On full AC for Hx of DVT. CTA CHEST with no PEs.     # ARTEMIO s/p CRRT and HD   - ARTEMIO now resolved and no longer required HD.   - Hypokalemia in setting of diarrhea/ CDIFF. Monitor electrolytes.   - Monitor renal function and avoid nephrotoxins.     # GI   - Initially CTSx and GI deferred PEG given Ventral Hernia   - s/p PEG placement with IR 1/11   - Continue on TF and monitor tolerance.   - Monitor diarrhea as CDIFF (+). Hold laxatives and continue on PO Vancomycin as below     # Sepsis second to COVID vs Multi-bacterial PNA/ Bacteremia and R Lung Abscess vs CDIFF (+)   - COVID with superimposed multi-bacterial VAP vs aspiration PNA vs cavitary PNA.   - SCx (11/24) MSSA and BCx (11/27) with MSSA and Proteus Bacteremia  - SCx (12/1, 12/12 and 12/27) with Stenotrophomonas.   - SCx (1/9) with  Pseudomonas and Stenotrophomonas   - BCX has been persistently negative from 12/1, and most recently 1/12   - CT CHEST 12/4 with right lung cavitation.   - s/p multiple antibiotics, but now completed Fortaz and Flagyl (1/12-1/16)  - RPT CT CHEST 1/28 with improvement of right lung abscess   - SCx with  Pseudomonas and Stenotrophomonas and s/p TED/ Fortaz (1/28-2/6)    # CDIFF (+)   - Started on PO Vancomycin (2/1 - 2/10)  - Dose increased/ extending to 14 days until 2/14 second to continued diarrhea.   - Questran daily to help with bulking stool   - ID following     # DM2   - Continue on ISS and NPH and monitor FS.   - Adjust insulin as needed.     # Hx of DVT   - Continue on Lovenox FULL DOSE for Xarelto for now.     # Skin/ Facial Wounds   - WOC recommendations appreciated   - Plastics evaluation and recommendations of facial wound appreciated     # Contracted Wrists   - PT working on strength. OT consulted.     # DVT/ GI PPX with FULL AC/ PPI   # CODE STATUS - FULL CODE   # DISPO - PT recc Rehab/ Vent Facility  61 YO F with PMHx of HTN, DVT on Xarelto, Peritonitis s/p Oral's Procedure and Ileostomy (reversed in 2011) and AARON who presented to [facility] on 11/18 for AHRF second to COVID 19, intubated on 11/23 complicated by R lung abscesses on CT CHEST 12/5, multibacterial PNA and bacteremia, ARTEMIO s/p CRRT and HD, and s/p Tracheostomy 12/18 c/b track leak and s/p OR 8Bivona. Transferred to [facility] for furhter management.     # AMS   - AOX3 at baseline.   - CTH and EEG WNL   - Now weaned off all sedation.   - Monitor mentation and supportive care     # ARDS second to COVID vs Multi-bacterial PNA/ R Lung Abscess  - Intubated 11/23 and course complicated with multi-bacterial PNA and lung abscesses.   - Lung abscess presented on CT CHEST 12/5  - s/p Tracheostomy on 12/18 by CTSx. Sutures out 1/1/2021  - Air leak noted and s/p Trach Exchange for Bivona with CTSx 12/31  - Currently on FULL Vent QHS, tolerates PS well and will attempt TC today.   - Proventil, IPV and Chest PT Q6H and Suction PRN. Trach Care QD.     # Vasoplegic vs Septic Shock   - Weaned off pressors. Monitor BP on Norvasc and Metoprolol.   - On full AC for Hx of DVT. CTA CHEST with no PEs.     # ARTEMIO s/p CRRT and HD   - ARTEMIO now resolved and no longer required HD.   - Hypokalemia in setting of diarrhea/ CDIFF. Monitor electrolytes.   - Monitor renal function and avoid nephrotoxins.     # GI   - Initially CTSx and GI deferred PEG given Ventral Hernia   - s/p PEG placement with IR 1/11   - Continue on TF and monitor tolerance.   - Monitor diarrhea as CDIFF (+). Hold laxatives and continue on PO Vancomycin as below     # Sepsis second to COVID vs Multi-bacterial PNA/ Bacteremia and R Lung Abscess vs CDIFF (+)   - COVID with superimposed multi-bacterial VAP vs aspiration PNA vs cavitary PNA.   - SCx (11/24) MSSA and BCx (11/27) with MSSA and Proteus Bacteremia  - SCx (12/1, 12/12 and 12/27) with Stenotrophomonas.   - SCx (1/9) with  Pseudomonas and Stenotrophomonas   - BCX has been persistently negative from 12/1, and most recently 1/12   - CT CHEST 12/4 with right lung cavitation.   - s/p multiple antibiotics, but now completed Fortaz and Flagyl (1/12-1/16)  - RPT CT CHEST 1/28 with improvement of right lung abscess   - SCx with  Pseudomonas and Stenotrophomonas and s/p TED/ Fortaz (1/28-2/6)    # CDIFF (+)   - Started on PO Vancomycin (2/1 - 2/10)  - Dose increased/ extending to 14 days until 2/14 second to continued diarrhea.   - Questran daily to help with bulking stool   - ID following     # DM2   - Continue on ISS and NPH and monitor FS.   - Adjust insulin as needed.     # Hx of DVT   - Continue on Lovenox FULL DOSE for Xarelto for now.     # Skin/ Facial Wounds   - WOC recommendations appreciated   - Plastics evaluation and recommendations of facial wound appreciated     # Contracted Wrists   - PT working on strength. OT consulted.     # DVT/ GI PPX with FULL AC/ PPI   # CODE STATUS - FULL CODE   # DISPO - PT recc Rehab/ Vent Facility

## 2021-02-14 NOTE — PROGRESS NOTE ADULT - SUBJECTIVE AND OBJECTIVE BOX
CHIEF COMPLAINT: Patient is a 60y old  Female who presents with a chief complaint of Transfer from [facility] (13 Feb 2021 09:44)    SUBJECTIVE: No interval events overnight.     [ ] All other systems negative  [ ] Unable to assess ROS because ________    OBJECTIVE:  ICU Vital Signs Last 24 Hrs  T(C): 37.1 (14 Feb 2021 08:00), Max: 37.1 (14 Feb 2021 00:37)  T(F): 98.7 (14 Feb 2021 08:00), Max: 98.8 (14 Feb 2021 00:37)  HR: 84 (14 Feb 2021 08:00) (77 - 97)  BP: 154/59 (14 Feb 2021 08:00) (126/72 - 154/59)  BP(mean): 85 (14 Feb 2021 08:00) (77 - 87)  ABP: --  ABP(mean): --  RR: 14 (14 Feb 2021 08:00) (12 - 20)  SpO2: 97% (14 Feb 2021 08:00) (96% - 100%)    Mode: AC/ CMV (Assist Control/ Continuous Mandatory Ventilation), RR (machine): 12, TV (machine): 450, FiO2: 25, PEEP: 5, PS: 5, MAP: 11, PIP: 34    02-13 @ 07:01  -  02-14 @ 07:00  --------------------------------------------------------  IN: 2224 mL / OUT: 1770 mL / NET: 454 mL    CAPILLARY BLOOD GLUCOSE  POCT Blood Glucose.: 133 mg/dL (14 Feb 2021 06:18)    PHYSICAL EXAM:  General: NAD, well groomed. (+) Obese.   HEENT: NC/ AT, PERRLA, Tracheostomy clean, dry and intact.   Cardio: RRR, S1/S2, no murmurs or rubs.   Pulm: Coarse vent sounds noted throughout, equal bilaterally.   GI: Soft, NDNT, BS (+). PEG (+) Rectal Tube with copious diarrhea (+)   MS: No pedal edema. AROMI.   Neuro: Awake and alert. Follow commands. No focal neurological deficits noted.   Skin: Warm and dry. No jaundice or cyanosis     HOSPITAL MEDICATIONS:  MEDICATIONS  (STANDING):  ALBUTerol    90 MICROgram(s) HFA Inhaler 2 Puff(s) Inhalation every 6 hours  amLODIPine   Tablet 5 milliGRAM(s) Oral daily  ascorbic acid 500 milliGRAM(s) Oral daily  chlorhexidine 4% Liquid 1 Application(s) Topical daily  cholestyramine Powder (Sugar-Free) 4 Gram(s) Oral daily  collagenase Ointment 1 Application(s) Topical two times a day  dextrose 40% Gel 15 Gram(s) Oral once  dextrose 5%. 1000 milliLiter(s) (50 mL/Hr) IV Continuous <Continuous>  dextrose 5%. 1000 milliLiter(s) (100 mL/Hr) IV Continuous <Continuous>  dextrose 50% Injectable 25 Gram(s) IV Push once  dextrose 50% Injectable 12.5 Gram(s) IV Push once  dextrose 50% Injectable 25 Gram(s) IV Push once  enoxaparin Injectable 100 milliGRAM(s) SubCutaneous every 12 hours  glucagon  Injectable 1 milliGRAM(s) IntraMuscular once  insulin lispro (ADMELOG) corrective regimen sliding scale   SubCutaneous every 6 hours  lactobacillus acidophilus 1 Tablet(s) Oral two times a day  metoprolol tartrate 50 milliGRAM(s) Oral two times a day  multivitamin 1 Tablet(s) Oral daily  pantoprazole  Injectable 40 milliGRAM(s) IV Push daily  vancomycin    Solution 250 milliGRAM(s) Oral every 6 hours  zinc sulfate 220 milliGRAM(s) Oral daily    MEDICATIONS  (PRN):  acetaminophen    Suspension .. 650 milliGRAM(s) Oral every 6 hours PRN Temp greater or equal to 38C (100.4F), Mild Pain (1 - 3), Moderate Pain (4 - 6)  artificial tears (preservative free) Ophthalmic Solution 1 Drop(s) Both EYES every 8 hours PRN Dry Eyes  oxyCODONE    Solution 5 milliGRAM(s) Oral every 4 hours PRN Severe Pain (7 - 10)    LABS:                        10.1   9.04  )-----------( 390      ( 13 Feb 2021 07:41 )             34.2     02-13    138  |  104  |  10  ----------------------------<  140<H>  3.5   |  22  |  0.24<L>    Ca    9.5      13 Feb 2021 07:41  Phos  3.9     02-13  Mg     1.6     02-13       CHIEF COMPLAINT: Patient is a 60y old  Female who presents with a chief complaint of Transfer from [facility] (13 Feb 2021 09:44)    SUBJECTIVE: No interval events overnight. Seen by bedside and speaking over vent second to trach leak. PS well. Will attempt TC today. ROS limited second to vent, but denies SOB .     OBJECTIVE:  ICU Vital Signs Last 24 Hrs  T(C): 37.1 (14 Feb 2021 08:00), Max: 37.1 (14 Feb 2021 00:37)  T(F): 98.7 (14 Feb 2021 08:00), Max: 98.8 (14 Feb 2021 00:37)  HR: 84 (14 Feb 2021 08:00) (77 - 97)  BP: 154/59 (14 Feb 2021 08:00) (126/72 - 154/59)  BP(mean): 85 (14 Feb 2021 08:00) (77 - 87)  ABP: --  ABP(mean): --  RR: 14 (14 Feb 2021 08:00) (12 - 20)  SpO2: 97% (14 Feb 2021 08:00) (96% - 100%)    Mode: AC/ CMV (Assist Control/ Continuous Mandatory Ventilation), RR (machine): 12, TV (machine): 450, FiO2: 25, PEEP: 5, PS: 5, MAP: 11, PIP: 34    02-13 @ 07:01  -  02-14 @ 07:00  --------------------------------------------------------  IN: 2224 mL / OUT: 1770 mL / NET: 454 mL    CAPILLARY BLOOD GLUCOSE  POCT Blood Glucose.: 133 mg/dL (14 Feb 2021 06:18)    PHYSICAL EXAM:  General: NAD, well groomed. (+) Obese.   HEENT: NC/ AT, PERRLA, Tracheostomy clean, dry and intact.   Cardio: RRR, S1/S2, no murmurs or rubs.   Pulm: Coarse vent sounds noted throughout, equal bilaterally.   GI: Soft, NDNT, BS (+). PEG (+) Rectal Tube with copious diarrhea (+)   MS: No pedal edema. AROMI.   Neuro: Awake and alert. Follow commands. No focal neurological deficits noted.   Skin: Warm and dry. No jaundice or cyanosis     HOSPITAL MEDICATIONS:  MEDICATIONS  (STANDING):  ALBUTerol    90 MICROgram(s) HFA Inhaler 2 Puff(s) Inhalation every 6 hours  amLODIPine   Tablet 5 milliGRAM(s) Oral daily  ascorbic acid 500 milliGRAM(s) Oral daily  chlorhexidine 4% Liquid 1 Application(s) Topical daily  cholestyramine Powder (Sugar-Free) 4 Gram(s) Oral daily  collagenase Ointment 1 Application(s) Topical two times a day  dextrose 40% Gel 15 Gram(s) Oral once  dextrose 5%. 1000 milliLiter(s) (50 mL/Hr) IV Continuous <Continuous>  dextrose 5%. 1000 milliLiter(s) (100 mL/Hr) IV Continuous <Continuous>  dextrose 50% Injectable 25 Gram(s) IV Push once  dextrose 50% Injectable 12.5 Gram(s) IV Push once  dextrose 50% Injectable 25 Gram(s) IV Push once  enoxaparin Injectable 100 milliGRAM(s) SubCutaneous every 12 hours  glucagon  Injectable 1 milliGRAM(s) IntraMuscular once  insulin lispro (ADMELOG) corrective regimen sliding scale   SubCutaneous every 6 hours  lactobacillus acidophilus 1 Tablet(s) Oral two times a day  metoprolol tartrate 50 milliGRAM(s) Oral two times a day  multivitamin 1 Tablet(s) Oral daily  pantoprazole  Injectable 40 milliGRAM(s) IV Push daily  vancomycin    Solution 250 milliGRAM(s) Oral every 6 hours  zinc sulfate 220 milliGRAM(s) Oral daily    MEDICATIONS  (PRN):  acetaminophen    Suspension .. 650 milliGRAM(s) Oral every 6 hours PRN Temp greater or equal to 38C (100.4F), Mild Pain (1 - 3), Moderate Pain (4 - 6)  artificial tears (preservative free) Ophthalmic Solution 1 Drop(s) Both EYES every 8 hours PRN Dry Eyes  oxyCODONE    Solution 5 milliGRAM(s) Oral every 4 hours PRN Severe Pain (7 - 10)    LABS:                        10.1   9.04  )-----------( 390      ( 13 Feb 2021 07:41 )             34.2     02-13    138  |  104  |  10  ----------------------------<  140<H>  3.5   |  22  |  0.24<L>    Ca    9.5      13 Feb 2021 07:41  Phos  3.9     02-13  Mg     1.6     02-13

## 2021-02-15 LAB
ANION GAP SERPL CALC-SCNC: 13 MMOL/L — SIGNIFICANT CHANGE UP (ref 7–14)
BUN SERPL-MCNC: 11 MG/DL — SIGNIFICANT CHANGE UP (ref 7–23)
CALCIUM SERPL-MCNC: 9.7 MG/DL — SIGNIFICANT CHANGE UP (ref 8.4–10.5)
CHLORIDE SERPL-SCNC: 103 MMOL/L — SIGNIFICANT CHANGE UP (ref 98–107)
CO2 SERPL-SCNC: 22 MMOL/L — SIGNIFICANT CHANGE UP (ref 22–31)
CREAT SERPL-MCNC: 0.25 MG/DL — LOW (ref 0.5–1.3)
GLUCOSE BLDC GLUCOMTR-MCNC: 109 MG/DL — HIGH (ref 70–99)
GLUCOSE BLDC GLUCOMTR-MCNC: 124 MG/DL — HIGH (ref 70–99)
GLUCOSE BLDC GLUCOMTR-MCNC: 125 MG/DL — HIGH (ref 70–99)
GLUCOSE BLDC GLUCOMTR-MCNC: 125 MG/DL — HIGH (ref 70–99)
GLUCOSE SERPL-MCNC: 123 MG/DL — HIGH (ref 70–99)
HCT VFR BLD CALC: 36.1 % — SIGNIFICANT CHANGE UP (ref 34.5–45)
HGB BLD-MCNC: 10.2 G/DL — LOW (ref 11.5–15.5)
MAGNESIUM SERPL-MCNC: 1.6 MG/DL — SIGNIFICANT CHANGE UP (ref 1.6–2.6)
MCHC RBC-ENTMCNC: 27.1 PG — SIGNIFICANT CHANGE UP (ref 27–34)
MCHC RBC-ENTMCNC: 28.3 GM/DL — LOW (ref 32–36)
MCV RBC AUTO: 95.8 FL — SIGNIFICANT CHANGE UP (ref 80–100)
NRBC # BLD: 0 /100 WBCS — SIGNIFICANT CHANGE UP
NRBC # FLD: 0 K/UL — SIGNIFICANT CHANGE UP
PHOSPHATE SERPL-MCNC: 4.1 MG/DL — SIGNIFICANT CHANGE UP (ref 2.5–4.5)
PLATELET # BLD AUTO: 388 K/UL — SIGNIFICANT CHANGE UP (ref 150–400)
POTASSIUM SERPL-MCNC: 3.6 MMOL/L — SIGNIFICANT CHANGE UP (ref 3.5–5.3)
POTASSIUM SERPL-SCNC: 3.6 MMOL/L — SIGNIFICANT CHANGE UP (ref 3.5–5.3)
RBC # BLD: 3.77 M/UL — LOW (ref 3.8–5.2)
RBC # FLD: 16.3 % — HIGH (ref 10.3–14.5)
SODIUM SERPL-SCNC: 138 MMOL/L — SIGNIFICANT CHANGE UP (ref 135–145)
WBC # BLD: 8.57 K/UL — SIGNIFICANT CHANGE UP (ref 3.8–10.5)
WBC # FLD AUTO: 8.57 K/UL — SIGNIFICANT CHANGE UP (ref 3.8–10.5)

## 2021-02-15 PROCEDURE — 99233 SBSQ HOSP IP/OBS HIGH 50: CPT | Mod: GC

## 2021-02-15 RX ORDER — CLONAZEPAM 1 MG
0.5 TABLET ORAL ONCE
Refills: 0 | Status: DISCONTINUED | OUTPATIENT
Start: 2021-02-15 | End: 2021-02-15

## 2021-02-15 RX ORDER — CLONAZEPAM 1 MG
0.5 TABLET ORAL DAILY
Refills: 0 | Status: DISCONTINUED | OUTPATIENT
Start: 2021-02-16 | End: 2021-02-18

## 2021-02-15 RX ADMIN — PANTOPRAZOLE SODIUM 40 MILLIGRAM(S): 20 TABLET, DELAYED RELEASE ORAL at 12:42

## 2021-02-15 RX ADMIN — ENOXAPARIN SODIUM 100 MILLIGRAM(S): 100 INJECTION SUBCUTANEOUS at 18:25

## 2021-02-15 RX ADMIN — Medication 250 MILLIGRAM(S): at 18:24

## 2021-02-15 RX ADMIN — Medication 1 TABLET(S): at 06:16

## 2021-02-15 RX ADMIN — Medication 250 MILLIGRAM(S): at 23:35

## 2021-02-15 RX ADMIN — Medication 50 MILLIGRAM(S): at 06:16

## 2021-02-15 RX ADMIN — Medication 0.5 MILLIGRAM(S): at 13:41

## 2021-02-15 RX ADMIN — Medication 650 MILLIGRAM(S): at 13:41

## 2021-02-15 RX ADMIN — Medication 500 MILLIGRAM(S): at 12:39

## 2021-02-15 RX ADMIN — Medication 250 MILLIGRAM(S): at 06:16

## 2021-02-15 RX ADMIN — Medication 50 MILLIGRAM(S): at 18:25

## 2021-02-15 RX ADMIN — ZINC SULFATE TAB 220 MG (50 MG ZINC EQUIVALENT) 220 MILLIGRAM(S): 220 (50 ZN) TAB at 12:39

## 2021-02-15 RX ADMIN — ALBUTEROL 2 PUFF(S): 90 AEROSOL, METERED ORAL at 21:58

## 2021-02-15 RX ADMIN — Medication 1 APPLICATION(S): at 06:18

## 2021-02-15 RX ADMIN — OXYCODONE HYDROCHLORIDE 5 MILLIGRAM(S): 5 TABLET ORAL at 12:38

## 2021-02-15 RX ADMIN — ALBUTEROL 2 PUFF(S): 90 AEROSOL, METERED ORAL at 15:55

## 2021-02-15 RX ADMIN — ENOXAPARIN SODIUM 100 MILLIGRAM(S): 100 INJECTION SUBCUTANEOUS at 06:17

## 2021-02-15 RX ADMIN — OXYCODONE HYDROCHLORIDE 5 MILLIGRAM(S): 5 TABLET ORAL at 07:08

## 2021-02-15 RX ADMIN — Medication 1 TABLET(S): at 12:40

## 2021-02-15 RX ADMIN — ALBUTEROL 2 PUFF(S): 90 AEROSOL, METERED ORAL at 11:53

## 2021-02-15 RX ADMIN — ALBUTEROL 2 PUFF(S): 90 AEROSOL, METERED ORAL at 03:48

## 2021-02-15 RX ADMIN — CHOLESTYRAMINE 4 GRAM(S): 4 POWDER, FOR SUSPENSION ORAL at 10:45

## 2021-02-15 RX ADMIN — Medication 1 DROP(S): at 12:39

## 2021-02-15 RX ADMIN — Medication 250 MILLIGRAM(S): at 12:38

## 2021-02-15 RX ADMIN — Medication 250 MILLIGRAM(S): at 00:30

## 2021-02-15 RX ADMIN — AMLODIPINE BESYLATE 5 MILLIGRAM(S): 2.5 TABLET ORAL at 06:17

## 2021-02-15 RX ADMIN — Medication 1 TABLET(S): at 18:25

## 2021-02-15 RX ADMIN — Medication 1 APPLICATION(S): at 18:25

## 2021-02-15 NOTE — PROGRESS NOTE ADULT - SUBJECTIVE AND OBJECTIVE BOX
CHIEF COMPLAINT: Patient is a 60y old  Female who presents with a chief complaint of Transfer from [facility] (14 Feb 2021 08:57)      Interval Events: Pt seen and evaluated at  bedside     REVIEW OF SYSTEMS:  Constitutional:   Eyes:  ENT:  CV:  Resp:  GI:  :  MSK:  Integumentary:  Neurological:  Psychiatric:  Endocrine:  Hematologic/Lymphatic:  Allergic/Immunologic:  [ ] All other systems negative    OBJECTIVE:  ICU Vital Signs Last 24 Hrs  T(C): 36.7 (15 Feb 2021 04:00), Max: 37 (14 Feb 2021 20:00)  T(F): 98 (15 Feb 2021 04:00), Max: 98.6 (14 Feb 2021 20:00)  HR: 100 (15 Feb 2021 03:50) (81 - 100)  BP: 153/63 (15 Feb 2021 04:00) (136/64 - 153/77)  BP(mean): 91 (15 Feb 2021 04:00) (84 - 95)  ABP: --  ABP(mean): --  RR: 18 (15 Feb 2021 04:00) (18 - 25)  SpO2: 98% (15 Feb 2021 04:00) (97% - 100%)    Mode: AC/ CMV (Assist Control/ Continuous Mandatory Ventilation), RR (machine): 12, TV (machine): 450, FiO2: 25, PEEP: 5, MAP: 11, PIP: 13    02-14 @ 07:01  -  02-15 @ 07:00  --------------------------------------------------------  IN: 2728 mL / OUT: 3060 mL / NET: -332 mL      CAPILLARY BLOOD GLUCOSE      POCT Blood Glucose.: 125 mg/dL (15 Feb 2021 05:14)        HOSPITAL MEDICATIONS:  MEDICATIONS  (STANDING):  ALBUTerol    90 MICROgram(s) HFA Inhaler 2 Puff(s) Inhalation every 6 hours  amLODIPine   Tablet 5 milliGRAM(s) Oral daily  ascorbic acid 500 milliGRAM(s) Oral daily  chlorhexidine 4% Liquid 1 Application(s) Topical daily  cholestyramine Powder (Sugar-Free) 4 Gram(s) Oral daily  collagenase Ointment 1 Application(s) Topical two times a day  dextrose 40% Gel 15 Gram(s) Oral once  dextrose 5%. 1000 milliLiter(s) (50 mL/Hr) IV Continuous <Continuous>  dextrose 5%. 1000 milliLiter(s) (100 mL/Hr) IV Continuous <Continuous>  dextrose 50% Injectable 25 Gram(s) IV Push once  dextrose 50% Injectable 12.5 Gram(s) IV Push once  dextrose 50% Injectable 25 Gram(s) IV Push once  enoxaparin Injectable 100 milliGRAM(s) SubCutaneous every 12 hours  glucagon  Injectable 1 milliGRAM(s) IntraMuscular once  insulin lispro (ADMELOG) corrective regimen sliding scale   SubCutaneous every 6 hours  lactobacillus acidophilus 1 Tablet(s) Oral two times a day  metoprolol tartrate 50 milliGRAM(s) Oral two times a day  multivitamin 1 Tablet(s) Oral daily  pantoprazole  Injectable 40 milliGRAM(s) IV Push daily  vancomycin    Solution 250 milliGRAM(s) Oral every 6 hours  zinc sulfate 220 milliGRAM(s) Oral daily    MEDICATIONS  (PRN):  acetaminophen    Suspension .. 650 milliGRAM(s) Oral every 6 hours PRN Temp greater or equal to 38C (100.4F), Mild Pain (1 - 3), Moderate Pain (4 - 6)  artificial tears (preservative free) Ophthalmic Solution 1 Drop(s) Both EYES every 8 hours PRN Dry Eyes  oxyCODONE    Solution 5 milliGRAM(s) Oral every 4 hours PRN Severe Pain (7 - 10)      LABS:                        10.2   8.57  )-----------( 388      ( 15 Feb 2021 07:03 )             36.1     02-15    138  |  103  |  11  ----------------------------<  123<H>  3.6   |  22  |  0.25<L>    Ca    9.7      15 Feb 2021 06:37  Phos  4.1     02-15  Mg     1.6     02-15                MICROBIOLOGY:     RADIOLOGY:  [ ] Reviewed and interpreted by me    PULMONARY FUNCTION TESTS:    EKG: CHIEF COMPLAINT: Patient is a 60y old  Female who presents with a chief complaint of Transfer from [facility] (14 Feb 2021 08:57)      Interval Events: Pt seen and evaluated at  bedside     REVIEW OF SYSTEMS:  Constitutional: Denies pain /fevers   CV: Denies   Resp: + MCINTOSH   GI: Denies   MSK: Weak   Psychiatric: Anxious   [x ] All other systems negative    OBJECTIVE:  ICU Vital Signs Last 24 Hrs  T(C): 36.7 (15 Feb 2021 04:00), Max: 37 (14 Feb 2021 20:00)  T(F): 98 (15 Feb 2021 04:00), Max: 98.6 (14 Feb 2021 20:00)  HR: 100 (15 Feb 2021 03:50) (81 - 100)  BP: 153/63 (15 Feb 2021 04:00) (136/64 - 153/77)  BP(mean): 91 (15 Feb 2021 04:00) (84 - 95)  ABP: --  ABP(mean): --  RR: 18 (15 Feb 2021 04:00) (18 - 25)  SpO2: 98% (15 Feb 2021 04:00) (97% - 100%)    Mode: AC/ CMV (Assist Control/ Continuous Mandatory Ventilation), RR (machine): 12, TV (machine): 450, FiO2: 25, PEEP: 5, MAP: 11, PIP: 13    02-14 @ 07:01  -  02-15 @ 07:00  --------------------------------------------------------  IN: 2728 mL / OUT: 3060 mL / NET: -332 mL      CAPILLARY BLOOD GLUCOSE      POCT Blood Glucose.: 125 mg/dL (15 Feb 2021 05:14)        HOSPITAL MEDICATIONS:  MEDICATIONS  (STANDING):  ALBUTerol    90 MICROgram(s) HFA Inhaler 2 Puff(s) Inhalation every 6 hours  amLODIPine   Tablet 5 milliGRAM(s) Oral daily  ascorbic acid 500 milliGRAM(s) Oral daily  chlorhexidine 4% Liquid 1 Application(s) Topical daily  cholestyramine Powder (Sugar-Free) 4 Gram(s) Oral daily  collagenase Ointment 1 Application(s) Topical two times a day  dextrose 40% Gel 15 Gram(s) Oral once  dextrose 5%. 1000 milliLiter(s) (50 mL/Hr) IV Continuous <Continuous>  dextrose 5%. 1000 milliLiter(s) (100 mL/Hr) IV Continuous <Continuous>  dextrose 50% Injectable 25 Gram(s) IV Push once  dextrose 50% Injectable 12.5 Gram(s) IV Push once  dextrose 50% Injectable 25 Gram(s) IV Push once  enoxaparin Injectable 100 milliGRAM(s) SubCutaneous every 12 hours  glucagon  Injectable 1 milliGRAM(s) IntraMuscular once  insulin lispro (ADMELOG) corrective regimen sliding scale   SubCutaneous every 6 hours  lactobacillus acidophilus 1 Tablet(s) Oral two times a day  metoprolol tartrate 50 milliGRAM(s) Oral two times a day  multivitamin 1 Tablet(s) Oral daily  pantoprazole  Injectable 40 milliGRAM(s) IV Push daily  vancomycin    Solution 250 milliGRAM(s) Oral every 6 hours  zinc sulfate 220 milliGRAM(s) Oral daily    MEDICATIONS  (PRN):  acetaminophen    Suspension .. 650 milliGRAM(s) Oral every 6 hours PRN Temp greater or equal to 38C (100.4F), Mild Pain (1 - 3), Moderate Pain (4 - 6)  artificial tears (preservative free) Ophthalmic Solution 1 Drop(s) Both EYES every 8 hours PRN Dry Eyes  oxyCODONE    Solution 5 milliGRAM(s) Oral every 4 hours PRN Severe Pain (7 - 10)      LABS:                        10.2   8.57  )-----------( 388      ( 15 Feb 2021 07:03 )             36.1     02-15    138  |  103  |  11  ----------------------------<  123<H>  3.6   |  22  |  0.25<L>    Ca    9.7      15 Feb 2021 06:37  Phos  4.1     02-15  Mg     1.6     02-15                MICROBIOLOGY:     RADIOLOGY:  [ ] Reviewed and interpreted by me    PULMONARY FUNCTION TESTS:    EKG:

## 2021-02-15 NOTE — PROGRESS NOTE ADULT - ASSESSMENT
61 YO F with PMHx of HTN, DVT on Xarelto, Peritonitis s/p Oral's Procedure and Ileostomy (reversed in 2011) and AARON who presented to [facility] on 11/18 for AHRF second to COVID 19, intubated on 11/23 complicated by R lung abscesses on CT CHEST 12/5, multibacterial PNA and bacteremia, ARTEMIO s/p CRRT and HD, and s/p Tracheostomy 12/18 c/b track leak and s/p OR 8Bivona. Transferred to [facility] for furhter management.     # AMS   - AOX3 at baseline.   - CTH and EEG WNL   - Now weaned off all sedation.   - Monitor mentation and supportive care     # ARDS second to COVID vs Multi-bacterial PNA/ R Lung Abscess  - Intubated 11/23 and course complicated with multi-bacterial PNA and lung abscesses.   - Lung abscess presented on CT CHEST 12/5  - s/p Tracheostomy on 12/18 by CTSx. Sutures out 1/1/2021  - Air leak noted and s/p Trach Exchange for Bivona with CTSx 12/31  - Currently on FULL Vent QHS, tolerates PS well and will attempt TC today.   - Proventil, IPV and Chest PT Q6H and Suction PRN. Trach Care QD.     # Vasoplegic vs Septic Shock   - Weaned off pressors. Monitor BP on Norvasc and Metoprolol.   - On full AC for Hx of DVT. CTA CHEST with no PEs.     # ARTEMIO s/p CRRT and HD   - ARTEMIO now resolved and no longer required HD.   - Hypokalemia in setting of diarrhea/ CDIFF. Monitor electrolytes.   - Monitor renal function and avoid nephrotoxins.     # GI   - Initially CTSx and GI deferred PEG given Ventral Hernia   - s/p PEG placement with IR 1/11   - Continue on TF and monitor tolerance.   - Monitor diarrhea as CDIFF (+). Hold laxatives and continue on PO Vancomycin as below     # Sepsis second to COVID vs Multi-bacterial PNA/ Bacteremia and R Lung Abscess vs CDIFF (+)   - COVID with superimposed multi-bacterial VAP vs aspiration PNA vs cavitary PNA.   - SCx (11/24) MSSA and BCx (11/27) with MSSA and Proteus Bacteremia  - SCx (12/1, 12/12 and 12/27) with Stenotrophomonas.   - SCx (1/9) with  Pseudomonas and Stenotrophomonas   - BCX has been persistently negative from 12/1, and most recently 1/12   - CT CHEST 12/4 with right lung cavitation.   - s/p multiple antibiotics, but now completed Fortaz and Flagyl (1/12-1/16)  - RPT CT CHEST 1/28 with improvement of right lung abscess   - SCx with  Pseudomonas and Stenotrophomonas and s/p TED/ Fortaz (1/28-2/6)    # CDIFF (+)   - Started on PO Vancomycin (2/1 - 2/10)  - Dose increased/ extending to 14 days until 2/14 second to continued diarrhea. D/W ID Friday and dose increased to 250mg q6 with fu on Monday with ID   - Questran daily to help with bulking stool   - ID following     # DM2   - Continue on ISS and NPH and monitor FS.   - Adjust insulin as needed.     # Hx of DVT   - Continue on Lovenox FULL DOSE for Xarelto for now.     # Skin/ Facial Wounds   - WOC recommendations appreciated   - Plastics evaluation and recommendations of facial wound appreciated     # Contracted Wrists   - PT working on strength. OT consulted.     # DVT/ GI PPX with FULL AC/ PPI   # CODE STATUS - FULL CODE   # DISPO - PT recc Rehab/ Vent Facility  61 YO F with PMHx of HTN, DVT on Xarelto, Peritonitis s/p Oral's Procedure and Ileostomy (reversed in 2011) and AARON who presented to [facility] on 11/18 for AHRF second to COVID 19, intubated on 11/23 complicated by R lung abscesses on CT CHEST 12/5, multibacterial PNA and bacteremia, ARTEMIO s/p CRRT and HD, and s/p Tracheostomy 12/18 c/b track leak and s/p OR 8Bivona. Transferred to [facility] for furhter management.     # AMS   - AOX3 at baseline.   - CTH and EEG WNL   - Now weaned off all sedation.   - Monitor mentation and supportive care   - Pt/staff report pt with anxiety when PS /or PT - + tremors -will add clonopin and eval     # ARDS second to COVID vs Multi-bacterial PNA/ R Lung Abscess  - Intubated 11/23 and course complicated with multi-bacterial PNA and lung abscesses.   - Lung abscess presented on CT CHEST 12/5  - s/p Tracheostomy on 12/18 by CTSx. Sutures out 1/1/2021  - Air leak noted and s/p Trach Exchange for Bivona with CTSx 12/31  - Currently on FULL Vent QHS, tolerates PS well and will attempt TC today.   - Proventil, IPV and Chest PT Q6H and Suction PRN. Trach Care QD.     # Vasoplegic vs Septic Shock   - Weaned off pressors. Monitor BP on Norvasc and Metoprolol.   - On full AC for Hx of DVT. CTA CHEST with no PEs.     # ARTEMIO s/p CRRT and HD   - ARTEMIO now resolved and no longer required HD.   - Hypokalemia in setting of diarrhea/ CDIFF. Monitor electrolytes.   - Monitor renal function and avoid nephrotoxins.     # GI   - Initially CTSx and GI deferred PEG given Ventral Hernia   - s/p PEG placement with IR 1/11   - Continue on TF and monitor tolerance.   - Monitor diarrhea as CDIFF (+). Hold laxatives and continue on PO Vancomycin as below     # Sepsis second to COVID vs Multi-bacterial PNA/ Bacteremia and R Lung Abscess vs CDIFF (+)   - COVID with superimposed multi-bacterial VAP vs aspiration PNA vs cavitary PNA.   - SCx (11/24) MSSA and BCx (11/27) with MSSA and Proteus Bacteremia  - SCx (12/1, 12/12 and 12/27) with Stenotrophomonas.   - SCx (1/9) with  Pseudomonas and Stenotrophomonas   - BCX has been persistently negative from 12/1, and most recently 1/12   - CT CHEST 12/4 with right lung cavitation.   - s/p multiple antibiotics, but now completed Fortaz and Flagyl (1/12-1/16)  - RPT CT CHEST 1/28 with improvement of right lung abscess   - SCx with  Pseudomonas and Stenotrophomonas and s/p TED/ Fortaz (1/28-2/6)    # CDIFF (+)   - Started on PO Vancomycin (2/1 - 2/10)  - Dose increased/ extending to 14 days until 2/14 second to continued diarrhea. D/W ID Friday and dose increased to 250mg q6 with fu on Monday with ID   - Questran daily to help with bulking stool   - ID following     # DM2   - Continue on ISS and NPH and monitor FS.   - Adjust insulin as needed.     # Hx of DVT   - Continue on Lovenox FULL DOSE for Xarelto for now.     # Skin/ Facial Wounds   - WOC recommendations appreciated   - Plastics evaluation and recommendations of facial wound appreciated     # Contracted Wrists   - PT working on strength. OT consulted.     # DVT/ GI PPX with FULL AC/ PPI   # CODE STATUS - FULL CODE   # DISPO - PT recc Rehab/ Vent Facility

## 2021-02-16 LAB
GLUCOSE BLDC GLUCOMTR-MCNC: 114 MG/DL — HIGH (ref 70–99)
GLUCOSE BLDC GLUCOMTR-MCNC: 136 MG/DL — HIGH (ref 70–99)
GLUCOSE BLDC GLUCOMTR-MCNC: 143 MG/DL — HIGH (ref 70–99)
GLUCOSE BLDC GLUCOMTR-MCNC: 144 MG/DL — HIGH (ref 70–99)

## 2021-02-16 PROCEDURE — 99233 SBSQ HOSP IP/OBS HIGH 50: CPT

## 2021-02-16 RX ADMIN — Medication 0.5 MILLIGRAM(S): at 12:15

## 2021-02-16 RX ADMIN — ALBUTEROL 2 PUFF(S): 90 AEROSOL, METERED ORAL at 17:05

## 2021-02-16 RX ADMIN — ALBUTEROL 2 PUFF(S): 90 AEROSOL, METERED ORAL at 23:10

## 2021-02-16 RX ADMIN — Medication 50 MILLIGRAM(S): at 18:07

## 2021-02-16 RX ADMIN — Medication 250 MILLIGRAM(S): at 12:16

## 2021-02-16 RX ADMIN — PANTOPRAZOLE SODIUM 40 MILLIGRAM(S): 20 TABLET, DELAYED RELEASE ORAL at 12:17

## 2021-02-16 RX ADMIN — CHLORHEXIDINE GLUCONATE 1 APPLICATION(S): 213 SOLUTION TOPICAL at 12:17

## 2021-02-16 RX ADMIN — Medication 250 MILLIGRAM(S): at 18:07

## 2021-02-16 RX ADMIN — OXYCODONE HYDROCHLORIDE 5 MILLIGRAM(S): 5 TABLET ORAL at 06:41

## 2021-02-16 RX ADMIN — ALBUTEROL 2 PUFF(S): 90 AEROSOL, METERED ORAL at 04:58

## 2021-02-16 RX ADMIN — ENOXAPARIN SODIUM 100 MILLIGRAM(S): 100 INJECTION SUBCUTANEOUS at 18:07

## 2021-02-16 RX ADMIN — ALBUTEROL 2 PUFF(S): 90 AEROSOL, METERED ORAL at 10:51

## 2021-02-16 RX ADMIN — AMLODIPINE BESYLATE 5 MILLIGRAM(S): 2.5 TABLET ORAL at 06:17

## 2021-02-16 RX ADMIN — ZINC SULFATE TAB 220 MG (50 MG ZINC EQUIVALENT) 220 MILLIGRAM(S): 220 (50 ZN) TAB at 12:15

## 2021-02-16 RX ADMIN — Medication 1 TABLET(S): at 12:17

## 2021-02-16 RX ADMIN — OXYCODONE HYDROCHLORIDE 5 MILLIGRAM(S): 5 TABLET ORAL at 18:06

## 2021-02-16 RX ADMIN — Medication 1 TABLET(S): at 06:23

## 2021-02-16 RX ADMIN — Medication 500 MILLIGRAM(S): at 12:17

## 2021-02-16 RX ADMIN — Medication 50 MILLIGRAM(S): at 06:18

## 2021-02-16 RX ADMIN — Medication 1 APPLICATION(S): at 06:21

## 2021-02-16 RX ADMIN — Medication 1 TABLET(S): at 18:07

## 2021-02-16 RX ADMIN — Medication 250 MILLIGRAM(S): at 06:21

## 2021-02-16 RX ADMIN — Medication 1 APPLICATION(S): at 18:07

## 2021-02-16 RX ADMIN — ENOXAPARIN SODIUM 100 MILLIGRAM(S): 100 INJECTION SUBCUTANEOUS at 06:18

## 2021-02-16 RX ADMIN — CHOLESTYRAMINE 4 GRAM(S): 4 POWDER, FOR SUSPENSION ORAL at 12:16

## 2021-02-16 NOTE — PROGRESS NOTE ADULT - ATTENDING COMMENTS
61 YO F with PMHx of HTN, DVT on Xarelto, and AARON  p/w AHRF second to COVID 19,  complicated by R lung abscesses, ARTEMIO s/p CRRT and HD, and s/p Tracheostomy 12/18 c/b trach leak and s/p   8Bivona and now cdiff. Pt awake and alert and denies complaints. Still has diarrhea but seems to be improving. cont vanco for cdiff. Cont to have significant weakness of all extremities caleb UE. Cont PT and OT.

## 2021-02-16 NOTE — PROGRESS NOTE ADULT - ASSESSMENT
59 YO F with PMHx of HTN, DVT on Xarelto, Peritonitis s/p Oral's Procedure and Ileostomy (reversed in 2011) and AARON who presented to [facility] on 11/18 for AHRF second to COVID 19, intubated on 11/23 complicated by R lung abscesses on CT CHEST 12/5, multibacterial PNA and bacteremia, ARTEMIO s/p CRRT and HD, and s/p Tracheostomy 12/18 c/b track leak and s/p OR 8Bivona. Transferred to [facility] for furhter management.     # AMS   - AOX3 at baseline.   - CTH and EEG WNL   - Now weaned off all sedation.   - Monitor mentation and supportive care   - Pt/staff report pt with anxiety when PS /or PT - + tremors -will add clonopin and eval     # ARDS second to COVID vs Multi-bacterial PNA/ R Lung Abscess  - Intubated 11/23 and course complicated with multi-bacterial PNA and lung abscesses.   - Lung abscess presented on CT CHEST 12/5  - s/p Tracheostomy on 12/18 by CTSx. Sutures out 1/1/2021  - Air leak noted and s/p Trach Exchange for Bivona with CTSx 12/31  - Currently on FULL Vent QHS, tolerates PS well and will attempt TC today.   - Proventil, IPV and Chest PT Q6H and Suction PRN. Trach Care QD.     # Vasoplegic vs Septic Shock   - Weaned off pressors. Monitor BP on Norvasc and Metoprolol.   - On full AC for Hx of DVT. CTA CHEST with no PEs.     # ARTEMIO s/p CRRT and HD   - ARTEMIO now resolved and no longer required HD.   - Hypokalemia in setting of diarrhea/ CDIFF. Monitor electrolytes.   - Monitor renal function and avoid nephrotoxins.     # GI   - Initially CTSx and GI deferred PEG given Ventral Hernia   - s/p PEG placement with IR 1/11   - Continue on TF and monitor tolerance.   - Monitor diarrhea as CDIFF (+). Hold laxatives and continue on PO Vancomycin as below     # Sepsis second to COVID vs Multi-bacterial PNA/ Bacteremia and R Lung Abscess vs CDIFF (+)   - COVID with superimposed multi-bacterial VAP vs aspiration PNA vs cavitary PNA.   - SCx (11/24) MSSA and BCx (11/27) with MSSA and Proteus Bacteremia  - SCx (12/1, 12/12 and 12/27) with Stenotrophomonas.   - SCx (1/9) with  Pseudomonas and Stenotrophomonas   - BCX has been persistently negative from 12/1, and most recently 1/12   - CT CHEST 12/4 with right lung cavitation.   - s/p multiple antibiotics, but now completed Fortaz and Flagyl (1/12-1/16)  - RPT CT CHEST 1/28 with improvement of right lung abscess   - SCx with  Pseudomonas and Stenotrophomonas and s/p TED/ Fortaz (1/28-2/6)    # CDIFF (+)   - Started on PO Vancomycin (2/1 - 2/10)  - Dose increased/ extending to 14 days until 2/14 second to continued diarrhea. D/W ID Friday and dose increased to 250mg q6 with fu on Monday with ID   - Questran daily to help with bulking stool   - ID following     # DM2   - Continue on ISS and NPH and monitor FS.   - Adjust insulin as needed.     # Hx of DVT   - Continue on Lovenox FULL DOSE for Xarelto for now.     # Skin/ Facial Wounds   - WOC recommendations appreciated   - Plastics evaluation and recommendations of facial wound appreciated     # Contracted Wrists   - PT working on strength. OT consulted.     # DVT/ GI PPX with FULL AC/ PPI   # CODE STATUS - FULL CODE   # DISPO - PT recc Rehab/ Vent Facility  59 YO F with PMHx of HTN, DVT on Xarelto, Peritonitis s/p Oral's Procedure and Ileostomy (reversed in 2011) and AARON who presented to [facility] on 11/18 for AHRF second to COVID 19, intubated on 11/23 complicated by R lung abscesses on CT CHEST 12/5, multibacterial PNA and bacteremia, ARTEMIO s/p CRRT and HD, and s/p Tracheostomy 12/18 c/b track leak and s/p OR 8Bivona. Transferred to [facility] for furhter management.     # AMS   - AOX3 at baseline.   - CTH and EEG WNL   - Now weaned off all sedation.   - Monitor mentation and supportive care   - Pt/staff report pt with anxiety when PS /or PT - + tremors -will add clonopin and eval  - Doing well on Klonopin- less anxious not drowsy      # ARDS second to COVID vs Multi-bacterial PNA/ R Lung Abscess  - Intubated 11/23 and course complicated with multi-bacterial PNA and lung abscesses.   - Lung abscess presented on CT CHEST 12/5  - s/p Tracheostomy on 12/18 by CTSx. Sutures out 1/1/2021  - Air leak noted and s/p Trach Exchange for Bivona with CTSx 12/31  - Currently on FULL Vent QHS, tolerates PS well and will attempt TC today.   - Proventil, IPV and Chest PT Q6H and Suction PRN. Trach Care QD.     # Vasoplegic vs Septic Shock   - Weaned off pressors. Monitor BP on Norvasc and Metoprolol.   - On full AC for Hx of DVT. CTA CHEST with no PEs.     # ARTEMIO s/p CRRT and HD   - ARTEMIO now resolved and no longer required HD.   - Hypokalemia in setting of diarrhea/ CDIFF. Monitor electrolytes.   - Monitor renal function and avoid nephrotoxins.     # GI   - Initially CTSx and GI deferred PEG given Ventral Hernia   - s/p PEG placement with IR 1/11   - Continue on TF and monitor tolerance.   - Monitor diarrhea as CDIFF (+). Hold laxatives and continue on PO Vancomycin as below     # Sepsis second to COVID vs Multi-bacterial PNA/ Bacteremia and R Lung Abscess vs CDIFF (+)   - COVID with superimposed multi-bacterial VAP vs aspiration PNA vs cavitary PNA.   - SCx (11/24) MSSA and BCx (11/27) with MSSA and Proteus Bacteremia  - SCx (12/1, 12/12 and 12/27) with Stenotrophomonas.   - SCx (1/9) with  Pseudomonas and Stenotrophomonas   - BCX has been persistently negative from 12/1, and most recently 1/12   - CT CHEST 12/4 with right lung cavitation.   - s/p multiple antibiotics, but now completed Fortaz and Flagyl (1/12-1/16)  - RPT CT CHEST 1/28 with improvement of right lung abscess   - SCx with  Pseudomonas and Stenotrophomonas and s/p TED/ Fortaz (1/28-2/6)    # CDIFF (+)   - Started on PO Vancomycin (2/1 - 2/10)  - Dose increased/ extending to 14 days until 2/14 second to continued diarrhea. D/W ID Friday and dose increased to 250mg q6 with fu on Monday with ID   - Questran daily to help with bulking stool   - ID following   - Less stool noted today     # DM2   - Continue on ISS and NPH and monitor FS.   - Adjust insulin as needed.     # Hx of DVT   - Continue on Lovenox FULL DOSE for Xarelto for now.     # Skin/ Facial Wounds   - WOC recommendations appreciated   - Plastics evaluation and recommendations of facial wound appreciated     # Contracted Wrists   - PT working on strength. OT consulted.     # DVT/ GI PPX with FULL AC/ PPI   # CODE STATUS - FULL CODE   # DISPO - PT recc Rehab/ Vent Facility

## 2021-02-16 NOTE — DIETITIAN NUTRITION RISK NOTIFICATION - TREATMENT: THE FOLLOWING DIET HAS BEEN RECOMMENDED
Diet, NPO with Tube Feed:   Tube Feeding Modality: Gastrostomy  Peptamen 1.5 (PEPTAMEN1.5RTH)  Total Volume for 24 Hours (mL): 1080  Until Goal Tube Feed Rate (mL per Hour): 45  Tube Feed Duration (in Hours): 24  Tube Feed Start Time: 14:30  No Carb Prosource (1pkg = 15gms Protein)     Qty per Day:  2

## 2021-02-16 NOTE — CHART NOTE - NSCHARTNOTEFT_GEN_A_CORE
Nutrition Follow-Up Note   Reason for follow-up: Length of stay follow- up. Medical record reviewed.    Clinical Course history: Patient with medical history of HTN, DVT on Xarelto, Peritonitis s/p Oral's Procedure and Ileostomy (reversed in 2011) and AARON who presented to [facility] on 11/18 for AHRF second to COVID 19, intubated on 11/23 complicated by R lung abscesses on CT CHEST 12/5, multibacterial PNA and bacteremia, ARTEMIO s/p CRRT and HD, and s/p Tracheostomy 12/18 c/b track leak and s/p OR 8Bivona. Transferred to [facility] for further management.     Diet Order: Diet, NPO with Tube Feed:   Tube Feeding Modality: Gastrostomy  Peptamen 1.5 (PEPTAMEN1.5RTH)  Total Volume for 24 Hours (mL): 1008  Continuous  Starting Tube Feed Rate {mL per Hour}: 25  Increase Tube Feed Rate by (mL): 10     Every 4 hours  Until Goal Tube Feed Rate (mL per Hour): 42  Tube Feed Duration (in Hours): 24  Tube Feed Start Time: 14:30  No Carb Prosource (1pkg = 15gms Protein)     Qty per Day:  3 (01-28-21 @ 14:23)    CURRENT EN ORDER PROVIDES:  Total Volume: 1008mL/day  Energy: 1512Kcal/day  Protein: 113g protein/day  Free Water: 778mL/day    Nutrition Related Information: - Remains on tube feeds of Peptamen 1.5 - running at goal rate of 42mL/hr.   - No tube feeding intolerances reported.   - Glucose levels WDL at this time.   - Would increase goal rate of feeds slightly to better meet energy needs. Recs to follow below.    GI: - Noted diarrhea - 250mL rectal output x24 hours per flowsheets.  - C. Diff (+).  - On probiotics.  - No vomiting reported.     Anthropometric Measurements:   Height (cm): 160 (12-10-20 @ 14:29)  Weight (kg): 109.2 (2/16, bed-scale weight), 111.5 (2/07), 115 (12/10)  **5.3% weight loss in ~2 months  BMI (kg/m2): 42.7  IBW: 52.3kg +/-10%  nutrition focused physical exam: severe temporal wasting, moderate orbital fat pad depletion    Medications: MEDICATIONS  (STANDING):  amLODIPine   Tablet 5 milliGRAM(s) Oral daily  ascorbic acid 500 milliGRAM(s) Oral daily  cholestyramine Powder (Sugar-Free) 4 Gram(s) Oral daily  clonazePAM  Tablet 0.5 milliGRAM(s) Oral daily  enoxaparin Injectable 100 milliGRAM(s) SubCutaneous every 12 hours  glucagon  Injectable 1 milliGRAM(s) IntraMuscular once  insulin lispro (ADMELOG) corrective regimen sliding scale   SubCutaneous every 6 hours  lactobacillus acidophilus 1 Tablet(s) Oral two times a day  metoprolol tartrate 50 milliGRAM(s) Oral two times a day  multivitamin 1 Tablet(s) Oral daily  pantoprazole  Injectable 40 milliGRAM(s) IV Push daily  vancomycin    Solution 250 milliGRAM(s) Oral every 6 hours  zinc sulfate 220 milliGRAM(s) Oral daily    Labs: POCT Blood Glucose.: 114 mg/dL (02-16-21 @ 06:20)  POCT Blood Glucose.: 109 mg/dL (02-15-21 @ 23:33)  POCT Blood Glucose.: 124 mg/dL (02-15-21 @ 16:53)  POCT Blood Glucose.: 125 mg/dL (02-15-21 @ 11:50)    Skin: chin wound, sacrum unstageable pressure injury  Edema: 1+ left/right hand, leg    Estimated Nutrition Needs: calculated using most recent weight 109.2kg for energy needs and IBW for protein needs  Estimated Energy Needs (15-18Kcal/kg): 1638-1965Kcal/day  Estimated Protein Needs (2g/kg): 104g protein/day    New Nutrition Diagnosis: Malnutrition, moderate in the context of acute illness related to physiological causes As evidenced by 5.3% weight loss in 1.5 months, moderate orbital fat loss, severe temporal wasting.    Nutrition Interventions/Recommendations:   1. Recommend Peptamen 1.5 via PEG @ 45mL/hr x24 hours + No Carb Prosource 2x daily [provides 1080mL total volume, 1620Kcal, 103g protein, 833mL free water daily].  2. Additional free water per physician discretion.  3. Continue multivitamin daily for micronutrient coverage.  4. Continue probiotic supplementation as clinically indicated.     Monitoring and Evaluation:   Monitor nutrition provision, tolerance to diet, weights, labs, skin integrity and GI function.   RD remains available, please consult as needed. Will follow up per protocol.  Xochitl Larson, MS, RD, CDN, CNSC Pager #[phone]

## 2021-02-16 NOTE — PROGRESS NOTE ADULT - SUBJECTIVE AND OBJECTIVE BOX
CHIEF COMPLAINT: Patient is a 60y old  Female who presents with a chief complaint of Transfer from [facility] (15 Feb 2021 08:27)      Interval Events: Pt seen and evaluated by team. Anxiety noted with PS/PT tremors/ will trial Klonopin and eval     REVIEW OF SYSTEMS:  Constitutional:   Eyes:  ENT:  CV:  Resp:  GI:  :  MSK:  Integumentary:  Neurological:  Psychiatric:  Endocrine:  Hematologic/Lymphatic:  Allergic/Immunologic:  [ ] All other systems negative  [ ] Unable to assess ROS because ________    OBJECTIVE:  ICU Vital Signs Last 24 Hrs  T(C): 36.4 (16 Feb 2021 04:00), Max: 37.1 (15 Feb 2021 16:00)  T(F): 97.6 (16 Feb 2021 04:00), Max: 98.8 (15 Feb 2021 16:00)  HR: 83 (16 Feb 2021 07:37) (83 - 99)  BP: 141/69 (16 Feb 2021 04:00) (140/46 - 156/56)  BP(mean): 92 (16 Feb 2021 04:00) (70 - 100)  ABP: --  ABP(mean): --  RR: 22 (16 Feb 2021 04:00) (18 - 22)  SpO2: 97% (16 Feb 2021 07:37) (95% - 100%)    Mode: AC/ CMV (Assist Control/ Continuous Mandatory Ventilation), RR (machine): 12, TV (machine): 450, FiO2: 25, PEEP: 5, MAP: 13, PIP: 23    02-15 @ 07:01  -  02-16 @ 07:00  --------------------------------------------------------  IN: 895 mL / OUT: 1100 mL / NET: -205 mL      CAPILLARY BLOOD GLUCOSE      POCT Blood Glucose.: 114 mg/dL (16 Feb 2021 06:20)      HOSPITAL MEDICATIONS:  MEDICATIONS  (STANDING):  ALBUTerol    90 MICROgram(s) HFA Inhaler 2 Puff(s) Inhalation every 6 hours  amLODIPine   Tablet 5 milliGRAM(s) Oral daily  ascorbic acid 500 milliGRAM(s) Oral daily  chlorhexidine 4% Liquid 1 Application(s) Topical daily  cholestyramine Powder (Sugar-Free) 4 Gram(s) Oral daily  clonazePAM  Tablet 0.5 milliGRAM(s) Oral daily  collagenase Ointment 1 Application(s) Topical two times a day  dextrose 40% Gel 15 Gram(s) Oral once  dextrose 5%. 1000 milliLiter(s) (50 mL/Hr) IV Continuous <Continuous>  dextrose 5%. 1000 milliLiter(s) (100 mL/Hr) IV Continuous <Continuous>  dextrose 50% Injectable 25 Gram(s) IV Push once  dextrose 50% Injectable 12.5 Gram(s) IV Push once  dextrose 50% Injectable 25 Gram(s) IV Push once  enoxaparin Injectable 100 milliGRAM(s) SubCutaneous every 12 hours  glucagon  Injectable 1 milliGRAM(s) IntraMuscular once  insulin lispro (ADMELOG) corrective regimen sliding scale   SubCutaneous every 6 hours  lactobacillus acidophilus 1 Tablet(s) Oral two times a day  metoprolol tartrate 50 milliGRAM(s) Oral two times a day  multivitamin 1 Tablet(s) Oral daily  pantoprazole  Injectable 40 milliGRAM(s) IV Push daily  vancomycin    Solution 250 milliGRAM(s) Oral every 6 hours  zinc sulfate 220 milliGRAM(s) Oral daily    MEDICATIONS  (PRN):  acetaminophen    Suspension .. 650 milliGRAM(s) Oral every 6 hours PRN Temp greater or equal to 38C (100.4F), Mild Pain (1 - 3), Moderate Pain (4 - 6)  artificial tears (preservative free) Ophthalmic Solution 1 Drop(s) Both EYES every 8 hours PRN Dry Eyes  oxyCODONE    Solution 5 milliGRAM(s) Oral every 4 hours PRN Severe Pain (7 - 10)      LABS:                        10.2   8.57  )-----------( 388      ( 15 Feb 2021 07:03 )             36.1     02-15    138  |  103  |  11  ----------------------------<  123<H>  3.6   |  22  |  0.25<L>    Ca    9.7      15 Feb 2021 06:37  Phos  4.1     02-15  Mg     1.6     02-15                MICROBIOLOGY:     RADIOLOGY:  [ ] Reviewed and interpreted by me    PULMONARY FUNCTION TESTS:    EKG: CHIEF COMPLAINT: Patient is a 60y old  Female who presents with a chief complaint of Transfer from [facility] (15 Feb 2021 08:27)      Interval Events: Pt seen and evaluated by team. Anxiety noted with PS/PT tremors/ will trial Klonopin and eval     REVIEW OF SYSTEMS:  Constitutional: No pain /fevers   CV: denies   Resp: Denies   GI: loose stool   MSK: Denies   [x ] All other systems negative  [ ] Unable to assess ROS because ________    OBJECTIVE:  ICU Vital Signs Last 24 Hrs  T(C): 36.4 (16 Feb 2021 04:00), Max: 37.1 (15 Feb 2021 16:00)  T(F): 97.6 (16 Feb 2021 04:00), Max: 98.8 (15 Feb 2021 16:00)  HR: 83 (16 Feb 2021 07:37) (83 - 99)  BP: 141/69 (16 Feb 2021 04:00) (140/46 - 156/56)  BP(mean): 92 (16 Feb 2021 04:00) (70 - 100)  ABP: --  ABP(mean): --  RR: 22 (16 Feb 2021 04:00) (18 - 22)  SpO2: 97% (16 Feb 2021 07:37) (95% - 100%)    Mode: AC/ CMV (Assist Control/ Continuous Mandatory Ventilation), RR (machine): 12, TV (machine): 450, FiO2: 25, PEEP: 5, MAP: 13, PIP: 23    02-15 @ 07:01  -  02-16 @ 07:00  --------------------------------------------------------  IN: 895 mL / OUT: 1100 mL / NET: -205 mL      CAPILLARY BLOOD GLUCOSE      POCT Blood Glucose.: 114 mg/dL (16 Feb 2021 06:20)      HOSPITAL MEDICATIONS:  MEDICATIONS  (STANDING):  ALBUTerol    90 MICROgram(s) HFA Inhaler 2 Puff(s) Inhalation every 6 hours  amLODIPine   Tablet 5 milliGRAM(s) Oral daily  ascorbic acid 500 milliGRAM(s) Oral daily  chlorhexidine 4% Liquid 1 Application(s) Topical daily  cholestyramine Powder (Sugar-Free) 4 Gram(s) Oral daily  clonazePAM  Tablet 0.5 milliGRAM(s) Oral daily  collagenase Ointment 1 Application(s) Topical two times a day  dextrose 40% Gel 15 Gram(s) Oral once  dextrose 5%. 1000 milliLiter(s) (50 mL/Hr) IV Continuous <Continuous>  dextrose 5%. 1000 milliLiter(s) (100 mL/Hr) IV Continuous <Continuous>  dextrose 50% Injectable 25 Gram(s) IV Push once  dextrose 50% Injectable 12.5 Gram(s) IV Push once  dextrose 50% Injectable 25 Gram(s) IV Push once  enoxaparin Injectable 100 milliGRAM(s) SubCutaneous every 12 hours  glucagon  Injectable 1 milliGRAM(s) IntraMuscular once  insulin lispro (ADMELOG) corrective regimen sliding scale   SubCutaneous every 6 hours  lactobacillus acidophilus 1 Tablet(s) Oral two times a day  metoprolol tartrate 50 milliGRAM(s) Oral two times a day  multivitamin 1 Tablet(s) Oral daily  pantoprazole  Injectable 40 milliGRAM(s) IV Push daily  vancomycin    Solution 250 milliGRAM(s) Oral every 6 hours  zinc sulfate 220 milliGRAM(s) Oral daily    MEDICATIONS  (PRN):  acetaminophen    Suspension .. 650 milliGRAM(s) Oral every 6 hours PRN Temp greater or equal to 38C (100.4F), Mild Pain (1 - 3), Moderate Pain (4 - 6)  artificial tears (preservative free) Ophthalmic Solution 1 Drop(s) Both EYES every 8 hours PRN Dry Eyes  oxyCODONE    Solution 5 milliGRAM(s) Oral every 4 hours PRN Severe Pain (7 - 10)      LABS:                        10.2   8.57  )-----------( 388      ( 15 Feb 2021 07:03 )             36.1     02-15    138  |  103  |  11  ----------------------------<  123<H>  3.6   |  22  |  0.25<L>    Ca    9.7      15 Feb 2021 06:37  Phos  4.1     02-15  Mg     1.6     02-15                MICROBIOLOGY:     RADIOLOGY:  [ ] Reviewed and interpreted by me    PULMONARY FUNCTION TESTS:    EKG:

## 2021-02-17 LAB
GLUCOSE BLDC GLUCOMTR-MCNC: 128 MG/DL — HIGH (ref 70–99)
GLUCOSE BLDC GLUCOMTR-MCNC: 143 MG/DL — HIGH (ref 70–99)
GLUCOSE BLDC GLUCOMTR-MCNC: 149 MG/DL — HIGH (ref 70–99)
GLUCOSE BLDC GLUCOMTR-MCNC: 165 MG/DL — HIGH (ref 70–99)
HCT VFR BLD CALC: 36.4 % — SIGNIFICANT CHANGE UP (ref 34.5–45)
HGB BLD-MCNC: 10.7 G/DL — LOW (ref 11.5–15.5)
MAGNESIUM SERPL-MCNC: 1.6 MG/DL — SIGNIFICANT CHANGE UP (ref 1.6–2.6)
MCHC RBC-ENTMCNC: 28 PG — SIGNIFICANT CHANGE UP (ref 27–34)
MCHC RBC-ENTMCNC: 29.4 GM/DL — LOW (ref 32–36)
MCV RBC AUTO: 95.3 FL — SIGNIFICANT CHANGE UP (ref 80–100)
NRBC # BLD: 0 /100 WBCS — SIGNIFICANT CHANGE UP
NRBC # FLD: 0 K/UL — SIGNIFICANT CHANGE UP
PHOSPHATE SERPL-MCNC: 3.8 MG/DL — SIGNIFICANT CHANGE UP (ref 2.5–4.5)
PLATELET # BLD AUTO: 391 K/UL — SIGNIFICANT CHANGE UP (ref 150–400)
RBC # BLD: 3.82 M/UL — SIGNIFICANT CHANGE UP (ref 3.8–5.2)
RBC # FLD: 16.5 % — HIGH (ref 10.3–14.5)
WBC # BLD: 9.77 K/UL — SIGNIFICANT CHANGE UP (ref 3.8–10.5)
WBC # FLD AUTO: 9.77 K/UL — SIGNIFICANT CHANGE UP (ref 3.8–10.5)

## 2021-02-17 PROCEDURE — 99233 SBSQ HOSP IP/OBS HIGH 50: CPT

## 2021-02-17 RX ADMIN — Medication 250 MILLIGRAM(S): at 12:21

## 2021-02-17 RX ADMIN — Medication 250 MILLIGRAM(S): at 06:08

## 2021-02-17 RX ADMIN — Medication 250 MILLIGRAM(S): at 00:18

## 2021-02-17 RX ADMIN — ENOXAPARIN SODIUM 100 MILLIGRAM(S): 100 INJECTION SUBCUTANEOUS at 06:07

## 2021-02-17 RX ADMIN — CHLORHEXIDINE GLUCONATE 1 APPLICATION(S): 213 SOLUTION TOPICAL at 12:23

## 2021-02-17 RX ADMIN — OXYCODONE HYDROCHLORIDE 5 MILLIGRAM(S): 5 TABLET ORAL at 17:09

## 2021-02-17 RX ADMIN — Medication 50 MILLIGRAM(S): at 17:11

## 2021-02-17 RX ADMIN — ALBUTEROL 2 PUFF(S): 90 AEROSOL, METERED ORAL at 15:52

## 2021-02-17 RX ADMIN — AMLODIPINE BESYLATE 5 MILLIGRAM(S): 2.5 TABLET ORAL at 06:08

## 2021-02-17 RX ADMIN — OXYCODONE HYDROCHLORIDE 5 MILLIGRAM(S): 5 TABLET ORAL at 00:27

## 2021-02-17 RX ADMIN — Medication 2: at 12:20

## 2021-02-17 RX ADMIN — ALBUTEROL 2 PUFF(S): 90 AEROSOL, METERED ORAL at 04:30

## 2021-02-17 RX ADMIN — Medication 500 MILLIGRAM(S): at 12:22

## 2021-02-17 RX ADMIN — Medication 1 TABLET(S): at 12:22

## 2021-02-17 RX ADMIN — Medication 0.5 MILLIGRAM(S): at 12:21

## 2021-02-17 RX ADMIN — ENOXAPARIN SODIUM 100 MILLIGRAM(S): 100 INJECTION SUBCUTANEOUS at 17:10

## 2021-02-17 RX ADMIN — Medication 250 MILLIGRAM(S): at 23:35

## 2021-02-17 RX ADMIN — Medication 1 TABLET(S): at 06:07

## 2021-02-17 RX ADMIN — PANTOPRAZOLE SODIUM 40 MILLIGRAM(S): 20 TABLET, DELAYED RELEASE ORAL at 12:23

## 2021-02-17 RX ADMIN — Medication 250 MILLIGRAM(S): at 17:10

## 2021-02-17 RX ADMIN — Medication 1 TABLET(S): at 17:10

## 2021-02-17 RX ADMIN — ALBUTEROL 2 PUFF(S): 90 AEROSOL, METERED ORAL at 21:50

## 2021-02-17 RX ADMIN — CHOLESTYRAMINE 4 GRAM(S): 4 POWDER, FOR SUSPENSION ORAL at 12:22

## 2021-02-17 RX ADMIN — Medication 1 APPLICATION(S): at 17:10

## 2021-02-17 RX ADMIN — Medication 50 MILLIGRAM(S): at 06:07

## 2021-02-17 RX ADMIN — Medication 1 APPLICATION(S): at 06:08

## 2021-02-17 RX ADMIN — ZINC SULFATE TAB 220 MG (50 MG ZINC EQUIVALENT) 220 MILLIGRAM(S): 220 (50 ZN) TAB at 12:22

## 2021-02-17 NOTE — PROGRESS NOTE ADULT - SUBJECTIVE AND OBJECTIVE BOX
CHIEF COMPLAINT: Patient is a 60y old  Female who presents with a chief complaint of Transfer from [facility] (16 Feb 2021 07:41)      Interval Events: Seen and evaluated at bedside with team.  Diarrhea less yesterday still watery     REVIEW OF SYSTEMS:  Constitutional:   Eyes:  ENT:  CV:  Resp:  GI:  :  MSK:  Integumentary:  Neurological:  Psychiatric:  Endocrine:  Hematologic/Lymphatic:  Allergic/Immunologic:  [ ] All other systems negative  [ ] Unable to assess ROS because ________    OBJECTIVE:  ICU Vital Signs Last 24 Hrs  T(C): 36.9 (17 Feb 2021 06:03), Max: 36.9 (17 Feb 2021 06:03)  T(F): 98.5 (17 Feb 2021 06:03), Max: 98.5 (17 Feb 2021 06:03)  HR: 99 (17 Feb 2021 06:03) (82 - 105)  BP: 137/52 (17 Feb 2021 06:03) (137/52 - 146/50)  BP(mean): --  ABP: --  ABP(mean): --  RR: 22 (17 Feb 2021 06:03) (20 - 25)  SpO2: 100% (17 Feb 2021 06:03) (97% - 100%)    Mode: AC/ CMV (Assist Control/ Continuous Mandatory Ventilation), RR (machine): 12, TV (machine): 450, FiO2: 25, PEEP: 5, MAP: 9, PIP: 19    02-16 @ 07:01  -  02-17 @ 07:00  --------------------------------------------------------  IN: 800 mL / OUT: 1450 mL / NET: -650 mL      CAPILLARY BLOOD GLUCOSE      POCT Blood Glucose.: 143 mg/dL (17 Feb 2021 05:32)      PHYSICAL EXAM:  General:   HEENT:   Lymph Nodes:  Neck:   Respiratory:   Cardiovascular:   Abdomen:   Extremities:   Skin:   Neurological:  Psychiatry:    HOSPITAL MEDICATIONS:  MEDICATIONS  (STANDING):  ALBUTerol    90 MICROgram(s) HFA Inhaler 2 Puff(s) Inhalation every 6 hours  amLODIPine   Tablet 5 milliGRAM(s) Oral daily  ascorbic acid 500 milliGRAM(s) Oral daily  chlorhexidine 4% Liquid 1 Application(s) Topical daily  cholestyramine Powder (Sugar-Free) 4 Gram(s) Oral daily  clonazePAM  Tablet 0.5 milliGRAM(s) Oral daily  collagenase Ointment 1 Application(s) Topical two times a day  dextrose 40% Gel 15 Gram(s) Oral once  dextrose 5%. 1000 milliLiter(s) (50 mL/Hr) IV Continuous <Continuous>  dextrose 5%. 1000 milliLiter(s) (100 mL/Hr) IV Continuous <Continuous>  dextrose 50% Injectable 25 Gram(s) IV Push once  dextrose 50% Injectable 12.5 Gram(s) IV Push once  dextrose 50% Injectable 25 Gram(s) IV Push once  enoxaparin Injectable 100 milliGRAM(s) SubCutaneous every 12 hours  glucagon  Injectable 1 milliGRAM(s) IntraMuscular once  insulin lispro (ADMELOG) corrective regimen sliding scale   SubCutaneous every 6 hours  lactobacillus acidophilus 1 Tablet(s) Oral two times a day  metoprolol tartrate 50 milliGRAM(s) Oral two times a day  multivitamin 1 Tablet(s) Oral daily  pantoprazole  Injectable 40 milliGRAM(s) IV Push daily  vancomycin    Solution 250 milliGRAM(s) Oral every 6 hours  zinc sulfate 220 milliGRAM(s) Oral daily    MEDICATIONS  (PRN):  acetaminophen    Suspension .. 650 milliGRAM(s) Oral every 6 hours PRN Temp greater or equal to 38C (100.4F), Mild Pain (1 - 3), Moderate Pain (4 - 6)  artificial tears (preservative free) Ophthalmic Solution 1 Drop(s) Both EYES every 8 hours PRN Dry Eyes  oxyCODONE    Solution 5 milliGRAM(s) Oral every 4 hours PRN Severe Pain (7 - 10)      LABS:                    MICROBIOLOGY:     RADIOLOGY:  [ ] Reviewed and interpreted by me    PULMONARY FUNCTION TESTS:    EKG: CHIEF COMPLAINT: Patient is a 60y old  Female who presents with a chief complaint of Transfer from [facility] (16 Feb 2021 07:41)      Interval Events: Seen and evaluated at bedside with team.  Diarrhea less yesterday still watery     REVIEW OF SYSTEMS:  Constitutional: Denies pain   CV: Denies   Resp: Denies   GI: Denies   Psychiatric: Less anxious   Hematologic/Lymphatic:  Allergic/Immunologic:  [x ] All other systems negative      OBJECTIVE:  ICU Vital Signs Last 24 Hrs  T(C): 36.9 (17 Feb 2021 06:03), Max: 36.9 (17 Feb 2021 06:03)  T(F): 98.5 (17 Feb 2021 06:03), Max: 98.5 (17 Feb 2021 06:03)  HR: 99 (17 Feb 2021 06:03) (82 - 105)  BP: 137/52 (17 Feb 2021 06:03) (137/52 - 146/50)  BP(mean): --  ABP: --  ABP(mean): --  RR: 22 (17 Feb 2021 06:03) (20 - 25)  SpO2: 100% (17 Feb 2021 06:03) (97% - 100%)    Mode: AC/ CMV (Assist Control/ Continuous Mandatory Ventilation), RR (machine): 12, TV (machine): 450, FiO2: 25, PEEP: 5, MAP: 9, PIP: 19    02-16 @ 07:01  -  02-17 @ 07:00  --------------------------------------------------------  IN: 800 mL / OUT: 1450 mL / NET: -650 mL      CAPILLARY BLOOD GLUCOSE      POCT Blood Glucose.: 143 mg/dL (17 Feb 2021 05:32)      HOSPITAL MEDICATIONS:  MEDICATIONS  (STANDING):  ALBUTerol    90 MICROgram(s) HFA Inhaler 2 Puff(s) Inhalation every 6 hours  amLODIPine   Tablet 5 milliGRAM(s) Oral daily  ascorbic acid 500 milliGRAM(s) Oral daily  chlorhexidine 4% Liquid 1 Application(s) Topical daily  cholestyramine Powder (Sugar-Free) 4 Gram(s) Oral daily  clonazePAM  Tablet 0.5 milliGRAM(s) Oral daily  collagenase Ointment 1 Application(s) Topical two times a day  dextrose 40% Gel 15 Gram(s) Oral once  dextrose 5%. 1000 milliLiter(s) (50 mL/Hr) IV Continuous <Continuous>  dextrose 5%. 1000 milliLiter(s) (100 mL/Hr) IV Continuous <Continuous>  dextrose 50% Injectable 25 Gram(s) IV Push once  dextrose 50% Injectable 12.5 Gram(s) IV Push once  dextrose 50% Injectable 25 Gram(s) IV Push once  enoxaparin Injectable 100 milliGRAM(s) SubCutaneous every 12 hours  glucagon  Injectable 1 milliGRAM(s) IntraMuscular once  insulin lispro (ADMELOG) corrective regimen sliding scale   SubCutaneous every 6 hours  lactobacillus acidophilus 1 Tablet(s) Oral two times a day  metoprolol tartrate 50 milliGRAM(s) Oral two times a day  multivitamin 1 Tablet(s) Oral daily  pantoprazole  Injectable 40 milliGRAM(s) IV Push daily  vancomycin    Solution 250 milliGRAM(s) Oral every 6 hours  zinc sulfate 220 milliGRAM(s) Oral daily    MEDICATIONS  (PRN):  acetaminophen    Suspension .. 650 milliGRAM(s) Oral every 6 hours PRN Temp greater or equal to 38C (100.4F), Mild Pain (1 - 3), Moderate Pain (4 - 6)  artificial tears (preservative free) Ophthalmic Solution 1 Drop(s) Both EYES every 8 hours PRN Dry Eyes  oxyCODONE    Solution 5 milliGRAM(s) Oral every 4 hours PRN Severe Pain (7 - 10)      LABS:                    MICROBIOLOGY:     RADIOLOGY:  [ ] Reviewed and interpreted by me    PULMONARY FUNCTION TESTS:    EKG:

## 2021-02-17 NOTE — PROGRESS NOTE ADULT - ATTENDING COMMENTS
61 YO F with PMHx of HTN, DVT on Xarelto, and AARON  p/w AHRF second to COVID 19,  complicated by R lung abscesses, ARTEMIO s/p CRRT and HD, and s/p Tracheostomy 12/18 c/b trach leak and s/p   8Bivona and now cdiff. Pt awake and alert and denies complaints. Still has diarrhea but  improving. cont vanco for cdiff. Cont to have significant weakness of all extremities caleb UE. Cont PT and OT. Cont daily PS trials

## 2021-02-17 NOTE — PROGRESS NOTE ADULT - NUTRITIONAL ASSESSMENT
This patient has been assessed with a concern for Malnutrition and has been determined to have a diagnosis/diagnoses of Severe protein-calorie malnutrition and Morbid obesity (BMI > 40).    This patient is being managed with:   Diet NPO with Tube Feed-  Tube Feeding Modality: Gastrostomy  Peptamen 1.5 (PEPTAMEN1.5RTH)  Total Volume for 24 Hours (mL): 1080  Continuous  Until Goal Tube Feed Rate (mL per Hour): 45  Tube Feed Duration (in Hours): 24  Tube Feed Start Time: 12:00  No Carb Prosource (1pkg = 15gms Protein)     Qty per Day:  2  Entered: Feb 16 2021 11:49AM    Diet NPO with Tube Feed-  Tube Feeding Modality: Gastrostomy  Peptamen 1.5 (PEPTAMEN1.5RTH)  Total Volume for 24 Hours (mL): 1008  Continuous  Starting Tube Feed Rate {mL per Hour}: 25  Increase Tube Feed Rate by (mL): 10     Every 4 hours  Until Goal Tube Feed Rate (mL per Hour): 42  Tube Feed Duration (in Hours): 24  Tube Feed Start Time: 14:30  No Carb Prosource (1pkg = 15gms Protein)     Qty per Day:  3  Entered: Jan 28 2021  2:23PM    The following pending diet order is being considered for treatment of Severe protein-calorie malnutrition and Morbid obesity (BMI > 40):null

## 2021-02-17 NOTE — PROGRESS NOTE ADULT - ASSESSMENT
59 YO F with PMHx of HTN, DVT on Xarelto, Peritonitis s/p Oral's Procedure and Ileostomy (reversed in 2011) and AARON who presented to [facility] on 11/18 for AHRF second to COVID 19, intubated on 11/23 complicated by R lung abscesses on CT CHEST 12/5, multibacterial PNA and bacteremia, ARTEMIO s/p CRRT and HD, and s/p Tracheostomy 12/18 c/b track leak and s/p OR 8Bivona. Transferred to [facility] for furhter management.     # AMS   - AOX3 at baseline.   - CTH and EEG WNL   - Now weaned off all sedation.   - Monitor mentation and supportive care   - Pt/staff report pt with anxiety when PS /or PT - + tremors -will add clonopin and eval  - Doing well on Klonopin- less anxious not drowsy      # ARDS second to COVID vs Multi-bacterial PNA/ R Lung Abscess  - Intubated 11/23 and course complicated with multi-bacterial PNA and lung abscesses.   - Lung abscess presented on CT CHEST 12/5  - s/p Tracheostomy on 12/18 by CTSx. Sutures out 1/1/2021  - Air leak noted and s/p Trach Exchange for Bivona with CTSx 12/31  - Currently on FULL Vent QHS, tolerates PS well and will attempt TC today.   - Proventil, IPV and Chest PT Q6H and Suction PRN. Trach Care QD.     # Vasoplegic vs Septic Shock   - Weaned off pressors. Monitor BP on Norvasc and Metoprolol.   - On full AC for Hx of DVT. CTA CHEST with no PEs.     # ARTEMIO s/p CRRT and HD   - ARTEMIO now resolved and no longer required HD.   - Hypokalemia in setting of diarrhea/ CDIFF. Monitor electrolytes.   - Monitor renal function and avoid nephrotoxins.     # GI   - Initially CTSx and GI deferred PEG given Ventral Hernia   - s/p PEG placement with IR 1/11   - Continue on TF and monitor tolerance.   - Monitor diarrhea as CDIFF (+). Hold laxatives and continue on PO Vancomycin as below     # Sepsis second to COVID vs Multi-bacterial PNA/ Bacteremia and R Lung Abscess vs CDIFF (+)   - COVID with superimposed multi-bacterial VAP vs aspiration PNA vs cavitary PNA.   - SCx (11/24) MSSA and BCx (11/27) with MSSA and Proteus Bacteremia  - SCx (12/1, 12/12 and 12/27) with Stenotrophomonas.   - SCx (1/9) with  Pseudomonas and Stenotrophomonas   - BCX has been persistently negative from 12/1, and most recently 1/12   - CT CHEST 12/4 with right lung cavitation.   - s/p multiple antibiotics, but now completed Fortaz and Flagyl (1/12-1/16)  - RPT CT CHEST 1/28 with improvement of right lung abscess   - SCx with  Pseudomonas and Stenotrophomonas and s/p TED/ Fortaz (1/28-2/6)    # CDIFF (+)   - Started on PO Vancomycin (2/1 - 2/10)  - Dose increased/ extending to 14 days until 2/14 second to continued diarrhea. D/W ID Friday and dose increased to 250mg q6 with fu on Monday with ID   - Questran daily to help with bulking stool   - ID following   - Less stool noted today     # DM2   - Continue on ISS and NPH and monitor FS.   - Adjust insulin as needed.     # Hx of DVT   - Continue on Lovenox FULL DOSE for Xarelto for now.     # Skin/ Facial Wounds   - WOC recommendations appreciated   - Plastics evaluation and recommendations of facial wound appreciated     # Contracted Wrists   - PT working on strength. OT consulted.     # DVT/ GI PPX with FULL AC/ PPI   # CODE STATUS - FULL CODE   # DISPO - PT recc Rehab/ Vent Facility  61 YO F with PMHx of HTN, DVT on Xarelto, Peritonitis s/p Oral's Procedure and Ileostomy (reversed in 2011) and AARON who presented to [facility] on 11/18 for AHRF second to COVID 19, intubated on 11/23 complicated by R lung abscesses on CT CHEST 12/5, multibacterial PNA and bacteremia, ARTEMIO s/p CRRT and HD, and s/p Tracheostomy 12/18 c/b track leak and s/p OR 8Bivona. Transferred to [facility] for furhter management.     # AMS   - AOX3 at baseline.   - CTH and EEG WNL   - Now weaned off all sedation.   - Monitor mentation and supportive care   - Pt/staff report pt with anxiety when PS /or PT - + tremors -will add clonopin and eval  - Doing well on Klonopin- less anxious not drowsy      # ARDS second to COVID vs Multi-bacterial PNA/ R Lung Abscess  - Intubated 11/23 and course complicated with multi-bacterial PNA and lung abscesses.   - Lung abscess presented on CT CHEST 12/5  - s/p Tracheostomy on 12/18 by CTSx. Sutures out 1/1/2021  - Air leak noted and s/p Trach Exchange for Bivona with CTSx 12/31  - Currently on FULL Vent QHS, tolerates PS well and will attempt TC today.   - Proventil, IPV and Chest PT Q6H and Suction PRN. Trach Care QD.     # Vasoplegic vs Septic Shock   - Weaned off pressors. Monitor BP on Norvasc and Metoprolol.   - On full AC for Hx of DVT. CTA CHEST with no PEs.     # ARTEMIO s/p CRRT and HD   - ARTEMIO now resolved and no longer required HD.   - Hypokalemia in setting of diarrhea/ CDIFF. Monitor electrolytes.   - Monitor renal function and avoid nephrotoxins.     # GI   - Initially CTSx and GI deferred PEG given Ventral Hernia   - s/p PEG placement with IR 1/11   - Continue on TF and monitor tolerance.   - Monitor diarrhea as CDIFF (+). Hold laxatives and continue on PO Vancomycin as below     # Sepsis second to COVID vs Multi-bacterial PNA/ Bacteremia and R Lung Abscess vs CDIFF (+)   - COVID with superimposed multi-bacterial VAP vs aspiration PNA vs cavitary PNA.   - SCx (11/24) MSSA and BCx (11/27) with MSSA and Proteus Bacteremia  - SCx (12/1, 12/12 and 12/27) with Stenotrophomonas.   - SCx (1/9) with  Pseudomonas and Stenotrophomonas   - BCX has been persistently negative from 12/1, and most recently 1/12   - CT CHEST 12/4 with right lung cavitation.   - s/p multiple antibiotics, but now completed Fortaz and Flagyl (1/12-1/16)  - RPT CT CHEST 1/28 with improvement of right lung abscess   - SCx with  Pseudomonas and Stenotrophomonas and s/p TED/ Fortaz (1/28-2/6)    # CDIFF (+)   - Started on PO Vancomycin (2/1 - 2/10)  - Dose increased/ extending to 14 days until 2/14 second to continued diarrhea. D/W ID Friday and dose increased to 250mg q6 with fu on Monday with ID   - Questran daily to help with bulking stool   - ID following   - Less stool noted today follow output and need for flexi     # DM2   - Continue on ISS and NPH and monitor FS.   - Adjust insulin as needed.     # Hx of DVT   - Continue on Lovenox FULL DOSE for Xarelto for now.     # Skin/ Facial Wounds   - WOC recommendations appreciated   - Plastics evaluation and recommendations of facial wound appreciated     # Contracted Wrists   - PT working on strength. OT consulted.     # DVT/ GI PPX with FULL AC/ PPI   # CODE STATUS - FULL CODE   # DISPO - PT recc Rehab/ Vent Facility

## 2021-02-18 LAB
ANION GAP SERPL CALC-SCNC: 13 MMOL/L — SIGNIFICANT CHANGE UP (ref 7–14)
BUN SERPL-MCNC: 11 MG/DL — SIGNIFICANT CHANGE UP (ref 7–23)
CALCIUM SERPL-MCNC: 9.7 MG/DL — SIGNIFICANT CHANGE UP (ref 8.4–10.5)
CHLORIDE SERPL-SCNC: 103 MMOL/L — SIGNIFICANT CHANGE UP (ref 98–107)
CO2 SERPL-SCNC: 21 MMOL/L — LOW (ref 22–31)
CREAT SERPL-MCNC: 0.25 MG/DL — LOW (ref 0.5–1.3)
GLUCOSE BLDC GLUCOMTR-MCNC: 128 MG/DL — HIGH (ref 70–99)
GLUCOSE BLDC GLUCOMTR-MCNC: 141 MG/DL — HIGH (ref 70–99)
GLUCOSE BLDC GLUCOMTR-MCNC: 143 MG/DL — HIGH (ref 70–99)
GLUCOSE BLDC GLUCOMTR-MCNC: 176 MG/DL — HIGH (ref 70–99)
GLUCOSE SERPL-MCNC: 162 MG/DL — HIGH (ref 70–99)
HCT VFR BLD CALC: 34 % — LOW (ref 34.5–45)
HGB BLD-MCNC: 9.8 G/DL — LOW (ref 11.5–15.5)
MAGNESIUM SERPL-MCNC: 1.7 MG/DL — SIGNIFICANT CHANGE UP (ref 1.6–2.6)
MCHC RBC-ENTMCNC: 27.6 PG — SIGNIFICANT CHANGE UP (ref 27–34)
MCHC RBC-ENTMCNC: 28.8 GM/DL — LOW (ref 32–36)
MCV RBC AUTO: 95.8 FL — SIGNIFICANT CHANGE UP (ref 80–100)
NRBC # BLD: 0 /100 WBCS — SIGNIFICANT CHANGE UP
NRBC # FLD: 0 K/UL — SIGNIFICANT CHANGE UP
PHOSPHATE SERPL-MCNC: 3.8 MG/DL — SIGNIFICANT CHANGE UP (ref 2.5–4.5)
PLATELET # BLD AUTO: 362 K/UL — SIGNIFICANT CHANGE UP (ref 150–400)
POTASSIUM SERPL-MCNC: 3.5 MMOL/L — SIGNIFICANT CHANGE UP (ref 3.5–5.3)
POTASSIUM SERPL-SCNC: 3.5 MMOL/L — SIGNIFICANT CHANGE UP (ref 3.5–5.3)
RBC # BLD: 3.55 M/UL — LOW (ref 3.8–5.2)
RBC # FLD: 16.4 % — HIGH (ref 10.3–14.5)
SODIUM SERPL-SCNC: 137 MMOL/L — SIGNIFICANT CHANGE UP (ref 135–145)
WBC # BLD: 10.3 K/UL — SIGNIFICANT CHANGE UP (ref 3.8–10.5)
WBC # FLD AUTO: 10.3 K/UL — SIGNIFICANT CHANGE UP (ref 3.8–10.5)

## 2021-02-18 PROCEDURE — 99233 SBSQ HOSP IP/OBS HIGH 50: CPT

## 2021-02-18 RX ORDER — CLONAZEPAM 1 MG
0.5 TABLET ORAL DAILY
Refills: 0 | Status: DISCONTINUED | OUTPATIENT
Start: 2021-02-18 | End: 2021-02-25

## 2021-02-18 RX ORDER — MAGNESIUM SULFATE 500 MG/ML
1 VIAL (ML) INJECTION ONCE
Refills: 0 | Status: COMPLETED | OUTPATIENT
Start: 2021-02-18 | End: 2021-02-18

## 2021-02-18 RX ORDER — NYSTATIN CREAM 100000 [USP'U]/G
1 CREAM TOPICAL EVERY 8 HOURS
Refills: 0 | Status: DISCONTINUED | OUTPATIENT
Start: 2021-02-18 | End: 2021-03-19

## 2021-02-18 RX ORDER — OXYCODONE HYDROCHLORIDE 5 MG/1
5 TABLET ORAL EVERY 4 HOURS
Refills: 0 | Status: DISCONTINUED | OUTPATIENT
Start: 2021-02-18 | End: 2021-02-25

## 2021-02-18 RX ORDER — POTASSIUM CHLORIDE 20 MEQ
20 PACKET (EA) ORAL ONCE
Refills: 0 | Status: COMPLETED | OUTPATIENT
Start: 2021-02-18 | End: 2021-02-18

## 2021-02-18 RX ADMIN — PANTOPRAZOLE SODIUM 40 MILLIGRAM(S): 20 TABLET, DELAYED RELEASE ORAL at 11:31

## 2021-02-18 RX ADMIN — OXYCODONE HYDROCHLORIDE 5 MILLIGRAM(S): 5 TABLET ORAL at 11:51

## 2021-02-18 RX ADMIN — Medication 1 APPLICATION(S): at 05:03

## 2021-02-18 RX ADMIN — ALBUTEROL 2 PUFF(S): 90 AEROSOL, METERED ORAL at 16:22

## 2021-02-18 RX ADMIN — Medication 50 MILLIGRAM(S): at 17:32

## 2021-02-18 RX ADMIN — Medication 0.5 MILLIGRAM(S): at 11:31

## 2021-02-18 RX ADMIN — ENOXAPARIN SODIUM 100 MILLIGRAM(S): 100 INJECTION SUBCUTANEOUS at 05:03

## 2021-02-18 RX ADMIN — Medication 500 MILLIGRAM(S): at 11:30

## 2021-02-18 RX ADMIN — CHLORHEXIDINE GLUCONATE 1 APPLICATION(S): 213 SOLUTION TOPICAL at 11:31

## 2021-02-18 RX ADMIN — Medication 2: at 06:24

## 2021-02-18 RX ADMIN — Medication 1 TABLET(S): at 11:31

## 2021-02-18 RX ADMIN — NYSTATIN CREAM 1 APPLICATION(S): 100000 CREAM TOPICAL at 17:30

## 2021-02-18 RX ADMIN — Medication 100 GRAM(S): at 08:55

## 2021-02-18 RX ADMIN — ALBUTEROL 2 PUFF(S): 90 AEROSOL, METERED ORAL at 04:19

## 2021-02-18 RX ADMIN — AMLODIPINE BESYLATE 5 MILLIGRAM(S): 2.5 TABLET ORAL at 05:02

## 2021-02-18 RX ADMIN — ALBUTEROL 2 PUFF(S): 90 AEROSOL, METERED ORAL at 21:54

## 2021-02-18 RX ADMIN — Medication 20 MILLIEQUIVALENT(S): at 08:55

## 2021-02-18 RX ADMIN — Medication 50 MILLIGRAM(S): at 05:02

## 2021-02-18 RX ADMIN — Medication 1 TABLET(S): at 05:02

## 2021-02-18 RX ADMIN — Medication 1 TABLET(S): at 17:32

## 2021-02-18 RX ADMIN — Medication 250 MILLIGRAM(S): at 05:01

## 2021-02-18 RX ADMIN — CHOLESTYRAMINE 4 GRAM(S): 4 POWDER, FOR SUSPENSION ORAL at 08:55

## 2021-02-18 RX ADMIN — Medication 250 MILLIGRAM(S): at 11:31

## 2021-02-18 RX ADMIN — ZINC SULFATE TAB 220 MG (50 MG ZINC EQUIVALENT) 220 MILLIGRAM(S): 220 (50 ZN) TAB at 11:31

## 2021-02-18 RX ADMIN — ENOXAPARIN SODIUM 100 MILLIGRAM(S): 100 INJECTION SUBCUTANEOUS at 17:31

## 2021-02-18 RX ADMIN — Medication 250 MILLIGRAM(S): at 17:30

## 2021-02-18 RX ADMIN — Medication 1 APPLICATION(S): at 17:30

## 2021-02-18 RX ADMIN — ALBUTEROL 2 PUFF(S): 90 AEROSOL, METERED ORAL at 11:10

## 2021-02-18 RX ADMIN — NYSTATIN CREAM 1 APPLICATION(S): 100000 CREAM TOPICAL at 21:58

## 2021-02-18 NOTE — PROGRESS NOTE ADULT - ASSESSMENT
61 YO F with PMHx of HTN, DVT on Xarelto, Peritonitis s/p Oral's Procedure and Ileostomy (reversed in 2011) and AARON who presented to [facility] on 11/18 for AHRF second to COVID 19, intubated on 11/23 complicated by R lung abscesses on CT CHEST 12/5, multibacterial PNA and bacteremia, ARTEMIO s/p CRRT and HD, and s/p Tracheostomy 12/18 c/b track leak and s/p OR 8Bivona. Transferred to [facility] for furhter management.     # AMS/ Anxiety   - AOX3 at baseline.   - CTH and EEG WNL   - Now weaned off all sedation and mentation improved.   - Anxiety noted and Klonopin started. Supportive care.     # ARDS second to COVID vs Multi-bacterial PNA/ R Lung Abscess  - Intubated 11/23 and course complicated with multi-bacterial PNA and lung abscesses.   - Lung abscess presented on CT CHEST 12/5  - s/p Tracheostomy on 12/18 by CTSx. Sutures out 1/1/2021  - Air leak noted and s/p Trach Exchange for Bivona with CTSx 12/31  - Currently on FULL Vent QHS, tolerates PS well and will attempt TC as tolerated.   - Proventil, IPV and Chest PT Q6H and Suction PRN. Trach Care QD.     # Vasoplegic vs Septic Shock   - Weaned off pressors. Monitor BP on Norvasc and Metoprolol.   - On full AC for Hx of DVT. CTA CHEST with no PEs.     # ARTEMIO s/p CRRT and HD   - ARTEMIO now resolved and no longer required HD.   - Hypokalemia in setting of diarrhea/ CDIFF. Monitor electrolytes.   - Monitor renal function and avoid nephrotoxins.     # GI   - Initially CTSx and GI deferred PEG given Ventral Hernia   - s/p PEG placement with IR 1/11   - Continue on TF and monitor tolerance.   - Monitor diarrhea as CDIFF (+). Hold laxatives and continue on PO Vancomycin as below     # Sepsis second to COVID vs Multi-bacterial PNA/ Bacteremia and R Lung Abscess vs CDIFF (+)   - COVID with superimposed multi-bacterial VAP vs aspiration PNA vs cavitary PNA.   - SCx (11/24) MSSA and BCx (11/27) with MSSA and Proteus Bacteremia  - SCx (12/1, 12/12 and 12/27) with Stenotrophomonas.   - SCx (1/9) with  Pseudomonas and Stenotrophomonas   - BCX has been persistently negative from 12/1, and most recently 1/12   - CT CHEST 12/4 with right lung cavitation.   - s/p multiple antibiotics, but now completed Fortaz and Flagyl (1/12-1/16)  - RPT CT CHEST 1/28 with improvement of right lung abscess   - SCx with  Pseudomonas and Stenotrophomonas and s/p TED/ Fortaz (1/28-2/6)    # CDIFF (+)   - Started on PO Vancomycin (2/1 - 2/14)  - Questran daily to help with bulking stool   - ID following     # DM2   - Continue on ISS and NPH and monitor FS.   - Adjust insulin as needed.     # Hx of DVT   - Continue on Lovenox FULL DOSE for Xarelto for now.     # Skin/ Facial Wounds   - WOC recommendations appreciated   - Plastics evaluation and recommendations of facial wound appreciated     # Contracted Wrists   - PT/OT working on strength.     # DVT/ GI PPX with FULL AC/ PPI   # CODE STATUS - FULL CODE   # DISPO - PT recc Rehab/ Vent Facility  59 YO F with PMHx of HTN, DVT on Xarelto, Peritonitis s/p Oral's Procedure and Ileostomy (reversed in 2011) and AARON who presented to [facility] on 11/18 for AHRF second to COVID 19, intubated on 11/23 complicated by R lung abscesses on CT CHEST 12/5, multibacterial PNA and bacteremia, ARTEMIO s/p CRRT and HD, and s/p Tracheostomy 12/18 c/b track leak and s/p OR 8Bivona. Transferred to [facility] for furhter management.     # AMS/ Anxiety   - AOX3 at baseline.   - CTH and EEG WNL   - Now weaned off all sedation and mentation improved.   - Anxiety noted and Klonopin started. Supportive care.     # ARDS second to COVID vs Multi-bacterial PNA/ R Lung Abscess  - Intubated 11/23 and course complicated with multi-bacterial PNA and lung abscesses.   - Lung abscess presented on CT CHEST 12/5  - s/p Tracheostomy on 12/18 by CTSx. Sutures out 1/1/2021  - Air leak noted and s/p Trach Exchange for Bivona with CTSx 12/31  - Currently on FULL Vent QHS, tolerates PS well and will attempt TC as tolerated.   - Proventil, IPV and Chest PT Q6H and Suction PRN. Trach Care QD.     # Vasoplegic vs Septic Shock   - Weaned off pressors. Monitor BP on Norvasc and Metoprolol.   - On full AC for Hx of DVT. CTA CHEST with no PEs.     # ARTEMIO s/p CRRT and HD   - ARTEMIO now resolved and no longer required HD.   - Hypokalemia in setting of diarrhea/ CDIFF. Monitor electrolytes.   - Monitor renal function and avoid nephrotoxins.     # GI   - Initially CTSx and GI deferred PEG given Ventral Hernia   - s/p PEG placement with IR 1/11   - Continue on TF and monitor tolerance.   - Monitor diarrhea as CDIFF (+). Hold laxatives and continue on PO Vancomycin as below     # Sepsis second to COVID vs Multi-bacterial PNA/ Bacteremia and R Lung Abscess vs CDIFF (+)   - COVID with superimposed multi-bacterial VAP vs aspiration PNA vs cavitary PNA.   - SCx (11/24) MSSA and BCx (11/27) with MSSA and Proteus Bacteremia  - SCx (12/1, 12/12 and 12/27) with Stenotrophomonas.   - SCx (1/9) with  Pseudomonas and Stenotrophomonas   - BCX has been persistently negative from 12/1, and most recently 1/12   - CT CHEST 12/4 with right lung cavitation.   - s/p multiple antibiotics, but now completed Fortaz and Flagyl (1/12-1/16)  - RPT CT CHEST 1/28 with improvement of right lung abscess   - SCx with  Pseudomonas and Stenotrophomonas and s/p TED/ Fortaz (1/28-2/6)    # CDIFF (+)   - Started on PO Vancomycin 2/1, then increased to high dose 2/12  - Questran daily to help with bulking stool   - ID following     # DM2   - Continue on ISS and NPH and monitor FS.   - Adjust insulin as needed.     # Hx of DVT   - Continue on Lovenox FULL DOSE for Xarelto for now.     # Skin/ Facial Wounds   - WOC recommendations appreciated   - Plastics evaluation and recommendations of facial wound appreciated     # Contracted Wrists   - PT/OT working on strength.     # DVT/ GI PPX with FULL AC/ PPI   # CODE STATUS - FULL CODE   # DISPO - PT recc Rehab/ Vent Facility  61 YO F with PMHx of HTN, DVT on Xarelto, Peritonitis s/p Oral's Procedure and Ileostomy (reversed in 2011) and AARON who presented to [facility] on 11/18 for AHRF second to COVID 19, intubated on 11/23 complicated by R lung abscesses on CT CHEST 12/5, multibacterial PNA and bacteremia, ARTEMIO s/p CRRT and HD, and s/p Tracheostomy 12/18 c/b track leak and s/p OR 8Bivona. Transferred to [facility] for furhter management.     # AMS/ Anxiety   - AOX3 at baseline.   - CTH and EEG WNL   - Now weaned off all sedation and mentation improved.   - Anxiety noted and Klonopin started. Supportive care.     # ARDS second to COVID vs Multi-bacterial PNA/ R Lung Abscess  - Intubated 11/23 and course complicated with multi-bacterial PNA and lung abscesses.   - Lung abscess presented on CT CHEST 12/5  - s/p Tracheostomy on 12/18 by CTSx. Sutures out 1/1/2021  - Air leak noted and s/p Trach Exchange for Bivona with CTSx 12/31  - Tolerating TC QD and Vent QHS .   - Proventil, IPV and Chest PT Q6H and Suction PRN. Trach Care QD.     # Vasoplegic vs Septic Shock   - Weaned off pressors. Monitor BP on Norvasc and Metoprolol.   - On full AC for Hx of DVT. CTA CHEST with no PEs.     # ARTEMIO s/p CRRT and HD   - ARTEMIO now resolved and no longer required HD.   - Hypokalemia in setting of diarrhea/ CDIFF. Monitor electrolytes.   - Monitor renal function and avoid nephrotoxins.     # GI   - Initially CTSx and GI deferred PEG given Ventral Hernia   - s/p PEG placement with IR 1/11   - Continue on TF and monitor tolerance.   - Monitor diarrhea as CDIFF (+). Hold laxatives and continue on PO Vancomycin as below     # Sepsis second to COVID vs Multi-bacterial PNA/ Bacteremia and R Lung Abscess vs CDIFF (+)   - COVID with superimposed multi-bacterial VAP vs aspiration PNA vs cavitary PNA.   - SCx (11/24) MSSA and BCx (11/27) with MSSA and Proteus Bacteremia  - SCx (12/1, 12/12 and 12/27) with Stenotrophomonas.   - SCx (1/9) with  Pseudomonas and Stenotrophomonas   - BCX has been persistently negative from 12/1, and most recently 1/12   - CT CHEST 12/4 with right lung cavitation.   - s/p multiple antibiotics, but now completed Fortaz and Flagyl (1/12-1/16)  - RPT CT CHEST 1/28 with improvement of right lung abscess   - SCx with  Pseudomonas and Stenotrophomonas and s/p TED/ Fortaz (1/28-2/6)    # CDIFF (+)   - Started on PO Vancomycin 2/1, then increased to high dose 2/12  - Questran daily with improving in stools.   - ID following     # DM2   - Continue on ISS and NPH and monitor FS.   - Adjust insulin as needed.     # Hx of DVT   - Continue on Lovenox FULL DOSE for Xarelto for now.     # Skin/ Facial Wounds   - WOC recommendations appreciated   - Plastics evaluation and recommendations of facial wound appreciated     # Contracted Wrists   - PT/OT working on strength.     # DVT/ GI PPX with FULL AC/ PPI   # CODE STATUS - FULL CODE   # DISPO - PT recc Rehab/ Vent Facility

## 2021-02-18 NOTE — PROGRESS NOTE ADULT - SUBJECTIVE AND OBJECTIVE BOX
CHIEF COMPLAINT: Patient is a 60y old  Female who presents with a chief complaint of Transfer from [facility] (17 Feb 2021 07:33)    SUBJECTIVE: No interval events overnight. Diarrhea slowing down.     [ ] All other systems negative  [ ] Unable to assess ROS because ________    OBJECTIVE:  ICU Vital Signs Last 24 Hrs  T(C): 36.9 (18 Feb 2021 05:00), Max: 37.2 (17 Feb 2021 11:45)  T(F): 98.5 (18 Feb 2021 05:00), Max: 98.9 (17 Feb 2021 11:45)  HR: 101 (18 Feb 2021 05:00) (78 - 105)  BP: 153/65 (18 Feb 2021 05:00) (133/58 - 153/65)  BP(mean): --  ABP: --  ABP(mean): --  RR: 21 (18 Feb 2021 05:00) (21 - 25)  SpO2: 99% (18 Feb 2021 05:00) (97% - 100%)    Mode: AC/ CMV (Assist Control/ Continuous Mandatory Ventilation), RR (machine): 12, TV (machine): 450, FiO2: 25, PEEP: 5, MAP: 8.6, PIP: 17    02-17 @ 07:01  -  02-18 @ 07:00  --------------------------------------------------------  IN: 2680 mL / OUT: 800 mL / NET: 1880 mL    CAPILLARY BLOOD GLUCOSE  POCT Blood Glucose.: 176 mg/dL (18 Feb 2021 05:42)    PHYSICAL EXAM:  General: NAD, well groomed. (+) Obese.   HEENT: NC/ AT, PERRLA, Tracheostomy clean, dry and intact.   Cardio: RRR, S1/S2, no murmurs or rubs.   Pulm: Coarse vent sounds noted throughout, equal bilaterally.   GI: Soft, NDNT, BS (+). PEG (+) Rectal Tube with copious diarrhea (+)   MS: No pedal edema. AROMI.   Neuro: Awake and alert. Follow commands. No focal neurological deficits noted.   Skin: Warm and dry. No jaundice or cyanosis     HOSPITAL MEDICATIONS:  MEDICATIONS  (STANDING):  ALBUTerol    90 MICROgram(s) HFA Inhaler 2 Puff(s) Inhalation every 6 hours  amLODIPine   Tablet 5 milliGRAM(s) Oral daily  ascorbic acid 500 milliGRAM(s) Oral daily  chlorhexidine 4% Liquid 1 Application(s) Topical daily  cholestyramine Powder (Sugar-Free) 4 Gram(s) Oral daily  clonazePAM  Tablet 0.5 milliGRAM(s) Oral daily  collagenase Ointment 1 Application(s) Topical two times a day  dextrose 40% Gel 15 Gram(s) Oral once  dextrose 5%. 1000 milliLiter(s) (50 mL/Hr) IV Continuous <Continuous>  dextrose 5%. 1000 milliLiter(s) (100 mL/Hr) IV Continuous <Continuous>  dextrose 50% Injectable 25 Gram(s) IV Push once  dextrose 50% Injectable 12.5 Gram(s) IV Push once  dextrose 50% Injectable 25 Gram(s) IV Push once  enoxaparin Injectable 100 milliGRAM(s) SubCutaneous every 12 hours  glucagon  Injectable 1 milliGRAM(s) IntraMuscular once  insulin lispro (ADMELOG) corrective regimen sliding scale   SubCutaneous every 6 hours  lactobacillus acidophilus 1 Tablet(s) Oral two times a day  magnesium sulfate  IVPB 1 Gram(s) IV Intermittent once  metoprolol tartrate 50 milliGRAM(s) Oral two times a day  multivitamin 1 Tablet(s) Oral daily  pantoprazole  Injectable 40 milliGRAM(s) IV Push daily  potassium chloride   Powder 20 milliEquivalent(s) Oral once  vancomycin    Solution 250 milliGRAM(s) Oral every 6 hours  zinc sulfate 220 milliGRAM(s) Oral daily    MEDICATIONS  (PRN):  acetaminophen    Suspension .. 650 milliGRAM(s) Oral every 6 hours PRN Temp greater or equal to 38C (100.4F), Mild Pain (1 - 3), Moderate Pain (4 - 6)  artificial tears (preservative free) Ophthalmic Solution 1 Drop(s) Both EYES every 8 hours PRN Dry Eyes  oxyCODONE    Solution 5 milliGRAM(s) Oral every 4 hours PRN Severe Pain (7 - 10)    LABS:                        9.8    10.30 )-----------( 362      ( 18 Feb 2021 06:56 )             34.0     02-18    137  |  103  |  11  ----------------------------<  162<H>  3.5   |  21<L>  |  0.25<L>    Ca    9.7      18 Feb 2021 06:56  Phos  3.8     02-18  Mg     1.7     02-18     CHIEF COMPLAINT: Patient is a 60y old  Female who presents with a chief complaint of Transfer from [facility] (17 Feb 2021 07:33)    SUBJECTIVE: No interval events overnight. Diarrhea slowing down. Seen by bedside with no complaints and tolerating TC during the day with vent QHS. ROS limited second to poor phonation.     OBJECTIVE:  ICU Vital Signs Last 24 Hrs  T(C): 36.9 (18 Feb 2021 05:00), Max: 37.2 (17 Feb 2021 11:45)  T(F): 98.5 (18 Feb 2021 05:00), Max: 98.9 (17 Feb 2021 11:45)  HR: 101 (18 Feb 2021 05:00) (78 - 105)  BP: 153/65 (18 Feb 2021 05:00) (133/58 - 153/65)  BP(mean): --  ABP: --  ABP(mean): --  RR: 21 (18 Feb 2021 05:00) (21 - 25)  SpO2: 99% (18 Feb 2021 05:00) (97% - 100%)    Mode: AC/ CMV (Assist Control/ Continuous Mandatory Ventilation), RR (machine): 12, TV (machine): 450, FiO2: 25, PEEP: 5, MAP: 8.6, PIP: 17    02-17 @ 07:01  -  02-18 @ 07:00  --------------------------------------------------------  IN: 2680 mL / OUT: 800 mL / NET: 1880 mL    CAPILLARY BLOOD GLUCOSE  POCT Blood Glucose.: 176 mg/dL (18 Feb 2021 05:42)    PHYSICAL EXAM:  General: NAD, well groomed. (+) Obese.   HEENT: NC/ AT, PERRLA, Tracheostomy clean, dry and intact.   Cardio: RRR, S1/S2, no murmurs or rubs.   Pulm: Coarse vent sounds noted throughout, equal bilaterally.   GI: Soft, NDNT, BS (+). PEG (+) Rectal Tube with copious diarrhea (+)   MS: No pedal edema. AROMI.   Neuro: Awake and alert. Follow commands. No focal neurological deficits noted.   Skin: Warm and dry. No jaundice or cyanosis     HOSPITAL MEDICATIONS:  MEDICATIONS  (STANDING):  ALBUTerol    90 MICROgram(s) HFA Inhaler 2 Puff(s) Inhalation every 6 hours  amLODIPine   Tablet 5 milliGRAM(s) Oral daily  ascorbic acid 500 milliGRAM(s) Oral daily  chlorhexidine 4% Liquid 1 Application(s) Topical daily  cholestyramine Powder (Sugar-Free) 4 Gram(s) Oral daily  clonazePAM  Tablet 0.5 milliGRAM(s) Oral daily  collagenase Ointment 1 Application(s) Topical two times a day  dextrose 40% Gel 15 Gram(s) Oral once  dextrose 5%. 1000 milliLiter(s) (50 mL/Hr) IV Continuous <Continuous>  dextrose 5%. 1000 milliLiter(s) (100 mL/Hr) IV Continuous <Continuous>  dextrose 50% Injectable 25 Gram(s) IV Push once  dextrose 50% Injectable 12.5 Gram(s) IV Push once  dextrose 50% Injectable 25 Gram(s) IV Push once  enoxaparin Injectable 100 milliGRAM(s) SubCutaneous every 12 hours  glucagon  Injectable 1 milliGRAM(s) IntraMuscular once  insulin lispro (ADMELOG) corrective regimen sliding scale   SubCutaneous every 6 hours  lactobacillus acidophilus 1 Tablet(s) Oral two times a day  magnesium sulfate  IVPB 1 Gram(s) IV Intermittent once  metoprolol tartrate 50 milliGRAM(s) Oral two times a day  multivitamin 1 Tablet(s) Oral daily  pantoprazole  Injectable 40 milliGRAM(s) IV Push daily  potassium chloride   Powder 20 milliEquivalent(s) Oral once  vancomycin    Solution 250 milliGRAM(s) Oral every 6 hours  zinc sulfate 220 milliGRAM(s) Oral daily    MEDICATIONS  (PRN):  acetaminophen    Suspension .. 650 milliGRAM(s) Oral every 6 hours PRN Temp greater or equal to 38C (100.4F), Mild Pain (1 - 3), Moderate Pain (4 - 6)  artificial tears (preservative free) Ophthalmic Solution 1 Drop(s) Both EYES every 8 hours PRN Dry Eyes  oxyCODONE    Solution 5 milliGRAM(s) Oral every 4 hours PRN Severe Pain (7 - 10)    LABS:                        9.8    10.30 )-----------( 362      ( 18 Feb 2021 06:56 )             34.0     02-18    137  |  103  |  11  ----------------------------<  162<H>  3.5   |  21<L>  |  0.25<L>    Ca    9.7      18 Feb 2021 06:56  Phos  3.8     02-18  Mg     1.7     02-18

## 2021-02-18 NOTE — PROGRESS NOTE ADULT - NUTRITIONAL ASSESSMENT
This patient has been assessed with a concern for Malnutrition and has been determined to have a diagnosis/diagnoses of Severe protein-calorie malnutrition and Morbid obesity (BMI > 40).    This patient is being managed with:   Diet NPO with Tube Feed-  Tube Feeding Modality: Gastrostomy  Peptamen 1.5 ([id number])  Total Volume for 24 Hours (mL): 1080  Continuous  Until Goal Tube Feed Rate (mL per Hour): 45  Tube Feed Duration (in Hours): 24  Tube Feed Start Time: 12:00  No Carb Prosource (1pkg = 15gms Protein)     Qty per Day:  2  Entered: Feb 16 2021 11:49AM

## 2021-02-18 NOTE — PROGRESS NOTE ADULT - ATTENDING COMMENTS
61 YO F with PMHx of HTN, DVT on Xarelto, and AARON  p/w AHRF second to COVID 19,  complicated by R lung abscesses, ARTEMIO s/p CRRT and HD, and s/p Tracheostomy 12/18 c/b trach leak and s/p 8Bivona and now cdiff. Pt awake and alert and denies complaints. diarrhea  improving. cont vanco for cdiff. Cont to have significant weakness of all extremities caleb UE. Cont PT and OT. Doing well on TC.

## 2021-02-19 LAB
ANION GAP SERPL CALC-SCNC: 15 MMOL/L — HIGH (ref 7–14)
BUN SERPL-MCNC: 10 MG/DL — SIGNIFICANT CHANGE UP (ref 7–23)
CALCIUM SERPL-MCNC: 10 MG/DL — SIGNIFICANT CHANGE UP (ref 8.4–10.5)
CHLORIDE SERPL-SCNC: 102 MMOL/L — SIGNIFICANT CHANGE UP (ref 98–107)
CO2 SERPL-SCNC: 21 MMOL/L — LOW (ref 22–31)
CREAT SERPL-MCNC: 0.24 MG/DL — LOW (ref 0.5–1.3)
GLUCOSE BLDC GLUCOMTR-MCNC: 112 MG/DL — HIGH (ref 70–99)
GLUCOSE BLDC GLUCOMTR-MCNC: 133 MG/DL — HIGH (ref 70–99)
GLUCOSE BLDC GLUCOMTR-MCNC: 139 MG/DL — HIGH (ref 70–99)
GLUCOSE BLDC GLUCOMTR-MCNC: 161 MG/DL — HIGH (ref 70–99)
GLUCOSE SERPL-MCNC: 130 MG/DL — HIGH (ref 70–99)
MAGNESIUM SERPL-MCNC: 1.8 MG/DL — SIGNIFICANT CHANGE UP (ref 1.6–2.6)
PHOSPHATE SERPL-MCNC: 3.6 MG/DL — SIGNIFICANT CHANGE UP (ref 2.5–4.5)
POTASSIUM SERPL-MCNC: 3.8 MMOL/L — SIGNIFICANT CHANGE UP (ref 3.5–5.3)
POTASSIUM SERPL-SCNC: 3.8 MMOL/L — SIGNIFICANT CHANGE UP (ref 3.5–5.3)
SODIUM SERPL-SCNC: 138 MMOL/L — SIGNIFICANT CHANGE UP (ref 135–145)

## 2021-02-19 PROCEDURE — 99233 SBSQ HOSP IP/OBS HIGH 50: CPT

## 2021-02-19 RX ORDER — RIVAROXABAN 15 MG-20MG
20 KIT ORAL
Refills: 0 | Status: DISCONTINUED | OUTPATIENT
Start: 2021-02-19 | End: 2021-03-19

## 2021-02-19 RX ORDER — VANCOMYCIN HCL 1 G
250 VIAL (EA) INTRAVENOUS EVERY 6 HOURS
Refills: 0 | Status: DISCONTINUED | OUTPATIENT
Start: 2021-02-19 | End: 2021-02-22

## 2021-02-19 RX ADMIN — AMLODIPINE BESYLATE 5 MILLIGRAM(S): 2.5 TABLET ORAL at 05:03

## 2021-02-19 RX ADMIN — Medication 250 MILLIGRAM(S): at 05:03

## 2021-02-19 RX ADMIN — Medication 250 MILLIGRAM(S): at 23:42

## 2021-02-19 RX ADMIN — Medication 500 MILLIGRAM(S): at 11:47

## 2021-02-19 RX ADMIN — ALBUTEROL 2 PUFF(S): 90 AEROSOL, METERED ORAL at 15:44

## 2021-02-19 RX ADMIN — NYSTATIN CREAM 1 APPLICATION(S): 100000 CREAM TOPICAL at 15:00

## 2021-02-19 RX ADMIN — CHLORHEXIDINE GLUCONATE 1 APPLICATION(S): 213 SOLUTION TOPICAL at 11:45

## 2021-02-19 RX ADMIN — NYSTATIN CREAM 1 APPLICATION(S): 100000 CREAM TOPICAL at 21:10

## 2021-02-19 RX ADMIN — PANTOPRAZOLE SODIUM 40 MILLIGRAM(S): 20 TABLET, DELAYED RELEASE ORAL at 11:47

## 2021-02-19 RX ADMIN — Medication 2: at 11:53

## 2021-02-19 RX ADMIN — Medication 250 MILLIGRAM(S): at 00:44

## 2021-02-19 RX ADMIN — OXYCODONE HYDROCHLORIDE 5 MILLIGRAM(S): 5 TABLET ORAL at 19:54

## 2021-02-19 RX ADMIN — ALBUTEROL 2 PUFF(S): 90 AEROSOL, METERED ORAL at 22:35

## 2021-02-19 RX ADMIN — Medication 250 MILLIGRAM(S): at 11:46

## 2021-02-19 RX ADMIN — ZINC SULFATE TAB 220 MG (50 MG ZINC EQUIVALENT) 220 MILLIGRAM(S): 220 (50 ZN) TAB at 11:47

## 2021-02-19 RX ADMIN — ALBUTEROL 2 PUFF(S): 90 AEROSOL, METERED ORAL at 05:05

## 2021-02-19 RX ADMIN — NYSTATIN CREAM 1 APPLICATION(S): 100000 CREAM TOPICAL at 05:04

## 2021-02-19 RX ADMIN — Medication 1 TABLET(S): at 05:04

## 2021-02-19 RX ADMIN — CHOLESTYRAMINE 4 GRAM(S): 4 POWDER, FOR SUSPENSION ORAL at 07:43

## 2021-02-19 RX ADMIN — Medication 1 TABLET(S): at 16:28

## 2021-02-19 RX ADMIN — Medication 50 MILLIGRAM(S): at 05:03

## 2021-02-19 RX ADMIN — ENOXAPARIN SODIUM 100 MILLIGRAM(S): 100 INJECTION SUBCUTANEOUS at 05:04

## 2021-02-19 RX ADMIN — ALBUTEROL 2 PUFF(S): 90 AEROSOL, METERED ORAL at 10:54

## 2021-02-19 RX ADMIN — Medication 250 MILLIGRAM(S): at 18:02

## 2021-02-19 RX ADMIN — Medication 1 TABLET(S): at 11:46

## 2021-02-19 RX ADMIN — Medication 1 APPLICATION(S): at 16:27

## 2021-02-19 RX ADMIN — RIVAROXABAN 20 MILLIGRAM(S): KIT at 16:28

## 2021-02-19 RX ADMIN — Medication 1 APPLICATION(S): at 05:04

## 2021-02-19 RX ADMIN — Medication 0.5 MILLIGRAM(S): at 11:45

## 2021-02-19 RX ADMIN — Medication 50 MILLIGRAM(S): at 16:28

## 2021-02-19 NOTE — PROGRESS NOTE ADULT - SUBJECTIVE AND OBJECTIVE BOX
CHIEF COMPLAINT: Patient is a 60y old  Female who presents with a chief complaint of Transfer from [facility] (18 Feb 2021 08:19)    SUBJECTIVE: No interval events overnight. Stool improving.     [ ] All other systems negative  [ ] Unable to assess ROS because ________    OBJECTIVE:  ICU Vital Signs Last 24 Hrs  T(C): 36.7 (19 Feb 2021 05:01), Max: 36.7 (19 Feb 2021 05:01)  T(F): 98 (19 Feb 2021 05:01), Max: 98 (19 Feb 2021 05:01)  HR: 93 (19 Feb 2021 07:22) (85 - 97)  BP: 155/60 (19 Feb 2021 05:01) (139/60 - 155/60)  BP(mean): --  ABP: --  ABP(mean): --  RR: 23 (19 Feb 2021 05:01) (21 - 23)  SpO2: 99% (19 Feb 2021 07:22) (99% - 100%)    Mode: AC/ CMV (Assist Control/ Continuous Mandatory Ventilation), RR (machine): 12, TV (machine): 450, FiO2: 25, PEEP: 5, MAP: 9, PIP: 17    02-18 @ 07:01  -  02-19 @ 07:00  --------------------------------------------------------  IN: 540 mL / OUT: 50 mL / NET: 490 mL    CAPILLARY BLOOD GLUCOSE  POCT Blood Glucose.: 139 mg/dL (19 Feb 2021 05:00)    PHYSICAL EXAM:  General: NAD, well groomed. (+) Obese.   HEENT: NC/ AT, PERRLA, Tracheostomy clean, dry and intact.   Cardio: RRR, S1/S2, no murmurs or rubs.   Pulm: Coarse vent sounds noted throughout, equal bilaterally.   GI: Soft, NDNT, BS (+). PEG (+) Rectal Tube with copious diarrhea (+)   MS: No pedal edema. AROMI.   Neuro: Awake and alert. Follow commands. No focal neurological deficits noted.   Skin: Warm and dry. No jaundice or cyanosis    HOSPITAL MEDICATIONS:  MEDICATIONS  (STANDING):  ALBUTerol    90 MICROgram(s) HFA Inhaler 2 Puff(s) Inhalation every 6 hours  amLODIPine   Tablet 5 milliGRAM(s) Oral daily  ascorbic acid 500 milliGRAM(s) Oral daily  chlorhexidine 4% Liquid 1 Application(s) Topical daily  clonazePAM  Tablet 0.5 milliGRAM(s) Oral daily  collagenase Ointment 1 Application(s) Topical two times a day  dextrose 40% Gel 15 Gram(s) Oral once  dextrose 5%. 1000 milliLiter(s) (50 mL/Hr) IV Continuous <Continuous>  dextrose 5%. 1000 milliLiter(s) (100 mL/Hr) IV Continuous <Continuous>  dextrose 50% Injectable 25 Gram(s) IV Push once  dextrose 50% Injectable 12.5 Gram(s) IV Push once  dextrose 50% Injectable 25 Gram(s) IV Push once  enoxaparin Injectable 100 milliGRAM(s) SubCutaneous every 12 hours  glucagon  Injectable 1 milliGRAM(s) IntraMuscular once  insulin lispro (ADMELOG) corrective regimen sliding scale   SubCutaneous every 6 hours  lactobacillus acidophilus 1 Tablet(s) Oral two times a day  metoprolol tartrate 50 milliGRAM(s) Oral two times a day  multivitamin 1 Tablet(s) Oral daily  nystatin Powder 1 Application(s) Topical every 8 hours  pantoprazole  Injectable 40 milliGRAM(s) IV Push daily  vancomycin    Solution 250 milliGRAM(s) Oral every 6 hours  zinc sulfate 220 milliGRAM(s) Oral daily    MEDICATIONS  (PRN):  acetaminophen    Suspension .. 650 milliGRAM(s) Oral every 6 hours PRN Temp greater or equal to 38C (100.4F), Mild Pain (1 - 3), Moderate Pain (4 - 6)  artificial tears (preservative free) Ophthalmic Solution 1 Drop(s) Both EYES every 8 hours PRN Dry Eyes  oxyCODONE    Solution 5 milliGRAM(s) Oral every 4 hours PRN Severe Pain (7 - 10)    LABS:                        9.8    10.30 )-----------( 362      ( 18 Feb 2021 06:56 )             34.0     02-19    138  |  102  |  10  ----------------------------<  130<H>  3.8   |  21<L>  |  0.24<L>    Ca    10.0      19 Feb 2021 06:58  Phos  3.6     02-19  Mg     1.8     02-19 CHIEF COMPLAINT: Patient is a 60y old  Female who presents with a chief complaint of Transfer from [facility] (18 Feb 2021 08:19)    SUBJECTIVE: No interval events overnight. Stool improving. Seen by bedside on TC and PMV trial with tolerance. Notes no complaints of pain and reports improvement with PT. Denies CP, SOB or abdominal pain. ROS otherwise negative.     OBJECTIVE:  ICU Vital Signs Last 24 Hrs  T(C): 36.7 (19 Feb 2021 05:01), Max: 36.7 (19 Feb 2021 05:01)  T(F): 98 (19 Feb 2021 05:01), Max: 98 (19 Feb 2021 05:01)  HR: 93 (19 Feb 2021 07:22) (85 - 97)  BP: 155/60 (19 Feb 2021 05:01) (139/60 - 155/60)  BP(mean): --  ABP: --  ABP(mean): --  RR: 23 (19 Feb 2021 05:01) (21 - 23)  SpO2: 99% (19 Feb 2021 07:22) (99% - 100%)    Mode: AC/ CMV (Assist Control/ Continuous Mandatory Ventilation), RR (machine): 12, TV (machine): 450, FiO2: 25, PEEP: 5, MAP: 9, PIP: 17    02-18 @ 07:01  -  02-19 @ 07:00  --------------------------------------------------------  IN: 540 mL / OUT: 50 mL / NET: 490 mL    CAPILLARY BLOOD GLUCOSE  POCT Blood Glucose.: 139 mg/dL (19 Feb 2021 05:00)    PHYSICAL EXAM:  General: NAD, well groomed. (+) Obese.   HEENT: NC/ AT, PERRLA, Tracheostomy clean, dry and intact.   Cardio: RRR, S1/S2, no murmurs or rubs.   Pulm: CTA with mild upper airway congestion, cleared on suctioning   GI: Soft, NDNT, BS (+). PEG (+) Rectal Tube with improving diarrhea (+)   MS: No pedal edema. AROMI.   Neuro: Awake and alert. Follow commands. No focal neurological deficits noted.   Skin: Warm and dry. No jaundice or cyanosis    HOSPITAL MEDICATIONS:  MEDICATIONS  (STANDING):  ALBUTerol    90 MICROgram(s) HFA Inhaler 2 Puff(s) Inhalation every 6 hours  amLODIPine   Tablet 5 milliGRAM(s) Oral daily  ascorbic acid 500 milliGRAM(s) Oral daily  chlorhexidine 4% Liquid 1 Application(s) Topical daily  clonazePAM  Tablet 0.5 milliGRAM(s) Oral daily  collagenase Ointment 1 Application(s) Topical two times a day  dextrose 40% Gel 15 Gram(s) Oral once  dextrose 5%. 1000 milliLiter(s) (50 mL/Hr) IV Continuous <Continuous>  dextrose 5%. 1000 milliLiter(s) (100 mL/Hr) IV Continuous <Continuous>  dextrose 50% Injectable 25 Gram(s) IV Push once  dextrose 50% Injectable 12.5 Gram(s) IV Push once  dextrose 50% Injectable 25 Gram(s) IV Push once  enoxaparin Injectable 100 milliGRAM(s) SubCutaneous every 12 hours  glucagon  Injectable 1 milliGRAM(s) IntraMuscular once  insulin lispro (ADMELOG) corrective regimen sliding scale   SubCutaneous every 6 hours  lactobacillus acidophilus 1 Tablet(s) Oral two times a day  metoprolol tartrate 50 milliGRAM(s) Oral two times a day  multivitamin 1 Tablet(s) Oral daily  nystatin Powder 1 Application(s) Topical every 8 hours  pantoprazole  Injectable 40 milliGRAM(s) IV Push daily  vancomycin    Solution 250 milliGRAM(s) Oral every 6 hours  zinc sulfate 220 milliGRAM(s) Oral daily    MEDICATIONS  (PRN):  acetaminophen    Suspension .. 650 milliGRAM(s) Oral every 6 hours PRN Temp greater or equal to 38C (100.4F), Mild Pain (1 - 3), Moderate Pain (4 - 6)  artificial tears (preservative free) Ophthalmic Solution 1 Drop(s) Both EYES every 8 hours PRN Dry Eyes  oxyCODONE    Solution 5 milliGRAM(s) Oral every 4 hours PRN Severe Pain (7 - 10)    LABS:                        9.8    10.30 )-----------( 362      ( 18 Feb 2021 06:56 )             34.0     02-19    138  |  102  |  10  ----------------------------<  130<H>  3.8   |  21<L>  |  0.24<L>    Ca    10.0      19 Feb 2021 06:58  Phos  3.6     02-19  Mg     1.8     02-19

## 2021-02-19 NOTE — PROGRESS NOTE ADULT - ATTENDING COMMENTS
59 YO F with PMHx of HTN, DVT on Xarelto, and AARON  p/w AHRF second to COVID 19,  complicated by R lung abscesses, ARTEMIO s/p CRRT and HD, and s/p Tracheostomy 12/18 c/b trach leak and s/p 8Bivona and now cdiff. Pt awake and alert and denies complaints. diarrhea  resolved. cont vanco for cdiff. Cont to have significant weakness of all extremities caleb UE. Cont PT and OT. Doing well on TC. Discharge planning

## 2021-02-19 NOTE — PROGRESS NOTE ADULT - ASSESSMENT
61 YO F with PMHx of HTN, DVT on Xarelto, Peritonitis s/p Oral's Procedure and Ileostomy (reversed in 2011) and AARON who presented to [facility] on 11/18 for AHRF second to COVID 19, intubated on 11/23 complicated by R lung abscesses on CT CHEST 12/5, multibacterial PNA and bacteremia, ARTEMIO s/p CRRT and HD, and s/p Tracheostomy 12/18 c/b track leak and s/p OR 8Bivona. Transferred to [facility] for furhter management.     # AMS/ Anxiety   - AOX3 at baseline.   - CTH and EEG WNL   - Now weaned off all sedation and mentation improved.   - Anxiety noted and Klonopin started. Supportive care.     # ARDS second to COVID vs Multi-bacterial PNA/ R Lung Abscess  - Intubated 11/23 and course complicated with multi-bacterial PNA and lung abscesses.   - Lung abscess presented on CT CHEST 12/5  - s/p Tracheostomy on 12/18 by CTSx. Sutures out 1/1/2021  - Air leak noted and s/p Trach Exchange for Bivona with CTSx 12/31  - Tolerating TC QD and Vent QHS .   - Proventil, IPV and Chest PT Q6H and Suction PRN. Trach Care QD.     # Vasoplegic vs Septic Shock   - Weaned off pressors. Monitor BP on Norvasc and Metoprolol.   - On full AC for Hx of DVT. CTA CHEST with no PEs.     # ARTEMIO s/p CRRT and HD   - ARTEMIO now resolved and no longer required HD.   - Hypokalemia in setting of diarrhea/ CDIFF. Monitor electrolytes.   - Monitor renal function and avoid nephrotoxins.     # GI   - Initially CTSx and GI deferred PEG given Ventral Hernia   - s/p PEG placement with IR 1/11   - Continue on TF and monitor tolerance.   - Monitor diarrhea as CDIFF (+). Hold laxatives and continue on PO Vancomycin as below     # Sepsis second to COVID vs Multi-bacterial PNA/ Bacteremia and R Lung Abscess vs CDIFF (+)   - COVID with superimposed multi-bacterial VAP vs aspiration PNA vs cavitary PNA.   - SCx (11/24) MSSA and BCx (11/27) with MSSA and Proteus Bacteremia  - SCx (12/1, 12/12 and 12/27) with Stenotrophomonas.   - SCx (1/9) with  Pseudomonas and Stenotrophomonas   - BCX has been persistently negative from 12/1, and most recently 1/12   - CT CHEST 12/4 with right lung cavitation.   - s/p multiple antibiotics, but now completed Fortaz and Flagyl (1/12-1/16)  - RPT CT CHEST 1/28 with improvement of right lung abscess   - SCx with  Pseudomonas and Stenotrophomonas and s/p TED/ Fortaz (1/28-2/6)    # CDIFF (+)   - Started on PO Vancomycin 2/1, then increased to high dose 2/12  - Questran daily with improving in stools.   - ID following     # DM2   - Continue on ISS and NPH and monitor FS.   - Adjust insulin as needed.     # Hx of DVT   - Continue on Lovenox FULL DOSE for Xarelto for now.     # Skin/ Facial Wounds   - WOC recommendations appreciated   - Plastics evaluation and recommendations of facial wound appreciated     # Contracted Wrists   - PT/OT working on strength.     # DVT/ GI PPX with FULL AC/ PPI   # CODE STATUS - FULL CODE   # DISPO - PT recc Rehab/ Vent Facility  Statement Selected 61 YO F with PMHx of HTN, DVT on Xarelto, Peritonitis s/p Oral's Procedure and Ileostomy (reversed in 2011) and AARON who presented to [facility] on 11/18 for AHRF second to COVID 19, intubated on 11/23 complicated by R lung abscesses on CT CHEST 12/5, multibacterial PNA and bacteremia, ARTEMIO s/p CRRT and HD, and s/p Tracheostomy 12/18 c/b track leak and s/p OR 8Bivona. Transferred to [facility] for furhter management.     # AMS/ Anxiety   - AOX3 at baseline.   - CTH and EEG WNL   - Now weaned off all sedation and mentation improved.   - Anxiety noted and Klonopin started. Supportive care.     # ARDS second to COVID vs Multi-bacterial PNA/ R Lung Abscess  - Intubated 11/23 and course complicated with multi-bacterial PNA and lung abscesses.   - s/p Tracheostomy on 12/18 by CTSx. Sutures out 1/1  - Air leak noted and s/p Trach Exchange for Bivona with CTSx 12/31  - Tolerating TC QD and Vent QHS .   - Proventil, IPV and Chest PT Q6H and Suction PRN. Trach Care QD.     # Vasoplegic vs Septic Shock   - Weaned off pressors. Monitor BP on Norvasc and Metoprolol.   - On full AC for Hx of DVT. CTA CHEST with no PEs.     # ARTEMIO s/p CRRT and HD   - ARTEMIO now resolved and no longer required HD.   - Hypokalemia in setting of diarrhea/ CDIFF. Monitor electrolytes.   - Monitor renal function and avoid nephrotoxins.     # GI   - Initially CTSx and GI deferred PEG given Ventral Hernia   - s/p PEG placement with IR 1/11   - Continue on TF and monitor tolerance.   - Monitor diarrhea as CDIFF (+). Hold laxatives and continue on PO Vancomycin as below     # Sepsis second to COVID vs Multi-bacterial PNA/ Bacteremia and R Lung Abscess vs CDIFF (+)   - COVID with superimposed multi-bacterial VAP vs aspiration PNA vs cavitary PNA.   - SCx (11/24) MSSA and BCx (11/27) with MSSA and Proteus Bacteremia  - SCx (12/1, 12/12 and 12/27) with Stenotrophomonas.   - SCx (1/9) with  Pseudomonas and Stenotrophomonas   - BCX has been persistently negative from 12/1, and most recently 1/12   - CT CHEST 12/4 with right lung cavitation.   - s/p multiple antibiotics, but now completed Fortaz and Flagyl (1/12-1/16)  - RPT CT CHEST 1/28 with improvement of right lung abscess   - SCx with  Pseudomonas and Stenotrophomonas and s/p TED/ Fortaz (1/28-2/6)    # CDIFF (+)   - Started on PO Vancomycin 2/1, then increased to high dose 2/12-2/19  - Questran daily with improved stools.   - ID following     # DM2   - Continue on ISS and NPH and monitor FS.   - Adjust insulin as needed.     # Hx of DVT   - Continue on Lovenox FULL DOSE for Xarelto for now.     # Skin/ Facial Wounds   - WOC recommendations appreciated   - Plastics evaluation appreciated    # Contracted Wrists   - PT/OT working on strength.     # DVT/ GI PPX with FULL AC/ PPI   # CODE STATUS - FULL CODE   # DISPO - PT recc Rehab/ Vent Facility  61 YO F with PMHx of HTN, DVT on Xarelto, Peritonitis s/p Oral's Procedure and Ileostomy (reversed in 2011) and AARON who presented to [facility] on 11/18 for AHRF second to COVID 19, intubated on 11/23 complicated by R lung abscesses on CT CHEST 12/5, multibacterial PNA and bacteremia, ARTEMIO s/p CRRT and HD, and s/p Tracheostomy 12/18 c/b track leak and s/p OR 8Bivona. Transferred to [facility] for furhter management.     # AMS/ Anxiety   - AOX3 at baseline.   - CTH and EEG WNL   - Now weaned off all sedation and mentation improved.   - Anxiety noted and Klonopin started. Supportive care.     # ARDS second to COVID vs Multi-bacterial PNA/ R Lung Abscess  - Intubated 11/23 and course complicated with multi-bacterial PNA and lung abscesses.   - s/p Tracheostomy on 12/18 by CTSx. Sutures out 1/1  - Air leak noted and s/p Trach Exchange for Bivona with CTSx 12/31  - Tolerating TC with PMV QD and Vent QHS .   - Proventil, IPV and Chest PT Q6H and Suction PRN. Trach Care QD.     # Vasoplegic vs Septic Shock   - Weaned off pressors. Monitor BP on Norvasc and Metoprolol.   - On full AC for Hx of DVT. CTA CHEST with no PEs.     # ARTEMIO s/p CRRT and HD   - ARTEMIO now resolved and no longer required HD.   - Hypokalemia in setting of diarrhea/ CDIFF. Monitor electrolytes.   - Monitor renal function and avoid nephrotoxins.     # GI   - Initially CTSx and GI deferred PEG given Ventral Hernia   - s/p PEG placement with IR 1/11   - Continue on TF and monitor tolerance.   - Monitor diarrhea as CDIFF (+). Hold laxatives and continue on PO Vancomycin as below     # Sepsis second to COVID vs Multi-bacterial PNA/ Bacteremia and R Lung Abscess vs CDIFF (+)   - COVID with superimposed multi-bacterial VAP vs aspiration PNA vs cavitary PNA.   - SCx (11/24) MSSA and BCx (11/27) with MSSA and Proteus Bacteremia  - SCx (12/1, 12/12 and 12/27) with Stenotrophomonas.   - SCx (1/9) with  Pseudomonas and Stenotrophomonas   - BCX has been persistently negative from 12/1, and most recently 1/12   - CT CHEST 12/4 with right lung cavitation.   - s/p multiple antibiotics, but now completed Fortaz and Flagyl (1/12-1/16)  - RPT CT CHEST 1/28 with improvement of right lung abscess   - SCx with  Pseudomonas and Stenotrophomonas and s/p TED/ Fortaz (1/28-2/6)    # CDIFF (+)   - Started on PO Vancomycin 2/1, then increased to high dose 2/12-2/19  - Questran daily with improved stools.   - ID following     # DM2   - Continue on ISS and NPH and monitor FS.   - Adjust insulin as needed.     # Hx of DVT   - Continue on Xarelto.     # Skin/ Facial Wounds   - WOC recommendations appreciated   - Plastics evaluation appreciated    # Contracted Wrists   - PT/OT working on strength.     # DVT/ GI PPX with FULL AC/ PPI   # CODE STATUS - FULL CODE   # DISPO - PT recc Rehab/ Vent Facility

## 2021-02-20 LAB
GLUCOSE BLDC GLUCOMTR-MCNC: 136 MG/DL — HIGH (ref 70–99)
GLUCOSE BLDC GLUCOMTR-MCNC: 141 MG/DL — HIGH (ref 70–99)
GLUCOSE BLDC GLUCOMTR-MCNC: 143 MG/DL — HIGH (ref 70–99)
GLUCOSE BLDC GLUCOMTR-MCNC: 149 MG/DL — HIGH (ref 70–99)

## 2021-02-20 PROCEDURE — 99233 SBSQ HOSP IP/OBS HIGH 50: CPT | Mod: GC

## 2021-02-20 RX ADMIN — NYSTATIN CREAM 1 APPLICATION(S): 100000 CREAM TOPICAL at 21:58

## 2021-02-20 RX ADMIN — Medication 1 TABLET(S): at 12:26

## 2021-02-20 RX ADMIN — Medication 1 TABLET(S): at 05:48

## 2021-02-20 RX ADMIN — ALBUTEROL 2 PUFF(S): 90 AEROSOL, METERED ORAL at 04:13

## 2021-02-20 RX ADMIN — ALBUTEROL 2 PUFF(S): 90 AEROSOL, METERED ORAL at 12:48

## 2021-02-20 RX ADMIN — Medication 50 MILLIGRAM(S): at 05:48

## 2021-02-20 RX ADMIN — Medication 1 TABLET(S): at 17:46

## 2021-02-20 RX ADMIN — Medication 250 MILLIGRAM(S): at 23:51

## 2021-02-20 RX ADMIN — AMLODIPINE BESYLATE 5 MILLIGRAM(S): 2.5 TABLET ORAL at 05:48

## 2021-02-20 RX ADMIN — ALBUTEROL 2 PUFF(S): 90 AEROSOL, METERED ORAL at 17:02

## 2021-02-20 RX ADMIN — CHLORHEXIDINE GLUCONATE 1 APPLICATION(S): 213 SOLUTION TOPICAL at 12:26

## 2021-02-20 RX ADMIN — ALBUTEROL 2 PUFF(S): 90 AEROSOL, METERED ORAL at 22:30

## 2021-02-20 RX ADMIN — Medication 1 APPLICATION(S): at 17:45

## 2021-02-20 RX ADMIN — Medication 0.5 MILLIGRAM(S): at 12:25

## 2021-02-20 RX ADMIN — Medication 500 MILLIGRAM(S): at 12:27

## 2021-02-20 RX ADMIN — NYSTATIN CREAM 1 APPLICATION(S): 100000 CREAM TOPICAL at 05:49

## 2021-02-20 RX ADMIN — Medication 1 APPLICATION(S): at 05:49

## 2021-02-20 RX ADMIN — Medication 250 MILLIGRAM(S): at 05:48

## 2021-02-20 RX ADMIN — ZINC SULFATE TAB 220 MG (50 MG ZINC EQUIVALENT) 220 MILLIGRAM(S): 220 (50 ZN) TAB at 12:27

## 2021-02-20 RX ADMIN — NYSTATIN CREAM 1 APPLICATION(S): 100000 CREAM TOPICAL at 12:40

## 2021-02-20 RX ADMIN — Medication 250 MILLIGRAM(S): at 12:26

## 2021-02-20 RX ADMIN — PANTOPRAZOLE SODIUM 40 MILLIGRAM(S): 20 TABLET, DELAYED RELEASE ORAL at 12:27

## 2021-02-20 RX ADMIN — Medication 50 MILLIGRAM(S): at 17:46

## 2021-02-20 RX ADMIN — OXYCODONE HYDROCHLORIDE 5 MILLIGRAM(S): 5 TABLET ORAL at 20:04

## 2021-02-20 RX ADMIN — Medication 250 MILLIGRAM(S): at 17:45

## 2021-02-20 RX ADMIN — RIVAROXABAN 20 MILLIGRAM(S): KIT at 17:45

## 2021-02-20 NOTE — PROGRESS NOTE ADULT - ASSESSMENT
61 YO F with PMHx of HTN, DVT on Xarelto, Peritonitis s/p Oral's Procedure and Ileostomy (reversed in 2011) and AARON who presented to [facility] on 11/18 for AHRF second to COVID 19, intubated on 11/23 complicated by R lung abscesses on CT CHEST 12/5, multibacterial PNA and bacteremia, ARTEMIO s/p CRRT and HD, and s/p Tracheostomy 12/18 c/b track leak and s/p OR 8Bivona. Transferred to [facility] for furhter management.     # AMS/ Anxiety   - AOX3 at baseline.   - CTH and EEG WNL   - Now weaned off all sedation and mentation improved.   - Anxiety noted and Klonopin started. Supportive care.     # ARDS second to COVID vs Multi-bacterial PNA/ R Lung Abscess  - Intubated 11/23 and course complicated with multi-bacterial PNA and lung abscesses.   - s/p Tracheostomy on 12/18 by CTSx. Sutures out 1/1  - Air leak noted and s/p Trach Exchange for Bivona with CTSx 12/31  - Tolerating TC with PMV QD and Vent QHS .   - Proventil, IPV and Chest PT Q6H and Suction PRN. Trach Care QD.     # Vasoplegic vs Septic Shock   - Weaned off pressors. Monitor BP on Norvasc and Metoprolol.   - On full AC for Hx of DVT. CTA CHEST with no PEs.     # ARTEMIO s/p CRRT and HD   - ARTEMIO now resolved and no longer required HD.   - Hypokalemia in setting of diarrhea/ CDIFF. Monitor electrolytes.   - Monitor renal function and avoid nephrotoxins.     # GI   - Initially CTSx and GI deferred PEG given Ventral Hernia   - s/p PEG placement with IR 1/11   - Continue on TF and monitor tolerance.   - Monitor diarrhea as CDIFF (+). Hold laxatives and continue on PO Vancomycin as below     # Sepsis second to COVID vs Multi-bacterial PNA/ Bacteremia and R Lung Abscess vs CDIFF (+)   - COVID with superimposed multi-bacterial VAP vs aspiration PNA vs cavitary PNA.   - SCx (11/24) MSSA and BCx (11/27) with MSSA and Proteus Bacteremia  - SCx (12/1, 12/12 and 12/27) with Stenotrophomonas.   - SCx (1/9) with  Pseudomonas and Stenotrophomonas   - BCX has been persistently negative from 12/1, and most recently 1/12   - CT CHEST 12/4 with right lung cavitation.   - s/p multiple antibiotics, but now completed Fortaz and Flagyl (1/12-1/16)  - RPT CT CHEST 1/28 with improvement of right lung abscess   - SCx with  Pseudomonas and Stenotrophomonas and s/p TED/ Fortaz (1/28-2/6)    # CDIFF (+)   - Started on PO Vancomycin 2/1, then increased to high dose 2/12-2/19  - Questran daily with improved stools.   - ID following     # DM2   - Continue on ISS and NPH and monitor FS.   - Adjust insulin as needed.     # Hx of DVT   - Continue on Xarelto.     # Skin/ Facial Wounds   - WOC recommendations appreciated   - Plastics evaluation appreciated    # Contracted Wrists   - PT/OT working on strength.     # DVT/ GI PPX with FULL AC/ PPI   # CODE STATUS - FULL CODE   # DISPO - PT recc Rehab/ Vent Facility

## 2021-02-20 NOTE — PROGRESS NOTE ADULT - MENTAL STATUS
Awake alert answers questions . follows commands
awake alert follows simple commands
Awake alert HUANG strength diminished
Awake alert , following commands
Awake alert following commands
Awake alert follows commands   able to make needs known
Opens eyes randomly , does not follow commands
Awake alert /able to speak with trach /form words
Eyes open ,  tracking , verbalizing
Awake alert able to make needs know /HUANG
Awake alert following commands /co-operative
Awake alert
Awake alert , conversing /forming words
awake alert
Awake alert   follows commands
Eyes open - tracking /following simple commands- blink /move fingers
Eyes open awake /communicating
Awake eyes open follows commands
intermittently opens eyes no tracking
Awake alert follows commands
Awake eyes open , tracking at times
Awake, tracking , follows simple  commands, answers yes/no questions with some confusion
More awake , tracking , follows simple commands
Eyes open/tracking movement , shaking head to answer questions
Awake -follows simple commands

## 2021-02-20 NOTE — PROGRESS NOTE ADULT - SUBJECTIVE AND OBJECTIVE BOX
CHIEF COMPLAINT: Patient is a 60y old  Female who presents with a chief complaint of Transfer from [facility] (19 Feb 2021 08:32)      Interval Events: Pt seen and evaluated by team at bedside     REVIEW OF SYSTEMS:  Constitutional:   Eyes:  ENT:  CV:  Resp:  GI:  :  MSK:  Integumentary:  Neurological:  Psychiatric:  Endocrine:  Hematologic/Lymphatic:  Allergic/Immunologic:  [ ] All other systems negative    OBJECTIVE:  ICU Vital Signs Last 24 Hrs  T(C): 36.7 (20 Feb 2021 05:45), Max: 37 (19 Feb 2021 21:07)  T(F): 98 (20 Feb 2021 05:45), Max: 98.6 (19 Feb 2021 21:07)  HR: 95 (20 Feb 2021 05:45) (83 - 99)  BP: 122/50 (20 Feb 2021 05:45) (122/50 - 140/51)  BP(mean): 72 (20 Feb 2021 05:45) (72 - 72)  ABP: --  ABP(mean): --  RR: 20 (20 Feb 2021 05:45) (18 - 22)  SpO2: 99% (20 Feb 2021 05:45) (97% - 100%)    Mode: AC/ CMV (Assist Control/ Continuous Mandatory Ventilation), RR (machine): 12, TV (machine): 450, FiO2: 25, PEEP: 5, MAP: 9, PIP: 18    02-19 @ 07:01  -  02-20 @ 07:00  --------------------------------------------------------  IN: 1340 mL / OUT: 1000 mL / NET: 340 mL      CAPILLARY BLOOD GLUCOSE      POCT Blood Glucose.: 149 mg/dL (20 Feb 2021 06:20)      HOSPITAL MEDICATIONS:  MEDICATIONS  (STANDING):  ALBUTerol    90 MICROgram(s) HFA Inhaler 2 Puff(s) Inhalation every 6 hours  amLODIPine   Tablet 5 milliGRAM(s) Oral daily  ascorbic acid 500 milliGRAM(s) Oral daily  chlorhexidine 4% Liquid 1 Application(s) Topical daily  clonazePAM  Tablet 0.5 milliGRAM(s) Oral daily  collagenase Ointment 1 Application(s) Topical two times a day  dextrose 40% Gel 15 Gram(s) Oral once  dextrose 5%. 1000 milliLiter(s) (50 mL/Hr) IV Continuous <Continuous>  dextrose 5%. 1000 milliLiter(s) (100 mL/Hr) IV Continuous <Continuous>  dextrose 50% Injectable 25 Gram(s) IV Push once  dextrose 50% Injectable 12.5 Gram(s) IV Push once  dextrose 50% Injectable 25 Gram(s) IV Push once  glucagon  Injectable 1 milliGRAM(s) IntraMuscular once  insulin lispro (ADMELOG) corrective regimen sliding scale   SubCutaneous every 6 hours  lactobacillus acidophilus 1 Tablet(s) Oral two times a day  metoprolol tartrate 50 milliGRAM(s) Oral two times a day  multivitamin 1 Tablet(s) Oral daily  nystatin Powder 1 Application(s) Topical every 8 hours  pantoprazole  Injectable 40 milliGRAM(s) IV Push daily  rivaroxaban 20 milliGRAM(s) Oral with dinner  vancomycin    Solution 250 milliGRAM(s) Oral every 6 hours  zinc sulfate 220 milliGRAM(s) Oral daily    MEDICATIONS  (PRN):  acetaminophen    Suspension .. 650 milliGRAM(s) Oral every 6 hours PRN Temp greater or equal to 38C (100.4F), Mild Pain (1 - 3), Moderate Pain (4 - 6)  artificial tears (preservative free) Ophthalmic Solution 1 Drop(s) Both EYES every 8 hours PRN Dry Eyes  oxyCODONE    Solution 5 milliGRAM(s) Oral every 4 hours PRN Severe Pain (7 - 10)      LABS:    02-19    138  |  102  |  10  ----------------------------<  130<H>  3.8   |  21<L>  |  0.24<L>    Ca    10.0      19 Feb 2021 06:58  Phos  3.6     02-19  Mg     1.8     02-19                MICROBIOLOGY:     RADIOLOGY:  [ ] Reviewed and interpreted by me    PULMONARY FUNCTION TESTS:    EKG: CHIEF COMPLAINT: Patient is a 60y old  Female who presents with a chief complaint of Transfer from [facility] (19 Feb 2021 08:32)      Interval Events: Pt seen and evaluated by team at bedside     REVIEW OF SYSTEMS:  Constitutional: Denies pain   CV: Denies   Resp: + MCINTOSH   GI: denies   MSK: Denies   Psychiatric: Calm today   [ xx] All other systems negative    OBJECTIVE:  ICU Vital Signs Last 24 Hrs  T(C): 36.7 (20 Feb 2021 05:45), Max: 37 (19 Feb 2021 21:07)  T(F): 98 (20 Feb 2021 05:45), Max: 98.6 (19 Feb 2021 21:07)  HR: 95 (20 Feb 2021 05:45) (83 - 99)  BP: 122/50 (20 Feb 2021 05:45) (122/50 - 140/51)  BP(mean): 72 (20 Feb 2021 05:45) (72 - 72)  ABP: --  ABP(mean): --  RR: 20 (20 Feb 2021 05:45) (18 - 22)  SpO2: 99% (20 Feb 2021 05:45) (97% - 100%)    Mode: AC/ CMV (Assist Control/ Continuous Mandatory Ventilation), RR (machine): 12, TV (machine): 450, FiO2: 25, PEEP: 5, MAP: 9, PIP: 18    02-19 @ 07:01  -  02-20 @ 07:00  --------------------------------------------------------  IN: 1340 mL / OUT: 1000 mL / NET: 340 mL      CAPILLARY BLOOD GLUCOSE      POCT Blood Glucose.: 149 mg/dL (20 Feb 2021 06:20)      HOSPITAL MEDICATIONS:  MEDICATIONS  (STANDING):  ALBUTerol    90 MICROgram(s) HFA Inhaler 2 Puff(s) Inhalation every 6 hours  amLODIPine   Tablet 5 milliGRAM(s) Oral daily  ascorbic acid 500 milliGRAM(s) Oral daily  chlorhexidine 4% Liquid 1 Application(s) Topical daily  clonazePAM  Tablet 0.5 milliGRAM(s) Oral daily  collagenase Ointment 1 Application(s) Topical two times a day  dextrose 40% Gel 15 Gram(s) Oral once  dextrose 5%. 1000 milliLiter(s) (50 mL/Hr) IV Continuous <Continuous>  dextrose 5%. 1000 milliLiter(s) (100 mL/Hr) IV Continuous <Continuous>  dextrose 50% Injectable 25 Gram(s) IV Push once  dextrose 50% Injectable 12.5 Gram(s) IV Push once  dextrose 50% Injectable 25 Gram(s) IV Push once  glucagon  Injectable 1 milliGRAM(s) IntraMuscular once  insulin lispro (ADMELOG) corrective regimen sliding scale   SubCutaneous every 6 hours  lactobacillus acidophilus 1 Tablet(s) Oral two times a day  metoprolol tartrate 50 milliGRAM(s) Oral two times a day  multivitamin 1 Tablet(s) Oral daily  nystatin Powder 1 Application(s) Topical every 8 hours  pantoprazole  Injectable 40 milliGRAM(s) IV Push daily  rivaroxaban 20 milliGRAM(s) Oral with dinner  vancomycin    Solution 250 milliGRAM(s) Oral every 6 hours  zinc sulfate 220 milliGRAM(s) Oral daily    MEDICATIONS  (PRN):  acetaminophen    Suspension .. 650 milliGRAM(s) Oral every 6 hours PRN Temp greater or equal to 38C (100.4F), Mild Pain (1 - 3), Moderate Pain (4 - 6)  artificial tears (preservative free) Ophthalmic Solution 1 Drop(s) Both EYES every 8 hours PRN Dry Eyes  oxyCODONE    Solution 5 milliGRAM(s) Oral every 4 hours PRN Severe Pain (7 - 10)      LABS:    02-19    138  |  102  |  10  ----------------------------<  130<H>  3.8   |  21<L>  |  0.24<L>    Ca    10.0      19 Feb 2021 06:58  Phos  3.6     02-19  Mg     1.8     02-19                MICROBIOLOGY:     RADIOLOGY:  [ ] Reviewed and interpreted by me    PULMONARY FUNCTION TESTS:    EKG:

## 2021-02-20 NOTE — PROGRESS NOTE ADULT - ATTENDING COMMENTS
61 YO F with PMHx of HTN, DVT on Xarelto, and AARON  p/w AHRF second to COVID 19,  complicated by R lung abscesses, ARTEMIO s/p CRRT and HD, and s/p Tracheostomy 12/18 c/b trach leak and s/p 8Bivona and now cdiff. Pt awake and alert and denies complaints. diarrhea  resolved. cont vanco for cdiff. Cont to have significant weakness of all extremities caleb UE. Cont PT and OT. Doing well on TC. Discharge planning.

## 2021-02-21 LAB
ANION GAP SERPL CALC-SCNC: 15 MMOL/L — HIGH (ref 7–14)
BUN SERPL-MCNC: 8 MG/DL — SIGNIFICANT CHANGE UP (ref 7–23)
CALCIUM SERPL-MCNC: 9.7 MG/DL — SIGNIFICANT CHANGE UP (ref 8.4–10.5)
CHLORIDE SERPL-SCNC: 101 MMOL/L — SIGNIFICANT CHANGE UP (ref 98–107)
CO2 SERPL-SCNC: 20 MMOL/L — LOW (ref 22–31)
CREAT SERPL-MCNC: 0.26 MG/DL — LOW (ref 0.5–1.3)
GLUCOSE BLDC GLUCOMTR-MCNC: 123 MG/DL — HIGH (ref 70–99)
GLUCOSE BLDC GLUCOMTR-MCNC: 125 MG/DL — HIGH (ref 70–99)
GLUCOSE BLDC GLUCOMTR-MCNC: 148 MG/DL — HIGH (ref 70–99)
GLUCOSE BLDC GLUCOMTR-MCNC: 149 MG/DL — HIGH (ref 70–99)
GLUCOSE SERPL-MCNC: 114 MG/DL — HIGH (ref 70–99)
HCT VFR BLD CALC: 34.1 % — LOW (ref 34.5–45)
HGB BLD-MCNC: 10.2 G/DL — LOW (ref 11.5–15.5)
MAGNESIUM SERPL-MCNC: 1.6 MG/DL — SIGNIFICANT CHANGE UP (ref 1.6–2.6)
MCHC RBC-ENTMCNC: 28.1 PG — SIGNIFICANT CHANGE UP (ref 27–34)
MCHC RBC-ENTMCNC: 29.9 GM/DL — LOW (ref 32–36)
MCV RBC AUTO: 93.9 FL — SIGNIFICANT CHANGE UP (ref 80–100)
NRBC # BLD: 0 /100 WBCS — SIGNIFICANT CHANGE UP
NRBC # FLD: 0 K/UL — SIGNIFICANT CHANGE UP
PHOSPHATE SERPL-MCNC: 4.9 MG/DL — HIGH (ref 2.5–4.5)
PLATELET # BLD AUTO: 215 K/UL — SIGNIFICANT CHANGE UP (ref 150–400)
POTASSIUM SERPL-MCNC: 3.7 MMOL/L — SIGNIFICANT CHANGE UP (ref 3.5–5.3)
POTASSIUM SERPL-SCNC: 3.7 MMOL/L — SIGNIFICANT CHANGE UP (ref 3.5–5.3)
RBC # BLD: 3.63 M/UL — LOW (ref 3.8–5.2)
RBC # FLD: 16.3 % — HIGH (ref 10.3–14.5)
SODIUM SERPL-SCNC: 136 MMOL/L — SIGNIFICANT CHANGE UP (ref 135–145)
WBC # BLD: 8.55 K/UL — SIGNIFICANT CHANGE UP (ref 3.8–10.5)
WBC # FLD AUTO: 8.55 K/UL — SIGNIFICANT CHANGE UP (ref 3.8–10.5)

## 2021-02-21 PROCEDURE — 99233 SBSQ HOSP IP/OBS HIGH 50: CPT | Mod: GC

## 2021-02-21 RX ADMIN — Medication 250 MILLIGRAM(S): at 17:33

## 2021-02-21 RX ADMIN — RIVAROXABAN 20 MILLIGRAM(S): KIT at 17:32

## 2021-02-21 RX ADMIN — Medication 250 MILLIGRAM(S): at 05:38

## 2021-02-21 RX ADMIN — Medication 1 DROP(S): at 11:42

## 2021-02-21 RX ADMIN — NYSTATIN CREAM 1 APPLICATION(S): 100000 CREAM TOPICAL at 22:36

## 2021-02-21 RX ADMIN — NYSTATIN CREAM 1 APPLICATION(S): 100000 CREAM TOPICAL at 05:39

## 2021-02-21 RX ADMIN — Medication 1 APPLICATION(S): at 17:32

## 2021-02-21 RX ADMIN — ALBUTEROL 2 PUFF(S): 90 AEROSOL, METERED ORAL at 04:34

## 2021-02-21 RX ADMIN — NYSTATIN CREAM 1 APPLICATION(S): 100000 CREAM TOPICAL at 12:00

## 2021-02-21 RX ADMIN — CHLORHEXIDINE GLUCONATE 1 APPLICATION(S): 213 SOLUTION TOPICAL at 11:44

## 2021-02-21 RX ADMIN — Medication 500 MILLIGRAM(S): at 11:45

## 2021-02-21 RX ADMIN — ZINC SULFATE TAB 220 MG (50 MG ZINC EQUIVALENT) 220 MILLIGRAM(S): 220 (50 ZN) TAB at 11:43

## 2021-02-21 RX ADMIN — AMLODIPINE BESYLATE 5 MILLIGRAM(S): 2.5 TABLET ORAL at 05:39

## 2021-02-21 RX ADMIN — Medication 250 MILLIGRAM(S): at 11:43

## 2021-02-21 RX ADMIN — Medication 50 MILLIGRAM(S): at 17:33

## 2021-02-21 RX ADMIN — Medication 1 TABLET(S): at 05:39

## 2021-02-21 RX ADMIN — Medication 1 TABLET(S): at 11:43

## 2021-02-21 RX ADMIN — PANTOPRAZOLE SODIUM 40 MILLIGRAM(S): 20 TABLET, DELAYED RELEASE ORAL at 11:45

## 2021-02-21 RX ADMIN — Medication 250 MILLIGRAM(S): at 22:37

## 2021-02-21 RX ADMIN — ALBUTEROL 2 PUFF(S): 90 AEROSOL, METERED ORAL at 17:01

## 2021-02-21 RX ADMIN — Medication 1 TABLET(S): at 17:32

## 2021-02-21 RX ADMIN — OXYCODONE HYDROCHLORIDE 5 MILLIGRAM(S): 5 TABLET ORAL at 11:41

## 2021-02-21 RX ADMIN — ALBUTEROL 2 PUFF(S): 90 AEROSOL, METERED ORAL at 11:01

## 2021-02-21 RX ADMIN — Medication 1 APPLICATION(S): at 05:38

## 2021-02-21 RX ADMIN — Medication 0.5 MILLIGRAM(S): at 11:44

## 2021-02-21 RX ADMIN — Medication 50 MILLIGRAM(S): at 05:38

## 2021-02-21 NOTE — PROGRESS NOTE ADULT - MS EXT PE MLT D E PC
no pedal edema
BIlat edema
normal
Bilat tr edema
tr edema
BIlat edema of le
bilat tr edema
BIlat edema
bilat tr edema
no pedal edema
BIlat edema
Bilat edema tr
Bilat tr edema
bilat tr edema
no clubbing/no pedal edema
tr edema
bilat tr edema
tr edema
Bilat edema
tr edema
tr edema/no pedal edema
bilat edema
tr edema
bilat tr edema

## 2021-02-21 NOTE — PROGRESS NOTE ADULT - SKIN COMMENTS
AAI sheets for skin monitoring
AAI sheets for monitoring skin
AAI sheets for monitoring /seen by wound care
AAI sheets for monitoring
AAI sheets for skin monitoring
Reconsult wound care / AAI sheets for monitoring
AAI sheets for monitoring
AAI sheets for skin monitoring
AAi sheets for skin monitoring
seen by plastics for facial eschar
AAI sheets for monitoring
AAI sheets for skin monitoring
AAI sheets for monitoring skin
AAi sheets for monitoring
AAI sheets for skin monitoring Seen by plastic surgery

## 2021-02-21 NOTE — PROGRESS NOTE ADULT - ASSESSMENT
59 YO F with PMHx of HTN, DVT on Xarelto, Peritonitis s/p Oral's Procedure and Ileostomy (reversed in 2011) and AARON who presented to [facility] on 11/18 for AHRF second to COVID 19, intubated on 11/23 complicated by R lung abscesses on CT CHEST 12/5, multibacterial PNA and bacteremia, ARTEMIO s/p CRRT and HD, and s/p Tracheostomy 12/18 c/b track leak and s/p OR 8Bivona. Transferred to RCU for furhter management.     # AMS/ Anxiety   - AOX3 at baseline.   - CTH and EEG WNL   - Now weaned off all sedation and mentation improved.   - Anxiety noted and Klonopin started. Supportive care.     # ARDS second to COVID vs Multi-bacterial PNA/ R Lung Abscess  - Intubated 11/23 and course complicated with multi-bacterial PNA and lung abscesses.   - s/p Tracheostomy on 12/18 by CTSx. Sutures out 1/1  - Air leak noted and s/p Trach Exchange for Bivona with CTSx 12/31  - Tolerating TC with PMV QD and Vent QHS .   - Proventil, IPV and Chest PT Q6H and Suction PRN. Trach Care QD.     # Vasoplegic vs Septic Shock   - Weaned off pressors. Monitor BP on Norvasc and Metoprolol.   - On full AC for Hx of DVT. CTA CHEST with no PEs.     # ARTEMIO s/p CRRT and HD   - ARTEMIO now resolved and no longer required HD.   - Hypokalemia in setting of diarrhea/ CDIFF. Monitor electrolytes.   - Monitor renal function and avoid nephrotoxins.     # GI   - Initially CTSx and GI deferred PEG given Ventral Hernia   - s/p PEG placement with IR 1/11   - Continue on TF and monitor tolerance.   - Monitor diarrhea as CDIFF (+). Hold laxatives and continue on PO Vancomycin as below     # Sepsis second to COVID vs Multi-bacterial PNA/ Bacteremia and R Lung Abscess vs CDIFF (+)   - COVID with superimposed multi-bacterial VAP vs aspiration PNA vs cavitary PNA.   - SCx (11/24) MSSA and BCx (11/27) with MSSA and Proteus Bacteremia  - SCx (12/1, 12/12 and 12/27) with Stenotrophomonas.   - SCx (1/9) with  Pseudomonas and Stenotrophomonas   - BCX has been persistently negative from 12/1, and most recently 1/12   - CT CHEST 12/4 with right lung cavitation.   - s/p multiple antibiotics, but now completed Fortaz and Flagyl (1/12-1/16)  - RPT CT CHEST 1/28 with improvement of right lung abscess   - SCx with  Pseudomonas and Stenotrophomonas and s/p TED/ Fortaz (1/28-2/6)    # CDIFF (+)   - Started on PO Vancomycin 2/1, then increased to high dose 2/12-2/19  - Questran daily with improved stools.   - ID following     # DM2   - Continue on ISS and NPH and monitor FS.   - Adjust insulin as needed.     # Hx of DVT   - Continue on Xarelto.     # Skin/ Facial Wounds   - WOC recommendations appreciated   - Plastics evaluation appreciated    # Contracted Wrists   - PT/OT working on strength.     # DVT/ GI PPX with FULL AC/ PPI   # CODE STATUS - FULL CODE   # DISPO - PT recc Rehab/ Vent Facility  61 YO F with PMHx of HTN, DVT on Xarelto, Peritonitis s/p Oral's Procedure and Ileostomy (reversed in 2011) and AARON who presented to [facility] on 11/18 for AHRF second to COVID 19, intubated on 11/23 complicated by R lung abscesses on CT CHEST 12/5, multibacterial PNA and bacteremia, ARTEMIO s/p CRRT and HD, and s/p Tracheostomy 12/18 c/b track leak and s/p OR 8Bivona. Transferred to RCU for furhter management.     # AMS/ Anxiety   - AOX3 at baseline.   - CTH and EEG WNL   - Now weaned off all sedation and mentation improved.   - Anxiety noted and Klonopin started. Supportive care.   - More cooperative, less anxious tolerating TC     # ARDS second to COVID vs Multi-bacterial PNA/ R Lung Abscess  - Intubated 11/23 and course complicated with multi-bacterial PNA and lung abscesses.   - s/p Tracheostomy on 12/18 by CTSx. Sutures out 1/1  - Air leak noted and s/p Trach Exchange for Bivona with CTSx 12/31  - Tolerating TC with PMV QD and Vent QHS .   - Proventil, IPV and Chest PT Q6H and Suction PRN. Trach Care QD.     # Vasoplegic vs Septic Shock   - Weaned off pressors. Monitor BP on Norvasc and Metoprolol.   - On full AC for Hx of DVT. CTA CHEST with no PEs.     # ARTEMIO s/p CRRT and HD   - ARTEMIO now resolved and no longer required HD.   - Hypokalemia in setting of diarrhea/ CDIFF. Monitor electrolytes.   - Monitor renal function and avoid nephrotoxins.     # GI   - Initially CTSx and GI deferred PEG given Ventral Hernia   - s/p PEG placement with IR 1/11   - Continue on TF and monitor tolerance.   - Monitor diarrhea as CDIFF (+). Hold laxatives and continue on PO Vancomycin as below     # Sepsis second to COVID vs Multi-bacterial PNA/ Bacteremia and R Lung Abscess vs CDIFF (+)   - COVID with superimposed multi-bacterial VAP vs aspiration PNA vs cavitary PNA.   - SCx (11/24) MSSA and BCx (11/27) with MSSA and Proteus Bacteremia  - SCx (12/1, 12/12 and 12/27) with Stenotrophomonas.   - SCx (1/9) with  Pseudomonas and Stenotrophomonas   - BCX has been persistently negative from 12/1, and most recently 1/12   - CT CHEST 12/4 with right lung cavitation.   - s/p multiple antibiotics, but now completed Fortaz and Flagyl (1/12-1/16)  - RPT CT CHEST 1/28 with improvement of right lung abscess   - SCx with  Pseudomonas and Stenotrophomonas and s/p TED/ Fortaz (1/28-2/6)    # CDIFF (+)   - Started on PO Vancomycin 2/1, then increased to high dose 2/12-2/19  - Questran daily with improved stools. Still having loose stool output   - ID following will fu     # DM2   - Continue on ISS and NPH and monitor FS.   - Adjust insulin as needed.     # Hx of DVT   - Continue on Xarelto.     # Skin/ Facial Wounds   - WOC recommendations appreciated   - Plastics evaluation appreciated    # Contracted Wrists   - PT/OT working on strength.     # DVT/ GI PPX with FULL AC/ PPI   # CODE STATUS - FULL CODE   # DISPO - PT recc Rehab/ Vent Facility

## 2021-02-21 NOTE — PROGRESS NOTE ADULT - SUBJECTIVE AND OBJECTIVE BOX
CHIEF COMPLAINT: Patient is a 60y old  Female who presents with a chief complaint of Transfer from [facility] (20 Feb 2021 07:41)      Interval Events: Pt seen and evaluated at bedside. Tolerating trach collar during day     REVIEW OF SYSTEMS:  Constitutional:   Eyes:  ENT:  CV:  Resp:  GI:  :  MSK:  Integumentary:  Neurological:  Psychiatric:  Endocrine:  Hematologic/Lymphatic:  Allergic/Immunologic:  [ ] All other systems negative      OBJECTIVE:  ICU Vital Signs Last 24 Hrs  T(C): 36.1 (21 Feb 2021 05:34), Max: 36.6 (20 Feb 2021 12:24)  T(F): 97 (21 Feb 2021 05:34), Max: 97.8 (20 Feb 2021 12:24)  HR: 91 (21 Feb 2021 05:34) (85 - 99)  BP: 126/55 (21 Feb 2021 05:34) (122/99 - 155/63)  BP(mean): 77 (21 Feb 2021 05:34) (77 - 77)  ABP: --  ABP(mean): --  RR: 19 (21 Feb 2021 05:34) (19 - 25)  SpO2: 98% (21 Feb 2021 05:34) (96% - 100%)    Mode: AC/ CMV (Assist Control/ Continuous Mandatory Ventilation), RR (machine): 12, TV (machine): 450, FiO2: 25, PEEP: 5, ITime: 1, MAP: 10, PIP: 18    02-20 @ 07:01  -  02-21 @ 07:00  --------------------------------------------------------  IN: 800 mL / OUT: 300 mL / NET: 500 mL      CAPILLARY BLOOD GLUCOSE      POCT Blood Glucose.: 125 mg/dL (21 Feb 2021 05:38)      HOSPITAL MEDICATIONS:  MEDICATIONS  (STANDING):  ALBUTerol    90 MICROgram(s) HFA Inhaler 2 Puff(s) Inhalation every 6 hours  amLODIPine   Tablet 5 milliGRAM(s) Oral daily  ascorbic acid 500 milliGRAM(s) Oral daily  chlorhexidine 4% Liquid 1 Application(s) Topical daily  clonazePAM  Tablet 0.5 milliGRAM(s) Oral daily  collagenase Ointment 1 Application(s) Topical two times a day  dextrose 40% Gel 15 Gram(s) Oral once  dextrose 5%. 1000 milliLiter(s) (50 mL/Hr) IV Continuous <Continuous>  dextrose 5%. 1000 milliLiter(s) (100 mL/Hr) IV Continuous <Continuous>  dextrose 50% Injectable 25 Gram(s) IV Push once  dextrose 50% Injectable 12.5 Gram(s) IV Push once  dextrose 50% Injectable 25 Gram(s) IV Push once  glucagon  Injectable 1 milliGRAM(s) IntraMuscular once  insulin lispro (ADMELOG) corrective regimen sliding scale   SubCutaneous every 6 hours  lactobacillus acidophilus 1 Tablet(s) Oral two times a day  metoprolol tartrate 50 milliGRAM(s) Oral two times a day  multivitamin 1 Tablet(s) Oral daily  nystatin Powder 1 Application(s) Topical every 8 hours  pantoprazole  Injectable 40 milliGRAM(s) IV Push daily  rivaroxaban 20 milliGRAM(s) Oral with dinner  vancomycin    Solution 250 milliGRAM(s) Oral every 6 hours  zinc sulfate 220 milliGRAM(s) Oral daily    MEDICATIONS  (PRN):  acetaminophen    Suspension .. 650 milliGRAM(s) Oral every 6 hours PRN Temp greater or equal to 38C (100.4F), Mild Pain (1 - 3), Moderate Pain (4 - 6)  artificial tears (preservative free) Ophthalmic Solution 1 Drop(s) Both EYES every 8 hours PRN Dry Eyes  oxyCODONE    Solution 5 milliGRAM(s) Oral every 4 hours PRN Severe Pain (7 - 10)      LABS:                        10.2   8.55  )-----------( 215      ( 21 Feb 2021 04:32 )             34.1     02-21    136  |  101  |  8   ----------------------------<  114<H>  3.7   |  20<L>  |  0.26<L>    Ca    9.7      21 Feb 2021 04:32  Phos  4.9     02-21  Mg     1.6     02-21                MICROBIOLOGY:     RADIOLOGY:  [ ] Reviewed and interpreted by me    PULMONARY FUNCTION TESTS:    EKG: CHIEF COMPLAINT: Patient is a 60y old  Female who presents with a chief complaint of Transfer from [facility] (20 Feb 2021 07:41)      Interval Events: Pt seen and evaluated at bedside. Tolerating trach collar during day. Encouraged to use trach collar for longer periods as long as tolerating.    REVIEW OF SYSTEMS:  Constitutional: Feels well, denies pain   CV: Denies   Resp: Denies   GI: Denies   [x ] All other systems negative      OBJECTIVE:  ICU Vital Signs Last 24 Hrs  T(C): 36.1 (21 Feb 2021 05:34), Max: 36.6 (20 Feb 2021 12:24)  T(F): 97 (21 Feb 2021 05:34), Max: 97.8 (20 Feb 2021 12:24)  HR: 91 (21 Feb 2021 05:34) (85 - 99)  BP: 126/55 (21 Feb 2021 05:34) (122/99 - 155/63)  BP(mean): 77 (21 Feb 2021 05:34) (77 - 77)  ABP: --  ABP(mean): --  RR: 19 (21 Feb 2021 05:34) (19 - 25)  SpO2: 98% (21 Feb 2021 05:34) (96% - 100%)    Mode: AC/ CMV (Assist Control/ Continuous Mandatory Ventilation), RR (machine): 12, TV (machine): 450, FiO2: 25, PEEP: 5, ITime: 1, MAP: 10, PIP: 18    02-20 @ 07:01  -  02-21 @ 07:00  --------------------------------------------------------  IN: 800 mL / OUT: 300 mL / NET: 500 mL      CAPILLARY BLOOD GLUCOSE      POCT Blood Glucose.: 125 mg/dL (21 Feb 2021 05:38)      HOSPITAL MEDICATIONS:  MEDICATIONS  (STANDING):  ALBUTerol    90 MICROgram(s) HFA Inhaler 2 Puff(s) Inhalation every 6 hours  amLODIPine   Tablet 5 milliGRAM(s) Oral daily  ascorbic acid 500 milliGRAM(s) Oral daily  chlorhexidine 4% Liquid 1 Application(s) Topical daily  clonazePAM  Tablet 0.5 milliGRAM(s) Oral daily  collagenase Ointment 1 Application(s) Topical two times a day  dextrose 40% Gel 15 Gram(s) Oral once  dextrose 5%. 1000 milliLiter(s) (50 mL/Hr) IV Continuous <Continuous>  dextrose 5%. 1000 milliLiter(s) (100 mL/Hr) IV Continuous <Continuous>  dextrose 50% Injectable 25 Gram(s) IV Push once  dextrose 50% Injectable 12.5 Gram(s) IV Push once  dextrose 50% Injectable 25 Gram(s) IV Push once  glucagon  Injectable 1 milliGRAM(s) IntraMuscular once  insulin lispro (ADMELOG) corrective regimen sliding scale   SubCutaneous every 6 hours  lactobacillus acidophilus 1 Tablet(s) Oral two times a day  metoprolol tartrate 50 milliGRAM(s) Oral two times a day  multivitamin 1 Tablet(s) Oral daily  nystatin Powder 1 Application(s) Topical every 8 hours  pantoprazole  Injectable 40 milliGRAM(s) IV Push daily  rivaroxaban 20 milliGRAM(s) Oral with dinner  vancomycin    Solution 250 milliGRAM(s) Oral every 6 hours  zinc sulfate 220 milliGRAM(s) Oral daily    MEDICATIONS  (PRN):  acetaminophen    Suspension .. 650 milliGRAM(s) Oral every 6 hours PRN Temp greater or equal to 38C (100.4F), Mild Pain (1 - 3), Moderate Pain (4 - 6)  artificial tears (preservative free) Ophthalmic Solution 1 Drop(s) Both EYES every 8 hours PRN Dry Eyes  oxyCODONE    Solution 5 milliGRAM(s) Oral every 4 hours PRN Severe Pain (7 - 10)      LABS:                        10.2   8.55  )-----------( 215      ( 21 Feb 2021 04:32 )             34.1     02-21    136  |  101  |  8   ----------------------------<  114<H>  3.7   |  20<L>  |  0.26<L>    Ca    9.7      21 Feb 2021 04:32  Phos  4.9     02-21  Mg     1.6     02-21                MICROBIOLOGY:     RADIOLOGY:  [ ] Reviewed and interpreted by me    PULMONARY FUNCTION TESTS:    EKG:

## 2021-02-21 NOTE — PROGRESS NOTE ADULT - ATTENDING COMMENTS
59 YO F with PMHx of HTN, DVT on Xarelto, and AARON  p/w AHRF second to COVID 19,  complicated by R lung abscesses, ARTEMIO s/p CRRT and HD, and s/p Tracheostomy 12/18 c/b trach leak and s/p 8Bivona and now cdiff. Pt awake and alert and denies complaints. diarrhea  resolved. cont vanco for cdiff. Cont to have significant weakness of all extremities caleb UE. Cont PT and OT. Doing well on TC. Discharge planning.

## 2021-02-21 NOTE — PROGRESS NOTE ADULT - MOUTH
moist

## 2021-02-22 LAB
GLUCOSE BLDC GLUCOMTR-MCNC: 125 MG/DL — HIGH (ref 70–99)
GLUCOSE BLDC GLUCOMTR-MCNC: 139 MG/DL — HIGH (ref 70–99)
GLUCOSE BLDC GLUCOMTR-MCNC: 162 MG/DL — HIGH (ref 70–99)
GLUCOSE BLDC GLUCOMTR-MCNC: 170 MG/DL — HIGH (ref 70–99)

## 2021-02-22 PROCEDURE — 99233 SBSQ HOSP IP/OBS HIGH 50: CPT

## 2021-02-22 RX ADMIN — Medication 250 MILLIGRAM(S): at 17:31

## 2021-02-22 RX ADMIN — ALBUTEROL 2 PUFF(S): 90 AEROSOL, METERED ORAL at 03:55

## 2021-02-22 RX ADMIN — Medication 500 MILLIGRAM(S): at 11:53

## 2021-02-22 RX ADMIN — Medication 1 APPLICATION(S): at 17:31

## 2021-02-22 RX ADMIN — AMLODIPINE BESYLATE 5 MILLIGRAM(S): 2.5 TABLET ORAL at 05:22

## 2021-02-22 RX ADMIN — ZINC SULFATE TAB 220 MG (50 MG ZINC EQUIVALENT) 220 MILLIGRAM(S): 220 (50 ZN) TAB at 11:53

## 2021-02-22 RX ADMIN — PANTOPRAZOLE SODIUM 40 MILLIGRAM(S): 20 TABLET, DELAYED RELEASE ORAL at 11:52

## 2021-02-22 RX ADMIN — NYSTATIN CREAM 1 APPLICATION(S): 100000 CREAM TOPICAL at 22:22

## 2021-02-22 RX ADMIN — Medication 2: at 11:54

## 2021-02-22 RX ADMIN — Medication 250 MILLIGRAM(S): at 05:23

## 2021-02-22 RX ADMIN — Medication 2: at 05:21

## 2021-02-22 RX ADMIN — NYSTATIN CREAM 1 APPLICATION(S): 100000 CREAM TOPICAL at 12:03

## 2021-02-22 RX ADMIN — ALBUTEROL 2 PUFF(S): 90 AEROSOL, METERED ORAL at 09:37

## 2021-02-22 RX ADMIN — RIVAROXABAN 20 MILLIGRAM(S): KIT at 17:30

## 2021-02-22 RX ADMIN — Medication 1 TABLET(S): at 05:22

## 2021-02-22 RX ADMIN — Medication 50 MILLIGRAM(S): at 17:30

## 2021-02-22 RX ADMIN — NYSTATIN CREAM 1 APPLICATION(S): 100000 CREAM TOPICAL at 05:23

## 2021-02-22 RX ADMIN — CHLORHEXIDINE GLUCONATE 1 APPLICATION(S): 213 SOLUTION TOPICAL at 11:53

## 2021-02-22 RX ADMIN — Medication 100 MILLIGRAM(S): at 05:21

## 2021-02-22 RX ADMIN — Medication 0.5 MILLIGRAM(S): at 11:51

## 2021-02-22 RX ADMIN — ALBUTEROL 2 PUFF(S): 90 AEROSOL, METERED ORAL at 23:46

## 2021-02-22 RX ADMIN — Medication 50 MILLIGRAM(S): at 05:22

## 2021-02-22 RX ADMIN — ALBUTEROL 2 PUFF(S): 90 AEROSOL, METERED ORAL at 15:21

## 2021-02-22 RX ADMIN — Medication 1 TABLET(S): at 17:30

## 2021-02-22 RX ADMIN — Medication 1 TABLET(S): at 11:53

## 2021-02-22 RX ADMIN — Medication 250 MILLIGRAM(S): at 11:52

## 2021-02-22 RX ADMIN — Medication 1 APPLICATION(S): at 05:22

## 2021-02-22 NOTE — PROGRESS NOTE ADULT - ATTENDING COMMENTS
Agree with plan as outlined above. Patient seen and examined at bedside. Patient history, laboratory data, and imaging personally reviewed.    Pt is a 60F with PMHx as above presenting to [facility] for hypoxemic respiratory failure with ARDS 2/2 COVID19 PNA further c/b superimposed MSSA cavitary PNA and ARF requiring HD. Pt with failure to wean from mechanical ventilation, s/p tracheostomy placement 12/18 and transfer to RCU 12/28.     Pt now with significantly improving encephalopathy, able to communicate effectively and answer questions/follow commands. Remains off all IV sedation since 12/24. CT Head (-) 12/12. EEG (-). No indication for further neurologic w/u at this time given clinical improvement. OOB to chair as tolerated. Moves all 4 extremities independently, but remains physically severely deconditioned and dependent of ADLs likely 2/2 critical illness polyneuropathy.     Pt with trach placement (CTSx, 12/18 Proximal 7XLT and then changed to 8Fr Bivona 12/31 for persistent air leak), now with resolution of air leak and significant improvement in ventilator synchrony. Daily SBTs as tolerated. Pt tolerating trach collar QD, PSV qhs. Pt able to tolerate PMV intermittently, but is not independent in use. Will c/w airway clearance therapy and trach care as per RCU team.      Pt initially p/w acute hypoxemic respiratory failure 2/2 COVID19 PNA but with hospital course c/b MSSA bacteremia with cavitary PNA s/p completion of Abx with Primaxin on 12/28 followed by course of Ceftazidime through 1/5 for Stenotrophomonas PNA followed by VAP 2/2 Stenotrophomonas and Pseudomonas 1/12 s/p completion of Flagyl+Ceftazidime+Vancomycin with short-term clinical improvement followed by recurrent of VAP 2/2 CRE Pseudomonas/Stenotrophomonas, s/p eradication therapy with IV+Nebulized Abx 1/28-2/6. Repeat CT Chest performed 12/4 showed significantly improved cavitary lesions from prior MSSA PNA without area requiring further source control. Hospital course then c/b CDiff colitis 2/1, now on PO Vancomycin with improvement in diarrhea but persistent loose stools.     Pt with oropharyngeal dysphagia, IR guided PEG successfully placed 1/11. Pt tolerating PEG feeds. Pt also initially with ARF on CRRT, following by intermittent HD (last session 12/17) with renal recovery. NPH+HISS for glucose control. Wound consult and plastic sx recs appreciated.    Dispo pending medical optimization, likely ROJELIO.

## 2021-02-22 NOTE — PROGRESS NOTE ADULT - ASSESSMENT
59 YO F with PMHx of HTN, DVT on Xarelto, Peritonitis s/p Oral's Procedure and Ileostomy (reversed in 2011) and AARON who presented to [facility] on 11/18 for AHRF second to COVID 19, intubated on 11/23 complicated by R lung abscesses on CT CHEST 12/5, multibacterial PNA and bacteremia, ARTEMIO s/p CRRT and HD, and s/p Tracheostomy 12/18 c/b track leak and s/p OR 8Bivona. Transferred to [facility] for furhter management.     # AMS/ Anxiety   - AOX3 at baseline.   - CTH and EEG WNL   - Now weaned off all sedation and mentation improved.   - Anxiety noted and Klonopin started. Supportive care.     # ARDS second to COVID vs Multi-bacterial PNA/ R Lung Abscess  - Intubated 11/23 and course complicated with multi-bacterial PNA and lung abscesses.   - s/p Tracheostomy on 12/18 by CTSx. Sutures out 1/1  - Air leak noted and s/p Trach Exchange for Bivona with CTSx 12/31  - Tolerating TC with PMV QD and Vent QHS .   - Proventil, IPV and Chest PT Q6H and Suction PRN. Trach Care QD.     # Vasoplegic vs Septic Shock   - Weaned off pressors. Monitor BP on Norvasc and Metoprolol.   - On full AC for Hx of DVT. CTA CHEST with no PEs.     # ARTEMIO s/p CRRT and HD   - ARTEMIO now resolved and no longer required HD.   - Hypokalemia in setting of diarrhea/ CDIFF. Monitor electrolytes.   - Monitor renal function and avoid nephrotoxins.     # GI   - Initially CTSx and GI deferred PEG given Ventral Hernia   - s/p PEG placement with IR 1/11   - Continue on TF and monitor tolerance.   - Monitor diarrhea as CDIFF (+). Hold laxatives and continue on PO Vancomycin as below     # Sepsis second to COVID vs Multi-bacterial PNA/ Bacteremia and R Lung Abscess vs CDIFF (+)   - COVID with superimposed multi-bacterial VAP vs aspiration PNA vs cavitary PNA.   - SCx (11/24) MSSA and BCx (11/27) with MSSA and Proteus Bacteremia  - SCx (12/1, 12/12 and 12/27) with Stenotrophomonas.   - SCx (1/9) with  Pseudomonas and Stenotrophomonas   - BCX has been persistently negative from 12/1, and most recently 1/12   - CT CHEST 12/4 with right lung cavitation.   - s/p multiple antibiotics, but now completed Fortaz and Flagyl (1/12-1/16)  - RPT CT CHEST 1/28 with improvement of right lung abscess   - SCx with  Pseudomonas and Stenotrophomonas and s/p TED/ Fortaz (1/28-2/6)    # CDIFF (+)   - Started on PO Vancomycin 2/1, then increased to high dose 2/12   - Questran daily with improved stools. Still having loose stool output   - ID following     # DM2   - Continue on ISS and NPH and monitor FS. Adjust insulin as needed.     # Hx of DVT   - Continue on Xarelto.     # Skin/ Facial Wounds   - WOC recommendations appreciated   - Plastics evaluation appreciated    # Contracted Wrists   - PT/OT working on strength.     # DVT/ GI PPX with FULL AC/ PPI   # CODE STATUS - FULL CODE   # DISPO - PT recc Rehab/ Vent Facility  61 YO F with PMHx of HTN, DVT on Xarelto, Peritonitis s/p Oral's Procedure and Ileostomy (reversed in 2011) and AARON who presented to [facility] on 11/18 for AHRF second to COVID 19, intubated on 11/23 complicated by R lung abscesses on CT CHEST 12/5, multibacterial PNA and bacteremia, ARTEMIO s/p CRRT and HD, and s/p Tracheostomy 12/18 c/b track leak and s/p OR 8Bivona. Transferred to [facility] for furhter management.     # AMS/ Anxiety   - AOX3 at baseline.   - CTH and EEG WNL   - Anxiety noted and Klonopin started.   - Supportive care.     # ARDS second to COVID vs Multi-bacterial PNA/ R Lung Abscess  - Intubated 11/23 and c/b multi-bacterial PNA and lung abscesses.   - s/p Tracheostomy on 12/18 by CTSx. Sutures out 1/1  - Air leak noted and s/p Trach Exchange for Bivona with CTSx 12/31  - Tolerating TC with PMV QD and Vent QHS .   - Proventil, IPV and Chest PT Q6H and Suction PRN. Trach Care QD.     # Vasoplegic vs Septic Shock   - Continue on Norvasc and Metoprolol.   - On full AC for Hx of DVT. CTA CHEST with no PEs.     # ARTEMIO s/p CRRT and HD   - ARTEMIO now resolved and no longer required HD.   - Hypokalemia in setting of diarrhea/ CDIFF. Monitor electrolytes.   - Monitor renal function and avoid nephrotoxins.     # GI   - s/p PEG placement with IR 1/11   - Continue on TF and monitor tolerance.     # Sepsis second to COVID vs Multi-bacterial PNA/ Bacteremia and R Lung Abscess vs CDIFF (+)   - COVID with superimposed multi-bacterial VAP vs aspiration PNA vs cavitary PNA.   - SCx (11/24) MSSA and BCx (11/27) with MSSA and Proteus Bacteremia  - SCx (12/1, 12/12 and 12/27) with Stenotrophomonas.   - SCx (1/9) with  Pseudomonas and Stenotrophomonas   - BCX has been persistently negative from 12/1, and most recently 1/12   - CT CHEST 12/4 with right lung cavitation.   - s/p multiple antibiotics, but now completed Fortaz and Flagyl (1/12-1/16)  - RPT CT CHEST 1/28 with improvement of right lung abscess   - SCx with  Pseudomonas and Stenotrophomonas and s/p TED/ Fortaz (1/28-2/6)    # CDIFF (+)   - Started on PO Vancomycin 2/1, then increased to high dose 2/12-2/22  - Questran daily with improved stools.   - ID following     # DM2   - Continue on ISS and NPH and monitor FS. Adjust insulin as needed.     # Hx of DVT   - Continue on Xarelto.     # Skin/ Facial Wounds   - WOC recommendations appreciated   - Plastics evaluation appreciated    # Contracted Wrists   - PT/OT working on strength.     # DVT/ GI PPX with FULL AC/ PPI   # CODE STATUS - FULL CODE   # DISPO - PT recc Rehab/ Vent Facility

## 2021-02-22 NOTE — PROGRESS NOTE ADULT - NUTRITIONAL ASSESSMENT
This patient has been assessed with a concern for Malnutrition and has been determined to have a diagnosis/diagnoses of Severe protein-calorie malnutrition and Morbid obesity (BMI > 40).    This patient is being managed with:   Diet NPO with Tube Feed-  Tube Feeding Modality: Gastrostomy  Peptamen 1.5 (PEPTAMEN1.5RTH)  Total Volume for 24 Hours (mL): 1080  Continuous  Until Goal Tube Feed Rate (mL per Hour): 45  Tube Feed Duration (in Hours): 24  Tube Feed Start Time: 12:00  No Carb Prosource (1pkg = 15gms Protein)     Qty per Day:  2  Entered: Feb 16 2021 11:49AM     This patient has been assessed with a concern for Malnutrition and has been determined to have a diagnosis/diagnoses of Severe protein-calorie malnutrition and Morbid obesity (BMI > 40).    This patient is being managed with:   Diet NPO with Tube Feed-  Tube Feeding Modality: Gastrostomy  Peptamen 1.5 (PEPTAMEN1.5RTH)  Total Volume for 24 Hours (mL): 1080  Continuous  Until Goal Tube Feed Rate (mL per Hour): 45  Tube Feed Duration (in Hours): 24  Tube Feed Start Time: 12:00  No Carb Prosource (1pkg = 15gms Protein)     Qty per Day:  2  Entered: Feb 16 2021 11:49AM

## 2021-02-22 NOTE — PROGRESS NOTE ADULT - SUBJECTIVE AND OBJECTIVE BOX
CHIEF COMPLAINT: Patient is a 60y old  Female who presents with a chief complaint of Transfer from [facility] (21 Feb 2021 07:01)    SUBJECTIVE: Diarrhea appears to be slowing down.     OBJECTIVE:  ICU Vital Signs Last 24 Hrs  T(C): 36.4 (22 Feb 2021 05:16), Max: 37.2 (21 Feb 2021 22:35)  T(F): 97.6 (22 Feb 2021 05:16), Max: 98.9 (21 Feb 2021 22:35)  HR: 104 (22 Feb 2021 05:16) (80 - 104)  BP: 142/68 (22 Feb 2021 05:16) (123/66 - 142/68)  BP(mean): --  ABP: --  ABP(mean): --  RR: 27 (22 Feb 2021 05:16) (20 - 27)  SpO2: 100% (22 Feb 2021 05:16) (92% - 100%)    Mode: AC/ CMV (Assist Control/ Continuous Mandatory Ventilation), RR (machine): 12, TV (machine): 450, FiO2: 25, PEEP: 5, MAP: 12, PIP: 24    02-21 @ 07:01  -  02-22 @ 07:00  --------------------------------------------------------  IN: 1340 mL / OUT: 1250 mL / NET: 90 mL    CAPILLARY BLOOD GLUCOSE  POCT Blood Glucose.: 170 mg/dL (22 Feb 2021 05:17)    PHYSICAL EXAM:  General: NAD, well groomed. (+) Obese.   HEENT: NC/ AT, PERRLA, Tracheostomy clean, dry and intact.   Cardio: RRR, S1/S2, no murmurs or rubs.   Pulm: CTA with mild upper airway congestion, cleared on suctioning   GI: Soft, NDNT, BS (+). PEG (+) Rectal Tube with improving diarrhea (+)   MS: No pedal edema. AROMI.   Neuro: Awake and alert. Follow commands. No focal neurological deficits noted.   Skin: Warm and dry. No jaundice or cyanosis    HOSPITAL MEDICATIONS:  MEDICATIONS  (STANDING):  ALBUTerol    90 MICROgram(s) HFA Inhaler 2 Puff(s) Inhalation every 6 hours  amLODIPine   Tablet 5 milliGRAM(s) Oral daily  ascorbic acid 500 milliGRAM(s) Oral daily  chlorhexidine 4% Liquid 1 Application(s) Topical daily  clonazePAM  Tablet 0.5 milliGRAM(s) Oral daily  collagenase Ointment 1 Application(s) Topical two times a day  dextrose 40% Gel 15 Gram(s) Oral once  dextrose 5%. 1000 milliLiter(s) (50 mL/Hr) IV Continuous <Continuous>  dextrose 5%. 1000 milliLiter(s) (100 mL/Hr) IV Continuous <Continuous>  dextrose 50% Injectable 25 Gram(s) IV Push once  dextrose 50% Injectable 12.5 Gram(s) IV Push once  dextrose 50% Injectable 25 Gram(s) IV Push once  glucagon  Injectable 1 milliGRAM(s) IntraMuscular once  insulin lispro (ADMELOG) corrective regimen sliding scale   SubCutaneous every 6 hours  lactobacillus acidophilus 1 Tablet(s) Oral two times a day  metoprolol tartrate 50 milliGRAM(s) Oral two times a day  multivitamin 1 Tablet(s) Oral daily  nystatin Powder 1 Application(s) Topical every 8 hours  pantoprazole  Injectable 40 milliGRAM(s) IV Push daily  rivaroxaban 20 milliGRAM(s) Oral with dinner  vancomycin    Solution 250 milliGRAM(s) Oral every 6 hours  zinc sulfate 220 milliGRAM(s) Oral daily    MEDICATIONS  (PRN):  acetaminophen    Suspension .. 650 milliGRAM(s) Oral every 6 hours PRN Temp greater or equal to 38C (100.4F), Mild Pain (1 - 3), Moderate Pain (4 - 6)  artificial tears (preservative free) Ophthalmic Solution 1 Drop(s) Both EYES every 8 hours PRN Dry Eyes  guaiFENesin   Syrup  (Sugar-Free) 100 milliGRAM(s) Oral every 6 hours PRN Cough  oxyCODONE    Solution 5 milliGRAM(s) Oral every 4 hours PRN Severe Pain (7 - 10)    LABS:                        10.2   8.55  )-----------( 215      ( 21 Feb 2021 04:32 )             34.1     02-21    136  |  101  |  8   ----------------------------<  114<H>  3.7   |  20<L>  |  0.26<L>    Ca    9.7      21 Feb 2021 04:32  Phos  4.9     02-21  Mg     1.6     02-21   CHIEF COMPLAINT: Patient is a 60y old  Female who presents with a chief complaint of Transfer from [facility] (21 Feb 2021 07:01)    SUBJECTIVE: Diarrhea appears to be slowing down. Seen by bedside and tolerating TC, but doesn't tolerate PMV second to congestion. ROS limited second poor phonation with tracheostomy.     OBJECTIVE:  ICU Vital Signs Last 24 Hrs  T(C): 36.4 (22 Feb 2021 05:16), Max: 37.2 (21 Feb 2021 22:35)  T(F): 97.6 (22 Feb 2021 05:16), Max: 98.9 (21 Feb 2021 22:35)  HR: 104 (22 Feb 2021 05:16) (80 - 104)  BP: 142/68 (22 Feb 2021 05:16) (123/66 - 142/68)  BP(mean): --  ABP: --  ABP(mean): --  RR: 27 (22 Feb 2021 05:16) (20 - 27)  SpO2: 100% (22 Feb 2021 05:16) (92% - 100%)    Mode: AC/ CMV (Assist Control/ Continuous Mandatory Ventilation), RR (machine): 12, TV (machine): 450, FiO2: 25, PEEP: 5, MAP: 12, PIP: 24    02-21 @ 07:01  -  02-22 @ 07:00  --------------------------------------------------------  IN: 1340 mL / OUT: 1250 mL / NET: 90 mL    CAPILLARY BLOOD GLUCOSE  POCT Blood Glucose.: 170 mg/dL (22 Feb 2021 05:17)    PHYSICAL EXAM:  General: NAD, well groomed. (+) Obese.   HEENT: NC/ AT, PERRLA, Tracheostomy clean, dry and intact.   Cardio: RRR, S1/S2, no murmurs or rubs.   Pulm: CTA with mild upper airway congestion, cleared on suctioning   GI: Soft, NDNT, BS (+). PEG (+) Rectal Tube with improving diarrhea (+)   MS: No pedal edema. AROMI, but limited second to weakness.   Neuro: Awake and alert. Follow commands. No focal neurological deficits noted.   Skin: Warm and dry. No jaundice or cyanosis    HOSPITAL MEDICATIONS:  MEDICATIONS  (STANDING):  ALBUTerol    90 MICROgram(s) HFA Inhaler 2 Puff(s) Inhalation every 6 hours  amLODIPine   Tablet 5 milliGRAM(s) Oral daily  ascorbic acid 500 milliGRAM(s) Oral daily  chlorhexidine 4% Liquid 1 Application(s) Topical daily  clonazePAM  Tablet 0.5 milliGRAM(s) Oral daily  collagenase Ointment 1 Application(s) Topical two times a day  dextrose 40% Gel 15 Gram(s) Oral once  dextrose 5%. 1000 milliLiter(s) (50 mL/Hr) IV Continuous <Continuous>  dextrose 5%. 1000 milliLiter(s) (100 mL/Hr) IV Continuous <Continuous>  dextrose 50% Injectable 25 Gram(s) IV Push once  dextrose 50% Injectable 12.5 Gram(s) IV Push once  dextrose 50% Injectable 25 Gram(s) IV Push once  glucagon  Injectable 1 milliGRAM(s) IntraMuscular once  insulin lispro (ADMELOG) corrective regimen sliding scale   SubCutaneous every 6 hours  lactobacillus acidophilus 1 Tablet(s) Oral two times a day  metoprolol tartrate 50 milliGRAM(s) Oral two times a day  multivitamin 1 Tablet(s) Oral daily  nystatin Powder 1 Application(s) Topical every 8 hours  pantoprazole  Injectable 40 milliGRAM(s) IV Push daily  rivaroxaban 20 milliGRAM(s) Oral with dinner  vancomycin    Solution 250 milliGRAM(s) Oral every 6 hours  zinc sulfate 220 milliGRAM(s) Oral daily    MEDICATIONS  (PRN):  acetaminophen    Suspension .. 650 milliGRAM(s) Oral every 6 hours PRN Temp greater or equal to 38C (100.4F), Mild Pain (1 - 3), Moderate Pain (4 - 6)  artificial tears (preservative free) Ophthalmic Solution 1 Drop(s) Both EYES every 8 hours PRN Dry Eyes  guaiFENesin   Syrup  (Sugar-Free) 100 milliGRAM(s) Oral every 6 hours PRN Cough  oxyCODONE    Solution 5 milliGRAM(s) Oral every 4 hours PRN Severe Pain (7 - 10)    LABS:                        10.2   8.55  )-----------( 215      ( 21 Feb 2021 04:32 )             34.1     02-21    136  |  101  |  8   ----------------------------<  114<H>  3.7   |  20<L>  |  0.26<L>    Ca    9.7      21 Feb 2021 04:32  Phos  4.9     02-21  Mg     1.6     02-21

## 2021-02-23 LAB
ANION GAP SERPL CALC-SCNC: 12 MMOL/L — SIGNIFICANT CHANGE UP (ref 7–14)
BUN SERPL-MCNC: 12 MG/DL — SIGNIFICANT CHANGE UP (ref 7–23)
CALCIUM SERPL-MCNC: 9.9 MG/DL — SIGNIFICANT CHANGE UP (ref 8.4–10.5)
CHLORIDE SERPL-SCNC: 102 MMOL/L — SIGNIFICANT CHANGE UP (ref 98–107)
CO2 SERPL-SCNC: 21 MMOL/L — LOW (ref 22–31)
CREAT SERPL-MCNC: 0.29 MG/DL — LOW (ref 0.5–1.3)
GLUCOSE BLDC GLUCOMTR-MCNC: 139 MG/DL — HIGH (ref 70–99)
GLUCOSE BLDC GLUCOMTR-MCNC: 140 MG/DL — HIGH (ref 70–99)
GLUCOSE BLDC GLUCOMTR-MCNC: 153 MG/DL — HIGH (ref 70–99)
GLUCOSE BLDC GLUCOMTR-MCNC: 154 MG/DL — HIGH (ref 70–99)
GLUCOSE SERPL-MCNC: 140 MG/DL — HIGH (ref 70–99)
HCT VFR BLD CALC: 39.2 % — SIGNIFICANT CHANGE UP (ref 34.5–45)
HGB BLD-MCNC: 11.6 G/DL — SIGNIFICANT CHANGE UP (ref 11.5–15.5)
MAGNESIUM SERPL-MCNC: 1.6 MG/DL — SIGNIFICANT CHANGE UP (ref 1.6–2.6)
MCHC RBC-ENTMCNC: 27.3 PG — SIGNIFICANT CHANGE UP (ref 27–34)
MCHC RBC-ENTMCNC: 29.6 GM/DL — LOW (ref 32–36)
MCV RBC AUTO: 92.2 FL — SIGNIFICANT CHANGE UP (ref 80–100)
NRBC # BLD: 0 /100 WBCS — SIGNIFICANT CHANGE UP
NRBC # FLD: 0 K/UL — SIGNIFICANT CHANGE UP
PHOSPHATE SERPL-MCNC: 3.8 MG/DL — SIGNIFICANT CHANGE UP (ref 2.5–4.5)
PLATELET # BLD AUTO: 386 K/UL — SIGNIFICANT CHANGE UP (ref 150–400)
POTASSIUM SERPL-MCNC: 3.3 MMOL/L — LOW (ref 3.5–5.3)
POTASSIUM SERPL-SCNC: 3.3 MMOL/L — LOW (ref 3.5–5.3)
RBC # BLD: 4.25 M/UL — SIGNIFICANT CHANGE UP (ref 3.8–5.2)
RBC # FLD: 16.1 % — HIGH (ref 10.3–14.5)
SODIUM SERPL-SCNC: 135 MMOL/L — SIGNIFICANT CHANGE UP (ref 135–145)
WBC # BLD: 9.01 K/UL — SIGNIFICANT CHANGE UP (ref 3.8–10.5)
WBC # FLD AUTO: 9.01 K/UL — SIGNIFICANT CHANGE UP (ref 3.8–10.5)

## 2021-02-23 PROCEDURE — 99233 SBSQ HOSP IP/OBS HIGH 50: CPT | Mod: 25

## 2021-02-23 RX ORDER — MAGNESIUM SULFATE 500 MG/ML
1 VIAL (ML) INJECTION ONCE
Refills: 0 | Status: COMPLETED | OUTPATIENT
Start: 2021-02-23 | End: 2021-02-23

## 2021-02-23 RX ORDER — POTASSIUM CHLORIDE 20 MEQ
40 PACKET (EA) ORAL ONCE
Refills: 0 | Status: COMPLETED | OUTPATIENT
Start: 2021-02-23 | End: 2021-02-23

## 2021-02-23 RX ORDER — VANCOMYCIN HCL 1 G
250 VIAL (EA) INTRAVENOUS EVERY 6 HOURS
Refills: 0 | Status: DISCONTINUED | OUTPATIENT
Start: 2021-02-23 | End: 2021-02-24

## 2021-02-23 RX ADMIN — Medication 40 MILLIEQUIVALENT(S): at 12:33

## 2021-02-23 RX ADMIN — NYSTATIN CREAM 1 APPLICATION(S): 100000 CREAM TOPICAL at 05:13

## 2021-02-23 RX ADMIN — RIVAROXABAN 20 MILLIGRAM(S): KIT at 17:29

## 2021-02-23 RX ADMIN — CHLORHEXIDINE GLUCONATE 1 APPLICATION(S): 213 SOLUTION TOPICAL at 12:34

## 2021-02-23 RX ADMIN — Medication 2: at 12:32

## 2021-02-23 RX ADMIN — Medication 50 MILLIGRAM(S): at 05:15

## 2021-02-23 RX ADMIN — AMLODIPINE BESYLATE 5 MILLIGRAM(S): 2.5 TABLET ORAL at 05:13

## 2021-02-23 RX ADMIN — OXYCODONE HYDROCHLORIDE 5 MILLIGRAM(S): 5 TABLET ORAL at 20:25

## 2021-02-23 RX ADMIN — Medication 100 GRAM(S): at 12:33

## 2021-02-23 RX ADMIN — Medication 1 TABLET(S): at 12:33

## 2021-02-23 RX ADMIN — Medication 2: at 05:25

## 2021-02-23 RX ADMIN — Medication 250 MILLIGRAM(S): at 17:29

## 2021-02-23 RX ADMIN — PANTOPRAZOLE SODIUM 40 MILLIGRAM(S): 20 TABLET, DELAYED RELEASE ORAL at 12:34

## 2021-02-23 RX ADMIN — Medication 250 MILLIGRAM(S): at 12:33

## 2021-02-23 RX ADMIN — Medication 250 MILLIGRAM(S): at 23:17

## 2021-02-23 RX ADMIN — Medication 0.5 MILLIGRAM(S): at 12:33

## 2021-02-23 RX ADMIN — ALBUTEROL 2 PUFF(S): 90 AEROSOL, METERED ORAL at 15:16

## 2021-02-23 RX ADMIN — NYSTATIN CREAM 1 APPLICATION(S): 100000 CREAM TOPICAL at 12:34

## 2021-02-23 RX ADMIN — Medication 1 TABLET(S): at 17:29

## 2021-02-23 RX ADMIN — Medication 1 APPLICATION(S): at 17:29

## 2021-02-23 RX ADMIN — ALBUTEROL 2 PUFF(S): 90 AEROSOL, METERED ORAL at 04:56

## 2021-02-23 RX ADMIN — Medication 1 TABLET(S): at 05:13

## 2021-02-23 RX ADMIN — ZINC SULFATE TAB 220 MG (50 MG ZINC EQUIVALENT) 220 MILLIGRAM(S): 220 (50 ZN) TAB at 12:33

## 2021-02-23 RX ADMIN — Medication 50 MILLIGRAM(S): at 17:29

## 2021-02-23 RX ADMIN — ALBUTEROL 2 PUFF(S): 90 AEROSOL, METERED ORAL at 10:06

## 2021-02-23 RX ADMIN — Medication 500 MILLIGRAM(S): at 12:33

## 2021-02-23 RX ADMIN — NYSTATIN CREAM 1 APPLICATION(S): 100000 CREAM TOPICAL at 21:11

## 2021-02-23 RX ADMIN — Medication 1 APPLICATION(S): at 05:13

## 2021-02-23 RX ADMIN — ALBUTEROL 2 PUFF(S): 90 AEROSOL, METERED ORAL at 22:43

## 2021-02-23 NOTE — PROGRESS NOTE ADULT - EXTREMITIES
detailed exam
No cyanosis, clubbing or edema
detailed exam
No cyanosis, clubbing or edema
detailed exam
No cyanosis, clubbing or edema
detailed exam

## 2021-02-23 NOTE — PROGRESS NOTE ADULT - ASSESSMENT
59 YO F with PMHx of HTN, DVT on Xarelto, Peritonitis s/p Oral's Procedure and Ileostomy (reversed in 2011) and AARON who presented to [facility] on 11/18 for AHRF second to COVID 19, intubated on 11/23 complicated by R lung abscesses on CT CHEST 12/5, multibacterial PNA and bacteremia, ARTEMIO s/p CRRT and HD, and s/p Tracheostomy 12/18 c/b track leak and s/p OR 8Bivona. Transferred to [facility] for furhter management.     # AMS/ Anxiety   - AOX3 at baseline.   - CTH and EEG WNL   - Anxiety noted and Klonopin started.   - Supportive care.     # ARDS second to COVID vs Multi-bacterial PNA/ R Lung Abscess  - Intubated 11/23 and c/b multi-bacterial PNA and lung abscesses.   - s/p Tracheostomy on 12/18 by CTSx. Sutures out 1/1  - Air leak noted and s/p Trach Exchange for Bivona with CTSx 12/31  - Tolerating TC with PMV QD and Vent QHS .   - Proventil, IPV and Chest PT Q6H and Suction PRN. Trach Care QD.     # Vasoplegic vs Septic Shock   - Continue on Norvasc and Metoprolol.   - On full AC for Hx of DVT. CTA CHEST with no PEs.     # ARTEMIO s/p CRRT and HD   - ARTEMIO now resolved and no longer required HD.   - Hypokalemia in setting of diarrhea/ CDIFF. Monitor electrolytes.   - Monitor renal function and avoid nephrotoxins.     # GI   - s/p PEG placement with IR 1/11   - Continue on TF and monitor tolerance.     # Sepsis second to COVID vs Multi-bacterial PNA/ Bacteremia and R Lung Abscess vs CDIFF (+)   - COVID with superimposed multi-bacterial VAP vs aspiration PNA vs cavitary PNA.   - SCx (11/24) MSSA and BCx (11/27) with MSSA and Proteus Bacteremia  - SCx (12/1, 12/12 and 12/27) with Stenotrophomonas.   - SCx (1/9) with  Pseudomonas and Stenotrophomonas   - BCX has been persistently negative from 12/1, and most recently 1/12   - CT CHEST 12/4 with right lung cavitation.   - s/p multiple antibiotics, but now completed Fortaz and Flagyl (1/12-1/16)  - RPT CT CHEST 1/28 with improvement of right lung abscess   - SCx with  Pseudomonas and Stenotrophomonas and s/p TED/ Fortaz (1/28-2/6)    # CDIFF (+)   - Started on PO Vancomycin 2/1, then increased to high dose 2/12-2/22  - Questran daily with improved stools.   - ID following     # DM2   - Continue on ISS and NPH and monitor FS. Adjust insulin as needed.     # Hx of DVT   - Continue on Xarelto.     # Skin/ Facial Wounds   - WOC recommendations appreciated   - Plastics evaluation appreciated    # Contracted Wrists   - PT/OT working on strength.     # DVT/ GI PPX with FULL AC/ PPI   # CODE STATUS - FULL CODE   # DISPO - PT recc Rehab/ Vent Facility  61 YO F with PMHx of HTN, DVT on Xarelto, Peritonitis s/p Oral's Procedure and Ileostomy (reversed in 2011) and AARON who presented to [facility] on 11/18 for AHRF second to COVID 19, intubated on 11/23 complicated by R lung abscesses on CT CHEST 12/5, multibacterial PNA and bacteremia, ARTEMIO s/p CRRT and HD, and s/p Tracheostomy 12/18 c/b track leak and s/p OR 8Bivona. Transferred to [facility] for furhter management.     # AMS/ Anxiety   - AOX3 at baseline.   - CTH and EEG WNL   - Anxiety noted and Klonopin started.   - Supportive care.     # ARDS second to COVID vs Multi-bacterial PNA/ R Lung Abscess  - Intubated 11/23 and c/b multi-bacterial PNA and lung abscesses.   - s/p Tracheostomy on 12/18 by CTSx. Sutures out 1/1  - Air leak noted and s/p Trach Exchange for Bivona with CTSx 12/31  - Tolerating TC with PMV QD and Vent QHS .   - Proventil, IPV and Chest PT Q6H and Suction PRN. Trach Care QD.     # Vasoplegic vs Septic Shock   - Continue on Norvasc and Metoprolol.   - On full AC for Hx of DVT. CTA CHEST with no PEs.     # ARTEMIO s/p CRRT and HD   - ARTEMIO now resolved and no longer required HD.   - Hypokalemia in setting of diarrhea/ CDIFF. Monitor electrolytes.   - Monitor renal function and avoid nephrotoxins.     # GI   - s/p PEG placement with IR 1/11   - Continue on TF and monitor tolerance.     # Sepsis second to COVID vs Multi-bacterial PNA/ Bacteremia and R Lung Abscess vs CDIFF (+)   - COVID with superimposed multi-bacterial VAP vs aspiration PNA vs cavitary PNA.   - SCx (11/24) MSSA and BCx (11/27) with MSSA and Proteus Bacteremia  - SCx (12/1, 12/12 and 12/27) with Stenotrophomonas.   - SCx (1/9) with  Pseudomonas and Stenotrophomonas   - BCX has been persistently negative from 12/1, and most recently 1/12   - CT CHEST 12/4 with right lung cavitation.   - s/p multiple antibiotics, but now completed Fortaz and Flagyl (1/12-1/16)  - RPT CT CHEST 1/28 with improvement of right lung abscess   - SCx with  Pseudomonas and Stenotrophomonas and s/p TED/ Fortaz (1/28-2/6)  - ID reconsulted 2/23 and plan to see pt tomorrow 2/24 but can continue Vanc for now.     # CDIFF (+)   - Started on PO Vancomycin 2/1, then increased to high dose 2/12-2/22  - Questran daily with improved stools.   - ID following     # DM2   - Continue on ISS and NPH and monitor FS. Adjust insulin as needed.     # Hx of DVT   - Continue on Xarelto.     # Skin/ Facial Wounds   - WOC recommendations appreciated   - Plastics evaluation appreciated    # Contracted Wrists   - PT/OT working on strength.     # DVT/ GI PPX with FULL AC/ PPI   # CODE STATUS - FULL CODE   # DISPO - PT recc Rehab/ Vent Facility

## 2021-02-23 NOTE — PROGRESS NOTE ADULT - ATTENDING COMMENTS
Agree with plan as outlined above. Patient seen and examined at bedside. Patient history, laboratory data, and imaging personally reviewed.    Pt is a 60F with PMHx as above presenting to [facility] for hypoxemic respiratory failure with ARDS 2/2 COVID19 PNA further c/b superimposed MSSA cavitary PNA and ARF requiring HD. Pt with failure to wean from mechanical ventilation, s/p tracheostomy placement 12/18 and transfer to [facility] 12/28.     Pt now with significantly improving encephalopathy, able to communicate effectively and answer questions/follow commands. Remains off all IV sedation since 12/24. CT Head (-) 12/12. EEG (-). No indication for further neurologic w/u at this time given clinical improvement. OOB to chair as tolerated. Moves all 4 extremities independently, but remains physically severely deconditioned and dependent of ADLs likely 2/2 critical illness polyneuropathy.     Pt with trach placement (CTSx, 12/18 Proximal 7XLT and then changed to 8Fr Bivona 12/31 for persistent air leak), now with resolution of air leak and significant improvement in ventilator synchrony. Daily SBTs as tolerated. Pt tolerating trach collar QD, PSV qhs. Pt able to tolerate PMV intermittently, but is not independent in use. Will c/w airway clearance therapy and trach care as per RCU team.      Pt initially p/w acute hypoxemic respiratory failure 2/2 COVID19 PNA but with hospital course c/b MSSA bacteremia with cavitary PNA s/p completion of Abx with Primaxin on 12/28 followed by course of Ceftazidime through 1/5 for Stenotrophomonas PNA followed by VAP 2/2 Stenotrophomonas and Pseudomonas 1/12 s/p completion of Flagyl+Ceftazidime+Vancomycin with short-term clinical improvement followed by recurrent of VAP 2/2 CRE Pseudomonas/Stenotrophomonas, s/p eradication therapy with IV+Nebulized Abx 1/28-2/6. Repeat CT Chest performed 12/4 showed significantly improved cavitary lesions from prior MSSA PNA without area requiring further source control. Hospital course then c/b CDiff colitis 2/1, now on PO Vancomycin with improvement in diarrhea but persistent loose stools.     Pt with oropharyngeal dysphagia, IR guided PEG successfully placed 1/11. Pt tolerating PEG feeds. Pt also initially with ARF on CRRT, following by intermittent HD (last session 12/17) with renal recovery. NPH+HISS for glucose control. Wound consult and plastic sx recs appreciated.    Dispo pending medical optimization, likely ROJELIO.

## 2021-02-23 NOTE — PROGRESS NOTE ADULT - SUBJECTIVE AND OBJECTIVE BOX
CHIEF COMPLAINT: Patient is a 60y old Female who presents with a chief complaint of Transfer from [facility].    Interval Events:    REVIEW OF SYSTEMS:  [ ] All other systems negative  [ ] Unable to assess ROS because ________    OBJECTIVE:  ICU Vital Signs Last 24 Hrs  T(C): 37.2 (23 Feb 2021 05:05), Max: 37.2 (23 Feb 2021 05:05)  T(F): 98.9 (23 Feb 2021 05:05), Max: 98.9 (23 Feb 2021 05:05)  HR: 110 (23 Feb 2021 05:05) (10 - 110)  BP: 130/63 (23 Feb 2021 05:05) (130/63 - 154/64)  RR: 28 (23 Feb 2021 05:05) (19 - 28)  SpO2: 98% (23 Feb 2021 05:05) (97% - 99%)    Mode: AC/ CMV (Assist Control/ Continuous Mandatory Ventilation), RR (machine): 12, TV (machine): 450, FiO2: 25, PEEP: 5, MAP: 10, PIP: 20    02-22 @ 07:01  -  02-23 @ 07:00  --------------------------------------------------------  IN: 1340 mL / OUT: 1200 mL / NET: 140 mL      CAPILLARY BLOOD GLUCOSE      POCT Blood Glucose.: 154 mg/dL (23 Feb 2021 05:23)      HOSPITAL MEDICATIONS:  MEDICATIONS  (STANDING):  ALBUTerol    90 MICROgram(s) HFA Inhaler 2 Puff(s) Inhalation every 6 hours  amLODIPine   Tablet 5 milliGRAM(s) Oral daily  ascorbic acid 500 milliGRAM(s) Oral daily  chlorhexidine 4% Liquid 1 Application(s) Topical daily  clonazePAM  Tablet 0.5 milliGRAM(s) Oral daily  collagenase Ointment 1 Application(s) Topical two times a day  dextrose 40% Gel 15 Gram(s) Oral once  dextrose 5%. 1000 milliLiter(s) (50 mL/Hr) IV Continuous <Continuous>  dextrose 5%. 1000 milliLiter(s) (100 mL/Hr) IV Continuous <Continuous>  dextrose 50% Injectable 25 Gram(s) IV Push once  dextrose 50% Injectable 12.5 Gram(s) IV Push once  dextrose 50% Injectable 25 Gram(s) IV Push once  glucagon  Injectable 1 milliGRAM(s) IntraMuscular once  insulin lispro (ADMELOG) corrective regimen sliding scale   SubCutaneous every 6 hours  lactobacillus acidophilus 1 Tablet(s) Oral two times a day  metoprolol tartrate 50 milliGRAM(s) Oral two times a day  multivitamin 1 Tablet(s) Oral daily  nystatin Powder 1 Application(s) Topical every 8 hours  pantoprazole  Injectable 40 milliGRAM(s) IV Push daily  rivaroxaban 20 milliGRAM(s) Oral with dinner  zinc sulfate 220 milliGRAM(s) Oral daily    MEDICATIONS  (PRN):  acetaminophen    Suspension .. 650 milliGRAM(s) Oral every 6 hours PRN Temp greater or equal to 38C (100.4F), Mild Pain (1 - 3), Moderate Pain (4 - 6)  artificial tears (preservative free) Ophthalmic Solution 1 Drop(s) Both EYES every 8 hours PRN Dry Eyes  guaiFENesin   Syrup  (Sugar-Free) 100 milliGRAM(s) Oral every 6 hours PRN Cough  oxyCODONE    Solution 5 milliGRAM(s) Oral every 4 hours PRN Severe Pain (7 - 10)      LABS:                        11.6   9.01  )-----------( x        ( 23 Feb 2021 07:02 )             39.2           MICROBIOLOGY:     RADIOLOGY:  [ ] Reviewed and interpreted by me    PULMONARY FUNCTION TESTS:    EKG: CHIEF COMPLAINT: Patient is a 60y old Female who presents with a chief complaint of Transfer from [facility].    Interval Events: No interval events noted overnight.     REVIEW OF SYSTEMS:  Denies fever, chills, SOB, CP, abd pain, N/V  [x] All other systems negative    OBJECTIVE:  ICU Vital Signs Last 24 Hrs  T(C): 37.2 (23 Feb 2021 05:05), Max: 37.2 (23 Feb 2021 05:05)  T(F): 98.9 (23 Feb 2021 05:05), Max: 98.9 (23 Feb 2021 05:05)  HR: 110 (23 Feb 2021 05:05) (10 - 110)  BP: 130/63 (23 Feb 2021 05:05) (130/63 - 154/64)  RR: 28 (23 Feb 2021 05:05) (19 - 28)  SpO2: 98% (23 Feb 2021 05:05) (97% - 99%)    Mode: AC/ CMV (Assist Control/ Continuous Mandatory Ventilation), RR (machine): 12, TV (machine): 450, FiO2: 25, PEEP: 5, MAP: 10, PIP: 20    02-22 @ 07:01  -  02-23 @ 07:00  --------------------------------------------------------  IN: 1340 mL / OUT: 1200 mL / NET: 140 mL      CAPILLARY BLOOD GLUCOSE      POCT Blood Glucose.: 154 mg/dL (23 Feb 2021 05:23)      HOSPITAL MEDICATIONS:  MEDICATIONS  (STANDING):  ALBUTerol    90 MICROgram(s) HFA Inhaler 2 Puff(s) Inhalation every 6 hours  amLODIPine   Tablet 5 milliGRAM(s) Oral daily  ascorbic acid 500 milliGRAM(s) Oral daily  chlorhexidine 4% Liquid 1 Application(s) Topical daily  clonazePAM  Tablet 0.5 milliGRAM(s) Oral daily  collagenase Ointment 1 Application(s) Topical two times a day  dextrose 40% Gel 15 Gram(s) Oral once  dextrose 5%. 1000 milliLiter(s) (50 mL/Hr) IV Continuous <Continuous>  dextrose 5%. 1000 milliLiter(s) (100 mL/Hr) IV Continuous <Continuous>  dextrose 50% Injectable 25 Gram(s) IV Push once  dextrose 50% Injectable 12.5 Gram(s) IV Push once  dextrose 50% Injectable 25 Gram(s) IV Push once  glucagon  Injectable 1 milliGRAM(s) IntraMuscular once  insulin lispro (ADMELOG) corrective regimen sliding scale   SubCutaneous every 6 hours  lactobacillus acidophilus 1 Tablet(s) Oral two times a day  metoprolol tartrate 50 milliGRAM(s) Oral two times a day  multivitamin 1 Tablet(s) Oral daily  nystatin Powder 1 Application(s) Topical every 8 hours  pantoprazole  Injectable 40 milliGRAM(s) IV Push daily  rivaroxaban 20 milliGRAM(s) Oral with dinner  zinc sulfate 220 milliGRAM(s) Oral daily    MEDICATIONS  (PRN):  acetaminophen    Suspension .. 650 milliGRAM(s) Oral every 6 hours PRN Temp greater or equal to 38C (100.4F), Mild Pain (1 - 3), Moderate Pain (4 - 6)  artificial tears (preservative free) Ophthalmic Solution 1 Drop(s) Both EYES every 8 hours PRN Dry Eyes  guaiFENesin   Syrup  (Sugar-Free) 100 milliGRAM(s) Oral every 6 hours PRN Cough  oxyCODONE    Solution 5 milliGRAM(s) Oral every 4 hours PRN Severe Pain (7 - 10)      LABS:                        11.6   9.01  )-----------( x        ( 23 Feb 2021 07:02 )             39.2       02-23    135  |  102  |  12  ----------------------------<  140<H>  3.3<L>   |  21<L>  |  0.29<L>    Ca    9.9      23 Feb 2021 07:02  Phos  3.8     02-23  Mg     1.6     02-23    MICROBIOLOGY:     RADIOLOGY:  [ ] Reviewed and interpreted by me    PULMONARY FUNCTION TESTS:    EKG:

## 2021-02-24 ENCOUNTER — TRANSCRIPTION ENCOUNTER (OUTPATIENT)
Age: 61
End: 2021-02-24

## 2021-02-24 LAB
GLUCOSE BLDC GLUCOMTR-MCNC: 142 MG/DL — HIGH (ref 70–99)
GLUCOSE BLDC GLUCOMTR-MCNC: 143 MG/DL — HIGH (ref 70–99)
GLUCOSE BLDC GLUCOMTR-MCNC: 144 MG/DL — HIGH (ref 70–99)
GLUCOSE BLDC GLUCOMTR-MCNC: 158 MG/DL — HIGH (ref 70–99)

## 2021-02-24 PROCEDURE — 99233 SBSQ HOSP IP/OBS HIGH 50: CPT | Mod: 25

## 2021-02-24 PROCEDURE — 99232 SBSQ HOSP IP/OBS MODERATE 35: CPT

## 2021-02-24 RX ORDER — VANCOMYCIN HCL 1 G
125 VIAL (EA) INTRAVENOUS EVERY 6 HOURS
Refills: 0 | Status: DISCONTINUED | OUTPATIENT
Start: 2021-02-24 | End: 2021-02-25

## 2021-02-24 RX ADMIN — Medication 1 TABLET(S): at 17:55

## 2021-02-24 RX ADMIN — Medication 500 MILLIGRAM(S): at 12:21

## 2021-02-24 RX ADMIN — ALBUTEROL 2 PUFF(S): 90 AEROSOL, METERED ORAL at 09:48

## 2021-02-24 RX ADMIN — RIVAROXABAN 20 MILLIGRAM(S): KIT at 17:57

## 2021-02-24 RX ADMIN — NYSTATIN CREAM 1 APPLICATION(S): 100000 CREAM TOPICAL at 20:17

## 2021-02-24 RX ADMIN — Medication 250 MILLIGRAM(S): at 05:59

## 2021-02-24 RX ADMIN — NYSTATIN CREAM 1 APPLICATION(S): 100000 CREAM TOPICAL at 05:58

## 2021-02-24 RX ADMIN — OXYCODONE HYDROCHLORIDE 5 MILLIGRAM(S): 5 TABLET ORAL at 06:17

## 2021-02-24 RX ADMIN — Medication 0.5 MILLIGRAM(S): at 12:21

## 2021-02-24 RX ADMIN — ALBUTEROL 2 PUFF(S): 90 AEROSOL, METERED ORAL at 04:17

## 2021-02-24 RX ADMIN — ALBUTEROL 2 PUFF(S): 90 AEROSOL, METERED ORAL at 15:06

## 2021-02-24 RX ADMIN — Medication 1 TABLET(S): at 05:59

## 2021-02-24 RX ADMIN — Medication 250 MILLIGRAM(S): at 17:55

## 2021-02-24 RX ADMIN — ALBUTEROL 2 PUFF(S): 90 AEROSOL, METERED ORAL at 21:23

## 2021-02-24 RX ADMIN — OXYCODONE HYDROCHLORIDE 5 MILLIGRAM(S): 5 TABLET ORAL at 20:12

## 2021-02-24 RX ADMIN — Medication 50 MILLIGRAM(S): at 17:55

## 2021-02-24 RX ADMIN — Medication 1 APPLICATION(S): at 05:58

## 2021-02-24 RX ADMIN — Medication 2: at 23:41

## 2021-02-24 RX ADMIN — CHLORHEXIDINE GLUCONATE 1 APPLICATION(S): 213 SOLUTION TOPICAL at 12:23

## 2021-02-24 RX ADMIN — AMLODIPINE BESYLATE 5 MILLIGRAM(S): 2.5 TABLET ORAL at 05:59

## 2021-02-24 RX ADMIN — Medication 250 MILLIGRAM(S): at 12:22

## 2021-02-24 RX ADMIN — PANTOPRAZOLE SODIUM 40 MILLIGRAM(S): 20 TABLET, DELAYED RELEASE ORAL at 12:22

## 2021-02-24 RX ADMIN — Medication 1 APPLICATION(S): at 17:55

## 2021-02-24 RX ADMIN — Medication 125 MILLIGRAM(S): at 23:41

## 2021-02-24 RX ADMIN — OXYCODONE HYDROCHLORIDE 5 MILLIGRAM(S): 5 TABLET ORAL at 14:17

## 2021-02-24 RX ADMIN — Medication 50 MILLIGRAM(S): at 05:59

## 2021-02-24 RX ADMIN — Medication 1 TABLET(S): at 12:21

## 2021-02-24 RX ADMIN — ZINC SULFATE TAB 220 MG (50 MG ZINC EQUIVALENT) 220 MILLIGRAM(S): 220 (50 ZN) TAB at 12:21

## 2021-02-24 RX ADMIN — NYSTATIN CREAM 1 APPLICATION(S): 100000 CREAM TOPICAL at 12:22

## 2021-02-24 RX ADMIN — Medication 125 MILLIGRAM(S): at 18:20

## 2021-02-24 NOTE — PROGRESS NOTE ADULT - ATTENDING COMMENTS
Agree with plan as outlined above. Patient seen and examined at bedside. Patient history, laboratory data, and imaging personally reviewed.    Pt is a 60F with PMHx as above presenting to [facility] for hypoxemic respiratory failure with ARDS 2/2 COVID19 PNA further c/b superimposed MSSA cavitary PNA and ARF requiring HD. Pt with failure to wean from mechanical ventilation, s/p tracheostomy placement 12/18 and transfer to RCU 12/28.     Pt now with significantly improving encephalopathy, able to communicate effectively and answer questions/follow commands. Remains off all IV sedation since 12/24. CT Head (-) 12/12. EEG (-). No indication for further neurologic w/u at this time given clinical improvement. OOB to chair as tolerated. Moves all 4 extremities independently, but remains physically severely deconditioned and dependent of ADLs likely 2/2 critical illness polyneuropathy.     Pt with trach placement (CTSx, 12/18 Proximal 7XLT and then changed to 8Fr Bivona 12/31 for persistent air leak), now with resolution of air leak and significant improvement in ventilator synchrony. Daily SBTs as tolerated. Pt tolerating trach collar QD, PSV qhs. Pt able to tolerate PMV intermittently, but is not independent in use. Will c/w airway clearance therapy and trach care as per RCU team.      Pt initially p/w acute hypoxemic respiratory failure 2/2 COVID19 PNA but with hospital course c/b MSSA bacteremia with cavitary PNA s/p completion of Abx with Primaxin on 12/28 followed by course of Ceftazidime through 1/5 for Stenotrophomonas PNA followed by VAP 2/2 Stenotrophomonas and Pseudomonas 1/12 s/p completion of Flagyl+Ceftazidime+Vancomycin with short-term clinical improvement followed by recurrent of VAP 2/2 CRE Pseudomonas/Stenotrophomonas, s/p eradication therapy with IV+Nebulized Abx 1/28-2/6. Repeat CT Chest performed 12/4 showed significantly improved cavitary lesions from prior MSSA PNA without area requiring further source control. Hospital course then c/b CDiff colitis 2/1, now on PO Vancomycin with improvement in diarrhea but persistent loose stools.     Pt with oropharyngeal dysphagia, IR guided PEG successfully placed 1/11. Pt tolerating PEG feeds. Pt also initially with ARF on CRRT, following by intermittent HD (last session 12/17) with renal recovery. NPH+HISS for glucose control. Wound consult and plastic sx recs appreciated.    Dispo pending medical optimization, likely ROJELIO.

## 2021-02-24 NOTE — PROGRESS NOTE ADULT - SUBJECTIVE AND OBJECTIVE BOX
Follow Up:      Inverval History/ROS:Patient is a 60y old  Female who presents with a chief complaint of Transfer from [facility] (22 Dec 2020 08:13)    asked to re-evaluate for persistent diarrhea.  has been on vanco po 2/1-        Allergies    Kiwi (Unknown)  latex (Anaphylaxis)  latex (Unknown)  penicillin (Other)  penicillin (Unknown)  perfume  hives (Other)  potassium acetate (Other)  soap additives hives (Other)    Intolerances    ANTIMICROBIALS: vancomycin    Solution 250 every 6 hours    MEDICATIONS  (STANDING):  acetaminophen    Suspension .. 650 every 6 hours PRN  ALBUTerol    90 MICROgram(s) HFA Inhaler 2 every 6 hours  amLODIPine   Tablet 5 daily  clonazePAM  Tablet 0.5 daily  dextrose 40% Gel 15 once  dextrose 50% Injectable 25 once  dextrose 50% Injectable 12.5 once  dextrose 50% Injectable 25 once  glucagon  Injectable 1 once  guaiFENesin   Syrup  (Sugar-Free) 100 every 6 hours PRN  insulin lispro (ADMELOG) corrective regimen sliding scale  every 6 hours  metoprolol tartrate 50 two times a day  oxyCODONE    Solution 5 every 4 hours PRN  pantoprazole  Injectable 40 daily  rivaroxaban 20 with dinner    Vital Signs Last 24 Hrs  T(F): 98.4 (02-24-21 @ 12:34), Max: 99.1 (02-23-21 @ 21:09)  HR: 100 (02-24-21 @ 15:02)  BP: 154/59 (02-24-21 @ 12:34)  RR: 19 (02-24-21 @ 15:01)  SpO2: 100% (02-24-21 @ 15:02) (96% - 100%)    PHYSICAL EXAM:  General: [x ] trach,  communicative  ENT:  eschar chin   Cardiovascular:   distant  Respiratory:  coarse   GI:  [x ] soft, scars, hernia, gastrostomy tube, rectal tube liquid stool  :  [ ] negro   Neurologic: moves all extremities  Skin:   no rash  Psychiatric: alert, awake  Lines: peripheral                                     11.6   9.01  )-----------( 386      ( 23 Feb 2021 07:02 )             39.2 02-23    135  |  102  |  12  ----------------------------<  140  3.3   |  21  |  0.29  Ca    9.9      23 Feb 2021 07:02Phos  3.8     02-23Mg     1.6     02-23          MICROBIOLOGY:    cClostridium difficile Toxin by PCR (02.01.21 @ 00:41)   C Diff by PCR Result: Detected   Clostridium difficile Toxin by PCR:  1/25 blood culture x 2 no growth  1/25 sputum   Specimen Source: .Sputum Sputum   Culture Results:   Numerous Pseudomonas aeruginosa (Carbapenem Resistant)   Numerous Stenotrophomonas maltophilia   Normal Respiratory Jocelynn absent   Organism Identification: Pseudomonas aeruginosa (Carbapenem Resistant)   Organism: Pseudomonas aeruginosa (Carbapenem Resistant)   Method Type: LONG   ulCOVID-19 PCR . (01.24.21 @ 15:01)   COVID-19 PCR: NotDetec: Culture - Blood (01.12.21 @ 18:55)   Specimen Source: .Blood Blood-Venous   Culture Results:   No Growth Final Culture - Blood (01.12.21 @ 18:50)   Specimen Source: .Blood Blood-Peripheral   Culture Results:   No Growth Final     1/12 blood cultures x 2 no growth to date    cuCulture - Sputum . (01.09.21 @ 18:30)   - Amikacin: S <=16   - Aztreonam: I 16   - Cefepime: S 8   - Ceftazidime: S 4   - Ceftazidime: S 4   - Ciprofloxacin: S 0.5   - Gentamicin: S <=2   - Imipenem: R >8   - Levofloxacin: S 2   - Levofloxacin: S <=0.5   - Meropenem: R >8   - Piperacillin/Tazobactam: S 16   - Tobramycin: S <=2   - Trimethoprim/Sulfamethoxazole: S <=0.5/9.5   Gram Stain:   Few polymorphonuclear leukocytes per low power field   No Squamous epithelial cells per low power field   Numerous Gram Negative Rods seen per oil power field   Specimen Source: .Sputum Sputum   Culture Results:   Numerous Pseudomonas aeruginosa (Carbapenem Resistant)   Numerous Stenotrophomonas maltophilia   Normal Respiratory Jocelynn absent   Organism Identification: Pseudomonas aeruginosa (Carbapenem Resistant)   Stenotrophomonas maltophilia     RADIOLOGY:    none recnet

## 2021-02-24 NOTE — PROGRESS NOTE ADULT - ASSESSMENT
61 yo woman with AARON, h/o Lockett's procedure, s/p reversal 2011 who was admitted to [facility] 11/17 with covid pneumonia.  She received course of remdesevir and dexamethasone; required intubation 11/23 for acute hypoxemic respiratory failure. Hospital course complicated by Proteus bacteremia, MSSA bacteremia/ pneumonia, and Stenotrophomonas respiratory infection, as well as ATN requiring CVVHD --> HD ---> off dialysis.  CT chest 12/4 new large consolidation with cavitation in right lung.  Blood culture 11/27 MSSA and proteus; 11/28 MSSA.     Blood cultures 12/1, 12/11, 12/21, 12/27 no growth.  Sputum culture 12/1, 12/12, 12/27, 12/29, 1/9 Stenotrophomonas.    Multifocal covid pneumonia with respiratory failure, MSSA bacteremia 11/27 with cavitary pneumonia, Proteus bacteremia 11/27, Stenotrophomonas pneumonia.  s/p prolonged antibiotics for bacteremia, pneumonia, aspiration.  s/p trach 12/18.  12/29 Fever, blood tinged sputum and Stenotrophomonas in sputum.  meropenem changed to ceftazidime  --> 1/5 12/31 trach leak.  s/p OR 12/31 for trach change with CT surgery.     1/11 IR gastrostomy placement   1/12 fever pseudomonas and Stenotrophomonas in sputum. received course of ceftazidime, flagyl, vanco.    1/25 fever 102 increased pulmonary secretions with CRE pseudomonas and steno in sputum    1/28 CT chest decrease size lung abscesses   2/1 c dif + vanco started   2/6 completed ceftazidime and inhaled tobra    Diarrhea following 3 months of multiple antibiotics  c dif+ on 2/1  Has remained on vanco po 2/1- present  afebrile with normal WBC    would look for other etiology of diarrhea  check stool GI PCR to look for other GI pathogens  consider GI eval        Mila Burgos MD  Pager: [phone]  After 5 PM or weekends please call fellow on call or office [phone]

## 2021-02-24 NOTE — PROGRESS NOTE ADULT - ASSESSMENT
61 YO F with PMHx of HTN, DVT on Xarelto, Peritonitis s/p Oral's Procedure and Ileostomy (reversed in 2011) and AARON who presented to [facility] on 11/18 for AHRF second to COVID 19, intubated on 11/23 complicated by R lung abscesses on CT CHEST 12/5, multibacterial PNA and bacteremia, ARTEMIO s/p CRRT and HD, and s/p Tracheostomy 12/18 c/b track leak and s/p OR 8Bivona. Transferred to RCU for furhter management.     # AMS/ Anxiety   - AOX3 at baseline.   - CTH and EEG WNL   - Anxiety noted and Klonopin started.   - Supportive care.     # ARDS second to COVID vs Multi-bacterial PNA/ R Lung Abscess  - Intubated 11/23 and c/b multi-bacterial PNA and lung abscesses.   - s/p Tracheostomy on 12/18 by CTSx. Sutures out 1/1  - Air leak noted and s/p Trach Exchange for Bivona with CTSx 12/31  - Tolerating TC with PMV QD and Vent QHS .   - Proventil, IPV and Chest PT Q6H and Suction PRN. Trach Care QD.     # Vasoplegic vs Septic Shock   - Continue on Norvasc and Metoprolol.   - On full AC for Hx of DVT. CTA CHEST with no PEs.     # ARTEMIO s/p CRRT and HD   - ARTEMIO now resolved and no longer required HD.   - Hypokalemia in setting of diarrhea/ CDIFF. Monitor electrolytes.   - Monitor renal function and avoid nephrotoxins.     # GI   - s/p PEG placement with IR 1/11   - Continue on TF and monitor tolerance.     # Sepsis second to COVID vs Multi-bacterial PNA/ Bacteremia and R Lung Abscess vs CDIFF (+)   - COVID with superimposed multi-bacterial VAP vs aspiration PNA vs cavitary PNA.   - SCx (11/24) MSSA and BCx (11/27) with MSSA and Proteus Bacteremia  - SCx (12/1, 12/12 and 12/27) with Stenotrophomonas.   - SCx (1/9) with  Pseudomonas and Stenotrophomonas   - BCX has been persistently negative from 12/1, and most recently 1/12   - CT CHEST 12/4 with right lung cavitation.   - s/p multiple antibiotics, but now completed Fortaz and Flagyl (1/12-1/16)  - RPT CT CHEST 1/28 with improvement of right lung abscess   - SCx with  Pseudomonas and Stenotrophomonas and s/p TED/ Fortaz (1/28-2/6)  - ID reconsulted 2/23 and plan to see pt tomorrow 2/24 but can continue Vanc for now.     # CDIFF (+)   - Started on PO Vancomycin 2/1, then increased to high dose 2/12-2/22  - Questran daily with improved stools.   - ID following     # DM2   - Continue on ISS and NPH and monitor FS. Adjust insulin as needed.     # Hx of DVT   - Continue on Xarelto.     # Skin/ Facial Wounds   - WOC recommendations appreciated   - Plastics evaluation appreciated    # Contracted Wrists   - PT/OT working on strength.     # DVT/ GI PPX with FULL AC/ PPI   # CODE STATUS - FULL CODE   # DISPO - PT recc Rehab/ Vent Facility  61 YO F with PMHx of HTN, DVT on Xarelto, Peritonitis s/p Oral's Procedure and Ileostomy (reversed in 2011) and AARON who presented to [facility] on 11/18 for AHRF second to COVID 19, intubated on 11/23 complicated by R lung abscesses on CT CHEST 12/5, multibacterial PNA and bacteremia, ARTEMIO s/p CRRT and HD, and s/p Tracheostomy 12/18 c/b track leak and s/p OR 8Bivona. Transferred to RCU for furhter management.     # AMS/ Anxiety   - AOX3 at baseline.   - CTH and EEG WNL   - Anxiety noted and Klonopin started.   - Supportive care.     # ARDS second to COVID vs Multi-bacterial PNA/ R Lung Abscess  - Intubated 11/23 and c/b multi-bacterial PNA and lung abscesses.   - s/p Tracheostomy on 12/18 by CTSx. Sutures out 1/1  - Air leak noted and s/p Trach Exchange for Bivona with CTSx 12/31  - Tolerating TC with PMV QD and Vent QHS .   - Proventil, IPV and Chest PT Q6H and Suction PRN. Trach Care QD.     # Vasoplegic vs Septic Shock   - Continue on Norvasc and Metoprolol.   - On full AC for Hx of DVT. CTA CHEST with no PEs.     # ARTEMIO s/p CRRT and HD   - ARTEMIO now resolved and no longer required HD.   - Hypokalemia in setting of diarrhea/ CDIFF. Monitor electrolytes.   - Monitor renal function and avoid nephrotoxins.     # GI   - s/p PEG placement with IR 1/11   - Continue on TF and monitor tolerance.     # Sepsis second to COVID vs Multi-bacterial PNA/ Bacteremia and R Lung Abscess vs CDIFF (+)   - COVID with superimposed multi-bacterial VAP vs aspiration PNA vs cavitary PNA.   - SCx (11/24) MSSA and BCx (11/27) with MSSA and Proteus Bacteremia  - SCx (12/1, 12/12 and 12/27) with Stenotrophomonas.   - SCx (1/9) with  Pseudomonas and Stenotrophomonas   - BCX has been persistently negative from 12/1, and most recently 1/12   - CT CHEST 12/4 with right lung cavitation.   - s/p multiple antibiotics, but now completed Fortaz and Flagyl (1/12-1/16)  - RPT CT CHEST 1/28 with improvement of right lung abscess   - SCx with  Pseudomonas and Stenotrophomonas and s/p TED/ Fortaz (1/28-2/6)  - ID reconsulted 2/23 and plan to see pt 2/24 will continue Vanc for now.     # CDIFF (+)   - Started on PO Vancomycin 2/1, then increased to high dose 250mg 2/12-  - Still with loose stool in flexi 2/24  - ID reconsulted-f/u recs    # DM2   - Continue on ISS and NPH and monitor FS. Adjust insulin as needed.     # Hx of DVT   - Continue on Xarelto.     # Skin/ Facial Wounds   - WOC recommendations appreciated   - Plastics evaluation appreciated    # Contracted Wrists   - PT/OT working on strength.     # DVT/ GI PPX with FULL AC/ PPI   # CODE STATUS - FULL CODE   # DISPO - PT recc Rehab/ Vent Facility  61 YO F with PMHx of HTN, DVT on Xarelto, Peritonitis s/p Oral's Procedure and Ileostomy (reversed in 2011) and AARON who presented to [facility] on 11/18 for AHRF second to COVID 19, intubated on 11/23 complicated by R lung abscesses on CT CHEST 12/5, multibacterial PNA and bacteremia, ARTEMIO s/p CRRT and HD, and s/p Tracheostomy 12/18 c/b track leak and s/p OR 8Bivona. Transferred to RCU for furhter management.     # AMS/ Anxiety   - AOX3 at baseline.   - CTH and EEG WNL   - Anxiety noted and Klonopin started.   - Supportive care.     # ARDS second to COVID vs Multi-bacterial PNA/ R Lung Abscess  - Intubated 11/23 and c/b multi-bacterial PNA and lung abscesses.   - s/p Tracheostomy on 12/18 by CTSx. Sutures out 1/1  - Air leak noted and s/p Trach Exchange for Bivona with CTSx 12/31  - Tolerating TC with PMV QD and Vent QHS .   - Proventil, IPV and Chest PT Q6H and Suction PRN. Trach Care QD.     # Vasoplegic vs Septic Shock   - Continue on Norvasc and Metoprolol.   - On full AC for Hx of DVT. CTA CHEST with no PEs.     # ARTEMIO s/p CRRT and HD   - ARTEMIO now resolved and no longer required HD.   - Hypokalemia in setting of diarrhea/ CDIFF. Monitor electrolytes.   - Monitor renal function and avoid nephrotoxins.     # GI   - s/p PEG placement with IR 1/11   - Continue on TF and monitor tolerance.     # Sepsis second to COVID vs Multi-bacterial PNA/ Bacteremia and R Lung Abscess vs CDIFF (+)   - COVID with superimposed multi-bacterial VAP vs aspiration PNA vs cavitary PNA.   - SCx (11/24) MSSA and BCx (11/27) with MSSA and Proteus Bacteremia  - SCx (12/1, 12/12 and 12/27) with Stenotrophomonas.   - SCx (1/9) with  Pseudomonas and Stenotrophomonas   - BCX has been persistently negative from 12/1, and most recently 1/12   - CT CHEST 12/4 with right lung cavitation.   - s/p multiple antibiotics, but now completed Fortaz and Flagyl (1/12-1/16)  - RPT CT CHEST 1/28 with improvement of right lung abscess   - SCx with  Pseudomonas and Stenotrophomonas and s/p TED/ Fortaz (1/28-2/6)  - ID reconsulted and recommending GI eval for other cause of diarrhea. GI (Branden Ponce) consulted.   - ID recommending to continue Vanc for now but at lower dose.    # CDIFF (+)   - Started on PO Vancomycin 2/1, then increased to high dose 250mg 2/12-  - Still with loose stool in flexi 2/24  - ID reconsulted-f/u recs    # DM2   - Continue on ISS and NPH and monitor FS. Adjust insulin as needed.     # Hx of DVT   - Continue on Xarelto.     # Skin/ Facial Wounds   - WOC recommendations appreciated   - Plastics evaluation appreciated    # Contracted Wrists   - PT/OT working on strength.     # DVT/ GI PPX with FULL AC/ PPI   # CODE STATUS - FULL CODE   # DISPO - PT recc Rehab/ Vent Facility

## 2021-02-24 NOTE — CHART NOTE - NSCHARTNOTEFT_GEN_A_CORE
Nutrition Follow-Up Note   Reason for follow-up: Length of stay follow- up. Medical record reviewed.    Clinical Course history:  Patient with medical history of HTN, DVT on Xarelto, Peritonitis s/p Oral's Procedure and Ileostomy (reversed in 2011) and AARON who presented to [facility] on 11/18 for AHRF second to COVID 19, intubated on 11/23 complicated by R lung abscesses on CT CHEST 12/5, multibacterial PNA and bacteremia, ARTEMIO s/p CRRT and HD, and s/p Tracheostomy 12/18 c/b track leak and s/p OR. Transferred to RCU for further management.     Diet Order: Diet, NPO with Tube Feed:   Tube Feeding Modality: Gastrostomy  Peptamen 1.5 (PEPTAMEN1.5RTH)  Total Volume for 24 Hours (mL): 1080  Continuous  Until Goal Tube Feed Rate (mL per Hour): 45  Tube Feed Duration (in Hours): 24  Tube Feed Start Time: 12:00  No Carb Prosource (1pkg = 15gms Protein)     Qty per Day:  2 (02-16-21 @ 11:49)    CURRENT EN ORDER PROVIDES:  Total Volume: 1080mL/day  Energy: 1620Kcal/day  Protein: 119g protein/day  Free Water: 819mL/day    Nutrition Related Information: - Remains on tube feeds of Peptamen 1.5 - running at goal rate of 45mL/hr.   - No tube feeding intolerances reported.   - Would increase goal rate of feeds slightly to better meet energy needs. Recs to follow below.  - Ordered for multivitamin and on Zinc supplementation. Consider discontinuing Zinc supplementation as patient has been receiving since January (long term high doses may lead to copper deficiency).     GI: - Patient reports left sided abdominal pain today, patient notes medical team informed.  - Denies Nausea/vomiting.  - Continues with loose stools. likely related to +C. Diff 2/01, antibiotic therapy. Continues on peptide-based formula (Peptamen 1.5 to promote improved GI tolerance and nutrient absorption).  - Ordered for probiotic supplementation 2x daily.    Anthropometric Measurements:   Height (cm): 160 (12-10-20 @ 14:29)  Weight (kg): no new weight to assess, 109.2 (2/16, bed-scale weight), 111.5 (2/07), 115 (12/10)  **5.3% weight loss in ~2 months  BMI (kg/m2): 42.7  IBW: 52.3kg +/-10%    Medications: MEDICATIONS  (STANDING):  amLODIPine   Tablet 5 milliGRAM(s) Oral daily  ascorbic acid 500 milliGRAM(s) Oral daily  insulin lispro (ADMELOG) corrective regimen sliding scale   SubCutaneous every 6 hours  lactobacillus acidophilus 1 Tablet(s) Oral two times a day  metoprolol tartrate 50 milliGRAM(s) Oral two times a day  multivitamin 1 Tablet(s) Oral daily  nystatin Powder 1 Application(s) Topical every 8 hours  pantoprazole  Injectable 40 milliGRAM(s) IV Push daily  rivaroxaban 20 milliGRAM(s) Oral with dinner  vancomycin    Solution 250 milliGRAM(s) Oral every 6 hours  zinc sulfate 220 milliGRAM(s) Oral daily    Labs: POCT Blood Glucose.: 144 mg/dL (02-24-21 @ 11:13)  POCT Blood Glucose.: 143 mg/dL (02-24-21 @ 05:03)  POCT Blood Glucose.: 139 mg/dL (02-23-21 @ 23:04)  POCT Blood Glucose.: 140 mg/dL (02-23-21 @ 17:28)    Skin: chin wound  Edema: 1+ generalized    Estimated Nutrition Needs: calculated using most recent weight 109.2kg for energy needs and IBW for protein needs  Estimated Energy Needs (15-18Kcal/kg): 1638-1965Kcal/day  Estimated Protein Needs (2g/kg): 104g protein/day    Previous Nutrition Diagnosis:  Malnutrition, moderate  Diagnosis is ongoing    New Nutrition Diagnosis:      Nutrition Interventions/Recommendations:   1. Continue Peptamen 1.5 via PEG @ 45mL/hr x24 hours + No Carb Prosource 2x daily.  2. Additional free water per physician discretion.  3. Continue multivitamin daily for micronutrient coverage.  4. Continue probiotic supplementation as clinically indicated.  5. Obtain daily weight.     Monitoring and Evaluation:   Monitor nutrition provision, tolerance to diet, weights, labs, skin integrity and GI function.   RD remains available, please consult as needed. Will follow up per protocol.  Xochitl Larson, MS, RD, CDN, [facility] Pager #[phone] Nutrition Follow-Up Note   Reason for follow-up: Length of stay follow- up. Medical record reviewed.    Clinical Course history:  Patient with medical history of HTN, DVT on Xarelto, Peritonitis s/p Oral's Procedure and Ileostomy (reversed in 2011) and AARON who presented to [facility] on 11/18 for AHRF second to COVID 19, intubated on 11/23 complicated by R lung abscesses on CT CHEST 12/5, multibacterial PNA and bacteremia, ARTEMIO s/p CRRT and HD, and s/p Tracheostomy 12/18 c/b track leak and s/p OR. Transferred to RCU for further management.     Diet Order: Diet, NPO with Tube Feed:   Tube Feeding Modality: Gastrostomy  Peptamen 1.5 (PEPTAMEN1.5RTH)  Total Volume for 24 Hours (mL): 1080  Continuous  Until Goal Tube Feed Rate (mL per Hour): 45  Tube Feed Duration (in Hours): 24  Tube Feed Start Time: 12:00  No Carb Prosource (1pkg = 15gms Protein)     Qty per Day:  2 (02-16-21 @ 11:49)    CURRENT EN ORDER PROVIDES:  Total Volume: 1080mL/day  Energy: 1620Kcal/day  Protein: 119g protein/day  Free Water: 819mL/day    Nutrition Related Information: - Remains on tube feeds of Peptamen 1.5 - running at goal rate of 45mL/hr.   - No tube feeding intolerances reported.   - Would increase goal rate of feeds slightly to better meet energy needs. Recs to follow below.  - Ordered for multivitamin and on Zinc supplementation. Consider discontinuing Zinc supplementation as patient has been receiving since January (long term high doses may lead to copper deficiency).     GI: - Patient reports left sided abdominal pain today, patient notes medical team informed.  - Denies Nausea/vomiting.  - Continues with loose stools. likely related to +C. Diff 2/01, antibiotic therapy. Continues on peptide-based formula (Peptamen 1.5 to promote improved GI tolerance and nutrient absorption).  - Ordered for probiotic supplementation 2x daily.    Anthropometric Measurements:   Height (cm): 160 (12-10-20 @ 14:29)  Weight (kg): no new weight to assess, 109.2 (2/16, bed-scale weight), 111.5 (2/07), 115 (12/10)  **5.3% weight loss in ~2 months  BMI (kg/m2): 42.7  IBW: 52.3kg +/-10%    Medications: MEDICATIONS  (STANDING):  amLODIPine   Tablet 5 milliGRAM(s) Oral daily  ascorbic acid 500 milliGRAM(s) Oral daily  insulin lispro (ADMELOG) corrective regimen sliding scale   SubCutaneous every 6 hours  lactobacillus acidophilus 1 Tablet(s) Oral two times a day  metoprolol tartrate 50 milliGRAM(s) Oral two times a day  multivitamin 1 Tablet(s) Oral daily  nystatin Powder 1 Application(s) Topical every 8 hours  pantoprazole  Injectable 40 milliGRAM(s) IV Push daily  rivaroxaban 20 milliGRAM(s) Oral with dinner  vancomycin    Solution 250 milliGRAM(s) Oral every 6 hours  zinc sulfate 220 milliGRAM(s) Oral daily    Labs: POCT Blood Glucose.: 144 mg/dL (02-24-21 @ 11:13)  POCT Blood Glucose.: 143 mg/dL (02-24-21 @ 05:03)  POCT Blood Glucose.: 139 mg/dL (02-23-21 @ 23:04)  POCT Blood Glucose.: 140 mg/dL (02-23-21 @ 17:28)    Skin: chin wound  Edema: 1+ generalized    Estimated Nutrition Needs: calculated using most recent weight 109.2kg for energy needs and IBW for protein needs  Estimated Energy Needs (15-18Kcal/kg): 1638-1965Kcal/day  Estimated Protein Needs (2g/kg): 104g protein/day    Previous Nutrition Diagnosis:  Malnutrition, moderate  Diagnosis is ongoing    New Nutrition Diagnosis:      Nutrition Interventions/Recommendations:   1. Continue Peptamen 1.5 via PEG @ 45mL/hr x24 hours + No Carb Prosource 2x daily.  2. Additional free water per physician discretion.  3. Continue multivitamin daily for micronutrient coverage.  4. Continue probiotic supplementation as clinically indicated.  5. Obtain daily weight.  6. Consider obtaining current Vitamin D level given extended LOS.     Monitoring and Evaluation:   Monitor nutrition provision, tolerance to diet, weights, labs, skin integrity and GI function.   RD remains available, please consult as needed. Will follow up per protocol.  Xochitl Larson, MS, RD, CDN, [facility] Pager #[phone]

## 2021-02-24 NOTE — PROGRESS NOTE ADULT - SUBJECTIVE AND OBJECTIVE BOX
CHIEF COMPLAINT: Patient is a 60y old  Female who presents with a chief complaint of Transfer from [facility].    Interval Events:    REVIEW OF SYSTEMS:  [ ] All other systems negative  [ ] Unable to assess ROS because ________    OBJECTIVE:  ICU Vital Signs Last 24 Hrs  T(C): 36.2 (24 Feb 2021 05:56), Max: 37.3 (23 Feb 2021 12:34)  T(F): 97.1 (24 Feb 2021 05:56), Max: 99.1 (23 Feb 2021 12:34)  HR: 90 (24 Feb 2021 05:56) (85 - 106)  BP: 115/51 (24 Feb 2021 05:56) (115/51 - 147/64)  RR: 24 (24 Feb 2021 05:56) (20 - 26)  SpO2: 99% (24 Feb 2021 05:56) (95% - 100%)    Mode: AC/ CMV (Assist Control/ Continuous Mandatory Ventilation), RR (machine): 12, TV (machine): 450, FiO2: 25, PEEP: 5, MAP: 11.8, PIP: 24    CAPILLARY BLOOD GLUCOSE      POCT Blood Glucose.: 143 mg/dL (24 Feb 2021 05:03)      HOSPITAL MEDICATIONS:  MEDICATIONS  (STANDING):  ALBUTerol    90 MICROgram(s) HFA Inhaler 2 Puff(s) Inhalation every 6 hours  amLODIPine   Tablet 5 milliGRAM(s) Oral daily  ascorbic acid 500 milliGRAM(s) Oral daily  chlorhexidine 4% Liquid 1 Application(s) Topical daily  clonazePAM  Tablet 0.5 milliGRAM(s) Oral daily  collagenase Ointment 1 Application(s) Topical two times a day  dextrose 40% Gel 15 Gram(s) Oral once  dextrose 5%. 1000 milliLiter(s) (50 mL/Hr) IV Continuous <Continuous>  dextrose 5%. 1000 milliLiter(s) (100 mL/Hr) IV Continuous <Continuous>  dextrose 50% Injectable 25 Gram(s) IV Push once  dextrose 50% Injectable 12.5 Gram(s) IV Push once  dextrose 50% Injectable 25 Gram(s) IV Push once  glucagon  Injectable 1 milliGRAM(s) IntraMuscular once  insulin lispro (ADMELOG) corrective regimen sliding scale   SubCutaneous every 6 hours  lactobacillus acidophilus 1 Tablet(s) Oral two times a day  metoprolol tartrate 50 milliGRAM(s) Oral two times a day  multivitamin 1 Tablet(s) Oral daily  nystatin Powder 1 Application(s) Topical every 8 hours  pantoprazole  Injectable 40 milliGRAM(s) IV Push daily  rivaroxaban 20 milliGRAM(s) Oral with dinner  vancomycin    Solution 250 milliGRAM(s) Oral every 6 hours  zinc sulfate 220 milliGRAM(s) Oral daily    MEDICATIONS  (PRN):  acetaminophen    Suspension .. 650 milliGRAM(s) Oral every 6 hours PRN Temp greater or equal to 38C (100.4F), Mild Pain (1 - 3), Moderate Pain (4 - 6)  artificial tears (preservative free) Ophthalmic Solution 1 Drop(s) Both EYES every 8 hours PRN Dry Eyes  guaiFENesin   Syrup  (Sugar-Free) 100 milliGRAM(s) Oral every 6 hours PRN Cough  oxyCODONE    Solution 5 milliGRAM(s) Oral every 4 hours PRN Severe Pain (7 - 10)      LABS:                       MICROBIOLOGY:     RADIOLOGY:  [ ] Reviewed and interpreted by me    PULMONARY FUNCTION TESTS:    EKG: CHIEF COMPLAINT: Patient is a 60y old  Female who presents with a chief complaint of Transfer from [facility].    Interval Events: No interval events noted overnight.     REVIEW OF SYSTEMS:  Complaining of abd pain when coughing. Denies fever, chills, SOB, CP, N/V  [x] All other systems negative    OBJECTIVE:  ICU Vital Signs Last 24 Hrs  T(C): 36.2 (24 Feb 2021 05:56), Max: 37.3 (23 Feb 2021 12:34)  T(F): 97.1 (24 Feb 2021 05:56), Max: 99.1 (23 Feb 2021 12:34)  HR: 90 (24 Feb 2021 05:56) (85 - 106)  BP: 115/51 (24 Feb 2021 05:56) (115/51 - 147/64)  RR: 24 (24 Feb 2021 05:56) (20 - 26)  SpO2: 99% (24 Feb 2021 05:56) (95% - 100%)    Mode: AC/ CMV (Assist Control/ Continuous Mandatory Ventilation), RR (machine): 12, TV (machine): 450, FiO2: 25, PEEP: 5, MAP: 11.8, PIP: 24    CAPILLARY BLOOD GLUCOSE      POCT Blood Glucose.: 143 mg/dL (24 Feb 2021 05:03)      HOSPITAL MEDICATIONS:  MEDICATIONS  (STANDING):  ALBUTerol    90 MICROgram(s) HFA Inhaler 2 Puff(s) Inhalation every 6 hours  amLODIPine   Tablet 5 milliGRAM(s) Oral daily  ascorbic acid 500 milliGRAM(s) Oral daily  chlorhexidine 4% Liquid 1 Application(s) Topical daily  clonazePAM  Tablet 0.5 milliGRAM(s) Oral daily  collagenase Ointment 1 Application(s) Topical two times a day  dextrose 40% Gel 15 Gram(s) Oral once  dextrose 5%. 1000 milliLiter(s) (50 mL/Hr) IV Continuous <Continuous>  dextrose 5%. 1000 milliLiter(s) (100 mL/Hr) IV Continuous <Continuous>  dextrose 50% Injectable 25 Gram(s) IV Push once  dextrose 50% Injectable 12.5 Gram(s) IV Push once  dextrose 50% Injectable 25 Gram(s) IV Push once  glucagon  Injectable 1 milliGRAM(s) IntraMuscular once  insulin lispro (ADMELOG) corrective regimen sliding scale   SubCutaneous every 6 hours  lactobacillus acidophilus 1 Tablet(s) Oral two times a day  metoprolol tartrate 50 milliGRAM(s) Oral two times a day  multivitamin 1 Tablet(s) Oral daily  nystatin Powder 1 Application(s) Topical every 8 hours  pantoprazole  Injectable 40 milliGRAM(s) IV Push daily  rivaroxaban 20 milliGRAM(s) Oral with dinner  vancomycin    Solution 250 milliGRAM(s) Oral every 6 hours  zinc sulfate 220 milliGRAM(s) Oral daily    MEDICATIONS  (PRN):  acetaminophen    Suspension .. 650 milliGRAM(s) Oral every 6 hours PRN Temp greater or equal to 38C (100.4F), Mild Pain (1 - 3), Moderate Pain (4 - 6)  artificial tears (preservative free) Ophthalmic Solution 1 Drop(s) Both EYES every 8 hours PRN Dry Eyes  guaiFENesin   Syrup  (Sugar-Free) 100 milliGRAM(s) Oral every 6 hours PRN Cough  oxyCODONE    Solution 5 milliGRAM(s) Oral every 4 hours PRN Severe Pain (7 - 10)      LABS:                       MICROBIOLOGY:     RADIOLOGY:  [ ] Reviewed and interpreted by me    PULMONARY FUNCTION TESTS:    EKG:

## 2021-02-25 DIAGNOSIS — J96.90 RESPIRATORY FAILURE, UNSPECIFIED, UNSPECIFIED WHETHER WITH HYPOXIA OR HYPERCAPNIA: ICD-10-CM

## 2021-02-25 DIAGNOSIS — U07.1 COVID-19: ICD-10-CM

## 2021-02-25 DIAGNOSIS — R19.7 DIARRHEA, UNSPECIFIED: ICD-10-CM

## 2021-02-25 DIAGNOSIS — Z93.1 GASTROSTOMY STATUS: ICD-10-CM

## 2021-02-25 DIAGNOSIS — K43.2 INCISIONAL HERNIA WITHOUT OBSTRUCTION OR GANGRENE: ICD-10-CM

## 2021-02-25 DIAGNOSIS — A49.8 OTHER BACTERIAL INFECTIONS OF UNSPECIFIED SITE: ICD-10-CM

## 2021-02-25 DIAGNOSIS — Z87.2 PERSONAL HISTORY OF DISEASES OF THE SKIN AND SUBCUTANEOUS TISSUE: ICD-10-CM

## 2021-02-25 LAB
24R-OH-CALCIDIOL SERPL-MCNC: 21.1 NG/ML — LOW (ref 30–80)
ANION GAP SERPL CALC-SCNC: 13 MMOL/L — SIGNIFICANT CHANGE UP (ref 7–14)
BUN SERPL-MCNC: 9 MG/DL — SIGNIFICANT CHANGE UP (ref 7–23)
CALCIUM SERPL-MCNC: 9.9 MG/DL — SIGNIFICANT CHANGE UP (ref 8.4–10.5)
CHLORIDE SERPL-SCNC: 100 MMOL/L — SIGNIFICANT CHANGE UP (ref 98–107)
CO2 SERPL-SCNC: 23 MMOL/L — SIGNIFICANT CHANGE UP (ref 22–31)
CREAT SERPL-MCNC: 0.28 MG/DL — LOW (ref 0.5–1.3)
CULTURE RESULTS: SIGNIFICANT CHANGE UP
GLUCOSE BLDC GLUCOMTR-MCNC: 142 MG/DL — HIGH (ref 70–99)
GLUCOSE BLDC GLUCOMTR-MCNC: 148 MG/DL — HIGH (ref 70–99)
GLUCOSE BLDC GLUCOMTR-MCNC: 154 MG/DL — HIGH (ref 70–99)
GLUCOSE BLDC GLUCOMTR-MCNC: 156 MG/DL — HIGH (ref 70–99)
GLUCOSE SERPL-MCNC: 142 MG/DL — HIGH (ref 70–99)
HCT VFR BLD CALC: 37.2 % — SIGNIFICANT CHANGE UP (ref 34.5–45)
HGB BLD-MCNC: 10.8 G/DL — LOW (ref 11.5–15.5)
MAGNESIUM SERPL-MCNC: 1.7 MG/DL — SIGNIFICANT CHANGE UP (ref 1.6–2.6)
MCHC RBC-ENTMCNC: 27.1 PG — SIGNIFICANT CHANGE UP (ref 27–34)
MCHC RBC-ENTMCNC: 29 GM/DL — LOW (ref 32–36)
MCV RBC AUTO: 93.2 FL — SIGNIFICANT CHANGE UP (ref 80–100)
NRBC # BLD: 0 /100 WBCS — SIGNIFICANT CHANGE UP
NRBC # FLD: 0 K/UL — SIGNIFICANT CHANGE UP
PLATELET # BLD AUTO: 373 K/UL — SIGNIFICANT CHANGE UP (ref 150–400)
POTASSIUM SERPL-MCNC: 3.8 MMOL/L — SIGNIFICANT CHANGE UP (ref 3.5–5.3)
POTASSIUM SERPL-SCNC: 3.8 MMOL/L — SIGNIFICANT CHANGE UP (ref 3.5–5.3)
RBC # BLD: 3.99 M/UL — SIGNIFICANT CHANGE UP (ref 3.8–5.2)
RBC # FLD: 16.3 % — HIGH (ref 10.3–14.5)
SODIUM SERPL-SCNC: 136 MMOL/L — SIGNIFICANT CHANGE UP (ref 135–145)
SPECIMEN SOURCE: SIGNIFICANT CHANGE UP
WBC # BLD: 9.45 K/UL — SIGNIFICANT CHANGE UP (ref 3.8–10.5)
WBC # FLD AUTO: 9.45 K/UL — SIGNIFICANT CHANGE UP (ref 3.8–10.5)

## 2021-02-25 PROCEDURE — 99233 SBSQ HOSP IP/OBS HIGH 50: CPT | Mod: 25

## 2021-02-25 PROCEDURE — 31502 CHANGE OF WINDPIPE AIRWAY: CPT

## 2021-02-25 RX ORDER — VANCOMYCIN HCL 1 G
250 VIAL (EA) INTRAVENOUS EVERY 6 HOURS
Refills: 0 | Status: DISCONTINUED | OUTPATIENT
Start: 2021-02-25 | End: 2021-02-25

## 2021-02-25 RX ORDER — CLONAZEPAM 1 MG
0.5 TABLET ORAL DAILY
Refills: 0 | Status: DISCONTINUED | OUTPATIENT
Start: 2021-02-25 | End: 2021-03-04

## 2021-02-25 RX ORDER — CHOLESTYRAMINE 4 G/9G
4 POWDER, FOR SUSPENSION ORAL DAILY
Refills: 0 | Status: DISCONTINUED | OUTPATIENT
Start: 2021-02-25 | End: 2021-02-26

## 2021-02-25 RX ORDER — DIPHENOXYLATE HCL/ATROPINE 2.5-.025MG
1 TABLET ORAL
Refills: 0 | Status: DISCONTINUED | OUTPATIENT
Start: 2021-02-25 | End: 2021-03-04

## 2021-02-25 RX ORDER — OXYCODONE HYDROCHLORIDE 5 MG/1
5 TABLET ORAL EVERY 4 HOURS
Refills: 0 | Status: DISCONTINUED | OUTPATIENT
Start: 2021-02-25 | End: 2021-02-25

## 2021-02-25 RX ADMIN — OXYCODONE HYDROCHLORIDE 5 MILLIGRAM(S): 5 TABLET ORAL at 06:10

## 2021-02-25 RX ADMIN — CHLORHEXIDINE GLUCONATE 1 APPLICATION(S): 213 SOLUTION TOPICAL at 12:13

## 2021-02-25 RX ADMIN — Medication 1 APPLICATION(S): at 18:00

## 2021-02-25 RX ADMIN — Medication 100 MILLIGRAM(S): at 08:42

## 2021-02-25 RX ADMIN — RIVAROXABAN 20 MILLIGRAM(S): KIT at 18:01

## 2021-02-25 RX ADMIN — ALBUTEROL 2 PUFF(S): 90 AEROSOL, METERED ORAL at 03:17

## 2021-02-25 RX ADMIN — Medication 100 MILLIGRAM(S): at 21:52

## 2021-02-25 RX ADMIN — Medication 100 MILLIGRAM(S): at 02:20

## 2021-02-25 RX ADMIN — NYSTATIN CREAM 1 APPLICATION(S): 100000 CREAM TOPICAL at 05:34

## 2021-02-25 RX ADMIN — Medication 50 MILLIGRAM(S): at 18:01

## 2021-02-25 RX ADMIN — Medication 1 TABLET(S): at 05:37

## 2021-02-25 RX ADMIN — Medication 50 MILLIGRAM(S): at 05:37

## 2021-02-25 RX ADMIN — PANTOPRAZOLE SODIUM 40 MILLIGRAM(S): 20 TABLET, DELAYED RELEASE ORAL at 12:10

## 2021-02-25 RX ADMIN — Medication 125 MILLIGRAM(S): at 05:38

## 2021-02-25 RX ADMIN — Medication 1 TABLET(S): at 18:01

## 2021-02-25 RX ADMIN — NYSTATIN CREAM 1 APPLICATION(S): 100000 CREAM TOPICAL at 13:41

## 2021-02-25 RX ADMIN — Medication 250 MILLIGRAM(S): at 12:12

## 2021-02-25 RX ADMIN — ZINC SULFATE TAB 220 MG (50 MG ZINC EQUIVALENT) 220 MILLIGRAM(S): 220 (50 ZN) TAB at 12:11

## 2021-02-25 RX ADMIN — Medication 0.5 MILLIGRAM(S): at 12:11

## 2021-02-25 RX ADMIN — ALBUTEROL 2 PUFF(S): 90 AEROSOL, METERED ORAL at 23:55

## 2021-02-25 RX ADMIN — Medication 1 TABLET(S): at 13:40

## 2021-02-25 RX ADMIN — NYSTATIN CREAM 1 APPLICATION(S): 100000 CREAM TOPICAL at 21:29

## 2021-02-25 RX ADMIN — ALBUTEROL 2 PUFF(S): 90 AEROSOL, METERED ORAL at 15:38

## 2021-02-25 RX ADMIN — OXYCODONE HYDROCHLORIDE 5 MILLIGRAM(S): 5 TABLET ORAL at 21:52

## 2021-02-25 RX ADMIN — Medication 1 APPLICATION(S): at 05:34

## 2021-02-25 RX ADMIN — Medication 650 MILLIGRAM(S): at 12:10

## 2021-02-25 RX ADMIN — AMLODIPINE BESYLATE 5 MILLIGRAM(S): 2.5 TABLET ORAL at 05:33

## 2021-02-25 RX ADMIN — Medication 2: at 12:17

## 2021-02-25 RX ADMIN — Medication 500 MILLIGRAM(S): at 12:13

## 2021-02-25 RX ADMIN — Medication 1 TABLET(S): at 12:11

## 2021-02-25 RX ADMIN — ALBUTEROL 2 PUFF(S): 90 AEROSOL, METERED ORAL at 09:37

## 2021-02-25 NOTE — PROGRESS NOTE ADULT - ASSESSMENT
61 YO F with PMHx of HTN, DVT on Xarelto, Peritonitis s/p Oral's Procedure and Ileostomy (reversed in 2011) and AARON who presented to [facility] on 11/18 for AHRF second to COVID 19, intubated on 11/23 complicated by R lung abscesses on CT CHEST 12/5, multibacterial PNA and bacteremia, ARTEMIO s/p CRRT and HD, and s/p Tracheostomy 12/18 c/b track leak and s/p OR 8Bivona. Transferred to RCU for furhter management.     # AMS/ Anxiety   - AOX3 at baseline.   - CTH and EEG WNL   - Anxiety noted and Klonopin started.   - Supportive care.     # ARDS second to COVID vs Multi-bacterial PNA/ R Lung Abscess  - Intubated 11/23 and c/b multi-bacterial PNA and lung abscesses.   - s/p Tracheostomy on 12/18 by CTSx. Sutures out 1/1  - Air leak noted and s/p Trach Exchange for Bivona with CTSx 12/31  - Tolerating TC with PMV QD and Vent QHS .   - Proventil, IPV and Chest PT Q6H and Suction PRN. Trach Care QD.     # Vasoplegic vs Septic Shock   - Continue on Norvasc and Metoprolol.   - On full AC for Hx of DVT. CTA CHEST with no PEs.     # ARTEMIO s/p CRRT and HD   - ARTEMIO now resolved and no longer required HD.   - Hypokalemia in setting of diarrhea/ CDIFF. Monitor electrolytes.   - Monitor renal function and avoid nephrotoxins.     # GI   - s/p PEG placement with IR 1/11   - Continue on TF and monitor tolerance.     # Sepsis second to COVID vs Multi-bacterial PNA/ Bacteremia and R Lung Abscess vs CDIFF (+)   - COVID with superimposed multi-bacterial VAP vs aspiration PNA vs cavitary PNA.   - SCx (11/24) MSSA and BCx (11/27) with MSSA and Proteus Bacteremia  - SCx (12/1, 12/12 and 12/27) with Stenotrophomonas.   - SCx (1/9) with  Pseudomonas and Stenotrophomonas   - BCX has been persistently negative from 12/1, and most recently 1/12   - CT CHEST 12/4 with right lung cavitation.   - s/p multiple antibiotics, but now completed Fortaz and Flagyl (1/12-1/16)  - RPT CT CHEST 1/28 with improvement of right lung abscess   - SCx with  Pseudomonas and Stenotrophomonas and s/p TED/ Fortaz (1/28-2/6)  - ID reconsulted and recommending GI eval for other cause of diarrhea. GI (Branden Ponce) consulted.   - ID recommending to continue Vanc for now but at lower dose.    # CDIFF (+)   - Started on PO Vancomycin 2/1, then increased to high dose 250mg 2/12-  - Still with loose stool in flexi 2/24  - ID reconsulted-f/u recs    # DM2   - Continue on ISS and NPH and monitor FS. Adjust insulin as needed.     # Hx of DVT   - Continue on Xarelto.     # Skin/ Facial Wounds   - WOC recommendations appreciated   - Plastics evaluation appreciated    # Contracted Wrists   - PT/OT working on strength.     # DVT/ GI PPX with FULL AC/ PPI   # CODE STATUS - FULL CODE   # DISPO - PT recc Rehab/ Vent Facility  61 YO F with PMHx of HTN, DVT on Xarelto, Peritonitis s/p Oral's Procedure and Ileostomy (reversed in 2011) and AARON who presented to [facility] on 11/18 for AHRF second to COVID 19, intubated on 11/23 complicated by R lung abscesses on CT CHEST 12/5, multibacterial PNA and bacteremia, ARTEMIO s/p CRRT and HD, and s/p Tracheostomy 12/18 c/b track leak and s/p OR 8Bivona. Transferred to RCU for furhter management.     # AMS/ Anxiety   - AOX3 at baseline.   - CTH and EEG WNL   - Anxiety noted and Klonopin started.   - Supportive care.     # ARDS second to COVID vs Multi-bacterial PNA/ R Lung Abscess  - Intubated 11/23 and c/b multi-bacterial PNA and lung abscesses.   - s/p Tracheostomy on 12/18 by CTSx. Sutures out 1/1  - Air leak noted and s/p Bivona with CTSx 12/31. Now downsized to 6DCT 2/25  - Tolerating TC with PMV QD and Vent QHS .   - Proventil, IPV and Chest PT Q6H and Suction PRN. Trach Care QD.     # Vasoplegic vs Septic Shock   - Continue on Norvasc and Metoprolol.   - On full AC for Hx of DVT. CTA CHEST with no PEs.     # ARTEMIO s/p CRRT and HD   - ARTEMIO now resolved and no longer required HD.   - Hypokalemia in setting of diarrhea/ CDIFF. Monitor electrolytes.   - Monitor renal function and avoid nephrotoxins.     # GI   - s/p PEG placement with IR 1/11   - Continue on TF and monitor tolerance.     # Sepsis second to COVID vs Multi-bacterial PNA/ Bacteremia and R Lung Abscess vs CDIFF (+)   - COVID with superimposed multi-bacterial VAP vs aspiration PNA vs cavitary PNA.   - SCx (11/24) MSSA and BCx (11/27) with MSSA and Proteus Bacteremia  - SCx (12/1, 12/12 and 12/27) with Stenotrophomonas.   - SCx (1/9) with  Pseudomonas and Stenotrophomonas   - BCX has been persistently negative from 12/1, and most recently 1/12   - CT CHEST 12/4 with right lung cavitation.   - s/p multiple antibiotics, but now completed Fortaz and Flagyl (1/12-1/16)  - RPT CT CHEST 1/28 with improvement of right lung abscess   - SCx with  Pseudomonas and Stenotrophomonas and s/p TED/ Fortaz (1/28-2/6)    # CDIFF (+)   - Started on PO Vancomycin 2/1-2/25 with no improvement in diarrhea.   - Cholestyramine attempted, but failed.   - No further Vancomycin to be given. Lomotil QID trial.   - ID following     # DM2   - Continue on ISS and monitor FS. Adjust insulin as needed.     # Hx of DVT   - Continue on Xarelto.     # Skin/ Facial Wounds   - WOC recommendations appreciated   - Plastics evaluation appreciated    # Contracted Wrists   - PT/OT working on strength.     # DVT/ GI PPX with FULL AC/ PPI   # CODE STATUS - FULL CODE   # DISPO - PT recc Rehab/ Vent Facility

## 2021-02-25 NOTE — PROGRESS NOTE ADULT - ATTENDING COMMENTS
Agree with plan as outlined above. Patient seen and examined at bedside. Patient history, laboratory data, and imaging personally reviewed.    Pt is a 60F with PMHx as above presenting to [facility] for hypoxemic respiratory failure with ARDS 2/2 COVID19 PNA further c/b superimposed MSSA cavitary PNA and ARF requiring HD. Pt with failure to wean from mechanical ventilation, s/p tracheostomy placement 12/18 and transfer to RCU 12/28.     Pt now with significantly improving encephalopathy, able to communicate effectively and answer questions/follow commands. Remains off all IV sedation since 12/24. CT Head (-) 12/12. EEG (-). No indication for further neurologic w/u at this time given clinical improvement. OOB to chair as tolerated. Moves all 4 extremities independently, but remains physically severely deconditioned and dependent of ADLs likely 2/2 critical illness polyneuropathy.     Pt with trach placement (CTSx, 12/18 Proximal 7XLT and then changed to 8Fr Bivona 12/31 for persistent air leak), now with resolution of air leak and significant improvement in ventilator synchrony. Daily SBTs as tolerated. Pt tolerating trach collar QD, PSV qhs. Pt able to tolerate PMV intermittently, but is not independent in use. Will c/w airway clearance therapy and trach care as per RCU team. Successfully changed to #6 cuffed Shiley by thoracic sx team this morning. Will continue to wean as tolerated.     Pt initially p/w acute hypoxemic respiratory failure 2/2 COVID19 PNA but with hospital course c/b MSSA bacteremia with cavitary PNA s/p completion of Abx with Primaxin on 12/28 followed by course of Ceftazidime through 1/5 for Stenotrophomonas PNA followed by VAP 2/2 Stenotrophomonas and Pseudomonas 1/12 s/p completion of Flagyl+Ceftazidime+Vancomycin with short-term clinical improvement followed by recurrent of VAP 2/2 CRE Pseudomonas/Stenotrophomonas, s/p eradication therapy with IV+Nebulized Abx 1/28-2/6. Repeat CT Chest performed 12/4 showed significantly improved cavitary lesions from prior MSSA PNA without area requiring further source control. Hospital course then c/b CDiff colitis 2/1, now on PO Vancomycin with improvement in diarrhea but persistent loose stools. GI and ID recs appreciated, persistent diarrhea unlikely 2/2 CDiff at this time. Stool cx PCR pending, will start loperamide and cholestyramine today.     Pt with oropharyngeal dysphagia, IR guided PEG successfully placed 1/11. Pt tolerating PEG feeds. Pt also initially with ARF on CRRT, following by intermittent HD (last session 12/17) with renal recovery. NPH+HISS for glucose control. Wound consult and plastic sx recs appreciated.    Dispo pending medical optimization, likely ROJELIO.

## 2021-02-25 NOTE — CONSULT NOTE ADULT - PROBLEM SELECTOR RECOMMENDATION 3
Doubt due to c-diff given ongoing diarrhea despite prolonged course of treatment. Likely multifactorial, due to tube feeding, bile salts, bacterial overgrowth. Rule out infection.  -f/u GI PCR  -would trial lomotil QID  -if persisting, can consider flex sig  -monitor stool volume Doubt due to c-diff given ongoing diarrhea despite prolonged course of treatment. Likely multifactorial, due to tube feeding, bile salts, bacterial overgrowth. Rule out infection.  -f/u GI PCR  -would trial lomotil QID  -if persisting, can consider flex sig  -monitor stool volume  -would hold vitamin C, can cause diarrhea  -add banatrol if this has not been done

## 2021-02-25 NOTE — CHART NOTE - NSCHARTNOTEFT_GEN_A_CORE
Trach changed to 6 cuffed shily by Dr Brock this morning  pt tolerated procedure well  placement confirmed with CO2 detector and she is oxygenating well on trach collar  care d/w [facility] team  please reconsult with concerns    Anna ACOSTA [id number] Trach changed to 6 cuffed shily by Dr Brock this morning  pt tolerated procedure well  placement confirmed with CO2 detector and she is oxygenating well on trach collar  care d/w RCU team  please reconsult thoracic surgery with concerns    Anna ACOSTA [id number]

## 2021-02-25 NOTE — CONSULT NOTE ADULT - PROBLEM SELECTOR RECOMMENDATION 4
reducible, nontender. No signs of incarceration. Patinet has hx of bowel resection, but she cannot provide the exact circumstances.  -serial abdominal exams

## 2021-02-25 NOTE — PROGRESS NOTE ADULT - SUBJECTIVE AND OBJECTIVE BOX
CHIEF COMPLAINT:    SUBJECTIVE:     ROS:     OBJECTIVE:  ICU Vital Signs Last 24 Hrs  T(C): 36.6 (25 Feb 2021 05:26), Max: 37.2 (24 Feb 2021 21:03)  T(F): 97.9 (25 Feb 2021 05:26), Max: 98.9 (24 Feb 2021 21:03)  HR: 92 (25 Feb 2021 06:46) (88 - 103)  BP: 139/63 (25 Feb 2021 05:26) (139/63 - 154/59)  BP(mean): 84 (25 Feb 2021 05:26) (84 - 96)  ABP: --  ABP(mean): --  RR: 24 (25 Feb 2021 05:26) (19 - 24)  SpO2: 98% (25 Feb 2021 06:46) (96% - 100%)    Mode: AC/ CMV (Assist Control/ Continuous Mandatory Ventilation), RR (machine): 12, TV (machine): 450, FiO2: 25, PEEP: 5, MAP: 8, PIP: 26    CAPILLARY BLOOD GLUCOSE  POCT Blood Glucose.: 142 mg/dL (25 Feb 2021 05:34)    PHYSICAL EXAM:  General:   HEENT:   Lymph Nodes:  Neck:   Respiratory:   Cardiovascular:   Abdomen:   Extremities:   Skin:   Neurological:  Psychiatry:    HOSPITAL MEDICATIONS:  MEDICATIONS  (STANDING):  ALBUTerol    90 MICROgram(s) HFA Inhaler 2 Puff(s) Inhalation every 6 hours  amLODIPine   Tablet 5 milliGRAM(s) Oral daily  ascorbic acid 500 milliGRAM(s) Oral daily  chlorhexidine 4% Liquid 1 Application(s) Topical daily  clonazePAM  Tablet 0.5 milliGRAM(s) Oral daily  collagenase Ointment 1 Application(s) Topical two times a day  dextrose 40% Gel 15 Gram(s) Oral once  dextrose 5%. 1000 milliLiter(s) (50 mL/Hr) IV Continuous <Continuous>  dextrose 5%. 1000 milliLiter(s) (100 mL/Hr) IV Continuous <Continuous>  dextrose 50% Injectable 25 Gram(s) IV Push once  dextrose 50% Injectable 12.5 Gram(s) IV Push once  dextrose 50% Injectable 25 Gram(s) IV Push once  glucagon  Injectable 1 milliGRAM(s) IntraMuscular once  insulin lispro (ADMELOG) corrective regimen sliding scale   SubCutaneous every 6 hours  lactobacillus acidophilus 1 Tablet(s) Oral two times a day  metoprolol tartrate 50 milliGRAM(s) Oral two times a day  multivitamin 1 Tablet(s) Oral daily  nystatin Powder 1 Application(s) Topical every 8 hours  pantoprazole  Injectable 40 milliGRAM(s) IV Push daily  rivaroxaban 20 milliGRAM(s) Oral with dinner  vancomycin    Solution 250 milliGRAM(s) Oral every 6 hours  zinc sulfate 220 milliGRAM(s) Oral daily    MEDICATIONS  (PRN):  acetaminophen    Suspension .. 650 milliGRAM(s) Oral every 6 hours PRN Temp greater or equal to 38C (100.4F), Mild Pain (1 - 3), Moderate Pain (4 - 6)  artificial tears (preservative free) Ophthalmic Solution 1 Drop(s) Both EYES every 8 hours PRN Dry Eyes  guaiFENesin   Syrup  (Sugar-Free) 100 milliGRAM(s) Oral every 6 hours PRN Cough  oxyCODONE    Solution 5 milliGRAM(s) Oral every 4 hours PRN Severe Pain (7 - 10)    LABS:                        10.8   9.45  )-----------( 373      ( 25 Feb 2021 06:34 )             37.2     02-25    136  |  100  |  9   ----------------------------<  142<H>  3.8   |  23  |  0.28<L>    Ca    9.9      25 Feb 2021 06:34  Mg     1.7     02-25   CHIEF COMPLAINT: Patient is a 60y old  Female who presents with a chief complaint of Transfer from [facility] (25 Feb 2021 09:10)    Interval Events: No interval events overnight, but diarrhea persists.     ROS: Seen by bedside during bedside and denies SOB or pain. Notes continued diarrhea.     OBJECTIVE:  ICU Vital Signs Last 24 Hrs  T(C): 36.6 (25 Feb 2021 05:26), Max: 37.2 (24 Feb 2021 21:03)  T(F): 97.9 (25 Feb 2021 05:26), Max: 98.9 (24 Feb 2021 21:03)  HR: 92 (25 Feb 2021 06:46) (88 - 103)  BP: 139/63 (25 Feb 2021 05:26) (139/63 - 154/59)  BP(mean): 84 (25 Feb 2021 05:26) (84 - 96)  ABP: --  ABP(mean): --  RR: 24 (25 Feb 2021 05:26) (19 - 24)  SpO2: 98% (25 Feb 2021 06:46) (96% - 100%)    Mode: AC/ CMV (Assist Control/ Continuous Mandatory Ventilation), RR (machine): 12, TV (machine): 450, FiO2: 25, PEEP: 5, MAP: 8, PIP: 26    CAPILLARY BLOOD GLUCOSE  POCT Blood Glucose.: 142 mg/dL (25 Feb 2021 05:34)    PHYSICAL EXAM:  General: NAD, well groomed. (+) Obese.   HEENT: NC/ AT, PERRLA, Tracheostomy clean, dry and intact.   Cardio: RRR, S1/S2, no murmurs or rubs.   Pulm: CTA with mild upper airway congestion, cleared on suctioning   GI: Soft, NDNT, BS (+). PEG (+) Rectal Tube with improving diarrhea (+)   MS: No pedal edema. AROMI, but limited second to weakness.   Neuro: Awake and alert. Follow commands. No focal neurological deficits noted.   Skin: Warm and dry. No jaundice or cyanosis    HOSPITAL MEDICATIONS:  MEDICATIONS  (STANDING):  ALBUTerol    90 MICROgram(s) HFA Inhaler 2 Puff(s) Inhalation every 6 hours  amLODIPine   Tablet 5 milliGRAM(s) Oral daily  ascorbic acid 500 milliGRAM(s) Oral daily  chlorhexidine 4% Liquid 1 Application(s) Topical daily  clonazePAM  Tablet 0.5 milliGRAM(s) Oral daily  collagenase Ointment 1 Application(s) Topical two times a day  dextrose 40% Gel 15 Gram(s) Oral once  dextrose 5%. 1000 milliLiter(s) (50 mL/Hr) IV Continuous <Continuous>  dextrose 5%. 1000 milliLiter(s) (100 mL/Hr) IV Continuous <Continuous>  dextrose 50% Injectable 25 Gram(s) IV Push once  dextrose 50% Injectable 12.5 Gram(s) IV Push once  dextrose 50% Injectable 25 Gram(s) IV Push once  glucagon  Injectable 1 milliGRAM(s) IntraMuscular once  insulin lispro (ADMELOG) corrective regimen sliding scale   SubCutaneous every 6 hours  lactobacillus acidophilus 1 Tablet(s) Oral two times a day  metoprolol tartrate 50 milliGRAM(s) Oral two times a day  multivitamin 1 Tablet(s) Oral daily  nystatin Powder 1 Application(s) Topical every 8 hours  pantoprazole  Injectable 40 milliGRAM(s) IV Push daily  rivaroxaban 20 milliGRAM(s) Oral with dinner  vancomycin    Solution 250 milliGRAM(s) Oral every 6 hours  zinc sulfate 220 milliGRAM(s) Oral daily    MEDICATIONS  (PRN):  acetaminophen    Suspension .. 650 milliGRAM(s) Oral every 6 hours PRN Temp greater or equal to 38C (100.4F), Mild Pain (1 - 3), Moderate Pain (4 - 6)  artificial tears (preservative free) Ophthalmic Solution 1 Drop(s) Both EYES every 8 hours PRN Dry Eyes  guaiFENesin   Syrup  (Sugar-Free) 100 milliGRAM(s) Oral every 6 hours PRN Cough  oxyCODONE    Solution 5 milliGRAM(s) Oral every 4 hours PRN Severe Pain (7 - 10)    LABS:                        10.8   9.45  )-----------( 373      ( 25 Feb 2021 06:34 )             37.2     02-25    136  |  100  |  9   ----------------------------<  142<H>  3.8   |  23  |  0.28<L>    Ca    9.9      25 Feb 2021 06:34  Mg     1.7     02-25

## 2021-02-25 NOTE — CONSULT NOTE ADULT - SUBJECTIVE AND OBJECTIVE BOX
Chief Complaint:  Patient is a 60y old  Female who presents with a chief complaint of Transfer from [facility] (2021 09:10)      HPI:    The patient is a 59 YO F with PMHx of HTN, DVT on Xarelto, Peritonitis s/p Oral's Procedure and Ileostomy (reversed in ) and AARON who presented to [facility] on  for respiratory failure second to COVID 19, intubated on  complicated by R lung abscesses on , multibacterial PNA and bacteremia, ARTEMIO which required CRRT and HD, and s/p Tracheostomy . She had a G tube placed by IR. The patient has been having profuse watery diarrhea. The patient denies abdominal pain, nausea or vomiting.    The patient denies dysphagia, nausea and vomiting, abdominal pain.    She does not recall if she had a colonoscopy previously.    She has received a prolonged course of vancomycin for cdiff.      Allergies:  Kiwi (Unknown)  latex (Anaphylaxis)  latex (Unknown)  penicillin (Other)  penicillin (Unknown)  perfume  hives (Other)  potassium acetate (Other)  soap additives hives (Other)      Home Medications:    Hospital Medications:  acetaminophen    Suspension .. 650 milliGRAM(s) Oral every 6 hours PRN  ALBUTerol    90 MICROgram(s) HFA Inhaler 2 Puff(s) Inhalation every 6 hours  amLODIPine   Tablet 5 milliGRAM(s) Oral daily  artificial tears (preservative free) Ophthalmic Solution 1 Drop(s) Both EYES every 8 hours PRN  ascorbic acid 500 milliGRAM(s) Oral daily  chlorhexidine 4% Liquid 1 Application(s) Topical daily  cholestyramine Powder (Sugar-Free) 4 Gram(s) Oral daily  clonazePAM  Tablet 0.5 milliGRAM(s) Oral daily  collagenase Ointment 1 Application(s) Topical two times a day  dextrose 40% Gel 15 Gram(s) Oral once  dextrose 5%. 1000 milliLiter(s) IV Continuous <Continuous>  dextrose 5%. 1000 milliLiter(s) IV Continuous <Continuous>  dextrose 50% Injectable 25 Gram(s) IV Push once  dextrose 50% Injectable 12.5 Gram(s) IV Push once  dextrose 50% Injectable 25 Gram(s) IV Push once  diphenoxylate/atropine 1 Tablet(s) Oral four times a day  glucagon  Injectable 1 milliGRAM(s) IntraMuscular once  guaiFENesin   Syrup  (Sugar-Free) 100 milliGRAM(s) Oral every 6 hours PRN  insulin lispro (ADMELOG) corrective regimen sliding scale   SubCutaneous every 6 hours  lactobacillus acidophilus 1 Tablet(s) Oral two times a day  metoprolol tartrate 50 milliGRAM(s) Oral two times a day  multivitamin 1 Tablet(s) Oral daily  nystatin Powder 1 Application(s) Topical every 8 hours  oxyCODONE    Solution 5 milliGRAM(s) Oral every 4 hours PRN  pantoprazole  Injectable 40 milliGRAM(s) IV Push daily  rivaroxaban 20 milliGRAM(s) Oral with dinner  zinc sulfate 220 milliGRAM(s) Oral daily      PMHX/PSHX:  Pneumomediastinum    Umbilical hernia    Osteoarthritis of both knees, unspecified osteoarthritis type    AARON (Obstructive Sleep Apnea)    Pneumonia    S/P Colon Resection    pt with allergies to perfumes and addidtive to soaps, requires hypoallergenic sheets and gowns, line    Cervical Dysplasia    Latex Sensitivity    GERD (Gastroesophageal Reflux Disease)    Asthma    Anemia    S/P Joint Replacement    DVT (Deep Venous Thrombosis)    HTN (Hypertension)    H/O ileostomy    S/P LEEP    S/P Exploratory Laparotomy    S/P Colonoscopy    S/P Endoscopy    S/P  Section    History of Tubal Ligation    S/P Knee Surgery    Umbilical Hernia    Osteoarthritis of Knee    HTN (Hypertension)        Family history:  Family history of gastric cancer    Family history of breast cancer (Grandparent)        Social History:   Denies ethanol use.  Denies illicit drug use.    ROS:     General:  No wt loss, fevers, chills, night sweats, fatigue,   Eyes:  Good vision, no reported pain  ENT:  No sore throat, pain, runny nose, dysphagia  CV:  No pain, palpitations, hypo/hypertension  Resp:  No dyspnea, cough, tachypnea, wheezing  GI:  See HPI  :  No pain, bleeding, incontinence, nocturia  Muscle:  + weakness, no cramp  Neuro:  + weakness, no tingling, memory problems  Psych:  No fatigue, insomnia, mood problems, depression  Endocrine:  No polyuria, polydipsia, cold/heat intolerance  Heme:  No petechiae, ecchymosis, easy bruisability  Integumentary:  No rash, edema      PHYSICAL EXAM:     GENERAL:  Appears stated age, well-groomed, well-nourished, no distress  HEENT:  NC/AT,  conjunctivae anicteric, clear and pink,   NECK: supple, trachea midline  CHEST:  Full & symmetric excursion, no increased effort, breath sounds clear  HEART:  Regular rhythm, no JVD  ABDOMEN:  Soft, non-tender, non-distended, normoactive bowel sounds,  no masses , no hepatosplenomegaly, g tube tender, reducible and soft incisional hernia  EXTREMITIES:  no cyanosis,clubbing or edema  SKIN:  No rash, erythema, or, ecchymoses, no jaundice, +pressure ulcer  NEURO:  Alert, non-focal, no asterixis  PSYCH: Appropriate affect, oriented to place and time  RECTAL: Deferred      Vital Signs:  Vital Signs Last 24 Hrs  T(C): 37 (2021 20:00), Max: 37 (2021 20:00)  T(F): 98.6 (2021 20:00), Max: 98.6 (2021 20:00)  HR: 94 (2021 20:00) (77 - 102)  BP: 140/58 (2021 20:00) (129/69 - 140/61)  BP(mean): 83 (2021 20:00) (83 - 84)  RR: 14 (2021 20:00) (12 - 24)  SpO2: 99% (2021 20:00) (95% - 100%)  Daily     Daily     LABS:                        10.8   9.45  )-----------( 373      ( 2021 06:34 )             37.2     02-    136  |  100  |  9   ----------------------------<  142<H>  3.8   |  23  |  0.28<L>    Ca    9.9      2021 06:34  Mg     1.7

## 2021-02-26 LAB
CULTURE RESULTS: SIGNIFICANT CHANGE UP
CULTURE RESULTS: SIGNIFICANT CHANGE UP
GLUCOSE BLDC GLUCOMTR-MCNC: 130 MG/DL — HIGH (ref 70–99)
GLUCOSE BLDC GLUCOMTR-MCNC: 131 MG/DL — HIGH (ref 70–99)
GLUCOSE BLDC GLUCOMTR-MCNC: 192 MG/DL — HIGH (ref 70–99)
SPECIMEN SOURCE: SIGNIFICANT CHANGE UP
SPECIMEN SOURCE: SIGNIFICANT CHANGE UP

## 2021-02-26 PROCEDURE — 99233 SBSQ HOSP IP/OBS HIGH 50: CPT | Mod: 25

## 2021-02-26 RX ORDER — CHLORHEXIDINE GLUCONATE 213 G/1000ML
15 SOLUTION TOPICAL EVERY 12 HOURS
Refills: 0 | Status: DISCONTINUED | OUTPATIENT
Start: 2021-02-26 | End: 2021-02-27

## 2021-02-26 RX ORDER — CHOLESTYRAMINE 4 G/9G
4 POWDER, FOR SUSPENSION ORAL
Refills: 0 | Status: DISCONTINUED | OUTPATIENT
Start: 2021-02-26 | End: 2021-03-19

## 2021-02-26 RX ADMIN — Medication 1 TABLET(S): at 11:42

## 2021-02-26 RX ADMIN — Medication 500 MILLIGRAM(S): at 11:43

## 2021-02-26 RX ADMIN — Medication 1 TABLET(S): at 17:56

## 2021-02-26 RX ADMIN — CHOLESTYRAMINE 4 GRAM(S): 4 POWDER, FOR SUSPENSION ORAL at 23:08

## 2021-02-26 RX ADMIN — ALBUTEROL 2 PUFF(S): 90 AEROSOL, METERED ORAL at 16:38

## 2021-02-26 RX ADMIN — NYSTATIN CREAM 1 APPLICATION(S): 100000 CREAM TOPICAL at 06:00

## 2021-02-26 RX ADMIN — ALBUTEROL 2 PUFF(S): 90 AEROSOL, METERED ORAL at 08:41

## 2021-02-26 RX ADMIN — AMLODIPINE BESYLATE 5 MILLIGRAM(S): 2.5 TABLET ORAL at 07:07

## 2021-02-26 RX ADMIN — Medication 50 MILLIGRAM(S): at 07:08

## 2021-02-26 RX ADMIN — Medication 1 APPLICATION(S): at 17:56

## 2021-02-26 RX ADMIN — Medication 2: at 00:58

## 2021-02-26 RX ADMIN — ALBUTEROL 2 PUFF(S): 90 AEROSOL, METERED ORAL at 04:42

## 2021-02-26 RX ADMIN — CHLORHEXIDINE GLUCONATE 1 APPLICATION(S): 213 SOLUTION TOPICAL at 11:43

## 2021-02-26 RX ADMIN — RIVAROXABAN 20 MILLIGRAM(S): KIT at 17:57

## 2021-02-26 RX ADMIN — Medication 1 TABLET(S): at 11:43

## 2021-02-26 RX ADMIN — Medication 1 TABLET(S): at 07:08

## 2021-02-26 RX ADMIN — Medication 50 MILLIGRAM(S): at 17:56

## 2021-02-26 RX ADMIN — CHLORHEXIDINE GLUCONATE 15 MILLILITER(S): 213 SOLUTION TOPICAL at 17:57

## 2021-02-26 RX ADMIN — ZINC SULFATE TAB 220 MG (50 MG ZINC EQUIVALENT) 220 MILLIGRAM(S): 220 (50 ZN) TAB at 11:42

## 2021-02-26 RX ADMIN — CHOLESTYRAMINE 4 GRAM(S): 4 POWDER, FOR SUSPENSION ORAL at 11:43

## 2021-02-26 RX ADMIN — Medication 1 TABLET(S): at 00:57

## 2021-02-26 RX ADMIN — NYSTATIN CREAM 1 APPLICATION(S): 100000 CREAM TOPICAL at 23:08

## 2021-02-26 RX ADMIN — Medication 2: at 11:48

## 2021-02-26 RX ADMIN — Medication 1 APPLICATION(S): at 06:55

## 2021-02-26 RX ADMIN — Medication 0.5 MILLIGRAM(S): at 11:43

## 2021-02-26 RX ADMIN — PANTOPRAZOLE SODIUM 40 MILLIGRAM(S): 20 TABLET, DELAYED RELEASE ORAL at 11:42

## 2021-02-26 RX ADMIN — ALBUTEROL 2 PUFF(S): 90 AEROSOL, METERED ORAL at 22:15

## 2021-02-26 RX ADMIN — NYSTATIN CREAM 1 APPLICATION(S): 100000 CREAM TOPICAL at 14:29

## 2021-02-26 NOTE — PROGRESS NOTE ADULT - NSHPATTENDINGPLANDISCUSS_GEN_ALL_CORE
COVID-ICU team
NP/fellow/nurse
Plan discussed with resident/fellow/nurse.
rcu
Plan discussed with resident/fellow/nurse.
RCU Team
resident/fellow/nurse
Plan discussed with resident/fellow/nurse.
RCU Team
COVID-ICU team
Plan discussed with resident/fellow/nurse.
RCU Team
rcu
team
RCU Team
RCU Team
team
team
ICU team
rcu
ICU team
ICU team at bedside.
Team
RCU Team
team
RCU Team
team

## 2021-02-26 NOTE — PROGRESS NOTE ADULT - ASSESSMENT
60 year old female with prolonged hospital course secondary to COVID 19, GI consulted for persistent diarrhea     Problem/Recommendation - 1:  Problem: COVID-19. Recommendation: status post treatment by infectious disease  now off of isolation.     Problem/Recommendation - 2:  ·  Problem: Respiratory failure.  Recommendation: status post tracheostomy, now downsized, on trach collar.      Problem/Recommendation - 3:  ·  Problem: Diarrhea.  Recommendation: Doubt due to c-diff given ongoing diarrhea despite prolonged course of treatment. Likely multifactorial, due to tube feeding, bile salts, bacterial overgrowth. Rule out infection. GI PCR negative. Ouptut today from rectal tube 400.  -continue lomotil QID, increased cholestyramine to BID  -monitor stool volume  -would hold vitamin C, can cause diarrhea  -add banatrol if this has not been done.     Problem/Recommendation - 4:  ·  Problem: Hernia, incisional.  Recommendation: reducible, nontender. No signs of incarceration. Randi has hx of bowel resection, but she cannot provide the exact circumstances.  -serial abdominal exams.      Problem/Recommendation - 5:  ·  Problem: Gastrostomy present.  Recommendation: functioning well.      Problem/Recommendation - 6:  Problem: ARTEMIO (acute kidney injury). Recommendation: s/p CVVHD, now improved.     Problem/Recommendation - 7:  Problem: History of pressure ulcer. Recommendation: per RCU team.     Problem/Recommendation - 8:  Problem: Clostridium difficile infection. Recommendation: status post 2 weeks of PO vanco  -vanco per ID on hold.    Attending Attestation:   Differential diagnosis and plan of care discussed with patient after the evaluation  75 Minutes spent on total encounter of which more than fifty percent of the encounter was spent counseling and/or coordinating care by the attending physician.      Ranjeet Ponce M.D.   Gastroenterology and Hepatology  Cell: [phone].

## 2021-02-26 NOTE — PROGRESS NOTE ADULT - SUBJECTIVE AND OBJECTIVE BOX
Chief Complaint:  Patient is a 60y old  Female who presents with a chief complaint of Transfer from [facility] (2021 08:15)      Interval Events:   no acute events  400 cc rectal tube output recorded    Allergies:  Kiwi (Unknown)  latex (Anaphylaxis)  latex (Unknown)  penicillin (Other)  penicillin (Unknown)  perfume  hives (Other)  potassium acetate (Other)  soap additives hives (Other)      Hospital Medications:  acetaminophen    Suspension .. 650 milliGRAM(s) Oral every 6 hours PRN  ALBUTerol    90 MICROgram(s) HFA Inhaler 2 Puff(s) Inhalation every 6 hours  amLODIPine   Tablet 5 milliGRAM(s) Oral daily  artificial tears (preservative free) Ophthalmic Solution 1 Drop(s) Both EYES every 8 hours PRN  chlorhexidine 0.12% Liquid 15 milliLiter(s) Oral Mucosa every 12 hours  chlorhexidine 4% Liquid 1 Application(s) Topical daily  cholestyramine Powder (Sugar-Free) 4 Gram(s) Oral two times a day  clonazePAM  Tablet 0.5 milliGRAM(s) Oral daily  collagenase Ointment 1 Application(s) Topical two times a day  dextrose 40% Gel 15 Gram(s) Oral once  dextrose 5%. 1000 milliLiter(s) IV Continuous <Continuous>  dextrose 5%. 1000 milliLiter(s) IV Continuous <Continuous>  dextrose 50% Injectable 25 Gram(s) IV Push once  dextrose 50% Injectable 12.5 Gram(s) IV Push once  dextrose 50% Injectable 25 Gram(s) IV Push once  diphenoxylate/atropine 1 Tablet(s) Oral four times a day  glucagon  Injectable 1 milliGRAM(s) IntraMuscular once  guaiFENesin   Syrup  (Sugar-Free) 100 milliGRAM(s) Oral every 6 hours PRN  insulin lispro (ADMELOG) corrective regimen sliding scale   SubCutaneous every 6 hours  lactobacillus acidophilus 1 Tablet(s) Oral two times a day  metoprolol tartrate 50 milliGRAM(s) Oral two times a day  multivitamin 1 Tablet(s) Oral daily  nystatin Powder 1 Application(s) Topical every 8 hours  oxyCODONE    Solution 5 milliGRAM(s) Oral every 4 hours PRN  pantoprazole  Injectable 40 milliGRAM(s) IV Push daily  rivaroxaban 20 milliGRAM(s) Oral with dinner  zinc sulfate 220 milliGRAM(s) Oral daily      PMHX/PSHX:  Pneumomediastinum    Umbilical hernia    Osteoarthritis of both knees, unspecified osteoarthritis type    AARON (Obstructive Sleep Apnea)    Pneumonia    S/P Colon Resection    pt with allergies to perfumes and addidtive to soaps, requires hypoallergenic sheets and gowns, line    Cervical Dysplasia    Latex Sensitivity    GERD (Gastroesophageal Reflux Disease)    Asthma    Anemia    S/P Joint Replacement    DVT (Deep Venous Thrombosis)    HTN (Hypertension)    H/O ileostomy    S/P LEEP    S/P Exploratory Laparotomy    S/P Colonoscopy    S/P Endoscopy    S/P  Section    History of Tubal Ligation    S/P Knee Surgery    Umbilical Hernia    Osteoarthritis of Knee    HTN (Hypertension)        Family history:  Family history of gastric cancer    Family history of breast cancer (Grandparent)        ROS:     General:  No wt loss, fevers, chills, night sweats, fatigue,   Eyes:  Good vision, no reported pain  ENT:  No sore throat, pain, runny nose, dysphagia  CV:  No pain, palpitations, hypo/hypertension  Resp:  No dyspnea, cough, tachypnea, wheezing  GI:  See HPI  :  No pain, bleeding, incontinence, nocturia  Muscle:  No pain, weakness  Neuro:  No weakness, tingling, memory problems  Psych:  No fatigue, insomnia, mood problems, depression  Endocrine:  No polyuria, polydipsia, cold/heat intolerance  Heme:  No petechiae, ecchymosis, easy bruisability  Skin:  No rash, edema      PHYSICAL EXAM:     GENERAL:  Appears stated age, well-groomed, well-nourished, no distress  HEENT:  NC/AT,  conjunctivae clear, sclera-anicteric  NECK: Trachea midline, supple  CHEST:  Full & symmetric excursion, no increased effort, breath sounds clear  HEART:  Regular rhythm, no lola/heave  ABDOMEN:  Soft, non-tender, non-distended, normoactive bowel sounds,  no masses ,no hepato-splenomegaly, incisional hernia, g tube tender  EXTREMITIES:  no cyanosis,clubbing or edema  SKIN:  No rash/erythema/petechiae, no jaundice  NEURO:  Alert, oriented, no asterixis  RECTAL: Deferred    Vital Signs:  Vital Signs Last 24 Hrs  T(C): 36.7 (2021 11:36), Max: 37 (2021 20:00)  T(F): 98 (2021 11:36), Max: 98.6 (2021 20:00)  HR: 84 (2021 19:38) (84 - 105)  BP: 139/62 (2021 17:55) (122/60 - 147/62)  BP(mean): 84 (2021 04:00) (83 - 86)  RR: 19 (2021 11:36) (14 - 20)  SpO2: 99% (2021 19:38) (96% - 100%)  Daily     Daily     LABS:                        10.8   9.45  )-----------( 373      ( 2021 06:34 )             37.2     02    136  |  100  |  9   ----------------------------<  142<H>  3.8   |  23  |  0.28<L>    Ca    9.9      2021 06:34  Mg     1.7                     Imaging:

## 2021-02-26 NOTE — PROGRESS NOTE ADULT - SUBJECTIVE AND OBJECTIVE BOX
CHIEF COMPLAINT: Patient is a 60y old  Female who presents with a chief complaint of Transfer from [facility] (25 Feb 2021 09:10)    INTERVAL EVENTS: No interval events overnight. s/p Trach changed from 8Bivona > 6DCT. Tolerated TC during the day.     ROS: Seen by bedside during AM rounds     OBJECTIVE:  ICU Vital Signs Last 24 Hrs  T(C): 36.9 (26 Feb 2021 04:00), Max: 37 (25 Feb 2021 20:00)  T(F): 98.4 (26 Feb 2021 04:00), Max: 98.6 (25 Feb 2021 20:00)  HR: 98 (26 Feb 2021 04:43) (77 - 102)  BP: 145/59 (26 Feb 2021 04:00) (129/69 - 147/62)  BP(mean): 84 (26 Feb 2021 04:00) (83 - 86)  ABP: --  ABP(mean): --  RR: 14 (26 Feb 2021 04:00) (12 - 20)  SpO2: 96% (26 Feb 2021 04:43) (95% - 100%)    Mode: AC/ CMV (Assist Control/ Continuous Mandatory Ventilation), RR (machine): 12, TV (machine): 450, FiO2: 25, PEEP: 5, MAP: 10, PIP: 18    02-25 @ 07:01  -  02-26 @ 07:00  --------------------------------------------------------  IN: 2482 mL / OUT: 1325 mL / NET: 1157 mL    CAPILLARY BLOOD GLUCOSE  POCT Blood Glucose.: 130 mg/dL (26 Feb 2021 06:27)    PHYSICAL EXAM:  General: NAD, well groomed. (+) Obese.   HEENT: NC/ AT, PERRLA, Tracheostomy clean, dry and intact.   Cardio: RRR, S1/S2, no murmurs or rubs.   Pulm: CTA with mild upper airway congestion, cleared on suctioning   GI: Soft, NDNT, BS (+). PEG (+) Rectal Tube with improving diarrhea (+)   MS: No pedal edema. AROMI, but limited second to weakness.   Neuro: Awake and alert. Follow commands. No focal neurological deficits noted.   Skin: Warm and dry. No jaundice or cyanosis    Mode: AC/ CMV (Assist Control/ Continuous Mandatory Ventilation)  RR (machine): 12  TV (machine): 450  FiO2: 25  PEEP: 5  MAP: 10  PIP: 18    HOSPITAL MEDICATIONS:  MEDICATIONS  (STANDING):  ALBUTerol    90 MICROgram(s) HFA Inhaler 2 Puff(s) Inhalation every 6 hours  amLODIPine   Tablet 5 milliGRAM(s) Oral daily  ascorbic acid 500 milliGRAM(s) Oral daily  chlorhexidine 0.12% Liquid 15 milliLiter(s) Oral Mucosa every 12 hours  chlorhexidine 4% Liquid 1 Application(s) Topical daily  cholestyramine Powder (Sugar-Free) 4 Gram(s) Oral daily  clonazePAM  Tablet 0.5 milliGRAM(s) Oral daily  collagenase Ointment 1 Application(s) Topical two times a day  dextrose 40% Gel 15 Gram(s) Oral once  dextrose 5%. 1000 milliLiter(s) (50 mL/Hr) IV Continuous <Continuous>  dextrose 5%. 1000 milliLiter(s) (100 mL/Hr) IV Continuous <Continuous>  dextrose 50% Injectable 25 Gram(s) IV Push once  dextrose 50% Injectable 12.5 Gram(s) IV Push once  dextrose 50% Injectable 25 Gram(s) IV Push once  diphenoxylate/atropine 1 Tablet(s) Oral four times a day  glucagon  Injectable 1 milliGRAM(s) IntraMuscular once  insulin lispro (ADMELOG) corrective regimen sliding scale   SubCutaneous every 6 hours  lactobacillus acidophilus 1 Tablet(s) Oral two times a day  metoprolol tartrate 50 milliGRAM(s) Oral two times a day  multivitamin 1 Tablet(s) Oral daily  nystatin Powder 1 Application(s) Topical every 8 hours  pantoprazole  Injectable 40 milliGRAM(s) IV Push daily  rivaroxaban 20 milliGRAM(s) Oral with dinner  zinc sulfate 220 milliGRAM(s) Oral daily    MEDICATIONS  (PRN):  acetaminophen    Suspension .. 650 milliGRAM(s) Oral every 6 hours PRN Temp greater or equal to 38C (100.4F), Mild Pain (1 - 3), Moderate Pain (4 - 6)  artificial tears (preservative free) Ophthalmic Solution 1 Drop(s) Both EYES every 8 hours PRN Dry Eyes  guaiFENesin   Syrup  (Sugar-Free) 100 milliGRAM(s) Oral every 6 hours PRN Cough  oxyCODONE    Solution 5 milliGRAM(s) Oral every 4 hours PRN Severe Pain (7 - 10)    LABS:                        10.8   9.45  )-----------( 373      ( 25 Feb 2021 06:34 )             37.2     02-25    136  |  100  |  9   ----------------------------<  142<H>  3.8   |  23  |  0.28<L>    Ca    9.9      25 Feb 2021 06:34  Mg     1.7     02-25 CHIEF COMPLAINT: Patient is a 60y old  Female who presents with a chief complaint of Transfer from [facility] (25 Feb 2021 09:10)    INTERVAL EVENTS: No interval events overnight. s/p Trach changed from 8Bivona > 6DCT. Tolerated TC during the day.     ROS: Seen by bedside during AM rounds with no complaints. Tolerating 6DCT well with PMV.     OBJECTIVE:  ICU Vital Signs Last 24 Hrs  T(C): 36.9 (26 Feb 2021 04:00), Max: 37 (25 Feb 2021 20:00)  T(F): 98.4 (26 Feb 2021 04:00), Max: 98.6 (25 Feb 2021 20:00)  HR: 98 (26 Feb 2021 04:43) (77 - 102)  BP: 145/59 (26 Feb 2021 04:00) (129/69 - 147/62)  BP(mean): 84 (26 Feb 2021 04:00) (83 - 86)  ABP: --  ABP(mean): --  RR: 14 (26 Feb 2021 04:00) (12 - 20)  SpO2: 96% (26 Feb 2021 04:43) (95% - 100%)    Mode: AC/ CMV (Assist Control/ Continuous Mandatory Ventilation), RR (machine): 12, TV (machine): 450, FiO2: 25, PEEP: 5, MAP: 10, PIP: 18    02-25 @ 07:01  -  02-26 @ 07:00  --------------------------------------------------------  IN: 2482 mL / OUT: 1325 mL / NET: 1157 mL    CAPILLARY BLOOD GLUCOSE  POCT Blood Glucose.: 130 mg/dL (26 Feb 2021 06:27)    PHYSICAL EXAM:  General: NAD, well groomed. (+) Obese.   HEENT: NC/ AT and PERRLA  NECK: Tracheostomy clean, dry and intact.   Cardio: RRR, S1/S2, no murmurs or rubs.   Pulm: CTA with mild upper airway congestion, cleared on suctioning/ cough   GI: Soft, NDNT, BS (+). PEG (+) Rectal Tube with improving diarrhea (+)   MS: No pedal edema. AROMI, but limited second to weakness.   Neuro: Awake and alert. Follow commands. No focal neurological deficits noted.   Skin: Warm and dry. No jaundice or cyanosis    Mode: AC/ CMV (Assist Control/ Continuous Mandatory Ventilation)  RR (machine): 12  TV (machine): 450  FiO2: 25  PEEP: 5  MAP: 10  PIP: 18    HOSPITAL MEDICATIONS:  MEDICATIONS  (STANDING):  ALBUTerol    90 MICROgram(s) HFA Inhaler 2 Puff(s) Inhalation every 6 hours  amLODIPine   Tablet 5 milliGRAM(s) Oral daily  ascorbic acid 500 milliGRAM(s) Oral daily  chlorhexidine 0.12% Liquid 15 milliLiter(s) Oral Mucosa every 12 hours  chlorhexidine 4% Liquid 1 Application(s) Topical daily  cholestyramine Powder (Sugar-Free) 4 Gram(s) Oral daily  clonazePAM  Tablet 0.5 milliGRAM(s) Oral daily  collagenase Ointment 1 Application(s) Topical two times a day  dextrose 40% Gel 15 Gram(s) Oral once  dextrose 5%. 1000 milliLiter(s) (50 mL/Hr) IV Continuous <Continuous>  dextrose 5%. 1000 milliLiter(s) (100 mL/Hr) IV Continuous <Continuous>  dextrose 50% Injectable 25 Gram(s) IV Push once  dextrose 50% Injectable 12.5 Gram(s) IV Push once  dextrose 50% Injectable 25 Gram(s) IV Push once  diphenoxylate/atropine 1 Tablet(s) Oral four times a day  glucagon  Injectable 1 milliGRAM(s) IntraMuscular once  insulin lispro (ADMELOG) corrective regimen sliding scale   SubCutaneous every 6 hours  lactobacillus acidophilus 1 Tablet(s) Oral two times a day  metoprolol tartrate 50 milliGRAM(s) Oral two times a day  multivitamin 1 Tablet(s) Oral daily  nystatin Powder 1 Application(s) Topical every 8 hours  pantoprazole  Injectable 40 milliGRAM(s) IV Push daily  rivaroxaban 20 milliGRAM(s) Oral with dinner  zinc sulfate 220 milliGRAM(s) Oral daily    MEDICATIONS  (PRN):  acetaminophen    Suspension .. 650 milliGRAM(s) Oral every 6 hours PRN Temp greater or equal to 38C (100.4F), Mild Pain (1 - 3), Moderate Pain (4 - 6)  artificial tears (preservative free) Ophthalmic Solution 1 Drop(s) Both EYES every 8 hours PRN Dry Eyes  guaiFENesin   Syrup  (Sugar-Free) 100 milliGRAM(s) Oral every 6 hours PRN Cough  oxyCODONE    Solution 5 milliGRAM(s) Oral every 4 hours PRN Severe Pain (7 - 10)    LABS:                        10.8   9.45  )-----------( 373      ( 25 Feb 2021 06:34 )             37.2     02-25    136  |  100  |  9   ----------------------------<  142<H>  3.8   |  23  |  0.28<L>    Ca    9.9      25 Feb 2021 06:34  Mg     1.7     02-25

## 2021-02-26 NOTE — PROGRESS NOTE ADULT - ASSESSMENT
59 YO F with PMHx of HTN, DVT on Xarelto, Peritonitis s/p Oral's Procedure and Ileostomy (reversed in 2011) and AARON who presented to [facility] on 11/18 for AHRF second to COVID 19, intubated on 11/23 complicated by R lung abscesses on CT CHEST 12/5, multibacterial PNA and bacteremia, ARTEMIO s/p CRRT and HD, and s/p Tracheostomy 12/18 c/b track leak and s/p OR 8Bivona. Transferred to [facility]CU for furhter management.     # AMS/ Anxiety   - AOX3 at baseline.   - CTH and EEG WNL   - Anxiety noted and Klonopin started.   - Supportive care.     # ARDS second to COVID vs Multi-bacterial PNA/ R Lung Abscess  - Intubated 11/23 and c/b multi-bacterial PNA and lung abscesses.   - s/p Tracheostomy on 12/18 by CTSx. Sutures out 1/1  - Air leak noted and s/p Bivona with CTSx 12/31. Now downsized to 6DCT 2/25  - Tolerating TC with PMV QD and Vent QHS .   - Proventil, IPV and Chest PT Q6H and Suction PRN. Trach Care QD.     # Vasoplegic vs Septic Shock   - Continue on Norvasc and Metoprolol.   - On full AC for Hx of DVT. CTA CHEST with no PEs.     # ARTEMIO s/p CRRT and HD   - ARTEMIO now resolved and no longer required HD.   - Hypokalemia in setting of diarrhea/ CDIFF. Monitor electrolytes.   - Monitor renal function and avoid nephrotoxins.     # GI   - s/p PEG placement with IR 1/11   - Continue on TF and monitor tolerance.     # Sepsis second to COVID vs Multi-bacterial PNA/ Bacteremia and R Lung Abscess vs CDIFF (+)   - COVID with superimposed multi-bacterial VAP vs aspiration PNA vs cavitary PNA.   - SCx (11/24) MSSA and BCx (11/27) with MSSA and Proteus Bacteremia  - SCx (12/1, 12/12 and 12/27) with Stenotrophomonas.   - SCx (1/9) with  Pseudomonas and Stenotrophomonas   - BCX has been persistently negative from 12/1, and most recently 1/12   - CT CHEST 12/4 with right lung cavitation.   - s/p multiple antibiotics, but now completed Fortaz and Flagyl (1/12-1/16)  - RPT CT CHEST 1/28 with improvement of right lung abscess   - SCx with  Pseudomonas and Stenotrophomonas and s/p TED/ Fortaz (1/28-2/6)    # CDIFF (+)   - Started on PO Vancomycin 2/1-2/25 with no improvement in diarrhea.   - Cholestyramine attempted, but failed.   - No further Vancomycin to be given. Lomotil QID trial.   - ID following     # DM2   - Continue on ISS and monitor FS. Adjust insulin as needed.     # Hx of DVT   - Continue on Xarelto.     # Skin/ Facial Wounds   - WOC recommendations appreciated   - Plastics evaluation appreciated    # Contracted Wrists   - PT/OT working on strength.     # DVT/ GI PPX with FULL AC/ PPI   # CODE STATUS - FULL CODE   # DISPO - PT recc Rehab/ Vent Facility    61 YO F with PMHx of HTN, PE/DVT on Xarelto, Peritonitis s/p Oral's Procedure and Ileostomy (reversed in 2011) and OHS on BIPAP 16/9 QHS outpatient who presented to [facility] on 11/18 for AHRF second to COVID 19, intubated on 11/23 complicated by R lung abscesses on CT CHEST 12/5, multibacterial PNA and bacteremia, ARTEMIO s/p CRRT and HD, and s/p Tracheostomy 12/18 c/b track leak and s/p OR 8Bivona. Transferred to [facility]CU for furhter management.     # AMS/ Anxiety   - AOX3 at baseline.   - CTH and EEG WNL   - Anxiety noted and Klonopin started.   - Supportive care.     # ARDS second to COVID vs Multi-bacterial PNA/ R Lung Abscess  - Intubated 11/23 and c/b multi-bacterial PNA and lung abscesses.   - s/p Tracheostomy on 12/18 by CTSx. Sutures out 1/1  - Air leak noted and s/p Bivona with CTSx 12/31. Now downsized to 6DCT 2/25  - Tolerating TC with PMV QD and PS 16/9 QHS .   - Proventil and Chest PT Q6H and Suction PRN. Trach Care QD.     # Vasoplegic vs Septic Shock   - Continue on Norvasc and Metoprolol.   - On full AC for Hx of DVT. CTA CHEST with no PEs.     # ARTEMIO s/p CRRT and HD   - ARTEMIO now resolved and no longer required HD.   - Hypokalemia in setting of diarrhea/ CDIFF. Monitor electrolytes.   - Monitor renal function and avoid nephrotoxins.     # GI   - s/p PEG placement with IR 1/11   - Continue on TF and monitor tolerance.     # Sepsis second to COVID vs Multi-bacterial PNA/ Bacteremia and R Lung Abscess vs CDIFF (+)   - COVID with superimposed multi-bacterial VAP vs aspiration PNA vs cavitary PNA.   - SCx (11/24) MSSA and BCx (11/27) with MSSA and Proteus Bacteremia  - SCx (12/1, 12/12 and 12/27) with Stenotrophomonas.   - SCx (1/9) with  Pseudomonas and Stenotrophomonas   - BCX has been persistently negative from 12/1, and most recently 1/12   - CT CHEST 12/4 with right lung cavitation.   - s/p multiple antibiotics, but now completed Fortaz and Flagyl (1/12-1/16)  - RPT CT CHEST 1/28 with improvement of right lung abscess   - SCx with  Pseudomonas and Stenotrophomonas and s/p TED/ Fortaz (1/28-2/6)    # CDIFF (+)   - Started on PO Vancomycin 2/1-2/25 with no improvement in diarrhea.   - Cholestyramine attempted, but failed.   - No further Vancomycin to be given. Lomotil QID trial.   - ID following     # DM2   - Continue on ISS and monitor FS. Adjust insulin as needed.     # Hx of DVT   - Continue on Xarelto.     # Skin/ Facial Wounds   - WOC recommendations appreciated   - Plastics evaluation appreciated    # Contracted Wrists   - PT/OT working on strength.     # DVT/ GI PPX with FULL AC/ PPI   # CODE STATUS - FULL CODE   # DISPO - PT recc Rehab/ Vent Facility    61 YO F with PMHx of HTN, PE/DVT on Xarelto, Peritonitis s/p Oral's Procedure and Ileostomy (reversed in 2011) and OHS on BIPAP 16/9 QHS outpatient who presented to [facility] on 11/18 for AHRF second to COVID 19, intubated on 11/23 complicated by R lung abscesses on CT CHEST 12/5, multibacterial PNA and bacteremia, ARTEMIO s/p CRRT and HD, and s/p Tracheostomy 12/18 c/b track leak and s/p OR 8Bivona. Transferred to [facility]CU for furhter management.     # AMS/ Anxiety   - AOX3 at baseline.   - CTH and EEG WNL   - Anxiety noted and Klonopin started.   - Supportive care.     # ARDS second to COVID vs Multi-bacterial PNA/ R Lung Abscess  - Intubated 11/23 and c/b multi-bacterial PNA and lung abscesses.   - s/p Tracheostomy on 12/18 by CTSx. Sutures out 1/1  - Air leak noted and s/p Bivona with CTSx 12/31. Now downsized to 6DCT 2/25  - Tolerating TC with PMV QD and PS 16/9 QHS .   - Proventil and Chest PT Q6H and Suction PRN. Trach Care QD.     # Vasoplegic vs Septic Shock   - Continue on Norvasc and Metoprolol.   - On full AC for Hx of DVT. CTA CHEST with no PEs.     # ARTEMIO s/p CRRT and HD   - ARTEMIO now resolved and no longer required HD.   - Hypokalemia in setting of diarrhea/ CDIFF. Monitor electrolytes.   - Monitor renal function and avoid nephrotoxins.     # GI   - s/p PEG placement with IR 1/11   - Continue on TF and monitor tolerance.     # Sepsis second to COVID vs Multi-bacterial PNA/ Bacteremia and R Lung Abscess vs CDIFF (+)   - COVID with superimposed multi-bacterial VAP vs aspiration PNA vs cavitary PNA.   - SCx (11/24) MSSA and BCx (11/27) with MSSA and Proteus Bacteremia  - SCx (12/1, 12/12 and 12/27) with Stenotrophomonas.   - SCx (1/9) with  Pseudomonas and Stenotrophomonas   - BCX has been persistently negative from 12/1, and most recently 1/12   - CT CHEST 12/4 with right lung cavitation.   - s/p multiple antibiotics, but now completed Fortaz and Flagyl (1/12-1/16)  - RPT CT CHEST 1/28 with improvement of right lung abscess   - SCx with  Pseudomonas and Stenotrophomonas and s/p TED/ Fortaz (1/28-2/6)    # CDIFF (+)   - Started on PO Vancomycin 2/1-2/25 with no improvement in diarrhea.   - Cholestyramine attempted, but failed.   - Stool O&P and GI PCR NGTD. Check Stool Cx. c/w Lomotil QID trial for now.   - ID following     # DM2   - Continue on ISS and monitor FS. Adjust insulin as needed.     # Hx of DVT   - Continue on Xarelto.     # Skin/ Facial Wounds   - WOC recommendations appreciated   - Plastics evaluation appreciated, pending re-evaluation     # Contracted Wrists   - PT/OT working on strength.     # DVT/ GI PPX with FULL AC/ PPI   # CODE STATUS - FULL CODE   # DISPO - PT recc Rehab/ Vent Facility

## 2021-02-26 NOTE — PROGRESS NOTE ADULT - MINUTES
35
35
25
35
45
35
45
35
45
45
35
45
35
25
35

## 2021-02-27 LAB
GLUCOSE BLDC GLUCOMTR-MCNC: 111 MG/DL — HIGH (ref 70–99)
GLUCOSE BLDC GLUCOMTR-MCNC: 128 MG/DL — HIGH (ref 70–99)
GLUCOSE BLDC GLUCOMTR-MCNC: 136 MG/DL — HIGH (ref 70–99)
GLUCOSE BLDC GLUCOMTR-MCNC: 142 MG/DL — HIGH (ref 70–99)
GLUCOSE BLDC GLUCOMTR-MCNC: 164 MG/DL — HIGH (ref 70–99)

## 2021-02-27 PROCEDURE — 99233 SBSQ HOSP IP/OBS HIGH 50: CPT | Mod: GC

## 2021-02-27 RX ORDER — SODIUM BICARBONATE 1 MEQ/ML
650 SYRINGE (ML) INTRAVENOUS ONCE
Refills: 0 | Status: DISCONTINUED | OUTPATIENT
Start: 2021-02-27 | End: 2021-03-05

## 2021-02-27 RX ORDER — CHLORHEXIDINE GLUCONATE 213 G/1000ML
15 SOLUTION TOPICAL EVERY 12 HOURS
Refills: 0 | Status: DISCONTINUED | OUTPATIENT
Start: 2021-02-27 | End: 2021-03-02

## 2021-02-27 RX ADMIN — Medication 1 TABLET(S): at 01:21

## 2021-02-27 RX ADMIN — CHLORHEXIDINE GLUCONATE 15 MILLILITER(S): 213 SOLUTION TOPICAL at 17:32

## 2021-02-27 RX ADMIN — Medication 1 TABLET(S): at 17:32

## 2021-02-27 RX ADMIN — ALBUTEROL 2 PUFF(S): 90 AEROSOL, METERED ORAL at 16:45

## 2021-02-27 RX ADMIN — NYSTATIN CREAM 1 APPLICATION(S): 100000 CREAM TOPICAL at 06:26

## 2021-02-27 RX ADMIN — ALBUTEROL 2 PUFF(S): 90 AEROSOL, METERED ORAL at 03:52

## 2021-02-27 RX ADMIN — Medication 1 APPLICATION(S): at 06:01

## 2021-02-27 RX ADMIN — Medication 1 APPLICATION(S): at 17:33

## 2021-02-27 RX ADMIN — Medication 1 TABLET(S): at 06:13

## 2021-02-27 RX ADMIN — AMLODIPINE BESYLATE 5 MILLIGRAM(S): 2.5 TABLET ORAL at 06:00

## 2021-02-27 RX ADMIN — Medication 1 TABLET(S): at 11:19

## 2021-02-27 RX ADMIN — Medication 0.5 MILLIGRAM(S): at 11:19

## 2021-02-27 RX ADMIN — ZINC SULFATE TAB 220 MG (50 MG ZINC EQUIVALENT) 220 MILLIGRAM(S): 220 (50 ZN) TAB at 11:19

## 2021-02-27 RX ADMIN — Medication 1 TABLET(S): at 06:02

## 2021-02-27 RX ADMIN — NYSTATIN CREAM 1 APPLICATION(S): 100000 CREAM TOPICAL at 23:17

## 2021-02-27 RX ADMIN — RIVAROXABAN 20 MILLIGRAM(S): KIT at 17:32

## 2021-02-27 RX ADMIN — CHLORHEXIDINE GLUCONATE 15 MILLILITER(S): 213 SOLUTION TOPICAL at 06:13

## 2021-02-27 RX ADMIN — PANTOPRAZOLE SODIUM 40 MILLIGRAM(S): 20 TABLET, DELAYED RELEASE ORAL at 11:19

## 2021-02-27 RX ADMIN — Medication 50 MILLIGRAM(S): at 17:32

## 2021-02-27 RX ADMIN — ALBUTEROL 2 PUFF(S): 90 AEROSOL, METERED ORAL at 21:15

## 2021-02-27 RX ADMIN — Medication 650 MILLIGRAM(S): at 11:18

## 2021-02-27 RX ADMIN — ALBUTEROL 2 PUFF(S): 90 AEROSOL, METERED ORAL at 10:46

## 2021-02-27 RX ADMIN — CHLORHEXIDINE GLUCONATE 1 APPLICATION(S): 213 SOLUTION TOPICAL at 11:19

## 2021-02-27 RX ADMIN — Medication 2: at 11:30

## 2021-02-27 RX ADMIN — NYSTATIN CREAM 1 APPLICATION(S): 100000 CREAM TOPICAL at 14:46

## 2021-02-27 RX ADMIN — CHOLESTYRAMINE 4 GRAM(S): 4 POWDER, FOR SUSPENSION ORAL at 11:19

## 2021-02-27 RX ADMIN — Medication 50 MILLIGRAM(S): at 06:00

## 2021-02-27 NOTE — PROGRESS NOTE ADULT - ASSESSMENT
59 YO F with PMHx of HTN, PE/DVT on Xarelto, Peritonitis s/p Oral's Procedure and Ileostomy (reversed in 2011) and OHS on BIPAP 16/9 QHS outpatient who presented to [facility] on 11/18 for AHRF second to COVID 19, intubated on 11/23 complicated by R lung abscesses on CT CHEST 12/5, multibacterial PNA and bacteremia, ARTEMIO s/p CRRT and HD, and s/p Tracheostomy 12/18 c/b track leak and s/p OR 8Bivona. Transferred to RCU for furhter management.     # AMS/ Anxiety   - AOX3 at baseline.   - CTH and EEG WNL   - Anxiety noted and Klonopin started.   - Supportive care.     # ARDS second to COVID vs Multi-bacterial PNA/ R Lung Abscess  - Intubated 11/23 and c/b multi-bacterial PNA and lung abscesses.   - s/p Tracheostomy on 12/18 by CTSx. Sutures out 1/1  - Air leak noted and s/p Bivona with CTSx 12/31. Now downsized to 6DCT 2/25  - Tolerating TC with PMV QD and PS 16/9 QHS .   - Proventil and Chest PT Q6H and Suction PRN. Trach Care QD.     # Vasoplegic vs Septic Shock   - Continue on Norvasc and Metoprolol.   - On full AC for Hx of DVT. CTA CHEST with no PEs.     # ARTEMIO s/p CRRT and HD   - ARTEMIO now resolved and no longer required HD.   - Hypokalemia in setting of diarrhea/ CDIFF. Monitor electrolytes.   - Monitor renal function and avoid nephrotoxins.     # GI   - s/p PEG placement with IR 1/11   - Continue on TF and monitor tolerance.     # Sepsis second to COVID vs Multi-bacterial PNA/ Bacteremia and R Lung Abscess vs CDIFF (+)   - COVID with superimposed multi-bacterial VAP vs aspiration PNA vs cavitary PNA.   - SCx (11/24) MSSA and BCx (11/27) with MSSA and Proteus Bacteremia  - SCx (12/1, 12/12 and 12/27) with Stenotrophomonas.   - SCx (1/9) with  Pseudomonas and Stenotrophomonas   - BCX has been persistently negative from 12/1, and most recently 1/12   - CT CHEST 12/4 with right lung cavitation.   - s/p multiple antibiotics, but now completed Fortaz and Flagyl (1/12-1/16)  - RPT CT CHEST 1/28 with improvement of right lung abscess   - SCx with  Pseudomonas and Stenotrophomonas and s/p TED/ Fortaz (1/28-2/6)    # CDIFF (+)   - Started on PO Vancomycin 2/1-2/25 with no improvement in diarrhea.   - Cholestyramine attempted, but failed.   - Stool O&P and GI PCR NGTD. Check Stool Cx. c/w Lomotil QID trial for now.   - ID following     # DM2   - Continue on ISS and monitor FS. Adjust insulin as needed.     # Hx of DVT   - Continue on Xarelto.     # Skin/ Facial Wounds   - WOC recommendations appreciated   - Plastics evaluation appreciated, pending re-evaluation     # Contracted Wrists   - PT/OT working on strength.     # DVT/ GI PPX with FULL AC/ PPI   # CODE STATUS - FULL CODE   # DISPO - PT recc Rehab/ Vent Facility

## 2021-02-27 NOTE — PROGRESS NOTE ADULT - ATTENDING COMMENTS
60F with PMHx as above presenting to [facility] for hypoxemic respiratory failure with ARDS 2/2 COVID19 PNA further c/b superimposed MSSA cavitary PNA and ARF requiring HD. Pt with failure to wean from mechanical ventilation, s/p tracheostomy placement 12/18 and transfer to RCU 12/28.     1. Neuro - encephalopathy resolved   OOB to chair as tolerated. Moves all 4 extremities independently, but remains physically severely deconditioned and dependent of ADLs likely 2/2 critical illness polyneuropathy.     2. Pulm - trach placement (CTSx, 12/18 Proximal 7XLT and then changed to 8Fr Bivona 12/31 for persistent air leak and downsized to #6 cuffed Portex 2/25), now with resolution of air leak and significant improvement in ventilator synchrony. Daily SBTs as tolerated. Pt tolerating trach collar QD, still requiring PSV qhs (pt with home NIV Bilevel 16/9 for chronic hypercapnia 2/2 OHS, but does not have AARON by PSG). Pt able to tolerate PMV most of the day. Will c/w airway clearance therapy and trach care as per RCU team. Will continue to wean as tolerated.     3. ID - COVID19 PNA but with hospital course c/b MSSA bacteremia with cavitary PNA s/p completion of Abx with Primaxin on 12/28 followed by course of Ceftazidime through 1/5 for Stenotrophomonas PNA followed by VAP 2/2 Stenotrophomonas and Pseudomonas 1/12 s/p completion of Flagyl+Ceftazidime+Vancomycin with short-term clinical improvement followed by recurrent of VAP 2/2 CRE Pseudomonas/Stenotrophomonas, s/p eradication therapy with IV+Nebulized Abx 1/28-2/6. Repeat CT Chest performed 12/4 showed significantly improved cavitary lesions from prior MSSA PNA without area requiring further source control. Hospital course then c/b CDiff colitis 2/1, now on PO Vancomycin with improvement in diarrhea but persistent loose stools. GI and ID recs appreciated, persistent diarrhea unlikely 2/2 CDiff at this time. Stool cx PCR NTD, loperamide and cholestyramine started 2/25. Will monitor stool output.    4. GI - oropharyngeal dysphagia, IR guided PEG successfully placed 1/11. Pt tolerating PEG feeds.     5. Renal - ARF on CRRT, following by intermittent HD (last session 12/17) with renal recovery.     6. Endo - NPH+HISS for glucose control.     7. Derm - Wound consult and plastic sx recs appreciated.    Dispo pending medical optimization, likely ROJELIO.     I was physically present for the key portions of the evaluation and management (E/M) service provided.  I agree with the above history, physical, and plan which I have reviewed and edited where appropriate.     35 minutes spent on total encounter; more than 50% of the visit was spent counseling and/or coordinating care by the attending physician.     Plan discussed with RCU Team.

## 2021-02-27 NOTE — PROGRESS NOTE ADULT - ASSESSMENT
60 year old female with prolonged hospital course secondary to COVID 19, GI consulted for persistent diarrhea     Problem/Recommendation - 1:  Problem: COVID-19. Recommendation: status post treatment by infectious disease  now off of isolation.     Problem/Recommendation - 2:  ·  Problem: Respiratory failure.  Recommendation: status post tracheostomy, now downsized, on trach collar.      Problem/Recommendation - 3:  ·  Problem: Diarrhea.  Recommendation: seems somewhat improved on current regimen, no stool recorded today. If recurs can add loperamide vs. continue rectal tube     Problem/Recommendation - 4:  ·  Problem: Hernia, incisional.  Recommendation: reducible, nontender. No signs of incarceration. Randi has hx of bowel resection, but she cannot provide the exact circumstances.  -serial abdominal exams.      Problem/Recommendation - 5:  ·  Problem: Gastrostomy present.  Recommendation: functioning well.      Problem/Recommendation - 6:  Problem: ARTEMIO (acute kidney injury). Recommendation: s/p CVVHD, now improved.     Problem/Recommendation - 7:  Problem: History of pressure ulcer. Recommendation: per RCU team.     Problem/Recommendation - 8:  Problem: Clostridium difficile infection. Recommendation: status post 2 weeks of PO vanco  -vanco per ID on hold.    Attending Attestation:   Differential diagnosis and plan of care discussed with patient after the evaluation  75 Minutes spent on total encounter of which more than fifty percent of the encounter was spent counseling and/or coordinating care by the attending physician.      Ranjeet Ponce M.D.   Gastroenterology and Hepatology  Cell: [phone].

## 2021-02-27 NOTE — PROGRESS NOTE ADULT - SUBJECTIVE AND OBJECTIVE BOX
Chief Complaint:  Patient is a 60y old  Female who presents with a chief complaint of Transfer from [facility] (2021 08:15)      Interval Events:   no acute events  no stool output recorded for today    Allergies:  Kiwi (Unknown)  latex (Anaphylaxis)  latex (Unknown)  penicillin (Other)  penicillin (Unknown)  perfume  hives (Other)  potassium acetate (Other)  soap additives hives (Other)      Hospital Medications:  acetaminophen    Suspension .. 650 milliGRAM(s) Oral every 6 hours PRN  ALBUTerol    90 MICROgram(s) HFA Inhaler 2 Puff(s) Inhalation every 6 hours  amLODIPine   Tablet 5 milliGRAM(s) Oral daily  artificial tears (preservative free) Ophthalmic Solution 1 Drop(s) Both EYES every 8 hours PRN  chlorhexidine 0.12% Liquid 15 milliLiter(s) Oral Mucosa every 12 hours  chlorhexidine 4% Liquid 1 Application(s) Topical daily  cholestyramine Powder (Sugar-Free) 4 Gram(s) Oral two times a day  clonazePAM  Tablet 0.5 milliGRAM(s) Oral daily  collagenase Ointment 1 Application(s) Topical two times a day  dextrose 40% Gel 15 Gram(s) Oral once  dextrose 5%. 1000 milliLiter(s) IV Continuous <Continuous>  dextrose 5%. 1000 milliLiter(s) IV Continuous <Continuous>  dextrose 50% Injectable 25 Gram(s) IV Push once  dextrose 50% Injectable 12.5 Gram(s) IV Push once  dextrose 50% Injectable 25 Gram(s) IV Push once  diphenoxylate/atropine 1 Tablet(s) Oral four times a day  glucagon  Injectable 1 milliGRAM(s) IntraMuscular once  guaiFENesin   Syrup  (Sugar-Free) 100 milliGRAM(s) Oral every 6 hours PRN  insulin lispro (ADMELOG) corrective regimen sliding scale   SubCutaneous every 6 hours  lactobacillus acidophilus 1 Tablet(s) Oral two times a day  metoprolol tartrate 50 milliGRAM(s) Oral two times a day  multivitamin 1 Tablet(s) Oral daily  nystatin Powder 1 Application(s) Topical every 8 hours  oxyCODONE    Solution 5 milliGRAM(s) Oral every 4 hours PRN  pantoprazole  Injectable 40 milliGRAM(s) IV Push daily  rivaroxaban 20 milliGRAM(s) Oral with dinner  zinc sulfate 220 milliGRAM(s) Oral daily      PMHX/PSHX:  Pneumomediastinum    Umbilical hernia    Osteoarthritis of both knees, unspecified osteoarthritis type    AARON (Obstructive Sleep Apnea)    Pneumonia    S/P Colon Resection    pt with allergies to perfumes and addidtive to soaps, requires hypoallergenic sheets and gowns, line    Cervical Dysplasia    Latex Sensitivity    GERD (Gastroesophageal Reflux Disease)    Asthma    Anemia    S/P Joint Replacement    DVT (Deep Venous Thrombosis)    HTN (Hypertension)    H/O ileostomy    S/P LEEP    S/P Exploratory Laparotomy    S/P Colonoscopy    S/P Endoscopy    S/P  Section    History of Tubal Ligation    S/P Knee Surgery    Umbilical Hernia    Osteoarthritis of Knee    HTN (Hypertension)        Family history:  Family history of gastric cancer    Family history of breast cancer (Grandparent)        ROS:     General:  No wt loss, fevers, chills, night sweats, fatigue,   Eyes:  Good vision, no reported pain  ENT:  No sore throat, pain, runny nose, dysphagia  CV:  No pain, palpitations, hypo/hypertension  Resp:  No dyspnea, cough, tachypnea, wheezing  GI:  See HPI  :  No pain, bleeding, incontinence, nocturia  Muscle:  No pain, weakness  Neuro:  No weakness, tingling, memory problems  Psych:  No fatigue, insomnia, mood problems, depression  Endocrine:  No polyuria, polydipsia, cold/heat intolerance  Heme:  No petechiae, ecchymosis, easy bruisability  Skin:  No rash, edema      PHYSICAL EXAM:     GENERAL:  Appears stated age, well-groomed, well-nourished, no distress  HEENT:  NC/AT,  conjunctivae clear, sclera-anicteric  NECK: Trachea midline, supple  CHEST:  Full & symmetric excursion, no increased effort, breath sounds clear  HEART:  Regular rhythm, no lola/heave  ABDOMEN:  Soft, non-tender, non-distended, normoactive bowel sounds,  no masses ,no hepato-splenomegaly, incisional hernia, g tube tender  EXTREMITIES:  no cyanosis,clubbing or edema  SKIN:  No rash/erythema/petechiae, no jaundice  NEURO:  Alert, oriented, no asterixis  RECTAL: Deferred

## 2021-02-27 NOTE — PROGRESS NOTE ADULT - SUBJECTIVE AND OBJECTIVE BOX
CHIEF COMPLAINT:    Interval Events:    REVIEW OF SYSTEMS:  Constitutional:   Eyes:  ENT:  CV:  Resp:  GI:  :  MSK:  Integumentary:  Neurological:  Psychiatric:  Endocrine:  Hematologic/Lymphatic:  Allergic/Immunologic:  [ ] All other systems negative  [ ] Unable to assess ROS because ________    OBJECTIVE:  ICU Vital Signs Last 24 Hrs  T(C): 36.1 (27 Feb 2021 05:59), Max: 37.1 (26 Feb 2021 23:06)  T(F): 96.9 (27 Feb 2021 05:59), Max: 98.7 (26 Feb 2021 23:06)  HR: 96 (27 Feb 2021 05:59) (84 - 105)  BP: 134/58 (27 Feb 2021 05:59) (122/60 - 139/62)  BP(mean): 3 (27 Feb 2021 05:59) (3 - 3)  ABP: --  ABP(mean): --  RR: 22 (27 Feb 2021 05:59) (19 - 22)  SpO2: 98% (27 Feb 2021 05:59) (97% - 100%)    Mode: AC/ CMV (Assist Control/ Continuous Mandatory Ventilation), RR (machine): 12, TV (machine): 450, FiO2: 25, PEEP: 5, MAP: 13, PIP: 24    02-26 @ 07:01  -  02-27 @ 07:00  --------------------------------------------------------  IN: 1340 mL / OUT: 650 mL / NET: 690 mL      CAPILLARY BLOOD GLUCOSE      POCT Blood Glucose.: 142 mg/dL (27 Feb 2021 05:00)      PHYSICAL EXAM:  General:   HEENT:   Lymph Nodes:  Neck:   Respiratory:   Cardiovascular:   Abdomen:   Extremities:   Skin:   Neurological:  Psychiatry:    HOSPITAL MEDICATIONS:  MEDICATIONS  (STANDING):  ALBUTerol    90 MICROgram(s) HFA Inhaler 2 Puff(s) Inhalation every 6 hours  amLODIPine   Tablet 5 milliGRAM(s) Oral daily  chlorhexidine 0.12% Liquid 15 milliLiter(s) Oral Mucosa every 12 hours  chlorhexidine 4% Liquid 1 Application(s) Topical daily  cholestyramine Powder (Sugar-Free) 4 Gram(s) Oral two times a day  clonazePAM  Tablet 0.5 milliGRAM(s) Oral daily  collagenase Ointment 1 Application(s) Topical two times a day  dextrose 40% Gel 15 Gram(s) Oral once  dextrose 5%. 1000 milliLiter(s) (50 mL/Hr) IV Continuous <Continuous>  dextrose 5%. 1000 milliLiter(s) (100 mL/Hr) IV Continuous <Continuous>  dextrose 50% Injectable 25 Gram(s) IV Push once  dextrose 50% Injectable 12.5 Gram(s) IV Push once  dextrose 50% Injectable 25 Gram(s) IV Push once  diphenoxylate/atropine 1 Tablet(s) Oral four times a day  glucagon  Injectable 1 milliGRAM(s) IntraMuscular once  insulin lispro (ADMELOG) corrective regimen sliding scale   SubCutaneous every 6 hours  lactobacillus acidophilus 1 Tablet(s) Oral two times a day  metoprolol tartrate 50 milliGRAM(s) Oral two times a day  multivitamin 1 Tablet(s) Oral daily  nystatin Powder 1 Application(s) Topical every 8 hours  pantoprazole  Injectable 40 milliGRAM(s) IV Push daily  rivaroxaban 20 milliGRAM(s) Oral with dinner  zinc sulfate 220 milliGRAM(s) Oral daily    MEDICATIONS  (PRN):  acetaminophen    Suspension .. 650 milliGRAM(s) Oral every 6 hours PRN Temp greater or equal to 38C (100.4F), Mild Pain (1 - 3), Moderate Pain (4 - 6)  artificial tears (preservative free) Ophthalmic Solution 1 Drop(s) Both EYES every 8 hours PRN Dry Eyes  guaiFENesin   Syrup  (Sugar-Free) 100 milliGRAM(s) Oral every 6 hours PRN Cough  oxyCODONE    Solution 5 milliGRAM(s) Oral every 4 hours PRN Severe Pain (7 - 10)      LABS:                    MICROBIOLOGY:     RADIOLOGY:  [ ] Reviewed and interpreted by me    PULMONARY FUNCTION TESTS:    EKG: CHIEF COMPLAINT:    Interval Events:    CONSTITUTIONAL: denies fever, chills, pain, or night sweats.   EYES: denies eye pain or blurry vision.  ENT: denies ear pain, nose bleed or sore throat.  NECK: denies neck pain or stiffness.  RESPIRATORY: denies SOB, wheezing. + productive cough  CV: denies chest pain or palpitation.  GI: denies abdominal pain, N/V, constipation, melena or hematochezia. + diarrhea   : denies dysuria, hematuria or frequency.   NEUROLOGICAL: denies headache, weakness, numbness or tingling sensation.  SKIN: denies itching, burning or rashes.   MUSCULOSKELETAL: denies joint pain, swelling or muscle pain.   HEME: denies easy bruising, or bleeding gums     OBJECTIVE:  ICU Vital Signs Last 24 Hrs  T(C): 36.1 (27 Feb 2021 05:59), Max: 37.1 (26 Feb 2021 23:06)  T(F): 96.9 (27 Feb 2021 05:59), Max: 98.7 (26 Feb 2021 23:06)  HR: 96 (27 Feb 2021 05:59) (84 - 105)  BP: 134/58 (27 Feb 2021 05:59) (122/60 - 139/62)  BP(mean): 3 (27 Feb 2021 05:59) (3 - 3)  RR: 22 (27 Feb 2021 05:59) (19 - 22)  SpO2: 98% (27 Feb 2021 05:59) (97% - 100%)    Mode: AC/ CMV (Assist Control/ Continuous Mandatory Ventilation), RR (machine): 12, TV (machine): 450, FiO2: 25, PEEP: 5, MAP: 13, PIP: 24    02-26 @ 07:01  -  02-27 @ 07:00  --------------------------------------------------------  IN: 1340 mL / OUT: 650 mL / NET: 690 mL      CAPILLARY BLOOD GLUCOSE      POCT Blood Glucose.: 142 mg/dL (27 Feb 2021 05:00)    PHYSICAL EXAM:  GENERAL: NAD, well-developed, obese.  HEAD:  Atraumatic, Normocephalic  EYES: EOMI, PERRLA, conjunctiva and sclera clear  NECK: Supple, No JVD, tracheostomy site C/D/I.   CHEST/LUNG: Mildly coarse to upper bases on auscultation bilaterally; No wheezing present.  HEART: Regular rate and rhythm; No murmurs, rubs, or gallops  ABDOMEN: Soft, Nontender, Nondistended; Bowel sounds present, + PEG, + Rectal tube.   EXTREMITIES:  2+ Peripheral Pulses, +2 edema on b/l feet. No clubbing, cyanosis.  NEUROLOGY: A&0x3, non-focal, no neurological deficits   SKIN: No rashes or lesions      HOSPITAL MEDICATIONS:  MEDICATIONS  (STANDING):  ALBUTerol    90 MICROgram(s) HFA Inhaler 2 Puff(s) Inhalation every 6 hours  amLODIPine   Tablet 5 milliGRAM(s) Oral daily  chlorhexidine 0.12% Liquid 15 milliLiter(s) Oral Mucosa every 12 hours  chlorhexidine 4% Liquid 1 Application(s) Topical daily  cholestyramine Powder (Sugar-Free) 4 Gram(s) Oral two times a day  clonazePAM  Tablet 0.5 milliGRAM(s) Oral daily  collagenase Ointment 1 Application(s) Topical two times a day  dextrose 40% Gel 15 Gram(s) Oral once  dextrose 5%. 1000 milliLiter(s) (50 mL/Hr) IV Continuous <Continuous>  dextrose 5%. 1000 milliLiter(s) (100 mL/Hr) IV Continuous <Continuous>  dextrose 50% Injectable 25 Gram(s) IV Push once  dextrose 50% Injectable 12.5 Gram(s) IV Push once  dextrose 50% Injectable 25 Gram(s) IV Push once  diphenoxylate/atropine 1 Tablet(s) Oral four times a day  glucagon  Injectable 1 milliGRAM(s) IntraMuscular once  insulin lispro (ADMELOG) corrective regimen sliding scale   SubCutaneous every 6 hours  lactobacillus acidophilus 1 Tablet(s) Oral two times a day  metoprolol tartrate 50 milliGRAM(s) Oral two times a day  multivitamin 1 Tablet(s) Oral daily  nystatin Powder 1 Application(s) Topical every 8 hours  pantoprazole  Injectable 40 milliGRAM(s) IV Push daily  rivaroxaban 20 milliGRAM(s) Oral with dinner  zinc sulfate 220 milliGRAM(s) Oral daily    MEDICATIONS  (PRN):  acetaminophen    Suspension .. 650 milliGRAM(s) Oral every 6 hours PRN Temp greater or equal to 38C (100.4F), Mild Pain (1 - 3), Moderate Pain (4 - 6)  artificial tears (preservative free) Ophthalmic Solution 1 Drop(s) Both EYES every 8 hours PRN Dry Eyes  guaiFENesin   Syrup  (Sugar-Free) 100 milliGRAM(s) Oral every 6 hours PRN Cough  oxyCODONE    Solution 5 milliGRAM(s) Oral every 4 hours PRN Severe Pain (7 - 10)      LABS:        MICROBIOLOGY:     RADIOLOGY:  [ ] Reviewed and interpreted by me    PULMONARY FUNCTION TESTS:    EKG: Interval Events: no events overnight     CONSTITUTIONAL: denies fever, chills, pain, or night sweats.   EYES: denies eye pain or blurry vision.  ENT: denies ear pain, nose bleed or sore throat.  NECK: denies neck pain or stiffness.  RESPIRATORY: +productive cough. denies SOB, wheezing.   CV: denies chest pain or palpitation.  GI: +diarrhea. denies abdominal pain, N/V, constipation, melena or hematochezia.  : denies dysuria, hematuria or frequency.   NEUROLOGICAL: denies headache, weakness, numbness or tingling sensation.  SKIN: denies itching, burning or rashes.   MUSCULOSKELETAL: denies joint pain, swelling or muscle pain.   HEME: denies easy bruising, or bleeding gums     OBJECTIVE:  ICU Vital Signs Last 24 Hrs  T(C): 36.1 (27 Feb 2021 05:59), Max: 37.1 (26 Feb 2021 23:06)  T(F): 96.9 (27 Feb 2021 05:59), Max: 98.7 (26 Feb 2021 23:06)  HR: 96 (27 Feb 2021 05:59) (84 - 105)  BP: 134/58 (27 Feb 2021 05:59) (122/60 - 139/62)  BP(mean): 3 (27 Feb 2021 05:59) (3 - 3)  RR: 22 (27 Feb 2021 05:59) (19 - 22)  SpO2: 98% (27 Feb 2021 05:59) (97% - 100%)    Mode: AC/ CMV (Assist Control/ Continuous Mandatory Ventilation), RR (machine): 12, TV (machine): 450, FiO2: 25, PEEP: 5, MAP: 13, PIP: 24    02-26 @ 07:01  -  02-27 @ 07:00  --------------------------------------------------------  IN: 1340 mL / OUT: 650 mL / NET: 690 mL      CAPILLARY BLOOD GLUCOSE      POCT Blood Glucose.: 142 mg/dL (27 Feb 2021 05:00)    PHYSICAL EXAM:  GENERAL: NAD, well-developed, obese.  HEAD:  Atraumatic, Normocephalic  EYES: EOMI, PERRLA, conjunctiva and sclera clear  NECK: Supple, No JVD, tracheostomy site C/D/I.   CHEST/LUNG: Mildly coarse to upper bases on auscultation bilaterally; No wheezing present.  HEART: Regular rate and rhythm; No murmurs, rubs, or gallops  ABDOMEN: Soft, Nontender, Nondistended; Bowel sounds present, + PEG, + Rectal tube.   EXTREMITIES:  2+ Peripheral Pulses, +2 edema on b/l feet. No clubbing, cyanosis.  NEUROLOGY: A&0x3, non-focal, no neurological deficits   SKIN: No rashes or lesions      HOSPITAL MEDICATIONS:  MEDICATIONS  (STANDING):  ALBUTerol    90 MICROgram(s) HFA Inhaler 2 Puff(s) Inhalation every 6 hours  amLODIPine   Tablet 5 milliGRAM(s) Oral daily  chlorhexidine 0.12% Liquid 15 milliLiter(s) Oral Mucosa every 12 hours  chlorhexidine 4% Liquid 1 Application(s) Topical daily  cholestyramine Powder (Sugar-Free) 4 Gram(s) Oral two times a day  clonazePAM  Tablet 0.5 milliGRAM(s) Oral daily  collagenase Ointment 1 Application(s) Topical two times a day  dextrose 40% Gel 15 Gram(s) Oral once  dextrose 5%. 1000 milliLiter(s) (50 mL/Hr) IV Continuous <Continuous>  dextrose 5%. 1000 milliLiter(s) (100 mL/Hr) IV Continuous <Continuous>  dextrose 50% Injectable 25 Gram(s) IV Push once  dextrose 50% Injectable 12.5 Gram(s) IV Push once  dextrose 50% Injectable 25 Gram(s) IV Push once  diphenoxylate/atropine 1 Tablet(s) Oral four times a day  glucagon  Injectable 1 milliGRAM(s) IntraMuscular once  insulin lispro (ADMELOG) corrective regimen sliding scale   SubCutaneous every 6 hours  lactobacillus acidophilus 1 Tablet(s) Oral two times a day  metoprolol tartrate 50 milliGRAM(s) Oral two times a day  multivitamin 1 Tablet(s) Oral daily  nystatin Powder 1 Application(s) Topical every 8 hours  pantoprazole  Injectable 40 milliGRAM(s) IV Push daily  rivaroxaban 20 milliGRAM(s) Oral with dinner  zinc sulfate 220 milliGRAM(s) Oral daily    MEDICATIONS  (PRN):  acetaminophen    Suspension .. 650 milliGRAM(s) Oral every 6 hours PRN Temp greater or equal to 38C (100.4F), Mild Pain (1 - 3), Moderate Pain (4 - 6)  artificial tears (preservative free) Ophthalmic Solution 1 Drop(s) Both EYES every 8 hours PRN Dry Eyes  guaiFENesin   Syrup  (Sugar-Free) 100 milliGRAM(s) Oral every 6 hours PRN Cough  oxyCODONE    Solution 5 milliGRAM(s) Oral every 4 hours PRN Severe Pain (7 - 10)

## 2021-02-28 LAB
CULTURE RESULTS: SIGNIFICANT CHANGE UP
GLUCOSE BLDC GLUCOMTR-MCNC: 129 MG/DL — HIGH (ref 70–99)
GLUCOSE BLDC GLUCOMTR-MCNC: 131 MG/DL — HIGH (ref 70–99)
GLUCOSE BLDC GLUCOMTR-MCNC: 140 MG/DL — HIGH (ref 70–99)
SPECIMEN SOURCE: SIGNIFICANT CHANGE UP

## 2021-02-28 PROCEDURE — 99233 SBSQ HOSP IP/OBS HIGH 50: CPT

## 2021-02-28 RX ORDER — SODIUM CHLORIDE 9 MG/ML
1000 INJECTION, SOLUTION INTRAVENOUS
Refills: 0 | Status: DISCONTINUED | OUTPATIENT
Start: 2021-02-28 | End: 2021-03-19

## 2021-02-28 RX ORDER — PSYLLIUM SEED (WITH DEXTROSE)
1 POWDER (GRAM) ORAL
Refills: 0 | Status: DISCONTINUED | OUTPATIENT
Start: 2021-02-28 | End: 2021-03-05

## 2021-02-28 RX ADMIN — ALBUTEROL 2 PUFF(S): 90 AEROSOL, METERED ORAL at 21:28

## 2021-02-28 RX ADMIN — Medication 1 TABLET(S): at 12:11

## 2021-02-28 RX ADMIN — Medication 1 APPLICATION(S): at 05:16

## 2021-02-28 RX ADMIN — ALBUTEROL 2 PUFF(S): 90 AEROSOL, METERED ORAL at 14:54

## 2021-02-28 RX ADMIN — NYSTATIN CREAM 1 APPLICATION(S): 100000 CREAM TOPICAL at 21:08

## 2021-02-28 RX ADMIN — PANTOPRAZOLE SODIUM 40 MILLIGRAM(S): 20 TABLET, DELAYED RELEASE ORAL at 14:46

## 2021-02-28 RX ADMIN — CHLORHEXIDINE GLUCONATE 15 MILLILITER(S): 213 SOLUTION TOPICAL at 05:16

## 2021-02-28 RX ADMIN — NYSTATIN CREAM 1 APPLICATION(S): 100000 CREAM TOPICAL at 05:16

## 2021-02-28 RX ADMIN — ALBUTEROL 2 PUFF(S): 90 AEROSOL, METERED ORAL at 07:43

## 2021-02-28 RX ADMIN — Medication 1 APPLICATION(S): at 15:06

## 2021-02-28 RX ADMIN — CHLORHEXIDINE GLUCONATE 15 MILLILITER(S): 213 SOLUTION TOPICAL at 07:35

## 2021-02-28 RX ADMIN — NYSTATIN CREAM 1 APPLICATION(S): 100000 CREAM TOPICAL at 12:13

## 2021-02-28 RX ADMIN — CHLORHEXIDINE GLUCONATE 1 APPLICATION(S): 213 SOLUTION TOPICAL at 07:35

## 2021-02-28 RX ADMIN — ALBUTEROL 2 PUFF(S): 90 AEROSOL, METERED ORAL at 03:39

## 2021-02-28 NOTE — PROGRESS NOTE ADULT - ASSESSMENT
60 year old female with prolonged hospital course secondary to COVID 19, GI consulted for persistent diarrhea     Problem/Recommendation - 1:  Problem: COVID-19. Recommendation: status post treatment by infectious disease  now off of isolation.     Problem/Recommendation - 2:  ·  Problem: Respiratory failure.  Recommendation: status post tracheostomy, now downsized, on trach collar.      Problem/Recommendation - 3:  ·  Problem: Diarrhea.  Recommendation: seems somewhat improved on current regimen, stool output 150cc overnight. Added metamucil to bulk up stool. Viokase given as well.     Problem/Recommendation - 4:  ·  Problem: Hernia, incisional.  Recommendation: reducible, nontender. No signs of incarceration. Randi has hx of bowel resection, but she cannot provide the exact circumstances.  -serial abdominal exams.      Problem/Recommendation - 5:  ·  Problem: Gastrostomy present.  Recommendation: functioning well.      Problem/Recommendation - 6:  Problem: ARTEMIO (acute kidney injury). Recommendation: s/p CVVHD, now improved.     Problem/Recommendation - 7:  Problem: History of pressure ulcer. Recommendation: per RCU team.     Problem/Recommendation - 8:  Problem: Clostridium difficile infection. Recommendation: status post 2 weeks of PO vanco  -vanco per ID on hold.    Attending Attestation:   Differential diagnosis and plan of care discussed with patient after the evaluation  75 Minutes spent on total encounter of which more than fifty percent of the encounter was spent counseling and/or coordinating care by the attending physician.      Ranjeet Ponce M.D.   Gastroenterology and Hepatology  Cell: [phone].

## 2021-02-28 NOTE — PROGRESS NOTE ADULT - ATTENDING COMMENTS
60F with PMHx as above presenting to [facility] for hypoxemic respiratory failure with ARDS 2/2 COVID19 PNA further c/b superimposed MSSA cavitary PNA and ARF requiring HD. Pt with failure to wean from mechanical ventilation, s/p tracheostomy placement 12/18 and transfer to RCU 12/28.     1. Neuro - encephalopathy resolved   OOB to chair as tolerated. Moves all 4 extremities independently, but remains physically severely deconditioned and dependent of ADLs likely 2/2 critical illness polyneuropathy.     2. Pulm - trach placement (CTSx, 12/18 Proximal 7XLT and then changed to 8Fr Bivona 12/31 for persistent air leak and downsized to #6 cuffed Portex 2/25), now with resolution of air leak and significant improvement in ventilator synchrony. Daily SBTs as tolerated. Pt tolerating trach collar QD, still requiring PSV qhs (pt with home NIV Bilevel 16/9 for chronic hypercapnia 2/2 OHS, but does not have AARON by PSG). Pt able to tolerate PMV most of the day. Will c/w airway clearance therapy and trach care as per RCU team. Will continue to wean as tolerated.     3. ID - COVID19 PNA but with hospital course c/b MSSA bacteremia with cavitary PNA s/p completion of Abx with Primaxin on 12/28 followed by course of Ceftazidime through 1/5 for Stenotrophomonas PNA followed by VAP 2/2 Stenotrophomonas and Pseudomonas 1/12 s/p completion of Flagyl+Ceftazidime+Vancomycin with short-term clinical improvement followed by recurrent of VAP 2/2 CRE Pseudomonas/Stenotrophomonas, s/p eradication therapy with IV+Nebulized Abx 1/28-2/6. Repeat CT Chest performed 12/4 showed significantly improved cavitary lesions from prior MSSA PNA without area requiring further source control. Hospital course then c/b CDiff colitis 2/1, now on PO Vancomycin with improvement in diarrhea but persistent loose stools. GI and ID recs appreciated, persistent diarrhea unlikely 2/2 CDiff at this time. Stool cx PCR NTD, loperamide and cholestyramine started 2/25. Will monitor stool output.    4. GI - oropharyngeal dysphagia, IR guided PEG placed 1/11- PEG clogged this morning, have asked GI to eval    5. Renal - ARF on CRRT, following by intermittent HD (last session 12/17) with renal recovery.     6. Endo - NPH+HISS for glucose control.     7. Derm - Wound consult and plastic sx recs appreciated.    Dispo pending medical optimization, likely ROJELIO.     I was physically present for the key portions of the evaluation and management (E/M) service provided.  I agree with the above history, physical, and plan which I have reviewed and edited where appropriate.     35 minutes spent on total encounter; more than 50% of the visit was spent counseling and/or coordinating care by the attending physician.     Plan discussed with RCU Team.

## 2021-02-28 NOTE — PROGRESS NOTE ADULT - ASSESSMENT
61 YO F with PMHx of HTN, PE/DVT on Xarelto, Peritonitis s/p Oral's Procedure and Ileostomy (reversed in 2011) and OHS on BIPAP 16/9 [facility] outpatient who presented to [facility] on 11/18 for AHRF second to COVID 19, intubated on 11/23 complicated by R lung abscesses on CT CHEST 12/5, multibacterial PNA and bacteremia, ARTEMIO s/p CRRT and HD, and s/p Tracheostomy 12/18 c/b track leak and s/p OR 8Bivona. Transferred to [facility] for furhter management.     # AMS/ Anxiety   - AOX3 at baseline.   - CTH and EEG WNL   - Anxiety noted and Klonopin started.   - Supportive care.     # ARDS second to COVID vs Multi-bacterial PNA/ R Lung Abscess  - Intubated 11/23 and c/b multi-bacterial PNA and lung abscesses.   - s/p Tracheostomy on 12/18 by CTSx. Sutures out 1/1  - Air leak noted and s/p Bivona with CTSx 12/31. Now downsized to 6DCT 2/25  - Tolerating TC with PMV QD and PS 16/9 QHS .   - Proventil and Chest PT Q6H and Suction PRN. Trach Care QD.     # Vasoplegic vs Septic Shock   - Continue on Norvasc and Metoprolol.   - On full AC for Hx of DVT. CTA CHEST with no PEs.     # ARTEMIO s/p CRRT and HD   - ARTEMIO now resolved and no longer required HD.   - Hypokalemia in setting of diarrhea/ CDIFF. Monitor electrolytes.   - Monitor renal function and avoid nephrotoxins.     # GI   - s/p PEG placement with IR 1/11   - Continue on TF and monitor tolerance.     # Sepsis second to COVID vs Multi-bacterial PNA/ Bacteremia and R Lung Abscess vs CDIFF (+)   - COVID with superimposed multi-bacterial VAP vs aspiration PNA vs cavitary PNA.   - SCx (11/24) MSSA and BCx (11/27) with MSSA and Proteus Bacteremia  - SCx (12/1, 12/12 and 12/27) with Stenotrophomonas.   - SCx (1/9) with  Pseudomonas and Stenotrophomonas   - BCX has been persistently negative from 12/1, and most recently 1/12   - CT CHEST 12/4 with right lung cavitation.   - s/p multiple antibiotics, but now completed Fortaz and Flagyl (1/12-1/16)  - RPT CT CHEST 1/28 with improvement of right lung abscess   - SCx with  Pseudomonas and Stenotrophomonas and s/p TED/ Fortaz (1/28-2/6)    # CDIFF (+)   - Started on PO Vancomycin 2/1-2/25 with no improvement in diarrhea.   - Cholestyramine attempted, but failed.   - Stool O&P and GI PCR NGTD. Check Stool Cx. c/w Lomotil QID trial for now.   - ID following     # DM2   - Continue on ISS and monitor FS. Adjust insulin as needed.     # Hx of DVT   - Continue on Xarelto.     # Skin/ Facial Wounds   - WOC recommendations appreciated   - Plastics evaluation appreciated, pending re-evaluation     # Contracted Wrists   - PT/OT working on strength.     # DVT/ GI PPX with FULL AC/ PPI   # CODE STATUS - FULL CODE   # DISPO - PT recc Rehab/ Vent Facility    59 YO F with PMHx of HTN, PE/DVT on Xarelto, Peritonitis s/p Oral's Procedure and Ileostomy (reversed in 2011) and OHS on BIPAP 16/9 [facility] outpatient who presented to [facility] on 11/18 for AHRF second to COVID 19, intubated on 11/23 complicated by R lung abscesses on CT CHEST 12/5, multibacterial PNA and bacteremia, ARTEMIO s/p CRRT and HD, and s/p Tracheostomy 12/18 c/b track leak and s/p OR 8Bivona. Transferred to [facility] for furhter management.     # AMS/ Anxiety   - AOX3 at baseline.   - CTH and EEG WNL   - Anxiety noted and Klonopin started.   - Supportive care.     # ARDS second to COVID vs Multi-bacterial PNA/ R Lung Abscess  - s/p Intubated on 11/23 and c/b multi-bacterial PNA and lung abscesses.   - s/p Tracheostomy on 12/18 by CTSx. Sutures out 1/1  - s/p Air leak noted and s/p Bivona with CTSx 12/31. Now downsized to 6DCT 2/25  - Tolerating TC with PMV QD and PS 16/9 QHS .   - Proventil and Chest PT Q6H and Suction PRN. Trach Care QD.     # Vasoplegic vs Septic Shock   - c/w Norvasc and Metoprolol.   - On full AC for Hx of DVT. CTA CHEST with no PEs.     # ARTEMIO s/p CRRT and HD   - ARTEMIO now resolved and no longer required HD.   - Hypokalemia in setting of diarrhea/ CDIFF. Monitor electrolytes.   - Monitor renal function and avoid nephrotoxins.     # GI   - s/p PEG placement with IR 1/11 - Currently Clogged  - s/p Trial of Viokase, GI will attempt tomorrow morning  - TF on hold for now.    - Speech and Swallow evaluation pending    # Sepsis second to COVID vs Multi-bacterial PNA/ Bacteremia and R Lung Abscess vs CDIFF (+)   - COVID with superimposed multi-bacterial VAP vs aspiration PNA vs cavitary PNA.   - SCx (11/24) MSSA and BCx (11/27) with MSSA and Proteus Bacteremia  - SCx (12/1, 12/12 and 12/27) with Stenotrophomonas.   - SCx (1/9) with  Pseudomonas and Stenotrophomonas   - Bcx has been persistently negative from 12/1, and most recently 1/12   - CT CHEST 12/4 with right lung cavitation.   - s/p multiple antibiotics, but now completed Fortaz and Flagyl (1/12-1/16)  - RPT CT CHEST 1/28 with improvement of right lung abscess   - SCx with  Pseudomonas and Stenotrophomonas and s/p TED/ Fortaz (1/28-2/6)    # CDIFF (+)   - s/p PO Vancomycin 2/1-2/25 with no improvement in diarrhea.   - Cholestyramine attempted, but failed.   - Stool O&P and GI PCR NGTD. Check Stool Cx. c/w Lomotil QID trial for now.   - ID following     # DMII   - Continue on ISS and monitor FS. Adjust insulin as needed.     # Hx of DVT   - c/w Xarelto.     # Skin/ Facial Wounds   - WOC recommendations appreciated   - Plastics evaluation appreciated, pending re-evaluation     # Contracted Wrists   - PT/OT working on strength.     # DVT/ GI PPX with FULL AC/ PPI   # CODE STATUS - FULL CODE   # DISPO - PT recc Rehab/ Vent Facility

## 2021-02-28 NOTE — PROGRESS NOTE ADULT - SUBJECTIVE AND OBJECTIVE BOX
Chief Complaint:  Patient is a 60y old  Female who presents with a chief complaint of Transfer from [facility] (2021 08:34)      Interval Events:   150 cc    Allergies:  Kiwi (Unknown)  latex (Anaphylaxis)  latex (Unknown)  penicillin (Other)  penicillin (Unknown)  perfume  hives (Other)  potassium acetate (Other)  soap additives hives (Other)      Hospital Medications:  acetaminophen    Suspension .. 650 milliGRAM(s) Oral every 6 hours PRN  ALBUTerol    90 MICROgram(s) HFA Inhaler 2 Puff(s) Inhalation every 6 hours  amLODIPine   Tablet 5 milliGRAM(s) Oral daily  artificial tears (preservative free) Ophthalmic Solution 1 Drop(s) Both EYES every 8 hours PRN  chlorhexidine 0.12% Liquid 15 milliLiter(s) Oral Mucosa every 12 hours  chlorhexidine 4% Liquid 1 Application(s) Topical daily  cholestyramine Powder (Sugar-Free) 4 Gram(s) Oral two times a day  clonazePAM  Tablet 0.5 milliGRAM(s) Oral daily  collagenase Ointment 1 Application(s) Topical two times a day  dextrose 40% Gel 15 Gram(s) Oral once  dextrose 5%. 1000 milliLiter(s) IV Continuous <Continuous>  dextrose 5%. 1000 milliLiter(s) IV Continuous <Continuous>  dextrose 5%. 1000 milliLiter(s) IV Continuous <Continuous>  dextrose 50% Injectable 25 Gram(s) IV Push once  dextrose 50% Injectable 12.5 Gram(s) IV Push once  dextrose 50% Injectable 25 Gram(s) IV Push once  diphenoxylate/atropine 1 Tablet(s) Oral four times a day  glucagon  Injectable 1 milliGRAM(s) IntraMuscular once  guaiFENesin   Syrup  (Sugar-Free) 100 milliGRAM(s) Oral every 6 hours PRN  insulin lispro (ADMELOG) corrective regimen sliding scale   SubCutaneous every 6 hours  lactobacillus acidophilus 1 Tablet(s) Oral two times a day  metoprolol tartrate 50 milliGRAM(s) Oral two times a day  multivitamin 1 Tablet(s) Oral daily  nystatin Powder 1 Application(s) Topical every 8 hours  oxyCODONE    Solution 5 milliGRAM(s) Oral every 4 hours PRN  pantoprazole  Injectable 40 milliGRAM(s) IV Push daily  rivaroxaban 20 milliGRAM(s) Oral with dinner  sodium bicarbonate 650 milliGRAM(s) Oral once  Viokase 10,440 units 1 Tablet(s) 1 Tablet(s) Enteral Tube once  zinc sulfate 220 milliGRAM(s) Oral daily      PMHX/PSHX:  Pneumomediastinum    Umbilical hernia    Osteoarthritis of both knees, unspecified osteoarthritis type    AARON (Obstructive Sleep Apnea)    Pneumonia    S/P Colon Resection    pt with allergies to perfumes and addidtive to soaps, requires hypoallergenic sheets and gowns, line    Cervical Dysplasia    Latex Sensitivity    GERD (Gastroesophageal Reflux Disease)    Asthma    Anemia    S/P Joint Replacement    DVT (Deep Venous Thrombosis)    HTN (Hypertension)    H/O ileostomy    S/P LEEP    S/P Exploratory Laparotomy    S/P Colonoscopy    S/P Endoscopy    S/P  Section    History of Tubal Ligation    S/P Knee Surgery    Umbilical Hernia    Osteoarthritis of Knee    HTN (Hypertension)        Family history:  Family history of gastric cancer    Family history of breast cancer (Grandparent)        ROS:     General:  No wt loss, fevers, chills, night sweats, fatigue,   Eyes:  Good vision, no reported pain  ENT:  No sore throat, pain, runny nose, dysphagia  CV:  No pain, palpitations, hypo/hypertension  Resp:  No dyspnea, cough, tachypnea, wheezing  GI:  See HPI  :  No pain, bleeding, incontinence, nocturia  Muscle:  No pain, weakness  Neuro:  No weakness, tingling, memory problems  Psych:  No fatigue, insomnia, mood problems, depression  Endocrine:  No polyuria, polydipsia, cold/heat intolerance  Heme:  No petechiae, ecchymosis, easy bruisability  Skin:  No rash, edema      PHYSICAL EXAM:     GENERAL:  Appears stated age, well-groomed, well-nourished, no distress  HEENT:  NC/AT,  conjunctivae clear, sclera-anicteric  NECK: Trachea midline, supple  CHEST:  Full & symmetric excursion, no increased effort, breath sounds clear  HEART:  Regular rhythm, no lola/heave  ABDOMEN:  Soft, non-tender, non-distended, normoactive bowel sounds,  no masses ,no hepato-splenomegaly,   EXTREMITIES:  no cyanosis,clubbing or edema  SKIN:  No rash/erythema/petechiae, no jaundice  NEURO:  Alert, oriented, no asterixis  RECTAL: Deferred    Vital Signs:  Vital Signs Last 24 Hrs  T(C): 37.4 (2021 08:00), Max: 37.4 (2021 08:00)  T(F): 99.3 (2021 08:00), Max: 99.3 (2021 08:00)  HR: 105 (2021 10:48) (94 - 112)  BP: 136/70 (2021 08:00) (129/60 - 147/64)  BP(mean): --  RR: 22 (2021 08:00) (19 - 24)  SpO2: 96% (2021 10:48) (95% - 100%)  Daily     Daily     LABS:                    Imaging:

## 2021-02-28 NOTE — PROGRESS NOTE ADULT - SUBJECTIVE AND OBJECTIVE BOX
CHIEF COMPLAINT: Patient is a 60y old  Female who presents with a chief complaint of Transfer from [facility] (27 Feb 2021 14:06)    Interval Events: PEG tube clogged.    REVIEW OF SYSTEMS:  Constitutional:   Eyes:  ENT:  CV:  Resp:  GI:  :  MSK:  Integumentary:  Neurological:  Psychiatric:  Endocrine:  Hematologic/Lymphatic:  Allergic/Immunologic:  [ ] All other systems negative  [ ] Unable to assess ROS because ________    OBJECTIVE:  ICU Vital Signs Last 24 Hrs  T(C): 37.4 (28 Feb 2021 08:00), Max: 37.4 (28 Feb 2021 08:00)  T(F): 99.3 (28 Feb 2021 08:00), Max: 99.3 (28 Feb 2021 08:00)  HR: 100 (28 Feb 2021 07:32) (94 - 112)  BP: 136/70 (28 Feb 2021 08:00) (126/51 - 147/64)  BP(mean): --  ABP: --  ABP(mean): --  RR: 22 (28 Feb 2021 08:00) (18 - 24)  SpO2: 100% (28 Feb 2021 08:00) (95% - 100%)    Mode: AC/ CMV (Assist Control/ Continuous Mandatory Ventilation), RR (machine): 12, TV (machine): 450, FiO2: 25, PEEP: 5, MAP: 12, PIP: 22    02-27 @ 07:01  -  02-28 @ 07:00  --------------------------------------------------------  IN: 1340 mL / OUT: 1675 mL / NET: -335 mL      CAPILLARY BLOOD GLUCOSE      POCT Blood Glucose.: 129 mg/dL (28 Feb 2021 05:23)      PHYSICAL EXAM:  General:   HEENT:   Lymph Nodes:  Neck:   Respiratory:   Cardiovascular:   Abdomen:   Extremities:   Skin:   Neurological:  Psychiatry:    HOSPITAL MEDICATIONS:  MEDICATIONS  (STANDING):  ALBUTerol    90 MICROgram(s) HFA Inhaler 2 Puff(s) Inhalation every 6 hours  amLODIPine   Tablet 5 milliGRAM(s) Oral daily  chlorhexidine 0.12% Liquid 15 milliLiter(s) Oral Mucosa every 12 hours  chlorhexidine 4% Liquid 1 Application(s) Topical daily  cholestyramine Powder (Sugar-Free) 4 Gram(s) Oral two times a day  clonazePAM  Tablet 0.5 milliGRAM(s) Oral daily  collagenase Ointment 1 Application(s) Topical two times a day  dextrose 40% Gel 15 Gram(s) Oral once  dextrose 5%. 1000 milliLiter(s) (50 mL/Hr) IV Continuous <Continuous>  dextrose 5%. 1000 milliLiter(s) (100 mL/Hr) IV Continuous <Continuous>  dextrose 50% Injectable 25 Gram(s) IV Push once  dextrose 50% Injectable 12.5 Gram(s) IV Push once  dextrose 50% Injectable 25 Gram(s) IV Push once  diphenoxylate/atropine 1 Tablet(s) Oral four times a day  glucagon  Injectable 1 milliGRAM(s) IntraMuscular once  insulin lispro (ADMELOG) corrective regimen sliding scale   SubCutaneous every 6 hours  lactobacillus acidophilus 1 Tablet(s) Oral two times a day  metoprolol tartrate 50 milliGRAM(s) Oral two times a day  multivitamin 1 Tablet(s) Oral daily  nystatin Powder 1 Application(s) Topical every 8 hours  pantoprazole  Injectable 40 milliGRAM(s) IV Push daily  rivaroxaban 20 milliGRAM(s) Oral with dinner  sodium bicarbonate 650 milliGRAM(s) Oral once  Viokase 10,440 units 1 Tablet(s) 1 Tablet(s) Enteral Tube once  zinc sulfate 220 milliGRAM(s) Oral daily    MEDICATIONS  (PRN):  acetaminophen    Suspension .. 650 milliGRAM(s) Oral every 6 hours PRN Temp greater or equal to 38C (100.4F), Mild Pain (1 - 3), Moderate Pain (4 - 6)  artificial tears (preservative free) Ophthalmic Solution 1 Drop(s) Both EYES every 8 hours PRN Dry Eyes  guaiFENesin   Syrup  (Sugar-Free) 100 milliGRAM(s) Oral every 6 hours PRN Cough  oxyCODONE    Solution 5 milliGRAM(s) Oral every 4 hours PRN Severe Pain (7 - 10)      LABS:                    MICROBIOLOGY:     RADIOLOGY:  [ ] Reviewed and interpreted by me    PULMONARY FUNCTION TESTS:    EKG: CHIEF COMPLAINT: Patient is a 60y old  Female who presents with a chief complaint of Transfer from [facility] (27 Feb 2021 14:06)    Interval Events: PEG tube clogged. GI will attempt to unclog in the morning. Pt reports feeling well. No new complaints. VSS, and medications reviewed. Speech and swallow pending.     REVIEW OF SYSTEMS:  see above  [x] All other systems negative      OBJECTIVE:  ICU Vital Signs Last 24 Hrs  T(C): 37.4 (28 Feb 2021 08:00), Max: 37.4 (28 Feb 2021 08:00)  T(F): 99.3 (28 Feb 2021 08:00), Max: 99.3 (28 Feb 2021 08:00)  HR: 100 (28 Feb 2021 07:32) (94 - 112)  BP: 136/70 (28 Feb 2021 08:00) (126/51 - 147/64)  BP(mean): --  ABP: --  ABP(mean): --  RR: 22 (28 Feb 2021 08:00) (18 - 24)  SpO2: 100% (28 Feb 2021 08:00) (95% - 100%)    Mode: AC/ CMV (Assist Control/ Continuous Mandatory Ventilation), RR (machine): 12, TV (machine): 450, FiO2: 25, PEEP: 5, MAP: 12, PIP: 22    02-27 @ 07:01  -  02-28 @ 07:00  --------------------------------------------------------  IN: 1340 mL / OUT: 1675 mL / NET: -335 mL      CAPILLARY BLOOD GLUCOSE      POCT Blood Glucose.: 129 mg/dL (28 Feb 2021 05:23)    PHYSICAL EXAM  GENERAL: Morbidly obese, NAD  NECK: Supple, No JVD, Trach site c/d/i.   LUNG: + Mildly coarse to b/l Upper bases, Normal respiratory effort.  HEART: +s1, s2  ABDOMEN: +BS, soft, nt/nd, obese abdomen, no peritoneals signs noted, +PEG, site c/d/i.   EXTREMITIES: +2 pitting edema b/l lower ext.  NEUROLOGY: A&0x3, non-focal, no neuro deficits   SKIN: as per AAI sheet RN monitoring      HOSPITAL MEDICATIONS:  MEDICATIONS  (STANDING):  ALBUTerol    90 MICROgram(s) HFA Inhaler 2 Puff(s) Inhalation every 6 hours  amLODIPine   Tablet 5 milliGRAM(s) Oral daily  chlorhexidine 0.12% Liquid 15 milliLiter(s) Oral Mucosa every 12 hours  chlorhexidine 4% Liquid 1 Application(s) Topical daily  cholestyramine Powder (Sugar-Free) 4 Gram(s) Oral two times a day  clonazePAM  Tablet 0.5 milliGRAM(s) Oral daily  collagenase Ointment 1 Application(s) Topical two times a day  dextrose 40% Gel 15 Gram(s) Oral once  dextrose 5%. 1000 milliLiter(s) (50 mL/Hr) IV Continuous <Continuous>  dextrose 5%. 1000 milliLiter(s) (100 mL/Hr) IV Continuous <Continuous>  dextrose 50% Injectable 25 Gram(s) IV Push once  dextrose 50% Injectable 12.5 Gram(s) IV Push once  dextrose 50% Injectable 25 Gram(s) IV Push once  diphenoxylate/atropine 1 Tablet(s) Oral four times a day  glucagon  Injectable 1 milliGRAM(s) IntraMuscular once  insulin lispro (ADMELOG) corrective regimen sliding scale   SubCutaneous every 6 hours  lactobacillus acidophilus 1 Tablet(s) Oral two times a day  metoprolol tartrate 50 milliGRAM(s) Oral two times a day  multivitamin 1 Tablet(s) Oral daily  nystatin Powder 1 Application(s) Topical every 8 hours  pantoprazole  Injectable 40 milliGRAM(s) IV Push daily  rivaroxaban 20 milliGRAM(s) Oral with dinner  sodium bicarbonate 650 milliGRAM(s) Oral once  Viokase 10,440 units 1 Tablet(s) 1 Tablet(s) Enteral Tube once  zinc sulfate 220 milliGRAM(s) Oral daily    MEDICATIONS  (PRN):  acetaminophen    Suspension .. 650 milliGRAM(s) Oral every 6 hours PRN Temp greater or equal to 38C (100.4F), Mild Pain (1 - 3), Moderate Pain (4 - 6)  artificial tears (preservative free) Ophthalmic Solution 1 Drop(s) Both EYES every 8 hours PRN Dry Eyes  guaiFENesin   Syrup  (Sugar-Free) 100 milliGRAM(s) Oral every 6 hours PRN Cough  oxyCODONE    Solution 5 milliGRAM(s) Oral every 4 hours PRN Severe Pain (7 - 10)      LABS:      MICROBIOLOGY:     RADIOLOGY:  [ ] Reviewed and interpreted by me    PULMONARY FUNCTION TESTS:    EKG:

## 2021-03-01 LAB
ANION GAP SERPL CALC-SCNC: 13 MMOL/L — SIGNIFICANT CHANGE UP (ref 7–14)
APPEARANCE UR: ABNORMAL
BACTERIA # UR AUTO: ABNORMAL
BILIRUB UR-MCNC: NEGATIVE — SIGNIFICANT CHANGE UP
BUN SERPL-MCNC: 7 MG/DL — SIGNIFICANT CHANGE UP (ref 7–23)
CALCIUM SERPL-MCNC: 9.9 MG/DL — SIGNIFICANT CHANGE UP (ref 8.4–10.5)
CHLORIDE SERPL-SCNC: 97 MMOL/L — LOW (ref 98–107)
CO2 SERPL-SCNC: 24 MMOL/L — SIGNIFICANT CHANGE UP (ref 22–31)
COLOR SPEC: YELLOW — SIGNIFICANT CHANGE UP
CREAT SERPL-MCNC: 0.34 MG/DL — LOW (ref 0.5–1.3)
DIFF PNL FLD: NEGATIVE — SIGNIFICANT CHANGE UP
EPI CELLS # UR: 2 /HPF — SIGNIFICANT CHANGE UP (ref 0–5)
GLUCOSE BLDC GLUCOMTR-MCNC: 125 MG/DL — HIGH (ref 70–99)
GLUCOSE BLDC GLUCOMTR-MCNC: 128 MG/DL — HIGH (ref 70–99)
GLUCOSE BLDC GLUCOMTR-MCNC: 150 MG/DL — HIGH (ref 70–99)
GLUCOSE BLDC GLUCOMTR-MCNC: 151 MG/DL — HIGH (ref 70–99)
GLUCOSE SERPL-MCNC: 133 MG/DL — HIGH (ref 70–99)
GLUCOSE UR QL: NEGATIVE — SIGNIFICANT CHANGE UP
HCT VFR BLD CALC: 37.4 % — SIGNIFICANT CHANGE UP (ref 34.5–45)
HGB BLD-MCNC: 11.4 G/DL — LOW (ref 11.5–15.5)
KETONES UR-MCNC: NEGATIVE — SIGNIFICANT CHANGE UP
LEUKOCYTE ESTERASE UR-ACNC: NEGATIVE — SIGNIFICANT CHANGE UP
MAGNESIUM SERPL-MCNC: 1.7 MG/DL — SIGNIFICANT CHANGE UP (ref 1.6–2.6)
MCHC RBC-ENTMCNC: 27.4 PG — SIGNIFICANT CHANGE UP (ref 27–34)
MCHC RBC-ENTMCNC: 30.5 GM/DL — LOW (ref 32–36)
MCV RBC AUTO: 89.9 FL — SIGNIFICANT CHANGE UP (ref 80–100)
NITRITE UR-MCNC: NEGATIVE — SIGNIFICANT CHANGE UP
NRBC # BLD: 0 /100 WBCS — SIGNIFICANT CHANGE UP
NRBC # FLD: 0 K/UL — SIGNIFICANT CHANGE UP
PH UR: 6 — SIGNIFICANT CHANGE UP (ref 5–8)
PHOSPHATE SERPL-MCNC: 4.9 MG/DL — HIGH (ref 2.5–4.5)
PLATELET # BLD AUTO: 421 K/UL — HIGH (ref 150–400)
POTASSIUM SERPL-MCNC: 3.5 MMOL/L — SIGNIFICANT CHANGE UP (ref 3.5–5.3)
POTASSIUM SERPL-SCNC: 3.5 MMOL/L — SIGNIFICANT CHANGE UP (ref 3.5–5.3)
PROT UR-MCNC: ABNORMAL
RBC # BLD: 4.16 M/UL — SIGNIFICANT CHANGE UP (ref 3.8–5.2)
RBC # FLD: 16.2 % — HIGH (ref 10.3–14.5)
RBC CASTS # UR COMP ASSIST: 1 /HPF — SIGNIFICANT CHANGE UP (ref 0–4)
SODIUM SERPL-SCNC: 134 MMOL/L — LOW (ref 135–145)
SP GR SPEC: 1.01 — SIGNIFICANT CHANGE UP (ref 1.01–1.02)
UROBILINOGEN FLD QL: SIGNIFICANT CHANGE UP
WBC # BLD: 9.91 K/UL — SIGNIFICANT CHANGE UP (ref 3.8–10.5)
WBC # FLD AUTO: 9.91 K/UL — SIGNIFICANT CHANGE UP (ref 3.8–10.5)
WBC UR QL: 6 /HPF — HIGH (ref 0–5)

## 2021-03-01 PROCEDURE — 99233 SBSQ HOSP IP/OBS HIGH 50: CPT | Mod: GC

## 2021-03-01 PROCEDURE — 74018 RADEX ABDOMEN 1 VIEW: CPT | Mod: 26

## 2021-03-01 PROCEDURE — 49465 FLUORO EXAM OF G/COLON TUBE: CPT

## 2021-03-01 RX ORDER — IOHEXOL 300 MG/ML
30 INJECTION, SOLUTION INTRAVENOUS ONCE
Refills: 0 | Status: COMPLETED | OUTPATIENT
Start: 2021-03-01 | End: 2021-03-01

## 2021-03-01 RX ADMIN — Medication 650 MILLIGRAM(S): at 17:50

## 2021-03-01 RX ADMIN — ALBUTEROL 2 PUFF(S): 90 AEROSOL, METERED ORAL at 21:02

## 2021-03-01 RX ADMIN — CHLORHEXIDINE GLUCONATE 15 MILLILITER(S): 213 SOLUTION TOPICAL at 06:04

## 2021-03-01 RX ADMIN — RIVAROXABAN 20 MILLIGRAM(S): KIT at 17:51

## 2021-03-01 RX ADMIN — Medication 2: at 12:15

## 2021-03-01 RX ADMIN — Medication 1 TABLET(S): at 17:51

## 2021-03-01 RX ADMIN — IOHEXOL 30 MILLILITER(S): 300 INJECTION, SOLUTION INTRAVENOUS at 14:00

## 2021-03-01 RX ADMIN — Medication 1 TABLET(S): at 23:40

## 2021-03-01 RX ADMIN — Medication 50 MILLIGRAM(S): at 17:51

## 2021-03-01 RX ADMIN — Medication 1 TABLET(S): at 17:50

## 2021-03-01 RX ADMIN — Medication 1 APPLICATION(S): at 06:05

## 2021-03-01 RX ADMIN — ALBUTEROL 2 PUFF(S): 90 AEROSOL, METERED ORAL at 03:22

## 2021-03-01 RX ADMIN — ALBUTEROL 2 PUFF(S): 90 AEROSOL, METERED ORAL at 15:29

## 2021-03-01 RX ADMIN — PANTOPRAZOLE SODIUM 40 MILLIGRAM(S): 20 TABLET, DELAYED RELEASE ORAL at 12:14

## 2021-03-01 RX ADMIN — NYSTATIN CREAM 1 APPLICATION(S): 100000 CREAM TOPICAL at 06:04

## 2021-03-01 RX ADMIN — CHOLESTYRAMINE 4 GRAM(S): 4 POWDER, FOR SUSPENSION ORAL at 22:29

## 2021-03-01 RX ADMIN — NYSTATIN CREAM 1 APPLICATION(S): 100000 CREAM TOPICAL at 12:15

## 2021-03-01 RX ADMIN — CHLORHEXIDINE GLUCONATE 1 APPLICATION(S): 213 SOLUTION TOPICAL at 12:15

## 2021-03-01 RX ADMIN — Medication 1 PACKET(S): at 17:52

## 2021-03-01 RX ADMIN — NYSTATIN CREAM 1 APPLICATION(S): 100000 CREAM TOPICAL at 22:29

## 2021-03-01 RX ADMIN — CHLORHEXIDINE GLUCONATE 15 MILLILITER(S): 213 SOLUTION TOPICAL at 17:51

## 2021-03-01 RX ADMIN — ALBUTEROL 2 PUFF(S): 90 AEROSOL, METERED ORAL at 07:57

## 2021-03-01 RX ADMIN — Medication 1 APPLICATION(S): at 17:52

## 2021-03-01 NOTE — SWALLOW BEDSIDE ASSESSMENT ADULT - SWALLOW EVAL: DIAGNOSIS
Patient presents with mild oropharyngeal dysphagia marked by adequate stripping of bolus from utensil, adequate oral containment, extended mastication for regular solids with slow bolus manipulation and delay in oral transit time. There was laryngeal elevation upon digital palpation with initiation of pharyngeal swallow. Inconsistent evidence of multiple swallows upon palpation per trial though patient denies sensation of pharyngeal stasis. No overt s/s of laryngeal penetration/aspiration for puree consistency, solids, honey thick liquids, nectar thick liquids and thin liquids.

## 2021-03-01 NOTE — SWALLOW BEDSIDE ASSESSMENT ADULT - SWALLOW EVAL: RECOMMENDED FEEDING/EATING TECHNIQUES
alternate food with liquid/hard swallow w/ each bite or sip/maintain upright posture during/after eating for 30 mins/oral hygiene/position upright (90 degrees)/small sips/bites

## 2021-03-01 NOTE — SWALLOW BEDSIDE ASSESSMENT ADULT - COMMENTS
Pulmonary Progress Note 3/1/21: 59 YO F with PMHx of HTN, PE/DVT on Xarelto, Peritonitis s/p Oral's Procedure and Ileostomy (reversed in 2011) and OHS on BIPAP 16/9 [facility] outpatient who presented to [facility] on 11/18 for AHRF second to COVID 19, intubated on 11/23 complicated by R lung abscesses on CT CHEST 12/5, multibacterial PNA and bacteremia, ARTEMIO s/p CRRT and HD, and s/p Tracheostomy 12/18 c/b track leak and s/p OR 8Bivona. Transferred to [facility] for further management.     Decannulated 3/1/21    Patient was seen upright at bedside with nasal canula in place and bandage over stoma site. Patient was alert/awake and easily engaged in conversation. Patient able to follow simple directions. Patient with low volume/ mildly dysphonic vocal quality prior to PO consumption. BASELINE STATS: SpO2 98%  Patient was

## 2021-03-01 NOTE — PROGRESS NOTE ADULT - ATTENDING COMMENTS
as above  doing great  awake and alert, with pmv - strong voice, strong cough, no sig secretions  pt decannulated this morning.  monitor at night, if needed can re-institute nocturnal nppv which she had at home - though has also now lost a sig amount of weight

## 2021-03-01 NOTE — CHART NOTE - NSCHARTNOTEFT_GEN_A_CORE
RN reported that patient has Temp of 101 (oral), felt warm to touch, and was tachycardic. Other VSS.  Pt denies cough, CP, Abdominal pain, diarrhea.    Will administer Tylenol once, and Ice pack as needed  Blood cx x 2, UA, CXR, sputum cx ordered stat.  Will continue to monitor closely. No obvious signs of infection at this time.

## 2021-03-01 NOTE — SWALLOW BEDSIDE ASSESSMENT ADULT - ADDITIONAL RECOMMENDATIONS
1. Monitor for PO tolerance/intake  2. Consideration for ENT consultation at MD's discretion should dysphonia persist

## 2021-03-01 NOTE — PROGRESS NOTE ADULT - ASSESSMENT
60 year old female with prolonged hospital course secondary to COVID 19, GI consulted for persistent diarrhea     Problem/Recommendation - 1:  Problem: COVID-19. Recommendation: status post treatment by infectious disease  now off of isolation.     Problem/Recommendation - 2:  ·  Problem: Respiratory failure.  Recommendation: status post tracheostomy, now downsized, on trach collar.      Problem/Recommendation - 3:  ·  Problem: Diarrhea.  Recommendation: seems somewhat improved on current regimen, stool output 50cc overnight. Added metamucil to bulk up stool. Stool output improved. Continue lomotil as well.     Problem/Recommendation - 4:  ·  Problem: Hernia, incisional.  Recommendation: reducible, nontender. No signs of incarceration. Randi has hx of bowel resection, but she cannot provide the exact circumstances.  -serial abdominal exams.      Problem/Recommendation - 5:  ·  Problem: Gastrostomy present.  Recommendation: not functioning.  -replaced at bedside  -Xray tube checked ordered     Problem/Recommendation - 6:  Problem: ARTEMIO (acute kidney injury). Recommendation: s/p CVVHD, now improved.     Problem/Recommendation - 7:  Problem: History of pressure ulcer. Recommendation: per R[facility] team.     Problem/Recommendation - 8:  Problem: Clostridium difficile infection. Recommendation: status post 2 weeks of PO vanco  -vanco per ID on hold.    Attending Attestation:   Differential diagnosis and plan of care discussed with patient after the evaluation  75 Minutes spent on total encounter of which more than fifty percent of the encounter was spent counseling and/or coordinating care by the attending physician.      Ranjeet Ponce M.D.   Gastroenterology and Hepatology  Cell: [phone].

## 2021-03-01 NOTE — PROGRESS NOTE ADULT - SUBJECTIVE AND OBJECTIVE BOX
CHIEF COMPLAINT: Patient is a 60y old  Female who presents with a chief complaint of Transfer from [facility] (28 Feb 2021 13:10)    Interval Events: None reported overnight. GI will follow to unclog PEG tube today. Speech and swallow eval pending for today.     REVIEW OF SYSTEMS:  Constitutional:   Eyes:  ENT:  CV:  Resp:  GI:  :  MSK:  Integumentary:  Neurological:  Psychiatric:  Endocrine:  Hematologic/Lymphatic:  Allergic/Immunologic:  [ ] All other systems negative  [ ] Unable to assess ROS because ________    OBJECTIVE:  ICU Vital Signs Last 24 Hrs  T(C): 36.8 (01 Mar 2021 05:52), Max: 37.2 (28 Feb 2021 16:00)  T(F): 98.2 (01 Mar 2021 05:52), Max: 99 (28 Feb 2021 16:00)  HR: 94 (01 Mar 2021 07:55) (82 - 106)  BP: 149/71 (01 Mar 2021 05:52) (130/70 - 149/71)  BP(mean): 86 (28 Feb 2021 16:00) (86 - 86)  ABP: --  ABP(mean): --  RR: 20 (01 Mar 2021 05:52) (18 - 22)  SpO2: 95% (01 Mar 2021 07:55) (93% - 100%)    Mode: standby    02-28 @ 07:01  -  03-01 @ 07:00  --------------------------------------------------------  IN: 1300 mL / OUT: 450 mL / NET: 850 mL      CAPILLARY BLOOD GLUCOSE      POCT Blood Glucose.: 150 mg/dL (01 Mar 2021 04:39)      PHYSICAL EXAM:  General:   HEENT:   Lymph Nodes:  Neck:   Respiratory:   Cardiovascular:   Abdomen:   Extremities:   Skin:   Neurological:  Psychiatry:    HOSPITAL MEDICATIONS:  MEDICATIONS  (STANDING):  ALBUTerol    90 MICROgram(s) HFA Inhaler 2 Puff(s) Inhalation every 6 hours  amLODIPine   Tablet 5 milliGRAM(s) Oral daily  chlorhexidine 0.12% Liquid 15 milliLiter(s) Oral Mucosa every 12 hours  chlorhexidine 4% Liquid 1 Application(s) Topical daily  cholestyramine Powder (Sugar-Free) 4 Gram(s) Oral two times a day  clonazePAM  Tablet 0.5 milliGRAM(s) Oral daily  collagenase Ointment 1 Application(s) Topical two times a day  dextrose 40% Gel 15 Gram(s) Oral once  dextrose 5%. 1000 milliLiter(s) (50 mL/Hr) IV Continuous <Continuous>  dextrose 5%. 1000 milliLiter(s) (100 mL/Hr) IV Continuous <Continuous>  dextrose 5%. 1000 milliLiter(s) (75 mL/Hr) IV Continuous <Continuous>  dextrose 50% Injectable 25 Gram(s) IV Push once  dextrose 50% Injectable 12.5 Gram(s) IV Push once  dextrose 50% Injectable 25 Gram(s) IV Push once  diphenoxylate/atropine 1 Tablet(s) Oral four times a day  glucagon  Injectable 1 milliGRAM(s) IntraMuscular once  insulin lispro (ADMELOG) corrective regimen sliding scale   SubCutaneous every 6 hours  lactobacillus acidophilus 1 Tablet(s) Oral two times a day  metoprolol tartrate 50 milliGRAM(s) Oral two times a day  multivitamin 1 Tablet(s) Oral daily  nystatin Powder 1 Application(s) Topical every 8 hours  pantoprazole  Injectable 40 milliGRAM(s) IV Push daily  psyllium Powder 1 Packet(s) Oral two times a day  rivaroxaban 20 milliGRAM(s) Oral with dinner  sodium bicarbonate 650 milliGRAM(s) Oral once  Viokase 10,440 units 1 Tablet(s) 1 Tablet(s) Enteral Tube once  zinc sulfate 220 milliGRAM(s) Oral daily    MEDICATIONS  (PRN):  acetaminophen    Suspension .. 650 milliGRAM(s) Oral every 6 hours PRN Temp greater or equal to 38C (100.4F), Mild Pain (1 - 3), Moderate Pain (4 - 6)  artificial tears (preservative free) Ophthalmic Solution 1 Drop(s) Both EYES every 8 hours PRN Dry Eyes  guaiFENesin   Syrup  (Sugar-Free) 100 milliGRAM(s) Oral every 6 hours PRN Cough  oxyCODONE    Solution 5 milliGRAM(s) Oral every 4 hours PRN Severe Pain (7 - 10)      LABS:                        11.4   9.91  )-----------( 421      ( 01 Mar 2021 07:32 )             37.4     03-01    134<L>  |  97<L>  |  7   ----------------------------<  133<H>  3.5   |  24  |  0.34<L>    Ca    9.9      01 Mar 2021 07:32  Phos  4.9     03-01  Mg     1.7     03-01                MICROBIOLOGY:     RADIOLOGY:  [ ] Reviewed and interpreted by me    PULMONARY FUNCTION TESTS:    EKG: CHIEF COMPLAINT: Patient is a 60y old  Female who presents with a chief complaint of Transfer from [facility] (28 Feb 2021 13:10)    Interval Events: None reported overnight. s/p PEG tube unclogged today by Demetra LYLE Xray with contrast to assess for positioning ordered. Patient was successfully decannulated at bedside during morning round. She remained HDS, saturating well on 2LNC. Speech and swallow eval done today, patient has been approved to start on Mechanical soft/Thin liquid. No new complaints/concerns at this time. Will continue with current management and monitor closely.    REVIEW OF SYSTEMS:  See above  [X] All other systems negative      OBJECTIVE:  ICU Vital Signs Last 24 Hrs  T(C): 36.8 (01 Mar 2021 05:52), Max: 37.2 (28 Feb 2021 16:00)  T(F): 98.2 (01 Mar 2021 05:52), Max: 99 (28 Feb 2021 16:00)  HR: 94 (01 Mar 2021 07:55) (82 - 106)  BP: 149/71 (01 Mar 2021 05:52) (130/70 - 149/71)  BP(mean): 86 (28 Feb 2021 16:00) (86 - 86)  ABP: --  ABP(mean): --  RR: 20 (01 Mar 2021 05:52) (18 - 22)  SpO2: 95% (01 Mar 2021 07:55) (93% - 100%)    Mode: standby    02-28 @ 07:01  -  03-01 @ 07:00  --------------------------------------------------------  IN: 1300 mL / OUT: 450 mL / NET: 850 mL      CAPILLARY BLOOD GLUCOSE      POCT Blood Glucose.: 150 mg/dL (01 Mar 2021 04:39)    PHYSICAL EXAM  GENERAL: Morbidly obese, NAD  NECK: Supple, No JVD, Tracheostomy stoma covered with dressing   LUNG: + Mildly coarse to b/l Upper bases, Normal respiratory effort.  HEART: +s1, s2  ABDOMEN: +BS, soft, nt/nd, obese abdomen, no peritoneals signs noted, +PEG, site c/d/i.   EXTREMITIES: +2 pitting edema b/l lower ext.  NEUROLOGY: A&0x3, non-focal, no neuro deficits   SKIN: as per AAI sheet RN monitoring    HOSPITAL MEDICATIONS:  MEDICATIONS  (STANDING):  ALBUTerol    90 MICROgram(s) HFA Inhaler 2 Puff(s) Inhalation every 6 hours  amLODIPine   Tablet 5 milliGRAM(s) Oral daily  chlorhexidine 0.12% Liquid 15 milliLiter(s) Oral Mucosa every 12 hours  chlorhexidine 4% Liquid 1 Application(s) Topical daily  cholestyramine Powder (Sugar-Free) 4 Gram(s) Oral two times a day  clonazePAM  Tablet 0.5 milliGRAM(s) Oral daily  collagenase Ointment 1 Application(s) Topical two times a day  dextrose 40% Gel 15 Gram(s) Oral once  dextrose 5%. 1000 milliLiter(s) (50 mL/Hr) IV Continuous <Continuous>  dextrose 5%. 1000 milliLiter(s) (100 mL/Hr) IV Continuous <Continuous>  dextrose 5%. 1000 milliLiter(s) (75 mL/Hr) IV Continuous <Continuous>  dextrose 50% Injectable 25 Gram(s) IV Push once  dextrose 50% Injectable 12.5 Gram(s) IV Push once  dextrose 50% Injectable 25 Gram(s) IV Push once  diphenoxylate/atropine 1 Tablet(s) Oral four times a day  glucagon  Injectable 1 milliGRAM(s) IntraMuscular once  insulin lispro (ADMELOG) corrective regimen sliding scale   SubCutaneous every 6 hours  lactobacillus acidophilus 1 Tablet(s) Oral two times a day  metoprolol tartrate 50 milliGRAM(s) Oral two times a day  multivitamin 1 Tablet(s) Oral daily  nystatin Powder 1 Application(s) Topical every 8 hours  pantoprazole  Injectable 40 milliGRAM(s) IV Push daily  psyllium Powder 1 Packet(s) Oral two times a day  rivaroxaban 20 milliGRAM(s) Oral with dinner  sodium bicarbonate 650 milliGRAM(s) Oral once  Viokase 10,440 units 1 Tablet(s) 1 Tablet(s) Enteral Tube once  zinc sulfate 220 milliGRAM(s) Oral daily    MEDICATIONS  (PRN):  acetaminophen    Suspension .. 650 milliGRAM(s) Oral every 6 hours PRN Temp greater or equal to 38C (100.4F), Mild Pain (1 - 3), Moderate Pain (4 - 6)  artificial tears (preservative free) Ophthalmic Solution 1 Drop(s) Both EYES every 8 hours PRN Dry Eyes  guaiFENesin   Syrup  (Sugar-Free) 100 milliGRAM(s) Oral every 6 hours PRN Cough  oxyCODONE    Solution 5 milliGRAM(s) Oral every 4 hours PRN Severe Pain (7 - 10)      LABS:                        11.4   9.91  )-----------( 421      ( 01 Mar 2021 07:32 )             37.4     03-01    134<L>  |  97<L>  |  7   ----------------------------<  133<H>  3.5   |  24  |  0.34<L>    Ca    9.9      01 Mar 2021 07:32  Phos  4.9     03-01  Mg     1.7     03-01                MICROBIOLOGY:     RADIOLOGY:  [ ] Reviewed and interpreted by me    PULMONARY FUNCTION TESTS:    EKG:

## 2021-03-01 NOTE — PROGRESS NOTE ADULT - ASSESSMENT
59 YO F with PMHx of HTN, PE/DVT on Xarelto, Peritonitis s/p Oral's Procedure and Ileostomy (reversed in 2011) and OHS on BIPAP 16/9 QHS outpatient who presented to [facility] on 11/18 for AHRF second to COVID 19, intubated on 11/23 complicated by R lung abscesses on CT CHEST 12/5, multibacterial PNA and bacteremia, ARTEMIO s/p CRRT and HD, and s/p Tracheostomy 12/18 c/b track leak and s/p OR 8Bivona. Transferred to [facility] for furhter management.     # AMS/ Anxiety   - AOX3 at baseline.   - CTH and EEG WNL   - Anxiety noted and Klonopin started.   - Supportive care.     # ARDS second to COVID vs Multi-bacterial PNA/ R Lung Abscess  - s/p Intubated on 11/23 and c/b multi-bacterial PNA and lung abscesses.   - s/p Tracheostomy on 12/18 by CTSx. Sutures out 1/1  - s/p Air leak noted and s/p Bivona with CTSx 12/31. Now downsized to 6DCT 2/25  - Tolerating TC with PMV QD and PS 16/9 QHS .   - Proventil and Chest PT Q6H and Suction PRN. Trach Care QD.     # Vasoplegic vs Septic Shock   - c/w Norvasc and Metoprolol.   - On full AC for Hx of DVT. CTA CHEST with no PEs.     # ARTEMIO s/p CRRT and HD   - ARTEMIO now resolved and no longer required HD.   - Hypokalemia in setting of diarrhea/ CDIFF. Monitor electrolytes.   - Monitor renal function and avoid nephrotoxins.     # GI   - s/p PEG placement with IR 1/11 - Currently Clogged  - s/p Trial of Viokase, GI will attempt tomorrow morning  - TF on hold for now.    - Speech and Swallow evaluation pending    # Sepsis second to COVID vs Multi-bacterial PNA/ Bacteremia and R Lung Abscess vs CDIFF (+)   - COVID with superimposed multi-bacterial VAP vs aspiration PNA vs cavitary PNA.   - SCx (11/24) MSSA and BCx (11/27) with MSSA and Proteus Bacteremia  - SCx (12/1, 12/12 and 12/27) with Stenotrophomonas.   - SCx (1/9) with  Pseudomonas and Stenotrophomonas   - Bcx has been persistently negative from 12/1, and most recently 1/12   - CT CHEST 12/4 with right lung cavitation.   - s/p multiple antibiotics, but now completed Fortaz and Flagyl (1/12-1/16)  - RPT CT CHEST 1/28 with improvement of right lung abscess   - SCx with  Pseudomonas and Stenotrophomonas and s/p TED/ Fortaz (1/28-2/6)    # CDIFF (+)   - s/p PO Vancomycin 2/1-2/25 with no improvement in diarrhea.   - Cholestyramine attempted, but failed.   - Stool O&P and GI PCR NGTD. Check Stool Cx. c/w Lomotil QID trial for now.   - ID following     # DMII   - Continue on ISS and monitor FS. Adjust insulin as needed.     # Hx of DVT   - c/w Xarelto.     # Skin/ Facial Wounds   - WOC recommendations appreciated   - Plastics evaluation appreciated, pending re-evaluation     # Contracted Wrists   - PT/OT working on strength.     # DVT/ GI PPX with FULL AC/ PPI   # CODE STATUS - FULL CODE   # DISPO - PT recc Rehab/ Vent Facility    61 YO F with PMHx of HTN, PE/DVT on Xarelto, Peritonitis s/p Oral's Procedure and Ileostomy (reversed in 2011) and OHS on BIPAP 16/9 QHS outpatient who presented to [facility] on 11/18 for AHRF second to COVID 19, intubated on 11/23 complicated by R lung abscesses on CT CHEST 12/5, multibacterial PNA and bacteremia, ARTEMIO s/p CRRT and HD, and s/p Tracheostomy 12/18 c/b track leak and s/p OR 8Bivona. Transferred to [facility] for furhter management.     # AMS/ Anxiety   - AOX3 at baseline.   - CTH and EEG WNL   - Anxiety noted and c/w Klonopin 0.5 mg PO daily.   - Supportive care.     # ARDS second to COVID vs Multi-bacterial PNA/ R Lung Abscess  - s/p Intubated on 11/23 and c/b multi-bacterial PNA and lung abscesses.   - s/p Tracheostomy on 12/18 by CTSx. Sutures out 1/1  - s/p Air leak noted and s/p Bivona with CTSx 12/31. s/p Downsized to 6DCT 2/25  - s/p Successfully decannulated on 3/1, Now on 2LNC saturating well  - Proventil and Chest PT q6hr PRN    # Vasoplegic vs Septic Shock   - c/w Norvasc and Metoprolol.   - On full AC for Hx of DVT. CTA CHEST with no PEs.     # ARTEMIO s/p CRRT and HD   - ARTEMIO now resolved and no longer required HD.   - Hypokalemia in setting of diarrhea/ CDIFF. Monitor electrolytes.   - Monitor renal function and avoid nephrotoxins.     # GI   - s/p PEG placement with IR 1/11   - PEG tube unclogged this morning by GI, Abd Xray with contrast pending to assess positioning  - TF on hold for now.    - Speech and Swallow evaluation done today, Pt cleared to start on Mechanical soft with Thin liquids    # Sepsis second to COVID vs Multi-bacterial PNA/ Bacteremia and R Lung Abscess vs CDIFF (+)   - COVID with superimposed multi-bacterial VAP vs aspiration PNA vs cavitary PNA.   - SCx (11/24) MSSA and BCx (11/27) with MSSA and Proteus Bacteremia  - SCx (12/1, 12/12 and 12/27) with Stenotrophomonas.   - SCx (1/9) with  Pseudomonas and Stenotrophomonas   - Bcx has been persistently negative from 12/1, and most recently 1/12   - CT CHEST 12/4 with right lung cavitation.   - s/p multiple antibiotics, but now completed Fortaz and Flagyl (1/12-1/16)  - RPT CT CHEST 1/28 with improvement of right lung abscess   - SCx with  Pseudomonas and Stenotrophomonas and s/p TED/ Fortaz (1/28-2/6)    # CDIFF (+)   - s/p PO Vancomycin 2/1-2/25 with no improvement in diarrhea.   - Cholestyramine attempted, but failed.   - Stool O&P and GI PCR NGTD. Check Stool Cx. c/w Lomotil QID trial for now.   - ID following     # DMII   - Continue on ISS and monitor FS. Adjust insulin as needed.     # Hx of DVT   - c/w Xarelto.     # Skin/ Facial Wounds   - WOC recommendations appreciated   - Plastics evaluation appreciated, pending re-evaluation     # Contracted Wrists   - PT/OT working on strength.     # DVT/ GI PPX with FULL AC/ PPI   # CODE STATUS - FULL CODE   # DISPO - PT recc Rehab/ Vent Facility

## 2021-03-01 NOTE — PROGRESS NOTE ADULT - SUBJECTIVE AND OBJECTIVE BOX
Chief Complaint:  Patient is a 60y old  Female who presents with a chief complaint of Transfer from [facility] (2021 08:34)      Interval Events:   50 cc stool ouput  G tube malfunction    Allergies:  Kiwi (Unknown)  latex (Anaphylaxis)  latex (Unknown)  penicillin (Other)  penicillin (Unknown)  perfume  hives (Other)  potassium acetate (Other)  soap additives hives (Other)      Hospital Medications:  acetaminophen    Suspension .. 650 milliGRAM(s) Oral every 6 hours PRN  ALBUTerol    90 MICROgram(s) HFA Inhaler 2 Puff(s) Inhalation every 6 hours  amLODIPine   Tablet 5 milliGRAM(s) Oral daily  artificial tears (preservative free) Ophthalmic Solution 1 Drop(s) Both EYES every 8 hours PRN  chlorhexidine 0.12% Liquid 15 milliLiter(s) Oral Mucosa every 12 hours  chlorhexidine 4% Liquid 1 Application(s) Topical daily  cholestyramine Powder (Sugar-Free) 4 Gram(s) Oral two times a day  clonazePAM  Tablet 0.5 milliGRAM(s) Oral daily  collagenase Ointment 1 Application(s) Topical two times a day  dextrose 40% Gel 15 Gram(s) Oral once  dextrose 5%. 1000 milliLiter(s) IV Continuous <Continuous>  dextrose 5%. 1000 milliLiter(s) IV Continuous <Continuous>  dextrose 5%. 1000 milliLiter(s) IV Continuous <Continuous>  dextrose 50% Injectable 25 Gram(s) IV Push once  dextrose 50% Injectable 12.5 Gram(s) IV Push once  dextrose 50% Injectable 25 Gram(s) IV Push once  diphenoxylate/atropine 1 Tablet(s) Oral four times a day  glucagon  Injectable 1 milliGRAM(s) IntraMuscular once  guaiFENesin   Syrup  (Sugar-Free) 100 milliGRAM(s) Oral every 6 hours PRN  insulin lispro (ADMELOG) corrective regimen sliding scale   SubCutaneous every 6 hours  lactobacillus acidophilus 1 Tablet(s) Oral two times a day  metoprolol tartrate 50 milliGRAM(s) Oral two times a day  multivitamin 1 Tablet(s) Oral daily  nystatin Powder 1 Application(s) Topical every 8 hours  oxyCODONE    Solution 5 milliGRAM(s) Oral every 4 hours PRN  pantoprazole  Injectable 40 milliGRAM(s) IV Push daily  rivaroxaban 20 milliGRAM(s) Oral with dinner  sodium bicarbonate 650 milliGRAM(s) Oral once  Viokase 10,440 units 1 Tablet(s) 1 Tablet(s) Enteral Tube once  zinc sulfate 220 milliGRAM(s) Oral daily      PMHX/PSHX:  Pneumomediastinum    Umbilical hernia    Osteoarthritis of both knees, unspecified osteoarthritis type    AARON (Obstructive Sleep Apnea)    Pneumonia    S/P Colon Resection    pt with allergies to perfumes and addidtive to soaps, requires hypoallergenic sheets and gowns, line    Cervical Dysplasia    Latex Sensitivity    GERD (Gastroesophageal Reflux Disease)    Asthma    Anemia    S/P Joint Replacement    DVT (Deep Venous Thrombosis)    HTN (Hypertension)    H/O ileostomy    S/P LEEP    S/P Exploratory Laparotomy    S/P Colonoscopy    S/P Endoscopy    S/P  Section    History of Tubal Ligation    S/P Knee Surgery    Umbilical Hernia    Osteoarthritis of Knee    HTN (Hypertension)        Family history:  Family history of gastric cancer    Family history of breast cancer (Grandparent)        ROS:     General:  No wt loss, fevers, chills, night sweats, fatigue,   Eyes:  Good vision, no reported pain  ENT:  No sore throat, pain, runny nose, dysphagia  CV:  No pain, palpitations, hypo/hypertension  Resp:  No dyspnea, cough, tachypnea, wheezing  GI:  See HPI  :  No pain, bleeding, incontinence, nocturia  Muscle:  No pain, weakness  Neuro:  No weakness, tingling, memory problems  Psych:  No fatigue, insomnia, mood problems, depression  Endocrine:  No polyuria, polydipsia, cold/heat intolerance  Heme:  No petechiae, ecchymosis, easy bruisability  Skin:  No rash, edema      PHYSICAL EXAM:     GENERAL:  Appears stated age, well-groomed, well-nourished, no distress  HEENT:  NC/AT,  conjunctivae clear, sclera-anicteric  NECK: Trachea midline, supple  CHEST:  Full & symmetric excursion, no increased effort, breath sounds clear  HEART:  Regular rhythm, no lola/heave  ABDOMEN:  Soft, non-tender, non-distended, normoactive bowel sounds,  no masses ,no hepato-splenomegaly, gastrostomy non-functioning, reducible hernia  EXTREMITIES:  no cyanosis,clubbing or edema  SKIN:  No rash/erythema/petechiae, no jaundice  NEURO:  Alert, oriented, no asterixis, bed-bound  RECTAL: Deferred    Vital Signs:  Vital Signs Last 24 Hrs  T(C): 37.4 (2021 08:00), Max: 37.4 (2021 08:00)  T(F): 99.3 (2021 08:00), Max: 99.3 (2021 08:00)  HR: 105 (2021 10:48) (94 - 112)  BP: 136/70 (2021 08:00) (129/60 - 147/64)  BP(mean): --  RR: 22 (2021 08:00) (19 - 24)  SpO2: 96% (2021 10:48) (95% - 100%)  Daily     Daily     LABS:                    Imaging:
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
## 2021-03-02 LAB
ANION GAP SERPL CALC-SCNC: 13 MMOL/L — SIGNIFICANT CHANGE UP (ref 7–14)
BUN SERPL-MCNC: 6 MG/DL — LOW (ref 7–23)
CALCIUM SERPL-MCNC: 9.9 MG/DL — SIGNIFICANT CHANGE UP (ref 8.4–10.5)
CHLORIDE SERPL-SCNC: 99 MMOL/L — SIGNIFICANT CHANGE UP (ref 98–107)
CO2 SERPL-SCNC: 25 MMOL/L — SIGNIFICANT CHANGE UP (ref 22–31)
CREAT SERPL-MCNC: 0.37 MG/DL — LOW (ref 0.5–1.3)
GLUCOSE BLDC GLUCOMTR-MCNC: 123 MG/DL — HIGH (ref 70–99)
GLUCOSE BLDC GLUCOMTR-MCNC: 125 MG/DL — HIGH (ref 70–99)
GLUCOSE BLDC GLUCOMTR-MCNC: 130 MG/DL — HIGH (ref 70–99)
GLUCOSE BLDC GLUCOMTR-MCNC: 137 MG/DL — HIGH (ref 70–99)
GLUCOSE SERPL-MCNC: 121 MG/DL — HIGH (ref 70–99)
HCT VFR BLD CALC: 39.7 % — SIGNIFICANT CHANGE UP (ref 34.5–45)
HGB BLD-MCNC: 11.4 G/DL — LOW (ref 11.5–15.5)
MAGNESIUM SERPL-MCNC: 1.7 MG/DL — SIGNIFICANT CHANGE UP (ref 1.6–2.6)
MCHC RBC-ENTMCNC: 26.6 PG — LOW (ref 27–34)
MCHC RBC-ENTMCNC: 28.7 GM/DL — LOW (ref 32–36)
MCV RBC AUTO: 92.5 FL — SIGNIFICANT CHANGE UP (ref 80–100)
NRBC # BLD: 0 /100 WBCS — SIGNIFICANT CHANGE UP
NRBC # FLD: 0 K/UL — SIGNIFICANT CHANGE UP
PHOSPHATE SERPL-MCNC: 4.6 MG/DL — HIGH (ref 2.5–4.5)
PLATELET # BLD AUTO: 456 K/UL — HIGH (ref 150–400)
POTASSIUM SERPL-MCNC: 3.3 MMOL/L — LOW (ref 3.5–5.3)
POTASSIUM SERPL-SCNC: 3.3 MMOL/L — LOW (ref 3.5–5.3)
RBC # BLD: 4.29 M/UL — SIGNIFICANT CHANGE UP (ref 3.8–5.2)
RBC # FLD: 15.9 % — HIGH (ref 10.3–14.5)
SODIUM SERPL-SCNC: 137 MMOL/L — SIGNIFICANT CHANGE UP (ref 135–145)
WBC # BLD: 10.38 K/UL — SIGNIFICANT CHANGE UP (ref 3.8–10.5)
WBC # FLD AUTO: 10.38 K/UL — SIGNIFICANT CHANGE UP (ref 3.8–10.5)

## 2021-03-02 PROCEDURE — 99233 SBSQ HOSP IP/OBS HIGH 50: CPT | Mod: GC

## 2021-03-02 RX ORDER — POTASSIUM CHLORIDE 20 MEQ
40 PACKET (EA) ORAL EVERY 4 HOURS
Refills: 0 | Status: COMPLETED | OUTPATIENT
Start: 2021-03-02 | End: 2021-03-02

## 2021-03-02 RX ADMIN — CHOLESTYRAMINE 4 GRAM(S): 4 POWDER, FOR SUSPENSION ORAL at 09:40

## 2021-03-02 RX ADMIN — Medication 40 MILLIEQUIVALENT(S): at 09:39

## 2021-03-02 RX ADMIN — Medication 1 APPLICATION(S): at 06:29

## 2021-03-02 RX ADMIN — ALBUTEROL 2 PUFF(S): 90 AEROSOL, METERED ORAL at 23:20

## 2021-03-02 RX ADMIN — PANTOPRAZOLE SODIUM 40 MILLIGRAM(S): 20 TABLET, DELAYED RELEASE ORAL at 11:26

## 2021-03-02 RX ADMIN — Medication 650 MILLIGRAM(S): at 11:26

## 2021-03-02 RX ADMIN — NYSTATIN CREAM 1 APPLICATION(S): 100000 CREAM TOPICAL at 15:07

## 2021-03-02 RX ADMIN — RIVAROXABAN 20 MILLIGRAM(S): KIT at 17:48

## 2021-03-02 RX ADMIN — Medication 1 TABLET(S): at 22:57

## 2021-03-02 RX ADMIN — Medication 1 TABLET(S): at 06:31

## 2021-03-02 RX ADMIN — ZINC SULFATE TAB 220 MG (50 MG ZINC EQUIVALENT) 220 MILLIGRAM(S): 220 (50 ZN) TAB at 11:26

## 2021-03-02 RX ADMIN — AMLODIPINE BESYLATE 5 MILLIGRAM(S): 2.5 TABLET ORAL at 06:28

## 2021-03-02 RX ADMIN — Medication 50 MILLIGRAM(S): at 17:48

## 2021-03-02 RX ADMIN — NYSTATIN CREAM 1 APPLICATION(S): 100000 CREAM TOPICAL at 06:30

## 2021-03-02 RX ADMIN — Medication 1 TABLET(S): at 06:33

## 2021-03-02 RX ADMIN — Medication 1 TABLET(S): at 17:48

## 2021-03-02 RX ADMIN — Medication 1 PACKET(S): at 17:49

## 2021-03-02 RX ADMIN — CHLORHEXIDINE GLUCONATE 1 APPLICATION(S): 213 SOLUTION TOPICAL at 11:26

## 2021-03-02 RX ADMIN — CHOLESTYRAMINE 4 GRAM(S): 4 POWDER, FOR SUSPENSION ORAL at 22:47

## 2021-03-02 RX ADMIN — ALBUTEROL 2 PUFF(S): 90 AEROSOL, METERED ORAL at 07:43

## 2021-03-02 RX ADMIN — NYSTATIN CREAM 1 APPLICATION(S): 100000 CREAM TOPICAL at 22:46

## 2021-03-02 RX ADMIN — Medication 1 PACKET(S): at 06:33

## 2021-03-02 RX ADMIN — Medication 50 MILLIGRAM(S): at 06:30

## 2021-03-02 RX ADMIN — Medication 1 APPLICATION(S): at 17:49

## 2021-03-02 RX ADMIN — Medication 1 TABLET(S): at 11:26

## 2021-03-02 RX ADMIN — CHLORHEXIDINE GLUCONATE 15 MILLILITER(S): 213 SOLUTION TOPICAL at 06:28

## 2021-03-02 RX ADMIN — Medication 0.5 MILLIGRAM(S): at 11:26

## 2021-03-02 RX ADMIN — Medication 40 MILLIEQUIVALENT(S): at 15:07

## 2021-03-02 RX ADMIN — ALBUTEROL 2 PUFF(S): 90 AEROSOL, METERED ORAL at 03:25

## 2021-03-02 RX ADMIN — ALBUTEROL 2 PUFF(S): 90 AEROSOL, METERED ORAL at 15:27

## 2021-03-02 NOTE — PROVIDER CONTACT NOTE (OTHER) - NAME OF MD/NP/PA/DO NOTIFIED:
Felipa Gaitan
Felipa Gaitan NP
Felipa Gaitan NP
Matthew Mayo
PA
Ximena Marques, PA
Robert ACOSTA
[place]
[id number]
Chip Garner, PA
Felipa Gaitan
Felipa Gaitan NP
Felipa Gaitan NP
KARLA Hanks
KARLA Potter
Didi Scherer
Espinoza Hanks
Chip Garner, PA
Chip Garner, PA
Felipa Gaitan
Felipa Gaitan NP
Felipa Gaitan NP
Matthew Mayo
Matthew Mayo.
Ximena Marques, PA
Felipa Gatian NP.
bianka arriaga
Didi Scherer
KARLA Ann
Sonny ACOSTA
Mucous membranes moist and pink without lesions; alveolar ridge smooth and edentulous; lip, palate and uvula with acceptable anatomic shape; normal tongue, frenulum and cheek exam; mandible size acceptable.

## 2021-03-02 NOTE — PROVIDER CONTACT NOTE (OTHER) - BACKGROUND
59 yo pmhx HTN, DVy, presented to [facility] on 11/18 COVID (+)
61yo pmhx HTN, DVT, AARON, COVID-19
COVID
Infection due to SARS-CoV-2.
Infection due to SARS-CoV-2.
Pt admitted for infection due to covid
Pt admitted for infection due to covid
SARS
covid
pt admitted for COVID.
pt admitted for infection due to covid
59yo pmhx HTN, DVT, AARON, COVID-19
Admitted for covid +.
Admitted for covid +.
Infection due to SARS-CoV-2.
Pt admitted for infection due to covid
pt admitted for infection due to covid
59 yo pmh HTN, DVT, COVID19, superimposed PNA and bacteremia with stenotrophomonas, ARTEMIO
Infection due to SARS-CoV-2.
pt admitted for infection due to covid
59yo pmhx HTN, DVT, AARON, COVID-19
Pt 60 year old female admitted for SARS Cov 2.
Pt admitted for COVID
admitted for covid 19
pt admitted for covid-19
Infection SARS-CoV-2.
Infection due to SARS-CoV-2/
pt admitted for infection due to covid
Pt admitted for infection due to covid

## 2021-03-02 NOTE — PROVIDER CONTACT NOTE (OTHER) - SITUATION
Pt is not pulling TV on Vent. Due for trach exchange later today. Respiratory aware.
/91  HR 79
PEG tube is clogged.
Pt temp 100.4, tachypneic 28
Temperature is 100.6F axillary. HR is 105, BP is 137/57 electronically and RR is 22.
pt axillary temperature is 100+ F
Manual /70
Pt FS 98
temperature 103.6
FS 93 after giving juice  tube feeds continued
PEG is clogged. Should amlodipine and metoprolol be held?
RR ranging 30-40s appears restless, Pt denies pain, pt afebrile. Other VS stable.
BP is 129/60 and HR is 108. Patient's PEG is clogged. Okay to hold BP medications?
Temp 100.1F orally
Temperature is 100.3F axillary.
Axillary temperature is 101.6F.
100.1 axillary temp     Pt NPO
FS 79
FS 87  Pt due for NPH, should I hold the NPH?  Tube feeds restarted
FS 89  Tube feeds currently running at 20ml/hr
NG tube clogged/displaced, unable to give 6pm medications.
Peg tube clogged
Pt /62 after returning from trach exchange.
Pt going for trach exchange. Methadone and Valium due at 1400.
Pt oral temp 102.1, , RR 36,
blood in sputum when suctioning through trach
dislodged peg
temp 101.3F
temp 101.8F
temp 101F

## 2021-03-02 NOTE — PROGRESS NOTE ADULT - ASSESSMENT
61 YO F with PMHx of HTN, PE/DVT on Xarelto, Peritonitis s/p Oral's Procedure and Ileostomy (reversed in 2011) and OHS on BIPAP 16/9 [facility] outpatient who presented to [facility] on 11/18 for AHRF second to COVID 19, intubated on 11/23 complicated by R lung abscesses on CT CHEST 12/5, multibacterial PNA and bacteremia, ARTEMIO s/p CRRT and HD, and s/p Tracheostomy 12/18 c/b track leak and s/p OR 8Bivona. Transferred to [facility] for furhter management.     # AMS/ Anxiety   - AOX3 at baseline.   - CTH and EEG WNL   - Anxiety noted and c/w Klonopin 0.5 mg PO daily.   - Supportive care.     # ARDS second to COVID vs Multi-bacterial PNA/ R Lung Abscess  - s/p Intubated on 11/23 and c/b multi-bacterial PNA and lung abscesses.   - s/p Tracheostomy on 12/18 by CTSx. Sutures out 1/1  - s/p Air leak noted and s/p Bivona with CTSx 12/31. s/p Downsized to 6DCT 2/25  - s/p Successfully decannulated on 3/1, Now on 2LNC saturating well  - Proventil and Chest PT q6hr PRN    # Vasoplegic vs Septic Shock   - c/w Norvasc and Metoprolol.   - On full AC for Hx of DVT. CTA CHEST with no PEs.     # ARTEMIO s/p CRRT and HD   - ARTEMIO now resolved and no longer required HD.   - Hypokalemia in setting of diarrhea/ CDIFF. Monitor electrolytes.   - Monitor renal function and avoid nephrotoxins.     # GI   - s/p PEG placement with IR 1/11   - PEG tube unclogged this morning by GI, Abd Xray with contrast pending to assess positioning  - TF on hold for now.    - Speech and Swallow evaluation done today, Pt cleared to start on Mechanical soft with Thin liquids    # Sepsis second to COVID vs Multi-bacterial PNA/ Bacteremia and R Lung Abscess vs CDIFF (+)   - COVID with superimposed multi-bacterial VAP vs aspiration PNA vs cavitary PNA.   - SCx (11/24) MSSA and BCx (11/27) with MSSA and Proteus Bacteremia  - SCx (12/1, 12/12 and 12/27) with Stenotrophomonas.   - SCx (1/9) with  Pseudomonas and Stenotrophomonas   - Bcx has been persistently negative from 12/1, and most recently 1/12   - CT CHEST 12/4 with right lung cavitation.   - s/p multiple antibiotics, but now completed Fortaz and Flagyl (1/12-1/16)  - RPT CT CHEST 1/28 with improvement of right lung abscess   - SCx with  Pseudomonas and Stenotrophomonas and s/p TED/ Fortaz (1/28-2/6)    # CDIFF (+)   - s/p PO Vancomycin 2/1-2/25 with no improvement in diarrhea.   - Cholestyramine attempted, but failed.   - Stool O&P and GI PCR NGTD. Check Stool Cx. c/w Lomotil QID trial for now.   - ID following     # DMII   - Continue on ISS and monitor FS. Adjust insulin as needed.     # Hx of DVT   - c/w Xarelto.     # Skin/ Facial Wounds   - WOC recommendations appreciated   - Plastics evaluation appreciated, pending re-evaluation     # Contracted Wrists   - PT/OT working on strength.     # DVT/ GI PPX with FULL AC/ PPI   # CODE STATUS - FULL CODE   # DISPO - PT recc Rehab/ Vent Facility    59 YO F with PMHx of HTN, PE/DVT on Xarelto, Peritonitis s/p Oral's Procedure and Ileostomy (reversed in 2011) and OHS on BIPAP 16/9 QHS outpatient who presented to [facility] on 11/18 for AHRF second to COVID 19, intubated on 11/23 complicated by R lung abscesses on CT CHEST 12/5, multibacterial PNA and bacteremia, ARTEMIO s/p CRRT and HD, and s/p Tracheostomy 12/18 c/b track leak and s/p OR 8Bivona. Transferred to [facility] for furhter management.     # AMS/ Anxiety   - AOX3 at baseline.   - CTH and EEG WNL   - Anxiety much improved and c/w Klonopin 0.5 mg PO daily.   - Supportive care.     # Hx of OHS  - Saturating well on 2LNC throughout day/night  - Overnight pulse ox on 2L Wednesday night  - ABG Thursday AM to deem necessity of NIV upon discharge given her weight loss.     # ARDS second to COVID vs Multi-bacterial PNA/ R Lung Abscess  - s/p Intubated on 11/23 and c/b multi-bacterial PNA and lung abscesses.   - s/p Tracheostomy on 12/18 by CTSx. Sutures out 1/1  - s/p Air leak noted and s/p Bivona with CTSx 12/31. s/p Downsized to 6DCT 2/25  - s/p Successfully decannulated on 3/1, Now on 2LNC saturating well  - Proventil and Chest PT q6hr PRN    # Vasoplegic vs Septic Shock   - c/w Norvasc and Metoprolol.   - On full AC for Hx of DVT. CTA CHEST with no PEs.     # ARTEMIO s/p CRRT and HD   - ARTEMIO now resolved and no longer required HD.   - Hypokalemia in setting of diarrhea/ CDIFF. Monitor electrolytes.   - Monitor renal function and avoid nephrotoxins.  - Mild hypokalemia on 3/2, s/p PO Kcl 40 x 2, monitor in the morning     # GI   - s/p PEG tube removal on 3/2. no complications  - c/w Mechanical soft with Thin liquids    # Sepsis second to COVID vs Multi-bacterial PNA/ Bacteremia and R Lung Abscess vs CDIFF (+)   - COVID with superimposed multi-bacterial VAP vs aspiration PNA vs cavitary PNA.   - SCx (11/24) MSSA and BCx (11/27) with MSSA and Proteus Bacteremia  - SCx (12/1, 12/12 and 12/27) with Stenotrophomonas.   - SCx (1/9) with  Pseudomonas and Stenotrophomonas   - Bcx has been persistently negative from 12/1, and most recently 1/12   - CT CHEST 12/4 with right lung cavitation.   - s/p multiple antibiotics, but now completed Fortaz and Flagyl (1/12-1/16)  - RPT CT CHEST 1/28 with improvement of right lung abscess   - SCx with  Pseudomonas and Stenotrophomonas and s/p TED/ Fortaz (1/28-2/6)    # CDIFF (+)   - s/p PO Vancomycin 2/1-2/25 with no improvement in diarrhea.   - Cholestyramine attempted, but failed.   - Stool O&P and GI PCR NGTD. Check Stool Cx. c/w Lomotil QID for now.     # DMII   - Continue on ISS and monitor FS. Adjust insulin as needed.     # Hx of DVT   - c/w Xarelto.     # Skin/ Facial Wounds   - WOC recommendations appreciated   - Plastics evaluation appreciated, pending re-evaluation     # Contracted Wrists   - PT/OT working on strength.     # DVT/ GI PPX with FULL AC/ PPI   # CODE STATUS - FULL CODE   # DISPO - PT recc Rehab/ Vent Facility

## 2021-03-02 NOTE — PROVIDER CONTACT NOTE (OTHER) - ACTION/TREATMENT ORDERED:
Provider is notified. Ordered to hold the feedings. Continue to monitor.
take rectal temp and notify provider
Provider notified. Check bladder scan for residual urine.
Provider said to hold the medication as PEG is clogged. Continue to monitor.
continue to monitor patient FS
provider aware, will continue to monitor, lovenox to be held
Hold NPH  continue tube feeds  continue to monitor patient for hypoglycemia
Provider is notified. Ordered to hold amlodipine and metoprolol. Continue to monitor.
give next dose of tylenol, cold packs
Provider aware, told to hold 1400 dose of valium and methadone.
Provider aware. Hold 1800 dose of metoprolol and watch trach site for bleeding.
Will order IV tylenol
no further actions needed at this time
place pt on ice packs and hyperthermia blanket/cooling blanket. Make VS q4
recheck FS before increasing tube feed rate at 0200 and notify provider
tylenol order
Provider notified. PRN tylenol given, blood cultures ordered, EKG ordered
follow orders
keep site open, will assess
Provider notified, give lopressor IVP
Will f/u with trach exchange
do manual BP
follow orders
hold insulin  Give patient juice through PEG   recheck FS
no further actions required at this time
provider aware, awaiting order viokase to help unclog the peg tube
tylenol 1000 mg iv given as ordered
Provider is notified. Continue to use hypothermia blanket and ice packs. Continue to monitor.
Provider is notified. Ordered to give PRN acetaminophen. Continue to monitor.
Provider is notified. Ordered to give PRN acetaminophen. Continue to monitor.

## 2021-03-02 NOTE — PROVIDER CONTACT NOTE (OTHER) - RECOMMENDATIONS
Notify provider
contact provider
Notify the provider.
contact provider
contact proviser
notify provider
Rectal Temp
tube feed increased to 30ml/hr
tylenol order
Notify the provider.
Notify the provider.
contact provider
place pt on ice packs and hyperthermia blanket/cooling blanket. Make VS q4
notify provider
trach exchange
tylenol order
PRN Tylenol order
Tylenol 1000 mg iv
Clarify with provider.
Notify provider.
contact provider
contact provider
notify provider
notify provider
peg tube site kept open tape to abdomen
tylenol order
Notify the provider.
contact provider

## 2021-03-02 NOTE — PROVIDER CONTACT NOTE (OTHER) - REASON
FS 87
increase in Temperature 103.6
temp 101.3F
BP is 129/60 and HR is 108. Patient's PEG is clogged. Okay to hold BP medications?
Pt temp 100.4, tachypneic 28
Pt temp 102.1, , RR 36
RR ranging 30-40s appears restless
NG tube clogged/displaced
Peg tube clogged
FS 79
blood in sputum when suctioning through trach
dislodged peg
temp 101.8F
Temperature is 100.3F axillary.
Temperature is 100.6F axillary. HR is 105, BP is 137/57 electronically and RR is 22.
oral temp 100.1F
/70
100.1 temp
Axillary temperature is 101.6F.
FS 89
Pt /62 after returning from trach exchange.
Pt going for trach exchange. Methadone and Valium due at 1400.
PEG is clogged. Should amlodipine and metoprolol be held?
temp 101F
/91
FS 93
FS 98
PEG tube is clogged.
Pt is not pulling TV on Vent
pt axillary temperature is 100+ F

## 2021-03-02 NOTE — PROGRESS NOTE ADULT - NUTRITIONAL ASSESSMENT
This patient has been assessed with a concern for Malnutrition and has been determined to have a diagnosis/diagnoses of Severe protein-calorie malnutrition and Morbid obesity (BMI > 40).    This patient is being managed with:   Diet Dysphagia 2 Mechanical Soft-Thin Liquids-  Entered: Mar  1 2021  1:46PM

## 2021-03-02 NOTE — PROGRESS NOTE ADULT - SUBJECTIVE AND OBJECTIVE BOX
Chief Complaint:  Patient is a 60y old  Female who presents with a chief complaint of Transfer from [facility] (02 Mar 2021 08:54)      Interval Events:   patient is tolerating good oral intake  Allergies:  Kiwi (Unknown)  latex (Anaphylaxis)  latex (Unknown)  penicillin (Other)  penicillin (Unknown)  perfume  hives (Other)  potassium acetate (Other)  soap additives hives (Other)      Hospital Medications:  acetaminophen    Suspension .. 650 milliGRAM(s) Oral every 6 hours PRN  ALBUTerol    90 MICROgram(s) HFA Inhaler 2 Puff(s) Inhalation every 6 hours  amLODIPine   Tablet 5 milliGRAM(s) Oral daily  artificial tears (preservative free) Ophthalmic Solution 1 Drop(s) Both EYES every 8 hours PRN  chlorhexidine 4% Liquid 1 Application(s) Topical daily  cholestyramine Powder (Sugar-Free) 4 Gram(s) Oral two times a day  clonazePAM  Tablet 0.5 milliGRAM(s) Oral daily  collagenase Ointment 1 Application(s) Topical two times a day  dextrose 40% Gel 15 Gram(s) Oral once  dextrose 5%. 1000 milliLiter(s) IV Continuous <Continuous>  dextrose 5%. 1000 milliLiter(s) IV Continuous <Continuous>  dextrose 5%. 1000 milliLiter(s) IV Continuous <Continuous>  dextrose 50% Injectable 25 Gram(s) IV Push once  dextrose 50% Injectable 12.5 Gram(s) IV Push once  dextrose 50% Injectable 25 Gram(s) IV Push once  diphenoxylate/atropine 1 Tablet(s) Oral four times a day  glucagon  Injectable 1 milliGRAM(s) IntraMuscular once  guaiFENesin   Syrup  (Sugar-Free) 100 milliGRAM(s) Oral every 6 hours PRN  insulin lispro (ADMELOG) corrective regimen sliding scale   SubCutaneous every 6 hours  lactobacillus acidophilus 1 Tablet(s) Oral two times a day  metoprolol tartrate 50 milliGRAM(s) Oral two times a day  multivitamin 1 Tablet(s) Oral daily  nystatin Powder 1 Application(s) Topical every 8 hours  oxyCODONE    Solution 5 milliGRAM(s) Oral every 4 hours PRN  pantoprazole  Injectable 40 milliGRAM(s) IV Push daily  psyllium Powder 1 Packet(s) Oral two times a day  rivaroxaban 20 milliGRAM(s) Oral with dinner  sodium bicarbonate 650 milliGRAM(s) Oral once  Viokase 10,440 units 1 Tablet(s) 1 Tablet(s) Enteral Tube once  zinc sulfate 220 milliGRAM(s) Oral daily      PMHX/PSHX:  Pneumomediastinum    Umbilical hernia    Osteoarthritis of both knees, unspecified osteoarthritis type    AARON (Obstructive Sleep Apnea)    Pneumonia    S/P Colon Resection    pt with allergies to perfumes and addidtive to soaps, requires hypoallergenic sheets and gowns, line    Cervical Dysplasia    Latex Sensitivity    GERD (Gastroesophageal Reflux Disease)    Asthma    Anemia    S/P Joint Replacement    DVT (Deep Venous Thrombosis)    HTN (Hypertension)    H/O ileostomy    S/P LEEP    S/P Exploratory Laparotomy    S/P Colonoscopy    S/P Endoscopy    S/P  Section    History of Tubal Ligation    S/P Knee Surgery    Umbilical Hernia    Osteoarthritis of Knee    HTN (Hypertension)        Family history:  Family history of gastric cancer    Family history of breast cancer (Grandparent)        ROS:     General:  No wt loss, fevers, chills, night sweats, fatigue,   Eyes:  Good vision, no reported pain  ENT:  No sore throat, pain, runny nose, dysphagia  CV:  No pain, palpitations, hypo/hypertension  Resp:  No dyspnea, cough, tachypnea, wheezing  GI:  See HPI  :  No pain, bleeding, incontinence, nocturia  Muscle:  No pain, weakness  Neuro:  No weakness, tingling, memory problems  Psych:  No fatigue, insomnia, mood problems, depression  Endocrine:  No polyuria, polydipsia, cold/heat intolerance  Heme:  No petechiae, ecchymosis, easy bruisability  Skin:  No rash, edema      PHYSICAL EXAM:     GENERAL:  Appears stated age, well-groomed, well-nourished, no distress  HEENT:  NC/AT,  conjunctivae clear, sclera-anicteric  NECK: Trachea midline, supple  CHEST:  Full & symmetric excursion, no increased effort, breath sounds clear  HEART:  Regular rhythm, no lola/heave  ABDOMEN:  Soft, non-tender, non-distended, normoactive bowel sounds,  no masses ,no hepato-splenomegaly, gastrostomy  EXTREMITIES:  no cyanosis,clubbing or edema, contracted  SKIN:  No rash/erythema/petechiae, no jaundice  NEURO:  Alert, oriented, no asterixis, bedbound  RECTAL: Deferred    Vital Signs:  Vital Signs Last 24 Hrs  T(C): 36 (02 Mar 2021 17:47), Max: 37.5 (01 Mar 2021 22:23)  T(F): 96.8 (02 Mar 2021 17:47), Max: 99.5 (01 Mar 2021 22:23)  HR: 98 (02 Mar 2021 17:47) (80 - 99)  BP: 142/60 (02 Mar 2021 17:47) (142/60 - 148/85)  BP(mean): --  RR: 19 (02 Mar 2021 17:47) (18 - 19)  SpO2: 98% (02 Mar 2021 17:47) (97% - 100%)  Daily     Daily Weight in k (02 Mar 2021 11:25)    LABS:                        11.4   10.38 )-----------( 456      ( 02 Mar 2021 07:30 )             39.7     03-02    137  |  99  |  6<L>  ----------------------------<  121<H>  3.3<L>   |  25  |  0.37<L>    Ca    9.9      02 Mar 2021 07:30  Phos  4.6     03-02  Mg     1.7     03-02          Urinalysis Basic - ( 01 Mar 2021 18:31 )    Color: Yellow / Appearance: Slightly Turbid / S.010 / pH: x  Gluc: x / Ketone: Negative  / Bili: Negative / Urobili: <2 mg/dL   Blood: x / Protein: Trace / Nitrite: Negative   Leuk Esterase: Negative / RBC: 1 /HPF / WBC 6 /HPF   Sq Epi: x / Non Sq Epi: 2 /HPF / Bacteria: Few          Imaging:

## 2021-03-02 NOTE — PROGRESS NOTE ADULT - ATTENDING COMMENTS
agree with above  decannulated, doing well  continue nasal cannula at night for now  will plan for overnight pulse 0x on 2l tomorrow night, and an ABG thursday morning  pt had a prior h/o ishan/ohs, but has lost a lot of weight in the hospital over several months  peg fell out -- she is a diet and eating well.  d/c planning  PMR pablo

## 2021-03-02 NOTE — PROVIDER CONTACT NOTE (OTHER) - ASSESSMENT
100.1 axillary temp   
FS 98
Pt /62 after returning from trach exchange. Metoprolol due at 1800.
Pt going for trach exchange. Methadone and Valium due at 1400. Hold dose for OR procedure.
Temp 100.1F orally, , other VS stable
Temp 101F, all other vitals stable. On vent AC 30%, tolerating well. Rivera/flexi in place.
Temperature is 100.3F axillary. HR is 109, BP is 134/59. RR is 28.
pt in no acute distress
NG tube clogged/displaced, unable to give 6pm medications.
PEG is clogged. BP is 146/74, HR is 99.
Temp 101.8F, all other vitals stable. On vent AC 30%, tolerating well. Rivera/flexi in place.
oral temp 102.1, , /66, RR 36, SpO2 96%
pt in no acute distress
Axillary temperature is 101.6F. HR is 105, BP is 126/57. RR is 28. PRN acetaminophen is due at 16:33.
PEG tube is clogged. Tried Viokase with sodium bicarbonate, warm water, and ginger ale but unsuccessful.
Temperature is 100.6F axillary. HR is 105, BP is 137/57 electronically and RR is 22.
/91  HR 79  pt denies any pain or discomfort
Temp 101.8F, all other vitals stable. On vent AC 30%, tolerating well. Rivera/flexi in place.
blood in sputum when suctioning through trach
no major active bleeding
pt in no acute distress  FS 79
temperature 103.6
A/O x0, on Vent AC not pulling TV. Rivera and flexi in place. Vitals stable.
Peg tube clogged
RR ranging 30-40s appears restless, pt denies pain, pt afebrile. Other VS stable.
pt axillary temperature is 100+ F
pt in no acute distress
Alert, able to nod yes/no. Pt temp 100.4, tachypneic 28, all other vitals stable.
BP is 129/60 and HR is 108. Patient's PEG is clogged.
pt in no acute distress

## 2021-03-02 NOTE — PROGRESS NOTE ADULT - SUBJECTIVE AND OBJECTIVE BOX
CHIEF COMPLAINT: Patient is a 60y old  Female who presents with a chief complaint of Transfer from [facility] (01 Mar 2021 17:23)    Interval Events:     REVIEW OF SYSTEMS:  Constitutional:   Eyes:  ENT:  CV:  Resp:  GI:  :  MSK:  Integumentary:  Neurological:  Psychiatric:  Endocrine:  Hematologic/Lymphatic:  Allergic/Immunologic:  [ ] All other systems negative  [ ] Unable to assess ROS because ________    OBJECTIVE:  ICU Vital Signs Last 24 Hrs  T(C): 37.2 (02 Mar 2021 06:24), Max: 38.3 (01 Mar 2021 17:45)  T(F): 98.9 (02 Mar 2021 06:24), Max: 101 (01 Mar 2021 17:45)  HR: 80 (02 Mar 2021 07:40) (80 - 110)  BP: 148/85 (02 Mar 2021 06:24) (146/73 - 150/69)  BP(mean): --  ABP: --  ABP(mean): --  RR: 18 (02 Mar 2021 06:24) (18 - 19)  SpO2: 98% (02 Mar 2021 07:40) (94% - 100%)    Mode: standby    - @ 07:01  -  03-02 @ 07:00  --------------------------------------------------------  IN: 0 mL / OUT: 900 mL / NET: -900 mL      CAPILLARY BLOOD GLUCOSE      POCT Blood Glucose.: 130 mg/dL (02 Mar 2021 05:06)    PHYSICAL EXAM  GENERAL: Morbidly obese, NAD  NECK: Supple, No JVD, Tracheostomy stoma covered with dressing   LUNG: + Mildly coarse to b/l Upper bases, Normal respiratory effort.  HEART: +s1, s2  ABDOMEN: +BS, soft, nt/nd, obese abdomen, no peritoneals signs noted, +PEG, site c/d/i.   EXTREMITIES: +2 pitting edema b/l lower ext.  NEUROLOGY: A&0x3, non-focal, no neuro deficits   SKIN: as per AAI sheet RN monitoring      HOSPITAL MEDICATIONS:  MEDICATIONS  (STANDING):  ALBUTerol    90 MICROgram(s) HFA Inhaler 2 Puff(s) Inhalation every 6 hours  amLODIPine   Tablet 5 milliGRAM(s) Oral daily  chlorhexidine 0.12% Liquid 15 milliLiter(s) Oral Mucosa every 12 hours  chlorhexidine 4% Liquid 1 Application(s) Topical daily  cholestyramine Powder (Sugar-Free) 4 Gram(s) Oral two times a day  clonazePAM  Tablet 0.5 milliGRAM(s) Oral daily  collagenase Ointment 1 Application(s) Topical two times a day  dextrose 40% Gel 15 Gram(s) Oral once  dextrose 5%. 1000 milliLiter(s) (50 mL/Hr) IV Continuous <Continuous>  dextrose 5%. 1000 milliLiter(s) (100 mL/Hr) IV Continuous <Continuous>  dextrose 5%. 1000 milliLiter(s) (75 mL/Hr) IV Continuous <Continuous>  dextrose 50% Injectable 25 Gram(s) IV Push once  dextrose 50% Injectable 12.5 Gram(s) IV Push once  dextrose 50% Injectable 25 Gram(s) IV Push once  diphenoxylate/atropine 1 Tablet(s) Oral four times a day  glucagon  Injectable 1 milliGRAM(s) IntraMuscular once  insulin lispro (ADMELOG) corrective regimen sliding scale   SubCutaneous every 6 hours  lactobacillus acidophilus 1 Tablet(s) Oral two times a day  metoprolol tartrate 50 milliGRAM(s) Oral two times a day  multivitamin 1 Tablet(s) Oral daily  nystatin Powder 1 Application(s) Topical every 8 hours  pantoprazole  Injectable 40 milliGRAM(s) IV Push daily  potassium chloride    Tablet ER 40 milliEquivalent(s) Oral every 4 hours  psyllium Powder 1 Packet(s) Oral two times a day  rivaroxaban 20 milliGRAM(s) Oral with dinner  sodium bicarbonate 650 milliGRAM(s) Oral once  Viokase 10,440 units 1 Tablet(s) 1 Tablet(s) Enteral Tube once  zinc sulfate 220 milliGRAM(s) Oral daily    MEDICATIONS  (PRN):  acetaminophen    Suspension .. 650 milliGRAM(s) Oral every 6 hours PRN Temp greater or equal to 38C (100.4F), Mild Pain (1 - 3), Moderate Pain (4 - 6)  artificial tears (preservative free) Ophthalmic Solution 1 Drop(s) Both EYES every 8 hours PRN Dry Eyes  guaiFENesin   Syrup  (Sugar-Free) 100 milliGRAM(s) Oral every 6 hours PRN Cough  oxyCODONE    Solution 5 milliGRAM(s) Oral every 4 hours PRN Severe Pain (7 - 10)      LABS:                        11.4   10.38 )-----------( 456      ( 02 Mar 2021 07:30 )             39.7     03-02    137  |  99  |  6<L>  ----------------------------<  121<H>  3.3<L>   |  25  |  0.37<L>    Ca    9.9      02 Mar 2021 07:30  Phos  4.6     03-02  Mg     1.7     03-02        Urinalysis Basic - ( 01 Mar 2021 18:31 )    Color: Yellow / Appearance: Slightly Turbid / S.010 / pH: x  Gluc: x / Ketone: Negative  / Bili: Negative / Urobili: <2 mg/dL   Blood: x / Protein: Trace / Nitrite: Negative   Leuk Esterase: Negative / RBC: 1 /HPF / WBC 6 /HPF   Sq Epi: x / Non Sq Epi: 2 /HPF / Bacteria: Few            MICROBIOLOGY:     RADIOLOGY:  [ ] Reviewed and interpreted by me    PULMONARY FUNCTION TESTS:    EKG: CHIEF COMPLAINT: Patient is a 60y old  Female who presents with a chief complaint of Transfer from [facility] (01 Mar 2021 17:23)    Interval Events: None reported overnight. Pt reports feeling well. No new complaints. Tolerating PO diet well. PEG tube removal today at bedside by JACQUELINE DILL, saturating well on 2LNC. PM&R eval pending.     REVIEW OF SYSTEMS:  see above  [x] All other systems negative      OBJECTIVE:  ICU Vital Signs Last 24 Hrs  T(C): 37.2 (02 Mar 2021 06:24), Max: 38.3 (01 Mar 2021 17:45)  T(F): 98.9 (02 Mar 2021 06:24), Max: 101 (01 Mar 2021 17:45)  HR: 80 (02 Mar 2021 07:40) (80 - 110)  BP: 148/85 (02 Mar 2021 06:24) (146/73 - 150/69)  BP(mean): --  ABP: --  ABP(mean): --  RR: 18 (02 Mar 2021 06:24) (18 - 19)  SpO2: 98% (02 Mar 2021 07:40) (94% - 100%)    Mode: standby    - @ 07:01  -  03-02 @ 07:00  --------------------------------------------------------  IN: 0 mL / OUT: 900 mL / NET: -900 mL      CAPILLARY BLOOD GLUCOSE      POCT Blood Glucose.: 130 mg/dL (02 Mar 2021 05:06)    PHYSICAL EXAM  GENERAL: Morbidly obese, NAD  NECK: Supple, No JVD, Tracheostomy stoma covered with dressing   LUNG: + Mildly coarse to b/l Upper bases, Normal respiratory effort.  HEART: +s1, s2  ABDOMEN: +BS, soft, nt/nd, obese abdomen, no peritoneals signs noted, s/p PEG removal, stoma covered with Dressing  EXTREMITIES: +2 pitting edema b/l lower ext.  NEUROLOGY: A&0x3, non-focal, no neuro deficits   SKIN: as per AAI sheet RN monitoring      HOSPITAL MEDICATIONS:  MEDICATIONS  (STANDING):  ALBUTerol    90 MICROgram(s) HFA Inhaler 2 Puff(s) Inhalation every 6 hours  amLODIPine   Tablet 5 milliGRAM(s) Oral daily  chlorhexidine 0.12% Liquid 15 milliLiter(s) Oral Mucosa every 12 hours  chlorhexidine 4% Liquid 1 Application(s) Topical daily  cholestyramine Powder (Sugar-Free) 4 Gram(s) Oral two times a day  clonazePAM  Tablet 0.5 milliGRAM(s) Oral daily  collagenase Ointment 1 Application(s) Topical two times a day  dextrose 40% Gel 15 Gram(s) Oral once  dextrose 5%. 1000 milliLiter(s) (50 mL/Hr) IV Continuous <Continuous>  dextrose 5%. 1000 milliLiter(s) (100 mL/Hr) IV Continuous <Continuous>  dextrose 5%. 1000 milliLiter(s) (75 mL/Hr) IV Continuous <Continuous>  dextrose 50% Injectable 25 Gram(s) IV Push once  dextrose 50% Injectable 12.5 Gram(s) IV Push once  dextrose 50% Injectable 25 Gram(s) IV Push once  diphenoxylate/atropine 1 Tablet(s) Oral four times a day  glucagon  Injectable 1 milliGRAM(s) IntraMuscular once  insulin lispro (ADMELOG) corrective regimen sliding scale   SubCutaneous every 6 hours  lactobacillus acidophilus 1 Tablet(s) Oral two times a day  metoprolol tartrate 50 milliGRAM(s) Oral two times a day  multivitamin 1 Tablet(s) Oral daily  nystatin Powder 1 Application(s) Topical every 8 hours  pantoprazole  Injectable 40 milliGRAM(s) IV Push daily  potassium chloride    Tablet ER 40 milliEquivalent(s) Oral every 4 hours  psyllium Powder 1 Packet(s) Oral two times a day  rivaroxaban 20 milliGRAM(s) Oral with dinner  sodium bicarbonate 650 milliGRAM(s) Oral once  Viokase 10,440 units 1 Tablet(s) 1 Tablet(s) Enteral Tube once  zinc sulfate 220 milliGRAM(s) Oral daily    MEDICATIONS  (PRN):  acetaminophen    Suspension .. 650 milliGRAM(s) Oral every 6 hours PRN Temp greater or equal to 38C (100.4F), Mild Pain (1 - 3), Moderate Pain (4 - 6)  artificial tears (preservative free) Ophthalmic Solution 1 Drop(s) Both EYES every 8 hours PRN Dry Eyes  guaiFENesin   Syrup  (Sugar-Free) 100 milliGRAM(s) Oral every 6 hours PRN Cough  oxyCODONE    Solution 5 milliGRAM(s) Oral every 4 hours PRN Severe Pain (7 - 10)      LABS:                        11.4   10.38 )-----------( 456      ( 02 Mar 2021 07:30 )             39.7     03-02    137  |  99  |  6<L>  ----------------------------<  121<H>  3.3<L>   |  25  |  0.37<L>    Ca    9.9      02 Mar 2021 07:30  Phos  4.6     03-02  Mg     1.7     03-02        Urinalysis Basic - ( 01 Mar 2021 18:31 )    Color: Yellow / Appearance: Slightly Turbid / S.010 / pH: x  Gluc: x / Ketone: Negative  / Bili: Negative / Urobili: <2 mg/dL   Blood: x / Protein: Trace / Nitrite: Negative   Leuk Esterase: Negative / RBC: 1 /HPF / WBC 6 /HPF   Sq Epi: x / Non Sq Epi: 2 /HPF / Bacteria: Few            MICROBIOLOGY:     RADIOLOGY:  [ ] Reviewed and interpreted by me    PULMONARY FUNCTION TESTS:    EKG:

## 2021-03-02 NOTE — PROGRESS NOTE ADULT - ASSESSMENT
60 year old female with prolonged hospital course secondary to COVID 19, GI consulted for persistent diarrhea     Problem/Recommendation - 1:  Problem: COVID-19. Recommendation: status post treatment by infectious disease  now off of isolation.     Problem/Recommendation - 2:  ·  Problem: Respiratory failure.  Recommendation: status post tracheostomy, now downsized, on trach collar.      Problem/Recommendation - 3:  ·  Problem: Diarrhea.  Recommendation: seems somewhat improved on current regimen, consistency also somewhat more solid. Added metamucil to bulk up stool. Stool output improved. Continue lomotil as well.     Problem/Recommendation - 4:  ·  Problem: Hernia, incisional.  Recommendation: reducible, nontender. No signs of incarceration. Randi has hx of bowel resection, but she cannot provide the exact circumstances.  -serial abdominal exams.      Problem/Recommendation - 5:  ·  Problem: Gastrostomy present.  Recommendation: patient tolerating good PO intake. Passed swallow eval. Gastrostomy removed at bedside and bandaged.     Problem/Recommendation - 6:  Problem: ARTEMIO (acute kidney injury). Recommendation: s/p CVVHD, now improved.     Problem/Recommendation - 7:  Problem: History of pressure ulcer. Recommendation: per RCU team.     Problem/Recommendation - 8:  Problem: Clostridium difficile infection. Recommendation: status post 2 weeks of PO vanco  -vanco per ID on hold.    Attending Attestation:   Differential diagnosis and plan of care discussed with patient after the evaluation  75 Minutes spent on total encounter of which more than fifty percent of the encounter was spent counseling and/or coordinating care by the attending physician.      Ranjeet Ponce M.D.   Gastroenterology and Hepatology  Cell: [phone].

## 2021-03-02 NOTE — PROVIDER CONTACT NOTE (OTHER) - DATE AND TIME:
24-Jan-2021 22:50
28-Feb-2021 05:20
30-Dec-2020 08:00
01-Mar-2021 23:00
18-Jan-2021 02:15
18-Jan-2021 23:05
27-Feb-2021 18:14
29-Dec-2020 05:40
30-Dec-2020 12:22
01-Jan-2021 17:08
12-Jan-2021 06:55
13-Dec-2020 00:00
18-Jan-2021 01:05
30-Dec-2020 18:04
30-Jan-2021 04:56
31-Dec-2020 13:42
31-Dec-2020 18:05
09-Jan-2021 18:00
01-Jan-2021 13:40
29-Dec-2020 01:45
30-Dec-2020 14:38
10-Camilo-2021 18:45
18-Jan-2021 22:22
29-Dec-2020 11:47
01-Jan-2021 10:25
27-Jan-2021 02:47
29-Dec-2020 06:20
30-Dec-2020 21:45
30-Jan-2021 00:34

## 2021-03-03 LAB
ANION GAP SERPL CALC-SCNC: 12 MMOL/L — SIGNIFICANT CHANGE UP (ref 7–14)
BUN SERPL-MCNC: 8 MG/DL — SIGNIFICANT CHANGE UP (ref 7–23)
CALCIUM SERPL-MCNC: 10.1 MG/DL — SIGNIFICANT CHANGE UP (ref 8.4–10.5)
CHLORIDE SERPL-SCNC: 103 MMOL/L — SIGNIFICANT CHANGE UP (ref 98–107)
CO2 SERPL-SCNC: 23 MMOL/L — SIGNIFICANT CHANGE UP (ref 22–31)
CREAT SERPL-MCNC: 0.41 MG/DL — LOW (ref 0.5–1.3)
GAS PNL BLDV: 136 MMOL/L — SIGNIFICANT CHANGE UP (ref 136–146)
GLUCOSE BLDC GLUCOMTR-MCNC: 113 MG/DL — HIGH (ref 70–99)
GLUCOSE BLDC GLUCOMTR-MCNC: 124 MG/DL — HIGH (ref 70–99)
GLUCOSE BLDC GLUCOMTR-MCNC: 129 MG/DL — HIGH (ref 70–99)
GLUCOSE BLDC GLUCOMTR-MCNC: 136 MG/DL — HIGH (ref 70–99)
GLUCOSE SERPL-MCNC: 123 MG/DL — HIGH (ref 70–99)
HCT VFR BLD CALC: 37.5 % — SIGNIFICANT CHANGE UP (ref 34.5–45)
HGB BLD-MCNC: 10.9 G/DL — LOW (ref 11.5–15.5)
HOROWITZ INDEX BLDV+IHG-RTO: SIGNIFICANT CHANGE UP
MAGNESIUM SERPL-MCNC: 1.9 MG/DL — SIGNIFICANT CHANGE UP (ref 1.6–2.6)
MCHC RBC-ENTMCNC: 27 PG — SIGNIFICANT CHANGE UP (ref 27–34)
MCHC RBC-ENTMCNC: 29.1 GM/DL — LOW (ref 32–36)
MCV RBC AUTO: 92.8 FL — SIGNIFICANT CHANGE UP (ref 80–100)
NRBC # BLD: 0 /100 WBCS — SIGNIFICANT CHANGE UP
NRBC # FLD: 0 K/UL — SIGNIFICANT CHANGE UP
PHOSPHATE SERPL-MCNC: 3.8 MG/DL — SIGNIFICANT CHANGE UP (ref 2.5–4.5)
PLATELET # BLD AUTO: 479 K/UL — HIGH (ref 150–400)
POTASSIUM BLDV-SCNC: 4.4 MMOL/L — SIGNIFICANT CHANGE UP (ref 3.4–4.5)
POTASSIUM SERPL-MCNC: 4.4 MMOL/L — SIGNIFICANT CHANGE UP (ref 3.5–5.3)
POTASSIUM SERPL-SCNC: 4.4 MMOL/L — SIGNIFICANT CHANGE UP (ref 3.5–5.3)
RBC # BLD: 4.04 M/UL — SIGNIFICANT CHANGE UP (ref 3.8–5.2)
RBC # FLD: 15.8 % — HIGH (ref 10.3–14.5)
SARS-COV-2 RNA SPEC QL NAA+PROBE: SIGNIFICANT CHANGE UP
SODIUM SERPL-SCNC: 138 MMOL/L — SIGNIFICANT CHANGE UP (ref 135–145)
WBC # BLD: 9.1 K/UL — SIGNIFICANT CHANGE UP (ref 3.8–10.5)
WBC # FLD AUTO: 9.1 K/UL — SIGNIFICANT CHANGE UP (ref 3.8–10.5)

## 2021-03-03 PROCEDURE — 99222 1ST HOSP IP/OBS MODERATE 55: CPT

## 2021-03-03 PROCEDURE — 99233 SBSQ HOSP IP/OBS HIGH 50: CPT

## 2021-03-03 PROCEDURE — 99232 SBSQ HOSP IP/OBS MODERATE 35: CPT

## 2021-03-03 RX ORDER — INSULIN LISPRO 100/ML
VIAL (ML) SUBCUTANEOUS
Refills: 0 | Status: DISCONTINUED | OUTPATIENT
Start: 2021-03-03 | End: 2021-03-19

## 2021-03-03 RX ADMIN — RIVAROXABAN 20 MILLIGRAM(S): KIT at 18:34

## 2021-03-03 RX ADMIN — CHOLESTYRAMINE 4 GRAM(S): 4 POWDER, FOR SUSPENSION ORAL at 09:51

## 2021-03-03 RX ADMIN — ZINC SULFATE TAB 220 MG (50 MG ZINC EQUIVALENT) 220 MILLIGRAM(S): 220 (50 ZN) TAB at 13:22

## 2021-03-03 RX ADMIN — Medication 1 TABLET(S): at 13:22

## 2021-03-03 RX ADMIN — Medication 1 APPLICATION(S): at 05:43

## 2021-03-03 RX ADMIN — Medication 1 TABLET(S): at 18:34

## 2021-03-03 RX ADMIN — ALBUTEROL 2 PUFF(S): 90 AEROSOL, METERED ORAL at 23:00

## 2021-03-03 RX ADMIN — NYSTATIN CREAM 1 APPLICATION(S): 100000 CREAM TOPICAL at 05:42

## 2021-03-03 RX ADMIN — Medication 1 TABLET(S): at 18:42

## 2021-03-03 RX ADMIN — PANTOPRAZOLE SODIUM 40 MILLIGRAM(S): 20 TABLET, DELAYED RELEASE ORAL at 13:23

## 2021-03-03 RX ADMIN — Medication 1 TABLET(S): at 05:44

## 2021-03-03 RX ADMIN — CHLORHEXIDINE GLUCONATE 1 APPLICATION(S): 213 SOLUTION TOPICAL at 13:23

## 2021-03-03 RX ADMIN — NYSTATIN CREAM 1 APPLICATION(S): 100000 CREAM TOPICAL at 22:21

## 2021-03-03 RX ADMIN — NYSTATIN CREAM 1 APPLICATION(S): 100000 CREAM TOPICAL at 13:23

## 2021-03-03 RX ADMIN — Medication 1 TABLET(S): at 05:42

## 2021-03-03 RX ADMIN — Medication 1 PACKET(S): at 05:46

## 2021-03-03 RX ADMIN — CHOLESTYRAMINE 4 GRAM(S): 4 POWDER, FOR SUSPENSION ORAL at 22:21

## 2021-03-03 RX ADMIN — Medication 50 MILLIGRAM(S): at 05:45

## 2021-03-03 RX ADMIN — ALBUTEROL 2 PUFF(S): 90 AEROSOL, METERED ORAL at 10:06

## 2021-03-03 RX ADMIN — Medication 1 TABLET(S): at 13:30

## 2021-03-03 RX ADMIN — ALBUTEROL 2 PUFF(S): 90 AEROSOL, METERED ORAL at 03:20

## 2021-03-03 RX ADMIN — AMLODIPINE BESYLATE 5 MILLIGRAM(S): 2.5 TABLET ORAL at 05:44

## 2021-03-03 RX ADMIN — Medication 1 APPLICATION(S): at 18:34

## 2021-03-03 RX ADMIN — Medication 1 PACKET(S): at 18:35

## 2021-03-03 RX ADMIN — Medication 50 MILLIGRAM(S): at 18:34

## 2021-03-03 RX ADMIN — Medication 0.5 MILLIGRAM(S): at 13:30

## 2021-03-03 NOTE — CONSULT NOTE ADULT - SUBJECTIVE AND OBJECTIVE BOX
Patient is a 60y old  Female who presents with a chief complaint of Transfer from [facility] (03 Mar 2021 07:53)      HPI:  Patient is a 59yo F w/ AARON, peritonitis s/p Oral's procedure and ileostomy (reversed ), HTN, prior DVT/PE, ?Asthma admitted on 2020 to [facility] after presenting for  productive cough  w/ SOB x9 days and diarrhea x7 days and fever x1 day. Patient was found to be COVID + on 2020 and completed remdesivir -. Patient was intubated for airway protection on 2020. Patient has required proning (last 2020). Hospital course c/b by ATN, requiring CVVHD now on intermittent HD, unresponsive to bumex challenge last HD . Course also c/b Stenotrophomonas (, completed Levaquin x7 days), MSSA/P. mirablis bacteremia (on Cefazolin, potentially due to urine vs line-associated and MSSA in sputum due to PNA), also with lung abscesses on CT chest on .  Patient currently on Volume AC 30/350/8/35% on Precedex. Patient intermittently opens eyes but is not yet responsive. Transferred over to [facility] on 12/10 to offload [facility] COVID ICU    (10 Dec 2020 14:13)    presented to [facility] on  for AHRF second to COVID 19, intubated on  complicated by R lung abscesses, multibacterial PNA and bacteremia, ARTEMIO s/p CRRT and HD, and s/p Tracheostomy .    On contact precautions for  pseudomonas and c diff.   received HD during admission, no longer needed.   tolerating nasal cannula, decannulated on 3/1.  PEG removed on 3/2.     REVIEW OF SYSTEMS  Constitutional - No fever, No weight loss, + fatigue  HEENT - No eye pain, No visual disturbances, No difficulty hearing, No tinnitus, No vertigo, No neck pain  Respiratory - No cough, No wheezing, No shortness of breath  Cardiovascular - No chest pain, No palpitations  Gastrointestinal - No abdominal pain, No nausea, No vomiting, + diarrhea, No constipation  Genitourinary - No dysuria, No frequency, No hematuria, No incontinence  Neurological - No headaches, No memory loss, + loss of strength, No numbness, No tremors  Skin - No itching, No rashes, No lesions   Endocrine - No temperature intolerance  Musculoskeletal - No joint pain, No joint swelling, No muscle pain  Psychiatric - No depression, No anxiety    PAST MEDICAL & SURGICAL HISTORY  Pneumomediastinum    Umbilical hernia    Osteoarthritis of both knees, unspecified osteoarthritis type    AARON (Obstructive Sleep Apnea)    Pneumonia    S/P Colon Resection    pt with allergies to perfumes and addidtive to soaps, requires hypoallergenic sheets and gowns, line    Cervical Dysplasia    Latex Sensitivity    GERD (Gastroesophageal Reflux Disease)    Asthma    Anemia    S/P Joint Replacement    DVT (Deep Venous Thrombosis)    HTN (Hypertension)    H/O ileostomy    S/P LEEP    S/P Exploratory Laparotomy    S/P Colonoscopy    S/P Endoscopy    S/P  Section    History of Tubal Ligation    S/P Knee Surgery    Umbilical Hernia    Osteoarthritis of Knee    HTN (Hypertension)        SOCIAL HISTORY  Smoking - Denied  EtOH - Denied   Drugs - Denied    FUNCTIONAL HISTORY  Lives   Independent    CURRENT FUNCTIONAL STATUS  3/2 Pt required maximum assist x2 to sit at edge of bed. Pt tolerated sitting for ~4 minutes and requested to lay back down due to increased fatigue.     FAMILY HISTORY   Family history of gastric cancer  Family history of breast cancer (Grandparent)      RECENT LABS/IMAGING  CBC Full  -  ( 03 Mar 2021 07:02 )  WBC Count : 9.10 K/uL  RBC Count : 4.04 M/uL  Hemoglobin : 10.9 g/dL  Hematocrit : 37.5 %  Platelet Count - Automated : 479 K/uL  Mean Cell Volume : 92.8 fL  Mean Cell Hemoglobin : 27.0 pg  Mean Cell Hemoglobin Concentration : 29.1 gm/dL  Auto Neutrophil # : x  Auto Lymphocyte # : x  Auto Monocyte # : x  Auto Eosinophil # : x  Auto Basophil # : x  Auto Neutrophil % : x  Auto Lymphocyte % : x  Auto Monocyte % : x  Auto Eosinophil % : x  Auto Basophil % : x        138  |  103  |  8   ----------------------------<  123<H>  4.4   |  23  |  0.41<L>    Ca    10.1      03 Mar 2021 07:12  Phos  3.8     -  Mg     1.9     -03      Urinalysis Basic - ( 01 Mar 2021 18:31 )    Color: Yellow / Appearance: Slightly Turbid / S.010 / pH: x  Gluc: x / Ketone: Negative  / Bili: Negative / Urobili: <2 mg/dL   Blood: x / Protein: Trace / Nitrite: Negative   Leuk Esterase: Negative / RBC: 1 /HPF / WBC 6 /HPF   Sq Epi: x / Non Sq Epi: 2 /HPF / Bacteria: Few        VITALS  T(C): 36.8 (21 @ 05:40), Max: 36.8 (21 @ 05:40)  HR: 88 (21 @ 05:40) (83 - 98)  BP: 156/61 (21 @ 05:40) (133/66 - 156/61)  RR: 18 (21 @ 05:40) (18 - 19)  SpO2: 98% (21 @ 05:40) (97% - 99%)  Wt(kg): --    ALLERGIES  Kiwi (Unknown)  latex (Anaphylaxis)  latex (Unknown)  penicillin (Other)  penicillin (Unknown)  perfume  hives (Other)  potassium acetate (Other)  soap additives hives (Other)      MEDICATIONS   acetaminophen    Suspension .. 650 milliGRAM(s) Oral every 6 hours PRN  ALBUTerol    90 MICROgram(s) HFA Inhaler 2 Puff(s) Inhalation every 6 hours  amLODIPine   Tablet 5 milliGRAM(s) Oral daily  artificial tears (preservative free) Ophthalmic Solution 1 Drop(s) Both EYES every 8 hours PRN  chlorhexidine 4% Liquid 1 Application(s) Topical daily  cholestyramine Powder (Sugar-Free) 4 Gram(s) Oral two times a day  clonazePAM  Tablet 0.5 milliGRAM(s) Oral daily  collagenase Ointment 1 Application(s) Topical two times a day  dextrose 40% Gel 15 Gram(s) Oral once  dextrose 5%. 1000 milliLiter(s) IV Continuous <Continuous>  dextrose 5%. 1000 milliLiter(s) IV Continuous <Continuous>  dextrose 5%. 1000 milliLiter(s) IV Continuous <Continuous>  dextrose 50% Injectable 25 Gram(s) IV Push once  dextrose 50% Injectable 12.5 Gram(s) IV Push once  dextrose 50% Injectable 25 Gram(s) IV Push once  diphenoxylate/atropine 1 Tablet(s) Oral four times a day  glucagon  Injectable 1 milliGRAM(s) IntraMuscular once  guaiFENesin   Syrup  (Sugar-Free) 100 milliGRAM(s) Oral every 6 hours PRN  insulin lispro (ADMELOG) corrective regimen sliding scale   SubCutaneous every 6 hours  lactobacillus acidophilus 1 Tablet(s) Oral two times a day  metoprolol tartrate 50 milliGRAM(s) Oral two times a day  multivitamin 1 Tablet(s) Oral daily  nystatin Powder 1 Application(s) Topical every 8 hours  oxyCODONE    Solution 5 milliGRAM(s) Oral every 4 hours PRN  pantoprazole  Injectable 40 milliGRAM(s) IV Push daily  psyllium Powder 1 Packet(s) Oral two times a day  rivaroxaban 20 milliGRAM(s) Oral with dinner  sodium bicarbonate 650 milliGRAM(s) Oral once  Viokase 10,440 units 1 Tablet(s) 1 Tablet(s) Enteral Tube once  zinc sulfate 220 milliGRAM(s) Oral daily      ----------------------------------------------------------------------------------------  PHYSICAL EXAM  Constitutional - NAD, Comfortable  HEENT - NCAT, EOMI  Neck - Supple, No limited ROM  Chest - CTA bilaterally, No wheeze, No rhonchi, No crackles  Cardiovascular - RRR, S1S2, No murmurs  Abdomen - BS+, Soft, NTND  Extremities - No C/C/E, No calf tenderness   Neurologic Exam -                    Cognitive - Awake, Alert, AAO to self, place, date, year, situation     Communication - Fluent, No dysarthria     Cranial Nerves - CN 2-12 intact     Motor - No focal deficits                    LEFT    UE - ShAB 5/5, EF 5/5, EE 5/5, WE 5/5,  5/5                    RIGHT UE - ShAB 5/5, EF 5/5, EE 5/5, WE 5/5,  5/5                    LEFT    LE - HF 5/5, KE 5/5, DF 5/5, PF 5/5                    RIGHT LE - HF 5/5, KE 5/5, DF 5/5, PF 5/5        Sensory - Intact to LT     Reflexes - DTR Intact, No primitive reflexive     Coordination - FTN intact     OculoVestibular - No saccades, No nystagmus, VOR         Balance - WNL Static  Psychiatric - Mood stable, Affect WNL  ----------------------------------------------------------------------------------------  ASSESSMENT/PLAN  Patient is a 59yo F w/ AARON, peritonitis s/p Oral's procedure and ileostomy (reversed ), HTN, prior DVT/PE, ?Asthma admitted on 2020 to [facility],  transferred to [facility] for AHRF second to COVID 19, intubated on  complicated by R lung abscesses, multibacterial PNA and bacteremia, ARTEMIO s/p CRRT and HD, and s/p Tracheostomy .    On contact precautions for  pseudomonas and c diff.     Pain -  DVT PPX -   Rehab -   Patient is a 60y old  Female who presents with a chief complaint of Transfer from [facility] (03 Mar 2021 07:53)      HPI:  Patient is a 61yo F w/ AARON, peritonitis s/p Oral's procedure and ileostomy (reversed ), HTN, prior DVT/PE, ?Asthma admitted on 2020 to [facility] after presenting for  productive cough  w/ SOB x9 days and diarrhea x7 days and fever x1 day. Patient was found to be COVID + on 2020 and completed remdesivir -. Patient was intubated for airway protection on 2020. Patient has required proning (last 2020). Hospital course c/b by ATN, requiring CVVHD now on intermittent HD, unresponsive to bumex challenge last HD . Course also c/b Stenotrophomonas (, completed Levaquin x7 days), MSSA/P. mirablis bacteremia (on Cefazolin, potentially due to urine vs line-associated and MSSA in sputum due to PNA), also with lung abscesses on CT chest on .  Patient currently on Volume AC 30/350/8/35% on Precedex. Patient intermittently opens eyes but is not yet responsive. Transferred over to [facility] on 12/10 to offload [facility] COVID ICU    (10 Dec 2020 14:13)    presented to [facility] on  for AHRF second to COVID 19, intubated on  complicated by R lung abscesses, multibacterial PNA and bacteremia, ARTEMIO s/p CRRT and HD, and s/p Tracheostomy .    On contact precautions for  pseudomonas and c diff.   received HD during admission, no longer needed.   tolerating nasal cannula, decannulated on 3/1.  PEG removed on 3/2.     REVIEW OF SYSTEMS  Constitutional - No fever, No weight loss, + fatigue  HEENT - No eye pain, No visual disturbances, No difficulty hearing, No tinnitus, No vertigo, No neck pain  Respiratory - No cough, No wheezing, No shortness of breath  Cardiovascular - No chest pain, No palpitations  Gastrointestinal - No abdominal pain, No nausea, No vomiting, + diarrhea, No constipation  Genitourinary - No dysuria, No frequency, No hematuria, No incontinence  Neurological - No headaches, No memory loss, + loss of strength, No numbness, No tremors  Skin - No itching, No rashes, No lesions   Endocrine - No temperature intolerance  Musculoskeletal - No joint pain, No joint swelling, No muscle pain  Psychiatric - No depression, No anxiety    PAST MEDICAL & SURGICAL HISTORY  Pneumomediastinum    Umbilical hernia    Osteoarthritis of both knees, unspecified osteoarthritis type    AARON (Obstructive Sleep Apnea)    Pneumonia    S/P Colon Resection    pt with allergies to perfumes and addidtive to soaps, requires hypoallergenic sheets and gowns, line    Cervical Dysplasia    Latex Sensitivity    GERD (Gastroesophageal Reflux Disease)    Asthma    Anemia    S/P Joint Replacement    DVT (Deep Venous Thrombosis)    HTN (Hypertension)    H/O ileostomy    S/P LEEP    S/P Exploratory Laparotomy    S/P Colonoscopy    S/P Endoscopy    S/P  Section    History of Tubal Ligation    S/P Knee Surgery    Umbilical Hernia    Osteoarthritis of Knee    HTN (Hypertension)        SOCIAL HISTORY  Smoking - Denied  EtOH - Socially  Drugs - Denied    FUNCTIONAL HISTORY  Lives with her  and children in private house with 7 stairs inside, none outside   Independent at baseline for ambulation and adls    CURRENT FUNCTIONAL STATUS  3/2 Pt required maximum assist x2 to sit at edge of bed. Pt tolerated sitting for ~4 minutes and requested to lay back down due to increased fatigue.     FAMILY HISTORY   Family history of gastric cancer  Family history of breast cancer (Grandparent)      RECENT LABS/IMAGING  CBC Full  -  ( 03 Mar 2021 07:02 )  WBC Count : 9.10 K/uL  RBC Count : 4.04 M/uL  Hemoglobin : 10.9 g/dL  Hematocrit : 37.5 %  Platelet Count - Automated : 479 K/uL  Mean Cell Volume : 92.8 fL  Mean Cell Hemoglobin : 27.0 pg  Mean Cell Hemoglobin Concentration : 29.1 gm/dL  Auto Neutrophil # : x  Auto Lymphocyte # : x  Auto Monocyte # : x  Auto Eosinophil # : x  Auto Basophil # : x  Auto Neutrophil % : x  Auto Lymphocyte % : x  Auto Monocyte % : x  Auto Eosinophil % : x  Auto Basophil % : x        138  |  103  |  8   ----------------------------<  123<H>  4.4   |  23  |  0.41<L>    Ca    10.1      03 Mar 2021 07:12  Phos  3.8       Mg     1.9     -      Urinalysis Basic - ( 01 Mar 2021 18:31 )    Color: Yellow / Appearance: Slightly Turbid / S.010 / pH: x  Gluc: x / Ketone: Negative  / Bili: Negative / Urobili: <2 mg/dL   Blood: x / Protein: Trace / Nitrite: Negative   Leuk Esterase: Negative / RBC: 1 /HPF / WBC 6 /HPF   Sq Epi: x / Non Sq Epi: 2 /HPF / Bacteria: Few        VITALS  T(C): 36.8 (21 @ 05:40), Max: 36.8 (21 @ 05:40)  HR: 88 (21 @ 05:40) (83 - 98)  BP: 156/61 (21 @ 05:40) (133/66 - 156/61)  RR: 18 (21 @ 05:40) (18 - 19)  SpO2: 98% (21 @ 05:40) (97% - 99%)  Wt(kg): --    ALLERGIES  Kiwi (Unknown)  latex (Anaphylaxis)  latex (Unknown)  penicillin (Other)  penicillin (Unknown)  perfume  hives (Other)  potassium acetate (Other)  soap additives hives (Other)      MEDICATIONS   acetaminophen    Suspension .. 650 milliGRAM(s) Oral every 6 hours PRN  ALBUTerol    90 MICROgram(s) HFA Inhaler 2 Puff(s) Inhalation every 6 hours  amLODIPine   Tablet 5 milliGRAM(s) Oral daily  artificial tears (preservative free) Ophthalmic Solution 1 Drop(s) Both EYES every 8 hours PRN  chlorhexidine 4% Liquid 1 Application(s) Topical daily  cholestyramine Powder (Sugar-Free) 4 Gram(s) Oral two times a day  clonazePAM  Tablet 0.5 milliGRAM(s) Oral daily  collagenase Ointment 1 Application(s) Topical two times a day  dextrose 40% Gel 15 Gram(s) Oral once  dextrose 5%. 1000 milliLiter(s) IV Continuous <Continuous>  dextrose 5%. 1000 milliLiter(s) IV Continuous <Continuous>  dextrose 5%. 1000 milliLiter(s) IV Continuous <Continuous>  dextrose 50% Injectable 25 Gram(s) IV Push once  dextrose 50% Injectable 12.5 Gram(s) IV Push once  dextrose 50% Injectable 25 Gram(s) IV Push once  diphenoxylate/atropine 1 Tablet(s) Oral four times a day  glucagon  Injectable 1 milliGRAM(s) IntraMuscular once  guaiFENesin   Syrup  (Sugar-Free) 100 milliGRAM(s) Oral every 6 hours PRN  insulin lispro (ADMELOG) corrective regimen sliding scale   SubCutaneous every 6 hours  lactobacillus acidophilus 1 Tablet(s) Oral two times a day  metoprolol tartrate 50 milliGRAM(s) Oral two times a day  multivitamin 1 Tablet(s) Oral daily  nystatin Powder 1 Application(s) Topical every 8 hours  oxyCODONE    Solution 5 milliGRAM(s) Oral every 4 hours PRN  pantoprazole  Injectable 40 milliGRAM(s) IV Push daily  psyllium Powder 1 Packet(s) Oral two times a day  rivaroxaban 20 milliGRAM(s) Oral with dinner  sodium bicarbonate 650 milliGRAM(s) Oral once  Viokase 10,440 units 1 Tablet(s) 1 Tablet(s) Enteral Tube once  zinc sulfate 220 milliGRAM(s) Oral daily      ----------------------------------------------------------------------------------------  PHYSICAL EXAM  Constitutional - NAD, Comfortable, healing chin wound  HEENT - NCAT, EOMI  + nasal cannula,  dressing over decannulated trach site  Chest - no respiratory distress  Cardiovascular - RRR, S1S2   Abdomen -  Soft, NTND  + rectal tube    Extremities - No C/C/E, No calf tenderness   Neurologic Exam -                    Cognitive - Awake, Alert, AAO to self, place, date, year, situation     Communication - Fluent, No dysarthria     Cranial Nerves - CN 2-12 intact     Motor - v                    LEFT    UE - ShAB 4/5, EF 1/5, EE 3/5, WE 0/5,  0/5                    RIGHT UE - ShAB 4/5, EF 2/5, EE 3/5, WE 1/5,  2/5                    LEFT    LE - HF 2/5, KE 3/5, DF 1/5, PF 4/5                    RIGHT LE - HF 4/5, KE 3/5, DF 4/5, PF 4/5        Sensory - Intact to LT         Balance - WNL Static  Psychiatric - Mood stable, Affect WNL  ----------------------------------------------------------------------------------------  ASSESSMENT/PLAN  Patient is a 61yo F w/ AARON, peritonitis s/p Oral's procedure and ileostomy (reversed ), HTN, prior DVT/PE, ?Asthma admitted on 2020 to [facility],  transferred to [facility] for AHRF second to COVID 19, intubated on  complicated by R lung abscesses, multibacterial PNA and bacteremia, ARTEMIO s/p CRRT and HD, and s/p Tracheostomy .    On contact precautions for  pseudomonas and c diff.   Pain -  oxy ir prn  htn: norvasc, lopressor  DVT PPX - rivaroxaban 20 milliGRAM(s) Oral with dinner  Diet: peg removed. now on dysphagia 2 mech soft thin liqids  Rehab -  max of 2 for bed mobility however patient expresses motivation and willingness to participate in 3 hrs/day of therapy. She was previously independent at baseline, now with weakness due to COVID and prolonged hospital stay.  when able to tolerate bedside therapy while maintaining O2 >90 on up to 4L, and diarrhea improved  Recommend acute inpatient rehab when medically cleared  patient can tolerate 3 hrs/day of therapy    plan discussed with patient and her  by phone

## 2021-03-03 NOTE — PROGRESS NOTE ADULT - ASSESSMENT
59 YO F with PMHx of HTN, PE/DVT on Xarelto, Peritonitis s/p Oral's Procedure and Ileostomy (reversed in 2011) and OHS on BIPAP 16/9 QHS outpatient who presented to [facility] on 11/18 for AHRF second to COVID 19, intubated on 11/23 complicated by R lung abscesses on CT CHEST 12/5, multibacterial PNA and bacteremia, ARTEMIO s/p CRRT and HD, and s/p Tracheostomy 12/18 c/b track leak and s/p OR 8Bivona. Transferred to [facility] for furhter management.     # AMS/ Anxiety   - AOX3 at baseline.   - CTH and EEG WNL   - Anxiety much improved and c/w Klonopin 0.5 mg PO daily.   - Supportive care.     # Hx of OHS  - Saturating well on 2LNC throughout day/night  - Overnight pulse ox on 2L Wednesday night  - ABG Thursday AM to deem necessity of NIV upon discharge given her weight loss.     # ARDS second to COVID vs Multi-bacterial PNA/ R Lung Abscess  - s/p Intubated on 11/23 and c/b multi-bacterial PNA and lung abscesses.   - s/p Tracheostomy on 12/18 by CTSx. Sutures out 1/1  - s/p Air leak noted and s/p Bivona with CTSx 12/31. s/p Downsized to 6DCT 2/25  - s/p Successfully decannulated on 3/1, Now on 2LNC saturating well  - Proventil and Chest PT q6hr PRN    # Vasoplegic vs Septic Shock   - c/w Norvasc and Metoprolol.   - On full AC for Hx of DVT. CTA CHEST with no PEs.     # ARTEMIO s/p CRRT and HD   - ARTEMIO now resolved and no longer required HD.   - Hypokalemia in setting of diarrhea/ CDIFF. Monitor electrolytes.   - Monitor renal function and avoid nephrotoxins.  - Mild hypokalemia on 3/2, s/p PO Kcl 40 x 2, monitor in the morning     # GI   - s/p PEG tube removal on 3/2. no complications  - c/w Mechanical soft with Thin liquids    # Sepsis second to COVID vs Multi-bacterial PNA/ Bacteremia and R Lung Abscess vs CDIFF (+)   - COVID with superimposed multi-bacterial VAP vs aspiration PNA vs cavitary PNA.   - SCx (11/24) MSSA and BCx (11/27) with MSSA and Proteus Bacteremia  - SCx (12/1, 12/12 and 12/27) with Stenotrophomonas.   - SCx (1/9) with  Pseudomonas and Stenotrophomonas   - Bcx has been persistently negative from 12/1, and most recently 1/12   - CT CHEST 12/4 with right lung cavitation.   - s/p multiple antibiotics, but now completed Fortaz and Flagyl (1/12-1/16)  - RPT CT CHEST 1/28 with improvement of right lung abscess   - SCx with  Pseudomonas and Stenotrophomonas and s/p TED/ Fortaz (1/28-2/6)    # CDIFF (+)   - s/p PO Vancomycin 2/1-2/25 with no improvement in diarrhea.   - Cholestyramine attempted, but failed.   - Stool O&P and GI PCR NGTD. Check Stool Cx. c/w Lomotil QID for now.     # DMII   - Continue on ISS and monitor FS. Adjust insulin as needed.     # Hx of DVT   - c/w Xarelto.     # Skin/ Facial Wounds   - WOC recommendations appreciated   - Plastics evaluation appreciated, pending re-evaluation     # Contracted Wrists   - PT/OT working on strength.     # DVT/ GI PPX with FULL AC/ PPI   # CODE STATUS - FULL CODE   # DISPO - PT recc Rehab/ Vent Facility    61 YO F with PMHx of HTN, PE/DVT on Xarelto, Peritonitis s/p Oral's Procedure and Ileostomy (reversed in 2011) and OHS on BIPAP 16/9 QHS outpatient who presented to [facility] on 11/18 for AHRF second to COVID 19, intubated on 11/23 complicated by R lung abscesses on CT CHEST 12/5, multibacterial PNA and bacteremia, ARTEMIO s/p CRRT and HD, and s/p Tracheostomy 12/18 c/b track leak and s/p OR 8Bivona. Transferred to [facility] for furhter management.     # AMS/ Anxiety   - AOX3 at baseline.   - CTH and EEG WNL   - Anxiety much improved and c/w Klonopin 0.5 mg PO daily.   - Supportive care.     # Hx of OHS  - Saturating well on 2LNC throughout day/night  - Overnight pulse ox on 2L Wednesday night- respiratory supervisor aware   - ABG Thursday AM to deem necessity of NIV upon discharge given her weight loss.     # ARDS second to COVID vs Multi-bacterial PNA/ R Lung Abscess  - s/p Intubated on 11/23 and c/b multi-bacterial PNA and lung abscesses.   - s/p Tracheostomy on 12/18 by CTSx. Sutures out 1/1  - s/p Air leak noted and s/p Bivona with CTSx 12/31. s/p Downsized to 6DCT 2/25  - s/p Successfully decannulated on 3/1, Now on 2LNC saturating well  - Proventil and Chest PT q6hr PRN    # Vasoplegic vs Septic Shock   - c/w Norvasc and Metoprolol.   - On full AC for Hx of DVT. CTA CHEST with no PEs.     # ARTEMIO s/p CRRT and HD   - ARTEMIO now resolved and no longer required HD.   - Hypokalemia in setting of diarrhea/ CDIFF. Monitor electrolytes.   - Monitor renal function and avoid nephrotoxins.  - Mild hypokalemia on 3/2, s/p PO Kcl 40 x 2, monitor in the morning     # GI   - s/p PEG tube removal on 3/2. no complications  - c/w Mechanical soft with Thin liquids    # Sepsis second to COVID vs Multi-bacterial PNA/ Bacteremia and R Lung Abscess vs CDIFF (+)   - COVID with superimposed multi-bacterial VAP vs aspiration PNA vs cavitary PNA.   - SCx (11/24) MSSA and BCx (11/27) with MSSA and Proteus Bacteremia  - SCx (12/1, 12/12 and 12/27) with Stenotrophomonas.   - SCx (1/9) with  Pseudomonas and Stenotrophomonas   - Bcx has been persistently negative from 12/1, and most recently 1/12   - CT CHEST 12/4 with right lung cavitation.   - s/p multiple antibiotics, but now completed Fortaz and Flagyl (1/12-1/16)  - RPT CT CHEST 1/28 with improvement of right lung abscess   - SCx with  Pseudomonas and Stenotrophomonas and s/p TED/ Fortaz (1/28-2/6)    # CDIFF (+)   - s/p PO Vancomycin 2/1-2/25 with no improvement in diarrhea.   - Cholestyramine attempted, but failed.   - Stool O&P and GI PCR NGTD. Check Stool Cx. c/w Lomotil QID for now.   -stool more formed as of 3/3     # DMII   - Continue on ISS and monitor FS. Adjust insulin as needed.     # Hx of DVT   - c/w Xarelto.     # Skin/ Facial Wounds   - WOC recommendations appreciated   - Plastics evaluation appreciated, pending re-evaluation     # Contracted Wrists   - PT/OT working on strength.     # DVT/ GI PPX with FULL AC/ PPI   # CODE STATUS - FULL CODE   # DISPO - PT recc acute Rehab/ Vent Facility    59 YO F with PMHx of HTN, PE/DVT on Xarelto, Peritonitis s/p Oral's Procedure and Ileostomy (reversed in 2011) and OHS on BIPAP 16/9 QHS outpatient who presented to [facility] on 11/18 for AHRF second to COVID 19, intubated on 11/23 complicated by R lung abscesses on CT CHEST 12/5, multibacterial PNA and bacteremia, ARTEMIO s/p CRRT and HD, and s/p Tracheostomy 12/18 c/b track leak and s/p OR 8Bivona. Transferred to [facility] for furhter management.     # AMS/ Anxiety   - AOX3 at baseline.   - CTH and EEG WNL   - Anxiety much improved and c/w Klonopin 0.5 mg PO daily.   - Supportive care.     # Hx of OHS  - Saturating well on 2LNC throughout day/night  - Overnight pulse ox on 2L Wednesday night- respiratory supervisor aware   - ABG Thursday AM to deem necessity of NIV upon discharge given her weight loss.     # ARDS second to COVID vs Multi-bacterial PNA/ R Lung Abscess  - s/p Intubated on 11/23 and c/b multi-bacterial PNA and lung abscesses.   - s/p Tracheostomy on 12/18 by CTSx. Sutures out 1/1  - s/p Air leak noted and s/p Bivona with CTSx 12/31. s/p Downsized to 6DCT 2/25  - s/p Successfully decannulated on 3/1, Now on 2LNC saturating well  - Proventil and Chest PT q6hr PRN    # Vasoplegic vs Septic Shock   - c/w Norvasc and Metoprolol.   - On full AC for Hx of DVT. CTA CHEST with no PEs.     # ARTEMIO s/p CRRT and HD   - ARTEMIO now resolved and no longer required HD.   - Hypokalemia in setting of diarrhea/ CDIFF. Monitor electrolytes.   - Monitor renal function and avoid nephrotoxins.  - Mild hypokalemia on 3/2, s/p PO Kcl 40 x 2, monitor in the morning     # GI   - s/p PEG tube removal on 3/2. no complications  - c/w Mechanical soft with Thin liquids    # Sepsis second to COVID vs Multi-bacterial PNA/ Bacteremia and R Lung Abscess vs CDIFF (+)   - COVID with superimposed multi-bacterial VAP vs aspiration PNA vs cavitary PNA.   - SCx (11/24) MSSA and BCx (11/27) with MSSA and Proteus Bacteremia  - SCx (12/1, 12/12 and 12/27) with Stenotrophomonas.   - SCx (1/9) with  Pseudomonas and Stenotrophomonas   - Bcx has been persistently negative from 12/1, and most recently 1/12   - CT CHEST 12/4 with right lung cavitation.   - s/p multiple antibiotics, but now completed Fortaz and Flagyl (1/12-1/16)  - RPT CT CHEST 1/28 with improvement of right lung abscess   - SCx with  Pseudomonas and Stenotrophomonas and s/p TED/ Fortaz (1/28-2/6)    # CDIFF (+)   - s/p PO Vancomycin 2/1-2/25 with no improvement in diarrhea.   - Cholestyramine attempted, but failed.   - Stool O&P and GI PCR NGTD. Check Stool Cx. c/w Lomotil QID for now.   -stool more formed as of 3/3     # DMII   - Continue on ISS and monitor FS. Adjust insulin as needed.     # Hx of DVT   - c/w Xarelto.     # Skin/ Facial Wounds   - WOC recommendations appreciated   - Plastics evaluation appreciated, pending re-evaluation     # Contracted Wrists   - PT/OT working on strength.     # DVT/ GI PPX with FULL AC/ PPI   # CODE STATUS - FULL CODE   # DISPO - PT recc acute Rehab/ Vent Facility   When able to tolerate bedside therapy while maintaining O2 >90 on up to 4L, and diarrhea improved

## 2021-03-03 NOTE — PROGRESS NOTE ADULT - SUBJECTIVE AND OBJECTIVE BOX
Patient was seen and examined today at bedside. She is awake and alert. She has no complaints of pain, fever or chills. She was being followed for a chin   pressure ulcer due to the ET tube. The patient has no major complaints noted at this time.    ICU Vital Signs Last 24 Hrs  T(C): 36.3 (03 Mar 2021 13:16), Max: 36.8 (03 Mar 2021 05:40)  T(F): 97.3 (03 Mar 2021 13:16), Max: 98.3 (03 Mar 2021 05:40)  HR: 89 (03 Mar 2021 13:16) (83 - 98)  BP: 147/77 (03 Mar 2021 13:16) (133/66 - 156/61)  BP(mean): --  ABP: --  ABP(mean): --  RR: 17 (03 Mar 2021 13:16) (17 - 19)  SpO2: 99% (03 Mar 2021 13:16) (97% - 100%)                          10.9   9.10  )-----------( 479      ( 03 Mar 2021 07:02 )             37.5     03-03    138  |  103  |  8   ----------------------------<  123<H>  4.4   |  23  |  0.41<L>    Ca    10.1      03 Mar 2021 07:12  Phos  3.8     03-03  Mg     1.9     03-03      Physical Examination:    Examination of the chin reveals a well healed pressure ulcer. There is no evidence of an open wound.. No eschar noted. mild hypertrophic scarring is noted  No infection noted. No major contractures that are preventing opening of the mouth or speech.

## 2021-03-03 NOTE — PROGRESS NOTE ADULT - ATTENDING COMMENTS
agree with above  decannulated, doing well  overnight pulse ox on 2lpm tonight, abg in am  assessing need for nocturnal nppv  then stable for d/c to acute rehab

## 2021-03-03 NOTE — PROGRESS NOTE ADULT - SUBJECTIVE AND OBJECTIVE BOX
CHIEF COMPLAINT: Patient is a 60y old  Female who presents with a chief complaint of Transfer from [facility] (01 Mar 2021 17:23)    Interval Events: None reported overnight. Tolerating PO diet well.VSS, saturating well on 2LNC. PM&R eval pending.     REVIEW OF SYSTEMS:  see above  [x] All other systems negative      OBJECTIVE:  ICU Vital Signs Last 24 Hrs  T(C): 37.2 (02 Mar 2021 06:24), Max: 38.3 (01 Mar 2021 17:45)  T(F): 98.9 (02 Mar 2021 06:24), Max: 101 (01 Mar 2021 17:45)  HR: 80 (02 Mar 2021 07:40) (80 - 110)  BP: 148/85 (02 Mar 2021 06:24) (146/73 - 150/69)  BP(mean): --  ABP: --  ABP(mean): --  RR: 18 (02 Mar 2021 06:24) (18 - 19)  SpO2: 98% (02 Mar 2021 07:40) (94% - 100%)    Mode: standby    - @ 07:01  -  03-02 @ 07:00  --------------------------------------------------------  IN: 0 mL / OUT: 900 mL / NET: -900 mL      CAPILLARY BLOOD GLUCOSE      POCT Blood Glucose.: 130 mg/dL (02 Mar 2021 05:06)    PHYSICAL EXAM  GENERAL: Morbidly obese, NAD  NECK: Supple, No JVD, Tracheostomy stoma covered with dressing   LUNG: + Mildly coarse to b/l Upper bases, Normal respiratory effort.  HEART: +s1, s2  ABDOMEN: +BS, soft, nt/nd, obese abdomen, no peritoneals signs noted, s/p PEG removal, stoma covered with Dressing  EXTREMITIES: +2 pitting edema b/l lower ext.  NEUROLOGY: A&0x3, non-focal, no neuro deficits   SKIN: as per AAI sheet RN monitoring      HOSPITAL MEDICATIONS:  MEDICATIONS  (STANDING):  ALBUTerol    90 MICROgram(s) HFA Inhaler 2 Puff(s) Inhalation every 6 hours  amLODIPine   Tablet 5 milliGRAM(s) Oral daily  chlorhexidine 0.12% Liquid 15 milliLiter(s) Oral Mucosa every 12 hours  chlorhexidine 4% Liquid 1 Application(s) Topical daily  cholestyramine Powder (Sugar-Free) 4 Gram(s) Oral two times a day  clonazePAM  Tablet 0.5 milliGRAM(s) Oral daily  collagenase Ointment 1 Application(s) Topical two times a day  dextrose 40% Gel 15 Gram(s) Oral once  dextrose 5%. 1000 milliLiter(s) (50 mL/Hr) IV Continuous <Continuous>  dextrose 5%. 1000 milliLiter(s) (100 mL/Hr) IV Continuous <Continuous>  dextrose 5%. 1000 milliLiter(s) (75 mL/Hr) IV Continuous <Continuous>  dextrose 50% Injectable 25 Gram(s) IV Push once  dextrose 50% Injectable 12.5 Gram(s) IV Push once  dextrose 50% Injectable 25 Gram(s) IV Push once  diphenoxylate/atropine 1 Tablet(s) Oral four times a day  glucagon  Injectable 1 milliGRAM(s) IntraMuscular once  insulin lispro (ADMELOG) corrective regimen sliding scale   SubCutaneous every 6 hours  lactobacillus acidophilus 1 Tablet(s) Oral two times a day  metoprolol tartrate 50 milliGRAM(s) Oral two times a day  multivitamin 1 Tablet(s) Oral daily  nystatin Powder 1 Application(s) Topical every 8 hours  pantoprazole  Injectable 40 milliGRAM(s) IV Push daily  potassium chloride    Tablet ER 40 milliEquivalent(s) Oral every 4 hours  psyllium Powder 1 Packet(s) Oral two times a day  rivaroxaban 20 milliGRAM(s) Oral with dinner  sodium bicarbonate 650 milliGRAM(s) Oral once  Viokase 10,440 units 1 Tablet(s) 1 Tablet(s) Enteral Tube once  zinc sulfate 220 milliGRAM(s) Oral daily    MEDICATIONS  (PRN):  acetaminophen    Suspension .. 650 milliGRAM(s) Oral every 6 hours PRN Temp greater or equal to 38C (100.4F), Mild Pain (1 - 3), Moderate Pain (4 - 6)  artificial tears (preservative free) Ophthalmic Solution 1 Drop(s) Both EYES every 8 hours PRN Dry Eyes  guaiFENesin   Syrup  (Sugar-Free) 100 milliGRAM(s) Oral every 6 hours PRN Cough  oxyCODONE    Solution 5 milliGRAM(s) Oral every 4 hours PRN Severe Pain (7 - 10)      LABS:                        11.4   10.38 )-----------( 456      ( 02 Mar 2021 07:30 )             39.7     03-02    137  |  99  |  6<L>  ----------------------------<  121<H>  3.3<L>   |  25  |  0.37<L>    Ca    9.9      02 Mar 2021 07:30  Phos  4.6     03-02  Mg     1.7     03-02        Urinalysis Basic - ( 01 Mar 2021 18:31 )    Color: Yellow / Appearance: Slightly Turbid / S.010 / pH: x  Gluc: x / Ketone: Negative  / Bili: Negative / Urobili: <2 mg/dL   Blood: x / Protein: Trace / Nitrite: Negative   Leuk Esterase: Negative / RBC: 1 /HPF / WBC 6 /HPF   Sq Epi: x / Non Sq Epi: 2 /HPF / Bacteria: Few            MICROBIOLOGY:     RADIOLOGY:  [ ] Reviewed and interpreted by me    PULMONARY FUNCTION TESTS:    EKG: CHIEF COMPLAINT: Patient is a 60y old  Female who presents with a chief complaint of Transfer from [facility] (01 Mar 2021 17:23)    Interval Events: None reported overnight. Tolerating PO diet well.VSS, saturating well on 2LNC. PM&R recommending acute rehab     REVIEW OF SYSTEMS:  see above  [x] All other systems negative      OBJECTIVE:  ICU Vital Signs Last 24 Hrs  T(C): 37.2 (02 Mar 2021 06:24), Max: 38.3 (01 Mar 2021 17:45)  T(F): 98.9 (02 Mar 2021 06:24), Max: 101 (01 Mar 2021 17:45)  HR: 80 (02 Mar 2021 07:40) (80 - 110)  BP: 148/85 (02 Mar 2021 06:24) (146/73 - 150/69)  BP(mean): --  ABP: --  ABP(mean): --  RR: 18 (02 Mar 2021 06:24) (18 - 19)  SpO2: 98% (02 Mar 2021 07:40) (94% - 100%)    Mode: standby    - @ 07:01  -  03-02 @ 07:00  --------------------------------------------------------  IN: 0 mL / OUT: 900 mL / NET: -900 mL      CAPILLARY BLOOD GLUCOSE      POCT Blood Glucose.: 130 mg/dL (02 Mar 2021 05:06)    PHYSICAL EXAM  GENERAL: Morbidly obese, NAD  NECK: Supple, No JVD, Tracheostomy stoma covered with dressing   LUNG: + Mildly coarse to b/l Upper bases, Normal respiratory effort.  HEART: +s1, s2  ABDOMEN: +BS, soft, nt/nd, obese abdomen, no peritoneals signs noted, s/p PEG removal, stoma covered with Dressing  EXTREMITIES: +2 pitting edema b/l lower ext.  NEUROLOGY: A&0x3, non-focal, no neuro deficits   SKIN: as per AAI sheet RN monitoring      HOSPITAL MEDICATIONS:  MEDICATIONS  (STANDING):  ALBUTerol    90 MICROgram(s) HFA Inhaler 2 Puff(s) Inhalation every 6 hours  amLODIPine   Tablet 5 milliGRAM(s) Oral daily  chlorhexidine 0.12% Liquid 15 milliLiter(s) Oral Mucosa every 12 hours  chlorhexidine 4% Liquid 1 Application(s) Topical daily  cholestyramine Powder (Sugar-Free) 4 Gram(s) Oral two times a day  clonazePAM  Tablet 0.5 milliGRAM(s) Oral daily  collagenase Ointment 1 Application(s) Topical two times a day  dextrose 40% Gel 15 Gram(s) Oral once  dextrose 5%. 1000 milliLiter(s) (50 mL/Hr) IV Continuous <Continuous>  dextrose 5%. 1000 milliLiter(s) (100 mL/Hr) IV Continuous <Continuous>  dextrose 5%. 1000 milliLiter(s) (75 mL/Hr) IV Continuous <Continuous>  dextrose 50% Injectable 25 Gram(s) IV Push once  dextrose 50% Injectable 12.5 Gram(s) IV Push once  dextrose 50% Injectable 25 Gram(s) IV Push once  diphenoxylate/atropine 1 Tablet(s) Oral four times a day  glucagon  Injectable 1 milliGRAM(s) IntraMuscular once  insulin lispro (ADMELOG) corrective regimen sliding scale   SubCutaneous every 6 hours  lactobacillus acidophilus 1 Tablet(s) Oral two times a day  metoprolol tartrate 50 milliGRAM(s) Oral two times a day  multivitamin 1 Tablet(s) Oral daily  nystatin Powder 1 Application(s) Topical every 8 hours  pantoprazole  Injectable 40 milliGRAM(s) IV Push daily  potassium chloride    Tablet ER 40 milliEquivalent(s) Oral every 4 hours  psyllium Powder 1 Packet(s) Oral two times a day  rivaroxaban 20 milliGRAM(s) Oral with dinner  sodium bicarbonate 650 milliGRAM(s) Oral once  Viokase 10,440 units 1 Tablet(s) 1 Tablet(s) Enteral Tube once  zinc sulfate 220 milliGRAM(s) Oral daily    MEDICATIONS  (PRN):  acetaminophen    Suspension .. 650 milliGRAM(s) Oral every 6 hours PRN Temp greater or equal to 38C (100.4F), Mild Pain (1 - 3), Moderate Pain (4 - 6)  artificial tears (preservative free) Ophthalmic Solution 1 Drop(s) Both EYES every 8 hours PRN Dry Eyes  guaiFENesin   Syrup  (Sugar-Free) 100 milliGRAM(s) Oral every 6 hours PRN Cough  oxyCODONE    Solution 5 milliGRAM(s) Oral every 4 hours PRN Severe Pain (7 - 10)      LABS:                        11.4   10.38 )-----------( 456      ( 02 Mar 2021 07:30 )             39.7     03-02    137  |  99  |  6<L>  ----------------------------<  121<H>  3.3<L>   |  25  |  0.37<L>    Ca    9.9      02 Mar 2021 07:30  Phos  4.6     03-02  Mg     1.7     03-02        Urinalysis Basic - ( 01 Mar 2021 18:31 )    Color: Yellow / Appearance: Slightly Turbid / S.010 / pH: x  Gluc: x / Ketone: Negative  / Bili: Negative / Urobili: <2 mg/dL   Blood: x / Protein: Trace / Nitrite: Negative   Leuk Esterase: Negative / RBC: 1 /HPF / WBC 6 /HPF   Sq Epi: x / Non Sq Epi: 2 /HPF / Bacteria: Few            MICROBIOLOGY:     RADIOLOGY:  [ ] Reviewed and interpreted by me    PULMONARY FUNCTION TESTS:    EKG:

## 2021-03-04 LAB
BASE EXCESS BLDA CALC-SCNC: 0.6 MMOL/L — SIGNIFICANT CHANGE UP (ref -2–2)
GLUCOSE BLDC GLUCOMTR-MCNC: 113 MG/DL — HIGH (ref 70–99)
GLUCOSE BLDC GLUCOMTR-MCNC: 118 MG/DL — HIGH (ref 70–99)
GLUCOSE BLDC GLUCOMTR-MCNC: 124 MG/DL — HIGH (ref 70–99)
GLUCOSE BLDC GLUCOMTR-MCNC: 129 MG/DL — HIGH (ref 70–99)
HCO3 BLDA-SCNC: 25 MMOL/L — SIGNIFICANT CHANGE UP (ref 22–26)
PCO2 BLDA: 40 MMHG — SIGNIFICANT CHANGE UP (ref 32–48)
PH BLDA: 7.41 — SIGNIFICANT CHANGE UP (ref 7.35–7.45)
PO2 BLDA: 127 MMHG — HIGH (ref 83–108)
SAO2 % BLDA: 98.7 % — SIGNIFICANT CHANGE UP (ref 95–99)

## 2021-03-04 PROCEDURE — 99233 SBSQ HOSP IP/OBS HIGH 50: CPT | Mod: GC

## 2021-03-04 RX ORDER — DIPHENOXYLATE HCL/ATROPINE 2.5-.025MG
1 TABLET ORAL
Refills: 0 | Status: DISCONTINUED | OUTPATIENT
Start: 2021-03-04 | End: 2021-03-05

## 2021-03-04 RX ORDER — CLONAZEPAM 1 MG
0.5 TABLET ORAL DAILY
Refills: 0 | Status: DISCONTINUED | OUTPATIENT
Start: 2021-03-04 | End: 2021-03-04

## 2021-03-04 RX ORDER — CLONAZEPAM 1 MG
0.5 TABLET ORAL DAILY
Refills: 0 | Status: DISCONTINUED | OUTPATIENT
Start: 2021-03-05 | End: 2021-03-11

## 2021-03-04 RX ADMIN — AMLODIPINE BESYLATE 5 MILLIGRAM(S): 2.5 TABLET ORAL at 06:24

## 2021-03-04 RX ADMIN — Medication 1 TABLET(S): at 17:37

## 2021-03-04 RX ADMIN — Medication 1 PACKET(S): at 06:30

## 2021-03-04 RX ADMIN — ALBUTEROL 2 PUFF(S): 90 AEROSOL, METERED ORAL at 11:18

## 2021-03-04 RX ADMIN — Medication 1 APPLICATION(S): at 06:25

## 2021-03-04 RX ADMIN — Medication 1 PACKET(S): at 23:15

## 2021-03-04 RX ADMIN — NYSTATIN CREAM 1 APPLICATION(S): 100000 CREAM TOPICAL at 23:15

## 2021-03-04 RX ADMIN — ALBUTEROL 2 PUFF(S): 90 AEROSOL, METERED ORAL at 03:21

## 2021-03-04 RX ADMIN — Medication 1 APPLICATION(S): at 23:16

## 2021-03-04 RX ADMIN — CHOLESTYRAMINE 4 GRAM(S): 4 POWDER, FOR SUSPENSION ORAL at 09:00

## 2021-03-04 RX ADMIN — Medication 1 DROP(S): at 17:38

## 2021-03-04 RX ADMIN — Medication 50 MILLIGRAM(S): at 17:37

## 2021-03-04 RX ADMIN — RIVAROXABAN 20 MILLIGRAM(S): KIT at 17:37

## 2021-03-04 RX ADMIN — Medication 1 TABLET(S): at 06:24

## 2021-03-04 RX ADMIN — NYSTATIN CREAM 1 APPLICATION(S): 100000 CREAM TOPICAL at 06:28

## 2021-03-04 RX ADMIN — Medication 1 TABLET(S): at 01:27

## 2021-03-04 RX ADMIN — Medication 0.5 MILLIGRAM(S): at 12:08

## 2021-03-04 RX ADMIN — Medication 1 TABLET(S): at 23:15

## 2021-03-04 RX ADMIN — CHLORHEXIDINE GLUCONATE 1 APPLICATION(S): 213 SOLUTION TOPICAL at 12:09

## 2021-03-04 RX ADMIN — ALBUTEROL 2 PUFF(S): 90 AEROSOL, METERED ORAL at 23:16

## 2021-03-04 RX ADMIN — ZINC SULFATE TAB 220 MG (50 MG ZINC EQUIVALENT) 220 MILLIGRAM(S): 220 (50 ZN) TAB at 12:09

## 2021-03-04 RX ADMIN — Medication 1 TABLET(S): at 12:09

## 2021-03-04 RX ADMIN — Medication 0.5 MILLIGRAM(S): at 17:53

## 2021-03-04 RX ADMIN — Medication 50 MILLIGRAM(S): at 06:24

## 2021-03-04 RX ADMIN — CHOLESTYRAMINE 4 GRAM(S): 4 POWDER, FOR SUSPENSION ORAL at 23:15

## 2021-03-04 NOTE — PROGRESS NOTE ADULT - CVS HE PE MLT D E PC
regular rate and rhythm/no rub
regular rate and rhythm
regular rate and rhythm/no rub
regular rate and rhythm
regular rate and rhythm/no rub/no murmur
regular rate and rhythm/no rub/no murmur
regular rate and rhythm/no rub
regular rate and rhythm/no rub/no murmur
regular rate and rhythm
regular rate and rhythm
regular rate and rhythm/no rub/no murmur
regular rate and rhythm
regular rate and rhythm/no rub/no murmur
regular rate and rhythm
regular rate and rhythm/no rub/no murmur
regular rate and rhythm
regular rate and rhythm
regular rate and rhythm/no rub/no murmur
regular rate and rhythm
regular rate and rhythm
regular rate and rhythm/no rub/no murmur
regular rate and rhythm/no rub/no murmur
regular rate and rhythm
regular rate and rhythm/no rub/no murmur
regular rate and rhythm
regular rate and rhythm

## 2021-03-04 NOTE — PROGRESS NOTE ADULT - RESPIRATORY
detailed exam

## 2021-03-04 NOTE — PROGRESS NOTE ADULT - GASTROINTESTINAL DETAILS
peg/soft/no distention
soft/nontender/no guarding/no rigidity
PEG/soft/no distention
soft/no distention
soft/nontender/no distention
soft/no distention
soft/nontender
PEG/soft/no distention
soft/no distention
soft/nontender
soft/nontender/no distention
PEG/soft/no distention
soft/nontender/no distention
soft/no distention
PEG/soft
soft/nontender
soft/no distention
soft/no distention
+ PEG/soft/no distention
PEG/soft/no distention
PEG/soft/no distention
soft/no distention
+ PEG/soft/no distention
soft/nontender/no distention
soft/nontender/no distention
soft/no distention
soft/no distention
soft/nontender/no distention
soft/nontender/no distention
+ PEG/soft/no distention
soft/no distention
PEG/soft/no distention
PEG/soft/no distention
soft/no distention
PEG/soft/no distention

## 2021-03-04 NOTE — PROGRESS NOTE ADULT - NS ABD PE RECTAL EXAM
not examined

## 2021-03-04 NOTE — PROGRESS NOTE ADULT - GASTROINTESTINAL COMMENTS
Flexiseal placed
reducible abdominal hernia
PEG C/D/I

## 2021-03-04 NOTE — PROGRESS NOTE ADULT - CONSTITUTIONAL DETAILS
Mild distress/distress due to pain
well-developed/well-groomed/obese
no distress
well-developed/well-groomed/obese
no distress
no distress
well-developed/well-groomed/obese
no distress
well-developed/well-groomed/obese
well-developed/well-groomed/obese
well-developed/well-groomed/well-nourished
no distress
well-developed/well-groomed/obese
well-developed/well-groomed/obese
no distress
no distress
respiratory distress
no distress
well-developed/well-groomed/obese
+ cough/respiratory distress
no distress
no distress
well-developed/well-groomed/obese
well-developed/well-groomed/obese
no distress
respiratory distress
no distress
well-developed/well-groomed/obese
no distress
well-developed/well-groomed/well-nourished
no distress

## 2021-03-04 NOTE — PROGRESS NOTE ADULT - GUM GEN PE MLT EXAM PC
not examined
detailed exam
not examined

## 2021-03-04 NOTE — PROGRESS NOTE ADULT - I WAS PHYSICALLY PRESENT FOR THE KEY PORTIONS OF THE EVALUATION AND MANAGEMENT (E/M) SERVICE PROVIDED.  I AGREE WITH THE ABOVE HISTORY, PHYSICAL, AND PLAN WHICH I HAVE REVIEWED AND EDITED WHERE APPROPRIATE

## 2021-03-04 NOTE — PROGRESS NOTE ADULT - SUBJECTIVE AND OBJECTIVE BOX
CHIEF COMPLAINT: Patient is a 60y old  Female who presents with a chief complaint of Transfer from [facility].    Interval Events:    REVIEW OF SYSTEMS:  [ ] All other systems negative  [ ] Unable to assess ROS because ________    OBJECTIVE:  ICU Vital Signs Last 24 Hrs  T(C): 36.4 (04 Mar 2021 08:58), Max: 36.6 (03 Mar 2021 20:00)  T(F): 97.6 (04 Mar 2021 08:58), Max: 97.8 (03 Mar 2021 20:00)  HR: 88 (04 Mar 2021 08:58) (77 - 99)  BP: 121/79 (04 Mar 2021 08:58) (121/79 - 147/77)  RR: 16 (04 Mar 2021 08:58) (16 - 18)  SpO2: 99% (04 Mar 2021 08:58) (97% - 100%)      CAPILLARY BLOOD GLUCOSE      POCT Blood Glucose.: 129 mg/dL (04 Mar 2021 08:12)      HOSPITAL MEDICATIONS:  MEDICATIONS  (STANDING):  ALBUTerol    90 MICROgram(s) HFA Inhaler 2 Puff(s) Inhalation every 6 hours  amLODIPine   Tablet 5 milliGRAM(s) Oral daily  chlorhexidine 4% Liquid 1 Application(s) Topical daily  cholestyramine Powder (Sugar-Free) 4 Gram(s) Oral two times a day  clonazePAM  Tablet 0.5 milliGRAM(s) Oral daily  collagenase Ointment 1 Application(s) Topical two times a day  dextrose 40% Gel 15 Gram(s) Oral once  dextrose 5%. 1000 milliLiter(s) (50 mL/Hr) IV Continuous <Continuous>  dextrose 5%. 1000 milliLiter(s) (100 mL/Hr) IV Continuous <Continuous>  dextrose 5%. 1000 milliLiter(s) (75 mL/Hr) IV Continuous <Continuous>  dextrose 50% Injectable 25 Gram(s) IV Push once  dextrose 50% Injectable 12.5 Gram(s) IV Push once  dextrose 50% Injectable 25 Gram(s) IV Push once  diphenoxylate/atropine 1 Tablet(s) Oral four times a day  glucagon  Injectable 1 milliGRAM(s) IntraMuscular once  insulin lispro (ADMELOG) corrective regimen sliding scale   SubCutaneous Before meals and at bedtime  lactobacillus acidophilus 1 Tablet(s) Oral two times a day  metoprolol tartrate 50 milliGRAM(s) Oral two times a day  multivitamin 1 Tablet(s) Oral daily  nystatin Powder 1 Application(s) Topical every 8 hours  pantoprazole  Injectable 40 milliGRAM(s) IV Push daily  psyllium Powder 1 Packet(s) Oral two times a day  rivaroxaban 20 milliGRAM(s) Oral with dinner  sodium bicarbonate 650 milliGRAM(s) Oral once  Viokase 10,440 units 1 Tablet(s) 1 Tablet(s) Enteral Tube once  zinc sulfate 220 milliGRAM(s) Oral daily    MEDICATIONS  (PRN):  acetaminophen    Suspension .. 650 milliGRAM(s) Oral every 6 hours PRN Temp greater or equal to 38C (100.4F), Mild Pain (1 - 3), Moderate Pain (4 - 6)  artificial tears (preservative free) Ophthalmic Solution 1 Drop(s) Both EYES every 8 hours PRN Dry Eyes  guaiFENesin   Syrup  (Sugar-Free) 100 milliGRAM(s) Oral every 6 hours PRN Cough  oxyCODONE    Solution 5 milliGRAM(s) Oral every 4 hours PRN Severe Pain (7 - 10)      LABS:                        MICROBIOLOGY:     RADIOLOGY:  [ ] Reviewed and interpreted by me    PULMONARY FUNCTION TESTS:    EKG: CHIEF COMPLAINT: Patient is a 60y old  Female who presents with a chief complaint of Transfer from [facility].    Interval Events: No interval events noted overnight.     REVIEW OF SYSTEMS:  Denies fever, SOB, CP, abd pain, N/V  [x] All other systems negative    OBJECTIVE:  ICU Vital Signs Last 24 Hrs  T(C): 36.4 (04 Mar 2021 08:58), Max: 36.6 (03 Mar 2021 20:00)  T(F): 97.6 (04 Mar 2021 08:58), Max: 97.8 (03 Mar 2021 20:00)  HR: 88 (04 Mar 2021 08:58) (77 - 99)  BP: 121/79 (04 Mar 2021 08:58) (121/79 - 147/77)  RR: 16 (04 Mar 2021 08:58) (16 - 18)  SpO2: 99% (04 Mar 2021 08:58) (97% - 100%)      CAPILLARY BLOOD GLUCOSE      POCT Blood Glucose.: 129 mg/dL (04 Mar 2021 08:12)      HOSPITAL MEDICATIONS:  MEDICATIONS  (STANDING):  ALBUTerol    90 MICROgram(s) HFA Inhaler 2 Puff(s) Inhalation every 6 hours  amLODIPine   Tablet 5 milliGRAM(s) Oral daily  chlorhexidine 4% Liquid 1 Application(s) Topical daily  cholestyramine Powder (Sugar-Free) 4 Gram(s) Oral two times a day  clonazePAM  Tablet 0.5 milliGRAM(s) Oral daily  collagenase Ointment 1 Application(s) Topical two times a day  dextrose 40% Gel 15 Gram(s) Oral once  dextrose 5%. 1000 milliLiter(s) (50 mL/Hr) IV Continuous <Continuous>  dextrose 5%. 1000 milliLiter(s) (100 mL/Hr) IV Continuous <Continuous>  dextrose 5%. 1000 milliLiter(s) (75 mL/Hr) IV Continuous <Continuous>  dextrose 50% Injectable 25 Gram(s) IV Push once  dextrose 50% Injectable 12.5 Gram(s) IV Push once  dextrose 50% Injectable 25 Gram(s) IV Push once  diphenoxylate/atropine 1 Tablet(s) Oral four times a day  glucagon  Injectable 1 milliGRAM(s) IntraMuscular once  insulin lispro (ADMELOG) corrective regimen sliding scale   SubCutaneous Before meals and at bedtime  lactobacillus acidophilus 1 Tablet(s) Oral two times a day  metoprolol tartrate 50 milliGRAM(s) Oral two times a day  multivitamin 1 Tablet(s) Oral daily  nystatin Powder 1 Application(s) Topical every 8 hours  pantoprazole  Injectable 40 milliGRAM(s) IV Push daily  psyllium Powder 1 Packet(s) Oral two times a day  rivaroxaban 20 milliGRAM(s) Oral with dinner  sodium bicarbonate 650 milliGRAM(s) Oral once  Viokase 10,440 units 1 Tablet(s) 1 Tablet(s) Enteral Tube once  zinc sulfate 220 milliGRAM(s) Oral daily    MEDICATIONS  (PRN):  acetaminophen    Suspension .. 650 milliGRAM(s) Oral every 6 hours PRN Temp greater or equal to 38C (100.4F), Mild Pain (1 - 3), Moderate Pain (4 - 6)  artificial tears (preservative free) Ophthalmic Solution 1 Drop(s) Both EYES every 8 hours PRN Dry Eyes  guaiFENesin   Syrup  (Sugar-Free) 100 milliGRAM(s) Oral every 6 hours PRN Cough  oxyCODONE    Solution 5 milliGRAM(s) Oral every 4 hours PRN Severe Pain (7 - 10)      LABS:                        MICROBIOLOGY:     RADIOLOGY:  [ ] Reviewed and interpreted by me    PULMONARY FUNCTION TESTS:    EKG:

## 2021-03-04 NOTE — PROGRESS NOTE ADULT - RS GEN PE MLT RESP DETAILS PC
coarse bs bilat/airway patent/breath sounds equal
bilat coarse bs/airway patent/breath sounds equal
Bilat coarse bs /loose cough/airway patent/breath sounds equal
airway patent/breath sounds equal/good air movement
bilat coarse bs/airway patent/breath sounds equal
Bilat coarse bs/airway patent/breath sounds equal
bilat coarse bs/airway patent/good air movement
Coarse bs/airway patent/breath sounds equal
airway patent/breath sounds equal/good air movement
bilat coarse bs /bld tinged secretions/airway patent/breath sounds equal
bilat coarse bs, loose cough/airway patent/breath sounds equal
airway patent/breath sounds equal
airway patent/breath sounds equal/good air movement
airway patent/breath sounds equal/good air movement
airway patent/breath sounds equal
coarse bs bilat + cough/airway patent/breath sounds equal
airway patent/breath sounds equal/good air movement
coarse bs bilat /loose cough/airway patent/breath sounds equal
coarse bs bilat/airway patent/breath sounds equal
coarse bs bilat/airway patent/breath sounds equal
airway patent/breath sounds equal/good air movement
airway patent/breath sounds equal/respirations non-labored
airway patent/good air movement
airway patent/breath sounds equal/good air movement
airway patent/breath sounds equal/good air movement
bilat coarse bs/airway patent/breath sounds equal
COarse bilat bs/airway patent/breath sounds equal
Coarse bs bilat/airway patent/breath sounds equal
bilat bs/airway patent/breath sounds equal
airway patent/breath sounds equal
Coarse bs bilat/airway patent/breath sounds equal
Loose cough /coarse bs/airway patent/breath sounds equal
bilat coarse bs/airway patent/breath sounds equal
bilat coarse bs/airway patent/breath sounds equal
Coarse bs few rhonchi/airway patent/breath sounds equal
coarse bs bilat/airway patent/breath sounds equal
coarse bs bilat/airway patent/breath sounds equal

## 2021-03-04 NOTE — PROGRESS NOTE ADULT - ADDITIONAL PE
A&Ox4  Follows commands
A&Ox4
A&Ox4  Is able to move all extremities although weakly.
Patient still appearing sedated.
A&Ox4
A&Ox4  Follows commands but weak extremities.
A&Ox4  Upper extremity R>L movement  Able to move lower extremities with some difficulty.
A&Ox4  Weak upper and lower extremities  Able to follow commands.
A&Ox4  Able to follow commands and moves all extremities with difficulty
A&Ox4  Responds appropriately and is able to move all extremities, with some difficulty.
A&Ox4  episodes of coughing fits on exam  ABle to follow commands and move all extremities
A&Ox4  Tolerating CPAP
A&Ox4; Moves all extremities

## 2021-03-04 NOTE — PROGRESS NOTE ADULT - ASSESSMENT
61 YO F with PMHx of HTN, PE/DVT on Xarelto, Peritonitis s/p Oral's Procedure and Ileostomy (reversed in 2011) and OHS on BIPAP 16/9 QHS outpatient who presented to [facility] on 11/18 for AHRF second to COVID 19, intubated on 11/23 complicated by R lung abscesses on CT CHEST 12/5, multibacterial PNA and bacteremia, ARTEMIO s/p CRRT and HD, and s/p Tracheostomy 12/18 c/b track leak and s/p OR 8Bivona. Transferred to [facility] for furhter management.     # AMS/ Anxiety   - AOX3 at baseline.   - CTH and EEG WNL   - Anxiety much improved and c/w Klonopin 0.5 mg PO daily.   - Supportive care.     # Hx of OHS  - Saturating well on 2LNC throughout day/night  - Overnight pulse ox on 2L Wednesday night- respiratory supervisor aware   - ABG Thursday AM to deem necessity of NIV upon discharge given her weight loss.     # ARDS second to COVID vs Multi-bacterial PNA/ R Lung Abscess  - s/p Intubated on 11/23 and c/b multi-bacterial PNA and lung abscesses.   - s/p Tracheostomy on 12/18 by CTSx. Sutures out 1/1  - s/p Air leak noted and s/p Bivona with CTSx 12/31. s/p Downsized to 6DCT 2/25  - s/p Successfully decannulated on 3/1, Now on 2LNC saturating well  - Proventil and Chest PT q6hr PRN    # Vasoplegic vs Septic Shock   - c/w Norvasc and Metoprolol.   - On full AC for Hx of DVT. CTA CHEST with no PEs.     # ARTEMIO s/p CRRT and HD   - ARTEMIO now resolved and no longer required HD.   - Hypokalemia in setting of diarrhea/ CDIFF. Monitor electrolytes.   - Monitor renal function and avoid nephrotoxins.  - Mild hypokalemia on 3/2, s/p PO Kcl 40 x 2, monitor in the morning     # GI   - s/p PEG tube removal on 3/2. no complications  - c/w Mechanical soft with Thin liquids    # Sepsis second to COVID vs Multi-bacterial PNA/ Bacteremia and R Lung Abscess vs CDIFF (+)   - COVID with superimposed multi-bacterial VAP vs aspiration PNA vs cavitary PNA.   - SCx (11/24) MSSA and BCx (11/27) with MSSA and Proteus Bacteremia  - SCx (12/1, 12/12 and 12/27) with Stenotrophomonas.   - SCx (1/9) with  Pseudomonas and Stenotrophomonas   - Bcx has been persistently negative from 12/1, and most recently 1/12   - CT CHEST 12/4 with right lung cavitation.   - s/p multiple antibiotics, but now completed Fortaz and Flagyl (1/12-1/16)  - RPT CT CHEST 1/28 with improvement of right lung abscess   - SCx with  Pseudomonas and Stenotrophomonas and s/p TED/ Fortaz (1/28-2/6)    # CDIFF (+)   - s/p PO Vancomycin 2/1-2/25 with no improvement in diarrhea.   - Cholestyramine attempted, but failed.   - Stool O&P and GI PCR NGTD. Check Stool Cx. c/w Lomotil QID for now.   -stool more formed as of 3/3     # DMII   - Continue on ISS and monitor FS. Adjust insulin as needed.     # Hx of DVT   - c/w Xarelto.     # Skin/ Facial Wounds   - WOC recommendations appreciated   - Plastics evaluation appreciated, pending re-evaluation     # Contracted Wrists   - PT/OT working on strength.     # DVT/ GI PPX with FULL AC/ PPI   # CODE STATUS - FULL CODE   # DISPO - PT recc acute Rehab/ Vent Facility   When able to tolerate bedside therapy while maintaining O2 >90 on up to 4L, and diarrhea improved     59 YO F with PMHx of HTN, PE/DVT on Xarelto, Peritonitis s/p Oral's Procedure and Ileostomy (reversed in 2011) and OHS on BIPAP 16/9 QHS outpatient who presented to [facility] on 11/18 for AHRF second to COVID 19, intubated on 11/23 complicated by R lung abscesses on CT CHEST 12/5, multibacterial PNA and bacteremia, ARTEMIO s/p CRRT and HD, and s/p Tracheostomy 12/18 c/b track leak and s/p OR 8Bivona. Transferred to [facility] for furhter management.     # AMS/ Anxiety   - AOX3 at baseline.   - CTH and EEG WNL   - Anxiety much improved and c/w Klonopin 0.5 mg PO daily.   - Supportive care.     # Hx of OHS  - Saturating well on 2LNC throughout day/night  - Overnight pulse ox on 2L Wednesday night- respiratory supervisor aware   - ABG Thursday AM to deem necessity of NIV upon discharge given her weight loss.     # ARDS second to COVID vs Multi-bacterial PNA/ R Lung Abscess  - s/p Intubated on 11/23 and c/b multi-bacterial PNA and lung abscesses.   - s/p Tracheostomy on 12/18 by CTSx. Sutures out 1/1  - s/p Air leak noted and s/p Bivona with CTSx 12/31. s/p Downsized to 6DCT 2/25  - s/p Successfully decannulated on 3/1, Now on 2LNC saturating well  - Proventil and Chest PT q6hr PRN    # Vasoplegic vs Septic Shock   - c/w Norvasc and Metoprolol.   - On full AC for Hx of DVT. CTA CHEST with no PEs.     # ARTEMIO s/p CRRT and HD   - ARTEMIO now resolved and no longer required HD.   - Hypokalemia in setting of diarrhea/ CDIFF. Monitor electrolytes.   - Monitor renal function and avoid nephrotoxins.  - Mild hypokalemia on 3/2, s/p PO Kcl 40 x 2, monitor in the morning     # GI   - s/p PEG tube removal on 3/2. no complications  - c/w Mechanical soft with Thin liquids    # Sepsis second to COVID vs Multi-bacterial PNA/ Bacteremia and R Lung Abscess vs CDIFF (+)   - COVID with superimposed multi-bacterial VAP vs aspiration PNA vs cavitary PNA.   - SCx (11/24) MSSA and BCx (11/27) with MSSA and Proteus Bacteremia  - SCx (12/1, 12/12 and 12/27) with Stenotrophomonas.   - SCx (1/9) with  Pseudomonas and Stenotrophomonas   - Bcx has been persistently negative from 12/1, and most recently 1/12   - CT CHEST 12/4 with right lung cavitation.   - s/p multiple antibiotics, but now completed Fortaz and Flagyl (1/12-1/16)  - RPT CT CHEST 1/28 with improvement of right lung abscess   - SCx with  Pseudomonas and Stenotrophomonas and s/p TED/ Fortaz (1/28-2/6)    # CDIFF (+)   - s/p PO Vancomycin 2/1-2/25 with no improvement in diarrhea.   - Cholestyramine attempted, but failed.   - Stool O&P and GI PCR NGTD. Check Stool Cx. c/w Lomotil QID for now.   -stool more formed as of 3/4. Will need at least 48 hrs of formed stool prior to DC. Earliest pt would be able to dc'd is Saturday 3/6    # DMII   - Continue on ISS and monitor FS. Adjust insulin as needed.     # Hx of DVT   - c/w Xarelto.     # Skin/ Facial Wounds   - WOC recommendations appreciated   - Plastics evaluation appreciated, pending re-evaluation     # Contracted Wrists   - PT/OT working on strength.     # DVT/ GI PPX with FULL AC/ PPI   # CODE STATUS - FULL CODE   # DISPO - PT recc acute Rehab  When able to tolerate bedside therapy while maintaining O2 >90 on 2L, and diarrhea improved

## 2021-03-04 NOTE — PROGRESS NOTE ADULT - NECK DETAILS
trach to vent
Trach C/D/I
trach to vent
Trach C/D/I
decannulated with foam dressing
Trach C/D/I
trach to vent
trach to vent
trach to vent/collar
trach to vent
trach to vent
Trach C/D/I
Trach C/D/I
trach to vent
Trach C/D/I
Trach C/D/I
trach to vent
Trach C/D/I
Trach C/D/I with gauze around stoma
trach to vent
trach to vent + blood tinged secretions
trach to vent / Eschar areas on chin
trach to vent
trach to vent/collar
Trach C/D/I
trach to vent
trach to vent
Nohelia ramon to vent
+ Trach to vent
supple

## 2021-03-04 NOTE — PROGRESS NOTE ADULT - ATTENDING COMMENTS
agree with above  de-cannulated. peg removed  stable on NC 2lpm  assessing need for nocturnal nppv - did not desaturate overnight on 2lpm by review  abg today  s/p tx for cdiff. today, formed stool  d/c plan - acute to acute rehab pending bed availability

## 2021-03-05 LAB
ANION GAP SERPL CALC-SCNC: 13 MMOL/L — SIGNIFICANT CHANGE UP (ref 7–14)
BUN SERPL-MCNC: 11 MG/DL — SIGNIFICANT CHANGE UP (ref 7–23)
CALCIUM SERPL-MCNC: 10.1 MG/DL — SIGNIFICANT CHANGE UP (ref 8.4–10.5)
CHLORIDE SERPL-SCNC: 102 MMOL/L — SIGNIFICANT CHANGE UP (ref 98–107)
CO2 SERPL-SCNC: 23 MMOL/L — SIGNIFICANT CHANGE UP (ref 22–31)
CREAT SERPL-MCNC: 0.38 MG/DL — LOW (ref 0.5–1.3)
GLUCOSE BLDC GLUCOMTR-MCNC: 141 MG/DL — HIGH (ref 70–99)
GLUCOSE SERPL-MCNC: 114 MG/DL — HIGH (ref 70–99)
HCT VFR BLD CALC: 38.1 % — SIGNIFICANT CHANGE UP (ref 34.5–45)
HGB BLD-MCNC: 11.1 G/DL — LOW (ref 11.5–15.5)
MCHC RBC-ENTMCNC: 26.6 PG — LOW (ref 27–34)
MCHC RBC-ENTMCNC: 29.1 GM/DL — LOW (ref 32–36)
MCV RBC AUTO: 91.4 FL — SIGNIFICANT CHANGE UP (ref 80–100)
NRBC # BLD: 0 /100 WBCS — SIGNIFICANT CHANGE UP
NRBC # FLD: 0 K/UL — SIGNIFICANT CHANGE UP
PLATELET # BLD AUTO: 498 K/UL — HIGH (ref 150–400)
POTASSIUM SERPL-MCNC: 5.2 MMOL/L — SIGNIFICANT CHANGE UP (ref 3.5–5.3)
POTASSIUM SERPL-SCNC: 5.2 MMOL/L — SIGNIFICANT CHANGE UP (ref 3.5–5.3)
RBC # BLD: 4.17 M/UL — SIGNIFICANT CHANGE UP (ref 3.8–5.2)
RBC # FLD: 15.9 % — HIGH (ref 10.3–14.5)
SODIUM SERPL-SCNC: 138 MMOL/L — SIGNIFICANT CHANGE UP (ref 135–145)
WBC # BLD: 8.61 K/UL — SIGNIFICANT CHANGE UP (ref 3.8–10.5)
WBC # FLD AUTO: 8.61 K/UL — SIGNIFICANT CHANGE UP (ref 3.8–10.5)

## 2021-03-05 PROCEDURE — 99233 SBSQ HOSP IP/OBS HIGH 50: CPT | Mod: GC

## 2021-03-05 PROCEDURE — 99232 SBSQ HOSP IP/OBS MODERATE 35: CPT

## 2021-03-05 RX ORDER — DIPHENOXYLATE HCL/ATROPINE 2.5-.025MG
1 TABLET ORAL
Refills: 0 | Status: DISCONTINUED | OUTPATIENT
Start: 2021-03-05 | End: 2021-03-11

## 2021-03-05 RX ORDER — LACTOBACILLUS ACIDOPHILUS 100MM CELL
1 CAPSULE ORAL
Refills: 0 | Status: DISCONTINUED | OUTPATIENT
Start: 2021-03-05 | End: 2021-03-19

## 2021-03-05 RX ORDER — ZINC SULFATE TAB 220 MG (50 MG ZINC EQUIVALENT) 220 (50 ZN) MG
220 TAB ORAL DAILY
Refills: 0 | Status: DISCONTINUED | OUTPATIENT
Start: 2021-03-05 | End: 2021-03-19

## 2021-03-05 RX ADMIN — Medication 650 MILLIGRAM(S): at 09:07

## 2021-03-05 RX ADMIN — AMLODIPINE BESYLATE 5 MILLIGRAM(S): 2.5 TABLET ORAL at 06:06

## 2021-03-05 RX ADMIN — Medication 1 TABLET(S): at 13:45

## 2021-03-05 RX ADMIN — Medication 1 TABLET(S): at 17:57

## 2021-03-05 RX ADMIN — ZINC SULFATE TAB 220 MG (50 MG ZINC EQUIVALENT) 220 MILLIGRAM(S): 220 (50 ZN) TAB at 13:38

## 2021-03-05 RX ADMIN — Medication 650 MILLIGRAM(S): at 23:50

## 2021-03-05 RX ADMIN — Medication 650 MILLIGRAM(S): at 00:58

## 2021-03-05 RX ADMIN — Medication 650 MILLIGRAM(S): at 01:58

## 2021-03-05 RX ADMIN — Medication 1 TABLET(S): at 06:06

## 2021-03-05 RX ADMIN — Medication 1 DROP(S): at 06:06

## 2021-03-05 RX ADMIN — Medication 1 TABLET(S): at 13:38

## 2021-03-05 RX ADMIN — Medication 1 APPLICATION(S): at 06:07

## 2021-03-05 RX ADMIN — Medication 1 PACKET(S): at 06:06

## 2021-03-05 RX ADMIN — ALBUTEROL 2 PUFF(S): 90 AEROSOL, METERED ORAL at 06:05

## 2021-03-05 RX ADMIN — Medication 1 APPLICATION(S): at 17:57

## 2021-03-05 RX ADMIN — NYSTATIN CREAM 1 APPLICATION(S): 100000 CREAM TOPICAL at 22:48

## 2021-03-05 RX ADMIN — ALBUTEROL 2 PUFF(S): 90 AEROSOL, METERED ORAL at 17:57

## 2021-03-05 RX ADMIN — PANTOPRAZOLE SODIUM 40 MILLIGRAM(S): 20 TABLET, DELAYED RELEASE ORAL at 13:38

## 2021-03-05 RX ADMIN — CHLORHEXIDINE GLUCONATE 1 APPLICATION(S): 213 SOLUTION TOPICAL at 17:56

## 2021-03-05 RX ADMIN — Medication 50 MILLIGRAM(S): at 06:06

## 2021-03-05 RX ADMIN — CHOLESTYRAMINE 4 GRAM(S): 4 POWDER, FOR SUSPENSION ORAL at 22:48

## 2021-03-05 RX ADMIN — Medication 650 MILLIGRAM(S): at 09:40

## 2021-03-05 RX ADMIN — NYSTATIN CREAM 1 APPLICATION(S): 100000 CREAM TOPICAL at 06:06

## 2021-03-05 RX ADMIN — ALBUTEROL 2 PUFF(S): 90 AEROSOL, METERED ORAL at 22:48

## 2021-03-05 RX ADMIN — RIVAROXABAN 20 MILLIGRAM(S): KIT at 17:57

## 2021-03-05 RX ADMIN — Medication 0.5 MILLIGRAM(S): at 13:37

## 2021-03-05 RX ADMIN — ALBUTEROL 2 PUFF(S): 90 AEROSOL, METERED ORAL at 09:07

## 2021-03-05 RX ADMIN — CHOLESTYRAMINE 4 GRAM(S): 4 POWDER, FOR SUSPENSION ORAL at 13:37

## 2021-03-05 RX ADMIN — Medication 50 MILLIGRAM(S): at 17:58

## 2021-03-05 RX ADMIN — Medication 650 MILLIGRAM(S): at 22:53

## 2021-03-05 RX ADMIN — NYSTATIN CREAM 1 APPLICATION(S): 100000 CREAM TOPICAL at 13:38

## 2021-03-05 NOTE — PROGRESS NOTE ADULT - SUBJECTIVE AND OBJECTIVE BOX
Patient is a 60y old  Female who presents with a chief complaint of Transfer from [facility] (05 Mar 2021 11:04)        SUBJECTIVE / OVERNIGHT EVENTS:  no acute events o/n  pt feels well  denies cp/sob/abdominal pain  looking fwd to dc to rehab        MEDICATIONS  (STANDING):  ALBUTerol    90 MICROgram(s) HFA Inhaler 2 Puff(s) Inhalation every 6 hours  amLODIPine   Tablet 5 milliGRAM(s) Oral daily  chlorhexidine 4% Liquid 1 Application(s) Topical daily  cholestyramine Powder (Sugar-Free) 4 Gram(s) Oral two times a day  clonazePAM  Tablet 0.5 milliGRAM(s) Oral daily  collagenase Ointment 1 Application(s) Topical two times a day  dextrose 40% Gel 15 Gram(s) Oral once  dextrose 5%. 1000 milliLiter(s) (50 mL/Hr) IV Continuous <Continuous>  dextrose 5%. 1000 milliLiter(s) (100 mL/Hr) IV Continuous <Continuous>  dextrose 5%. 1000 milliLiter(s) (75 mL/Hr) IV Continuous <Continuous>  dextrose 50% Injectable 25 Gram(s) IV Push once  dextrose 50% Injectable 12.5 Gram(s) IV Push once  dextrose 50% Injectable 25 Gram(s) IV Push once  diphenoxylate/atropine 1 Tablet(s) Oral four times a day  glucagon  Injectable 1 milliGRAM(s) IntraMuscular once  insulin lispro (ADMELOG) corrective regimen sliding scale   SubCutaneous Before meals and at bedtime  lactobacillus acidophilus 1 Tablet(s) Oral two times a day  metoprolol tartrate 50 milliGRAM(s) Oral two times a day  multivitamin 1 Tablet(s) Oral daily  nystatin Powder 1 Application(s) Topical every 8 hours  pantoprazole  Injectable 40 milliGRAM(s) IV Push daily  psyllium Powder 1 Packet(s) Oral two times a day  rivaroxaban 20 milliGRAM(s) Oral with dinner  Viokase 10,440 units 1 Tablet(s) 1 Tablet(s) Enteral Tube once  zinc sulfate 220 milliGRAM(s) Oral daily    MEDICATIONS  (PRN):  acetaminophen    Suspension .. 650 milliGRAM(s) Oral every 6 hours PRN Temp greater or equal to 38C (100.4F), Mild Pain (1 - 3), Moderate Pain (4 - 6)  artificial tears (preservative free) Ophthalmic Solution 1 Drop(s) Both EYES every 8 hours PRN Dry Eyes  guaiFENesin   Syrup  (Sugar-Free) 100 milliGRAM(s) Oral every 6 hours PRN Cough      Vital Signs Last 24 Hrs  T(C): 36.7 (05 Mar 2021 05:53), Max: 36.8 (04 Mar 2021 21:15)  T(F): 98 (05 Mar 2021 05:53), Max: 98.2 (04 Mar 2021 21:15)  HR: 84 (05 Mar 2021 05:53) (81 - 91)  BP: 149/74 (05 Mar 2021 05:53) (126/68 - 149/74)  BP(mean): --  RR: 16 (05 Mar 2021 05:53) (16 - 16)  SpO2: 97% (05 Mar 2021 05:53) (97% - 98%)  CAPILLARY BLOOD GLUCOSE      POCT Blood Glucose.: 124 mg/dL (05 Mar 2021 12:48)  POCT Blood Glucose.: 141 mg/dL (05 Mar 2021 08:54)  POCT Blood Glucose.: 118 mg/dL (04 Mar 2021 21:29)  POCT Blood Glucose.: 113 mg/dL (04 Mar 2021 17:23)    I&O's Summary    04 Mar 2021 07:01  -  05 Mar 2021 07:00  --------------------------------------------------------  IN: 180 mL / OUT: 900 mL / NET: -720 mL        PHYSICAL EXAM:  General: NAD, AAOX3, in good spirits   HEENT: neck dressing c/d/i   Respiratory: CTAB  Cardiovascular: S1S2, RRR  Abdomen: Soft, NTND, BS+  Extremities: No edema    LABS:                        11.1   8.61  )-----------( 498      ( 05 Mar 2021 06:47 )             38.1     03-05    138  |  102  |  11  ----------------------------<  114<H>  5.2   |  23  |  0.38<L>    Ca    10.1      05 Mar 2021 06:47                RADIOLOGY & ADDITIONAL TESTS:    Imaging Personally Reviewed:  Consultant(s) Notes Reviewed:    Care Discussed with Consultants/Other Providers:

## 2021-03-05 NOTE — CONSULT NOTE ADULT - CONSULT REQUESTED DATE/TIME
05-Mar-2021 09:43
13-Feb-2021 09:45
15-Dec-2020 18:50
16-Dec-2020
26-Dec-2020 12:46
03-Mar-2021 09:48
06-Jan-2021
04-Jan-2021
08-Jan-2021 15:32
25-Feb-2021 21:41

## 2021-03-05 NOTE — CONSULT NOTE ADULT - PROVIDER SPECIALTY LIST ADULT
Gastroenterology
Rehab Medicine
Intervent Radiology
Podiatry
Pulmonology
Intervent Radiology
Surgery
Thoracic Surgery
Infectious Disease
Plastic Surgery

## 2021-03-05 NOTE — CONSULT NOTE ADULT - PROBLEM SELECTOR RECOMMENDATION 9
Currently on 2L nc, saturating well, no noted desaturations on overnight pulse ox in RCU. ABG in am 7.4/40/127. Currently off BIPAP qHS as OHS likely resolved in setting of significant in hospital weight loss.   - Wean off nasal cannula as tolerated  - Can monitor of NIPPV for now    Edison Castillo MD  Pulmonary & Critical Care Fellow  [phone]
status post treatment by infectious disease  now off of isolation

## 2021-03-05 NOTE — PROGRESS NOTE ADULT - SUBJECTIVE AND OBJECTIVE BOX
Patient is a 60y old  Female who presents with a chief complaint of Transfer from [facility] (05 Mar 2021 09:42)      HPI:  Patient is a 59yo F w/ AARON, peritonitis s/p Oral's procedure and ileostomy (reversed ), HTN, prior DVT/PE, ?Asthma admitted on 2020 to [facility] after presenting for  productive cough  w/ SOB x9 days and diarrhea x7 days and fever x1 day. Patient was found to be COVID + on 2020 and completed remdesivir -. Patient was intubated for airway protection on 2020. Patient has required proning (last 2020). Hospital course c/b by ATN, requiring CVVHD now on intermittent HD, unresponsive to bumex challenge last HD . Course also c/b Stenotrophomonas (, completed Levaquin x7 days), MSSA/P. mirablis bacteremia (on Cefazolin, potentially due to urine vs line-associated and MSSA in sputum due to PNA), also with lung abscesses on CT chest on .  Patient currently on Volume AC 30/350/8/35% on Precedex. Patient intermittently opens eyes but is not yet responsive. Transferred over to [place] on 12/10 to offload [facility] COVID ICU    (10 Dec 2020 14:13)    + bm today. Denies abd pain or nausea. states she wants to go to rehab.     REVIEW OF SYSTEMS  Constitutional - No fever, No weight loss, No fatigue  HEENT - No eye pain, No visual disturbances, No difficulty hearing, No tinnitus, No vertigo, No neck pain  Respiratory - No cough, No wheezing, No shortness of breath  Cardiovascular - No chest pain, No palpitations  Gastrointestinal - No abdominal pain, No nausea, No vomiting, No diarrhea, No constipation  Genitourinary - No dysuria, No frequency, No hematuria, No incontinence  Neurological - No headaches, No memory loss, + loss of strength, No numbness, No tremors  Skin - No itching, No rashes, No lesions   Endocrine - No temperature intolerance  Musculoskeletal - No joint pain, No joint swelling, No muscle pain  Psychiatric - No depression, No anxiety    PAST MEDICAL & SURGICAL HISTORY  Pneumomediastinum    Umbilical hernia    Osteoarthritis of both knees, unspecified osteoarthritis type    AARON (Obstructive Sleep Apnea)    Pneumonia    S/P Colon Resection    pt with allergies to perfumes and addidtive to soaps, requires hypoallergenic sheets and gowns, line    Cervical Dysplasia    Latex Sensitivity    GERD (Gastroesophageal Reflux Disease)    Asthma    Anemia    S/P Joint Replacement    DVT (Deep Venous Thrombosis)    HTN (Hypertension)    H/O ileostomy    S/P LEEP    S/P Exploratory Laparotomy    S/P Colonoscopy    S/P Endoscopy    S/P  Section    History of Tubal Ligation    S/P Knee Surgery    Umbilical Hernia    Osteoarthritis of Knee    HTN (Hypertension)      CURRENT FUNCTIONAL STATUS  3/4  Pt performs bed mobility with maximum assistance x2, slight posterior lean secondary to fear of falling and impaired postural control. Therapeutic exercise performed as described in assessment.    FAMILY HISTORY   Family history of gastric cancer  Family history of breast cancer (Grandparent)        RECENT LABS/IMAGING  CBC Full  -  ( 05 Mar 2021 06:47 )  WBC Count : 8.61 K/uL  RBC Count : 4.17 M/uL  Hemoglobin : 11.1 g/dL  Hematocrit : 38.1 %  Platelet Count - Automated : 498 K/uL  Mean Cell Volume : 91.4 fL  Mean Cell Hemoglobin : 26.6 pg  Mean Cell Hemoglobin Concentration : 29.1 gm/dL  Auto Neutrophil # : x  Auto Lymphocyte # : x  Auto Monocyte # : x  Auto Eosinophil # : x  Auto Basophil # : x  Auto Neutrophil % : x  Auto Lymphocyte % : x  Auto Monocyte % : x  Auto Eosinophil % : x  Auto Basophil % : x    03    138  |  102  |  11  ----------------------------<  114<H>  5.2   |  23  |  0.38<L>    Ca    10.1      05 Mar 2021 06:47          VITALS  T(C): 36.7 (21 @ 05:53), Max: 36.8 (21 @ 21:15)  HR: 84 (21 @ 05:53) (81 - 91)  BP: 149/74 (21 @ 05:53) (122/66 - 149/74)  RR: 16 (21 @ 05:53) (16 - 18)  SpO2: 97% (21 @ 05:53) (97% - 99%)  Wt(kg): --    ALLERGIES  Kiwi (Unknown)  latex (Anaphylaxis)  latex (Unknown)  penicillin (Other)  penicillin (Unknown)  perfume  hives (Other)  potassium acetate (Other)  soap additives hives (Other)      MEDICATIONS   acetaminophen    Suspension .. 650 milliGRAM(s) Oral every 6 hours PRN  ALBUTerol    90 MICROgram(s) HFA Inhaler 2 Puff(s) Inhalation every 6 hours  amLODIPine   Tablet 5 milliGRAM(s) Oral daily  artificial tears (preservative free) Ophthalmic Solution 1 Drop(s) Both EYES every 8 hours PRN  chlorhexidine 4% Liquid 1 Application(s) Topical daily  cholestyramine Powder (Sugar-Free) 4 Gram(s) Oral two times a day  clonazePAM  Tablet 0.5 milliGRAM(s) Oral daily  collagenase Ointment 1 Application(s) Topical two times a day  dextrose 40% Gel 15 Gram(s) Oral once  dextrose 5%. 1000 milliLiter(s) IV Continuous <Continuous>  dextrose 5%. 1000 milliLiter(s) IV Continuous <Continuous>  dextrose 5%. 1000 milliLiter(s) IV Continuous <Continuous>  dextrose 50% Injectable 25 Gram(s) IV Push once  dextrose 50% Injectable 12.5 Gram(s) IV Push once  dextrose 50% Injectable 25 Gram(s) IV Push once  diphenoxylate/atropine 1 Tablet(s) Oral four times a day  glucagon  Injectable 1 milliGRAM(s) IntraMuscular once  guaiFENesin   Syrup  (Sugar-Free) 100 milliGRAM(s) Oral every 6 hours PRN  insulin lispro (ADMELOG) corrective regimen sliding scale   SubCutaneous Before meals and at bedtime  lactobacillus acidophilus 1 Tablet(s) Oral two times a day  metoprolol tartrate 50 milliGRAM(s) Oral two times a day  multivitamin 1 Tablet(s) Oral daily  nystatin Powder 1 Application(s) Topical every 8 hours  pantoprazole  Injectable 40 milliGRAM(s) IV Push daily  psyllium Powder 1 Packet(s) Oral two times a day  rivaroxaban 20 milliGRAM(s) Oral with dinner  Viokase 10,440 units 1 Tablet(s) 1 Tablet(s) Enteral Tube once  zinc sulfate 220 milliGRAM(s) Oral daily      ----------------------------------------------------------------------------------------   PHYSICAL EXAM  Constitutional - NAD, Comfortable, healing chin wound  HEENT - NCAT, EOMI  + nasal cannula,  dressing over decannulated trach site  Chest - no respiratory distress  Cardiovascular - RRR, S1S2   Abdomen -  Soft, NTND   Extremities - No C/C/E, No calf tenderness   Neurologic Exam -                    Cognitive - Awake, Alert, AAO to self, place, date, year, situation     Communication - Fluent, No dysarthria     Cranial Nerves - CN 2-12 intact     Motor -                      LEFT    UE - ShAB 4/5, EF 1/5, EE 3/5, WE 0/5,  0/5                    RIGHT UE - ShAB 4/5, EF 2/5, EE 3/5, WE 1/5,  2/5                    LEFT    LE - HF 2/5, KE 3/5, DF 1/5, PF 4/5                    RIGHT LE - HF 4/5, KE 3/5, DF 4/5, PF 4/5        Sensory - Intact to LT         Balance - WNL Static  Psychiatric - Mood stable, Affect WNL  ----------------------------------------------------------------------------------------  ASSESSMENT/PLAN  Patient is a 59yo F w/ AARON, peritonitis s/p Oral's procedure and ileostomy (reversed ), HTN, prior DVT/PE, ?Asthma admitted on 2020 to [facility],  transferred to [facility] for AHRF second to COVID 19, intubated on  complicated by R lung abscesses, multibacterial PNA and bacteremia, ARTEMIO s/p CRRT and HD, and s/p Tracheostomy .  now off contact precautions for c diff. formed bowel movements, no diarrhea. on lomotil  on Klonopin for anxiety per behavioral health  Pain - tylenol prn  htn: norvasc, lopressor  DVT PPX - rivaroxaban 20 milliGRAM(s) Oral with dinner  Diet:  dysphagia 2 mech soft thin liqids  continue bedside PT and OT  SLP  OOB as tolerates  turn and position q 2  Rehab -     Recommend acute inpatient rehab when medically cleared  patient can tolerate 3 hrs/day of therapy

## 2021-03-05 NOTE — PROGRESS NOTE ADULT - SUBJECTIVE AND OBJECTIVE BOX
Chief Complaint:  Patient is a 60y old  Female who presents with a chief complaint of Transfer from [facility] (05 Mar 2021 12:54)      Interval Events:   no diarrhea  no abdominal pain  eating well    Allergies:  Kiwi (Unknown)  latex (Anaphylaxis)  latex (Unknown)  penicillin (Other)  penicillin (Unknown)  perfume  hives (Other)  potassium acetate (Other)  soap additives hives (Other)      Hospital Medications:  acetaminophen    Suspension .. 650 milliGRAM(s) Oral every 6 hours PRN  ALBUTerol    90 MICROgram(s) HFA Inhaler 2 Puff(s) Inhalation every 6 hours  amLODIPine   Tablet 5 milliGRAM(s) Oral daily  artificial tears (preservative free) Ophthalmic Solution 1 Drop(s) Both EYES every 8 hours PRN  chlorhexidine 4% Liquid 1 Application(s) Topical daily  cholestyramine Powder (Sugar-Free) 4 Gram(s) Oral two times a day  clonazePAM  Tablet 0.5 milliGRAM(s) Oral daily  collagenase Ointment 1 Application(s) Topical two times a day  dextrose 40% Gel 15 Gram(s) Oral once  dextrose 5%. 1000 milliLiter(s) IV Continuous <Continuous>  dextrose 5%. 1000 milliLiter(s) IV Continuous <Continuous>  dextrose 5%. 1000 milliLiter(s) IV Continuous <Continuous>  dextrose 50% Injectable 25 Gram(s) IV Push once  dextrose 50% Injectable 12.5 Gram(s) IV Push once  dextrose 50% Injectable 25 Gram(s) IV Push once  diphenoxylate/atropine 1 Tablet(s) Oral four times a day PRN  glucagon  Injectable 1 milliGRAM(s) IntraMuscular once  guaiFENesin   Syrup  (Sugar-Free) 100 milliGRAM(s) Oral every 6 hours PRN  insulin lispro (ADMELOG) corrective regimen sliding scale   SubCutaneous Before meals and at bedtime  lactobacillus acidophilus 1 Tablet(s) Oral two times a day  metoprolol tartrate 50 milliGRAM(s) Oral two times a day  multivitamin 1 Tablet(s) Oral daily  nystatin Powder 1 Application(s) Topical every 8 hours  pantoprazole  Injectable 40 milliGRAM(s) IV Push daily  rivaroxaban 20 milliGRAM(s) Oral with dinner  Viokase 10,440 units 1 Tablet(s) 1 Tablet(s) Enteral Tube once  zinc sulfate 220 milliGRAM(s) Oral daily      PMHX/PSHX:  Pneumomediastinum    Umbilical hernia    Osteoarthritis of both knees, unspecified osteoarthritis type    AARON (Obstructive Sleep Apnea)    Pneumonia    S/P Colon Resection    pt with allergies to perfumes and addidtive to soaps, requires hypoallergenic sheets and gowns, line    Cervical Dysplasia    Latex Sensitivity    GERD (Gastroesophageal Reflux Disease)    Asthma    Anemia    S/P Joint Replacement    DVT (Deep Venous Thrombosis)    HTN (Hypertension)    H/O ileostomy    S/P LEEP    S/P Exploratory Laparotomy    S/P Colonoscopy    S/P Endoscopy    S/P  Section    History of Tubal Ligation    S/P Knee Surgery    Umbilical Hernia    Osteoarthritis of Knee    HTN (Hypertension)        Family history:  Family history of gastric cancer    Family history of breast cancer (Grandparent)        ROS:     General:  No wt loss, fevers, chills, night sweats, fatigue,   Eyes:  Good vision, no reported pain  ENT:  No sore throat, pain, runny nose, dysphagia  CV:  No pain, palpitations, hypo/hypertension  Resp:  No dyspnea, cough, tachypnea, wheezing  GI:  See HPI  :  No pain, bleeding, incontinence, nocturia  Muscle:  No pain, weakness  Neuro:  + weakness, no tingling, memory problems  Psych:  No fatigue, insomnia, mood problems, depression  Endocrine:  No polyuria, polydipsia, cold/heat intolerance  Heme:  No petechiae, ecchymosis, easy bruisability  Skin:  No rash, edema      PHYSICAL EXAM:     GENERAL:  Appears stated age, well-groomed, well-nourished, no distress  HEENT:  NC/AT,  conjunctivae clear, sclera-anicteric  NECK: Trachea midline, supple  CHEST:  Full & symmetric excursion, no increased effort, breath sounds clear  HEART:  Regular rhythm, no lola/heave  ABDOMEN:  Soft, non-tender, non-distended, normoactive bowel sounds,  no masses ,no hepato-splenomegaly, soft reducible hernia, gastrostomy stoma closed  EXTREMITIES:  no cyanosis,clubbing or edema, contracture LUE  SKIN:  No rash/erythema/petechiae, no jaundice  NEURO:  Alert, oriented, no asterixis, generalized weakness  RECTAL: Deferred    Vital Signs:  Vital Signs Last 24 Hrs  T(C): 36.7 (05 Mar 2021 05:53), Max: 36.8 (04 Mar 2021 21:15)  T(F): 98 (05 Mar 2021 05:53), Max: 98.2 (04 Mar 2021 21:15)  HR: 84 (05 Mar 2021 05:53) (81 - 91)  BP: 149/74 (05 Mar 2021 05:53) (126/68 - 149/74)  BP(mean): --  RR: 16 (05 Mar 2021 05:53) (16 - 16)  SpO2: 97% (05 Mar 2021 05:53) (97% - 98%)  Daily     Daily     LABS:                        11.1   8.61  )-----------( 498      ( 05 Mar 2021 06:47 )             38.1     03-05    138  |  102  |  11  ----------------------------<  114<H>  5.2   |  23  |  0.38<L>    Ca    10.1      05 Mar 2021 06:47                Imaging:

## 2021-03-05 NOTE — CONSULT NOTE ADULT - SUBJECTIVE AND OBJECTIVE BOX
CHIEF COMPLAINT:    HPI: Patient is a 59 yo F w/ HTN, PE/DVT (on Xarelto), Peritonitis s/p Oral's Procedure and Ileostomy (reversed ), OHS  (previously on BIPAP 16/9 QHS) who has had long hosptial course for COVID ARDS (20 admit, 20 intubated, 20 trach, 3/1/21 trach decannulated) with course c/b ARF now resolved, pulmonary abscesses in setting of MSSA bacteremia, Stenotrophomonas PNA,  Pseudomonas PNA, C Diff now transferred out of [facility], awaiting dc to rehab.     Currently on 2L nc, saturating well, no noted desaturations on overnight pulse ox in RCU. ABG in am 7.4/40/127. Currently off BIPAP qHS as OHS likely resolved in setting of significant in hospital weight loss.     Patient feels well, denies any fevers, chills, nausea, CP, SOB, cough. Endorses last episode of diarrhea yesterday with no BM yet. Endorses generalized weakness slowly improving.     PAST MEDICAL & SURGICAL HISTORY:  Pneumomediastinum    Umbilical hernia  Reduciable    Osteoarthritis of both knees, unspecified osteoarthritis type    AARON (Obstructive Sleep Apnea)  category II pt uses c/pap pt to bering to hospital/  OR booking notified    Pneumonia      GERD (Gastroesophageal Reflux Disease)    Asthma  diagnosed   on adbvair denies attacks    DVT (Deep Venous Thrombosis)  left leg 6 m s/p knee replacement in     HTN (Hypertension)    H/O ileostomy  Ileostomy Revesal     S/P LEEP      S/P Exploratory Laparotomy    peritonitis  s/p right knee replacement/ colon resection and ileostomy    S/P Colonoscopy      S/P Endoscopy   gerd    S/P  Section      History of Tubal Ligation  s/p c/section     S/P Knee Surgery  2010        FAMILY HISTORY:  Family history of gastric cancer    Family history of breast cancer (Grandparent)        SOCIAL HISTORY:  Smoking: [ ] Never Smoked [ ] Former Smoker (__ packs x ___ years) [ ] Current Smoker  (__ packs x ___ years)  Substance Use: [ ] Never Used [ ] Used ____  EtOH Use:  Marital Status: [ ] Single [ ]  [ ]  [ ]   Sexual History:   Occupation:  Recent Travel:  Country of Birth:  Advance Directives:    Allergies    Kiwi (Unknown)  latex (Anaphylaxis)  latex (Unknown)  penicillin (Other)  penicillin (Unknown)  perfume  hives (Other)  potassium acetate (Other)  soap additives hives (Other)    Intolerances        HOME MEDICATIONS:  Home Medications:  acetaminophen 160 mg/5 mL oral suspension: 20.31 milliliter(s) orally every 6 hours, As needed, Temp greater or equal to 38C (100.4F) (10 Dec 2020 12:59)  albuterol 90 mcg/inh inhalation aerosol: 4 puff(s) inhaled every 6 hours (10 Dec 2020 12:59)  ceFAZolin 2 g intravenous injection: 2 gram(s) intravenous every 24 hours (10 Dec 2020 12:59)  dexmedetomidine:  (10 Dec 2020 12:59)  heparin:  (10 Dec 2020 12:59)  insulin isophane (NPH) 100 units/mL human recombinant subcutaneous suspension: 10 unit(s) subcutaneous every 6 hours (10 Dec 2020 12:59)  insulin lispro 100 units/mL injectable solution:  injectable  (10 Dec 2020 12:)  ipratropium CFC free 17 mcg/inh inhalation aerosol: 2 puff(s) inhaled every 6 hours (10 Dec 2020 12:59)  lactulose 10 g/15 mL oral syrup: 22.5 milliliter(s) orally 2 times a day (10 Dec 2020 12:59)  midodrine 10 mg oral tablet: 3 tab(s) orally every 8 hours (10 Dec 2020 12:59)  pantoprazole 40 mg intravenous injection: 40 milligram(s) intravenous once a day (10 Dec 2020 12:59)  polyethylene glycol 3350 oral powder for reconstitution: 17 gram(s) orally every 12 hours (10 Dec 2020 12:59)      REVIEW OF SYSTEMS:  Constitutional: [ ] negative [ ] fevers [ ] chills [ ] weight loss [ ] weight gain  HEENT: [ ] negative [ ] dry eyes [ ] eye irritation [ ] postnasal drip [ ] nasal congestion  CV: [ ] negative  [ ] chest pain [ ] orthopnea [ ] palpitations [ ] murmur  Resp: [ ] negative [ ] cough [ ] shortness of breath [ ] dyspnea [ ] wheezing [ ] sputum [ ] hemoptysis  GI: [ ] negative [ ] nausea [ ] vomiting [X] diarrhea [ ] constipation [ ] abd pain [ ] dysphagia   : [ ] negative [ ] dysuria [ ] nocturia [ ] hematuria [ ] increased urinary frequency  Musculoskeletal: [ ] negative [ ] back pain [ ] myalgias [ ] arthralgias [ ] fracture  Skin: [ ] negative [ ] rash [ ] itch  Neurological: [ ] negative [ ] headache [ ] dizziness [ ] syncope [ ] weakness [ ] numbness  Psychiatric: [ ] negative [ ] anxiety [ ] depression  Endocrine: [ ] negative [ ] diabetes [ ] thyroid problem  Hematologic/Lymphatic: [ ] negative [ ] anemia [ ] bleeding problem  Allergic/Immunologic: [ ] negative [ ] itchy eyes [ ] nasal discharge [ ] hives [ ] angioedema  [X] All other systems negative  [ ] Unable to assess ROS because ________    OBJECTIVE:  ICU Vital Signs Last 24 Hrs  T(C): 36.7 (05 Mar 2021 05:53), Max: 36.8 (04 Mar 2021 21:15)  T(F): 98 (05 Mar 2021 05:53), Max: 98.2 (04 Mar 2021 21:15)  HR: 84 (05 Mar 2021 05:53) (81 - 91)  BP: 149/74 (05 Mar 2021 05:53) (122/66 - 149/74)  BP(mean): --  ABP: --  ABP(mean): --  RR: 16 (05 Mar 2021 05:53) (16 - 18)  SpO2: 97% (05 Mar 2021 05:53) (97% - 99%)        03-04 @ 07:01  -  03-05 @ 07:00  --------------------------------------------------------  IN: 180 mL / OUT: 900 mL / NET: -720 mL      CAPILLARY BLOOD GLUCOSE      POCT Blood Glucose.: 141 mg/dL (05 Mar 2021 08:54)      PHYSICAL EXAM:  General: NAD, cheerful  HEENT: EOMI, PERRLA  Respiratory: CTAB  Cardiovascular: S1S2, RRR  Abdomen: Soft, NTND, BS+  Extremities: No edema    LINES:     HOSPITAL MEDICATIONS:  Standing Meds:  ALBUTerol    90 MICROgram(s) HFA Inhaler 2 Puff(s) Inhalation every 6 hours  amLODIPine   Tablet 5 milliGRAM(s) Oral daily  chlorhexidine 4% Liquid 1 Application(s) Topical daily  cholestyramine Powder (Sugar-Free) 4 Gram(s) Oral two times a day  clonazePAM  Tablet 0.5 milliGRAM(s) Oral daily  collagenase Ointment 1 Application(s) Topical two times a day  dextrose 40% Gel 15 Gram(s) Oral once  dextrose 5%. 1000 milliLiter(s) IV Continuous <Continuous>  dextrose 5%. 1000 milliLiter(s) IV Continuous <Continuous>  dextrose 5%. 1000 milliLiter(s) IV Continuous <Continuous>  dextrose 50% Injectable 25 Gram(s) IV Push once  dextrose 50% Injectable 12.5 Gram(s) IV Push once  dextrose 50% Injectable 25 Gram(s) IV Push once  diphenoxylate/atropine 1 Tablet(s) Oral four times a day  glucagon  Injectable 1 milliGRAM(s) IntraMuscular once  insulin lispro (ADMELOG) corrective regimen sliding scale   SubCutaneous Before meals and at bedtime  lactobacillus acidophilus 1 Tablet(s) Oral two times a day  metoprolol tartrate 50 milliGRAM(s) Oral two times a day  multivitamin 1 Tablet(s) Oral daily  nystatin Powder 1 Application(s) Topical every 8 hours  pantoprazole  Injectable 40 milliGRAM(s) IV Push daily  psyllium Powder 1 Packet(s) Oral two times a day  rivaroxaban 20 milliGRAM(s) Oral with dinner  Viokase 10,440 units 1 Tablet(s) 1 Tablet(s) Enteral Tube once  zinc sulfate 220 milliGRAM(s) Oral daily      PRN Meds:  acetaminophen    Suspension .. 650 milliGRAM(s) Oral every 6 hours PRN  artificial tears (preservative free) Ophthalmic Solution 1 Drop(s) Both EYES every 8 hours PRN  guaiFENesin   Syrup  (Sugar-Free) 100 milliGRAM(s) Oral every 6 hours PRN      LABS:                        11.1   8.61  )-----------( 498      ( 05 Mar 2021 06:47 )             38.1     Hgb Trend: 11.1<--, 10.9<--, 11.4<--, 11.4<--  03-05    138  |  102  |  11  ----------------------------<  114<H>  5.2   |  23  |  0.38<L>    Ca    10.1      05 Mar 2021 06:47      Creatinine Trend: 0.38<--, 0.41<--, 0.37<--, 0.34<--, 0.28<--, 0.29<--      Arterial Blood Gas:   @ 13:44  7.41/40/127/25/98.7/0.6  ABG lactate: --        MICROBIOLOGY:       RADIOLOGY:  [ ] Reviewed and interpreted by me    PULMONARY FUNCTION TESTS:    EKG:

## 2021-03-05 NOTE — CONSULT NOTE ADULT - ASSESSMENT
60 year old Female with a PMH of HTN, DVT on Xarelto, peritonitis s/p Oral's procedure and ileostomy (reversed 2011), AARON who presented to [facility] on 11/18  w/ fevers found to have COVID-19, intubated 11/23, course complicated by superimposed PNA and bacteremia with Stenotrophomonas (12/11, completed Levaquin x7 days), MSSA/P. mirablis bacteremia (on Cefazolin, potentially due to urine vs line-associated and MSSA in sputum due to PNA), also with lung abscesses on CT chest on 12/5 , ARTEMIO/ATN requiring CRRT and HD. Now unable to wean from ventilator and transferred from [facility] on 12/10 to offload COVID unit. Unable to wean from ventilator, s/p tracheostmy with ctsx 12/18 planned for PEG 12/24 but unable to perform due to large ventral hernia.  - Will plan for percutaneous gastrostomy tube placement in IR with anesthesia  - Pt has NG tube  - NPO after midnight    [id number]
Assessment:   Patient is a 61yo F patient being seen by private podiatry attending for the following:  - Elongated nails x10  - Pain in the right toes 1-5  - Pain in the left toes 1-5    Plan:  The patient was evaluated and medical records were reviewed.  The patient showed verbal understanding with regard to their evaluation as well as the treatment plan.  After thorough examination of patient, aseptic mechanical debridement of toenails x 10 was performed using sterile nail clippers. Alcohol prep was used to sterilize the toenails before and after debridement of nails.   Patient tolerated intervention well without any complications  I discussed the importance of daily foot examinations and proper shoe gear with the patient at length.    Patient may follow up in my office upon discharge (Dr. Waterhouse) - [phone]  Patient demonstrated verbal understanding of all interventions and all questions were answered appropriately.  No further interventions by Podiatry at this time.   Please re-consult as needed.  Thank you for the consultation.
Patient is a 59 yo F w/ HTN, PE/DVT (on Xarelto), Peritonitis s/p Oral's Procedure and Ileostomy (reversed 2011), OHS  (previously on BIPAP 16/9 QHS) who has had long hosptial course for COVID ARDS (11/18/20 admit, 11/23/20 intubated, 12/18/20 trach, 3/1/21 trach decannulated) with course c/b ARF now resolved, pulmonary abscesses in setting of MSSA bacteremia, Stenotrophomonas PNA,  Pseudomonas PNA, C Diff now transferred out of RCU, awaiting dc to rehab.     Currently on 2L nc, saturating well, no noted desaturations on overnight pulse ox in RCU. ABG in am 7.4/40/127. Currently off BIPAP qHS as OHS likely resolved in setting of significant in hospital weight loss. 
60 year old female with prolonged hospital course secondary to COVID 19, GI consulted for persistent diarrhea
60 year old female with a 4cm x 2cm pressure ulcer/eschar of the chin due to positioning from having COVID and being prone. The eschar is not infected and it is dry. There is no evidence of any erythema or drainage at this time.  The patient is very cachectic and agitated at this time. There is a small eschar 1cm over the left medial cheek region with no infection noted.     Recommendations:)    1.) Santyl to both wounds to soften up the eschar. Apply 2x/day, then cover with telfa and 4x4 Gauze pad please  2.) Would continue Santyl for 1 week and the re-assess the wound  3.) No infection wound not perform any debridement at this time due to the patient's condition  4.) Improve nutrition please with increased Protein intact  5.) Avoid patient's chin to bend down and touch her chest. Use Biatin patch/pad if possible    Will follow up with the wound in 1-2 weeks to re-evaluate wound care
59 yo woman with AARON, h/o Lockett's procedure, s/p reversal 2011 who was admitted to [facility] 11/17 with covid pneumonia.  She received course of remdesevir and dexamethasone; required intubation 11/23 for acute hypoxemic respiratory failure. Hospital course complicated by Proteus bacteremia, MSSA bacteremia/ pneumonia, and Stenotrophomonas respiratory infection.   as well as ATN requiring CVVHD now HD.  CT chest 12/4 new large consolidation with cavitation in right lung.  Patient transferred [facility] 12/10; spiked high fever; antibiotics broadened to vanco x 1 and meropenem 500 mg iv 24h.  Blood culture 11/27 MSSA and proteus; 11/28 MSSA.  Bacteremia cleared.   Blood cultures 12/1, 12/11 no growth.  Sputum culture 12/1 and 12/12 Stenotrophomonas.    Multifocal covid pneumonia with respiratory failure, MSSA bacteremia 11/27 with cavitary pneumonia, Proteus bacteremia 11/27, Stenotrophomonas pneumonia vs colonization, ATN on HD now with high fever.  Reasonable to broaden antibiotics while await cultures.  Favor Imipenem over meropenem for MSSA coverage.  Would d/c meropenem.  Start Imipenem 250 mg iv q 12 h while on HD.  Will need IV antibiotic for MSSA bacteremia thru 12/28 (4 weeks) - may be able to de-escalate to cefazolin next week to complete course of tx.    Mila Burgos MD  Pager: [phone]  After 5 PM or weekends please call fellow on call or office [phone]          
ASSESSMENT: Patient is a 60F pmhx AARON, HTN, DVT/PE in the past, asthma, h/o SBO 2/2 incarcerated ventral hernia s/p exlap SBR (2010), hemicolectomy and ileostomy & reversal, now admitted for a prolonged COVID course, intubated >1 month, recently s/p tracheostomy. Surgery being consulted for feeding access.    PLAN:   - No surgical interventions indicated at this time  - Given patient's extensive ventral hernias, any abdominal operation would be a big undertaking with significant risks  - As patient is currently tolerating NGT feeds at goal, there is not much added benefit from a surgical g-tube, and it seems the risks of such an operation would outweigh the benefits  - We recommend continuing NG tube feeds   - Consider exchanging current salem-sump NG tube for a less traumatic kiera feeding tube    Discussed with Dr. Arcenio Garay, PGY-4  A Team Surgery [id number]

## 2021-03-05 NOTE — PROGRESS NOTE ADULT - ASSESSMENT
60 year old female with prolonged hospital course secondary to COVID 19, GI consulted for persistent diarrhea     Problem/Recommendation - 1:  Problem: COVID-19. Recommendation: status post treatment by infectious disease  now off of isolation.  pending dispo to acute rehab.     Problem/Recommendation - 2:  ·  Problem: Respiratory failure.  Recommendation: status post tracheostomy, now decannulated, on room air.     Problem/Recommendation - 3:  ·  Problem: Diarrhea.  Recommendation: now improved, was most likely due to tube feeds. Now tolerating regular food.  -will scale back lomotil to PRN as it is unlikely to be necessary at this time, will continue questran but can titrate as needed.     Problem/Recommendation - 4:  ·  Problem: Hernia, incisional.  Recommendation: reducible, nontender. No signs of incarceration.   -serial abdominal exams.      Problem/Recommendation - 5:  ·  Problem: Gastrostomy present.  Recommendation: patient tolerating good PO intake. Passed swallow eval. Gastrostomy removed at bedside and bandaged.     Problem/Recommendation - 6:  Problem: ARTEMIO (acute kidney injury). Recommendation: s/p CVVHD, now improved.     Problem/Recommendation - 7:  Problem: History of pressure ulcer. Recommendation: per RCU team.     Problem/Recommendation - 8:  Problem: Clostridium difficile infection. Recommendation: status post 2 weeks of PO vanco  -vanco per ID on hold.    Attending Attestation:   Differential diagnosis and plan of care discussed with patient after the evaluation  75 Minutes spent on total encounter of which more than fifty percent of the encounter was spent counseling and/or coordinating care by the attending physician.      Ranjeet Ponce M.D.   Gastroenterology and Hepatology  Cell: [phone].

## 2021-03-05 NOTE — CONSULT NOTE ADULT - CONSULT REQUESTED BY NAME
Shaan
dr. Garduno
Keenan Lynn
Medicine
Dr. Garduno
Kaylin Garduno
Medicine
medicine
Medicine NP
Dr. Johnson

## 2021-03-05 NOTE — PROGRESS NOTE ADULT - ASSESSMENT
61 YO F with PMHx of HTN, DVT on Xarelto, Peritonitis s/p Oral's Procedure and Ileostomy (reversed in 2011) and AARON who presented to [facility] on 11/18 for AHRF second to COVID 19, intubated on 11/23 complicated by lung abscesses on CT CHEST 12/5, multi-bacterial PNA and bacteremia, ARTEMIO s/p CRRT and HD, and s/p Tracheostomy 12/18. Transferred to RCU for further management, now transferred to .         # CDIFF (+)   - s/p PO Vancomycin 2/1-2/25  - Stool O&P and GI PCR NGTD, c/w Lomotil QID   -stool more formed as of 3/4, will need at least 48 hrs of formed stool prior to DC. Earliest pt would be able to dc'd is Saturday 3/6      # ARDS second to COVID vs Multi-bacterial PNA/ R Lung Abscess  - s/p Intubated on 11/23 and c/b multi-bacterial PNA and lung abscesses.   - s/p Tracheostomy on 12/18 by CTSx. Sutures out 1/1  - s/p Air leak noted and s/p Bivona with CTSx 12/31. s/p Downsized to 6DCT 2/25  - s/p Successfully decannulated on 3/1, Now on 2LNC saturating well  - Proventil and Chest PT q6hr PRN      # ARTEMIO s/p CRRT and HD   - ARTEMIO now resolved and no longer required HD.   - Hypokalemia in setting of diarrhea/ CDIFF. Monitor electrolytes.   - Monitor renal function and avoid nephrotoxins.       # Sepsis second to COVID vs Multi-bacterial PNA/ Bacteremia and R Lung Abscess vs CDIFF (+)   - COVID with superimposed multi-bacterial VAP vs aspiration PNA vs cavitary PNA.   - SCx (11/24) MSSA and BCx (11/27) with MSSA and Proteus Bacteremia  - SCx (12/1, 12/12 and 12/27) with Stenotrophomonas.   - SCx (1/9) with  Pseudomonas and Stenotrophomonas   - Bcx has been persistently negative from 12/1, and most recently 1/12   - CT CHEST 12/4 with right lung cavitation.   - s/p multiple antibiotics, but now completed Fortaz and Flagyl (1/12-1/16)  - RPT CT CHEST 1/28 with improvement of right lung abscess   - SCx with  Pseudomonas and Stenotrophomonas and s/p TED/ Fortaz (1/28-2/6)      # DMII   - Continue on ISS   - FS well controlled      # Hx of DVT   - c/w Xarelto      # Contracted Wrists   - PT/OT working on strength      # CODE STATUS - FULL CODE     # DISPO - PM&R recs ACUTE REHAB       61 YO F with PMHx of HTN, DVT on Xarelto, Peritonitis s/p Oral's Procedure and Ileostomy (reversed in 2011) and AARON who presented to [facility] on 11/18 for AHRF second to COVID 19, intubated on 11/23 complicated by lung abscesses on CT CHEST 12/5, multi-bacterial PNA and bacteremia, ARTEMIO s/p CRRT and HD, and s/p Tracheostomy 12/18. Transferred to RCU for further management, now transferred to . Pt now decannulated and PEG removed, eating dysphagia diet, pending dc to acute rehab.         # CDIFF (+)   - s/p PO Vancomycin 2/1-2/25  - Stool O&P and GI PCR NGTD, c/w Lomotil QID   -stool more formed as of 3/4, will need at least 48 hrs of formed stool prior to DC. Earliest pt would be able to dc'd is Saturday 3/6      # ARDS second to COVID vs Multi-bacterial PNA/ R Lung Abscess  - s/p Intubated on 11/23 and c/b multi-bacterial PNA and lung abscesses.   - s/p Tracheostomy on 12/18 by CTSx. Sutures out 1/1  - s/p Air leak noted and s/p Bivona with CTSx 12/31. s/p Downsized to 6DCT 2/25  - s/p Successfully decannulated on 3/1, Now on 2LNC saturating well  - Proventil and Chest PT q6hr PRN      # ARTEMIO s/p CRRT and HD   - ARTEMIO now resolved and no longer required HD.   - Hypokalemia in setting of diarrhea/ CDIFF. Monitor electrolytes.   - Monitor renal function and avoid nephrotoxins.       # Sepsis second to COVID vs Multi-bacterial PNA/ Bacteremia and R Lung Abscess vs CDIFF (+)   - COVID with superimposed multi-bacterial VAP vs aspiration PNA vs cavitary PNA.   - SCx (11/24) MSSA and BCx (11/27) with MSSA and Proteus Bacteremia  - SCx (12/1, 12/12 and 12/27) with Stenotrophomonas.   - SCx (1/9) with  Pseudomonas and Stenotrophomonas   - Bcx has been persistently negative from 12/1, and most recently 1/12   - CT CHEST 12/4 with right lung cavitation.   - s/p multiple antibiotics, but now completed Fortaz and Flagyl (1/12-1/16)  - RPT CT CHEST 1/28 with improvement of right lung abscess   - SCx with  Pseudomonas and Stenotrophomonas and s/p TED/ Fortaz (1/28-2/6)      # DMII   - Continue on ISS   - FS well controlled      # Hx of DVT   - c/w Xarelto      # Contracted Wrists   - PT/OT working on strength      # CODE STATUS - FULL CODE     # DISPO - PM&R recs ACUTE REHAB

## 2021-03-05 NOTE — CONSULT NOTE ADULT - CONSULT REASON
Gastrostomy tube placement
Surgical feeding access
Trach/PEG
covid PNA/ respiratory failure/ MSSA bacteremia
eval for rehab
nails elongated painful
COVID
Chin Pressure Ulcer/Eschar
Gastrostomy tube placement
Persistent Diarrhea

## 2021-03-05 NOTE — CONSULT NOTE ADULT - ATTENDING COMMENTS
pt well known to me from previous complicated surgical history in past.   now with severe covid course with resp failure s/p trach.  12/4 CT Abd reviewed appears to be window for perc g tube.  would suggest IR review for possible image guided gtube when stable.  agree with resident note above - switch to KFT for now for feeds.  pt high risk for open procedure - risks likely outweigh benefits at this time.  cont supportive care per MICU/pulm  prognosis poor.   will signoff - reconsult prn.
Patient doing well.  Decannulated and PEG removed.  Awaiting placement to rehab.
Differential diagnosis and plan of care discussed with patient after the evaluation  125 Minutes spent on total encounter of which more than fifty percent of the encounter was spent counseling and/or coordinating care by the attending physician.  Advanced care planning was discussed with the patient and/or surrogate decision makers. Advanced care planning forms were discussed. The risks benefits and alternatives to pertinent gastrointestinal procedures and interventions were discussed in detail and all questions were answered. Duration: 30 Minutes.      Ranjeet Ponce M.D.   Gastroenterology and Hepatology  Cell: [phone]

## 2021-03-05 NOTE — CONSULT NOTE ADULT - REASON FOR ADMISSION
Transfer from [facility]
Transfer from [facility]
Transfer from [facility]
Transfer from [facility]
Transfer from [facility]
Transfer from [facility]
Transfer from [facility]
Transfer from [facility]
Transfer from [facility]
Transfer from [facility]

## 2021-03-06 LAB
ANION GAP SERPL CALC-SCNC: 13 MMOL/L — SIGNIFICANT CHANGE UP (ref 7–14)
BUN SERPL-MCNC: 10 MG/DL — SIGNIFICANT CHANGE UP (ref 7–23)
CALCIUM SERPL-MCNC: 10 MG/DL — SIGNIFICANT CHANGE UP (ref 8.4–10.5)
CHLORIDE SERPL-SCNC: 103 MMOL/L — SIGNIFICANT CHANGE UP (ref 98–107)
CO2 SERPL-SCNC: 21 MMOL/L — LOW (ref 22–31)
CREAT SERPL-MCNC: 0.35 MG/DL — LOW (ref 0.5–1.3)
GLUCOSE SERPL-MCNC: 112 MG/DL — HIGH (ref 70–99)
MAGNESIUM SERPL-MCNC: 1.6 MG/DL — SIGNIFICANT CHANGE UP (ref 1.6–2.6)
PHOSPHATE SERPL-MCNC: 4.4 MG/DL — SIGNIFICANT CHANGE UP (ref 2.5–4.5)
POTASSIUM SERPL-MCNC: 4.8 MMOL/L — SIGNIFICANT CHANGE UP (ref 3.5–5.3)
POTASSIUM SERPL-SCNC: 4.8 MMOL/L — SIGNIFICANT CHANGE UP (ref 3.5–5.3)
SODIUM SERPL-SCNC: 137 MMOL/L — SIGNIFICANT CHANGE UP (ref 135–145)

## 2021-03-06 RX ORDER — MAGNESIUM SULFATE 500 MG/ML
2 VIAL (ML) INJECTION ONCE
Refills: 0 | Status: COMPLETED | OUTPATIENT
Start: 2021-03-06 | End: 2021-03-06

## 2021-03-06 RX ADMIN — NYSTATIN CREAM 1 APPLICATION(S): 100000 CREAM TOPICAL at 12:55

## 2021-03-06 RX ADMIN — CHOLESTYRAMINE 4 GRAM(S): 4 POWDER, FOR SUSPENSION ORAL at 12:54

## 2021-03-06 RX ADMIN — AMLODIPINE BESYLATE 5 MILLIGRAM(S): 2.5 TABLET ORAL at 05:19

## 2021-03-06 RX ADMIN — NYSTATIN CREAM 1 APPLICATION(S): 100000 CREAM TOPICAL at 21:35

## 2021-03-06 RX ADMIN — Medication 1 TABLET(S): at 05:20

## 2021-03-06 RX ADMIN — CHOLESTYRAMINE 4 GRAM(S): 4 POWDER, FOR SUSPENSION ORAL at 21:35

## 2021-03-06 RX ADMIN — ZINC SULFATE TAB 220 MG (50 MG ZINC EQUIVALENT) 220 MILLIGRAM(S): 220 (50 ZN) TAB at 12:54

## 2021-03-06 RX ADMIN — NYSTATIN CREAM 1 APPLICATION(S): 100000 CREAM TOPICAL at 05:21

## 2021-03-06 RX ADMIN — Medication 50 MILLIGRAM(S): at 18:18

## 2021-03-06 RX ADMIN — Medication 1 APPLICATION(S): at 05:20

## 2021-03-06 RX ADMIN — Medication 0.5 MILLIGRAM(S): at 12:54

## 2021-03-06 RX ADMIN — Medication 1 TABLET(S): at 18:17

## 2021-03-06 RX ADMIN — CHLORHEXIDINE GLUCONATE 1 APPLICATION(S): 213 SOLUTION TOPICAL at 12:54

## 2021-03-06 RX ADMIN — Medication 50 MILLIGRAM(S): at 05:19

## 2021-03-06 RX ADMIN — Medication 1 TABLET(S): at 12:54

## 2021-03-06 RX ADMIN — ALBUTEROL 2 PUFF(S): 90 AEROSOL, METERED ORAL at 12:55

## 2021-03-06 RX ADMIN — RIVAROXABAN 20 MILLIGRAM(S): KIT at 18:17

## 2021-03-06 RX ADMIN — Medication 1 APPLICATION(S): at 18:17

## 2021-03-06 RX ADMIN — PANTOPRAZOLE SODIUM 40 MILLIGRAM(S): 20 TABLET, DELAYED RELEASE ORAL at 12:54

## 2021-03-06 RX ADMIN — ALBUTEROL 2 PUFF(S): 90 AEROSOL, METERED ORAL at 21:35

## 2021-03-06 RX ADMIN — Medication 50 GRAM(S): at 18:16

## 2021-03-06 RX ADMIN — Medication 1 DROP(S): at 18:17

## 2021-03-06 RX ADMIN — ALBUTEROL 2 PUFF(S): 90 AEROSOL, METERED ORAL at 05:20

## 2021-03-06 RX ADMIN — Medication 650 MILLIGRAM(S): at 23:03

## 2021-03-06 NOTE — PROGRESS NOTE ADULT - ASSESSMENT
60 year old female with prolonged hospital course secondary to COVID 19, GI consulted for persistent diarrhea     Problem/Recommendation - 1:  Problem: COVID-19. Recommendation: status post treatment by infectious disease  now off of isolation.  pending dispo to acute rehab.     Problem/Recommendation - 2:  ·  Problem: Respiratory failure.  Recommendation: status post tracheostomy, now decannulated, on room air.     Problem/Recommendation - 3:  ·  Problem: Diarrhea.  Recommendation: now improved, was most likely due to tube feeds. Now tolerating regular food.  -will scale back lomotil to PRN as it is unlikely to be necessary at this time, will continue questran but can titrate as needed.     Problem/Recommendation - 4:  ·  Problem: Hernia, incisional.  Recommendation: reducible, nontender. No signs of incarceration.   -serial abdominal exams.      Problem/Recommendation - 5:  ·  Problem: Gastrostomy present.  Recommendation: patient tolerating good PO intake. Passed swallow eval. Gastrostomy removed at bedside and bandaged.     Problem/Recommendation - 6:  Problem: ARTEMIO (acute kidney injury). Recommendation: s/p CVVHD, now improved.     Problem/Recommendation - 7:  Problem: History of pressure ulcer. Recommendation: per RCU team.     Problem/Recommendation - 8:  Problem: Clostridium difficile infection. Recommendation: status post 2 weeks of PO vanco  -vanco per ID on hold.    Attending Attestation:   Differential diagnosis and plan of care discussed with patient after the evaluation  75 Minutes spent on total encounter of which more than fifty percent of the encounter was spent counseling and/or coordinating care by the attending physician.      Ranjeet Ponce M.D.   Gastroenterology and Hepatology  Cell: [phone].

## 2021-03-06 NOTE — PROGRESS NOTE ADULT - SUBJECTIVE AND OBJECTIVE BOX
DATE OF SERVICE: 03-06-21 @ 16:33    Subjective: Patient seen and examined. No new events except as noted.   doing okay     REVIEW OF SYSTEMS:    CONSTITUTIONAL: No weakness, fevers or chills  EYES/ENT: No visual changes;  No vertigo or throat pain   NECK: No pain or stiffness  RESPIRATORY: No cough, wheezing, hemoptysis; No shortness of breath  CARDIOVASCULAR: No chest pain or palpitations  GASTROINTESTINAL: No abdominal or epigastric pain. No nausea, vomiting, or hematemesis; No diarrhea or constipation. No melena or hematochezia.  GENITOURINARY: No dysuria, frequency or hematuria  NEUROLOGICAL: No numbness or weakness  SKIN: No itching, burning, rashes, or lesions   All other review of systems is negative unless indicated above.    MEDICATIONS:  MEDICATIONS  (STANDING):  ALBUTerol    90 MICROgram(s) HFA Inhaler 2 Puff(s) Inhalation every 6 hours  amLODIPine   Tablet 5 milliGRAM(s) Oral daily  chlorhexidine 4% Liquid 1 Application(s) Topical daily  cholestyramine Powder (Sugar-Free) 4 Gram(s) Oral two times a day  clonazePAM  Tablet 0.5 milliGRAM(s) Oral daily  collagenase Ointment 1 Application(s) Topical two times a day  dextrose 40% Gel 15 Gram(s) Oral once  dextrose 5%. 1000 milliLiter(s) (50 mL/Hr) IV Continuous <Continuous>  dextrose 5%. 1000 milliLiter(s) (100 mL/Hr) IV Continuous <Continuous>  dextrose 5%. 1000 milliLiter(s) (75 mL/Hr) IV Continuous <Continuous>  dextrose 50% Injectable 25 Gram(s) IV Push once  dextrose 50% Injectable 12.5 Gram(s) IV Push once  dextrose 50% Injectable 25 Gram(s) IV Push once  glucagon  Injectable 1 milliGRAM(s) IntraMuscular once  insulin lispro (ADMELOG) corrective regimen sliding scale   SubCutaneous Before meals and at bedtime  lactobacillus acidophilus 1 Tablet(s) Oral two times a day  metoprolol tartrate 50 milliGRAM(s) Oral two times a day  multivitamin 1 Tablet(s) Oral daily  nystatin Powder 1 Application(s) Topical every 8 hours  pantoprazole  Injectable 40 milliGRAM(s) IV Push daily  rivaroxaban 20 milliGRAM(s) Oral with dinner  Viokase 10,440 units 1 Tablet(s) 1 Tablet(s) Enteral Tube once  zinc sulfate 220 milliGRAM(s) Oral daily      PHYSICAL EXAM:  T(C): 36.7 (03-06-21 @ 13:21), Max: 36.8 (03-06-21 @ 05:16)  HR: 98 (03-06-21 @ 13:21) (80 - 98)  BP: 147/81 (03-06-21 @ 13:21) (146/60 - 155/83)  RR: 20 (03-06-21 @ 13:21) (16 - 20)  SpO2: 98% (03-06-21 @ 13:21) (88% - 100%)  Wt(kg): --  I&O's Summary    06 Mar 2021 07:01  -  06 Mar 2021 16:33  --------------------------------------------------------  IN: 700 mL / OUT: 0 mL / NET: 700 mL          Appearance: Normal	  HEENT:  PERRLA   Lymphatic: No lymphadenopathy   Cardiovascular: Normal S1 S2, no JVD  Respiratory: normal effort , clear  Gastrointestinal:  Soft, Non-tender  Skin: No rashes,  warm to touch  Psychiatry:  Mood & affect appropriate  Musculuskeletal: No edema      All labs, Imaging and EKGs personally reviewed                             11.1   8.61  )-----------( 498      ( 05 Mar 2021 06:47 )             38.1               03-06    137  |  103  |  10  ----------------------------<  112<H>  4.8   |  21<L>  |  0.35<L>    Ca    10.0      06 Mar 2021 07:37  Phos  4.4     03-06  Mg     1.6     03-06

## 2021-03-06 NOTE — PROGRESS NOTE ADULT - ASSESSMENT
61 YO F with PMHx of HTN, DVT on Xarelto, Peritonitis s/p Oral's Procedure and Ileostomy (reversed in 2011) and AARON who presented to [facility] on 11/18 for AHRF second to COVID 19, intubated on 11/23 complicated by lung abscesses on CT CHEST 12/5, multi-bacterial PNA and bacteremia, ARTEMIO s/p CRRT and HD, and s/p Tracheostomy 12/18. Transferred to RCU for further management, now transferred to . Pt now decannulated and PEG removed, eating dysphagia diet, pending dc to acute rehab.         # CDIFF (+)   - s/p PO Vancomycin 2/1-2/25  - Stool O&P and GI PCR NGTD, c/w Lomotil QID   -stool more formed as of 3/4, will need at least 48 hrs of formed stool prior to DC. Earliest pt would be able to dc'd is Saturday 3/6      # ARDS second to COVID vs Multi-bacterial PNA/ R Lung Abscess  - s/p Intubated on 11/23 and c/b multi-bacterial PNA and lung abscesses.   - s/p Tracheostomy on 12/18 by CTSx. Sutures out 1/1  - s/p Air leak noted and s/p Bivona with CTSx 12/31. s/p Downsized to 6DCT 2/25  - s/p Successfully decannulated on 3/1, Now on 2LNC saturating well  - Proventil and Chest PT q6hr PRN      # ARTEMIO s/p CRRT and HD   - ARTEMIO now resolved and no longer required HD.   - Hypokalemia in setting of diarrhea/ CDIFF. Monitor electrolytes.   - Monitor renal function and avoid nephrotoxins.       # Sepsis second to COVID vs Multi-bacterial PNA/ Bacteremia and R Lung Abscess vs CDIFF (+)   - COVID with superimposed multi-bacterial VAP vs aspiration PNA vs cavitary PNA.   - SCx (11/24) MSSA and BCx (11/27) with MSSA and Proteus Bacteremia  - SCx (12/1, 12/12 and 12/27) with Stenotrophomonas.   - SCx (1/9) with  Pseudomonas and Stenotrophomonas   - Bcx has been persistently negative from 12/1, and most recently 1/12   - CT CHEST 12/4 with right lung cavitation.   - s/p multiple antibiotics, but now completed Fortaz and Flagyl (1/12-1/16)  - RPT CT CHEST 1/28 with improvement of right lung abscess   - SCx with  Pseudomonas and Stenotrophomonas and s/p TED/ Fortaz (1/28-2/6)      # DMII   - Continue on ISS   - FS well controlled      # Hx of DVT   - c/w Xarelto      # Contracted Wrists   - PT/OT working on strength      # CODE STATUS - FULL CODE     # DISPO - PM&R recs ACUTE REHAB

## 2021-03-06 NOTE — PROGRESS NOTE ADULT - SUBJECTIVE AND OBJECTIVE BOX
Chief Complaint:  Patient is a 60y old  Female who presents with a chief complaint of Transfer from [facility] (05 Mar 2021 12:54)      Interval Events:   no diarrhea  no abdominal pain  eating well    Allergies:  Kiwi (Unknown)  latex (Anaphylaxis)  latex (Unknown)  penicillin (Other)  penicillin (Unknown)  perfume  hives (Other)  potassium acetate (Other)  soap additives hives (Other)      Hospital Medications:  acetaminophen    Suspension .. 650 milliGRAM(s) Oral every 6 hours PRN  ALBUTerol    90 MICROgram(s) HFA Inhaler 2 Puff(s) Inhalation every 6 hours  amLODIPine   Tablet 5 milliGRAM(s) Oral daily  artificial tears (preservative free) Ophthalmic Solution 1 Drop(s) Both EYES every 8 hours PRN  chlorhexidine 4% Liquid 1 Application(s) Topical daily  cholestyramine Powder (Sugar-Free) 4 Gram(s) Oral two times a day  clonazePAM  Tablet 0.5 milliGRAM(s) Oral daily  collagenase Ointment 1 Application(s) Topical two times a day  dextrose 40% Gel 15 Gram(s) Oral once  dextrose 5%. 1000 milliLiter(s) IV Continuous <Continuous>  dextrose 5%. 1000 milliLiter(s) IV Continuous <Continuous>  dextrose 5%. 1000 milliLiter(s) IV Continuous <Continuous>  dextrose 50% Injectable 25 Gram(s) IV Push once  dextrose 50% Injectable 12.5 Gram(s) IV Push once  dextrose 50% Injectable 25 Gram(s) IV Push once  diphenoxylate/atropine 1 Tablet(s) Oral four times a day PRN  glucagon  Injectable 1 milliGRAM(s) IntraMuscular once  guaiFENesin   Syrup  (Sugar-Free) 100 milliGRAM(s) Oral every 6 hours PRN  insulin lispro (ADMELOG) corrective regimen sliding scale   SubCutaneous Before meals and at bedtime  lactobacillus acidophilus 1 Tablet(s) Oral two times a day  metoprolol tartrate 50 milliGRAM(s) Oral two times a day  multivitamin 1 Tablet(s) Oral daily  nystatin Powder 1 Application(s) Topical every 8 hours  pantoprazole  Injectable 40 milliGRAM(s) IV Push daily  rivaroxaban 20 milliGRAM(s) Oral with dinner  Viokase 10,440 units 1 Tablet(s) 1 Tablet(s) Enteral Tube once  zinc sulfate 220 milliGRAM(s) Oral daily      PMHX/PSHX:  Pneumomediastinum    Umbilical hernia    Osteoarthritis of both knees, unspecified osteoarthritis type    AARON (Obstructive Sleep Apnea)    Pneumonia    S/P Colon Resection    pt with allergies to perfumes and addidtive to soaps, requires hypoallergenic sheets and gowns, line    Cervical Dysplasia    Latex Sensitivity    GERD (Gastroesophageal Reflux Disease)    Asthma    Anemia    S/P Joint Replacement    DVT (Deep Venous Thrombosis)    HTN (Hypertension)    H/O ileostomy    S/P LEEP    S/P Exploratory Laparotomy    S/P Colonoscopy    S/P Endoscopy    S/P  Section    History of Tubal Ligation    S/P Knee Surgery    Umbilical Hernia    Osteoarthritis of Knee    HTN (Hypertension)        Family history:  Family history of gastric cancer    Family history of breast cancer (Grandparent)        ROS:     General:  No wt loss, fevers, chills, night sweats, fatigue,   Eyes:  Good vision, no reported pain  ENT:  No sore throat, pain, runny nose, dysphagia  CV:  No pain, palpitations, hypo/hypertension  Resp:  No dyspnea, cough, tachypnea, wheezing  GI:  See HPI  :  No pain, bleeding, incontinence, nocturia  Muscle:  No pain, weakness  Neuro:  + weakness, no tingling, memory problems  Psych:  No fatigue, insomnia, mood problems, depression  Endocrine:  No polyuria, polydipsia, cold/heat intolerance  Heme:  No petechiae, ecchymosis, easy bruisability  Skin:  No rash, edema      PHYSICAL EXAM:     GENERAL:  Appears stated age, well-groomed, well-nourished, no distress  HEENT:  NC/AT,  conjunctivae clear, sclera-anicteric  NECK: Trachea midline, supple  CHEST:  Full & symmetric excursion, no increased effort, breath sounds clear  HEART:  Regular rhythm, no lola/heave  ABDOMEN:  Soft, non-tender, non-distended, normoactive bowel sounds,  no masses ,no hepato-splenomegaly, soft reducible hernia, gastrostomy stoma closed  EXTREMITIES:  no cyanosis,clubbing or edema, contracture LUE  SKIN:  No rash/erythema/petechiae, no jaundice  NEURO:  Alert, oriented, no asterixis, generalized weakness  RECTAL: Deferred    Vital Signs Last 24 Hrs  T(C): 36.8 (06 Mar 2021 05:16), Max: 36.8 (06 Mar 2021 05:16)  T(F): 98.2 (06 Mar 2021 05:16), Max: 98.2 (06 Mar 2021 05:16)  HR: 80 (06 Mar 2021 05:16) (80 - 95)  BP: 155/83 (06 Mar 2021 05:16) (146/60 - 155/83)  BP(mean): --  RR: 20 (06 Mar 2021 10:40) (16 - 20)  SpO2: 91% (06 Mar 2021 10:40) (88% - 100%)    03    137  |  103  |  10  ----------------------------<  112<H>  4.8   |  21<L>  |  0.35<L>    Ca    10.0      06 Mar 2021 07:37  Phos  4.4     03-  Mg     1.6     -06

## 2021-03-07 LAB
ANION GAP SERPL CALC-SCNC: 13 MMOL/L — SIGNIFICANT CHANGE UP (ref 7–14)
BUN SERPL-MCNC: 9 MG/DL — SIGNIFICANT CHANGE UP (ref 7–23)
CALCIUM SERPL-MCNC: 9.9 MG/DL — SIGNIFICANT CHANGE UP (ref 8.4–10.5)
CHLORIDE SERPL-SCNC: 100 MMOL/L — SIGNIFICANT CHANGE UP (ref 98–107)
CO2 SERPL-SCNC: 23 MMOL/L — SIGNIFICANT CHANGE UP (ref 22–31)
CREAT SERPL-MCNC: 0.35 MG/DL — LOW (ref 0.5–1.3)
CULTURE RESULTS: SIGNIFICANT CHANGE UP
CULTURE RESULTS: SIGNIFICANT CHANGE UP
GLUCOSE SERPL-MCNC: 119 MG/DL — HIGH (ref 70–99)
HCT VFR BLD CALC: 39.1 % — SIGNIFICANT CHANGE UP (ref 34.5–45)
HGB BLD-MCNC: 11.6 G/DL — SIGNIFICANT CHANGE UP (ref 11.5–15.5)
MAGNESIUM SERPL-MCNC: 2 MG/DL — SIGNIFICANT CHANGE UP (ref 1.6–2.6)
MCHC RBC-ENTMCNC: 27.1 PG — SIGNIFICANT CHANGE UP (ref 27–34)
MCHC RBC-ENTMCNC: 29.7 GM/DL — LOW (ref 32–36)
MCV RBC AUTO: 91.4 FL — SIGNIFICANT CHANGE UP (ref 80–100)
NRBC # BLD: 0 /100 WBCS — SIGNIFICANT CHANGE UP
NRBC # FLD: 0 K/UL — SIGNIFICANT CHANGE UP
PHOSPHATE SERPL-MCNC: 4.3 MG/DL — SIGNIFICANT CHANGE UP (ref 2.5–4.5)
PLATELET # BLD AUTO: 596 K/UL — HIGH (ref 150–400)
POTASSIUM SERPL-MCNC: 5 MMOL/L — SIGNIFICANT CHANGE UP (ref 3.5–5.3)
POTASSIUM SERPL-SCNC: 5 MMOL/L — SIGNIFICANT CHANGE UP (ref 3.5–5.3)
RBC # BLD: 4.28 M/UL — SIGNIFICANT CHANGE UP (ref 3.8–5.2)
RBC # FLD: 16.1 % — HIGH (ref 10.3–14.5)
SARS-COV-2 RNA SPEC QL NAA+PROBE: DETECTED
SODIUM SERPL-SCNC: 136 MMOL/L — SIGNIFICANT CHANGE UP (ref 135–145)
SPECIMEN SOURCE: SIGNIFICANT CHANGE UP
SPECIMEN SOURCE: SIGNIFICANT CHANGE UP
WBC # BLD: 9.96 K/UL — SIGNIFICANT CHANGE UP (ref 3.8–10.5)
WBC # FLD AUTO: 9.96 K/UL — SIGNIFICANT CHANGE UP (ref 3.8–10.5)

## 2021-03-07 RX ADMIN — AMLODIPINE BESYLATE 5 MILLIGRAM(S): 2.5 TABLET ORAL at 05:17

## 2021-03-07 RX ADMIN — Medication 1 APPLICATION(S): at 05:17

## 2021-03-07 RX ADMIN — NYSTATIN CREAM 1 APPLICATION(S): 100000 CREAM TOPICAL at 13:17

## 2021-03-07 RX ADMIN — NYSTATIN CREAM 1 APPLICATION(S): 100000 CREAM TOPICAL at 05:17

## 2021-03-07 RX ADMIN — Medication 1 APPLICATION(S): at 18:10

## 2021-03-07 RX ADMIN — Medication 650 MILLIGRAM(S): at 00:03

## 2021-03-07 RX ADMIN — Medication 50 MILLIGRAM(S): at 18:11

## 2021-03-07 RX ADMIN — CHOLESTYRAMINE 4 GRAM(S): 4 POWDER, FOR SUSPENSION ORAL at 13:16

## 2021-03-07 RX ADMIN — ALBUTEROL 2 PUFF(S): 90 AEROSOL, METERED ORAL at 13:18

## 2021-03-07 RX ADMIN — Medication 1 TABLET(S): at 05:17

## 2021-03-07 RX ADMIN — CHOLESTYRAMINE 4 GRAM(S): 4 POWDER, FOR SUSPENSION ORAL at 21:26

## 2021-03-07 RX ADMIN — CHLORHEXIDINE GLUCONATE 1 APPLICATION(S): 213 SOLUTION TOPICAL at 13:16

## 2021-03-07 RX ADMIN — ALBUTEROL 2 PUFF(S): 90 AEROSOL, METERED ORAL at 05:18

## 2021-03-07 RX ADMIN — ZINC SULFATE TAB 220 MG (50 MG ZINC EQUIVALENT) 220 MILLIGRAM(S): 220 (50 ZN) TAB at 13:17

## 2021-03-07 RX ADMIN — Medication 1 TABLET(S): at 13:17

## 2021-03-07 RX ADMIN — Medication 1 TABLET(S): at 18:10

## 2021-03-07 RX ADMIN — Medication 50 MILLIGRAM(S): at 05:17

## 2021-03-07 RX ADMIN — ALBUTEROL 2 PUFF(S): 90 AEROSOL, METERED ORAL at 18:10

## 2021-03-07 RX ADMIN — RIVAROXABAN 20 MILLIGRAM(S): KIT at 18:11

## 2021-03-07 RX ADMIN — PANTOPRAZOLE SODIUM 40 MILLIGRAM(S): 20 TABLET, DELAYED RELEASE ORAL at 13:17

## 2021-03-07 RX ADMIN — NYSTATIN CREAM 1 APPLICATION(S): 100000 CREAM TOPICAL at 21:26

## 2021-03-07 RX ADMIN — ALBUTEROL 2 PUFF(S): 90 AEROSOL, METERED ORAL at 21:26

## 2021-03-07 RX ADMIN — Medication 0.5 MILLIGRAM(S): at 13:21

## 2021-03-07 NOTE — PROGRESS NOTE ADULT - SUBJECTIVE AND OBJECTIVE BOX
Chief Complaint:  Patient is a 60y old  Female who presents with a chief complaint of Transfer from [facility] (05 Mar 2021 12:54)      Interval Events:   no diarrhea  no abdominal pain  eating well    Allergies:  Kiwi (Unknown)  latex (Anaphylaxis)  latex (Unknown)  penicillin (Other)  penicillin (Unknown)  perfume  hives (Other)  potassium acetate (Other)  soap additives hives (Other)      Hospital Medications:  acetaminophen    Suspension .. 650 milliGRAM(s) Oral every 6 hours PRN  ALBUTerol    90 MICROgram(s) HFA Inhaler 2 Puff(s) Inhalation every 6 hours  amLODIPine   Tablet 5 milliGRAM(s) Oral daily  artificial tears (preservative free) Ophthalmic Solution 1 Drop(s) Both EYES every 8 hours PRN  chlorhexidine 4% Liquid 1 Application(s) Topical daily  cholestyramine Powder (Sugar-Free) 4 Gram(s) Oral two times a day  clonazePAM  Tablet 0.5 milliGRAM(s) Oral daily  collagenase Ointment 1 Application(s) Topical two times a day  dextrose 40% Gel 15 Gram(s) Oral once  dextrose 5%. 1000 milliLiter(s) IV Continuous <Continuous>  dextrose 5%. 1000 milliLiter(s) IV Continuous <Continuous>  dextrose 5%. 1000 milliLiter(s) IV Continuous <Continuous>  dextrose 50% Injectable 25 Gram(s) IV Push once  dextrose 50% Injectable 12.5 Gram(s) IV Push once  dextrose 50% Injectable 25 Gram(s) IV Push once  diphenoxylate/atropine 1 Tablet(s) Oral four times a day PRN  glucagon  Injectable 1 milliGRAM(s) IntraMuscular once  guaiFENesin   Syrup  (Sugar-Free) 100 milliGRAM(s) Oral every 6 hours PRN  insulin lispro (ADMELOG) corrective regimen sliding scale   SubCutaneous Before meals and at bedtime  lactobacillus acidophilus 1 Tablet(s) Oral two times a day  metoprolol tartrate 50 milliGRAM(s) Oral two times a day  multivitamin 1 Tablet(s) Oral daily  nystatin Powder 1 Application(s) Topical every 8 hours  pantoprazole  Injectable 40 milliGRAM(s) IV Push daily  rivaroxaban 20 milliGRAM(s) Oral with dinner  Viokase 10,440 units 1 Tablet(s) 1 Tablet(s) Enteral Tube once  zinc sulfate 220 milliGRAM(s) Oral daily      PMHX/PSHX:  Pneumomediastinum    Umbilical hernia    Osteoarthritis of both knees, unspecified osteoarthritis type    AARON (Obstructive Sleep Apnea)    Pneumonia    S/P Colon Resection    pt with allergies to perfumes and addidtive to soaps, requires hypoallergenic sheets and gowns, line    Cervical Dysplasia    Latex Sensitivity    GERD (Gastroesophageal Reflux Disease)    Asthma    Anemia    S/P Joint Replacement    DVT (Deep Venous Thrombosis)    HTN (Hypertension)    H/O ileostomy    S/P LEEP    S/P Exploratory Laparotomy    S/P Colonoscopy    S/P Endoscopy    S/P  Section    History of Tubal Ligation    S/P Knee Surgery    Umbilical Hernia    Osteoarthritis of Knee    HTN (Hypertension)        Family history:  Family history of gastric cancer    Family history of breast cancer (Grandparent)        ROS:     General:  No wt loss, fevers, chills, night sweats, fatigue,   Eyes:  Good vision, no reported pain  ENT:  No sore throat, pain, runny nose, dysphagia  CV:  No pain, palpitations, hypo/hypertension  Resp:  No dyspnea, cough, tachypnea, wheezing  GI:  See HPI  :  No pain, bleeding, incontinence, nocturia  Muscle:  No pain, weakness  Neuro:  + weakness, no tingling, memory problems  Psych:  No fatigue, insomnia, mood problems, depression  Endocrine:  No polyuria, polydipsia, cold/heat intolerance  Heme:  No petechiae, ecchymosis, easy bruisability  Skin:  No rash, edema      PHYSICAL EXAM:     GENERAL:  Appears stated age, well-groomed, well-nourished, no distress  HEENT:  NC/AT,  conjunctivae clear, sclera-anicteric  NECK: Trachea midline, supple  CHEST:  Full & symmetric excursion, no increased effort, breath sounds clear  HEART:  Regular rhythm, no lola/heave  ABDOMEN:  Soft, non-tender, non-distended, normoactive bowel sounds,  no masses ,no hepato-splenomegaly, soft reducible hernia, gastrostomy stoma closed  EXTREMITIES:  no cyanosis,clubbing or edema, contracture LUE  SKIN:  No rash/erythema/petechiae, no jaundice  NEURO:  Alert, oriented, no asterixis, generalized weakness  RECTAL: Deferred    Vital Signs Last 24 Hrs  T(C): 36.3 (07 Mar 2021 13:14), Max: 37.1 (06 Mar 2021 18:15)  T(F): 97.4 (07 Mar 2021 13:14), Max: 98.7 (06 Mar 2021 18:15)  HR: 85 (07 Mar 2021 13:14) (83 - 104)  BP: 165/79 (07 Mar 2021 13:14) (149/69 - 165/79)  BP(mean): --  RR: 18 (07 Mar 2021 13:14) (18 - 18)  SpO2: 99% (07 Mar 2021 13:14) (96% - 99%)    CBC Full  -  ( 07 Mar 2021 06:48 )  WBC Count : 9.96 K/uL  RBC Count : 4.28 M/uL  Hemoglobin : 11.6 g/dL  Hematocrit : 39.1 %  Platelet Count - Automated : 596 K/uL  Mean Cell Volume : 91.4 fL  Mean Cell Hemoglobin : 27.1 pg  Mean Cell Hemoglobin Concentration : 29.7 gm/dL  Auto Neutrophil # : x  Auto Lymphocyte # : x  Auto Monocyte # : x  Auto Eosinophil # : x  Auto Basophil # : x  Auto Neutrophil % : x  Auto Lymphocyte % : x  Auto Monocyte % : x  Auto Eosinophil % : x  Auto Basophil % : x        136  |  100  |  9   ----------------------------<  119<H>  5.0   |  23  |  0.35<L>    Ca    9.9      07 Mar 2021 06:48  Phos  4.3     -  Mg     2.0     -

## 2021-03-07 NOTE — PROGRESS NOTE ADULT - ASSESSMENT
60 year old female with prolonged hospital course secondary to COVID 19, GI consulted for persistent diarrhea     Problem/Recommendation - 1:  Problem: COVID-19. Recommendation: status post treatment by infectious disease  now off of isolation.  pending dispo to acute rehab.     Problem/Recommendation - 2:  ·  Problem: Respiratory failure.  Recommendation: status post tracheostomy, now decannulated, on room air.     Problem/Recommendation - 3:  ·  Problem: Diarrhea.  Recommendation: now improved, was most likely due to tube feeds. Now tolerating regular food.  -will scale back lomotil to PRN as it is unlikely to be necessary at this time, will continue questran but can titrate as needed.     Problem/Recommendation - 4:  ·  Problem: Hernia, incisional.  Recommendation: reducible, nontender. No signs of incarceration.   -serial abdominal exams.      Problem/Recommendation - 5:  ·  Problem: Gastrostomy present.  Recommendation: patient tolerating good PO intake. Passed swallow eval. Gastrostomy removed at bedside and bandaged.     Problem/Recommendation - 6:  Problem: ARTEMIO (acute kidney injury). Recommendation: s/p CVVHD, now improved.     Problem/Recommendation - 7:  Problem: History of pressure ulcer. Recommendation: per RCU team.     Problem/Recommendation - 8:  Problem: Clostridium difficile infection. Recommendation: status post 2 weeks of PO vanco  -vanco per ID on hold.    Attending Attestation:   Differential diagnosis and plan of care discussed with patient after the evaluation  75 Minutes spent on total encounter of which more than fifty percent of the encounter was spent counseling and/or coordinating care by the attending physician.      Ranjeet Ponce M.D.   Gastroenterology and Hepatology  Cell: [phone].

## 2021-03-07 NOTE — PROGRESS NOTE ADULT - ASSESSMENT
61 YO F with PMHx of HTN, DVT on Xarelto, Peritonitis s/p Oral's Procedure and Ileostomy (reversed in 2011) and AARON who presented to [facility] on 11/18 for AHRF second to COVID 19, intubated on 11/23 complicated by lung abscesses on CT CHEST 12/5, multi-bacterial PNA and bacteremia, ARTEMIO s/p CRRT and HD, and s/p Tracheostomy 12/18. Transferred to RCU for further management, now transferred to . Pt now decannulated and PEG removed, eating dysphagia diet, pending dc to acute rehab.         # CDIFF (+)   - s/p PO Vancomycin 2/1-2/25  - Stool O&P and GI PCR NGTD, c/w Lomotil QID   - GI eval appreciated   -stool more formed as of 3/4, will need at least 48 hrs of formed stool prior to DC. Earliest pt would be able to dc'd is Saturday 3/6  - pending D/C       # ARDS second to COVID vs Multi-bacterial PNA/ R Lung Abscess  - s/p Intubated on 11/23 and c/b multi-bacterial PNA and lung abscesses.   - s/p Tracheostomy on 12/18 by CTSx. Sutures out 1/1  - s/p Air leak noted and s/p Bivona with CTSx 12/31. s/p Downsized to 6DCT 2/25  - s/p Successfully decannulated on 3/1, Now on 2LNC saturating well  - Proventil and Chest PT q6hr PRN      # ARTEMIO s/p CRRT and HD   - ARTEMIO now resolved and no longer required HD.   - Hypokalemia in setting of diarrhea/ CDIFF. Monitor electrolytes.   - Monitor renal function and avoid nephrotoxins.       # Sepsis second to COVID vs Multi-bacterial PNA/ Bacteremia and R Lung Abscess vs CDIFF (+)   - COVID with superimposed multi-bacterial VAP vs aspiration PNA vs cavitary PNA.   - SCx (11/24) MSSA and BCx (11/27) with MSSA and Proteus Bacteremia  - SCx (12/1, 12/12 and 12/27) with Stenotrophomonas.   - SCx (1/9) with  Pseudomonas and Stenotrophomonas   - Bcx has been persistently negative from 12/1, and most recently 1/12   - CT CHEST 12/4 with right lung cavitation.   - s/p multiple antibiotics, but now completed Fortaz and Flagyl (1/12-1/16)  - RPT CT CHEST 1/28 with improvement of right lung abscess   - SCx with  Pseudomonas and Stenotrophomonas and s/p TED/ Fortaz (1/28-2/6)      # DMII   - Continue on ISS   - FS well controlled      # Hx of DVT   - c/w Xarelto      # Contracted Wrists   - PT/OT working on strength   OMT at the bedside       # CODE STATUS - FULL CODE     # DISPO - PM&R recs ACUTE REHAB

## 2021-03-07 NOTE — PROGRESS NOTE ADULT - SUBJECTIVE AND OBJECTIVE BOX
DATE OF SERVICE: 03-07-21    Subjective: Patient seen and examined. No new events except as noted.   doing okay     REVIEW OF SYSTEMS:    CONSTITUTIONAL: No weakness, fevers or chills  EYES/ENT: No visual changes;  No vertigo or throat pain   NECK: No pain or stiffness  RESPIRATORY: No cough, wheezing, hemoptysis; No shortness of breath  CARDIOVASCULAR: No chest pain or palpitations  GASTROINTESTINAL: No abdominal or epigastric pain.   GENITOURINARY: No dysuria, frequency or hematuria  NEUROLOGICAL: No numbness or weakness  SKIN: No itching, burning, rashes, or lesions   All other review of systems is negative unless indicated above.    MEDICATIONS:  MEDICATIONS  (STANDING):  ALBUTerol    90 MICROgram(s) HFA Inhaler 2 Puff(s) Inhalation every 6 hours  amLODIPine   Tablet 5 milliGRAM(s) Oral daily  chlorhexidine 4% Liquid 1 Application(s) Topical daily  cholestyramine Powder (Sugar-Free) 4 Gram(s) Oral two times a day  clonazePAM  Tablet 0.5 milliGRAM(s) Oral daily  collagenase Ointment 1 Application(s) Topical two times a day  dextrose 40% Gel 15 Gram(s) Oral once  dextrose 5%. 1000 milliLiter(s) (50 mL/Hr) IV Continuous <Continuous>  dextrose 5%. 1000 milliLiter(s) (100 mL/Hr) IV Continuous <Continuous>  dextrose 5%. 1000 milliLiter(s) (75 mL/Hr) IV Continuous <Continuous>  dextrose 50% Injectable 25 Gram(s) IV Push once  dextrose 50% Injectable 12.5 Gram(s) IV Push once  dextrose 50% Injectable 25 Gram(s) IV Push once  glucagon  Injectable 1 milliGRAM(s) IntraMuscular once  insulin lispro (ADMELOG) corrective regimen sliding scale   SubCutaneous Before meals and at bedtime  lactobacillus acidophilus 1 Tablet(s) Oral two times a day  metoprolol tartrate 50 milliGRAM(s) Oral two times a day  multivitamin 1 Tablet(s) Oral daily  nystatin Powder 1 Application(s) Topical every 8 hours  pantoprazole  Injectable 40 milliGRAM(s) IV Push daily  rivaroxaban 20 milliGRAM(s) Oral with dinner  Viokase 10,440 units 1 Tablet(s) 1 Tablet(s) Enteral Tube once  zinc sulfate 220 milliGRAM(s) Oral daily      PHYSICAL EXAM:  T(C): 36.8 (03-07-21 @ 21:25), Max: 36.8 (03-07-21 @ 21:25)  HR: 90 (03-07-21 @ 21:25) (83 - 100)  BP: 145/66 (03-07-21 @ 21:25) (142/76 - 165/79)  RR: 18 (03-07-21 @ 21:25) (18 - 19)  SpO2: 97% (03-07-21 @ 21:25) (97% - 99%)  Wt(kg): --  I&O's Summary    06 Mar 2021 07:01  -  07 Mar 2021 07:00  --------------------------------------------------------  IN: 820 mL / OUT: 350 mL / NET: 470 mL          Appearance: Normal	  HEENT:  PERRLA   Lymphatic: No lymphadenopathy   Cardiovascular: Normal S1 S2, no JVD  Respiratory: normal effort , clear  Gastrointestinal:  Soft, Non-tender  Skin: No rashes,  warm to touch  Psychiatry:  Mood & affect appropriate  Musculuskeletal: No edema      All labs, Imaging and EKGs personally reviewed                           11.6   9.96  )-----------( 596      ( 07 Mar 2021 06:48 )             39.1               03-07    136  |  100  |  9   ----------------------------<  119<H>  5.0   |  23  |  0.35<L>    Ca    9.9      07 Mar 2021 06:48  Phos  4.3     03-07  Mg     2.0     03-07

## 2021-03-08 LAB
ANION GAP SERPL CALC-SCNC: 11 MMOL/L — SIGNIFICANT CHANGE UP (ref 7–14)
BUN SERPL-MCNC: 9 MG/DL — SIGNIFICANT CHANGE UP (ref 7–23)
CALCIUM SERPL-MCNC: 10.1 MG/DL — SIGNIFICANT CHANGE UP (ref 8.4–10.5)
CHLORIDE SERPL-SCNC: 104 MMOL/L — SIGNIFICANT CHANGE UP (ref 98–107)
CO2 SERPL-SCNC: 23 MMOL/L — SIGNIFICANT CHANGE UP (ref 22–31)
CREAT SERPL-MCNC: 0.36 MG/DL — LOW (ref 0.5–1.3)
GLUCOSE SERPL-MCNC: 137 MG/DL — HIGH (ref 70–99)
HCT VFR BLD CALC: 38.6 % — SIGNIFICANT CHANGE UP (ref 34.5–45)
HGB BLD-MCNC: 11.5 G/DL — SIGNIFICANT CHANGE UP (ref 11.5–15.5)
MAGNESIUM SERPL-MCNC: 1.7 MG/DL — SIGNIFICANT CHANGE UP (ref 1.6–2.6)
MCHC RBC-ENTMCNC: 27.1 PG — SIGNIFICANT CHANGE UP (ref 27–34)
MCHC RBC-ENTMCNC: 29.8 GM/DL — LOW (ref 32–36)
MCV RBC AUTO: 90.8 FL — SIGNIFICANT CHANGE UP (ref 80–100)
NRBC # BLD: 0 /100 WBCS — SIGNIFICANT CHANGE UP
NRBC # FLD: 0 K/UL — SIGNIFICANT CHANGE UP
PHOSPHATE SERPL-MCNC: 3.9 MG/DL — SIGNIFICANT CHANGE UP (ref 2.5–4.5)
PLATELET # BLD AUTO: 492 K/UL — HIGH (ref 150–400)
POTASSIUM SERPL-MCNC: 3.9 MMOL/L — SIGNIFICANT CHANGE UP (ref 3.5–5.3)
POTASSIUM SERPL-SCNC: 3.9 MMOL/L — SIGNIFICANT CHANGE UP (ref 3.5–5.3)
RBC # BLD: 4.25 M/UL — SIGNIFICANT CHANGE UP (ref 3.8–5.2)
RBC # FLD: 15.9 % — HIGH (ref 10.3–14.5)
SODIUM SERPL-SCNC: 138 MMOL/L — SIGNIFICANT CHANGE UP (ref 135–145)
WBC # BLD: 13 K/UL — HIGH (ref 3.8–10.5)
WBC # FLD AUTO: 13 K/UL — HIGH (ref 3.8–10.5)

## 2021-03-08 PROCEDURE — 99232 SBSQ HOSP IP/OBS MODERATE 35: CPT

## 2021-03-08 RX ADMIN — Medication 1 TABLET(S): at 18:37

## 2021-03-08 RX ADMIN — ZINC SULFATE TAB 220 MG (50 MG ZINC EQUIVALENT) 220 MILLIGRAM(S): 220 (50 ZN) TAB at 13:43

## 2021-03-08 RX ADMIN — ALBUTEROL 2 PUFF(S): 90 AEROSOL, METERED ORAL at 22:50

## 2021-03-08 RX ADMIN — NYSTATIN CREAM 1 APPLICATION(S): 100000 CREAM TOPICAL at 13:45

## 2021-03-08 RX ADMIN — Medication 1 APPLICATION(S): at 05:03

## 2021-03-08 RX ADMIN — ALBUTEROL 2 PUFF(S): 90 AEROSOL, METERED ORAL at 05:03

## 2021-03-08 RX ADMIN — Medication 0.5 MILLIGRAM(S): at 13:48

## 2021-03-08 RX ADMIN — NYSTATIN CREAM 1 APPLICATION(S): 100000 CREAM TOPICAL at 05:03

## 2021-03-08 RX ADMIN — CHOLESTYRAMINE 4 GRAM(S): 4 POWDER, FOR SUSPENSION ORAL at 13:44

## 2021-03-08 RX ADMIN — RIVAROXABAN 20 MILLIGRAM(S): KIT at 18:37

## 2021-03-08 RX ADMIN — Medication 50 MILLIGRAM(S): at 18:38

## 2021-03-08 RX ADMIN — NYSTATIN CREAM 1 APPLICATION(S): 100000 CREAM TOPICAL at 22:50

## 2021-03-08 RX ADMIN — Medication 50 MILLIGRAM(S): at 05:03

## 2021-03-08 RX ADMIN — Medication 1 APPLICATION(S): at 18:38

## 2021-03-08 RX ADMIN — Medication 1 TABLET(S): at 05:03

## 2021-03-08 RX ADMIN — CHOLESTYRAMINE 4 GRAM(S): 4 POWDER, FOR SUSPENSION ORAL at 22:50

## 2021-03-08 RX ADMIN — ALBUTEROL 2 PUFF(S): 90 AEROSOL, METERED ORAL at 13:44

## 2021-03-08 RX ADMIN — AMLODIPINE BESYLATE 5 MILLIGRAM(S): 2.5 TABLET ORAL at 05:03

## 2021-03-08 RX ADMIN — PANTOPRAZOLE SODIUM 40 MILLIGRAM(S): 20 TABLET, DELAYED RELEASE ORAL at 13:45

## 2021-03-08 RX ADMIN — Medication 1 TABLET(S): at 13:48

## 2021-03-08 RX ADMIN — CHLORHEXIDINE GLUCONATE 1 APPLICATION(S): 213 SOLUTION TOPICAL at 13:44

## 2021-03-08 RX ADMIN — ALBUTEROL 2 PUFF(S): 90 AEROSOL, METERED ORAL at 18:38

## 2021-03-08 NOTE — PROGRESS NOTE ADULT - SUBJECTIVE AND OBJECTIVE BOX
DATE OF SERVICE: 03-08-21 @ 14:36    Subjective: Patient seen and examined. No new events except as noted.   doing well  diarrhea stopped  pending D/C to acute rehab     REVIEW OF SYSTEMS:    CONSTITUTIONAL: No weakness, fevers or chills  EYES/ENT: No visual changes;  No vertigo or throat pain   NECK: No pain or stiffness  RESPIRATORY: No cough, wheezing, hemoptysis; No shortness of breath  CARDIOVASCULAR: No chest pain or palpitations  GASTROINTESTINAL: No abdominal or epigastric pain. No nausea, vomiting, or hematemesis; No diarrhea or constipation. No melena or hematochezia.  GENITOURINARY: No dysuria, frequency or hematuria  NEUROLOGICAL: No numbness or weakness  SKIN: No itching, burning, rashes, or lesions   All other review of systems is negative unless indicated above.    MEDICATIONS:  MEDICATIONS  (STANDING):  ALBUTerol    90 MICROgram(s) HFA Inhaler 2 Puff(s) Inhalation every 6 hours  amLODIPine   Tablet 5 milliGRAM(s) Oral daily  chlorhexidine 4% Liquid 1 Application(s) Topical daily  cholestyramine Powder (Sugar-Free) 4 Gram(s) Oral two times a day  clonazePAM  Tablet 0.5 milliGRAM(s) Oral daily  collagenase Ointment 1 Application(s) Topical two times a day  dextrose 40% Gel 15 Gram(s) Oral once  dextrose 5%. 1000 milliLiter(s) (50 mL/Hr) IV Continuous <Continuous>  dextrose 5%. 1000 milliLiter(s) (100 mL/Hr) IV Continuous <Continuous>  dextrose 5%. 1000 milliLiter(s) (75 mL/Hr) IV Continuous <Continuous>  dextrose 50% Injectable 25 Gram(s) IV Push once  dextrose 50% Injectable 12.5 Gram(s) IV Push once  dextrose 50% Injectable 25 Gram(s) IV Push once  glucagon  Injectable 1 milliGRAM(s) IntraMuscular once  insulin lispro (ADMELOG) corrective regimen sliding scale   SubCutaneous Before meals and at bedtime  lactobacillus acidophilus 1 Tablet(s) Oral two times a day  metoprolol tartrate 50 milliGRAM(s) Oral two times a day  multivitamin 1 Tablet(s) Oral daily  nystatin Powder 1 Application(s) Topical every 8 hours  pantoprazole  Injectable 40 milliGRAM(s) IV Push daily  rivaroxaban 20 milliGRAM(s) Oral with dinner  Viokase 10,440 units 1 Tablet(s) 1 Tablet(s) Enteral Tube once  zinc sulfate 220 milliGRAM(s) Oral daily      PHYSICAL EXAM:  T(C): 36.9 (03-08-21 @ 13:00), Max: 36.9 (03-08-21 @ 05:01)  HR: 94 (03-08-21 @ 13:00) (87 - 100)  BP: 158/84 (03-08-21 @ 13:00) (142/60 - 158/84)  RR: 18 (03-08-21 @ 13:00) (18 - 19)  SpO2: 97% (03-08-21 @ 13:00) (97% - 100%)  Wt(kg): --  I&O's Summary    07 Mar 2021 07:01  -  08 Mar 2021 07:00  --------------------------------------------------------  IN: 360 mL / OUT: 700 mL / NET: -340 mL          Appearance: Normal	  HEENT:  PERRLA   Lymphatic: No lymphadenopathy   Cardiovascular: Normal S1 S2, no JVD  Respiratory: normal effort , clear  Gastrointestinal:  Soft, Non-tender  Skin: No rashes,  warm to touch  Psychiatry:  Mood & affect appropriate  Musculuskeletal: No edema      All labs, Imaging and EKGs personally reviewed                             11.5 13.00 )-----------( 492      ( 08 Mar 2021 05:28 )             38.6               03-08    138  |  104  |  9   ----------------------------<  137<H>  3.9   |  23  |  0.36<L>    Ca    10.1      08 Mar 2021 05:28  Phos  3.9     03-08  Mg     1.7     03-08

## 2021-03-08 NOTE — PROGRESS NOTE ADULT - SUBJECTIVE AND OBJECTIVE BOX
Patient is a 60y old  Female who presents with a chief complaint of Transfer from [facility] (07 Mar 2021 15:09)      HPI:  Patient is a 59yo F w/ AARON, peritonitis s/p Oral's procedure and ileostomy (reversed ), HTN, prior DVT/PE, ?Asthma admitted on 2020 to [facility] after presenting for  productive cough  w/ SOB x9 days and diarrhea x7 days and fever x1 day. Patient was found to be COVID + on 2020 and completed remdesivir -. Patient was intubated for airway protection on 2020. Patient has required proning (last 2020). Hospital course c/b by ATN, requiring CVVHD now on intermittent HD, unresponsive to bumex challenge last HD . Course also c/b Stenotrophomonas (, completed Levaquin x7 days), MSSA/P. mirablis bacteremia (on Cefazolin, potentially due to urine vs line-associated and MSSA in sputum due to PNA), also with lung abscesses on CT chest on .  Patient currently on Volume AC 30/350/8/35% on Precedex. Patient intermittently opens eyes but is not yet responsive. Transferred over to [facility] on 12/10 to offload [facility] COVID ICU    (10 Dec 2020 14:13)    reports regular bm, no new complaints.     REVIEW OF SYSTEMS  Constitutional - No fever, No weight loss, No fatigue  HEENT - No eye pain, No visual disturbances, No difficulty hearing, No tinnitus, No vertigo, No neck pain  Respiratory - No cough, No wheezing, No shortness of breath  Cardiovascular - No chest pain, No palpitations  Gastrointestinal - No abdominal pain, No nausea, No vomiting, No diarrhea, No constipation  Genitourinary - No dysuria, No frequency, No hematuria, No incontinence  Neurological - No headaches, No memory loss, + loss of strength, No numbness, No tremors  Skin - No itching, No rashes, No lesions   Endocrine - No temperature intolerance  Musculoskeletal - No joint pain, No joint swelling, No muscle pain  Psychiatric - No depression, No anxiety      PAST MEDICAL & SURGICAL HISTORY  Pneumomediastinum    Umbilical hernia    Osteoarthritis of both knees, unspecified osteoarthritis type    AARON (Obstructive Sleep Apnea)    Pneumonia    S/P Colon Resection    pt with allergies to perfumes and addidtive to soaps, requires hypoallergenic sheets and gowns, line    Cervical Dysplasia    Latex Sensitivity    GERD (Gastroesophageal Reflux Disease)    Asthma    Anemia    S/P Joint Replacement    DVT (Deep Venous Thrombosis)    HTN (Hypertension)    H/O ileostomy    S/P LEEP    S/P Exploratory Laparotomy    S/P Colonoscopy    S/P Endoscopy    S/P  Section    History of Tubal Ligation    S/P Knee Surgery    Umbilical Hernia    Osteoarthritis of Knee    HTN (Hypertension)      CURRENT FUNCTIONAL STATUS  BM: max of 2    FAMILY HISTORY   Family history of gastric cancer  Family history of breast cancer (Grandparent)        RECENT LABS/IMAGING  CBC Full  -  ( 08 Mar 2021 05:28 )  WBC Count : 13.00 K/uL  RBC Count : 4.25 M/uL  Hemoglobin : 11.5 g/dL  Hematocrit : 38.6 %  Platelet Count - Automated : 492 K/uL  Mean Cell Volume : 90.8 fL  Mean Cell Hemoglobin : 27.1 pg  Mean Cell Hemoglobin Concentration : 29.8 gm/dL  Auto Neutrophil # : x  Auto Lymphocyte # : x  Auto Monocyte # : x  Auto Eosinophil # : x  Auto Basophil # : x  Auto Neutrophil % : x  Auto Lymphocyte % : x  Auto Monocyte % : x  Auto Eosinophil % : x  Auto Basophil % : x        138  |  104  |  9   ----------------------------<  137<H>  3.9   |  23  |  0.36<L>    Ca    10.1      08 Mar 2021 05:28  Phos  3.9     -  Mg     1.7               VITALS  T(C): 36.9 (21 @ 05:01), Max: 36.9 (21 @ 05:01)  HR: 87 (21 @ 05:01) (85 - 100)  BP: 142/60 (21 @ 05:01) (142/60 - 165/79)  RR: 18 (21 @ 05:01) (18 - 19)  SpO2: 100% (21 @ 05:01) (97% - 100%)  Wt(kg): --    ALLERGIES  Kiwi (Unknown)  latex (Anaphylaxis)  latex (Unknown)  penicillin (Other)  penicillin (Unknown)  perfume  hives (Other)  potassium acetate (Other)  soap additives hives (Other)      MEDICATIONS   acetaminophen    Suspension .. 650 milliGRAM(s) Oral every 6 hours PRN  ALBUTerol    90 MICROgram(s) HFA Inhaler 2 Puff(s) Inhalation every 6 hours  amLODIPine   Tablet 5 milliGRAM(s) Oral daily  artificial tears (preservative free) Ophthalmic Solution 1 Drop(s) Both EYES every 8 hours PRN  chlorhexidine 4% Liquid 1 Application(s) Topical daily  cholestyramine Powder (Sugar-Free) 4 Gram(s) Oral two times a day  clonazePAM  Tablet 0.5 milliGRAM(s) Oral daily  collagenase Ointment 1 Application(s) Topical two times a day  dextrose 40% Gel 15 Gram(s) Oral once  dextrose 5%. 1000 milliLiter(s) IV Continuous <Continuous>  dextrose 5%. 1000 milliLiter(s) IV Continuous <Continuous>  dextrose 5%. 1000 milliLiter(s) IV Continuous <Continuous>  dextrose 50% Injectable 25 Gram(s) IV Push once  dextrose 50% Injectable 12.5 Gram(s) IV Push once  dextrose 50% Injectable 25 Gram(s) IV Push once  diphenoxylate/atropine 1 Tablet(s) Oral four times a day PRN  glucagon  Injectable 1 milliGRAM(s) IntraMuscular once  guaiFENesin   Syrup  (Sugar-Free) 100 milliGRAM(s) Oral every 6 hours PRN  insulin lispro (ADMELOG) corrective regimen sliding scale   SubCutaneous Before meals and at bedtime  lactobacillus acidophilus 1 Tablet(s) Oral two times a day  metoprolol tartrate 50 milliGRAM(s) Oral two times a day  multivitamin 1 Tablet(s) Oral daily  nystatin Powder 1 Application(s) Topical every 8 hours  pantoprazole  Injectable 40 milliGRAM(s) IV Push daily  rivaroxaban 20 milliGRAM(s) Oral with dinner  Viokase 10,440 units 1 Tablet(s) 1 Tablet(s) Enteral Tube once  zinc sulfate 220 milliGRAM(s) Oral daily      ----------------------------------------------------------------------------------------    PHYSICAL EXAM  Constitutional - NAD, Comfortable   HEENT - NCAT, EOMI,  dressing over decannulated trach site  Chest - no respiratory distress  Cardiovascular - RRR, S1S2   Abdomen -  Soft, NTND   Extremities - No C/C/E, No calf tenderness   Neurologic Exam -                    Cognitive - Awake, Alert, AAO to self, place, date, year, situation     Communication - Fluent, No dysarthria     Cranial Nerves - CN 2-12 intact     Motor -                      LEFT    UE - ShAB 4/5, EF 1/5, EE 3/5, WE 0/5,  0/5                    RIGHT UE - ShAB 4/5, EF 2/5, EE 3/5, WE 1/5,  2/5                    LEFT    LE - HF 2/5, KE 3/5, DF 1/5, PF 4/5                    RIGHT LE - HF 4/5, KE 3/5, DF 4/5, PF 4/5        Sensory - Intact to LT         Balance - WNL Static  Psychiatric - Mood stable, Affect WNL  ----------------------------------------------------------------------------------------  ASSESSMENT/PLAN  Patient is a 59yo F w/ AARON, peritonitis s/p Oral's procedure and ileostomy (reversed ), HTN, prior DVT/PE, ?Asthma admitted on 2020 to [facility],  transferred to [facility] for AHRF second to COVID 19, intubated on  complicated by R lung abscesses, multibacterial PNA and bacteremia, ARTEMIO s/p CRRT and HD, and s/p Tracheostomy .  now off contact precautions for c diff. formed bowel movements, no diarrhea. on lomotil prn  on Klonopin for anxiety per behavioral health  Pain - tylenol prn  htn: norvasc, lopressor  DVT PPX - rivaroxaban 20 milliGRAM(s) Oral with dinner  Diet:  dysphagia 2 mech soft thin liqids  continue bedside PT and OT  SLP  OOB as tolerates  turn and position q 2  Rehab -     Recommend acute inpatient rehab when medically cleared  patient can tolerate 3 hrs/day of therapy

## 2021-03-08 NOTE — PROGRESS NOTE ADULT - ASSESSMENT
61 YO F with PMHx of HTN, DVT on Xarelto, Peritonitis s/p Oral's Procedure and Ileostomy (reversed in 2011) and AARON who presented to [facility] on 11/18 for AHRF second to COVID 19, intubated on 11/23 complicated by lung abscesses on CT CHEST 12/5, multi-bacterial PNA and bacteremia, ARTEMIO s/p CRRT and HD, and s/p Tracheostomy 12/18. Transferred to RCU for further management, now transferred to . Pt now decannulated and PEG removed, eating dysphagia diet, pending dc to acute rehab.         # CDIFF (+)   - s/p PO Vancomycin 2/1-2/25  - Stool O&P and GI PCR NGTD, c/w Lomotil QID   - GI eval appreciated   -stool more formed as of 3/4, will need at least 48 hrs of formed stool prior to DC. Earliest pt would be able to dc'd is Saturday 3/6  - pending D/C to acute rehab       # ARDS second to COVID vs Multi-bacterial PNA/ R Lung Abscess  - s/p Intubated on 11/23 and c/b multi-bacterial PNA and lung abscesses.   - s/p Tracheostomy on 12/18 by CTSx. Sutures out 1/1  - s/p Air leak noted and s/p Bivona with CTSx 12/31. s/p Downsized to 6DCT 2/25  - s/p Successfully decannulated on 3/1, Now on 2LNC saturating well  - Proventil and Chest PT q6hr PRN      # ARTEMIO s/p CRRT and HD   - ARTEMIO now resolved and no longer required HD.   - Hypokalemia in setting of diarrhea/ CDIFF. Monitor electrolytes.   - Monitor renal function and avoid nephrotoxins.       # Sepsis second to COVID vs Multi-bacterial PNA/ Bacteremia and R Lung Abscess vs CDIFF (+)   - COVID with superimposed multi-bacterial VAP vs aspiration PNA vs cavitary PNA.   - SCx (11/24) MSSA and BCx (11/27) with MSSA and Proteus Bacteremia  - SCx (12/1, 12/12 and 12/27) with Stenotrophomonas.   - SCx (1/9) with  Pseudomonas and Stenotrophomonas   - Bcx has been persistently negative from 12/1, and most recently 1/12   - CT CHEST 12/4 with right lung cavitation.   - s/p multiple antibiotics, but now completed Fortaz and Flagyl (1/12-1/16)  - RPT CT CHEST 1/28 with improvement of right lung abscess   - SCx with  Pseudomonas and Stenotrophomonas and s/p TED/ Fortaz (1/28-2/6)      # DMII   - Continue on ISS   - FS well controlled      # Hx of DVT   - c/w Xarelto      # Contracted Wrists   - PT/OT working on strength   OMT at the bedside       # CODE STATUS - FULL CODE     # DISPO - PM&R recs ACUTE REHAB

## 2021-03-09 LAB
ALBUMIN SERPL ELPH-MCNC: 3.3 G/DL — SIGNIFICANT CHANGE UP (ref 3.3–5)
ALP SERPL-CCNC: 203 U/L — HIGH (ref 40–120)
ALT FLD-CCNC: 74 U/L — HIGH (ref 4–33)
ANION GAP SERPL CALC-SCNC: 13 MMOL/L — SIGNIFICANT CHANGE UP (ref 7–14)
AST SERPL-CCNC: 54 U/L — HIGH (ref 4–32)
BASOPHILS # BLD AUTO: 0.09 K/UL — SIGNIFICANT CHANGE UP (ref 0–0.2)
BASOPHILS NFR BLD AUTO: 0.9 % — SIGNIFICANT CHANGE UP (ref 0–2)
BILIRUB SERPL-MCNC: 0.3 MG/DL — SIGNIFICANT CHANGE UP (ref 0.2–1.2)
BUN SERPL-MCNC: 8 MG/DL — SIGNIFICANT CHANGE UP (ref 7–23)
CALCIUM SERPL-MCNC: 10.3 MG/DL — SIGNIFICANT CHANGE UP (ref 8.4–10.5)
CHLORIDE SERPL-SCNC: 103 MMOL/L — SIGNIFICANT CHANGE UP (ref 98–107)
CO2 SERPL-SCNC: 23 MMOL/L — SIGNIFICANT CHANGE UP (ref 22–31)
CREAT SERPL-MCNC: 0.41 MG/DL — LOW (ref 0.5–1.3)
EOSINOPHIL # BLD AUTO: 0.52 K/UL — HIGH (ref 0–0.5)
EOSINOPHIL NFR BLD AUTO: 5 % — SIGNIFICANT CHANGE UP (ref 0–6)
GLUCOSE SERPL-MCNC: 124 MG/DL — HIGH (ref 70–99)
HCT VFR BLD CALC: 44.2 % — SIGNIFICANT CHANGE UP (ref 34.5–45)
HGB BLD-MCNC: 12.1 G/DL — SIGNIFICANT CHANGE UP (ref 11.5–15.5)
IANC: 7.02 K/UL — SIGNIFICANT CHANGE UP (ref 1.5–8.5)
IMM GRANULOCYTES NFR BLD AUTO: 0.6 % — SIGNIFICANT CHANGE UP (ref 0–1.5)
LYMPHOCYTES # BLD AUTO: 19.8 % — SIGNIFICANT CHANGE UP (ref 13–44)
LYMPHOCYTES # BLD AUTO: 2.04 K/UL — SIGNIFICANT CHANGE UP (ref 1–3.3)
MAGNESIUM SERPL-MCNC: 1.6 MG/DL — SIGNIFICANT CHANGE UP (ref 1.6–2.6)
MCHC RBC-ENTMCNC: 26.4 PG — LOW (ref 27–34)
MCHC RBC-ENTMCNC: 27.4 GM/DL — LOW (ref 32–36)
MCV RBC AUTO: 96.5 FL — SIGNIFICANT CHANGE UP (ref 80–100)
MONOCYTES # BLD AUTO: 0.58 K/UL — SIGNIFICANT CHANGE UP (ref 0–0.9)
MONOCYTES NFR BLD AUTO: 5.6 % — SIGNIFICANT CHANGE UP (ref 2–14)
NEUTROPHILS # BLD AUTO: 7.02 K/UL — SIGNIFICANT CHANGE UP (ref 1.8–7.4)
NEUTROPHILS NFR BLD AUTO: 68.1 % — SIGNIFICANT CHANGE UP (ref 43–77)
NRBC # BLD: 0 /100 WBCS — SIGNIFICANT CHANGE UP
NRBC # FLD: 0 K/UL — SIGNIFICANT CHANGE UP
PHOSPHATE SERPL-MCNC: 4.5 MG/DL — SIGNIFICANT CHANGE UP (ref 2.5–4.5)
PLATELET # BLD AUTO: 334 K/UL — SIGNIFICANT CHANGE UP (ref 150–400)
POTASSIUM SERPL-MCNC: 4.1 MMOL/L — SIGNIFICANT CHANGE UP (ref 3.5–5.3)
POTASSIUM SERPL-SCNC: 4.1 MMOL/L — SIGNIFICANT CHANGE UP (ref 3.5–5.3)
PROT SERPL-MCNC: 7.5 G/DL — SIGNIFICANT CHANGE UP (ref 6–8.3)
RBC # BLD: 4.58 M/UL — SIGNIFICANT CHANGE UP (ref 3.8–5.2)
RBC # FLD: 16.4 % — HIGH (ref 10.3–14.5)
SODIUM SERPL-SCNC: 139 MMOL/L — SIGNIFICANT CHANGE UP (ref 135–145)
WBC # BLD: 10.31 K/UL — SIGNIFICANT CHANGE UP (ref 3.8–10.5)
WBC # FLD AUTO: 10.31 K/UL — SIGNIFICANT CHANGE UP (ref 3.8–10.5)

## 2021-03-09 PROCEDURE — 99232 SBSQ HOSP IP/OBS MODERATE 35: CPT

## 2021-03-09 RX ORDER — MAGNESIUM SULFATE 500 MG/ML
2 VIAL (ML) INJECTION ONCE
Refills: 0 | Status: COMPLETED | OUTPATIENT
Start: 2021-03-09 | End: 2021-03-09

## 2021-03-09 RX ADMIN — ZINC SULFATE TAB 220 MG (50 MG ZINC EQUIVALENT) 220 MILLIGRAM(S): 220 (50 ZN) TAB at 13:22

## 2021-03-09 RX ADMIN — NYSTATIN CREAM 1 APPLICATION(S): 100000 CREAM TOPICAL at 22:15

## 2021-03-09 RX ADMIN — Medication 1 APPLICATION(S): at 18:40

## 2021-03-09 RX ADMIN — Medication 1 TABLET(S): at 13:52

## 2021-03-09 RX ADMIN — ALBUTEROL 2 PUFF(S): 90 AEROSOL, METERED ORAL at 13:49

## 2021-03-09 RX ADMIN — RIVAROXABAN 20 MILLIGRAM(S): KIT at 18:41

## 2021-03-09 RX ADMIN — NYSTATIN CREAM 1 APPLICATION(S): 100000 CREAM TOPICAL at 06:51

## 2021-03-09 RX ADMIN — AMLODIPINE BESYLATE 5 MILLIGRAM(S): 2.5 TABLET ORAL at 06:51

## 2021-03-09 RX ADMIN — Medication 50 MILLIGRAM(S): at 18:40

## 2021-03-09 RX ADMIN — ALBUTEROL 2 PUFF(S): 90 AEROSOL, METERED ORAL at 07:23

## 2021-03-09 RX ADMIN — Medication 0.5 MILLIGRAM(S): at 13:49

## 2021-03-09 RX ADMIN — CHOLESTYRAMINE 4 GRAM(S): 4 POWDER, FOR SUSPENSION ORAL at 22:15

## 2021-03-09 RX ADMIN — Medication 50 GRAM(S): at 12:53

## 2021-03-09 RX ADMIN — Medication 1 TABLET(S): at 06:51

## 2021-03-09 RX ADMIN — NYSTATIN CREAM 1 APPLICATION(S): 100000 CREAM TOPICAL at 13:50

## 2021-03-09 RX ADMIN — CHLORHEXIDINE GLUCONATE 1 APPLICATION(S): 213 SOLUTION TOPICAL at 13:49

## 2021-03-09 RX ADMIN — Medication 1 TABLET(S): at 18:41

## 2021-03-09 RX ADMIN — CHOLESTYRAMINE 4 GRAM(S): 4 POWDER, FOR SUSPENSION ORAL at 13:23

## 2021-03-09 RX ADMIN — ALBUTEROL 2 PUFF(S): 90 AEROSOL, METERED ORAL at 18:40

## 2021-03-09 RX ADMIN — PANTOPRAZOLE SODIUM 40 MILLIGRAM(S): 20 TABLET, DELAYED RELEASE ORAL at 13:50

## 2021-03-09 RX ADMIN — Medication 1 APPLICATION(S): at 06:52

## 2021-03-09 RX ADMIN — Medication 50 MILLIGRAM(S): at 06:51

## 2021-03-09 RX ADMIN — ALBUTEROL 2 PUFF(S): 90 AEROSOL, METERED ORAL at 22:15

## 2021-03-09 NOTE — PROGRESS NOTE ADULT - SUBJECTIVE AND OBJECTIVE BOX
DATE OF SERVICE: 03-09-21 @ 18:14    Subjective: Patient seen and examined. No new events except as noted.   doing okay   pending rehab placement however repeat COVID positive     REVIEW OF SYSTEMS:    CONSTITUTIONAL: No weakness, fevers or chills  EYES/ENT: No visual changes;  No vertigo or throat pain   NECK: No pain or stiffness  RESPIRATORY: No cough, wheezing, hemoptysis; No shortness of breath  CARDIOVASCULAR: No chest pain or palpitations  GASTROINTESTINAL: No abdominal or epigastric pain. No nausea, vomiting, or hematemesis; No diarrhea or constipation. No melena or hematochezia.  GENITOURINARY: No dysuria, frequency or hematuria  NEUROLOGICAL: No numbness or weakness  SKIN: No itching, burning, rashes, or lesions   All other review of systems is negative unless indicated above.    MEDICATIONS:  MEDICATIONS  (STANDING):  ALBUTerol    90 MICROgram(s) HFA Inhaler 2 Puff(s) Inhalation every 6 hours  amLODIPine   Tablet 5 milliGRAM(s) Oral daily  chlorhexidine 4% Liquid 1 Application(s) Topical daily  cholestyramine Powder (Sugar-Free) 4 Gram(s) Oral two times a day  clonazePAM  Tablet 0.5 milliGRAM(s) Oral daily  collagenase Ointment 1 Application(s) Topical two times a day  dextrose 40% Gel 15 Gram(s) Oral once  dextrose 5%. 1000 milliLiter(s) (50 mL/Hr) IV Continuous <Continuous>  dextrose 5%. 1000 milliLiter(s) (100 mL/Hr) IV Continuous <Continuous>  dextrose 5%. 1000 milliLiter(s) (75 mL/Hr) IV Continuous <Continuous>  dextrose 50% Injectable 25 Gram(s) IV Push once  dextrose 50% Injectable 12.5 Gram(s) IV Push once  dextrose 50% Injectable 25 Gram(s) IV Push once  glucagon  Injectable 1 milliGRAM(s) IntraMuscular once  insulin lispro (ADMELOG) corrective regimen sliding scale   SubCutaneous Before meals and at bedtime  lactobacillus acidophilus 1 Tablet(s) Oral two times a day  metoprolol tartrate 50 milliGRAM(s) Oral two times a day  multivitamin 1 Tablet(s) Oral daily  nystatin Powder 1 Application(s) Topical every 8 hours  pantoprazole  Injectable 40 milliGRAM(s) IV Push daily  rivaroxaban 20 milliGRAM(s) Oral with dinner  zinc sulfate 220 milliGRAM(s) Oral daily      PHYSICAL EXAM:  T(C): 36.9 (03-09-21 @ 12:15), Max: 36.9 (03-09-21 @ 06:48)  HR: 87 (03-09-21 @ 12:15) (83 - 96)  BP: 137/76 (03-09-21 @ 12:15) (137/76 - 155/74)  RR: 18 (03-09-21 @ 12:15) (18 - 18)  SpO2: 100% (03-09-21 @ 12:15) (98% - 100%)  Wt(kg): --  I&O's Summary        Appearance: Normal	  HEENT:  PERRLA   Lymphatic: No lymphadenopathy   Cardiovascular: Normal S1 S2, no JVD  Respiratory: normal effort , clear  Gastrointestinal:  Soft, Non-tender  Skin: No rashes,  warm to touch  Psychiatry:  Mood & affect appropriate  Musculuskeletal: No edema      All labs, Imaging and EKGs personally reviewed                           12.1   10.31 )-----------( 334      ( 09 Mar 2021 06:38 )             44.2               03-09    139  |  103  |  8   ----------------------------<  124<H>  4.1   |  23  |  0.41<L>    Ca    10.3      09 Mar 2021 06:38  Phos  4.5     03-09  Mg     1.6     03-09    TPro  7.5  /  Alb  3.3  /  TBili  0.3  /  DBili  x   /  AST  54<H>  /  ALT  74<H>  /  AlkPhos  203<H>  03-09

## 2021-03-09 NOTE — PROGRESS NOTE ADULT - ASSESSMENT
60 year old female with prolonged hospital course secondary to COVID 19, GI consulted for persistent diarrhea     Problem/Recommendation - 1:  Problem: COVID-19. Recommendation: status post treatment by infectious disease  now off of isolation.  pending dispo to acute rehab.     Problem/Recommendation - 2:  ·  Problem: Respiratory failure.  Recommendation: status post tracheostomy, now decannulated, on room air.     Problem/Recommendation - 3:  ·  Problem: Diarrhea.  Recommendation: now improved, was most likely due to tube feeds. Now tolerating regular food.  -will scale back lomotil to PRN as it is unlikely to be necessary at this time, will continue questran but can titrate as needed.     Problem/Recommendation - 4:  ·  Problem: Hernia, incisional.  Recommendation: reducible, nontender. No signs of incarceration.   -serial abdominal exams.      Problem/Recommendation - 5:  ·  Problem: Gastrostomy present.  Recommendation: patient tolerating good PO intake. Passed swallow eval. Gastrostomy removed at bedside and bandaged.     Problem/Recommendation - 6:  Problem: ARTEMIO (acute kidney injury). Recommendation: s/p CVVHD, now improved.     Problem/Recommendation - 7:  Problem: History of pressure ulcer. Recommendation: per RCU team.     Problem/Recommendation - 8:  Problem: Clostridium difficile infection. Recommendation: status post 2 weeks of PO vanco  -vanco per ID on hold.     Problem/Recommendation - 9:  Problem: Abnormal LFTs, likely artifact given hemolyzed specimen  -can repeat tomorrow    Attending Attestation:   Differential diagnosis and plan of care discussed with patient after the evaluation  75 Minutes spent on total encounter of which more than fifty percent of the encounter was spent counseling and/or coordinating care by the attending physician.      Ranjeet Ponce M.D.   Gastroenterology and Hepatology  Cell: [phone].

## 2021-03-09 NOTE — PROGRESS NOTE ADULT - SUBJECTIVE AND OBJECTIVE BOX
Patient is a 60y old  Female who presents with a chief complaint of Transfer from [facility] (08 Mar 2021 14:36)      HPI:  Patient is a 59yo F w/ AARON, peritonitis s/p Oral's procedure and ileostomy (reversed ), HTN, prior DVT/PE, ?Asthma admitted on 2020 to [facility] after presenting for  productive cough  w/ SOB x9 days and diarrhea x7 days and fever x1 day. Patient was found to be COVID + on 2020 and completed remdesivir -. Patient was intubated for airway protection on 2020. Patient has required proning (last 2020). Hospital course c/b by ATN, requiring CVVHD now on intermittent HD, unresponsive to bumex challenge last HD . Course also c/b Stenotrophomonas (, completed Levaquin x7 days), MSSA/P. mirablis bacteremia (on Cefazolin, potentially due to urine vs line-associated and MSSA in sputum due to PNA), also with lung abscesses on CT chest on .  Patient currently on Volume AC 30/350/8/35% on Precedex. Patient intermittently opens eyes but is not yet responsive. Transferred over to [facility] on 12/10 to offload [facility] COVID ICU    (10 Dec 2020 14:13)    covid test for discharge planning was positive. patient denies new symptoms.     REVIEW OF SYSTEMS  Constitutional - No fever, No weight loss, No fatigue  HEENT - No eye pain, No visual disturbances, No difficulty hearing, No tinnitus, No vertigo, No neck pain  Respiratory - No cough, No wheezing, No shortness of breath  Cardiovascular - No chest pain, No palpitations  Gastrointestinal - No abdominal pain, No nausea, No vomiting, No diarrhea, No constipation  Genitourinary - No dysuria, No frequency, No hematuria, No incontinence  Neurological - No headaches, No memory loss, + loss of strength, No numbness, No tremors  Skin - No itching, No rashes, No lesions   Endocrine - No temperature intolerance  Musculoskeletal - No joint pain, No joint swelling, No muscle pain  Psychiatric - No depression, No anxiety    PAST MEDICAL & SURGICAL HISTORY  Pneumomediastinum    Umbilical hernia    Osteoarthritis of both knees, unspecified osteoarthritis type    AARON (Obstructive Sleep Apnea)    Pneumonia    S/P Colon Resection    pt with allergies to perfumes and addidtive to soaps, requires hypoallergenic sheets and gowns, line    Cervical Dysplasia    Latex Sensitivity    GERD (Gastroesophageal Reflux Disease)    Asthma    Anemia    S/P Joint Replacement    DVT (Deep Venous Thrombosis)    HTN (Hypertension)    H/O ileostomy    S/P LEEP    S/P Exploratory Laparotomy    S/P Colonoscopy    S/P Endoscopy    S/P  Section    History of Tubal Ligation    S/P Knee Surgery    Umbilical Hernia    Osteoarthritis of Knee    HTN (Hypertension)         CURRENT FUNCTIONAL STATUS  3/8   Bed Mobility  Bed Mobility Training Rehab Potential: good, to achieve stated therapy goals  Bed Mobility Training Scooting: maximum assist (25% patient effort);  2 person assist;  nonverbal cues (demo/gestures);  verbal cues;  set-up required;  hand over hand  Bed Mobility Training Sit-to-Supine: maximum assist (25% patient effort);  2 person assist;  nonverbal cues (demo/gestures);  verbal cues;  set-up required;  hand over hand  Bed Mobility Training Supine-to-Sit: maximum assist (25% patient effort);  2 person assist;  nonverbal cues (demo/gestures);  verbal cues;  set-up required;  hand over hand  Bed Mobility Training Limitations: decreased ability to use legs for bridging/pushing;  impaired ability to control trunk for mobility;  decreased strength;  impaired balance;  impaired postural control    Therapeutic Exercise  Therapeutic Exercise Rehab Effort: good  Therapeutic Exercise Detail: Ther ex of LE included ankle pumps, knee extension, hip flexion. Pt instructed to perform as able throughout day. Pt verbalized understanding of therapeutic exercises.         FAMILY HISTORY   Family history of gastric cancer  Family history of breast cancer (Grandparent)        RECENT LABS/IMAGING  CBC Full  -  ( 09 Mar 2021 06:38 )  WBC Count : 10.31 K/uL  RBC Count : 4.58 M/uL  Hemoglobin : 12.1 g/dL  Hematocrit : 44.2 %  Platelet Count - Automated : 334 K/uL  Mean Cell Volume : 96.5 fL  Mean Cell Hemoglobin : 26.4 pg  Mean Cell Hemoglobin Concentration : 27.4 gm/dL  Auto Neutrophil # : 7.02 K/uL  Auto Lymphocyte # : 2.04 K/uL  Auto Monocyte # : 0.58 K/uL  Auto Eosinophil # : 0.52 K/uL  Auto Basophil # : 0.09 K/uL  Auto Neutrophil % : 68.1 %  Auto Lymphocyte % : 19.8 %  Auto Monocyte % : 5.6 %  Auto Eosinophil % : 5.0 %  Auto Basophil % : 0.9 %        139  |  103  |  8   ----------------------------<  124<H>  4.1   |  23  |  0.41<L>    Ca    10.3      09 Mar 2021 06:38  Phos  4.5       Mg     1.6         TPro  7.5  /  Alb  3.3  /  TBili  0.3  /  DBili  x   /  AST  54<H>  /  ALT  74<H>  /  AlkPhos  203<H>          VITALS  T(C): 36.9 (21 @ 12:15), Max: 36.9 (21 @ 06:48)  HR: 87 (21 @ 12:15) (83 - 96)  BP: 137/76 (21 @ 12:15) (137/76 - 155/74)  RR: 18 (21 @ 12:15) (18 - 18)  SpO2: 100% (21 @ 12:15) (98% - 100%)  Wt(kg): --    ALLERGIES  Kiwi (Unknown)  latex (Anaphylaxis)  latex (Unknown)  penicillin (Other)  penicillin (Unknown)  perfume  hives (Other)  potassium acetate (Other)  soap additives hives (Other)      MEDICATIONS   acetaminophen    Suspension .. 650 milliGRAM(s) Oral every 6 hours PRN  ALBUTerol    90 MICROgram(s) HFA Inhaler 2 Puff(s) Inhalation every 6 hours  amLODIPine   Tablet 5 milliGRAM(s) Oral daily  artificial tears (preservative free) Ophthalmic Solution 1 Drop(s) Both EYES every 8 hours PRN  chlorhexidine 4% Liquid 1 Application(s) Topical daily  cholestyramine Powder (Sugar-Free) 4 Gram(s) Oral two times a day  clonazePAM  Tablet 0.5 milliGRAM(s) Oral daily  collagenase Ointment 1 Application(s) Topical two times a day  dextrose 40% Gel 15 Gram(s) Oral once  dextrose 5%. 1000 milliLiter(s) IV Continuous <Continuous>  dextrose 5%. 1000 milliLiter(s) IV Continuous <Continuous>  dextrose 5%. 1000 milliLiter(s) IV Continuous <Continuous>  dextrose 50% Injectable 25 Gram(s) IV Push once  dextrose 50% Injectable 12.5 Gram(s) IV Push once  dextrose 50% Injectable 25 Gram(s) IV Push once  diphenoxylate/atropine 1 Tablet(s) Oral four times a day PRN  glucagon  Injectable 1 milliGRAM(s) IntraMuscular once  guaiFENesin   Syrup  (Sugar-Free) 100 milliGRAM(s) Oral every 6 hours PRN  insulin lispro (ADMELOG) corrective regimen sliding scale   SubCutaneous Before meals and at bedtime  lactobacillus acidophilus 1 Tablet(s) Oral two times a day  metoprolol tartrate 50 milliGRAM(s) Oral two times a day  multivitamin 1 Tablet(s) Oral daily  nystatin Powder 1 Application(s) Topical every 8 hours  pantoprazole  Injectable 40 milliGRAM(s) IV Push daily  rivaroxaban 20 milliGRAM(s) Oral with dinner  zinc sulfate 220 milliGRAM(s) Oral daily      ----------------------------------------------------------------------------------------    PHYSICAL EXAM  Constitutional - NAD, Comfortable   HEENT - NCAT, EOMI,  dressing over decannulated trach site,  + nc  Chest - no respiratory distress  Cardiovascular - RRR, S1S2   Abdomen -  Soft, NTND   Extremities - No C/C/E, No calf tenderness   Neurologic Exam -                    Cognitive - Awake, Alert, AAO to self, place, date, year, situation     Communication - Fluent, No dysarthria     Cranial Nerves - CN 2-12 intact     Motor -                      LEFT    UE - ShAB 4/5, EF 1/5, EE 3/5, WE 0/5,  0/5                    RIGHT UE - ShAB 4/5, EF 2/5, EE 3/5, WE 1/5,  2/5                    LEFT    LE - HF 2/5, KE 3/5, DF 1/5, PF 4/5                    RIGHT LE - HF 4/5, KE 3/5, DF 4/5, PF 4/5        Sensory - Intact to LT         Balance - WNL Static  Psychiatric - Mood stable, Affect WNL  ----------------------------------------------------------------------------------------  ASSESSMENT/PLAN  Patient is a 59yo F w/ AARON, peritonitis s/p Oral's procedure and ileostomy (reversed ), HTN, prior DVT/PE, ?Asthma admitted on 2020 to [facility],  transferred to [facility] for AHRF second to COVID 19, intubated on  complicated by R lung abscesses, multibacterial PNA and bacteremia, ARTEMIO s/p CRRT and HD, and s/p Tracheostomy .  now off contact precautions for c diff. formed bowel movements, no diarrhea. on lomotil prn  on Klonopin for anxiety per behavioral health  Pain - tylenol prn  htn: norvasc, lopressor  DVT PPX - rivaroxaban 20 milliGRAM(s) Oral with dinner  Diet:  dysphagia 2 mech soft thin liqids  continue bedside PT and OT  SLP  OOB as tolerates  turn and position q 2  Rehab -     Recommend acute inpatient rehab when medically cleared  patient can tolerate 3 hrs/day of therapy

## 2021-03-09 NOTE — PROGRESS NOTE ADULT - ASSESSMENT
59 YO F with PMHx of HTN, DVT on Xarelto, Peritonitis s/p Oral's Procedure and Ileostomy (reversed in 2011) and AARON who presented to [facility] on 11/18 for AHRF second to COVID 19, intubated on 11/23 complicated by lung abscesses on CT CHEST 12/5, multi-bacterial PNA and bacteremia, ARTEMIO s/p CRRT and HD, and s/p Tracheostomy 12/18. Transferred to RCU for further management, now transferred to . Pt now decannulated and PEG removed, eating dysphagia diet, pending dc to acute rehab.         # CDIFF (+)   - s/p PO Vancomycin 2/1-2/25  - Stool O&P and GI PCR NGTD, c/w Lomotil QID   - GI eval appreciated   -stool more formed as of 3/4, will need at least 48 hrs of formed stool prior to DC. Earliest pt would be able to dc'd is Saturday 3/6  - pending D/C to acute rehab, on hold due to repeat covid positive   - Isolation and quarantine per protocol       # ARDS second to COVID vs Multi-bacterial PNA/ R Lung Abscess  - s/p Intubated on 11/23 and c/b multi-bacterial PNA and lung abscesses.   - s/p Tracheostomy on 12/18 by CTSx. Sutures out 1/1  - s/p Air leak noted and s/p Bivona with CTSx 12/31. s/p Downsized to 6DCT 2/25  - s/p Successfully decannulated on 3/1, Now on 2LNC saturating well  - Proventil and Chest PT q6hr PRN      # ARTEMIO s/p CRRT and HD   - ARTEMIO now resolved and no longer required HD.   - Hypokalemia in setting of diarrhea/ CDIFF. Monitor electrolytes.   - Monitor renal function and avoid nephrotoxins.       # Sepsis second to COVID vs Multi-bacterial PNA/ Bacteremia and R Lung Abscess vs CDIFF (+)   - COVID with superimposed multi-bacterial VAP vs aspiration PNA vs cavitary PNA.   - SCx (11/24) MSSA and BCx (11/27) with MSSA and Proteus Bacteremia  - SCx (12/1, 12/12 and 12/27) with Stenotrophomonas.   - SCx (1/9) with  Pseudomonas and Stenotrophomonas   - Bcx has been persistently negative from 12/1, and most recently 1/12   - CT CHEST 12/4 with right lung cavitation.   - s/p multiple antibiotics, but now completed Fortaz and Flagyl (1/12-1/16)  - RPT CT CHEST 1/28 with improvement of right lung abscess   - SCx with  Pseudomonas and Stenotrophomonas and s/p TED/ Fortaz (1/28-2/6)      # DMII   - Continue on ISS   - FS well controlled      # Hx of DVT   - c/w Xarelto      # Contracted Wrists   - PT/OT working on strength   OMT at the bedside       # CODE STATUS - FULL CODE     # DISPO - PM&R recs ACUTE REHAB

## 2021-03-09 NOTE — PROGRESS NOTE ADULT - SUBJECTIVE AND OBJECTIVE BOX
Chief Complaint:  Patient is a 60y old  Female who presents with a chief complaint of Transfer from [facility] (05 Mar 2021 12:54)      Interval Events:   no diarrhea  no abdominal pain  eating well    Allergies:  Kiwi (Unknown)  latex (Anaphylaxis)  latex (Unknown)  penicillin (Other)  penicillin (Unknown)  perfume  hives (Other)  potassium acetate (Other)  soap additives hives (Other)      Hospital Medications:  acetaminophen    Suspension .. 650 milliGRAM(s) Oral every 6 hours PRN  ALBUTerol    90 MICROgram(s) HFA Inhaler 2 Puff(s) Inhalation every 6 hours  amLODIPine   Tablet 5 milliGRAM(s) Oral daily  artificial tears (preservative free) Ophthalmic Solution 1 Drop(s) Both EYES every 8 hours PRN  chlorhexidine 4% Liquid 1 Application(s) Topical daily  cholestyramine Powder (Sugar-Free) 4 Gram(s) Oral two times a day  clonazePAM  Tablet 0.5 milliGRAM(s) Oral daily  collagenase Ointment 1 Application(s) Topical two times a day  dextrose 40% Gel 15 Gram(s) Oral once  dextrose 5%. 1000 milliLiter(s) IV Continuous <Continuous>  dextrose 5%. 1000 milliLiter(s) IV Continuous <Continuous>  dextrose 5%. 1000 milliLiter(s) IV Continuous <Continuous>  dextrose 50% Injectable 25 Gram(s) IV Push once  dextrose 50% Injectable 12.5 Gram(s) IV Push once  dextrose 50% Injectable 25 Gram(s) IV Push once  diphenoxylate/atropine 1 Tablet(s) Oral four times a day PRN  glucagon  Injectable 1 milliGRAM(s) IntraMuscular once  guaiFENesin   Syrup  (Sugar-Free) 100 milliGRAM(s) Oral every 6 hours PRN  insulin lispro (ADMELOG) corrective regimen sliding scale   SubCutaneous Before meals and at bedtime  lactobacillus acidophilus 1 Tablet(s) Oral two times a day  metoprolol tartrate 50 milliGRAM(s) Oral two times a day  multivitamin 1 Tablet(s) Oral daily  nystatin Powder 1 Application(s) Topical every 8 hours  pantoprazole  Injectable 40 milliGRAM(s) IV Push daily  rivaroxaban 20 milliGRAM(s) Oral with dinner  Viokase 10,440 units 1 Tablet(s) 1 Tablet(s) Enteral Tube once  zinc sulfate 220 milliGRAM(s) Oral daily      PMHX/PSHX:  Pneumomediastinum    Umbilical hernia    Osteoarthritis of both knees, unspecified osteoarthritis type    AARON (Obstructive Sleep Apnea)    Pneumonia    S/P Colon Resection    pt with allergies to perfumes and addidtive to soaps, requires hypoallergenic sheets and gowns, line    Cervical Dysplasia    Latex Sensitivity    GERD (Gastroesophageal Reflux Disease)    Asthma    Anemia    S/P Joint Replacement    DVT (Deep Venous Thrombosis)    HTN (Hypertension)    H/O ileostomy    S/P LEEP    S/P Exploratory Laparotomy    S/P Colonoscopy    S/P Endoscopy    S/P  Section    History of Tubal Ligation    S/P Knee Surgery    Umbilical Hernia    Osteoarthritis of Knee    HTN (Hypertension)        Family history:  Family history of gastric cancer    Family history of breast cancer (Grandparent)        ROS:     General:  No wt loss, fevers, chills, night sweats, fatigue,   Eyes:  Good vision, no reported pain  ENT:  No sore throat, pain, runny nose, dysphagia  CV:  No pain, palpitations, hypo/hypertension  Resp:  No dyspnea, cough, tachypnea, wheezing  GI:  See HPI  :  No pain, bleeding, incontinence, nocturia  Muscle:  No pain, weakness  Neuro:  + weakness, no tingling, memory problems  Psych:  No fatigue, insomnia, mood problems, depression  Endocrine:  No polyuria, polydipsia, cold/heat intolerance  Heme:  No petechiae, ecchymosis, easy bruisability  Skin:  No rash, edema      PHYSICAL EXAM:     GENERAL:  Appears stated age, well-groomed, well-nourished, no distress  HEENT:  NC/AT,  conjunctivae clear, sclera-anicteric  NECK: Trachea midline, supple  CHEST:  Full & symmetric excursion, no increased effort, breath sounds clear  HEART:  Regular rhythm, no lola/heave  ABDOMEN:  Soft, non-tender, non-distended, normoactive bowel sounds,  no masses ,no hepato-splenomegaly, soft reducible hernia, gastrostomy stoma closed  EXTREMITIES:  no cyanosis,clubbing or edema, contracture LUE  SKIN:  No rash/erythema/petechiae, no jaundice  NEURO:  Alert, oriented, no asterixis, generalized weakness  RECTAL: Deferred    ICU Vital Signs Last 24 Hrs  T(C): 36.7 (09 Mar 2021 18:39), Max: 36.9 (09 Mar 2021 06:48)  T(F): 98.1 (09 Mar 2021 18:39), Max: 98.4 (09 Mar 2021 06:48)  HR: 100 (09 Mar 2021 18:39) (83 - 100)  BP: 144/70 (09 Mar 2021 18:39) (137/76 - 155/74)  BP(mean): --  ABP: --  ABP(mean): --  RR: 18 (09 Mar 2021 18:39) (18 - 18)  SpO2: 100% (09 Mar 2021 18:39) (98% - 100%)        139  |  103  |  8   ----------------------------<  124<H>  4.1   |  23  |  0.41<L>    Ca    10.3      09 Mar 2021 06:38  Phos  4.5       Mg     1.6         TPro  7.5  /  Alb  3.3  /  TBili  0.3  /  DBili  x   /  AST  54<H>  /  ALT  74<H>  /  AlkPhos  203<H>      CBC Full  -  ( 09 Mar 2021 06:38 )  WBC Count : 10.31 K/uL  RBC Count : 4.58 M/uL  Hemoglobin : 12.1 g/dL  Hematocrit : 44.2 %  Platelet Count - Automated : 334 K/uL  Mean Cell Volume : 96.5 fL  Mean Cell Hemoglobin : 26.4 pg  Mean Cell Hemoglobin Concentration : 27.4 gm/dL  Auto Neutrophil # : 7.02 K/uL  Auto Lymphocyte # : 2.04 K/uL  Auto Monocyte # : 0.58 K/uL  Auto Eosinophil # : 0.52 K/uL  Auto Basophil # : 0.09 K/uL  Auto Neutrophil % : 68.1 %  Auto Lymphocyte % : 19.8 %  Auto Monocyte % : 5.6 %  Auto Eosinophil % : 5.0 %  Auto Basophil % : 0.9 %

## 2021-03-10 RX ADMIN — Medication 1 TABLET(S): at 17:37

## 2021-03-10 RX ADMIN — ALBUTEROL 2 PUFF(S): 90 AEROSOL, METERED ORAL at 17:35

## 2021-03-10 RX ADMIN — ALBUTEROL 2 PUFF(S): 90 AEROSOL, METERED ORAL at 06:02

## 2021-03-10 RX ADMIN — Medication 1 APPLICATION(S): at 17:37

## 2021-03-10 RX ADMIN — NYSTATIN CREAM 1 APPLICATION(S): 100000 CREAM TOPICAL at 06:02

## 2021-03-10 RX ADMIN — Medication 50 MILLIGRAM(S): at 06:02

## 2021-03-10 RX ADMIN — CHLORHEXIDINE GLUCONATE 1 APPLICATION(S): 213 SOLUTION TOPICAL at 12:14

## 2021-03-10 RX ADMIN — Medication 50 MILLIGRAM(S): at 17:38

## 2021-03-10 RX ADMIN — NYSTATIN CREAM 1 APPLICATION(S): 100000 CREAM TOPICAL at 21:11

## 2021-03-10 RX ADMIN — NYSTATIN CREAM 1 APPLICATION(S): 100000 CREAM TOPICAL at 12:17

## 2021-03-10 RX ADMIN — AMLODIPINE BESYLATE 5 MILLIGRAM(S): 2.5 TABLET ORAL at 06:02

## 2021-03-10 RX ADMIN — CHOLESTYRAMINE 4 GRAM(S): 4 POWDER, FOR SUSPENSION ORAL at 08:49

## 2021-03-10 RX ADMIN — ZINC SULFATE TAB 220 MG (50 MG ZINC EQUIVALENT) 220 MILLIGRAM(S): 220 (50 ZN) TAB at 12:16

## 2021-03-10 RX ADMIN — Medication 650 MILLIGRAM(S): at 13:06

## 2021-03-10 RX ADMIN — PANTOPRAZOLE SODIUM 40 MILLIGRAM(S): 20 TABLET, DELAYED RELEASE ORAL at 12:16

## 2021-03-10 RX ADMIN — Medication 1 TABLET(S): at 17:36

## 2021-03-10 RX ADMIN — Medication 1 DROP(S): at 17:37

## 2021-03-10 RX ADMIN — Medication 0.5 MILLIGRAM(S): at 12:16

## 2021-03-10 RX ADMIN — RIVAROXABAN 20 MILLIGRAM(S): KIT at 17:36

## 2021-03-10 RX ADMIN — CHOLESTYRAMINE 4 GRAM(S): 4 POWDER, FOR SUSPENSION ORAL at 21:12

## 2021-03-10 RX ADMIN — ALBUTEROL 2 PUFF(S): 90 AEROSOL, METERED ORAL at 23:14

## 2021-03-10 RX ADMIN — Medication 1 TABLET(S): at 06:02

## 2021-03-10 RX ADMIN — Medication 1 APPLICATION(S): at 06:02

## 2021-03-10 RX ADMIN — ALBUTEROL 2 PUFF(S): 90 AEROSOL, METERED ORAL at 12:14

## 2021-03-10 NOTE — PROGRESS NOTE ADULT - SUBJECTIVE AND OBJECTIVE BOX
DATE OF SERVICE: 03-10-21 @ 16:08    Subjective: Patient seen and examined. No new events except as noted.   doing okay   afebrile  tolerating oral feeding     REVIEW OF SYSTEMS:    CONSTITUTIONAL: No weakness, fevers or chills  EYES/ENT: No visual changes;  No vertigo or throat pain   NECK: No pain or stiffness  RESPIRATORY: No cough, wheezing, hemoptysis; No shortness of breath  CARDIOVASCULAR: No chest pain or palpitations  GASTROINTESTINAL: No abdominal or epigastric pain. No nausea, vomiting, or hematemesis; No diarrhea or constipation. No melena or hematochezia.  GENITOURINARY: No dysuria, frequency or hematuria  NEUROLOGICAL: No numbness or weakness  SKIN: No itching, burning, rashes, or lesions   All other review of systems is negative unless indicated above.    MEDICATIONS:  MEDICATIONS  (STANDING):  ALBUTerol    90 MICROgram(s) HFA Inhaler 2 Puff(s) Inhalation every 6 hours  amLODIPine   Tablet 5 milliGRAM(s) Oral daily  chlorhexidine 4% Liquid 1 Application(s) Topical daily  cholestyramine Powder (Sugar-Free) 4 Gram(s) Oral two times a day  clonazePAM  Tablet 0.5 milliGRAM(s) Oral daily  collagenase Ointment 1 Application(s) Topical two times a day  dextrose 40% Gel 15 Gram(s) Oral once  dextrose 5%. 1000 milliLiter(s) (50 mL/Hr) IV Continuous <Continuous>  dextrose 5%. 1000 milliLiter(s) (100 mL/Hr) IV Continuous <Continuous>  dextrose 5%. 1000 milliLiter(s) (75 mL/Hr) IV Continuous <Continuous>  dextrose 50% Injectable 25 Gram(s) IV Push once  dextrose 50% Injectable 12.5 Gram(s) IV Push once  dextrose 50% Injectable 25 Gram(s) IV Push once  glucagon  Injectable 1 milliGRAM(s) IntraMuscular once  insulin lispro (ADMELOG) corrective regimen sliding scale   SubCutaneous Before meals and at bedtime  lactobacillus acidophilus 1 Tablet(s) Oral two times a day  metoprolol tartrate 50 milliGRAM(s) Oral two times a day  multivitamin 1 Tablet(s) Oral daily  nystatin Powder 1 Application(s) Topical every 8 hours  pantoprazole  Injectable 40 milliGRAM(s) IV Push daily  rivaroxaban 20 milliGRAM(s) Oral with dinner  zinc sulfate 220 milliGRAM(s) Oral daily      PHYSICAL EXAM:  T(C): 36.7 (03-10-21 @ 10:22), Max: 36.9 (03-09-21 @ 22:13)  HR: 84 (03-10-21 @ 10:22) (84 - 100)  BP: 141/68 (03-10-21 @ 10:22) (136/65 - 153/79)  RR: 17 (03-10-21 @ 10:22) (17 - 18)  SpO2: 98% (03-10-21 @ 10:22) (98% - 100%)  Wt(kg): --  I&O's Summary    10 Mar 2021 07:01  -  10 Mar 2021 16:08  --------------------------------------------------------  IN: 0 mL / OUT: 300 mL / NET: -300 mL          Appearance: Normal	  HEENT:  PERRLA   Lymphatic: No lymphadenopathy   Cardiovascular: Normal S1 S2, no JVD  Respiratory: normal effort , clear  Gastrointestinal:  Soft, Non-tender  Skin: No rashes,  warm to touch  Psychiatry:  Mood & affect appropriate  Musculuskeletal: No edema      All labs, Imaging and EKGs personally reviewed                           12.1   10.31 )-----------( 334      ( 09 Mar 2021 06:38 )             44.2               03-09    139  |  103  |  8   ----------------------------<  124<H>  4.1   |  23  |  0.41<L>    Ca    10.3      09 Mar 2021 06:38  Phos  4.5     03-09  Mg     1.6     03-09    TPro  7.5  /  Alb  3.3  /  TBili  0.3  /  DBili  x   /  AST  54<H>  /  ALT  74<H>  /  AlkPhos  203<H>  03-09

## 2021-03-10 NOTE — PROGRESS NOTE ADULT - SUBJECTIVE AND OBJECTIVE BOX
Chief Complaint:  Patient is a 60y old  Female who presents with a chief complaint of Transfer from [facility] (10 Mar 2021 12:08)      Interval Events:   no abd pain    Allergies:  Kiwi (Unknown)  latex (Anaphylaxis)  latex (Unknown)  penicillin (Other)  penicillin (Unknown)  perfume  hives (Other)  potassium acetate (Other)  soap additives hives (Other)      Hospital Medications:  acetaminophen    Suspension .. 650 milliGRAM(s) Oral every 6 hours PRN  ALBUTerol    90 MICROgram(s) HFA Inhaler 2 Puff(s) Inhalation every 6 hours  amLODIPine   Tablet 5 milliGRAM(s) Oral daily  artificial tears (preservative free) Ophthalmic Solution 1 Drop(s) Both EYES every 8 hours PRN  chlorhexidine 4% Liquid 1 Application(s) Topical daily  cholestyramine Powder (Sugar-Free) 4 Gram(s) Oral two times a day  clonazePAM  Tablet 0.5 milliGRAM(s) Oral daily  collagenase Ointment 1 Application(s) Topical two times a day  dextrose 40% Gel 15 Gram(s) Oral once  dextrose 5%. 1000 milliLiter(s) IV Continuous <Continuous>  dextrose 5%. 1000 milliLiter(s) IV Continuous <Continuous>  dextrose 5%. 1000 milliLiter(s) IV Continuous <Continuous>  dextrose 50% Injectable 25 Gram(s) IV Push once  dextrose 50% Injectable 12.5 Gram(s) IV Push once  dextrose 50% Injectable 25 Gram(s) IV Push once  diphenoxylate/atropine 1 Tablet(s) Oral four times a day PRN  glucagon  Injectable 1 milliGRAM(s) IntraMuscular once  guaiFENesin   Syrup  (Sugar-Free) 100 milliGRAM(s) Oral every 6 hours PRN  insulin lispro (ADMELOG) corrective regimen sliding scale   SubCutaneous Before meals and at bedtime  lactobacillus acidophilus 1 Tablet(s) Oral two times a day  metoprolol tartrate 50 milliGRAM(s) Oral two times a day  multivitamin 1 Tablet(s) Oral daily  nystatin Powder 1 Application(s) Topical every 8 hours  pantoprazole  Injectable 40 milliGRAM(s) IV Push daily  rivaroxaban 20 milliGRAM(s) Oral with dinner  zinc sulfate 220 milliGRAM(s) Oral daily      PMHX/PSHX:  Pneumomediastinum    Umbilical hernia    Osteoarthritis of both knees, unspecified osteoarthritis type    AARON (Obstructive Sleep Apnea)    Pneumonia    S/P Colon Resection    pt with allergies to perfumes and addidtive to soaps, requires hypoallergenic sheets and gowns, line    Cervical Dysplasia    Latex Sensitivity    GERD (Gastroesophageal Reflux Disease)    Asthma    Anemia    S/P Joint Replacement    DVT (Deep Venous Thrombosis)    HTN (Hypertension)    H/O ileostomy    S/P LEEP    S/P Exploratory Laparotomy    S/P Colonoscopy    S/P Endoscopy    S/P  Section    History of Tubal Ligation    S/P Knee Surgery    Umbilical Hernia    Osteoarthritis of Knee    HTN (Hypertension)        Family history:  Family history of gastric cancer    Family history of breast cancer (Grandparent)        ROS:     General:  No wt loss, fevers, chills, night sweats, fatigue,   Eyes:  Good vision, no reported pain  ENT:  No sore throat, pain, runny nose, dysphagia  CV:  No pain, palpitations, hypo/hypertension  Resp:  No dyspnea, cough, tachypnea, wheezing  GI:  See HPI  :  No pain, bleeding, incontinence, nocturia  Muscle:  No pain, weakness  Neuro:  No weakness, tingling, memory problems  Psych:  No fatigue, insomnia, mood problems, depression  Endocrine:  No polyuria, polydipsia, cold/heat intolerance  Heme:  No petechiae, ecchymosis, easy bruisability  Skin:  No rash, edema      PHYSICAL EXAM:     GENERAL:  Appears stated age, well-groomed, well-nourished, no distress  HEENT:  NC/AT,  conjunctivae clear, sclera-anicteric  NECK: Trachea midline, supple  CHEST:  Full & symmetric excursion, no increased effort, breath sounds clear  HEART:  Regular rhythm, no lola/heave  ABDOMEN:  Soft, non-tender, non-distended, normoactive bowel sounds,  no masses ,no hepato-splenomegaly,   EXTREMITIES:  no cyanosis,clubbing or edema  SKIN:  No rash/erythema/petechiae, no jaundice  NEURO:  Alert, oriented, no asterixis, weak  RECTAL: Deferred    Vital Signs:  Vital Signs Last 24 Hrs  T(C): 36.7 (10 Mar 2021 10:22), Max: 36.9 (09 Mar 2021 22:13)  T(F): 98 (10 Mar 2021 10:22), Max: 98.4 (09 Mar 2021 22:13)  HR: 84 (10 Mar 2021 10:22) (84 - 100)  BP: 141/68 (10 Mar 2021 10:22) (136/65 - 153/79)  BP(mean): --  RR: 17 (10 Mar 2021 10:22) (17 - 18)  SpO2: 98% (10 Mar 2021 10:22) (98% - 100%)  Daily     Daily     LABS:                        12.1   10.31 )-----------( 334      ( 09 Mar 2021 06:38 )             44.2         139  |  103  |  8   ----------------------------<  124<H>  4.1   |  23  |  0.41<L>    Ca    10.3      09 Mar 2021 06:38  Phos  4.5     03-  Mg     1.6         TPro  7.5  /  Alb  3.3  /  TBili  0.3  /  DBili  x   /  AST  54<H>  /  ALT  74<H>  /  AlkPhos  203<H>  0309    LIVER FUNCTIONS - ( 09 Mar 2021 06:38 )  Alb: 3.3 g/dL / Pro: 7.5 g/dL / ALK PHOS: 203 U/L / ALT: 74 U/L / AST: 54 U/L / GGT: x                   Imaging:

## 2021-03-10 NOTE — PROGRESS NOTE ADULT - ASSESSMENT
60 year old female with prolonged hospital course secondary to COVID 19, GI consulted for persistent diarrhea     Problem/Recommendation - 1:  Problem: COVID-19. Recommendation: status post treatment by infectious disease  now off of isolation.  pending dispo to acute rehab.     Problem/Recommendation - 2:  ·  Problem: Respiratory failure.  Recommendation: status post tracheostomy, now decannulated, on room air.     Problem/Recommendation - 3:  ·  Problem: Diarrhea.  Recommendation: now improved, was most likely due to tube feeds. Now tolerating regular food.  -will scale back lomotil to PRN as it is unlikely to be necessary at this time, will continue questran but can titrate as needed.     Problem/Recommendation - 4:  ·  Problem: Hernia, incisional.  Recommendation: reducible, nontender. No signs of incarceration.   -serial abdominal exams.      Problem/Recommendation - 5:  ·  Problem: Gastrostomy present.  Recommendation: patient tolerating good PO intake. Passed swallow eval. Gastrostomy removed at bedside and bandaged.     Problem/Recommendation - 6:  Problem: ARTEMIO (acute kidney injury). Recommendation: s/p CVVHD, now improved.     Problem/Recommendation - 7:  Problem: History of pressure ulcer. Recommendation: per RCU team.     Problem/Recommendation - 8:  Problem: Clostridium difficile infection. Recommendation: status post 2 weeks of PO vanco  -vanco per ID on hold.     Problem/Recommendation - 9:  Problem: Abnormal LFTs, likely artifact given hemolyzed specimen  -can repeat tomorrow    Attending Attestation:   Differential diagnosis and plan of care discussed with patient after the evaluation  75 Minutes spent on total encounter of which more than fifty percent of the encounter was spent counseling and/or coordinating care by the attending physician.      Ranjeet Ponce M.D.   Gastroenterology and Hepatology  Cell: [phone].

## 2021-03-10 NOTE — PROGRESS NOTE ADULT - ATTENDING COMMENTS
Differential diagnosis and plan of care discussed with patient after the evaluation.   Advanced care planning was discussed with patient.  Advanced care planning forms were reviewed and discussed.  OMT on six regions for acute somatic dysfunctions done at the bedside   Pain assessed and judicious use of narcotics when appropriate was discussed.  Importance of Fall prevention discussed.  Counseling on Smoking and Alcohol cessation was offered when appropriate.  Counseling on Diet, exercise, and medication compliance was done.

## 2021-03-11 PROBLEM — E66.2 OBESITY HYPOVENTILATION SYNDROME: Status: ACTIVE | Noted: 2017-12-14

## 2021-03-11 LAB
ALBUMIN SERPL ELPH-MCNC: 3.6 G/DL — SIGNIFICANT CHANGE UP (ref 3.3–5)
ALP SERPL-CCNC: 219 U/L — HIGH (ref 40–120)
ALT FLD-CCNC: 84 U/L — HIGH (ref 4–33)
ANION GAP SERPL CALC-SCNC: 14 MMOL/L — SIGNIFICANT CHANGE UP (ref 7–14)
AST SERPL-CCNC: 64 U/L — HIGH (ref 4–32)
BASOPHILS # BLD AUTO: 0.07 K/UL — SIGNIFICANT CHANGE UP (ref 0–0.2)
BASOPHILS NFR BLD AUTO: 0.8 % — SIGNIFICANT CHANGE UP (ref 0–2)
BILIRUB SERPL-MCNC: 0.4 MG/DL — SIGNIFICANT CHANGE UP (ref 0.2–1.2)
BUN SERPL-MCNC: 10 MG/DL — SIGNIFICANT CHANGE UP (ref 7–23)
CALCIUM SERPL-MCNC: 10.2 MG/DL — SIGNIFICANT CHANGE UP (ref 8.4–10.5)
CHLORIDE SERPL-SCNC: 103 MMOL/L — SIGNIFICANT CHANGE UP (ref 98–107)
CO2 SERPL-SCNC: 22 MMOL/L — SIGNIFICANT CHANGE UP (ref 22–31)
CREAT SERPL-MCNC: 0.36 MG/DL — LOW (ref 0.5–1.3)
EOSINOPHIL # BLD AUTO: 0.51 K/UL — HIGH (ref 0–0.5)
EOSINOPHIL NFR BLD AUTO: 6 % — SIGNIFICANT CHANGE UP (ref 0–6)
GLUCOSE SERPL-MCNC: 125 MG/DL — HIGH (ref 70–99)
HCT VFR BLD CALC: 41.8 % — SIGNIFICANT CHANGE UP (ref 34.5–45)
HGB BLD-MCNC: 11.9 G/DL — SIGNIFICANT CHANGE UP (ref 11.5–15.5)
IANC: 5.44 K/UL — SIGNIFICANT CHANGE UP (ref 1.5–8.5)
IMM GRANULOCYTES NFR BLD AUTO: 0.5 % — SIGNIFICANT CHANGE UP (ref 0–1.5)
LYMPHOCYTES # BLD AUTO: 1.98 K/UL — SIGNIFICANT CHANGE UP (ref 1–3.3)
LYMPHOCYTES # BLD AUTO: 23.2 % — SIGNIFICANT CHANGE UP (ref 13–44)
MAGNESIUM SERPL-MCNC: 1.8 MG/DL — SIGNIFICANT CHANGE UP (ref 1.6–2.6)
MCHC RBC-ENTMCNC: 26.2 PG — LOW (ref 27–34)
MCHC RBC-ENTMCNC: 28.5 GM/DL — LOW (ref 32–36)
MCV RBC AUTO: 92.1 FL — SIGNIFICANT CHANGE UP (ref 80–100)
MONOCYTES # BLD AUTO: 0.48 K/UL — SIGNIFICANT CHANGE UP (ref 0–0.9)
MONOCYTES NFR BLD AUTO: 5.6 % — SIGNIFICANT CHANGE UP (ref 2–14)
NEUTROPHILS # BLD AUTO: 5.44 K/UL — SIGNIFICANT CHANGE UP (ref 1.8–7.4)
NEUTROPHILS NFR BLD AUTO: 63.9 % — SIGNIFICANT CHANGE UP (ref 43–77)
NRBC # BLD: 0 /100 WBCS — SIGNIFICANT CHANGE UP
NRBC # FLD: 0 K/UL — SIGNIFICANT CHANGE UP
PHOSPHATE SERPL-MCNC: 4.1 MG/DL — SIGNIFICANT CHANGE UP (ref 2.5–4.5)
PLATELET # BLD AUTO: 426 K/UL — HIGH (ref 150–400)
POTASSIUM SERPL-MCNC: 4 MMOL/L — SIGNIFICANT CHANGE UP (ref 3.5–5.3)
POTASSIUM SERPL-SCNC: 4 MMOL/L — SIGNIFICANT CHANGE UP (ref 3.5–5.3)
PROT SERPL-MCNC: 7.5 G/DL — SIGNIFICANT CHANGE UP (ref 6–8.3)
RBC # BLD: 4.54 M/UL — SIGNIFICANT CHANGE UP (ref 3.8–5.2)
RBC # FLD: 15.9 % — HIGH (ref 10.3–14.5)
SARS-COV-2 IGG SERPL QL IA: POSITIVE
SARS-COV-2 IGM SERPL IA-ACNC: 144 INDEX — HIGH
SARS-COV-2 RNA SPEC QL NAA+PROBE: SIGNIFICANT CHANGE UP
SODIUM SERPL-SCNC: 139 MMOL/L — SIGNIFICANT CHANGE UP (ref 135–145)
WBC # BLD: 8.52 K/UL — SIGNIFICANT CHANGE UP (ref 3.8–10.5)
WBC # FLD AUTO: 8.52 K/UL — SIGNIFICANT CHANGE UP (ref 3.8–10.5)

## 2021-03-11 RX ORDER — CLONAZEPAM 1 MG
0.5 TABLET ORAL DAILY
Refills: 0 | Status: DISCONTINUED | OUTPATIENT
Start: 2021-03-11 | End: 2021-03-18

## 2021-03-11 RX ORDER — DIPHENOXYLATE HCL/ATROPINE 2.5-.025MG
1 TABLET ORAL
Refills: 0 | Status: DISCONTINUED | OUTPATIENT
Start: 2021-03-11 | End: 2021-03-11

## 2021-03-11 RX ADMIN — AMLODIPINE BESYLATE 5 MILLIGRAM(S): 2.5 TABLET ORAL at 05:59

## 2021-03-11 RX ADMIN — ALBUTEROL 2 PUFF(S): 90 AEROSOL, METERED ORAL at 22:12

## 2021-03-11 RX ADMIN — CHOLESTYRAMINE 4 GRAM(S): 4 POWDER, FOR SUSPENSION ORAL at 13:29

## 2021-03-11 RX ADMIN — Medication 1 TABLET(S): at 05:59

## 2021-03-11 RX ADMIN — CHLORHEXIDINE GLUCONATE 1 APPLICATION(S): 213 SOLUTION TOPICAL at 13:29

## 2021-03-11 RX ADMIN — PANTOPRAZOLE SODIUM 40 MILLIGRAM(S): 20 TABLET, DELAYED RELEASE ORAL at 13:28

## 2021-03-11 RX ADMIN — Medication 1 APPLICATION(S): at 05:59

## 2021-03-11 RX ADMIN — Medication 50 MILLIGRAM(S): at 05:59

## 2021-03-11 RX ADMIN — RIVAROXABAN 20 MILLIGRAM(S): KIT at 18:53

## 2021-03-11 RX ADMIN — ZINC SULFATE TAB 220 MG (50 MG ZINC EQUIVALENT) 220 MILLIGRAM(S): 220 (50 ZN) TAB at 13:28

## 2021-03-11 RX ADMIN — ALBUTEROL 2 PUFF(S): 90 AEROSOL, METERED ORAL at 06:04

## 2021-03-11 RX ADMIN — CHOLESTYRAMINE 4 GRAM(S): 4 POWDER, FOR SUSPENSION ORAL at 22:12

## 2021-03-11 RX ADMIN — Medication 1 TABLET(S): at 13:29

## 2021-03-11 RX ADMIN — Medication 1 APPLICATION(S): at 18:52

## 2021-03-11 RX ADMIN — ALBUTEROL 2 PUFF(S): 90 AEROSOL, METERED ORAL at 13:29

## 2021-03-11 RX ADMIN — Medication 0.5 MILLIGRAM(S): at 13:28

## 2021-03-11 RX ADMIN — Medication 1 TABLET(S): at 18:56

## 2021-03-11 RX ADMIN — NYSTATIN CREAM 1 APPLICATION(S): 100000 CREAM TOPICAL at 13:29

## 2021-03-11 RX ADMIN — NYSTATIN CREAM 1 APPLICATION(S): 100000 CREAM TOPICAL at 22:12

## 2021-03-11 RX ADMIN — ALBUTEROL 2 PUFF(S): 90 AEROSOL, METERED ORAL at 18:52

## 2021-03-11 RX ADMIN — NYSTATIN CREAM 1 APPLICATION(S): 100000 CREAM TOPICAL at 06:05

## 2021-03-11 RX ADMIN — Medication 50 MILLIGRAM(S): at 18:54

## 2021-03-11 NOTE — PROGRESS NOTE ADULT - SUBJECTIVE AND OBJECTIVE BOX
DATE OF SERVICE: 03-11-21     Subjective: Patient seen and examined. No new events except as noted.   doing okay       REVIEW OF SYSTEMS:    CONSTITUTIONAL: No weakness, fevers or chills  EYES/ENT: No visual changes;  No vertigo or throat pain   NECK: No pain or stiffness  RESPIRATORY: No cough, wheezing, hemoptysis; No shortness of breath  CARDIOVASCULAR: No chest pain or palpitations  GASTROINTESTINAL: No abdominal or epigastric pain.   GENITOURINARY: No dysuria, frequency or hematuria  NEUROLOGICAL: No numbness or weakness  SKIN: No itching, burning, rashes, or lesions   All other review of systems is negative unless indicated above.    MEDICATIONS:  MEDICATIONS  (STANDING):  ALBUTerol    90 MICROgram(s) HFA Inhaler 2 Puff(s) Inhalation every 6 hours  amLODIPine   Tablet 5 milliGRAM(s) Oral daily  chlorhexidine 4% Liquid 1 Application(s) Topical daily  cholestyramine Powder (Sugar-Free) 4 Gram(s) Oral two times a day  clonazePAM  Tablet 0.5 milliGRAM(s) Oral daily  collagenase Ointment 1 Application(s) Topical two times a day  dextrose 40% Gel 15 Gram(s) Oral once  dextrose 5%. 1000 milliLiter(s) (50 mL/Hr) IV Continuous <Continuous>  dextrose 5%. 1000 milliLiter(s) (100 mL/Hr) IV Continuous <Continuous>  dextrose 5%. 1000 milliLiter(s) (75 mL/Hr) IV Continuous <Continuous>  dextrose 50% Injectable 25 Gram(s) IV Push once  dextrose 50% Injectable 12.5 Gram(s) IV Push once  dextrose 50% Injectable 25 Gram(s) IV Push once  glucagon  Injectable 1 milliGRAM(s) IntraMuscular once  insulin lispro (ADMELOG) corrective regimen sliding scale   SubCutaneous Before meals and at bedtime  lactobacillus acidophilus 1 Tablet(s) Oral two times a day  metoprolol tartrate 50 milliGRAM(s) Oral two times a day  multivitamin 1 Tablet(s) Oral daily  nystatin Powder 1 Application(s) Topical every 8 hours  pantoprazole  Injectable 40 milliGRAM(s) IV Push daily  rivaroxaban 20 milliGRAM(s) Oral with dinner  zinc sulfate 220 milliGRAM(s) Oral daily      PHYSICAL EXAM:  T(C): 36.6 (03-11-21 @ 13:07), Max: 36.9 (03-10-21 @ 21:10)  HR: 98 (03-11-21 @ 18:45) (81 - 98)  BP: 144/63 (03-11-21 @ 18:45) (140/66 - 156/62)  RR: 16 (03-11-21 @ 13:07) (16 - 18)  SpO2: 98% (03-11-21 @ 13:07) (98% - 98%)  Wt(kg): --  I&O's Summary    10 Mar 2021 07:01  -  11 Mar 2021 07:00  --------------------------------------------------------  IN: 0 mL / OUT: 300 mL / NET: -300 mL          Appearance: Normal	  HEENT:  PERRLA   Lymphatic: No lymphadenopathy   Cardiovascular: Normal S1 S2, no JVD  Respiratory: normal effort , clear  Gastrointestinal:  Soft, Non-tender  Skin: No rashes,  warm to touch  Psychiatry:  Mood & affect appropriate  Musculuskeletal: No edema      All labs, Imaging and EKGs personally reviewed                           11.9   8.52  )-----------( 426      ( 11 Mar 2021 07:17 )             41.8               03-11    139  |  103  |  10  ----------------------------<  125<H>  4.0   |  22  |  0.36<L>    Ca    10.2      11 Mar 2021 07:17  Phos  4.1     03-11  Mg     1.8     03-11    TPro  7.5  /  Alb  3.6  /  TBili  0.4  /  DBili  x   /  AST  64<H>  /  ALT  84<H>  /  AlkPhos  219<H>  03-11

## 2021-03-11 NOTE — PROGRESS NOTE ADULT - SUBJECTIVE AND OBJECTIVE BOX
Chief Complaint:  Patient is a 60y old  Female who presents with a chief complaint of Transfer from [facility] (11 Mar 2021 20:18)      Interval Events:   no abdominal pain  regular BMs    Allergies:  Kiwi (Unknown)  latex (Anaphylaxis)  latex (Unknown)  penicillin (Other)  penicillin (Unknown)  perfume  hives (Other)  potassium acetate (Other)  soap additives hives (Other)      Hospital Medications:  acetaminophen    Suspension .. 650 milliGRAM(s) Oral every 6 hours PRN  ALBUTerol    90 MICROgram(s) HFA Inhaler 2 Puff(s) Inhalation every 6 hours  amLODIPine   Tablet 5 milliGRAM(s) Oral daily  artificial tears (preservative free) Ophthalmic Solution 1 Drop(s) Both EYES every 8 hours PRN  chlorhexidine 4% Liquid 1 Application(s) Topical daily  cholestyramine Powder (Sugar-Free) 4 Gram(s) Oral two times a day  clonazePAM  Tablet 0.5 milliGRAM(s) Oral daily  collagenase Ointment 1 Application(s) Topical two times a day  dextrose 40% Gel 15 Gram(s) Oral once  dextrose 5%. 1000 milliLiter(s) IV Continuous <Continuous>  dextrose 5%. 1000 milliLiter(s) IV Continuous <Continuous>  dextrose 5%. 1000 milliLiter(s) IV Continuous <Continuous>  dextrose 50% Injectable 25 Gram(s) IV Push once  dextrose 50% Injectable 12.5 Gram(s) IV Push once  dextrose 50% Injectable 25 Gram(s) IV Push once  diphenoxylate/atropine 1 Tablet(s) Oral four times a day PRN  glucagon  Injectable 1 milliGRAM(s) IntraMuscular once  guaiFENesin   Syrup  (Sugar-Free) 100 milliGRAM(s) Oral every 6 hours PRN  insulin lispro (ADMELOG) corrective regimen sliding scale   SubCutaneous Before meals and at bedtime  lactobacillus acidophilus 1 Tablet(s) Oral two times a day  metoprolol tartrate 50 milliGRAM(s) Oral two times a day  multivitamin 1 Tablet(s) Oral daily  nystatin Powder 1 Application(s) Topical every 8 hours  pantoprazole  Injectable 40 milliGRAM(s) IV Push daily  rivaroxaban 20 milliGRAM(s) Oral with dinner  zinc sulfate 220 milliGRAM(s) Oral daily      PMHX/PSHX:  Pneumomediastinum    Umbilical hernia    Osteoarthritis of both knees, unspecified osteoarthritis type    AARON (Obstructive Sleep Apnea)    Pneumonia    S/P Colon Resection    pt with allergies to perfumes and addidtive to soaps, requires hypoallergenic sheets and gowns, line    Cervical Dysplasia    Latex Sensitivity    GERD (Gastroesophageal Reflux Disease)    Asthma    Anemia    S/P Joint Replacement    DVT (Deep Venous Thrombosis)    HTN (Hypertension)    H/O ileostomy    S/P LEEP    S/P Exploratory Laparotomy    S/P Colonoscopy    S/P Endoscopy    S/P  Section    History of Tubal Ligation    S/P Knee Surgery    Umbilical Hernia    Osteoarthritis of Knee    HTN (Hypertension)        Family history:  Family history of gastric cancer    Family history of breast cancer (Grandparent)        ROS:     General:  No wt loss, fevers, chills, night sweats, fatigue,   Eyes:  Good vision, no reported pain  ENT:  No sore throat, pain, runny nose, dysphagia  CV:  No pain, palpitations, hypo/hypertension  Resp:  No dyspnea, cough, tachypnea, wheezing  GI:  See HPI  :  No pain, bleeding, incontinence, nocturia  Muscle:  No pain, weakness  Neuro:  No weakness, tingling, memory problems  Psych:  No fatigue, insomnia, mood problems, depression  Endocrine:  No polyuria, polydipsia, cold/heat intolerance  Heme:  No petechiae, ecchymosis, easy bruisability  Skin:  No rash, edema      PHYSICAL EXAM:     GENERAL:  Appears stated age, well-groomed, well-nourished, no distress  HEENT:  NC/AT,  conjunctivae clear, sclera-anicteric  NECK: Trachea midline, supple  CHEST:  Full & symmetric excursion, no increased effort, breath sounds clear  HEART:  Regular rhythm, no lola/heave  ABDOMEN:  Soft, non-tender, non-distended, normoactive bowel sounds,  no masses ,no hepato-splenomegaly, gastrostomy site closed  EXTREMITIES:  no cyanosis,clubbing or edema, LUE contracted  SKIN:  No rash/erythema/petechiae, no jaundice  NEURO:  Alert, oriented, no asterixis  RECTAL: Deferred    Vital Signs:  Vital Signs Last 24 Hrs  T(C): 36.6 (11 Mar 2021 13:07), Max: 36.9 (10 Mar 2021 21:10)  T(F): 97.9 (11 Mar 2021 13:07), Max: 98.4 (10 Mar 2021 21:10)  HR: 98 (11 Mar 2021 18:45) (81 - 98)  BP: 144/63 (11 Mar 2021 18:45) (140/66 - 156/62)  BP(mean): --  RR: 16 (11 Mar 2021 13:07) (16 - 18)  SpO2: 98% (11 Mar 2021 13:07) (98% - 98%)  Daily     Daily     LABS:                        11.9   8.52  )-----------( 426      ( 11 Mar 2021 07:17 )             41.8     11    139  |  103  |  10  ----------------------------<  125<H>  4.0   |  22  |  0.36<L>    Ca    10.2      11 Mar 2021 07:17  Phos  4.1     -  Mg     1.8         TPro  7.5  /  Alb  3.6  /  TBili  0.4  /  DBili  x   /  AST  64<H>  /  ALT  84<H>  /  AlkPhos  219<H>  11    LIVER FUNCTIONS - ( 11 Mar 2021 07:17 )  Alb: 3.6 g/dL / Pro: 7.5 g/dL / ALK PHOS: 219 U/L / ALT: 84 U/L / AST: 64 U/L / GGT: x                   Imaging:

## 2021-03-11 NOTE — PROGRESS NOTE ADULT - ASSESSMENT
61 YO F with PMHx of HTN, DVT on Xarelto, Peritonitis s/p Oral's Procedure and Ileostomy (reversed in 2011) and AARON who presented to [facility] on 11/18 for AHRF second to COVID 19, intubated on 11/23 complicated by lung abscesses on CT CHEST 12/5, multi-bacterial PNA and bacteremia, ARTEMIO s/p CRRT and HD, and s/p Tracheostomy 12/18. Transferred to RCU for further management, now transferred to . Pt now decannulated and PEG removed, eating dysphagia diet, pending dc to acute rehab.         # CDIFF (+)   - s/p PO Vancomycin 2/1-2/25  - Stool O&P and GI PCR NGTD, c/w Lomotil QID   - GI eval appreciated   -stool more formed as of 3/4, will need at least 48 hrs of formed stool prior to DC. Earliest pt would be able to dc'd is Saturday 3/6  - pending D/C to acute rehab, on hold due to repeat covid positive   - Isolation and quarantine per protocol , D/c Planing to rehab       # ARDS second to COVID vs Multi-bacterial PNA/ R Lung Abscess  - s/p Intubated on 11/23 and c/b multi-bacterial PNA and lung abscesses.   - s/p Tracheostomy on 12/18 by CTSx. Sutures out 1/1  - s/p Air leak noted and s/p Bivona with CTSx 12/31. s/p Downsized to 6DCT 2/25  - s/p Successfully decannulated on 3/1, Now on 2LNC saturating well  - Proventil and Chest PT q6hr PRN      # ARTEMIO s/p CRRT and HD   - ARTEMIO now resolved and no longer required HD.   - Hypokalemia in setting of diarrhea/ CDIFF. Monitor electrolytes.   - Monitor renal function and avoid nephrotoxins.       # Sepsis second to COVID vs Multi-bacterial PNA/ Bacteremia and R Lung Abscess vs CDIFF (+)   - COVID with superimposed multi-bacterial VAP vs aspiration PNA vs cavitary PNA.   - SCx (11/24) MSSA and BCx (11/27) with MSSA and Proteus Bacteremia  - SCx (12/1, 12/12 and 12/27) with Stenotrophomonas.   - SCx (1/9) with  Pseudomonas and Stenotrophomonas   - Bcx has been persistently negative from 12/1, and most recently 1/12   - CT CHEST 12/4 with right lung cavitation.   - s/p multiple antibiotics, but now completed Fortaz and Flagyl (1/12-1/16)  - RPT CT CHEST 1/28 with improvement of right lung abscess   - SCx with  Pseudomonas and Stenotrophomonas and s/p TED/ Fortaz (1/28-2/6)      # DMII   - Continue on ISS   - FS well controlled      # Hx of DVT   - c/w Xarelto      # Contracted Wrists   - PT/OT working on strength   OMT at the bedside       # CODE STATUS - FULL CODE     # DISPO - PM&R recs ACUTE REHAB

## 2021-03-11 NOTE — PROGRESS NOTE ADULT - ASSESSMENT
60 year old female with prolonged hospital course secondary to COVID 19, GI consulted for persistent diarrhea     Problem/Recommendation - 1:  Problem: COVID-19. Recommendation: status post treatment by infectious disease  now off of isolation.  pending dispo to acute rehab pending antibody levels     Problem/Recommendation - 2:  ·  Problem: Respiratory failure.  Recommendation: status post tracheostomy, now decannulated, on room air.     Problem/Recommendation - 3:  ·  Problem: Diarrhea.  Recommendation: now improved, was most likely due to tube feeds. Now tolerating regular food.  -lomotil PRN     Problem/Recommendation - 4:  ·  Problem: Hernia, incisional.  Recommendation: reducible, nontender. No signs of incarceration.   -serial abdominal exams.      Problem/Recommendation - 5:  ·  Problem: Gastrostomy present.  Recommendation: patient tolerating good PO intake. Passed swallow eval. Gastrostomy removed at bedside and bandaged.     Problem/Recommendation - 6:  Problem: ARTEMIO (acute kidney injury). Recommendation: s/p CVVHD, now improved.     Problem/Recommendation - 7:  Problem: History of pressure ulcer. Recommendation: per RCU team.     Problem/Recommendation - 8:  Problem: Clostridium difficile infection. Recommendation: status post 2 weeks of PO vanco  -vanco per ID on hold.     Problem/Recommendation - 9:  Problem: Abnormal LFTs, ALK phos predominant, most likely delayed presentation of mild "COVID cholangiopathy". a  -periodic LFT measurements.    Attending Attestation:   Differential diagnosis and plan of care discussed with patient after the evaluation  75 Minutes spent on total encounter of which more than fifty percent of the encounter was spent counseling and/or coordinating care by the attending physician.      Ranjeet Ponce M.D.   Gastroenterology and Hepatology  Cell: [phone].

## 2021-03-12 PROCEDURE — 99232 SBSQ HOSP IP/OBS MODERATE 35: CPT

## 2021-03-12 RX ADMIN — Medication 1 TABLET(S): at 18:35

## 2021-03-12 RX ADMIN — NYSTATIN CREAM 1 APPLICATION(S): 100000 CREAM TOPICAL at 06:22

## 2021-03-12 RX ADMIN — NYSTATIN CREAM 1 APPLICATION(S): 100000 CREAM TOPICAL at 13:40

## 2021-03-12 RX ADMIN — ALBUTEROL 2 PUFF(S): 90 AEROSOL, METERED ORAL at 09:32

## 2021-03-12 RX ADMIN — ZINC SULFATE TAB 220 MG (50 MG ZINC EQUIVALENT) 220 MILLIGRAM(S): 220 (50 ZN) TAB at 13:40

## 2021-03-12 RX ADMIN — PANTOPRAZOLE SODIUM 40 MILLIGRAM(S): 20 TABLET, DELAYED RELEASE ORAL at 13:39

## 2021-03-12 RX ADMIN — Medication 1 APPLICATION(S): at 06:22

## 2021-03-12 RX ADMIN — ALBUTEROL 2 PUFF(S): 90 AEROSOL, METERED ORAL at 22:22

## 2021-03-12 RX ADMIN — Medication 1 TABLET(S): at 13:49

## 2021-03-12 RX ADMIN — Medication 50 MILLIGRAM(S): at 06:22

## 2021-03-12 RX ADMIN — RIVAROXABAN 20 MILLIGRAM(S): KIT at 18:34

## 2021-03-12 RX ADMIN — CHOLESTYRAMINE 4 GRAM(S): 4 POWDER, FOR SUSPENSION ORAL at 22:22

## 2021-03-12 RX ADMIN — Medication 1 APPLICATION(S): at 18:34

## 2021-03-12 RX ADMIN — AMLODIPINE BESYLATE 5 MILLIGRAM(S): 2.5 TABLET ORAL at 06:22

## 2021-03-12 RX ADMIN — Medication 50 MILLIGRAM(S): at 18:34

## 2021-03-12 RX ADMIN — NYSTATIN CREAM 1 APPLICATION(S): 100000 CREAM TOPICAL at 22:22

## 2021-03-12 RX ADMIN — Medication 0.5 MILLIGRAM(S): at 13:38

## 2021-03-12 RX ADMIN — ALBUTEROL 2 PUFF(S): 90 AEROSOL, METERED ORAL at 18:34

## 2021-03-12 RX ADMIN — CHLORHEXIDINE GLUCONATE 1 APPLICATION(S): 213 SOLUTION TOPICAL at 13:40

## 2021-03-12 RX ADMIN — CHOLESTYRAMINE 4 GRAM(S): 4 POWDER, FOR SUSPENSION ORAL at 09:32

## 2021-03-12 RX ADMIN — Medication 1 TABLET(S): at 06:22

## 2021-03-12 NOTE — PROGRESS NOTE ADULT - SUBJECTIVE AND OBJECTIVE BOX
DATE OF SERVICE: 03-12-21     Subjective: Patient seen and examined. No new events except as noted.   doing okay     REVIEW OF SYSTEMS:    CONSTITUTIONAL: No weakness, fevers or chills  EYES/ENT: No visual changes;  No vertigo or throat pain   NECK: No pain or stiffness  RESPIRATORY: No cough, wheezing, hemoptysis; No shortness of breath  CARDIOVASCULAR: No chest pain or palpitations  GASTROINTESTINAL: No abdominal or epigastric pain.   GENITOURINARY: No dysuria, frequency or hematuria  NEUROLOGICAL: No numbness or weakness  SKIN: No itching, burning, rashes, or lesions   All other review of systems is negative unless indicated above.    MEDICATIONS:  MEDICATIONS  (STANDING):  ALBUTerol    90 MICROgram(s) HFA Inhaler 2 Puff(s) Inhalation every 6 hours  amLODIPine   Tablet 5 milliGRAM(s) Oral daily  chlorhexidine 4% Liquid 1 Application(s) Topical daily  cholestyramine Powder (Sugar-Free) 4 Gram(s) Oral two times a day  clonazePAM  Tablet 0.5 milliGRAM(s) Oral daily  collagenase Ointment 1 Application(s) Topical two times a day  dextrose 40% Gel 15 Gram(s) Oral once  dextrose 5%. 1000 milliLiter(s) (50 mL/Hr) IV Continuous <Continuous>  dextrose 5%. 1000 milliLiter(s) (100 mL/Hr) IV Continuous <Continuous>  dextrose 5%. 1000 milliLiter(s) (75 mL/Hr) IV Continuous <Continuous>  dextrose 50% Injectable 25 Gram(s) IV Push once  dextrose 50% Injectable 12.5 Gram(s) IV Push once  dextrose 50% Injectable 25 Gram(s) IV Push once  glucagon  Injectable 1 milliGRAM(s) IntraMuscular once  insulin lispro (ADMELOG) corrective regimen sliding scale   SubCutaneous Before meals and at bedtime  lactobacillus acidophilus 1 Tablet(s) Oral two times a day  metoprolol tartrate 50 milliGRAM(s) Oral two times a day  multivitamin 1 Tablet(s) Oral daily  nystatin Powder 1 Application(s) Topical every 8 hours  pantoprazole  Injectable 40 milliGRAM(s) IV Push daily  rivaroxaban 20 milliGRAM(s) Oral with dinner  zinc sulfate 220 milliGRAM(s) Oral daily      PHYSICAL EXAM:  T(C): 37 (03-12-21 @ 12:00), Max: 37 (03-12-21 @ 12:00)  HR: 90 (03-12-21 @ 12:00) (88 - 98)  BP: 141/68 (03-12-21 @ 12:00) (141/68 - 146/61)  RR: 17 (03-12-21 @ 12:00) (17 - 18)  SpO2: 95% (03-12-21 @ 12:00) (95% - 98%)  Wt(kg): --  I&O's Summary        Appearance: Normal	  HEENT:  PERRLA   Lymphatic: No lymphadenopathy   Cardiovascular: Normal S1 S2, no JVD  Respiratory: normal effort , clear  Gastrointestinal:  Soft, Non-tender  Skin: No rashes,  warm to touch  Psychiatry:  Mood & affect appropriate  Musculuskeletal: No edema      All labs, Imaging and EKGs personally reviewed                           11.9   8.52  )-----------( 426      ( 11 Mar 2021 07:17 )             41.8               03-11    139  |  103  |  10  ----------------------------<  125<H>  4.0   |  22  |  0.36<L>    Ca    10.2      11 Mar 2021 07:17  Phos  4.1     03-11  Mg     1.8     03-11    TPro  7.5  /  Alb  3.6  /  TBili  0.4  /  DBili  x   /  AST  64<H>  /  ALT  84<H>  /  AlkPhos  219<H>  03-11

## 2021-03-12 NOTE — PROGRESS NOTE ADULT - SUBJECTIVE AND OBJECTIVE BOX
Chief Complaint:  Patient is a 60y old  Female who presents with a chief complaint of Transfer from [facility] (12 Mar 2021 09:02)      Interval Events:   no abd pain  no diarrhea    Allergies:  Kiwi (Unknown)  latex (Anaphylaxis)  latex (Unknown)  penicillin (Other)  penicillin (Unknown)  perfume  hives (Other)  potassium acetate (Other)  soap additives hives (Other)      Hospital Medications:  acetaminophen    Suspension .. 650 milliGRAM(s) Oral every 6 hours PRN  ALBUTerol    90 MICROgram(s) HFA Inhaler 2 Puff(s) Inhalation every 6 hours  amLODIPine   Tablet 5 milliGRAM(s) Oral daily  artificial tears (preservative free) Ophthalmic Solution 1 Drop(s) Both EYES every 8 hours PRN  chlorhexidine 4% Liquid 1 Application(s) Topical daily  cholestyramine Powder (Sugar-Free) 4 Gram(s) Oral two times a day  clonazePAM  Tablet 0.5 milliGRAM(s) Oral daily  collagenase Ointment 1 Application(s) Topical two times a day  dextrose 40% Gel 15 Gram(s) Oral once  dextrose 5%. 1000 milliLiter(s) IV Continuous <Continuous>  dextrose 5%. 1000 milliLiter(s) IV Continuous <Continuous>  dextrose 5%. 1000 milliLiter(s) IV Continuous <Continuous>  dextrose 50% Injectable 25 Gram(s) IV Push once  dextrose 50% Injectable 12.5 Gram(s) IV Push once  dextrose 50% Injectable 25 Gram(s) IV Push once  diphenoxylate/atropine 1 Tablet(s) Oral four times a day PRN  glucagon  Injectable 1 milliGRAM(s) IntraMuscular once  guaiFENesin   Syrup  (Sugar-Free) 100 milliGRAM(s) Oral every 6 hours PRN  insulin lispro (ADMELOG) corrective regimen sliding scale   SubCutaneous Before meals and at bedtime  lactobacillus acidophilus 1 Tablet(s) Oral two times a day  metoprolol tartrate 50 milliGRAM(s) Oral two times a day  multivitamin 1 Tablet(s) Oral daily  nystatin Powder 1 Application(s) Topical every 8 hours  pantoprazole  Injectable 40 milliGRAM(s) IV Push daily  rivaroxaban 20 milliGRAM(s) Oral with dinner  zinc sulfate 220 milliGRAM(s) Oral daily      PMHX/PSHX:  Pneumomediastinum    Umbilical hernia    Osteoarthritis of both knees, unspecified osteoarthritis type    AARON (Obstructive Sleep Apnea)    Pneumonia    S/P Colon Resection    pt with allergies to perfumes and addidtive to soaps, requires hypoallergenic sheets and gowns, line    Cervical Dysplasia    Latex Sensitivity    GERD (Gastroesophageal Reflux Disease)    Asthma    Anemia    S/P Joint Replacement    DVT (Deep Venous Thrombosis)    HTN (Hypertension)    H/O ileostomy    S/P LEEP    S/P Exploratory Laparotomy    S/P Colonoscopy    S/P Endoscopy    S/P  Section    History of Tubal Ligation    S/P Knee Surgery    Umbilical Hernia    Osteoarthritis of Knee    HTN (Hypertension)        Family history:  Family history of gastric cancer    Family history of breast cancer (Grandparent)        ROS:     General:  No wt loss, fevers, chills, night sweats, fatigue,   Eyes:  Good vision, no reported pain  ENT:  No sore throat, pain, runny nose, dysphagia  CV:  No pain, palpitations, hypo/hypertension  Resp:  No dyspnea, cough, tachypnea, wheezing  GI:  See HPI  :  No pain, bleeding, incontinence, nocturia  Muscle:  No pain, weakness  Neuro:  No weakness, tingling, memory problems  Psych:  No fatigue, insomnia, mood problems, depression  Endocrine:  No polyuria, polydipsia, cold/heat intolerance  Heme:  No petechiae, ecchymosis, easy bruisability  Skin:  No rash, edema      PHYSICAL EXAM:     GENERAL:  Appears stated age, well-groomed, well-nourished, no distress  HEENT:  NC/AT,  conjunctivae clear, sclera-anicteric  NECK: Trachea midline, supple  CHEST:  Full & symmetric excursion, no increased effort, breath sounds clear  HEART:  Regular rhythm, no lola/heave  ABDOMEN:  Soft, non-tender, non-distended, normoactive bowel sounds,  no masses ,no hepato-splenomegaly, gastrostomy tract closed  EXTREMITIES:  no cyanosis,clubbing or edema, contractures  SKIN:  No rash/erythema/petechiae, no jaundice  NEURO:  Alert, oriented, no asterixis, bedbound  RECTAL: Deferred    Vital Signs:  Vital Signs Last 24 Hrs  T(C): 36.6 (12 Mar 2021 05:55), Max: 36.6 (11 Mar 2021 13:07)  T(F): 97.8 (12 Mar 2021 05:55), Max: 97.9 (11 Mar 2021 13:07)  HR: 88 (12 Mar 2021 05:55) (88 - 98)  BP: 146/61 (12 Mar 2021 05:55) (144/63 - 146/61)  BP(mean): --  RR: 18 (12 Mar 2021 05:55) (16 - 18)  SpO2: 98% (12 Mar 2021 05:55) (97% - 98%)  Daily     Daily Weight in k.8 (12 Mar 2021 05:55)    LABS:                        11.9   8.52  )-----------( 426      ( 11 Mar 2021 07:17 )             41.8     03-11    139  |  103  |  10  ----------------------------<  125<H>  4.0   |  22  |  0.36<L>    Ca    10.2      11 Mar 2021 07:17  Phos  4.1     03-11  Mg     1.8     -    TPro  7.5  /  Alb  3.6  /  TBili  0.4  /  DBili  x   /  AST  64<H>  /  ALT  84<H>  /  AlkPhos  219<H>  -11    LIVER FUNCTIONS - ( 11 Mar 2021 07:17 )  Alb: 3.6 g/dL / Pro: 7.5 g/dL / ALK PHOS: 219 U/L / ALT: 84 U/L / AST: 64 U/L / GGT: x                   Imaging:

## 2021-03-12 NOTE — PROGRESS NOTE ADULT - ASSESSMENT
60 year old female with prolonged hospital course secondary to COVID 19, GI consulted for persistent diarrhea     Problem/Recommendation - 1:  Problem: COVID-19. Recommendation: status post treatment by infectious disease  now off of isolation.  pending dispo to acute rehab pending antibody levels. Antibody levels are high. Will follow up with social work.     Problem/Recommendation - 2:  ·  Problem: Respiratory failure.  Recommendation: status post tracheostomy, now decannulated, on room air.     Problem/Recommendation - 3:  ·  Problem: Diarrhea.  Recommendation: now improved, was most likely due to tube feeds. Now tolerating regular food.  -lomotil PRN     Problem/Recommendation - 4:  ·  Problem: Hernia, incisional.  Recommendation: reducible, nontender. No signs of incarceration.   -serial abdominal exams.      Problem/Recommendation - 5:  ·  Problem: Gastrostomy present.  Recommendation: patient tolerating good PO intake. Passed swallow eval. Gastrostomy removed at bedside and bandaged.     Problem/Recommendation - 6:  Problem: ARTEMIO (acute kidney injury). Recommendation: s/p CVVHD, now improved.     Problem/Recommendation - 7:  Problem: History of pressure ulcer. Recommendation: per RCU team.     Problem/Recommendation - 8:  Problem: Clostridium difficile infection. Recommendation: status post 2 weeks of PO vanco  -vanco per ID on hold.     Problem/Recommendation - 9:  Problem: Abnormal LFTs, ALK phos predominant, most likely delayed presentation of mild "COVID cholangiopathy". a  -periodic LFT measurements.    Attending Attestation:   Differential diagnosis and plan of care discussed with patient after the evaluation  75 Minutes spent on total encounter of which more than fifty percent of the encounter was spent counseling and/or coordinating care by the attending physician.      Ranjeet Ponce M.D.   Gastroenterology and Hepatology  Cell: [phone].

## 2021-03-12 NOTE — PROGRESS NOTE ADULT - SUBJECTIVE AND OBJECTIVE BOX
Patient is a 60y old  Female who presents with a chief complaint of Transfer from [facility] (11 Mar 2021 21:08)      HPI:  Patient is a 59yo F w/ AARON, peritonitis s/p Oral's procedure and ileostomy (reversed ), HTN, prior DVT/PE, ?Asthma admitted on 2020 to [facility] after presenting for  productive cough  w/ SOB x9 days and diarrhea x7 days and fever x1 day. Patient was found to be COVID + on 2020 and completed remdesivir -. Patient was intubated for airway protection on 2020. Patient has required proning (last 2020). Hospital course c/b by ATN, requiring CVVHD now on intermittent HD, unresponsive to bumex challenge last HD . Course also c/b Stenotrophomonas (, completed Levaquin x7 days), MSSA/P. mirablis bacteremia (on Cefazolin, potentially due to urine vs line-associated and MSSA in sputum due to PNA), also with lung abscesses on CT chest on .  Patient currently on Volume AC 30/350/8/35% on Precedex. Patient intermittently opens eyes but is not yet responsive. Transferred over to [facility] on 12/10 to offload [facility] COVID ICU    (10 Dec 2020 14:13)    Denies new complaint.  Seen eating breakfast with assistance.     REVIEW OF SYSTEMS  Constitutional - No fever, No weight loss, No fatigue  HEENT - No eye pain, No visual disturbances, No difficulty hearing, No tinnitus, No vertigo, No neck pain  Respiratory - No cough, No wheezing, No shortness of breath  Cardiovascular - No chest pain, No palpitations  Gastrointestinal - No abdominal pain, No nausea, No vomiting, No diarrhea, No constipation  Genitourinary - No dysuria, No frequency, No hematuria, No incontinence  Neurological - No headaches, No memory loss, + loss of strength, No numbness, No tremors  Skin - No itching, No rashes, No lesions   Endocrine - No temperature intolerance  Musculoskeletal - No joint pain, No joint swelling, No muscle pain  Psychiatric - No depression, No anxiety    PAST MEDICAL & SURGICAL HISTORY  Pneumomediastinum    Umbilical hernia    Osteoarthritis of both knees, unspecified osteoarthritis type    AARON (Obstructive Sleep Apnea)    Pneumonia    S/P Colon Resection    pt with allergies to perfumes and addidtive to soaps, requires hypoallergenic sheets and gowns, line    Cervical Dysplasia    Latex Sensitivity    GERD (Gastroesophageal Reflux Disease)    Asthma    Anemia    S/P Joint Replacement    DVT (Deep Venous Thrombosis)    HTN (Hypertension)    H/O ileostomy    S/P LEEP    S/P Exploratory Laparotomy    S/P Colonoscopy    S/P Endoscopy    S/P  Section    History of Tubal Ligation    S/P Knee Surgery    Umbilical Hernia    Osteoarthritis of Knee    HTN (Hypertension)      CURRENT FUNCTIONAL STATUS  3/10 Pt required moderate assistx2 for bed mobility, pt required contact guard/minimal assist to maintain upright sitting balance.    FAMILY HISTORY   Family history of gastric cancer  Family history of breast cancer (Grandparent)        RECENT LABS/IMAGING  CBC Full  -  ( 11 Mar 2021 07:17 )  WBC Count : 8.52 K/uL  RBC Count : 4.54 M/uL  Hemoglobin : 11.9 g/dL  Hematocrit : 41.8 %  Platelet Count - Automated : 426 K/uL  Mean Cell Volume : 92.1 fL  Mean Cell Hemoglobin : 26.2 pg  Mean Cell Hemoglobin Concentration : 28.5 gm/dL  Auto Neutrophil # : 5.44 K/uL  Auto Lymphocyte # : 1.98 K/uL  Auto Monocyte # : 0.48 K/uL  Auto Eosinophil # : 0.51 K/uL  Auto Basophil # : 0.07 K/uL  Auto Neutrophil % : 63.9 %  Auto Lymphocyte % : 23.2 %  Auto Monocyte % : 5.6 %  Auto Eosinophil % : 6.0 %  Auto Basophil % : 0.8 %        139  |  103  |  10  ----------------------------<  125<H>  4.0   |  22  |  0.36<L>    Ca    10.2      11 Mar 2021 07:17  Phos  4.1       Mg     1.8         TPro  7.5  /  Alb  3.6  /  TBili  0.4  /  DBili  x   /  AST  64<H>  /  ALT  84<H>  /  AlkPhos  219<H>          VITALS  T(C): 36.6 (21 @ 05:55), Max: 36.6 (21 @ 13:07)  HR: 88 (21 @ 05:55) (88 - 98)  BP: 146/61 (21 @ 05:55) (144/63 - 146/61)  RR: 18 (21 @ 05:55) (16 - 18)  SpO2: 98% (21 @ 05:55) (97% - 98%)  Wt(kg): --    ALLERGIES  Kiwi (Unknown)  latex (Anaphylaxis)  latex (Unknown)  penicillin (Other)  penicillin (Unknown)  perfume  hives (Other)  potassium acetate (Other)  soap additives hives (Other)      MEDICATIONS   acetaminophen    Suspension .. 650 milliGRAM(s) Oral every 6 hours PRN  ALBUTerol    90 MICROgram(s) HFA Inhaler 2 Puff(s) Inhalation every 6 hours  amLODIPine   Tablet 5 milliGRAM(s) Oral daily  artificial tears (preservative free) Ophthalmic Solution 1 Drop(s) Both EYES every 8 hours PRN  chlorhexidine 4% Liquid 1 Application(s) Topical daily  cholestyramine Powder (Sugar-Free) 4 Gram(s) Oral two times a day  clonazePAM  Tablet 0.5 milliGRAM(s) Oral daily  collagenase Ointment 1 Application(s) Topical two times a day  dextrose 40% Gel 15 Gram(s) Oral once  dextrose 5%. 1000 milliLiter(s) IV Continuous <Continuous>  dextrose 5%. 1000 milliLiter(s) IV Continuous <Continuous>  dextrose 5%. 1000 milliLiter(s) IV Continuous <Continuous>  dextrose 50% Injectable 25 Gram(s) IV Push once  dextrose 50% Injectable 12.5 Gram(s) IV Push once  dextrose 50% Injectable 25 Gram(s) IV Push once  diphenoxylate/atropine 1 Tablet(s) Oral four times a day PRN  glucagon  Injectable 1 milliGRAM(s) IntraMuscular once  guaiFENesin   Syrup  (Sugar-Free) 100 milliGRAM(s) Oral every 6 hours PRN  insulin lispro (ADMELOG) corrective regimen sliding scale   SubCutaneous Before meals and at bedtime  lactobacillus acidophilus 1 Tablet(s) Oral two times a day  metoprolol tartrate 50 milliGRAM(s) Oral two times a day  multivitamin 1 Tablet(s) Oral daily  nystatin Powder 1 Application(s) Topical every 8 hours  pantoprazole  Injectable 40 milliGRAM(s) IV Push daily  rivaroxaban 20 milliGRAM(s) Oral with dinner  zinc sulfate 220 milliGRAM(s) Oral daily      ----------------------------------------------------------------------------------------  PHYSICAL EXAM  Constitutional - NAD, Comfortable   HEENT - NCAT, EOMI,  dressing over decannulated trach site,  + nc  Chest - no respiratory distress  Cardiovascular - RRR, S1S2   Abdomen -  Soft, NTND   Extremities - LUE brace in place  Neurologic Exam -                    Cognitive - Awake, Alert, AAO to self, place, date, year, situation     Communication - Fluent, No dysarthria     Cranial Nerves - CN 2-12 intact     Motor -                      LEFT    UE - ShAB 4/5, EF 3/5, EE 3/5, WE 0/5,  1/5                    RIGHT UE - ShAB 4/5, EF 2/5, EE 3/5, WE 1/5,  2/5                    LEFT    LE - HF 3/5, KE 4/5, DF 3/5, PF 4/5                    RIGHT LE - HF 4/5, KE 4/5, DF 4/5, PF 4/5        Sensory - Intact to LT         Balance - WNL Static  Psychiatric - Mood stable, Affect WNL  ----------------------------------------------------------------------------------------  ASSESSMENT/PLAN  Patient is a 59yo F w/ AARON, peritonitis s/p Oral's procedure and ileostomy (reversed ), HTN, prior DVT/PE, ?Asthma admitted on 2020 to [facility],  transferred to [facility] for AHRF second to COVID 19, intubated on  complicated by R lung abscesses, multibacterial PNA and bacteremia, ARTEMIO s/p CRRT and HD, and s/p Tracheostomy .  on Klonopin for anxiety per behavioral health  Pain - tylenol prn  htn: norvasc, lopressor  DVT PPX - rivaroxaban 20 milliGRAM(s) Oral with dinner  Diet:  dysphagia 2 mech soft thin liqids  continue bedside PT and OT  SLP  OOB as tolerates  turn and position q 2  Rehab -     Recommend acute inpatient rehab when medically cleared  patient can tolerate 3 hrs/day of therapy

## 2021-03-12 NOTE — PROGRESS NOTE ADULT - ASSESSMENT
59 YO F with PMHx of HTN, DVT on Xarelto, Peritonitis s/p Oral's Procedure and Ileostomy (reversed in 2011) and AARON who presented to [facility] on 11/18 for AHRF second to COVID 19, intubated on 11/23 complicated by lung abscesses on CT CHEST 12/5, multi-bacterial PNA and bacteremia, ARTEMIO s/p CRRT and HD, and s/p Tracheostomy 12/18. Transferred to RCU for further management, now transferred to . Pt now decannulated and PEG removed, eating dysphagia diet, pending dc to acute rehab.         # CDIFF (+)   - s/p PO Vancomycin 2/1-2/25  - Stool O&P and GI PCR NGTD, c/w Lomotil QID   - GI eval appreciated   -stool more formed as of 3/4, will need at least 48 hrs of formed stool prior to DC. Earliest pt would be able to dc'd is Saturday 3/6  - pending D/C to acute rehab, on hold due to repeat covid positive   - Isolation and quarantine per protocol , D/c Planing to rehab       # ARDS second to COVID vs Multi-bacterial PNA/ R Lung Abscess  - s/p Intubated on 11/23 and c/b multi-bacterial PNA and lung abscesses.   - s/p Tracheostomy on 12/18 by CTSx. Sutures out 1/1  - s/p Air leak noted and s/p Bivona with CTSx 12/31. s/p Downsized to 6DCT 2/25  - s/p Successfully decannulated on 3/1, Now on 2LNC saturating well  - Proventil and Chest PT q6hr PRN      # ARTEMIO s/p CRRT and HD   - ARTEMIO now resolved and no longer required HD.   - Hypokalemia in setting of diarrhea/ CDIFF. Monitor electrolytes.   - Monitor renal function and avoid nephrotoxins.       # Sepsis second to COVID vs Multi-bacterial PNA/ Bacteremia and R Lung Abscess vs CDIFF (+)   - COVID with superimposed multi-bacterial VAP vs aspiration PNA vs cavitary PNA.   - SCx (11/24) MSSA and BCx (11/27) with MSSA and Proteus Bacteremia  - SCx (12/1, 12/12 and 12/27) with Stenotrophomonas.   - SCx (1/9) with  Pseudomonas and Stenotrophomonas   - Bcx has been persistently negative from 12/1, and most recently 1/12   - CT CHEST 12/4 with right lung cavitation.   - s/p multiple antibiotics, but now completed Fortaz and Flagyl (1/12-1/16)  - RPT CT CHEST 1/28 with improvement of right lung abscess   - SCx with  Pseudomonas and Stenotrophomonas and s/p TED/ Fortaz (1/28-2/6)      # DMII   - Continue on ISS   - FS well controlled      # Hx of DVT   - c/w Xarelto      # Contracted Wrists   - PT/OT working on strength   OMT at the bedside       # CODE STATUS - FULL CODE     # DISPO - PM&R recs ACUTE REHAB

## 2021-03-13 LAB
ALBUMIN SERPL ELPH-MCNC: 3.4 G/DL — SIGNIFICANT CHANGE UP (ref 3.3–5)
ALP SERPL-CCNC: 260 U/L — HIGH (ref 40–120)
ALT FLD-CCNC: 100 U/L — HIGH (ref 4–33)
ANION GAP SERPL CALC-SCNC: 15 MMOL/L — HIGH (ref 7–14)
ANISOCYTOSIS BLD QL: SLIGHT — SIGNIFICANT CHANGE UP
APPEARANCE UR: CLEAR — SIGNIFICANT CHANGE UP
AST SERPL-CCNC: 65 U/L — HIGH (ref 4–32)
BACTERIA # UR AUTO: ABNORMAL
BASOPHILS # BLD AUTO: 0.16 K/UL — SIGNIFICANT CHANGE UP (ref 0–0.2)
BASOPHILS NFR BLD AUTO: 0.9 % — SIGNIFICANT CHANGE UP (ref 0–2)
BILIRUB SERPL-MCNC: 0.6 MG/DL — SIGNIFICANT CHANGE UP (ref 0.2–1.2)
BILIRUB UR-MCNC: NEGATIVE — SIGNIFICANT CHANGE UP
BUN SERPL-MCNC: 9 MG/DL — SIGNIFICANT CHANGE UP (ref 7–23)
CALCIUM SERPL-MCNC: 9.8 MG/DL — SIGNIFICANT CHANGE UP (ref 8.4–10.5)
CHLORIDE SERPL-SCNC: 101 MMOL/L — SIGNIFICANT CHANGE UP (ref 98–107)
CO2 SERPL-SCNC: 21 MMOL/L — LOW (ref 22–31)
COLOR SPEC: YELLOW — SIGNIFICANT CHANGE UP
CREAT SERPL-MCNC: 0.34 MG/DL — LOW (ref 0.5–1.3)
DIFF PNL FLD: NEGATIVE — SIGNIFICANT CHANGE UP
EOSINOPHIL # BLD AUTO: 0.3 K/UL — SIGNIFICANT CHANGE UP (ref 0–0.5)
EOSINOPHIL NFR BLD AUTO: 1.7 % — SIGNIFICANT CHANGE UP (ref 0–6)
EPI CELLS # UR: 1 /HPF — SIGNIFICANT CHANGE UP (ref 0–5)
GLUCOSE SERPL-MCNC: 119 MG/DL — HIGH (ref 70–99)
GLUCOSE UR QL: NEGATIVE — SIGNIFICANT CHANGE UP
HCT VFR BLD CALC: 37.6 % — SIGNIFICANT CHANGE UP (ref 34.5–45)
HGB BLD-MCNC: 11.5 G/DL — SIGNIFICANT CHANGE UP (ref 11.5–15.5)
HYALINE CASTS # UR AUTO: 0 /LPF — SIGNIFICANT CHANGE UP (ref 0–7)
IANC: 14.56 K/UL — HIGH (ref 1.5–8.5)
KETONES UR-MCNC: NEGATIVE — SIGNIFICANT CHANGE UP
LEUKOCYTE ESTERASE UR-ACNC: ABNORMAL
LYMPHOCYTES # BLD AUTO: 1.55 K/UL — SIGNIFICANT CHANGE UP (ref 1–3.3)
LYMPHOCYTES # BLD AUTO: 8.7 % — LOW (ref 13–44)
MAGNESIUM SERPL-MCNC: 1.4 MG/DL — LOW (ref 1.6–2.6)
MCHC RBC-ENTMCNC: 27.1 PG — SIGNIFICANT CHANGE UP (ref 27–34)
MCHC RBC-ENTMCNC: 30.6 GM/DL — LOW (ref 32–36)
MCV RBC AUTO: 88.5 FL — SIGNIFICANT CHANGE UP (ref 80–100)
MONOCYTES # BLD AUTO: 0.77 K/UL — SIGNIFICANT CHANGE UP (ref 0–0.9)
MONOCYTES NFR BLD AUTO: 4.3 % — SIGNIFICANT CHANGE UP (ref 2–14)
NEUTROPHILS # BLD AUTO: 15.06 K/UL — HIGH (ref 1.8–7.4)
NEUTROPHILS NFR BLD AUTO: 84.4 % — HIGH (ref 43–77)
NITRITE UR-MCNC: POSITIVE
PH UR: 6 — SIGNIFICANT CHANGE UP (ref 5–8)
PHOSPHATE SERPL-MCNC: 3.1 MG/DL — SIGNIFICANT CHANGE UP (ref 2.5–4.5)
PLAT MORPH BLD: NORMAL — SIGNIFICANT CHANGE UP
PLATELET # BLD AUTO: 427 K/UL — HIGH (ref 150–400)
PLATELET COUNT - ESTIMATE: NORMAL — SIGNIFICANT CHANGE UP
POTASSIUM SERPL-MCNC: 3.6 MMOL/L — SIGNIFICANT CHANGE UP (ref 3.5–5.3)
POTASSIUM SERPL-SCNC: 3.6 MMOL/L — SIGNIFICANT CHANGE UP (ref 3.5–5.3)
PROT SERPL-MCNC: 7.6 G/DL — SIGNIFICANT CHANGE UP (ref 6–8.3)
PROT UR-MCNC: ABNORMAL
RBC # BLD: 4.25 M/UL — SIGNIFICANT CHANGE UP (ref 3.8–5.2)
RBC # FLD: 16.3 % — HIGH (ref 10.3–14.5)
RBC BLD AUTO: NORMAL — SIGNIFICANT CHANGE UP
RBC CASTS # UR COMP ASSIST: 2 /HPF — SIGNIFICANT CHANGE UP (ref 0–4)
SODIUM SERPL-SCNC: 137 MMOL/L — SIGNIFICANT CHANGE UP (ref 135–145)
SP GR SPEC: 1.01 — SIGNIFICANT CHANGE UP (ref 1.01–1.02)
UROBILINOGEN FLD QL: SIGNIFICANT CHANGE UP
WBC # BLD: 17.84 K/UL — HIGH (ref 3.8–10.5)
WBC # FLD AUTO: 17.84 K/UL — HIGH (ref 3.8–10.5)
WBC UR QL: 8 /HPF — HIGH (ref 0–5)

## 2021-03-13 PROCEDURE — 71250 CT THORAX DX C-: CPT | Mod: 26

## 2021-03-13 PROCEDURE — 76705 ECHO EXAM OF ABDOMEN: CPT | Mod: 26

## 2021-03-13 PROCEDURE — 71045 X-RAY EXAM CHEST 1 VIEW: CPT | Mod: 26

## 2021-03-13 RX ORDER — MAGNESIUM OXIDE 400 MG ORAL TABLET 241.3 MG
400 TABLET ORAL
Refills: 0 | Status: COMPLETED | OUTPATIENT
Start: 2021-03-13 | End: 2021-03-13

## 2021-03-13 RX ADMIN — RIVAROXABAN 20 MILLIGRAM(S): KIT at 18:37

## 2021-03-13 RX ADMIN — Medication 0.5 MILLIGRAM(S): at 12:47

## 2021-03-13 RX ADMIN — PANTOPRAZOLE SODIUM 40 MILLIGRAM(S): 20 TABLET, DELAYED RELEASE ORAL at 12:00

## 2021-03-13 RX ADMIN — CHLORHEXIDINE GLUCONATE 1 APPLICATION(S): 213 SOLUTION TOPICAL at 12:04

## 2021-03-13 RX ADMIN — Medication 1 APPLICATION(S): at 18:37

## 2021-03-13 RX ADMIN — AMLODIPINE BESYLATE 5 MILLIGRAM(S): 2.5 TABLET ORAL at 06:34

## 2021-03-13 RX ADMIN — Medication 50 MILLIGRAM(S): at 06:33

## 2021-03-13 RX ADMIN — ALBUTEROL 2 PUFF(S): 90 AEROSOL, METERED ORAL at 18:37

## 2021-03-13 RX ADMIN — MAGNESIUM OXIDE 400 MG ORAL TABLET 400 MILLIGRAM(S): 241.3 TABLET ORAL at 21:50

## 2021-03-13 RX ADMIN — NYSTATIN CREAM 1 APPLICATION(S): 100000 CREAM TOPICAL at 12:06

## 2021-03-13 RX ADMIN — NYSTATIN CREAM 1 APPLICATION(S): 100000 CREAM TOPICAL at 21:50

## 2021-03-13 RX ADMIN — Medication 50 MILLIGRAM(S): at 18:37

## 2021-03-13 RX ADMIN — MAGNESIUM OXIDE 400 MG ORAL TABLET 400 MILLIGRAM(S): 241.3 TABLET ORAL at 18:38

## 2021-03-13 RX ADMIN — Medication 1 TABLET(S): at 06:33

## 2021-03-13 RX ADMIN — Medication 1 APPLICATION(S): at 06:33

## 2021-03-13 RX ADMIN — Medication 1 TABLET(S): at 18:37

## 2021-03-13 RX ADMIN — NYSTATIN CREAM 1 APPLICATION(S): 100000 CREAM TOPICAL at 06:33

## 2021-03-13 RX ADMIN — CHOLESTYRAMINE 4 GRAM(S): 4 POWDER, FOR SUSPENSION ORAL at 09:21

## 2021-03-13 RX ADMIN — CHOLESTYRAMINE 4 GRAM(S): 4 POWDER, FOR SUSPENSION ORAL at 21:50

## 2021-03-13 RX ADMIN — ZINC SULFATE TAB 220 MG (50 MG ZINC EQUIVALENT) 220 MILLIGRAM(S): 220 (50 ZN) TAB at 12:01

## 2021-03-13 RX ADMIN — ALBUTEROL 2 PUFF(S): 90 AEROSOL, METERED ORAL at 12:34

## 2021-03-13 RX ADMIN — MAGNESIUM OXIDE 400 MG ORAL TABLET 400 MILLIGRAM(S): 241.3 TABLET ORAL at 12:02

## 2021-03-13 NOTE — PROGRESS NOTE ADULT - SUBJECTIVE AND OBJECTIVE BOX
Chief Complaint:  Patient is a 60y old  Female who presents with a chief complaint of Transfer from [facility] (12 Mar 2021 09:02)      Interval Events:   no abd pain  no diarrhea    Allergies:  Kiwi (Unknown)  latex (Anaphylaxis)  latex (Unknown)  penicillin (Other)  penicillin (Unknown)  perfume  hives (Other)  potassium acetate (Other)  soap additives hives (Other)      Hospital Medications:  acetaminophen    Suspension .. 650 milliGRAM(s) Oral every 6 hours PRN  ALBUTerol    90 MICROgram(s) HFA Inhaler 2 Puff(s) Inhalation every 6 hours  amLODIPine   Tablet 5 milliGRAM(s) Oral daily  artificial tears (preservative free) Ophthalmic Solution 1 Drop(s) Both EYES every 8 hours PRN  chlorhexidine 4% Liquid 1 Application(s) Topical daily  cholestyramine Powder (Sugar-Free) 4 Gram(s) Oral two times a day  clonazePAM  Tablet 0.5 milliGRAM(s) Oral daily  collagenase Ointment 1 Application(s) Topical two times a day  dextrose 40% Gel 15 Gram(s) Oral once  dextrose 5%. 1000 milliLiter(s) IV Continuous <Continuous>  dextrose 5%. 1000 milliLiter(s) IV Continuous <Continuous>  dextrose 5%. 1000 milliLiter(s) IV Continuous <Continuous>  dextrose 50% Injectable 25 Gram(s) IV Push once  dextrose 50% Injectable 12.5 Gram(s) IV Push once  dextrose 50% Injectable 25 Gram(s) IV Push once  diphenoxylate/atropine 1 Tablet(s) Oral four times a day PRN  glucagon  Injectable 1 milliGRAM(s) IntraMuscular once  guaiFENesin   Syrup  (Sugar-Free) 100 milliGRAM(s) Oral every 6 hours PRN  insulin lispro (ADMELOG) corrective regimen sliding scale   SubCutaneous Before meals and at bedtime  lactobacillus acidophilus 1 Tablet(s) Oral two times a day  metoprolol tartrate 50 milliGRAM(s) Oral two times a day  multivitamin 1 Tablet(s) Oral daily  nystatin Powder 1 Application(s) Topical every 8 hours  pantoprazole  Injectable 40 milliGRAM(s) IV Push daily  rivaroxaban 20 milliGRAM(s) Oral with dinner  zinc sulfate 220 milliGRAM(s) Oral daily      PMHX/PSHX:  Pneumomediastinum    Umbilical hernia    Osteoarthritis of both knees, unspecified osteoarthritis type    AARON (Obstructive Sleep Apnea)    Pneumonia    S/P Colon Resection    pt with allergies to perfumes and addidtive to soaps, requires hypoallergenic sheets and gowns, line    Cervical Dysplasia    Latex Sensitivity    GERD (Gastroesophageal Reflux Disease)    Asthma    Anemia    S/P Joint Replacement    DVT (Deep Venous Thrombosis)    HTN (Hypertension)    H/O ileostomy    S/P LEEP    S/P Exploratory Laparotomy    S/P Colonoscopy    S/P Endoscopy    S/P  Section    History of Tubal Ligation    S/P Knee Surgery    Umbilical Hernia    Osteoarthritis of Knee    HTN (Hypertension)        Family history:  Family history of gastric cancer    Family history of breast cancer (Grandparent)        ROS:     General:  No wt loss, fevers, chills, night sweats, fatigue,   Eyes:  Good vision, no reported pain  ENT:  No sore throat, pain, runny nose, dysphagia  CV:  No pain, palpitations, hypo/hypertension  Resp:  No dyspnea, cough, tachypnea, wheezing  GI:  See HPI  :  No pain, bleeding, incontinence, nocturia  Muscle:  No pain, weakness  Neuro:  No weakness, tingling, memory problems  Psych:  No fatigue, insomnia, mood problems, depression  Endocrine:  No polyuria, polydipsia, cold/heat intolerance  Heme:  No petechiae, ecchymosis, easy bruisability  Skin:  No rash, edema      PHYSICAL EXAM:     GENERAL:  Appears stated age, well-groomed, well-nourished, no distress  HEENT:  NC/AT,  conjunctivae clear, sclera-anicteric  NECK: Trachea midline, supple  CHEST:  Full & symmetric excursion, no increased effort, breath sounds clear  HEART:  Regular rhythm, no lola/heave  ABDOMEN:  Soft, non-tender, non-distended, normoactive bowel sounds,  no masses ,no hepato-splenomegaly, gastrostomy tract closed  EXTREMITIES:  no cyanosis,clubbing or edema, contractures  SKIN:  No rash/erythema/petechiae, no jaundice  NEURO:  Alert, oriented, no asterixis, bedbound  RECTAL: Deferred    Vital Signs:  Vital Signs Last 24 Hrs  T(C): 36.6 (12 Mar 2021 05:55), Max: 36.6 (11 Mar 2021 13:07)  T(F): 97.8 (12 Mar 2021 05:55), Max: 97.9 (11 Mar 2021 13:07)  HR: 88 (12 Mar 2021 05:55) (88 - 98)  BP: 146/61 (12 Mar 2021 05:55) (144/63 - 146/61)  BP(mean): --  RR: 18 (12 Mar 2021 05:55) (16 - 18)  SpO2: 98% (12 Mar 2021 05:55) (97% - 98%)  Daily     Daily Weight in k.8 (12 Mar 2021 05:55)    LABS:                        11.9   8.52  )-----------( 426      ( 11 Mar 2021 07:17 )             41.8     03-11    139  |  103  |  10  ----------------------------<  125<H>  4.0   |  22  |  0.36<L>    Ca    10.2      11 Mar 2021 07:17  Phos  4.1     03-11  Mg     1.8     -    TPro  7.5  /  Alb  3.6  /  TBili  0.4  /  DBili  x   /  AST  64<H>  /  ALT  84<H>  /  AlkPhos  219<H>  -11    LIVER FUNCTIONS - ( 11 Mar 2021 07:17 )  Alb: 3.6 g/dL / Pro: 7.5 g/dL / ALK PHOS: 219 U/L / ALT: 84 U/L / AST: 64 U/L / GGT: x                   Imaging:

## 2021-03-13 NOTE — PROGRESS NOTE ADULT - ASSESSMENT
60 year old female with prolonged hospital course secondary to COVID 19, GI consulted for persistent diarrhea     Problem/Recommendation - 1:  Problem: COVID-19. Recommendation: status post treatment by infectious disease  now off of isolation.     Problem/Recommendation - 2:  ·  Problem: Leukocytosis: unclear etiology. New from yesterday. No diarrhea.  -trend WBC  -observe for any focal symptoms     Problem/Recommendation - 3:  ·  Problem: Diarrhea.  Recommendation: now improved, was most likely due to tube feeds. Now tolerating regular food.  -lomotil PRN     Problem/Recommendation - 4:  ·  Problem: Hernia, incisional.  Recommendation: reducible, nontender. No signs of incarceration.   -serial abdominal exams.      Problem/Recommendation - 5:  ·  Problem: Gastrostomy present.  Recommendation: patient tolerating good PO intake. Passed swallow eval. Gastrostomy removed at bedside and bandaged.     Problem/Recommendation - 6:  Problem: ARTEMIO (acute kidney injury). Recommendation: s/p CVVHD, now improved.     Problem/Recommendation - 7:  Problem: History of pressure ulcer. Recommendation: per RCU team.     Problem/Recommendation - 8:  Problem: Clostridium difficile infection. Recommendation: status post 2 weeks of PO vanco  -vanco per ID on hold.     Problem/Recommendation - 9:  Problem: Abnormal LFTs, ALK phos predominant, most likely delayed presentation of mild "COVID cholangiopathy". a  -periodic LFT measurements.    Attending Attestation:   Differential diagnosis and plan of care discussed with patient after the evaluation  75 Minutes spent on total encounter of which more than fifty percent of the encounter was spent counseling and/or coordinating care by the attending physician.      Ranjeet Ponce M.D.   Gastroenterology and Hepatology  Cell: [phone].

## 2021-03-13 NOTE — PROGRESS NOTE ADULT - SUBJECTIVE AND OBJECTIVE BOX
DATE OF SERVICE: 21 @ 19:44    Subjective: Patient seen and examined. No new events except as noted.     REVIEW OF SYSTEMS:    CONSTITUTIONAL: No weakness, fevers or chills  EYES/ENT: No visual changes;  No vertigo or throat pain   NECK: No pain or stiffness  RESPIRATORY: No cough, wheezing, hemoptysis; No shortness of breath  CARDIOVASCULAR: No chest pain or palpitations  GASTROINTESTINAL: No abdominal or epigastric pain. No nausea, vomiting, or hematemesis; No diarrhea or constipation. No melena or hematochezia.  GENITOURINARY: No dysuria, frequency or hematuria  NEUROLOGICAL: No numbness or weakness  SKIN: No itching, burning, rashes, or lesions   All other review of systems is negative unless indicated above.    MEDICATIONS:  MEDICATIONS  (STANDING):  ALBUTerol    90 MICROgram(s) HFA Inhaler 2 Puff(s) Inhalation every 6 hours  amLODIPine   Tablet 5 milliGRAM(s) Oral daily  chlorhexidine 4% Liquid 1 Application(s) Topical daily  cholestyramine Powder (Sugar-Free) 4 Gram(s) Oral two times a day  clonazePAM  Tablet 0.5 milliGRAM(s) Oral daily  collagenase Ointment 1 Application(s) Topical two times a day  dextrose 40% Gel 15 Gram(s) Oral once  dextrose 5%. 1000 milliLiter(s) (50 mL/Hr) IV Continuous <Continuous>  dextrose 5%. 1000 milliLiter(s) (100 mL/Hr) IV Continuous <Continuous>  dextrose 5%. 1000 milliLiter(s) (75 mL/Hr) IV Continuous <Continuous>  dextrose 50% Injectable 25 Gram(s) IV Push once  dextrose 50% Injectable 12.5 Gram(s) IV Push once  dextrose 50% Injectable 25 Gram(s) IV Push once  glucagon  Injectable 1 milliGRAM(s) IntraMuscular once  insulin lispro (ADMELOG) corrective regimen sliding scale   SubCutaneous Before meals and at bedtime  lactobacillus acidophilus 1 Tablet(s) Oral two times a day  magnesium oxide 400 milliGRAM(s) Oral three times a day with meals  metoprolol tartrate 50 milliGRAM(s) Oral two times a day  multivitamin 1 Tablet(s) Oral daily  nystatin Powder 1 Application(s) Topical every 8 hours  pantoprazole  Injectable 40 milliGRAM(s) IV Push daily  rivaroxaban 20 milliGRAM(s) Oral with dinner  zinc sulfate 220 milliGRAM(s) Oral daily      PHYSICAL EXAM:  T(C): 36.7 (21 @ 18:33), Max: 37.4 (21 @ 21:12)  HR: 102 (21 @ 18:33) (89 - 112)  BP: 145/68 (21 @ 18:33) (137/52 - 157/68)  RR: 16 (21 @ 18:33) (11 - 18)  SpO2: 94% (21 @ 18:33) (94% - 98%)  Wt(kg): --  I&O's Summary        Appearance: Normal	  HEENT:  PERRLA   Lymphatic: No lymphadenopathy   Cardiovascular: Normal S1 S2, no JVD  Respiratory: normal effort , clear  Gastrointestinal:  Soft, Non-tender  Skin: No rashes,  warm to touch  Psychiatry:  Mood & affect appropriate  Musculuskeletal: No edema      All labs, Imaging and EKGs personally reviewed                           11.5   17.84 )-----------( 427      ( 13 Mar 2021 02:44 )             37.6               -    137  |  101  |  9   ----------------------------<  119<H>  3.6   |  21<L>  |  0.34<L>    Ca    9.8      13 Mar 2021 02:44  Phos  3.1       Mg     1.4         TPro  7.6  /  Alb  3.4  /  TBili  0.6  /  DBili  x   /  AST  65<H>  /  ALT  100<H>  /  AlkPhos  260<H>                         Urinalysis Basic - ( 13 Mar 2021 18:52 )    Color: Yellow / Appearance: Clear / S.010 / pH: x  Gluc: x / Ketone: Negative  / Bili: Negative / Urobili: <2 mg/dL   Blood: x / Protein: Trace / Nitrite: Positive   Leuk Esterase: Moderate / RBC: 2 /HPF / WBC 8 /HPF   Sq Epi: x / Non Sq Epi: 1 /HPF / Bacteria: Many

## 2021-03-13 NOTE — PROGRESS NOTE ADULT - ASSESSMENT
61 YO F with PMHx of HTN, DVT on Xarelto, Peritonitis s/p Oral's Procedure and Ileostomy (reversed in 2011) and AARON who presented to [facility] on 11/18 for AHRF second to COVID 19, intubated on 11/23 complicated by lung abscesses on CT CHEST 12/5, multi-bacterial PNA and bacteremia, ARTEMIO s/p CRRT and HD, and s/p Tracheostomy 12/18. Transferred to RCU for further management, now transferred to . Pt now decannulated and PEG removed, eating dysphagia diet, pending dc to acute rehab.     # LEukocytosis    infectious work up  pan Cx  CXR   hold antibiotics  if febrile, obtain new set on cultures and start vanc and zosyn for broad coverage     # CDIFF (+)   - s/p PO Vancomycin 2/1-2/25  - Stool O&P and GI PCR NGTD, c/w Lomotil QID   - GI eval appreciated   -stool more formed , no further diarrhea   - pending D/C to acute rehab, on hold due to repeat covid positive   - Isolation and quarantine per protocol , D/c Planing to rehab       # ARDS second to COVID vs Multi-bacterial PNA/ R Lung Abscess  - s/p Intubated on 11/23 and c/b multi-bacterial PNA and lung abscesses.   - s/p Tracheostomy on 12/18 by CTSx. Sutures out 1/1  - s/p Air leak noted and s/p Bivona with CTSx 12/31. s/p Downsized to 6DCT 2/25  - s/p Successfully decannulated on 3/1, Now on 2LNC saturating well  - Proventil and Chest PT q6hr PRN      # ARTEMIO s/p CRRT and HD   - ARTEMIO now resolved and no longer required HD.   - Hypokalemia in setting of diarrhea/ CDIFF. Monitor electrolytes.   - Monitor renal function and avoid nephrotoxins.       # Sepsis second to COVID vs Multi-bacterial PNA/ Bacteremia and R Lung Abscess vs CDIFF (+)   - COVID with superimposed multi-bacterial VAP vs aspiration PNA vs cavitary PNA.   - SCx (11/24) MSSA and BCx (11/27) with MSSA and Proteus Bacteremia  - SCx (12/1, 12/12 and 12/27) with Stenotrophomonas.   - SCx (1/9) with  Pseudomonas and Stenotrophomonas   - Bcx has been persistently negative from 12/1, and most recently 1/12   - CT CHEST 12/4 with right lung cavitation.   - s/p multiple antibiotics, but now completed Fortaz and Flagyl (1/12-1/16)  - RPT CT CHEST 1/28 with improvement of right lung abscess   - SCx with  Pseudomonas and Stenotrophomonas and s/p TED/ Fortaz (1/28-2/6)      # DMII   - Continue on ISS   - FS well controlled      # Hx of DVT   - c/w Xarelto      # Contracted Wrists   - PT/OT working on strength   OMT at the bedside       # CODE STATUS - FULL CODE     # DISPO - PM&R recs ACUTE REHAB

## 2021-03-14 LAB
ALBUMIN SERPL ELPH-MCNC: 3.4 G/DL — SIGNIFICANT CHANGE UP (ref 3.3–5)
ALP SERPL-CCNC: 210 U/L — HIGH (ref 40–120)
ALT FLD-CCNC: 71 U/L — HIGH (ref 4–33)
ANION GAP SERPL CALC-SCNC: 12 MMOL/L — SIGNIFICANT CHANGE UP (ref 7–14)
APPEARANCE UR: CLEAR — SIGNIFICANT CHANGE UP
AST SERPL-CCNC: 38 U/L — HIGH (ref 4–32)
BACTERIA # UR AUTO: ABNORMAL
BILIRUB SERPL-MCNC: 0.6 MG/DL — SIGNIFICANT CHANGE UP (ref 0.2–1.2)
BILIRUB UR-MCNC: NEGATIVE — SIGNIFICANT CHANGE UP
BUN SERPL-MCNC: 10 MG/DL — SIGNIFICANT CHANGE UP (ref 7–23)
CALCIUM SERPL-MCNC: 9.8 MG/DL — SIGNIFICANT CHANGE UP (ref 8.4–10.5)
CHLORIDE SERPL-SCNC: 100 MMOL/L — SIGNIFICANT CHANGE UP (ref 98–107)
CO2 SERPL-SCNC: 25 MMOL/L — SIGNIFICANT CHANGE UP (ref 22–31)
COLOR SPEC: YELLOW — SIGNIFICANT CHANGE UP
CREAT SERPL-MCNC: 0.42 MG/DL — LOW (ref 0.5–1.3)
DIFF PNL FLD: NEGATIVE — SIGNIFICANT CHANGE UP
EPI CELLS # UR: 0 /HPF — SIGNIFICANT CHANGE UP (ref 0–5)
GLUCOSE SERPL-MCNC: 122 MG/DL — HIGH (ref 70–99)
GLUCOSE UR QL: NEGATIVE — SIGNIFICANT CHANGE UP
HCT VFR BLD CALC: 38.7 % — SIGNIFICANT CHANGE UP (ref 34.5–45)
HGB BLD-MCNC: 11.7 G/DL — SIGNIFICANT CHANGE UP (ref 11.5–15.5)
KETONES UR-MCNC: NEGATIVE — SIGNIFICANT CHANGE UP
LEUKOCYTE ESTERASE UR-ACNC: ABNORMAL
MCHC RBC-ENTMCNC: 26.7 PG — LOW (ref 27–34)
MCHC RBC-ENTMCNC: 30.2 GM/DL — LOW (ref 32–36)
MCV RBC AUTO: 88.2 FL — SIGNIFICANT CHANGE UP (ref 80–100)
NITRITE UR-MCNC: POSITIVE
NRBC # BLD: 0 /100 WBCS — SIGNIFICANT CHANGE UP
NRBC # FLD: 0 K/UL — SIGNIFICANT CHANGE UP
PH UR: 5.5 — SIGNIFICANT CHANGE UP (ref 5–8)
PLATELET # BLD AUTO: 354 K/UL — SIGNIFICANT CHANGE UP (ref 150–400)
POTASSIUM SERPL-MCNC: 3.5 MMOL/L — SIGNIFICANT CHANGE UP (ref 3.5–5.3)
POTASSIUM SERPL-SCNC: 3.5 MMOL/L — SIGNIFICANT CHANGE UP (ref 3.5–5.3)
PROT SERPL-MCNC: 7.3 G/DL — SIGNIFICANT CHANGE UP (ref 6–8.3)
PROT UR-MCNC: ABNORMAL
RBC # BLD: 4.39 M/UL — SIGNIFICANT CHANGE UP (ref 3.8–5.2)
RBC # FLD: 16.5 % — HIGH (ref 10.3–14.5)
RBC CASTS # UR COMP ASSIST: 2 /HPF — SIGNIFICANT CHANGE UP (ref 0–4)
SODIUM SERPL-SCNC: 137 MMOL/L — SIGNIFICANT CHANGE UP (ref 135–145)
SP GR SPEC: 1.02 — SIGNIFICANT CHANGE UP (ref 1.01–1.02)
UROBILINOGEN FLD QL: SIGNIFICANT CHANGE UP
WBC # BLD: 11.74 K/UL — HIGH (ref 3.8–10.5)
WBC # FLD AUTO: 11.74 K/UL — HIGH (ref 3.8–10.5)
WBC UR QL: 31 /HPF — HIGH (ref 0–5)

## 2021-03-14 RX ADMIN — ALBUTEROL 2 PUFF(S): 90 AEROSOL, METERED ORAL at 06:09

## 2021-03-14 RX ADMIN — Medication 1 TABLET(S): at 17:38

## 2021-03-14 RX ADMIN — NYSTATIN CREAM 1 APPLICATION(S): 100000 CREAM TOPICAL at 22:39

## 2021-03-14 RX ADMIN — AMLODIPINE BESYLATE 5 MILLIGRAM(S): 2.5 TABLET ORAL at 06:09

## 2021-03-14 RX ADMIN — CHLORHEXIDINE GLUCONATE 1 APPLICATION(S): 213 SOLUTION TOPICAL at 11:38

## 2021-03-14 RX ADMIN — ALBUTEROL 2 PUFF(S): 90 AEROSOL, METERED ORAL at 11:38

## 2021-03-14 RX ADMIN — Medication 50 MILLIGRAM(S): at 17:38

## 2021-03-14 RX ADMIN — Medication 1 APPLICATION(S): at 06:09

## 2021-03-14 RX ADMIN — CHOLESTYRAMINE 4 GRAM(S): 4 POWDER, FOR SUSPENSION ORAL at 11:37

## 2021-03-14 RX ADMIN — CHOLESTYRAMINE 4 GRAM(S): 4 POWDER, FOR SUSPENSION ORAL at 22:39

## 2021-03-14 RX ADMIN — NYSTATIN CREAM 1 APPLICATION(S): 100000 CREAM TOPICAL at 06:09

## 2021-03-14 RX ADMIN — ALBUTEROL 2 PUFF(S): 90 AEROSOL, METERED ORAL at 17:38

## 2021-03-14 RX ADMIN — PANTOPRAZOLE SODIUM 40 MILLIGRAM(S): 20 TABLET, DELAYED RELEASE ORAL at 11:41

## 2021-03-14 RX ADMIN — Medication 1 TABLET(S): at 06:09

## 2021-03-14 RX ADMIN — NYSTATIN CREAM 1 APPLICATION(S): 100000 CREAM TOPICAL at 11:42

## 2021-03-14 RX ADMIN — Medication 1 DROP(S): at 11:42

## 2021-03-14 RX ADMIN — ZINC SULFATE TAB 220 MG (50 MG ZINC EQUIVALENT) 220 MILLIGRAM(S): 220 (50 ZN) TAB at 11:41

## 2021-03-14 RX ADMIN — Medication 50 MILLIGRAM(S): at 06:09

## 2021-03-14 RX ADMIN — Medication 0.5 MILLIGRAM(S): at 11:40

## 2021-03-14 RX ADMIN — RIVAROXABAN 20 MILLIGRAM(S): KIT at 17:38

## 2021-03-14 RX ADMIN — Medication 1 APPLICATION(S): at 17:38

## 2021-03-14 NOTE — PROGRESS NOTE ADULT - SUBJECTIVE AND OBJECTIVE BOX
Chief Complaint:  Patient is a 60y old  Female who presents with a chief complaint of Transfer from [facility] (13 Mar 2021 14:44)      Interval Events:   no abdominal pain  no diarrhea    Allergies:  Kiwi (Unknown)  latex (Anaphylaxis)  latex (Unknown)  penicillin (Other)  penicillin (Unknown)  perfume  hives (Other)  potassium acetate (Other)  soap additives hives (Other)      Hospital Medications:  acetaminophen    Suspension .. 650 milliGRAM(s) Oral every 6 hours PRN  ALBUTerol    90 MICROgram(s) HFA Inhaler 2 Puff(s) Inhalation every 6 hours  amLODIPine   Tablet 5 milliGRAM(s) Oral daily  artificial tears (preservative free) Ophthalmic Solution 1 Drop(s) Both EYES every 8 hours PRN  chlorhexidine 4% Liquid 1 Application(s) Topical daily  cholestyramine Powder (Sugar-Free) 4 Gram(s) Oral two times a day  clonazePAM  Tablet 0.5 milliGRAM(s) Oral daily  collagenase Ointment 1 Application(s) Topical two times a day  dextrose 40% Gel 15 Gram(s) Oral once  dextrose 5%. 1000 milliLiter(s) IV Continuous <Continuous>  dextrose 5%. 1000 milliLiter(s) IV Continuous <Continuous>  dextrose 5%. 1000 milliLiter(s) IV Continuous <Continuous>  dextrose 50% Injectable 25 Gram(s) IV Push once  dextrose 50% Injectable 12.5 Gram(s) IV Push once  dextrose 50% Injectable 25 Gram(s) IV Push once  diphenoxylate/atropine 1 Tablet(s) Oral four times a day PRN  glucagon  Injectable 1 milliGRAM(s) IntraMuscular once  guaiFENesin   Syrup  (Sugar-Free) 100 milliGRAM(s) Oral every 6 hours PRN  insulin lispro (ADMELOG) corrective regimen sliding scale   SubCutaneous Before meals and at bedtime  lactobacillus acidophilus 1 Tablet(s) Oral two times a day  metoprolol tartrate 50 milliGRAM(s) Oral two times a day  multivitamin 1 Tablet(s) Oral daily  nystatin Powder 1 Application(s) Topical every 8 hours  pantoprazole  Injectable 40 milliGRAM(s) IV Push daily  rivaroxaban 20 milliGRAM(s) Oral with dinner  zinc sulfate 220 milliGRAM(s) Oral daily      PMHX/PSHX:  Pneumomediastinum    Umbilical hernia    Osteoarthritis of both knees, unspecified osteoarthritis type    AARON (Obstructive Sleep Apnea)    Pneumonia    S/P Colon Resection    pt with allergies to perfumes and addidtive to soaps, requires hypoallergenic sheets and gowns, line    Cervical Dysplasia    Latex Sensitivity    GERD (Gastroesophageal Reflux Disease)    Asthma    Anemia    S/P Joint Replacement    DVT (Deep Venous Thrombosis)    HTN (Hypertension)    H/O ileostomy    S/P LEEP    S/P Exploratory Laparotomy    S/P Colonoscopy    S/P Endoscopy    S/P  Section    History of Tubal Ligation    S/P Knee Surgery    Umbilical Hernia    Osteoarthritis of Knee    HTN (Hypertension)        Family history:  Family history of gastric cancer    Family history of breast cancer (Grandparent)        ROS:     General:  No wt loss, fevers, chills, night sweats, fatigue,   Eyes:  Good vision, no reported pain  ENT:  No sore throat, pain, runny nose, dysphagia  CV:  No pain, palpitations, hypo/hypertension  Resp:  No dyspnea, cough, tachypnea, wheezing  GI:  See HPI  :  No pain, bleeding, incontinence, nocturia  Muscle:  No pain, weakness  Neuro:  No weakness, tingling, memory problems  Psych:  No fatigue, insomnia, mood problems, depression  Endocrine:  No polyuria, polydipsia, cold/heat intolerance  Heme:  No petechiae, ecchymosis, easy bruisability  Skin:  No rash, edema      PHYSICAL EXAM:     GENERAL:  Appears stated age, well-groomed, well-nourished, no distress  HEENT:  NC/AT,  conjunctivae clear, sclera-anicteric  NECK: Trachea midline, supple  CHEST:  Full & symmetric excursion, no increased effort, breath sounds clear  HEART:  Regular rhythm, no lola/heave  ABDOMEN:  Soft, non-tender, non-distended, normoactive bowel sounds,  no masses ,no hepato-splenomegaly,   EXTREMITIES:  no cyanosis,clubbing or edema  SKIN:  No rash/erythema/petechiae, no jaundice  NEURO:  Alert, oriented, no asterixis  RECTAL: Deferred    Vital Signs:  Vital Signs Last 24 Hrs  T(C): 36.8 (14 Mar 2021 06:02), Max: 37.3 (13 Mar 2021 21:45)  T(F): 98.3 (14 Mar 2021 06:02), Max: 99.1 (13 Mar 2021 21:45)  HR: 88 (14 Mar 2021 06:02) (86 - 102)  BP: 157/80 (14 Mar 2021 06:02) (129/50 - 157/80)  BP(mean): --  RR: 17 (14 Mar 2021 06:02) (16 - 17)  SpO2: 94% (14 Mar 2021 06:02) (92% - 94%)  Daily     Daily     LABS:                        11.7   11.74 )-----------( 354      ( 14 Mar 2021 06:44 )             38.7     03-14    137  |  100  |  10  ----------------------------<  122<H>  3.5   |  25  |  0.42<L>    Ca    9.8      14 Mar 2021 06:44  Phos  3.1     03-13  Mg     1.4     03-13    TPro  7.3  /  Alb  3.4  /  TBili  0.6  /  DBili  x   /  AST  38<H>  /  ALT  71<H>  /  AlkPhos  210<H>  03-14    LIVER FUNCTIONS - ( 14 Mar 2021 06:44 )  Alb: 3.4 g/dL / Pro: 7.3 g/dL / ALK PHOS: 210 U/L / ALT: 71 U/L / AST: 38 U/L / GGT: x             Urinalysis Basic - ( 14 Mar 2021 07:19 )    Color: Yellow / Appearance: Clear / S.016 / pH: x  Gluc: x / Ketone: Negative  / Bili: Negative / Urobili: <2 mg/dL   Blood: x / Protein: Trace / Nitrite: Positive   Leuk Esterase: Large / RBC: 2 /HPF / WBC 31 /HPF   Sq Epi: x / Non Sq Epi: 0 /HPF / Bacteria: Many          Imaging:

## 2021-03-14 NOTE — PROGRESS NOTE ADULT - SUBJECTIVE AND OBJECTIVE BOX
DATE OF SERVICE: 21     Subjective: Patient seen and examined. No new events except as noted.   doing okay  leukocytosis tredning down  no fever     REVIEW OF SYSTEMS:    CONSTITUTIONAL: No weakness, fevers or chills  EYES/ENT: No visual changes;  No vertigo or throat pain   NECK: No pain or stiffness  RESPIRATORY: No cough, wheezing, hemoptysis; No shortness of breath  CARDIOVASCULAR: No chest pain or palpitations  GASTROINTESTINAL: No abdominal or epigastric pain. No nausea, vomiting, or hematemesis; No diarrhea or constipation. No melena or hematochezia.  GENITOURINARY: No dysuria, frequency or hematuria  NEUROLOGICAL: No numbness or weakness  SKIN: No itching, burning, rashes, or lesions   All other review of systems is negative unless indicated above.    MEDICATIONS:  MEDICATIONS  (STANDING):  ALBUTerol    90 MICROgram(s) HFA Inhaler 2 Puff(s) Inhalation every 6 hours  amLODIPine   Tablet 5 milliGRAM(s) Oral daily  chlorhexidine 4% Liquid 1 Application(s) Topical daily  cholestyramine Powder (Sugar-Free) 4 Gram(s) Oral two times a day  clonazePAM  Tablet 0.5 milliGRAM(s) Oral daily  collagenase Ointment 1 Application(s) Topical two times a day  dextrose 40% Gel 15 Gram(s) Oral once  dextrose 5%. 1000 milliLiter(s) (50 mL/Hr) IV Continuous <Continuous>  dextrose 5%. 1000 milliLiter(s) (100 mL/Hr) IV Continuous <Continuous>  dextrose 5%. 1000 milliLiter(s) (75 mL/Hr) IV Continuous <Continuous>  dextrose 50% Injectable 25 Gram(s) IV Push once  dextrose 50% Injectable 12.5 Gram(s) IV Push once  dextrose 50% Injectable 25 Gram(s) IV Push once  glucagon  Injectable 1 milliGRAM(s) IntraMuscular once  insulin lispro (ADMELOG) corrective regimen sliding scale   SubCutaneous Before meals and at bedtime  lactobacillus acidophilus 1 Tablet(s) Oral two times a day  metoprolol tartrate 50 milliGRAM(s) Oral two times a day  multivitamin 1 Tablet(s) Oral daily  nystatin Powder 1 Application(s) Topical every 8 hours  pantoprazole  Injectable 40 milliGRAM(s) IV Push daily  rivaroxaban 20 milliGRAM(s) Oral with dinner  zinc sulfate 220 milliGRAM(s) Oral daily      PHYSICAL EXAM:  T(C): 37 (21 @ 17:35), Max: 37.3 (21 @ 21:45)  HR: 101 (21 @ 17:35) (86 - 101)  BP: 144/80 (21 @ 17:35) (129/50 - 157/80)  RR: 18 (21 @ 17:35) (16 - 18)  SpO2: 92% (21 @ 17:35) (92% - 94%)  Wt(kg): --  I&O's Summary        Appearance: Normal	  HEENT:  PERRLA   Lymphatic: No lymphadenopathy   Cardiovascular: Normal S1 S2, no JVD  Respiratory: normal effort , clear  Gastrointestinal:  Soft, Non-tender  Skin: No rashes,  warm to touch  Psychiatry:  Mood & affect appropriate  Musculuskeletal: No edema      All labs, Imaging and EKGs personally reviewed                             11.7   11.74 )-----------( 354      ( 14 Mar 2021 06:44 )             38.7                   137  |  100  |  10  ----------------------------<  122<H>  3.5   |  25  |  0.42<L>    Ca    9.8      14 Mar 2021 06:44  Phos  3.1     -  Mg     1.4         TPro  7.3  /  Alb  3.4  /  TBili  0.6  /  DBili  x   /  AST  38<H>  /  ALT  71<H>  /  AlkPhos  210<H>                         Urinalysis Basic - ( 14 Mar 2021 07:19 )    Color: Yellow / Appearance: Clear / S.016 / pH: x  Gluc: x / Ketone: Negative  / Bili: Negative / Urobili: <2 mg/dL   Blood: x / Protein: Trace / Nitrite: Positive   Leuk Esterase: Large / RBC: 2 /HPF / WBC 31 /HPF   Sq Epi: x / Non Sq Epi: 0 /HPF / Bacteria: Many

## 2021-03-14 NOTE — PROGRESS NOTE ADULT - ASSESSMENT
60 year old female with prolonged hospital course secondary to COVID 19, GI consulted for persistent diarrhea     Problem/Recommendation - 1:  Problem: COVID-19. Recommendation: status post treatment by infectious disease  now off of isolation.     Problem/Recommendation - 2:  ·  Problem: Leukocytosis: unclear etiology. Improved to 11 from 17 yesterday. No diarrhea, doubt c-diff  -trend WBC  -observe for any focal symptoms     Problem/Recommendation - 3:  ·  Problem: Diarrhea.  Recommendation: now improved, was most likely due to tube feeds. Now tolerating regular food.  -lomotil PRN     Problem/Recommendation - 4:  ·  Problem: Hernia, incisional.  Recommendation: reducible, nontender. No signs of incarceration.   -serial abdominal exams.      Problem/Recommendation - 5:  ·  Problem: Gastrostomy present.  Recommendation: patient tolerating good PO intake. Passed swallow eval. Gastrostomy removed at bedside and bandaged.     Problem/Recommendation - 6:  Problem: ARTEMIO (acute kidney injury). Recommendation: s/p CVVHD, now improved.     Problem/Recommendation - 7:  Problem: History of pressure ulcer. Recommendation: per RCU team.     Problem/Recommendation - 8:  Problem: Clostridium difficile infection. Recommendation: status post 2 weeks of PO vanco  -vanco per ID on hold.     Problem/Recommendation - 9:  Problem: Abnormal LFTs, ALK phos predominant, most likely delayed presentation of mild "COVID cholangiopathy". a  -periodic LFT measurements.    Attending Attestation:   Differential diagnosis and plan of care discussed with patient after the evaluation  75 Minutes spent on total encounter of which more than fifty percent of the encounter was spent counseling and/or coordinating care by the attending physician.      Ranjeet Ponce M.D.   Gastroenterology and Hepatology  Cell: [phone].

## 2021-03-14 NOTE — PROGRESS NOTE ADULT - ASSESSMENT
59 YO F with PMHx of HTN, DVT on Xarelto, Peritonitis s/p Oral's Procedure and Ileostomy (reversed in 2011) and AARON who presented to [facility] on 11/18 for AHRF second to COVID 19, intubated on 11/23 complicated by lung abscesses on CT CHEST 12/5, multi-bacterial PNA and bacteremia, ARTEMIO s/p CRRT and HD, and s/p Tracheostomy 12/18. Transferred to RCU for further management, now transferred to . Pt now decannulated and PEG removed, eating dysphagia diet, pending dc to acute rehab.     # LEukocytosis   trending down   infectious work up negative to date, UA positive, pending urine culture   pan Cx  CXR   CT Chest noted   hold antibiotics  if febrile, obtain new set on cultures and start vanc and zosyn for broad coverage     # CDIFF (+)   - s/p PO Vancomycin 2/1-2/25  - Stool O&P and GI PCR NGTD, c/w Lomotil QID   - GI eval appreciated   -stool more formed , no further diarrhea   - pending D/C to acute rehab, on hold due to repeat covid positive   - Isolation and quarantine per protocol , D/c Planing to rehab       # ARDS second to COVID vs Multi-bacterial PNA/ R Lung Abscess  - s/p Intubated on 11/23 and c/b multi-bacterial PNA and lung abscesses.   - s/p Tracheostomy on 12/18 by CTSx. Sutures out 1/1  - s/p Air leak noted and s/p Bivona with CTSx 12/31. s/p Downsized to 6DCT 2/25  - s/p Successfully decannulated on 3/1, Now on 2LNC saturating well  - Proventil and Chest PT q6hr PRN      # ARTEMIO s/p CRRT and HD   - ARTEMIO now resolved and no longer required HD.   - Hypokalemia in setting of diarrhea/ CDIFF. Monitor electrolytes.   - Monitor renal function and avoid nephrotoxins.       # Sepsis second to COVID vs Multi-bacterial PNA/ Bacteremia and R Lung Abscess vs CDIFF (+)   - COVID with superimposed multi-bacterial VAP vs aspiration PNA vs cavitary PNA.   - SCx (11/24) MSSA and BCx (11/27) with MSSA and Proteus Bacteremia  - SCx (12/1, 12/12 and 12/27) with Stenotrophomonas.   - SCx (1/9) with  Pseudomonas and Stenotrophomonas   - Bcx has been persistently negative from 12/1, and most recently 1/12   - CT CHEST 12/4 with right lung cavitation.   - s/p multiple antibiotics, but now completed Fortaz and Flagyl (1/12-1/16)  - RPT CT CHEST 1/28 with improvement of right lung abscess   - SCx with  Pseudomonas and Stenotrophomonas and s/p TED/ Fortaz (1/28-2/6)      # DMII   - Continue on ISS   - FS well controlled      # Hx of DVT   - c/w Xarelto      # Contracted Wrists   - PT/OT working on strength   OMT at the bedside       # CODE STATUS - FULL CODE     # DISPO - PM&R recs ACUTE REHAB

## 2021-03-15 LAB
ALBUMIN SERPL ELPH-MCNC: 3.3 G/DL — SIGNIFICANT CHANGE UP (ref 3.3–5)
ALP SERPL-CCNC: 235 U/L — HIGH (ref 40–120)
ALT FLD-CCNC: 88 U/L — HIGH (ref 4–33)
ANION GAP SERPL CALC-SCNC: 11 MMOL/L — SIGNIFICANT CHANGE UP (ref 7–14)
APPEARANCE UR: ABNORMAL
AST SERPL-CCNC: 67 U/L — HIGH (ref 4–32)
BACTERIA # UR AUTO: ABNORMAL
BASOPHILS # BLD AUTO: 0.07 K/UL — SIGNIFICANT CHANGE UP (ref 0–0.2)
BASOPHILS NFR BLD AUTO: 0.7 % — SIGNIFICANT CHANGE UP (ref 0–2)
BILIRUB SERPL-MCNC: 0.5 MG/DL — SIGNIFICANT CHANGE UP (ref 0.2–1.2)
BILIRUB UR-MCNC: NEGATIVE — SIGNIFICANT CHANGE UP
BUN SERPL-MCNC: 9 MG/DL — SIGNIFICANT CHANGE UP (ref 7–23)
CALCIUM SERPL-MCNC: 10.1 MG/DL — SIGNIFICANT CHANGE UP (ref 8.4–10.5)
CHLORIDE SERPL-SCNC: 104 MMOL/L — SIGNIFICANT CHANGE UP (ref 98–107)
CO2 SERPL-SCNC: 23 MMOL/L — SIGNIFICANT CHANGE UP (ref 22–31)
COLOR SPEC: YELLOW — SIGNIFICANT CHANGE UP
CREAT SERPL-MCNC: 0.38 MG/DL — LOW (ref 0.5–1.3)
DIFF PNL FLD: NEGATIVE — SIGNIFICANT CHANGE UP
EOSINOPHIL # BLD AUTO: 0.56 K/UL — HIGH (ref 0–0.5)
EOSINOPHIL NFR BLD AUTO: 5.7 % — SIGNIFICANT CHANGE UP (ref 0–6)
EPI CELLS # UR: 0 /HPF — SIGNIFICANT CHANGE UP (ref 0–5)
GLUCOSE SERPL-MCNC: 118 MG/DL — HIGH (ref 70–99)
GLUCOSE UR QL: NEGATIVE — SIGNIFICANT CHANGE UP
HCT VFR BLD CALC: 38.8 % — SIGNIFICANT CHANGE UP (ref 34.5–45)
HGB BLD-MCNC: 11.6 G/DL — SIGNIFICANT CHANGE UP (ref 11.5–15.5)
HYALINE CASTS # UR AUTO: 0 /LPF — SIGNIFICANT CHANGE UP (ref 0–7)
IANC: 6.37 K/UL — SIGNIFICANT CHANGE UP (ref 1.5–8.5)
IMM GRANULOCYTES NFR BLD AUTO: 0.8 % — SIGNIFICANT CHANGE UP (ref 0–1.5)
KETONES UR-MCNC: NEGATIVE — SIGNIFICANT CHANGE UP
LEUKOCYTE ESTERASE UR-ACNC: ABNORMAL
LYMPHOCYTES # BLD AUTO: 2.17 K/UL — SIGNIFICANT CHANGE UP (ref 1–3.3)
LYMPHOCYTES # BLD AUTO: 22.3 % — SIGNIFICANT CHANGE UP (ref 13–44)
MAGNESIUM SERPL-MCNC: 1.8 MG/DL — SIGNIFICANT CHANGE UP (ref 1.6–2.6)
MCHC RBC-ENTMCNC: 26.5 PG — LOW (ref 27–34)
MCHC RBC-ENTMCNC: 29.9 GM/DL — LOW (ref 32–36)
MCV RBC AUTO: 88.6 FL — SIGNIFICANT CHANGE UP (ref 80–100)
MONOCYTES # BLD AUTO: 0.49 K/UL — SIGNIFICANT CHANGE UP (ref 0–0.9)
MONOCYTES NFR BLD AUTO: 5 % — SIGNIFICANT CHANGE UP (ref 2–14)
NEUTROPHILS # BLD AUTO: 6.37 K/UL — SIGNIFICANT CHANGE UP (ref 1.8–7.4)
NEUTROPHILS NFR BLD AUTO: 65.5 % — SIGNIFICANT CHANGE UP (ref 43–77)
NITRITE UR-MCNC: POSITIVE
NRBC # BLD: 0 /100 WBCS — SIGNIFICANT CHANGE UP
NRBC # FLD: 0 K/UL — SIGNIFICANT CHANGE UP
PH UR: 5.5 — SIGNIFICANT CHANGE UP (ref 5–8)
PHOSPHATE SERPL-MCNC: 4.2 MG/DL — SIGNIFICANT CHANGE UP (ref 2.5–4.5)
PLATELET # BLD AUTO: 421 K/UL — HIGH (ref 150–400)
POTASSIUM SERPL-MCNC: 3.6 MMOL/L — SIGNIFICANT CHANGE UP (ref 3.5–5.3)
POTASSIUM SERPL-SCNC: 3.6 MMOL/L — SIGNIFICANT CHANGE UP (ref 3.5–5.3)
PROT SERPL-MCNC: 7.6 G/DL — SIGNIFICANT CHANGE UP (ref 6–8.3)
PROT UR-MCNC: ABNORMAL
RBC # BLD: 4.38 M/UL — SIGNIFICANT CHANGE UP (ref 3.8–5.2)
RBC # FLD: 16.3 % — HIGH (ref 10.3–14.5)
RBC CASTS # UR COMP ASSIST: 3 /HPF — SIGNIFICANT CHANGE UP (ref 0–4)
SODIUM SERPL-SCNC: 138 MMOL/L — SIGNIFICANT CHANGE UP (ref 135–145)
SP GR SPEC: 1.01 — SIGNIFICANT CHANGE UP (ref 1.01–1.02)
UROBILINOGEN FLD QL: SIGNIFICANT CHANGE UP
WBC # BLD: 9.74 K/UL — SIGNIFICANT CHANGE UP (ref 3.8–10.5)
WBC # FLD AUTO: 9.74 K/UL — SIGNIFICANT CHANGE UP (ref 3.8–10.5)
WBC UR QL: 148 /HPF — HIGH (ref 0–5)

## 2021-03-15 RX ADMIN — Medication 1 APPLICATION(S): at 06:32

## 2021-03-15 RX ADMIN — ALBUTEROL 2 PUFF(S): 90 AEROSOL, METERED ORAL at 00:00

## 2021-03-15 RX ADMIN — CHOLESTYRAMINE 4 GRAM(S): 4 POWDER, FOR SUSPENSION ORAL at 10:35

## 2021-03-15 RX ADMIN — ALBUTEROL 2 PUFF(S): 90 AEROSOL, METERED ORAL at 18:55

## 2021-03-15 RX ADMIN — ALBUTEROL 2 PUFF(S): 90 AEROSOL, METERED ORAL at 06:30

## 2021-03-15 RX ADMIN — AMLODIPINE BESYLATE 5 MILLIGRAM(S): 2.5 TABLET ORAL at 06:31

## 2021-03-15 RX ADMIN — Medication 650 MILLIGRAM(S): at 19:00

## 2021-03-15 RX ADMIN — Medication 50 MILLIGRAM(S): at 18:54

## 2021-03-15 RX ADMIN — Medication 1 TABLET(S): at 13:53

## 2021-03-15 RX ADMIN — RIVAROXABAN 20 MILLIGRAM(S): KIT at 18:54

## 2021-03-15 RX ADMIN — NYSTATIN CREAM 1 APPLICATION(S): 100000 CREAM TOPICAL at 06:31

## 2021-03-15 RX ADMIN — Medication 1 TABLET(S): at 18:54

## 2021-03-15 RX ADMIN — Medication 0.5 MILLIGRAM(S): at 13:57

## 2021-03-15 RX ADMIN — Medication 50 MILLIGRAM(S): at 06:31

## 2021-03-15 RX ADMIN — ALBUTEROL 2 PUFF(S): 90 AEROSOL, METERED ORAL at 10:35

## 2021-03-15 RX ADMIN — ZINC SULFATE TAB 220 MG (50 MG ZINC EQUIVALENT) 220 MILLIGRAM(S): 220 (50 ZN) TAB at 13:53

## 2021-03-15 RX ADMIN — PANTOPRAZOLE SODIUM 40 MILLIGRAM(S): 20 TABLET, DELAYED RELEASE ORAL at 13:52

## 2021-03-15 RX ADMIN — CHOLESTYRAMINE 4 GRAM(S): 4 POWDER, FOR SUSPENSION ORAL at 22:24

## 2021-03-15 RX ADMIN — NYSTATIN CREAM 1 APPLICATION(S): 100000 CREAM TOPICAL at 13:53

## 2021-03-15 RX ADMIN — ALBUTEROL 2 PUFF(S): 90 AEROSOL, METERED ORAL at 22:24

## 2021-03-15 RX ADMIN — Medication 1 TABLET(S): at 06:31

## 2021-03-15 RX ADMIN — NYSTATIN CREAM 1 APPLICATION(S): 100000 CREAM TOPICAL at 22:24

## 2021-03-15 RX ADMIN — CHLORHEXIDINE GLUCONATE 1 APPLICATION(S): 213 SOLUTION TOPICAL at 13:57

## 2021-03-15 RX ADMIN — Medication 1 APPLICATION(S): at 18:54

## 2021-03-15 NOTE — PROGRESS NOTE ADULT - ASSESSMENT
Patient is a 61yo F patient being seen by private podiatry attending for the following:  - Elongated nails x10  - Pain in the right toes 1-5  - Pain in the left toes 1-5    Plan:  The patient was evaluated and medical records were reviewed.  The patient showed verbal understanding with regard to their evaluation as well as the treatment plan.  After thorough examination of patient, aseptic mechanical debridement of toenails x 10 was performed using sterile nail clippers. Alcohol prep was used to sterilize the toenails before and after debridement of nails.   Patient tolerated intervention well without any complications  I discussed the importance of daily foot examinations and proper shoe gear with the patient at length.    Patient may follow up in my office upon discharge (Dr. Waterhouse) - [phone]  Patient demonstrated verbal understanding of all interventions and all questions were answered appropriately.  No further interventions by Podiatry at this time.   Please re-consult as needed.  Thank you for the consultation.

## 2021-03-15 NOTE — CHART NOTE - NSCHARTNOTEFT_GEN_A_CORE
Source: Patient [X ]    Family [ ]     other [X ] RN, Review of the patient's medical chart     Current Diet : Diet, Dysphagia 2 Mechanical Soft-Thin Liquids (03-01-21 @ 13:46)    Reported:  [ ] nausea  [ ] vomiting [ ] diarrhea [ ] constipation  [ ]chewing problems [ ] swallowing issues  [ ] other:   PO intake:  < 50% [ ] 50-75% [ ]   % [X ]  other :    _____Current Weight and trends_____   Stated UBW 250lb/113.6kg  Admission weight 115kg 12/10, 115.4kg 12/17, 128.3kg 12/19, 126.4kg 12/30, 113kg 1/20, 111.5 2/7, 115kg 12/10, 109.2kg 2/16, 101kg 3/2, 88kg 3/12, 93.7kg 3/15 obtained via bed scale during encounter, reflects 3+ generalized edema.    Patient Hx:  Patient is a 60F with PMHx of HTN, DVT on Xarelto, Peritonitis s/p Oral's Procedure and Ileostomy (reversed in 2011) and AARON who presented to [facility] on 11/18 for AHRF second to COVID 19, intubated on 11/23 complicated by lung abscesses on CT CHEST 12/5, multi-bacterial PNA and bacteremia, ARTEMIO s/p CRRT and HD, and s/p Tracheostomy 12/18. Transferred to RCU for further management, now transferred to . Pt now decannulated and PEG removed, eating dysphagia diet, pending dc to acute rehab.     Interval Nutrition Hx:  RDN met with patient at bedside.  She reports good PO intake and appetite at this time.  No GI distress (nausea/vomiting/diarrhea/constipation.) No reported difficulties chewing and swallowing foods and fluids, she remains on  a mechanical soft diet with thin liquids per speech and swallow recommendations 3/1.  Pt states that she has been eating 100% protein items, but limiting intake of carbohydrates d/t inactivity. Pt aware of weight loss x 21.3kg (18.5%) on this admission, based on weight obtained 3/15 which is possibly further masked by fluid retention.  Food preferences obtained and implemented. Pt amenable to yogurt with breakfast and Ensure Enlive 240mls 1x daily (350 kcal, 20g protein).        __________________ Pertinent Medications__________________   ALBUTerol    90 MICROgram(s) HFA Inhaler  amLODIPine   Tablet  chlorhexidine 4% Liquid  cholestyramine Powder (Sugar-Free)  clonazePAM  Tablet  collagenase Ointment  dextrose 40% Gel  dextrose 5%.  dextrose 5%.  dextrose 5%.  dextrose 50% Injectable  dextrose 50% Injectable  dextrose 50% Injectable  glucagon  Injectable  insulin lispro (ADMELOG) corrective regimen sliding scale  lactobacillus acidophilus  metoprolol tartrate  multivitamin  nystatin Powder  pantoprazole  Injectable  rivaroxaban  zinc sulfate      __________________ Pertinent Labs__________________   03-15 Na138 mmol/L Glu 118 mg/dL<H> K+ 3.6 mmol/L Cr  0.38 mg/dL<L> BUN 9 mg/dL 03-15 Phos 4.2 mg/dL 03-15 Alb 3.3 g/dL          Estimated Needs:   [X ] no change since previous assessment  [ ] recalculated:     Previous Nutrition Diagnosis:     [ ] Inadequate Energy Intake [ ]Inadequate Oral Intake [ ] Excessive Energy Intake   [ ] Underweight [ ] Increased Nutrient Needs [ ] Overweight/Obesity   [ ] Altered GI Function [ ] Unintended Weight Loss [ ] Food & Nutrition Related Knowledge Deficit [x ] Malnutrition, SEVERE    Nutrition Diagnosis is [X ] ongoing  [ ] resolved [ ] not applicable     New Nutrition Diagnosis: [X ] not applicable        Nutrition Recommendations:  1- Continue current diet order, which remains appropriate at this time.   2- Monitor weights, labs, BM's, skin integrity, p.o. intake.   3- Multivitamin   4- Ensure Enlive 240mls 1x daily (350 kcal, 20g protein).   5- RD remains available, re-consult as needed. Raman Scales, MS, RDN Pager #[phone]

## 2021-03-15 NOTE — PROGRESS NOTE ADULT - SUBJECTIVE AND OBJECTIVE BOX
DATE OF SERVICE: 03-15-21 @ 13:38    Subjective: Patient seen and examined. No new events except as noted.   doing okay  afebrile     REVIEW OF SYSTEMS:    CONSTITUTIONAL: No weakness, fevers or chills  EYES/ENT: No visual changes;  No vertigo or throat pain   NECK: No pain or stiffness  RESPIRATORY: No cough, wheezing, hemoptysis; No shortness of breath  CARDIOVASCULAR: No chest pain or palpitations  GASTROINTESTINAL: No abdominal or epigastric pain. No nausea, vomiting, or hematemesis; No diarrhea or constipation. No melena or hematochezia.  GENITOURINARY: No dysuria, frequency or hematuria  NEUROLOGICAL: No numbness or weakness  SKIN: No itching, burning, rashes, or lesions   All other review of systems is negative unless indicated above.    MEDICATIONS:  MEDICATIONS  (STANDING):  ALBUTerol    90 MICROgram(s) HFA Inhaler 2 Puff(s) Inhalation every 6 hours  amLODIPine   Tablet 5 milliGRAM(s) Oral daily  chlorhexidine 4% Liquid 1 Application(s) Topical daily  cholestyramine Powder (Sugar-Free) 4 Gram(s) Oral two times a day  clonazePAM  Tablet 0.5 milliGRAM(s) Oral daily  collagenase Ointment 1 Application(s) Topical two times a day  dextrose 40% Gel 15 Gram(s) Oral once  dextrose 5%. 1000 milliLiter(s) (50 mL/Hr) IV Continuous <Continuous>  dextrose 5%. 1000 milliLiter(s) (100 mL/Hr) IV Continuous <Continuous>  dextrose 5%. 1000 milliLiter(s) (75 mL/Hr) IV Continuous <Continuous>  dextrose 50% Injectable 25 Gram(s) IV Push once  dextrose 50% Injectable 12.5 Gram(s) IV Push once  dextrose 50% Injectable 25 Gram(s) IV Push once  glucagon  Injectable 1 milliGRAM(s) IntraMuscular once  insulin lispro (ADMELOG) corrective regimen sliding scale   SubCutaneous Before meals and at bedtime  lactobacillus acidophilus 1 Tablet(s) Oral two times a day  metoprolol tartrate 50 milliGRAM(s) Oral two times a day  multivitamin 1 Tablet(s) Oral daily  nystatin Powder 1 Application(s) Topical every 8 hours  pantoprazole  Injectable 40 milliGRAM(s) IV Push daily  rivaroxaban 20 milliGRAM(s) Oral with dinner  zinc sulfate 220 milliGRAM(s) Oral daily      PHYSICAL EXAM:  T(C): 36.4 (03-15-21 @ 06:20), Max: 37 (21 @ 17:35)  HR: 94 (03-15-21 @ 06:20) (94 - 101)  BP: 137/60 (03-15-21 @ 06:20) (137/60 - 144/80)  RR: 18 (03-15-21 @ 06:20) (18 - 18)  SpO2: 97% (03-15-21 @ 06:20) (92% - 97%)  Wt(kg): --  I&O's Summary        Appearance: Normal	  HEENT:  PERRLA   Lymphatic: No lymphadenopathy   Cardiovascular: Normal S1 S2, no JVD  Respiratory: normal effort , clear  Gastrointestinal:  Soft, Non-tender  Skin: No rashes,  warm to touch  Psychiatry:  Mood & affect appropriate  Musculuskeletal: stable,. mild edema B/L Exts       All labs, Imaging and EKGs personally reviewed                           11.6   9.74  )-----------( 421      ( 15 Mar 2021 07:19 )             38.8               -15    138  |  104  |  9   ----------------------------<  118<H>  3.6   |  23  |  0.38<L>    Ca    10.1      15 Mar 2021 07:19  Phos  4.2     -15  Mg     1.8     03-15    TPro  7.6  /  Alb  3.3  /  TBili  0.5  /  DBili  x   /  AST  67<H>  /  ALT  88<H>  /  AlkPhos  235<H>  -15                       Urinalysis Basic - ( 14 Mar 2021 07:19 )    Color: Yellow / Appearance: Clear / S.016 / pH: x  Gluc: x / Ketone: Negative  / Bili: Negative / Urobili: <2 mg/dL   Blood: x / Protein: Trace / Nitrite: Positive   Leuk Esterase: Large / RBC: 2 /HPF / WBC 31 /HPF   Sq Epi: x / Non Sq Epi: 0 /HPF / Bacteria: Many

## 2021-03-15 NOTE — PROGRESS NOTE ADULT - SUBJECTIVE AND OBJECTIVE BOX
Patient is a 60y old  Female who presents with a chief complaint of Transfer from [facility] (15 Mar 2021 21:15)       INTERVAL HPI/OVERNIGHT EVENTS:  Patient seen and evaluated at bedside.  Pt is resting comfortable in NAD. Denies N/V/F/C.     Allergies    Kiwi (Unknown)  latex (Anaphylaxis)  latex (Unknown)  penicillin (Other)  penicillin (Unknown)  perfume  hives (Other)  potassium acetate (Other)  soap additives hives (Other)    Intolerances        Vital Signs Last 24 Hrs  T(C): 36.8 (15 Mar 2021 18:10), Max: 36.8 (15 Mar 2021 18:10)  T(F): 98.2 (15 Mar 2021 18:10), Max: 98.2 (15 Mar 2021 18:10)  HR: 95 (15 Mar 2021 18:10) (92 - 95)  BP: 141/63 (15 Mar 2021 18:10) (137/60 - 146/60)  BP(mean): --  RR: 18 (15 Mar 2021 18:10) (16 - 18)  SpO2: 98% (15 Mar 2021 18:10) (95% - 98%)    LABS:                        11.6   9.74  )-----------( 421      ( 15 Mar 2021 07:19 )             38.8     03-15    138  |  104  |  9   ----------------------------<  118<H>  3.6   |  23  |  0.38<L>    Ca    10.1      15 Mar 2021 07:19  Phos  4.2     03-15  Mg     1.8     03-15    TPro  7.6  /  Alb  3.3  /  TBili  0.5  /  DBili  x   /  AST  67<H>  /  ALT  88<H>  /  AlkPhos  235<H>  03-15      Urinalysis Basic - ( 14 Mar 2021 07:19 )    Color: Yellow / Appearance: Clear / S.016 / pH: x  Gluc: x / Ketone: Negative  / Bili: Negative / Urobili: <2 mg/dL   Blood: x / Protein: Trace / Nitrite: Positive   Leuk Esterase: Large / RBC: 2 /HPF / WBC 31 /HPF   Sq Epi: x / Non Sq Epi: 0 /HPF / Bacteria: Many      CAPILLARY BLOOD GLUCOSE      POCT Blood Glucose.: 133 mg/dL (15 Mar 2021 17:30)  POCT Blood Glucose.: 122 mg/dL (15 Mar 2021 12:48)  POCT Blood Glucose.: 128 mg/dL (15 Mar 2021 08:57)      Lower Extremity Physical Exam:  Vascular: DP/PT 2/4, B/L, CFT <3 seconds B/L, Temperature gradient WNL, B/L.   Neuro: Epicritic sensation intact to the level of digits, B/L.  Musculoskeletal/Ortho: no contractures  Skin: mild skin slough, elongated nails x10 slight curvature no paronychia noted x10, otherwise no open lesion no acute signs of infection    RADIOLOGY & ADDITIONAL TESTS:

## 2021-03-15 NOTE — PROGRESS NOTE ADULT - ASSESSMENT
60 year old female with prolonged hospital course secondary to COVID 19, GI consulted for persistent diarrhea     Problem/Recommendation - 1:  Problem: COVID-19. Recommendation: status post treatment by infectious disease  now off of isolation.     Problem/Recommendation - 2:  ·  Problem: Leukocytosis: unclear etiology. Improved to 9 from 17 yesterday. No diarrhea, doubt c-diff  -trend WBC  -observe for any focal symptoms     Problem/Recommendation - 3:  ·  Problem: Diarrhea.  Recommendation: now improved, was most likely due to tube feeds. Now tolerating regular food.  -lomotil PRN     Problem/Recommendation - 4:  ·  Problem: Hernia, incisional.  Recommendation: reducible, nontender. No signs of incarceration.   -serial abdominal exams.      Problem/Recommendation - 5:  ·  Problem: Gastrostomy present.  Recommendation: patient tolerating good PO intake. Passed swallow eval. Gastrostomy removed at bedside and bandaged.     Problem/Recommendation - 6:  Problem: ARTEMIO (acute kidney injury). Recommendation: s/p CVVHD, now improved.     Problem/Recommendation - 7:  Problem: History of pressure ulcer. Recommendation: per RCU team.     Problem/Recommendation - 8:  Problem: Clostridium difficile infection. Recommendation: status post 2 weeks of PO vanco  -vanco per ID on hold.     Problem/Recommendation - 9:  Problem: Abnormal LFTs, ALK phos predominant, most likely delayed presentation of mild "COVID cholangiopathy". a  -periodic LFT measurements.    Attending Attestation:   Differential diagnosis and plan of care discussed with patient after the evaluation  65 Minutes spent on total encounter of which more than fifty percent of the encounter was spent counseling and/or coordinating care by the attending physician.      Ranjeet Ponce M.D.   Gastroenterology and Hepatology  Cell: [phone].

## 2021-03-15 NOTE — PROGRESS NOTE ADULT - SUBJECTIVE AND OBJECTIVE BOX
Chief Complaint:  Patient is a 60y old  Female who presents with a chief complaint of Transfer from [facility] (15 Mar 2021 13:38)      Interval Events:   no abd pain  no diarrhea    Allergies:  Kiwi (Unknown)  latex (Anaphylaxis)  latex (Unknown)  penicillin (Other)  penicillin (Unknown)  perfume  hives (Other)  potassium acetate (Other)  soap additives hives (Other)      Hospital Medications:  acetaminophen    Suspension .. 650 milliGRAM(s) Oral every 6 hours PRN  ALBUTerol    90 MICROgram(s) HFA Inhaler 2 Puff(s) Inhalation every 6 hours  amLODIPine   Tablet 5 milliGRAM(s) Oral daily  artificial tears (preservative free) Ophthalmic Solution 1 Drop(s) Both EYES every 8 hours PRN  chlorhexidine 4% Liquid 1 Application(s) Topical daily  cholestyramine Powder (Sugar-Free) 4 Gram(s) Oral two times a day  clonazePAM  Tablet 0.5 milliGRAM(s) Oral daily  collagenase Ointment 1 Application(s) Topical two times a day  dextrose 40% Gel 15 Gram(s) Oral once  dextrose 5%. 1000 milliLiter(s) IV Continuous <Continuous>  dextrose 5%. 1000 milliLiter(s) IV Continuous <Continuous>  dextrose 5%. 1000 milliLiter(s) IV Continuous <Continuous>  dextrose 50% Injectable 25 Gram(s) IV Push once  dextrose 50% Injectable 12.5 Gram(s) IV Push once  dextrose 50% Injectable 25 Gram(s) IV Push once  diphenoxylate/atropine 1 Tablet(s) Oral four times a day PRN  glucagon  Injectable 1 milliGRAM(s) IntraMuscular once  guaiFENesin   Syrup  (Sugar-Free) 100 milliGRAM(s) Oral every 6 hours PRN  insulin lispro (ADMELOG) corrective regimen sliding scale   SubCutaneous Before meals and at bedtime  lactobacillus acidophilus 1 Tablet(s) Oral two times a day  metoprolol tartrate 50 milliGRAM(s) Oral two times a day  multivitamin 1 Tablet(s) Oral daily  nystatin Powder 1 Application(s) Topical every 8 hours  pantoprazole  Injectable 40 milliGRAM(s) IV Push daily  rivaroxaban 20 milliGRAM(s) Oral with dinner  zinc sulfate 220 milliGRAM(s) Oral daily      PMHX/PSHX:  Pneumomediastinum    Umbilical hernia    Osteoarthritis of both knees, unspecified osteoarthritis type    AARON (Obstructive Sleep Apnea)    Pneumonia    S/P Colon Resection    pt with allergies to perfumes and addidtive to soaps, requires hypoallergenic sheets and gowns, line    Cervical Dysplasia    Latex Sensitivity    GERD (Gastroesophageal Reflux Disease)    Asthma    Anemia    S/P Joint Replacement    DVT (Deep Venous Thrombosis)    HTN (Hypertension)    H/O ileostomy    S/P LEEP    S/P Exploratory Laparotomy    S/P Colonoscopy    S/P Endoscopy    S/P  Section    History of Tubal Ligation    S/P Knee Surgery    Umbilical Hernia    Osteoarthritis of Knee    HTN (Hypertension)        Family history:  Family history of gastric cancer    Family history of breast cancer (Grandparent)        ROS:     General:  No wt loss, fevers, chills, night sweats, fatigue,   Eyes:  Good vision, no reported pain  ENT:  No sore throat, pain, runny nose, dysphagia  CV:  No pain, palpitations, hypo/hypertension  Resp:  No dyspnea, cough, tachypnea, wheezing  GI:  See HPI  :  No pain, bleeding, incontinence, nocturia  Muscle:  No pain, weakness  Neuro:  No weakness, tingling, memory problems  Psych:  No fatigue, insomnia, mood problems, depression  Endocrine:  No polyuria, polydipsia, cold/heat intolerance  Heme:  No petechiae, ecchymosis, easy bruisability  Skin:  No rash, edema      PHYSICAL EXAM:     GENERAL:  Appears stated age, well-groomed, well-nourished, no distress  HEENT:  NC/AT,  conjunctivae clear, sclera-anicteric  NECK: Trachea midline, supple  CHEST:  Full & symmetric excursion, no increased effort, breath sounds clear  HEART:  Regular rhythm, no lola/heave  ABDOMEN:  Soft, non-tender, non-distended, normoactive bowel sounds,  no masses ,no hepato-splenomegaly, gastrostomy site healed  EXTREMITIES:  no cyanosis,clubbing or edema, LUE contracted  SKIN:  No rash/erythema/petechiae, no jaundice  NEURO:  Alert, oriented, no asterixis  RECTAL: Deferred    Vital Signs:  Vital Signs Last 24 Hrs  T(C): 36.8 (15 Mar 2021 18:10), Max: 36.8 (15 Mar 2021 18:10)  T(F): 98.2 (15 Mar 2021 18:10), Max: 98.2 (15 Mar 2021 18:10)  HR: 95 (15 Mar 2021 18:10) (92 - 95)  BP: 141/63 (15 Mar 2021 18:10) (137/60 - 146/60)  BP(mean): --  RR: 18 (15 Mar 2021 18:10) (16 - 18)  SpO2: 98% (15 Mar 2021 18:10) (95% - 98%)  Daily     Daily     LABS:                        11.6   9.74  )-----------( 421      ( 15 Mar 2021 07:19 )             38.8     03-15    138  |  104  |  9   ----------------------------<  118<H>  3.6   |  23  |  0.38<L>    Ca    10.1      15 Mar 2021 07:19  Phos  4.2     -15  Mg     1.8     03-15    TPro  7.6  /  Alb  3.3  /  TBili  0.5  /  DBili  x   /  AST  67<H>  /  ALT  88<H>  /  AlkPhos  235<H>  15    LIVER FUNCTIONS - ( 15 Mar 2021 07:19 )  Alb: 3.3 g/dL / Pro: 7.6 g/dL / ALK PHOS: 235 U/L / ALT: 88 U/L / AST: 67 U/L / GGT: x             Urinalysis Basic - ( 14 Mar 2021 07:19 )    Color: Yellow / Appearance: Clear / S.016 / pH: x  Gluc: x / Ketone: Negative  / Bili: Negative / Urobili: <2 mg/dL   Blood: x / Protein: Trace / Nitrite: Positive   Leuk Esterase: Large / RBC: 2 /HPF / WBC 31 /HPF   Sq Epi: x / Non Sq Epi: 0 /HPF / Bacteria: Many          Imaging:

## 2021-03-15 NOTE — PROGRESS NOTE ADULT - ASSESSMENT
61 YO F with PMHx of HTN, DVT on Xarelto, Peritonitis s/p Oral's Procedure and Ileostomy (reversed in 2011) and AARON who presented to [facility] on 11/18 for AHRF second to COVID 19, intubated on 11/23 complicated by lung abscesses on CT CHEST 12/5, multi-bacterial PNA and bacteremia, ARTEMIO s/p CRRT and HD, and s/p Tracheostomy 12/18. Transferred to RCU for further management, now transferred to . Pt now decannulated and PEG removed, eating dysphagia diet, pending dc to acute rehab.     # LEukocytosis   trending down   infectious work up negative to date, UA positive, asymptomatic   pan Cx  CXR   CT Chest noted   hold antibiotics  if febrile, obtain new set on cultures and start vanc and zosyn for broad coverage   podiatry eval called     # CDIFF (+)   - s/p PO Vancomycin 2/1-2/25  - Stool O&P and GI PCR NGTD, c/w Lomotil QID   - GI eval appreciated   -stool more formed , no further diarrhea   - pending D/C to acute rehab, on hold due to repeat covid positive   - Isolation and quarantine per protocol , D/c Planing to rehab       # ARDS second to COVID vs Multi-bacterial PNA/ R Lung Abscess  - s/p Intubated on 11/23 and c/b multi-bacterial PNA and lung abscesses.   - s/p Tracheostomy on 12/18 by CTSx. Sutures out 1/1  - s/p Air leak noted and s/p Bivona with CTSx 12/31. s/p Downsized to 6DCT 2/25  - s/p Successfully decannulated on 3/1, Now on 2LNC saturating well  - Proventil and Chest PT q6hr PRN      # ARTEMIO s/p CRRT and HD   - ARTEMIO now resolved and no longer required HD.   - Hypokalemia in setting of diarrhea/ CDIFF. Monitor electrolytes.   - Monitor renal function and avoid nephrotoxins.       # Sepsis second to COVID vs Multi-bacterial PNA/ Bacteremia and R Lung Abscess vs CDIFF (+)   - COVID with superimposed multi-bacterial VAP vs aspiration PNA vs cavitary PNA.   - SCx (11/24) MSSA and BCx (11/27) with MSSA and Proteus Bacteremia  - SCx (12/1, 12/12 and 12/27) with Stenotrophomonas.   - SCx (1/9) with  Pseudomonas and Stenotrophomonas   - Bcx has been persistently negative from 12/1, and most recently 1/12   - CT CHEST 12/4 with right lung cavitation.   - s/p multiple antibiotics, but now completed Fortaz and Flagyl (1/12-1/16)  - RPT CT CHEST 1/28 with improvement of right lung abscess   - SCx with  Pseudomonas and Stenotrophomonas and s/p TED/ Fortaz (1/28-2/6)      # DMII   - Continue on ISS   - FS well controlled      # Hx of DVT   - c/w Xarelto      # Contracted Wrists   - PT/OT working on strength   OMT at the bedside       # CODE STATUS - FULL CODE     # DISPO - PM&R recs ACUTE REHAB

## 2021-03-16 PROCEDURE — 99232 SBSQ HOSP IP/OBS MODERATE 35: CPT

## 2021-03-16 RX ADMIN — PANTOPRAZOLE SODIUM 40 MILLIGRAM(S): 20 TABLET, DELAYED RELEASE ORAL at 14:06

## 2021-03-16 RX ADMIN — ALBUTEROL 2 PUFF(S): 90 AEROSOL, METERED ORAL at 09:39

## 2021-03-16 RX ADMIN — ZINC SULFATE TAB 220 MG (50 MG ZINC EQUIVALENT) 220 MILLIGRAM(S): 220 (50 ZN) TAB at 14:06

## 2021-03-16 RX ADMIN — RIVAROXABAN 20 MILLIGRAM(S): KIT at 19:01

## 2021-03-16 RX ADMIN — ALBUTEROL 2 PUFF(S): 90 AEROSOL, METERED ORAL at 18:00

## 2021-03-16 RX ADMIN — Medication 650 MILLIGRAM(S): at 23:26

## 2021-03-16 RX ADMIN — ALBUTEROL 2 PUFF(S): 90 AEROSOL, METERED ORAL at 22:25

## 2021-03-16 RX ADMIN — Medication 50 MILLIGRAM(S): at 19:01

## 2021-03-16 RX ADMIN — Medication 1 TABLET(S): at 19:01

## 2021-03-16 RX ADMIN — CHLORHEXIDINE GLUCONATE 1 APPLICATION(S): 213 SOLUTION TOPICAL at 14:07

## 2021-03-16 RX ADMIN — Medication 650 MILLIGRAM(S): at 22:26

## 2021-03-16 RX ADMIN — Medication 50 MILLIGRAM(S): at 05:41

## 2021-03-16 RX ADMIN — AMLODIPINE BESYLATE 5 MILLIGRAM(S): 2.5 TABLET ORAL at 05:41

## 2021-03-16 RX ADMIN — CHOLESTYRAMINE 4 GRAM(S): 4 POWDER, FOR SUSPENSION ORAL at 22:26

## 2021-03-16 RX ADMIN — Medication 1 APPLICATION(S): at 05:41

## 2021-03-16 RX ADMIN — CHOLESTYRAMINE 4 GRAM(S): 4 POWDER, FOR SUSPENSION ORAL at 09:39

## 2021-03-16 RX ADMIN — NYSTATIN CREAM 1 APPLICATION(S): 100000 CREAM TOPICAL at 14:07

## 2021-03-16 RX ADMIN — Medication 1 APPLICATION(S): at 19:01

## 2021-03-16 RX ADMIN — NYSTATIN CREAM 1 APPLICATION(S): 100000 CREAM TOPICAL at 05:42

## 2021-03-16 RX ADMIN — ALBUTEROL 2 PUFF(S): 90 AEROSOL, METERED ORAL at 05:41

## 2021-03-16 RX ADMIN — Medication 0.5 MILLIGRAM(S): at 14:07

## 2021-03-16 RX ADMIN — NYSTATIN CREAM 1 APPLICATION(S): 100000 CREAM TOPICAL at 22:26

## 2021-03-16 RX ADMIN — Medication 1 TABLET(S): at 05:41

## 2021-03-16 NOTE — PROGRESS NOTE ADULT - SUBJECTIVE AND OBJECTIVE BOX
DATE OF SERVICE: 21     Subjective: Patient seen and examined. No new events except as noted.   doing okay   D/ C planing to rehab after covid swab     REVIEW OF SYSTEMS:    CONSTITUTIONAL: No weakness, fevers or chills  EYES/ENT: No visual changes;  No vertigo or throat pain   NECK: No pain or stiffness  RESPIRATORY: No cough, wheezing, hemoptysis; No shortness of breath  CARDIOVASCULAR: No chest pain or palpitations  GASTROINTESTINAL: No abdominal or epigastric pain. No nausea, vomiting, or hematemesis; No diarrhea or constipation. No melena or hematochezia.  GENITOURINARY: No dysuria, frequency or hematuria  NEUROLOGICAL: No numbness or weakness  SKIN: No itching, burning, rashes, or lesions   All other review of systems is negative unless indicated above.    MEDICATIONS:  MEDICATIONS  (STANDING):  ALBUTerol    90 MICROgram(s) HFA Inhaler 2 Puff(s) Inhalation every 6 hours  amLODIPine   Tablet 5 milliGRAM(s) Oral daily  chlorhexidine 4% Liquid 1 Application(s) Topical daily  cholestyramine Powder (Sugar-Free) 4 Gram(s) Oral two times a day  clonazePAM  Tablet 0.5 milliGRAM(s) Oral daily  collagenase Ointment 1 Application(s) Topical two times a day  dextrose 40% Gel 15 Gram(s) Oral once  dextrose 5%. 1000 milliLiter(s) (50 mL/Hr) IV Continuous <Continuous>  dextrose 5%. 1000 milliLiter(s) (100 mL/Hr) IV Continuous <Continuous>  dextrose 5%. 1000 milliLiter(s) (75 mL/Hr) IV Continuous <Continuous>  dextrose 50% Injectable 25 Gram(s) IV Push once  dextrose 50% Injectable 12.5 Gram(s) IV Push once  dextrose 50% Injectable 25 Gram(s) IV Push once  glucagon  Injectable 1 milliGRAM(s) IntraMuscular once  insulin lispro (ADMELOG) corrective regimen sliding scale   SubCutaneous Before meals and at bedtime  lactobacillus acidophilus 1 Tablet(s) Oral two times a day  metoprolol tartrate 50 milliGRAM(s) Oral two times a day  multivitamin 1 Tablet(s) Oral daily  nystatin Powder 1 Application(s) Topical every 8 hours  pantoprazole  Injectable 40 milliGRAM(s) IV Push daily  rivaroxaban 20 milliGRAM(s) Oral with dinner  zinc sulfate 220 milliGRAM(s) Oral daily      PHYSICAL EXAM:  T(C): 36.9 (21 @ 05:38), Max: 36.9 (03-15-21 @ 22:19)  HR: 87 (21 @ 05:38) (80 - 87)  BP: 138/62 (21 @ 05:38) (138/62 - 145/60)  RR: 18 (21 @ 05:38) (18 - 18)  SpO2: 96% (21 @ 05:38) (95% - 96%)  Wt(kg): --  I&O's Summary        Appearance: Normal	  HEENT:  PERRLA   Lymphatic: No lymphadenopathy   Cardiovascular: Normal S1 S2  Respiratory: normal effort , clear  Gastrointestinal:  Soft, Non-tender  Skin: No rashes,  warm to touch  Psychiatry:  Mood & affect appropriate  Musculuskeletal: B/L edema       All labs, Imaging and EKGs personally reviewed                         11.6   9.74  )-----------( 421      ( 15 Mar 2021 07:19 )             38.8               03-15    138  |  104  |  9   ----------------------------<  118<H>  3.6   |  23  |  0.38<L>    Ca    10.1      15 Mar 2021 07:19  Phos  4.2     03-15  Mg     1.8     03-15    TPro  7.6  /  Alb  3.3  /  TBili  0.5  /  DBili  x   /  AST  67<H>  /  ALT  88<H>  /  AlkPhos  235<H>  03-15                       Urinalysis Basic - ( 15 Mar 2021 22:48 )    Color: Yellow / Appearance: Slightly Turbid / S.014 / pH: x  Gluc: x / Ketone: Negative  / Bili: Negative / Urobili: <2 mg/dL   Blood: x / Protein: Trace / Nitrite: Positive   Leuk Esterase: Large / RBC: 3 /HPF /  /HPF   Sq Epi: x / Non Sq Epi: 0 /HPF / Bacteria: Many

## 2021-03-16 NOTE — PROGRESS NOTE ADULT - ASSESSMENT
60 year old female with prolonged hospital course secondary to COVID 19, GI consulted for persistent diarrhea     Problem/Recommendation - 1:  Problem: COVID-19. Recommendation: status post treatment by infectious disease  now off of isolation.     Problem/Recommendation - 2:  ·  Problem: Leukocytosis: unclear etiology. Improved to 9 from 17 yesterday. No diarrhea, doubt c-diff  -trend WBC  -observe for any focal symptoms     Problem/Recommendation - 3:  ·  Problem: Diarrhea.  Recommendation: now improved, was most likely due to tube feeds. Now tolerating regular food.  -lomotil PRN     Problem/Recommendation - 4:  ·  Problem: Hernia, incisional.  Recommendation: reducible, nontender. No signs of incarceration.   -serial abdominal exams.      Problem/Recommendation - 5:  ·  Problem: Gastrostomy present.  Recommendation: patient tolerating good PO intake. Passed swallow eval. Gastrostomy removed at bedside and bandaged.     Problem/Recommendation - 6:  Problem: ARTEMIO (acute kidney injury). Recommendation: s/p CVVHD, now improved.     Problem/Recommendation - 7:  Problem: History of pressure ulcer. Recommendation: per RCU team.     Problem/Recommendation - 8:  Problem: Clostridium difficile infection. Recommendation: status post 2 weeks of PO vanco  -vanco per ID on hold.     Problem/Recommendation - 9:  Problem: Abnormal LFTs, ALK phos predominant, most likely delayed presentation of mild "COVID cholangiopathy". abdominal ultrasound showed hepatic steatosis, no biliary pathology  -periodic LFT measurements.    Attending Attestation:   Differential diagnosis and plan of care discussed with patient after the evaluation  65 Minutes spent on total encounter of which more than fifty percent of the encounter was spent counseling and/or coordinating care by the attending physician.      Ranjeet Ponce M.D.   Gastroenterology and Hepatology  Cell: [phone].

## 2021-03-16 NOTE — PROGRESS NOTE ADULT - SUBJECTIVE AND OBJECTIVE BOX
Chief Complaint:  Patient is a 60y old  Female who presents with a chief complaint of Transfer from [facility] (16 Mar 2021 18:37)      Interval Events:   no abd pain, no diarrhea  Allergies:  Kiwi (Unknown)  latex (Anaphylaxis)  latex (Unknown)  penicillin (Other)  penicillin (Unknown)  perfume  hives (Other)  potassium acetate (Other)  soap additives hives (Other)      Hospital Medications:  acetaminophen    Suspension .. 650 milliGRAM(s) Oral every 6 hours PRN  ALBUTerol    90 MICROgram(s) HFA Inhaler 2 Puff(s) Inhalation every 6 hours  amLODIPine   Tablet 5 milliGRAM(s) Oral daily  artificial tears (preservative free) Ophthalmic Solution 1 Drop(s) Both EYES every 8 hours PRN  chlorhexidine 4% Liquid 1 Application(s) Topical daily  cholestyramine Powder (Sugar-Free) 4 Gram(s) Oral two times a day  clonazePAM  Tablet 0.5 milliGRAM(s) Oral daily  collagenase Ointment 1 Application(s) Topical two times a day  dextrose 40% Gel 15 Gram(s) Oral once  dextrose 5%. 1000 milliLiter(s) IV Continuous <Continuous>  dextrose 5%. 1000 milliLiter(s) IV Continuous <Continuous>  dextrose 5%. 1000 milliLiter(s) IV Continuous <Continuous>  dextrose 50% Injectable 25 Gram(s) IV Push once  dextrose 50% Injectable 12.5 Gram(s) IV Push once  dextrose 50% Injectable 25 Gram(s) IV Push once  diphenoxylate/atropine 1 Tablet(s) Oral four times a day PRN  glucagon  Injectable 1 milliGRAM(s) IntraMuscular once  guaiFENesin   Syrup  (Sugar-Free) 100 milliGRAM(s) Oral every 6 hours PRN  insulin lispro (ADMELOG) corrective regimen sliding scale   SubCutaneous Before meals and at bedtime  lactobacillus acidophilus 1 Tablet(s) Oral two times a day  metoprolol tartrate 50 milliGRAM(s) Oral two times a day  multivitamin 1 Tablet(s) Oral daily  nystatin Powder 1 Application(s) Topical every 8 hours  pantoprazole  Injectable 40 milliGRAM(s) IV Push daily  rivaroxaban 20 milliGRAM(s) Oral with dinner  zinc sulfate 220 milliGRAM(s) Oral daily      PMHX/PSHX:  Pneumomediastinum    Umbilical hernia    Osteoarthritis of both knees, unspecified osteoarthritis type    AARON (Obstructive Sleep Apnea)    Pneumonia    S/P Colon Resection    pt with allergies to perfumes and addidtive to soaps, requires hypoallergenic sheets and gowns, line    Cervical Dysplasia    Latex Sensitivity    GERD (Gastroesophageal Reflux Disease)    Asthma    Anemia    S/P Joint Replacement    DVT (Deep Venous Thrombosis)    HTN (Hypertension)    H/O ileostomy    S/P LEEP    S/P Exploratory Laparotomy    S/P Colonoscopy    S/P Endoscopy    S/P  Section    History of Tubal Ligation    S/P Knee Surgery    Umbilical Hernia    Osteoarthritis of Knee    HTN (Hypertension)        Family history:  Family history of gastric cancer    Family history of breast cancer (Grandparent)        ROS:     General:  No wt loss, fevers, chills, night sweats, fatigue,   Eyes:  Good vision, no reported pain  ENT:  No sore throat, pain, runny nose, dysphagia  CV:  No pain, palpitations, hypo/hypertension  Resp:  No dyspnea, cough, tachypnea, wheezing  GI:  See HPI  :  No pain, bleeding, incontinence, nocturia  Muscle:  No pain, weakness  Neuro:  + weakness, no tingling, memory problems  Psych:  No fatigue, insomnia, mood problems, depression  Endocrine:  No polyuria, polydipsia, cold/heat intolerance  Heme:  No petechiae, ecchymosis, easy bruisability  Skin:  No rash, edema      PHYSICAL EXAM:     GENERAL:  Appears stated age, well-groomed, well-nourished, no distress  HEENT:  NC/AT,  conjunctivae clear, sclera-anicteric  NECK: Trachea midline, supple  CHEST:  Full & symmetric excursion, no increased effort, breath sounds clear  HEART:  Regular rhythm, no lola/heave  ABDOMEN:  Soft, non-tender, non-distended, normoactive bowel sounds,  no masses ,no hepato-splenomegaly, gastrostomy site closed  EXTREMITIES:  no cyanosis,clubbing or edema  SKIN:  No rash/erythema/petechiae, no jaundice  NEURO:  Alert, oriented, no asterixis  RECTAL: Deferred    Vital Signs:  Vital Signs Last 24 Hrs  T(C): 36.7 (16 Mar 2021 14:00), Max: 36.9 (15 Mar 2021 22:19)  T(F): 98.1 (16 Mar 2021 14:00), Max: 98.4 (15 Mar 2021 22:19)  HR: 98 (16 Mar 2021 19:00) (77 - 98)  BP: 145/69 (16 Mar 2021 19:00) (132/61 - 145/69)  BP(mean): --  RR: 17 (16 Mar 2021 14:00) (17 - 18)  SpO2: 98% (16 Mar 2021 14:00) (95% - 98%)  Daily     Daily     LABS:                        11.6   9.74  )-----------( 421      ( 15 Mar 2021 07:19 )             38.8     -15    138  |  104  |  9   ----------------------------<  118<H>  3.6   |  23  |  0.38<L>    Ca    10.1      15 Mar 2021 07:19  Phos  4.2     -15  Mg     1.8     03-15    TPro  7.6  /  Alb  3.3  /  TBili  0.5  /  DBili  x   /  AST  67<H>  /  ALT  88<H>  /  AlkPhos  235<H>  15    LIVER FUNCTIONS - ( 15 Mar 2021 07:19 )  Alb: 3.3 g/dL / Pro: 7.6 g/dL / ALK PHOS: 235 U/L / ALT: 88 U/L / AST: 67 U/L / GGT: x             Urinalysis Basic - ( 15 Mar 2021 22:48 )    Color: Yellow / Appearance: Slightly Turbid / S.014 / pH: x  Gluc: x / Ketone: Negative  / Bili: Negative / Urobili: <2 mg/dL   Blood: x / Protein: Trace / Nitrite: Positive   Leuk Esterase: Large / RBC: 3 /HPF /  /HPF   Sq Epi: x / Non Sq Epi: 0 /HPF / Bacteria: Many          Imaging personally reviewed:  abd US 3/13: hepatic steatosis, no gallstones

## 2021-03-16 NOTE — PROGRESS NOTE ADULT - SUBJECTIVE AND OBJECTIVE BOX
Patient is a 60y old  Female who presents with a chief complaint of Transfer from [facility] (15 Mar 2021 21:28)      HPI:  Patient is a 61yo F w/ AARON, peritonitis s/p Oral's procedure and ileostomy (reversed ), HTN, prior DVT/PE, ?Asthma admitted on 2020 to [facility] after presenting for  productive cough  w/ SOB x9 days and diarrhea x7 days and fever x1 day. Patient was found to be COVID + on 2020 and completed remdesivir -. Patient was intubated for airway protection on 2020. Patient has required proning (last 2020). Hospital course c/b by ATN, requiring CVVHD now on intermittent HD, unresponsive to bumex challenge last HD . Course also c/b Stenotrophomonas (, completed Levaquin x7 days), MSSA/P. mirablis bacteremia (on Cefazolin, potentially due to urine vs line-associated and MSSA in sputum due to PNA), also with lung abscesses on CT chest on .  Patient currently on Volume AC 30/350/8/35% on Precedex. Patient intermittently opens eyes but is not yet responsive. Transferred over to [facility] on 12/10 to offload [facility] COVID ICU    (10 Dec 2020 14:13)    denies new complaint. seen by podiatry today. states she wants to go to rehab.   also seen by GI due to wbc, patient had diarrhea which is now improved, most likely from tube feeds. Peg since removed. tolerating regular diet. On lomotil prn. pt reports regular bm.   UA + however being monitored off abx for now. leukocytosis improved.    REVIEW OF SYSTEMS  Constitutional - No fever, No weight loss, No fatigue  HEENT - No eye pain, No visual disturbances, No difficulty hearing, No tinnitus, No vertigo, No neck pain  Respiratory - No cough, No wheezing, No shortness of breath  Cardiovascular - No chest pain, No palpitations  Gastrointestinal - No abdominal pain, No nausea, No vomiting, No diarrhea, No constipation  Genitourinary - No dysuria, No frequency, No hematuria, No incontinence  Neurological - No headaches, No memory loss, + loss of strength, No numbness, No tremors  Skin - No itching, No rashes, No lesions   Endocrine - No temperature intolerance  Musculoskeletal - No joint pain, No joint swelling, No muscle pain  Psychiatric - No depression, No anxiety    PAST MEDICAL & SURGICAL HISTORY  Pneumomediastinum    Umbilical hernia    Osteoarthritis of both knees, unspecified osteoarthritis type    AARON (Obstructive Sleep Apnea)    Pneumonia    S/P Colon Resection    pt with allergies to perfumes and addidtive to soaps, requires hypoallergenic sheets and gowns, line    Cervical Dysplasia    Latex Sensitivity    GERD (Gastroesophageal Reflux Disease)    Asthma    Anemia    S/P Joint Replacement    DVT (Deep Venous Thrombosis)    HTN (Hypertension)    H/O ileostomy    S/P LEEP    S/P Exploratory Laparotomy    S/P Colonoscopy    S/P Endoscopy    S/P  Section    History of Tubal Ligation    S/P Knee Surgery    Umbilical Hernia    Osteoarthritis of Knee    HTN (Hypertension)       CURRENT FUNCTIONAL STATUS  3/15: Bed Mobility  Bed Mobility Training Sit-to-Supine: maximum assist (25% patient effort);  1 person assist;  verbal cues  Bed Mobility Training Supine-to-Sit: maximum assist (25% patient effort);  1 person assist;  verbal cues  Bed Mobility Training Limitations: decreased strength        FAMILY HISTORY   Family history of gastric cancer    Family history of breast cancer (Grandparent)        RECENT LABS/IMAGING  CBC Full  -  ( 15 Mar 2021 07:19 )  WBC Count : 9.74 K/uL  RBC Count : 4.38 M/uL  Hemoglobin : 11.6 g/dL  Hematocrit : 38.8 %  Platelet Count - Automated : 421 K/uL  Mean Cell Volume : 88.6 fL  Mean Cell Hemoglobin : 26.5 pg  Mean Cell Hemoglobin Concentration : 29.9 gm/dL  Auto Neutrophil # : 6.37 K/uL  Auto Lymphocyte # : 2.17 K/uL  Auto Monocyte # : 0.49 K/uL  Auto Eosinophil # : 0.56 K/uL  Auto Basophil # : 0.07 K/uL  Auto Neutrophil % : 65.5 %  Auto Lymphocyte % : 22.3 %  Auto Monocyte % : 5.0 %  Auto Eosinophil % : 5.7 %  Auto Basophil % : 0.7 %    -15    138  |  104  |  9   ----------------------------<  118<H>  3.6   |  23  |  0.38<L>    Ca    10.1      15 Mar 2021 07:19  Phos  4.2     03-15  Mg     1.8     03-15    TPro  7.6  /  Alb  3.3  /  TBili  0.5  /  DBili  x   /  AST  67<H>  /  ALT  88<H>  /  AlkPhos  235<H>  03-15    Urinalysis Basic - ( 15 Mar 2021 22:48 )    Color: Yellow / Appearance: Slightly Turbid / S.014 / pH: x  Gluc: x / Ketone: Negative  / Bili: Negative / Urobili: <2 mg/dL   Blood: x / Protein: Trace / Nitrite: Positive   Leuk Esterase: Large / RBC: 3 /HPF /  /HPF   Sq Epi: x / Non Sq Epi: 0 /HPF / Bacteria: Many        VITALS  T(C): 36.9 (21 @ 05:38), Max: 36.9 (03-15-21 @ 22:19)  HR: 87 (21 @ 05:38) (80 - 95)  BP: 138/62 (21 @ 05:38) (138/62 - 145/60)  RR: 18 (21 @ 05:38) (18 - 18)  SpO2: 96% (21 @ 05:38) (95% - 98%)  Wt(kg): --    ALLERGIES  Kiwi (Unknown)  latex (Anaphylaxis)  latex (Unknown)  penicillin (Other)  penicillin (Unknown)  perfume  hives (Other)  potassium acetate (Other)  soap additives hives (Other)      MEDICATIONS   acetaminophen    Suspension .. 650 milliGRAM(s) Oral every 6 hours PRN  ALBUTerol    90 MICROgram(s) HFA Inhaler 2 Puff(s) Inhalation every 6 hours  amLODIPine   Tablet 5 milliGRAM(s) Oral daily  artificial tears (preservative free) Ophthalmic Solution 1 Drop(s) Both EYES every 8 hours PRN  chlorhexidine 4% Liquid 1 Application(s) Topical daily  cholestyramine Powder (Sugar-Free) 4 Gram(s) Oral two times a day  clonazePAM  Tablet 0.5 milliGRAM(s) Oral daily  collagenase Ointment 1 Application(s) Topical two times a day  dextrose 40% Gel 15 Gram(s) Oral once  dextrose 5%. 1000 milliLiter(s) IV Continuous <Continuous>  dextrose 5%. 1000 milliLiter(s) IV Continuous <Continuous>  dextrose 5%. 1000 milliLiter(s) IV Continuous <Continuous>  dextrose 50% Injectable 25 Gram(s) IV Push once  dextrose 50% Injectable 12.5 Gram(s) IV Push once  dextrose 50% Injectable 25 Gram(s) IV Push once  diphenoxylate/atropine 1 Tablet(s) Oral four times a day PRN  glucagon  Injectable 1 milliGRAM(s) IntraMuscular once  guaiFENesin   Syrup  (Sugar-Free) 100 milliGRAM(s) Oral every 6 hours PRN  insulin lispro (ADMELOG) corrective regimen sliding scale   SubCutaneous Before meals and at bedtime  lactobacillus acidophilus 1 Tablet(s) Oral two times a day  metoprolol tartrate 50 milliGRAM(s) Oral two times a day  multivitamin 1 Tablet(s) Oral daily  nystatin Powder 1 Application(s) Topical every 8 hours  pantoprazole  Injectable 40 milliGRAM(s) IV Push daily  rivaroxaban 20 milliGRAM(s) Oral with dinner  zinc sulfate 220 milliGRAM(s) Oral daily      ----------------------------------------------------------------------------------------   PHYSICAL EXAM  Constitutional - NAD, Comfortable   HEENT - NCAT, EOMI, healing trach site  Chest - no respiratory distress  Cardiovascular - RRR, S1S2   Abdomen -  Soft, NTND   Extremities - no calf tenderness  Neurologic Exam -                    Cognitive - Awake, Alert, AAO to self, place, date, year, situation     Communication - Fluent, No dysarthria     Cranial Nerves - CN 2-12 intact     Motor -                      LEFT    UE - ShAB 4/5, EF 3/5, EE 3/5, WE 0/5,  1/5                    RIGHT UE - ShAB 4/5, EF 2/5, EE 3/5, WE 1/5,  2/5                    LEFT    LE - HF 3/5, KE 4/5, DF 3/5, PF 4/5                    RIGHT LE - HF 4/5, KE 4/5, DF 4/5, PF 4/5        Sensory - Intact to LT         Balance - WNL Static  Psychiatric - Mood stable, Affect WNL  ----------------------------------------------------------------------------------------  ASSESSMENT/PLAN  Patient is a 61yo F w/ AARON, peritonitis s/p Oral's procedure and ileostomy (reversed ), HTN, prior DVT/PE, ?Asthma admitted on 2020 to [facility],  transferred to [facility] for AHRF second to COVID 19, intubated on  complicated by R lung abscesses, multibacterial PNA and bacteremia, ARTEMIO s/p CRRT and HD, and s/p Tracheostomy .  on Klonopin for anxiety per behavioral health  Pain - tylenol prn  htn: norvasc, lopressor  DVT PPX - rivaroxaban 20 milliGRAM(s) Oral with dinner  Diet:  dysphagia 2 mech soft thin liquids  continue bedside PT and OT, bed mobility, transfers, ambulation with ad, adls, bracing  SLP  OOB as tolerates  turn and position q 2  Rehab -     Recommend acute inpatient rehab when medically cleared  patient can tolerate 3 hrs/day of therapy

## 2021-03-16 NOTE — PROGRESS NOTE ADULT - ASSESSMENT
59 YO F with PMHx of HTN, DVT on Xarelto, Peritonitis s/p Oral's Procedure and Ileostomy (reversed in 2011) and AARON who presented to [facility] on 11/18 for AHRF second to COVID 19, intubated on 11/23 complicated by lung abscesses on CT CHEST 12/5, multi-bacterial PNA and bacteremia, ARTEMIO s/p CRRT and HD, and s/p Tracheostomy 12/18. Transferred to RCU for further management, now transferred to . Pt now decannulated and PEG removed, eating dysphagia diet, pending dc to acute rehab.     # LEukocytosis   resolved   infectious work up negative to date, UA positive, asymptomatic   pan Cx negative todate   CXR /CT Chest noted   hold antibiotics  podiatry eval appreciated     # CDIFF (+)   - s/p PO Vancomycin 2/1-2/25  - Stool O&P and GI PCR NGTD, c/w Lomotil QID   - GI eval appreciated   -stool more formed , no further diarrhea   - pending D/C to acute rehab, on hold due to repeat covid positive   - Isolation and quarantine per protocol , D/c Planing to rehab       # ARDS second to COVID vs Multi-bacterial PNA/ R Lung Abscess  - s/p Intubated on 11/23 and c/b multi-bacterial PNA and lung abscesses.   - s/p Tracheostomy on 12/18 by CTSx. Sutures out 1/1  - s/p Air leak noted and s/p Bivona with CTSx 12/31. s/p Downsized to 6DCT 2/25  - s/p Successfully decannulated on 3/1, Now on 2LNC saturating well  - Proventil and Chest PT q6hr PRN      # ARTEMIO s/p CRRT and HD   - ARTEMIO now resolved and no longer required HD.   - Hypokalemia in setting of diarrhea/ CDIFF. Monitor electrolytes.   - Monitor renal function and avoid nephrotoxins.       # Sepsis second to COVID vs Multi-bacterial PNA/ Bacteremia and R Lung Abscess vs CDIFF (+)   - COVID with superimposed multi-bacterial VAP vs aspiration PNA vs cavitary PNA.   - SCx (11/24) MSSA and BCx (11/27) with MSSA and Proteus Bacteremia  - SCx (12/1, 12/12 and 12/27) with Stenotrophomonas.   - SCx (1/9) with  Pseudomonas and Stenotrophomonas   - Bcx has been persistently negative from 12/1, and most recently 1/12   - CT CHEST 12/4 with right lung cavitation.   - s/p multiple antibiotics, but now completed Fortaz and Flagyl (1/12-1/16)  - RPT CT CHEST 1/28 with improvement of right lung abscess   - SCx with  Pseudomonas and Stenotrophomonas and s/p TED/ Fortaz (1/28-2/6)      # DMII   - Continue on ISS   - FS well controlled      # Hx of DVT   - c/w Xarelto      # Contracted Wrists   - PT/OT working on strength   OMT at the bedside       # CODE STATUS - FULL CODE     # DISPO - PM&R recs ACUTE REHAB

## 2021-03-17 LAB
ANION GAP SERPL CALC-SCNC: 13 MMOL/L — SIGNIFICANT CHANGE UP (ref 7–14)
BUN SERPL-MCNC: 9 MG/DL — SIGNIFICANT CHANGE UP (ref 7–23)
CALCIUM SERPL-MCNC: 10 MG/DL — SIGNIFICANT CHANGE UP (ref 8.4–10.5)
CHLORIDE SERPL-SCNC: 105 MMOL/L — SIGNIFICANT CHANGE UP (ref 98–107)
CO2 SERPL-SCNC: 22 MMOL/L — SIGNIFICANT CHANGE UP (ref 22–31)
CREAT SERPL-MCNC: 0.41 MG/DL — LOW (ref 0.5–1.3)
GLUCOSE SERPL-MCNC: 128 MG/DL — HIGH (ref 70–99)
HCT VFR BLD CALC: 40.4 % — SIGNIFICANT CHANGE UP (ref 34.5–45)
HGB BLD-MCNC: 12.1 G/DL — SIGNIFICANT CHANGE UP (ref 11.5–15.5)
MAGNESIUM SERPL-MCNC: 1.7 MG/DL — SIGNIFICANT CHANGE UP (ref 1.6–2.6)
MCHC RBC-ENTMCNC: 26.8 PG — LOW (ref 27–34)
MCHC RBC-ENTMCNC: 30 GM/DL — LOW (ref 32–36)
MCV RBC AUTO: 89.6 FL — SIGNIFICANT CHANGE UP (ref 80–100)
NRBC # BLD: 0 /100 WBCS — SIGNIFICANT CHANGE UP
NRBC # FLD: 0 K/UL — SIGNIFICANT CHANGE UP
PHOSPHATE SERPL-MCNC: 4.6 MG/DL — HIGH (ref 2.5–4.5)
PLATELET # BLD AUTO: 422 K/UL — HIGH (ref 150–400)
POTASSIUM SERPL-MCNC: 3.7 MMOL/L — SIGNIFICANT CHANGE UP (ref 3.5–5.3)
POTASSIUM SERPL-SCNC: 3.7 MMOL/L — SIGNIFICANT CHANGE UP (ref 3.5–5.3)
RBC # BLD: 4.51 M/UL — SIGNIFICANT CHANGE UP (ref 3.8–5.2)
RBC # FLD: 16.6 % — HIGH (ref 10.3–14.5)
SARS-COV-2 RNA SPEC QL NAA+PROBE: SIGNIFICANT CHANGE UP
SODIUM SERPL-SCNC: 140 MMOL/L — SIGNIFICANT CHANGE UP (ref 135–145)
WBC # BLD: 7.72 K/UL — SIGNIFICANT CHANGE UP (ref 3.8–10.5)
WBC # FLD AUTO: 7.72 K/UL — SIGNIFICANT CHANGE UP (ref 3.8–10.5)

## 2021-03-17 RX ORDER — AMLODIPINE BESYLATE 2.5 MG/1
1 TABLET ORAL
Qty: 0 | Refills: 0 | DISCHARGE
Start: 2021-03-17

## 2021-03-17 RX ORDER — RIVAROXABAN 15 MG-20MG
1 KIT ORAL
Qty: 30 | Refills: 0
Start: 2021-03-17 | End: 2021-04-15

## 2021-03-17 RX ORDER — LACTOBACILLUS ACIDOPHILUS 100MM CELL
1 CAPSULE ORAL
Qty: 0 | Refills: 0 | DISCHARGE
Start: 2021-03-17

## 2021-03-17 RX ORDER — ZINC SULFATE TAB 220 MG (50 MG ZINC EQUIVALENT) 220 (50 ZN) MG
1 TAB ORAL
Qty: 0 | Refills: 0 | DISCHARGE
Start: 2021-03-17

## 2021-03-17 RX ORDER — PANTOPRAZOLE SODIUM 20 MG/1
1 TABLET, DELAYED RELEASE ORAL
Qty: 0 | Refills: 0 | DISCHARGE

## 2021-03-17 RX ORDER — ALBUTEROL 90 UG/1
2 AEROSOL, METERED ORAL
Qty: 0 | Refills: 0 | DISCHARGE
Start: 2021-03-17

## 2021-03-17 RX ORDER — METFORMIN HYDROCHLORIDE 850 MG/1
1 TABLET ORAL
Qty: 0 | Refills: 0 | DISCHARGE

## 2021-03-17 RX ORDER — METOPROLOL TARTRATE 50 MG
1 TABLET ORAL
Qty: 0 | Refills: 0 | DISCHARGE
Start: 2021-03-17

## 2021-03-17 RX ORDER — RIVAROXABAN 15 MG-20MG
1 KIT ORAL
Qty: 0 | Refills: 0 | DISCHARGE
Start: 2021-03-17

## 2021-03-17 RX ORDER — CHOLESTYRAMINE 4 G/9G
1 POWDER, FOR SUSPENSION ORAL
Qty: 0 | Refills: 0 | DISCHARGE
Start: 2021-03-17

## 2021-03-17 RX ORDER — CLONAZEPAM 1 MG
1 TABLET ORAL
Qty: 0 | Refills: 0 | DISCHARGE
Start: 2021-03-17

## 2021-03-17 RX ORDER — COLLAGENASE CLOSTRIDIUM HIST. 250 UNIT/G
1 OINTMENT (GRAM) TOPICAL
Qty: 0 | Refills: 0 | DISCHARGE
Start: 2021-03-17

## 2021-03-17 RX ORDER — NYSTATIN CREAM 100000 [USP'U]/G
1 CREAM TOPICAL
Qty: 0 | Refills: 0 | DISCHARGE
Start: 2021-03-17

## 2021-03-17 RX ADMIN — Medication 50 MILLIGRAM(S): at 17:31

## 2021-03-17 RX ADMIN — Medication 1 TABLET(S): at 17:31

## 2021-03-17 RX ADMIN — NYSTATIN CREAM 1 APPLICATION(S): 100000 CREAM TOPICAL at 12:35

## 2021-03-17 RX ADMIN — NYSTATIN CREAM 1 APPLICATION(S): 100000 CREAM TOPICAL at 21:36

## 2021-03-17 RX ADMIN — CHOLESTYRAMINE 4 GRAM(S): 4 POWDER, FOR SUSPENSION ORAL at 10:10

## 2021-03-17 RX ADMIN — PANTOPRAZOLE SODIUM 40 MILLIGRAM(S): 20 TABLET, DELAYED RELEASE ORAL at 12:34

## 2021-03-17 RX ADMIN — ALBUTEROL 2 PUFF(S): 90 AEROSOL, METERED ORAL at 06:14

## 2021-03-17 RX ADMIN — Medication 50 MILLIGRAM(S): at 06:05

## 2021-03-17 RX ADMIN — Medication 1 TABLET(S): at 06:05

## 2021-03-17 RX ADMIN — CHOLESTYRAMINE 4 GRAM(S): 4 POWDER, FOR SUSPENSION ORAL at 21:36

## 2021-03-17 RX ADMIN — ZINC SULFATE TAB 220 MG (50 MG ZINC EQUIVALENT) 220 MILLIGRAM(S): 220 (50 ZN) TAB at 12:34

## 2021-03-17 RX ADMIN — ALBUTEROL 2 PUFF(S): 90 AEROSOL, METERED ORAL at 17:31

## 2021-03-17 RX ADMIN — Medication 1 APPLICATION(S): at 06:05

## 2021-03-17 RX ADMIN — CHLORHEXIDINE GLUCONATE 1 APPLICATION(S): 213 SOLUTION TOPICAL at 12:35

## 2021-03-17 RX ADMIN — AMLODIPINE BESYLATE 5 MILLIGRAM(S): 2.5 TABLET ORAL at 06:05

## 2021-03-17 RX ADMIN — Medication 0.5 MILLIGRAM(S): at 12:39

## 2021-03-17 RX ADMIN — Medication 1 TABLET(S): at 12:34

## 2021-03-17 RX ADMIN — Medication 1 DROP(S): at 06:14

## 2021-03-17 RX ADMIN — NYSTATIN CREAM 1 APPLICATION(S): 100000 CREAM TOPICAL at 06:05

## 2021-03-17 RX ADMIN — ALBUTEROL 2 PUFF(S): 90 AEROSOL, METERED ORAL at 10:10

## 2021-03-17 RX ADMIN — Medication 1 APPLICATION(S): at 17:31

## 2021-03-17 RX ADMIN — RIVAROXABAN 20 MILLIGRAM(S): KIT at 17:31

## 2021-03-17 RX ADMIN — ALBUTEROL 2 PUFF(S): 90 AEROSOL, METERED ORAL at 21:36

## 2021-03-17 NOTE — PROGRESS NOTE ADULT - SUBJECTIVE AND OBJECTIVE BOX
Chief Complaint:  Patient is a 60y old  Female who presents with a chief complaint of Transfer from [facility] (17 Mar 2021 13:32)      Interval Events:   no abd pain  no diarrhea    Allergies:  Kiwi (Unknown)  latex (Anaphylaxis)  latex (Unknown)  penicillin (Other)  penicillin (Unknown)  perfume  hives (Other)  potassium acetate (Other)  soap additives hives (Other)      Hospital Medications:  acetaminophen    Suspension .. 650 milliGRAM(s) Oral every 6 hours PRN  ALBUTerol    90 MICROgram(s) HFA Inhaler 2 Puff(s) Inhalation every 6 hours  amLODIPine   Tablet 5 milliGRAM(s) Oral daily  artificial tears (preservative free) Ophthalmic Solution 1 Drop(s) Both EYES every 8 hours PRN  chlorhexidine 4% Liquid 1 Application(s) Topical daily  cholestyramine Powder (Sugar-Free) 4 Gram(s) Oral two times a day  clonazePAM  Tablet 0.5 milliGRAM(s) Oral daily  collagenase Ointment 1 Application(s) Topical two times a day  dextrose 40% Gel 15 Gram(s) Oral once  dextrose 5%. 1000 milliLiter(s) IV Continuous <Continuous>  dextrose 5%. 1000 milliLiter(s) IV Continuous <Continuous>  dextrose 5%. 1000 milliLiter(s) IV Continuous <Continuous>  dextrose 50% Injectable 25 Gram(s) IV Push once  dextrose 50% Injectable 12.5 Gram(s) IV Push once  dextrose 50% Injectable 25 Gram(s) IV Push once  diphenoxylate/atropine 1 Tablet(s) Oral four times a day PRN  glucagon  Injectable 1 milliGRAM(s) IntraMuscular once  guaiFENesin   Syrup  (Sugar-Free) 100 milliGRAM(s) Oral every 6 hours PRN  insulin lispro (ADMELOG) corrective regimen sliding scale   SubCutaneous Before meals and at bedtime  lactobacillus acidophilus 1 Tablet(s) Oral two times a day  metoprolol tartrate 50 milliGRAM(s) Oral two times a day  multivitamin 1 Tablet(s) Oral daily  nystatin Powder 1 Application(s) Topical every 8 hours  pantoprazole  Injectable 40 milliGRAM(s) IV Push daily  rivaroxaban 20 milliGRAM(s) Oral with dinner  zinc sulfate 220 milliGRAM(s) Oral daily      PMHX/PSHX:  Pneumomediastinum    Umbilical hernia    Osteoarthritis of both knees, unspecified osteoarthritis type    AARON (Obstructive Sleep Apnea)    Pneumonia    S/P Colon Resection    pt with allergies to perfumes and addidtive to soaps, requires hypoallergenic sheets and gowns, line    Cervical Dysplasia    Latex Sensitivity    GERD (Gastroesophageal Reflux Disease)    Asthma    Anemia    S/P Joint Replacement    DVT (Deep Venous Thrombosis)    HTN (Hypertension)    H/O ileostomy    S/P LEEP    S/P Exploratory Laparotomy    S/P Colonoscopy    S/P Endoscopy    S/P  Section    History of Tubal Ligation    S/P Knee Surgery    Umbilical Hernia    Osteoarthritis of Knee    HTN (Hypertension)        Family history:  Family history of gastric cancer    Family history of breast cancer (Grandparent)        ROS:     General:  No wt loss, fevers, chills, night sweats, fatigue,   Eyes:  Good vision, no reported pain  ENT:  No sore throat, pain, runny nose, dysphagia  CV:  No pain, palpitations, hypo/hypertension  Resp:  No dyspnea, cough, tachypnea, wheezing  GI:  See HPI  :  No pain, bleeding, incontinence, nocturia  Muscle:  No pain, weakness  Neuro:  No weakness, tingling, memory problems  Psych:  No fatigue, insomnia, mood problems, depression  Endocrine:  No polyuria, polydipsia, cold/heat intolerance  Heme:  No petechiae, ecchymosis, easy bruisability  Skin:  No rash, edema      PHYSICAL EXAM:     GENERAL:  Appears stated age, well-groomed, well-nourished, no distress  HEENT:  NC/AT,  conjunctivae clear, sclera-anicteric  NECK: Trachea midline, supple  CHEST:  Full & symmetric excursion, no increased effort, breath sounds clear  HEART:  Regular rhythm, no lola/heave  ABDOMEN:  Soft, non-tender, non-distended, normoactive bowel sounds,  no masses ,no hepato-splenomegaly,   EXTREMITIES:  no cyanosis,clubbing or edema  SKIN:  No rash/erythema/petechiae, no jaundice  NEURO:  Alert, oriented, no asterixis  RECTAL: Deferred    Vital Signs:  Vital Signs Last 24 Hrs  T(C): 36.8 (17 Mar 2021 17:30), Max: 36.9 (17 Mar 2021 12:33)  T(F): 98.3 (17 Mar 2021 17:30), Max: 98.5 (17 Mar 2021 12:33)  HR: 95 (17 Mar 2021 17:30) (86 - 98)  BP: 154/74 (17 Mar 2021 17:30) (137/55 - 154/74)  BP(mean): --  RR: 16 (17 Mar 2021 17:30) (15 - 18)  SpO2: 97% (17 Mar 2021 17:30) (97% - 98%)  Daily     Daily     LABS:                        12.1   7.72  )-----------( 422      ( 17 Mar 2021 07:54 )             40.4     03-17    140  |  105  |  9   ----------------------------<  128<H>  3.7   |  22  |  0.41<L>    Ca    10.0      17 Mar 2021 07:54  Phos  4.6     03-17  Mg     1.7     03-17          Urinalysis Basic - ( 15 Mar 2021 22:48 )    Color: Yellow / Appearance: Slightly Turbid / S.014 / pH: x  Gluc: x / Ketone: Negative  / Bili: Negative / Urobili: <2 mg/dL   Blood: x / Protein: Trace / Nitrite: Positive   Leuk Esterase: Large / RBC: 3 /HPF /  /HPF   Sq Epi: x / Non Sq Epi: 0 /HPF / Bacteria: Many          Imaging:

## 2021-03-17 NOTE — PROGRESS NOTE ADULT - ASSESSMENT
60 year old female with prolonged hospital course secondary to COVID 19, GI consulted for persistent diarrhea     Problem/Recommendation - 1:  Problem: COVID-19. Recommendation: status post treatment by infectious disease  now off of isolation.     Problem/Recommendation - 2:  ·  Problem: Leukocytosis: unclear etiology. Improved to 9 from 17 yesterday. No diarrhea, doubt c-diff  -trend WBC  -observe for any focal symptoms     Problem/Recommendation - 3:  ·  Problem: Diarrhea.  Recommendation: now improved, was most likely due to tube feeds. Now tolerating regular food.  -lomotil PRN     Problem/Recommendation - 4:  ·  Problem: Hernia, incisional.  Recommendation: reducible, nontender. No signs of incarceration.   -serial abdominal exams.      Problem/Recommendation - 5:  ·  Problem: Gastrostomy present.  Recommendation: patient tolerating good PO intake. Passed swallow eval. Gastrostomy removed at bedside and bandaged.     Problem/Recommendation - 6:  Problem: ARTEMIO (acute kidney injury). Recommendation: s/p CVVHD, now improved.     Problem/Recommendation - 7:  Problem: History of pressure ulcer. Recommendation: per RCU team.     Problem/Recommendation - 8:  Problem: Clostridium difficile infection. 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 abdominal ultrasound showed hepatic steatosis, no biliary pathology  -periodic LFT measurements.    Attending Attestation:   Differential diagnosis and plan of care discussed with patient after the evaluation  65 Minutes spent on total encounter of which more than fifty percent of the encounter was spent counseling and/or coordinating care by the attending physician.      Ranjeet Ponce M.D.   Gastroenterology and Hepatology  Cell: [phone].

## 2021-03-17 NOTE — PROGRESS NOTE ADULT - SUBJECTIVE AND OBJECTIVE BOX
DATE OF SERVICE: 21 @ 13:32    Subjective: Patient seen and examined. No new events except as noted.   doing okay  D./c planing     REVIEW OF SYSTEMS:    CONSTITUTIONAL: No weakness, fevers or chills  EYES/ENT: No visual changes;  No vertigo or throat pain   NECK: No pain or stiffness  RESPIRATORY: No cough, wheezing, hemoptysis; No shortness of breath  CARDIOVASCULAR: No chest pain or palpitations  GASTROINTESTINAL: No abdominal or epigastric pain. No nausea, vomiting, or hematemesis; No diarrhea or constipation. No melena or hematochezia.  GENITOURINARY: No dysuria, frequency or hematuria  NEUROLOGICAL: No numbness or weakness  SKIN: No itching, burning, rashes, or lesions   All other review of systems is negative unless indicated above.    MEDICATIONS:  MEDICATIONS  (STANDING):  ALBUTerol    90 MICROgram(s) HFA Inhaler 2 Puff(s) Inhalation every 6 hours  amLODIPine   Tablet 5 milliGRAM(s) Oral daily  chlorhexidine 4% Liquid 1 Application(s) Topical daily  cholestyramine Powder (Sugar-Free) 4 Gram(s) Oral two times a day  clonazePAM  Tablet 0.5 milliGRAM(s) Oral daily  collagenase Ointment 1 Application(s) Topical two times a day  dextrose 40% Gel 15 Gram(s) Oral once  dextrose 5%. 1000 milliLiter(s) (50 mL/Hr) IV Continuous <Continuous>  dextrose 5%. 1000 milliLiter(s) (100 mL/Hr) IV Continuous <Continuous>  dextrose 5%. 1000 milliLiter(s) (75 mL/Hr) IV Continuous <Continuous>  dextrose 50% Injectable 25 Gram(s) IV Push once  dextrose 50% Injectable 12.5 Gram(s) IV Push once  dextrose 50% Injectable 25 Gram(s) IV Push once  glucagon  Injectable 1 milliGRAM(s) IntraMuscular once  insulin lispro (ADMELOG) corrective regimen sliding scale   SubCutaneous Before meals and at bedtime  lactobacillus acidophilus 1 Tablet(s) Oral two times a day  metoprolol tartrate 50 milliGRAM(s) Oral two times a day  multivitamin 1 Tablet(s) Oral daily  nystatin Powder 1 Application(s) Topical every 8 hours  pantoprazole  Injectable 40 milliGRAM(s) IV Push daily  rivaroxaban 20 milliGRAM(s) Oral with dinner  zinc sulfate 220 milliGRAM(s) Oral daily      PHYSICAL EXAM:  T(C): 36.9 (21 @ 12:33), Max: 36.9 (21 @ 12:33)  HR: 98 (21 @ 12:33) (77 - 98)  BP: 143/75 (21 @ 12:33) (132/61 - 145/69)  RR: 15 (21 @ 12:33) (15 - 18)  SpO2: 98% (21 @ 12:33) (97% - 98%)  Wt(kg): --  I&O's Summary        Appearance: awake, AOx 3	  HEENT:  PERRLA   Lymphatic: No lymphadenopathy   Cardiovascular: Normal S1 S2  Respiratory: normal effort , clear  Gastrointestinal:  Soft, Non-tender  Skin: No rashes,  warm to touch  Psychiatry:  Mood & affect appropriate  Musculuskeletal: Edema       All labs, Imaging and EKGs personally reviewed                           12.1   7.72  )-----------( 422      ( 17 Mar 2021 07:54 )             40.4                   140  |  105  |  9   ----------------------------<  128<H>  3.7   |  22  |  0.41<L>    Ca    10.0      17 Mar 2021 07:54  Phos  4.6       Mg     1.7                              Urinalysis Basic - ( 15 Mar 2021 22:48 )    Color: Yellow / Appearance: Slightly Turbid / S.014 / pH: x  Gluc: x / Ketone: Negative  / Bili: Negative / Urobili: <2 mg/dL   Blood: x / Protein: Trace / Nitrite: Positive   Leuk Esterase: Large / RBC: 3 /HPF /  /HPF   Sq Epi: x / Non Sq Epi: 0 /HPF / Bacteria: Many

## 2021-03-17 NOTE — PROGRESS NOTE ADULT - ASSESSMENT
61 YO F with PMHx of HTN, DVT on Xarelto, Peritonitis s/p Oral's Procedure and Ileostomy (reversed in 2011) and AARON who presented to [facility] on 11/18 for AHRF second to COVID 19, intubated on 11/23 complicated by lung abscesses on CT CHEST 12/5, multi-bacterial PNA and bacteremia, ARTEMIO s/p CRRT and HD, and s/p Tracheostomy 12/18. Transferred to [facility] for further management, now transferred to . Pt now decannulated and PEG removed, eating dysphagia diet, pending dc to acute rehab.     # LEukocytosis   resolved   infectious work up negative to date, UA positive, asymptomatic   pan Cx negative todate   CXR /CT Chest noted   hold antibiotics  podiatry eval appreciated     # CDIFF (+)   - s/p PO Vancomycin 2/1-2/25  - Stool O&P and GI PCR NGTD, c/w Lomotil QID   - GI eval appreciated   -stool more formed , no further diarrhea   - pending D/C to acute rehab, on hold due to repeat covid positive   - Isolation and quarantine per protocol , D/c Planing to rehab       # ARDS second to COVID vs Multi-bacterial PNA/ R Lung Abscess  - s/p Intubated on 11/23 and c/b multi-bacterial PNA and lung abscesses.   - s/p Tracheostomy on 12/18 by CTSx. Sutures out 1/1  - s/p Air leak noted and s/p Bivona with CTSx 12/31. s/p Downsized to 6DCT 2/25  - s/p Successfully decannulated on 3/1, Now on 2LNC saturating well  - Proventil and Chest PT q6hr PRN      # ARTEMIO s/p CRRT and HD   - ARTEMIO now resolved and no longer required HD.   - Hypokalemia in setting of diarrhea/ CDIFF. Monitor electrolytes.   - Monitor renal function and avoid nephrotoxins.       # Sepsis second to COVID vs Multi-bacterial PNA/ Bacteremia and R Lung Abscess vs CDIFF (+)   - COVID with superimposed multi-bacterial VAP vs aspiration PNA vs cavitary PNA.   - SCx (11/24) MSSA and BCx (11/27) with MSSA and Proteus Bacteremia  - SCx (12/1, 12/12 and 12/27) with Stenotrophomonas.   - SCx (1/9) with  Pseudomonas and Stenotrophomonas   - Bcx has been persistently negative from 12/1, and most recently 1/12   - CT CHEST 12/4 with right lung cavitation.   - s/p multiple antibiotics, but now completed Fortaz and Flagyl (1/12-1/16)  - RPT CT CHEST 1/28 with improvement of right lung abscess   - SCx with  Pseudomonas and Stenotrophomonas and s/p TED/ Fortaz (1/28-2/6)      # DMII   - Continue on ISS   - FS well controlled      # Hx of DVT   - c/w Xarelto      # Contracted Wrists   - PT/OT working on strength   OMT at the bedside       # CODE STATUS - FULL CODE     # DISPO - PM&R recs ACUTE REHAB       59 YO F with PMHx of HTN, DVT on Xarelto, Peritonitis s/p Oral's Procedure and Ileostomy (reversed in 2011) and AARON who presented to [facility] on 11/18 for AHRF second to COVID 19, intubated on 11/23 complicated by lung abscesses on CT CHEST 12/5, multi-bacterial PNA and bacteremia, ARTEMIO s/p CRRT and HD, and s/p Tracheostomy 12/18. Transferred to [facility] for further management, now transferred to . Pt now decannulated and PEG removed, eating dysphagia diet, pending dc to acute rehab.     # LEukocytosis   resolved   infectious work up negative to date, UA positive, asymptomatic   pan Cx negative todate   CXR /CT Chest noted   hold antibiotics  podiatry eval appreciated     # CDIFF (+)   - s/p PO Vancomycin 2/1-2/25  - Stool O&P and GI PCR NGTD, c/w Lomotil QID   - GI eval appreciated   -stool more formed , no further diarrhea   - pending D/C to acute rehab, on hold due to repeat covid positive   - Isolation and quarantine per protocol , D/c Planing to rehab       # ARDS second to COVID vs Multi-bacterial PNA/ R Lung Abscess  - s/p Intubated on 11/23 and c/b multi-bacterial PNA and lung abscesses.   - s/p Tracheostomy on 12/18 by CTSx. Sutures out 1/1  - s/p Air leak noted and s/p Bivona with CTSx 12/31. s/p Downsized to 6DCT 2/25  - s/p Successfully decannulated on 3/1, Now on 2LNC saturating well  - Proventil and Chest PT q6hr PRN      # ARTEMIO s/p CRRT and HD   - ARTEMIO now resolved and no longer required HD.   - Hypokalemia in setting of diarrhea/ CDIFF. Monitor electrolytes.   - Monitor renal function and avoid nephrotoxins.       # Sepsis second to COVID vs Multi-bacterial PNA/ Bacteremia and R Lung Abscess vs CDIFF (+)   - COVID with superimposed multi-bacterial VAP vs aspiration PNA vs cavitary PNA.   - SCx (11/24) MSSA and BCx (11/27) with MSSA and Proteus Bacteremia  - SCx (12/1, 12/12 and 12/27) with Stenotrophomonas.   - SCx (1/9) with  Pseudomonas and Stenotrophomonas   - Bcx has been persistently negative from 12/1, and most recently 1/12   - CT CHEST 12/4 with right lung cavitation.   - s/p multiple antibiotics, but now completed Fortaz and Flagyl (1/12-1/16)  - RPT CT CHEST 1/28 with improvement of right lung abscess   - SCx with  Pseudomonas and Stenotrophomonas and s/p TED/ Fortaz (1/28-2/6)      # DMII   - Continue on ISS   - FS well controlled  - will D/C on metformin , A1C trending up       # Hx of DVT   - c/w Xarelto      # Contracted Wrists   - PT/OT working on strength   OMT at the bedside       # CODE STATUS - FULL CODE     # DISPO - PM&R recs ACUTE REHAB

## 2021-03-18 LAB
CULTURE RESULTS: SIGNIFICANT CHANGE UP
CULTURE RESULTS: SIGNIFICANT CHANGE UP
SPECIMEN SOURCE: SIGNIFICANT CHANGE UP
SPECIMEN SOURCE: SIGNIFICANT CHANGE UP

## 2021-03-18 RX ORDER — CLONAZEPAM 1 MG
0.5 TABLET ORAL DAILY
Refills: 0 | Status: DISCONTINUED | OUTPATIENT
Start: 2021-03-18 | End: 2021-03-19

## 2021-03-18 RX ORDER — PANTOPRAZOLE SODIUM 20 MG/1
40 TABLET, DELAYED RELEASE ORAL
Refills: 0 | Status: DISCONTINUED | OUTPATIENT
Start: 2021-03-18 | End: 2021-03-19

## 2021-03-18 RX ADMIN — Medication 50 MILLIGRAM(S): at 17:38

## 2021-03-18 RX ADMIN — Medication 0.5 MILLIGRAM(S): at 12:08

## 2021-03-18 RX ADMIN — Medication 1 APPLICATION(S): at 17:38

## 2021-03-18 RX ADMIN — Medication 50 MILLIGRAM(S): at 06:12

## 2021-03-18 RX ADMIN — ALBUTEROL 2 PUFF(S): 90 AEROSOL, METERED ORAL at 12:08

## 2021-03-18 RX ADMIN — Medication 1 TABLET(S): at 17:38

## 2021-03-18 RX ADMIN — Medication 1 TABLET(S): at 12:07

## 2021-03-18 RX ADMIN — PANTOPRAZOLE SODIUM 40 MILLIGRAM(S): 20 TABLET, DELAYED RELEASE ORAL at 12:07

## 2021-03-18 RX ADMIN — NYSTATIN CREAM 1 APPLICATION(S): 100000 CREAM TOPICAL at 17:38

## 2021-03-18 RX ADMIN — AMLODIPINE BESYLATE 5 MILLIGRAM(S): 2.5 TABLET ORAL at 06:12

## 2021-03-18 RX ADMIN — Medication 1 TABLET(S): at 06:12

## 2021-03-18 RX ADMIN — RIVAROXABAN 20 MILLIGRAM(S): KIT at 17:38

## 2021-03-18 RX ADMIN — ALBUTEROL 2 PUFF(S): 90 AEROSOL, METERED ORAL at 17:39

## 2021-03-18 RX ADMIN — NYSTATIN CREAM 1 APPLICATION(S): 100000 CREAM TOPICAL at 06:13

## 2021-03-18 RX ADMIN — CHLORHEXIDINE GLUCONATE 1 APPLICATION(S): 213 SOLUTION TOPICAL at 12:08

## 2021-03-18 RX ADMIN — ALBUTEROL 2 PUFF(S): 90 AEROSOL, METERED ORAL at 06:12

## 2021-03-18 RX ADMIN — ZINC SULFATE TAB 220 MG (50 MG ZINC EQUIVALENT) 220 MILLIGRAM(S): 220 (50 ZN) TAB at 12:07

## 2021-03-18 RX ADMIN — ALBUTEROL 2 PUFF(S): 90 AEROSOL, METERED ORAL at 23:05

## 2021-03-18 RX ADMIN — Medication 1 APPLICATION(S): at 06:12

## 2021-03-18 RX ADMIN — CHOLESTYRAMINE 4 GRAM(S): 4 POWDER, FOR SUSPENSION ORAL at 21:39

## 2021-03-18 RX ADMIN — CHOLESTYRAMINE 4 GRAM(S): 4 POWDER, FOR SUSPENSION ORAL at 08:40

## 2021-03-18 NOTE — PROGRESS NOTE ADULT - SUBJECTIVE AND OBJECTIVE BOX
Chief Complaint:  Patient is a 60y old  Female who presents with a chief complaint of Transfer from [facility] (17 Mar 2021 13:32)      Interval Events:   no abd pain  no diarrhea    Allergies:  Kiwi (Unknown)  latex (Anaphylaxis)  latex (Unknown)  penicillin (Other)  penicillin (Unknown)  perfume  hives (Other)  potassium acetate (Other)  soap additives hives (Other)      Hospital Medications:  acetaminophen    Suspension .. 650 milliGRAM(s) Oral every 6 hours PRN  ALBUTerol    90 MICROgram(s) HFA Inhaler 2 Puff(s) Inhalation every 6 hours  amLODIPine   Tablet 5 milliGRAM(s) Oral daily  artificial tears (preservative free) Ophthalmic Solution 1 Drop(s) Both EYES every 8 hours PRN  chlorhexidine 4% Liquid 1 Application(s) Topical daily  cholestyramine Powder (Sugar-Free) 4 Gram(s) Oral two times a day  clonazePAM  Tablet 0.5 milliGRAM(s) Oral daily  collagenase Ointment 1 Application(s) Topical two times a day  dextrose 40% Gel 15 Gram(s) Oral once  dextrose 5%. 1000 milliLiter(s) IV Continuous <Continuous>  dextrose 5%. 1000 milliLiter(s) IV Continuous <Continuous>  dextrose 5%. 1000 milliLiter(s) IV Continuous <Continuous>  dextrose 50% Injectable 25 Gram(s) IV Push once  dextrose 50% Injectable 12.5 Gram(s) IV Push once  dextrose 50% Injectable 25 Gram(s) IV Push once  diphenoxylate/atropine 1 Tablet(s) Oral four times a day PRN  glucagon  Injectable 1 milliGRAM(s) IntraMuscular once  guaiFENesin   Syrup  (Sugar-Free) 100 milliGRAM(s) Oral every 6 hours PRN  insulin lispro (ADMELOG) corrective regimen sliding scale   SubCutaneous Before meals and at bedtime  lactobacillus acidophilus 1 Tablet(s) Oral two times a day  metoprolol tartrate 50 milliGRAM(s) Oral two times a day  multivitamin 1 Tablet(s) Oral daily  nystatin Powder 1 Application(s) Topical every 8 hours  pantoprazole  Injectable 40 milliGRAM(s) IV Push daily  rivaroxaban 20 milliGRAM(s) Oral with dinner  zinc sulfate 220 milliGRAM(s) Oral daily      PMHX/PSHX:  Pneumomediastinum    Umbilical hernia    Osteoarthritis of both knees, unspecified osteoarthritis type    AARON (Obstructive Sleep Apnea)    Pneumonia    S/P Colon Resection    pt with allergies to perfumes and addidtive to soaps, requires hypoallergenic sheets and gowns, line    Cervical Dysplasia    Latex Sensitivity    GERD (Gastroesophageal Reflux Disease)    Asthma    Anemia    S/P Joint Replacement    DVT (Deep Venous Thrombosis)    HTN (Hypertension)    H/O ileostomy    S/P LEEP    S/P Exploratory Laparotomy    S/P Colonoscopy    S/P Endoscopy    S/P  Section    History of Tubal Ligation    S/P Knee Surgery    Umbilical Hernia    Osteoarthritis of Knee    HTN (Hypertension)        Family history:  Family history of gastric cancer    Family history of breast cancer (Grandparent)        ROS:     General:  No wt loss, fevers, chills, night sweats, fatigue,   Eyes:  Good vision, no reported pain  ENT:  No sore throat, pain, runny nose, dysphagia  CV:  No pain, palpitations, hypo/hypertension  Resp:  No dyspnea, cough, tachypnea, wheezing  GI:  See HPI  :  No pain, bleeding, incontinence, nocturia  Muscle:  No pain, weakness  Neuro:  No weakness, tingling, memory problems  Psych:  No fatigue, insomnia, mood problems, depression  Endocrine:  No polyuria, polydipsia, cold/heat intolerance  Heme:  No petechiae, ecchymosis, easy bruisability  Skin:  No rash, edema      PHYSICAL EXAM:     GENERAL:  Appears stated age, well-groomed, well-nourished, no distress  HEENT:  NC/AT,  conjunctivae clear, sclera-anicteric  NECK: Trachea midline, supple  CHEST:  Full & symmetric excursion, no increased effort, breath sounds clear  HEART:  Regular rhythm, no lola/heave  ABDOMEN:  Soft, non-tender, non-distended, normoactive bowel sounds,  no masses ,no hepato-splenomegaly,   EXTREMITIES:  no cyanosis,clubbing or edema  SKIN:  No rash/erythema/petechiae, no jaundice  NEURO:  Alert, oriented, no asterixis  RECTAL: Deferred    Labs personally reviewed  Vital Signs Last 24 Hrs  T(C): 36.9 (18 Mar 2021 17:37), Max: 36.9 (18 Mar 2021 17:37)  T(F): 98.4 (18 Mar 2021 17:37), Max: 98.4 (18 Mar 2021 17:37)  HR: 91 (18 Mar 2021 17:37) (91 - 100)  BP: 143/61 (18 Mar 2021 17:37) (140/77 - 146/83)  BP(mean): --  RR: 18 (18 Mar 2021 17:37) (16 - 18)  SpO2: 96% (18 Mar 2021 17:37) (95% - 97%)    CBC Full  -  ( 17 Mar 2021 07:54 )  WBC Count : 7.72 K/uL  RBC Count : 4.51 M/uL  Hemoglobin : 12.1 g/dL  Hematocrit : 40.4 %  Platelet Count - Automated : 422 K/uL  Mean Cell Volume : 89.6 fL  Mean Cell Hemoglobin : 26.8 pg  Mean Cell Hemoglobin Concentration : 30.0 gm/dL  Auto Neutrophil # : x  Auto Lymphocyte # : x  Auto Monocyte # : x  Auto Eosinophil # : x  Auto Basophil # : x  Auto Neutrophil % : x  Auto Lymphocyte % : x  Auto Monocyte % : x  Auto Eosinophil % : x  Auto Basophil % : x          140  |  105  |  9   ----------------------------<  128<H>  3.7   |  22  |  0.41<L>    Ca    10.0      17 Mar 2021 07:54  Phos  4.6       Mg     1.7

## 2021-03-18 NOTE — PROGRESS NOTE ADULT - ASSESSMENT
59 YO F with PMHx of HTN, DVT on Xarelto, Peritonitis s/p Oral's Procedure and Ileostomy (reversed in 2011) and AARON who presented to [facility] on 11/18 for AHRF second to COVID 19, intubated on 11/23 complicated by lung abscesses on CT CHEST 12/5, multi-bacterial PNA and bacteremia, ARTEMIO s/p CRRT and HD, and s/p Tracheostomy 12/18. Transferred to [facility] for further management, now transferred to . Pt now decannulated and PEG removed, eating dysphagia diet, pending dc to acute rehab.     # LEukocytosis   resolved   infectious work up negative to date, UA positive, asymptomatic   pan Cx negative todate   CXR /CT Chest noted   hold antibiotics  podiatry eval appreciated     # CDIFF (+)   - s/p PO Vancomycin 2/1-2/25  - Stool O&P and GI PCR NGTD, c/w Lomotil QID   - GI eval appreciated   -stool more formed , no further diarrhea   - pending D/C to acute rehab, on hold due to repeat covid positive   - Isolation and quarantine per protocol , D/c Planing to rehab       # ARDS second to COVID vs Multi-bacterial PNA/ R Lung Abscess  - s/p Intubated on 11/23 and c/b multi-bacterial PNA and lung abscesses.   - s/p Tracheostomy on 12/18 by CTSx. Sutures out 1/1  - s/p Air leak noted and s/p Bivona with CTSx 12/31. s/p Downsized to 6DCT 2/25  - s/p Successfully decannulated on 3/1, Now on 2LNC saturating well  - Proventil and Chest PT q6hr PRN      # ARTEMIO s/p CRRT and HD   - ARTEMIO now resolved and no longer required HD.   - Hypokalemia in setting of diarrhea/ CDIFF. Monitor electrolytes.   - Monitor renal function and avoid nephrotoxins.       # Sepsis second to COVID vs Multi-bacterial PNA/ Bacteremia and R Lung Abscess vs CDIFF (+)   - COVID with superimposed multi-bacterial VAP vs aspiration PNA vs cavitary PNA.   - SCx (11/24) MSSA and BCx (11/27) with MSSA and Proteus Bacteremia  - SCx (12/1, 12/12 and 12/27) with Stenotrophomonas.   - SCx (1/9) with  Pseudomonas and Stenotrophomonas   - Bcx has been persistently negative from 12/1, and most recently 1/12   - CT CHEST 12/4 with right lung cavitation.   - s/p multiple antibiotics, but now completed Fortaz and Flagyl (1/12-1/16)  - RPT CT CHEST 1/28 with improvement of right lung abscess   - SCx with  Pseudomonas and Stenotrophomonas and s/p TED/ Fortaz (1/28-2/6)      # DMII   - Continue on ISS   - FS well controlled  - will D/C on metformin , A1C trending up       # Hx of DVT   - c/w Xarelto      # Contracted Wrists   - PT/OT working on strength   OMT at the bedside       # CODE STATUS - FULL CODE     # DISPO - PM&R recs ACUTE REHAB

## 2021-03-18 NOTE — PROGRESS NOTE ADULT - SUBJECTIVE AND OBJECTIVE BOX
DATE OF SERVICE: 03-18-21     Subjective: Patient seen and examined. No new events except as noted.   doing okay  D/c planing     REVIEW OF SYSTEMS:    CONSTITUTIONAL: No weakness, fevers or chills  EYES/ENT: No visual changes;  No vertigo or throat pain   NECK: No pain or stiffness  RESPIRATORY: No cough, wheezing, hemoptysis; No shortness of breath  CARDIOVASCULAR: No chest pain or palpitations  GASTROINTESTINAL: No abdominal or epigastric pain. No nausea, vomiting, or hematemesis; No diarrhea or constipation. No melena or hematochezia.  GENITOURINARY: No dysuria, frequency or hematuria  NEUROLOGICAL: No numbness or weakness  SKIN: No itching, burning, rashes, or lesions   All other review of systems is negative unless indicated above.    MEDICATIONS:  MEDICATIONS  (STANDING):  ALBUTerol    90 MICROgram(s) HFA Inhaler 2 Puff(s) Inhalation every 6 hours  amLODIPine   Tablet 5 milliGRAM(s) Oral daily  chlorhexidine 4% Liquid 1 Application(s) Topical daily  cholestyramine Powder (Sugar-Free) 4 Gram(s) Oral two times a day  clonazePAM  Tablet 0.5 milliGRAM(s) Oral daily  collagenase Ointment 1 Application(s) Topical two times a day  dextrose 40% Gel 15 Gram(s) Oral once  dextrose 5%. 1000 milliLiter(s) (50 mL/Hr) IV Continuous <Continuous>  dextrose 5%. 1000 milliLiter(s) (100 mL/Hr) IV Continuous <Continuous>  dextrose 5%. 1000 milliLiter(s) (75 mL/Hr) IV Continuous <Continuous>  dextrose 50% Injectable 25 Gram(s) IV Push once  dextrose 50% Injectable 12.5 Gram(s) IV Push once  dextrose 50% Injectable 25 Gram(s) IV Push once  glucagon  Injectable 1 milliGRAM(s) IntraMuscular once  insulin lispro (ADMELOG) corrective regimen sliding scale   SubCutaneous Before meals and at bedtime  lactobacillus acidophilus 1 Tablet(s) Oral two times a day  metoprolol tartrate 50 milliGRAM(s) Oral two times a day  multivitamin 1 Tablet(s) Oral daily  nystatin Powder 1 Application(s) Topical every 8 hours  pantoprazole    Tablet 40 milliGRAM(s) Oral before breakfast  rivaroxaban 20 milliGRAM(s) Oral with dinner  zinc sulfate 220 milliGRAM(s) Oral daily      PHYSICAL EXAM:  T(C): 36.9 (03-18-21 @ 17:37), Max: 36.9 (03-17-21 @ 21:31)  HR: 91 (03-18-21 @ 17:37) (91 - 100)  BP: 143/61 (03-18-21 @ 17:37) (140/77 - 147/60)  RR: 18 (03-18-21 @ 17:37) (16 - 18)  SpO2: 96% (03-18-21 @ 17:37) (92% - 97%)  Wt(kg): --  I&O's Summary        Appearance: Normal	  HEENT:  PERRLA   Lymphatic: No lymphadenopathy   Cardiovascular: Normal S1 S2, no JVD  Respiratory: normal effort , clear  Gastrointestinal:  Soft, Non-tender  Skin: No rashes,  warm to touch  Psychiatry:  Mood & affect appropriate  Musculuskeletal:edema, weknaess in extremities baseline       All labs, Imaging and EKGs personally reviewed                             12.1   7.72  )-----------( 422      ( 17 Mar 2021 07:54 )             40.4               03-17    140  |  105  |  9   ----------------------------<  128<H>  3.7   |  22  |  0.41<L>    Ca    10.0      17 Mar 2021 07:54  Phos  4.6     03-17  Mg     1.7     03-17

## 2021-03-18 NOTE — PROGRESS NOTE ADULT - ASSESSMENT
60 year old female with prolonged hospital course secondary to COVID 19, GI consulted for persistent diarrhea     Problem/Recommendation - 1:  Problem: COVID-19. Recommendation: status post treatment by infectious disease  now off of isolation.     Problem/Recommendation - 2:  ·  Problem: Leukocytosis: unclear etiology. Improved to 9 from 17 yesterday. No diarrhea, doubt c-diff  -trend WBC  -observe for any focal symptoms     Problem/Recommendation - 3:  ·  Problem: Diarrhea.  Recommendation: now improved, was most likely due to tube feeds. Now tolerating regular food.  -lomotil PRN     Problem/Recommendation - 4:  ·  Problem: Hernia, incisional.  Recommendation: reducible, nontender. No signs of incarceration.   -serial abdominal exams.      Problem/Recommendation - 5:  ·  Problem: Gastrostomy present.  Recommendation: patient tolerating good PO intake. Passed swallow eval. Gastrostomy removed at bedside and bandaged.     Problem/Recommendation - 6:  Problem: ARTEMIO (acute kidney injury). Recommendation: s/p CVVHD, now improved.     Problem/Recommendation - 7:  Problem: History of pressure ulcer. Recommendation: per [facility] team.     Problem/Recommendation - 8:  Problem: Clostridium difficile infection. 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 abdominal ultrasound showed hepatic steatosis, no biliary pathology  -periodic LFT measurements.    Attending Attestation:   Differential diagnosis and plan of care discussed with patient after the evaluation  65 Minutes spent on total encounter of which more than fifty percent of the encounter was spent counseling and/or coordinating care by the attending physician.      Ranjeet Ponce M.D.   Gastroenterology and Hepatology  Cell: [phone].

## 2021-03-19 ENCOUNTER — TRANSCRIPTION ENCOUNTER (OUTPATIENT)
Age: 61
End: 2021-03-19

## 2021-03-19 VITALS — DIASTOLIC BLOOD PRESSURE: 76 MMHG | SYSTOLIC BLOOD PRESSURE: 159 MMHG | HEART RATE: 102 BPM

## 2021-03-19 LAB
ANION GAP SERPL CALC-SCNC: 12 MMOL/L — SIGNIFICANT CHANGE UP (ref 7–14)
BASOPHILS # BLD AUTO: 0.08 K/UL — SIGNIFICANT CHANGE UP (ref 0–0.2)
BASOPHILS NFR BLD AUTO: 0.9 % — SIGNIFICANT CHANGE UP (ref 0–2)
BUN SERPL-MCNC: 10 MG/DL — SIGNIFICANT CHANGE UP (ref 7–23)
CALCIUM SERPL-MCNC: 9.5 MG/DL — SIGNIFICANT CHANGE UP (ref 8.4–10.5)
CHLORIDE SERPL-SCNC: 105 MMOL/L — SIGNIFICANT CHANGE UP (ref 98–107)
CO2 SERPL-SCNC: 22 MMOL/L — SIGNIFICANT CHANGE UP (ref 22–31)
CREAT SERPL-MCNC: 0.44 MG/DL — LOW (ref 0.5–1.3)
EOSINOPHIL # BLD AUTO: 0.53 K/UL — HIGH (ref 0–0.5)
EOSINOPHIL NFR BLD AUTO: 5.7 % — SIGNIFICANT CHANGE UP (ref 0–6)
GLUCOSE SERPL-MCNC: 124 MG/DL — HIGH (ref 70–99)
HCT VFR BLD CALC: 39.1 % — SIGNIFICANT CHANGE UP (ref 34.5–45)
HGB BLD-MCNC: 11.8 G/DL — SIGNIFICANT CHANGE UP (ref 11.5–15.5)
IANC: 6 K/UL — SIGNIFICANT CHANGE UP (ref 1.5–8.5)
IMM GRANULOCYTES NFR BLD AUTO: 0.5 % — SIGNIFICANT CHANGE UP (ref 0–1.5)
LYMPHOCYTES # BLD AUTO: 2.18 K/UL — SIGNIFICANT CHANGE UP (ref 1–3.3)
LYMPHOCYTES # BLD AUTO: 23.3 % — SIGNIFICANT CHANGE UP (ref 13–44)
MCHC RBC-ENTMCNC: 26.7 PG — LOW (ref 27–34)
MCHC RBC-ENTMCNC: 30.2 GM/DL — LOW (ref 32–36)
MCV RBC AUTO: 88.5 FL — SIGNIFICANT CHANGE UP (ref 80–100)
MONOCYTES # BLD AUTO: 0.51 K/UL — SIGNIFICANT CHANGE UP (ref 0–0.9)
MONOCYTES NFR BLD AUTO: 5.5 % — SIGNIFICANT CHANGE UP (ref 2–14)
NEUTROPHILS # BLD AUTO: 6 K/UL — SIGNIFICANT CHANGE UP (ref 1.8–7.4)
NEUTROPHILS NFR BLD AUTO: 64.1 % — SIGNIFICANT CHANGE UP (ref 43–77)
NRBC # BLD: 0 /100 WBCS — SIGNIFICANT CHANGE UP
NRBC # FLD: 0 K/UL — SIGNIFICANT CHANGE UP
PLATELET # BLD AUTO: 415 K/UL — HIGH (ref 150–400)
POTASSIUM SERPL-MCNC: 3.7 MMOL/L — SIGNIFICANT CHANGE UP (ref 3.5–5.3)
POTASSIUM SERPL-SCNC: 3.7 MMOL/L — SIGNIFICANT CHANGE UP (ref 3.5–5.3)
RBC # BLD: 4.42 M/UL — SIGNIFICANT CHANGE UP (ref 3.8–5.2)
RBC # FLD: 16.8 % — HIGH (ref 10.3–14.5)
SODIUM SERPL-SCNC: 139 MMOL/L — SIGNIFICANT CHANGE UP (ref 135–145)
WBC # BLD: 9.35 K/UL — SIGNIFICANT CHANGE UP (ref 3.8–10.5)
WBC # FLD AUTO: 9.35 K/UL — SIGNIFICANT CHANGE UP (ref 3.8–10.5)

## 2021-03-19 PROCEDURE — 99232 SBSQ HOSP IP/OBS MODERATE 35: CPT

## 2021-03-19 RX ADMIN — Medication 50 MILLIGRAM(S): at 06:00

## 2021-03-19 RX ADMIN — Medication 1 APPLICATION(S): at 06:00

## 2021-03-19 RX ADMIN — Medication 1 TABLET(S): at 11:41

## 2021-03-19 RX ADMIN — ALBUTEROL 2 PUFF(S): 90 AEROSOL, METERED ORAL at 06:00

## 2021-03-19 RX ADMIN — PANTOPRAZOLE SODIUM 40 MILLIGRAM(S): 20 TABLET, DELAYED RELEASE ORAL at 06:02

## 2021-03-19 RX ADMIN — ZINC SULFATE TAB 220 MG (50 MG ZINC EQUIVALENT) 220 MILLIGRAM(S): 220 (50 ZN) TAB at 11:41

## 2021-03-19 RX ADMIN — ALBUTEROL 2 PUFF(S): 90 AEROSOL, METERED ORAL at 10:00

## 2021-03-19 RX ADMIN — Medication 0.5 MILLIGRAM(S): at 11:42

## 2021-03-19 RX ADMIN — CHOLESTYRAMINE 4 GRAM(S): 4 POWDER, FOR SUSPENSION ORAL at 11:42

## 2021-03-19 RX ADMIN — NYSTATIN CREAM 1 APPLICATION(S): 100000 CREAM TOPICAL at 11:42

## 2021-03-19 RX ADMIN — Medication 1 TABLET(S): at 06:00

## 2021-03-19 RX ADMIN — AMLODIPINE BESYLATE 5 MILLIGRAM(S): 2.5 TABLET ORAL at 06:00

## 2021-03-19 RX ADMIN — CHLORHEXIDINE GLUCONATE 1 APPLICATION(S): 213 SOLUTION TOPICAL at 11:10

## 2021-03-19 NOTE — PROGRESS NOTE ADULT - ASSESSMENT
60 year old female with prolonged hospital course secondary to COVID 19, GI consulted for persistent diarrhea     Problem/Recommendation - 1:  Problem: COVID-19. Recommendation: status post treatment by infectious disease  now off of isolation.     Problem/Recommendation - 2:  ·  Problem: Leukocytosis: unclear etiology. Improved to 9 from 17 yesterday. No diarrhea, doubt c-diff  -trend WBC  -observe for any focal symptoms     Problem/Recommendation - 3:  ·  Problem: Diarrhea.  Recommendation: now improved, was most likely due to tube feeds. Now tolerating regular food.  -lomotil PRN     Problem/Recommendation - 4:  ·  Problem: Hernia, incisional.  Recommendation: reducible, nontender. No signs of incarceration.   -serial abdominal exams.      Problem/Recommendation - 5:  ·  Problem: Gastrostomy present.  Recommendation: patient tolerating good PO intake. Passed swallow eval. Gastrostomy removed at bedside and bandaged.     Problem/Recommendation - 6:  Problem: ARTEMIO (acute kidney injury). Recommendation: s/p CVVHD, now improved.     Problem/Recommendation - 7:  Problem: History of pressure ulcer. Recommendation: per [facility] team.     Problem/Recommendation - 8:  Problem: Clostridium difficile infection. 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 abdominal ultrasound showed hepatic steatosis, no biliary pathology  -periodic LFT measurements.    Attending Attestation:   Differential diagnosis and plan of care discussed with patient after the evaluation  65 Minutes spent on total encounter of which more than fifty percent of the encounter was spent counseling and/or coordinating care by the attending physician.      Ranjeet Ponce M.D.   Gastroenterology and Hepatology  Cell: [phone].

## 2021-03-19 NOTE — PROGRESS NOTE ADULT - SUBJECTIVE AND OBJECTIVE BOX
Chief Complaint:  Patient is a 60y old  Female who presents with a chief complaint of Transfer from [facility] (17 Mar 2021 13:32)      Interval Events:   no abd pain  no diarrhea    Allergies:  Kiwi (Unknown)  latex (Anaphylaxis)  latex (Unknown)  penicillin (Other)  penicillin (Unknown)  perfume  hives (Other)  potassium acetate (Other)  soap additives hives (Other)      Hospital Medications:  acetaminophen    Suspension .. 650 milliGRAM(s) Oral every 6 hours PRN  ALBUTerol    90 MICROgram(s) HFA Inhaler 2 Puff(s) Inhalation every 6 hours  amLODIPine   Tablet 5 milliGRAM(s) Oral daily  artificial tears (preservative free) Ophthalmic Solution 1 Drop(s) Both EYES every 8 hours PRN  chlorhexidine 4% Liquid 1 Application(s) Topical daily  cholestyramine Powder (Sugar-Free) 4 Gram(s) Oral two times a day  clonazePAM  Tablet 0.5 milliGRAM(s) Oral daily  collagenase Ointment 1 Application(s) Topical two times a day  dextrose 40% Gel 15 Gram(s) Oral once  dextrose 5%. 1000 milliLiter(s) IV Continuous <Continuous>  dextrose 5%. 1000 milliLiter(s) IV Continuous <Continuous>  dextrose 5%. 1000 milliLiter(s) IV Continuous <Continuous>  dextrose 50% Injectable 25 Gram(s) IV Push once  dextrose 50% Injectable 12.5 Gram(s) IV Push once  dextrose 50% Injectable 25 Gram(s) IV Push once  diphenoxylate/atropine 1 Tablet(s) Oral four times a day PRN  glucagon  Injectable 1 milliGRAM(s) IntraMuscular once  guaiFENesin   Syrup  (Sugar-Free) 100 milliGRAM(s) Oral every 6 hours PRN  insulin lispro (ADMELOG) corrective regimen sliding scale   SubCutaneous Before meals and at bedtime  lactobacillus acidophilus 1 Tablet(s) Oral two times a day  metoprolol tartrate 50 milliGRAM(s) Oral two times a day  multivitamin 1 Tablet(s) Oral daily  nystatin Powder 1 Application(s) Topical every 8 hours  pantoprazole  Injectable 40 milliGRAM(s) IV Push daily  rivaroxaban 20 milliGRAM(s) Oral with dinner  zinc sulfate 220 milliGRAM(s) Oral daily      PMHX/PSHX:  Pneumomediastinum    Umbilical hernia    Osteoarthritis of both knees, unspecified osteoarthritis type    AARON (Obstructive Sleep Apnea)    Pneumonia    S/P Colon Resection    pt with allergies to perfumes and addidtive to soaps, requires hypoallergenic sheets and gowns, line    Cervical Dysplasia    Latex Sensitivity    GERD (Gastroesophageal Reflux Disease)    Asthma    Anemia    S/P Joint Replacement    DVT (Deep Venous Thrombosis)    HTN (Hypertension)    H/O ileostomy    S/P LEEP    S/P Exploratory Laparotomy    S/P Colonoscopy    S/P Endoscopy    S/P  Section    History of Tubal Ligation    S/P Knee Surgery    Umbilical Hernia    Osteoarthritis of Knee    HTN (Hypertension)        Family history:  Family history of gastric cancer    Family history of breast cancer (Grandparent)        ROS:     General:  No wt loss, fevers, chills, night sweats, fatigue,   Eyes:  Good vision, no reported pain  ENT:  No sore throat, pain, runny nose, dysphagia  CV:  No pain, palpitations, hypo/hypertension  Resp:  No dyspnea, cough, tachypnea, wheezing  GI:  See HPI  :  No pain, bleeding, incontinence, nocturia  Muscle:  No pain, weakness  Neuro:  No weakness, tingling, memory problems  Psych:  No fatigue, insomnia, mood problems, depression  Endocrine:  No polyuria, polydipsia, cold/heat intolerance  Heme:  No petechiae, ecchymosis, easy bruisability  Skin:  No rash, edema      PHYSICAL EXAM:     GENERAL:  Appears stated age, well-groomed, well-nourished, no distress  HEENT:  NC/AT,  conjunctivae clear, sclera-anicteric  NECK: Trachea midline, supple  CHEST:  Full & symmetric excursion, no increased effort, breath sounds clear  HEART:  Regular rhythm, no lola/heave  ABDOMEN:  Soft, non-tender, non-distended, normoactive bowel sounds,  no masses ,no hepato-splenomegaly,   EXTREMITIES:  no cyanosis,clubbing or edema  SKIN:  No rash/erythema/petechiae, no jaundice  NEURO:  Alert, oriented, no asterixis  RECTAL: Deferred    Labs personally reviewed  Vital Signs Last 24 Hrs  T(C): 36.9 (18 Mar 2021 17:37), Max: 36.9 (18 Mar 2021 17:37)  T(F): 98.4 (18 Mar 2021 17:37), Max: 98.4 (18 Mar 2021 17:37)  HR: 91 (18 Mar 2021 17:37) (91 - 100)  BP: 143/61 (18 Mar 2021 17:37) (140/77 - 146/83)  BP(mean): --  RR: 18 (18 Mar 2021 17:37) (16 - 18)  SpO2: 96% (18 Mar 2021 17:37) (95% - 97%)    CBC Full  -  ( 17 Mar 2021 07:54 )  WBC Count : 7.72 K/uL  RBC Count : 4.51 M/uL  Hemoglobin : 12.1 g/dL  Hematocrit : 40.4 %  Platelet Count - Automated : 422 K/uL  Mean Cell Volume : 89.6 fL  Mean Cell Hemoglobin : 26.8 pg  Mean Cell Hemoglobin Concentration : 30.0 gm/dL  Auto Neutrophil # : x  Auto Lymphocyte # : x  Auto Monocyte # : x  Auto Eosinophil # : x  Auto Basophil # : x  Auto Neutrophil % : x  Auto Lymphocyte % : x  Auto Monocyte % : x  Auto Eosinophil % : x  Auto Basophil % : x          140  |  105  |  9   ----------------------------<  128<H>  3.7   |  22  |  0.41<L>    Ca    10.0      17 Mar 2021 07:54  Phos  4.6       Mg     1.7

## 2021-03-19 NOTE — DISCHARGE NOTE NURSING/CASE MANAGEMENT/SOCIAL WORK - PATIENT PORTAL LINK FT
You can access the FollowMyHealth Patient Portal offered by [facility] by registering at the following website: [email or web address]. By joining [employer]’s FollowMyHealth portal, you will also be able to view your health information using other applications (apps) compatible with our system.

## 2021-03-19 NOTE — PROGRESS NOTE ADULT - ASSESSMENT
59 YO F with PMHx of HTN, DVT on Xarelto, Peritonitis s/p Oral's Procedure and Ileostomy (reversed in 2011) and AARON who presented to [facility] on 11/18 for AHRF second to COVID 19, intubated on 11/23 complicated by lung abscesses on CT CHEST 12/5, multi-bacterial PNA and bacteremia, ARTEMIO s/p CRRT and HD, and s/p Tracheostomy 12/18. Transferred to [facility] for further management, now transferred to . Pt now decannulated and PEG removed, eating dysphagia diet, pending dc to acute rehab.     # LEukocytosis   resolved   infectious work up negative to date, UA positive, asymptomatic   pan Cx negative todate   CXR /CT Chest noted   hold antibiotics  podiatry eval appreciated     # CDIFF (+)   - s/p PO Vancomycin 2/1-2/25  - Stool O&P and GI PCR NGTD, c/w Lomotil QID   - GI eval appreciated   -stool more formed , no further diarrhea   - pending D/C to acute rehab, on hold due to repeat covid positive   - Isolation and quarantine per protocol , D/c Planing to rehab       # ARDS second to COVID vs Multi-bacterial PNA/ R Lung Abscess  - s/p Intubated on 11/23 and c/b multi-bacterial PNA and lung abscesses.   - s/p Tracheostomy on 12/18 by CTSx. Sutures out 1/1  - s/p Air leak noted and s/p Bivona with CTSx 12/31. s/p Downsized to 6DCT 2/25  - s/p Successfully decannulated on 3/1, Now on 2LNC saturating well  - Proventil and Chest PT q6hr PRN      # ARTEMIO s/p CRRT and HD   - ARTEMIO now resolved and no longer required HD.   - Hypokalemia in setting of diarrhea/ CDIFF. Monitor electrolytes.   - Monitor renal function and avoid nephrotoxins.       # Sepsis second to COVID vs Multi-bacterial PNA/ Bacteremia and R Lung Abscess vs CDIFF (+)   - COVID with superimposed multi-bacterial VAP vs aspiration PNA vs cavitary PNA.   - SCx (11/24) MSSA and BCx (11/27) with MSSA and Proteus Bacteremia  - SCx (12/1, 12/12 and 12/27) with Stenotrophomonas.   - SCx (1/9) with  Pseudomonas and Stenotrophomonas   - Bcx has been persistently negative from 12/1, and most recently 1/12   - CT CHEST 12/4 with right lung cavitation.   - s/p multiple antibiotics, but now completed Fortaz and Flagyl (1/12-1/16)  - RPT CT CHEST 1/28 with improvement of right lung abscess   - SCx with  Pseudomonas and Stenotrophomonas and s/p TED/ Fortaz (1/28-2/6)      # DMII   - Continue on ISS   - FS well controlled  - will D/C on metformin , A1C trending up       # Hx of DVT   - c/w Xarelto      # Contracted Wrists   - PT/OT working on strength   OMT at the bedside       # CODE STATUS - FULL CODE     # DISPO - PM&R recs ACUTE REHAB

## 2021-03-19 NOTE — PROGRESS NOTE ADULT - REASON FOR ADMISSION
Transfer from [facility]
Transfer from [facility]
Transfer from [facility]
Transfer from [facility]
Transfer from [facility] from [facility]
Transfer from [facility]
Transfer from [facility]
Transfer from [facility]
Transfer from [facility]
Transfer from [facility]
Transfer from [facility]
Transfer [facility]
Transfer [facility] from [facility]
Transfer from [facility]
Transfer from [facility]
Transfer from [facility]
Transfer from [facility]
Transfer from [facility] from [facility]
Transfer from [facility]
Transfer from [facility]
Transfer from [facility]
Transfer from [facility]
Transfer from [facility]
Transfer from [facility] from [facility]
Transfer from [facility]
hypoxemic respiratory failure
Transfer from [facility]
Transfer from [facility]
Transfer from [facility]
Transfer from [facility]
Transfer from [facility]
Transfer from [facility]
Transfer from [facility]
Transfer from [facility]
Transfer from [facility]
Transfer from [facility]
Transfer from [facility]
Transfer from [facility]
Transfer from [facility]
Transfer from [facility]
Transfer from [facility] from [facility]
Transfer from [facility]
Transfer from [facility]
Transfer from [facility]
Transfer from [facility]
Transfer from [facility]
Transfer from [facility]
Transfer from [facility]
Transfer from [facility]
Transfer from [facility]
Transfer from [facility] from [facility]
Transfer from [facility]
Transfer from [facility]
Transfer [facility]
Transfer from [facility]
Transfer from [facility]
Transfer from [facility]
Transfer from [facility] from [facility]
Transfer from [facility]
Transfer from [facility] from [facility]
Transfer from [facility]
Transfer from [facility]
Transfer from [facility]
Transfer from [facility]
Transfer [facility]
Transfer from [facility]
Transfer from [facility]
Transfer from [facility]
Transfer from [facility]
Transfer from [facility]
Transfer from [facility]
Transfer from [facility]
Transfer from [facility]
Transfer from [facility]
Transfer from [facility]
Transfer from [facility]
Transfer from [facility]
Transfer from [facility]
Transfer from [facility]
Transfer from [facility]
Transfer from [facility]
Transfer from [facility]
Transfer from [facility]
Transfer from [facility]
Transfer from [facility]
Transfer from [facility]
Transfer from [facility]
Transfer from [facility]
Transfer from [facility]
Transfer from [facility] from [facility]
Transfer from [facility]
Transfer from [facility]
Transfer from [facility]
Transfer from [facility]
Transfer from [facility]
Transfer from [facility]
Transfer from [facility]
Transfer from [facility] from [facility]
Transfer from [facility]
Transfer from [facility]
Transfer from [facility]
Transfer from [facility]
Transfer from [facility]
Transfer from [facility]
Transfer from [facility]
Transfer from [facility]
Transfer from [facility]
Transfer from [facility]
Transfer from [facility] from [facility]
Transfer from [facility] from [facility] from [facility]
Transfer from [facility]
Transfer from [facility] from [facility]
Transfer from [facility]
COVID-19
Transfer from [facility]
Transfer from [facility] from [facility]

## 2021-03-19 NOTE — PROGRESS NOTE ADULT - SUBJECTIVE AND OBJECTIVE BOX
Patient is a 60y old  Female who presents with a chief complaint of Transfer from [facility] (18 Mar 2021 22:02)      HPI:  Patient is a 59yo F w/ AARON, peritonitis s/p Oral's procedure and ileostomy (reversed ), HTN, prior DVT/PE, ?Asthma admitted on 2020 to [facility] after presenting for  productive cough  w/ SOB x9 days and diarrhea x7 days and fever x1 day. Patient was found to be COVID + on 2020 and completed remdesivir -. Patient was intubated for airway protection on 2020. Patient has required proning (last 2020). Hospital course c/b by ATN, requiring CVVHD now on intermittent HD, unresponsive to bumex challenge last HD . Course also c/b Stenotrophomonas (, completed Levaquin x7 days), MSSA/P. mirablis bacteremia (on Cefazolin, potentially due to urine vs line-associated and MSSA in sputum due to PNA), also with lung abscesses on CT chest on .  Patient currently on Volume AC 30/350/8/35% on Precedex. Patient intermittently opens eyes but is not yet responsive. Transferred over to [facility] on 12/10 to offload [facility] COVID ICU    (10 Dec 2020 14:13)      REVIEW OF SYSTEMS  Constitutional - No fever, No weight loss, No fatigue  HEENT - No eye pain, No visual disturbances, No difficulty hearing, No tinnitus, No vertigo, No neck pain  Respiratory - No cough, No wheezing, No shortness of breath  Cardiovascular - No chest pain, No palpitations  Gastrointestinal - No abdominal pain, No nausea, No vomiting, No diarrhea, No constipation  Genitourinary - No dysuria, No frequency, No hematuria, No incontinence  Neurological - No headaches, No memory loss, + loss of strength, No numbness, No tremors  Skin - No itching, No rashes, No lesions   Endocrine - No temperature intolerance  Musculoskeletal - No joint pain, No joint swelling, No muscle pain  Psychiatric - No depression, No anxiety    PAST MEDICAL & SURGICAL HISTORY  Pneumomediastinum    Umbilical hernia    Osteoarthritis of both knees, unspecified osteoarthritis type    AARON (Obstructive Sleep Apnea)    Pneumonia    S/P Colon Resection    pt with allergies to perfumes and addidtive to soaps, requires hypoallergenic sheets and gowns, line    Cervical Dysplasia    Latex Sensitivity    GERD (Gastroesophageal Reflux Disease)    Asthma    Anemia    S/P Joint Replacement    DVT (Deep Venous Thrombosis)    HTN (Hypertension)    H/O ileostomy    S/P LEEP    S/P Exploratory Laparotomy    S/P Colonoscopy    S/P Endoscopy    S/P  Section    History of Tubal Ligation    S/P Knee Surgery    Umbilical Hernia    Osteoarthritis of Knee    HTN (Hypertension)        CURRENT FUNCTIONAL STATUS  3/17:   Bed Mobility  Bed Mobility Training Sit-to-Supine: minimum assist (75% patient effort);  1 person assist;  verbal cues  Bed Mobility Training Supine-to-Sit: minimum assist (75% patient effort);  1 person assist;  verbal cues  Bed Mobility Training Limitations: decreased strength    Sit-Stand Transfer Training  Transfer Training Sit-to-Stand Transfer: maximum assist (25% patient effort);  1 person assist;  verbal cues;  full weight-bearing  Transfer Training Stand-to-Sit Transfer: maximum assist (25% patient effort);  1 person assist;  verbal cues;  full weight-bearing  Sit-to-Stand Transfer Training Transfer Safety Analysis: decreased strength        FAMILY HISTORY   Family history of gastric cancer    Family history of breast cancer (Grandparent)        RECENT LABS/IMAGING  CBC Full  -  ( 19 Mar 2021 05:45 )  WBC Count : 9.35 K/uL  RBC Count : 4.42 M/uL  Hemoglobin : 11.8 g/dL  Hematocrit : 39.1 %  Platelet Count - Automated : 415 K/uL  Mean Cell Volume : 88.5 fL  Mean Cell Hemoglobin : 26.7 pg  Mean Cell Hemoglobin Concentration : 30.2 gm/dL  Auto Neutrophil # : 6.00 K/uL  Auto Lymphocyte # : 2.18 K/uL  Auto Monocyte # : 0.51 K/uL  Auto Eosinophil # : 0.53 K/uL  Auto Basophil # : 0.08 K/uL  Auto Neutrophil % : 64.1 %  Auto Lymphocyte % : 23.3 %  Auto Monocyte % : 5.5 %  Auto Eosinophil % : 5.7 %  Auto Basophil % : 0.9 %        139  |  105  |  10  ----------------------------<  124<H>  3.7   |  22  |  0.44<L>    Ca    9.5      19 Mar 2021 05:45          VITALS  T(C): 36.7 (21 @ 11:43), Max: 36.9 (21 @ 17:37)  HR: 91 (21 @ 11:43) (90 - 91)  BP: 157/74 (21 @ 11:43) (143/61 - 157/74)  RR: 16 (21 @ 11:43) (16 - 18)  SpO2: 96% (21 @ 11:43) (96% - 97%)  Wt(kg): --    ALLERGIES  Kiwi (Unknown)  latex (Anaphylaxis)  latex (Unknown)  penicillin (Other)  penicillin (Unknown)  perfume  hives (Other)  potassium acetate (Other)  soap additives hives (Other)      MEDICATIONS   acetaminophen    Suspension .. 650 milliGRAM(s) Oral every 6 hours PRN  ALBUTerol    90 MICROgram(s) HFA Inhaler 2 Puff(s) Inhalation every 6 hours  amLODIPine   Tablet 5 milliGRAM(s) Oral daily  artificial tears (preservative free) Ophthalmic Solution 1 Drop(s) Both EYES every 8 hours PRN  chlorhexidine 4% Liquid 1 Application(s) Topical daily  cholestyramine Powder (Sugar-Free) 4 Gram(s) Oral two times a day  clonazePAM  Tablet 0.5 milliGRAM(s) Oral daily  collagenase Ointment 1 Application(s) Topical two times a day  dextrose 40% Gel 15 Gram(s) Oral once  dextrose 5%. 1000 milliLiter(s) IV Continuous <Continuous>  dextrose 5%. 1000 milliLiter(s) IV Continuous <Continuous>  dextrose 5%. 1000 milliLiter(s) IV Continuous <Continuous>  dextrose 50% Injectable 25 Gram(s) IV Push once  dextrose 50% Injectable 12.5 Gram(s) IV Push once  dextrose 50% Injectable 25 Gram(s) IV Push once  glucagon  Injectable 1 milliGRAM(s) IntraMuscular once  guaiFENesin   Syrup  (Sugar-Free) 100 milliGRAM(s) Oral every 6 hours PRN  insulin lispro (ADMELOG) corrective regimen sliding scale   SubCutaneous Before meals and at bedtime  lactobacillus acidophilus 1 Tablet(s) Oral two times a day  metoprolol tartrate 50 milliGRAM(s) Oral two times a day  multivitamin 1 Tablet(s) Oral daily  nystatin Powder 1 Application(s) Topical every 8 hours  pantoprazole    Tablet 40 milliGRAM(s) Oral before breakfast  rivaroxaban 20 milliGRAM(s) Oral with dinner  zinc sulfate 220 milliGRAM(s) Oral daily      ----------------------------------------------------------------------------------------   PHYSICAL EXAM  Constitutional - NAD, Comfortable   HEENT - NCAT, EOMI, dressing over prior trach site  Chest - no respiratory distress  Cardiovascular - RRR, S1S2   Abdomen -  Soft, NTND   Extremities - no calf tenderness  + L hand swelling  Neurologic Exam -                    Cognitive - Awake, Alert, AAO to self, place, date, year, situation     Communication - Fluent, No dysarthria     Cranial Nerves - CN 2-12 intact     Motor -                      LEFT    UE - ShAB 4/5, EF 3/5, EE 3/5, WE 0/5,  1/5                    RIGHT UE - ShAB 4/5, EF 2/5, EE 3/5, WE 1/5,  2/5                    LEFT    LE - HF 3/5, KE 4/5, DF 3/5, PF 4/5                    RIGHT LE - HF 4/5, KE 4/5, DF 4/5, PF 4/5        Sensory - Intact to LT         Balance - WNL Static  Psychiatric - Mood stable, Affect WNL  ----------------------------------------------------------------------------------------  ASSESSMENT/PLAN  Patient is a 59yo F w/ AARON, peritonitis s/p Oral's procedure and ileostomy (reversed ), HTN, prior DVT/PE, ?Asthma admitted on 2020 to [facility],  transferred to [facility] for AHRF second to COVID 19, intubated on  complicated by R lung abscesses, multibacterial PNA and bacteremia, ARTEMIO s/p CRRT and HD, and s/p Tracheostomy .  leukocytosis improved.  on Klonopin for anxiety per behavioral health  Pain - tylenol prn  htn: norvasc, lopressor  DVT PPX - rivaroxaban 20 milliGRAM(s) Oral with dinner  Diet:  dysphagia 2 mech soft thin liquids  continue bedside PT and OT, bed mobility, transfers, ambulation with ad, adls, bracing  SLP  OOB as tolerates  turn and position q 2  Rehab -     Recommend acute inpatient rehab when medically cleared  patient can tolerate 3 hrs/day of therapy

## 2021-03-19 NOTE — PROGRESS NOTE ADULT - SUBJECTIVE AND OBJECTIVE BOX
DATE OF SERVICE: 03-19-21 @ 17:53    Subjective: Patient seen and examined. No new events except as noted.   doing well  D/C planing         MEDICATIONS:  MEDICATIONS  (STANDING):  ALBUTerol    90 MICROgram(s) HFA Inhaler 2 Puff(s) Inhalation every 6 hours  amLODIPine   Tablet 5 milliGRAM(s) Oral daily  chlorhexidine 4% Liquid 1 Application(s) Topical daily  cholestyramine Powder (Sugar-Free) 4 Gram(s) Oral two times a day  clonazePAM  Tablet 0.5 milliGRAM(s) Oral daily  collagenase Ointment 1 Application(s) Topical two times a day  dextrose 40% Gel 15 Gram(s) Oral once  dextrose 5%. 1000 milliLiter(s) (50 mL/Hr) IV Continuous <Continuous>  dextrose 5%. 1000 milliLiter(s) (100 mL/Hr) IV Continuous <Continuous>  dextrose 5%. 1000 milliLiter(s) (75 mL/Hr) IV Continuous <Continuous>  dextrose 50% Injectable 25 Gram(s) IV Push once  dextrose 50% Injectable 12.5 Gram(s) IV Push once  dextrose 50% Injectable 25 Gram(s) IV Push once  glucagon  Injectable 1 milliGRAM(s) IntraMuscular once  insulin lispro (ADMELOG) corrective regimen sliding scale   SubCutaneous Before meals and at bedtime  lactobacillus acidophilus 1 Tablet(s) Oral two times a day  metoprolol tartrate 50 milliGRAM(s) Oral two times a day  multivitamin 1 Tablet(s) Oral daily  nystatin Powder 1 Application(s) Topical every 8 hours  pantoprazole    Tablet 40 milliGRAM(s) Oral before breakfast  rivaroxaban 20 milliGRAM(s) Oral with dinner  zinc sulfate 220 milliGRAM(s) Oral daily      PHYSICAL EXAM:  T(C): 36.7 (03-19-21 @ 11:43), Max: 36.8 (03-19-21 @ 05:58)  HR: 91 (03-19-21 @ 11:43) (90 - 91)  BP: 157/74 (03-19-21 @ 11:43) (147/72 - 157/74)  RR: 16 (03-19-21 @ 11:43) (16 - 18)  SpO2: 96% (03-19-21 @ 11:43) (96% - 97%)  Wt(kg): --  I&O's Summary        Appearance: Normal	  HEENT:  PERRLA   Lymphatic: No lymphadenopathy   Cardiovascular: Normal S1 S2, no JVD  Respiratory: normal effort , clear  Gastrointestinal:  Soft, Non-tender  Skin: No rashes,  warm to touch  Psychiatry:  Mood & affect appropriate  Musculuskeletal: LE edema       All labs, Imaging and EKGs personally reviewed                               11.8   9.35  )-----------( 415      ( 19 Mar 2021 05:45 )             39.1               03-19    139  |  105  |  10  ----------------------------<  124<H>  3.7   |  22  |  0.44<L>    Ca    9.5      19 Mar 2021 05:45

## 2021-03-19 NOTE — CHART NOTE - NSCHARTNOTEFT_GEN_A_CORE
Source: Patient [X]    Family [ ]     other [X] chart    Diet : Diet, Dysphagia 2 Mechanical Soft-Thin Liquids (03-01-21 @ 13:46)    Nutrition follow up assessment. Per chart: patient is a 59 y/o F with PMHx of HTN, DVT on Xarelto, Peritonitis s/p Oral's Procedure and Ileostomy (reversed in 2011) and AARON who presented to [facility] on 11/18 for AHRF second to COVID 19, intubated on 11/23 complicated by lung abscesses on CT CHEST 12/5, multi-bacterial PNA and bacteremia, ARTEMIO s/p CRRT and HD, and s/p Tracheostomy 12/18. Transferred to [facility] for further management, now transferred to . Pt now decannulated and PEG removed, eating dysphagia diet, pending dc to acute rehab.   Per patient, appetite remains good at this time with PO % at meals, tolerated current diet with no chewing/swallowing difficulties at meals reported. No N/V/C/D at this time, last BM 3/18. No new food preferences at this time. Planning to transfer to acute rehab today.    Patient reports [ ] nausea  [ ] vomiting [ ] diarrhea [ ] constipation  [ ]chewing problems [ ] swallowing issues  [ ] other:      PO intake:  [ ]< 50% [ ] 50-75% [X] % [ ]  other :      Stated UBW 250lb/113.6kg  Weight trend per record: 115kg 12/10, 115.4kg 12/17, 128.3kg 12/19, 126.4kg 12/30, 113kg 1/20, 111.5 2/7, 115kg 12/10, 109.2kg 2/16, 101kg 3/2, 88kg 3/12, 93.7kg 3/15  No new wt to reassess; noted wt trend likely reflective of edema status.    Edema: generalized 2+ edema -- improved from 3+ from 3/15 RD's note.    Skin: pressure injury to chin from prone per flowsheet      Pertinent Medications: MEDICATIONS  (STANDING):  ALBUTerol    90 MICROgram(s) HFA Inhaler 2 Puff(s) Inhalation every 6 hours  amLODIPine   Tablet 5 milliGRAM(s) Oral daily  chlorhexidine 4% Liquid 1 Application(s) Topical daily  cholestyramine Powder (Sugar-Free) 4 Gram(s) Oral two times a day  clonazePAM  Tablet 0.5 milliGRAM(s) Oral daily  collagenase Ointment 1 Application(s) Topical two times a day  dextrose 40% Gel 15 Gram(s) Oral once  dextrose 5%. 1000 milliLiter(s) (50 mL/Hr) IV Continuous <Continuous>  dextrose 5%. 1000 milliLiter(s) (100 mL/Hr) IV Continuous <Continuous>  dextrose 5%. 1000 milliLiter(s) (75 mL/Hr) IV Continuous <Continuous>  dextrose 50% Injectable 25 Gram(s) IV Push once  dextrose 50% Injectable 12.5 Gram(s) IV Push once  dextrose 50% Injectable 25 Gram(s) IV Push once  glucagon  Injectable 1 milliGRAM(s) IntraMuscular once  insulin lispro (ADMELOG) corrective regimen sliding scale   SubCutaneous Before meals and at bedtime  lactobacillus acidophilus 1 Tablet(s) Oral two times a day  metoprolol tartrate 50 milliGRAM(s) Oral two times a day  multivitamin 1 Tablet(s) Oral daily  nystatin Powder 1 Application(s) Topical every 8 hours  pantoprazole    Tablet 40 milliGRAM(s) Oral before breakfast  rivaroxaban 20 milliGRAM(s) Oral with dinner  zinc sulfate 220 milliGRAM(s) Oral daily    MEDICATIONS  (PRN):  acetaminophen    Suspension .. 650 milliGRAM(s) Oral every 6 hours PRN Temp greater or equal to 38C (100.4F), Mild Pain (1 - 3), Moderate Pain (4 - 6)  artificial tears (preservative free) Ophthalmic Solution 1 Drop(s) Both EYES every 8 hours PRN Dry Eyes  guaiFENesin   Syrup  (Sugar-Free) 100 milliGRAM(s) Oral every 6 hours PRN Cough    Pertinent Labs:  03-19 Na139 mmol/L Glu 124 mg/dL<H> K+ 3.7 mmol/L Cr  0.44 mg/dL<L> BUN 10 mg/dL 03-17 Phos 4.6 mg/dL<H> 03-15 Alb 3.3 g/dL      Estimated Needs:   [X] no change since previous assessment  [ ] recalculated:       Previous Nutrition Diagnosis: Severe malnutrition    Nutrition Diagnosis is [X] ongoing  [ ] resolved [ ] not applicable          Recommendations:   1. Continue current diet consistency per MD order, which remains appropriate at this time.   2. Suggest add Glucerna Therapeutic Nutrition Shake 240mls 1x daily (220kcals, 10g protein).   3. Monitor weights, labs, BM's, skin integrity, PO intake/tolerance.   4. Encourage po intake, assist with meals and menu selections, provide alternatives PRN.

## 2021-03-19 NOTE — CHART NOTE - NSCHARTNOTESELECT_GEN_ALL_CORE
Event Note
Event Note
Follow-up/Nutrition Services
MICU Transfer Note/Transfer Note
Thoracic Surgery/Event Note
Thoracic Surgery/Event Note
Thoracic surgery
VIR/Off Service Note
Bronchoscopy/Event Note
Event Note
FOLLOW UP/Nutrition Services
Follow  up/Nutrition Services
Follow Up Note/Nutrition Services
Follow-Up/Nutrition Services
Medicine ACP/Event Note
Nutrition Follow/Up Note/Nutrition Services
Thoracic Surgery Note/Event Note
Thoracic Surgery/Event Note
consent
trach changed to 6 cuffed shiley/Event Note

## 2021-05-25 NOTE — PHYSICAL THERAPY INITIAL EVALUATION ADULT - IMPAIRMENTS FOUND, PT EVAL
[General Appearance - Alert] : alert
[General Appearance - In No Acute Distress] : in no acute distress
[Oriented To Time, Place, And Person] : oriented to person, place, and time
[Impaired Insight] : insight and judgment were intact
[Affect] : the affect was normal
[Person] : oriented to person
aerobic capacity/endurance/arousal, attention, and cognition/gait, locomotion, and balance/muscle strength
[Place] : oriented to place
[Time] : oriented to time
[Short Term Intact] : short term memory intact
[Remote Intact] : remote memory intact
[Span Intact] : the attention span was normal
[Concentration Intact] : normal concentrating ability
[Fluency] : fluency intact
[Comprehension] : comprehension intact
[Current Events] : adequate knowledge of current events
[Past History] : adequate knowledge of personal past history
[Vocabulary] : adequate range of vocabulary
[Cranial Nerves Optic (II)] : visual acuity intact bilaterally,  pupils equal round and reactive to light
[Cranial Nerves Oculomotor (III)] : extraocular motion intact
[Cranial Nerves Trigeminal (V)] : facial sensation intact symmetrically
[Cranial Nerves Facial (VII)] : face symmetrical
aerobic capacity/endurance/gait, locomotion, and balance/muscle strength/ROM
[Cranial Nerves Vestibulocochlear (VIII)] : hearing was intact bilaterally
[Cranial Nerves Glossopharyngeal (IX)] : tongue and palate midline
[Cranial Nerves Accessory (XI - Cranial And Spinal)] : head turning and shoulder shrug symmetric
[Cranial Nerves Hypoglossal (XII)] : there was no tongue deviation with protrusion
[Motor Strength] : muscle strength was normal in all four extremities
[No Muscle Atrophy] : normal bulk in all four extremities
[Motor Handedness Right-Handed] : the patient is right hand dominant
[Sensation Tactile Decrease] : light touch was intact
[Romberg's Sign] : Romberg's sign was negtive
[Balance] : balance was intact
[Limited Balance] : balance was intact
[Past-pointing] : there was no past-pointing
[Tremor] : no tremor present
[2+] : Patella left 2+
[No Visual Abnormalities] : no visible abnormalities
[Normal Lordosis] : normal lordosis
[No Scoliosis] : no scoliosis
[No Tenderness to Palpation] : no spine tenderness on palpation
[No Masses] : no masses
[Full ROM] : full ROM
[No Pain with ROM] : no pain with motion in any direction
[Sclera] : the sclera and conjunctiva were normal
[PERRL With Normal Accommodation] : pupils were equal in size, round, reactive to light, with normal accommodation
[Extraocular Movements] : extraocular movements were intact
[Outer Ear] : the ears and nose were normal in appearance
[Oropharynx] : the oropharynx was normal
[Neck Appearance] : the appearance of the neck was normal
[Neck Cervical Mass (___cm)] : no neck mass was observed
[Jugular Venous Distention Increased] : there was no jugular-venous distention
[Thyroid Diffuse Enlargement] : the thyroid was not enlarged
[Thyroid Nodule] : there were no palpable thyroid nodules
[Abnormal Walk] : normal gait
[Nail Clubbing] : no clubbing  or cyanosis of the fingernails
[Musculoskeletal - Swelling] : no joint swelling seen
[Motor Tone] : muscle strength and tone were normal
[Skin Color & Pigmentation] : normal skin color and pigmentation
[Skin Turgor] : normal skin turgor
[] : no rash

## 2021-06-10 NOTE — ED ADULT NURSE REASSESSMENT NOTE - TEMPLATE LIST FOR HEAD TO TOE ASSESSMENT
Respiratory This patient was contacted and a message was left to contact the clinic to schedule with Dr. Thompson.

## 2021-06-25 NOTE — ED ADULT NURSE NOTE - DOES PATIENT HAVE ADVANCE DIRECTIVE
unknown
[facility]ne[facility]st[facility]  Gynceology/OB  [address] [place] [address]  Phone: [phone]  Fax:

## 2021-09-08 NOTE — PROGRESS NOTE ADULT - SKIN
warm and dry detailed exam Pt received supine NAD . Wife at bedside. Pt c/o 2/10 pain at rest and with walking.

## 2021-09-10 NOTE — PATIENT PROFILE ADULT - NSTRANSFERBELONGINGSDISPO_GEN_A_NUR
with patient Cimzia Counseling:  I discussed with the patient the risks of Cimzia including but not limited to immunosuppression, allergic reactions and infections.  The patient understands that monitoring is required including a PPD at baseline and must alert us or the primary physician if symptoms of infection or other concerning signs are noted.

## 2021-11-11 NOTE — PROGRESS NOTE ADULT - PROBLEM SELECTOR PLAN 4
[Complete] : complete cerumen impaction [Effusion] : effusion [Exposed Vessel] : left anterior vessel not exposed -Possible exacerbation contributing to her symptoms, significantly improved with bipap and lasix therefore more likely due to pulm edema, afebrile, no fevers or chills at home, small amount of white sputum production at home  - RVP negative  - c/w azithro 5 day course for CAP  - will c/w duonebs q6 for now  - blood cultures NGTD  - will f/u with CM as patient needs new CPAP machine at home [2+] : 2+ [Increased Work of Breathing] : no increased work of breathing with use of accessory muscles and retractions [Normal Gait and Station] : normal gait and station [Normal muscle strength, symmetry and tone of facial, head and neck musculature] : normal muscle strength, symmetry and tone of facial, head and neck musculature [Normal] : no cervical lymphadenopathy [de-identified] : purulent effusion [de-identified] : serous effusion erythematous TM -Possible exacerbation contributing to her symptoms, significantly improved with BiPAP and Lasix therefore more likely due to pulm edema, afebrile, no fevers or chills at home, small amount of white sputum production at home  - RVP negative  - c/w azithro 5 day course for CAP  - will c/w duonebs q6 for now  - blood cultures NGTD  - will f/u with CM as patient needs new CPAP machine at home

## 2022-07-05 NOTE — DISCHARGE NOTE PROVIDER - EXTENDED VTE YES NO FOR MLM ASPIRIN
PEDIATRIC TRANSPLANT CARDIOLOGY NOTE    07/05/2022    Cruzito Ann MD  [address] [facility] [address]    Dear Dr. Ann:    CHIEF COMPLAINT: Orthotopic heart transplant    HISTORY OF PRESENT ILLNESS: James is a 18 y.o. 6 m.o. male who presents to transplant cardiology clinic for ongoing management in transplant cardiology.   Born with TAPVR repaired at [facility].  James underwent orthotopic heart transplant on February 3, 2019 due to dilated cardiomyopathy and ventricular tachycardia. Tacrolimus was subsequently switched to cyclosporine due to diabetes, but switched back after an acute rejection episode September 21, 2020.    Admitted September 21, 2020 with a few days of symptoms suggestive of cardiac rejection.  He also had been started on Zinc and cimetidine for treatment of warts previous to this.  September 22, 2020 myocardial biopsy showed severe acute cellular rejection, grade III.  He required intubation and ECMO via right leg cannulation on September 24, 2020 secondary to multi organ failure with low cardiac output.  He was on dialysis for a brief amount of time.  He was extubated September 28, 2020 and decannulated from ECMO September 30, 2020.  He was treated with high-dose steroids and Thymoglobulin.  His cyclosporin was switched to tacrolimus.  Repeat biopsy performed October 6, 2020 showed no evidence of rejection.  Hospitalization was complicated by compartment syndrome in the right leg that required surgical therapy with fasciotomies on October 3rd.  Skin was ultimately closed October 9, 2020.  He also had a thoracotomy wound infection requiring placement of a wound VAC and IV antibiotics.  Patient was discharged home on IV cefepime and micafungin.    Patient was admitted January 4-15, 2021 with several days of right calf pain with swelling and fever that had progressed rapidly over 1 day. Ultrasound and MRI confirmed an abscess.  Interventional  Radiology placed a drain January 6, 2020, draining 150 mL of purulent fluid.  Ultimately, he was placed on Ancef and cultures revealed methicillin sensitive Staph aureus.  He was discharged home on January 15, 2021.    Patient s/p multiple subsequent admissions for heart failure without evidence of rejection.      Interval history:  Admitted 6/14/22 - 6/22/22 for diagnostic catheterization that showed normal hemodynamics but severe distal coronary vascular disease.  Biopsy was negative for rejection.  Patient was started on Milrinone and tolerated this well.  He underwent evaluation for retransplant and was subsequently approved in our conference.  He had some atrial ectopic tachycardia prompting an oral amiodarone load.  His Entresto was held.  Because of a mild bump in his creatinine, his diuretics were held.  His pleural effusion resolved during the hospitalization.    Since discharge, he has done well.  His weight is up a little bit.  He denies orthopnea.  No fever.  No problems with his PICC line site.  No cough or rhinorrhea.  No shortness of breath.  No chest pain or palpitations.    Last week, we recheck blood work.  His creatinine was improved, but his tacro limits level was quite low.  I bumped his dose up from 3 mg to 4 mg twice a day after confirming compliance with his mother.    Transplant history  Transplant Date: 2/3/2019 (Heart)  Underlying cardiac diagnosis: Dilated cardiomyopathy, TAPVR w inferior vertical vein  History of mechanical circulatory support: None prior to transplant but was on ECMO for severe rejection September 2020.  Transplant center: [facility]    Rejection  History of rejection: yes, September 21, 2020 with Grade III cellular rejection. May 19/2021 with mild AMR.   10/24/21- Admitted for HF symptoms. Had been given oral pred by PCP for 3 days prior for cough. Found to have decreased biventricular systolic function. Biopsy was negative, likely due to  pre-treatment of steroids. He received IV pulse steroids and was tapered to oral steroids. Sirolimus started for additional coverage.     Infection  History of infection:  Yes - left thoracotomy wound infection related to ECMO September 2020, pseudomonas.  MSSA from calf wound.    Cardiac allograft vasculopathy: Yes  Severe, diffuse small vessel disease seen on cath 11/20/21 with functional upgrading    Last cardiac catheterization:  February 23, 2021    Baseline Immunosuppression:  Tacrolimus and MMF, and Sirolimus added on 10/24/21 admission    Medication compliance addressed  Missed doses: None  Late doses (>15 minutes): None  Knows medicine names:Patient-- All meds  Knows medication doses:  Yes  Diagnosis of diabetes mellitus post transplant May 2019 - followed by endocrine    The review of systems is as noted above. It is otherwise negative for other symptoms related to the general, neurological, psychiatric, endocrine, gastrointestinal, genitourinary, respiratory, dermatologic, musculoskeletal, hematologic, and immunologic systems.    PAST MEDICAL HISTORY:   Past Medical History:   Diagnosis Date    CHF (congestive heart failure)     Coronary artery disease     Diabetes mellitus     Dilated cardiomyopathy 2019    Encounter for blood transfusion     Organ transplant     TAPVR (total anomalous pulmonary venous return) 2004     FAMILY HISTORY:   Family History   Problem Relation Age of Onset    Heart disease Paternal Grandfather     Melanoma Neg Hx     Psoriasis Neg Hx     Lupus Neg Hx     Eczema Neg Hx      SOCIAL HISTORY:   Social History     Socioeconomic History    Marital status: Single   Tobacco Use    Smoking status: Never Smoker    Smokeless tobacco: Never Used   Substance and Sexual Activity    Alcohol use: Never    Drug use: Never    Sexual activity: Never   Social History Narrative    Lives at home with parents and siblings. Attends [employer] sophomore  "      ALLERGIES:  Review of patient's allergies indicates:   Allergen Reactions    Measles (rubeola) vaccines      No live virus vaccines in transplant recipients    Nsaids (non-steroidal anti-inflammatory drug)      Renal failure with transplant medications    Varicella vaccines      Live virus vaccine    Grapefruit      Interacts with transplant medications       MEDICATIONS:    Current Outpatient Medications:     amiodarone (PACERONE) 200 MG Tab, Take 1 tablet (200 mg total) by mouth once daily., Disp: 30 tablet, Rfl: 11    aspirin 81 MG Chew, Take 1 tablet (81 mg total) by mouth once daily., Disp: , Rfl: 11    blood sugar diagnostic (TRUE METRIX GLUCOSE TEST STRIP) Strp, TEST BLOOD SUGAR UP TO 8 TIMES PER DAY., Disp: 200 each, Rfl: 4    blood-glucose meter,continuous (DEXCOM G6 ) Misc, For use with dexcom continuous glucose monitoring system, Disp: 1 each, Rfl: 1    blood-glucose sensor (DEXCOM G6 SENSOR) Cely, Use for continuous glucose monitoring;change as needed up to 10 day wear., Disp: 3 each, Rfl: 12    blood-glucose transmitter (DEXCOM G6 TRANSMITTER) Cely, Use with dexcom sensor for continuous glucose monitoring; change [facility] up to 90 day use, Disp: 2 Device, Rfl: 4    DULoxetine (CYMBALTA) 60 MG capsule, Take 1 capsule (60 mg total) by mouth once daily., Disp: 30 capsule, Rfl: 11    famotidine (PEPCID) 20 MG tablet, Take 1 tablet (20 mg total) by mouth 2 (two) times daily., Disp: 60 tablet, Rfl: 11    insulin pump cart,automated,BT (OMNIPOD 5 G6 PODS, GEN 5,) Crtg, 1 Device by subcutaneous (via wearable injector) route every other day., Disp: 15 each, Rfl: 2    milrinone lactate (MILRINONE IV), Inject into the vein., Disp: , Rfl:     mycophenolate (CELLCEPT) 500 mg Tab, Take 2 tablets (1,000 mg total) by mouth 2 (two) times daily., Disp: 180 tablet, Rfl: 11    pen needle, diabetic (BD ULTRA-FINE DEACON PEN NEEDLE) 32 gauge x 5/32" Ndle, USE UP TO 8 " "NEEDLES DAILY TO ADMINISTER INSULIN, Disp: 250 each, Rfl: 2    pravastatin (PRAVACHOL) 20 MG tablet, Take 1 tablet (20 mg total) by mouth every morning., Disp: 90 tablet, Rfl: 11    sirolimus (RAPAMUNE) 1 MG Tab, Take 4 tablets (4 mg total) by mouth once daily., Disp: 360 tablet, Rfl: 3    spironolactone (ALDACTONE) 25 MG tablet, Take 1 tablet (25 mg total) by mouth once daily., Disp: 30 tablet, Rfl: 11    tacrolimus (PROGRAF) 1 MG Cap, Take 3 capsules (3 mg total) by mouth every 12 (twelve) hours. (Patient taking differently: Take 4 mg by mouth every 12 (twelve) hours.), Disp: 180 capsule, Rfl: 11    albuterol (PROAIR HFA) 90 mcg/actuation inhaler, Inhale 2 puffs into the lungs every 4 (four) hours as needed for Shortness of Breath. Rescue, Disp: 8 g, Rfl: 3    furosemide (LASIX) 40 MG tablet, Take 1 tablet (40 mg total) by mouth 2 (two) times daily. (Patient not taking: Reported on 7/5/2022), Disp: 60 tablet, Rfl: 11    gabapentin (NEURONTIN) 300 MG capsule, Take 1 capsule (300 mg total) by mouth 3 (three) times daily. (Patient not taking: Reported on 7/5/2022), Disp: 90 capsule, Rfl: 2    sacubitriL-valsartan (ENTRESTO) 24-26 mg per tablet, Take 1 tablet by mouth 2 (two) times daily. (Patient not taking: Reported on 7/5/2022), Disp: 240 tablet, Rfl: 0      PHYSICAL EXAM:   Vitals:    07/05/22 1056 07/05/22 1057   BP: 117/68 (!) 116/58   BP Location: Left arm Right leg   Patient Position: Sitting Lying   BP Method: Medium (Automatic) Medium (Automatic)   Pulse: (!) 119    SpO2: 98%    Weight: 58.7 kg (129 lb 6.6 oz)    Height: 5' 8.5" (1.74 m)    BP (!) 116/58 (BP Location: Right leg, Patient Position: Lying, BP Method: Medium (Automatic))   Pulse (!) 119   Ht 5' 8.5" (1.74 m)   Wt 58.7 kg (129 lb 6.6 oz)   SpO2 98%   BMI 19.39 kg/m²   Wt Readings from Last 3 Encounters:   07/05/22 58.5 kg (128 lb 15.5 oz) (21 %, Z= -0.81)*   07/05/22 58.7 kg (129 lb 6.6 oz) (22 %, Z= -0.78)*   06/19/22 51.8 kg (114 " "lb 3.2 oz) (5 %, Z= -1.69)*     * Growth percentiles are based on CDC (Boys, 2-20 Years) data.     Ht Readings from Last 3 Encounters:   07/05/22 5' 8.5" (1.74 m) (40 %, Z= -0.26)*   06/18/22 5' 7.52" (1.715 m) (28 %, Z= -0.60)*   05/27/22 5' 8.11" (1.73 m) (35 %, Z= -0.38)*     * Growth percentiles are based on CDC (Boys, 2-20 Years) data.     Body mass index is 19.39 kg/m².  18 %ile (Z= -0.90) based on CDC (Boys, 2-20 Years) BMI-for-age based on BMI available as of 7/5/2022.  22 %ile (Z= -0.78) based on [facility] (Boys, 2-20 Years) weight-for-age data using vitals from 7/5/2022.  40 %ile (Z= -0.26) based on [facility] (Boys, 2-20 Years) Stature-for-age data based on Stature recorded on 7/5/2022.      Physical Examination:  Constitutional: Appears well-developed. Non-toxic.   HENT:   Nose: Nose normal.   Mouth/Throat: Mucous membranes are moist. No oral lesions. No thrush. No tonsillar hypertrophy.   Eyes: Conjunctivae and EOM are normal.   Neck: Neck supple.  jugular venous distension noted with prominent jugular venous pulsations.  Cardiovascular:  Tachycardic, regular rhythm, S1 normal and split S2  no murmurs .   prominent S4 gallop.  2+ peripheral pulses.  Pulmonary/Chest: Effort normal and air entry normal bilaterally.  No respiratory distress.   Well healed median sternotomy and chest tube sites.  The left thoracotomy site is well-healed.  Breath sounds are clear throughout.  No wheezes or rales.  Abdominal: Soft. Bowel sounds are normal.  very mild abdominal distention, possible mild ascites. Liver is not enlarged. There is no tenderness.   Neurological: Alert. Exhibits normal muscle tone.   Skin: Skin is warm and dry. Capillary refill takes less than 2 seconds. Turgor is normal. No cyanosis.   Extremities:  Left leg: No significant tenderness, edema, or deformity.  There is no erythema or warmth.  In the right leg incisions are completely healed. Right calf smaller than left. No tenderness or significant erythema. " There is no increased warmth.  Excellent distal pulses are noted.  There is no edema in the feet.  Extensive scarring on the right calf noted.  No evidence of infection.     I personally reviewed and interpreted the following studies and tests:  An EKG in clinic today reveals sinus tachycardia with rate about 120 beats per minute.  Right bundle branch block noted.  No significant change compared to EKG from June 17, 2022 although the precordial lead voltage is may be higher on the current EKG.    Most recent echocardiogram June 14, 2022:  Infradiaphragmatic TAPVR s/p repair with patent vertical vein and chronic dilated cardiomyopathy with severely depressed biventricular systolic function. - s/p orthotopic heart transplant with a biatrial anastomosis and ligation of the vertical vein at the diaphragm (2/3/19). - s/p severe cellular rejection with hemodynamic compromise needing ECMO (9/21-9/30/2020). IVC dilated There are multiple jets of tricuspid valve regurgitation, mild to moderate Moderate left atrial enlargement. Moderate right atrial enlargement. Right ventricle systolic pressure estimate normal. Right ventricle is mildly hypertrophied. Moderately decreased right ventricular systolic function. Septal hypokinesis with fair posterior wall contractility. Overall mild to moderately reduced left ventricular systolic function with an ejection fraction modified biplane of 44%. Abormal global longitudinal strain of -8.5% Moderate right pleural effusion.      CPET March 2022:  VO2 max 15  Increased PVCs during and after study        Lab Results   Component Value Date    WBC 2.61 (L) 07/05/2022    HGB 8.3 (L) 07/05/2022    HCT 27.9 (L) 07/05/2022    MCV 67 (L) 07/05/2022     07/05/2022       CMP  Sodium   Date Value Ref Range Status   07/05/2022 139 136 - 145 mmol/L Final     Potassium   Date Value Ref Range Status   07/05/2022 3.8 3.5 - 5.1 mmol/L Final     Chloride   Date Value Ref Range Status   07/05/2022 108  95 - 110 mmol/L Final     CO2   Date Value Ref Range Status   07/05/2022 22 (L) 23 - 29 mmol/L Final     Glucose   Date Value Ref Range Status   07/05/2022 109 70 - 110 mg/dL Final     BUN   Date Value Ref Range Status   07/05/2022 10 5 - 18 mg/dL Final     Creatinine   Date Value Ref Range Status   07/05/2022 0.8 0.5 - 1.4 mg/dL Final     Calcium   Date Value Ref Range Status   07/05/2022 9.1 8.7 - 10.5 mg/dL Final     Total Protein   Date Value Ref Range Status   07/05/2022 6.7 6.0 - 8.4 g/dL Final     Albumin   Date Value Ref Range Status   07/05/2022 3.6 3.2 - 4.7 g/dL Final     Total Bilirubin   Date Value Ref Range Status   07/05/2022 0.7 0.1 - 1.0 mg/dL Final     Comment:     For infants and newborns, interpretation of results should be based  on gestational age, weight and in agreement with clinical  observations.    Premature Infant recommended reference ranges:  Up to 24 hours.............<8.0 mg/dL  Up to 48 hours............<12.0 mg/dL  3-5 days..................<15.0 mg/dL  6-29 days.................<15.0 mg/dL       Alkaline Phosphatase   Date Value Ref Range Status   07/05/2022 150 59 - 164 U/L Final     AST   Date Value Ref Range Status   07/05/2022 20 10 - 40 U/L Final     ALT   Date Value Ref Range Status   07/05/2022 8 (L) 10 - 44 U/L Final     Anion Gap   Date Value Ref Range Status   07/05/2022 9 8 - 16 mmol/L Final     eGFR if    Date Value Ref Range Status   07/05/2022 SEE COMMENT >60 mL/min/1.73 m^2 Final     eGFR if non    Date Value Ref Range Status   07/05/2022 SEE COMMENT >60 mL/min/1.73 m^2 Final     Comment:     Calculation used to obtain the estimated glomerular filtration  rate (eGFR) is the CKD-EPI equation.   Test not performed.  GFR calculation is only valid for patients   18 and older.       Lab Results   Component Value Date    CHOL 157 06/18/2022    CHOL 148 05/18/2021    CHOL 194 04/21/2020     Lab Results   Component Value Date    HDL 33 (L)  06/18/2022    HDL 46 05/18/2021    HDL 51 04/21/2020     Lab Results   Component Value Date    LDLCALC 92.4 06/18/2022    LDLCALC 78.4 05/18/2021    LDLCALC 111.2 04/21/2020     Lab Results   Component Value Date    TRIG 158 (H) 06/18/2022    TRIG 118 05/18/2021    TRIG 159 (H) 04/21/2020     Lab Results   Component Value Date    CHOLHDL 21.0 06/18/2022    CHOLHDL 31.1 05/18/2021    CHOLHDL 26.3 04/21/2020     Tacrolimus Lvl   Date Value Ref Range Status   07/01/2022 <2.0 (L) 5.0 - 15.0 ng/mL Final     Comment:     Testing performed by a chemiluminescent microparticle   immunoassay on the [email or web address] i System.       MPA   Date Value Ref Range Status   06/24/2022 2.7 1.0 - 3.5 mcg/mL Final     Magnesium   Date Value Ref Range Status   07/05/2022 1.8 1.6 - 2.6 mg/dL Final     EBV DNA, PCR   Date Value Ref Range Status   06/13/2022 Undetected Undetected IU/mL Final     Comment:     Result in log IU/mL is Undetected.    -------------------ADDITIONAL INFORMATION-------------------  The quantification range of this assay is 35 to 100,000,000   IU/mL (1.54 log to 8.00 log IU/mL). Testing was performed   using the toney EBV test ([employer]   with the toney 6800 System.    Test Performed by:  [facility]  [address] [place] [address]  : Santos Mcfadden M.D. Ph.D.; CLIA# [id number]       Cytomegalovirus DNA   Date Value Ref Range Status   06/17/2022 Not Detected Not Detected Final     Class I Antibody Comments - Luminex   Date Value Ref Range Status   06/17/2022 [id number])  Final     Comment:     These tests are not cleared or approved by the U.S. FDA, but such   approval is not required since this laboratory is certified by CLIA   (#[id number]) and the American Society for Histocompatibility and   Immunogenetics (34-0-QH-02-01) to perform high complexity testing.    [facility]    Laboratory is under the direction of SHERI Jones MD, DILLON.   Details of test procedures may be obtained by calling the Laboratory   at  [phone].  Test performed using immunofluorescent detection - Luminex. Class I   and class II beads have been EDTA treated. This test was developed,   and its performance characteristics determined by the [facility].       Class II Antibody Comments - Luminex   Date Value Ref Range Status   06/17/2022 WEAK [id number]), DRB5*01:01(1609)   Final     Comment:     These tests are not cleared or approved by the U.S. FDA, but such   approval is not required since this laboratory is certified by CLIA   (#[id number]) and the American Society for Histocompatibility and   Immunogenetics (39-6-PD-02-01) to perform high complexity testing.    [facility]   Laboratory is under the direction of SHERI Jones MD, DILLON.   Details of test procedures may be obtained by calling the Laboratory   at  [phone].  These tests are not cleared or approved by the U.S. FDA, but such   approval is not required since this laboratory is certified by CLIA   (#[id number]) and the American Society for Histocompatibility and   Immunogenetics (34-1-KH-02-01) to perform high complexity testing.    [facility]   Laboratory is under the direction of SHERI Jones MD, DILLON.   Details of test procedures may be obtained by calling the Laboratory   at  [phone].  Test performed using immunofluorescent detection - Luminex. Class I   and class II beads have been EDTA treated. This test was developed,   and its performance characteristics determined by the [facility].       No results found for: SIROLIMUS        Assessment and Plan:   James Helm is a 18 y.o. male with:  1.  History  of TAPVR s/p repair as a baby  2.  Orthotopic heart transplant on February 3, 2019 due to dilated cardiomyopathy  3.  Post transplant diabetes mellitus  4.  Acute systolic heart failure, severe cell mediated rejection, grade 3R (9/22/20) with hemodynamic compromise, repeat biopsy negative (10/6/20).   - V-A ECMO 9/23 (right foot perfusion catheter)  - LV vent 9/24, removed 9/27  - s/p ECMO decannulation (9/30)  5. AMR on cath 5/19/21 on steroid course. Repeat biopsy on 7/1/21, negative for rejection.  Biopsy negative rejection 10/24/21- treated with steroids.  Repeat Biopsy 2/23/22 negative for rejection.  6. Severe small vessel coronary disease noted on cath 11/30/21.  - chronic systolic and diastolic heart failure  - weight up several kg since discharge, clinical evidence of mild ascites with patient currently off diuretics  7. BULL with increased BUN and creat that improved on ECMO, recurrent post ECMO s/p CRRT, resolved   - currently with good renal function  8. History of atrial tachycardia, treated in hospital June 2022 with amiodarone load.  Now on maintenance dose.  9. Compartment syndrome of right lower leg- s/p fasciotomy 10/3, closure 10/9  - Abscess in right calf prompting hospitalization January 4th through January 15, 2021.  Drain placed January 6, 2021 through January 22, 2021.  On IV antibiotics until January 29, 2021.    - Incision and Drainage of R calf on 2/2/21, wound vac application with subsequent changes. Was on IV antibiotics until 3/16/21.   - Persistent right foot pain  10. S/p bedside wound debridement and wound vac placement to left thoracotomy site (10/11/20) - pseudomonas.  Resolved.   11. Peripheral neuropathy per PMR (secondary to tacrolimus)  12. Moderate to severely reduced VO2 max  13. Abnormal spirometry     He is now we listed as a status 1 B due to his severe distal coronary disease and repeated heart failure admissions.     Plan:    Immunosuppression:  - Continue Sirolimus.   Follow-up level from today.  - Tacrolimus.   Follow-up level from today.  Dose was increased on July 1, 2022.   - [id number] mg PO BID, goal 2-4.   - DSA negative May 14, 2022    Combined systolic and diastolic heart failure:   - now on continuous Milrinone.  Continue off Entresto.   - restart Lasix.  Will start with 40 mg daily.   - planning repeat cardiac catheterization June 14, 2022   - On Aldactone    Ectopic atrial tachycardia   - continue low-dose amiodarone     Graft surveillance/follow up:    Biopsy negative June 14, 2022. D    CAV PPX  Pravastatin 20mg daily  ASA daily     FENGI:  Mg Goal >1.2, off magnesium  He has reflux that seems improved.     ENDO:  Close follow-up with endocrinology. Continue insulin.    Neuro/psych:      - continue current medications  - Adjustment disorder with depressed mood, SSRI started for chronic pain  - Dr. Ayala following -  he is required to meet with Dr Ayala to be considered for re-transplant.     Pulm:  - Abnormal spirometry    Musc:      - PM&R following    Heme/ID:  - pretransplant CMV and EBV positive  - CMV and EBV PCR negative June 13, 2022. Patient CMV IgG positive, EBV negative.    - Bactrim held - pentamadine given after 1st transplant   - Left thoracotomy incision with drainage in the past- pseudomonas - treated with cefipime.  Resolved.    Derm:   Multiple warts - followed by Dermatology.     - Yearly derm done, multiple warts removed (11/9/21)  - Apply sunscreen to exposed areas every day     Genetics:  Cardiomyopathy panel with variant of unknown significance.  Family aware that the recommendation is that both parents and the kids echos.     Activity:  Scuba Diving restrictions due to denervated heart and pressure changes.       Dentist:  He saw his dentist on May 19th 2021. Due for a dental visit.       Sincerely,        Carlos Christianson MD  Pediatric Cardiology  Adult Congenital Heart Disease  Pediatric Heart Failure and Transplantation  [facility]  [address]  [address]  [phone]                 ,

## 2022-08-23 NOTE — PHYSICAL THERAPY INITIAL EVALUATION ADULT - ADDITIONAL COMMENTS
Pt. resides with spouse in home with stairs and railing Path Notes (To The Dermatopathologist): Please check margins.

## 2023-03-23 NOTE — PROGRESS NOTE ADULT - PROVIDER SPECIALTY LIST ADULT
Urgency: ASAP  Referral Type: INTERNAL  Location: [place]  Procedure: Colonoscopy  Diagnosis: Screening for colorectal cancer [Z12.11, Z12.12]   External hemorrhoids [K64.4]   Abdominal pain, bilateral lower quadrant [R10.31, R10.32]   Rectal bleeding [K62.5]   History of colon polyps [Z86.010]  Referring Provider: Yanira Davis PA-C   Referral To: UNSPECIFIED  Referral Notes: She has abdominal obesity now. Pt has external hemorrhoids; she has bleeding w/ BMs a few times/week in the last 9 months; 5x/week in the am in the last 6 months. She is having left SI joint pain, mild. Pt has h/o 11 colon polyps removed at 40 yo; she was told to have c-scope done q year since then, which was never done. She is having abdominal pain, too. She agrees to FBW for 2023; she was told she had \"cloudy liver\" in the past.  Previous Procedure: NO       Left message for patient to call back.  Letter sent asking to return call to schedule.    Attention [employer]: Please message [facility] [phone] [place] [facility] PROCEDURE SCHEDULING for return phone calls.   Pulmonology

## 2023-08-17 NOTE — OCCUPATIONAL THERAPY INITIAL EVALUATION ADULT - MANUAL MUSCLE TESTING RESULTS, REHAB EVAL
You can access the FollowMyHealth Patient Portal offered by [facility] [place] by registering at the following website: [email or web address]. By joining [employer]’s FollowMyHealth portal, you will also be able to view your health information using other applications (apps) compatible with our system.
Unable to formally assess; patient unable to follow commands

## 2023-11-10 NOTE — PROGRESS NOTE ADULT - PROBLEM SELECTOR PROBLEM 1
ARTEMIO (acute kidney injury)
Acute hypoxemic respiratory failure due to COVID-19
ARTEMIO (acute kidney injury)
done

## 2023-11-13 NOTE — PHYSICAL THERAPY INITIAL EVALUATION ADULT - CRITERIA FOR SKILLED THERAPEUTIC INTERVENTIONS
impairments found/functional limitations in following categories/risk reduction/prevention/rehab potential/therapy frequency/predicted duration of therapy intervention/anticipated discharge recommendation
impairments found
No

## 2024-06-10 NOTE — PROGRESS NOTE ADULT - SUBJECTIVE AND OBJECTIVE BOX
Sinus Infection, Adult  A sinus infection, also called sinusitis, is inflammation of your sinuses. Sinuses are hollow spaces in the bones around your face. Your sinuses are located:  Around your eyes.  In the middle of your forehead.  Behind your nose.  In your cheekbones.  Mucus normally drains out of your sinuses. When your nasal tissues become inflamed or swollen, mucus can become trapped or blocked. This allows bacteria, viruses, and fungi to grow, which leads to infection. Most infections of the sinuses are caused by a virus.  A sinus infection can develop quickly. It can last for up to 4 weeks (acute) or for more than 12 weeks (chronic). A sinus infection often develops after a cold.  What are the causes?  This condition is caused by anything that creates swelling in the sinuses or stops mucus from draining. This includes:  Allergies.  Asthma.  Infection from bacteria or viruses.  Deformities or blockages in your nose or sinuses.  Abnormal growths in the nose (nasal polyps).  Pollutants, such as chemicals or irritants in the air.  Infection from fungi. This is rare.  What increases the risk?  You are more likely to develop this condition if you:  Have a weak body defense system (immune system).  Do a lot of swimming or diving.  Overuse nasal sprays.  Smoke.  What are the signs or symptoms?  The main symptoms of this condition are pain and a feeling of pressure around the affected sinuses. Other symptoms include:  Stuffy nose or congestion that makes it difficult to breathe through your nose.  Thick yellow or greenish drainage from your nose.  Tenderness, swelling, and warmth over the affected sinuses.  A cough that may get worse at night.  Decreased sense of smell and taste.  Extra mucus that collects in the throat or the back of the nose (postnasal drip) causing a sore throat or bad breath.  Tiredness (fatigue).  Fever.  How is this diagnosed?  This condition is diagnosed based on:  Your symptoms.  Your  [facility] Division of Kidney Diseases & Hypertension  FOLLOW UP NOTE  [phone]--------------------------------------------------------------------------------  Chief Complaint:Infection due to severe acute respiratory syndrome coronavirus 2 (SARS-CoV-2)        24 hour events/subjective: No acute events overnight. Patient tolerating CRRT without issues. Labs, vitals, medications and imaging reviewed. 100% FiO2/PEEP 18.        PAST HISTORY  --------------------------------------------------------------------------------  No significant changes to PMH, PSH, FHx, SHx, unless otherwise noted    ALLERGIES & MEDICATIONS  --------------------------------------------------------------------------------  Allergies    Kiwi (Unknown)  latex (Anaphylaxis)  latex (Unknown)  penicillin (Other)  penicillin (Unknown)  perfume  hives (Other)  potassium acetate (Other)  soap additives hives (Other)    Intolerances      Standing Inpatient Medications  ALBUTerol    90 MICROgram(s) HFA Inhaler 4 Puff(s) Inhalation every 6 hours  cefTRIAXone   IVPB 1000 milliGRAM(s) IV Intermittent every 24 hours  chlorhexidine 0.12% Liquid 15 milliLiter(s) Oral Mucosa every 12 hours  chlorhexidine 4% Liquid 1 Application(s) Topical <User Schedule>  chlorhexidine 4% Liquid 1 Application(s) Topical <User Schedule>  cisatracurium Infusion 3 MICROgram(s)/kG/Min IV Continuous <Continuous>  CRRT Treatment    <Continuous>  dexAMETHasone  Injectable 6 milliGRAM(s) IV Push daily  heparin  Infusion 1500 Unit(s)/Hr IV Continuous <Continuous>  insulin regular Infusion 5 Unit(s)/Hr IV Continuous <Continuous>  ipratropium 17 MICROgram(s) HFA Inhaler 2 Puff(s) Inhalation every 6 hours  metoclopramide Injectable 10 milliGRAM(s) IV Push every 6 hours  midazolam Infusion 0.02 mG/kG/Hr IV Continuous <Continuous>  midodrine 10 milliGRAM(s) Oral every 8 hours  norepinephrine Infusion 0.05 MICROgram(s)/kG/Min IV Continuous <Continuous>  pantoprazole  Injectable 40 milliGRAM(s) IV Push daily  petrolatum Ophthalmic Ointment 1 Application(s) Both EYES two times a day  PrismaSATE Dialysate BGK 4 / 2.5 5000 milliLiter(s) CRRT <Continuous>  PrismaSOL Filtration BGK 4 / 2.5 5000 milliLiter(s) CRRT <Continuous>  PrismaSOL Filtration BGK 4 / 2.5 5000 milliLiter(s) CRRT <Continuous>  propofol Infusion 50 MICROgram(s)/kG/Min IV Continuous <Continuous>  vancomycin  IVPB 2000 milliGRAM(s) IV Intermittent every 12 hours  vancomycin  IVPB        PRN Inpatient Medications  acetaminophen    Suspension .. 650 milliGRAM(s) Oral every 6 hours PRN  sodium chloride 0.9% lock flush 10 milliLiter(s) IV Push every 1 hour PRN      REVIEW OF SYSTEMS: Unable to obtain.      VITALS/PHYSICAL EXAM  --------------------------------------------------------------------------------  T(C): 37 (11-28-20 @ 07:00), Max: 37.6 (11-27-20 @ 16:00)  HR: 89 (11-28-20 @ 07:00) (86 - 102)  BP: --  RR: 30 (11-28-20 @ 07:00) (11 - 31)  SpO2: 95% (11-28-20 @ 07:00) (86% - 96%)  Wt(kg): --        11-27-20 @ 07:01  -  11-28-20 @ 07:00  --------------------------------------------------------  IN: 3530.4 mL / OUT: 3182 mL / NET: 348.4 mL      Physical Exam:  	Gen: NAD  	HEENT: ETT  	Pulm: CTA B/L, no crackles, on Fio2 100%, PEEP 18   	CV: S1S2  	Abd: Soft, +BS               : negro catheter in situe  	Ext: No LE edema B/L  	Neuro: sedated   	Skin: Warm and dry  	Vascular access: central line               Psyc: unable to assess    LABS/STUDIES  --------------------------------------------------------------------------------              11.0   24.33 >-----------<  156      [11-28-20 @ 00:46]              36.9     137  |  101  |  40  ----------------------------<  155      [11-28-20 @ 05:29]  4.9   |  22  |  1.86        Ca     8.3     [11-28-20 @ 05:29]      Mg     2.6     [11-28-20 @ 05:29]      Phos  4.2     [11-28-20 @ 05:29]      PT/INR: PT 12.9 , INR 1.08       [11-28-20 @ 00:46]  PTT: 46.8       [11-28-20 @ 00:46]          [11-26-20 @ 23:52]    Creatinine Trend:  SCr 1.86 [11-28 @ 05:29]  SCr 2.35 [11-28 @ 00:46]  SCr 3.00 [11-26 @ 23:52]  SCr 2.57 [11-26 @ 00:55]  SCr 2.18 [11-25 @ 00:46]    Urinalysis - [11-26-20 @ 23:57]      Color Yellow / Appearance Slightly Turbid / SG 1.021 / pH 5.5      Gluc Negative / Ketone Negative  / Bili Negative / Urobili Negative       Blood Negative / Protein 30 mg/dL / Leuk Est Moderate / Nitrite Negative      RBC 3 / WBC 8 / Hyaline 4 / Gran  / Sq Epi  / Non Sq Epi 2 / Bacteria Occasional    Urine Protein 40      [11-24-20 @ 13:46]  Urine Sodium <35      [11-26-20 @ 23:56]  Urine Urea Nitrogen 449      [11-24-20 @ 13:46]  Urine Potassium 21      [11-24-20 @ 10:18]  Urine Phosphorus 68.0      [11-24-20 @ 13:46]  Urine Osmolality 353      [11-26-20 @ 23:57]    Ferritin 709      [11-27-20 @ 06:15]  Lipid: chol --, , HDL --, LDL --      [11-25-20 @ 00:46]       medical history.  A physical exam.  Tests to find out if your condition is acute or chronic. This may include:  Checking your nose for nasal polyps.  Viewing your sinuses using a device that has a light (endoscope).  Testing for allergies or bacteria.  Imaging tests, such as an MRI or CT scan.  In rare cases, a bone biopsy may be done to rule out more serious types of fungal sinus disease.  How is this treated?  Treatment for a sinus infection depends on the cause and whether your condition is chronic or acute.  If caused by a virus, your symptoms should go away on their own within 10 days. You may be given medicines to relieve symptoms. They include:  Medicines that shrink swollen nasal passages (decongestants).  A spray that eases inflammation of the nostrils (topical intranasal corticosteroids).  Rinses that help get rid of thick mucus in your nose (nasal saline washes).  Medicines that treat allergies (antihistamines).  Over-the-counter pain relievers.  If caused by bacteria, your health care provider may recommend waiting to see if your symptoms improve. Most bacterial infections will get better without antibiotic medicine. You may be given antibiotics if you have:  A severe infection.  A weak immune system.  If caused by narrow nasal passages or nasal polyps, surgery may be needed.  Follow these instructions at home:  Medicines  Take, use, or apply over-the-counter and prescription medicines only as told by your health care provider. These may include nasal sprays.  If you were prescribed an antibiotic medicine, take it as told by your health care provider. Do not stop taking the antibiotic even if you start to feel better.  Hydrate and humidify    Drink enough fluid to keep your urine pale yellow. Staying hydrated will help to thin your mucus.  Use a cool mist humidifier to keep the humidity level in your home above 50%.  Inhale steam for 10-15 minutes, 3-4 times a day, or as told by your health care  provider. You can do this in the bathroom while a hot shower is running.  Limit your exposure to cool or dry air.  Rest  Rest as much as possible.  Sleep with your head raised (elevated).  Make sure you get enough sleep each night.  General instructions    Apply a warm, moist washcloth to your face 3-4 times a day or as told by your health care provider. This will help with discomfort.  Use nasal saline washes as often as told by your health care provider.  Wash your hands often with soap and water to reduce your exposure to germs. If soap and water are not available, use hand .  Do not smoke. Avoid being around people who are smoking (secondhand smoke).  Keep all follow-up visits. This is important.  Contact a health care provider if:  You have a fever.  Your symptoms get worse.  Your symptoms do not improve within 10 days.  Get help right away if:  You have a severe headache.  You have persistent vomiting.  You have severe pain or swelling around your face or eyes.  You have vision problems.  You develop confusion.  Your neck is stiff.  You have trouble breathing.  These symptoms may be an emergency. Get help right away. Call 911.  Do not wait to see if the symptoms will go away.  Do not drive yourself to the hospital.  Summary  A sinus infection is soreness and inflammation of your sinuses. Sinuses are hollow spaces in the bones around your face.  This condition is caused by nasal tissues that become inflamed or swollen. The swelling traps or blocks the flow of mucus. This allows bacteria, viruses, and fungi to grow, which leads to infection.  If you were prescribed an antibiotic medicine, take it as told by your health care provider. Do not stop taking the antibiotic even if you start to feel better.  Keep all follow-up visits. This is important.  This information is not intended to replace advice given to you by your health care provider. Make sure you discuss any questions you have with your health  care provider.  Document Revised: 11/22/2022 Document Reviewed: 11/22/2022  Elsevier Patient Education © 2024 [employer]

## 2024-08-16 NOTE — PROGRESS NOTE ADULT - ATTENDING COMMENTS
Htn, DVT on xarelto, h/o as above here with acute hypoxemic respiratory failure due to COVID19,  then with progressive acute hypoxemic and hypercapnic respiratory failure due to COVID 19 and ARDS requiring intubation, c/b MSSA bacteremia, lung abscess, now with ARTEMIO requiring CVVHD->intermittent HD.  Decrease fentanyl as tolerated, try to wake her up.  CT head nondiagnostic.  Possible she just needs more time off sedation. done

## 2024-10-07 ENCOUNTER — APPOINTMENT (OUTPATIENT)
Dept: UROLOGY | Facility: CLINIC | Age: 64
End: 2024-10-07

## 2024-11-06 ENCOUNTER — APPOINTMENT (OUTPATIENT)
Dept: UROLOGY | Facility: CLINIC | Age: 64
End: 2024-11-06
Payer: COMMERCIAL

## 2024-11-06 ENCOUNTER — LABORATORY RESULT (OUTPATIENT)
Age: 64
End: 2024-11-06

## 2024-11-06 ENCOUNTER — NON-APPOINTMENT (OUTPATIENT)
Age: 64
End: 2024-11-06

## 2024-11-06 VITALS
OXYGEN SATURATION: 99 % | RESPIRATION RATE: 16 BRPM | HEART RATE: 85 BPM | SYSTOLIC BLOOD PRESSURE: 143 MMHG | DIASTOLIC BLOOD PRESSURE: 67 MMHG

## 2024-11-06 DIAGNOSIS — U07.1 COVID-19: ICD-10-CM

## 2024-11-06 DIAGNOSIS — N39.41 URGE INCONTINENCE: ICD-10-CM

## 2024-11-06 DIAGNOSIS — Z87.442 PERSONAL HISTORY OF URINARY CALCULI: ICD-10-CM

## 2024-11-06 DIAGNOSIS — R31.0 GROSS HEMATURIA: ICD-10-CM

## 2024-11-06 PROCEDURE — 99204 OFFICE O/P NEW MOD 45 MIN: CPT

## 2024-11-06 RX ORDER — RIVAROXABAN 20 MG/1
20 TABLET, FILM COATED ORAL
Refills: 0 | Status: ACTIVE | COMMUNITY

## 2024-11-06 RX ORDER — OXYBUTYNIN CHLORIDE 15 MG/1
15 TABLET, EXTENDED RELEASE ORAL
Refills: 0 | Status: ACTIVE | COMMUNITY

## 2024-11-07 LAB
APPEARANCE: ABNORMAL
BILIRUBIN URINE: ABNORMAL
BLOOD URINE: ABNORMAL
COLOR: ABNORMAL
GLUCOSE QUALITATIVE U: NEGATIVE MG/DL
KETONES URINE: NEGATIVE MG/DL
LEUKOCYTE ESTERASE URINE: ABNORMAL
NITRITE URINE: NEGATIVE
PH URINE: 5.5
PROTEIN URINE: 300 MG/DL
SPECIFIC GRAVITY URINE: 1.02
URINE CYTOLOGY: NORMAL
UROBILINOGEN URINE: 0.2 MG/DL

## 2024-11-10 PROBLEM — N39.41 URGE INCONTINENCE: Status: ACTIVE | Noted: 2024-11-10

## 2024-11-10 PROBLEM — Z87.442 HISTORY OF NEPHROLITHIASIS: Status: ACTIVE | Noted: 2024-11-10

## 2024-11-12 ENCOUNTER — APPOINTMENT (OUTPATIENT)
Dept: CT IMAGING | Facility: IMAGING CENTER | Age: 64
End: 2024-11-12

## 2024-11-26 ENCOUNTER — APPOINTMENT (OUTPATIENT)
Dept: CT IMAGING | Facility: CLINIC | Age: 64
End: 2024-11-26

## 2024-11-27 ENCOUNTER — APPOINTMENT (OUTPATIENT)
Dept: UROLOGY | Facility: CLINIC | Age: 64
End: 2024-11-27

## 2024-12-18 ENCOUNTER — APPOINTMENT (OUTPATIENT)
Dept: UROLOGY | Facility: CLINIC | Age: 64
End: 2024-12-18

## 2024-12-23 ENCOUNTER — APPOINTMENT (OUTPATIENT)
Dept: CT IMAGING | Facility: CLINIC | Age: 64
End: 2024-12-23

## 2025-01-27 ENCOUNTER — OUTPATIENT (OUTPATIENT)
Dept: OUTPATIENT SERVICES | Facility: HOSPITAL | Age: 65
LOS: 1 days | End: 2025-01-27
Payer: COMMERCIAL

## 2025-01-27 ENCOUNTER — APPOINTMENT (OUTPATIENT)
Dept: UROLOGY | Facility: CLINIC | Age: 65
End: 2025-01-27
Payer: COMMERCIAL

## 2025-01-27 VITALS — OXYGEN SATURATION: 94 % | HEART RATE: 72 BPM | DIASTOLIC BLOOD PRESSURE: 76 MMHG | SYSTOLIC BLOOD PRESSURE: 127 MMHG

## 2025-01-27 DIAGNOSIS — R39.9 UNSPECIFIED SYMPTOMS AND SIGNS INVOLVING THE GENITOURINARY SYSTEM: ICD-10-CM

## 2025-01-27 DIAGNOSIS — N30.00 ACUTE CYSTITIS W/OUT HEMATURIA: ICD-10-CM

## 2025-01-27 DIAGNOSIS — R31.0 GROSS HEMATURIA: ICD-10-CM

## 2025-01-27 DIAGNOSIS — N39.41 URGE INCONTINENCE: ICD-10-CM

## 2025-01-27 DIAGNOSIS — R35.0 FREQUENCY OF MICTURITION: ICD-10-CM

## 2025-01-27 DIAGNOSIS — Z98.89 OTHER SPECIFIED POSTPROCEDURAL STATES: Chronic | ICD-10-CM

## 2025-01-27 PROCEDURE — 99213 OFFICE O/P EST LOW 20 MIN: CPT | Mod: 25

## 2025-01-27 PROCEDURE — 52000 CYSTOURETHROSCOPY: CPT

## 2025-01-27 RX ORDER — SULFAMETHOXAZOLE AND TRIMETHOPRIM 800; 160 MG/1; MG/1
800-160 TABLET ORAL TWICE DAILY
Qty: 2 | Refills: 0 | Status: ACTIVE | OUTPATIENT
Start: 2025-01-27

## 2025-01-27 RX ORDER — SULFAMETHOXAZOLE AND TRIMETHOPRIM 800; 160 MG/1; MG/1
800-160 TABLET ORAL TWICE DAILY
Qty: 14 | Refills: 0 | Status: ACTIVE | COMMUNITY
Start: 2025-01-27 | End: 1900-01-01

## 2025-01-29 ENCOUNTER — APPOINTMENT (OUTPATIENT)
Dept: CT IMAGING | Facility: CLINIC | Age: 65
End: 2025-01-29
Payer: COMMERCIAL

## 2025-01-29 PROCEDURE — 74178 CT ABD&PLV WO CNTR FLWD CNTR: CPT

## 2025-01-30 LAB — BACTERIA UR CULT: ABNORMAL

## 2025-02-02 LAB
APPEARANCE: CLEAR
BACTERIA: NEGATIVE /HPF
BILIRUBIN URINE: NEGATIVE
BLOOD URINE: ABNORMAL
CAST: 0 /LPF
COLOR: YELLOW
EPITHELIAL CELLS: 2 /HPF
GLUCOSE QUALITATIVE U: NEGATIVE MG/DL
KETONES URINE: NEGATIVE MG/DL
LEUKOCYTE ESTERASE URINE: ABNORMAL
MICROSCOPIC-UA: NORMAL
NITRITE URINE: NEGATIVE
PH URINE: 7
PROTEIN URINE: NEGATIVE MG/DL
RED BLOOD CELLS URINE: 1 /HPF
REVIEW: NORMAL
SPECIFIC GRAVITY URINE: 1
URINE CYTOLOGY: NORMAL
UROBILINOGEN URINE: 0.2 MG/DL
WHITE BLOOD CELLS URINE: 47 /HPF

## 2025-02-03 DIAGNOSIS — N30.00 ACUTE CYSTITIS WITHOUT HEMATURIA: ICD-10-CM

## 2025-02-06 DIAGNOSIS — N28.9 DISORDER OF KIDNEY AND URETER, UNSPECIFIED: ICD-10-CM

## 2025-04-01 NOTE — PHARMACOTHERAPY INTERVENTION NOTE - OUTCOME
Test Results    Contacts       Contact Date/Time Type Contact Phone/Fax    03/27/2025 02:59 PM CDT Phone (Incoming) EILEEN [phone]     [facility]            Who ordered the test:  [facility]    Type of test: Lab    Date of test:  03/25/25    Where was the test performed:  [facility]    What are your questions/concerns?:  [facility] is asking for these results    [facility] or would you prefer to receive a phone call?:     
Attempted to call [facility]. No answer.  Delphine Gonzalez LPN    
Called [facility] and obtained fax number. Faxed labs to number provided. [phone] Jeanie Cherry.  Delphine Gonzalez LPN    
Labs not resulted yet.  Delphine Gonzalez LPN    
accepted

## 2025-04-02 ENCOUNTER — NON-APPOINTMENT (OUTPATIENT)
Age: 65
End: 2025-04-02

## 2025-04-03 ENCOUNTER — APPOINTMENT (OUTPATIENT)
Dept: INFECTIOUS DISEASE | Facility: CLINIC | Age: 65
End: 2025-04-03
Payer: COMMERCIAL

## 2025-04-03 VITALS
OXYGEN SATURATION: 92 % | SYSTOLIC BLOOD PRESSURE: 114 MMHG | TEMPERATURE: 98.4 F | HEIGHT: 59 IN | HEART RATE: 61 BPM | WEIGHT: 200 LBS | BODY MASS INDEX: 40.32 KG/M2 | DIASTOLIC BLOOD PRESSURE: 71 MMHG

## 2025-04-03 PROCEDURE — 99204 OFFICE O/P NEW MOD 45 MIN: CPT

## 2025-04-23 ENCOUNTER — APPOINTMENT (OUTPATIENT)
Dept: UROLOGY | Facility: CLINIC | Age: 65
End: 2025-04-23

## 2025-07-20 NOTE — PROGRESS NOTE ADULT - SUBJECTIVE AND OBJECTIVE BOX
Follow Up:      [place] History/ROS:Patient is a 60y old  Female who presents with a chief complaint of Transfer from [facility] (22 Dec 2020 08:13)     s/p IR gastrostomy tube placement 1/11.   febrile 1/12.  ceftazidime, flagyl, vanco started.  blood cultures drawn.      OR for trach change with CT surgery 12/31.           Allergies    Kiwi (Unknown)  latex (Anaphylaxis)  latex (Unknown)  penicillin (Other)  penicillin (Unknown)  perfume  hives (Other)  potassium acetate (Other)  soap additives hives (Other)    Intolerances        ANTIMICROBIALS: cefTAZidime  IVPB 2 every 8 hours  metroNIDAZOLE  IVPB    metroNIDAZOLE  IVPB 500 every 8 hours      MEDICATIONS  (STANDING):  acetaminophen    Suspension .. 650 every 6 hours PRN  amLODIPine   Tablet 5 daily  enoxaparin Injectable 100 every 12 hours  insulin lispro (ADMELOG) corrective regimen sliding scale  every 6 hours  metoprolol tartrate 50 two times a day  metoprolol tartrate Injectable 5 every 6 hours  pantoprazole  Injectable 40 daily  psyllium Powder 1 two times a day    Vital Signs Last 24 Hrs  T(F): 98.6 (01-13-21 @ 13:00), Max: 99.2 (01-13-21 @ 02:06)  HR: 93 (01-13-21 @ 16:34)  BP: 116/51 (01-13-21 @ 16:34)  RR: 18 (01-13-21 @ 16:34)  SpO2: 100% (01-13-21 @ 16:34) (100% - 100%)    PHYSICAL EXAM:  General: [x ] trach, awake, nods to questions  ENT:  eschar chin, trach,  Cardiovascular:   distant  Respiratory:  clear ant  GI:  [x ] soft, scars, hernia, gastrostomy tube with bilious fluid, rectal tube  :  [x ] negro   Musculoskeletal: weak  Neurologic:  unable   Skin:   no rash  Psychiatric:  unable                            8.2    10.13 )-----------( 396      ( 13 Jan 2021 05:51 )             28.9 01-13    141  |  102  |  8   ----------------------------<  98  3.4   |  30  |  0.33  Ca    8.3      13 Jan 2021 05:51Phos  3.2     01-13Mg     1.8     01-13          MICROBIOLOGY:    1/12 blood cultures x 2 testing    cuCulture - Sputum . (01.09.21 @ 18:30)   - Amikacin: S <=16   - Aztreonam: I 16   - Cefepime: S 8   - Ceftazidime: S 4   - Ceftazidime: S 4   - Ciprofloxacin: S 0.5   - Gentamicin: S <=2   - Imipenem: R >8   - Levofloxacin: S 2   - Levofloxacin: S <=0.5   - Meropenem: R >8   - Piperacillin/Tazobactam: S 16   - Tobramycin: S <=2   - Trimethoprim/Sulfamethoxazole: S <=0.5/9.5   Gram Stain:   Few polymorphonuclear leukocytes per low power field   No Squamous epithelial cells per low power field   Numerous Gram Negative Rods seen per oil power field   Specimen Source: .Sputum Sputum   Culture Results:   Numerous Pseudomonas aeruginosa (Carbapenem Resistant)   Numerous Stenotrophomonas maltophilia   Normal Respiratory Jocelynn absent   Organism Identification: Pseudomonas aeruginosa (Carbapenem Resistant)   Stenotrophomonas maltophilia     RADIOLOGY:    rd< from: Xray Chest 1 View-PORTABLE IMMEDIATE (Xray Chest 1 View-PORTABLE IMMEDIATE .) (12.19.20 @ 20:25) >    rd< from: Xray Chest 1 View- PORTABLE-Urgent (Xray Chest 1 View- PORTABLE-Urgent .) (01.02.21 @ 22:42) >    EXAM:  XR CHEST PORTABLE URGENT 1V        PROCEDURE DATE:  Jan 2 2021         INTERPRETATION:  Chest one view    HISTORY: Feeding tube advancement    COMPARISON STUDY: Earlier the same day    Frontal expiratory view of the chest shows the heart dax similar in size. Tracheostomy tube is again noted. Feeding tube is been advanced into the stomach.    The lungs show similar patchy infiltrates, right larger than left. and there is no evidence of pneumothorax nor pleural effusion.    IMPRESSION:  Feeding tube to stomach.    Thank you for the courtesy of this referral.    < end of copied text >       Thank you for coming to our Emergency Department today. It is important to remember that some problems are difficult to diagnose and may not be found during your Emergency Department visit. Be sure to follow up with your primary care doctor and review all labs/imaging/tests that were performed during this visit with them. Some labs/tests may be outside of the normal range and require non-emergent follow-up and further investigation to help diagnose/exclude/prevent complications or other medical conditions.    If you do not have a primary care doctor, you may contact the one listed on your discharge paperwork or you may also call the [facility] Appointment Desk at [phone] to schedule an appointment and establish care with one. It is important to your health that you have a primary care doctor.    Please take all medications as directed. All medications may potentially have side-effects and it is impossible to predict which medications may give you side-effects or what side-effects (if any) they will give you.. If you feel that you are having a negative effect or side-effect of any medication you should immediately stop taking them and seek medical attention. If you feel that you are having a life-threatening reaction call 911.    Return to the ER with any questions/concerns, new/concerning symptoms, worsening or failure to improve.     Do not drive, swim, climb to height, take a bath or make any important decisions for 24 hours if you have received any pain medications, sedatives or mood altering drugs during your ER visit.     gradual onset